# Patient Record
Sex: FEMALE | Race: WHITE | Employment: OTHER | URBAN - METROPOLITAN AREA
[De-identification: names, ages, dates, MRNs, and addresses within clinical notes are randomized per-mention and may not be internally consistent; named-entity substitution may affect disease eponyms.]

---

## 2017-06-09 ENCOUNTER — OFFICE VISIT (OUTPATIENT)
Dept: ORTHOPEDIC SURGERY | Age: 37
End: 2017-06-09

## 2017-06-09 VITALS
WEIGHT: 149.91 LBS | HEIGHT: 59 IN | SYSTOLIC BLOOD PRESSURE: 124 MMHG | RESPIRATION RATE: 17 BRPM | HEART RATE: 93 BPM | BODY MASS INDEX: 30.22 KG/M2 | DIASTOLIC BLOOD PRESSURE: 78 MMHG

## 2017-06-09 DIAGNOSIS — M72.2 PLANTAR FASCIITIS OF LEFT FOOT: Primary | ICD-10-CM

## 2017-06-09 DIAGNOSIS — M79.672 PAIN OF LEFT HEEL: ICD-10-CM

## 2017-06-09 PROCEDURE — 99213 OFFICE O/P EST LOW 20 MIN: CPT | Performed by: ORTHOPAEDIC SURGERY

## 2017-06-09 RX ORDER — NAPROXEN 500 MG/1
500 TABLET ORAL 2 TIMES DAILY WITH MEALS
Qty: 60 TABLET | Refills: 1 | Status: CANCELLED | OUTPATIENT
Start: 2017-06-09

## 2017-06-09 RX ORDER — METHYLPREDNISOLONE 4 MG/1
TABLET ORAL
Qty: 1 KIT | Refills: 0 | Status: SHIPPED | OUTPATIENT
Start: 2017-06-09 | End: 2017-06-15

## 2017-06-09 RX ORDER — NAPROXEN 375 MG/1
375 TABLET ORAL 2 TIMES DAILY WITH MEALS
COMMUNITY
End: 2017-08-13 | Stop reason: ALTCHOICE

## 2018-03-09 ENCOUNTER — TELEPHONE (OUTPATIENT)
Dept: GYNECOLOGY | Age: 38
End: 2018-03-09

## 2018-04-23 ENCOUNTER — OFFICE VISIT (OUTPATIENT)
Dept: FAMILY MEDICINE CLINIC | Age: 38
End: 2018-04-23

## 2018-04-23 VITALS
SYSTOLIC BLOOD PRESSURE: 116 MMHG | OXYGEN SATURATION: 96 % | BODY MASS INDEX: 33.87 KG/M2 | HEIGHT: 59 IN | HEART RATE: 90 BPM | DIASTOLIC BLOOD PRESSURE: 76 MMHG | WEIGHT: 168 LBS

## 2018-04-23 DIAGNOSIS — R51.9 CHRONIC NONINTRACTABLE HEADACHE, UNSPECIFIED HEADACHE TYPE: Primary | ICD-10-CM

## 2018-04-23 DIAGNOSIS — G89.29 CHRONIC NONINTRACTABLE HEADACHE, UNSPECIFIED HEADACHE TYPE: Primary | ICD-10-CM

## 2018-04-23 DIAGNOSIS — M79.89 LEG SWELLING: ICD-10-CM

## 2018-04-23 DIAGNOSIS — E66.09 CLASS 1 OBESITY DUE TO EXCESS CALORIES WITHOUT SERIOUS COMORBIDITY WITH BODY MASS INDEX (BMI) OF 33.0 TO 33.9 IN ADULT: ICD-10-CM

## 2018-04-23 DIAGNOSIS — B35.1 ONYCHOMYCOSIS: ICD-10-CM

## 2018-04-23 DIAGNOSIS — R06.09 DYSPNEA ON EXERTION: ICD-10-CM

## 2018-04-23 DIAGNOSIS — Z72.0 TOBACCO ABUSE: ICD-10-CM

## 2018-04-23 DIAGNOSIS — F41.9 ANXIETY: ICD-10-CM

## 2018-04-23 PROBLEM — M72.2 PLANTAR FASCIITIS OF LEFT FOOT: Status: RESOLVED | Noted: 2017-06-09 | Resolved: 2018-04-23

## 2018-04-23 LAB
CHOLESTEROL, TOTAL: 263 MG/DL (ref 0–199)
HDLC SERPL-MCNC: 31 MG/DL (ref 40–60)
LDL CHOLESTEROL CALCULATED: 178 MG/DL
TRIGL SERPL-MCNC: 271 MG/DL (ref 0–150)
TSH REFLEX: 1.22 UIU/ML (ref 0.27–4.2)
VLDLC SERPL CALC-MCNC: 54 MG/DL

## 2018-04-23 PROCEDURE — 99202 OFFICE O/P NEW SF 15 MIN: CPT | Performed by: NURSE PRACTITIONER

## 2018-04-23 PROCEDURE — 4004F PT TOBACCO SCREEN RCVD TLK: CPT | Performed by: NURSE PRACTITIONER

## 2018-04-23 PROCEDURE — G8417 CALC BMI ABV UP PARAM F/U: HCPCS | Performed by: NURSE PRACTITIONER

## 2018-04-23 PROCEDURE — G8427 DOCREV CUR MEDS BY ELIG CLIN: HCPCS | Performed by: NURSE PRACTITIONER

## 2018-04-23 RX ORDER — OXYCODONE HYDROCHLORIDE AND ACETAMINOPHEN 5; 325 MG/1; MG/1
1 TABLET ORAL EVERY 4 HOURS PRN
COMMUNITY
End: 2018-05-14

## 2018-04-23 RX ORDER — HYDROCHLOROTHIAZIDE 12.5 MG/1
12.5 TABLET ORAL DAILY
Qty: 30 TABLET | Refills: 0 | Status: SHIPPED | OUTPATIENT
Start: 2018-04-23 | End: 2018-05-17 | Stop reason: SDUPTHER

## 2018-04-23 RX ORDER — TERBINAFINE HYDROCHLORIDE 250 MG/1
250 TABLET ORAL DAILY
Qty: 90 TABLET | Refills: 0 | Status: SHIPPED | OUTPATIENT
Start: 2018-04-23 | End: 2019-03-21 | Stop reason: ALTCHOICE

## 2018-04-23 ASSESSMENT — ENCOUNTER SYMPTOMS
APNEA: 0
STRIDOR: 0
CHOKING: 0
CHEST TIGHTNESS: 0
WHEEZING: 0
COUGH: 0
SHORTNESS OF BREATH: 1

## 2018-04-23 ASSESSMENT — PATIENT HEALTH QUESTIONNAIRE - PHQ9
2. FEELING DOWN, DEPRESSED OR HOPELESS: 0
SUM OF ALL RESPONSES TO PHQ9 QUESTIONS 1 & 2: 0
SUM OF ALL RESPONSES TO PHQ QUESTIONS 1-9: 0

## 2018-04-24 LAB
ESTIMATED AVERAGE GLUCOSE: 131.2 MG/DL
HBA1C MFR BLD: 6.2 %

## 2018-04-30 ENCOUNTER — TELEPHONE (OUTPATIENT)
Dept: FAMILY MEDICINE CLINIC | Age: 38
End: 2018-04-30

## 2018-05-14 ENCOUNTER — TELEPHONE (OUTPATIENT)
Dept: FAMILY MEDICINE CLINIC | Age: 38
End: 2018-05-14

## 2018-05-17 DIAGNOSIS — M79.89 LEG SWELLING: ICD-10-CM

## 2018-05-17 RX ORDER — HYDROCHLOROTHIAZIDE 12.5 MG/1
12.5 TABLET ORAL DAILY
Qty: 30 TABLET | Refills: 0 | Status: SHIPPED | OUTPATIENT
Start: 2018-05-17 | End: 2018-06-12 | Stop reason: SDUPTHER

## 2018-05-21 ENCOUNTER — OFFICE VISIT (OUTPATIENT)
Dept: FAMILY MEDICINE CLINIC | Age: 38
End: 2018-05-21

## 2018-05-21 VITALS
WEIGHT: 169 LBS | SYSTOLIC BLOOD PRESSURE: 114 MMHG | HEIGHT: 59 IN | HEART RATE: 83 BPM | BODY MASS INDEX: 34.07 KG/M2 | OXYGEN SATURATION: 98 % | DIASTOLIC BLOOD PRESSURE: 86 MMHG

## 2018-05-21 DIAGNOSIS — R06.09 EXERTIONAL DYSPNEA: ICD-10-CM

## 2018-05-21 DIAGNOSIS — M72.2 PLANTAR FASCIITIS, BILATERAL: ICD-10-CM

## 2018-05-21 DIAGNOSIS — R51.9 CHRONIC HEAD PAIN: Primary | ICD-10-CM

## 2018-05-21 DIAGNOSIS — G89.29 CHRONIC HEAD PAIN: Primary | ICD-10-CM

## 2018-05-21 DIAGNOSIS — Z72.0 TOBACCO ABUSE: ICD-10-CM

## 2018-05-21 PROCEDURE — 4004F PT TOBACCO SCREEN RCVD TLK: CPT | Performed by: NURSE PRACTITIONER

## 2018-05-21 PROCEDURE — G8417 CALC BMI ABV UP PARAM F/U: HCPCS | Performed by: NURSE PRACTITIONER

## 2018-05-21 PROCEDURE — 99213 OFFICE O/P EST LOW 20 MIN: CPT | Performed by: NURSE PRACTITIONER

## 2018-05-21 PROCEDURE — G8427 DOCREV CUR MEDS BY ELIG CLIN: HCPCS | Performed by: NURSE PRACTITIONER

## 2018-05-21 ASSESSMENT — ENCOUNTER SYMPTOMS
BACK PAIN: 0
APNEA: 0
CHOKING: 0
COUGH: 0
SHORTNESS OF BREATH: 1
WHEEZING: 0
STRIDOR: 0
CHEST TIGHTNESS: 0

## 2018-05-22 ENCOUNTER — TELEPHONE (OUTPATIENT)
Dept: PAIN MANAGEMENT | Age: 38
End: 2018-05-22

## 2018-06-06 ENCOUNTER — OFFICE VISIT (OUTPATIENT)
Dept: FAMILY MEDICINE CLINIC | Age: 38
End: 2018-06-06

## 2018-06-06 VITALS
BODY MASS INDEX: 33.55 KG/M2 | HEIGHT: 59 IN | DIASTOLIC BLOOD PRESSURE: 84 MMHG | WEIGHT: 166.4 LBS | OXYGEN SATURATION: 98 % | SYSTOLIC BLOOD PRESSURE: 102 MMHG | HEART RATE: 78 BPM

## 2018-06-06 DIAGNOSIS — R19.7 DIARRHEA, UNSPECIFIED TYPE: ICD-10-CM

## 2018-06-06 DIAGNOSIS — R10.30 LOWER ABDOMINAL PAIN: ICD-10-CM

## 2018-06-06 DIAGNOSIS — K62.5 RECTAL BLEEDING: Primary | ICD-10-CM

## 2018-06-06 PROCEDURE — G8417 CALC BMI ABV UP PARAM F/U: HCPCS | Performed by: NURSE PRACTITIONER

## 2018-06-06 PROCEDURE — 4004F PT TOBACCO SCREEN RCVD TLK: CPT | Performed by: NURSE PRACTITIONER

## 2018-06-06 PROCEDURE — G8427 DOCREV CUR MEDS BY ELIG CLIN: HCPCS | Performed by: NURSE PRACTITIONER

## 2018-06-06 PROCEDURE — 99213 OFFICE O/P EST LOW 20 MIN: CPT | Performed by: NURSE PRACTITIONER

## 2018-06-06 ASSESSMENT — ENCOUNTER SYMPTOMS
CONSTIPATION: 0
BLOOD IN STOOL: 1
VOMITING: 0
SHORTNESS OF BREATH: 0
ABDOMINAL DISTENTION: 1
RECTAL PAIN: 0
NAUSEA: 1
ANAL BLEEDING: 1
ABDOMINAL PAIN: 1
DIARRHEA: 1

## 2018-06-06 ASSESSMENT — PATIENT HEALTH QUESTIONNAIRE - PHQ9
1. LITTLE INTEREST OR PLEASURE IN DOING THINGS: 0
SUM OF ALL RESPONSES TO PHQ9 QUESTIONS 1 & 2: 0
2. FEELING DOWN, DEPRESSED OR HOPELESS: 0
SUM OF ALL RESPONSES TO PHQ QUESTIONS 1-9: 0

## 2018-06-07 ENCOUNTER — PATIENT MESSAGE (OUTPATIENT)
Dept: FAMILY MEDICINE CLINIC | Age: 38
End: 2018-06-07

## 2018-06-12 DIAGNOSIS — M79.89 LEG SWELLING: ICD-10-CM

## 2018-06-12 RX ORDER — HYDROCHLOROTHIAZIDE 12.5 MG/1
12.5 TABLET ORAL DAILY
Qty: 30 TABLET | Refills: 5 | Status: SHIPPED | OUTPATIENT
Start: 2018-06-12 | End: 2019-03-21 | Stop reason: ALTCHOICE

## 2018-06-13 ENCOUNTER — INITIAL CONSULT (OUTPATIENT)
Dept: GASTROENTEROLOGY | Age: 38
End: 2018-06-13

## 2018-06-13 VITALS
SYSTOLIC BLOOD PRESSURE: 122 MMHG | DIASTOLIC BLOOD PRESSURE: 84 MMHG | WEIGHT: 165.6 LBS | HEIGHT: 59 IN | BODY MASS INDEX: 33.38 KG/M2

## 2018-06-13 DIAGNOSIS — R14.0 ABDOMINAL BLOATING: ICD-10-CM

## 2018-06-13 DIAGNOSIS — K62.5 RECTAL BLEEDING: ICD-10-CM

## 2018-06-13 DIAGNOSIS — R10.84 GENERALIZED ABDOMINAL PAIN: ICD-10-CM

## 2018-06-13 DIAGNOSIS — R19.7 DIARRHEA, UNSPECIFIED TYPE: Primary | ICD-10-CM

## 2018-06-13 PROCEDURE — 99204 OFFICE O/P NEW MOD 45 MIN: CPT | Performed by: INTERNAL MEDICINE

## 2018-06-13 RX ORDER — POLYETHYLENE GLYCOL 3350 17 G/17G
255 POWDER ORAL ONCE
Qty: 255 G | Refills: 0 | Status: SHIPPED | OUTPATIENT
Start: 2018-06-13 | End: 2018-06-13

## 2018-06-20 ENCOUNTER — OFFICE VISIT (OUTPATIENT)
Dept: FAMILY MEDICINE CLINIC | Age: 38
End: 2018-06-20

## 2018-06-20 VITALS
TEMPERATURE: 98.5 F | DIASTOLIC BLOOD PRESSURE: 82 MMHG | SYSTOLIC BLOOD PRESSURE: 122 MMHG | RESPIRATION RATE: 14 BRPM | HEIGHT: 59 IN | BODY MASS INDEX: 33.47 KG/M2 | WEIGHT: 166 LBS | HEART RATE: 75 BPM | OXYGEN SATURATION: 98 %

## 2018-06-20 DIAGNOSIS — J03.90 TONSILLITIS: Primary | ICD-10-CM

## 2018-06-20 PROCEDURE — 4004F PT TOBACCO SCREEN RCVD TLK: CPT | Performed by: NURSE PRACTITIONER

## 2018-06-20 PROCEDURE — 99213 OFFICE O/P EST LOW 20 MIN: CPT | Performed by: NURSE PRACTITIONER

## 2018-06-20 PROCEDURE — G8427 DOCREV CUR MEDS BY ELIG CLIN: HCPCS | Performed by: NURSE PRACTITIONER

## 2018-06-20 PROCEDURE — G8417 CALC BMI ABV UP PARAM F/U: HCPCS | Performed by: NURSE PRACTITIONER

## 2018-06-20 RX ORDER — AZITHROMYCIN 250 MG/1
250 TABLET, FILM COATED ORAL DAILY
Qty: 6 TABLET | Refills: 0 | Status: ON HOLD | OUTPATIENT
Start: 2018-06-20 | End: 2018-06-30 | Stop reason: ALTCHOICE

## 2018-06-20 RX ORDER — METHYLPREDNISOLONE 4 MG/1
TABLET ORAL
Qty: 1 KIT | Refills: 0 | Status: ON HOLD | OUTPATIENT
Start: 2018-06-20 | End: 2018-06-30 | Stop reason: ALTCHOICE

## 2018-06-20 RX ORDER — DEXTROMETHORPHAN HYDROBROMIDE AND PROMETHAZINE HYDROCHLORIDE 15; 6.25 MG/5ML; MG/5ML
5 SYRUP ORAL 4 TIMES DAILY PRN
Qty: 118 ML | Refills: 0 | Status: ON HOLD | OUTPATIENT
Start: 2018-06-20 | End: 2018-06-30

## 2018-06-20 ASSESSMENT — ENCOUNTER SYMPTOMS
SORE THROAT: 1
SHORTNESS OF BREATH: 0
SINUS PRESSURE: 0
SINUS PAIN: 0
RHINORRHEA: 0
COUGH: 1

## 2018-06-29 ENCOUNTER — TELEPHONE (OUTPATIENT)
Dept: GASTROENTEROLOGY | Age: 38
End: 2018-06-29

## 2018-06-30 PROBLEM — E87.6 HYPOKALEMIA: Status: ACTIVE | Noted: 2018-06-30

## 2018-06-30 PROBLEM — K62.5 RECTAL BLEEDING: Status: RESOLVED | Noted: 2018-06-13 | Resolved: 2018-06-30

## 2018-06-30 PROBLEM — R10.84 GENERALIZED ABDOMINAL PAIN: Status: RESOLVED | Noted: 2018-06-13 | Resolved: 2018-06-30

## 2018-06-30 PROBLEM — A41.9 SEPSIS (HCC): Status: ACTIVE | Noted: 2018-06-30

## 2018-06-30 PROBLEM — Z72.0 TOBACCO ABUSE: Chronic | Status: ACTIVE | Noted: 2018-04-23

## 2018-06-30 PROBLEM — R51.9 CHRONIC NONINTRACTABLE HEADACHE: Chronic | Status: ACTIVE | Noted: 2018-04-23

## 2018-06-30 PROBLEM — R19.7 DIARRHEA: Status: RESOLVED | Noted: 2018-06-13 | Resolved: 2018-06-30

## 2018-06-30 PROBLEM — A87.9 VIRAL MENINGITIS: Status: ACTIVE | Noted: 2018-06-30

## 2018-06-30 PROBLEM — R51.9 CHRONIC HEAD PAIN: Status: RESOLVED | Noted: 2018-05-21 | Resolved: 2018-06-30

## 2018-06-30 PROBLEM — R06.09 EXERTIONAL DYSPNEA: Status: RESOLVED | Noted: 2018-05-21 | Resolved: 2018-06-30

## 2018-06-30 PROBLEM — G89.29 CHRONIC NONINTRACTABLE HEADACHE: Chronic | Status: ACTIVE | Noted: 2018-04-23

## 2018-06-30 PROBLEM — M79.89 LEG SWELLING: Status: RESOLVED | Noted: 2018-04-23 | Resolved: 2018-06-30

## 2018-06-30 PROBLEM — R14.0 ABDOMINAL BLOATING: Status: RESOLVED | Noted: 2018-06-13 | Resolved: 2018-06-30

## 2018-06-30 PROBLEM — F41.9 ANXIETY: Status: RESOLVED | Noted: 2018-04-23 | Resolved: 2018-06-30

## 2018-06-30 PROBLEM — G89.29 CHRONIC HEAD PAIN: Status: RESOLVED | Noted: 2018-05-21 | Resolved: 2018-06-30

## 2018-06-30 PROBLEM — R82.81 PYURIA: Status: ACTIVE | Noted: 2018-06-30

## 2018-06-30 PROBLEM — B35.1 ONYCHOMYCOSIS: Status: RESOLVED | Noted: 2018-04-23 | Resolved: 2018-06-30

## 2018-07-02 ENCOUNTER — HOSPITAL ENCOUNTER (OUTPATIENT)
Dept: OTHER | Age: 38
Discharge: HOME | End: 2018-07-02

## 2018-07-02 ENCOUNTER — TELEPHONE (OUTPATIENT)
Dept: GASTROENTEROLOGY | Age: 38
End: 2018-07-02

## 2018-07-02 NOTE — TELEPHONE ENCOUNTER
Pt left VM on office phone, stating she was currently admitted with Menigitis, Colonoscopy cancelled

## 2018-07-19 ENCOUNTER — HOSPITAL ENCOUNTER (EMERGENCY)
Age: 38
Discharge: HOME | End: 2018-07-20
Attending: EMERGENCY MEDICINE
Payer: COMMERCIAL

## 2018-07-19 DIAGNOSIS — L25.9 CONTACT DERMATITIS, UNSPECIFIED CONTACT DERMATITIS TYPE, UNSPECIFIED TRIGGER: ICD-10-CM

## 2018-07-19 DIAGNOSIS — R51.9 NONINTRACTABLE HEADACHE, UNSPECIFIED CHRONICITY PATTERN, UNSPECIFIED HEADACHE TYPE: Primary | ICD-10-CM

## 2018-07-19 PROCEDURE — 99284 EMERGENCY DEPT VISIT MOD MDM: CPT

## 2018-07-19 ASSESSMENT — PAIN DESCRIPTION - PAIN TYPE: TYPE: ACUTE PAIN

## 2018-07-19 ASSESSMENT — PAIN SCALES - GENERAL: PAINLEVEL_OUTOF10: 9

## 2018-07-19 ASSESSMENT — PAIN DESCRIPTION - LOCATION: LOCATION: HEAD

## 2018-07-19 ASSESSMENT — PAIN DESCRIPTION - DESCRIPTORS: DESCRIPTORS: ACHING

## 2018-07-20 VITALS
HEART RATE: 81 BPM | RESPIRATION RATE: 16 BRPM | WEIGHT: 160 LBS | TEMPERATURE: 98.6 F | DIASTOLIC BLOOD PRESSURE: 80 MMHG | BODY MASS INDEX: 32.32 KG/M2 | SYSTOLIC BLOOD PRESSURE: 110 MMHG | OXYGEN SATURATION: 96 %

## 2018-07-20 LAB
A/G RATIO: 1.2 (ref 1.1–2.2)
ALBUMIN SERPL-MCNC: 3.6 G/DL (ref 3.4–5)
ALP BLD-CCNC: 102 U/L (ref 40–129)
ALT SERPL-CCNC: 19 U/L (ref 10–40)
ANION GAP SERPL CALCULATED.3IONS-SCNC: 14 MMOL/L (ref 3–16)
AST SERPL-CCNC: 17 U/L (ref 15–37)
BASOPHILS ABSOLUTE: 0.2 K/UL (ref 0–0.2)
BASOPHILS RELATIVE PERCENT: 1.4 %
BILIRUB SERPL-MCNC: <0.2 MG/DL (ref 0–1)
BUN BLDV-MCNC: 16 MG/DL (ref 7–20)
CALCIUM SERPL-MCNC: 9.1 MG/DL (ref 8.3–10.6)
CHLORIDE BLD-SCNC: 100 MMOL/L (ref 99–110)
CO2: 25 MMOL/L (ref 21–32)
CREAT SERPL-MCNC: 0.7 MG/DL (ref 0.6–1.1)
EKG ATRIAL RATE: 79 BPM
EKG DIAGNOSIS: NORMAL
EKG P AXIS: 46 DEGREES
EKG P-R INTERVAL: 144 MS
EKG Q-T INTERVAL: 418 MS
EKG QRS DURATION: 88 MS
EKG QTC CALCULATION (BAZETT): 479 MS
EKG R AXIS: 3 DEGREES
EKG T AXIS: 22 DEGREES
EKG VENTRICULAR RATE: 79 BPM
EOSINOPHILS ABSOLUTE: 0.3 K/UL (ref 0–0.6)
EOSINOPHILS RELATIVE PERCENT: 2.9 %
GFR AFRICAN AMERICAN: >60
GFR NON-AFRICAN AMERICAN: >60
GLOBULIN: 2.9 G/DL
GLUCOSE BLD-MCNC: 112 MG/DL (ref 70–99)
HCT VFR BLD CALC: 41.3 % (ref 36–48)
HEMOGLOBIN: 14.5 G/DL (ref 12–16)
LYMPHOCYTES ABSOLUTE: 3.8 K/UL (ref 1–5.1)
LYMPHOCYTES RELATIVE PERCENT: 35.5 %
MCH RBC QN AUTO: 29.3 PG (ref 26–34)
MCHC RBC AUTO-ENTMCNC: 35 G/DL (ref 31–36)
MCV RBC AUTO: 83.7 FL (ref 80–100)
MONOCYTES ABSOLUTE: 0.8 K/UL (ref 0–1.3)
MONOCYTES RELATIVE PERCENT: 7.2 %
NEUTROPHILS ABSOLUTE: 5.6 K/UL (ref 1.7–7.7)
NEUTROPHILS RELATIVE PERCENT: 53 %
PDW BLD-RTO: 14.3 % (ref 12.4–15.4)
PLATELET # BLD: 339 K/UL (ref 135–450)
PMV BLD AUTO: 8.8 FL (ref 5–10.5)
POTASSIUM SERPL-SCNC: 3.9 MMOL/L (ref 3.5–5.1)
RBC # BLD: 4.93 M/UL (ref 4–5.2)
SODIUM BLD-SCNC: 139 MMOL/L (ref 136–145)
TOTAL PROTEIN: 6.5 G/DL (ref 6.4–8.2)
TROPONIN: <0.01 NG/ML
WBC # BLD: 10.6 K/UL (ref 4–11)

## 2018-07-20 PROCEDURE — 2580000003 HC RX 258: Performed by: EMERGENCY MEDICINE

## 2018-07-20 PROCEDURE — 96374 THER/PROPH/DIAG INJ IV PUSH: CPT

## 2018-07-20 PROCEDURE — 85025 COMPLETE CBC W/AUTO DIFF WBC: CPT

## 2018-07-20 PROCEDURE — 6370000000 HC RX 637 (ALT 250 FOR IP): Performed by: EMERGENCY MEDICINE

## 2018-07-20 PROCEDURE — 93005 ELECTROCARDIOGRAM TRACING: CPT | Performed by: EMERGENCY MEDICINE

## 2018-07-20 PROCEDURE — 80053 COMPREHEN METABOLIC PANEL: CPT

## 2018-07-20 PROCEDURE — 96375 TX/PRO/DX INJ NEW DRUG ADDON: CPT

## 2018-07-20 PROCEDURE — 96361 HYDRATE IV INFUSION ADD-ON: CPT

## 2018-07-20 PROCEDURE — 93010 ELECTROCARDIOGRAM REPORT: CPT | Performed by: INTERNAL MEDICINE

## 2018-07-20 PROCEDURE — 84484 ASSAY OF TROPONIN QUANT: CPT

## 2018-07-20 PROCEDURE — 6360000002 HC RX W HCPCS: Performed by: EMERGENCY MEDICINE

## 2018-07-20 RX ORDER — 0.9 % SODIUM CHLORIDE 0.9 %
1000 INTRAVENOUS SOLUTION INTRAVENOUS ONCE
Status: COMPLETED | OUTPATIENT
Start: 2018-07-20 | End: 2018-07-20

## 2018-07-20 RX ORDER — KETOROLAC TROMETHAMINE 30 MG/ML
30 INJECTION, SOLUTION INTRAMUSCULAR; INTRAVENOUS ONCE
Status: DISCONTINUED | OUTPATIENT
Start: 2018-07-20 | End: 2018-07-20 | Stop reason: HOSPADM

## 2018-07-20 RX ORDER — LIDOCAINE 50 MG/G
1 PATCH TOPICAL ONCE
Status: DISCONTINUED | OUTPATIENT
Start: 2018-07-20 | End: 2018-07-20 | Stop reason: HOSPADM

## 2018-07-20 RX ORDER — TRIAMCINOLONE ACETONIDE 5 MG/G
CREAM TOPICAL
Qty: 15 G | Refills: 0 | Status: SHIPPED | OUTPATIENT
Start: 2018-07-20 | End: 2018-07-27

## 2018-07-20 RX ORDER — METOCLOPRAMIDE HYDROCHLORIDE 5 MG/ML
10 INJECTION INTRAMUSCULAR; INTRAVENOUS ONCE
Status: DISCONTINUED | OUTPATIENT
Start: 2018-07-20 | End: 2018-07-20 | Stop reason: HOSPADM

## 2018-07-20 RX ORDER — DIAZEPAM 5 MG/1
5 TABLET ORAL ONCE
Status: COMPLETED | OUTPATIENT
Start: 2018-07-20 | End: 2018-07-20

## 2018-07-20 RX ORDER — ONDANSETRON 4 MG/1
4 TABLET, ORALLY DISINTEGRATING ORAL ONCE
Status: COMPLETED | OUTPATIENT
Start: 2018-07-20 | End: 2018-07-20

## 2018-07-20 RX ORDER — DIPHENHYDRAMINE HYDROCHLORIDE 50 MG/ML
12.5 INJECTION INTRAMUSCULAR; INTRAVENOUS ONCE
Status: COMPLETED | OUTPATIENT
Start: 2018-07-20 | End: 2018-07-20

## 2018-07-20 RX ORDER — ACETAMINOPHEN 500 MG
1000 TABLET ORAL ONCE
Status: COMPLETED | OUTPATIENT
Start: 2018-07-20 | End: 2018-07-20

## 2018-07-20 RX ADMIN — Medication 1 MG: at 06:14

## 2018-07-20 RX ADMIN — ONDANSETRON 4 MG: 4 TABLET, ORALLY DISINTEGRATING ORAL at 01:54

## 2018-07-20 RX ADMIN — DIAZEPAM 5 MG: 5 TABLET ORAL at 01:54

## 2018-07-20 RX ADMIN — DIPHENHYDRAMINE HYDROCHLORIDE 12.5 MG: 50 INJECTION, SOLUTION INTRAMUSCULAR; INTRAVENOUS at 04:02

## 2018-07-20 RX ADMIN — ACETAMINOPHEN 1000 MG: 500 TABLET, FILM COATED ORAL at 01:53

## 2018-07-20 RX ADMIN — SODIUM CHLORIDE 1000 ML: 9 INJECTION, SOLUTION INTRAVENOUS at 04:03

## 2018-07-20 ASSESSMENT — PAIN SCALES - GENERAL
PAINLEVEL_OUTOF10: 9
PAINLEVEL_OUTOF10: 9
PAINLEVEL_OUTOF10: 5
PAINLEVEL_OUTOF10: 9

## 2018-07-20 ASSESSMENT — PAIN DESCRIPTION - LOCATION
LOCATION: HEAD
LOCATION: HEAD

## 2018-07-20 ASSESSMENT — PAIN DESCRIPTION - PAIN TYPE
TYPE: CHRONIC PAIN
TYPE: ACUTE PAIN;CHRONIC PAIN

## 2018-07-20 NOTE — ED PROVIDER NOTES
[Butalbital-Apap-Caff-Cod] Anxiety    Prochlorperazine Anxiety    Vicodin [Hydrocodone-Acetaminophen] Nausea And Vomiting       Past medical history:  has a past medical history of ADHD (attention deficit hyperactivity disorder) (); Anesthesia complication; Anxiety; Difficult intubation; Functional ovarian cysts (); Headache(784.0); Hiatal hernia; History of blood transfusion; History of kidney stones; History of PCOS; Hydrocephalus; Irritable bowel syndrome (); Meningitis; PONV (postoperative nausea and vomiting); Primary osteoarthritis of left knee (2016); S/P cone biopsy of cervix (); Scoliosis (.s); Tobacco abuse (2018);  (ventriculoperitoneal) shunt status (); and Wears glasses. Past surgical history:  has a past surgical history that includes Appendectomy (); hernia repair (); Colonoscopy (); Colposcopy ();  section (); csf shunt; Cystoscopy; Knee arthroscopy (Left, 2015); Cholecystectomy; other surgical history (10/19/2016); Ventriculoperitoneal shunt; and Hysterectomy, total abdominal (2016). Home medications:   Prior to Admission medications    Medication Sig Start Date End Date Taking? Authorizing Provider   ibuprofen (ADVIL;MOTRIN) 400 MG tablet Take 1 tablet by mouth every 6 hours as needed for Pain 18   FREDRICK Wang CNP   hydrochlorothiazide (HYDRODIURIL) 12.5 MG tablet TAKE 1 TABLET BY MOUTH DAILY  Patient taking differently: Take 12.5 mg by mouth daily as needed  18   FREDRICK Mancilla CNP   ondansetron [facility]) 4 MG tablet Take 1 tablet by mouth every 8 hours as needed for Nausea or Vomiting 18   Noble Cordero MD   terbinafine (LAMISIL) 250 MG tablet Take 1 tablet by mouth daily Daily for 12 weeks 18   FREDRICK Mancilla CNP       Social history:  reports that she has been smoking Cigarettes. She has a 9.00 pack-year smoking history.  She has never used smokeless tobacco. She reports that she does not drink alcohol or use drugs. Family history:    Family History   Problem Relation Age of Onset    High Blood Pressure Mother     Diabetes Mother     High Cholesterol Mother     Depression Mother     Diabetes Maternal Grandmother     High Blood Pressure Maternal Grandmother     High Cholesterol Maternal Grandmother     Heart Disease Maternal Grandfather     High Blood Pressure Maternal Grandfather     Heart Disease Paternal Grandmother     Stroke Paternal Grandfather     Depression Sister     Rheum Arthritis Neg Hx     Osteoarthritis Neg Hx     Asthma Neg Hx     Breast Cancer Neg Hx     Cancer Neg Hx     Heart Failure Neg Hx     Hypertension Neg Hx     Migraines Neg Hx     Ovarian Cancer Neg Hx     Rashes/Skin Problems Neg Hx     Seizures Neg Hx     Thyroid Disease Neg Hx        Exam  ED Triage Vitals [07/19/18 2207]   BP Temp Temp Source Pulse Resp SpO2 Height Weight   121/86 99 °F (37.2 °C) Oral 93 16 98 % -- 160 lb (72.6 kg)   Physical Exam   Constitutional: She is oriented to person, place, and time. She appears well-developed and well-nourished. No distress. HENT:   Head: Normocephalic and atraumatic. Shunt palpable on the left parietal scalp. No obvious signs of infection, swelling, erythema, etc. But very tender to touch locally. Eyes: Conjunctivae, EOM and lids are normal. Pupils are equal, round, and reactive to light. Right eye exhibits no discharge. Left eye exhibits no discharge. No scleral icterus. Neck: Normal range of motion. Neck supple. No tracheal deviation present. Cardiovascular: Normal rate, regular rhythm, normal heart sounds and intact distal pulses. No murmur heard. Pulmonary/Chest: Effort normal and breath sounds normal. No stridor. No respiratory distress. She has no wheezes. Abdominal: Soft. She exhibits no distension. There is no tenderness. There is no rebound and no guarding.    Musculoskeletal: She exhibits 35.0 31.0 - 36.0 g/dL    RDW 14.3 12.4 - 15.4 %    Platelets 667 454 - 992 K/uL    MPV 8.8 5.0 - 10.5 fL    Neutrophils % 53.0 %    Lymphocytes % 35.5 %    Monocytes % 7.2 %    Eosinophils % 2.9 %    Basophils % 1.4 %    Neutrophils # 5.6 1.7 - 7.7 K/uL    Lymphocytes # 3.8 1.0 - 5.1 K/uL    Monocytes # 0.8 0.0 - 1.3 K/uL    Eosinophils # 0.3 0.0 - 0.6 K/uL    Basophils # 0.2 0.0 - 0.2 K/uL   Comprehensive metabolic panel   Result Value Ref Range    Sodium 139 136 - 145 mmol/L    Potassium 3.9 3.5 - 5.1 mmol/L    Chloride 100 99 - 110 mmol/L    CO2 25 21 - 32 mmol/L    Anion Gap 14 3 - 16    Glucose 112 (H) 70 - 99 mg/dL    BUN 16 7 - 20 mg/dL    CREATININE 0.7 0.6 - 1.1 mg/dL    GFR Non-African American >60 >60    GFR African American >60 >60    Calcium 9.1 8.3 - 10.6 mg/dL    Total Protein 6.5 6.4 - 8.2 g/dL    Alb 3.6 3.4 - 5.0 g/dL    Albumin/Globulin Ratio 1.2 1.1 - 2.2    Total Bilirubin <0.2 0.0 - 1.0 mg/dL    Alkaline Phosphatase 102 40 - 129 U/L    ALT 19 10 - 40 U/L    AST 17 15 - 37 U/L    Globulin 2.9 g/dL   Troponin   Result Value Ref Range    Troponin <0.01 <0.01 ng/mL   EKG 12 Lead   Result Value Ref Range    Ventricular Rate 79 BPM    Atrial Rate 79 BPM    P-R Interval 144 ms    QRS Duration 88 ms    Q-T Interval 418 ms    QTc Calculation (Bazett) 479 ms    P Axis 46 degrees    R Axis 3 degrees    T Axis 22 degrees    Diagnosis       Normal sinus rhythmCannot rule out Anterior infarct , age undeterminedAbnormal ECGWhen compared with ECG of 11-SEP-2017 18:39,No significant change was foundConfirmed by Alex Meeks MD, [address] ([id number]) on 7/20/2018 7:28:50 AM       I estimate there is LOW risk for SUBARACHNOID HEMORRHAGE, MENINGITIS, INTRACRANIAL HEMORRHAGE, SUBDURAL HEMATOMA, OR STROKE, thus I consider the discharge disposition reasonable. Annel Villalobos and I have discussed the diagnosis and risks, and we agree with discharging home to follow-up with their primary doctor.  We also discussed returning to the

## 2018-08-13 ENCOUNTER — TELEPHONE (OUTPATIENT)
Dept: FAMILY MEDICINE CLINIC | Age: 38
End: 2018-08-13

## 2018-08-15 ENCOUNTER — APPOINTMENT (OUTPATIENT)
Dept: CT IMAGING | Age: 38
End: 2018-08-15
Payer: COMMERCIAL

## 2018-08-15 ENCOUNTER — APPOINTMENT (OUTPATIENT)
Dept: GENERAL RADIOLOGY | Age: 38
End: 2018-08-15
Payer: COMMERCIAL

## 2018-08-15 ENCOUNTER — HOSPITAL ENCOUNTER (EMERGENCY)
Age: 38
Discharge: LEFT BEFORE DISCHARGE | End: 2018-08-15
Attending: EMERGENCY MEDICINE
Payer: COMMERCIAL

## 2018-08-15 VITALS
OXYGEN SATURATION: 97 % | DIASTOLIC BLOOD PRESSURE: 87 MMHG | HEIGHT: 59 IN | TEMPERATURE: 98.4 F | SYSTOLIC BLOOD PRESSURE: 123 MMHG | WEIGHT: 150 LBS | HEART RATE: 84 BPM | BODY MASS INDEX: 30.24 KG/M2 | RESPIRATION RATE: 16 BRPM

## 2018-08-15 DIAGNOSIS — R51.9 LEFT-SIDED HEADACHE: Primary | ICD-10-CM

## 2018-08-15 LAB
A/G RATIO: 1.3 (ref 1.1–2.2)
ALBUMIN SERPL-MCNC: 4.1 G/DL (ref 3.4–5)
ALP BLD-CCNC: 84 U/L (ref 40–129)
ALT SERPL-CCNC: 11 U/L (ref 10–40)
ANION GAP SERPL CALCULATED.3IONS-SCNC: 12 MMOL/L (ref 3–16)
AST SERPL-CCNC: 10 U/L (ref 15–37)
BASOPHILS ABSOLUTE: 0.1 K/UL (ref 0–0.2)
BASOPHILS RELATIVE PERCENT: 0.8 %
BILIRUB SERPL-MCNC: 0.3 MG/DL (ref 0–1)
BILIRUBIN URINE: NEGATIVE
BLOOD, URINE: NEGATIVE
BUN BLDV-MCNC: 10 MG/DL (ref 7–20)
CALCIUM SERPL-MCNC: 9.5 MG/DL (ref 8.3–10.6)
CHLORIDE BLD-SCNC: 101 MMOL/L (ref 99–110)
CLARITY: CLEAR
CO2: 28 MMOL/L (ref 21–32)
COLOR: YELLOW
CREAT SERPL-MCNC: 0.7 MG/DL (ref 0.6–1.1)
EOSINOPHILS ABSOLUTE: 0.1 K/UL (ref 0–0.6)
EOSINOPHILS RELATIVE PERCENT: 1.2 %
GFR AFRICAN AMERICAN: >60
GFR NON-AFRICAN AMERICAN: >60
GLOBULIN: 3.1 G/DL
GLUCOSE BLD-MCNC: 97 MG/DL (ref 70–99)
GLUCOSE URINE: NEGATIVE MG/DL
HCT VFR BLD CALC: 43.1 % (ref 36–48)
HEMOGLOBIN: 14.8 G/DL (ref 12–16)
KETONES, URINE: NEGATIVE MG/DL
LACTIC ACID: 0.7 MMOL/L (ref 0.4–2)
LEUKOCYTE ESTERASE, URINE: NEGATIVE
LYMPHOCYTES ABSOLUTE: 3 K/UL (ref 1–5.1)
LYMPHOCYTES RELATIVE PERCENT: 27.4 %
MCH RBC QN AUTO: 29 PG (ref 26–34)
MCHC RBC AUTO-ENTMCNC: 34.3 G/DL (ref 31–36)
MCV RBC AUTO: 84.6 FL (ref 80–100)
MICROSCOPIC EXAMINATION: NORMAL
MONOCYTES ABSOLUTE: 0.5 K/UL (ref 0–1.3)
MONOCYTES RELATIVE PERCENT: 4.5 %
NEUTROPHILS ABSOLUTE: 7.2 K/UL (ref 1.7–7.7)
NEUTROPHILS RELATIVE PERCENT: 66.1 %
NITRITE, URINE: NEGATIVE
PDW BLD-RTO: 14.7 % (ref 12.4–15.4)
PH UA: 7
PLATELET # BLD: 301 K/UL (ref 135–450)
PMV BLD AUTO: 7.9 FL (ref 5–10.5)
POTASSIUM SERPL-SCNC: 3.7 MMOL/L (ref 3.5–5.1)
PROTEIN UA: NEGATIVE MG/DL
RBC # BLD: 5.09 M/UL (ref 4–5.2)
SODIUM BLD-SCNC: 141 MMOL/L (ref 136–145)
SPECIFIC GRAVITY UA: 1.01
TOTAL PROTEIN: 7.2 G/DL (ref 6.4–8.2)
URINE REFLEX TO CULTURE: NORMAL
URINE TYPE: NORMAL
UROBILINOGEN, URINE: 0.2 E.U./DL
WBC # BLD: 10.8 K/UL (ref 4–11)

## 2018-08-15 PROCEDURE — 72040 X-RAY EXAM NECK SPINE 2-3 VW: CPT

## 2018-08-15 PROCEDURE — 96360 HYDRATION IV INFUSION INIT: CPT

## 2018-08-15 PROCEDURE — 85025 COMPLETE CBC W/AUTO DIFF WBC: CPT

## 2018-08-15 PROCEDURE — 71045 X-RAY EXAM CHEST 1 VIEW: CPT

## 2018-08-15 PROCEDURE — 83605 ASSAY OF LACTIC ACID: CPT

## 2018-08-15 PROCEDURE — 99284 EMERGENCY DEPT VISIT MOD MDM: CPT

## 2018-08-15 PROCEDURE — 80053 COMPREHEN METABOLIC PANEL: CPT

## 2018-08-15 PROCEDURE — 74018 RADEX ABDOMEN 1 VIEW: CPT

## 2018-08-15 PROCEDURE — 70450 CT HEAD/BRAIN W/O DYE: CPT

## 2018-08-15 PROCEDURE — 70250 X-RAY EXAM OF SKULL: CPT

## 2018-08-15 PROCEDURE — 2580000003 HC RX 258: Performed by: EMERGENCY MEDICINE

## 2018-08-15 PROCEDURE — 81003 URINALYSIS AUTO W/O SCOPE: CPT

## 2018-08-15 PROCEDURE — 87040 BLOOD CULTURE FOR BACTERIA: CPT

## 2018-08-15 RX ORDER — 0.9 % SODIUM CHLORIDE 0.9 %
1000 INTRAVENOUS SOLUTION INTRAVENOUS ONCE
Status: COMPLETED | OUTPATIENT
Start: 2018-08-15 | End: 2018-08-15

## 2018-08-15 RX ORDER — PROMETHAZINE HYDROCHLORIDE 25 MG/ML
25 INJECTION, SOLUTION INTRAMUSCULAR; INTRAVENOUS ONCE
Status: DISCONTINUED | OUTPATIENT
Start: 2018-08-15 | End: 2018-08-15

## 2018-08-15 RX ADMIN — SODIUM CHLORIDE 1000 ML: 9 INJECTION, SOLUTION INTRAVENOUS at 18:08

## 2018-08-15 ASSESSMENT — ENCOUNTER SYMPTOMS
WHEEZING: 0
ABDOMINAL PAIN: 0
VOICE CHANGE: 0
TROUBLE SWALLOWING: 0
SHORTNESS OF BREATH: 0
VOMITING: 0
STRIDOR: 0
COLOR CHANGE: 0
FACIAL SWELLING: 1
NAUSEA: 0

## 2018-08-15 ASSESSMENT — PAIN SCALES - GENERAL: PAINLEVEL_OUTOF10: 8

## 2018-08-15 ASSESSMENT — PAIN DESCRIPTION - LOCATION: LOCATION: HEAD;FACE

## 2018-08-15 ASSESSMENT — PAIN DESCRIPTION - PAIN TYPE: TYPE: ACUTE PAIN

## 2018-08-15 NOTE — ED PROVIDER NOTES
Negative for color change and wound. Neurological: Positive for headaches. Negative for seizures and syncope. Psychiatric/Behavioral: Negative for self-injury and suicidal ideas. Except as noted above the remainder of the review of systems was reviewed and negative. PAST MEDICAL HISTORY     Past Medical History:   Diagnosis Date    ADHD (attention deficit hyperactivity disorder) 82    Anesthesia complication     pt states with general anesthesia, she gets very nauseated and wakes up with a migrain     Anxiety     Difficult intubation     Functional ovarian cysts 2008    rt ovary cyst x 2 yrs.     Headache(784.0)     migraines    Hiatal hernia     History of blood transfusion     History of kidney stones     History of PCOS     Hydrocephalus     Irritable bowel syndrome 2004    had colonoscopy about 6 yrs ago    Meningitis     PONV (postoperative nausea and vomiting)     only with general anesthesia    Primary osteoarthritis of left knee 2016    S/P cone biopsy of cervix 2004    Scoliosis .s    Tobacco abuse 2018     (ventriculoperitoneal) shunt status     Wears glasses     reading         SURGICAL HISTORY       Past Surgical History:   Procedure Laterality Date    APPENDECTOMY  2003    appendicitis     SECTION      placenta previa    CHOLECYSTECTOMY      COLONOSCOPY  2004    COLPOSCOPY      CSF SHUNT      replaced at age 17   [facility] CYSTOSCOPY      stone removal   [place]    inguinal    HYSTERECTOMY, TOTAL ABDOMINAL  2016    TAHBSO, adhesions/PCOS    KNEE ARTHROSCOPY Left 2015    medial meniscectomy, chondroplasty, plica resection    OTHER SURGICAL HISTORY  10/19/2016    op lap    VENTRICULOPERITONEAL SHUNT      multiple revisions         CURRENT MEDICATIONS       Discharge Medication List as of 8/15/2018  7:37 PM      CONTINUE these medications which have NOT CHANGED    Details   ibuprofen (ADVIL;MOTRIN) 400 MG tablet Take 1 tablet by mouth every 6 hours as needed for Pain, Disp-20 tablet, R-0Print      hydrochlorothiazide (HYDRODIURIL) 12.5 MG tablet TAKE 1 TABLET BY MOUTH DAILY, Disp-30 tablet, R-5Normal      terbinafine (LAMISIL) 250 MG tablet Take 1 tablet by mouth daily Daily for 12 weeks, Disp-90 tablet, R-0Normal             ALLERGIES     Bentyl [dicyclomine hcl]; Mushroom extract complex; Sulfa antibiotics; Bee venom; Morphine; Vancomycin; Ceftaroline; Daptomycin; Dicyclomine; Fioricet-codeine [butalbital-apap-caff-cod];  Prochlorperazine; and Vicodin [hydrocodone-acetaminophen]    FAMILY HISTORY       Family History   Problem Relation Age of Onset    High Blood Pressure Mother     Diabetes Mother     High Cholesterol Mother     Depression Mother     Diabetes Maternal Grandmother     High Blood Pressure Maternal Grandmother     High Cholesterol Maternal Grandmother     Heart Disease Maternal Grandfather     High Blood Pressure Maternal Grandfather     Heart Disease Paternal Grandmother     Stroke Paternal Grandfather     Depression Sister     Rheum Arthritis Neg Hx     Osteoarthritis Neg Hx     Asthma Neg Hx     Breast Cancer Neg Hx     Cancer Neg Hx     Heart Failure Neg Hx     Hypertension Neg Hx     Migraines Neg Hx     Ovarian Cancer Neg Hx     Rashes/Skin Problems Neg Hx     Seizures Neg Hx     Thyroid Disease Neg Hx           SOCIAL HISTORY       Social History     Social History    Marital status: Single     Spouse name: N/A    Number of children: N/A    Years of education: N/A     Social History Main Topics    Smoking status: Current Every Day Smoker     Packs/day: 0.50     Years: 18.00     Types: Cigarettes    Smokeless tobacco: Never Used    Alcohol use No    Drug use: No    Sexual activity: Yes     Partners: Male     Other Topics Concern    None     Social History Narrative    None         PHYSICAL EXAM    (up to 7 for level 4, 8 or more for level 5)     ED Triage Vitals diagnosed with aseptic meningitis. She does not report her symptoms today are similar to those as she does not have any fever and has full range of motion of her neck. Differential diagnosis considered includes shunt malfunction, shunt infection, meningitis, vertebral artery pathology, muscular strain, or context migraine. I have suggested plan to give the patient a migraine cocktail, contact her neurosurgeon, and then reevaluate her for improvement in symptoms after migraine cocktail. The patient declines this plan and states she wants to be immediately discharged in the emergency department. As assessing her response to treatment as well as talking to a specialist as part of my management plan I do not feel comfort with discharging the patient and have informed her that this would constitute leaving against medical advice as her evaluation has not been completed and with her and pathology has not been ruled out. She expresses understanding and agreement with this and is willing to sign against medical advice paperwork. I feel the patient's chronically sober and understands the risks, benefits, alternatives to further evaluation and therefore has capacity to make this decision. She is aware that she can return to the emergency department any time if she has worsening of symptoms or reconsidered. Close outpatient follow up is recommended. The patient as decided to leave against medical advice. The patient is awake and alert, non-intoxicated, non-suicidal, nonpsychotic. There is no medical condition that prevents or inhibits their understanding of the risks/benefits of their decision. The patient understands the risks and benefits of their decision and has been given the opportunity to ask questions about their medical condition. Procedures    FINAL IMPRESSION      1.  Left-sided headache          DISPOSITION/PLAN   DISPOSITION [place] 08/15/2018 07:37:36 PM      PATIENT REFERRED TO:  Frances Hopkins, Demetria Chucho, [address] [facility] [address]   [phone] (Fax)  In 1 day      [facility]  ED  [facility]  [address]  [phone]    If symptoms worsen        (Please note that portions of this note were completed with a voice recognition program.  Efforts were made to edit the dictations but occasionally words are mis-transcribed. )    Miguel Frank MD (electronically signed)  Attending Emergency Physician           Miguel Frank MD  08/15/18 7146

## 2018-08-15 NOTE — ED NOTES
Pt called out to nurse. States she wants her IV taken out and she has called for \"a ride to come and get her. I just want to go home and sleep. \"     Viola Salazar RN  08/15/18 1926

## 2018-08-15 NOTE — ED NOTES
Pt declines to have phenergan shot prior to leaving. States \"It's not going to make my head stop hurting. It will just make me tired. I can just take tylenol PM like I did last night. \"     Elma Lepe RN  08/15/18 1933

## 2018-08-15 NOTE — ED NOTES
This RN explained [place] paperwork to patient. Pt verb understanding and signed paper. Witnessed by this RN and charge nurse, Sara.      Gabe Maciel RN  08/15/18 4831

## 2018-08-19 ENCOUNTER — HOSPITAL ENCOUNTER (EMERGENCY)
Age: 38
Discharge: HOME | End: 2018-08-19
Attending: EMERGENCY MEDICINE
Payer: COMMERCIAL

## 2018-08-19 VITALS
HEIGHT: 59 IN | BODY MASS INDEX: 30.24 KG/M2 | OXYGEN SATURATION: 97 % | RESPIRATION RATE: 18 BRPM | TEMPERATURE: 98.6 F | HEART RATE: 100 BPM | DIASTOLIC BLOOD PRESSURE: 96 MMHG | WEIGHT: 150 LBS | SYSTOLIC BLOOD PRESSURE: 119 MMHG

## 2018-08-19 DIAGNOSIS — R51.9 NONINTRACTABLE HEADACHE, UNSPECIFIED CHRONICITY PATTERN, UNSPECIFIED HEADACHE TYPE: Primary | ICD-10-CM

## 2018-08-19 LAB
A/G RATIO: 1.2 (ref 1.1–2.2)
ALBUMIN SERPL-MCNC: 4.3 G/DL (ref 3.4–5)
ALP BLD-CCNC: 90 U/L (ref 40–129)
ALT SERPL-CCNC: 15 U/L (ref 10–40)
ANION GAP SERPL CALCULATED.3IONS-SCNC: 16 MMOL/L (ref 3–16)
AST SERPL-CCNC: 13 U/L (ref 15–37)
BASOPHILS ABSOLUTE: 0.1 K/UL (ref 0–0.2)
BASOPHILS RELATIVE PERCENT: 0.7 %
BILIRUB SERPL-MCNC: 0.4 MG/DL (ref 0–1)
BUN BLDV-MCNC: 12 MG/DL (ref 7–20)
CALCIUM SERPL-MCNC: 9.8 MG/DL (ref 8.3–10.6)
CHLORIDE BLD-SCNC: 99 MMOL/L (ref 99–110)
CO2: 23 MMOL/L (ref 21–32)
CREAT SERPL-MCNC: 0.7 MG/DL (ref 0.6–1.1)
EOSINOPHILS ABSOLUTE: 0.1 K/UL (ref 0–0.6)
EOSINOPHILS RELATIVE PERCENT: 0.7 %
GFR AFRICAN AMERICAN: >60
GFR NON-AFRICAN AMERICAN: >60
GLOBULIN: 3.6 G/DL
GLUCOSE BLD-MCNC: 132 MG/DL (ref 70–99)
HCG QUALITATIVE: NEGATIVE
HCT VFR BLD CALC: 44.2 % (ref 36–48)
HEMOGLOBIN: 15.6 G/DL (ref 12–16)
LYMPHOCYTES ABSOLUTE: 2.1 K/UL (ref 1–5.1)
LYMPHOCYTES RELATIVE PERCENT: 17.3 %
MCH RBC QN AUTO: 29.4 PG (ref 26–34)
MCHC RBC AUTO-ENTMCNC: 35.4 G/DL (ref 31–36)
MCV RBC AUTO: 83.2 FL (ref 80–100)
MONOCYTES ABSOLUTE: 0.5 K/UL (ref 0–1.3)
MONOCYTES RELATIVE PERCENT: 3.9 %
NEUTROPHILS ABSOLUTE: 9.6 K/UL (ref 1.7–7.7)
NEUTROPHILS RELATIVE PERCENT: 77.4 %
PDW BLD-RTO: 14.7 % (ref 12.4–15.4)
PLATELET # BLD: 312 K/UL (ref 135–450)
PMV BLD AUTO: 8.1 FL (ref 5–10.5)
POTASSIUM SERPL-SCNC: 3.6 MMOL/L (ref 3.5–5.1)
RBC # BLD: 5.31 M/UL (ref 4–5.2)
SODIUM BLD-SCNC: 138 MMOL/L (ref 136–145)
TOTAL PROTEIN: 7.9 G/DL (ref 6.4–8.2)
WBC # BLD: 12.4 K/UL (ref 4–11)

## 2018-08-19 PROCEDURE — 80053 COMPREHEN METABOLIC PANEL: CPT

## 2018-08-19 PROCEDURE — 6360000002 HC RX W HCPCS: Performed by: EMERGENCY MEDICINE

## 2018-08-19 PROCEDURE — 84703 CHORIONIC GONADOTROPIN ASSAY: CPT

## 2018-08-19 PROCEDURE — 85025 COMPLETE CBC W/AUTO DIFF WBC: CPT

## 2018-08-19 PROCEDURE — 99283 EMERGENCY DEPT VISIT LOW MDM: CPT

## 2018-08-19 PROCEDURE — 96375 TX/PRO/DX INJ NEW DRUG ADDON: CPT

## 2018-08-19 PROCEDURE — 96374 THER/PROPH/DIAG INJ IV PUSH: CPT

## 2018-08-19 RX ORDER — METOCLOPRAMIDE HYDROCHLORIDE 5 MG/ML
10 INJECTION INTRAMUSCULAR; INTRAVENOUS ONCE
Status: COMPLETED | OUTPATIENT
Start: 2018-08-19 | End: 2018-08-19

## 2018-08-19 RX ORDER — IBUPROFEN 600 MG/1
600 TABLET ORAL EVERY 6 HOURS PRN
Qty: 30 TABLET | Refills: 0 | Status: SHIPPED | OUTPATIENT
Start: 2018-08-19 | End: 2019-07-05

## 2018-08-19 RX ORDER — DIPHENHYDRAMINE HYDROCHLORIDE 50 MG/ML
25 INJECTION INTRAMUSCULAR; INTRAVENOUS ONCE
Status: DISCONTINUED | OUTPATIENT
Start: 2018-08-19 | End: 2018-08-19 | Stop reason: HOSPADM

## 2018-08-19 RX ORDER — KETOROLAC TROMETHAMINE 30 MG/ML
30 INJECTION, SOLUTION INTRAMUSCULAR; INTRAVENOUS ONCE
Status: COMPLETED | OUTPATIENT
Start: 2018-08-19 | End: 2018-08-19

## 2018-08-19 RX ADMIN — KETOROLAC TROMETHAMINE 30 MG: 30 INJECTION, SOLUTION INTRAMUSCULAR at 16:42

## 2018-08-19 RX ADMIN — METOCLOPRAMIDE 10 MG: 5 INJECTION, SOLUTION INTRAMUSCULAR; INTRAVENOUS at 15:55

## 2018-08-19 ASSESSMENT — ENCOUNTER SYMPTOMS
NAUSEA: 0
EYE REDNESS: 0
SHORTNESS OF BREATH: 0
BACK PAIN: 0
VOMITING: 0
COUGH: 0
EYE PAIN: 0
FACIAL SWELLING: 1
SORE THROAT: 0
ABDOMINAL PAIN: 0

## 2018-08-19 ASSESSMENT — PAIN SCALES - GENERAL
PAINLEVEL_OUTOF10: 7
PAINLEVEL_OUTOF10: 10

## 2018-08-19 NOTE — ED PROVIDER NOTES
[place] [facility]    Patient: Juliano Welsh  MRN: [id number]  : 1980  Date of Evaluation: 2018  ED Provider: Al Contreras DO    Chief Complaint       Chief Complaint   Patient presents with    Headache     has shunt, has new NSG at [facility] and has appt on tues; states was last here 2days ago, states that has L facial pain/swelling now     HPI     History provided by patient, spouse/SO and previous medical records  Mode of arrival: private car  History limited by no limitations    Juliano Welsh is a 30 y.o. female who presents to the emergency department With complaints of left facial swelling and headache. Patient has a shunt secondary to hydrocephalus. States that it is program of all and it has malfunctioned in the past.  States her facial swelling has been going on for 10 days. She has not seen her neurosurgeon for this problem. She was here on 8/15 for the same. Had negative workup and the patient signed out against medical advice. States that she is still having throbbing pain to the left side of her head. ROS:     Review of Systems   Constitutional: Negative for chills and fever. HENT: Positive for facial swelling. Negative for congestion and sore throat. Eyes: Negative for pain and redness. Respiratory: Negative for cough and shortness of breath. Cardiovascular: Negative for chest pain and palpitations. Gastrointestinal: Negative for abdominal pain, nausea and vomiting. Genitourinary: Negative for dysuria and frequency. Musculoskeletal: Negative for back pain and gait problem. Skin: Negative for pallor and rash. Neurological: Positive for headaches. Negative for dizziness. Psychiatric/Behavioral: Negative for agitation and confusion. All other systems reviewed and are negative.       Past History     Past Medical History:   Diagnosis Date    ADHD (attention deficit hyperactivity disorder) 1988    Anesthesia African American >60 >60    Calcium 9.8 8.3 - 10.6 mg/dL    Total Protein 7.9 6.4 - 8.2 g/dL    Alb 4.3 3.4 - 5.0 g/dL    Albumin/Globulin Ratio 1.2 1.1 - 2.2    Total Bilirubin 0.4 0.0 - 1.0 mg/dL    Alkaline Phosphatase 90 40 - 129 U/L    ALT 15 10 - 40 U/L    AST 13 (L) 15 - 37 U/L    Globulin 3.6 g/dL   HCG Qualitative, Serum   Result Value Ref Range    hCG Qual Negative Detects HCG level >10 MIU/mL     Radiographs:  No results found. Procedures/EKG:   Procedures      ED Course and MDM           MDM  Number of Diagnoses or Management Options  Nonintractable headache, unspecified chronicity pattern, unspecified headache type: established and worsening     Amount and/or Complexity of Data Reviewed  Clinical lab tests: ordered and reviewed  Review and summarize past medical records: yes    Risk of Complications, Morbidity, and/or Mortality  Presenting problems: moderate  Diagnostic procedures: moderate  Management options: low        In brief, Kofi Robles is a 30 y.o. female who presented to the emergency department With complaints of left sided facial pain and headache. On exam, there is no obvious swelling. She was here for the same 2 days ago. She had a normal CT scan at that time. She has not followed up with her neurosurgeon. Her symptoms going on for 10 days in total.  This is not the worst headache of her life. It is typical of other headaches she has had. It was not maximal in onset. 1730 Patient feeling much better. Discussed need to follow-up with her neurosurgeon. Discussed return precautions and need for f/u. Verbalized understanding of diagnosis and discharge plan.       ED Medication Orders     Start Ordered     Status Ordering Provider    08/19/18 1645 08/19/18 1636  ketorolac (TORADOL) injection 30 mg  ONCE      Last MAR action:  Given - by Pat Posey on 08/19/18 at [address] [place], [place] [address]DONALD    08/19/18 1545 08/19/18 1535  diphenhydrAMINE (BENADRYL) injection 25 mg  ONCE      Last MAR

## 2018-08-20 LAB
BLOOD CULTURE, ROUTINE: NORMAL
CULTURE, BLOOD 2: NORMAL

## 2018-08-28 ENCOUNTER — APPOINTMENT (OUTPATIENT)
Dept: GENERAL RADIOLOGY | Age: 38
End: 2018-08-28
Payer: COMMERCIAL

## 2018-08-28 ENCOUNTER — HOSPITAL ENCOUNTER (EMERGENCY)
Age: 38
Discharge: HOME | End: 2018-08-29
Payer: COMMERCIAL

## 2018-08-28 DIAGNOSIS — R10.31 ABDOMINAL PAIN, RIGHT LOWER QUADRANT: Primary | ICD-10-CM

## 2018-08-28 DIAGNOSIS — R11.0 NAUSEA WITHOUT VOMITING: ICD-10-CM

## 2018-08-28 LAB
A/G RATIO: 1 (ref 1.1–2.2)
ALBUMIN SERPL-MCNC: 4.1 G/DL (ref 3.4–5)
ALP BLD-CCNC: 81 U/L (ref 40–129)
ALT SERPL-CCNC: 13 U/L (ref 10–40)
ANION GAP SERPL CALCULATED.3IONS-SCNC: 15 MMOL/L (ref 3–16)
AST SERPL-CCNC: 34 U/L (ref 15–37)
BACTERIA: ABNORMAL /HPF
BASOPHILS ABSOLUTE: 0.1 K/UL (ref 0–0.2)
BASOPHILS RELATIVE PERCENT: 0.7 %
BILIRUB SERPL-MCNC: 0.3 MG/DL (ref 0–1)
BILIRUBIN URINE: NEGATIVE
BLOOD, URINE: ABNORMAL
BUN BLDV-MCNC: 10 MG/DL (ref 7–20)
CALCIUM SERPL-MCNC: 9.8 MG/DL (ref 8.3–10.6)
CHLORIDE BLD-SCNC: 96 MMOL/L (ref 99–110)
CLARITY: CLEAR
CO2: 24 MMOL/L (ref 21–32)
COLOR: YELLOW
CREAT SERPL-MCNC: 0.7 MG/DL (ref 0.6–1.1)
EOSINOPHILS ABSOLUTE: 0.1 K/UL (ref 0–0.6)
EOSINOPHILS RELATIVE PERCENT: 0.9 %
EPITHELIAL CELLS, UA: ABNORMAL /HPF
GFR AFRICAN AMERICAN: >60
GFR NON-AFRICAN AMERICAN: >60
GLOBULIN: 4.1 G/DL
GLUCOSE BLD-MCNC: 108 MG/DL (ref 70–99)
GLUCOSE URINE: NEGATIVE MG/DL
HCT VFR BLD CALC: 47.7 % (ref 36–48)
HEMOGLOBIN: 16.4 G/DL (ref 12–16)
KETONES, URINE: NEGATIVE MG/DL
LACTIC ACID: 1.3 MMOL/L (ref 0.4–2)
LEUKOCYTE ESTERASE, URINE: NEGATIVE
LYMPHOCYTES ABSOLUTE: 3.5 K/UL (ref 1–5.1)
LYMPHOCYTES RELATIVE PERCENT: 31.9 %
MCH RBC QN AUTO: 29.3 PG (ref 26–34)
MCHC RBC AUTO-ENTMCNC: 34.3 G/DL (ref 31–36)
MCV RBC AUTO: 85.4 FL (ref 80–100)
MICROSCOPIC EXAMINATION: YES
MONOCYTES ABSOLUTE: 0.6 K/UL (ref 0–1.3)
MONOCYTES RELATIVE PERCENT: 5.2 %
MUCUS: ABNORMAL /LPF
NEUTROPHILS ABSOLUTE: 6.7 K/UL (ref 1.7–7.7)
NEUTROPHILS RELATIVE PERCENT: 61.3 %
NITRITE, URINE: NEGATIVE
PDW BLD-RTO: 14.2 % (ref 12.4–15.4)
PH UA: 6
PLATELET # BLD: 346 K/UL (ref 135–450)
PMV BLD AUTO: 8 FL (ref 5–10.5)
POTASSIUM REFLEX MAGNESIUM: 5.3 MMOL/L (ref 3.5–5.1)
PROTEIN UA: NEGATIVE MG/DL
RBC # BLD: 5.59 M/UL (ref 4–5.2)
RBC UA: ABNORMAL /HPF (ref 0–2)
SODIUM BLD-SCNC: 135 MMOL/L (ref 136–145)
SPECIFIC GRAVITY UA: 1.01
TOTAL PROTEIN: 8.2 G/DL (ref 6.4–8.2)
URINE REFLEX TO CULTURE: ABNORMAL
URINE TYPE: ABNORMAL
UROBILINOGEN, URINE: 0.2 E.U./DL
WBC # BLD: 11 K/UL (ref 4–11)
WBC UA: ABNORMAL /HPF (ref 0–5)

## 2018-08-28 PROCEDURE — 81001 URINALYSIS AUTO W/SCOPE: CPT

## 2018-08-28 PROCEDURE — 96361 HYDRATE IV INFUSION ADD-ON: CPT

## 2018-08-28 PROCEDURE — 96375 TX/PRO/DX INJ NEW DRUG ADDON: CPT

## 2018-08-28 PROCEDURE — 83605 ASSAY OF LACTIC ACID: CPT

## 2018-08-28 PROCEDURE — 2580000003 HC RX 258: Performed by: NURSE PRACTITIONER

## 2018-08-28 PROCEDURE — 80053 COMPREHEN METABOLIC PANEL: CPT

## 2018-08-28 PROCEDURE — 6360000002 HC RX W HCPCS: Performed by: NURSE PRACTITIONER

## 2018-08-28 PROCEDURE — 96374 THER/PROPH/DIAG INJ IV PUSH: CPT

## 2018-08-28 PROCEDURE — 74018 RADEX ABDOMEN 1 VIEW: CPT

## 2018-08-28 PROCEDURE — 85025 COMPLETE CBC W/AUTO DIFF WBC: CPT

## 2018-08-28 PROCEDURE — 99284 EMERGENCY DEPT VISIT MOD MDM: CPT

## 2018-08-28 RX ORDER — ONDANSETRON 2 MG/ML
4 INJECTION INTRAMUSCULAR; INTRAVENOUS ONCE
Status: COMPLETED | OUTPATIENT
Start: 2018-08-28 | End: 2018-08-28

## 2018-08-28 RX ORDER — KETOROLAC TROMETHAMINE 30 MG/ML
30 INJECTION, SOLUTION INTRAMUSCULAR; INTRAVENOUS ONCE
Status: COMPLETED | OUTPATIENT
Start: 2018-08-28 | End: 2018-08-28

## 2018-08-28 RX ORDER — 0.9 % SODIUM CHLORIDE 0.9 %
1000 INTRAVENOUS SOLUTION INTRAVENOUS ONCE
Status: COMPLETED | OUTPATIENT
Start: 2018-08-28 | End: 2018-08-29

## 2018-08-28 RX ADMIN — KETOROLAC TROMETHAMINE 30 MG: 30 INJECTION, SOLUTION INTRAMUSCULAR at 22:25

## 2018-08-28 RX ADMIN — SODIUM CHLORIDE 1000 ML: 9 INJECTION, SOLUTION INTRAVENOUS at 22:25

## 2018-08-28 RX ADMIN — ONDANSETRON HYDROCHLORIDE 4 MG: 2 INJECTION, SOLUTION INTRAMUSCULAR; INTRAVENOUS at 22:25

## 2018-08-28 ASSESSMENT — ENCOUNTER SYMPTOMS
VOMITING: 0
SORE THROAT: 0
NAUSEA: 1
ABDOMINAL PAIN: 1
DIARRHEA: 0
BACK PAIN: 0
SHORTNESS OF BREATH: 0
COUGH: 0
WHEEZING: 0
COLOR CHANGE: 0

## 2018-08-28 ASSESSMENT — PAIN SCALES - GENERAL
PAINLEVEL_OUTOF10: 9
PAINLEVEL_OUTOF10: 9

## 2018-08-28 ASSESSMENT — PAIN DESCRIPTION - FREQUENCY: FREQUENCY: CONTINUOUS

## 2018-08-28 ASSESSMENT — PAIN DESCRIPTION - PROGRESSION: CLINICAL_PROGRESSION: GRADUALLY WORSENING

## 2018-08-28 ASSESSMENT — PAIN DESCRIPTION - PAIN TYPE: TYPE: ACUTE PAIN

## 2018-08-28 ASSESSMENT — PAIN DESCRIPTION - ONSET: ONSET: ON-GOING

## 2018-08-28 ASSESSMENT — PAIN DESCRIPTION - LOCATION: LOCATION: ABDOMEN

## 2018-08-28 ASSESSMENT — PAIN DESCRIPTION - DESCRIPTORS: DESCRIPTORS: SHARP;SHOOTING

## 2018-08-28 ASSESSMENT — PAIN DESCRIPTION - ORIENTATION: ORIENTATION: RIGHT;LOWER

## 2018-08-29 ENCOUNTER — APPOINTMENT (OUTPATIENT)
Dept: CT IMAGING | Age: 38
End: 2018-08-29
Payer: COMMERCIAL

## 2018-08-29 VITALS
BODY MASS INDEX: 30.24 KG/M2 | OXYGEN SATURATION: 99 % | HEART RATE: 83 BPM | WEIGHT: 150 LBS | DIASTOLIC BLOOD PRESSURE: 79 MMHG | RESPIRATION RATE: 17 BRPM | HEIGHT: 59 IN | TEMPERATURE: 98.9 F | SYSTOLIC BLOOD PRESSURE: 118 MMHG

## 2018-08-29 PROCEDURE — 96372 THER/PROPH/DIAG INJ SC/IM: CPT

## 2018-08-29 PROCEDURE — 96376 TX/PRO/DX INJ SAME DRUG ADON: CPT

## 2018-08-29 PROCEDURE — 74177 CT ABD & PELVIS W/CONTRAST: CPT

## 2018-08-29 PROCEDURE — 6360000004 HC RX CONTRAST MEDICATION: Performed by: NURSE PRACTITIONER

## 2018-08-29 PROCEDURE — 96375 TX/PRO/DX INJ NEW DRUG ADDON: CPT

## 2018-08-29 PROCEDURE — 6360000002 HC RX W HCPCS: Performed by: NURSE PRACTITIONER

## 2018-08-29 RX ORDER — PROMETHAZINE HYDROCHLORIDE 25 MG/ML
25 INJECTION, SOLUTION INTRAMUSCULAR; INTRAVENOUS ONCE
Status: COMPLETED | OUTPATIENT
Start: 2018-08-29 | End: 2018-08-29

## 2018-08-29 RX ORDER — ONDANSETRON 2 MG/ML
4 INJECTION INTRAMUSCULAR; INTRAVENOUS ONCE
Status: COMPLETED | OUTPATIENT
Start: 2018-08-29 | End: 2018-08-29

## 2018-08-29 RX ORDER — PROMETHAZINE HYDROCHLORIDE 25 MG/1
25 TABLET ORAL EVERY 6 HOURS PRN
Qty: 20 TABLET | Refills: 0 | Status: SHIPPED | OUTPATIENT
Start: 2018-08-29 | End: 2018-09-05

## 2018-08-29 RX ADMIN — PROMETHAZINE HYDROCHLORIDE 25 MG: 25 INJECTION INTRAMUSCULAR; INTRAVENOUS at 01:43

## 2018-08-29 RX ADMIN — ONDANSETRON 4 MG: 2 INJECTION INTRAMUSCULAR; INTRAVENOUS at 00:29

## 2018-08-29 RX ADMIN — HYDROMORPHONE HYDROCHLORIDE 1 MG: 1 INJECTION, SOLUTION INTRAMUSCULAR; INTRAVENOUS; SUBCUTANEOUS at 00:28

## 2018-08-29 RX ADMIN — IOPAMIDOL 75 ML: 755 INJECTION, SOLUTION INTRAVENOUS at 00:40

## 2018-08-29 ASSESSMENT — PAIN DESCRIPTION - PAIN TYPE: TYPE: ACUTE PAIN

## 2018-08-29 ASSESSMENT — PAIN SCALES - GENERAL
PAINLEVEL_OUTOF10: 8
PAINLEVEL_OUTOF10: 8
PAINLEVEL_OUTOF10: 7

## 2018-08-29 ASSESSMENT — PAIN DESCRIPTION - LOCATION: LOCATION: ABDOMEN

## 2018-08-29 NOTE — ED PROVIDER NOTES
color change. Neurological: Negative for weakness, numbness and headaches. Positives and Pertinent negatives as per HPI. Except as noted above in the ROS, problem specific ROS was completed and is negative. Physical Exam:  Physical Exam   Constitutional: She is oriented to person, place, and time. She appears well-developed and well-nourished. HENT:   Head: Normocephalic. Right Ear: External ear normal.   Left Ear: External ear normal.   Mouth/Throat: Oropharynx is clear and moist.   Eyes: Right eye exhibits no discharge. Left eye exhibits no discharge. Neck: Normal range of motion. Neck supple. Cardiovascular:   Patient has normal S1 and 2, initially tachycardic at 130 bpm.   Pulmonary/Chest: Effort normal and breath sounds normal. No respiratory distress. She has no wheezes. Abdominal: Soft. Bowel sounds are normal. There is tenderness. There is no guarding. Abdomen soft and protuberant. Bowel sounds positive. Reducible tenderness in the right CVA and right flank. No acute ascites or rigidity. Musculoskeletal: Normal range of motion. Neurological: She is alert and oriented to person, place, and time. GCS eye subscore is 4. GCS verbal subscore is 5. GCS motor subscore is 6. Skin: Skin is warm. She is not diaphoretic. No pallor. Psychiatric: She has a normal mood and affect. Her behavior is normal.   Nursing note and vitals reviewed.       MEDICAL DECISION MAKING    Vitals:    Vitals:    08/28/18 2004 08/28/18 2228 08/29/18 0029 08/29/18 0130   BP: 102/65 (!) 123/94 (!) 131/92 118/79   Pulse: 133 87 96 83   Resp: 15 16 16 17   Temp: 98.9 °F (37.2 °C)      TempSrc: Oral      SpO2: 98% 99% 100% 99%   Weight: 150 lb (68 kg)      Height: 4' 11\" (1.499 m)          LABS:   Labs Reviewed   CBC WITH AUTO DIFFERENTIAL - Abnormal; Notable for the following:        Result Value    RBC 5.59 (*)     Hemoglobin 16.4 (*)     All other components within normal limits    Narrative:     Performed Reason for Exam: left face and eye swelling Acuity: Acute Type of Exam: Initial Relevant Medical/Surgical History: hx of multiple shunts x's she was a baby FINDINGS: BRAIN/VENTRICLES: There is no acute intracranial hemorrhage, mass effect or midline shift. No abnormal extra-axial fluid collection. The gray-white differentiation is maintained without evidence of an acute infarct. There is no evidence of hydrocephalus. Ventricular shunt again visualized entering on the left side and crossing the midline through the left lateral ventricle terminating near the right frontal horn. The ventricles are unchanged in size. ORBITS: The visualized portion of the orbits demonstrate no acute abnormality. SINUSES: The visualized paranasal sinuses and mastoid air cells demonstrate no acute abnormality. SOFT TISSUES/SKULL:  No acute abnormality of the visualized skull or soft tissues. No acute intracranial abnormality. Ventricular shunt remains in place, not changed in position. The ventricles are unchanged in size. Xr Chest 1 Vw    Result Date: 8/15/2018  EXAMINATION: SINGLE XRAY VIEW OF THE CHEST 8/15/2018 5:36 pm COMPARISON: 06/30/2018 HISTORY: ORDERING SYSTEM PROVIDED HISTORY: evalk for shunt malfunction TECHNOLOGIST PROVIDED HISTORY: Reason for exam:->evalk for shunt malfunction Ordering Physician Provided Reason for Exam: Evaluate shunt FINDINGS: Shunt tubing overlies the left hemithorax. The lungs are without acute focal process. There is no effusion or pneumothorax. The cardiomediastinal silhouette is stable. The osseous structures are stable. No acute process.      Xr Skull (<4 Views)    Result Date: 8/15/2018  EXAMINATION: 3 XRAY VIEWS OF THE SKULL; XRAY VIEWS OF THE CERVICAL SPINE 8/15/2018 5:36 pm COMPARISON: 06/30/2018 HISTORY: ORDERING SYSTEM PROVIDED HISTORY: eval for shunt disruption TECHNOLOGIST PROVIDED HISTORY: Reason for exam:->eval for shunt disruption Ordering Physician Provided Reason for identified abnormalities the abdomen or pelvis. Upon reevaluation she is feeling nauseous, she was ordered Phenergan. However, at this time was for appendicitis, pancreas areas, diverticulitis, sinus, bowel infection or intussusception. Therefore, shared medical decision was made between the patient myself we agreed she could be discharged home with outpatient follow-up. Patient was discharged home with protrusion for Phenergan. Educated take her home medicines as prescribed. The patient tolerated their visit well. I evaluated the patient. The physician was available for consultation as needed. The patient and / or the family were informed of the results of any tests, a time was given to answer questions, a plan was proposed and they agreed with plan. Patient verbalized nursing of discharge instructions and was discharged from the department stable condition. CLINICAL IMPRESSION:  1. Abdominal pain, right lower quadrant    2. Nausea without vomiting        DISPOSITION        PATIENT REFERRED TO:  FREDRICK Hoffman CNP  [address]  [phone]    Schedule an appointment as soon as possible for a visit in 2 days  follow up with your family doctor the next 2 days reevaluation      DISCHARGE MEDICATIONS:  New Prescriptions    PROMETHAZINE (PHENERGAN) 25 MG TABLET    Take 1 tablet by mouth every 6 hours as needed for Nausea WARNING:  May cause drowsiness. May impair ability to operate vehicles or machinery. Do not use in combination with alcohol.        DISCONTINUED MEDICATIONS:  Discontinued Medications    No medications on file              (Please note the MDM and HPI sections of this note were completed with a voice recognition program.  Efforts were made to edit the dictations but occasionally words are mis-transcribed.)    Electronically signed, FREDRICK George CNP,         FREDRICK George CNP  08/29/18 6008

## 2019-03-21 ENCOUNTER — HOSPITAL ENCOUNTER (EMERGENCY)
Age: 39
Discharge: HOME | End: 2019-03-22
Attending: EMERGENCY MEDICINE
Payer: MEDICAID

## 2019-03-21 DIAGNOSIS — Z87.19 S/P HEMORRHOIDECTOMY: ICD-10-CM

## 2019-03-21 DIAGNOSIS — K59.00 CONSTIPATION, UNSPECIFIED CONSTIPATION TYPE: Primary | ICD-10-CM

## 2019-03-21 DIAGNOSIS — K62.89 ANAL PAIN: ICD-10-CM

## 2019-03-21 DIAGNOSIS — Z98.890 S/P HEMORRHOIDECTOMY: ICD-10-CM

## 2019-03-21 PROCEDURE — 85025 COMPLETE CBC W/AUTO DIFF WBC: CPT

## 2019-03-21 PROCEDURE — 99283 EMERGENCY DEPT VISIT LOW MDM: CPT

## 2019-03-21 PROCEDURE — 80053 COMPREHEN METABOLIC PANEL: CPT

## 2019-03-21 RX ORDER — POLYETHYLENE GLYCOL 3350 17 G/17G
17 POWDER, FOR SOLUTION ORAL DAILY PRN
COMMUNITY
End: 2019-05-31

## 2019-03-21 RX ORDER — DOCUSATE SODIUM 100 MG/1
100 CAPSULE, LIQUID FILLED ORAL 2 TIMES DAILY
COMMUNITY
End: 2019-05-31

## 2019-03-21 RX ORDER — 0.9 % SODIUM CHLORIDE 0.9 %
1000 INTRAVENOUS SOLUTION INTRAVENOUS ONCE
Status: COMPLETED | OUTPATIENT
Start: 2019-03-21 | End: 2019-03-22

## 2019-03-21 ASSESSMENT — PAIN SCALES - GENERAL: PAINLEVEL_OUTOF10: 10

## 2019-03-21 ASSESSMENT — PAIN DESCRIPTION - PAIN TYPE: TYPE: ACUTE PAIN

## 2019-03-21 ASSESSMENT — PAIN DESCRIPTION - LOCATION: LOCATION: ABDOMEN

## 2019-03-22 ENCOUNTER — APPOINTMENT (OUTPATIENT)
Dept: CT IMAGING | Age: 39
End: 2019-03-22
Payer: MEDICAID

## 2019-03-22 VITALS
RESPIRATION RATE: 16 BRPM | BODY MASS INDEX: 30.3 KG/M2 | SYSTOLIC BLOOD PRESSURE: 110 MMHG | DIASTOLIC BLOOD PRESSURE: 80 MMHG | TEMPERATURE: 98.8 F | WEIGHT: 150 LBS | OXYGEN SATURATION: 96 % | HEART RATE: 90 BPM

## 2019-03-22 LAB
A/G RATIO: 1.2 (ref 1.1–2.2)
ALBUMIN SERPL-MCNC: 4.1 G/DL (ref 3.4–5)
ALP BLD-CCNC: 177 U/L (ref 40–129)
ALT SERPL-CCNC: 167 U/L (ref 10–40)
ANION GAP SERPL CALCULATED.3IONS-SCNC: 13 MMOL/L (ref 3–16)
AST SERPL-CCNC: 60 U/L (ref 15–37)
BASOPHILS ABSOLUTE: 0.1 K/UL (ref 0–0.2)
BASOPHILS RELATIVE PERCENT: 1.1 %
BILIRUB SERPL-MCNC: 0.9 MG/DL (ref 0–1)
BILIRUBIN URINE: NEGATIVE
BLOOD, URINE: NEGATIVE
BUN BLDV-MCNC: 13 MG/DL (ref 7–20)
CALCIUM SERPL-MCNC: 9.4 MG/DL (ref 8.3–10.6)
CHLORIDE BLD-SCNC: 99 MMOL/L (ref 99–110)
CLARITY: CLEAR
CO2: 25 MMOL/L (ref 21–32)
COLOR: YELLOW
CREAT SERPL-MCNC: 0.6 MG/DL (ref 0.6–1.1)
EOSINOPHILS ABSOLUTE: 0.2 K/UL (ref 0–0.6)
EOSINOPHILS RELATIVE PERCENT: 2.2 %
GFR AFRICAN AMERICAN: >60
GFR NON-AFRICAN AMERICAN: >60
GLOBULIN: 3.3 G/DL
GLUCOSE BLD-MCNC: 146 MG/DL (ref 70–99)
GLUCOSE URINE: NEGATIVE MG/DL
HCT VFR BLD CALC: 40.7 % (ref 36–48)
HEMOGLOBIN: 14.1 G/DL (ref 12–16)
KETONES, URINE: NEGATIVE MG/DL
LEUKOCYTE ESTERASE, URINE: NEGATIVE
LYMPHOCYTES ABSOLUTE: 4.2 K/UL (ref 1–5.1)
LYMPHOCYTES RELATIVE PERCENT: 37.9 %
MCH RBC QN AUTO: 30.4 PG (ref 26–34)
MCHC RBC AUTO-ENTMCNC: 34.7 G/DL (ref 31–36)
MCV RBC AUTO: 87.6 FL (ref 80–100)
MICROSCOPIC EXAMINATION: NORMAL
MONOCYTES ABSOLUTE: 0.6 K/UL (ref 0–1.3)
MONOCYTES RELATIVE PERCENT: 5.5 %
NEUTROPHILS ABSOLUTE: 5.9 K/UL (ref 1.7–7.7)
NEUTROPHILS RELATIVE PERCENT: 53.3 %
NITRITE, URINE: NEGATIVE
PDW BLD-RTO: 14.8 % (ref 12.4–15.4)
PH UA: 6 (ref 5–8)
PLATELET # BLD: 252 K/UL (ref 135–450)
PMV BLD AUTO: 7.8 FL (ref 5–10.5)
POTASSIUM REFLEX MAGNESIUM: 3.8 MMOL/L (ref 3.5–5.1)
PROTEIN UA: NEGATIVE MG/DL
RBC # BLD: 4.64 M/UL (ref 4–5.2)
SODIUM BLD-SCNC: 137 MMOL/L (ref 136–145)
SPECIFIC GRAVITY UA: 1.01 (ref 1–1.03)
TOTAL PROTEIN: 7.4 G/DL (ref 6.4–8.2)
URINE REFLEX TO CULTURE: NORMAL
URINE TYPE: NORMAL
UROBILINOGEN, URINE: 0.2 E.U./DL
WBC # BLD: 11 K/UL (ref 4–11)

## 2019-03-22 PROCEDURE — 2580000003 HC RX 258: Performed by: PHYSICIAN ASSISTANT

## 2019-03-22 PROCEDURE — 74177 CT ABD & PELVIS W/CONTRAST: CPT

## 2019-03-22 PROCEDURE — 96360 HYDRATION IV INFUSION INIT: CPT

## 2019-03-22 PROCEDURE — 51798 US URINE CAPACITY MEASURE: CPT

## 2019-03-22 PROCEDURE — 6370000000 HC RX 637 (ALT 250 FOR IP): Performed by: EMERGENCY MEDICINE

## 2019-03-22 PROCEDURE — 81003 URINALYSIS AUTO W/O SCOPE: CPT

## 2019-03-22 PROCEDURE — 6360000004 HC RX CONTRAST MEDICATION: Performed by: EMERGENCY MEDICINE

## 2019-03-22 PROCEDURE — 96361 HYDRATE IV INFUSION ADD-ON: CPT

## 2019-03-22 RX ORDER — ONDANSETRON 4 MG/1
4 TABLET, ORALLY DISINTEGRATING ORAL ONCE
Status: COMPLETED | OUTPATIENT
Start: 2019-03-22 | End: 2019-03-22

## 2019-03-22 RX ORDER — OXYCODONE HYDROCHLORIDE AND ACETAMINOPHEN 5; 325 MG/1; MG/1
2 TABLET ORAL ONCE
Status: COMPLETED | OUTPATIENT
Start: 2019-03-22 | End: 2019-03-22

## 2019-03-22 RX ADMIN — SODIUM CHLORIDE 1000 ML: 9 INJECTION, SOLUTION INTRAVENOUS at 00:00

## 2019-03-22 RX ADMIN — OXYCODONE HYDROCHLORIDE AND ACETAMINOPHEN 2 TABLET: 5; 325 TABLET ORAL at 02:05

## 2019-03-22 RX ADMIN — ONDANSETRON 4 MG: 4 TABLET, ORALLY DISINTEGRATING ORAL at 02:04

## 2019-03-22 RX ADMIN — IOPAMIDOL 75 ML: 755 INJECTION, SOLUTION INTRAVENOUS at 00:41

## 2019-03-22 ASSESSMENT — ENCOUNTER SYMPTOMS
CONSTIPATION: 1
SHORTNESS OF BREATH: 0
DIARRHEA: 0
RECTAL PAIN: 1
EYES NEGATIVE: 1
ABDOMINAL PAIN: 0
COLOR CHANGE: 0
ANAL BLEEDING: 1
NAUSEA: 0
VOMITING: 0
BACK PAIN: 0

## 2019-03-22 ASSESSMENT — PAIN SCALES - GENERAL: PAINLEVEL_OUTOF10: 10

## 2019-05-31 ENCOUNTER — HOSPITAL ENCOUNTER (EMERGENCY)
Age: 39
Discharge: HOME | End: 2019-06-01
Attending: EMERGENCY MEDICINE
Payer: MEDICAID

## 2019-05-31 DIAGNOSIS — N20.0 KIDNEY STONE: Primary | ICD-10-CM

## 2019-05-31 DIAGNOSIS — R10.9 RIGHT FLANK PAIN: ICD-10-CM

## 2019-05-31 PROCEDURE — 80053 COMPREHEN METABOLIC PANEL: CPT

## 2019-05-31 PROCEDURE — 85025 COMPLETE CBC W/AUTO DIFF WBC: CPT

## 2019-05-31 PROCEDURE — 99284 EMERGENCY DEPT VISIT MOD MDM: CPT

## 2019-05-31 PROCEDURE — 84703 CHORIONIC GONADOTROPIN ASSAY: CPT

## 2019-05-31 RX ORDER — 0.9 % SODIUM CHLORIDE 0.9 %
1000 INTRAVENOUS SOLUTION INTRAVENOUS ONCE
Status: COMPLETED | OUTPATIENT
Start: 2019-06-01 | End: 2019-06-01

## 2019-05-31 ASSESSMENT — PAIN DESCRIPTION - ORIENTATION: ORIENTATION: RIGHT

## 2019-05-31 ASSESSMENT — PAIN DESCRIPTION - PAIN TYPE: TYPE: ACUTE PAIN

## 2019-05-31 ASSESSMENT — PAIN SCALES - GENERAL: PAINLEVEL_OUTOF10: 9

## 2019-05-31 ASSESSMENT — PAIN DESCRIPTION - LOCATION: LOCATION: FLANK

## 2019-06-01 ENCOUNTER — APPOINTMENT (OUTPATIENT)
Dept: CT IMAGING | Age: 39
End: 2019-06-01
Payer: MEDICAID

## 2019-06-01 VITALS
TEMPERATURE: 98.5 F | HEIGHT: 59 IN | BODY MASS INDEX: 31.25 KG/M2 | DIASTOLIC BLOOD PRESSURE: 70 MMHG | WEIGHT: 155 LBS | RESPIRATION RATE: 15 BRPM | OXYGEN SATURATION: 99 % | HEART RATE: 89 BPM | SYSTOLIC BLOOD PRESSURE: 129 MMHG

## 2019-06-01 LAB
A/G RATIO: 1.4 (ref 1.1–2.2)
ALBUMIN SERPL-MCNC: 4.3 G/DL (ref 3.4–5)
ALP BLD-CCNC: 79 U/L (ref 40–129)
ALT SERPL-CCNC: 21 U/L (ref 10–40)
ANION GAP SERPL CALCULATED.3IONS-SCNC: 11 MMOL/L (ref 3–16)
AST SERPL-CCNC: 18 U/L (ref 15–37)
BASOPHILS ABSOLUTE: 0.1 K/UL (ref 0–0.2)
BASOPHILS RELATIVE PERCENT: 1.1 %
BILIRUB SERPL-MCNC: 0.3 MG/DL (ref 0–1)
BILIRUBIN URINE: NEGATIVE
BLOOD, URINE: NEGATIVE
BUN BLDV-MCNC: 18 MG/DL (ref 7–20)
CALCIUM SERPL-MCNC: 9.3 MG/DL (ref 8.3–10.6)
CHLORIDE BLD-SCNC: 102 MMOL/L (ref 99–110)
CLARITY: ABNORMAL
CO2: 27 MMOL/L (ref 21–32)
COLOR: YELLOW
CREAT SERPL-MCNC: 0.7 MG/DL (ref 0.6–1.1)
EOSINOPHILS ABSOLUTE: 0.2 K/UL (ref 0–0.6)
EOSINOPHILS RELATIVE PERCENT: 1.8 %
GFR AFRICAN AMERICAN: >60
GFR NON-AFRICAN AMERICAN: >60
GLOBULIN: 3 G/DL
GLUCOSE BLD-MCNC: 157 MG/DL (ref 70–99)
GLUCOSE URINE: NEGATIVE MG/DL
HCG QUALITATIVE: NEGATIVE
HCT VFR BLD CALC: 42.9 % (ref 36–48)
HEMOGLOBIN: 15.1 G/DL (ref 12–16)
KETONES, URINE: NEGATIVE MG/DL
LEUKOCYTE ESTERASE, URINE: NEGATIVE
LYMPHOCYTES ABSOLUTE: 3.7 K/UL (ref 1–5.1)
LYMPHOCYTES RELATIVE PERCENT: 34.4 %
MCH RBC QN AUTO: 30.7 PG (ref 26–34)
MCHC RBC AUTO-ENTMCNC: 35.3 G/DL (ref 31–36)
MCV RBC AUTO: 87 FL (ref 80–100)
MICROSCOPIC EXAMINATION: ABNORMAL
MONOCYTES ABSOLUTE: 0.6 K/UL (ref 0–1.3)
MONOCYTES RELATIVE PERCENT: 5.7 %
NEUTROPHILS ABSOLUTE: 6.2 K/UL (ref 1.7–7.7)
NEUTROPHILS RELATIVE PERCENT: 57 %
NITRITE, URINE: NEGATIVE
PDW BLD-RTO: 13.2 % (ref 12.4–15.4)
PH UA: 6 (ref 5–8)
PLATELET # BLD: 266 K/UL (ref 135–450)
PMV BLD AUTO: 7.6 FL (ref 5–10.5)
POTASSIUM SERPL-SCNC: 3.5 MMOL/L (ref 3.5–5.1)
PROTEIN UA: NEGATIVE MG/DL
RBC # BLD: 4.93 M/UL (ref 4–5.2)
SODIUM BLD-SCNC: 140 MMOL/L (ref 136–145)
SPECIFIC GRAVITY UA: 1.02 (ref 1–1.03)
TOTAL PROTEIN: 7.3 G/DL (ref 6.4–8.2)
URINE TYPE: ABNORMAL
UROBILINOGEN, URINE: 0.2 E.U./DL
WBC # BLD: 10.9 K/UL (ref 4–11)

## 2019-06-01 PROCEDURE — 74176 CT ABD & PELVIS W/O CONTRAST: CPT

## 2019-06-01 PROCEDURE — 6370000000 HC RX 637 (ALT 250 FOR IP): Performed by: EMERGENCY MEDICINE

## 2019-06-01 PROCEDURE — 81003 URINALYSIS AUTO W/O SCOPE: CPT

## 2019-06-01 PROCEDURE — 96375 TX/PRO/DX INJ NEW DRUG ADDON: CPT

## 2019-06-01 PROCEDURE — 6360000002 HC RX W HCPCS: Performed by: EMERGENCY MEDICINE

## 2019-06-01 PROCEDURE — 2580000003 HC RX 258: Performed by: EMERGENCY MEDICINE

## 2019-06-01 PROCEDURE — 96374 THER/PROPH/DIAG INJ IV PUSH: CPT

## 2019-06-01 RX ORDER — ONDANSETRON 4 MG/1
4 TABLET, ORALLY DISINTEGRATING ORAL EVERY 8 HOURS PRN
Qty: 8 TABLET | Refills: 0 | Status: SHIPPED | OUTPATIENT
Start: 2019-06-01 | End: 2019-07-05 | Stop reason: ALTCHOICE

## 2019-06-01 RX ORDER — ONDANSETRON 2 MG/ML
4 INJECTION INTRAMUSCULAR; INTRAVENOUS ONCE
Status: COMPLETED | OUTPATIENT
Start: 2019-06-01 | End: 2019-06-01

## 2019-06-01 RX ORDER — ACETAMINOPHEN AND CODEINE PHOSPHATE 300; 30 MG/1; MG/1
1 TABLET ORAL ONCE
Status: COMPLETED | OUTPATIENT
Start: 2019-06-01 | End: 2019-06-01

## 2019-06-01 RX ORDER — ACETAMINOPHEN AND CODEINE PHOSPHATE 300; 30 MG/1; MG/1
1 TABLET ORAL EVERY 4 HOURS PRN
Qty: 12 TABLET | Refills: 0 | Status: SHIPPED | OUTPATIENT
Start: 2019-06-01 | End: 2019-06-06

## 2019-06-01 RX ORDER — MORPHINE SULFATE 4 MG/ML
4 INJECTION, SOLUTION INTRAMUSCULAR; INTRAVENOUS ONCE
Status: DISCONTINUED | OUTPATIENT
Start: 2019-06-01 | End: 2019-06-01

## 2019-06-01 RX ORDER — KETOROLAC TROMETHAMINE 30 MG/ML
30 INJECTION, SOLUTION INTRAMUSCULAR; INTRAVENOUS ONCE
Status: COMPLETED | OUTPATIENT
Start: 2019-06-01 | End: 2019-06-01

## 2019-06-01 RX ORDER — TAMSULOSIN HYDROCHLORIDE 0.4 MG/1
0.4 CAPSULE ORAL DAILY
Qty: 7 CAPSULE | Refills: 0 | Status: SHIPPED | OUTPATIENT
Start: 2019-06-01 | End: 2019-07-05

## 2019-06-01 RX ORDER — DIPHENHYDRAMINE HYDROCHLORIDE 50 MG/ML
12.5 INJECTION INTRAMUSCULAR; INTRAVENOUS ONCE
Status: COMPLETED | OUTPATIENT
Start: 2019-06-01 | End: 2019-06-01

## 2019-06-01 RX ADMIN — SODIUM CHLORIDE 1000 ML: 9 INJECTION, SOLUTION INTRAVENOUS at 00:03

## 2019-06-01 RX ADMIN — DIPHENHYDRAMINE HYDROCHLORIDE 12.5 MG: 50 INJECTION, SOLUTION INTRAMUSCULAR; INTRAVENOUS at 00:32

## 2019-06-01 RX ADMIN — KETOROLAC TROMETHAMINE 30 MG: 30 INJECTION, SOLUTION INTRAMUSCULAR at 00:32

## 2019-06-01 RX ADMIN — ONDANSETRON 4 MG: 2 INJECTION INTRAMUSCULAR; INTRAVENOUS at 00:32

## 2019-06-01 RX ADMIN — ACETAMINOPHEN AND CODEINE PHOSPHATE 1 TABLET: 300; 30 TABLET ORAL at 01:31

## 2019-06-01 ASSESSMENT — PAIN SCALES - GENERAL
PAINLEVEL_OUTOF10: 4
PAINLEVEL_OUTOF10: 9
PAINLEVEL_OUTOF10: 8

## 2019-06-06 NOTE — ED PROVIDER NOTES
CHIEF COMPLAINT  Flank Pain (right. states has been hurting for a week history of kidney stones )      HISTORY OF PRESENT ILLNESS  Rajesh Le is a 41 y.o. female who presents to the ED complaining of Right flank pain on and off for the past week. She does a history of kidney stones. Denies any nausea or vomiting. No urinary issues and no issues with bowel movements. No abdominal pain. No fevers chills no chest pain or shortness of breath. .   No other complaints, modifying factors or associated symptoms. I have reviewed the following from the nursing documentation. Past Medical History:   Diagnosis Date    ADHD (attention deficit hyperactivity disorder) 82    Anesthesia complication     pt states with general anesthesia, she gets very nauseated and wakes up with a migrain     Anxiety     Difficult intubation     Functional ovarian cysts     rt ovary cyst x 2 yrs.     Headache(784.0)     migraines    Hiatal hernia     History of blood transfusion     History of kidney stones     History of PCOS     Hydrocephalus     Irritable bowel syndrome     had colonoscopy about 6 yrs ago    Meningitis     PONV (postoperative nausea and vomiting)     only with general anesthesia    Primary osteoarthritis of left knee 2016    S/P cone biopsy of cervix     Scoliosis .s    Tobacco abuse 2018     (ventriculoperitoneal) shunt status     Wears glasses     reading     Past Surgical History:   Procedure Laterality Date    APPENDECTOMY  2003    appendicitis     SECTION      placenta previa    CHOLECYSTECTOMY      COLONOSCOPY  2004    COLPOSCOPY      CSF SHUNT      replaced at age 17    CYSTOSCOPY      stone removal   [address]    inguinal    HYSTERECTOMY, TOTAL ABDOMINAL  2016    TAHBSO, adhesions/PCOS    KNEE ARTHROSCOPY Left 2015    medial meniscectomy, chondroplasty, plica resection    OTHER SURGICAL HISTORY  10/19/2016    op lap    VENTRICULOPERITONEAL SHUNT      multiple revisions     Family History   Problem Relation Age of Onset    High Blood Pressure Mother     Diabetes Mother     High Cholesterol Mother     Depression Mother     Diabetes Maternal Grandmother     High Blood Pressure Maternal Grandmother     High Cholesterol Maternal Grandmother     Heart Disease Maternal Grandfather     High Blood Pressure Maternal Grandfather     Heart Disease Paternal Grandmother     Stroke Paternal Grandfather     Depression Sister     Rheum Arthritis Neg Hx     Osteoarthritis Neg Hx     Asthma Neg Hx     Breast Cancer Neg Hx     Cancer Neg Hx     Heart Failure Neg Hx     Hypertension Neg Hx     Migraines Neg Hx     Ovarian Cancer Neg Hx     Rashes/Skin Problems Neg Hx     Seizures Neg Hx     Thyroid Disease Neg Hx      Social History     Socioeconomic History    Marital status: Single     Spouse name: Not on file    Number of children: Not on file    Years of education: Not on file    Highest education level: Not on file   Occupational History    Not on file   Social Needs    Financial resource strain: Not on file    Food insecurity:     Worry: Not on file     Inability: Not on file    Transportation needs:     Medical: Not on file     Non-medical: Not on file   Tobacco Use    Smoking status: Current Every Day Smoker     Packs/day: 0.50     Years: 18.00     Pack years: 9.00     Types: Cigarettes    Smokeless tobacco: Never Used   Substance and Sexual Activity    Alcohol use: No     Alcohol/week: 0.0 oz    Drug use: No    Sexual activity: Yes     Partners: Male   Lifestyle    Physical activity:     Days per week: Not on file     Minutes per session: Not on file    Stress: Not on file   Relationships    Social connections:     Talks on phone: Not on file     Gets together: Not on file     Attends Protestant service: Not on file     Active member of club or organization: Not on file     Attends meetings of clubs or organizations: Not on file     Relationship status: Not on file    Intimate partner violence:     Fear of current or ex partner: Not on file     Emotionally abused: Not on file     Physically abused: Not on file     Forced sexual activity: Not on file   Other Topics Concern    Not on file   Social History Narrative    Not on file     No current facility-administered medications for this encounter. Current Outpatient Medications   Medication Sig Dispense Refill    tamsulosin (FLOMAX) 0.4 MG capsule Take 1 capsule by mouth daily for 7 days 7 capsule 0    acetaminophen-codeine (TYLENOL/CODEINE #3) 300-30 MG per tablet Take 1 tablet by mouth every 4 hours as needed for Pain for up to 5 days. Intended supply: 5 days. Take lowest dose possible to manage pain 12 tablet 0    ondansetron (ZOFRAN ODT) 4 MG disintegrating tablet Place 1 tablet under the tongue every 8 hours as needed for Nausea or Vomiting 8 tablet 0    ibuprofen (ADVIL;MOTRIN) 600 MG tablet Take 1 tablet by mouth every 6 hours as needed for Pain 30 tablet 0     Allergies   Allergen Reactions    Bentyl [Dicyclomine Hcl] Shortness Of Breath and Anxiety    Mushroom Extract Complex Anaphylaxis     Can't eat mushrooms.  Sulfa Antibiotics Shortness Of Breath    Bee Venom Swelling    Morphine Hives and Itching    Toradol [Ketorolac Tromethamine] Itching    Vancomycin Hives    Ceftaroline Rash    Daptomycin Swelling and Rash    Dicyclomine Anxiety    Fioricet-Codeine [Butalbital-Apap-Caff-Cod] Anxiety    Prochlorperazine Anxiety    Vicodin [Hydrocodone-Acetaminophen] Nausea And Vomiting       REVIEW OF SYSTEMS  10 systems reviewed, pertinent positives per HPI otherwise noted to be negative. PHYSICAL EXAM  /70   Pulse 89   Temp 98.5 °F (36.9 °C) (Oral)   Resp 15   Ht 4' 11\" (1.499 m)   Wt 155 lb (70.3 kg)   LMP 10/31/2016 (Exact Date)   SpO2 99%   BMI 31.31 kg/m²   GENERAL APPEARANCE: Awake and alert. Cooperative. No acute distress. HEAD: Normocephalic. Atraumatic. EYES: PERRL. EOM's grossly intact. ENT: Mucous membranes are moist.   NECK: Supple. HEART: RRR. LUNGS: Respirations unlabored. CTAB. Good air exchange. Speaking comfortably in full sentences. BACK: No midline spinal tenderness or step-off. ABDOMEN: Soft. Non-distended. Non-tender. No guarding or rebound. Normal bowel sounds. EXTREMITIES: No peripheral edema. Moves all extremities equally. All extremities neurovascularly intact. SKIN: Warm and dry. No acute rashes. NEUROLOGICAL: Alert and oriented. No gross facial drooping. Strength 5/5, sensation intact. Normal coordination. Gait normal.   PSYCHIATRIC: Normal mood and affect. LABS  I have reviewed all labs for this visit.    Results for orders placed or performed during the hospital encounter of 05/31/19   CBC auto differential   Result Value Ref Range    WBC 10.9 4.0 - 11.0 K/uL    RBC 4.93 4.00 - 5.20 M/uL    Hemoglobin 15.1 12.0 - 16.0 g/dL    Hematocrit 42.9 36.0 - 48.0 %    MCV 87.0 80.0 - 100.0 fL    MCH 30.7 26.0 - 34.0 pg    MCHC 35.3 31.0 - 36.0 g/dL    RDW 13.2 12.4 - 15.4 %    Platelets 658 597 - 286 K/uL    MPV 7.6 5.0 - 10.5 fL    Neutrophils % 57.0 %    Lymphocytes % 34.4 %    Monocytes % 5.7 %    Eosinophils % 1.8 %    Basophils % 1.1 %    Neutrophils # 6.2 1.7 - 7.7 K/uL    Lymphocytes # 3.7 1.0 - 5.1 K/uL    Monocytes # 0.6 0.0 - 1.3 K/uL    Eosinophils # 0.2 0.0 - 0.6 K/uL    Basophils # 0.1 0.0 - 0.2 K/uL   Comprehensive metabolic panel   Result Value Ref Range    Sodium 140 136 - 145 mmol/L    Potassium 3.5 3.5 - 5.1 mmol/L    Chloride 102 99 - 110 mmol/L    CO2 27 21 - 32 mmol/L    Anion Gap 11 3 - 16    Glucose 157 (H) 70 - 99 mg/dL    BUN 18 7 - 20 mg/dL    CREATININE 0.7 0.6 - 1.1 mg/dL    GFR Non-African American >60 >60    GFR African American >60 >60    Calcium 9.3 8.3 - 10.6 mg/dL    Total Protein 7.3 6.4 - 8.2 g/dL    Alb 4.3 3.4 - 5.0 g/dL    Albumin/Globulin Ratio pain, Disp-12 tablet, R-0Print      ondansetron (ZOFRAN ODT) 4 MG disintegrating tablet Place 1 tablet under the tongue every 8 hours as needed for Nausea or Vomiting, Disp-8 tablet, R-0Print             CLINICAL IMPRESSION  1. Kidney stone    2. Right flank pain        Blood pressure 129/70, pulse 89, temperature 98.5 °F (36.9 °C), temperature source Oral, resp. rate 15, height 4' 11\" (1.499 m), weight 155 lb (70.3 kg), last menstrual period 10/31/2016, SpO2 99 %, not currently breastfeeding. Tamy Perez was discharged to home in stable condition. This chart was generated in part by using Dragon Dictation system and may contain errors related to that system including errors in grammar, punctuation, and spelling, as well as words and phrases that may be inappropriate. When dictating, effort is made to correct spelling/grammar errors. If there are any questions or concerns please feel free to contact the dictating provider for clarification.      Kely Henriquez DO  EMERGENCY MEDICINE        Stacy Balderas DO  06/06/19 6270

## 2019-07-05 ENCOUNTER — APPOINTMENT (OUTPATIENT)
Dept: GENERAL RADIOLOGY | Age: 39
End: 2019-07-05
Payer: MEDICAID

## 2019-07-05 ENCOUNTER — HOSPITAL ENCOUNTER (EMERGENCY)
Age: 39
Discharge: HOME | End: 2019-07-05
Attending: EMERGENCY MEDICINE
Payer: MEDICAID

## 2019-07-05 VITALS
DIASTOLIC BLOOD PRESSURE: 78 MMHG | WEIGHT: 144 LBS | BODY MASS INDEX: 29.03 KG/M2 | SYSTOLIC BLOOD PRESSURE: 116 MMHG | OXYGEN SATURATION: 100 % | TEMPERATURE: 99.4 F | HEIGHT: 59 IN | RESPIRATION RATE: 16 BRPM | HEART RATE: 93 BPM

## 2019-07-05 DIAGNOSIS — F41.1 ANXIETY STATE: Primary | ICD-10-CM

## 2019-07-05 DIAGNOSIS — R07.9 CHEST PAIN, UNSPECIFIED TYPE: ICD-10-CM

## 2019-07-05 DIAGNOSIS — R11.2 NAUSEA AND VOMITING, INTRACTABILITY OF VOMITING NOT SPECIFIED, UNSPECIFIED VOMITING TYPE: ICD-10-CM

## 2019-07-05 DIAGNOSIS — R55 SYNCOPE AND COLLAPSE: ICD-10-CM

## 2019-07-05 LAB
A/G RATIO: 1.5 (ref 1.1–2.2)
ALBUMIN SERPL-MCNC: 4.5 G/DL (ref 3.4–5)
ALP BLD-CCNC: 98 U/L (ref 40–129)
ALT SERPL-CCNC: 19 U/L (ref 10–40)
ANION GAP SERPL CALCULATED.3IONS-SCNC: 16 MMOL/L (ref 3–16)
AST SERPL-CCNC: 15 U/L (ref 15–37)
BASOPHILS ABSOLUTE: 0.1 K/UL (ref 0–0.2)
BASOPHILS RELATIVE PERCENT: 1.1 %
BILIRUB SERPL-MCNC: 0.6 MG/DL (ref 0–1)
BILIRUBIN URINE: NEGATIVE
BLOOD, URINE: NEGATIVE
BUN BLDV-MCNC: 9 MG/DL (ref 7–20)
CALCIUM SERPL-MCNC: 9.7 MG/DL (ref 8.3–10.6)
CHLORIDE BLD-SCNC: 101 MMOL/L (ref 99–110)
CLARITY: CLEAR
CO2: 21 MMOL/L (ref 21–32)
COLOR: YELLOW
CREAT SERPL-MCNC: 0.7 MG/DL (ref 0.6–1.1)
EKG ATRIAL RATE: 98 BPM
EKG DIAGNOSIS: NORMAL
EKG P AXIS: 52 DEGREES
EKG P-R INTERVAL: 112 MS
EKG Q-T INTERVAL: 364 MS
EKG QRS DURATION: 74 MS
EKG QTC CALCULATION (BAZETT): 464 MS
EKG R AXIS: 19 DEGREES
EKG T AXIS: 37 DEGREES
EKG VENTRICULAR RATE: 98 BPM
EOSINOPHILS ABSOLUTE: 0 K/UL (ref 0–0.6)
EOSINOPHILS RELATIVE PERCENT: 0.4 %
GFR AFRICAN AMERICAN: >60
GFR NON-AFRICAN AMERICAN: >60
GLOBULIN: 3.1 G/DL
GLUCOSE BLD-MCNC: 133 MG/DL (ref 70–99)
GLUCOSE URINE: NEGATIVE MG/DL
HCG(URINE) PREGNANCY TEST: NEGATIVE
HCT VFR BLD CALC: 44.8 % (ref 36–48)
HEMOGLOBIN: 15.5 G/DL (ref 12–16)
KETONES, URINE: NEGATIVE MG/DL
LEUKOCYTE ESTERASE, URINE: NEGATIVE
LYMPHOCYTES ABSOLUTE: 2.8 K/UL (ref 1–5.1)
LYMPHOCYTES RELATIVE PERCENT: 25.5 %
MCH RBC QN AUTO: 30.1 PG (ref 26–34)
MCHC RBC AUTO-ENTMCNC: 34.7 G/DL (ref 31–36)
MCV RBC AUTO: 86.9 FL (ref 80–100)
MICROSCOPIC EXAMINATION: NORMAL
MONOCYTES ABSOLUTE: 0.5 K/UL (ref 0–1.3)
MONOCYTES RELATIVE PERCENT: 4.6 %
NEUTROPHILS ABSOLUTE: 7.5 K/UL (ref 1.7–7.7)
NEUTROPHILS RELATIVE PERCENT: 68.4 %
NITRITE, URINE: NEGATIVE
PDW BLD-RTO: 12.8 % (ref 12.4–15.4)
PH UA: 6.5 (ref 5–8)
PLATELET # BLD: 277 K/UL (ref 135–450)
PMV BLD AUTO: 7.9 FL (ref 5–10.5)
POTASSIUM REFLEX MAGNESIUM: 3.6 MMOL/L (ref 3.5–5.1)
PROTEIN UA: NEGATIVE MG/DL
RBC # BLD: 5.15 M/UL (ref 4–5.2)
SODIUM BLD-SCNC: 138 MMOL/L (ref 136–145)
SPECIFIC GRAVITY UA: 1.01 (ref 1–1.03)
TOTAL PROTEIN: 7.6 G/DL (ref 6.4–8.2)
TROPONIN: <0.01 NG/ML
URINE REFLEX TO CULTURE: NORMAL
URINE TYPE: NORMAL
UROBILINOGEN, URINE: 0.2 E.U./DL
WBC # BLD: 10.9 K/UL (ref 4–11)

## 2019-07-05 PROCEDURE — 6360000002 HC RX W HCPCS: Performed by: PHYSICIAN ASSISTANT

## 2019-07-05 PROCEDURE — 84484 ASSAY OF TROPONIN QUANT: CPT

## 2019-07-05 PROCEDURE — 71046 X-RAY EXAM CHEST 2 VIEWS: CPT

## 2019-07-05 PROCEDURE — 85025 COMPLETE CBC W/AUTO DIFF WBC: CPT

## 2019-07-05 PROCEDURE — 93005 ELECTROCARDIOGRAM TRACING: CPT | Performed by: PHYSICIAN ASSISTANT

## 2019-07-05 PROCEDURE — 93010 ELECTROCARDIOGRAM REPORT: CPT | Performed by: INTERNAL MEDICINE

## 2019-07-05 PROCEDURE — 96361 HYDRATE IV INFUSION ADD-ON: CPT

## 2019-07-05 PROCEDURE — 6370000000 HC RX 637 (ALT 250 FOR IP): Performed by: PHYSICIAN ASSISTANT

## 2019-07-05 PROCEDURE — 96360 HYDRATION IV INFUSION INIT: CPT

## 2019-07-05 PROCEDURE — 81003 URINALYSIS AUTO W/O SCOPE: CPT

## 2019-07-05 PROCEDURE — 80053 COMPREHEN METABOLIC PANEL: CPT

## 2019-07-05 PROCEDURE — 99284 EMERGENCY DEPT VISIT MOD MDM: CPT

## 2019-07-05 PROCEDURE — 84703 CHORIONIC GONADOTROPIN ASSAY: CPT

## 2019-07-05 PROCEDURE — 2580000003 HC RX 258: Performed by: PHYSICIAN ASSISTANT

## 2019-07-05 RX ORDER — IBUPROFEN 400 MG/1
800 TABLET ORAL ONCE
Status: COMPLETED | OUTPATIENT
Start: 2019-07-05 | End: 2019-07-05

## 2019-07-05 RX ORDER — IBUPROFEN 800 MG/1
800 TABLET ORAL EVERY 8 HOURS PRN
Qty: 30 TABLET | Refills: 0 | Status: SHIPPED | OUTPATIENT
Start: 2019-07-05 | End: 2019-08-09

## 2019-07-05 RX ORDER — ACETAMINOPHEN 325 MG/1
650 TABLET ORAL ONCE
Status: COMPLETED | OUTPATIENT
Start: 2019-07-05 | End: 2019-07-05

## 2019-07-05 RX ORDER — METOCLOPRAMIDE HYDROCHLORIDE 5 MG/ML
10 INJECTION INTRAMUSCULAR; INTRAVENOUS ONCE
Status: DISCONTINUED | OUTPATIENT
Start: 2019-07-05 | End: 2019-07-05 | Stop reason: HOSPADM

## 2019-07-05 RX ORDER — DIPHENHYDRAMINE HYDROCHLORIDE 50 MG/ML
50 INJECTION INTRAMUSCULAR; INTRAVENOUS ONCE
Status: DISCONTINUED | OUTPATIENT
Start: 2019-07-05 | End: 2019-07-05 | Stop reason: HOSPADM

## 2019-07-05 RX ORDER — 0.9 % SODIUM CHLORIDE 0.9 %
1000 INTRAVENOUS SOLUTION INTRAVENOUS ONCE
Status: COMPLETED | OUTPATIENT
Start: 2019-07-05 | End: 2019-07-05

## 2019-07-05 RX ORDER — LORAZEPAM 1 MG/1
1 TABLET ORAL ONCE
Status: COMPLETED | OUTPATIENT
Start: 2019-07-05 | End: 2019-07-05

## 2019-07-05 RX ORDER — HYDROXYZINE HYDROCHLORIDE 25 MG/1
25 TABLET, FILM COATED ORAL EVERY 6 HOURS PRN
Qty: 20 TABLET | Refills: 0 | Status: SHIPPED | OUTPATIENT
Start: 2019-07-05 | End: 2019-07-15

## 2019-07-05 RX ORDER — LORAZEPAM 1 MG/1
1 TABLET ORAL EVERY 8 HOURS PRN
Qty: 3 TABLET | Refills: 0 | Status: SHIPPED | OUTPATIENT
Start: 2019-07-05 | End: 2019-08-04

## 2019-07-05 RX ADMIN — IBUPROFEN 800 MG: 400 TABLET ORAL at 13:40

## 2019-07-05 RX ADMIN — ACETAMINOPHEN 650 MG: 325 TABLET ORAL at 13:40

## 2019-07-05 RX ADMIN — LORAZEPAM 1 MG: 1 TABLET ORAL at 11:29

## 2019-07-05 RX ADMIN — SODIUM CHLORIDE 1000 ML: 9 INJECTION, SOLUTION INTRAVENOUS at 11:41

## 2019-07-05 ASSESSMENT — PAIN SCALES - GENERAL
PAINLEVEL_OUTOF10: 6
PAINLEVEL_OUTOF10: 6
PAINLEVEL_OUTOF10: 7
PAINLEVEL_OUTOF10: 6
PAINLEVEL_OUTOF10: 6
PAINLEVEL_OUTOF10: 7
PAINLEVEL_OUTOF10: 7

## 2019-07-05 ASSESSMENT — PAIN DESCRIPTION - PROGRESSION
CLINICAL_PROGRESSION: GRADUALLY IMPROVING
CLINICAL_PROGRESSION: NOT CHANGED

## 2019-07-05 ASSESSMENT — PAIN DESCRIPTION - PAIN TYPE
TYPE: ACUTE PAIN
TYPE: ACUTE PAIN

## 2019-07-05 ASSESSMENT — ENCOUNTER SYMPTOMS
PHOTOPHOBIA: 0
DIARRHEA: 1
NAUSEA: 1
SHORTNESS OF BREATH: 1
BACK PAIN: 1
CHOKING: 0
ABDOMINAL PAIN: 0
CHEST TIGHTNESS: 1
VOMITING: 0
COLOR CHANGE: 0
TROUBLE SWALLOWING: 0
COUGH: 0

## 2019-07-05 ASSESSMENT — PAIN DESCRIPTION - LOCATION
LOCATION: CHEST
LOCATION: BACK;CHEST
LOCATION: BACK
LOCATION: ARM;CHEST

## 2019-07-05 ASSESSMENT — PAIN DESCRIPTION - ONSET: ONSET: ON-GOING

## 2019-07-05 ASSESSMENT — PAIN DESCRIPTION - FREQUENCY
FREQUENCY: INTERMITTENT
FREQUENCY: CONTINUOUS

## 2019-07-05 ASSESSMENT — PAIN DESCRIPTION - DESCRIPTORS
DESCRIPTORS: ACHING
DESCRIPTORS: SHARP

## 2019-07-05 ASSESSMENT — PAIN - FUNCTIONAL ASSESSMENT: PAIN_FUNCTIONAL_ASSESSMENT: ACTIVITIES ARE NOT PREVENTED

## 2019-07-05 ASSESSMENT — HEART SCORE: ECG: 0

## 2019-07-05 NOTE — ED NOTES
Refused Benadryl and Reglan.  States \"I don't have a headache and that medicine makes me more anxious\"     Storm File, RN  07/05/19 1200

## 2019-07-22 ENCOUNTER — APPOINTMENT (OUTPATIENT)
Dept: CT IMAGING | Age: 39
End: 2019-07-22
Payer: MEDICAID

## 2019-07-22 ENCOUNTER — APPOINTMENT (OUTPATIENT)
Dept: GENERAL RADIOLOGY | Age: 39
End: 2019-07-22
Payer: MEDICAID

## 2019-07-22 ENCOUNTER — HOSPITAL ENCOUNTER (EMERGENCY)
Age: 39
Discharge: HOME | End: 2019-07-22
Attending: EMERGENCY MEDICINE
Payer: MEDICAID

## 2019-07-22 VITALS
RESPIRATION RATE: 16 BRPM | BODY MASS INDEX: 29.78 KG/M2 | HEIGHT: 59 IN | TEMPERATURE: 99 F | OXYGEN SATURATION: 100 % | SYSTOLIC BLOOD PRESSURE: 132 MMHG | HEART RATE: 92 BPM | DIASTOLIC BLOOD PRESSURE: 94 MMHG | WEIGHT: 147.71 LBS

## 2019-07-22 DIAGNOSIS — R55 SYNCOPE AND COLLAPSE: Primary | ICD-10-CM

## 2019-07-22 LAB
ANION GAP SERPL CALCULATED.3IONS-SCNC: 15 MMOL/L (ref 3–16)
BASOPHILS ABSOLUTE: 0 K/UL (ref 0–0.2)
BASOPHILS RELATIVE PERCENT: 0.4 %
BILIRUBIN URINE: NEGATIVE
BLOOD, URINE: NEGATIVE
BUN BLDV-MCNC: 12 MG/DL (ref 7–20)
CALCIUM SERPL-MCNC: 10 MG/DL (ref 8.3–10.6)
CHLORIDE BLD-SCNC: 102 MMOL/L (ref 99–110)
CLARITY: CLEAR
CO2: 25 MMOL/L (ref 21–32)
COLOR: YELLOW
CREAT SERPL-MCNC: 0.6 MG/DL (ref 0.6–1.1)
EOSINOPHILS ABSOLUTE: 0.1 K/UL (ref 0–0.6)
EOSINOPHILS RELATIVE PERCENT: 1.3 %
GFR AFRICAN AMERICAN: >60
GFR NON-AFRICAN AMERICAN: >60
GLUCOSE BLD-MCNC: 111 MG/DL (ref 70–99)
GLUCOSE URINE: NEGATIVE MG/DL
HCG QUALITATIVE: NEGATIVE
HCG(URINE) PREGNANCY TEST: NEGATIVE
HCT VFR BLD CALC: 46.8 % (ref 36–48)
HEMOGLOBIN: 15.7 G/DL (ref 12–16)
KETONES, URINE: NEGATIVE MG/DL
LACTIC ACID: 1.4 MMOL/L (ref 0.4–2)
LEUKOCYTE ESTERASE, URINE: NEGATIVE
LYMPHOCYTES ABSOLUTE: 3.8 K/UL (ref 1–5.1)
LYMPHOCYTES RELATIVE PERCENT: 33.7 %
MCH RBC QN AUTO: 29.1 PG (ref 26–34)
MCHC RBC AUTO-ENTMCNC: 33.6 G/DL (ref 31–36)
MCV RBC AUTO: 86.6 FL (ref 80–100)
MICROSCOPIC EXAMINATION: NORMAL
MONOCYTES ABSOLUTE: 0.5 K/UL (ref 0–1.3)
MONOCYTES RELATIVE PERCENT: 4.4 %
NEUTROPHILS ABSOLUTE: 6.8 K/UL (ref 1.7–7.7)
NEUTROPHILS RELATIVE PERCENT: 60.2 %
NITRITE, URINE: NEGATIVE
PDW BLD-RTO: 13.5 % (ref 12.4–15.4)
PH UA: 8 (ref 5–8)
PLATELET # BLD: 279 K/UL (ref 135–450)
PMV BLD AUTO: 8.3 FL (ref 5–10.5)
POTASSIUM SERPL-SCNC: 4.1 MMOL/L (ref 3.5–5.1)
PRO-BNP: 167 PG/ML (ref 0–124)
PROTEIN UA: NEGATIVE MG/DL
RBC # BLD: 5.4 M/UL (ref 4–5.2)
SODIUM BLD-SCNC: 142 MMOL/L (ref 136–145)
SPECIFIC GRAVITY UA: 1.01 (ref 1–1.03)
TROPONIN: <0.01 NG/ML
URINE REFLEX TO CULTURE: NORMAL
URINE TYPE: NORMAL
UROBILINOGEN, URINE: 0.2 E.U./DL
WBC # BLD: 11.3 K/UL (ref 4–11)

## 2019-07-22 PROCEDURE — 2580000003 HC RX 258: Performed by: EMERGENCY MEDICINE

## 2019-07-22 PROCEDURE — 84703 CHORIONIC GONADOTROPIN ASSAY: CPT

## 2019-07-22 PROCEDURE — 96361 HYDRATE IV INFUSION ADD-ON: CPT

## 2019-07-22 PROCEDURE — 81003 URINALYSIS AUTO W/O SCOPE: CPT

## 2019-07-22 PROCEDURE — 93005 ELECTROCARDIOGRAM TRACING: CPT | Performed by: EMERGENCY MEDICINE

## 2019-07-22 PROCEDURE — 70450 CT HEAD/BRAIN W/O DYE: CPT

## 2019-07-22 PROCEDURE — 36415 COLL VENOUS BLD VENIPUNCTURE: CPT

## 2019-07-22 PROCEDURE — 84484 ASSAY OF TROPONIN QUANT: CPT

## 2019-07-22 PROCEDURE — 99285 EMERGENCY DEPT VISIT HI MDM: CPT

## 2019-07-22 PROCEDURE — 85025 COMPLETE CBC W/AUTO DIFF WBC: CPT

## 2019-07-22 PROCEDURE — 71045 X-RAY EXAM CHEST 1 VIEW: CPT

## 2019-07-22 PROCEDURE — 6370000000 HC RX 637 (ALT 250 FOR IP): Performed by: EMERGENCY MEDICINE

## 2019-07-22 PROCEDURE — 83605 ASSAY OF LACTIC ACID: CPT

## 2019-07-22 PROCEDURE — 72125 CT NECK SPINE W/O DYE: CPT

## 2019-07-22 PROCEDURE — 83880 ASSAY OF NATRIURETIC PEPTIDE: CPT

## 2019-07-22 PROCEDURE — 80048 BASIC METABOLIC PNL TOTAL CA: CPT

## 2019-07-22 PROCEDURE — 96374 THER/PROPH/DIAG INJ IV PUSH: CPT

## 2019-07-22 PROCEDURE — 6360000002 HC RX W HCPCS: Performed by: EMERGENCY MEDICINE

## 2019-07-22 RX ORDER — ACETAMINOPHEN 325 MG/1
650 TABLET ORAL ONCE
Status: COMPLETED | OUTPATIENT
Start: 2019-07-22 | End: 2019-07-22

## 2019-07-22 RX ORDER — OXYCODONE HYDROCHLORIDE AND ACETAMINOPHEN 5; 325 MG/1; MG/1
1 TABLET ORAL ONCE
Status: COMPLETED | OUTPATIENT
Start: 2019-07-22 | End: 2019-07-22

## 2019-07-22 RX ORDER — 0.9 % SODIUM CHLORIDE 0.9 %
1000 INTRAVENOUS SOLUTION INTRAVENOUS ONCE
Status: COMPLETED | OUTPATIENT
Start: 2019-07-22 | End: 2019-07-22

## 2019-07-22 RX ORDER — MORPHINE SULFATE 4 MG/ML
4 INJECTION, SOLUTION INTRAMUSCULAR; INTRAVENOUS ONCE
Status: COMPLETED | OUTPATIENT
Start: 2019-07-22 | End: 2019-07-22

## 2019-07-22 RX ADMIN — ACETAMINOPHEN 650 MG: 325 TABLET ORAL at 16:55

## 2019-07-22 RX ADMIN — OXYCODONE HYDROCHLORIDE AND ACETAMINOPHEN 1 TABLET: 5; 325 TABLET ORAL at 19:01

## 2019-07-22 RX ADMIN — MORPHINE SULFATE 4 MG: 4 INJECTION, SOLUTION INTRAMUSCULAR; INTRAVENOUS at 16:55

## 2019-07-22 RX ADMIN — SODIUM CHLORIDE 1000 ML: 9 INJECTION, SOLUTION INTRAVENOUS at 16:55

## 2019-07-22 ASSESSMENT — ENCOUNTER SYMPTOMS
ABDOMINAL DISTENTION: 0
CHEST TIGHTNESS: 0
COUGH: 0
APNEA: 0
EYE DISCHARGE: 0
SHORTNESS OF BREATH: 0
EYE ITCHING: 0
CHOKING: 0
PHOTOPHOBIA: 0
EYE REDNESS: 0
EYE PAIN: 0
WHEEZING: 0
STRIDOR: 0

## 2019-07-22 ASSESSMENT — PAIN DESCRIPTION - PAIN TYPE
TYPE: ACUTE PAIN

## 2019-07-22 ASSESSMENT — PAIN DESCRIPTION - LOCATION
LOCATION: NECK;HEAD
LOCATION: NECK
LOCATION: NECK

## 2019-07-22 ASSESSMENT — PAIN DESCRIPTION - ORIENTATION
ORIENTATION: LEFT
ORIENTATION: LEFT

## 2019-07-22 ASSESSMENT — PAIN DESCRIPTION - PROGRESSION
CLINICAL_PROGRESSION: GRADUALLY IMPROVING
CLINICAL_PROGRESSION: GRADUALLY WORSENING
CLINICAL_PROGRESSION: NOT CHANGED

## 2019-07-22 ASSESSMENT — PAIN DESCRIPTION - DESCRIPTORS
DESCRIPTORS: ACHING;THROBBING
DESCRIPTORS: TENDER
DESCRIPTORS: THROBBING

## 2019-07-22 ASSESSMENT — PAIN DESCRIPTION - FREQUENCY
FREQUENCY: CONTINUOUS

## 2019-07-22 ASSESSMENT — PAIN - FUNCTIONAL ASSESSMENT: PAIN_FUNCTIONAL_ASSESSMENT: 0-10

## 2019-07-22 ASSESSMENT — PAIN SCALES - GENERAL
PAINLEVEL_OUTOF10: 8
PAINLEVEL_OUTOF10: 7
PAINLEVEL_OUTOF10: 2
PAINLEVEL_OUTOF10: 7
PAINLEVEL_OUTOF10: 8

## 2019-07-22 ASSESSMENT — PAIN DESCRIPTION - ONSET
ONSET: GRADUAL

## 2019-07-22 NOTE — ED PROVIDER NOTES
and headaches. Negative for tremors, seizures, facial asymmetry, speech difficulty, weakness and numbness. Hematological: Negative for adenopathy. Does not bruise/bleed easily. Psychiatric/Behavioral: Negative for agitation, behavioral problems, confusion, decreased concentration, dysphoric mood, hallucinations, self-injury, sleep disturbance and suicidal ideas. The patient is not nervous/anxious and is not hyperactive. Except as noted above the remainder of the review of systems was reviewed and negative. PAST MEDICAL HISTORY     Past Medical History:   Diagnosis Date    ADHD (attention deficit hyperactivity disorder) 82    Anesthesia complication     pt states with general anesthesia, she gets very nauseated and wakes up with a migrain     Anxiety     Difficult intubation     Functional ovarian cysts 2008    rt ovary cyst x 2 yrs.     Headache(784.0)     migraines    Hiatal hernia     History of blood transfusion     History of kidney stones     History of PCOS     Hydrocephalus     Irritable bowel syndrome     had colonoscopy about 6 yrs ago    Meningitis     PONV (postoperative nausea and vomiting)     only with general anesthesia    Primary osteoarthritis of left knee 2016    S/P cone biopsy of cervix     Scoliosis .s    Tobacco abuse 2018     (ventriculoperitoneal) shunt status     Wears glasses     reading       SURGICAL HISTORY       Past Surgical History:   Procedure Laterality Date    APPENDECTOMY  2003    appendicitis     SECTION      placenta previa    CHOLECYSTECTOMY      COLONOSCOPY  2004    COLPOSCOPY      CSF SHUNT      replaced at age 17   [facility] CYSTOSCOPY      stone removal   [address] [facility]    inguinal    HYSTERECTOMY, TOTAL ABDOMINAL  2016    TAHBSO, adhesions/PCOS    KNEE ARTHROSCOPY Left 2015    medial meniscectomy, chondroplasty, plica resection    OTHER SURGICAL HISTORY  10/19/2016    op lap    atrophy. No cranial nerve deficit. She exhibits normal muscle tone. Coordination normal.   No signs of abrasions to head, Shunt palpated on left side of temporal region of head. No focal neuro deficit noted, normal gait, normal cerebellar function based off heel to shin and finger to nose. Skin: Skin is warm. No rash noted. She is not diaphoretic. No erythema. Psychiatric: She has a normal mood and affect.  Her behavior is normal. Thought content normal.     DIAGNOSTIC RESULTS     EKG: All EKG's are interpreted by the Emergency Department Physician who either signs or Co-signs this chart in the absence of acardiologist.    EKG shows sinus tachycardia NAD no ectopy no acute st changes     RADIOLOGY:   Non-plain film images such as CT, Ultrasoundand MRI are read by the radiologist. Plain radiographic images are visualized and preliminarily interpreted by the emergency physician with the below findings:    Shunt series and CT shows reassuring findings     ED BEDSIDE ULTRASOUND:   Performed by ED Physician - none    LABS:  Labs Reviewed   CBC WITH AUTO DIFFERENTIAL - Abnormal; Notable for the following components:       Result Value    WBC 11.3 (*)     RBC 5.40 (*)     All other components within normal limits    Narrative:     Performed at:  [facility]  [address] [place], [facility]   Phone [phone]   BASIC METABOLIC PANEL - Abnormal; Notable for the following components:    Glucose 111 (*)     All other components within normal limits    Narrative:     Performed at:  [facility]  [address] [place], [facility]   Phone [phone]   BRAIN NATRIURETIC PEPTIDE - Abnormal; Notable for the following components:    Pro- (*)     All other components within normal limits    Narrative:     Performed at:  [address] [facility]  [address], [facility] Phone [phone]   URINE RT REFLEX TO CULTURE    Narrative:     Performed at:  [facility]  [address] [place], [place]   Phone [phone]   TROPONIN    Narrative:     Performed at:  [address] [facility]  [address] [place], [place]   Phone [phone]   LACTIC ACID, PLASMA    Narrative:     Performed at:  [address] [facility]  [address], [place]   Phone [phone]   HCG, SERUM, QUALITATIVE    Narrative:     Performed at:  [address] [facility]  [address], [place]   Phone [phone]   PREGNANCY, URINE    Narrative:     Performed at:  [address] [facility]  [address], [place]   Phone [phone]     All other labs were withinnormal range or not returned as of this dictation. EMERGENCY DEPARTMENT COURSE and DIFFERENTIAL DIAGNOSIS/MDM:     Patient was in the heat all day syncope possible 2/2 vasovagal or dehydration or fatigue    Labs and imaging reassuring    Heart score 1    Shunt and CT reassuring, instructed to follow up with NSGY in 7-10 days    Patient asymptomatic upon DC, head pain resolved, no neuro deficits, no dizziness or light-headedness     I discussed with patient the results of evaluation in the ED, diagnosis, care, and prognosis. The plan is to discharge to home. Patient is in agreement with plan and questions have been answered.      I also discussed with patient the reasons which may require a return visit and the importance of follow-up care. The patient is well-appearing, nontoxic, and improved at the time of discharge. Patient agrees to call to arrange follow-up care as directed. Patient understands to return immediately for worsening/change in symptoms.       CRITICAL CARE TIME   Total Critical Caretime was 18 minutes, excluding separately reportable procedures. There was a high probability of clinically significant/life threatening deterioration in the patient's condition which required my urgent intervention. PROCEDURES:  Unlessotherwise noted below, none    FINAL IMPRESSION      1.  Syncope and collapse          DISPOSITION/PLAN   DISPOSITION      PATIENT REFERRED TO:  Rey Urbina  [address] [place] [address] Road  [phone]            (Please note that portions ofthis note were completed with a voice recognition program.  Efforts were made to edit the dictations but occasionally words are mis-transcribed.)    Aristeo Adams MD(electronically signed)  Attending Emergency Physician       Aristeo Adams MD  07/22/19 9117

## 2019-07-24 LAB
EKG ATRIAL RATE: 105 BPM
EKG DIAGNOSIS: NORMAL
EKG P AXIS: 42 DEGREES
EKG P-R INTERVAL: 116 MS
EKG Q-T INTERVAL: 356 MS
EKG QRS DURATION: 70 MS
EKG QTC CALCULATION (BAZETT): 470 MS
EKG R AXIS: 9 DEGREES
EKG T AXIS: 42 DEGREES
EKG VENTRICULAR RATE: 105 BPM

## 2019-07-24 PROCEDURE — 93010 ELECTROCARDIOGRAM REPORT: CPT | Performed by: INTERNAL MEDICINE

## 2019-08-09 ENCOUNTER — HOSPITAL ENCOUNTER (EMERGENCY)
Age: 39
Discharge: ANOTHER HOSPITAL | End: 2019-08-10
Attending: EMERGENCY MEDICINE
Payer: MEDICAID

## 2019-08-09 DIAGNOSIS — M54.2 NECK PAIN: Primary | ICD-10-CM

## 2019-08-09 DIAGNOSIS — H66.011 NON-RECURRENT ACUTE SUPPURATIVE OTITIS MEDIA OF RIGHT EAR WITH SPONTANEOUS RUPTURE OF TYMPANIC MEMBRANE: ICD-10-CM

## 2019-08-09 PROCEDURE — 99285 EMERGENCY DEPT VISIT HI MDM: CPT

## 2019-08-09 ASSESSMENT — PAIN DESCRIPTION - ORIENTATION: ORIENTATION: LEFT;RIGHT

## 2019-08-09 ASSESSMENT — PAIN DESCRIPTION - DESCRIPTORS: DESCRIPTORS: SHARP

## 2019-08-09 ASSESSMENT — PAIN DESCRIPTION - PAIN TYPE: TYPE: ACUTE PAIN

## 2019-08-09 ASSESSMENT — PAIN DESCRIPTION - LOCATION: LOCATION: EAR

## 2019-08-09 ASSESSMENT — PAIN SCALES - GENERAL: PAINLEVEL_OUTOF10: 8

## 2019-08-10 ENCOUNTER — APPOINTMENT (OUTPATIENT)
Dept: CT IMAGING | Age: 39
End: 2019-08-10
Payer: MEDICAID

## 2019-08-10 VITALS
DIASTOLIC BLOOD PRESSURE: 93 MMHG | RESPIRATION RATE: 18 BRPM | WEIGHT: 149.69 LBS | TEMPERATURE: 98.7 F | BODY MASS INDEX: 30.23 KG/M2 | HEART RATE: 100 BPM | OXYGEN SATURATION: 98 % | SYSTOLIC BLOOD PRESSURE: 128 MMHG

## 2019-08-10 LAB
ANION GAP SERPL CALCULATED.3IONS-SCNC: 15 MMOL/L (ref 3–16)
APTT: 35.7 SEC (ref 26–36)
BASOPHILS ABSOLUTE: 0.1 K/UL (ref 0–0.2)
BASOPHILS RELATIVE PERCENT: 0.5 %
BUN BLDV-MCNC: 18 MG/DL (ref 7–20)
CALCIUM SERPL-MCNC: 9.8 MG/DL (ref 8.3–10.6)
CHLORIDE BLD-SCNC: 103 MMOL/L (ref 99–110)
CO2: 22 MMOL/L (ref 21–32)
CREAT SERPL-MCNC: 0.7 MG/DL (ref 0.6–1.1)
EOSINOPHILS ABSOLUTE: 0.1 K/UL (ref 0–0.6)
EOSINOPHILS RELATIVE PERCENT: 0.7 %
GFR AFRICAN AMERICAN: >60
GFR NON-AFRICAN AMERICAN: >60
GLUCOSE BLD-MCNC: 129 MG/DL (ref 70–99)
HCT VFR BLD CALC: 45.9 % (ref 36–48)
HEMOGLOBIN: 15.7 G/DL (ref 12–16)
INR BLD: 1.03 (ref 0.86–1.14)
LACTIC ACID: 0.9 MMOL/L (ref 0.4–2)
LYMPHOCYTES ABSOLUTE: 2.8 K/UL (ref 1–5.1)
LYMPHOCYTES RELATIVE PERCENT: 19.4 %
MCH RBC QN AUTO: 29.6 PG (ref 26–34)
MCHC RBC AUTO-ENTMCNC: 34.1 G/DL (ref 31–36)
MCV RBC AUTO: 86.8 FL (ref 80–100)
MONOCYTES ABSOLUTE: 0.2 K/UL (ref 0–1.3)
MONOCYTES RELATIVE PERCENT: 1.7 %
NEUTROPHILS ABSOLUTE: 11.1 K/UL (ref 1.7–7.7)
NEUTROPHILS RELATIVE PERCENT: 77.7 %
PDW BLD-RTO: 13.5 % (ref 12.4–15.4)
PLATELET # BLD: 268 K/UL (ref 135–450)
PMV BLD AUTO: 7.8 FL (ref 5–10.5)
POTASSIUM REFLEX MAGNESIUM: 3.7 MMOL/L (ref 3.5–5.1)
PROTHROMBIN TIME: 11.7 SEC (ref 9.8–13)
RBC # BLD: 5.28 M/UL (ref 4–5.2)
SODIUM BLD-SCNC: 140 MMOL/L (ref 136–145)
WBC # BLD: 14.2 K/UL (ref 4–11)

## 2019-08-10 PROCEDURE — 85730 THROMBOPLASTIN TIME PARTIAL: CPT

## 2019-08-10 PROCEDURE — 85025 COMPLETE CBC W/AUTO DIFF WBC: CPT

## 2019-08-10 PROCEDURE — 6370000000 HC RX 637 (ALT 250 FOR IP): Performed by: EMERGENCY MEDICINE

## 2019-08-10 PROCEDURE — 36415 COLL VENOUS BLD VENIPUNCTURE: CPT

## 2019-08-10 PROCEDURE — 80048 BASIC METABOLIC PNL TOTAL CA: CPT

## 2019-08-10 PROCEDURE — 70450 CT HEAD/BRAIN W/O DYE: CPT

## 2019-08-10 PROCEDURE — 85610 PROTHROMBIN TIME: CPT

## 2019-08-10 PROCEDURE — 83605 ASSAY OF LACTIC ACID: CPT

## 2019-08-10 RX ORDER — DIPHENHYDRAMINE HYDROCHLORIDE 50 MG/ML
25 INJECTION INTRAMUSCULAR; INTRAVENOUS ONCE
Status: DISCONTINUED | OUTPATIENT
Start: 2019-08-10 | End: 2019-08-10 | Stop reason: HOSPADM

## 2019-08-10 RX ORDER — METOCLOPRAMIDE HYDROCHLORIDE 5 MG/ML
10 INJECTION INTRAMUSCULAR; INTRAVENOUS ONCE
Status: DISCONTINUED | OUTPATIENT
Start: 2019-08-10 | End: 2019-08-10 | Stop reason: HOSPADM

## 2019-08-10 RX ORDER — ONDANSETRON 4 MG/1
4 TABLET, ORALLY DISINTEGRATING ORAL ONCE
Status: COMPLETED | OUTPATIENT
Start: 2019-08-10 | End: 2019-08-10

## 2019-08-10 RX ADMIN — ONDANSETRON 4 MG: 4 TABLET, ORALLY DISINTEGRATING ORAL at 00:35

## 2019-08-10 NOTE — ED PROVIDER NOTES
MRI are read by the radiologist. Natasha Fredrick images are visualized and preliminarily interpreted by the  ED Provider with the belowfindings:        Interpretation per the Radiologist below, if available at the time of this note:    CT HEAD WO CONTRAST   Final Result   No acute intracranial abnormality. Stable left frontal approach ventriculostomy positioning. Stable size and   configuration of the ventricles. No middle ear or mastoid effusion. Patent external auditory canals. Unremarkable periauricular soft tissues. PROCEDURES   Unless otherwise noted below, none     Procedures    CRITICAL CARE TIME   N/A    CONSULTS:  None    EMERGENCY DEPARTMENT COURSE and DIFFERENTIAL DIAGNOSIS/MDM:   Vitals:    Vitals:    08/09/19 2329   BP: (!) 128/93   Pulse: 100   Resp: 18   Temp: 98.7 °F (37.1 °C)   Weight: 149 lb 11.1 oz (67.9 kg)       Patient was given the following medications:  Medications   metoclopramide (REGLAN) injection 10 mg (10 mg Intravenous Not Given 8/10/19 0125)   diphenhydrAMINE (BENADRYL) injection 25 mg (25 mg Intravenous Not Given 8/10/19 0125)   ondansetron (ZOFRAN-ODT) disintegrating tablet 4 mg (4 mg Oral Given 8/10/19 0035)       Patient presents to ED with ear pain with drainage earlier today patient also complaining of headache and neck pain patient does have a  shunt on that side. Will get CBC BMP check for leukocytosis elected of normality lactate for lactic acidosis CT scan will reassess  Laboratory work-up notable for slight leukocytosis, patient CT scan unremarkable, given patient's continued symptoms of fatigue neck pain shunt pain some concern for  shunt infection versus ear infection versus meningitis. Patient refused LP saying that she just wanted to go to [facility] where her neurosurgeon was. Discussed with neurosurgery who accepted patient patient will be seen ED UC will go for private transport. The patient tolerated their visit well.    Thepatient and / or the family were informed of the results of any tests, a time was given to answer questions. FINAL IMPRESSION      1. Neck pain    2.  Non-recurrent acute suppurative otitis media of right ear with spontaneous rupture of tympanic membrane        DISPOSITION/PLAN   DISPOSITION Decision To Transfer 08/10/2019 01:32:29 AM      PATIENT REFERRED TO:  Joel Ballard [facility] [address]  [phone]    Schedule an appointment as soon as possible for a visit         DISCHARGE MEDICATIONS:  New Prescriptions    No medications on file       DISCONTINUED MEDICATIONS:  Discontinued Medications    IBUPROFEN (ADVIL;MOTRIN) 800 MG TABLET    Take 1 tablet by mouth every 8 hours as needed for Pain              (Please note that portions of this note were completed with a voice recognition program.  Efforts were made to edit the dictations but occasionally words aremis-transcribed.)    Rodrigo Morgan DO (electronically signed)            Rodrigo Morgan DO  08/10/19 0239

## 2019-08-24 ENCOUNTER — APPOINTMENT (OUTPATIENT)
Dept: CT IMAGING | Age: 39
End: 2019-08-24
Payer: MEDICAID

## 2019-08-24 ENCOUNTER — HOSPITAL ENCOUNTER (EMERGENCY)
Age: 39
Discharge: HOME | End: 2019-08-24
Payer: MEDICAID

## 2019-08-24 VITALS
BODY MASS INDEX: 29.23 KG/M2 | SYSTOLIC BLOOD PRESSURE: 133 MMHG | WEIGHT: 145 LBS | DIASTOLIC BLOOD PRESSURE: 87 MMHG | HEIGHT: 59 IN | TEMPERATURE: 99.4 F | OXYGEN SATURATION: 99 % | HEART RATE: 80 BPM | RESPIRATION RATE: 16 BRPM

## 2019-08-24 DIAGNOSIS — R10.9 RIGHT SIDED ABDOMINAL PAIN: Primary | ICD-10-CM

## 2019-08-24 LAB
BACTERIA: ABNORMAL /HPF
BILIRUBIN URINE: NEGATIVE
BLOOD, URINE: ABNORMAL
CLARITY: ABNORMAL
COLOR: YELLOW
EPITHELIAL CELLS, UA: ABNORMAL /HPF
GLUCOSE URINE: NEGATIVE MG/DL
KETONES, URINE: NEGATIVE MG/DL
LEUKOCYTE ESTERASE, URINE: NEGATIVE
MICROSCOPIC EXAMINATION: YES
NITRITE, URINE: NEGATIVE
PH UA: 6 (ref 5–8)
PROTEIN UA: NEGATIVE MG/DL
RBC UA: ABNORMAL /HPF (ref 0–2)
SPECIFIC GRAVITY UA: 1.02 (ref 1–1.03)
URINE REFLEX TO CULTURE: ABNORMAL
URINE TYPE: ABNORMAL
UROBILINOGEN, URINE: 0.2 E.U./DL
WBC UA: ABNORMAL /HPF (ref 0–5)

## 2019-08-24 PROCEDURE — 6370000000 HC RX 637 (ALT 250 FOR IP): Performed by: PHYSICIAN ASSISTANT

## 2019-08-24 PROCEDURE — 81001 URINALYSIS AUTO W/SCOPE: CPT

## 2019-08-24 PROCEDURE — 74176 CT ABD & PELVIS W/O CONTRAST: CPT

## 2019-08-24 PROCEDURE — 99284 EMERGENCY DEPT VISIT MOD MDM: CPT

## 2019-08-24 RX ORDER — HYDROCODONE BITARTRATE AND ACETAMINOPHEN 5; 325 MG/1; MG/1
1 TABLET ORAL ONCE
Status: COMPLETED | OUTPATIENT
Start: 2019-08-24 | End: 2019-08-24

## 2019-08-24 RX ORDER — ONDANSETRON 4 MG/1
8 TABLET, ORALLY DISINTEGRATING ORAL ONCE
Status: COMPLETED | OUTPATIENT
Start: 2019-08-24 | End: 2019-08-24

## 2019-08-24 RX ADMIN — ONDANSETRON 8 MG: 4 TABLET, ORALLY DISINTEGRATING ORAL at 19:51

## 2019-08-24 RX ADMIN — HYDROCODONE BITARTRATE AND ACETAMINOPHEN 1 TABLET: 5; 325 TABLET ORAL at 19:52

## 2019-08-24 ASSESSMENT — PAIN SCALES - GENERAL
PAINLEVEL_OUTOF10: 8
PAINLEVEL_OUTOF10: 9
PAINLEVEL_OUTOF10: 8

## 2019-08-24 ASSESSMENT — ENCOUNTER SYMPTOMS
NAUSEA: 0
ABDOMINAL PAIN: 1
SORE THROAT: 0
SHORTNESS OF BREATH: 0
VOMITING: 0
FACIAL SWELLING: 0
BACK PAIN: 0
APNEA: 0
EYE REDNESS: 0
EYE DISCHARGE: 0
CHOKING: 0

## 2019-08-24 ASSESSMENT — PAIN DESCRIPTION - LOCATION: LOCATION: FLANK

## 2019-08-24 ASSESSMENT — PAIN DESCRIPTION - ORIENTATION: ORIENTATION: RIGHT

## 2019-08-24 ASSESSMENT — PAIN DESCRIPTION - DESCRIPTORS: DESCRIPTORS: SHARP

## 2019-08-24 ASSESSMENT — PAIN DESCRIPTION - FREQUENCY: FREQUENCY: CONTINUOUS

## 2019-08-24 NOTE — ED PROVIDER NOTES
Normal range of motion. Neck supple. Cardiovascular: Normal rate, regular rhythm and normal heart sounds. Exam reveals no gallop and no friction rub. No murmur heard. Pulmonary/Chest: Effort normal and breath sounds normal. No respiratory distress. She has no wheezes. She has no rales. She exhibits no tenderness. Abdominal: Soft. Bowel sounds are normal. She exhibits no distension and no mass. There is tenderness. There is no rebound and no guarding. Musculoskeletal: Normal range of motion. Neurological: She is alert and oriented to person, place, and time. Skin: Skin is warm and dry. She is not diaphoretic. Psychiatric: She has a normal mood and affect. Her behavior is normal.   Nursing note and vitals reviewed. MEDICAL DECISION MAKING    Vitals:    Vitals:    08/24/19 1758   BP: 133/87   Pulse: 80   Resp: 16   Temp: 99.4 °F (37.4 °C)   TempSrc: Oral   SpO2: 99%   Weight: 145 lb (65.8 kg)   Height: 4' 11\" (1.499 m)       LABS:  Labs Reviewed   URINE RT REFLEX TO CULTURE - Abnormal; Notable for the following components:       Result Value    Clarity, UA SL CLOUDY (*)     Blood, Urine TRACE-LYSED (*)     All other components within normal limits    Narrative:     Performed at:  [facility]  [address] [facility]   Phone [phone]   MICROSCOPIC URINALYSIS - Abnormal; Notable for the following components:    Bacteria, UA 2+ (*)     All other components within normal limits    Narrative:     Performed at:  [facility]  [address] [facility]   Phone [phone] of labs reviewed and werenegative at this time or not returned at the time of this note.     RADIOLOGY:   Non-plain film images such as CT, Ultrasound and MRI are read by the radiologist. Susie Ernst PA-C have directly visualized the radiologic plain film image(s) with the below findings:        Interpretation per the Radiologist below, if available at the time of thisnote:    CT ABDOMEN PELVIS WO CONTRAST   Final Result   1. No CT evidence of an acute intra-abdominal or intrapelvic process. 2. Nonobstructing punctate calculus inferior pole right kidney. No   hydronephrosis or ureter calculus demonstrated. 3.  Diverticulosis coli without CT evidence of acute diverticulitis. 4. Cholecystectomy, appendectomy and hysterectomy. 5. Stable appearing  shunt extending over the left side of the   abdomen/pelvis, tip in the mid pelvis. No results found. MEDICAL DECISION MAKING / ED COURSE:      PROCEDURES:   Procedures    None    Patient was given:  Medications   ondansetron (ZOFRAN-ODT) disintegrating tablet 8 mg (8 mg Oral Given 8/24/19 1951)   HYDROcodone-acetaminophen (NORCO) 5-325 MG per tablet 1 tablet (1 tablet Oral Given 8/24/19 1952)       Emergency room course: Patient on exam cardiovascular regular rhythm, lungs are clear no wheeze rales or rhonchi. No chest wall tenderness. Abdomen shows some mild right-sided tenderness with palpation. No rebound or guarding noted. No flank tenderness with percussion. No suprapubic tenderness. Full range of motion all extremity. Neurologically no motor or sensory deficit noted. Patient is alert and oriented x4. Does not appear to be in acute distress. Urinalysis showed negative leukocytes negative nitrite show trace of blood negative for ketones. Microscopically WBCs 0-2, RBCs 0-2, epithelial cells 5-10.  2+ bacteria noted. CT shows no acute intra-dominant or intrapelvic abnormalities. At this time discussed patient CT results with her. She could have already passed a stone. Follow-up with her urologist.  Take OTC Motrin Tylenol as needed for pain. Drink plenty of fluids. The patient tolerated their visit well. I evaluated the patient.   The physician was available for consultation as

## 2019-09-02 ENCOUNTER — APPOINTMENT (OUTPATIENT)
Dept: CT IMAGING | Age: 39
End: 2019-09-02
Payer: MEDICAID

## 2019-09-02 ENCOUNTER — HOSPITAL ENCOUNTER (EMERGENCY)
Age: 39
Discharge: HOME | End: 2019-09-02
Attending: EMERGENCY MEDICINE
Payer: MEDICAID

## 2019-09-02 VITALS
TEMPERATURE: 97.9 F | HEART RATE: 91 BPM | RESPIRATION RATE: 16 BRPM | BODY MASS INDEX: 30.36 KG/M2 | HEIGHT: 59 IN | WEIGHT: 150.57 LBS | OXYGEN SATURATION: 100 % | DIASTOLIC BLOOD PRESSURE: 82 MMHG | SYSTOLIC BLOOD PRESSURE: 122 MMHG

## 2019-09-02 DIAGNOSIS — R11.2 NON-INTRACTABLE VOMITING WITH NAUSEA, UNSPECIFIED VOMITING TYPE: ICD-10-CM

## 2019-09-02 DIAGNOSIS — S06.0X9A CONCUSSION WITH LOSS OF CONSCIOUSNESS, INITIAL ENCOUNTER: Primary | ICD-10-CM

## 2019-09-02 DIAGNOSIS — G44.319 ACUTE POST-TRAUMATIC HEADACHE, NOT INTRACTABLE: ICD-10-CM

## 2019-09-02 LAB
A/G RATIO: 1.3 (ref 1.1–2.2)
ALBUMIN SERPL-MCNC: 4.4 G/DL (ref 3.4–5)
ALP BLD-CCNC: 86 U/L (ref 40–129)
ALT SERPL-CCNC: 21 U/L (ref 10–40)
ANION GAP SERPL CALCULATED.3IONS-SCNC: 15 MMOL/L (ref 3–16)
APTT: 35.9 SEC (ref 26–36)
AST SERPL-CCNC: 15 U/L (ref 15–37)
BASOPHILS ABSOLUTE: 0.2 K/UL (ref 0–0.2)
BASOPHILS RELATIVE PERCENT: 1.8 %
BILIRUB SERPL-MCNC: 0.3 MG/DL (ref 0–1)
BUN BLDV-MCNC: 10 MG/DL (ref 7–20)
CALCIUM SERPL-MCNC: 9.3 MG/DL (ref 8.3–10.6)
CHLORIDE BLD-SCNC: 102 MMOL/L (ref 99–110)
CO2: 21 MMOL/L (ref 21–32)
CREAT SERPL-MCNC: 0.6 MG/DL (ref 0.6–1.1)
EOSINOPHILS ABSOLUTE: 0.1 K/UL (ref 0–0.6)
EOSINOPHILS RELATIVE PERCENT: 0.9 %
GFR AFRICAN AMERICAN: >60
GFR NON-AFRICAN AMERICAN: >60
GLOBULIN: 3.4 G/DL
GLUCOSE BLD-MCNC: 111 MG/DL (ref 70–99)
HCT VFR BLD CALC: 43 % (ref 36–48)
HEMOGLOBIN: 15.1 G/DL (ref 12–16)
INR BLD: 1.01 (ref 0.86–1.14)
LYMPHOCYTES ABSOLUTE: 3.4 K/UL (ref 1–5.1)
LYMPHOCYTES RELATIVE PERCENT: 37.3 %
MCH RBC QN AUTO: 29.8 PG (ref 26–34)
MCHC RBC AUTO-ENTMCNC: 35.2 G/DL (ref 31–36)
MCV RBC AUTO: 84.7 FL (ref 80–100)
MONOCYTES ABSOLUTE: 0.5 K/UL (ref 0–1.3)
MONOCYTES RELATIVE PERCENT: 5 %
NEUTROPHILS ABSOLUTE: 4.9 K/UL (ref 1.7–7.7)
NEUTROPHILS RELATIVE PERCENT: 55 %
PDW BLD-RTO: 13.8 % (ref 12.4–15.4)
PLATELET # BLD: 281 K/UL (ref 135–450)
PMV BLD AUTO: 7.3 FL (ref 5–10.5)
POTASSIUM SERPL-SCNC: 3.9 MMOL/L (ref 3.5–5.1)
PROTHROMBIN TIME: 11.5 SEC (ref 9.8–13)
RBC # BLD: 5.08 M/UL (ref 4–5.2)
SODIUM BLD-SCNC: 138 MMOL/L (ref 136–145)
TOTAL PROTEIN: 7.8 G/DL (ref 6.4–8.2)
WBC # BLD: 9 K/UL (ref 4–11)

## 2019-09-02 PROCEDURE — 85610 PROTHROMBIN TIME: CPT

## 2019-09-02 PROCEDURE — 80053 COMPREHEN METABOLIC PANEL: CPT

## 2019-09-02 PROCEDURE — 70486 CT MAXILLOFACIAL W/O DYE: CPT

## 2019-09-02 PROCEDURE — 96374 THER/PROPH/DIAG INJ IV PUSH: CPT

## 2019-09-02 PROCEDURE — 96375 TX/PRO/DX INJ NEW DRUG ADDON: CPT

## 2019-09-02 PROCEDURE — 85025 COMPLETE CBC W/AUTO DIFF WBC: CPT

## 2019-09-02 PROCEDURE — 72125 CT NECK SPINE W/O DYE: CPT

## 2019-09-02 PROCEDURE — 85730 THROMBOPLASTIN TIME PARTIAL: CPT

## 2019-09-02 PROCEDURE — 6360000002 HC RX W HCPCS: Performed by: EMERGENCY MEDICINE

## 2019-09-02 PROCEDURE — 99284 EMERGENCY DEPT VISIT MOD MDM: CPT

## 2019-09-02 PROCEDURE — 70450 CT HEAD/BRAIN W/O DYE: CPT

## 2019-09-02 RX ORDER — FENTANYL CITRATE 50 UG/ML
50 INJECTION, SOLUTION INTRAMUSCULAR; INTRAVENOUS
Status: DISCONTINUED | OUTPATIENT
Start: 2019-09-02 | End: 2019-09-02 | Stop reason: HOSPADM

## 2019-09-02 RX ORDER — ONDANSETRON 4 MG/1
4 TABLET, FILM COATED ORAL EVERY 8 HOURS PRN
Qty: 16 TABLET | Refills: 0 | Status: SHIPPED | OUTPATIENT
Start: 2019-09-02 | End: 2019-10-25

## 2019-09-02 RX ORDER — ONDANSETRON 2 MG/ML
4 INJECTION INTRAMUSCULAR; INTRAVENOUS ONCE
Status: COMPLETED | OUTPATIENT
Start: 2019-09-02 | End: 2019-09-02

## 2019-09-02 RX ORDER — LORAZEPAM 2 MG/ML
1 INJECTION INTRAMUSCULAR ONCE
Status: COMPLETED | OUTPATIENT
Start: 2019-09-02 | End: 2019-09-02

## 2019-09-02 RX ORDER — ACETAMINOPHEN AND CODEINE PHOSPHATE 300; 30 MG/1; MG/1
1 TABLET ORAL EVERY 12 HOURS PRN
Qty: 3 TABLET | Refills: 0 | Status: SHIPPED | OUTPATIENT
Start: 2019-09-02 | End: 2019-09-04

## 2019-09-02 RX ORDER — MECLIZINE HYDROCHLORIDE 25 MG/1
25 TABLET ORAL 3 TIMES DAILY PRN
Qty: 20 TABLET | Refills: 0 | Status: SHIPPED | OUTPATIENT
Start: 2019-09-02 | End: 2019-09-12

## 2019-09-02 RX ADMIN — ONDANSETRON 4 MG: 2 INJECTION INTRAMUSCULAR; INTRAVENOUS at 11:36

## 2019-09-02 RX ADMIN — LORAZEPAM 1 MG: 2 INJECTION INTRAMUSCULAR; INTRAVENOUS at 11:36

## 2019-09-02 RX ADMIN — FENTANYL CITRATE 50 MCG: 50 INJECTION INTRAMUSCULAR; INTRAVENOUS at 11:36

## 2019-09-02 ASSESSMENT — PAIN SCALES - GENERAL
PAINLEVEL_OUTOF10: 8
PAINLEVEL_OUTOF10: 8
PAINLEVEL_OUTOF10: 3
PAINLEVEL_OUTOF10: 2

## 2019-09-02 ASSESSMENT — PAIN DESCRIPTION - ONSET: ONSET: GRADUAL

## 2019-09-02 ASSESSMENT — PAIN DESCRIPTION - PAIN TYPE: TYPE: ACUTE PAIN

## 2019-09-02 ASSESSMENT — PAIN DESCRIPTION - FREQUENCY: FREQUENCY: CONTINUOUS

## 2019-09-02 ASSESSMENT — PAIN DESCRIPTION - LOCATION: LOCATION: HEAD;NECK

## 2019-09-02 ASSESSMENT — PAIN DESCRIPTION - DESCRIPTORS: DESCRIPTORS: THROBBING

## 2019-09-02 ASSESSMENT — PAIN DESCRIPTION - PROGRESSION: CLINICAL_PROGRESSION: GRADUALLY WORSENING

## 2019-09-11 ENCOUNTER — APPOINTMENT (OUTPATIENT)
Dept: CT IMAGING | Age: 39
End: 2019-09-11
Payer: MEDICAID

## 2019-09-11 ENCOUNTER — HOSPITAL ENCOUNTER (EMERGENCY)
Age: 39
Discharge: HOME | End: 2019-09-11
Payer: MEDICAID

## 2019-09-11 VITALS
HEIGHT: 59 IN | TEMPERATURE: 98.5 F | RESPIRATION RATE: 18 BRPM | WEIGHT: 157 LBS | OXYGEN SATURATION: 98 % | HEART RATE: 75 BPM | BODY MASS INDEX: 31.65 KG/M2 | SYSTOLIC BLOOD PRESSURE: 112 MMHG | DIASTOLIC BLOOD PRESSURE: 78 MMHG

## 2019-09-11 DIAGNOSIS — R10.9 ABDOMINAL PAIN, UNSPECIFIED ABDOMINAL LOCATION: Primary | ICD-10-CM

## 2019-09-11 DIAGNOSIS — R33.9 URINARY RETENTION: ICD-10-CM

## 2019-09-11 LAB
A/G RATIO: 1.3 (ref 1.1–2.2)
ALBUMIN SERPL-MCNC: 4.5 G/DL (ref 3.4–5)
ALP BLD-CCNC: 95 U/L (ref 40–129)
ALT SERPL-CCNC: 47 U/L (ref 10–40)
ANION GAP SERPL CALCULATED.3IONS-SCNC: 16 MMOL/L (ref 3–16)
AST SERPL-CCNC: 18 U/L (ref 15–37)
BASOPHILS ABSOLUTE: 0.1 K/UL (ref 0–0.2)
BASOPHILS RELATIVE PERCENT: 0.6 %
BILIRUB SERPL-MCNC: 0.4 MG/DL (ref 0–1)
BILIRUBIN URINE: NEGATIVE
BLOOD, URINE: NEGATIVE
BUN BLDV-MCNC: 10 MG/DL (ref 7–20)
CALCIUM SERPL-MCNC: 10 MG/DL (ref 8.3–10.6)
CHLORIDE BLD-SCNC: 103 MMOL/L (ref 99–110)
CLARITY: CLEAR
CO2: 24 MMOL/L (ref 21–32)
COLOR: YELLOW
CREAT SERPL-MCNC: 0.6 MG/DL (ref 0.6–1.1)
EOSINOPHILS ABSOLUTE: 0.2 K/UL (ref 0–0.6)
EOSINOPHILS RELATIVE PERCENT: 1.6 %
GFR AFRICAN AMERICAN: >60
GFR NON-AFRICAN AMERICAN: >60
GLOBULIN: 3.4 G/DL
GLUCOSE BLD-MCNC: 133 MG/DL (ref 70–99)
GLUCOSE URINE: NEGATIVE MG/DL
HCG(URINE) PREGNANCY TEST: NEGATIVE
HCT VFR BLD CALC: 46 % (ref 36–48)
HEMOGLOBIN: 15.5 G/DL (ref 12–16)
KETONES, URINE: NEGATIVE MG/DL
LEUKOCYTE ESTERASE, URINE: NEGATIVE
LYMPHOCYTES ABSOLUTE: 4.5 K/UL (ref 1–5.1)
LYMPHOCYTES RELATIVE PERCENT: 43.7 %
MCH RBC QN AUTO: 29.2 PG (ref 26–34)
MCHC RBC AUTO-ENTMCNC: 33.7 G/DL (ref 31–36)
MCV RBC AUTO: 86.6 FL (ref 80–100)
MICROSCOPIC EXAMINATION: NORMAL
MONOCYTES ABSOLUTE: 0.4 K/UL (ref 0–1.3)
MONOCYTES RELATIVE PERCENT: 4 %
NEUTROPHILS ABSOLUTE: 5.1 K/UL (ref 1.7–7.7)
NEUTROPHILS RELATIVE PERCENT: 50.1 %
NITRITE, URINE: NEGATIVE
PDW BLD-RTO: 13.9 % (ref 12.4–15.4)
PH UA: 7.5 (ref 5–8)
PLATELET # BLD: 262 K/UL (ref 135–450)
PMV BLD AUTO: 8 FL (ref 5–10.5)
POTASSIUM SERPL-SCNC: 3.7 MMOL/L (ref 3.5–5.1)
PROTEIN UA: NEGATIVE MG/DL
RBC # BLD: 5.31 M/UL (ref 4–5.2)
SODIUM BLD-SCNC: 143 MMOL/L (ref 136–145)
SPECIFIC GRAVITY UA: 1.01 (ref 1–1.03)
TOTAL PROTEIN: 7.9 G/DL (ref 6.4–8.2)
URINE REFLEX TO CULTURE: NORMAL
URINE TYPE: NORMAL
UROBILINOGEN, URINE: 0.2 E.U./DL
WBC # BLD: 10.2 K/UL (ref 4–11)

## 2019-09-11 PROCEDURE — 6360000002 HC RX W HCPCS: Performed by: PHYSICIAN ASSISTANT

## 2019-09-11 PROCEDURE — 2580000003 HC RX 258: Performed by: PHYSICIAN ASSISTANT

## 2019-09-11 PROCEDURE — 6370000000 HC RX 637 (ALT 250 FOR IP): Performed by: PHYSICIAN ASSISTANT

## 2019-09-11 PROCEDURE — 99284 EMERGENCY DEPT VISIT MOD MDM: CPT

## 2019-09-11 PROCEDURE — 96375 TX/PRO/DX INJ NEW DRUG ADDON: CPT

## 2019-09-11 PROCEDURE — 74176 CT ABD & PELVIS W/O CONTRAST: CPT

## 2019-09-11 PROCEDURE — 36415 COLL VENOUS BLD VENIPUNCTURE: CPT

## 2019-09-11 PROCEDURE — 96374 THER/PROPH/DIAG INJ IV PUSH: CPT

## 2019-09-11 PROCEDURE — 85025 COMPLETE CBC W/AUTO DIFF WBC: CPT

## 2019-09-11 PROCEDURE — 51798 US URINE CAPACITY MEASURE: CPT

## 2019-09-11 PROCEDURE — 80053 COMPREHEN METABOLIC PANEL: CPT

## 2019-09-11 PROCEDURE — 51702 INSERT TEMP BLADDER CATH: CPT

## 2019-09-11 PROCEDURE — 81003 URINALYSIS AUTO W/O SCOPE: CPT

## 2019-09-11 PROCEDURE — 84703 CHORIONIC GONADOTROPIN ASSAY: CPT

## 2019-09-11 PROCEDURE — 96361 HYDRATE IV INFUSION ADD-ON: CPT

## 2019-09-11 RX ORDER — ONDANSETRON 2 MG/ML
4 INJECTION INTRAMUSCULAR; INTRAVENOUS ONCE
Status: COMPLETED | OUTPATIENT
Start: 2019-09-11 | End: 2019-09-11

## 2019-09-11 RX ORDER — ONDANSETRON 4 MG/1
4-8 TABLET, ORALLY DISINTEGRATING ORAL EVERY 12 HOURS PRN
Qty: 12 TABLET | Refills: 0 | Status: SHIPPED | OUTPATIENT
Start: 2019-09-11 | End: 2019-10-25

## 2019-09-11 RX ORDER — ONDANSETRON 4 MG/1
4 TABLET, ORALLY DISINTEGRATING ORAL ONCE
Status: COMPLETED | OUTPATIENT
Start: 2019-09-11 | End: 2019-09-11

## 2019-09-11 RX ORDER — 0.9 % SODIUM CHLORIDE 0.9 %
1000 INTRAVENOUS SOLUTION INTRAVENOUS ONCE
Status: COMPLETED | OUTPATIENT
Start: 2019-09-11 | End: 2019-09-11

## 2019-09-11 RX ORDER — OXYCODONE HYDROCHLORIDE AND ACETAMINOPHEN 5; 325 MG/1; MG/1
1 TABLET ORAL ONCE
Status: COMPLETED | OUTPATIENT
Start: 2019-09-11 | End: 2019-09-11

## 2019-09-11 RX ORDER — MORPHINE SULFATE 4 MG/ML
4 INJECTION, SOLUTION INTRAMUSCULAR; INTRAVENOUS ONCE
Status: COMPLETED | OUTPATIENT
Start: 2019-09-11 | End: 2019-09-11

## 2019-09-11 RX ORDER — TRAMADOL HYDROCHLORIDE 50 MG/1
50 TABLET ORAL EVERY 6 HOURS PRN
Qty: 12 TABLET | Refills: 0 | Status: SHIPPED | OUTPATIENT
Start: 2019-09-11 | End: 2019-09-12 | Stop reason: ALTCHOICE

## 2019-09-11 RX ADMIN — ONDANSETRON 4 MG: 4 TABLET, ORALLY DISINTEGRATING ORAL at 17:41

## 2019-09-11 RX ADMIN — ONDANSETRON 4 MG: 2 INJECTION INTRAMUSCULAR; INTRAVENOUS at 14:50

## 2019-09-11 RX ADMIN — MORPHINE SULFATE 4 MG: 4 INJECTION, SOLUTION INTRAMUSCULAR; INTRAVENOUS at 16:33

## 2019-09-11 RX ADMIN — SODIUM CHLORIDE 1000 ML: 9 INJECTION, SOLUTION INTRAVENOUS at 14:50

## 2019-09-11 RX ADMIN — OXYCODONE HYDROCHLORIDE AND ACETAMINOPHEN 1 TABLET: 5; 325 TABLET ORAL at 17:41

## 2019-09-11 ASSESSMENT — ENCOUNTER SYMPTOMS
BACK PAIN: 0
VOMITING: 1
NAUSEA: 1
ABDOMINAL PAIN: 1
FACIAL SWELLING: 0
APNEA: 0
EYE DISCHARGE: 0
SORE THROAT: 0
SHORTNESS OF BREATH: 0
EYE REDNESS: 0
CHOKING: 0

## 2019-09-11 ASSESSMENT — PAIN - FUNCTIONAL ASSESSMENT: PAIN_FUNCTIONAL_ASSESSMENT: PREVENTS OR INTERFERES SOME ACTIVE ACTIVITIES AND ADLS

## 2019-09-11 ASSESSMENT — PAIN DESCRIPTION - FREQUENCY: FREQUENCY: INTERMITTENT

## 2019-09-11 ASSESSMENT — PAIN SCALES - GENERAL
PAINLEVEL_OUTOF10: 9
PAINLEVEL_OUTOF10: 7
PAINLEVEL_OUTOF10: 3
PAINLEVEL_OUTOF10: 3
PAINLEVEL_OUTOF10: 9

## 2019-09-11 ASSESSMENT — PAIN DESCRIPTION - ONSET: ONSET: ON-GOING

## 2019-09-11 ASSESSMENT — PAIN DESCRIPTION - ORIENTATION: ORIENTATION: RIGHT

## 2019-09-11 ASSESSMENT — PAIN DESCRIPTION - PROGRESSION: CLINICAL_PROGRESSION: GRADUALLY WORSENING

## 2019-09-11 ASSESSMENT — PAIN DESCRIPTION - DESCRIPTORS: DESCRIPTORS: ACHING

## 2019-09-11 ASSESSMENT — PAIN DESCRIPTION - PAIN TYPE: TYPE: ACUTE PAIN

## 2019-09-11 ASSESSMENT — PAIN DESCRIPTION - LOCATION: LOCATION: FLANK

## 2019-09-11 NOTE — ED NOTES
Pt states she is not able to urinate. States she feels the urge to urinate but can only get a couple drops of urine out. CRISPIN Soliz made aware.       Jacquelyn Cazares RN  09/11/19 8361

## 2019-09-11 NOTE — ED PROVIDER NOTES
 Tobacco abuse 2018     (ventriculoperitoneal) shunt status 1982    Wears glasses     reading         Procedure Laterality Date    APPENDECTOMY  2003    appendicitis     SECTION      placenta previa    CHOLECYSTECTOMY      COLONOSCOPY  2004    COLPOSCOPY      CSF SHUNT      replaced at age 17    CYSTOSCOPY      stone removal   [facility]    inguinal    HYSTERECTOMY, TOTAL ABDOMINAL  2016    TAHBSO, adhesions/PCOS    KNEE ARTHROSCOPY Left 2015    medial meniscectomy, chondroplasty, plica resection    OTHER SURGICAL HISTORY  10/19/2016    op lap    VENTRICULOPERITONEAL SHUNT      multiple revisions       Medications:  Previous Medications    MECLIZINE (ANTIVERT) 25 MG TABLET    Take 1 tablet by mouth 3 times daily as needed for Dizziness    ONDANSETRON (ZOFRAN) 4 MG TABLET    Take 1 tablet by mouth every 8 hours as needed for Nausea or Vomiting         Review of Systems:  Review of Systems   Constitutional: Negative for chills and fever. HENT: Negative for congestion, facial swelling and sore throat. Eyes: Negative for discharge and redness. Respiratory: Negative for apnea, choking and shortness of breath. Cardiovascular: Negative for chest pain. Gastrointestinal: Positive for abdominal pain, nausea and vomiting. Genitourinary: Positive for flank pain. Negative for dysuria. Musculoskeletal: Negative for back pain, neck pain and neck stiffness. Neurological: Negative for dizziness, tremors, seizures, weakness and headaches. All other systems reviewed and are negative. Positives and Pertinent negatives as per HPI. Except as noted above in the ROS, problem specific ROS was completed and is negative. Physical Exam:  Physical Exam   Constitutional: She is oriented to person, place, and time. She appears well-developed and well-nourished. HENT:   Head: Normocephalic and atraumatic.    Nose: Nose normal.   Eyes: Right eye exhibits no [id number]   Phone ([phone] of labs reviewed and werenegative at this time or not returned at the time of this note. RADIOLOGY:   Non-plain film images such as CT, Ultrasound and MRI are read by the radiologist. Joanie Mcpherson PA-C have directly visualized the radiologic plain film image(s) with the below findings:        Interpretation per the Radiologist below, if available at the time of thisnote:    [facility]   Final Result   No acute intra-abdominal abnormality or explanation for pain. Punctate nonobstructing nephrolithiasis bilaterally. No results found. MEDICAL DECISION MAKING / ED COURSE:      PROCEDURES:   Procedures    None    Patient was given:  Medications   ondansetron (ZOFRAN) injection 4 mg (4 mg Intravenous Given 9/11/19 1450)   0.9 % sodium chloride bolus (0 mLs Intravenous Stopped 9/11/19 1601)   morphine (PF) injection 4 mg (4 mg Intravenous Given 9/11/19 1633)   oxyCODONE-acetaminophen (PERCOCET) 5-325 MG per tablet 1 tablet (1 tablet Oral Given 9/11/19 1741)   ondansetron (ZOFRAN-ODT) disintegrating tablet 4 mg (4 mg Oral Given 9/11/19 1741)       Emergency room course: Patient on exam cardiovascular regular rhythm, lungs are clear no wheeze rales or rhonchi. No chest wall tenderness with palpation. Abdomen shows mild right-sided tenderness that radiates to the right flank. This is with question. And right suprapubic region with palpation. No guarding noted. Normal bowel sounds all 4 quadrant. No palpable mass. Full range of motion of extremity. Neurologically patient has no motor or sensory deficit noted. She is alert and oriented x4. Does not appear to be in acute distress. Lab results:    CBC with white count of 10.2, RBC 5.31, hemoglobin 15.5 and hematocrit 46.0. CMP shows sodium of 143, potassium 3.7, chloride 101 with a BUN of 10 and creatinine 0.6. ALT slightly elevated at 47.   Normal alkaline

## 2019-09-12 ENCOUNTER — HOSPITAL ENCOUNTER (EMERGENCY)
Age: 39
Discharge: HOME | End: 2019-09-12
Attending: EMERGENCY MEDICINE
Payer: MEDICAID

## 2019-09-12 VITALS
OXYGEN SATURATION: 99 % | DIASTOLIC BLOOD PRESSURE: 79 MMHG | BODY MASS INDEX: 32.24 KG/M2 | TEMPERATURE: 98.6 F | WEIGHT: 159.61 LBS | HEART RATE: 74 BPM | RESPIRATION RATE: 14 BRPM | SYSTOLIC BLOOD PRESSURE: 109 MMHG

## 2019-09-12 DIAGNOSIS — R10.9 RIGHT FLANK PAIN: Primary | ICD-10-CM

## 2019-09-12 LAB
A/G RATIO: 1.4 (ref 1.1–2.2)
ALBUMIN SERPL-MCNC: 4.1 G/DL (ref 3.4–5)
ALP BLD-CCNC: 85 U/L (ref 40–129)
ALT SERPL-CCNC: 37 U/L (ref 10–40)
ANION GAP SERPL CALCULATED.3IONS-SCNC: 15 MMOL/L (ref 3–16)
AST SERPL-CCNC: 14 U/L (ref 15–37)
BASOPHILS ABSOLUTE: 0 K/UL (ref 0–0.2)
BASOPHILS RELATIVE PERCENT: 0.5 %
BILIRUB SERPL-MCNC: 0.3 MG/DL (ref 0–1)
BUN BLDV-MCNC: 14 MG/DL (ref 7–20)
CALCIUM SERPL-MCNC: 9.2 MG/DL (ref 8.3–10.6)
CHLORIDE BLD-SCNC: 102 MMOL/L (ref 99–110)
CO2: 23 MMOL/L (ref 21–32)
CREAT SERPL-MCNC: 0.6 MG/DL (ref 0.6–1.1)
EOSINOPHILS ABSOLUTE: 0.1 K/UL (ref 0–0.6)
EOSINOPHILS RELATIVE PERCENT: 1.1 %
GFR AFRICAN AMERICAN: >60
GFR NON-AFRICAN AMERICAN: >60
GLOBULIN: 2.9 G/DL
GLUCOSE BLD-MCNC: 103 MG/DL (ref 70–99)
HCT VFR BLD CALC: 42.2 % (ref 36–48)
HEMOGLOBIN: 14.6 G/DL (ref 12–16)
LYMPHOCYTES ABSOLUTE: 3 K/UL (ref 1–5.1)
LYMPHOCYTES RELATIVE PERCENT: 34.6 %
MCH RBC QN AUTO: 29.9 PG (ref 26–34)
MCHC RBC AUTO-ENTMCNC: 34.6 G/DL (ref 31–36)
MCV RBC AUTO: 86.6 FL (ref 80–100)
MONOCYTES ABSOLUTE: 0.6 K/UL (ref 0–1.3)
MONOCYTES RELATIVE PERCENT: 6.9 %
NEUTROPHILS ABSOLUTE: 5 K/UL (ref 1.7–7.7)
NEUTROPHILS RELATIVE PERCENT: 56.9 %
PDW BLD-RTO: 13.6 % (ref 12.4–15.4)
PLATELET # BLD: 233 K/UL (ref 135–450)
PMV BLD AUTO: 7.8 FL (ref 5–10.5)
POTASSIUM REFLEX MAGNESIUM: 3.8 MMOL/L (ref 3.5–5.1)
RBC # BLD: 4.87 M/UL (ref 4–5.2)
SODIUM BLD-SCNC: 140 MMOL/L (ref 136–145)
TOTAL PROTEIN: 7 G/DL (ref 6.4–8.2)
WBC # BLD: 8.7 K/UL (ref 4–11)

## 2019-09-12 PROCEDURE — 85025 COMPLETE CBC W/AUTO DIFF WBC: CPT

## 2019-09-12 PROCEDURE — 99283 EMERGENCY DEPT VISIT LOW MDM: CPT

## 2019-09-12 PROCEDURE — 6370000000 HC RX 637 (ALT 250 FOR IP): Performed by: EMERGENCY MEDICINE

## 2019-09-12 PROCEDURE — 80053 COMPREHEN METABOLIC PANEL: CPT

## 2019-09-12 RX ORDER — OXYCODONE HYDROCHLORIDE AND ACETAMINOPHEN 5; 325 MG/1; MG/1
1 TABLET ORAL ONCE
Status: COMPLETED | OUTPATIENT
Start: 2019-09-12 | End: 2019-09-12

## 2019-09-12 RX ORDER — OXYCODONE HYDROCHLORIDE AND ACETAMINOPHEN 5; 325 MG/1; MG/1
1 TABLET ORAL EVERY 6 HOURS PRN
Qty: 12 TABLET | Refills: 0
Start: 2019-09-12 | End: 2019-09-12 | Stop reason: SDUPTHER

## 2019-09-12 RX ORDER — OXYCODONE HYDROCHLORIDE AND ACETAMINOPHEN 5; 325 MG/1; MG/1
1 TABLET ORAL EVERY 6 HOURS PRN
Qty: 12 TABLET | Refills: 0 | Status: SHIPPED | OUTPATIENT
Start: 2019-09-12 | End: 2019-09-15

## 2019-09-12 RX ORDER — ONDANSETRON 4 MG/1
4 TABLET, FILM COATED ORAL ONCE
Status: COMPLETED | OUTPATIENT
Start: 2019-09-12 | End: 2019-09-12

## 2019-09-12 RX ORDER — HYDROCODONE BITARTRATE AND ACETAMINOPHEN 5; 325 MG/1; MG/1
1 TABLET ORAL ONCE
Status: DISCONTINUED | OUTPATIENT
Start: 2019-09-12 | End: 2019-09-12

## 2019-09-12 RX ADMIN — ONDANSETRON HYDROCHLORIDE 4 MG: 4 TABLET, FILM COATED ORAL at 11:32

## 2019-09-12 RX ADMIN — OXYCODONE HYDROCHLORIDE AND ACETAMINOPHEN 1 TABLET: 5; 325 TABLET ORAL at 11:32

## 2019-09-12 ASSESSMENT — PAIN SCALES - GENERAL
PAINLEVEL_OUTOF10: 10
PAINLEVEL_OUTOF10: 8
PAINLEVEL_OUTOF10: 9
PAINLEVEL_OUTOF10: 5

## 2019-09-12 ASSESSMENT — PAIN DESCRIPTION - LOCATION: LOCATION: FLANK

## 2019-09-12 ASSESSMENT — PAIN DESCRIPTION - PAIN TYPE: TYPE: ACUTE PAIN

## 2019-09-12 ASSESSMENT — PAIN DESCRIPTION - ORIENTATION: ORIENTATION: RIGHT

## 2019-09-12 NOTE — ED PROVIDER NOTES
CHIEF COMPLAINT  Other (states cannot follow up with urologist due to outstanding bill)      Power Mcknight is a 42 y.o. female who presents to the ED complaining of right-sided flank pain that is continued after being seen here yesterday in the emergency room for the same complaint. Patient was diagnosed with urinary retention and a Guzman catheter was placed. Patient was supposed to follow-up with urology and states she did attempt to this morning. States she called the office of Dr Kerri Angeles today and try to schedule appointment. States she was told that she would not be scheduled for an appointment as she has an outstanding bill. Patient states she has been taking her tramadol but has been feeling nauseous with it. Denies any fevers or chills. States she still does have a Guzman catheter in place and is functioning normally. Patient states the person she spoke to in the urology office told her to come to the emergency room for evaluation and that if she was admitted they could see her in the hospital.  Denies any chest pain or shortness of breath. No change in bowel movements. No vomiting. No other complaints, modifying factors or associated symptoms. Nursing notes reviewed. Past Medical History:   Diagnosis Date    ADHD (attention deficit hyperactivity disorder) 82    Anesthesia complication     pt states with general anesthesia, she gets very nauseated and wakes up with a migrain     Anxiety     Difficult intubation     Functional ovarian cysts 2008    rt ovary cyst x 2 yrs.     Headache(784.0)     migraines    Hiatal hernia     History of blood transfusion     History of kidney stones     History of PCOS     Hydrocephalus     Irritable bowel syndrome 2004    had colonoscopy about 6 yrs ago    Meningitis     PONV (postoperative nausea and vomiting)     only with general anesthesia    Primary osteoarthritis of left knee 7/1/2016    S/P cone biopsy of insurance company as there is nothing we can do from the emergency room to change the coverage of her insurance. Attempted to have financial aid and  talk with patient. Provided patient resources. Patient's lab work unremarkable. Patient had pregnancy test and urinalysis performed yesterday which were both negative. Denies any urinary complaints. No vaginal complaints. Plan for discharge with outpatient follow-up. Patient agreeable care plan. I estimate there is LOW risk for ACUTE APPENDICITIS, BOWEL OBSTRUCTION, CHOLECYSTITIS, DIVERTICULITIS, INCARCERATED HERNIA, PANCREATITIS, PELVIC INFLAMMATORY DISEASE, PERFORATED BOWEL or ULCER, PREGNANCY, or TUBO-OVARIAN ABSCESS, thus I consider the discharge disposition reasonable. Also, there is no evidence or peritonitis, sepsis, or toxicity. Courtney Edmondson and I have discussed the diagnosis and risks, and we agree with discharging home to follow-up with their primary doctor. We also discussed returning to the Emergency Department immediately if new or worsening symptoms occur. We have discussed the symptoms which are most concerning (e.g., bloody stool, fever, changing or worsening pain, vomiting) that necessitate immediate return. Patient was given scripts for the following medications. I counseled patient how to take these medications. New Prescriptions    OXYCODONE-ACETAMINOPHEN (PERCOCET) 5-325 MG PER TABLET    Take 1 tablet by mouth every 6 hours as needed for Pain for up to 3 days. Intended supply: 3 days. Take lowest dose possible to manage pain           CLINICAL IMPRESSION  1. Right flank pain        Blood pressure 118/81, pulse 78, temperature 98.6 °F (37 °C), temperature source Oral, resp. rate 16, weight 159 lb 9.8 oz (72.4 kg), last menstrual period 10/31/2016, SpO2 98 %, not currently breastfeeding. DISPOSITION  Patient was discharged to home in good condition.     Ellie Iverson MD  [address] [place] [address]

## 2019-10-15 ENCOUNTER — APPOINTMENT (OUTPATIENT)
Dept: CT IMAGING | Age: 39
End: 2019-10-15
Payer: MEDICAID

## 2019-10-15 ENCOUNTER — HOSPITAL ENCOUNTER (EMERGENCY)
Age: 39
Discharge: HOME | End: 2019-10-15
Payer: MEDICAID

## 2019-10-15 VITALS
HEIGHT: 59 IN | RESPIRATION RATE: 18 BRPM | WEIGHT: 156.09 LBS | BODY MASS INDEX: 31.47 KG/M2 | OXYGEN SATURATION: 100 % | TEMPERATURE: 97.4 F | SYSTOLIC BLOOD PRESSURE: 123 MMHG | HEART RATE: 91 BPM | DIASTOLIC BLOOD PRESSURE: 71 MMHG

## 2019-10-15 DIAGNOSIS — R33.9 URINARY RETENTION: Primary | ICD-10-CM

## 2019-10-15 LAB
BILIRUBIN URINE: NEGATIVE
BLOOD, URINE: NEGATIVE
CLARITY: CLEAR
COLOR: YELLOW
GLUCOSE URINE: NEGATIVE MG/DL
KETONES, URINE: NEGATIVE MG/DL
LEUKOCYTE ESTERASE, URINE: NEGATIVE
MICROSCOPIC EXAMINATION: NORMAL
NITRITE, URINE: NEGATIVE
PH UA: 6.5 (ref 5–8)
PROTEIN UA: NEGATIVE MG/DL
SPECIFIC GRAVITY UA: <1.005 (ref 1–1.03)
URINE REFLEX TO CULTURE: NORMAL
URINE TYPE: NORMAL
UROBILINOGEN, URINE: 0.2 E.U./DL

## 2019-10-15 PROCEDURE — 51702 INSERT TEMP BLADDER CATH: CPT

## 2019-10-15 PROCEDURE — 81003 URINALYSIS AUTO W/O SCOPE: CPT

## 2019-10-15 PROCEDURE — 51798 US URINE CAPACITY MEASURE: CPT

## 2019-10-15 PROCEDURE — 99284 EMERGENCY DEPT VISIT MOD MDM: CPT

## 2019-10-15 PROCEDURE — 6370000000 HC RX 637 (ALT 250 FOR IP): Performed by: PHYSICIAN ASSISTANT

## 2019-10-15 PROCEDURE — 74176 CT ABD & PELVIS W/O CONTRAST: CPT

## 2019-10-15 RX ORDER — TRAMADOL HYDROCHLORIDE 50 MG/1
50 TABLET ORAL EVERY 6 HOURS PRN
Qty: 12 TABLET | Refills: 0 | Status: SHIPPED | OUTPATIENT
Start: 2019-10-15 | End: 2019-10-18

## 2019-10-15 RX ORDER — OXYCODONE HYDROCHLORIDE AND ACETAMINOPHEN 5; 325 MG/1; MG/1
1 TABLET ORAL ONCE
Status: COMPLETED | OUTPATIENT
Start: 2019-10-15 | End: 2019-10-15

## 2019-10-15 RX ORDER — ONDANSETRON 4 MG/1
4 TABLET, ORALLY DISINTEGRATING ORAL ONCE
Status: COMPLETED | OUTPATIENT
Start: 2019-10-15 | End: 2019-10-15

## 2019-10-15 RX ADMIN — OXYCODONE HYDROCHLORIDE AND ACETAMINOPHEN 1 TABLET: 5; 325 TABLET ORAL at 17:27

## 2019-10-15 RX ADMIN — ONDANSETRON 4 MG: 4 TABLET, ORALLY DISINTEGRATING ORAL at 17:25

## 2019-10-15 ASSESSMENT — ENCOUNTER SYMPTOMS
FACIAL SWELLING: 0
NAUSEA: 1
BACK PAIN: 0
EYE REDNESS: 0
VOMITING: 0
APNEA: 0
SHORTNESS OF BREATH: 0
SORE THROAT: 0
ABDOMINAL PAIN: 1
EYE DISCHARGE: 0
CHOKING: 0

## 2019-10-15 ASSESSMENT — PAIN DESCRIPTION - DESCRIPTORS
DESCRIPTORS: PRESSURE
DESCRIPTORS: PRESSURE

## 2019-10-15 ASSESSMENT — PAIN DESCRIPTION - ONSET
ONSET: PROGRESSIVE
ONSET: ON-GOING

## 2019-10-15 ASSESSMENT — PAIN DESCRIPTION - PAIN TYPE
TYPE: ACUTE PAIN
TYPE: ACUTE PAIN

## 2019-10-15 ASSESSMENT — PAIN - FUNCTIONAL ASSESSMENT
PAIN_FUNCTIONAL_ASSESSMENT: PREVENTS OR INTERFERES SOME ACTIVE ACTIVITIES AND ADLS
PAIN_FUNCTIONAL_ASSESSMENT: PREVENTS OR INTERFERES SOME ACTIVE ACTIVITIES AND ADLS

## 2019-10-15 ASSESSMENT — PAIN DESCRIPTION - PROGRESSION
CLINICAL_PROGRESSION: GRADUALLY WORSENING
CLINICAL_PROGRESSION: GRADUALLY IMPROVING

## 2019-10-15 ASSESSMENT — PAIN DESCRIPTION - LOCATION
LOCATION: ABDOMEN
LOCATION: PERINEUM;ABDOMEN

## 2019-10-15 ASSESSMENT — PAIN DESCRIPTION - FREQUENCY
FREQUENCY: CONTINUOUS
FREQUENCY: CONTINUOUS

## 2019-10-15 ASSESSMENT — PAIN SCALES - GENERAL
PAINLEVEL_OUTOF10: 9
PAINLEVEL_OUTOF10: 6
PAINLEVEL_OUTOF10: 8

## 2019-10-15 ASSESSMENT — PAIN DESCRIPTION - ORIENTATION: ORIENTATION: LOWER

## 2019-10-15 ASSESSMENT — PAIN SCALES - WONG BAKER: WONGBAKER_NUMERICALRESPONSE: 2

## 2019-10-20 ENCOUNTER — APPOINTMENT (OUTPATIENT)
Dept: ULTRASOUND IMAGING | Age: 39
End: 2019-10-20
Payer: COMMERCIAL

## 2019-10-20 ENCOUNTER — HOSPITAL ENCOUNTER (EMERGENCY)
Age: 39
Discharge: HOME | End: 2019-10-20
Attending: EMERGENCY MEDICINE
Payer: COMMERCIAL

## 2019-10-20 VITALS
HEART RATE: 74 BPM | OXYGEN SATURATION: 99 % | HEIGHT: 59 IN | TEMPERATURE: 99 F | WEIGHT: 164.24 LBS | BODY MASS INDEX: 33.11 KG/M2 | SYSTOLIC BLOOD PRESSURE: 110 MMHG | DIASTOLIC BLOOD PRESSURE: 72 MMHG | RESPIRATION RATE: 18 BRPM

## 2019-10-20 DIAGNOSIS — R10.9 FLANK PAIN: Primary | ICD-10-CM

## 2019-10-20 LAB
ANION GAP SERPL CALCULATED.3IONS-SCNC: 15 MMOL/L (ref 3–16)
BASOPHILS ABSOLUTE: 0 K/UL (ref 0–0.2)
BASOPHILS RELATIVE PERCENT: 0.4 %
BILIRUBIN URINE: NEGATIVE
BLOOD, URINE: NEGATIVE
BUN BLDV-MCNC: 11 MG/DL (ref 7–20)
CALCIUM SERPL-MCNC: 9.6 MG/DL (ref 8.3–10.6)
CHLORIDE BLD-SCNC: 102 MMOL/L (ref 99–110)
CLARITY: CLEAR
CO2: 24 MMOL/L (ref 21–32)
COLOR: YELLOW
CREAT SERPL-MCNC: 0.7 MG/DL (ref 0.6–1.1)
EOSINOPHILS ABSOLUTE: 0.1 K/UL (ref 0–0.6)
EOSINOPHILS RELATIVE PERCENT: 1.5 %
GFR AFRICAN AMERICAN: >60
GFR NON-AFRICAN AMERICAN: >60
GLUCOSE BLD-MCNC: 117 MG/DL (ref 70–99)
GLUCOSE URINE: NEGATIVE MG/DL
HCT VFR BLD CALC: 43.5 % (ref 36–48)
HEMOGLOBIN: 15 G/DL (ref 12–16)
KETONES, URINE: NEGATIVE MG/DL
LEUKOCYTE ESTERASE, URINE: NEGATIVE
LYMPHOCYTES ABSOLUTE: 2.8 K/UL (ref 1–5.1)
LYMPHOCYTES RELATIVE PERCENT: 30 %
MCH RBC QN AUTO: 30 PG (ref 26–34)
MCHC RBC AUTO-ENTMCNC: 34.6 G/DL (ref 31–36)
MCV RBC AUTO: 86.7 FL (ref 80–100)
MICROSCOPIC EXAMINATION: NORMAL
MONOCYTES ABSOLUTE: 0.3 K/UL (ref 0–1.3)
MONOCYTES RELATIVE PERCENT: 3.7 %
NEUTROPHILS ABSOLUTE: 6 K/UL (ref 1.7–7.7)
NEUTROPHILS RELATIVE PERCENT: 64.4 %
NITRITE, URINE: NEGATIVE
PDW BLD-RTO: 13.7 % (ref 12.4–15.4)
PH UA: 6.5 (ref 5–8)
PLATELET # BLD: 248 K/UL (ref 135–450)
PMV BLD AUTO: 7.8 FL (ref 5–10.5)
POTASSIUM REFLEX MAGNESIUM: 3.9 MMOL/L (ref 3.5–5.1)
PROTEIN UA: NEGATIVE MG/DL
RBC # BLD: 5.01 M/UL (ref 4–5.2)
SODIUM BLD-SCNC: 141 MMOL/L (ref 136–145)
SPECIFIC GRAVITY UA: 1.01 (ref 1–1.03)
URINE REFLEX TO CULTURE: NORMAL
URINE TYPE: NORMAL
UROBILINOGEN, URINE: 1 E.U./DL
WBC # BLD: 9.4 K/UL (ref 4–11)

## 2019-10-20 PROCEDURE — 96361 HYDRATE IV INFUSION ADD-ON: CPT

## 2019-10-20 PROCEDURE — 80048 BASIC METABOLIC PNL TOTAL CA: CPT

## 2019-10-20 PROCEDURE — 96375 TX/PRO/DX INJ NEW DRUG ADDON: CPT

## 2019-10-20 PROCEDURE — 76770 US EXAM ABDO BACK WALL COMP: CPT

## 2019-10-20 PROCEDURE — 81003 URINALYSIS AUTO W/O SCOPE: CPT

## 2019-10-20 PROCEDURE — 96374 THER/PROPH/DIAG INJ IV PUSH: CPT

## 2019-10-20 PROCEDURE — 99284 EMERGENCY DEPT VISIT MOD MDM: CPT

## 2019-10-20 PROCEDURE — 85025 COMPLETE CBC W/AUTO DIFF WBC: CPT

## 2019-10-20 PROCEDURE — 2580000003 HC RX 258: Performed by: EMERGENCY MEDICINE

## 2019-10-20 PROCEDURE — 6360000002 HC RX W HCPCS: Performed by: EMERGENCY MEDICINE

## 2019-10-20 RX ORDER — TRAMADOL HYDROCHLORIDE 50 MG/1
50 TABLET ORAL EVERY 8 HOURS PRN
Qty: 4 TABLET | Refills: 0 | Status: SHIPPED | OUTPATIENT
Start: 2019-10-20 | End: 2019-10-22

## 2019-10-20 RX ORDER — 0.9 % SODIUM CHLORIDE 0.9 %
500 INTRAVENOUS SOLUTION INTRAVENOUS ONCE
Status: COMPLETED | OUTPATIENT
Start: 2019-10-20 | End: 2019-10-20

## 2019-10-20 RX ORDER — ONDANSETRON 2 MG/ML
4 INJECTION INTRAMUSCULAR; INTRAVENOUS ONCE
Status: COMPLETED | OUTPATIENT
Start: 2019-10-20 | End: 2019-10-20

## 2019-10-20 RX ADMIN — LIDOCAINE HYDROCHLORIDE 70 MG: 20 INJECTION INTRAVENOUS at 13:47

## 2019-10-20 RX ADMIN — ONDANSETRON 4 MG: 2 INJECTION INTRAMUSCULAR; INTRAVENOUS at 13:13

## 2019-10-20 RX ADMIN — SODIUM CHLORIDE 500 ML: 9 INJECTION, SOLUTION INTRAVENOUS at 13:12

## 2019-10-20 ASSESSMENT — PAIN SCALES - GENERAL
PAINLEVEL_OUTOF10: 9
PAINLEVEL_OUTOF10: 8
PAINLEVEL_OUTOF10: 9

## 2019-10-20 ASSESSMENT — PAIN - FUNCTIONAL ASSESSMENT
PAIN_FUNCTIONAL_ASSESSMENT: PREVENTS OR INTERFERES SOME ACTIVE ACTIVITIES AND ADLS

## 2019-10-20 ASSESSMENT — PAIN DESCRIPTION - FREQUENCY
FREQUENCY: CONTINUOUS

## 2019-10-20 ASSESSMENT — ENCOUNTER SYMPTOMS
EYE REDNESS: 0
RHINORRHEA: 0
ABDOMINAL PAIN: 1
SORE THROAT: 0
SHORTNESS OF BREATH: 0

## 2019-10-20 ASSESSMENT — PAIN DESCRIPTION - PAIN TYPE
TYPE: CHRONIC PAIN

## 2019-10-20 ASSESSMENT — PAIN DESCRIPTION - ONSET
ONSET: ON-GOING

## 2019-10-20 ASSESSMENT — PAIN SCALES - WONG BAKER
WONGBAKER_NUMERICALRESPONSE: 0

## 2019-10-20 ASSESSMENT — PAIN DESCRIPTION - LOCATION
LOCATION: ABDOMEN

## 2019-10-20 ASSESSMENT — PAIN DESCRIPTION - ORIENTATION: ORIENTATION: LOWER

## 2019-10-20 ASSESSMENT — PAIN DESCRIPTION - DIRECTION: RADIATING_TOWARDS: FLANK

## 2019-10-22 ENCOUNTER — HOSPITAL ENCOUNTER (EMERGENCY)
Age: 39
Discharge: HOME | End: 2019-10-22
Attending: EMERGENCY MEDICINE
Payer: COMMERCIAL

## 2019-10-22 VITALS
HEART RATE: 83 BPM | RESPIRATION RATE: 18 BRPM | TEMPERATURE: 98.6 F | SYSTOLIC BLOOD PRESSURE: 138 MMHG | OXYGEN SATURATION: 100 % | DIASTOLIC BLOOD PRESSURE: 86 MMHG

## 2019-10-22 DIAGNOSIS — R10.9 RIGHT FLANK PAIN: Primary | ICD-10-CM

## 2019-10-22 LAB
A/G RATIO: 1.3 (ref 1.1–2.2)
ALBUMIN SERPL-MCNC: 4.6 G/DL (ref 3.4–5)
ALP BLD-CCNC: 83 U/L (ref 40–129)
ALT SERPL-CCNC: 19 U/L (ref 10–40)
ANION GAP SERPL CALCULATED.3IONS-SCNC: 12 MMOL/L (ref 3–16)
AST SERPL-CCNC: 12 U/L (ref 15–37)
BACTERIA: ABNORMAL /HPF
BASOPHILS ABSOLUTE: 0.1 K/UL (ref 0–0.2)
BASOPHILS RELATIVE PERCENT: 1.1 %
BILIRUB SERPL-MCNC: 0.3 MG/DL (ref 0–1)
BILIRUBIN URINE: NEGATIVE
BLOOD, URINE: ABNORMAL
BUN BLDV-MCNC: 12 MG/DL (ref 7–20)
CALCIUM SERPL-MCNC: 10.4 MG/DL (ref 8.3–10.6)
CHLORIDE BLD-SCNC: 105 MMOL/L (ref 99–110)
CLARITY: CLEAR
CO2: 25 MMOL/L (ref 21–32)
COLOR: ABNORMAL
CREAT SERPL-MCNC: 0.6 MG/DL (ref 0.6–1.1)
EOSINOPHILS ABSOLUTE: 0.2 K/UL (ref 0–0.6)
EOSINOPHILS RELATIVE PERCENT: 2.1 %
EPITHELIAL CELLS, UA: ABNORMAL /HPF
GFR AFRICAN AMERICAN: >60
GFR NON-AFRICAN AMERICAN: >60
GLOBULIN: 3.6 G/DL
GLUCOSE BLD-MCNC: 111 MG/DL (ref 70–99)
GLUCOSE URINE: NEGATIVE MG/DL
HCT VFR BLD CALC: 44.9 % (ref 36–48)
HEMOGLOBIN: 15.3 G/DL (ref 12–16)
KETONES, URINE: NEGATIVE MG/DL
LEUKOCYTE ESTERASE, URINE: NEGATIVE
LYMPHOCYTES ABSOLUTE: 3.6 K/UL (ref 1–5.1)
LYMPHOCYTES RELATIVE PERCENT: 37.7 %
MCH RBC QN AUTO: 30.1 PG (ref 26–34)
MCHC RBC AUTO-ENTMCNC: 34.1 G/DL (ref 31–36)
MCV RBC AUTO: 88.2 FL (ref 80–100)
MICROSCOPIC EXAMINATION: YES
MONOCYTES ABSOLUTE: 0.5 K/UL (ref 0–1.3)
MONOCYTES RELATIVE PERCENT: 5.3 %
NEUTROPHILS ABSOLUTE: 5.1 K/UL (ref 1.7–7.7)
NEUTROPHILS RELATIVE PERCENT: 53.8 %
NITRITE, URINE: NEGATIVE
PDW BLD-RTO: 13.8 % (ref 12.4–15.4)
PH UA: 7 (ref 5–8)
PLATELET # BLD: 259 K/UL (ref 135–450)
PMV BLD AUTO: 8 FL (ref 5–10.5)
POTASSIUM SERPL-SCNC: 4.1 MMOL/L (ref 3.5–5.1)
PROTEIN UA: NEGATIVE MG/DL
RBC # BLD: 5.1 M/UL (ref 4–5.2)
RBC UA: ABNORMAL /HPF (ref 0–2)
SODIUM BLD-SCNC: 142 MMOL/L (ref 136–145)
SPECIFIC GRAVITY UA: <=1.005 (ref 1–1.03)
TOTAL PROTEIN: 8.2 G/DL (ref 6.4–8.2)
URINE REFLEX TO CULTURE: ABNORMAL
URINE TYPE: ABNORMAL
UROBILINOGEN, URINE: 0.2 E.U./DL
WBC # BLD: 9.6 K/UL (ref 4–11)
WBC UA: ABNORMAL /HPF (ref 0–5)

## 2019-10-22 PROCEDURE — 85025 COMPLETE CBC W/AUTO DIFF WBC: CPT

## 2019-10-22 PROCEDURE — 80053 COMPREHEN METABOLIC PANEL: CPT

## 2019-10-22 PROCEDURE — 36415 COLL VENOUS BLD VENIPUNCTURE: CPT

## 2019-10-22 PROCEDURE — 81001 URINALYSIS AUTO W/SCOPE: CPT

## 2019-10-22 PROCEDURE — 6370000000 HC RX 637 (ALT 250 FOR IP): Performed by: EMERGENCY MEDICINE

## 2019-10-22 PROCEDURE — 99284 EMERGENCY DEPT VISIT MOD MDM: CPT

## 2019-10-22 RX ORDER — OXYCODONE HYDROCHLORIDE 5 MG/1
10 TABLET ORAL ONCE
Status: COMPLETED | OUTPATIENT
Start: 2019-10-22 | End: 2019-10-22

## 2019-10-22 RX ADMIN — OXYCODONE HYDROCHLORIDE 10 MG: 5 TABLET ORAL at 17:48

## 2019-10-22 ASSESSMENT — PAIN DESCRIPTION - FREQUENCY: FREQUENCY: CONTINUOUS

## 2019-10-22 ASSESSMENT — ENCOUNTER SYMPTOMS
TROUBLE SWALLOWING: 0
VOMITING: 0
WHEEZING: 0
STRIDOR: 0
SHORTNESS OF BREATH: 0
NAUSEA: 0
FACIAL SWELLING: 0
ABDOMINAL PAIN: 0
COLOR CHANGE: 0
VOICE CHANGE: 0

## 2019-10-22 ASSESSMENT — PAIN - FUNCTIONAL ASSESSMENT: PAIN_FUNCTIONAL_ASSESSMENT: 0-10

## 2019-10-22 ASSESSMENT — PAIN DESCRIPTION - DESCRIPTORS: DESCRIPTORS: SHARP

## 2019-10-22 ASSESSMENT — PAIN DESCRIPTION - PAIN TYPE: TYPE: ACUTE PAIN

## 2019-10-22 ASSESSMENT — PAIN DESCRIPTION - LOCATION: LOCATION: BACK

## 2019-10-22 ASSESSMENT — PAIN SCALES - GENERAL
PAINLEVEL_OUTOF10: 9

## 2019-10-22 ASSESSMENT — PAIN DESCRIPTION - ORIENTATION: ORIENTATION: LOWER

## 2019-10-25 ENCOUNTER — OFFICE VISIT (OUTPATIENT)
Dept: INTERNAL MEDICINE CLINIC | Age: 39
End: 2019-10-25
Payer: COMMERCIAL

## 2019-10-25 ENCOUNTER — HOSPITAL ENCOUNTER (EMERGENCY)
Age: 39
Discharge: HOME | DRG: 720 | End: 2019-10-25
Attending: EMERGENCY MEDICINE
Payer: COMMERCIAL

## 2019-10-25 VITALS
SYSTOLIC BLOOD PRESSURE: 142 MMHG | OXYGEN SATURATION: 99 % | HEIGHT: 59 IN | DIASTOLIC BLOOD PRESSURE: 128 MMHG | BODY MASS INDEX: 31.91 KG/M2 | HEART RATE: 85 BPM | WEIGHT: 158.29 LBS | RESPIRATION RATE: 19 BRPM | TEMPERATURE: 98.8 F

## 2019-10-25 VITALS
HEIGHT: 59 IN | WEIGHT: 158 LBS | DIASTOLIC BLOOD PRESSURE: 87 MMHG | SYSTOLIC BLOOD PRESSURE: 116 MMHG | OXYGEN SATURATION: 97 % | HEART RATE: 115 BPM | RESPIRATION RATE: 12 BRPM | BODY MASS INDEX: 31.85 KG/M2 | TEMPERATURE: 99 F

## 2019-10-25 DIAGNOSIS — G43.909 MIGRAINE WITHOUT STATUS MIGRAINOSUS, NOT INTRACTABLE, UNSPECIFIED MIGRAINE TYPE: ICD-10-CM

## 2019-10-25 DIAGNOSIS — F41.9 ANXIETY AND DEPRESSION: ICD-10-CM

## 2019-10-25 DIAGNOSIS — F32.A ANXIETY AND DEPRESSION: ICD-10-CM

## 2019-10-25 DIAGNOSIS — R33.9 URINARY RETENTION: Primary | ICD-10-CM

## 2019-10-25 DIAGNOSIS — N20.0 NEPHROLITHIASIS: ICD-10-CM

## 2019-10-25 DIAGNOSIS — Z00.00 PHYSICAL EXAM: Primary | ICD-10-CM

## 2019-10-25 DIAGNOSIS — F17.219 CIGARETTE NICOTINE DEPENDENCE WITH NICOTINE-INDUCED DISORDER: ICD-10-CM

## 2019-10-25 DIAGNOSIS — N30.01 ACUTE CYSTITIS WITH HEMATURIA: ICD-10-CM

## 2019-10-25 DIAGNOSIS — R00.0 TACHYCARDIA: ICD-10-CM

## 2019-10-25 LAB
A/G RATIO: 1.4 (ref 1.1–2.2)
ALBUMIN SERPL-MCNC: 4.5 G/DL (ref 3.4–5)
ALP BLD-CCNC: 83 U/L (ref 40–129)
ALT SERPL-CCNC: 20 U/L (ref 10–40)
ANION GAP SERPL CALCULATED.3IONS-SCNC: 13 MMOL/L (ref 3–16)
ANION GAP SERPL CALCULATED.3IONS-SCNC: 17 MMOL/L (ref 3–16)
AST SERPL-CCNC: 14 U/L (ref 15–37)
BACTERIA: ABNORMAL /HPF
BILIRUB SERPL-MCNC: 0.4 MG/DL (ref 0–1)
BILIRUBIN URINE: NEGATIVE
BILIRUBIN, POC: NORMAL
BLOOD URINE, POC: NORMAL
BLOOD, URINE: ABNORMAL
BUN BLDV-MCNC: 13 MG/DL (ref 7–20)
BUN BLDV-MCNC: 13 MG/DL (ref 7–20)
CALCIUM SERPL-MCNC: 10.3 MG/DL (ref 8.3–10.6)
CALCIUM SERPL-MCNC: 9.7 MG/DL (ref 8.3–10.6)
CHLORIDE BLD-SCNC: 103 MMOL/L (ref 99–110)
CHLORIDE BLD-SCNC: 99 MMOL/L (ref 99–110)
CHOLESTEROL, TOTAL: 241 MG/DL (ref 0–199)
CLARITY, POC: NORMAL
CLARITY: CLEAR
CO2: 23 MMOL/L (ref 21–32)
CO2: 25 MMOL/L (ref 21–32)
COLOR, POC: NORMAL
COLOR: YELLOW
CREAT SERPL-MCNC: 0.7 MG/DL (ref 0.6–1.1)
CREAT SERPL-MCNC: 0.7 MG/DL (ref 0.6–1.1)
EPITHELIAL CELLS, UA: ABNORMAL /HPF
GFR AFRICAN AMERICAN: >60
GFR AFRICAN AMERICAN: >60
GFR NON-AFRICAN AMERICAN: >60
GFR NON-AFRICAN AMERICAN: >60
GLOBULIN: 3.2 G/DL
GLUCOSE BLD-MCNC: 111 MG/DL (ref 70–99)
GLUCOSE BLD-MCNC: 98 MG/DL (ref 70–99)
GLUCOSE URINE, POC: NORMAL
GLUCOSE URINE: NEGATIVE MG/DL
HCG(URINE) PREGNANCY TEST: NEGATIVE
HCT VFR BLD CALC: 46 % (ref 36–48)
HDLC SERPL-MCNC: 35 MG/DL (ref 40–60)
HEMOGLOBIN: 16.1 G/DL (ref 12–16)
KETONES, POC: NORMAL
KETONES, URINE: NEGATIVE MG/DL
LDL CHOLESTEROL CALCULATED: ABNORMAL MG/DL
LDL CHOLESTEROL DIRECT: 162 MG/DL
LEUKOCYTE EST, POC: NORMAL
LEUKOCYTE ESTERASE, URINE: NEGATIVE
MCH RBC QN AUTO: 30.2 PG (ref 26–34)
MCHC RBC AUTO-ENTMCNC: 34.9 G/DL (ref 31–36)
MCV RBC AUTO: 86.4 FL (ref 80–100)
MICROSCOPIC EXAMINATION: YES
NITRITE, POC: NORMAL
NITRITE, URINE: NEGATIVE
PDW BLD-RTO: 13.9 % (ref 12.4–15.4)
PH UA: 7 (ref 5–8)
PH, POC: 7
PLATELET # BLD: 301 K/UL (ref 135–450)
PMV BLD AUTO: 8.5 FL (ref 5–10.5)
POTASSIUM SERPL-SCNC: 3.7 MMOL/L (ref 3.5–5.1)
POTASSIUM SERPL-SCNC: 4.4 MMOL/L (ref 3.5–5.1)
PROTEIN UA: NEGATIVE MG/DL
PROTEIN, POC: NORMAL
RBC # BLD: 5.33 M/UL (ref 4–5.2)
RBC UA: ABNORMAL /HPF (ref 0–2)
SODIUM BLD-SCNC: 139 MMOL/L (ref 136–145)
SODIUM BLD-SCNC: 141 MMOL/L (ref 136–145)
SPECIFIC GRAVITY UA: <=1.005 (ref 1–1.03)
SPECIFIC GRAVITY, POC: 1.01
TOTAL PROTEIN: 7.7 G/DL (ref 6.4–8.2)
TRICHOMONAS: ABNORMAL /HPF
TRIGL SERPL-MCNC: 355 MG/DL (ref 0–150)
TSH REFLEX FT4: 1.22 UIU/ML (ref 0.27–4.2)
URINE REFLEX TO CULTURE: ABNORMAL
URINE TYPE: ABNORMAL
UROBILINOGEN, POC: NORMAL
UROBILINOGEN, URINE: 0.2 E.U./DL
VLDLC SERPL CALC-MCNC: ABNORMAL MG/DL
WBC # BLD: 11.6 K/UL (ref 4–11)
WBC UA: ABNORMAL /HPF (ref 0–5)

## 2019-10-25 PROCEDURE — 99283 EMERGENCY DEPT VISIT LOW MDM: CPT

## 2019-10-25 PROCEDURE — 36415 COLL VENOUS BLD VENIPUNCTURE: CPT

## 2019-10-25 PROCEDURE — G8417 CALC BMI ABV UP PARAM F/U: HCPCS | Performed by: NURSE PRACTITIONER

## 2019-10-25 PROCEDURE — 81002 URINALYSIS NONAUTO W/O SCOPE: CPT | Performed by: NURSE PRACTITIONER

## 2019-10-25 PROCEDURE — 51702 INSERT TEMP BLADDER CATH: CPT

## 2019-10-25 PROCEDURE — 0T9B70Z DRAINAGE OF BLADDER WITH DRAINAGE DEVICE, VIA NATURAL OR ARTIFICIAL OPENING: ICD-10-PCS | Performed by: EMERGENCY MEDICINE

## 2019-10-25 PROCEDURE — 6370000000 HC RX 637 (ALT 250 FOR IP): Performed by: EMERGENCY MEDICINE

## 2019-10-25 PROCEDURE — 36415 COLL VENOUS BLD VENIPUNCTURE: CPT | Performed by: NURSE PRACTITIONER

## 2019-10-25 PROCEDURE — 4004F PT TOBACCO SCREEN RCVD TLK: CPT | Performed by: NURSE PRACTITIONER

## 2019-10-25 PROCEDURE — 81001 URINALYSIS AUTO W/SCOPE: CPT

## 2019-10-25 PROCEDURE — 80048 BASIC METABOLIC PNL TOTAL CA: CPT

## 2019-10-25 PROCEDURE — 51798 US URINE CAPACITY MEASURE: CPT

## 2019-10-25 PROCEDURE — 99406 BEHAV CHNG SMOKING 3-10 MIN: CPT | Performed by: NURSE PRACTITIONER

## 2019-10-25 PROCEDURE — G8427 DOCREV CUR MEDS BY ELIG CLIN: HCPCS | Performed by: NURSE PRACTITIONER

## 2019-10-25 PROCEDURE — 99205 OFFICE O/P NEW HI 60 MIN: CPT | Performed by: NURSE PRACTITIONER

## 2019-10-25 PROCEDURE — G8484 FLU IMMUNIZE NO ADMIN: HCPCS | Performed by: NURSE PRACTITIONER

## 2019-10-25 PROCEDURE — 84703 CHORIONIC GONADOTROPIN ASSAY: CPT

## 2019-10-25 RX ORDER — ONDANSETRON 4 MG/1
4 TABLET, FILM COATED ORAL ONCE
Status: COMPLETED | OUTPATIENT
Start: 2019-10-25 | End: 2019-10-25

## 2019-10-25 RX ORDER — NITROFURANTOIN 25; 75 MG/1; MG/1
100 CAPSULE ORAL 2 TIMES DAILY
Qty: 20 CAPSULE | Refills: 0 | Status: ON HOLD | OUTPATIENT
Start: 2019-10-25 | End: 2019-10-31 | Stop reason: HOSPADM

## 2019-10-25 RX ORDER — HYDROCODONE BITARTRATE AND ACETAMINOPHEN 5; 325 MG/1; MG/1
1 TABLET ORAL ONCE
Status: COMPLETED | OUTPATIENT
Start: 2019-10-25 | End: 2019-10-25

## 2019-10-25 RX ADMIN — HYDROCODONE BITARTRATE AND ACETAMINOPHEN 1 TABLET: 5; 325 TABLET ORAL at 19:58

## 2019-10-25 RX ADMIN — ONDANSETRON HYDROCHLORIDE 4 MG: 4 TABLET, FILM COATED ORAL at 20:44

## 2019-10-25 ASSESSMENT — PAIN SCALES - GENERAL
PAINLEVEL_OUTOF10: 8
PAINLEVEL_OUTOF10: 8
PAINLEVEL_OUTOF10: 5

## 2019-10-25 ASSESSMENT — ENCOUNTER SYMPTOMS
CONSTIPATION: 0
EYE PAIN: 0
PHOTOPHOBIA: 1
SCALP TENDERNESS: 1
RHINORRHEA: 0
ABDOMINAL PAIN: 0
DIARRHEA: 0
SINUS PAIN: 0
NAUSEA: 0
TROUBLE SWALLOWING: 0
CHEST TIGHTNESS: 0
SINUS PRESSURE: 0
SORE THROAT: 0
VOMITING: 0
BACK PAIN: 0
WHEEZING: 0
COLOR CHANGE: 0
COUGH: 0
SHORTNESS OF BREATH: 0

## 2019-10-25 ASSESSMENT — PATIENT HEALTH QUESTIONNAIRE - PHQ9
SUM OF ALL RESPONSES TO PHQ9 QUESTIONS 1 & 2: 0
SUM OF ALL RESPONSES TO PHQ QUESTIONS 1-9: 0
1. LITTLE INTEREST OR PLEASURE IN DOING THINGS: 0
2. FEELING DOWN, DEPRESSED OR HOPELESS: 0
SUM OF ALL RESPONSES TO PHQ QUESTIONS 1-9: 0

## 2019-10-25 ASSESSMENT — PAIN DESCRIPTION - FREQUENCY: FREQUENCY: CONTINUOUS

## 2019-10-25 ASSESSMENT — PAIN - FUNCTIONAL ASSESSMENT: PAIN_FUNCTIONAL_ASSESSMENT: 0-10

## 2019-10-25 ASSESSMENT — PAIN DESCRIPTION - LOCATION: LOCATION: FLANK;ABDOMEN

## 2019-10-25 ASSESSMENT — PAIN DESCRIPTION - DESCRIPTORS: DESCRIPTORS: PRESSURE

## 2019-10-25 ASSESSMENT — PAIN DESCRIPTION - ORIENTATION: ORIENTATION: RIGHT;LEFT;LOWER;MID

## 2019-10-26 LAB
ESTIMATED AVERAGE GLUCOSE: 119.8 MG/DL
HBA1C MFR BLD: 5.8 %

## 2019-10-27 ENCOUNTER — APPOINTMENT (OUTPATIENT)
Dept: CT IMAGING | Age: 39
DRG: 720 | End: 2019-10-27
Payer: COMMERCIAL

## 2019-10-27 ENCOUNTER — HOSPITAL ENCOUNTER (INPATIENT)
Age: 39
LOS: 4 days | Discharge: HOME | DRG: 720 | End: 2019-10-31
Attending: EMERGENCY MEDICINE | Admitting: INTERNAL MEDICINE
Payer: COMMERCIAL

## 2019-10-27 DIAGNOSIS — N28.89 URETERITIS: Primary | ICD-10-CM

## 2019-10-27 DIAGNOSIS — N20.0 KIDNEY STONE: ICD-10-CM

## 2019-10-27 DIAGNOSIS — R11.2 NAUSEA AND VOMITING, INTRACTABILITY OF VOMITING NOT SPECIFIED, UNSPECIFIED VOMITING TYPE: ICD-10-CM

## 2019-10-27 DIAGNOSIS — R50.9 FEVER, UNSPECIFIED FEVER CAUSE: ICD-10-CM

## 2019-10-27 DIAGNOSIS — R10.30 LOWER ABDOMINAL PAIN: ICD-10-CM

## 2019-10-27 LAB
A/G RATIO: 1 (ref 1.1–2.2)
ALBUMIN SERPL-MCNC: 3.8 G/DL (ref 3.4–5)
ALP BLD-CCNC: 86 U/L (ref 40–129)
ALT SERPL-CCNC: 19 U/L (ref 10–40)
ANION GAP SERPL CALCULATED.3IONS-SCNC: 17 MMOL/L (ref 3–16)
AST SERPL-CCNC: 16 U/L (ref 15–37)
BASOPHILS ABSOLUTE: 0.1 K/UL (ref 0–0.2)
BASOPHILS RELATIVE PERCENT: 0.4 %
BILIRUB SERPL-MCNC: 0.3 MG/DL (ref 0–1)
BILIRUBIN URINE: NEGATIVE
BLOOD, URINE: NEGATIVE
BUN BLDV-MCNC: 14 MG/DL (ref 7–20)
CALCIUM SERPL-MCNC: 9.3 MG/DL (ref 8.3–10.6)
CHLORIDE BLD-SCNC: 99 MMOL/L (ref 99–110)
CLARITY: CLEAR
CO2: 21 MMOL/L (ref 21–32)
COLOR: YELLOW
CREAT SERPL-MCNC: 0.7 MG/DL (ref 0.6–1.1)
EOSINOPHILS ABSOLUTE: 0.1 K/UL (ref 0–0.6)
EOSINOPHILS RELATIVE PERCENT: 0.5 %
GFR AFRICAN AMERICAN: >60
GFR NON-AFRICAN AMERICAN: >60
GLOBULIN: 4 G/DL
GLUCOSE BLD-MCNC: 139 MG/DL (ref 70–99)
GLUCOSE URINE: NEGATIVE MG/DL
HCG QUALITATIVE: NEGATIVE
HCT VFR BLD CALC: 43.9 % (ref 36–48)
HEMOGLOBIN: 15.3 G/DL (ref 12–16)
KETONES, URINE: NEGATIVE MG/DL
LACTIC ACID: 2.1 MMOL/L (ref 0.4–2)
LEUKOCYTE ESTERASE, URINE: NEGATIVE
LIPASE: 13 U/L (ref 13–60)
LYMPHOCYTES ABSOLUTE: 2.4 K/UL (ref 1–5.1)
LYMPHOCYTES RELATIVE PERCENT: 17.4 %
MCH RBC QN AUTO: 30.3 PG (ref 26–34)
MCHC RBC AUTO-ENTMCNC: 34.8 G/DL (ref 31–36)
MCV RBC AUTO: 87.1 FL (ref 80–100)
MICROSCOPIC EXAMINATION: NORMAL
MONOCYTES ABSOLUTE: 0.1 K/UL (ref 0–1.3)
MONOCYTES RELATIVE PERCENT: 0.7 %
NEUTROPHILS ABSOLUTE: 11.1 K/UL (ref 1.7–7.7)
NEUTROPHILS RELATIVE PERCENT: 81 %
NITRITE, URINE: NEGATIVE
PDW BLD-RTO: 13.7 % (ref 12.4–15.4)
PH UA: 6.5 (ref 5–8)
PLATELET # BLD: 271 K/UL (ref 135–450)
PMV BLD AUTO: 7.8 FL (ref 5–10.5)
POTASSIUM REFLEX MAGNESIUM: 4.3 MMOL/L (ref 3.5–5.1)
PROTEIN UA: NEGATIVE MG/DL
RBC # BLD: 5.04 M/UL (ref 4–5.2)
SODIUM BLD-SCNC: 137 MMOL/L (ref 136–145)
SPECIFIC GRAVITY UA: 1.01 (ref 1–1.03)
TOTAL PROTEIN: 7.8 G/DL (ref 6.4–8.2)
URINE CULTURE, ROUTINE: NORMAL
URINE REFLEX TO CULTURE: NORMAL
URINE TYPE: NORMAL
UROBILINOGEN, URINE: 0.2 E.U./DL
WBC # BLD: 13.6 K/UL (ref 4–11)

## 2019-10-27 PROCEDURE — 99285 EMERGENCY DEPT VISIT HI MDM: CPT

## 2019-10-27 PROCEDURE — 84703 CHORIONIC GONADOTROPIN ASSAY: CPT

## 2019-10-27 PROCEDURE — 6370000000 HC RX 637 (ALT 250 FOR IP): Performed by: INTERNAL MEDICINE

## 2019-10-27 PROCEDURE — 74176 CT ABD & PELVIS W/O CONTRAST: CPT

## 2019-10-27 PROCEDURE — 96374 THER/PROPH/DIAG INJ IV PUSH: CPT

## 2019-10-27 PROCEDURE — 83605 ASSAY OF LACTIC ACID: CPT

## 2019-10-27 PROCEDURE — 6360000002 HC RX W HCPCS: Performed by: NURSE PRACTITIONER

## 2019-10-27 PROCEDURE — 96375 TX/PRO/DX INJ NEW DRUG ADDON: CPT

## 2019-10-27 PROCEDURE — 96361 HYDRATE IV INFUSION ADD-ON: CPT

## 2019-10-27 PROCEDURE — 2580000003 HC RX 258: Performed by: INTERNAL MEDICINE

## 2019-10-27 PROCEDURE — 81003 URINALYSIS AUTO W/O SCOPE: CPT

## 2019-10-27 PROCEDURE — 85025 COMPLETE CBC W/AUTO DIFF WBC: CPT

## 2019-10-27 PROCEDURE — 87040 BLOOD CULTURE FOR BACTERIA: CPT

## 2019-10-27 PROCEDURE — 80053 COMPREHEN METABOLIC PANEL: CPT

## 2019-10-27 PROCEDURE — 36415 COLL VENOUS BLD VENIPUNCTURE: CPT

## 2019-10-27 PROCEDURE — 6370000000 HC RX 637 (ALT 250 FOR IP): Performed by: NURSE PRACTITIONER

## 2019-10-27 PROCEDURE — 6360000002 HC RX W HCPCS: Performed by: INTERNAL MEDICINE

## 2019-10-27 PROCEDURE — 1200000000 HC SEMI PRIVATE

## 2019-10-27 PROCEDURE — 2580000003 HC RX 258: Performed by: NURSE PRACTITIONER

## 2019-10-27 PROCEDURE — 83690 ASSAY OF LIPASE: CPT

## 2019-10-27 RX ORDER — ONDANSETRON 2 MG/ML
4 INJECTION INTRAMUSCULAR; INTRAVENOUS ONCE
Status: COMPLETED | OUTPATIENT
Start: 2019-10-27 | End: 2019-10-27

## 2019-10-27 RX ORDER — ONDANSETRON 2 MG/ML
4 INJECTION INTRAMUSCULAR; INTRAVENOUS EVERY 6 HOURS PRN
Status: DISCONTINUED | OUTPATIENT
Start: 2019-10-27 | End: 2019-10-31 | Stop reason: HOSPADM

## 2019-10-27 RX ORDER — HYDROCODONE BITARTRATE AND ACETAMINOPHEN 5; 325 MG/1; MG/1
1 TABLET ORAL EVERY 6 HOURS PRN
Status: DISCONTINUED | OUTPATIENT
Start: 2019-10-27 | End: 2019-10-27

## 2019-10-27 RX ORDER — SODIUM CHLORIDE 9 MG/ML
INJECTION, SOLUTION INTRAVENOUS CONTINUOUS
Status: DISCONTINUED | OUTPATIENT
Start: 2019-10-27 | End: 2019-10-31 | Stop reason: HOSPADM

## 2019-10-27 RX ORDER — KETOROLAC TROMETHAMINE 30 MG/ML
30 INJECTION, SOLUTION INTRAMUSCULAR; INTRAVENOUS EVERY 6 HOURS PRN
Status: DISCONTINUED | OUTPATIENT
Start: 2019-10-27 | End: 2019-10-27

## 2019-10-27 RX ORDER — SODIUM CHLORIDE 0.9 % (FLUSH) 0.9 %
10 SYRINGE (ML) INJECTION PRN
Status: DISCONTINUED | OUTPATIENT
Start: 2019-10-27 | End: 2019-10-31 | Stop reason: HOSPADM

## 2019-10-27 RX ORDER — ACETAMINOPHEN 325 MG/1
650 TABLET ORAL EVERY 4 HOURS PRN
Status: DISCONTINUED | OUTPATIENT
Start: 2019-10-27 | End: 2019-10-31 | Stop reason: HOSPADM

## 2019-10-27 RX ORDER — SODIUM CHLORIDE 0.9 % (FLUSH) 0.9 %
10 SYRINGE (ML) INJECTION EVERY 12 HOURS SCHEDULED
Status: DISCONTINUED | OUTPATIENT
Start: 2019-10-27 | End: 2019-10-31 | Stop reason: HOSPADM

## 2019-10-27 RX ORDER — HYDROCODONE BITARTRATE AND ACETAMINOPHEN 5; 325 MG/1; MG/1
2 TABLET ORAL EVERY 6 HOURS PRN
Status: DISCONTINUED | OUTPATIENT
Start: 2019-10-27 | End: 2019-10-31 | Stop reason: HOSPADM

## 2019-10-27 RX ORDER — 0.9 % SODIUM CHLORIDE 0.9 %
1000 INTRAVENOUS SOLUTION INTRAVENOUS ONCE
Status: COMPLETED | OUTPATIENT
Start: 2019-10-27 | End: 2019-10-27

## 2019-10-27 RX ORDER — CIPROFLOXACIN 2 MG/ML
400 INJECTION, SOLUTION INTRAVENOUS ONCE
Status: DISCONTINUED | OUTPATIENT
Start: 2019-10-27 | End: 2019-10-27

## 2019-10-27 RX ORDER — MORPHINE SULFATE 2 MG/ML
2 INJECTION, SOLUTION INTRAMUSCULAR; INTRAVENOUS ONCE
Status: COMPLETED | OUTPATIENT
Start: 2019-10-27 | End: 2019-10-27

## 2019-10-27 RX ORDER — DIPHENHYDRAMINE HCL 25 MG
25 TABLET ORAL EVERY 6 HOURS PRN
Status: DISCONTINUED | OUTPATIENT
Start: 2019-10-27 | End: 2019-10-31 | Stop reason: HOSPADM

## 2019-10-27 RX ADMIN — ONDANSETRON 4 MG: 2 INJECTION INTRAMUSCULAR; INTRAVENOUS at 16:25

## 2019-10-27 RX ADMIN — HYDROCODONE BITARTRATE AND ACETAMINOPHEN 1 TABLET: 5; 325 TABLET ORAL at 20:20

## 2019-10-27 RX ADMIN — ONDANSETRON 4 MG: 2 INJECTION INTRAMUSCULAR; INTRAVENOUS at 20:20

## 2019-10-27 RX ADMIN — PIPERACILLIN AND TAZOBACTAM 3.38 G: 3; .375 INJECTION, POWDER, LYOPHILIZED, FOR SOLUTION INTRAVENOUS at 20:21

## 2019-10-27 RX ADMIN — HYDROCODONE BITARTRATE AND ACETAMINOPHEN 1 TABLET: 5; 325 TABLET ORAL at 19:04

## 2019-10-27 RX ADMIN — SODIUM CHLORIDE: 9 INJECTION, SOLUTION INTRAVENOUS at 20:21

## 2019-10-27 RX ADMIN — SODIUM CHLORIDE, PRESERVATIVE FREE 10 ML: 5 INJECTION INTRAVENOUS at 22:00

## 2019-10-27 RX ADMIN — SODIUM CHLORIDE 1000 ML: 9 INJECTION, SOLUTION INTRAVENOUS at 16:25

## 2019-10-27 RX ADMIN — MORPHINE SULFATE 2 MG: 2 INJECTION, SOLUTION INTRAMUSCULAR; INTRAVENOUS at 16:25

## 2019-10-27 ASSESSMENT — PAIN DESCRIPTION - FREQUENCY
FREQUENCY: CONTINUOUS

## 2019-10-27 ASSESSMENT — PAIN SCALES - WONG BAKER: WONGBAKER_NUMERICALRESPONSE: 8

## 2019-10-27 ASSESSMENT — PAIN DESCRIPTION - LOCATION
LOCATION: ABDOMEN;BACK
LOCATION: ABDOMEN;BACK
LOCATION: ABDOMEN
LOCATION: ABDOMEN;BACK
LOCATION: BACK;ABDOMEN

## 2019-10-27 ASSESSMENT — PAIN DESCRIPTION - DESCRIPTORS
DESCRIPTORS: SPASM;SHARP
DESCRIPTORS: SHARP

## 2019-10-27 ASSESSMENT — PAIN SCALES - GENERAL
PAINLEVEL_OUTOF10: 4
PAINLEVEL_OUTOF10: 8
PAINLEVEL_OUTOF10: 8
PAINLEVEL_OUTOF10: 4
PAINLEVEL_OUTOF10: 9
PAINLEVEL_OUTOF10: 6
PAINLEVEL_OUTOF10: 5

## 2019-10-27 ASSESSMENT — PAIN DESCRIPTION - ONSET
ONSET: PROGRESSIVE
ONSET: ON-GOING

## 2019-10-27 ASSESSMENT — ENCOUNTER SYMPTOMS
ABDOMINAL PAIN: 1
BACK PAIN: 1
VOMITING: 1
NAUSEA: 1
COUGH: 0

## 2019-10-27 ASSESSMENT — PAIN DESCRIPTION - PROGRESSION
CLINICAL_PROGRESSION: GRADUALLY WORSENING
CLINICAL_PROGRESSION: NOT CHANGED

## 2019-10-27 ASSESSMENT — PAIN - FUNCTIONAL ASSESSMENT
PAIN_FUNCTIONAL_ASSESSMENT: PREVENTS OR INTERFERES SOME ACTIVE ACTIVITIES AND ADLS
PAIN_FUNCTIONAL_ASSESSMENT: ACTIVITIES ARE NOT PREVENTED

## 2019-10-27 ASSESSMENT — PAIN DESCRIPTION - PAIN TYPE
TYPE: ACUTE PAIN

## 2019-10-27 ASSESSMENT — PAIN DESCRIPTION - ORIENTATION
ORIENTATION: LOWER

## 2019-10-28 LAB — PROCALCITONIN: 0.07 NG/ML (ref 0–0.15)

## 2019-10-28 PROCEDURE — 6370000000 HC RX 637 (ALT 250 FOR IP): Performed by: NURSE PRACTITIONER

## 2019-10-28 PROCEDURE — 6360000002 HC RX W HCPCS: Performed by: HOSPITALIST

## 2019-10-28 PROCEDURE — 2580000003 HC RX 258: Performed by: INTERNAL MEDICINE

## 2019-10-28 PROCEDURE — 84145 PROCALCITONIN (PCT): CPT

## 2019-10-28 PROCEDURE — 1200000000 HC SEMI PRIVATE

## 2019-10-28 PROCEDURE — 6370000000 HC RX 637 (ALT 250 FOR IP): Performed by: HOSPITALIST

## 2019-10-28 PROCEDURE — 6360000002 HC RX W HCPCS: Performed by: NURSE PRACTITIONER

## 2019-10-28 PROCEDURE — 36415 COLL VENOUS BLD VENIPUNCTURE: CPT

## 2019-10-28 PROCEDURE — 6360000002 HC RX W HCPCS: Performed by: INTERNAL MEDICINE

## 2019-10-28 RX ORDER — DIAPER,BRIEF,INFANT-TODD,DISP
EACH MISCELLANEOUS 3 TIMES DAILY
Status: DISCONTINUED | OUTPATIENT
Start: 2019-10-28 | End: 2019-10-29

## 2019-10-28 RX ORDER — MORPHINE SULFATE 4 MG/ML
4 INJECTION, SOLUTION INTRAMUSCULAR; INTRAVENOUS
Status: DISCONTINUED | OUTPATIENT
Start: 2019-10-28 | End: 2019-10-28

## 2019-10-28 RX ORDER — MORPHINE SULFATE 2 MG/ML
2 INJECTION, SOLUTION INTRAMUSCULAR; INTRAVENOUS
Status: DISCONTINUED | OUTPATIENT
Start: 2019-10-28 | End: 2019-10-28

## 2019-10-28 RX ADMIN — HYDROMORPHONE HYDROCHLORIDE 1 MG: 1 INJECTION, SOLUTION INTRAMUSCULAR; INTRAVENOUS; SUBCUTANEOUS at 21:06

## 2019-10-28 RX ADMIN — MORPHINE SULFATE 4 MG: 4 INJECTION, SOLUTION INTRAMUSCULAR; INTRAVENOUS at 00:26

## 2019-10-28 RX ADMIN — PIPERACILLIN AND TAZOBACTAM 3.38 G: 3; .375 INJECTION, POWDER, LYOPHILIZED, FOR SOLUTION INTRAVENOUS at 03:32

## 2019-10-28 RX ADMIN — SODIUM CHLORIDE: 9 INJECTION, SOLUTION INTRAVENOUS at 11:39

## 2019-10-28 RX ADMIN — DIPHENHYDRAMINE HCL 25 MG: 25 TABLET ORAL at 17:48

## 2019-10-28 RX ADMIN — PIPERACILLIN AND TAZOBACTAM 3.38 G: 3; .375 INJECTION, POWDER, LYOPHILIZED, FOR SOLUTION INTRAVENOUS at 20:04

## 2019-10-28 RX ADMIN — HYDROMORPHONE HYDROCHLORIDE 1 MG: 1 INJECTION, SOLUTION INTRAMUSCULAR; INTRAVENOUS; SUBCUTANEOUS at 14:24

## 2019-10-28 RX ADMIN — PIPERACILLIN AND TAZOBACTAM 3.38 G: 3; .375 INJECTION, POWDER, LYOPHILIZED, FOR SOLUTION INTRAVENOUS at 11:40

## 2019-10-28 RX ADMIN — HYDROCORTISONE: 1 CREAM TOPICAL at 20:28

## 2019-10-28 RX ADMIN — HYDROCODONE BITARTRATE AND ACETAMINOPHEN 2 TABLET: 5; 325 TABLET ORAL at 20:04

## 2019-10-28 RX ADMIN — MORPHINE SULFATE 2 MG: 2 INJECTION, SOLUTION INTRAMUSCULAR; INTRAVENOUS at 09:38

## 2019-10-28 RX ADMIN — ENOXAPARIN SODIUM 40 MG: 40 INJECTION SUBCUTANEOUS at 09:27

## 2019-10-28 RX ADMIN — ONDANSETRON 4 MG: 2 INJECTION INTRAMUSCULAR; INTRAVENOUS at 03:32

## 2019-10-28 RX ADMIN — DIPHENHYDRAMINE HCL 25 MG: 25 TABLET ORAL at 09:38

## 2019-10-28 RX ADMIN — HYDROCODONE BITARTRATE AND ACETAMINOPHEN 2 TABLET: 5; 325 TABLET ORAL at 11:40

## 2019-10-28 RX ADMIN — HYDROMORPHONE HYDROCHLORIDE 1 MG: 1 INJECTION, SOLUTION INTRAMUSCULAR; INTRAVENOUS; SUBCUTANEOUS at 17:48

## 2019-10-28 RX ADMIN — MORPHINE SULFATE 2 MG: 2 INJECTION, SOLUTION INTRAMUSCULAR; INTRAVENOUS at 07:35

## 2019-10-28 RX ADMIN — ONDANSETRON 4 MG: 2 INJECTION INTRAMUSCULAR; INTRAVENOUS at 14:18

## 2019-10-28 RX ADMIN — MORPHINE SULFATE 4 MG: 4 INJECTION, SOLUTION INTRAMUSCULAR; INTRAVENOUS at 12:48

## 2019-10-28 RX ADMIN — MORPHINE SULFATE 2 MG: 2 INJECTION, SOLUTION INTRAMUSCULAR; INTRAVENOUS at 03:33

## 2019-10-28 ASSESSMENT — PAIN DESCRIPTION - LOCATION
LOCATION: ABDOMEN;BACK
LOCATION: ABDOMEN
LOCATION: ABDOMEN;BACK

## 2019-10-28 ASSESSMENT — PAIN SCALES - GENERAL
PAINLEVEL_OUTOF10: 0
PAINLEVEL_OUTOF10: 9
PAINLEVEL_OUTOF10: 8
PAINLEVEL_OUTOF10: 9
PAINLEVEL_OUTOF10: 5
PAINLEVEL_OUTOF10: 5
PAINLEVEL_OUTOF10: 9
PAINLEVEL_OUTOF10: 8
PAINLEVEL_OUTOF10: 5
PAINLEVEL_OUTOF10: 8
PAINLEVEL_OUTOF10: 7
PAINLEVEL_OUTOF10: 0
PAINLEVEL_OUTOF10: 9
PAINLEVEL_OUTOF10: 3
PAINLEVEL_OUTOF10: 9
PAINLEVEL_OUTOF10: 8
PAINLEVEL_OUTOF10: 6
PAINLEVEL_OUTOF10: 7
PAINLEVEL_OUTOF10: 6

## 2019-10-28 ASSESSMENT — PAIN DESCRIPTION - PROGRESSION
CLINICAL_PROGRESSION: NOT CHANGED

## 2019-10-28 ASSESSMENT — PAIN DESCRIPTION - PAIN TYPE
TYPE: ACUTE PAIN

## 2019-10-28 ASSESSMENT — PAIN DESCRIPTION - FREQUENCY
FREQUENCY: CONTINUOUS

## 2019-10-28 ASSESSMENT — PAIN DESCRIPTION - ONSET
ONSET: ON-GOING

## 2019-10-28 ASSESSMENT — PAIN DESCRIPTION - DESCRIPTORS
DESCRIPTORS: SHARP
DESCRIPTORS: CONSTANT
DESCRIPTORS: SHARP
DESCRIPTORS: CONSTANT
DESCRIPTORS: CONSTANT
DESCRIPTORS: SHARP

## 2019-10-28 ASSESSMENT — PAIN DESCRIPTION - ORIENTATION
ORIENTATION: LOWER
ORIENTATION: RIGHT;LOWER
ORIENTATION: RIGHT;LEFT
ORIENTATION: LOWER
ORIENTATION: RIGHT;LOWER

## 2019-10-28 ASSESSMENT — PAIN DESCRIPTION - DIRECTION
RADIATING_TOWARDS: RIGHT FLANK
RADIATING_TOWARDS: RIGHT FLANK
RADIATING_TOWARDS: FLANK
RADIATING_TOWARDS: FLANK

## 2019-10-28 ASSESSMENT — PAIN - FUNCTIONAL ASSESSMENT
PAIN_FUNCTIONAL_ASSESSMENT: ACTIVITIES ARE NOT PREVENTED
PAIN_FUNCTIONAL_ASSESSMENT: PREVENTS OR INTERFERES SOME ACTIVE ACTIVITIES AND ADLS
PAIN_FUNCTIONAL_ASSESSMENT: ACTIVITIES ARE NOT PREVENTED

## 2019-10-29 LAB
ANION GAP SERPL CALCULATED.3IONS-SCNC: 13 MMOL/L (ref 3–16)
BUN BLDV-MCNC: 17 MG/DL (ref 7–20)
CALCIUM SERPL-MCNC: 8.5 MG/DL (ref 8.3–10.6)
CHLORIDE BLD-SCNC: 104 MMOL/L (ref 99–110)
CO2: 23 MMOL/L (ref 21–32)
CREAT SERPL-MCNC: 0.8 MG/DL (ref 0.6–1.1)
GFR AFRICAN AMERICAN: >60
GFR NON-AFRICAN AMERICAN: >60
GLUCOSE BLD-MCNC: 106 MG/DL (ref 70–99)
HCT VFR BLD CALC: 36.6 % (ref 36–48)
HEMOGLOBIN: 12.4 G/DL (ref 12–16)
MCH RBC QN AUTO: 30 PG (ref 26–34)
MCHC RBC AUTO-ENTMCNC: 33.8 G/DL (ref 31–36)
MCV RBC AUTO: 88.7 FL (ref 80–100)
PDW BLD-RTO: 13.7 % (ref 12.4–15.4)
PLATELET # BLD: 212 K/UL (ref 135–450)
PMV BLD AUTO: 7.7 FL (ref 5–10.5)
POTASSIUM SERPL-SCNC: 3.9 MMOL/L (ref 3.5–5.1)
RBC # BLD: 4.13 M/UL (ref 4–5.2)
SODIUM BLD-SCNC: 140 MMOL/L (ref 136–145)
WBC # BLD: 7 K/UL (ref 4–11)

## 2019-10-29 PROCEDURE — 99223 1ST HOSP IP/OBS HIGH 75: CPT | Performed by: INTERNAL MEDICINE

## 2019-10-29 PROCEDURE — 85027 COMPLETE CBC AUTOMATED: CPT

## 2019-10-29 PROCEDURE — 36415 COLL VENOUS BLD VENIPUNCTURE: CPT

## 2019-10-29 PROCEDURE — 1200000000 HC SEMI PRIVATE

## 2019-10-29 PROCEDURE — 6360000002 HC RX W HCPCS: Performed by: HOSPITALIST

## 2019-10-29 PROCEDURE — 6370000000 HC RX 637 (ALT 250 FOR IP): Performed by: NURSE PRACTITIONER

## 2019-10-29 PROCEDURE — 6370000000 HC RX 637 (ALT 250 FOR IP): Performed by: HOSPITALIST

## 2019-10-29 PROCEDURE — 6360000002 HC RX W HCPCS: Performed by: INTERNAL MEDICINE

## 2019-10-29 PROCEDURE — 2580000003 HC RX 258: Performed by: INTERNAL MEDICINE

## 2019-10-29 PROCEDURE — 80048 BASIC METABOLIC PNL TOTAL CA: CPT

## 2019-10-29 RX ADMIN — HYDROCORTISONE: 1 CREAM TOPICAL at 08:28

## 2019-10-29 RX ADMIN — SODIUM CHLORIDE: 9 INJECTION, SOLUTION INTRAVENOUS at 00:13

## 2019-10-29 RX ADMIN — HYDROCODONE BITARTRATE AND ACETAMINOPHEN 2 TABLET: 5; 325 TABLET ORAL at 18:52

## 2019-10-29 RX ADMIN — HYDROMORPHONE HYDROCHLORIDE 1 MG: 1 INJECTION, SOLUTION INTRAMUSCULAR; INTRAVENOUS; SUBCUTANEOUS at 20:05

## 2019-10-29 RX ADMIN — HYDROMORPHONE HYDROCHLORIDE 1 MG: 1 INJECTION, SOLUTION INTRAMUSCULAR; INTRAVENOUS; SUBCUTANEOUS at 00:07

## 2019-10-29 RX ADMIN — HYDROMORPHONE HYDROCHLORIDE 1 MG: 1 INJECTION, SOLUTION INTRAMUSCULAR; INTRAVENOUS; SUBCUTANEOUS at 23:02

## 2019-10-29 RX ADMIN — HYDROMORPHONE HYDROCHLORIDE 1 MG: 1 INJECTION, SOLUTION INTRAMUSCULAR; INTRAVENOUS; SUBCUTANEOUS at 03:08

## 2019-10-29 RX ADMIN — ONDANSETRON 4 MG: 2 INJECTION INTRAMUSCULAR; INTRAVENOUS at 06:17

## 2019-10-29 RX ADMIN — DIPHENHYDRAMINE HCL 25 MG: 25 TABLET ORAL at 08:28

## 2019-10-29 RX ADMIN — PIPERACILLIN AND TAZOBACTAM 3.38 G: 3; .375 INJECTION, POWDER, LYOPHILIZED, FOR SOLUTION INTRAVENOUS at 04:31

## 2019-10-29 RX ADMIN — HYDROCORTISONE: 25 CREAM TOPICAL at 20:37

## 2019-10-29 RX ADMIN — ENOXAPARIN SODIUM 40 MG: 40 INJECTION SUBCUTANEOUS at 08:28

## 2019-10-29 RX ADMIN — HYDROCODONE BITARTRATE AND ACETAMINOPHEN 2 TABLET: 5; 325 TABLET ORAL at 02:10

## 2019-10-29 RX ADMIN — HYDROMORPHONE HYDROCHLORIDE 1 MG: 1 INJECTION, SOLUTION INTRAMUSCULAR; INTRAVENOUS; SUBCUTANEOUS at 16:40

## 2019-10-29 RX ADMIN — ONDANSETRON 4 MG: 2 INJECTION INTRAMUSCULAR; INTRAVENOUS at 00:07

## 2019-10-29 RX ADMIN — PIPERACILLIN AND TAZOBACTAM 3.38 G: 3; .375 INJECTION, POWDER, LYOPHILIZED, FOR SOLUTION INTRAVENOUS at 20:05

## 2019-10-29 RX ADMIN — HYDROMORPHONE HYDROCHLORIDE 1 MG: 1 INJECTION, SOLUTION INTRAMUSCULAR; INTRAVENOUS; SUBCUTANEOUS at 13:12

## 2019-10-29 RX ADMIN — HYDROMORPHONE HYDROCHLORIDE 1 MG: 1 INJECTION, SOLUTION INTRAMUSCULAR; INTRAVENOUS; SUBCUTANEOUS at 06:17

## 2019-10-29 RX ADMIN — ONDANSETRON 4 MG: 2 INJECTION INTRAMUSCULAR; INTRAVENOUS at 16:44

## 2019-10-29 RX ADMIN — PIPERACILLIN AND TAZOBACTAM 3.38 G: 3; .375 INJECTION, POWDER, LYOPHILIZED, FOR SOLUTION INTRAVENOUS at 11:50

## 2019-10-29 RX ADMIN — HYDROCODONE BITARTRATE AND ACETAMINOPHEN 2 TABLET: 5; 325 TABLET ORAL at 08:28

## 2019-10-29 RX ADMIN — HYDROCORTISONE: 25 CREAM TOPICAL at 13:29

## 2019-10-29 RX ADMIN — HYDROMORPHONE HYDROCHLORIDE 1 MG: 1 INJECTION, SOLUTION INTRAMUSCULAR; INTRAVENOUS; SUBCUTANEOUS at 10:06

## 2019-10-29 RX ADMIN — ONDANSETRON 4 MG: 2 INJECTION INTRAMUSCULAR; INTRAVENOUS at 23:02

## 2019-10-29 ASSESSMENT — PAIN DESCRIPTION - DESCRIPTORS
DESCRIPTORS: CONSTANT

## 2019-10-29 ASSESSMENT — PAIN DESCRIPTION - FREQUENCY
FREQUENCY: CONTINUOUS

## 2019-10-29 ASSESSMENT — PAIN - FUNCTIONAL ASSESSMENT
PAIN_FUNCTIONAL_ASSESSMENT: ACTIVITIES ARE NOT PREVENTED
PAIN_FUNCTIONAL_ASSESSMENT: ACTIVITIES ARE NOT PREVENTED

## 2019-10-29 ASSESSMENT — PAIN DESCRIPTION - PAIN TYPE
TYPE: ACUTE PAIN

## 2019-10-29 ASSESSMENT — PAIN DESCRIPTION - DIRECTION: RADIATING_TOWARDS: BACK

## 2019-10-29 ASSESSMENT — PAIN DESCRIPTION - ONSET
ONSET: ON-GOING

## 2019-10-29 ASSESSMENT — PAIN DESCRIPTION - LOCATION
LOCATION: ABDOMEN;BACK
LOCATION: ABDOMEN
LOCATION: ABDOMEN;BACK
LOCATION: BACK;ABDOMEN
LOCATION: ABDOMEN;BACK
LOCATION: ABDOMEN;BACK
LOCATION: ABDOMEN
LOCATION: ABDOMEN;BACK

## 2019-10-29 ASSESSMENT — PAIN DESCRIPTION - PROGRESSION

## 2019-10-29 ASSESSMENT — PAIN SCALES - GENERAL
PAINLEVEL_OUTOF10: 9
PAINLEVEL_OUTOF10: 7
PAINLEVEL_OUTOF10: 8
PAINLEVEL_OUTOF10: 5
PAINLEVEL_OUTOF10: 6
PAINLEVEL_OUTOF10: 7
PAINLEVEL_OUTOF10: 9
PAINLEVEL_OUTOF10: 7
PAINLEVEL_OUTOF10: 8
PAINLEVEL_OUTOF10: 9
PAINLEVEL_OUTOF10: 7
PAINLEVEL_OUTOF10: 8
PAINLEVEL_OUTOF10: 7
PAINLEVEL_OUTOF10: 8
PAINLEVEL_OUTOF10: 7

## 2019-10-29 ASSESSMENT — PAIN DESCRIPTION - ORIENTATION
ORIENTATION: RIGHT;LEFT
ORIENTATION: LEFT
ORIENTATION: RIGHT;LEFT
ORIENTATION: LEFT
ORIENTATION: RIGHT;LEFT

## 2019-10-30 PROCEDURE — 6360000002 HC RX W HCPCS: Performed by: HOSPITALIST

## 2019-10-30 PROCEDURE — 2580000003 HC RX 258: Performed by: INTERNAL MEDICINE

## 2019-10-30 PROCEDURE — 6360000002 HC RX W HCPCS: Performed by: INTERNAL MEDICINE

## 2019-10-30 PROCEDURE — 99232 SBSQ HOSP IP/OBS MODERATE 35: CPT | Performed by: INTERNAL MEDICINE

## 2019-10-30 PROCEDURE — 6370000000 HC RX 637 (ALT 250 FOR IP): Performed by: NURSE PRACTITIONER

## 2019-10-30 PROCEDURE — 94760 N-INVAS EAR/PLS OXIMETRY 1: CPT

## 2019-10-30 PROCEDURE — 1200000000 HC SEMI PRIVATE

## 2019-10-30 RX ORDER — PROMETHAZINE HYDROCHLORIDE 25 MG/ML
25 INJECTION, SOLUTION INTRAMUSCULAR; INTRAVENOUS EVERY 4 HOURS PRN
Status: DISCONTINUED | OUTPATIENT
Start: 2019-10-30 | End: 2019-10-30

## 2019-10-30 RX ADMIN — HYDROMORPHONE HYDROCHLORIDE 1 MG: 1 INJECTION, SOLUTION INTRAMUSCULAR; INTRAVENOUS; SUBCUTANEOUS at 02:05

## 2019-10-30 RX ADMIN — HYDROCODONE BITARTRATE AND ACETAMINOPHEN 2 TABLET: 5; 325 TABLET ORAL at 08:32

## 2019-10-30 RX ADMIN — PIPERACILLIN AND TAZOBACTAM 3.38 G: 3; .375 INJECTION, POWDER, LYOPHILIZED, FOR SOLUTION INTRAVENOUS at 05:08

## 2019-10-30 RX ADMIN — HYDROCODONE BITARTRATE AND ACETAMINOPHEN 2 TABLET: 5; 325 TABLET ORAL at 14:22

## 2019-10-30 RX ADMIN — HYDROCORTISONE: 25 CREAM TOPICAL at 14:23

## 2019-10-30 RX ADMIN — HYDROMORPHONE HYDROCHLORIDE 1 MG: 1 INJECTION, SOLUTION INTRAMUSCULAR; INTRAVENOUS; SUBCUTANEOUS at 05:08

## 2019-10-30 RX ADMIN — SODIUM CHLORIDE, PRESERVATIVE FREE 10 ML: 5 INJECTION INTRAVENOUS at 21:16

## 2019-10-30 RX ADMIN — ONDANSETRON 4 MG: 2 INJECTION INTRAMUSCULAR; INTRAVENOUS at 11:04

## 2019-10-30 RX ADMIN — ONDANSETRON 4 MG: 2 INJECTION INTRAMUSCULAR; INTRAVENOUS at 05:07

## 2019-10-30 RX ADMIN — HYDROCORTISONE: 25 CREAM TOPICAL at 08:32

## 2019-10-30 RX ADMIN — PIPERACILLIN AND TAZOBACTAM 3.38 G: 3; .375 INJECTION, POWDER, LYOPHILIZED, FOR SOLUTION INTRAVENOUS at 23:12

## 2019-10-30 RX ADMIN — Medication 25 MG: at 22:20

## 2019-10-30 RX ADMIN — Medication 25 MG: at 14:22

## 2019-10-30 RX ADMIN — SODIUM CHLORIDE: 9 INJECTION, SOLUTION INTRAVENOUS at 02:06

## 2019-10-30 RX ADMIN — HYDROCODONE BITARTRATE AND ACETAMINOPHEN 2 TABLET: 5; 325 TABLET ORAL at 19:36

## 2019-10-30 ASSESSMENT — PAIN DESCRIPTION - DESCRIPTORS
DESCRIPTORS: CONSTANT
DESCRIPTORS: CONSTANT
DESCRIPTORS: ACHING;DISCOMFORT
DESCRIPTORS: CONSTANT
DESCRIPTORS: ACHING;DISCOMFORT
DESCRIPTORS: CONSTANT
DESCRIPTORS: ACHING;DISCOMFORT

## 2019-10-30 ASSESSMENT — PAIN DESCRIPTION - PAIN TYPE
TYPE: ACUTE PAIN

## 2019-10-30 ASSESSMENT — PAIN DESCRIPTION - ORIENTATION
ORIENTATION: LEFT

## 2019-10-30 ASSESSMENT — PAIN SCALES - GENERAL
PAINLEVEL_OUTOF10: 7
PAINLEVEL_OUTOF10: 8
PAINLEVEL_OUTOF10: 7
PAINLEVEL_OUTOF10: 8
PAINLEVEL_OUTOF10: 8
PAINLEVEL_OUTOF10: 9
PAINLEVEL_OUTOF10: 8
PAINLEVEL_OUTOF10: 8
PAINLEVEL_OUTOF10: 7
PAINLEVEL_OUTOF10: 7

## 2019-10-30 ASSESSMENT — PAIN DESCRIPTION - LOCATION
LOCATION: ABDOMEN

## 2019-10-30 ASSESSMENT — PAIN DESCRIPTION - ONSET
ONSET: ON-GOING

## 2019-10-30 ASSESSMENT — PAIN DESCRIPTION - PROGRESSION

## 2019-10-30 ASSESSMENT — PAIN DESCRIPTION - FREQUENCY
FREQUENCY: CONTINUOUS

## 2019-10-30 ASSESSMENT — PAIN SCALES - WONG BAKER
WONGBAKER_NUMERICALRESPONSE: 6
WONGBAKER_NUMERICALRESPONSE: 6

## 2019-10-31 VITALS
SYSTOLIC BLOOD PRESSURE: 114 MMHG | WEIGHT: 159.61 LBS | HEART RATE: 68 BPM | HEIGHT: 59 IN | BODY MASS INDEX: 32.18 KG/M2 | RESPIRATION RATE: 18 BRPM | OXYGEN SATURATION: 94 % | TEMPERATURE: 98.7 F | DIASTOLIC BLOOD PRESSURE: 77 MMHG

## 2019-10-31 LAB
A/G RATIO: 1.2 (ref 1.1–2.2)
ALBUMIN SERPL-MCNC: 3.5 G/DL (ref 3.4–5)
ALP BLD-CCNC: 101 U/L (ref 40–129)
ALT SERPL-CCNC: 242 U/L (ref 10–40)
ANION GAP SERPL CALCULATED.3IONS-SCNC: 13 MMOL/L (ref 3–16)
AST SERPL-CCNC: 60 U/L (ref 15–37)
BILIRUB SERPL-MCNC: 0.8 MG/DL (ref 0–1)
BUN BLDV-MCNC: 8 MG/DL (ref 7–20)
CALCIUM SERPL-MCNC: 9.2 MG/DL (ref 8.3–10.6)
CHLORIDE BLD-SCNC: 102 MMOL/L (ref 99–110)
CO2: 24 MMOL/L (ref 21–32)
CREAT SERPL-MCNC: 0.6 MG/DL (ref 0.6–1.1)
EKG ATRIAL RATE: 64 BPM
EKG DIAGNOSIS: NORMAL
EKG P AXIS: 38 DEGREES
EKG P-R INTERVAL: 126 MS
EKG Q-T INTERVAL: 434 MS
EKG QRS DURATION: 90 MS
EKG QTC CALCULATION (BAZETT): 447 MS
EKG R AXIS: 5 DEGREES
EKG T AXIS: 22 DEGREES
EKG VENTRICULAR RATE: 64 BPM
GFR AFRICAN AMERICAN: >60
GFR NON-AFRICAN AMERICAN: >60
GLOBULIN: 2.9 G/DL
GLUCOSE BLD-MCNC: 128 MG/DL (ref 70–99)
HAV IGM SER IA-ACNC: NORMAL
HCT VFR BLD CALC: 41.5 % (ref 36–48)
HEMOGLOBIN: 14.2 G/DL (ref 12–16)
HEPATITIS B CORE IGM ANTIBODY: NORMAL
HEPATITIS B SURFACE ANTIGEN INTERPRETATION: NORMAL
HEPATITIS C ANTIBODY INTERPRETATION: NORMAL
MCH RBC QN AUTO: 30 PG (ref 26–34)
MCHC RBC AUTO-ENTMCNC: 34.2 G/DL (ref 31–36)
MCV RBC AUTO: 87.9 FL (ref 80–100)
PDW BLD-RTO: 13.2 % (ref 12.4–15.4)
PLATELET # BLD: 215 K/UL (ref 135–450)
PMV BLD AUTO: 7.5 FL (ref 5–10.5)
POTASSIUM REFLEX MAGNESIUM: 3.8 MMOL/L (ref 3.5–5.1)
RBC # BLD: 4.72 M/UL (ref 4–5.2)
SODIUM BLD-SCNC: 139 MMOL/L (ref 136–145)
TOTAL PROTEIN: 6.4 G/DL (ref 6.4–8.2)
WBC # BLD: 6 K/UL (ref 4–11)

## 2019-10-31 PROCEDURE — 6370000000 HC RX 637 (ALT 250 FOR IP): Performed by: NURSE PRACTITIONER

## 2019-10-31 PROCEDURE — 93010 ELECTROCARDIOGRAM REPORT: CPT | Performed by: INTERNAL MEDICINE

## 2019-10-31 PROCEDURE — 85027 COMPLETE CBC AUTOMATED: CPT

## 2019-10-31 PROCEDURE — 36415 COLL VENOUS BLD VENIPUNCTURE: CPT

## 2019-10-31 PROCEDURE — 2580000003 HC RX 258: Performed by: INTERNAL MEDICINE

## 2019-10-31 PROCEDURE — 80074 ACUTE HEPATITIS PANEL: CPT

## 2019-10-31 PROCEDURE — 80053 COMPREHEN METABOLIC PANEL: CPT

## 2019-10-31 PROCEDURE — 6360000002 HC RX W HCPCS: Performed by: INTERNAL MEDICINE

## 2019-10-31 PROCEDURE — 93005 ELECTROCARDIOGRAM TRACING: CPT | Performed by: HOSPITALIST

## 2019-10-31 PROCEDURE — 94760 N-INVAS EAR/PLS OXIMETRY 1: CPT

## 2019-10-31 RX ORDER — HYDROCODONE BITARTRATE AND ACETAMINOPHEN 5; 325 MG/1; MG/1
1 TABLET ORAL EVERY 6 HOURS PRN
Qty: 28 TABLET | Refills: 0 | Status: ON HOLD | OUTPATIENT
Start: 2019-10-31 | End: 2019-11-10 | Stop reason: HOSPADM

## 2019-10-31 RX ORDER — LEVOFLOXACIN 500 MG/1
500 TABLET, FILM COATED ORAL DAILY
Qty: 7 TABLET | Refills: 0 | Status: ON HOLD | OUTPATIENT
Start: 2019-10-31 | End: 2019-11-10 | Stop reason: HOSPADM

## 2019-10-31 RX ADMIN — HYDROCODONE BITARTRATE AND ACETAMINOPHEN 2 TABLET: 5; 325 TABLET ORAL at 12:41

## 2019-10-31 RX ADMIN — ENOXAPARIN SODIUM 40 MG: 40 INJECTION SUBCUTANEOUS at 09:30

## 2019-10-31 RX ADMIN — HYDROCODONE BITARTRATE AND ACETAMINOPHEN 2 TABLET: 5; 325 TABLET ORAL at 05:51

## 2019-10-31 RX ADMIN — DIPHENHYDRAMINE HCL 25 MG: 25 TABLET ORAL at 14:37

## 2019-10-31 RX ADMIN — HYDROCORTISONE: 25 CREAM TOPICAL at 10:53

## 2019-10-31 RX ADMIN — ONDANSETRON 4 MG: 2 INJECTION INTRAMUSCULAR; INTRAVENOUS at 13:41

## 2019-10-31 RX ADMIN — PIPERACILLIN AND TAZOBACTAM 3.38 G: 3; .375 INJECTION, POWDER, LYOPHILIZED, FOR SOLUTION INTRAVENOUS at 09:30

## 2019-10-31 ASSESSMENT — PAIN DESCRIPTION - FREQUENCY
FREQUENCY: CONTINUOUS
FREQUENCY: CONTINUOUS

## 2019-10-31 ASSESSMENT — PAIN DESCRIPTION - DESCRIPTORS
DESCRIPTORS: BURNING
DESCRIPTORS: ACHING

## 2019-10-31 ASSESSMENT — PAIN DESCRIPTION - LOCATION
LOCATION: ABDOMEN
LOCATION: ABDOMEN

## 2019-10-31 ASSESSMENT — PAIN SCALES - GENERAL
PAINLEVEL_OUTOF10: 0
PAINLEVEL_OUTOF10: 2
PAINLEVEL_OUTOF10: 8
PAINLEVEL_OUTOF10: 7

## 2019-10-31 ASSESSMENT — PAIN DESCRIPTION - ONSET
ONSET: ON-GOING
ONSET: ON-GOING

## 2019-10-31 ASSESSMENT — PAIN DESCRIPTION - PAIN TYPE
TYPE: ACUTE PAIN
TYPE: ACUTE PAIN

## 2019-10-31 ASSESSMENT — PAIN DESCRIPTION - PROGRESSION
CLINICAL_PROGRESSION: NOT CHANGED
CLINICAL_PROGRESSION: NOT CHANGED

## 2019-10-31 ASSESSMENT — PAIN DESCRIPTION - ORIENTATION
ORIENTATION: RIGHT
ORIENTATION: MID

## 2019-10-31 ASSESSMENT — PAIN DESCRIPTION - DIRECTION: RADIATING_TOWARDS: BA

## 2019-11-01 LAB
BLOOD CULTURE, ROUTINE: NORMAL
CULTURE, BLOOD 2: NORMAL

## 2019-11-04 ENCOUNTER — TELEPHONE (OUTPATIENT)
Dept: PRIMARY CARE CLINIC | Age: 39
End: 2019-11-04

## 2019-11-04 ENCOUNTER — APPOINTMENT (OUTPATIENT)
Dept: GENERAL RADIOLOGY | Age: 39
DRG: 463 | End: 2019-11-04
Payer: COMMERCIAL

## 2019-11-04 ENCOUNTER — HOSPITAL ENCOUNTER (EMERGENCY)
Age: 39
Discharge: HOME | DRG: 463 | End: 2019-11-04
Attending: EMERGENCY MEDICINE
Payer: COMMERCIAL

## 2019-11-04 ENCOUNTER — NURSE TRIAGE (OUTPATIENT)
Dept: OTHER | Facility: CLINIC | Age: 39
End: 2019-11-04

## 2019-11-04 VITALS
HEIGHT: 59 IN | SYSTOLIC BLOOD PRESSURE: 105 MMHG | BODY MASS INDEX: 31.69 KG/M2 | HEART RATE: 70 BPM | DIASTOLIC BLOOD PRESSURE: 69 MMHG | TEMPERATURE: 99 F | RESPIRATION RATE: 15 BRPM | OXYGEN SATURATION: 100 % | WEIGHT: 157.19 LBS

## 2019-11-04 DIAGNOSIS — R11.2 NON-INTRACTABLE VOMITING WITH NAUSEA, UNSPECIFIED VOMITING TYPE: Primary | ICD-10-CM

## 2019-11-04 DIAGNOSIS — R10.9 RIGHT FLANK PAIN: ICD-10-CM

## 2019-11-04 LAB
ALBUMIN SERPL-MCNC: 4.9 G/DL (ref 3.4–5)
ALP BLD-CCNC: 95 U/L (ref 40–129)
ALT SERPL-CCNC: 89 U/L (ref 10–40)
ANION GAP SERPL CALCULATED.3IONS-SCNC: 15 MMOL/L (ref 3–16)
AST SERPL-CCNC: 27 U/L (ref 15–37)
BACTERIA: ABNORMAL /HPF
BASOPHILS ABSOLUTE: 0.1 K/UL (ref 0–0.2)
BASOPHILS RELATIVE PERCENT: 1.1 %
BILIRUB SERPL-MCNC: 0.4 MG/DL (ref 0–1)
BILIRUBIN DIRECT: <0.2 MG/DL (ref 0–0.3)
BILIRUBIN URINE: NEGATIVE
BILIRUBIN, INDIRECT: ABNORMAL MG/DL (ref 0–1)
BLOOD, URINE: ABNORMAL
BUN BLDV-MCNC: 12 MG/DL (ref 7–20)
CALCIUM SERPL-MCNC: 10 MG/DL (ref 8.3–10.6)
CHLORIDE BLD-SCNC: 101 MMOL/L (ref 99–110)
CLARITY: CLEAR
CO2: 20 MMOL/L (ref 21–32)
COLOR: YELLOW
COMMENT UA: ABNORMAL
CREAT SERPL-MCNC: 0.6 MG/DL (ref 0.6–1.1)
EOSINOPHILS ABSOLUTE: 0.3 K/UL (ref 0–0.6)
EOSINOPHILS RELATIVE PERCENT: 2.9 %
EPITHELIAL CELLS, UA: ABNORMAL /HPF
GFR AFRICAN AMERICAN: >60
GFR NON-AFRICAN AMERICAN: >60
GLUCOSE BLD-MCNC: 152 MG/DL (ref 70–99)
GLUCOSE URINE: NEGATIVE MG/DL
HCT VFR BLD CALC: 48.5 % (ref 36–48)
HEMOGLOBIN: 16.3 G/DL (ref 12–16)
KETONES, URINE: NEGATIVE MG/DL
LACTIC ACID: 1.6 MMOL/L (ref 0.4–2)
LEUKOCYTE ESTERASE, URINE: ABNORMAL
LYMPHOCYTES ABSOLUTE: 4.8 K/UL (ref 1–5.1)
LYMPHOCYTES RELATIVE PERCENT: 40.6 %
MCH RBC QN AUTO: 29.9 PG (ref 26–34)
MCHC RBC AUTO-ENTMCNC: 33.5 G/DL (ref 31–36)
MCV RBC AUTO: 89.2 FL (ref 80–100)
MICROSCOPIC EXAMINATION: YES
MONOCYTES ABSOLUTE: 0.6 K/UL (ref 0–1.3)
MONOCYTES RELATIVE PERCENT: 5.4 %
NEUTROPHILS ABSOLUTE: 5.9 K/UL (ref 1.7–7.7)
NEUTROPHILS RELATIVE PERCENT: 50 %
NITRITE, URINE: NEGATIVE
PDW BLD-RTO: 13.9 % (ref 12.4–15.4)
PH UA: 5.5 (ref 5–8)
PLATELET # BLD: 284 K/UL (ref 135–450)
PMV BLD AUTO: 7.7 FL (ref 5–10.5)
POTASSIUM REFLEX MAGNESIUM: 3.8 MMOL/L (ref 3.5–5.1)
PROTEIN UA: ABNORMAL MG/DL
RBC # BLD: 5.44 M/UL (ref 4–5.2)
RBC UA: ABNORMAL /HPF (ref 0–2)
SODIUM BLD-SCNC: 136 MMOL/L (ref 136–145)
SPECIFIC GRAVITY UA: 1.02 (ref 1–1.03)
TOTAL PROTEIN: 7.7 G/DL (ref 6.4–8.2)
URINE REFLEX TO CULTURE: YES
URINE TYPE: ABNORMAL
UROBILINOGEN, URINE: 0.2 E.U./DL
WBC # BLD: 11.9 K/UL (ref 4–11)
WBC UA: ABNORMAL /HPF (ref 0–5)

## 2019-11-04 PROCEDURE — 6370000000 HC RX 637 (ALT 250 FOR IP): Performed by: PHYSICIAN ASSISTANT

## 2019-11-04 PROCEDURE — 87040 BLOOD CULTURE FOR BACTERIA: CPT

## 2019-11-04 PROCEDURE — 99284 EMERGENCY DEPT VISIT MOD MDM: CPT

## 2019-11-04 PROCEDURE — 51702 INSERT TEMP BLADDER CATH: CPT

## 2019-11-04 PROCEDURE — 87186 SC STD MICRODIL/AGAR DIL: CPT

## 2019-11-04 PROCEDURE — 6360000002 HC RX W HCPCS: Performed by: PHYSICIAN ASSISTANT

## 2019-11-04 PROCEDURE — 80048 BASIC METABOLIC PNL TOTAL CA: CPT

## 2019-11-04 PROCEDURE — 2580000003 HC RX 258: Performed by: PHYSICIAN ASSISTANT

## 2019-11-04 PROCEDURE — 85025 COMPLETE CBC W/AUTO DIFF WBC: CPT

## 2019-11-04 PROCEDURE — 71046 X-RAY EXAM CHEST 2 VIEWS: CPT

## 2019-11-04 PROCEDURE — 80076 HEPATIC FUNCTION PANEL: CPT

## 2019-11-04 PROCEDURE — 83605 ASSAY OF LACTIC ACID: CPT

## 2019-11-04 PROCEDURE — 96376 TX/PRO/DX INJ SAME DRUG ADON: CPT

## 2019-11-04 PROCEDURE — 96374 THER/PROPH/DIAG INJ IV PUSH: CPT

## 2019-11-04 PROCEDURE — 87086 URINE CULTURE/COLONY COUNT: CPT

## 2019-11-04 PROCEDURE — 81001 URINALYSIS AUTO W/SCOPE: CPT

## 2019-11-04 PROCEDURE — 96361 HYDRATE IV INFUSION ADD-ON: CPT

## 2019-11-04 PROCEDURE — 87077 CULTURE AEROBIC IDENTIFY: CPT

## 2019-11-04 PROCEDURE — 96375 TX/PRO/DX INJ NEW DRUG ADDON: CPT

## 2019-11-04 RX ORDER — LEVOFLOXACIN 500 MG/1
500 TABLET, FILM COATED ORAL ONCE
Status: COMPLETED | OUTPATIENT
Start: 2019-11-04 | End: 2019-11-04

## 2019-11-04 RX ORDER — 0.9 % SODIUM CHLORIDE 0.9 %
30 INTRAVENOUS SOLUTION INTRAVENOUS ONCE
Status: COMPLETED | OUTPATIENT
Start: 2019-11-04 | End: 2019-11-04

## 2019-11-04 RX ORDER — ONDANSETRON 2 MG/ML
4 INJECTION INTRAMUSCULAR; INTRAVENOUS ONCE
Status: COMPLETED | OUTPATIENT
Start: 2019-11-04 | End: 2019-11-04

## 2019-11-04 RX ORDER — ONDANSETRON 4 MG/1
4 TABLET, ORALLY DISINTEGRATING ORAL EVERY 8 HOURS PRN
Qty: 12 TABLET | Refills: 0 | Status: SHIPPED | OUTPATIENT
Start: 2019-11-04 | End: 2019-11-07

## 2019-11-04 RX ADMIN — LEVOFLOXACIN 500 MG: 500 TABLET, FILM COATED ORAL at 21:19

## 2019-11-04 RX ADMIN — SODIUM CHLORIDE 2139 ML: 9 INJECTION, SOLUTION INTRAVENOUS at 18:49

## 2019-11-04 RX ADMIN — HYDROMORPHONE HYDROCHLORIDE 0.5 MG: 1 INJECTION, SOLUTION INTRAMUSCULAR; INTRAVENOUS; SUBCUTANEOUS at 20:13

## 2019-11-04 RX ADMIN — ONDANSETRON 4 MG: 2 INJECTION INTRAMUSCULAR; INTRAVENOUS at 18:49

## 2019-11-04 RX ADMIN — HYDROMORPHONE HYDROCHLORIDE 0.5 MG: 1 INJECTION, SOLUTION INTRAMUSCULAR; INTRAVENOUS; SUBCUTANEOUS at 18:49

## 2019-11-04 ASSESSMENT — PAIN SCALES - GENERAL
PAINLEVEL_OUTOF10: 9
PAINLEVEL_OUTOF10: 10
PAINLEVEL_OUTOF10: 6
PAINLEVEL_OUTOF10: 10
PAINLEVEL_OUTOF10: 4
PAINLEVEL_OUTOF10: 5

## 2019-11-04 ASSESSMENT — PAIN DESCRIPTION - PROGRESSION: CLINICAL_PROGRESSION: GRADUALLY WORSENING

## 2019-11-04 ASSESSMENT — ENCOUNTER SYMPTOMS
NAUSEA: 1
ABDOMINAL PAIN: 1
SHORTNESS OF BREATH: 0
VOMITING: 1
BACK PAIN: 0
DIARRHEA: 0
COLOR CHANGE: 0

## 2019-11-04 ASSESSMENT — PAIN DESCRIPTION - PAIN TYPE: TYPE: CHRONIC PAIN

## 2019-11-04 ASSESSMENT — PAIN DESCRIPTION - LOCATION: LOCATION: ABDOMEN

## 2019-11-04 ASSESSMENT — PAIN DESCRIPTION - DESCRIPTORS: DESCRIPTORS: ACHING;CONSTANT

## 2019-11-04 ASSESSMENT — PAIN DESCRIPTION - ORIENTATION: ORIENTATION: MID;LOWER

## 2019-11-04 ASSESSMENT — PAIN DESCRIPTION - FREQUENCY: FREQUENCY: CONTINUOUS

## 2019-11-04 ASSESSMENT — PAIN SCALES - WONG BAKER: WONGBAKER_NUMERICALRESPONSE: 10

## 2019-11-04 ASSESSMENT — PAIN DESCRIPTION - ONSET: ONSET: PROGRESSIVE

## 2019-11-06 ENCOUNTER — APPOINTMENT (OUTPATIENT)
Dept: CT IMAGING | Age: 39
DRG: 463 | End: 2019-11-06
Payer: COMMERCIAL

## 2019-11-06 ENCOUNTER — HOSPITAL ENCOUNTER (EMERGENCY)
Age: 39
Discharge: HOME | DRG: 463 | End: 2019-11-06
Payer: COMMERCIAL

## 2019-11-06 VITALS
HEART RATE: 67 BPM | BODY MASS INDEX: 31.87 KG/M2 | DIASTOLIC BLOOD PRESSURE: 69 MMHG | TEMPERATURE: 99 F | OXYGEN SATURATION: 100 % | HEIGHT: 59 IN | WEIGHT: 158.07 LBS | RESPIRATION RATE: 18 BRPM | SYSTOLIC BLOOD PRESSURE: 115 MMHG

## 2019-11-06 DIAGNOSIS — R11.2 NON-INTRACTABLE VOMITING WITH NAUSEA, UNSPECIFIED VOMITING TYPE: Primary | ICD-10-CM

## 2019-11-06 DIAGNOSIS — R10.9 RIGHT SIDED ABDOMINAL PAIN: ICD-10-CM

## 2019-11-06 LAB
A/G RATIO: 1.3 (ref 1.1–2.2)
ALBUMIN SERPL-MCNC: 4.2 G/DL (ref 3.4–5)
ALP BLD-CCNC: 127 U/L (ref 40–129)
ALT SERPL-CCNC: 54 U/L (ref 10–40)
ANION GAP SERPL CALCULATED.3IONS-SCNC: 13 MMOL/L (ref 3–16)
ANISOCYTOSIS: ABNORMAL
AST SERPL-CCNC: 21 U/L (ref 15–37)
ATYPICAL LYMPHOCYTE RELATIVE PERCENT: 2 % (ref 0–6)
BACTERIA: ABNORMAL /HPF
BASOPHILS ABSOLUTE: 0 K/UL (ref 0–0.2)
BASOPHILS RELATIVE PERCENT: 0 %
BILIRUB SERPL-MCNC: 0.3 MG/DL (ref 0–1)
BILIRUBIN URINE: NEGATIVE
BLOOD, URINE: ABNORMAL
BUN BLDV-MCNC: 11 MG/DL (ref 7–20)
CALCIUM SERPL-MCNC: 9.1 MG/DL (ref 8.3–10.6)
CHLORIDE BLD-SCNC: 103 MMOL/L (ref 99–110)
CLARITY: CLEAR
CO2: 23 MMOL/L (ref 21–32)
COLOR: YELLOW
CREAT SERPL-MCNC: 0.7 MG/DL (ref 0.6–1.1)
EOSINOPHILS ABSOLUTE: 0.7 K/UL (ref 0–0.6)
EOSINOPHILS RELATIVE PERCENT: 6 %
EPITHELIAL CELLS, UA: 1 /HPF (ref 0–5)
GFR AFRICAN AMERICAN: >60
GFR NON-AFRICAN AMERICAN: >60
GLOBULIN: 3.3 G/DL
GLUCOSE BLD-MCNC: 92 MG/DL (ref 70–99)
GLUCOSE URINE: NEGATIVE MG/DL
HCG QUALITATIVE: NEGATIVE
HCT VFR BLD CALC: 42.7 % (ref 36–48)
HEMATOLOGY PATH CONSULT: YES
HEMOGLOBIN: 14.7 G/DL (ref 12–16)
HYALINE CASTS: 1 /LPF (ref 0–8)
KETONES, URINE: NEGATIVE MG/DL
LEUKOCYTE ESTERASE, URINE: ABNORMAL
LIPASE: 28 U/L (ref 13–60)
LYMPHOCYTES ABSOLUTE: 6.4 K/UL (ref 1–5.1)
LYMPHOCYTES RELATIVE PERCENT: 53 %
MACROCYTES: ABNORMAL
MCH RBC QN AUTO: 30.4 PG (ref 26–34)
MCHC RBC AUTO-ENTMCNC: 34.4 G/DL (ref 31–36)
MCV RBC AUTO: 88.5 FL (ref 80–100)
MICROCYTES: ABNORMAL
MICROSCOPIC EXAMINATION: YES
MONOCYTES ABSOLUTE: 0.2 K/UL (ref 0–1.3)
MONOCYTES RELATIVE PERCENT: 2 %
MYELOCYTE PERCENT: 1 %
NEUTROPHILS ABSOLUTE: 4.3 K/UL (ref 1.7–7.7)
NEUTROPHILS RELATIVE PERCENT: 36 %
NITRITE, URINE: POSITIVE
PDW BLD-RTO: 13.5 % (ref 12.4–15.4)
PH UA: 5.5 (ref 5–8)
PLATELET # BLD: 276 K/UL (ref 135–450)
PMV BLD AUTO: 7.3 FL (ref 5–10.5)
POTASSIUM REFLEX MAGNESIUM: 4 MMOL/L (ref 3.5–5.1)
PROTEIN UA: NEGATIVE MG/DL
RBC # BLD: 4.82 M/UL (ref 4–5.2)
RBC UA: 11 /HPF (ref 0–4)
SLIDE REVIEW: ABNORMAL
SMUDGE CELLS: PRESENT
SODIUM BLD-SCNC: 139 MMOL/L (ref 136–145)
SPECIFIC GRAVITY UA: 1.02 (ref 1–1.03)
TOTAL PROTEIN: 7.5 G/DL (ref 6.4–8.2)
URINE REFLEX TO CULTURE: YES
URINE TYPE: ABNORMAL
UROBILINOGEN, URINE: 0.2 E.U./DL
WBC # BLD: 11.6 K/UL (ref 4–11)
WBC UA: 11 /HPF (ref 0–5)

## 2019-11-06 PROCEDURE — 96361 HYDRATE IV INFUSION ADD-ON: CPT

## 2019-11-06 PROCEDURE — 96375 TX/PRO/DX INJ NEW DRUG ADDON: CPT

## 2019-11-06 PROCEDURE — 96374 THER/PROPH/DIAG INJ IV PUSH: CPT

## 2019-11-06 PROCEDURE — 84703 CHORIONIC GONADOTROPIN ASSAY: CPT

## 2019-11-06 PROCEDURE — 2580000003 HC RX 258: Performed by: PHYSICIAN ASSISTANT

## 2019-11-06 PROCEDURE — 6370000000 HC RX 637 (ALT 250 FOR IP): Performed by: PHYSICIAN ASSISTANT

## 2019-11-06 PROCEDURE — 80053 COMPREHEN METABOLIC PANEL: CPT

## 2019-11-06 PROCEDURE — 87086 URINE CULTURE/COLONY COUNT: CPT

## 2019-11-06 PROCEDURE — 6360000002 HC RX W HCPCS: Performed by: PHYSICIAN ASSISTANT

## 2019-11-06 PROCEDURE — 74177 CT ABD & PELVIS W/CONTRAST: CPT

## 2019-11-06 PROCEDURE — 87077 CULTURE AEROBIC IDENTIFY: CPT

## 2019-11-06 PROCEDURE — 99284 EMERGENCY DEPT VISIT MOD MDM: CPT

## 2019-11-06 PROCEDURE — 83690 ASSAY OF LIPASE: CPT

## 2019-11-06 PROCEDURE — 81001 URINALYSIS AUTO W/SCOPE: CPT

## 2019-11-06 PROCEDURE — 87186 SC STD MICRODIL/AGAR DIL: CPT

## 2019-11-06 PROCEDURE — 6360000004 HC RX CONTRAST MEDICATION: Performed by: PHYSICIAN ASSISTANT

## 2019-11-06 PROCEDURE — 85025 COMPLETE CBC W/AUTO DIFF WBC: CPT

## 2019-11-06 RX ORDER — OXYCODONE HYDROCHLORIDE AND ACETAMINOPHEN 5; 325 MG/1; MG/1
1 TABLET ORAL ONCE
Status: COMPLETED | OUTPATIENT
Start: 2019-11-06 | End: 2019-11-06

## 2019-11-06 RX ORDER — 0.9 % SODIUM CHLORIDE 0.9 %
1000 INTRAVENOUS SOLUTION INTRAVENOUS ONCE
Status: COMPLETED | OUTPATIENT
Start: 2019-11-06 | End: 2019-11-06

## 2019-11-06 RX ORDER — PROMETHAZINE HYDROCHLORIDE 25 MG/1
25 TABLET ORAL EVERY 6 HOURS PRN
Qty: 12 TABLET | Refills: 0 | Status: SHIPPED | OUTPATIENT
Start: 2019-11-06 | End: 2019-11-07

## 2019-11-06 RX ORDER — ONDANSETRON 2 MG/ML
4 INJECTION INTRAMUSCULAR; INTRAVENOUS ONCE
Status: COMPLETED | OUTPATIENT
Start: 2019-11-06 | End: 2019-11-06

## 2019-11-06 RX ADMIN — HYDROMORPHONE HYDROCHLORIDE 0.5 MG: 1 INJECTION, SOLUTION INTRAMUSCULAR; INTRAVENOUS; SUBCUTANEOUS at 18:45

## 2019-11-06 RX ADMIN — IOPAMIDOL 75 ML: 755 INJECTION, SOLUTION INTRAVENOUS at 20:15

## 2019-11-06 RX ADMIN — OXYCODONE HYDROCHLORIDE AND ACETAMINOPHEN 1 TABLET: 5; 325 TABLET ORAL at 22:14

## 2019-11-06 RX ADMIN — ONDANSETRON 4 MG: 2 INJECTION INTRAMUSCULAR; INTRAVENOUS at 18:44

## 2019-11-06 RX ADMIN — SODIUM CHLORIDE 1000 ML: 9 INJECTION, SOLUTION INTRAVENOUS at 18:45

## 2019-11-06 ASSESSMENT — PAIN DESCRIPTION - PAIN TYPE
TYPE: ACUTE PAIN
TYPE: ACUTE PAIN

## 2019-11-06 ASSESSMENT — PAIN DESCRIPTION - FREQUENCY: FREQUENCY: CONTINUOUS

## 2019-11-06 ASSESSMENT — PAIN DESCRIPTION - DESCRIPTORS
DESCRIPTORS: ACHING
DESCRIPTORS: ACHING

## 2019-11-06 ASSESSMENT — PAIN SCALES - GENERAL
PAINLEVEL_OUTOF10: 8
PAINLEVEL_OUTOF10: 7
PAINLEVEL_OUTOF10: 8

## 2019-11-06 ASSESSMENT — PAIN DESCRIPTION - LOCATION
LOCATION: ABDOMEN
LOCATION: ABDOMEN

## 2019-11-06 ASSESSMENT — PAIN - FUNCTIONAL ASSESSMENT
PAIN_FUNCTIONAL_ASSESSMENT: 0-10
PAIN_FUNCTIONAL_ASSESSMENT: PREVENTS OR INTERFERES SOME ACTIVE ACTIVITIES AND ADLS

## 2019-11-06 ASSESSMENT — PAIN DESCRIPTION - PROGRESSION: CLINICAL_PROGRESSION: NOT CHANGED

## 2019-11-07 ENCOUNTER — HOSPITAL ENCOUNTER (INPATIENT)
Age: 39
LOS: 3 days | Discharge: HOME HEALTH | DRG: 463 | End: 2019-11-10
Attending: INTERNAL MEDICINE | Admitting: INTERNAL MEDICINE
Payer: COMMERCIAL

## 2019-11-07 ENCOUNTER — TELEPHONE (OUTPATIENT)
Dept: INTERNAL MEDICINE CLINIC | Age: 39
End: 2019-11-07

## 2019-11-07 DIAGNOSIS — Z78.9 FAILURE OF OUTPATIENT TREATMENT: ICD-10-CM

## 2019-11-07 DIAGNOSIS — N23 RENAL COLIC: ICD-10-CM

## 2019-11-07 DIAGNOSIS — N39.0 COMPLICATED UTI (URINARY TRACT INFECTION): Primary | ICD-10-CM

## 2019-11-07 DIAGNOSIS — R11.2 NAUSEA AND VOMITING, INTRACTABILITY OF VOMITING NOT SPECIFIED, UNSPECIFIED VOMITING TYPE: ICD-10-CM

## 2019-11-07 LAB
A/G RATIO: 1.2 (ref 1.1–2.2)
ALBUMIN SERPL-MCNC: 4 G/DL (ref 3.4–5)
ALP BLD-CCNC: 113 U/L (ref 40–129)
ALT SERPL-CCNC: 50 U/L (ref 10–40)
ANION GAP SERPL CALCULATED.3IONS-SCNC: 12 MMOL/L (ref 3–16)
AST SERPL-CCNC: 32 U/L (ref 15–37)
BACTERIA: ABNORMAL /HPF
BASOPHILS ABSOLUTE: 0.1 K/UL (ref 0–0.2)
BASOPHILS RELATIVE PERCENT: 1 %
BILIRUB SERPL-MCNC: 0.4 MG/DL (ref 0–1)
BILIRUBIN URINE: NEGATIVE
BLOOD, URINE: ABNORMAL
BUN BLDV-MCNC: 12 MG/DL (ref 7–20)
CALCIUM SERPL-MCNC: 9.4 MG/DL (ref 8.3–10.6)
CHLORIDE BLD-SCNC: 102 MMOL/L (ref 99–110)
CLARITY: CLEAR
CO2: 22 MMOL/L (ref 21–32)
COLOR: YELLOW
CREAT SERPL-MCNC: 0.7 MG/DL (ref 0.6–1.1)
EOSINOPHILS ABSOLUTE: 0.4 K/UL (ref 0–0.6)
EOSINOPHILS RELATIVE PERCENT: 3.9 %
EPITHELIAL CELLS, UA: 0 /HPF (ref 0–5)
GFR AFRICAN AMERICAN: >60
GFR NON-AFRICAN AMERICAN: >60
GLOBULIN: 3.4 G/DL
GLUCOSE BLD-MCNC: 147 MG/DL (ref 70–99)
GLUCOSE URINE: NEGATIVE MG/DL
HCG QUALITATIVE: NEGATIVE
HCT VFR BLD CALC: 42.3 % (ref 36–48)
HEMATOLOGY PATH CONSULT: NORMAL
HEMOGLOBIN: 14.5 G/DL (ref 12–16)
HYALINE CASTS: 2 /LPF (ref 0–8)
KETONES, URINE: NEGATIVE MG/DL
LEUKOCYTE ESTERASE, URINE: ABNORMAL
LIPASE: 19 U/L (ref 13–60)
LYMPHOCYTES ABSOLUTE: 4.2 K/UL (ref 1–5.1)
LYMPHOCYTES RELATIVE PERCENT: 42.8 %
MCH RBC QN AUTO: 30.2 PG (ref 26–34)
MCHC RBC AUTO-ENTMCNC: 34.2 G/DL (ref 31–36)
MCV RBC AUTO: 88.4 FL (ref 80–100)
MICROSCOPIC EXAMINATION: YES
MONOCYTES ABSOLUTE: 0.5 K/UL (ref 0–1.3)
MONOCYTES RELATIVE PERCENT: 4.9 %
NEUTROPHILS ABSOLUTE: 4.6 K/UL (ref 1.7–7.7)
NEUTROPHILS RELATIVE PERCENT: 47.4 %
NITRITE, URINE: POSITIVE
ORGANISM: ABNORMAL
PDW BLD-RTO: 13.5 % (ref 12.4–15.4)
PH UA: 6 (ref 5–8)
PLATELET # BLD: 265 K/UL (ref 135–450)
PMV BLD AUTO: 7.5 FL (ref 5–10.5)
POTASSIUM REFLEX MAGNESIUM: 3.7 MMOL/L (ref 3.5–5.1)
PROTEIN UA: NEGATIVE MG/DL
RBC # BLD: 4.79 M/UL (ref 4–5.2)
RBC UA: 5 /HPF (ref 0–4)
SODIUM BLD-SCNC: 136 MMOL/L (ref 136–145)
SPECIFIC GRAVITY UA: 1.01 (ref 1–1.03)
TOTAL PROTEIN: 7.4 G/DL (ref 6.4–8.2)
URINE CULTURE, ROUTINE: ABNORMAL
URINE CULTURE, ROUTINE: ABNORMAL
URINE REFLEX TO CULTURE: YES
URINE TYPE: ABNORMAL
UROBILINOGEN, URINE: 1 E.U./DL
WBC # BLD: 9.8 K/UL (ref 4–11)
WBC UA: 35 /HPF (ref 0–5)

## 2019-11-07 PROCEDURE — 96365 THER/PROPH/DIAG IV INF INIT: CPT

## 2019-11-07 PROCEDURE — 87077 CULTURE AEROBIC IDENTIFY: CPT

## 2019-11-07 PROCEDURE — 2580000003 HC RX 258: Performed by: NURSE PRACTITIONER

## 2019-11-07 PROCEDURE — 87086 URINE CULTURE/COLONY COUNT: CPT

## 2019-11-07 PROCEDURE — 87186 SC STD MICRODIL/AGAR DIL: CPT

## 2019-11-07 PROCEDURE — 84703 CHORIONIC GONADOTROPIN ASSAY: CPT

## 2019-11-07 PROCEDURE — 6370000000 HC RX 637 (ALT 250 FOR IP): Performed by: PHYSICIAN ASSISTANT

## 2019-11-07 PROCEDURE — 80053 COMPREHEN METABOLIC PANEL: CPT

## 2019-11-07 PROCEDURE — 96375 TX/PRO/DX INJ NEW DRUG ADDON: CPT

## 2019-11-07 PROCEDURE — 81001 URINALYSIS AUTO W/SCOPE: CPT

## 2019-11-07 PROCEDURE — 83690 ASSAY OF LIPASE: CPT

## 2019-11-07 PROCEDURE — 85025 COMPLETE CBC W/AUTO DIFF WBC: CPT

## 2019-11-07 PROCEDURE — 1200000000 HC SEMI PRIVATE

## 2019-11-07 PROCEDURE — 6360000002 HC RX W HCPCS: Performed by: NURSE PRACTITIONER

## 2019-11-07 PROCEDURE — 6360000002 HC RX W HCPCS: Performed by: PHYSICIAN ASSISTANT

## 2019-11-07 PROCEDURE — 99284 EMERGENCY DEPT VISIT MOD MDM: CPT

## 2019-11-07 PROCEDURE — 96376 TX/PRO/DX INJ SAME DRUG ADON: CPT

## 2019-11-07 RX ORDER — NITROFURANTOIN 25; 75 MG/1; MG/1
100 CAPSULE ORAL 2 TIMES DAILY
Qty: 14 CAPSULE | Refills: 0 | Status: ON HOLD | OUTPATIENT
Start: 2019-11-07 | End: 2019-11-10 | Stop reason: HOSPADM

## 2019-11-07 RX ORDER — ACETAMINOPHEN 325 MG/1
650 TABLET ORAL EVERY 4 HOURS PRN
Status: DISCONTINUED | OUTPATIENT
Start: 2019-11-07 | End: 2019-11-10 | Stop reason: HOSPADM

## 2019-11-07 RX ORDER — OXYCODONE HYDROCHLORIDE AND ACETAMINOPHEN 5; 325 MG/1; MG/1
1 TABLET ORAL ONCE
Status: COMPLETED | OUTPATIENT
Start: 2019-11-07 | End: 2019-11-07

## 2019-11-07 RX ORDER — PHENAZOPYRIDINE HYDROCHLORIDE 200 MG/1
200 TABLET, FILM COATED ORAL
Status: COMPLETED | OUTPATIENT
Start: 2019-11-08 | End: 2019-11-08

## 2019-11-07 RX ORDER — SODIUM CHLORIDE, SODIUM LACTATE, POTASSIUM CHLORIDE, CALCIUM CHLORIDE 600; 310; 30; 20 MG/100ML; MG/100ML; MG/100ML; MG/100ML
INJECTION, SOLUTION INTRAVENOUS CONTINUOUS
Status: DISCONTINUED | OUTPATIENT
Start: 2019-11-07 | End: 2019-11-09

## 2019-11-07 RX ORDER — LEVOFLOXACIN 5 MG/ML
500 INJECTION, SOLUTION INTRAVENOUS EVERY 24 HOURS
Status: CANCELLED | OUTPATIENT
Start: 2019-11-08

## 2019-11-07 RX ORDER — ONDANSETRON 4 MG/1
4 TABLET, ORALLY DISINTEGRATING ORAL ONCE
Status: COMPLETED | OUTPATIENT
Start: 2019-11-07 | End: 2019-11-07

## 2019-11-07 RX ORDER — ONDANSETRON 2 MG/ML
4 INJECTION INTRAMUSCULAR; INTRAVENOUS EVERY 6 HOURS PRN
Status: DISCONTINUED | OUTPATIENT
Start: 2019-11-07 | End: 2019-11-10 | Stop reason: HOSPADM

## 2019-11-07 RX ORDER — ONDANSETRON 2 MG/ML
4 INJECTION INTRAMUSCULAR; INTRAVENOUS ONCE
Status: COMPLETED | OUTPATIENT
Start: 2019-11-07 | End: 2019-11-07

## 2019-11-07 RX ORDER — SODIUM CHLORIDE 0.9 % (FLUSH) 0.9 %
10 SYRINGE (ML) INJECTION EVERY 12 HOURS SCHEDULED
Status: DISCONTINUED | OUTPATIENT
Start: 2019-11-07 | End: 2019-11-10 | Stop reason: HOSPADM

## 2019-11-07 RX ORDER — SODIUM CHLORIDE 0.9 % (FLUSH) 0.9 %
10 SYRINGE (ML) INJECTION PRN
Status: DISCONTINUED | OUTPATIENT
Start: 2019-11-07 | End: 2019-11-10 | Stop reason: HOSPADM

## 2019-11-07 RX ORDER — DIPHENHYDRAMINE HYDROCHLORIDE 50 MG/ML
25 INJECTION INTRAMUSCULAR; INTRAVENOUS ONCE
Status: COMPLETED | OUTPATIENT
Start: 2019-11-07 | End: 2019-11-07

## 2019-11-07 RX ORDER — PHENAZOPYRIDINE HYDROCHLORIDE 100 MG/1
200 TABLET, FILM COATED ORAL ONCE
Status: COMPLETED | OUTPATIENT
Start: 2019-11-07 | End: 2019-11-07

## 2019-11-07 RX ORDER — DIPHENHYDRAMINE HYDROCHLORIDE 50 MG/ML
25 INJECTION INTRAMUSCULAR; INTRAVENOUS EVERY 6 HOURS PRN
Status: DISCONTINUED | OUTPATIENT
Start: 2019-11-07 | End: 2019-11-10 | Stop reason: HOSPADM

## 2019-11-07 RX ORDER — LEVOFLOXACIN 5 MG/ML
500 INJECTION, SOLUTION INTRAVENOUS ONCE
Status: COMPLETED | OUTPATIENT
Start: 2019-11-07 | End: 2019-11-07

## 2019-11-07 RX ORDER — OXYCODONE HYDROCHLORIDE AND ACETAMINOPHEN 5; 325 MG/1; MG/1
1 TABLET ORAL EVERY 4 HOURS PRN
Status: DISCONTINUED | OUTPATIENT
Start: 2019-11-07 | End: 2019-11-10 | Stop reason: HOSPADM

## 2019-11-07 RX ORDER — OXYCODONE HYDROCHLORIDE AND ACETAMINOPHEN 5; 325 MG/1; MG/1
2 TABLET ORAL EVERY 4 HOURS PRN
Status: DISCONTINUED | OUTPATIENT
Start: 2019-11-07 | End: 2019-11-10 | Stop reason: HOSPADM

## 2019-11-07 RX ADMIN — HYDROMORPHONE HYDROCHLORIDE 0.5 MG: 1 INJECTION, SOLUTION INTRAMUSCULAR; INTRAVENOUS; SUBCUTANEOUS at 21:15

## 2019-11-07 RX ADMIN — OXYCODONE HYDROCHLORIDE AND ACETAMINOPHEN 1 TABLET: 5; 325 TABLET ORAL at 19:37

## 2019-11-07 RX ADMIN — ONDANSETRON 4 MG: 2 INJECTION INTRAMUSCULAR; INTRAVENOUS at 21:12

## 2019-11-07 RX ADMIN — LEVOFLOXACIN 500 MG: 5 INJECTION, SOLUTION INTRAVENOUS at 19:02

## 2019-11-07 RX ADMIN — MEROPENEM 500 MG: 500 INJECTION, POWDER, FOR SOLUTION INTRAVENOUS at 23:06

## 2019-11-07 RX ADMIN — ONDANSETRON 4 MG: 2 INJECTION INTRAMUSCULAR; INTRAVENOUS at 18:41

## 2019-11-07 RX ADMIN — HYDROMORPHONE HYDROCHLORIDE 0.5 MG: 1 INJECTION, SOLUTION INTRAMUSCULAR; INTRAVENOUS; SUBCUTANEOUS at 23:06

## 2019-11-07 RX ADMIN — ONDANSETRON 4 MG: 4 TABLET, ORALLY DISINTEGRATING ORAL at 19:37

## 2019-11-07 RX ADMIN — SODIUM CHLORIDE, PRESERVATIVE FREE 10 ML: 5 INJECTION INTRAVENOUS at 23:06

## 2019-11-07 RX ADMIN — DIPHENHYDRAMINE HYDROCHLORIDE 25 MG: 50 INJECTION, SOLUTION INTRAMUSCULAR; INTRAVENOUS at 21:14

## 2019-11-07 RX ADMIN — SODIUM CHLORIDE, POTASSIUM CHLORIDE, SODIUM LACTATE AND CALCIUM CHLORIDE: 600; 310; 30; 20 INJECTION, SOLUTION INTRAVENOUS at 23:08

## 2019-11-07 RX ADMIN — HYDROMORPHONE HYDROCHLORIDE 0.5 MG: 1 INJECTION, SOLUTION INTRAMUSCULAR; INTRAVENOUS; SUBCUTANEOUS at 18:42

## 2019-11-07 RX ADMIN — PHENAZOPYRIDINE 200 MG: 100 TABLET ORAL at 18:40

## 2019-11-07 ASSESSMENT — ENCOUNTER SYMPTOMS
APNEA: 0
ABDOMINAL PAIN: 1
CHOKING: 0
EYE REDNESS: 0
FACIAL SWELLING: 0
SORE THROAT: 0
NAUSEA: 1
VOMITING: 1
BACK PAIN: 0
SHORTNESS OF BREATH: 0
EYE DISCHARGE: 0

## 2019-11-07 ASSESSMENT — PAIN SCALES - GENERAL
PAINLEVEL_OUTOF10: 2
PAINLEVEL_OUTOF10: 9
PAINLEVEL_OUTOF10: 8
PAINLEVEL_OUTOF10: 7
PAINLEVEL_OUTOF10: 7
PAINLEVEL_OUTOF10: 9
PAINLEVEL_OUTOF10: 9

## 2019-11-07 ASSESSMENT — PAIN DESCRIPTION - FREQUENCY
FREQUENCY: INTERMITTENT
FREQUENCY: CONTINUOUS

## 2019-11-07 ASSESSMENT — PAIN DESCRIPTION - PAIN TYPE
TYPE: ACUTE PAIN

## 2019-11-07 ASSESSMENT — PAIN DESCRIPTION - ORIENTATION
ORIENTATION: RIGHT;LOWER
ORIENTATION: RIGHT
ORIENTATION: RIGHT;LOWER
ORIENTATION: RIGHT;LOWER

## 2019-11-07 ASSESSMENT — PAIN DESCRIPTION - DESCRIPTORS
DESCRIPTORS: ACHING;SHARP
DESCRIPTORS: ACHING;SHARP
DESCRIPTORS: ACHING
DESCRIPTORS: SHARP

## 2019-11-07 ASSESSMENT — PAIN DESCRIPTION - LOCATION
LOCATION: BACK;FLANK
LOCATION: ABDOMEN

## 2019-11-07 ASSESSMENT — PAIN DESCRIPTION - ONSET
ONSET: SUDDEN
ONSET: ON-GOING

## 2019-11-07 ASSESSMENT — PAIN DESCRIPTION - PROGRESSION
CLINICAL_PROGRESSION: GRADUALLY IMPROVING
CLINICAL_PROGRESSION: GRADUALLY WORSENING
CLINICAL_PROGRESSION: GRADUALLY IMPROVING

## 2019-11-08 LAB
ALBUMIN SERPL-MCNC: 3.7 G/DL (ref 3.4–5)
ALP BLD-CCNC: 127 U/L (ref 40–129)
ALT SERPL-CCNC: 99 U/L (ref 10–40)
ANION GAP SERPL CALCULATED.3IONS-SCNC: 12 MMOL/L (ref 3–16)
AST SERPL-CCNC: 106 U/L (ref 15–37)
BASOPHILS ABSOLUTE: 0.1 K/UL (ref 0–0.2)
BASOPHILS RELATIVE PERCENT: 0.9 %
BILIRUB SERPL-MCNC: 0.5 MG/DL (ref 0–1)
BILIRUBIN DIRECT: 0.3 MG/DL (ref 0–0.3)
BILIRUBIN, INDIRECT: 0.2 MG/DL (ref 0–1)
BUN BLDV-MCNC: 15 MG/DL (ref 7–20)
CALCIUM SERPL-MCNC: 9.2 MG/DL (ref 8.3–10.6)
CHLORIDE BLD-SCNC: 102 MMOL/L (ref 99–110)
CO2: 24 MMOL/L (ref 21–32)
CREAT SERPL-MCNC: 0.7 MG/DL (ref 0.6–1.1)
EOSINOPHILS ABSOLUTE: 0.3 K/UL (ref 0–0.6)
EOSINOPHILS RELATIVE PERCENT: 4.4 %
GFR AFRICAN AMERICAN: >60
GFR NON-AFRICAN AMERICAN: >60
GLUCOSE BLD-MCNC: 107 MG/DL (ref 70–99)
HCT VFR BLD CALC: 39.1 % (ref 36–48)
HEMOGLOBIN: 13.5 G/DL (ref 12–16)
LACTIC ACID: 0.9 MMOL/L (ref 0.4–2)
LIPASE: 29 U/L (ref 13–60)
LYMPHOCYTES ABSOLUTE: 3.1 K/UL (ref 1–5.1)
LYMPHOCYTES RELATIVE PERCENT: 47.7 %
MCH RBC QN AUTO: 30.4 PG (ref 26–34)
MCHC RBC AUTO-ENTMCNC: 34.6 G/DL (ref 31–36)
MCV RBC AUTO: 87.9 FL (ref 80–100)
MONOCYTES ABSOLUTE: 0.5 K/UL (ref 0–1.3)
MONOCYTES RELATIVE PERCENT: 7.1 %
NEUTROPHILS ABSOLUTE: 2.6 K/UL (ref 1.7–7.7)
NEUTROPHILS RELATIVE PERCENT: 39.9 %
ORGANISM: ABNORMAL
PDW BLD-RTO: 13.7 % (ref 12.4–15.4)
PLATELET # BLD: 229 K/UL (ref 135–450)
PMV BLD AUTO: 7.4 FL (ref 5–10.5)
POTASSIUM REFLEX MAGNESIUM: 3.9 MMOL/L (ref 3.5–5.1)
PROCALCITONIN: 0.07 NG/ML (ref 0–0.15)
RBC # BLD: 4.44 M/UL (ref 4–5.2)
SODIUM BLD-SCNC: 138 MMOL/L (ref 136–145)
TOTAL PROTEIN: 6.5 G/DL (ref 6.4–8.2)
URINE CULTURE, ROUTINE: ABNORMAL
URINE CULTURE, ROUTINE: ABNORMAL
WBC # BLD: 6.6 K/UL (ref 4–11)

## 2019-11-08 PROCEDURE — 84145 PROCALCITONIN (PCT): CPT

## 2019-11-08 PROCEDURE — 85025 COMPLETE CBC W/AUTO DIFF WBC: CPT

## 2019-11-08 PROCEDURE — 94760 N-INVAS EAR/PLS OXIMETRY 1: CPT

## 2019-11-08 PROCEDURE — 83605 ASSAY OF LACTIC ACID: CPT

## 2019-11-08 PROCEDURE — 83690 ASSAY OF LIPASE: CPT

## 2019-11-08 PROCEDURE — 80048 BASIC METABOLIC PNL TOTAL CA: CPT

## 2019-11-08 PROCEDURE — 2580000003 HC RX 258: Performed by: NURSE PRACTITIONER

## 2019-11-08 PROCEDURE — 2580000003 HC RX 258: Performed by: INTERNAL MEDICINE

## 2019-11-08 PROCEDURE — 6360000002 HC RX W HCPCS: Performed by: NURSE PRACTITIONER

## 2019-11-08 PROCEDURE — 6360000002 HC RX W HCPCS: Performed by: INTERNAL MEDICINE

## 2019-11-08 PROCEDURE — 99253 IP/OBS CNSLTJ NEW/EST LOW 45: CPT | Performed by: INTERNAL MEDICINE

## 2019-11-08 PROCEDURE — 80076 HEPATIC FUNCTION PANEL: CPT

## 2019-11-08 PROCEDURE — 6370000000 HC RX 637 (ALT 250 FOR IP): Performed by: NURSE PRACTITIONER

## 2019-11-08 PROCEDURE — 6360000002 HC RX W HCPCS: Performed by: HOSPITALIST

## 2019-11-08 PROCEDURE — 1200000000 HC SEMI PRIVATE

## 2019-11-08 PROCEDURE — 36415 COLL VENOUS BLD VENIPUNCTURE: CPT

## 2019-11-08 RX ORDER — PROMETHAZINE HYDROCHLORIDE 25 MG/ML
6.25 INJECTION, SOLUTION INTRAMUSCULAR; INTRAVENOUS EVERY 6 HOURS PRN
Status: DISCONTINUED | OUTPATIENT
Start: 2019-11-08 | End: 2019-11-08

## 2019-11-08 RX ADMIN — HYDROMORPHONE HYDROCHLORIDE 0.5 MG: 1 INJECTION, SOLUTION INTRAMUSCULAR; INTRAVENOUS; SUBCUTANEOUS at 05:35

## 2019-11-08 RX ADMIN — HYDROMORPHONE HYDROCHLORIDE 0.5 MG: 1 INJECTION, SOLUTION INTRAMUSCULAR; INTRAVENOUS; SUBCUTANEOUS at 08:49

## 2019-11-08 RX ADMIN — HYDROMORPHONE HYDROCHLORIDE 0.5 MG: 1 INJECTION, SOLUTION INTRAMUSCULAR; INTRAVENOUS; SUBCUTANEOUS at 20:24

## 2019-11-08 RX ADMIN — HYDROMORPHONE HYDROCHLORIDE 0.5 MG: 1 INJECTION, SOLUTION INTRAMUSCULAR; INTRAVENOUS; SUBCUTANEOUS at 17:10

## 2019-11-08 RX ADMIN — OXYCODONE HYDROCHLORIDE AND ACETAMINOPHEN 2 TABLET: 5; 325 TABLET ORAL at 04:18

## 2019-11-08 RX ADMIN — ENOXAPARIN SODIUM 40 MG: 40 INJECTION SUBCUTANEOUS at 08:15

## 2019-11-08 RX ADMIN — Medication 6.25 MG: at 17:46

## 2019-11-08 RX ADMIN — SODIUM CHLORIDE, POTASSIUM CHLORIDE, SODIUM LACTATE AND CALCIUM CHLORIDE: 600; 310; 30; 20 INJECTION, SOLUTION INTRAVENOUS at 13:54

## 2019-11-08 RX ADMIN — MEROPENEM 500 MG: 500 INJECTION, POWDER, FOR SOLUTION INTRAVENOUS at 05:34

## 2019-11-08 RX ADMIN — ONDANSETRON 4 MG: 2 INJECTION INTRAMUSCULAR; INTRAVENOUS at 10:53

## 2019-11-08 RX ADMIN — HYDROMORPHONE HYDROCHLORIDE 0.5 MG: 1 INJECTION, SOLUTION INTRAMUSCULAR; INTRAVENOUS; SUBCUTANEOUS at 13:54

## 2019-11-08 RX ADMIN — ACETAMINOPHEN 650 MG: 325 TABLET ORAL at 17:10

## 2019-11-08 RX ADMIN — ONDANSETRON 4 MG: 2 INJECTION INTRAMUSCULAR; INTRAVENOUS at 04:20

## 2019-11-08 RX ADMIN — HYDROMORPHONE HYDROCHLORIDE 0.5 MG: 1 INJECTION, SOLUTION INTRAMUSCULAR; INTRAVENOUS; SUBCUTANEOUS at 23:19

## 2019-11-08 RX ADMIN — HYDROMORPHONE HYDROCHLORIDE 0.5 MG: 1 INJECTION, SOLUTION INTRAMUSCULAR; INTRAVENOUS; SUBCUTANEOUS at 11:24

## 2019-11-08 RX ADMIN — MEROPENEM 500 MG: 500 INJECTION, POWDER, FOR SOLUTION INTRAVENOUS at 17:10

## 2019-11-08 RX ADMIN — PHENAZOPYRIDINE 200 MG: 200 TABLET ORAL at 08:15

## 2019-11-08 RX ADMIN — MEROPENEM 500 MG: 500 INJECTION, POWDER, FOR SOLUTION INTRAVENOUS at 23:18

## 2019-11-08 RX ADMIN — PHENAZOPYRIDINE 200 MG: 200 TABLET ORAL at 11:24

## 2019-11-08 RX ADMIN — HYDROMORPHONE HYDROCHLORIDE 0.5 MG: 1 INJECTION, SOLUTION INTRAMUSCULAR; INTRAVENOUS; SUBCUTANEOUS at 02:02

## 2019-11-08 RX ADMIN — SODIUM CHLORIDE, PRESERVATIVE FREE 10 ML: 5 INJECTION INTRAVENOUS at 08:16

## 2019-11-08 RX ADMIN — MEROPENEM 500 MG: 500 INJECTION, POWDER, FOR SOLUTION INTRAVENOUS at 11:24

## 2019-11-08 ASSESSMENT — PAIN DESCRIPTION - PAIN TYPE
TYPE: ACUTE PAIN

## 2019-11-08 ASSESSMENT — PAIN - FUNCTIONAL ASSESSMENT
PAIN_FUNCTIONAL_ASSESSMENT: PREVENTS OR INTERFERES SOME ACTIVE ACTIVITIES AND ADLS

## 2019-11-08 ASSESSMENT — PAIN DESCRIPTION - DESCRIPTORS
DESCRIPTORS: SHARP;CRAMPING
DESCRIPTORS: SHARP
DESCRIPTORS: ACHING
DESCRIPTORS: SHARP
DESCRIPTORS: SHARP
DESCRIPTORS: SHARP;CRAMPING
DESCRIPTORS: SHARP

## 2019-11-08 ASSESSMENT — PAIN DESCRIPTION - LOCATION
LOCATION: BACK;FLANK
LOCATION: ABDOMEN;BACK;FLANK
LOCATION: BACK;FLANK
LOCATION: ABDOMEN;BACK;FLANK

## 2019-11-08 ASSESSMENT — PAIN DESCRIPTION - ORIENTATION
ORIENTATION: RIGHT

## 2019-11-08 ASSESSMENT — PAIN DESCRIPTION - FREQUENCY
FREQUENCY: INTERMITTENT

## 2019-11-08 ASSESSMENT — PAIN DESCRIPTION - PROGRESSION
CLINICAL_PROGRESSION: GRADUALLY IMPROVING

## 2019-11-08 ASSESSMENT — PAIN SCALES - GENERAL
PAINLEVEL_OUTOF10: 7
PAINLEVEL_OUTOF10: 8
PAINLEVEL_OUTOF10: 5
PAINLEVEL_OUTOF10: 9
PAINLEVEL_OUTOF10: 9
PAINLEVEL_OUTOF10: 5
PAINLEVEL_OUTOF10: 9
PAINLEVEL_OUTOF10: 10
PAINLEVEL_OUTOF10: 0
PAINLEVEL_OUTOF10: 9
PAINLEVEL_OUTOF10: 2
PAINLEVEL_OUTOF10: 5
PAINLEVEL_OUTOF10: 0
PAINLEVEL_OUTOF10: 0
PAINLEVEL_OUTOF10: 6
PAINLEVEL_OUTOF10: 9
PAINLEVEL_OUTOF10: 0
PAINLEVEL_OUTOF10: 0

## 2019-11-08 ASSESSMENT — PAIN DESCRIPTION - ONSET
ONSET: AWAKENED FROM SLEEP
ONSET: ON-GOING
ONSET: AWAKENED FROM SLEEP

## 2019-11-09 ENCOUNTER — APPOINTMENT (OUTPATIENT)
Dept: GENERAL RADIOLOGY | Age: 39
DRG: 463 | End: 2019-11-09
Payer: COMMERCIAL

## 2019-11-09 LAB
ALBUMIN SERPL-MCNC: 4 G/DL (ref 3.4–5)
ANION GAP SERPL CALCULATED.3IONS-SCNC: 15 MMOL/L (ref 3–16)
BASOPHILS ABSOLUTE: 0.1 K/UL (ref 0–0.2)
BASOPHILS RELATIVE PERCENT: 0.9 %
BLOOD CULTURE, ROUTINE: NORMAL
BUN BLDV-MCNC: 9 MG/DL (ref 7–20)
CALCIUM SERPL-MCNC: 8.9 MG/DL (ref 8.3–10.6)
CHLORIDE BLD-SCNC: 100 MMOL/L (ref 99–110)
CO2: 24 MMOL/L (ref 21–32)
CREAT SERPL-MCNC: 0.7 MG/DL (ref 0.6–1.1)
CULTURE, BLOOD 2: NORMAL
EOSINOPHILS ABSOLUTE: 0.4 K/UL (ref 0–0.6)
EOSINOPHILS RELATIVE PERCENT: 6.5 %
GFR AFRICAN AMERICAN: >60
GFR NON-AFRICAN AMERICAN: >60
GLUCOSE BLD-MCNC: 149 MG/DL (ref 70–99)
HCT VFR BLD CALC: 38.4 % (ref 36–48)
HEMOGLOBIN: 13.5 G/DL (ref 12–16)
LYMPHOCYTES ABSOLUTE: 3.8 K/UL (ref 1–5.1)
LYMPHOCYTES RELATIVE PERCENT: 57.7 %
MCH RBC QN AUTO: 31 PG (ref 26–34)
MCHC RBC AUTO-ENTMCNC: 35.1 G/DL (ref 31–36)
MCV RBC AUTO: 88.2 FL (ref 80–100)
MONOCYTES ABSOLUTE: 0.4 K/UL (ref 0–1.3)
MONOCYTES RELATIVE PERCENT: 6.5 %
NEUTROPHILS ABSOLUTE: 1.9 K/UL (ref 1.7–7.7)
NEUTROPHILS RELATIVE PERCENT: 28.4 %
ORGANISM: ABNORMAL
PDW BLD-RTO: 13.3 % (ref 12.4–15.4)
PHOSPHORUS: 4 MG/DL (ref 2.5–4.9)
PLATELET # BLD: 209 K/UL (ref 135–450)
PMV BLD AUTO: 7.8 FL (ref 5–10.5)
POTASSIUM SERPL-SCNC: 3.7 MMOL/L (ref 3.5–5.1)
RBC # BLD: 4.36 M/UL (ref 4–5.2)
SODIUM BLD-SCNC: 139 MMOL/L (ref 136–145)
URINE CULTURE, ROUTINE: ABNORMAL
WBC # BLD: 6.5 K/UL (ref 4–11)

## 2019-11-09 PROCEDURE — C1769 GUIDE WIRE: HCPCS

## 2019-11-09 PROCEDURE — 80069 RENAL FUNCTION PANEL: CPT

## 2019-11-09 PROCEDURE — 6370000000 HC RX 637 (ALT 250 FOR IP): Performed by: NURSE PRACTITIONER

## 2019-11-09 PROCEDURE — C1751 CATH, INF, PER/CENT/MIDLINE: HCPCS

## 2019-11-09 PROCEDURE — 1200000000 HC SEMI PRIVATE

## 2019-11-09 PROCEDURE — 2580000003 HC RX 258: Performed by: NURSE PRACTITIONER

## 2019-11-09 PROCEDURE — 6360000002 HC RX W HCPCS: Performed by: INTERNAL MEDICINE

## 2019-11-09 PROCEDURE — 36415 COLL VENOUS BLD VENIPUNCTURE: CPT

## 2019-11-09 PROCEDURE — 94760 N-INVAS EAR/PLS OXIMETRY 1: CPT

## 2019-11-09 PROCEDURE — 76937 US GUIDE VASCULAR ACCESS: CPT

## 2019-11-09 PROCEDURE — 36569 INSJ PICC 5 YR+ W/O IMAGING: CPT

## 2019-11-09 PROCEDURE — 6360000002 HC RX W HCPCS: Performed by: NURSE PRACTITIONER

## 2019-11-09 PROCEDURE — 6360000002 HC RX W HCPCS: Performed by: HOSPITALIST

## 2019-11-09 PROCEDURE — 85025 COMPLETE CBC W/AUTO DIFF WBC: CPT

## 2019-11-09 PROCEDURE — 71045 X-RAY EXAM CHEST 1 VIEW: CPT

## 2019-11-09 PROCEDURE — 2500000003 HC RX 250 WO HCPCS: Performed by: INTERNAL MEDICINE

## 2019-11-09 PROCEDURE — 99232 SBSQ HOSP IP/OBS MODERATE 35: CPT | Performed by: INTERNAL MEDICINE

## 2019-11-09 PROCEDURE — 02HV33Z INSERTION OF INFUSION DEVICE INTO SUPERIOR VENA CAVA, PERCUTANEOUS APPROACH: ICD-10-PCS | Performed by: INTERNAL MEDICINE

## 2019-11-09 RX ORDER — SODIUM CHLORIDE 0.9 % (FLUSH) 0.9 %
10 SYRINGE (ML) INJECTION EVERY 12 HOURS SCHEDULED
Status: DISCONTINUED | OUTPATIENT
Start: 2019-11-09 | End: 2019-11-09 | Stop reason: SDUPTHER

## 2019-11-09 RX ORDER — SODIUM CHLORIDE 0.9 % (FLUSH) 0.9 %
10 SYRINGE (ML) INJECTION PRN
Status: DISCONTINUED | OUTPATIENT
Start: 2019-11-09 | End: 2019-11-09 | Stop reason: SDUPTHER

## 2019-11-09 RX ORDER — LIDOCAINE HYDROCHLORIDE 10 MG/ML
5 INJECTION, SOLUTION EPIDURAL; INFILTRATION; INTRACAUDAL; PERINEURAL ONCE
Status: COMPLETED | OUTPATIENT
Start: 2019-11-09 | End: 2019-11-09

## 2019-11-09 RX ADMIN — SODIUM CHLORIDE, PRESERVATIVE FREE 10 ML: 5 INJECTION INTRAVENOUS at 20:36

## 2019-11-09 RX ADMIN — OXYCODONE HYDROCHLORIDE AND ACETAMINOPHEN 2 TABLET: 5; 325 TABLET ORAL at 10:04

## 2019-11-09 RX ADMIN — OXYCODONE HYDROCHLORIDE AND ACETAMINOPHEN 2 TABLET: 5; 325 TABLET ORAL at 20:34

## 2019-11-09 RX ADMIN — HYDROMORPHONE HYDROCHLORIDE 0.5 MG: 1 INJECTION, SOLUTION INTRAMUSCULAR; INTRAVENOUS; SUBCUTANEOUS at 03:49

## 2019-11-09 RX ADMIN — SODIUM CHLORIDE, POTASSIUM CHLORIDE, SODIUM LACTATE AND CALCIUM CHLORIDE: 600; 310; 30; 20 INJECTION, SOLUTION INTRAVENOUS at 02:45

## 2019-11-09 RX ADMIN — LIDOCAINE HYDROCHLORIDE 5 ML: 10 INJECTION, SOLUTION EPIDURAL; INFILTRATION; INTRACAUDAL; PERINEURAL at 14:17

## 2019-11-09 RX ADMIN — HYDROMORPHONE HYDROCHLORIDE 0.25 MG: 1 INJECTION, SOLUTION INTRAMUSCULAR; INTRAVENOUS; SUBCUTANEOUS at 21:37

## 2019-11-09 RX ADMIN — HYDROMORPHONE HYDROCHLORIDE 0.25 MG: 1 INJECTION, SOLUTION INTRAMUSCULAR; INTRAVENOUS; SUBCUTANEOUS at 13:35

## 2019-11-09 RX ADMIN — HYDROMORPHONE HYDROCHLORIDE 0.25 MG: 1 INJECTION, SOLUTION INTRAMUSCULAR; INTRAVENOUS; SUBCUTANEOUS at 17:33

## 2019-11-09 RX ADMIN — HYDROMORPHONE HYDROCHLORIDE 0.5 MG: 1 INJECTION, SOLUTION INTRAMUSCULAR; INTRAVENOUS; SUBCUTANEOUS at 01:45

## 2019-11-09 RX ADMIN — ONDANSETRON 4 MG: 2 INJECTION INTRAMUSCULAR; INTRAVENOUS at 06:16

## 2019-11-09 RX ADMIN — HYDROMORPHONE HYDROCHLORIDE 0.5 MG: 1 INJECTION, SOLUTION INTRAMUSCULAR; INTRAVENOUS; SUBCUTANEOUS at 08:38

## 2019-11-09 RX ADMIN — HYDROMORPHONE HYDROCHLORIDE 0.5 MG: 1 INJECTION, SOLUTION INTRAMUSCULAR; INTRAVENOUS; SUBCUTANEOUS at 06:15

## 2019-11-09 RX ADMIN — ENOXAPARIN SODIUM 40 MG: 40 INJECTION SUBCUTANEOUS at 08:38

## 2019-11-09 RX ADMIN — MEROPENEM 500 MG: 500 INJECTION, POWDER, FOR SOLUTION INTRAVENOUS at 06:16

## 2019-11-09 RX ADMIN — MEROPENEM 500 MG: 500 INJECTION, POWDER, FOR SOLUTION INTRAVENOUS at 21:37

## 2019-11-09 RX ADMIN — OXYCODONE HYDROCHLORIDE AND ACETAMINOPHEN 2 TABLET: 5; 325 TABLET ORAL at 15:36

## 2019-11-09 RX ADMIN — OXYCODONE HYDROCHLORIDE AND ACETAMINOPHEN 2 TABLET: 5; 325 TABLET ORAL at 02:46

## 2019-11-09 RX ADMIN — MEROPENEM 500 MG: 500 INJECTION, POWDER, FOR SOLUTION INTRAVENOUS at 17:32

## 2019-11-09 RX ADMIN — MEROPENEM 500 MG: 500 INJECTION, POWDER, FOR SOLUTION INTRAVENOUS at 13:20

## 2019-11-09 ASSESSMENT — PAIN DESCRIPTION - DESCRIPTORS
DESCRIPTORS: THROBBING
DESCRIPTORS: THROBBING
DESCRIPTORS: SHARP
DESCRIPTORS: THROBBING
DESCRIPTORS: SHARP;CRAMPING
DESCRIPTORS: CRAMPING;SHARP

## 2019-11-09 ASSESSMENT — PAIN DESCRIPTION - PROGRESSION
CLINICAL_PROGRESSION: NOT CHANGED
CLINICAL_PROGRESSION: NOT CHANGED
CLINICAL_PROGRESSION: GRADUALLY WORSENING

## 2019-11-09 ASSESSMENT — PAIN SCALES - GENERAL
PAINLEVEL_OUTOF10: 8
PAINLEVEL_OUTOF10: 6
PAINLEVEL_OUTOF10: 6
PAINLEVEL_OUTOF10: 9
PAINLEVEL_OUTOF10: 6
PAINLEVEL_OUTOF10: 5
PAINLEVEL_OUTOF10: 8
PAINLEVEL_OUTOF10: 7
PAINLEVEL_OUTOF10: 5
PAINLEVEL_OUTOF10: 8
PAINLEVEL_OUTOF10: 9
PAINLEVEL_OUTOF10: 10
PAINLEVEL_OUTOF10: 8
PAINLEVEL_OUTOF10: 10
PAINLEVEL_OUTOF10: 0
PAINLEVEL_OUTOF10: 9
PAINLEVEL_OUTOF10: 0
PAINLEVEL_OUTOF10: 7
PAINLEVEL_OUTOF10: 0
PAINLEVEL_OUTOF10: 7
PAINLEVEL_OUTOF10: 6

## 2019-11-09 ASSESSMENT — PAIN - FUNCTIONAL ASSESSMENT
PAIN_FUNCTIONAL_ASSESSMENT: PREVENTS OR INTERFERES SOME ACTIVE ACTIVITIES AND ADLS

## 2019-11-09 ASSESSMENT — PAIN DESCRIPTION - PAIN TYPE
TYPE: ACUTE PAIN

## 2019-11-09 ASSESSMENT — PAIN DESCRIPTION - FREQUENCY
FREQUENCY: CONTINUOUS
FREQUENCY: INTERMITTENT
FREQUENCY: INTERMITTENT
FREQUENCY: CONTINUOUS

## 2019-11-09 ASSESSMENT — PAIN DESCRIPTION - LOCATION
LOCATION: ABDOMEN;FLANK
LOCATION: ABDOMEN;FLANK
LOCATION: BACK;FLANK;ABDOMEN
LOCATION: ABDOMEN;FLANK
LOCATION: ABDOMEN;FLANK;BACK
LOCATION: ABDOMEN;BACK;FLANK
LOCATION: ABDOMEN;BACK;FLANK
LOCATION: ABDOMEN;FLANK;BACK

## 2019-11-09 ASSESSMENT — PAIN DESCRIPTION - ONSET
ONSET: ON-GOING

## 2019-11-09 ASSESSMENT — PAIN DESCRIPTION - ORIENTATION
ORIENTATION: RIGHT

## 2019-11-10 VITALS
OXYGEN SATURATION: 92 % | BODY MASS INDEX: 33.78 KG/M2 | HEIGHT: 59 IN | TEMPERATURE: 98.3 F | RESPIRATION RATE: 16 BRPM | WEIGHT: 167.55 LBS | HEART RATE: 78 BPM | SYSTOLIC BLOOD PRESSURE: 107 MMHG | DIASTOLIC BLOOD PRESSURE: 71 MMHG

## 2019-11-10 PROCEDURE — 6360000002 HC RX W HCPCS: Performed by: INTERNAL MEDICINE

## 2019-11-10 PROCEDURE — 6370000000 HC RX 637 (ALT 250 FOR IP): Performed by: NURSE PRACTITIONER

## 2019-11-10 PROCEDURE — 97116 GAIT TRAINING THERAPY: CPT

## 2019-11-10 PROCEDURE — 6360000002 HC RX W HCPCS: Performed by: NURSE PRACTITIONER

## 2019-11-10 PROCEDURE — 94760 N-INVAS EAR/PLS OXIMETRY 1: CPT

## 2019-11-10 PROCEDURE — 97161 PT EVAL LOW COMPLEX 20 MIN: CPT

## 2019-11-10 PROCEDURE — 2580000003 HC RX 258: Performed by: NURSE PRACTITIONER

## 2019-11-10 RX ORDER — OXYCODONE HYDROCHLORIDE AND ACETAMINOPHEN 5; 325 MG/1; MG/1
1 TABLET ORAL EVERY 8 HOURS PRN
Qty: 9 TABLET | Refills: 0 | Status: SHIPPED | OUTPATIENT
Start: 2019-11-10 | End: 2019-11-13

## 2019-11-10 RX ADMIN — OXYCODONE HYDROCHLORIDE AND ACETAMINOPHEN 2 TABLET: 5; 325 TABLET ORAL at 08:58

## 2019-11-10 RX ADMIN — HYDROMORPHONE HYDROCHLORIDE 0.25 MG: 1 INJECTION, SOLUTION INTRAMUSCULAR; INTRAVENOUS; SUBCUTANEOUS at 01:37

## 2019-11-10 RX ADMIN — MEROPENEM 500 MG: 500 INJECTION, POWDER, FOR SOLUTION INTRAVENOUS at 10:23

## 2019-11-10 RX ADMIN — OXYCODONE HYDROCHLORIDE AND ACETAMINOPHEN 2 TABLET: 5; 325 TABLET ORAL at 00:50

## 2019-11-10 RX ADMIN — Medication 6.25 MG: at 06:52

## 2019-11-10 RX ADMIN — HYDROMORPHONE HYDROCHLORIDE 0.25 MG: 1 INJECTION, SOLUTION INTRAMUSCULAR; INTRAVENOUS; SUBCUTANEOUS at 05:45

## 2019-11-10 RX ADMIN — SODIUM CHLORIDE, PRESERVATIVE FREE 10 ML: 5 INJECTION INTRAVENOUS at 08:59

## 2019-11-10 RX ADMIN — ONDANSETRON 4 MG: 2 INJECTION INTRAMUSCULAR; INTRAVENOUS at 00:52

## 2019-11-10 RX ADMIN — OXYCODONE HYDROCHLORIDE AND ACETAMINOPHEN 2 TABLET: 5; 325 TABLET ORAL at 04:38

## 2019-11-10 RX ADMIN — MEROPENEM 500 MG: 500 INJECTION, POWDER, FOR SOLUTION INTRAVENOUS at 04:37

## 2019-11-10 RX ADMIN — HYDROMORPHONE HYDROCHLORIDE 0.25 MG: 1 INJECTION, SOLUTION INTRAMUSCULAR; INTRAVENOUS; SUBCUTANEOUS at 10:22

## 2019-11-10 RX ADMIN — ENOXAPARIN SODIUM 40 MG: 40 INJECTION SUBCUTANEOUS at 08:58

## 2019-11-10 RX ADMIN — OXYCODONE HYDROCHLORIDE AND ACETAMINOPHEN 2 TABLET: 5; 325 TABLET ORAL at 13:15

## 2019-11-10 ASSESSMENT — PAIN SCALES - GENERAL
PAINLEVEL_OUTOF10: 8
PAINLEVEL_OUTOF10: 4
PAINLEVEL_OUTOF10: 8
PAINLEVEL_OUTOF10: 4
PAINLEVEL_OUTOF10: 8
PAINLEVEL_OUTOF10: 0
PAINLEVEL_OUTOF10: 5
PAINLEVEL_OUTOF10: 7

## 2019-11-12 ENCOUNTER — TELEPHONE (OUTPATIENT)
Dept: INFECTIOUS DISEASES | Age: 39
End: 2019-11-12

## 2019-11-12 ENCOUNTER — APPOINTMENT (OUTPATIENT)
Dept: CT IMAGING | Age: 39
End: 2019-11-12
Payer: COMMERCIAL

## 2019-11-12 ENCOUNTER — HOSPITAL ENCOUNTER (EMERGENCY)
Age: 39
Discharge: HOME | End: 2019-11-12
Attending: EMERGENCY MEDICINE
Payer: COMMERCIAL

## 2019-11-12 VITALS
WEIGHT: 169.09 LBS | SYSTOLIC BLOOD PRESSURE: 127 MMHG | HEART RATE: 95 BPM | HEIGHT: 59 IN | DIASTOLIC BLOOD PRESSURE: 92 MMHG | BODY MASS INDEX: 34.09 KG/M2 | OXYGEN SATURATION: 100 % | TEMPERATURE: 98.8 F | RESPIRATION RATE: 16 BRPM

## 2019-11-12 DIAGNOSIS — R10.31 RIGHT LOWER QUADRANT ABDOMINAL PAIN: Primary | ICD-10-CM

## 2019-11-12 LAB
A/G RATIO: 1.7 (ref 1.1–2.2)
ALBUMIN SERPL-MCNC: 4.5 G/DL (ref 3.4–5)
ALP BLD-CCNC: 129 U/L (ref 40–129)
ALT SERPL-CCNC: 77 U/L (ref 10–40)
ANION GAP SERPL CALCULATED.3IONS-SCNC: 16 MMOL/L (ref 3–16)
AST SERPL-CCNC: 28 U/L (ref 15–37)
BASOPHILS ABSOLUTE: 0.1 K/UL (ref 0–0.2)
BASOPHILS RELATIVE PERCENT: 1.1 %
BILIRUB SERPL-MCNC: <0.2 MG/DL (ref 0–1)
BILIRUBIN URINE: NEGATIVE
BLOOD, URINE: NEGATIVE
BUN BLDV-MCNC: 11 MG/DL (ref 7–20)
CALCIUM SERPL-MCNC: 9.3 MG/DL (ref 8.3–10.6)
CHLORIDE BLD-SCNC: 98 MMOL/L (ref 99–110)
CLARITY: CLEAR
CO2: 23 MMOL/L (ref 21–32)
COLOR: YELLOW
CREAT SERPL-MCNC: 0.6 MG/DL (ref 0.6–1.1)
EOSINOPHILS ABSOLUTE: 0.5 K/UL (ref 0–0.6)
EOSINOPHILS RELATIVE PERCENT: 5.8 %
EPITHELIAL CELLS, UA: ABNORMAL /HPF
GFR AFRICAN AMERICAN: >60
GFR NON-AFRICAN AMERICAN: >60
GLOBULIN: 2.7 G/DL
GLUCOSE BLD-MCNC: 165 MG/DL (ref 70–99)
GLUCOSE URINE: NEGATIVE MG/DL
HCG QUALITATIVE: NEGATIVE
HCT VFR BLD CALC: 43.9 % (ref 36–48)
HEMOGLOBIN: 14.8 G/DL (ref 12–16)
KETONES, URINE: NEGATIVE MG/DL
LACTIC ACID: 1.8 MMOL/L (ref 0.4–2)
LEUKOCYTE ESTERASE, URINE: ABNORMAL
LIPASE: 14 U/L (ref 13–60)
LYMPHOCYTES ABSOLUTE: 4.1 K/UL (ref 1–5.1)
LYMPHOCYTES RELATIVE PERCENT: 47.4 %
MAGNESIUM: 1.9 MG/DL (ref 1.8–2.4)
MCH RBC QN AUTO: 30 PG (ref 26–34)
MCHC RBC AUTO-ENTMCNC: 33.8 G/DL (ref 31–36)
MCV RBC AUTO: 88.6 FL (ref 80–100)
MICROSCOPIC EXAMINATION: YES
MONOCYTES ABSOLUTE: 0.5 K/UL (ref 0–1.3)
MONOCYTES RELATIVE PERCENT: 6 %
NEUTROPHILS ABSOLUTE: 3.4 K/UL (ref 1.7–7.7)
NEUTROPHILS RELATIVE PERCENT: 39.7 %
NITRITE, URINE: NEGATIVE
PDW BLD-RTO: 13.6 % (ref 12.4–15.4)
PH UA: 7 (ref 5–8)
PLATELET # BLD: 261 K/UL (ref 135–450)
PMV BLD AUTO: 7.7 FL (ref 5–10.5)
POTASSIUM REFLEX MAGNESIUM: 3.5 MMOL/L (ref 3.5–5.1)
PROTEIN UA: NEGATIVE MG/DL
RBC # BLD: 4.95 M/UL (ref 4–5.2)
RBC UA: ABNORMAL /HPF (ref 0–2)
SODIUM BLD-SCNC: 137 MMOL/L (ref 136–145)
SPECIFIC GRAVITY UA: <1.005 (ref 1–1.03)
TOTAL PROTEIN: 7.2 G/DL (ref 6.4–8.2)
URINE REFLEX TO CULTURE: YES
URINE TYPE: ABNORMAL
UROBILINOGEN, URINE: 0.2 E.U./DL
WBC # BLD: 8.6 K/UL (ref 4–11)
WBC UA: ABNORMAL /HPF (ref 0–5)
YEAST: PRESENT /HPF

## 2019-11-12 PROCEDURE — 96374 THER/PROPH/DIAG INJ IV PUSH: CPT

## 2019-11-12 PROCEDURE — 6360000004 HC RX CONTRAST MEDICATION: Performed by: EMERGENCY MEDICINE

## 2019-11-12 PROCEDURE — 83605 ASSAY OF LACTIC ACID: CPT

## 2019-11-12 PROCEDURE — 99284 EMERGENCY DEPT VISIT MOD MDM: CPT

## 2019-11-12 PROCEDURE — 81001 URINALYSIS AUTO W/SCOPE: CPT

## 2019-11-12 PROCEDURE — 83735 ASSAY OF MAGNESIUM: CPT

## 2019-11-12 PROCEDURE — 70450 CT HEAD/BRAIN W/O DYE: CPT

## 2019-11-12 PROCEDURE — 85025 COMPLETE CBC W/AUTO DIFF WBC: CPT

## 2019-11-12 PROCEDURE — 87086 URINE CULTURE/COLONY COUNT: CPT

## 2019-11-12 PROCEDURE — 74177 CT ABD & PELVIS W/CONTRAST: CPT

## 2019-11-12 PROCEDURE — 80053 COMPREHEN METABOLIC PANEL: CPT

## 2019-11-12 PROCEDURE — 83690 ASSAY OF LIPASE: CPT

## 2019-11-12 PROCEDURE — 84703 CHORIONIC GONADOTROPIN ASSAY: CPT

## 2019-11-12 PROCEDURE — 6370000000 HC RX 637 (ALT 250 FOR IP): Performed by: EMERGENCY MEDICINE

## 2019-11-12 PROCEDURE — 6360000002 HC RX W HCPCS: Performed by: EMERGENCY MEDICINE

## 2019-11-12 RX ORDER — ONDANSETRON HYDROCHLORIDE 8 MG/1
8 TABLET, FILM COATED ORAL EVERY 8 HOURS PRN
Qty: 20 TABLET | Refills: 0 | Status: SHIPPED | OUTPATIENT
Start: 2019-11-12 | End: 2019-12-05

## 2019-11-12 RX ORDER — OXYCODONE HYDROCHLORIDE AND ACETAMINOPHEN 5; 325 MG/1; MG/1
1 TABLET ORAL ONCE
Status: COMPLETED | OUTPATIENT
Start: 2019-11-12 | End: 2019-11-12

## 2019-11-12 RX ORDER — ONDANSETRON 2 MG/ML
4 INJECTION INTRAMUSCULAR; INTRAVENOUS ONCE
Status: COMPLETED | OUTPATIENT
Start: 2019-11-12 | End: 2019-11-12

## 2019-11-12 RX ADMIN — ONDANSETRON 4 MG: 2 INJECTION INTRAMUSCULAR; INTRAVENOUS at 03:32

## 2019-11-12 RX ADMIN — IOPAMIDOL 75 ML: 755 INJECTION, SOLUTION INTRAVENOUS at 04:09

## 2019-11-12 RX ADMIN — OXYCODONE HYDROCHLORIDE AND ACETAMINOPHEN 1 TABLET: 5; 325 TABLET ORAL at 04:31

## 2019-11-12 ASSESSMENT — PAIN DESCRIPTION - PROGRESSION: CLINICAL_PROGRESSION: NOT CHANGED

## 2019-11-12 ASSESSMENT — PAIN DESCRIPTION - DESCRIPTORS: DESCRIPTORS: SHARP

## 2019-11-12 ASSESSMENT — PAIN DESCRIPTION - PAIN TYPE: TYPE: ACUTE PAIN

## 2019-11-12 ASSESSMENT — PAIN DESCRIPTION - ORIENTATION: ORIENTATION: LOWER

## 2019-11-12 ASSESSMENT — PAIN DESCRIPTION - LOCATION: LOCATION: ABDOMEN

## 2019-11-12 ASSESSMENT — PAIN SCALES - GENERAL
PAINLEVEL_OUTOF10: 10
PAINLEVEL_OUTOF10: 10
PAINLEVEL_OUTOF10: 0

## 2019-11-12 ASSESSMENT — PAIN - FUNCTIONAL ASSESSMENT: PAIN_FUNCTIONAL_ASSESSMENT: PREVENTS OR INTERFERES SOME ACTIVE ACTIVITIES AND ADLS

## 2019-11-12 ASSESSMENT — PAIN DESCRIPTION - FREQUENCY: FREQUENCY: CONTINUOUS

## 2019-11-12 ASSESSMENT — PAIN DESCRIPTION - ONSET: ONSET: GRADUAL

## 2019-11-13 LAB — URINE CULTURE, ROUTINE: NORMAL

## 2019-11-18 LAB
ALBUMIN SERPL-MCNC: 4 G/DL (ref 3.5–5)
ALP BLD-CCNC: 86 IU/L (ref 35–135)
ALT SERPL-CCNC: 33 IU/L (ref 10–60)
ANION GAP SERPL CALCULATED.3IONS-SCNC: 12 MMOL/L (ref 6–18)
AST SERPL-CCNC: 25 IU/L (ref 10–40)
BASOPHILS ABSOLUTE: 0.2 THOU/MCL (ref 0–0.2)
BASOPHILS ABSOLUTE: 2 %
BILIRUB SERPL-MCNC: 0.3 MG/DL (ref 0–1.2)
BUN BLDV-MCNC: 14 MG/DL (ref 8–26)
CALCIUM SERPL-MCNC: 9.8 MG/DL (ref 8.5–10.5)
CHLORIDE BLD-SCNC: 102 MEQ/L (ref 101–111)
CO2: 25 MMOL/L (ref 24–36)
CREAT SERPL-MCNC: 0.64 MG/DL (ref 0.44–1.03)
EOSINOPHILS ABSOLUTE: 0.4 THOU/MCL (ref 0.03–0.45)
EOSINOPHILS RELATIVE PERCENT: 6 %
GFR AFRICAN AMERICAN: 130 ML/MIN/1.73 M2
GFR NON-AFRICAN AMERICAN: 113 ML/MIN/1.73 M2
GLUCOSE BLD-MCNC: 96 MG/DL (ref 70–99)
HCT VFR BLD CALC: 43.7 % (ref 36–46)
HEMOGLOBIN: 14.8 G/DL (ref 12–15.2)
LYMPHOCYTES ABSOLUTE: 3.7 THOU/MCL (ref 1–4)
LYMPHOCYTES RELATIVE PERCENT: 45 %
MCH RBC QN AUTO: 29.8 PG (ref 27–33)
MCHC RBC AUTO-ENTMCNC: 33.7 G/DL (ref 32–36)
MCV RBC AUTO: 88.3 FL (ref 82–97)
MONOCYTES # BLD: 6 %
MONOCYTES ABSOLUTE: 0.4 THOU/MCL (ref 0.2–0.9)
NEUTROPHILS ABSOLUTE: 3.2 THOU/MCL (ref 1.8–7.7)
PDW BLD-RTO: 12.4 %
PLATELET # BLD: 307 THOU/MCL (ref 140–375)
PMV BLD AUTO: 8.7 FL (ref 7.4–11.5)
POTASSIUM SERPL-SCNC: 4.2 MEQ/L (ref 3.6–5.1)
RBC # BLD: 4.95 MIL/MCL (ref 3.8–5.2)
SEG NEUTROPHILS: 41 %
SODIUM BLD-SCNC: 139 MEQ/L (ref 135–145)
TOTAL PROTEIN: 7.2 G/DL (ref 6–8)
WBC # BLD: 7.9 THOU/MCL (ref 3.6–10.5)

## 2019-11-19 ENCOUNTER — HOSPITAL ENCOUNTER (EMERGENCY)
Age: 39
Discharge: HOME | End: 2019-11-19
Attending: EMERGENCY MEDICINE
Payer: COMMERCIAL

## 2019-11-19 ENCOUNTER — TELEPHONE (OUTPATIENT)
Dept: INFECTIOUS DISEASES | Age: 39
End: 2019-11-19

## 2019-11-19 ENCOUNTER — APPOINTMENT (OUTPATIENT)
Dept: GENERAL RADIOLOGY | Age: 39
End: 2019-11-19
Payer: COMMERCIAL

## 2019-11-19 VITALS
HEART RATE: 74 BPM | HEIGHT: 59 IN | RESPIRATION RATE: 16 BRPM | OXYGEN SATURATION: 97 % | SYSTOLIC BLOOD PRESSURE: 110 MMHG | WEIGHT: 159.17 LBS | DIASTOLIC BLOOD PRESSURE: 82 MMHG | BODY MASS INDEX: 32.09 KG/M2 | TEMPERATURE: 98.5 F

## 2019-11-19 DIAGNOSIS — M79.621 PAIN IN RIGHT UPPER ARM: Primary | ICD-10-CM

## 2019-11-19 LAB
ANION GAP SERPL CALCULATED.3IONS-SCNC: 13 MMOL/L (ref 3–16)
BASOPHILS ABSOLUTE: 0.1 K/UL (ref 0–0.2)
BASOPHILS RELATIVE PERCENT: 1.2 %
BILIRUBIN URINE: NEGATIVE
BLOOD, URINE: NEGATIVE
BUN BLDV-MCNC: 11 MG/DL (ref 7–20)
CALCIUM SERPL-MCNC: 10 MG/DL (ref 8.3–10.6)
CHLORIDE BLD-SCNC: 99 MMOL/L (ref 99–110)
CLARITY: CLEAR
CO2: 25 MMOL/L (ref 21–32)
COLOR: YELLOW
CREAT SERPL-MCNC: <0.5 MG/DL (ref 0.6–1.1)
D DIMER: 203 NG/ML DDU (ref 0–229)
EOSINOPHILS ABSOLUTE: 0.4 K/UL (ref 0–0.6)
EOSINOPHILS RELATIVE PERCENT: 3.4 %
EPITHELIAL CELLS, UA: 3 /HPF (ref 0–5)
GFR AFRICAN AMERICAN: >60
GFR NON-AFRICAN AMERICAN: >60
GLUCOSE BLD-MCNC: 84 MG/DL (ref 70–99)
GLUCOSE URINE: NEGATIVE MG/DL
HCT VFR BLD CALC: 46.5 % (ref 36–48)
HEMATOLOGY PATH CONSULT: NO
HEMOGLOBIN: 16.2 G/DL (ref 12–16)
HYALINE CASTS: 4 /LPF (ref 0–8)
KETONES, URINE: NEGATIVE MG/DL
LACTIC ACID: 1.3 MMOL/L (ref 0.4–2)
LEUKOCYTE ESTERASE, URINE: ABNORMAL
LYMPHOCYTES ABSOLUTE: 5.7 K/UL (ref 1–5.1)
LYMPHOCYTES RELATIVE PERCENT: 49.5 %
MCH RBC QN AUTO: 30.3 PG (ref 26–34)
MCHC RBC AUTO-ENTMCNC: 34.9 G/DL (ref 31–36)
MCV RBC AUTO: 86.7 FL (ref 80–100)
MICROSCOPIC EXAMINATION: YES
MONOCYTES ABSOLUTE: 0.6 K/UL (ref 0–1.3)
MONOCYTES RELATIVE PERCENT: 5 %
NEUTROPHILS ABSOLUTE: 4.7 K/UL (ref 1.7–7.7)
NEUTROPHILS RELATIVE PERCENT: 40.9 %
NITRITE, URINE: NEGATIVE
PDW BLD-RTO: 13.4 % (ref 12.4–15.4)
PH UA: 6.5 (ref 5–8)
PLATELET # BLD: 311 K/UL (ref 135–450)
PMV BLD AUTO: 7.7 FL (ref 5–10.5)
POTASSIUM REFLEX MAGNESIUM: 3.9 MMOL/L (ref 3.5–5.1)
PROTEIN UA: NEGATIVE MG/DL
RBC # BLD: 5.36 M/UL (ref 4–5.2)
RBC UA: 4 /HPF (ref 0–4)
SODIUM BLD-SCNC: 137 MMOL/L (ref 136–145)
SPECIFIC GRAVITY UA: 1.01 (ref 1–1.03)
URINE REFLEX TO CULTURE: YES
URINE TYPE: ABNORMAL
UROBILINOGEN, URINE: 0.2 E.U./DL
WBC # BLD: 11.5 K/UL (ref 4–11)
WBC UA: 4 /HPF (ref 0–5)

## 2019-11-19 PROCEDURE — 87040 BLOOD CULTURE FOR BACTERIA: CPT

## 2019-11-19 PROCEDURE — 36415 COLL VENOUS BLD VENIPUNCTURE: CPT

## 2019-11-19 PROCEDURE — 85379 FIBRIN DEGRADATION QUANT: CPT

## 2019-11-19 PROCEDURE — 83605 ASSAY OF LACTIC ACID: CPT

## 2019-11-19 PROCEDURE — 51702 INSERT TEMP BLADDER CATH: CPT

## 2019-11-19 PROCEDURE — 71046 X-RAY EXAM CHEST 2 VIEWS: CPT

## 2019-11-19 PROCEDURE — 85025 COMPLETE CBC W/AUTO DIFF WBC: CPT

## 2019-11-19 PROCEDURE — 99283 EMERGENCY DEPT VISIT LOW MDM: CPT

## 2019-11-19 PROCEDURE — 6370000000 HC RX 637 (ALT 250 FOR IP): Performed by: EMERGENCY MEDICINE

## 2019-11-19 PROCEDURE — 87086 URINE CULTURE/COLONY COUNT: CPT

## 2019-11-19 PROCEDURE — 81001 URINALYSIS AUTO W/SCOPE: CPT

## 2019-11-19 PROCEDURE — 80048 BASIC METABOLIC PNL TOTAL CA: CPT

## 2019-11-19 RX ORDER — OXYCODONE HYDROCHLORIDE AND ACETAMINOPHEN 5; 325 MG/1; MG/1
2 TABLET ORAL ONCE
Status: COMPLETED | OUTPATIENT
Start: 2019-11-19 | End: 2019-11-19

## 2019-11-19 RX ADMIN — OXYCODONE HYDROCHLORIDE AND ACETAMINOPHEN 2 TABLET: 5; 325 TABLET ORAL at 18:26

## 2019-11-19 ASSESSMENT — PAIN DESCRIPTION - ORIENTATION: ORIENTATION: RIGHT

## 2019-11-19 ASSESSMENT — PAIN DESCRIPTION - FREQUENCY
FREQUENCY: CONTINUOUS
FREQUENCY: CONTINUOUS

## 2019-11-19 ASSESSMENT — PAIN SCALES - GENERAL
PAINLEVEL_OUTOF10: 7
PAINLEVEL_OUTOF10: 8
PAINLEVEL_OUTOF10: 7

## 2019-11-19 ASSESSMENT — PAIN DESCRIPTION - ONSET: ONSET: ON-GOING

## 2019-11-19 ASSESSMENT — PAIN DESCRIPTION - DESCRIPTORS
DESCRIPTORS: ACHING
DESCRIPTORS: ACHING

## 2019-11-19 ASSESSMENT — PAIN DESCRIPTION - PAIN TYPE
TYPE: ACUTE PAIN
TYPE: ACUTE PAIN

## 2019-11-19 ASSESSMENT — PAIN DESCRIPTION - LOCATION
LOCATION: HEAD
LOCATION: ARM

## 2019-11-19 ASSESSMENT — PAIN - FUNCTIONAL ASSESSMENT
PAIN_FUNCTIONAL_ASSESSMENT: ACTIVITIES ARE NOT PREVENTED
PAIN_FUNCTIONAL_ASSESSMENT: PREVENTS OR INTERFERES SOME ACTIVE ACTIVITIES AND ADLS

## 2019-11-19 ASSESSMENT — PAIN DESCRIPTION - PROGRESSION: CLINICAL_PROGRESSION: NOT CHANGED

## 2019-11-21 LAB — URINE CULTURE, ROUTINE: NORMAL

## 2019-11-24 LAB
BLOOD CULTURE, ROUTINE: NORMAL
CULTURE, BLOOD 2: NORMAL

## 2019-12-05 ENCOUNTER — PATIENT MESSAGE (OUTPATIENT)
Dept: INTERNAL MEDICINE CLINIC | Age: 39
End: 2019-12-05

## 2019-12-05 ENCOUNTER — OFFICE VISIT (OUTPATIENT)
Dept: INTERNAL MEDICINE CLINIC | Age: 39
End: 2019-12-05
Payer: COMMERCIAL

## 2019-12-05 VITALS
WEIGHT: 166 LBS | DIASTOLIC BLOOD PRESSURE: 77 MMHG | SYSTOLIC BLOOD PRESSURE: 117 MMHG | TEMPERATURE: 98.9 F | OXYGEN SATURATION: 97 % | HEIGHT: 59 IN | HEART RATE: 90 BPM | BODY MASS INDEX: 33.47 KG/M2 | RESPIRATION RATE: 16 BRPM

## 2019-12-05 DIAGNOSIS — Z01.818 PREOP EXAMINATION: Primary | ICD-10-CM

## 2019-12-05 DIAGNOSIS — K58.2 IRRITABLE BOWEL SYNDROME WITH BOTH CONSTIPATION AND DIARRHEA: ICD-10-CM

## 2019-12-05 DIAGNOSIS — F32.A ANXIETY AND DEPRESSION: ICD-10-CM

## 2019-12-05 DIAGNOSIS — N20.0 NEPHROLITHIASIS: ICD-10-CM

## 2019-12-05 DIAGNOSIS — G43.909 MIGRAINE WITHOUT STATUS MIGRAINOSUS, NOT INTRACTABLE, UNSPECIFIED MIGRAINE TYPE: ICD-10-CM

## 2019-12-05 DIAGNOSIS — F41.9 ANXIETY AND DEPRESSION: ICD-10-CM

## 2019-12-05 DIAGNOSIS — F17.219 CIGARETTE NICOTINE DEPENDENCE WITH NICOTINE-INDUCED DISORDER: ICD-10-CM

## 2019-12-05 PROCEDURE — 99244 OFF/OP CNSLTJ NEW/EST MOD 40: CPT | Performed by: NURSE PRACTITIONER

## 2019-12-05 PROCEDURE — 99406 BEHAV CHNG SMOKING 3-10 MIN: CPT | Performed by: NURSE PRACTITIONER

## 2019-12-05 PROCEDURE — G8427 DOCREV CUR MEDS BY ELIG CLIN: HCPCS | Performed by: NURSE PRACTITIONER

## 2019-12-05 PROCEDURE — G8417 CALC BMI ABV UP PARAM F/U: HCPCS | Performed by: NURSE PRACTITIONER

## 2019-12-05 PROCEDURE — G8484 FLU IMMUNIZE NO ADMIN: HCPCS | Performed by: NURSE PRACTITIONER

## 2019-12-05 RX ORDER — TAMSULOSIN HYDROCHLORIDE 0.4 MG/1
0.4 CAPSULE ORAL DAILY
COMMUNITY
End: 2019-12-13

## 2019-12-05 RX ORDER — PROMETHAZINE HYDROCHLORIDE 25 MG/1
25 TABLET ORAL
COMMUNITY
Start: 2019-11-23 | End: 2019-12-13

## 2019-12-05 RX ORDER — OXYCODONE HYDROCHLORIDE AND ACETAMINOPHEN 5; 325 MG/1; MG/1
TABLET ORAL
Refills: 0 | COMMUNITY
Start: 2019-11-23 | End: 2019-12-13

## 2019-12-05 ASSESSMENT — ENCOUNTER SYMPTOMS
VOMITING: 0
CONSTIPATION: 0
ABDOMINAL PAIN: 0
SINUS PRESSURE: 0
CHEST TIGHTNESS: 0
NAUSEA: 0
PHOTOPHOBIA: 0
EYE PAIN: 0
BLOOD IN STOOL: 0
ANAL BLEEDING: 0
BACK PAIN: 0
WHEEZING: 0
SORE THROAT: 0
ABDOMINAL DISTENTION: 1
SINUS PAIN: 0
RHINORRHEA: 0
COLOR CHANGE: 0
RECTAL PAIN: 0
SHORTNESS OF BREATH: 0
DIARRHEA: 0
COUGH: 0
TROUBLE SWALLOWING: 0

## 2019-12-07 PROBLEM — N39.0 UTI (URINARY TRACT INFECTION): Status: RESOLVED | Noted: 2019-11-07 | Resolved: 2019-12-07

## 2019-12-13 ENCOUNTER — TELEPHONE (OUTPATIENT)
Dept: INTERNAL MEDICINE CLINIC | Age: 39
End: 2019-12-13

## 2019-12-13 ENCOUNTER — HOSPITAL ENCOUNTER (EMERGENCY)
Age: 39
Discharge: HOME | End: 2019-12-13
Attending: EMERGENCY MEDICINE
Payer: COMMERCIAL

## 2019-12-13 VITALS
SYSTOLIC BLOOD PRESSURE: 104 MMHG | TEMPERATURE: 98.7 F | RESPIRATION RATE: 16 BRPM | DIASTOLIC BLOOD PRESSURE: 93 MMHG | HEART RATE: 89 BPM | WEIGHT: 162.7 LBS | OXYGEN SATURATION: 99 % | BODY MASS INDEX: 32.8 KG/M2 | HEIGHT: 59 IN

## 2019-12-13 DIAGNOSIS — R10.9 RIGHT FLANK PAIN: Primary | ICD-10-CM

## 2019-12-13 DIAGNOSIS — R11.0 NAUSEA: ICD-10-CM

## 2019-12-13 LAB
BILIRUBIN URINE: NEGATIVE
BLOOD, URINE: NEGATIVE
CLARITY: CLEAR
COLOR: YELLOW
GLUCOSE URINE: NEGATIVE MG/DL
KETONES, URINE: NEGATIVE MG/DL
LEUKOCYTE ESTERASE, URINE: NEGATIVE
MICROSCOPIC EXAMINATION: NORMAL
NITRITE, URINE: NEGATIVE
PH UA: 6 (ref 5–8)
PROTEIN UA: NEGATIVE MG/DL
SPECIFIC GRAVITY UA: <=1.005 (ref 1–1.03)
URINE REFLEX TO CULTURE: NORMAL
URINE TYPE: NORMAL
UROBILINOGEN, URINE: 0.2 E.U./DL

## 2019-12-13 PROCEDURE — 6370000000 HC RX 637 (ALT 250 FOR IP): Performed by: EMERGENCY MEDICINE

## 2019-12-13 PROCEDURE — 81003 URINALYSIS AUTO W/O SCOPE: CPT

## 2019-12-13 PROCEDURE — 99283 EMERGENCY DEPT VISIT LOW MDM: CPT

## 2019-12-13 PROCEDURE — 2580000003 HC RX 258: Performed by: EMERGENCY MEDICINE

## 2019-12-13 PROCEDURE — 6360000002 HC RX W HCPCS: Performed by: EMERGENCY MEDICINE

## 2019-12-13 RX ORDER — 0.9 % SODIUM CHLORIDE 0.9 %
1000 INTRAVENOUS SOLUTION INTRAVENOUS ONCE
Status: DISCONTINUED | OUTPATIENT
Start: 2019-12-13 | End: 2019-12-14 | Stop reason: HOSPADM

## 2019-12-13 RX ORDER — OXYCODONE HYDROCHLORIDE AND ACETAMINOPHEN 5; 325 MG/1; MG/1
1 TABLET ORAL ONCE
Status: COMPLETED | OUTPATIENT
Start: 2019-12-13 | End: 2019-12-13

## 2019-12-13 RX ORDER — OXYCODONE HYDROCHLORIDE AND ACETAMINOPHEN 5; 325 MG/1; MG/1
1-2 TABLET ORAL
COMMUNITY
End: 2020-01-13 | Stop reason: ALTCHOICE

## 2019-12-13 RX ORDER — ONDANSETRON 2 MG/ML
4 INJECTION INTRAMUSCULAR; INTRAVENOUS ONCE
Status: DISCONTINUED | OUTPATIENT
Start: 2019-12-13 | End: 2019-12-14 | Stop reason: HOSPADM

## 2019-12-13 RX ORDER — HYDROCODONE BITARTRATE AND ACETAMINOPHEN 5; 325 MG/1; MG/1
1 TABLET ORAL ONCE
Status: DISCONTINUED | OUTPATIENT
Start: 2019-12-13 | End: 2019-12-13

## 2019-12-13 RX ORDER — DOCUSATE SODIUM 100 MG/1
100 CAPSULE, LIQUID FILLED ORAL
COMMUNITY
Start: 2019-12-08 | End: 2020-01-13 | Stop reason: ALTCHOICE

## 2019-12-13 RX ORDER — ONDANSETRON 4 MG/1
4 TABLET, ORALLY DISINTEGRATING ORAL EVERY 8 HOURS PRN
Qty: 20 TABLET | Refills: 0 | Status: SHIPPED | OUTPATIENT
Start: 2019-12-13 | End: 2020-01-13 | Stop reason: ALTCHOICE

## 2019-12-13 RX ADMIN — OXYCODONE HYDROCHLORIDE AND ACETAMINOPHEN 1 TABLET: 5; 325 TABLET ORAL at 20:45

## 2019-12-13 ASSESSMENT — PAIN DESCRIPTION - ONSET: ONSET: GRADUAL

## 2019-12-13 ASSESSMENT — PAIN - FUNCTIONAL ASSESSMENT: PAIN_FUNCTIONAL_ASSESSMENT: ACTIVITIES ARE NOT PREVENTED

## 2019-12-13 ASSESSMENT — PAIN DESCRIPTION - DESCRIPTORS: DESCRIPTORS: THROBBING

## 2019-12-13 ASSESSMENT — PAIN DESCRIPTION - PAIN TYPE: TYPE: SURGICAL PAIN;VISCERAL PAIN

## 2019-12-13 ASSESSMENT — PAIN DESCRIPTION - PROGRESSION: CLINICAL_PROGRESSION: RESOLVED

## 2019-12-13 ASSESSMENT — PAIN SCALES - GENERAL
PAINLEVEL_OUTOF10: 8
PAINLEVEL_OUTOF10: 9

## 2019-12-13 ASSESSMENT — PAIN DESCRIPTION - LOCATION: LOCATION: ABDOMEN

## 2019-12-13 ASSESSMENT — PAIN DESCRIPTION - ORIENTATION: ORIENTATION: RIGHT

## 2019-12-13 NOTE — TELEPHONE ENCOUNTER
Patient says she had an Urine Culture at the [facility] ER last week and it came back that she had E Coli in her urine. Patient says she is allergic to a lot of antibiotics. Patient is currently in a lot of pain. Would like to know what she should do.  Please advise

## 2019-12-14 NOTE — TELEPHONE ENCOUNTER
Patient should go to the ER if she is having pain or a fever. This was discussed with patient during out last visit . Please call and advise.

## 2020-01-13 ENCOUNTER — HOSPITAL ENCOUNTER (EMERGENCY)
Age: 40
Discharge: HOME | End: 2020-01-13
Payer: COMMERCIAL

## 2020-01-13 ENCOUNTER — APPOINTMENT (OUTPATIENT)
Dept: CT IMAGING | Age: 40
End: 2020-01-13
Payer: COMMERCIAL

## 2020-01-13 LAB
A/G RATIO: 1.1 (ref 1.1–2.2)
ALBUMIN SERPL-MCNC: 4.1 G/DL (ref 3.4–5)
ALP BLD-CCNC: 92 U/L (ref 40–129)
ALT SERPL-CCNC: 17 U/L (ref 10–40)
ANION GAP SERPL CALCULATED.3IONS-SCNC: 12 MMOL/L (ref 3–16)
AST SERPL-CCNC: 34 U/L (ref 15–37)
BASOPHILS ABSOLUTE: 0.1 K/UL (ref 0–0.2)
BASOPHILS RELATIVE PERCENT: 0.8 %
BILIRUB SERPL-MCNC: 0.4 MG/DL (ref 0–1)
BILIRUBIN URINE: NEGATIVE
BLOOD, URINE: NEGATIVE
BUN BLDV-MCNC: 12 MG/DL (ref 7–20)
CALCIUM SERPL-MCNC: 10 MG/DL (ref 8.3–10.6)
CHLORIDE BLD-SCNC: 106 MMOL/L (ref 99–110)
CLARITY: CLEAR
CO2: 22 MMOL/L (ref 21–32)
COLOR: YELLOW
CREAT SERPL-MCNC: 0.7 MG/DL (ref 0.6–1.1)
EOSINOPHILS ABSOLUTE: 0.1 K/UL (ref 0–0.6)
EOSINOPHILS RELATIVE PERCENT: 1.1 %
GFR AFRICAN AMERICAN: >60
GFR NON-AFRICAN AMERICAN: >60
GLOBULIN: 3.9 G/DL
GLUCOSE BLD-MCNC: 120 MG/DL (ref 70–99)
GLUCOSE URINE: NEGATIVE MG/DL
HCG(URINE) PREGNANCY TEST: NEGATIVE
HCT VFR BLD CALC: 45 % (ref 36–48)
HEMOGLOBIN: 15.3 G/DL (ref 12–16)
KETONES, URINE: NEGATIVE MG/DL
LEUKOCYTE ESTERASE, URINE: NEGATIVE
LIPASE: 19 U/L (ref 13–60)
LYMPHOCYTES ABSOLUTE: 3.2 K/UL (ref 1–5.1)
LYMPHOCYTES RELATIVE PERCENT: 36.1 %
MCH RBC QN AUTO: 30.5 PG (ref 26–34)
MCHC RBC AUTO-ENTMCNC: 34 G/DL (ref 31–36)
MCV RBC AUTO: 89.7 FL (ref 80–100)
MICROSCOPIC EXAMINATION: NORMAL
MONOCYTES ABSOLUTE: 0.5 K/UL (ref 0–1.3)
MONOCYTES RELATIVE PERCENT: 5.5 %
NEUTROPHILS ABSOLUTE: 5 K/UL (ref 1.7–7.7)
NEUTROPHILS RELATIVE PERCENT: 56.5 %
NITRITE, URINE: NEGATIVE
PDW BLD-RTO: 13.4 % (ref 12.4–15.4)
PH UA: 6 (ref 5–8)
PLATELET # BLD: 290 K/UL (ref 135–450)
PMV BLD AUTO: 7.7 FL (ref 5–10.5)
POTASSIUM SERPL-SCNC: 4.9 MMOL/L (ref 3.5–5.1)
PROTEIN UA: NEGATIVE MG/DL
RBC # BLD: 5.02 M/UL (ref 4–5.2)
SODIUM BLD-SCNC: 140 MMOL/L (ref 136–145)
SPECIFIC GRAVITY UA: >=1.03 (ref 1–1.03)
TOTAL PROTEIN: 8 G/DL (ref 6.4–8.2)
URINE REFLEX TO CULTURE: NORMAL
URINE TYPE: NORMAL
UROBILINOGEN, URINE: 0.2 E.U./DL
WBC # BLD: 8.9 K/UL (ref 4–11)

## 2020-01-13 PROCEDURE — 96375 TX/PRO/DX INJ NEW DRUG ADDON: CPT

## 2020-01-13 PROCEDURE — 81003 URINALYSIS AUTO W/O SCOPE: CPT

## 2020-01-13 PROCEDURE — 99284 EMERGENCY DEPT VISIT MOD MDM: CPT

## 2020-01-13 PROCEDURE — 36415 COLL VENOUS BLD VENIPUNCTURE: CPT

## 2020-01-13 PROCEDURE — 83690 ASSAY OF LIPASE: CPT

## 2020-01-13 PROCEDURE — 85025 COMPLETE CBC W/AUTO DIFF WBC: CPT

## 2020-01-13 PROCEDURE — 96376 TX/PRO/DX INJ SAME DRUG ADON: CPT

## 2020-01-13 PROCEDURE — 80053 COMPREHEN METABOLIC PANEL: CPT

## 2020-01-13 PROCEDURE — 2580000003 HC RX 258: Performed by: NURSE PRACTITIONER

## 2020-01-13 PROCEDURE — 84703 CHORIONIC GONADOTROPIN ASSAY: CPT

## 2020-01-13 PROCEDURE — 6360000002 HC RX W HCPCS: Performed by: NURSE PRACTITIONER

## 2020-01-13 PROCEDURE — 6370000000 HC RX 637 (ALT 250 FOR IP): Performed by: NURSE PRACTITIONER

## 2020-01-13 PROCEDURE — 74176 CT ABD & PELVIS W/O CONTRAST: CPT

## 2020-01-13 PROCEDURE — 96374 THER/PROPH/DIAG INJ IV PUSH: CPT

## 2020-01-13 RX ORDER — 0.9 % SODIUM CHLORIDE 0.9 %
1000 INTRAVENOUS SOLUTION INTRAVENOUS ONCE
Status: DISCONTINUED | OUTPATIENT
Start: 2020-01-13 | End: 2020-01-13

## 2020-01-13 RX ORDER — OXYCODONE HYDROCHLORIDE 5 MG/1
5 TABLET ORAL EVERY 6 HOURS PRN
COMMUNITY
End: 2020-02-18

## 2020-01-13 RX ORDER — TAMSULOSIN HYDROCHLORIDE 0.4 MG/1
0.4 CAPSULE ORAL ONCE
Status: COMPLETED | OUTPATIENT
Start: 2020-01-13 | End: 2020-01-13

## 2020-01-13 RX ORDER — TAMSULOSIN HYDROCHLORIDE 0.4 MG/1
0.4 CAPSULE ORAL DAILY
Qty: 5 CAPSULE | Refills: 0 | Status: SHIPPED | OUTPATIENT
Start: 2020-01-13 | End: 2020-01-21

## 2020-01-13 RX ORDER — 0.9 % SODIUM CHLORIDE 0.9 %
1000 INTRAVENOUS SOLUTION INTRAVENOUS ONCE
Status: COMPLETED | OUTPATIENT
Start: 2020-01-13 | End: 2020-01-13

## 2020-01-13 RX ORDER — ONDANSETRON 2 MG/ML
4 INJECTION INTRAMUSCULAR; INTRAVENOUS ONCE
Status: COMPLETED | OUTPATIENT
Start: 2020-01-13 | End: 2020-01-13

## 2020-01-13 RX ADMIN — SODIUM CHLORIDE 1000 ML: 9 INJECTION, SOLUTION INTRAVENOUS at 19:40

## 2020-01-13 RX ADMIN — HYDROMORPHONE HYDROCHLORIDE 1 MG: 1 INJECTION, SOLUTION INTRAMUSCULAR; INTRAVENOUS; SUBCUTANEOUS at 20:49

## 2020-01-13 RX ADMIN — TAMSULOSIN HYDROCHLORIDE 0.4 MG: 0.4 CAPSULE ORAL at 21:38

## 2020-01-13 RX ADMIN — HYDROMORPHONE HYDROCHLORIDE 1 MG: 1 INJECTION, SOLUTION INTRAMUSCULAR; INTRAVENOUS; SUBCUTANEOUS at 19:48

## 2020-01-13 RX ADMIN — ONDANSETRON 4 MG: 2 INJECTION INTRAMUSCULAR; INTRAVENOUS at 19:43

## 2020-01-13 ASSESSMENT — PAIN DESCRIPTION - ORIENTATION: ORIENTATION: RIGHT

## 2020-01-13 ASSESSMENT — PAIN DESCRIPTION - PAIN TYPE: TYPE: ACUTE PAIN

## 2020-01-13 ASSESSMENT — PAIN SCALES - GENERAL
PAINLEVEL_OUTOF10: 6
PAINLEVEL_OUTOF10: 6
PAINLEVEL_OUTOF10: 10
PAINLEVEL_OUTOF10: 10
PAINLEVEL_OUTOF10: 6

## 2020-01-13 ASSESSMENT — PAIN DESCRIPTION - LOCATION: LOCATION: FLANK

## 2020-01-13 ASSESSMENT — PAIN DESCRIPTION - DESCRIPTORS: DESCRIPTORS: SHARP

## 2020-01-13 NOTE — ED NOTES
right flank pain became severe at 0300 today.   had some right flank achiness for few days. started passing kidney stones at 0300. took picture on her phone of approx 6-8 small stones that pt states she passed at home today. long term hx of kidney stones. can't keep down her oxycodone pills due to N/V.   vomited 7 times today. Encouraged to give urine specimen again. Pt states voided at home about 1500 and hasn't been able to drink anything.       Coleen Morse RN  01/13/20 2406

## 2020-01-13 NOTE — ED PROVIDER NOTES
general anesthesia, she gets very nauseated and wakes up with a migrain     Depression with anxiety     Difficult intubation     ESBL (extended spectrum beta-lactamase) producing bacteria infection 2019    urine    Functional ovarian cysts 2008    rt ovary cyst x 2 yrs.  Headache(784.0)     migraines    Hiatal hernia     History of blood transfusion     History of kidney stones     History of PCOS     Hydrocephalus (HCC)     Irritable bowel syndrome 2004    had colonoscopy about 6 yrs ago    Kidney stone     Meningitis     Neutrophilic leukocytosis     Nicotine dependence     Primary osteoarthritis of left knee 2016    S/P cone biopsy of cervix     Scoliosis .s     (ventriculoperitoneal) shunt status     hydrocephalus f/w Neurosurgeon at [place] [id number] Wears glasses     reading     Past Surgical History:   Procedure Laterality Date    APPENDECTOMY      appendicitis     SECTION      placenta previa    CHOLECYSTECTOMY      COLONOSCOPY  2004    COLPOSCOPY      CSF SHUNT      replaced at age 17   [facility] CYSTOSCOPY      stone removal   [facility]    inguinal    HYSTERECTOMY, TOTAL ABDOMINAL  2016    TAHBSO, adhesions/PCOS    KNEE ARTHROSCOPY Left 2015    medial meniscectomy, chondroplasty, plica resection    LITHOTRIPSY Bilateral 12/10/2019    OTHER SURGICAL HISTORY  10/19/2016    op lap    VENTRICULOPERITONEAL SHUNT      multiple revisions, most recent        CURRENT MEDICATIONS  (may include discharge medications prescribed in the ED)  Current Outpatient Rx   Medication Sig Dispense Refill    oxyCODONE (ROXICODONE) 5 MG immediate release tablet Take 5 mg by mouth every 6 hours as needed for Pain.       tamsulosin (FLOMAX) 0.4 MG capsule Take 1 capsule by mouth daily for 5 doses 5 capsule 0       ALLERGIES    Allergies   Allergen Reactions    Bentyl [Dicyclomine Hcl] Shortness Of Breath and Anxiety    Mushroom Extract Complex Anaphylaxis     Can't eat mushrooms.      Sulfa Antibiotics Shortness Of Breath    Vancomycin Anaphylaxis and Hives    Adhesive Tape     Bee Venom Swelling    Hydrocodone Hives    Levaquin [Levofloxacin]     Toradol [Ketorolac Tromethamine] Hives and Itching    Ceftaroline Rash    Daptomycin Swelling and Rash    Dicyclomine Anxiety    Fioricet-Codeine [Butalbital-Apap-Caff-Cod] Anxiety    Prochlorperazine Anxiety    Zosyn [Piperacillin Sod-Tazobactam So] Hives     Also causes nausea, SOB, dizziness       SOCIAL HISTORY    Social History     Socioeconomic History    Marital status: Single     Spouse name: None    Number of children: None    Years of education: None    Highest education level: None   Occupational History    Occupation: disability, SSI    Social Needs    Financial resource strain: None    Food insecurity:     Worry: None     Inability: None    Transportation needs:     Medical: None     Non-medical: None   Tobacco Use    Smoking status: Current Every Day Smoker     Packs/day: 0.50     Years: 18.00     Pack years: 9.00     Types: Cigarettes    Smokeless tobacco: Never Used   Substance and Sexual Activity    Alcohol use: No     Alcohol/week: 0.0 standard drinks    Drug use: No    Sexual activity: Not Currently     Partners: Male   Lifestyle    Physical activity:     Days per week: None     Minutes per session: None    Stress: None   Relationships    Social connections:     Talks on phone: None     Gets together: None     Attends Sikhism service: None     Active member of club or organization: None     Attends meetings of clubs or organizations: None     Relationship status: None    Intimate partner violence:     Fear of current or ex partner: None     Emotionally abused: None     Physically abused: None     Forced sexual activity: None   Other Topics Concern    None   Social History Narrative    None       PHYSICAL EXAM    VITAL SIGNS: BP (!) 147/85   Pulse 84 to drink plenty of fluids. Stated that she was feeling better. She does have a codon at home as she is in pain management. She did ask for a third dose of Dilaudid right before discharge which I told her we were unable to do at this time. The patient verbalized understanding of this. She was pleasant about it. I instructed her to return to the emergency department for worsening symptoms or for vomiting. She was given a p.o. challenge in the emergency department and passed it. I instructed her to follow-up with her [facility] neurologist as needed. Patient verbalized understanding of the discharge instructions. Controlled Substance Monitoring:    Acute and Chronic Pain Monitoring:   RX Monitoring 1/13/2020   Attestation -   Periodic Controlled Substance Monitoring Possible medication side effects, risk of tolerance/dependence & alternative treatments discussed. FINAL IMPRESSION    1.  Nephrolithiasis        PLAN  Discharge with outpatient followup, instructions and medication (see EMR)     (Please note that this note was completed with a voice recognition program.  Every attempt was made to edit the dictations, but inevitably there remain words that are mis-transcribed.)                 FREDRICK Alcaraz - CNP  01/13/20 0948

## 2020-01-14 VITALS
OXYGEN SATURATION: 99 % | RESPIRATION RATE: 20 BRPM | DIASTOLIC BLOOD PRESSURE: 78 MMHG | TEMPERATURE: 99.1 F | HEIGHT: 59 IN | SYSTOLIC BLOOD PRESSURE: 144 MMHG | BODY MASS INDEX: 31.73 KG/M2 | WEIGHT: 157.41 LBS | HEART RATE: 88 BPM

## 2020-01-14 NOTE — ED NOTES
[id number] trying to start IV.   3 attempts needed. Finally able to insert 22g angio to left hand. brigette well. Still reports pain right flank radiating to right side and RLQ at 10.    No active vomiting     Priya Deras RN  01/13/20 2008

## 2020-01-14 NOTE — ED NOTES
Asking for more pain medicine. NP updated. NP states will order more pain medicine. NP wants st cath specimen if pt unable to void--pt updated. Drinking ice water with NP's permission. NP states lactic acid not needed.      Myah Cedeno, SHANA  01/13/20 5721       Myah Cedeno, SHANA  01/13/20 2536

## 2020-01-17 ENCOUNTER — HOSPITAL ENCOUNTER (EMERGENCY)
Age: 40
Discharge: HOME | End: 2020-01-17
Attending: EMERGENCY MEDICINE
Payer: COMMERCIAL

## 2020-01-17 VITALS
HEART RATE: 90 BPM | BODY MASS INDEX: 32.18 KG/M2 | DIASTOLIC BLOOD PRESSURE: 64 MMHG | WEIGHT: 159.61 LBS | RESPIRATION RATE: 18 BRPM | SYSTOLIC BLOOD PRESSURE: 107 MMHG | TEMPERATURE: 98.8 F | OXYGEN SATURATION: 99 % | HEIGHT: 59 IN

## 2020-01-17 LAB
A/G RATIO: 1.4 (ref 1.1–2.2)
ALBUMIN SERPL-MCNC: 4.8 G/DL (ref 3.4–5)
ALP BLD-CCNC: 107 U/L (ref 40–129)
ALT SERPL-CCNC: 19 U/L (ref 10–40)
AMPHETAMINE SCREEN, URINE: NORMAL
ANION GAP SERPL CALCULATED.3IONS-SCNC: 16 MMOL/L (ref 3–16)
AST SERPL-CCNC: 12 U/L (ref 15–37)
BARBITURATE SCREEN URINE: NORMAL
BASOPHILS ABSOLUTE: 0 K/UL (ref 0–0.2)
BASOPHILS RELATIVE PERCENT: 0.5 %
BENZODIAZEPINE SCREEN, URINE: NORMAL
BILIRUB SERPL-MCNC: 0.3 MG/DL (ref 0–1)
BILIRUBIN URINE: NEGATIVE
BLOOD, URINE: NEGATIVE
BUN BLDV-MCNC: 12 MG/DL (ref 7–20)
CALCIUM SERPL-MCNC: 10.6 MG/DL (ref 8.3–10.6)
CANNABINOID SCREEN URINE: NORMAL
CHLORIDE BLD-SCNC: 103 MMOL/L (ref 99–110)
CLARITY: CLEAR
CO2: 19 MMOL/L (ref 21–32)
COCAINE METABOLITE SCREEN URINE: NORMAL
COLOR: YELLOW
CREAT SERPL-MCNC: 0.6 MG/DL (ref 0.6–1.1)
EOSINOPHILS ABSOLUTE: 0 K/UL (ref 0–0.6)
EOSINOPHILS RELATIVE PERCENT: 0 %
GFR AFRICAN AMERICAN: >60
GFR NON-AFRICAN AMERICAN: >60
GLOBULIN: 3.5 G/DL
GLUCOSE BLD-MCNC: 224 MG/DL (ref 70–99)
GLUCOSE URINE: 100 MG/DL
HCG(URINE) PREGNANCY TEST: NEGATIVE
HCT VFR BLD CALC: 46.4 % (ref 36–48)
HEMOGLOBIN: 16.3 G/DL (ref 12–16)
KETONES, URINE: NEGATIVE MG/DL
LEUKOCYTE ESTERASE, URINE: NEGATIVE
LYMPHOCYTES ABSOLUTE: 1.9 K/UL (ref 1–5.1)
LYMPHOCYTES RELATIVE PERCENT: 18.7 %
Lab: NORMAL
MCH RBC QN AUTO: 31 PG (ref 26–34)
MCHC RBC AUTO-ENTMCNC: 35.2 G/DL (ref 31–36)
MCV RBC AUTO: 88.3 FL (ref 80–100)
METHADONE SCREEN, URINE: NORMAL
MICROSCOPIC EXAMINATION: ABNORMAL
MONOCYTES ABSOLUTE: 0.1 K/UL (ref 0–1.3)
MONOCYTES RELATIVE PERCENT: 1.4 %
NEUTROPHILS ABSOLUTE: 8.1 K/UL (ref 1.7–7.7)
NEUTROPHILS RELATIVE PERCENT: 79.4 %
NITRITE, URINE: NEGATIVE
OPIATE SCREEN URINE: NORMAL
OXYCODONE URINE: NORMAL
PDW BLD-RTO: 13.5 % (ref 12.4–15.4)
PH UA: 7
PH UA: 7 (ref 5–8)
PHENCYCLIDINE SCREEN URINE: NORMAL
PLATELET # BLD: 292 K/UL (ref 135–450)
PMV BLD AUTO: 7.8 FL (ref 5–10.5)
POTASSIUM SERPL-SCNC: 3.8 MMOL/L (ref 3.5–5.1)
PROPOXYPHENE SCREEN: NORMAL
PROTEIN UA: NEGATIVE MG/DL
RBC # BLD: 5.25 M/UL (ref 4–5.2)
SODIUM BLD-SCNC: 138 MMOL/L (ref 136–145)
SPECIFIC GRAVITY UA: 1.01 (ref 1–1.03)
TOTAL PROTEIN: 8.3 G/DL (ref 6.4–8.2)
URINE REFLEX TO CULTURE: ABNORMAL
URINE TYPE: ABNORMAL
UROBILINOGEN, URINE: 0.2 E.U./DL
WBC # BLD: 10.3 K/UL (ref 4–11)

## 2020-01-17 PROCEDURE — 2500000003 HC RX 250 WO HCPCS: Performed by: EMERGENCY MEDICINE

## 2020-01-17 PROCEDURE — 96374 THER/PROPH/DIAG INJ IV PUSH: CPT

## 2020-01-17 PROCEDURE — 80053 COMPREHEN METABOLIC PANEL: CPT

## 2020-01-17 PROCEDURE — 96375 TX/PRO/DX INJ NEW DRUG ADDON: CPT

## 2020-01-17 PROCEDURE — 85025 COMPLETE CBC W/AUTO DIFF WBC: CPT

## 2020-01-17 PROCEDURE — 84703 CHORIONIC GONADOTROPIN ASSAY: CPT

## 2020-01-17 PROCEDURE — 2580000003 HC RX 258: Performed by: NURSE PRACTITIONER

## 2020-01-17 PROCEDURE — 99284 EMERGENCY DEPT VISIT MOD MDM: CPT

## 2020-01-17 PROCEDURE — 81003 URINALYSIS AUTO W/O SCOPE: CPT

## 2020-01-17 PROCEDURE — 80307 DRUG TEST PRSMV CHEM ANLYZR: CPT

## 2020-01-17 PROCEDURE — 96376 TX/PRO/DX INJ SAME DRUG ADON: CPT

## 2020-01-17 PROCEDURE — 6360000002 HC RX W HCPCS: Performed by: NURSE PRACTITIONER

## 2020-01-17 RX ORDER — 0.9 % SODIUM CHLORIDE 0.9 %
1000 INTRAVENOUS SOLUTION INTRAVENOUS ONCE
Status: COMPLETED | OUTPATIENT
Start: 2020-01-17 | End: 2020-01-17

## 2020-01-17 RX ORDER — ONDANSETRON 4 MG/1
4 TABLET, ORALLY DISINTEGRATING ORAL EVERY 8 HOURS PRN
Qty: 12 TABLET | Refills: 0 | Status: SHIPPED | OUTPATIENT
Start: 2020-01-17 | End: 2020-01-20

## 2020-01-17 RX ORDER — KETAMINE HCL IN NACL, ISO-OSM 100MG/10ML
20 SYRINGE (ML) INJECTION ONCE
Status: COMPLETED | OUTPATIENT
Start: 2020-01-17 | End: 2020-01-17

## 2020-01-17 RX ORDER — ONDANSETRON 2 MG/ML
4 INJECTION INTRAMUSCULAR; INTRAVENOUS ONCE
Status: COMPLETED | OUTPATIENT
Start: 2020-01-17 | End: 2020-01-17

## 2020-01-17 RX ADMIN — Medication 20 MG: at 07:41

## 2020-01-17 RX ADMIN — SODIUM CHLORIDE 1000 ML: 9 INJECTION, SOLUTION INTRAVENOUS at 06:55

## 2020-01-17 RX ADMIN — ONDANSETRON 4 MG: 2 INJECTION INTRAMUSCULAR; INTRAVENOUS at 06:55

## 2020-01-17 RX ADMIN — ONDANSETRON 4 MG: 2 INJECTION INTRAMUSCULAR; INTRAVENOUS at 08:04

## 2020-01-17 ASSESSMENT — PAIN DESCRIPTION - PROGRESSION
CLINICAL_PROGRESSION: RESOLVED
CLINICAL_PROGRESSION: NOT CHANGED

## 2020-01-17 ASSESSMENT — PAIN DESCRIPTION - FREQUENCY: FREQUENCY: CONTINUOUS

## 2020-01-17 ASSESSMENT — PAIN SCALES - GENERAL
PAINLEVEL_OUTOF10: 10
PAINLEVEL_OUTOF10: 10
PAINLEVEL_OUTOF10: 0

## 2020-01-17 ASSESSMENT — PAIN DESCRIPTION - ORIENTATION: ORIENTATION: RIGHT

## 2020-01-17 ASSESSMENT — PAIN DESCRIPTION - PAIN TYPE: TYPE: ACUTE PAIN

## 2020-01-17 ASSESSMENT — PAIN DESCRIPTION - LOCATION: LOCATION: FLANK;GROIN

## 2020-01-17 ASSESSMENT — PAIN DESCRIPTION - DESCRIPTORS: DESCRIPTORS: SHARP

## 2020-01-17 NOTE — ED NOTES
Pt given 10 MG of the 20 MG that were ordered of Ketamine slow and diluted in NS. Patient became very anxious and said \"my face is numb, I don't like this\". Vitals signs are stable. This nurse tried to calm patient down. Millie Weaver notified and assessed patient at bedside.       Ehsan Nichols RN  01/17/20 0800

## 2020-01-17 NOTE — ED PROVIDER NOTES
[address]  [address] [address]  Dept: [phone]  Loc: [address] [place] [address] COMPLAINT    Chief Complaint   Patient presents with    Flank Pain     hx of kidney stones, pain on right side    Emesis     for 3 days       HPI    Bharati Nicole is a 42 y.o. female who presents to the emergency department via EMS with complaints of right flank pain and vomiting. The patient was actually seen by myself 3 days ago at [address] [facility] for the same symptoms. She does have a history of kidney stones and she is in pain management for a  shunt. She has been taking oxycodone and she states it has not been helping and she has been throwing it up. She denies body aches, fevers, chills, abdominal pain, diarrhea. REVIEW OF SYSTEMS    : See HPI, no hematuria  GI: No vomiting or diarrhea  General: No measured fevers    PAST MEDICAL & SURGICAL HISTORY    Past Medical History:   Diagnosis Date    ADHD (attention deficit hyperactivity disorder) 82    Anesthesia complication     pt states with general anesthesia, she gets very nauseated and wakes up with a migrain     Depression with anxiety     Difficult intubation     ESBL (extended spectrum beta-lactamase) producing bacteria infection 11/06/2019    urine    Functional ovarian cysts 2008    rt ovary cyst x 2 yrs.     Headache(784.0)     migraines    Hiatal hernia     History of blood transfusion     History of kidney stones     History of PCOS     Hydrocephalus (HCC)     Irritable bowel syndrome 2004    had colonoscopy about 6 yrs ago    Kidney stone     Meningitis 38/7601    Neutrophilic leukocytosis     Nicotine dependence     Primary osteoarthritis of left knee 7/1/2016    S/P cone biopsy of cervix 2004    Scoliosis 1990.s     (ventriculoperitoneal) shunt status 1982    hydrocephalus f/w Neurosurgeon at [facility] Wears glasses     reading     Past Surgical History:   Procedure Laterality Date    APPENDECTOMY  2003    appendicitis     SECTION      placenta previa    CHOLECYSTECTOMY      COLONOSCOPY  2004    COLPOSCOPY      CSF SHUNT      replaced at age 17    CYSTOSCOPY      stone removal   [address]    inguinal    HYSTERECTOMY, TOTAL ABDOMINAL  2016    TAHBSO, adhesions/PCOS    KNEE ARTHROSCOPY Left 2015    medial meniscectomy, chondroplasty, plica resection    LITHOTRIPSY Bilateral 12/10/2019    OTHER SURGICAL HISTORY  10/19/2016    op lap    VENTRICULOPERITONEAL SHUNT      multiple revisions, most recent        CURRENT MEDICATIONS  (may include discharge medications prescribed in the ED)  Current Outpatient Rx   Medication Sig Dispense Refill    ondansetron (ZOFRAN ODT) 4 MG disintegrating tablet Take 1 tablet by mouth every 8 hours as needed for Nausea 12 tablet 0    oxyCODONE (ROXICODONE) 5 MG immediate release tablet Take 5 mg by mouth every 6 hours as needed for Pain.  tamsulosin (FLOMAX) 0.4 MG capsule Take 1 capsule by mouth daily for 5 doses 5 capsule 0       ALLERGIES    Allergies   Allergen Reactions    Bentyl [Dicyclomine Hcl] Shortness Of Breath and Anxiety    Mushroom Extract Complex Anaphylaxis     Can't eat mushrooms.      Sulfa Antibiotics Shortness Of Breath    Vancomycin Anaphylaxis and Hives    Adhesive Tape     Bee Venom Swelling    Hydrocodone Hives    Levaquin [Levofloxacin]     Toradol [Ketorolac Tromethamine] Hives and Itching    Ceftaroline Rash    Daptomycin Swelling and Rash    Dicyclomine Anxiety    Fioricet-Codeine [Butalbital-Apap-Caff-Cod] Anxiety    Prochlorperazine Anxiety    Zosyn [Piperacillin Sod-Tazobactam So] Hives     Also causes nausea, SOB, dizziness       SOCIAL HISTORY    Social History     Socioeconomic History    Marital status: Single     Spouse name: Not on file    Number of children: Not on file    Years of education: Not on file    Highest education level: Not on file   Occupational History    Occupation: disability, SSI    Social Needs    Financial resource strain: Not on file    Food insecurity:     Worry: Not on file     Inability: Not on file    Transportation needs:     Medical: Not on file     Non-medical: Not on file   Tobacco Use    Smoking status: Current Every Day Smoker     Packs/day: 0.50     Years: 18.00     Pack years: 9.00     Types: Cigarettes    Smokeless tobacco: Never Used   Substance and Sexual Activity    Alcohol use: No     Alcohol/week: 0.0 standard drinks    Drug use: No    Sexual activity: Not Currently     Partners: Male   Lifestyle    Physical activity:     Days per week: Not on file     Minutes per session: Not on file    Stress: Not on file   Relationships    Social connections:     Talks on phone: Not on file     Gets together: Not on file     Attends Sikhism service: Not on file     Active member of club or organization: Not on file     Attends meetings of clubs or organizations: Not on file     Relationship status: Not on file    Intimate partner violence:     Fear of current or ex partner: Not on file     Emotionally abused: Not on file     Physically abused: Not on file     Forced sexual activity: Not on file   Other Topics Concern    Not on file   Social History Narrative    Not on file       PHYSICAL EXAM    VITAL SIGNS: /64   Pulse 90   Temp 98.8 °F (37.1 °C) (Oral)   Resp 18   Ht 4' 11\" (1.499 m)   Wt 159 lb 9.8 oz (72.4 kg)   LMP 10/31/2016 (Exact Date)   SpO2 99%   BMI 32.24 kg/m²    Constitutional:  Well developed, well nourished  HENT:  Atraumatic, moist mucus membranes  EYES: Conjunctiva Moist, Nonicteric  NECK: No JVD, supple   Respiratory:  No respiratory distress. Sounds clear throughout auscultation. Respirations are even and unlabored. No cough noted. On room air. No distress. Cardiovascular: no JVD.   Regular rhythm with a RADIOLOGY/PROCEDURES    No orders to display       ED COURSE & MEDICAL DECISION MAKING    Pertinent Labs studies interpreted and reviewed. (See chart for details)  See chart for details of medications given during the ED stay. Medications   0.9 % sodium chloride bolus (0 mLs Intravenous Stopped 1/17/20 0848)   ondansetron (ZOFRAN) injection 4 mg (4 mg Intravenous Given 1/17/20 0655)   ketamine (KETALAR) injection 20 mg (20 mg Intravenous Given 1/17/20 0741)   ondansetron (ZOFRAN) injection 4 mg (4 mg Intravenous Given 1/17/20 0804)     This patient was seen evaluated by myself my attending physician Dr. Dakota Galarza. Differential diagnosis includes but is not limited to urinary retention, urinary tract infection, pyelonephritis, urosepsis, other GI emergency, neuro emergency, opioid withdrawal, anxiety other. She is nontoxic in appearance. He is tachycardic with a heart rate of 121 on arrival.  He is here with 3 days of vomiting and right flank pain. I saw this patient at [address] [place] [address] on January 13 with the same complaints. She had a CAT scan of the abdomen that showed a 2 mm nonobstructing stone in the right kidney. We placed a saline lock in her on arrival.  She is requesting Dilaudid on arrival.    CBC she has no leukocytosis. Hemoglobin is 16.3 with a hematocrit of 46.4. Electrolytes on CMP are unremarkable. CO2 is a little low at 19. Glucose is 224. BUN and creatinine and GFR are all normal.  LFTs are unremarkable. She is not pregnant. Analysis reveals no infection, negative for blood, negative for ketones and a urine specific gravity of 1.007 urinalysis is negative. Patient states that she is in pain management and takes scheduled oxycodone. She states that she has been taking it and she has not been out of it. I talked to her about getting a renal ultrasound rather than another CAT scan today.   I am concerned that the patient has had multiple ED visits with these same complaints and the multiple CT scans. I instructed her that at some point she is going to get so much radiation that it can cause her to have cancer. She states \"I do not give a fuck. \"    She was given fluids, Zofran and Dr. Nicolás Bell order ketamine. She requested the nurse to talk to me specifically. When I went to her room she stated \"that doctor always gives me ketamine and it is stupid bullshit medicine. \"  She continued to request IV Dilaudid which I instructed her she would not be receiving today. Patient's urine drug screen is negative. Unsure why this is is the patient is adamant she is taking her pain medications. I feel that this patient does not need another CT scan based on the fact that she continues to have negative blood in her urine and negative ketones with an unremarkable urine specific gravity. She has not vomited and she passed an oral challenge. The patient continued to ask for IV Dilaudid. I instructed her that we could not find a medical reason to give her a such strong opioid medication. The patient repeatedly stated \"I am not a drug seeker. \"  She then asked to at least get IV Phenergan. Again I had a another conversation regarding this with her. She has not vomited. And I informed her I was not going to give her Phenergan through her IV. She was not happy with the care that she received in the emergency department today. The patient is going to be discharged with instructions to follow-up with her PCP and pain management specialist and her urologist that she had been given a referral to. She was instructed to return to the emergency department for worsening symptoms. The patient verbalized understanding of the discharge instructions. FINAL IMPRESSION    1. Non-intractable vomiting with nausea, unspecified vomiting type    2. Right flank pain    3.  Drug-seeking behavior        PLAN  Discharge with outpatient followup, instructions and medication (see EMR)     (Please note that this note was completed with a voice recognition program.  Every attempt was made to edit the dictations, but inevitably there remain words that are mis-transcribed.)                 FREDRICK Uriostegui - CNP  01/20/20 0562

## 2020-01-21 ENCOUNTER — OFFICE VISIT (OUTPATIENT)
Dept: INTERNAL MEDICINE CLINIC | Age: 40
End: 2020-01-21
Payer: COMMERCIAL

## 2020-01-21 VITALS
RESPIRATION RATE: 16 BRPM | BODY MASS INDEX: 32.32 KG/M2 | HEART RATE: 87 BPM | SYSTOLIC BLOOD PRESSURE: 128 MMHG | WEIGHT: 160 LBS | DIASTOLIC BLOOD PRESSURE: 87 MMHG | OXYGEN SATURATION: 98 %

## 2020-01-21 PROCEDURE — 4004F PT TOBACCO SCREEN RCVD TLK: CPT | Performed by: NURSE PRACTITIONER

## 2020-01-21 PROCEDURE — 99406 BEHAV CHNG SMOKING 3-10 MIN: CPT | Performed by: NURSE PRACTITIONER

## 2020-01-21 PROCEDURE — G8427 DOCREV CUR MEDS BY ELIG CLIN: HCPCS | Performed by: NURSE PRACTITIONER

## 2020-01-21 PROCEDURE — G8417 CALC BMI ABV UP PARAM F/U: HCPCS | Performed by: NURSE PRACTITIONER

## 2020-01-21 PROCEDURE — 99213 OFFICE O/P EST LOW 20 MIN: CPT | Performed by: NURSE PRACTITIONER

## 2020-01-21 PROCEDURE — G8484 FLU IMMUNIZE NO ADMIN: HCPCS | Performed by: NURSE PRACTITIONER

## 2020-01-21 RX ORDER — TERBINAFINE HYDROCHLORIDE 250 MG/1
250 TABLET ORAL DAILY
Qty: 42 TABLET | Refills: 0 | Status: SHIPPED | OUTPATIENT
Start: 2020-01-21 | End: 2020-03-03

## 2020-01-21 NOTE — PROGRESS NOTES
Pt is here for: Toe nail fungus     Chief Complaint   Patient presents with    Nail Problem           HPI  Toenail fungus  This problem is chronic and recurring. Patient states that she has had similar issues in the past in which she took Lamisil and s/s \"completely improved\". Denies pain, discharge or loss of toenail. Reports intermittent pruritis. /87 (Site: Left Upper Arm, Position: Sitting, Cuff Size: Large Adult)   Pulse 87   Resp 16   Wt 160 lb (72.6 kg)   LMP 10/31/2016 (Exact Date)   SpO2 98%   Breastfeeding No   BMI 32.32 kg/m²     Review of Systems   Constitutional: Negative for activity change, appetite change, chills and unexpected weight change. Cardiovascular: Negative for leg swelling. Skin: Positive for rash (noted between toes and on top of toes, pruritic in nature ). Physical Exam  Constitutional:       Appearance: Normal appearance. She is normal weight. Musculoskeletal:      Right lower leg: No edema. Left lower leg: No edema. Skin:     Findings: Rash (to top of toes and between toes, no draiinage/bleeding noted. toe nails intact ) present. Neurological:      Mental Status: She is alert. Psychiatric:         Mood and Affect: Mood normal.        Assessment/Plan   Petrona was seen today for nail problem and anxiety. Diagnoses and all orders for this visit:    Toenail fungus  -     terbinafine (LAMISIL) 250 MG tablet; Take 1 tablet by mouth daily    Cigarette nicotine dependence with nicotine-induced disorder  -smoking cessation d/w patient, time spent >10 minutes    All questions addressed and answered, patient agrees with plan of care.

## 2020-02-18 ENCOUNTER — TELEPHONE (OUTPATIENT)
Dept: INTERNAL MEDICINE CLINIC | Age: 40
End: 2020-02-18

## 2020-02-18 ENCOUNTER — APPOINTMENT (OUTPATIENT)
Dept: GENERAL RADIOLOGY | Age: 40
DRG: 422 | End: 2020-02-18
Payer: COMMERCIAL

## 2020-02-18 ENCOUNTER — HOSPITAL ENCOUNTER (INPATIENT)
Age: 40
LOS: 2 days | Discharge: LEFT BEFORE DISCHARGE | DRG: 422 | End: 2020-02-20
Attending: EMERGENCY MEDICINE | Admitting: INTERNAL MEDICINE
Payer: COMMERCIAL

## 2020-02-18 LAB
A/G RATIO: 1.2 (ref 1.1–2.2)
ALBUMIN SERPL-MCNC: 4.4 G/DL (ref 3.4–5)
ALP BLD-CCNC: 130 U/L (ref 40–129)
ALT SERPL-CCNC: 34 U/L (ref 10–40)
ANION GAP SERPL CALCULATED.3IONS-SCNC: 17 MMOL/L (ref 3–16)
AST SERPL-CCNC: 13 U/L (ref 15–37)
BACTERIA: ABNORMAL /HPF
BASOPHILS ABSOLUTE: 0.1 K/UL (ref 0–0.2)
BASOPHILS RELATIVE PERCENT: 0.7 %
BILIRUB SERPL-MCNC: 0.4 MG/DL (ref 0–1)
BILIRUBIN URINE: NEGATIVE
BLOOD, URINE: ABNORMAL
BUN BLDV-MCNC: 8 MG/DL (ref 7–20)
CALCIUM SERPL-MCNC: 9.8 MG/DL (ref 8.3–10.6)
CHLORIDE BLD-SCNC: 100 MMOL/L (ref 99–110)
CLARITY: CLEAR
CO2: 23 MMOL/L (ref 21–32)
COLOR: YELLOW
CREAT SERPL-MCNC: 0.6 MG/DL (ref 0.6–1.1)
EOSINOPHILS ABSOLUTE: 0.1 K/UL (ref 0–0.6)
EOSINOPHILS RELATIVE PERCENT: 1 %
EPITHELIAL CELLS, UA: 1 /HPF (ref 0–5)
GFR AFRICAN AMERICAN: >60
GFR NON-AFRICAN AMERICAN: >60
GLOBULIN: 3.8 G/DL
GLUCOSE BLD-MCNC: 105 MG/DL (ref 70–99)
GLUCOSE URINE: NEGATIVE MG/DL
HCG QUALITATIVE: NEGATIVE
HCT VFR BLD CALC: 47.6 % (ref 36–48)
HEMOGLOBIN: 16.3 G/DL (ref 12–16)
HYALINE CASTS: 0 /LPF (ref 0–8)
KETONES, URINE: NEGATIVE MG/DL
LACTIC ACID, SEPSIS: 1.1 MMOL/L (ref 0.4–1.9)
LEUKOCYTE ESTERASE, URINE: ABNORMAL
LIPASE: 18 U/L (ref 13–60)
LYMPHOCYTES ABSOLUTE: 4.2 K/UL (ref 1–5.1)
LYMPHOCYTES RELATIVE PERCENT: 31.1 %
MCH RBC QN AUTO: 30.4 PG (ref 26–34)
MCHC RBC AUTO-ENTMCNC: 34.4 G/DL (ref 31–36)
MCV RBC AUTO: 88.6 FL (ref 80–100)
MICROSCOPIC EXAMINATION: YES
MONOCYTES ABSOLUTE: 0.5 K/UL (ref 0–1.3)
MONOCYTES RELATIVE PERCENT: 3.7 %
NEUTROPHILS ABSOLUTE: 8.6 K/UL (ref 1.7–7.7)
NEUTROPHILS RELATIVE PERCENT: 63.5 %
NITRITE, URINE: POSITIVE
PDW BLD-RTO: 13.4 % (ref 12.4–15.4)
PH UA: 7 (ref 5–8)
PLATELET # BLD: 267 K/UL (ref 135–450)
PMV BLD AUTO: 7.7 FL (ref 5–10.5)
POTASSIUM REFLEX MAGNESIUM: 3.7 MMOL/L (ref 3.5–5.1)
PROTEIN UA: NEGATIVE MG/DL
RBC # BLD: 5.37 M/UL (ref 4–5.2)
RBC UA: 3 /HPF (ref 0–4)
SODIUM BLD-SCNC: 140 MMOL/L (ref 136–145)
SPECIFIC GRAVITY UA: 1.01 (ref 1–1.03)
TOTAL PROTEIN: 8.2 G/DL (ref 6.4–8.2)
URINE REFLEX TO CULTURE: YES
URINE TYPE: ABNORMAL
UROBILINOGEN, URINE: 0.2 E.U./DL
WBC # BLD: 13.6 K/UL (ref 4–11)
WBC UA: 11 /HPF (ref 0–5)

## 2020-02-18 PROCEDURE — 96368 THER/DIAG CONCURRENT INF: CPT

## 2020-02-18 PROCEDURE — 85025 COMPLETE CBC W/AUTO DIFF WBC: CPT

## 2020-02-18 PROCEDURE — 99285 EMERGENCY DEPT VISIT HI MDM: CPT

## 2020-02-18 PROCEDURE — 83605 ASSAY OF LACTIC ACID: CPT

## 2020-02-18 PROCEDURE — 87040 BLOOD CULTURE FOR BACTERIA: CPT

## 2020-02-18 PROCEDURE — 2580000003 HC RX 258: Performed by: EMERGENCY MEDICINE

## 2020-02-18 PROCEDURE — 83690 ASSAY OF LIPASE: CPT

## 2020-02-18 PROCEDURE — 1200000000 HC SEMI PRIVATE

## 2020-02-18 PROCEDURE — 6360000002 HC RX W HCPCS: Performed by: EMERGENCY MEDICINE

## 2020-02-18 PROCEDURE — 87641 MR-STAPH DNA AMP PROBE: CPT

## 2020-02-18 PROCEDURE — 94760 N-INVAS EAR/PLS OXIMETRY 1: CPT

## 2020-02-18 PROCEDURE — 6370000000 HC RX 637 (ALT 250 FOR IP): Performed by: INTERNAL MEDICINE

## 2020-02-18 PROCEDURE — 96365 THER/PROPH/DIAG IV INF INIT: CPT

## 2020-02-18 PROCEDURE — 2580000003 HC RX 258: Performed by: INTERNAL MEDICINE

## 2020-02-18 PROCEDURE — 84703 CHORIONIC GONADOTROPIN ASSAY: CPT

## 2020-02-18 PROCEDURE — 80053 COMPREHEN METABOLIC PANEL: CPT

## 2020-02-18 PROCEDURE — 96375 TX/PRO/DX INJ NEW DRUG ADDON: CPT

## 2020-02-18 PROCEDURE — 87086 URINE CULTURE/COLONY COUNT: CPT

## 2020-02-18 PROCEDURE — 71046 X-RAY EXAM CHEST 2 VIEWS: CPT

## 2020-02-18 PROCEDURE — 81001 URINALYSIS AUTO W/SCOPE: CPT

## 2020-02-18 PROCEDURE — 6360000002 HC RX W HCPCS: Performed by: INTERNAL MEDICINE

## 2020-02-18 RX ORDER — OXYCODONE HYDROCHLORIDE AND ACETAMINOPHEN 5; 325 MG/1; MG/1
1 TABLET ORAL EVERY 4 HOURS PRN
Status: DISCONTINUED | OUTPATIENT
Start: 2020-02-18 | End: 2020-02-20 | Stop reason: HOSPADM

## 2020-02-18 RX ORDER — FAMOTIDINE 20 MG/1
20 TABLET, FILM COATED ORAL 2 TIMES DAILY
Status: DISCONTINUED | OUTPATIENT
Start: 2020-02-18 | End: 2020-02-20 | Stop reason: HOSPADM

## 2020-02-18 RX ORDER — PROMETHAZINE HYDROCHLORIDE 25 MG/ML
12.5 INJECTION, SOLUTION INTRAMUSCULAR; INTRAVENOUS EVERY 6 HOURS PRN
Status: DISCONTINUED | OUTPATIENT
Start: 2020-02-18 | End: 2020-02-19

## 2020-02-18 RX ORDER — SODIUM CHLORIDE 0.9 % (FLUSH) 0.9 %
10 SYRINGE (ML) INJECTION PRN
Status: DISCONTINUED | OUTPATIENT
Start: 2020-02-18 | End: 2020-02-20 | Stop reason: HOSPADM

## 2020-02-18 RX ORDER — 0.9 % SODIUM CHLORIDE 0.9 %
30 INTRAVENOUS SOLUTION INTRAVENOUS ONCE
Status: COMPLETED | OUTPATIENT
Start: 2020-02-18 | End: 2020-02-18

## 2020-02-18 RX ORDER — ONDANSETRON 2 MG/ML
4 INJECTION INTRAMUSCULAR; INTRAVENOUS EVERY 6 HOURS PRN
Status: DISCONTINUED | OUTPATIENT
Start: 2020-02-18 | End: 2020-02-20 | Stop reason: HOSPADM

## 2020-02-18 RX ORDER — OXYCODONE HYDROCHLORIDE AND ACETAMINOPHEN 5; 325 MG/1; MG/1
2 TABLET ORAL EVERY 4 HOURS PRN
Status: DISCONTINUED | OUTPATIENT
Start: 2020-02-18 | End: 2020-02-20 | Stop reason: HOSPADM

## 2020-02-18 RX ORDER — SODIUM CHLORIDE 0.9 % (FLUSH) 0.9 %
10 SYRINGE (ML) INJECTION EVERY 12 HOURS SCHEDULED
Status: DISCONTINUED | OUTPATIENT
Start: 2020-02-18 | End: 2020-02-20 | Stop reason: HOSPADM

## 2020-02-18 RX ORDER — TRAMADOL HYDROCHLORIDE 50 MG/1
50-100 TABLET ORAL EVERY 6 HOURS PRN
COMMUNITY
Start: 2020-02-15 | End: 2020-03-06

## 2020-02-18 RX ORDER — ACETAMINOPHEN 500 MG
1000 TABLET ORAL EVERY 6 HOURS PRN
Status: DISCONTINUED | OUTPATIENT
Start: 2020-02-18 | End: 2020-02-20 | Stop reason: HOSPADM

## 2020-02-18 RX ORDER — SODIUM CHLORIDE, SODIUM LACTATE, POTASSIUM CHLORIDE, CALCIUM CHLORIDE 600; 310; 30; 20 MG/100ML; MG/100ML; MG/100ML; MG/100ML
30 INJECTION, SOLUTION INTRAVENOUS ONCE
Status: COMPLETED | OUTPATIENT
Start: 2020-02-18 | End: 2020-02-18

## 2020-02-18 RX ADMIN — Medication 25 MG: at 18:22

## 2020-02-18 RX ADMIN — Medication 10 ML: at 20:28

## 2020-02-18 RX ADMIN — SODIUM CHLORIDE 2002 ML: 9 INJECTION, SOLUTION INTRAVENOUS at 21:42

## 2020-02-18 RX ADMIN — FAMOTIDINE 20 MG: 20 TABLET ORAL at 21:43

## 2020-02-18 RX ADMIN — HYDROMORPHONE HYDROCHLORIDE 0.5 MG: 1 INJECTION, SOLUTION INTRAMUSCULAR; INTRAVENOUS; SUBCUTANEOUS at 19:51

## 2020-02-18 RX ADMIN — MEROPENEM 1 G: 1 INJECTION, POWDER, FOR SOLUTION INTRAVENOUS at 19:21

## 2020-02-18 RX ADMIN — SODIUM CHLORIDE, POTASSIUM CHLORIDE, SODIUM LACTATE AND CALCIUM CHLORIDE 2202 ML: 600; 310; 30; 20 INJECTION, SOLUTION INTRAVENOUS at 18:22

## 2020-02-18 RX ADMIN — HYDROMORPHONE HYDROCHLORIDE 0.5 MG: 1 INJECTION, SOLUTION INTRAMUSCULAR; INTRAVENOUS; SUBCUTANEOUS at 18:22

## 2020-02-18 RX ADMIN — ENOXAPARIN SODIUM 40 MG: 40 INJECTION SUBCUTANEOUS at 21:43

## 2020-02-18 RX ADMIN — OXYCODONE HYDROCHLORIDE AND ACETAMINOPHEN 2 TABLET: 5; 325 TABLET ORAL at 22:02

## 2020-02-18 RX ADMIN — PROMETHAZINE HYDROCHLORIDE 12.5 MG: 25 INJECTION INTRAMUSCULAR; INTRAVENOUS at 22:02

## 2020-02-18 ASSESSMENT — PAIN DESCRIPTION - ORIENTATION
ORIENTATION: LEFT;RIGHT
ORIENTATION: RIGHT;LEFT

## 2020-02-18 ASSESSMENT — PAIN DESCRIPTION - ONSET
ONSET: ON-GOING

## 2020-02-18 ASSESSMENT — PAIN DESCRIPTION - FREQUENCY
FREQUENCY: CONTINUOUS

## 2020-02-18 ASSESSMENT — ENCOUNTER SYMPTOMS
COUGH: 0
SHORTNESS OF BREATH: 0
EYES NEGATIVE: 1
RESPIRATORY NEGATIVE: 1
BACK PAIN: 1
ABDOMINAL PAIN: 1
NAUSEA: 1

## 2020-02-18 ASSESSMENT — PAIN DESCRIPTION - LOCATION
LOCATION: ABDOMEN;FLANK
LOCATION: FLANK
LOCATION: ABDOMEN
LOCATION: ABDOMEN

## 2020-02-18 ASSESSMENT — PAIN DESCRIPTION - PAIN TYPE
TYPE: ACUTE PAIN

## 2020-02-18 ASSESSMENT — PAIN DESCRIPTION - DESCRIPTORS
DESCRIPTORS: ACHING
DESCRIPTORS: SHARP
DESCRIPTORS: SHARP

## 2020-02-18 ASSESSMENT — PAIN SCALES - GENERAL
PAINLEVEL_OUTOF10: 6
PAINLEVEL_OUTOF10: 9
PAINLEVEL_OUTOF10: 9
PAINLEVEL_OUTOF10: 8
PAINLEVEL_OUTOF10: 9
PAINLEVEL_OUTOF10: 8
PAINLEVEL_OUTOF10: 9

## 2020-02-18 ASSESSMENT — PAIN DESCRIPTION - PROGRESSION
CLINICAL_PROGRESSION: NOT CHANGED

## 2020-02-18 ASSESSMENT — PAIN DESCRIPTION - DIRECTION: RADIATING_TOWARDS: ABD/BACK

## 2020-02-18 ASSESSMENT — PAIN - FUNCTIONAL ASSESSMENT
PAIN_FUNCTIONAL_ASSESSMENT: PREVENTS OR INTERFERES SOME ACTIVE ACTIVITIES AND ADLS
PAIN_FUNCTIONAL_ASSESSMENT: ACTIVITIES ARE NOT PREVENTED
PAIN_FUNCTIONAL_ASSESSMENT: ACTIVITIES ARE NOT PREVENTED
PAIN_FUNCTIONAL_ASSESSMENT: PREVENTS OR INTERFERES SOME ACTIVE ACTIVITIES AND ADLS

## 2020-02-18 NOTE — PROGRESS NOTES
Pharmacy Medication Reconciliation Note     List of medications patient is currently taking is complete. Source of information:   1. Patient  2. EMR    Notes regarding home medications:   1. Patient received all of her morning home medication doses before presenting to the ER. Pt states she threw up after taking her meds though.       [address] [facility], Pharmacy Intern  2/18/2020 6:56 PM

## 2020-02-18 NOTE — ED NOTES
Acknowledged pt by pt's name. Verified pt by name and date of birth. Checked arm band, allergies, reviewed past medical history. Introduced myself to patient  Duration of ED plan of care explained to patient  Explained planned tests and procedures  Thanked patient for coming to [facility].    Asked if there was anything else I could do for the patient before exiting room. CB in reach.      Briana Spurling, RN  02/18/20 9837

## 2020-02-18 NOTE — TELEPHONE ENCOUNTER
Pt wants to speak with Andre Garcia. She has a catheter and can't see the urologist until March 9. She is weak and can't get up. Feels like her heart is going to pound out of her chest. Advised the ER. She is vomiting, back pain, temp 101. Please call.

## 2020-02-18 NOTE — ED PROVIDER NOTES
[facility]        Pt Name: Nicky Weber  MRN: [id number]  [place] 1980  Date of evaluation: 2/18/2020  Provider: Umm Dale MD  PCP: FREDRICK Schultz - CNP      CHIEF COMPLAINT       Chief Complaint   Patient presents with    Urinary Tract Infection     urethral catheter for urinary retention. PCP sentt to ED to rule out sepsis per pt report       HISTORY OFPRESENT ILLNESS   (Location/Symptom, Timing/Onset, Context/Setting, Quality, Duration, Modifying Factors,Severity)  Note limiting factors. Nicky Weber is a 42 y.o. female with history of ESBL UTIs, chronic urinary retention with indwelling Guzman catheter, presenting due to abdominal and flank pain as well as mid back pain, similar to prior episodes of UTI, with fevers at home, nausea, vomiting, loss of appetite. All of this feels similar to prior UTIs. She also reports history history of nephrolithiasis, however pain is bilateral so this is dissimilar. She spoke to her primary care provider's office who felt that she needed emergency department evaluation given her history of ESBL organisms requiring IV meropenem as an outpatient and history of sepsis secondary to UTI. 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 No neurologic symptoms. No ataxia. Nursing Notes were all reviewed and agreed with or any disagreements were addressed  in the HPI. REVIEW OF SYSTEMS    (2-9 systems for level 4, 10 or more for level 5)     Review of Systems   Constitutional: Positive for appetite change, chills and fever. HENT: Negative. Eyes: Negative. Respiratory: Negative. Negative for cough and shortness of breath. Cardiovascular: Negative. Negative for chest pain, palpitations and leg swelling. Gastrointestinal: Positive for abdominal pain and nausea. Genitourinary: Positive for flank pain. Negative for dysuria. Musculoskeletal: Positive for back pain. Skin: Negative. Neurological: Positive for light-headedness. Negative for weakness and headaches. Hematological: Negative. Psychiatric/Behavioral: Negative. PAST MEDICAL HISTORY     Past Medical History:   Diagnosis Date    ADHD (attention deficit hyperactivity disorder) 82    Anesthesia complication     pt states with general anesthesia, she gets very nauseated and wakes up with a migrain     Depression with anxiety     Difficult intubation     ESBL (extended spectrum beta-lactamase) producing bacteria infection 2019    urine    Functional ovarian cysts 2008    rt ovary cyst x 2 yrs.     Headache(784.0)     migraines    Hiatal hernia     History of blood transfusion     History of kidney stones     History of PCOS     Hydrocephalus (HCC)     Irritable bowel syndrome 2004    had colonoscopy about 6 yrs ago    Kidney stone     Meningitis     Neutrophilic leukocytosis     Nicotine dependence     Primary osteoarthritis of left knee 2016    S/P cone biopsy of cervix 2004    Scoliosis .s     (ventriculoperitoneal) shunt status     hydrocephalus f/w Neurosurgeon at [place] [id number] Wears glasses     reading         SURGICAL HISTORY     Past Surgical History:   Procedure Laterality Date    APPENDECTOMY      appendicitis     SECTION      placenta previa    CHOLECYSTECTOMY      COLONOSCOPY  2004    COLPOSCOPY      CSF SHUNT      replaced at age 17    CYSTOSCOPY      stone removal   [facility]    inguinal    HYSTERECTOMY, TOTAL ABDOMINAL  2016    TAHBSO, adhesions/PCOS    KNEE ARTHROSCOPY Left 2015    medial meniscectomy, chondroplasty, plica resection    LITHOTRIPSY Bilateral 12/10/2019    OTHER SURGICAL HISTORY  10/19/2016    op lap    VENTRICULOPERITONEAL SHUNT      multiple revisions, most recent          CURRENTMEDICATIONS       Previous Medications    TERBINAFINE (LAMISIL) 250 MG TABLET    Take 1 tablet by mouth daily    TRAMADOL (ULTRAM) 50 MG TABLET    Take  mg by mouth every 6 hours as needed. ALLERGIES     Bentyl [dicyclomine hcl]; Mushroom extract complex; Sulfa antibiotics; Vancomycin; Adhesive tape; Bee venom; Hydrocodone; Levaquin [levofloxacin]; Toradol [ketorolac tromethamine]; Ceftaroline; Daptomycin; Dicyclomine; Fioricet-codeine [butalbital-apap-caff-cod];  Prochlorperazine; and Zosyn [piperacillin sod-tazobactam so]    FAMILY HISTORY       Family History   Problem Relation Age of Onset    High Blood Pressure Mother     Diabetes Mother     High Cholesterol Mother     Depression Mother     Diabetes Maternal Grandmother     High Blood Pressure Maternal Grandmother     High Cholesterol Maternal Grandmother     Heart Disease Maternal Grandfather     High Blood Pressure Maternal Grandfather     Heart Disease Paternal Grandmother     Stroke Paternal Grandfather     Depression Sister     Cirrhosis Father     Rheum Arthritis Neg Hx     Osteoarthritis Neg Hx     Asthma Neg Hx     Breast Cancer Neg Hx     Cancer Neg Hx     Heart Failure Neg Hx     Hypertension Neg Hx     Migraines Neg Hx     Ovarian Cancer Neg Hx     Rashes/Skin Problems Neg Hx     Seizures Neg Hx     Thyroid Disease Neg Hx           SOCIAL HISTORY       Social History     Socioeconomic History    Marital status: Single     Spouse name: None    Number of children: None    Years of education: None    Highest education level: None   Occupational History    Occupation: disability, SSI    Social Needs    Financial resource strain: None    Food insecurity:     Worry: None     Inability: None    Transportation needs:     Medical: None     Non-medical: None   Tobacco Use    Smoking status: Current Every Day Smoker     Packs/day: 0.50     Years: 18.00     Pack years: 9.00     Types: Cigarettes    Smokeless tobacco: Never Used   Substance and Sexual Activity    performed during the hospital encounter of 02/18/20   HCG Qualitative, Serum   Result Value Ref Range    hCG Qual Negative Detects HCG level >10 MIU/mL   CBC Auto Differential   Result Value Ref Range    WBC 13.6 (H) 4.0 - 11.0 K/uL    RBC 5.37 (H) 4.00 - 5.20 M/uL    Hemoglobin 16.3 (H) 12.0 - 16.0 g/dL    Hematocrit 47.6 36.0 - 48.0 %    MCV 88.6 80.0 - 100.0 fL    MCH 30.4 26.0 - 34.0 pg    MCHC 34.4 31.0 - 36.0 g/dL    RDW 13.4 12.4 - 15.4 %    Platelets 684 020 - 831 K/uL    MPV 7.7 5.0 - 10.5 fL    Neutrophils % 63.5 %    Lymphocytes % 31.1 %    Monocytes % 3.7 %    Eosinophils % 1.0 %    Basophils % 0.7 %    Neutrophils Absolute 8.6 (H) 1.7 - 7.7 K/uL    Lymphocytes Absolute 4.2 1.0 - 5.1 K/uL    Monocytes Absolute 0.5 0.0 - 1.3 K/uL    Eosinophils Absolute 0.1 0.0 - 0.6 K/uL    Basophils Absolute 0.1 0.0 - 0.2 K/uL   Comprehensive Metabolic Panel w/ Reflex to MG   Result Value Ref Range    Sodium 140 136 - 145 mmol/L    Potassium reflex Magnesium 3.7 3.5 - 5.1 mmol/L    Chloride 100 99 - 110 mmol/L    CO2 23 21 - 32 mmol/L    Anion Gap 17 (H) 3 - 16    Glucose 105 (H) 70 - 99 mg/dL    BUN 8 7 - 20 mg/dL    CREATININE 0.6 0.6 - 1.1 mg/dL    GFR Non-African American >60 >60    GFR African American >60 >60    Calcium 9.8 8.3 - 10.6 mg/dL    Total Protein 8.2 6.4 - 8.2 g/dL    Alb 4.4 3.4 - 5.0 g/dL    Albumin/Globulin Ratio 1.2 1.1 - 2.2    Total Bilirubin 0.4 0.0 - 1.0 mg/dL    Alkaline Phosphatase 130 (H) 40 - 129 U/L    ALT 34 10 - 40 U/L    AST 13 (L) 15 - 37 U/L    Globulin 3.8 g/dL   Lipase   Result Value Ref Range    Lipase 18.0 13.0 - 60.0 U/L   Urinalysis Reflex to Culture   Result Value Ref Range    Color, UA YELLOW Straw/Yellow    Clarity, UA Clear Clear    Glucose, Ur Negative Negative mg/dL    Bilirubin Urine Negative Negative    Ketones, Urine Negative Negative mg/dL    Specific Gravity, UA 1.006 1.005 - 1.030    Blood, Urine MODERATE (A) Negative    pH, UA 7.0 5.0 - 8.0    Protein, UA Negative

## 2020-02-19 ENCOUNTER — APPOINTMENT (OUTPATIENT)
Dept: CT IMAGING | Age: 40
DRG: 422 | End: 2020-02-19
Payer: COMMERCIAL

## 2020-02-19 PROBLEM — E86.9 VOLUME DEPLETION: Status: ACTIVE | Noted: 2020-02-19

## 2020-02-19 LAB
BACTERIA: ABNORMAL /HPF
BILIRUBIN URINE: NEGATIVE
BLOOD, URINE: ABNORMAL
CLARITY: ABNORMAL
COLOR: YELLOW
COMMENT UA: ABNORMAL
EPITHELIAL CELLS, UA: 2 /HPF (ref 0–5)
GLUCOSE URINE: NEGATIVE MG/DL
KETONES, URINE: NEGATIVE MG/DL
LACTIC ACID, SEPSIS: 1.6 MMOL/L (ref 0.4–1.9)
LEUKOCYTE ESTERASE, URINE: ABNORMAL
LIPASE: 23 U/L (ref 13–60)
MICROSCOPIC EXAMINATION: YES
MRSA SCREEN RT-PCR: NORMAL
MUCUS: ABNORMAL /LPF
NITRITE, URINE: NEGATIVE
PH UA: 6.5 (ref 5–8)
PROTEIN UA: 100 MG/DL
RBC UA: 22 /HPF (ref 0–4)
SPECIFIC GRAVITY UA: 1.02 (ref 1–1.03)
URINE CULTURE, ROUTINE: NORMAL
URINE TYPE: ABNORMAL
UROBILINOGEN, URINE: 0.2 E.U./DL
WBC UA: 70 /HPF (ref 0–5)

## 2020-02-19 PROCEDURE — 87086 URINE CULTURE/COLONY COUNT: CPT

## 2020-02-19 PROCEDURE — 83690 ASSAY OF LIPASE: CPT

## 2020-02-19 PROCEDURE — 94760 N-INVAS EAR/PLS OXIMETRY 1: CPT

## 2020-02-19 PROCEDURE — 83605 ASSAY OF LACTIC ACID: CPT

## 2020-02-19 PROCEDURE — 74177 CT ABD & PELVIS W/CONTRAST: CPT

## 2020-02-19 PROCEDURE — 6360000002 HC RX W HCPCS: Performed by: NURSE PRACTITIONER

## 2020-02-19 PROCEDURE — 81001 URINALYSIS AUTO W/SCOPE: CPT

## 2020-02-19 PROCEDURE — 2580000003 HC RX 258: Performed by: INTERNAL MEDICINE

## 2020-02-19 PROCEDURE — 2580000003 HC RX 258: Performed by: NURSE PRACTITIONER

## 2020-02-19 PROCEDURE — 6360000002 HC RX W HCPCS: Performed by: INTERNAL MEDICINE

## 2020-02-19 PROCEDURE — 6370000000 HC RX 637 (ALT 250 FOR IP): Performed by: NURSE PRACTITIONER

## 2020-02-19 PROCEDURE — 1200000000 HC SEMI PRIVATE

## 2020-02-19 PROCEDURE — 6370000000 HC RX 637 (ALT 250 FOR IP): Performed by: INTERNAL MEDICINE

## 2020-02-19 PROCEDURE — 6360000004 HC RX CONTRAST MEDICATION: Performed by: NURSE PRACTITIONER

## 2020-02-19 RX ORDER — SENNA AND DOCUSATE SODIUM 50; 8.6 MG/1; MG/1
2 TABLET, FILM COATED ORAL 2 TIMES DAILY
Status: DISCONTINUED | OUTPATIENT
Start: 2020-02-19 | End: 2020-02-20 | Stop reason: HOSPADM

## 2020-02-19 RX ORDER — PROMETHAZINE HYDROCHLORIDE 25 MG/ML
12.5 INJECTION, SOLUTION INTRAMUSCULAR; INTRAVENOUS EVERY 6 HOURS PRN
Status: DISCONTINUED | OUTPATIENT
Start: 2020-02-19 | End: 2020-02-19

## 2020-02-19 RX ORDER — POLYETHYLENE GLYCOL 3350 17 G/17G
17 POWDER, FOR SOLUTION ORAL ONCE
Status: COMPLETED | OUTPATIENT
Start: 2020-02-19 | End: 2020-02-19

## 2020-02-19 RX ORDER — MAGNESIUM CARB/ALUMINUM HYDROX 105-160MG
30 TABLET,CHEWABLE ORAL DAILY PRN
Status: DISCONTINUED | OUTPATIENT
Start: 2020-02-19 | End: 2020-02-20 | Stop reason: HOSPADM

## 2020-02-19 RX ORDER — IBUPROFEN 400 MG/1
400 TABLET ORAL EVERY 6 HOURS PRN
Status: DISCONTINUED | OUTPATIENT
Start: 2020-02-19 | End: 2020-02-20 | Stop reason: HOSPADM

## 2020-02-19 RX ORDER — PHENAZOPYRIDINE HYDROCHLORIDE 200 MG/1
200 TABLET, FILM COATED ORAL 3 TIMES DAILY PRN
Status: DISCONTINUED | OUTPATIENT
Start: 2020-02-19 | End: 2020-02-20 | Stop reason: HOSPADM

## 2020-02-19 RX ORDER — LACTOBACILLUS RHAMNOSUS GG 10B CELL
1 CAPSULE ORAL 2 TIMES DAILY WITH MEALS
Status: DISCONTINUED | OUTPATIENT
Start: 2020-02-19 | End: 2020-02-20 | Stop reason: HOSPADM

## 2020-02-19 RX ADMIN — PROMETHAZINE HYDROCHLORIDE 12.5 MG: 25 INJECTION INTRAMUSCULAR; INTRAVENOUS at 14:17

## 2020-02-19 RX ADMIN — PHENAZOPYRIDINE 200 MG: 200 TABLET ORAL at 11:05

## 2020-02-19 RX ADMIN — Medication 10 ML: at 01:11

## 2020-02-19 RX ADMIN — PHENAZOPYRIDINE 200 MG: 200 TABLET ORAL at 01:12

## 2020-02-19 RX ADMIN — OXYCODONE HYDROCHLORIDE AND ACETAMINOPHEN 2 TABLET: 5; 325 TABLET ORAL at 16:22

## 2020-02-19 RX ADMIN — IOPAMIDOL 75 ML: 755 INJECTION, SOLUTION INTRAVENOUS at 15:44

## 2020-02-19 RX ADMIN — Medication 1 CAPSULE: at 16:22

## 2020-02-19 RX ADMIN — MEROPENEM 500 MG: 500 INJECTION, POWDER, FOR SOLUTION INTRAVENOUS at 01:11

## 2020-02-19 RX ADMIN — MEROPENEM 500 MG: 500 INJECTION, POWDER, FOR SOLUTION INTRAVENOUS at 12:49

## 2020-02-19 RX ADMIN — MEROPENEM 500 MG: 500 INJECTION, POWDER, FOR SOLUTION INTRAVENOUS at 08:54

## 2020-02-19 RX ADMIN — OXYCODONE HYDROCHLORIDE AND ACETAMINOPHEN 2 TABLET: 5; 325 TABLET ORAL at 20:35

## 2020-02-19 RX ADMIN — FAMOTIDINE 20 MG: 20 TABLET ORAL at 08:54

## 2020-02-19 RX ADMIN — OXYCODONE HYDROCHLORIDE AND ACETAMINOPHEN 2 TABLET: 5; 325 TABLET ORAL at 11:05

## 2020-02-19 RX ADMIN — MEROPENEM 500 MG: 500 INJECTION, POWDER, FOR SOLUTION INTRAVENOUS at 18:03

## 2020-02-19 RX ADMIN — OXYCODONE HYDROCHLORIDE AND ACETAMINOPHEN 2 TABLET: 5; 325 TABLET ORAL at 02:46

## 2020-02-19 RX ADMIN — IBUPROFEN 400 MG: 400 TABLET ORAL at 19:19

## 2020-02-19 RX ADMIN — OXYCODONE HYDROCHLORIDE AND ACETAMINOPHEN 2 TABLET: 5; 325 TABLET ORAL at 06:56

## 2020-02-19 RX ADMIN — Medication 10 ML: at 21:38

## 2020-02-19 RX ADMIN — Medication 10 ML: at 08:54

## 2020-02-19 RX ADMIN — FAMOTIDINE 20 MG: 20 TABLET ORAL at 21:38

## 2020-02-19 RX ADMIN — POLYETHYLENE GLYCOL 3350 17 G: 17 POWDER, FOR SOLUTION ORAL at 16:31

## 2020-02-19 RX ADMIN — ENOXAPARIN SODIUM 40 MG: 40 INJECTION SUBCUTANEOUS at 21:37

## 2020-02-19 ASSESSMENT — PAIN DESCRIPTION - ORIENTATION
ORIENTATION: RIGHT;LEFT

## 2020-02-19 ASSESSMENT — PAIN SCALES - GENERAL
PAINLEVEL_OUTOF10: 8
PAINLEVEL_OUTOF10: 9
PAINLEVEL_OUTOF10: 7
PAINLEVEL_OUTOF10: 0
PAINLEVEL_OUTOF10: 9
PAINLEVEL_OUTOF10: 6
PAINLEVEL_OUTOF10: 8
PAINLEVEL_OUTOF10: 7
PAINLEVEL_OUTOF10: 5
PAINLEVEL_OUTOF10: 4
PAINLEVEL_OUTOF10: 7

## 2020-02-19 ASSESSMENT — PAIN DESCRIPTION - FREQUENCY
FREQUENCY: CONTINUOUS
FREQUENCY: INTERMITTENT
FREQUENCY: CONTINUOUS

## 2020-02-19 ASSESSMENT — PAIN DESCRIPTION - PROGRESSION
CLINICAL_PROGRESSION: GRADUALLY WORSENING
CLINICAL_PROGRESSION: NOT CHANGED
CLINICAL_PROGRESSION: GRADUALLY IMPROVING
CLINICAL_PROGRESSION: GRADUALLY IMPROVING

## 2020-02-19 ASSESSMENT — PAIN DESCRIPTION - ONSET
ONSET: ON-GOING

## 2020-02-19 ASSESSMENT — PAIN - FUNCTIONAL ASSESSMENT
PAIN_FUNCTIONAL_ASSESSMENT: ACTIVITIES ARE NOT PREVENTED

## 2020-02-19 ASSESSMENT — PAIN DESCRIPTION - DESCRIPTORS
DESCRIPTORS: DISCOMFORT
DESCRIPTORS: SHARP
DESCRIPTORS: ACHING
DESCRIPTORS: SHARP
DESCRIPTORS: ACHING
DESCRIPTORS: SHARP
DESCRIPTORS: DISCOMFORT;SHARP;ACHING

## 2020-02-19 ASSESSMENT — PAIN DESCRIPTION - PAIN TYPE
TYPE: ACUTE PAIN

## 2020-02-19 ASSESSMENT — PAIN DESCRIPTION - LOCATION
LOCATION: FLANK
LOCATION: BACK;FLANK;HEAD
LOCATION: HEAD
LOCATION: FLANK
LOCATION: BACK;FLANK

## 2020-02-19 ASSESSMENT — PAIN DESCRIPTION - DIRECTION
RADIATING_TOWARDS: ABD/BACK

## 2020-02-19 NOTE — PROGRESS NOTES
Patient states she is experiencing some swelling in her hands. Instructed patient it may be related to the bolus of fluids she received earlier in shift. Will report off to day shift RN to follow up. Some slight swelling noted in bilateral hands and fingers. No needs at this time. Patient resting comfortably in bed. Call light in reach. Will continue to monitor.    Electronically signed by Mariya Mendieta RN on 2/19/2020 at 7:02 AM

## 2020-02-19 NOTE — PROGRESS NOTES
4 Eyes Skin Assessment     The patient is being assess for  Admission    I agree that 2 RN's have performed a thorough Head to Toe Skin Assessment on the patient. ALL assessment sites listed below have been assessed. Areas assessed by both nurses:   [x]   Head, Face, and Ears   [x]   Shoulders, Back, and Chest  [x]   Arms, Qh6ovum, and Hands   [x]   Coccyx, Sacrum, and IschIum  [x]   Legs, Feet, and Heels        Does the Patient have Skin Breakdown?   No         Noel Prevention initiated:  No   Wound Care Orders initiated:  No      [facility] nurse consulted for Pressure Injury (Stage 3,4, Unstageable, DTI, NWPT, and Complex wounds), New and Established Ostomies:  No      Nurse 1 eSignature: Electronically signed by Jaylen Carnes RN on 2/19/20 at 2:08 AM    Nurse 2 eSignature: Electronically signed by Glenn Escobar RN on 2/19/2020 at 6:11 AM

## 2020-02-19 NOTE — PROGRESS NOTES
Having great difficulty getting an order for pyridium. Response from MD states \"to what end\". The response from the provider was questioned as to what that statement is supposed to mean. Still attempting to get order for pyridium. No needs at this time. Patient resting comfortably in bed. Call light in reach. Will continue to monitor.    Electronically signed by Briana Smart RN on 2/19/2020 at 12:28 AM

## 2020-02-19 NOTE — PROGRESS NOTES
Patient in bed resting comfortably. Respirations steady and unlabored. No signs of respiratory or cardiac distress. No complaints of pain at this time. IVF infusing as ordered. No needs at this time. Call light in reach. Will continue to monitor.   Electronically signed by Sharif Scherer RN on 2/19/2020 at 2:05 AM

## 2020-02-19 NOTE — PLAN OF CARE
Problem: Pain:  Goal: Pain level will decrease  Description  Pain level will decrease  2/19/2020 0829 by Marylene Nations, RN  Outcome: Ongoing  2/19/2020 0206 by Lawyer Rajan RN  Outcome: Ongoing     Problem: Falls - Risk of:  Goal: Will remain free from falls  Description  Will remain free from falls  Outcome: Ongoing     Problem: Nausea/Vomiting:  Goal: Absence of nausea/vomiting  Description  Absence of nausea/vomiting  2/19/2020 0829 by Marylene Nations, RN  Outcome: Ongoing  2/19/2020 0206 by Lawyer Rajan RN  Outcome: Ongoing     Problem: Urinary Elimination:  Goal: Signs and symptoms of infection will decrease  Description  Signs and symptoms of infection will decrease  2/19/2020 0829 by Marylene Nations, RN  Outcome: Ongoing  2/19/2020 0206 by Lawyer Rajan RN  Outcome: Ongoing

## 2020-02-19 NOTE — PROGRESS NOTES
Called from pharmacist and instructed that Dr. Jacoby Taylor is not discontinuing and ordering a different antibiotic than the unasyn. Patient states she is allergic to this med and has difficulty breathing with it. MD states that he does not believe that she is allergic to the med and states that shortness of breath is not an allergy. Per pharmacist MD states to just put that patient is refusing antibiotic rather than him ordering another med. Patient is also c/o bladder spasms after changing of silva cath. Asked for pyridium order. Waiting for response on MD for this medication. No needs at this time. Patient resting comfortably in bed. Call light in reach. Will continue to monitor.    Electronically signed by Trever Vivar RN on 2/19/2020 at 12:24 AM

## 2020-02-19 NOTE — PROGRESS NOTES
Patient complaining of nausea, medicated with phenergan IM. Will continue to monitor.   Electronically signed by Jaylen Carnes RN on 2/19/2020 at 2:09 AM

## 2020-02-19 NOTE — PROGRESS NOTES
Hospital Medicine Progress Note      Admit Date: 2/18/2020         Overnight Events: none    CC: F/U for flank/lower abd pain    HPI:   This a 26-year-old female with a history of chronic hydrocephalus with  shunt, OA, IBS, nephrolithiasis, previous ESBL producing infection who presented to the ED with complaints of abdominal/flank pain and mid back pain. She is had subjective fevers at home with nausea vomiting and loss of appetite. Kept having pain in my back and it wraps to the front. Has been present for ~ 1 week. Went to PCP and was instructed to come in to the hospital.   C/o sob from laying in the bed. Having fevers at home 101. Using tylenol for fever with some help. Pain is not improved and states that the percocet helps but      Interval History/Subjective:   Patient has multiple complaints including fever, flank pain, thought that her urine was turning bloody, short of breath, palpitations  States that she had a fever at home however is not had any fevers here    Review of Systems:     Comprehensive ROS negative except as mentioned above. Past Medical History:        Diagnosis Date    ADHD (attention deficit hyperactivity disorder) 82    Anesthesia complication     pt states with general anesthesia, she gets very nauseated and wakes up with a migrain     Depression with anxiety     Difficult intubation     ESBL (extended spectrum beta-lactamase) producing bacteria infection 11/06/2019    urine    Functional ovarian cysts 2008    rt ovary cyst x 2 yrs.     Headache(784.0)     migraines    Hiatal hernia     History of blood transfusion     History of kidney stones     History of PCOS     Hydrocephalus (HCC)     Irritable bowel syndrome 2004    had colonoscopy about 6 yrs ago    Kidney stone     Meningitis 92/3037    Neutrophilic leukocytosis     Nicotine dependence     Primary osteoarthritis of left knee 7/1/2016    S/P cone biopsy of cervix 2004    Scoliosis 1990.s     (ventriculoperitoneal) shunt status     hydrocephalus f/w Neurosurgeon at [place] [address] Wears glasses     reading       Past Surgical History:        Procedure Laterality Date    APPENDECTOMY  2003    appendicitis     SECTION      placenta previa    CHOLECYSTECTOMY      COLONOSCOPY  2004    COLPOSCOPY      CSF SHUNT      replaced at age 17    CYSTOSCOPY      stone removal   [facility]    inguinal    HYSTERECTOMY, TOTAL ABDOMINAL  2016    TAHBSO, adhesions/PCOS    KNEE ARTHROSCOPY Left 2015    medial meniscectomy, chondroplasty, plica resection    LITHOTRIPSY Bilateral 12/10/2019    OTHER SURGICAL HISTORY  10/19/2016    op lap    VENTRICULOPERITONEAL SHUNT      multiple revisions, most recent        Allergies:  Bentyl [dicyclomine hcl]; Mushroom extract complex; Sulfa antibiotics; Vancomycin; Adhesive tape; Bee venom; Hydrocodone; Levaquin [levofloxacin]; Toradol [ketorolac tromethamine]; Ceftaroline; Daptomycin; Dicyclomine; Fioricet-codeine [butalbital-apap-caff-cod]; Prochlorperazine; and Zosyn [piperacillin sod-tazobactam so]    Past medical and surgical history reviewed. Any changes have been noted.      Scheduled and prn Medications:    Scheduled Meds:   polyethylene glycol  17 g Oral Once    sennosides-docusate sodium  2 tablet Oral BID    lactobacillus  1 capsule Oral BID WC    sodium chloride flush  10 mL Intravenous 2 times per day    enoxaparin  40 mg Subcutaneous Nightly    famotidine  20 mg Oral BID    meropenem  500 mg Intravenous Q6H     Continuous Infusions:  PRN Meds:.phenazopyridine, promethazine, ibuprofen, mineral oil, sodium chloride flush, acetaminophen, ondansetron, oxyCODONE-acetaminophen **OR** oxyCODONE-acetaminophen    PHYSICAL EXAM:  BP 97/63   Pulse 73   Temp 98.6 °F (37 °C)   Resp 16   Ht 4' 11\" (1.499 m)   Wt 159 lb 6.3 oz (72.3 kg)   LMP 10/31/2016 (Exact Date)   SpO2 96%   BMI 32.19 kg/m²       Intake/Output Summary above   -Follow urine culture closely    IBS- predominant constipation?  - large amount of stool noted on CT  - needs bowel regimen- started  - stable at this time    Chronic urinary retention? Vs intermittent  - [place] Urology at [address]  - intermittent episodes of urinary retention?  - unsure what this is related to   - consulted Urology for further evaluation    Chronic pain syndrome  -With narcotic dependence   -Patient states this is related to her chronic shunt  -Previously following with pain management however, patient tells me that her sister called her specialist office and told them that she was selling her pills and they stopped seeing her    Chronic hydrocephalus with  shunt  - stable    Depression/anxiety  - no meds at this time    Obese  Body mass index is 32.19 kg/m². The patient and / or the family were informed of the results of any tests, a time was given to answer questions, a plan was proposed and they agreed with plan.     DVT prophylaxis: [x] Lovenox  [] SQ Heparin  [] SCDs because of  [] warfarin/oral direct thrombin inhibitor [] Encourage ambulation    GI prophylaxis: [] PPI/[id number]  [x] not indicated    Probiotic if on abx: [x] Yes [] No [] Not Indicated    Diet: DIET GENERAL;    Consults:  None    Disposition:  [] Home  [] Home with home health [] Rehab [] Psych [] SNF  [] LTAC  [] Long term nursing home or group home [] Transfer to ICU  [] Transfer to PCU [] Other:    Code Status: Full Code    ELOS: tbd      FREDRICK Allan - AARON  02/19/20

## 2020-02-19 NOTE — PLAN OF CARE
Pain/discomfort being managed with PRN analgesics per MD orders. Pt able to express presence and absence of pain and rate pain appropriately using numerical scale. Pt free from falls this shift. Fall precautions in place at all times. Call light always within reach. Pt able and agreeable to contact for safety appropriately. Patient currently being treated for nausea at this time. Patient currently being treated for a urinary tract infection at this time.

## 2020-02-19 NOTE — H&P
 CHOLECYSTECTOMY      COLONOSCOPY  2004    COLPOSCOPY  2004    CSF SHUNT      replaced at age 17    CYSTOSCOPY      stone removal   [facility]    inguinal    HYSTERECTOMY, TOTAL ABDOMINAL  11/09/2016    TAHBSO, adhesions/PCOS    KNEE ARTHROSCOPY Left 1/7/2015    medial meniscectomy, chondroplasty, plica resection    LITHOTRIPSY Bilateral 12/10/2019    OTHER SURGICAL HISTORY  10/19/2016    op lap    VENTRICULOPERITONEAL SHUNT      multiple revisions, most recent 2015       Medications Prior to Admission:    No current facility-administered medications on file prior to encounter. Current Outpatient Medications on File Prior to Encounter   Medication Sig Dispense Refill    traMADol (ULTRAM) 50 MG tablet Take  mg by mouth every 6 hours as needed.  terbinafine (LAMISIL) 250 MG tablet Take 1 tablet by mouth daily 42 tablet 0       Allergies:  Bentyl [dicyclomine hcl]; Mushroom extract complex; Sulfa antibiotics; Vancomycin; Adhesive tape; Bee venom; Hydrocodone; Levaquin [levofloxacin]; Toradol [ketorolac tromethamine]; Ceftaroline; Daptomycin; Dicyclomine; Fioricet-codeine [butalbital-apap-caff-cod]; Prochlorperazine; and Zosyn [piperacillin sod-tazobactam so]    Social History:   TOBACCO:   reports that she has been smoking cigarettes. She has a 9.00 pack-year smoking history. She has never used smokeless tobacco.     ETOH:   reports no history of alcohol use.       Family History:       Problem Relation Age of Onset    High Blood Pressure Mother     Diabetes Mother     High Cholesterol Mother     Depression Mother     Diabetes Maternal Grandmother     High Blood Pressure Maternal Grandmother     High Cholesterol Maternal Grandmother     Heart Disease Maternal Grandfather     High Blood Pressure Maternal Grandfather     Heart Disease Paternal Grandmother     Stroke Paternal Grandfather     Depression Sister     Cirrhosis Father     Rheum Arthritis Neg Hx        CO2 23   BUN 8   CREATININE 0.6   GLUCOSE 105*     Recent Labs     02/18/20  1814   AST 13*   ALT 34   BILITOT 0.4   ALKPHOS 130*     No results for input(s): TROPONINI in the last 72 hours. No results for input(s): BNP in the last 72 hours. No results found for: PHART, GZB8OHB, PO2ART  No results for input(s): INR in the last 72 hours. Recent Labs     02/19/20  0042   COLORU Yellow   PHUR 6.5   WBCUA 70*   RBCUA 22*   MUCUS 1+*   BACTERIA RARE*   CLARITYU CLOUDY*   SPECGRAV 1.025   LEUKOCYTESUR SMALL*   UROBILINOGEN 0.2   BILIRUBINUR Negative   BLOODU MODERATE*   GLUCOSEU Negative        EKG Independently reviewed: sinus rhythm    PCXR Independently reviewed: non-acute chest        Assessment & Plan: Active Hospital Problems    Diagnosis Date Noted    Volume depletion [E86.9] 59/04/9480    Complicated UTI (urinary tract infection) [N39.0] 11/07/2019    Sepsis ([address] [A41.9] 06/30/2018           1. IV fluid resuscitation therapy, blood and urine culture, empiric broad-spectrum IV antibiotics, meropenem and Unasyn, PRN analgesics, PRN antiemetics,  2. The patient and / or the family were informed of the results of any tests, a time was given to answer questions, a plan was proposed and they agreed with plan. Thank you Dr. Princess Otoole, APRN - CNP for the opportunity to be involved in this patients care. If you have any questions or concerns please feel free to contact me at [phone].   Full Code    Jessica Lau MD  2/19/2020

## 2020-02-20 ENCOUNTER — TELEPHONE (OUTPATIENT)
Dept: INTERNAL MEDICINE CLINIC | Age: 40
End: 2020-02-20

## 2020-02-20 VITALS
WEIGHT: 166.01 LBS | HEART RATE: 72 BPM | RESPIRATION RATE: 18 BRPM | DIASTOLIC BLOOD PRESSURE: 73 MMHG | BODY MASS INDEX: 33.47 KG/M2 | TEMPERATURE: 98.3 F | HEIGHT: 59 IN | SYSTOLIC BLOOD PRESSURE: 109 MMHG | OXYGEN SATURATION: 96 %

## 2020-02-20 LAB
ALBUMIN SERPL-MCNC: 3.5 G/DL (ref 3.4–5)
ANION GAP SERPL CALCULATED.3IONS-SCNC: 13 MMOL/L (ref 3–16)
BUN BLDV-MCNC: 11 MG/DL (ref 7–20)
CALCIUM SERPL-MCNC: 8.7 MG/DL (ref 8.3–10.6)
CHLORIDE BLD-SCNC: 103 MMOL/L (ref 99–110)
CO2: 23 MMOL/L (ref 21–32)
CREAT SERPL-MCNC: 0.7 MG/DL (ref 0.6–1.1)
GFR AFRICAN AMERICAN: >60
GFR NON-AFRICAN AMERICAN: >60
GLUCOSE BLD-MCNC: 121 MG/DL (ref 70–99)
HCT VFR BLD CALC: 37.1 % (ref 36–48)
HEMOGLOBIN: 12.8 G/DL (ref 12–16)
MCH RBC QN AUTO: 30.9 PG (ref 26–34)
MCHC RBC AUTO-ENTMCNC: 34.6 G/DL (ref 31–36)
MCV RBC AUTO: 89.6 FL (ref 80–100)
PDW BLD-RTO: 13.1 % (ref 12.4–15.4)
PHOSPHORUS: 4 MG/DL (ref 2.5–4.9)
PLATELET # BLD: 201 K/UL (ref 135–450)
PMV BLD AUTO: 8.4 FL (ref 5–10.5)
POTASSIUM SERPL-SCNC: 3.5 MMOL/L (ref 3.5–5.1)
RBC # BLD: 4.14 M/UL (ref 4–5.2)
SODIUM BLD-SCNC: 139 MMOL/L (ref 136–145)
URINE CULTURE, ROUTINE: NORMAL
WBC # BLD: 7.5 K/UL (ref 4–11)

## 2020-02-20 PROCEDURE — 80069 RENAL FUNCTION PANEL: CPT

## 2020-02-20 PROCEDURE — 6360000002 HC RX W HCPCS: Performed by: NURSE PRACTITIONER

## 2020-02-20 PROCEDURE — 6370000000 HC RX 637 (ALT 250 FOR IP): Performed by: INTERNAL MEDICINE

## 2020-02-20 PROCEDURE — 6370000000 HC RX 637 (ALT 250 FOR IP): Performed by: NURSE PRACTITIONER

## 2020-02-20 PROCEDURE — 85027 COMPLETE CBC AUTOMATED: CPT

## 2020-02-20 PROCEDURE — 2580000003 HC RX 258: Performed by: INTERNAL MEDICINE

## 2020-02-20 PROCEDURE — 6360000002 HC RX W HCPCS: Performed by: INTERNAL MEDICINE

## 2020-02-20 PROCEDURE — 36415 COLL VENOUS BLD VENIPUNCTURE: CPT

## 2020-02-20 RX ORDER — DIPHENHYDRAMINE HYDROCHLORIDE, ZINC ACETATE 2; .1 G/100G; G/100G
CREAM TOPICAL 3 TIMES DAILY PRN
Status: DISCONTINUED | OUTPATIENT
Start: 2020-02-20 | End: 2020-02-20 | Stop reason: HOSPADM

## 2020-02-20 RX ADMIN — Medication 10 ML: at 05:49

## 2020-02-20 RX ADMIN — DIPHENHYDRAMINE HYDROCHLORIDE, ZINC ACETATE: 2; .1 CREAM TOPICAL at 03:56

## 2020-02-20 RX ADMIN — IBUPROFEN 400 MG: 400 TABLET ORAL at 06:49

## 2020-02-20 RX ADMIN — MEROPENEM 500 MG: 500 INJECTION, POWDER, FOR SOLUTION INTRAVENOUS at 05:49

## 2020-02-20 RX ADMIN — OXYCODONE HYDROCHLORIDE AND ACETAMINOPHEN 2 TABLET: 5; 325 TABLET ORAL at 05:48

## 2020-02-20 RX ADMIN — ONDANSETRON 4 MG: 2 INJECTION INTRAMUSCULAR; INTRAVENOUS at 07:48

## 2020-02-20 RX ADMIN — OXYCODONE HYDROCHLORIDE AND ACETAMINOPHEN 2 TABLET: 5; 325 TABLET ORAL at 00:27

## 2020-02-20 RX ADMIN — MEROPENEM 500 MG: 500 INJECTION, POWDER, FOR SOLUTION INTRAVENOUS at 00:27

## 2020-02-20 RX ADMIN — Medication 10 ML: at 06:49

## 2020-02-20 ASSESSMENT — PAIN DESCRIPTION - PAIN TYPE
TYPE: ACUTE PAIN

## 2020-02-20 ASSESSMENT — PAIN SCALES - GENERAL
PAINLEVEL_OUTOF10: 10
PAINLEVEL_OUTOF10: 8
PAINLEVEL_OUTOF10: 9
PAINLEVEL_OUTOF10: 4
PAINLEVEL_OUTOF10: 5

## 2020-02-20 ASSESSMENT — PAIN - FUNCTIONAL ASSESSMENT
PAIN_FUNCTIONAL_ASSESSMENT: ACTIVITIES ARE NOT PREVENTED

## 2020-02-20 ASSESSMENT — PAIN DESCRIPTION - ORIENTATION
ORIENTATION: MID
ORIENTATION: RIGHT;LEFT
ORIENTATION: RIGHT;LEFT

## 2020-02-20 ASSESSMENT — PAIN DESCRIPTION - LOCATION
LOCATION: FLANK
LOCATION: FLANK
LOCATION: HEAD

## 2020-02-20 ASSESSMENT — PAIN DESCRIPTION - ONSET
ONSET: ON-GOING

## 2020-02-20 ASSESSMENT — PAIN DESCRIPTION - PROGRESSION
CLINICAL_PROGRESSION: NOT CHANGED
CLINICAL_PROGRESSION: GRADUALLY IMPROVING
CLINICAL_PROGRESSION: NOT CHANGED

## 2020-02-20 ASSESSMENT — PAIN DESCRIPTION - DIRECTION: RADIATING_TOWARDS: ABD/BACK

## 2020-02-20 ASSESSMENT — PAIN DESCRIPTION - FREQUENCY
FREQUENCY: INTERMITTENT
FREQUENCY: CONTINUOUS
FREQUENCY: INTERMITTENT

## 2020-02-20 ASSESSMENT — PAIN DESCRIPTION - DESCRIPTORS
DESCRIPTORS: ACHING

## 2020-02-20 NOTE — CONSULTS
Consulting Physician: FREDRICK Ortiz    Reason for Consult: urinary retention    History of Present Illness: Bharati Nicole is a 42 y.o.  female with chronic hydrocephalus with  shunt, OA, IBS, nephrolithiasis, previous ESBL producing infection who has been hospitalized many times for flank/abdominal pain, urinary retention and UTI's. She was recently at [facility] ED with urinary retention on 2/12/20 where she had a bladder scan of 550cc and a urethral catheter placed for unknown amount and was to follow up with urology for removal. She then returned on 2/15/20 for abdominal and back pain, UA was negative for infection. She then came to [facility] ED a few days later - sent by PCP for the same complaints. She has drug seeking behavior. She tells me she passes 7 kidney stones a day and when this happens it causes her to go into retention. Asking her what this looks like she says \"sandlike pieces at the bottom of the toilet\" We reviewed her CT scan which shows she has a 3mm right lower pole non-obstructing calculi present, stable from her CT scan back in 10/2019. I have recommended intermittent self-catheterizations in the past as well as today and she refuses to do this, stating \"it's uncomfortable, weird and gross\". She is followed with [facility], Dr. Jayden Lux. She underwent urodynamic testing 12/10/19 which showed normal compliance, normal detrusor function, and complete bladder emptying. There was no incontinence. She did have significant after-contraction on the voiding study which may be suggestive of detrusor instability. She also underwent a cystoscopy the same day with bilateral retrograde pyelogram which showed trabeculation without diverticula, otherwise normal study. She got angry that we were consulted stating \"I have a urologist at [facility], I don't know why you are here\". She would not allow me to perform physical exam. Urethral catheter is intact with orange/clear output.  I did recommend on file    Highest education level: Not on file   Occupational History    Occupation: disability, SSI    Social Needs    Financial resource strain: Not on file    Food insecurity:     Worry: Not on file     Inability: Not on file    Transportation needs:     Medical: Not on file     Non-medical: Not on file   Tobacco Use    Smoking status: Current Every Day Smoker     Packs/day: 0.50     Years: 18.00     Pack years: 9.00     Types: Cigarettes    Smokeless tobacco: Never Used   Substance and Sexual Activity    Alcohol use: No     Alcohol/week: 0.0 standard drinks    Drug use: No    Sexual activity: Not Currently     Partners: Male   Lifestyle    Physical activity:     Days per week: Not on file     Minutes per session: Not on file    Stress: Not on file   Relationships    Social connections:     Talks on phone: Not on file     Gets together: Not on file     Attends Amish service: Not on file     Active member of club or organization: Not on file     Attends meetings of clubs or organizations: Not on file     Relationship status: Not on file    Intimate partner violence:     Fear of current or ex partner: Not on file     Emotionally abused: Not on file     Physically abused: Not on file     Forced sexual activity: Not on file   Other Topics Concern    Not on file   Social History Narrative    Not on file       Family History:  Family History   Problem Relation Age of Onset    High Blood Pressure Mother     Diabetes Mother     High Cholesterol Mother     Depression Mother     Diabetes Maternal Grandmother     High Blood Pressure Maternal Grandmother     High Cholesterol Maternal Grandmother     Heart Disease Maternal Grandfather     High Blood Pressure Maternal Grandfather     Heart Disease Paternal Grandmother     Stroke Paternal Grandfather     Depression Sister     Cirrhosis Father     Rheum Arthritis Neg Hx     Osteoarthritis Neg Hx     Asthma Neg Hx     Breast Cancer Neg Hx

## 2020-02-20 NOTE — PROGRESS NOTES
Patient c/o itching on chest where previous EKG stickers were at. Secure message sent via perfect serve for benadryl order. No needs at this time. Patient resting comfortably in bed. Call light in reach. Will continue to monitor.    Electronically signed by Sharif Scherer RN on 2/20/2020 at 2:34 AM

## 2020-02-20 NOTE — PROGRESS NOTES
Patient is A&O x3.  RA, sat 96%. No complaints of SOB. Medicated for c/o bilateral flank pain as needed. Respirations appear to easy and unlabored. Lungs clear. Respirations easy with no complaints of cough. No complaints of nausea/vomiting/diarrhea. Up ad martha to the bathroom/BSC as needed. Patient will leave floor at times to smoke outside. Tolerating regular diet. Guzman intact with orange sediments urine output. Left AC PIV intact and flushed. Plan of care and safety measures reviewed with the patient. Call light in reach. Will continue to monitor.   Electronically signed by Briana Smart RN on 2/19/2020 at 11:56 PM

## 2020-02-21 ENCOUNTER — TELEPHONE (OUTPATIENT)
Dept: INTERNAL MEDICINE CLINIC | Age: 40
End: 2020-02-21

## 2020-02-21 RX ORDER — ONDANSETRON 4 MG/1
4 TABLET, FILM COATED ORAL EVERY 8 HOURS PRN
Qty: 20 TABLET | Refills: 0 | Status: SHIPPED | OUTPATIENT
Start: 2020-02-21 | End: 2021-06-17

## 2020-02-22 LAB
BLOOD CULTURE, ROUTINE: NORMAL
CULTURE, BLOOD 2: NORMAL

## 2020-02-27 ENCOUNTER — TELEPHONE (OUTPATIENT)
Dept: INTERNAL MEDICINE CLINIC | Age: 40
End: 2020-02-27

## 2020-03-03 ENCOUNTER — TELEPHONE (OUTPATIENT)
Dept: INTERNAL MEDICINE CLINIC | Age: 40
End: 2020-03-03

## 2020-03-03 NOTE — TELEPHONE ENCOUNTER
Pt went to pain mgmt today and was told that they coouldn't do anything for her. She would like Brenna to suggest another pain mgmt doc. Please call patient. She has a catheter that was put in at [facility] ER. She is having some problems with it. She needs advice.

## 2020-03-04 ENCOUNTER — TELEPHONE (OUTPATIENT)
Dept: INTERNAL MEDICINE CLINIC | Age: 40
End: 2020-03-04

## 2020-03-04 ENCOUNTER — HOSPITAL ENCOUNTER (EMERGENCY)
Age: 40
Discharge: HOME | End: 2020-03-04
Attending: EMERGENCY MEDICINE
Payer: COMMERCIAL

## 2020-03-04 VITALS
OXYGEN SATURATION: 99 % | TEMPERATURE: 98.9 F | HEART RATE: 99 BPM | WEIGHT: 148.9 LBS | RESPIRATION RATE: 18 BRPM | BODY MASS INDEX: 30.02 KG/M2 | HEIGHT: 59 IN | DIASTOLIC BLOOD PRESSURE: 89 MMHG | SYSTOLIC BLOOD PRESSURE: 133 MMHG

## 2020-03-04 LAB
ANION GAP SERPL CALCULATED.3IONS-SCNC: 13 MMOL/L (ref 3–16)
BACTERIA: ABNORMAL /HPF
BASOPHILS ABSOLUTE: 0.1 K/UL (ref 0–0.2)
BASOPHILS RELATIVE PERCENT: 1.5 %
BILIRUBIN URINE: NEGATIVE
BLOOD, URINE: ABNORMAL
BUN BLDV-MCNC: 9 MG/DL (ref 7–20)
CALCIUM SERPL-MCNC: 9.1 MG/DL (ref 8.3–10.6)
CHLORIDE BLD-SCNC: 102 MMOL/L (ref 99–110)
CLARITY: ABNORMAL
CO2: 25 MMOL/L (ref 21–32)
COLOR: YELLOW
CREAT SERPL-MCNC: 0.7 MG/DL (ref 0.6–1.1)
EOSINOPHILS ABSOLUTE: 0.2 K/UL (ref 0–0.6)
EOSINOPHILS RELATIVE PERCENT: 1.7 %
EPITHELIAL CELLS, UA: 1 /HPF (ref 0–5)
GFR AFRICAN AMERICAN: >60
GFR NON-AFRICAN AMERICAN: >60
GLUCOSE BLD-MCNC: 126 MG/DL (ref 70–99)
GLUCOSE URINE: NEGATIVE MG/DL
HCT VFR BLD CALC: 41.7 % (ref 36–48)
HEMOGLOBIN: 14.5 G/DL (ref 12–16)
HYALINE CASTS: 6 /LPF (ref 0–8)
KETONES, URINE: NEGATIVE MG/DL
LACTIC ACID: 1.5 MMOL/L (ref 0.4–2)
LEUKOCYTE ESTERASE, URINE: ABNORMAL
LYMPHOCYTES ABSOLUTE: 3.2 K/UL (ref 1–5.1)
LYMPHOCYTES RELATIVE PERCENT: 32.8 %
MCH RBC QN AUTO: 31.2 PG (ref 26–34)
MCHC RBC AUTO-ENTMCNC: 34.9 G/DL (ref 31–36)
MCV RBC AUTO: 89.5 FL (ref 80–100)
MICROSCOPIC EXAMINATION: YES
MONOCYTES ABSOLUTE: 0.4 K/UL (ref 0–1.3)
MONOCYTES RELATIVE PERCENT: 4.5 %
NEUTROPHILS ABSOLUTE: 5.8 K/UL (ref 1.7–7.7)
NEUTROPHILS RELATIVE PERCENT: 59.5 %
NITRITE, URINE: POSITIVE
PDW BLD-RTO: 13.4 % (ref 12.4–15.4)
PH UA: 7.5 (ref 5–8)
PLATELET # BLD: 277 K/UL (ref 135–450)
PMV BLD AUTO: 7.8 FL (ref 5–10.5)
POTASSIUM REFLEX MAGNESIUM: 3.7 MMOL/L (ref 3.5–5.1)
PROTEIN UA: 30 MG/DL
RBC # BLD: 4.66 M/UL (ref 4–5.2)
RBC UA: 21 /HPF (ref 0–4)
SODIUM BLD-SCNC: 140 MMOL/L (ref 136–145)
SPECIFIC GRAVITY UA: 1.02 (ref 1–1.03)
URINE REFLEX TO CULTURE: YES
URINE TYPE: ABNORMAL
UROBILINOGEN, URINE: 1 E.U./DL
WBC # BLD: 9.8 K/UL (ref 4–11)
WBC UA: 51 /HPF (ref 0–5)

## 2020-03-04 PROCEDURE — 80048 BASIC METABOLIC PNL TOTAL CA: CPT

## 2020-03-04 PROCEDURE — 87086 URINE CULTURE/COLONY COUNT: CPT

## 2020-03-04 PROCEDURE — 83605 ASSAY OF LACTIC ACID: CPT

## 2020-03-04 PROCEDURE — 85025 COMPLETE CBC W/AUTO DIFF WBC: CPT

## 2020-03-04 PROCEDURE — 99284 EMERGENCY DEPT VISIT MOD MDM: CPT

## 2020-03-04 PROCEDURE — 6370000000 HC RX 637 (ALT 250 FOR IP): Performed by: EMERGENCY MEDICINE

## 2020-03-04 PROCEDURE — 81001 URINALYSIS AUTO W/SCOPE: CPT

## 2020-03-04 PROCEDURE — 87040 BLOOD CULTURE FOR BACTERIA: CPT

## 2020-03-04 RX ORDER — OXYCODONE HYDROCHLORIDE AND ACETAMINOPHEN 5; 325 MG/1; MG/1
2 TABLET ORAL ONCE
Status: COMPLETED | OUTPATIENT
Start: 2020-03-04 | End: 2020-03-04

## 2020-03-04 RX ORDER — PHENAZOPYRIDINE HYDROCHLORIDE 100 MG/1
100 TABLET, FILM COATED ORAL 3 TIMES DAILY PRN
Qty: 6 TABLET | Refills: 0 | Status: SHIPPED | OUTPATIENT
Start: 2020-03-04 | End: 2020-03-07

## 2020-03-04 RX ADMIN — OXYCODONE HYDROCHLORIDE AND ACETAMINOPHEN 2 TABLET: 5; 325 TABLET ORAL at 15:49

## 2020-03-04 ASSESSMENT — PAIN DESCRIPTION - ONSET: ONSET: ON-GOING

## 2020-03-04 ASSESSMENT — PAIN - FUNCTIONAL ASSESSMENT
PAIN_FUNCTIONAL_ASSESSMENT: PREVENTS OR INTERFERES SOME ACTIVE ACTIVITIES AND ADLS
PAIN_FUNCTIONAL_ASSESSMENT: 0-10

## 2020-03-04 ASSESSMENT — PAIN DESCRIPTION - PAIN TYPE: TYPE: ACUTE PAIN

## 2020-03-04 ASSESSMENT — PAIN SCALES - GENERAL
PAINLEVEL_OUTOF10: 7
PAINLEVEL_OUTOF10: 6
PAINLEVEL_OUTOF10: 8

## 2020-03-04 ASSESSMENT — PAIN DESCRIPTION - FREQUENCY: FREQUENCY: CONTINUOUS

## 2020-03-04 ASSESSMENT — PAIN DESCRIPTION - PROGRESSION: CLINICAL_PROGRESSION: NOT CHANGED

## 2020-03-04 ASSESSMENT — PAIN DESCRIPTION - DESCRIPTORS: DESCRIPTORS: ACHING

## 2020-03-04 ASSESSMENT — PAIN DESCRIPTION - ORIENTATION: ORIENTATION: LEFT;RIGHT

## 2020-03-04 ASSESSMENT — PAIN DESCRIPTION - LOCATION: LOCATION: BACK

## 2020-03-04 NOTE — ED PROVIDER NOTES
TABLET    Take 1 tablet by mouth every 8 hours as needed for Nausea    TRAMADOL (ULTRAM) 50 MG TABLET    Take  mg by mouth every 6 hours as needed. ALLERGIES     Bentyl [dicyclomine hcl]; Mushroom extract complex; Sulfa antibiotics; Vancomycin; Adhesive tape; Bee venom; Hydrocodone; Levaquin [levofloxacin]; Toradol [ketorolac tromethamine]; Ceftaroline; Daptomycin; Dicyclomine; Fioricet-codeine [butalbital-apap-caff-cod];  Prochlorperazine; and Zosyn [piperacillin sod-tazobactam so]    FAMILY HISTORY       Family History   Problem Relation Age of Onset    High Blood Pressure Mother     Diabetes Mother     High Cholesterol Mother     Depression Mother     Diabetes Maternal Grandmother     High Blood Pressure Maternal Grandmother     High Cholesterol Maternal Grandmother     Heart Disease Maternal Grandfather     High Blood Pressure Maternal Grandfather     Heart Disease Paternal Grandmother     Stroke Paternal Grandfather     Depression Sister     Cirrhosis Father     Rheum Arthritis Neg Hx     Osteoarthritis Neg Hx     Asthma Neg Hx     Breast Cancer Neg Hx     Cancer Neg Hx     Heart Failure Neg Hx     Hypertension Neg Hx     Migraines Neg Hx     Ovarian Cancer Neg Hx     Rashes/Skin Problems Neg Hx     Seizures Neg Hx     Thyroid Disease Neg Hx           SOCIAL HISTORY       Social History     Socioeconomic History    Marital status: Single     Spouse name: Not on file    Number of children: Not on file    Years of education: Not on file    Highest education level: Not on file   Occupational History    Occupation: disability, SSI    Social Needs    Financial resource strain: Not on file    Food insecurity:     Worry: Not on file     Inability: Not on file    Transportation needs:     Medical: Not on file     Non-medical: Not on file   Tobacco Use    Smoking status: Current Every Day Smoker     Packs/day: 0.50     Years: 18.00     Pack years: 9.00     Types: Cigarettes  Smokeless tobacco: Never Used   Substance and Sexual Activity    Alcohol use: No     Alcohol/week: 0.0 standard drinks    Drug use: No    Sexual activity: Not Currently     Partners: Male   Lifestyle    Physical activity:     Days per week: Not on file     Minutes per session: Not on file    Stress: Not on file   Relationships    Social connections:     Talks on phone: Not on file     Gets together: Not on file     Attends Scientology service: Not on file     Active member of club or organization: Not on file     Attends meetings of clubs or organizations: Not on file     Relationship status: Not on file    Intimate partner violence:     Fear of current or ex partner: Not on file     Emotionally abused: Not on file     Physically abused: Not on file     Forced sexual activity: Not on file   Other Topics Concern    Not on file   Social History Narrative    Not on file         PHYSICAL EXAM    (up to 7 for level 4, 8 or more for level 5)     ED Triage Vitals [03/04/20 1352]   BP Temp Temp Source Pulse Resp SpO2 Height Weight   133/89 98.9 °F (37.2 °C) Oral 99 18 99 % 4' 11\" (1.499 m) 148 lb 14.4 oz (67.5 kg)       General: Alert female no acute distress. Head: Atraumatic and normocephalic. Eyes: No conjunctival injection. Pupils equal round reactive. ENT: Ade Mohan is clear. Oropharynx is moist without erythema. Neck: Supple without adenopathy, nontender. Heart: Regular rate and rhythm. No murmurs or gallops noted. Lungs: Breath sounds equal bilaterally and clear. Abdomen: Soft, nondistended, minimal suprapubic pubic tenderness without guarding or rebound. No masses organomegaly. Mild bilateral flank tenderness. Musculoskeletal: No lower extremity edema. No calf tenderness. Intact symmetrical distal pulses. Skin: Warm and dry, good turgor. No pallor or cyanosis. No diaphoresis. Neuro: Awake, alert, oriented. No focal motor deficits.       DIFFERENTIAL DIAGNOSIS   Differential includes but is return if she has worsening of her symptoms or new symptoms develop. This results, diagnosis, and treatment plan were discussed with the patient. She understands the treatment plan and follow-up as discussed. CONSULTS:  None    PROCEDURES:  None    FINAL IMPRESSION      1. Acute bilateral low back pain without sciatica    2.  Guzman catheter in place          DISPOSITION/PLAN   DISPOSITION Decision To Discharge 03/04/2020 05:11:06 PM      PATIENT REFERRED TO:  Urology  As scheduled on 3/9/20  In 3 days  If symptoms worsen    Thiago Bach [facility]  [address]  [phone]            DISCHARGE MEDICATIONS:  New Prescriptions    PHENAZOPYRIDINE (PYRIDIUM) 100 MG TABLET    Take 1 tablet by mouth 3 times daily as needed for Pain       (Please note that portions of this note were completed with a voice recognition program.  Efforts were made to edit the dictations but occasionally words are mis-transcribed.)    Fernando Smith MD  Attending Emergency Physician        Alexia Lal MD  03/04/20 [id number]

## 2020-03-04 NOTE — ED NOTES
D/C: Order noted for d/c. Pt confirmed d/c paperwork and one RX have correct name. Discharge and education instructions reviewed with patient. Teach-back successful. Pt verbalized understanding and signed d/c papers. Pt denied questions at this time. No acute distress noted. Patient instructed to follow-up as noted - return to emergency department if symptoms worsen. Patient verbalized understanding. Discharged per EDMD with discharge instructions. Pt discharged to private vehicle with friend. Patient instructed not to drive for four hours post Percocet administration. Patient stable upon departure. Thanked patient for choosing [facility]) for care. Provider aware of patient pain at time of discharge.        Jean-Claude Nowak, RN  03/04/20 [address] [facility] Titi Levi, SHANA  03/04/20 3111

## 2020-03-04 NOTE — TELEPHONE ENCOUNTER
Pt thinks she has sepsic again. Temp 102. Can't even get up off the couch. Walked to bathroom and almost passed out. Wants to speak to Florence or MA.

## 2020-03-05 LAB — URINE CULTURE, ROUTINE: NORMAL

## 2020-03-06 ENCOUNTER — APPOINTMENT (OUTPATIENT)
Dept: CT IMAGING | Age: 40
End: 2020-03-06
Payer: COMMERCIAL

## 2020-03-06 ENCOUNTER — HOSPITAL ENCOUNTER (EMERGENCY)
Age: 40
Discharge: HOME | End: 2020-03-06
Attending: EMERGENCY MEDICINE
Payer: COMMERCIAL

## 2020-03-06 VITALS
TEMPERATURE: 98.8 F | DIASTOLIC BLOOD PRESSURE: 83 MMHG | WEIGHT: 150 LBS | HEART RATE: 88 BPM | BODY MASS INDEX: 30.24 KG/M2 | RESPIRATION RATE: 16 BRPM | HEIGHT: 59 IN | OXYGEN SATURATION: 97 % | SYSTOLIC BLOOD PRESSURE: 126 MMHG

## 2020-03-06 LAB
A/G RATIO: 1.1 (ref 1.1–2.2)
ALBUMIN SERPL-MCNC: 4.5 G/DL (ref 3.4–5)
ALP BLD-CCNC: 93 U/L (ref 40–129)
ALT SERPL-CCNC: 22 U/L (ref 10–40)
ANION GAP SERPL CALCULATED.3IONS-SCNC: 17 MMOL/L (ref 3–16)
AST SERPL-CCNC: 47 U/L (ref 15–37)
BACTERIA: ABNORMAL /HPF
BASOPHILS ABSOLUTE: 0.1 K/UL (ref 0–0.2)
BASOPHILS RELATIVE PERCENT: 0.5 %
BILIRUB SERPL-MCNC: 0.5 MG/DL (ref 0–1)
BILIRUBIN URINE: NEGATIVE
BLOOD, URINE: ABNORMAL
BUN BLDV-MCNC: 10 MG/DL (ref 7–20)
CALCIUM SERPL-MCNC: 10.1 MG/DL (ref 8.3–10.6)
CHLORIDE BLD-SCNC: 101 MMOL/L (ref 99–110)
CLARITY: CLEAR
CO2: 20 MMOL/L (ref 21–32)
COLOR: YELLOW
CREAT SERPL-MCNC: 0.6 MG/DL (ref 0.6–1.1)
EOSINOPHILS ABSOLUTE: 0.1 K/UL (ref 0–0.6)
EOSINOPHILS RELATIVE PERCENT: 0.8 %
EPITHELIAL CELLS, UA: ABNORMAL /HPF (ref 0–5)
GFR AFRICAN AMERICAN: >60
GFR NON-AFRICAN AMERICAN: >60
GLOBULIN: 4.2 G/DL
GLUCOSE BLD-MCNC: 117 MG/DL (ref 70–99)
GLUCOSE URINE: NEGATIVE MG/DL
HCT VFR BLD CALC: 46.8 % (ref 36–48)
HEMOGLOBIN: 16.1 G/DL (ref 12–16)
KETONES, URINE: NEGATIVE MG/DL
LACTIC ACID: 1.3 MMOL/L (ref 0.4–2)
LEUKOCYTE ESTERASE, URINE: ABNORMAL
LYMPHOCYTES ABSOLUTE: 3.1 K/UL (ref 1–5.1)
LYMPHOCYTES RELATIVE PERCENT: 20.5 %
MCH RBC QN AUTO: 31.1 PG (ref 26–34)
MCHC RBC AUTO-ENTMCNC: 34.3 G/DL (ref 31–36)
MCV RBC AUTO: 90.4 FL (ref 80–100)
MICROSCOPIC EXAMINATION: YES
MONOCYTES ABSOLUTE: 0.5 K/UL (ref 0–1.3)
MONOCYTES RELATIVE PERCENT: 3.1 %
NEUTROPHILS ABSOLUTE: 11.4 K/UL (ref 1.7–7.7)
NEUTROPHILS RELATIVE PERCENT: 75.1 %
NITRITE, URINE: POSITIVE
PDW BLD-RTO: 13.7 % (ref 12.4–15.4)
PH UA: 7 (ref 5–8)
PLATELET # BLD: 345 K/UL (ref 135–450)
PMV BLD AUTO: 8.7 FL (ref 5–10.5)
POTASSIUM SERPL-SCNC: 5.2 MMOL/L (ref 3.5–5.1)
PROTEIN UA: NEGATIVE MG/DL
RBC # BLD: 5.18 M/UL (ref 4–5.2)
RBC UA: ABNORMAL /HPF (ref 0–4)
SODIUM BLD-SCNC: 138 MMOL/L (ref 136–145)
SPECIFIC GRAVITY UA: 1.01 (ref 1–1.03)
TOTAL PROTEIN: 8.7 G/DL (ref 6.4–8.2)
URINE REFLEX TO CULTURE: YES
URINE TYPE: ABNORMAL
UROBILINOGEN, URINE: 0.2 E.U./DL
WBC # BLD: 15.1 K/UL (ref 4–11)
WBC UA: ABNORMAL /HPF (ref 0–5)

## 2020-03-06 PROCEDURE — 81001 URINALYSIS AUTO W/SCOPE: CPT

## 2020-03-06 PROCEDURE — 87086 URINE CULTURE/COLONY COUNT: CPT

## 2020-03-06 PROCEDURE — 6360000004 HC RX CONTRAST MEDICATION: Performed by: EMERGENCY MEDICINE

## 2020-03-06 PROCEDURE — 87040 BLOOD CULTURE FOR BACTERIA: CPT

## 2020-03-06 PROCEDURE — 96375 TX/PRO/DX INJ NEW DRUG ADDON: CPT

## 2020-03-06 PROCEDURE — 99285 EMERGENCY DEPT VISIT HI MDM: CPT

## 2020-03-06 PROCEDURE — 36415 COLL VENOUS BLD VENIPUNCTURE: CPT

## 2020-03-06 PROCEDURE — 80053 COMPREHEN METABOLIC PANEL: CPT

## 2020-03-06 PROCEDURE — 83605 ASSAY OF LACTIC ACID: CPT

## 2020-03-06 PROCEDURE — 85025 COMPLETE CBC W/AUTO DIFF WBC: CPT

## 2020-03-06 PROCEDURE — 6360000002 HC RX W HCPCS: Performed by: EMERGENCY MEDICINE

## 2020-03-06 PROCEDURE — 6370000000 HC RX 637 (ALT 250 FOR IP): Performed by: EMERGENCY MEDICINE

## 2020-03-06 PROCEDURE — 96374 THER/PROPH/DIAG INJ IV PUSH: CPT

## 2020-03-06 PROCEDURE — 96376 TX/PRO/DX INJ SAME DRUG ADON: CPT

## 2020-03-06 PROCEDURE — 74177 CT ABD & PELVIS W/CONTRAST: CPT

## 2020-03-06 RX ORDER — KETAMINE HCL IN NACL, ISO-OSM 100MG/10ML
0.2 SYRINGE (ML) INJECTION ONCE
Status: DISCONTINUED | OUTPATIENT
Start: 2020-03-06 | End: 2020-03-07 | Stop reason: HOSPADM

## 2020-03-06 RX ORDER — NITROFURANTOIN 25; 75 MG/1; MG/1
100 CAPSULE ORAL 2 TIMES DAILY
Qty: 20 CAPSULE | Refills: 0 | Status: SHIPPED | OUTPATIENT
Start: 2020-03-06 | End: 2020-03-16

## 2020-03-06 RX ORDER — ONDANSETRON 2 MG/ML
4 INJECTION INTRAMUSCULAR; INTRAVENOUS ONCE
Status: COMPLETED | OUTPATIENT
Start: 2020-03-06 | End: 2020-03-06

## 2020-03-06 RX ORDER — NITROFURANTOIN 25; 75 MG/1; MG/1
100 CAPSULE ORAL ONCE
Status: COMPLETED | OUTPATIENT
Start: 2020-03-06 | End: 2020-03-06

## 2020-03-06 RX ORDER — PROMETHAZINE HYDROCHLORIDE 25 MG/ML
25 INJECTION, SOLUTION INTRAMUSCULAR; INTRAVENOUS ONCE
Status: COMPLETED | OUTPATIENT
Start: 2020-03-06 | End: 2020-03-06

## 2020-03-06 RX ORDER — HYDROCODONE BITARTRATE AND ACETAMINOPHEN 5; 325 MG/1; MG/1
1 TABLET ORAL ONCE
Status: COMPLETED | OUTPATIENT
Start: 2020-03-06 | End: 2020-03-06

## 2020-03-06 RX ORDER — PROMETHAZINE HYDROCHLORIDE 25 MG/1
25 TABLET ORAL EVERY 6 HOURS PRN
Qty: 20 TABLET | Refills: 0 | Status: SHIPPED | OUTPATIENT
Start: 2020-03-06 | End: 2020-03-13

## 2020-03-06 RX ADMIN — IOVERSOL 100 ML: 678 INJECTION INTRA-ARTERIAL; INTRAVENOUS at 20:53

## 2020-03-06 RX ADMIN — HYDROCODONE BITARTRATE AND ACETAMINOPHEN 1 TABLET: 5; 325 TABLET ORAL at 22:29

## 2020-03-06 RX ADMIN — PROMETHAZINE HYDROCHLORIDE 25 MG: 25 INJECTION INTRAMUSCULAR; INTRAVENOUS at 22:29

## 2020-03-06 RX ADMIN — ONDANSETRON 4 MG: 2 INJECTION INTRAMUSCULAR; INTRAVENOUS at 20:02

## 2020-03-06 RX ADMIN — NITROFURANTOIN MONOHYDRATE/MACROCRYSTALLINE 100 MG: 25; 75 CAPSULE ORAL at 22:29

## 2020-03-06 RX ADMIN — ONDANSETRON 4 MG: 2 INJECTION INTRAMUSCULAR; INTRAVENOUS at 21:30

## 2020-03-06 ASSESSMENT — PAIN DESCRIPTION - LOCATION: LOCATION: ABDOMEN;FLANK

## 2020-03-06 ASSESSMENT — ENCOUNTER SYMPTOMS
EYE REDNESS: 0
SHORTNESS OF BREATH: 0
RHINORRHEA: 0
ABDOMINAL PAIN: 1
SORE THROAT: 0

## 2020-03-06 ASSESSMENT — PAIN - FUNCTIONAL ASSESSMENT: PAIN_FUNCTIONAL_ASSESSMENT: 0-10

## 2020-03-06 ASSESSMENT — PAIN SCALES - GENERAL
PAINLEVEL_OUTOF10: 8

## 2020-03-06 ASSESSMENT — PAIN DESCRIPTION - PAIN TYPE: TYPE: ACUTE PAIN

## 2020-03-06 NOTE — ED PROVIDER NOTES
Osteoarthritis Neg Hx     Asthma Neg Hx     Breast Cancer Neg Hx     Cancer Neg Hx     Heart Failure Neg Hx     Hypertension Neg Hx     Migraines Neg Hx     Ovarian Cancer Neg Hx     Rashes/Skin Problems Neg Hx     Seizures Neg Hx     Thyroid Disease Neg Hx           SOCIAL HISTORY       Social History     Socioeconomic History    Marital status: Single     Spouse name: None    Number of children: None    Years of education: None    Highest education level: None   Occupational History    Occupation: disability, SSI    Social Needs    Financial resource strain: None    Food insecurity     Worry: None     Inability: None    Transportation needs     Medical: None     Non-medical: None   Tobacco Use    Smoking status: Current Every Day Smoker     Packs/day: 0.50     Years: 18.00     Pack years: 9.00     Types: Cigarettes    Smokeless tobacco: Never Used   Substance and Sexual Activity    Alcohol use: No     Alcohol/week: 0.0 standard drinks    Drug use: No    Sexual activity: Not Currently     Partners: Male   Lifestyle    Physical activity     Days per week: None     Minutes per session: None    Stress: None   Relationships    Social connections     Talks on phone: None     Gets together: None     Attends [employer] service: None     Active member of club or organization: None     Attends meetings of clubs or organizations: None     Relationship status: None    Intimate partner violence     Fear of current or ex partner: None     Emotionally abused: None     Physically abused: None     Forced sexual activity: None   Other Topics Concern    None   Social History Narrative    None       SCREENINGS             PHYSICAL EXAM    (up to 7 for level 4, 8 or more for level 5)     ED Triage Vitals [03/06/20 1805]   BP Temp Temp Source Pulse Resp SpO2 Height Weight   (!) 130/94 98.8 °F (37.1 °C) Oral 118 19 97 % 4' 11\" (1.499 m) 150 lb (68 kg)       Physical Exam  Vitals signs and nursing note urinalysis is recommended. ED BEDSIDE ULTRASOUND:   Performed by ED Physician - none    LABS:  Labs Reviewed   CBC WITH AUTO DIFFERENTIAL - Abnormal; Notable for the following components:       Result Value    WBC 15.1 (*)     Hemoglobin 16.1 (*)     Neutrophils Absolute 11.4 (*)     All other components within normal limits    Narrative:     Performed at:  [facility]  [address] [place], [place] [facility]   Phone [phone]   COMPREHENSIVE METABOLIC PANEL - Abnormal; Notable for the following components:    Potassium 5.2 (*)     CO2 20 (*)     Anion Gap 17 (*)     Glucose 117 (*)     Total Protein 8.7 (*)     AST 47 (*)     All other components within normal limits    Narrative:     Performed at:  [facility]  [address] [place], [place] [facility]   Phone [phone]   URINE RT REFLEX TO CULTURE - Abnormal; Notable for the following components:    Blood, Urine MODERATE (*)     Nitrite, Urine POSITIVE (*)     Leukocyte Esterase, Urine SMALL (*)     All other components within normal limits    Narrative:     Performed at:  [facility]  [address] [place], [place] [facility]   Phone [phone]   MICROSCOPIC URINALYSIS - Abnormal; Notable for the following components:    RBC, UA 5-10 (*)     Bacteria, UA 3+ (*)     All other components within normal limits    Narrative:     Performed at:  [facility]  [address] [place], [place] [facility]   Phone [phone]   CULTURE, BLOOD 1    Narrative:     ORDER#: [id number]                          ORDERED BY: Shaw Jeffries  SOURCE: Blood                              COLLECTED:  03/06/20 19:25  ANTIBIOTICS AT ANTELMO.:                      RECEIVED :  03/07/20 02:53  If child <=2 yrs old please draw pediatric bottle. ~Blood Culture #1  Performed at:  [facility] -

## 2020-03-07 NOTE — ED PROVIDER NOTES
[place] of Care Note:    I assumed care of this patient at 200 from Dr. Brianna Bush, please see Dr Massey Height note for more detail. Briefly, this pt is a 51-year-old white female with a recent history of urinary retention and placement of urinary catheter and was admitted for urosepsis several weeks ago at [facility].  Patient had a urine culture performed 2 days ago that did not grow out any organisms but returns today complaining of nausea and vomiting as well as \"not feeling well\". Patient feels that she may have a recurrent urinary tract infection and her last catheter was replaced 2 weeks ago. Patient denies vaginal discharge. No fevers or chills. No diarrhea. No abdominal pain. No chest pain or shortness of breath. Laboratory studies were reviewed and the patient has a normal lactate. Patient does have a mildly elevated white blood cell count and a urinalysis that does have a small number of leukocytes. The rest of the patient's blood work and electrolytes are normal and patient's CAT scan of the abdomen and pelvis with IV contrast reveals no evidence of pyelonephritis or acute intra-abdominal process. ER course: Patient was offered IV fluids, IV antibiotics which she declined. Patient reported that she only wanted oral Phenergan and oral antibiotics and wanted to be discharged home. Shared decision-making was used for expectant management as an outpatient with close follow-up. Patient was advised to return to the emergency room for worsening symptoms. Patient does not have signs of sepsis and although she does have a mild sinus tachycardia I do believe this is likely secondary to mild dehydration as the patient's abdominal exam was benign. Patient has no chest pain or shortness of breath. Medical decision making: Macrobid and Phenergan x7 days for home. Push fluids. Return to the emergency room for worsening symptoms.   Patient symptomatology is likely secondary to catheter

## 2020-03-08 ENCOUNTER — HOSPITAL ENCOUNTER (EMERGENCY)
Age: 40
Discharge: ANOTHER HOSPITAL | End: 2020-03-09
Attending: EMERGENCY MEDICINE
Payer: COMMERCIAL

## 2020-03-08 LAB
ALBUMIN SERPL-MCNC: 4.1 G/DL (ref 3.4–5)
ALP BLD-CCNC: 86 U/L (ref 40–129)
ALT SERPL-CCNC: 15 U/L (ref 10–40)
ANION GAP SERPL CALCULATED.3IONS-SCNC: 18 MMOL/L (ref 3–16)
AST SERPL-CCNC: 13 U/L (ref 15–37)
BACTERIA: ABNORMAL /HPF
BASOPHILS ABSOLUTE: 0.1 K/UL (ref 0–0.2)
BASOPHILS RELATIVE PERCENT: 0.9 %
BILIRUB SERPL-MCNC: <0.2 MG/DL (ref 0–1)
BILIRUBIN DIRECT: <0.2 MG/DL (ref 0–0.3)
BILIRUBIN URINE: NEGATIVE
BILIRUBIN, INDIRECT: ABNORMAL MG/DL (ref 0–1)
BLOOD CULTURE, ROUTINE: NORMAL
BLOOD, URINE: NEGATIVE
BUN BLDV-MCNC: 10 MG/DL (ref 7–20)
CALCIUM SERPL-MCNC: 9.7 MG/DL (ref 8.3–10.6)
CHLORIDE BLD-SCNC: 102 MMOL/L (ref 99–110)
CLARITY: CLEAR
CO2: 21 MMOL/L (ref 21–32)
COLOR: YELLOW
CREAT SERPL-MCNC: 0.7 MG/DL (ref 0.6–1.1)
CULTURE, BLOOD 2: NORMAL
EOSINOPHILS ABSOLUTE: 0.2 K/UL (ref 0–0.6)
EOSINOPHILS RELATIVE PERCENT: 1.9 %
EPITHELIAL CELLS, UA: 1 /HPF (ref 0–5)
GFR AFRICAN AMERICAN: >60
GFR NON-AFRICAN AMERICAN: >60
GLUCOSE BLD-MCNC: 137 MG/DL (ref 70–99)
GLUCOSE URINE: NEGATIVE MG/DL
HCT VFR BLD CALC: 42.5 % (ref 36–48)
HEMOGLOBIN: 14.9 G/DL (ref 12–16)
HYALINE CASTS: 6 /LPF (ref 0–8)
KETONES, URINE: NEGATIVE MG/DL
LACTIC ACID: 1.5 MMOL/L (ref 0.4–2)
LACTIC ACID: 2.4 MMOL/L (ref 0.4–2)
LEUKOCYTE ESTERASE, URINE: ABNORMAL
LYMPHOCYTES ABSOLUTE: 3.7 K/UL (ref 1–5.1)
LYMPHOCYTES RELATIVE PERCENT: 39.9 %
MCH RBC QN AUTO: 31.2 PG (ref 26–34)
MCHC RBC AUTO-ENTMCNC: 35.1 G/DL (ref 31–36)
MCV RBC AUTO: 88.9 FL (ref 80–100)
MICROSCOPIC EXAMINATION: YES
MONOCYTES ABSOLUTE: 0.5 K/UL (ref 0–1.3)
MONOCYTES RELATIVE PERCENT: 5.1 %
NEUTROPHILS ABSOLUTE: 4.8 K/UL (ref 1.7–7.7)
NEUTROPHILS RELATIVE PERCENT: 52.2 %
NITRITE, URINE: NEGATIVE
PDW BLD-RTO: 13.4 % (ref 12.4–15.4)
PH UA: 6 (ref 5–8)
PLATELET # BLD: 277 K/UL (ref 135–450)
PMV BLD AUTO: 7.5 FL (ref 5–10.5)
POTASSIUM SERPL-SCNC: 3.6 MMOL/L (ref 3.5–5.1)
PROTEIN UA: NEGATIVE MG/DL
RAPID INFLUENZA  B AGN: NEGATIVE
RAPID INFLUENZA A AGN: NEGATIVE
RBC # BLD: 4.77 M/UL (ref 4–5.2)
RBC UA: 3 /HPF (ref 0–4)
SODIUM BLD-SCNC: 141 MMOL/L (ref 136–145)
SPECIFIC GRAVITY UA: 1.01 (ref 1–1.03)
TOTAL PROTEIN: 6.9 G/DL (ref 6.4–8.2)
URINE CULTURE, ROUTINE: NORMAL
URINE REFLEX TO CULTURE: YES
URINE TYPE: ABNORMAL
UROBILINOGEN, URINE: 0.2 E.U./DL
WBC # BLD: 9.3 K/UL (ref 4–11)
WBC UA: 50 /HPF (ref 0–5)

## 2020-03-08 PROCEDURE — 99285 EMERGENCY DEPT VISIT HI MDM: CPT

## 2020-03-08 PROCEDURE — 83605 ASSAY OF LACTIC ACID: CPT

## 2020-03-08 PROCEDURE — 80048 BASIC METABOLIC PNL TOTAL CA: CPT

## 2020-03-08 PROCEDURE — 6360000002 HC RX W HCPCS: Performed by: NURSE PRACTITIONER

## 2020-03-08 PROCEDURE — 36415 COLL VENOUS BLD VENIPUNCTURE: CPT

## 2020-03-08 PROCEDURE — 87040 BLOOD CULTURE FOR BACTERIA: CPT

## 2020-03-08 PROCEDURE — 87804 INFLUENZA ASSAY W/OPTIC: CPT

## 2020-03-08 PROCEDURE — 2580000003 HC RX 258: Performed by: NURSE PRACTITIONER

## 2020-03-08 PROCEDURE — 85025 COMPLETE CBC W/AUTO DIFF WBC: CPT

## 2020-03-08 PROCEDURE — 6370000000 HC RX 637 (ALT 250 FOR IP): Performed by: NURSE PRACTITIONER

## 2020-03-08 PROCEDURE — 81001 URINALYSIS AUTO W/SCOPE: CPT

## 2020-03-08 PROCEDURE — 87186 SC STD MICRODIL/AGAR DIL: CPT

## 2020-03-08 PROCEDURE — 80076 HEPATIC FUNCTION PANEL: CPT

## 2020-03-08 PROCEDURE — 87077 CULTURE AEROBIC IDENTIFY: CPT

## 2020-03-08 PROCEDURE — 87086 URINE CULTURE/COLONY COUNT: CPT

## 2020-03-08 RX ORDER — DIPHENHYDRAMINE HYDROCHLORIDE 50 MG/ML
25 INJECTION INTRAMUSCULAR; INTRAVENOUS ONCE
Status: COMPLETED | OUTPATIENT
Start: 2020-03-08 | End: 2020-03-08

## 2020-03-08 RX ORDER — 0.9 % SODIUM CHLORIDE 0.9 %
30 INTRAVENOUS SOLUTION INTRAVENOUS ONCE
Status: COMPLETED | OUTPATIENT
Start: 2020-03-08 | End: 2020-03-08

## 2020-03-08 RX ORDER — ONDANSETRON 2 MG/ML
4 INJECTION INTRAMUSCULAR; INTRAVENOUS ONCE
Status: COMPLETED | OUTPATIENT
Start: 2020-03-08 | End: 2020-03-08

## 2020-03-08 RX ORDER — MORPHINE SULFATE 4 MG/ML
4 INJECTION, SOLUTION INTRAMUSCULAR; INTRAVENOUS ONCE
Status: COMPLETED | OUTPATIENT
Start: 2020-03-08 | End: 2020-03-08

## 2020-03-08 RX ORDER — TAMSULOSIN HYDROCHLORIDE 0.4 MG/1
0.4 CAPSULE ORAL ONCE
Status: COMPLETED | OUTPATIENT
Start: 2020-03-08 | End: 2020-03-08

## 2020-03-08 RX ORDER — ACETAMINOPHEN 500 MG
1000 TABLET ORAL ONCE
Status: COMPLETED | OUTPATIENT
Start: 2020-03-08 | End: 2020-03-08

## 2020-03-08 RX ORDER — PHENAZOPYRIDINE HYDROCHLORIDE 100 MG/1
200 TABLET, FILM COATED ORAL ONCE
Status: COMPLETED | OUTPATIENT
Start: 2020-03-08 | End: 2020-03-08

## 2020-03-08 RX ORDER — OXYCODONE HYDROCHLORIDE AND ACETAMINOPHEN 5; 325 MG/1; MG/1
1 TABLET ORAL ONCE
Status: COMPLETED | OUTPATIENT
Start: 2020-03-08 | End: 2020-03-08

## 2020-03-08 RX ORDER — KETOROLAC TROMETHAMINE 30 MG/ML
30 INJECTION, SOLUTION INTRAMUSCULAR; INTRAVENOUS ONCE
Status: COMPLETED | OUTPATIENT
Start: 2020-03-08 | End: 2020-03-08

## 2020-03-08 RX ADMIN — SODIUM CHLORIDE 2250 ML: 9 INJECTION, SOLUTION INTRAVENOUS at 18:29

## 2020-03-08 RX ADMIN — OXYCODONE HYDROCHLORIDE AND ACETAMINOPHEN 1 TABLET: 5; 325 TABLET ORAL at 23:27

## 2020-03-08 RX ADMIN — ACETAMINOPHEN 1000 MG: 500 TABLET ORAL at 18:49

## 2020-03-08 RX ADMIN — MEROPENEM 1 G: 1 INJECTION, POWDER, FOR SOLUTION INTRAVENOUS at 23:28

## 2020-03-08 RX ADMIN — CEFTRIAXONE 1 G: 1 INJECTION, POWDER, FOR SOLUTION INTRAMUSCULAR; INTRAVENOUS at 21:41

## 2020-03-08 RX ADMIN — TAMSULOSIN HYDROCHLORIDE 0.4 MG: 0.4 CAPSULE ORAL at 18:52

## 2020-03-08 RX ADMIN — MORPHINE SULFATE 4 MG: 4 INJECTION, SOLUTION INTRAMUSCULAR; INTRAVENOUS at 18:28

## 2020-03-08 RX ADMIN — ONDANSETRON 4 MG: 2 INJECTION INTRAMUSCULAR; INTRAVENOUS at 18:27

## 2020-03-08 RX ADMIN — PHENAZOPYRIDINE HYDROCHLORIDE 200 MG: 100 TABLET ORAL at 21:40

## 2020-03-08 RX ADMIN — DIPHENHYDRAMINE HYDROCHLORIDE 25 MG: 50 INJECTION, SOLUTION INTRAMUSCULAR; INTRAVENOUS at 20:00

## 2020-03-08 RX ADMIN — KETOROLAC TROMETHAMINE 30 MG: 30 INJECTION, SOLUTION INTRAMUSCULAR at 19:56

## 2020-03-08 ASSESSMENT — PAIN SCALES - GENERAL
PAINLEVEL_OUTOF10: 7
PAINLEVEL_OUTOF10: 10
PAINLEVEL_OUTOF10: 8
PAINLEVEL_OUTOF10: 10
PAINLEVEL_OUTOF10: 7
PAINLEVEL_OUTOF10: 7

## 2020-03-08 ASSESSMENT — ENCOUNTER SYMPTOMS
BLOOD IN STOOL: 0
ABDOMINAL PAIN: 1
DIARRHEA: 1
SHORTNESS OF BREATH: 0
VOMITING: 1
NAUSEA: 1
ABDOMINAL DISTENTION: 0
COLOR CHANGE: 0

## 2020-03-08 ASSESSMENT — PAIN - FUNCTIONAL ASSESSMENT
PAIN_FUNCTIONAL_ASSESSMENT: PREVENTS OR INTERFERES WITH MANY ACTIVE NOT PASSIVE ACTIVITIES
PAIN_FUNCTIONAL_ASSESSMENT: ACTIVITIES ARE NOT PREVENTED

## 2020-03-08 ASSESSMENT — PAIN DESCRIPTION - FREQUENCY
FREQUENCY: CONTINUOUS
FREQUENCY: CONTINUOUS

## 2020-03-08 ASSESSMENT — PAIN DESCRIPTION - ORIENTATION
ORIENTATION: LOWER
ORIENTATION: LOWER

## 2020-03-08 ASSESSMENT — PAIN DESCRIPTION - PAIN TYPE
TYPE: ACUTE PAIN
TYPE: ACUTE PAIN

## 2020-03-08 ASSESSMENT — PAIN DESCRIPTION - DESCRIPTORS
DESCRIPTORS: SHARP;SHOOTING;STABBING
DESCRIPTORS: ACHING;SHARP

## 2020-03-08 ASSESSMENT — PAIN DESCRIPTION - PROGRESSION
CLINICAL_PROGRESSION: GRADUALLY WORSENING
CLINICAL_PROGRESSION: NOT CHANGED

## 2020-03-08 ASSESSMENT — PAIN DESCRIPTION - LOCATION
LOCATION: BACK
LOCATION: ABDOMEN

## 2020-03-08 ASSESSMENT — PAIN DESCRIPTION - DIRECTION: RADIATING_TOWARDS: BOTH SIDES

## 2020-03-08 NOTE — ED PROVIDER NOTES
[address] [facility]      Pt Name: Vinay Villareal  HUH:[id number]  [place] 1980  Date of evaluation: 3/8/2020  Provider: FREDRICK Desai - CNP   Attending provider:  Hayes Resendiz MD      Chief Complaint:    Chief Complaint   Patient presents with    Emesis     patient states seen at [facility] ED 2 days ago. dx with UTI. Rx provided for po antibiotics. now feeling worse.  + vomiting and low back pain. \"not keeping anything down\". hx of urosepsis. + indwelling cath for over one month    Back Pain       Nursing Notes, Past Medical Hx, Past Surgical Hx, Social Hx, Allergies, and Family Hx were all reviewed and agreed with or any disagreements were addressed in the HPI.    HPI:  (Location, Duration, Timing, Severity, Quality, Assoc Sx, Context, Modifying factors)  This is a  42 y.o. female who presents to the emergency department today complaining of bladder and kidney pain as well as fever. She advised that she had a Guzman catheter placed 4 weeks ago at [facility] due to urinary retention and that she has had pain and difficulty ever since. She has been seen in the emergency department multiple times over the last 4 weeks. She came in today because she developed a fever as well as nausea and vomiting. She had one episode of diarrhea. She has not taken anything for fever. She had a CT scan done 2 days ago which showed one 3 mm stone in the right kidney. Patient is requesting the Guzman catheter be removed.     PastMedical/Surgical History:      Diagnosis Date    ADHD (attention deficit hyperactivity disorder) 82    Anesthesia complication     pt states with general anesthesia, she gets very nauseated and wakes up with a migrain     Depression with anxiety     Difficult intubation     ESBL (extended spectrum beta-lactamase) producing bacteria infection 11/06/2019    urine    Functional ovarian cysts 2008    rt ovary cyst x 2 (bladder pain), diarrhea (x1), nausea and vomiting. Negative for abdominal distention and blood in stool. Genitourinary: Positive for flank pain (Bilateral kidney pain). Negative for hematuria, vaginal bleeding and vaginal discharge. Indwelling Guzman catheter   Musculoskeletal: Negative for neck pain and neck stiffness. Skin: Negative for color change and rash. Allergic/Immunologic: Negative for immunocompromised state. Neurological: Negative for dizziness, weakness, numbness and headaches. Hematological: Negative for adenopathy. Psychiatric/Behavioral: Negative for confusion. All other systems reviewed and are negative. Positives and Pertinent negatives as per HPI. Except as noted above in the ROS, problem specific ROS was completed and is negative. Physical Exam:  Physical Exam  Vitals signs and nursing note reviewed. Constitutional:       General: She is not in acute distress. Appearance: Normal appearance. She is well-developed. She is not toxic-appearing. HENT:      Head: Normocephalic and atraumatic. Eyes:      General: No scleral icterus. Conjunctiva/sclera: Conjunctivae normal.   Neck:      Musculoskeletal: Full passive range of motion without pain and neck supple. No neck rigidity. Vascular: No JVD. Cardiovascular:      Rate and Rhythm: Regular rhythm. Tachycardia present. Heart sounds: No murmur. No friction rub. No gallop. Pulmonary:      Effort: Pulmonary effort is normal. No respiratory distress. Breath sounds: Normal breath sounds. Abdominal:      General: Bowel sounds are normal. There is no distension. Palpations: Abdomen is soft. Abdomen is not rigid. Tenderness: There is abdominal tenderness. There is right CVA tenderness and left CVA tenderness. There is no guarding or rebound. Musculoskeletal: Normal range of motion. Skin:     General: Skin is warm and dry. Findings: No rash.    Neurological:      General: No [id number]   Phone [phone]   RAPID INFLUENZA A/B ANTIGENS    Narrative:     Performed at:  [facility]  [address] [place], [place] [address]   Phone [phone]   CULTURE, BLOOD 1   CULTURE, BLOOD 2   CULTURE, URINE   CBC WITH AUTO DIFFERENTIAL    Narrative:     Performed at:  [facility]  [address] [place], [place] [address]   Phone [phone]   LACTIC ACID, PLASMA    Narrative:     Performed at:  [facility]  [address] [place], [place] [address]   Phone [phone] of labs reviewed and werenegative at this time or not returned at the time of this note. RADIOLOGY:   Non-plain film images such as CT, Ultrasound and MRI are read by the radiologist. FREDRICK Roberson CNP have directly visualized the radiologic plain film image(s) with the below findings:        Interpretation per the Radiologist below, if available at the time of this note:    No orders to display        Xr Chest Standard (2 Vw)    Result Date: 2/18/2020  EXAMINATION: TWO XRAY VIEWS OF THE CHEST 2/18/2020 4:53 pm COMPARISON: 11/19/2019 HISTORY: ORDERING SYSTEM PROVIDED HISTORY: shortness of breath TECHNOLOGIST PROVIDED HISTORY: Reason for exam:->shortness of breath Reason for Exam: shortness of breath Acuity: Acute Type of Exam: Initial FINDINGS: The lungs are without acute focal process. There is no effusion or pneumothorax. The cardiomediastinal silhouette is without acute process. The osseous structures are without acute process. Redemonstration of a left ventricular peritoneal shunt catheter which appears in stable position. No acute process.  shunt catheter redemonstrated on the left.      Ct Abdomen Pelvis W Iv Contrast Additional Contrast? None    Result Date: 3/6/2020  EXAMINATION: CT OF THE ABDOMEN AND PELVIS WITH CONTRAST 3/6/2020 8:47 pm TECHNIQUE: CT of the abdomen and pelvis was performed with the administration of intravenous contrast. Multiplanar reformatted images are provided for review. Dose modulation, iterative reconstruction, and/or weight based adjustment of the mA/kV was utilized to reduce the radiation dose to as low as reasonably achievable. COMPARISON: CT dated 2020. HISTORY: ORDERING SYSTEM PROVIDED HISTORY: abd pain TECHNOLOGIST PROVIDED HISTORY: Reason for exam:->abd pain Additional Contrast?->None Reason for Exam: Urinary Tract Infection (has silva catheter in for ppast month, Was placed while in the ER at [facility]. Was sent home with the catheter. Went to [facility] 2 weeks ago for urosepsis, catheter was replaced while at [facility], signed out [place]. Went back to [facility] 2 days ago, was discharged. Today feeling light headed, bilateral flank pain, vomiting, fever (took Tylenol supp at 3PM today), and mid abd pain) Acuity: Acute Type of Exam: Initial Relevant Medical/Surgical History: shunt, cholecystectomy, hysterectomy, kidney stones,  FINDINGS: Lower Chest: The lung bases are clear. Organs: The patient is status post cholecystectomy with mild postoperative dilatation of the biliary ducts. The liver is otherwise normal.  The spleen and pancreas are normal.  The adrenal glands are normal bilaterally. There is a stable approximate 3 mm nonobstructing calculus within the right kidney lower pole. There is no evidence of hydronephrosis. There is no hydroureter. There is no abnormal ureteral and into suggest ascending infection. GI/Bowel: There is no bowel wall thickening. There is no bowel obstruction. The appendix is surgically absent. There is mild diverticulosis without evidence of diverticulitis. Pelvis: There is a Silva catheter within a partially collapsed urinary bladder. The patient is status post hysterectomy. There is stable chronic postoperative change and mild stranding within the hysterectomy bed. No pathologic pelvic lymphadenopathy is identified. diverticulosis, without evidence of diverticulitis. MEDICAL DECISION MAKING / ED COURSE:      CRITICAL CARE NOTE:  There was a high probability of clinically significant life-threatening deterioration of the patient's condition requiring my urgent intervention. Total critical care time was at least 32 minutes. This includes vital sign monitoring, pulse oximetry monitoring, telemetry monitoring, clinical response to the IV medications, reviewing the nursing notes, consultation time, dictation/documentation time, and interpretation of the labwork. This excludes any separately billable procedures performed. PROCEDURES:   Procedures    None    Patient was given:  Medications   meropenem (MERREM) 1 g in sodium chloride 0.9 % 100 mL IVPB (mini-bag) (0 g Intravenous Stopped 3/9/20 0140)   0.9 % sodium chloride bolus (0 mLs Intravenous Stopped 3/8/20 2142)   acetaminophen (TYLENOL) tablet 1,000 mg (1,000 mg Oral Given 3/8/20 1849)   ondansetron (ZOFRAN) injection 4 mg (4 mg Intravenous Given 3/8/20 1827)   morphine (PF) injection 4 mg (4 mg Intravenous Given 3/8/20 1828)   tamsulosin (FLOMAX) capsule 0.4 mg (0.4 mg Oral Given 3/8/20 1852)   ketorolac (TORADOL) injection 30 mg (30 mg Intravenous Given 3/8/20 1956)   diphenhydrAMINE (BENADRYL) injection 25 mg (25 mg Intravenous Given 3/8/20 2000)   cefTRIAXone (ROCEPHIN) 1 g IVPB in 50 mL D5W minibag (0 g Intravenous Stopped 3/8/20 2324)   phenazopyridine (PYRIDIUM) tablet 200 mg (200 mg Oral Given 3/8/20 2140)   oxyCODONE-acetaminophen (PERCOCET) 5-325 MG per tablet 1 tablet (1 tablet Oral Given 3/8/20 2327)       Differential Diagnosis: Urinary retention, pyelonephritis, bladder infection, urosepsis, GI emergency, surgical emergency, dehydration, musculoskeletal back pain, vaginal candidiasis, other    Patient presents with kidney and bladder pain associated with fever and tachycardia. See HPI for full presentation. Physical exam as above.   Bladder and

## 2020-03-09 VITALS
WEIGHT: 165.34 LBS | HEIGHT: 59 IN | SYSTOLIC BLOOD PRESSURE: 108 MMHG | OXYGEN SATURATION: 98 % | BODY MASS INDEX: 33.33 KG/M2 | TEMPERATURE: 98 F | HEART RATE: 76 BPM | DIASTOLIC BLOOD PRESSURE: 67 MMHG | RESPIRATION RATE: 19 BRPM

## 2020-03-09 ASSESSMENT — PAIN DESCRIPTION - PROGRESSION: CLINICAL_PROGRESSION: RESOLVED

## 2020-03-09 ASSESSMENT — PAIN SCALES - GENERAL
PAINLEVEL_OUTOF10: 0
PAINLEVEL_OUTOF10: 0

## 2020-03-09 NOTE — ED NOTES
RN at bedside introducing self to pt along with updating pt on plan of care. Pt was given juice and sandwich.        Sanam Petersen RN  03/09/20 4838

## 2020-03-09 NOTE — ED NOTES
Pt states that the stickers for the tele monitor make her break out. She refuses to wear monitor for this reason at this time.  Explained to pt need for heart rate monitoring  Sachin Hurtado NP notified      Harsha Rodriguez RN  03/08/20 2482

## 2020-03-11 LAB
BLOOD CULTURE, ROUTINE: NORMAL
CULTURE, BLOOD 2: NORMAL
ORGANISM: ABNORMAL
URINE CULTURE, ROUTINE: ABNORMAL

## 2020-03-11 NOTE — RESULT ENCOUNTER NOTE
Patient's positive result has been appropriately evaluated by the provider pool. Patient was transferred to [facility] was contacted.  Copy of results faxed to [phone]

## 2020-03-12 LAB — BLOOD CULTURE, ROUTINE: NORMAL

## 2020-03-13 LAB — CULTURE, BLOOD 2: NORMAL

## 2020-03-14 ENCOUNTER — HOSPITAL ENCOUNTER (EMERGENCY)
Age: 40
Discharge: HOME | End: 2020-03-14
Attending: EMERGENCY MEDICINE
Payer: COMMERCIAL

## 2020-03-14 VITALS
TEMPERATURE: 99.6 F | SYSTOLIC BLOOD PRESSURE: 137 MMHG | RESPIRATION RATE: 14 BRPM | HEIGHT: 59 IN | DIASTOLIC BLOOD PRESSURE: 90 MMHG | BODY MASS INDEX: 33.2 KG/M2 | HEART RATE: 101 BPM | WEIGHT: 164.68 LBS | OXYGEN SATURATION: 99 %

## 2020-03-14 PROCEDURE — 99283 EMERGENCY DEPT VISIT LOW MDM: CPT

## 2020-03-14 RX ORDER — HYDROCODONE BITARTRATE AND ACETAMINOPHEN 5; 325 MG/1; MG/1
1 TABLET ORAL EVERY 6 HOURS PRN
Qty: 10 TABLET | Refills: 0 | Status: SHIPPED | OUTPATIENT
Start: 2020-03-14 | End: 2020-03-17

## 2020-03-14 ASSESSMENT — PAIN DESCRIPTION - ORIENTATION: ORIENTATION: RIGHT

## 2020-03-14 ASSESSMENT — PAIN DESCRIPTION - PROGRESSION: CLINICAL_PROGRESSION: NOT CHANGED

## 2020-03-14 ASSESSMENT — ENCOUNTER SYMPTOMS
SORE THROAT: 0
ABDOMINAL PAIN: 0
EYE REDNESS: 0
NAUSEA: 0
APNEA: 0
VOMITING: 0
CHOKING: 0
SHORTNESS OF BREATH: 0
FACIAL SWELLING: 0
EYE DISCHARGE: 0
BACK PAIN: 0

## 2020-03-14 ASSESSMENT — PAIN SCALES - GENERAL
PAINLEVEL_OUTOF10: 8
PAINLEVEL_OUTOF10: 8

## 2020-03-14 ASSESSMENT — PAIN DESCRIPTION - FREQUENCY: FREQUENCY: CONTINUOUS

## 2020-03-14 ASSESSMENT — PAIN DESCRIPTION - DESCRIPTORS: DESCRIPTORS: ACHING

## 2020-03-14 ASSESSMENT — PAIN - FUNCTIONAL ASSESSMENT
PAIN_FUNCTIONAL_ASSESSMENT: ACTIVITIES ARE NOT PREVENTED
PAIN_FUNCTIONAL_ASSESSMENT: 0-10

## 2020-03-14 ASSESSMENT — PAIN DESCRIPTION - ONSET: ONSET: GRADUAL

## 2020-03-14 ASSESSMENT — PAIN DESCRIPTION - LOCATION: LOCATION: ARM

## 2020-03-14 ASSESSMENT — PAIN DESCRIPTION - PAIN TYPE: TYPE: ACUTE PAIN

## 2020-03-14 NOTE — ED PROVIDER NOTES
[facility]      Pt Name: Dilip Carrillo  ABN:[id number]  [place] 1980  Date of evaluation: 3/14/2020  Provider: Carmela Peña PA-C      Chief Complaint:    Chief Complaint   Patient presents with    Wound Check     pt states she had surgery 2 days ago on her bladder, had a PICC line for surgery now has right arm pain where PICC was. Pt also stated the doctor forgot to send her home with pain medication and told her to come here to get it. Nursing Notes, Past Medical Hx, Past Surgical Hx, Social Hx, Allergies, and Family Hx were all reviewed and agreed with or any disagreements were addressed in the HPI.    HPI:  (Location, Duration, Timing, Severity, Quality, Assoc Sx, Context, Modifying factors)  This is a  42 y.o. female complaint of right arm pain. Patient states since she had her PICC line removed she does have pain in the right arm she called her surgeon and there spoke to call her in something or she is supposed to pick it up and when she did not hear from them she called them back about 45 minutes in the office was closed. She called her primary doctor who told her to come to emergency room. She denies fever, says he might had a low-grade fever at home temperature 99. Says she has had pain when the PICC line was in place. She had surgery and had a balloon put in her bladder. That is to keep it dilated. She has had urosepsis several times in the past.  She had a PICC line removed 3 days ago. And still having some pain to her right upper arm.       PastMedical/Surgical History:      Diagnosis Date    ADHD (attention deficit hyperactivity disorder) 82    Anesthesia complication     pt states with general anesthesia, she gets very nauseated and wakes up with a migrain     Depression with anxiety     Difficult intubation     ESBL (extended spectrum beta-lactamase) producing bacteria infection 11/06/2019 urine    Functional ovarian cysts 2008    rt ovary cyst x 2 yrs.  Headache(784.0)     migraines    Hiatal hernia     History of blood transfusion     History of kidney stones     History of PCOS     Hydrocephalus (HCC)     Irritable bowel syndrome 2004    had colonoscopy about 6 yrs ago    Kidney stone     Meningitis     Neutrophilic leukocytosis     Nicotine dependence     Primary osteoarthritis of left knee 2016    S/P cone biopsy of cervix 2004    Scoliosis 1990.s     (ventriculoperitoneal) shunt status     hydrocephalus f/w Neurosurgeon at [facility] [id number]     reading         Procedure Laterality Date    APPENDECTOMY      appendicitis     SECTION  2005    placenta previa    CHOLECYSTECTOMY      COLONOSCOPY  2004    COLPOSCOPY      CSF SHUNT      replaced at age 17   [facility] CYSTOSCOPY      stone removal   [facility]    inguinal    HYSTERECTOMY, TOTAL ABDOMINAL  2016    TAHBSO, adhesions/PCOS    KNEE ARTHROSCOPY Left 2015    medial meniscectomy, chondroplasty, plica resection    LITHOTRIPSY Bilateral 12/10/2019    OTHER SURGICAL HISTORY  10/19/2016    op lap    VENTRICULOPERITONEAL SHUNT      multiple revisions, most recent        Medications:  Previous Medications    NITROFURANTOIN, MACROCRYSTAL-MONOHYDRATE, (MACROBID) 100 MG CAPSULE    Take 1 capsule by mouth 2 times daily for 10 days    ONDANSETRON (ZOFRAN) 4 MG TABLET    Take 1 tablet by mouth every 8 hours as needed for Nausea         Review of Systems:  Review of Systems   Constitutional: Negative for chills and fever. HENT: Negative for congestion, facial swelling and sore throat. Eyes: Negative for discharge and redness. Respiratory: Negative for apnea, choking and shortness of breath. Cardiovascular: Negative for chest pain. Gastrointestinal: Negative for abdominal pain, nausea and vomiting. Genitourinary: Negative for dysuria.    Musculoskeletal: Negative werenegative at this time or not returned at the time of this note. RADIOLOGY:   Non-plain film images such as CT, Ultrasound and MRI are read by the radiologist. Misha Banda PA-C have directly visualized the radiologic plain film image(s) with the below findings:        Interpretation per the Radiologist below, if available at the time of this note:    No orders to display        Xr Chest Standard (2 Vw)    Result Date: 2/18/2020  EXAMINATION: TWO XRAY VIEWS OF THE CHEST 2/18/2020 4:53 pm COMPARISON: 11/19/2019 HISTORY: ORDERING SYSTEM PROVIDED HISTORY: shortness of breath TECHNOLOGIST PROVIDED HISTORY: Reason for exam:->shortness of breath Reason for Exam: shortness of breath Acuity: Acute Type of Exam: Initial FINDINGS: The lungs are without acute focal process. There is no effusion or pneumothorax. The cardiomediastinal silhouette is without acute process. The osseous structures are without acute process. Redemonstration of a left ventricular peritoneal shunt catheter which appears in stable position. No acute process.  shunt catheter redemonstrated on the left. Ct Abdomen Pelvis W Iv Contrast Additional Contrast? None    Result Date: 3/6/2020  EXAMINATION: CT OF THE ABDOMEN AND PELVIS WITH CONTRAST 3/6/2020 8:47 pm TECHNIQUE: CT of the abdomen and pelvis was performed with the administration of intravenous contrast. Multiplanar reformatted images are provided for review. Dose modulation, iterative reconstruction, and/or weight based adjustment of the mA/kV was utilized to reduce the radiation dose to as low as reasonably achievable. COMPARISON: CT dated 02/19/2020. HISTORY: ORDERING SYSTEM PROVIDED HISTORY: abd pain TECHNOLOGIST PROVIDED HISTORY: Reason for exam:->abd pain Additional Contrast?->None Reason for Exam: Urinary Tract Infection (has silva catheter in for ppast month, Was placed while in the ER at [facility]. Was sent home with the catheter.  Went to [facility] 2 weeks ago for urosepsis, catheter was replaced while at [place], signed out [place]. Went back to [place] 2 days ago, was discharged. Today feeling light headed, bilateral flank pain, vomiting, fever (took Tylenol supp at 3PM today), and mid abd pain) Acuity: Acute Type of Exam: Initial Relevant Medical/Surgical History: shunt, cholecystectomy, hysterectomy, kidney stones,  FINDINGS: Lower Chest: The lung bases are clear. Organs: The patient is status post cholecystectomy with mild postoperative dilatation of the biliary ducts. The liver is otherwise normal.  The spleen and pancreas are normal.  The adrenal glands are normal bilaterally. There is a stable approximate 3 mm nonobstructing calculus within the right kidney lower pole. There is no evidence of hydronephrosis. There is no hydroureter. There is no abnormal ureteral and into suggest ascending infection. GI/Bowel: There is no bowel wall thickening. There is no bowel obstruction. The appendix is surgically absent. There is mild diverticulosis without evidence of diverticulitis. Pelvis: There is a Guzman catheter within a partially collapsed urinary bladder. The patient is status post hysterectomy. There is stable chronic postoperative change and mild stranding within the hysterectomy bed. No pathologic pelvic lymphadenopathy is identified. Peritoneum/Retroperitoneum: No intraperitoneal free air is identified. No walled-off fluid collection or abscess is seen. No pathologic lymphadenopathy is identified. There is mild atherosclerotic disease of the abdominal aorta, without aneurysm. There is a shunt catheter entering the peritoneal space within the left upper quadrant, with the catheter terminating within the left lower quadrant. Bones/Soft Tissues: There is degenerative change throughout the thoracolumbar spine with multilevel Schmorl's node deformities evident. No osteolytic or osteoblastic lesion is seen. No CT evidence of acute intra-abdominal pathology. No CT evidence of hydronephrosis, hydroureter or abnormal ureteral enhancement to suggest ascending infection. Clinical correlation and urinalysis is recommended. Ct Abdomen Pelvis W Iv Contrast Additional Contrast? None    Result Date: 2/22/2020  EXAMINATION: CT OF THE ABDOMEN AND PELVIS WITH CONTRAST 2/19/2020 3:38 pm TECHNIQUE: CT of the abdomen and pelvis was performed with the administration of intravenous contrast. Multiplanar reformatted images are provided for review. Dose modulation, iterative reconstruction, and/or weight based adjustment of the mA/kV was utilized to reduce the radiation dose to as low as reasonably achievable. COMPARISON: 01/13/2020, 11/12/2019, 05/11/2006 HISTORY: ORDERING SYSTEM PROVIDED HISTORY: flank pain and abdominal pain TECHNOLOGIST PROVIDED HISTORY: Reason for exam:->flank pain and abdominal pain Additional Contrast?->None Reason for Exam: uti, no hematuria,. septic, hx stones, Acuity: Acute Type of Exam: Initial Relevant Medical/Surgical History: hyster, cher, appy FINDINGS: Lower Chest: There are mild curvilinear and ground-glass opacities within the bilateral lower lobes, most consistent with atelectasis. The lung bases are otherwise clear. Organs: The patient is status post cholecystectomy with mild postoperative dilatation of the biliary ducts. The liver is otherwise normal.  The spleen and pancreas are normal.  The adrenal glands are normal bilaterally. There is a stable approximate 3 mm nonobstructing calculus within the right kidney lower pole. However, there is no evidence of a ureteral calculus or hydronephrosis. The bilateral kidneys are otherwise unremarkable. GI/Bowel: Evaluation of the hollow GI tract demonstrates no evidence of abnormal bowel wall thickening, dilatation, or obstruction. The appendix is surgically absent. There is mild colonic diverticulosis, though no evidence of diverticulitis. Pelvis:  There is a Guzman catheter within a partially axillary lymph node. Cardiovascular regular rhythm, lungs are clear no wheeze rales or rhonchi. No chest wall tenderness with palpation. At this time I did have attending physician Dr. Cassie Finley look at this patient arm and he was okay as well with this patient all does not look infected or no clot. I will have her discharge continue to apply ice and I will put her on a few days of Norco for pain. Follow-up with her urologist Dr. Tad Schlatter return for any worsening. She understood discharge plan she will be discharged stable condition. The patient tolerated their visit well. I evaluated the patient. The physician was available for consultation as needed. The patient and / or the family were informed of the results of any tests, a time was given to answer questions, a plan was proposed and they agreed with plan. CLINICAL IMPRESSION:  1. Right arm pain        DISPOSITION Decision To Discharge 03/14/2020 07:23:12 PM      PATIENT REFERRED TO:  FREDRICK Us CNP  [address] [facility]  [phone]    Call   As needed    Sara Solano MD  [address] [place] [address]  [phone]    Call in 2 days        DISCHARGE MEDICATIONS:  New Prescriptions    HYDROCODONE-ACETAMINOPHEN (NORCO) 5-325 MG PER TABLET    Take 1 tablet by mouth every 6 hours as needed for Pain for up to 3 days.        DISCONTINUED MEDICATIONS:  Discontinued Medications    No medications on file              (Please note the MDM and HPI sections of this note were completed with a voice recognition program.  Efforts were made to edit the dictations but occasionally words are mis-transcribed.)    Electronically signed, Melissa Marcano PA-C,          Melissa Marcano PA-C  03/14/20 [id number]

## 2020-03-14 NOTE — ED NOTES
Pt has small localized erythremia to the right mid-arm where pt states had a PICC line, pt c/o pain to the right upper arm. Right upper arm is absent of redness/warmth/edema. Pt states that she called her surgeon and they were told to come to the ED.         Jennifer Murrell RN  03/14/20 [id number]

## 2020-03-17 NOTE — ED PROVIDER NOTES
Attending Supervisory Note/Shared Visit   I have personally performed a face to face diagnostic evaluation on this patient. I have reviewed the mid-levels findings and agree. History and Exam by me shows a well-appearing female no acute distress. Patient is a complicated past medical history but has an indwelling Guzman catheter and has had repeated urinary tract infections. Reviewing old cultures the patient does have a history of ESBL for multiple years ago. Patient was recently seen at a freestanding emergency department was started on antibiotics. She continues to have urinary symptoms. Patient states she developed fevers with associated nausea vomiting and back pain today and presented to the ED for evaluation. States that her symptoms are similar to previous episodes of UTI. On presentation patient noted to be tachycardic and febrile. This improved with symptomatic treatment. At time of evaluation vital signs improved. Abdomen soft nontender nondistended. No CVA tenderness. Lungs clear to auscultation. Patient's abdomen work-up did not show any emergent normalities. Urinalysis concerning for UTI as she has pyuria. Renal function within normal limits. Initial lactate elevated 2.4 concerning for severe sepsis. given the patient's recurrent symptoms and history of ESBL I am concerned that the patient not being treated appropriately with outpatient antibiotics. Admission discussed with the patient is amenable to treatment plan. Patient requested transfer because her urologist is another facility. Patient transferred in stable condition. I agree with the [facility] plan of care. Please [facility] Note for full ED course and final disposition. Disposition:  1. Septicemia ([facility] [address]    2. Urinary tract infection without hematuria, site unspecified    3.  History of ESBL E. coli infection          Jennifer Medina MD  Attending Emergency Physician       Jennifer Medina MD  03/17/20 [id number]

## 2020-03-24 ENCOUNTER — TELEPHONE (OUTPATIENT)
Dept: INTERNAL MEDICINE CLINIC | Age: 40
End: 2020-03-24

## 2020-03-25 RX ORDER — TERBINAFINE HYDROCHLORIDE 250 MG/1
250 TABLET ORAL DAILY
Qty: 42 TABLET | Refills: 0 | Status: SHIPPED | OUTPATIENT
Start: 2020-03-25 | End: 2020-05-06

## 2020-03-25 RX ORDER — PROMETHAZINE HYDROCHLORIDE 12.5 MG/1
12.5 TABLET ORAL EVERY 12 HOURS PRN
Qty: 10 TABLET | Refills: 0 | Status: SHIPPED | OUTPATIENT
Start: 2020-03-25 | End: 2020-03-30

## 2020-03-25 NOTE — TELEPHONE ENCOUNTER
Called and spoke with Petrona notified script sent. Petrona asked if there is any way she can also get some phenergan the zofran is not helping currently. And she is extremely nauseated.

## 2020-04-14 ENCOUNTER — VIRTUAL VISIT (OUTPATIENT)
Dept: INTERNAL MEDICINE CLINIC | Age: 40
End: 2020-04-14
Payer: COMMERCIAL

## 2020-04-14 PROCEDURE — 99213 OFFICE O/P EST LOW 20 MIN: CPT | Performed by: NURSE PRACTITIONER

## 2020-04-14 PROCEDURE — 4004F PT TOBACCO SCREEN RCVD TLK: CPT | Performed by: NURSE PRACTITIONER

## 2020-04-14 PROCEDURE — G8417 CALC BMI ABV UP PARAM F/U: HCPCS | Performed by: NURSE PRACTITIONER

## 2020-04-14 PROCEDURE — 99406 BEHAV CHNG SMOKING 3-10 MIN: CPT | Performed by: NURSE PRACTITIONER

## 2020-04-14 PROCEDURE — G8427 DOCREV CUR MEDS BY ELIG CLIN: HCPCS | Performed by: NURSE PRACTITIONER

## 2020-04-14 NOTE — PROGRESS NOTES
2020    TELEHEALTH EVALUATION -- Audio/Visual (During [facility]- public health emergency)    HPI:    Cordelia To (:  1980) has requested an audio/video evaluation for the following concern(s)  Right hand numbness/tingling    Right Hand/wrist pain  This problem is new x1 month and fluctuating. Patient states that she is right hand dominant. States that since COVID, she has been doing hair in her home. She reports increased ROM of her right and and suspects that she has carpal tunnel. She reports concurrent numbness/tingling to right wrist/hand. States pain is relieved w/rest. Denies known trauma. Denies fevers/chills/rash/erythema. Reports trace edema after using hand all day with repetitive motions. Review of Systems  Negative aside from mentioned above in HPI   Prior to Visit Medications    Medication Sig Taking? Authorizing Provider   terbinafine (LAMISIL) 250 MG tablet Take 1 tablet by mouth daily  Sharlene Denver, APRN - CNP   ondansetron (ZOFRAN) 4 MG tablet Take 1 tablet by mouth every 8 hours as needed for Nausea  Cece Llanos, APRN - CNP       Social History     Tobacco Use    Smoking status: Current Every Day Smoker     Packs/day: 0.50     Years: 18.00     Pack years: 9.00     Types: Cigarettes    Smokeless tobacco: Never Used   Substance Use Topics    Alcohol use: No     Alcohol/week: 0.0 standard drinks    Drug use: No           PHYSICAL EXAMINATION:  [ INSTRUCTIONS:  \"[x]\" Indicates a positive item  \"[]\" Indicates a negative item  -- DELETE ALL ITEMS NOT EXAMINED]    Constitutional: [x] Appears well-developed and well-nourished [x] No apparent distress      [] Abnormal-   Mental status  [x] Alert and awake  [x] Oriented to person/place/time [x]Able to follow commands      HENT:   [x] Normocephalic, atraumatic.   [] Abnormal   [x] Mouth/Throat: Mucous membranes are moist.     External Ears [x] Normal  [] Abnormal-     Neck: [x] No visualized mass     Pulmonary/Chest:

## 2020-04-15 ENCOUNTER — HOSPITAL ENCOUNTER (EMERGENCY)
Age: 40
Discharge: HOME | End: 2020-04-15
Payer: COMMERCIAL

## 2020-04-15 ENCOUNTER — APPOINTMENT (OUTPATIENT)
Dept: CT IMAGING | Age: 40
End: 2020-04-15
Payer: COMMERCIAL

## 2020-04-15 VITALS
BODY MASS INDEX: 34.53 KG/M2 | DIASTOLIC BLOOD PRESSURE: 85 MMHG | HEIGHT: 59 IN | RESPIRATION RATE: 18 BRPM | WEIGHT: 171.3 LBS | OXYGEN SATURATION: 100 % | SYSTOLIC BLOOD PRESSURE: 106 MMHG | HEART RATE: 101 BPM | TEMPERATURE: 98.9 F

## 2020-04-15 LAB
A/G RATIO: 1.1 (ref 1.1–2.2)
ALBUMIN SERPL-MCNC: 4.3 G/DL (ref 3.4–5)
ALP BLD-CCNC: 97 U/L (ref 40–129)
ALT SERPL-CCNC: 21 U/L (ref 10–40)
ANION GAP SERPL CALCULATED.3IONS-SCNC: 17 MMOL/L (ref 3–16)
AST SERPL-CCNC: 14 U/L (ref 15–37)
BACTERIA: ABNORMAL /HPF
BASOPHILS ABSOLUTE: 0.1 K/UL (ref 0–0.2)
BASOPHILS RELATIVE PERCENT: 1 %
BILIRUB SERPL-MCNC: 0.3 MG/DL (ref 0–1)
BILIRUBIN URINE: NEGATIVE
BLOOD, URINE: NEGATIVE
BUN BLDV-MCNC: 12 MG/DL (ref 7–20)
CALCIUM SERPL-MCNC: 10 MG/DL (ref 8.3–10.6)
CHLORIDE BLD-SCNC: 100 MMOL/L (ref 99–110)
CLARITY: ABNORMAL
CO2: 24 MMOL/L (ref 21–32)
COLOR: YELLOW
COMMENT UA: ABNORMAL
CREAT SERPL-MCNC: 0.8 MG/DL (ref 0.6–1.1)
EOSINOPHILS ABSOLUTE: 0.2 K/UL (ref 0–0.6)
EOSINOPHILS RELATIVE PERCENT: 1.8 %
EPITHELIAL CELLS, UA: >36 /HPF (ref 0–5)
GFR AFRICAN AMERICAN: >60
GFR NON-AFRICAN AMERICAN: >60
GLOBULIN: 3.8 G/DL
GLUCOSE BLD-MCNC: 107 MG/DL (ref 70–99)
GLUCOSE URINE: NEGATIVE MG/DL
HCG(URINE) PREGNANCY TEST: NEGATIVE
HCT VFR BLD CALC: 49.6 % (ref 36–48)
HEMOGLOBIN: 16.5 G/DL (ref 12–16)
KETONES, URINE: NEGATIVE MG/DL
LEUKOCYTE ESTERASE, URINE: NEGATIVE
LIPASE: 16 U/L (ref 13–60)
LYMPHOCYTES ABSOLUTE: 4.8 K/UL (ref 1–5.1)
LYMPHOCYTES RELATIVE PERCENT: 43.6 %
MCH RBC QN AUTO: 29.5 PG (ref 26–34)
MCHC RBC AUTO-ENTMCNC: 33.3 G/DL (ref 31–36)
MCV RBC AUTO: 88.5 FL (ref 80–100)
MICROSCOPIC EXAMINATION: YES
MONOCYTES ABSOLUTE: 0.7 K/UL (ref 0–1.3)
MONOCYTES RELATIVE PERCENT: 6.1 %
NEUTROPHILS ABSOLUTE: 5.3 K/UL (ref 1.7–7.7)
NEUTROPHILS RELATIVE PERCENT: 47.5 %
NITRITE, URINE: NEGATIVE
PDW BLD-RTO: 12.7 % (ref 12.4–15.4)
PH UA: 7 (ref 5–8)
PLATELET # BLD: 313 K/UL (ref 135–450)
PMV BLD AUTO: 7.5 FL (ref 5–10.5)
POTASSIUM SERPL-SCNC: 4.1 MMOL/L (ref 3.5–5.1)
PROTEIN UA: NEGATIVE MG/DL
RBC # BLD: 5.61 M/UL (ref 4–5.2)
RBC UA: 1 /HPF (ref 0–4)
SODIUM BLD-SCNC: 141 MMOL/L (ref 136–145)
SPECIFIC GRAVITY UA: 1.01 (ref 1–1.03)
TOTAL PROTEIN: 8.1 G/DL (ref 6.4–8.2)
URINE REFLEX TO CULTURE: YES
URINE TYPE: ABNORMAL
UROBILINOGEN, URINE: 0.2 E.U./DL
WBC # BLD: 11.1 K/UL (ref 4–11)
WBC UA: 17 /HPF (ref 0–5)

## 2020-04-15 PROCEDURE — 96365 THER/PROPH/DIAG IV INF INIT: CPT

## 2020-04-15 PROCEDURE — 2580000003 HC RX 258: Performed by: NURSE PRACTITIONER

## 2020-04-15 PROCEDURE — 6360000002 HC RX W HCPCS: Performed by: NURSE PRACTITIONER

## 2020-04-15 PROCEDURE — 87086 URINE CULTURE/COLONY COUNT: CPT

## 2020-04-15 PROCEDURE — 80053 COMPREHEN METABOLIC PANEL: CPT

## 2020-04-15 PROCEDURE — 81001 URINALYSIS AUTO W/SCOPE: CPT

## 2020-04-15 PROCEDURE — 99284 EMERGENCY DEPT VISIT MOD MDM: CPT

## 2020-04-15 PROCEDURE — 96375 TX/PRO/DX INJ NEW DRUG ADDON: CPT

## 2020-04-15 PROCEDURE — 85025 COMPLETE CBC W/AUTO DIFF WBC: CPT

## 2020-04-15 PROCEDURE — 74176 CT ABD & PELVIS W/O CONTRAST: CPT

## 2020-04-15 PROCEDURE — 83690 ASSAY OF LIPASE: CPT

## 2020-04-15 PROCEDURE — 84703 CHORIONIC GONADOTROPIN ASSAY: CPT

## 2020-04-15 RX ORDER — FENTANYL CITRATE 50 UG/ML
25 INJECTION, SOLUTION INTRAMUSCULAR; INTRAVENOUS ONCE
Status: COMPLETED | OUTPATIENT
Start: 2020-04-15 | End: 2020-04-15

## 2020-04-15 RX ORDER — SODIUM CHLORIDE, SODIUM LACTATE, POTASSIUM CHLORIDE, AND CALCIUM CHLORIDE .6; .31; .03; .02 G/100ML; G/100ML; G/100ML; G/100ML
1000 INJECTION, SOLUTION INTRAVENOUS ONCE
Status: COMPLETED | OUTPATIENT
Start: 2020-04-15 | End: 2020-04-15

## 2020-04-15 RX ADMIN — SODIUM CHLORIDE, POTASSIUM CHLORIDE, SODIUM LACTATE AND CALCIUM CHLORIDE 1000 ML: 600; 310; 30; 20 INJECTION, SOLUTION INTRAVENOUS at 16:18

## 2020-04-15 RX ADMIN — FENTANYL CITRATE 25 MCG: 50 INJECTION INTRAMUSCULAR; INTRAVENOUS at 16:19

## 2020-04-15 RX ADMIN — Medication 12.5 MG: at 16:18

## 2020-04-15 ASSESSMENT — ENCOUNTER SYMPTOMS
NAUSEA: 1
VOMITING: 1
RESPIRATORY NEGATIVE: 1
ABDOMINAL PAIN: 1

## 2020-04-15 ASSESSMENT — PAIN SCALES - GENERAL
PAINLEVEL_OUTOF10: 0
PAINLEVEL_OUTOF10: 9
PAINLEVEL_OUTOF10: 9

## 2020-04-15 NOTE — ED PROVIDER NOTES
and Zosyn [piperacillin sod-tazobactam so]    FAMILY HISTORY           Problem Relation Age of Onset    High Blood Pressure Mother     Diabetes Mother     High Cholesterol Mother     Depression Mother     Diabetes Maternal Grandmother     High Blood Pressure Maternal Grandmother     High Cholesterol Maternal Grandmother     Heart Disease Maternal Grandfather     High Blood Pressure Maternal Grandfather     Heart Disease Paternal Grandmother     Stroke Paternal Grandfather     Depression Sister     Cirrhosis Father     Rheum Arthritis Neg Hx     Osteoarthritis Neg Hx     Asthma Neg Hx     Breast Cancer Neg Hx     Cancer Neg Hx     Heart Failure Neg Hx     Hypertension Neg Hx     Migraines Neg Hx     Ovarian Cancer Neg Hx     Rashes/Skin Problems Neg Hx     Seizures Neg Hx     Thyroid Disease Neg Hx      Family Status   Relation Name Status    Mother  Alive    MGM  Alive        copd, goiter    MGF      PGM  Alive        alcoholic    PGF  Alive   [place] Sister  Alive        scoliosis    Father   at age 50        cirhosis liver    Sister  Alive        scoliosis    Brother  Alive        adhd, mild autism    Neg Hx  (Not Specified)        SOCIAL HISTORY      reports that she has been smoking cigarettes. She has a 9.00 pack-year smoking history. She has never used smokeless tobacco. She reports that she does not drink alcohol or use drugs. PHYSICAL EXAM    (up to 7 for level 4, 8 or more for level 5)     ED Triage Vitals   Enc Vitals Group      BP 04/15/20 1540 128/82      Pulse 04/15/20 1540 101      Resp 04/15/20 1540 18      Temp 04/15/20 1530 98.9 °F (37.2 °C)      Temp Source 04/15/20 1530 Oral      SpO2 04/15/20 1540 100 %      Weight 04/15/20 1540 171 lb 4.8 oz (77.7 kg)      Height 04/15/20 1540 4' 11\" (1.499 m)      Head Circumference --       Peak Flow --       Pain Score --       Pain Loc --       Pain Edu? --       Excl.  in [address]? --         Physical Exam  Vitals signs and nursing note reviewed. Constitutional:       General: She is not in acute distress. Appearance: Normal appearance. She is not ill-appearing, toxic-appearing or diaphoretic. HENT:      Nose: Nose normal.      Mouth/Throat:      Mouth: Mucous membranes are moist.      Pharynx: Oropharynx is clear. Cardiovascular:      Rate and Rhythm: Normal rate and regular rhythm. Pulses: Normal pulses. Heart sounds: Normal heart sounds. Pulmonary:      Effort: Pulmonary effort is normal.      Breath sounds: Normal breath sounds. Abdominal:      General: Bowel sounds are normal.      Palpations: Abdomen is soft. Tenderness: There is no abdominal tenderness. There is no guarding or rebound. Hernia: No hernia is present. Comments: Surgical scars consistent with surgical history   Skin:     General: Skin is warm and dry. Capillary Refill: Capillary refill takes less than 2 seconds. Neurological:      General: No focal deficit present. Mental Status: She is alert and oriented to person, place, and time. DIAGNOSTIC RESULTS     EKG: All EKG's are interpreted by the Emergency Department Physician who either signs or Co-signs this chart in the absence of a cardiologist.    RADIOLOGY:   Non-plain film images such as CT, Ultrasound and MRI are read by the radiologist. Plain radiographic images are visualized and preliminarilyinterpreted by the emergency physician with the below findings:    Interpretation per the Radiologist below,if available at the time of this note:    CT ABDOMEN PELVIS WO CONTRAST   Final Result   1. No acute findings in the abdomen or pelvis. 2. Non-obstructive nephrolithiasis.                LABS:  Labs Reviewed   CBC WITH AUTO DIFFERENTIAL - Abnormal; Notable for the following components:       Result Value    WBC 11.1 (*)     RBC 5.61 (*)     Hemoglobin 16.5 (*)     Hematocrit 49.6 (*)     All other components within normal limits    Narrative:

## 2020-04-16 LAB — URINE CULTURE, ROUTINE: NORMAL

## 2020-05-05 ENCOUNTER — PATIENT MESSAGE (OUTPATIENT)
Dept: FAMILY MEDICINE CLINIC | Age: 40
End: 2020-05-05

## 2020-05-05 NOTE — TELEPHONE ENCOUNTER
From: Corine Avelar  To: FREDRICK Heck - CNP  Sent: 5/5/2020 11:37 AM EDT  Subject: Non-Urgent Medical Question    I keep having trouble breathing and I'm throwing up and having diarrhea. The trouble breathing could be anxiety but I have a fever of 102.1 I feel like crap. My neck and head are killing me. I don't think it's Covid I think it's something to do with my shunt because I cant move my neck.  Please call me if you can someone please

## 2020-05-08 ENCOUNTER — APPOINTMENT (OUTPATIENT)
Dept: GENERAL RADIOLOGY | Age: 40
End: 2020-05-08
Payer: COMMERCIAL

## 2020-05-08 ENCOUNTER — HOSPITAL ENCOUNTER (EMERGENCY)
Age: 40
Discharge: HOME | End: 2020-05-08
Attending: EMERGENCY MEDICINE
Payer: COMMERCIAL

## 2020-05-08 ENCOUNTER — APPOINTMENT (OUTPATIENT)
Dept: CT IMAGING | Age: 40
End: 2020-05-08
Payer: COMMERCIAL

## 2020-05-08 VITALS
DIASTOLIC BLOOD PRESSURE: 82 MMHG | OXYGEN SATURATION: 99 % | WEIGHT: 150 LBS | SYSTOLIC BLOOD PRESSURE: 120 MMHG | HEIGHT: 59 IN | RESPIRATION RATE: 24 BRPM | BODY MASS INDEX: 30.24 KG/M2 | HEART RATE: 82 BPM | TEMPERATURE: 98 F

## 2020-05-08 LAB
A/G RATIO: 1.4 (ref 1.1–2.2)
ALBUMIN SERPL-MCNC: 4.5 G/DL (ref 3.4–5)
ALP BLD-CCNC: 130 U/L (ref 40–129)
ALT SERPL-CCNC: 99 U/L (ref 10–40)
ANION GAP SERPL CALCULATED.3IONS-SCNC: 13 MMOL/L (ref 3–16)
AST SERPL-CCNC: 22 U/L (ref 15–37)
BASOPHILS ABSOLUTE: 0.1 K/UL (ref 0–0.2)
BASOPHILS RELATIVE PERCENT: 0.7 %
BILIRUB SERPL-MCNC: <0.2 MG/DL (ref 0–1)
BILIRUBIN URINE: NEGATIVE
BLOOD, URINE: NEGATIVE
BUN BLDV-MCNC: 12 MG/DL (ref 7–20)
CALCIUM SERPL-MCNC: 9.8 MG/DL (ref 8.3–10.6)
CHLORIDE BLD-SCNC: 97 MMOL/L (ref 99–110)
CLARITY: CLEAR
CO2: 26 MMOL/L (ref 21–32)
COLOR: YELLOW
CREAT SERPL-MCNC: 0.6 MG/DL (ref 0.6–1.1)
EOSINOPHILS ABSOLUTE: 0.2 K/UL (ref 0–0.6)
EOSINOPHILS RELATIVE PERCENT: 1.7 %
GFR AFRICAN AMERICAN: >60
GFR NON-AFRICAN AMERICAN: >60
GLOBULIN: 3.3 G/DL
GLUCOSE BLD-MCNC: 132 MG/DL (ref 70–99)
GLUCOSE URINE: NEGATIVE MG/DL
GONADOTROPIN, CHORIONIC (HCG) QUANT: <5 MIU/ML
HCT VFR BLD CALC: 45.1 % (ref 36–48)
HEMOGLOBIN: 15.6 G/DL (ref 12–16)
KETONES, URINE: NEGATIVE MG/DL
LEUKOCYTE ESTERASE, URINE: NEGATIVE
LYMPHOCYTES ABSOLUTE: 3.3 K/UL (ref 1–5.1)
LYMPHOCYTES RELATIVE PERCENT: 31.2 %
MCH RBC QN AUTO: 30.2 PG (ref 26–34)
MCHC RBC AUTO-ENTMCNC: 34.6 G/DL (ref 31–36)
MCV RBC AUTO: 87.3 FL (ref 80–100)
MICROSCOPIC EXAMINATION: NORMAL
MONOCYTES ABSOLUTE: 0.5 K/UL (ref 0–1.3)
MONOCYTES RELATIVE PERCENT: 4.6 %
NEUTROPHILS ABSOLUTE: 6.6 K/UL (ref 1.7–7.7)
NEUTROPHILS RELATIVE PERCENT: 61.8 %
NITRITE, URINE: NEGATIVE
PDW BLD-RTO: 13.1 % (ref 12.4–15.4)
PH UA: 7 (ref 5–8)
PLATELET # BLD: 326 K/UL (ref 135–450)
PMV BLD AUTO: 7.5 FL (ref 5–10.5)
POTASSIUM REFLEX MAGNESIUM: 3.8 MMOL/L (ref 3.5–5.1)
PROTEIN UA: NEGATIVE MG/DL
RBC # BLD: 5.17 M/UL (ref 4–5.2)
SODIUM BLD-SCNC: 136 MMOL/L (ref 136–145)
SPECIFIC GRAVITY UA: 1.01 (ref 1–1.03)
TOTAL PROTEIN: 7.8 G/DL (ref 6.4–8.2)
URINE REFLEX TO CULTURE: NORMAL
URINE TYPE: NORMAL
UROBILINOGEN, URINE: 0.2 E.U./DL
WBC # BLD: 10.7 K/UL (ref 4–11)

## 2020-05-08 PROCEDURE — 80053 COMPREHEN METABOLIC PANEL: CPT

## 2020-05-08 PROCEDURE — 85025 COMPLETE CBC W/AUTO DIFF WBC: CPT

## 2020-05-08 PROCEDURE — 96372 THER/PROPH/DIAG INJ SC/IM: CPT

## 2020-05-08 PROCEDURE — 2580000003 HC RX 258: Performed by: EMERGENCY MEDICINE

## 2020-05-08 PROCEDURE — 96374 THER/PROPH/DIAG INJ IV PUSH: CPT

## 2020-05-08 PROCEDURE — 6360000004 HC RX CONTRAST MEDICATION: Performed by: EMERGENCY MEDICINE

## 2020-05-08 PROCEDURE — 96375 TX/PRO/DX INJ NEW DRUG ADDON: CPT

## 2020-05-08 PROCEDURE — 6370000000 HC RX 637 (ALT 250 FOR IP): Performed by: EMERGENCY MEDICINE

## 2020-05-08 PROCEDURE — 6360000002 HC RX W HCPCS: Performed by: EMERGENCY MEDICINE

## 2020-05-08 PROCEDURE — 74177 CT ABD & PELVIS W/CONTRAST: CPT

## 2020-05-08 PROCEDURE — 70450 CT HEAD/BRAIN W/O DYE: CPT

## 2020-05-08 PROCEDURE — 99284 EMERGENCY DEPT VISIT MOD MDM: CPT

## 2020-05-08 PROCEDURE — 71045 X-RAY EXAM CHEST 1 VIEW: CPT

## 2020-05-08 PROCEDURE — 81003 URINALYSIS AUTO W/O SCOPE: CPT

## 2020-05-08 PROCEDURE — 84702 CHORIONIC GONADOTROPIN TEST: CPT

## 2020-05-08 RX ORDER — ONDANSETRON 2 MG/ML
4 INJECTION INTRAMUSCULAR; INTRAVENOUS ONCE
Status: COMPLETED | OUTPATIENT
Start: 2020-05-08 | End: 2020-05-08

## 2020-05-08 RX ORDER — AMOXICILLIN AND CLAVULANATE POTASSIUM 875; 125 MG/1; MG/1
1 TABLET, FILM COATED ORAL ONCE
Status: COMPLETED | OUTPATIENT
Start: 2020-05-08 | End: 2020-05-08

## 2020-05-08 RX ORDER — 0.9 % SODIUM CHLORIDE 0.9 %
1000 INTRAVENOUS SOLUTION INTRAVENOUS ONCE
Status: COMPLETED | OUTPATIENT
Start: 2020-05-08 | End: 2020-05-08

## 2020-05-08 RX ORDER — PROMETHAZINE HYDROCHLORIDE 25 MG/1
25 TABLET ORAL 3 TIMES DAILY PRN
Qty: 12 TABLET | Refills: 0 | Status: SHIPPED | OUTPATIENT
Start: 2020-05-08 | End: 2020-05-15

## 2020-05-08 RX ORDER — AMOXICILLIN AND CLAVULANATE POTASSIUM 875; 125 MG/1; MG/1
1 TABLET, FILM COATED ORAL 2 TIMES DAILY
Qty: 20 TABLET | Refills: 0 | Status: SHIPPED | OUTPATIENT
Start: 2020-05-08 | End: 2020-05-18

## 2020-05-08 RX ORDER — PROMETHAZINE HYDROCHLORIDE 25 MG/ML
25 INJECTION, SOLUTION INTRAMUSCULAR; INTRAVENOUS ONCE
Status: COMPLETED | OUTPATIENT
Start: 2020-05-08 | End: 2020-05-08

## 2020-05-08 RX ORDER — ACETAMINOPHEN 325 MG/1
650 TABLET ORAL ONCE
Status: DISCONTINUED | OUTPATIENT
Start: 2020-05-08 | End: 2020-05-09 | Stop reason: HOSPADM

## 2020-05-08 RX ORDER — OXYCODONE HYDROCHLORIDE AND ACETAMINOPHEN 5; 325 MG/1; MG/1
2 TABLET ORAL ONCE
Status: COMPLETED | OUTPATIENT
Start: 2020-05-08 | End: 2020-05-08

## 2020-05-08 RX ORDER — MORPHINE SULFATE 4 MG/ML
4 INJECTION, SOLUTION INTRAMUSCULAR; INTRAVENOUS ONCE
Status: COMPLETED | OUTPATIENT
Start: 2020-05-08 | End: 2020-05-08

## 2020-05-08 RX ADMIN — PROMETHAZINE HYDROCHLORIDE 25 MG: 25 INJECTION INTRAMUSCULAR; INTRAVENOUS at 21:36

## 2020-05-08 RX ADMIN — MORPHINE SULFATE 4 MG: 4 INJECTION, SOLUTION INTRAMUSCULAR; INTRAVENOUS at 21:16

## 2020-05-08 RX ADMIN — ONDANSETRON 4 MG: 2 INJECTION INTRAMUSCULAR; INTRAVENOUS at 20:31

## 2020-05-08 RX ADMIN — IOPAMIDOL 75 ML: 755 INJECTION, SOLUTION INTRAVENOUS at 21:28

## 2020-05-08 RX ADMIN — AMOXICILLIN AND CLAVULANATE POTASSIUM 1 TABLET: 875; 125 TABLET, FILM COATED ORAL at 23:28

## 2020-05-08 RX ADMIN — OXYCODONE HYDROCHLORIDE AND ACETAMINOPHEN 2 TABLET: 5; 325 TABLET ORAL at 23:28

## 2020-05-08 RX ADMIN — SODIUM CHLORIDE 1000 ML: 9 INJECTION, SOLUTION INTRAVENOUS at 21:36

## 2020-05-08 ASSESSMENT — ENCOUNTER SYMPTOMS
COUGH: 0
SORE THROAT: 0
SHORTNESS OF BREATH: 0
ABDOMINAL PAIN: 1
RHINORRHEA: 0
WHEEZING: 0
DIARRHEA: 0
TROUBLE SWALLOWING: 0
VOMITING: 1
STRIDOR: 0
CHEST TIGHTNESS: 0
NAUSEA: 1

## 2020-05-08 ASSESSMENT — PAIN SCALES - GENERAL
PAINLEVEL_OUTOF10: 3
PAINLEVEL_OUTOF10: 9
PAINLEVEL_OUTOF10: 9
PAINLEVEL_OUTOF10: 10

## 2020-05-08 ASSESSMENT — PAIN DESCRIPTION - ORIENTATION: ORIENTATION: LEFT

## 2020-05-08 ASSESSMENT — PAIN DESCRIPTION - DESCRIPTORS: DESCRIPTORS: SHARP

## 2020-05-08 ASSESSMENT — PAIN DESCRIPTION - FREQUENCY: FREQUENCY: CONTINUOUS

## 2020-05-08 ASSESSMENT — PAIN DESCRIPTION - PAIN TYPE: TYPE: ACUTE PAIN

## 2020-05-08 ASSESSMENT — PAIN DESCRIPTION - LOCATION: LOCATION: ABDOMEN;RIB CAGE

## 2020-05-09 NOTE — ED PROVIDER NOTES
[facility]  ED  EMERGENCYDEPARTMENT ENCOUNTER      Pt Name: Forrest Oneill  MRN: [id number]  [place] 1980  Date of evaluation: 5/8/2020  Abimael Valvedre MD    CHIEF COMPLAINT       Chief Complaint   Patient presents with    Abdominal Pain     Pt states she has a L sided  shunt. Pt states it is \"pushing on an organ and causing pain and vomiting. \" She has an appointment with gen sx on 5/20. Pt called neurosx today who stated to come to ER to get evaluated. Neurosx Dr. Alvah Hodgkin at [facility].  Emesis     Pt states she passed out from vomiting         HISTORY OF PRESENT ILLNESS   (Location/Symptom, Timing/Onset,Context/Setting, Quality, Duration, Modifying Factors, Severity)  Note limiting factors. Forrest Oneill is a 42 y.o. female with hx of hydrocephalus sp VPS who presents to the emergency department for headache, abdominal pain and neck pain. Patient reports emesis that started on Tuesday, and rest of symptoms started on Wednesday. Was seen at [facility] that day with scans done and discharged to follow up with general surgeon. States last time this happened they had had to adjust the shunt in her abdomen. Patient states she came into ED today due to worsening abdominal pain. neck pain has not changed, but abdominal pain has worsened, called surgeon and was told to go to ED. -Reports subjective fever of 100.1 earlier today. Denies taking antipyretics today. HPI    Nursing Notes were reviewed. REVIEW OF SYSTEMS    (2-9 systems for level 4, 10 or more for level 5)     Review of Systems   Constitutional: Negative for activity change, appetite change, diaphoresis and fever. HENT: Negative for congestion, rhinorrhea, sore throat and trouble swallowing. Respiratory: Negative for cough, chest tightness, shortness of breath, wheezing and stridor. Cardiovascular: Negative for chest pain and palpitations. Gastrointestinal: Positive for abdominal pain, nausea and vomiting.  Negative for diarrhea. Genitourinary: Negative for dysuria and flank pain. Musculoskeletal: Positive for neck pain. Skin: Negative for rash and wound. Neurological: Positive for headaches. Negative for dizziness, weakness, light-headedness and numbness. Except as noted above the remainder of the review of systems was reviewedand negative. PAST MEDICAL HISTORY     Past Medical History:   Diagnosis Date    ADHD (attention deficit hyperactivity disorder) 82    Anesthesia complication     pt states with general anesthesia, she gets very nauseated and wakes up with a migrain     Depression with anxiety     Difficult intubation     ESBL (extended spectrum beta-lactamase) producing bacteria infection 2019    urine    Functional ovarian cysts 2008    rt ovary cyst x 2 yrs.     Headache(784.0)     migraines    Hiatal hernia     History of blood transfusion     History of kidney stones     History of PCOS     Hydrocephalus (HCC)     Irritable bowel syndrome     had colonoscopy about 6 yrs ago    Kidney stone     Meningitis     Neutrophilic leukocytosis     Nicotine dependence     Primary osteoarthritis of left knee 2016    S/P cone biopsy of cervix     Scoliosis .s     (ventriculoperitoneal) shunt status     hydrocephalus f/w Neurosurgeon at [place] [id number] Wears glasses     reading         SURGICAL HISTORY       Past Surgical History:   Procedure Laterality Date    APPENDECTOMY  2003    appendicitis     SECTION      placenta previa    CHOLECYSTECTOMY      COLONOSCOPY  2004    COLPOSCOPY      CSF SHUNT      replaced at age 17    CYSTOSCOPY      stone removal   [facility]    inguinal    HYSTERECTOMY, TOTAL ABDOMINAL  2016    TAHBSO, adhesions/PCOS    KNEE ARTHROSCOPY Left 2015    medial meniscectomy, chondroplasty, plica resection    LITHOTRIPSY Bilateral 12/10/2019    OTHER SURGICAL HISTORY  10/19/2016    op lap    VENTRICULOPERITONEAL SHUNT      multiple revisions, most recent 2015         CURRENT MEDICATIONS       Previous Medications    ONDANSETRON (ZOFRAN) 4 MG TABLET    Take 1 tablet by mouth every 8 hours as needed for Nausea       ALLERGIES     Bentyl [dicyclomine hcl]; Mushroom extract complex; Sulfa antibiotics; Vancomycin; Adhesive tape; Bee venom; Hydrocodone; Levaquin [levofloxacin]; Toradol [ketorolac tromethamine]; Ceftaroline; Daptomycin; Dicyclomine; Fioricet-codeine [butalbital-apap-caff-cod];  Prochlorperazine; and Zosyn [piperacillin sod-tazobactam so]    FAMILY HISTORY       Family History   Problem Relation Age of Onset    High Blood Pressure Mother     Diabetes Mother     High Cholesterol Mother     Depression Mother     Diabetes Maternal Grandmother     High Blood Pressure Maternal Grandmother     High Cholesterol Maternal Grandmother     Heart Disease Maternal Grandfather     High Blood Pressure Maternal Grandfather     Heart Disease Paternal Grandmother     Stroke Paternal Grandfather     Depression Sister     Cirrhosis Father     Rheum Arthritis Neg Hx     Osteoarthritis Neg Hx     Asthma Neg Hx     Breast Cancer Neg Hx     Cancer Neg Hx     Heart Failure Neg Hx     Hypertension Neg Hx     Migraines Neg Hx     Ovarian Cancer Neg Hx     Rashes/Skin Problems Neg Hx     Seizures Neg Hx     Thyroid Disease Neg Hx           SOCIAL HISTORY       Social History     Socioeconomic History    Marital status: Single     Spouse name: None    Number of children: None    Years of education: None    Highest education level: None   Occupational History    Occupation: disability, SSI    Social Needs    Financial resource strain: None    Food insecurity     Worry: None     Inability: None    Transportation needs     Medical: None     Non-medical: None   Tobacco Use    Smoking status: Current Every Day Smoker     Packs/day: 0.50     Years: 18.00     Pack years: 9.00     Types: Cigarettes  Smokeless tobacco: Never Used   Substance and Sexual Activity    Alcohol use: No     Alcohol/week: 0.0 standard drinks    Drug use: No    Sexual activity: Not Currently     Partners: Male   Lifestyle    Physical activity     Days per week: None     Minutes per session: None    Stress: None   Relationships    Social connections     Talks on phone: None     Gets together: None     Attends Baptism service: None     Active member of club or organization: None     Attends meetings of clubs or organizations: None     Relationship status: None    Intimate partner violence     Fear of current or ex partner: None     Emotionally abused: None     Physically abused: None     Forced sexual activity: None   Other Topics Concern    None   Social History Narrative    None       SCREENINGS    [place] Coma Scale  Eye Opening: Spontaneous  Best Verbal Response: Oriented  Best Motor Response: Obeys commands  Aden Coma Scale Score: 15        PHYSICAL EXAM    (up to 7 for level 4, 8 ormore for level 5)     ED Triage Vitals [05/08/20 1940]   BP Temp Temp Source Pulse Resp SpO2 Height Weight   (!) 131/105 99.9 °F (37.7 °C) Oral 116 22 99 % 4' 11\" (1.499 m) 150 lb (68 kg)       Physical Exam  Constitutional:       General: She is not in acute distress. Appearance: Normal appearance. She is well-developed. She is not ill-appearing or toxic-appearing. Comments: Sitting in bed comfortably, speaking in full sentences, following verbal commands appropriately. Not in acute distress     HENT:      Head: Normocephalic and atraumatic. Eyes:      Conjunctiva/sclera: Conjunctivae normal.      Pupils: Pupils are equal, round, and reactive to light. Neck:      Musculoskeletal: Normal range of motion and neck supple. Pain with movement present. Meningeal: Brudzinski's sign and Kernig's sign absent. Cardiovascular:      Rate and Rhythm: Regular rhythm. Tachycardia present. Heart sounds: Normal heart sounds.  No Zeb Gandhi MD  05/08/20 1556

## 2020-05-10 ENCOUNTER — CARE COORDINATION (OUTPATIENT)
Dept: CARE COORDINATION | Age: 40
End: 2020-05-10

## 2020-05-10 NOTE — CARE COORDINATION
Attempted outreach call for ED follow-up and COVID-19 monitoring; left a VM with ACM call-back information. If no call back, will try again tomorrow. Future Appointments   Date Time Provider Claudia Mac   6/4/2020 10:30 AM SCHEDULE, [facility] EMG ROOM [facility]       Kofi Henley MSN, RN  Ambulatory Care Manager  [phone]  [email or web address]

## 2020-05-14 ENCOUNTER — CARE COORDINATION (OUTPATIENT)
Dept: CARE COORDINATION | Age: 40
End: 2020-05-14

## 2020-05-14 NOTE — CARE COORDINATION
Patient contacted regarding COVID-19 risk and screening. She states that she is doing \"about the same. \" She continues to have abdominal pain, N/V/D, and headaches. Her temperature was 100.1 yesterday evening. She states that the vomiting usually only happens at night. She states that she is drinking water to stay hydrated; ACM recommended also drinking Gatorade or something to replenish her electrolytes as well. She denies any new or worsening symptoms. She is looking forward to seeing the surgeon on . She states that if her symptoms worsen, she will go back to the ED. Denies any respiratory symptoms at this time. Care Transition Nurse/ Ambulatory Care Manager contacted the patient by telephone to perform follow-up assessment. Verified name and  with patient as identifiers. Patient has following risk factors of: no known risk factors. Symptoms reviewed with patient who verbalized the following symptoms: nausea, vomiting, diarrhea and no worsening symptoms. Due to no new or worsening symptoms encounter was not routed to provider for escalation. Education provided regarding infection prevention, and signs and symptoms of COVID-19 and when to seek medical attention with patient who verbalized understanding. Discussed exposure protocols and quarantine from [address] you at higher risk for severe illness  and given an opportunity for questions and concerns. The patient agrees to contact the COVID-19 hotline [phone] or PCP office for questions related to their healthcare. CTN/ACM provided contact information for future reference. From CDC: Are you at higher risk for severe illness?  Wash your hands often.  Avoid close contact (6 feet, which is about two arm lengths) with people who are sick.  Put distance between yourself and other people if COVID-19 is spreading in your community.  Clean and disinfect frequently touched surfaces.    Avoid all cruise travel and

## 2020-05-18 ENCOUNTER — CARE COORDINATION (OUTPATIENT)
Dept: CARE COORDINATION | Age: 40
End: 2020-05-18

## 2020-05-18 NOTE — CARE COORDINATION
Patient contacted regarding Roxana Meza. She states that she is doing about the same; no new or worsening symptoms. She is seeing the Surgeon in 2 days. Care Transition Nurse/ Ambulatory Care Manager contacted the patient by telephone to perform post discharge assessment. Verified name and  with patient as identifiers. Provided introduction to self, and explanation of the CTN/ACM role, and reason for call due to risk factors for infection and/or exposure to COVID-19. Symptoms reviewed with patient who verbalized the following symptoms: no new symptoms and no worsening symptoms. Due to no new or worsening symptoms encounter was not routed to provider for escalation. Patient has following risk factors of: no known risk factors. CTN/ACM reviewed discharge instructions, medical action plan and red flags such as increased shortness of breath, increasing fever and signs of decompensation with patient who verbalized understanding. Discussed exposure protocols and quarantine with CDC Guidelines What to do if you are sick with coronavirus disease .  Patient was given an opportunity for questions and concerns. The patient agrees to contact the [facility] exposure line [phone], [facility] department [place] Department of Health: ([phone]) and PCP office for questions related to their healthcare. CTN/ACM provided contact information for future needs. Reviewed and educated patient on any new and changed medications related to discharge diagnosis     Patient/family/caregiver given information for GetWell Loop and agrees to enroll no    Plan for follow-up call in 3-5 days based on severity of symptoms and risk factors. Future Appointments   Date Time Provider Claudia Mac   2020 10:30 AM SCHEDULE, [facility] EMG ROOM [facility]       Leann EDMONDSON, RN  Ambulatory Care Manager  [phone]  [email or web address]

## 2020-05-22 ENCOUNTER — CARE COORDINATION (OUTPATIENT)
Dept: CARE COORDINATION | Age: 40
End: 2020-05-22

## 2020-05-22 NOTE — CARE COORDINATION
You Patient resolved from the Care Transitions episode on 5/22/20  Patient/family has been provided the following resources and education related to COVID-19:                         Signs, symptoms and red flags related to COVID-19            CDC exposure and quarantine guidelines            [facility] exposure contact - [phone]            Contact for their local Department of Health                 Patient currently reports that the following symptoms have improved:  no new/worsening symptoms ; she saw the Surgeon on Wednesday and is scheduled for surgery on 6/1. She states that the surgeon felt that her symptoms were consistent with the shunt being out of place and/or a hernia. She has had no new or worsening symptoms since her ED visit. No further outreach scheduled with this CTN/ACM. Episode of Care resolved. Patient has this CTN/ACM contact information if future needs arise. Future Appointments   Date Time Provider Claudia Mac   6/4/2020 10:30 AM SCHEDULE, [facility] EMG ROOM [facility]       Cruz EDMONDSON, RN  Ambulatory Care Manager  [phone]  [email or web address]

## 2020-06-03 ENCOUNTER — HOSPITAL ENCOUNTER (EMERGENCY)
Age: 40
Discharge: HOME | End: 2020-06-03
Attending: EMERGENCY MEDICINE
Payer: COMMERCIAL

## 2020-06-03 VITALS
OXYGEN SATURATION: 99 % | DIASTOLIC BLOOD PRESSURE: 76 MMHG | BODY MASS INDEX: 34.44 KG/M2 | RESPIRATION RATE: 16 BRPM | SYSTOLIC BLOOD PRESSURE: 118 MMHG | HEART RATE: 74 BPM | TEMPERATURE: 98.1 F | WEIGHT: 170.86 LBS | HEIGHT: 59 IN

## 2020-06-03 PROCEDURE — 6370000000 HC RX 637 (ALT 250 FOR IP): Performed by: EMERGENCY MEDICINE

## 2020-06-03 PROCEDURE — 99282 EMERGENCY DEPT VISIT SF MDM: CPT

## 2020-06-03 RX ORDER — OXYCODONE HYDROCHLORIDE AND ACETAMINOPHEN 5; 325 MG/1; MG/1
1 TABLET ORAL EVERY 6 HOURS PRN
Qty: 12 TABLET | Refills: 0 | Status: SHIPPED | OUTPATIENT
Start: 2020-06-03 | End: 2020-06-06

## 2020-06-03 RX ORDER — OXYCODONE HYDROCHLORIDE AND ACETAMINOPHEN 5; 325 MG/1; MG/1
1 TABLET ORAL ONCE
Status: COMPLETED | OUTPATIENT
Start: 2020-06-03 | End: 2020-06-03

## 2020-06-03 RX ADMIN — OXYCODONE HYDROCHLORIDE AND ACETAMINOPHEN 1 TABLET: 5; 325 TABLET ORAL at 16:08

## 2020-06-03 ASSESSMENT — PAIN DESCRIPTION - ORIENTATION
ORIENTATION: LEFT
ORIENTATION: LEFT

## 2020-06-03 ASSESSMENT — PAIN SCALES - GENERAL
PAINLEVEL_OUTOF10: 10
PAINLEVEL_OUTOF10: 10
PAINLEVEL_OUTOF10: 2
PAINLEVEL_OUTOF10: 10

## 2020-06-03 ASSESSMENT — PAIN DESCRIPTION - LOCATION
LOCATION: ABDOMEN
LOCATION: ABDOMEN

## 2020-06-03 ASSESSMENT — PAIN DESCRIPTION - FREQUENCY: FREQUENCY: CONTINUOUS

## 2020-06-03 ASSESSMENT — PAIN DESCRIPTION - ONSET: ONSET: ON-GOING

## 2020-06-03 ASSESSMENT — PAIN DESCRIPTION - PROGRESSION: CLINICAL_PROGRESSION: NOT CHANGED

## 2020-06-03 ASSESSMENT — PAIN DESCRIPTION - DESCRIPTORS
DESCRIPTORS: PRESSURE
DESCRIPTORS: PRESSURE

## 2020-06-03 ASSESSMENT — PAIN - FUNCTIONAL ASSESSMENT: PAIN_FUNCTIONAL_ASSESSMENT: PREVENTS OR INTERFERES SOME ACTIVE ACTIVITIES AND ADLS

## 2020-06-03 ASSESSMENT — PAIN DESCRIPTION - PAIN TYPE
TYPE: ACUTE PAIN
TYPE: ACUTE PAIN

## 2020-06-03 NOTE — ED PROVIDER NOTES
eMERGENCY dEPARTMENT eNCOUnter      Pt Name: Linda Grimes  MRN: [id number]  [place] 1980  Date of evaluation: 6/3/2020  Provider: Jarod Alexander MD     [address] [place] [address]       Chief Complaint   Patient presents with    Post-op Problem     Pt states she had hernia fixed yesterday, states today the glue came off of site and is painful         HISTORY OF PRESENT ILLNESS   (Location/Symptom, Timing/Onset,Context/Setting, Quality, Duration, Modifying Factors, Severity) Note limiting factors. HPI    Linda Grimes is a 42 y.o. female who presents to the emergency department with abdominal pain related to abdominal wall hernia repair. Patient was discharged yesterday on tramadol. Patient states she coughs and movement makes the pain worse. Patient states that the [facility] pain on started opening up but there is no dehiscence. Patient has no fever. Patient is holding to the left side because it radiates to the left chest wall. Denies any fever. She has no cough. There is no shortness of breath. Nursing Notes were reviewed. REVIEW OFSYSTEMS    (2+ for level 4; 10+ for level 5)   Review of Systems    General: No fevers, chills or night sweats, No weight loss    Head:  No Sore throat,  No Ear Pain    Chest:  Nontender. No Cough, No SOB,  Chest Pain    GI: abdominal wall pain without vomiting    : No dysuria or hematuria    Musculoskeletal: No unrelenting pain or night pain    Neurologic: No bowel or bladder incontinence, No saddle anesthesia, No leg weakness    All other systems reviewed and are negative.         PAST MEDICAL HISTORY     Past Medical History:   Diagnosis Date    ADHD (attention deficit hyperactivity disorder) 82    Anesthesia complication     pt states with general anesthesia, she gets very nauseated and wakes up with a migrain     Depression with anxiety     Difficult intubation     ESBL (extended spectrum beta-lactamase) producing bacteria infection 11/06/2019    urine    Functional ovarian cysts 2008    rt ovary cyst x 2 yrs.  Headache(784.0)     migraines    Hiatal hernia     History of blood transfusion     History of kidney stones     History of PCOS     Hydrocephalus (HCC)     Irritable bowel syndrome     had colonoscopy about 6 yrs ago    Kidney stone     Meningitis     Neutrophilic leukocytosis     Nicotine dependence     Primary osteoarthritis of left knee 2016    S/P cone biopsy of cervix 2004    Scoliosis .s     (ventriculoperitoneal) shunt status     hydrocephalus f/w Neurosurgeon at [place] [address] Wears glasses     reading       SURGICAL HISTORY       Past Surgical History:   Procedure Laterality Date    APPENDECTOMY  2003    appendicitis     SECTION      placenta previa    CHOLECYSTECTOMY      COLONOSCOPY  2004    COLPOSCOPY      CSF SHUNT      replaced at age 17   [facility] CYSTOSCOPY      stone removal   [address] [facility]    inguinal    HYSTERECTOMY, TOTAL ABDOMINAL  2016    TAHBSO, adhesions/PCOS    KNEE ARTHROSCOPY Left 2015    medial meniscectomy, chondroplasty, plica resection    LITHOTRIPSY Bilateral 12/10/2019    OTHER SURGICAL HISTORY  10/19/2016    op lap    VENTRICULOPERITONEAL SHUNT      multiple revisions, most recent        CURRENT MEDICATIONS       Discharge Medication List as of 6/3/2020  5:03 PM      CONTINUE these medications which have NOT CHANGED    Details   ondansetron (ZOFRAN) 4 MG tablet Take 1 tablet by mouth every 8 hours as needed for Nausea, Disp-20 tablet, R-0Normal             ALLERGIES     Bentyl [dicyclomine hcl]; Mushroom extract complex; Sulfa antibiotics; Vancomycin; Adhesive tape; Bee venom; Hydrocodone; Levaquin [levofloxacin]; Toradol [ketorolac tromethamine]; Ceftaroline; Daptomycin; Dicyclomine; Fioricet-codeine [butalbital-apap-caff-cod];  Prochlorperazine; and Zosyn [piperacillin sod-tazobactam so]    FAMILY HISTORY       Family History   Problem Relation Age of Onset    High Blood Pressure Mother     Diabetes Mother     High Cholesterol Mother     Depression Mother     Diabetes Maternal Grandmother     High Blood Pressure Maternal Grandmother     High Cholesterol Maternal Grandmother     Heart Disease Maternal Grandfather     High Blood Pressure Maternal Grandfather     Heart Disease Paternal Grandmother     Stroke Paternal Grandfather     Depression Sister     Cirrhosis Father     Rheum Arthritis Neg Hx     Osteoarthritis Neg Hx     Asthma Neg Hx     Breast Cancer Neg Hx     Cancer Neg Hx     Heart Failure Neg Hx     Hypertension Neg Hx     Migraines Neg Hx     Ovarian Cancer Neg Hx     Rashes/Skin Problems Neg Hx     Seizures Neg Hx     Thyroid Disease Neg Hx         SOCIAL HISTORY       Social History     Socioeconomic History    Marital status: Single     Spouse name: None    Number of children: None    Years of education: None    Highest education level: None   Occupational History    Occupation: disability, SSI    Social Needs    Financial resource strain: None    Food insecurity     Worry: None     Inability: None    Transportation needs     Medical: None     Non-medical: None   Tobacco Use    Smoking status: Current Every Day Smoker     Packs/day: 0.50     Years: 18.00     Pack years: 9.00     Types: Cigarettes    Smokeless tobacco: Never Used   Substance and Sexual Activity    Alcohol use: No     Alcohol/week: 0.0 standard drinks    Drug use: No    Sexual activity: Not Currently     Partners: Male   Lifestyle    Physical activity     Days per week: None     Minutes per session: None    Stress: None   Relationships    Social connections     Talks on phone: None     Gets together: None     Attends Sabianism service: None     Active member of club or organization: None     Attends meetings of clubs or organizations: None     Relationship status: None    Intimate partner violence     Fear of current or ex partner: None 1704   BP: 114/80  122/79 118/76   Pulse: 75  78 74   Resp: 18  16 16   Temp: 98.1 °F (36.7 °C)  98.3 °F (36.8 °C) 98.1 °F (36.7 °C)   TempSrc: Temporal  Oral Oral   SpO2: 99%  98% 99%   Weight: 170 lb 13.7 oz (77.5 kg)      Height:  4' 11\" (1.499 m)         Medications   oxyCODONE-acetaminophen (PERCOCET) 5-325 MG per tablet 1 tablet (1 tablet Oral Given 6/3/20 1608)       MDM. Patient is a 42-year-old recent abdominal surgery for hernia repair discharged yesterday presents with abdominal pain. She was placed on Percocets. We will give her some Percocets at home in addition to the tramadol that she is on should help her sleep for the next couple days. She is okay with the plan. Abdomen exam is pretty benign. REVAL:         CRITICAL CARE TIME   Total CriticalCare time was 0 minutes, excluding separately reportable procedures. There was a high probability of clinically significant/life threatening deterioration in the patient's condition which required my urgent intervention. CONSULTS:  None    PROCEDURES:  Unless otherwise noted below, none     [unfilled]    FINAL IMPRESSION      1. Abdominal wall pain          DISPOSITION/PLAN   DISPOSITION        PATIENT REFERRED TO:  General surgery    Schedule an appointment as soon as possible for a visit in 1 week  If symptoms worsen      DISCHARGE MEDICATIONS:  Discharge Medication List as of 6/3/2020  5:03 PM      START taking these medications    Details   oxyCODONE-acetaminophen (PERCOCET) 5-325 MG per tablet Take 1 tablet by mouth every 6 hours as needed for Pain for up to 3 days. Intended supply: 3 days. Take lowest dose possible to manage pain, Disp-12 tablet, R-0Print                (Please note:  Portions of this note were completed with a voice recognition program.Efforts were made to edit the dictations but occasionally words and phrases are mis-transcribed.)  Form v2016. J.5-cn    EDMUNDO VELAZQUEZ MD (electronically signed)  Emergency Medicine

## 2020-06-03 NOTE — ED TRIAGE NOTES
Pt to ED with c/o pain to her surgical site. Pt states she had hernia fixed yesterday, states today the glue came off of site and is painful. Incision intact upon assessment, no signs of infection to incision or surrounding skin upon ED assessment.

## 2020-06-15 RX ORDER — CLINDAMYCIN HYDROCHLORIDE 300 MG/1
300 CAPSULE ORAL 2 TIMES DAILY
Qty: 10 CAPSULE | Refills: 0 | Status: SHIPPED | OUTPATIENT
Start: 2020-06-15 | End: 2020-06-20

## 2020-06-26 RX ORDER — PROMETHAZINE HYDROCHLORIDE 25 MG/1
25 TABLET ORAL EVERY 8 HOURS PRN
Qty: 9 TABLET | Refills: 0 | Status: SHIPPED | OUTPATIENT
Start: 2020-06-26 | End: 2020-06-29

## 2020-06-29 ENCOUNTER — APPOINTMENT (OUTPATIENT)
Dept: CT IMAGING | Age: 40
End: 2020-06-29
Payer: COMMERCIAL

## 2020-06-29 ENCOUNTER — TELEPHONE (OUTPATIENT)
Dept: INTERNAL MEDICINE CLINIC | Age: 40
End: 2020-06-29

## 2020-06-29 ENCOUNTER — HOSPITAL ENCOUNTER (EMERGENCY)
Age: 40
Discharge: HOME | End: 2020-06-29
Payer: COMMERCIAL

## 2020-06-29 VITALS
DIASTOLIC BLOOD PRESSURE: 85 MMHG | HEART RATE: 72 BPM | OXYGEN SATURATION: 100 % | SYSTOLIC BLOOD PRESSURE: 109 MMHG | RESPIRATION RATE: 16 BRPM | WEIGHT: 170.19 LBS | BODY MASS INDEX: 34.38 KG/M2 | TEMPERATURE: 99 F

## 2020-06-29 LAB
A/G RATIO: 1.2 (ref 1.1–2.2)
ALBUMIN SERPL-MCNC: 4.1 G/DL (ref 3.4–5)
ALP BLD-CCNC: 109 U/L (ref 40–129)
ALT SERPL-CCNC: 23 U/L (ref 10–40)
ANION GAP SERPL CALCULATED.3IONS-SCNC: 17 MMOL/L (ref 3–16)
AST SERPL-CCNC: 16 U/L (ref 15–37)
BASOPHILS ABSOLUTE: 0 K/UL (ref 0–0.2)
BASOPHILS RELATIVE PERCENT: 0.5 %
BILIRUB SERPL-MCNC: <0.2 MG/DL (ref 0–1)
BILIRUBIN URINE: NEGATIVE
BLOOD, URINE: NEGATIVE
BUN BLDV-MCNC: 11 MG/DL (ref 7–20)
CALCIUM SERPL-MCNC: 9.5 MG/DL (ref 8.3–10.6)
CHLORIDE BLD-SCNC: 103 MMOL/L (ref 99–110)
CLARITY: ABNORMAL
CO2: 18 MMOL/L (ref 21–32)
COLOR: YELLOW
CREAT SERPL-MCNC: 0.7 MG/DL (ref 0.6–1.1)
EOSINOPHILS ABSOLUTE: 0.2 K/UL (ref 0–0.6)
EOSINOPHILS RELATIVE PERCENT: 3 %
EPITHELIAL CELLS, UA: 9 /HPF (ref 0–5)
GFR AFRICAN AMERICAN: >60
GFR NON-AFRICAN AMERICAN: >60
GLOBULIN: 3.4 G/DL
GLUCOSE BLD-MCNC: 123 MG/DL (ref 70–99)
GLUCOSE URINE: NEGATIVE MG/DL
HCG QUALITATIVE: NEGATIVE
HCT VFR BLD CALC: 45.8 % (ref 36–48)
HEMOGLOBIN: 15.2 G/DL (ref 12–16)
HYALINE CASTS: 3 /LPF (ref 0–8)
KETONES, URINE: NEGATIVE MG/DL
LEUKOCYTE ESTERASE, URINE: NEGATIVE
LIPASE: 22 U/L (ref 13–60)
LYMPHOCYTES ABSOLUTE: 3.4 K/UL (ref 1–5.1)
LYMPHOCYTES RELATIVE PERCENT: 41.7 %
MCH RBC QN AUTO: 29.4 PG (ref 26–34)
MCHC RBC AUTO-ENTMCNC: 33.1 G/DL (ref 31–36)
MCV RBC AUTO: 88.8 FL (ref 80–100)
MICROSCOPIC EXAMINATION: YES
MONOCYTES ABSOLUTE: 0.4 K/UL (ref 0–1.3)
MONOCYTES RELATIVE PERCENT: 4.9 %
NEUTROPHILS ABSOLUTE: 4.1 K/UL (ref 1.7–7.7)
NEUTROPHILS RELATIVE PERCENT: 49.9 %
NITRITE, URINE: NEGATIVE
PDW BLD-RTO: 13.4 % (ref 12.4–15.4)
PH UA: 6 (ref 5–8)
PLATELET # BLD: 234 K/UL (ref 135–450)
PMV BLD AUTO: 7.3 FL (ref 5–10.5)
POTASSIUM REFLEX MAGNESIUM: 3.9 MMOL/L (ref 3.5–5.1)
PROTEIN UA: NEGATIVE MG/DL
RBC # BLD: 5.16 M/UL (ref 4–5.2)
RBC UA: 1 /HPF (ref 0–4)
SODIUM BLD-SCNC: 138 MMOL/L (ref 136–145)
SPECIFIC GRAVITY UA: 1.01 (ref 1–1.03)
TOTAL PROTEIN: 7.5 G/DL (ref 6.4–8.2)
URINE REFLEX TO CULTURE: ABNORMAL
URINE TYPE: ABNORMAL
UROBILINOGEN, URINE: 0.2 E.U./DL
WBC # BLD: 8.2 K/UL (ref 4–11)
WBC UA: 3 /HPF (ref 0–5)

## 2020-06-29 PROCEDURE — 96374 THER/PROPH/DIAG INJ IV PUSH: CPT

## 2020-06-29 PROCEDURE — 85025 COMPLETE CBC W/AUTO DIFF WBC: CPT

## 2020-06-29 PROCEDURE — 80053 COMPREHEN METABOLIC PANEL: CPT

## 2020-06-29 PROCEDURE — 83690 ASSAY OF LIPASE: CPT

## 2020-06-29 PROCEDURE — 74176 CT ABD & PELVIS W/O CONTRAST: CPT

## 2020-06-29 PROCEDURE — 96375 TX/PRO/DX INJ NEW DRUG ADDON: CPT

## 2020-06-29 PROCEDURE — 81001 URINALYSIS AUTO W/SCOPE: CPT

## 2020-06-29 PROCEDURE — 6360000002 HC RX W HCPCS: Performed by: PHYSICIAN ASSISTANT

## 2020-06-29 PROCEDURE — 84703 CHORIONIC GONADOTROPIN ASSAY: CPT

## 2020-06-29 PROCEDURE — 2500000003 HC RX 250 WO HCPCS: Performed by: PHYSICIAN ASSISTANT

## 2020-06-29 PROCEDURE — 99284 EMERGENCY DEPT VISIT MOD MDM: CPT

## 2020-06-29 PROCEDURE — 2580000003 HC RX 258: Performed by: PHYSICIAN ASSISTANT

## 2020-06-29 RX ORDER — FAMOTIDINE 10 MG
20 TABLET ORAL 2 TIMES DAILY
Qty: 120 TABLET | Refills: 0 | Status: SHIPPED | OUTPATIENT
Start: 2020-06-29 | End: 2021-06-17

## 2020-06-29 RX ORDER — MORPHINE SULFATE 2 MG/ML
2 INJECTION, SOLUTION INTRAMUSCULAR; INTRAVENOUS ONCE
Status: COMPLETED | OUTPATIENT
Start: 2020-06-29 | End: 2020-06-29

## 2020-06-29 RX ORDER — ONDANSETRON 4 MG/1
4 TABLET, ORALLY DISINTEGRATING ORAL EVERY 8 HOURS PRN
Qty: 20 TABLET | Refills: 0 | Status: SHIPPED | OUTPATIENT
Start: 2020-06-29 | End: 2020-07-06

## 2020-06-29 RX ORDER — ONDANSETRON 2 MG/ML
4 INJECTION INTRAMUSCULAR; INTRAVENOUS ONCE
Status: COMPLETED | OUTPATIENT
Start: 2020-06-29 | End: 2020-06-29

## 2020-06-29 RX ORDER — 0.9 % SODIUM CHLORIDE 0.9 %
1000 INTRAVENOUS SOLUTION INTRAVENOUS ONCE
Status: COMPLETED | OUTPATIENT
Start: 2020-06-29 | End: 2020-06-29

## 2020-06-29 RX ADMIN — ONDANSETRON 4 MG: 2 INJECTION INTRAMUSCULAR; INTRAVENOUS at 22:38

## 2020-06-29 RX ADMIN — FAMOTIDINE 20 MG: 10 INJECTION, SOLUTION INTRAVENOUS at 22:38

## 2020-06-29 RX ADMIN — MORPHINE SULFATE 2 MG: 2 INJECTION, SOLUTION INTRAMUSCULAR; INTRAVENOUS at 22:38

## 2020-06-29 RX ADMIN — SODIUM CHLORIDE 1000 ML: 9 INJECTION, SOLUTION INTRAVENOUS at 22:38

## 2020-06-29 ASSESSMENT — ENCOUNTER SYMPTOMS
CHEST TIGHTNESS: 0
CHOKING: 0
CONSTIPATION: 0
SHORTNESS OF BREATH: 0
DIARRHEA: 0
NAUSEA: 1
ABDOMINAL PAIN: 1
VOMITING: 1
BLOOD IN STOOL: 0

## 2020-06-29 ASSESSMENT — PAIN SCALES - GENERAL
PAINLEVEL_OUTOF10: 8
PAINLEVEL_OUTOF10: 5
PAINLEVEL_OUTOF10: 8
PAINLEVEL_OUTOF10: 4

## 2020-06-29 ASSESSMENT — PAIN DESCRIPTION - PAIN TYPE: TYPE: CHRONIC PAIN

## 2020-06-29 ASSESSMENT — PAIN DESCRIPTION - ORIENTATION: ORIENTATION: RIGHT

## 2020-06-29 ASSESSMENT — PAIN DESCRIPTION - DESCRIPTORS: DESCRIPTORS: ACHING;CONSTANT

## 2020-06-29 ASSESSMENT — PAIN DESCRIPTION - PROGRESSION: CLINICAL_PROGRESSION: GRADUALLY WORSENING

## 2020-06-29 ASSESSMENT — PAIN DESCRIPTION - FREQUENCY: FREQUENCY: CONTINUOUS

## 2020-06-29 ASSESSMENT — PAIN DESCRIPTION - LOCATION: LOCATION: FLANK

## 2020-06-29 ASSESSMENT — PAIN SCALES - WONG BAKER: WONGBAKER_NUMERICALRESPONSE: 8

## 2020-06-29 ASSESSMENT — PAIN DESCRIPTION - ONSET: ONSET: PROGRESSIVE

## 2020-06-30 ENCOUNTER — CARE COORDINATION (OUTPATIENT)
Dept: CARE COORDINATION | Age: 40
End: 2020-06-30

## 2020-06-30 NOTE — CARE COORDINATION
patient on any new and changed medications related to discharge diagnosis     Patient/family/caregiver given information for GetWell Loop and agrees to enroll yes  Patient's preferred e-mail: [email or web address]  Patient's preferred phone number: see chart  Based on Loop alert triggers, patient will be contacted by nurse care manager for worsening symptoms. Plan for follow-up call in 3-5 days based on severity of symptoms and risk factors. Emailed additional resources for follow up.  [employer], patient hotline, and [facility] ([facility]) information '

## 2020-06-30 NOTE — ED NOTES
Pt arrived to the ED via private car, pt states that she has had abdominal pain, pt states that her pain is a 8/10 on pain scale, pt is alert and orient, vss, jaki Cano RN  06/29/20 2055       Greer Cano RN  06/29/20 2055

## 2020-06-30 NOTE — ED PROVIDER NOTES
[address] [facility]        Pt Name: Tania Harada  MRN: [id number]  [place] 1980  Date of evaluation: 6/29/2020  Provider: CRISPIN Hernandez  PCP: FREDRICK Gurrola - CNP    Evaluation by ALDAIR. My supervising physician was available for consultation. CHIEF COMPLAINT       Chief Complaint   Patient presents with    Cough    Flank Pain    Abdominal Pain     right sided lower abdominal pain with radiation to right flank. Also left upper quadrant pain with movement/cough at surgical incision site. Patient recent hernia repair. Also complains of emesis       HISTORY OF PRESENT ILLNESS   (Location, Timing/Onset, Context/Setting, Quality, Duration, Modifying Factors, Severity, Associated Signs and Symptoms)  Note limiting factors. Tania Harada is a 42 y.o. female presents to the ER today with complaints of cough, left flank pain, and abdominal pain. Pt reports that the symptoms have been present for about a week. She notes she has also had nausea and vomiting. She was called in a prescription for zofran, but states it was not the dissolvable tablet so she was not able to take it. She had a hernia repaired to her left abdomen in early June, and states that recovery has gone reasonably well. She states this pain feels like a kidney stone, as she has had those in the past. She knows she has had a lot of CT scans in the past, but would feel better if she had one done to make sure everything was ok in her abdomen. She states her biggest concern is the abdominal pain, N/V, and less so the cough - she thinks it could be from the vomiting. She has no fever, chills, chest pain, diarrhea, constipation. She has no further complaints at this time. Nursing Notes were all reviewed and agreed with or any disagreements were addressed in the HPI.     REVIEW OF SYSTEMS    (2-9 systems for level 4, 10 or more for level 5)     Review of Systems   Constitutional: Negative for chills and fever. Respiratory: Negative for choking, chest tightness and shortness of breath. Cardiovascular: Negative for chest pain and palpitations. Gastrointestinal: Positive for abdominal pain, nausea and vomiting. Negative for blood in stool, constipation and diarrhea. Genitourinary: Positive for flank pain. Negative for dysuria, frequency and urgency. Musculoskeletal: Negative for arthralgias and myalgias. Neurological: Negative for dizziness, weakness, light-headedness and numbness. Positives and Pertinent negatives as per HPI. Except as noted above in the ROS, all other systems were reviewed and negative. PAST MEDICAL HISTORY     Past Medical History:   Diagnosis Date    ADHD (attention deficit hyperactivity disorder) 82    Anesthesia complication     pt states with general anesthesia, she gets very nauseated and wakes up with a migrain     Depression with anxiety     Difficult intubation     ESBL (extended spectrum beta-lactamase) producing bacteria infection 2019    urine    Functional ovarian cysts 2008    rt ovary cyst x 2 yrs.     Headache(784.0)     migraines    Hiatal hernia     History of blood transfusion     History of kidney stones     History of PCOS     Hydrocephalus (HCC)     Irritable bowel syndrome 2004    had colonoscopy about 6 yrs ago    Kidney stone     Meningitis     Neutrophilic leukocytosis     Nicotine dependence     Primary osteoarthritis of left knee 2016    S/P cone biopsy of cervix 2004    Scoliosis .s     (ventriculoperitoneal) shunt status     hydrocephalus f/w Neurosurgeon at [place] [phone] Wears glasses     reading         SURGICAL HISTORY     Past Surgical History:   Procedure Laterality Date    APPENDECTOMY  2003    appendicitis     SECTION      placenta previa    CHOLECYSTECTOMY      COLONOSCOPY  2004    COLPOSCOPY      CSF SHUNT      replaced at [facility]       Social History     Tobacco Use    Smoking status: Current Every Day Smoker     Packs/day: 0.50     Years: 18.00     Pack years: 9.00     Types: Cigarettes    Smokeless tobacco: Never Used   Substance Use Topics    Alcohol use: No     Alcohol/week: 0.0 standard drinks    Drug use: No       SCREENINGS    Aden Coma Scale  Eye Opening: Spontaneous  Best Verbal Response: Oriented  Best Motor Response: Obeys commands  Aden Coma Scale Score: 15        PHYSICAL EXAM    (up to 7 for level 4, 8 or more for level 5)     ED Triage Vitals [06/29/20 1859]   BP Temp Temp Source Pulse Resp SpO2 Height Weight   132/77 99 °F (37.2 °C) Oral 78 18 99 % -- 170 lb 3.1 oz (77.2 kg)       Physical Exam  Vitals signs and nursing note reviewed. Constitutional:       General: She is not in acute distress. Appearance: She is well-developed. She is not ill-appearing, toxic-appearing or diaphoretic. HENT:      Head: Normocephalic and atraumatic. Eyes:      Conjunctiva/sclera: Conjunctivae normal.      Pupils: Pupils are equal, round, and reactive to light. Neck:      Musculoskeletal: Normal range of motion and neck supple. Cardiovascular:      Rate and Rhythm: Normal rate and regular rhythm. Pulses: Normal pulses. Pulmonary:      Effort: Pulmonary effort is normal. No respiratory distress. Breath sounds: Normal breath sounds. Abdominal:      General: Bowel sounds are normal. There is no distension. Palpations: Abdomen is soft. There is no mass. Tenderness: There is abdominal tenderness (Left sided). There is no right CVA tenderness, left CVA tenderness or guarding. Hernia: No hernia is present. Comments: Surgical scar to left upper abdomen with no dehisence or discharge noted, well healing   Musculoskeletal:      Right lower leg: No edema. Left lower leg: No edema. Skin:     General: Skin is warm and dry. Neurological:      General: No focal deficit present. interpretation of EKG. RADIOLOGY:   Non-plain film images such as CT, Ultrasound and MRI are read by the radiologist. Plain radiographic images are visualized and preliminarily interpreted by the ED Provider with the below findings:    Interpretation per the Radiologist below, if available at the time of this note:    CT ABDOMEN PELVIS WO CONTRAST   Final Result   1. Nonobstructing 3 mm right intrarenal calculus, and punctate left   intrarenal calculus   2. Scattered colonic diverticula, without evidence of diverticulitis   3. No acute findings within the abdomen or pelvis           Ct Abdomen Pelvis Wo Contrast    Result Date: 6/29/2020  EXAMINATION: CT OF THE ABDOMEN AND PELVIS WITHOUT CONTRAST 6/29/2020 10:19 pm TECHNIQUE: CT of the abdomen and pelvis was performed without the administration of intravenous contrast. Multiplanar reformatted images are provided for review. Dose modulation, iterative reconstruction, and/or weight based adjustment of the mA/kV was utilized to reduce the radiation dose to as low as reasonably achievable. COMPARISON: April 15, 2020, 8 May 2020 HISTORY: ORDERING SYSTEM PROVIDED HISTORY: abdominal pain, flank pain, recent hernia repain 6/2 TECHNOLOGIST PROVIDED HISTORY: If patient is on cardiac monitor and/or pulse ox, they may be taken off cardiac monitor and pulse ox, left on O2 if currently on. All monitors reattached when patient returns to room. Reason for exam:->abdominal pain, flank pain, recent hernia repain 6/2 Is the patient pregnant?->No Reason for Exam: abdominal pain, flank pain, recent hernia repain 6/2 Acuity: Acute Type of Exam: Initial FINDINGS: Lower Chest: Dependent changes are seen within the left lung base. Organs: The liver, spleen, adrenal glands, and pancreas are normal.  The gallbladder is surgically absent. Nonobstructing 3 mm calculus is seen within the lower pole collecting system of the right kidney.   Punctate calculus is seen within the lower pole collecting system of the left kidney, best seen on the coronal images. No hydronephrosis is present. GI/Bowel: Scattered colonic diverticula are noted, without evidence of diverticulitis. Small bowel appears normal, without evidence of obstruction or inflammation. A small hiatal hernia is present. Pelvis: Urinary bladder is normal.  Uterus is surgically absent. No adnexal masses are present. Peritoneum/Retroperitoneum: A ventricular shunt catheter is again noted in place, tip position within the left upper abdomen. No aneurysm formation is noted. Scattered mesenteric lymph nodes are again noted, nonspecific. Bones/Soft Tissues: Postsurgical changes are seen involving the left anterior abdominal wall. No acute osseous abnormality is present. 1. Nonobstructing 3 mm right intrarenal calculus, and punctate left intrarenal calculus 2. Scattered colonic diverticula, without evidence of diverticulitis 3. No acute findings within the abdomen or pelvis           PROCEDURES   Unless otherwise noted below, none     Procedures    CRITICAL CARE TIME   N/A    CONSULTS:  None      EMERGENCY DEPARTMENT COURSE and DIFFERENTIAL DIAGNOSIS/MDM:   Vitals:    Vitals:    06/29/20 2200 06/29/20 2210 06/29/20 2220 06/29/20 2312   BP: 100/69  109/85    Pulse: 61 59 72    Resp:  12 19 16   Temp:       TempSrc:       SpO2: 98% 99% 100% 100%   Weight:           Patient was given the following medications:  Medications   0.9 % sodium chloride bolus (1,000 mLs Intravenous New Bag 6/29/20 2238)   ondansetron (ZOFRAN) injection 4 mg (4 mg Intravenous Given 6/29/20 2238)   famotidine (PEPCID) injection 20 mg (20 mg Intravenous Given 6/29/20 2238)   morphine (PF) injection 2 mg (2 mg Intravenous Given 6/29/20 2238)           ED COURSE & MEDICAL DECISION MAKING    Pertinent Labs & Imaging studies reviewed. (See chart for details)   -  Patient seen and evaluated in the emergency department.   -  Triage and nursing notes reviewed and

## 2020-07-06 ENCOUNTER — CARE COORDINATION (OUTPATIENT)
Dept: CARE COORDINATION | Age: 40
End: 2020-07-06

## 2020-07-06 ENCOUNTER — TELEPHONE (OUTPATIENT)
Dept: INTERNAL MEDICINE CLINIC | Age: 40
End: 2020-07-06

## 2020-07-06 NOTE — CARE COORDINATION
Additional ED follow up call. Patient outreach to [facility] via my chart and email to express her concerns that she is not feeling any better. She stated that she was seen over the weekend at [facility] on Friday and was again started on new meds. She was told she had a severe upper respiratory infection. She was started on doxycycline 100 mg bid, prednisone taper, albuterol inhaler every  6 hours prn, and guaifenesin with codeine cough medicine 10 ml every 4-6 hours. Stated she is having trouble keeping the antibiotics down and is vomiting with them. Cough medicine is effective with her cough but she does not have much left. She continues to complain on feeling short of breath more under her left breast rib area. She is productive of green phlegm and a low grade temp of 100. Patient stated her Covid was again negative at [facility].  She is worried the shortness of breath seems worse on the left side where she had a previous fall after her recent hernia repair. She stated that they did do a chest xray at [address] encouraged patient to use inhaler as ordered and to make sure she is taking in adequate fluids at home to prevent dehydration. I feel she would benefit from an additional follow up visit or Virtual visit with PCP to address her ongoing symptoms and her medication refills that she is concerned with. She stated she does not want to go back to the hospital.    Patient contacted regarding COVID-19 risk and screening. Discussed COVID-19 related testing which was available at this time. Patient stated that Covid was negative at her visit to [facility] over the weekend. Care Transition Nurse/ Ambulatory Care Manager contacted the patient by telephone to perform follow-up assessment. Verified name and  with patient as identifiers. Patient has following risk factors of: multiple medical issues.       Symptoms reviewed with patient who verbalized the following symptoms: fever, fatigue, cough, shortness of breath and vomiting. Due to no improvement in symptoms encounter was routed to provider for escalation. Education provided regarding infection prevention, and signs and symptoms of COVID-19 and when to seek medical attention with patient who verbalized understanding. Discussed exposure protocols and quarantine from [address] you at higher risk for severe illness 2019 and given an opportunity for questions and concerns. The patient agrees to contact the COVID-19 hotline [phone] or PCP office for questions related to their healthcare. CTN/ACM provided contact information for future reference. From CDC: Are you at higher risk for severe illness?  Wash your hands often.  Avoid close contact (6 feet, which is about two arm lengths) with people who are sick.  Put distance between yourself and other people if COVID-19 is spreading in your community.  Clean and disinfect frequently touched surfaces.  Avoid all cruise travel and non-essential air travel.  Call your healthcare professional if you have concerns about COVID-19 and your underlying condition or if you are sick. For more information on steps you can take to protect yourself, see CDC's How to Protect Yourself      active in covid loop and message forwarded for provider for additional assistance based on severity of symptoms and risk factors.

## 2020-07-06 NOTE — TELEPHONE ENCOUNTER
Pt went to [facility] on July 3rd. She was dx with severe respiratory infection. She was told to f/u with PCP/   She said she feels worse now than she did at the ER. She has had a headache for a week and still has a temp of 100.3 and feels very weak. She said her Covid test was negative. She said she would like to have a Doxy visit. Call patient.

## 2020-07-06 NOTE — TELEPHONE ENCOUNTER
Called Petrona and set up VV for tomorrow, was prescribed codeine cough syrup at ER. Said really helped but only received enough x 3 days.  Asked if she can get some more ordered for tonight or something that will help please advise

## 2020-07-06 NOTE — CARE COORDINATION
This RN responded to a red alert on the loop with multiple worsening symptoms. I attempted to call and left a voicemail as well as sent a message back over the loop. Conrad Jin RN, 10:16 AM   Thank you for checking in on the loop Petrona. I tried to call and left a voicemail. It looks like you may have reached out to your primary care provider as well today. You do need to get a follow up there since you are feeling no better. If you would like to speak to a nurse, I am available today until 4 pm. Conrad Jin -[phone].

## 2020-07-07 ENCOUNTER — CARE COORDINATION (OUTPATIENT)
Dept: CARE COORDINATION | Age: 40
End: 2020-07-07

## 2020-07-07 ENCOUNTER — VIRTUAL VISIT (OUTPATIENT)
Dept: INTERNAL MEDICINE CLINIC | Age: 40
End: 2020-07-07
Payer: COMMERCIAL

## 2020-07-07 PROCEDURE — 4004F PT TOBACCO SCREEN RCVD TLK: CPT | Performed by: INTERNAL MEDICINE

## 2020-07-07 PROCEDURE — G8417 CALC BMI ABV UP PARAM F/U: HCPCS | Performed by: INTERNAL MEDICINE

## 2020-07-07 PROCEDURE — G8428 CUR MEDS NOT DOCUMENT: HCPCS | Performed by: INTERNAL MEDICINE

## 2020-07-07 PROCEDURE — 99214 OFFICE O/P EST MOD 30 MIN: CPT | Performed by: INTERNAL MEDICINE

## 2020-07-07 RX ORDER — GUAIFENESIN AND CODEINE PHOSPHATE 100; 10 MG/5ML; MG/5ML
5 SOLUTION ORAL 3 TIMES DAILY PRN
Qty: 85 ML | Refills: 0 | Status: SHIPPED | OUTPATIENT
Start: 2020-07-07 | End: 2020-07-21

## 2020-07-07 RX ORDER — AZITHROMYCIN 250 MG/1
250 TABLET, FILM COATED ORAL SEE ADMIN INSTRUCTIONS
Qty: 6 TABLET | Refills: 0 | Status: SHIPPED | OUTPATIENT
Start: 2020-07-07 | End: 2020-07-12

## 2020-07-07 ASSESSMENT — ENCOUNTER SYMPTOMS
CONSTIPATION: 0
BLOOD IN STOOL: 0
ABDOMINAL DISTENTION: 0
RHINORRHEA: 0
COUGH: 1
EYE PAIN: 0
PHOTOPHOBIA: 0
CHEST TIGHTNESS: 0
DIARRHEA: 0
EYE DISCHARGE: 0
WHEEZING: 1
SINUS PAIN: 0
SHORTNESS OF BREATH: 0
NAUSEA: 0
BACK PAIN: 0
ABDOMINAL PAIN: 0
VOMITING: 0

## 2020-07-07 NOTE — PROGRESS NOTES
2020    TELEHEALTH EVALUATION -- Audio/Visual (During HLEZW-17 public health emergency)    HPI:    Tomy Tran (:  1980) has requested an audio/video evaluation for the following concern(s):    The pt is a 39YOF with PMH of obesity who is being seen today for cough and fever    The cough started about a week ago, her chest is hurting from coughing too much. Her ribs hurt. She is also having a fever of 101. bringing up green phlegm with some streaks of blood occasionally. Jonathan Kaur She went to [facility] on Friday and was given doxycycline with steroids as well as Tussinex which helped with the cough. She did have a CXR at that time which did not show any concern for pneumonia. Has had headache for the last week as well. She has not been able to sleep at night and was up with coughing. No hx of asthma and allergies. COVID test was negative  She is almost crying saying she wants to get better  Review of Systems   Constitutional: Positive for activity change, fatigue and fever. Negative for appetite change, chills and unexpected weight change. HENT: Positive for congestion. Negative for ear discharge, ear pain, mouth sores, nosebleeds, rhinorrhea, sinus pain and sneezing. Eyes: Negative for photophobia, pain, discharge and visual disturbance. Respiratory: Positive for cough and wheezing. Negative for chest tightness and shortness of breath. Cardiovascular: Negative for chest pain, palpitations and leg swelling. Gastrointestinal: Negative for abdominal distention, abdominal pain, blood in stool, constipation, diarrhea, nausea and vomiting. Endocrine: Negative for polydipsia, polyphagia and polyuria. Genitourinary: Negative for dysuria, flank pain and hematuria. Musculoskeletal: Negative for back pain and gait problem. Skin: Negative for pallor, rash and wound. Neurological: Negative for dizziness, tremors, facial asymmetry, speech difficulty, weakness, numbness and headaches. Psychiatric/Behavioral: Negative for agitation, confusion, dysphoric mood, self-injury and suicidal ideas. The patient is not nervous/anxious. All other systems reviewed and are negative. Prior to Visit Medications    Medication Sig Taking? Authorizing Provider   azithromycin (ZITHROMAX) 250 MG tablet Take 1 tablet by mouth See Admin Instructions for 5 days 500mg on day 1 followed by 250mg on days 2 - 5 Yes Robert Alves MD   guaiFENesin-codeine (TUSSI-ORGANIDIN NR) 100-10 MG/5ML syrup Take 5 mLs by mouth 3 times daily as needed for Cough or Congestion (every 8 hrs as needed) for up to 14 days. Yes Robert Alves MD   famotidine (PEPCID) 10 MG tablet Take 2 tablets by mouth 2 times daily  CRISPIN Hunter   ondansetron (ZOFRAN) 4 MG tablet Take 1 tablet by mouth every 8 hours as needed for Nausea  Cece Acosta, APRN - CNP       Social History     Tobacco Use    Smoking status: Current Every Day Smoker     Packs/day: 0.50     Years: 18.00     Pack years: 9.00     Types: Cigarettes    Smokeless tobacco: Never Used   Substance Use Topics    Alcohol use: No     Alcohol/week: 0.0 standard drinks    Drug use: No        Allergies   Allergen Reactions    Bentyl [Dicyclomine Hcl] Shortness Of Breath and Anxiety    Mushroom Extract Complex Anaphylaxis     Can't eat mushrooms.      Sulfa Antibiotics Shortness Of Breath    Vancomycin Anaphylaxis and Hives    Adhesive Tape     Bee Venom Swelling    Hydrocodone Hives    Levaquin [Levofloxacin]     Toradol [Ketorolac Tromethamine] Hives and Itching    Ceftaroline Rash    Daptomycin Swelling and Rash    Dicyclomine Anxiety    Fioricet-Codeine [Butalbital-Apap-Caff-Cod] Anxiety    Prochlorperazine Anxiety    Zosyn [Piperacillin Sod-Tazobactam So] Hives     Also causes nausea, SOB, dizziness   ,   Past Medical History:   Diagnosis Date    ADHD (attention deficit hyperactivity disorder) 82    Anesthesia complication     pt states with general anesthesia, she gets very nauseated and wakes up with a migrain     Depression with anxiety     Difficult intubation     ESBL (extended spectrum beta-lactamase) producing bacteria infection 2019    urine    Functional ovarian cysts 2008    rt ovary cyst x 2 yrs.     Headache(784.0)     migraines    Hiatal hernia     History of blood transfusion     History of kidney stones     History of PCOS     Hydrocephalus (HCC)     Irritable bowel syndrome 2004    had colonoscopy about 6 yrs ago    Kidney stone     Meningitis     Neutrophilic leukocytosis     Nicotine dependence     Primary osteoarthritis of left knee 2016    S/P cone biopsy of cervix     Scoliosis .s     (ventriculoperitoneal) shunt status     hydrocephalus f/w Neurosurgeon at [place] [phone] Wears glasses     reading   ,   Past Surgical History:   Procedure Laterality Date    APPENDECTOMY      appendicitis     SECTION      placenta previa    CHOLECYSTECTOMY      COLONOSCOPY  2004    COLPOSCOPY      CSF SHUNT      replaced at age 17   [facility] CYSTOSCOPY      stone removal   [facility]    inguinal    HYSTERECTOMY, TOTAL ABDOMINAL  2016    TAHBSO, adhesions/PCOS    KNEE ARTHROSCOPY Left 2015    medial meniscectomy, chondroplasty, plica resection    LITHOTRIPSY Bilateral 12/10/2019    OTHER SURGICAL HISTORY  10/19/2016    op lap    VENTRICULOPERITONEAL SHUNT      multiple revisions, most recent    ,   Social History     Tobacco Use    Smoking status: Current Every Day Smoker     Packs/day: 0.50     Years: 18.00     Pack years: 9.00     Types: Cigarettes    Smokeless tobacco: Never Used   Substance Use Topics    Alcohol use: No     Alcohol/week: 0.0 standard drinks    Drug use: No   ,   Family History   Problem Relation Age of Onset    High Blood Pressure Mother     Diabetes Mother     High Cholesterol Mother     Depression Mother     Diabetes Maternal declaration under the [address], [address] [place] [address] authority and the [employer] and [employer] Act, this Virtual Visit was conducted with patient's (and/or legal guardian's) consent, to reduce the patient's risk of exposure to COVID-19 and provide necessary medical care. The patient (and/or legal guardian) has also been advised to contact this office for worsening conditions or problems, and seek emergency medical treatment and/or call 911 if deemed necessary. Patient identification was verified at the start of the visit: Yes    Total time spent on this encounter: 22       Services were provided through a video synchronous discussion virtually to substitute for in-person clinic visit. Patient and provider were located at their individual homes. --Susan Dominguez MD on 7/7/2020 at 11:54 AM    An electronic signature was used to authenticate this note.

## 2020-07-07 NOTE — CARE COORDINATION
This RN responded to a yellow alert on the loop as well as a note. petrona, 11:04 AM   Ok thank you sorry I didnt Hear my phone I just Really feel awful   Emeka Rangel RN, 11:16 AM   Thank you for checking in Petrona. It looks like you have a virtual appointment with your provider today. I am so glad you are follow up with them. Hopefully they can provide you with some relief soon. Keep pushing fluids like water and Gatorade and Tylenol for body aches and fever. Emeka Rangel -[phone]     Emeka Rangel RN, 11:13 AM   No worries. I am sorry you feel so bad. It looks like you have a virtual visit with your doctor in a few minutes. Hopefully you will get some help and be feeling better soon.  Emeka Rangel -[phone]

## 2020-07-08 ENCOUNTER — TELEPHONE (OUTPATIENT)
Dept: INTERNAL MEDICINE CLINIC | Age: 40
End: 2020-07-08

## 2020-07-08 ENCOUNTER — CARE COORDINATION (OUTPATIENT)
Dept: CARE COORDINATION | Age: 40
End: 2020-07-08

## 2020-07-08 NOTE — TELEPHONE ENCOUNTER
Pt called complaining that the bottle of cough medicine she was given was very small and after one day it's almost gone. I was try to go over with her the directions and she got very upset and said no one told her to take it 3 times a day. She's been taking 5-10ml several times a day. Wants a bigger bottle. It is helping.      She thought I was being mean and hung up

## 2020-07-08 NOTE — TELEPHONE ENCOUNTER
Please inform patient that the directions should be on the bottle. Unfortunately no more medication can be given as this is only short term dosed. Encourage patient to take other medications as instructed.

## 2020-07-08 NOTE — CARE COORDINATION
Received call from the patient regarding no improvement in her symptoms. She told me that she was still having a productive green phlegm with her cough and that she had severe left lung pain with inspiration. The pain woke her up last night. She is anxious about the symptoms not improving and feels like something is wrong. Temp was 99.2 . She completed VV yesterday. She is still short of breath and has been using her inhaler ACM has advised her to return to the ER with the nature of her symptoms. She is agreeable. Patient contacted regarding COVID-19 risk and screening. Discussed COVID-19 related testing which was not done at this time. Test results were not done. Patient informed of results, if available? No.    Care Transition Nurse/ Ambulatory Care Manager contacted the patient by telephone to perform follow-up assessment. Verified name and  with patient as identifiers. Patient has following risk factors of: smoker and recent surgery for hernia repair       Symptoms reviewed with patient who verbalized the following symptoms: fever, fatigue, cough, shortness of breath, sweating, vomiting and chest pain. Due to worsening symptoms encounter was routed to provider for escalation. Education provided regarding infection prevention, and signs and symptoms of COVID-19 and when to seek medical attention with patient who verbalized understanding. Discussed exposure protocols and quarantine from [address] you at higher risk for severe illness  and given an opportunity for questions and concerns. The patient agrees to contact the COVID-19 hotline [phone] or PCP office for questions related to their healthcare. CTN/ACM provided contact information for future reference. From CDC: Are you at higher risk for severe illness?  Wash your hands often.  Avoid close contact (6 feet, which is about two arm lengths) with people who are sick.    Put distance between yourself and other people if COVID-19 is spreading in your community.  Clean and disinfect frequently touched surfaces.  Avoid all cruise travel and non-essential air travel.  Call your healthcare professional if you have concerns about COVID-19 and your underlying condition or if you are sick. For more information on steps you can take to protect yourself, see CDC's How to Protect Yourself      Pt will be further monitored by COVID Loop Team based on severity of symptoms and risk factors. Additional telephone support.    Directed to return to ER for additional assessment

## 2020-07-09 ENCOUNTER — APPOINTMENT (OUTPATIENT)
Dept: GENERAL RADIOLOGY | Age: 40
End: 2020-07-09
Payer: COMMERCIAL

## 2020-07-09 ENCOUNTER — CARE COORDINATION (OUTPATIENT)
Dept: CARE COORDINATION | Age: 40
End: 2020-07-09

## 2020-07-09 ENCOUNTER — TELEPHONE (OUTPATIENT)
Dept: INTERNAL MEDICINE CLINIC | Age: 40
End: 2020-07-09

## 2020-07-09 ENCOUNTER — APPOINTMENT (OUTPATIENT)
Dept: CT IMAGING | Age: 40
End: 2020-07-09
Payer: COMMERCIAL

## 2020-07-09 ENCOUNTER — HOSPITAL ENCOUNTER (EMERGENCY)
Age: 40
Discharge: HOME | End: 2020-07-09
Attending: EMERGENCY MEDICINE
Payer: COMMERCIAL

## 2020-07-09 VITALS
SYSTOLIC BLOOD PRESSURE: 109 MMHG | DIASTOLIC BLOOD PRESSURE: 70 MMHG | TEMPERATURE: 97.4 F | OXYGEN SATURATION: 97 % | HEART RATE: 83 BPM | RESPIRATION RATE: 16 BRPM

## 2020-07-09 LAB
A/G RATIO: 1.3 (ref 1.1–2.2)
ALBUMIN SERPL-MCNC: 4.4 G/DL (ref 3.4–5)
ALP BLD-CCNC: 150 U/L (ref 40–129)
ALT SERPL-CCNC: 179 U/L (ref 10–40)
ANION GAP SERPL CALCULATED.3IONS-SCNC: 20 MMOL/L (ref 3–16)
AST SERPL-CCNC: 111 U/L (ref 15–37)
BASOPHILS ABSOLUTE: 0.1 K/UL (ref 0–0.2)
BASOPHILS RELATIVE PERCENT: 0.7 %
BILIRUB SERPL-MCNC: 0.5 MG/DL (ref 0–1)
BILIRUBIN URINE: NEGATIVE
BLOOD, URINE: NEGATIVE
BUN BLDV-MCNC: 16 MG/DL (ref 7–20)
CALCIUM SERPL-MCNC: 9.4 MG/DL (ref 8.3–10.6)
CHLORIDE BLD-SCNC: 93 MMOL/L (ref 99–110)
CLARITY: CLEAR
CO2: 23 MMOL/L (ref 21–32)
COLOR: YELLOW
CREAT SERPL-MCNC: 0.8 MG/DL (ref 0.6–1.1)
EKG ATRIAL RATE: 84 BPM
EKG DIAGNOSIS: NORMAL
EKG P AXIS: 54 DEGREES
EKG P-R INTERVAL: 130 MS
EKG Q-T INTERVAL: 414 MS
EKG QRS DURATION: 78 MS
EKG QTC CALCULATION (BAZETT): 489 MS
EKG R AXIS: 8 DEGREES
EKG T AXIS: 20 DEGREES
EKG VENTRICULAR RATE: 84 BPM
EOSINOPHILS ABSOLUTE: 0.2 K/UL (ref 0–0.6)
EOSINOPHILS RELATIVE PERCENT: 1.6 %
GFR AFRICAN AMERICAN: >60
GFR NON-AFRICAN AMERICAN: >60
GLOBULIN: 3.3 G/DL
GLUCOSE BLD-MCNC: 127 MG/DL (ref 70–99)
GLUCOSE URINE: NEGATIVE MG/DL
HCT VFR BLD CALC: 47.3 % (ref 36–48)
HEMOGLOBIN: 15.9 G/DL (ref 12–16)
KETONES, URINE: NEGATIVE MG/DL
LEUKOCYTE ESTERASE, URINE: NEGATIVE
LIPASE: 13 U/L (ref 13–60)
LYMPHOCYTES ABSOLUTE: 4.6 K/UL (ref 1–5.1)
LYMPHOCYTES RELATIVE PERCENT: 33.3 %
MAGNESIUM: 2.3 MG/DL (ref 1.8–2.4)
MCH RBC QN AUTO: 29.2 PG (ref 26–34)
MCHC RBC AUTO-ENTMCNC: 33.6 G/DL (ref 31–36)
MCV RBC AUTO: 86.9 FL (ref 80–100)
MICROSCOPIC EXAMINATION: NORMAL
MONOCYTES ABSOLUTE: 0.9 K/UL (ref 0–1.3)
MONOCYTES RELATIVE PERCENT: 6.4 %
NEUTROPHILS ABSOLUTE: 8 K/UL (ref 1.7–7.7)
NEUTROPHILS RELATIVE PERCENT: 58 %
NITRITE, URINE: NEGATIVE
PDW BLD-RTO: 13.8 % (ref 12.4–15.4)
PH UA: 6.5 (ref 5–8)
PLATELET # BLD: 285 K/UL (ref 135–450)
PMV BLD AUTO: 7.6 FL (ref 5–10.5)
POTASSIUM REFLEX MAGNESIUM: 3.2 MMOL/L (ref 3.5–5.1)
PROTEIN UA: NEGATIVE MG/DL
RBC # BLD: 5.44 M/UL (ref 4–5.2)
SODIUM BLD-SCNC: 136 MMOL/L (ref 136–145)
SPECIFIC GRAVITY UA: >1.03 (ref 1–1.03)
TOTAL PROTEIN: 7.7 G/DL (ref 6.4–8.2)
TROPONIN: <0.01 NG/ML
URINE REFLEX TO CULTURE: NORMAL
URINE TYPE: NORMAL
UROBILINOGEN, URINE: 0.2 E.U./DL
WBC # BLD: 13.8 K/UL (ref 4–11)

## 2020-07-09 PROCEDURE — 71045 X-RAY EXAM CHEST 1 VIEW: CPT

## 2020-07-09 PROCEDURE — 80053 COMPREHEN METABOLIC PANEL: CPT

## 2020-07-09 PROCEDURE — 6360000004 HC RX CONTRAST MEDICATION: Performed by: NURSE PRACTITIONER

## 2020-07-09 PROCEDURE — 84484 ASSAY OF TROPONIN QUANT: CPT

## 2020-07-09 PROCEDURE — 6360000002 HC RX W HCPCS: Performed by: NURSE PRACTITIONER

## 2020-07-09 PROCEDURE — 96375 TX/PRO/DX INJ NEW DRUG ADDON: CPT

## 2020-07-09 PROCEDURE — 93005 ELECTROCARDIOGRAM TRACING: CPT | Performed by: NURSE PRACTITIONER

## 2020-07-09 PROCEDURE — 99285 EMERGENCY DEPT VISIT HI MDM: CPT

## 2020-07-09 PROCEDURE — 74177 CT ABD & PELVIS W/CONTRAST: CPT

## 2020-07-09 PROCEDURE — 85025 COMPLETE CBC W/AUTO DIFF WBC: CPT

## 2020-07-09 PROCEDURE — 96365 THER/PROPH/DIAG IV INF INIT: CPT

## 2020-07-09 PROCEDURE — 83690 ASSAY OF LIPASE: CPT

## 2020-07-09 PROCEDURE — 2580000003 HC RX 258: Performed by: NURSE PRACTITIONER

## 2020-07-09 PROCEDURE — 71260 CT THORAX DX C+: CPT

## 2020-07-09 PROCEDURE — 83735 ASSAY OF MAGNESIUM: CPT

## 2020-07-09 PROCEDURE — 81003 URINALYSIS AUTO W/O SCOPE: CPT

## 2020-07-09 PROCEDURE — 93010 ELECTROCARDIOGRAM REPORT: CPT | Performed by: INTERNAL MEDICINE

## 2020-07-09 RX ORDER — MORPHINE SULFATE 4 MG/ML
4 INJECTION, SOLUTION INTRAMUSCULAR; INTRAVENOUS ONCE
Status: COMPLETED | OUTPATIENT
Start: 2020-07-09 | End: 2020-07-09

## 2020-07-09 RX ORDER — 0.9 % SODIUM CHLORIDE 0.9 %
1000 INTRAVENOUS SOLUTION INTRAVENOUS ONCE
Status: COMPLETED | OUTPATIENT
Start: 2020-07-09 | End: 2020-07-09

## 2020-07-09 RX ORDER — POTASSIUM CHLORIDE 7.45 MG/ML
10 INJECTION INTRAVENOUS ONCE
Status: COMPLETED | OUTPATIENT
Start: 2020-07-09 | End: 2020-07-09

## 2020-07-09 RX ADMIN — IOPAMIDOL 75 ML: 755 INJECTION, SOLUTION INTRAVENOUS at 02:54

## 2020-07-09 RX ADMIN — SODIUM CHLORIDE 1000 ML: 9 INJECTION, SOLUTION INTRAVENOUS at 01:50

## 2020-07-09 RX ADMIN — MORPHINE SULFATE 4 MG: 4 INJECTION, SOLUTION INTRAMUSCULAR; INTRAVENOUS at 03:09

## 2020-07-09 RX ADMIN — Medication 25 MG: at 02:40

## 2020-07-09 RX ADMIN — SODIUM CHLORIDE 1000 ML: 9 INJECTION, SOLUTION INTRAVENOUS at 03:34

## 2020-07-09 RX ADMIN — POTASSIUM CHLORIDE 10 MEQ: 7.46 INJECTION, SOLUTION INTRAVENOUS at 03:09

## 2020-07-09 ASSESSMENT — ENCOUNTER SYMPTOMS
COUGH: 1
COLOR CHANGE: 0
VOMITING: 1
ABDOMINAL PAIN: 1
BLOOD IN STOOL: 0
BACK PAIN: 0
CONSTIPATION: 0
SINUS PRESSURE: 0
SINUS PAIN: 0
SHORTNESS OF BREATH: 1
ABDOMINAL DISTENTION: 0
DIARRHEA: 0
NAUSEA: 1
SORE THROAT: 0

## 2020-07-09 ASSESSMENT — PAIN SCALES - GENERAL: PAINLEVEL_OUTOF10: 8

## 2020-07-09 NOTE — ED PROVIDER NOTES
I independently examined and evaluated Ca Guzman. In brief their history revealed patient with multiple complaints. She complains of shortness of breath, cough, nausea, vomiting and low-grade fever. This is been going on for over a week. She has been seen in ER twice and by her primary care physician by a virtual visit. She is currently on an antibiotic as she states yesterday around 4 PM she was seen at [facility] and diagnosed with pneumonia. She comes in tonight because she is not feeling any better. She also has had a headache. She has a  shunt for history of congenital hydrocephalus. She states she feels short of breath. She wants to be admitted to the hospital.. Their exam revealed patient awake and alert and in no acute distress. Speaking in full sentences. Vitals stable. Abdomen benign. . All diagnostic, treatment, and disposition decisions were made by myself in conjunction with the mid-level provider. Before disposition, questions were sought and answered with the patient. They have voiced understanding and agree with the plan. 12 lead EKG:  Normal Sinus Rhythm 84  Axis is   Normal  QTc is  normal  There is no specific ST-T wave changes appreciated. There is no clear evidence of acute ischemia or infarction. It was compared against an EKG from 10/31/19. Patient's vital signs are stable. She appears in no acute distress on my evaluation. She had a CT of her head done yesterday at 4:30 PM that showed no acute abnormalities. Do not feel that this needs to be repeated. CTA of the chest shows no acute PE and specifically shows no signs of pneumonia, effusion or edema. She is 4 weeks status post hernia repair and is having some upper abdominal pain. CT of the abdomen and pelvis shows diverticulosis without signs of diverticulitis or infection. Her LFTs are slightly elevated. Patient and I discussed all of these findings. She does have a slight leukocytosis.   She had a COVID swab yesterday. Given the patient's overall benign appearance and her work-up which at this time is unremarkable I do believe patient is stable for discharge home. I do not believe that she needs to be admitted to the hospital.  Patient will follow-up with her primary care physician. For all further details of the patient's emergency department visit, please see the mid-level practitioner's documentation.         Javier Amezquita MD  07/09/20 4746

## 2020-07-09 NOTE — ED PROVIDER NOTES
[address] [facility]        Pt Name: Chichi Hebert  MRN: [id number]  [place] 1980  Date of evaluation: 7/8/2020  Provider: FREDRICK Zhao CNP  PCP: Christella Sicard, APRN - CNP     I have seen and evaluated this patient with my supervising physician Nga Wallace MD.    [address] [place] [address]       Chief Complaint   Patient presents with    Abdominal Pain     LUQ.  hernia surgery 4 wks ago.  + n/v.  normal bowels.  Shortness of Breath     x1 week. cough w/ green and blood. + chest tightness. low grade fever.  + h/a       HISTORY OF PRESENT ILLNESS   (Location, Timing/Onset, Context/Setting, Quality, Duration, Modifying Factors, Severity, Associated Signs and Symptoms)  Note limiting factors. Chichi Hebert is a 42 y.o. female who presents to the emergency department today complaining of shortness of breath, cough, n/v, and low-grade fever. She advised she is felt bad for a week. The cough is productive with green sputum and every once a while and has a small amount of blood-tinged in it. She has chest tightness when she gets into a coughing fit. She is also complaining of a headache but advised that she had a CT of her head done last night because she has a  shunt from congenital hydrocephalus, and it was normal.  It is not the worst headache of her life and was not a thunderclap onset. Patient also complaining of LUQ abdominal pain from her hernia surgery she had done 4 weeks ago. Normal bowel movements. Nursing Notes were all reviewed and agreed with or any disagreements were addressed in the HPI. REVIEW OF SYSTEMS    (2-9 systems for level 4, 10 or more for level 5)     Review of Systems   Constitutional: Positive for fever (low grade). Negative for diaphoresis. HENT: Negative. Negative for congestion, sinus pressure, sinus pain and sore throat.     Respiratory: Positive for cough and shortness of breath. Cardiovascular: Positive for chest pain. Gastrointestinal: Positive for abdominal pain (LUQ), nausea and vomiting. Negative for abdominal distention, blood in stool, constipation and diarrhea. Genitourinary: Negative for dysuria, flank pain, frequency and hematuria. Musculoskeletal: Negative for back pain, neck pain and neck stiffness. Skin: Negative for color change and rash. Allergic/Immunologic: Negative for immunocompromised state. Neurological: Positive for headaches. Negative for dizziness, syncope, weakness, light-headedness and numbness. Hematological: Negative for adenopathy. Psychiatric/Behavioral: Negative for confusion. All other systems reviewed and are negative. Positives and Pertinent negatives as per HPI. Except as noted above in the ROS, all other systems were reviewed and negative. PAST MEDICAL HISTORY     Past Medical History:   Diagnosis Date    ADHD (attention deficit hyperactivity disorder) 82    Anesthesia complication     pt states with general anesthesia, she gets very nauseated and wakes up with a migrain     Depression with anxiety     Difficult intubation     ESBL (extended spectrum beta-lactamase) producing bacteria infection 11/06/2019    urine    Functional ovarian cysts 2008    rt ovary cyst x 2 yrs.     Headache(784.0)     migraines    Hiatal hernia     History of blood transfusion     History of kidney stones     History of PCOS     Hydrocephalus (HCC)     Irritable bowel syndrome 2004    had colonoscopy about 6 yrs ago    Kidney stone     Meningitis 99/5943    Neutrophilic leukocytosis     Nicotine dependence     Primary osteoarthritis of left knee 7/1/2016    S/P cone biopsy of cervix 2004    Scoliosis 1990.s     (ventriculoperitoneal) shunt status 1982    hydrocephalus f/w Neurosurgeon at [facility] [address]     reading         SURGICAL HISTORY     Past Surgical History:   Procedure Laterality Date    APPENDECTOMY 2003    appendicitis     SECTION      placenta previa    CHOLECYSTECTOMY      COLONOSCOPY  2004    COLPOSCOPY  2004    CSF SHUNT      replaced at age 17    CYSTOSCOPY      stone removal   [facility]    inguinal    HYSTERECTOMY, TOTAL ABDOMINAL  2016    TAHBSO, adhesions/PCOS    KNEE ARTHROSCOPY Left 2015    medial meniscectomy, chondroplasty, plica resection    LITHOTRIPSY Bilateral 12/10/2019    OTHER SURGICAL HISTORY  10/19/2016    op lap    VENTRICULOPERITONEAL SHUNT      multiple revisions, most recent          CURRENTMEDICATIONS       Discharge Medication List as of 2020  4:59 AM      CONTINUE these medications which have NOT CHANGED    Details   guaiFENesin-codeine (TUSSI-ORGANIDIN NR) 100-10 MG/5ML syrup Take 5 mLs by mouth 3 times daily as needed for Cough or Congestion (every 8 hrs as needed) for up to 14 days. , Disp-85 mL, R-0Normal      famotidine (PEPCID) 10 MG tablet Take 2 tablets by mouth 2 times daily, Disp-120 tablet, R-0Print      azithromycin (ZITHROMAX) 250 MG tablet Take 1 tablet by mouth See Admin Instructions for 5 days 500mg on day 1 followed by 250mg on days 2 - 5, Disp-6 tablet, R-0Normal      ondansetron (ZOFRAN) 4 MG tablet Take 1 tablet by mouth every 8 hours as needed for Nausea, Disp-20 tablet, R-0Normal               ALLERGIES     Bentyl [dicyclomine hcl]; Mushroom extract complex; Sulfa antibiotics; Vancomycin; Adhesive tape; Bee venom; Hydrocodone; Levaquin [levofloxacin]; Toradol [ketorolac tromethamine]; Ceftaroline; Daptomycin; Dicyclomine;  Fioricet-codeine [butalbital-apap-caff-cod]; and Zosyn [piperacillin sod-tazobactam so]    FAMILYHISTORY       Family History   Problem Relation Age of Onset    High Blood Pressure Mother     Diabetes Mother     High Cholesterol Mother     Depression Mother     Diabetes Maternal Grandmother     High Blood Pressure Maternal Grandmother     High Cholesterol Maternal Grandmother     Heart Disease Maternal Grandfather     High Blood Pressure Maternal Grandfather     Heart Disease Paternal Grandmother     Stroke Paternal Grandfather     Depression Sister     Cirrhosis Father     Rheum Arthritis Neg Hx     Osteoarthritis Neg Hx     Asthma Neg Hx     Breast Cancer Neg Hx     Cancer Neg Hx     Heart Failure Neg Hx     Hypertension Neg Hx     Migraines Neg Hx     Ovarian Cancer Neg Hx     Rashes/Skin Problems Neg Hx     Seizures Neg Hx     Thyroid Disease Neg Hx           SOCIAL HISTORY       Social History     Tobacco Use    Smoking status: Current Some Day Smoker     Packs/day: 0.50     Years: 18.00     Pack years: 9.00     Types: Cigarettes    Smokeless tobacco: Never Used   Substance Use Topics    Alcohol use: No     Alcohol/week: 0.0 standard drinks    Drug use: No       SCREENINGS             PHYSICAL EXAM    (up to 7 for level 4, 8 or more for level 5)     ED Triage Vitals [07/08/20 2346]   BP Temp Temp src Pulse Resp SpO2 Height Weight   105/73 97.4 °F (36.3 °C) -- 114 16 95 % -- --       Physical Exam  Vitals signs and nursing note reviewed. Constitutional:       General: She is not in acute distress. Appearance: Normal appearance. She is well-developed. She is not toxic-appearing. HENT:      Head: Normocephalic and atraumatic. Mouth/Throat:      Lips: Pink. Mouth: Mucous membranes are moist.      Pharynx: Oropharynx is clear. Eyes:      General: No scleral icterus. Conjunctiva/sclera: Conjunctivae normal.   Neck:      Musculoskeletal: Full passive range of motion without pain and neck supple. No neck rigidity. Vascular: No JVD. Cardiovascular:      Rate and Rhythm: Regular rhythm. Tachycardia present. Heart sounds: No murmur. No friction rub. No gallop. Pulmonary:      Effort: Pulmonary effort is normal. No respiratory distress. Breath sounds: Normal breath sounds.    Abdominal:      General: Bowel sounds are normal. There is no distension. Palpations: Abdomen is soft. Abdomen is not rigid. Tenderness: There is abdominal tenderness. There is no guarding or rebound. Comments: Well-healed surgical scar upper abdomen   Musculoskeletal: Normal range of motion. Skin:     General: Skin is warm and dry. Capillary Refill: Capillary refill takes less than 2 seconds. Findings: No rash. Neurological:      General: No focal deficit present. Mental Status: She is alert and oriented to person, place, and time. Cranial Nerves: Cranial nerves are intact.    Psychiatric:         Mood and Affect: Mood normal.         DIAGNOSTIC RESULTS   LABS:    Labs Reviewed   CBC WITH AUTO DIFFERENTIAL - Abnormal; Notable for the following components:       Result Value    WBC 13.8 (*)     RBC 5.44 (*)     Neutrophils Absolute 8.0 (*)     All other components within normal limits    Narrative:     Performed at:  [address] [facility] [address]   Phone [phone]   COMPREHENSIVE METABOLIC PANEL W/ REFLEX TO MG FOR LOW K - Abnormal; Notable for the following components:    Potassium reflex Magnesium 3.2 (*)     Chloride 93 (*)     Anion Gap 20 (*)     Glucose 127 (*)     Alkaline Phosphatase 150 (*)      (*)      (*)     All other components within normal limits    Narrative:     Performed at:  [facility]  [address] [facility] [employer] [address]   Phone [phone]   LIPASE    Narrative:     Performed at:  [facility]  [address] [facility] [address]   Phone [phone]   URINE RT REFLEX TO CULTURE    Narrative:     Performed at:  [facility]  [address] [facility] [employer] [address]   Phone [phone]   MAGNESIUM    Narrative:     Performed at:  [facility]  [facility] [address] [place] [place] [facility] [employer] [address] Phone [phone]   TROPONIN    Narrative:     Performed at:  [facility]  [address] [facility] [address]   Phone [phone]       All other labs were within normal range or not returned as of this dictation. EKG: All EKG's are interpreted by the Emergency Department Physician in the absence of a cardiologist.  Please see their note for interpretation of EKG. RADIOLOGY:   Non-plain film images such as CT, Ultrasound and MRI are read by the radiologist. Plain radiographic images are visualized and preliminarily interpreted by the ED Provider with the below findings:        Interpretation per the Radiologist below, if available at the time of this note:    CT ABDOMEN PELVIS W IV CONTRAST Additional Contrast? None   Final Result   1. Diverticulosis without scan evidence for diverticulitis or other acute   process. 2. Nonobstructing right renal calculus. CT CHEST PULMONARY EMBOLISM W CONTRAST   Final Result   No evidence of pulmonary embolism or acute pulmonary abnormality. XR CHEST PORTABLE   Final Result   No acute cardiopulmonary pathology. Xr Chest Portable    Result Date: 7/9/2020  EXAMINATION: ONE XRAY VIEW OF THE CHEST 7/9/2020 1:21 am COMPARISON: 02/18/2020 HISTORY: ORDERING SYSTEM PROVIDED HISTORY: SOB TECHNOLOGIST PROVIDED HISTORY: Reason for exam:->SOB Reason for Exam: Emesis (patient seen at another hospital today, diagnosed with PNA, states she feels like she cannot breathe, emesis and HA) Acuity: Acute FINDINGS: Single portable frontal view of the chest is submitted for review. The cardiac silhouette is normal in size. Lung parenchyma is clear without focal airspace consolidation, sizeable pleural effusion, or pneumothorax.  shunt tubing noted overlying the left hemithorax. Trachea is midline. Visualized osseous structures and soft tissues are grossly intact.      No acute cardiopulmonary pathology. PROCEDURES   Unless otherwise noted below, none     Procedures    CRITICAL CARE TIME   N/A    CONSULTS:  None      EMERGENCY DEPARTMENT COURSE and DIFFERENTIAL DIAGNOSIS/MDM:   Vitals:    Vitals:    07/09/20 0338 07/09/20 0346 07/09/20 0402 07/09/20 0446   BP: 113/75 105/77 102/73 109/70   Pulse:    83   Resp:       Temp:       SpO2: 96% 96% 99% 97%       Patient was given the following medications:  Medications   0.9 % sodium chloride bolus (0 mLs Intravenous Stopped 7/9/20 0334)   0.9 % sodium chloride bolus (0 mLs Intravenous Stopped 7/9/20 0442)   potassium chloride 10 mEq/100 mL IVPB (Peripheral Line) (0 mEq Intravenous Stopped 7/9/20 0442)   promethazine (PHENERGAN) in sodium chloride 0.9% 50 mL IVPB 25 mg (0 mg Intravenous Stopped 7/9/20 0309)   morphine (PF) injection 4 mg (4 mg Intravenous Given 7/9/20 0309)   iopamidol (ISOVUE-370) 76 % injection 75 mL (75 mLs Intravenous Given 7/9/20 0254)           Differential Diagnosis: Acute Coronary Syndrome, Congestive Heart Failure, Myocardial Infarction, Pulmonary Embolus, Pneumonia, Pneumothorax, other    Patient presents with several complaints. See HPI for full presentation. Physical exam as above. Mild LUQ tenderness. She has a surgical scar that is well-healed with no signs of infection. Abdomen is soft without rigidity guarding or peritoneal signs. Lungs are CTA bilaterally. No respiratory distress. She is tachycardic otherwise cardiac exam normal.  Neck supple with full range of motion. No meningismus or adenopathy. Posterior oropharynx without redness or exudate. TMs benign. She has no neurovascular deficit. CBC shows mild leukocytosis without bandemia. No anemia. Chemistry shows elevated LFTs with a normal total bilirubin. Potassium 3.2 with normal magnesium. Renal function normal.  Lipase normal.      CTs not resulted at time of this note.   As this is the end of my shift the attending physician will

## 2020-07-09 NOTE — ED TRIAGE NOTES
Patient admitted to ED via private car with complaints of difficulty breathing and cough for about a week. Patient went [facility] and was diagnosed with pneumonia in her lower lobes. She was given antibiotics that she has been unable to keep down. She is now having left posterior lung pain and complaining of a banding pain across her chest and a headache. Patient has been tested for COVID-19 four times. Patient's VS are WNL. Patient is alert and oriented x4.

## 2020-07-09 NOTE — CARE COORDINATION
ED follow up call completed. ED visit yesterday to [facility] and [facility]. She is concerned that one facility told her she had pneumonia and the other disagreed. Noted on chart to have been diagnosed with diverticulitis as well. Covid pending. She is still short of breath and anxious today. She stated she was not able to get her cough medicine with codeine that was ordered from the hospital because it was cancelled because they thought she had already been prescribed some. She was previously prescribed a small amount and miss read the directions and used it up to quickly. She stated that she had spoke to her PCP and explained this . She is still in need of the medication. Covid testing is pending. She stated she still feels awful today and has also been vomiting and not keeping her antibiotic down     Patient contacted regarding COVID-19 risk and screening. Discussed COVID-19 related testing which was pending at this time. Test results were pending. Patient informed of results, if available? No.    Care Transition Nurse/ Ambulatory Care Manager contacted the patient by telephone to perform follow-up assessment. Verified name and  with patient as identifiers. Patient has following risk factors of: no known risk factors. Symptoms reviewed with patient who verbalized the following symptoms: fatigue, pain or aching joints, cough and shortness of breath. Due to no new or worsening symptoms encounter was routed to provider for escalation. Education provided regarding infection prevention, and signs and symptoms of COVID-19 and when to seek medical attention with patient who verbalized understanding. Discussed exposure protocols and quarantine from [address] you at higher risk for severe illness  and given an opportunity for questions and concerns. The patient agrees to contact the COVID-19 hotline [phone] or PCP office for questions related to their healthcare. CTN/ACM provided contact information for future reference. From CDC: Are you at higher risk for severe illness?  Wash your hands often.  Avoid close contact (6 feet, which is about two arm lengths) with people who are sick.  Put distance between yourself and other people if COVID-19 is spreading in your community.  Clean and disinfect frequently touched surfaces.  Avoid all cruise travel and non-essential air travel.  Call your healthcare professional if you have concerns about COVID-19 and your underlying condition or if you are sick. For more information on steps you can take to protect yourself, see CDC's How to Protect Yourself      Pt will be further monitored by COVID Loop Team based on severity of symptoms and risk factors.   Additional call from [employer] for monitoring

## 2020-07-09 NOTE — TELEPHONE ENCOUNTER
Pt states she has pneumonia- was seen at [facility] ER and [facility] ER. States she spoke to Kiana Andino last night who wanted her admitted, but they didn't admit her. She is coughing so bad she is vomiting.    Temp is 100.1  Please advise

## 2020-07-10 ENCOUNTER — TELEPHONE (OUTPATIENT)
Dept: INTERNAL MEDICINE CLINIC | Age: 40
End: 2020-07-10

## 2020-07-10 NOTE — TELEPHONE ENCOUNTER
I've tried to call the patient 2 times and the phone rang busy. I will recall her on Monday to get her in to see Dr. Ros Gracia for a same day visit.

## 2020-07-10 NOTE — TELEPHONE ENCOUNTER
Please see if patient is able to be seen by a provider today, even another practice if possible. I do not want her waiting til Tuesday to see me if she is feeling this bad. Please move up appt to today if possible.

## 2020-07-10 NOTE — TELEPHONE ENCOUNTER
Per [place] patient does not need to be called due to recent fever and no open appointments. Patient was advised to go to the ER this morning.

## 2020-07-10 NOTE — TELEPHONE ENCOUNTER
Pt called to say she has been having severe pain in her lungs. Smiley Wills had advised her to go to ER which she did on 7-8-220. She was advised to go to ER again if she continued to have these sx. Pt agreed to go to ER again.

## 2020-07-11 ENCOUNTER — APPOINTMENT (OUTPATIENT)
Dept: GENERAL RADIOLOGY | Age: 40
End: 2020-07-11
Payer: COMMERCIAL

## 2020-07-11 ENCOUNTER — APPOINTMENT (OUTPATIENT)
Dept: CT IMAGING | Age: 40
End: 2020-07-11
Payer: COMMERCIAL

## 2020-07-11 ENCOUNTER — CARE COORDINATION (OUTPATIENT)
Dept: CARE COORDINATION | Age: 40
End: 2020-07-11

## 2020-07-11 ENCOUNTER — HOSPITAL ENCOUNTER (EMERGENCY)
Age: 40
Discharge: HOME | End: 2020-07-11
Attending: EMERGENCY MEDICINE
Payer: COMMERCIAL

## 2020-07-11 VITALS
TEMPERATURE: 99.3 F | BODY MASS INDEX: 33.38 KG/M2 | HEIGHT: 59 IN | OXYGEN SATURATION: 96 % | HEART RATE: 113 BPM | RESPIRATION RATE: 24 BRPM | DIASTOLIC BLOOD PRESSURE: 76 MMHG | SYSTOLIC BLOOD PRESSURE: 120 MMHG | WEIGHT: 165.57 LBS

## 2020-07-11 PROCEDURE — 99284 EMERGENCY DEPT VISIT MOD MDM: CPT

## 2020-07-11 PROCEDURE — 70450 CT HEAD/BRAIN W/O DYE: CPT

## 2020-07-11 PROCEDURE — 71101 X-RAY EXAM UNILAT RIBS/CHEST: CPT

## 2020-07-11 PROCEDURE — 6370000000 HC RX 637 (ALT 250 FOR IP): Performed by: EMERGENCY MEDICINE

## 2020-07-11 RX ORDER — HYDROCODONE BITARTRATE AND ACETAMINOPHEN 5; 325 MG/1; MG/1
1 TABLET ORAL EVERY 4 HOURS PRN
Qty: 18 TABLET | Refills: 0 | Status: SHIPPED | OUTPATIENT
Start: 2020-07-11 | End: 2020-07-14

## 2020-07-11 RX ORDER — ONDANSETRON 4 MG/1
4 TABLET, ORALLY DISINTEGRATING ORAL ONCE
Status: COMPLETED | OUTPATIENT
Start: 2020-07-11 | End: 2020-07-11

## 2020-07-11 RX ORDER — BENZONATATE 100 MG/1
100 CAPSULE ORAL 3 TIMES DAILY PRN
COMMUNITY
End: 2021-06-17

## 2020-07-11 RX ORDER — HYDROCODONE BITARTRATE AND ACETAMINOPHEN 5; 325 MG/1; MG/1
1 TABLET ORAL ONCE
Status: COMPLETED | OUTPATIENT
Start: 2020-07-11 | End: 2020-07-11

## 2020-07-11 RX ORDER — ONDANSETRON 4 MG/1
8 TABLET, ORALLY DISINTEGRATING ORAL ONCE
Status: DISCONTINUED | OUTPATIENT
Start: 2020-07-11 | End: 2020-07-11

## 2020-07-11 RX ADMIN — HYDROCODONE BITARTRATE AND ACETAMINOPHEN 1 TABLET: 5; 325 TABLET ORAL at 21:56

## 2020-07-11 RX ADMIN — ONDANSETRON 4 MG: 4 TABLET, ORALLY DISINTEGRATING ORAL at 21:38

## 2020-07-11 ASSESSMENT — PAIN DESCRIPTION - ORIENTATION
ORIENTATION: LEFT
ORIENTATION: LEFT

## 2020-07-11 ASSESSMENT — PAIN - FUNCTIONAL ASSESSMENT: PAIN_FUNCTIONAL_ASSESSMENT: 0-10

## 2020-07-11 ASSESSMENT — PAIN DESCRIPTION - PAIN TYPE: TYPE: ACUTE PAIN

## 2020-07-11 ASSESSMENT — PAIN SCALES - GENERAL
PAINLEVEL_OUTOF10: 10
PAINLEVEL_OUTOF10: 10
PAINLEVEL_OUTOF10: 8

## 2020-07-11 ASSESSMENT — PAIN DESCRIPTION - LOCATION
LOCATION: RIB CAGE
LOCATION: BACK;RIB CAGE

## 2020-07-11 ASSESSMENT — PAIN DESCRIPTION - DESCRIPTORS: DESCRIPTORS: BURNING

## 2020-07-11 NOTE — CARE COORDINATION
This RN responded to a yellow alert on the loop for symptom monitoring. Conrad Jin RN, 11:42 AM   Good morning [place] and thank you for checking in with the loop today. How are you feeling? It looks like you were in contact with your primary care office yesterday.  If you would like to speak to a nurse, we are available today until 1 pm. Conrad Jin [phone]

## 2020-07-12 NOTE — ED PROVIDER NOTES
[address] [facility] ENCOUNTER      Pt Name: Cordell Darby  MRN: [id number]  [place] 1980  Date of evaluation: 7/11/2020  Provider: Anna K[id number]  Chief Complaint   Patient presents with    Head Injury     [place] and hit 7 wooden steps with head - ribs, abrased area posterior lat L ribs, \"hurts to breath\" slipped on deck \"started pouring and I was wearing my flip flops\" denies dizziness, s/p  shunt placement, also recently diag with \"nodule in my lung, \"       I wore personal protective equipment when I was in the room the entire time. This includes gloves, N95 mask, face shield, and a glove over my stethoscope for protection. HPI  Cordell Darby is a 42 y.o. female who presents with a fall at 7 PM (2 hours prior to arrival) when she slipped on a wet deck and went down 7 steps. She states she hit her head on every step. She also hit her left posterior ribs. She states it hurts to breathe. Movement makes it worse and breathing makes it worse and rest makes it better. She describes her pain as sharp and severe. She denies any bleeding. She denies any other pain. She denies any neck pain, back pain or abdominal pain. She does have some pain that is mild in the lateral left calf but states that it is very mild and she does not feel it is a significant. REVIEW OF SYSTEMS  All systems negative except as noted in the HPI. Reviewed Nurses' notes and concur. Patient's last menstrual period was 10/31/2016 (exact date).     PAST MEDICAL HISTORY  Past Medical History:   Diagnosis Date    ADHD (attention deficit hyperactivity disorder) 82    Anesthesia complication     pt states with general anesthesia, she gets very nauseated and wakes up with a migrain     Depression with anxiety     Difficult intubation     ESBL (extended spectrum beta-lactamase) producing bacteria infection 11/06/2019    urine    Functional ovarian cysts 2008    rt ovary cyst x 2 yrs.  Headache(784.0)     migraines    Hiatal hernia     History of blood transfusion     History of kidney stones     History of PCOS     Hydrocephalus ([address]     Irritable bowel syndrome 2004    had colonoscopy about 6 yrs ago    Kidney stone     Meningitis     Neutrophilic leukocytosis     Nicotine dependence     Primary osteoarthritis of left knee 2016    S/P cone biopsy of cervix 2004    Scoliosis .s     (ventriculoperitoneal) shunt status     hydrocephalus f/w Neurosurgeon [facility]    Wears glasses     reading       FAMILY HISTORY  Family History   Problem Relation Age of Onset    High Blood Pressure Mother     Diabetes Mother     High Cholesterol Mother     Depression Mother     Diabetes Maternal Grandmother     High Blood Pressure Maternal Grandmother     High Cholesterol Maternal Grandmother     Heart Disease Maternal Grandfather     High Blood Pressure Maternal Grandfather     Heart Disease Paternal Grandmother     Stroke Paternal Grandfather     Depression Sister     Cirrhosis Father     Rheum Arthritis Neg Hx     Osteoarthritis Neg Hx     Asthma Neg Hx     Breast Cancer Neg Hx     Cancer Neg Hx     Heart Failure Neg Hx     Hypertension Neg Hx     Migraines Neg Hx     Ovarian Cancer Neg Hx     Rashes/Skin Problems Neg Hx     Seizures Neg Hx     Thyroid Disease Neg Hx        SOCIAL HISTORY   reports that she has been smoking cigarettes. She has a 9.00 pack-year smoking history. She has never used smokeless tobacco. She reports that she does not drink alcohol or use drugs.     SURGICAL HISTORY  Past Surgical History:   Procedure Laterality Date    APPENDECTOMY  2003    appendicitis     SECTION  2005    placenta previa    CHOLECYSTECTOMY      COLONOSCOPY  2004    COLPOSCOPY      CSF SHUNT      replaced at age 17    CYSTOSCOPY      stone removal   [facility]    inguinal    HYSTERECTOMY, TOTAL ABDOMINAL  11/09/2016    TAHBSO, adhesions/PCOS    KNEE ARTHROSCOPY Left 1/7/2015    medial meniscectomy, chondroplasty, plica resection    LITHOTRIPSY Bilateral 12/10/2019    OTHER SURGICAL HISTORY  10/19/2016    op lap    VENTRICULOPERITONEAL SHUNT      multiple revisions, most recent 2015       CURRENT MEDICATIONS  Current Outpatient Rx   Medication Sig Dispense Refill    benzonatate (TESSALON) 100 MG capsule Take 100 mg by mouth 3 times daily as needed for Cough      HYDROcodone-acetaminophen (NORCO) 5-325 MG per tablet Take 1 tablet by mouth every 4 hours as needed for Pain for up to 3 days. Intended supply: 3 days. Take lowest dose possible to manage pain 18 tablet 0    azithromycin (ZITHROMAX) 250 MG tablet Take 1 tablet by mouth See Admin Instructions for 5 days 500mg on day 1 followed by 250mg on days 2 - 5 6 tablet 0    guaiFENesin-codeine (TUSSI-ORGANIDIN NR) 100-10 MG/5ML syrup Take 5 mLs by mouth 3 times daily as needed for Cough or Congestion (every 8 hrs as needed) for up to 14 days. 85 mL 0    famotidine (PEPCID) 10 MG tablet Take 2 tablets by mouth 2 times daily 120 tablet 0    ondansetron (ZOFRAN) 4 MG tablet Take 1 tablet by mouth every 8 hours as needed for Nausea 20 tablet 0       ALLERGIES  Allergies   Allergen Reactions    Bentyl [Dicyclomine Hcl] Shortness Of Breath and Anxiety    Mushroom Extract Complex Anaphylaxis     Can't eat mushrooms.  Sulfa Antibiotics Shortness Of Breath    Vancomycin Anaphylaxis and Hives    Adhesive Tape     Bee Venom Swelling    Hydrocodone Hives    Levaquin [Levofloxacin]     Toradol [Ketorolac Tromethamine] Hives and Itching    Ceftaroline Rash    Daptomycin Swelling and Rash    Dicyclomine Anxiety    Fioricet-Codeine [Butalbital-Apap-Caff-Cod] Anxiety    Zosyn [Piperacillin Sod-Tazobactam So] Hives     Also causes nausea, SOB, dizziness       Tetanus vaccination status reviewed: Td vaccination indicated and refused today.     PHYSICAL EXAM  VITAL SIGNS: /76   Pulse 113   Temp 99.3 °F (37.4 °C) (Oral)   Resp 24   Ht 4' 11\" (1.499 m)   Wt 165 lb 9.1 oz (75.1 kg)   LMP 10/31/2016 (Exact Date)   SpO2 96%   BMI 33.44 kg/m²   Constitutional: Well-developed, well-nourished, appears normal, nontoxic, activity: Sitting comfortably on the cart when I walk in the room, is using her cell phone, not immobilized  HENT: Normocephalic, trauma-mild tenderness of the occiput without swelling or deformity or depression, Bilateral external ears normal, TMs are normal bilaterally. There is no hemotympanum oropharynx moist, no oral injury, Nose normal.  Eyes: PERRLA, EOMI, Conjunctiva normal, atraumatic. Neck: Normal range of motion after cleared with exam, no midline or paraspinal cervical tenderness, Supple,  Lymphatic: No lymphadenopathy noted. Cardiovascular: Mildly tachycardic heart rate, Normal rhythm, no murmurs, no gallops, no rubs. Thorax & Lungs: normal breath sounds, no respiratory distress, no wheezing, no rales, no rhonchi, there is moderate tenderness noted over the posterior lateral lower right ribs. There is point tenderness here there is a 3 cm x 4 cm abrasion over ribs 8 through 10 posterior lateral  Abdomen: Soft, no tender with no guarding, no rebound, no rigidity; no distension, no masses, no pulsatile masses, no hepatosplenomegaly, normal bowel sounds  Skin: Warm, Dry, No erythema, No rash. Back: No midline spinal tenderness, normal range of motion, No scoliosis. Extremities:  neurovascular intact, no deformity, no swelling, no erythema, no lacerations noted, no amputations, no cyanosis, no mottling, no ecchymosis, no tenderness of any extremity of the lateral left calf, capillary refill less than 2 seconds. Musculoskeletal: Good range of motion in all major joints except as noted above, No tenderness to palpation or major deformities except as noted above.   Neurologic: Alert & oriented x 3, CN II through XII are intact, normal motor function, normal sensory function, no focal deficits noted, DTRs are 2+/4. Psychiatric: Affect normal, Judgment normal, Mood normal.    LABORATORY  Labs Reviewed - No data to display      RADIOLOGY/PROCEDURES  I personally reviewed the images for this case. CT Head WO Contrast   Final Result   No acute intracranial abnormality. XR RIBS LEFT INCLUDE CHEST (MIN 3 VIEWS)   Final Result   No acute cardiopulmonary findings. Questionable nondisplaced posterior left 6th rib fracture. Recommend   correlation focal area of pain. COURSE & MEDICAL DECISION MAKING  Pertinent Labs & Imaging studies reviewed. (See chart for details)    Vitals:    07/11/20 2053 07/11/20 2201   BP: (!) 124/96 120/76   Pulse: 113    Resp: 24    Temp: 99.3 °F (37.4 °C)    TempSrc: Oral    SpO2: 96%    Weight: 165 lb 9.1 oz (75.1 kg)    Height: 4' 11\" (1.499 m)        Medications   ondansetron (ZOFRAN-ODT) disintegrating tablet 4 mg (4 mg Oral Given 7/11/20 2138)   HYDROcodone-acetaminophen (NORCO) 5-325 MG per tablet 1 tablet (1 tablet Oral Given 7/11/20 2156)       Discharge Medication List as of 7/11/2020  9:57 PM      START taking these medications    Details   HYDROcodone-acetaminophen (NORCO) 5-325 MG per tablet Take 1 tablet by mouth every 4 hours as needed for Pain for up to 3 days. Intended supply: 3 days. Take lowest dose possible to manage pain, Disp-18 tablet,R-0Print             SEP-1 CORE MEASURE DATA  Exclusion criteria: the patient is NOT to be included for sepsis due to: Infection is not suspected    Patient remained stable in the ED. x-rays of her chest revealed fracture of rib on the left side specifically #6. Therefore, patient was treated with pain medications. She was instructed to follow with her doctor in 2 to 3 days and return to emerge department for any problems. She was given instructions for head injury.     The patient's blood pressure was found to be elevated according to CMS/Medicare and the Affordable Care Act/[facility] criteria. Elevated blood pressure could occur because of pain or anxiety or other reasons and does not mean that they need to have their blood pressure treated or medications otherwise adjusted. However, this could also be a sign that they will need to have their blood pressure treated or medications changed. The patient was instructed to follow up closely with their personal physician to have their blood pressure rechecked. The patient was instructed to take a list of recent blood pressure readings to their next visit with their personal physician. See discharge instructions for specific medications, discharge information, and treatments. They were verbally instructed to return to emergency if any problems. (This chart has been completed using [address]. Although attempts have been made to ensure accuracy, words and/or phrases may not be transcribed as intended.)    Patient requested pain medicines at the time of their exam.    IMPRESSION(S):  1. Closed fracture of one rib of left side, initial encounter    2. Fall down steps, initial encounter    3. Closed head injury, initial encounter        ? Recheck Times: 2145    Diagnostic considerations include but are not limited to: Arterial Injury/Ischemia, Fracture, Dislocation, Infection, Compartment Syndrome, Neurologic Deficit/Injury.        [facility], DO  07/12/20 1425

## 2020-07-12 NOTE — ED NOTES
During triage, when patient questioned re: DPT stated, \"I'm overdue but I don't want one, they hurt.  I'll get one in a few days\"     Jordan Berrios RN  07/11/20 2120

## 2020-07-13 ENCOUNTER — CARE COORDINATION (OUTPATIENT)
Dept: FAMILY MEDICINE CLINIC | Age: 40
End: 2020-07-13

## 2020-07-13 NOTE — CARE COORDINATION
Comment alert noted in Loop remote symptom monitoring program. Messaged patient to notify RN if she needs a call back and provided contact information after patient responded to loop question from 7/11/20. Patient response to message from 7/11/20 received today at 10:00am: \"I was Feeling better but I fell down my moms steps and broke my ribs so now it really hurts to breath but I have A appointment tomorrow with my doctor. They also found a nodule in my lung so Im wondering if thats what has caused all of my breathing problems. \"        RN reply:   Danny Ceron is not working today. I am glad you scheduled an appointment with you Physician. Sorry to hear about your fall. I am available 8-4 today if you need to talk with a nurse.   Wendi Shelton [phone]      Patient response at 10:41 am: \"Ok thank you so much\"    Nikolas Stinson RN, MSN  Ambulatory Care Manager  [phone]

## 2020-07-13 NOTE — TELEPHONE ENCOUNTER
Spoke with neelam asked if can get a CT scan or xray of shoulder was not bothering her when there. Notified can not give phenergan not a long term med. Noted recent script. Ensure to take pain med with food. Cancelled tomorrow appt due to acute visit due to illness ast week.

## 2020-07-13 NOTE — TELEPHONE ENCOUNTER
Called Petrona and she said minimal cough. Said fell down stairs at moms stairs in rain and went to ER fx left ribs. Fever sat was low 99 no fever since then. C/o left shoulder pain said that shell discuss with you tomorrow. Worried about nodule in lung see oncology on July 20 th. Petrona asked if she can have a script for phenergan that they gave her pain medication and it is making her nauseated. They gave her Zofran and its not helping with the nausea.

## 2020-08-03 ENCOUNTER — TELEPHONE (OUTPATIENT)
Dept: INTERNAL MEDICINE CLINIC | Age: 40
End: 2020-08-03

## 2020-08-03 NOTE — TELEPHONE ENCOUNTER
I agree, patient needs to go to the ER. Also, please inform patient that I would recommend her establish with Dr. Yanet Mercedes soon, as she has a complexity of medical conditions that will need closely monitored.

## 2020-08-03 NOTE — TELEPHONE ENCOUNTER
Pt fell at the end of last moth and broke a rib on the left back side. States she is still having rib pain. Pt states she is having headache where she fell. Pt has shunt. Had a ct done and was told it was normal. Blurred vision. States she is now having balance issues and has been falling. Informed that jody is out of the office currently.       Please advise

## 2020-08-10 ENCOUNTER — HOSPITAL ENCOUNTER (EMERGENCY)
Age: 40
Discharge: HOME | End: 2020-08-10
Payer: COMMERCIAL

## 2020-08-10 ENCOUNTER — TELEPHONE (OUTPATIENT)
Dept: INTERNAL MEDICINE CLINIC | Age: 40
End: 2020-08-10

## 2020-08-10 ENCOUNTER — APPOINTMENT (OUTPATIENT)
Dept: GENERAL RADIOLOGY | Age: 40
End: 2020-08-10
Payer: COMMERCIAL

## 2020-08-10 VITALS
TEMPERATURE: 98.5 F | HEART RATE: 79 BPM | SYSTOLIC BLOOD PRESSURE: 101 MMHG | OXYGEN SATURATION: 98 % | RESPIRATION RATE: 16 BRPM | DIASTOLIC BLOOD PRESSURE: 69 MMHG

## 2020-08-10 PROCEDURE — 99283 EMERGENCY DEPT VISIT LOW MDM: CPT

## 2020-08-10 PROCEDURE — 71100 X-RAY EXAM RIBS UNI 2 VIEWS: CPT

## 2020-08-10 PROCEDURE — 6370000000 HC RX 637 (ALT 250 FOR IP): Performed by: PHYSICIAN ASSISTANT

## 2020-08-10 RX ORDER — HYDROCODONE BITARTRATE AND ACETAMINOPHEN 5; 325 MG/1; MG/1
1 TABLET ORAL ONCE
Status: COMPLETED | OUTPATIENT
Start: 2020-08-10 | End: 2020-08-10

## 2020-08-10 RX ORDER — HYDROCODONE BITARTRATE AND ACETAMINOPHEN 5; 325 MG/1; MG/1
1 TABLET ORAL EVERY 6 HOURS PRN
Qty: 6 TABLET | Refills: 0 | Status: SHIPPED | OUTPATIENT
Start: 2020-08-10 | End: 2020-08-12

## 2020-08-10 RX ORDER — NAPROXEN 250 MG/1
500 TABLET ORAL ONCE
Status: COMPLETED | OUTPATIENT
Start: 2020-08-10 | End: 2020-08-10

## 2020-08-10 RX ADMIN — NAPROXEN 500 MG: 250 TABLET ORAL at 19:07

## 2020-08-10 RX ADMIN — HYDROCODONE BITARTRATE AND ACETAMINOPHEN 1 TABLET: 5; 325 TABLET ORAL at 19:07

## 2020-08-10 ASSESSMENT — ENCOUNTER SYMPTOMS
BACK PAIN: 0
ABDOMINAL PAIN: 0
SHORTNESS OF BREATH: 0
DIARRHEA: 0
VOMITING: 0
COUGH: 0
EYE PAIN: 0
NAUSEA: 0

## 2020-08-10 ASSESSMENT — PAIN DESCRIPTION - PROGRESSION: CLINICAL_PROGRESSION: GRADUALLY WORSENING

## 2020-08-10 ASSESSMENT — PAIN DESCRIPTION - DESCRIPTORS: DESCRIPTORS: ACHING;CONSTANT

## 2020-08-10 ASSESSMENT — PAIN SCALES - WONG BAKER: WONGBAKER_NUMERICALRESPONSE: 8

## 2020-08-10 ASSESSMENT — PAIN SCALES - GENERAL
PAINLEVEL_OUTOF10: 8
PAINLEVEL_OUTOF10: 8
PAINLEVEL_OUTOF10: 5

## 2020-08-10 ASSESSMENT — PAIN DESCRIPTION - PAIN TYPE: TYPE: ACUTE PAIN

## 2020-08-10 ASSESSMENT — PAIN DESCRIPTION - FREQUENCY: FREQUENCY: CONTINUOUS

## 2020-08-10 ASSESSMENT — PAIN DESCRIPTION - ONSET: ONSET: SUDDEN

## 2020-08-10 ASSESSMENT — PAIN DESCRIPTION - LOCATION: LOCATION: RIB CAGE

## 2020-08-10 NOTE — TELEPHONE ENCOUNTER
PT calling in regarding her broken ribs. PT says she fell out of bed last night and is wanting to know if she could have possibly broke the ribs again due to the fall or could it possibly just be pain due to the original injury.

## 2020-08-10 NOTE — ED PROVIDER NOTES
[address] [facility]        Pt Name: Sakina Weinstein  MRN: [id number]  [place] 1980  Date of evaluation: 8/10/2020  Provider: CRISPIN Pardo  PCP: FREDRICK Rosas CNP    Evaluation by ALDAIR. My supervising physician was available for consultation. CHIEF COMPLAINT       Chief Complaint   Patient presents with    Fall     rib pain and bleeding around silva       HISTORY OF PRESENT ILLNESS   (Location, Timing/Onset, Context/Setting, Quality, Duration, Modifying Factors, Severity, Associated Signs and Symptoms)  Note limiting factors. Sakina Weinstein is a 42 y.o. female who presents to the emergency department after mechanical fall in the shower. Patient states that she accidentally slipped on some conditioner on the floor, landing onto the left side of her ribs. She also has a Silva catheter, was placed on 8/3 at [facility] for stones. She is followed by urology. Noticed that she has a little bit of bleeding at the urethra after the fall. The Silva catheter is still draining urine. There is no hematuria. She denies urethral pain or vaginal or pelvic or abdominal pain. She reports severe 10 out of 10 left-sided rib pain that radiates into her back. She denies head injury or loss of consciousness. She reports a history of rib fractures in the past and is concerned that she fractured her ribs again. Worse with movement and touch, better with rest.    Nursing Notes were all reviewed and agreed with or any disagreements were addressed in the HPI. REVIEW OF SYSTEMS    (2-9 systems for level 4, 10 or more for level 5)     Review of Systems   Constitutional: Negative for chills, fatigue and fever. Eyes: Negative for pain. Respiratory: Negative for cough and shortness of breath. Cardiovascular: Negative for chest pain. Gastrointestinal: Negative for abdominal pain, diarrhea, nausea and vomiting.    Genitourinary: Negative for dysuria. Musculoskeletal: Negative for back pain, neck pain and neck stiffness. Skin: Negative for rash. Neurological: Negative for dizziness and headaches. Psychiatric/Behavioral: Negative for confusion. Positives and Pertinent negatives as per HPI. Except as noted above in the ROS, all other systems were reviewed and negative. PAST MEDICAL HISTORY     Past Medical History:   Diagnosis Date    ADHD (attention deficit hyperactivity disorder) 82    Anesthesia complication     pt states with general anesthesia, she gets very nauseated and wakes up with a migrain     Depression with anxiety     Difficult intubation     ESBL (extended spectrum beta-lactamase) producing bacteria infection 2019    urine    Functional ovarian cysts 2008    rt ovary cyst x 2 yrs.     Headache(784.0)     migraines    Hiatal hernia     History of blood transfusion     History of kidney stones     History of PCOS     Hydrocephalus (HCC)     Irritable bowel syndrome     had colonoscopy about 6 yrs ago    Kidney stone     Meningitis     Neutrophilic leukocytosis     Nicotine dependence     Primary osteoarthritis of left knee 2016    S/P cone biopsy of cervix 2004    Scoliosis 1990.s     (ventriculoperitoneal) shunt status     hydrocephalus f/w Neurosurgeon at [place] [address] Wears glasses     reading         SURGICAL HISTORY     Past Surgical History:   Procedure Laterality Date    APPENDECTOMY  2003    appendicitis     SECTION  2005    placenta previa    CHOLECYSTECTOMY      COLONOSCOPY  2004    COLPOSCOPY  2004    CSF SHUNT      replaced at age 17    CYSTOSCOPY      stone removal   [facility]    inguinal    HYSTERECTOMY, TOTAL ABDOMINAL  2016    TAHBSO, adhesions/PCOS    KNEE ARTHROSCOPY Left 2015    medial meniscectomy, chondroplasty, plica resection    LITHOTRIPSY Bilateral 12/10/2019    OTHER SURGICAL HISTORY  10/19/2016    op lap    VENTRICULOPERITONEAL SHUNT      multiple revisions, most recent 2015         CURRENTMEDICATIONS       Discharge Medication List as of 8/10/2020  7:49 PM      CONTINUE these medications which have NOT CHANGED    Details   benzonatate (TESSALON) 100 MG capsule Take 100 mg by mouth 3 times daily as needed for CoughHistorical Med      famotidine (PEPCID) 10 MG tablet Take 2 tablets by mouth 2 times daily, Disp-120 tablet, R-0Print      ondansetron (ZOFRAN) 4 MG tablet Take 1 tablet by mouth every 8 hours as needed for Nausea, Disp-20 tablet, R-0Normal               ALLERGIES     Bentyl [dicyclomine hcl]; Mushroom extract complex; Sulfa antibiotics; Vancomycin; Adhesive tape; Bee venom; Levaquin [levofloxacin]; Toradol [ketorolac tromethamine]; Ceftaroline; Daptomycin; Dicyclomine; Fioricet-codeine [butalbital-apap-caff-cod];  Oxycodone-acetaminophen; and Zosyn [piperacillin sod-tazobactam so]    FAMILYHISTORY       Family History   Problem Relation Age of Onset    High Blood Pressure Mother     Diabetes Mother     High Cholesterol Mother     Depression Mother     Diabetes Maternal Grandmother     High Blood Pressure Maternal Grandmother     High Cholesterol Maternal Grandmother     Heart Disease Maternal Grandfather     High Blood Pressure Maternal Grandfather     Heart Disease Paternal Grandmother     Stroke Paternal Grandfather     Depression Sister     Cirrhosis Father     Rheum Arthritis Neg Hx     Osteoarthritis Neg Hx     Asthma Neg Hx     Breast Cancer Neg Hx     Cancer Neg Hx     Heart Failure Neg Hx     Hypertension Neg Hx     Migraines Neg Hx     Ovarian Cancer Neg Hx     Rashes/Skin Problems Neg Hx     Seizures Neg Hx     Thyroid Disease Neg Hx           SOCIAL HISTORY       Social History     Tobacco Use    Smoking status: Current Some Day Smoker     Packs/day: 0.50     Years: 18.00     Pack years: 9.00     Types: Cigarettes    Smokeless tobacco: Never Used   Substance Use Topics    Alcohol use: No     Alcohol/week: 0.0 standard drinks    Drug use: No       SCREENINGS             PHYSICAL EXAM    (up to 7 for level 4, 8 or more for level 5)     ED Triage Vitals [08/10/20 1823]   BP Temp Temp Source Pulse Resp SpO2 Height Weight   117/81 98.5 °F (36.9 °C) Oral 110 18 100 % -- --       Physical Exam  Vitals signs and nursing note reviewed. Constitutional:       General: She is not in acute distress. Appearance: She is well-developed. She is not diaphoretic. HENT:      Head: Normocephalic and atraumatic. Eyes:      General:         Right eye: No discharge. Left eye: No discharge. Neck:      Musculoskeletal: Normal range of motion and neck supple. Pulmonary:      Effort: Pulmonary effort is normal. No respiratory distress. Breath sounds: No stridor. Comments: Wilton Divine with to palpation along the left lateral rib region, no focal bony abnormalities palpated, no ecchymosis or swelling  Musculoskeletal: Normal range of motion. Skin:     General: Skin is warm and dry. Coloration: Skin is not pale. Neurological:      Mental Status: She is alert and oriented to person, place, and time. Comments: No gross facial drooping. Moves all 4 extremities spontaneously. Psychiatric:         Behavior: Behavior normal.         DIAGNOSTIC RESULTS   LABS:    Labs Reviewed - No data to display    All other labs were within normal range or not returned as of this dictation. EKG: All EKG's are interpreted by the Emergency Department Physician in the absence of a cardiologist.  Please see their note for interpretation of EKG.       RADIOLOGY:   Non-plain film images such as CT, Ultrasound and MRI are read by the radiologist. Plain radiographic images are visualized and preliminarily interpreted by the ED Provider with the below findings:        Interpretation per the Radiologist below, if available at the time of this note:    XR RIBS LEFT (2 VIEWS)   Final Result Suboptimal expiratory phase respiration chest radiograph with no definite   radiographic evidence of acute cardiopulmonary disease. Acute, nondisplaced fractures lateral left ribs 6 and 7. Bone scan correlation may be considered if pain persists or worsens, or if   clinically there is concern for underlying radiographically occult process. No results found. PROCEDURES   Unless otherwise noted below, none     Procedures    CRITICAL CARE TIME   N/A    CONSULTS:  None      EMERGENCY DEPARTMENT COURSE and DIFFERENTIAL DIAGNOSIS/MDM:   Vitals:    Vitals:    08/10/20 1823 08/10/20 1930   BP: 117/81 101/69   Pulse: 110 79   Resp: 18 16   Temp: 98.5 °F (36.9 °C)    TempSrc: Oral    SpO2: 100% 98%       Patient was given the following medications:  Medications   naproxen (NAPROSYN) tablet 500 mg (500 mg Oral Given 8/10/20 1907)   HYDROcodone-acetaminophen (NORCO) 5-325 MG per tablet 1 tablet (1 tablet Oral Given 8/10/20 1907)           Fall Differential Diagnosis: Cardiac Arrhythmia, Stroke, Sepsis/Infection, Anemia, Fracture, Dislocation, Epidural Hematoma, Subdural Hematoma, Parenchymal Brain contusion or bleed, Subarachnoid hemorrhage, Skull Fracture, Neck Fracture or Dislocation, other. Patient seen and examined today for rib pain s/p mechanical fall. See HPI for patient presentation. Patient is in no acute distress, nontoxic, afebrile with unremarkable vital signs. There was no head injury. No neck or back pain. He has acute nondisplaced fractures of the lateral left ribs 6 and 7. Otherwise no acute cardiopulmonary process. Located as detailed above and at reevaluation she feels improved. She already has an incentive spirometer at home that she was instructed to use.   I estimate there is LOW risk for SKULL FRACTURE, SUBARACHNOID HEMORRHAGE, INTRACRANIAL HEMORRHAGE, CERVICAL SPINE INJURY, TRAUMATIC BRAIN INJURY, SUBDURAL OR EPIDURAL HEMATOMA,  thus I consider the discharge disposition reasonable. At this time I believe patient's presentation does not warrant further workup with labs or imaging in the emergency department and is stable for discharge home. I discussed with Shanthi Garcia and/or family the exam results, diagnosis, care, prognosis, reasons to return to the emergency department, and the importance of follow up with primary care provider in 24-48 hours. They verbalized understanding and were discharged in stable condition. Shanthi Garcia is well appearing, non-toxic, and afebrile at the time of discharge. FINAL IMPRESSION      1. Fall, initial encounter    2. Closed fracture of multiple ribs of left side, initial encounter          DISPOSITION/PLAN   DISPOSITION        PATIENT REFERREDTO:  Ritesh Paul, APRN - CNP  [address] [facility]  [phone]      ED follow up    [facility] Emergency Department  [address] [facility]  [phone]    If symptoms worsen      DISCHARGE MEDICATIONS:  Discharge Medication List as of 8/10/2020  7:49 PM      START taking these medications    Details   HYDROcodone-acetaminophen (NORCO) 5-325 MG per tablet Take 1 tablet by mouth every 6 hours as needed for Pain for up to 2 days. , Disp-6 tablet,R-0Print             DISCONTINUED MEDICATIONS:  Discharge Medication List as of 8/10/2020  7:49 PM                 (Please note that portions of this note were completed with a voice recognition program.  Efforts were made to edit the dictations but occasionally words are mis-transcribed.)    Margarette Schirmer, PA (electronically signed)            Margarette Schirmer, PA  08/10/20 7658

## 2020-08-11 ENCOUNTER — CARE COORDINATION (OUTPATIENT)
Dept: CARE COORDINATION | Age: 40
End: 2020-08-11

## 2020-08-11 NOTE — TELEPHONE ENCOUNTER
PT went to [facility] [place] [facility] [place] 08/10/20 they made PT aware that she did have multiple rib fractures from her fall.

## 2020-08-11 NOTE — CARE COORDINATION
Patient contacted regarding recent discharge and COVID-19 risk. Discussed COVID-19 related testing which was not done at this time. Test results were not done. Patient informed of results, if available? N/A     Care Transition Nurse/ Ambulatory Care Manager contacted the patient by telephone to perform post discharge assessment. Verified name and  with patient as identifiers. She is not breathless on the phone today. Denies fever, chills, or other symptoms concerning for COVID-19. She has been tested multiple times for COVID; all negative. She states that she called her Urologist at [facility] and is waiting on a call back. She states that she has an indwelling catheter and thinks that she has a UTI. Patient has following risk factors of: Obesity. CTN/ACM reviewed discharge instructions, medical action plan and red flags related to discharge diagnosis. Reviewed and educated them on any new and changed medications related to discharge diagnosis. Advised obtaining a 90-day supply of all daily and as-needed medications. Education provided regarding infection prevention, and signs and symptoms of COVID-19 and when to seek medical attention with patient who verbalized understanding. Discussed exposure protocols and quarantine from [address] you at higher risk for severe illness 2019 and given an opportunity for questions and concerns. The patient agrees to contact the COVID-19 hotline [phone] or PCP office for questions related to their healthcare. CTN/ACM provided contact information for future reference. From CDC: Are you at higher risk for severe illness?  Wash your hands often.  Avoid close contact (6 feet, which is about two arm lengths) with people who are sick.  Put distance between yourself and other people if COVID-19 is spreading in your community.  Clean and disinfect frequently touched surfaces.    Avoid all cruise travel and non-essential air travel.  Call your healthcare professional if you have concerns about COVID-19 and your underlying condition or if you are sick. For more information on steps you can take to protect yourself, see CDC's How to [address] [place] [address] for follow-up call in 7-14 days based on severity of symptoms and risk factors. No future appointments. Charles EDMONDSON, RN  Ambulatory Care Manager  [phone]  [email or web address]

## 2020-08-25 ENCOUNTER — CARE COORDINATION (OUTPATIENT)
Dept: CARE COORDINATION | Age: 40
End: 2020-08-25

## 2020-08-25 NOTE — CARE COORDINATION
You Patient resolved from the Care Transitions episode on 8/25/20  Discussed COVID-19 related testing which was not done at this time. Test results were not done. Patient informed of results, if available? N/A    Patient/family has been provided the following resources and education related to COVID-19:                         Signs, symptoms and red flags related to COVID-19            CDC exposure and quarantine guidelines            [facility] exposure contact - [phone]            Contact for their local Department of Health                 Patient currently reports that the following symptoms have improved:  14 day outreach; left a final VM with ACM call back information. Of note, she has had 2 recent ED visits at other hospitals since last outreach. No further outreach scheduled with this CTN/ACM. Episode of Care resolved. Patient has this CTN/ACM contact information if future needs arise. No future appointments. Narinder Wu MSN, RN  Ambulatory Care Manager  [phone]  [email or web address]

## 2021-05-30 ENCOUNTER — APPOINTMENT (OUTPATIENT)
Dept: CT IMAGING | Age: 41
End: 2021-05-30
Payer: COMMERCIAL

## 2021-05-30 ENCOUNTER — APPOINTMENT (OUTPATIENT)
Dept: GENERAL RADIOLOGY | Age: 41
End: 2021-05-30
Payer: COMMERCIAL

## 2021-05-30 ENCOUNTER — HOSPITAL ENCOUNTER (EMERGENCY)
Age: 41
Discharge: ANOTHER HOSPITAL | End: 2021-05-31
Attending: EMERGENCY MEDICINE
Payer: COMMERCIAL

## 2021-05-30 DIAGNOSIS — Z98.2 VP (VENTRICULOPERITONEAL) SHUNT STATUS: ICD-10-CM

## 2021-05-30 DIAGNOSIS — R51.9 ACUTE NONINTRACTABLE HEADACHE, UNSPECIFIED HEADACHE TYPE: Primary | ICD-10-CM

## 2021-05-30 PROCEDURE — 6370000000 HC RX 637 (ALT 250 FOR IP): Performed by: NURSE PRACTITIONER

## 2021-05-30 PROCEDURE — 84145 PROCALCITONIN (PCT): CPT

## 2021-05-30 PROCEDURE — 99284 EMERGENCY DEPT VISIT MOD MDM: CPT

## 2021-05-30 PROCEDURE — 70450 CT HEAD/BRAIN W/O DYE: CPT

## 2021-05-30 PROCEDURE — 74018 RADEX ABDOMEN 1 VIEW: CPT

## 2021-05-30 PROCEDURE — 80053 COMPREHEN METABOLIC PANEL: CPT

## 2021-05-30 PROCEDURE — 96376 TX/PRO/DX INJ SAME DRUG ADON: CPT

## 2021-05-30 PROCEDURE — 36415 COLL VENOUS BLD VENIPUNCTURE: CPT

## 2021-05-30 PROCEDURE — 83605 ASSAY OF LACTIC ACID: CPT

## 2021-05-30 PROCEDURE — 96375 TX/PRO/DX INJ NEW DRUG ADDON: CPT

## 2021-05-30 PROCEDURE — 96365 THER/PROPH/DIAG IV INF INIT: CPT

## 2021-05-30 RX ORDER — ONDANSETRON 2 MG/ML
4 INJECTION INTRAMUSCULAR; INTRAVENOUS ONCE
Status: DISCONTINUED | OUTPATIENT
Start: 2021-05-30 | End: 2021-05-30

## 2021-05-30 RX ORDER — MORPHINE SULFATE 4 MG/ML
4 INJECTION, SOLUTION INTRAMUSCULAR; INTRAVENOUS ONCE
Status: COMPLETED | OUTPATIENT
Start: 2021-05-30 | End: 2021-05-31

## 2021-05-30 RX ORDER — ONDANSETRON 4 MG/1
8 TABLET, ORALLY DISINTEGRATING ORAL ONCE
Status: COMPLETED | OUTPATIENT
Start: 2021-05-30 | End: 2021-05-30

## 2021-05-30 RX ADMIN — ONDANSETRON 8 MG: 4 TABLET, ORALLY DISINTEGRATING ORAL at 22:23

## 2021-05-30 ASSESSMENT — PAIN DESCRIPTION - LOCATION: LOCATION: HEAD

## 2021-05-30 ASSESSMENT — ENCOUNTER SYMPTOMS
VOMITING: 0
SHORTNESS OF BREATH: 0
NAUSEA: 1
BACK PAIN: 0
ABDOMINAL PAIN: 0
COLOR CHANGE: 1

## 2021-05-30 ASSESSMENT — PAIN SCALES - GENERAL: PAINLEVEL_OUTOF10: 8

## 2021-05-31 ENCOUNTER — HOSPITAL ENCOUNTER (INPATIENT)
Age: 41
LOS: 4 days | Discharge: HOME | DRG: 058 | End: 2021-06-04
Attending: INTERNAL MEDICINE | Admitting: INTERNAL MEDICINE
Payer: COMMERCIAL

## 2021-05-31 VITALS
WEIGHT: 157.63 LBS | TEMPERATURE: 98.1 F | BODY MASS INDEX: 31.84 KG/M2 | SYSTOLIC BLOOD PRESSURE: 120 MMHG | HEART RATE: 89 BPM | RESPIRATION RATE: 16 BRPM | DIASTOLIC BLOOD PRESSURE: 85 MMHG | OXYGEN SATURATION: 99 %

## 2021-05-31 DIAGNOSIS — G43.119 INTRACTABLE MIGRAINE WITH AURA WITHOUT STATUS MIGRAINOSUS: Primary | ICD-10-CM

## 2021-05-31 PROBLEM — R51.9 HEADACHE: Status: ACTIVE | Noted: 2021-05-31

## 2021-05-31 LAB
A/G RATIO: 1.1 (ref 1.1–2.2)
ALBUMIN SERPL-MCNC: 4.2 G/DL (ref 3.4–5)
ALP BLD-CCNC: 99 U/L (ref 40–129)
ALT SERPL-CCNC: 17 U/L (ref 10–40)
ANION GAP SERPL CALCULATED.3IONS-SCNC: 14 MMOL/L (ref 3–16)
AST SERPL-CCNC: 35 U/L (ref 15–37)
BASOPHILS ABSOLUTE: 0.2 K/UL (ref 0–0.2)
BASOPHILS RELATIVE PERCENT: 1.2 %
BILIRUB SERPL-MCNC: <0.2 MG/DL (ref 0–1)
BUN BLDV-MCNC: 11 MG/DL (ref 7–20)
CALCIUM SERPL-MCNC: 9.3 MG/DL (ref 8.3–10.6)
CHLORIDE BLD-SCNC: 102 MMOL/L (ref 99–110)
CO2: 21 MMOL/L (ref 21–32)
CREAT SERPL-MCNC: 0.7 MG/DL (ref 0.6–1.1)
EOSINOPHILS ABSOLUTE: 0.1 K/UL (ref 0–0.6)
EOSINOPHILS RELATIVE PERCENT: 1.1 %
GFR AFRICAN AMERICAN: >60
GFR NON-AFRICAN AMERICAN: >60
GLOBULIN: 3.7 G/DL
GLUCOSE BLD-MCNC: 123 MG/DL (ref 70–99)
HCT VFR BLD CALC: 42.7 % (ref 36–48)
HEMOGLOBIN: 14.9 G/DL (ref 12–16)
LACTIC ACID: 1.1 MMOL/L (ref 0.4–2)
LYMPHOCYTES ABSOLUTE: 3.1 K/UL (ref 1–5.1)
LYMPHOCYTES RELATIVE PERCENT: 22.4 %
MCH RBC QN AUTO: 29 PG (ref 26–34)
MCHC RBC AUTO-ENTMCNC: 35 G/DL (ref 31–36)
MCV RBC AUTO: 82.9 FL (ref 80–100)
MONOCYTES ABSOLUTE: 0.7 K/UL (ref 0–1.3)
MONOCYTES RELATIVE PERCENT: 5.2 %
NEUTROPHILS ABSOLUTE: 9.6 K/UL (ref 1.7–7.7)
NEUTROPHILS RELATIVE PERCENT: 70.1 %
PDW BLD-RTO: 14.2 % (ref 12.4–15.4)
PLATELET # BLD: 293 K/UL (ref 135–450)
PMV BLD AUTO: 7.3 FL (ref 5–10.5)
POTASSIUM REFLEX MAGNESIUM: 5.3 MMOL/L (ref 3.5–5.1)
PROCALCITONIN: 0.03 NG/ML (ref 0–0.15)
RBC # BLD: 5.15 M/UL (ref 4–5.2)
REASON FOR REJECTION: NORMAL
REJECTED TEST: NORMAL
SEDIMENTATION RATE, ERYTHROCYTE: 17 MM/HR (ref 0–20)
SODIUM BLD-SCNC: 137 MMOL/L (ref 136–145)
TOTAL PROTEIN: 7.9 G/DL (ref 6.4–8.2)
WBC # BLD: 13.7 K/UL (ref 4–11)

## 2021-05-31 PROCEDURE — 96365 THER/PROPH/DIAG IV INF INIT: CPT

## 2021-05-31 PROCEDURE — 6370000000 HC RX 637 (ALT 250 FOR IP): Performed by: NURSE PRACTITIONER

## 2021-05-31 PROCEDURE — 85025 COMPLETE CBC W/AUTO DIFF WBC: CPT

## 2021-05-31 PROCEDURE — 6360000002 HC RX W HCPCS: Performed by: INTERNAL MEDICINE

## 2021-05-31 PROCEDURE — 85652 RBC SED RATE AUTOMATED: CPT

## 2021-05-31 PROCEDURE — 6360000002 HC RX W HCPCS: Performed by: NURSE PRACTITIONER

## 2021-05-31 PROCEDURE — 2580000003 HC RX 258: Performed by: INTERNAL MEDICINE

## 2021-05-31 PROCEDURE — 96375 TX/PRO/DX INJ NEW DRUG ADDON: CPT

## 2021-05-31 PROCEDURE — 6370000000 HC RX 637 (ALT 250 FOR IP): Performed by: INTERNAL MEDICINE

## 2021-05-31 PROCEDURE — 96376 TX/PRO/DX INJ SAME DRUG ADON: CPT

## 2021-05-31 PROCEDURE — 1200000000 HC SEMI PRIVATE

## 2021-05-31 RX ORDER — ONDANSETRON 2 MG/ML
4 INJECTION INTRAMUSCULAR; INTRAVENOUS EVERY 6 HOURS PRN
Status: DISCONTINUED | OUTPATIENT
Start: 2021-05-31 | End: 2021-06-04 | Stop reason: HOSPADM

## 2021-05-31 RX ORDER — POLYETHYLENE GLYCOL 3350 17 G/17G
17 POWDER, FOR SOLUTION ORAL DAILY PRN
Status: DISCONTINUED | OUTPATIENT
Start: 2021-05-31 | End: 2021-06-04 | Stop reason: HOSPADM

## 2021-05-31 RX ORDER — LORAZEPAM 2 MG/ML
0.5 INJECTION INTRAMUSCULAR EVERY 4 HOURS PRN
Status: DISCONTINUED | OUTPATIENT
Start: 2021-05-31 | End: 2021-06-04 | Stop reason: HOSPADM

## 2021-05-31 RX ORDER — PROMETHAZINE HYDROCHLORIDE 25 MG/1
25 TABLET ORAL ONCE
Status: COMPLETED | OUTPATIENT
Start: 2021-05-31 | End: 2021-05-31

## 2021-05-31 RX ORDER — SODIUM CHLORIDE 9 MG/ML
INJECTION, SOLUTION INTRAVENOUS CONTINUOUS
Status: DISCONTINUED | OUTPATIENT
Start: 2021-05-31 | End: 2021-06-02

## 2021-05-31 RX ORDER — MORPHINE SULFATE 2 MG/ML
2 INJECTION, SOLUTION INTRAMUSCULAR; INTRAVENOUS
Status: DISCONTINUED | OUTPATIENT
Start: 2021-05-31 | End: 2021-06-01

## 2021-05-31 RX ORDER — SODIUM CHLORIDE 0.9 % (FLUSH) 0.9 %
5-40 SYRINGE (ML) INJECTION PRN
Status: DISCONTINUED | OUTPATIENT
Start: 2021-05-31 | End: 2021-06-04 | Stop reason: HOSPADM

## 2021-05-31 RX ORDER — SODIUM CHLORIDE 9 MG/ML
25 INJECTION, SOLUTION INTRAVENOUS PRN
Status: DISCONTINUED | OUTPATIENT
Start: 2021-05-31 | End: 2021-06-04 | Stop reason: HOSPADM

## 2021-05-31 RX ORDER — OXYCODONE HYDROCHLORIDE 5 MG/1
5 TABLET ORAL EVERY 4 HOURS PRN
Status: DISCONTINUED | OUTPATIENT
Start: 2021-05-31 | End: 2021-06-04 | Stop reason: HOSPADM

## 2021-05-31 RX ORDER — OXYCODONE HYDROCHLORIDE 10 MG/1
10 TABLET ORAL ONCE
Status: COMPLETED | OUTPATIENT
Start: 2021-05-31 | End: 2021-05-31

## 2021-05-31 RX ORDER — NICOTINE 21 MG/24HR
1 PATCH, TRANSDERMAL 24 HOURS TRANSDERMAL DAILY
Status: DISCONTINUED | OUTPATIENT
Start: 2021-05-31 | End: 2021-06-04 | Stop reason: HOSPADM

## 2021-05-31 RX ORDER — MORPHINE SULFATE 4 MG/ML
4 INJECTION, SOLUTION INTRAMUSCULAR; INTRAVENOUS ONCE
Status: COMPLETED | OUTPATIENT
Start: 2021-05-31 | End: 2021-05-31

## 2021-05-31 RX ORDER — SODIUM CHLORIDE 0.9 % (FLUSH) 0.9 %
5-40 SYRINGE (ML) INJECTION EVERY 12 HOURS SCHEDULED
Status: DISCONTINUED | OUTPATIENT
Start: 2021-05-31 | End: 2021-06-04 | Stop reason: HOSPADM

## 2021-05-31 RX ORDER — PROMETHAZINE HYDROCHLORIDE 25 MG/ML
6.25 INJECTION, SOLUTION INTRAMUSCULAR; INTRAVENOUS EVERY 4 HOURS PRN
Status: DISCONTINUED | OUTPATIENT
Start: 2021-05-31 | End: 2021-06-04 | Stop reason: HOSPADM

## 2021-05-31 RX ORDER — OXYCODONE HYDROCHLORIDE 5 MG/1
10 TABLET ORAL EVERY 4 HOURS PRN
Status: DISCONTINUED | OUTPATIENT
Start: 2021-05-31 | End: 2021-06-04 | Stop reason: HOSPADM

## 2021-05-31 RX ORDER — PROMETHAZINE HYDROCHLORIDE 12.5 MG/1
12.5 TABLET ORAL EVERY 6 HOURS PRN
Status: DISCONTINUED | OUTPATIENT
Start: 2021-05-31 | End: 2021-06-04 | Stop reason: HOSPADM

## 2021-05-31 RX ADMIN — OXYCODONE 10 MG: 5 TABLET ORAL at 19:28

## 2021-05-31 RX ADMIN — PROMETHAZINE HYDROCHLORIDE 12.5 MG: 12.5 TABLET ORAL at 23:53

## 2021-05-31 RX ADMIN — OXYCODONE 10 MG: 5 TABLET ORAL at 15:24

## 2021-05-31 RX ADMIN — MORPHINE SULFATE 2 MG: 2 INJECTION, SOLUTION INTRAMUSCULAR; INTRAVENOUS at 12:42

## 2021-05-31 RX ADMIN — Medication 10 ML: at 22:11

## 2021-05-31 RX ADMIN — MORPHINE SULFATE 2 MG: 2 INJECTION, SOLUTION INTRAMUSCULAR; INTRAVENOUS at 17:01

## 2021-05-31 RX ADMIN — OXYCODONE 10 MG: 5 TABLET ORAL at 23:53

## 2021-05-31 RX ADMIN — Medication 25 MG: at 00:00

## 2021-05-31 RX ADMIN — OXYCODONE HYDROCHLORIDE 10 MG: 10 TABLET ORAL at 03:07

## 2021-05-31 RX ADMIN — ONDANSETRON 4 MG: 2 INJECTION INTRAMUSCULAR; INTRAVENOUS at 17:01

## 2021-05-31 RX ADMIN — ENOXAPARIN SODIUM 40 MG: 40 INJECTION SUBCUTANEOUS at 09:52

## 2021-05-31 RX ADMIN — MORPHINE SULFATE 4 MG: 4 INJECTION INTRAVENOUS at 00:00

## 2021-05-31 RX ADMIN — PROMETHAZINE HYDROCHLORIDE 6.25 MG: 25 INJECTION INTRAMUSCULAR; INTRAVENOUS at 14:17

## 2021-05-31 RX ADMIN — PROMETHAZINE HYDROCHLORIDE 25 MG: 25 TABLET ORAL at 03:07

## 2021-05-31 RX ADMIN — SODIUM CHLORIDE: 9 INJECTION, SOLUTION INTRAVENOUS at 15:26

## 2021-05-31 RX ADMIN — MORPHINE SULFATE 4 MG: 4 INJECTION INTRAVENOUS at 02:08

## 2021-05-31 RX ADMIN — Medication 10 ML: at 09:44

## 2021-05-31 RX ADMIN — ONDANSETRON 4 MG: 2 INJECTION INTRAMUSCULAR; INTRAVENOUS at 09:44

## 2021-05-31 RX ADMIN — MORPHINE SULFATE 2 MG: 2 INJECTION, SOLUTION INTRAMUSCULAR; INTRAVENOUS at 20:51

## 2021-05-31 RX ADMIN — MORPHINE SULFATE 2 MG: 2 INJECTION, SOLUTION INTRAMUSCULAR; INTRAVENOUS at 09:44

## 2021-05-31 ASSESSMENT — PAIN DESCRIPTION - PAIN TYPE
TYPE: ACUTE PAIN

## 2021-05-31 ASSESSMENT — PAIN SCALES - GENERAL
PAINLEVEL_OUTOF10: 8
PAINLEVEL_OUTOF10: 9
PAINLEVEL_OUTOF10: 8
PAINLEVEL_OUTOF10: 6
PAINLEVEL_OUTOF10: 6
PAINLEVEL_OUTOF10: 8
PAINLEVEL_OUTOF10: 10
PAINLEVEL_OUTOF10: 5
PAINLEVEL_OUTOF10: 5
PAINLEVEL_OUTOF10: 7
PAINLEVEL_OUTOF10: 8
PAINLEVEL_OUTOF10: 5

## 2021-05-31 ASSESSMENT — PAIN DESCRIPTION - LOCATION
LOCATION: HEAD

## 2021-05-31 ASSESSMENT — PAIN DESCRIPTION - PROGRESSION
CLINICAL_PROGRESSION: GRADUALLY WORSENING
CLINICAL_PROGRESSION: NOT CHANGED
CLINICAL_PROGRESSION: GRADUALLY WORSENING
CLINICAL_PROGRESSION: GRADUALLY IMPROVING
CLINICAL_PROGRESSION: NOT CHANGED
CLINICAL_PROGRESSION: GRADUALLY IMPROVING
CLINICAL_PROGRESSION: NOT CHANGED

## 2021-05-31 ASSESSMENT — PAIN DESCRIPTION - DESCRIPTORS
DESCRIPTORS: SHARP;CONSTANT;PRESSURE
DESCRIPTORS: ACHING
DESCRIPTORS: SHARP;CONSTANT;PRESSURE
DESCRIPTORS: SHARP;CONSTANT;PRESSURE
DESCRIPTORS: ACHING
DESCRIPTORS: SHARP;CONSTANT;PRESSURE
DESCRIPTORS: SHARP;CONSTANT;PRESSURE
DESCRIPTORS: ACHING

## 2021-05-31 ASSESSMENT — PAIN DESCRIPTION - ORIENTATION
ORIENTATION: LEFT
ORIENTATION: MID
ORIENTATION: LEFT
ORIENTATION: MID
ORIENTATION: LEFT
ORIENTATION: LEFT
ORIENTATION: MID
ORIENTATION: LEFT
ORIENTATION: LEFT

## 2021-05-31 ASSESSMENT — PAIN DESCRIPTION - ONSET
ONSET: ON-GOING
ONSET: PROGRESSIVE
ONSET: ON-GOING
ONSET: PROGRESSIVE

## 2021-05-31 ASSESSMENT — PAIN DESCRIPTION - FREQUENCY
FREQUENCY: CONTINUOUS

## 2021-05-31 ASSESSMENT — PAIN - FUNCTIONAL ASSESSMENT
PAIN_FUNCTIONAL_ASSESSMENT: ACTIVITIES ARE NOT PREVENTED

## 2021-05-31 ASSESSMENT — PAIN DESCRIPTION - DIRECTION
RADIATING_TOWARDS: DOWN NECK

## 2021-05-31 NOTE — PROGRESS NOTES
4 Eyes Admission Assessment     I agree as the admission nurse that 2 RN's have performed a thorough Head to Toe Skin Assessment on the patient. ALL assessment sites listed below have been assessed on admission. Areas assessed by both nurses:   [x]   Head, Face, and Ears   [x]   Shoulders, Back, and Chest  [x]   Arms, Elbows, and Hands   [x]   Coccyx, Sacrum, and Ischium  [x]   Legs, Feet, and Heels        Does the Patient have Skin Breakdown? No       Patient has rash down Left side of neck, under Left arm, and on upper left abdomen.   Noel Prevention initiated:  No   Wound Care Orders initiated:  No      [facility] nurse consulted for Pressure Injury (Stage 3,4, Unstageable, DTI, NWPT, and Complex wounds) or Noel score 18 or lower:  No      Nurse 1 eSignature: Electronically signed by Genaro Obrien RN on 5/31/21 at 6:40 AM EDT    **SHARE this note so that the co-signing nurse is able to place an eSignature**    Nurse 2 eSignature: Electronically signed by Effie Rabago RN on 5/31/21 at 6:57 AM EDT

## 2021-05-31 NOTE — ED NOTES
[facility] arrived to take patient, and they noticed they had a flight tire. They called their dispatch and dispatch told them they can have someone here by [phone].       Arthur SAHU  05/31/21 [id number]

## 2021-05-31 NOTE — PROGRESS NOTES
Pt is A/O x4. VSS. Pt c/o a headache and pressure in the head, still struggling to find relief with pain meds. Pt denies numbness or tingling. Tolerating diet well. No nausea/vomiting this so far this shift. Fall precautions in place. Will continue to monitor.

## 2021-05-31 NOTE — ED NOTES
Access center called back stating that the earliest would be 0500, I informed them that we had someone coming around that time already so we wont be needing that transport and I appreciated all their help.  [facility] is still coming at [address] [facility]  05/31/21 9917

## 2021-05-31 NOTE — ED PROVIDER NOTES
[address] [facility]        Pt Name: Martin Mitchell  MRN: [id number]  [place] 1980  Date of evaluation: 5/30/2021  Provider: FREDRICK Corrigan - CNP  PCP: Andrew Gaspar MD  Note Started: 11:26 PM EDT        I have seen and evaluated this patient with my supervising physician Carl Alvarez MD.    [address] [place] [address]       Chief Complaint   Patient presents with    Headache     pt states that has a  shunt and had surgery in Oct d/t the shunt migrating and causing a brain bleed. pt. states that she has had \"some complications\" since then, but has been having \"shunt pain\" for a week and half ago since driving through [employer] in [place]. pt states \"Its not a headache but it is shunt pain\". pt. states that today she almost passed out. HISTORY OF PRESENT ILLNESS   (Location, Timing/Onset, Context/Setting, Quality, Duration, Modifying Factors, Severity, Associated Signs and Symptoms)  Note limiting factors. Martin Mitchell is a 47 y.o. female with history of hydrocephalus and  shunt who presents to the emergency department today complaining of headache with left neck swelling redness and warmth. Onset was today. Symptoms have been worsening throughout the day. She advised that in October of last year she had an infection of her  shunt in [place], and that this is exactly how it started with a red line going down her neck. No fevers. + nausea with vomiting. Pain rated 8/10. Nursing Notes were all reviewed and agreed with or any disagreements were addressed in the HPI. REVIEW OF SYSTEMS    (2-9 systems for level 4, 10 or more for level 5)     Review of Systems   Constitutional: Negative for chills, diaphoresis and fever. HENT: Negative. Eyes: Negative for visual disturbance. Respiratory: Negative for shortness of breath. Cardiovascular: Negative for chest pain. Gastrointestinal: Positive for nausea.  Negative for abdominal pain and vomiting. Musculoskeletal: Positive for neck pain. Negative for arthralgias, back pain and myalgias. Skin: Positive for color change. Negative for rash and wound. Allergic/Immunologic: Negative for immunocompromised state. Neurological: Positive for headaches. Negative for dizziness, syncope, weakness and numbness. Hematological: Negative for adenopathy. Psychiatric/Behavioral: Negative for confusion. All other systems reviewed and are negative. Positives and Pertinent negatives as per HPI. Except as noted above in the ROS, all other systems were reviewed and negative. PAST MEDICAL HISTORY     Past Medical History:   Diagnosis Date    ADHD (attention deficit hyperactivity disorder) 82    Anesthesia complication     pt states with general anesthesia, she gets very nauseated and wakes up with a migrain     Depression with anxiety     Difficult intubation     ESBL (extended spectrum beta-lactamase) producing bacteria infection 2019    urine    Functional ovarian cysts 2008    rt ovary cyst x 2 yrs.     Headache(784.0)     migraines    Hiatal hernia     History of blood transfusion     History of kidney stones     History of PCOS     Hydrocephalus (HCC)     Irritable bowel syndrome     had colonoscopy about 6 yrs ago    Kidney stone     Meningitis     Neutrophilic leukocytosis     Nicotine dependence     Primary osteoarthritis of left knee 2016    S/P cone biopsy of cervix 2004    Scoliosis .s     (ventriculoperitoneal) shunt status     hydrocephalus f/w Neurosurgeon at [facility] [address] Wears glasses     reading         SURGICAL HISTORY     Past Surgical History:   Procedure Laterality Date    APPENDECTOMY  2003    appendicitis     SECTION  2005    placenta previa    CHOLECYSTECTOMY      COLONOSCOPY  2004    COLPOSCOPY      CSF SHUNT      replaced at age 17    CYSTOSCOPY      stone removal    HERNIA REPAIR 1988    inguinal    HYSTERECTOMY, TOTAL ABDOMINAL  11/09/2016    TAHBSO, adhesions/PCOS    KNEE ARTHROSCOPY Left 1/7/2015    medial meniscectomy, chondroplasty, plica resection    LITHOTRIPSY Bilateral 12/10/2019    OTHER SURGICAL HISTORY  10/19/2016    op lap    VENTRICULOPERITONEAL SHUNT      multiple revisions, most recent 2015         CURRENTMEDICATIONS       Previous Medications    BENZONATATE (TESSALON) 100 MG CAPSULE    Take 100 mg by mouth 3 times daily as needed for Cough    FAMOTIDINE (PEPCID) 10 MG TABLET    Take 2 tablets by mouth 2 times daily    ONDANSETRON (ZOFRAN) 4 MG TABLET    Take 1 tablet by mouth every 8 hours as needed for Nausea         ALLERGIES     Bentyl [dicyclomine hcl], Mushroom extract complex, Sulfa antibiotics, Vancomycin, Acetaminophen, Adhesive tape, Bee venom, Levaquin [levofloxacin], Toradol [ketorolac tromethamine], Ceftaroline, Daptomycin, Dicyclomine, Fioricet-codeine [butalbital-apap-caff-cod], Oxycodone-acetaminophen, and Zosyn [piperacillin sod-tazobactam so]    FAMILYHISTORY       Family History   Problem Relation Age of Onset    High Blood Pressure Mother     Diabetes Mother     High Cholesterol Mother     Depression Mother     Diabetes Maternal Grandmother     High Blood Pressure Maternal Grandmother     High Cholesterol Maternal Grandmother     Heart Disease Maternal Grandfather     High Blood Pressure Maternal Grandfather     Heart Disease Paternal Grandmother     Stroke Paternal Grandfather     Depression Sister     Cirrhosis Father     Rheum Arthritis Neg Hx     Osteoarthritis Neg Hx     Asthma Neg Hx     Breast Cancer Neg Hx     Cancer Neg Hx     Heart Failure Neg Hx     Hypertension Neg Hx     Migraines Neg Hx     Ovarian Cancer Neg Hx     Rashes/Skin Problems Neg Hx     Seizures Neg Hx     Thyroid Disease Neg Hx           SOCIAL HISTORY       Social History     Tobacco Use    Smoking status: Current Some Day Smoker     Packs/day: 0.50 Years: 18.00     Pack years: 9.00     Types: Cigarettes    Smokeless tobacco: Never Used   Vaping Use    Vaping Use: Never used   Substance Use Topics    Alcohol use: No     Alcohol/week: 0.0 standard drinks    Drug use: No       SCREENINGS    [place] Coma Scale  Eye Opening: Spontaneous  Best Verbal Response: Oriented  Best Motor Response: Obeys commands  [place] Coma Scale Score: 15        PHYSICAL EXAM    (up to 7 for level 4, 8 or more for level 5)     ED Triage Vitals [05/30/21 2030]   BP Temp Temp Source Pulse Resp SpO2 Height Weight   (!) 152/84 98.1 °F (36.7 °C) Temporal 112 16 97 % -- 157 lb 10.1 oz (71.5 kg)       Physical Exam  Vitals and nursing note reviewed. Constitutional:       General: She is not in acute distress. Appearance: Normal appearance. She is well-developed. She is not toxic-appearing. HENT:      Head: Normocephalic and atraumatic. Right Ear: Tympanic membrane and ear canal normal.      Left Ear: Tympanic membrane and ear canal normal.   Eyes:      General: No scleral icterus. Extraocular Movements: Extraocular movements intact. Conjunctiva/sclera: Conjunctivae normal.      Pupils: Pupils are equal, round, and reactive to light. Neck:      Vascular: No JVD. Cardiovascular:      Rate and Rhythm: Normal rate and regular rhythm. Heart sounds: Normal heart sounds. Pulmonary:      Effort: Pulmonary effort is normal. No respiratory distress. Breath sounds: Normal breath sounds. Abdominal:      General: There is no distension. Palpations: Abdomen is soft. Abdomen is not rigid. Tenderness: There is no abdominal tenderness. Musculoskeletal:         General: Normal range of motion. Cervical back: Normal range of motion and neck supple. Edema present. No crepitus. Skin:     General: Skin is warm and dry. Capillary Refill: Capillary refill takes less than 2 seconds. Findings: Erythema (left neck) present. No rash. by the radiologist. Leigh Lopez radiographic images are visualized and preliminarily interpreted by the ED Provider with the below findings:        Interpretation per the Radiologist below, if available at the time of this note:    CT HEAD WO CONTRAST   Final Result   No acute intracranial abnormality. Ventricles appear somewhat slit-like, similar to prior. Low lying cerebellar tonsils. This is also unchanged         XR SHUNT SERIES PLACEMENT (<4 VIEWS)   Final Result   No evidence of shunt catheter discontinuity           CT HEAD WO CONTRAST    Result Date: 5/30/2021  EXAMINATION: CT OF THE HEAD WITHOUT CONTRAST  5/30/2021 9:21 pm TECHNIQUE: CT of the head was performed without the administration of intravenous contrast. Dose modulation, iterative reconstruction, and/or weight based adjustment of the mA/kV was utilized to reduce the radiation dose to as low as reasonably achievable. COMPARISON: July 2020 HISTORY: ORDERING SYSTEM PROVIDED HISTORY: headache TECHNOLOGIST PROVIDED HISTORY: Reason for exam:->headache Has a \"code stroke\" or \"stroke alert\" been called? ->No Decision Support Exception - unselect if not a suspected or confirmed emergency medical condition->Emergency Medical Condition (MA) Is the patient pregnant?->No Reason for Exam: Post-op Problem (pt states that has a  shunt and had surgery in Oct d/t the shunt migrating and causing a brain bleed. pt. states that she has had \"some complications\" since then, but has been having \"shunt pain\" for a week and half ago since driving through [employer] in [place]. pt states \"Its not a headache but it is shunt pain\". pt. states that today she almost passed out. ) Acuity: Acute Type of Exam: Initial FINDINGS: BRAIN/VENTRICLES: Ventricles are stable in size, shape, and position. Tip of  shunt catheter projects in region of 3rd ventricle. Ventricles are somewhat slit-like. No hydrocephalus noted.   Subtle hypodense focus marginates the frontal horn left lateral ventricle, similar to prior. No obvious hemorrhage Cerebellar tonsils appears low lying extending below the level of foramen magnum. , similar to prior ORBITS: The visualized portion of the orbits demonstrate no acute abnormality. SINUSES: No significant mastoid opacification noted. No air-fluid level seen in the sinuses. SOFT TISSUES/SKULL:  There is evidence prior aileen hole placement on the right     No acute intracranial abnormality. Ventricles appear somewhat slit-like, similar to prior. Low lying cerebellar tonsils. This is also unchanged     XR SHUNT SERIES PLACEMENT (<4 VIEWS)    Result Date: 5/30/2021  EXAMINATION: SHUNT SERIES 5/30/2021 9:11 pm COMPARISON: July 2019 HISTORY: ORDERING SYSTEM PROVIDED HISTORY: headache TECHNOLOGIST PROVIDED HISTORY: Reason for exam:->headache Reason for Exam: Post-op Problem (pt states that has a  shunt and had surgery in Oct d/t the shunt migrating and causing a brain bleed. pt. states that she has had \"some complications\" since then, but has been having \"shunt pain\" for a week and half ago since driving through [employer] in [place]. pt states \"Its not a headache but it is shunt pain\". pt. states that today she almost passed out. ) Acuity: Acute Type of Exam: Initial FINDINGS: Left-sided  shunt catheter is seen. No obvious shunt catheter discontinuity. .  Nasal piercing is incidentally noted Heart size is normal.  There is mild curvature of the spine. No focal consolidation seen in the lungs Tip of  shunt catheter projects in the left lower pelvis. Bowel gas pattern is nonobstructive. Moderate stool seen in the colon.      No evidence of shunt catheter discontinuity           PROCEDURES   Unless otherwise noted below, none     Procedures    CRITICAL CARE TIME   N/A    CONSULTS:  IP CONSULT TO NEUROSURGERY      EMERGENCY DEPARTMENT COURSE and DIFFERENTIAL DIAGNOSIS/MDM:   Vitals:    Vitals:    05/30/21 2030 05/30/21 2221 05/31/21 0152 05/31/21 0213   BP: (!) 152/84 (!) 141/99 120/85    Pulse: 112   89   Resp: 16      Temp: 98.1 °F (36.7 °C)      TempSrc: Temporal      SpO2: 97% 97% 99%    Weight: 157 lb 10.1 oz (71.5 kg)          Patient was given the following medications:  Medications   ondansetron (ZOFRAN-ODT) disintegrating tablet 8 mg (8 mg Oral Given 5/30/21 2223)   morphine (PF) injection 4 mg (4 mg Intravenous Given 5/31/21 0000)   promethazine (PHENERGAN) in sodium chloride 0.9% 50 mL IVPB 25 mg (0 mg Intravenous Stopped 5/31/21 0039)   morphine (PF) injection 4 mg (4 mg Intravenous Given 5/31/21 0208)           Differential Diagnosis:  shunt malfunction,  shunt infection, subarachnoid hemorrhage, Meningitis, Temporal arteritis, Pseudotumor Cerebri, Migraine, other    Patient presents with headache and swelling redness and warmth of the left neck. See HPI for full presentation. Physical exam as above. 47year-old lying in bed uncomfortable. Red streak going around the left ear down neck. She has a tender left neck. No neurovascular deficits. Shunt series shows good placement with no discontinuity. CT head shows no acute intracranial abnormality. CBC and chemistry grossly unremarkable. ESR and lactic acid normal.    Patient concerned about  shunt infection because this is how started in October. Consulted with neurosurgery, Dr. Siomara Cruz, who recommended transfer. Patient was agreeable to this. I spoke with Dr. Ritesh Hernandez at [facility]. who accepted patient for transfer. She advised to hold on antibiotics for the time being. Patient was transferred via private ambulance in stable condition. The patient tolerated their visit well. They were seen and evaluated by the attending physician, Zoe Loya MD who agreed with the assessment and plan. The patient and / or the family were informed of the results of any tests, a time was given to answer questions, a plan was proposed and they agreed with plan.       FINAL IMPRESSION      1. Acute nonintractable headache, unspecified headache type    2.  (ventriculoperitoneal) shunt status          DISPOSITION/PLAN   DISPOSITION Decision To Transfer 05/30/2021 11:17:53 PM      PATIENT REFERRED TO:  No follow-up provider specified.     DISCHARGE MEDICATIONS:  New Prescriptions    No medications on file       DISCONTINUED MEDICATIONS:  Discontinued Medications    No medications on file              (Please note that portions of this note were completed with a voice recognition program.  Efforts were made to edit the dictations but occasionally words are mis-transcribed.)    FREDRICK Valle CNP (electronically signed)           FREDRICK Valle CNP  05/31/21 5340

## 2021-05-31 NOTE — PROGRESS NOTES
Patient arrived to room 5507 from [facility] ANGELA JONES, and c/o pressure to her head. Patient oriented to room with all questions answered. Fall precautions are in place, will continue to monitor and assess patient.

## 2021-05-31 NOTE — H&P
Hospital Medicine History & Physical      PCP: Марина Hatch MD    Date of Admission: 2021    Date of Service: 2021    Pt seen/examined on 2021    Admitted to Inpatient with expected LOS greater than two midnights due to medical therapy. Chief Complaint:     No chief complaint on file. Headache    History Of Present Illness:      47 y.o. female who presented to [facility] with 1.5 weeks of increasing left sided head pressure radiating into the neck. She has h/o congenital hydrocephalus w/  shunt in place that has been complicated by malfunction multiple times. This feels similar to previous episodes. Past Medical History:          Diagnosis Date    ADHD (attention deficit hyperactivity disorder) 82    Anesthesia complication     pt states with general anesthesia, she gets very nauseated and wakes up with a migrain     Depression with anxiety     Difficult intubation     ESBL (extended spectrum beta-lactamase) producing bacteria infection 2019    urine    Functional ovarian cysts 2008    rt ovary cyst x 2 yrs.     Headache(784.0)     migraines    Hiatal hernia     History of blood transfusion     History of kidney stones     History of PCOS     Hydrocephalus (HCC)     Irritable bowel syndrome 2004    had colonoscopy about 6 yrs ago    Kidney stone     Meningitis     Neutrophilic leukocytosis     Nicotine dependence     Primary osteoarthritis of left knee 2016    S/P cone biopsy of cervix 2004    Scoliosis .s     (ventriculoperitoneal) shunt status     hydrocephalus f/w Neurosurgeon at [place] [address] Wears glasses     reading       Past Surgical History:          Procedure Laterality Date    APPENDECTOMY  2003    appendicitis     SECTION      placenta previa    CHOLECYSTECTOMY      COLONOSCOPY  2004    COLPOSCOPY      CSF SHUNT      replaced at age 17    CYSTOSCOPY      stone removal   [facility] inguinal    HYSTERECTOMY, TOTAL ABDOMINAL  11/09/2016    TAHBSO, adhesions/PCOS    KNEE ARTHROSCOPY Left 1/7/2015    medial meniscectomy, chondroplasty, plica resection    LITHOTRIPSY Bilateral 12/10/2019    OTHER SURGICAL HISTORY  10/19/2016    op lap    VENTRICULOPERITONEAL SHUNT      multiple revisions, most recent 2015       Medications Prior to Admission:      Prior to Admission medications    Medication Sig Start Date End Date Taking? Authorizing Provider   benzonatate (TESSALON) 100 MG capsule Take 100 mg by mouth 3 times daily as needed for Cough    Historical Provider, MD   famotidine (PEPCID) 10 MG tablet Take 2 tablets by mouth 2 times daily 6/29/20 7/29/20  CRISPIN Baum   ondansetron [facility]) 4 MG tablet Take 1 tablet by mouth every 8 hours as needed for Nausea 2/21/20   Curt Morrow [place], APRN - CNP       Allergies:  Bentyl [dicyclomine hcl], Mushroom extract complex, Sulfa antibiotics, Vancomycin, Acetaminophen, Adhesive tape, Bee venom, Levaquin [levofloxacin], Toradol [ketorolac tromethamine], Ceftaroline, Daptomycin, Dicyclomine, Fioricet-codeine [butalbital-apap-caff-cod], Oxycodone-acetaminophen, and Zosyn [piperacillin sod-tazobactam so]    Social History:      TOBACCO:   reports that she has been smoking cigarettes. She has a 9.00 pack-year smoking history. She has never used smokeless tobacco.  ETOH:   reports no history of alcohol use.     Family History:      Reviewed in detail and negative except as follows:        Problem Relation Age of Onset    High Blood Pressure Mother     Diabetes Mother     High Cholesterol Mother     Depression Mother     Diabetes Maternal Grandmother     High Blood Pressure Maternal Grandmother     High Cholesterol Maternal Grandmother     Heart Disease Maternal Grandfather     High Blood Pressure Maternal Grandfather     Heart Disease Paternal [place]     Stroke Paternal Grandfather     Depression Sister     Cirrhosis Father     Rheum Arthritis Neg Hx     Osteoarthritis Neg Hx     Asthma Neg Hx     Breast Cancer Neg Hx     Cancer Neg Hx     Heart Failure Neg Hx     Hypertension Neg Hx     Migraines Neg Hx     Ovarian Cancer Neg Hx     Rashes/Skin Problems Neg Hx     Seizures Neg Hx     Thyroid Disease Neg Hx        REVIEW OF SYSTEMS:   Pertinent positives and negatives as noted in the HPI. All other systems reviewed and negative. PHYSICAL EXAM PERFORMED:    /86   Pulse 88   Temp 98.3 °F (36.8 °C) (Oral)   Resp 16   LMP 10/31/2016 (Exact Date)   SpO2 96%     General appearance:  No acute distress, appears stated age  Eyes: Pupils equal, round, reactive to light, conjunctiva/corneas clear  Ears/Nose/Mouth/Throat: No external lesions or scars, hearing intact to voice  Neck: Trachea midline, no masses noted, no thyromegaly  Respiratory:  Non-labored breathing, clear to auscultation bilaterally  Cardiovascular: Regular rate and rhythm, no murmurs, gallops, or rubs  Abdomen: soft, non-tender, non-distended  Musculoskeletal: Warm, well perfused, no cyanosis or edema  Skin: rash over the left abdomen, left neck, behind left ear, (tracking along shunt location per patient)  Psychiatric: A&Ox4, good insight and judgment    Labs:     Recent Labs     05/31/21  0008   WBC 13.7*   HGB 14.9   HCT 42.7        Recent Labs     05/30/21  2348      K 5.3*      CO2 21   BUN 11   CREATININE 0.7   CALCIUM 9.3     Recent Labs     05/30/21  2348   AST 35   ALT 17   BILITOT <0.2   ALKPHOS 99     No results for input(s): INR in the last 72 hours. No results for input(s): Cherene Ahumada in the last 72 hours.     Urinalysis:      Lab Results   Component Value Date    NITRU Negative 07/09/2020    WBCUA 3 06/29/2020    BACTERIA 1+ 04/15/2020    RBCUA 1 06/29/2020    BLOODU Negative 07/09/2020    SPECGRAV >1.030 07/09/2020    GLUCOSEU Negative 07/09/2020    GLUCOSEU NEGATIVE 10/08/2011       Radiology:     No orders to display ASSESSMENT:    Active Hospital Problems    Diagnosis Date Noted    Headache [R51.9] 05/31/2021       PLAN:    # Congenital hydrocephalus  #  shunt malfunction, possible  -neurosurgery consulted  -will hold on antibiotics  -neuro checks  -analgesia/antiemetics as needed    # PCOS  # Depression/anxiety    # Nicotine dependence  -advised to abstain  -nicotine patch    DVT Prophylaxis: Lovenox  Diet: DIET GENERAL;  Code Status: Full Code    PT/OT Eval Status: n/a, baseline    Dispo: Inpt, dispo pending clinical improvement    Randolph Molina MD    Thank you Grace Gambino MD for the opportunity to be involved in this patient's care. If you have any questions or concerns please feel free to contact me at [phone].

## 2021-05-31 NOTE — PLAN OF CARE
Problem: Falls - Risk of:  Goal: Will remain free from falls  Description: Will remain free from falls  Outcome: Ongoing  Note: Pt is in bed with alarm on. Non-skid socks are on. Up as SBA, tolerates ambulation well. Fall precautions in place. Call light and bedside table in reach. Problem: Pain:  Goal: Control of acute pain  Description: Control of acute pain  Outcome: Ongoing  Note: Pt c/o 9/10 pain to head. PRN IV meds given. Non-pharmaceutical pain management offered. Will continue to monitor.

## 2021-06-01 ENCOUNTER — APPOINTMENT (OUTPATIENT)
Dept: MRI IMAGING | Age: 41
DRG: 058 | End: 2021-06-01
Attending: INTERNAL MEDICINE
Payer: COMMERCIAL

## 2021-06-01 LAB
ANION GAP SERPL CALCULATED.3IONS-SCNC: 9 MMOL/L (ref 3–16)
BASOPHILS ABSOLUTE: 0 K/UL (ref 0–0.2)
BASOPHILS RELATIVE PERCENT: 0 %
BUN BLDV-MCNC: 14 MG/DL (ref 7–20)
CALCIUM SERPL-MCNC: 8.6 MG/DL (ref 8.3–10.6)
CHLORIDE BLD-SCNC: 107 MMOL/L (ref 99–110)
CO2: 25 MMOL/L (ref 21–32)
CREAT SERPL-MCNC: 0.9 MG/DL (ref 0.6–1.1)
EOSINOPHILS ABSOLUTE: 0.2 K/UL (ref 0–0.6)
EOSINOPHILS RELATIVE PERCENT: 2 %
GFR AFRICAN AMERICAN: >60
GFR NON-AFRICAN AMERICAN: >60
GLUCOSE BLD-MCNC: 131 MG/DL (ref 70–99)
HCT VFR BLD CALC: 41.3 % (ref 36–48)
HEMOGLOBIN: 14.1 G/DL (ref 12–16)
LYMPHOCYTES ABSOLUTE: 4.3 K/UL (ref 1–5.1)
LYMPHOCYTES RELATIVE PERCENT: 44 %
MCH RBC QN AUTO: 29.1 PG (ref 26–34)
MCHC RBC AUTO-ENTMCNC: 34 G/DL (ref 31–36)
MCV RBC AUTO: 85.5 FL (ref 80–100)
MONOCYTES ABSOLUTE: 0.4 K/UL (ref 0–1.3)
MONOCYTES RELATIVE PERCENT: 4 %
NEUTROPHILS ABSOLUTE: 4.9 K/UL (ref 1.7–7.7)
NEUTROPHILS RELATIVE PERCENT: 50 %
PDW BLD-RTO: 14.8 % (ref 12.4–15.4)
PLATELET # BLD: 265 K/UL (ref 135–450)
PLATELET SLIDE REVIEW: ADEQUATE
PMV BLD AUTO: 7.8 FL (ref 5–10.5)
POTASSIUM REFLEX MAGNESIUM: 4.4 MMOL/L (ref 3.5–5.1)
RBC # BLD: 4.83 M/UL (ref 4–5.2)
RBC # BLD: NORMAL 10*6/UL
SLIDE REVIEW: NORMAL
SODIUM BLD-SCNC: 141 MMOL/L (ref 136–145)
WBC # BLD: 9.7 K/UL (ref 4–11)

## 2021-06-01 PROCEDURE — 36415 COLL VENOUS BLD VENIPUNCTURE: CPT

## 2021-06-01 PROCEDURE — 6360000002 HC RX W HCPCS: Performed by: INTERNAL MEDICINE

## 2021-06-01 PROCEDURE — 2580000003 HC RX 258: Performed by: INTERNAL MEDICINE

## 2021-06-01 PROCEDURE — 85025 COMPLETE CBC W/AUTO DIFF WBC: CPT

## 2021-06-01 PROCEDURE — 70553 MRI BRAIN STEM W/O & W/DYE: CPT

## 2021-06-01 PROCEDURE — 80048 BASIC METABOLIC PNL TOTAL CA: CPT

## 2021-06-01 PROCEDURE — 6370000000 HC RX 637 (ALT 250 FOR IP): Performed by: INTERNAL MEDICINE

## 2021-06-01 PROCEDURE — A9576 INJ PROHANCE MULTIPACK: HCPCS | Performed by: NEUROLOGICAL SURGERY

## 2021-06-01 PROCEDURE — 1200000000 HC SEMI PRIVATE

## 2021-06-01 PROCEDURE — 6360000004 HC RX CONTRAST MEDICATION: Performed by: NEUROLOGICAL SURGERY

## 2021-06-01 RX ORDER — DIPHENHYDRAMINE HYDROCHLORIDE 50 MG/ML
12.5 INJECTION INTRAMUSCULAR; INTRAVENOUS EVERY 6 HOURS PRN
Status: DISCONTINUED | OUTPATIENT
Start: 2021-06-01 | End: 2021-06-04

## 2021-06-01 RX ADMIN — OXYCODONE 10 MG: 5 TABLET ORAL at 16:05

## 2021-06-01 RX ADMIN — OXYCODONE 10 MG: 5 TABLET ORAL at 04:15

## 2021-06-01 RX ADMIN — SODIUM CHLORIDE: 9 INJECTION, SOLUTION INTRAVENOUS at 18:48

## 2021-06-01 RX ADMIN — PROMETHAZINE HYDROCHLORIDE 6.25 MG: 25 INJECTION INTRAMUSCULAR; INTRAVENOUS at 08:27

## 2021-06-01 RX ADMIN — LORAZEPAM 0.5 MG: 2 INJECTION INTRAMUSCULAR; INTRAVENOUS at 20:34

## 2021-06-01 RX ADMIN — HYDROMORPHONE HYDROCHLORIDE 0.5 MG: 1 INJECTION, SOLUTION INTRAMUSCULAR; INTRAVENOUS; SUBCUTANEOUS at 17:06

## 2021-06-01 RX ADMIN — DIPHENHYDRAMINE HYDROCHLORIDE 12.5 MG: 50 INJECTION, SOLUTION INTRAMUSCULAR; INTRAVENOUS at 13:43

## 2021-06-01 RX ADMIN — OXYCODONE 10 MG: 5 TABLET ORAL at 23:42

## 2021-06-01 RX ADMIN — GADOTERIDOL 16 ML: 279.3 INJECTION, SOLUTION INTRAVENOUS at 21:24

## 2021-06-01 RX ADMIN — ENOXAPARIN SODIUM 40 MG: 40 INJECTION SUBCUTANEOUS at 08:26

## 2021-06-01 RX ADMIN — MORPHINE SULFATE 2 MG: 2 INJECTION, SOLUTION INTRAMUSCULAR; INTRAVENOUS at 01:22

## 2021-06-01 RX ADMIN — Medication 10 ML: at 08:26

## 2021-06-01 RX ADMIN — HYDROMORPHONE HYDROCHLORIDE 0.5 MG: 1 INJECTION, SOLUTION INTRAMUSCULAR; INTRAVENOUS; SUBCUTANEOUS at 13:43

## 2021-06-01 RX ADMIN — OXYCODONE 10 MG: 5 TABLET ORAL at 19:53

## 2021-06-01 RX ADMIN — MORPHINE SULFATE 2 MG: 2 INJECTION, SOLUTION INTRAMUSCULAR; INTRAVENOUS at 07:15

## 2021-06-01 RX ADMIN — DIPHENHYDRAMINE HYDROCHLORIDE 12.5 MG: 50 INJECTION, SOLUTION INTRAMUSCULAR; INTRAVENOUS at 23:42

## 2021-06-01 RX ADMIN — HYDROMORPHONE HYDROCHLORIDE 0.5 MG: 1 INJECTION, SOLUTION INTRAMUSCULAR; INTRAVENOUS; SUBCUTANEOUS at 21:41

## 2021-06-01 RX ADMIN — MORPHINE SULFATE 2 MG: 2 INJECTION, SOLUTION INTRAMUSCULAR; INTRAVENOUS at 11:07

## 2021-06-01 RX ADMIN — OXYCODONE 10 MG: 5 TABLET ORAL at 09:31

## 2021-06-01 ASSESSMENT — PAIN DESCRIPTION - PROGRESSION
CLINICAL_PROGRESSION: NOT CHANGED

## 2021-06-01 ASSESSMENT — PAIN DESCRIPTION - LOCATION
LOCATION: HEAD

## 2021-06-01 ASSESSMENT — PAIN DESCRIPTION - ORIENTATION
ORIENTATION: MID

## 2021-06-01 ASSESSMENT — PAIN DESCRIPTION - DESCRIPTORS
DESCRIPTORS: ACHING

## 2021-06-01 ASSESSMENT — PAIN - FUNCTIONAL ASSESSMENT
PAIN_FUNCTIONAL_ASSESSMENT: ACTIVITIES ARE NOT PREVENTED

## 2021-06-01 ASSESSMENT — PAIN DESCRIPTION - DIRECTION
RADIATING_TOWARDS: DOWN NECK

## 2021-06-01 ASSESSMENT — PAIN DESCRIPTION - FREQUENCY
FREQUENCY: CONTINUOUS

## 2021-06-01 ASSESSMENT — PAIN DESCRIPTION - PAIN TYPE
TYPE: ACUTE PAIN

## 2021-06-01 ASSESSMENT — PAIN DESCRIPTION - ONSET
ONSET: ON-GOING

## 2021-06-01 ASSESSMENT — PAIN SCALES - GENERAL
PAINLEVEL_OUTOF10: 9
PAINLEVEL_OUTOF10: 7
PAINLEVEL_OUTOF10: 0
PAINLEVEL_OUTOF10: 8
PAINLEVEL_OUTOF10: 8
PAINLEVEL_OUTOF10: 7
PAINLEVEL_OUTOF10: 9
PAINLEVEL_OUTOF10: 5
PAINLEVEL_OUTOF10: 8
PAINLEVEL_OUTOF10: 7
PAINLEVEL_OUTOF10: 6
PAINLEVEL_OUTOF10: 8
PAINLEVEL_OUTOF10: 8
PAINLEVEL_OUTOF10: 7
PAINLEVEL_OUTOF10: 7
PAINLEVEL_OUTOF10: 8

## 2021-06-01 NOTE — CONSULTS
[facility] [address], [address]                                  CONSULTATION    PATIENT NAME: Arnulfo Mckeon                      :        1980  MED REC NO:   [id number]                          ROOM:       [id number]  ACCOUNT NO:   [de-identified]                           ADMIT DATE: 2021  PROVIDER:     Eva De La Cruz MD    CONSULT DATE:  2021    ATTENDING PROVIDER:  Raoul Correa MD    HISTORY OF PRESENT ILLNESS:  This patient is a 53-year-old woman who was  seen in the emergency room and admitted with a possible referral for  shunt malfunction. Speaking to the emergency room physician yesterday,  this patient was describing neck pain. She has a ventriculoperitoneal  shunt in place and stated back in 10/2020, she was admitted for a shunt  infection and a new shunt was placed. History that she provides directly to me is that in the last week or so,  she has increasing lightheadedness, vertigo, difficulty standing, and  discomfort, but not describing outright severe headaches and she feels  relieved as soon as she lies down. The patient was in [place] in  10/2020 and was seen at [facility] by Dr. Aries Antonio  who removed her shunt and replaced it. The patient relays that she had  a prior adjustable valve, shunt that was not working properly. He  switched it out and placed a fixed valve the type of which and the  setting, I do not know. I do not know if new catheters were placed or  whether this is just a changeout of the valve. The patient states that  these records have been referred to my chart. The patient states in the  past she was having lot of problems with her shunt. The shunt was  placed for congenital hydrocephalus. The patient is now in [address] [place] [address]. PHYSICAL EXAMINATION:  GENERAL:  She is in a room with the lights low and the shades closed on  a bright pietro day.   She is somewhat sleeping, but woke up right away  when I came in to see her. NEUROLOGIC:  Her cranial nerves are intact. Her shunt is palpated and  though she feels some tenderness along the left side of her neck where  the shunt catheter is, I do not feel induration or evidence of infection  that is definitive in my opinion. Remainder of her neurologic  examination is unremarkable. DIAGNOSTICS:  I reviewed the CT scan of the brain. The patient has a  shunt presumably in the left frontal horn and she has complete  ventricular collapse or slit-like ventricles and has some cerebellar  descent, i.e., a Chiari malformation. ASSESSMENT:  This is a complex history and more information is required  including operative notes, prior shunt valves, and current shunt valve  and possible other prior surgical history. Clinically, this patient is  not describing a shunt infection. 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 PLAN:  We may have to consider revising her shunt or temporarily  clamping her shunt and see how she responds to this maneuver. Cultures  meanwhile have been sent, are pending. Chandler Suarez MD    D: 05/31/2021 16:18:10       T: 05/31/2021 19:03:42     [id number]  Job#: [id number]     Doc#: [id number]    CC:   Gilda Fisher MD

## 2021-06-01 NOTE — CARE COORDINATION
Case Management Assessment           Initial Evaluation                Date / Time of Evaluation: 6/1/2021 4:16 PM                 Assessment Completed by: Lopez Montes De Oca RN    Patient Name: Deny Brandon     YOB: 1980  Diagnosis: Headache [R51.9]     Date / Time: 5/31/2021  6:13 AM    Patient Admission Status: Inpatient    If patient is discharged prior to next notation, then this note serves as note for discharge by case management. Current PCP: Oscar Murphy MD  Clinic Patient: No    Chart Reviewed: Yes  Patient/ Family Interviewed: Yes    Initial assessment completed at bedside with: patient    Hospitalization in the last 30 days: No    Emergency Contacts:  Extended Emergency Contact Information  Primary Emergency Contact: Satya Gomez [facility]  Home Phone: [phone]  Relation: Parent  Secondary Emergency Contact: Shanae Mckeon  Home Phone: [phone]  Relation: Other    Advance Directives:   Code Status: Full Code      Financial  Payor: Isela Jolly / Plan: Jeremias Nava / Product Type: *No Product type* /     Pre-cert required for SNF: Yes    Pharmacy    [facility]  [address] [address] [address]  Phone: [phone] Fax: [phone]    [facility] [address], [address] [facility] [address] [phone] - F [phone]  [address] [place] [address]  Phone: [phone] Fax: [phone]    [facility], [address] [facility] [phone] [facility] [phone]  [address] [place] [address]  Phone: [phone] Fax: [phone]      Potential assistance Purchasing Medications:    Does Patient want to participate in local refill/ meds to beds program?: Not Assessed    Meds To Beds General Rules:  1. Can ONLY be done Monday- Friday between 8:30am-5pm  2.  Prescription(s) must be in pharmacy by 3pm to be filled same day  3. Copy of patient's insurance/ prescription drug card and patient face sheet must be sent along with the prescription(s)  4. Cost of Rx cannot be added to hospital bill. If financial assistance is needed, please contact unit  or ;  or  CANNOT provide pharmacy voucher for patients co-pays  5. Patients can then  the prescription on their way out of the hospital at discharge, or pharmacy can deliver to the bedside if staff is available. (payment due at time of pick-up or delivery - cash, check, or card accepted)     Able to afford home medications/ co-pay costs: Yes    ADLS  Support Systems: Family Members, Children    PT AM-PAC:   /24  OT AM-PAC:   /24    [place]: lives alone  Plans to RETURN to current housing: No  Barriers to RETURNING to current housing: alone/medical instability      Home Care Information  Currently ACTIVE with [address]: No  Home Care Agency: Not Applicable    Currently ACTIVE with [place] on Aging: No      DISCHARGE PLAN:  Disposition: Home- No Services Needed -at [employer] PLAN for discharge: family     Factors facilitating achievement of predicted outcomes: Family support, Friend support, Motivated and Cooperative    Barriers to discharge: Medical complications    Additional Case Management Notes: Patient here with neuro followong. Problem with V shunt. Patient doing intake hx with Neuro NP right now and CM following. Patient will discharge to mother's house and CM will follow if patient needs any home care at discharge. Mother's address:    Lio Encinas: [place]    [phone]  [address] [place] [address]     The Plan for Transition of Care is related to the following treatment goals of Headache [R51.9]    The Patient and/or patient representative Petrona and her family were provided with a choice of provider and agrees with the discharge plan Yes    Freedom of choice list was provided with basic dialogue that supports the patient's individualized plan of care/goals and shares the quality data associated with the providers.  Yes    Care Transition patient: No    Sandoval Damian RN  [facility]  Case Management Department  Ph: [phone]

## 2021-06-01 NOTE — PROGRESS NOTES
Hospitalist Progress Note      PCP: Andreas Augustin MD    Date of Admission: 5/31/2021    Chief Complaint:     No chief complaint on file. headache    Subjective:  Patient seen and examined at the bedside.   No acute events overnight  Continued headache  Neurosurgery has seen, considering shunt clamp vs. Revision  No ABx, cultures sent, nothing yet    PFHS: reviewed as documented 5/31/2021, no changes    Medications:  Reviewed    Infusion Medications    sodium chloride      sodium chloride 75 mL/hr at 05/31/21 1526     Scheduled Medications    sodium chloride flush  5-40 mL Intravenous 2 times per day    enoxaparin  40 mg Subcutaneous Daily    nicotine  1 patch Transdermal Daily     PRN Meds: sodium chloride flush, sodium chloride, promethazine **OR** ondansetron, polyethylene glycol, morphine, promethazine, LORazepam, oxyCODONE **OR** oxyCODONE      Intake/Output Summary (Last 24 hours) at 6/1/2021 1159  Last data filed at 6/1/2021 0932  Gross per 24 hour   Intake 720 ml   Output --   Net 720 ml       Physical Exam    BP 98/61   Pulse 78   Temp 97.7 °F (36.5 °C) (Axillary)   Resp 16   LMP 10/31/2016 (Exact Date)   SpO2 97%     General appearance:  No acute distress, appears stated age  Eyes: Pupils equal, round, reactive to light, conjunctiva/corneas clear  Ears/Nose/Mouth/Throat: No external lesions or scars, hearing intact to voice  Neck: Trachea midline, no masses noted, no thyromegaly  Respiratory:  Non-labored breathing, clear to auscultation bilaterally  Cardiovascular: Regular rate and rhythm, no murmurs, gallops, or rubs  Abdomen: soft, non-tender, non-distended  Musculoskeletal: Warm, well perfused, no cyanosis or edema  Skin: normal color, no wounds noted  Psychiatric: A&Ox4, good insight and judgment    Labs:   Recent Labs     05/31/21  0008 06/01/21  0602   WBC 13.7* 9.7   HGB 14.9 14.1   HCT 42.7 41.3    265     Recent Labs     05/30/21  2348 06/01/21  0602    141   K 5.3*

## 2021-06-01 NOTE — PROGRESS NOTES
Patient alert and oriented x4, VSS. Describes head pain, tolerating PO and IV pain medicine per mar. Neuro checks WNL. Tolerating diet and ambulation. Fall precautions in place.

## 2021-06-01 NOTE — PROGRESS NOTES
Pt is A&O, VSS. Tolerating diet and IVF. Voiding adequately. Up x1 SBA. C/o head pain, medicated per MAR. Neuro checks remain unchanged. All fall precautions in place.

## 2021-06-01 NOTE — PROGRESS NOTES
developed. Alert and cooperative in no acute distress. HEENT: atraumatic, neck supple  Eyes: Optic discs: Not tested  Pulmonary: unlabored respiratory effort  Cardiovascular:  Warm well perfused. No peripheral edema  Gastrointestinal: abdomen soft, NT, ND  Integumentary: Redness noted behind left ear descending left side of neck. Red spots noted at nape of neck, just below hairline. Neurological:  Mental Status: Awake, alert, oriented x 4, speech clear and appropriate  Attention: Intact  Language: No aphasia or dysarthria noted  Sensation: Intact to all extremities to light touch  Coordination: Intact  DTRs:    Right  Left    Smith's - -   ankle clonus  - -   toes (babinski)  - -     Cranial Nerves:  II: Visual acuity not tested, denies new visual changes / diplopia  III, IV, VI: PERRL, 3 mm bilaterally, EOMI, no nystagmus noted  V: Facial sensation intact bilaterally to touch  VII: Face symmetric  VIII: Hearing intact bilaterally to spoken voice  IX: Palate movement equal bilaterally  XI: Shoulder shrug equal bilaterally  XII: Tongue midline    Musculoskeletal:   Gait: Not tested   Assist devices: None   Tone: intact  Motor strength:    Right  Left    Right  Left    Deltoid  5 5  Hip Flex  5 5   Biceps  5 5  Knee Extensors  5 5   Triceps  5 5  Knee Flexors  5 5   Wrist Ext  5 5  Ankle Dorsiflex. 5 5   Wrist Flex  5 5  Ankle Plantarflex. 5 5   Handgrip  5 5  Ext Nicola Longus  5 5   Thumb Ext  5 5         Radiological Findings:  CT HEAD WO CONTRAST  Result Date: 5/30/2021  FINDINGS:   BRAIN/VENTRICLES: Ventricles are stable in size, shape, and position. Tip of  shunt catheter projects in region of 3rd ventricle. Ventricles are somewhat slit-like. No hydrocephalus noted. Subtle hypodense focus marginates the frontal horn left lateral ventricle, similar to prior. No obvious hemorrhage Cerebellar tonsils appears low lying extending below the level of foramen magnum. , similar to prior   ORBITS: The

## 2021-06-02 PROBLEM — B02.9 HERPES ZOSTER: Status: ACTIVE | Noted: 2021-06-02

## 2021-06-02 PROBLEM — G43.119 INTRACTABLE MIGRAINE WITH AURA WITHOUT STATUS MIGRAINOSUS: Status: ACTIVE | Noted: 2021-06-02

## 2021-06-02 PROCEDURE — 6360000002 HC RX W HCPCS: Performed by: INTERNAL MEDICINE

## 2021-06-02 PROCEDURE — 99255 IP/OBS CONSLTJ NEW/EST HI 80: CPT | Performed by: PSYCHIATRY & NEUROLOGY

## 2021-06-02 PROCEDURE — 2580000003 HC RX 258: Performed by: INTERNAL MEDICINE

## 2021-06-02 PROCEDURE — APPNB180 APP NON BILLABLE TIME > 60 MINS: Performed by: NURSE PRACTITIONER

## 2021-06-02 PROCEDURE — 99255 IP/OBS CONSLTJ NEW/EST HI 80: CPT | Performed by: INTERNAL MEDICINE

## 2021-06-02 PROCEDURE — 6370000000 HC RX 637 (ALT 250 FOR IP): Performed by: INTERNAL MEDICINE

## 2021-06-02 PROCEDURE — 1200000000 HC SEMI PRIVATE

## 2021-06-02 RX ADMIN — HYDROMORPHONE HYDROCHLORIDE 0.5 MG: 1 INJECTION, SOLUTION INTRAMUSCULAR; INTRAVENOUS; SUBCUTANEOUS at 14:02

## 2021-06-02 RX ADMIN — HYDROMORPHONE HYDROCHLORIDE 0.5 MG: 1 INJECTION, SOLUTION INTRAMUSCULAR; INTRAVENOUS; SUBCUTANEOUS at 04:42

## 2021-06-02 RX ADMIN — OXYCODONE 10 MG: 5 TABLET ORAL at 18:42

## 2021-06-02 RX ADMIN — DIPHENHYDRAMINE HYDROCHLORIDE 12.5 MG: 50 INJECTION, SOLUTION INTRAMUSCULAR; INTRAVENOUS at 08:17

## 2021-06-02 RX ADMIN — OXYCODONE 10 MG: 5 TABLET ORAL at 12:45

## 2021-06-02 RX ADMIN — HYDROMORPHONE HYDROCHLORIDE 0.5 MG: 1 INJECTION, SOLUTION INTRAMUSCULAR; INTRAVENOUS; SUBCUTANEOUS at 01:38

## 2021-06-02 RX ADMIN — DIPHENHYDRAMINE HYDROCHLORIDE 12.5 MG: 50 INJECTION, SOLUTION INTRAMUSCULAR; INTRAVENOUS at 20:10

## 2021-06-02 RX ADMIN — HYDROMORPHONE HYDROCHLORIDE 0.5 MG: 1 INJECTION, SOLUTION INTRAMUSCULAR; INTRAVENOUS; SUBCUTANEOUS at 21:50

## 2021-06-02 RX ADMIN — OXYCODONE 10 MG: 5 TABLET ORAL at 08:16

## 2021-06-02 RX ADMIN — ONDANSETRON 4 MG: 2 INJECTION INTRAMUSCULAR; INTRAVENOUS at 18:51

## 2021-06-02 RX ADMIN — ONDANSETRON 4 MG: 2 INJECTION INTRAMUSCULAR; INTRAVENOUS at 12:46

## 2021-06-02 RX ADMIN — LORAZEPAM 0.5 MG: 2 INJECTION INTRAMUSCULAR; INTRAVENOUS at 21:50

## 2021-06-02 RX ADMIN — OXYCODONE 10 MG: 5 TABLET ORAL at 03:36

## 2021-06-02 RX ADMIN — Medication 10 ML: at 20:10

## 2021-06-02 RX ADMIN — ENOXAPARIN SODIUM 40 MG: 40 INJECTION SUBCUTANEOUS at 08:17

## 2021-06-02 RX ADMIN — HYDROMORPHONE HYDROCHLORIDE 0.5 MG: 1 INJECTION, SOLUTION INTRAMUSCULAR; INTRAVENOUS; SUBCUTANEOUS at 09:27

## 2021-06-02 RX ADMIN — Medication 10 ML: at 08:17

## 2021-06-02 ASSESSMENT — PAIN DESCRIPTION - DESCRIPTORS
DESCRIPTORS: ACHING

## 2021-06-02 ASSESSMENT — PAIN SCALES - GENERAL
PAINLEVEL_OUTOF10: 9
PAINLEVEL_OUTOF10: 8
PAINLEVEL_OUTOF10: 8
PAINLEVEL_OUTOF10: 7
PAINLEVEL_OUTOF10: 5
PAINLEVEL_OUTOF10: 8
PAINLEVEL_OUTOF10: 7
PAINLEVEL_OUTOF10: 8
PAINLEVEL_OUTOF10: 7
PAINLEVEL_OUTOF10: 8
PAINLEVEL_OUTOF10: 7

## 2021-06-02 ASSESSMENT — PAIN DESCRIPTION - PROGRESSION
CLINICAL_PROGRESSION: NOT CHANGED

## 2021-06-02 ASSESSMENT — PAIN DESCRIPTION - PAIN TYPE
TYPE: ACUTE PAIN

## 2021-06-02 ASSESSMENT — PAIN - FUNCTIONAL ASSESSMENT
PAIN_FUNCTIONAL_ASSESSMENT: ACTIVITIES ARE NOT PREVENTED

## 2021-06-02 ASSESSMENT — PAIN DESCRIPTION - DIRECTION
RADIATING_TOWARDS: DOWN NECK

## 2021-06-02 ASSESSMENT — PAIN DESCRIPTION - ORIENTATION
ORIENTATION: MID

## 2021-06-02 ASSESSMENT — PAIN DESCRIPTION - LOCATION
LOCATION: HEAD

## 2021-06-02 ASSESSMENT — PAIN DESCRIPTION - ONSET
ONSET: ON-GOING

## 2021-06-02 ASSESSMENT — PAIN DESCRIPTION - FREQUENCY
FREQUENCY: CONTINUOUS

## 2021-06-02 NOTE — PROGRESS NOTES
Hospitalist Progress Note      PCP: Clement Knott MD    Date of Admission: 5/31/2021    Chief Complaint:     No chief complaint on file. headache    Subjective:  Patient seen and examined at the bedside.   No acute events overnight  Head pain is improving  Neurosurgery has evaluated shunt, it is working, no evidence of infection  Neurology consulted for headache  Weaning analgesia    PFHS: reviewed as documented 5/31/2021, no changes    Medications:  Reviewed    Infusion Medications    sodium chloride       Scheduled Medications    sodium chloride flush  5-40 mL Intravenous 2 times per day    enoxaparin  40 mg Subcutaneous Daily    nicotine  1 patch Transdermal Daily     PRN Meds: HYDROmorphone, diphenhydrAMINE, sodium chloride flush, sodium chloride, promethazine **OR** ondansetron, polyethylene glycol, promethazine, LORazepam, oxyCODONE **OR** oxyCODONE      Intake/Output Summary (Last 24 hours) at 6/2/2021 1139  Last data filed at 6/2/2021 0818  Gross per 24 hour   Intake 600 ml   Output --   Net 600 ml       Physical Exam    /73   Pulse 79   Temp 97.7 °F (36.5 °C) (Oral)   Resp 16   LMP 10/31/2016 (Exact Date)   SpO2 96%     General appearance:  No acute distress, appears stated age  Eyes: Pupils equal, round, reactive to light, conjunctiva/corneas clear  Ears/Nose/Mouth/Throat: No external lesions or scars, hearing intact to voice  Neck: Trachea midline, no masses noted, no thyromegaly  Respiratory:  Non-labored breathing, clear to auscultation bilaterally  Cardiovascular: Regular rate and rhythm, no murmurs, gallops, or rubs  Abdomen: soft, non-tender, non-distended  Musculoskeletal: Warm, well perfused, no cyanosis or edema  Skin: normal color, no wounds noted  Psychiatric: A&Ox4, good insight and judgment    Labs:   Recent Labs     05/31/21  0008 06/01/21  0602   WBC 13.7* 9.7   HGB 14.9 14.1   HCT 42.7 41.3    265     Recent Labs     05/30/21  2348 06/01/21  0602    141

## 2021-06-02 NOTE — PROGRESS NOTES
Pt is A&O, VSS. Tolerating diet well. Voiding adequately per bathroom. C/o pain in head that is sharp and rating it an 8/10, medicated per MAR. Resting in bed at this time. C/o nausea, IV medication given. Neuro checks remain unchanged. All fall precautions in place.

## 2021-06-02 NOTE — PROGRESS NOTES
5/30/2021  No acute intracranial abnormality. Ventricles appear somewhat slit-like, similar to prior. Low lying cerebellar tonsils. This is also unchanged     XR SHUNT SERIES PLACEMENT (<4 VIEWS)  Result Date: 5/30/2021  No evidence of shunt catheter discontinuity    MRI BRAIN W WO CONTRAST  Result Date: 6/1/2021  1. Left ventricular shunt tube with no abnormal enhancement or surrounding fluid collections. 2. Slitlike ventricles which remain midline with no intracranial edema identified. 3. Microangiopathic nonspecific changes in the centrum semiovale    Labs:  Recent Labs     06/01/21  0602   WBC 9.7   HGB 14.1   HCT 41.3          Recent Labs     06/01/21  0602      K 4.4      CO2 25   BUN 14   CREATININE 0.9   GLUCOSE 131*   CALCIUM 8.6       No results for input(s): PROTIME, INR, APTT in the last 72 hours. Patient Active Problem List    Diagnosis Date Noted    Headache 05/31/2021    Volume depletion 20/04/4330    Complicated UTI (urinary tract infection) 11/07/2019    Nausea & vomiting 42/28/9149    Renal colic 54/59/3718    Failure of outpatient treatment 11/07/2019    Acute cystitis without hematuria     Urinary retention     Guzman catheter in place     Lactic acidosis     Neutrophilic leukocytosis     Ureteritis 10/27/2019    Viral meningitis 06/30/2018    Pyuria 06/30/2018    Hypokalemia 06/30/2018    Sepsis ([address] 06/30/2018    Meningitis     Allergy to multiple antibiotics     Tobacco smoker 04/23/2018    Chronic nonintractable headache 04/23/2018    Onychomycosis 04/23/2018    Prediabetes 06/30/2016    PCOS (polycystic ovarian syndrome) 06/30/2016    Congenital hydrocephalus ([address] 10/22/2015    Depression with anxiety 02/07/2013     (ventriculoperitoneal) shunt status     Scoliosis        Assessment:  Patient is a 47 y.o. female w/ congenital hydrocephalus and possible  shunt malfunction with associated headache pain, dizziness, and rash.   shunt is

## 2021-06-02 NOTE — CONSULTS
Neurology consult Note    Dr. Laruie Jang requesting this consult. CC:    HPI:     Ms. Bruce Hollins is a 47year old woman with a PMH congenital hydrocephalus s/p  shunt after birth (with multiple revisions, last 10/2020 in [place]), migraines, nephrolithiasis who presented to the ED with headache. She tells me that she is not having a typical headache but rather burning, shock-like pain along her shunt. 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 She states she had this last year and developed a vesicular rash that tracked her shunt and ultimately lead to revision. She started having this rash again one week ago. As Per Outpatient Neurology Note: Stacia Saravia is a 47 y.o. female with medical history significant for congenital hydrocephalus s/p  shunt placement after birth with multiple revisions (last 10/2020), nephrolithiasis, anxiety who presents for management of headaches. HEADACHE HISTORY  Age at onset: ~ 12 years   Location: around her shunt on the left side- calls \"shunt headache\" but sometimes in the forehead, calls \"migraine\" . Frequency :\"shunt pain\" continuous, other headache- 2/week  Duration: : \"migraine\" pain lasts for hours to days   Character::\" shunt pain\" is sharp pain , \"migraine pain\" is throbbing. Intensity: 10/10- 5/10     Associated symptoms:   Photophobia: +  Phonophobia: +  Nausea: +  Vomiting: +  Osmophobia: -  Kinesiophobia: +  Auras: No   Autonomic features: No     Alleviating factors: resting and medications. Exacerbating factors: Movement, bright light. Pain awaken at night: Yes   Triggers: none clear. Other symptoms:   Neck pain /tightness: no   Psychological symptoms: Anxiety. Was seeing Psychiatry 9/2020. Sleep: good. No snoring, feels refresh after a good night sleep.    Teeth grinding / clenching: no   Head / neck injury: no   Family history of headache: Mother     ER visits: 3/2, 2/17, 2/9, 1/29, 1/23, 1 , 1/3,     Workup to date:   ~MRI Brain 2018 in [place]- [facility]   Multiple CT head     CURRENT ACUTE MANAGEMENT:  Tramadol 50mg- every day   Phenergan- everyday   Excedrin Migraine- 2/week. CURRENT PREVENTION:  None     PREVIOUSLY FAILED ACUTE MANAGEMENT: tylenol, Naproxen, Ibuprofen, toradol inj, aspirin,Tylenol, Metoclopramide, Butabitol, combinations. PREVIOUSLY FAILED PREVENTION:  Topamax, Depakote, amitriptyline, Effexor,Flexeril\"      Past Medical History:   Diagnosis Date    ADHD (attention deficit hyperactivity disorder) 1988    Anesthesia complication     pt states with general anesthesia, she gets very nauseated and wakes up with a migrain     Depression with anxiety     Difficult intubation     Encounter for imaging to screen for metal prior to MRI 2021    MRI Conditional Medtronic Non-Programmable shunt model#[id number] implanted 10/30/2020 at [facility]. Normal Mode. 1.5T or 3.0T.    ESBL (extended spectrum beta-lactamase) producing bacteria infection 2019    urine    Functional ovarian cysts 2008    rt ovary cyst x 2 yrs.     Headache(784.0)     migraines    Hiatal hernia     History of blood transfusion     History of kidney stones     History of PCOS     Hydrocephalus (HCC)     Irritable bowel syndrome     had colonoscopy about 6 yrs ago    Kidney stone     Meningitis     Neutrophilic leukocytosis     Nicotine dependence     Primary osteoarthritis of left knee 2016    S/P cone biopsy of cervix 2004    Scoliosis .s     (ventriculoperitoneal) shunt status     hydrocephalus f/w Neurosurgeon at [place] [address] Wears glasses     reading     Past Surgical History:   Procedure Laterality Date    APPENDECTOMY  2003    appendicitis     SECTION  2005    placenta previa    CHOLECYSTECTOMY      COLONOSCOPY  2004    COLPOSCOPY  2004    CSF SHUNT      replaced at age 17    CYSTOSCOPY      stone removal   [address] inguinal    HYSTERECTOMY, TOTAL ABDOMINAL  11/09/2016    TAHBSO, adhesions/PCOS    KNEE ARTHROSCOPY Left 1/7/2015    medial meniscectomy, chondroplasty, plica resection    LITHOTRIPSY Bilateral 12/10/2019    OTHER SURGICAL HISTORY  10/19/2016    op lap    VENTRICULOPERITONEAL SHUNT      multiple revisions, most recent 2015       CURRENT MEDICATIONS:    Current Facility-Administered Medications:     HYDROmorphone (DILAUDID) injection 0.5 mg, 0.5 mg, Intravenous, Q3H PRN, Deangelo Lara MD, 0.5 mg at 06/02/21 0927    diphenhydrAMINE (BENADRYL) injection 12.5 mg, 12.5 mg, Intravenous, Q6H PRN, Deangelo Lara MD, 12.5 mg at 06/02/21 0817    sodium chloride flush 0.9 % injection 5-40 mL, 5-40 mL, Intravenous, 2 times per day, Elisa Johns MD, 10 mL at 06/02/21 0817    sodium chloride flush 0.9 % injection 5-40 mL, 5-40 mL, Intravenous, PRN, Elisa Johns MD    0.9 % sodium chloride infusion, 25 mL, Intravenous, PRN, Elisa Johns MD    enoxaparin (LOVENOX) injection 40 mg, 40 mg, Subcutaneous, Daily, Elisa Johns MD, 40 mg at 06/02/21 0817    promethazine (PHENERGAN) tablet 12.5 mg, 12.5 mg, Oral, Q6H PRN, 12.5 mg at 05/31/21 4593 **OR** ondansetron (ZOFRAN) injection 4 mg, 4 mg, Intravenous, Q6H PRN, Elisa Johns MD, 4 mg at 05/31/21 1701    polyethylene glycol (GLYCOLAX) packet 17 g, 17 g, Oral, Daily PRN, Elisa Johns MD    promethazine (PHENERGAN) injection 6.25 mg, 6.25 mg, Intramuscular, Q4H PRN, Deangelo Lara MD, 6.25 mg at 06/01/21 0827    LORazepam (ATIVAN) injection 0.5 mg, 0.5 mg, Intravenous, Q4H PRN, Deangelo Lara MD, 0.5 mg at 06/01/21 2034    nicotine (NICODERM CQ) 14 MG/24HR 1 patch, 1 patch, Transdermal, Daily, Deangelo Lara MD, 1 patch at 06/01/21 0804    oxyCODONE (ROXICODONE) immediate release tablet 5 mg, 5 mg, Oral, Q4H PRN **OR** oxyCODONE (ROXICODONE) immediate release tablet 10 mg, 10 mg, Oral, Q4H PRN, Deangelo Lara, contrast:   1. Left ventricular shunt tube with no abnormal enhancement or surrounding fluid collections. 2. Slitlike ventricles which remain midline with no intracranial edema identified. 3. Microangiopathic nonspecific changes in the centrum semiovale     Assessment: 47year old with recent shunt revision (10/2020) and history of migraine headaches reports burning, neuropathic pain behind left ear and on abdomen. Given description and appearance, suspect this is secondary to VZV infection. Plan:  -ID consult   -Can follow up with Headache specialist as an outpatient to establish care in [place].   Leonides Chacon (), Vikash Govea ([facility]), and Betsey VIGILMontour ([place] [address]) are all excellent options based on her insurance  -We will sign off    FREDRICK Briggs-CNP  Neurology  [phone]

## 2021-06-02 NOTE — PLAN OF CARE
Problem: Falls - Risk of:  Goal: Will remain free from falls  Description: Will remain free from falls  6/2/2021 0209 by Nia Champagne RN  Outcome: Ongoing   Patient has remained free of falls. 2/4 bed rails up, bed locked and in lowest position, call light within reach. Patient instructed on use of call light and uses appropriately. Bed alarm on. Non-skid footwear and fall band on. Problem: Pain:  Goal: Pain level will decrease  Description: Pain level will decrease  6/2/2021 0209 by Nia Champagne RN  Outcome: Ongoing   Numeric pain rating scale being used. Patient repositioned for comfort. Ice applied. Patient is tolerating PO and IV pain medicine.

## 2021-06-02 NOTE — CONSULTS
Infectious Diseases Inpatient Consult Note    Reason for Consult:   Question  shunt infection  Requesting Physician:   Dr Dena Munoz  Primary Care Physician:  Catherine Parker MD  History Obtained From:   Pt, EPIC    Admit Date: 5/31/2021  Hospital Day: 3    CHIEF COMPLAINT:     'Shunt pain'    HISTORY OF PRESENT ILLNESS:      82-year-old woman with PMH migraines, congenital hydrocephalus and  shunt (placed after birth with multiple revisions)    Pt has hx  shunt revision 10/2020 (non-programmable Medtronic, MRI compatible). [facility], Dr. Mortimer Sprinkle. At that time, she had vesicular rash at along shunt (abd / neck / scalp). CSF + VZV IgG    Pt now presents with 1-2 weeks \"shunt pain\" (pain / sensitivity along course of shunt on scalp / neck / abdomen). She report associated dizziness and lethargy   She notes a rash posterior to her left ear and on upper left abdomen. 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 Pt reports ongoing 'shunt pain' - sharp pain at shunt and along course on scalp, lateral head and neck, worse with palpation. Rash improved. No HA      Past Medical History:    Past Medical History:   Diagnosis Date    ADHD (attention deficit hyperactivity disorder) 82    Anesthesia complication     pt states with general anesthesia, she gets very nauseated and wakes up with a migrain     Depression with anxiety     Difficult intubation     Encounter for imaging to screen for metal prior to MRI 06/01/2021    MRI Conditional Medtronic Non-Programmable shunt model#[id number] implanted 10/30/2020 at [facility]. Normal Mode.  1.5T or 3.0T.    ESBL (extended spectrum beta-lactamase) producing bacteria infection 11/06/2019    urine    Functional ovarian cysts 2008    rt ovary cyst x 2 yrs.     Headache(784.0)     migraines    Hiatal hernia     History of blood transfusion     History of kidney stones     History of PCOS     Hydrocephalus (HCC)     Irritable bowel syndrome 2004    had colonoscopy about 6 yrs ago    Kidney stone     Meningitis     Neutrophilic leukocytosis     Nicotine dependence     Primary osteoarthritis of left knee 2016    S/P cone biopsy of cervix 2004    Scoliosis 1990.s     (ventriculoperitoneal) shunt status     hydrocephalus f/w Neurosurgeon at [place] [address] Wears glasses     reading       Past Surgical History:    Past Surgical History:   Procedure Laterality Date    APPENDECTOMY  2003    appendicitis     SECTION  2005    placenta previa    CHOLECYSTECTOMY      COLONOSCOPY  2004    COLPOSCOPY      CSF SHUNT      replaced at age 17   [facility] CYSTOSCOPY      stone removal   Selene 72    inguinal    HYSTERECTOMY, TOTAL ABDOMINAL  2016    TAHBSO, adhesions/PCOS    KNEE ARTHROSCOPY Left 2015    medial meniscectomy, chondroplasty, plica resection    LITHOTRIPSY Bilateral 12/10/2019    OTHER SURGICAL HISTORY  10/19/2016    op lap    VENTRICULOPERITONEAL SHUNT      multiple revisions, most recent        Current Medications:     sodium chloride flush  5-40 mL Intravenous 2 times per day    enoxaparin  40 mg Subcutaneous Daily    nicotine  1 patch Transdermal Daily       Allergies:  Bentyl [dicyclomine hcl], Mushroom extract complex, Sulfa antibiotics, Vancomycin, Acetaminophen, Adhesive tape, Bee venom, Levaquin [levofloxacin], Toradol [ketorolac tromethamine], Ceftaroline, Daptomycin, Dicyclomine, Fioricet-codeine [butalbital-apap-caff-cod], Oxycodone-acetaminophen, and Zosyn [piperacillin sod-tazobactam so]    Social History:    TOBACCO:    + cig 1/2 ppd  ETOH:    None   DRUGS:   None   MARITAL STATUS:   Single   OCCUPATION:   None     Family History:   No immunodeficiency    REVIEW OF SYSTEMS:    No fever / chills / sweats. No weight loss. No visual change, eye pain, eye discharge. No oral lesion, sore throat, dysphagia. Denies cough / sputum. Denies chest pain, palpitations. Denies n / v / abd pain. No diarrhea. Denies dysuria or change in urinary function. Denies joint swelling or pain. No myalgia, arthralgia. Denies skin changes, itching  Denies focal weakness, sensory change or other neurologic symptom    Denies new / worse depression, psychiatric symptoms    PHYSICAL EXAM:      Vitals:    BP (!) 130/92   Pulse 82   Temp 98.9 °F (37.2 °C) (Oral)   Resp 14   LMP 10/31/2016 (Exact Date)   SpO2 95%     GENERAL: No apparent distress. HEENT: Membranes moist, no oral lesion. Scalp tender at shunt exit site and along shunt over scalp and neck  NECK:  Supple.  + maculopapular erythematous area posterior to L ear, no vesicle / wound.     LUNGS: Clear b/l, no rales, no dullness  CARDIAC: RRR, no murmur appreciated  ABD:  + BS, soft - healed scar, NO rash seen, tender LUQ, no guarding / rebound   EXT:  No rash, no edema, no lesions  NEURO: No focal neurologic findings  PSYCH: Orientation, sensorium, mood normal  LINES:  Peripheral iv    DATA:    Lab Results   Component Value Date    WBC 9.7 2021    HGB 14.1 2021    HCT 41.3 2021    MCV 85.5 2021     2021     Lab Results   Component Value Date    CREATININE 0.9 2021    BUN 14 2021     2021    K 4.4 2021     2021    CO2 25 2021       Hepatic Function Panel:   Lab Results   Component Value Date    ALKPHOS 99 2021    ALT 17 2021    AST 35 2021    PROT 7.9 2021    PROT 7.3 2011    BILITOT <0.2 2021    BILIDIR <0.2 2020    IBILI see below 2020    LABALBU 4.2 2021 Procalcitonin 0.03    Micro:  None     Imagin/1 MRI Brain w wo contrast  Impression:        1. Left ventricular shunt tube with no abnormal enhancement or surrounding fluid collections. 2. Slitlike ventricles which remain midline with no intracranial edema identified. 3. Microangiopathic nonspecific changes in the centrum semiovale     5/30 CT Head wo contrast  Impression   No acute intracranial abnormality.       Ventricles appear somewhat slit-like, similar to prior.       Low lying cerebellar tonsils.  This is also unchanged       5/30 XR Shunt Series Placement (<4 views)  FINDINGS: Left-sided  shunt catheter is seen. No obvious shunt catheter discontinuity. .  Nasal piercing is incidentally noted Heart size is normal.  There is mild curvature of the spine. No focal consolidation seen in the lungs Tip of  shunt catheter projects in the left lower pelvis. Bowel gas pattern is nonobstructive. Moderate stool seen in the colon.      No evidence of shunt catheter discontinuity       IMPRESSION:      Patient Active Problem List   Diagnosis     (ventriculoperitoneal) shunt status    Scoliosis    Prediabetes    PCOS (polycystic ovarian syndrome)    Tobacco smoker    Chronic nonintractable headache    Onychomycosis    Congenital hydrocephalus ([address]    Depression with anxiety    Viral meningitis    Pyuria    Hypokalemia    Sepsis ([address]    Meningitis    Allergy to multiple antibiotics    Ureteritis    Acute cystitis without hematuria    Urinary retention    Guzman catheter in place    Lactic acidosis    Neutrophilic leukocytosis    Complicated UTI (urinary tract infection)    Nausea & vomiting    Renal colic    Failure of outpatient treatment    Volume depletion    Headache    Herpes zoster    Intractable migraine with aura without status migrainosus       Hx migraines, congenital hydrocephalus and  shunt (placed after birth with multiple revisions)    Hx  shunt revision 10/2020 (non-programmable Medtronic, MRI compatible).  [facility] Dr. So Gloria.  At that time, she had vesicular rash at along shunt (abd / neck / scalp). CSF + VZV IgG    HA, lethargy   shunt tenderness at exit and along course  Red patchy posterior R ear, no vesicular eruptions seen    Overall - I am not sure what is problem. She does not have typical sx seen with  shunt infection. She has some tenderness along shut yet no inflammatory changes (redness / swelling over shunt). RECOMMENDATIONS:    Cont off antibiotics  Will confer with Neurosurg    - may be appropriate to tap shunt to r/o meningitis   - how to address possible 'slit ventricular syndrome'     Medical Decision Making: The following items were considered in medical decision making:  Discussion of patient care with other providers  Reviewed clinical lab tests  Reviewed radiology test results   Reviewed other diagnostic tests/interventions  Microbiology cultures  reviewed      Risk of Complications/Morbidity: High  poss  shunt infection  Illness(es)/ Infection present that pose threat to bodily function. There is potential for severe exacerbation of infection/side effects of treatment.   Therapy requires intensive monitoring for antimicrobial agent toxicity    Discussed with pt  Lynn Heath MD

## 2021-06-02 NOTE — PROGRESS NOTES
Patient alert and oriented x4. C/o headache, will medicate per [facility] as able. Up SBA. Fall precautions in place, call light within reach, bed alarm on, bed in lowest position, and non skid socks on. VSS. Denies needs at this time.

## 2021-06-02 NOTE — PLAN OF CARE
Problem: Falls - Risk of:  Goal: Will remain free from falls  Description: Will remain free from falls  6/2/2021 0755 by Jenny Brownlee RN  Outcome: Ongoing  Fall precautions in place. Bed is in lowest position, wheels locked, bed alarm on, non skid socks on. Call light and bedside table within reach. Pt calls out appropriately. Pt is up x1 SBA. Will continue to assess and monitor. Problem: Pain:  Goal: Pain level will decrease  Description: Pain level will decrease  6/2/2021 0755 by Jenny Brownlee RN  Outcome: Ongoing  Pt c/o head pain that she is rating 8/10. Medicated per MAR with IV and PO medications. Ice in place and pt repositioned.

## 2021-06-03 PROCEDURE — 6360000002 HC RX W HCPCS: Performed by: INTERNAL MEDICINE

## 2021-06-03 PROCEDURE — 6370000000 HC RX 637 (ALT 250 FOR IP): Performed by: INTERNAL MEDICINE

## 2021-06-03 PROCEDURE — 2580000003 HC RX 258: Performed by: INTERNAL MEDICINE

## 2021-06-03 PROCEDURE — 1200000000 HC SEMI PRIVATE

## 2021-06-03 PROCEDURE — 99233 SBSQ HOSP IP/OBS HIGH 50: CPT | Performed by: INTERNAL MEDICINE

## 2021-06-03 RX ADMIN — HYDROMORPHONE HYDROCHLORIDE 0.5 MG: 1 INJECTION, SOLUTION INTRAMUSCULAR; INTRAVENOUS; SUBCUTANEOUS at 17:43

## 2021-06-03 RX ADMIN — HYDROMORPHONE HYDROCHLORIDE 0.5 MG: 1 INJECTION, SOLUTION INTRAMUSCULAR; INTRAVENOUS; SUBCUTANEOUS at 13:42

## 2021-06-03 RX ADMIN — LORAZEPAM 0.5 MG: 2 INJECTION INTRAMUSCULAR; INTRAVENOUS at 02:37

## 2021-06-03 RX ADMIN — HYDROMORPHONE HYDROCHLORIDE 0.5 MG: 1 INJECTION, SOLUTION INTRAMUSCULAR; INTRAVENOUS; SUBCUTANEOUS at 02:37

## 2021-06-03 RX ADMIN — OXYCODONE 10 MG: 5 TABLET ORAL at 06:37

## 2021-06-03 RX ADMIN — PROMETHAZINE HYDROCHLORIDE 6.25 MG: 25 INJECTION INTRAMUSCULAR; INTRAVENOUS at 13:41

## 2021-06-03 RX ADMIN — Medication 10 ML: at 09:30

## 2021-06-03 RX ADMIN — ONDANSETRON 4 MG: 2 INJECTION INTRAMUSCULAR; INTRAVENOUS at 17:43

## 2021-06-03 RX ADMIN — Medication 10 ML: at 23:00

## 2021-06-03 RX ADMIN — OXYCODONE 10 MG: 5 TABLET ORAL at 00:38

## 2021-06-03 RX ADMIN — LORAZEPAM 0.5 MG: 2 INJECTION INTRAMUSCULAR; INTRAVENOUS at 09:52

## 2021-06-03 RX ADMIN — HYDROMORPHONE HYDROCHLORIDE 0.5 MG: 1 INJECTION, SOLUTION INTRAMUSCULAR; INTRAVENOUS; SUBCUTANEOUS at 09:38

## 2021-06-03 RX ADMIN — OXYCODONE 10 MG: 5 TABLET ORAL at 19:53

## 2021-06-03 RX ADMIN — DIPHENHYDRAMINE HYDROCHLORIDE 12.5 MG: 50 INJECTION, SOLUTION INTRAMUSCULAR; INTRAVENOUS at 21:35

## 2021-06-03 RX ADMIN — ONDANSETRON 4 MG: 2 INJECTION INTRAMUSCULAR; INTRAVENOUS at 09:30

## 2021-06-03 RX ADMIN — Medication 10 ML: at 19:52

## 2021-06-03 RX ADMIN — HYDROMORPHONE HYDROCHLORIDE 0.5 MG: 1 INJECTION, SOLUTION INTRAMUSCULAR; INTRAVENOUS; SUBCUTANEOUS at 21:35

## 2021-06-03 RX ADMIN — ONDANSETRON 4 MG: 2 INJECTION INTRAMUSCULAR; INTRAVENOUS at 22:59

## 2021-06-03 RX ADMIN — LORAZEPAM 0.5 MG: 2 INJECTION INTRAMUSCULAR; INTRAVENOUS at 19:53

## 2021-06-03 RX ADMIN — Medication 10 ML: at 21:34

## 2021-06-03 RX ADMIN — ENOXAPARIN SODIUM 40 MG: 40 INJECTION SUBCUTANEOUS at 09:39

## 2021-06-03 ASSESSMENT — PAIN SCALES - GENERAL
PAINLEVEL_OUTOF10: 10
PAINLEVEL_OUTOF10: 8
PAINLEVEL_OUTOF10: 7
PAINLEVEL_OUTOF10: 10
PAINLEVEL_OUTOF10: 8
PAINLEVEL_OUTOF10: 4
PAINLEVEL_OUTOF10: 8
PAINLEVEL_OUTOF10: 10
PAINLEVEL_OUTOF10: 5
PAINLEVEL_OUTOF10: 6
PAINLEVEL_OUTOF10: 7

## 2021-06-03 ASSESSMENT — PAIN - FUNCTIONAL ASSESSMENT
PAIN_FUNCTIONAL_ASSESSMENT: ACTIVITIES ARE NOT PREVENTED

## 2021-06-03 ASSESSMENT — PAIN DESCRIPTION - ONSET
ONSET: ON-GOING

## 2021-06-03 ASSESSMENT — PAIN DESCRIPTION - ORIENTATION
ORIENTATION: MID
ORIENTATION: MID
ORIENTATION: LEFT
ORIENTATION: LEFT;MID
ORIENTATION: LEFT;MID
ORIENTATION: LEFT

## 2021-06-03 ASSESSMENT — PAIN DESCRIPTION - FREQUENCY
FREQUENCY: CONTINUOUS

## 2021-06-03 ASSESSMENT — PAIN DESCRIPTION - DIRECTION
RADIATING_TOWARDS: DOWN NECK

## 2021-06-03 ASSESSMENT — PAIN DESCRIPTION - DESCRIPTORS
DESCRIPTORS: ACHING
DESCRIPTORS: ACHING
DESCRIPTORS: PRESSURE
DESCRIPTORS: PRESSURE
DESCRIPTORS: PRESSURE;ACHING
DESCRIPTORS: PRESSURE;ACHING
DESCRIPTORS: PRESSURE

## 2021-06-03 ASSESSMENT — PAIN DESCRIPTION - PAIN TYPE
TYPE: ACUTE PAIN

## 2021-06-03 ASSESSMENT — PAIN DESCRIPTION - LOCATION
LOCATION: NECK
LOCATION: NECK
LOCATION: HEAD
LOCATION: NECK
LOCATION: HEAD
LOCATION: NECK;HEAD
LOCATION: NECK;HEAD
LOCATION: NECK

## 2021-06-03 ASSESSMENT — PAIN DESCRIPTION - PROGRESSION
CLINICAL_PROGRESSION: NOT CHANGED
CLINICAL_PROGRESSION: GRADUALLY WORSENING

## 2021-06-03 NOTE — PROGRESS NOTES
ID Follow-up NOTE    CC:   Question  shunt infection  Antibiotics: None     Admit Date: 2021  Hospital Day: 4    Subjective:     Patient c/o discomfort / sensitive at shunt exit site and along course - scalp / posterior to L ear / neck  No HA, neck stiffness      Objective:     Patient Vitals for the past 8 hrs:   BP Temp Temp src Pulse Resp SpO2 Height Weight   21 0945 -- -- -- -- -- -- 4' 11\" (1.499 m) 157 lb 10.1 oz (71.5 kg)   21 0930 126/80 98 °F (36.7 °C) Oral 83 16 95 % -- --     I/O last 3 completed shifts: In: 360 [P.O.:360]  Out: 0   No intake/output data recorded. EXAM:  GENERAL: No apparent distress. RA  HEENT: Membranes moist, no oral lesion  Tender over shunt exit, along scalp / post L auricle / neck  NECK:  Supple, no lymphadenopathy  LUNGS: Clear b/l, no rales, no dullness  CARDIAC: RRR, no murmur appreciated. Tender L medial chest over subcutaneous  shunt course  ABD:  + BS, soft / NT  EXT:  No rash, no edema, no lesions  NEURO: No focal neurologic findings  PSYCH: Orientation, sensorium, mood normal  LINES:  Peripheral iv       Data Review:  Lab Results   Component Value Date    WBC 9.7 2021    HGB 14.1 2021    HCT 41.3 2021    MCV 85.5 2021     2021     Lab Results   Component Value Date    CREATININE 0.9 2021    BUN 14 2021     2021    K 4.4 2021     2021    CO2 25 2021       Hepatic Function Panel:   Lab Results   Component Value Date    ALKPHOS 99 2021    ALT 17 2021    AST 35 2021    PROT 7.9 2021    PROT 7.3 2011    BILITOT <0.2 2021    BILIDIR <0.2 2020    IBILI see below 2020    LABALBU 4.2 2021 Procalcitonin 0.03     Micro:  None      Imagin/1 MRI Brain w wo contrast  Impression:         1. Left ventricular shunt tube with no abnormal enhancement or surrounding fluid collections.    2. Slitlike ventricles which decision making:  Discussion of patient care with other providers - Neurosurg, Neuro, Radiologist  Reviewed clinical lab tests  Reviewed radiology tests  Reviewed diagnostic tests/interventions  Independent review of radiologic images - reviewed CT, MRI, shunt series with Radiologist 6/3    Spent 35 minutes on visit   .   Discussed with pt, Radiologist, Neurosurg NP and Attending, Neuro NP  Raoul Lopes MD

## 2021-06-03 NOTE — PROGRESS NOTES
Neurosurgery Progress Note    Patient seen and examined on 06/02/21. No acute events overnight. Reports persistent head \"pressure\" that is longstanding and worsens with sitting up. Denies any distinct headache, malaise, or pain. Neurologically intact on exam. Imaging without evidence of fluid collection or change in ventricular size. A/P: 48 yo woman with shunted hydrocephalus from infancy who presented with head pressure and rash. -Neuro stable  -HOB elevated  -Frequent neuro checks  -Recommend neurology consultation for pressure sensation/headaches  -ID consulted for rash  -No role for neurosurgical intervention at this time. Please call for any changes in exam or new concerns.  -Will follow peripherally while in house. Please call with questions.      Sherwin Avery MD, PhD  [address], [address] [place], [place], [address] [phone] (c), [phone] (o)

## 2021-06-03 NOTE — PROGRESS NOTES
4 Eyes Admission Assessment     I agree as the admission nurse that 2 RN's have performed a thorough Head to Toe Skin Assessment on the patient. ALL assessment sites listed below have been assessed on admission. Areas assessed by both nurses: all  [x]   Head, Face, and Ears   [x]   Shoulders, Back, and Chest  [x]   Arms, Elbows, and Hands   [x]   Coccyx, Sacrum, and Ischium  [x]   Legs, Feet, and Heels        Does the Patient have Skin Breakdown?   redness / scab behind L ear         Noel Prevention initiated:  No   Wound Care Orders initiated:  No      C nurse consulted for Pressure Injury (Stage 3,4, Unstageable, DTI, NWPT, and Complex wounds) or Noel score 18 or lower:  No      Nurse 1 eSignature: Electronically signed by Abeba Medina RN on 6/3/21 at 6:20 PM EDT    **SHARE this note so that the co-signing nurse is able to place an eSignature**    Nurse 2 eSignature: Electronically signed by Nicholas Ware RN on 6/4/21 at 1:32 AM EDT

## 2021-06-03 NOTE — PROGRESS NOTES
Hospitalist Progress Note      PCP: Heena Ashraf MD    Date of Admission: 5/31/2021    Chief Complaint:     No chief complaint on file. headache    Subjective:  Patient seen and examined at the bedside.   No acute events overnight  Still has L sided head pressure along  shunt tract  She is concerned about a rash behind her ear, nothing significant noted  ID consulted, may need shunt tapped    PFHS: reviewed as documented 5/31/2021, no changes    Medications:  Reviewed    Infusion Medications    sodium chloride       Scheduled Medications    sodium chloride flush  5-40 mL Intravenous 2 times per day    enoxaparin  40 mg Subcutaneous Daily    nicotine  1 patch Transdermal Daily     PRN Meds: HYDROmorphone, diphenhydrAMINE, sodium chloride flush, sodium chloride, promethazine **OR** ondansetron, polyethylene glycol, promethazine, LORazepam, oxyCODONE **OR** oxyCODONE      Intake/Output Summary (Last 24 hours) at 6/3/2021 1107  Last data filed at 6/2/2021 1341  Gross per 24 hour   Intake 120 ml   Output --   Net 120 ml       Physical Exam    /80   Pulse 83   Temp 98 °F (36.7 °C)   Resp 18   Ht 4' 11\" (1.499 m)   Wt 157 lb 10.1 oz (71.5 kg)   LMP 10/31/2016 (Exact Date)   SpO2 95%   BMI 31.84 kg/m²     General appearance:  No acute distress, appears stated age  Eyes: Pupils equal, round, reactive to light, conjunctiva/corneas clear  Ears/Nose/Mouth/Throat: No external lesions or scars, hearing intact to voice  Neck: Trachea midline, no masses noted, no thyromegaly  Respiratory:  Non-labored breathing, clear to auscultation bilaterally  Cardiovascular: Regular rate and rhythm, no murmurs, gallops, or rubs  Abdomen: soft, non-tender, non-distended  Musculoskeletal: Warm, well perfused, no cyanosis or edema  Skin: normal color, no wounds noted  Psychiatric: A&Ox4, good insight and judgmen  Labs:   Recent Labs     06/01/21  0602   WBC 9.7   HGB 14.1   HCT 41.3        Recent Labs 06/01/21  0602      K 4.4      CO2 25   BUN 14   CREATININE 0.9   CALCIUM 8.6     No results for input(s): AST, ALT, BILIDIR, BILITOT, ALKPHOS in the last 72 hours. No results for input(s): INR in the last 72 hours. No results for input(s): Mauricio Sol in the last 72 hours. Urinalysis:      Lab Results   Component Value Date    NITRU Negative 07/09/2020    WBCUA 3 06/29/2020    BACTERIA 1+ 04/15/2020    RBCUA 1 06/29/2020    BLOODU Negative 07/09/2020    SPECGRAV >1.030 07/09/2020    GLUCOSEU Negative 07/09/2020    GLUCOSEU NEGATIVE 10/08/2011       Radiology:  MRI BRAIN W WO CONTRAST   Final Result   1. Left ventricular shunt tube with no abnormal enhancement or surrounding fluid collections. 2. Slitlike ventricles which remain midline with no intracranial edema identified.    3. Microangiopathic nonspecific changes in the centrum semiovale          Assessment/Plan:    Active Hospital Problems    Diagnosis Date Noted    Herpes zoster [B02.9] 06/02/2021    Intractable migraine with aura without status migrainosus [G43.119] 06/02/2021    Headache [R51.9] 05/31/2021    Tobacco smoker [Z72.0] 04/23/2018    Congenital hydrocephalus ([facility] [Q03.9] 10/22/2015     (ventriculoperitoneal) shunt status [Z98.2]        Plan:    # Congenital hydrocephalus  #  shunt malfunction, ruled out  -neurosurgery consulted, shunt functioning, no evidence of infection  -will hold on antibiotics, cultures nothing yet  -neuro checks  -analgesia/antiemetics as needed, weaning     # PCOS  # Depression/anxiety     # Nicotine dependence  -advised to abstain  -nicotine patch    DVT Prophylaxis: Lovenox  Diet: DIET GENERAL;  Code Status: Full Code    PT/OT Eval Status: n/a, baseline    Dispo: [facility] pending clinical improvement    Chun Tong MD

## 2021-06-04 VITALS
WEIGHT: 157.63 LBS | SYSTOLIC BLOOD PRESSURE: 103 MMHG | BODY MASS INDEX: 31.78 KG/M2 | RESPIRATION RATE: 18 BRPM | DIASTOLIC BLOOD PRESSURE: 63 MMHG | OXYGEN SATURATION: 94 % | HEIGHT: 59 IN | TEMPERATURE: 97.6 F | HEART RATE: 81 BPM

## 2021-06-04 PROCEDURE — 2580000003 HC RX 258: Performed by: INTERNAL MEDICINE

## 2021-06-04 PROCEDURE — 6360000002 HC RX W HCPCS: Performed by: INTERNAL MEDICINE

## 2021-06-04 PROCEDURE — 99232 SBSQ HOSP IP/OBS MODERATE 35: CPT | Performed by: INTERNAL MEDICINE

## 2021-06-04 PROCEDURE — 6370000000 HC RX 637 (ALT 250 FOR IP): Performed by: INTERNAL MEDICINE

## 2021-06-04 RX ORDER — PROMETHAZINE HYDROCHLORIDE 12.5 MG/1
12.5 TABLET ORAL EVERY 6 HOURS PRN
Qty: 30 TABLET | Refills: 0 | Status: SHIPPED | OUTPATIENT
Start: 2021-06-04 | End: 2021-06-11

## 2021-06-04 RX ORDER — OXYCODONE HYDROCHLORIDE 5 MG/1
5 TABLET ORAL EVERY 6 HOURS PRN
Qty: 20 TABLET | Refills: 0 | Status: SHIPPED | OUTPATIENT
Start: 2021-06-04 | End: 2021-06-09

## 2021-06-04 RX ADMIN — DIPHENHYDRAMINE HYDROCHLORIDE 12.5 MG: 50 INJECTION, SOLUTION INTRAMUSCULAR; INTRAVENOUS at 03:19

## 2021-06-04 RX ADMIN — ONDANSETRON 4 MG: 2 INJECTION INTRAMUSCULAR; INTRAVENOUS at 04:54

## 2021-06-04 RX ADMIN — Medication 10 ML: at 05:34

## 2021-06-04 RX ADMIN — HYDROMORPHONE HYDROCHLORIDE 0.5 MG: 1 INJECTION, SOLUTION INTRAMUSCULAR; INTRAVENOUS; SUBCUTANEOUS at 00:37

## 2021-06-04 RX ADMIN — PROMETHAZINE HYDROCHLORIDE 6.25 MG: 25 INJECTION INTRAMUSCULAR; INTRAVENOUS at 08:33

## 2021-06-04 RX ADMIN — Medication 10 ML: at 04:17

## 2021-06-04 RX ADMIN — HYDROMORPHONE HYDROCHLORIDE 0.5 MG: 1 INJECTION, SOLUTION INTRAMUSCULAR; INTRAVENOUS; SUBCUTANEOUS at 03:19

## 2021-06-04 RX ADMIN — ENOXAPARIN SODIUM 40 MG: 40 INJECTION SUBCUTANEOUS at 08:33

## 2021-06-04 RX ADMIN — Medication 10 ML: at 00:36

## 2021-06-04 RX ADMIN — LORAZEPAM 0.5 MG: 2 INJECTION INTRAMUSCULAR; INTRAVENOUS at 00:03

## 2021-06-04 RX ADMIN — Medication 10 ML: at 03:19

## 2021-06-04 RX ADMIN — LORAZEPAM 0.5 MG: 2 INJECTION INTRAMUSCULAR; INTRAVENOUS at 04:17

## 2021-06-04 RX ADMIN — Medication 10 ML: at 04:59

## 2021-06-04 RX ADMIN — OXYCODONE 10 MG: 5 TABLET ORAL at 10:37

## 2021-06-04 RX ADMIN — OXYCODONE 10 MG: 5 TABLET ORAL at 04:16

## 2021-06-04 RX ADMIN — Medication 10 ML: at 08:33

## 2021-06-04 RX ADMIN — OXYCODONE 10 MG: 5 TABLET ORAL at 00:03

## 2021-06-04 ASSESSMENT — PAIN DESCRIPTION - LOCATION
LOCATION: NECK;HEAD
LOCATION: HEAD;NECK
LOCATION: NECK;HEAD

## 2021-06-04 ASSESSMENT — PAIN DESCRIPTION - PROGRESSION
CLINICAL_PROGRESSION: NOT CHANGED
CLINICAL_PROGRESSION: OTHER (COMMENT)
CLINICAL_PROGRESSION: NOT CHANGED

## 2021-06-04 ASSESSMENT — PAIN DESCRIPTION - ONSET
ONSET: ON-GOING

## 2021-06-04 ASSESSMENT — PAIN DESCRIPTION - DESCRIPTORS
DESCRIPTORS: PRESSURE;ACHING
DESCRIPTORS: PRESSURE;ACHING
DESCRIPTORS: ACHING;PRESSURE
DESCRIPTORS: PRESSURE;ACHING
DESCRIPTORS: PRESSURE;ACHING

## 2021-06-04 ASSESSMENT — PAIN SCALES - GENERAL
PAINLEVEL_OUTOF10: 0
PAINLEVEL_OUTOF10: 7
PAINLEVEL_OUTOF10: 7
PAINLEVEL_OUTOF10: 4
PAINLEVEL_OUTOF10: 6
PAINLEVEL_OUTOF10: 7
PAINLEVEL_OUTOF10: 8
PAINLEVEL_OUTOF10: 7
PAINLEVEL_OUTOF10: 8

## 2021-06-04 ASSESSMENT — PAIN DESCRIPTION - PAIN TYPE
TYPE: ACUTE PAIN

## 2021-06-04 ASSESSMENT — PAIN DESCRIPTION - FREQUENCY
FREQUENCY: CONTINUOUS

## 2021-06-04 ASSESSMENT — PAIN DESCRIPTION - DIRECTION
RADIATING_TOWARDS: DOWN NECK

## 2021-06-04 ASSESSMENT — PAIN - FUNCTIONAL ASSESSMENT
PAIN_FUNCTIONAL_ASSESSMENT: ACTIVITIES ARE NOT PREVENTED

## 2021-06-04 ASSESSMENT — PAIN DESCRIPTION - ORIENTATION
ORIENTATION: LEFT;MID
ORIENTATION: MID
ORIENTATION: LEFT;MID

## 2021-06-04 NOTE — PROGRESS NOTES
Following secure message sent to house MD:    Patient admitted with HA and neck pain. Neuro following for possible overshunting of  shunt. Patient is concerned that the left side of her neck and head is more swollen. She states with the swelling that her pain is not controlled and was wondering if we could increase the dilaudid 0.5mg she is getting Q4hr to 1mg. Please advise.      Electronically signed by Nicholas Lamas RN on 6/3/2021 at 11:19 PM

## 2021-06-04 NOTE — PROGRESS NOTES
Patient is A&O x4.  RA, sat 95%. No complaints of SOB. Medicated for c/o neck and head pain as needed. C/o occasional dizzy when up to bathroom. Respirations appear to easy and unlabored. Lungs clear. Respirations easy with no complaints of cough. No complaints of nausea/vomiting/diarrhea at this time. Up with standby assist to the bathroom/BSC as needed. Right hand PIV intact and flushed. Tolerating regular diet. Plan of care and safety measures reviewed with the patient. Call light in reach and bed alarm in place. Will continue to monitor.   Electronically signed by Guillermina Westfall RN on 6/3/2021 at 10:04 PM

## 2021-06-04 NOTE — PROGRESS NOTES
Patient complaining of nausea, medicated with zofran IVP. Will continue to monitor.   Electronically signed by Taylor Quesada RN on 6/4/2021 at 6:08 AM

## 2021-06-04 NOTE — CARE COORDINATION
Case Management Assessment            Discharge Note                    Date / Time of Note: 6/4/2021 11:10 AM                  Discharge Note Completed by: Acosta Jerry RN    Patient Name: Ceci Capellan   YOB: 1980  Diagnosis: Headache [R51.9]   Date / Time: 5/31/2021  6:13 AM    Current PCP: Nelda Dubin, MD  Clinic patient: No    Hospitalization in the last 30 days: No    Advance Directives:  Code Status: Full Code  [place] DNR form completed and on chart: Not Indicated    Financial:  Payor: Nader Grace / Plan: Ede Infante / Product Type: *No Product type* /      Ph[facility]:    [facility] [address] [phone] - f [phone]  [address] [place] [address]  Phone: [phone] Fax: [phone]    [facility] [address] [facility] [address] [facility] [place] [phone] - F [phone]  [address] [place] [address]  Phone: [phone] Fax: [phone]    [facility] [address] [facility] [phone] [facility] [phone]  [address] [place] [address]  Phone: [phone] Fax: [phone]      Assistance purchasing medications?: Potential Assistance Purchasing Medications: No  Assistance provided by Case Management: None at this time    Does patient want to participate in local refill/ meds to beds program?: No    Meds To Beds General Rules:  1. Can ONLY be done Monday- Friday between 8:30am-5pm  2. Prescription(s) must be in pharmacy by 3pm to be filled same day  3. Copy of patient's insurance/ prescription drug card and patient face sheet must be sent along with the prescription(s)  4. Cost of Rx cannot be added to hospital bill. If financial assistance is needed, please contact unit  or ;  or  CANNOT provide pharmacy voucher for patients co-pays  5.  Patients can then  the prescription on their way out of the hospital at discharge, or pharmacy can deliver to the bedside if staff is available. (payment due at time of pick-up or delivery - cash, check, or card accepted)     Able to afford home medications/ co-pay costs: Yes    ADLS:  Current PT AM-PAC Score:   /24  Current OT AM-PAC Score:   /24      DISCHARGE Disposition: Home- No Services Needed    LOC at discharge: Not Applicable  LINA Completed: Not Indicated    Notification completed in HENS/PAS?:  Not Applicable    IMM Completed:   Not Indicated    Transportation:  Transportation PLAN for discharge: family   Mode of Transport: [address]:  [address] ordered at discharge: Not [address]: Not Applicable  Orders faxed: No    Durable Medical Equipment:  DME Provider: none  Equipment obtained during hospitalization:     Home Oxygen and Respiratory Equipment:  Oxygen needed at discharge?: Not [address]: Not Applicable  Portable tank available for discharge?: Not Indicated    Dialysis:  Dialysis patient: No    Dialysis Center:  Not Applicable      Additional CM Notes: Pt from home no needs at DC will Dc home with mothers support    The Plan for Transition of Care is related to the following treatment goals of Headache [R51.9]    The Patient and/or patient representative Petrona and her family were provided with a choice of provider and agrees with the discharge plan Yes    Freedom of choice list was provided with basic dialogue that supports the patient's individualized plan of care/goals and shares the quality data associated with the providers.  Not Indicated    Care Transitions patient: Yes    Pebbles Lawson RN  [facility]  Case Management Department  Ph: [phone]  Fax: [phone]

## 2021-06-04 NOTE — PROGRESS NOTES
Patient complaining of nausea, medicated with zofran IVP. Will continue to monitor.   Electronically signed by Grecia Hartman RN on 6/4/2021 at 6:08 AM

## 2021-06-04 NOTE — PROGRESS NOTES
ID Follow-up NOTE    CC:   Question  shunt infection  Antibiotics: None     Admit Date: 2021  Hospital Day: 5    Subjective:     Patient c/o discomfort / sensitive at shunt exit site and along course - scalp / posterior to L ear / neck  Associated with nausea, no emesis  No HA, neck stiffness      Objective:     Patient Vitals for the past 8 hrs:   BP Temp Temp src Pulse Resp SpO2   21 0830 103/63 97.6 °F (36.4 °C) Oral 81 18 94 %   21 0545 (!) 140/85 -- -- 93 20 --   21 0522 92/64 -- -- 95 18 93 %   21 0420 (!) 118/93 97.9 °F (36.6 °C) Oral 103 18 96 %     I/O last 3 completed shifts: In: 1480 [P.O.:1480]  Out: 500 [Urine:500]  No intake/output data recorded. EXAM:  GENERAL: No apparent distress. RA  HEENT: Membranes moist, no oral lesion  Tender over shunt exit, along scalp / post L auricle / neck  NECK:  Supple, no lymphadenopathy  LUNGS: Clear b/l, no rales, no dullness  CARDIAC: RRR, no murmur appreciated. Tender L medial chest over subcutaneous  shunt course  ABD:  + BS, soft / NT  EXT:  No rash, no edema, no lesions  NEURO: No focal neurologic findings  PSYCH: Orientation, sensorium, mood normal  LINES:  Peripheral iv       Data Review:  Lab Results   Component Value Date    WBC 9.7 2021    HGB 14.1 2021    HCT 41.3 2021    MCV 85.5 2021     2021     Lab Results   Component Value Date    CREATININE 0.9 2021    BUN 14 2021     2021    K 4.4 2021     2021    CO2 25 2021       Hepatic Function Panel:   Lab Results   Component Value Date    ALKPHOS 99 2021    ALT 17 2021    AST 35 2021    PROT 7.9 2021    PROT 7.3 2011    BILITOT <0.2 2021    BILIDIR <0.2 2020    IBILI see below 2020    LABALBU 4.2 2021 Procalcitonin 0.03     Micro:  None      Imagin/1 MRI Brain w wo contrast  Impression:         1.  Left ventricular shunt tube with no abnormal enhancement or surrounding fluid collections. 2. Slitlike ventricles which remain midline with no intracranial edema identified. 3. Microangiopathic nonspecific changes in the centrum semiovale      5/30 CT Head wo contrast  Impression   No acute intracranial abnormality.       Ventricles appear somewhat slit-like, similar to prior.       Low lying cerebellar tonsils.  This is also unchanged         5/30 XR Shunt Series Placement (<4 views)  FINDINGS: Left-sided  shunt catheter is seen.  No obvious shunt catheter discontinuity. Ken Guan piercing is incidentally noted Heart size is normal.  There is mild curvature of the spine.  No focal consolidation seen in the lungs Tip of  shunt catheter projects in the left lower pelvis.  Bowel gas pattern is nonobstructive.  Moderate stool seen in the colon.      No evidence of shunt catheter discontinuity       Scheduled Meds:   sodium chloride flush  5-40 mL Intravenous 2 times per day    enoxaparin  40 mg Subcutaneous Daily    nicotine  1 patch Transdermal Daily       Continuous Infusions:   sodium chloride         PRN Meds:  HYDROmorphone, diphenhydrAMINE, sodium chloride flush, sodium chloride, promethazine **OR** ondansetron, polyethylene glycol, promethazine, LORazepam, oxyCODONE **OR** oxyCODONE      Assessment:     Hx migraines, congenital hydrocephalus and  shunt (placed after birth with multiple revisions)     Hx  shunt revision 10/2020 (non-programmable Medtronic, MRI compatible). [facility], Dr. Kayce Ayers. At that time, she had vesicular rash at along shunt (abd / neck / scalp). CSF + VZV IgG     HA, lethargy   shunt tenderness at exit and along course  Red patchy posterior R ear, no vesicular eruptions seen     Doubt  shunt infection.     Possible reaction to shunt material     Plan:     Cont off antibiotics  Would not pursue shunt tap or LP at this time    Referral allergist - poss reaction to silicone tubing / material    Medical Decision Making: The following items were considered in medical decision making:  Discussion of patient care with other providers - Neurosurg, Neuro, Radiologist  Reviewed clinical lab tests  Reviewed radiology tests  Reviewed diagnostic tests/interventions  Independent review of radiologic images - reviewed CT, MRI, shunt series with Radiologist 6/3   .   Discussed with pt  Discussed with  Radiologist, Neurosurg NP and Attending, Neuro NP on 6/4  Will sign off, call with ID issues   Lorna Polanco MD

## 2021-06-04 NOTE — DISCHARGE SUMMARY
Hospital Medicine Discharge Summary    Patient ID: Yovany Adam      Patient's PCP: Jame Zuniga MD    Admit Date: 5/31/2021     Discharge Date: 6/4/2021    Admitting Physician: Briana Carranza MD     Discharge Physician: Arabella Rose MD     Discharge Diagnoses: Active Hospital Problems    Diagnosis Date Noted    Herpes zoster [B02.9] 06/02/2021    Intractable migraine with aura without status migrainosus [G43.119] 06/02/2021    Headache [R51.9] 05/31/2021    Tobacco smoker [Z72.0] 04/23/2018    Congenital hydrocephalus ([facility] [address] [Q03.9] 10/22/2015     (ventriculoperitoneal) shunt status [Z98.2]      See plan in progress note from this date for full hospital problem list.    The patient was seen and examined on day of discharge and this discharge summary is in conjunction with any daily progress note from day of discharge. Hospital Course:    sided head pressure radiating into the neck. She has h/o congenital hydrocephalus w/  shunt in place that has been complicated by malfunction multiple times. This feels similar to previous episodes. # Congenital hydrocephalus  #  shunt malfunction, ruled out  -neurosurgery consulted, shunt functioning, no evidence of infection  -will hold on antibiotics, cultures nothing yet  -neuro checks  -analgesia/antiemetics as needed, weaning     # PCOS  # Depression/anxiety     # Nicotine dependence  -advised to abstain  -nicotine patch    Physical Exam Performed:     /63   Pulse 81   Temp 97.6 °F (36.4 °C) (Oral)   Resp 18   Ht 4' 11\" (1.499 m)   Wt 157 lb 10.1 oz (71.5 kg)   LMP 10/31/2016 (Exact Date)   SpO2 94%   BMI 31.84 kg/m²     Please see progress note from this date    Labs:  For convenience and continuity at follow-up the following most recent labs are provided:      CBC:    Lab Results   Component Value Date    WBC 9.7 06/01/2021    HGB 14.1 06/01/2021    HCT 41.3 06/01/2021     06/01/2021       Renal:    Lab Results Component Value Date     06/01/2021    K 4.4 06/01/2021     06/01/2021    CO2 25 06/01/2021    BUN 14 06/01/2021    CREATININE 0.9 06/01/2021    CALCIUM 8.6 06/01/2021    PHOS 4.0 02/20/2020         Significant Diagnostic Studies    Radiology:   MRI BRAIN W WO CONTRAST   Final Result   1. Left ventricular shunt tube with no abnormal enhancement or surrounding fluid collections. 2. Slitlike ventricles which remain midline with no intracranial edema identified. 3. Microangiopathic nonspecific changes in the centrum semiovale             Consults:     IP CONSULT TO NEUROSURGERY  IP CONSULT TO NEUROLOGY  IP CONSULT TO INFECTIOUS DISEASES    Disposition:  Home    Condition at Discharge: Stable    Discharge Instructions/Follow-up:  Follow up with PCP in 1 week for hospital follow-up and to review any pending labs/tests. Please follow other discharge instructions as outlined in the discharge instructions    Code Status:  Full Code    Activity: activity as tolerated    Diet: DIET GENERAL;    Discharge Medications:     Current Discharge Medication List           Details   promethazine (PHENERGAN) 12.5 MG tablet Take 1 tablet by mouth every 6 hours as needed for Nausea  Qty: 30 tablet, Refills: 0      oxyCODONE (ROXICODONE) 5 MG immediate release tablet Take 1 tablet by mouth every 6 hours as needed for Pain for up to 5 days. Intended supply: 5 days.  Take lowest dose possible to manage pain  Qty: 20 tablet, Refills: 0    Comments: Reduce doses taken as pain becomes manageable  Associated Diagnoses: Intractable migraine with aura without status migrainosus              Details   benzonatate (TESSALON) 100 MG capsule Take 100 mg by mouth 3 times daily as needed for Cough      famotidine (PEPCID) 10 MG tablet Take 2 tablets by mouth 2 times daily  Qty: 120 tablet, Refills: 0      ondansetron (ZOFRAN) 4 MG tablet Take 1 tablet by mouth every 8 hours as needed for Nausea  Qty: 20 tablet, Refills: 0 Time Spent on discharge is 35 minutes in the examination, evaluation, counseling and review of medications and discharge plan. Signed:    Walter Velasco MD   6/4/2021      Thank you Xiomy Brandon MD for the opportunity to be involved in this patient's care. If you have any questions or concerns please feel free to contact me at [phone].

## 2021-06-04 NOTE — PROGRESS NOTES
Hospitalist Progress Note      PCP: Sade Anderson MD    Date of Admission: 5/31/2021    Chief Complaint:     No chief complaint on file. headache    Subjective:  Patient seen and examined at the bedside. No complaints at this time. No acute events overnight  HA is improved, she wants to go home today    PFHS: reviewed as documented 5/31/2021, no changes    Medications:  Reviewed    Infusion Medications    sodium chloride       Scheduled Medications    sodium chloride flush  5-40 mL Intravenous 2 times per day    enoxaparin  40 mg Subcutaneous Daily    nicotine  1 patch Transdermal Daily     PRN Meds: sodium chloride flush, sodium chloride, promethazine **OR** ondansetron, polyethylene glycol, promethazine, LORazepam, oxyCODONE **OR** oxyCODONE      Intake/Output Summary (Last 24 hours) at 6/4/2021 1224  Last data filed at 6/4/2021 0537  Gross per 24 hour   Intake 1240 ml   Output 500 ml   Net 740 ml       Physical Exam    /63   Pulse 81   Temp 97.6 °F (36.4 °C) (Oral)   Resp 18   Ht 4' 11\" (1.499 m)   Wt 157 lb 10.1 oz (71.5 kg)   LMP 10/31/2016 (Exact Date)   SpO2 94%   BMI 31.84 kg/m²     General appearance:  No acute distress, appears stated age  Eyes: Pupils equal, round, reactive to light, conjunctiva/corneas clear  Ears/Nose/Mouth/Throat: No external lesions or scars, hearing intact to voice  Neck: Trachea midline, no masses noted, no thyromegaly  Respiratory:  Non-labored breathing, clear to auscultation bilaterally  Cardiovascular: Regular rate and rhythm, no murmurs, gallops, or rubs  Abdomen: soft, non-tender, non-distended  Musculoskeletal: Warm, well perfused, no cyanosis or edema  Skin: normal color, no wounds noted  Psychiatric: A&Ox4, good insight and judgment    Labs:   No results for input(s): WBC, HGB, HCT, PLT in the last 72 hours. No results for input(s): NA, K, CL, CO2, BUN, CREATININE, CALCIUM, PHOS in the last 72 hours.     Invalid input(s): MAGNES  No results for input(s): AST, ALT, BILIDIR, BILITOT, ALKPHOS in the last 72 hours. No results for input(s): INR in the last 72 hours. No results for input(s): Khanh Journey in the last 72 hours. Urinalysis:      Lab Results   Component Value Date    NITRU Negative 07/09/2020    WBCUA 3 06/29/2020    BACTERIA 1+ 04/15/2020    RBCUA 1 06/29/2020    BLOODU Negative 07/09/2020    SPECGRAV >1.030 07/09/2020    GLUCOSEU Negative 07/09/2020    GLUCOSEU NEGATIVE 10/08/2011       Radiology:  MRI BRAIN W WO CONTRAST   Final Result   1. Left ventricular shunt tube with no abnormal enhancement or surrounding fluid collections. 2. Slitlike ventricles which remain midline with no intracranial edema identified.    3. Microangiopathic nonspecific changes in the centrum semiovale          Assessment/Plan:    Active Hospital Problems    Diagnosis Date Noted    Herpes zoster [B02.9] 06/02/2021    Intractable migraine with aura without status migrainosus [G43.119] 06/02/2021    Headache [R51.9] 05/31/2021    Tobacco smoker [Z72.0] 04/23/2018    Congenital hydrocephalus ([facility] [address] [Q03.9] 10/22/2015     (ventriculoperitoneal) shunt status [Z98.2]        Plan:    # Congenital hydrocephalus  #  shunt malfunction, ruled out  -neurosurgery consulted, shunt functioning, no evidence of infection  -will hold on antibiotics, cultures nothing yet  -neuro checks  -analgesia/antiemetics as needed, weaning     # PCOS  # Depression/anxiety     # Nicotine dependence  -advised to abstain  -nicotine patch    DVT Prophylaxis: Lovenox  Diet: DIET GENERAL;  Code Status: Full Code    PT/OT Eval Status: n/a, baseline    Dispo: likely dc today    Sonya Cheung MD

## 2021-06-04 NOTE — PROGRESS NOTES
Patient alert and oriented times 4 with stable VS.  Patient was given prescriptions and discharge instructions. Nurse educated patient on side effects and discharge instructions. Patient acknowledged an understanding of instructions and side effects. Patient IV removed catheter intact. Patient ambulated to private vehicle for discharge home.

## 2021-06-06 PROCEDURE — 96374 THER/PROPH/DIAG INJ IV PUSH: CPT

## 2021-06-06 PROCEDURE — 96375 TX/PRO/DX INJ NEW DRUG ADDON: CPT

## 2021-06-06 PROCEDURE — 99282 EMERGENCY DEPT VISIT SF MDM: CPT

## 2021-06-06 ASSESSMENT — PAIN SCALES - GENERAL: PAINLEVEL_OUTOF10: 8

## 2021-06-07 ENCOUNTER — APPOINTMENT (OUTPATIENT)
Dept: CT IMAGING | Age: 41
End: 2021-06-07
Payer: COMMERCIAL

## 2021-06-07 ENCOUNTER — APPOINTMENT (OUTPATIENT)
Dept: GENERAL RADIOLOGY | Age: 41
End: 2021-06-07
Payer: COMMERCIAL

## 2021-06-07 ENCOUNTER — HOSPITAL ENCOUNTER (EMERGENCY)
Age: 41
Discharge: HOME | End: 2021-06-07
Attending: EMERGENCY MEDICINE
Payer: COMMERCIAL

## 2021-06-07 VITALS
DIASTOLIC BLOOD PRESSURE: 84 MMHG | HEART RATE: 76 BPM | TEMPERATURE: 99 F | RESPIRATION RATE: 16 BRPM | OXYGEN SATURATION: 98 % | SYSTOLIC BLOOD PRESSURE: 123 MMHG | WEIGHT: 150 LBS | BODY MASS INDEX: 30.24 KG/M2 | HEIGHT: 59 IN

## 2021-06-07 VITALS
SYSTOLIC BLOOD PRESSURE: 158 MMHG | BODY MASS INDEX: 33.02 KG/M2 | HEIGHT: 59 IN | HEART RATE: 84 BPM | RESPIRATION RATE: 20 BRPM | DIASTOLIC BLOOD PRESSURE: 70 MMHG | OXYGEN SATURATION: 97 % | TEMPERATURE: 98.4 F | WEIGHT: 163.8 LBS

## 2021-06-07 DIAGNOSIS — H92.02 POSTERIOR AURICULAR PAIN OF LEFT EAR: Primary | ICD-10-CM

## 2021-06-07 DIAGNOSIS — Z98.2 S/P VP SHUNT: ICD-10-CM

## 2021-06-07 DIAGNOSIS — L03.221 CELLULITIS, NECK: Primary | ICD-10-CM

## 2021-06-07 DIAGNOSIS — Z98.2 VP (VENTRICULOPERITONEAL) SHUNT STATUS: ICD-10-CM

## 2021-06-07 LAB
ANION GAP SERPL CALCULATED.3IONS-SCNC: 13 MMOL/L (ref 3–16)
BASOPHILS ABSOLUTE: 0.1 K/UL (ref 0–0.2)
BASOPHILS ABSOLUTE: 0.1 K/UL (ref 0–0.2)
BASOPHILS RELATIVE PERCENT: 0.7 %
BASOPHILS RELATIVE PERCENT: 1.1 %
BILIRUBIN URINE: NEGATIVE
BLOOD, URINE: NEGATIVE
BUN BLDV-MCNC: 11 MG/DL (ref 7–20)
CALCIUM SERPL-MCNC: 9.7 MG/DL (ref 8.3–10.6)
CHLORIDE BLD-SCNC: 101 MMOL/L (ref 99–110)
CLARITY: CLEAR
CO2: 22 MMOL/L (ref 21–32)
COLOR: YELLOW
CREAT SERPL-MCNC: 0.7 MG/DL (ref 0.6–1.1)
EOSINOPHILS ABSOLUTE: 0.1 K/UL (ref 0–0.6)
EOSINOPHILS ABSOLUTE: 0.3 K/UL (ref 0–0.6)
EOSINOPHILS RELATIVE PERCENT: 0.6 %
EOSINOPHILS RELATIVE PERCENT: 2.2 %
GFR AFRICAN AMERICAN: >60
GFR NON-AFRICAN AMERICAN: >60
GLUCOSE BLD-MCNC: 141 MG/DL (ref 70–99)
GLUCOSE URINE: NEGATIVE MG/DL
HCT VFR BLD CALC: 41.3 % (ref 36–48)
HCT VFR BLD CALC: 42.4 % (ref 36–48)
HEMOGLOBIN: 14.3 G/DL (ref 12–16)
HEMOGLOBIN: 14.4 G/DL (ref 12–16)
KETONES, URINE: NEGATIVE MG/DL
LEUKOCYTE ESTERASE, URINE: NEGATIVE
LYMPHOCYTES ABSOLUTE: 2.3 K/UL (ref 1–5.1)
LYMPHOCYTES ABSOLUTE: 3.2 K/UL (ref 1–5.1)
LYMPHOCYTES RELATIVE PERCENT: 19.2 %
LYMPHOCYTES RELATIVE PERCENT: 25.3 %
MCH RBC QN AUTO: 28.6 PG (ref 26–34)
MCH RBC QN AUTO: 28.9 PG (ref 26–34)
MCHC RBC AUTO-ENTMCNC: 34 G/DL (ref 31–36)
MCHC RBC AUTO-ENTMCNC: 34.5 G/DL (ref 31–36)
MCV RBC AUTO: 83.6 FL (ref 80–100)
MCV RBC AUTO: 84.1 FL (ref 80–100)
MICROSCOPIC EXAMINATION: NORMAL
MONOCYTES ABSOLUTE: 0.5 K/UL (ref 0–1.3)
MONOCYTES ABSOLUTE: 0.7 K/UL (ref 0–1.3)
MONOCYTES RELATIVE PERCENT: 3.7 %
MONOCYTES RELATIVE PERCENT: 5.4 %
NEUTROPHILS ABSOLUTE: 8.3 K/UL (ref 1.7–7.7)
NEUTROPHILS ABSOLUTE: 9.2 K/UL (ref 1.7–7.7)
NEUTROPHILS RELATIVE PERCENT: 66 %
NEUTROPHILS RELATIVE PERCENT: 75.8 %
NITRITE, URINE: NEGATIVE
PDW BLD-RTO: 14.9 % (ref 12.4–15.4)
PDW BLD-RTO: 14.9 % (ref 12.4–15.4)
PH UA: 6 (ref 5–8)
PLATELET # BLD: 269 K/UL (ref 135–450)
PLATELET # BLD: 273 K/UL (ref 135–450)
PMV BLD AUTO: 7.8 FL (ref 5–10.5)
PMV BLD AUTO: 8.1 FL (ref 5–10.5)
POTASSIUM REFLEX MAGNESIUM: 4.6 MMOL/L (ref 3.5–5.1)
PRO-BNP: 9 PG/ML (ref 0–124)
PROTEIN UA: NEGATIVE MG/DL
RBC # BLD: 4.94 M/UL (ref 4–5.2)
RBC # BLD: 5.04 M/UL (ref 4–5.2)
REASON FOR REJECTION: NORMAL
REJECTED TEST: NORMAL
SODIUM BLD-SCNC: 136 MMOL/L (ref 136–145)
SPECIFIC GRAVITY UA: 1.01 (ref 1–1.03)
URINE TYPE: NORMAL
UROBILINOGEN, URINE: 0.2 E.U./DL
WBC # BLD: 12.1 K/UL (ref 4–11)
WBC # BLD: 12.6 K/UL (ref 4–11)

## 2021-06-07 PROCEDURE — 85025 COMPLETE CBC W/AUTO DIFF WBC: CPT

## 2021-06-07 PROCEDURE — 99284 EMERGENCY DEPT VISIT MOD MDM: CPT

## 2021-06-07 PROCEDURE — 80048 BASIC METABOLIC PNL TOTAL CA: CPT

## 2021-06-07 PROCEDURE — 96375 TX/PRO/DX INJ NEW DRUG ADDON: CPT

## 2021-06-07 PROCEDURE — 6360000002 HC RX W HCPCS: Performed by: PHYSICIAN ASSISTANT

## 2021-06-07 PROCEDURE — 96374 THER/PROPH/DIAG INJ IV PUSH: CPT

## 2021-06-07 PROCEDURE — 70450 CT HEAD/BRAIN W/O DYE: CPT

## 2021-06-07 PROCEDURE — 83880 ASSAY OF NATRIURETIC PEPTIDE: CPT

## 2021-06-07 PROCEDURE — 6370000000 HC RX 637 (ALT 250 FOR IP): Performed by: EMERGENCY MEDICINE

## 2021-06-07 PROCEDURE — 71045 X-RAY EXAM CHEST 1 VIEW: CPT

## 2021-06-07 PROCEDURE — 81003 URINALYSIS AUTO W/O SCOPE: CPT

## 2021-06-07 PROCEDURE — 6360000002 HC RX W HCPCS: Performed by: EMERGENCY MEDICINE

## 2021-06-07 PROCEDURE — 36415 COLL VENOUS BLD VENIPUNCTURE: CPT

## 2021-06-07 RX ORDER — CLINDAMYCIN HYDROCHLORIDE 300 MG/1
300 CAPSULE ORAL 3 TIMES DAILY
Qty: 30 CAPSULE | Refills: 0 | Status: SHIPPED | OUTPATIENT
Start: 2021-06-07 | End: 2021-06-17

## 2021-06-07 RX ORDER — ONDANSETRON 2 MG/ML
8 INJECTION INTRAMUSCULAR; INTRAVENOUS ONCE
Status: COMPLETED | OUTPATIENT
Start: 2021-06-07 | End: 2021-06-07

## 2021-06-07 RX ORDER — TRAMADOL HYDROCHLORIDE 50 MG/1
50 TABLET ORAL EVERY 6 HOURS PRN
Qty: 12 TABLET | Refills: 0 | Status: SHIPPED | OUTPATIENT
Start: 2021-06-07 | End: 2021-06-10

## 2021-06-07 RX ORDER — ONDANSETRON 2 MG/ML
4 INJECTION INTRAMUSCULAR; INTRAVENOUS ONCE
Status: COMPLETED | OUTPATIENT
Start: 2021-06-07 | End: 2021-06-07

## 2021-06-07 RX ORDER — CLINDAMYCIN HYDROCHLORIDE 150 MG/1
300 CAPSULE ORAL ONCE
Status: COMPLETED | OUTPATIENT
Start: 2021-06-07 | End: 2021-06-07

## 2021-06-07 RX ADMIN — ONDANSETRON 4 MG: 2 INJECTION INTRAMUSCULAR; INTRAVENOUS at 12:02

## 2021-06-07 RX ADMIN — HYDROMORPHONE HYDROCHLORIDE 1 MG: 1 INJECTION, SOLUTION INTRAMUSCULAR; INTRAVENOUS; SUBCUTANEOUS at 04:53

## 2021-06-07 RX ADMIN — HYDROMORPHONE HYDROCHLORIDE 0.5 MG: 1 INJECTION, SOLUTION INTRAMUSCULAR; INTRAVENOUS; SUBCUTANEOUS at 12:02

## 2021-06-07 RX ADMIN — CLINDAMYCIN HYDROCHLORIDE 300 MG: 150 CAPSULE ORAL at 06:32

## 2021-06-07 RX ADMIN — ONDANSETRON 8 MG: 2 INJECTION INTRAMUSCULAR; INTRAVENOUS at 04:53

## 2021-06-07 ASSESSMENT — PAIN SCALES - GENERAL
PAINLEVEL_OUTOF10: 8

## 2021-06-07 ASSESSMENT — PAIN DESCRIPTION - PAIN TYPE: TYPE: ACUTE PAIN

## 2021-06-07 ASSESSMENT — PAIN SCALES - WONG BAKER
WONGBAKER_NUMERICALRESPONSE: 8
WONGBAKER_NUMERICALRESPONSE: 8

## 2021-06-07 ASSESSMENT — PAIN DESCRIPTION - LOCATION: LOCATION: HEAD

## 2021-06-07 ASSESSMENT — PAIN DESCRIPTION - ORIENTATION: ORIENTATION: LEFT

## 2021-06-07 NOTE — PLAN OF CARE
Neurosurgery Plan of care     CAROLINE Keyes Counts  [id number]   1980 6/7/2021    Requesting physician: No admitting provider for patient encounter. Reason for consultation: Other (pain and pressure left side of head where shunt is) and Emesis    Subjective:  Ceci Capellan is a 47 y.o. female with past medical history significant for ADHD, congenital hydrocephalus with  shunt in place, nephrolithiasis, IBS, who presents with concern for shunt malfunction versus infection. Patient states that she had her shunt placed at [facility].  Patient states that she had an infection to her shunt in October 2020, was admitted inpatient at [facility] and received IV antibiotics at that time, specifically meropenem. States that she had multiple scans, but only way they were able to determine that she had an infection was to tap the shunt. States that her presentation at that time is similar to her presentation today. States that she had a cellulitis on the left side of her neck, which she has started developing over the past 1 to 2 days again. States that she was recently admitted for concern for shunt malfunction and had CT head and MRI at that time, most recently on 5/30 and 6/1, respectively, but feels her symptoms are significantly worsening. Also endorses upset stomach, nausea, vomiting, headache, blurred vision, and feeling generally unwell. Objective:  CT head wo contrast  5/30/2021  No acute intracranial abnormality. Ventricles appear somewhat slit-like, similar to prior. Low lying cerebellar tonsils. This is also unchanged       CT head wo contrast  6/7/2021  1. Stable left-sided ventriculostomy shunt catheter with no evidence of hardware failure. No hydrocephalus identified. Ventricular size is unchanged compared to the prior exam. 2. Stable low-lying cerebellar tonsils. 3. No acute intracranial abnormality identified.      Xray shunt series  5/30/2021  No evidence of shunt catheter discontinuity

## 2021-06-07 NOTE — ED PROVIDER NOTES
[address] [facility]      Pt Name: Bin Lester  MRN: [id number]  [place] 1980  Date of evaluation: 6/6/2021  Provider: Andi Sterling, [address] [place] [address]  Chief Complaint   Patient presents with    Allergic Reaction     every time the patient takes perocerts her eyes get itchy and the patient has redness and rash along her  shunt line       I wore personal protective equipment when I was in the room the entire time. This includes gloves, N95 mask, face shield, and a glove over my stethoscope for protection. HPI  Bin Lester is a 47 y.o. female who presents with pain on the left side of her neck from her  shunt. She states she was just discharged from the hospital for similar complaints. They thought it might be infected. However, they determined that it was not infected. However she presents again today with redness and irritation of the area where the  shunt is. She denies any fevers or chills. She denies any nausea vomiting. She denies any headaches. Touching the area makes the pain worse. She describes as moderate. .  She denies any radiation of her pain. ? REVIEW OF SYSTEMS  All systems negative except as noted in the HPI. Reviewed Nurses' notes and concur. Patient's last menstrual period was 10/31/2016 (exact date). PAST MEDICAL HISTORY  Past Medical History:   Diagnosis Date    ADHD (attention deficit hyperactivity disorder) 82    Anesthesia complication     pt states with general anesthesia, she gets very nauseated and wakes up with a migrain     Depression with anxiety     Difficult intubation     Encounter for imaging to screen for metal prior to MRI 06/01/2021    MRI Conditional Medtronic Non-Programmable shunt model#[id number] implanted 10/30/2020 at [facility]. Normal Mode.  1.5T or 3.0T.    ESBL (extended spectrum beta-lactamase) producing bacteria infection 11/06/2019    urine    Functional ovarian cysts 2008    rt ovary cyst x 2 yrs.  Headache(784.0)     migraines    Hiatal hernia     History of blood transfusion     History of kidney stones     History of PCOS     Hydrocephalus ([address]     Irritable bowel syndrome 2004    had colonoscopy about 6 yrs ago    Kidney stone     Meningitis     Neutrophilic leukocytosis     Nicotine dependence     Primary osteoarthritis of left knee 2016    S/P cone biopsy of cervix 2004    Scoliosis .s     (ventriculoperitoneal) shunt status     hydrocephalus f/w [facility]surgeon [facility]    Wears glasses     reading       FAMILY HISTORY  Family History   Problem Relation Age of Onset    High Blood Pressure Mother     Diabetes Mother     High Cholesterol Mother     Depression Mother     Diabetes Maternal Grandmother     High Blood Pressure Maternal Grandmother     High Cholesterol Maternal Grandmother     Heart Disease Maternal Grandfather     High Blood Pressure Maternal Grandfather     Heart Disease Paternal Grandmother     Stroke Paternal Grandfather     Depression Sister     Cirrhosis Father     Rheum Arthritis Neg Hx     Osteoarthritis Neg Hx     Asthma Neg Hx     Breast Cancer Neg Hx     Cancer Neg Hx     Heart Failure Neg Hx     Hypertension Neg Hx     Migraines Neg Hx     Ovarian Cancer Neg Hx     Rashes/Skin Problems Neg Hx     Seizures Neg Hx     Thyroid Disease Neg Hx        SOCIAL HISTORY   reports that she has been smoking cigarettes. She has a 9.00 pack-year smoking history. She has never used smokeless tobacco. She reports that she does not drink alcohol and does not use drugs.     SURGICAL HISTORY  Past Surgical History:   Procedure Laterality Date    APPENDECTOMY  2003    appendicitis     SECTION  2005    placenta previa    CHOLECYSTECTOMY      COLONOSCOPY  2004    COLPOSCOPY  2004    CSF SHUNT      replaced at age 17    CYSTOSCOPY      stone removal   [facility] inguinal    HYSTERECTOMY, TOTAL ABDOMINAL  11/09/2016    TAHBSO, adhesions/PCOS    KNEE ARTHROSCOPY Left 1/7/2015    medial meniscectomy, chondroplasty, plica resection    LITHOTRIPSY Bilateral 12/10/2019    OTHER SURGICAL HISTORY  10/19/2016    op lap    VENTRICULOPERITONEAL SHUNT      multiple revisions, most recent 2015       CURRENT MEDICATIONS  Current Outpatient Rx   Medication Sig Dispense Refill    clindamycin (CLEOCIN) 300 MG capsule Take 1 capsule by mouth 3 times daily for 10 days 30 capsule 0    promethazine (PHENERGAN) 12.5 MG tablet Take 1 tablet by mouth every 6 hours as needed for Nausea 30 tablet 0    oxyCODONE (ROXICODONE) 5 MG immediate release tablet Take 1 tablet by mouth every 6 hours as needed for Pain for up to 5 days. Intended supply: 5 days. Take lowest dose possible to manage pain 20 tablet 0    benzonatate (TESSALON) 100 MG capsule Take 100 mg by mouth 3 times daily as needed for Cough      famotidine (PEPCID) 10 MG tablet Take 2 tablets by mouth 2 times daily 120 tablet 0    ondansetron (ZOFRAN) 4 MG tablet Take 1 tablet by mouth every 8 hours as needed for Nausea 20 tablet 0       ALLERGIES  Allergies   Allergen Reactions    Bentyl [Dicyclomine Hcl] Shortness Of Breath and Anxiety    Mushroom Extract Complex Anaphylaxis     Can't eat mushrooms.      Sulfa Antibiotics Shortness Of Breath    Vancomycin Anaphylaxis and Hives    Acetaminophen     Adhesive Tape     Bee Venom Swelling    Levaquin [Levofloxacin]     Toradol [Ketorolac Tromethamine] Hives and Itching     Tolerates PO NSAIDs fine    Ceftaroline Rash    Daptomycin Swelling and Rash    Dicyclomine Anxiety    Fioricet-Codeine [Butalbital-Apap-Caff-Cod] Anxiety    Oxycodone-Acetaminophen Nausea And Vomiting     Tolerates Norco/Vicodin ok    Zosyn [Piperacillin Sod-Tazobactam So] Hives     Also causes nausea, SOB, dizziness       Tetanus vaccination status reviewed: tetanus re-vaccination not indicated. PHYSICAL EXAM  VITAL SIGNS: BP (!) 143/94   Pulse 97   Temp 98.6 °F (37 °C) (Oral)   Resp 18   Ht 4' 11\" (1.499 m)   Wt 163 lb 12.8 oz (74.3 kg)   LMP 10/31/2016 (Exact Date)   SpO2 98%   BMI 33.08 kg/m²   Constitutional: Well-developed, well-nourished, appears normal, nontoxic, activity: Resting comfortably on the cart, no obvious pain until her neck is palpated. HENT: Normocephalic, Atraumatic, Bilateral external ears normal, TM's were normal, Mucus membranes are moist and oropharynx is patent and clear, No oral exudates, Nose normal.  Eyes:  Conjunctiva normal, No discharge. No scleral icterus. Neck: Normal range of motion, No tenderness, Supple, there is mild erythema along the tracking of the  shunt. There is moderate tenderness noted to this area. There is no abscess or pustules or vesicles noted. .  Lymphatic: No lymphadenopathy noted. Cardiovascular: Normal heart rate, Normal rhythm, no murmurs, no gallops, no rubs. Thorax & Lungs: Normal breath sounds, no respiratory distress, no wheezing, no rales, no rhonchi  Abdomen: Soft, Nontender, No hepatosplenomegaly, No masses, No pulsatile masses, No distension, normal bowel sounds  Skin: Warm, Dry, No erythema, No rash. Extremities: No edema, No tenderness, No cyanosis, No clubbing. No amputations, capillary refill less than 2 seconds. Musculoskeletal:  No tenderness to palpation, no major deformities noted.   Neurologic: Alert & oriented x 3  Psychiatric: Affect normal, Mood normal.    ?  LABORATORY  Labs Reviewed   CBC WITH AUTO DIFFERENTIAL - Abnormal; Notable for the following components:       Result Value    WBC 12.6 (*)     Neutrophils Absolute 8.3 (*)     All other components within normal limits    Narrative:     Performed at:                  [address] [address]   Phone [phone]   BRAIN NATRIURETIC PEPTIDE    Narrative:     Performed adjusted. However, this could also be a sign that they will need to have their blood pressure treated or medications changed. The patient was instructed to follow up closely with their personal physician to have their blood pressure rechecked. The patient was instructed to take a list of recent blood pressure readings to their next visit with their personal physician. See discharge instructions for specific medications, discharge information, and treatments. They were verbally instructed to return to emergency if any problems. (This chart has been completed using [address]. Although attempts have been made to ensure accuracy, words and/or phrases may not be transcribed as intended.)    Patient requested pain medicines at the time of their exam.    IMPRESSION(S):  1. Cellulitis, neck        ?   Recheck Times: [address], [id number], [id number]    Diagnostic considerations include but are not limited to:  Necrotizing fasciitis, cellulitis, erysipelas, suppurative thrombophlebitis, aseptic superficial thrombophlebitis, DVT, gout, compartment syndrome, erythema migrans (lyme disease), contact dermatitis, lymphedema, other         Euel Breath, DO  06/07/21 [id number]

## 2021-06-07 NOTE — ED PROVIDER NOTES
[address] ENCOUNTER          PHYSICIAN ASSISTANT NOTE       Date of evaluation: 6/7/2021    Chief Complaint     Other (pain and pressure left side of head where shunt is) and Emesis      History of Present Illness     Jannette Day is a 47 y.o. female with past medical history significant for ADHD, congenital hydrocephalus with  shunt in place, nephrolithiasis, IBS, who presents with concern for shunt malfunction versus infection. Patient states that she had her shunt placed at [facility].  Patient states that she had an infection to her shunt in October 2020, was admitted inpatient at [facility] and received IV antibiotics at that time, specifically meropenem. States that she had multiple scans, but only way they were able to determine that she had an infection was to tap the shunt. States that her presentation at that time is similar to her presentation today. States that she had a cellulitis on the left side of her neck, which she has started developing over the past 1 to 2 days again. States that she was recently admitted for concern for shunt malfunction and had CT head and MRI at that time, most recently on 5/30 and 6/1, respectively, but feels her symptoms are significantly worsening. Also endorses upset stomach, nausea, vomiting, headache, blurred vision, and feeling generally unwell. Review of Systems     Review of Systems   See HPI, all others negative.     Past Medical, Surgical, Family, and Social History     She has a past medical history of ADHD (attention deficit hyperactivity disorder), Anesthesia complication, Depression with anxiety, Difficult intubation, Encounter for imaging to screen for metal prior to MRI, ESBL (extended spectrum beta-lactamase) producing bacteria infection, Functional ovarian cysts, Headache(784.0), Hiatal hernia, History of blood transfusion, History of kidney stones, History of PCOS, Hydrocephalus ([facility] [address] Irritable bowel syndrome, Kidney stone, Meningitis, Neutrophilic leukocytosis, Nicotine dependence, Primary osteoarthritis of left knee, S/P cone biopsy of cervix, Scoliosis,  (ventriculoperitoneal) shunt status, and Wears glasses. She has a past surgical history that includes Appendectomy (); hernia repair (); Colonoscopy (); Colposcopy ();  section (); csf shunt; Cystoscopy; Knee arthroscopy (Left, 2015); Cholecystectomy; other surgical history (10/19/2016); Ventriculoperitoneal shunt; Hysterectomy, total abdominal (2016); and Lithotripsy (Bilateral, 12/10/2019). Her family history includes Cirrhosis in her father; Depression in her mother and sister; Diabetes in her maternal grandmother and mother; Heart Disease in her maternal grandfather and paternal grandmother; High Blood Pressure in her maternal grandfather, maternal grandmother, and mother; High Cholesterol in her maternal grandmother and mother; Stroke in her paternal grandfather. She reports that she has been smoking cigarettes. She has a 9.00 pack-year smoking history. She has never used smokeless tobacco. She reports that she does not drink alcohol and does not use drugs. Medications     Discharge Medication List as of 2021  4:05 PM      CONTINUE these medications which have NOT CHANGED    Details   clindamycin (CLEOCIN) 300 MG capsule Take 1 capsule by mouth 3 times daily for 10 days, Disp-30 capsule, R-0Print      promethazine (PHENERGAN) 12.5 MG tablet Take 1 tablet by mouth every 6 hours as needed for Nausea, Disp-30 tablet, R-0Print      oxyCODONE (ROXICODONE) 5 MG immediate release tablet Take 1 tablet by mouth every 6 hours as needed for Pain for up to 5 days. Intended supply: 5 days.  Take lowest dose possible to manage pain, Disp-20 tablet, R-0Print      benzonatate (TESSALON) 100 MG capsule Take 100 mg by mouth 3 times daily as needed for CoughHistorical Med      famotidine (PEPCID) 10 MG tablet Take 2 tablets by mouth 2 times daily, Disp-120 tablet, R-0Print      ondansetron (ZOFRAN) 4 MG tablet Take 1 tablet by mouth every 8 hours as needed for Nausea, Disp-20 tablet, R-0Normal             Allergies     She is allergic to bentyl [dicyclomine hcl], mushroom extract complex, sulfa antibiotics, vancomycin, acetaminophen, adhesive tape, bee venom, levaquin [levofloxacin], toradol [ketorolac tromethamine], ceftaroline, daptomycin, dicyclomine, fioricet-codeine [butalbital-apap-caff-cod], oxycodone-acetaminophen, and zosyn [piperacillin sod-tazobactam so]. Physical Exam     INITIAL VITALS: BP: (!) 140/92, Temp: 99 °F (37.2 °C), Pulse: 97, Resp: 16, SpO2: 100 %  Physical Exam  Constitutional:       General: She is not in acute distress. Appearance: Normal appearance. She is not ill-appearing, toxic-appearing or diaphoretic. Comments: Appears uncomfortable. HENT:      Head: Normocephalic and atraumatic. Nose: Nose normal.      Mouth/Throat:      Mouth: Mucous membranes are moist.      Pharynx: Oropharynx is clear. Eyes:      Extraocular Movements: Extraocular movements intact. Conjunctiva/sclera: Conjunctivae normal.      Pupils: Pupils are equal, round, and reactive to light. Neck:      Comments: Erythema noted to L lateral aspect of neck/post-auricular region. Cardiovascular:      Rate and Rhythm: Normal rate. Pulmonary:      Effort: Pulmonary effort is normal.   Musculoskeletal:         General: No swelling or tenderness. Normal range of motion. Cervical back: Normal range of motion. Skin:     General: Skin is warm and dry. Neurological:      Mental Status: She is alert. Cranial Nerves: No cranial nerve deficit. Sensory: No sensory deficit. Motor: No weakness.       Coordination: Coordination normal.      Comments: Tearful throughout conversation          Diagnostic Results     RADIOLOGY:  XR SHUNT SERIES PLACEMENT (<4 VIEWS)   Final Result      6 views demonstrate no evidence of shunt fracture or discontinuity. There is some redundant looping of the shunt in the right midabdomen, with tip in the left pelvis. CT Head WO Contrast   Final Result      1. Stable left-sided ventriculostomy shunt catheter with no evidence of hardware failure. No hydrocephalus identified. Ventricular size is unchanged compared to the prior exam.   2. Stable low-lying cerebellar tonsils. 3. No acute intracranial abnormality identified.           LABS:   Results for orders placed or performed during the hospital encounter of 89/41/61   Basic Metabolic Panel w/ Reflex to MG   Result Value Ref Range    Sodium 136 136 - 145 mmol/L    Potassium reflex Magnesium 4.6 3.5 - 5.1 mmol/L    Chloride 101 99 - 110 mmol/L    CO2 22 21 - 32 mmol/L    Anion Gap 13 3 - 16    Glucose 141 (H) 70 - 99 mg/dL    BUN 11 7 - 20 mg/dL    CREATININE 0.7 0.6 - 1.1 mg/dL    GFR Non-African American >60 >60    GFR African American >60 >60    Calcium 9.7 8.3 - 10.6 mg/dL   Urinalysis, reflex to microscopic   Result Value Ref Range    Color, UA Yellow Straw/Yellow    Clarity, UA Clear Clear    Glucose, Ur Negative Negative mg/dL    Bilirubin Urine Negative Negative    Ketones, Urine Negative Negative mg/dL    Specific Gravity, UA 1.010 1.005 - 1.030    Blood, Urine Negative Negative    pH, UA 6.0 5.0 - 8.0    Protein, UA Negative Negative mg/dL    Urobilinogen, Urine 0.2 <2.0 E.U./dL    Nitrite, Urine Negative Negative    Leukocyte Esterase, Urine Negative Negative    Microscopic Examination Not Indicated     Urine Type Voided    SPECIMEN REJECTION   Result Value Ref Range    Rejected Test CBCWD     Reason for Rejection see below    CBC Auto Differential   Result Value Ref Range    WBC 12.1 (H) 4.0 - 11.0 K/uL    RBC 5.04 4.00 - 5.20 M/uL    Hemoglobin 14.4 12.0 - 16.0 g/dL    Hematocrit 42.4 36.0 - 48.0 %    MCV 84.1 80.0 - 100.0 fL    MCH 28.6 26.0 - 34.0 pg    MCHC 34.0 31.0 - 36.0 g/dL    RDW 14.9 12.4 - 15.4 %    Platelets 273 135 - 450 K/uL    MPV 8.1 5.0 - 10.5 fL    Neutrophils % 75.8 %    Lymphocytes % 19.2 %    Monocytes % 3.7 %    Eosinophils % 0.6 %    Basophils % 0.7 %    Neutrophils Absolute 9.2 (H) 1.7 - 7.7 K/uL    Lymphocytes Absolute 2.3 1.0 - 5.1 K/uL    Monocytes Absolute 0.5 0.0 - 1.3 K/uL    Eosinophils Absolute 0.1 0.0 - 0.6 K/uL    Basophils Absolute 0.1 0.0 - 0.2 K/uL       RECENT VITALS:  BP: 123/84, Temp: 99 °F (37.2 °C), Pulse: 76, Resp: 16, SpO2: 98 %     Procedures       ED Course     Nursing Notes, Past Medical Hx,Past Surgical Hx, Social Hx, Allergies, and Family Hx were reviewed. The patient was given the following medications:  Orders Placed This Encounter   Medications    HYDROmorphone (DILAUDID) injection 0.5 mg    ondansetron (ZOFRAN) injection 4 mg    traMADol (ULTRAM) 50 MG tablet     Sig: Take 1 tablet by mouth every 6 hours as needed for Pain for up to 3 days. Intended supply: 3 days. Take lowest dose possible to manage pain     Dispense:  12 tablet     Refill:  0       CONSULTS:  Pascale Moctezuma / VERONIKA / Loy Jad is a 47 y.o. female presenting with concern for shunt malfunction versus infection. Patient does appear slightly anxious, but has normal stable vital signs. Patient was given 1 dose of pain medication in the emergency department. Given patient's reported history, blood work was obtained including CBC and BMP. CBC shows improved leukocytosis from earlier today of 12.6 to 12.1 now. BMP resulted within normal limits. Given patient's complaints and concern for shunt malfunction, CT head and x-ray shunt series was ordered and showed:  XR SHUNT SERIES PLACEMENT (<4 VIEWS)   Final Result      6 views demonstrate no evidence of shunt fracture or discontinuity. There is some redundant looping of the shunt in the right midabdomen, with tip in the left pelvis.       CT Head WO Contrast   Final Result 1. Stable left-sided ventriculostomy shunt catheter with no evidence of hardware failure. No hydrocephalus identified. Ventricular size is unchanged compared to the prior exam.   2. Stable low-lying cerebellar tonsils. 3. No acute intracranial abnormality identified. Discussed all findings with patient. Spoke with neurosurgery team who stated that they did not feel any further work-up was warranted. States that patient was just admitted within the past couple of weeks and had an extensive work-up. States that at hospital patient was never found to have an infection, but was treated empirically prior to CSF cultures resulting. Neurosurgery team states that they feel symptoms are likely related to an allergic reaction to the silicone from the shunt. Neurosurgery team recommended patient follow-up with an allergist, but patient has not done so yet. Neurosurgery team stated there is nothing further for them to do at this time and that typically her leukocytosis and elevated temperature are secondary to an other cause including UTI. UA shows no signs of UTI here today. Neurosurgery team again recommends follow-up with allergist. Patient was given strict return precautions regarding any worsening symptoms. Patient requested narcotic pain medication script, but discussed that this will not be provided today upon discharge. Patient verbalized understanding and was given a referral to pain management. This patient was also evaluated by the attending physician. All care plans were discussed and agreed upon. Clinical Impression     1. Posterior auricular pain of left ear    2. S/P  shunt    3.   (ventriculoperitoneal) shunt status        Disposition     PATIENT REFERRED TO:  [facility] Emergency Department  [address] [place] [address]  [address]  [phone]    As needed, If symptoms worsen    [facility] Emergency Department  [address]  [address] [address]  [phone]          DISCHARGE MEDICATIONS:  Discharge Medication List as of 6/7/2021  4:05 PM      START taking these medications    Details   traMADol (ULTRAM) 50 MG tablet Take 1 tablet by mouth every 6 hours as needed for Pain for up to 3 days. Intended supply: 3 days.  Take lowest dose possible to manage pain, Disp-12 tablet, R-0Print             DISPOSITION         Melissa Carey PA-C  06/09/21 1429

## 2021-06-17 ENCOUNTER — OFFICE VISIT (OUTPATIENT)
Dept: PRIMARY CARE CLINIC | Age: 41
End: 2021-06-17
Payer: COMMERCIAL

## 2021-06-17 VITALS
WEIGHT: 163.2 LBS | BODY MASS INDEX: 32.96 KG/M2 | HEART RATE: 105 BPM | SYSTOLIC BLOOD PRESSURE: 118 MMHG | DIASTOLIC BLOOD PRESSURE: 70 MMHG | OXYGEN SATURATION: 98 %

## 2021-06-17 DIAGNOSIS — B35.1 NAIL FUNGAL INFECTION: ICD-10-CM

## 2021-06-17 DIAGNOSIS — Z11.4 SCREENING FOR HIV WITHOUT PRESENCE OF RISK FACTORS: ICD-10-CM

## 2021-06-17 DIAGNOSIS — Q03.9 CONGENITAL HYDROCEPHALUS (HCC): Chronic | ICD-10-CM

## 2021-06-17 DIAGNOSIS — R11.2 NAUSEA AND VOMITING, INTRACTABILITY OF VOMITING NOT SPECIFIED, UNSPECIFIED VOMITING TYPE: ICD-10-CM

## 2021-06-17 DIAGNOSIS — G89.29 CHRONIC NONINTRACTABLE HEADACHE, UNSPECIFIED HEADACHE TYPE: Chronic | ICD-10-CM

## 2021-06-17 DIAGNOSIS — R61 EXCESSIVE SWEATING: ICD-10-CM

## 2021-06-17 DIAGNOSIS — R10.30 LOWER ABDOMINAL PAIN: Primary | ICD-10-CM

## 2021-06-17 DIAGNOSIS — R19.09 ABDOMINAL MASS OF OTHER SITE: ICD-10-CM

## 2021-06-17 DIAGNOSIS — R51.9 CHRONIC NONINTRACTABLE HEADACHE, UNSPECIFIED HEADACHE TYPE: Chronic | ICD-10-CM

## 2021-06-17 DIAGNOSIS — Z13.1 SCREENING FOR DIABETES MELLITUS: ICD-10-CM

## 2021-06-17 DIAGNOSIS — Z98.2 VP (VENTRICULOPERITONEAL) SHUNT STATUS: Chronic | ICD-10-CM

## 2021-06-17 DIAGNOSIS — Z13.220 SCREENING FOR LIPID DISORDERS: ICD-10-CM

## 2021-06-17 PROBLEM — Z78.9 FAILURE OF OUTPATIENT TREATMENT: Status: RESOLVED | Noted: 2019-11-07 | Resolved: 2021-06-17

## 2021-06-17 PROBLEM — T85.618A SHUNT MALFUNCTION: Status: ACTIVE | Noted: 2020-10-21

## 2021-06-17 PROCEDURE — 99214 OFFICE O/P EST MOD 30 MIN: CPT | Performed by: NURSE PRACTITIONER

## 2021-06-17 PROCEDURE — 4004F PT TOBACCO SCREEN RCVD TLK: CPT | Performed by: NURSE PRACTITIONER

## 2021-06-17 PROCEDURE — G8427 DOCREV CUR MEDS BY ELIG CLIN: HCPCS | Performed by: NURSE PRACTITIONER

## 2021-06-17 PROCEDURE — G8417 CALC BMI ABV UP PARAM F/U: HCPCS | Performed by: NURSE PRACTITIONER

## 2021-06-17 PROCEDURE — 1111F DSCHRG MED/CURRENT MED MERGE: CPT | Performed by: NURSE PRACTITIONER

## 2021-06-17 RX ORDER — SUMATRIPTAN 100 MG/1
TABLET, FILM COATED ORAL
COMMUNITY
Start: 2021-06-09 | End: 2021-06-17 | Stop reason: SDUPTHER

## 2021-06-17 RX ORDER — SUMATRIPTAN 100 MG/1
100 TABLET, FILM COATED ORAL
Qty: 9 TABLET | Refills: 0 | Status: SHIPPED | OUTPATIENT
Start: 2021-06-17 | End: 2021-09-06

## 2021-06-17 RX ORDER — PROMETHAZINE HYDROCHLORIDE 25 MG/1
25 TABLET ORAL EVERY 8 HOURS PRN
Qty: 90 TABLET | Refills: 0 | Status: SHIPPED | OUTPATIENT
Start: 2021-06-17 | End: 2021-07-02 | Stop reason: ALTCHOICE

## 2021-06-17 RX ORDER — TRAMADOL HYDROCHLORIDE 50 MG/1
TABLET ORAL
COMMUNITY
Start: 2021-05-13 | End: 2021-07-13 | Stop reason: ALTCHOICE

## 2021-06-17 RX ORDER — PROMETHAZINE HYDROCHLORIDE 25 MG/1
25 TABLET ORAL EVERY 8 HOURS PRN
COMMUNITY
Start: 2021-06-09 | End: 2021-06-17 | Stop reason: SDUPTHER

## 2021-06-17 RX ORDER — TERBINAFINE HYDROCHLORIDE 250 MG/1
250 TABLET ORAL DAILY
Qty: 42 TABLET | Refills: 0 | Status: SHIPPED | OUTPATIENT
Start: 2021-06-17 | End: 2021-08-09 | Stop reason: SDUPTHER

## 2021-06-17 NOTE — PATIENT INSTRUCTIONS
Call Central Scheduling to schedule your ultrasound. [phone], press #2, then #1. [address] [facility]   [address]  Open 7 am-5 pm Mon.-Fri. [address]  [address]  [facility]  Open 7 am - 5 pm Mon-Fri, and Sat 8 am  [phone]

## 2021-06-17 NOTE — PROGRESS NOTES
Gay Alaniz (:  1980) is a 47 y.o. female,Established patient, here for evaluation of the following chief complaint(s):  Establish Care    Pt just moved back from [place], needing to re-establish care.  shunt: Pt has had  shunt since birth. Most recent surgery to fix it was back in [facility]. She reports she had the magnetized  shunt to check pressures but it was malfunctioning and she did not want that again, so in [place] they placed one that they can not check the pressures. She is having pain and swelling behind her left year where the shunt is. Also headaches and head pain. She states it is not her migraines, it is her shunt pain. She did a VV with her neurologist in [place] and they toldher to go to ER. She went to ER at [facility] and they stated there was nothing they could do. She was admitted at [facility] at end of May for her shunt, they wanted her to follow up with Neurosurgery at [facility] but she has not done that yet as they stated she needed a referral from me. Getting reestablished with her psychiatrist at [address] [facility] to get back on her Vyvanse and ativan. Nail infection: Reprots she had her nails done at a nail place recently and since they they have been cracking, bleeding and painful. ASSESSMENT/PLAN:  1. Lower abdominal pain  -     US ABDOMEN COMPLETE; Future  -     Comprehensive Metabolic Panel; Future  -     CBC Auto Differential; Future  2. Abdominal mass of other site  -     US ABDOMEN COMPLETE; Future  -     Comprehensive Metabolic Panel; Future  -     CBC Auto Differential; Future  3. Congenital hydrocephalus ([facility] [address]  -     Comprehensive Metabolic Panel; Future  -     CBC Auto Differential; Future  -     AFL - Kevin Lakhani MD, Neurosurgery, [facility]  4.  (ventriculoperitoneal) shunt status  -     JENNIFER Lakhani MD, Neurosurgery, [facility]  5.  Nausea and vomiting, intractability of vomiting not specified, unspecified vomiting type  - promethazine (PHENERGAN) 25 MG tablet; Take 1 tablet by mouth every 8 hours as needed for Nausea, Disp-90 tablet, R-0Normal  -     Comprehensive Metabolic Panel; Future  -     CBC Auto Differential; Future  6. Chronic nonintractable headache, unspecified headache type  -     SUMAtriptan (IMITREX) 100 MG tablet; Take 1 tablet by mouth once as needed for Migraine, Disp-9 tablet, R-0Normal  -     Vitamin D 25 Hydroxy; Future  7. Nail fungal infection  -     terbinafine (LAMISIL) 250 MG tablet; Take 1 tablet by mouth daily, Disp-42 tablet, R-0Normal  -     Comprehensive Metabolic Panel; Future  8. Screening for lipid disorders  -     Lipid Panel; Future  9. Screening for diabetes mellitus  -     Hemoglobin A1C; Future  10. Screening for HIV without presence of risk factors  -     HIV Screen; Future  11. Excessive sweating  -     TSH with Reflex; Future  -     Comprehensive Metabolic Panel; Future  -     CBC Auto Differential; Future      Return in about 3 months (around 9/17/2021) for Annual Physical.     -Pt to go to lab to have blood drawn, Venipuncture performed in office to obtain labs. -Rx and referrals as above    Subjective   SUBJECTIVE/OBJECTIVE:  HPI    Review of Systems   Constitutional: Positive for diaphoresis. Negative for activity change, appetite change, chills, fatigue and fever. Respiratory: Negative for cough, shortness of breath and wheezing. Cardiovascular: Negative for chest pain, palpitations and leg swelling. Gastrointestinal: Positive for abdominal pain. Negative for constipation, diarrhea, nausea and vomiting. Neurological: Positive for headaches. Negative for dizziness, weakness, light-headedness and numbness. Psychiatric/Behavioral: Negative for agitation, behavioral problems, confusion, decreased concentration, dysphoric mood, hallucinations, self-injury, sleep disturbance and suicidal ideas. The patient is nervous/anxious. The patient is not hyperactive.            Objective

## 2021-06-21 ASSESSMENT — ENCOUNTER SYMPTOMS
COUGH: 0
VOMITING: 0
SHORTNESS OF BREATH: 0
ABDOMINAL PAIN: 1
NAUSEA: 0
DIARRHEA: 0
WHEEZING: 0
CONSTIPATION: 0

## 2021-06-28 ENCOUNTER — HOSPITAL ENCOUNTER (OUTPATIENT)
Dept: ULTRASOUND IMAGING | Age: 41
Discharge: HOME | End: 2021-06-28
Payer: COMMERCIAL

## 2021-06-28 DIAGNOSIS — R10.30 LOWER ABDOMINAL PAIN: ICD-10-CM

## 2021-06-28 DIAGNOSIS — R19.09 ABDOMINAL MASS OF OTHER SITE: ICD-10-CM

## 2021-06-28 PROCEDURE — 76700 US EXAM ABDOM COMPLETE: CPT

## 2021-07-02 ENCOUNTER — HOSPITAL ENCOUNTER (EMERGENCY)
Age: 41
Discharge: HOME | End: 2021-07-02
Attending: EMERGENCY MEDICINE
Payer: COMMERCIAL

## 2021-07-02 ENCOUNTER — TELEPHONE (OUTPATIENT)
Dept: PRIMARY CARE CLINIC | Age: 41
End: 2021-07-02

## 2021-07-02 ENCOUNTER — APPOINTMENT (OUTPATIENT)
Dept: CT IMAGING | Age: 41
End: 2021-07-02
Payer: COMMERCIAL

## 2021-07-02 VITALS
OXYGEN SATURATION: 98 % | DIASTOLIC BLOOD PRESSURE: 72 MMHG | HEART RATE: 69 BPM | RESPIRATION RATE: 16 BRPM | TEMPERATURE: 98.7 F | SYSTOLIC BLOOD PRESSURE: 119 MMHG

## 2021-07-02 DIAGNOSIS — R11.2 NON-INTRACTABLE VOMITING WITH NAUSEA, UNSPECIFIED VOMITING TYPE: Primary | ICD-10-CM

## 2021-07-02 DIAGNOSIS — R10.33 PERIUMBILICAL ABDOMINAL PAIN: ICD-10-CM

## 2021-07-02 LAB
A/G RATIO: 1.5 (ref 1.1–2.2)
ALBUMIN SERPL-MCNC: 4.5 G/DL (ref 3.4–5)
ALP BLD-CCNC: 219 U/L (ref 40–129)
ALT SERPL-CCNC: 140 U/L (ref 10–40)
ANION GAP SERPL CALCULATED.3IONS-SCNC: 12 MMOL/L (ref 3–16)
AST SERPL-CCNC: 197 U/L (ref 15–37)
BACTERIA: ABNORMAL /HPF
BASOPHILS ABSOLUTE: 0 K/UL (ref 0–0.2)
BASOPHILS RELATIVE PERCENT: 0.2 %
BILIRUB SERPL-MCNC: 0.7 MG/DL (ref 0–1)
BILIRUBIN URINE: NEGATIVE
BLOOD, URINE: NEGATIVE
BUN BLDV-MCNC: 13 MG/DL (ref 7–20)
CALCIUM SERPL-MCNC: 9.5 MG/DL (ref 8.3–10.6)
CHLORIDE BLD-SCNC: 100 MMOL/L (ref 99–110)
CLARITY: ABNORMAL
CO2: 27 MMOL/L (ref 21–32)
COLOR: YELLOW
CREAT SERPL-MCNC: 0.7 MG/DL (ref 0.6–1.1)
EOSINOPHILS ABSOLUTE: 0.2 K/UL (ref 0–0.6)
EOSINOPHILS RELATIVE PERCENT: 1.8 %
EPITHELIAL CELLS, UA: 13 /HPF (ref 0–5)
GFR AFRICAN AMERICAN: >60
GFR NON-AFRICAN AMERICAN: >60
GLOBULIN: 3.1 G/DL
GLUCOSE BLD-MCNC: 100 MG/DL (ref 70–99)
GLUCOSE URINE: NEGATIVE MG/DL
HCG QUALITATIVE: NEGATIVE
HCT VFR BLD CALC: 40.8 % (ref 36–48)
HEMOGLOBIN: 14.3 G/DL (ref 12–16)
HYALINE CASTS: 0 /LPF (ref 0–8)
KETONES, URINE: NEGATIVE MG/DL
LACTIC ACID: 0.8 MMOL/L (ref 0.4–2)
LEUKOCYTE ESTERASE, URINE: NEGATIVE
LIPASE: 13 U/L (ref 13–60)
LYMPHOCYTES ABSOLUTE: 3 K/UL (ref 1–5.1)
LYMPHOCYTES RELATIVE PERCENT: 34.4 %
MCH RBC QN AUTO: 29.4 PG (ref 26–34)
MCHC RBC AUTO-ENTMCNC: 35.1 G/DL (ref 31–36)
MCV RBC AUTO: 83.7 FL (ref 80–100)
MICROSCOPIC EXAMINATION: YES
MONOCYTES ABSOLUTE: 0.6 K/UL (ref 0–1.3)
MONOCYTES RELATIVE PERCENT: 6.6 %
NEUTROPHILS ABSOLUTE: 4.9 K/UL (ref 1.7–7.7)
NEUTROPHILS RELATIVE PERCENT: 57 %
NITRITE, URINE: POSITIVE
PDW BLD-RTO: 15.5 % (ref 12.4–15.4)
PH UA: 6 (ref 5–8)
PLATELET # BLD: 291 K/UL (ref 135–450)
PMV BLD AUTO: 7.7 FL (ref 5–10.5)
POTASSIUM REFLEX MAGNESIUM: 3.7 MMOL/L (ref 3.5–5.1)
PROTEIN UA: NEGATIVE MG/DL
RBC # BLD: 4.88 M/UL (ref 4–5.2)
RBC UA: 2 /HPF (ref 0–4)
SODIUM BLD-SCNC: 139 MMOL/L (ref 136–145)
SPECIFIC GRAVITY UA: 1.01 (ref 1–1.03)
TOTAL PROTEIN: 7.6 G/DL (ref 6.4–8.2)
URINE REFLEX TO CULTURE: ABNORMAL
URINE TYPE: ABNORMAL
UROBILINOGEN, URINE: 0.2 E.U./DL
WBC # BLD: 8.6 K/UL (ref 4–11)
WBC UA: 7 /HPF (ref 0–5)

## 2021-07-02 PROCEDURE — 85025 COMPLETE CBC W/AUTO DIFF WBC: CPT

## 2021-07-02 PROCEDURE — 2580000003 HC RX 258: Performed by: PHYSICIAN ASSISTANT

## 2021-07-02 PROCEDURE — 83605 ASSAY OF LACTIC ACID: CPT

## 2021-07-02 PROCEDURE — 80053 COMPREHEN METABOLIC PANEL: CPT

## 2021-07-02 PROCEDURE — 84703 CHORIONIC GONADOTROPIN ASSAY: CPT

## 2021-07-02 PROCEDURE — 83690 ASSAY OF LIPASE: CPT

## 2021-07-02 PROCEDURE — 74176 CT ABD & PELVIS W/O CONTRAST: CPT

## 2021-07-02 PROCEDURE — 96365 THER/PROPH/DIAG IV INF INIT: CPT

## 2021-07-02 PROCEDURE — 81001 URINALYSIS AUTO W/SCOPE: CPT

## 2021-07-02 PROCEDURE — 99285 EMERGENCY DEPT VISIT HI MDM: CPT

## 2021-07-02 PROCEDURE — 96375 TX/PRO/DX INJ NEW DRUG ADDON: CPT

## 2021-07-02 PROCEDURE — 6360000002 HC RX W HCPCS: Performed by: PHYSICIAN ASSISTANT

## 2021-07-02 RX ORDER — PROMETHAZINE HYDROCHLORIDE 25 MG/1
25 SUPPOSITORY RECTAL EVERY 6 HOURS PRN
Qty: 10 SUPPOSITORY | Refills: 0 | Status: SHIPPED | OUTPATIENT
Start: 2021-07-02 | End: 2021-07-09

## 2021-07-02 RX ORDER — ONDANSETRON 2 MG/ML
4 INJECTION INTRAMUSCULAR; INTRAVENOUS ONCE
Status: DISCONTINUED | OUTPATIENT
Start: 2021-07-02 | End: 2021-07-02

## 2021-07-02 RX ORDER — HYDROCODONE BITARTRATE AND ACETAMINOPHEN 5; 325 MG/1; MG/1
1 TABLET ORAL ONCE
Status: DISCONTINUED | OUTPATIENT
Start: 2021-07-02 | End: 2021-07-02

## 2021-07-02 RX ORDER — HYDROCODONE BITARTRATE AND ACETAMINOPHEN 5; 325 MG/1; MG/1
1 TABLET ORAL
Qty: 15 TABLET | Refills: 0 | Status: SHIPPED | OUTPATIENT
Start: 2021-07-02 | End: 2021-07-05

## 2021-07-02 RX ORDER — ONDANSETRON 4 MG/1
4 TABLET, ORALLY DISINTEGRATING ORAL EVERY 8 HOURS PRN
Qty: 20 TABLET | Refills: 0 | Status: SHIPPED | OUTPATIENT
Start: 2021-07-02 | End: 2021-07-13 | Stop reason: ALTCHOICE

## 2021-07-02 RX ORDER — MORPHINE SULFATE 4 MG/ML
4 INJECTION, SOLUTION INTRAMUSCULAR; INTRAVENOUS ONCE
Status: COMPLETED | OUTPATIENT
Start: 2021-07-02 | End: 2021-07-02

## 2021-07-02 RX ORDER — 0.9 % SODIUM CHLORIDE 0.9 %
1000 INTRAVENOUS SOLUTION INTRAVENOUS ONCE
Status: COMPLETED | OUTPATIENT
Start: 2021-07-02 | End: 2021-07-02

## 2021-07-02 RX ORDER — PROMETHAZINE HYDROCHLORIDE 25 MG/1
25 TABLET ORAL EVERY 6 HOURS PRN
Status: DISCONTINUED | OUTPATIENT
Start: 2021-07-02 | End: 2021-07-02 | Stop reason: HOSPADM

## 2021-07-02 RX ORDER — ONDANSETRON 4 MG/1
4 TABLET, ORALLY DISINTEGRATING ORAL ONCE
Status: DISCONTINUED | OUTPATIENT
Start: 2021-07-02 | End: 2021-07-02

## 2021-07-02 RX ADMIN — MORPHINE SULFATE 4 MG: 4 INJECTION, SOLUTION INTRAMUSCULAR; INTRAVENOUS at 18:25

## 2021-07-02 RX ADMIN — SODIUM CHLORIDE 1000 ML: 9 INJECTION, SOLUTION INTRAVENOUS at 18:25

## 2021-07-02 RX ADMIN — Medication 12.5 MG: at 18:25

## 2021-07-02 ASSESSMENT — PAIN DESCRIPTION - LOCATION: LOCATION: ABDOMEN

## 2021-07-02 ASSESSMENT — PAIN SCALES - GENERAL
PAINLEVEL_OUTOF10: 8
PAINLEVEL_OUTOF10: 8
PAINLEVEL_OUTOF10: 0

## 2021-07-02 ASSESSMENT — PAIN DESCRIPTION - ORIENTATION: ORIENTATION: MID;UPPER

## 2021-07-02 ASSESSMENT — PAIN DESCRIPTION - PAIN TYPE: TYPE: ACUTE PAIN

## 2021-07-02 NOTE — ED PROVIDER NOTES
Per my interpretation:    Electrocardiogram (ECG) 7/2/2021 1710  RATE: normal  RHYTHM: normal sinus  AXIS: normal  INTERVALS: normal  ST-T WAVE CHANGES: No evidence of ST segment elevation or T-wave inversion  Prior for comparison 7/9/2020     Catrina Cronin MD  07/02/21 4613

## 2021-07-02 NOTE — ED NOTES
Water and crackers provided to pt per request. Pt is able to keep food and liquid down at this time. Pt reports that she does not feel nauseous at this time. Discharge instructions and prescriptions discussed/given to pt, all questions answered. Pt is alert and oriented x 4, skin warm and dry, respirations even and easy. No signs of acute distress noted upon discharge.  Pt left ambulatory, steady/      Tyrell Reyes RN  07/02/21 1935

## 2021-07-02 NOTE — ED PROVIDER NOTES
I independently evaluated and obtained a history and physical on Petrona Green. All diagnostic, treatment, and disposition assistants were made to myself in conjunction the advanced practice provider. For further details of this patient's emergency department encounter, please see the advanced practice provider's documentation. History: Presents emergency department complaining of lower abdominal pain. This has been going on for several days. She underwent an ultrasound of her lower abdomen on 2021. That ultrasound showed a palpable area that showed heterogeneous echogenic area measuring 0.9 x 1.2 x 4.4 cm that they felt might reflect an area of fat necrosis. Patient reports now she is having some nausea and vomiting comes in for further evaluation. Physician Exam: Non-ill-appearing female in no acute distress. Her heart rate is mildly elevated 101. She is afebrile. Respiratory rate is normal.  Patient has a BMI of 32.96. She is soft. Tender over her  scar. No redness or cellulitis. No rebound or guarding.        Yoshi Fernandez MD  [phone]

## 2021-07-02 NOTE — ED NOTES
Patient presents to ED for complaints of abdominal pain and emesis. Pt reports that she had an US down on Monday for the pain and called her MD about increase in pain and emesis today and she was told to come to ED due to possible infection shown on ultrasound. Pt reports that she does have a  shunt and the pain is around that area. Pt rates pain 8/10. Pt is alert and oriented x4, skin warm and dry, respirations even and easy. Call light within reach.      Shirley Tariq RN  07/02/21 8971

## 2021-07-02 NOTE — TELEPHONE ENCOUNTER
Pt called stating she often gets kidney infections and she feels she is starting to get one, she feels like she has gotten kicked in the back. She doesn't have transportation and was wondering if she could have medications called in. Explained she may need to be seen but would send a message to the provider.

## 2021-07-02 NOTE — ED NOTES
Bed: E-42  Expected date:   Expected time:   Means of arrival:   Comments:  Massiel Bowie RN  07/02/21 [id number]

## 2021-07-02 NOTE — ED NOTES
Unsuccessful attempts to obtain IV access. Symone ROA made aware.       Jiles Alpers, RN  07/02/21 7587

## 2021-07-04 ASSESSMENT — ENCOUNTER SYMPTOMS
NAUSEA: 1
SHORTNESS OF BREATH: 0
CHEST TIGHTNESS: 0
ABDOMINAL PAIN: 1
VOMITING: 1

## 2021-07-04 NOTE — ED PROVIDER NOTES
[address] [address] [facility] [address]  Dept: [phone]  Loc: [phone]  eMERGENCYdEPARTMENT eNCOUnter      Pt Name: Luna Laureano  MRN: [id number]  [place] 1980  Date of evaluation: 2021  Provider:Symone Chowdhury PA-C    CHIEF COMPLAINT       Chief Complaint   Patient presents with    Abdominal Pain     had an us on monday per PCP now started to have vomiting was told to come here per PCP       CRITICAL CARE TIME   Total Critical Care time was 0 minutes, excluding separately reportable procedures. There was a high probability of clinically significant/life threatening deterioration in the patient's condition which required my urgentintervention. HISTORY OF PRESENT ILLNESS  (Location/Symptom, Timing/Onset, Context/Setting, Quality, Duration,Modifying Factors, Severity.)   Luna Laureano is a 47 y.o. female who presents to the emergency department by private vehicle complaining of mid to lower abdominal pain. Patient states she has pain in her mid abdomen as well as her lower abdomen. Pain has progressed over the past couple days. She now has associated nausea and vomiting. She had outpatient ultrasound of her lower abdomen earlier this week that showed a heterogeneous echogenic area measuring 0.9 x 1.2 x 4.4 cm along  section scar favored to be area of fat necrosis. She has a  shunt in place. Patient concern for complications of  shunt given abdominal pain, nausea and vomiting. Given symptoms she was sent to the ED for further evaluation. Denies any chest pain, shortness breath, fevers, chills, urinary symptoms. Had pain rated 8/10 on arrival to abdomen. Pain along lower abdomen at incision worsened with direct pressure. Nursing Notes were reviewedand agreed with or any disagreements were addressed in the HPI.     REVIEW OF SYSTEMS    (2-9 systems for level 4, 10 or more for level 5) Review of Systems   Constitutional: Negative for chills and fever. HENT: Negative. Respiratory: Negative for chest tightness and shortness of breath. Cardiovascular: Negative. Gastrointestinal: Positive for abdominal pain, nausea and vomiting. Genitourinary: Negative. Musculoskeletal: Negative for arthralgias and myalgias. Skin: Negative. Neurological: Negative. Psychiatric/Behavioral: Negative. Except as noted above the remainder of the review of systems was reviewed and negative. PAST MEDICAL HISTORY         Diagnosis Date    ADHD (attention deficit hyperactivity disorder) 82    Anesthesia complication     pt states with general anesthesia, she gets very nauseated and wakes up with a migrain     Depression with anxiety     Difficult intubation     Encounter for imaging to screen for metal prior to MRI 2021    MRI Conditional Medtronic Non-Programmable shunt model#[id number] implanted 10/30/2020 at [facility]. Normal Mode. 1.5T or 3.0T.    ESBL (extended spectrum beta-lactamase) producing bacteria infection 2019    urine    Functional ovarian cysts 2008    rt ovary cyst x 2 yrs.     Headache(784.0)     migraines    Hiatal hernia     History of blood transfusion     History of kidney stones     History of PCOS     Hydrocephalus (HCC)     Irritable bowel syndrome 2004    had colonoscopy about 6 yrs ago    Kidney stone     Meningitis     Neutrophilic leukocytosis     Nicotine dependence     Primary osteoarthritis of left knee 2016    S/P cone biopsy of cervix 2004    Scoliosis .s     (ventriculoperitoneal) shunt status     hydrocephalus f/w Neurosurgeon at [place] [address] Wears glasses     reading       SURGICAL HISTORY           Procedure Laterality Date    APPENDECTOMY  2003    appendicitis     SECTION  2005    placenta previa    CHOLECYSTECTOMY      COLONOSCOPY  2004    COLPOSCOPY  2004    CSF SHUNT replaced at age 17    CYSTOSCOPY      stone removal   [address] [facility]    inguinal    HYSTERECTOMY, TOTAL ABDOMINAL  2016    TAHBSO, adhesions/PCOS    KNEE ARTHROSCOPY Left 2015    medial meniscectomy, chondroplasty, plica resection    LITHOTRIPSY Bilateral 12/10/2019    OTHER SURGICAL HISTORY  10/19/2016    op lap    VENTRICULOPERITONEAL SHUNT      multiple revisions, most recent        CURRENT MEDICATIONS     [unfilled]    ALLERGIES     Bentyl [dicyclomine hcl], Mushroom extract complex, Oxycodone-acetaminophen, Sulfa antibiotics, Vancomycin, Acetaminophen, Adhesive tape, Bee venom, Levaquin [levofloxacin], Tazobactam, Toradol [ketorolac tromethamine], Ceftaroline, Daptomycin, Dicyclomine, Fioricet-codeine [butalbital-apap-caff-cod], Methocarbamol, Prochlorperazine, and Zosyn [piperacillin sod-tazobactam so]    FAMILY HISTORY           Problem Relation Age of Onset    High Blood Pressure Mother     Diabetes Mother     High Cholesterol Mother     Depression Mother     Diabetes Maternal Grandmother     High Blood Pressure Maternal Grandmother     High Cholesterol Maternal Grandmother     Heart Disease Maternal Grandfather     High Blood Pressure Maternal Grandfather     Heart Disease Paternal Grandmother     Stroke Paternal Grandfather     Depression Sister     Cirrhosis Father     Rheum Arthritis Neg Hx     Osteoarthritis Neg Hx     Asthma Neg Hx     Breast Cancer Neg Hx     Cancer Neg Hx     Heart Failure Neg Hx     Hypertension Neg Hx     Migraines Neg Hx     Ovarian Cancer Neg Hx     Rashes/Skin Problems Neg Hx     Seizures Neg Hx     Thyroid Disease Neg Hx      Family Status   Relation Name Status    Mother  Alive    MGM  Alive        copd, goiter    MGF      PGM  Alive        alcoholic    PGF  Alive    Sister  Alive        scoliosis    Father   at age 50        cirhosis liver    Sister  Alive        scoliosis    Brother  Alive adhd, mild autism    Neg Hx  (Not Specified)        SOCIAL HISTORY      reports that she has been smoking cigarettes. She has a 9.00 pack-year smoking history. She has never used smokeless tobacco. She reports that she does not drink alcohol and does not use drugs. PHYSICAL EXAM    (up to 7 for level 4, 8 or more for level 5)     ED Triage Vitals [07/02/21 1526]   Enc Vitals Group      /81      Pulse 101      Resp 19      Temp 98.7 °F (37.1 °C)      Temp src       SpO2 97 %      Weight       Height       Head Circumference       Peak Flow       Pain Score       Pain Loc       Pain Edu? Excl. in [address]? Physical Exam  Vitals reviewed. Constitutional:       Appearance: Normal appearance. She is well-developed. HENT:      Head: Normocephalic and atraumatic. Pulmonary:      Effort: Pulmonary effort is normal. No respiratory distress. Abdominal:          Comments: Mild generalized periumbilical tenderness to palpation. Musculoskeletal:         General: Normal range of motion. Cervical back: Normal range of motion and neck supple. Skin:     General: Skin is warm. Neurological:      General: No focal deficit present. Mental Status: She is alert and oriented to person, place, and time.    Psychiatric:         Mood and Affect: Mood normal.         Behavior: Behavior normal.           DIAGNOSTIC RESULTS     EKG: All EKG's are interpreted by the Emergency Department Physician who either signs or Co-signs this chart in the absence of a cardiologist.    RADIOLOGY:   Non-plain film images such as CT, Ultrasound and MRI are read by the radiologist. Plain radiographic images are visualized and preliminarilyinterpreted by the emergency physician with the below findings:    Interpretation per the Radiologist below,if available at the time of this note:    CT ABDOMEN PELVIS WO CONTRAST Additional Contrast? None   Final Result   The ventricular peritoneal catheter has a normal course, terminating in the   right lower quadrant. No localized fluid collection is identified in the   peritoneal cavity. No acute inflammatory abnormality of the abdomen or pelvis. Nonobstructive right nephrolithiasis.                LABS:  Labs Reviewed   CBC WITH AUTO DIFFERENTIAL - Abnormal; Notable for the following components:       Result Value    RDW 15.5 (*)     All other components within normal limits    Narrative:     Performed at:  [facility]  [address], [place] [facility]   Phone [phone]   COMPREHENSIVE METABOLIC PANEL W/ REFLEX TO MG FOR LOW K - Abnormal; Notable for the following components:    Glucose 100 (*)     Alkaline Phosphatase 219 (*)      (*)      (*)     All other components within normal limits    Narrative:     Performed at:  [facility]  [address], [place] [facility]   Phone [phone]   URINE RT REFLEX TO CULTURE - Abnormal; Notable for the following components:    Clarity, UA CLOUDY (*)     Nitrite, Urine POSITIVE (*)     All other components within normal limits    Narrative:     Performed at:  [address] [place] [facility] [employer] [address]   Phone [phone]   MICROSCOPIC URINALYSIS - Abnormal; Notable for the following components:    Bacteria, UA 4+ (*)     WBC, UA 7 (*)     Epithelial Cells, UA 13 (*)     All other components within normal limits    Narrative:     Performed at:  [facility]  [address], [place] [facility] [employer] [address]   Phone [phone]   LIPASE    Narrative:     Performed at:  [facility]  [facility] [address] [place] [facility] [employer] [address]   Phone [phone]   HCG, SERUM, QUALITATIVE    Narrative:     Performed at:  [address] [place] [facility] [employer]   Phone [phone]   LACTIC ACID, PLASMA    Narrative:     Performed at:  [facility]  [address] [facility] [place]   Phone [phone]       All other labs were within normal range or not returned as of this dictation. EMERGENCY DEPARTMENT COURSE and DIFFERENTIAL DIAGNOSIS/MDM:   Vitals:    Vitals:    21 1526 21 1933   BP: 138/81 119/72   Pulse: 101 69   Resp: 19 16   Temp: 98.7 °F (37.1 °C)    SpO2: 97% 98%       MDM     Patient presents ED with HPI noted above. She is hemodynamically stable, afebrile and nontoxic-appearing. She is not hypoxic with oxygen saturation of 98% on room air. Physical exam as above. Tender nodule along  section scar as noted above. This is already been ultrasound which showed a region favoring necrotic fat. There is no evidence of infection cutaneously. She has no leukocytosis. Do not believe any further emergent evaluation indicated. Patient to follow-up with PCP regarding this finding. Regarding periumbilical abdominal pain, nausea and vomiting CT abdomen pelvis without contrast obtained. CT showed ventriculoperitoneal catheter in normal course terminating in right lower quadrant. No localized fluid collection identified in peritoneal cavity. No acute inflammatory abnormality of abdomen pelvis. Lactic acid normal.  CBC again no leukocytosis/leukopenia. H&H normal.  CMP showed no electrolyte abnormality, no renal impairment. Patient with elevation of liver enzymes. These are similar to previous. Patient informed of elevation need to follow-up with PCP for monitoring and reevaluation. Urine showed 4+ bacteria, 7 WBCs, 30 epithelial cells. Patient with no urinary symptoms. Fever continues specimen. Culture pending. Patient was given 1 the IV fluid, IV morphine and IV Phenergan in the ED for symptom control. Patient discharged home in stable condition after appropriate monitoring following IV pain medication.     She is discharged home with antiemetics and short course of medication for pain control. Do not drive, operate vehicle drink alcohol taking. Patient seen evaluated by attending physician, Dr. Yarelis Segovia. The patient tolerated their visit well. They were seen and evaluated by the attending physician, Dr. Yarelis Segovia who agreed with the assessment and plan. The patient and / or the family were informed of the results of any tests, a time was given to answer questions, a plan was proposed and they agreed with plan. CONSULTS:  None    PROCEDURES:  Procedures    FINAL IMPRESSION      1. Non-intractable vomiting with nausea, unspecified vomiting type    2. Periumbilical abdominal pain          DISPOSITION/PLAN   [unfilled]    PATIENT REFERRED TO:  [facility] Emergency Department  [address] [id number]  [phone]  Go to   If symptoms worsen    FREDRICK Harrell - CNP  [address] [place] [address]  [phone]    Call today  For follow up and reevaluation early next week. DISCHARGE MEDICATIONS:  Discharge Medication List as of 7/2/2021  7:24 PM      START taking these medications    Details   HYDROcodone-acetaminophen (NORCO) 5-325 MG per tablet Take 1 tablet by mouth every 6-8 hours as needed for Pain for up to 3 days. , Disp-15 tablet, R-0Print      ondansetron (ZOFRAN ODT) 4 MG disintegrating tablet Take 1 tablet by mouth every 8 hours as needed for Nausea, Disp-20 tablet, R-0Print      promethazine (PROMETHEGAN) 25 MG suppository Place 1 suppository rectally every 6 hours as needed for Nausea (or vomiting), Disp-10 suppository, R-0Print             (Please note that portions of this note were completed with a voice recognition program.  Efforts were made to edit the dictations but occasionally words are mis-transcribed.)    [address] [facility], PA-HIEN          [address], [place]  07/04/21 2071

## 2021-07-07 DIAGNOSIS — R11.2 NAUSEA AND VOMITING, INTRACTABILITY OF VOMITING NOT SPECIFIED, UNSPECIFIED VOMITING TYPE: ICD-10-CM

## 2021-07-07 DIAGNOSIS — Q03.9 CONGENITAL HYDROCEPHALUS (HCC): Chronic | ICD-10-CM

## 2021-07-07 DIAGNOSIS — R10.30 LOWER ABDOMINAL PAIN: ICD-10-CM

## 2021-07-07 DIAGNOSIS — R19.09 ABDOMINAL MASS OF OTHER SITE: Primary | ICD-10-CM

## 2021-07-07 DIAGNOSIS — R61 EXCESSIVE SWEATING: ICD-10-CM

## 2021-07-07 DIAGNOSIS — R51.9 CHRONIC NONINTRACTABLE HEADACHE, UNSPECIFIED HEADACHE TYPE: Chronic | ICD-10-CM

## 2021-07-07 DIAGNOSIS — Z13.1 SCREENING FOR DIABETES MELLITUS: ICD-10-CM

## 2021-07-07 DIAGNOSIS — B35.1 NAIL FUNGAL INFECTION: ICD-10-CM

## 2021-07-07 DIAGNOSIS — R19.09 ABDOMINAL MASS OF OTHER SITE: ICD-10-CM

## 2021-07-07 DIAGNOSIS — G89.29 CHRONIC NONINTRACTABLE HEADACHE, UNSPECIFIED HEADACHE TYPE: Chronic | ICD-10-CM

## 2021-07-07 DIAGNOSIS — Z13.220 SCREENING FOR LIPID DISORDERS: ICD-10-CM

## 2021-07-07 DIAGNOSIS — Z11.4 SCREENING FOR HIV WITHOUT PRESENCE OF RISK FACTORS: ICD-10-CM

## 2021-07-07 LAB
A/G RATIO: 1.6 (ref 1.1–2.2)
ALBUMIN SERPL-MCNC: 4.7 G/DL (ref 3.4–5)
ALP BLD-CCNC: 179 U/L (ref 40–129)
ALT SERPL-CCNC: 37 U/L (ref 10–40)
ANION GAP SERPL CALCULATED.3IONS-SCNC: 12 MMOL/L (ref 3–16)
AST SERPL-CCNC: 18 U/L (ref 15–37)
BASOPHILS ABSOLUTE: 0 K/UL (ref 0–0.2)
BASOPHILS RELATIVE PERCENT: 0.4 %
BILIRUB SERPL-MCNC: 0.3 MG/DL (ref 0–1)
BUN BLDV-MCNC: 14 MG/DL (ref 7–20)
CALCIUM SERPL-MCNC: 10.3 MG/DL (ref 8.3–10.6)
CHLORIDE BLD-SCNC: 96 MMOL/L (ref 99–110)
CHOLESTEROL, TOTAL: 272 MG/DL (ref 0–199)
CO2: 29 MMOL/L (ref 21–32)
CREAT SERPL-MCNC: 0.8 MG/DL (ref 0.6–1.1)
EOSINOPHILS ABSOLUTE: 0.2 K/UL (ref 0–0.6)
EOSINOPHILS RELATIVE PERCENT: 2.6 %
GFR AFRICAN AMERICAN: >60
GFR NON-AFRICAN AMERICAN: >60
GLOBULIN: 2.9 G/DL
GLUCOSE BLD-MCNC: 102 MG/DL (ref 70–99)
HCT VFR BLD CALC: 44.2 % (ref 36–48)
HDLC SERPL-MCNC: 30 MG/DL (ref 40–60)
HEMOGLOBIN: 14.7 G/DL (ref 12–16)
LDL CHOLESTEROL CALCULATED: ABNORMAL MG/DL
LDL CHOLESTEROL DIRECT: 167 MG/DL
LYMPHOCYTES ABSOLUTE: 3.2 K/UL (ref 1–5.1)
LYMPHOCYTES RELATIVE PERCENT: 33.4 %
MCH RBC QN AUTO: 28.9 PG (ref 26–34)
MCHC RBC AUTO-ENTMCNC: 33.3 G/DL (ref 31–36)
MCV RBC AUTO: 86.6 FL (ref 80–100)
MONOCYTES ABSOLUTE: 0.6 K/UL (ref 0–1.3)
MONOCYTES RELATIVE PERCENT: 6.2 %
NEUTROPHILS ABSOLUTE: 5.5 K/UL (ref 1.7–7.7)
NEUTROPHILS RELATIVE PERCENT: 57.4 %
PDW BLD-RTO: 16.1 % (ref 12.4–15.4)
PLATELET # BLD: 323 K/UL (ref 135–450)
PMV BLD AUTO: 8.7 FL (ref 5–10.5)
POTASSIUM SERPL-SCNC: 4.6 MMOL/L (ref 3.5–5.1)
RBC # BLD: 5.1 M/UL (ref 4–5.2)
SODIUM BLD-SCNC: 137 MMOL/L (ref 136–145)
TOTAL PROTEIN: 7.6 G/DL (ref 6.4–8.2)
TRIGL SERPL-MCNC: 423 MG/DL (ref 0–150)
TSH REFLEX: 1.43 UIU/ML (ref 0.27–4.2)
VITAMIN D 25-HYDROXY: 43 NG/ML
VLDLC SERPL CALC-MCNC: ABNORMAL MG/DL
WBC # BLD: 9.6 K/UL (ref 4–11)

## 2021-07-07 RX ORDER — PROMETHAZINE HYDROCHLORIDE 12.5 MG/1
12.5 TABLET ORAL 4 TIMES DAILY PRN
Qty: 20 TABLET | Refills: 0 | Status: SHIPPED | OUTPATIENT
Start: 2021-07-07 | End: 2021-07-14

## 2021-07-07 NOTE — TELEPHONE ENCOUNTER
Patient called to inform PCP that she had labs drawn today. She is also requesting a refill of phenergan to help with nausea and vomiting. She feels the same as previously, but she does not want to go to the ER. She also requested a name of a surgeon, necrosis from ultrasound report . The paperwork for the ER did not provide one. Will investigate her chart to determine if surgeon referral is needed. The CT did not reveal a similar finding. She also states she is not allergic to tylenol.

## 2021-07-08 DIAGNOSIS — E78.2 MIXED HYPERLIPIDEMIA: ICD-10-CM

## 2021-07-08 DIAGNOSIS — R73.03 PREDIABETES: Primary | ICD-10-CM

## 2021-07-08 LAB
ESTIMATED AVERAGE GLUCOSE: 131.2 MG/DL
HBA1C MFR BLD: 6.2 %
HIV AG/AB: NORMAL
HIV ANTIGEN: NORMAL
HIV-1 ANTIBODY: NORMAL
HIV-2 AB: NORMAL

## 2021-07-08 RX ORDER — SIMVASTATIN 10 MG
10 TABLET ORAL NIGHTLY
Qty: 90 TABLET | Refills: 1 | Status: SHIPPED | OUTPATIENT
Start: 2021-07-08 | End: 2022-08-23

## 2021-07-12 ENCOUNTER — TELEPHONE (OUTPATIENT)
Dept: PRIMARY CARE CLINIC | Age: 41
End: 2021-07-12

## 2021-07-12 ENCOUNTER — INITIAL CONSULT (OUTPATIENT)
Dept: SURGERY | Age: 41
End: 2021-07-12
Payer: COMMERCIAL

## 2021-07-12 VITALS
HEIGHT: 59 IN | BODY MASS INDEX: 32.86 KG/M2 | WEIGHT: 163 LBS | DIASTOLIC BLOOD PRESSURE: 80 MMHG | SYSTOLIC BLOOD PRESSURE: 110 MMHG

## 2021-07-12 DIAGNOSIS — R22.2 ABDOMINAL WALL MASS: Primary | ICD-10-CM

## 2021-07-12 PROCEDURE — 4004F PT TOBACCO SCREEN RCVD TLK: CPT | Performed by: SURGERY

## 2021-07-12 PROCEDURE — G8417 CALC BMI ABV UP PARAM F/U: HCPCS | Performed by: SURGERY

## 2021-07-12 PROCEDURE — G8428 CUR MEDS NOT DOCUMENT: HCPCS | Performed by: SURGERY

## 2021-07-12 PROCEDURE — 99203 OFFICE O/P NEW LOW 30 MIN: CPT | Performed by: SURGERY

## 2021-07-12 ASSESSMENT — ENCOUNTER SYMPTOMS: ABDOMINAL PAIN: 1

## 2021-07-12 NOTE — PROGRESS NOTES
Angelina Flanagan (:  1980) is a 47 y.o. female,New patient, here for evaluation of the following chief complaint(s):  New Patient (Pt is here today, referred by the ER, for abdominal pain.)         ASSESSMENT/PLAN:  1. Abdominal wall mass      Removal in OR    The risks, benefits and alternatives to the planned procedure were discussed. Patient expressed an understanding and is willing to proceed. Subjective   SUBJECTIVE/OBJECTIVE:  HPI  Chief Complaint: abdominal wall mass    Patient referred by ER for evaluation of a painful mass in the abdominal wall. Patient reports symptoms of pain with direct pressure in addition to increased size. Location of symptoms is lower midline. Symptoms were first noted months to years ago with worsening over past few weeks. Previous evaluation includes ultrasound showing a heterogenous mass in the area, possible fat necrosis. Patient has a history of c section and  shunt without issue. Will plan following treatment: removal of abdominal wall mass. Past Medical History:   Diagnosis Date    ADHD (attention deficit hyperactivity disorder) 82    Anesthesia complication     pt states with general anesthesia, she gets very nauseated and wakes up with a migrain     Depression with anxiety     Difficult intubation     Encounter for imaging to screen for metal prior to MRI 2021    MRI Conditional Medtronic Non-Programmable shunt model#[id number] implanted 10/30/2020 at [facility]. Normal Mode. 1.5T or 3.0T.    ESBL (extended spectrum beta-lactamase) producing bacteria infection 2019    urine    Functional ovarian cysts 2008    rt ovary cyst x 2 yrs.     Headache(784.0)     migraines    Hiatal hernia     History of blood transfusion     History of kidney stones     History of PCOS     Hydrocephalus (HCC)     Irritable bowel syndrome     had colonoscopy about 6 yrs ago    Kidney stone     Meningitis     Neutrophilic leukocytosis     Nicotine dependence     Primary osteoarthritis of left knee 2016    S/P cone biopsy of cervix 2004    Scoliosis .s     (ventriculoperitoneal) shunt status     hydrocephalus f/w Neurosurgeon at [place] [address] Wears glasses     reading       Past Surgical History:   Procedure Laterality Date    APPENDECTOMY  2003    appendicitis     SECTION  2005    placenta previa    CHOLECYSTECTOMY      COLONOSCOPY  2004    COLPOSCOPY      CSF SHUNT      replaced at age 17    CYSTOSCOPY      stone removal   [facility]    inguinal    HYSTERECTOMY, TOTAL ABDOMINAL  2016    TAHBSO, adhesions/PCOS    KNEE ARTHROSCOPY Left 2015    medial meniscectomy, chondroplasty, plica resection    LITHOTRIPSY Bilateral 12/10/2019    OTHER SURGICAL HISTORY  10/19/2016    op lap    VENTRICULOPERITONEAL SHUNT      multiple revisions, most recent        Current Outpatient Medications   Medication Sig Dispense Refill    metFORMIN (GLUCOPHAGE) 500 MG tablet Take 1 tablet by mouth daily (with breakfast) 90 tablet 1    simvastatin (ZOCOR) 10 MG tablet Take 1 tablet by mouth nightly 90 tablet 1    promethazine (PHENERGAN) 12.5 MG tablet Take 1 tablet by mouth 4 times daily as needed for Nausea 20 tablet 0    terbinafine (LAMISIL) 250 MG tablet Take 1 tablet by mouth daily 42 tablet 0    ondansetron (ZOFRAN ODT) 4 MG disintegrating tablet Take 1 tablet by mouth every 8 hours as needed for Nausea 20 tablet 0    traMADol (ULTRAM) 50 MG tablet tramadol 50 mg tablet      SUMAtriptan (IMITREX) 100 MG tablet Take 1 tablet by mouth once as needed for Migraine 9 tablet 0     No current facility-administered medications for this visit. Prior to Admission medications    Medication Sig Start Date End Date Taking?  Authorizing Provider   metFORMIN (GLUCOPHAGE) 500 MG tablet Take 1 tablet by mouth daily (with breakfast) 21  Yes Martha Gilliam, APRN - CNP   simvastatin (ZOCOR) 10 MG tablet Take 1 tablet by mouth nightly 7/8/21  Yes FREDRICK Garg CNP   promethazine (PHENERGAN) 12.5 MG tablet Take 1 tablet by mouth 4 times daily as needed for Nausea 7/7/21 7/14/21 Yes FREDRICK Norris CNP   terbinafine (LAMISIL) 250 MG tablet Take 1 tablet by mouth daily 6/17/21 7/29/21 Yes FREDRICK Young CNP   ondansetron (ZOFRAN ODT) 4 MG disintegrating tablet Take 1 tablet by mouth every 8 hours as needed for Nausea 7/2/21   Symone Chowdhury PA-C   traMADol (ULTRAM) 50 MG tablet tramadol 50 mg tablet 5/13/21   Historical Provider, MD   SUMAtriptan (IMITREX) 100 MG tablet Take 1 tablet by mouth once as needed for Migraine 6/17/21 6/17/21  FREDRICK Garg CNP         Allergies   Allergen Reactions    Bentyl [Dicyclomine Hcl] Shortness Of Breath and Anxiety    Mushroom Extract Complex Anaphylaxis     Can't eat mushrooms.  Oxycodone-Acetaminophen Nausea And Vomiting     Tolerates Norco/Vicodin ok  Patient can not tolerate Percocet well.  Extreme vomiting       Sulfa Antibiotics Shortness Of Breath    Vancomycin Anaphylaxis and Hives    Adhesive Tape     Bee Venom Swelling    Levaquin [Levofloxacin]     Tazobactam     Toradol [Ketorolac Tromethamine] Hives and Itching     Tolerates PO NSAIDs fine    Ceftaroline Rash    Daptomycin Swelling and Rash    Dicyclomine Anxiety    Fioricet-Codeine [Butalbital-Apap-Caff-Cod] Anxiety    Methocarbamol Rash    Prochlorperazine Anxiety    Zosyn [Piperacillin Sod-Tazobactam So] Hives     Also causes nausea, SOB, dizziness       Social History     Socioeconomic History    Marital status: Single     Spouse name: Not on file    Number of children: Not on file    Years of education: Not on file    Highest education level: Not on file   Occupational History    Occupation: disability, SSI    Tobacco Use    Smoking status: Current Some Day Smoker     Packs/day: 0.50     Years: 18.00     Pack years: 9.00     Types: Cigarettes    Smokeless tobacco: Never Used   Vaping Use    Vaping Use: Never used   Substance and Sexual Activity    Alcohol use: No     Alcohol/week: 0.0 standard drinks    Drug use: No    Sexual activity: Not Currently     Partners: Male   Other Topics Concern    Not on file   Social History Narrative    Not on file     Social Determinants of Health     Financial Resource Strain:     Difficulty of Paying Living Expenses:    Food Insecurity:     Worried About Running Out of Food in the Last Year:     Ran Out of Food in the Last Year:    Transportation Needs:     Lack of Transportation (Medical):      Lack of Transportation (Non-Medical):    Physical Activity:     Days of Exercise per Week:     Minutes of Exercise per Session:    Stress:     Feeling of Stress :    Social Connections:     Frequency of Communication with Friends and Family:     Frequency of Social Gatherings with Friends and Family:     Attends Mandaen Services:     Active Member of Clubs or Organizations:     Attends Club or Organization Meetings:     Marital Status:    Intimate Partner Violence:     Fear of Current or Ex-Partner:     Emotionally Abused:     Physically Abused:     Sexually Abused:        Family History   Problem Relation Age of Onset    High Blood Pressure Mother     Diabetes Mother     High Cholesterol Mother     Depression Mother     Diabetes Maternal Grandmother     High Blood Pressure Maternal Grandmother     High Cholesterol Maternal Grandmother     Heart Disease Maternal Grandfather     High Blood Pressure Maternal Grandfather     Heart Disease Paternal Grandmother     Stroke Paternal Grandfather     Depression Sister     Cirrhosis Father     Rheum Arthritis Neg Hx     Osteoarthritis Neg Hx     Asthma Neg Hx     Breast Cancer Neg Hx     Cancer Neg Hx     Heart Failure Neg Hx     Hypertension Neg Hx     Migraines Neg Hx     Ovarian Cancer Neg Hx     Rashes/Skin Problems Neg Hx     Seizures Neg

## 2021-07-12 NOTE — TELEPHONE ENCOUNTER
Patient stated she has \"shunt\" that has been placed in head on the left side. Stated she woke up this morning her left eye is swollen shut, she has blisters behind her left ear and is in pain. Would like a CB with guidance.      Please advise

## 2021-07-12 NOTE — TELEPHONE ENCOUNTER
Per her mychart messages with Dillon Berkowitz she was told to go to the ED. Patient wrote back understand and said \"ok\".

## 2021-07-12 NOTE — PATIENT INSTRUCTIONS
Removal of abdominal wall mass scheduled for 7/14/21 at 10 arrive at 8:30. Nothing to eat or drink after midnight. You will need someone to bring you home.

## 2021-07-12 NOTE — TELEPHONE ENCOUNTER
Advised patient per [place], patient requesting a CB from clinical staff regarding blurred vision questions she has    Please advise

## 2021-07-12 NOTE — LETTER
Surgery Scheduling Form:      DEMOGRAPHICS:                                                                                                         .    Patient Name:  Troy Peterson  Patient :  1980   Patient SS#:      Patient Phone:  [phone] (home)  Alt. Patient Phone:                     Patient Address:  [facility] [address] [address]    PCP:  FREDRICK Gonzalez CNP  Insurance:  Payor: [facility] Medal / Plan: Gina Balling / Product Type: *No Product type* / Insurance ID Number:    Payor/Plan Subscr  Sex Relation Sub. Ins. ID Effective Group Num   1. [employer] - * CAROLINE MEJÍA 1980 Female Self [id number] 1/1/15 [facility] [address]         DIAGNOSIS & PROCEDURE:                                                                                       .    Diagnosis:   R22.2 - Abdominal wall mass  Operation:  Removal of Abdominal Wall Mass  Location:  [facility]  Surgeon: Diallo Tapia MD    [facility] INFORMATION:                                                                                    .    Surgeon's Scheduling Instruction:  elective  Requested Date: 21   OR Time: 10:00          Patient Arrival Time: 8:30  OR Time Required:  45  Minutes  Anesthesia:  MAC/TIVA  Equipment:                                                           SA Required:  Yes  Status:  Outpatient          Standard C-Arm:  No  PAT Required:  covid test                             Best Time to Call:   Any   Patient Requested to see PCP for Pre-op H & P:  Dr Sade Smith will do H & P  Special Comments:                           Diallo Tapia MD     05:29  PRE-CERTIFICATION INFORMATION:                                                                           .    Procedure:       CPT Code Modifier          [id number]

## 2021-07-13 NOTE — PROGRESS NOTES
C-Difficile admission screening and protocol:     * Admitted with diarrhea? YES____    NO__X___     *Prior history of C-Diff. In last 3 months? YES____   NO__X___     *Antibiotic use in the past 6-8 weeks? NO__X____YES______                 If yes which  ANTIBIOTIC AND REASON______     *Prior hospitalization or nursing home in the last month?  YES____   NO___X_

## 2021-07-13 NOTE — PROGRESS NOTES
[address] [facility] time___0830_________        Surgery time_____0955_______    Take the following medications with a sip of water: Follow your MD/Surgeons pre-procedure instructions regarding your medications    Do not eat or drink anything after 12:00 midnight prior to your surgery. This includes water chewing gum, mints and ice chips. You may brush your teeth and gargle the morning of your surgery, but do not swallow the water     Please see your family doctor/pediatrician for a history and physical and/or concerning medications. Bring any test results/reports from your physicians office. If you are under the care of a heart doctor or specialist doctor, please be aware that you may be asked to them for clearance    You may be asked to stop blood thinners such as Coumadin, Plavix, Fragmin, Lovenox, etc., or any anti-inflammatories such as:  Aspirin, Ibuprofen, Advil, Naproxen prior to your surgery. We also ask that you stop any OTC medications such as fish oil, vitamin E, glucosamine, garlic, Multivitamins, COQ 10, etc.    We ask that you do not smoke 24 hours prior to surgery  We ask that you do not  drink any alcoholic beverages 24 hours prior to surgery     You must make arrangements for a responsible adult to take you home after your surgery. For your safety you will not be allowed to leave alone or drive yourself home. Your surgery will be cancelled if you do not have a ride home. Also for your safety, it is strongly suggested that someone stay with you the first 24 hours after your surgery. A parent or legal guardian must accompany a child scheduled for surgery and plan to stay at the hospital until the child is discharged. Please do not bring other children with you. For your comfort, please wear simple loose fitting clothing to the hospital.  Please do not bring valuables.     Do not wear any make-up or nail polish on your fingers or toes      For your safety, please do not wear any jewelry or body piercing's on the day of surgery. All jewelry must be removed. If you have dentures, they will be removed before going to operating room. For your convenience, we will provide you with a container. If you wear contact lenses or glasses, they will be removed, please bring a case for them. If you have a living will and a durable power of  for healthcare, please bring in a copy. As part of our patient safety program to minimize surgical site infections, we ask you to do the following:    · Please notify your surgeon if you develop any illness between         now and the  day of your surgery. · This includes a cough, cold, fever, sore throat, nausea,         or vomiting, and diarrhea, etc.  ·  Please notify your surgeon if you experience dizziness, shortness         of breath or blurred vision between now and the time of your surgery. Do not shave your operative site 96 hours prior to surgery. For face and neck surgery, men may use an electric razor 48 hours   prior to surgery. You may shower the night before surgery or the morning of   your surgery with an antibacterial soap. You will need to bring a photo ID and insurance card    [facility] has an onsite pharmacy, would you like to utilize our pharmacy     If you will be staying overnight and use a C-pap machine, please bring   your C-pap to hospital     Our goal is to provide you with excellent care, therefore, visitors will be limited to two(2) in the room at a time so that we may focus on providing this care for you. Please contact pre-admission testing if you have any further questions. [facility] phone number:  [phone] PAT fax number:  [phone]  Please note these are generalized instructions for all surgical cases, you may be provided with more specific instructions according to your surgery.

## 2021-07-13 NOTE — PROGRESS NOTES
Preoperative Screening for Elective Surgery/Invasive Procedures While COVID-19 present in the community     Have you tested positive or have been told to self-isolate for COVID-19 like symptoms within the past 28 days? covid testing to be done DOS   Do you currently have any of the following symptoms? NO  o Fever >100.0 F or 99.9 F in immunocompromised patients? o New onset cough, shortness of breath or difficulty breathing?  o New onset sore throat, myalgia (muscle aches and pains), headache, loss of taste/smell or diarrhea?  Have you had a potential exposure to COVID-19 within the past 14 days by: NO  o Close contact with a confirmed case? o Close contact with a healthcare worker,  or essential infrastructure worker (grocery store, [employer], gas station, public utilities or transportation)? o Do you reside in a congregate setting such as; skilled nursing facility, adult home, correctional facility, homeless shelter or other institutional setting?  o Have you had recent travel to a known COVID-19 hotspot? Indicate if the patient has a positive screen by answering yes to one or more of the above questions. Patients who test positive or screen positive prior to surgery or on the day of surgery should be evaluated in conjunction with the surgeon/proceduralist/anesthesiologist to determine the urgency of the procedure.

## 2021-07-14 ENCOUNTER — ANESTHESIA (OUTPATIENT)
Dept: OPERATING ROOM | Age: 41
End: 2021-07-14
Payer: COMMERCIAL

## 2021-07-14 ENCOUNTER — ANESTHESIA EVENT (OUTPATIENT)
Dept: OPERATING ROOM | Age: 41
End: 2021-07-14
Payer: COMMERCIAL

## 2021-07-14 ENCOUNTER — HOSPITAL ENCOUNTER (OUTPATIENT)
Age: 41
Setting detail: OUTPATIENT SURGERY
Discharge: HOME | End: 2021-07-14
Attending: SURGERY | Admitting: SURGERY
Payer: COMMERCIAL

## 2021-07-14 VITALS
SYSTOLIC BLOOD PRESSURE: 100 MMHG | TEMPERATURE: 96.6 F | BODY MASS INDEX: 33.6 KG/M2 | DIASTOLIC BLOOD PRESSURE: 64 MMHG | WEIGHT: 166.67 LBS | HEART RATE: 77 BPM | OXYGEN SATURATION: 97 % | RESPIRATION RATE: 18 BRPM | HEIGHT: 59 IN

## 2021-07-14 VITALS
RESPIRATION RATE: 14 BRPM | DIASTOLIC BLOOD PRESSURE: 51 MMHG | OXYGEN SATURATION: 91 % | SYSTOLIC BLOOD PRESSURE: 87 MMHG

## 2021-07-14 DIAGNOSIS — R22.2 ABDOMINAL WALL MASS: ICD-10-CM

## 2021-07-14 LAB
GLUCOSE BLD-MCNC: 106 MG/DL (ref 70–99)
GLUCOSE BLD-MCNC: 119 MG/DL (ref 70–99)
PERFORMED ON: ABNORMAL
PERFORMED ON: ABNORMAL
SARS-COV-2, NAAT: NOT DETECTED

## 2021-07-14 PROCEDURE — 2580000003 HC RX 258

## 2021-07-14 PROCEDURE — 22903 EXC ABD LES SC 3 CM/>: CPT | Performed by: SURGERY

## 2021-07-14 PROCEDURE — 87635 SARS-COV-2 COVID-19 AMP PRB: CPT

## 2021-07-14 PROCEDURE — 3600000012 HC SURGERY LEVEL 2 ADDTL 15MIN: Performed by: SURGERY

## 2021-07-14 PROCEDURE — 2500000003 HC RX 250 WO HCPCS: Performed by: SURGERY

## 2021-07-14 PROCEDURE — 3700000000 HC ANESTHESIA ATTENDED CARE: Performed by: SURGERY

## 2021-07-14 PROCEDURE — 7100000001 HC PACU RECOVERY - ADDTL 15 MIN: Performed by: SURGERY

## 2021-07-14 PROCEDURE — 3600000002 HC SURGERY LEVEL 2 BASE: Performed by: SURGERY

## 2021-07-14 PROCEDURE — 2500000003 HC RX 250 WO HCPCS

## 2021-07-14 PROCEDURE — 6360000002 HC RX W HCPCS

## 2021-07-14 PROCEDURE — 6360000002 HC RX W HCPCS: Performed by: ANESTHESIOLOGY

## 2021-07-14 PROCEDURE — 3700000001 HC ADD 15 MINUTES (ANESTHESIA): Performed by: SURGERY

## 2021-07-14 PROCEDURE — 7100000011 HC PHASE II RECOVERY - ADDTL 15 MIN: Performed by: SURGERY

## 2021-07-14 PROCEDURE — 7100000000 HC PACU RECOVERY - FIRST 15 MIN: Performed by: SURGERY

## 2021-07-14 PROCEDURE — 88307 TISSUE EXAM BY PATHOLOGIST: CPT

## 2021-07-14 PROCEDURE — 7100000010 HC PHASE II RECOVERY - FIRST 15 MIN: Performed by: SURGERY

## 2021-07-14 PROCEDURE — 2709999900 HC NON-CHARGEABLE SUPPLY: Performed by: SURGERY

## 2021-07-14 RX ORDER — MEPERIDINE HYDROCHLORIDE 25 MG/ML
12.5 INJECTION INTRAMUSCULAR; INTRAVENOUS; SUBCUTANEOUS
Status: DISCONTINUED | OUTPATIENT
Start: 2021-07-14 | End: 2021-07-14 | Stop reason: HOSPADM

## 2021-07-14 RX ORDER — PROPOFOL 10 MG/ML
INJECTION, EMULSION INTRAVENOUS CONTINUOUS PRN
Status: DISCONTINUED | OUTPATIENT
Start: 2021-07-14 | End: 2021-07-14 | Stop reason: SDUPTHER

## 2021-07-14 RX ORDER — FENTANYL CITRATE 50 UG/ML
25 INJECTION, SOLUTION INTRAMUSCULAR; INTRAVENOUS EVERY 5 MIN PRN
Status: DISCONTINUED | OUTPATIENT
Start: 2021-07-14 | End: 2021-07-14 | Stop reason: HOSPADM

## 2021-07-14 RX ORDER — SODIUM CHLORIDE 9 MG/ML
INJECTION, SOLUTION INTRAVENOUS CONTINUOUS PRN
Status: DISCONTINUED | OUTPATIENT
Start: 2021-07-14 | End: 2021-07-14 | Stop reason: SDUPTHER

## 2021-07-14 RX ORDER — PROMETHAZINE HYDROCHLORIDE 25 MG/ML
6.25 INJECTION, SOLUTION INTRAMUSCULAR; INTRAVENOUS ONCE
Status: COMPLETED | OUTPATIENT
Start: 2021-07-14 | End: 2021-07-14

## 2021-07-14 RX ORDER — CLINDAMYCIN PHOSPHATE 600 MG/50ML
600 INJECTION INTRAVENOUS ONCE
Status: DISCONTINUED | OUTPATIENT
Start: 2021-07-14 | End: 2021-07-14

## 2021-07-14 RX ORDER — FENTANYL CITRATE 50 UG/ML
50 INJECTION, SOLUTION INTRAMUSCULAR; INTRAVENOUS EVERY 5 MIN PRN
Status: DISCONTINUED | OUTPATIENT
Start: 2021-07-14 | End: 2021-07-14 | Stop reason: HOSPADM

## 2021-07-14 RX ORDER — CLINDAMYCIN PHOSPHATE 900 MG/50ML
900 INJECTION INTRAVENOUS ONCE
Status: COMPLETED | OUTPATIENT
Start: 2021-07-14 | End: 2021-07-14

## 2021-07-14 RX ORDER — ONDANSETRON 2 MG/ML
4 INJECTION INTRAMUSCULAR; INTRAVENOUS
Status: DISCONTINUED | OUTPATIENT
Start: 2021-07-14 | End: 2021-07-14 | Stop reason: HOSPADM

## 2021-07-14 RX ORDER — PROPOFOL 10 MG/ML
INJECTION, EMULSION INTRAVENOUS PRN
Status: DISCONTINUED | OUTPATIENT
Start: 2021-07-14 | End: 2021-07-14 | Stop reason: SDUPTHER

## 2021-07-14 RX ORDER — FENTANYL CITRATE 50 UG/ML
INJECTION, SOLUTION INTRAMUSCULAR; INTRAVENOUS PRN
Status: DISCONTINUED | OUTPATIENT
Start: 2021-07-14 | End: 2021-07-14 | Stop reason: SDUPTHER

## 2021-07-14 RX ORDER — LIDOCAINE HYDROCHLORIDE 10 MG/ML
INJECTION, SOLUTION EPIDURAL; INFILTRATION; INTRACAUDAL; PERINEURAL
Status: COMPLETED | OUTPATIENT
Start: 2021-07-14 | End: 2021-07-14

## 2021-07-14 RX ORDER — MIDAZOLAM HYDROCHLORIDE 1 MG/ML
2 INJECTION INTRAMUSCULAR; INTRAVENOUS ONCE
Status: COMPLETED | OUTPATIENT
Start: 2021-07-14 | End: 2021-07-14

## 2021-07-14 RX ORDER — HYDRALAZINE HYDROCHLORIDE 20 MG/ML
5 INJECTION INTRAMUSCULAR; INTRAVENOUS EVERY 10 MIN PRN
Status: DISCONTINUED | OUTPATIENT
Start: 2021-07-14 | End: 2021-07-14 | Stop reason: HOSPADM

## 2021-07-14 RX ORDER — HYDROCODONE BITARTRATE AND ACETAMINOPHEN 5; 325 MG/1; MG/1
1 TABLET ORAL EVERY 6 HOURS PRN
Qty: 20 TABLET | Refills: 0 | Status: SHIPPED | OUTPATIENT
Start: 2021-07-14 | End: 2021-07-19

## 2021-07-14 RX ORDER — LIDOCAINE HYDROCHLORIDE 20 MG/ML
INJECTION, SOLUTION EPIDURAL; INFILTRATION; INTRACAUDAL; PERINEURAL PRN
Status: DISCONTINUED | OUTPATIENT
Start: 2021-07-14 | End: 2021-07-14 | Stop reason: SDUPTHER

## 2021-07-14 RX ADMIN — PROPOFOL 120 MCG/KG/MIN: 10 INJECTION, EMULSION INTRAVENOUS at 10:21

## 2021-07-14 RX ADMIN — FENTANYL CITRATE 50 MCG: 50 INJECTION, SOLUTION INTRAMUSCULAR; INTRAVENOUS at 11:17

## 2021-07-14 RX ADMIN — PROPOFOL 80 MG: 10 INJECTION, EMULSION INTRAVENOUS at 10:21

## 2021-07-14 RX ADMIN — FENTANYL CITRATE 25 MCG: 50 INJECTION INTRAMUSCULAR; INTRAVENOUS at 10:27

## 2021-07-14 RX ADMIN — SODIUM CHLORIDE: 9 INJECTION, SOLUTION INTRAVENOUS at 10:19

## 2021-07-14 RX ADMIN — MIDAZOLAM 2 MG: 1 INJECTION INTRAMUSCULAR; INTRAVENOUS at 09:54

## 2021-07-14 RX ADMIN — FENTANYL CITRATE 50 MCG: 50 INJECTION INTRAMUSCULAR; INTRAVENOUS at 10:21

## 2021-07-14 RX ADMIN — PROMETHAZINE HYDROCHLORIDE 6.25 MG: 25 INJECTION INTRAMUSCULAR; INTRAVENOUS at 09:46

## 2021-07-14 RX ADMIN — FENTANYL CITRATE 25 MCG: 50 INJECTION INTRAMUSCULAR; INTRAVENOUS at 10:32

## 2021-07-14 RX ADMIN — CLINDAMYCIN PHOSPHATE 900 MG: 18 INJECTION, SOLUTION INTRAMUSCULAR; INTRAVENOUS at 10:24

## 2021-07-14 RX ADMIN — LIDOCAINE HYDROCHLORIDE 60 MG: 20 INJECTION, SOLUTION EPIDURAL; INFILTRATION; INTRACAUDAL; PERINEURAL at 10:21

## 2021-07-14 ASSESSMENT — PAIN DESCRIPTION - PROGRESSION
CLINICAL_PROGRESSION: GRADUALLY WORSENING
CLINICAL_PROGRESSION: NOT CHANGED
CLINICAL_PROGRESSION: NOT CHANGED
CLINICAL_PROGRESSION: GRADUALLY IMPROVING

## 2021-07-14 ASSESSMENT — PAIN DESCRIPTION - DESCRIPTORS
DESCRIPTORS: BURNING
DESCRIPTORS: BURNING;SHARP
DESCRIPTORS: ACHING;CRAMPING
DESCRIPTORS: SHARP;BURNING
DESCRIPTORS: BURNING;SHARP

## 2021-07-14 ASSESSMENT — PULMONARY FUNCTION TESTS
PIF_VALUE: 0

## 2021-07-14 ASSESSMENT — LIFESTYLE VARIABLES: SMOKING_STATUS: 1

## 2021-07-14 ASSESSMENT — PAIN - FUNCTIONAL ASSESSMENT
PAIN_FUNCTIONAL_ASSESSMENT: ACTIVITIES ARE NOT PREVENTED
PAIN_FUNCTIONAL_ASSESSMENT: 0-10
PAIN_FUNCTIONAL_ASSESSMENT: ACTIVITIES ARE NOT PREVENTED
PAIN_FUNCTIONAL_ASSESSMENT: ACTIVITIES ARE NOT PREVENTED

## 2021-07-14 ASSESSMENT — PAIN DESCRIPTION - FREQUENCY
FREQUENCY: INTERMITTENT
FREQUENCY: CONTINUOUS
FREQUENCY: CONTINUOUS
FREQUENCY: INTERMITTENT

## 2021-07-14 ASSESSMENT — PAIN DESCRIPTION - ONSET
ONSET: ON-GOING

## 2021-07-14 ASSESSMENT — PAIN DESCRIPTION - PAIN TYPE
TYPE: SURGICAL PAIN

## 2021-07-14 ASSESSMENT — PAIN SCALES - GENERAL
PAINLEVEL_OUTOF10: 5
PAINLEVEL_OUTOF10: 8
PAINLEVEL_OUTOF10: 5
PAINLEVEL_OUTOF10: 5

## 2021-07-14 ASSESSMENT — PAIN DESCRIPTION - LOCATION
LOCATION: ABDOMEN

## 2021-07-14 ASSESSMENT — PAIN DESCRIPTION - ORIENTATION
ORIENTATION: MID
ORIENTATION: MID

## 2021-07-14 NOTE — ANESTHESIA PRE PROCEDURE
[facility] Department of Anesthesiology  Pre-Anesthesia Evaluation/Consultation       Name:  Theola Babinski  : 1980  Age:  47 y.o. MRN:  [id number]  Date: 2021           Surgeon: Surgeon(s):  Marlys Prince MD    Procedure: Procedure(s):  REMOVAL OF ABDOMINAL WALL MASS     Allergies   Allergen Reactions    Bee Venom Shortness Of Breath and Swelling    Bentyl [Dicyclomine Hcl] Shortness Of Breath and Anxiety    Mushroom Extract Complex Anaphylaxis     Can't eat mushrooms.  Oxycodone-Acetaminophen Nausea And Vomiting     Tolerates Norco/Vicodin ok  Patient can not tolerate Percocet well.  Extreme vomiting       Sulfa Antibiotics Shortness Of Breath    Vancomycin Anaphylaxis and Hives    Adhesive Tape Dermatitis    Toradol [Ketorolac Tromethamine] Hives and Itching     Injectable only  Tolerates PO NSAIDs fine    Ceftaroline Rash    Daptomycin Swelling and Rash    Dicyclomine Anxiety    Fioricet-Codeine [Butalbital-Apap-Caff-Cod] Anxiety    Levaquin [Levofloxacin] Anxiety    Methocarbamol Rash    Prochlorperazine Anxiety    Tazobactam Nausea And Vomiting and Anxiety    Zosyn [Piperacillin Sod-Tazobactam So] Hives     Also causes nausea, SOB, dizziness     Patient Active Problem List   Diagnosis     (ventriculoperitoneal) shunt status    Scoliosis    Prediabetes    Tobacco smoker    Chronic nonintractable headache    Onychomycosis    Congenital hydrocephalus (HCC)    Viral meningitis    Pyuria    Hypokalemia    Sepsis (HCC)    Meningitis    Allergy to multiple antibiotics    Ureteritis    Acute cystitis without hematuria    Urinary retention    Guzman catheter in place    Lactic acidosis    Neutrophilic leukocytosis    Complicated UTI (urinary tract infection)    Nausea & vomiting    Renal colic    Volume depletion    Headache    Herpes zoster    Intractable migraine with aura without status migrainosus    History of brain shunt    Shunt malfunction     Past Medical History:   Diagnosis Date    ADHD (attention deficit hyperactivity disorder) 80    Depression with anxiety     Diabetes mellitus ([address]     pre-diabetes    Difficult intubation     Encounter for imaging to screen for metal prior to MRI 2021    MRI Conditional Medtronic Non-Programmable shunt model#[id number] implanted 10/30/2020 [facility]. Normal Mode. 1.5T or 3.0T.    ESBL (extended spectrum beta-lactamase) producing bacteria infection 2019    urine    Functional ovarian cysts 2008    rt ovary cyst x 2 yrs.     Headache(784.0)     migraines    History of blood transfusion     at birth   [employer] History of kidney stones     History of PCOS     Hydrocephalus ([facility] [address]     Hyperlipidemia     Irritable bowel syndrome     had colonoscopy about 6 yrs ago    Meningitis 2686    Neutrophilic leukocytosis     Nicotine dependence     PONV (postoperative nausea and vomiting)     very nauseated and sometimes wakes up with a Migraine--happened once after brain surgery    Primary osteoarthritis of left knee 2016    S/P cone biopsy of cervix     Scoliosis .s     (ventriculoperitoneal) shunt status     hydrocephalus f/w [facility]geon [facility]     (ventriculoperitoneal) shunt status     Wears glasses     reading     Past Surgical History:   Procedure Laterality Date    APPENDECTOMY  2003    appendicitis     SECTION      placenta previa    CHOLECYSTECTOMY      COLONOSCOPY  2004    COLPOSCOPY      CSF SHUNT      replaced at age 17   [employer] CYSTOSCOPY      stone removal    HEMORRHOID SURGERY      HERNIA REPAIR Bilateral     inguinal    HERNIA REPAIR      hiatal hernia    HYSTERECTOMY, TOTAL ABDOMINAL  2016    TAHBSO, adhesions/PCOS    KNEE ARTHROSCOPY Left 2015    medial meniscectomy, chondroplasty, plica resection    LITHOTRIPSY Bilateral 12/10/2019    OTHER SURGICAL HISTORY  10/19/2016    op lap    VENTRICULOPERITONEAL SHUNT      multiple revisions, most recent      Social History     Tobacco Use    Smoking status: Current Some Day Smoker     Packs/day: 0.50     Years: 18.00     Pack years: 9.00     Types: Cigarettes    Smokeless tobacco: Never Used   Vaping Use    Vaping Use: Never used   Substance Use Topics    Alcohol use: No     Alcohol/week: 0.0 standard drinks    Drug use: Never     Medications  No current facility-administered medications on file prior to encounter.      Current Outpatient Medications on File Prior to Encounter   Medication Sig Dispense Refill    metFORMIN (GLUCOPHAGE) 500 MG tablet Take 1 tablet by mouth daily (with breakfast) 90 tablet 1    simvastatin (ZOCOR) 10 MG tablet Take 1 tablet by mouth nightly 90 tablet 1    promethazine (PHENERGAN) 12.5 MG tablet Take 1 tablet by mouth 4 times daily as needed for Nausea 20 tablet 0    terbinafine (LAMISIL) 250 MG tablet Take 1 tablet by mouth daily 42 tablet 0    SUMAtriptan (IMITREX) 100 MG tablet Take 1 tablet by mouth once as needed for Migraine 9 tablet 0     Current Facility-Administered Medications   Medication Dose Route Frequency Provider Last Rate Last Admin    clindamycin (CLEOCIN) 900 mg in dextrose 5 % 50 mL IVPB  900 mg Intravenous Once Joel Abbott MD         Vital Signs (Current)   Vitals:    21 1031 21   BP:  122/75   Pulse:  89   Resp:  16   Temp:  97 °F (36.1 °C)   TempSrc:  Temporal   SpO2:  96%   Weight: 150 lb (68 kg) 166 lb 10.7 oz (75.6 kg)   Height: 4' 11\" (1.499 m) 4' 11\" (1.499 m)                                            Vital Signs Statistics (for past 48 hrs)     Temp  Av °F (36.1 °C)  Min: 97 °F (36.1 °C)   Min taken time: 21  Max: 97 °F (36.1 °C)   Max taken time: 21  Pulse  Av  Min: 89   Min taken time: 21  Max: 89   Max taken time: 21  Resp  Av  Min: 16   Min taken time: 21 09  Max: 12   Max taken time: 21  BP  Min: 122/75   Min taken time: 21  Max: 122/75   Max taken time: 21  SpO2  Av %  Min: 96 %   Min taken time: 21  Max: 96 %   Max taken time: 21  BP Readings from Last 3 Encounters:   21 122/75   21 110/80   21 119/72       BMI  Body mass index is 33.66 kg/m². Estimated body mass index is 33.66 kg/m² as calculated from the following:    Height as of this encounter: 4' 11\" (1.499 m). Weight as of this encounter: 166 lb 10.7 oz (75.6 kg). CBC   Lab Results   Component Value Date    WBC 9.6 2021    RBC 5.10 2021    HGB 14.7 2021    HCT 44.2 2021    MCV 86.6 2021    RDW 16.1 2021     2021     CMP    Lab Results   Component Value Date     2021    K 4.6 2021    K 3.7 2021    CL 96 2021    CO2 29 2021    BUN 14 2021    CREATININE 0.8 2021    GFRAA >60 2021    GFRAA >60 10/08/2011    AGRATIO 1.6 2021    LABGLOM >60 2021    GLUCOSE 102 2021    PROT 7.6 2021    PROT 7.3 2011    CALCIUM 10.3 2021    BILITOT 0.3 2021    ALKPHOS 179 2021    AST 18 2021    ALT 37 2021     BMP    Lab Results   Component Value Date     2021    K 4.6 2021    K 3.7 2021    CL 96 2021    CO2 29 2021    BUN 14 2021    CREATININE 0.8 2021    CALCIUM 10.3 2021    GFRAA >60 2021    GFRAA >60 10/08/2011    LABGLOM >60 2021    GLUCOSE 102 2021     POCGlucose  No results for input(s): GLUCOSE in the last 72 hours.    Coags    Lab Results   Component Value Date    PROTIME 11.5 2019    INR 1.01 2019    APTT 35.9      HCG (If Applicable)   Lab Results   Component Value Date    PREGTESTUR Negative 04/15/2020      ABGs No results found for: PHART, PO2ART, UPO0QRM, CWC3JNZ, BEART, T3KSKRFV   Type & Screen (If Applicable)  No results found for: LABABO, LABRH                         BMI: Wt Readings from Last 3 Encounters:       NPO Status:   Date of last liquid consumption: 07/13/21   Time of last liquid consumption: 2359   Date of last solid food consumption: 07/13/21      Time of last solid consumption: 2300       Anesthesia Evaluation  Patient summary reviewed   history of anesthetic complications: PONV. Airway: Mallampati: II  TM distance: >3 FB   Neck ROM: full  Mouth opening: > = 3 FB Dental: normal exam     Comment: No loose teeth    Pulmonary: breath sounds clear to auscultation  (+) current smoker    (-) COPD, asthma and sleep apnea                           Cardiovascular:  Exercise tolerance: good (>4 METS),       (-) hypertension, past MI, CABG/stent,  angina and no hyperlipidemia        Rate: normal                    Neuro/Psych:   (+) headaches: migraine headaches, psychiatric history:   (-) seizures, TIA and CVA            ROS comment: H/o  shunt GI/Hepatic/Renal:        (-) GERD, hepatitis, liver disease and no renal disease       Endo/Other:    (+) DiabetesType II DM, , .    (-) hypothyroidism               Abdominal:             Vascular:     - DVT and PE. Other Findings:           Anesthesia Plan      MAC     ASA 3       Induction: intravenous. Anesthetic plan and risks discussed with patient. Plan discussed with CRNA. This pre-anesthesia assessment may be used as a history and physical.    DOS STAFF ADDENDUM:    Pt seen and examined, chart reviewed (including anesthesia, drug and allergy history). No interval changes to history and physical examination. Anesthetic plan, risks, benefits, alternatives, and personnel involved discussed with patient. Patient verbalized an understanding and agrees to proceed.       Nichole Brooks MD  July 14, 2021  9:12 AM

## 2021-07-14 NOTE — PROGRESS NOTES
Patient's visitor Klaudia Rose verbalized understanding of discharge instructions. They have no questions at this time.

## 2021-07-14 NOTE — OP NOTE
[address] [place]                                 OPERATIVE REPORT    PATIENT NAME: Tamika Valentino                      :        1980  MED REC NO:   [id number]                          ROOM:  ACCOUNT NO:   [de-identified]                           ADMIT DATE: 2021  PROVIDER:     Dewey Matamoros MD    DATE OF PROCEDURE:  2021    PREOPERATIVE DIAGNOSIS:  Subcutaneous mass of the abdominal wall. POSTOPERATIVE DIAGNOSIS:  Subcutaneous mass of the abdominal wall. PROCEDURE PERFORMED:  Excision of subcutaneous mass from the abdominal  wall measuring 4 cm. SURGEON:  Dewey Matamoros MD    SPECIMEN:  Subcutaneous mass. ESTIMATED BLOOD LOSS:  Less than 50 mL. DISPOSITION:  To recovery in stable condition. INDICATION:  The patient is a 45-year-old female with multiple previous  surgeries in the lower abdomen from C-sections. She has developed a  painful area with nodularity in the lower midline near the   scar. An ultrasound was obtained demonstrating a heterogeneous mass in  the area, likely due to necrosis. Due to ongoing pain, removal was  requested and after discussing the risks, benefits and alternatives, she  has agreed to proceed. PROCEDURE:  The patient was brought to the operating room, placed  supine, anesthesia delivered, and the abdomen prepped and draped in a  sterile fashion. Local anesthetic was infused and an elliptical  incision made through prior Pfannenstiel incision and carried into the  subcutaneous tissue. As noted on the CT, we did encounter a necrotic  mass in the area which could possibly be fat necrosis or even retained  prior hematoma. Regardless, there was scar tissue and necrotic tissue  in the area. This was then excised including some healthy fat around  the area to ensure it was completely excised. In total, the specimen  measured 4 cm in diameter.

## 2021-07-14 NOTE — BRIEF OP NOTE
Brief Postoperative Note      Patient: Fly Fu  YOB: 1980  MRN: [id number]    Date of Procedure: 7/14/2021    Pre-Op Diagnosis: ABDOMINAL WALL MASS    Post-Op Diagnosis: Same       Procedure(s):  REMOVAL OF ABDOMINAL WALL MASS, 4cm, subcutaneous    Surgeon(s):  Jian Colon MD    Assistant:  Surgical Assistant: Jayesh Pappas    Anesthesia: Monitor Anesthesia Care    Estimated Blood Loss (mL): less than 50     Complications: None    Specimens:   ID Type Source Tests Collected by Time Destination   A : a) abdominal wall mass Specimen Abdomen SURGICAL PATHOLOGY Jian Colon MD 7/14/2021 1036        Implants:  * No implants in log *      Drains: * No LDAs found *    Findings: necrotic mass in subcutaneous layer of lower midline    Electronically signed by Shahbaz Yeager MD on 7/14/2021 at 10:49 AM

## 2021-07-14 NOTE — PROGRESS NOTES
CLINICAL PHARMACY NOTE: MEDS TO BEDS    Total # of Prescriptions Filled: 1   The following medications were delivered to the patient:  Current Discharge Medication List      START taking these medications    Details   HYDROcodone-acetaminophen (NORCO) 5-325 MG per tablet Take 1 tablet by mouth every 6 hours as needed for Pain for up to 5 days. Intended supply: 5 days.  Take lowest dose possible to manage pain  Qty: 20 tablet, Refills: 0    Comments: Reduce doses taken as pain becomes manageable  Associated Diagnoses: Abdominal wall mass         ·   ·     Additional Documentation:

## 2021-07-14 NOTE — PROGRESS NOTES
Patient tolerating food and drink well. Patient does not have any complaints of nausea. Patient up to chair with no complications. Patient has no complaints of dizziness at this time.

## 2021-07-19 ENCOUNTER — PATIENT MESSAGE (OUTPATIENT)
Dept: PRIMARY CARE CLINIC | Age: 41
End: 2021-07-19

## 2021-07-19 DIAGNOSIS — L30.9 ECZEMA, UNSPECIFIED TYPE: ICD-10-CM

## 2021-07-19 DIAGNOSIS — L03.019 INFECTION OF NAIL BED OF FINGER, UNSPECIFIED LATERALITY: Primary | ICD-10-CM

## 2021-07-19 RX ORDER — TRIAMCINOLONE ACETONIDE 0.25 MG/G
OINTMENT TOPICAL
Qty: 1 TUBE | Refills: 0 | Status: SHIPPED | OUTPATIENT
Start: 2021-07-19 | End: 2021-07-26

## 2021-07-19 NOTE — TELEPHONE ENCOUNTER
Spoke with the pt. Pt states that she has tried everything that she has recommended. She wants to make an appt.  Pt added to the schedule

## 2021-07-19 NOTE — TELEPHONE ENCOUNTER
Pt states that she is still taking Lamisil. She had some kind of IV ATB and it helped clear her nails up and now her sx are returning. Also her eye is swelling. States that she had gel put on her nails and she is allergic to the gel. Not sure if the eye is related. From: Diane De La Cruz  To: FREDRICK Novak - CNP  Sent: 7/19/2021 12:29 PM EDT  Subject: Visit Follow-Up Question    It's my left eye but in the pic it's my right eye. And fingers are bad but my thumb is peeing really bad.

## 2021-07-21 ENCOUNTER — TELEPHONE (OUTPATIENT)
Dept: SURGERY | Age: 41
End: 2021-07-21

## 2021-07-21 ENCOUNTER — APPOINTMENT (OUTPATIENT)
Dept: CT IMAGING | Age: 41
End: 2021-07-21
Payer: COMMERCIAL

## 2021-07-21 ENCOUNTER — HOSPITAL ENCOUNTER (EMERGENCY)
Age: 41
Discharge: HOME | End: 2021-07-21
Attending: EMERGENCY MEDICINE
Payer: COMMERCIAL

## 2021-07-21 ENCOUNTER — TELEPHONE (OUTPATIENT)
Dept: PRIMARY CARE CLINIC | Age: 41
End: 2021-07-21

## 2021-07-21 VITALS
HEART RATE: 97 BPM | SYSTOLIC BLOOD PRESSURE: 123 MMHG | BODY MASS INDEX: 32.42 KG/M2 | DIASTOLIC BLOOD PRESSURE: 93 MMHG | OXYGEN SATURATION: 97 % | TEMPERATURE: 99 F | RESPIRATION RATE: 14 BRPM | WEIGHT: 160.5 LBS

## 2021-07-21 DIAGNOSIS — R11.0 NAUSEA: ICD-10-CM

## 2021-07-21 DIAGNOSIS — L03.311 CELLULITIS OF ABDOMINAL WALL: Primary | ICD-10-CM

## 2021-07-21 LAB
A/G RATIO: 1.2 (ref 1.1–2.2)
ALBUMIN SERPL-MCNC: 4.5 G/DL (ref 3.4–5)
ALP BLD-CCNC: 127 U/L (ref 40–129)
ALT SERPL-CCNC: 7 U/L (ref 10–40)
ANION GAP SERPL CALCULATED.3IONS-SCNC: 17 MMOL/L (ref 3–16)
AST SERPL-CCNC: 13 U/L (ref 15–37)
BASOPHILS ABSOLUTE: 0.1 K/UL (ref 0–0.2)
BASOPHILS RELATIVE PERCENT: 0.4 %
BILIRUB SERPL-MCNC: 0.3 MG/DL (ref 0–1)
BUN BLDV-MCNC: 16 MG/DL (ref 7–20)
CALCIUM SERPL-MCNC: 9.5 MG/DL (ref 8.3–10.6)
CHLORIDE BLD-SCNC: 101 MMOL/L (ref 99–110)
CO2: 19 MMOL/L (ref 21–32)
CREAT SERPL-MCNC: 0.7 MG/DL (ref 0.6–1.1)
EOSINOPHILS ABSOLUTE: 0 K/UL (ref 0–0.6)
EOSINOPHILS RELATIVE PERCENT: 0.1 %
GFR AFRICAN AMERICAN: >60
GFR NON-AFRICAN AMERICAN: >60
GLOBULIN: 3.7 G/DL
GLUCOSE BLD-MCNC: 139 MG/DL (ref 70–99)
HCT VFR BLD CALC: 44 % (ref 36–48)
HEMOGLOBIN: 15.2 G/DL (ref 12–16)
LACTIC ACID, SEPSIS: 1.5 MMOL/L (ref 0.4–1.9)
LIPASE: 13 U/L (ref 13–60)
LYMPHOCYTES ABSOLUTE: 1.6 K/UL (ref 1–5.1)
LYMPHOCYTES RELATIVE PERCENT: 10.5 %
MCH RBC QN AUTO: 29.5 PG (ref 26–34)
MCHC RBC AUTO-ENTMCNC: 34.5 G/DL (ref 31–36)
MCV RBC AUTO: 85.4 FL (ref 80–100)
MONOCYTES ABSOLUTE: 0.2 K/UL (ref 0–1.3)
MONOCYTES RELATIVE PERCENT: 1.2 %
NEUTROPHILS ABSOLUTE: 13 K/UL (ref 1.7–7.7)
NEUTROPHILS RELATIVE PERCENT: 87.8 %
PDW BLD-RTO: 15.1 % (ref 12.4–15.4)
PLATELET # BLD: 335 K/UL (ref 135–450)
PMV BLD AUTO: 7.5 FL (ref 5–10.5)
POTASSIUM REFLEX MAGNESIUM: 4.5 MMOL/L (ref 3.5–5.1)
RBC # BLD: 5.16 M/UL (ref 4–5.2)
SODIUM BLD-SCNC: 137 MMOL/L (ref 136–145)
TOTAL PROTEIN: 8.2 G/DL (ref 6.4–8.2)
WBC # BLD: 14.8 K/UL (ref 4–11)

## 2021-07-21 PROCEDURE — 80053 COMPREHEN METABOLIC PANEL: CPT

## 2021-07-21 PROCEDURE — 83690 ASSAY OF LIPASE: CPT

## 2021-07-21 PROCEDURE — 6360000004 HC RX CONTRAST MEDICATION: Performed by: NURSE PRACTITIONER

## 2021-07-21 PROCEDURE — 99284 EMERGENCY DEPT VISIT MOD MDM: CPT

## 2021-07-21 PROCEDURE — 96375 TX/PRO/DX INJ NEW DRUG ADDON: CPT

## 2021-07-21 PROCEDURE — 36415 COLL VENOUS BLD VENIPUNCTURE: CPT

## 2021-07-21 PROCEDURE — 2580000003 HC RX 258: Performed by: NURSE PRACTITIONER

## 2021-07-21 PROCEDURE — 87040 BLOOD CULTURE FOR BACTERIA: CPT

## 2021-07-21 PROCEDURE — 83605 ASSAY OF LACTIC ACID: CPT

## 2021-07-21 PROCEDURE — 6360000002 HC RX W HCPCS: Performed by: NURSE PRACTITIONER

## 2021-07-21 PROCEDURE — 96374 THER/PROPH/DIAG INJ IV PUSH: CPT

## 2021-07-21 PROCEDURE — 74177 CT ABD & PELVIS W/CONTRAST: CPT

## 2021-07-21 PROCEDURE — 85025 COMPLETE CBC W/AUTO DIFF WBC: CPT

## 2021-07-21 PROCEDURE — 6370000000 HC RX 637 (ALT 250 FOR IP): Performed by: NURSE PRACTITIONER

## 2021-07-21 PROCEDURE — 96372 THER/PROPH/DIAG INJ SC/IM: CPT

## 2021-07-21 PROCEDURE — 96376 TX/PRO/DX INJ SAME DRUG ADON: CPT

## 2021-07-21 RX ORDER — ONDANSETRON 2 MG/ML
4 INJECTION INTRAMUSCULAR; INTRAVENOUS ONCE
Status: COMPLETED | OUTPATIENT
Start: 2021-07-21 | End: 2021-07-21

## 2021-07-21 RX ORDER — PROMETHAZINE HYDROCHLORIDE 25 MG/1
25 TABLET ORAL EVERY 6 HOURS PRN
Qty: 20 TABLET | Refills: 0 | Status: SHIPPED | OUTPATIENT
Start: 2021-07-21 | End: 2021-07-28

## 2021-07-21 RX ORDER — DOXYCYCLINE HYCLATE 100 MG
100 TABLET ORAL 2 TIMES DAILY
Qty: 14 TABLET | Refills: 0 | Status: ON HOLD | OUTPATIENT
Start: 2021-07-21 | End: 2021-07-24 | Stop reason: HOSPADM

## 2021-07-21 RX ORDER — ONDANSETRON 4 MG/1
4-8 TABLET, ORALLY DISINTEGRATING ORAL EVERY 12 HOURS PRN
Qty: 12 TABLET | Refills: 0 | Status: SHIPPED | OUTPATIENT
Start: 2021-07-21 | End: 2021-07-28 | Stop reason: ALTCHOICE

## 2021-07-21 RX ORDER — DOXYCYCLINE HYCLATE 100 MG
100 TABLET ORAL ONCE
Status: COMPLETED | OUTPATIENT
Start: 2021-07-21 | End: 2021-07-21

## 2021-07-21 RX ORDER — PROMETHAZINE HYDROCHLORIDE 25 MG/ML
12.5 INJECTION, SOLUTION INTRAMUSCULAR; INTRAVENOUS ONCE
Status: COMPLETED | OUTPATIENT
Start: 2021-07-21 | End: 2021-07-21

## 2021-07-21 RX ORDER — 0.9 % SODIUM CHLORIDE 0.9 %
30 INTRAVENOUS SOLUTION INTRAVENOUS ONCE
Status: DISCONTINUED | OUTPATIENT
Start: 2021-07-21 | End: 2021-07-21

## 2021-07-21 RX ORDER — IBUPROFEN 800 MG/1
800 TABLET ORAL ONCE
Status: COMPLETED | OUTPATIENT
Start: 2021-07-21 | End: 2021-07-21

## 2021-07-21 RX ORDER — MORPHINE SULFATE 4 MG/ML
4 INJECTION, SOLUTION INTRAMUSCULAR; INTRAVENOUS ONCE
Status: COMPLETED | OUTPATIENT
Start: 2021-07-21 | End: 2021-07-21

## 2021-07-21 RX ORDER — PROMETHAZINE HYDROCHLORIDE 25 MG/1
25 SUPPOSITORY RECTAL EVERY 6 HOURS PRN
Qty: 7 SUPPOSITORY | Refills: 0 | Status: SHIPPED | OUTPATIENT
Start: 2021-07-21 | End: 2021-07-28

## 2021-07-21 RX ORDER — 0.9 % SODIUM CHLORIDE 0.9 %
30 INTRAVENOUS SOLUTION INTRAVENOUS ONCE
Status: COMPLETED | OUTPATIENT
Start: 2021-07-21 | End: 2021-07-21

## 2021-07-21 RX ORDER — ACETAMINOPHEN 500 MG
1000 TABLET ORAL ONCE
Status: COMPLETED | OUTPATIENT
Start: 2021-07-21 | End: 2021-07-21

## 2021-07-21 RX ADMIN — IOPAMIDOL 100 ML: 755 INJECTION, SOLUTION INTRAVENOUS at 19:36

## 2021-07-21 RX ADMIN — MORPHINE SULFATE 4 MG: 4 INJECTION, SOLUTION INTRAMUSCULAR; INTRAVENOUS at 20:53

## 2021-07-21 RX ADMIN — ACETAMINOPHEN 1000 MG: 500 TABLET ORAL at 18:09

## 2021-07-21 RX ADMIN — PROMETHAZINE HYDROCHLORIDE 12.5 MG: 25 INJECTION INTRAMUSCULAR; INTRAVENOUS at 20:53

## 2021-07-21 RX ADMIN — SODIUM CHLORIDE 2184 ML: 9 INJECTION, SOLUTION INTRAVENOUS at 18:20

## 2021-07-21 RX ADMIN — MORPHINE SULFATE 4 MG: 4 INJECTION, SOLUTION INTRAMUSCULAR; INTRAVENOUS at 18:19

## 2021-07-21 RX ADMIN — IBUPROFEN 800 MG: 800 TABLET, FILM COATED ORAL at 20:12

## 2021-07-21 RX ADMIN — DOXYCYCLINE HYCLATE 100 MG: 100 TABLET, COATED ORAL at 20:52

## 2021-07-21 RX ADMIN — SODIUM CHLORIDE 1000 ML: 9 INJECTION, SOLUTION INTRAVENOUS at 18:08

## 2021-07-21 RX ADMIN — ONDANSETRON 4 MG: 2 INJECTION INTRAMUSCULAR; INTRAVENOUS at 20:12

## 2021-07-21 RX ADMIN — ONDANSETRON 4 MG: 2 INJECTION INTRAMUSCULAR; INTRAVENOUS at 18:09

## 2021-07-21 ASSESSMENT — PAIN SCALES - GENERAL
PAINLEVEL_OUTOF10: 0
PAINLEVEL_OUTOF10: 10
PAINLEVEL_OUTOF10: 10
PAINLEVEL_OUTOF10: 8
PAINLEVEL_OUTOF10: 10
PAINLEVEL_OUTOF10: 8

## 2021-07-21 ASSESSMENT — PAIN DESCRIPTION - PROGRESSION: CLINICAL_PROGRESSION: NOT CHANGED

## 2021-07-21 ASSESSMENT — PAIN DESCRIPTION - PAIN TYPE: TYPE: ACUTE PAIN

## 2021-07-21 ASSESSMENT — PAIN DESCRIPTION - DESCRIPTORS: DESCRIPTORS: SORE

## 2021-07-21 ASSESSMENT — PAIN DESCRIPTION - FREQUENCY: FREQUENCY: CONTINUOUS

## 2021-07-21 ASSESSMENT — PAIN DESCRIPTION - ONSET: ONSET: GRADUAL

## 2021-07-21 ASSESSMENT — PAIN DESCRIPTION - ORIENTATION: ORIENTATION: RIGHT

## 2021-07-21 ASSESSMENT — PAIN DESCRIPTION - LOCATION: LOCATION: ABDOMEN

## 2021-07-21 NOTE — TELEPHONE ENCOUNTER
Pt states she is having a hard time after sx 7/14, she has fever of 101, chills, painful vomiting, denies diarrhea.  Wishes for a call

## 2021-07-21 NOTE — TELEPHONE ENCOUNTER
Having pain, had surgery 5 days ago. Instructed her to call the surgeon that did the recent operation.

## 2021-07-21 NOTE — ED PROVIDER NOTES
[address] [address] [address] [facility] [address]  Dept: [phone]  Loc: [address] ENCOUNTER        This patient was seen and evaluated per myself in conjunction with ED attending Dr. Melinda Estrada. CHIEF COMPLAINT    Chief Complaint   Patient presents with    Post-op Problem     RLQ pain around surgical site       HPI    Kavita Hayes is a 47 y.o. female who presents with abdominal pain localized in the RLQ of the abdomen where her surgical incision site is located. Onset was 7 days ago since she had a lipoma removed in the RLQ of her abdomen. The duration has been intermittent since the onset, stating that she is having a hard time tolerating her p.o. narcotics. She takes a half a pill, has been vomiting. States that she has been running a temperature of 101. States that she called her primary care told her to call the general surgeon's office. Spoke to somebody from the [address] [place] [address] surgeon office who told her to come to the ED. The pain is associated with fevers, nausea and vomiting and surgical site pain. The pain is 6/10 in severity. The quality of the pain is sharp and cramping. The context is that the symptoms started spontaneously. There are no alleviating factors. Came to the ED for further evaluation and treatment. REVIEW OF SYSTEMS    GI: see HPI, + vomiting, no diarrhea, no hematochezia  Cardiac: No chest pain, shortness of breath, palpitations or syncope  Pulmonary: No difficulty breathing or new cough  General: +fevers  : No dysuria, No hematuria  See HPI for further details. All other systems reviewed and are negative.     PAST MEDICAL & SURGICAL HISTORY    Past Medical History:   Diagnosis Date    ADHD (attention deficit hyperactivity disorder) 82    Depression with anxiety     Diabetes mellitus ([facility] [address]     pre-diabetes    Difficult intubation     Encounter for imaging to screen for metal prior to MRI 2021    MRI Conditional Medtronic Non-Programmable shunt model#[id number] implanted 10/30/2020 at [facility]. Normal Mode. 1.5T or 3.0T.    ESBL (extended spectrum beta-lactamase) producing bacteria infection 2019    urine    Functional ovarian cysts 2008    rt ovary cyst x 2 yrs.     Headache(784.0)     migraines    History of blood transfusion     at birth   [facility] History of kidney stones     History of PCOS     Hydrocephalus ([address]     Hyperlipidemia     Irritable bowel syndrome 2004    had colonoscopy about 6 yrs ago    Meningitis     Neutrophilic leukocytosis     Nicotine dependence     PONV (postoperative nausea and vomiting)     very nauseated and sometimes wakes up with a Migraine--happened once after brain surgery    Primary osteoarthritis of left knee 2016    S/P cone biopsy of cervix     Scoliosis .s     (ventriculoperitoneal) shunt status     hydrocephalus f/w Neurosurgeon [facility]     (ventriculoperitoneal) shunt status     Wears glasses     reading     Past Surgical History:   Procedure Laterality Date    ABDOMEN SURGERY N/A 2021    REMOVAL OF ABDOMINAL WALL MASS performed by Rina Kaiser MD at [address] [place] [address]      appendicitis     SECTION  2005    placenta previa    CHOLECYSTECTOMY      COLONOSCOPY  2004    COLPOSCOPY      CSF SHUNT      replaced at age 17   [facility] CYSTOSCOPY      stone removal    HEMORRHOID SURGERY      HERNIA REPAIR Bilateral     inguinal    HERNIA REPAIR      hiatal hernia    HYSTERECTOMY, TOTAL ABDOMINAL  2016    TAHBSO, adhesions/PCOS    KNEE ARTHROSCOPY Left 2015    medial meniscectomy, chondroplasty, plica resection    LITHOTRIPSY Bilateral 12/10/2019    OTHER SURGICAL HISTORY  10/19/2016    op lap    VENTRICULOPERITONEAL SHUNT      multiple revisions, most recent        CURRENT MEDICATIONS  (may include discharge medications prescribed in the ED)  Current Outpatient Rx   Medication Sig Dispense Refill    doxycycline hyclate (VIBRA-TABS) 100 MG tablet Take 1 tablet by mouth 2 times daily for 7 days 14 tablet 0    ondansetron (ZOFRAN ODT) 4 MG disintegrating tablet Take 1-2 tablets by mouth every 12 hours as needed for Nausea May Sub regular tablet (non-ODT) if insurance does not cover ODT. 12 tablet 0    promethazine (PROMETHEGAN) 25 MG suppository Place 1 suppository rectally every 6 hours as needed for Nausea (or vomiting) 7 suppository 0    promethazine (PHENERGAN) 25 MG tablet Take 1 tablet by mouth every 6 hours as needed for Nausea WARNING:  May cause drowsiness. May impair ability to operate vehicles or machinery. Do not use in combination with alcohol. 20 tablet 0    mupirocin (BACTROBAN) 2 % ointment Apply 3 times daily. 1 Tube 1    triamcinolone (KENALOG) 0.025 % ointment Apply topically 2 times daily. 1 Tube 0    metFORMIN (GLUCOPHAGE) 500 MG tablet Take 1 tablet by mouth daily (with breakfast) 90 tablet 1    simvastatin (ZOCOR) 10 MG tablet Take 1 tablet by mouth nightly 90 tablet 1    terbinafine (LAMISIL) 250 MG tablet Take 1 tablet by mouth daily 42 tablet 0    SUMAtriptan (IMITREX) 100 MG tablet Take 1 tablet by mouth once as needed for Migraine 9 tablet 0       ALLERGIES    Allergies   Allergen Reactions    Bee Venom Shortness Of Breath and Swelling    Bentyl [Dicyclomine Hcl] Shortness Of Breath and Anxiety    Mushroom Extract Complex Anaphylaxis     Can't eat mushrooms.  Oxycodone-Acetaminophen Nausea And Vomiting     Tolerates Norco/Vicodin ok  Patient can not tolerate Percocet well.  Extreme vomiting       Sulfa Antibiotics Shortness Of Breath    Vancomycin Anaphylaxis and Hives    Adhesive Tape Dermatitis    Toradol [Ketorolac Tromethamine] Hives and Itching     Injectable only  Tolerates PO NSAIDs fine    Ceftaroline Rash    Daptomycin Swelling and Rash    Dicyclomine Anxiety    Fioricet-Codeine [Butalbital-Apap-Caff-Cod] Anxiety    Levaquin [Levofloxacin] Anxiety    Methocarbamol Rash    Prochlorperazine Anxiety    Tazobactam Nausea And Vomiting and Anxiety    Zosyn [Piperacillin Sod-Tazobactam So] Hives     Also causes nausea, SOB, dizziness       SOCIAL AND FAMILY HISTORY    Social History     Socioeconomic History    Marital status: Single     Spouse name: None    Number of children: None    Years of education: None    Highest education level: None   Occupational History    Occupation: disability, SSI    Tobacco Use    Smoking status: Current Some Day Smoker     Packs/day: 0.50     Years: 18.00     Pack years: 9.00     Types: Cigarettes    Smokeless tobacco: Never Used   Vaping Use    Vaping Use: Never used   Substance and Sexual Activity    Alcohol use: No     Alcohol/week: 0.0 standard drinks    Drug use: Never    Sexual activity: Not Currently     Partners: Male   Other Topics Concern    None   Social History Narrative    None     Social Determinants of Health     Financial Resource Strain:     Difficulty of Paying Living Expenses:    Food Insecurity:     Worried About Running Out of Food in the Last Year:     Ran Out of Food in the Last Year:    Transportation Needs:     Lack of Transportation (Medical):      Lack of Transportation (Non-Medical):    Physical Activity:     Days of Exercise per Week:     Minutes of Exercise per Session:    Stress:     Feeling of Stress :    Social Connections:     Frequency of Communication with Friends and Family:     Frequency of Social Gatherings with Friends and Family:     Attends [employer] Services:     Active Member of Clubs or Organizations:     Attends Club or Organization Meetings:     Marital Status:    Intimate Partner Violence:     Fear of Current or Ex-Partner:     Emotionally Abused:     Physically Abused:     Sexually Abused:      Family History   Problem Relation Age of Onset    High Blood Pressure Mother     Diabetes Mother     High Cholesterol Mother     Depression Mother     Diabetes Maternal Grandmother     High Blood Pressure Maternal Grandmother     High Cholesterol Maternal Grandmother     Heart Disease Maternal Grandfather     High Blood Pressure Maternal Grandfather     Heart Disease Paternal Grandmother     Stroke Paternal Grandfather     Depression Sister     Cirrhosis Father     Rheum Arthritis Neg Hx     Osteoarthritis Neg Hx     Asthma Neg Hx     Breast Cancer Neg Hx     Cancer Neg Hx     Heart Failure Neg Hx     Hypertension Neg Hx     Migraines Neg Hx     Ovarian Cancer Neg Hx     Rashes/Skin Problems Neg Hx     Seizures Neg Hx     Thyroid Disease Neg Hx        PHYSICAL EXAM    VITAL SIGNS: BP (!) 123/93   Pulse 104   Temp 99 °F (37.2 °C) (Oral)   Resp 14   Wt 160 lb 7.9 oz (72.8 kg)   LMP 10/31/2016 (Exact Date)   SpO2 97%   BMI 32.42 kg/m²   Constitutional:  Well developed, well nourished, no acute distress  Eyes:  Sclera nonicteric, conjunctiva moist  HENT:  Atraumatic, nose normal  Neck: no JVD  Respiratory:  No retractions, no accessory muscle use, normal breath sounds   Cardiovascular: regular rate, no murmurs  GI:  +RLQ abdominal tenderness to palpation around the surgical incision site. There is no purulent drainage, no surrounding erythema, surgical incision is well approximated with no clinical signs of surrounding infection or cellulitis. There is no drainage coming from the surgical incision site. Steri-Strips that were applied postoperatively are still intact.  Abdomen is soft, no guarding, bowel sounds present, no audible bruits or palpable pulsatile masses  Back: no CVA tenderness  Musculoskeletal:  No edema, no acute deformities  Vascular: DP pulses 2+ and equal bilaterally  Integument: No rashes, skin dry  Neurologic:  Alert & oriented, no slurred speech  Psychiatric: Cooperative, pleasant affect       LABS  Results for orders placed or performed during the hospital encounter of 07/21/21   CBC auto differential   Result Value Ref Range    WBC 14.8 (H) 4.0 - 11.0 K/uL    RBC 5.16 4.00 - 5.20 M/uL    Hemoglobin 15.2 12.0 - 16.0 g/dL    Hematocrit 44.0 36.0 - 48.0 %    MCV 85.4 80.0 - 100.0 fL    MCH 29.5 26.0 - 34.0 pg    MCHC 34.5 31.0 - 36.0 g/dL    RDW 15.1 12.4 - 15.4 %    Platelets 565 593 - 457 K/uL    MPV 7.5 5.0 - 10.5 fL    Neutrophils % 87.8 %    Lymphocytes % 10.5 %    Monocytes % 1.2 %    Eosinophils % 0.1 %    Basophils % 0.4 %    Neutrophils Absolute 13.0 (H) 1.7 - 7.7 K/uL    Lymphocytes Absolute 1.6 1.0 - 5.1 K/uL    Monocytes Absolute 0.2 0.0 - 1.3 K/uL    Eosinophils Absolute 0.0 0.0 - 0.6 K/uL    Basophils Absolute 0.1 0.0 - 0.2 K/uL   Comprehensive Metabolic Panel w/ Reflex to MG   Result Value Ref Range    Sodium 137 136 - 145 mmol/L    Potassium reflex Magnesium 4.5 3.5 - 5.1 mmol/L    Chloride 101 99 - 110 mmol/L    CO2 19 (L) 21 - 32 mmol/L    Anion Gap 17 (H) 3 - 16    Glucose 139 (H) 70 - 99 mg/dL    BUN 16 7 - 20 mg/dL    CREATININE 0.7 0.6 - 1.1 mg/dL    GFR Non-African American >60 >60    GFR African American >60 >60    Calcium 9.5 8.3 - 10.6 mg/dL    Total Protein 8.2 6.4 - 8.2 g/dL    Albumin 4.5 3.4 - 5.0 g/dL    Albumin/Globulin Ratio 1.2 1.1 - 2.2    Total Bilirubin 0.3 0.0 - 1.0 mg/dL    Alkaline Phosphatase 127 40 - 129 U/L    ALT 7 (L) 10 - 40 U/L    AST 13 (L) 15 - 37 U/L    Globulin 3.7 g/dL   Lactate, Sepsis   Result Value Ref Range    Lactic Acid, Sepsis 1.5 0.4 - 1.9 mmol/L   Lipase   Result Value Ref Range    Lipase 13.0 13.0 - 60.0 U/L          RADIOLOGY/PROCEDURES    CT ABDOMEN PELVIS W IV CONTRAST Additional Contrast? None   Final Result   1. Mild subcutaneous inflammatory stranding within the lower and anterior   abdominal wall likely due to cellulitis; correlate with direct inspection. ED COURSE & MEDICAL DECISION MAKING    Pertinent Labs & Imaging studies reviewed and interpreted.  (See chart for details) See chart for details of medications given during the ED stay. Vitals:    07/21/21 1820 07/21/21 1830 07/21/21 1845 07/21/21 1900   BP:  (!) 128/90 121/89 (!) 123/93   Pulse:    104   Resp:       Temp:       TempSrc:       SpO2:  94% 96% 97%   Weight: 160 lb 7.9 oz (72.8 kg)          Differential diagnosis: Inflammatory bowel disease, Ischemic Bowel, Bowel Obstruction, Appendicitis, Diverticulitis, Pyelonephritis, UTI, Ureterolithiasis, Colitis, Gonad Torsion, constipation, electrolyte derangement, other    CRITICAL CARE NOTE:  There was a high probability of clinically significant life-threatening deterioration of the patient's condition requiring my urgent intervention. Total critical care time was at least 10 minutes. This includes vital sign monitoring, pulse oximetry monitoring, telemetry monitoring, clinical response to the IV medications, reviewing the nursing notes, consultation time, dictation/documentation time, and interpretation of the labwork. This excludes any separately billable procedures performed. Patient is afebrile and nontoxic in appearance. She is originally slightly tachycardic. Resolved with IV fluids    Surgical incision site is well approximated, Steri-Strips are still intact that were applied postoperatively. There is no drainage, surrounding erythema or signs of infection externally around the incision site. There is no ecchymosis, abdomen is soft, especially with palpation around the incision site. Labs reveal a slight leukocytosis of 14.8. No anemia. No significant electrolyte derangements or ILEANA. No lactic acidosis. Lipase within normal limits. CT findings as above. Reevaluation: Patient is resting comfortably. Able to tolerate p.o. intake with Motrin. She will be given a course of doxycycline for the cellulitis and to follow-up with primary care or Dr. David Dominguez for wound check.  She remained afebrile and hemodynamically stable throughout her entire ED course and will be discharged home in stable condition. I estimate there is LOW risk for ACUTE APPENDICITIS, BOWEL OBSTRUCTION, CHOLECYSTITIS, DIVERTICULITIS, INCARCERATED HERNIA, PANCREATITIS, or PERFORATED BOWEL or ULCER, thus I consider the discharge disposition reasonable. Also, there is no evidence or peritonitis, sepsis, or toxicity. Angelina Flanagan and I have discussed the diagnosis and risks, and we agree with discharging home to follow-up with their primary doctor in 2-3 days. We also discussed returning to the Emergency Department immediately if new or worsening symptoms occur. We have discussed the symptoms which are most concerning (e.g., bloody stool, fever, changing or worsening pain, vomiting) that necessitate immediate return. Verbalized understanding, has no further questions or concerns and is in agreement with this plan as well as the plan of discharge. FINAL Impression    1. Cellulitis of abdominal wall    2. Nausea        Blood pressure (!) 123/93, pulse 104, temperature 99 °F (37.2 °C), temperature source Oral, resp. rate 14, weight 160 lb 7.9 oz (72.8 kg), last menstrual period 10/31/2016, SpO2 97 %, not currently breastfeeding.      PLAN  Discharge with outpatient follow-up    (Please note that this note was completed with a voice recognition program.  Every attempt was made to edit the dictations, but inevitably there remain words that are mis-transcribed.)         FREDRICK Euceda - AARON  07/21/21 2055

## 2021-07-22 ENCOUNTER — HOSPITAL ENCOUNTER (OUTPATIENT)
Age: 41
Setting detail: OBSERVATION
Discharge: HOME | End: 2021-07-24
Attending: SURGERY | Admitting: SURGERY
Payer: COMMERCIAL

## 2021-07-22 ENCOUNTER — APPOINTMENT (OUTPATIENT)
Dept: GENERAL RADIOLOGY | Age: 41
End: 2021-07-22
Payer: COMMERCIAL

## 2021-07-22 ENCOUNTER — OFFICE VISIT (OUTPATIENT)
Dept: PRIMARY CARE CLINIC | Age: 41
End: 2021-07-22
Payer: COMMERCIAL

## 2021-07-22 ENCOUNTER — TELEPHONE (OUTPATIENT)
Dept: SURGERY | Age: 41
End: 2021-07-22

## 2021-07-22 VITALS
SYSTOLIC BLOOD PRESSURE: 112 MMHG | BODY MASS INDEX: 32.25 KG/M2 | OXYGEN SATURATION: 98 % | WEIGHT: 160 LBS | RESPIRATION RATE: 16 BRPM | HEART RATE: 127 BPM | HEIGHT: 59 IN | TEMPERATURE: 98.9 F | DIASTOLIC BLOOD PRESSURE: 68 MMHG

## 2021-07-22 DIAGNOSIS — L03.818 CELLULITIS OF OTHER SPECIFIED SITE: Primary | ICD-10-CM

## 2021-07-22 DIAGNOSIS — R22.2 ABDOMINAL WALL MASS: ICD-10-CM

## 2021-07-22 DIAGNOSIS — R11.2 INTRACTABLE VOMITING WITH NAUSEA: ICD-10-CM

## 2021-07-22 DIAGNOSIS — L03.311 CELLULITIS OF ABDOMINAL WALL: Primary | ICD-10-CM

## 2021-07-22 LAB
A/G RATIO: 1.3 (ref 1.1–2.2)
ALBUMIN SERPL-MCNC: 4.6 G/DL (ref 3.4–5)
ALP BLD-CCNC: 161 U/L (ref 40–129)
ALT SERPL-CCNC: 68 U/L (ref 10–40)
ANION GAP SERPL CALCULATED.3IONS-SCNC: 15 MMOL/L (ref 3–16)
AST SERPL-CCNC: 39 U/L (ref 15–37)
BASOPHILS ABSOLUTE: 0.1 K/UL (ref 0–0.2)
BASOPHILS RELATIVE PERCENT: 0.3 %
BILIRUB SERPL-MCNC: 0.3 MG/DL (ref 0–1)
BILIRUBIN URINE: NEGATIVE
BLOOD, URINE: NEGATIVE
BUN BLDV-MCNC: 14 MG/DL (ref 7–20)
CALCIUM SERPL-MCNC: 9.6 MG/DL (ref 8.3–10.6)
CHLORIDE BLD-SCNC: 101 MMOL/L (ref 99–110)
CLARITY: CLEAR
CO2: 22 MMOL/L (ref 21–32)
COLOR: YELLOW
CREAT SERPL-MCNC: 0.7 MG/DL (ref 0.6–1.1)
EOSINOPHILS ABSOLUTE: 0 K/UL (ref 0–0.6)
EOSINOPHILS RELATIVE PERCENT: 0 %
GFR AFRICAN AMERICAN: >60
GFR NON-AFRICAN AMERICAN: >60
GLOBULIN: 3.6 G/DL
GLUCOSE BLD-MCNC: 155 MG/DL (ref 70–99)
GLUCOSE URINE: NEGATIVE MG/DL
HCT VFR BLD CALC: 41.8 % (ref 36–48)
HEMOGLOBIN: 14.5 G/DL (ref 12–16)
KETONES, URINE: NEGATIVE MG/DL
LACTIC ACID: 2.3 MMOL/L (ref 0.4–2)
LEUKOCYTE ESTERASE, URINE: NEGATIVE
LIPASE: 13 U/L (ref 13–60)
LYMPHOCYTES ABSOLUTE: 1.3 K/UL (ref 1–5.1)
LYMPHOCYTES RELATIVE PERCENT: 8.9 %
MCH RBC QN AUTO: 29.5 PG (ref 26–34)
MCHC RBC AUTO-ENTMCNC: 34.6 G/DL (ref 31–36)
MCV RBC AUTO: 85 FL (ref 80–100)
MICROSCOPIC EXAMINATION: NORMAL
MONOCYTES ABSOLUTE: 0.2 K/UL (ref 0–1.3)
MONOCYTES RELATIVE PERCENT: 1.5 %
NEUTROPHILS ABSOLUTE: 13.5 K/UL (ref 1.7–7.7)
NEUTROPHILS RELATIVE PERCENT: 89.3 %
NITRITE, URINE: NEGATIVE
PDW BLD-RTO: 15.4 % (ref 12.4–15.4)
PH UA: 6 (ref 5–8)
PLATELET # BLD: 310 K/UL (ref 135–450)
PMV BLD AUTO: 7.6 FL (ref 5–10.5)
POTASSIUM REFLEX MAGNESIUM: 3.8 MMOL/L (ref 3.5–5.1)
PROTEIN UA: NEGATIVE MG/DL
RBC # BLD: 4.92 M/UL (ref 4–5.2)
SODIUM BLD-SCNC: 138 MMOL/L (ref 136–145)
SPECIFIC GRAVITY UA: 1.01 (ref 1–1.03)
TOTAL PROTEIN: 8.2 G/DL (ref 6.4–8.2)
URINE REFLEX TO CULTURE: NORMAL
URINE TYPE: NORMAL
UROBILINOGEN, URINE: 0.2 E.U./DL
WBC # BLD: 15.1 K/UL (ref 4–11)

## 2021-07-22 PROCEDURE — 6360000002 HC RX W HCPCS: Performed by: NURSE PRACTITIONER

## 2021-07-22 PROCEDURE — G8417 CALC BMI ABV UP PARAM F/U: HCPCS | Performed by: NURSE PRACTITIONER

## 2021-07-22 PROCEDURE — 81003 URINALYSIS AUTO W/O SCOPE: CPT

## 2021-07-22 PROCEDURE — G0378 HOSPITAL OBSERVATION PER HR: HCPCS

## 2021-07-22 PROCEDURE — 2500000003 HC RX 250 WO HCPCS: Performed by: SURGERY

## 2021-07-22 PROCEDURE — 6370000000 HC RX 637 (ALT 250 FOR IP): Performed by: NURSE PRACTITIONER

## 2021-07-22 PROCEDURE — 71045 X-RAY EXAM CHEST 1 VIEW: CPT

## 2021-07-22 PROCEDURE — 2500000003 HC RX 250 WO HCPCS: Performed by: NURSE PRACTITIONER

## 2021-07-22 PROCEDURE — 99283 EMERGENCY DEPT VISIT LOW MDM: CPT

## 2021-07-22 PROCEDURE — 99214 OFFICE O/P EST MOD 30 MIN: CPT | Performed by: NURSE PRACTITIONER

## 2021-07-22 PROCEDURE — 2580000003 HC RX 258: Performed by: SURGERY

## 2021-07-22 PROCEDURE — 83690 ASSAY OF LIPASE: CPT

## 2021-07-22 PROCEDURE — 2580000003 HC RX 258: Performed by: NURSE PRACTITIONER

## 2021-07-22 PROCEDURE — 6360000002 HC RX W HCPCS: Performed by: SURGERY

## 2021-07-22 PROCEDURE — 4004F PT TOBACCO SCREEN RCVD TLK: CPT | Performed by: NURSE PRACTITIONER

## 2021-07-22 PROCEDURE — 83605 ASSAY OF LACTIC ACID: CPT

## 2021-07-22 PROCEDURE — 85025 COMPLETE CBC W/AUTO DIFF WBC: CPT

## 2021-07-22 PROCEDURE — G8427 DOCREV CUR MEDS BY ELIG CLIN: HCPCS | Performed by: NURSE PRACTITIONER

## 2021-07-22 PROCEDURE — 80053 COMPREHEN METABOLIC PANEL: CPT

## 2021-07-22 RX ORDER — HYDROCODONE BITARTRATE AND ACETAMINOPHEN 5; 325 MG/1; MG/1
2 TABLET ORAL EVERY 4 HOURS PRN
Status: DISCONTINUED | OUTPATIENT
Start: 2021-07-22 | End: 2021-07-24 | Stop reason: HOSPADM

## 2021-07-22 RX ORDER — POLYETHYLENE GLYCOL 3350 17 G/17G
17 POWDER, FOR SOLUTION ORAL DAILY PRN
Status: DISCONTINUED | OUTPATIENT
Start: 2021-07-22 | End: 2021-07-24 | Stop reason: HOSPADM

## 2021-07-22 RX ORDER — SODIUM CHLORIDE 9 MG/ML
INJECTION, SOLUTION INTRAVENOUS CONTINUOUS
Status: DISCONTINUED | OUTPATIENT
Start: 2021-07-22 | End: 2021-07-24 | Stop reason: HOSPADM

## 2021-07-22 RX ORDER — ONDANSETRON 4 MG/1
4 TABLET, ORALLY DISINTEGRATING ORAL EVERY 8 HOURS PRN
Status: DISCONTINUED | OUTPATIENT
Start: 2021-07-22 | End: 2021-07-24 | Stop reason: HOSPADM

## 2021-07-22 RX ORDER — SODIUM CHLORIDE 9 MG/ML
25 INJECTION, SOLUTION INTRAVENOUS PRN
Status: DISCONTINUED | OUTPATIENT
Start: 2021-07-22 | End: 2021-07-24 | Stop reason: HOSPADM

## 2021-07-22 RX ORDER — CLINDAMYCIN PHOSPHATE 600 MG/50ML
600 INJECTION INTRAVENOUS EVERY 8 HOURS
Status: DISCONTINUED | OUTPATIENT
Start: 2021-07-22 | End: 2021-07-24 | Stop reason: HOSPADM

## 2021-07-22 RX ORDER — ACETAMINOPHEN 650 MG/1
650 SUPPOSITORY RECTAL EVERY 6 HOURS PRN
Status: DISCONTINUED | OUTPATIENT
Start: 2021-07-22 | End: 2021-07-24 | Stop reason: HOSPADM

## 2021-07-22 RX ORDER — 0.9 % SODIUM CHLORIDE 0.9 %
30 INTRAVENOUS SOLUTION INTRAVENOUS ONCE
Status: COMPLETED | OUTPATIENT
Start: 2021-07-22 | End: 2021-07-22

## 2021-07-22 RX ORDER — HYDROCODONE BITARTRATE AND ACETAMINOPHEN 5; 325 MG/1; MG/1
1 TABLET ORAL EVERY 4 HOURS PRN
Status: DISCONTINUED | OUTPATIENT
Start: 2021-07-22 | End: 2021-07-24 | Stop reason: HOSPADM

## 2021-07-22 RX ORDER — SODIUM CHLORIDE 0.9 % (FLUSH) 0.9 %
5-40 SYRINGE (ML) INJECTION EVERY 12 HOURS SCHEDULED
Status: DISCONTINUED | OUTPATIENT
Start: 2021-07-22 | End: 2021-07-24 | Stop reason: HOSPADM

## 2021-07-22 RX ORDER — OXYCODONE HYDROCHLORIDE 10 MG/1
10 TABLET ORAL ONCE
Status: COMPLETED | OUTPATIENT
Start: 2021-07-22 | End: 2021-07-22

## 2021-07-22 RX ORDER — MORPHINE SULFATE 4 MG/ML
4 INJECTION, SOLUTION INTRAMUSCULAR; INTRAVENOUS
Status: DISCONTINUED | OUTPATIENT
Start: 2021-07-22 | End: 2021-07-24 | Stop reason: HOSPADM

## 2021-07-22 RX ORDER — ACETAMINOPHEN 325 MG/1
650 TABLET ORAL EVERY 6 HOURS PRN
Status: DISCONTINUED | OUTPATIENT
Start: 2021-07-22 | End: 2021-07-24 | Stop reason: HOSPADM

## 2021-07-22 RX ORDER — SODIUM CHLORIDE 0.9 % (FLUSH) 0.9 %
5-40 SYRINGE (ML) INJECTION PRN
Status: DISCONTINUED | OUTPATIENT
Start: 2021-07-22 | End: 2021-07-24 | Stop reason: HOSPADM

## 2021-07-22 RX ORDER — ONDANSETRON 2 MG/ML
4 INJECTION INTRAMUSCULAR; INTRAVENOUS EVERY 6 HOURS PRN
Status: DISCONTINUED | OUTPATIENT
Start: 2021-07-22 | End: 2021-07-24 | Stop reason: HOSPADM

## 2021-07-22 RX ORDER — TERBINAFINE HYDROCHLORIDE 250 MG/1
250 TABLET ORAL DAILY
Status: DISCONTINUED | OUTPATIENT
Start: 2021-07-23 | End: 2021-07-24 | Stop reason: HOSPADM

## 2021-07-22 RX ORDER — MORPHINE SULFATE 2 MG/ML
2 INJECTION, SOLUTION INTRAMUSCULAR; INTRAVENOUS
Status: DISCONTINUED | OUTPATIENT
Start: 2021-07-22 | End: 2021-07-24 | Stop reason: HOSPADM

## 2021-07-22 RX ADMIN — OXYCODONE HYDROCHLORIDE 10 MG: 10 TABLET ORAL at 18:10

## 2021-07-22 RX ADMIN — CLINDAMYCIN PHOSPHATE 600 MG: 600 INJECTION, SOLUTION INTRAVENOUS at 21:35

## 2021-07-22 RX ADMIN — SODIUM CHLORIDE: 9 INJECTION, SOLUTION INTRAVENOUS at 21:36

## 2021-07-22 RX ADMIN — MORPHINE SULFATE 4 MG: 4 INJECTION, SOLUTION INTRAMUSCULAR; INTRAVENOUS at 21:29

## 2021-07-22 RX ADMIN — Medication 25 MG: at 18:10

## 2021-07-22 RX ADMIN — METRONIDAZOLE 500 MG: 500 INJECTION, SOLUTION INTRAVENOUS at 19:32

## 2021-07-22 RX ADMIN — SODIUM CHLORIDE 2193 ML: 9 INJECTION, SOLUTION INTRAVENOUS at 18:10

## 2021-07-22 RX ADMIN — CEFEPIME HYDROCHLORIDE 2000 MG: 2 INJECTION, POWDER, FOR SOLUTION INTRAVENOUS at 18:10

## 2021-07-22 RX ADMIN — ONDANSETRON 4 MG: 2 INJECTION INTRAMUSCULAR; INTRAVENOUS at 21:31

## 2021-07-22 SDOH — ECONOMIC STABILITY: FOOD INSECURITY: WITHIN THE PAST 12 MONTHS, THE FOOD YOU BOUGHT JUST DIDN'T LAST AND YOU DIDN'T HAVE MONEY TO GET MORE.: NEVER TRUE

## 2021-07-22 SDOH — ECONOMIC STABILITY: FOOD INSECURITY: WITHIN THE PAST 12 MONTHS, YOU WORRIED THAT YOUR FOOD WOULD RUN OUT BEFORE YOU GOT MONEY TO BUY MORE.: NEVER TRUE

## 2021-07-22 ASSESSMENT — ENCOUNTER SYMPTOMS
CONSTIPATION: 0
COLOR CHANGE: 0
ABDOMINAL DISTENTION: 0
ABDOMINAL PAIN: 1
VOMITING: 1
BLOOD IN STOOL: 0
SHORTNESS OF BREATH: 0
COUGH: 0
DIARRHEA: 0
NAUSEA: 1

## 2021-07-22 ASSESSMENT — PAIN DESCRIPTION - PROGRESSION
CLINICAL_PROGRESSION: NOT CHANGED

## 2021-07-22 ASSESSMENT — PATIENT HEALTH QUESTIONNAIRE - PHQ9
2. FEELING DOWN, DEPRESSED OR HOPELESS: 0
SUM OF ALL RESPONSES TO PHQ9 QUESTIONS 1 & 2: 0
1. LITTLE INTEREST OR PLEASURE IN DOING THINGS: 0
SUM OF ALL RESPONSES TO PHQ QUESTIONS 1-9: 0

## 2021-07-22 ASSESSMENT — PAIN DESCRIPTION - FREQUENCY
FREQUENCY: CONTINUOUS

## 2021-07-22 ASSESSMENT — PAIN DESCRIPTION - PAIN TYPE
TYPE: ACUTE PAIN

## 2021-07-22 ASSESSMENT — PAIN DESCRIPTION - ONSET
ONSET: GRADUAL

## 2021-07-22 ASSESSMENT — PAIN DESCRIPTION - ORIENTATION
ORIENTATION: RIGHT

## 2021-07-22 ASSESSMENT — PAIN SCALES - GENERAL
PAINLEVEL_OUTOF10: 6
PAINLEVEL_OUTOF10: 8
PAINLEVEL_OUTOF10: 8
PAINLEVEL_OUTOF10: 5

## 2021-07-22 ASSESSMENT — PAIN DESCRIPTION - DESCRIPTORS
DESCRIPTORS: SORE

## 2021-07-22 ASSESSMENT — PAIN DESCRIPTION - LOCATION
LOCATION: ABDOMEN

## 2021-07-22 ASSESSMENT — SOCIAL DETERMINANTS OF HEALTH (SDOH): HOW HARD IS IT FOR YOU TO PAY FOR THE VERY BASICS LIKE FOOD, HOUSING, MEDICAL CARE, AND HEATING?: NOT HARD AT ALL

## 2021-07-22 NOTE — PROGRESS NOTES
Medication Reconciliation     List of medications patient is currently taking is complete. Source of information:   1. Conversation with patient at bedside  2. EPIC records        Notes regarding home medications:  1. Patient received all of her home medications yesterday, but reports not being able to keep anything down for a few days. 2. Patient is on Vibra-tabs 100mg BID x 7 days for cellulitis in her stomach. 3. Patient reports she has discussed with her MD to take Ativan 0.5mg PRN - but patient stated she does not have script. 4. Patient stated that whenever she takes Acetaminophen she gets really bad anxiety - added this to allergy list.   5. Patient stated she no longer takes metformin - reports she was taking for pre-diabetes however discussed with her MD to dc metformin and focus on diet + exercise. Denies any other OTC/herbal medications.       Merly Dialloer, Pharmacy Intern

## 2021-07-22 NOTE — TELEPHONE ENCOUNTER
Patient is S/P Abdominal Wall Mass Removal 7/14. Went to [facility] ED last night. Found to have Cellulitis on CT Scan. Sent home with Zofran and Phenergan. Vomiting continuously.   Has an appointment with PCP today 7/22/2021 @ 1:45 PM.

## 2021-07-22 NOTE — PATIENT INSTRUCTIONS
Mild Dehydration-Can use any kind of fluids   After each loose stool, children <3years old give  mL, 310 years old give 100-200 mL, >11 can have as much as they want    Oral Rehydration Solution (ORS) -Homemade  1/2 teaspoon of salt and 6 teaspoons of sugar in 1 liter (34 ounces) of water. Moderate Dehydration  Children younger than two years should be given 1 teaspoon every one to two minutes; older children should be encouraged to take frequent sips directly from the cup. If the child vomits, wait 5-10 minutes and then start offering the oral rehydration solution again, but more slowly.

## 2021-07-22 NOTE — ED NOTES
Bed: D-48  Expected date: 7/22/21  Expected time: 4:50 PM  Means of arrival:   Comments:  Andrea Marie RN  07/22/21 6087

## 2021-07-22 NOTE — PROGRESS NOTES
Kelley Patton (:  1980) is a 47 y.o. female,Established patient, here for evaluation of the following chief complaint(s):  Follow-Up from Hospital and Eye Problem    Pt stating that Got home from ER at 3 am and started vomiting. She took a phenergan suppository and tylenol suppository at that time for a 103 fever. She thinks her fever came down, but has not been able to keep any drink or food down. States she took one doxycycline and / norco and not sure if they stayed down or came back up. Has urinated x2 since 3 am. She states she just wants to go to the ER as she always gets sick with abx and she always has to be admitted for IV abx and fluids. ASSESSMENT/PLAN:  1. Cellulitis of other specified site  2. Intractable vomiting with nausea      Return if symptoms worsen or fail to improve.     -Long discussion with pt, discussed taking suppository phenergan and starting with ORS to maintain hydration and keeping antibiotics down and to monitor UOP. Discussed pedialyte and avoiding carbonation. Pt reports she doesn't want to wait and that she knows she will end up in the ER and she has ride issues, so she just wants to go now. Discussed that if not improving, would recommend ER for fluids and IV abx. Subjective    SUBJECTIVE/OBJECTIVE:    Review of Systems   Constitutional: Positive for activity change, appetite change, diaphoresis and fever. Negative for chills and fatigue. Respiratory: Positive for shortness of breath. Negative for apnea, cough, choking, chest tightness, wheezing and stridor. Cardiovascular: Negative for chest pain and leg swelling. Gastrointestinal: Positive for abdominal pain, nausea and vomiting. Negative for abdominal distention, anal bleeding, blood in stool, constipation, diarrhea and rectal pain. Neurological: Positive for headaches. Negative for dizziness and weakness. Psychiatric/Behavioral: The patient is nervous/anxious.            Objective   Physical Exam  Vitals and nursing note reviewed. Constitutional:       Appearance: She is well-developed and well-groomed. She is ill-appearing. Cardiovascular:      Rate and Rhythm: Regular rhythm. Tachycardia present. Pulses: Normal pulses. Heart sounds: Normal heart sounds, S1 normal and S2 normal.   Pulmonary:      Effort: Pulmonary effort is normal.      Breath sounds: Normal breath sounds and air entry. Abdominal:      General: Abdomen is flat. Bowel sounds are normal.      Palpations: Abdomen is soft. Tenderness: There is abdominal tenderness in the right upper quadrant, right lower quadrant, suprapubic area and left lower quadrant. There is no right CVA tenderness, left CVA tenderness, guarding or rebound. Negative signs include Blue's sign, Rovsing's sign, McBurney's sign, psoas sign and obturator sign. Neurological:      Mental Status: She is alert. Psychiatric:         Behavior: Behavior is cooperative. On this date 7/22/2021 I have spent 30 minutes reviewing previous notes, test results and face to face with the patient discussing the diagnosis and importance of compliance with the treatment plan as well as documenting on the day of the visit. An electronic signature was used to authenticate this note.     --Lisa Ku, APRN - CNP

## 2021-07-22 NOTE — ED NOTES
ED SBAR report provider to SHANA renner. Patient to be transported to Room 4132 via stretcher by transport tech. Patient transported with bedside cardiac monitor and with IV medications infusing. IV site clean, dry, and intact. MEWS score and pain assessed as 0 and documented. Updated patient on plan of care.        Humberto Lancaster RN  07/22/21 1950

## 2021-07-22 NOTE — ED PROVIDER NOTES
**ADVANCED PRACTICE PROVIDER, I HAVE EVALUATED THIS PATIENT**        [facility] ENCOUNTER      Pt Name: Shade ARCHER:[id number]  [place] 1980  Date of evaluation: 7/22/2021  Provider: FREDRICK Galvez CNP      Chief Complaint:    Chief Complaint   Patient presents with    Emesis     states emesis , states took her phenergan still vomiting, she is currently on abx for cellulitis was seen yesterday at [facility]          Nursing Notes, Past Medical Hx, Past Surgical Hx, Social Hx, Allergies, and Family Hx were all reviewed and agreed with or any disagreements were addressed in the HPI.    HPI: (Location, Duration, Timing, Severity, Quality, Assoc Sx, Context, Modifying factors)    Chief Complaint of n/v    This is a  47 y.o. female who presents to the emergency department today complaining of vomiting and fever. In brief history patient had a lipoma removed from her right lower abdominal wall 8 days ago by Dr. Shannon Martin. She advised that she was feeling good up until 2 days ago which time she started having increased pain in the area with fevers. She was seen in the ER yesterday and started on antibiotics, but she advised that she has had emesis all night and day today, so was told by her PCP to come back in for admission for IV antibiotics. Temperatures was 103 °F at home. She had a normal bowel movement yesterday. PastMedical/Surgical History:      Diagnosis Date    ADHD (attention deficit hyperactivity disorder) 82    Depression with anxiety     Diabetes mellitus ([facility] [address]     pre-diabetes    Difficult intubation     Encounter for imaging to screen for metal prior to MRI 06/01/2021    MRI Conditional Medtronic Non-Programmable shunt model#[id number] implanted 10/30/2020 at [facility]. Normal Mode.  1.5T or 3.0T.    ESBL (extended spectrum beta-lactamase) producing bacteria infection 11/06/2019    urine    Functional ovarian cysts 2008    rt ovary cyst x 2 yrs.  Headache(784.0)     migraines    History of blood transfusion     at birth   [facility] History of kidney stones     History of PCOS     Hydrocephalus ([address]     Hyperlipidemia     Irritable bowel syndrome 2004    had colonoscopy about 6 yrs ago    Meningitis     Neutrophilic leukocytosis     Nicotine dependence     PONV (postoperative nausea and vomiting)     very nauseated and sometimes wakes up with a Migraine--happened once after brain surgery    Primary osteoarthritis of left knee 2016    S/P cone biopsy of cervix     Scoliosis .s     (ventriculoperitoneal) shunt status     hydrocephalus f/w Neurosurgeon at [facility]     (ventriculoperitoneal) shunt status     Wears glasses     reading         Procedure Laterality Date    ABDOMEN SURGERY N/A 2021    REMOVAL OF ABDOMINAL WALL MASS performed by Irma Chen MD at [address] [place] [address]      appendicitis     SECTION      placenta previa    CHOLECYSTECTOMY      COLONOSCOPY  2004    COLPOSCOPY      CSF SHUNT      replaced at age 17   [facility] CYSTOSCOPY      stone removal    HEMORRHOID SURGERY      HERNIA REPAIR Bilateral     inguinal    HERNIA REPAIR      hiatal hernia    HYSTERECTOMY, TOTAL ABDOMINAL  2016    TAHBSO, adhesions/PCOS    KNEE ARTHROSCOPY Left 2015    medial meniscectomy, chondroplasty, plica resection    LITHOTRIPSY Bilateral 12/10/2019    OTHER SURGICAL HISTORY  10/19/2016    op lap    VENTRICULOPERITONEAL SHUNT      multiple revisions, most recent        Medications:  Previous Medications    DOXYCYCLINE HYCLATE (VIBRA-TABS) 100 MG TABLET    Take 1 tablet by mouth 2 times daily for 7 days    METFORMIN (GLUCOPHAGE) 500 MG TABLET    Take 1 tablet by mouth daily (with breakfast)    MUPIROCIN (BACTROBAN) 2 % OINTMENT    Apply 3 times daily.     ONDANSETRON (ZOFRAN ODT) 4 MG DISINTEGRATING TABLET    Take 1-2 tablets by mouth every 12 hours as needed for Nausea May Sub regular tablet (non-ODT) if insurance does not cover ODT. PROMETHAZINE (PHENERGAN) 25 MG TABLET    Take 1 tablet by mouth every 6 hours as needed for Nausea WARNING:  May cause drowsiness. May impair ability to operate vehicles or machinery. Do not use in combination with alcohol. PROMETHAZINE (PROMETHEGAN) 25 MG SUPPOSITORY    Place 1 suppository rectally every 6 hours as needed for Nausea (or vomiting)    SIMVASTATIN (ZOCOR) 10 MG TABLET    Take 1 tablet by mouth nightly    SUMATRIPTAN (IMITREX) 100 MG TABLET    Take 1 tablet by mouth once as needed for Migraine    TERBINAFINE (LAMISIL) 250 MG TABLET    Take 1 tablet by mouth daily    TRIAMCINOLONE (KENALOG) 0.025 % OINTMENT    Apply topically 2 times daily. Review of Systems:  (2-9 systems needed)  Review of Systems   Constitutional: Positive for fever. Negative for chills and diaphoresis. Respiratory: Negative for cough and shortness of breath. Cardiovascular: Negative for chest pain. Gastrointestinal: Positive for abdominal pain (post-op pain), nausea and vomiting. Negative for abdominal distention, blood in stool, constipation and diarrhea. Genitourinary: Negative for dysuria, flank pain, frequency and hematuria. Skin: Positive for wound (post-op wound). Negative for color change and rash. Allergic/Immunologic: Negative for immunocompromised state. Neurological: Negative for dizziness and headaches. Hematological: Negative for adenopathy. Psychiatric/Behavioral: Negative for confusion. All other systems reviewed and are negative. \"Positives and Pertinent negatives as per HPI\"    Physical Exam:  Physical Exam  Vitals and nursing note reviewed. Constitutional:       General: She is not in acute distress. Appearance: Normal appearance. She is well-developed. She is not toxic-appearing. HENT:      Head: Normocephalic and atraumatic.    Eyes:      General: No scleral icterus. Conjunctiva/sclera: Conjunctivae normal.   Neck:      Vascular: No JVD. Cardiovascular:      Rate and Rhythm: Regular rhythm. Tachycardia present. Heart sounds: Normal heart sounds. Pulmonary:      Effort: Pulmonary effort is normal. No respiratory distress. Breath sounds: Normal breath sounds. Abdominal:      General: There is no distension. Palpations: Abdomen is soft. Abdomen is not rigid. Tenderness: There is abdominal tenderness. There is no rebound. Comments: Well-appearing surgical incision to the right lower abdominal wall with no surrounding redness or warmth or other signs of infection. Musculoskeletal:         General: Normal range of motion. Cervical back: Normal range of motion and neck supple. Skin:     General: Skin is warm and dry. Capillary Refill: Capillary refill takes less than 2 seconds. Findings: No rash. Neurological:      General: No focal deficit present. Mental Status: She is alert and oriented to person, place, and time.    Psychiatric:         Mood and Affect: Mood normal.         MEDICAL DECISION MAKING    Vitals:    Vitals:    07/22/21 1602 07/22/21 1746   BP: (!) 139/94 132/78   Pulse: 118    Resp: 14    Temp: 99.1 °F (37.3 °C)    TempSrc: Oral    SpO2: 100% 100%   Weight: 161 lb 2.5 oz (73.1 kg)        LABS:  Labs Reviewed   CBC WITH AUTO DIFFERENTIAL - Abnormal; Notable for the following components:       Result Value    WBC 15.1 (*)     Neutrophils Absolute 13.5 (*)     All other components within normal limits    Narrative:     Performed at:  [address] [address]   Phone [phone]   COMPREHENSIVE METABOLIC PANEL W/ REFLEX TO MG FOR LOW K - Abnormal; Notable for the following components:    Glucose 155 (*)     Alkaline Phosphatase 161 (*)     ALT 68 (*)     AST 39 (*)     All other components within normal limits    Narrative:     Performed at:  [facility]  [address] [place] [facility] [address]   Phone [phone]   LACTIC ACID, PLASMA - Abnormal; Notable for the following components:    Lactic Acid 2.3 (*)     All other components within normal limits    Narrative:     Performed at:  [facility]  [address] [place] [address]   Phone [phone]   LIPASE    Narrative:     Performed at:  [facility]  [address]  [place] [facility] [address]   Phone [phone]   URINE RT REFLEX TO CULTURE        Remainder of labs reviewed and were negative at this time or not returned at the time of this note. RADIOLOGY:   Non-plain film images such as CT, Ultrasound and MRI are read by the radiologist. FREDRICK Huynh CNP have directly visualized the radiologic plain film image(s) with the below findings:      Interpretation per the Radiologist below, if available at the time of this note:    XR CHEST PORTABLE   Final Result   No acute abnormality              CT ABDOMEN PELVIS WO CONTRAST Additional Contrast? None    Result Date: 7/2/2021  EXAMINATION: CT OF THE ABDOMEN AND PELVIS WITHOUT CONTRAST 7/2/2021 4:21 pm TECHNIQUE: CT of the abdomen and pelvis was performed without the administration of intravenous contrast. Multiplanar reformatted images are provided for review. Dose modulation, iterative reconstruction, and/or weight based adjustment of the mA/kV was utilized to reduce the radiation dose to as low as reasonably achievable.  COMPARISON: 07/09/2020 HISTORY: ORDERING SYSTEM PROVIDED HISTORY: pain a  shunt side in abdomen per patient, tenderness periumbilically on exam TECHNOLOGIST PROVIDED HISTORY: Reason for exam:->pain a  shunt side in abdomen per patient, tenderness periumbilically on exam Additional Contrast?->None Decision Support Exception - unselect if not a suspected or confirmed emergency medical condition->Emergency Medical Condition (MA) Is the patient pregnant?->No Reason for Exam: pain a  shunt side in abdomen per patient, tenderness periumbilically on exam Acuity: Acute Type of Exam: Initial Relevant Medical/Surgical History: tameka kwon, FINDINGS: There is a right ventriculoperitoneal catheter, with entry into the peritoneal cavity in the right subcostal region. It has a tortuous course, terminating in the anterior aspect of the right lower quadrant. No breakage is identified. Lower Chest: The lung bases are clear. The visualized heart is unremarkable. Organs: The liver is homogeneous and normal in attenuation. There is post cholecystectomy prominence of the common duct. The pancreas, spleen and adrenal glands are unremarkable. The kidneys are symmetrical in size. There is a nonobstructing 2 mm calculus in the inferior pole of the right kidney. GI/Bowel: The stomach and small bowel are unremarkable. The appendix is normal.  There is no focal mural thickening of the colon appreciated. There is mild diverticulosis of the sigmoid colon. No pericolonic edema is visualized. Pelvis: Urinary bladder is unremarkable. Uterus is absent. Peritoneum/Retroperitoneum: There is mild aortic calcification, without aneurysm. No adenopathy is seen. Bones/Soft Tissues: No acute osseous abnormality is identified. Soft tissues of the abdominal wall are otherwise unremarkable. The ventricular peritoneal catheter has a normal course, terminating in the right lower quadrant. No localized fluid collection is identified in the peritoneal cavity. No acute inflammatory abnormality of the abdomen or pelvis. Nonobstructive right nephrolithiasis. US ABDOMEN COMPLETE    Result Date: 6/28/2021  EXAMINATION: COMPLETE ABDOMINAL ULTRASOUND 6/28/2021 3:12 pm COMPARISON: None.  HISTORY: ORDERING SYSTEM PROVIDED HISTORY: Lower abdominal pain TECHNOLOGIST PROVIDED HISTORY: Reason for exam:->4 cm tender mass palpated under umbilicus above suprapubic bone, generalized abdominal pain and swelling over left upper quadrant FINDINGS: LIVER: The liver demonstrates normal echogenicity without evidence of intrahepatic biliary ductal dilatation. BILIARY SYSTEM: The patient is status post cholecystectomy. Common bile duct is within normal limits measuring 0.96 cm. KIDNEYS: The kidneys are unremarkable in appearance without evidence of hydronephrosis. PANCREAS: Visualized portions of the pancreas are unremarkable. SPLEEN: The spleen is unremarkable in appearance. Spleen is within normal limits in size measuring up to 10.7 cm. IVC: The IVC is patent. AORTA: Aorta is patent without aneurysm measuring up to 2.2 cm. OTHER: No evidence of ascites. The palpable area of concern was located at the  scar and there was a heterogeneous echogenic area measuring 0.9 x 1.2 x 0.8 cm. This may reflect a lymph node versus is area of scar. Palpable area of concern was located at the  scar and there was a heterogeneous echogenic area measuring 0.9 x 1.2 x 4.4 cm. This may reflect an area of fat necrosis. CT ABDOMEN PELVIS W IV CONTRAST Additional Contrast? None    Result Date: 2021  EXAMINATION: CT OF THE ABDOMEN AND PELVIS WITH CONTRAST 2021 7:26 pm TECHNIQUE: CT of the abdomen and pelvis was performed with the administration of intravenous contrast. Multiplanar reformatted images are provided for review. Dose modulation, iterative reconstruction, and/or weight based adjustment of the mA/kV was utilized to reduce the radiation dose to as low as reasonably achievable.  COMPARISON: 2021 HISTORY: ORDERING SYSTEM PROVIDED HISTORY: pain around RLQ surgical incision site, fevers TECHNOLOGIST PROVIDED HISTORY: Reason for exam:->pain around RLQ surgical incision site, fevers Additional Contrast?->None Decision Support Exception - unselect if not a suspected or confirmed emergency medical condition->Emergency Medical Condition (MA) Reason for Exam: Pain around RLQ surgical incision site, fevers Acuity: Acute Type of Exam: Initial Additional signs and symptoms: Post-op Problem (RLQ pain around surgical site) Relevant Medical/Surgical History: Current Some Day Smoker, 0.5 ppd, 9 pack-years. Hx of scolosis, prediabetes, ureteritis, acute cystitis WO hematuria, urinary retention, UTI, renal colic, abdominal wall mass, abdominal surgery to remove abdominal wall mass (2021), lithotripsy (bilateral) (12/10/2019), hysterectomy (2016), hiatal hernia repair (),  section (), appendectomy (), inguinal hernia repair (), cholecystectomy, CSF shunt, venticuloperitoneal shunt (multiple revisions, last on is .)  No hx of any cancer. FINDINGS: Lower Chest: Hyper lucency within the right lower lobe is likely due to air trapping. Organs: The liver, spleen, pancreas, adrenal glands and kidneys are unremarkable. Status post cholecystectomy with mild to moderate intrahepatic ductal dilatation. GI/Bowel: There is no bowel obstruction. The appendix is within normal limits. Pelvis: Status post hysterectomy. Peritoneum/Retroperitoneum: There is no evidence of free fluid or adenopathy. A right ventriculoperitoneal shunt catheter terminates in the left lower quadrant. Bones/Soft Tissues: Degenerative changes involve the thoracolumbar spine and bilateral sacroiliac joints. Within the right paracentral lower anterior abdominal wall, there is mild subcutaneous inflammatory stranding with associated skin thickening. There is no drainable fluid collection. 1. Mild subcutaneous inflammatory stranding within the lower and anterior abdominal wall likely due to cellulitis; correlate with direct inspection.           MEDICAL DECISION MAKING / ED COURSE:      PROCEDURES:   Procedures    None    Patient was given:  Medications   promethazine (PHENERGAN) in sodium chloride 0.9% 50 mL IVPB 25 mg (25 mg Intravenous New Bag 21 1810)   0.9 % sodium chloride bolus (2,193 mLs Intravenous New Bag 7/22/21 1810)   cefepime (MAXIPIME) 2000 mg IVPB minibag (2,000 mg Intravenous New Bag 7/22/21 1810)   metronidazole (FLAGYL) 500 mg in NaCl 100 mL IVPB premix (has no administration in time range)   oxyCODONE HCl (OXY-IR) immediate release tablet 10 mg (10 mg Oral Given 7/22/21 1810)       Differential diagnosis: Necrotizing fasciitis, cellulitis, erysipelas, suppurative thrombophlebitis, aseptic superficial thrombophlebitis, DVT, gout, compartment syndrome, erythema migrans (lyme disease), contact dermatitis, lymphedema, other    Patient presents with postop fever and vomiting. See HPI for full presentation. Physical exam as above. 60-year-old lying in bed in no acute distress. Awake alert and oriented. Mild tenderness to the right lower abdominal wall. She has Steri-Strips covering a postop wound there has no surrounding redness or warmth. CBC shows leukocytosis with a white count of 15 and a left shift of 13 with no bandemia. Lactic acid 2.3. LFTs elevated. Patient given cefepime and Flagyl due to multiple allergies. She was given pain and nausea medicine as well as sepsis fluids. She will be admitted for further work-up and treatment. She was given all test results and given an opportunity to ask and have any questions answered. She was agreeable to admission. Dr. Sofi Melara was consulted and agreed to meet patient and write orders. The patient tolerated their visit well. I evaluated the patient. The physician was available for consultation as needed. The patient and / or the family were informed of the results of any tests, a time was given to answer questions, a plan was proposed and they agreed with plan. CLINICAL IMPRESSION:  1. Cellulitis of abdominal wall        DISPOSITION Admitted 07/22/2021 06:10:36 PM      PATIENT REFERRED TO:  No follow-up provider specified.     DISCHARGE MEDICATIONS:  New Prescriptions    No medications on file       DISCONTINUED MEDICATIONS:  Discontinued Medications    No medications on file              (Please note the MDM and HPI sections of this note were completed with a voice recognition program.  Efforts were made to edit the dictations but occasionally words are mis-transcribed.)    Electronically signed, FREDRICK Flaherty CNP,          FREDRICK Flaherty CNP  07/22/21 3138

## 2021-07-23 LAB
ANION GAP SERPL CALCULATED.3IONS-SCNC: 13 MMOL/L (ref 3–16)
BUN BLDV-MCNC: 15 MG/DL (ref 7–20)
CALCIUM SERPL-MCNC: 8.3 MG/DL (ref 8.3–10.6)
CHLORIDE BLD-SCNC: 105 MMOL/L (ref 99–110)
CO2: 23 MMOL/L (ref 21–32)
CREAT SERPL-MCNC: 0.7 MG/DL (ref 0.6–1.1)
GFR AFRICAN AMERICAN: >60
GFR NON-AFRICAN AMERICAN: >60
GLUCOSE BLD-MCNC: 120 MG/DL (ref 70–99)
HCT VFR BLD CALC: 35.7 % (ref 36–48)
HEMOGLOBIN: 12.3 G/DL (ref 12–16)
MCH RBC QN AUTO: 29.8 PG (ref 26–34)
MCHC RBC AUTO-ENTMCNC: 34.5 G/DL (ref 31–36)
MCV RBC AUTO: 86.2 FL (ref 80–100)
PDW BLD-RTO: 15.4 % (ref 12.4–15.4)
PLATELET # BLD: 238 K/UL (ref 135–450)
PMV BLD AUTO: 7.7 FL (ref 5–10.5)
POTASSIUM REFLEX MAGNESIUM: 3.8 MMOL/L (ref 3.5–5.1)
RBC # BLD: 4.14 M/UL (ref 4–5.2)
SODIUM BLD-SCNC: 141 MMOL/L (ref 136–145)
WBC # BLD: 9.4 K/UL (ref 4–11)

## 2021-07-23 PROCEDURE — 80048 BASIC METABOLIC PNL TOTAL CA: CPT

## 2021-07-23 PROCEDURE — 2580000003 HC RX 258: Performed by: NURSE PRACTITIONER

## 2021-07-23 PROCEDURE — 6370000000 HC RX 637 (ALT 250 FOR IP): Performed by: SURGERY

## 2021-07-23 PROCEDURE — 2580000003 HC RX 258: Performed by: SURGERY

## 2021-07-23 PROCEDURE — 6360000002 HC RX W HCPCS: Performed by: NURSE PRACTITIONER

## 2021-07-23 PROCEDURE — 85027 COMPLETE CBC AUTOMATED: CPT

## 2021-07-23 PROCEDURE — APPSS180 APP SPLIT SHARED TIME > 60 MINUTES: Performed by: NURSE PRACTITIONER

## 2021-07-23 PROCEDURE — 2500000003 HC RX 250 WO HCPCS: Performed by: SURGERY

## 2021-07-23 PROCEDURE — 6360000002 HC RX W HCPCS: Performed by: SURGERY

## 2021-07-23 PROCEDURE — 94761 N-INVAS EAR/PLS OXIMETRY MLT: CPT

## 2021-07-23 PROCEDURE — 36415 COLL VENOUS BLD VENIPUNCTURE: CPT

## 2021-07-23 PROCEDURE — G0378 HOSPITAL OBSERVATION PER HR: HCPCS

## 2021-07-23 RX ORDER — TRAMADOL HYDROCHLORIDE 50 MG/1
50 TABLET ORAL EVERY 6 HOURS PRN
Status: DISCONTINUED | OUTPATIENT
Start: 2021-07-23 | End: 2021-07-24 | Stop reason: HOSPADM

## 2021-07-23 RX ADMIN — ONDANSETRON 4 MG: 2 INJECTION INTRAMUSCULAR; INTRAVENOUS at 21:11

## 2021-07-23 RX ADMIN — CLINDAMYCIN PHOSPHATE 600 MG: 600 INJECTION, SOLUTION INTRAVENOUS at 13:27

## 2021-07-23 RX ADMIN — CLINDAMYCIN PHOSPHATE 600 MG: 600 INJECTION, SOLUTION INTRAVENOUS at 06:00

## 2021-07-23 RX ADMIN — ONDANSETRON 4 MG: 2 INJECTION INTRAMUSCULAR; INTRAVENOUS at 04:26

## 2021-07-23 RX ADMIN — SODIUM CHLORIDE: 9 INJECTION, SOLUTION INTRAVENOUS at 08:16

## 2021-07-23 RX ADMIN — MORPHINE SULFATE 4 MG: 4 INJECTION, SOLUTION INTRAMUSCULAR; INTRAVENOUS at 07:31

## 2021-07-23 RX ADMIN — PROMETHAZINE HYDROCHLORIDE 25 MG: 25 INJECTION INTRAMUSCULAR; INTRAVENOUS at 15:24

## 2021-07-23 RX ADMIN — MORPHINE SULFATE 4 MG: 4 INJECTION, SOLUTION INTRAMUSCULAR; INTRAVENOUS at 18:23

## 2021-07-23 RX ADMIN — ONDANSETRON 4 MG: 2 INJECTION INTRAMUSCULAR; INTRAVENOUS at 10:57

## 2021-07-23 RX ADMIN — ENOXAPARIN SODIUM 40 MG: 40 INJECTION SUBCUTANEOUS at 08:16

## 2021-07-23 RX ADMIN — MORPHINE SULFATE 4 MG: 4 INJECTION, SOLUTION INTRAMUSCULAR; INTRAVENOUS at 21:10

## 2021-07-23 RX ADMIN — HYDROCODONE BITARTRATE AND ACETAMINOPHEN 2 TABLET: 5; 325 TABLET ORAL at 10:57

## 2021-07-23 RX ADMIN — MORPHINE SULFATE 4 MG: 4 INJECTION, SOLUTION INTRAMUSCULAR; INTRAVENOUS at 12:03

## 2021-07-23 RX ADMIN — MORPHINE SULFATE 4 MG: 4 INJECTION, SOLUTION INTRAMUSCULAR; INTRAVENOUS at 23:21

## 2021-07-23 RX ADMIN — CLINDAMYCIN PHOSPHATE 600 MG: 600 INJECTION, SOLUTION INTRAVENOUS at 21:37

## 2021-07-23 RX ADMIN — TRAMADOL HYDROCHLORIDE 50 MG: 50 TABLET, FILM COATED ORAL at 22:36

## 2021-07-23 RX ADMIN — MORPHINE SULFATE 4 MG: 4 INJECTION, SOLUTION INTRAMUSCULAR; INTRAVENOUS at 02:11

## 2021-07-23 RX ADMIN — TERBINAFINE TABLETS 250 MG 250 MG: 250 TABLET ORAL at 08:16

## 2021-07-23 ASSESSMENT — PAIN DESCRIPTION - PROGRESSION
CLINICAL_PROGRESSION: NOT CHANGED
CLINICAL_PROGRESSION: GRADUALLY WORSENING
CLINICAL_PROGRESSION: NOT CHANGED

## 2021-07-23 ASSESSMENT — PAIN DESCRIPTION - PAIN TYPE
TYPE: ACUTE PAIN

## 2021-07-23 ASSESSMENT — PAIN SCALES - GENERAL
PAINLEVEL_OUTOF10: 0
PAINLEVEL_OUTOF10: 10
PAINLEVEL_OUTOF10: 0
PAINLEVEL_OUTOF10: 2
PAINLEVEL_OUTOF10: 8
PAINLEVEL_OUTOF10: 7
PAINLEVEL_OUTOF10: 8
PAINLEVEL_OUTOF10: 9
PAINLEVEL_OUTOF10: 9
PAINLEVEL_OUTOF10: 0
PAINLEVEL_OUTOF10: 8
PAINLEVEL_OUTOF10: 3
PAINLEVEL_OUTOF10: 8
PAINLEVEL_OUTOF10: 8
PAINLEVEL_OUTOF10: 5
PAINLEVEL_OUTOF10: 5
PAINLEVEL_OUTOF10: 8
PAINLEVEL_OUTOF10: 5
PAINLEVEL_OUTOF10: 7

## 2021-07-23 ASSESSMENT — ENCOUNTER SYMPTOMS
DIARRHEA: 0
APNEA: 0
WHEEZING: 0
NAUSEA: 1
CONSTIPATION: 0
CHOKING: 0
STRIDOR: 0
SHORTNESS OF BREATH: 1
RECTAL PAIN: 0
COUGH: 0
BLOOD IN STOOL: 0
CHEST TIGHTNESS: 0
ABDOMINAL DISTENTION: 0
ABDOMINAL PAIN: 1
VOMITING: 1
ANAL BLEEDING: 0

## 2021-07-23 ASSESSMENT — PAIN DESCRIPTION - ONSET
ONSET: ON-GOING
ONSET: GRADUAL
ONSET: GRADUAL
ONSET: ON-GOING
ONSET: GRADUAL
ONSET: ON-GOING
ONSET: GRADUAL
ONSET: ON-GOING

## 2021-07-23 ASSESSMENT — PAIN DESCRIPTION - ORIENTATION
ORIENTATION: RIGHT;LOWER
ORIENTATION: RIGHT
ORIENTATION: RIGHT;LOWER
ORIENTATION: RIGHT;LOWER
ORIENTATION: RIGHT
ORIENTATION: RIGHT

## 2021-07-23 ASSESSMENT — PAIN - FUNCTIONAL ASSESSMENT
PAIN_FUNCTIONAL_ASSESSMENT: PREVENTS OR INTERFERES SOME ACTIVE ACTIVITIES AND ADLS
PAIN_FUNCTIONAL_ASSESSMENT: ACTIVITIES ARE NOT PREVENTED
PAIN_FUNCTIONAL_ASSESSMENT: PREVENTS OR INTERFERES SOME ACTIVE ACTIVITIES AND ADLS
PAIN_FUNCTIONAL_ASSESSMENT: PREVENTS OR INTERFERES SOME ACTIVE ACTIVITIES AND ADLS
PAIN_FUNCTIONAL_ASSESSMENT: ACTIVITIES ARE NOT PREVENTED

## 2021-07-23 ASSESSMENT — PAIN DESCRIPTION - LOCATION
LOCATION: ABDOMEN

## 2021-07-23 ASSESSMENT — PAIN DESCRIPTION - DESCRIPTORS
DESCRIPTORS: DISCOMFORT
DESCRIPTORS: DISCOMFORT;SORE
DESCRIPTORS: DISCOMFORT
DESCRIPTORS: DISCOMFORT
DESCRIPTORS: SORE
DESCRIPTORS: DISCOMFORT;CONSTANT
DESCRIPTORS: DISCOMFORT
DESCRIPTORS: SORE

## 2021-07-23 ASSESSMENT — PAIN DESCRIPTION - FREQUENCY
FREQUENCY: CONTINUOUS

## 2021-07-23 NOTE — PROGRESS NOTES
PT arrived to room 4132. Alert and oriented. C/O pain and nausea. Oriented her to the room and call light in reach. Will continue to monitor.

## 2021-07-23 NOTE — PLAN OF CARE
Problem: Discharge Planning:  Goal: Discharged to appropriate level of care  Description: Discharged to appropriate level of care  7/23/2021 1118 by Carolina Jones RN  Outcome: Ongoing  7/23/2021 0332 by Elmo Escobar RN  Outcome: Ongoing     Problem:  Body Temperature - Imbalanced:  Goal: Ability to maintain a body temperature in the normal range will improve  Description: Ability to maintain a body temperature in the normal range will improve  7/23/2021 1118 by Carolina Jones RN  Outcome: Ongoing  7/23/2021 0332 by Elmo Escobar RN  Outcome: Ongoing     Problem: Mobility - Impaired:  Goal: Mobility will improve to maximum level  Description: Mobility will improve to maximum level  7/23/2021 1118 by Carolina Jones RN  Outcome: Ongoing  7/23/2021 0332 by Elmo Escobar RN  Outcome: Ongoing     Problem: Pain:  Goal: Pain level will decrease  Description: Pain level will decrease  7/23/2021 1118 by Carolina Jones RN  Outcome: Ongoing  7/23/2021 0332 by Elmo Escobar RN  Outcome: Ongoing  Goal: Control of acute pain  Description: Control of acute pain  7/23/2021 1118 by Carolina Jones RN  Outcome: Ongoing  7/23/2021 0332 by Elmo Escobar RN  Outcome: Ongoing  Goal: Control of chronic pain  Description: Control of chronic pain  7/23/2021 1118 by Carolina Jones RN  Outcome: Ongoing  7/23/2021 0332 by Elmo Escobar RN  Outcome: Ongoing     Problem: Skin Integrity - Impaired:  Goal: Will show no infection signs and symptoms  Description: Will show no infection signs and symptoms  7/23/2021 1118 by Carolina Jones RN  Outcome: Ongoing  7/23/2021 0332 by Elmo Escobar RN  Outcome: Ongoing  Goal: Absence of new skin breakdown  Description: Absence of new skin breakdown  7/23/2021 1118 by Carolina Jones RN  Outcome: Ongoing  7/23/2021 0332 by Elmo Escobar RN  Outcome: Ongoing     Problem: Pain:  Goal: Pain level will decrease  Description: Pain level will decrease  7/23/2021 1118 by Carolina Jones RN  Outcome: Ongoing  7/23/2021 0332 by Brandon Ace RN  Outcome: Ongoing  Goal: Control of acute pain  Description: Control of acute pain  Outcome: Ongoing  Goal: Control of chronic pain  Description: Control of chronic pain  Outcome: Ongoing

## 2021-07-23 NOTE — CARE COORDINATION
INITIAL CASE MANAGEMENT ASSESSMENT    Reviewed chart, met with patient to assess possible discharge needs. Explained Case Management role/services. Living Situation: Patient lives with her mother in a trailer with 5 steps to enter. ADLs: Independent     DME: None    PT/OT Recs: not ordered     Active Services: None     Transportation: Active /Mother will transport     Medications: [employer]/No barriers    PCP: FREDRICK Solomon CNP      HD/PD: N/A    PLAN/COMMENTS: Patient plans to return to home with her Mother. SW/CM provided contact information for patient or family to call with any questions. SW/CM will follow and assist as needed.   Electronically signed by Adelia Hensley RN on 7/23/2021 at 3:31 PM

## 2021-07-23 NOTE — H&P
General Surgery Consult History and Physical Note      Amanda Standard   : 1980 MRN: [id number]  Date of Admission: 2021  Admitting Deanne Jj MD  Primary Care Physician: FREDRICK Lerner - CNP    Chief complaint: Abdominal pain, nausea    Diagnosis Present on Admission:   Cellulitis of abdominal wall      History of Present Illness  Petrona Mathis is a 47 y.o. female admitted on 2021 for abdominal pain. Extensive past medical history as below, status post excision of a subcutaneous mass of the abdominal wall 2021. Pathology was benign skin and subcu tissue with focal degenerative change and evidence of previous hemorrhage, negative for malignancy. Called the office yesterday with complaints of fever to 101, chills, painful vomiting. Denies changes in bowel or bladder habits. She has been afebrile since admission    Past Medical History:   Diagnosis Date    ADHD (attention deficit hyperactivity disorder) 82    Depression with anxiety     Diabetes mellitus ([address]     pre-diabetes    Difficult intubation     Encounter for imaging to screen for metal prior to MRI 2021    MRI Conditional Medtronic Non-Programmable shunt model#[id number] implanted 10/30/2020 at [facility]. Normal Mode. 1.5T or 3.0T.    ESBL (extended spectrum beta-lactamase) producing bacteria infection 2019    urine    Functional ovarian cysts 2008    rt ovary cyst x 2 yrs.     Headache(784.0)     migraines    History of blood transfusion     at birth   Allen Darnell History of kidney stones     History of PCOS     Hydro[address]us [address] [address]     Hyperlipidemia     Irritable bowel syndrome     had colonoscopy about 6 yrs ago    Meningitis     Neutrophilic leukocytosis     Nicotine dependence     PONV (postoperative nausea and vomiting)     very nauseated and sometimes wakes up with a Migraine--happened once after brain surgery    Primary osteoarthritis of left knee 2016    S/P cone biopsy of cervix 2004    Scoliosis 1990.s     (ventriculoperitoneal) shunt status 1982    hydrocephalus f/w Neuro[facility]geon [facility]     (ventriculoperitoneal) shunt status     Wears glasses     reading     Past Surgical History:   Procedure Laterality Date    ABDOMEN SURGERY N/A 2021    REMOVAL OF ABDOMINAL WALL MASS performed by Giselle Dougherty MD at [address] [place] [address]      appendicitis     SECTION  2005    placenta previa    CHOLECYSTECTOMY      COLONOSCOPY  2004    COLPOSCOPY      CSF SHUNT      replaced at age 17   [facility] CYSTOSCOPY      stone removal    HEMORRHOID SURGERY      HERNIA REPAIR Bilateral     inguinal    HERNIA REPAIR      hiatal hernia    HYSTERECTOMY, TOTAL ABDOMINAL  2016    TAHBSO, adhesions/PCOS    KNEE ARTHROSCOPY Left 2015    medial meniscectomy, chondroplasty, plica resection    LITHOTRIPSY Bilateral 12/10/2019    OTHER SURGICAL HISTORY  10/19/2016    op lap    VENTRICULOPERITONEAL SHUNT      multiple revisions, most recent      Family History   Problem Relation Age of Onset    High Blood Pressure Mother     Diabetes Mother     High Cholesterol Mother     Depression Mother     Diabetes Maternal Grandmother     High Blood Pressure Maternal Grandmother     High Cholesterol Maternal Grandmother     Heart Disease Maternal Grandfather     High Blood Pressure Maternal Grandfather     Heart Disease Paternal Grandmother     Stroke Paternal Grandfather     Depression Sister     Cirrhosis Father     Rheum Arthritis Neg Hx     Osteoarthritis Neg Hx     Asthma Neg Hx     Breast Cancer Neg Hx     Cancer Neg Hx     Heart Failure Neg Hx     Hypertension Neg Hx     Migraines Neg Hx     Ovarian Cancer Neg Hx     Rashes/Skin Problems Neg Hx     Seizures Neg Hx     Thyroid Disease Neg Hx      Social History     Socioeconomic History    Marital status: Single     Spouse name: Not on file    Number of children: Not on file    Years of education: Not on file    Highest education level: Not on file   Occupational History    Occupation: disability, SSI    Tobacco Use    Smoking status: Current Some Day Smoker     Packs/day: 0.50     Years: 18.00     Pack years: 9.00     Types: Cigarettes    Smokeless tobacco: Never Used   Vaping Use    Vaping Use: Never used   Substance and Sexual Activity    Alcohol use: No     Alcohol/week: 0.0 standard drinks    Drug use: Never    Sexual activity: Not Currently     Partners: Male   Other Topics Concern    Not on file   Social History Narrative    Not on file     Social Determinants of Health     Financial Resource Strain: Low Risk     Difficulty of Paying Living Expenses: Not hard at all   Food Insecurity: No Food Insecurity    Worried About Running Out of Food in the Last Year: Never true    [place] of Food in the Last Year: Never true   Transportation Needs:     Lack of Transportation (Medical):  Lack of Transportation (Non-Medical):    Physical Activity:     Days of Exercise per Week:     Minutes of Exercise per Session:    Stress:     Feeling of Stress :    Social Connections:     Frequency of Communication with Friends and Family:     Frequency of Social Gatherings with Friends and Family:     Attends Shinto Services:     Active Member of Clubs or Organizations:     Attends Club or Organization Meetings:     Marital Status:    Intimate Partner Violence:     Fear of Current or Ex-Partner:     Emotionally Abused:     Physically Abused:     Sexually Abused: Allergies   Allergen Reactions    Bee Venom Shortness Of Breath and Swelling    Bentyl [Dicyclomine Hcl] Shortness Of Breath and Anxiety    Mushroom Extract Complex Anaphylaxis     Can't eat mushrooms.      Sulfa Antibiotics Shortness Of Breath    Vancomycin Anaphylaxis and Hives    Adhesive Tape Dermatitis    Toradol [Ketorolac Tromethamine] Hives and Itching Injectable only  Tolerates PO NSAIDs fine    Acetaminophen Anxiety     Patient reports when taking acetaminophen (including Norco/Percocet) she gets severe anxiety when taking this medication.  Ceftaroline Rash    Daptomycin Swelling and Rash    Dicyclomine Anxiety    Fioricet-Codeine [Butalbital-Apap-Caff-Cod] Anxiety    Levaquin [Levofloxacin] Anxiety    Methocarbamol Rash    Prochlorperazine Anxiety    Tazobactam Nausea And Vomiting and Anxiety    Zosyn [Piperacillin Sod-Tazobactam So] Hives     Also causes nausea, SOB, dizziness     Prior to Admission medications    Medication Sig Start Date End Date Taking? Authorizing Provider   doxycycline hyclate (VIBRA-TABS) 100 MG tablet Take 1 tablet by mouth 2 times daily for 7 days 7/21/21 7/28/21 Yes FREDRICK Mayorga CNP   ondansetron (ZOFRAN ODT) 4 MG disintegrating tablet Take 1-2 tablets by mouth every 12 hours as needed for Nausea May Sub regular tablet (non-ODT) if insurance does not cover ODT. 7/21/21  Yes FREDRICK Mayorga CNP   promethazine (PROMETHEGAN) 25 MG suppository Place 1 suppository rectally every 6 hours as needed for Nausea (or vomiting) 7/21/21 7/28/21 Yes FREDRICK Mayorga CNP   promethazine (PHENERGAN) 25 MG tablet Take 1 tablet by mouth every 6 hours as needed for Nausea WARNING:  May cause drowsiness. May impair ability to operate vehicles or machinery. Do not use in combination with alcohol. 7/21/21 7/28/21 Yes FREDRICK Mayorga CNP   mupirocin (BACTROBAN) 2 % ointment Apply 3 times daily. 7/19/21 7/26/21 Yes FREDRICK Mcneal CNP   triamcinolone (KENALOG) 0.025 % ointment Apply topically 2 times daily.  7/19/21 7/26/21 Yes Parag RunningFREDRICK CNP   simvastatin (ZOCOR) 10 MG tablet Take 1 tablet by mouth nightly 7/8/21  Yes Ressie RunningFREDRICK CNP   terbinafine (LAMISIL) 250 MG tablet Take 1 tablet by mouth daily 6/17/21 7/29/21 Yes Lupis Wilcox, APRN - CNP   SUMAtriptan (IMITREX) 100 MG tablet Take 1 tablet by mouth once as needed for Migraine 6/17/21 7/13/21  Soha Rincon, APRN - CNP       Review of Systems  12 point review of systems was reviewed and negative except for that listed in HPI    Physical Exam  Vitals:    07/23/21 0352 07/23/21 0420 07/23/21 0834 07/23/21 1505   BP:  111/73  98/62   Pulse:  57  82   Resp:  16  16   Temp:  97.9 °F (36.6 °C)  98 °F (36.7 °C)   TempSrc:    Oral   SpO2:  98% 99% 99%   Weight: 161 lb 2.5 oz (73.1 kg)      Height: 4' 11.02\" (1.499 m)          No intake or output data in the 24 hours ending 07/23/21 1624    Body mass index is 32.53 kg/m². Genera Appearance: alert, well appearing, and in no distress   HEENT: NCAT, PERRLA, Conjunctiva non injected, no scleral icterus. External ears and nares normal bilaterally. Mucous membranes pink and moist.   Neck: Neck is symmetrical. Trachea appears midline. Lung: Clear to auscultation bilaterally, normal rate and effort   Cardiac: Regular rate and rhythm, S1S2 present, without murmur   Abdomen: Soft, minimal periumbilical radiating to right upper quadrant tenderness without peritonitis. Scars consistent with surgical history. Well healing incision status post mass excision without surrounding cellulitis or induration. Neuro: No gross motor or sensory deficits. Skin: No open wounds or rashes.   MSK: normal ROM, no edema  Psych: normal insight, judgment    Labs  Recent Labs     07/21/21 1810 07/22/21  1611 07/23/21  0537   WBC 14.8* 15.1* 9.4   HGB 15.2 14.5 12.3   HCT 44.0 41.8 35.7*    310 238     Recent Labs     07/21/21 1810 07/22/21  1611 07/23/21  0537    138 141   K 4.5 3.8 3.8    101 105   CO2 19* 22 23   BUN 16 14 15   GFRAA >60 >60 >60     Recent Labs     07/21/21 1810 07/22/21  1611   GLOB 3.7 3.6   AST 13* 39*   ALT 7* 68*     Invalid input(s): UAPR, UCOL, UNAR, Tiffany, Ezequiel, Reji, Jennifer, Shahbaz, UOSM, EOS    Imaging  XR CHEST PORTABLE   Final Result   No acute abnormality Trae Downey is a 47 y.o. female admitted for nausea, abdominal pain    Plan    1. Nausea, abdominal pain  · Leukocytosis 15.1 on admission down to 9.4 today with dose of flagyl and cefepime yesterday and now cleocin Q8.   · CT with mild subcutaneous inflammatory stranding status post excision of subcutaneous abdominal wall mass 7/14/2021. Physical exam without cellulitis. Pain does not seem to correlate or be related to recent surgery. Was feeling better, but had nausea after attempting p.o. intake.        Electronically signed by FREDRICK Collado CNP on 7/23/21 at 4:24 PM EDT     As above    As per note above by Álvaro Yeager  Patient was personally seen and examined by me today  Chart, labs and imaging reviewed    A/P  Postop from removal of abdominal wall mass, no outward sign of infection   She does report pain and warmth in the area but no erythema noted today   CT from earlier this week reviewed, may just be postop change    Feeling better since admission   Given her sensitivity to antibiotics that may be the etiology of her ongoing nausea   Attempt symptom control for now    No intervention needed    Electronically signed by Lady Castelan MD on 7/23/2021 at 4:48 PM

## 2021-07-23 NOTE — PLAN OF CARE
Problem: Discharge Planning:  Goal: Discharged to appropriate level of care  Description: Discharged to appropriate level of care  7/23/2021 1118 by Saumya Vargas RN  Outcome: Ongoing  7/23/2021 1118 by Saumya Vargas RN  Outcome: Ongoing  7/23/2021 0332 by Madelyn Cooks, RN  Outcome: Ongoing     Problem:  Body Temperature - Imbalanced:  Goal: Ability to maintain a body temperature in the normal range will improve  Description: Ability to maintain a body temperature in the normal range will improve  7/23/2021 1118 by Saumya Vargas RN  Outcome: Ongoing  7/23/2021 1118 by Saumya Vargas RN  Outcome: Ongoing  7/23/2021 0332 by Madelyn Cooks, RN  Outcome: Ongoing     Problem: Mobility - Impaired:  Goal: Mobility will improve to maximum level  Description: Mobility will improve to maximum level  7/23/2021 1118 by Saumya Vargas RN  Outcome: Ongoing  7/23/2021 1118 by Saumya Vargas RN  Outcome: Ongoing  7/23/2021 0332 by Madelyn Cooks, RN  Outcome: Ongoing     Problem: Pain:  Goal: Pain level will decrease  Description: Pain level will decrease  7/23/2021 1118 by Saumya Vargas RN  Outcome: Ongoing  7/23/2021 1118 by Saumya Vargas RN  Outcome: Ongoing  7/23/2021 0332 by Madelyn Cooks, RN  Outcome: Ongoing  Goal: Control of acute pain  Description: Control of acute pain  7/23/2021 1118 by Saumya Vargas RN  Outcome: Ongoing  7/23/2021 1118 by Saumya Vargas RN  Outcome: Ongoing  7/23/2021 0332 by Madelyn Cooks, RN  Outcome: Ongoing  Goal: Control of chronic pain  Description: Control of chronic pain  7/23/2021 1118 by Saumya Vargas RN  Outcome: Ongoing  7/23/2021 1118 by Saumya Vargas RN  Outcome: Ongoing  7/23/2021 0332 by Madelyn Cooks, RN  Outcome: Ongoing     Problem: Skin Integrity - Impaired:  Goal: Will show no infection signs and symptoms  Description: Will show no infection signs and symptoms  7/23/2021 1118 by Saumya Vargas RN  Outcome: Ongoing  7/23/2021 1118 by Saumya Vargas

## 2021-07-23 NOTE — ED PROVIDER NOTES
Attending Supervisory Note/Shared Visit   I have personally performed a face to face diagnostic evaluation on this patient. I have reviewed the mid-levels findings and agree. History and Exam by me shows a well-appearing female no acute distress. Patient recently had a necrotic lipoma removed from her abdominal wall. She reports progressively worsening pain around her surgical site. She denies fevers nausea vomiting or changes in bowel function. She states that she took the call her surgeon presented to the ED secondary to pain. Patient does take prescribed narcotic medications which she states were not helping. She also reports associated nausea and vomiting. She denies drainage from the surgical site. Patient also reports some redness around her left eye. Denies eye pain with eye movements. She denies changes in vision. Denies headache or neck pain or stiffness. On exam vital signs within normal limits. Patient's lungs clear auscultation. She does have a well-appearing surgical site without drainage or surrounding erythema. There is tenderness at the surgical site. She does have erythema involving both the upper and lower eyelid. Patient's work-up remarkable for leukocytosis. Lactate within normal limits. No other significant electrolyte normalities. CT of the abdomen demonstrated some soft tissue stranding. Due to the patient's leukocytosis she will be started on oral antibiotics which should cover for preseptal cellulitis which appears she has on the left side of her face. Patient instructed to follow-up with her surgeon. Strict return precautions discussed with the patient. I agree with the [facility] plan of care. Please [facility] Note for full ED course and final disposition. Disposition:  1. Cellulitis of abdominal wall    2.  Nausea          Martínez Santana MD  Attending Emergency Physician        Martínez Santana MD  07/22/21 2018

## 2021-07-24 VITALS
DIASTOLIC BLOOD PRESSURE: 68 MMHG | HEART RATE: 57 BPM | WEIGHT: 159.39 LBS | RESPIRATION RATE: 16 BRPM | TEMPERATURE: 98.3 F | SYSTOLIC BLOOD PRESSURE: 102 MMHG | HEIGHT: 59 IN | OXYGEN SATURATION: 98 % | BODY MASS INDEX: 32.13 KG/M2

## 2021-07-24 PROCEDURE — 6360000002 HC RX W HCPCS: Performed by: SURGERY

## 2021-07-24 PROCEDURE — 99024 POSTOP FOLLOW-UP VISIT: CPT | Performed by: SURGERY

## 2021-07-24 PROCEDURE — 6370000000 HC RX 637 (ALT 250 FOR IP): Performed by: SURGERY

## 2021-07-24 PROCEDURE — 2500000003 HC RX 250 WO HCPCS: Performed by: SURGERY

## 2021-07-24 PROCEDURE — G0378 HOSPITAL OBSERVATION PER HR: HCPCS

## 2021-07-24 RX ORDER — TRAMADOL HYDROCHLORIDE 50 MG/1
50 TABLET ORAL EVERY 6 HOURS PRN
Qty: 20 TABLET | Refills: 0 | Status: SHIPPED | OUTPATIENT
Start: 2021-07-24 | End: 2021-07-29

## 2021-07-24 RX ADMIN — TRAMADOL HYDROCHLORIDE 50 MG: 50 TABLET, FILM COATED ORAL at 11:49

## 2021-07-24 RX ADMIN — ENOXAPARIN SODIUM 40 MG: 40 INJECTION SUBCUTANEOUS at 08:22

## 2021-07-24 RX ADMIN — MORPHINE SULFATE 4 MG: 4 INJECTION, SOLUTION INTRAMUSCULAR; INTRAVENOUS at 04:38

## 2021-07-24 RX ADMIN — ONDANSETRON 4 MG: 2 INJECTION INTRAMUSCULAR; INTRAVENOUS at 04:38

## 2021-07-24 RX ADMIN — MORPHINE SULFATE 4 MG: 4 INJECTION, SOLUTION INTRAMUSCULAR; INTRAVENOUS at 01:54

## 2021-07-24 RX ADMIN — MORPHINE SULFATE 4 MG: 4 INJECTION, SOLUTION INTRAMUSCULAR; INTRAVENOUS at 08:18

## 2021-07-24 RX ADMIN — CLINDAMYCIN PHOSPHATE 600 MG: 600 INJECTION, SOLUTION INTRAVENOUS at 05:48

## 2021-07-24 ASSESSMENT — PAIN DESCRIPTION - DESCRIPTORS
DESCRIPTORS: CONSTANT;DISCOMFORT
DESCRIPTORS: CONSTANT
DESCRIPTORS: DISCOMFORT
DESCRIPTORS: CONSTANT
DESCRIPTORS: DISCOMFORT

## 2021-07-24 ASSESSMENT — PAIN DESCRIPTION - ORIENTATION
ORIENTATION: RIGHT;LOWER

## 2021-07-24 ASSESSMENT — PAIN DESCRIPTION - LOCATION
LOCATION: ABDOMEN

## 2021-07-24 ASSESSMENT — PAIN DESCRIPTION - ONSET
ONSET: ON-GOING

## 2021-07-24 ASSESSMENT — PAIN DESCRIPTION - PAIN TYPE
TYPE: ACUTE PAIN

## 2021-07-24 ASSESSMENT — PAIN DESCRIPTION - FREQUENCY
FREQUENCY: CONTINUOUS

## 2021-07-24 ASSESSMENT — PAIN DESCRIPTION - PROGRESSION
CLINICAL_PROGRESSION: NOT CHANGED

## 2021-07-24 ASSESSMENT — PAIN SCALES - GENERAL
PAINLEVEL_OUTOF10: 6
PAINLEVEL_OUTOF10: 5
PAINLEVEL_OUTOF10: 8
PAINLEVEL_OUTOF10: 5
PAINLEVEL_OUTOF10: 8
PAINLEVEL_OUTOF10: 5
PAINLEVEL_OUTOF10: 6
PAINLEVEL_OUTOF10: 8

## 2021-07-24 NOTE — DISCHARGE SUMMARY
General Surgery Discharge Summary        Salvador Cornell   : 1980 MRN: [id number]  Date of Admission: 2021  Date of Discharge:   Admitting Physician:Bo Kapadia MD  Primary Care Physician: Anita Parry APRN - CNP  Summary by: Terra Matos MD    Diagnosis Present on Admission:   Cellulitis of abdominal wall      Secondary diagnosis:   Patient Active Problem List   Diagnosis     (ventriculoperitoneal) shunt status    Scoliosis    Prediabetes    Tobacco smoker    Chronic nonintractable headache    Onychomycosis    Congenital hydrocephalus ([address]    Viral meningitis    Pyuria    Hypokalemia    Sepsis ([address]    Meningitis    Allergy to multiple antibiotics    Ureteritis    Acute cystitis without hematuria    Urinary retention    Guzman catheter in place    Lactic acidosis    Neutrophilic leukocytosis    Complicated UTI (urinary tract infection)    Nausea & vomiting    Renal colic    Volume depletion    Headache    Herpes zoster    Intractable migraine with aura without status migrainosus    History of brain shunt    Shunt malfunction    Abdominal wall mass    Cellulitis of abdominal wall         Summary of hospital stay:   Salvador Cornell is a 47 y.o. female admitted for possible cellulitis, nausea/emesis after excision abdominal wall mass. Reports nausea improved. Still with some burning at incision site which is clean without signs of cellulitis, fluctuance. Wound healing well. D/C home in good condition with ultram at her request.  Will hold on abx for now given her significant allergies to nearly all antibiotics. Discharge Medications:  Current Discharge Medication List      START taking these medications    Details   traMADol (ULTRAM) 50 MG tablet Take 1 tablet by mouth every 6 hours as needed for Pain for up to 5 days. Intended supply: 5 days.  Take lowest dose possible to manage pain  Qty: 20 tablet, Refills: 0    Comments: Reduce doses taken as pain becomes manageable  Associated Diagnoses: Abdominal wall mass         CONTINUE these medications which have NOT CHANGED    Details   ondansetron (ZOFRAN ODT) 4 MG disintegrating tablet Take 1-2 tablets by mouth every 12 hours as needed for Nausea May Sub regular tablet (non-ODT) if insurance does not cover ODT. Qty: 12 tablet, Refills: 0      promethazine (PROMETHEGAN) 25 MG suppository Place 1 suppository rectally every 6 hours as needed for Nausea (or vomiting)  Qty: 7 suppository, Refills: 0      promethazine (PHENERGAN) 25 MG tablet Take 1 tablet by mouth every 6 hours as needed for Nausea WARNING:  May cause drowsiness. May impair ability to operate vehicles or machinery. Do not use in combination with alcohol. Qty: 20 tablet, Refills: 0      mupirocin (BACTROBAN) 2 % ointment Apply 3 times daily. Qty: 1 Tube, Refills: 1    Associated Diagnoses: Infection of nail bed of finger, unspecified laterality; Eczema, unspecified type      triamcinolone (KENALOG) 0.025 % ointment Apply topically 2 times daily.   Qty: 1 Tube, Refills: 0    Associated Diagnoses: Eczema, unspecified type      simvastatin (ZOCOR) 10 MG tablet Take 1 tablet by mouth nightly  Qty: 90 tablet, Refills: 1    Associated Diagnoses: Mixed hyperlipidemia      terbinafine (LAMISIL) 250 MG tablet Take 1 tablet by mouth daily  Qty: 42 tablet, Refills: 0    Associated Diagnoses: Nail fungal infection      SUMAtriptan (IMITREX) 100 MG tablet Take 1 tablet by mouth once as needed for Migraine  Qty: 9 tablet, Refills: 0    Associated Diagnoses: Chronic nonintractable headache, unspecified headache type         STOP taking these medications       doxycycline hyclate (VIBRA-TABS) 100 MG tablet Comments:   Reason for Stopping:         metFORMIN (GLUCOPHAGE) 500 MG tablet Comments:   Reason for Stopping:               Discharged to:  home    Instruction:  The patient is instructed to return to the hospital or call the office for fevers > 101.5F, inability to tolerate a diet, or unexpected pain. Surgery specific discharge instruction sheet was provided to the patient.   Diet: regular diet   Activity: activity as tolerated    Follow up:  Dr. Maggy Vázquez 2 weeks    Electronically signed by Natacha Conte MD on 7/24/2021 at 10:12 AM

## 2021-07-24 NOTE — CARE COORDINATION
Noted d/c order. No antibiotics ordered.    Electronically signed by Adan Mcgowan on 7/24/2021 at 11:15 AM

## 2021-07-25 LAB — BLOOD CULTURE, ROUTINE: NORMAL

## 2021-07-28 ENCOUNTER — APPOINTMENT (OUTPATIENT)
Dept: CT IMAGING | Age: 41
End: 2021-07-28
Payer: COMMERCIAL

## 2021-07-28 ENCOUNTER — HOSPITAL ENCOUNTER (EMERGENCY)
Age: 41
Discharge: HOME | End: 2021-07-28
Attending: EMERGENCY MEDICINE
Payer: COMMERCIAL

## 2021-07-28 VITALS
RESPIRATION RATE: 16 BRPM | SYSTOLIC BLOOD PRESSURE: 114 MMHG | HEIGHT: 59 IN | BODY MASS INDEX: 32.71 KG/M2 | WEIGHT: 162.26 LBS | DIASTOLIC BLOOD PRESSURE: 78 MMHG | TEMPERATURE: 98.4 F | HEART RATE: 80 BPM | OXYGEN SATURATION: 99 %

## 2021-07-28 DIAGNOSIS — N20.0 NEPHROLITHIASIS: ICD-10-CM

## 2021-07-28 DIAGNOSIS — R10.9 RIGHT FLANK PAIN: Primary | ICD-10-CM

## 2021-07-28 LAB
A/G RATIO: 1.3 (ref 1.1–2.2)
ALBUMIN SERPL-MCNC: 4.5 G/DL (ref 3.4–5)
ALP BLD-CCNC: 114 U/L (ref 40–129)
ALT SERPL-CCNC: 13 U/L (ref 10–40)
ANION GAP SERPL CALCULATED.3IONS-SCNC: 13 MMOL/L (ref 3–16)
AST SERPL-CCNC: 6 U/L (ref 15–37)
BACTERIA: ABNORMAL /HPF
BASOPHILS ABSOLUTE: 0.1 K/UL (ref 0–0.2)
BASOPHILS RELATIVE PERCENT: 0.7 %
BILIRUB SERPL-MCNC: <0.2 MG/DL (ref 0–1)
BILIRUBIN URINE: NEGATIVE
BLOOD, URINE: ABNORMAL
BUN BLDV-MCNC: 13 MG/DL (ref 7–20)
CALCIUM SERPL-MCNC: 10 MG/DL (ref 8.3–10.6)
CHLORIDE BLD-SCNC: 101 MMOL/L (ref 99–110)
CLARITY: CLEAR
CO2: 26 MMOL/L (ref 21–32)
COLOR: YELLOW
CREAT SERPL-MCNC: 0.7 MG/DL (ref 0.6–1.1)
EOSINOPHILS ABSOLUTE: 0.3 K/UL (ref 0–0.6)
EOSINOPHILS RELATIVE PERCENT: 1.8 %
EPITHELIAL CELLS, UA: ABNORMAL /HPF (ref 0–5)
GFR AFRICAN AMERICAN: >60
GFR NON-AFRICAN AMERICAN: >60
GLOBULIN: 3.6 G/DL
GLUCOSE BLD-MCNC: 139 MG/DL (ref 70–99)
GLUCOSE URINE: NEGATIVE MG/DL
HCG QUALITATIVE: NEGATIVE
HCT VFR BLD CALC: 44.5 % (ref 36–48)
HEMOGLOBIN: 15.1 G/DL (ref 12–16)
KETONES, URINE: NEGATIVE MG/DL
LEUKOCYTE ESTERASE, URINE: NEGATIVE
LYMPHOCYTES ABSOLUTE: 3.7 K/UL (ref 1–5.1)
LYMPHOCYTES RELATIVE PERCENT: 22.6 %
MCH RBC QN AUTO: 29.2 PG (ref 26–34)
MCHC RBC AUTO-ENTMCNC: 33.9 G/DL (ref 31–36)
MCV RBC AUTO: 86.1 FL (ref 80–100)
MICROSCOPIC EXAMINATION: YES
MONOCYTES ABSOLUTE: 1.1 K/UL (ref 0–1.3)
MONOCYTES RELATIVE PERCENT: 7 %
NEUTROPHILS ABSOLUTE: 11 K/UL (ref 1.7–7.7)
NEUTROPHILS RELATIVE PERCENT: 67.9 %
NITRITE, URINE: NEGATIVE
PDW BLD-RTO: 15.2 % (ref 12.4–15.4)
PH UA: 6 (ref 5–8)
PLATELET # BLD: 366 K/UL (ref 135–450)
PMV BLD AUTO: 7.4 FL (ref 5–10.5)
POTASSIUM REFLEX MAGNESIUM: 3.7 MMOL/L (ref 3.5–5.1)
PROTEIN UA: NEGATIVE MG/DL
RBC # BLD: 5.17 M/UL (ref 4–5.2)
RBC UA: ABNORMAL /HPF (ref 0–4)
SODIUM BLD-SCNC: 140 MMOL/L (ref 136–145)
SPECIFIC GRAVITY UA: <=1.005 (ref 1–1.03)
TOTAL PROTEIN: 8.1 G/DL (ref 6.4–8.2)
URINE TYPE: ABNORMAL
UROBILINOGEN, URINE: 0.2 E.U./DL
WBC # BLD: 16.2 K/UL (ref 4–11)
WBC UA: ABNORMAL /HPF (ref 0–5)

## 2021-07-28 PROCEDURE — 99284 EMERGENCY DEPT VISIT MOD MDM: CPT

## 2021-07-28 PROCEDURE — 36415 COLL VENOUS BLD VENIPUNCTURE: CPT

## 2021-07-28 PROCEDURE — 81001 URINALYSIS AUTO W/SCOPE: CPT

## 2021-07-28 PROCEDURE — 80053 COMPREHEN METABOLIC PANEL: CPT

## 2021-07-28 PROCEDURE — 2580000003 HC RX 258: Performed by: EMERGENCY MEDICINE

## 2021-07-28 PROCEDURE — 85025 COMPLETE CBC W/AUTO DIFF WBC: CPT

## 2021-07-28 PROCEDURE — 84703 CHORIONIC GONADOTROPIN ASSAY: CPT

## 2021-07-28 PROCEDURE — 6360000002 HC RX W HCPCS: Performed by: EMERGENCY MEDICINE

## 2021-07-28 PROCEDURE — 96375 TX/PRO/DX INJ NEW DRUG ADDON: CPT

## 2021-07-28 PROCEDURE — 74176 CT ABD & PELVIS W/O CONTRAST: CPT

## 2021-07-28 PROCEDURE — 96374 THER/PROPH/DIAG INJ IV PUSH: CPT

## 2021-07-28 RX ORDER — ONDANSETRON 4 MG/1
4 TABLET, ORALLY DISINTEGRATING ORAL EVERY 8 HOURS PRN
Qty: 20 TABLET | Refills: 0 | Status: SHIPPED | OUTPATIENT
Start: 2021-07-28 | End: 2021-08-04

## 2021-07-28 RX ORDER — 0.9 % SODIUM CHLORIDE 0.9 %
1000 INTRAVENOUS SOLUTION INTRAVENOUS ONCE
Status: COMPLETED | OUTPATIENT
Start: 2021-07-28 | End: 2021-07-28

## 2021-07-28 RX ORDER — ONDANSETRON 2 MG/ML
4 INJECTION INTRAMUSCULAR; INTRAVENOUS EVERY 30 MIN PRN
Status: DISCONTINUED | OUTPATIENT
Start: 2021-07-28 | End: 2021-07-28 | Stop reason: HOSPADM

## 2021-07-28 RX ORDER — ALPRAZOLAM 0.5 MG/1
0.5 TABLET ORAL DAILY PRN
COMMUNITY
Start: 2021-07-27 | End: 2021-10-26

## 2021-07-28 RX ORDER — OXYCODONE HYDROCHLORIDE AND ACETAMINOPHEN 5; 325 MG/1; MG/1
1 TABLET ORAL EVERY 6 HOURS PRN
Qty: 12 TABLET | Refills: 0 | Status: SHIPPED | OUTPATIENT
Start: 2021-07-28 | End: 2021-08-04

## 2021-07-28 RX ORDER — MORPHINE SULFATE 4 MG/ML
4 INJECTION, SOLUTION INTRAMUSCULAR; INTRAVENOUS EVERY 30 MIN PRN
Status: DISCONTINUED | OUTPATIENT
Start: 2021-07-28 | End: 2021-07-28 | Stop reason: HOSPADM

## 2021-07-28 RX ADMIN — SODIUM CHLORIDE 1000 ML: 9 INJECTION, SOLUTION INTRAVENOUS at 04:58

## 2021-07-28 RX ADMIN — ONDANSETRON 4 MG: 2 INJECTION INTRAMUSCULAR; INTRAVENOUS at 04:59

## 2021-07-28 RX ADMIN — MORPHINE SULFATE 4 MG: 4 INJECTION, SOLUTION INTRAMUSCULAR; INTRAVENOUS at 04:59

## 2021-07-28 ASSESSMENT — PAIN DESCRIPTION - DESCRIPTORS: DESCRIPTORS: DISCOMFORT

## 2021-07-28 ASSESSMENT — PAIN SCALES - GENERAL
PAINLEVEL_OUTOF10: 4
PAINLEVEL_OUTOF10: 8
PAINLEVEL_OUTOF10: 8

## 2021-07-28 ASSESSMENT — PAIN DESCRIPTION - ORIENTATION: ORIENTATION: RIGHT

## 2021-07-28 ASSESSMENT — PAIN DESCRIPTION - LOCATION: LOCATION: FLANK

## 2021-07-28 NOTE — ED PROVIDER NOTES
Emergency Physician Note  [address] [address] [address] [facility] [address]  Dept: [phone]  Loc: [phone]  Open Note Time:  4:42 AM EDT    Chief Complaint  Flank Pain (reports right flank pain x 6 days w some nausea/ )       History of Present Illness  Collette Smack is a 47 y.o. female  has a past medical history of ADHD (attention deficit hyperactivity disorder), Depression with anxiety, Diabetes mellitus ([address] Difficult intubation, Encounter for imaging to screen for metal prior to MRI, ESBL (extended spectrum beta-lactamase) producing bacteria infection, Functional ovarian cysts, Headache(784.0), History of blood transfusion, History of kidney stones, History of PCOS, Hydrocephalus ([address] Hyperlipidemia, Irritable bowel syndrome, Meningitis, Neutrophilic leukocytosis, Nicotine dependence, PONV (postoperative nausea and vomiting), Primary osteoarthritis of left knee, S/P cone biopsy of cervix, Scoliosis,  (ventriculoperitoneal) shunt status,  (ventriculoperitoneal) shunt status, and Wears glasses. who presents to the ED for right flank pain. Patient was recently treated for cellulitis of the abdominal wall however she states the symptoms associated with that have completely resolved. She said while she was admitted for the abdominal wall cellulitis she has developed pain on the right flank that is sharp and radiates to her right groin and back around her right flank. She states is similar to the kidney stones that she has had in the past.  The pain has been constant since discharge 6 days ago however the pain got significantly worse today. She states she had pinkish urine yesterday. She has had nausea but no vomiting. Denies fever,  chest pain, shortness of breath, cough,  vomiting, diarrhea, headache,  Dysuria,  rash. No palliative/provocative factors.        Unless otherwise stated in this report or unable to obtain because of the patient's clinical or mental status as evidenced by the medical record, this patient's positive and negative responses for review of systems, constitutional, psych, eyes, ENT, cardiovascular, respiratory, gastrointestinal, neurological, genitourinary, musculoskeletal, integument systems and systems related to the presenting problem are either stated in the preceding paragraph or were not pertinent or were negative for the symptoms and/or complaints related to the medical problem. I have reviewed the following from the nursing documentation:      Prior to Admission medications    Medication Sig Start Date End Date Taking? Authorizing Provider   ALPRAZolam Unice Nigh) 0.5 MG tablet  7/27/21   Historical Provider, MD   lisdexamfetamine (VYVANSE) 50 MG capsule Take 30 mg by mouth every morning. Historical Provider, MD   traMADol (ULTRAM) 50 MG tablet Take 1 tablet by mouth every 6 hours as needed for Pain for up to 5 days. Intended supply: 5 days. Take lowest dose possible to manage pain 7/24/21 7/29/21  Bina Butterfield MD   ondansetron (ZOFRAN ODT) 4 MG disintegrating tablet Take 1-2 tablets by mouth every 12 hours as needed for Nausea May Sub regular tablet (non-ODT) if insurance does not cover ODT. 7/21/21   FREDRICK Ashford CNP   promethazine (PROMETHEGAN) 25 MG suppository Place 1 suppository rectally every 6 hours as needed for Nausea (or vomiting) 7/21/21 7/28/21  FREDRICK Ashford CNP   promethazine (PHENERGAN) 25 MG tablet Take 1 tablet by mouth every 6 hours as needed for Nausea WARNING:  May cause drowsiness. May impair ability to operate vehicles or machinery.   Do not use in combination with alcohol. 7/21/21 7/28/21  FREDRICK Ashford CNP   simvastatin (ZOCOR) 10 MG tablet Take 1 tablet by mouth nightly 7/8/21   FREDRICK Downs CNP   terbinafine (LAMISIL) 250 MG tablet Take 1 tablet by mouth daily 6/17/21 7/29/21  FREDRICK Downs CNP   SUMAtriptan (IMITREX) 100 MG tablet Take 1 tablet by mouth once as needed for Migraine 6/17/21 7/13/21  FREDRICK Ricks - CNP       Allergies as of 07/28/2021 - Fully Reviewed 07/28/2021   Allergen Reaction Noted    Bee venom Shortness Of Breath and Swelling 10/17/2016    Bentyl [dicyclomine hcl] Shortness Of Breath and Anxiety 04/09/2014    Mushroom extract complex Anaphylaxis 10/17/2016    Sulfa antibiotics Shortness Of Breath 01/15/2014    Vancomycin Anaphylaxis and Hives 12/12/2015    Adhesive tape Dermatitis 11/07/2019    Toradol [ketorolac tromethamine] Hives and Itching 08/29/2018    Acetaminophen Anxiety 05/30/2021    Ceftaroline Rash 10/17/2016    Daptomycin Swelling and Rash 10/17/2016    Dicyclomine Anxiety 01/01/2014    Fioricet-codeine [butalbital-apap-caff-cod] Anxiety 07/11/2015    Levaquin [levofloxacin] Anxiety 11/07/2019    Methocarbamol Rash 12/11/2020    Prochlorperazine Anxiety 12/14/2016    Tazobactam Nausea And Vomiting and Anxiety 03/05/2021    Zosyn [piperacillin sod-tazobactam so] Hives 10/31/2019       Past Medical History:   Diagnosis Date    ADHD (attention deficit hyperactivity disorder) 82    Depression with anxiety     Diabetes mellitus ([address]     pre-diabetes    Difficult intubation     Encounter for imaging to screen for metal prior to MRI 06/01/2021    MRI Conditional Medtronic Non-Programmable shunt model#[id number] implanted 10/30/2020 [facility]. Normal Mode. 1.5T or 3.0T.    ESBL (extended spectrum beta-lactamase) producing bacteria infection 11/06/2019    urine    Functional ovarian cysts 2008    rt ovary cyst x 2 yrs.     Headache(784.0)     migraines    History of blood transfusion     at birth    History of kidney stones     History of PCOS     Hydrocephalus ([address]     Hyperlipidemia     Irritable bowel syndrome 2004    had colonoscopy about 6 yrs ago    Meningitis 54/0531    Neutrophilic leukocytosis     Nicotine dependence     PONV (postoperative nausea and vomiting)     very nauseated and sometimes wakes up with a Migraine--happened once after brain surgery    Primary osteoarthritis of left knee 2016    S/P cone biopsy of cervix 2004    Scoliosis .s     (ventriculoperitoneal) shunt status     hydrocephalus f[facility]w Neurosurgeon [facility]     (ventriculoperitoneal) shunt status     Wears glasses     reading        Surgical History:   Past Surgical History:   Procedure Laterality Date    ABDOMEN SURGERY N/A 2021    REMOVAL OF ABDOMINAL WALL MASS performed by Felice Hung MD at [address] [place] [address]      appendicitis     SECTION  2005    placenta previa    CHOLECYSTECTOMY      COLONOSCOPY  2004    COLPOSCOPY      CSF SHUNT      replaced at age 17   [facility] CYSTOSCOPY      stone removal    HEMORRHOID SURGERY      HERNIA REPAIR Bilateral     inguinal    HERNIA REPAIR      hiatal hernia    HYSTERECTOMY, TOTAL ABDOMINAL  2016    TAHBSO, adhesions/PCOS    KNEE ARTHROSCOPY Left 2015    medial meniscectomy, chondroplasty, plica resection    LITHOTRIPSY Bilateral 12/10/2019    OTHER SURGICAL HISTORY  10/19/2016    op lap    VENTRICULOPERITONEAL SHUNT      multiple revisions, most recent         Family History:    Family History   Problem Relation Age of Onset    High Blood Pressure Mother     Diabetes Mother     High Cholesterol Mother     Depression Mother     Diabetes Maternal Grandmother     High Blood Pressure Maternal Grandmother     High Cholesterol Maternal Grandmother     Heart Disease Maternal Grandfather     High Blood Pressure Maternal Grandfather     Heart Disease Paternal Grandmother     Stroke Paternal Grandfather     Depression Sister     Cirrhosis Father     Rheum Arthritis Neg Hx     Osteoarthritis Neg Hx     Asthma Neg Hx     Breast Cancer Neg Hx     Cancer Neg Hx     Heart Failure Neg Hx     Hypertension Neg Hx     Migraines Neg Hx     Ovarian Cancer Neg Hx  Rashes/Skin Problems Neg Hx     Seizures Neg Hx     Thyroid Disease Neg Hx        Social History     Socioeconomic History    Marital status: Single     Spouse name: Not on file    Number of children: Not on file    Years of education: Not on file    Highest education level: Not on file   Occupational History    Occupation: disability, SSI    Tobacco Use    Smoking status: Current Some Day Smoker     Packs/day: 0.50     Years: 18.00     Pack years: 9.00     Types: Cigarettes    Smokeless tobacco: Never Used   Vaping Use    Vaping Use: Never used   Substance and Sexual Activity    Alcohol use: No     Alcohol/week: 0.0 standard drinks    Drug use: Never    Sexual activity: Not Currently     Partners: Male   Other Topics Concern    Not on file   Social History Narrative    Not on file     Social Determinants of Health     Financial Resource Strain: Low Risk     Difficulty of Paying Living Expenses: Not hard at all   Food Insecurity: No Food Insecurity    Worried About Running Out of Food in the Last Year: Never true    [place] of Food in the Last Year: Never true   Transportation Needs:     Lack of Transportation (Medical):  Lack of Transportation (Non-Medical):    Physical Activity:     Days of Exercise per Week:     Minutes of Exercise per Session:    Stress:     Feeling of Stress :    Social Connections:     Frequency of Communication with Friends and Family:     Frequency of Social Gatherings with Friends and Family:     Attends Evangelical Services:     Active Member of Clubs or Organizations:     Attends Club or Organization Meetings:     Marital Status:    Intimate Partner Violence:     Fear of Current or Ex-Partner:     Emotionally Abused:     Physically Abused:     Sexually Abused:        Nursing notes reviewed.     ED Triage Vitals [07/28/21 0411]   Enc Vitals Group      BP (!) 145/100      Pulse 113      Resp 20      Temp 98.4 °F (36.9 °C)      Temp Source Oral      SpO2 100 %      Weight 162 lb 4.1 oz (73.6 kg)      Height 4' 11\" (1.499 m)      Head Circumference       Peak Flow       Pain Score       Pain Loc       Pain Edu? Excl. in [address]? GENERAL:   Body mass index is 32.77 kg/m². Awake, alert. Well developed, well nourished with no apparent distress. Nontoxic-appearing, non-ill-appearing. HENT:   Normocephalic, Atraumatic, no lacerations. No ENT exam due to PPE. EYES:   Conjunctiva normal,   Pupils equal round and reactive to light,   Extraocular movements normal.  NECK:  Trachea is midline. No stridor. CHEST:  Regular rate and regular rhythm, no murmurs/rubs/gallops,  normal S1/S2, chest wall non-tender. LUNGS:  No respiratory distress. No abdominal retractions, no sternal retractions  Clear to auscultation bilaterally, no wheezing, no rhochi, no rales  Speaking comfortably in full sentences  ABDOMEN:  Soft, non-tender, non-distended. Palpation over the right lower quadrant did not make the pain worse  No guarding. No rebound. No costovertebral angle tenderness to palpation. Normal BS, no organomegaly, no abdominal masses  On the very inferior portion of the right lower quadrant there is a surgical incision that is well-healed, clean, dry, intact without any surrounding erythema or fluctuance  EXTREMITIES:  Moves extremities x4 with purpose. Normal range of motion, no edema,  No tenderness, no deformity,  distal pulses present and equal bilaterally. BACK:  No midline tenderness in the cervical, thoracic, and lumbar spine. No deformities, no step-off. SKIN:  Warm, dry and intact. NEUROLOGIC:  Normal mental status. Moving all extremities to command. Alert and oriented x4  without focal motor deficit or gross sensory deficit. Normal speech.   PSYCHIATRIC:  Not anxious,  normal mood and affect,  Appropriate eye contact,  thoughts are linear and organized,  without delusions/hallucinations,  Not responding to internal stimuli,  responds appropriately to questions    LABS and DIAGNOSTIC RESULTS    RADIOLOGY  X-RAYS:  I have reviewed radiologic plain film image(s). ALL OTHER NON-PLAIN FILM IMAGES SUCH AS CT, ULTRASOUND AND MRI HAVE BEEN READ BY THE RADIOLOGIST. CT ABDOMEN PELVIS WO CONTRAST Additional Contrast? None   Preliminary Result   Mild infiltration of the subcutaneous fat of the lower abdominal/pelvic wall   anteriorly as seen on prior examination. This may represent postsurgical   changes although cellulitis is in the differential.  No fluid collection. 3 mm nonobstructing right renal stone. No hydronephrosis.               LABS  Results for orders placed or performed during the hospital encounter of 07/28/21   CBC Auto Differential   Result Value Ref Range    WBC 16.2 (H) 4.0 - 11.0 K/uL    RBC 5.17 4.00 - 5.20 M/uL    Hemoglobin 15.1 12.0 - 16.0 g/dL    Hematocrit 44.5 36.0 - 48.0 %    MCV 86.1 80.0 - 100.0 fL    MCH 29.2 26.0 - 34.0 pg    MCHC 33.9 31.0 - 36.0 g/dL    RDW 15.2 12.4 - 15.4 %    Platelets 174 656 - 208 K/uL    MPV 7.4 5.0 - 10.5 fL    Neutrophils % 67.9 %    Lymphocytes % 22.6 %    Monocytes % 7.0 %    Eosinophils % 1.8 %    Basophils % 0.7 %    Neutrophils Absolute 11.0 (H) 1.7 - 7.7 K/uL    Lymphocytes Absolute 3.7 1.0 - 5.1 K/uL    Monocytes Absolute 1.1 0.0 - 1.3 K/uL    Eosinophils Absolute 0.3 0.0 - 0.6 K/uL    Basophils Absolute 0.1 0.0 - 0.2 K/uL   Comprehensive Metabolic Panel w/ Reflex to MG   Result Value Ref Range    Sodium 140 136 - 145 mmol/L    Potassium reflex Magnesium 3.7 3.5 - 5.1 mmol/L    Chloride 101 99 - 110 mmol/L    CO2 26 21 - 32 mmol/L    Anion Gap 13 3 - 16    Glucose 139 (H) 70 - 99 mg/dL    BUN 13 7 - 20 mg/dL    CREATININE 0.7 0.6 - 1.1 mg/dL    GFR Non-African American >60 >60    GFR African American >60 >60    Calcium 10.0 8.3 - 10.6 mg/dL    Total Protein 8.1 6.4 - 8.2 g/dL    Albumin 4.5 3.4 - 5.0 g/dL    Albumin/Globulin Ratio 1.3 1.1 - 2.2    Total Bilirubin <0.2 0.0 - 1.0 mg/dL    Alkaline 14/14    Differential Diagnosis: Kidney Stone, Pyelonephritis, Renal Artery Aneurysm,  Abdominal Aortic Aneurysm, Metastases to back, Mechanical Back Pain, Cauda Equina Syndrome    Patient presents to ED for evaluation of acute flank pain. No history of direct trauma. Neurovascular exam in the ER is unremarkable for any deficits. Vitals signs have been reviewed and are stable. They are afebrile and nontoxic appearing, but in moderate distress due to pain. Appropriate labs and imaging studies were ordered. Pain was addressed with pain medication. On reevaluation patient is feeling improved. Based on the history and exam, there is no significant evidence of fracture, spinal cord compression, central disc herniation, cauda equina syndrome, osteomyelitis, epidural abscess, epidural hematoma, other focal infection, mass, tumor, unstable spinal column, AAA or dissection, UTI, pyelonephritis, appendicitis, biliary disease or other process requiring immediate medical or surgical intervention at this time. Based on the history, exam, and ED work-up, it appears the patients symptoms are due to renal colic. At this time I feel they are stable for d/c home with pain has been controlled, their v/s are stable, and there is no sign of a super-imposed infection or obstruction. The diagnosis, expected course, discharge medications, and follow-up plan (with their PMD and an urologist) were discussed. It is understood that if they are not improving as expected or if other new symptoms or signs of concern develop, other etiologies or diagnoses may need to be considered requiring other tests, treatments, consultations, and/or admission. Return precautions were discussed and all questions were answered. Final Impression    1. Right flank pain    2. Nephrolithiasis        Blood pressure 114/89, pulse 84, temperature 98.4 °F (36.9 °C), temperature source Oral, resp.  rate 16, height 4' 11\" (1.499 m), weight 162 lb 4.1 oz (73.6 kg), last menstrual period 10/31/2016, SpO2 100 %, not currently breastfeeding. Medication Summary  Patient was given scripts for the following medications. I counseled patient how to take these medications. New Prescriptions    ONDANSETRON (ZOFRAN-ODT) 4 MG DISINTEGRATING TABLET    Place 1 tablet under the tongue every 8 hours as needed for Nausea or Vomiting May Sub regular tablet (non-ODT) if insurance does not cover ODT. OXYCODONE-ACETAMINOPHEN (PERCOCET) 5-325 MG PER TABLET    Take 1 tablet by mouth every 6 hours as needed for Pain for up to 7 days. Patient had scripts modified or refilled for the following medications. I counseled patient how to take these medications. Modified Medications    No medications on file       Patient had scripts discontinues for the following medications. I counseled patient to stop taking these medications. Discontinued Medications    ONDANSETRON (ZOFRAN ODT) 4 MG DISINTEGRATING TABLET    Take 1-2 tablets by mouth every 12 hours as needed for Nausea May Sub regular tablet (non-ODT) if insurance does not cover ODT. Disposition  At this point I do not feel the patient requires further work up and it is reasonable to discharge the patient. I had a discussion with the patient and/or their surrogate regarding diagnosis, diagnostic testing results, treatment/ plan of care, and follow up. Patient and/or companions verbalized understanding of the ED workup, any relevant findings as well as any incidental findings, and the disposition and plan. There was shared decision-making between myself as well as the patient and/or their surrogate and we are all in agreement with discharge home. Palmer Landry was an opportunity for questions and all questions were answered to the best of my ability and to the satisfaction of the patient and/or patient's family. Patient agreed to follow up as recommend for further evaluation/treatment.  The patient was given strict return precautions as we discussed symptoms that would necessitate return to the ED. Patient will return to ED for new/worsening symptoms. The patient verbalized their understanding and agreement with the above plan. Please refer to AVS for further details regarding discharge instructions. Pt is in stable condition upon Discharge to home. The note was completed using Dragon voice recognition transcription. Every effort was made to ensure accuracy; however, inadvertent transcription errors may be present despite my best efforts to edit errors.     Wan Borja MD  [facility]        Wan Borja MD  07/28/21 [id number]

## 2021-08-04 ENCOUNTER — HOSPITAL ENCOUNTER (EMERGENCY)
Age: 41
Discharge: HOME | End: 2021-08-04
Attending: EMERGENCY MEDICINE
Payer: COMMERCIAL

## 2021-08-04 ENCOUNTER — APPOINTMENT (OUTPATIENT)
Dept: GENERAL RADIOLOGY | Age: 41
End: 2021-08-04
Payer: COMMERCIAL

## 2021-08-04 ENCOUNTER — APPOINTMENT (OUTPATIENT)
Dept: CT IMAGING | Age: 41
End: 2021-08-04
Payer: COMMERCIAL

## 2021-08-04 VITALS
OXYGEN SATURATION: 98 % | DIASTOLIC BLOOD PRESSURE: 81 MMHG | RESPIRATION RATE: 15 BRPM | SYSTOLIC BLOOD PRESSURE: 116 MMHG | HEART RATE: 88 BPM | TEMPERATURE: 98.9 F

## 2021-08-04 DIAGNOSIS — R51.9 PRESSURE IN HEAD: Primary | ICD-10-CM

## 2021-08-04 LAB
ANION GAP SERPL CALCULATED.3IONS-SCNC: 14 MMOL/L (ref 3–16)
BASOPHILS ABSOLUTE: 0 K/UL (ref 0–0.2)
BASOPHILS RELATIVE PERCENT: 0.5 %
BUN BLDV-MCNC: 9 MG/DL (ref 7–20)
CALCIUM SERPL-MCNC: 9.6 MG/DL (ref 8.3–10.6)
CHLORIDE BLD-SCNC: 101 MMOL/L (ref 99–110)
CO2: 24 MMOL/L (ref 21–32)
CREAT SERPL-MCNC: 0.7 MG/DL (ref 0.6–1.1)
EKG ATRIAL RATE: 108 BPM
EKG DIAGNOSIS: NORMAL
EKG P AXIS: 67 DEGREES
EKG P-R INTERVAL: 124 MS
EKG Q-T INTERVAL: 336 MS
EKG QRS DURATION: 76 MS
EKG QTC CALCULATION (BAZETT): 450 MS
EKG R AXIS: 49 DEGREES
EKG T AXIS: 54 DEGREES
EKG VENTRICULAR RATE: 108 BPM
EOSINOPHILS ABSOLUTE: 0.1 K/UL (ref 0–0.6)
EOSINOPHILS RELATIVE PERCENT: 1.4 %
GFR AFRICAN AMERICAN: >60
GFR NON-AFRICAN AMERICAN: >60
GLUCOSE BLD-MCNC: 154 MG/DL (ref 70–99)
HCG QUALITATIVE: NEGATIVE
HCT VFR BLD CALC: 42.7 % (ref 36–48)
HEMOGLOBIN: 14.8 G/DL (ref 12–16)
LACTIC ACID, SEPSIS: 1.5 MMOL/L (ref 0.4–1.9)
LYMPHOCYTES ABSOLUTE: 2.6 K/UL (ref 1–5.1)
LYMPHOCYTES RELATIVE PERCENT: 27.5 %
MCH RBC QN AUTO: 29.9 PG (ref 26–34)
MCHC RBC AUTO-ENTMCNC: 34.6 G/DL (ref 31–36)
MCV RBC AUTO: 86.5 FL (ref 80–100)
MONOCYTES ABSOLUTE: 0.3 K/UL (ref 0–1.3)
MONOCYTES RELATIVE PERCENT: 2.7 %
NEUTROPHILS ABSOLUTE: 6.4 K/UL (ref 1.7–7.7)
NEUTROPHILS RELATIVE PERCENT: 67.9 %
PDW BLD-RTO: 15.3 % (ref 12.4–15.4)
PLATELET # BLD: 309 K/UL (ref 135–450)
PMV BLD AUTO: 7.9 FL (ref 5–10.5)
POTASSIUM REFLEX MAGNESIUM: 3.6 MMOL/L (ref 3.5–5.1)
RBC # BLD: 4.94 M/UL (ref 4–5.2)
SODIUM BLD-SCNC: 139 MMOL/L (ref 136–145)
WBC # BLD: 9.4 K/UL (ref 4–11)

## 2021-08-04 PROCEDURE — 36415 COLL VENOUS BLD VENIPUNCTURE: CPT

## 2021-08-04 PROCEDURE — 83605 ASSAY OF LACTIC ACID: CPT

## 2021-08-04 PROCEDURE — 70360 X-RAY EXAM OF NECK: CPT

## 2021-08-04 PROCEDURE — 96376 TX/PRO/DX INJ SAME DRUG ADON: CPT

## 2021-08-04 PROCEDURE — 96375 TX/PRO/DX INJ NEW DRUG ADDON: CPT

## 2021-08-04 PROCEDURE — 93005 ELECTROCARDIOGRAM TRACING: CPT | Performed by: NURSE PRACTITIONER

## 2021-08-04 PROCEDURE — 96374 THER/PROPH/DIAG INJ IV PUSH: CPT

## 2021-08-04 PROCEDURE — 80048 BASIC METABOLIC PNL TOTAL CA: CPT

## 2021-08-04 PROCEDURE — 6360000002 HC RX W HCPCS: Performed by: NURSE PRACTITIONER

## 2021-08-04 PROCEDURE — 70450 CT HEAD/BRAIN W/O DYE: CPT

## 2021-08-04 PROCEDURE — 84703 CHORIONIC GONADOTROPIN ASSAY: CPT

## 2021-08-04 PROCEDURE — 99284 EMERGENCY DEPT VISIT MOD MDM: CPT

## 2021-08-04 PROCEDURE — 85025 COMPLETE CBC W/AUTO DIFF WBC: CPT

## 2021-08-04 RX ORDER — ONDANSETRON 2 MG/ML
4 INJECTION INTRAMUSCULAR; INTRAVENOUS ONCE
Status: COMPLETED | OUTPATIENT
Start: 2021-08-04 | End: 2021-08-04

## 2021-08-04 RX ORDER — PROMETHAZINE HYDROCHLORIDE 25 MG/ML
12.5 INJECTION, SOLUTION INTRAMUSCULAR; INTRAVENOUS ONCE
Status: COMPLETED | OUTPATIENT
Start: 2021-08-04 | End: 2021-08-04

## 2021-08-04 RX ORDER — MORPHINE SULFATE 4 MG/ML
4 INJECTION, SOLUTION INTRAMUSCULAR; INTRAVENOUS ONCE
Status: COMPLETED | OUTPATIENT
Start: 2021-08-04 | End: 2021-08-04

## 2021-08-04 RX ADMIN — ONDANSETRON 4 MG: 2 INJECTION INTRAMUSCULAR; INTRAVENOUS at 13:42

## 2021-08-04 RX ADMIN — PROMETHAZINE HYDROCHLORIDE 12.5 MG: 25 INJECTION INTRAMUSCULAR; INTRAVENOUS at 15:45

## 2021-08-04 RX ADMIN — MORPHINE SULFATE 4 MG: 4 INJECTION INTRAVENOUS at 13:41

## 2021-08-04 RX ADMIN — MORPHINE SULFATE 4 MG: 4 INJECTION INTRAVENOUS at 14:58

## 2021-08-04 ASSESSMENT — PAIN SCALES - WONG BAKER
WONGBAKER_NUMERICALRESPONSE: 6
WONGBAKER_NUMERICALRESPONSE: 8

## 2021-08-04 ASSESSMENT — PAIN SCALES - GENERAL
PAINLEVEL_OUTOF10: 10
PAINLEVEL_OUTOF10: 6
PAINLEVEL_OUTOF10: 8

## 2021-08-04 NOTE — ED PROVIDER NOTES
ED Attending Attestation Note     Date of evaluation: 8/4/2021    This patient was seen by the advance practice provider. I have seen and examined the patient, agree with the workup, evaluation, management and diagnosis. The care plan has been discussed. My assessment reveals A&O, mild neck stiffness, erythema to right eyelid with no proptosis and intact EOM, streak of trace erythema left neck without overlying induration or crepitance, neuro intact.       Ayde Bermudez MD  08/04/21 [address] [facility] MD Siva  08/04/21 [id number]

## 2021-08-04 NOTE — ED PROVIDER NOTES
[facility]          NURSE PRACTITIONER NOTE     Date of evaluation: 8/4/2021    Chief Complaint     Headache (states she has a  shunt and has had a lot of \"pressure\" on the left side of her head, states not pain but pressure. states emesis started last night. states left eye swelling started 2 days ago and it happened the last time she had to have a shunt revision.)      History of Present Illness     Collette Smack is a 47 y.o. female with a PMH of hydrocephalus,  shunt, prediabetes, meningitis, who has had infected shunt before who presents to the emergency department with 3 to 4 days of worsening left-sided head pressure, neck stiffness, dizziness, vomiting, and a red streak that appeared behind her left ear (the site her shunt is on) which was similar to the last time she had a shunt infection. She complains of severe head pressure that is unrelieved by Tylenol at home. Denies fevers or chills. Does have a lot of pain with flexion extension of the neck and head turning. Also notes some left-sided eye swelling that started 2 days ago without vision changes. Says is a little painful, not itchy. No drainage. With the exception of the above, there are no aggravating or alleviating factors. Review of Systems     Pertinent positive and negative findings as documented above in HPI, otherwise all other systems were reviewed and negative     Past Medical, Surgical, Family, and Social History     Medical History:   Past Medical History:   Diagnosis Date    ADHD (attention deficit hyperactivity disorder) 82    Depression with anxiety     Diabetes mellitus ([facility] [address]     pre-diabetes    Difficult intubation     Encounter for imaging to screen for metal prior to MRI 06/01/2021    MRI Conditional Medtronic Non-Programmable shunt model#85021 implanted 10/30/2020 at [facility]. Normal Mode.  1.5T or 3.0T.    ESBL (extended spectrum beta-lactamase) producing bacteria infection 2019    urine    Functional ovarian cysts 2008    rt ovary cyst x 2 yrs.  Headache(784.0)     migraines    History of blood transfusion     at birth   Rodriguez History of kidney stones     History of PCOS     Hydrocephalus ([address]     Hyperlipidemia     Irritable bowel syndrome 2004    had colonoscopy about 6 yrs ago    Meningitis     Neutrophilic leukocytosis     Nicotine dependence     PONV (postoperative nausea and vomiting)     very nauseated and sometimes wakes up with a Migraine--happened once after brain surgery    Primary osteoarthritis of left knee 2016    S/P cone biopsy of cervix 2004    Scoliosis .s     (ventriculoperitoneal) shunt status     hydrocephalus f/w Neurosurgeon at [facility]     (ventriculoperitoneal) shunt status     Wears glasses     reading       Surgical History:   Past Surgical History:   Procedure Laterality Date    ABDOMEN SURGERY N/A 2021    REMOVAL OF ABDOMINAL WALL MASS performed by Mathew Young MD at [address] [place] [address]      appendicitis     SECTION      placenta previa    CHOLECYSTECTOMY      COLONOSCOPY      COLPOSCOPY      CSF SHUNT      replaced at age 17   Rodriguez CYSTOSCOPY      stone removal    HEMORRHOID SURGERY      HERNIA REPAIR Bilateral     inguinal    HERNIA REPAIR  2015    hiatal hernia    HYSTERECTOMY, TOTAL ABDOMINAL  2016    TAHBSO, adhesions/PCOS    KNEE ARTHROSCOPY Left 2015    medial meniscectomy, chondroplasty, plica resection    LITHOTRIPSY Bilateral 12/10/2019    OTHER SURGICAL HISTORY  10/19/2016    op lap    VENTRICULOPERITONEAL SHUNT      multiple revisions, most recent        Social History: She reports that she has been smoking cigarettes. She has a 9.00 pack-year smoking history. She has never used smokeless tobacco. She reports that she does not drink alcohol and does not use drugs.     Family History: Her family history includes Cirrhosis in her father; Depression in her mother and sister; Diabetes in her maternal grandmother and mother; Heart Disease in her maternal grandfather and paternal grandmother; High Blood Pressure in her maternal grandfather, maternal grandmother, and mother; High Cholesterol in her maternal grandmother and mother; Stroke in her paternal grandfather. Medications     Previous Medications    ALPRAZOLAM (XANAX) 0.5 MG TABLET        LISDEXAMFETAMINE (VYVANSE) 50 MG CAPSULE    Take 30 mg by mouth every morning. ONDANSETRON (ZOFRAN-ODT) 4 MG DISINTEGRATING TABLET    Place 1 tablet under the tongue every 8 hours as needed for Nausea or Vomiting May Sub regular tablet (non-ODT) if insurance does not cover ODT. OXYCODONE-ACETAMINOPHEN (PERCOCET) 5-325 MG PER TABLET    Take 1 tablet by mouth every 6 hours as needed for Pain for up to 7 days. SIMVASTATIN (ZOCOR) 10 MG TABLET    Take 1 tablet by mouth nightly    SUMATRIPTAN (IMITREX) 100 MG TABLET    Take 1 tablet by mouth once as needed for Migraine       Allergies     Allergies:    Allergies as of 08/04/2021 - Fully Reviewed 08/04/2021   Allergen Reaction Noted    Bee venom Shortness Of Breath and Swelling 10/17/2016    Bentyl [dicyclomine hcl] Shortness Of Breath and Anxiety 04/09/2014    Mushroom extract complex Anaphylaxis 10/17/2016    Sulfa antibiotics Shortness Of Breath 01/15/2014    Vancomycin Anaphylaxis and Hives 12/12/2015    Adhesive tape Dermatitis 11/07/2019    Toradol [ketorolac tromethamine] Hives and Itching 08/29/2018    Acetaminophen Anxiety 05/30/2021    Ceftaroline Rash 10/17/2016    Daptomycin Swelling and Rash 10/17/2016    Dicyclomine Anxiety 01/01/2014    Fioricet-codeine [butalbital-apap-caff-cod] Anxiety 07/11/2015    Levaquin [levofloxacin] Anxiety 11/07/2019    Methocarbamol Rash 12/11/2020    Prochlorperazine Anxiety 12/14/2016    Tazobactam Nausea And Vomiting and Anxiety 03/05/2021    Zosyn [piperacillin sod-tazobactam so] Hives 10/31/2019        Physical Exam     INITIAL VITALS: BP: (!) 142/95, Temp: 98.9 °F (37.2 °C), Pulse: 115, Resp: 20, SpO2: 100 %     General: 47 y.o. female in no apparent distress, well developed, well nourished, non-toxic appearance. HEENT: Atraumatic, normocephalic. EOMs intact. Neck:  Full range of motion. Chest/pulm: Respiratory rate normal. Speaks in complete sentences, no respiratory distress, lungs CTA bilaterally, no wheezes, rales, rhonchi. Cardiovascular: Heart rate normal. RRR, no murmurs, rubs, gallops     Abdomen: No gross distension. Soft, non-tender, non-peritoneal.     : Deferred. Musculoskeletal: No obvious joint deformity. Ambulates without difficulty. Neuro: A&O x4. GCS 15. CN II-XII grossly intact. No focal neuro deficits. Speech clear and appropriate. Gait normal. DTRs brisk and equal bilaterally. Moves all extremities spontaneously. Normal muscle strength and tone. Normal finger-to-nose coordination. Negative Romberg. No pronator drift. Does not want to flex and extend her neck, can flex approximately 15 degrees in either way. Does not want to turn her head side     Skin: Warm, dry. No obvious rashes, petechiae, or purpura. Psych: Appropriate mood and affect, normal interaction. Diagnostic Results     RADIOLOGY:  CT HEAD WO CONTRAST   Final Result      1. No acute intracranial process. 2.  Unchanged appearance of left frontal approach  shunt catheter and nondilated ventricles. XR SHUNT SERIES PLACEMENT (<4 VIEWS)   Final Result      6 views demonstrate left-sided ventriculoperitoneal shunt tubing terminating in the left lower abdomen. There is no visualized kink or break of the catheter. Moderate thoracic dextroscoliosis. Mild lumbar levoscoliosis.           LABS:   Results for orders placed or performed during the hospital encounter of 08/04/21   CBC Auto Differential   Result Value Ref Range    WBC 9.4 4.0 - 11.0 K/uL    RBC 4.94 4.00 - 5.20 M/uL    Hemoglobin 14.8 12.0 - 16.0 g/dL    Hematocrit 42.7 36.0 - 48.0 %    MCV 86.5 80.0 - 100.0 fL    MCH 29.9 26.0 - 34.0 pg    MCHC 34.6 31.0 - 36.0 g/dL    RDW 15.3 12.4 - 15.4 %    Platelets 055 695 - 085 K/uL    MPV 7.9 5.0 - 10.5 fL    Neutrophils % 67.9 %    Lymphocytes % 27.5 %    Monocytes % 2.7 %    Eosinophils % 1.4 %    Basophils % 0.5 %    Neutrophils Absolute 6.4 1.7 - 7.7 K/uL    Lymphocytes Absolute 2.6 1.0 - 5.1 K/uL    Monocytes Absolute 0.3 0.0 - 1.3 K/uL    Eosinophils Absolute 0.1 0.0 - 0.6 K/uL    Basophils Absolute 0.0 0.0 - 0.2 K/uL   Basic Metabolic Panel w/ Reflex to MG   Result Value Ref Range    Sodium 139 136 - 145 mmol/L    Potassium reflex Magnesium 3.6 3.5 - 5.1 mmol/L    Chloride 101 99 - 110 mmol/L    CO2 24 21 - 32 mmol/L    Anion Gap 14 3 - 16    Glucose 154 (H) 70 - 99 mg/dL    BUN 9 7 - 20 mg/dL    CREATININE 0.7 0.6 - 1.1 mg/dL    GFR Non-African American >60 >60    GFR African American >60 >60    Calcium 9.6 8.3 - 10.6 mg/dL   HCG Qualitative, Serum   Result Value Ref Range    hCG Qual Negative Detects HCG level >10 MIU/mL   Lactate, Sepsis   Result Value Ref Range    Lactic Acid, Sepsis 1.5 0.4 - 1.9 mmol/L   EKG 12 Lead   Result Value Ref Range    Ventricular Rate 108 BPM    Atrial Rate 108 BPM    P-R Interval 124 ms    QRS Duration 76 ms    Q-T Interval 336 ms    QTc Calculation (Bazett) 450 ms    P Axis 67 degrees    R Axis 49 degrees    T Axis 54 degrees    Diagnosis       EKG performed in ER and to be interpreted by ER physician. Confirmed by MD, ER ([id number]),  Prachi Hutchinson ([id number]) on 8/4/2021 1:34:01 PM       RECENT VITALS:  BP: 116/81, Temp: 98.9 °F (37.2 °C), Pulse: 88, Resp: 15, SpO2: 98 %     Procedures     None     ED Course     Nursing Notes, Past Medical Hx, Past Surgical Hx, Social Hx, Allergies, and Family Hx were reviewed.     The patient was given the following medications:  Orders Placed This Encounter   Medications  morphine injection 4 mg    ondansetron (ZOFRAN) injection 4 mg    morphine injection 4 mg    promethazine (PHENERGAN) injection 12.5 mg            CONSULTS:  Isha Butler  IP CONSULT TO Vu Riddle / ASSESSMENT / PLAN     Briefly, Maranda Marroquin is a 47 y.o. female who presented to the emergency department with head pressure and concern for shunt problem. On presentation, she is slightly uncomfortable appearing, no acute distress. She is afebrile, tachycardia to 115, with otherwise stable vital signs. Her neuro exam was nonfocal.  She does have some mild injection behind her left ear without evidence of mastoid tenderness, otitis externa, otitis media. She also has some erythema around her left eye that looks irritated, not frankly infected, with normal extraocular movements, no proptosis, no ocular drainage. May be irritant/allergy. With her mild neck stiffness, some consideration was given acute intracranial pathology including shunt infection, meningitis, etc.  Laboratory studies showed no leukocytosis, stable renal function, negative hCG, and negative lactate. CT of the head was obtained which showed nondilated ventricles. Shunt series showed no evidence of shunt kink or dysfunction. The patient was discussed with neurosurgery who knows the patient well. Based on her previous presentations and reassuring laboratory studies today, they do not believe that acute intervention is required including shunt tap, biotics, or operative management. They suggest that she follow-up as an outpatient. I relayed this to the patient. The patient had concerns about doing this due to her physician being out of town. I touch base with neurosurgery who came down to see the patient, explained that they did not believe that this was a shunt problem potentially an allergic reaction to silicone, and again recommended that she follow-up for outpatient allergy testing.   Her symptoms improved with morphine, Zofran, Phenergan here in the emergency department. Her neuro exam remained stable. She is systemically well. Her tachycardia resolved. At this point in time, believe the patient is stable for outpatient follow-up. At this point in time, patient is stable for discharge. Patient was given strict return precautions as outlined in the AVS. Patient was agreeable and understanding to this plan of care. Prior to discharge, patient was ambulatory and PO tolerant. The patient was evaluated by myself and the ED Attending Physician, Dr. Trey Elmore All management and disposition plans were discussed and agreed upon. Clinical Impression     1.  Pressure in head        Disposition     DC    DISCHARGE MEDICATIONS:  New Prescriptions    No medications on file       PATIENT REFERRED TO:  [facility] Emergency Department  [address]  [address]  [phone]    As needed, If symptoms worsen    Franky Florida, APRN - CNP  [address], APRN - CNP, CNP  Department of Emergency Medicine     [address] [place] [address]  08/04/21 223

## 2021-08-08 ENCOUNTER — PATIENT MESSAGE (OUTPATIENT)
Dept: PRIMARY CARE CLINIC | Age: 41
End: 2021-08-08

## 2021-08-08 DIAGNOSIS — B35.1 NAIL FUNGAL INFECTION: ICD-10-CM

## 2021-08-09 RX ORDER — TERBINAFINE HYDROCHLORIDE 250 MG/1
250 TABLET ORAL DAILY
Qty: 42 TABLET | Refills: 0 | Status: SHIPPED | OUTPATIENT
Start: 2021-08-09 | End: 2021-09-12 | Stop reason: ALTCHOICE

## 2021-08-09 NOTE — TELEPHONE ENCOUNTER
From: Luna Laureano  To: FREDRICK Vega - CNP  Sent: 8/8/2021 7:59 PM EDT  Subject: Prescription Question    That medicine you gave me for my foot. The Lamasil I need a refill of that. I was going to call mine in and can't find the bottle to call it in. Are you able too?

## 2021-08-11 ENCOUNTER — OFFICE VISIT (OUTPATIENT)
Dept: GYNECOLOGY | Age: 41
End: 2021-08-11
Payer: COMMERCIAL

## 2021-08-11 VITALS
HEIGHT: 59 IN | SYSTOLIC BLOOD PRESSURE: 128 MMHG | WEIGHT: 164.4 LBS | TEMPERATURE: 97.4 F | HEART RATE: 105 BPM | DIASTOLIC BLOOD PRESSURE: 90 MMHG | BODY MASS INDEX: 33.14 KG/M2

## 2021-08-11 DIAGNOSIS — Z01.419 WELL WOMAN EXAM WITH ROUTINE GYNECOLOGICAL EXAM: Primary | ICD-10-CM

## 2021-08-11 PROCEDURE — 99385 PREV VISIT NEW AGE 18-39: CPT | Performed by: OBSTETRICS & GYNECOLOGY

## 2021-08-11 NOTE — PROGRESS NOTES
Subjective:      Patient ID: Luna Laureano is a 47 y.o. female. HPI  pts here for annual gyn exam.  She denies gyn complaints. Recently moved back from [place]. Review of Systems  Pertinent review of systems items discussed above. All others systems items not discussed above were negative. Objective:   Physical Exam  Constitutional:       Appearance: She is well-developed. HENT:      Head: Normocephalic and atraumatic. Neck:      Thyroid: No thyromegaly. Trachea: No tracheal deviation. Cardiovascular:      Rate and Rhythm: Normal rate and regular rhythm. Heart sounds: Normal heart sounds. No murmur heard. Pulmonary:      Effort: Pulmonary effort is normal. No respiratory distress. Breath sounds: Normal breath sounds. No wheezing or rales. Chest:      Breasts:         Right: No mass, nipple discharge or skin change. Left: No mass, nipple discharge or skin change. Abdominal:      General: There is no distension. Palpations: Abdomen is soft. There is no mass. Tenderness: There is no abdominal tenderness. There is no rebound. Genitourinary:     Labia:         Right: No lesion. Left: No lesion. Vagina: Normal. No foreign body. No vaginal discharge. Adnexa:         Right: No mass or tenderness. Left: No mass or tenderness. Rectum: Normal. No external hemorrhoid. Comments: Pap performed. Musculoskeletal:         General: Normal range of motion. Lymphadenopathy:      Cervical: No cervical adenopathy. Neurological:      Mental Status: She is alert and oriented to person, place, and time. Assessment:   Normal gyn exam     Plan:   F/u annual gyn exam.  Call with results.        Letty Wang MD

## 2021-08-17 ENCOUNTER — APPOINTMENT (OUTPATIENT)
Dept: CT IMAGING | Age: 41
End: 2021-08-17
Payer: COMMERCIAL

## 2021-08-17 ENCOUNTER — HOSPITAL ENCOUNTER (EMERGENCY)
Age: 41
Discharge: HOME | End: 2021-08-17
Attending: EMERGENCY MEDICINE
Payer: COMMERCIAL

## 2021-08-17 VITALS
DIASTOLIC BLOOD PRESSURE: 96 MMHG | WEIGHT: 162.92 LBS | OXYGEN SATURATION: 99 % | TEMPERATURE: 99 F | SYSTOLIC BLOOD PRESSURE: 138 MMHG | HEIGHT: 59 IN | HEART RATE: 87 BPM | BODY MASS INDEX: 32.84 KG/M2 | RESPIRATION RATE: 14 BRPM

## 2021-08-17 DIAGNOSIS — Z98.2 VP (VENTRICULOPERITONEAL) SHUNT STATUS: ICD-10-CM

## 2021-08-17 DIAGNOSIS — R10.12 LEFT UPPER QUADRANT ABDOMINAL PAIN: Primary | ICD-10-CM

## 2021-08-17 LAB
A/G RATIO: 1.2 (ref 1.1–2.2)
ALBUMIN SERPL-MCNC: 4.3 G/DL (ref 3.4–5)
ALP BLD-CCNC: 146 U/L (ref 40–129)
ALT SERPL-CCNC: 40 U/L (ref 10–40)
ANION GAP SERPL CALCULATED.3IONS-SCNC: 11 MMOL/L (ref 3–16)
AST SERPL-CCNC: 25 U/L (ref 15–37)
BASOPHILS ABSOLUTE: 0.1 K/UL (ref 0–0.2)
BASOPHILS RELATIVE PERCENT: 0.7 %
BILIRUB SERPL-MCNC: 0.3 MG/DL (ref 0–1)
BILIRUBIN URINE: NEGATIVE
BLOOD, URINE: NEGATIVE
BUN BLDV-MCNC: 9 MG/DL (ref 7–20)
CALCIUM SERPL-MCNC: 9.9 MG/DL (ref 8.3–10.6)
CHLORIDE BLD-SCNC: 102 MMOL/L (ref 99–110)
CLARITY: CLEAR
CO2: 26 MMOL/L (ref 21–32)
COLOR: YELLOW
CREAT SERPL-MCNC: 0.7 MG/DL (ref 0.6–1.1)
EOSINOPHILS ABSOLUTE: 0 K/UL (ref 0–0.6)
EOSINOPHILS RELATIVE PERCENT: 0.5 %
GFR AFRICAN AMERICAN: >60
GFR NON-AFRICAN AMERICAN: >60
GLOBULIN: 3.5 G/DL
GLUCOSE BLD-MCNC: 142 MG/DL (ref 70–99)
GLUCOSE URINE: NEGATIVE MG/DL
HCT VFR BLD CALC: 43.4 % (ref 36–48)
HEMOGLOBIN: 14.9 G/DL (ref 12–16)
HPV COMMENT: ABNORMAL
HPV TYPE 16: NOT DETECTED
HPV TYPE 18: NOT DETECTED
HPVOH (OTHER TYPES): DETECTED
KETONES, URINE: NEGATIVE MG/DL
LACTIC ACID: 1.7 MMOL/L (ref 0.4–2)
LEUKOCYTE ESTERASE, URINE: NEGATIVE
LIPASE: 11 U/L (ref 13–60)
LYMPHOCYTES ABSOLUTE: 1.5 K/UL (ref 1–5.1)
LYMPHOCYTES RELATIVE PERCENT: 15.5 %
MCH RBC QN AUTO: 29.7 PG (ref 26–34)
MCHC RBC AUTO-ENTMCNC: 34.5 G/DL (ref 31–36)
MCV RBC AUTO: 86.1 FL (ref 80–100)
MICROSCOPIC EXAMINATION: NORMAL
MONOCYTES ABSOLUTE: 0.4 K/UL (ref 0–1.3)
MONOCYTES RELATIVE PERCENT: 3.9 %
NEUTROPHILS ABSOLUTE: 7.8 K/UL (ref 1.7–7.7)
NEUTROPHILS RELATIVE PERCENT: 79.4 %
NITRITE, URINE: NEGATIVE
PDW BLD-RTO: 14 % (ref 12.4–15.4)
PH UA: 7 (ref 5–8)
PLATELET # BLD: 285 K/UL (ref 135–450)
PMV BLD AUTO: 7.8 FL (ref 5–10.5)
POTASSIUM REFLEX MAGNESIUM: 4.1 MMOL/L (ref 3.5–5.1)
PROTEIN UA: NEGATIVE MG/DL
RBC # BLD: 5.03 M/UL (ref 4–5.2)
SODIUM BLD-SCNC: 139 MMOL/L (ref 136–145)
SPECIFIC GRAVITY UA: <=1.005 (ref 1–1.03)
TOTAL PROTEIN: 7.8 G/DL (ref 6.4–8.2)
URINE REFLEX TO CULTURE: NORMAL
URINE TYPE: NORMAL
UROBILINOGEN, URINE: 0.2 E.U./DL
WBC # BLD: 9.8 K/UL (ref 4–11)

## 2021-08-17 PROCEDURE — 80053 COMPREHEN METABOLIC PANEL: CPT

## 2021-08-17 PROCEDURE — 96374 THER/PROPH/DIAG INJ IV PUSH: CPT

## 2021-08-17 PROCEDURE — 85025 COMPLETE CBC W/AUTO DIFF WBC: CPT

## 2021-08-17 PROCEDURE — 2580000003 HC RX 258: Performed by: NURSE PRACTITIONER

## 2021-08-17 PROCEDURE — 87040 BLOOD CULTURE FOR BACTERIA: CPT

## 2021-08-17 PROCEDURE — 81003 URINALYSIS AUTO W/O SCOPE: CPT

## 2021-08-17 PROCEDURE — 96375 TX/PRO/DX INJ NEW DRUG ADDON: CPT

## 2021-08-17 PROCEDURE — 36415 COLL VENOUS BLD VENIPUNCTURE: CPT

## 2021-08-17 PROCEDURE — 83605 ASSAY OF LACTIC ACID: CPT

## 2021-08-17 PROCEDURE — 70450 CT HEAD/BRAIN W/O DYE: CPT

## 2021-08-17 PROCEDURE — 6360000002 HC RX W HCPCS: Performed by: NURSE PRACTITIONER

## 2021-08-17 PROCEDURE — 96376 TX/PRO/DX INJ SAME DRUG ADON: CPT

## 2021-08-17 PROCEDURE — 83690 ASSAY OF LIPASE: CPT

## 2021-08-17 PROCEDURE — 99283 EMERGENCY DEPT VISIT LOW MDM: CPT

## 2021-08-17 PROCEDURE — 6360000004 HC RX CONTRAST MEDICATION: Performed by: NURSE PRACTITIONER

## 2021-08-17 PROCEDURE — 74177 CT ABD & PELVIS W/CONTRAST: CPT

## 2021-08-17 RX ORDER — ONDANSETRON 2 MG/ML
4 INJECTION INTRAMUSCULAR; INTRAVENOUS ONCE
Status: COMPLETED | OUTPATIENT
Start: 2021-08-17 | End: 2021-08-17

## 2021-08-17 RX ORDER — FENTANYL CITRATE 50 UG/ML
50 INJECTION, SOLUTION INTRAMUSCULAR; INTRAVENOUS ONCE
Status: COMPLETED | OUTPATIENT
Start: 2021-08-17 | End: 2021-08-17

## 2021-08-17 RX ORDER — TRAMADOL HYDROCHLORIDE 50 MG/1
50 TABLET ORAL EVERY 6 HOURS PRN
Qty: 10 TABLET | Refills: 0 | Status: SHIPPED | OUTPATIENT
Start: 2021-08-17 | End: 2021-08-20

## 2021-08-17 RX ORDER — 0.9 % SODIUM CHLORIDE 0.9 %
1000 INTRAVENOUS SOLUTION INTRAVENOUS ONCE
Status: COMPLETED | OUTPATIENT
Start: 2021-08-17 | End: 2021-08-17

## 2021-08-17 RX ADMIN — FENTANYL CITRATE 50 MCG: 50 INJECTION, SOLUTION INTRAMUSCULAR; INTRAVENOUS at 15:44

## 2021-08-17 RX ADMIN — ONDANSETRON 4 MG: 2 INJECTION INTRAMUSCULAR; INTRAVENOUS at 15:44

## 2021-08-17 RX ADMIN — ONDANSETRON 4 MG: 2 INJECTION INTRAMUSCULAR; INTRAVENOUS at 17:53

## 2021-08-17 RX ADMIN — SODIUM CHLORIDE 1000 ML: 9 INJECTION, SOLUTION INTRAVENOUS at 15:44

## 2021-08-17 RX ADMIN — FENTANYL CITRATE 50 MCG: 50 INJECTION, SOLUTION INTRAMUSCULAR; INTRAVENOUS at 17:53

## 2021-08-17 RX ADMIN — IOPAMIDOL 100 ML: 755 INJECTION, SOLUTION INTRAVENOUS at 16:47

## 2021-08-17 ASSESSMENT — PAIN SCALES - GENERAL
PAINLEVEL_OUTOF10: 7
PAINLEVEL_OUTOF10: 7
PAINLEVEL_OUTOF10: 3

## 2021-08-17 ASSESSMENT — PAIN - FUNCTIONAL ASSESSMENT: PAIN_FUNCTIONAL_ASSESSMENT: 0-10

## 2021-08-17 NOTE — ED NOTES
Sitting on edge of bed. IV fluids infusing.    Encouraged to give urine specimen     Eliseo Ramirez RN  08/17/21 [id number]

## 2021-08-17 NOTE — ED PROVIDER NOTES
[facility] Emergency Department      CHIEF COMPLAINT  No chief complaint on file. SHARED SERVICE VISIT  I have seen and evaluated this patient with my supervising physician, Dr. Jesus Cliffordcrystal is a 47 y.o. nontoxic, well-appearing, female in mild distress with a medical history including, but not limited to,  shunt placement, scoliosis, diabetes mellitus, depression with anxiety, ESBL, ovarian cyst, headaches, migraines, kidney stones, PCOS, hydrocephalus, hyperlipidemia, meningitis, and nicotine dependence who presents to the ED complaining of a 1 month history of \"sharp\" 8/10 LUQ abdominal discomfort that is worse over the past few days. She called her PCP approximately 1.5 hours PTA and complained of increased pain and redness on her LUQ abdomen. She was told at that time to present to the emergency department for new or worsening symptoms and she therefore presents. She complains of \"sharp\" 8/10 LUQ abdominal pain accompanied by erythema of the left upper abdominal wall, fever-100.?, chills, sweats, nausea, vomiting x5 today, and presyncope. Denies chest pain, headache, dizziness, shortness of breath, cough, hemoptysis, change in smell or taste, leg/calf pain or swelling, diarrhea, or urinary symptoms. She is status post hysterectomy. No other complaints, modifying factors or associated symptoms. Nursing notes reviewed. Past Medical History:   Diagnosis Date    ADHD (attention deficit hyperactivity disorder) 82    Depression with anxiety     Diabetes mellitus ([facility] [address]     pre-diabetes    Difficult intubation     Encounter for imaging to screen for metal prior to MRI 06/01/2021    MRI Conditional Medtronic Non-Programmable shunt model#[id number] implanted 10/30/2020 at [facility]. Normal Mode.  1.5T or 3.0T.    ESBL (extended spectrum beta-lactamase) producing bacteria infection 11/06/2019    urine    Functional ovarian cysts 2008    rt ovary cyst x 2 yrs.     Headache(784.0)     migraines    History of blood transfusion     at birth   Ardyth Moritz History of kidney stones     History of PCOS     Hydrocephalus ([address]     Hyperlipidemia     Irritable bowel syndrome 2004    had colonoscopy about 6 yrs ago    Meningitis     Neutrophilic leukocytosis     Nicotine dependence     PONV (postoperative nausea and vomiting)     very nauseated and sometimes wakes up with a Migraine--happened once after brain surgery    Primary osteoarthritis of left knee 2016    S/P cone biopsy of cervix     Scoliosis .s     (ventriculoperitoneal) shunt status     hydrocephalus f[facility]w [facility]geon [facility]     (ventriculoperitoneal) shunt status     Wears glasses     reading     Past Surgical History:   Procedure Laterality Date    ABDOMEN SURGERY N/A 2021    REMOVAL OF ABDOMINAL WALL MASS performed by Joel Abbott MD at [address] [place] [address]      appendicitis     SECTION      placenta previa    CHOLECYSTECTOMY      COLONOSCOPY      COLPOSCOPY      CSF SHUNT      replaced at age 17   Ardyth Moritz CYSTOSCOPY      stone removal    HEMORRHOID SURGERY      HERNIA REPAIR Bilateral     inguinal    HERNIA REPAIR      hiatal hernia    HYSTERECTOMY, TOTAL ABDOMINAL  2016    TAHBSO, adhesions/PCOS    KNEE ARTHROSCOPY Left 2015    medial meniscectomy, chondroplasty, plica resection    LITHOTRIPSY Bilateral 12/10/2019    OTHER SURGICAL HISTORY  10/19/2016    op lap    VENTRICULOPERITONEAL SHUNT      multiple revisions, most recent      Family History   Problem Relation Age of Onset    High Blood Pressure Mother     Diabetes Mother     High Cholesterol Mother     Depression Mother     Diabetes Maternal Grandmother     High Blood Pressure Maternal Grandmother     High Cholesterol Maternal Grandmother     Heart Disease Maternal Grandfather     High Blood Pressure Maternal Grandfather     Heart Disease Paternal Grandmother     Stroke Paternal Grandfather     Depression Sister     Cirrhosis Father     Rheum Arthritis Neg Hx     Osteoarthritis Neg Hx     Asthma Neg Hx     Breast Cancer Neg Hx     Cancer Neg Hx     Heart Failure Neg Hx     Hypertension Neg Hx     Migraines Neg Hx     Ovarian Cancer Neg Hx     Rashes/Skin Problems Neg Hx     Seizures Neg Hx     Thyroid Disease Neg Hx      Social History     Socioeconomic History    Marital status: Single     Spouse name: Not on file    Number of children: Not on file    Years of education: Not on file    Highest education level: Not on file   Occupational History    Occupation: disability, SSI    Tobacco Use    Smoking status: Current Some Day Smoker     Packs/day: 0.50     Years: 18.00     Pack years: 9.00     Types: Cigarettes    Smokeless tobacco: Never Used   Vaping Use    Vaping Use: Never used   Substance and Sexual Activity    Alcohol use: No     Alcohol/week: 0.0 standard drinks    Drug use: Never    Sexual activity: Not Currently     Partners: Male   Other Topics Concern    Not on file   Social History Narrative    Not on file     Social Determinants of Health     Financial Resource Strain: Low Risk     Difficulty of Paying Living Expenses: Not hard at all   Food Insecurity: No Food Insecurity    Worried About Running Out of Food in the Last Year: Never true    [place] of Food in the Last Year: Never true   Transportation Needs:     Lack of Transportation (Medical):      Lack of Transportation (Non-Medical):    Physical Activity:     Days of Exercise per Week:     Minutes of Exercise per Session:    Stress:     Feeling of Stress :    Social Connections:     Frequency of Communication with Friends and Family:     Frequency of Social Gatherings with Friends and Family:     Attends Taoist Services:     Active Member of Clubs or Organizations:     Attends Club or Organization Meetings:     Marital Status:    Intimate Partner Violence:     Fear of Current or Ex-Partner:     Emotionally Abused:     Physically Abused:     Sexually Abused:      No current facility-administered medications for this encounter. Current Outpatient Medications   Medication Sig Dispense Refill    terbinafine (LAMISIL) 250 MG tablet Take 1 tablet by mouth daily 42 tablet 0    ALPRAZolam (XANAX) 0.5 MG tablet       lisdexamfetamine (VYVANSE) 50 MG capsule Take 30 mg by mouth every morning.  simvastatin (ZOCOR) 10 MG tablet Take 1 tablet by mouth nightly 90 tablet 1    SUMAtriptan (IMITREX) 100 MG tablet Take 1 tablet by mouth once as needed for Migraine 9 tablet 0     Allergies   Allergen Reactions    Bee Venom Shortness Of Breath and Swelling    Bentyl [Dicyclomine Hcl] Shortness Of Breath and Anxiety    Mushroom Extract Complex Anaphylaxis     Can't eat mushrooms.  Sulfa Antibiotics Shortness Of Breath    Vancomycin Anaphylaxis and Hives    Adhesive Tape Dermatitis    Toradol [Ketorolac Tromethamine] Hives and Itching     Injectable only  Tolerates PO NSAIDs fine    Acetaminophen Anxiety     Patient reports when taking acetaminophen (including Norco/Percocet) she gets severe anxiety when taking this medication.  Ceftaroline Rash    Daptomycin Swelling and Rash    Dicyclomine Anxiety    Fioricet-Codeine [Butalbital-Apap-Caff-Cod] Anxiety    Levaquin [Levofloxacin] Anxiety    Methocarbamol Rash    Prochlorperazine Anxiety    Tazobactam Nausea And Vomiting and Anxiety    Zosyn [Piperacillin Sod-Tazobactam So] Hives     Also causes nausea, SOB, dizziness       REVIEW OF SYSTEMS  10 systems reviewed, pertinent positives per HPI otherwise noted to be negative    PHYSICAL EXAM  LMP 10/31/2016 (Exact Date)   GENERAL APPEARANCE: Awake and alert. Cooperative.  + Mild distress. Non-- toxic in appearance. HEAD: Normocephalic. Atraumatic. EYES: PERRL. EOM's grossly intact.    ENT: Mucous membranes are moist.   NECK: Supple. Negative Kernig's and Brudzinski's signs. HEART:  RRR. No murmurs, rubs, or gallops.  + S1-S2.  LUNGS: Respirations unlabored. CTAB. Good air exchange. Speaking comfortably in full sentences. ABDOMEN: Soft. Non-distended.  + Diffuse tenderness.  + Guarding. No rebound.  + Bowel sounds x 4 quadrants. Negative Blue's, Rovsing, psoas, obturators, Remy's signs. No McBurney's point tenderness. No masses. No organomegaly. There is mild erythema and warmth noted to the LUQ abdomen. There is a vertical linear mercedes appearing to be a scratch on in the LUQ abdomen. EXTREMITIES: No peripheral edema. Moves all extremities equally. All extremities neurovascularly intact. SKIN: Warm and dry. No acute rashes. NEUROLOGICAL: Alert and oriented. CN's 2-12 intact. No gross facial drooping. Strength 5/5, sensation intact. PSYCHIATRIC: Normal mood and affect. GCS: 15    RADIOLOGY  CT ABDOMEN PELVIS WO CONTRAST Additional Contrast? None    Result Date: 7/29/2021  EXAMINATION: CT OF THE ABDOMEN AND PELVIS WITHOUT CONTRAST 7/28/2021 4:49 am TECHNIQUE: CT of the abdomen and pelvis was performed without the administration of intravenous contrast. Multiplanar reformatted images are provided for review. Dose modulation, iterative reconstruction, and/or weight based adjustment of the mA/kV was utilized to reduce the radiation dose to as low as reasonably achievable.  COMPARISON: 07/21/2021 HISTORY: ORDERING SYSTEM PROVIDED HISTORY: Right flank pain, suspect kidney stone TECHNOLOGIST PROVIDED HISTORY: Additional Contrast?->None Reason for exam:->Right flank pain, suspect kidney stone Decision Support Exception - unselect if not a suspected or confirmed emergency medical condition->Emergency Medical Condition (MA) Is the patient pregnant?->No Reason for Exam: RLQ pain x 6 days Acuity: Acute Type of Exam: Initial Additional signs and symptoms: Pt had sx 7/14/21 at [address] [facility] for removal of mass in lower midline of abdominal wall. 7/14/21 pt seen in ER for abd wall cellulitis. Relevant Medical/Surgical History: Hx kidney stones,  shunt. Sx litho, hernia rep, appy, cher, hysterectomy. FINDINGS: Lower Chest:  Visualized portion of the lower chest demonstrates no acute abnormality. Organs:  Lack of contrast limits evaluation of the solid organs and bowel. The visualized liver, spleen, pancreas and adrenal glands demonstrate no acute abnormality. Status post cholecystectomy. Kidney/urinary tract: There is a 3 mm nonobstructing stone in the lower pole of the right kidney. No evidence of stones in the left kidney, ureters or bladder. No hydronephrosis. No evidence of acute obstructive uropathy. GI/Bowel: Stomach and duodenal sweep demonstrate no acute abnormality. There is no evidence of bowel obstruction. No evidence of abnormal bowel wall thickening or distension. The appendix is visualized and is unremarkable. No evidence of acute appendicitis. Pelvis:  Bladder is unremarkable in appearance. Status post hysterectomy. Peritoneum/Retroperitoneum: No evidence of ascites or free air. No evidence of lymphadenopathy. Aorta is normal in caliber. There is a catheter terminating in the anterior peritoneum. Bones/Soft Tissues:  No acute abnormality of the visualized osseous structures. There is infiltration of the subcutaneous fat of the lower pelvic wall unchanged since prior examination that may represent postsurgical changes. No focal fluid collection. Mild infiltration of the subcutaneous fat of the lower abdominal/pelvic wall anteriorly as seen on prior examination. This may represent postsurgical changes although cellulitis is in the differential.  No fluid collection. 3 mm nonobstructing right renal stone. No hydronephrosis.      CT HEAD WO CONTRAST    Result Date: 8/4/2021  PROCEDURE: CT head without contrast INDICATION: headache, pressure,  shunt; COMPARISON: 6/7/2021 TECHNIQUE: CT head was performed without contrast according to standard protocol. Axial images and multiplanar reformatted images reviewed. Up-to-date CT equipment and radiation dose reduction techniques were employed. FINDINGS: No acute intra- or extra-axial fluid collections are identified. A left frontal approach  shunt catheter terminates in the region of the right foramen of Monro. There is a right frontal aileen hole. The ventricles are nondilated and slitlike. The basilar  cisterns are patent. No mass effect or midline shift is seen. There is a chronic right caudate head lacunar infarct. The gray-white matter differentiation is normal. No cerebral density abnormality is seen. The cerebellar tonsils are borderline low-lying at the level of the foramen magnum, unchanged. Visualized portions of the orbits, paranasal sinuses, and mastoids appear normal. No acute fracture is identified. 1.  No acute intracranial process. 2.  Unchanged appearance of left frontal approach  shunt catheter and nondilated ventricles. CT ABDOMEN PELVIS W IV CONTRAST Additional Contrast? None    Result Date: 7/21/2021  EXAMINATION: CT OF THE ABDOMEN AND PELVIS WITH CONTRAST 7/21/2021 7:26 pm TECHNIQUE: CT of the abdomen and pelvis was performed with the administration of intravenous contrast. Multiplanar reformatted images are provided for review. Dose modulation, iterative reconstruction, and/or weight based adjustment of the mA/kV was utilized to reduce the radiation dose to as low as reasonably achievable.  COMPARISON: 07/02/2021 HISTORY: ORDERING SYSTEM PROVIDED HISTORY: pain around RLQ surgical incision site, fevers TECHNOLOGIST PROVIDED HISTORY: Reason for exam:->pain around RLQ surgical incision site, fevers Additional Contrast?->None Decision Support Exception - unselect if not a suspected or confirmed emergency medical condition->Emergency Medical Condition (MA) Reason for Exam: Pain around RLQ surgical incision pulmonary vascularity are within normal limits. There are no focal areas of consolidation or pleural effusion. The osseous structures are intact. A dextroscoliosis of the thoracic spine is unchanged. Shunt tubing overlying the left neck and chest appears stable. No acute abnormality     XR SHUNT SERIES PLACEMENT (<4 VIEWS)    Result Date: 8/4/2021  Shunt series HISTORY: Shunt dysfunction     6 views demonstrate left-sided ventriculoperitoneal shunt tubing terminating in the left lower abdomen. There is no visualized kink or break of the catheter. Moderate thoracic dextroscoliosis. Mild lumbar levoscoliosis. ED COURSE  Patient received fentanyl for pain, with good relief. Triage vitals stable but hypertensive at 126/91 mmHg and with a tachycardic rate at 118 bpm.  An abdominal, septic, shunt workup was initiated including blood culture #1, urinalysis reflex to culture, CMP, CBC, lipase, lactic acid, CT A/P, and CT head. Labs Ordered:  I have reviewed and interpreted all of the currently available lab results from this visit:  Results for orders placed or performed during the hospital encounter of 08/17/21   Culture, Blood 1    Specimen: Blood   Result Value Ref Range    Blood Culture, Routine       No Growth to date. Any change in status will be called.    CBC Auto Differential   Result Value Ref Range    WBC 9.8 4.0 - 11.0 K/uL    RBC 5.03 4.00 - 5.20 M/uL    Hemoglobin 14.9 12.0 - 16.0 g/dL    Hematocrit 43.4 36.0 - 48.0 %    MCV 86.1 80.0 - 100.0 fL    MCH 29.7 26.0 - 34.0 pg    MCHC 34.5 31.0 - 36.0 g/dL    RDW 14.0 12.4 - 15.4 %    Platelets 498 058 - 955 K/uL    MPV 7.8 5.0 - 10.5 fL    Neutrophils % 79.4 %    Lymphocytes % 15.5 %    Monocytes % 3.9 %    Eosinophils % 0.5 %    Basophils % 0.7 %    Neutrophils Absolute 7.8 (H) 1.7 - 7.7 K/uL    Lymphocytes Absolute 1.5 1.0 - 5.1 K/uL    Monocytes Absolute 0.4 0.0 - 1.3 K/uL    Eosinophils Absolute 0.0 0.0 - 0.6 K/uL    Basophils Absolute 0.1 0.0 - 0.2 K/uL   Comprehensive Metabolic Panel w/ Reflex to MG   Result Value Ref Range    Sodium 139 136 - 145 mmol/L    Potassium reflex Magnesium 4.1 3.5 - 5.1 mmol/L    Chloride 102 99 - 110 mmol/L    CO2 26 21 - 32 mmol/L    Anion Gap 11 3 - 16    Glucose 142 (H) 70 - 99 mg/dL    BUN 9 7 - 20 mg/dL    CREATININE 0.7 0.6 - 1.1 mg/dL    GFR Non-African American >60 >60    GFR African American >60 >60    Calcium 9.9 8.3 - 10.6 mg/dL    Total Protein 7.8 6.4 - 8.2 g/dL    Albumin 4.3 3.4 - 5.0 g/dL    Albumin/Globulin Ratio 1.2 1.1 - 2.2    Total Bilirubin 0.3 0.0 - 1.0 mg/dL    Alkaline Phosphatase 146 (H) 40 - 129 U/L    ALT 40 10 - 40 U/L    AST 25 15 - 37 U/L    Globulin 3.5 g/dL   Lipase   Result Value Ref Range    Lipase 11.0 (L) 13.0 - 60.0 U/L   Lactic Acid, Plasma   Result Value Ref Range    Lactic Acid 1.7 0.4 - 2.0 mmol/L   Urinalysis Reflex to Culture    Specimen: Urine, clean catch   Result Value Ref Range    Color, UA Yellow Straw/Yellow    Clarity, UA Clear Clear    Glucose, Ur Negative Negative mg/dL    Bilirubin Urine Negative Negative    Ketones, Urine Negative Negative mg/dL    Specific Gravity, UA <=1.005 1.005 - 1.030    Blood, Urine Negative Negative    pH, UA 7.0 5.0 - 8.0    Protein, UA Negative Negative mg/dL    Urobilinogen, Urine 0.2 <2.0 E.U./dL    Nitrite, Urine Negative Negative    Leukocyte Esterase, Urine Negative Negative    Microscopic Examination Not Indicated     Urine Type Cleancatch     Urine Reflex to Culture Not Indicated            Imaging ordered:  CT HEAD WO CONTRAST    Result Date: 8/17/2021  Stable exam.   shunt catheter appears unchanged. No hydrocephalus noted. CT ABDOMEN PELVIS W IV CONTRAST Additional Contrast? None    Result Date: 8/17/2021  1. No diverticulitis or small bowel obstruction. 2. No fluid or fluid collection adjacent to the ventriculoperitoneal shunt catheter tip. 3. Other findings as described.            Reevaluation:  Upon reentry into patient's room upon reevaluation I find a 82-year-old female in no apparent distress resting company on stretcher with eyes open with stable vital signs. Patient endorses feeling better. A discussion was had with Mrs. Keanu Sena regarding left upper quadrant abdominal pain,  shunt status, and intention to discharge. Risk management discussed and shared decision making had with patient and/or surrogate. All questions were answered. Patient will follow up with her PCP for further evaluation/treatment. All questions answered. Patient will return to ED for new/worsening symptoms. Patient is agreeable with this plan. Patient was sent home with a prescription for tramadol. CRITICAL CARE TIME  0 Minutes of critical care time spent not including separately billable procedures. MDM  Patient presents to the emergency department with LUQ abdominal pain. Alternate diagnoses are less likely based on history and physical. Considered kidney stone, pyelonephritis, UTI, appendicitis, bowel obstruction, diverticulitis, hernia, gastritis/gastroenteritis, pancreatitis, cholecystitis, hepatitis, constipation, IBS, IBD, sepsis, cellulitis, peritonitis, hydrocephalus, malfunctioning  shunt, among others less likely based on history and physical.          Work-up reveals:  (Abnormal labs and imaging noted otherwise  normal)      CT head: Stable exam.   shunt catheter appears unchanged. No hydrocephalus noted. CT A/P: No diverticulitis or small bowel obstruction. No fluid or fluid collection adjacent to the ventriculoperitoneal shunt catheter tip. Other findings as described.     CMP: Hyperglycemic at 142 mg/dL, elevated alkaline phosphatase at 146, otherwise unremarkable    Lipase: Decreased at 11.0, otherwise unremarkable    CBC: Negative for leukocytosis or anemia with neutrophils [email or web address], otherwise unremarkable              Interventions: Patient was initially tachycardic at 116 bpm and was therefore given a liter bolus normal saline, Zofran for nausea, and fentanyl for pain. After fluids her heart rate did normalize into the 80s. Work-up reveals no failure of  shunt malfunction, peritonitis, hydrocephalus, sepsis, or acute intra-abdominal findings. Therefore:    My attending physician and I feel that the patient is safe and appropriate for discharge to home with close outpatient follow-up with her PCP in the next 1-2 days. Patient will be treated with tramadol and is instructed to return to the emergency department immediately for new or worsening symptoms including, but not limited to, worsening pain, inability to tolerate food or drink, developing fever, chills, sweats, seeing blood in your vomit or stool, headache, passing out, feeling as if you get a pass out, other concerns. Patient verbalizes understanding and is agreeable with plan for discharge and follow-up. Clinical Impression:  Left upper quadrant abdominal pain   shunt status      DISPOSITION  Discharged        Karmen Moreno CNP   Acute Care [facility]    This chart was created using Dragon dictation. Every effort was made by myself to ensure accuracy, however due to limitations of this technology errors may be present.       FREDRICK Salamanca - CNP  08/18/21 [id number]

## 2021-08-17 NOTE — ED NOTES
abd pain at 7-8. Some nausea. Asking for pain and nausea medicine. NP updated.      Jose Bates, RN  08/17/21 15 hospitals, RN  08/17/21 [id number]

## 2021-08-17 NOTE — ED PROVIDER NOTES
I independently evaluated and obtained a history and physical on Petrona Green. All diagnostic, treatment, and disposition assistants were made to myself in conjunction the advanced practice provider. For further details of this patient's emergency department encounter, please see the advanced practice provider's documentation. History: Patient is a 82-year-old with  shunt ever since she was young has a revision recently by [place]. Patient was having mild headache but also is concerned she is having some low abdominal pain. It is red patient may have scratched it. Patient was sent by her doctor for further evaluation. There has been some nausea but no active vomiting. There has been no fever. Patient had some chills. Patient status post hysterectomy. Has been no evidence of sepsis. Patient has no chills at this time. Patient seen in conjunction with ALDAIR. Physician Exam: His exam is unremarkable she does have some tachycardia blood pressure is normal.  Afebrile. Patient is alert awake in no acute distress. Lungs were clear to auscultation and no tenderness to palpation of the head. Lungs were clear to auscultation heart regular rate and rhythm abdomen is soft mild tenderness to the mid epigastric area. A small scratch scar noted. Patient may have scratched in her sleep. No rebound tenderness no mass.     Labs were as follows    Labs Reviewed   CBC WITH AUTO DIFFERENTIAL - Abnormal; Notable for the following components:       Result Value    Neutrophils Absolute 7.8 (*)     All other components within normal limits    Narrative:     Performed at:  [facility]) [facility]  [address] [facility], [facility]   Phone [phone]   COMPREHENSIVE METABOLIC PANEL W/ REFLEX TO MG FOR LOW K - Abnormal; Notable for the following components:    Glucose 142 (*)     Alkaline Phosphatase 146 (*)     All other components within normal limits    Narrative: Performed at:  [facility]  [address] [place], [place]   Phone [phone]   LIPASE - Abnormal; Notable for the following components:    Lipase 11.0 (*)     All other components within normal limits    Narrative:     Performed at:  [facility]  [address] [place], [place]   Phone [phone]   CULTURE, BLOOD 1   CULTURE, BLOOD 2   LACTIC ACID, PLASMA    Narrative:     Performed at:  [facility]) [facility]  [address] [place], [place]   Phone [phone]   URINE RT REFLEX TO CULTURE    Narrative:     Performed at:  [facility]  [address] [place], [place]   Phone [phone]     CT HEAD WO CONTRAST   Final Result   Stable exam.   shunt catheter appears unchanged. No hydrocephalus noted. CT ABDOMEN PELVIS W IV CONTRAST Additional Contrast? None   Final Result   1. No diverticulitis or small bowel obstruction. 2. No fluid or fluid collection adjacent to the ventriculoperitoneal shunt   catheter tip. 3. Other findings as described. Patient is a 42-year-old has a  shunt since birth has a revision now had some lower abdominal pain. Was sent in for evaluation CAT scan reveals nothing acute with for  shunt looks like it still functional.  Patient appointment for neurosurgeon later on this week. Patient was given some Ultram for pain no evidence of infection. Patient has a little scratch on her abdomen. No antibiotics indicated at this time. Patient will follow up.   Patient discharged in improved condition         Lizeth Lam MD  08/17/21 1420

## 2021-08-17 NOTE — ED TRIAGE NOTES
C/o abrasion on abd above shunt. Concerned it may be infected. + nausea and decreased appetite.  Also states she has a chronic HA but is \"used to it, its always there\"

## 2021-08-19 ENCOUNTER — TELEPHONE (OUTPATIENT)
Dept: FAMILY MEDICINE CLINIC | Age: 41
End: 2021-08-19

## 2021-08-19 DIAGNOSIS — F41.9 ANXIETY: Primary | ICD-10-CM

## 2021-08-19 NOTE — TELEPHONE ENCOUNTER
Pt called to ask Dr Lowell Diaz if there is something he can give her to help with her anxiety the day of her biopsy on Wednesday.  Please give pt a call [phone]

## 2021-08-19 NOTE — ED PROVIDER NOTES
I independently evaluated and obtained a history and physical on Petrona Green. All diagnostic, treatment, and disposition assistants were made to myself in conjunction the advanced practice provider. For further details of this patient's emergency department encounter, please see the advanced practice provider's documentation. History: Patient is a 68-year-old female with history of  shunt presents to the ED with left abdominal pain. Patient was seen with ALDAIR. There is no nausea no vomiting. No fever. And appears to be in no distress. Patient has a small scratch on her abdomen. Slightly tender on palpation. Physician Exam: Patient is a 68-year-old with normal vital signs afebrile. Pulse is 84 no evidence of sepsis. Abdominal exam was benign except for mild tenderness and a mild red spot from her scratching. The head shows a  shunt noted that follows to the neck and to the peritoneum. Moving all extremities. Labs are as follows        Labs Reviewed   CBC WITH AUTO DIFFERENTIAL - Abnormal; Notable for the following components:       Result Value    WBC 12.6 (*)     Neutrophils Absolute 8.3 (*)     All other components within normal limits    Narrative:     Performed at:  [facility]  [address] [place], [place] [address]   Phone [phone]   BRAIN NATRIURETIC PEPTIDE    Narrative:     Performed at:  [facility]  [address] [place], [place] [address]   Phone [phone]     No orders to display       No orders to display       Disposition discussed with ALDAIR. No evidence of any obstruction on CAT scan. Suspect may be just abdominal pain patient will follow up with her surgeon on Friday.      Mi Contreras MD  08/19/21 0030

## 2021-08-20 RX ORDER — ALPRAZOLAM 0.5 MG/1
0.5 TABLET ORAL
Qty: 1 TABLET | Refills: 0 | Status: SHIPPED | OUTPATIENT
Start: 2021-08-20 | End: 2021-08-20

## 2021-08-20 NOTE — TELEPHONE ENCOUNTER
Rx for xanax x 1 provided at [facility] office. Pt may  rx.

## 2021-08-21 LAB — BLOOD CULTURE, ROUTINE: NORMAL

## 2021-08-28 ENCOUNTER — HOSPITAL ENCOUNTER (EMERGENCY)
Age: 41
Discharge: HOME | End: 2021-08-28
Attending: EMERGENCY MEDICINE
Payer: COMMERCIAL

## 2021-08-28 VITALS
BODY MASS INDEX: 31.96 KG/M2 | WEIGHT: 158.51 LBS | HEIGHT: 59 IN | HEART RATE: 88 BPM | OXYGEN SATURATION: 98 % | TEMPERATURE: 98.2 F | DIASTOLIC BLOOD PRESSURE: 93 MMHG | SYSTOLIC BLOOD PRESSURE: 133 MMHG | RESPIRATION RATE: 16 BRPM

## 2021-08-28 DIAGNOSIS — R10.9 RIGHT FLANK PAIN: Primary | ICD-10-CM

## 2021-08-28 LAB
A/G RATIO: 1.3 (ref 1.1–2.2)
ALBUMIN SERPL-MCNC: 4.4 G/DL (ref 3.4–5)
ALP BLD-CCNC: 105 U/L (ref 40–129)
ALT SERPL-CCNC: 12 U/L (ref 10–40)
ANION GAP SERPL CALCULATED.3IONS-SCNC: 13 MMOL/L (ref 3–16)
AST SERPL-CCNC: 12 U/L (ref 15–37)
BASOPHILS ABSOLUTE: 0.1 K/UL (ref 0–0.2)
BASOPHILS RELATIVE PERCENT: 0.8 %
BILIRUB SERPL-MCNC: 0.3 MG/DL (ref 0–1)
BILIRUBIN URINE: NEGATIVE
BLOOD, URINE: NEGATIVE
BUN BLDV-MCNC: 12 MG/DL (ref 7–20)
CALCIUM SERPL-MCNC: 9.9 MG/DL (ref 8.3–10.6)
CHLORIDE BLD-SCNC: 105 MMOL/L (ref 99–110)
CLARITY: ABNORMAL
CO2: 23 MMOL/L (ref 21–32)
COLOR: YELLOW
CREAT SERPL-MCNC: 0.7 MG/DL (ref 0.6–1.1)
EOSINOPHILS ABSOLUTE: 0.1 K/UL (ref 0–0.6)
EOSINOPHILS RELATIVE PERCENT: 1 %
GFR AFRICAN AMERICAN: >60
GFR NON-AFRICAN AMERICAN: >60
GLOBULIN: 3.3 G/DL
GLUCOSE BLD-MCNC: 133 MG/DL (ref 70–99)
GLUCOSE URINE: NEGATIVE MG/DL
HCT VFR BLD CALC: 45.2 % (ref 36–48)
HEMOGLOBIN: 15.5 G/DL (ref 12–16)
KETONES, URINE: NEGATIVE MG/DL
LEUKOCYTE ESTERASE, URINE: NEGATIVE
LIPASE: 20 U/L (ref 13–60)
LYMPHOCYTES ABSOLUTE: 2.2 K/UL (ref 1–5.1)
LYMPHOCYTES RELATIVE PERCENT: 22.9 %
MCH RBC QN AUTO: 29.7 PG (ref 26–34)
MCHC RBC AUTO-ENTMCNC: 34.3 G/DL (ref 31–36)
MCV RBC AUTO: 86.7 FL (ref 80–100)
MICROSCOPIC EXAMINATION: ABNORMAL
MONOCYTES ABSOLUTE: 0.4 K/UL (ref 0–1.3)
MONOCYTES RELATIVE PERCENT: 4.2 %
NEUTROPHILS ABSOLUTE: 6.9 K/UL (ref 1.7–7.7)
NEUTROPHILS RELATIVE PERCENT: 71.1 %
NITRITE, URINE: NEGATIVE
PDW BLD-RTO: 13.8 % (ref 12.4–15.4)
PH UA: 6.5 (ref 5–8)
PLATELET # BLD: 308 K/UL (ref 135–450)
PMV BLD AUTO: 7.8 FL (ref 5–10.5)
POTASSIUM REFLEX MAGNESIUM: 3.6 MMOL/L (ref 3.5–5.1)
PROTEIN UA: NEGATIVE MG/DL
RBC # BLD: 5.21 M/UL (ref 4–5.2)
SODIUM BLD-SCNC: 141 MMOL/L (ref 136–145)
SPECIFIC GRAVITY UA: <=1.005 (ref 1–1.03)
TOTAL PROTEIN: 7.7 G/DL (ref 6.4–8.2)
URINE REFLEX TO CULTURE: ABNORMAL
URINE TYPE: ABNORMAL
UROBILINOGEN, URINE: 0.2 E.U./DL
WBC # BLD: 9.8 K/UL (ref 4–11)

## 2021-08-28 PROCEDURE — 99284 EMERGENCY DEPT VISIT MOD MDM: CPT

## 2021-08-28 PROCEDURE — 85025 COMPLETE CBC W/AUTO DIFF WBC: CPT

## 2021-08-28 PROCEDURE — 80053 COMPREHEN METABOLIC PANEL: CPT

## 2021-08-28 PROCEDURE — 6370000000 HC RX 637 (ALT 250 FOR IP): Performed by: EMERGENCY MEDICINE

## 2021-08-28 PROCEDURE — 81003 URINALYSIS AUTO W/O SCOPE: CPT

## 2021-08-28 PROCEDURE — 83690 ASSAY OF LIPASE: CPT

## 2021-08-28 PROCEDURE — 36415 COLL VENOUS BLD VENIPUNCTURE: CPT

## 2021-08-28 RX ORDER — PROMETHAZINE HYDROCHLORIDE 25 MG/1
25 TABLET ORAL 4 TIMES DAILY PRN
Qty: 15 TABLET | Refills: 0 | Status: SHIPPED | OUTPATIENT
Start: 2021-08-28 | End: 2021-09-01

## 2021-08-28 RX ORDER — HYDROCODONE BITARTRATE AND ACETAMINOPHEN 5; 325 MG/1; MG/1
1 TABLET ORAL EVERY 4 HOURS PRN
Qty: 10 TABLET | Refills: 0 | Status: SHIPPED | OUTPATIENT
Start: 2021-08-28 | End: 2021-08-31

## 2021-08-28 RX ORDER — HYDROCODONE BITARTRATE AND ACETAMINOPHEN 5; 325 MG/1; MG/1
1 TABLET ORAL ONCE
Status: COMPLETED | OUTPATIENT
Start: 2021-08-28 | End: 2021-08-28

## 2021-08-28 RX ADMIN — HYDROCODONE BITARTRATE AND ACETAMINOPHEN 1 TABLET: 5; 325 TABLET ORAL at 14:41

## 2021-08-28 ASSESSMENT — PAIN DESCRIPTION - PAIN TYPE: TYPE: ACUTE PAIN

## 2021-08-28 ASSESSMENT — PAIN SCALES - GENERAL
PAINLEVEL_OUTOF10: 9
PAINLEVEL_OUTOF10: 9
PAINLEVEL_OUTOF10: 7

## 2021-08-28 ASSESSMENT — PAIN DESCRIPTION - DESCRIPTORS: DESCRIPTORS: SHARP

## 2021-08-28 ASSESSMENT — PAIN DESCRIPTION - ORIENTATION: ORIENTATION: RIGHT

## 2021-08-28 ASSESSMENT — PAIN DESCRIPTION - LOCATION: LOCATION: FLANK

## 2021-08-28 NOTE — ED TRIAGE NOTES
Patient presents to ED complaining of right flank pain gradually worsening x2 weeks. Reports hx of kidney stones but is usually able to pass them without complication. Reports nausea and vomitting, reports some chills but did not measure temperature. Patient also reports patient radiating to right abdomen and some left abdomen tenderness after emesis. Patient resting on bed, respirations even and easy at this time. Patient rocking on bed and frequently wincing. Appears uncomfortable.

## 2021-08-28 NOTE — ED NOTES
Patient ambulatory from ED. AVS provided and discussed with patient. All questions answered. Patient verbalizes understanding of discharge instructions. Respirations even and easy. No obvious distress at this time. Patient notified that they should not drive after receiving or while taking norco due to potential for drowsiness and its status as a controlled substance. Patient verbalizes understanding.        Felix Walker RN  08/28/21 1615

## 2021-08-28 NOTE — ED PROVIDER NOTES
CHIEF COMPLAINT  Flank Pain (right flank pain, gradually worsening x2 weeks. Reports hx of kidney stones but is usually able to pass them without complication. Reports nausea and vomitting, reports some chills but did not measure temperature. Patient also reports patient radiating to right abdomen and some left abdomen tenderness. after emesis.)      HISTORY OF PRESENT ILLNESS  Tuyet Toussaint is a 47 y.o. female presents to the ED with right flank pain. The patient has had right flank pain intermittently for last couple weeks worse last 3 days. She had a kidney stone a month ago that was not seen again on a kidney CT 11 days ago. She has had darker urine, does have urology, and has passed multiple kidney stones in the past.  She denies having fevers, cough, chest discomfort. She comes asking for something for pain and nausea. No other complaints, modifying factors or associated symptoms. I have reviewed the following from the nursing documentation. Past Medical History:   Diagnosis Date    ADHD (attention deficit hyperactivity disorder) 82    Depression with anxiety     Diabetes mellitus ([facility] [address]     pre-diabetes    Difficult intubation     Encounter for imaging to screen for metal prior to MRI 06/01/2021    MRI Conditional Medtronic Non-Programmable shunt model#[id number] implanted 10/30/2020 at [facility]. Normal Mode. 1.5T or 3.0T.    ESBL (extended spectrum beta-lactamase) producing bacteria infection 11/06/2019    urine    Functional ovarian cysts 2008    rt ovary cyst x 2 yrs.     Headache(784.0)     migraines    History of blood transfusion     at birth   Juan Daniel Moses History of kidney stones     History of PCOS     Hydrocephalus ([facility] [address]     Hyperlipidemia     Irritable bowel syndrome 2004    had colonoscopy about 6 yrs ago    Meningitis 84/1291    Neutrophilic leukocytosis     Nicotine dependence     PONV (postoperative nausea and vomiting)     very nauseated and sometimes wakes up with a Migraine--happened once after brain surgery    Primary osteoarthritis of left knee 2016    S/P cone biopsy of cervix 2004    Scoliosis .s     (ventriculoperitoneal) shunt status     hydrocephalus [facility]     (ventriculoperitoneal) shunt status     Wears glasses     reading     Past Surgical History:   Procedure Laterality Date    ABDOMEN SURGERY N/A 2021    REMOVAL OF ABDOMINAL WALL MASS performed by Cash Melton MD at [facility]      appendicitis     SECTION  2005    placenta previa    CHOLECYSTECTOMY      COLONOSCOPY  2004    COLPOSCOPY      CSF SHUNT      replaced at age 17   Yvrose Stabs CYSTOSCOPY      stone removal    HEMORRHOID SURGERY      HERNIA REPAIR Bilateral     inguinal    HERNIA REPAIR      hiatal hernia    HYSTERECTOMY, TOTAL ABDOMINAL  2016    TAHBSO, adhesions/PCOS    KNEE ARTHROSCOPY Left 2015    medial meniscectomy, chondroplasty, plica resection    LITHOTRIPSY Bilateral 12/10/2019    OTHER SURGICAL HISTORY  10/19/2016    op lap    VENTRICULOPERITONEAL SHUNT      multiple revisions, most recent      Family History   Problem Relation Age of Onset    High Blood Pressure Mother     Diabetes Mother     High Cholesterol Mother     Depression Mother     Diabetes Maternal Grandmother     High Blood Pressure Maternal Grandmother     High Cholesterol Maternal Grandmother     Heart Disease Maternal Grandfather     High Blood Pressure Maternal Grandfather     Heart Disease Paternal Grandmother     Stroke Paternal Grandfather     Depression Sister     Cirrhosis Father     Rheum Arthritis Neg Hx     Osteoarthritis Neg Hx     Asthma Neg Hx     Breast Cancer Neg Hx     Cancer Neg Hx     Heart Failure Neg Hx     Hypertension Neg Hx     Migraines Neg Hx     Ovarian Cancer Neg Hx     Rashes/Skin Problems Neg Hx     Seizures Neg Hx     Thyroid Disease Neg Hx      Social History Socioeconomic History    Marital status: Single     Spouse name: Not on file    Number of children: Not on file    Years of education: Not on file    Highest education level: Not on file   Occupational History    Occupation: disability, SSI    Tobacco Use    Smoking status: Current Some Day Smoker     Packs/day: 0.50     Years: 18.00     Pack years: 9.00     Types: Cigarettes    Smokeless tobacco: Never Used   Vaping Use    Vaping Use: Never used   Substance and Sexual Activity    Alcohol use: No     Alcohol/week: 0.0 standard drinks    Drug use: Never    Sexual activity: Not Currently     Partners: Male   Other Topics Concern    Not on file   Social History Narrative    Not on file     Social Determinants of Health     Financial Resource Strain: Low Risk     Difficulty of Paying Living Expenses: Not hard at all   Food Insecurity: No Food Insecurity    Worried About Running Out of Food in the Last Year: Never true    [place] of Food in the Last Year: Never true   Transportation Needs:     Lack of Transportation (Medical):      Lack of Transportation (Non-Medical):    Physical Activity:     Days of Exercise per Week:     Minutes of Exercise per Session:    Stress:     Feeling of Stress :    Social Connections:     Frequency of Communication with Friends and Family:     Frequency of Social Gatherings with Friends and Family:     Attends Congregational Services:     Active Member of Clubs or Organizations:     Attends Club or Organization Meetings:     Marital Status:    Intimate Partner Violence:     Fear of Current or Ex-Partner:     Emotionally Abused:     Physically Abused:     Sexually Abused:      Current Facility-Administered Medications   Medication Dose Route Frequency Provider Last Rate Last Admin    HYDROcodone-acetaminophen (NORCO) 5-325 MG per tablet 1 tablet  1 tablet Oral Once Wan Crockett MD         Current Outpatient Medications   Medication Sig Dispense Refill  HYDROcodone-acetaminophen (NORCO) 5-325 MG per tablet Take 1 tablet by mouth every 4 hours as needed for Pain for up to 3 days. Intended supply: 3 days. Take lowest dose possible to manage pain 10 tablet 0    promethazine (PHENERGAN) 25 MG tablet Take 1 tablet by mouth 4 times daily as needed for Nausea 15 tablet 0    terbinafine (LAMISIL) 250 MG tablet Take 1 tablet by mouth daily 42 tablet 0    ALPRAZolam (XANAX) 0.5 MG tablet       lisdexamfetamine (VYVANSE) 50 MG capsule Take 30 mg by mouth every morning.  simvastatin (ZOCOR) 10 MG tablet Take 1 tablet by mouth nightly 90 tablet 1    SUMAtriptan (IMITREX) 100 MG tablet Take 1 tablet by mouth once as needed for Migraine 9 tablet 0     Allergies   Allergen Reactions    Bee Venom Shortness Of Breath and Swelling    Bentyl [Dicyclomine Hcl] Shortness Of Breath and Anxiety    Mushroom Extract Complex Anaphylaxis     Can't eat mushrooms.  Sulfa Antibiotics Shortness Of Breath    Vancomycin Anaphylaxis and Hives    Adhesive Tape Dermatitis    Toradol [Ketorolac Tromethamine] Hives and Itching     Injectable only  Tolerates PO NSAIDs fine    Acetaminophen Anxiety     Patient reports when taking acetaminophen (including Norco/Percocet) she gets severe anxiety when taking this medication.  Ceftaroline Rash    Daptomycin Swelling and Rash    Dicyclomine Anxiety    Fioricet-Codeine [Butalbital-Apap-Caff-Cod] Anxiety    Levaquin [Levofloxacin] Anxiety    Methocarbamol Rash    Prochlorperazine Anxiety    Tazobactam Nausea And Vomiting and Anxiety    Zosyn [Piperacillin Sod-Tazobactam So] Hives     Also causes nausea, SOB, dizziness       REVIEW OF SYSTEMS  10 systems reviewed, pertinent positives per HPI otherwise noted to be negative.     PHYSICAL EXAM  /74   Pulse 106   Temp 98.2 °F (36.8 °C) (Temporal)   Resp 14   Ht 4' 11\" (1.499 m)   Wt 158 lb 8.2 oz (71.9 kg)   LMP 10/31/2016 (Exact Date)   SpO2 97%   BMI 32.02 kg/m² GENERAL APPEARANCE: Awake and alert. Cooperative. Minimal distress  HEAD: Normocephalic. Atraumatic. EYES: PERRL. EOM's grossly intact. ENT: Mucous membranes are moist.   NECK: Supple. HEART: RRR. No murmurs. Heart rate of 90 on my exam.  LUNGS: Respirations unlabored. Lungs are clear bilaterally. ABDOMEN: Soft. Non-distended. Tender in the right costovertebral angle, nontender abdomen, healed scars on the abdominal wall, no signs of infection. No guarding or rebound. Normal bowel sounds. EXTREMITIES: No peripheral edema. Moves all extremities equally. All extremities neurovascularly intact. SKIN: Warm and dry. No acute rashes. NEUROLOGICAL: Alert and oriented. PSYCHIATRIC: Normal mood and affect. LABS  I have reviewed all labs for this visit.    Results for orders placed or performed during the hospital encounter of 08/28/21   CBC Auto Differential   Result Value Ref Range    WBC 9.8 4.0 - 11.0 K/uL    RBC 5.21 (H) 4.00 - 5.20 M/uL    Hemoglobin 15.5 12.0 - 16.0 g/dL    Hematocrit 45.2 36.0 - 48.0 %    MCV 86.7 80.0 - 100.0 fL    MCH 29.7 26.0 - 34.0 pg    MCHC 34.3 31.0 - 36.0 g/dL    RDW 13.8 12.4 - 15.4 %    Platelets 774 057 - 929 K/uL    MPV 7.8 5.0 - 10.5 fL    Neutrophils % 71.1 %    Lymphocytes % 22.9 %    Monocytes % 4.2 %    Eosinophils % 1.0 %    Basophils % 0.8 %    Neutrophils Absolute 6.9 1.7 - 7.7 K/uL    Lymphocytes Absolute 2.2 1.0 - 5.1 K/uL    Monocytes Absolute 0.4 0.0 - 1.3 K/uL    Eosinophils Absolute 0.1 0.0 - 0.6 K/uL    Basophils Absolute 0.1 0.0 - 0.2 K/uL   Comprehensive Metabolic Panel w/ Reflex to MG   Result Value Ref Range    Sodium 141 136 - 145 mmol/L    Potassium reflex Magnesium 3.6 3.5 - 5.1 mmol/L    Chloride 105 99 - 110 mmol/L    CO2 23 21 - 32 mmol/L    Anion Gap 13 3 - 16    Glucose 133 (H) 70 - 99 mg/dL    BUN 12 7 - 20 mg/dL    CREATININE 0.7 0.6 - 1.1 mg/dL    GFR Non-African American >60 >60    GFR African American >60 >60    Calcium 9.9 8.3 - 10.6 mg/dL    Total Protein 7.7 6.4 - 8.2 g/dL    Albumin 4.4 3.4 - 5.0 g/dL    Albumin/Globulin Ratio 1.3 1.1 - 2.2    Total Bilirubin 0.3 0.0 - 1.0 mg/dL    Alkaline Phosphatase 105 40 - 129 U/L    ALT 12 10 - 40 U/L    AST 12 (L) 15 - 37 U/L    Globulin 3.3 g/dL   Lipase   Result Value Ref Range    Lipase 20.0 13.0 - 60.0 U/L   Urinalysis Reflex to Culture    Specimen: Urine, clean catch   Result Value Ref Range    Color, UA Yellow Straw/Yellow    Clarity, UA SL CLOUDY (A) Clear    Glucose, Ur Negative Negative mg/dL    Bilirubin Urine Negative Negative    Ketones, Urine Negative Negative mg/dL    Specific Gravity, UA <=1.005 1.005 - 1.030    Blood, Urine Negative Negative    pH, UA 6.5 5.0 - 8.0    Protein, UA Negative Negative mg/dL    Urobilinogen, Urine 0.2 <2.0 E.U./dL    Nitrite, Urine Negative Negative    Leukocyte Esterase, Urine Negative Negative    Microscopic Examination Not Indicated     Urine Type Voided     Urine Reflex to Culture Not Indicated            RADIOLOGY  CT HEAD WO CONTRAST    Result Date: 8/17/2021  EXAMINATION: CT OF THE HEAD WITHOUT CONTRAST  8/17/2021 5:01 pm TECHNIQUE: CT of the head was performed without the administration of intravenous contrast. Dose modulation, iterative reconstruction, and/or weight based adjustment of the mA/kV was utilized to reduce the radiation dose to as low as reasonably achievable. COMPARISON: 08/04/2021 HISTORY: ORDERING SYSTEM PROVIDED HISTORY: r/o hydrocephalus TECHNOLOGIST PROVIDED HISTORY: If patient is on cardiac monitor and/or pulse ox, they may be taken off cardiac monitor and pulse ox, left on O2 if currently on. All monitors reattached when patient returns to room. Has a \"code stroke\" or \"stroke alert\" been called? ->No Reason for exam:->r/o hydrocephalus Decision Support Exception - unselect if not a suspected or confirmed emergency medical condition->Emergency Medical Condition (MA) Is the patient pregnant?->No Reason for Exam: PT. C/O RADIATING HA normal. No acute fracture is identified. 1.  No acute intracranial process. 2.  Unchanged appearance of left frontal approach  shunt catheter and nondilated ventricles. CT ABDOMEN PELVIS W IV CONTRAST Additional Contrast? None    Result Date: 2021  EXAMINATION: CT OF THE ABDOMEN AND PELVIS WITH CONTRAST 2021 4:46 pm TECHNIQUE: CT of the abdomen and pelvis was performed with the administration of intravenous contrast. Multiplanar reformatted images are provided for review. Dose modulation, iterative reconstruction, and/or weight based adjustment of the mA/kV was utilized to reduce the radiation dose to as low as reasonably achievable. COMPARISON: CT abdomen pelvis 2021. HISTORY: ORDERING SYSTEM PROVIDED HISTORY: abd pain/possible  shunt issue TECHNOLOGIST PROVIDED HISTORY: Reason for exam:->abd pain/possible  shunt issue Additional Contrast?->None Decision Support Exception - unselect if not a suspected or confirmed emergency medical condition->Emergency Medical Condition (MA) Reason for Exam: PT. C/O RADIATNG PAIN FROM LT. SIDE HEAD WHERE SHUNT IS DOWN TO  MID LT. ABD WHICH HAS REDNESS STATES FOR A LONG TIME WITH N/V, DENIES DIARRHEA Acuity: Acute Type of Exam: Initial Relevant Medical/Surgical History: HX FUNCTIONAL OVARIAN CYSTS, HX S/P CONE BIOPSY OF CERVIX, HX IBS, HX KIDNEY STONE, HX APPENDECTOMY, HX COLONOSCOPY, HX COLPOSCOPY, HX , HX CYSTOSCOPY, HX HYSTERECTOMY, HX LITHOTRIPSY, HX HERNIA REPAIR, HX HEMORRHOIDS, HX ABD SURGERY- REMOVAL ABD WALL MASS FINDINGS: Lower Chest: Lung bases demonstrate no focal consolidation or pleural effusion. Organs: Liver: Unremarkable on this phase of enhancement. Spleen: Unremarkable on this phase of enhancement. Pancreas: Unremarkable on this phase of enhancement. Adrenal glands: Unremarkable on this phase of enhancement. Kidneys: Unremarkable on this phase of enhancement. Nonobstructive right renal calculus. No left renal calculus.   No ureteral or bladder calculus. No hydronephrosis. Gallbladder: Surgically absent. Prominent extrahepatic biliary system appears similar to prior exam and likely represents chronic changes. GI/Bowel: Evaluation of the bowel and mesentery is limited due to lack of enteric contrast. Visualized esophagus/stomach: Unremarkable given lack of enteric contrast and degree of distension. Small bowel: No dilated small bowel. Large bowel: Scattered colonic diverticula without adjacent stranding. Appendix: Appendix or appendiceal stump appears unremarkable. Pelvis: Bladder is incompletely distended. Uterus is not visualized. Ovaries are not visualized. Distal end of ventriculoperitoneal shunt catheter noted. Tip terminates in the mid-pelvis. Peritoneum/Retroperitoneum: Adenopathy: Suboptimal evaluation for adenopathy due to lack of enteric contrast. Abdominal aorta: No abdominal aortic aneurysm. Free fluid/air: No free fluid. No free air. Bones/Soft Tissues: Body wall appears unremarkable. 1. No diverticulitis or small bowel obstruction. 2. No fluid or fluid collection adjacent to the ventriculoperitoneal shunt catheter tip. 3. Other findings as described. XR SHUNT SERIES PLACEMENT (<4 VIEWS)    Result Date: 8/4/2021  Shunt series HISTORY: Shunt dysfunction     6 views demonstrate left-sided ventriculoperitoneal shunt tubing terminating in the left lower abdomen. There is no visualized kink or break of the catheter. Moderate thoracic dextroscoliosis. Mild lumbar levoscoliosis. PROCEDURES    ED COURSE/MDM  Patient seen and evaluated. Old records reviewed.   Patient has had multiple CTs of her abdomen pelvis for the last several months she has had kidney stones before does not have blood in her urine, and no signs of infection in her urine, she has right flank pain and will have follow with her urologist, administered pain medication here, felt imaging at this time could be more harmful for patient without change in management. Reviewed her prescription history most are short-term prescriptions she has been referred to pain management. She states she only episodically has to take pain medication. CLINICAL IMPRESSION  1. Right flank pain        Blood pressure 104/74, pulse 106, temperature 98.2 °F (36.8 °C), temperature source Temporal, resp. rate 14, height 4' 11\" (1.499 m), weight 158 lb 8.2 oz (71.9 kg), last menstrual period 10/31/2016, SpO2 97 %, not currently breastfeeding. Author  was discharged to home in stable condition.                 Rome Cooks, MD  08/28/21 9499

## 2021-08-30 ENCOUNTER — PROCEDURE VISIT (OUTPATIENT)
Dept: GYNECOLOGY | Age: 41
End: 2021-08-30
Payer: COMMERCIAL

## 2021-08-30 VITALS
HEART RATE: 96 BPM | BODY MASS INDEX: 31.91 KG/M2 | SYSTOLIC BLOOD PRESSURE: 131 MMHG | TEMPERATURE: 97.3 F | WEIGHT: 158 LBS | DIASTOLIC BLOOD PRESSURE: 89 MMHG

## 2021-08-30 DIAGNOSIS — R87.610 ASCUS WITH POSITIVE HIGH RISK HPV CERVICAL: Primary | ICD-10-CM

## 2021-08-30 DIAGNOSIS — R87.810 ASCUS WITH POSITIVE HIGH RISK HPV CERVICAL: Primary | ICD-10-CM

## 2021-08-30 PROCEDURE — 57421 EXAM/BIOPSY OF VAG W/SCOPE: CPT | Performed by: OBSTETRICS & GYNECOLOGY

## 2021-08-30 NOTE — PROGRESS NOTES
Colposcopy Procedure Note    Indications: Pap smear 1 months ago showed: ASCUS with POSITIVE high risk HPV. The prior pap showed no abnormalities. Prior cervical/vaginal disease: normal exam without visible pathology. Prior cervical treatment: hysterectomy. Procedure Details   The risks and benefits of the procedure and Written informed consent obtained. Speculum placed in vagina and excellent visualization of cervix achieved, cervix swabbed x 3 with acetic acid solution. Findings:  Cervix:  n/a  Vaginal inspection: awe midcuff, bx taken, no bleeding  Vulvar colposcopy: normal mucosa without lesions. Specimens: mid cuff    Complications: none. Plan:  Specimens labelled and sent to Pathology. Will base further treatment on Pathology findings. Post biopsy instructions given to patient.

## 2021-08-31 ENCOUNTER — HOSPITAL ENCOUNTER (EMERGENCY)
Age: 41
Discharge: HOME | End: 2021-08-31
Payer: COMMERCIAL

## 2021-08-31 ENCOUNTER — APPOINTMENT (OUTPATIENT)
Dept: CT IMAGING | Age: 41
End: 2021-08-31
Payer: COMMERCIAL

## 2021-08-31 VITALS
TEMPERATURE: 98.5 F | WEIGHT: 158.8 LBS | OXYGEN SATURATION: 99 % | DIASTOLIC BLOOD PRESSURE: 80 MMHG | HEIGHT: 59 IN | RESPIRATION RATE: 17 BRPM | BODY MASS INDEX: 32.01 KG/M2 | HEART RATE: 89 BPM | SYSTOLIC BLOOD PRESSURE: 125 MMHG

## 2021-08-31 DIAGNOSIS — R10.30 LOWER ABDOMINAL PAIN: Primary | ICD-10-CM

## 2021-08-31 LAB
A/G RATIO: 1.4 (ref 1.1–2.2)
ALBUMIN SERPL-MCNC: 4.4 G/DL (ref 3.4–5)
ALP BLD-CCNC: 106 U/L (ref 40–129)
ALT SERPL-CCNC: 14 U/L (ref 10–40)
ANION GAP SERPL CALCULATED.3IONS-SCNC: 13 MMOL/L (ref 3–16)
AST SERPL-CCNC: 14 U/L (ref 15–37)
BACTERIA: ABNORMAL /HPF
BASOPHILS ABSOLUTE: 0.1 K/UL (ref 0–0.2)
BASOPHILS RELATIVE PERCENT: 1 %
BILIRUB SERPL-MCNC: 0.3 MG/DL (ref 0–1)
BILIRUBIN URINE: NEGATIVE
BLOOD, URINE: NEGATIVE
BUN BLDV-MCNC: 10 MG/DL (ref 7–20)
CALCIUM SERPL-MCNC: 9.8 MG/DL (ref 8.3–10.6)
CHLORIDE BLD-SCNC: 104 MMOL/L (ref 99–110)
CLARITY: CLEAR
CO2: 25 MMOL/L (ref 21–32)
COLOR: YELLOW
CREAT SERPL-MCNC: 0.7 MG/DL (ref 0.6–1.1)
EOSINOPHILS ABSOLUTE: 0.1 K/UL (ref 0–0.6)
EOSINOPHILS RELATIVE PERCENT: 1 %
EPITHELIAL CELLS, UA: 5 /HPF (ref 0–5)
GFR AFRICAN AMERICAN: >60
GFR NON-AFRICAN AMERICAN: >60
GLOBULIN: 3.2 G/DL
GLUCOSE BLD-MCNC: 118 MG/DL (ref 70–99)
GLUCOSE URINE: NEGATIVE MG/DL
HCT VFR BLD CALC: 43.6 % (ref 36–48)
HEMOGLOBIN: 15.3 G/DL (ref 12–16)
HYALINE CASTS: 4 /LPF (ref 0–8)
KETONES, URINE: NEGATIVE MG/DL
LEUKOCYTE ESTERASE, URINE: ABNORMAL
LIPASE: 14 U/L (ref 13–60)
LYMPHOCYTES ABSOLUTE: 2.1 K/UL (ref 1–5.1)
LYMPHOCYTES RELATIVE PERCENT: 28.1 %
MCH RBC QN AUTO: 29.9 PG (ref 26–34)
MCHC RBC AUTO-ENTMCNC: 35.2 G/DL (ref 31–36)
MCV RBC AUTO: 85.1 FL (ref 80–100)
MICROSCOPIC EXAMINATION: YES
MONOCYTES ABSOLUTE: 0.3 K/UL (ref 0–1.3)
MONOCYTES RELATIVE PERCENT: 4.2 %
NEUTROPHILS ABSOLUTE: 4.8 K/UL (ref 1.7–7.7)
NEUTROPHILS RELATIVE PERCENT: 65.7 %
NITRITE, URINE: NEGATIVE
PDW BLD-RTO: 13.6 % (ref 12.4–15.4)
PH UA: 6.5 (ref 5–8)
PLATELET # BLD: 241 K/UL (ref 135–450)
PMV BLD AUTO: 7.9 FL (ref 5–10.5)
POTASSIUM SERPL-SCNC: 3.9 MMOL/L (ref 3.5–5.1)
PROTEIN UA: NEGATIVE MG/DL
RBC # BLD: 5.13 M/UL (ref 4–5.2)
RBC UA: 2 /HPF (ref 0–4)
SODIUM BLD-SCNC: 142 MMOL/L (ref 136–145)
SPECIFIC GRAVITY UA: <1.005 (ref 1–1.03)
TOTAL PROTEIN: 7.6 G/DL (ref 6.4–8.2)
URINE REFLEX TO CULTURE: ABNORMAL
URINE TYPE: ABNORMAL
UROBILINOGEN, URINE: 0.2 E.U./DL
WBC # BLD: 7.3 K/UL (ref 4–11)
WBC UA: 7 /HPF (ref 0–5)

## 2021-08-31 PROCEDURE — 6360000004 HC RX CONTRAST MEDICATION: Performed by: PHYSICIAN ASSISTANT

## 2021-08-31 PROCEDURE — 2580000003 HC RX 258: Performed by: PHYSICIAN ASSISTANT

## 2021-08-31 PROCEDURE — 81001 URINALYSIS AUTO W/SCOPE: CPT

## 2021-08-31 PROCEDURE — 36415 COLL VENOUS BLD VENIPUNCTURE: CPT

## 2021-08-31 PROCEDURE — 74177 CT ABD & PELVIS W/CONTRAST: CPT

## 2021-08-31 PROCEDURE — 83690 ASSAY OF LIPASE: CPT

## 2021-08-31 PROCEDURE — 6360000002 HC RX W HCPCS: Performed by: PHYSICIAN ASSISTANT

## 2021-08-31 PROCEDURE — 80053 COMPREHEN METABOLIC PANEL: CPT

## 2021-08-31 PROCEDURE — 99284 EMERGENCY DEPT VISIT MOD MDM: CPT

## 2021-08-31 PROCEDURE — 96375 TX/PRO/DX INJ NEW DRUG ADDON: CPT

## 2021-08-31 PROCEDURE — 96376 TX/PRO/DX INJ SAME DRUG ADON: CPT

## 2021-08-31 PROCEDURE — 85025 COMPLETE CBC W/AUTO DIFF WBC: CPT

## 2021-08-31 PROCEDURE — 96374 THER/PROPH/DIAG INJ IV PUSH: CPT

## 2021-08-31 RX ORDER — ONDANSETRON 4 MG/1
4 TABLET, ORALLY DISINTEGRATING ORAL EVERY 12 HOURS PRN
Qty: 12 TABLET | Refills: 0 | Status: SHIPPED | OUTPATIENT
Start: 2021-08-31 | End: 2021-09-06

## 2021-08-31 RX ORDER — 0.9 % SODIUM CHLORIDE 0.9 %
1000 INTRAVENOUS SOLUTION INTRAVENOUS ONCE
Status: COMPLETED | OUTPATIENT
Start: 2021-08-31 | End: 2021-08-31

## 2021-08-31 RX ORDER — ONDANSETRON 2 MG/ML
4 INJECTION INTRAMUSCULAR; INTRAVENOUS ONCE
Status: COMPLETED | OUTPATIENT
Start: 2021-08-31 | End: 2021-08-31

## 2021-08-31 RX ORDER — MORPHINE SULFATE 4 MG/ML
4 INJECTION, SOLUTION INTRAMUSCULAR; INTRAVENOUS ONCE
Status: COMPLETED | OUTPATIENT
Start: 2021-08-31 | End: 2021-08-31

## 2021-08-31 RX ORDER — MORPHINE SULFATE 2 MG/ML
2 INJECTION, SOLUTION INTRAMUSCULAR; INTRAVENOUS ONCE
Status: COMPLETED | OUTPATIENT
Start: 2021-08-31 | End: 2021-08-31

## 2021-08-31 RX ORDER — TRAMADOL HYDROCHLORIDE 50 MG/1
50 TABLET ORAL EVERY 6 HOURS PRN
Qty: 12 TABLET | Refills: 0 | Status: SHIPPED | OUTPATIENT
Start: 2021-08-31 | End: 2021-09-03

## 2021-08-31 RX ADMIN — MORPHINE SULFATE 2 MG: 2 INJECTION, SOLUTION INTRAMUSCULAR; INTRAVENOUS at 18:42

## 2021-08-31 RX ADMIN — MORPHINE SULFATE 4 MG: 4 INJECTION INTRAVENOUS at 17:18

## 2021-08-31 RX ADMIN — IOPAMIDOL 75 ML: 755 INJECTION, SOLUTION INTRAVENOUS at 17:54

## 2021-08-31 RX ADMIN — SODIUM CHLORIDE 1000 ML: 9 INJECTION, SOLUTION INTRAVENOUS at 17:19

## 2021-08-31 RX ADMIN — ONDANSETRON 4 MG: 2 INJECTION INTRAMUSCULAR; INTRAVENOUS at 17:19

## 2021-08-31 ASSESSMENT — ENCOUNTER SYMPTOMS
ABDOMINAL PAIN: 1
VOMITING: 0
EYE REDNESS: 0
SORE THROAT: 0
BACK PAIN: 0
NAUSEA: 1
CHOKING: 0
EYE DISCHARGE: 0
SHORTNESS OF BREATH: 0
FACIAL SWELLING: 0
APNEA: 0

## 2021-08-31 ASSESSMENT — PAIN DESCRIPTION - PAIN TYPE: TYPE: ACUTE PAIN

## 2021-08-31 ASSESSMENT — PAIN DESCRIPTION - DESCRIPTORS: DESCRIPTORS: SHARP;SHOOTING

## 2021-08-31 ASSESSMENT — PAIN DESCRIPTION - ORIENTATION: ORIENTATION: RIGHT;LOWER

## 2021-08-31 ASSESSMENT — PAIN DESCRIPTION - LOCATION: LOCATION: ABDOMEN;PELVIS

## 2021-08-31 ASSESSMENT — PAIN SCALES - GENERAL
PAINLEVEL_OUTOF10: 10
PAINLEVEL_OUTOF10: 10
PAINLEVEL_OUTOF10: 8

## 2021-08-31 ASSESSMENT — PAIN DESCRIPTION - FREQUENCY: FREQUENCY: CONTINUOUS

## 2021-08-31 ASSESSMENT — PAIN DESCRIPTION - ONSET: ONSET: SUDDEN

## 2021-08-31 ASSESSMENT — PAIN DESCRIPTION - PROGRESSION: CLINICAL_PROGRESSION: GRADUALLY WORSENING

## 2021-08-31 ASSESSMENT — PAIN - FUNCTIONAL ASSESSMENT: PAIN_FUNCTIONAL_ASSESSMENT: PREVENTS OR INTERFERES WITH MANY ACTIVE NOT PASSIVE ACTIVITIES

## 2021-08-31 NOTE — ED PROVIDER NOTES
**ADVANCED PRACTICE PROVIDER, I HAVE EVALUATED THIS PATIENT**        [address]      Pt Name: Collette Smack  YPX:[id number]  [place] 1980  Date of evaluation: 8/31/2021  Provider: Coralee Gaucher, PA-C      Chief Complaint:  No chief complaint on file. Nursing Notes, Past Medical Hx, Past Surgical Hx, Social Hx, Allergies, and Family Hx were all reviewed and agreed with or any disagreements were addressed in the HPI.    HPI: (Location, Duration, Timing, Severity, Quality, Assoc Sx, Context, Modifying factors)  This is a  47 y.o. female who presents to the emergency room with chief complaint of lower abdominal pain. Patient states she had a biopsy done by Dr. Audra Bridges on yesterday. She went home and she took a nap woke up and said severe pain to the lower abdomen and off to the right side. Denies any vaginal bleeding but she does notice some slight blood when she wipes. Denies back pain. No chest pain. No weakness. Says she was told to take Tylenol Motrin for pain. Rated pain 8 out of 10. No other complaints. Pain is mainly suprapubically. PastMedical/Surgical History:      Diagnosis Date    ADHD (attention deficit hyperactivity disorder) 82    Depression with anxiety     Diabetes mellitus ([address]     pre-diabetes    Difficult intubation     Encounter for imaging to screen for metal prior to MRI 06/01/2021    MRI Conditional Medtronic Non-Programmable shunt model#[id number] implanted 10/30/2020 at [facility]. Normal Mode. 1.5T or 3.0T.    ESBL (extended spectrum beta-lactamase) producing bacteria infection 11/06/2019    urine    Functional ovarian cysts 2008    rt ovary cyst x 2 yrs.     Headache(784.0)     migraines    History of blood transfusion     at birth    History of kidney stones     History of PCOS     Hydrocephalus ([address]     Hyperlipidemia     Irritable bowel syndrome 2004    had colonoscopy about 6 yrs ago    Meningitis 212    Neutrophilic leukocytosis     Nicotine dependence     PONV (postoperative nausea and vomiting)     very nauseated and sometimes wakes up with a Migraine--happened once after brain surgery    Primary osteoarthritis of left knee 2016    S/P cone biopsy of cervix 2004    Scoliosis .s     (ventriculoperitoneal) shunt status     hydrocephalus f/w Neuro[facility]geon [facility]     (ventriculoperitoneal) shunt status     Wears glasses     reading         Procedure Laterality Date    ABDOMEN SURGERY N/A 2021    REMOVAL OF ABDOMINAL WALL MASS performed by Peggy Cardenas MD at [address] [place] [address]      appendicitis     SECTION  2005    placenta previa    CHOLECYSTECTOMY      COLONOSCOPY  2004    COLPOSCOPY      CSF SHUNT      replaced at age 17   [facility]      stone removal    HEMORRHOID SURGERY      HERNIA REPAIR Bilateral     inguinal    HERNIA REPAIR      hiatal hernia    HYSTERECTOMY, TOTAL ABDOMINAL  2016    TAHBSO, adhesions/PCOS    KNEE ARTHROSCOPY Left 2015    medial meniscectomy, chondroplasty, plica resection    LITHOTRIPSY Bilateral 12/10/2019    OTHER SURGICAL HISTORY  10/19/2016    op lap    VENTRICULOPERITONEAL SHUNT      multiple revisions, most recent        Medications:  Previous Medications    ALPRAZOLAM (XANAX) 0.5 MG TABLET        HYDROCODONE-ACETAMINOPHEN (NORCO) 5-325 MG PER TABLET    Take 1 tablet by mouth every 4 hours as needed for Pain for up to 3 days. Intended supply: 3 days. Take lowest dose possible to manage pain    LISDEXAMFETAMINE (VYVANSE) 50 MG CAPSULE    Take 30 mg by mouth every morning.     PROMETHAZINE (PHENERGAN) 25 MG TABLET    Take 1 tablet by mouth 4 times daily as needed for Nausea    SIMVASTATIN (ZOCOR) 10 MG TABLET    Take 1 tablet by mouth nightly    SUMATRIPTAN (IMITREX) 100 MG TABLET    Take 1 tablet by mouth once as needed for Migraine TERBINAFINE (LAMISIL) 250 MG TABLET    Take 1 tablet by mouth daily         Review of Systems:  (2-9 systems needed)  Review of Systems   Constitutional: Negative for chills and fever. HENT: Negative for congestion, facial swelling and sore throat. Eyes: Negative for discharge and redness. Respiratory: Negative for apnea, choking and shortness of breath. Cardiovascular: Negative for chest pain. Gastrointestinal: Positive for abdominal pain and nausea. Negative for vomiting. Genitourinary: Negative for dysuria. Musculoskeletal: Negative for back pain, neck pain and neck stiffness. Neurological: Negative for dizziness, tremors, seizures, weakness and headaches. All other systems reviewed and are negative. \"Positives and Pertinent negatives as per HPI\"    Physical Exam:  Physical Exam  Vitals and nursing note reviewed. Constitutional:       Appearance: She is well-developed. She is not diaphoretic. HENT:      Head: Normocephalic and atraumatic. Nose: Nose normal.      Mouth/Throat:      Mouth: Mucous membranes are moist.   Eyes:      General:         Right eye: No discharge. Left eye: No discharge. Cardiovascular:      Rate and Rhythm: Normal rate and regular rhythm. Heart sounds: Normal heart sounds. No murmur heard. No friction rub. No gallop. Pulmonary:      Effort: Pulmonary effort is normal. No respiratory distress. Breath sounds: Normal breath sounds. No wheezing or rales. Chest:      Chest wall: No tenderness. Abdominal:      General: Abdomen is flat. Bowel sounds are normal. There is no distension. Palpations: Abdomen is soft. There is no mass. Tenderness: There is abdominal tenderness in the suprapubic area. There is no guarding or rebound. Musculoskeletal:         General: Normal range of motion. Cervical back: Normal range of motion and neck supple. Skin:     General: Skin is warm and dry.    Neurological:      General: No focal deficit present. Mental Status: She is alert and oriented to person, place, and time. Psychiatric:         Behavior: Behavior normal.         MEDICAL DECISION MAKING    Vitals:    Vitals:    08/31/21 1622   BP: (!) 133/99   Pulse: 107   Resp: 18   Temp: 98.8 °F (37.1 °C)   TempSrc: Oral   SpO2: 99%   Weight: 158 lb 12.8 oz (72 kg)   Height: 4' 11\" (1.499 m)       LABS:  Labs Reviewed   COMPREHENSIVE METABOLIC PANEL - Abnormal; Notable for the following components:       Result Value    Glucose 118 (*)     AST 14 (*)     All other components within normal limits    Narrative:     Performed at:  [address] [facility] [address]   Phone [phone]   URINE RT REFLEX TO CULTURE - Abnormal; Notable for the following components:    Leukocyte Esterase, Urine SMALL (*)     All other components within normal limits    Narrative:     Performed at:  [address] [facility] [address]   Phone [phone]   MICROSCOPIC URINALYSIS - Abnormal; Notable for the following components:    Bacteria, UA 1+ (*)     WBC, UA 7 (*)     All other components within normal limits    Narrative:     Performed at:  [address] [facility] [address]   Phone [phone]   CBC WITH AUTO DIFFERENTIAL    Narrative:     Performed at:  [address] [facility] [address]   Phone [phone]   LIPASE    Narrative:     Performed at:  [address] [place] [address]   Phone [phone] of labs reviewed and were negative at this time or not returned at the time of this note.     RADIOLOGY:   Non-plain film images such as CT, Ultrasound and MRI are read by the radiologist. Janis Cardenas PA-C have directly visualized the radiologic plain film PROVIDED HISTORY: abd pain/possible  shunt issue TECHNOLOGIST PROVIDED HISTORY: Reason for exam:->abd pain/possible  shunt issue Additional Contrast?->None Decision Support Exception - unselect if not a suspected or confirmed emergency medical condition->Emergency Medical Condition (MA) Reason for Exam: PT. C/O RADIATNG PAIN FROM LT. SIDE HEAD WHERE SHUNT IS DOWN TO  MID LT. ABD WHICH HAS REDNESS STATES FOR A LONG TIME WITH N/V, DENIES DIARRHEA Acuity: Acute Type of Exam: Initial Relevant Medical/Surgical History: HX FUNCTIONAL OVARIAN CYSTS, HX S/P CONE BIOPSY OF CERVIX, HX IBS, HX KIDNEY STONE, HX APPENDECTOMY, HX COLONOSCOPY, HX COLPOSCOPY, HX , HX CYSTOSCOPY, HX HYSTERECTOMY, HX LITHOTRIPSY, HX HERNIA REPAIR, HX HEMORRHOIDS, HX ABD SURGERY- REMOVAL ABD WALL MASS FINDINGS: Lower Chest: Lung bases demonstrate no focal consolidation or pleural effusion. Organs: Liver: Unremarkable on this phase of enhancement. Spleen: Unremarkable on this phase of enhancement. Pancreas: Unremarkable on this phase of enhancement. Adrenal glands: Unremarkable on this phase of enhancement. Kidneys: Unremarkable on this phase of enhancement. Nonobstructive right renal calculus. No left renal calculus. No ureteral or bladder calculus. No hydronephrosis. Gallbladder: Surgically absent. Prominent extrahepatic biliary system appears similar to prior exam and likely represents chronic changes. GI/Bowel: Evaluation of the bowel and mesentery is limited due to lack of enteric contrast. Visualized esophagus/stomach: Unremarkable given lack of enteric contrast and degree of distension. Small bowel: No dilated small bowel. Large bowel: Scattered colonic diverticula without adjacent stranding. Appendix: Appendix or appendiceal stump appears unremarkable. Pelvis: Bladder is incompletely distended. Uterus is not visualized. Ovaries are not visualized. Distal end of ventriculoperitoneal shunt catheter noted.  Tip terminates in the mid-pelvis. Peritoneum/Retroperitoneum: Adenopathy: Suboptimal evaluation for adenopathy due to lack of enteric contrast. Abdominal aorta: No abdominal aortic aneurysm. Free fluid/air: No free fluid. No free air. Bones/Soft Tissues: Body wall appears unremarkable. 1. No diverticulitis or small bowel obstruction. 2. No fluid or fluid collection adjacent to the ventriculoperitoneal shunt catheter tip. 3. Other findings as described. XR SHUNT SERIES PLACEMENT (<4 VIEWS)    Result Date: 8/4/2021  Shunt series HISTORY: Shunt dysfunction     6 views demonstrate left-sided ventriculoperitoneal shunt tubing terminating in the left lower abdomen. There is no visualized kink or break of the catheter. Moderate thoracic dextroscoliosis. Mild lumbar levoscoliosis. MEDICAL DECISION MAKING / ED COURSE:      PROCEDURES:   Procedures    None    Patient was given:  Medications   0.9 % sodium chloride bolus (0 mLs IntraVENous Stopped 8/31/21 1842)   ondansetron (ZOFRAN) injection 4 mg (4 mg IntraVENous Given 8/31/21 1719)   morphine (PF) injection 4 mg (4 mg IntraVENous Given 8/31/21 1718)   iopamidol (ISOVUE-370) 76 % injection 75 mL (75 mLs IntraVENous Given 8/31/21 1754)   morphine (PF) injection 2 mg (2 mg IntraVENous Given 8/31/21 1842)       Emergency room course: Patient on exam throat is clear nonerythematous no exudate. Cardiovascular regular rhythm, lungs are clear. No wheeze rales or rhonchi noted. Abdomen is soft with mild suprapubic tenderness and slightly right sided tenderness with palpation. No palpable mass. Nondistended. No flank tenderness bilaterally. Full range of motion all extremity patient is alert oriented x4. Does not appear to be in acute distress. Lab result from today shows:  CBC within normal limits with a white count of 7.3. CMP unremarkable. Lipase 14.0. Urinalysis shows small leukocytes, negative nitrates, negative blood, negative for ketones.   Microscopically bacteria is 1+, hyaline casts 4, WBC 7, RBC 2 epithelial cells 5. CT scan abdomen and pelvis with IV contrast shows no acute CT abnormality within the abdomen or pelvis. Discussed patient CT and lab result from today. Put on tramadol for pain follow-up with Dr. Yun Zamarripa. She will be discharged stable condition. The patient tolerated their visit well. I evaluated the patient. The physician was available for consultation as needed. The patient and / or the family were informed of the results of any tests, a time was given to answer questions, a plan was proposed and they agreed with plan. CLINICAL IMPRESSION:  1. Lower abdominal pain        DISPOSITION  DISPOSITION Decision To Discharge 08/31/2021 08:33:07 PM          PATIENT REFERRED TO:  Hillary Reyes, [address] [place] [address]  [address] [place] [address]  [phone]    Call in 1 day      Margie Espino MD  [address]  [phone]    Call in 1 day        DISCHARGE MEDICATIONS:  New Prescriptions    ONDANSETRON (ZOFRAN ODT) 4 MG DISINTEGRATING TABLET    Take 1 tablet by mouth every 12 hours as needed for Nausea    TRAMADOL (ULTRAM) 50 MG TABLET    Take 1 tablet by mouth every 6 hours as needed for Pain (MAY TAKE 2 TABS EVERY 6 HOURS FOR SEVERE PAIN) for up to 3 days.        DISCONTINUED MEDICATIONS:  Discontinued Medications    No medications on file              (Please note the MDM and HPI sections of this note were completed with a voice recognition program.  Efforts were made to edit the dictations but occasionally words are mis-transcribed.)    Electronically signed, Aidan Powell PA-C,          Aidan Powell PA-C  08/31/21 2037

## 2021-09-06 ENCOUNTER — HOSPITAL ENCOUNTER (EMERGENCY)
Age: 41
Discharge: HOME | End: 2021-09-06
Payer: COMMERCIAL

## 2021-09-06 VITALS
HEIGHT: 59 IN | BODY MASS INDEX: 31.87 KG/M2 | HEART RATE: 80 BPM | RESPIRATION RATE: 18 BRPM | OXYGEN SATURATION: 97 % | DIASTOLIC BLOOD PRESSURE: 86 MMHG | SYSTOLIC BLOOD PRESSURE: 105 MMHG | WEIGHT: 158.07 LBS | TEMPERATURE: 98.6 F

## 2021-09-06 DIAGNOSIS — H00.022 HORDEOLUM INTERNUM OF RIGHT LOWER EYELID: Primary | ICD-10-CM

## 2021-09-06 DIAGNOSIS — R11.2 NON-INTRACTABLE VOMITING WITH NAUSEA, UNSPECIFIED VOMITING TYPE: ICD-10-CM

## 2021-09-06 PROCEDURE — 99283 EMERGENCY DEPT VISIT LOW MDM: CPT

## 2021-09-06 RX ORDER — PROMETHAZINE HYDROCHLORIDE 25 MG/1
25 TABLET ORAL EVERY 6 HOURS PRN
Qty: 10 TABLET | Refills: 0 | Status: SHIPPED | OUTPATIENT
Start: 2021-09-06 | End: 2021-09-10 | Stop reason: SDUPTHER

## 2021-09-06 RX ORDER — ERYTHROMYCIN 5 MG/G
OINTMENT OPHTHALMIC
Qty: 3.5 G | Refills: 0 | Status: SHIPPED | OUTPATIENT
Start: 2021-09-06 | End: 2021-09-16

## 2021-09-06 ASSESSMENT — PAIN DESCRIPTION - ORIENTATION: ORIENTATION: RIGHT

## 2021-09-06 ASSESSMENT — PAIN DESCRIPTION - LOCATION: LOCATION: EYE

## 2021-09-06 ASSESSMENT — VISUAL ACUITY
OD: 20/30
OS: 20/30
OU: 20/20

## 2021-09-06 ASSESSMENT — PAIN SCALES - GENERAL: PAINLEVEL_OUTOF10: 7

## 2021-09-06 ASSESSMENT — PAIN DESCRIPTION - DESCRIPTORS: DESCRIPTORS: BURNING

## 2021-09-06 ASSESSMENT — PAIN DESCRIPTION - PAIN TYPE: TYPE: ACUTE PAIN

## 2021-09-07 NOTE — ED PROVIDER NOTES
[facility]  [address] [facility] [address]  Dept: [phone]  Loc: [address] ENCOUNTER        This patient was not seen or evaluated by the attending physician. I evaluated this patient, the attending physician was available for consultation. CHIEF COMPLAINT    Chief Complaint   Patient presents with    Eye Problem     swelling to R lower eyelid x2days    Emesis     x2 today       HPI    Gabe Aguilar is a 47 y.o. female who presents with right lower eyelid pain, swelling. Onset was 2 days ago. The duration has been constant since the onset. The quality is that it is an aching pain. The patient has associated discomfort under her eye. There are no aggravating or alleviating factors. The context was that it started spontaneously. She recently threw away old makeup, but really doesn't wear it much anyway. She also denies use of corrective lenses. She also reports she has had two episodes of vomiting today, and feels nauseated. She has no associated abdominal pain, fevers, or chills. Denies any other symptoms related to the vomiting. She has no further concerns at this time. REVIEW OF SYSTEMS    Eyes: see HPI  General: No fevers or chills  GI: With nausea, vomiting; denies abdominal pain  Skin: No new rash    PAST MEDICAL and SURGICAL HISTORY    Past Medical History:   Diagnosis Date    ADHD (attention deficit hyperactivity disorder) 82    Depression with anxiety     Diabetes mellitus ([facility]     pre-diabetes    Difficult intubation     Encounter for imaging to screen for metal prior to MRI 06/01/2021    MRI Conditional Medtronic Non-Programmable shunt model#[id number] implanted 10/30/2020 at [facility]. Normal Mode. 1.5T or 3.0T.    ESBL (extended spectrum beta-lactamase) producing bacteria infection 11/06/2019    urine    Functional ovarian cysts 2008    rt ovary cyst x 2 yrs.     Headache(784.0)     migraines    History of blood transfusion     at birth   Girma Jeffrey History of kidney stones     History of PCOS     Hydrocephalus ([facility]     Hyperlipidemia     Irritable bowel syndrome 2004    had colonoscopy about 6 yrs ago    Meningitis     Neutrophilic leukocytosis     Nicotine dependence     PONV (postoperative nausea and vomiting)     very nauseated and sometimes wakes up with a Migraine--happened once after brain surgery    Primary osteoarthritis of left knee 2016    S/P cone biopsy of cervix 2004    Scoliosis .s     (ventriculoperitoneal) shunt status     hydrocephalus f/w [facility]surgeon at [facility]     (ventriculoperitoneal) shunt status     Wears glasses     reading     Past Surgical History:   Procedure Laterality Date    ABDOMEN SURGERY N/A 2021    REMOVAL OF ABDOMINAL WALL MASS performed by Shirley Perez MD at [address] [place] [address]      appendicitis     SECTION      placenta previa    CHOLECYSTECTOMY      COLONOSCOPY  2004    COLPOSCOPY      CSF SHUNT      replaced at age 17   [facility] CYSTOSCOPY      stone removal    HEMORRHOID SURGERY      HERNIA REPAIR Bilateral     inguinal    HERNIA REPAIR      hiatal hernia    HYSTERECTOMY, TOTAL ABDOMINAL  2016    TAHBSO, adhesions/PCOS    KNEE ARTHROSCOPY Left 2015    medial meniscectomy, chondroplasty, plica resection    LITHOTRIPSY Bilateral 12/10/2019    OTHER SURGICAL HISTORY  10/19/2016    op lap    VENTRICULOPERITONEAL SHUNT      multiple revisions, most recent        CURRENT MEDICATIONS  (may include discharge medications prescribed in the ED)  Current Outpatient Rx   Medication Sig Dispense Refill    promethazine (PHENERGAN) 25 MG tablet Take 1 tablet by mouth every 6 hours as needed for Nausea WARNING:  May cause drowsiness. May impair ability to operate vehicles or machinery. Do not use in combination with alcohol.  10 tablet 0    erythromycin [facility]) 5 MG/GM ophthalmic ointment Apply 1cm ribbon to right lower lid every 4 hours for 7 days. 3.5 g 0    terbinafine (LAMISIL) 250 MG tablet Take 1 tablet by mouth daily 42 tablet 0    ALPRAZolam (XANAX) 0.5 MG tablet       lisdexamfetamine (VYVANSE) 50 MG capsule Take 30 mg by mouth every morning.  simvastatin (ZOCOR) 10 MG tablet Take 1 tablet by mouth nightly 90 tablet 1       ALLERGIES    Allergies   Allergen Reactions    Bee Venom Shortness Of Breath and Swelling    Bentyl [Dicyclomine Hcl] Shortness Of Breath and Anxiety    Mushroom Extract Complex Anaphylaxis     Can't eat mushrooms.  Sulfa Antibiotics Shortness Of Breath    Vancomycin Anaphylaxis and Hives    Adhesive Tape Dermatitis    Toradol [Ketorolac Tromethamine] Hives and Itching     Injectable only  Tolerates PO NSAIDs fine    Acetaminophen Anxiety     Patient reports when taking acetaminophen (including Norco/Percocet) she gets severe anxiety when taking this medication.      Ceftaroline Rash    Daptomycin Swelling and Rash    Dicyclomine Anxiety    Fioricet-Codeine [Butalbital-Apap-Caff-Cod] Anxiety    Levaquin [Levofloxacin] Anxiety    Methocarbamol Rash    Prochlorperazine Anxiety    Tazobactam Nausea And Vomiting and Anxiety    Zosyn [Piperacillin Sod-Tazobactam So] Hives     Also causes nausea, SOB, dizziness       SOCIAL and FAMILY HISTORY    Social History     Socioeconomic History    Marital status: Single     Spouse name: None    Number of children: None    Years of education: None    Highest education level: None   Occupational History    Occupation: disability, SSI    Tobacco Use    Smoking status: Current Some Day Smoker     Packs/day: 0.50     Years: 18.00     Pack years: 9.00     Types: Cigarettes    Smokeless tobacco: Never Used   Vaping Use    Vaping Use: Never used   Substance and Sexual Activity    Alcohol use: No     Alcohol/week: 0.0 standard drinks    Drug use: Never    Sexual 4' 11\" (1.499 m)   Wt 158 lb 1.1 oz (71.7 kg)   LMP 10/31/2016 (Exact Date)   SpO2 99%   BMI 31.93 kg/m²   Constitutional:  Well developed, well nourished, no acute distress  Eyes:  Pupils equally round and reactive to light, extraocular movements intact, conjunctiva of the affected eye is not injected, no chemosis, no discharge, no hyphema, no hypopyon, no pain when contralateral eye is exposed to light, no foreign body visualized, globe intact without deformity, no pain with movement of eye. Noted to have an internal stye to lower right lid at approximately 7:00, over which pt is more tender. Visual acuity, not corrected:   20/30 on Right  20/30 on Left  HENT:  Atraumatic, external ears normal, nose normal, with mild right sided periorbital edema without erythema  Neck: no neck swelling   Respiratory:  No retractions  Cardiovascular:  No JVD, heart with regular rate/rhythm  Abdomen: soft, non tender, BS noted in all 4 quadrants  Integument:  Warm dry skin, no obvious rash  Neurologic: Awake, alert and oriented, no slurred speech    RADIOLOGY  No orders to display       ED [facility]    See electronic medical records for medications prescribed    Differential diagnosis: Iritis, Uveitis, Conjunctivitis, Herpes Keratitis, Corneal Ulcer, Corneal Abrasion, Acute Angle Glaucoma, Orbital Cellulitis/Abscess, Periorbital/Preseptal Cellulitis, other. Patient is afebrile and nontoxic in appearance. On physical exam, the patient does have evidence of a stye to her right lower inner lid. This is likely what is contributing to the periorbital edema and tenderness. She has no visual acuity changes, she has no decreased extraocular movement. She will be discharged home with topical antibiotics.   We did discuss the pros and cons of prescribing oral antibiotics related to the eye, the patient states that she is extremely sensitive to oral antibiotics and would prefer to try topical medications at this time, and will return if any of her symptoms get worse. I did also offer to perform blood work and provide IV fluids, however patient declined. She did request that she be discharged home with prescription for Phenergan, as Zofran does not typically help her symptoms. I will prescribe this for her. She also requested a 3-day course of tramadol, which I will not prescribe for her, she will need to follow-up with her primary care provider for her pain medication. The patient was instructed to follow up as an outpatient in 1 day. The patient was instructed to return to the ED immediately for any new or worsening symptoms. The patient verbalized understanding. FINAL IMPRESSION    1. Hordeolum internum of right lower eyelid    2.  Non-intractable vomiting with nausea, unspecified vomiting type        PLAN  Discharge with medication and followup with PCP (see EMR)      (Please note that this note was completed with a voice recognition program.  Every attempt was made to edit the dictations, but inevitably there remain words that are mis-transcribed.)          Radha Anand  09/06/21 2022

## 2021-09-09 NOTE — ED NOTES
Pt resting in stretcher at this time. Provided with blanket. Pt denies any SOB or throat tightness. Denies any further needs, call light within reach, will continue to monitor.         Amadeo Castellanos RN  08/15/18 7120 Resolved.

## 2021-09-10 ENCOUNTER — TELEPHONE (OUTPATIENT)
Dept: PRIMARY CARE CLINIC | Age: 41
End: 2021-09-10

## 2021-09-10 DIAGNOSIS — N20.0 KIDNEY STONE: Primary | ICD-10-CM

## 2021-09-10 RX ORDER — PROMETHAZINE HYDROCHLORIDE 25 MG/1
25 TABLET ORAL EVERY 6 HOURS PRN
Qty: 10 TABLET | Refills: 0 | Status: SHIPPED | OUTPATIENT
Start: 2021-09-10 | End: 2021-09-17

## 2021-09-10 RX ORDER — TRAMADOL HYDROCHLORIDE 50 MG/1
50 TABLET ORAL DAILY PRN
Qty: 4 TABLET | Refills: 0 | Status: SHIPPED | OUTPATIENT
Start: 2021-09-10 | End: 2021-09-14

## 2021-09-10 RX ORDER — TAMSULOSIN HYDROCHLORIDE 0.4 MG/1
0.4 CAPSULE ORAL DAILY
Qty: 30 CAPSULE | Refills: 0 | Status: SHIPPED | OUTPATIENT
Start: 2021-09-10 | End: 2021-10-12

## 2021-09-10 NOTE — TELEPHONE ENCOUNTER
Pt called on call provider to discuss symptoms of abdominal pain and bloating. States that her abdomen had been improving, but since early this week has started bloating again and then having severe pain. States that when she lived in [place] she had cysts from her  shunt and a plastic surgeon stated he would remove them. Also she is concerned as her CT scan last week showed \"free fluid\" in her pelvis. She states she is vomiting due to the pain and it is hard to keep things down. Explained to her that the small amount of free fluid could be a lot of things: inflammation, scar tissue, residual from recent cellulitis and lipoma removal.  Instructed pt that if she can tolerate pain with tylenol, ibuprofen and keep fluids down, then she can call in the moring and we can order a CT scan or an ultrasound to evaluate her stomach. If not, she needs to go to the ER. Pt verbalizes understanding.

## 2021-09-10 NOTE — TELEPHONE ENCOUNTER
Rx for more phenergan sent. Kidney stone is non obstructing per CT scan, so should pass soon, especially with being on flomax. A couple tramadol sent to the pharmacy. No more able to be given.

## 2021-09-12 ENCOUNTER — HOSPITAL ENCOUNTER (EMERGENCY)
Age: 41
Discharge: HOME | End: 2021-09-12
Payer: COMMERCIAL

## 2021-09-12 ENCOUNTER — APPOINTMENT (OUTPATIENT)
Dept: CT IMAGING | Age: 41
End: 2021-09-12
Payer: COMMERCIAL

## 2021-09-12 VITALS
HEART RATE: 82 BPM | WEIGHT: 160.5 LBS | TEMPERATURE: 98.8 F | DIASTOLIC BLOOD PRESSURE: 93 MMHG | RESPIRATION RATE: 20 BRPM | SYSTOLIC BLOOD PRESSURE: 135 MMHG | BODY MASS INDEX: 32.36 KG/M2 | HEIGHT: 59 IN | OXYGEN SATURATION: 98 %

## 2021-09-12 DIAGNOSIS — R10.9 FLANK PAIN: Primary | ICD-10-CM

## 2021-09-12 LAB
BILIRUBIN URINE: NEGATIVE
BLOOD, URINE: NEGATIVE
CLARITY: CLEAR
COLOR: YELLOW
GLUCOSE URINE: NEGATIVE MG/DL
KETONES, URINE: NEGATIVE MG/DL
LEUKOCYTE ESTERASE, URINE: NEGATIVE
MICROSCOPIC EXAMINATION: NORMAL
NITRITE, URINE: NEGATIVE
PH UA: 6.5 (ref 5–8)
PROTEIN UA: NEGATIVE MG/DL
SPECIFIC GRAVITY UA: 1.02 (ref 1–1.03)
URINE REFLEX TO CULTURE: NORMAL
URINE TYPE: NORMAL
UROBILINOGEN, URINE: 0.2 E.U./DL

## 2021-09-12 PROCEDURE — 6370000000 HC RX 637 (ALT 250 FOR IP): Performed by: PHYSICIAN ASSISTANT

## 2021-09-12 PROCEDURE — 99284 EMERGENCY DEPT VISIT MOD MDM: CPT

## 2021-09-12 PROCEDURE — 74176 CT ABD & PELVIS W/O CONTRAST: CPT

## 2021-09-12 PROCEDURE — 81003 URINALYSIS AUTO W/O SCOPE: CPT

## 2021-09-12 RX ORDER — OXYCODONE HYDROCHLORIDE 5 MG/1
5 TABLET ORAL ONCE
Status: COMPLETED | OUTPATIENT
Start: 2021-09-12 | End: 2021-09-12

## 2021-09-12 RX ORDER — TRAMADOL HYDROCHLORIDE 50 MG/1
50 TABLET ORAL EVERY 6 HOURS PRN
Qty: 4 TABLET | Refills: 0 | Status: SHIPPED | OUTPATIENT
Start: 2021-09-12 | End: 2021-09-15

## 2021-09-12 RX ORDER — ONDANSETRON 4 MG/1
4 TABLET, ORALLY DISINTEGRATING ORAL ONCE
Status: COMPLETED | OUTPATIENT
Start: 2021-09-12 | End: 2021-09-12

## 2021-09-12 RX ADMIN — ONDANSETRON 4 MG: 4 TABLET, ORALLY DISINTEGRATING ORAL at 20:19

## 2021-09-12 RX ADMIN — OXYCODONE 5 MG: 5 TABLET ORAL at 20:19

## 2021-09-12 ASSESSMENT — PAIN DESCRIPTION - LOCATION: LOCATION: ABDOMEN;FLANK

## 2021-09-12 ASSESSMENT — PAIN DESCRIPTION - DESCRIPTORS: DESCRIPTORS: SORE;SHARP;PRESSURE

## 2021-09-12 ASSESSMENT — ENCOUNTER SYMPTOMS
EYE DISCHARGE: 0
APNEA: 0
FACIAL SWELLING: 0
VOMITING: 0
CHOKING: 0
BACK PAIN: 0
EYE REDNESS: 0
ABDOMINAL PAIN: 0
NAUSEA: 0
SORE THROAT: 0
SHORTNESS OF BREATH: 0

## 2021-09-12 ASSESSMENT — PAIN DESCRIPTION - PAIN TYPE: TYPE: ACUTE PAIN

## 2021-09-12 ASSESSMENT — PAIN SCALES - GENERAL
PAINLEVEL_OUTOF10: 8
PAINLEVEL_OUTOF10: 8

## 2021-09-13 ENCOUNTER — TELEPHONE (OUTPATIENT)
Dept: FAMILY MEDICINE CLINIC | Age: 41
End: 2021-09-13

## 2021-09-13 NOTE — ED NOTES
Back from CT. Blankets given. Remains NPO while waiting for CT results. Still needs urine specimen. Pt has tried to give urine specimen-was unable to void. Lower pelvic pain at 4. Right flank pain at 4.      Silvia Vu RN  09/12/21 5137

## 2021-09-13 NOTE — ED PROVIDER NOTES
**ADVANCED PRACTICE PROVIDER, I HAVE EVALUATED THIS PATIENT**        [facility] ENCOUNTER      Pt Name: Danny TA:[id number]  [place] 1980  Date of evaluation: 9/12/2021  Provider: Angel Peraza PA-C      Chief Complaint:    Chief Complaint   Patient presents with    Flank Pain     bilat x1wk, with n/v, dysuria         Nursing Notes, Past Medical Hx, Past Surgical Hx, Social Hx, Allergies, and Family Hx were all reviewed and agreed with or any disagreements were addressed in the HPI.    HPI: (Location, Duration, Timing, Severity, Quality, Assoc Sx, Context, Modifying factors)  This is a  46 y.o. female who presents to the emergency room with complaint of right flank pain. Patient has history of kidney stone. She was recently seen at [facility].  She did call her urologist who told her to come back to emergency room. States he had a fever 102 at home. She has a history of hysterectomy. Denies gross hematuria. She takes tramadol for pain she was given tramadol 4 tablets by her primary care physician and Flomax. Initially the pain is in the upper right flank and now moved down towards the abdomen. She is ambulatory. Does not appear to be in acute distress. Does complain of some nausea. PastMedical/Surgical History:      Diagnosis Date    ADHD (attention deficit hyperactivity disorder) 82    Depression with anxiety     Diabetes mellitus ([facility] [address]     pre-diabetes    Difficult intubation     Encounter for imaging to screen for metal prior to MRI 06/01/2021    MRI Conditional Medtronic Non-Programmable shunt model#[id number] implanted 10/30/2020 at [facility]. Normal Mode. 1.5T or 3.0T.    ESBL (extended spectrum beta-lactamase) producing bacteria infection 11/06/2019    urine    Functional ovarian cysts 2008    rt ovary cyst x 2 yrs.     Headache(784.0)     migraines    History of blood transfusion     at birth   Adonis Loser History of kidney stones     History of PCOS     Hydrocephalus ([facility] [address]     Hyperlipidemia     Irritable bowel syndrome 2004    had colonoscopy about 6 yrs ago    Meningitis     Neutrophilic leukocytosis     Nicotine dependence     PONV (postoperative nausea and vomiting)     very nauseated and sometimes wakes up with a Migraine--happened once after brain surgery    Primary osteoarthritis of left knee 2016    S/P cone biopsy of cervix 2004    Scoliosis 1990.s     (ventriculoperitoneal) shunt status     hydrocephalus f/w Neurosurgeon at [facility]     (ventriculoperitoneal) shunt status     Wears glasses     reading         Procedure Laterality Date    ABDOMEN SURGERY N/A 2021    REMOVAL OF ABDOMINAL WALL MASS performed by Juan José Carrillo MD at [facility]      appendicitis     SECTION      placenta previa    CHOLECYSTECTOMY      COLONOSCOPY  2004    COLPOSCOPY      CSF SHUNT      replaced at age 17   Riana Solum CYSTOSCOPY      stone removal    HEMORRHOID SURGERY      HERNIA REPAIR Bilateral     inguinal    HERNIA REPAIR      hiatal hernia    HYSTERECTOMY, TOTAL ABDOMINAL  2016    TAHBSO, adhesions/PCOS    KNEE ARTHROSCOPY Left 2015    medial meniscectomy, chondroplasty, plica resection    LITHOTRIPSY Bilateral 12/10/2019    OTHER SURGICAL HISTORY  10/19/2016    op lap    VENTRICULOPERITONEAL SHUNT      multiple revisions, most recent        Medications:  Previous Medications    ALPRAZOLAM (XANAX) 0.5 MG TABLET    Take 0.5 mg by mouth daily as needed. ERYTHROMYCIN (ROMYCIN) 5 MG/GM OPHTHALMIC OINTMENT    Apply 1cm ribbon to right lower lid every 4 hours for 7 days. LISDEXAMFETAMINE (VYVANSE) 30 MG CAPSULE    Take 30 mg by mouth every morning. PROMETHAZINE (PHENERGAN) 25 MG TABLET    Take 1 tablet by mouth every 6 hours as needed for Nausea WARNING:  May cause drowsiness.   May impair ability to operate vehicles or machinery. Do not use in combination with alcohol. SIMVASTATIN (ZOCOR) 10 MG TABLET    Take 1 tablet by mouth nightly    TAMSULOSIN (FLOMAX) 0.4 MG CAPSULE    Take 1 capsule by mouth daily    TRAMADOL (ULTRAM) 50 MG TABLET    Take 1 tablet by mouth daily as needed for Pain for up to 4 days. Intended supply: 3 days. Take lowest dose possible to manage pain         Review of Systems:  (2-9 systems needed)  Review of Systems   Constitutional: Negative for chills and fever. HENT: Negative for congestion, facial swelling and sore throat. Eyes: Negative for discharge and redness. Respiratory: Negative for apnea, choking and shortness of breath. Cardiovascular: Negative for chest pain. Gastrointestinal: Negative for abdominal pain, nausea and vomiting. Genitourinary: Positive for flank pain. Negative for dysuria. Musculoskeletal: Negative for back pain, neck pain and neck stiffness. Neurological: Negative for dizziness, tremors, seizures, weakness and headaches. All other systems reviewed and are negative. \"Positives and Pertinent negatives as per HPI\"    Physical Exam:  Physical Exam  Vitals and nursing note reviewed. Cardiovascular:      Rate and Rhythm: Normal rate and regular rhythm. Heart sounds: Normal heart sounds. No murmur heard. No friction rub. No gallop. Pulmonary:      Effort: Pulmonary effort is normal. No respiratory distress. Breath sounds: Normal breath sounds. No wheezing or rales. Chest:      Chest wall: No tenderness. Abdominal:      General: Abdomen is flat. Bowel sounds are normal. There is no distension. Palpations: There is no mass. Tenderness: There is abdominal tenderness. There is no guarding or rebound.        Musculoskeletal:        Back:          MEDICAL DECISION MAKING    Vitals:    Vitals:    09/12/21 1932 09/12/21 2125   BP: (!) 155/81 (!) 135/93   Pulse: 111 82   Resp: 20 20   Temp: 98.8 °F (37.1 °C)    TempSrc: Oral    SpO2: 96% 98%   Weight: 160 lb 7.9 oz (72.8 kg)    Height: 4' 11\" (1.499 m)        LABS:  Labs Reviewed   URINE RT REFLEX TO CULTURE    Narrative:     Performed at:  [facility]  [address] [place], [facility]   Phone [phone] of labs reviewed and were negative at this time or not returned at the time of this note. RADIOLOGY:   Non-plain film images such as CT, Ultrasound and MRI are read by the radiologist. Cheng Brown PA-C have directly visualized the radiologic plain film image(s) with the below findings:      Interpretation per the Radiologist below, if available at the time of this note:    CT ABDOMEN PELVIS WO CONTRAST Additional Contrast? None   Final Result   No acute intra-abdominal or pelvic abnormality. 3 mm nonobstructing right renal calculus              CT HEAD WO CONTRAST    Result Date: 8/17/2021  EXAMINATION: CT OF THE HEAD WITHOUT CONTRAST  8/17/2021 5:01 pm TECHNIQUE: CT of the head was performed without the administration of intravenous contrast. Dose modulation, iterative reconstruction, and/or weight based adjustment of the mA/kV was utilized to reduce the radiation dose to as low as reasonably achievable. COMPARISON: 08/04/2021 HISTORY: ORDERING SYSTEM PROVIDED HISTORY: r/o hydrocephalus TECHNOLOGIST PROVIDED HISTORY: If patient is on cardiac monitor and/or pulse ox, they may be taken off cardiac monitor and pulse ox, left on O2 if currently on. All monitors reattached when patient returns to room. Has a \"code stroke\" or \"stroke alert\" been called? ->No Reason for exam:->r/o hydrocephalus Decision Support Exception - unselect if not a suspected or confirmed emergency medical condition->Emergency Medical Condition (MA) Is the patient pregnant?->No Reason for Exam: PT. C/O RADIATING HA LT.  SIDE HEAD WHERE THE SHUNT IS THAT COMES DOWN TO ABD WITH ABD PAIN MID LT. ABD WITH N/V STATES FOR A WHILE Acuity: Acute Type of Exam: Initial Relevant Medical/Surgical History: HX HA, HX CSF SHUNT, HX  SHUNT STATUS, HX HYDROCEPHALUS FINDINGS: BRAIN/VENTRICLES: Ventricles remain midline in position and unchanged in appearance. . Tip of  shunt catheter projects in the region of the 3rd ventricle, similar to prior. No hydrocephalus noted. No hemorrhage or mass seen. Cerebellar tonsils are low lying. ORBITS: The visualized portion of the orbits demonstrate no acute abnormality. SINUSES: No significant mastoid opacification noted. There is bowing and spurring of the nasal septum. No air-fluid level seen in the sinuses. SOFT TISSUES/SKULL:  There is evidence of prior aileen hole placement on the right.  shunt catheter seen on the left. Stable exam.   shunt catheter appears unchanged. No hydrocephalus noted. CT ABDOMEN PELVIS W IV CONTRAST Additional Contrast? None    Result Date: 8/31/2021  EXAMINATION: CT OF THE ABDOMEN AND PELVIS WITH CONTRAST 8/31/2021 5:44 pm TECHNIQUE: CT of the abdomen and pelvis was performed with the administration of intravenous contrast. Multiplanar reformatted images are provided for review. Dose modulation, iterative reconstruction, and/or weight based adjustment of the mA/kV was utilized to reduce the radiation dose to as low as reasonably achievable. COMPARISON: CT of the abdomen and pelvis of 8/17/2021 HISTORY: ORDERING SYSTEM PROVIDED HISTORY: Lower abdominal pain after biopsy TECHNOLOGIST PROVIDED HISTORY: Additional Contrast?->None Reason for exam:->Lower abdominal pain after biopsy Decision Support Exception - unselect if not a suspected or confirmed emergency medical condition->Emergency Medical Condition (MA) Reason for Exam: Lower abdominal pain after biopsy Acuity: Acute Type of Exam: Initial FINDINGS: Lower Chest:  Visualized portion of the lower chest demonstrates no acute abnormality. Organs: Liver is normal in size and CT attenuation.  Gallbladder is surgically absent Spleen, adrenals, and pancreas are unremarkable. Expected postsurgical biliary ductal dilation is unchanged. Kidneys enhance symmetrically. No hydroureteronephrosis. GI/Bowel: No bowel obstruction. Appendix is surgically absent. Pelvis: Bladder is unremarkable. Status post hysterectomy. No adnexal mass. Peritoneum/Retroperitoneum: Abdominal aorta is normal in course and caliber. Portal vein is patent. No lymphadenopathy. No ascites or free intraperitoneal air. Ventriculoperitoneal shunt catheter remains in place. There is a trace volume of fluid near the catheter tip, but no loculated fluid collections identified. Bones/Soft Tissues: Metallic density along the left rectus abdominus muscle in the upper abdomen is unchanged. Postsurgical changes in the ventral abdominal wall. Similar appearing area of mild soft tissue stranding and skin thickening along the lower ventral abdominal wall. No acute osseous abnormality. No suspicious osseous lesion. No acute CT abnormality within the abdomen or pelvis. CT ABDOMEN PELVIS W IV CONTRAST Additional Contrast? None    Result Date: 8/17/2021  EXAMINATION: CT OF THE ABDOMEN AND PELVIS WITH CONTRAST 8/17/2021 4:46 pm TECHNIQUE: CT of the abdomen and pelvis was performed with the administration of intravenous contrast. Multiplanar reformatted images are provided for review. Dose modulation, iterative reconstruction, and/or weight based adjustment of the mA/kV was utilized to reduce the radiation dose to as low as reasonably achievable. COMPARISON: CT abdomen pelvis 07/28/2021. HISTORY: ORDERING SYSTEM PROVIDED HISTORY: abd pain/possible  shunt issue TECHNOLOGIST PROVIDED HISTORY: Reason for exam:->abd pain/possible  shunt issue Additional Contrast?->None Decision Support Exception - unselect if not a suspected or confirmed emergency medical condition->Emergency Medical Condition (MA) Reason for Exam: PT. C/O RADIATNG PAIN FROM LT. SIDE HEAD WHERE SHUNT IS DOWN TO  MID LT.  ABD WHICH HAS REDNESS STATES FOR A LONG TIME WITH N/V, DENIES DIARRHEA Acuity: Acute Type of Exam: Initial Relevant Medical/Surgical History: HX FUNCTIONAL OVARIAN CYSTS, HX S/P CONE BIOPSY OF CERVIX, HX IBS, HX KIDNEY STONE, HX APPENDECTOMY, HX COLONOSCOPY, HX COLPOSCOPY, HX , HX CYSTOSCOPY, HX HYSTERECTOMY, HX LITHOTRIPSY, HX HERNIA REPAIR, HX HEMORRHOIDS, HX ABD SURGERY- REMOVAL ABD WALL MASS FINDINGS: Lower Chest: Lung bases demonstrate no focal consolidation or pleural effusion. Organs: Liver: Unremarkable on this phase of enhancement. Spleen: Unremarkable on this phase of enhancement. Pancreas: Unremarkable on this phase of enhancement. Adrenal glands: Unremarkable on this phase of enhancement. Kidneys: Unremarkable on this phase of enhancement. Nonobstructive right renal calculus. No left renal calculus. No ureteral or bladder calculus. No hydronephrosis. Gallbladder: Surgically absent. Prominent extrahepatic biliary system appears similar to prior exam and likely represents chronic changes. GI/Bowel: Evaluation of the bowel and mesentery is limited due to lack of enteric contrast. Visualized esophagus/stomach: Unremarkable given lack of enteric contrast and degree of distension. Small bowel: No dilated small bowel. Large bowel: Scattered colonic diverticula without adjacent stranding. Appendix: Appendix or appendiceal stump appears unremarkable. Pelvis: Bladder is incompletely distended. Uterus is not visualized. Ovaries are not visualized. Distal end of ventriculoperitoneal shunt catheter noted. Tip terminates in the mid-pelvis. Peritoneum/Retroperitoneum: Adenopathy: Suboptimal evaluation for adenopathy due to lack of enteric contrast. Abdominal aorta: No abdominal aortic aneurysm. Free fluid/air: No free fluid. No free air. Bones/Soft Tissues: Body wall appears unremarkable. 1. No diverticulitis or small bowel obstruction.  2. No fluid or fluid collection adjacent to the ventriculoperitoneal LISDEXAMFETAMINE (VYVANSE) 50 MG CAPSULE    Take 30 mg by mouth every morning.     TERBINAFINE (LAMISIL) 250 MG TABLET    Take 1 tablet by mouth daily              (Please note the MDM and HPI sections of this note were completed with a voice recognition program.  Efforts were made to edit the dictations but occasionally words are mis-transcribed.)    Electronically signed, Myriam Lama PA-C,          Myriam Lama PA-C  09/12/21 [id number]

## 2021-09-13 NOTE — ED NOTES
MICHELLE to bedside discussing test results. Pt calling her family to pick her up. Report to Giovany Pretty RN who will be discharging pt home. Appreciative of care.      Jose Bates RN  09/12/21 1946

## 2021-09-13 NOTE — ED NOTES
Hx of kidney stones,.lower pelvic pain now at 8. Right flank pain at 4. Took tramadol and aleve at 1630. Reports fever 99.8, 100.5, 101.7 today. Called her [facility] urologist and was told to go to the ED if she needed to. Seen at [id number] [facility] ED on 9/9/21 and diagnosed with kidney stones-prescribed tramadol and flomax. Denies dysuria today. Vomited 3 times today.      Jacquelyn Capone RN  09/12/21 May Hurst, RN  09/12/21 2003       Jacquelyn Capone RN  09/12/21 2111

## 2021-09-13 NOTE — ED NOTES
Drinking lots of water/1 cup of fang mist in order to give urine spec (ok per PA-C). Tolerating well.  No N/V     Luzmaria Maradiaga RN  09/12/21 7226

## 2021-09-14 NOTE — TELEPHONE ENCOUNTER
PT CALLED AND STATED THAT SHE IS RUNNING A 102 FEVER AND WAS EXPOSED TO COVID. WHEN I CALLED DPT BACK AND SPOKE WITH HER SHE WAS EXPOSED TO SOMEONE WHO WAS IN HER HOME AND THEY HAS TAKEN AN AT HOME TEST AND THAT IT WAS POSITIVE. PT IS ONLY HAVING THE FEVER AND THE SOME CONGESTION. TOLD PT TO WAIT 3-5 DAYS BEFORE GETTING TESTING. RECOMMEND TO TAKE AN ALLERGY MEDICATION, DECONGESTION, ALTERNATE BETWEEN IBU AND TYOLONE AND GARGLE WITH SALT WATER.  HS

## 2021-09-17 ENCOUNTER — HOSPITAL ENCOUNTER (EMERGENCY)
Age: 41
Discharge: HOME | End: 2021-09-17
Payer: COMMERCIAL

## 2021-09-17 VITALS
DIASTOLIC BLOOD PRESSURE: 87 MMHG | BODY MASS INDEX: 32.49 KG/M2 | SYSTOLIC BLOOD PRESSURE: 120 MMHG | TEMPERATURE: 99 F | HEIGHT: 59 IN | HEART RATE: 87 BPM | OXYGEN SATURATION: 100 % | RESPIRATION RATE: 24 BRPM | WEIGHT: 161.16 LBS

## 2021-09-17 DIAGNOSIS — R10.9 RIGHT SIDED ABDOMINAL PAIN: Primary | ICD-10-CM

## 2021-09-17 LAB
A/G RATIO: 1.3 (ref 1.1–2.2)
ALBUMIN SERPL-MCNC: 4.3 G/DL (ref 3.4–5)
ALP BLD-CCNC: 108 U/L (ref 40–129)
ALT SERPL-CCNC: 15 U/L (ref 10–40)
ANION GAP SERPL CALCULATED.3IONS-SCNC: 14 MMOL/L (ref 3–16)
AST SERPL-CCNC: 13 U/L (ref 15–37)
BASOPHILS ABSOLUTE: 0.1 K/UL (ref 0–0.2)
BASOPHILS RELATIVE PERCENT: 0.7 %
BILIRUB SERPL-MCNC: <0.2 MG/DL (ref 0–1)
BILIRUBIN URINE: NEGATIVE
BLOOD, URINE: NEGATIVE
BUN BLDV-MCNC: 9 MG/DL (ref 7–20)
CALCIUM SERPL-MCNC: 9.3 MG/DL (ref 8.3–10.6)
CHLORIDE BLD-SCNC: 104 MMOL/L (ref 99–110)
CLARITY: CLEAR
CO2: 23 MMOL/L (ref 21–32)
COLOR: YELLOW
CREAT SERPL-MCNC: 0.7 MG/DL (ref 0.6–1.1)
EOSINOPHILS ABSOLUTE: 0.2 K/UL (ref 0–0.6)
EOSINOPHILS RELATIVE PERCENT: 2 %
GFR AFRICAN AMERICAN: >60
GFR NON-AFRICAN AMERICAN: >60
GLOBULIN: 3.3 G/DL
GLUCOSE BLD-MCNC: 116 MG/DL (ref 70–99)
GLUCOSE URINE: NEGATIVE MG/DL
HCT VFR BLD CALC: 45 % (ref 36–48)
HEMOGLOBIN: 15.2 G/DL (ref 12–16)
KETONES, URINE: NEGATIVE MG/DL
LACTIC ACID: 1.1 MMOL/L (ref 0.4–2)
LEUKOCYTE ESTERASE, URINE: NEGATIVE
LIPASE: 19 U/L (ref 13–60)
LYMPHOCYTES ABSOLUTE: 2.7 K/UL (ref 1–5.1)
LYMPHOCYTES RELATIVE PERCENT: 34.9 %
MAGNESIUM: 2.1 MG/DL (ref 1.8–2.4)
MCH RBC QN AUTO: 29.2 PG (ref 26–34)
MCHC RBC AUTO-ENTMCNC: 33.7 G/DL (ref 31–36)
MCV RBC AUTO: 86.5 FL (ref 80–100)
MICROSCOPIC EXAMINATION: NORMAL
MONOCYTES ABSOLUTE: 0.5 K/UL (ref 0–1.3)
MONOCYTES RELATIVE PERCENT: 6.8 %
NEUTROPHILS ABSOLUTE: 4.3 K/UL (ref 1.7–7.7)
NEUTROPHILS RELATIVE PERCENT: 55.6 %
NITRITE, URINE: NEGATIVE
PDW BLD-RTO: 13.4 % (ref 12.4–15.4)
PH UA: 6.5 (ref 5–8)
PLATELET # BLD: 269 K/UL (ref 135–450)
PMV BLD AUTO: 7.8 FL (ref 5–10.5)
POTASSIUM REFLEX MAGNESIUM: 3.5 MMOL/L (ref 3.5–5.1)
PROCALCITONIN: 0.04 NG/ML (ref 0–0.15)
PROTEIN UA: NEGATIVE MG/DL
RBC # BLD: 5.2 M/UL (ref 4–5.2)
SODIUM BLD-SCNC: 141 MMOL/L (ref 136–145)
SPECIFIC GRAVITY UA: 1.01 (ref 1–1.03)
TOTAL PROTEIN: 7.6 G/DL (ref 6.4–8.2)
TROPONIN: <0.01 NG/ML
URINE REFLEX TO CULTURE: NORMAL
URINE TYPE: NORMAL
UROBILINOGEN, URINE: 0.2 E.U./DL
WBC # BLD: 7.7 K/UL (ref 4–11)

## 2021-09-17 PROCEDURE — 6370000000 HC RX 637 (ALT 250 FOR IP): Performed by: NURSE PRACTITIONER

## 2021-09-17 PROCEDURE — 81003 URINALYSIS AUTO W/O SCOPE: CPT

## 2021-09-17 PROCEDURE — 84484 ASSAY OF TROPONIN QUANT: CPT

## 2021-09-17 PROCEDURE — 80053 COMPREHEN METABOLIC PANEL: CPT

## 2021-09-17 PROCEDURE — 6360000002 HC RX W HCPCS: Performed by: NURSE PRACTITIONER

## 2021-09-17 PROCEDURE — 83605 ASSAY OF LACTIC ACID: CPT

## 2021-09-17 PROCEDURE — 84145 PROCALCITONIN (PCT): CPT

## 2021-09-17 PROCEDURE — 83735 ASSAY OF MAGNESIUM: CPT

## 2021-09-17 PROCEDURE — 85025 COMPLETE CBC W/AUTO DIFF WBC: CPT

## 2021-09-17 PROCEDURE — 99283 EMERGENCY DEPT VISIT LOW MDM: CPT

## 2021-09-17 PROCEDURE — 83690 ASSAY OF LIPASE: CPT

## 2021-09-17 PROCEDURE — 36415 COLL VENOUS BLD VENIPUNCTURE: CPT

## 2021-09-17 PROCEDURE — 96374 THER/PROPH/DIAG INJ IV PUSH: CPT

## 2021-09-17 RX ORDER — ONDANSETRON 4 MG/1
4-8 TABLET, ORALLY DISINTEGRATING ORAL EVERY 12 HOURS PRN
Qty: 20 TABLET | Refills: 0 | Status: ON HOLD
Start: 2021-09-17 | End: 2021-10-13 | Stop reason: HOSPADM

## 2021-09-17 RX ORDER — ONDANSETRON 2 MG/ML
4 INJECTION INTRAMUSCULAR; INTRAVENOUS ONCE
Status: COMPLETED | OUTPATIENT
Start: 2021-09-17 | End: 2021-09-17

## 2021-09-17 RX ORDER — OXYCODONE HYDROCHLORIDE AND ACETAMINOPHEN 5; 325 MG/1; MG/1
1 TABLET ORAL ONCE
Status: COMPLETED | OUTPATIENT
Start: 2021-09-17 | End: 2021-09-17

## 2021-09-17 RX ORDER — HYOSCYAMINE SULFATE 0.12 MG/1
0.12 TABLET SUBLINGUAL EVERY 4 HOURS PRN
Qty: 30 EACH | Refills: 1 | Status: SHIPPED | OUTPATIENT
Start: 2021-09-17 | End: 2021-10-26

## 2021-09-17 RX ADMIN — ONDANSETRON 4 MG: 2 INJECTION INTRAMUSCULAR; INTRAVENOUS at 22:10

## 2021-09-17 RX ADMIN — OXYCODONE HYDROCHLORIDE AND ACETAMINOPHEN 1 TABLET: 5; 325 TABLET ORAL at 22:50

## 2021-09-17 RX ADMIN — HYOSCYAMINE SULFATE 125 MCG: 0.12 TABLET ORAL; SUBLINGUAL at 22:34

## 2021-09-17 ASSESSMENT — ENCOUNTER SYMPTOMS
BLOOD IN STOOL: 0
ABDOMINAL PAIN: 1
VOMITING: 1
SHORTNESS OF BREATH: 0
BACK PAIN: 0
CONSTIPATION: 0
NAUSEA: 1
DIARRHEA: 0
ABDOMINAL DISTENTION: 1
COUGH: 0
COLOR CHANGE: 0

## 2021-09-17 ASSESSMENT — PAIN SCALES - GENERAL
PAINLEVEL_OUTOF10: 8
PAINLEVEL_OUTOF10: 8

## 2021-09-18 ENCOUNTER — TELEPHONE (OUTPATIENT)
Dept: PRIMARY CARE CLINIC | Age: 41
End: 2021-09-18

## 2021-09-18 ENCOUNTER — APPOINTMENT (OUTPATIENT)
Dept: CT IMAGING | Age: 41
End: 2021-09-18
Payer: COMMERCIAL

## 2021-09-18 ENCOUNTER — HOSPITAL ENCOUNTER (EMERGENCY)
Age: 41
Discharge: HOME | End: 2021-09-18
Attending: EMERGENCY MEDICINE
Payer: COMMERCIAL

## 2021-09-18 VITALS
HEIGHT: 59 IN | RESPIRATION RATE: 16 BRPM | SYSTOLIC BLOOD PRESSURE: 120 MMHG | WEIGHT: 150 LBS | BODY MASS INDEX: 30.24 KG/M2 | DIASTOLIC BLOOD PRESSURE: 95 MMHG | HEART RATE: 78 BPM | TEMPERATURE: 99.2 F | OXYGEN SATURATION: 100 %

## 2021-09-18 DIAGNOSIS — R10.9 RIGHT SIDED ABDOMINAL PAIN: Primary | ICD-10-CM

## 2021-09-18 LAB
ALBUMIN SERPL-MCNC: 4.5 G/DL (ref 3.4–5)
ALP BLD-CCNC: 108 U/L (ref 40–129)
ALT SERPL-CCNC: 19 U/L (ref 10–40)
ANION GAP SERPL CALCULATED.3IONS-SCNC: 16 MMOL/L (ref 3–16)
AST SERPL-CCNC: 21 U/L (ref 15–37)
BASE EXCESS VENOUS: 2.2 MMOL/L (ref -2–3)
BASOPHILS ABSOLUTE: 0.1 K/UL (ref 0–0.2)
BASOPHILS RELATIVE PERCENT: 0.9 %
BILIRUB SERPL-MCNC: 0.4 MG/DL (ref 0–1)
BILIRUBIN DIRECT: <0.2 MG/DL (ref 0–0.3)
BILIRUBIN URINE: NEGATIVE
BILIRUBIN, INDIRECT: NORMAL MG/DL (ref 0–1)
BLOOD, URINE: NEGATIVE
BUN BLDV-MCNC: 9 MG/DL (ref 7–20)
CALCIUM SERPL-MCNC: 9.7 MG/DL (ref 8.3–10.6)
CARBOXYHEMOGLOBIN: 5.1 % (ref 0–1.5)
CHLORIDE BLD-SCNC: 102 MMOL/L (ref 99–110)
CLARITY: CLEAR
CO2: 22 MMOL/L (ref 21–32)
COLOR: YELLOW
CREAT SERPL-MCNC: 0.7 MG/DL (ref 0.6–1.1)
EOSINOPHILS ABSOLUTE: 0.1 K/UL (ref 0–0.6)
EOSINOPHILS RELATIVE PERCENT: 1.3 %
GFR AFRICAN AMERICAN: >60
GFR NON-AFRICAN AMERICAN: >60
GLUCOSE BLD-MCNC: 102 MG/DL (ref 70–99)
GLUCOSE URINE: NEGATIVE MG/DL
HCO3 VENOUS: 27.3 MMOL/L (ref 24–28)
HCT VFR BLD CALC: 44.6 % (ref 36–48)
HEMOGLOBIN, VEN, REDUCED: 2.7 %
HEMOGLOBIN: 15.2 G/DL (ref 12–16)
KETONES, URINE: NEGATIVE MG/DL
LACTIC ACID: 1.3 MMOL/L (ref 0.4–2)
LEUKOCYTE ESTERASE, URINE: NEGATIVE
LIPASE: 15 U/L (ref 13–60)
LYMPHOCYTES ABSOLUTE: 2.7 K/UL (ref 1–5.1)
LYMPHOCYTES RELATIVE PERCENT: 27.5 %
MCH RBC QN AUTO: 29.4 PG (ref 26–34)
MCHC RBC AUTO-ENTMCNC: 34.1 G/DL (ref 31–36)
MCV RBC AUTO: 86.1 FL (ref 80–100)
METHEMOGLOBIN VENOUS: 0.2 % (ref 0–1.5)
MICROSCOPIC EXAMINATION: NORMAL
MONOCYTES ABSOLUTE: 0.5 K/UL (ref 0–1.3)
MONOCYTES RELATIVE PERCENT: 4.6 %
NEUTROPHILS ABSOLUTE: 6.4 K/UL (ref 1.7–7.7)
NEUTROPHILS RELATIVE PERCENT: 65.7 %
NITRITE, URINE: NEGATIVE
O2 SAT, VEN: 97 %
PCO2, VEN: 42.8 MMHG (ref 41–51)
PDW BLD-RTO: 13.3 % (ref 12.4–15.4)
PH UA: 6.5 (ref 5–8)
PH VENOUS: 7.41 (ref 7.35–7.45)
PLATELET # BLD: 311 K/UL (ref 135–450)
PMV BLD AUTO: 7.9 FL (ref 5–10.5)
PO2, VEN: 80.9 MMHG (ref 25–40)
POTASSIUM REFLEX MAGNESIUM: 4.6 MMOL/L (ref 3.5–5.1)
PROTEIN UA: NEGATIVE MG/DL
RBC # BLD: 5.18 M/UL (ref 4–5.2)
SODIUM BLD-SCNC: 140 MMOL/L (ref 136–145)
SPECIFIC GRAVITY UA: <=1.005 (ref 1–1.03)
TCO2 CALC VENOUS: 29 MMOL/L
TOTAL PROTEIN: 8.1 G/DL (ref 6.4–8.2)
URINE REFLEX TO CULTURE: NORMAL
URINE TYPE: NORMAL
UROBILINOGEN, URINE: 0.2 E.U./DL
WBC # BLD: 9.8 K/UL (ref 4–11)

## 2021-09-18 PROCEDURE — 96376 TX/PRO/DX INJ SAME DRUG ADON: CPT

## 2021-09-18 PROCEDURE — 2580000003 HC RX 258: Performed by: EMERGENCY MEDICINE

## 2021-09-18 PROCEDURE — 82803 BLOOD GASES ANY COMBINATION: CPT

## 2021-09-18 PROCEDURE — 81003 URINALYSIS AUTO W/O SCOPE: CPT

## 2021-09-18 PROCEDURE — 99283 EMERGENCY DEPT VISIT LOW MDM: CPT

## 2021-09-18 PROCEDURE — 96375 TX/PRO/DX INJ NEW DRUG ADDON: CPT

## 2021-09-18 PROCEDURE — 85025 COMPLETE CBC W/AUTO DIFF WBC: CPT

## 2021-09-18 PROCEDURE — 70450 CT HEAD/BRAIN W/O DYE: CPT

## 2021-09-18 PROCEDURE — 80076 HEPATIC FUNCTION PANEL: CPT

## 2021-09-18 PROCEDURE — 96374 THER/PROPH/DIAG INJ IV PUSH: CPT

## 2021-09-18 PROCEDURE — 83690 ASSAY OF LIPASE: CPT

## 2021-09-18 PROCEDURE — 83605 ASSAY OF LACTIC ACID: CPT

## 2021-09-18 PROCEDURE — 6360000002 HC RX W HCPCS: Performed by: EMERGENCY MEDICINE

## 2021-09-18 PROCEDURE — 80048 BASIC METABOLIC PNL TOTAL CA: CPT

## 2021-09-18 RX ORDER — PROMETHAZINE HYDROCHLORIDE 25 MG/1
25 TABLET ORAL 3 TIMES DAILY PRN
Qty: 12 TABLET | Refills: 0 | Status: SHIPPED | OUTPATIENT
Start: 2021-09-18 | End: 2021-09-25

## 2021-09-18 RX ORDER — MORPHINE SULFATE 4 MG/ML
4 INJECTION, SOLUTION INTRAMUSCULAR; INTRAVENOUS ONCE
Status: COMPLETED | OUTPATIENT
Start: 2021-09-18 | End: 2021-09-18

## 2021-09-18 RX ORDER — OXYCODONE HYDROCHLORIDE AND ACETAMINOPHEN 5; 325 MG/1; MG/1
1 TABLET ORAL EVERY 6 HOURS PRN
Qty: 12 TABLET | Refills: 0 | Status: SHIPPED | OUTPATIENT
Start: 2021-09-18 | End: 2021-09-21

## 2021-09-18 RX ORDER — SODIUM CHLORIDE, SODIUM LACTATE, POTASSIUM CHLORIDE, AND CALCIUM CHLORIDE .6; .31; .03; .02 G/100ML; G/100ML; G/100ML; G/100ML
1000 INJECTION, SOLUTION INTRAVENOUS ONCE
Status: COMPLETED | OUTPATIENT
Start: 2021-09-18 | End: 2021-09-18

## 2021-09-18 RX ORDER — PROMETHAZINE HYDROCHLORIDE 25 MG/ML
12.5 INJECTION, SOLUTION INTRAMUSCULAR; INTRAVENOUS ONCE
Status: COMPLETED | OUTPATIENT
Start: 2021-09-18 | End: 2021-09-18

## 2021-09-18 RX ADMIN — PROMETHAZINE HYDROCHLORIDE 12.5 MG: 25 INJECTION INTRAMUSCULAR; INTRAVENOUS at 19:55

## 2021-09-18 RX ADMIN — SODIUM CHLORIDE, POTASSIUM CHLORIDE, SODIUM LACTATE AND CALCIUM CHLORIDE 1000 ML: 600; 310; 30; 20 INJECTION, SOLUTION INTRAVENOUS at 16:52

## 2021-09-18 RX ADMIN — PROMETHAZINE HYDROCHLORIDE 12.5 MG: 25 INJECTION INTRAMUSCULAR; INTRAVENOUS at 16:43

## 2021-09-18 RX ADMIN — MORPHINE SULFATE 4 MG: 4 INJECTION INTRAVENOUS at 19:55

## 2021-09-18 RX ADMIN — MORPHINE SULFATE 4 MG: 4 INJECTION INTRAVENOUS at 16:43

## 2021-09-18 ASSESSMENT — ENCOUNTER SYMPTOMS
COUGH: 0
EYES NEGATIVE: 1
SHORTNESS OF BREATH: 0
VOMITING: 1
SORE THROAT: 0
RESPIRATORY NEGATIVE: 1
ABDOMINAL PAIN: 1
NAUSEA: 1

## 2021-09-18 ASSESSMENT — PAIN DESCRIPTION - PAIN TYPE: TYPE: ACUTE PAIN

## 2021-09-18 ASSESSMENT — PAIN SCALES - GENERAL
PAINLEVEL_OUTOF10: 8
PAINLEVEL_OUTOF10: 10
PAINLEVEL_OUTOF10: 10

## 2021-09-18 ASSESSMENT — PAIN DESCRIPTION - LOCATION: LOCATION: ABDOMEN

## 2021-09-18 ASSESSMENT — PAIN DESCRIPTION - ORIENTATION: ORIENTATION: RIGHT

## 2021-09-18 NOTE — ED NOTES
Patient requesting pain medication. She was given zofran, and patient states \"It's not going to help, I am nauseous because of the pain. \" Spoke with Gilford Rivet NP, orders placed for hycosymine.       Jia Horne RN  09/17/21 6479

## 2021-09-18 NOTE — TELEPHONE ENCOUNTER
shunt since birth, several revisions, and infections. - 10/2020 h/o abdominal malfunction/infection   last CT 9/12/21  No acute intra-abdominal or pelvic abnormality.       3 mm nonobstructing right renal calculus       Continues to C/o Right flank pain, abdominal swelling, headache, fever, not sleeping, eating well. Was sent to ED yesterday by [facility] neuro surgeon, Daniela Bhakta  she has also left [facility] [place] him post ED visit but reports has not received return call. Per pt ED provider stated its not her shut after reviewing blood work and sent her home. Pt in tears, feels everyone is not taking her serious and or thinking she is wanting pain medication, asking what to do, Advised to return to ED. Pt plans to have her sister drive her to [address] spoke with Greg Guevara at [facility], report given.      Will forward note to PCP

## 2021-09-18 NOTE — ED PROVIDER NOTES
[address] [facility]          ATTENDING PHYSICIAN NOTE       Date of evaluation: 9/18/2021    Chief Complaint     Abdominal Pain (hx of  shunt. Pt c/o abdominal pain to here right side where the  drains into states her head is hurting where the  shunt is on left left side. states she has had fevers hs of shunt malfuntions. Pt was at [facility] last night. ) and Headache      History of Present Illness     Troy Peterson is a 46 y.o. female who presents with complaints of persistent/recurrent abdominal pain, as well as headache. The patient has a complex past medical history that includes  shunt present since childhood, now followed by Dr. Shonna Man of [facility], has numerous other medical and surgical comorbidities, including a prior history of appendectomy, cholecystectomy, and prior hernia repairs. Most recently, in July 2021, she had surgical resection of a painful abdominal wall mass, which was felt at the time to potentially represent fat necrosis. This resection was thereafter complicated with local cellulitis, but overall the patient has had fairly persistent abdominal pain since that time. She has unfortunately required multiple ED visits in various hospital systems for continued abdominal pain, and has had 5 CT scans beginning in July, most recently about 1 week ago, on September 12. No etiology for her abdominal pain has clearly been discovered. She was most recently seen at [facility] emergency department last evening with primarily right-sided abdominal pain. Her work-up at that time nonrevealing. She was discharged with a prescription for Levsin, which she does not feel has been significantly helping.   Presents tonight with persistence of right-sided abdominal pain, which she rates 10 out of 10 in intensity, and describes as persistent, sharp, aching pain, with associated nausea and occasional vomiting, but without significant exacerbating or alleviating factors that she can discover. She strongly feels a sense that the right side of her abdomen is more swollen or protuberant than the left side. The patient has felt some subjective fevers and chills, and states that she had a low-grade fever using her temporal thermometer at home today. She also has newly developed a primarily left-sided headache, which she also states is 10 out of 10 in intensity, and which is not present previously. She has significant concerns that her symptoms today represent a complication of her longstanding  shunt, as the terminal end of the shunt is primarily in the right side of the abdomen, and she has had difficulties with infection and displacement of the shunt leading to abdominal pain in the past.    Review of Systems     Review of Systems   Constitutional: Positive for activity change, appetite change, chills, fatigue and fever. HENT: Negative. Negative for congestion, postnasal drip and sore throat. Eyes: Negative. Negative for visual disturbance. Respiratory: Negative. Negative for cough and shortness of breath. Cardiovascular: Negative. Negative for chest pain. Gastrointestinal: Positive for abdominal pain, nausea and vomiting. Genitourinary: Negative. Negative for difficulty urinating and dysuria. Musculoskeletal: Negative for neck pain and neck stiffness. Skin: Negative. Neurological: Positive for headaches. Negative for syncope. Psychiatric/Behavioral: Negative.         Past Medical, Surgical, Family, and Social History     She has a past medical history of ADHD (attention deficit hyperactivity disorder), Depression with anxiety, Diabetes mellitus ([address] Difficult intubation, Encounter for imaging to screen for metal prior to MRI, ESBL (extended spectrum beta-lactamase) producing bacteria infection, Functional ovarian cysts, Headache(784.0), History of blood transfusion, History of kidney stones, History of PCOS, Hydrocephalus ([address] morning. Historical Med      tamsulosin (FLOMAX) 0.4 MG capsule Take 1 capsule by mouth daily, Disp-30 capsule, R-0Normal      ALPRAZolam (XANAX) 0.5 MG tablet Take 0.5 mg by mouth daily as needed. Historical Med      simvastatin (ZOCOR) 10 MG tablet Take 1 tablet by mouth nightly, Disp-90 tablet, R-1Normal             Allergies     She is allergic to bee venom, bentyl [dicyclomine hcl], mushroom extract complex, sulfa antibiotics, vancomycin, adhesive tape, toradol [ketorolac tromethamine], acetaminophen, ceftaroline, daptomycin, dicyclomine, fioricet-codeine [butalbital-apap-caff-cod], levaquin [levofloxacin], methocarbamol, prochlorperazine, tazobactam, and zosyn [piperacillin sod-tazobactam so]. Physical Exam     INITIAL VITALS: BP: (!) 146/102, Temp: 99.2 °F (37.3 °C), Pulse: 105, Resp: 18, SpO2: 100 %     General: Well appearing. Very uncomfortable appearing, but pleasantly conversational, and in no acute respiratory distress. HEENT: Pupils are equal, round, and reactive to light. Extraocular muscles are intact. Conjunctivae are clear and moist. No redness or drainage from the eyes. No drainage from the nose. The oropharynx appeared to be normal.  On examination of the left side of the head, the  shunt site is noted, with the device palpable below the scalp. No overlying warmth or erythema, or edema. The tract of the shunt behind the left ear is palpable, without overlying warmth, erythema, or tenderness. Neck: Supple, with full range of motion. Cardiovascular: Normal S1-S2 without murmur rub or gallop. 2+ radial pulses bilaterally. Respiratory: Unlabored breathing with equal chest rise and fall. Lungs are clear to auscultation bilaterally. No adventitious lung sounds heard. Abdomen: Protuberant, but soft and nondistended. Tenderness to palpation that is primarily present in the right lateral aspect of the abdomen, without focal upper or lower quadrant tenderness to palpation.   No asymmetric edema or masses are noted. Skin: Warm and dry, without rashes or ecchymoses, lacerations or abrasions. Neuro: Alert and oriented x3. No focal neurologic deficits are noted. Extremities: Warm and well-perfused, without clubbing, cyanosis, or edema. The patient moves all extremities equally. Psych: The patient's mood and affect are generally within normal limits for their presentation. Diagnostic Results     RADIOLOGY:  CT HEAD WO CONTRAST   Final Result      No acute intracranial abnormality. Left frontal approach ventriculostomy shunt catheter in place with slitlike ventricles, unchanged.           LABS:   Results for orders placed or performed during the hospital encounter of 09/18/21   Urinalysis Reflex to Culture    Specimen: Urine, clean catch   Result Value Ref Range    Color, UA Yellow Straw/Yellow    Clarity, UA Clear Clear    Glucose, Ur Negative Negative mg/dL    Bilirubin Urine Negative Negative    Ketones, Urine Negative Negative mg/dL    Specific Gravity, UA <=1.005 1.005 - 1.030    Blood, Urine Negative Negative    pH, UA 6.5 5.0 - 8.0    Protein, UA Negative Negative mg/dL    Urobilinogen, Urine 0.2 <2.0 E.U./dL    Nitrite, Urine Negative Negative    Leukocyte Esterase, Urine Negative Negative    Microscopic Examination Not Indicated     Urine Type Voided     Urine Reflex to Culture Not Indicated    CBC auto differential   Result Value Ref Range    WBC 9.8 4.0 - 11.0 K/uL    RBC 5.18 4.00 - 5.20 M/uL    Hemoglobin 15.2 12.0 - 16.0 g/dL    Hematocrit 44.6 36.0 - 48.0 %    MCV 86.1 80.0 - 100.0 fL    MCH 29.4 26.0 - 34.0 pg    MCHC 34.1 31.0 - 36.0 g/dL    RDW 13.3 12.4 - 15.4 %    Platelets 805 873 - 203 K/uL    MPV 7.9 5.0 - 10.5 fL    Neutrophils % 65.7 %    Lymphocytes % 27.5 %    Monocytes % 4.6 %    Eosinophils % 1.3 %    Basophils % 0.9 %    Neutrophils Absolute 6.4 1.7 - 7.7 K/uL    Lymphocytes Absolute 2.7 1.0 - 5.1 K/uL    Monocytes Absolute 0.5 0.0 - 1.3 K/uL Eosinophils Absolute 0.1 0.0 - 0.6 K/uL    Basophils Absolute 0.1 0.0 - 0.2 K/uL   Blood gas, venous (Lab)   Result Value Ref Range    pH, [place] 7.413 7.350 - 7.450    pCO2, [place] 42.8 41.0 - 51.0 mmHg    pO2, [place] 80.9 (H) 25 - 40 mmHg    HCO3, Venous 27.3 24.0 - 28.0 mmol/L    Base Excess, [place] 2.2 -2.0 - 3.0 mmol/L    O2 Sat, [place] 97 Not established %    Carboxyhemoglobin 5.1 (H) 0.0 - 1.5 %    MetHgb, [place] 0.2 0.0 - 1.5 %    TC02 (Calc), [place] 29 mmol/L    Hemoglobin, [place], Reduced 2.70 %   Lactate, plasma   Result Value Ref Range    Lactic Acid 1.3 0.4 - 2.0 mmol/L   Basic Metabolic Panel w/ Reflex to MG   Result Value Ref Range    Sodium 140 136 - 145 mmol/L    Potassium reflex Magnesium 4.6 3.5 - 5.1 mmol/L    Chloride 102 99 - 110 mmol/L    CO2 22 21 - 32 mmol/L    Anion Gap 16 3 - 16    Glucose 102 (H) 70 - 99 mg/dL    BUN 9 7 - 20 mg/dL    CREATININE 0.7 0.6 - 1.1 mg/dL    GFR Non-African American >60 >60    GFR African American >60 >60    Calcium 9.7 8.3 - 10.6 mg/dL   Lipase   Result Value Ref Range    Lipase 15.0 13.0 - 60.0 U/L   Hepatic function panel (LFTs)   Result Value Ref Range    Total Protein 8.1 6.4 - 8.2 g/dL    Albumin 4.5 3.4 - 5.0 g/dL    Alkaline Phosphatase 108 40 - 129 U/L    ALT 19 10 - 40 U/L    AST 21 15 - 37 U/L    Total Bilirubin 0.4 0.0 - 1.0 mg/dL    Bilirubin, Direct <0.2 0.0 - 0.3 mg/dL    Bilirubin, Indirect see below 0.0 - 1.0 mg/dL         RECENT VITALS:  BP: (!) 120/95, Temp: 99.2 °F (37.3 °C), Pulse: 78, Resp: 16, SpO2: 100 %     Procedures       ED Course     Nursing Notes, Past Medical Hx, Past Surgical Hx, Social Hx, Allergies, and Family Hx were reviewed.     The patient was given the following medications:  Orders Placed This Encounter   Medications    lactated ringers bolus    morphine injection 4 mg    promethazine (PHENERGAN) injection 12.5 mg    morphine injection 4 mg    promethazine (PHENERGAN) injection 12.5 mg    oxyCODONE-acetaminophen (PERCOCET) 5-325 MG per tablet     Sig: Take 1 tablet by mouth every 6 hours as needed for Pain for up to 3 days. Intended supply: 3 days. Take lowest dose possible to manage pain     Dispense:  12 tablet     Refill:  0    promethazine (PHENERGAN) 25 MG tablet     Sig: Take 1 tablet by mouth 3 times daily as needed for Nausea     Dispense:  12 tablet     Refill:  0       CONSULTS:  Keisha Berrios / Bryan Quesada / Nicholas Dallin is a 46 y.o. female with a complicated past medical and surgical history as described above, including  shunt since childhood, as well as relatively longstanding waxing and waning abdominal pain, who presents to the emergency department today with worsening abdominal pain, with an unrevealing work-up at an outside emergency department yesterday, now also with worsening left-sided headache. Her primary concern is for complication relating to the  shunt. Her head CT shows slitlike ventricles, unchanged from multiple similar priors. Her laboratory evaluation is unremarkable, without evidence of an infectious or acute intra-abdominal inflammatory process. The patient's case was discussed with Dr. Maida Lombardo, who is well familiar with the patient's case. He indicates that she has been struggling with worsening abdominal pain for at least a year, with various etiologies proposed, but with no successful long-term management having been discovered. He notes that the patient's description of a shunt infection, for which she was hospitalized in [place], upon follow-up ultimately showed uniformly negative cultures and was ultimately likely not actually representative of an infectious process. He feels that it is very unlikely that her persistent and worsening abdominal pain is related to a complication of her  shunt. He indicates that the patient has been referred to gastroenterology for follow-up but has been resistant to that notion.   He also notes that it has been proposed that her symptoms could be related to an allergy to her shunt material, although he feels that this is unlikely given how long her  shunt has been in place. Overall, it is felt by the patient's neurosurgeon that the  shunt is not likely to be contributing to her symptoms of abdominal pain. She has unfortunately had persistent/recurrent symptoms of abdominal pain for quite some time, with no etiology having been discovered on multiple ED visits and with multiple CT scans and laboratory work-ups in the last several months. There was a point at which a cystic mass in the abdominal wall was felt to be potentially the cause of her abdominal pain, but it has not improved with excision of this mass. At this point, there is not an indication for repeat cross-sectional imaging. The patient's symptoms were controlled in the emergency department. She was encouraged to continue to pursue follow-up with gastroenterology, and with her primary care provider as an outpatient. Clinical Impression     1. Right sided abdominal pain        Disposition     PATIENT REFERRED TO:  Loida Shepherd, [address] [place] [address]  [address] [place] [address]  [phone]    Call on 9/20/2021  To discuss your ER visit, and arrange a follow-up appointment      DISCHARGE MEDICATIONS:  Discharge Medication List as of 9/18/2021  7:57 PM      START taking these medications    Details   oxyCODONE-acetaminophen (PERCOCET) 5-325 MG per tablet Take 1 tablet by mouth every 6 hours as needed for Pain for up to 3 days. Intended supply: 3 days. Take lowest dose possible to manage pain, Disp-12 tablet, R-0Print      promethazine (PHENERGAN) 25 MG tablet Take 1 tablet by mouth 3 times daily as needed for Nausea, Disp-12 tablet, R-0Print             DISPOSITION decision to discharge    (Please note that portions of this note were completed with voice recognition software.   Efforts were made to edit the dictations but occasionally words are mis-transcribed.)     Bijan Lubin MD  09/18/21 [id number]

## 2021-09-18 NOTE — ED NOTES
Bed: B15-15  Expected date:   Expected time:   Means of arrival:   Comments:  [address], RN  09/18/21 [id number]

## 2021-09-18 NOTE — ED NOTES
Patient DCed from ED. Discussed medications, AVS, and follow up. She verbalizes understanding of all information. IV DCed. Patient states that she does have a ride, and her sister is coming to pick her up. Patient ambulated out of ED with a steady gait.       Teresa Snyder RN  09/17/21 [address], RN  09/17/21 [id number]

## 2021-09-18 NOTE — ED PROVIDER NOTES
**THIS IS AN INDEPENDENT ALDAIR ENCOUNTER**          [address]        Pt Name: Juana Martinez  MRN: [id number]  [place] 1980  Date of evaluation: 9/17/2021  Provider: FREDRICK Mayfield CNP  PCP: FREDRICK Victor CNP  Note Started: 9:35 PM EDT       CHIEF COMPLAINT       Chief Complaint   Patient presents with    Abdominal Pain     abd pain and swelling, off and on for a month, Pts neuro to had  shunt checked, 8/10       HISTORY OF PRESENT ILLNESS   (Location, Timing/Onset, Context/Setting, Quality, Duration, Modifying Factors, Severity, Associated Signs and Symptoms)  Note limiting factors. Chief Complaint: Right-sided abdominal pain    Juana Martinez is a 46 y.o. female with PMH significant for IBS, DM, kidney stones, hydrocephalus with  shunt, HLD, and cigarette smoking who presents to the emergency department today complaining of right-sided abdominal pain. Onset was 4 weeks ago. Symptom started spontaneously and have been intermittent. She has nausea and vomiting that are also intermittent. Normal bowel movements. No fevers. No headache. Surgical history includes cholecystectomy and appendectomy. Nursing Notes were all reviewed and agreed with or any disagreements were addressed in the HPI. REVIEW OF SYSTEMS    (2-9 systems for level 4, 10 or more for level 5)     Review of Systems   Constitutional: Negative for chills, diaphoresis and fever. Respiratory: Negative for cough and shortness of breath. Cardiovascular: Negative for chest pain. Gastrointestinal: Positive for abdominal distention, abdominal pain (right sided), nausea and vomiting. Negative for blood in stool, constipation and diarrhea. Genitourinary: Negative for dysuria, frequency, hematuria, pelvic pain, vaginal bleeding and vaginal discharge. Musculoskeletal: Negative for back pain. Skin: Negative for color change and rash. Allergic/Immunologic: Negative for immunocompromised state. Neurological: Negative for dizziness, weakness, numbness and headaches. Hematological: Negative for adenopathy. Psychiatric/Behavioral: Negative for confusion. All other systems reviewed and are negative. Positives and Pertinent negatives as per HPI. Except as noted above in the ROS, all other systems were reviewed and negative. PAST MEDICAL HISTORY     Past Medical History:   Diagnosis Date    ADHD (attention deficit hyperactivity disorder) 82    Depression with anxiety     Diabetes mellitus ([facility] [address]     pre-diabetes    Difficult intubation     Encounter for imaging to screen for metal prior to MRI 06/01/2021    MRI Conditional Medtronic Non-Programmable shunt model#[id number] implanted 10/30/2020 at [facility]. Normal Mode. 1.5T or 3.0T.    ESBL (extended spectrum beta-lactamase) producing bacteria infection 11/06/2019    urine    Functional ovarian cysts 2008    rt ovary cyst x 2 yrs.     Headache(784.0)     migraines    History of blood transfusion     at birth   Juan Daniel Salm History of kidney stones     History of PCOS     Hydrocephalus ([facility] [address]     Hyperlipidemia     Irritable bowel syndrome 2004    had colonoscopy about 6 yrs ago    Meningitis 02/1023    Neutrophilic leukocytosis     Nicotine dependence     PONV (postoperative nausea and vomiting)     very nauseated and sometimes wakes up with a Migraine--happened once after brain surgery    Primary osteoarthritis of left knee 07/01/2016    S/P cone biopsy of cervix 2004    Scoliosis 1990.s     (ventriculoperitoneal) shunt status 1982    hydrocephalus f/w Neuro[facility]geon [facility]     (ventriculoperitoneal) shunt status     Wears glasses     reading         SURGICAL HISTORY     Past Surgical History:   Procedure Laterality Date    ABDOMEN SURGERY N/A 7/14/2021    REMOVAL OF ABDOMINAL WALL MASS performed by Arnoldo Carvajal MD at [address] [place] [address]  2003 appendicitis     SECTION      placenta previa    CHOLECYSTECTOMY      COLONOSCOPY  2004    COLPOSCOPY      CSF SHUNT      replaced at age 17    CYSTOSCOPY      stone removal    HEMORRHOID SURGERY      HERNIA REPAIR Bilateral     inguinal    HERNIA REPAIR      hiatal hernia    HYSTERECTOMY, TOTAL ABDOMINAL  2016    TAHBSO, adhesions/PCOS    KNEE ARTHROSCOPY Left 2015    medial meniscectomy, chondroplasty, plica resection    LITHOTRIPSY Bilateral 12/10/2019    OTHER SURGICAL HISTORY  10/19/2016    op lap    VENTRICULOPERITONEAL SHUNT      multiple revisions, most recent          CURRENTMEDICATIONS       Previous Medications    ALPRAZOLAM (XANAX) 0.5 MG TABLET    Take 0.5 mg by mouth daily as needed. LISDEXAMFETAMINE (VYVANSE) 30 MG CAPSULE    Take 30 mg by mouth every morning. PROMETHAZINE (PHENERGAN) 25 MG TABLET    Take 1 tablet by mouth every 6 hours as needed for Nausea WARNING:  May cause drowsiness. May impair ability to operate vehicles or machinery. Do not use in combination with alcohol.     SIMVASTATIN (ZOCOR) 10 MG TABLET    Take 1 tablet by mouth nightly    TAMSULOSIN (FLOMAX) 0.4 MG CAPSULE    Take 1 capsule by mouth daily         ALLERGIES     Bee venom, Bentyl [dicyclomine hcl], Mushroom extract complex, Sulfa antibiotics, Vancomycin, Adhesive tape, Toradol [ketorolac tromethamine], Acetaminophen, Ceftaroline, Daptomycin, Dicyclomine, Fioricet-codeine [butalbital-apap-caff-cod], Levaquin [levofloxacin], Methocarbamol, Prochlorperazine, Tazobactam, and Zosyn [piperacillin sod-tazobactam so]    FAMILYHISTORY       Family History   Problem Relation Age of Onset    High Blood Pressure Mother     Diabetes Mother     High Cholesterol Mother     Depression Mother     Diabetes Maternal Grandmother     High Blood Pressure Maternal Grandmother     High Cholesterol Maternal Grandmother     Heart Disease Maternal Grandfather     High Blood Pressure Maternal Grandfather     Heart Disease Paternal Grandmother     Stroke Paternal Grandfather     Depression Sister     Cirrhosis Father     Rheum Arthritis Neg Hx     Osteoarthritis Neg Hx     Asthma Neg Hx     Breast Cancer Neg Hx     Cancer Neg Hx     Heart Failure Neg Hx     Hypertension Neg Hx     Migraines Neg Hx     Ovarian Cancer Neg Hx     Rashes/Skin Problems Neg Hx     Seizures Neg Hx     Thyroid Disease Neg Hx           SOCIAL HISTORY       Social History     Tobacco Use    Smoking status: Current Some Day Smoker     Packs/day: 0.50     Years: 18.00     Pack years: 9.00     Types: Cigarettes    Smokeless tobacco: Never Used   Vaping Use    Vaping Use: Never used   Substance Use Topics    Alcohol use: No     Alcohol/week: 0.0 standard drinks    Drug use: Never       SCREENINGS             PHYSICAL EXAM    (up to 7 for level 4, 8 or more for level 5)     ED Triage Vitals [09/17/21 2040]   BP Temp Temp Source Pulse Resp SpO2 Height Weight   (!) 152/94 99 °F (37.2 °C) Oral 110 18 98 % 4' 11\" (1.499 m) 161 lb 2.5 oz (73.1 kg)       Physical Exam  Vitals and nursing note reviewed. Constitutional:       General: She is not in acute distress. Appearance: Normal appearance. She is well-developed. She is not toxic-appearing. HENT:      Head: Normocephalic and atraumatic. Eyes:      General: No scleral icterus. Conjunctiva/sclera: Conjunctivae normal.   Neck:      Vascular: No JVD. Cardiovascular:      Rate and Rhythm: Normal rate and regular rhythm. Heart sounds: Normal heart sounds. Pulmonary:      Effort: Pulmonary effort is normal. No respiratory distress. Breath sounds: Normal breath sounds. Abdominal:      General: Bowel sounds are normal. There is no distension. Palpations: Abdomen is soft. Abdomen is not rigid. Tenderness: There is abdominal tenderness. There is no right CVA tenderness, left CVA tenderness, guarding or rebound. Musculoskeletal:         General: Normal range of motion. Cervical back: Full passive range of motion without pain and neck supple. No rigidity. Skin:     General: Skin is warm and dry. Findings: No rash. Neurological:      General: No focal deficit present. Mental Status: She is alert and oriented to person, place, and time.    Psychiatric:         Mood and Affect: Mood normal.         DIAGNOSTIC RESULTS   LABS:    Labs Reviewed   COMPREHENSIVE METABOLIC PANEL W/ REFLEX TO MG FOR LOW K - Abnormal; Notable for the following components:       Result Value    Glucose 116 (*)     AST 13 (*)     All other components within normal limits    Narrative:     Performed at:  [address] [place] [address]   Phone [phone]   CBC WITH AUTO DIFFERENTIAL    Narrative:     Performed at:  [address] [place] [address]   Phone [phone]   LIPASE    Narrative:     Performed at:  [facility]  [address] [place] [address] [place] [address]   Phone [phone]   TROPONIN    Narrative:     Performed at:  [facility]  [address] [place] [address] [facility] [place] [address]   Phone [phone]   URINE RT REFLEX TO CULTURE    Narrative:     Performed at:  [address] [place] [address]   Phone [phone]   PROCALCITONIN    Narrative:     Performed at:  [facility]  [address] [place] [address] [facility] [place] [address]   Phone [phone]   LACTIC ACID, PLASMA    Narrative:     Performed at:  [address] [place] [address]   Phone [phone]   MAGNESIUM    Narrative:     Performed at:  [facility]  [address] [place] [address] [facility] [place] [address]   Phone [phone] When ordered only abnormal lab results are displayed. All other labs were within normal range or not returned as of this dictation. EKG: When ordered, EKG's are interpreted by the Emergency Department Physician in the absence of a cardiologist.  Please see their note for interpretation of EKG. RADIOLOGY:   Non-plain film images such as CT, Ultrasound and MRI are read by the radiologist. Plain radiographic images are visualized and preliminarily interpreted by the ED Provider with the below findings:        Interpretation per the Radiologist below, if available at the time of this note:    No orders to display     [facility] Additional Contrast? None    Result Date: 9/12/2021  EXAMINATION: CT OF THE ABDOMEN AND PELVIS WITHOUT CONTRAST 9/12/2021 8:24 pm TECHNIQUE: CT of the abdomen and pelvis was performed without the administration of intravenous contrast. Multiplanar reformatted images are provided for review. Dose modulation, iterative reconstruction, and/or weight based adjustment of the mA/kV was utilized to reduce the radiation dose to as low as reasonably achievable. COMPARISON: 08/31/2021 HISTORY: ORDERING SYSTEM PROVIDED HISTORY: Right flank pain. History of kidney stones TECHNOLOGIST PROVIDED HISTORY: Additional Contrast?->None Reason for exam:->Right flank pain. History of kidney stones Decision Support Exception - unselect if not a suspected or confirmed emergency medical condition->Emergency Medical Condition (MA) Is the patient pregnant?->No Reason for Exam: Right flank pain. History of kidney stones Acuity: Acute Type of Exam: Initial Additional signs and symptoms: Flank Pain (bilat x1wk, with n/v, dysuria) Relevant Medical/Surgical History: Current Some Day Smoker, 0.5 ppd, 9 pack-years.   Hx of scolosis, prediabetes, hydrocephalus, onchomycosis, ureteritis, acute cystitis, urinary retention, neutrophilic leukocytosis, UTI, renal colic, abdominal wall mas, cellulitis of abdominal wall, abdominal surgery (07/14/2021), bilateral lithrotripsy (12/10/2019), hysterectomy (11/09/2016), hernia repair (1988 adn 2015), appendectomy (2003), cholecystectomy, cystoscopy, and hemorrhoid surgery. No hx of any cancer. Incidental note is made of the fact that this is the patient's 6th abdomen and pelvis CT in the last 3 months FINDINGS: Lower Chest: The lung bases are clear Organs: There is a 3 mm nonobstructing stone in the inferior pole the right kidney. Otherwise, the solid organs of the abdomen are unremarkable for a noncontrast exam.  The gallbladder has been removed. GI/Bowel: There is no evidence of bowel wall thickening, inflammation or free fluid. There is no bowel obstruction. Pelvis: There is no free fluid Peritoneum/Retroperitoneum: The abdominal aorta and iliac arteries are normal in caliber. There is no pathologic adenopathy. Bones/Soft Tissues: Shunt tubing is identified entering the right aspect of the abdomen. The tip of the catheter is located in the mid anterior lower abdomen. No acute intra-abdominal or pelvic abnormality.  3 mm nonobstructing right renal calculus           PROCEDURES   Unless otherwise noted below, none     Procedures    CRITICAL CARE TIME   N/A    CONSULTS:  None      EMERGENCY DEPARTMENT COURSE and DIFFERENTIAL DIAGNOSIS/MDM:   Vitals:    Vitals:    09/17/21 2040 09/17/21 2147 09/17/21 2201   BP: (!) 152/94 (!) 149/79 120/87   Pulse: 110 88 87   Resp: 18 18 24   Temp: 99 °F (37.2 °C)     TempSrc: Oral     SpO2: 98%  100%   Weight: 161 lb 2.5 oz (73.1 kg)     Height: 4' 11\" (1.499 m)         Patient was given the following medications:  Medications   oxyCODONE-acetaminophen (PERCOCET) 5-325 MG per tablet 1 tablet (has no administration in time range)   ondansetron (ZOFRAN) injection 4 mg (4 mg IntraVENous Given 9/17/21 2210)   hyoscyamine (LEVSIN/SL) sublingual tablet 125 mcg (125 mcg SubLINGual Given 9/17/21 2234)           Differential diagnosis: Abdominal Aortic Aneurysm, Acute Coronary Syndrome, Ischemic Bowel, Bowel Obstruction (including Gastric Outlet Obstruction), PUD, GERD, Acute Cholecystitis, Pancreatitis, Hepatitis, Colitis, SMA Syndrome, Mesenteric Steal Syndrome, Splanchnic Vein Thrombosis, Appendicitis, Diverticulitis, Pyelonephritis, UTI, STD, Gonad Torsion, other     Patient seen and examined today for 4-week history of right-sided abdominal pain. See HPI for patient presentation. Patient is hemodynamically stable, nontoxic, afebrile, and without tachycardia, tachypnea, and hypoxia. Physical exam as above. 43-year-old lying in bed in no acute distress. Awake alert and oriented. No neurovascular deficits. Right-sided abdominal TTP. Abdomen soft without rigidity guarding or peritoneal signs. CBC chemistry lipase normal.  Urine shows no infection blood or ketones. Lactic acid and procalcitonin normal.      Patient given pain medicine. She just had a CT done 5 days ago which was normal and I do not feel this needs to be repeated. She is also had CT head done to evaluate her shunt status. She has good outpatient resources. Discharged home in stable condition. At this time, the evidence for any other entities in the differential is insufficient to justify any further testing. This was explained to the patient. The patient was advised that persistent or worsening symptoms will require further evaluation. I discussed with Shade Goldberg and/or family the exam results, diagnosis, care, prognosis, reasons to return and the importance of follow up. Patient and/or family is in full agreement with plan and all questions have been answered. Specific discharge instructions explained, including reasons to return to the emergency department. Shade Goldberg is well appearing, non-toxic, and afebrile at the time of discharge.  Patient was instructed to follow up with primary care provider in 24-48 hours, and to instructed to return to ED immediately for any new or worsening concerns. Giuliano Posey verbalized understanding and discharged home. The patient tolerated their visit well. I saw the patient independently with physician available for consultation as needed. The patient and / or the family were informed of the results of any tests, a time was given to answer questions, a plan was proposed and they agreed with plan. FINAL IMPRESSION      1. Right sided abdominal pain          DISPOSITION/PLAN   DISPOSITION Decision To Discharge 09/17/2021 10:39:11 PM      PATIENT REFERRED TO:  Dorita Dutta, [address] [place] [address]  [address] [facility]  [phone]    Schedule an appointment as soon as possible for a visit       Marla Hall MD  [address]    Schedule an appointment as soon as possible for a visit       [facility] Emergency Department  [address] [address]  [phone]  Go to   As needed      DISCHARGE MEDICATIONS:  New Prescriptions    HYOSCYAMINE SULFATE SL (LEVSIN/SL) 0.125 MG SUBL    Place 0.125 mg under the tongue every 4 hours as needed (abdominal pain)    ONDANSETRON (ZOFRAN ODT) 4 MG DISINTEGRATING TABLET    Take 1-2 tablets by mouth every 12 hours as needed for Nausea May Sub regular tablet (non-ODT) if insurance does not cover ODT.        DISCONTINUED MEDICATIONS:  Discontinued Medications    No medications on file              (Please note that portions of this note were completed with a voice recognition program.  Efforts were made to edit the dictations but occasionally words are mis-transcribed.)    Muriel Lesches, APRN - CNP (electronically signed)            Muriel Lesches, APRN - CNP  09/17/21 [id number]

## 2021-09-18 NOTE — ED NOTES
Patient is a difficult stick. Attempted to place IV and this RN was unsuccessful. Charge RN to use ultrasound to place line.       Cristal Chowdhury RN  09/17/21 9599

## 2021-09-19 NOTE — ED NOTES
.Patient prepared for and ready to be discharged. Patient discharged at this time in no acute distress after verbalizing understanding of discharge instructions. Patient left after receiving After Visit Summary instructions.       Payal Burk RN  09/18/21 2007

## 2021-09-26 ENCOUNTER — APPOINTMENT (OUTPATIENT)
Dept: CT IMAGING | Age: 41
End: 2021-09-26
Payer: COMMERCIAL

## 2021-09-26 ENCOUNTER — HOSPITAL ENCOUNTER (EMERGENCY)
Age: 41
Discharge: HOME | End: 2021-09-27
Attending: EMERGENCY MEDICINE
Payer: COMMERCIAL

## 2021-09-26 ENCOUNTER — APPOINTMENT (OUTPATIENT)
Dept: GENERAL RADIOLOGY | Age: 41
End: 2021-09-26
Payer: COMMERCIAL

## 2021-09-26 DIAGNOSIS — L03.213 PRESEPTAL CELLULITIS: Primary | ICD-10-CM

## 2021-09-26 PROCEDURE — 96374 THER/PROPH/DIAG INJ IV PUSH: CPT

## 2021-09-26 PROCEDURE — 85025 COMPLETE CBC W/AUTO DIFF WBC: CPT

## 2021-09-26 PROCEDURE — 96375 TX/PRO/DX INJ NEW DRUG ADDON: CPT

## 2021-09-26 PROCEDURE — 83605 ASSAY OF LACTIC ACID: CPT

## 2021-09-26 PROCEDURE — 36415 COLL VENOUS BLD VENIPUNCTURE: CPT

## 2021-09-26 PROCEDURE — 6360000002 HC RX W HCPCS: Performed by: EMERGENCY MEDICINE

## 2021-09-26 PROCEDURE — 83735 ASSAY OF MAGNESIUM: CPT

## 2021-09-26 PROCEDURE — 96361 HYDRATE IV INFUSION ADD-ON: CPT

## 2021-09-26 PROCEDURE — 99284 EMERGENCY DEPT VISIT MOD MDM: CPT

## 2021-09-26 PROCEDURE — 2580000003 HC RX 258: Performed by: EMERGENCY MEDICINE

## 2021-09-26 PROCEDURE — 71045 X-RAY EXAM CHEST 1 VIEW: CPT

## 2021-09-26 PROCEDURE — 70450 CT HEAD/BRAIN W/O DYE: CPT

## 2021-09-26 PROCEDURE — 80048 BASIC METABOLIC PNL TOTAL CA: CPT

## 2021-09-26 RX ORDER — 0.9 % SODIUM CHLORIDE 0.9 %
500 INTRAVENOUS SOLUTION INTRAVENOUS ONCE
Status: COMPLETED | OUTPATIENT
Start: 2021-09-27 | End: 2021-09-27

## 2021-09-26 RX ORDER — ONDANSETRON 2 MG/ML
4 INJECTION INTRAMUSCULAR; INTRAVENOUS ONCE
Status: COMPLETED | OUTPATIENT
Start: 2021-09-26 | End: 2021-09-26

## 2021-09-26 RX ORDER — DIPHENHYDRAMINE HYDROCHLORIDE 50 MG/ML
12.5 INJECTION INTRAMUSCULAR; INTRAVENOUS ONCE
Status: COMPLETED | OUTPATIENT
Start: 2021-09-26 | End: 2021-09-26

## 2021-09-26 RX ADMIN — SODIUM CHLORIDE 500 ML: 9 INJECTION, SOLUTION INTRAVENOUS at 23:53

## 2021-09-26 RX ADMIN — DIPHENHYDRAMINE HYDROCHLORIDE 12.5 MG: 50 INJECTION INTRAMUSCULAR; INTRAVENOUS at 23:51

## 2021-09-26 RX ADMIN — ONDANSETRON 4 MG: 2 INJECTION INTRAMUSCULAR; INTRAVENOUS at 23:51

## 2021-09-26 ASSESSMENT — PAIN SCALES - GENERAL: PAINLEVEL_OUTOF10: 8

## 2021-09-26 ASSESSMENT — PAIN DESCRIPTION - LOCATION: LOCATION: HEAD

## 2021-09-26 ASSESSMENT — PAIN DESCRIPTION - PAIN TYPE: TYPE: ACUTE PAIN

## 2021-09-27 ENCOUNTER — TELEPHONE (OUTPATIENT)
Dept: PRIMARY CARE CLINIC | Age: 41
End: 2021-09-27

## 2021-09-27 ENCOUNTER — PATIENT MESSAGE (OUTPATIENT)
Dept: PRIMARY CARE CLINIC | Age: 41
End: 2021-09-27

## 2021-09-27 VITALS
TEMPERATURE: 98.9 F | RESPIRATION RATE: 18 BRPM | BODY MASS INDEX: 30.24 KG/M2 | HEIGHT: 59 IN | SYSTOLIC BLOOD PRESSURE: 117 MMHG | OXYGEN SATURATION: 100 % | WEIGHT: 150 LBS | DIASTOLIC BLOOD PRESSURE: 85 MMHG | HEART RATE: 98 BPM

## 2021-09-27 VITALS
WEIGHT: 159.17 LBS | HEIGHT: 59 IN | RESPIRATION RATE: 16 BRPM | BODY MASS INDEX: 32.09 KG/M2 | HEART RATE: 112 BPM | SYSTOLIC BLOOD PRESSURE: 118 MMHG | TEMPERATURE: 98.5 F | OXYGEN SATURATION: 100 % | DIASTOLIC BLOOD PRESSURE: 68 MMHG

## 2021-09-27 DIAGNOSIS — L03.213 PRESEPTAL CELLULITIS OF LEFT EYE: Primary | ICD-10-CM

## 2021-09-27 LAB
A/G RATIO: 1.2 (ref 1.1–2.2)
ALBUMIN SERPL-MCNC: 4 G/DL (ref 3.4–5)
ALP BLD-CCNC: 124 U/L (ref 40–129)
ALT SERPL-CCNC: 65 U/L (ref 10–40)
ANION GAP SERPL CALCULATED.3IONS-SCNC: 12 MMOL/L (ref 3–16)
ANION GAP SERPL CALCULATED.3IONS-SCNC: 13 MMOL/L (ref 3–16)
AST SERPL-CCNC: 62 U/L (ref 15–37)
BASOPHILS ABSOLUTE: 0 K/UL (ref 0–0.2)
BASOPHILS ABSOLUTE: 0.1 K/UL (ref 0–0.2)
BASOPHILS RELATIVE PERCENT: 0.7 %
BASOPHILS RELATIVE PERCENT: 0.7 %
BILIRUB SERPL-MCNC: 0.5 MG/DL (ref 0–1)
BUN BLDV-MCNC: 12 MG/DL (ref 7–20)
BUN BLDV-MCNC: 14 MG/DL (ref 7–20)
CALCIUM SERPL-MCNC: 9.2 MG/DL (ref 8.3–10.6)
CALCIUM SERPL-MCNC: 9.4 MG/DL (ref 8.3–10.6)
CHLORIDE BLD-SCNC: 102 MMOL/L (ref 99–110)
CHLORIDE BLD-SCNC: 102 MMOL/L (ref 99–110)
CO2: 21 MMOL/L (ref 21–32)
CO2: 24 MMOL/L (ref 21–32)
CREAT SERPL-MCNC: 0.6 MG/DL (ref 0.6–1.1)
CREAT SERPL-MCNC: 0.6 MG/DL (ref 0.6–1.1)
EOSINOPHILS ABSOLUTE: 0.1 K/UL (ref 0–0.6)
EOSINOPHILS ABSOLUTE: 0.2 K/UL (ref 0–0.6)
EOSINOPHILS RELATIVE PERCENT: 2 %
EOSINOPHILS RELATIVE PERCENT: 2.5 %
GFR AFRICAN AMERICAN: >60
GFR AFRICAN AMERICAN: >60
GFR NON-AFRICAN AMERICAN: >60
GFR NON-AFRICAN AMERICAN: >60
GLOBULIN: 3.3 G/DL
GLUCOSE BLD-MCNC: 124 MG/DL (ref 70–99)
GLUCOSE BLD-MCNC: 95 MG/DL (ref 70–99)
HCT VFR BLD CALC: 40.3 % (ref 36–48)
HCT VFR BLD CALC: 41.4 % (ref 36–48)
HEMOGLOBIN: 13.6 G/DL (ref 12–16)
HEMOGLOBIN: 14.1 G/DL (ref 12–16)
LACTIC ACID, SEPSIS: 0.7 MMOL/L (ref 0.4–1.9)
LYMPHOCYTES ABSOLUTE: 2.1 K/UL (ref 1–5.1)
LYMPHOCYTES ABSOLUTE: 2.3 K/UL (ref 1–5.1)
LYMPHOCYTES RELATIVE PERCENT: 32 %
LYMPHOCYTES RELATIVE PERCENT: 33.1 %
MAGNESIUM: 1.7 MG/DL (ref 1.8–2.4)
MCH RBC QN AUTO: 29.4 PG (ref 26–34)
MCH RBC QN AUTO: 29.7 PG (ref 26–34)
MCHC RBC AUTO-ENTMCNC: 33.8 G/DL (ref 31–36)
MCHC RBC AUTO-ENTMCNC: 34.1 G/DL (ref 31–36)
MCV RBC AUTO: 86 FL (ref 80–100)
MCV RBC AUTO: 88 FL (ref 80–100)
MONOCYTES ABSOLUTE: 0.3 K/UL (ref 0–1.3)
MONOCYTES ABSOLUTE: 0.4 K/UL (ref 0–1.3)
MONOCYTES RELATIVE PERCENT: 4.9 %
MONOCYTES RELATIVE PERCENT: 5 %
NEUTROPHILS ABSOLUTE: 3.7 K/UL (ref 1.7–7.7)
NEUTROPHILS ABSOLUTE: 4.4 K/UL (ref 1.7–7.7)
NEUTROPHILS RELATIVE PERCENT: 59.2 %
NEUTROPHILS RELATIVE PERCENT: 59.9 %
PDW BLD-RTO: 13.2 % (ref 12.4–15.4)
PDW BLD-RTO: 13.6 % (ref 12.4–15.4)
PLATELET # BLD: 239 K/UL (ref 135–450)
PLATELET # BLD: 250 K/UL (ref 135–450)
PMV BLD AUTO: 7.6 FL (ref 5–10.5)
PMV BLD AUTO: 7.8 FL (ref 5–10.5)
POTASSIUM REFLEX MAGNESIUM: 3.2 MMOL/L (ref 3.5–5.1)
POTASSIUM SERPL-SCNC: 4.5 MMOL/L (ref 3.5–5.1)
RBC # BLD: 4.58 M/UL (ref 4–5.2)
RBC # BLD: 4.81 M/UL (ref 4–5.2)
REASON FOR REJECTION: NORMAL
REJECTED TEST: NORMAL
SODIUM BLD-SCNC: 136 MMOL/L (ref 136–145)
SODIUM BLD-SCNC: 138 MMOL/L (ref 136–145)
TOTAL PROTEIN: 7.3 G/DL (ref 6.4–8.2)
WBC # BLD: 6.3 K/UL (ref 4–11)
WBC # BLD: 7.3 K/UL (ref 4–11)

## 2021-09-27 PROCEDURE — 85025 COMPLETE CBC W/AUTO DIFF WBC: CPT

## 2021-09-27 PROCEDURE — 6370000000 HC RX 637 (ALT 250 FOR IP): Performed by: PHYSICIAN ASSISTANT

## 2021-09-27 PROCEDURE — 96374 THER/PROPH/DIAG INJ IV PUSH: CPT

## 2021-09-27 PROCEDURE — 96375 TX/PRO/DX INJ NEW DRUG ADDON: CPT

## 2021-09-27 PROCEDURE — 6360000002 HC RX W HCPCS: Performed by: EMERGENCY MEDICINE

## 2021-09-27 PROCEDURE — 99284 EMERGENCY DEPT VISIT MOD MDM: CPT

## 2021-09-27 PROCEDURE — 36415 COLL VENOUS BLD VENIPUNCTURE: CPT

## 2021-09-27 PROCEDURE — 80053 COMPREHEN METABOLIC PANEL: CPT

## 2021-09-27 PROCEDURE — 6360000002 HC RX W HCPCS: Performed by: PHYSICIAN ASSISTANT

## 2021-09-27 PROCEDURE — 6370000000 HC RX 637 (ALT 250 FOR IP): Performed by: EMERGENCY MEDICINE

## 2021-09-27 RX ORDER — OXYCODONE HYDROCHLORIDE AND ACETAMINOPHEN 5; 325 MG/1; MG/1
1 TABLET ORAL EVERY 4 HOURS PRN
COMMUNITY
End: 2021-09-27

## 2021-09-27 RX ORDER — METOCLOPRAMIDE HYDROCHLORIDE 5 MG/ML
10 INJECTION INTRAMUSCULAR; INTRAVENOUS ONCE
Status: COMPLETED | OUTPATIENT
Start: 2021-09-27 | End: 2021-09-27

## 2021-09-27 RX ORDER — PROMETHAZINE HYDROCHLORIDE 25 MG/1
25 TABLET ORAL EVERY 6 HOURS PRN
Qty: 12 TABLET | Refills: 0 | Status: SHIPPED | OUTPATIENT
Start: 2021-09-27 | End: 2021-09-28 | Stop reason: ALTCHOICE

## 2021-09-27 RX ORDER — CLINDAMYCIN HYDROCHLORIDE 300 MG/1
300 CAPSULE ORAL 4 TIMES DAILY
Qty: 28 CAPSULE | Refills: 0 | Status: SHIPPED | OUTPATIENT
Start: 2021-09-27 | End: 2021-10-04

## 2021-09-27 RX ORDER — TRAMADOL HYDROCHLORIDE 50 MG/1
50 TABLET ORAL EVERY 6 HOURS PRN
Qty: 12 TABLET | Refills: 0 | Status: SHIPPED | OUTPATIENT
Start: 2021-09-27 | End: 2021-09-30

## 2021-09-27 RX ORDER — CLINDAMYCIN HYDROCHLORIDE 150 MG/1
300 CAPSULE ORAL ONCE
Status: COMPLETED | OUTPATIENT
Start: 2021-09-27 | End: 2021-09-27

## 2021-09-27 RX ORDER — CETIRIZINE HYDROCHLORIDE 10 MG/1
10 TABLET ORAL DAILY
Qty: 30 TABLET | Refills: 0 | Status: SHIPPED | OUTPATIENT
Start: 2021-09-27 | End: 2021-10-26

## 2021-09-27 RX ORDER — CLINDAMYCIN PHOSPHATE 300 MG/50ML
300 INJECTION INTRAVENOUS ONCE
Status: DISCONTINUED | OUTPATIENT
Start: 2021-09-27 | End: 2021-09-27

## 2021-09-27 RX ORDER — CLINDAMYCIN HYDROCHLORIDE 300 MG/1
300 CAPSULE ORAL ONCE
Status: COMPLETED | OUTPATIENT
Start: 2021-09-27 | End: 2021-09-27

## 2021-09-27 RX ORDER — PROMETHAZINE HYDROCHLORIDE 25 MG/ML
12.5 INJECTION, SOLUTION INTRAMUSCULAR; INTRAVENOUS ONCE
Status: COMPLETED | OUTPATIENT
Start: 2021-09-27 | End: 2021-09-27

## 2021-09-27 RX ORDER — OXYCODONE HYDROCHLORIDE AND ACETAMINOPHEN 5; 325 MG/1; MG/1
1 TABLET ORAL ONCE
Status: COMPLETED | OUTPATIENT
Start: 2021-09-27 | End: 2021-09-27

## 2021-09-27 RX ADMIN — METOCLOPRAMIDE HYDROCHLORIDE 10 MG: 5 INJECTION INTRAMUSCULAR; INTRAVENOUS at 01:20

## 2021-09-27 RX ADMIN — CLINDAMYCIN HYDROCHLORIDE 300 MG: 300 CAPSULE ORAL at 22:07

## 2021-09-27 RX ADMIN — HYDROMORPHONE HYDROCHLORIDE 1 MG: 1 INJECTION, SOLUTION INTRAMUSCULAR; INTRAVENOUS; SUBCUTANEOUS at 22:08

## 2021-09-27 RX ADMIN — PROMETHAZINE HYDROCHLORIDE 12.5 MG: 25 INJECTION INTRAMUSCULAR; INTRAVENOUS at 21:18

## 2021-09-27 RX ADMIN — OXYCODONE HYDROCHLORIDE AND ACETAMINOPHEN 1 TABLET: 5; 325 TABLET ORAL at 01:21

## 2021-09-27 RX ADMIN — CLINDAMYCIN HYDROCHLORIDE 300 MG: 150 CAPSULE ORAL at 01:21

## 2021-09-27 ASSESSMENT — PAIN DESCRIPTION - LOCATION: LOCATION: ABDOMEN

## 2021-09-27 ASSESSMENT — PAIN SCALES - GENERAL: PAINLEVEL_OUTOF10: 9

## 2021-09-27 ASSESSMENT — ENCOUNTER SYMPTOMS
NAUSEA: 1
EYE REDNESS: 0
VOMITING: 1
NAUSEA: 1
SORE THROAT: 0
COUGH: 0
SHORTNESS OF BREATH: 0
EYE PAIN: 1
VOMITING: 0
COLOR CHANGE: 1
PHOTOPHOBIA: 0
FACIAL SWELLING: 1
COLOR CHANGE: 0
BACK PAIN: 0
TROUBLE SWALLOWING: 0
DIARRHEA: 0
PHOTOPHOBIA: 0
ABDOMINAL PAIN: 0

## 2021-09-27 ASSESSMENT — PAIN DESCRIPTION - FREQUENCY: FREQUENCY: CONTINUOUS

## 2021-09-27 ASSESSMENT — PAIN DESCRIPTION - DESCRIPTORS: DESCRIPTORS: ACHING

## 2021-09-27 ASSESSMENT — PAIN DESCRIPTION - PAIN TYPE: TYPE: ACUTE PAIN

## 2021-09-27 NOTE — ED PROVIDER NOTES
developing erythema and swelling around the left eye. Denies head injury. She is not taking medications for her symptoms. Denies pain with eye movements or changes in vision. Denies numbness or weakness or neck stiffness or pain. Reviewing the patient medical record she was seen the previous month with a similar presentation. At the time it was thought that this may be a reaction, that appears neurosurgeries is concerned about infection. Patient has not been in touch with her neurosurgeons which he states are through the [address] [place] [address]. HPI    NursingNotes were reviewed. REVIEW OF SYSTEMS    (2-9 systems for level 4, 10 or more for level 5)     Review of Systems   Constitutional: Positive for fever. Negative for activity change and fatigue. HENT: Positive for facial swelling. Negative for congestion, mouth sores and trouble swallowing. Eyes: Negative for photophobia and visual disturbance. Respiratory: Negative for cough and shortness of breath. Cardiovascular: Negative for chest pain and palpitations. Gastrointestinal: Positive for nausea. Negative for abdominal pain and vomiting. Genitourinary: Negative for difficulty urinating and frequency. Musculoskeletal: Negative for gait problem and neck pain. Skin: Positive for rash. Negative for color change. Neurological: Positive for headaches. Negative for dizziness, seizures, facial asymmetry, weakness, light-headedness and numbness. Psychiatric/Behavioral: Negative for confusion. The patient is not nervous/anxious. All other systems reviewed and are negative. Except as noted above the remainder of the review of systems was reviewed and negative.        PAST MEDICAL HISTORY     Past Medical History:   Diagnosis Date    ADHD (attention deficit hyperactivity disorder) 82    Depression with anxiety     Diabetes mellitus ([facility] [address]     pre-diabetes    Difficult intubation     Encounter for imaging to screen for metal prior to MRI 2021    MRI Conditional Medtronic Non-Programmable shunt model#39296 implanted 10/30/2020 at [facility]. Normal Mode. 1.5T or 3.0T.    ESBL (extended spectrum beta-lactamase) producing bacteria infection 2019    urine    Functional ovarian cysts 2008    rt ovary cyst x 2 yrs.     Headache(784.0)     migraines    History of blood transfusion     at birth   Reena See History of kidney stones     History of PCOS     Hydrocephalus ([address]     Hyperlipidemia     Irritable bowel syndrome 2004    had colonoscopy about 6 yrs ago    Meningitis     Neutrophilic leukocytosis     Nicotine dependence     PONV (postoperative nausea and vomiting)     very nauseated and sometimes wakes up with a Migraine--happened once after brain surgery    Primary osteoarthritis of left knee 2016    S/P cone biopsy of cervix     Scoliosis .s     (ventriculoperitoneal) shunt status     hydrocephalus f[facility]w [facility]     (ventriculoperitoneal) shunt status     Wears glasses     reading         SURGICALHISTORY       Past Surgical History:   Procedure Laterality Date    ABDOMEN SURGERY N/A 2021    REMOVAL OF ABDOMINAL WALL MASS performed by Madelyn Terry MD at [address] [place] [address]      appendicitis     SECTION  2005    placenta previa    CHOLECYSTECTOMY      COLONOSCOPY  2004    COLPOSCOPY      CSF SHUNT      replaced at age 17   Reena See CYSTOSCOPY      stone removal    HEMORRHOID SURGERY      HERNIA REPAIR Bilateral     inguinal    HERNIA REPAIR      hiatal hernia    HYSTERECTOMY, TOTAL ABDOMINAL  2016    TAHBSO, adhesions/PCOS    KNEE ARTHROSCOPY Left 2015    medial meniscectomy, chondroplasty, plica resection    LITHOTRIPSY Bilateral 12/10/2019    OTHER SURGICAL HISTORY  10/19/2016    op lap    VENTRICULOPERITONEAL SHUNT      multiple revisions, most recent          CURRENT MEDICATIONS       Previous Medications education: None    Highest education level: None   Occupational History    Occupation: disability, SSI    Tobacco Use    Smoking status: Current Some Day Smoker     Packs/day: 0.50     Years: 18.00     Pack years: 9.00     Types: Cigarettes    Smokeless tobacco: Never Used   Vaping Use    Vaping Use: Never used   Substance and Sexual Activity    Alcohol use: No     Alcohol/week: 0.0 standard drinks    Drug use: Never    Sexual activity: Not Currently     Partners: Male   Other Topics Concern    None   Social History Narrative    None     Social Determinants of Health     Financial Resource Strain: Low Risk     Difficulty of Paying Living Expenses: Not hard at all   Food Insecurity: No Food Insecurity    Worried About Running Out of Food in the Last Year: Never true    [place] of Food in the Last Year: Never true   Transportation Needs:     Lack of Transportation (Medical):  Lack of Transportation (Non-Medical):    Physical Activity:     Days of Exercise per Week:     Minutes of Exercise per Session:    Stress:     Feeling of Stress :    Social Connections:     Frequency of Communication with Friends and Family:     Frequency of Social Gatherings with Friends and Family:     Attends Orthodox Services:     Active Member of Clubs or Organizations:     Attends Club or Organization Meetings:     Marital Status:    Intimate Partner Violence:     Fear of Current or Ex-Partner:     Emotionally Abused:     Physically Abused:     Sexually Abused:        SCREENINGS             PHYSICAL EXAM    (up to 7 for level 4, 8 or more for level 5)     ED Triage Vitals [09/26/21 2304]   BP Temp Temp Source Pulse Resp SpO2 Height Weight   129/81 98.5 °F (36.9 °C) Oral 112 16 100 % 4' 11\" (1.499 m) 159 lb 2.8 oz (72.2 kg)       Physical Exam  Vitals and nursing note reviewed. Constitutional:       Appearance: She is well-developed. HENT:      Head: Normocephalic and atraumatic.    Eyes:      General: Right eye: No discharge. Left eye: No discharge. Extraocular Movements: Extraocular movements intact. Conjunctiva/sclera: Conjunctivae normal.      Pupils: Pupils are equal, round, and reactive to light. Comments: Erythema and soft tissue swelling to the superior and inferior eyelids of the left eye. Neck:      Trachea: No tracheal deviation. Cardiovascular:      Rate and Rhythm: Normal rate and regular rhythm. Heart sounds: Normal heart sounds. Pulmonary:      Effort: Pulmonary effort is normal.      Breath sounds: Normal breath sounds. Abdominal:      General: There is no distension. Palpations: Abdomen is soft. Tenderness: There is no abdominal tenderness. Musculoskeletal:         General: Normal range of motion. Cervical back: Normal range of motion. Skin:     General: Skin is warm and dry. Capillary Refill: Capillary refill takes less than 2 seconds. Neurological:      General: No focal deficit present. Mental Status: She is alert and oriented to person, place, and time. Mental status is at baseline. Cranial Nerves: No cranial nerve deficit. Sensory: No sensory deficit. Motor: No weakness.       Gait: Gait normal.         RESULTS     EKG: All EKG's are interpreted by the Emergency Department Physician who either signs or Co-signsthis chart in the absence of a cardiologist.    RADIOLOGY:   Non-plain filmimages such as CT, Ultrasound and MRI are read by the radiologist. Plain radiographic images are visualized and preliminarily interpreted by the emergency physician with the below findings:      Interpretation per the Radiologist below, if available at the time ofthis note:    [address]    (Results Pending)    [address] (<4 VIEWS)    (Results Pending)         ED BEDSIDE ULTRASOUND:   Performed by ED Physician - none    LABS:  Labs Reviewed   CBC WITH AUTO DIFFERENTIAL   BASIC METABOLIC PANEL W/ REFLEX TO MG FOR LOW K   LACTATE, SEPSIS   LACTATE, SEPSIS   URINE RT REFLEX TO CULTURE       All other labs were within normal range or not returned as of this dictation. EMERGENCY DEPARTMENT COURSE and DIFFERENTIAL DIAGNOSIS/MDM:   Vitals:    Vitals:    09/26/21 2304   BP: 129/81   Pulse: 112   Resp: 16   Temp: 98.5 °F (36.9 °C)   TempSrc: Oral   SpO2: 100%   Weight: 159 lb 2.8 oz (72.2 kg)   Height: 4' 11\" (1.499 m)       Patient was given thefollowing medications:  Medications   ondansetron (ZOFRAN) injection 4 mg (has no administration in time range)   diphenhydrAMINE (BENADRYL) injection 12.5 mg (has no administration in time range)       ED COURSE & MEDICAL DECISION MAKING    Pertinent Labs & Imaging studies reviewed. (See chart for details)   -  Patient seen and evaluated in the emergency department. -  Triage and nursing notes reviewed and incorporated. -  Old chart records reviewed and incorporated. -  Differential diagnosis includes: Differential Diagnosis: Subarachnoid hemorrhage, Meningitis, Temporal arteritis, Pseudotumor Cerebri, Migraine, other    -  Work-up included:  See above  -  ED treatment included: See above  -  Results discussed with patient. Patient presents ED for evaluation of periorbital swelling erythema to the left eye. She is concerned it may be related to her  shunt. Had a similar presentation at outside hospital and was seen by neurology and neurosurgery and was thought to be potentially allergic. Denies focal logical deficits and physical exam is benign. Labs show no emergent laboratory maladies. No leukocytosis. Patient afebrile without headache changes in vision or neurological deficits. imaging studies show no indication for shunt dysfunction or intracranial abnormalities and CT of the head. Results discussed with the patient when she will be treated for preseptal cellulitis. She is encouraged to follow-up with her neurosurgeon. Patient feels improved on reevaluation. Symptomatic treatment with expectant management discussed with the patient and they and/or family members present are amenable to treatment plan and outpatient follow-up. Strict return precautions were discussed with the patient and those present. They demonstrated understanding of when to return to the emergency department for new or worsening symptoms. .  The patient is agreeable with plan of care and disposition. REASSESSMENT          CRITICAL CARE TIME   Total Critical Care time was 10 minutes, excluding separately reportable procedures. There was a high probability of clinically significant/life threatening deterioration in the patient's condition which required my urgent intervention. CONSULTS:  None    PROCEDURES:  Unless otherwise noted below, none     Procedures    FINAL IMPRESSION      1. Preseptal cellulitis          DISPOSITION/PLAN   DISPOSITION        PATIENT REFERREDTO:  No follow-up provider specified.     DISCHARGEMEDICATIONS:  New Prescriptions    No medications on file          (Please note that portions of this note were completed with a voice recognition program.  Efforts were made to edit the dictations but occasionally words are mis-transcribed.)    Jacobo Garza MD (electronically signed)  Attending Emergency Physician          Jacobo Garza MD  09/28/21 7623

## 2021-09-27 NOTE — ED NOTES
D/C: Order noted for d/c. Pt confirmed d/c paperwork and RX have correct name. Discharge and education instructions reviewed with patient. Teach-back successful. Pt verbalized understanding and signed d/c papers. Pt denied questions at this time. No acute distress noted. Patient instructed to follow-up as noted - return to emergency department if symptoms worsen. Patient verbalized understanding. Discharged per EDMD with discharge instructions. Pt discharged to private vehicle. Patient stable upon departure. Thanked patient for choosing [facility] for care. Provider aware of patient pain at time of discharge.      Dominik Barbour RN  09/27/21 0130

## 2021-09-27 NOTE — TELEPHONE ENCOUNTER
Pt called and stated that she went to the er yesterday for cellulitis of the face. Stating that she can not keep down the antibiotics that she was given and that it is spreading to the other eye. Pt swears it has something to do with her shunt. Pt is not sure what to do    Per ama since pt is unable to keep oral antibiotics down the best thing to do would be to go back to the hospital to get iv antibiotics. Other option would be a suppository antibiotic and nausea medication     Called and spoke with pt and she stated that she does not want the suppository and will go to [facility] and would like for me to call and let them know that she is coming    [address] and informed them that pt was coming and explained the situation.  Hs

## 2021-09-27 NOTE — ED NOTES
Bed: B-07  Expected date: 9/26/21  Expected time: 10:58 PM  Means of arrival: Walk In  Comments:  Eye swelling     Liberty Valadez RN  09/26/21 5113

## 2021-09-27 NOTE — DISCHARGE INSTR - COC
Continuity of Care Form    Patient Name: Terry Escobar   :  1980  MRN:  [id number]    Admit date:  2021  Discharge date:  ***    Code Status Order: Prior   Advance Directives:     Admitting Physician:  No admitting provider for patient encounter.   PCP: FREDRICK Sevilla - CNP    Discharging Nurse: [facility] Unit/Room#: 007/B-07  Discharging Unit Phone Number: ***    Emergency Contact:   Extended Emergency Contact Information  Primary Emergency Contact: Satya Gomez [facility])  Home Phone: [phone]  Relation: Parent  Secondary Emergency Contact: violeta mendez  Home Phone: [phone]  Relation: Brother/Sister    Past Surgical History:  Past Surgical History:   Procedure Laterality Date    ABDOMEN SURGERY N/A 2021    REMOVAL OF ABDOMINAL WALL MASS performed by Jorge Berrios MD at [address]      appendicitis     SECTION  2005    placenta previa    CHOLECYSTECTOMY      COLONOSCOPY  2004    COLPOSCOPY      CSF SHUNT      replaced at age 17   [email or web address] CYSTOSCOPY      stone removal   [address] [place] Bilateral 1988    inguinal    HERNIA REPAIR  2015    hiatal hernia    HYSTERECTOMY, TOTAL ABDOMINAL  2016    TAHBSO, adhesions/PCOS    KNEE ARTHROSCOPY Left 2015    medial meniscectomy, chondroplasty, plica resection    LITHOTRIPSY Bilateral 12/10/2019    OTHER SURGICAL HISTORY  10/19/2016    op lap    VENTRICULOPERITONEAL SHUNT      multiple revisions, most recent        Immunization History:   Immunization History   Administered Date(s) Administered    Influenza Vaccine, unspecified formulation 10/19/2013    Influenza Virus Vaccine 2010, 10/06/2018, 10/30/2020       Active Problems:  Patient Active Problem List   Diagnosis Code     (ventriculoperitoneal) shunt status Z98.2    Scoliosis M41.9    Prediabetes R73.03    Tobacco smoker Z72.0    Chronic nonintractable headache R51.9, G89.29    Onychomycosis B35.1    Congenital hydrocephalus (HCC) Q03.9    Viral meningitis A87.9    Pyuria R82.81    Hypokalemia E87.6    Sepsis (HCC) A41.9    Meningitis G03.9    Allergy to multiple antibiotics Z88.1    Ureteritis N28.89    Acute cystitis without hematuria N30.00    Urinary retention R33.9    Guzman catheter in place Z97.8    Lactic acidosis U83.0    Neutrophilic leukocytosis F68.9    Complicated UTI (urinary tract infection) N39.0    Nausea & vomiting R91.0    Renal colic T37    Volume depletion E86.9    Headache R51.9    Herpes zoster B02.9    Intractable migraine with aura without status migrainosus G43.119    History of brain shunt Z98.2    Shunt malfunction T85.618A    Abdominal wall mass R22.2    Cellulitis of abdominal wall L03.311       Isolation/Infection:   Isolation          No Isolation        Patient Infection Status     Infection Onset Added Last Indicated Last Indicated By Review Planned Expiration Resolved Resolved By    None active    Resolved    COVID-19 Rule Out 20 COVID-19 (Ordered)   20     ESBL (Extended Spectrum Beta Lactamase) 19 Urine Culture   21 Joshua Ford RN    2019 urine  19 urine    Urine culture since has been negative (most recent 2020) with no ESBL in it          Nurse Assessment:  Last Vital Signs: /81   Pulse 112   Temp 98.5 °F (36.9 °C) (Oral)   Resp 16   Ht 4' 11\" (1.499 m)   Wt 159 lb 2.8 oz (72.2 kg)   LMP 10/31/2016 (Exact Date)   SpO2 100%   BMI 32.15 kg/m²     Last documented pain score (0-10 scale): Pain Level: 8  Last Weight:   Wt Readings from Last 1 Encounters:   21 159 lb 2.8 oz (72.2 kg)     Mental Status:  {IP PT MENTAL STATUS:}    IV Access:  {[facility] IV ACCESS:815475000}    Nursing Mobility/ADLs:  Walking   {[facility] OKYJ:187598124}  Transfer  {[facility] RWKK:182293679}  Bathing  {[facility] LVKH:198680192}  Dressing  {CHP DME WEYZ:864867129}  Toileting  {[facility] DME ZBYU:563248848}  Feeding  {[facility] DME ZTTA:804186977}  Med Admin  {P DME MLEE:338785808}  Med Delivery   { LINA MED Delivery:504718298}    Wound Care Documentation and Therapy:        Elimination:  Continence:   · Bowel: {YES / MZ:95381}  · Bladder: {YES / QR:45684}  Urinary Catheter: {Urinary Catheter:914905960}   Colostomy/Ileostomy/Ileal Conduit: {YES / LW:25154}       Date of Last BM: ***  No intake or output data in the 24 hours ending 21 2324  No intake/output data recorded.     Safety Concerns:     [address] Safety Concerns:535970639}    Impairments/Disabilities:      [address] Impairments/Disabilities:788875191}    Nutrition Therapy:  Current Nutrition Therapy:   [address] Diet List:557837662}    Routes of Feeding: {[facility] DME Other Feedings:713279665}  Liquids: {Slp liquid thickness:44773}  Daily Fluid Restriction: {[facility] DME Yes amt example:427000745}  Last Modified Barium Swallow with Video (Video Swallowing Test): {Done Not Done XGCE:664694514}    Treatments at the Time of Hospital Discharge:   Respiratory Treatments: ***  Oxygen Therapy:  {Therapy; copd oxygen:82790}  Ventilator:    {[facility] Vent ACLT:266210963}    Rehab Therapies: {THERAPEUTIC INTERVENTION:5255488326}  Weight Bearing Status/Restrictions: [address] [employer]  Weight Bearin}  Other Medical Equipment (for information only, NOT a DME order):  {EQUIPMENT:774985445}  Other Treatments: ***    Patient's personal belongings (please select all that are sent with patient):  {[facility] DME Belongings:996095085}    RN SIGNATURE:  {Esignature:133055411}    CASE MANAGEMENT/SOCIAL WORK SECTION    Inpatient Status Date: ***    Readmission Risk Assessment Score:  Readmission Risk              Risk of Unplanned Readmission:  0           Discharging to Facility/ Agency   · Name:   · Address:  · Phone:  · Fax:    Dialysis Facility (if applicable)   · Name:  · Address:  · Dialysis Schedule:  · Phone:  · Fax:    / signature: {Esignature:303239102}    PHYSICIAN SECTION    Prognosis: {Prognosis:8669122351}    Condition at Discharge: [address] Patient Condition:951005635}    Rehab Potential (if transferring to Rehab): {Prognosis:8820871893}    Recommended Labs or Other Treatments After Discharge: ***    Physician Certification: I certify the above information and transfer of Luna Laureano  is necessary for the continuing treatment of the diagnosis listed and that she requires {Admit to Appropriate Level of Care:97299} for {GREATER/LESS:166133109} 30 days.      Update Admission H&P: {[facility] DME Changes in ITIWU:386611447}    PHYSICIAN SIGNATURE:  {Esignature:235711011}

## 2021-09-27 NOTE — TELEPHONE ENCOUNTER
From: Salvador Cornell  To: FREDRICK Reyes CNP  Sent: 9/27/2021 2:07 PM EDT  Subject: Non-Urgent Medical Question    I'm throwing up from those antibiotics and it's hurts bad

## 2021-09-28 ENCOUNTER — TELEPHONE (OUTPATIENT)
Dept: PRIMARY CARE CLINIC | Age: 41
End: 2021-09-28

## 2021-09-28 DIAGNOSIS — R11.2 INTRACTABLE VOMITING WITH NAUSEA: Primary | ICD-10-CM

## 2021-09-28 RX ORDER — METOCLOPRAMIDE 10 MG/1
10 TABLET ORAL
Qty: 120 TABLET | Refills: 0 | Status: SHIPPED
Start: 2021-09-28 | End: 2021-09-28 | Stop reason: SINTOL

## 2021-09-28 NOTE — TELEPHONE ENCOUNTER
Unfortunately the antibiotic that would be most appropriate for her cellulitis is not available in suppository. We would need to do the nausea medicine in suppository form so she could tolerate it. I have sent another medication to help with nausea to the pharmacy, Reglan, take this instead of the phenergan to see if it helps more. Can send phenergan suppositories if needed.

## 2021-09-28 NOTE — TELEPHONE ENCOUNTER
Pt called and stated that she went to the hospital and they did not have a bed for her to stay to give her the iv antibiotics. They suggested that she go to another hospital or call ama to see if she would order a pic line for her to do the iv antibiotics at home    I told pt that I do not believe that this is something that ama can order and that her suggestion is going to be the suppository antibiotics.      Told her I would send message to ama

## 2021-09-28 NOTE — ED PROVIDER NOTES
[address] [facility] [address] ENCOUNTER          PHYSICIAN ASSISTANT NOTE       Date of evaluation: 9/27/2021    Chief Complaint     Other (\"its not a headache, my shunt hurts and it hurts into my stomach\" shunt pain started 3-4 days ago. Was seen at [address] [place] [address],[address] for CT and shunt series and was d/c. ), Facial Swelling (swelling to bilat eyes- started yesterday. ), and Abdominal Pain (states abdominal pain is worse today. )      History of Present Illness     Tuyet Toussaint is a 46 y.o. female who presents with complaint of facial swelling and abdominal pain. Patient states that she has been having abdominal pain in the right upper quadrant for the last month. She states that she has a  shunt that is in the right abdomen that she has been experiencing pain. She has been evaluated for this and tried to speak with her neurosurgeon but they do not feel it is related to the shunt. She went to an outside hospital last night and had imaging done that was normal.  She states that yesterday she also developed swelling, erythema and pain around the left eye. She states is gotten progressively worse and while she was at the hospital yesterday she was diagnosed with a preseptal cellulitis. She has been having nausea and vomiting as well as a left-sided pressure type headache and pain in the left mastoid. She was prescribed antibiotics last night, however, she states that she has vomited both times she has tried to take it and states that she was advised to come to the emergency department for reevaluation. She admits to fever of 100.9. Continues to feel nauseous although she has not vomited since this morning. Rates her pain in her head as a 9 out of 10. Review of Systems     Review of Systems   Constitutional: Negative for chills and fever. HENT: Negative for sore throat. Eyes: Positive for pain. Negative for photophobia, redness and visual disturbance.         Positive for eyelid swelling Gastrointestinal: Positive for nausea and vomiting. Negative for diarrhea. Musculoskeletal: Positive for neck pain. Negative for back pain, myalgias and neck stiffness. Skin: Positive for color change. Negative for wound. Neurological: Positive for headaches. Negative for speech difficulty, weakness, light-headedness and numbness. Psychiatric/Behavioral: Negative for confusion. All other systems reviewed and are negative. Past Medical, Surgical, Family, and Social History     She has a past medical history of ADHD (attention deficit hyperactivity disorder), Depression with anxiety, Diabetes mellitus ([facility] [address] Difficult intubation, Encounter for imaging to screen for metal prior to MRI, ESBL (extended spectrum beta-lactamase) producing bacteria infection, Functional ovarian cysts, Headache(784.0), History of blood transfusion, History of kidney stones, History of PCOS, Hydrocephalus ([facility] [address] Hyperlipidemia, Irritable bowel syndrome, Meningitis, Neutrophilic leukocytosis, Nicotine dependence, PONV (postoperative nausea and vomiting), Primary osteoarthritis of left knee, S/P cone biopsy of cervix, Scoliosis,  (ventriculoperitoneal) shunt status,  (ventriculoperitoneal) shunt status, and Wears glasses. She has a past surgical history that includes Appendectomy (); Colonoscopy (); Colposcopy ();  section (); csf shunt; Cystoscopy; Knee arthroscopy (Left, 2015); Cholecystectomy; other surgical history (10/19/2016); Ventriculoperitoneal shunt; Hysterectomy, total abdominal (2016); Lithotripsy (Bilateral, 12/10/2019); hernia repair (Bilateral, ); hernia repair (); Hemorrhoid surgery; and Abdomen surgery (N/A, 2021).   Her family history includes Cirrhosis in her father; Depression in her mother and sister; Diabetes in her maternal grandmother and mother; Heart Disease in her maternal grandfather and paternal grandmother; High Blood Pressure in her maternal grandfather, maternal grandmother, and mother; High Cholesterol in her maternal grandmother and mother; Stroke in her paternal grandfather. She reports that she has been smoking cigarettes. She has a 9.00 pack-year smoking history. She has never used smokeless tobacco. She reports that she does not drink alcohol and does not use drugs. Medications     Discharge Medication List as of 9/27/2021 11:01 PM      CONTINUE these medications which have NOT CHANGED    Details   lisdexamfetamine (VYVANSE) 30 MG capsule Take 30 mg by mouth every morning. Historical Med      ALPRAZolam (XANAX) 0.5 MG tablet Take 0.5 mg by mouth daily as needed. Historical Med      simvastatin (ZOCOR) 10 MG tablet Take 1 tablet by mouth nightly, Disp-90 tablet, R-1Normal      cetirizine (ZYRTEC) 10 MG tablet Take 1 tablet by mouth daily, Disp-30 tablet, R-0Normal      clindamycin (CLEOCIN) 300 MG capsule Take 1 capsule by mouth 4 times daily for 7 days, Disp-28 capsule, R-0Normal      Hyoscyamine Sulfate SL (LEVSIN/SL) 0.125 MG SUBL Place 0.125 mg under the tongue every 4 hours as needed (abdominal pain), Disp-30 each, R-1Normal      ondansetron (ZOFRAN ODT) 4 MG disintegrating tablet Take 1-2 tablets by mouth every 12 hours as needed for Nausea May Sub regular tablet (non-ODT) if insurance does not cover ODT., Disp-20 tablet, R-0Normal      tamsulosin (FLOMAX) 0.4 MG capsule Take 1 capsule by mouth daily, Disp-30 capsule, R-0Normal             Allergies     She is allergic to bee venom, bentyl [dicyclomine hcl], mushroom extract complex, sulfa antibiotics, vancomycin, adhesive tape, toradol [ketorolac tromethamine], acetaminophen, ceftaroline, daptomycin, dicyclomine, fioricet-codeine [butalbital-apap-caff-cod], levaquin [levofloxacin], methocarbamol, prochlorperazine, tazobactam, and zosyn [piperacillin sod-tazobactam so].     Physical Exam     INITIAL VITALS: BP: (!) 152/90, Temp: 97.7 °F (36.5 °C), Pulse: 110, Resp: 20, SpO2: 97 %  Physical Exam  Vitals and nursing note reviewed. Constitutional:       General: She is not in acute distress. Appearance: She is well-developed. She is not diaphoretic. HENT:      Head: Normocephalic and atraumatic. Left Ear: There is mastoid tenderness (swelling without erythema or bogginess  ). Eyes:      Extraocular Movements: Extraocular movements intact. Conjunctiva/sclera: Conjunctivae normal.      Right eye: Right conjunctiva is not injected. Left eye: Left conjunctiva is not injected. Pupils: Pupils are equal, round, and reactive to light. Comments: Swelling of the upper and lower left eyelids with erythema and tenderness. No pain with EOM     Cardiovascular:      Rate and Rhythm: Normal rate and regular rhythm. Heart sounds: Normal heart sounds. Pulmonary:      Effort: Pulmonary effort is normal. No respiratory distress. Breath sounds: Normal breath sounds. Abdominal:      Palpations: Abdomen is soft. Tenderness: There is abdominal tenderness in the right upper quadrant. There is no guarding or rebound. Musculoskeletal:         General: No tenderness. Normal range of motion. Cervical back: Normal range of motion. Skin:     General: Skin is warm and dry. Neurological:      General: No focal deficit present. Mental Status: She is alert and oriented to person, place, and time. Psychiatric:         Mood and Affect: Mood normal.         Behavior: Behavior normal.         Thought Content:  Thought content normal.         Judgment: Judgment normal.         Diagnostic Results         RADIOLOGY:  No orders to display       LABS:   Results for orders placed or performed during the hospital encounter of 09/27/21   Comprehensive Metabolic Panel   Result Value Ref Range    Sodium 136 136 - 145 mmol/L    Potassium 4.5 3.5 - 5.1 mmol/L    Chloride 102 99 - 110 mmol/L    CO2 21 21 - 32 mmol/L    Anion Gap 13 3 - 16    Glucose 95 70 - 99 mg/dL    BUN 12 7 - 20 Sig: Take 1 tablet by mouth every 6 hours as needed for Nausea WARNING:  May cause drowsiness. May impair ability to operate vehicles or machinery. Do not use in combination with alcohol. Dispense:  12 tablet     Refill:  0    traMADol (ULTRAM) 50 MG tablet     Sig: Take 1 tablet by mouth every 6 hours as needed for Pain for up to 3 days. Dispense:  12 tablet     Refill:  0       CONSULTS:  None    MEDICAL DECISION MAKING / ASSESSMENT / Danelle Sulema is a 46 y.o. female who presented to the emergency department for reevaluation of headache, eye swelling and abdominal pain. She has a  shunt and was evaluated yesterday and had imaging. Impression   1.  Ventriculoperitoneal shunt catheter is intact with no kinking or   fragmentation.  The position of the tip is unchanged from the prior exam.       2.  No acute process seen in the chest and abdomen. Impression   1.  Stable unenhanced CT of the brain.  No hemorrhage, mass effect, midline   shift, or hydrocephalus.       2.  Left frontal ventricular shunt is stable with slit-like ventricles.  The   configuration is unchanged from the old exams. She states she was put on antibiotics for a preseptal cellulitis, however, has been vomiting today and was advised to come into the emergency department for evaluation. She continues to have a preseptal cellulitis with no sign of orbital cellulitis. Labs were obtained that showed no leukocytosis. CMP obtained in triage and mild elevation of ALT 65 and AST 62, otherwise normal.    Patient was concerned that she may need admission for IV antibiotics due to having difficulty taking antibiotics previously. She was able to tolerate a dose of antibiotics in the emergency department required 1 dose of IV pain medication. She was given a very short course of Ultram for the acute phase of her infection and should take her previously prescribed antibiotic. She also requested additional antiemetic. If she continues to have any vomiting, fevers, increased swelling or any other concerns she should return the emergency room for reevaluation. She should otherwise follow-up with her neurosurgeon for reevaluation of her  shunt and her pain. I do not feel additional intervention of her  shunt is needed at this time. She is in agreement the plan and stable for discharge. This patient was also evaluated by the attending physician. All care plans were discussed and agreed upon. Clinical Impression     1. Preseptal cellulitis of left eye        Disposition     PATIENT REFERRED TO:  [address]  [address] [facility] [address] [address]          DISCHARGE MEDICATIONS:  Discharge Medication List as of 9/27/2021 11:01 PM      START taking these medications    Details   promethazine (PHENERGAN) 25 MG tablet Take 1 tablet by mouth every 6 hours as needed for Nausea WARNING:  May cause drowsiness. May impair ability to operate vehicles or machinery. Do not use in combination with alcohol., Disp-12 tablet, R-0Print      traMADol (ULTRAM) 50 MG tablet Take 1 tablet by mouth every 6 hours as needed for Pain for up to 3 days. , Disp-12 tablet, R-0Print             DISPOSITION Decision To Discharge 09/27/2021 10:42:48 PM        [facility]  09/27/21 [id number]

## 2021-09-28 NOTE — ED PROVIDER NOTES
ED Attending Attestation Note     Date of evaluation: 9/27/2021    This patient was seen by the resident. I have seen and examined the patient, agree with the workup, evaluation, management and diagnosis. The care plan has been discussed. My assessment reveals a 62-year-old female who presents with chief complaint of facial swelling, abdominal pain. States she has been having abdominal pain for a month, thinks it is from her shunt that she has from a history of hydrocephalus as a kid. Also is complaining of swelling around her eyes, told she was diagnosed with periorbital cellulitis yesterday, and has been unable to keep down the antibiotics and is throwing up. Patient on exam with swelling around both eyes, more pronounced on the left, with associated erythema, consistent with periorbital cellulitis. Patient with a relatively benign abdomen.   Nonperitoneal..       Evelin Jordan MD  09/27/21 [id number]

## 2021-09-28 NOTE — TELEPHONE ENCOUNTER
Called and spoke with pt. She stated that she is allergic to reglan and can not take this. She will stick with the phenergan. I told her she needs to try to keep the oral antibiotics down and to make sure she is taking them with food and that she takes a probiotic when she is on the antibiotic. Told her the only other option would be to go back to the er to try to get iv antibiotics and there is only oral antibiotics that [facility] can send. Pt understood and will call back if she has any issues.  Hs

## 2021-09-29 ENCOUNTER — HOSPITAL ENCOUNTER (EMERGENCY)
Age: 41
Discharge: HOME | End: 2021-09-29
Attending: EMERGENCY MEDICINE
Payer: COMMERCIAL

## 2021-09-29 VITALS
DIASTOLIC BLOOD PRESSURE: 98 MMHG | BODY MASS INDEX: 31.96 KG/M2 | HEART RATE: 94 BPM | TEMPERATURE: 97.9 F | RESPIRATION RATE: 16 BRPM | WEIGHT: 158.51 LBS | SYSTOLIC BLOOD PRESSURE: 154 MMHG | OXYGEN SATURATION: 100 % | HEIGHT: 59 IN

## 2021-09-29 DIAGNOSIS — R22.0 FACIAL SWELLING: Primary | ICD-10-CM

## 2021-09-29 LAB
A/G RATIO: 1.3 (ref 1.1–2.2)
ALBUMIN SERPL-MCNC: 4.5 G/DL (ref 3.4–5)
ALP BLD-CCNC: 148 U/L (ref 40–129)
ALT SERPL-CCNC: 76 U/L (ref 10–40)
ANION GAP SERPL CALCULATED.3IONS-SCNC: 18 MMOL/L (ref 3–16)
AST SERPL-CCNC: 33 U/L (ref 15–37)
BASOPHILS ABSOLUTE: 0.1 K/UL (ref 0–0.2)
BASOPHILS RELATIVE PERCENT: 1.3 %
BILIRUB SERPL-MCNC: 0.3 MG/DL (ref 0–1)
BUN BLDV-MCNC: 8 MG/DL (ref 7–20)
CALCIUM SERPL-MCNC: 9.8 MG/DL (ref 8.3–10.6)
CHLORIDE BLD-SCNC: 102 MMOL/L (ref 99–110)
CO2: 19 MMOL/L (ref 21–32)
CREAT SERPL-MCNC: 0.6 MG/DL (ref 0.6–1.1)
EOSINOPHILS ABSOLUTE: 0.2 K/UL (ref 0–0.6)
EOSINOPHILS RELATIVE PERCENT: 2.7 %
GFR AFRICAN AMERICAN: >60
GFR NON-AFRICAN AMERICAN: >60
GLOBULIN: 3.6 G/DL
GLUCOSE BLD-MCNC: 108 MG/DL (ref 70–99)
HCT VFR BLD CALC: 44.5 % (ref 36–48)
HEMOGLOBIN: 15.2 G/DL (ref 12–16)
LYMPHOCYTES ABSOLUTE: 2.6 K/UL (ref 1–5.1)
LYMPHOCYTES RELATIVE PERCENT: 28.9 %
MCH RBC QN AUTO: 29.2 PG (ref 26–34)
MCHC RBC AUTO-ENTMCNC: 34.1 G/DL (ref 31–36)
MCV RBC AUTO: 85.9 FL (ref 80–100)
MONOCYTES ABSOLUTE: 0.4 K/UL (ref 0–1.3)
MONOCYTES RELATIVE PERCENT: 4.9 %
NEUTROPHILS ABSOLUTE: 5.7 K/UL (ref 1.7–7.7)
NEUTROPHILS RELATIVE PERCENT: 62.2 %
PDW BLD-RTO: 13.6 % (ref 12.4–15.4)
PLATELET # BLD: 255 K/UL (ref 135–450)
PMV BLD AUTO: 8.1 FL (ref 5–10.5)
POTASSIUM SERPL-SCNC: 4.1 MMOL/L (ref 3.5–5.1)
RBC # BLD: 5.18 M/UL (ref 4–5.2)
SODIUM BLD-SCNC: 139 MMOL/L (ref 136–145)
TOTAL PROTEIN: 8.1 G/DL (ref 6.4–8.2)
WBC # BLD: 9.2 K/UL (ref 4–11)

## 2021-09-29 PROCEDURE — 85025 COMPLETE CBC W/AUTO DIFF WBC: CPT

## 2021-09-29 PROCEDURE — 99283 EMERGENCY DEPT VISIT LOW MDM: CPT

## 2021-09-29 PROCEDURE — 80053 COMPREHEN METABOLIC PANEL: CPT

## 2021-09-29 RX ORDER — PREDNISONE 50 MG/1
50 TABLET ORAL DAILY
Qty: 5 TABLET | Refills: 0 | Status: SHIPPED | OUTPATIENT
Start: 2021-09-29 | End: 2021-10-04

## 2021-09-29 ASSESSMENT — ENCOUNTER SYMPTOMS
COUGH: 0
VOMITING: 0
ABDOMINAL PAIN: 0
COLOR CHANGE: 0
EYE ITCHING: 0
CONSTIPATION: 0
SHORTNESS OF BREATH: 0
EYE DISCHARGE: 0

## 2021-09-29 ASSESSMENT — PAIN DESCRIPTION - DESCRIPTORS
DESCRIPTORS: DISCOMFORT
DESCRIPTORS: BURNING;SHARP

## 2021-09-29 ASSESSMENT — PAIN DESCRIPTION - PAIN TYPE
TYPE: ACUTE PAIN
TYPE: ACUTE PAIN

## 2021-09-29 ASSESSMENT — PAIN DESCRIPTION - ONSET: ONSET: ON-GOING

## 2021-09-29 ASSESSMENT — PAIN SCALES - GENERAL
PAINLEVEL_OUTOF10: 9
PAINLEVEL_OUTOF10: 9

## 2021-09-29 ASSESSMENT — PAIN DESCRIPTION - LOCATION
LOCATION: FACE
LOCATION: FACE

## 2021-09-29 ASSESSMENT — PAIN DESCRIPTION - FREQUENCY
FREQUENCY: CONTINUOUS
FREQUENCY: CONTINUOUS

## 2021-09-29 ASSESSMENT — PAIN - FUNCTIONAL ASSESSMENT
PAIN_FUNCTIONAL_ASSESSMENT: 0-10
PAIN_FUNCTIONAL_ASSESSMENT: ACTIVITIES ARE NOT PREVENTED

## 2021-09-29 ASSESSMENT — PAIN DESCRIPTION - PROGRESSION
CLINICAL_PROGRESSION: NOT CHANGED
CLINICAL_PROGRESSION: GRADUALLY WORSENING

## 2021-09-29 NOTE — ED PROVIDER NOTES
[address]      Pt Name: Rory Ruano  MRN: [id number]  [place] 1980  Date of evaluation: 9/29/2021  Provider: William Perdomo MD    [address] [place] [address]       Chief Complaint   Patient presents with    Facial Swelling     pt dx with cellulitis and told to return if worsened pt has swelling and redness around both eyes       HISTORY OF PRESENT ILLNESS    Rory Ruano is a 46 y.o. female who presents to the emergency department with facial swelling. Patient has been diagnosed with bilateral preseptal cellulitis of the eyes. Has been put on clindamycin. States she has not been taking it because she does not take pills. States every time she swallows pills it makes her nauseous and she has to throw up. Has been seen in the emergency room multiple times for this bilateral redness to her eyes. States she has a history of  shunt and thinks she has an allergy or has an infected shunt. Has also had this worked up by Alma Dorsey and neurosurgery multiple times per patient. States they never find anything wrong. States she is concerned that something is wrong and she wants to be admitted into the hospital.  Denies pain at this time. Endorses itchiness to both eyes. Denies visual disturbances. Denies watery eyes or discharge. Denies new detergents or exposures. States she has not seen her primary care doctor or ophthalmologist yet. States she called her neurosurgeon tonight and was directed to the emergency room. States she spoke with Dr. Negro Lee. No other associated symptoms. Nursing Notes were reviewed. Including nursing noted for FM, Surgical History, Past Medical History, Social History, vitals, and allergies; agree with all. REVIEW OF SYSTEMS       Review of Systems   Constitutional: Negative for diaphoresis and unexpected weight change. HENT: Negative for congestion and dental problem. Eyes: Negative for discharge and itching. Respiratory: Negative for cough and shortness of breath. Cardiovascular: Negative for chest pain and leg swelling. Gastrointestinal: Negative for abdominal pain, constipation and vomiting. Endocrine: Negative for cold intolerance and heat intolerance. Genitourinary: Negative for vaginal bleeding, vaginal discharge and vaginal pain. Musculoskeletal: Negative for neck pain and neck stiffness. Skin: Positive for rash. Negative for color change and pallor. Neurological: Negative for tremors and weakness. Psychiatric/Behavioral: Negative for agitation and behavioral problems. Except as noted above the remainder of the review of systems was reviewed and negative. PAST MEDICAL HISTORY     Past Medical History:   Diagnosis Date    ADHD (attention deficit hyperactivity disorder) 82    Depression with anxiety     Diabetes mellitus ([address]     pre-diabetes    Difficult intubation     Encounter for imaging to screen for metal prior to MRI 06/01/2021    MRI Conditional Medtronic Non-Programmable shunt model#39609 implanted 10/30/2020 at [facility]. Normal Mode. 1.5T or 3.0T.    ESBL (extended spectrum beta-lactamase) producing bacteria infection 11/06/2019    urine    Functional ovarian cysts 2008    rt ovary cyst x 2 yrs.     Headache(784.0)     migraines    History of blood transfusion     at birth   Corrina Gisel History of kidney stones     History of PCOS     Hydro[address]us [address] [address]     Hyperlipidemia     Irritable bowel syndrome 2004    had colonoscopy about 6 yrs ago    Meningitis 18/9534    Neutrophilic leukocytosis     Nicotine dependence     PONV (postoperative nausea and vomiting)     very nauseated and sometimes wakes up with a Migraine--happened once after brain surgery    Primary osteoarthritis of left knee 07/01/2016    S/P cone biopsy of cervix 2004    Scoliosis 1990.s     (ventriculoperitoneal) shunt status 1982    hydrocephalus f[facility]w [facility]geon [facility]     (ventriculoperitoneal) shunt status     Wears glasses     reading       SURGICAL HISTORY       Past Surgical History:   Procedure Laterality Date    ABDOMEN SURGERY N/A 2021    REMOVAL OF ABDOMINAL WALL MASS performed by Kylee Limon MD at [facility]      appendicitis     SECTION  2005    placenta previa    CHOLECYSTECTOMY      COLONOSCOPY  2004    COLPOSCOPY      CSF SHUNT      replaced at age 17    CYSTOSCOPY      stone removal    HEMORRHOID SURGERY      HERNIA REPAIR Bilateral     inguinal    HERNIA REPAIR      hiatal hernia    HYSTERECTOMY, TOTAL ABDOMINAL  2016    TAHBSO, adhesions/PCOS    KNEE ARTHROSCOPY Left 2015    medial meniscectomy, chondroplasty, plica resection    LITHOTRIPSY Bilateral 12/10/2019    OTHER SURGICAL HISTORY  10/19/2016    op lap    VENTRICULOPERITONEAL SHUNT      multiple revisions, most recent        CURRENT MEDICATIONS       Discharge Medication List as of 2021  4:35 AM      CONTINUE these medications which have NOT CHANGED    Details   cetirizine (ZYRTEC) 10 MG tablet Take 1 tablet by mouth daily, Disp-30 tablet, R-0Normal      clindamycin (CLEOCIN) 300 MG capsule Take 1 capsule by mouth 4 times daily for 7 days, Disp-28 capsule, R-0Normal      traMADol (ULTRAM) 50 MG tablet Take 1 tablet by mouth every 6 hours as needed for Pain for up to 3 days. , Disp-12 tablet, R-0Print      Hyoscyamine Sulfate SL (LEVSIN/SL) 0.125 MG SUBL Place 0.125 mg under the tongue every 4 hours as needed (abdominal pain), Disp-30 each, R-1Normal      ondansetron (ZOFRAN ODT) 4 MG disintegrating tablet Take 1-2 tablets by mouth every 12 hours as needed for Nausea May Sub regular tablet (non-ODT) if insurance does not cover ODT., Disp-20 tablet, R-0Normal      lisdexamfetamine (VYVANSE) 30 MG capsule Take 30 mg by mouth every morning. Historical Med      tamsulosin (FLOMAX) 0.4 MG capsule Take 1 capsule by mouth daily, Disp-30 capsule, R-0Normal      ALPRAZolam (XANAX) 0.5 MG tablet Take 0.5 mg by mouth daily as needed.  Historical Med      simvastatin (ZOCOR) 10 MG tablet Take 1 tablet by mouth nightly, Disp-90 tablet, R-1Normal             ALLERGIES     Bee venom, Bentyl [dicyclomine hcl], Mushroom extract complex, Sulfa antibiotics, Vancomycin, Adhesive tape, Toradol [ketorolac tromethamine], Acetaminophen, Ceftaroline, Daptomycin, Dicyclomine, Fioricet-codeine [butalbital-apap-caff-cod], Levaquin [levofloxacin], Methocarbamol, Prochlorperazine, Tazobactam, and Zosyn [piperacillin sod-tazobactam so]    FAMILY HISTORY        Family History   Problem Relation Age of Onset    High Blood Pressure Mother     Diabetes Mother     High Cholesterol Mother     Depression Mother     Diabetes Maternal Grandmother     High Blood Pressure Maternal Grandmother     High Cholesterol Maternal Grandmother     Heart Disease Maternal Grandfather     High Blood Pressure Maternal Grandfather     Heart Disease Paternal Grandmother     Stroke Paternal Grandfather     Depression Sister     Cirrhosis Father     Rheum Arthritis Neg Hx     Osteoarthritis Neg Hx     Asthma Neg Hx     Breast Cancer Neg Hx     Cancer Neg Hx     Heart Failure Neg Hx     Hypertension Neg Hx     Migraines Neg Hx     Ovarian Cancer Neg Hx     Rashes/Skin Problems Neg Hx     Seizures Neg Hx     Thyroid Disease Neg Hx        SOCIAL HISTORY       Social History     Socioeconomic History    Marital status: Single     Spouse name: Not on file    Number of children: Not on file    Years of education: Not on file    Highest education level: Not on file   Occupational History    Occupation: disability, SSI    Tobacco Use    Smoking status: Current Some Day Smoker     Packs/day: 0.50     Years: 18.00     Pack years: 9.00     Types: Cigarettes    Smokeless tobacco: Never Used   Vaping Use    Vaping Use: Never used   Substance and Sexual Activity    Alcohol use: No     Alcohol/week: 0.0 standard drinks    Drug use: Never    Sexual activity: Not Currently     Partners: Male   Other Topics Concern    Not on file   Social History Narrative    Not on file     Social Determinants of Health     Financial Resource Strain: Low Risk     Difficulty of Paying Living Expenses: Not hard at all   Food Insecurity: No Food Insecurity    Worried About [address] in the Last Year: Never true    [address] in the Last Year: Never true   Transportation Needs:     Lack of Transportation (Medical):  Lack of Transportation (Non-Medical):    Physical Activity:     Days of Exercise per Week:     Minutes of Exercise per Session:    Stress:     Feeling of Stress :    Social Connections:     Frequency of Communication with Friends and Family:     Frequency of Social Gatherings with Friends and Family:     Attends Mandaen Services:     Active Member of Clubs or Organizations:     Attends Club or Organization Meetings:     Marital Status:    Intimate Partner Violence:     Fear of Current or Ex-Partner:     Emotionally Abused:     Physically Abused:     Sexually Abused:        PHYSICAL EXAM       ED Triage Vitals [09/29/21 0306]   BP Temp Temp Source Pulse Resp SpO2 Height Weight   (!) 155/104 97.9 °F (36.6 °C) Oral 96 16 99 % 4' 11\" (1.499 m) 158 lb 8.2 oz (71.9 kg)       Physical Exam  Vitals and nursing note reviewed. Constitutional:       General: She is not in acute distress. Appearance: She is well-developed. She is not ill-appearing, toxic-appearing or diaphoretic. HENT:      Head: Normocephalic and atraumatic. Right Ear: External ear normal.      Left Ear: External ear normal.   Eyes:      General:         Right eye: No discharge. Left eye: No discharge. Conjunctiva/sclera: Conjunctivae normal.      Pupils: Pupils are equal, round, and reactive to light. Comments: Mild periorbital swelling to both eyes.   Left greater than right. Pupils equal and reactive. No scleral injection. No watery discharge. No pain with eye movement. Visual acuity intact. Swelling localized to the periorbital region. No pain or tenderness to palpation. No neck pain. Airway patent. No other rashes or insults. Cardiovascular:      Rate and Rhythm: Normal rate and regular rhythm. Heart sounds: No murmur heard. Pulmonary:      Effort: Pulmonary effort is normal. No respiratory distress. Breath sounds: Normal breath sounds. No wheezing or rales. Abdominal:      General: Bowel sounds are normal. There is no distension. Palpations: Abdomen is soft. There is no mass. Tenderness: There is no abdominal tenderness. There is no guarding or rebound. Genitourinary:     Comments: Deferred  Musculoskeletal:         General: No deformity. Normal range of motion. Cervical back: Normal range of motion and neck supple. Skin:     General: Skin is warm. Findings: No erythema or rash. Neurological:      Mental Status: She is alert and oriented to person, place, and time. She is not disoriented. Cranial Nerves: No cranial nerve deficit. Motor: No atrophy or abnormal muscle tone. Coordination: Coordination normal.   Psychiatric:         Behavior: Behavior normal.         Thought Content:  Thought content normal.         DIAGNOSTIC RESULTS     EKG: All EKG's are interpreted by the Emergency Department Physician who either signs or Co-signs this chart in the absence of acardiologist.    None    RADIOLOGY:   Non-plain film images such as CT, Ultrasoundand MRI are read by the radiologist. Plain radiographic images are visualized and preliminarily interpreted by the emergency physician with the below findings:    None    ED BEDSIDE ULTRASOUND:   Performed by ED Physician - none    LABS:  Labs Reviewed   COMPREHENSIVE METABOLIC PANEL - Abnormal; Notable for the following components:       Result Value    CO2 19 (*) Anion Gap 18 (*)     Glucose 108 (*)     Alkaline Phosphatase 148 (*)     ALT 76 (*)     All other components within normal limits    Narrative:     Performed at:  [facility]  [address]   Phone [phone]   CBC WITH AUTO DIFFERENTIAL    Narrative:     Performed at:  [facility]  [address]   Phone [phone]       All other labs were withinnormal range or not returned as of this dictation. EMERGENCY DEPARTMENT COURSE and DIFFERENTIAL DIAGNOSIS/MDM:     PMH, Surgical Hx, FH, Social Hx reviewed by myself (ETOH usage, Tobacco usage, Drug usage reviewed by myself, no pertinent Hx)- No Pertinent Hx     Old records were reviewed by me     70-year-old with periorbital swelling. Unclear etiology. Possible preseptal cellulitis. She has not followed up with ophthalmology like she was referred to. Told her to keep her referral and follow-up with Ortho. I called patient's spine doctor Dr. Jose Warren and he had no recollection of getting a call tonight. States he would not of sent her to the emergency room. Told her to follow-up with  in their office in the next 1 to 2 days for further evaluation. Does not think it is related to her  shunt but states they would be more than happy to see her outpatient. Do not think she needs to be inpatient as she is afebrile and has a normal white count. She has reassuring vitals as well as lab work. She has been seen multiple times for this in the emergency room and told to follow-up outpatient. She states she will follow up with neurosurgery in the next couple days. She states she will also follow-up with ophthalmology. I will put her on a short course of steroids just in case this is allergic in nature which is a possibility. I told her she should be taking her antibiotic she says she will try to do better about taking them.   I offered to give her a liquid and she said that is even worse. Told her to continue her pills and follow-up as directed. She verbalized understanding. I estimate there is LOW risk for Sepsis, MI, Stroke, Tamponade, PTX, Toxicity or other life threatening etiology thus I consider the discharge disposition reasonable. The patient is at low risk for mortality based on demographic, history and clinical factors. Given the best available information and clinical assessment, I estimate the risk of hospitalization to be greater than risk of treatment at home. I have explained to the patient that the risk could rapidly change, given precautions for return and instructions. Explained to patient that the risk for mortality is low based on demographic, history and clinical factors. I discussed with patient the results of evaluation in the ED, diagnosis, care, and prognosis. The plan is to discharge to home. Patient is in agreement with plan and questions have been answered. I also discussed with patient the reasons which may require a return visit and the importance of follow-up care. The patient is well-appearing, nontoxic, and improved at the time of discharge. Patient agrees to call to arrange follow-up care as directed. Patient understands to return immediately for worsening/change in symptoms. CRITICAL CARE TIME   Total Critical Caretime was 0 minutes, excluding separately reportable procedures. There was a high probability of clinically significant/life threatening deterioration in the patient's condition which required my urgent intervention. PROCEDURES:  Unlessotherwise noted below, none    FINAL IMPRESSION      1.  Facial swelling          DISPOSITION/PLAN   DISPOSITION Decision To Discharge 09/29/2021 04:23:18 AM    PATIENT REFERRED TO:  Fede Sauceda MD  [facility] [address]  [address]  [phone]    Call today  CALL FIRST THING IN AM      DISCHARGE MEDICATIONS:  Discharge Medication List as of 9/29/2021  4:35 AM      START taking these medications    Details   predniSONE (DELTASONE) 50 MG tablet Take 1 tablet by mouth daily for 5 days, Disp-5 tablet, R-0Print                (Please note that portions ofthis note were completed with a voice recognition program.  Efforts were made to edit the dictations but occasionally words are mis-transcribed.)    William Perdomo MD(electronically signed)  Attending Emergency Physician        William Perdomo MD  09/29/21 2045

## 2021-10-01 ENCOUNTER — TELEPHONE (OUTPATIENT)
Dept: PRIMARY CARE CLINIC | Age: 41
End: 2021-10-01

## 2021-10-01 NOTE — TELEPHONE ENCOUNTER
Patient called on-call provider. Patient advised she has been to the ER numerous times over the past several days and they will not admit her to the hospital. Patient advised that she knows there is something wrong with her self and that she is going to die because no one will listen to her. Patient advised she was prescribed antibiotics for facial cellulitis and she is unable to tolerate, advised she has developed fevers (Tmax 99.8) and is vomiting. Patient also advised that she is allergic to her silicone  shunt. Advised patient if she is unable to tolerate PO medications she should return to the ER, she should follow up with her neurosurgeon if she has concerns about  shunt. Total time on the phone with the patient was 18 minutes.

## 2021-10-04 ENCOUNTER — TELEPHONE (OUTPATIENT)
Dept: PRIMARY CARE CLINIC | Age: 41
End: 2021-10-04

## 2021-10-04 NOTE — TELEPHONE ENCOUNTER
10/02/21 at 9:03 pm, Pt called on-call provider. Advised she spoke with her neurologist, however he could not see her for several weeks. Pt advised she is having unbearable RUQ pain, I advised patient, she should go to ER if in unbearable pain and tramadol she was prescribed at past ER visit is not helping.

## 2021-10-06 ENCOUNTER — APPOINTMENT (OUTPATIENT)
Dept: CT IMAGING | Age: 41
End: 2021-10-06
Payer: COMMERCIAL

## 2021-10-06 ENCOUNTER — HOSPITAL ENCOUNTER (EMERGENCY)
Age: 41
Discharge: HOME | End: 2021-10-06
Attending: EMERGENCY MEDICINE
Payer: COMMERCIAL

## 2021-10-06 ENCOUNTER — TELEPHONE (OUTPATIENT)
Dept: PRIMARY CARE CLINIC | Age: 41
End: 2021-10-06

## 2021-10-06 ENCOUNTER — HOSPITAL ENCOUNTER (EMERGENCY)
Age: 41
Discharge: LEFT BEFORE DISCHARGE | End: 2021-10-06
Attending: EMERGENCY MEDICINE
Payer: COMMERCIAL

## 2021-10-06 ENCOUNTER — APPOINTMENT (OUTPATIENT)
Dept: GENERAL RADIOLOGY | Age: 41
End: 2021-10-06
Payer: COMMERCIAL

## 2021-10-06 VITALS
HEART RATE: 91 BPM | WEIGHT: 159.17 LBS | HEIGHT: 59 IN | OXYGEN SATURATION: 99 % | DIASTOLIC BLOOD PRESSURE: 81 MMHG | SYSTOLIC BLOOD PRESSURE: 145 MMHG | BODY MASS INDEX: 32.09 KG/M2 | RESPIRATION RATE: 16 BRPM | TEMPERATURE: 98.5 F

## 2021-10-06 VITALS
BODY MASS INDEX: 32.89 KG/M2 | HEART RATE: 117 BPM | DIASTOLIC BLOOD PRESSURE: 80 MMHG | RESPIRATION RATE: 16 BRPM | HEIGHT: 59 IN | TEMPERATURE: 98.1 F | OXYGEN SATURATION: 99 % | SYSTOLIC BLOOD PRESSURE: 128 MMHG | WEIGHT: 163.14 LBS

## 2021-10-06 DIAGNOSIS — R10.84 GENERALIZED ABDOMINAL PAIN: Primary | ICD-10-CM

## 2021-10-06 DIAGNOSIS — G89.29 OTHER CHRONIC PAIN: ICD-10-CM

## 2021-10-06 DIAGNOSIS — M79.10 MYALGIA: Primary | ICD-10-CM

## 2021-10-06 DIAGNOSIS — D72.829 LEUKOCYTOSIS, UNSPECIFIED TYPE: ICD-10-CM

## 2021-10-06 DIAGNOSIS — G44.85 PRIMARY STABBING HEADACHE: Primary | ICD-10-CM

## 2021-10-06 LAB
A/G RATIO: 1.3 (ref 1.1–2.2)
ALBUMIN SERPL-MCNC: 4.4 G/DL (ref 3.4–5)
ALP BLD-CCNC: 153 U/L (ref 40–129)
ALT SERPL-CCNC: 83 U/L (ref 10–40)
ANION GAP SERPL CALCULATED.3IONS-SCNC: 16 MMOL/L (ref 3–16)
AST SERPL-CCNC: 12 U/L (ref 15–37)
BASOPHILS ABSOLUTE: 0.1 K/UL (ref 0–0.2)
BASOPHILS RELATIVE PERCENT: 0.7 %
BILIRUB SERPL-MCNC: 0.3 MG/DL (ref 0–1)
BILIRUBIN URINE: NEGATIVE
BLOOD, URINE: NEGATIVE
BUN BLDV-MCNC: 10 MG/DL (ref 7–20)
CALCIUM SERPL-MCNC: 10.2 MG/DL (ref 8.3–10.6)
CHLORIDE BLD-SCNC: 102 MMOL/L (ref 99–110)
CLARITY: CLEAR
CO2: 24 MMOL/L (ref 21–32)
COLOR: YELLOW
CREAT SERPL-MCNC: 0.7 MG/DL (ref 0.6–1.1)
EOSINOPHILS ABSOLUTE: 0 K/UL (ref 0–0.6)
EOSINOPHILS RELATIVE PERCENT: 0 %
GFR AFRICAN AMERICAN: >60
GFR NON-AFRICAN AMERICAN: >60
GLOBULIN: 3.4 G/DL
GLUCOSE BLD-MCNC: 221 MG/DL (ref 70–99)
GLUCOSE URINE: NEGATIVE MG/DL
HCT VFR BLD CALC: 44.7 % (ref 36–48)
HEMOGLOBIN: 15.1 G/DL (ref 12–16)
KETONES, URINE: NEGATIVE MG/DL
LEUKOCYTE ESTERASE, URINE: NEGATIVE
LYMPHOCYTES ABSOLUTE: 1.3 K/UL (ref 1–5.1)
LYMPHOCYTES RELATIVE PERCENT: 10 %
MCH RBC QN AUTO: 29.3 PG (ref 26–34)
MCHC RBC AUTO-ENTMCNC: 33.7 G/DL (ref 31–36)
MCV RBC AUTO: 87 FL (ref 80–100)
MICROSCOPIC EXAMINATION: NORMAL
MONOCYTES ABSOLUTE: 0.2 K/UL (ref 0–1.3)
MONOCYTES RELATIVE PERCENT: 1.6 %
NEUTROPHILS ABSOLUTE: 11.6 K/UL (ref 1.7–7.7)
NEUTROPHILS RELATIVE PERCENT: 87.7 %
NITRITE, URINE: NEGATIVE
PDW BLD-RTO: 13.5 % (ref 12.4–15.4)
PH UA: 6.5 (ref 5–8)
PLATELET # BLD: 305 K/UL (ref 135–450)
PMV BLD AUTO: 8.5 FL (ref 5–10.5)
POTASSIUM REFLEX MAGNESIUM: 3.8 MMOL/L (ref 3.5–5.1)
PROTEIN UA: NEGATIVE MG/DL
RBC # BLD: 5.14 M/UL (ref 4–5.2)
SODIUM BLD-SCNC: 142 MMOL/L (ref 136–145)
SPECIFIC GRAVITY UA: <=1.005 (ref 1–1.03)
TOTAL PROTEIN: 7.8 G/DL (ref 6.4–8.2)
URINE REFLEX TO CULTURE: NORMAL
URINE TYPE: NORMAL
UROBILINOGEN, URINE: 0.2 E.U./DL
WBC # BLD: 13.2 K/UL (ref 4–11)

## 2021-10-06 PROCEDURE — 96376 TX/PRO/DX INJ SAME DRUG ADON: CPT

## 2021-10-06 PROCEDURE — 85025 COMPLETE CBC W/AUTO DIFF WBC: CPT

## 2021-10-06 PROCEDURE — 96375 TX/PRO/DX INJ NEW DRUG ADDON: CPT

## 2021-10-06 PROCEDURE — 81003 URINALYSIS AUTO W/O SCOPE: CPT

## 2021-10-06 PROCEDURE — 6360000004 HC RX CONTRAST MEDICATION: Performed by: EMERGENCY MEDICINE

## 2021-10-06 PROCEDURE — 2580000003 HC RX 258: Performed by: EMERGENCY MEDICINE

## 2021-10-06 PROCEDURE — 99284 EMERGENCY DEPT VISIT MOD MDM: CPT

## 2021-10-06 PROCEDURE — 74018 RADEX ABDOMEN 1 VIEW: CPT

## 2021-10-06 PROCEDURE — 80053 COMPREHEN METABOLIC PANEL: CPT

## 2021-10-06 PROCEDURE — 99282 EMERGENCY DEPT VISIT SF MDM: CPT

## 2021-10-06 PROCEDURE — 74177 CT ABD & PELVIS W/CONTRAST: CPT

## 2021-10-06 PROCEDURE — 36415 COLL VENOUS BLD VENIPUNCTURE: CPT

## 2021-10-06 PROCEDURE — 99283 EMERGENCY DEPT VISIT LOW MDM: CPT

## 2021-10-06 PROCEDURE — 96374 THER/PROPH/DIAG INJ IV PUSH: CPT

## 2021-10-06 PROCEDURE — 96361 HYDRATE IV INFUSION ADD-ON: CPT

## 2021-10-06 PROCEDURE — 6360000002 HC RX W HCPCS: Performed by: EMERGENCY MEDICINE

## 2021-10-06 RX ORDER — ONDANSETRON 2 MG/ML
4 INJECTION INTRAMUSCULAR; INTRAVENOUS ONCE
Status: COMPLETED | OUTPATIENT
Start: 2021-10-06 | End: 2021-10-06

## 2021-10-06 RX ORDER — 0.9 % SODIUM CHLORIDE 0.9 %
1000 INTRAVENOUS SOLUTION INTRAVENOUS ONCE
Status: COMPLETED | OUTPATIENT
Start: 2021-10-06 | End: 2021-10-06

## 2021-10-06 RX ADMIN — HYDROMORPHONE HYDROCHLORIDE 1 MG: 1 INJECTION, SOLUTION INTRAMUSCULAR; INTRAVENOUS; SUBCUTANEOUS at 15:57

## 2021-10-06 RX ADMIN — SODIUM CHLORIDE 1000 ML: 9 INJECTION, SOLUTION INTRAVENOUS at 13:05

## 2021-10-06 RX ADMIN — ONDANSETRON 4 MG: 2 INJECTION INTRAMUSCULAR; INTRAVENOUS at 15:58

## 2021-10-06 RX ADMIN — SODIUM CHLORIDE 1000 ML: 9 INJECTION, SOLUTION INTRAVENOUS at 15:57

## 2021-10-06 RX ADMIN — HYDROMORPHONE HYDROCHLORIDE 1 MG: 1 INJECTION, SOLUTION INTRAMUSCULAR; INTRAVENOUS; SUBCUTANEOUS at 13:05

## 2021-10-06 RX ADMIN — IOPAMIDOL 100 ML: 755 INJECTION, SOLUTION INTRAVENOUS at 12:52

## 2021-10-06 RX ADMIN — ONDANSETRON 4 MG: 2 INJECTION INTRAMUSCULAR; INTRAVENOUS at 13:06

## 2021-10-06 ASSESSMENT — PAIN - FUNCTIONAL ASSESSMENT
PAIN_FUNCTIONAL_ASSESSMENT: ACTIVITIES ARE NOT PREVENTED
PAIN_FUNCTIONAL_ASSESSMENT: 0-10
PAIN_FUNCTIONAL_ASSESSMENT: ACTIVITIES ARE NOT PREVENTED

## 2021-10-06 ASSESSMENT — PAIN SCALES - GENERAL
PAINLEVEL_OUTOF10: 8
PAINLEVEL_OUTOF10: 6
PAINLEVEL_OUTOF10: 6
PAINLEVEL_OUTOF10: 8
PAINLEVEL_OUTOF10: 7
PAINLEVEL_OUTOF10: 8

## 2021-10-06 ASSESSMENT — ENCOUNTER SYMPTOMS
TROUBLE SWALLOWING: 0
VOMITING: 0
STRIDOR: 0
VOICE CHANGE: 0
SHORTNESS OF BREATH: 0
ABDOMINAL PAIN: 1
COLOR CHANGE: 0
FACIAL SWELLING: 0
WHEEZING: 0
NAUSEA: 1

## 2021-10-06 ASSESSMENT — PAIN DESCRIPTION - DESCRIPTORS
DESCRIPTORS: SHARP;SHOOTING
DESCRIPTORS: SHARP
DESCRIPTORS: SHARP;SHOOTING;SORE

## 2021-10-06 ASSESSMENT — PAIN DESCRIPTION - FREQUENCY
FREQUENCY: CONTINUOUS

## 2021-10-06 ASSESSMENT — PAIN DESCRIPTION - ONSET
ONSET: ON-GOING
ONSET: ON-GOING

## 2021-10-06 ASSESSMENT — PAIN DESCRIPTION - ORIENTATION
ORIENTATION: MID;RIGHT;LOWER
ORIENTATION: MID;RIGHT;LOWER

## 2021-10-06 ASSESSMENT — PAIN DESCRIPTION - PAIN TYPE
TYPE: ACUTE PAIN

## 2021-10-06 ASSESSMENT — PAIN DESCRIPTION - LOCATION
LOCATION: ABDOMEN
LOCATION: HEAD;ABDOMEN;NECK
LOCATION: ABDOMEN

## 2021-10-06 ASSESSMENT — PAIN DESCRIPTION - PROGRESSION
CLINICAL_PROGRESSION: NOT CHANGED
CLINICAL_PROGRESSION: NOT CHANGED

## 2021-10-06 NOTE — ED TRIAGE NOTES
Patient with prolonged hx of  shunt issues, abdominal pain, headache related concerns. Patient has been experiencing recurrent abdominal pain for over a month and a half. Patient does have a neurosurgeon at [address] [place] [address] who she has had discussions with recently. Patient and NS MD have discussed the possibility of migration of  shunt tip. Patient reports home fevers intermittently for the last week. Increased WBC on CBC, in comparison to previous blood draws. Patient with increased pain and nausea. Patient tearful, but cooperative. Patient hopeful of a plan, to get her to her Neurosurgeon appointment on 10/11 comfortably.

## 2021-10-06 NOTE — TELEPHONE ENCOUNTER
Pt called stating that her Neuro is wanting  pt to be seen by a rheumatologist.  Pt is requesting a referral.  I told her that I would send a message back and somebody would be in contact with her.

## 2021-10-06 NOTE — ED PROVIDER NOTES
vaginal bleeding. Musculoskeletal: Negative for neck pain and neck stiffness. Skin: Negative for color change and wound. Neurological: Positive for syncope, light-headedness and headaches. Negative for seizures. Psychiatric/Behavioral: Negative for self-injury and suicidal ideas. Except as noted above the remainder of the review of systems was reviewed and negative. PAST MEDICAL HISTORY     Past Medical History:   Diagnosis Date    ADHD (attention deficit hyperactivity disorder) 82    Depression with anxiety     Diabetes mellitus ([facility] [address]     pre-diabetes    Difficult intubation     Encounter for imaging to screen for metal prior to MRI 06/01/2021    MRI Conditional Medtronic Non-Programmable shunt model#[id number] implanted 10/30/2020 at [facility]. Normal Mode. 1.5T or 3.0T.    ESBL (extended spectrum beta-lactamase) producing bacteria infection 11/06/2019    urine    Functional ovarian cysts 2008    rt ovary cyst x 2 yrs.     Headache(784.0)     migraines    History of blood transfusion     at birth   Pancho Stager History of kidney stones     History of PCOS     Hydrocephalus ([facility] [address]     Hyperlipidemia     Irritable bowel syndrome 2004    had colonoscopy about 6 yrs ago    Meningitis 45/3678    Neutrophilic leukocytosis     Nicotine dependence     PONV (postoperative nausea and vomiting)     very nauseated and sometimes wakes up with a Migraine--happened once after brain surgery    Primary osteoarthritis of left knee 07/01/2016    S/P cone biopsy of cervix 2004    Scoliosis 1990.s     (ventriculoperitoneal) shunt status 1982    hydrocephalus f/w Neurosurgeon at [facility]     (ventriculoperitoneal) shunt status     Wears glasses     reading         SURGICAL HISTORY       Past Surgical History:   Procedure Laterality Date    ABDOMEN SURGERY N/A 7/14/2021    REMOVAL OF ABDOMINAL WALL MASS performed by Jak Cheema MD at [address] [place] [address]  2003    appendicitis     SECTION      placenta previa    CHOLECYSTECTOMY      COLONOSCOPY  2004    COLPOSCOPY      CSF SHUNT      replaced at age 17    CYSTOSCOPY      stone removal    HEMORRHOID SURGERY      HERNIA REPAIR Bilateral     inguinal    HERNIA REPAIR      hiatal hernia    HYSTERECTOMY, TOTAL ABDOMINAL  2016    TAHBSO, adhesions/PCOS    KNEE ARTHROSCOPY Left 2015    medial meniscectomy, chondroplasty, plica resection    LITHOTRIPSY Bilateral 12/10/2019    OTHER SURGICAL HISTORY  10/19/2016    op lap    VENTRICULOPERITONEAL SHUNT      multiple revisions, most recent          CURRENT MEDICATIONS       Previous Medications    ALPRAZOLAM (XANAX) 0.5 MG TABLET    Take 0.5 mg by mouth daily as needed. CETIRIZINE (ZYRTEC) 10 MG TABLET    Take 1 tablet by mouth daily    HYOSCYAMINE SULFATE SL (LEVSIN/SL) 0.125 MG SUBL    Place 0.125 mg under the tongue every 4 hours as needed (abdominal pain)    LISDEXAMFETAMINE (VYVANSE) 30 MG CAPSULE    Take 30 mg by mouth every morning. ONDANSETRON (ZOFRAN ODT) 4 MG DISINTEGRATING TABLET    Take 1-2 tablets by mouth every 12 hours as needed for Nausea May Sub regular tablet (non-ODT) if insurance does not cover ODT.     SIMVASTATIN (ZOCOR) 10 MG TABLET    Take 1 tablet by mouth nightly    TAMSULOSIN (FLOMAX) 0.4 MG CAPSULE    Take 1 capsule by mouth daily       ALLERGIES     Bee venom, Bentyl [dicyclomine hcl], Mushroom extract complex, Sulfa antibiotics, Vancomycin, Adhesive tape, Toradol [ketorolac tromethamine], Acetaminophen, Ceftaroline, Daptomycin, Dicyclomine, Fioricet-codeine [butalbital-apap-caff-cod], Levaquin [levofloxacin], Methocarbamol, Prochlorperazine, Tazobactam, and Zosyn [piperacillin sod-tazobactam so]    FAMILY HISTORY       Family History   Problem Relation Age of Onset    High Blood Pressure Mother     Diabetes Mother     High Cholesterol Mother     Depression Mother     Diabetes Maternal Grandmother     High Blood Pressure Maternal Grandmother     High Cholesterol Maternal Grandmother     Heart Disease Maternal Grandfather     High Blood Pressure Maternal Grandfather     Heart Disease Paternal Grandmother     Stroke Paternal Grandfather     Depression Sister     Cirrhosis Father     Rheum Arthritis Neg Hx     Osteoarthritis Neg Hx     Asthma Neg Hx     Breast Cancer Neg Hx     Cancer Neg Hx     Heart Failure Neg Hx     Hypertension Neg Hx     Migraines Neg Hx     Ovarian Cancer Neg Hx     Rashes/Skin Problems Neg Hx     Seizures Neg Hx     Thyroid Disease Neg Hx           SOCIAL HISTORY       Social History     Socioeconomic History    Marital status: Single     Spouse name: Not on file    Number of children: Not on file    Years of education: Not on file    Highest education level: Not on file   Occupational History    Occupation: disability, SSI    Tobacco Use    Smoking status: Current Some Day Smoker     Packs/day: 0.50     Years: 18.00     Pack years: 9.00     Types: Cigarettes    Smokeless tobacco: Never Used   Vaping Use    Vaping Use: Never used   Substance and Sexual Activity    Alcohol use: No     Alcohol/week: 0.0 standard drinks    Drug use: Never    Sexual activity: Not Currently     Partners: Male   Other Topics Concern    Not on file   Social History Narrative    Not on file     Social Determinants of Health     Financial Resource Strain: Low Risk     Difficulty of Paying Living Expenses: Not hard at all   Food Insecurity: No Food Insecurity    Worried About Running Out of Food in the Last Year: Never true    [place] of Food in the Last Year: Never true   Transportation Needs:     Lack of Transportation (Medical):      Lack of Transportation (Non-Medical):    Physical Activity:     Days of Exercise per Week:     Minutes of Exercise per Session:    Stress:     Feeling of Stress :    Social Connections:     Frequency of Communication with Friends and Family:     Frequency of Social Gatherings with Friends and Family:     Attends Druze Services:     Active Member of Clubs or Organizations:     Attends Club or Organization Meetings:     Marital Status:    Intimate Partner Violence:     Fear of Current or Ex-Partner:     Emotionally Abused:     Physically Abused:     Sexually Abused:          PHYSICAL EXAM    (up to 7 for level 4, 8 or more for level 5)     ED Triage Vitals   BP Temp Temp src Pulse Resp SpO2 Height Weight   10/06/21 1105 10/06/21 1306 -- 10/06/21 1105 10/06/21 1105 10/06/21 1105 10/06/21 1105 10/06/21 1105   (!) 149/90 98.9 °F (37.2 °C)  111 18 99 % 4' 11\" (1.499 m) 159 lb 2.8 oz (72.2 kg)       Physical Exam  Vitals and nursing note reviewed. Constitutional:       Appearance: She is well-developed. She is not diaphoretic. HENT:      Head: Normocephalic and atraumatic. Right Ear: External ear normal.      Left Ear: External ear normal.   Eyes:      Conjunctiva/sclera: Conjunctivae normal.   Neck:      Vascular: No JVD. Trachea: No tracheal deviation. Cardiovascular:      Rate and Rhythm: Regular rhythm. Tachycardia present. Pulmonary:      Effort: Pulmonary effort is normal. No respiratory distress. Breath sounds: Normal breath sounds. No wheezing. Abdominal:      General: There is no distension. Palpations: Abdomen is soft. Tenderness: There is abdominal tenderness. There is no guarding or rebound. Comments: Mild tenderness to right upper quadrant. No rebound, guarding, peritoneal signs. Musculoskeletal:         General: No tenderness or deformity. Normal range of motion. Cervical back: Normal range of motion and neck supple. No rigidity or tenderness. Comments: No lower extremity swelling, tenderness, or palpable cord. Negative Valeria test bilaterally. Skin:     General: Skin is warm and dry. Neurological:      General: No focal deficit present.       Mental Status: She is alert and oriented to person, place, and time. Cranial Nerves: No cranial nerve deficit. Comments: Normal speech. Normal status. No focal neurologic deficits. Sensation intact in all 4 extremities. Patient ambulatory on own power. DIAGNOSTIC RESULTS         RADIOLOGY:     Interpretation per the Radiologist below, if available at the time of this note:     [address] (<4 VIEWS)   Final Result   Intact  shunt catheter extending into the low pelvis, similar to the   previous study. No evidence of catheter kinking or fracture. CT ABDOMEN PELVIS W IV CONTRAST Additional Contrast? None   Final Result   No CT evidence of acute intra-abdominal pathology. Unchanged 3 mm nonobstructing right renal calculus. ED BEDSIDE ULTRASOUND:   Performed by ED Physician - none    LABS:  Labs Reviewed   CBC WITH AUTO DIFFERENTIAL - Abnormal; Notable for the following components:       Result Value    WBC 13.2 (*)     Neutrophils Absolute 11.6 (*)     All other components within normal limits    Narrative:     Performed at:  [facility]  [address] [place], [place]   Phone [phone]   COMPREHENSIVE METABOLIC PANEL W/ REFLEX TO MG FOR LOW K - Abnormal; Notable for the following components:    Glucose 221 (*)     Alkaline Phosphatase 153 (*)     ALT 83 (*)     AST 12 (*)     All other components within normal limits    Narrative:     Performed at:  [facility]  [address] [place], [place]   Phone [phone]   URINE RT REFLEX TO CULTURE    Narrative:     Performed at:  [facility]  [address] [place], [place]   Phone [phone]       All otherlabs were within normal range or not returned as of this dictation.     EMERGENCY DEPARTMENT COURSE and DIFFERENTIAL DIAGNOSIS/MDM:   Vitals:    Vitals:    10/06/21 1105 10/06/21 1306 10/06/21 1600   BP: (!) 149/90 (!) 148/85 (!) 145/81   Pulse: 111 106 91   Resp: 18 17 16   Temp:  98.9 °F (37.2 °C) 98.5 °F (36.9 °C)   SpO2: 99%     Weight: 159 lb 2.8 oz (72.2 kg)     Height: 4' 11\" (1.499 m)           MDM  Patient tachycardic on arrival however heart rate normalized to 91 after IV fluids and pain medication. Her white blood cell count is mildly elevated at 13.2 but laboratory valuation otherwise unremarkable except for mild hyperglycemia. Shunt series x-rays do not show any signs of malposition of the shunt and CT abdomen pelvis is unremarkable. I have called [facility] and spoken to the neurosurgery NP on call who reports she is very familiar with the patient. We have discussed the patient's labs including elevated white blood cell count. She reports the patient is appropriate for outpatient follow-up. Strict ER return precautions are given to the patient. Patient expresses understanding and agreement with this plan and is discharged home. CONSULTS:  IP CONSULT TO NEUROSURGERY      Procedures    FINAL IMPRESSION      1. Generalized abdominal pain    2. Leukocytosis, unspecified type          DISPOSITION/PLAN   DISPOSITION Decision To Discharge 10/06/2021 04:21:06 PM      PATIENT REFERRED TO:  Maliha Taylor MD  [address]     In 2 days      [place] [address]  [phone]    If symptoms worsen      (Please note that portions of this note were completed with a voice recognition program.  Efforts were made to edit the dictations but occasionally words aremis-transcribed. )    Henrique Kwok MD (electronically signed)  Attending Emergency Physician           Henrique Kwok MD  10/06/21 7237

## 2021-10-07 ENCOUNTER — TELEPHONE (OUTPATIENT)
Dept: PRIMARY CARE CLINIC | Age: 41
End: 2021-10-07

## 2021-10-07 ENCOUNTER — APPOINTMENT (OUTPATIENT)
Dept: CT IMAGING | Age: 41
End: 2021-10-07
Payer: COMMERCIAL

## 2021-10-07 ENCOUNTER — HOSPITAL ENCOUNTER (EMERGENCY)
Age: 41
Discharge: HOME | End: 2021-10-07
Payer: COMMERCIAL

## 2021-10-07 ENCOUNTER — HOSPITAL ENCOUNTER (EMERGENCY)
Age: 41
Discharge: LEFT BEFORE DISCHARGE | End: 2021-10-07
Attending: EMERGENCY MEDICINE
Payer: COMMERCIAL

## 2021-10-07 VITALS
SYSTOLIC BLOOD PRESSURE: 164 MMHG | RESPIRATION RATE: 19 BRPM | OXYGEN SATURATION: 100 % | HEART RATE: 97 BPM | DIASTOLIC BLOOD PRESSURE: 74 MMHG | TEMPERATURE: 98.5 F

## 2021-10-07 VITALS
TEMPERATURE: 98.4 F | OXYGEN SATURATION: 99 % | RESPIRATION RATE: 16 BRPM | DIASTOLIC BLOOD PRESSURE: 79 MMHG | SYSTOLIC BLOOD PRESSURE: 119 MMHG | HEART RATE: 89 BPM

## 2021-10-07 DIAGNOSIS — R55 SYNCOPE, UNSPECIFIED SYNCOPE TYPE: ICD-10-CM

## 2021-10-07 DIAGNOSIS — G89.29 OTHER CHRONIC PAIN: Primary | ICD-10-CM

## 2021-10-07 DIAGNOSIS — R51.9 NONINTRACTABLE HEADACHE, UNSPECIFIED CHRONICITY PATTERN, UNSPECIFIED HEADACHE TYPE: Primary | ICD-10-CM

## 2021-10-07 DIAGNOSIS — R10.9 ABDOMINAL PAIN, UNSPECIFIED ABDOMINAL LOCATION: ICD-10-CM

## 2021-10-07 LAB
EKG ATRIAL RATE: 97 BPM
EKG DIAGNOSIS: NORMAL
EKG P AXIS: 41 DEGREES
EKG P-R INTERVAL: 124 MS
EKG Q-T INTERVAL: 388 MS
EKG QRS DURATION: 86 MS
EKG QTC CALCULATION (BAZETT): 492 MS
EKG R AXIS: -3 DEGREES
EKG T AXIS: 22 DEGREES
EKG VENTRICULAR RATE: 97 BPM

## 2021-10-07 PROCEDURE — 99283 EMERGENCY DEPT VISIT LOW MDM: CPT

## 2021-10-07 PROCEDURE — 70450 CT HEAD/BRAIN W/O DYE: CPT

## 2021-10-07 PROCEDURE — 72125 CT NECK SPINE W/O DYE: CPT

## 2021-10-07 PROCEDURE — 6370000000 HC RX 637 (ALT 250 FOR IP): Performed by: PHYSICIAN ASSISTANT

## 2021-10-07 PROCEDURE — 6370000000 HC RX 637 (ALT 250 FOR IP): Performed by: EMERGENCY MEDICINE

## 2021-10-07 PROCEDURE — 93005 ELECTROCARDIOGRAM TRACING: CPT | Performed by: PHYSICIAN ASSISTANT

## 2021-10-07 PROCEDURE — 93010 ELECTROCARDIOGRAM REPORT: CPT | Performed by: INTERNAL MEDICINE

## 2021-10-07 PROCEDURE — 99284 EMERGENCY DEPT VISIT MOD MDM: CPT

## 2021-10-07 RX ORDER — TRAMADOL HYDROCHLORIDE 50 MG/1
50 TABLET ORAL ONCE
Status: COMPLETED | OUTPATIENT
Start: 2021-10-07 | End: 2021-10-07

## 2021-10-07 RX ORDER — TRAMADOL HYDROCHLORIDE 50 MG/1
50 TABLET ORAL EVERY 6 HOURS PRN
Qty: 12 TABLET | Refills: 0 | Status: SHIPPED | OUTPATIENT
Start: 2021-10-07 | End: 2021-10-10

## 2021-10-07 RX ORDER — ONDANSETRON 4 MG/1
4 TABLET, ORALLY DISINTEGRATING ORAL ONCE
Status: COMPLETED | OUTPATIENT
Start: 2021-10-07 | End: 2021-10-07

## 2021-10-07 RX ADMIN — TRAMADOL HYDROCHLORIDE 50 MG: 50 TABLET, FILM COATED ORAL at 17:53

## 2021-10-07 RX ADMIN — ONDANSETRON 4 MG: 4 TABLET, ORALLY DISINTEGRATING ORAL at 16:12

## 2021-10-07 ASSESSMENT — ENCOUNTER SYMPTOMS
COUGH: 0
EYES NEGATIVE: 1
CONSTIPATION: 0
SHORTNESS OF BREATH: 0
CHEST TIGHTNESS: 0
EYE ITCHING: 0
ABDOMINAL PAIN: 1
COLOR CHANGE: 0
NAUSEA: 1
ABDOMINAL PAIN: 1
SHORTNESS OF BREATH: 0
VOMITING: 0
GASTROINTESTINAL NEGATIVE: 1
EYE DISCHARGE: 0
EYES NEGATIVE: 1
VOMITING: 1
RESPIRATORY NEGATIVE: 1

## 2021-10-07 ASSESSMENT — PAIN SCALES - GENERAL: PAINLEVEL_OUTOF10: 0

## 2021-10-07 NOTE — ED NOTES
Sendy Meléndez is a 46 y.o. female presents in the ED today via EMS. Pt was seen at [facility] ER earlier today and then called her neurologist help line as soon as she got home and the nurse on the help line instructed her to go to the ED. Pt stated that she was instructed to go [facility] and that her neurologist called and told them she was coming. Pt then called 911 and when they arrived here pt was angry and upset because [facility] would not take her to [facility].          Taniya De La Cruz RN  10/07/21 6034

## 2021-10-07 NOTE — ED NOTES
Patient presents to the ED, [facility] morning of [facility]6. She states they did a work up on her, and stated that she had a slight elevated white blood cell count but otherwise was unremarkable and was able to be discharged. She states they discharged her home with tramadol, which she was unhappy with because \"they had given me two rounds of dilaudid already\". Patient states her pain did not cease, and she passed out multiple times at home and had fevers. She states when she passed out her family called [address] arrived. She stated that she wanted to come to [facility], as she sees neurology here. She states that they refused to take her to [facility], and only agreed to take her to [facility]. Patient states that they were rude to her at [facility], and patient subsequently signed out estefany GUTIÉRREZ 4 hours ago. Patient states she went home, and asked her sister to bring her here, where she \"passed out\" again in the car. Patient denies no other \"passing out\" since her arrival. Patient was brought back to room, VSS at this time. No fever noted at this time.       Eloy Paredes RN  10/07/21 8863

## 2021-10-07 NOTE — ED PROVIDER NOTES
[address] [place]          ATTENDING PHYSICIAN NOTE       Date of evaluation: 10/7/2021    Chief Complaint     Headache      History of Present Illness     Vincent Drummond is a 46 y.o. female who presents complaining of headache. Patient has a history of  shunt and states she is having headaches and abdominal pain. She was seen in an outside emergency department yesterday and had unremarkable work-up. She states she was discharged home and when she arrived home she started having nausea and vomiting and worsening pain. She called 911 and was taken to a another emergency department but was unhappy with this so left [address] and came to this facility. Patient states that she had 2 episodes where she passed out associated with this. She denies any fevers or chills. She denies any cough or shortness of breath. Review of Systems     Review of Systems   Constitutional: Negative. HENT: Negative. Eyes: Negative. Respiratory: Negative. Cardiovascular: Negative. Gastrointestinal: Negative. Genitourinary: Negative. Musculoskeletal: Negative. Neurological: Positive for syncope and headaches. All other systems reviewed and are negative.       Past Medical, Surgical, Family, and Social History     She has a past medical history of ADHD (attention deficit hyperactivity disorder), Depression with anxiety, Diabetes mellitus ([address] Difficult intubation, Encounter for imaging to screen for metal prior to MRI, ESBL (extended spectrum beta-lactamase) producing bacteria infection, Functional ovarian cysts, Headache(784.0), History of blood transfusion, History of kidney stones, History of PCOS, Hydrocephalus ([address] Hyperlipidemia, Irritable bowel syndrome, Meningitis, Neutrophilic leukocytosis, Nicotine dependence, PONV (postoperative nausea and vomiting), Primary osteoarthritis of left knee, S/P cone biopsy of cervix, Scoliosis,  (ventriculoperitoneal) shunt status,  (ventriculoperitoneal) shunt status, and Wears glasses. She has a past surgical history that includes Appendectomy (); Colonoscopy (); Colposcopy ();  section (); csf shunt; Cystoscopy; Knee arthroscopy (Left, 2015); Cholecystectomy; other surgical history (10/19/2016); Ventriculoperitoneal shunt; Hysterectomy, total abdominal (2016); Lithotripsy (Bilateral, 12/10/2019); hernia repair (Bilateral, ); hernia repair (); Hemorrhoid surgery; and Abdomen surgery (N/A, 2021). Her family history includes Cirrhosis in her father; Depression in her mother and sister; Diabetes in her maternal grandmother and mother; Heart Disease in her maternal grandfather and paternal grandmother; High Blood Pressure in her maternal grandfather, maternal grandmother, and mother; High Cholesterol in her maternal grandmother and mother; Stroke in her paternal grandfather. She reports that she has been smoking cigarettes. She has a 9.00 pack-year smoking history. She has never used smokeless tobacco. She reports that she does not drink alcohol and does not use drugs. Medications     Discharge Medication List as of 10/7/2021  4:37 AM      CONTINUE these medications which have NOT CHANGED    Details   cetirizine (ZYRTEC) 10 MG tablet Take 1 tablet by mouth daily, Disp-30 tablet, R-0Normal      Hyoscyamine Sulfate SL (LEVSIN/SL) 0.125 MG SUBL Place 0.125 mg under the tongue every 4 hours as needed (abdominal pain), Disp-30 each, R-1Normal      ondansetron (ZOFRAN ODT) 4 MG disintegrating tablet Take 1-2 tablets by mouth every 12 hours as needed for Nausea May Sub regular tablet (non-ODT) if insurance does not cover ODT., Disp-20 tablet, R-0Normal      lisdexamfetamine (VYVANSE) 30 MG capsule Take 30 mg by mouth every morning. Historical Med      tamsulosin (FLOMAX) 0.4 MG capsule Take 1 capsule by mouth daily, Disp-30 capsule, R-0Normal      ALPRAZolam Daryel Cui) 0.5 MG tablet Take 0.5 mg by mouth daily as needed. Historical Med      simvastatin (ZOCOR) 10 MG tablet Take 1 tablet by mouth nightly, Disp-90 tablet, R-1Normal             Allergies     She is allergic to bee venom, bentyl [dicyclomine hcl], mushroom extract complex, sulfa antibiotics, vancomycin, adhesive tape, toradol [ketorolac tromethamine], acetaminophen, ceftaroline, daptomycin, dicyclomine, fioricet-codeine [butalbital-apap-caff-cod], levaquin [levofloxacin], methocarbamol, prochlorperazine, tazobactam, and zosyn [piperacillin sod-tazobactam so]. Physical Exam     INITIAL VITALS: BP: (!) 164/74, Temp: 98.5 °F (36.9 °C), Pulse: 97, Resp: 19, SpO2: 100 %   Physical Exam  Vitals and nursing note reviewed. Constitutional:       General: She is not in acute distress. Neurological:      Mental Status: She is alert. Patient refused physical exam when she learned she would be only be having an EKG performed and nausea medicine given. Diagnostic Results     RECENT VITALS:  BP: (!) 164/74,Temp: 98.5 °F (36.9 °C), Pulse: 97, Resp: 19, SpO2: 100 %     Procedures     N/A    ED Course     Nursing Notes, Past Medical Hx, Past Surgical Hx, Social Hx,Allergies, and Family Hx were reviewed. patient was given the following medications:  No orders of the defined types were placed in this encounter. CONSULTS:  None    MEDICAL DECISIONMAKING / ASSESSMENT / Emmolly Galicia is a 46 y.o. female who presents complaining of headache, abdominal pain, and syncopal episodes. In review of records, patient's been seen in multiple emergency departments multiple times for complaints of abdominal pain and headache. She is known to have stable CT scans, most recently 1 week ago, and had abdominal CT performed yesterday that was unremarkable.   Patient did complain of having 2 syncopal episodes so I did state I would obtain an EKG to look for any evidence of arrhythmia but otherwise she would not need any additional testing since she is had such significant testing within the last few days. I also stated the patient that I would prescribe her nausea medicine. Patient became upset with this, stating \"I did not flecking wait 6 hours to only get an EKG. \"  Patient stated that she did not want to stay in the emergency department. Patient had no condition to preclude her from leaving on her own volition and the patient eloped from the emergency department prior to testing being obtained. Clinical Impression     1. Nonintractable headache, unspecified chronicity pattern, unspecified headache type    2. Abdominal pain, unspecified abdominal location    3. Syncope, unspecified syncope type        Disposition     PATIENT REFERRED TO:  No follow-up provider specified.     DISCHARGE MEDICATIONS:  Discharge Medication List as of 10/7/2021  4:37 AM          DISPOSITION Eloped - Left Before Treatment Complete 10/07/2021 04:26:52 AM        Fina Reyes MD  10/07/21 7937

## 2021-10-07 NOTE — ED PROVIDER NOTES
[address] [facility] [address]  Dept: [phone]  Loc: [phone]  eMERGENCYdEPARTMENT eNCOUnter      Pt Name: Nanci Chavez  MRN: [id number]  [place] 1980  Date of evaluation: 10/7/2021  Provider:Symone Chowdhury PA-C    CHIEF COMPLAINT       Chief Complaint   Patient presents with    Loss of Consciousness     Pt states she is still passing out, she has not completed recommended follow ups       CRITICAL CARE TIME   Total Critical Care time was 0 minutes, excluding separately reportable procedures. There was a high probability of clinically significant/life threatening deterioration in the patient's condition which required my urgentintervention. HISTORY OF PRESENT ILLNESS  (Location/Symptom, Timing/Onset, Context/Setting, Quality, Duration,Modifying Factors, Severity.)   Nanci Chavez is a 46 y.o. female who presents to the emergency department by private vehicle complaining of head injury. Patient has a history of  shunt states she has had head pain, nausea, vomiting over the past couple days. She was seen evaluated in the ED yesterday and had a  shunt series and CT abdomen pelvis which showed no complications. Last night after discharge she had a syncopal episode. She went to [facility] ED and left AMA. After that ED evaluation she went to [facility], [facility]. where they planned on obtaining an EKG given syncope. Patient states she was unhappy with care and left prior to completion of evaluation. With syncopal episode she did hit her head on concrete. She has pain to the left side of her head and back of her neck. States given pain, nausea and vomiting she overall does not feel well. Patient here for reevaluation. She denies any vision changes, extremity numbness/tingling/weakness. No new symptoms at this time. Nursing Notes were reviewedand agreed with or any disagreements were addressed in the HPI.     REVIEW OF SYSTEMS    (2-9 systems for level 4, 10 or more for level 5)     Review of Systems   Constitutional: Negative for chills and fever. HENT: Negative. Eyes: Negative. Respiratory: Negative for chest tightness and shortness of breath. Cardiovascular: Negative. Gastrointestinal: Positive for abdominal pain, nausea and vomiting. Genitourinary: Negative. Musculoskeletal: Negative for arthralgias and myalgias. Skin: Negative. Neurological: Positive for light-headedness and headaches. Psychiatric/Behavioral: Negative for behavioral problems and confusion. Except as noted above the remainder of the review of systems was reviewed and negative. PAST MEDICAL HISTORY         Diagnosis Date    ADHD (attention deficit hyperactivity disorder) 82    Depression with anxiety     Diabetes mellitus ([facility] [address]     pre-diabetes    Difficult intubation     Encounter for imaging to screen for metal prior to MRI 06/01/2021    MRI Conditional Medtronic Non-Programmable shunt model#11017 implanted 10/30/2020 at [facility]. Normal Mode. 1.5T or 3.0T.    ESBL (extended spectrum beta-lactamase) producing bacteria infection 11/06/2019    urine    Functional ovarian cysts 2008    rt ovary cyst x 2 yrs.     Headache(784.0)     migraines    History of blood transfusion     at birth   Dilcia Coral History of kidney stones     History of PCOS     Hydrocephalus ([facility] [address]     Hyperlipidemia     Irritable bowel syndrome 2004    had colonoscopy about 6 yrs ago    Meningitis 51/8620    Neutrophilic leukocytosis     Nicotine dependence     PONV (postoperative nausea and vomiting)     very nauseated and sometimes wakes up with a Migraine--happened once after brain surgery    Primary osteoarthritis of left knee 07/01/2016    S/P cone biopsy of cervix 2004    Scoliosis 1990.s     (ventriculoperitoneal) shunt status 1982    hydrocephalus f/w Neurosurgeon at [facility]     (ventriculoperitoneal) shunt status     Neg Hx     Ovarian Cancer Neg Hx     Rashes/Skin Problems Neg Hx     Seizures Neg Hx     Thyroid Disease Neg Hx      Family Status   Relation Name Status    Mother  Alive    MGM  Alive        copd, goiter    MGF      PGM  Alive        alcoholic    PGF  Alive   Lavona Euler Sister  Alive        scoliosis    Father   at age 50        cirhosis liver    Sister  Alive        scoliosis    Brother  Alive        adhd, mild autism    Neg Hx  (Not Specified)        SOCIAL HISTORY      reports that she has been smoking cigarettes. She has a 9.00 pack-year smoking history. She has never used smokeless tobacco. She reports that she does not drink alcohol and does not use drugs. PHYSICAL EXAM    (up to 7 for level 4, 8 or more for level 5)     ED Triage Vitals [10/07/21 1544]   Enc Vitals Group      /79      Pulse 89      Resp 16      Temp 98.4 °F (36.9 °C)      Temp Source Oral      SpO2 99 %      Weight       Height       Head Circumference       Peak Flow       Pain Score       Pain Loc       Pain Edu? Excl. in [address]? Physical Exam  Vitals reviewed. Constitutional:       Appearance: Normal appearance. HENT:      Head: Normocephalic and atraumatic. Neck:      Comments: Midline spinous process tenderness at C3, and occipital base  Cardiovascular:      Rate and Rhythm: Normal rate and regular rhythm. Pulmonary:      Effort: Pulmonary effort is normal. No respiratory distress. Abdominal:      Palpations: Abdomen is soft. Tenderness: There is no guarding or rebound. Musculoskeletal:         General: Normal range of motion. Cervical back: Normal range of motion and neck supple. Skin:     General: Skin is warm. Neurological:      General: No focal deficit present. Mental Status: She is alert and oriented to person, place, and time.    Psychiatric:         Mood and Affect: Mood normal.      Comments: Tearful           DIAGNOSTIC RESULTS     EKG: All EKG's are interpreted by the Emergency Department Physician who either signs or Co-signs this chart in the absence of a cardiologist.    RADIOLOGY:   Non-plain film images such as CT, Ultrasound and MRI are read by the radiologist. Plain radiographic images are visualized and preliminarilyinterpreted by the emergency physician with the below findings:    Interpretation per the Radiologist below,if available at the time of this note:    CT HEAD WO CONTRAST   Final Result   No acute intracranial abnormality. CT CERVICAL SPINE WO CONTRAST   Final Result   1. No acute fracture. No listhesis. 2. Other findings as described. LABS:  Labs Reviewed - No data to display    All other labs were within normal range or not returned as of this dictation. EMERGENCY DEPARTMENT COURSE and DIFFERENTIAL DIAGNOSIS/MDM:   Vitals:    Vitals:    10/07/21 1544   BP: 119/79   Pulse: 89   Resp: 16   Temp: 98.4 °F (36.9 °C)   TempSrc: Oral   SpO2: 99%       MDM    Patient presents ED with HPI noted above. Vital signs reviewed all within normal limits. EKG obtained showed normal sinus rhythm, no significant ST elevation or depression, no STEMI. Given head injury last night CTA without contrast obtained. CT showed no acute intercranial abnormality. She did have mild midline tenderness on exam.  CT cervical spine without contrast obtained showed no acute fracture. Patient freely moving neck without difficulty. Patient given Zofran ODT in the ED. No active emesis throughout duration of stay in the ED. Patient has been seen evaluated 3 times in the past 24 hours. X-ray shunt and CT abdomen pelvis yesterday both unremarkable. No further emergent imaging or work-up indicated in the ED at this time. Patient seen and evaluated with my attending physician, Dr. Mary Connolly. Patient to follow-up with neurosurgeon as outpatient as scheduled. She was discharged home in stable condition.     The patient tolerated their visit well. They were seen and evaluated by the attending physician, Dr. Monet Dodson who agreed with the assessment and plan. The patient and / or the family were informed of the results of any tests, a time was given to answer questions, a plan was proposed and they agreed with plan. CONSULTS:  None    PROCEDURES:  Procedures    FINAL IMPRESSION      1. Other chronic pain          DISPOSITION/PLAN   [unfilled]    PATIENT REFERRED TO:  Yasmany Tian MD  [address] [facility]  [phone]    On 10/11/2021        DISCHARGE MEDICATIONS:  Discharge Medication List as of 10/7/2021  5:46 PM      START taking these medications    Details   traMADol (ULTRAM) 50 MG tablet Take 1 tablet by mouth every 6 hours as needed for Pain for up to 3 days. Intended supply: 3 days.  Take lowest dose possible to manage pain, Disp-12 tablet, R-0Print             (Please note that portions of this note were completed with a voice recognition program.  Efforts were made to edit the dictations but occasionally words are mis-transcribed.)    [address] [facility]          [address] [place], [place]  10/07/21 4306

## 2021-10-07 NOTE — TELEPHONE ENCOUNTER
Pt was seen in the ED today see note below, pt has past medical history that includes  shunt since childhood, now followed by Dr. Harman Mcqueen of [address] [place] [address] neurosurgery. she is c/o uncontrolled Vomiting, unable to keep anything down unable to take tylenol/ibuprofen   Fever  101.7, Severe headache, neck pain, uncontrollable crying. Advised pt these are considered emergent/red flags ok to return to ED for further evaluation and treatment. Pt does not have transportation may have to call a squad. Pt plans to go to [facility]. Note Dr. Jenn Sandra 10/06/21  \"MDM  Patient tachycardic on arrival however heart rate normalized to 91 after IV fluids and pain medication. Her white blood cell count is mildly elevated at 13.2 but laboratory valuation otherwise unremarkable except for mild hyperglycemia. Shunt series x-rays do not show any signs of malposition of the shunt and CT abdomen pelvis is unremarkable. I have called [facility] neurosurgery and spoken to the neurosurgery NP on call who reports she is very familiar with the patient. We have discussed the patient's labs including elevated white blood cell count. She reports the patient is appropriate for outpatient follow-up. Strict ER return precautions are given to the patient. Patient expresses understanding and agreement with this plan and is discharged home. \"

## 2021-10-07 NOTE — ED NOTES
After MD Brook Dow left the room, patient left AMA right behind him.       Alexia Romano RN  10/07/21 9573

## 2021-10-07 NOTE — TELEPHONE ENCOUNTER
Pt called and is still in pain, and vomiting. She wanted me to let you know that she is going to go to [facility] to be evaluated.

## 2021-10-07 NOTE — ED TRIAGE NOTES
Patient presents to ED, recently discharged from [facility], states she thinks that her \"shunt is causing an allergic reaction\".

## 2021-10-07 NOTE — ED NOTES
Pt then decided to leave [facility] and just walked out after speaking to Dr. Vickie Clifton, RN  10/07/21 8616

## 2021-10-07 NOTE — ED PROVIDER NOTES
[facility]      Pt Name: Rory Ruano  MRN: [id number]  [place] 1980  Date of evaluation: 10/6/2021  Provider: William Perdomo MD    CHIEF COMPLAINT       Chief Complaint   Patient presents with    Headache     on going headache and abd pain has been seen multiple times recently pt's neurologist told her to go to [facility] EMS refused to take her to [facility] is a 46 y.o. female who presents to the emergency department with abdominal pain and headache. Patient was just seen at [facility] emergency room. Endorses abdominal pain and headache that has been going on for years. Thinks it is associated with her  shunt. Has had multiple CTs and shunt series. Was seen at [facility] emergency room and neurosurgery was consulted and they recommended patient follow-up outpatient. Patient endorses increasing pain 9 out of 10 achy nature. Worse with movement. Better with rest.  No other associated symptoms. During ER stay patient became argumentative and frustrated and decided to leave [address] Nursing Notes were reviewed. Including nursing noted for FM, Surgical History, Past Medical History, Social History, vitals, and allergies; agree with all. REVIEW OF SYSTEMS       Review of Systems   Constitutional: Negative for diaphoresis and unexpected weight change. HENT: Negative for congestion and dental problem. Eyes: Negative for discharge and itching. Respiratory: Negative for cough and shortness of breath. Cardiovascular: Negative for chest pain and leg swelling. Gastrointestinal: Positive for abdominal pain. Negative for constipation and vomiting. Endocrine: Negative for cold intolerance and heat intolerance. Genitourinary: Negative for vaginal bleeding, vaginal discharge and vaginal pain. Musculoskeletal: Negative for neck pain and neck stiffness.    Skin: Negative for color change and pallor. Neurological: Positive for headaches. Negative for tremors and weakness. Psychiatric/Behavioral: Negative for agitation and behavioral problems. Except as noted above the remainder of the review of systems was reviewed and negative. PAST MEDICAL HISTORY     Past Medical History:   Diagnosis Date    ADHD (attention deficit hyperactivity disorder) 82    Depression with anxiety     Diabetes mellitus ([facility] [address]     pre-diabetes    Difficult intubation     Encounter for imaging to screen for metal prior to MRI 2021    MRI Conditional Medtronic Non-Programmable shunt model#[id number] implanted 10/30/2020 at [facility]. Normal Mode. 1.5T or 3.0T.    ESBL (extended spectrum beta-lactamase) producing bacteria infection 2019    urine    Functional ovarian cysts 2008    rt ovary cyst x 2 yrs.     Headache(784.0)     migraines    History of blood transfusion     at birth   [employer] History of kidney stones     History of PCOS     Hydrocephalus ([facility] [address]     Hyperlipidemia     Irritable bowel syndrome 2004    had colonoscopy about 6 yrs ago    Meningitis     Neutrophilic leukocytosis     Nicotine dependence     PONV (postoperative nausea and vomiting)     very nauseated and sometimes wakes up with a Migraine--happened once after brain surgery    Primary osteoarthritis of left knee 2016    S/P cone biopsy of cervix 2004    Scoliosis 1990.s     (ventriculoperitoneal) shunt status     hydrocephalus f/w Neurosurgeon at [facility]     (ventriculoperitoneal) shunt status     Wears glasses     reading       SURGICAL HISTORY       Past Surgical History:   Procedure Laterality Date    ABDOMEN SURGERY N/A 2021    REMOVAL OF ABDOMINAL WALL MASS performed by Jaret Jones MD at [facility]  2003    appendicitis     SECTION  2005    placenta previa    CHOLECYSTECTOMY      COLONOSCOPY  2004    COLPOSCOPY  2004  CSF SHUNT      replaced at age 17    CYSTOSCOPY      stone removal    HEMORRHOID SURGERY      HERNIA REPAIR Bilateral 1988    inguinal    HERNIA REPAIR  2015    hiatal hernia    HYSTERECTOMY, TOTAL ABDOMINAL  11/09/2016    TAHBSO, adhesions/PCOS    KNEE ARTHROSCOPY Left 1/7/2015    medial meniscectomy, chondroplasty, plica resection    LITHOTRIPSY Bilateral 12/10/2019    OTHER SURGICAL HISTORY  10/19/2016    op lap    VENTRICULOPERITONEAL SHUNT      multiple revisions, most recent 2015       CURRENT MEDICATIONS       Discharge Medication List as of 10/6/2021 11:37 PM      CONTINUE these medications which have NOT CHANGED    Details   cetirizine (ZYRTEC) 10 MG tablet Take 1 tablet by mouth daily, Disp-30 tablet, R-0Normal      Hyoscyamine Sulfate SL (LEVSIN/SL) 0.125 MG SUBL Place 0.125 mg under the tongue every 4 hours as needed (abdominal pain), Disp-30 each, R-1Normal      ondansetron (ZOFRAN ODT) 4 MG disintegrating tablet Take 1-2 tablets by mouth every 12 hours as needed for Nausea May Sub regular tablet (non-ODT) if insurance does not cover ODT., Disp-20 tablet, R-0Normal      lisdexamfetamine (VYVANSE) 30 MG capsule Take 30 mg by mouth every morning. Historical Med      tamsulosin (FLOMAX) 0.4 MG capsule Take 1 capsule by mouth daily, Disp-30 capsule, R-0Normal      ALPRAZolam (XANAX) 0.5 MG tablet Take 0.5 mg by mouth daily as needed.  Historical Med      simvastatin (ZOCOR) 10 MG tablet Take 1 tablet by mouth nightly, Disp-90 tablet, R-1Normal             ALLERGIES     Bee venom, Bentyl [dicyclomine hcl], Mushroom extract complex, Sulfa antibiotics, Vancomycin, Adhesive tape, Toradol [ketorolac tromethamine], Acetaminophen, Ceftaroline, Daptomycin, Dicyclomine, Fioricet-codeine [butalbital-apap-caff-cod], Levaquin [levofloxacin], Methocarbamol, Prochlorperazine, Tazobactam, and Zosyn [piperacillin sod-tazobactam so]    FAMILY HISTORY        Family History   Problem Relation Age of Onset    High Blood Pressure Mother     Diabetes Mother     High Cholesterol Mother     Depression Mother     Diabetes Maternal Grandmother     High Blood Pressure Maternal Grandmother     High Cholesterol Maternal Grandmother     Heart Disease Maternal Grandfather     High Blood Pressure Maternal Grandfather     Heart Disease Paternal Grandmother     Stroke Paternal Grandfather     Depression Sister     Cirrhosis Father     Rheum Arthritis Neg Hx     Osteoarthritis Neg Hx     Asthma Neg Hx     Breast Cancer Neg Hx     Cancer Neg Hx     Heart Failure Neg Hx     Hypertension Neg Hx     Migraines Neg Hx     Ovarian Cancer Neg Hx     Rashes/Skin Problems Neg Hx     Seizures Neg Hx     Thyroid Disease Neg Hx        SOCIAL HISTORY       Social History     Socioeconomic History    Marital status: Single     Spouse name: None    Number of children: None    Years of education: None    Highest education level: None   Occupational History    Occupation: disability, SSI    Tobacco Use    Smoking status: Current Some Day Smoker     Packs/day: 0.50     Years: 18.00     Pack years: 9.00     Types: Cigarettes    Smokeless tobacco: Never Used   Vaping Use    Vaping Use: Never used   Substance and Sexual Activity    Alcohol use: No     Alcohol/week: 0.0 standard drinks    Drug use: Never    Sexual activity: Not Currently     Partners: Male   Other Topics Concern    None   Social History Narrative    None     Social Determinants of Health     Financial Resource Strain: Low Risk     Difficulty of Paying Living Expenses: Not hard at all   Food Insecurity: No Food Insecurity    Worried About Running Out of Food in the Last Year: Never true    [place] of Food in the Last Year: Never true   Transportation Needs:     Lack of Transportation (Medical):      Lack of Transportation (Non-Medical):    Physical Activity:     Days of Exercise per Week:     Minutes of Exercise per Session:    Stress:     Feeling of Stress :    Social Connections:     Frequency of Communication with Friends and Family:     Frequency of Social Gatherings with Friends and Family:     Attends Jain Services:     Active Member of Clubs or Organizations:     Attends Club or Organization Meetings:     Marital Status:    Intimate Partner Violence:     Fear of Current or Ex-Partner:     Emotionally Abused:     Physically Abused:     Sexually Abused:        PHYSICAL EXAM       ED Triage Vitals [10/06/21 2221]   BP Temp Temp Source Pulse Resp SpO2 Height Weight   128/80 98.1 °F (36.7 °C) Oral 117 16 99 % 4' 11\" (1.499 m) 163 lb 2.3 oz (74 kg)       Physical Exam  Vitals and nursing note reviewed. Constitutional:       General: She is not in acute distress. Appearance: She is well-developed. She is not ill-appearing, toxic-appearing or diaphoretic. HENT:      Head: Normocephalic and atraumatic. Right Ear: External ear normal.      Left Ear: External ear normal.   Eyes:      General:         Right eye: No discharge. Left eye: No discharge. Conjunctiva/sclera: Conjunctivae normal.      Pupils: Pupils are equal, round, and reactive to light. Cardiovascular:      Rate and Rhythm: Regular rhythm. Tachycardia present. Heart sounds: No murmur heard. Pulmonary:      Effort: Pulmonary effort is normal. No respiratory distress. Breath sounds: Normal breath sounds. No wheezing or rales. Abdominal:      General: Bowel sounds are normal. There is no distension. Palpations: Abdomen is soft. There is no mass. Tenderness: There is no abdominal tenderness. There is no guarding or rebound. Genitourinary:     Comments: Deferred  Musculoskeletal:         General: No deformity. Normal range of motion. Cervical back: Normal range of motion and neck supple. Skin:     General: Skin is warm. Findings: No erythema or rash.    Neurological:      Mental Status: She is alert and oriented to person, place, and time. She is not disoriented. Cranial Nerves: No cranial nerve deficit. Motor: No atrophy or abnormal muscle tone. Coordination: Coordination normal.   Psychiatric:         Behavior: Behavior normal.         Thought Content: Thought content normal.         DIAGNOSTIC RESULTS     EKG: All EKG's are interpreted by the Emergency Department Physician who either signs or Co-signs this chart in the absence of acardiologist.    None    RADIOLOGY:   Non-plain film images such as CT, Ultrasoundand MRI are read by the radiologist. Plain radiographic images are visualized and preliminarily interpreted by the emergency physician with the below findings:    None    ED BEDSIDE ULTRASOUND:   Performed by ED Physician - none    LABS:  Labs Reviewed - No data to display    All other labs were withinnormal range or not returned as of this dictation. EMERGENCY DEPARTMENT COURSE and DIFFERENTIAL DIAGNOSIS/MDM:     PMH, Surgical Hx, FH, Social Hx reviewed by myself (ETOH usage, Tobacco usage, Drug usage reviewed by myself, no pertinent Hx)- No Pertinent Hx     Old records were reviewed by me     29-year-old female with abdominal pain and headache. Discussed getting further imaging and laboratory evaluation. Discussed calling her neurosurgery team as well as possibly admitting her to the hospital.  Patient became frustrated and decided she needed to leave. She did not give a answer on why she needed to leave. I told her she be leaving [address] She verbalized understanding. I discussed the importance of complying and adhering to medical advice. Nevertheless, pt continues to refuse the recommendation for admission and adamantly expresses a desire to leave at this time. Pt verbalized a clear understanding that this decision could possibly lead to serious consequences.   Pt accepts responsibility for any harm or any deterioration in their condition that could result from leaving against medical advice at this time. As a responsible adult, pt expresses a clear understanding and willingness to accept responsibility for this AMA decision. More importantly, pt understands that while this decision is final in the moment, they may return to the ED at anytime for any reason. The patient as decided to leave against medical advice. The patient is awake and alert, non-intoxicated, non-suicidal, nonpsychotic. There is no medical condition that prevents or inhibits their understanding of the risks/benefits of their decision. The patient understands the risks and benefits of their decision and has been given the opportunity to ask questions about their medical condition. CRITICAL CARE TIME   Total Critical Caretime was 39 minutes, excluding separately reportable procedures. There was a high probability of clinically significant/life threatening deterioration in the patient's condition which required my urgent intervention. PROCEDURES:  Unlessotherwise noted below, none    FINAL IMPRESSION      1.  Primary stabbing headache          DISPOSITION/PLAN   DISPOSITION [place] 10/06/2021 11:27:25 PM    PATIENT REFERRED TO:  FREDRICK Alcala - CNP  [address] [place] [address]  [phone]          [place] [phone]            DISCHARGE MEDICATIONS:  Discharge Medication List as of 10/6/2021 11:37 PM             (Please note that portions ofthis note were completed with a voice recognition program.  Efforts were made to edit the dictations but occasionally words are mis-transcribed.)    Ana Velarde MD(electronically signed)  Attending Emergency Physician          Ana Velarde MD  10/07/21 [id number]

## 2021-10-07 NOTE — TELEPHONE ENCOUNTER
To place referral to rheum, I am going to add some blood work as rheum likes to have that before the appointment. Pt can go to any [facility] to have completed. Once completed, can place referral to rheum. Also, please schedule pt an appt with me in office so we can discuss symptoms, medications and possibly other referrals.

## 2021-10-07 NOTE — TELEPHONE ENCOUNTER
Petrona called and stated she was taken by squad to [facility], please see previous note  - she was very upset because she wanted to go to [facility], and was not told until they had her in the parking lot  - pt questioned if she should take an uber to [facility] or just go home. Advised pt if she is still having red flags, severe headache, neck pain, fever uncontrolled vomiting she should go ahead and be reevaluated d/t pt history, if however she feels she can wait to see the Neuro surgeon and or PCP the decision is hers.     - Pt was unsure what she will do, and will reach out to PCP in the morning with an update

## 2021-10-07 NOTE — ED PROVIDER NOTES
[facility]  eMERGENCY dEPARTMENT eNCOUnter   Physician Attestation    Pt Name: Prashant Lane  MRN: [id number]  [place] 1980  Date of evaluation: 10/7/21        Physician Note:    I havepersonally performed and/or participated in the history, exam and medical decision making and agree with all pertinent clinical information. I have also reviewed and agree with the past medical, family and social historyunless otherwise noted. I have personally performed a face to face diagnostic evaluation onthis patient. I have reviewed the mid-levels findings and agree. History: This is a 65-year-old female who comes in for is a fourth visit in 2 days to emergency department. She has been to [facility] she has [facility] after leaving here. She now comes in for her fourth visit. The short version of her history is that she has chronic pain. She has chronic headaches. She alternates between that and episodes of abdominal pain. She had a CT scan of her abdomen pelvis within the last few days. She did not have a head CT so we did that today. She argues that there are not headaches but there is some pain feeling that that makes it not a headache. She used to live in [place] and was followed by a neurosurgeon there. She has a history of of congenital hydrocephalus. At one point she states she had a intracranial hemorrhage. Regardless she will has an appointment she states with Dr. Katie Caruso on Monday. States that yesterday she passed out and hit her head. In the end I agreed to give her 12 tablets of tramadol just to get her by until Monday. I strongly recommend she either completely detoxify herself from all these medications or go through a pain management program.    Physical Exam  Vitals and nursing note reviewed. Constitutional:       Appearance: She is well-developed. She is not diaphoretic. HENT:      Head: Normocephalic and atraumatic. Comments: No visible signs of trauma but she does have tenderness over her shunt port. Right Ear: External ear normal.      Left Ear: External ear normal.   Eyes:      General: No scleral icterus. Right eye: No discharge. Left eye: No discharge. Conjunctiva/sclera: Conjunctivae normal.   Neck:      Trachea: No tracheal deviation. Pulmonary:      Effort: Pulmonary effort is normal. No respiratory distress. Breath sounds: No stridor. Musculoskeletal:         General: Normal range of motion. Cervical back: Normal range of motion. Skin:     General: Skin is warm and dry. Neurological:      General: No focal deficit present. Mental Status: She is alert and oriented to person, place, and time. Coordination: Coordination normal.   Psychiatric:         Mood and Affect: Mood is anxious. Affect is tearful. Speech: Speech normal.         Behavior: Behavior normal. Behavior is cooperative. Cognition and Memory: Cognition normal.       CT HEAD WO CONTRAST    Result Date: 10/7/2021  EXAMINATION: CT OF THE HEAD WITHOUT CONTRAST  10/7/2021 4:22 pm TECHNIQUE: CT of the head was performed without the administration of intravenous contrast. Dose modulation, iterative reconstruction, and/or weight based adjustment of the mA/kV was utilized to reduce the radiation dose to as low as reasonably achievable. COMPARISON: 09/26/2021 HISTORY: ORDERING SYSTEM PROVIDED HISTORY: left sided head injury TECHNOLOGIST PROVIDED HISTORY: Reason for exam:->left sided head injury Reason for exam:->has a shunt, shunt series yesterday normal Has a \"code stroke\" or \"stroke alert\" been called? ->No Decision Support Exception - unselect if not a suspected or confirmed emergency medical condition->Emergency Medical Condition (MA) Is the patient pregnant?->No Reason for Exam: PT. STATES YESTERDAY SHE PASSED OUT LOC AND HIT HEAD C/O RADIATING PAIN AT AREA WHERE SHUNT IS AND RADIATING PAIN DOWN BACK NECK Acuity: Acute Type of Exam: Initial Mechanism of Injury: PASSED OUT LOC Relevant Medical/Surgical History: HX  SHUNT, HX CSF SHUNT FINDINGS: BRAIN/VENTRICLES: There is no acute intracranial hemorrhage, mass effect or midline shift. No abnormal extra-axial fluid collection. The gray-white differentiation is maintained without evidence of an acute infarct. There is no evidence of hydrocephalus. Left-sided ventriculostomy catheter with tip in the region of the 3rd ventricle is stable in position. ORBITS: The visualized portion of the orbits demonstrate no acute abnormality. SINUSES: The visualized paranasal sinuses and mastoid air cells demonstrate no acute abnormality. SOFT TISSUES/SKULL:  No acute abnormality of the visualized skull or soft tissues. There is redemonstration of a aileen hole in the right frontal region. No acute intracranial abnormality. CT CERVICAL SPINE WO CONTRAST    Result Date: 10/7/2021  EXAMINATION: CT OF THE CERVICAL SPINE WITHOUT CONTRAST 10/7/2021 4:22 pm TECHNIQUE: CT of the cervical spine was performed without the administration of intravenous contrast. Multiplanar reformatted images are provided for review. Dose modulation, iterative reconstruction, and/or weight based adjustment of the mA/kV was utilized to reduce the radiation dose to as low as reasonably achievable. COMPARISON: None.  HISTORY: ORDERING SYSTEM PROVIDED HISTORY: fall head injury TECHNOLOGIST PROVIDED HISTORY: Reason for exam:->fall head injury Decision Support Exception - unselect if not a suspected or confirmed emergency medical condition->Emergency Medical Condition (MA) Is the patient pregnant?->No Reason for Exam: PT. STATES SHE PASSED  OUT YESTERDAY LOC AND HIT HEAD  C/O RADIATING  PAIN  [address] IS  AND RADIATING PAIN DOWN BACK NECK Acuity: Acute Type of Exam: Initial Mechanism of Injury: PASSED OUT LOC Relevant Medical/Surgical History: HX SCOLIOSIS, PT. NOT ABLE TO REMOVE PIERCING FROM NOSE FINDINGS: BONES/ALIGNMENT: Trace reversal of the cervical lordosis. Alignment is within normal limits. Vertebral body heights appear maintained. Intervertebral disc heights appear generally maintained. Posterior elements appear intact. DEGENERATIVE CHANGES: No significant degenerative changes. SOFT TISSUES: There is no prevertebral soft tissue swelling. Prominent adenoids and palatine tonsils. Partially visualized ventriculoperitoneal shunt catheter. 1. No acute fracture. No listhesis. 2. Other findings as described. CT ABDOMEN PELVIS W IV CONTRAST Additional Contrast? None    Result Date: 10/6/2021  EXAMINATION: CT OF THE ABDOMEN AND PELVIS WITH CONTRAST 10/6/2021 12:49 pm TECHNIQUE: CT of the abdomen and pelvis was performed with the administration of intravenous contrast. Multiplanar reformatted images are provided for review. Dose modulation, iterative reconstruction, and/or weight based adjustment of the mA/kV was utilized to reduce the radiation dose to as low as reasonably achievable. COMPARISON: CT abdomen and pelvis dated 09/12/2021. HISTORY: ORDERING SYSTEM PROVIDED HISTORY: abdominal pain TECHNOLOGIST PROVIDED HISTORY: Reason for exam:->abdominal pain Additional Contrast?->None Decision Support Exception - unselect if not a suspected or confirmed emergency medical condition->Emergency Medical Condition (MA) Reason for Exam: right side abdominal pain and swelling for over a moth Acuity: Acute Type of Exam: Initial Relevant Medical/Surgical History: abdominal wall mass removal 7.14.21 appy cholecystectomy hysterectomy hernia repair  shunt FINDINGS: CARDIOVASCULAR: The heart is normal in size. There is no pericardial effusion. The aorta and branch vessels are patent and normal in caliber. LUNG BASES: There are no focal consolidations or pleural effusions. 4 mm pulmonary nodule noted in the left lower lobe for which no routine follow-up is recommended.  HEPATOBILIARY: The liver is abdomen on the right. There is a loop of catheter in the right anterior abdomen with the tip extending into the pelvis. The course is unchanged. The catheter is intact with no evidence of kinking or fracture bowel gas pattern is nonspecific with mild retained colonic stool. Contrast in the renal collecting systems from earlier CT     Intact  shunt catheter extending into the low pelvis, similar to the previous study. No evidence of catheter kinking or fracture. EKG visualized preliminarily interpreted by myself. The rhythm is sinus at a rate of 97. Axis is -3. Some nonspecific T wave flattening. Borderline left ventricular atrophy seen in lead aVL. Poor R wave progression from V1 to V4 which is likely due to body habitus and/or position. 1. Other chronic pain          DISPOSITION/PLAN  PATIENT REFERRED TO:  Trang Gurrola MD  [address]  [phone]    On 10/11/2021      DISCHARGE MEDICATIONS:  New Prescriptions    TRAMADOL (ULTRAM) 50 MG TABLET    Take 1 tablet by mouth every 6 hours as needed for Pain for up to 3 days. Intended supply: 3 days.  Take lowest dose possible to manage pain         MD Bianca Renee MD  10/07/21 Gabino Katz MD  10/07/21 Gabino Katz MD  10/07/21 [id number]

## 2021-10-07 NOTE — TELEPHONE ENCOUNTER
Pt is scheduled for a follow up appointment next Friday. She is requesting Tramadol for her head pain. I also let her know about the blood work.

## 2021-10-11 DIAGNOSIS — G89.29 OTHER CHRONIC PAIN: ICD-10-CM

## 2021-10-11 RX ORDER — PROMETHAZINE HYDROCHLORIDE 25 MG/1
25 TABLET ORAL EVERY 6 HOURS PRN
Qty: 12 TABLET | Refills: 1 | Status: ON HOLD | OUTPATIENT
Start: 2021-10-11 | End: 2021-10-13 | Stop reason: SDUPTHER

## 2021-10-12 ENCOUNTER — HOSPITAL ENCOUNTER (EMERGENCY)
Age: 41
Discharge: ANOTHER HOSPITAL | End: 2021-10-13
Attending: EMERGENCY MEDICINE
Payer: COMMERCIAL

## 2021-10-12 DIAGNOSIS — R10.9 INTRACTABLE ABDOMINAL PAIN: Primary | ICD-10-CM

## 2021-10-12 DIAGNOSIS — R11.2 NON-INTRACTABLE VOMITING WITH NAUSEA, UNSPECIFIED VOMITING TYPE: ICD-10-CM

## 2021-10-12 DIAGNOSIS — Z98.2 VP (VENTRICULOPERITONEAL) SHUNT STATUS: ICD-10-CM

## 2021-10-12 DIAGNOSIS — R10.11 INTRACTABLE RIGHT UPPER QUADRANT ABDOMINAL PAIN: ICD-10-CM

## 2021-10-12 DIAGNOSIS — R51.9 INTERMITTENT HEADACHE: ICD-10-CM

## 2021-10-12 LAB
A/G RATIO: 1.3 (ref 1.1–2.2)
ALBUMIN SERPL-MCNC: 4.7 G/DL (ref 3.4–5)
ALP BLD-CCNC: 113 U/L (ref 40–129)
ALT SERPL-CCNC: 14 U/L (ref 10–40)
AMPHETAMINE SCREEN, URINE: ABNORMAL
ANION GAP SERPL CALCULATED.3IONS-SCNC: 11 MMOL/L (ref 3–16)
AST SERPL-CCNC: 8 U/L (ref 15–37)
BARBITURATE SCREEN URINE: ABNORMAL
BASOPHILS ABSOLUTE: 0.1 K/UL (ref 0–0.2)
BASOPHILS RELATIVE PERCENT: 0.8 %
BENZODIAZEPINE SCREEN, URINE: ABNORMAL
BILIRUB SERPL-MCNC: 0.3 MG/DL (ref 0–1)
BILIRUBIN URINE: NEGATIVE
BLOOD, URINE: NEGATIVE
BUN BLDV-MCNC: 12 MG/DL (ref 7–20)
CALCIUM SERPL-MCNC: 10 MG/DL (ref 8.3–10.6)
CANNABINOID SCREEN URINE: ABNORMAL
CHLORIDE BLD-SCNC: 101 MMOL/L (ref 99–110)
CLARITY: CLEAR
CO2: 27 MMOL/L (ref 21–32)
COCAINE METABOLITE SCREEN URINE: ABNORMAL
COLOR: YELLOW
CREAT SERPL-MCNC: 0.6 MG/DL (ref 0.6–1.1)
EOSINOPHILS ABSOLUTE: 0.1 K/UL (ref 0–0.6)
EOSINOPHILS RELATIVE PERCENT: 0.7 %
GFR AFRICAN AMERICAN: >60
GFR NON-AFRICAN AMERICAN: >60
GLOBULIN: 3.5 G/DL
GLUCOSE BLD-MCNC: 108 MG/DL (ref 70–99)
GLUCOSE URINE: NEGATIVE MG/DL
HCT VFR BLD CALC: 46.4 % (ref 36–48)
HEMOGLOBIN: 15.8 G/DL (ref 12–16)
KETONES, URINE: NEGATIVE MG/DL
LACTIC ACID: 1 MMOL/L (ref 0.4–2)
LEUKOCYTE ESTERASE, URINE: NEGATIVE
LIPASE: 18 U/L (ref 13–60)
LYMPHOCYTES ABSOLUTE: 3.6 K/UL (ref 1–5.1)
LYMPHOCYTES RELATIVE PERCENT: 23.6 %
Lab: ABNORMAL
MCH RBC QN AUTO: 29.3 PG (ref 26–34)
MCHC RBC AUTO-ENTMCNC: 34.1 G/DL (ref 31–36)
MCV RBC AUTO: 85.9 FL (ref 80–100)
METHADONE SCREEN, URINE: ABNORMAL
MICROSCOPIC EXAMINATION: NORMAL
MONOCYTES ABSOLUTE: 0.7 K/UL (ref 0–1.3)
MONOCYTES RELATIVE PERCENT: 4.8 %
NEUTROPHILS ABSOLUTE: 10.7 K/UL (ref 1.7–7.7)
NEUTROPHILS RELATIVE PERCENT: 70.1 %
NITRITE, URINE: NEGATIVE
OPIATE SCREEN URINE: POSITIVE
OXYCODONE URINE: ABNORMAL
PDW BLD-RTO: 13.4 % (ref 12.4–15.4)
PH UA: 6
PH UA: 6 (ref 5–8)
PHENCYCLIDINE SCREEN URINE: ABNORMAL
PLATELET # BLD: 347 K/UL (ref 135–450)
PMV BLD AUTO: 8 FL (ref 5–10.5)
POTASSIUM REFLEX MAGNESIUM: 3.7 MMOL/L (ref 3.5–5.1)
PROPOXYPHENE SCREEN: ABNORMAL
PROTEIN UA: NEGATIVE MG/DL
RBC # BLD: 5.41 M/UL (ref 4–5.2)
SODIUM BLD-SCNC: 139 MMOL/L (ref 136–145)
SPECIFIC GRAVITY UA: 1.01 (ref 1–1.03)
TOTAL PROTEIN: 8.2 G/DL (ref 6.4–8.2)
URINE REFLEX TO CULTURE: NORMAL
URINE TYPE: NORMAL
UROBILINOGEN, URINE: 0.2 E.U./DL
WBC # BLD: 15.3 K/UL (ref 4–11)

## 2021-10-12 PROCEDURE — 6360000002 HC RX W HCPCS: Performed by: EMERGENCY MEDICINE

## 2021-10-12 PROCEDURE — 87205 SMEAR GRAM STAIN: CPT

## 2021-10-12 PROCEDURE — 87070 CULTURE OTHR SPECIMN AEROBIC: CPT

## 2021-10-12 PROCEDURE — 36415 COLL VENOUS BLD VENIPUNCTURE: CPT

## 2021-10-12 PROCEDURE — 96365 THER/PROPH/DIAG IV INF INIT: CPT

## 2021-10-12 PROCEDURE — 81003 URINALYSIS AUTO W/O SCOPE: CPT

## 2021-10-12 PROCEDURE — 83605 ASSAY OF LACTIC ACID: CPT

## 2021-10-12 PROCEDURE — 84157 ASSAY OF PROTEIN OTHER: CPT

## 2021-10-12 PROCEDURE — 85025 COMPLETE CBC W/AUTO DIFF WBC: CPT

## 2021-10-12 PROCEDURE — 2580000003 HC RX 258: Performed by: EMERGENCY MEDICINE

## 2021-10-12 PROCEDURE — 89050 BODY FLUID CELL COUNT: CPT

## 2021-10-12 PROCEDURE — 99285 EMERGENCY DEPT VISIT HI MDM: CPT

## 2021-10-12 PROCEDURE — 80307 DRUG TEST PRSMV CHEM ANLYZR: CPT

## 2021-10-12 PROCEDURE — 82945 GLUCOSE OTHER FLUID: CPT

## 2021-10-12 PROCEDURE — 62270 DX LMBR SPI PNXR: CPT

## 2021-10-12 PROCEDURE — 80053 COMPREHEN METABOLIC PANEL: CPT

## 2021-10-12 PROCEDURE — 83690 ASSAY OF LIPASE: CPT

## 2021-10-12 PROCEDURE — 96376 TX/PRO/DX INJ SAME DRUG ADON: CPT

## 2021-10-12 PROCEDURE — 96375 TX/PRO/DX INJ NEW DRUG ADDON: CPT

## 2021-10-12 RX ORDER — ONDANSETRON 2 MG/ML
4 INJECTION INTRAMUSCULAR; INTRAVENOUS ONCE
Status: COMPLETED | OUTPATIENT
Start: 2021-10-12 | End: 2021-10-12

## 2021-10-12 RX ORDER — PROMETHAZINE HYDROCHLORIDE 12.5 MG/1
TABLET ORAL
COMMUNITY
Start: 2021-08-08 | End: 2021-10-12

## 2021-10-12 RX ORDER — SODIUM CHLORIDE, SODIUM LACTATE, POTASSIUM CHLORIDE, AND CALCIUM CHLORIDE .6; .31; .03; .02 G/100ML; G/100ML; G/100ML; G/100ML
1000 INJECTION, SOLUTION INTRAVENOUS ONCE
Status: COMPLETED | OUTPATIENT
Start: 2021-10-12 | End: 2021-10-12

## 2021-10-12 RX ORDER — NEOMYCIN SULFATE, POLYMYXIN B SULFATE, AND DEXAMETHASONE 3.5; 10000; 1 MG/G; [USP'U]/G; MG/G
OINTMENT OPHTHALMIC
COMMUNITY
Start: 2021-09-13 | End: 2021-10-27 | Stop reason: ALTCHOICE

## 2021-10-12 RX ORDER — PREDNISONE 50 MG/1
TABLET ORAL
Status: ON HOLD | COMMUNITY
Start: 2021-10-05 | End: 2021-10-13 | Stop reason: HOSPADM

## 2021-10-12 RX ORDER — TRAMADOL HYDROCHLORIDE 50 MG/1
50 TABLET ORAL EVERY 6 HOURS PRN
COMMUNITY
End: 2021-10-12

## 2021-10-12 RX ORDER — ALPRAZOLAM 1 MG/1
1 TABLET ORAL PRN
COMMUNITY
Start: 2021-09-21 | End: 2021-11-09 | Stop reason: ALTCHOICE

## 2021-10-12 RX ORDER — LISDEXAMFETAMINE DIMESYLATE 50 MG
50 CAPSULE ORAL EVERY MORNING
COMMUNITY
Start: 2021-09-21 | End: 2021-11-30 | Stop reason: SDUPTHER

## 2021-10-12 RX ORDER — MORPHINE SULFATE 2 MG/ML
2 INJECTION, SOLUTION INTRAMUSCULAR; INTRAVENOUS ONCE
Status: COMPLETED | OUTPATIENT
Start: 2021-10-12 | End: 2021-10-12

## 2021-10-12 RX ORDER — GABAPENTIN 100 MG/1
CAPSULE ORAL
COMMUNITY
Start: 2021-10-02 | End: 2021-10-28 | Stop reason: ALTCHOICE

## 2021-10-12 RX ORDER — OXYCODONE HYDROCHLORIDE 5 MG/1
TABLET ORAL
COMMUNITY
Start: 2021-08-08 | End: 2021-10-26

## 2021-10-12 RX ORDER — METOCLOPRAMIDE 10 MG/1
TABLET ORAL
COMMUNITY
Start: 2021-09-28 | End: 2021-10-26

## 2021-10-12 RX ADMIN — MORPHINE SULFATE 2 MG: 2 INJECTION, SOLUTION INTRAMUSCULAR; INTRAVENOUS at 22:28

## 2021-10-12 RX ADMIN — SODIUM CHLORIDE, POTASSIUM CHLORIDE, SODIUM LACTATE AND CALCIUM CHLORIDE 1000 ML: 600; 310; 30; 20 INJECTION, SOLUTION INTRAVENOUS at 22:25

## 2021-10-12 RX ADMIN — ONDANSETRON 4 MG: 2 INJECTION INTRAMUSCULAR; INTRAVENOUS at 22:27

## 2021-10-12 ASSESSMENT — PAIN DESCRIPTION - LOCATION: LOCATION: ABDOMEN;STERNUM

## 2021-10-12 ASSESSMENT — PAIN SCALES - GENERAL
PAINLEVEL_OUTOF10: 9
PAINLEVEL_OUTOF10: 10

## 2021-10-12 ASSESSMENT — PAIN DESCRIPTION - PAIN TYPE: TYPE: ACUTE PAIN;CHRONIC PAIN

## 2021-10-12 ASSESSMENT — PAIN - FUNCTIONAL ASSESSMENT: PAIN_FUNCTIONAL_ASSESSMENT: ACTIVITIES ARE NOT PREVENTED

## 2021-10-12 ASSESSMENT — PAIN DESCRIPTION - DESCRIPTORS: DESCRIPTORS: DISCOMFORT

## 2021-10-12 ASSESSMENT — PAIN DESCRIPTION - ONSET: ONSET: ON-GOING

## 2021-10-12 ASSESSMENT — PAIN DESCRIPTION - ORIENTATION: ORIENTATION: RIGHT;LOWER;ANTERIOR

## 2021-10-13 ENCOUNTER — APPOINTMENT (OUTPATIENT)
Dept: GENERAL RADIOLOGY | Age: 41
End: 2021-10-13
Payer: COMMERCIAL

## 2021-10-13 ENCOUNTER — HOSPITAL ENCOUNTER (OUTPATIENT)
Age: 41
Setting detail: OBSERVATION
Discharge: HOME | End: 2021-10-13
Attending: INTERNAL MEDICINE | Admitting: INTERNAL MEDICINE
Payer: COMMERCIAL

## 2021-10-13 ENCOUNTER — TELEPHONE (OUTPATIENT)
Dept: PRIMARY CARE CLINIC | Age: 41
End: 2021-10-13

## 2021-10-13 ENCOUNTER — APPOINTMENT (OUTPATIENT)
Dept: CT IMAGING | Age: 41
End: 2021-10-13
Payer: COMMERCIAL

## 2021-10-13 VITALS
BODY MASS INDEX: 31.56 KG/M2 | DIASTOLIC BLOOD PRESSURE: 67 MMHG | OXYGEN SATURATION: 95 % | HEART RATE: 79 BPM | SYSTOLIC BLOOD PRESSURE: 117 MMHG | WEIGHT: 156.53 LBS | TEMPERATURE: 98.5 F | RESPIRATION RATE: 14 BRPM | HEIGHT: 59 IN

## 2021-10-13 DIAGNOSIS — R10.9 INTRACTABLE ABDOMINAL PAIN: Primary | ICD-10-CM

## 2021-10-13 LAB
APPEARANCE CSF: CLEAR
APPEARANCE CSF: CLEAR
CLOT EVALUATION CSF: ABNORMAL
CLOT EVALUATION CSF: NORMAL
COLOR CSF: COLORLESS
COLOR CSF: COLORLESS
GLUCOSE, CSF: 62 MG/DL (ref 40–80)
NO DIFFERENTIAL CSF: ABNORMAL
NO DIFFERENTIAL CSF: NORMAL
PROTEIN CSF: 103 MG/DL (ref 15–45)
RBC CSF: 0 /CUMM
RBC CSF: 3 /CUMM
SARS-COV-2, NAAT: NOT DETECTED
TUBE NUMBER CSF: ABNORMAL
TUBE NUMBER CSF: NORMAL
VOLUME CSF: 5 ML
WBC CSF: 1 /CUMM (ref 0–5)
WBC CSF: 2 /CUMM (ref 0–5)

## 2021-10-13 PROCEDURE — G0379 DIRECT REFER HOSPITAL OBSERV: HCPCS

## 2021-10-13 PROCEDURE — 6360000004 HC RX CONTRAST MEDICATION: Performed by: EMERGENCY MEDICINE

## 2021-10-13 PROCEDURE — 74177 CT ABD & PELVIS W/CONTRAST: CPT

## 2021-10-13 PROCEDURE — 71045 X-RAY EXAM CHEST 1 VIEW: CPT

## 2021-10-13 PROCEDURE — G0378 HOSPITAL OBSERVATION PER HR: HCPCS

## 2021-10-13 PROCEDURE — 2580000003 HC RX 258: Performed by: EMERGENCY MEDICINE

## 2021-10-13 PROCEDURE — 87635 SARS-COV-2 COVID-19 AMP PRB: CPT

## 2021-10-13 PROCEDURE — 6360000002 HC RX W HCPCS: Performed by: EMERGENCY MEDICINE

## 2021-10-13 RX ORDER — PROMETHAZINE HYDROCHLORIDE 25 MG/1
25 TABLET ORAL EVERY 6 HOURS PRN
Qty: 8 TABLET | Refills: 1 | Status: SHIPPED | OUTPATIENT
Start: 2021-10-13 | End: 2021-10-16

## 2021-10-13 RX ORDER — HYDROCODONE BITARTRATE AND ACETAMINOPHEN 5; 325 MG/1; MG/1
1 TABLET ORAL EVERY 4 HOURS PRN
Qty: 6 TABLET | Refills: 0 | Status: SHIPPED | OUTPATIENT
Start: 2021-10-13 | End: 2021-10-16

## 2021-10-13 RX ORDER — DIPHENHYDRAMINE HYDROCHLORIDE 50 MG/ML
25 INJECTION INTRAMUSCULAR; INTRAVENOUS ONCE
Status: DISCONTINUED | OUTPATIENT
Start: 2021-10-13 | End: 2021-10-13

## 2021-10-13 RX ORDER — PROMETHAZINE HYDROCHLORIDE 25 MG/ML
12.5 INJECTION, SOLUTION INTRAMUSCULAR; INTRAVENOUS ONCE
Status: DISCONTINUED | OUTPATIENT
Start: 2021-10-13 | End: 2021-10-13 | Stop reason: HOSPADM

## 2021-10-13 RX ORDER — ONDANSETRON 2 MG/ML
4 INJECTION INTRAMUSCULAR; INTRAVENOUS ONCE
Status: COMPLETED | OUTPATIENT
Start: 2021-10-13 | End: 2021-10-13

## 2021-10-13 RX ORDER — HALOPERIDOL 5 MG/ML
5 INJECTION INTRAMUSCULAR ONCE
Status: DISCONTINUED | OUTPATIENT
Start: 2021-10-13 | End: 2021-10-13

## 2021-10-13 RX ADMIN — IOPAMIDOL 100 ML: 755 INJECTION, SOLUTION INTRAVENOUS at 04:04

## 2021-10-13 RX ADMIN — HYDROMORPHONE HYDROCHLORIDE 0.5 MG: 1 INJECTION, SOLUTION INTRAMUSCULAR; INTRAVENOUS; SUBCUTANEOUS at 00:31

## 2021-10-13 RX ADMIN — HYDROMORPHONE HYDROCHLORIDE 1 MG: 1 INJECTION, SOLUTION INTRAMUSCULAR; INTRAVENOUS; SUBCUTANEOUS at 06:27

## 2021-10-13 RX ADMIN — ONDANSETRON 4 MG: 2 INJECTION INTRAMUSCULAR; INTRAVENOUS at 03:32

## 2021-10-13 RX ADMIN — HYDROMORPHONE HYDROCHLORIDE 0.5 MG: 1 INJECTION, SOLUTION INTRAMUSCULAR; INTRAVENOUS; SUBCUTANEOUS at 03:33

## 2021-10-13 RX ADMIN — PROMETHAZINE HYDROCHLORIDE: 25 INJECTION INTRAMUSCULAR; INTRAVENOUS at 00:35

## 2021-10-13 ASSESSMENT — PAIN - FUNCTIONAL ASSESSMENT: PAIN_FUNCTIONAL_ASSESSMENT: ACTIVITIES ARE NOT PREVENTED

## 2021-10-13 ASSESSMENT — PAIN DESCRIPTION - ONSET: ONSET: ON-GOING

## 2021-10-13 ASSESSMENT — PAIN SCALES - GENERAL
PAINLEVEL_OUTOF10: 4
PAINLEVEL_OUTOF10: 8
PAINLEVEL_OUTOF10: 9
PAINLEVEL_OUTOF10: 6
PAINLEVEL_OUTOF10: 9

## 2021-10-13 ASSESSMENT — PAIN DESCRIPTION - PAIN TYPE: TYPE: ACUTE PAIN;CHRONIC PAIN

## 2021-10-13 ASSESSMENT — PAIN DESCRIPTION - DESCRIPTORS: DESCRIPTORS: DISCOMFORT

## 2021-10-13 ASSESSMENT — PAIN DESCRIPTION - ORIENTATION: ORIENTATION: MID;RIGHT;UPPER

## 2021-10-13 ASSESSMENT — PAIN DESCRIPTION - LOCATION: LOCATION: ABDOMEN

## 2021-10-13 NOTE — ED NOTES
Pt approaches nurse's station and asks if [facility] could be a consideration for admit. Pt then states \"it's OK anywhere will be OK. \"     Ta Davey RN  10/13/21 [id number]

## 2021-10-13 NOTE — ED NOTES
Pt requesting pain and nausea medicine.  No vomiting since admit to [facility], RN  10/12/21 9073

## 2021-10-13 NOTE — ED NOTES
Continuing to attempt to draw bllod and or place IV.  EMD aware     Светлана Lowery, RN  10/12/21 1102

## 2021-10-13 NOTE — ED NOTES
Pt transported to [address] [place] [address]  By strategic, belongings sent, reports to RN at [facility]   Pain [address] [place], RN  10/13/21 2946

## 2021-10-13 NOTE — ED PROVIDER NOTES
LITHOTRIPSY Bilateral 12/10/2019    OTHER SURGICAL HISTORY  10/19/2016    op lap    VENTRICULOPERITONEAL SHUNT      multiple revisions, most recent 2015     Family History   Problem Relation Age of Onset    High Blood Pressure Mother     Diabetes Mother     High Cholesterol Mother     Depression Mother     Diabetes Maternal Grandmother     High Blood Pressure Maternal Grandmother     High Cholesterol Maternal Grandmother     Heart Disease Maternal Grandfather     High Blood Pressure Maternal Grandfather     Heart Disease Paternal Grandmother     Stroke Paternal Grandfather     Depression Sister     Cirrhosis Father     Rheum Arthritis Neg Hx     Osteoarthritis Neg Hx     Asthma Neg Hx     Breast Cancer Neg Hx     Cancer Neg Hx     Heart Failure Neg Hx     Hypertension Neg Hx     Migraines Neg Hx     Ovarian Cancer Neg Hx     Rashes/Skin Problems Neg Hx     Seizures Neg Hx     Thyroid Disease Neg Hx      Social History     Socioeconomic History    Marital status: Single     Spouse name: Not on file    Number of children: Not on file    Years of education: Not on file    Highest education level: Not on file   Occupational History    Occupation: disability, SSI    Tobacco Use    Smoking status: Current Some Day Smoker     Packs/day: 0.50     Years: 18.00     Pack years: 9.00     Types: Cigarettes    Smokeless tobacco: Never Used   Vaping Use    Vaping Use: Never used   Substance and Sexual Activity    Alcohol use: No     Alcohol/week: 0.0 standard drinks    Drug use: Never    Sexual activity: Not Currently     Partners: Male   Other Topics Concern    Not on file   Social History Narrative    Not on file     Social Determinants of Health     Financial Resource Strain: Low Risk     Difficulty of Paying Living Expenses: Not hard at all   Food Insecurity: No Food Insecurity    Worried About Running Out of Food in the Last Year: Never true    [place] of Food in the Last Year: Never true   Transportation Needs:     Lack of Transportation (Medical):  Lack of Transportation (Non-Medical):    Physical Activity:     Days of Exercise per Week:     Minutes of Exercise per Session:    Stress:     Feeling of Stress :    Social Connections:     Frequency of Communication with Friends and Family:     Frequency of Social Gatherings with Friends and Family:     Attends Bahai Services:     Active Member of Clubs or Organizations:     Attends Club or Organization Meetings:     Marital Status:    Intimate Partner Violence:     Fear of Current or Ex-Partner:     Emotionally Abused:     Physically Abused:     Sexually Abused:      Current Facility-Administered Medications   Medication Dose Route Frequency Provider Last Rate Last Admin    HYDROmorphone (DILAUDID) injection 0.5 mg  0.5 mg IntraVENous Once PRN Leeta Sins, DO        promethazine (PHENERGAN) 12.5 mg in sodium chloride 0.9 % 50 mL IVPB   IntraVENous Once PRN Leeta Sins, DO         Current Outpatient Medications   Medication Sig Dispense Refill    gabapentin (NEURONTIN) 100 MG capsule       ALPRAZolam (XANAX) 1 MG tablet       metoclopramide (REGLAN) 10 MG tablet       neomycin-polymyxin-dexameth 3.5-51892-4.1 OINT       oxyCODONE (ROXICODONE) 5 MG immediate release tablet       predniSONE (DELTASONE) 50 MG tablet       VYVANSE 50 MG capsule       promethazine (PHENERGAN) 25 MG tablet Take 1 tablet by mouth every 6 hours as needed for Nausea WARNING:  May cause drowsiness. May impair ability to operate vehicles or machinery. Do not use in combination with alcohol.  12 tablet 1    cetirizine (ZYRTEC) 10 MG tablet Take 1 tablet by mouth daily 30 tablet 0    Hyoscyamine Sulfate SL (LEVSIN/SL) 0.125 MG SUBL Place 0.125 mg under the tongue every 4 hours as needed (abdominal pain) 30 each 1    ondansetron (ZOFRAN ODT) 4 MG disintegrating tablet Take 1-2 tablets by mouth every 12 hours as needed for Nausea May Sub regular tablet (non-ODT) if insurance does not cover ODT. 20 tablet 0    ALPRAZolam (XANAX) 0.5 MG tablet Take 0.5 mg by mouth daily as needed.  simvastatin (ZOCOR) 10 MG tablet Take 1 tablet by mouth nightly 90 tablet 1     Allergies   Allergen Reactions    Bee Venom Shortness Of Breath and Swelling    Bentyl [Dicyclomine Hcl] Shortness Of Breath and Anxiety    Dicyclomine Anxiety and Shortness Of Breath    Ketorolac Tromethamine Hives and Itching     Injectable only  Tolerates PO NSAIDs fine    [place] Anaphylaxis    Mushroom Extract Complex Anaphylaxis     Can't eat mushrooms.  Oxycodone-Acetaminophen Nausea And Vomiting     Tolerates Norco/Vicodin ok  Patient can not tolerate Percocet well. Extreme vomiting      Sulfa Antibiotics Shortness Of Breath    Vancomycin Anaphylaxis and Hives    Adhesive Tape Dermatitis     Other reaction(s): Dermatitis    Dicyclomine Hcl     Haldol [Haloperidol]      \"Freaks Out\"    Hydrocodone Hives     Tolerates morphine       Silicone Hives    Sulfacetamide Hives    Acetaminophen Anxiety     Patient reports when taking acetaminophen (including Norco/Percocet) she gets severe anxiety when taking this medication.  Butalbital-Apap-Caff-Cod Anxiety    Ceftaroline Rash    Daptomycin Swelling and Rash    Ketamine Anxiety and Nausea And Vomiting    Levaquin [Levofloxacin] Anxiety    Methocarbamol Rash    Prochlorperazine Anxiety    Tazobactam Nausea And Vomiting and Anxiety    Zosyn [Piperacillin Sod-Tazobactam So] Hives     Also causes nausea, SOB, dizziness       REVIEW OF SYSTEMS  10 systems reviewed, pertinent positives per HPI otherwise noted to be negative. PHYSICAL EXAM  /77   Pulse 72   Temp 98.7 °F (37.1 °C) (Oral)   Resp 16   Ht 4' 11\" (1.499 m)   Wt 156 lb 8.4 oz (71 kg)   LMP 10/31/2016 (Exact Date)   SpO2 98%   BMI 31.61 kg/m²   GENERAL APPEARANCE: Awake and alert. Cooperative.   No acute distress  HEAD: Normocephalic. Atraumatic. Well-healed surgical scars of scalp, no erythema/rash around shunt area on left side of head  EYES: PERRL. EOM's grossly intact. No scleral icterus  ENT: Mucous membranes are tacky, airway patent, no stridor  NECK: Supple. No rigidity  HEART: RRR. No murmurs  LUNGS: Respirations nonlabored. Lungs are clear to auscultation bilaterally. ABDOMEN: Soft. Non-distended. Diffuse moderate upper abdominal tenderness, right greater than left, no CVA tenderness. Slight fullness on the right upper abdomen, voluntary guarding, no rebound. No rigidity, normal bowel sounds. Multiple well-healed surgical abdominal scars  EXTREMITIES: No peripheral edema. Moves all extremities equally. SKIN: Warm and dry. No acute rashes. No jaundice  NEUROLOGICAL: Alert and oriented. No gross facial drooping. Strength 5/5, sensation intact. No truncal ataxia. Normal speech, steady gait  PSYCHIATRIC: Normal mood and affect. Appears anxious    LABS  I have reviewed all labs for this visit.    Results for orders placed or performed during the hospital encounter of 10/12/21   Culture, CSF    Specimen: CSF   Result Value Ref Range    Gram Stain Result       No organisms seen  WBC's (Mononuclear) present  WBC's (Polymorphonuclear) present     COVID-19, Rapid    Specimen: Nasopharyngeal Swab   Result Value Ref Range    SARS-CoV-2, NAAT Not Detected Not Detected   Lactic Acid, Plasma   Result Value Ref Range    Lactic Acid 1.0 0.4 - 2.0 mmol/L   CBC Auto Differential   Result Value Ref Range    WBC 15.3 (H) 4.0 - 11.0 K/uL    RBC 5.41 (H) 4.00 - 5.20 M/uL    Hemoglobin 15.8 12.0 - 16.0 g/dL    Hematocrit 46.4 36.0 - 48.0 %    MCV 85.9 80.0 - 100.0 fL    MCH 29.3 26.0 - 34.0 pg    MCHC 34.1 31.0 - 36.0 g/dL    RDW 13.4 12.4 - 15.4 %    Platelets 083 755 - 124 K/uL    MPV 8.0 5.0 - 10.5 fL    Neutrophils % 70.1 %    Lymphocytes % 23.6 %    Monocytes % 4.8 %    Eosinophils % 0.7 %    Basophils % 0.8 %    Neutrophils Absolute 10.7 (H) 1.7 - 7.7 K/uL    Lymphocytes Absolute 3.6 1.0 - 5.1 K/uL    Monocytes Absolute 0.7 0.0 - 1.3 K/uL    Eosinophils Absolute 0.1 0.0 - 0.6 K/uL    Basophils Absolute 0.1 0.0 - 0.2 K/uL   Comprehensive Metabolic Panel w/ Reflex to MG   Result Value Ref Range    Sodium 139 136 - 145 mmol/L    Potassium reflex Magnesium 3.7 3.5 - 5.1 mmol/L    Chloride 101 99 - 110 mmol/L    CO2 27 21 - 32 mmol/L    Anion Gap 11 3 - 16    Glucose 108 (H) 70 - 99 mg/dL    BUN 12 7 - 20 mg/dL    CREATININE 0.6 0.6 - 1.1 mg/dL    GFR Non-African American >60 >60    GFR African American >60 >60    Calcium 10.0 8.3 - 10.6 mg/dL    Total Protein 8.2 6.4 - 8.2 g/dL    Albumin 4.7 3.4 - 5.0 g/dL    Albumin/Globulin Ratio 1.3 1.1 - 2.2    Total Bilirubin 0.3 0.0 - 1.0 mg/dL    Alkaline Phosphatase 113 40 - 129 U/L    ALT 14 10 - 40 U/L    AST 8 (L) 15 - 37 U/L    Globulin 3.5 Not Established g/dL   Lipase   Result Value Ref Range    Lipase 18.0 13.0 - 60.0 U/L   Urinalysis Reflex to Culture    Specimen: Urine, clean catch   Result Value Ref Range    Color, UA Yellow Straw/Yellow    Clarity, UA Clear Clear    Glucose, Ur Negative Negative mg/dL    Bilirubin Urine Negative Negative    Ketones, Urine Negative Negative mg/dL    Specific Gravity, UA 1.015 1.005 - 1.030    Blood, Urine Negative Negative    pH, UA 6.0 5.0 - 8.0    Protein, UA Negative Negative mg/dL    Urobilinogen, Urine 0.2 <2.0 E.U./dL    Nitrite, Urine Negative Negative    Leukocyte Esterase, Urine Negative Negative    Microscopic Examination Not Indicated     Urine Type Cleancatch     Urine Reflex to Culture Not Indicated    Urine Drug Screen   Result Value Ref Range    Amphetamine Screen, Urine Neg Negative <1000ng/mL    Barbiturate Screen, Ur Neg Negative <200 ng/mL    Benzodiazepine Screen, Urine Neg Negative <200 ng/mL    Cannabinoid Scrn, Ur Neg Negative <50 ng/mL    Cocaine Metabolite Screen, Urine Neg Negative <300 ng/mL    Opiate Scrn, Ur POSITIVE (A) Negative <300 ng/mL    PCP Screen, Urine Neg Negative <25 ng/mL    Methadone Screen, Urine Neg Negative <300 ng/mL    Propoxyphene Scrn, Ur Neg Negative <300 ng/mL    Oxycodone Urine Neg Negative <100 ng/ml    pH, UA 6.0     Drug Screen Comment: see below    CSF Cell Count with Differential   Result Value Ref Range    Color, CSF Colorless Colorless    Appearance, CSF Clear Clear    Tube Number + CELL CT + DIFF-CSF Tube 4     Clot Evaluation CSF see below     WBC, CSF 2 0 - 5 /cumm    RBC, CSF 0 0 /cumm    Volume Csf 5.0 mL    No differential CSF see below    CSF Cell Count with Differential   Result Value Ref Range    Color, CSF Colorless Colorless    Appearance, CSF Clear Clear    Tube Number + CELL CT + DIFF-CSF Tube 1     Clot Evaluation CSF see below     WBC, CSF 1 0 - 5 /cumm    RBC, CSF 3 (A) 0 /cumm    No differential CSF see below    Glucose, CSF   Result Value Ref Range    Glucose, CSF 62 40 - 80 mg/dL   Protein, CSF   Result Value Ref Range    Protein,  (H) 15 - 45 mg/dL           RADIOLOGY  CT HEAD WO CONTRAST    Result Date: 10/7/2021  EXAMINATION: CT OF THE HEAD WITHOUT CONTRAST  10/7/2021 4:22 pm TECHNIQUE: CT of the head was performed without the administration of intravenous contrast. Dose modulation, iterative reconstruction, and/or weight based adjustment of the mA/kV was utilized to reduce the radiation dose to as low as reasonably achievable. COMPARISON: 09/26/2021 HISTORY: ORDERING SYSTEM PROVIDED HISTORY: left sided head injury TECHNOLOGIST PROVIDED HISTORY: Reason for exam:->left sided head injury Reason for exam:->has a shunt, shunt series yesterday normal Has a \"code stroke\" or \"stroke alert\" been called? ->No Decision Support Exception - unselect if not a suspected or confirmed emergency medical condition->Emergency Medical Condition ([place]) Is the patient pregnant?->No Reason for Exam: PT. STATES YESTERDAY SHE PASSED OUT LOC AND HIT HEAD C/O RADIATING PAIN AT [address] RADIATING PAIN DOWN BACK NECK Acuity: Acute Type of Exam: Initial Mechanism of Injury: PASSED OUT LOC Relevant Medical/Surgical History: HX  SHUNT, HX CSF SHUNT FINDINGS: BRAIN/VENTRICLES: There is no acute intracranial hemorrhage, mass effect or midline shift. No abnormal extra-axial fluid collection. The gray-white differentiation is maintained without evidence of an acute infarct. There is no evidence of hydrocephalus. Left-sided ventriculostomy catheter with tip in the region of the 3rd ventricle is stable in position. ORBITS: The visualized portion of the orbits demonstrate no acute abnormality. SINUSES: The visualized paranasal sinuses and mastoid air cells demonstrate no acute abnormality. SOFT TISSUES/SKULL:  No acute abnormality of the visualized skull or soft tissues. There is redemonstration of a aileen hole in the right frontal region. No acute intracranial abnormality. CT HEAD WO CONTRAST    Result Date: 9/27/2021  EXAMINATION: CT OF THE HEAD WITHOUT CONTRAST  9/26/2021 11:20 pm TECHNIQUE: CT of the head was performed without the administration of intravenous contrast. Dose modulation, iterative reconstruction, and/or weight based adjustment of the mA/kV was utilized to reduce the radiation dose to as low as reasonably achievable. COMPARISON: A 09/18/2021 and 08/17/2021 HISTORY: ORDERING SYSTEM PROVIDED HISTORY: shunt eval TECHNOLOGIST PROVIDED HISTORY: Reason for exam:->shunt eval Has a \"code stroke\" or \"stroke alert\" been called? ->No Decision Support Exception - unselect if not a suspected or confirmed emergency medical condition->Emergency Medical Condition (MA) Is the patient pregnant?->No Reason for Exam: shunt eval; pt presents with facial swelling FINDINGS: BRAIN/VENTRICLES: There is no acute intracranial hemorrhage, mass effect or midline shift. No abnormal extra-axial fluid collection. The gray-white differentiation is maintained without evidence of an acute infarct.   Full brain volume is stable and appears symmetric. There is a left frontal approach ventricular shunt coursing through the left lateral ventricle with the tip at the midline, near the expected 3rd ventricle. Slit-like ventricles are unchanged from the previous exams. Suprasellar cistern and ambient cistern are still visualized. ORBITS: The visualized portion of the orbits demonstrate no acute abnormality. SINUSES: The visualized paranasal sinuses and mastoid air cells demonstrate no acute abnormality. SOFT TISSUES/SKULL:  There is no acute skull fracture or depression. Right and left frontal aileen holes are present. The left frontal shunt has an external radiolucent connector in the catheter running down the left parietal scalp into the neck. The radiodense portion of the catheter is intact. 1.  Stable unenhanced CT of the brain. No hemorrhage, mass effect, midline shift, or hydrocephalus. 2.  Left frontal ventricular shunt is stable with slit-like ventricles. The configuration is unchanged from the old exams. CT HEAD WO CONTRAST    Result Date: 9/18/2021  EXAM: CT HEAD WO CONTRAST INDICATION: HEAD TRAUMA, CLOSED, MILD, ABN NEURO EXAM AND/OR RISK FACTORS,  shunt, headache; COMPARISON: 8/17/2021. TECHNICAL: Axial CT imaging obtained from vertex to skull base. Axial images and multiplanar reformatted images reviewed. Up-to-date CT equipment and radiation dose reduction techniques were employed. IV Contrast: None. FINDINGS: Left frontal approach ventriculostomy shunt catheter in place with tip near the foramen of Monro, unchanged. Slitlike lateral ventricles, unchanged. No acute intracranial hemorrhage, large territorial infarction or mass effect. Visualized paranasal sinuses and mastoid air cells are clear. No acute intracranial abnormality. Left frontal approach ventriculostomy shunt catheter in place with slitlike ventricles, unchanged.     CT CERVICAL SPINE WO CONTRAST    Result Date: 10/7/2021  EXAMINATION: CT OF THE CERVICAL SPINE WITHOUT CONTRAST 10/7/2021 4:22 pm TECHNIQUE: CT of the cervical spine was performed without the administration of intravenous contrast. Multiplanar reformatted images are provided for review. Dose modulation, iterative reconstruction, and/or weight based adjustment of the mA/kV was utilized to reduce the radiation dose to as low as reasonably achievable. COMPARISON: None. HISTORY: ORDERING SYSTEM PROVIDED HISTORY: fall head injury TECHNOLOGIST PROVIDED HISTORY: Reason for exam:->fall head injury Decision Support Exception - unselect if not a suspected or confirmed emergency medical condition->Emergency Medical Condition ([place]) Is the patient pregnant?->No Reason for Exam: PT. STATES SHE PASSED  OUT YESTERDAY LOC AND HIT HEAD  C/O RADIATING  PAIN  [address] IS  AND RADIATING PAIN DOWN BACK NECK Acuity: Acute Type of Exam: Initial Mechanism of Injury: PASSED OUT LOC Relevant Medical/Surgical History: HX SCOLIOSIS, PT. NOT ABLE TO REMOVE PIERCING FROM NOSE FINDINGS: BONES/ALIGNMENT: Trace reversal of the cervical lordosis. Alignment is within normal limits. Vertebral body heights appear maintained. Intervertebral disc heights appear generally maintained. Posterior elements appear intact. DEGENERATIVE CHANGES: No significant degenerative changes. SOFT TISSUES: There is no prevertebral soft tissue swelling. Prominent adenoids and palatine tonsils. Partially visualized ventriculoperitoneal shunt catheter. 1. No acute fracture. No listhesis. 2. Other findings as described. CT ABDOMEN PELVIS W IV CONTRAST Additional Contrast? None    Result Date: 10/6/2021  EXAMINATION: CT OF THE ABDOMEN AND PELVIS WITH CONTRAST 10/6/2021 12:49 pm TECHNIQUE: CT of the abdomen and pelvis was performed with the administration of intravenous contrast. Multiplanar reformatted images are provided for review.  Dose modulation, iterative reconstruction, and/or weight based adjustment of the mA/kV was utilized to reduce the radiation dose to as low as reasonably achievable. COMPARISON: CT abdomen and pelvis dated 09/12/2021. HISTORY: ORDERING SYSTEM PROVIDED HISTORY: abdominal pain TECHNOLOGIST PROVIDED HISTORY: Reason for exam:->abdominal pain Additional Contrast?->None Decision Support Exception - unselect if not a suspected or confirmed emergency medical condition->Emergency Medical Condition (MA) Reason for Exam: right side abdominal pain and swelling for over a moth Acuity: Acute Type of Exam: Initial Relevant Medical/Surgical History: abdominal wall mass removal 7.14.21 appy cholecystectomy hysterectomy hernia repair  shunt FINDINGS: CARDIOVASCULAR: The heart is normal in size. There is no pericardial effusion. The aorta and branch vessels are patent and normal in caliber. LUNG BASES: There are no focal consolidations or pleural effusions. 4 mm pulmonary nodule noted in the left lower lobe for which no routine follow-up is recommended. HEPATOBILIARY: The liver is normal in size. There are no focal hepatic lesions. There is no abnormal biliary ductal dilatation. The gallbladder is surgically absent. SPLEEN: Unremarkable. PANCREAS: Unremarkable ADRENAL GLANDS: Unremarkable. KIDNEYS: Kidneys are normal in size and contour and demonstrate symmetric enhancement. There is no hydronephrosis. There is redemonstration of a 3 mm nonobstructing calculus in the inferior pole of the right kidney. ABDOMINAL NODES: No adenopathy is appreciated. PELVIC ORGANS: The urinary bladder is unremarkable. The uterus is surgically absent. PERITONEUM/MESENTERY/BOWEL: The stomach is unremarkable. There is no bowel obstruction. There is no bowel wall thickening. The appendix is surgically absent. There is diverticulosis without evidence of diverticulitis. Ventriculoperitoneal shunt is noted terminating in the lower mid abdomen.  BONES/SOFT TISSUES: There is no acute osseous or soft tissue abnormality. No CT evidence of acute intra-abdominal pathology. Unchanged 3 mm nonobstructing right renal calculus. XR SHUNT SERIES PLACEMENT (<4 VIEWS)    Result Date: 10/6/2021  EXAMINATION: SHUNT SERIES 10/6/2021 1:18 pm COMPARISON: 09/26/2021. AP and lateral skull demonstrate a left-sided ventriculostomy catheter. Catheter descends on the left and is intact. HISTORY: ORDERING SYSTEM PROVIDED HISTORY: shunt in place, light headedness TECHNOLOGIST PROVIDED HISTORY: Reason for exam:->shunt in place, light headedness Reason for Exam: Patient with a  shunt with reported \"positional issues\", states she has been having emesis, abdomen pain Acuity: Acute Type of Exam: Initial FINDINGS: AP and lateral skull demonstrate a left-sided ventriculostomy catheter. Catheter descends on the left and is intact. Chest x-ray demonstrates the catheter to the left of midline. The catheter is intact throughout the chest.  No focal abnormality within the lungs. Abdominal images demonstrate the catheter extending into the abdomen on the right. There is a loop of catheter in the right anterior abdomen with the tip extending into the pelvis. The course is unchanged. The catheter is intact with no evidence of kinking or fracture bowel gas pattern is nonspecific with mild retained colonic stool. Contrast in the renal collecting systems from earlier CT     Intact  shunt catheter extending into the low pelvis, similar to the previous study. No evidence of catheter kinking or fracture. XR SHUNT SERIES PLACEMENT (<4 VIEWS)    Result Date: 9/27/2021  EXAMINATION: SHUNT SERIES 9/26/2021 11:49 pm COMPARISON: 05/30/2020 HISTORY: ORDERING SYSTEM PROVIDED HISTORY: shunt eval TECHNOLOGIST PROVIDED HISTORY: Reason for exam:->shunt eval Reason for Exam: shunt evaluation FINDINGS: Left ventricular shunt with radiolucent connector in the left scalp. Catheter running down the left side of the head and neck.   Visualized portion of the catheter is intact along the skull and at the neck. Catheter continues down over the left chest, across the midline, and into the right upper abdomen. Catheter loops over the right upper abdomen and has the tip over the sacrum. No evidence of kinking or fragmentation. The position of the tip is not changed from the 05/30/2021 study. No obvious deformities are seen within the included bones other than the aileen holes at the skull. No definite consolidation in the lungs. No pleural effusion or pneumothorax. The bowel gas pattern is nonobstructive. Cholecystectomy clips are present in the right upper quadrant. 1.  Ventriculoperitoneal shunt catheter is intact with no kinking or fragmentation. The position of the tip is unchanged from the prior exam. 2.  No acute process seen in the chest and abdomen. PROCEDURE NOTE  Procedure: ultrasound guided peripheral IV placement  Indication: unable to obtain adequate peripheral IV access  The left antecubital fossa and forearm were prepped with chloraprep and tourniquet applied. Using a 22g angiocath, under direct ultrasound visualization and guidance, IV access was established and secured on first attempt. EBL 1 cc plus blood draw of 10 cc, the PIV withdrew blood easily and flushed easily. Tourniquet removed. Neurovascularly intact pre and post procedure. Patient Name: Teri Salazar   Medical Record Number: [id number]  Date: 10/13/2021   Time: 7:26 AM   Room/Bed: QC 04/04  Lumbar Puncture Procedure Note  Indication: to obtain spinal fluid for diagnostic testing, to rule out subarachnoid hemorrhage and possible meningitis    Consent: The patient was counseled regarding the procedure, it's indications, risks, potential complications and alternatives and any questions were answered. Consent was obtained. Procedure: The patient was placed in the sitting, supported by bed side stand, position and the appropriate landmarks were identified.  The area was prepped and draped in the usual sterile fashion. Anesthesia was obtained using 2 cc of 1% Lidocaine without epinephrine. A spinal needle was inserted at the L3- L4 level with the stylet in place until spinal fluid was returned. Opening pressure was not measured. At this point 5.0 cc of clear cerebral spinal fluid was obtained and sent for appropriate testing. The stylet was then replaced and the needle was withdrawn. A sterile dressing was placed over the site and the patient was placed in the supine position. The patient tolerated the procedure well. EBL less than 1 cc    Complications: None      ED COURSE/MDM  Patient seen and evaluated. Old records reviewed. Labs and imaging reviewed and results discussed with patient.    65-year-old female with abdominal pain, vomiting, initially did not plan to scan her given numerous CTs in the past and we discussed concerns for radiation exposure, but she is still having significant pain after multiple doses of Dilaudid, though she did not appear to be in any acute distress, I could not get her pain under control, so this is the primary reason for admission though she is concerned her symptoms are related to shunt malfunction/clog, shunt series x-rays okay, CT abdomen pelvis without acute process, she just had a head CT about a week ago so did not feel it necessary to repeat this since she was not having a significant headache today though she does have frequent headaches, no neuro deficits or current concern for intracranial hemorrhage or meningitis/encephalitis at this time, LP performed since she had concerns for these issues, reported brain bleed in the past and shunt infection in the past, low suspicion for this, though she did have elevated protein which could be related to shunt issues, explained she would need to speak to neurosurgery about this and they would determine course of action/intervention if necessary, vitals stable, low suspicion for sepsis or peritonitis at this time, though her white count had elevated compared to recent, normal lactate, I spoke to neurosurgery and hospitalist for [facility] and she was accepted for admission/observation stay and further evaluation, I did write another prn pain and nausea medication in case her transportation is delayed, patient verbalized understanding of plan and will be transported via EMS to [facility]. Orders Placed This Encounter   Procedures    Culture, CSF    COVID-19, Rapid    XR SHUNT SERIES PLACEMENT (<4 VIEWS)    CT ABDOMEN PELVIS W IV CONTRAST Additional Contrast? None    Lactic Acid, Plasma    CBC Auto Differential    Comprehensive Metabolic Panel w/ Reflex to MG    Lipase    Urinalysis Reflex to Culture    Urine Drug Screen    CSF Cell Count with Differential    CSF Cell Count with Differential    Glucose, CSF    Protein, CSF    Inpatient consult to Hospitalist     Orders Placed This Encounter   Medications    morphine (PF) injection 2 mg    ondansetron (ZOFRAN) injection 4 mg    lactated ringers bolus    HYDROmorphone (DILAUDID) injection 0.5 mg    DISCONTD: promethazine (PHENERGAN) 12.5mg in sodium chloride 0.9% 50 mL IVPB 12.5 mg    promethazine (PHENERGAN) 12.5 mg in sodium chloride 0.9 % 50 mL IVPB    HYDROmorphone (DILAUDID) injection 0.5 mg    ondansetron (ZOFRAN) injection 4 mg    iopamidol (ISOVUE-370) 76 % injection 100 mL    DISCONTD: haloperidol lactate (HALDOL) injection 5 mg    DISCONTD: diphenhydrAMINE (BENADRYL) injection 25 mg    HYDROmorphone (DILAUDID) injection 1 mg    HYDROmorphone (DILAUDID) injection 0.5 mg    promethazine (PHENERGAN) 12.5 mg in sodium chloride 0.9 % 50 mL IVPB     ED Course as of Oct 13 0726   Tue Oct 12, 2021   2114 Difficulty obtaining IV access, second nurse trying at this time.    [SY]   2214 Second nurse unable to get an IV but I was able to obtain IV using ultrasound.     [SY]   Wed Oct 13, 2021   0016 She reports she was aware that her white blood cell count was high before, not currently on any steroids. WBC(!): 15.3 [SY]   0016 Patient had received morphine prior to urine drug screen. Opiate Scrn, Ur(!): POSITIVE [SY]   0108 LP performed. Fluid sent. [SY]   9672 Patient is feeling a little better at this time.    [SY]   0425 Patient     [SY]   [phone] Patient agreeable to admission, she requests [facility], states Dr. Otf Andres goes there, her neurosurg. She declined [facility], reports she had a bad experience there.    [SY]   9725 I was planning on giving Haldol Benadryl to help with her abdominal pain since the Dilaudid is not lasting very long, but patient stated she had a severe allergy to this/anxiety \"freaks out\", but she was requesting more pain medicine, explained she would need to wait a little longer. I had originally ordered EKG to check QTC but since we will not be giving Haldol, canceled this as well.    [SY]   5260 [facility] no beds, she was agreeable to [facility] now. [SY]   [id number] I spoke to Dr. Haylie Loco neurosurgeon for [facility] after hospitalist requested I speak to them before admission, he did not have any requests for her at this time but was agreeable to consultation and let Dr. Otf Andres see her there.    [SY]   6351 Requesting pain meds again    [SY]      ED Course User Index  [SY] Ezra Buckner DO       CLINICAL IMPRESSION  1. Intractable abdominal pain    2. Intractable right upper quadrant abdominal pain    3. Non-intractable vomiting with nausea, unspecified vomiting type    4. Intermittent headache    5.  (ventriculoperitoneal) shunt status        Blood pressure 119/77, pulse 72, temperature 98.7 °F (37.1 °C), temperature source Oral, resp. rate 16, height 4' 11\" (1.499 m), weight 156 lb 8.4 oz (71 kg), last menstrual period 10/31/2016, SpO2 98 %, not currently breastfeeding. Mary Pastor was transferred to [facility] in stable condition.   Patient awaiting transport at shift change, discussed case with Dr. Nilton Moura since she would be awaiting transportation here.                   Simran Patel, DO  10/13/21 3431

## 2021-10-13 NOTE — TELEPHONE ENCOUNTER
Pt called stated that she went to the hospital last night and went to [facility] and stated that they did a lumbar puncture and that her white blood cell count is through the roof and that she was going to be transferred to another hospital but the only one that had any beds available was [facility] and when she got to [facility] and in her room that the doctor came in and told her that she was discharged and that nothing was wrong with her. She stated that she called [facility] and told them this and told her that she needs to go back to the hospital and stated that they were\"pissed\" that she got discharged. I told her that we have no pool on hospitals. She stated that she knew this and did not know what other hospital to go to. I told pt that it was up to her as to what hospital she goes too and that she should listen to the doctors at [address]  and if they told her that she needed to go back to the hospital that she should.

## 2021-10-13 NOTE — ED NOTES
Consent obtained for Lumbar puncture procedure. Pt sits on side of bed brigette procedure well specimens obtained and taken to lab. Pt HOB flat with instruction to remain flat x 30 mins. Pt verbalized understanding.      Ann Marie Dominguez RN  10/13/21 4545

## 2021-10-13 NOTE — ED NOTES
RN went into do a repeat set of vitals, pt asking for medication, RN explained since transport is here she cannot have IV narcotics , RN explained she will tell The nurse at [facility] of her pain medication request      Daly Green RN  10/13/21 1665

## 2021-10-13 NOTE — ED NOTES
Pt called out asking for more medication, per Dr. Abraham Louder she cannot have anything until 930, pt updated      Cordell Bell RN  10/13/21 7655

## 2021-10-14 NOTE — H&P
Hospital Medicine History & Physical      PCP: FREDRICK Goss - CNP    Date of Admission: 10/13/2021    Date of Service: Pt seen/examined on 10/13/2021    Chief Complaint:    Abdominal pain    History Of Present Illness:     57-year-old female with past medical history of congenital hydrocephalus status post  shunt placement was transferred to [facility] from [address] [place] [address] ER due to concern about shunt malfunction, abdominal pain. Patient has been complaining of wake symptoms which have been going on for months and have not been improving. She reinitiate presented with abdominal pain, right upper quadrant pain. She was also complaining of headaches. Patient has CSF analysis performed which was elevated proteins and due to concern about shunt malfunction she was transferred here. Next          Past Medical History:        Diagnosis Date    ADHD (attention deficit hyperactivity disorder) 82    Depression with anxiety     Diabetes mellitus ([address]     pre-diabetes    Difficult intubation     Encounter for imaging to screen for metal prior to MRI 06/01/2021    MRI Conditional [employer]onic Non-Programmable shunt model#[id number] implanted 10/30/2020 at [facility]. Normal Mode. 1.5T or 3.0T.    ESBL (extended spectrum beta-lactamase) producing bacteria infection 11/06/2019    urine    Functional ovarian cysts 2008    rt ovary cyst x 2 yrs.     Headache(784.0)     migraines    History of blood transfusion     at birth   Santosh Claudio History of kidney stones     History of PCOS     Hydrocephalus ([address]     Hyperlipidemia     Irritable bowel syndrome 2004    had colonoscopy about 6 yrs ago    Meningitis 21/9666    Neutrophilic leukocytosis     Nicotine dependence     PONV (postoperative nausea and vomiting)     very nauseated and sometimes wakes up with a Migraine--happened once after brain surgery    Primary osteoarthritis of left knee 07/01/2016    S/P cone biopsy of cervix 2004    Scoliosis .s     (ventriculoperitoneal) shunt status     hydrocephalus f/w Neurosurgeon at [place] [address]  (ventriculoperitoneal) shunt status     Wears glasses     reading       Past Surgical History:        Procedure Laterality Date    ABDOMEN SURGERY N/A 2021    REMOVAL OF ABDOMINAL WALL MASS performed by Taj Montiel MD at [address] [place]      appendicitis     SECTION  2005    placenta previa    CHOLECYSTECTOMY      COLONOSCOPY  2004    COLPOSCOPY      CSF SHUNT      replaced at age 17    CYSTOSCOPY      stone removal    HEMORRHOID SURGERY      HERNIA REPAIR Bilateral     inguinal    HERNIA REPAIR      hiatal hernia    HYSTERECTOMY, TOTAL ABDOMINAL  2016    TAHBSO, adhesions/PCOS    KNEE ARTHROSCOPY Left 2015    medial meniscectomy, chondroplasty, plica resection    LITHOTRIPSY Bilateral 12/10/2019    OTHER SURGICAL HISTORY  10/19/2016    op lap    VENTRICULOPERITONEAL SHUNT      multiple revisions, most recent        Medications Prior to Admission:    Prior to Admission medications    Medication Sig Start Date End Date Taking? Authorizing Provider   promethazine (PHENERGAN) 25 MG tablet Take 1 tablet by mouth every 6 hours as needed for Nausea WARNING:  May cause drowsiness. May impair ability to operate vehicles or machinery. Do not use in combination with alcohol. 10/13/21 10/16/21 Yes Matti Carrillo MD   HYDROcodone-acetaminophen (NORCO) 5-325 MG per tablet Take 1 tablet by mouth every 4 hours as needed for Pain for up to 3 days. Intended supply: 3 days.  Take lowest dose possible to manage pain 10/13/21 10/16/21 Yes Matti Carrillo MD   ALPRAZolam Jackqueline Js) 1 MG tablet  21   Historical Provider, MD   gabapentin (NEURONTIN) 100 MG capsule  10/2/21   Historical Provider, MD   metoclopramide (REGLAN) 10 MG tablet  21   Historical Provider, MD   neomycin-polymyxin-dexameth 3.5-24386-3.1 OINT  21   Historical Provider, MD oxyCODONE (ROXICODONE) 5 MG immediate release tablet  8/8/21   Historical Provider, MD   VYVANSE 50 MG capsule  9/21/21   Historical Provider, MD   cetirizine (ZYRTEC) 10 MG tablet Take 1 tablet by mouth daily 9/27/21 10/27/21  Edu Miller MD   Hyoscyamine Sulfate SL (LEVSIN/SL) 0.125 MG SUBL Place 0.125 mg under the tongue every 4 hours as needed (abdominal pain) 9/17/21   Pearlean Dakin, APRN - CNP   ALPRAZolam Estela Close) 0.5 MG tablet Take 0.5 mg by mouth daily as needed. 7/27/21   Historical Provider, MD   simvastatin (ZOCOR) 10 MG tablet Take 1 tablet by mouth nightly 7/8/21   FREDRICK Ureña CNP       Allergies:  Bee venom, Bentyl [dicyclomine hcl], Dicyclomine, Ketorolac tromethamine, [place], Mushroom extract complex, Oxycodone-acetaminophen, Sulfa antibiotics, Vancomycin, Adhesive tape, Dicyclomine hcl, Haldol [haloperidol], Hydrocodone, Silicone, Sulfacetamide, Acetaminophen, Butalbital-apap-caff-cod, Ceftaroline, Daptomycin, Ketamine, Levaquin [levofloxacin], Methocarbamol, Prochlorperazine, Tazobactam, and Zosyn [piperacillin sod-tazobactam so]    Social History:       reports that she has been smoking cigarettes. She has a 9.00 pack-year smoking history. She has never used smokeless tobacco. She reports that she does not drink alcohol and does not use drugs.     Family History:  Reviewed in detail and Positive as follows:        Problem Relation Age of Onset    High Blood Pressure Mother     Diabetes Mother     High Cholesterol Mother     Depression Mother     Diabetes Maternal Grandmother     High Blood Pressure Maternal Grandmother     High Cholesterol Maternal Grandmother     Heart Disease Maternal Grandfather     High Blood Pressure Maternal Grandfather     Heart Disease Paternal Grandmother     Stroke Paternal Grandfather     Depression Sister     Cirrhosis Father     Rheum Arthritis Neg Hx     Osteoarthritis Neg Hx     Asthma Neg Hx     Breast Cancer Neg Hx     Cancer Neg Hx     Heart Failure Neg Hx     Hypertension Neg Hx     Migraines Neg Hx     Ovarian Cancer Neg Hx     Rashes/Skin Problems Neg Hx     Seizures Neg Hx     Thyroid Disease Neg Hx        REVIEW OF SYSTEMS:   Positive review  noted in the HPI. All other systems reviewed and negative. PHYSICAL EXAM:    [facility] 10/31/2016 (Exact Date)   General Appearance: alert and oriented to person, place and time, well developed and well- nourished, in no acute distress  Skin: warm and dry, no rash or erythema  Head: normocephalic and atraumatic  Eyes: pupils equal, round, and reactive to light, extraocular eye movements intact, conjunctivae normal  ENT: tympanic membrane, external ear and ear canal normal bilaterally, nose without deformity, nasal mucosa and turbinates normal without polyps  Neck: supple and non-tender without mass, no thyromegaly or thyroid nodules, no cervical lymphadenopathy  Pulmonary/Chest: clear to auscultation bilaterally- no wheezes, rales or rhonchi, normal air movement, no respiratory distress  Cardiovascular: normal rate, regular rhythm, normal S1 and S2, no murmurs, rubs, clicks, or gallops, Peripheral pulses good, Cap refill <3 sec, no carotid bruits  Abdomen: soft, non-tender, non-distended, normal bowel sounds, no masses or organomegaly  Extremities: no cyanosis, clubbing or edema  Musculoskeletal: normal range of motion, no joint swelling, deformity or tenderness  Neurologic: reflexes normal and symmetric, no cranial nerve deficit, gait, coordination and speech normal      LABS:    CBC   Recent Labs     10/12/21  2228   WBC 15.3*   HGB 15.8   HCT 46.4         RENAL  Recent Labs     10/12/21  2228      K 3.7      CO2 27   BUN 12   CREATININE 0.6     LFT'S  Recent Labs     10/12/21  2228   AST 8*   ALT 14   BILITOT 0.3   ALKPHOS 113     COAG  No results for input(s): INR in the last 72 hours.   CARDIAC ENZYMES  No results for input(s): CKTOTAL, CKMB, CKMBINDEX, TROPONINI in the last 72 hours. U/A:    Lab Results   Component Value Date    NITRITE neg 10/25/2019    COLORU Yellow 10/12/2021    WBCUA 7 08/31/2021    RBCUA 2 08/31/2021    MUCUS 1+ 02/19/2020    BACTERIA 1+ 08/31/2021    CLARITYU Clear 10/12/2021    SPECGRAV 1.015 10/12/2021    LEUKOCYTESUR Negative 10/12/2021    BLOODU Negative 10/12/2021    GLUCOSEU Negative 10/12/2021    GLUCOSEU NEGATIVE 10/08/2011    AMORPHOUS Rare 03/21/2017       ABG  No results found for: NSV9VTT, BEART, D3JUYIVN, PHART, THGBART, CSI3BCE, PO2ART, QSP7QHD    UA:  Recent Labs     10/12/21  2320   COLORU Yellow   PHUR 6.0  6.0   CLARITYU Clear   SPECGRAV 1.015   LEUKOCYTESUR Negative   UROBILINOGEN 0.2   BILIRUBINUR Negative   BLOODU Negative   GLUCOSEU Negative   KETUA Negative       Microbiology:  Recent Labs     10/12/21  0100   LABGRAM No organisms seen  WBC's (Mononuclear) present  WBC's (Polymorphonuclear) present       Nasal Culture: No results for input(s): ORG, MRSAPCR in the last 72 hours. Blood Culture: No results for input(s): BC, BLOODCULT2, ORG in the last 72 hours. Fungal Culture:   No results for input(s): FUNGSM in the last 72 hours. No results for input(s): FUNCXBLD in the last 72 hours. CSF Culture:  Recent Labs     10/12/21  0100   COLORCSF Colorless  Colorless   APPEARCSF Clear  Clear   CFTUBE Tube 1  Tube 4   CLOTCSF see below  see below   WBCCSF 1  2   RBCCSF 3*  0   GLUCCSF 62   VOLCSF 5.0     Respiratory Culture:  Recent Labs     10/12/21  0100   LABGRAM No organisms seen  WBC's (Mononuclear) present  WBC's (Polymorphonuclear) present       AFB:No results for input(s): AFBSMEAR in the last 72 hours. Urine Culture  No results for input(s): LABURIN in the last 72 hours.     RADIOLOGY:    No orders to display       Previous medical records personally reviewed and analyzed         PHYSICIAN CERTIFICATION    I certify that Hunter Almeida is expected to be hospitalized for <2 midnights based on the following assessment and plan:    ASSESSMENT/PLAN:  Active Hospital Problems    Diagnosis Date Noted    Intractable abdominal pain [R10.9] 10/13/2021       Abdominal pain-functional  Congenital hydrocephalus status post  shunt    Plan:  -Discussed with neurosurgery, nothing needs to be done about the  shunt as it is functioning okay  -Continue symptomatic management for pain. Outpatient follow-up with GI for further work-up    Diet: regular  Code Status: full    Dispo -discharge today       Sam Valverde MD  The note was completed using EMR. Every effort was made to ensure accuracy; however, inadvertent computerized transcription errors may be present. Thank you FREDRICK Ureña CNP for the opportunity to be involved in this patient's care. If you have any questions or concerns please feel free to contact me at [phone].

## 2021-10-15 NOTE — DISCHARGE SUMMARY
Hospitalist Discharge Summary    Patient ID:  Fahad Serrano  [id number]  46 y.o.  1980    Admit date: 10/13/2021    Discharge date: 10/13/2021    Disposition: home    Admission Diagnoses:   Patient Active Problem List   Diagnosis     (ventriculoperitoneal) shunt status    Scoliosis    Prediabetes    Tobacco smoker    Chronic nonintractable headache    Onychomycosis    Congenital hydrocephalus (HCC)    Viral meningitis    Pyuria    Hypokalemia    Sepsis (HCC)    Meningitis    Allergy to multiple antibiotics    Ureteritis    Acute cystitis without hematuria    Urinary retention    Guzman catheter in place    Lactic acidosis    Neutrophilic leukocytosis    Complicated UTI (urinary tract infection)    Nausea & vomiting    Renal colic    Volume depletion    Headache    Herpes zoster    Intractable migraine with aura without status migrainosus    History of brain shunt    Shunt malfunction    Abdominal wall mass    Cellulitis of abdominal wall    Intractable abdominal pain       Discharge Diagnoses: Active Problems:    Intractable abdominal pain  Resolved Problems:    * No resolved hospital problems. *      Code Status:  Prior    Condition:  Stable    Discharge Diet: Diet:  No diet orders on file    PCP to do list: Follow-up for improvement of symptoms    Hospital Course: 72-year-old female with past medical history of congenital hydrocephalus status post  shunt placement was transferred to [facility] from [address] [place] [address] due to concern about shunt malfunction, abdominal pain. Patient has been complaining of wake symptoms which have been going on for months and have not been improving. She reinitiate presented with abdominal pain, right upper quadrant pain. She was also complaining of headaches.   Patient has CSF analysis performed which was elevated proteins and due to concern about shunt malfunction she was transferred here.     Abdominal nightly, Disp-90 tablet, R-1Normal       !! - Potential duplicate medications found. Please discuss with provider. Discharge Medication List as of 10/13/2021 11:31 AM      STOP taking these medications       predniSONE (DELTASONE) 50 MG tablet Comments:   Reason for Stopping:         ondansetron (ZOFRAN ODT) 4 MG disintegrating tablet Comments:   Reason for Stopping:                   Procedures: none    Assessment on Discharge: Stable, improved     Discharge ROS:  A complete review of systems was asked and negative except for none    Discharge Exam:  LMP 10/31/2016 (Exact Date)     Gen: NAD  HEENT: NC/AT, moist mucous membranes, no oropharyngeal erythema or exudate  Neck: supple, trachea midline, no anterior cervical or SC LAD  Heart:  Normal s1/s2, RRR, no murmurs, gallops, or rubs. no leg edema  Lungs:  CTA bilaterally, no wheeze,no rales or rhonchi, no use of accessory muscles  Abd: bowel sounds present, soft, nontender, nondistended, no masses  Extrem:  No clubbing, cyanosis,  no edema  Skin: no lesion or masses  Psych:  A & O x3  Neuro: grossly intact, moves all four extremities    Pertinent Studies During Hospital Stay:  Radiology:  CT HEAD WO CONTRAST    Result Date: 10/7/2021  EXAMINATION: CT OF THE HEAD WITHOUT CONTRAST  10/7/2021 4:22 pm TECHNIQUE: CT of the head was performed without the administration of intravenous contrast. Dose modulation, iterative reconstruction, and/or weight based adjustment of the mA/kV was utilized to reduce the radiation dose to as low as reasonably achievable. COMPARISON: 09/26/2021 HISTORY: ORDERING SYSTEM PROVIDED HISTORY: left sided head injury TECHNOLOGIST PROVIDED HISTORY: Reason for exam:->left sided head injury Reason for exam:->has a shunt, shunt series yesterday normal Has a \"code stroke\" or \"stroke alert\" been called? ->No Decision Support Exception - unselect if not a suspected or confirmed emergency medical condition->Emergency Medical Condition (MA) Is the patient pregnant?->No Reason for Exam: PT. STATES YESTERDAY SHE PASSED OUT LOC AND HIT HEAD C/O RADIATING PAIN AT AREA WHERE SHUNT IS AND RADIATING PAIN DOWN BACK NECK Acuity: Acute Type of Exam: Initial Mechanism of Injury: PASSED OUT LOC Relevant Medical/Surgical History: HX  SHUNT, HX CSF SHUNT FINDINGS: BRAIN/VENTRICLES: There is no acute intracranial hemorrhage, mass effect or midline shift. No abnormal extra-axial fluid collection. The gray-white differentiation is maintained without evidence of an acute infarct. There is no evidence of hydrocephalus. Left-sided ventriculostomy catheter with tip in the region of the 3rd ventricle is stable in position. ORBITS: The visualized portion of the orbits demonstrate no acute abnormality. SINUSES: The visualized paranasal sinuses and mastoid air cells demonstrate no acute abnormality. SOFT TISSUES/SKULL:  No acute abnormality of the visualized skull or soft tissues. There is redemonstration of a aileen hole in the right frontal region. No acute intracranial abnormality. CT HEAD WO CONTRAST    Result Date: 9/27/2021  EXAMINATION: CT OF THE HEAD WITHOUT CONTRAST  9/26/2021 11:20 pm TECHNIQUE: CT of the head was performed without the administration of intravenous contrast. Dose modulation, iterative reconstruction, and/or weight based adjustment of the mA/kV was utilized to reduce the radiation dose to as low as reasonably achievable. COMPARISON: A 09/18/2021 and 08/17/2021 HISTORY: ORDERING SYSTEM PROVIDED HISTORY: shunt eval TECHNOLOGIST PROVIDED HISTORY: Reason for exam:->shunt eval Has a \"code stroke\" or \"stroke alert\" been called? ->No Decision Support Exception - unselect if not a suspected or confirmed emergency medical condition->Emergency Medical Condition (MA) Is the patient pregnant?->No Reason for Exam: shunt eval; pt presents with facial swelling FINDINGS: BRAIN/VENTRICLES: There is no acute intracranial hemorrhage, mass effect or midline shift. ventriculostomy shunt catheter in place with slitlike ventricles, unchanged. CT CERVICAL SPINE WO CONTRAST    Result Date: 10/7/2021  EXAMINATION: CT OF THE CERVICAL SPINE WITHOUT CONTRAST 10/7/2021 4:22 pm TECHNIQUE: CT of the cervical spine was performed without the administration of intravenous contrast. Multiplanar reformatted images are provided for review. Dose modulation, iterative reconstruction, and/or weight based adjustment of the mA/kV was utilized to reduce the radiation dose to as low as reasonably achievable. COMPARISON: None. HISTORY: ORDERING SYSTEM PROVIDED HISTORY: fall head injury TECHNOLOGIST PROVIDED HISTORY: Reason for exam:->fall head injury Decision Support Exception - unselect if not a suspected or confirmed emergency medical condition->Emergency Medical Condition (MA) Is the patient pregnant?->No Reason for Exam: PT. STATES SHE PASSED  OUT YESTERDAY LOC AND HIT HEAD  C/O RADIATING  PAIN  [address] IS  AND RADIATING PAIN DOWN BACK NECK Acuity: Acute Type of Exam: Initial Mechanism of Injury: PASSED OUT LOC Relevant Medical/Surgical History: HX SCOLIOSIS, PT. NOT ABLE TO REMOVE PIERCING FROM NOSE FINDINGS: BONES/ALIGNMENT: Trace reversal of the cervical lordosis. Alignment is within normal limits. Vertebral body heights appear maintained. Intervertebral disc heights appear generally maintained. Posterior elements appear intact. DEGENERATIVE CHANGES: No significant degenerative changes. SOFT TISSUES: There is no prevertebral soft tissue swelling. Prominent adenoids and palatine tonsils. Partially visualized ventriculoperitoneal shunt catheter. 1. No acute fracture. No listhesis. 2. Other findings as described.      CT ABDOMEN PELVIS W IV CONTRAST Additional Contrast? None    Result Date: 10/13/2021  EXAMINATION: CT OF THE ABDOMEN AND PELVIS WITH CONTRAST 10/13/2021 3:31 am TECHNIQUE: CT of the abdomen and pelvis was performed with the administration of intravenous contrast. bile duct, most likely related to post cholecystectomy state. 3 mm calculus in the right renal pelvis without hydronephrosis or hydroureter. CT ABDOMEN PELVIS W IV CONTRAST Additional Contrast? None    Result Date: 10/6/2021  EXAMINATION: CT OF THE ABDOMEN AND PELVIS WITH CONTRAST 10/6/2021 12:49 pm TECHNIQUE: CT of the abdomen and pelvis was performed with the administration of intravenous contrast. Multiplanar reformatted images are provided for review. Dose modulation, iterative reconstruction, and/or weight based adjustment of the mA/kV was utilized to reduce the radiation dose to as low as reasonably achievable. COMPARISON: CT abdomen and pelvis dated 09/12/2021. HISTORY: ORDERING SYSTEM PROVIDED HISTORY: abdominal pain TECHNOLOGIST PROVIDED HISTORY: Reason for exam:->abdominal pain Additional Contrast?->None Decision Support Exception - unselect if not a suspected or confirmed emergency medical condition->Emergency Medical Condition (MA) Reason for Exam: right side abdominal pain and swelling for over a moth Acuity: Acute Type of Exam: Initial Relevant Medical/Surgical History: abdominal wall mass removal 7.14.21 appy cholecystectomy hysterectomy hernia repair  shunt FINDINGS: CARDIOVASCULAR: The heart is normal in size. There is no pericardial effusion. The aorta and branch vessels are patent and normal in caliber. LUNG BASES: There are no focal consolidations or pleural effusions. 4 mm pulmonary nodule noted in the left lower lobe for which no routine follow-up is recommended. HEPATOBILIARY: The liver is normal in size. There are no focal hepatic lesions. There is no abnormal biliary ductal dilatation. The gallbladder is surgically absent. SPLEEN: Unremarkable. PANCREAS: Unremarkable ADRENAL GLANDS: Unremarkable. KIDNEYS: Kidneys are normal in size and contour and demonstrate symmetric enhancement. There is no hydronephrosis.   There is redemonstration of a 3 mm nonobstructing calculus in the inferior pole of the right kidney. ABDOMINAL NODES: No adenopathy is appreciated. PELVIC ORGANS: The urinary bladder is unremarkable. The uterus is surgically absent. PERITONEUM/MESENTERY/BOWEL: The stomach is unremarkable. There is no bowel obstruction. There is no bowel wall thickening. The appendix is surgically absent. There is diverticulosis without evidence of diverticulitis. Ventriculoperitoneal shunt is noted terminating in the lower mid abdomen. BONES/SOFT TISSUES: There is no acute osseous or soft tissue abnormality. No CT evidence of acute intra-abdominal pathology. Unchanged 3 mm nonobstructing right renal calculus. XR SHUNT SERIES PLACEMENT (<4 VIEWS)    Result Date: 10/13/2021  EXAMINATION: SHUNT SERIES 10/13/2021 3:33 am COMPARISON: 10/06/2021 HISTORY: ORDERING SYSTEM PROVIDED HISTORY:  shunt, vomiting, abd pain TECHNOLOGIST PROVIDED HISTORY: Reason for exam:-> shunt, vomiting, abd pain Reason for Exam: Abdominal Pain x1 month RUQ pain, intermittant n/v Pt states she has been vomiting x 1 month Acuity: Acute Type of Exam: Initial FINDINGS: A left-sided shunt catheter is again seen. The catheter again courses through the anterior left chest into the right upper quadrant where the catheter is again seen to loop before coursing into the left side of the pelvis. No definite kink or discontinuity is identified. The lungs are clear. The cardiomediastinal silhouette is normal.  There is no pleural effusion or pneumothorax. The bowel gas pattern is nonobstructive. No abnormality in the shunt catheter. XR SHUNT SERIES PLACEMENT (<4 VIEWS)    Result Date: 10/6/2021  EXAMINATION: SHUNT SERIES 10/6/2021 1:18 pm COMPARISON: 09/26/2021. AP and lateral skull demonstrate a left-sided ventriculostomy catheter. Catheter descends on the left and is intact.  HISTORY: ORDERING SYSTEM PROVIDED HISTORY: shunt in place, light headedness TECHNOLOGIST PROVIDED HISTORY: Reason for exam:->shunt in pneumothorax. The bowel gas pattern is nonobstructive. Cholecystectomy clips are present in the right upper quadrant. 1.  Ventriculoperitoneal shunt catheter is intact with no kinking or fragmentation. The position of the tip is unchanged from the prior exam. 2.  No acute process seen in the chest and abdomen. Last Labs on Discharge:     No results found for this or any previous visit (from the past 24 hour(s)). Follow up: with FREDRICK Blackman CNP    Note that over 30 minutes was spent in preparing discharge papers, discussing discharge with patient, medication review, etc.    Thank you FREDRICK Blackman CNP for the opportunity to be involved in this patient's care. If you have any questions or concerns please feel free to contact me at [phone].     Electronically signed by Emely Gore MD on 10/15/2021 at 1:10 PM

## 2021-10-20 LAB
CSF CULTURE: NORMAL
GRAM STAIN RESULT: NORMAL

## 2021-10-26 ENCOUNTER — HOSPITAL ENCOUNTER (EMERGENCY)
Age: 41
Discharge: HOME | End: 2021-10-26
Attending: EMERGENCY MEDICINE
Payer: COMMERCIAL

## 2021-10-26 VITALS
RESPIRATION RATE: 17 BRPM | HEART RATE: 80 BPM | WEIGHT: 155.42 LBS | TEMPERATURE: 97.9 F | BODY MASS INDEX: 31.39 KG/M2 | DIASTOLIC BLOOD PRESSURE: 80 MMHG | OXYGEN SATURATION: 96 % | SYSTOLIC BLOOD PRESSURE: 124 MMHG

## 2021-10-26 DIAGNOSIS — R10.10 CHRONIC UPPER ABDOMINAL PAIN: Primary | ICD-10-CM

## 2021-10-26 DIAGNOSIS — G89.29 CHRONIC UPPER ABDOMINAL PAIN: Primary | ICD-10-CM

## 2021-10-26 DIAGNOSIS — H57.89 EYE SWELLING, BILATERAL: ICD-10-CM

## 2021-10-26 LAB
A/G RATIO: 1.5 (ref 1.1–2.2)
ALBUMIN SERPL-MCNC: 4.3 G/DL (ref 3.4–5)
ALP BLD-CCNC: 152 U/L (ref 40–129)
ALT SERPL-CCNC: 102 U/L (ref 10–40)
ANION GAP SERPL CALCULATED.3IONS-SCNC: 9 MMOL/L (ref 3–16)
AST SERPL-CCNC: 39 U/L (ref 15–37)
BASOPHILS ABSOLUTE: 0.1 K/UL (ref 0–0.2)
BASOPHILS RELATIVE PERCENT: 1 %
BILIRUB SERPL-MCNC: 0.5 MG/DL (ref 0–1)
BILIRUBIN URINE: NEGATIVE
BLOOD, URINE: NEGATIVE
BUN BLDV-MCNC: 12 MG/DL (ref 7–20)
CALCIUM SERPL-MCNC: 9.8 MG/DL (ref 8.3–10.6)
CHLORIDE BLD-SCNC: 104 MMOL/L (ref 99–110)
CLARITY: CLEAR
CO2: 28 MMOL/L (ref 21–32)
COLOR: NORMAL
CREAT SERPL-MCNC: <0.5 MG/DL (ref 0.6–1.1)
EKG ATRIAL RATE: 92 BPM
EKG DIAGNOSIS: NORMAL
EKG P AXIS: 41 DEGREES
EKG P-R INTERVAL: 132 MS
EKG Q-T INTERVAL: 380 MS
EKG QRS DURATION: 82 MS
EKG QTC CALCULATION (BAZETT): 471 MS
EKG R AXIS: -6 DEGREES
EKG T AXIS: 19 DEGREES
EKG VENTRICULAR RATE: 92 BPM
EOSINOPHILS ABSOLUTE: 0.2 K/UL (ref 0–0.6)
EOSINOPHILS RELATIVE PERCENT: 2.7 %
GFR AFRICAN AMERICAN: >60
GFR NON-AFRICAN AMERICAN: >60
GLOBULIN: 2.9 G/DL
GLUCOSE BLD-MCNC: 133 MG/DL (ref 70–99)
GLUCOSE URINE: NEGATIVE MG/DL
GONADOTROPIN, CHORIONIC (HCG) QUANT: <5 MIU/ML
HCT VFR BLD CALC: 40.2 % (ref 36–48)
HEMOGLOBIN: 14 G/DL (ref 12–16)
KETONES, URINE: NEGATIVE MG/DL
LEUKOCYTE ESTERASE, URINE: NEGATIVE
LIPASE: 15 U/L (ref 13–60)
LYMPHOCYTES ABSOLUTE: 2.6 K/UL (ref 1–5.1)
LYMPHOCYTES RELATIVE PERCENT: 31.8 %
MAGNESIUM: 2.1 MG/DL (ref 1.8–2.4)
MCH RBC QN AUTO: 30 PG (ref 26–34)
MCHC RBC AUTO-ENTMCNC: 34.9 G/DL (ref 31–36)
MCV RBC AUTO: 85.8 FL (ref 80–100)
MICROSCOPIC EXAMINATION: NORMAL
MONOCYTES ABSOLUTE: 0.4 K/UL (ref 0–1.3)
MONOCYTES RELATIVE PERCENT: 5.1 %
NEUTROPHILS ABSOLUTE: 4.8 K/UL (ref 1.7–7.7)
NEUTROPHILS RELATIVE PERCENT: 59.4 %
NITRITE, URINE: NEGATIVE
PDW BLD-RTO: 13.9 % (ref 12.4–15.4)
PH UA: 6.5 (ref 5–8)
PLATELET # BLD: 275 K/UL (ref 135–450)
PMV BLD AUTO: 7.8 FL (ref 5–10.5)
POTASSIUM REFLEX MAGNESIUM: 3.2 MMOL/L (ref 3.5–5.1)
PROTEIN UA: NEGATIVE MG/DL
RBC # BLD: 4.68 M/UL (ref 4–5.2)
SODIUM BLD-SCNC: 141 MMOL/L (ref 136–145)
SPECIFIC GRAVITY UA: <=1.005 (ref 1–1.03)
TOTAL PROTEIN: 7.2 G/DL (ref 6.4–8.2)
URINE REFLEX TO CULTURE: NORMAL
URINE TYPE: NORMAL
UROBILINOGEN, URINE: 0.2 E.U./DL
WBC # BLD: 8 K/UL (ref 4–11)

## 2021-10-26 PROCEDURE — 84702 CHORIONIC GONADOTROPIN TEST: CPT

## 2021-10-26 PROCEDURE — 85025 COMPLETE CBC W/AUTO DIFF WBC: CPT

## 2021-10-26 PROCEDURE — 99284 EMERGENCY DEPT VISIT MOD MDM: CPT

## 2021-10-26 PROCEDURE — 81003 URINALYSIS AUTO W/O SCOPE: CPT

## 2021-10-26 PROCEDURE — 6370000000 HC RX 637 (ALT 250 FOR IP): Performed by: EMERGENCY MEDICINE

## 2021-10-26 PROCEDURE — 96375 TX/PRO/DX INJ NEW DRUG ADDON: CPT

## 2021-10-26 PROCEDURE — 83690 ASSAY OF LIPASE: CPT

## 2021-10-26 PROCEDURE — 2580000003 HC RX 258: Performed by: EMERGENCY MEDICINE

## 2021-10-26 PROCEDURE — 6360000002 HC RX W HCPCS: Performed by: EMERGENCY MEDICINE

## 2021-10-26 PROCEDURE — 96374 THER/PROPH/DIAG INJ IV PUSH: CPT

## 2021-10-26 PROCEDURE — 36415 COLL VENOUS BLD VENIPUNCTURE: CPT

## 2021-10-26 PROCEDURE — 80053 COMPREHEN METABOLIC PANEL: CPT

## 2021-10-26 PROCEDURE — 93005 ELECTROCARDIOGRAM TRACING: CPT | Performed by: EMERGENCY MEDICINE

## 2021-10-26 PROCEDURE — 83735 ASSAY OF MAGNESIUM: CPT

## 2021-10-26 PROCEDURE — 93010 ELECTROCARDIOGRAM REPORT: CPT | Performed by: INTERNAL MEDICINE

## 2021-10-26 RX ORDER — ONDANSETRON 4 MG/1
4 TABLET, ORALLY DISINTEGRATING ORAL 3 TIMES DAILY PRN
Qty: 12 TABLET | Refills: 0 | Status: SHIPPED | OUTPATIENT
Start: 2021-10-26 | End: 2021-10-27 | Stop reason: ALTCHOICE

## 2021-10-26 RX ORDER — SODIUM CHLORIDE, SODIUM LACTATE, POTASSIUM CHLORIDE, AND CALCIUM CHLORIDE .6; .31; .03; .02 G/100ML; G/100ML; G/100ML; G/100ML
1000 INJECTION, SOLUTION INTRAVENOUS ONCE
Status: COMPLETED | OUTPATIENT
Start: 2021-10-26 | End: 2021-10-26

## 2021-10-26 RX ORDER — MORPHINE SULFATE 2 MG/ML
2 INJECTION, SOLUTION INTRAMUSCULAR; INTRAVENOUS ONCE
Status: COMPLETED | OUTPATIENT
Start: 2021-10-26 | End: 2021-10-26

## 2021-10-26 RX ORDER — PROMETHAZINE HYDROCHLORIDE 25 MG/ML
12.5 INJECTION, SOLUTION INTRAMUSCULAR; INTRAVENOUS ONCE
Status: COMPLETED | OUTPATIENT
Start: 2021-10-26 | End: 2021-10-26

## 2021-10-26 RX ORDER — HYDROCODONE BITARTRATE AND ACETAMINOPHEN 5; 325 MG/1; MG/1
1 TABLET ORAL ONCE
Status: COMPLETED | OUTPATIENT
Start: 2021-10-26 | End: 2021-10-26

## 2021-10-26 RX ADMIN — SODIUM CHLORIDE, POTASSIUM CHLORIDE, SODIUM LACTATE AND CALCIUM CHLORIDE 1000 ML: 600; 310; 30; 20 INJECTION, SOLUTION INTRAVENOUS at 08:02

## 2021-10-26 RX ADMIN — PROMETHAZINE HYDROCHLORIDE 12.5 MG: 25 INJECTION INTRAMUSCULAR; INTRAVENOUS at 08:03

## 2021-10-26 RX ADMIN — MORPHINE SULFATE 2 MG: 2 INJECTION, SOLUTION INTRAMUSCULAR; INTRAVENOUS at 08:07

## 2021-10-26 RX ADMIN — HYDROCODONE BITARTRATE AND ACETAMINOPHEN 1 TABLET: 5; 325 TABLET ORAL at 10:12

## 2021-10-26 ASSESSMENT — PAIN DESCRIPTION - FREQUENCY: FREQUENCY: CONTINUOUS

## 2021-10-26 ASSESSMENT — ENCOUNTER SYMPTOMS
ABDOMINAL PAIN: 1
NAUSEA: 1
RESPIRATORY NEGATIVE: 1
EYE DISCHARGE: 0
EYE REDNESS: 0
SORE THROAT: 0
VOMITING: 1
DIARRHEA: 1
PHOTOPHOBIA: 0
ABDOMINAL DISTENTION: 0
EYE PAIN: 0
SHORTNESS OF BREATH: 0

## 2021-10-26 ASSESSMENT — PAIN - FUNCTIONAL ASSESSMENT: PAIN_FUNCTIONAL_ASSESSMENT: 0-10

## 2021-10-26 ASSESSMENT — PAIN DESCRIPTION - LOCATION: LOCATION: ABDOMEN

## 2021-10-26 ASSESSMENT — PAIN SCALES - GENERAL
PAINLEVEL_OUTOF10: 7
PAINLEVEL_OUTOF10: 9
PAINLEVEL_OUTOF10: 8
PAINLEVEL_OUTOF10: 7
PAINLEVEL_OUTOF10: 5

## 2021-10-26 ASSESSMENT — PAIN DESCRIPTION - DESCRIPTORS: DESCRIPTORS: SHARP

## 2021-10-26 ASSESSMENT — PAIN DESCRIPTION - PAIN TYPE: TYPE: ACUTE PAIN

## 2021-10-26 NOTE — ED NOTES
Dc'd to home  Aware to follow up with pmd as scheduled  To return for any worsening or changes  Gait steady  Sister coming to pick her up     Derian Saavedra RN  10/26/21 [id number]

## 2021-10-26 NOTE — ED PROVIDER NOTES
Physician-In-Triage Note     As physician-in-triage, I performed a medical screening examination and evaluated this patient briefly with the purpose of initiating their ED workup in an expeditious manner. Please see notes from other ED providers regarding comprehensive evaluation including full history, physical exam, interpretation of results, and medical decision making/disposition. HISTORY OF PRESENT Andrew Colvin is a 46 y.o. female who presents to the ED for evaluation of nausea vomiting and abdominal pain. Patient states that she been having persistent supraumbilical and infraumbilical abdominal pain. Patient is continued concerned that this may be related to her shunt. Patient has a  shunt and does follow with neurosurgery at the [facility]..  Recently had an LP which showed elevated CSF protein and she was transferred to [facility] where neurosurgery was consulted and the patient was discharged. She reports that her nausea and vomiting is typically improved with Phenergan which she is currently out of. She reports low-grade fevers which have been ongoing for the past 3 months. She states she has intermittent headaches but currently denies headache. Denies changes in vision weakness or numbness. Patient reports today that she is here because of persistent nausea vomiting and worsening abdominal pain. Of note the patient has had approximately 13 ED visits at multiple facilities over the last month including admission to the hospital.   over the past 6 months she has had 17 providers prescribe her controlled substances. She has had multiple CTs and imaging recently.          PHYSICAL EXAM  ED Triage Vitals [10/26/21 0625]   BP Temp Temp Source Pulse Resp SpO2 Height Weight   (!) 138/93 97.9 °F (36.6 °C) Oral 99 17 97 % -- 155 lb 6.8 oz (70.5 kg)       Focused Physical exam:   Normocephalic atraumatic  Cranial nerves II through XII are grossly intact   Neck supple and nontender  Soft tissue swelling and erythema around the eyes bilaterally  Moist mucous membranes  Regular rate and rhythm, no murmurs rubs or gallops  Lungs clear to ausculatation bilaterally  Abdomen soft nontender nondistended, no rebound or guarding  +5 strength in all extremities  Sensation intact in all extremities  Skin warm and dry       Plan:EKG, CMP, Lipase, CBC, UA,  antiemetics, analgesics    Triage orders placed.     I did not personally review any of the results of these tests, which will be reviewed and interpreted later by ED providers at the time of the patient's more comprehensive evaluation.           Félix Kim MD  10/26/21 [id number]

## 2021-10-26 NOTE — ED NOTES
Pt instructed to go to the bathroom, urine specimen needed.  PT upset, states \"I don't have to go, I went before coming in\"     Jose Solis RN  10/26/21 [id number]

## 2021-10-26 NOTE — ED PROVIDER NOTES
[address] [facility]  EMERGENCY DEPARTMENTENCOUNTER      Pt Name: Miguel Kruger  MRN: [id number]  [place] 1980  Date ofevaluation: 10/26/2021  Provider: Zurdo Gonzalez MD    CHIEF COMPLAINT       Chief Complaint   Patient presents with    Abdominal Pain     Right side abdominal pain. Chronic. States \"I've been feeling worse since the last time\". Describes pain as sharp and continuous. c/o fevers at home but not today here. emesis x 5 days.  Facial Swelling     swelling around bilateral eyes x 3 months. Zyrtec on medication list but pt says she's not taking it for a long time. HISTORY OF PRESENT ILLNESS   (Location/Symptom, Timing/Onset,Context/Setting, Quality, Duration, Modifying Factors, Severity)  Note limiting factors. Miguel Kruger is a 46 y.o. female  who  has a past medical history of ADHD (attention deficit hyperactivity disorder), Depression with anxiety, Diabetes mellitus ([address] Difficult intubation, Encounter for imaging to screen for metal prior to MRI, ESBL (extended spectrum beta-lactamase) producing bacteria infection, Functional ovarian cysts, Headache(784.0), History of blood transfusion, History of kidney stones, History of PCOS, Hydrocephalus ([address] Hyperlipidemia, Irritable bowel syndrome, Meningitis, Neutrophilic leukocytosis, Nicotine dependence, PONV (postoperative nausea and vomiting), Primary osteoarthritis of left knee, S/P cone biopsy of cervix, Scoliosis,  (ventriculoperitoneal) shunt status,  (ventriculoperitoneal) shunt status, and Wears glasses. 51-year-old female with history as above who presents for chronic abdominal pain and bilateral eye swelling. Patient has been seen in the ER multiple times for similar complaints. I had extended discussion with patient about her chronic complaints.   Patient was recently seen in [address] [place] [address] emergency room had a spinal tap and was transferred to [facility]..  At that time she had elevated protein in her CSF and she was concerned that she might have an infection. She states that as soon as she got to [facility] the hospitalist and the neurosurgeon discharged her and stated that there was nothing wrong. Patient is very anxious and concerned that her symptoms have been going on now for 3 4 5 months and not getting better. Also associated with 3 months of bilateral eye puffiness without vision changes or eye pain. Patient denies headache numbness weakness or other neurologic symptom at this time her primary concern is that she is having intermittent abdominal pain and vomiting which is been going on for as stated at least for 5 months. She has seen multiple doctors and nothing seems to be working. Patient states she has follow-up scheduled with an allergist, rheumatologist, a gastroenterologist but none of them are till next month. She has been given nausea medicine, Xanax, Vicodin at home but states that none of this seems to be helping. Her symptoms are constant and they are worsening. She is very honest and admits that she has a problem with anxiety and as soon as she gets symptoms she tends to get worked up and her symptoms then get worse. Nothing seems to make her symptoms better. She states, \"I just do not want to feel like this anymore\". She is primarily here today stating just seeking some relief. Denies any fever, chest pain, shortness of breath, dysuria, hematuria, back pain. The history is provided by the patient. No  was used. NursingNotes were reviewed. REVIEW OF SYSTEMS    (2-9 systems for level 4, 10 or more for level 5)     Review of Systems   Constitutional: Negative. Negative for fever. HENT: Negative. Negative for sore throat. Eyes: Negative for photophobia, pain, discharge, redness and visual disturbance. Eye swelling   Respiratory: Negative. Negative for shortness of breath. Cardiovascular: Negative.   Negative for chest pain and palpitations. Gastrointestinal: Positive for abdominal pain, diarrhea, nausea and vomiting. Negative for abdominal distention. Genitourinary: Negative. Negative for dysuria and hematuria. Musculoskeletal: Negative. Skin: Negative. Neurological: Negative. Negative for dizziness, seizures, syncope, facial asymmetry, speech difficulty, weakness, light-headedness, numbness and headaches. Hematological: Negative. Does not bruise/bleed easily. Except as noted above the remainder of the review of systems was reviewed and negative. PAST MEDICAL HISTORY     Past Medical History:   Diagnosis Date    ADHD (attention deficit hyperactivity disorder) 82    Depression with anxiety     Diabetes mellitus ([facility]     pre-diabetes    Difficult intubation     Encounter for imaging to screen for metal prior to MRI 06/01/2021    MRI Conditional Medtronic Non-Programmable shunt model#[id number] implanted 10/30/2020 at [facility]. Normal Mode. 1.5T or 3.0T.    ESBL (extended spectrum beta-lactamase) producing bacteria infection 11/06/2019    urine    Functional ovarian cysts 2008    rt ovary cyst x 2 yrs.     Headache(784.0)     migraines    History of blood transfusion     at birth   [facility] History of kidney stones     History of PCOS     Hydrocephalus ([facility]     Hyperlipidemia     Irritable bowel syndrome 2004    had colonoscopy about 6 yrs ago    Meningitis 92/4057    Neutrophilic leukocytosis     Nicotine dependence     PONV (postoperative nausea and vomiting)     very nauseated and sometimes wakes up with a Migraine--happened once after brain surgery    Primary osteoarthritis of left knee 07/01/2016    S/P cone biopsy of cervix 2004    Scoliosis 1990.s     (ventriculoperitoneal) shunt status 1982    hydrocephalus f/w Neurosurgeon at [facility]     (ventriculoperitoneal) shunt status     Wears glasses     reading         SURGICALHISTORY       Past Surgical History: Procedure Laterality Date    ABDOMEN SURGERY N/A 2021    REMOVAL OF ABDOMINAL WALL MASS performed by Bhavna Bernal MD at [facility]      appendicitis     SECTION      placenta previa    CHOLECYSTECTOMY      COLONOSCOPY  2004    COLPOSCOPY      CSF SHUNT      replaced at age 17    CYSTOSCOPY      stone removal    HEMORRHOID SURGERY      HERNIA REPAIR Bilateral     inguinal    HERNIA REPAIR      hiatal hernia    HYSTERECTOMY, TOTAL ABDOMINAL  2016    TAHBSO, adhesions/PCOS    KNEE ARTHROSCOPY Left 2015    medial meniscectomy, chondroplasty, plica resection    LITHOTRIPSY Bilateral 12/10/2019    OTHER SURGICAL HISTORY  10/19/2016    op lap    VENTRICULOPERITONEAL SHUNT      multiple revisions, most recent          CURRENT MEDICATIONS       Previous Medications    ALPRAZOLAM (XANAX) 1 MG TABLET        GABAPENTIN (NEURONTIN) 100 MG CAPSULE        NEOMYCIN-POLYMYXIN-DEXAMETH 3.5-62670-3.1 OINT        SIMVASTATIN (ZOCOR) 10 MG TABLET    Take 1 tablet by mouth nightly    VYVANSE 50 MG CAPSULE                Bee venom, Bentyl [dicyclomine hcl], Dicyclomine, Ketorolac tromethamine, [place], Mushroom extract complex, Oxycodone-acetaminophen, Sulfa antibiotics, Vancomycin, Adhesive tape, Dicyclomine hcl, Haldol [haloperidol], Hydrocodone, Silicone, Sulfacetamide, Acetaminophen, Butalbital-apap-caff-cod, Ceftaroline, Daptomycin, Ketamine, Levaquin [levofloxacin], Methocarbamol, Prochlorperazine, Tazobactam, and Zosyn [piperacillin sod-tazobactam so]    FAMILY HISTORY       Family History   Problem Relation Age of Onset    High Blood Pressure Mother     Diabetes Mother     High Cholesterol Mother     Depression Mother     Diabetes Maternal Grandmother     High Blood Pressure Maternal Grandmother     High Cholesterol Maternal Grandmother     Heart Disease Maternal Grandfather     High Blood Pressure Maternal Grandfather     Heart Disease Paternal Grandmother     Stroke Paternal Grandfather     Depression Sister     Cirrhosis Father     Rheum Arthritis Neg Hx     Osteoarthritis Neg Hx     Asthma Neg Hx     Breast Cancer Neg Hx     Cancer Neg Hx     Heart Failure Neg Hx     Hypertension Neg Hx     Migraines Neg Hx     Ovarian Cancer Neg Hx     Rashes/Skin Problems Neg Hx     Seizures Neg Hx     Thyroid Disease Neg Hx           SOCIAL HISTORY       Social History     Socioeconomic History    Marital status: Single     Spouse name: None    Number of children: None    Years of education: None    Highest education level: None   Occupational History    Occupation: disability, SSI    Tobacco Use    Smoking status: Current Some Day Smoker     Packs/day: 0.50     Years: 18.00     Pack years: 9.00     Types: Cigarettes    Smokeless tobacco: Never Used   Vaping Use    Vaping Use: Never used   Substance and Sexual Activity    Alcohol use: No     Alcohol/week: 0.0 standard drinks    Drug use: Never    Sexual activity: Not Currently     Partners: Male   Other Topics Concern    None   Social History Narrative    None     Social Determinants of Health     Financial Resource Strain: Low Risk     Difficulty of Paying Living Expenses: Not hard at all   Food Insecurity: No Food Insecurity    Worried About Running Out of Food in the Last Year: Never true    [place] of Food in the Last Year: Never true   Transportation Needs:     Lack of Transportation (Medical):      Lack of Transportation (Non-Medical):    Physical Activity:     Days of Exercise per Week:     Minutes of Exercise per Session:    Stress:     Feeling of Stress :    Social Connections:     Frequency of Communication with Friends and Family:     Frequency of Social Gatherings with Friends and Family:     Attends Restorationism Services:     Active Member of Clubs or Organizations:     Attends Club or Organization Meetings:     Marital Status:    Intimate Partner Violence:     Fear of Current or Ex-Partner:     Emotionally Abused:     Physically Abused:     Sexually Abused:        SCREENINGS             PHYSICAL EXAM    (up to 7 for level 4, 8 or more for level 5)     ED Triage Vitals [10/26/21 0625]   BP Temp Temp Source Pulse Resp SpO2 Height Weight   (!) 138/93 97.9 °F (36.6 °C) Oral 99 17 97 % -- 155 lb 6.8 oz (70.5 kg)       Physical Exam  Vitals and nursing note reviewed. Constitutional:       General: She is not in acute distress. Appearance: Normal appearance. She is well-developed and normal weight. She is not ill-appearing, toxic-appearing or diaphoretic. HENT:      Head: Normocephalic and atraumatic. Right Ear: Tympanic membrane and external ear normal.      Left Ear: Tympanic membrane and external ear normal.      Nose: Nose normal.      Mouth/Throat:      Mouth: Mucous membranes are moist.      Pharynx: No pharyngeal swelling. Eyes:      General: No visual field deficit or scleral icterus. Extraocular Movements: Extraocular movements intact. Conjunctiva/sclera: Conjunctivae normal.      Pupils: Pupils are equal, round, and reactive to light. Comments: Mild bilateral upper and lower eyelid swelling   Cardiovascular:      Rate and Rhythm: Normal rate and regular rhythm. Heart sounds: Normal heart sounds. No murmur heard. No friction rub. No gallop. Pulmonary:      Effort: Pulmonary effort is normal. No respiratory distress. Breath sounds: Normal breath sounds. No stridor. No wheezing, rhonchi or rales. Chest:      Chest wall: No tenderness. Abdominal:      General: Abdomen is flat. Bowel sounds are normal. There is no distension. Palpations: Abdomen is soft. There is no shifting dullness, fluid wave, hepatomegaly, splenomegaly, mass or pulsatile mass. Tenderness: There is no abdominal tenderness. There is no guarding or rebound. Negative signs include Blue's sign, Rovsing's sign and McBurney's sign. Skin:     General: Skin is warm and dry. Capillary Refill: Capillary refill takes less than 2 seconds. Neurological:      General: No focal deficit present. Mental Status: She is alert and oriented to person, place, and time. GCS: GCS eye subscore is 4. GCS verbal subscore is 5. GCS motor subscore is 6. Cranial Nerves: Cranial nerves are intact. No cranial nerve deficit, dysarthria or facial asymmetry. Sensory: Sensation is intact. No sensory deficit. Motor: Motor function is intact. No weakness, tremor, atrophy, abnormal muscle tone or pronator drift. Coordination: Coordination is intact.  Coordination normal. Finger-Nose-Finger Test and Heel to Shin Test normal.         RESULTS     EKG: All EKG's are interpreted by the Emergency Department Physician who either signs or Co-signsthis chart in the absence of a cardiologist.    EKG Interpretation    Interpreted by emergency department physician    Rhythm: normal sinus   Rate: normal  Axis: left  Ectopy: none  Conduction: normal  ST Segments: normal  T Waves: normal  Q Waves: none    Clinical Impression: no acute changes    Jennifer Bryant MD      RADIOLOGY:   Non-plain filmimages such as CT, Ultrasound and MRI are read by the radiologist.   Interpretation per the Radiologist below, if available at the time ofthis note:    No orders to display         ED BEDSIDE ULTRASOUND:   Performed by ED Physician - none    LABS:  Labs Reviewed   COMPREHENSIVE METABOLIC PANEL W/ REFLEX TO MG FOR LOW K - Abnormal; Notable for the following components:       Result Value    Potassium reflex Magnesium 3.2 (*)     Glucose 133 (*)     CREATININE <0.5 (*)     Alkaline Phosphatase 152 (*)      (*)     AST 39 (*)     All other components within normal limits    Narrative:     Performed at:  [facility]) [facility]  [address] [facility]   Phone [phone]   CBC WITH AUTO DIFFERENTIAL Narrative:     Performed at:  [facility]  [address] [facility]   Phone [phone]   LIPASE    Narrative:     Performed at:  [address] [facility]  [address] [facility] [facility] [place]   Phone [phone]   URINE RT REFLEX TO CULTURE    Narrative:     Performed at:  [address] [facility]  [address] [facility]   Phone [phone]   MAGNESIUM    Narrative:     Performed at:  [address] [facility]  [address] [facility]   Phone [phone]   HCG, QUANTITATIVE, PREGNANCY    Narrative:     Performed at:  [address] [facility]  [address] [facility]   Phone [phone]       All other labs were within normal range or not returned as of this dictation. EMERGENCY DEPARTMENT COURSE and DIFFERENTIAL DIAGNOSIS/MDM:   Vitals:    Vitals:    10/26/21 0625   BP: (!) 138/93   Pulse: 99   Resp: 17   Temp: 97.9 °F (36.6 °C)   TempSrc: Oral   SpO2: 97%   Weight: 155 lb 6.8 oz (70.5 kg)       Patient was given thefollowing medications:  Medications   HYDROcodone-acetaminophen (NORCO) 5-325 MG per tablet 1 tablet (has no administration in time range)   promethazine (PHENERGAN) injection 12.5 mg (12.5 mg IntraVENous Given 10/26/21 0803)   lactated ringers bolus (1,000 mLs IntraVENous New Bag 10/26/21 0802)   morphine (PF) injection 2 mg (2 mg IntraVENous Given 10/26/21 2213)       ED COURSE & MEDICAL DECISION MAKING    Pertinent Labs & Imaging studies reviewed. (See chart for details)   -  Patient seen and evaluated in the emergency department. -  Triage and nursing notes reviewed and incorporated. -  Old chart records reviewed and incorporated.   -  Differential diagnosis includes: Differential diagnosis: Abdominal Aortic Aneurysm, Acute Coronary Syndrome, Ischemic Bowel, Bowel Obstruction (including Gastric Outlet Obstruction), PUD, GERD, Acute Cholecystitis, Pancreatitis, Hepatitis, Colitis, SMA Syndrome, Mesenteric Steal Syndrome, Splanchnic Vein Thrombosis, other    42-year-old female who is afebrile not tachycardic saturating well on room air, normotensive with normal neurologic and physical exam.  Patient has normal vision. Patient has no numbness or weakness or focal neurologic findings. No meningeal signs or indication for repeat lumbar puncture at this time. No headache no vision changes. Patient's abdomen is soft nontender not peritonitic. Patient has normal heart and lung sounds. Normal bowel sounds. No musculoskeletal findings. Patient does have mild \"puffiness\" around her eyes without specific signs of urticaria, erythema, cellulitis, erysipelas. I had extended conversation with the patient about her symptoms their length and her general course. Patient has significant amount of anxiety and I think is very upset at the way that she feels she has been blown off by some of the subspecialist that she is seen. She understands that her symptoms are chronic and at this time she admits \"I know my exam and vitals are normal you will probably send me home\". Patient has had multiple imaging studies including shunt series and abdominal CT scans without any specific findings and has no obvious changes in which repeat imaging would be necessary at this time. I did offer patient repeat imaging and she states \"I do want to have any scans anymore\". Patient is primarily here seeking symptom relief and I agree that she likely needs to follow-up with GI, rheumatology, neurosurgery, allergy for her multiple chronic complaints. I will give her symptom relief with her requested medicine of Phenergan as well as some morphine and IV fluids. Patient's EKG relatively unremarkable without prolonged QT.   I will check basic labs including CBC, CMP, lipase, UA to ensure no occult infection or urinary tract infection. If all this is unremarkable I will have further discussion with patient about her chronic symptoms and the importance of follow-up with her subspecialists as well as strict return precautions for any new or worsening symptoms.    -  Work-up included:  See above  -  ED treatment included: See above  -  Results discussed with patient. REASSESSMENT     ED Course as of Oct 26 0930   Tue Oct 26, 2021   3915 Urine does not appear infected. White blood cell count unremarkable. Symptoms slightly improved. Patient requesting further prescription for opioids of which I declined and told her that I think that she has some prescriptions at home when she should continue and complete those until she gets a new prescription for opioids. I did agree to give patient 1 dose of Norco prior to her leaving. Patient's exam is otherwise unchanged. Vital signs remained stable. I encourage patient to follow-up with her previously scheduled referrals with rheumatology, allergy, GI, neurosurgery as well as following up with her primary care provider. I will also give her strict return precautions for any new or worsening symptoms. [SC]      ED Course User Index  [SC] Keshav Terry MD     I completed a structured, evidence-based clinical evaluation and testing for abdominal pain. The patient declined the CT. The evidence indicates that the  patient is very low risk for any acute abdominal emergency, and this is consistent with my clinical intuition. The risk of further  imaging or hospitalization is likely higher than the risk of the patient having an acute emergent condition related to todays  symptoms. It is, therefore, in the patients best interest not to do additional emergent testing or be hospitalized.     I estimate there is LOW risk for SUBARACHNOID HEMORRHAGE, MENINGITIS, INTRACRANIAL HEMORRHAGE, ENCEPHALITIS, TEMPORAL ARTERITIS, PSEUDOTUMOR CEREBIR, ACUTE GLAUCOMA, SUBDURAL OR EPIDURAL HEMATOMA, OR STROKE, thus I consider the discharge disposition reasonable. The patient is at low risk for mortality based on demographic, history and clinical factors. Given the best available information and clinical assessment, I estimate the risk of hospitalization to be greater than risk of treatment at home. I have explained to the patient that the risk could rapidly change, given precautions for return and instructions. Explained to patient that the risk for mortality is low based on demographic, history and clinical factors. I discussed with patient the results of evaluation in the ED, diagnosis, care, and prognosis. The plan is to discharge to home. Patient is in agreement with plan and questions have been answered. I also discussed with patient the reasons which may require a return visit and the importance of follow-up care. The patient is well-appearing, nontoxic, and improved at the time of discharge. Patient agrees to call to arrange follow-up care as directed. Patient understands to return immediately for worsening/change in symptoms. CRITICAL CARE TIME   Total Critical Care time was 15 minutes, excluding separately reportable procedures. There was a high probability of clinically significant/life threatening deterioration in the patient's condition which required my urgent intervention. This includes multiple reevaluations, vital sign monitoring, pulse oximetry monitoring, telemetry monitoring, clinical response to the IV medications, reviewing the nursing notes, consultation time, dictation/documentation time, and interpretation of the labwork. (This time excludes time spent performing procedures). CONSULTS:  None    PROCEDURES:  Unless otherwise noted below, none     Procedures    FINAL IMPRESSION      1. Chronic upper abdominal pain    2.  Eye swelling, bilateral          DISPOSITION/PLAN   DISPOSITION        PATIENT REFERREDTO:  Lupis CRAWFORD Gladis [facility] [address]  [address] [address] [facility]    Schedule an appointment as soon as possible for a visit         DISCHARGEMEDICATIONS:  New Prescriptions    No medications on file          (Please note that portions of this note were completed with a voice recognition program.  Efforts were made to edit the dictations but occasionally words are mis-transcribed.)    Lukasz Ortiz MD (electronically signed)  Attending Emergency Physician          Lukasz Ortiz MD  10/26/21 8197

## 2021-10-27 ENCOUNTER — HOSPITAL ENCOUNTER (EMERGENCY)
Age: 41
Discharge: HOME | End: 2021-10-27
Attending: EMERGENCY MEDICINE
Payer: COMMERCIAL

## 2021-10-27 ENCOUNTER — HOSPITAL ENCOUNTER (EMERGENCY)
Age: 41
Discharge: LEFT BEFORE DISCHARGE | End: 2021-10-27
Attending: EMERGENCY MEDICINE
Payer: COMMERCIAL

## 2021-10-27 VITALS
WEIGHT: 156.97 LBS | OXYGEN SATURATION: 100 % | BODY MASS INDEX: 31.7 KG/M2 | TEMPERATURE: 98.7 F | DIASTOLIC BLOOD PRESSURE: 82 MMHG | HEART RATE: 100 BPM | RESPIRATION RATE: 24 BRPM | SYSTOLIC BLOOD PRESSURE: 134 MMHG

## 2021-10-27 VITALS
WEIGHT: 154.1 LBS | HEART RATE: 97 BPM | TEMPERATURE: 98.8 F | SYSTOLIC BLOOD PRESSURE: 102 MMHG | HEIGHT: 59 IN | DIASTOLIC BLOOD PRESSURE: 52 MMHG | BODY MASS INDEX: 31.07 KG/M2 | OXYGEN SATURATION: 97 % | RESPIRATION RATE: 18 BRPM

## 2021-10-27 DIAGNOSIS — Z98.2 VP (VENTRICULOPERITONEAL) SHUNT STATUS: ICD-10-CM

## 2021-10-27 DIAGNOSIS — Z53.29 LEFT AGAINST MEDICAL ADVICE: Primary | ICD-10-CM

## 2021-10-27 DIAGNOSIS — R11.2 NON-INTRACTABLE VOMITING WITH NAUSEA, UNSPECIFIED VOMITING TYPE: Primary | ICD-10-CM

## 2021-10-27 LAB
ALBUMIN SERPL-MCNC: 4.6 G/DL (ref 3.4–5)
ALP BLD-CCNC: 146 U/L (ref 40–129)
ALT SERPL-CCNC: 61 U/L (ref 10–40)
ANION GAP SERPL CALCULATED.3IONS-SCNC: 14 MMOL/L (ref 3–16)
AST SERPL-CCNC: 12 U/L (ref 15–37)
BILIRUB SERPL-MCNC: 0.4 MG/DL (ref 0–1)
BILIRUBIN DIRECT: <0.2 MG/DL (ref 0–0.3)
BILIRUBIN, INDIRECT: ABNORMAL MG/DL (ref 0–1)
BUN BLDV-MCNC: 8 MG/DL (ref 7–20)
CALCIUM SERPL-MCNC: 9.9 MG/DL (ref 8.3–10.6)
CHLORIDE BLD-SCNC: 102 MMOL/L (ref 99–110)
CO2: 22 MMOL/L (ref 21–32)
CREAT SERPL-MCNC: 0.5 MG/DL (ref 0.6–1.1)
GFR AFRICAN AMERICAN: >60
GFR NON-AFRICAN AMERICAN: >60
GLUCOSE BLD-MCNC: 141 MG/DL (ref 70–99)
HCT VFR BLD CALC: 43.1 % (ref 36–48)
HEMOGLOBIN: 14.8 G/DL (ref 12–16)
LIPASE: 11 U/L (ref 13–60)
MAGNESIUM: 2.2 MG/DL (ref 1.8–2.4)
MCH RBC QN AUTO: 29.6 PG (ref 26–34)
MCHC RBC AUTO-ENTMCNC: 34.4 G/DL (ref 31–36)
MCV RBC AUTO: 86.2 FL (ref 80–100)
PDW BLD-RTO: 14.4 % (ref 12.4–15.4)
PLATELET # BLD: 306 K/UL (ref 135–450)
PMV BLD AUTO: 7.8 FL (ref 5–10.5)
POTASSIUM REFLEX MAGNESIUM: 3.5 MMOL/L (ref 3.5–5.1)
RBC # BLD: 5 M/UL (ref 4–5.2)
SODIUM BLD-SCNC: 138 MMOL/L (ref 136–145)
TOTAL PROTEIN: 7.9 G/DL (ref 6.4–8.2)
WBC # BLD: 17.7 K/UL (ref 4–11)

## 2021-10-27 PROCEDURE — 80048 BASIC METABOLIC PNL TOTAL CA: CPT

## 2021-10-27 PROCEDURE — 36415 COLL VENOUS BLD VENIPUNCTURE: CPT

## 2021-10-27 PROCEDURE — 81003 URINALYSIS AUTO W/O SCOPE: CPT

## 2021-10-27 PROCEDURE — 6370000000 HC RX 637 (ALT 250 FOR IP): Performed by: EMERGENCY MEDICINE

## 2021-10-27 PROCEDURE — 80076 HEPATIC FUNCTION PANEL: CPT

## 2021-10-27 PROCEDURE — 99283 EMERGENCY DEPT VISIT LOW MDM: CPT

## 2021-10-27 PROCEDURE — 83735 ASSAY OF MAGNESIUM: CPT

## 2021-10-27 PROCEDURE — 83690 ASSAY OF LIPASE: CPT

## 2021-10-27 PROCEDURE — 99284 EMERGENCY DEPT VISIT MOD MDM: CPT

## 2021-10-27 PROCEDURE — 85027 COMPLETE CBC AUTOMATED: CPT

## 2021-10-27 RX ORDER — MELOXICAM 7.5 MG/1
7.5 TABLET ORAL ONCE
Status: DISCONTINUED | OUTPATIENT
Start: 2021-10-27 | End: 2021-10-27 | Stop reason: HOSPADM

## 2021-10-27 RX ORDER — CLINDAMYCIN HYDROCHLORIDE 150 MG/1
300 CAPSULE ORAL ONCE
Status: COMPLETED | OUTPATIENT
Start: 2021-10-27 | End: 2021-10-27

## 2021-10-27 RX ORDER — METOCLOPRAMIDE HYDROCHLORIDE 5 MG/ML
10 INJECTION INTRAMUSCULAR; INTRAVENOUS ONCE
Status: DISCONTINUED | OUTPATIENT
Start: 2021-10-27 | End: 2021-10-27

## 2021-10-27 RX ORDER — SUCRALFATE 1 G/1
1 TABLET ORAL 4 TIMES DAILY
Qty: 120 TABLET | Refills: 3 | Status: SHIPPED | OUTPATIENT
Start: 2021-10-27 | End: 2021-10-28 | Stop reason: ALTCHOICE

## 2021-10-27 RX ORDER — PROMETHAZINE HYDROCHLORIDE 25 MG/1
25 SUPPOSITORY RECTAL EVERY 6 HOURS PRN
Qty: 20 SUPPOSITORY | Refills: 0 | Status: SHIPPED | OUTPATIENT
Start: 2021-10-27 | End: 2021-10-28 | Stop reason: ALTCHOICE

## 2021-10-27 RX ORDER — CLINDAMYCIN HYDROCHLORIDE 300 MG/1
300 CAPSULE ORAL 4 TIMES DAILY
Qty: 28 CAPSULE | Refills: 0 | Status: SHIPPED | OUTPATIENT
Start: 2021-10-27 | End: 2021-11-03

## 2021-10-27 RX ORDER — KETOROLAC TROMETHAMINE 10 MG/1
10 TABLET, FILM COATED ORAL ONCE
Status: DISCONTINUED | OUTPATIENT
Start: 2021-10-27 | End: 2021-10-27

## 2021-10-27 RX ORDER — PROMETHAZINE HYDROCHLORIDE 6.25 MG/5ML
12.5 SYRUP ORAL ONCE
Status: DISCONTINUED | OUTPATIENT
Start: 2021-10-27 | End: 2021-10-27 | Stop reason: HOSPADM

## 2021-10-27 RX ORDER — PROMETHAZINE HYDROCHLORIDE 25 MG/1
25 TABLET ORAL EVERY 6 HOURS PRN
COMMUNITY
End: 2021-10-28 | Stop reason: SDUPTHER

## 2021-10-27 RX ORDER — TRAMADOL HYDROCHLORIDE 50 MG/1
50 TABLET ORAL ONCE
Status: COMPLETED | OUTPATIENT
Start: 2021-10-27 | End: 2021-10-27

## 2021-10-27 RX ORDER — DIPHENHYDRAMINE HCL 25 MG
25 TABLET ORAL ONCE
Status: COMPLETED | OUTPATIENT
Start: 2021-10-27 | End: 2021-10-27

## 2021-10-27 RX ADMIN — DIPHENHYDRAMINE HCL 25 MG: 25 TABLET ORAL at 21:58

## 2021-10-27 RX ADMIN — CLINDAMYCIN HYDROCHLORIDE 300 MG: 150 CAPSULE ORAL at 21:58

## 2021-10-27 RX ADMIN — TRAMADOL HYDROCHLORIDE 50 MG: 50 TABLET ORAL at 21:58

## 2021-10-27 ASSESSMENT — PAIN DESCRIPTION - DESCRIPTORS: DESCRIPTORS: SHARP

## 2021-10-27 ASSESSMENT — PAIN DESCRIPTION - PAIN TYPE
TYPE: CHRONIC PAIN
TYPE: ACUTE PAIN

## 2021-10-27 ASSESSMENT — ENCOUNTER SYMPTOMS
VOMITING: 1
PHOTOPHOBIA: 0
COLOR CHANGE: 0
TROUBLE SWALLOWING: 0
NAUSEA: 1
ABDOMINAL PAIN: 1
SHORTNESS OF BREATH: 0
COUGH: 0

## 2021-10-27 ASSESSMENT — PAIN SCALES - GENERAL
PAINLEVEL_OUTOF10: 8

## 2021-10-27 ASSESSMENT — PAIN DESCRIPTION - LOCATION
LOCATION: ABDOMEN;NECK
LOCATION: HEAD;NECK;ABDOMEN

## 2021-10-27 NOTE — ED TRIAGE NOTES
Pt arrives via ems for eval of chronic abdominal pain but sts now she has a fever and emesis as well as red line down neck from shunt. Pt is a/ox4, resp nonlabored and wpd.

## 2021-10-27 NOTE — ED NOTES
Pt is not in room and called ED to say she left her  in room after eloping.       Quiana Perez RN  10/27/21 3401

## 2021-10-27 NOTE — ED PROVIDER NOTES
[address]        Pt Name: Yosef Saucedo  MRN: [id number]  [place] 1980  Date of evaluation: 10/27/2021  Provider: Azul Galindo PA-C  PCP: FREDRICK Holly CNP  Note Started: 7:00 PM EDT       I have seen and evaluated this patient with my supervising physician Dr. Chito Abad   Patient presents with    Neck Pain     pt has been seen multiple times, states that she \" even try calling my neurologist in [place] and no one will answer\"          HISTORY OF PRESENT ILLNESS   (Location/Symptom, Timing/Onset, Context/Setting, Quality, Duration, Modifying Factors, Severity)  Note limiting factors. Chief Complaint: pain an dnausea    Yosef Saucedo is a 46 y.o. female who presents indicating that since being evaluated yesterday at [address] [place] [address] she has had another episode of vomiting. That was in spite of using her Phenergan at home once yesterday. She states that she really did not try any medication today and has not been eating or drinking. She states that she is tired of always feeling sick. She reportedly tried to get a hold of her neurosurgeon by calling them today. Reportedly she did that several times but was unsuccessful. That is why she decided to come to the ER. She states that since yesterday she has felt feverish and took pictures of the thermometer that she used at home showing fever over over 100.4. She also has noted redness around the right neck to upper chest area that seems different than before to the patient. She has a shunt in the left head down the left neck and to the right upper abdomen area and states all these areas are hurting her though her head does not feel like a headache but more like some fullness there. The localizing neck tenderness worse with turning her chin to the right.   Patient states that she has had complications from the shunt before and is concerned about that again. So she came to the ER. Patient states that she has taken nothing so far today to try to help pain or the nausea but would like something here. She acknowledges that she has been seen many times over the course of the last month at various emergency departments for similar issues and she indicates that she never seems to get the care that she would like. Nursing Notes were all reviewed and agreed with or any disagreements were addressed in the HPI. REVIEW OF SYSTEMS    (2-9 systems for level 4, 10 or more for level 5)     Review of Systems  Positive history as above with no known infectious disease exposure. Patient denying any generalized headache but positive to the symptoms as above. No vision change or neck pain or stiffness posteriorly. No lymph node swelling or difficulty swallowing reported to patient. No chest pain or shortness of breath or heart palpitations. No abdominal distention at this time per patient or any acute urine or stool changes or extremity changes. PAST MEDICAL HISTORY     Past Medical History:   Diagnosis Date    ADHD (attention deficit hyperactivity disorder) 82    Depression with anxiety     Diabetes mellitus ([address]     pre-diabetes    Difficult intubation     Encounter for imaging to screen for metal prior to MRI 06/01/2021    MRI Conditional Medtronic Non-Programmable shunt model#[id number] implanted 10/30/2020 at [facility]. Normal Mode. 1.5T or 3.0T.    ESBL (extended spectrum beta-lactamase) producing bacteria infection 11/06/2019    urine    Functional ovarian cysts 2008    rt ovary cyst x 2 yrs.     Headache(784.0)     migraines    History of blood transfusion     at birth    History of kidney stones     History of PCOS     Hydrocephalus ([address]     Hyperlipidemia     Irritable bowel syndrome 2004    had colonoscopy about 6 yrs ago    Meningitis 05/7857    Neutrophilic leukocytosis     Nicotine dependence     PONV (postoperative nausea and vomiting)     very nauseated and sometimes wakes up with a Migraine--happened once after brain surgery    Primary osteoarthritis of left knee 2016    S/P cone biopsy of cervix     Scoliosis .s     (ventriculoperitoneal) shunt status     hydrocephalus f[facility]w [facility]geon [facility]     (ventriculoperitoneal) shunt status     Wears glasses     reading       SURGICAL HISTORY     Past Surgical History:   Procedure Laterality Date    ABDOMEN SURGERY N/A 2021    REMOVAL OF ABDOMINAL WALL MASS performed by Sabrina Juarez MD at [address] [place] [address]      appendicitis     SECTION  2005    placenta previa    CHOLECYSTECTOMY      COLONOSCOPY  2004    COLPOSCOPY      CSF SHUNT      replaced at age 17   Dilcia Coral CYSTOSCOPY      stone removal    HEMORRHOID SURGERY      HERNIA REPAIR Bilateral     inguinal    HERNIA REPAIR      hiatal hernia    HYSTERECTOMY, TOTAL ABDOMINAL  2016    TAHBSO, adhesions/PCOS    KNEE ARTHROSCOPY Left 2015    medial meniscectomy, chondroplasty, plica resection    LITHOTRIPSY Bilateral 12/10/2019    OTHER SURGICAL HISTORY  10/19/2016    op lap    VENTRICULOPERITONEAL SHUNT      multiple revisions, most recent        CURRENTMEDICATIONS       Discharge Medication List as of 10/27/2021  6:52 PM      CONTINUE these medications which have NOT CHANGED    Details   ondansetron (ZOFRAN-ODT) 4 MG disintegrating tablet Take 1 tablet by mouth 3 times daily as needed for Nausea or Vomiting, Disp-12 tablet, R-0Print      ALPRAZolam (XANAX) 1 MG tablet Historical Med      gabapentin (NEURONTIN) 100 MG capsule Historical Med      neomycin-polymyxin-dexameth 3.5-03828-4.1 OINT Starting Mon 2021, Historical Med      VYVANSE 50 MG capsule DAWHistorical Med      simvastatin (ZOCOR) 10 MG tablet Take 1 tablet by mouth nightly, Disp-90 tablet, R-1Normal             ALLERGIES     Bee venom, Bentyl [dicyclomine hcl], Dicyclomine, Ketorolac tromethamine, [place], Mushroom extract complex, Oxycodone-acetaminophen, Sulfa antibiotics, Vancomycin, Adhesive tape, Dicyclomine hcl, Haldol [haloperidol], Hydrocodone, Silicone, Sulfacetamide, Acetaminophen, Butalbital-apap-caff-cod, Ceftaroline, Daptomycin, Ketamine, Levaquin [levofloxacin], Methocarbamol, Prochlorperazine, Tazobactam, and Zosyn [piperacillin sod-tazobactam so]    FAMILYHISTORY       Family History   Problem Relation Age of Onset    High Blood Pressure Mother     Diabetes Mother     High Cholesterol Mother     Depression Mother     Diabetes Maternal Grandmother     High Blood Pressure Maternal Grandmother     High Cholesterol Maternal Grandmother     Heart Disease Maternal Grandfather     High Blood Pressure Maternal Grandfather     Heart Disease Paternal Grandmother     Stroke Paternal Grandfather     Depression Sister     Cirrhosis Father     Rheum Arthritis Neg Hx     Osteoarthritis Neg Hx     Asthma Neg Hx     Breast Cancer Neg Hx     Cancer Neg Hx     Heart Failure Neg Hx     Hypertension Neg Hx     Migraines Neg Hx     Ovarian Cancer Neg Hx     Rashes/Skin Problems Neg Hx     Seizures Neg Hx     Thyroid Disease Neg Hx         SOCIAL HISTORY       Social History     Socioeconomic History    Marital status: Single     Spouse name: None    Number of children: None    Years of education: None    Highest education level: None   Occupational History    Occupation: disability, SSI    Tobacco Use    Smoking status: Current Every Day Smoker     Packs/day: 0.50     Years: 18.00     Pack years: 9.00     Types: Cigarettes    Smokeless tobacco: Never Used   Vaping Use    Vaping Use: Never used   Substance and Sexual Activity    Alcohol use: No     Alcohol/week: 0.0 standard drinks    Drug use: Never    Sexual activity: Not Currently     Partners: Male   Other Topics Concern    None   Social History Narrative    None Social Determinants of Health     Financial Resource Strain: Low Risk     Difficulty of Paying Living Expenses: Not hard at all   Food Insecurity: No Food Insecurity    Worried About Running Out of Food in the Last Year: Never true    [place] of Food in the Last Year: Never true   Transportation Needs:     Lack of Transportation (Medical):  Lack of Transportation (Non-Medical):    Physical Activity:     Days of Exercise per Week:     Minutes of Exercise per Session:    Stress:     Feeling of Stress :    Social Connections:     Frequency of Communication with Friends and Family:     Frequency of Social Gatherings with Friends and Family:     Attends Confucianist Services:     Active Member of Clubs or Organizations:     Attends Club or Organization Meetings:     Marital Status:    Intimate Partner Violence:     Fear of Current or Ex-Partner:     Emotionally Abused:     Physically Abused:     Sexually Abused:        SCREENINGS             PHYSICAL EXAM    (up to 7 for level 4, 8 or more for level 5)     ED Triage Vitals [10/27/21 1707]   BP Temp Temp Source Pulse Resp SpO2 Height Weight   134/82 98.7 °F (37.1 °C) Oral 100 24 100 % -- 156 lb 15.5 oz (71.2 kg)       Physical Exam  Vitals and nursing note reviewed. Constitutional:       Appearance: Normal appearance. She is not ill-appearing or diaphoretic. HENT:      Head: Normocephalic and atraumatic. Right Ear: External ear normal.      Left Ear: External ear normal.      Nose: Nose normal.   Eyes:      General:         Right eye: No discharge. Left eye: No discharge. Conjunctiva/sclera: Conjunctivae normal.   Neck:        Comments: Erythematous areas on the skin as depicted above measuring around 35 cm in length but less densely distributed more in a macular fashion across the upper chest or clavicle areas. Cardiovascular:      Rate and Rhythm: Normal rate and regular rhythm. Pulses: Normal pulses.       Heart sounds: Normal heart sounds. No murmur heard. No gallop. Pulmonary:      Effort: Pulmonary effort is normal. No respiratory distress. Breath sounds: Normal breath sounds. No wheezing, rhonchi or rales. Abdominal:      General: Bowel sounds are normal. There is no distension. Palpations: Abdomen is soft. Tenderness: There is no guarding or rebound. Comments: Mild right upper abdominal discomfort on direct palpation without any rebound or guarding or peritoneal signs. Patient tolerates exam well even talking through palpation showing no significant discomfort. Bowel sounds present appropriate throughout, no masses or pulsatile masses organomegaly based on exam.  No McBurney's point tenderness or Blue sign. Musculoskeletal:         General: No swelling, tenderness, deformity or signs of injury. Normal range of motion. Cervical back: Normal range of motion and neck supple. No spinous process tenderness or muscular tenderness. Right lower leg: No edema. Skin:     General: Skin is warm and dry. Capillary Refill: Capillary refill takes less than 2 seconds. Neurological:      Mental Status: She is alert and oriented to person, place, and time. Mental status is at baseline. Psychiatric:         Mood and Affect: Mood normal.         Behavior: Behavior normal.         DIAGNOSTIC RESULTS   LABS:    Labs Reviewed   URINE RT REFLEX TO CULTURE    Narrative:     Performed at:  [address] [address]   Phone [phone]   CBC   BASIC METABOLIC PANEL W/ REFLEX TO MG FOR LOW K   LIPASE   HEPATIC FUNCTION PANEL       When ordered, only abnormal lab results are displayed. All other labs were within normal range or not returned as of this dictation. EKG:  When ordered, EKG's are interpreted by the Emergency Department Physician in the absence of a cardiologist.  Please see their note for interpretation of EKG.      RADIOLOGY:   Non-plain film images such as CT, Ultrasound and MRI are read by the radiologist. Plain radiographic images are visualized andpreliminarily interpreted by the  ED Provider with the below findings:        Interpretation [facility] Radiologist below, if available at the time of this note:    No orders to display     No results found. PROCEDURES   Unless otherwise noted below, none     Procedures    CRITICAL CARE TIME   N/A    CONSULTS:  None      EMERGENCY DEPARTMENT COURSE and DIFFERENTIAL DIAGNOSIS/MDM:   Vitals:    Vitals:    10/27/21 1707   BP: 134/82   Pulse: 100   Resp: 24   Temp: 98.7 °F (37.1 °C)   TempSrc: Oral   SpO2: 100%   Weight: 156 lb 15.5 oz (71.2 kg)       Patient was given thefollowing medications:  Medications   promethazine (PHENERGAN) 6.25 MG/5ML syrup 12.5 mg (has no administration in time range)   meloxicam (MOBIC) tablet 7.5 mg (has no administration in time range)   This patient presents as above and evaluation and treatment is begun here. Patient is given reassurance right away that we will listen and take her issues very seriously and try to evaluate and see what we may be able to do to help. The area of redness on the left neck to upper chest area is outlined and measured. It is unclear the source of the skin redness initially though it does appear that the upper chest area around the collarbones may have a rash such as is seen with SLE. It is discussed with patient that for her nausea we may start with oral medication to help that out as Phenergan works well for her and she has not tried any Phenergan so far today. Also it is discussed that she needs blood work to help with evaluation and to help determine what needs she may have and she agrees to that plan. Also some p.o. Mobic is added for comfort.   Reportedly when nursing went into the room to give medications among other care patient stated that she was concerned about only getting medication by mouth and indicated that she was considering leaving [address] At that time Dr. Juventino Blake was able to get into the room and discuss potential for care versus her leaving [address] with her. Please see his note for details. Eventually patient did decide to leave [address] without receiving further care or treatment. She was discharged in order to follow-up outpatient and encouraged to return for any emergent worsening or concern. FINAL IMPRESSION      1. Left against medical advice    2.  (ventriculoperitoneal) shunt status          DISPOSITION/PLAN   DISPOSITION [place] - Left Before Treatment Complete 10/27/2021 06:50:30 PM      PATIENT REFERREDTO:  No follow-up provider specified.     DISCHARGE MEDICATIONS:  Discharge Medication List as of 10/27/2021  6:52 PM          DISCONTINUED MEDICATIONS:  Discharge Medication List as of 10/27/2021  6:52 PM                 (Please note that portions ofthis note were completed with a voice recognition program.  Efforts were made to edit the dictations but occasionally words are mis-transcribed.)    Alexia Boateng PA-C (electronically signed)             Alexia Boateng PA-C  10/27/21 1915

## 2021-10-27 NOTE — ED PROVIDER NOTES
and sometimes wakes up with a Migraine--happened once after brain surgery    Primary osteoarthritis of left knee 07/01/2016    S/P cone biopsy of cervix 2004    Scoliosis 1990.s     (ventriculoperitoneal) shunt status 1982    hydrocephalus f/w Neurosurgeon [facility]     (ventriculoperitoneal) shunt status     Wears glasses     reading       MDM:  Randolph Barroso is a 46 y.o. female who presents with  I went to evaluate the patient and she was unhappy with her care. She wanted to be taken to [facility] where they have neurosurgery. However, the squad would not take her to [facility]. They will take her to [facility].  She showed us thermometers that had temperatures of 101.5 and 100.5. However, her temperature was normal here. She has redness over her neck. She has been evaluated for this several times over the past 3 months. She has not felt well. Her exam was normal.  She was standing up and walking around without difficulty. She had no photophobia. Her shunt was tender over the left parietal scalp. I looked through her labs and her shunt series on 10/15/2021 was normal.  She states the hospitalist came in for a few minutes (5 minutes) and told her that she is can be discharged. And not placed on antibiotics have not done else for her at this point in time. She specifically asked for pain medicine when she was evaluated by the physician assistant. Patient was able to ambulate [place] part without difficulty. She did not want to wait to sign AMA form. I instructed her that we did not have time to evaluate her and that she could go home and die and she understands this. She was discharged to follow with her doctor and return if any problems.     Vitals:    10/27/21 1707   BP: 134/82   Pulse: 100   Resp: 24   Temp: 98.7 °F (37.1 °C)   SpO2: 100%       Lab results  Labs Reviewed   URINE RT REFLEX TO CULTURE    Narrative:     Performed at:  [facility]  [address] [address]   Phone [phone]   CBC   BASIC METABOLIC PANEL W/ REFLEX TO MG FOR LOW K   LIPASE   HEPATIC FUNCTION PANEL     Patient would not wait for discharge instructions. Medications   promethazine (PHENERGAN) 6.25 MG/5ML syrup 12.5 mg (has no administration in time range)   meloxicam (MOBIC) tablet 7.5 mg (has no administration in time range)       Discharge Medication List as of 10/27/2021  6:52 PM          The patient's blood pressure was found to be elevated according to CMS/Medicare and the Affordable Care Act/ObamaCare criteria. Elevated blood pressure could occur because of pain or anxiety or other reasons and does not mean that they need to have their blood pressure treated or medications otherwise adjusted. However, this could also be a sign that they will need to have their blood pressure treated or medications changed. The patient was instructed to follow up closely with their personal physician to have their blood pressure rechecked. The patient was instructed to take a list of recent blood pressure readings to their next visit with their personal physician. IMPRESSIONS:  1. Left against medical advice    2.   (ventriculoperitoneal) shunt status               Rita Albarran DO  10/27/21 5862

## 2021-10-27 NOTE — ED NOTES
Pt refuses oral medication at this time. Pt sts she will leave AMA if we are not going to do any additional testing. Kim ROA notified.       Saira Angel RN  10/27/21 5786

## 2021-10-28 ENCOUNTER — TELEPHONE (OUTPATIENT)
Dept: PRIMARY CARE CLINIC | Age: 41
End: 2021-10-28

## 2021-10-28 ENCOUNTER — OFFICE VISIT (OUTPATIENT)
Dept: PRIMARY CARE CLINIC | Age: 41
End: 2021-10-28
Payer: COMMERCIAL

## 2021-10-28 VITALS — BODY MASS INDEX: 31.04 KG/M2 | WEIGHT: 154 LBS | HEIGHT: 59 IN

## 2021-10-28 DIAGNOSIS — G89.29 OTHER CHRONIC PAIN: ICD-10-CM

## 2021-10-28 DIAGNOSIS — G89.29 CHRONIC NONINTRACTABLE HEADACHE, UNSPECIFIED HEADACHE TYPE: ICD-10-CM

## 2021-10-28 DIAGNOSIS — L03.90 CELLULITIS, UNSPECIFIED CELLULITIS SITE: ICD-10-CM

## 2021-10-28 DIAGNOSIS — Z98.2 VP (VENTRICULOPERITONEAL) SHUNT STATUS: ICD-10-CM

## 2021-10-28 DIAGNOSIS — R51.9 CHRONIC NONINTRACTABLE HEADACHE, UNSPECIFIED HEADACHE TYPE: ICD-10-CM

## 2021-10-28 DIAGNOSIS — R11.2 INTRACTABLE VOMITING WITH NAUSEA: Primary | ICD-10-CM

## 2021-10-28 LAB
BILIRUBIN URINE: NEGATIVE
BLOOD, URINE: NEGATIVE
CLARITY: CLEAR
COLOR: YELLOW
GLUCOSE URINE: NEGATIVE MG/DL
KETONES, URINE: NEGATIVE MG/DL
LEUKOCYTE ESTERASE, URINE: NEGATIVE
MICROSCOPIC EXAMINATION: NORMAL
NITRITE, URINE: NEGATIVE
PH UA: 6.5 (ref 5–8)
PROTEIN UA: NEGATIVE MG/DL
SPECIFIC GRAVITY UA: 1.01 (ref 1–1.03)
URINE REFLEX TO CULTURE: NORMAL
URINE TYPE: NORMAL
UROBILINOGEN, URINE: 0.2 E.U./DL

## 2021-10-28 PROCEDURE — G8417 CALC BMI ABV UP PARAM F/U: HCPCS | Performed by: NURSE PRACTITIONER

## 2021-10-28 PROCEDURE — G8484 FLU IMMUNIZE NO ADMIN: HCPCS | Performed by: NURSE PRACTITIONER

## 2021-10-28 PROCEDURE — 99215 OFFICE O/P EST HI 40 MIN: CPT | Performed by: NURSE PRACTITIONER

## 2021-10-28 PROCEDURE — G8427 DOCREV CUR MEDS BY ELIG CLIN: HCPCS | Performed by: NURSE PRACTITIONER

## 2021-10-28 PROCEDURE — 4004F PT TOBACCO SCREEN RCVD TLK: CPT | Performed by: NURSE PRACTITIONER

## 2021-10-28 RX ORDER — TRAMADOL HYDROCHLORIDE 50 MG/1
50 TABLET ORAL EVERY 4 HOURS PRN
Qty: 4 TABLET | Refills: 0 | Status: SHIPPED | OUTPATIENT
Start: 2021-10-28 | End: 2021-10-31

## 2021-10-28 RX ORDER — SCOLOPAMINE TRANSDERMAL SYSTEM 1 MG/1
1 PATCH, EXTENDED RELEASE TRANSDERMAL
Qty: 30 PATCH | Refills: 0 | Status: SHIPPED
Start: 2021-10-28 | End: 2022-02-28 | Stop reason: SINTOL

## 2021-10-28 RX ORDER — NORTRIPTYLINE HYDROCHLORIDE 10 MG/5ML
10 SOLUTION ORAL NIGHTLY
Qty: 473 ML | Refills: 0 | Status: SHIPPED
Start: 2021-10-28 | End: 2021-11-09 | Stop reason: SINTOL

## 2021-10-28 RX ORDER — PROMETHAZINE HYDROCHLORIDE 25 MG/1
25 TABLET ORAL EVERY 6 HOURS PRN
Qty: 90 TABLET | Refills: 0 | Status: SHIPPED | OUTPATIENT
Start: 2021-10-28 | End: 2021-11-09 | Stop reason: ALTCHOICE

## 2021-10-28 RX ORDER — METHYLPREDNISOLONE 4 MG/1
TABLET ORAL
COMMUNITY
Start: 2021-10-23 | End: 2021-10-28 | Stop reason: ALTCHOICE

## 2021-10-28 RX ORDER — TRAMADOL HYDROCHLORIDE 50 MG/1
TABLET ORAL
COMMUNITY
End: 2021-10-28 | Stop reason: ALTCHOICE

## 2021-10-28 ASSESSMENT — ENCOUNTER SYMPTOMS
SHORTNESS OF BREATH: 0
CONSTIPATION: 0
ABDOMINAL PAIN: 1
DIARRHEA: 0
COUGH: 0
COLOR CHANGE: 0
VOMITING: 1
NAUSEA: 1

## 2021-10-28 NOTE — TELEPHONE ENCOUNTER
Pharmacy called making sure that we wanted to fill the promethazine as pt has been getting this filled quite often.  Per ama yes fill 60 tablets 1 every 6 hours

## 2021-10-28 NOTE — ED NOTES
[id number] pt anxious and upset saying she just left [facility] ED. Pt upset with Dr Cecile Leija at [facility]-states \"he was mean\". Pt states today developed redness to left neck/left upper chest-marked with skin marker by [address] ED. Vomited 6 times today. \"pain in my shunt\" and points to left side of neck and left side of head at 8. Post neck pain at 4-5. Right sided abd pain at 6 (\"I'm used to that\"). Pt states she called her [place] neurosurgeon many times today ( Dr. Augusta Fulton), but didn't get any response. All symptoms started about midnight. Pt reports fever 101.6 at 1300 today--took tylenol and temp down to 100.6. Chronic redness and puffiness to bilat eyelids for 3 months. While talking, sometimes says the pain has been going on for awhile and was worse today. Then sometimes says that everything started at midnight today.          Ethelle Kocher, RN  10/27/21 6792

## 2021-10-28 NOTE — ED NOTES
Explained new orders. Pain left neck/head \"from the shunt\" is a 9. Pain post neck at 4-5. Right sided abd pain at 2. Anxious to leave. Concerned that she is going to throw up-offered to ask EMD for nausea med. Pt declines nausea med here . \"I have it at home, I will take it there. \"       Cade Self, RN  10/27/21 4189

## 2021-10-28 NOTE — ED NOTES
EMD updated about pt's chief complaint. Pt states she had blood work and urine testing done at [facility] ED but Dr Socorro Noonan \"wasn't going to do anything, so I left and came here\".         Siobhan Vaughn RN  10/27/21 2045

## 2021-10-28 NOTE — TELEPHONE ENCOUNTER
Pt called 10/27/2021 and spoke with me. Pt was telling me all about her experiences at all the hospitals that she has been too and how she feels she is not being treated properly. Pt started crying on the phone. Stating that she is dizzy, having nausea, a fever and not able to keep anything down. Has a red line down her neck. Stated that she is going to end up dead because of the way hospitals treat people. Asked what ama could do to get her to be kept at a hospital. I expressed to her that we are sorry for how she is being treated and that unfortunately, ama does not have any pull at the hospitals to get anyone admitted. Stated that all we can do is call them and let them know what is going on and that you are coming to the hospital. She asked if I would call [facility] and let them know that she is coming.     I called [facility] and let them know what was going on and that she is coming by squad

## 2021-10-28 NOTE — ED PROVIDER NOTES
[address] [facility]  [facility]      Pt Name: Robin Lebron  MRN: [id number]  [place] 1980  Date ofevaluation: 10/27/2021  Provider: Tru Briscoe MD    CHIEF COMPLAINT     No chief complaint on file. HISTORY OF PRESENT ILLNESS   (Location/Symptom, Timing/Onset,Context/Setting, Quality, Duration, Modifying Factors, Severity)  Note limiting factors. Robin Lebron is a 46 y.o. female  who  has a past medical history of ADHD (attention deficit hyperactivity disorder), Depression with anxiety, Diabetes mellitus ([facility] [address] Difficult intubation, Encounter for imaging to screen for metal prior to MRI, ESBL (extended spectrum beta-lactamase) producing bacteria infection, Functional ovarian cysts, Headache(784.0), History of blood transfusion, History of kidney stones, History of PCOS, Hydrocephalus ([facility] [address] Hyperlipidemia, Irritable bowel syndrome, Meningitis, Neutrophilic leukocytosis, Nicotine dependence, PONV (postoperative nausea and vomiting), Primary osteoarthritis of left knee, S/P cone biopsy of cervix, Scoliosis,  (ventriculoperitoneal) shunt status,  (ventriculoperitoneal) shunt status, and Wears glasses. who presents to the emergency department for evaluation of multiple complaints. Patient was at another hospital shortly prior to arrival to this freestanding emergency department. She had been complaining of redness of the chest and neck area and abdominal pain which is chronic for the patient. Blood work has been obtained and per documentation of the emergency department the patient eloped from the emergency department when she was given oral medications. It is documented that the patient requested to be transferred to [facility]. She presented to this emergency department. Patient is very well-known to this facility has had approximately 16 emergency department visits in the last month. She has a history of  shunt.      Patient informed that she presents today complaining of the redness of her skin for which she is already been evaluated as well as chronic nausea and vomiting. Patient was observed tolerating p.o. while waiting in the waiting room. Patient states that she was treated rudely which prompted her to elope from the emergency department at the other facility. Patient reports that she feels like she is being undertreated. She also reports that she feels she is being labeled a drug seeker. She states that she is tired of feeling ill. She denies headache changes in vision neck pain neck stiffness paresthesias weakness or gait abnormalities. She is complaining about redness around her eyes and across her chest.  Also complaining of persistent nausea and vomiting. She reports she took Phenergan the previous evening but not take any medications for nausea today. States that there are no changes from the nausea or abdominal pain compared to her known chronic history. She reports that she has outpatient appointments with rheumatology gastroenterology and neurosurgery. Patient reports that she has called her neurosurgeon multiple times. Reviewing the patient's medical record there is no indication of a [facility]  Patient has been seen in the inpatient setting by neurosurgery recently. Neurosurgery is also been consulted from the ED multiple times this month. Patient is also had an outpatient visit per the record. Patient reports that she been having fevers up to 101. Previously reports that she been having fevers for the past 3 months. She was noted to be afebrile in this facility and at the previous facility. HPI    NursingNotes were reviewed. REVIEW OF SYSTEMS    (2-9 systems for level 4, 10 or more for level 5)     Review of Systems   Constitutional: Positive for fever. Negative for activity change and fatigue.    HENT: Negative for congestion, mouth sores and trouble swallowing. Eyes: Negative for photophobia and visual disturbance. Respiratory: Negative for cough and shortness of breath. Cardiovascular: Negative for chest pain and palpitations. Gastrointestinal: Positive for abdominal pain, nausea and vomiting. Genitourinary: Negative for difficulty urinating and frequency. Musculoskeletal: Negative for gait problem and neck pain. Skin: Positive for rash. Negative for color change and wound. Neurological: Negative for dizziness, syncope, facial asymmetry, speech difficulty, weakness, light-headedness, numbness and headaches. Psychiatric/Behavioral: Negative for confusion. The patient is not nervous/anxious. All other systems reviewed and are negative. Except as noted above the remainder of the review of systems was reviewed and negative. PAST MEDICAL HISTORY     Past Medical History:   Diagnosis Date    ADHD (attention deficit hyperactivity disorder) 82    Depression with anxiety     Diabetes mellitus ([address]     pre-diabetes    Difficult intubation     Encounter for imaging to screen for metal prior to MRI 06/01/2021    MRI Conditional Medtronic Non-Programmable shunt model#[id number] implanted 10/30/2020 at [facility]. Normal Mode. 1.5T or 3.0T.    ESBL (extended spectrum beta-lactamase) producing bacteria infection 11/06/2019    urine    Functional ovarian cysts 2008    rt ovary cyst x 2 yrs.     Headache(784.0)     migraines    History of blood transfusion     at birth   [employer] History of kidney stones     History of PCOS     Hydrocephalus ([facility] [address]     Hyperlipidemia     Irritable bowel syndrome 2004    had colonoscopy about 6 yrs ago    Meningitis 07/9652    Neutrophilic leukocytosis     Nicotine dependence     PONV (postoperative nausea and vomiting)     very nauseated and sometimes wakes up with a Migraine--happened once after brain surgery    Primary osteoarthritis of left knee 07/01/2016    S/P cone biopsy of cervix 2004    Scoliosis 1990.s     (ventriculoperitoneal) shunt status     hydrocephalus f/w Neurosurgeon at [place] [address]  (ventriculoperitoneal) shunt status     Wears glasses     reading         SURGICALHISTORY       Past Surgical History:   Procedure Laterality Date    ABDOMEN SURGERY N/A 2021    REMOVAL OF ABDOMINAL WALL MASS performed by Florencio Sims MD at [facility]      appendicitis     SECTION  2005    placenta previa    CHOLECYSTECTOMY      COLONOSCOPY  2004    COLPOSCOPY      CSF SHUNT      replaced at age 17   Rodriguez CYSTOSCOPY      stone removal    HEMORRHOID SURGERY      HERNIA REPAIR Bilateral     inguinal    HERNIA REPAIR      hiatal hernia    HYSTERECTOMY, TOTAL ABDOMINAL  2016    TAHBSO, adhesions/PCOS    KNEE ARTHROSCOPY Left 2015    medial meniscectomy, chondroplasty, plica resection    LITHOTRIPSY Bilateral 12/10/2019    OTHER SURGICAL HISTORY  10/19/2016    op lap    VENTRICULOPERITONEAL SHUNT      multiple revisions, most recent          CURRENT MEDICATIONS       Previous Medications    ALPRAZOLAM (XANAX) 1 MG TABLET    Take 1 mg by mouth as needed. Thinks it is every 8 hrs PRN    GABAPENTIN (NEURONTIN) 100 MG CAPSULE        PROMETHAZINE (PHENERGAN) 25 MG TABLET    Take 25 mg by mouth every 6 hours as needed for Nausea    SIMVASTATIN (ZOCOR) 10 MG TABLET    Take 1 tablet by mouth nightly    VYVANSE 50 MG CAPSULE    Take 50 mg by mouth every morning.              Bee venom, Bentyl [dicyclomine hcl], Dicyclomine, Ketorolac tromethamine, [place], Mushroom extract complex, Oxycodone-acetaminophen, Sulfa antibiotics, Vancomycin, Adhesive tape, Dicyclomine hcl, Haldol [haloperidol], Hydrocodone, Silicone, Sulfacetamide, Acetaminophen, Butalbital-apap-caff-cod, Ceftaroline, Daptomycin, Ketamine, Levaquin [levofloxacin], Methocarbamol, Prochlorperazine, Tazobactam, and Zosyn [piperacillin sod-tazobactam so]    FAMILY HISTORY Family History   Problem Relation Age of Onset    High Blood Pressure Mother     Diabetes Mother     High Cholesterol Mother     Depression Mother     Diabetes Maternal Grandmother     High Blood Pressure Maternal Grandmother     High Cholesterol Maternal Grandmother     Heart Disease Maternal Grandfather     High Blood Pressure Maternal Grandfather     Heart Disease Paternal Grandmother     Stroke Paternal Grandfather     Depression Sister     Cirrhosis Father     Rheum Arthritis Neg Hx     Osteoarthritis Neg Hx     Asthma Neg Hx     Breast Cancer Neg Hx     Cancer Neg Hx     Heart Failure Neg Hx     Hypertension Neg Hx     Migraines Neg Hx     Ovarian Cancer Neg Hx     Rashes/Skin Problems Neg Hx     Seizures Neg Hx     Thyroid Disease Neg Hx           SOCIAL HISTORY       Social History     Socioeconomic History    Marital status: Single     Spouse name: None    Number of children: None    Years of education: None    Highest education level: None   Occupational History    Occupation: disability, SSI    Tobacco Use    Smoking status: Current Every Day Smoker     Packs/day: 0.50     Years: 18.00     Pack years: 9.00     Types: Cigarettes    Smokeless tobacco: Never Used   Vaping Use    Vaping Use: Never used   Substance and Sexual Activity    Alcohol use: No     Alcohol/week: 0.0 standard drinks    Drug use: Never    Sexual activity: Not Currently     Partners: Male   Other Topics Concern    None   Social History Narrative    None     Social Determinants of Health     Financial Resource Strain: Low Risk     Difficulty of Paying Living Expenses: Not hard at all   Food Insecurity: No Food Insecurity    Worried About Running Out of Food in the Last Year: Never true    [place] of Food in the Last Year: Never true   Transportation Needs:     Lack of Transportation (Medical):      Lack of Transportation (Non-Medical):    Physical Activity:     Days of Exercise per Week:     Minutes of Exercise per Session:    Stress:     Feeling of Stress :    Social Connections:     Frequency of Communication with Friends and Family:     Frequency of Social Gatherings with Friends and Family:     Attends Protestant Services:     Active Member of Clubs or Organizations:     Attends Club or Organization Meetings:     Marital Status:    Intimate Partner Violence:     Fear of Current or Ex-Partner:     Emotionally Abused:     Physically Abused:     Sexually Abused:        SCREENINGS             PHYSICAL EXAM    (up to 7 for level 4, 8 or more for level 5)     ED Triage Vitals [10/27/21 1948]   BP Temp Temp Source Pulse Resp SpO2 Height Weight   129/89 99.5 °F (37.5 °C) Temporal 130 14 100 % 4' 11\" (1.499 m) 154 lb 1.6 oz (69.9 kg)       Physical Exam  Vitals and nursing note reviewed. Constitutional:       Appearance: She is well-developed. HENT:      Head: Normocephalic and atraumatic. Mouth/Throat:      Mouth: Mucous membranes are moist.      Pharynx: Oropharynx is clear. Eyes:      Extraocular Movements: Extraocular movements intact. Conjunctiva/sclera: Conjunctivae normal.      Pupils: Pupils are equal, round, and reactive to light. Neck:      Trachea: No tracheal deviation. Cardiovascular:      Rate and Rhythm: Regular rhythm. Tachycardia present. Heart sounds: Normal heart sounds. Pulmonary:      Effort: Pulmonary effort is normal.      Breath sounds: Normal breath sounds. Abdominal:      General: There is no distension. Palpations: Abdomen is soft. Tenderness: There is no abdominal tenderness. Musculoskeletal:         General: No swelling or tenderness. Normal range of motion. Cervical back: Normal range of motion. Skin:     General: Skin is warm and dry. Capillary Refill: Capillary refill takes less than 2 seconds. Findings: Erythema present. Neurological:      General: No focal deficit present.       Mental Status: She is alert and oriented to person, place, and time. Mental status is at baseline. Cranial Nerves: No cranial nerve deficit. Sensory: No sensory deficit. Motor: No weakness. Gait: Gait normal.         RESULTS     EKG: All EKG's are interpreted by the Emergency Department Physician who either signs or Co-signsthis chart in the absence of a cardiologist.      RADIOLOGY:   Non-plain filmimages such as CT, Ultrasound and MRI are read by the radiologist. Plain radiographic images are visualized and preliminarily interpreted by the emergency physician with the below findings:      Interpretation per the Radiologist below, if available at the time ofthis note:    No orders to display         ED BEDSIDE ULTRASOUND:   Performed by ED Physician - none    LABS:  Labs Reviewed - No data to display    All other labs were within normal range or not returned as of this dictation. EMERGENCY DEPARTMENT COURSE and DIFFERENTIAL DIAGNOSIS/MDM:   Vitals:    Vitals:    10/27/21 1948 10/27/21 2040   BP: 129/89 125/75   Pulse: 130 116   Resp: 14 22   Temp: 99.5 °F (37.5 °C) 98.6 °F (37 °C)   TempSrc: Temporal Oral   SpO2: 100% 98%   Weight: 154 lb 1.6 oz (69.9 kg)    Height: 4' 11\" (1.499 m)        Patient was given thefollowing medications:  Medications   traMADol (ULTRAM) tablet 50 mg (50 mg Oral Given 10/27/21 2158)   diphenhydrAMINE (BENADRYL) tablet 25 mg (25 mg Oral Given 10/27/21 2158)   clindamycin (CLEOCIN) capsule 300 mg (300 mg Oral Given 10/27/21 2158)       ED COURSE & MEDICAL DECISION MAKING    Pertinent Labs & Imaging studies reviewed. (See chart for details)   -  Patient seen and evaluated in the emergency department. -  Triage and nursing notes reviewed and incorporated. -  Old chart records reviewed and incorporated.   -  Differential diagnosis includes: Differential diagnosis: Necrotizing fasciitis, cellulitis, erysipelas, suppurative thrombophlebitis, aseptic superficial thrombophlebitis, DVT, gout, compartment syndrome, erythema migrans (lyme disease), contact dermatitis, lymphedema, other    -  Work-up included:  See above  -  ED treatment included: See above  -  Patient presents the ED with a chief complaint of chronic nausea and vomiting as well as redness over the chest and neck. She was seen and evaluated at a additional facility and based on the documentation eloped when she was not given IV medications for her pain. Patient reports that she is unable to tolerate p.o. but is observed tolerating p.o. while waiting to be roomed. She has had multiple ED visits with similar complaints and goes to multiple facilities frequently. It is documented that patient requested to be transferred to [place] [id number] from the previous facility but then presented here. I had a long discussion with the patient that if she think she is having shunt issues she needs to go to a facility where either her neurosurgeon goes or if that she wants to have a 2nd opinion to a facility where they have neurosurgery on-call. Discussed the patient that she left from a facility with limited resources to a facility with even more limited resources. After a long discussion with the patient she ultimately states that her goals are to have her symptoms better controlled. Throughout her discussion the patient expressed frustration that she may be labeled a drug seeker. Discussed with the patient that sh has been going to multiple different facilities, and is currently refusing to try in medications that are narcotics. Patient reports that she is tried these medications previously and knows that they will not work. After this long discussion the patient requested tramadol. Patient's complaints today are similar to too many previous complaints she has had. She is complaining of fevers but states that these have been present for multiple months.   I had seen the patient a few days previously and she had the same complaints. She denies any focal neurological deficits. Denies headaches. There are no signs of meningismus. Her neuro exam is benign. She recently had a lumbar puncture and was evaluated by neurosurgery per medical record when she presented with similar complaints a few weeks previously. She had multiple imaging studies including a shunt series and CT of the abdomen which have not shown any emergent abnormalities. I have very low suspicion for shunt malfunction or intracranial infection based on patient's presentation and history. I asked the patient why after full work-up she eloped from another emergency department after being presented with oral medications, per ED documentation. States that she knew the medications that she was going to be given would not work. She also states that \"because she gets treated better here\". Patient acknowledges that this is a low resource setting, and acknowledges all the hospitals where her neurosurgeon has privileges. This is concerning the patient is avoiding places where she can get definitive care or appropriate consultation and is only taking symptomatic treatment. Socially after the patient was able to present to this facility via private means after discharge from [facility].     Per patient her goals today are to have her symptoms better controlled. I did acquiesce and give the patient 1 oral tramadol in the emergency department which she tolerated well, patient was noted to be tachycardic but was obviously emotionally distraught on arrival to the ED. able to tolerate p.o. medications without the assistance of antiemetics. heart rate did improve after medication. Patient did request to be transferred to [facility]. Patient was informed that she has no neurological deficits or concerns for  shunt malfunction. I do not think that this would be an appropriate consultation. Patient remained hemodynamically stable.   She was provided with additional analgesics and medications for chronic symptoms. I did write for clindamycin in case she was developing cellulitis otherwise suspect this is likely possibly inflammatory or allergic reaction. Patient does have chronic periorbital erythema and swelling but refuses to take steroids or antihistamines. Patient discharged in stable condition tolerating p.o. and ambulating without difficulties. Vital signs improved and she is neurovascular intact. I have low suspicion for life-threatening etiology as the patient complaints today. Be chronic in nature and she has had multiple similar presentations at multiple other facilities over the past few months. REASSESSMENT          CRITICAL CARE TIME   Total Critical Care time was 0 minutes, excluding separately reportable procedures. There was a high probability of clinically significant/life threatening deterioration in the patient's condition which required my urgent intervention. CONSULTS:  None    PROCEDURES:  Unless otherwise noted below, none     Procedures    FINAL IMPRESSION      1. Non-intractable vomiting with nausea, unspecified vomiting type          DISPOSITION/PLAN   DISPOSITION Discharge - Pending Orders Complete 10/27/2021 10:03:31 PM      PATIENT REFERREDTO:  Renetta Saleem, [address]  [address] [facility] [address] [facility]    Schedule an appointment as soon as possible for a visit   As needed      DISCHARGEMEDICATIONS:  New Prescriptions    CLINDAMYCIN (CLEOCIN) 300 MG CAPSULE    Take 1 capsule by mouth 4 times daily for 7 days    PROMETHAZINE (PHENERGAN) 25 MG SUPPOSITORY    Place 1 suppository rectally every 6 hours as needed for Nausea WARNING:  May cause drowsiness. May impair ability to operate vehicles or machinery. Do not use in combination with alcohol.     SUCRALFATE (CARAFATE) 1 GM TABLET    Take 1 tablet by mouth 4 times daily          (Please note that portions of this note were completed with a voice recognition program.  Efforts were made to edit the dictations but occasionally words are mis-transcribed.)    Rajesh Velasquez MD (electronically signed)  Attending Emergency Physician          Rajesh Velasquez MD  10/27/21 3390

## 2021-10-28 NOTE — PROGRESS NOTES
Pavel Colon (:  1980) is a 46 y.o. female,Established patient, here for evaluation of the following chief complaint(s): Abdominal Pain (PT HAS BEEN HAVING CHRONIC ABDOMINAL PAIN FOR OVER A MONTH, HAS BEEN TO THE ER MULTIPLE TIMES AND NO ONE WILL HELP HER SHE STATED, BEEN HAVING HA, FEVER, NEASUEA AND VOMITING )    Redness on left side of neck since yesterday. They marked it in ER. She states it is causing lots of pain up her neck and into her head as well. Still having fevers, as high as 102. Usually a temporal scanner, but does use oral and gets the same results. Vomiting since last night from being ER after getting two doses of oral antibiotics. States she has not been able to keep anything down. LOV with neurosurgeon- a couple of weeks ago. She has called a couple of times yesterday with no luck of getting a hold of someone. She feels like something is wrong with her shunt. Pt is very upset with how she has been treated in the ERs and that no one is listening to her. Discussed possibility of cyclic vomiting and treatment options. States she has tried cymbalta and topamax and neither helped. Also was on fiorect for headaches/migraines. States she does not have migraines its just headaches from her shunt pain. Has been to pain management but did not like the one she went to because all they did was give her percocet and she does not want that. GI, rheum and allergist appointments in November. That was the earliest she could get in. ASSESSMENT/PLAN:  1. Intractable vomiting with nausea  -     nortriptyline (PAMELOR) 10 MG/5ML solution; Take 5 mLs by mouth nightly, Disp-473 mL, R-0Normal  -     scopolamine (TRANSDERM-SCOP) transdermal patch; Place 1 patch onto the skin every 72 hours, Disp-30 patch, R-0Normal  -     promethazine (PHENERGAN) 25 MG tablet; Take 1 tablet by mouth every 6 hours as needed for Nausea, Disp-90 tablet, R-0Normal  2.  Other chronic pain  -     nortriptyline (PAMELOR) 10 MG/5ML solution; Take 5 mLs by mouth nightly, Disp-473 mL, R-0Normal  -     traMADol (ULTRAM) 50 MG tablet; Take 1 tablet by mouth every 4 hours as needed for Pain for up to 3 days. Intended supply: 3 days. Take lowest dose possible to manage pain, Disp-4 tablet, R-0Normal  -     Dina Doherty MD, Pain Management, [facility]  3.  (ventriculoperitoneal) shunt status  -     traMADol (ULTRAM) 50 MG tablet; Take 1 tablet by mouth every 4 hours as needed for Pain for up to 3 days. Intended supply: 3 days. Take lowest dose possible to manage pain, Disp-4 tablet, SOCORRO-0Amelia Doherty MD, Pain Management, [facility]  4. Cellulitis, unspecified cellulitis site  5. Chronic nonintractable headache, unspecified headache type  -     Alyson Devi MD, Pain Management, [facility]      Return in about 2 months (around 12/28/2021) for Med Check.       -Discussed at length with pt regarding treatment options  -Will trial scopolamine patch and phenergan to help with N/V and be able to keep oral abx down  -Start nortriptyline at low dose to help with all over her chronic pain, anxiety, and vomiting/migraines  -Discussed importance of getting in with neurosurgery and GI. SUBJECTIVE/OBJECTIVE:    Review of Systems   Constitutional: Positive for fever. Negative for chills, diaphoresis and fatigue. Respiratory: Negative for cough and shortness of breath. Cardiovascular: Negative for chest pain. Gastrointestinal: Positive for abdominal pain, nausea and vomiting. Negative for constipation and diarrhea. Skin: Positive for rash. Negative for color change, pallor and wound. Neurological: Positive for headaches. Negative for dizziness, weakness and numbness.        Patient-Reported Vitals 7/6/2020   Patient-Reported Weight 150 pouds   Patient-Reported Height 411   Patient-Reported Temperature 100.1        Physical Exam  Neck:        Comments: Marked areas, redness mostly within the lines         [INSTRUCTIONS:  \"[x]\" Indicates a positive item  \"[]\" Indicates a negative item  -- DELETE ALL ITEMS NOT EXAMINED]    Constitutional: [x] Appears well-developed and well-nourished [x] No apparent distress      [] Abnormal -     Mental status: [x] Alert and awake  [x] Oriented to person/place/time [x] Able to follow commands    [] Abnormal -     Eyes:   EOM    [x]  Normal    [] Abnormal -   Sclera  [x]  Normal    [] Abnormal -          Discharge [x]  None visible   [] Abnormal -     HENT: [x] Normocephalic, atraumatic  [] Abnormal -   [x] Mouth/Throat: Mucous membranes are moist    External Ears [x] Normal  [] Abnormal -    Neck: [x] No visualized mass [] Abnormal -     Pulmonary/Chest: [x] Respiratory effort normal   [x] No visualized signs of difficulty breathing or respiratory distress        [] Abnormal -      Musculoskeletal:   [x] Normal gait with no signs of ataxia         [x] Normal range of motion of neck        [] Abnormal -     Neurological:        [x] No Facial Asymmetry (Cranial nerve 7 motor function) (limited exam due to video visit)          [x] No gaze palsy        [] Abnormal -          Skin:        [x] No significant exanthematous lesions or discoloration noted on facial skin         [] Abnormal -            Psychiatric:       [x] Normal Affect [] Abnormal -        [x] No Hallucinations    Other pertinent observable physical exam findings:-          On this date 10/28/2021 I have spent 45 minutes reviewing previous notes, test results and face to face (virtual) with the patient discussing the diagnosis and importance of compliance with the treatment plan as well as documenting on the day of the visitRommel Ruano was evaluated through a synchronous (real-time) audio-video encounter. The patient (or guardian if applicable) is aware that this is a billable service. Verbal consent to proceed has been obtained within the past 12 months.  The visit was conducted pursuant to the emergency declaration under the [facility], [address] [place] [address] [facility] authority and the [employer] and [employer] General Act. Patient identification was verified, and a caregiver was present when appropriate. The patient was located in a state where the provider was credentialed to provide care. An electronic signature was used to authenticate this note.     --Nicolette Billing, APRN - CNP

## 2021-10-28 NOTE — ED NOTES
Pt wants us to call [facility] to have a new neurologist consulted there because she doesn't like the one she has and she doesn't like [facility].   Explained to pt that because she doesn't have a neurologist/neurosurgeon there, we can't transfer her there without an accepting physician. Explained that she has a [facility] neurosurgeon already to follow up with. Questions answered.      Ly Gilbert RN  10/27/21 6011

## 2021-10-29 NOTE — ED NOTES
Discharge instructions with pt. Explained rx's. Encouraged follow up with her PCP and with her neurosurgeon   (and to return for any further concerns). Gait steady. Anxious to leave saying her ride is here. Pain to left neck/head at 7 and right abd at 2 and post neck at 3.      Para SHANA Harry  10/28/21 5610

## 2021-11-02 ENCOUNTER — TELEPHONE (OUTPATIENT)
Dept: PRIMARY CARE CLINIC | Age: 41
End: 2021-11-02

## 2021-11-02 NOTE — TELEPHONE ENCOUNTER
GENE A  FROM [facility] CALLED. WANTING TO LET FERNANDO KNOW THAT PT WAS ADMITTED TO [facility] ON 10/28/2021 FOR A RASH AND OTHER NON SPECIFIC SKIN RELATED ISSUES. STATED THAT SHE IS ALLERGIC TO HER  SHUNT.      [address] [facility]

## 2021-11-08 ENCOUNTER — TELEPHONE (OUTPATIENT)
Dept: PRIMARY CARE CLINIC | Age: 41
End: 2021-11-08

## 2021-11-08 NOTE — TELEPHONE ENCOUNTER
Pt called on call provider stating she is unable to get a hold of her neurosurgeon that she was told while she was admitted at [facility] that she could be possibly allergic to her shunt, and has a allergy appointment on Monday, has developed a rash on her neck and was unsure of what to do. Patient denied fever, swelling, difficulty breathing, she was unsure if she should try to go to the hospital or keep trying to reach out to her surgeon. Discussed red flags need for emergent/ED care.   Patient will monitor s/s and consider going to the emergency room and/or follow-up with allergist on Monday and will continue to reach out to neurologist.

## 2021-11-09 ENCOUNTER — OFFICE VISIT (OUTPATIENT)
Dept: PRIMARY CARE CLINIC | Age: 41
End: 2021-11-09
Payer: COMMERCIAL

## 2021-11-09 VITALS
HEART RATE: 66 BPM | BODY MASS INDEX: 31.65 KG/M2 | WEIGHT: 157 LBS | OXYGEN SATURATION: 97 % | SYSTOLIC BLOOD PRESSURE: 108 MMHG | DIASTOLIC BLOOD PRESSURE: 64 MMHG | HEIGHT: 59 IN

## 2021-11-09 DIAGNOSIS — R11.2 INTRACTABLE VOMITING WITH NAUSEA: ICD-10-CM

## 2021-11-09 DIAGNOSIS — F90.9 ATTENTION DEFICIT HYPERACTIVITY DISORDER (ADHD), UNSPECIFIED ADHD TYPE: Primary | ICD-10-CM

## 2021-11-09 PROBLEM — E86.9 VOLUME DEPLETION: Status: RESOLVED | Noted: 2020-02-19 | Resolved: 2021-11-09

## 2021-11-09 PROBLEM — R51.9 CHRONIC NONINTRACTABLE HEADACHE: Chronic | Status: RESOLVED | Noted: 2018-04-23 | Resolved: 2021-11-09

## 2021-11-09 PROBLEM — G89.29 CHRONIC NONINTRACTABLE HEADACHE: Chronic | Status: RESOLVED | Noted: 2018-04-23 | Resolved: 2021-11-09

## 2021-11-09 PROBLEM — B02.9 HERPES ZOSTER: Status: RESOLVED | Noted: 2021-06-02 | Resolved: 2021-11-09

## 2021-11-09 PROBLEM — F39 MOOD DISORDER (HCC): Status: ACTIVE | Noted: 2021-10-29

## 2021-11-09 PROBLEM — R51.9 HEADACHE: Status: RESOLVED | Noted: 2021-05-31 | Resolved: 2021-11-09

## 2021-11-09 PROBLEM — G43.119 INTRACTABLE MIGRAINE WITH AURA WITHOUT STATUS MIGRAINOSUS: Status: RESOLVED | Noted: 2021-06-02 | Resolved: 2021-11-09

## 2021-11-09 PROCEDURE — 4004F PT TOBACCO SCREEN RCVD TLK: CPT | Performed by: NURSE PRACTITIONER

## 2021-11-09 PROCEDURE — G8417 CALC BMI ABV UP PARAM F/U: HCPCS | Performed by: NURSE PRACTITIONER

## 2021-11-09 PROCEDURE — G8484 FLU IMMUNIZE NO ADMIN: HCPCS | Performed by: NURSE PRACTITIONER

## 2021-11-09 PROCEDURE — 99214 OFFICE O/P EST MOD 30 MIN: CPT | Performed by: NURSE PRACTITIONER

## 2021-11-09 PROCEDURE — G8427 DOCREV CUR MEDS BY ELIG CLIN: HCPCS | Performed by: NURSE PRACTITIONER

## 2021-11-09 RX ORDER — TRAMADOL HYDROCHLORIDE 50 MG/1
50 TABLET ORAL EVERY 6 HOURS PRN
COMMUNITY
End: 2022-02-28

## 2021-11-09 ASSESSMENT — ENCOUNTER SYMPTOMS
ABDOMINAL PAIN: 0
COUGH: 0
NAUSEA: 0
SHORTNESS OF BREATH: 0
VOMITING: 0

## 2021-11-09 NOTE — PATIENT INSTRUCTIONS
[facility]  [address]  [facility]  Open 7 am - 5 pm Mon-Fri, and Sat 8 am - 12  [address] [facility]   [address]  Open 7 am-5 pm Mon.-Fri. Patient Education        COVID-19 (coronavirus 2019) vaccine, Moderna  Pronunciation:  YANA clements (yana fierro vye katia) VAX een  Brand: Moderna COVID-19 Vaccine PF  What is the most important information I should know about this vaccine? The FDA has authorized emergency use of this vaccine as it may help prevent infection with COVID-19. This vaccine has not been approved to prevent or treat coronavirus or COVID-19. Becoming infected with COVID-19 is much more dangerous to your health than receiving this vaccine. If you have a certain type of reaction after the first dose of this vaccine, your healthcare provider will determine if you can safely receive the second dose. What is the COVID-19 vaccine? COVID-19 is a serious disease caused by a coronavirus called SARS-CoV-2 (Serious Acute Respiratory Syndrome Coronavirus 2). COVID-19 is spread from person to person through the air. COVID-19 can affect your lungs or other organs. Symptoms may be mild or serious and include fever, chills, cough, sore throat, shortness of breath, tiredness, body aches, headache, loss of taste or smell, runny or stuffy nose, nausea, vomiting, or diarrhea. The [employer] and Drug Administration (FDA) has authorized emergency use of COVID-19 vaccine to help prevent infection with SARS-CoV-2. The Moderna vaccine is for use only in adults. This vaccine may help your body develop immunity to SARS-CoV-2. However, this vaccine has not been approved to prevent or treat coronavirus or COVID-19. COVID-19 vaccine is experimental and all of its risks are not yet known. The COVID-19 vaccine does not contain coronavirus and cannot give you COVID-19.   Like any vaccine, COVID-19 vaccine may not provide protection in every person. What should I discuss with my healthcare provider before receiving this vaccine? You should not receive this vaccine if you've ever had an allergic reaction to a COVID-19 vaccine. Your healthcare provider will determine whether any reaction you have would prevent you from safely getting the second dose. If you are infected with COVID-19, are waiting for testing results, or are exposed to someone infected with COVID-19: You may not be able to receive this vaccine until you have no symptoms and/or your required quarantine period has ended. Receiving this vaccine will not make you less contagious to other people if you are infected with COVID-19 but you have no symptoms. If you had COVID-19 and were treated with monoclonal antibodies or convalescent plasma: You should wait 90 days before getting a COVID-19 vaccine. Ask your doctor if you are unsure about any COVID-19 treatments you received. Tell the person giving you this vaccine if you have a fever, or if:  · you've received any treatment or medication for COVID-19 infection in the past 90 days;  · you've received or will receive any other vaccine within 14 days before or after your COVID-19 vaccine;  · you have a weak immune system caused by disease or by using certain medicine (this vaccine may not be as effective if you are immunosuppressed);  · you have bleeding problems;  · you use a blood thinner (such as warfarin, Coumadin, or Deshawn Manuel); or  · you are pregnant or breastfeeding. COVID-19 is more likely to cause serious illness or death in a pregnant woman. Not all risks are known yet, but this vaccine is likely to be less harmful than becoming infected with COVID-19 during pregnancy. How is this vaccine given? Read all vaccine information sheets provided to you. COVID-19 vaccine is given as an injection (shot) into a muscle. The Moderna vaccine is given in a series of 2 shots given 1 month apart.  Your first and second shot should both be the Moderna type of COVID-19 vaccine. The Moderna COVID-19 vaccine is transported and stored at very cold temperatures. However, this vaccine will be at room temperature when it is injected into your arm. You will receive a reminder card showing the date and type of your first injection. Take this card with you when you get your second shot. You will be \"fully vaccinated\" if it has been at least 2 weeks since you received your second dose of this vaccine. You may become infected with COVID-19 if the vaccine has not had enough time to provide protection. Even after you are fully vaccinated, keep using infection control methods when you are in public or around others who may not have been vaccinated. This includes social distancing, hand-washing, using protective face covering, disinfecting surfaces you touch a lot, and not sharing personal items with others. Receiving a COVID-19 vaccine will not cause you to test positive on a coronavirus test. However, once your body develops immunity to COVID-19, you could test positive on an antibody test (a test to detect immunity in your body from previous exposure to coronavirus). It is not known how long this vaccine will protect you from infection with COVID-19. It also is not known how long immunity will last in a person who's been infected with and recovered from COVID-19. COVID-19 vaccine is still being studied and all of its risks are not yet known. Updated federal public health recommendations may be found at Gradalisory.DGSE.br  What happens if I miss a dose? Be sure to receive all recommended doses of COVID-19 vaccine or you may not be fully protected. Contact your vaccination provider or health department if you miss your second dose. What happens if I overdose? An overdose of this vaccine is unlikely to occur. What should I avoid after receiving this vaccine?   Avoid receiving other vaccines without first seeking medical advice. What are the possible side effects of this vaccine? Get emergency medical help if you have signs of an allergic reaction: hives, itching; confusion, dizziness, fainting; vomiting, diarrhea; fast heartbeats, wheezing, difficult breathing; swelling of your face, lips, tongue, or throat. An allergic reaction is more likely to occur within 30 minutes after you receive the vaccine. You will be treated quickly if you have a reaction. You should not receive the second vaccine if the first shot caused an allergic reaction. Your healthcare provider will determine if you can safely receive the second dose. Becoming infected with COVID-19 is much more dangerous to your health than receiving this vaccine. Serious side effects other than an allergic reaction may include:  · pale or clammy skin, sweating, feeling warm or cold;  · feeling anxious, nauseated, weak, or light-headed;  · slow heartbeats, rapid breathing; or  · changes in vision or hearing. Fever may be a normal symptom as your body begins to develop immunity to COVID-19. However, you should call your doctor right away if you have any side effects that concern you. Common side effects may include:  · fever, chills, swollen glands;  · swelling or tenderness under your arm;  · pain, redness, or swelling where the shot was given;  · nausea, vomiting;  · feeling tired; or  · headache, muscle pain, joint pain. You may be able to treat these effects with an over-the-counter pain reliever such as acetaminophen (Tylenol) or ibuprofen (Motrin, Advil, and others). Follow the label directions or your vaccination provider's instructions. Other side effects, mild or serious, may occur with more widespread use of COVID-19 vaccine. This is not a complete list of side effects and others may occur. Call your doctor for medical advice about side effects.  You may report vaccine side effects to the [address] [address] and Human Services at [phone]. You may also use a smartphone-based program called V-safe to communicate with the Centers for Disease Control and Prevention (CDC) about any health problems you have after receiving a COVID-19 vaccine: www.cdc.gov/vsafe. What other drugs will affect this vaccine? Before receiving this vaccine, tell your vaccination provider about all other vaccines you have received in the past 14 days. Also tell the provider about all your current medicines. This includes prescription and over-the-counter medicines, vitamins, and herbal products. Not all possible interactions are listed here. Where can I get more information? Your vaccination provider, pharmacist, or doctor can provide more information about this vaccine. Additional information is available from your local health department or the Centers for Disease Control and Prevention. Remember, keep this and all other medicines out of the reach of children, never share your medicines with others, and use this medication only for the indication prescribed. Every effort has been made to ensure that the information provided by [address] is accurate, up-to-date, and complete, but no guarantee is made to that effect. Drug information contained herein may be time sensitive. [facility] information has been compiled for use by healthcare practitioners and consumers in the [place] and therefore [employer] does not warrant that uses outside of the [place] are appropriate, unless specifically indicated otherwise. [facility] drug information does not endorse drugs, diagnose patients or recommend therapy. [facility] drug information is an informational resource designed to assist licensed healthcare practitioners in caring for their patients and/or to serve consumers viewing this service as a supplement to, and not a substitute for, the expertise, skill, knowledge and judgment of healthcare practitioners.  The absence of a warning for a given drug or drug combination in no way should be construed to indicate that the drug or drug combination is safe, effective or appropriate for any given patient. [facility] does not assume any responsibility for any aspect of healthcare administered with the aid of information [facility] provides. The information contained herein is not intended to cover all possible uses, directions, precautions, warnings, drug interactions, allergic reactions, or adverse effects. If you have questions about the drugs you are taking, check with your doctor, nurse or pharmacist.  Copyright 8680-5392 [address]: 2.01. Revision date: 3/8/2021. Care instructions adapted under license by [facility] ([facility]). If you have questions about a medical condition or this instruction, always ask your healthcare professional. [place] [address] any warranty or liability for your use of this information.

## 2021-11-09 NOTE — PROGRESS NOTES
Rory Ruano (:  1980) is a 46 y.o. female,Established patient, here for evaluation of the following chief complaint(s):  ADHD (pt would like ama to take over the adhd meds )    Pt goes to [address] [place], [place] [address] for psych for years. On Vyvanse and doing well. States that they were not taking her insurance anymore so she needed to find another provider to prescribe. She states she doesn't need/use the xanax anymore. Still has a couple doses left from last script. Pt states she stopped nortriptyline because she had bad anxiety with it. States scopolamine patch is working great, has not needed phenergan. Still would like the referral to GI as she was told due to her intractable nausea and vomiting, should be seen. Lost the number she was originally given from the ER. Pt states that she needs us to speak to the Rheumatologist in regards to her referral so she can schedules. She has fatigue, joint swelling (hands and bilateral knees mostly), butterfly rash. Neuro made the original referral.        ASSESSMENT/PLAN:  1. Attention deficit hyperactivity disorder (ADHD), unspecified ADHD type  2. Intractable vomiting with nausea  -     AFL - Pina Horvath DO, Gastroenterology, [facility]      Return in about 3 months (around 2022) for Med Check, ADHD Follow-up. Controlled Substance Monitoring:    Acute and Chronic Pain Monitoring:   RX Monitoring 2021   Attestation -   Periodic Controlled Substance Monitoring Possible medication side effects, risk of tolerance/dependence & alternative treatments discussed. ;No signs of potential drug abuse or diversion identified. Subjective   SUBJECTIVE/OBJECTIVE:    Review of Systems   Constitutional: Negative for chills, diaphoresis, fatigue and fever. Respiratory: Negative for cough and shortness of breath. Cardiovascular: Negative for chest pain. Gastrointestinal: Negative for abdominal pain, nausea and vomiting.    Musculoskeletal:

## 2021-11-10 ENCOUNTER — PATIENT MESSAGE (OUTPATIENT)
Dept: PRIMARY CARE CLINIC | Age: 41
End: 2021-11-10

## 2021-11-17 ENCOUNTER — TELEPHONE (OUTPATIENT)
Dept: PRIMARY CARE CLINIC | Age: 41
End: 2021-11-17

## 2021-11-17 NOTE — TELEPHONE ENCOUNTER
Pt called to let us know that [facility] and called to [facility] about the results and is freaking out about the results. I told her that she needs to do what she thinks is best and take care of herself. Told her that ama is not here.  She stated that she is not going to hospital and just wanted to call and let us know

## 2021-11-18 NOTE — TELEPHONE ENCOUNTER
Reviewed images. The \"discontinuity\" that she is concerned about is expected and normal per the reports.  If she has additional concerns, should follow up with neuro

## 2021-11-27 ENCOUNTER — HOSPITAL ENCOUNTER (EMERGENCY)
Age: 41
Discharge: HOME | End: 2021-11-27
Attending: EMERGENCY MEDICINE
Payer: COMMERCIAL

## 2021-11-27 ENCOUNTER — APPOINTMENT (OUTPATIENT)
Dept: GENERAL RADIOLOGY | Age: 41
End: 2021-11-27
Payer: COMMERCIAL

## 2021-11-27 VITALS
DIASTOLIC BLOOD PRESSURE: 95 MMHG | BODY MASS INDEX: 32.18 KG/M2 | RESPIRATION RATE: 16 BRPM | HEIGHT: 59 IN | OXYGEN SATURATION: 99 % | SYSTOLIC BLOOD PRESSURE: 136 MMHG | TEMPERATURE: 98.2 F | WEIGHT: 159.61 LBS | HEART RATE: 84 BPM

## 2021-11-27 DIAGNOSIS — R10.9 RIGHT FLANK PAIN: Primary | ICD-10-CM

## 2021-11-27 DIAGNOSIS — R11.2 NON-INTRACTABLE VOMITING WITH NAUSEA, UNSPECIFIED VOMITING TYPE: ICD-10-CM

## 2021-11-27 DIAGNOSIS — E87.6 HYPOKALEMIA: ICD-10-CM

## 2021-11-27 DIAGNOSIS — Z98.2 S/P VP SHUNT: ICD-10-CM

## 2021-11-27 LAB
A/G RATIO: 1.6 (ref 1.1–2.2)
ALBUMIN SERPL-MCNC: 4.3 G/DL (ref 3.4–5)
ALP BLD-CCNC: 106 U/L (ref 40–129)
ALT SERPL-CCNC: 42 U/L (ref 10–40)
ANION GAP SERPL CALCULATED.3IONS-SCNC: 11 MMOL/L (ref 3–16)
AST SERPL-CCNC: 20 U/L (ref 15–37)
BASOPHILS ABSOLUTE: 0.1 K/UL (ref 0–0.2)
BASOPHILS RELATIVE PERCENT: 0.6 %
BILIRUB SERPL-MCNC: 0.4 MG/DL (ref 0–1)
BILIRUBIN URINE: NEGATIVE
BLOOD, URINE: NEGATIVE
BUN BLDV-MCNC: 20 MG/DL (ref 7–20)
CALCIUM SERPL-MCNC: 9.5 MG/DL (ref 8.3–10.6)
CHLORIDE BLD-SCNC: 104 MMOL/L (ref 99–110)
CLARITY: CLEAR
CO2: 28 MMOL/L (ref 21–32)
COLOR: YELLOW
CREAT SERPL-MCNC: 0.6 MG/DL (ref 0.6–1.1)
EOSINOPHILS ABSOLUTE: 0.1 K/UL (ref 0–0.6)
EOSINOPHILS RELATIVE PERCENT: 0.4 %
GFR AFRICAN AMERICAN: >60
GFR NON-AFRICAN AMERICAN: >60
GLUCOSE BLD-MCNC: 121 MG/DL (ref 70–99)
GLUCOSE URINE: NEGATIVE MG/DL
HCT VFR BLD CALC: 43.1 % (ref 36–48)
HEMOGLOBIN: 14.5 G/DL (ref 12–16)
KETONES, URINE: NEGATIVE MG/DL
LEUKOCYTE ESTERASE, URINE: NEGATIVE
LIPASE: 15 U/L (ref 13–60)
LYMPHOCYTES ABSOLUTE: 1.8 K/UL (ref 1–5.1)
LYMPHOCYTES RELATIVE PERCENT: 10.6 %
MAGNESIUM: 2.1 MG/DL (ref 1.8–2.4)
MCH RBC QN AUTO: 28.9 PG (ref 26–34)
MCHC RBC AUTO-ENTMCNC: 33.6 G/DL (ref 31–36)
MCV RBC AUTO: 86.1 FL (ref 80–100)
MICROSCOPIC EXAMINATION: NORMAL
MONOCYTES ABSOLUTE: 0.3 K/UL (ref 0–1.3)
MONOCYTES RELATIVE PERCENT: 1.6 %
NEUTROPHILS ABSOLUTE: 14.4 K/UL (ref 1.7–7.7)
NEUTROPHILS RELATIVE PERCENT: 86.8 %
NITRITE, URINE: NEGATIVE
PDW BLD-RTO: 15.1 % (ref 12.4–15.4)
PH UA: 6 (ref 5–8)
PLATELET # BLD: 291 K/UL (ref 135–450)
PMV BLD AUTO: 7.9 FL (ref 5–10.5)
POTASSIUM REFLEX MAGNESIUM: 3.4 MMOL/L (ref 3.5–5.1)
PROTEIN UA: NEGATIVE MG/DL
RBC # BLD: 5.01 M/UL (ref 4–5.2)
SODIUM BLD-SCNC: 143 MMOL/L (ref 136–145)
SPECIFIC GRAVITY UA: 1.02 (ref 1–1.03)
TOTAL PROTEIN: 7 G/DL (ref 6.4–8.2)
URINE REFLEX TO CULTURE: NORMAL
URINE TYPE: NORMAL
UROBILINOGEN, URINE: 0.2 E.U./DL
WBC # BLD: 16.5 K/UL (ref 4–11)

## 2021-11-27 PROCEDURE — 83735 ASSAY OF MAGNESIUM: CPT

## 2021-11-27 PROCEDURE — 74018 RADEX ABDOMEN 1 VIEW: CPT

## 2021-11-27 PROCEDURE — 96375 TX/PRO/DX INJ NEW DRUG ADDON: CPT

## 2021-11-27 PROCEDURE — 36415 COLL VENOUS BLD VENIPUNCTURE: CPT

## 2021-11-27 PROCEDURE — 96374 THER/PROPH/DIAG INJ IV PUSH: CPT

## 2021-11-27 PROCEDURE — 6360000002 HC RX W HCPCS: Performed by: EMERGENCY MEDICINE

## 2021-11-27 PROCEDURE — 99284 EMERGENCY DEPT VISIT MOD MDM: CPT

## 2021-11-27 PROCEDURE — 2580000003 HC RX 258: Performed by: EMERGENCY MEDICINE

## 2021-11-27 PROCEDURE — 80053 COMPREHEN METABOLIC PANEL: CPT

## 2021-11-27 PROCEDURE — 81003 URINALYSIS AUTO W/O SCOPE: CPT

## 2021-11-27 PROCEDURE — 83690 ASSAY OF LIPASE: CPT

## 2021-11-27 PROCEDURE — 85025 COMPLETE CBC W/AUTO DIFF WBC: CPT

## 2021-11-27 RX ORDER — OXYCODONE HYDROCHLORIDE AND ACETAMINOPHEN 5; 325 MG/1; MG/1
TABLET ORAL
COMMUNITY
Start: 2021-11-24 | End: 2022-02-28

## 2021-11-27 RX ORDER — PREDNISONE 10 MG/1
10 TABLET ORAL SEE ADMIN INSTRUCTIONS
COMMUNITY
Start: 2021-11-23 | End: 2022-06-10

## 2021-11-27 RX ORDER — PROMETHAZINE HYDROCHLORIDE 25 MG/ML
25 INJECTION, SOLUTION INTRAMUSCULAR; INTRAVENOUS ONCE
Status: COMPLETED | OUTPATIENT
Start: 2021-11-27 | End: 2021-11-27

## 2021-11-27 RX ORDER — 0.9 % SODIUM CHLORIDE 0.9 %
500 INTRAVENOUS SOLUTION INTRAVENOUS ONCE
Status: COMPLETED | OUTPATIENT
Start: 2021-11-27 | End: 2021-11-27

## 2021-11-27 RX ORDER — PROMETHAZINE HYDROCHLORIDE 6.25 MG/5ML
25 SYRUP ORAL 4 TIMES DAILY PRN
Qty: 240 ML | Refills: 0 | Status: SHIPPED | OUTPATIENT
Start: 2021-11-27 | End: 2021-12-04

## 2021-11-27 RX ORDER — PROMETHAZINE HYDROCHLORIDE 12.5 MG/1
TABLET ORAL
COMMUNITY
Start: 2021-11-24 | End: 2021-11-27

## 2021-11-27 RX ORDER — FENTANYL CITRATE 50 UG/ML
25 INJECTION, SOLUTION INTRAMUSCULAR; INTRAVENOUS ONCE
Status: COMPLETED | OUTPATIENT
Start: 2021-11-27 | End: 2021-11-27

## 2021-11-27 RX ADMIN — SODIUM CHLORIDE 500 ML: 9 INJECTION, SOLUTION INTRAVENOUS at 20:47

## 2021-11-27 RX ADMIN — FENTANYL CITRATE 25 MCG: 50 INJECTION INTRAMUSCULAR; INTRAVENOUS at 20:48

## 2021-11-27 RX ADMIN — PROMETHAZINE HYDROCHLORIDE 25 MG: 25 INJECTION INTRAMUSCULAR; INTRAVENOUS at 20:48

## 2021-11-27 ASSESSMENT — PAIN DESCRIPTION - LOCATION: LOCATION: FLANK

## 2021-11-27 ASSESSMENT — PAIN DESCRIPTION - PAIN TYPE: TYPE: CHRONIC PAIN

## 2021-11-27 ASSESSMENT — PAIN SCALES - GENERAL
PAINLEVEL_OUTOF10: 9
PAINLEVEL_OUTOF10: 6
PAINLEVEL_OUTOF10: 9

## 2021-11-27 ASSESSMENT — PAIN DESCRIPTION - DESCRIPTORS: DESCRIPTORS: DISCOMFORT

## 2021-11-27 ASSESSMENT — PAIN DESCRIPTION - ORIENTATION: ORIENTATION: RIGHT

## 2021-11-28 NOTE — ED PROVIDER NOTES
CHIEF COMPLAINT  Flank Pain (c/o right flank pain w n/v x 1 month/ taking percocet already)      HISTORY OF PRESENT ILLNESS  Rory Ruano is a 46 y.o. female presents to the ED with right flank/abdominal pain, reports she has had nausea/vomiting, has not really eaten much in the past couple days because of this, has had this pain before, she has a  shunt, has had this evaluated multiple times, states she is not having issues with that today, no dysuria/hematuria/urgency/frequency, she has an appointment coming up with OB/GYN in a couple days, no vaginal bleeding or discharge, no known fevers, no Covid concerns, has been vaccinated, no chest pain or shortness of breath, no diarrhea, no melena/hematochezia, she does take Percocet at home for pain, just had not been able to keep them down today, reports she has an appointment with urology for procedure on 12/10/2021. No other complaints, modifying factors or associated symptoms. I have reviewed the following from the nursing documentation. Past Medical History:   Diagnosis Date    ADHD (attention deficit hyperactivity disorder) 82    Depression with anxiety     Diabetes mellitus ([address]     pre-diabetes    Difficult intubation     Encounter for imaging to screen for metal prior to MRI 06/01/2021    MRI Conditional Medtronic Non-Programmable shunt model#[id number] implanted 10/30/2020 at [facility]. Normal Mode. 1.5T or 3.0T.    ESBL (extended spectrum beta-lactamase) producing bacteria infection 11/06/2019    urine    Functional ovarian cysts 2008    rt ovary cyst x 2 yrs.     Headache(784.0)     migraines    History of blood transfusion     at birth    History of kidney stones     History of PCOS     Hydrocephalus ([address]     Hyperlipidemia     Irritable bowel syndrome 2004    had colonoscopy about 6 yrs ago    Meningitis 92/3746    Neutrophilic leukocytosis     Nicotine dependence     PONV (postoperative nausea and vomiting) Thyroid Disease Neg Hx      Social History     Socioeconomic History    Marital status: Single     Spouse name: Not on file    Number of children: Not on file    Years of education: Not on file    Highest education level: Not on file   Occupational History    Occupation: disability, SSI    Tobacco Use    Smoking status: Current Every Day Smoker     Packs/day: 0.50     Years: 18.00     Pack years: 9.00     Types: Cigarettes    Smokeless tobacco: Never Used   Vaping Use    Vaping Use: Never used   Substance and Sexual Activity    Alcohol use: No     Alcohol/week: 0.0 standard drinks    Drug use: Never    Sexual activity: Not Currently     Partners: Male   Other Topics Concern    Not on file   Social History Narrative    Not on file     Social Determinants of Health     Financial Resource Strain: Low Risk     Difficulty of Paying Living Expenses: Not hard at all   Food Insecurity: No Food Insecurity    Worried About Running Out of Food in the Last Year: Never true    [place] of Food in the Last Year: Never true   Transportation Needs:     Lack of Transportation (Medical): Not on file    Lack of Transportation (Non-Medical):  Not on file   Physical Activity:     Days of Exercise per Week: Not on file    Minutes of Exercise per Session: Not on file   Stress:     Feeling of Stress : Not on file   Social Connections:     Frequency of Communication with Friends and Family: Not on file    Frequency of Social Gatherings with Friends and Family: Not on file    Attends Latter-day Services: Not on file    Active Member of Clubs or Organizations: Not on file    Attends Club or Organization Meetings: Not on file    Marital Status: Not on file   Intimate Partner Violence:     Fear of Current or Ex-Partner: Not on file    Emotionally Abused: Not on file    Physically Abused: Not on file    Sexually Abused: Not on file   Housing Stability:     Unable to Pay for Housing in the Last Year: Not on file    Number of Places Lived in the Last Year: Not on file    Unstable Housing in the Last Year: Not on file     Current Facility-Administered Medications   Medication Dose Route Frequency Provider Last Rate Last Admin    potassium bicarb-citric acid (EFFER-K) effervescent tablet 20 mEq  20 mEq Oral Once Sheilda Host, DO         Current Outpatient Medications   Medication Sig Dispense Refill    oxyCODONE-acetaminophen (PERCOCET) 5-325 MG per tablet       promethazine (PHENERGAN) 6.25 MG/5ML syrup Take 20 mLs by mouth 4 times daily as needed for Nausea 240 mL 0    predniSONE (DELTASONE) 10 MG tablet Take 10 mg by mouth See Admin Instructions      triamcinolone (KENALOG) 0.1 % ointment       traMADol (ULTRAM) 50 MG tablet Take 50 mg by mouth every 6 hours as needed for Pain.  scopolamine (TRANSDERM-SCOP) transdermal patch Place 1 patch onto the skin every 72 hours 30 patch 0    VYVANSE 50 MG capsule Take 50 mg by mouth every morning.  simvastatin (ZOCOR) 10 MG tablet Take 1 tablet by mouth nightly 90 tablet 1     Allergies   Allergen Reactions    Bee Venom Shortness Of Breath and Swelling    Bentyl [Dicyclomine Hcl] Shortness Of Breath and Anxiety    Dicyclomine Anxiety and Shortness Of Breath    Ketorolac Tromethamine Hives and Itching     Injectable only  Tolerates PO NSAIDs fine    Maitake Anaphylaxis    Mushroom Extract Complex Anaphylaxis     Can't eat mushrooms.  Oxycodone-Acetaminophen Nausea And Vomiting     Tolerates Norco/Vicodin ok  Patient can not tolerate Percocet well.  Extreme vomiting      Sulfa Antibiotics Shortness Of Breath    Vancomycin Anaphylaxis and Hives    Adhesive Tape Dermatitis     Other reaction(s): Dermatitis    Dicyclomine Hcl     Haldol [Haloperidol]      \"Freaks Out\"    Hydrocodone Hives     Tolerates morphine       Silicone Hives    Sulfacetamide Hives    Acetaminophen Anxiety     Patient reports when taking acetaminophen (including Norco/Percocet) she gets severe anxiety when taking this medication.  Butalbital-Apap-Caff-Cod Anxiety    Ceftaroline Rash    Daptomycin Swelling and Rash    Ketamine Anxiety and Nausea And Vomiting    Levaquin [Levofloxacin] Anxiety    Methocarbamol Rash    Prochlorperazine Anxiety    Reglan [Metoclopramide] Anxiety    Tazobactam Nausea And Vomiting and Anxiety    Zosyn [Piperacillin Sod-Tazobactam So] Hives     Also causes nausea, SOB, dizziness       REVIEW OF SYSTEMS  10 systems reviewed, pertinent positives per HPI otherwise noted to be negative. PHYSICAL EXAM  /86   Pulse 93   Temp 98.2 °F (36.8 °C) (Oral)   Resp 16   Ht 4' 11\" (1.499 m)   Wt 159 lb 9.8 oz (72.4 kg)   LMP 10/31/2016 (Exact Date)   SpO2 98%   BMI 32.24 kg/m²   GENERAL APPEARANCE: Awake and alert. Cooperative. No acute distress, but pacing and holding her abdomen on the right  HEAD: Normocephalic. Atraumatic. EYES: PERRL. EOM's grossly intact. ENT: Mucous membranes are moist.  Airway patent, no stridor  NECK: Supple. No rigidity  HEART: RRR. No murmurs  LUNGS: Respirations unlabored. Lungs are clear to auscultation bilaterally. ABDOMEN: Soft. Non-distended. Tenderness over right upper quadrant/right flank, no CVA tenderness, negative Blue sign, negative Rovsing sign, negative McBurney's point tenderness, she does have a slight fullness on the right side of abdomen, no erythema or skin changes, rotund abdomen. No guarding or rebound. Normal bowel sounds. EXTREMITIES: No peripheral edema. Moves all extremities equally. All extremities neurovascularly intact. SKIN: Warm and dry. No acute rashes. NEUROLOGICAL: Alert and oriented. No gross facial drooping. Strength 5/5, sensation intact. No truncal ataxia. Normal speech, steady gait  PSYCHIATRIC: Normal mood and affect. LABS  I have reviewed all labs for this visit.    Results for orders placed or performed during the hospital encounter of 11/27/21   CBC Auto Differential   Result Value Ref Range    WBC 16.5 (H) 4.0 - 11.0 K/uL    RBC 5.01 4.00 - 5.20 M/uL    Hemoglobin 14.5 12.0 - 16.0 g/dL    Hematocrit 43.1 36.0 - 48.0 %    MCV 86.1 80.0 - 100.0 fL    MCH 28.9 26.0 - 34.0 pg    MCHC 33.6 31.0 - 36.0 g/dL    RDW 15.1 12.4 - 15.4 %    Platelets 395 077 - 040 K/uL    MPV 7.9 5.0 - 10.5 fL    Neutrophils % 86.8 %    Lymphocytes % 10.6 %    Monocytes % 1.6 %    Eosinophils % 0.4 %    Basophils % 0.6 %    Neutrophils Absolute 14.4 (H) 1.7 - 7.7 K/uL    Lymphocytes Absolute 1.8 1.0 - 5.1 K/uL    Monocytes Absolute 0.3 0.0 - 1.3 K/uL    Eosinophils Absolute 0.1 0.0 - 0.6 K/uL    Basophils Absolute 0.1 0.0 - 0.2 K/uL   Comprehensive Metabolic Panel w/ Reflex to MG   Result Value Ref Range    Sodium 143 136 - 145 mmol/L    Potassium reflex Magnesium 3.4 (L) 3.5 - 5.1 mmol/L    Chloride 104 99 - 110 mmol/L    CO2 28 21 - 32 mmol/L    Anion Gap 11 3 - 16    Glucose 121 (H) 70 - 99 mg/dL    BUN 20 7 - 20 mg/dL    CREATININE 0.6 0.6 - 1.1 mg/dL    GFR Non-African American >60 >60    GFR African American >60 >60    Calcium 9.5 8.3 - 10.6 mg/dL    Total Protein 7.0 6.4 - 8.2 g/dL    Albumin 4.3 3.4 - 5.0 g/dL    Albumin/Globulin Ratio 1.6 1.1 - 2.2    Total Bilirubin 0.4 0.0 - 1.0 mg/dL    Alkaline Phosphatase 106 40 - 129 U/L    ALT 42 (H) 10 - 40 U/L    AST 20 15 - 37 U/L   Lipase   Result Value Ref Range    Lipase 15.0 13.0 - 60.0 U/L   Urinalysis Reflex to Culture    Specimen: Urine, clean catch   Result Value Ref Range    Color, UA Yellow Straw/Yellow    Clarity, UA Clear Clear    Glucose, Ur Negative Negative mg/dL    Bilirubin Urine Negative Negative    Ketones, Urine Negative Negative mg/dL    Specific Gravity, UA 1.020 1.005 - 1.030    Blood, Urine Negative Negative    pH, UA 6.0 5.0 - 8.0    Protein, UA Negative Negative mg/dL    Urobilinogen, Urine 0.2 <2.0 E.U./dL    Nitrite, Urine Negative Negative    Leukocyte Esterase, Urine Negative Negative    Microscopic Examination Not Indicated     Urine Type NotGiven     Urine Reflex to Culture Not Indicated    Magnesium   Result Value Ref Range    Magnesium 2.10 1.80 - 2.40 mg/dL       RADIOLOGY  XR ABDOMEN (KUB) (SINGLE AP VIEW)    Result Date: 11/27/2021  EXAMINATION: ONE SUPINE XRAY VIEW(S) OF THE ABDOMEN 11/27/2021 7:58 pm COMPARISON: None. HISTORY: ORDERING SYSTEM PROVIDED HISTORY: hx of R renal calculus, R abd pain TECHNOLOGIST PROVIDED HISTORY: Reason for exam:->hx of R renal calculus, R abd pain Reason for Exam: right flank pain w n/v x 1 month Acuity: Acute Type of Exam: Initial FINDINGS: The gas pattern is unremarkable. No renal stones are seen. There is no free air. There Is catheter tubing projecting along the right upper abdomen extending along the lower left pelvis. There is no free air. There are surgical clips along the right upper quadrant. Nonspecific gas pattern Shunt catheter tubing projecting along the abdomen and pelvis. Recommend clinical follow-up. Status post cholecystectomy. ED COURSE/MDM  Patient seen and evaluated. Old records reviewed. Labs and imaging reviewed and results discussed with patient. 73-year-old female who is a frequent ED visitor, reports right upper quadrant/flank pain, she has had numerous CT scans, and after risk-benefit analysis she declined need for CT again today, discussed risks of radiation exposure with repeated CT scans, opted for KUB, no significant change with her shunt, nonspecific bowel gas pattern, no visible evidence of ureteral stone, I spoke with Dr. Swapna Dexter with urology, we discussed her labs and imaging findings, he reported patient could follow-up as an outpatient and go to her planned procedure on December 10.   She was given pain and nausea meds here, was feeling better prior to discharge, blood pressure a little elevated but otherwise normal vitals, heart rate normalized after fluids, she is able to tolerate p.o. after meds, afebrile nontoxic-appearing, no current suspicion for acute surgical abdominal process, nonperitoneal abdominal exam, ordered potassium replacement, she did request Phenergan syrup for home, prescription provided, she again had leukocytosis, but no current suspicion for acute infectious process, encouraged blood work recheck, strict return precautions given, all questions answered, will return if any worsening symptoms or new concerns, see AVS for further discharge information, patient verbalized understanding of plan, felt comfortable going home. Orders Placed This Encounter   Procedures    XR ABDOMEN (KUB) (SINGLE AP VIEW)    CBC Auto Differential    Comprehensive Metabolic Panel w/ Reflex to MG    Lipase    Urinalysis Reflex to Culture    Magnesium    Inpatient consult to Urology     Orders Placed This Encounter   Medications    promethazine (PHENERGAN) injection 25 mg    fentaNYL (SUBLIMAZE) injection 25 mcg    0.9 % sodium chloride bolus    DISCONTD: potassium bicarb-citric acid (EFFER-K) effervescent tablet 20 mEq    promethazine (PHENERGAN) 6.25 MG/5ML syrup     Sig: Take 20 mLs by mouth 4 times daily as needed for Nausea     Dispense:  240 mL     Refill:  0          CLINICAL IMPRESSION  1. Right flank pain    2. Non-intractable vomiting with nausea, unspecified vomiting type    3. S/P  shunt    4. Hypokalemia        Blood pressure 127/86, pulse 93, temperature 98.2 °F (36.8 °C), temperature source Oral, resp. rate 16, height 4' 11\" (1.499 m), weight 159 lb 9.8 oz (72.4 kg), last menstrual period 10/31/2016, SpO2 98 %, unknown if currently breastfeeding. Isabel Sanford was discharged to home in stable condition.                   Gabe Gloria,   12/06/21 5008

## 2021-11-28 NOTE — ED NOTES
Pt up walking around without any difficulties/ No vomiting noted during this ER visit     Jennifer Patton RN  11/27/21 [id number]

## 2021-11-28 NOTE — ED NOTES
Call out to urology Dr. Daina Zacarias @ [facility] [phone].  Spoke to on call service/ on call [phone] Flori Ocampo RN  11/27/21 [id number]

## 2021-11-29 ENCOUNTER — TELEPHONE (OUTPATIENT)
Dept: PRIMARY CARE CLINIC | Age: 41
End: 2021-11-29

## 2021-11-29 ENCOUNTER — OFFICE VISIT (OUTPATIENT)
Dept: GYNECOLOGY | Age: 41
End: 2021-11-29
Payer: COMMERCIAL

## 2021-11-29 VITALS
SYSTOLIC BLOOD PRESSURE: 148 MMHG | DIASTOLIC BLOOD PRESSURE: 102 MMHG | BODY MASS INDEX: 32.11 KG/M2 | TEMPERATURE: 102 F | WEIGHT: 159 LBS

## 2021-11-29 DIAGNOSIS — R87.810 ASCUS WITH POSITIVE HIGH RISK HPV CERVICAL: Primary | ICD-10-CM

## 2021-11-29 DIAGNOSIS — R87.610 ASCUS WITH POSITIVE HIGH RISK HPV CERVICAL: Primary | ICD-10-CM

## 2021-11-29 PROCEDURE — 4004F PT TOBACCO SCREEN RCVD TLK: CPT | Performed by: OBSTETRICS & GYNECOLOGY

## 2021-11-29 PROCEDURE — G8417 CALC BMI ABV UP PARAM F/U: HCPCS | Performed by: OBSTETRICS & GYNECOLOGY

## 2021-11-29 PROCEDURE — G8427 DOCREV CUR MEDS BY ELIG CLIN: HCPCS | Performed by: OBSTETRICS & GYNECOLOGY

## 2021-11-29 PROCEDURE — 99213 OFFICE O/P EST LOW 20 MIN: CPT | Performed by: OBSTETRICS & GYNECOLOGY

## 2021-11-29 PROCEDURE — G8484 FLU IMMUNIZE NO ADMIN: HCPCS | Performed by: OBSTETRICS & GYNECOLOGY

## 2021-11-29 NOTE — TELEPHONE ENCOUNTER
Call from patient on Saturday 11/27/21 at 1627. She spoke of many issues concerning her, but most of all abdominal pain and swelling. She was recently seen the ER for other concerns.

## 2021-11-29 NOTE — PROGRESS NOTES
Subjective:      Patient ID: Johnny Street is a 46 y.o. female. HPI  pts here for f/u pap from [facility]us. Denies complaints. Previously treated for an infected shunt. Review of Systems Pertinent review of systems items discussed above. All others systems items not discussed above were negative. Objective:   Physical Exam    Af, vss  Ext- no lesions  Meatus- no lesions  Bladder- good support  Vag- no d/c, cuff without lesions  cx- absent  pap  Assessment:   H/o ascus     Plan:   Call with results. F/u pap in four months.        Richy Reese MD

## 2021-11-30 DIAGNOSIS — F90.9 ATTENTION DEFICIT HYPERACTIVITY DISORDER (ADHD), UNSPECIFIED ADHD TYPE: Primary | ICD-10-CM

## 2021-11-30 RX ORDER — LISDEXAMFETAMINE DIMESYLATE 50 MG
50 CAPSULE ORAL EVERY MORNING
Qty: 30 CAPSULE | Refills: 0 | Status: SHIPPED | OUTPATIENT
Start: 2021-11-30 | End: 2022-01-03 | Stop reason: SDUPTHER

## 2021-11-30 NOTE — TELEPHONE ENCOUNTER
Pt is calling for a refill on her adhd medication. This will be the first time we refill this. Have received the records from [facility].  Hs

## 2021-12-01 LAB
HPV COMMENT: ABNORMAL
HPV TYPE 16: NOT DETECTED
HPV TYPE 18: NOT DETECTED
HPVOH (OTHER TYPES): DETECTED

## 2021-12-08 ENCOUNTER — TELEPHONE (OUTPATIENT)
Dept: PRIMARY CARE CLINIC | Age: 41
End: 2021-12-08

## 2021-12-08 NOTE — TELEPHONE ENCOUNTER
Pt called and stated that her neurologist is telling her that there is nothing else that he can do until she sees another allergies. And told that this allergies is not in network and does not take her issuance .  She is going to want an new neurologist and I told her that she needs to contact her insurance to make sure that they are in next work with her

## 2021-12-12 ENCOUNTER — HOSPITAL ENCOUNTER (EMERGENCY)
Age: 41
Discharge: HOME | End: 2021-12-12
Payer: COMMERCIAL

## 2021-12-12 ENCOUNTER — APPOINTMENT (OUTPATIENT)
Dept: CT IMAGING | Age: 41
End: 2021-12-12
Payer: COMMERCIAL

## 2021-12-12 VITALS
WEIGHT: 155.2 LBS | HEART RATE: 86 BPM | OXYGEN SATURATION: 98 % | RESPIRATION RATE: 18 BRPM | TEMPERATURE: 97.9 F | DIASTOLIC BLOOD PRESSURE: 87 MMHG | BODY MASS INDEX: 31.35 KG/M2 | SYSTOLIC BLOOD PRESSURE: 118 MMHG

## 2021-12-12 DIAGNOSIS — N39.0 ACUTE URINARY TRACT INFECTION: ICD-10-CM

## 2021-12-12 DIAGNOSIS — R10.2 SUPRAPUBIC PAIN: Primary | ICD-10-CM

## 2021-12-12 DIAGNOSIS — Z98.890 STATUS POST CYSTOSCOPY: ICD-10-CM

## 2021-12-12 LAB
A/G RATIO: 1.3 (ref 1.1–2.2)
ALBUMIN SERPL-MCNC: 3.8 G/DL (ref 3.4–5)
ALP BLD-CCNC: 98 U/L (ref 40–129)
ALT SERPL-CCNC: 18 U/L (ref 10–40)
ANION GAP SERPL CALCULATED.3IONS-SCNC: 14 MMOL/L (ref 3–16)
AST SERPL-CCNC: 28 U/L (ref 15–37)
BACTERIA: ABNORMAL /HPF
BASOPHILS ABSOLUTE: 0.1 K/UL (ref 0–0.2)
BASOPHILS RELATIVE PERCENT: 0.6 %
BILIRUB SERPL-MCNC: 0.4 MG/DL (ref 0–1)
BILIRUBIN URINE: NEGATIVE
BLOOD, URINE: ABNORMAL
BUN BLDV-MCNC: 8 MG/DL (ref 7–20)
CALCIUM SERPL-MCNC: 9.5 MG/DL (ref 8.3–10.6)
CHLORIDE BLD-SCNC: 102 MMOL/L (ref 99–110)
CLARITY: ABNORMAL
CO2: 24 MMOL/L (ref 21–32)
COLOR: YELLOW
CREAT SERPL-MCNC: 0.5 MG/DL (ref 0.6–1.1)
EOSINOPHILS ABSOLUTE: 0.1 K/UL (ref 0–0.6)
EOSINOPHILS RELATIVE PERCENT: 1.1 %
EPITHELIAL CELLS, UA: 6 /HPF (ref 0–5)
GFR AFRICAN AMERICAN: >60
GFR NON-AFRICAN AMERICAN: >60
GLUCOSE BLD-MCNC: 100 MG/DL (ref 70–99)
GLUCOSE URINE: NEGATIVE MG/DL
HCG QUALITATIVE: NEGATIVE
HCT VFR BLD CALC: 42.5 % (ref 36–48)
HEMOGLOBIN: 14.1 G/DL (ref 12–16)
HYALINE CASTS: 8 /LPF (ref 0–8)
KETONES, URINE: NEGATIVE MG/DL
LEUKOCYTE ESTERASE, URINE: ABNORMAL
LIPASE: 11 U/L (ref 13–60)
LYMPHOCYTES ABSOLUTE: 2.3 K/UL (ref 1–5.1)
LYMPHOCYTES RELATIVE PERCENT: 21.8 %
MAGNESIUM: 2.3 MG/DL (ref 1.8–2.4)
MCH RBC QN AUTO: 28.7 PG (ref 26–34)
MCHC RBC AUTO-ENTMCNC: 33.2 G/DL (ref 31–36)
MCV RBC AUTO: 86.4 FL (ref 80–100)
MICROSCOPIC EXAMINATION: YES
MONOCYTES ABSOLUTE: 0.6 K/UL (ref 0–1.3)
MONOCYTES RELATIVE PERCENT: 5.3 %
NEUTROPHILS ABSOLUTE: 7.4 K/UL (ref 1.7–7.7)
NEUTROPHILS RELATIVE PERCENT: 71.2 %
NITRITE, URINE: POSITIVE
PDW BLD-RTO: 14.8 % (ref 12.4–15.4)
PH UA: 6 (ref 5–8)
PLATELET # BLD: 252 K/UL (ref 135–450)
PMV BLD AUTO: 7.8 FL (ref 5–10.5)
POTASSIUM REFLEX MAGNESIUM: 3.4 MMOL/L (ref 3.5–5.1)
PROTEIN UA: NEGATIVE MG/DL
RBC # BLD: 4.92 M/UL (ref 4–5.2)
RBC UA: 6 /HPF (ref 0–4)
SODIUM BLD-SCNC: 140 MMOL/L (ref 136–145)
SPECIFIC GRAVITY UA: 1.01 (ref 1–1.03)
TOTAL PROTEIN: 6.8 G/DL (ref 6.4–8.2)
URINE REFLEX TO CULTURE: YES
URINE TYPE: ABNORMAL
UROBILINOGEN, URINE: 1 E.U./DL
WBC # BLD: 10.4 K/UL (ref 4–11)
WBC UA: 40 /HPF (ref 0–5)

## 2021-12-12 PROCEDURE — 83735 ASSAY OF MAGNESIUM: CPT

## 2021-12-12 PROCEDURE — 87086 URINE CULTURE/COLONY COUNT: CPT

## 2021-12-12 PROCEDURE — 74176 CT ABD & PELVIS W/O CONTRAST: CPT

## 2021-12-12 PROCEDURE — 84703 CHORIONIC GONADOTROPIN ASSAY: CPT

## 2021-12-12 PROCEDURE — 87186 SC STD MICRODIL/AGAR DIL: CPT

## 2021-12-12 PROCEDURE — 6370000000 HC RX 637 (ALT 250 FOR IP): Performed by: PHYSICIAN ASSISTANT

## 2021-12-12 PROCEDURE — 36415 COLL VENOUS BLD VENIPUNCTURE: CPT

## 2021-12-12 PROCEDURE — 81001 URINALYSIS AUTO W/SCOPE: CPT

## 2021-12-12 PROCEDURE — 80053 COMPREHEN METABOLIC PANEL: CPT

## 2021-12-12 PROCEDURE — 83690 ASSAY OF LIPASE: CPT

## 2021-12-12 PROCEDURE — 99284 EMERGENCY DEPT VISIT MOD MDM: CPT

## 2021-12-12 PROCEDURE — 87077 CULTURE AEROBIC IDENTIFY: CPT

## 2021-12-12 PROCEDURE — 85025 COMPLETE CBC W/AUTO DIFF WBC: CPT

## 2021-12-12 RX ORDER — PROMETHAZINE HYDROCHLORIDE 12.5 MG/1
12.5 TABLET ORAL EVERY 8 HOURS PRN
Qty: 10 TABLET | Refills: 0 | Status: SHIPPED | OUTPATIENT
Start: 2021-12-12 | End: 2021-12-19

## 2021-12-12 RX ORDER — SODIUM CHLORIDE, SODIUM LACTATE, POTASSIUM CHLORIDE, AND CALCIUM CHLORIDE .6; .31; .03; .02 G/100ML; G/100ML; G/100ML; G/100ML
1000 INJECTION, SOLUTION INTRAVENOUS ONCE
Status: DISCONTINUED | OUTPATIENT
Start: 2021-12-12 | End: 2021-12-12

## 2021-12-12 RX ORDER — NITROFURANTOIN 25; 75 MG/1; MG/1
100 CAPSULE ORAL 2 TIMES DAILY
Qty: 14 CAPSULE | Refills: 0 | Status: SHIPPED | OUTPATIENT
Start: 2021-12-12 | End: 2021-12-19

## 2021-12-12 RX ORDER — ONDANSETRON 2 MG/ML
4 INJECTION INTRAMUSCULAR; INTRAVENOUS EVERY 30 MIN PRN
Status: DISCONTINUED | OUTPATIENT
Start: 2021-12-12 | End: 2021-12-12

## 2021-12-12 RX ORDER — ONDANSETRON 4 MG/1
8 TABLET, ORALLY DISINTEGRATING ORAL ONCE
Status: COMPLETED | OUTPATIENT
Start: 2021-12-12 | End: 2021-12-12

## 2021-12-12 RX ORDER — OXYCODONE HYDROCHLORIDE AND ACETAMINOPHEN 5; 325 MG/1; MG/1
2 TABLET ORAL ONCE
Status: COMPLETED | OUTPATIENT
Start: 2021-12-12 | End: 2021-12-12

## 2021-12-12 RX ADMIN — ONDANSETRON 8 MG: 4 TABLET, ORALLY DISINTEGRATING ORAL at 17:04

## 2021-12-12 RX ADMIN — OXYCODONE AND ACETAMINOPHEN 2 TABLET: 5; 325 TABLET ORAL at 17:04

## 2021-12-12 ASSESSMENT — PAIN DESCRIPTION - ONSET: ONSET: ON-GOING

## 2021-12-12 ASSESSMENT — PAIN SCALES - GENERAL
PAINLEVEL_OUTOF10: 8
PAINLEVEL_OUTOF10: 8

## 2021-12-12 ASSESSMENT — PAIN DESCRIPTION - ORIENTATION: ORIENTATION: LOWER

## 2021-12-12 ASSESSMENT — PAIN - FUNCTIONAL ASSESSMENT: PAIN_FUNCTIONAL_ASSESSMENT: PREVENTS OR INTERFERES SOME ACTIVE ACTIVITIES AND ADLS

## 2021-12-12 ASSESSMENT — PAIN DESCRIPTION - PROGRESSION: CLINICAL_PROGRESSION: NOT CHANGED

## 2021-12-12 ASSESSMENT — PAIN DESCRIPTION - DESCRIPTORS: DESCRIPTORS: ACHING

## 2021-12-12 ASSESSMENT — PAIN DESCRIPTION - FREQUENCY: FREQUENCY: CONTINUOUS

## 2021-12-12 ASSESSMENT — PAIN DESCRIPTION - LOCATION: LOCATION: ABDOMEN

## 2021-12-12 ASSESSMENT — PAIN DESCRIPTION - PAIN TYPE: TYPE: ACUTE PAIN

## 2021-12-12 NOTE — ED NOTES
D/C: Order noted for d/c. Pt confirmed d/c paperwork and RX have correct name. Discharge and education instructions reviewed with patient. Teach-back successful. Pt verbalized understanding and signed d/c papers. Pt denied questions at this time. No acute distress noted. Patient instructed to follow-up as noted - return to emergency department if symptoms worsen. Patient verbalized understanding. Discharged per EDMD with discharge instructions. Pt discharged to private vehicle. Patient stable upon departure. Thanked patient for choosing [facility] for care. Provider aware of patient pain at time of discharge.      Liberty Church RN  12/12/21 9537

## 2021-12-12 NOTE — ED PROVIDER NOTES
[address] [address] [place] [facility] [address]  Dept: [phone]  Loc: [address] ENCOUNTER        This patient was not seen or evaluated by the attending physician. I evaluated this patient, the attending physician was available for consultation. CHIEF COMPLAINT    Chief Complaint   Patient presents with    Abdominal Pain     pt with recent cystoscopy. pt wtih pain from biopsy( (procedure 2 days ago.)  dysuria       HPI    Aashish Schwartz is a 46 y.o. female who presents with pain located in the suprapubic region as well as the right flank. Onset was yesterday. The duration has been waxing and waning since the onset. The pain is associated with dark urine. The pain is 8/10 in severity. The quality of the pain is sharp and cramping. There are no alleviating factors. The context is that the patient had cystoscopy with biopsy 2 days ago. Performed by Dr. Herber Wallace. REVIEW OF SYSTEMS    GI: see HPI, + vomiting, no diarrhea, no hematochezia  Cardiac: No chest pain, shortness of breath, palpitations or syncope  Pulmonary: No difficulty breathing or new cough  General: fever 101 this AM per patient  : see HPI  See HPI for further details. All other systems reviewed and are negative. PAST MEDICAL & SURGICAL HISTORY    Past Medical History:   Diagnosis Date    ADHD (attention deficit hyperactivity disorder) 82    Depression with anxiety     Diabetes mellitus ([facility] [address]     pre-diabetes    Difficult intubation     Encounter for imaging to screen for metal prior to MRI 06/01/2021    MRI Conditional Medtronic Non-Programmable shunt model#[id number] implanted 10/30/2020 at [facility]. Normal Mode. 1.5T or 3.0T.    ESBL (extended spectrum beta-lactamase) producing bacteria infection 11/06/2019    urine    Functional ovarian cysts 2008    rt ovary cyst x 2 yrs.     Headache(784.0) migraines    History of blood transfusion     at birth    History of kidney stones     History of PCOS     Hydrocephalus ([address]     Hyperlipidemia     Irritable bowel syndrome 2004    had colonoscopy about 6 yrs ago    Meningitis     Neutrophilic leukocytosis     Nicotine dependence     PONV (postoperative nausea and vomiting)     very nauseated and sometimes wakes up with a Migraine--happened once after brain surgery    Primary osteoarthritis of left knee 2016    S/P cone biopsy of cervix     Scoliosis .s     (ventriculoperitoneal) shunt status     hydrocephalus f[facility]w [facility]geon [facility]     (ventriculoperitoneal) shunt status     Wears glasses     reading     Past Surgical History:   Procedure Laterality Date    ABDOMEN SURGERY N/A 2021    REMOVAL OF ABDOMINAL WALL MASS performed by Kylee Limon MD at [address] [place] [address]      appendicitis     SECTION  2005    placenta previa    CHOLECYSTECTOMY      COLONOSCOPY  2004    COLPOSCOPY      CSF SHUNT      replaced at age 17   [facility] CYSTOSCOPY      stone removal    HEMORRHOID SURGERY      HERNIA REPAIR Bilateral     inguinal    HERNIA REPAIR  2015    hiatal hernia    HYSTERECTOMY, TOTAL ABDOMINAL  2016    TAHBSO, adhesions/PCOS    KNEE ARTHROSCOPY Left 2015    medial meniscectomy, chondroplasty, plica resection    LITHOTRIPSY Bilateral 12/10/2019    OTHER SURGICAL HISTORY  10/19/2016    op lap    VENTRICULOPERITONEAL SHUNT      multiple revisions, most recent        CURRENT MEDICATIONS  (may include discharge medications prescribed in the ED)  Current Outpatient Rx   Medication Sig Dispense Refill    VYVANSE 50 MG capsule Take 1 capsule by mouth every morning for 30 days. Take 50 mg by mouth every morning.  30 capsule 0    oxyCODONE-acetaminophen (PERCOCET) 5-325 MG per tablet       predniSONE (DELTASONE) 10 MG tablet Take 10 mg by mouth See Admin Instructions      triamcinolone (KENALOG) 0.1 % ointment       traMADol (ULTRAM) 50 MG tablet Take 50 mg by mouth every 6 hours as needed for Pain.  scopolamine (TRANSDERM-SCOP) transdermal patch Place 1 patch onto the skin every 72 hours 30 patch 0    simvastatin (ZOCOR) 10 MG tablet Take 1 tablet by mouth nightly 90 tablet 1       ALLERGIES    Allergies   Allergen Reactions    Bee Venom Shortness Of Breath and Swelling    Bentyl [Dicyclomine Hcl] Shortness Of Breath and Anxiety    Dicyclomine Anxiety and Shortness Of Breath    Ketorolac Tromethamine Hives and Itching     Injectable only  Tolerates PO NSAIDs fine    Maitake Anaphylaxis    Mushroom Extract Complex Anaphylaxis     Can't eat mushrooms.  Oxycodone-Acetaminophen Nausea And Vomiting     Tolerates Norco/Vicodin ok  Patient can not tolerate Percocet well. Extreme vomiting      Sulfa Antibiotics Shortness Of Breath    Vancomycin Anaphylaxis and Hives    Adhesive Tape Dermatitis     Other reaction(s): Dermatitis    Dicyclomine Hcl     Haldol [Haloperidol]      \"Freaks Out\"    Hydrocodone Hives     Tolerates morphine       Silicone Hives    Sulfacetamide Hives    Acetaminophen Anxiety     Patient reports when taking acetaminophen (including Norco/Percocet) she gets severe anxiety when taking this medication.      Butalbital-Apap-Caff-Cod Anxiety    Ceftaroline Rash    Daptomycin Swelling and Rash    Ketamine Anxiety and Nausea And Vomiting    Levaquin [Levofloxacin] Anxiety    Methocarbamol Rash    Prochlorperazine Anxiety    Reglan [Metoclopramide] Anxiety    Tazobactam Nausea And Vomiting and Anxiety    Zosyn [Piperacillin Sod-Tazobactam So] Hives     Also causes nausea, SOB, dizziness       SOCIAL AND FAMILY HISTORY    Social History     Socioeconomic History    Marital status: Single     Spouse name: None    Number of children: None    Years of education: None    Highest education level: None   Occupational History    Occupation: Grandmother     High Blood Pressure Maternal Grandmother     High Cholesterol Maternal Grandmother     Heart Disease Maternal Grandfather     High Blood Pressure Maternal Grandfather     Heart Disease Paternal Grandmother     Stroke Paternal Grandfather     Depression Sister     Cirrhosis Father     Rheum Arthritis Neg Hx     Osteoarthritis Neg Hx     Asthma Neg Hx     Breast Cancer Neg Hx     Cancer Neg Hx     Heart Failure Neg Hx     Hypertension Neg Hx     Migraines Neg Hx     Ovarian Cancer Neg Hx     Rashes/Skin Problems Neg Hx     Seizures Neg Hx     Thyroid Disease Neg Hx        PHYSICAL EXAM    VITAL SIGNS: /87   Pulse 86   Temp 97.9 °F (36.6 °C) (Tympanic)   Resp 18   Wt 155 lb 3.3 oz (70.4 kg)   LMP 10/31/2016 (Exact Date)   SpO2 98%   BMI 31.35 kg/m²   Constitutional:  Well developed, well nourished, appears uncomfortable  Eyes:  Sclera nonicteric, conjunctiva moist  HENT:  Atraumatic, nose normal  Neck: no JVD  Respiratory:  No retractions, no accessory muscle use, normal breath sounds   Cardiovascular: regular rate, no murmurs  GI:  +mild suprapubic tenderness to palpation, soft, no guarding, bowel sounds present, no audible bruits or palpable pulsatile masses  Back: +mild right CVA tenderness  Musculoskeletal:  No edema, no acute deformities  Vascular: DP pulses 2+ and equal bilaterally  Integument: No rashes, skin dry  Neurologic:  Alert & oriented, no slurred speech  Psychiatric: Cooperative, pleasant affect     RADIOLOGY/PROCEDURES    CT ABDOMEN PELVIS WO CONTRAST Additional Contrast? None   Final Result   No acute abnormality. Stable 3 mm right renal stone. Sigmoid diverticulosis. RECOMMENDATIONS:   Unavailable           ED COURSE & MEDICAL DECISION MAKING    Pertinent Labs & Imaging studies reviewed and interpreted. (See chart for details)     See chart for details of medications given during the ED stay.     Vitals:    12/12/21 1230 12/12/21 1241 12/12/21 1825   BP:  (!) 121/90 118/87   Pulse:  114 86   Resp:  18 18   Temp:  97.9 °F (36.6 °C)    TempSrc:  Tympanic    SpO2:  98%    Weight: 155 lb 3.3 oz (70.4 kg) 155 lb 3.3 oz (70.4 kg)        Differential diagnosis: Abdominal Aortic Aneurysm, Ischemic Bowel, Bowel Obstruction, Appendicitis, Diverticulitis, Pyelonephritis, UTI, STD, Ureterolithiasis, Colitis, Gonad Torsion, other    Patient is afebrile and nontoxic in appearance. Labs reveal no leukocytosis or anemia. Metabolic panel unremarkable. Lipase nonelevated. Urinalysis reveals small leukocyte esterase and positive nitrites. Microscopic reveals 40 white cells with 4+ bacteria. Patient states she already has a prescription for Macrobid called into the pharmacy. CT findings as above, no acute abonormalities. Reevaluation at 6:30 PM: Patient is resting comfortably. I believe she is safe for discharge at this time. The patient was instructed to follow up as an outpatient in 1 day with urology. The patient was instructed to return to the ED immediately for any new or worsening symptoms. The patient verbalized understanding. FINAL IMPRESSION    1. Suprapubic pain    2. Status post cystoscopy    3.  Acute urinary tract infection        PLAN  Discharge with outpatient follow-up    (Please note that this note was completed with a voice recognition program.  Every attempt was made to edit the dictations, but inevitably there remain words that are mis-transcribed.)       Radha Blue  12/12/21 8168

## 2021-12-14 LAB
A/G RATIO: 1.2 (ref 1.1–2.2)
ALBUMIN SERPL-MCNC: 4 G/DL (ref 3.4–5)
ALP BLD-CCNC: 220 U/L (ref 40–129)
ALT SERPL-CCNC: 102 U/L (ref 10–40)
ANION GAP SERPL CALCULATED.3IONS-SCNC: 15 MMOL/L (ref 3–16)
AST SERPL-CCNC: 40 U/L (ref 15–37)
BASOPHILS ABSOLUTE: 0.1 K/UL (ref 0–0.2)
BASOPHILS RELATIVE PERCENT: 0.9 %
BILIRUB SERPL-MCNC: 0.3 MG/DL (ref 0–1)
BUN BLDV-MCNC: 13 MG/DL (ref 7–20)
CALCIUM SERPL-MCNC: 10 MG/DL (ref 8.3–10.6)
CHLORIDE BLD-SCNC: 100 MMOL/L (ref 99–110)
CO2: 23 MMOL/L (ref 21–32)
CREAT SERPL-MCNC: 0.5 MG/DL (ref 0.6–1.1)
EOSINOPHILS ABSOLUTE: 0.3 K/UL (ref 0–0.6)
EOSINOPHILS RELATIVE PERCENT: 3.7 %
GFR AFRICAN AMERICAN: >60
GFR NON-AFRICAN AMERICAN: >60
GLUCOSE BLD-MCNC: 113 MG/DL (ref 70–99)
HCT VFR BLD CALC: 44.3 % (ref 36–48)
HEMOGLOBIN: 15.3 G/DL (ref 12–16)
LACTIC ACID: 1.2 MMOL/L (ref 0.4–2)
LYMPHOCYTES ABSOLUTE: 3.1 K/UL (ref 1–5.1)
LYMPHOCYTES RELATIVE PERCENT: 36.8 %
MAGNESIUM: 2.5 MG/DL (ref 1.8–2.4)
MCH RBC QN AUTO: 29.8 PG (ref 26–34)
MCHC RBC AUTO-ENTMCNC: 34.6 G/DL (ref 31–36)
MCV RBC AUTO: 86.1 FL (ref 80–100)
MONOCYTES ABSOLUTE: 0.4 K/UL (ref 0–1.3)
MONOCYTES RELATIVE PERCENT: 4.6 %
NEUTROPHILS ABSOLUTE: 4.6 K/UL (ref 1.7–7.7)
NEUTROPHILS RELATIVE PERCENT: 54 %
ORGANISM: ABNORMAL
PDW BLD-RTO: 14.7 % (ref 12.4–15.4)
PLATELET # BLD: 291 K/UL (ref 135–450)
PMV BLD AUTO: 7.9 FL (ref 5–10.5)
POTASSIUM REFLEX MAGNESIUM: 3.4 MMOL/L (ref 3.5–5.1)
RBC # BLD: 5.15 M/UL (ref 4–5.2)
SODIUM BLD-SCNC: 138 MMOL/L (ref 136–145)
TOTAL PROTEIN: 7.3 G/DL (ref 6.4–8.2)
URINE CULTURE, ROUTINE: ABNORMAL
WBC # BLD: 8.4 K/UL (ref 4–11)

## 2021-12-14 PROCEDURE — 80053 COMPREHEN METABOLIC PANEL: CPT

## 2021-12-14 PROCEDURE — 83735 ASSAY OF MAGNESIUM: CPT

## 2021-12-14 PROCEDURE — 99283 EMERGENCY DEPT VISIT LOW MDM: CPT

## 2021-12-14 PROCEDURE — 87186 SC STD MICRODIL/AGAR DIL: CPT

## 2021-12-14 PROCEDURE — 93005 ELECTROCARDIOGRAM TRACING: CPT | Performed by: EMERGENCY MEDICINE

## 2021-12-14 PROCEDURE — 87086 URINE CULTURE/COLONY COUNT: CPT

## 2021-12-14 PROCEDURE — 85025 COMPLETE CBC W/AUTO DIFF WBC: CPT

## 2021-12-14 PROCEDURE — 83605 ASSAY OF LACTIC ACID: CPT

## 2021-12-14 PROCEDURE — 87088 URINE BACTERIA CULTURE: CPT

## 2021-12-14 ASSESSMENT — PAIN - FUNCTIONAL ASSESSMENT: PAIN_FUNCTIONAL_ASSESSMENT: ACTIVITIES ARE NOT PREVENTED

## 2021-12-14 ASSESSMENT — PAIN SCALES - GENERAL: PAINLEVEL_OUTOF10: 9

## 2021-12-14 ASSESSMENT — PAIN DESCRIPTION - PAIN TYPE: TYPE: ACUTE PAIN

## 2021-12-14 ASSESSMENT — PAIN DESCRIPTION - ONSET: ONSET: ON-GOING

## 2021-12-14 ASSESSMENT — PAIN DESCRIPTION - FREQUENCY: FREQUENCY: CONTINUOUS

## 2021-12-14 ASSESSMENT — PAIN DESCRIPTION - LOCATION: LOCATION: BACK

## 2021-12-14 ASSESSMENT — PAIN DESCRIPTION - PROGRESSION: CLINICAL_PROGRESSION: NOT CHANGED

## 2021-12-14 ASSESSMENT — PAIN DESCRIPTION - DESCRIPTORS: DESCRIPTORS: DISCOMFORT

## 2021-12-15 ENCOUNTER — HOSPITAL ENCOUNTER (EMERGENCY)
Age: 41
Discharge: HOME | End: 2021-12-15
Attending: EMERGENCY MEDICINE
Payer: COMMERCIAL

## 2021-12-15 VITALS
WEIGHT: 155.42 LBS | RESPIRATION RATE: 18 BRPM | SYSTOLIC BLOOD PRESSURE: 128 MMHG | BODY MASS INDEX: 31.39 KG/M2 | OXYGEN SATURATION: 99 % | TEMPERATURE: 98.6 F | HEART RATE: 102 BPM | DIASTOLIC BLOOD PRESSURE: 88 MMHG

## 2021-12-15 DIAGNOSIS — N30.01 ACUTE CYSTITIS WITH HEMATURIA: Primary | ICD-10-CM

## 2021-12-15 LAB
BILIRUBIN URINE: NEGATIVE
BLOOD, URINE: ABNORMAL
CLARITY: ABNORMAL
COLOR: YELLOW
CRYSTALS, UA: ABNORMAL /HPF
CRYSTALS, UA: ABNORMAL /HPF
EKG ATRIAL RATE: 103 BPM
EKG DIAGNOSIS: NORMAL
EKG P AXIS: 61 DEGREES
EKG P-R INTERVAL: 126 MS
EKG Q-T INTERVAL: 344 MS
EKG QRS DURATION: 76 MS
EKG QTC CALCULATION (BAZETT): 450 MS
EKG R AXIS: 11 DEGREES
EKG T AXIS: 52 DEGREES
EKG VENTRICULAR RATE: 103 BPM
EPITHELIAL CELLS, UA: 6 /HPF (ref 0–5)
GLUCOSE URINE: NEGATIVE MG/DL
HYALINE CASTS: 12 /LPF (ref 0–8)
KETONES, URINE: NEGATIVE MG/DL
LEUKOCYTE ESTERASE, URINE: ABNORMAL
MICROSCOPIC EXAMINATION: YES
NITRITE, URINE: NEGATIVE
PH UA: 6 (ref 5–8)
PROTEIN UA: 30 MG/DL
RBC UA: 52 /HPF (ref 0–4)
SPECIFIC GRAVITY UA: 1.02 (ref 1–1.03)
URINE REFLEX TO CULTURE: YES
URINE TYPE: ABNORMAL
UROBILINOGEN, URINE: 1 E.U./DL
WBC UA: 48 /HPF (ref 0–5)

## 2021-12-15 PROCEDURE — 81001 URINALYSIS AUTO W/SCOPE: CPT

## 2021-12-15 PROCEDURE — 93010 ELECTROCARDIOGRAM REPORT: CPT | Performed by: INTERNAL MEDICINE

## 2021-12-15 PROCEDURE — 6370000000 HC RX 637 (ALT 250 FOR IP): Performed by: EMERGENCY MEDICINE

## 2021-12-15 RX ORDER — CIPROFLOXACIN 500 MG/1
500 TABLET, FILM COATED ORAL 2 TIMES DAILY
Qty: 14 TABLET | Refills: 0 | Status: SHIPPED | OUTPATIENT
Start: 2021-12-15 | End: 2021-12-22

## 2021-12-15 RX ORDER — 0.9 % SODIUM CHLORIDE 0.9 %
1000 INTRAVENOUS SOLUTION INTRAVENOUS ONCE
Status: DISCONTINUED | OUTPATIENT
Start: 2021-12-15 | End: 2021-12-15 | Stop reason: HOSPADM

## 2021-12-15 RX ORDER — ONDANSETRON 4 MG/1
4 TABLET, ORALLY DISINTEGRATING ORAL ONCE
Status: COMPLETED | OUTPATIENT
Start: 2021-12-15 | End: 2021-12-15

## 2021-12-15 RX ADMIN — ONDANSETRON 4 MG: 4 TABLET, ORALLY DISINTEGRATING ORAL at 03:19

## 2021-12-15 ASSESSMENT — ENCOUNTER SYMPTOMS
ABDOMINAL PAIN: 0
ABDOMINAL PAIN: 1
CONSTIPATION: 0
VOMITING: 0
COLOR CHANGE: 0
SHORTNESS OF BREATH: 0
COUGH: 0
EYE ITCHING: 0
EYE DISCHARGE: 0

## 2021-12-15 ASSESSMENT — PAIN SCALES - GENERAL: PAINLEVEL_OUTOF10: 2

## 2021-12-15 ASSESSMENT — PAIN - FUNCTIONAL ASSESSMENT: PAIN_FUNCTIONAL_ASSESSMENT: 0-10

## 2021-12-15 NOTE — ED PROVIDER NOTES
[address] [facility]      Pt Name: Crystal Hensley  MRN: [id number]  [place] 1980  Date of evaluation: 12/14/2021  Provider: Eulalio Duckworth MD    CHIEF COMPLAINT       Chief Complaint   Patient presents with    Emesis     unable to keep abx down/ MD concerned for sepsis / had recent curgery for kidney stones, urinary catheter in place    Fever       Noelle Leach is a 46 y.o. female who presents to the emergency department with UTI. Positive for dysuria, frequency, urgency. Lower abdominal pain. 3-10 achy nature. Multiple CTs in the past.  No other associated symptoms. Nursing Notes were reviewed. Including nursing noted for FM, Surgical History, Past Medical History, Social History, vitals, and allergies; agree with all. REVIEW OF SYSTEMS       Review of Systems   Constitutional: Negative for diaphoresis and unexpected weight change. HENT: Negative for congestion and dental problem. Eyes: Negative for discharge and itching. Respiratory: Negative for cough and shortness of breath. Cardiovascular: Negative for chest pain and leg swelling. Gastrointestinal: Positive for abdominal pain. Negative for constipation and vomiting. Endocrine: Negative for cold intolerance and heat intolerance. Genitourinary: Positive for dysuria and frequency. Negative for vaginal bleeding, vaginal discharge and vaginal pain. Musculoskeletal: Negative for neck pain and neck stiffness. Skin: Negative for color change and pallor. Neurological: Negative for tremors and weakness. Psychiatric/Behavioral: Negative for agitation and behavioral problems. Except as noted above the remainder of the review of systems was reviewed and negative.      PAST MEDICAL HISTORY     Past Medical History:   Diagnosis Date    ADHD (attention deficit hyperactivity disorder) 82    Depression with anxiety     Diabetes mellitus [facility])     pre-diabetes    Difficult intubation     Encounter for imaging to screen for metal prior to MRI 2021    MRI Conditional Medtronic Non-Programmable shunt model#76142 implanted 10/30/2020 at [facility]. Normal Mode. 1.5T or 3.0T.    ESBL (extended spectrum beta-lactamase) producing bacteria infection 2019    urine    Functional ovarian cysts 2008    rt ovary cyst x 2 yrs.     Headache(784.0)     migraines    [facility] of [facility]     [facility]   [facility]     History of PCOS     Hydrocephalus ([address]     Hyperlipidemia     Irritable bowel syndrome 2004    had colonoscopy about 6 yrs ago    Meningitis     Neutrophilic leukocytosis     Nicotine dependence     PONV (postoperative nausea and vomiting)     very nauseated and sometimes wakes up with a Migraine--happened once after brain surgery    Primary osteoarthritis of left knee 2016    S/P cone biopsy of cervix 2004    Scoliosis .s     (ventriculoperitoneal) shunt status     hydrocephalus f/[facility] Neurosurgeon [facility]     (ventriculoperitoneal) shunt status     Wears glasses     reading       SURGICAL HISTORY       Past Surgical History:   Procedure Laterality Date    ABDOMEN SURGERY N/A 2021    REMOVAL OF ABDOMINAL WALL MASS performed by Kylee Limon MD at [address]      appendicitis     SECTION      placenta previa    CHOLECYSTECTOMY      COLONOSCOPY  2004    COLPOSCOPY      CSF SHUNT      replaced at age 17   [facility]      stone removal    HEMORRHOID SURGERY      HERNIA REPAIR Bilateral     inguinal    HERNIA REPAIR  2015    hiatal hernia    HYSTERECTOMY, TOTAL ABDOMINAL  2016    TAHBSO, adhesions/PCOS    KNEE ARTHROSCOPY Left 2015    medial meniscectomy, chondroplasty, plica resection    LITHOTRIPSY Bilateral 12/10/2019    OTHER SURGICAL HISTORY  10/19/2016    op lap    VENTRICULOPERITONEAL SHUNT      multiple revisions, most recent 2015       CURRENT MEDICATIONS       Discharge Medication List as of 12/15/2021  3:41 AM      CONTINUE these medications which have NOT CHANGED    Details   nitrofurantoin, macrocrystal-monohydrate, (MACROBID) 100 MG capsule Take 1 capsule by mouth 2 times daily for 7 days, Disp-14 capsule, R-0Print      promethazine (PHENERGAN) 12.5 MG tablet Take 1 tablet by mouth every 8 hours as needed for Nausea WARNING:  May cause drowsiness. May impair ability to operate vehicles or machinery. Do not use in combination with alcohol., Disp-10 tablet, R-0Print      VYVANSE 50 MG capsule Take 1 capsule by mouth every morning for 30 days. Take 50 mg by mouth every morning., Disp-30 capsule, R-0, DAWNormal      oxyCODONE-acetaminophen (PERCOCET) 5-325 MG per tablet Historical Med      predniSONE (DELTASONE) 10 MG tablet Take 10 mg by mouth See Admin InstructionsHistorical Med      triamcinolone (KENALOG) 0.1 % ointment Historical Med      traMADol (ULTRAM) 50 MG tablet Take 50 mg by mouth every 6 hours as needed for Pain. Historical Med      scopolamine (TRANSDERM-SCOP) transdermal patch Place 1 patch onto the skin every 72 hours, Disp-30 patch, R-0Normal      simvastatin (ZOCOR) 10 MG tablet Take 1 tablet by mouth nightly, Disp-90 tablet, R-1Normal             ALLERGIES     Bee venom, Bentyl [dicyclomine hcl], Dicyclomine, Ketorolac tromethamine, [place]aitake, Mushroom extract complex, Oxycodone-acetaminophen, Sulfa antibiotics, Vancomycin, Adhesive tape, Dicyclomine hcl, Haldol [haloperidol], Hydrocodone, Silicone, Sulfacetamide, Acetaminophen, Butalbital-apap-caff-cod, Ceftaroline, Daptomycin, Ketamine, Levaquin [levofloxacin], Methocarbamol, Prochlorperazine, Reglan [metoclopramide], Tazobactam, and Zosyn [piperacillin sod-tazobactam so]    FAMILY HISTORY        Family History   Problem Relation Age of Onset    High Blood Pressure Mother     Diabetes Mother     High Cholesterol Mother  Depression Mother     Diabetes Maternal Grandmother     High Blood Pressure Maternal Grandmother     High Cholesterol Maternal Grandmother     Heart Disease Maternal Grandfather     High Blood Pressure Maternal Grandfather     Heart Disease Paternal Grandmother     Stroke Paternal Grandfather     Depression Sister     Cirrhosis Father     Rheum Arthritis Neg Hx     Osteoarthritis Neg Hx     Asthma Neg Hx     Breast Cancer Neg Hx     Cancer Neg Hx     Heart Failure Neg Hx     Hypertension Neg Hx     Migraines Neg Hx     Ovarian Cancer Neg Hx     Rashes/Skin Problems Neg Hx     Seizures Neg Hx     Thyroid Disease Neg Hx        SOCIAL HISTORY       Social History     Socioeconomic History    Marital status: Single     Spouse name: Not on file    Number of children: Not on file    Years of education: Not on file    Highest education level: Not on file   Occupational History    Occupation: disability, SSI    Tobacco Use    Smoking status: Current Every Day Smoker     Packs/day: 0.50     Years: 18.00     Pack years: 9.00     Types: Cigarettes    Smokeless tobacco: Never Used   Vaping Use    Vaping Use: Never used   Substance and Sexual Activity    Alcohol use: No     Alcohol/week: 0.0 standard drinks    Drug use: Never    Sexual activity: Not Currently     Partners: Male   Other Topics Concern    Not on file   Social History Narrative    Not on file     Social Determinants of Health     Financial Resource Strain: Low Risk     Difficulty of Paying Living Expenses: Not hard at all   Food Insecurity: No Food Insecurity    Worried About Running Out of Food in the Last Year: Never true    [place] of Food in the Last Year: Never true   Transportation Needs:     Lack of Transportation (Medical): Not on file    Lack of Transportation (Non-Medical):  Not on file   Physical Activity:     Days of Exercise per Week: Not on file    Minutes of Exercise per Session: Not on file   Stress:     Feeling of Stress : Not on file   Social Connections:     Frequency of Communication with Friends and Family: Not on file    Frequency of Social Gatherings with Friends and Family: Not on file    Attends Cheondoism Services: Not on file    Active Member of Clubs or Organizations: Not on file    Attends Club or Organization Meetings: Not on file    Marital Status: Not on file   Intimate Partner Violence:     Fear of Current or Ex-Partner: Not on file    Emotionally Abused: Not on file    Physically Abused: Not on file    Sexually Abused: Not on file   Housing Stability:     Unable to Pay for Housing in the Last Year: Not on file    Number of [place] in the Last Year: Not on file    Unstable Housing in the Last Year: Not on file       PHYSICAL EXAM       ED Triage Vitals [12/14/21 2031]   BP Temp Temp Source Pulse Resp SpO2 Height Weight   (!) 140/96 98.1 °F (36.7 °C) Temporal 117 16 99 % -- 155 lb 6.8 oz (70.5 kg)       Physical Exam  Vitals and nursing note reviewed. Constitutional:       General: She is not in acute distress. Appearance: She is well-developed. She is not ill-appearing, toxic-appearing or diaphoretic. HENT:      Head: Normocephalic and atraumatic. Right Ear: External ear normal.      Left Ear: External ear normal.   Eyes:      General:         Right eye: No discharge. Left eye: No discharge. Conjunctiva/sclera: Conjunctivae normal.      Pupils: Pupils are equal, round, and reactive to light. Cardiovascular:      Rate and Rhythm: Regular rhythm. Tachycardia present. Heart sounds: No murmur heard. Pulmonary:      Effort: Pulmonary effort is normal. No respiratory distress. Breath sounds: Normal breath sounds. No wheezing or rales. Abdominal:      General: Bowel sounds are normal. There is no distension. Palpations: Abdomen is soft. There is no mass. Tenderness: There is no abdominal tenderness. There is no guarding or rebound. Genitourinary:     Comments: Deferred  Musculoskeletal:         General: No deformity. Normal range of motion. Cervical back: Normal range of motion and neck supple. Skin:     General: Skin is warm. Findings: No erythema or rash. Neurological:      Mental Status: She is alert and oriented to person, place, and time. She is not disoriented. Cranial Nerves: No cranial nerve deficit. Motor: No atrophy or abnormal muscle tone. Coordination: Coordination normal.   Psychiatric:         Behavior: Behavior normal.         Thought Content:  Thought content normal.         DIAGNOSTIC RESULTS     EKG: All EKG's are interpreted by the Emergency Department Physician who either signs or Co-signs this chart in the absence of acardiologist.    None    RADIOLOGY:   Non-plain film images such as CT, Ultrasoundand MRI are read by the radiologist. Plain radiographic images are visualized and preliminarily interpreted by the emergency physician with the below findings:    REFUSED    ED BEDSIDE ULTRASOUND:   Performed by ED Physician - none    LABS:  Labs Reviewed   COMPREHENSIVE METABOLIC PANEL W/ REFLEX TO MG FOR LOW K - Abnormal; Notable for the following components:       Result Value    Potassium reflex Magnesium 3.4 (*)     Glucose 113 (*)     CREATININE 0.5 (*)     Alkaline Phosphatase 220 (*)      (*)     AST 40 (*)     All other components within normal limits    Narrative:     Performed at:  [address] [address]   Phone [phone]   URINE RT REFLEX TO CULTURE - Abnormal; Notable for the following components:    Clarity, UA CLOUDY (*)     Blood, Urine MODERATE (*)     Protein, UA 30 (*)     Leukocyte Esterase, Urine SMALL (*)     All other components within normal limits    Narrative:     Performed at:  [facility]  [address] [facility] [address]   Phone [phone] there is LOW risk for Sepsis, MI, Stroke, Tamponade, PTX, Toxicity or other life threatening etiology thus I consider the discharge disposition reasonable. The patient is at low risk for mortality based on demographic, history and clinical factors. Given the best available information and clinical assessment, I estimate the risk of hospitalization to be greater than risk of treatment at home. I have explained to the patient that the risk could rapidly change, given precautions for return and instructions. Explained to patient that the risk for mortality is low based on demographic, history and clinical factors. I discussed with patient the results of evaluation in the ED, diagnosis, care, and prognosis. The plan is to discharge to home. Patient is in agreement with plan and questions have been answered. I also discussed with patient the reasons which may require a return visit and the importance of follow-up care. The patient is well-appearing, nontoxic, and improved at the time of discharge. Patient agrees to call to arrange follow-up care as directed. Patient understands to return immediately for worsening/change in symptoms. CRITICAL CARE TIME   Total Critical Caretime was 21 minutes, excluding separately reportable procedures. There was a high probability of clinically significant/life threatening deterioration in the patient's condition which required my urgent intervention. PROCEDURES:  Unlessotherwise noted below, none    FINAL IMPRESSION      1.  Acute cystitis with hematuria          DISPOSITION/PLAN   DISPOSITION Decision To Discharge 12/15/2021 03:35:37 AM    PATIENT REFERRED TO:  [address]  [address] [facility]  [phone]            DISCHARGE MEDICATIONS:  Discharge Medication List as of 12/15/2021  3:41 AM      START taking these medications    Details   ciprofloxacin (CIPRO) 500 MG tablet Take 1 tablet by mouth 2 times daily for 7 days, Disp-14 tablet, R-0Print                (Please note that portions ofthis note were completed with a voice recognition program.  Efforts were made to edit the dictations but occasionally words are mis-transcribed.)    Max Do MD(electronically signed)  Attending Emergency Physician            Max Do MD  12/15/21 7338

## 2021-12-16 NOTE — ED NOTES
Outreach call was made per referral list sent by [facility] 6pm 12/14. No reason for referral was provided. First personal encounter w pt was on 12/12. Gave pt information on opioid and narcotics. Pt presented as low risk. F/U call on 12/14 pt complained on sepsis. pcp visit earlier 12/14 per pt pcp requested pt visit ER ASAP. Advised pt per RN in pod that sepsis is serious condition and should come in immediately w/o hesitation. She is a frequent flyer to all area ERs and knows providers by name. Asked pt how I could provide resources to her: Pt complains of allergy to shunt in brain. Rash on neck. Would like new neuro and allergist consult not w Heber SALGUERO, or Zane. Will research and F/U w pt.

## 2021-12-17 LAB
ORGANISM: ABNORMAL
URINE CULTURE, ROUTINE: ABNORMAL

## 2021-12-22 ENCOUNTER — TELEPHONE (OUTPATIENT)
Dept: PRIMARY CARE CLINIC | Age: 41
End: 2021-12-22

## 2021-12-23 NOTE — TELEPHONE ENCOUNTER
Pt called on call and stated that she removed her catheter today and this evening she was having a bowel movement this evening and felt a gush and then wiped and it was very heavy with blood. She looked in the toilet and it was filled with blood and a large blood clot. Some normal, soft stools mixed in. She does have some abdominal pain, but was just in ER yesterday for kidney infection so unsure if from that or this. Denies fever, chills, sweats, vomiting. Reviewed CT scan from ER visit yesterday and showed mild diverticulosis. Asked pt if she urinated at the same time when blood in toliet, and she states she was thinking it was from her rectum, but maybe she urinated as well. Has not notified her Urologist as of yet. Discussed calling and notiying since just took out catheter. Also stated that with diverticulosis noted on CT scan would recommend going to ER to be evlauated for diverticulitis. She states that she is fed up with being sick and going to the ERs. She is going to wait how she feels and go tomorrow but she will call her urologist now to see what they say. If it happens again (blood in toilet) she states she will go to ER sooner. Discussed symptoms to monitor for and seek care if experiencing. Discussed importance of hydration.

## 2021-12-31 ENCOUNTER — PATIENT MESSAGE (OUTPATIENT)
Dept: PRIMARY CARE CLINIC | Age: 41
End: 2021-12-31

## 2021-12-31 DIAGNOSIS — F90.9 ATTENTION DEFICIT HYPERACTIVITY DISORDER (ADHD), UNSPECIFIED ADHD TYPE: ICD-10-CM

## 2022-01-03 RX ORDER — LISDEXAMFETAMINE DIMESYLATE 50 MG
50 CAPSULE ORAL EVERY MORNING
Qty: 30 CAPSULE | Refills: 0 | Status: SHIPPED | OUTPATIENT
Start: 2022-01-03 | End: 2022-01-31 | Stop reason: SDUPTHER

## 2022-01-03 NOTE — TELEPHONE ENCOUNTER
From: Amalia Licona  To: Vira Christensen  Sent: 12/31/2021 3:15 PM EST  Subject: Refill     I called And left a message for you. I would Of called sooner I forgot. I need A refill on my vyvanse. Im not sure if [employer] [place] [facility] [place] [facility] is open tomorrow and I took My last vyvanse today. I just Wanted to let you know.  Thank you in advance

## 2022-01-07 ENCOUNTER — HOSPITAL ENCOUNTER (OUTPATIENT)
Dept: INTERVENTIONAL RADIOLOGY/VASCULAR | Age: 42
Discharge: HOME | End: 2022-01-07
Payer: COMMERCIAL

## 2022-01-07 ENCOUNTER — HOSPITAL ENCOUNTER (OUTPATIENT)
Age: 42
Discharge: HOME | End: 2022-01-07
Payer: COMMERCIAL

## 2022-01-07 PROCEDURE — 36569 INSJ PICC 5 YR+ W/O IMAGING: CPT

## 2022-01-07 PROCEDURE — C1751 CATH, INF, PER/CENT/MIDLINE: HCPCS

## 2022-01-07 PROCEDURE — 76937 US GUIDE VASCULAR ACCESS: CPT

## 2022-01-07 NOTE — PROCEDURES
OUTPT PICC PLACEMENT:    Preprocedure & timeout done w primary RN Alyssa De La Torre; +PICC Order verified; Informed Consent obtained by me at bedside prior to procedure; questions and concerns re procedure & home care discussed; no difficulty accessing R BASILIC vein; picc tip is verified using HipChat 3cg Tip Confirmation System with positive pwave and no negative deflection visualized at 0cm out, confirming optimal tip positioning; waveform attached below; Order fo OK to Use is placed in EMR based on the 3cg tip confirmation standard of care; pt tolerated procedure fairly well, despite her level of anxiety before, during and after procedure; pt is escorted to the  to go home w PICC in place for home therapy as ordered; she is ambulatory with steady gait.     JERRI Venegas RN, BSN, [facility].

## 2022-01-25 ENCOUNTER — OFFICE VISIT (OUTPATIENT)
Dept: ORTHOPEDIC SURGERY | Age: 42
End: 2022-01-25
Payer: COMMERCIAL

## 2022-01-25 VITALS — WEIGHT: 158.2 LBS | BODY MASS INDEX: 31.89 KG/M2 | HEIGHT: 59 IN

## 2022-01-25 DIAGNOSIS — S43.402A SPRAIN OF LEFT SHOULDER, UNSPECIFIED SHOULDER SPRAIN TYPE, INITIAL ENCOUNTER: Primary | ICD-10-CM

## 2022-01-25 DIAGNOSIS — M25.512 ACUTE PAIN OF LEFT SHOULDER: ICD-10-CM

## 2022-01-25 PROCEDURE — G8484 FLU IMMUNIZE NO ADMIN: HCPCS | Performed by: ORTHOPAEDIC SURGERY

## 2022-01-25 PROCEDURE — 99243 OFF/OP CNSLTJ NEW/EST LOW 30: CPT | Performed by: ORTHOPAEDIC SURGERY

## 2022-01-25 PROCEDURE — G8417 CALC BMI ABV UP PARAM F/U: HCPCS | Performed by: ORTHOPAEDIC SURGERY

## 2022-01-25 PROCEDURE — G8427 DOCREV CUR MEDS BY ELIG CLIN: HCPCS | Performed by: ORTHOPAEDIC SURGERY

## 2022-01-25 NOTE — PROGRESS NOTES
Petrona Pemberton  <>  January 25, 2022    Chief Complaint   Patient presents with    Shoulder Pain     left       History: The patient is a 21-year-old female who is here for evaluation of her left shoulder. The patient reportedly fell down some stairs 3 months ago and she injured her left shoulder. She is right-hand dominant. The patient did go to [facility] and she was evaluated. She is currently in pain management at [facility].  She has difficulty raising her arm. She has no prior history of left shoulder issues. I did perform a left knee arthroscopy in the remote past and she healed uneventfully. The patient does have an extensive medical history including history of congenital hydrocephalus. The patient does have a  shunt. She did have to have a recent surgery down in [place] for the shunt. She temporarily moved to [place]. She denies any numbness or tingling. This is a consult from FREDRICK Uribe CNP for left shoulder pain. The patient's  past medical history, medications, allergies,  family history, social history, and have been reviewed, and dated and are recorded in the chart. Pertinent items are noted in HPI. Review of systems reviewed from Pertinent History Form dated on 1/25/22 and available in the patient's chart under the Media tab. Vitals:  Ht 4' 11\" (1.499 m)   Wt 158 lb 3.2 oz (71.8 kg)   LMP 10/31/2016 (Exact Date)   BMI 31.95 kg/m²     Physical: Physical: The patient presents today in no acute distress. She is alert and oriented to person, place and time. She displays appropriate mood and affect. She is well dressed, nourished and  groomed. She has a normal affect. Examination of the neck reveals no evidence of tenderness. Spurling's sign is negative. Active range of motion of the left shoulder is: 135 degrees abduction, 135 degrees forward flexion, 35 degrees of external rotation and internal rotation to L4.  Passive range of motion of the left shoulder is: 150 degrees abduction, 150 degrees forward flexion, 50 degrees of external rotation and internal rotation to L3. Active and passive range of motion of the opposite shoulder is full. Examination of the left shoulder reveals positive Neer and Green' impingement signs. There is mild subacromial crepitus with range of motion. Drop arm test is positive on the left. Speed's test is negative. There is no evidence of tenderness over the Bicipital groove. She  does have tenderness over the [facility] joint. Cross body adduction test is positive. She has moderate tenderness over the subacromial space. There is no evidence of scapular winging. Lift off sign is negative. There is no evidence of atrophy. External rotation strength is 3+/5 on the left. There are no skin lesions, cellulitis, or extreme edema in the upper extremities. Sensation is intact to axillary, median, radial, and ulnar nerves bilaterally. The patient has warm and well-perfused bilateral upper extremities with brisk capillary refill. Radial and ulnar pulses are palpable and 2+ bilaterally. X-rays: 4 views of the left shoulder obtained in the office today were extensively reviewed. There is mild to moderate AC joint arthritis. There is a subtle hooking to the acromion. There are no lytic or blastic lesions within the bone. Impression: Left shoulder rotator cuff tear    Plan: At this time, we will obtain an MRI scan of the left shoulder considering her weakness and pain. She is now 3 months post injury and continues to have severe weakness. She will follow-up with me after the MRI scan is completed. She was encouraged to modify her activities.

## 2022-01-26 ENCOUNTER — TELEPHONE (OUTPATIENT)
Dept: ORTHOPEDIC SURGERY | Age: 42
End: 2022-01-26

## 2022-01-26 NOTE — TELEPHONE ENCOUNTER
I spoke with patient and told her the MRI Left shoulder was approved to be done at [facility]

## 2022-01-31 DIAGNOSIS — F90.9 ATTENTION DEFICIT HYPERACTIVITY DISORDER (ADHD), UNSPECIFIED ADHD TYPE: ICD-10-CM

## 2022-01-31 RX ORDER — LISDEXAMFETAMINE DIMESYLATE 50 MG
50 CAPSULE ORAL EVERY MORNING
Qty: 30 CAPSULE | Refills: 0 | Status: SHIPPED | OUTPATIENT
Start: 2022-02-01 | End: 2022-03-03 | Stop reason: SDUPTHER

## 2022-02-01 ENCOUNTER — TELEPHONE (OUTPATIENT)
Dept: ORTHOPEDIC SURGERY | Age: 42
End: 2022-02-01

## 2022-02-01 DIAGNOSIS — F40.240 CLAUSTROPHOBIA: Primary | ICD-10-CM

## 2022-02-01 NOTE — TELEPHONE ENCOUNTER
The patient states Rock Stephenson has the information needed for her MRI. She is scheduled for 2/7/22. She is requesting something to take prior to the MRI for claustrophobia. She is aware this will be addressed tomorrow.

## 2022-02-01 NOTE — TELEPHONE ENCOUNTER
General Question     Subject: PATIENT STATES SHE IS HAVING A DIFFICULT TIME GETTING THE MRI DONE AS SHE HAS A SHUNT IN HER HEAD. [facility] IS REACHING OUT TO THE [place] DOCTOR FOR THE SHUNT INFORMATION, AND PATIENT IS WANTING TO KNOW IF SHE SHOULD GO SOMEWHERE ELSE OR WHETHER SHE SHOULD JUST WAIT. PLEASE ADVISE.     Patient:  Shell Norris  Contact Number: [phone]

## 2022-02-02 RX ORDER — DIAZEPAM 10 MG/1
10 TABLET ORAL SEE ADMIN INSTRUCTIONS
Qty: 1 TABLET | Refills: 0 | OUTPATIENT
Start: 2022-02-02 | End: 2022-02-03

## 2022-02-03 NOTE — TELEPHONE ENCOUNTER
I advised the patient to go to the ER due to the pain and her balance. This is getting worse from before. No antidepressants or therapy currently and she if feeling well.   Using coping skills.

## 2022-02-04 ENCOUNTER — TELEPHONE (OUTPATIENT)
Dept: PRIMARY CARE CLINIC | Age: 42
End: 2022-02-04

## 2022-02-04 ENCOUNTER — TELEPHONE (OUTPATIENT)
Dept: ORTHOPEDIC SURGERY | Age: 42
End: 2022-02-04

## 2022-02-09 ENCOUNTER — OFFICE VISIT (OUTPATIENT)
Dept: ORTHOPEDIC SURGERY | Age: 42
End: 2022-02-09
Payer: COMMERCIAL

## 2022-02-09 VITALS — WEIGHT: 158 LBS | BODY MASS INDEX: 31.85 KG/M2 | HEIGHT: 59 IN

## 2022-02-09 DIAGNOSIS — M75.82 ROTATOR CUFF TENDONITIS, LEFT: Primary | ICD-10-CM

## 2022-02-09 PROCEDURE — G8484 FLU IMMUNIZE NO ADMIN: HCPCS | Performed by: ORTHOPAEDIC SURGERY

## 2022-02-09 PROCEDURE — G8417 CALC BMI ABV UP PARAM F/U: HCPCS | Performed by: ORTHOPAEDIC SURGERY

## 2022-02-09 PROCEDURE — G8428 CUR MEDS NOT DOCUMENT: HCPCS | Performed by: ORTHOPAEDIC SURGERY

## 2022-02-09 PROCEDURE — 4004F PT TOBACCO SCREEN RCVD TLK: CPT | Performed by: ORTHOPAEDIC SURGERY

## 2022-02-09 PROCEDURE — 99214 OFFICE O/P EST MOD 30 MIN: CPT | Performed by: ORTHOPAEDIC SURGERY

## 2022-02-09 RX ORDER — METHYLPREDNISOLONE 4 MG/1
TABLET ORAL
Qty: 1 KIT | Refills: 0 | Status: SHIPPED | OUTPATIENT
Start: 2022-02-09 | End: 2022-02-15

## 2022-02-14 ENCOUNTER — TELEPHONE (OUTPATIENT)
Dept: ORTHOPEDIC SURGERY | Age: 42
End: 2022-02-14

## 2022-02-14 NOTE — TELEPHONE ENCOUNTER
I spoke to pt. Her entire Left arm is numb and tingling, since Friday, also affecting her thumb and index finger. She dropped a dish taking it out of the microwave yesterday, because her arm was numb. I let pt know we will ask Dr Gorge Camarillo to advise when he is back in office tomorrow.

## 2022-02-14 NOTE — TELEPHONE ENCOUNTER
General Question     Subject: Patient stated she is having some tingling in her shoulder and she need to have someone call her back. Please Advise.   Patient: Sherri Lopez  Contact Number: [phone]

## 2022-02-15 NOTE — PLAN OF CARE
[facility]. [address]  Phone: [phone]   Fax:     [phone]                                                       Physical Therapy Certification    Dear Referring Practitioner: Inez Brooks MD,    We had the pleasure of evaluating the following patient for physical therapy services at [facility]. A summary of our findings can be found in the initial assessment below. This includes our plan of care. If you have any questions or concerns regarding these findings, please do not hesitate to contact me at the office phone number checked above. Thank you for the referral.       Physician Signature:_______________________________Date:__________________  By signing above (or electronic signature), therapists plan is approved by physician          Patient: Sammy Carnes   : 1980   MRN: [id number]  Referring Physician: Referring Practitioner: Inez Brooks MD      Evaluation Date: 2/15/2022      Medical Diagnosis Information:  Diagnosis: M75.82 (ICD-10-CM) - Rotator cuff tendonitis, left   Treatment Diagnosis: Decreased strength and mobility. Insurance information: PT Insurance Information: [employer]    Precautions/ Contra-indications: Pt has a  shunt on L side. Latex Allergy:   [x]  NO      []YES  Preferred Language for Healthcare:   [x]English       []other:    C-SSRS Triggered by Intake questionnaire (Past 2 wk assessment ):   [x] No, Questionnaire did not trigger screening.   [] Yes, Patient intake triggered C-SSRS Screening      [] C-SSRS Screening completed  [] PCP notified via Epic     SUBJECTIVE: Patient reports L shoulder pain that began 4 months ago secondary to a fall down the stairs. Xray results were negative for a fracture.  MRI results indicated tendinopathy of RTC musculature. She also reports L hand pain like her hand is falling asleep. She reports NT from her elbow down to her hand. Most shoulder pain is located in deltoid insertion and along joint line. Pt reports immediate bruising after the injury. Pt denies neck pain. She reports pain on posterior-superior shoulder blade. Pt reports some popping/clicking that feels deep. She states she is at increased risk of infections secondary to the  shunt. She has not had an injection to the L shoulder secondary to increased risk of infections. Pt was given sling, however it does not fit her properly so she has not been wearing it. She also states she is nervous and nauseous as she took her pain medication on an empty stomach. Relevant Medical History:Additional Pertinent Hx: HPL, DM, OA of L knee  Functional Outcomes Measure: Q-DASH =  54, disability/symptom score = 98%    Pain Scale:  9/10  Easing factors: ice, medication, guarded position of L UE. Provocative factors: Lifting OH, reaching, pressure (sleeping on stomach and back)    Type: [x]Constant   []Intermittent  []Radiating []Localized []other:     Numbness/Tingling: Pt reports NT in L UE from elbow to hand. Occupation/School: Disabled - she is a , however she is not currently working. Living Status/Prior Level of Function: Independent with ADLs and IADLs    OBJECTIVE:   Posture: guarded L UE, rounded shoulders    Functional Mobility/Transfers: Sit <> stand independent. Supine > sit requires UE assist and CGA.     Palpation: Gr III/IV TTP to R shoulder complex - UT, LS, RTC musculature, deltoid, triceps    Bandages/Dressings/Incisions: NA    Gait: WNL     CERV ROM     Cervical Flexion 50%    Cervical Extension 50%    Cervical SB 50% 50%   Cervical rotation 50% 50%        ROM Left Right   Shoulder Flex 62 deg, pain WNL   Shoulder Abd 55 deg, pain WNL   Shoulder ER 25 deg, pain WNL   Shoulder IR To stomach WNL             Strength  Left Right Shoulder Flex Unable to assess today secondary to high pain levels 4/5   Shoulder Scap - 4/5   Shoulder ER - 4/5   Shoulder IR - 4/5           Reflexes Normal Abnormal Comments   [x]ALL NORMAL            C5-6 Biceps [] []    C6 Brachioradialis [] []    C7-8 Triceps [] []      Reflexes/Sensation:    [x]Dermatomes/Myotomes intact    [x]Reflexes equal and normal bilaterally   []Other:    Joint mobility: L glenohumeral joint (unable to obtain true assessment due to high pain levels and guarding). []Normal    []Hypo   []Hyper    Orthopedic Special Tests:  unable to obtain true assessment due to high pain levels and guarding                       [x] Patient history, allergies, meds reviewed. Medical chart reviewed. See intake form. Review Of Systems (ROS):  [x]Performed Review of systems (Integumentary, CardioPulmonary, Neurological) by intake and observation. Intake form has been scanned into medical record. Patient has been instructed to contact their primary care physician regarding ROS issues if not already being addressed at this time.       Co-morbidities/Complexities (which will affect course of rehabilitation):   []None           Arthritic conditions   []Rheumatoid arthritis (M05.9)  [x]Osteoarthritis (M19.91)   Cardiovascular conditions   []Hypertension (I10)  [x]Hyperlipidemia (E78.5)  []Angina pectoris (I20)  []Atherosclerosis (I70)  []CVA Musculoskeletal conditions   []Disc pathology   []Congenital spine pathologies   []Prior surgical intervention  []Osteoporosis (M81.8)  []Osteopenia (M85.8)   Endocrine conditions   []Hypothyroid (E03.9)  []Hyperthyroid Gastrointestinal conditions   []Constipation (Y25.14)   Metabolic conditions   []Morbid obesity (E66.01)  [x]Diabetes type 1(E10.65) or 2 (E11.65)   []Neuropathy (G60.9)     Pulmonary conditions   []Asthma (J45)  []Coughing   []COPD (J44.9)   Psychological Disorders  [x]Anxiety (F41.9)  []Depression (F32.9)   []Other:   []Other:          Barriers to/and or personal factors that will affect rehab potential:              []Age  []Sex   []Smoker              []Motivation/Lack of Motivation                        [x]Co-Morbidities              []Cognitive Function, education/learning barriers              []Environmental, home barriers              []profession/work barriers  []past PT/medical experience  []other:  Justification:     Falls Risk Assessment (30 days):   [x] Falls Risk assessed and no intervention required. [] Falls Risk assessed and Patient requires intervention due to being higher risk   TUG score (>12s at risk):     [] Falls education provided, including         ASSESSMENT: Pt is a 47 yo female who presents to PT with L shoulder/UE pain. Pt displays poor tolerance to PT initial evaluation leading to limitations in the ability to obtain true assessment of L shoulder/UE pain. Upon IE, pt demosntrates guarded posture, high pain levels, decreased ROM, decreased functional mobility. Pt would benefit from PT 1-2x/week for 4-6 weeks in order to address the impairments listed.       Functional Impairments:     []Noted spinal or UE joint hypomobility   []Noted spinal or UE joint hypermobility   [x]Decreased spinal/UE functional ROM   []Abnormal reflexes/sensation/myotomal/dermatomal deficits   [x]Decreased RC/scapular/core strength and neuromuscular control    [x]Decreased UE functional strength   []other:      Functional Activity Limitations (from functional questionnaire and intake)   [x]Reduced ability to tolerate prolonged functional positions   [x]Reduced ability or difficulty with changes of positions or transfers between positions   [x]Reduced ability to maintain good posture and demonstrate good body mechanics with sitting, bending, and lifting   [x] Reduced ability or tolerance with driving and/or computer work   [x]Reduced ability to perform lifting, reaching, carrying tasks   [x]Reduced ability to reach behind back   [x]Reduced ability to sleep    [x]Reduced ability to tolerate any impact through UE or spine   [x]Reduced ability to  or hold objects   [x]Reduced ability to throw or toss an object   []other:    Participation Restrictions   [x]Reduced participation in self care activities   [x]Reduced participation in home management activities   [x]Reduced participation in work activities   [x]Reduced participation in social activities. [x]Reduced participation in sport/recreational activities. Classification/Subgrouping:   []signs/symptoms consistent with post-surgical status including decreased ROM, strength and function.     [x]signs/symptoms consistent with joint sprain/strain    []signs/symptoms consistent with shoulder impingement (internal, external, primary or secondary)   [x]signs/symptoms consistent with shoulder/elbow/wrist tendinopathy   []Signs/symptoms consistent with Rotator cuff tear   []sign/symptoms consistent with labral tear   []signs/symptoms consistent with rib dysfunction   []signs/symptoms consistent with postural dysfunction   []signs/symptoms consistent with Glenohumeral IR Deficit - <45 degrees   []signs/symptoms consistent with facet dysfunction of cervical/thoracic spine   []signs/symptoms consistent with pathology which may benefit from Dry Needling   []signs/symptoms which may limit the use of advanced manual therapy techniques: (Elevated CV risk profile, recent trauma, intolerance to end range positions, prior TIA, visual issues, UE neurological compromise )     Prognosis/Rehab Potential:      []Excellent   [x]Good    []Fair   []Poor    Tolerance of evaluation/treatment:    []Excellent   []Good    []Fair   [x]Poor    Physical Therapy Evaluation Complexity Justification  [x] A history of present problem with:  [] no personal factors and/or comorbidities that impact the plan of care;  []1-2 personal factors and/or comorbidities that impact the plan of care  [x]3 personal factors and/or comorbidities that impact the plan of care  [x] An examination of body systems using standardized tests and measures addressing any of the following: body structures and functions (impairments), activity limitations, and/or participation restrictions;:  [x] a total of 1-2 or more elements   [] a total of 3 or more elements   [] a total of 4 or more elements   [x] A clinical presentation with:  [x] stable and/or uncomplicated characteristics   [] evolving clinical presentation with changing characteristics  [] unstable and unpredictable characteristics;   [x] Clinical decision making of [x] low, [] moderate, [] high complexity using standardized patient assessment instrument and/or measurable assessment of functional outcome. [x] EVAL (LOW) 83862 (typically 20 minutes face-to-face)  [] EVAL (MOD) 43025 (typically 30 minutes face-to-face)  [] EVAL (HIGH) 18563 (typically 45 minutes face-to-face)  [] RE-EVAL     PLAN:   Frequency/Duration:  1-2 days per week for 4-6 Weeks:  Interventions:  [x]  Therapeutic exercise including: strength training, ROM, for scapula, core and Upper extremity, including postural re-education. [x]  NMR activation and proprioception for UE, periscapular and RC muscles and Core, including postural re-education. [x]  Manual therapy as indicated for shoulder, scapula, spine and associated soft tissue including: Dry Needling/IASTM, STM, PROM, Gr I-IV mobilizations, manipulation. [x] Modalities as needed that may include: thermal agents, E-stim, Biofeedback, US, iontophoresis as indicated  [x] Patient education on joint protection, postural re-education, activity modification, progression of HEP. 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 \"   [] Progressing: [] Met: [] Not Met: [] Adjusted    Therapist goals for Patient:   Short Term Goals: To be achieved in: 3 weeks  1.  Independent in HEP and progression per patient tolerance, in order to prevent re-injury. [] Progressing: [] Met: [] Not Met: [] Adjusted  2. Patient will have a decrease in pain to facilitate improvement in movement, function, and ADLs as indicated by Functional Deficits. [] Progressing: [] Met: [] Not Met: [] Adjusted    Long Term Goals: To be achieved in: 6 weeks  1. Disability index score of 75% or less for the Quick DASH to assist with reaching prior level of function. [] Progressing: [] Met: [] Not Met: [] Adjusted  2. Patient will demonstrate increased L shoulder flex/abd AROM to 120 deg to allow for proper joint functioning as indicated by Functional Deficits. [] Progressing: [] Met: [] Not Met: [] Adjusted  3. Patient will demonstrate an increase in NM recruitment/activation and overall GH and scapular strength to within n5lbs HHD or WNL for proper functional mobility as indicated by patients Functional Deficits. [] Progressing: [] Met: [] Not Met: [] Adjusted  4. Patient will return to doing heavy household chores without increased symptoms or restriction. [] Progressing: [] Met: [] Not Met: [] Adjusted      Electronically signed by:  Michael Kitchen PT      Note: If patient does not return for scheduled/recommended follow up visits, this note will serve as a discharge from care along with the most recent update on progress.

## 2022-02-15 NOTE — TELEPHONE ENCOUNTER
General Question     Subject: PATIENT IS REQUESTING A CALL BACK IN REGARDS TO SHOULDER NUMBNESS. ADVISED BY NEURO NURSE THE PROBLEM IS NOT THE CERVICAL SPINE AND PATIENT STATES SHE IS NOT HAVING ANY PAIN IN HER NECK. SHE BELIEVES THE PROBLEM IS HER SHOULDER DUE TO A FALL. PLEASE ADVISE.    [facility]  Contact Number: [phone]

## 2022-02-15 NOTE — TELEPHONE ENCOUNTER
Per  Dr. Chantal Galarza, she needs to speak to her Neurologist regarding these symptoms to see if they need to see her. If they do no, she needs to make an apt to see Dr. Chantal Galarza regarding her cervical spine. If the patient calls back asking for an apt, please schedule her for evaluation of her cervical spine.

## 2022-02-15 NOTE — TELEPHONE ENCOUNTER
I called the patient. She states she has brachial pain in the left arm, tingling down the arm, no neck pain. She completed the Medrol Dosepak. These symptoms started 4 days ago, after her last visit, no injury. The patient was scheduled to see Dr. Daquan Everett on Thursday for further evaluation.

## 2022-02-17 ENCOUNTER — HOSPITAL ENCOUNTER (OUTPATIENT)
Dept: PHYSICAL THERAPY | Age: 42
Setting detail: THERAPIES SERIES
Discharge: HOME | End: 2022-02-17
Payer: COMMERCIAL

## 2022-02-17 ENCOUNTER — OFFICE VISIT (OUTPATIENT)
Dept: ORTHOPEDIC SURGERY | Age: 42
End: 2022-02-17
Payer: COMMERCIAL

## 2022-02-17 VITALS — WEIGHT: 158 LBS | BODY MASS INDEX: 31.85 KG/M2 | HEIGHT: 59 IN

## 2022-02-17 DIAGNOSIS — M75.82 ROTATOR CUFF TENDONITIS, LEFT: ICD-10-CM

## 2022-02-17 DIAGNOSIS — R20.2 NUMBNESS AND TINGLING OF LEFT UPPER EXTREMITY: Primary | ICD-10-CM

## 2022-02-17 DIAGNOSIS — R20.0 NUMBNESS AND TINGLING OF LEFT UPPER EXTREMITY: Primary | ICD-10-CM

## 2022-02-17 PROCEDURE — 97110 THERAPEUTIC EXERCISES: CPT

## 2022-02-17 PROCEDURE — 99213 OFFICE O/P EST LOW 20 MIN: CPT | Performed by: ORTHOPAEDIC SURGERY

## 2022-02-17 PROCEDURE — G8417 CALC BMI ABV UP PARAM F/U: HCPCS | Performed by: ORTHOPAEDIC SURGERY

## 2022-02-17 PROCEDURE — 4004F PT TOBACCO SCREEN RCVD TLK: CPT | Performed by: ORTHOPAEDIC SURGERY

## 2022-02-17 PROCEDURE — G8427 DOCREV CUR MEDS BY ELIG CLIN: HCPCS | Performed by: ORTHOPAEDIC SURGERY

## 2022-02-17 PROCEDURE — 97530 THERAPEUTIC ACTIVITIES: CPT

## 2022-02-17 PROCEDURE — G8484 FLU IMMUNIZE NO ADMIN: HCPCS | Performed by: ORTHOPAEDIC SURGERY

## 2022-02-17 PROCEDURE — 97161 PT EVAL LOW COMPLEX 20 MIN: CPT

## 2022-02-17 NOTE — PROGRESS NOTES
Petrona Vargas  <>  February 17, 2022    Chief Complaint   Patient presents with    Follow-up     Left shoulder       History: The patient is a 57-year-old female who is here for evaluation of her left shoulder. The patient states that the Medrol Dosepak did not help her symptoms. The patient reportedly was watching the Super Bowl on Sunday when she went to grab some dip. She dropped the dip on the floor due to severe numbness in her left hand and arm. The numbness has remained. She did call the neurosurgery department at [facility] and they recommended follow-up with me. We did discuss the issue over the phone with the patient and we were concerned about a issue with her shunt. She rates the pain as 9/10. She is taking Norco 7.5 mg twice a day. She is right-hand dominant. She is currently in pain management at [facility].  She has difficulty raising her arm. She has no prior history of left shoulder issues. I did perform a left knee arthroscopy in the remote past and she healed uneventfully. The patient does have an extensive medical history including history of congenital hydrocephalus. The patient does have a  shunt. She did have to have a recent surgery down in [place] for the shunt. She temporarily moved to [place]. The patient's  past medical history, medications, allergies,  family history, social history, and have been reviewed, and dated and are recorded in the chart. Pertinent items are noted in HPI. Review of systems reviewed from Pertinent History Form dated on 1/25/22 and available in the patient's chart under the Media tab. Vitals:  Ht 4' 11\" (1.499 m)   Wt 158 lb (71.7 kg)   LMP 10/31/2016 (Exact Date)   BMI 31.91 kg/m²     Physical: Physical: The patient presents today in no acute distress. She is alert and oriented to person, place and time. She displays appropriate mood and affect. She is well dressed, nourished and  groomed.  She has a normal affect. Examination of the neck reveals no evidence of tenderness. Spurling's sign is negative. Active range of motion of the left shoulder is: 140 degrees abduction, 145 degrees forward flexion, 40 degrees of external rotation and internal rotation to L4. Passive range of motion of the left shoulder is: 150 degrees abduction, 150 degrees forward flexion, 50 degrees of external rotation and internal rotation to L3. Active and passive range of motion of the opposite shoulder is full. Examination of the left shoulder reveals positive Neer and Green' impingement signs. There is mild subacromial crepitus with range of motion. Drop arm test is negative on the left. Speed's test is negative. There is no evidence of tenderness over the Bicipital groove. She  does have mild tenderness [facility] the [facility] [place] joint. Cross body adduction test is positive. She has moderate tenderness over the subacromial space. There is no evidence of scapular winging. Lift off sign is negative. There is no evidence of atrophy. External rotation strength is 4+/5 on the left. There are no skin lesions, cellulitis, or extreme edema in the upper extremities. Sensation is subjectively decreased in the median nerve distribution. The patient has warm and well-perfused bilateral upper extremities with brisk capillary refill. Radial and ulnar pulses are palpable and 2+ bilaterally. Carpal compression test on the left was slightly positive      X-rays: MRI of the left shoulder was again extensively reviewed. There is diffuse rotator cuff tendinitis. The rotator cuff is intact. There is no evidence of tear. The labrum is intact. There are no lytic or blastic lesions within the bone. There is mild AC joint arthritis. Impression: Left shoulder rotator cuff tendonitis #2 left shoulder AC joint arthritis #3 left arm numbness    Plan: At this time, we will continue to treat the patient conservatively.   We will go ahead and send the patient

## 2022-02-17 NOTE — FLOWSHEET NOTE
[address] [facility] [address]  Phone: [phone]   Fax:     [phone]        Physical Therapy Treatment Note/ Progress Report:       Date:  2022    Patient Name:  Jocelyn Garcia    :  1980  MRN: [id number]    Pertinent Medical History:Additional Pertinent Hx: HPL, DM, OA of L knee    Medical/Treatment Diagnosis Information:  · Diagnosis: M75.82 (ICD-10-CM) - Rotator cuff tendonitis, left  · Treatment Diagnosis: Decreased strength and mobility. Insurance/Certification information:  PT [facility] Information[facility]  Physician Information:  Referring Practitioner: Ripley Rinne, MD  Plan of care signed (Y/N): sent on 22    Date of Patient follow up with Physician:      Progress Report: []  Yes  [x]  No     Date Range for reporting period:  Beginnin2022  Ending:      Progress report due (10 Rx/or 30 days whichever is less): 3/34/83     Recertification due (POC duration/ or 90 days whichever is less):      Visit # POC/Insurance Allowable Auth Needed   1 [employer] - 30 visits pcy [x]Yes    []No     Functional Outcomes Measure:   Date Assessed: at eval (22)  Test: QDASH   Score:  98%    Pain level:  9/10     History of Injury: Patient reports L shoulder pain that began 4 months ago secondary to a fall down the stairs. Xray results were negative for a fracture. MRI results indicated tendinopathy of RTC musculature. She also reports L hand pain like her hand is falling asleep. She reports NT from her elbow down to her hand. Most shoulder pain is located in deltoid insertion and along joint line. Pt reports immediate bruising after the injury. Pt denies neck pain. She reports pain on posterior-superior shoulder blade. Pt reports some popping/clicking that feels deep. She states she is at increased risk of infections secondary to the  shunt.  She has not had an injection to the L shoulder secondary to increased risk of infections. Pt was given sling, however it does not fit her properly so she has not been wearing it. She also states she is nervous and nauseous as she took her pain medication on an empty stomach. SUBJECTIVE:  See eval    OBJECTIVE:    Observation:    Test measurements:      RESTRICTIONS/PRECAUTIONS: Pt has a  shunt on L side. Exercises/Interventions:   Therapeutic Ex (97362)  Min: Resistance/Reps Cues/Notes   Scapular Retraction 2x5x3 second Poor brigette. Manual Intervention  (55091)  Min:               NMR re-education (61308)  Min:               Therapeutic Activity (62312)  Min:     Pt education on PT POC, functional anatomy, PT dx, etc.           Modalities:  Min                 Other Therapeutic Activities:  Pt was educated on PT POC, Diagnosis, Prognosis, pathomechanics as well as frequency and duration of scheduling future physical therapy appointments. Time was also taken on this day to answer all patient questions and participation in PT. Reviewed appointment policy in detail with patient and patient verbalized understanding. Home Exercise Program:   Scapular retraction    Therapeutic Exercise and NMR EXR  [x] (59715) Provided verbal/tactile cueing for activities related to strengthening, flexibility, endurance, ROM  for improvements in scapular, scapulothoracic and UE control with self care, reaching, carrying, lifting, house/yardwork, driving/computer work.    [] (82024) Provided verbal/tactile cueing for activities related to improving balance, coordination, kinesthetic sense, posture, motor skill, proprioception  to assist with  scapular, scapulothoracic and UE control with self care, reaching, carrying, lifting, house/yardwork, driving/computer work.     Therapeutic Activities:    [x] (54612 or 63687) Provided verbal/tactile cueing for activities related to improving balance, coordination, kinesthetic sense, posture, motor skill, proprioception and motor activation to allow for proper function of scapular, scapulothoracic and UE control with self care, carrying, lifting, driving/computer work.      Home Exercise Program:    [x] (20270) Reviewed/Progressed HEP activities related to strengthening, flexibility, endurance, ROM of scapular, scapulothoracic and UE control with self care, reaching, carrying, lifting, house/yardwork, driving/computer work  [] (45040) Reviewed/Progressed HEP activities related to improving balance, coordination, kinesthetic sense, posture, motor skill, proprioception of scapular, scapulothoracic and UE control with self care, reaching, carrying, lifting, house/yardwork, driving/computer work      Manual Treatments:  PROM / STM / Oscillations-Mobs:  G-I, II, III, IV (PA's, Inf., Post.)  [x] (48135) Provided manual therapy to mobilize soft tissue/joints of cervical/CT, scapular GHJ and UE for the purpose of modulating pain, promoting relaxation,  increasing ROM, reducing/eliminating soft tissue swelling/inflammation/restriction, improving soft tissue extensibility and allowing for proper ROM for normal function with self care, reaching, carrying, lifting, house/yardwork, driving/computer work    Charges:  Timed Code Treatment Minutes: 30   Total Treatment Minutes: 45       [x] EVAL (LOW) 75480 (typically 20 minutes face-to-face)  [] EVAL (MOD) 40292 (typically 30 minutes face-to-face)  [] EVAL (HIGH) 79461 (typically 45 minutes face-to-face)  [] RE-EVAL     [x] JA(52178) x     [] Dry needle 1 or 2 Muscles (65417)  [] NMR (94126) x     [] Dry needle 3+ Muscles (87520)  [x] Manual (93392) x     [] Ultrasound (81675) x  [] TA (58754) x     [] Mech Traction (14410)  [] ES(attended) (92658)     [] ES (un) (18794):   [] Vasopump (35892) [] Ionto (48456)   [] Other:    If [facility] Please Indicate Time In/Out  CPT Code Time in Time out                                   Approval Dates:  CPT Code Units Approved Units Used  Date Updated:                     GOALS:  Patient stated goal: \"To not be in pain and return back to work/do hair again. \"   []? Progressing: []? Met: []? Not Met: []? Adjusted     Therapist goals for Patient:   Short Term Goals: To be achieved in: 3 weeks  1. Independent in HEP and progression per patient tolerance, in order to prevent re-injury. []? Progressing: []? Met: []? Not Met: []? Adjusted  2. Patient will have a decrease in pain to facilitate improvement in movement, function, and ADLs as indicated by Functional Deficits. []? Progressing: []? Met: []? Not Met: []? Adjusted     Long Term Goals: To be achieved in: 6 weeks  1. Disability index score of 75% or less for the Quick DASH to assist with reaching prior level of function. []? Progressing: []? Met: []? Not Met: []? Adjusted  2. Patient will demonstrate increased L shoulder flex/abd AROM to 120 deg to allow for proper joint functioning as indicated by Functional Deficits. []? Progressing: []? Met: []? Not Met: []? Adjusted  3. Patient will demonstrate an increase in NM recruitment/activation and overall GH and scapular strength to within n5lbs HHD or WNL for proper functional mobility as indicated by patients Functional Deficits. []? Progressing: []? Met: []? Not Met: []? Adjusted  4. Patient will return to doing heavy household chores without increased symptoms or restriction. []? Progressing: []? Met: []? Not Met: []? Adjusted       ASSESSMENT:  See eval    Treatment/Activity Tolerance:  [x] Patient tolerated treatment well [] Patient limited by fatique  [] Patient limited by pain  [] Patient limited by other medical complications  [] Other:     Overall Progression Towards Functional goals/ Treatment Progress Update:  [] Patient is progressing as expected towards functional goals listed. [] Progression is slowed due to complexities/Impairments listed. [] Progression has been slowed due to co-morbidities.   [x] Plan just implemented, too soon to assess goals progression <30days   [] Goals require adjustment due to lack of progress  [] Patient is not progressing as expected and requires additional follow up with physician  [] Other    Prognosis for POC: [x] Good [] Fair  [] Poor    Patient requires continued skilled intervention: [x] Yes  [] No      PLAN: See eval  [] Continue per plan of care [] Alter current plan (see comments)  [x] Plan of care initiated [] Hold pending MD visit [] Discharge    Electronically signed by: Orlan Closs, PT     Note: If patient does not return for scheduled/recommended follow up visits, this note will serve as a discharge from care along with the most recent update on progress.

## 2022-02-23 ENCOUNTER — TELEPHONE (OUTPATIENT)
Dept: PRIMARY CARE CLINIC | Age: 42
End: 2022-02-23

## 2022-02-23 NOTE — TELEPHONE ENCOUNTER
Pt called on call stating that she had a heavy pressure in her head and was having blurred vision. She states this is not like her migraines where she gets blurred vision. She states \"everything looks like it's under a veil\". She states she went to the ER last night for the recurrence of her rash and swelling on her face. They did an XRAY and the ER MD said it was normal but she reports that on her mychart results it states it is coiled and she is concerned about this. She reports she has tried to call her neurologist on call with no response. Instructed pt she needs to go to ER for her blurred vision as there could be many causes besides the  shunt. Pt also mentions how frustrated she is with all of her recent referrals and seeing different providers and she just wants answers. Discussed with pt that she needs to call our office to schedule an in person visit so we can discuss all of these concerns. home

## 2022-02-24 ENCOUNTER — HOSPITAL ENCOUNTER (OUTPATIENT)
Dept: PHYSICAL THERAPY | Age: 42
Setting detail: THERAPIES SERIES
Discharge: HOME | End: 2022-02-24
Payer: COMMERCIAL

## 2022-02-24 PROCEDURE — 97110 THERAPEUTIC EXERCISES: CPT

## 2022-02-24 NOTE — FLOWSHEET NOTE
[address] [facility] [place]. [address]  Phone: [phone]   Fax:     [phone]        Physical Therapy Treatment Note/ Progress Report:       Date:  2022    Patient Name:  Dbeorah Nick    :  1980  MRN: [id number]    Pertinent Medical History:Additional Pertinent Hx: HPL, DM, OA of L knee    Medical/Treatment Diagnosis Information:  · Diagnosis: M75.82 (ICD-10-CM) - Rotator cuff tendonitis, left  · Treatment Diagnosis: Decreased strength and mobility. Insurance/Certification information:  PT Insurance Information: [employer]  Physician Information:  Referring Practitioner: Monica Luciano MD  Plan of care signed (Y/N): sent on 22    Date of Patient follow up with Physician:      Progress Report: []  Yes  [x]  No     Date Range for reporting period:  Beginnin2022  Ending:      Progress report due (10 Rx/or 30 days whichever is less):      Recertification due (POC duration/ or 90 days whichever is less):      Visit # POC/Insurance Allowable Auth Needed   2 [employer] - 30 visits pcy [x]Yes    []No     Functional Outcomes Measure:   Date Assessed: at eval (22)  Test: QDASH   Score:  98%    Pain level:  8/10     History of Injury: Patient reports L shoulder pain that began 4 months ago secondary to a fall down the stairs. Xray results were negative for a fracture. MRI results indicated tendinopathy of RTC musculature. She also reports L hand pain like her hand is falling asleep. She reports NT from her elbow down to her hand. Most shoulder pain is located in deltoid insertion and along joint line. Pt reports immediate bruising after the injury. Pt denies neck pain. She reports pain on posterior-superior shoulder blade. Pt reports some popping/clicking that feels deep. She states she is at increased risk of infections secondary to the  shunt.  She has not had an injection to the L shoulder secondary to increased risk of infections. Pt was given sling, however it does not fit her properly so she has not been wearing it. She also states she is nervous and nauseous as she took her pain medication on an empty stomach. SUBJECTIVE:   2/24/22: Pt reports, \"My arm is really hurting. \" She also states her hand is bothering her, which she contributes to carpal tunnel syndrome. Pt presents with L UE in a guarded position. OBJECTIVE:    Observation:    Test measurements:      RESTRICTIONS/PRECAUTIONS: Pt has a  shunt on L side. Exercises/Interventions:   Therapeutic Ex (38620)  Min: 30' Resistance/Reps Cues/Notes   Scapular Retraction 2x5x3 second Poor brigette. Pendulum hang 5x10 sec Leaning forward only, no movement   Table Slides - Flex, Scaption 2x5 each  Poor brigette. Elbow Flex AROM Poor Brigette. Unable to perform secondary to shoulder pain. Supination/Pronation AROM x10    Gripping Towel Roll x10          Manual Intervention  (69150)  Min:               NMR re-education (87714)  Min:               Therapeutic Activity (09609)  Min:     Pt education on PT POC, functional anatomy, PT dx, etc.           Modalities:  10'     Cold Pack 10' Seated in Chair          Other Therapeutic Activities:  Pt was educated on PT POC, Diagnosis, Prognosis, pathomechanics as well as frequency and duration of scheduling future physical therapy appointments. Time was also taken on this day to answer all patient questions and participation in PT. Reviewed appointment policy in detail with patient and patient verbalized understanding.      Home Exercise Program:   Scapular retraction    Therapeutic Exercise and NMR EXR  [x] (86917) Provided verbal/tactile cueing for activities related to strengthening, flexibility, endurance, ROM  for improvements in scapular, scapulothoracic and UE control with self care, reaching, carrying, lifting, house/yardwork, driving/computer work.    [] (13166) Provided verbal/tactile cueing for activities related to improving balance, coordination, kinesthetic sense, posture, motor skill, proprioception  to assist with  scapular, scapulothoracic and UE control with self care, reaching, carrying, lifting, house/yardwork, driving/computer work. Therapeutic Activities:    [x] (84369 or 91911) Provided verbal/tactile cueing for activities related to improving balance, coordination, kinesthetic sense, posture, motor skill, proprioception and motor activation to allow for proper function of scapular, scapulothoracic and UE control with self care, carrying, lifting, driving/computer work.      Home Exercise Program:    [x] (42261) Reviewed/Progressed HEP activities related to strengthening, flexibility, endurance, ROM of scapular, scapulothoracic and UE control with self care, reaching, carrying, lifting, house/yardwork, driving/computer work  [] (12161) Reviewed/Progressed HEP activities related to improving balance, coordination, kinesthetic sense, posture, motor skill, proprioception of scapular, scapulothoracic and UE control with self care, reaching, carrying, lifting, house/yardwork, driving/computer work      Manual Treatments:  PROM / STM / Oscillations-Mobs:  G-I, II, III, IV (PA's, Inf., Post.)  [x] (97396) Provided manual therapy to mobilize soft tissue/joints of cervical/CT, scapular GHJ and UE for the purpose of modulating pain, promoting relaxation,  increasing ROM, reducing/eliminating soft tissue swelling/inflammation/restriction, improving soft tissue extensibility and allowing for proper ROM for normal function with self care, reaching, carrying, lifting, house/yardwork, driving/computer work    Charges:  Timed Code Treatment Minutes: 30   Total Treatment Minutes: 40       [] EVAL (LOW) 04695 (typically 20 minutes face-to-face)  [] EVAL (MOD) 71935 (typically 30 minutes face-to-face)  [] EVAL (HIGH) 42320 (typically 45 minutes face-to-face)  [] RE-EVAL [x] JS(40301) x 2     [] Dry needle 1 or 2 Muscles (47412)  [] NMR (22906) x     [] Dry needle 3+ Muscles (49889)  [] Manual (81789) x     [] Ultrasound (38586) x  [] TA (68476) x     [] Mech Traction (52979)  [] ES(attended) (27213)     [] ES (un) (46951):   [] Vasopump (56231) [] Ionto (53900)   [] Other:    If [facility] Please Indicate Time In/Out  CPT Code Time in Time out                                   Approval Dates:  CPT Code Units Approved Units Used  Date Updated:                     GOALS:  Patient stated goal: \"To not be in pain and return back to work/do hair again. \"   []? Progressing: []? Met: []? Not Met: []? Adjusted     Therapist goals for Patient:   Short Term Goals: To be achieved in: 3 weeks  1. Independent in HEP and progression per patient tolerance, in order to prevent re-injury. []? Progressing: []? Met: []? Not Met: []? Adjusted  2. Patient will have a decrease in pain to facilitate improvement in movement, function, and ADLs as indicated by Functional Deficits. []? Progressing: []? Met: []? Not Met: []? Adjusted     Long Term Goals: To be achieved in: 6 weeks  1. Disability index score of 75% or less for the Quick DASH to assist with reaching prior level of function. []? Progressing: []? Met: []? Not Met: []? Adjusted  2. Patient will demonstrate increased L shoulder flex/abd AROM to 120 deg to allow for proper joint functioning as indicated by Functional Deficits. []? Progressing: []? Met: []? Not Met: []? Adjusted  3. Patient will demonstrate an increase in NM recruitment/activation and overall GH and scapular strength to within n5lbs HHD or WNL for proper functional mobility as indicated by patients Functional Deficits. []? Progressing: []? Met: []? Not Met: []? Adjusted  4. Patient will return to doing heavy household chores without increased symptoms or restriction. []? Progressing: []? Met: []? Not Met: []?  Adjusted       ASSESSMENT:  Pt demonstrates poor tolerance to PT follow up appt. Pt is demonstrating signs of complex regional pain syndrome leading to limitations in progressing PT treatment. No adverse reaction to today's treatment. Continue to progress patient as tolerated. Treatment/Activity Tolerance:  [x] Patient tolerated treatment well [] Patient limited by fatique  [] Patient limited by pain  [] Patient limited by other medical complications  [] Other:     Overall Progression Towards Functional goals/ Treatment Progress Update:  [] Patient is progressing as expected towards functional goals listed. [] Progression is slowed due to complexities/Impairments listed. [] Progression has been slowed due to co-morbidities. [x] Plan just implemented, too soon to assess goals progression <30days   [] Goals require adjustment due to lack of progress  [] Patient is not progressing as expected and requires additional follow up with physician  [] Other    Prognosis for POC: [x] Good [] Fair  [] Poor    Patient requires continued skilled intervention: [x] Yes  [] No      PLAN: See eval  [] Continue per plan of care [] Alter current plan (see comments)  [x] Plan of care initiated [] Hold pending MD visit [] Discharge    Electronically signed by: Rody Brown, PT     Note: If patient does not return for scheduled/recommended follow up visits, this note will serve as a discharge from care along with the most recent update on progress.

## 2022-02-28 ENCOUNTER — OFFICE VISIT (OUTPATIENT)
Dept: PRIMARY CARE CLINIC | Age: 42
End: 2022-02-28
Payer: COMMERCIAL

## 2022-02-28 VITALS
WEIGHT: 158 LBS | DIASTOLIC BLOOD PRESSURE: 78 MMHG | HEART RATE: 96 BPM | HEIGHT: 59 IN | OXYGEN SATURATION: 99 % | SYSTOLIC BLOOD PRESSURE: 120 MMHG | BODY MASS INDEX: 31.85 KG/M2

## 2022-02-28 DIAGNOSIS — R20.0 NUMBNESS AND TINGLING IN LEFT HAND: Primary | ICD-10-CM

## 2022-02-28 DIAGNOSIS — R20.2 NUMBNESS AND TINGLING IN LEFT HAND: Primary | ICD-10-CM

## 2022-02-28 DIAGNOSIS — Z98.2 VP (VENTRICULOPERITONEAL) SHUNT STATUS: ICD-10-CM

## 2022-02-28 DIAGNOSIS — H61.23 IMPACTED CERUMEN, BILATERAL: ICD-10-CM

## 2022-02-28 DIAGNOSIS — F90.9 ATTENTION DEFICIT HYPERACTIVITY DISORDER (ADHD), UNSPECIFIED ADHD TYPE: ICD-10-CM

## 2022-02-28 DIAGNOSIS — H53.9 VISION CHANGES: ICD-10-CM

## 2022-02-28 DIAGNOSIS — R11.2 INTRACTABLE VOMITING WITH NAUSEA: ICD-10-CM

## 2022-02-28 DIAGNOSIS — R42 DIZZINESS: ICD-10-CM

## 2022-02-28 DIAGNOSIS — Q03.9 CONGENITAL HYDROCEPHALUS (HCC): ICD-10-CM

## 2022-02-28 PROCEDURE — 99214 OFFICE O/P EST MOD 30 MIN: CPT | Performed by: NURSE PRACTITIONER

## 2022-02-28 PROCEDURE — G8427 DOCREV CUR MEDS BY ELIG CLIN: HCPCS | Performed by: NURSE PRACTITIONER

## 2022-02-28 PROCEDURE — 4004F PT TOBACCO SCREEN RCVD TLK: CPT | Performed by: NURSE PRACTITIONER

## 2022-02-28 PROCEDURE — G8484 FLU IMMUNIZE NO ADMIN: HCPCS | Performed by: NURSE PRACTITIONER

## 2022-02-28 PROCEDURE — G8417 CALC BMI ABV UP PARAM F/U: HCPCS | Performed by: NURSE PRACTITIONER

## 2022-02-28 RX ORDER — HYDROCODONE BITARTRATE AND ACETAMINOPHEN 7.5; 325 MG/1; MG/1
TABLET ORAL
COMMUNITY
Start: 2022-02-01 | End: 2022-06-10

## 2022-02-28 RX ORDER — METHYLPREDNISOLONE 4 MG/1
TABLET ORAL
Qty: 1 KIT | Refills: 0 | Status: SHIPPED | OUTPATIENT
Start: 2022-02-28 | End: 2022-03-06

## 2022-02-28 RX ORDER — GABAPENTIN 100 MG/1
100 CAPSULE ORAL 3 TIMES DAILY
COMMUNITY
Start: 2022-02-01 | End: 2022-02-28

## 2022-02-28 RX ORDER — PROMETHAZINE HYDROCHLORIDE 25 MG/1
25 TABLET ORAL 4 TIMES DAILY PRN
Qty: 20 TABLET | Refills: 0 | Status: SHIPPED | OUTPATIENT
Start: 2022-02-28 | End: 2022-03-07

## 2022-02-28 RX ORDER — MECLIZINE HCL 12.5 MG/1
12.5 TABLET ORAL 3 TIMES DAILY PRN
Qty: 30 TABLET | Refills: 0 | Status: SHIPPED | OUTPATIENT
Start: 2022-02-28 | End: 2022-03-10

## 2022-02-28 NOTE — PATIENT INSTRUCTIONS
Patient Education        Dizziness: Care Instructions  Your Care Instructions  Dizziness is the feeling of unsteadiness or fuzziness in your head. It is different than having vertigo, which is a feeling that the room is spinning or that you are moving or falling. It is also different from lightheadedness, which is the feeling that you are about to faint. It can be hard to know what causes dizziness. Some people feel dizzy when they have migraine headaches. Sometimes bouts of flu can make you feel dizzy. Some medical conditions, such as heart problems or high blood pressure, can make you feel dizzy. Many medicines can cause dizziness, including medicines for high blood pressure, pain, or anxiety. If a medicine causes your symptoms, your doctor may recommend that you stop or change the medicine. If it is a problem with your heart, you may need medicine to help your heart work better. If there is no clear reason for your symptoms, your doctor may suggest watching and waiting for a while to see if the dizziness goes away on its own. Follow-up care is a key part of your treatment and safety. Be sure to make and go to all appointments, and call your doctor if you are having problems. It's also a good idea to know your test results and keep a list of the medicines you take. How can you care for yourself at home? · If your doctor recommends or prescribes medicine, take it exactly as directed. Call your doctor if you think you are having a problem with your medicine. · Do not drive while you feel dizzy. · Try to prevent falls. Steps you can take include:  ? Using nonskid mats, adding grab bars near the tub, and using night-lights. ? Clearing your home so that walkways are free of anything you might trip on.  ? Letting family and friends know that you have been feeling dizzy. This will help them know how to help you. When should you call for help? Call 911 anytime you think you may need emergency care.  For example, call if:    · You passed out (lost consciousness).     · You have dizziness along with symptoms of a heart attack. These may include:  ? Chest pain or pressure, or a strange feeling in the chest.  ? Sweating. ? Shortness of breath. ? Nausea or vomiting. ? Pain, pressure, or a strange feeling in the back, neck, jaw, or upper belly or in one or both shoulders or arms. ? Lightheadedness or sudden weakness. ? A fast or irregular heartbeat.     · You have symptoms of a stroke. These may include:  ? Sudden numbness, tingling, weakness, or loss of movement in your face, arm, or leg, especially on only one side of your body. ? Sudden vision changes. ? Sudden trouble speaking. ? Sudden confusion or trouble understanding simple statements. ? Sudden problems with walking or balance. ? A sudden, severe headache that is different from past headaches. Call your doctor now or seek immediate medical care if:    · You feel dizzy and have a fever, headache, or ringing in your ears.     · You have new or increased nausea and vomiting.     · Your dizziness does not go away or comes back. Watch closely for changes in your health, and be sure to contact your doctor if:    · You do not get better as expected. Where can you learn more? Go to https://[email or web address].[email or web address] org and sign in to your [email or web address].it account. Enter V483 in the [facility] box to learn more about \"Dizziness: Care Instructions. \"     If you do not have an account, please click on the \"Sign Up Now\" link. Current as of: July 1, 2021               Content Version: 13.1  © 0124-9798 Healthwise, Incorporated. Care instructions adapted under license by [facility] ([facility]). If you have questions about a medical condition or this instruction, always ask your healthcare professional. [place] [address] any warranty or liability for your use of this information.          Patient Education        Numbness and Tingling: Care Instructions  Your Care Instructions     Many things can cause numbness or tingling. Swelling may put pressure on a nerve. This could cause you to lose feeling or have a pins-and-needles sensation on part of your body. Nerves may be damaged from trauma, toxins, or diseases, such as diabetes or multiple sclerosis (MS). Sometimes, though, the cause is not clear. If there is no clear reason for your symptoms, and you are not having any other symptoms, your doctor may suggest watching and waiting for a while to see if the numbness or tingling goes away on its own. Your doctor may want you to have blood or nerve tests to find the cause of your symptoms. Follow-up care is a key part of your treatment and safety. Be sure to make and go to all appointments, and call your doctor if you are having problems. It's also a good idea to know your test results and keep a list of the medicines you take. How can you care for yourself at home? · If your doctor prescribes medicine, take it exactly as directed. Call your doctor if you think you are having a problem with your medicine. · If you have any swelling, put ice or a cold pack on the area for 10 to 20 minutes at a time. Put a thin cloth between the ice and your skin. When should you call for help? Call 911 anytime you think you may need emergency care. For example, call if:    · You have weakness, numbness, or tingling in both legs.     · You lose bowel or bladder control.     · You have symptoms of a stroke. These may include:  ? Sudden numbness, tingling, weakness, or loss of movement in your face, arm, or leg, especially on only one side of your body. ? Sudden vision changes. ? Sudden trouble speaking. ? Sudden confusion or trouble understanding simple statements. ? Sudden problems with walking or balance. ? A sudden, severe headache that is different from past headaches.    Watch closely for changes in your health, and be sure to contact your doctor if you have any problems, or if:    · You do not get better as expected. Where can you learn more? Go to https://chpepiceweb.[email or web address] org and sign in to your Airwoot account. Enter M038 in the [facility] box to learn more about \"Numbness and Tingling: Care Instructions. \"     If you do not have an account, please click on the \"Sign Up Now\" link. Current as of: April 8, 2021               Content Version: 13.1  © 2006-2021 [employer]. Care instructions adapted under license by [facility] ([facility]). If you have questions about a medical condition or this instruction, always ask your healthcare professional. [place] [address] any warranty or liability for your use of this information. Patient Education        Cawthorne Exercises for Vertigo: Care Instructions  Your Care Instructions  Simple exercises can help you regain your balance when you have vertigo. If you have Ménière's disease, benign paroxysmal positional vertigo (BPPV), or another inner ear problem, you may have vertigo off and on. Do these exercises first thing in the morning and before you go to bed. You might get dizzy when you first start them. If this happens, try to do them for at least 5 minutes. Do a group of exercises at a time, starting at the top of the list. It may take several weeks before you can do all the exercises without feeling dizzy. Follow-up care is a key part of your treatment and safety. Be sure to make and go to all appointments, and call your doctor if you are having problems. It's also a good idea to know your test results and keep a list of the medicines you take. How can you care for yourself at home? Exercise 1  While sitting on the side of the bed and holding your head still:  · Look up as far as you can. · Look down as far as you can. · Look from side to side as far as you can. · Stretch your arm straight out in front of you. Focus on your index finger.  Continue to focus on your finger while you bring it to your nose. Exercise 2  While sitting on the side of the bed:  · Bring your head as far back as you can. · Bring your head forward to touch your chin to your chest.  · Turn your head from side to side. · Do these exercises first with your eyes open. Then try with your eyes closed. Exercise 3  While sitting on the side of the bed:  · Shrug your shoulders straight upward, then relax them. · Bend over and try to touch the ground with your fingers. Then go back to a sitting position. · Toss a small ball from one hand to the other. Throw the ball higher than your eyes so you have to look up. Exercise 4  While standing (with someone close by if you feel uncomfortable):  · Repeat Exercise 1.  · Repeat Exercise 2.  · Pass a ball between your legs and above your head. · Sit down and then stand up. Repeat. Turn around in a [place] a different way each time you stand. · With someone close by to help you, try the above exercises with your eyes closed. Exercise 5  In a room that is cleared of obstacles:  · Walk to a corner of the room, turn to your right, and walk back to the starting point. Now, repeat and turn left. · Walk up and down a slope. Now try stairs. · While holding on to someone's arm, try these exercises with your eyes closed. When should you call for help? Watch closely for changes in your health, and be sure to contact your doctor if:    · You do not get better as expected. Where can you learn more? Go to https://mary jo.P[employer]to [email or web address] org and sign in to your [employer] account. Enter B812 in the [employer] box to learn more about \"Cawthorne Exercises for Vertigo: Care Instructions. \"     If you do not have an account, please click on the \"Sign Up Now\" link. Current as of: September 8, 2021               Content Version: 13.1  © 7115-9122 Healthwise, Incorporated. Care instructions adapted under license by [facility] ([facility]).  If you have questions about a medical condition or this instruction, always ask your healthcare professional. [place] [address] any warranty or liability for your use of this information.

## 2022-02-28 NOTE — PROGRESS NOTES
Dimitri Page (:  1980) is a 46 y.o. female,Established patient, here for evaluation of the following chief complaint(s): Other (pt is here today to talk about her shunt, is stating that she is having a head time seeing, has followed up with eye doctor)    Still having the vision issues. Work up in ER was negative. States she went to her eye doctor and they said everything was fine. Still seeing a \"veil\" over everything. Having dizziness as well which makes her nausea worse. Not really having headaches currently. Did have a bad migraine last week. Pt has not followed up with neuro. She states that they do not listen and she does not want to go back to them. Never took steroids that were given from ER due to insurance/pharmacy issues. Requesting phenergan again as she states the scopolamine patch worked but caused her to have a bad reaction to the adhesive. She has seen a derm and allergist who have stated it is an allergy to the shunt causing these issues. Pt states that neurosurgeon at [facility] wants her to see another allergist and dermatologist. She does not know why. She states her neurosurgeon at [address] [place] [address] states that she can't have surgery due to risk of stroke. She states she is frustrated and she is over seeing all these specialists and no one helping her. Has been seeing [facility] for left shoulder issues, and now has numbness and tingling in her left hand. Dr. Elinor Sterling ordered an EMG. Pt states she is now dropping things due to the numbness, tingling and weakness of her left hand. Pt states the only thing that seems to be working well for her is her vyvanse. She hasn't been having any focus issues. ASSESSMENT/PLAN:  1. Numbness and tingling in left hand  -     methylPREDNISolone (MEDROL DOSEPACK) 4 MG tablet; Take by mouth., Disp-1 kit, R-0Normal  2. Congenital hydrocephalus (HCC)  -     meclizine (ANTIVERT) 12.5 MG tablet;  Take 1 tablet by mouth 3 times daily as needed for Dizziness or Nausea, Disp-30 tablet, R-0Normal  3. Vision changes  -     methylPREDNISolone (MEDROL DOSEPACK) 4 MG tablet; Take by mouth., Disp-1 kit, R-0Normal  4.  (ventriculoperitoneal) shunt status  5. Intractable vomiting with nausea  -     meclizine (ANTIVERT) 12.5 MG tablet; Take 1 tablet by mouth 3 times daily as needed for Dizziness or Nausea, Disp-30 tablet, R-0Normal  -     promethazine (PHENERGAN) 25 MG tablet; Take 1 tablet by mouth 4 times daily as needed for Nausea, Disp-20 tablet, R-0Normal  6. Dizziness  -     meclizine (ANTIVERT) 12.5 MG tablet; Take 1 tablet by mouth 3 times daily as needed for Dizziness or Nausea, Disp-30 tablet, R-0Normal  7. Impacted cerumen, bilateral  -     carbamide peroxide (DEBROX) 6.5 % otic solution; Place 5 drops into both ears 2 times daily, Disp-1 each, R-1Normal  8. Attention deficit hyperactivity disorder (ADHD), unspecified ADHD type      Return in about 3 months (around 5/28/2022) for Med Check, ADHD Follow-up. -Most of visit was spent discussing pt situation. Discussed with pt that she needs to have appt with neurosurgery and allergy to discuss what options are available. If surgery is not an option by any means, then what could be done in regards to allergy treatments or immunosuppression. Also pt states that she doesn't care at this point, she would rather have a stroke then feel like this. Discussed that she should have this discussion with her neurosurgeon. Discussed that it is fair for her to ask why seeing another allergist or dermatologist will be beneficial or helpful to her current diagnoses and treatment plan. If it isn't going to add anything, then do not go. She states she doesn't even want to go back to neurosurgery. Discussed that this is important in any treatment that is decided for her. Subjective   SUBJECTIVE/OBJECTIVE:    Review of Systems   Constitutional: Negative for activity change, appetite change, chills, diaphoresis, fatigue and fever. Eyes: Positive for visual disturbance. Negative for photophobia, pain, discharge, redness and itching. Respiratory: Negative for cough and shortness of breath. Cardiovascular: Negative for chest pain. Gastrointestinal: Positive for abdominal pain, nausea and vomiting. Negative for anal bleeding, blood in stool, constipation, diarrhea and rectal pain. Musculoskeletal: Positive for back pain. Neurological: Positive for dizziness, numbness and headaches. Negative for seizures, syncope and weakness. Psychiatric/Behavioral: Positive for sleep disturbance. Negative for self-injury and suicidal ideas. The patient is nervous/anxious. Objective   Physical Exam  Vitals and nursing note reviewed. Constitutional:       Appearance: Normal appearance. She is well-developed and well-groomed. HENT:      Right Ear: There is impacted cerumen. Left Ear: There is impacted cerumen. Eyes:      General: Lids are normal.      Extraocular Movements: Extraocular movements intact. Conjunctiva/sclera: Conjunctivae normal.      Pupils: Pupils are equal, round, and reactive to light. Funduscopic exam:     Right eye: Red reflex present. Left eye: Red reflex present. Cardiovascular:      Rate and Rhythm: Normal rate and regular rhythm. Heart sounds: Normal heart sounds, S1 normal and S2 normal.   Pulmonary:      Effort: Pulmonary effort is normal.      Breath sounds: Normal breath sounds and air entry. Neurological:      General: No focal deficit present. Mental Status: She is alert. Psychiatric:         Attention and Perception: Attention normal.         Mood and Affect: Mood is anxious. Affect is tearful. Behavior: Behavior is cooperative.             On this date 2/28/2022 I have spent 35 minutes reviewing previous notes, test results and face to face with the patient discussing the diagnosis and importance of compliance with the treatment plan as well as documenting on the day of the visit. An electronic signature was used to authenticate this note.     --FREDRICK Soto - CNP

## 2022-03-01 ENCOUNTER — HOSPITAL ENCOUNTER (OUTPATIENT)
Dept: PHYSICAL THERAPY | Age: 42
Setting detail: THERAPIES SERIES
Discharge: HOME | End: 2022-03-01
Payer: COMMERCIAL

## 2022-03-01 PROBLEM — R20.2 NUMBNESS AND TINGLING IN LEFT HAND: Status: ACTIVE | Noted: 2022-03-01

## 2022-03-01 PROBLEM — R20.0 NUMBNESS AND TINGLING IN LEFT HAND: Status: ACTIVE | Noted: 2022-03-01

## 2022-03-01 ASSESSMENT — ENCOUNTER SYMPTOMS
EYE PAIN: 0
NAUSEA: 1
DIARRHEA: 0
COUGH: 0
BLOOD IN STOOL: 0
EYE DISCHARGE: 0
PHOTOPHOBIA: 0
RECTAL PAIN: 0
EYE REDNESS: 0
EYE ITCHING: 0
VOMITING: 1
ANAL BLEEDING: 0
CONSTIPATION: 0
SHORTNESS OF BREATH: 0
BACK PAIN: 1
ABDOMINAL PAIN: 1

## 2022-03-01 NOTE — FLOWSHEET NOTE
[address] [facility]  Phone: [phone]   Fax:     [phone]    Physical Therapy  Cancellation/No-show Note  Patient Name:  Jael Jones  :  1980   Date:  3/1/2022  Cancelled visits to date: 0  No-shows to date: 1    Patient status for today's appointment patient:  []  Cancelled  []  Rescheduled appointment  [x]  No-show     Reason given by patient:  [x]  Patient ill  []  Conflicting appointment  []  No transportation    []  Conflict with work  []  No reason given  [x]  Other:     Comments: Pt stated, \"I completely forgot about my appointment and I have a stomach bug that kept me up all night last night. \"     Phone call information:   [x]  Phone call made today to patient at 1:35 pm time at number provided:      [x]  Patient answered, conversation as follows:  Pt states she completely forgot about her appointment and that she has a stomach bug. She states she was up all night last night and hasn't been feeling well today.   Pt confirmed her appointment on 3/3/22 at 1:15pm.    []  Patient did not answer, message left as follows:  []  Phone call not made today    Electronically signed by:  Fareed Gracia PT

## 2022-03-03 ENCOUNTER — HOSPITAL ENCOUNTER (OUTPATIENT)
Dept: PHYSICAL THERAPY | Age: 42
Setting detail: THERAPIES SERIES
Discharge: HOME | End: 2022-03-03
Payer: COMMERCIAL

## 2022-03-03 DIAGNOSIS — F90.9 ATTENTION DEFICIT HYPERACTIVITY DISORDER (ADHD), UNSPECIFIED ADHD TYPE: ICD-10-CM

## 2022-03-03 RX ORDER — LISDEXAMFETAMINE DIMESYLATE 50 MG
50 CAPSULE ORAL EVERY MORNING
Qty: 30 CAPSULE | Refills: 0 | Status: SHIPPED | OUTPATIENT
Start: 2022-03-03 | End: 2022-04-04 | Stop reason: SDUPTHER

## 2022-03-03 NOTE — FLOWSHEET NOTE
[address] [facility] [place] [address] [address]  Phone: [phone]   Fax:     [phone]    Physical Therapy  Cancellation/No-show Note  Patient Name:  Erskine Schaumann  :  1980   Date:  3/3/2022  Cancelled visits to date: 1  No-shows to date: 1    Patient status for today's appointment patient:  [x]  Cancelled  []  Rescheduled appointment  []  No-show     Reason given by patient:  [x]  Patient ill - Pt has a migraine   []  Conflicting appointment  []  No transportation    []  Conflict with work  []  No reason given  []  Other:     Comments:     Phone call information:   []  Phone call made today to patient at  time at number provided:      []  Patient answered, conversation as follows:     []  Patient did not answer, message left as follows:  [x]  Phone call not made today. - Pt called the clinic to cancel her appt.      Electronically signed by:  Javier Garza PT

## 2022-03-08 ENCOUNTER — HOSPITAL ENCOUNTER (OUTPATIENT)
Dept: PHYSICAL THERAPY | Age: 42
Setting detail: THERAPIES SERIES
Discharge: HOME | End: 2022-03-08
Payer: COMMERCIAL

## 2022-03-08 PROCEDURE — 97110 THERAPEUTIC EXERCISES: CPT

## 2022-03-08 PROCEDURE — 97530 THERAPEUTIC ACTIVITIES: CPT

## 2022-03-08 NOTE — FLOWSHEET NOTE
[address] [facility] [place] [place] [address]  Phone: [phone]   Fax:     [phone]        Physical Therapy Treatment Note/ Progress Report:       Date:  3/8/2022    Patient Name:  Fareed Pelaez    :  1980  MRN: [id number]    Pertinent Medical History:Additional Pertinent Hx: HPL, DM, OA of L knee    Medical/Treatment Diagnosis Information:  · Diagnosis: M75.82 (ICD-10-CM) - Rotator cuff tendonitis, left  · Treatment Diagnosis: Decreased strength and mobility. Insurance/Certification information:  PT Insurance Information: [facility]  Physician Information:  Referring Practitioner: Kamini Hernández MD  Plan of care signed (Y/N): sent on 22    Date of Patient follow up with Physician:      Progress Report: []  Yes  [x]  No     Date Range for reporting period:  Beginnin2022  Ending:      Progress report due (10 Rx/or 30 days whichever is less): 68     Recertification due (POC duration/ or 90 days whichever is less):      Visit # POC/Insurance Allowable Auth Needed   3 [employer] - 30 visits pcy [x]Yes    []No     Functional Outcomes Measure:   Date Assessed: at eval (22)  Test: QDASH   Score:  98%    Pain level:  10/10     History of Injury: Patient reports L shoulder pain that began 4 months ago secondary to a fall down the stairs. Xray results were negative for a fracture. MRI results indicated tendinopathy of RTC musculature. She also reports L hand pain like her hand is falling asleep. She reports NT from her elbow down to her hand. Most shoulder pain is located in deltoid insertion and along joint line. Pt reports immediate bruising after the injury. Pt denies neck pain. She reports pain on posterior-superior shoulder blade. Pt reports some popping/clicking that feels deep. She states she is at increased risk of infections secondary to the  shunt.  She has not had an injection to the L shoulder secondary to increased risk of infections. Pt was given sling, however it does not fit her properly so she has not been wearing it. She also states she is nervous and nauseous as she took her pain medication on an empty stomach. SUBJECTIVE:   2/24/22: Pt reports, \"My arm is really hurting. \" She also states her hand is bothering her, which she contributes to carpal tunnel syndrome. Pt presents with L UE in a guarded position. 3/8/22: Pt reports, \"I think something is seriously wrong with me; my brain. I am forgetting things and feelings disoriented. \" \"My arm pain is feeling worse. \" She states the numbness/tingling has gotten worse and starts above the elbow and travels down into the hand. She states she is unable to  anything with her L hand and her hand is completely numb. She states the arm pain feels like lightening pain and the arm feels heavy. She states the pain at night is a 10/10 and the pain is feeling deep in the arm/shoulder. She states she has a nerve test/study done tomorrow. She states she is exhausted and is becoming tearful. OBJECTIVE:    Observation:    Test measurements:      RESTRICTIONS/PRECAUTIONS: Pt has a  shunt on L side. Exercises/Interventions:   Therapeutic Ex (85352)  Min: 30' Resistance/Reps Cues/Notes   Scapular Retraction 2x5x3 second Poor brigette. Pendulum hang 5x10 sec Poor Brigette. - Leaning forward only, no movement   Table Slides - Flex, Scaption 2x5 each  Poor brigette. Elbow Flex AROM Poor Brigette. Unable to perform secondary to shoulder pain. Supination/Pronation AROM x10    Gripping Towel Roll x10     UE slide on railing  Poor Brigette. Pulley's - Flex  Poor Brigette.     Manual Intervention  (14416)  Min:               NMR re-education (48551)  Min:               Therapeutic Activity (66164)  Min: 10'     Pt education on PT POC, functional anatomy, PT dx, etc.           Modalities:       Cold Pack            Other Therapeutic Activities:  Pt was educated on PT POC, Diagnosis, Prognosis, pathomechanics as well as frequency and duration of scheduling future physical therapy appointments. Time was also taken on this day to answer all patient questions and participation in PT. Reviewed appointment policy in detail with patient and patient verbalized understanding. Home Exercise Program:   Scapular retraction    Therapeutic Exercise and NMR EXR  [x] (01152) Provided verbal/tactile cueing for activities related to strengthening, flexibility, endurance, ROM  for improvements in scapular, scapulothoracic and UE control with self care, reaching, carrying, lifting, house/yardwork, driving/computer work.    [] (81690) Provided verbal/tactile cueing for activities related to improving balance, coordination, kinesthetic sense, posture, motor skill, proprioception  to assist with  scapular, scapulothoracic and UE control with self care, reaching, carrying, lifting, house/yardwork, driving/computer work. Therapeutic Activities:    [x] (48429 or 18313) Provided verbal/tactile cueing for activities related to improving balance, coordination, kinesthetic sense, posture, motor skill, proprioception and motor activation to allow for proper function of scapular, scapulothoracic and UE control with self care, carrying, lifting, driving/computer work.      Home Exercise Program:    [x] (06858) Reviewed/Progressed HEP activities related to strengthening, flexibility, endurance, ROM of scapular, scapulothoracic and UE control with self care, reaching, carrying, lifting, house/yardwork, driving/computer work  [] (46023) Reviewed/Progressed HEP activities related to improving balance, coordination, kinesthetic sense, posture, motor skill, proprioception of scapular, scapulothoracic and UE control with self care, reaching, carrying, lifting, house/yardwork, driving/computer work      Manual Treatments:  PROM / STM / Oscillations-Mobs:  G-I, II, III, IV (PA's, Inf., Post.)  [x] (09034) Provided manual therapy to mobilize soft tissue/joints of cervical/CT, scapular GHJ and UE for the purpose of modulating pain, promoting relaxation,  increasing ROM, reducing/eliminating soft tissue swelling/inflammation/restriction, improving soft tissue extensibility and allowing for proper ROM for normal function with self care, reaching, carrying, lifting, house/yardwork, driving/computer work    Charges:  Timed Code Treatment Minutes: 40   Total Treatment Minutes: 45       [] EVAL (LOW) 75472 (typically 20 minutes face-to-face)  [] EVAL (MOD) 74171 (typically 30 minutes face-to-face)  [] EVAL (HIGH) 48251 (typically 45 minutes face-to-face)  [] RE-EVAL     [x] QR(02468) x 2     [] Dry needle 1 or 2 Muscles (86918)  [] NMR (09578) x     [] Dry needle 3+ Muscles (45011)  [] Manual (21728) x     [] Ultrasound (71016) x  [x] TA (73908) x  1   [] Mech Traction (15574)  [] ES(attended) (80904)     [] ES (un) (90472):   [] Vasopump (74128) [] Ionto (65111)   [] Other:    If [facility] Please Indicate Time In/Out  CPT Code Time in Time out                                   Approval Dates:  CPT Code Units Approved Units Used  Date Updated:                     GOALS:  Patient stated goal: \"To not be in pain and return back to work/do hair again. \"   []? Progressing: []? Met: []? Not Met: []? Adjusted     Therapist goals for Patient:   Short Term Goals: To be achieved in: 3 weeks  1. Independent in HEP and progression per patient tolerance, in order to prevent re-injury. []? Progressing: []? Met: []? Not Met: []? Adjusted  2. Patient will have a decrease in pain to facilitate improvement in movement, function, and ADLs as indicated by Functional Deficits. []? Progressing: []? Met: []? Not Met: []? Adjusted     Long Term Goals: To be achieved in: 6 weeks  1. Disability index score of 75% or less for the Quick DASH to assist with reaching prior level of function. []? Progressing: []? Met: []?  Not Met: []? Adjusted  2. Patient will demonstrate increased L shoulder flex/abd AROM to 120 deg to allow for proper joint functioning as indicated by Functional Deficits. []? Progressing: []? Met: []? Not Met: []? Adjusted  3. Patient will demonstrate an increase in NM recruitment/activation and overall GH and scapular strength to within n5lbs HHD or WNL for proper functional mobility as indicated by patients Functional Deficits. []? Progressing: []? Met: []? Not Met: []? Adjusted  4. Patient will return to doing heavy household chores without increased symptoms or restriction. []? Progressing: []? Met: []? Not Met: []? Adjusted       ASSESSMENT:  Pt demonstrates poor tolerance to PT follow up appt. Pt is demonstrating signs of complex regional pain syndrome leading to limitations in progressing PT treatment. PT will be on hold at this time as pt is complaining of a significant increase in pain and pt demonstrates the inability to tolerate/perform PT exercises. Treatment/Activity Tolerance:  [] Patient tolerated treatment well [] Patient limited by fatique  [x] Patient limited by pain  [] Patient limited by other medical complications  [] Other:     Overall Progression Towards Functional goals/ Treatment Progress Update:  [] Patient is progressing as expected towards functional goals listed. [] Progression is slowed due to complexities/Impairments listed. [] Progression has been slowed due to co-morbidities.   [x] Plan just implemented, too soon to assess goals progression <30days   [] Goals require adjustment due to lack of progress  [] Patient is not progressing as expected and requires additional follow up with physician  [] Other    Prognosis for POC: [x] Good [] Fair  [] Poor    Patient requires continued skilled intervention: [x] Yes  [] No       PLAN: See eval  [] Continue per plan of care [] Alter current plan (see comments)  [] Plan of care initiated [x] Hold pending MD visit [] Discharge    Electronically signed by: Evelyn Vargas PT     Note: If patient does not return for scheduled/recommended follow up visits, this note will serve as a discharge from care along with the most recent update on progress.

## 2022-03-10 ENCOUNTER — TELEPHONE (OUTPATIENT)
Dept: PRIMARY CARE CLINIC | Age: 42
End: 2022-03-10

## 2022-03-10 ENCOUNTER — APPOINTMENT (OUTPATIENT)
Dept: PHYSICAL THERAPY | Age: 42
End: 2022-03-10
Payer: COMMERCIAL

## 2022-03-10 NOTE — TELEPHONE ENCOUNTER
PT CALLED STATING THAT STRTING YESTERDAY THAT SHE WENT TO THE BATHROOM FELT LIKE SHE WAS HAVING A BM BUT WHEN  SHE LOOKED IT WAS ALL BLOOD AND BLOOD CLOTS AND WHEN SHE WIPPED IT WAS ALL BLOOD. THIS HAPPENED YESTERDAY AND TODAY. ADVISED PT THAT SHE NEEDS TO GO TO THE ER    MADE THE COVERING PROVIDER DIRK Flores.  HS

## 2022-03-16 ENCOUNTER — TELEPHONE (OUTPATIENT)
Dept: PRIMARY CARE CLINIC | Age: 42
End: 2022-03-16

## 2022-03-16 NOTE — TELEPHONE ENCOUNTER
[place]/pt called on-call provider earlier this morning/12 AM, she reports she had a colonoscopy/EGD yesterday and has been dealing with increased nausea, epigastric pain \"it feels like her belly is bloated\", having BM's, drinking coke, crackers and tried rammon noodles. She denies v/d/, bloody, black tarry stools, she is unsure of fever. Pt reports she called the GI/MD around 3 PM yesterday with the same complaints, she was instructed to take a warm bath, try to eat and stay hydrated. Pt is concerned as the s/s remain. Discussed red flags, and pt was advised to reach out the her GI provider for guidance, however also advised if s/s become emergent she is to seek emergent care. Pt has appointment with PCP/covering provider FREDRICK Li CNP, later today.

## 2022-03-16 NOTE — TELEPHONE ENCOUNTER
PT CALLED AND CANCELLED APPT.  SHE STATED THAT SHE WENT TO THE ER AND JUST GOT HOME AND WOULD LIKE TO COME IN NEXT WEEK FOR FOLLOW UP

## 2022-03-17 ENCOUNTER — TELEPHONE (OUTPATIENT)
Dept: PRIMARY CARE CLINIC | Age: 42
End: 2022-03-17

## 2022-03-17 PROBLEM — K63.89 PNEUMATOSIS OF INTESTINES: Status: ACTIVE | Noted: 2022-03-11

## 2022-03-17 PROBLEM — K52.9 COLITIS, ACUTE: Status: ACTIVE | Noted: 2022-03-11

## 2022-03-17 NOTE — TELEPHONE ENCOUNTER
Dr. Fernando Wise called to speak with me regarding pt, recent admission and recent complaints of abdominal pain, fever. He states he had some concerns regarding  shunt since she keeps having these fevers, and since colonoscopy and being treated her most recent scan in ER was normal and no abdominal cause for fevers. Discussed HPI and recent testing done with pt. Will attempt to get records from neurosurgery to determine possible next steps and workup from GI. Appreciate call and conversation with Dr. Fernando Wise and told him to reach out again if he has any questions or concerns. Please call [place] to obtain records from recent visits. Also ask for if there is a way to set up a time or correspondence with neurosurgeon to discuss plan of care.

## 2022-03-17 NOTE — TELEPHONE ENCOUNTER
Patient called on-call provider, patient recent colonoscopy on Monday and is complaining of continued abdominal pain, nausea, vomiting she reports she was evaluated in the emergency room yesterday CT was obtained and she was told that everything is fine. However patient reports she has not really been able to eat since Monday when she had some soda chicken Ramen noodles. She is reporting now a temperature of 102, she states she has tried everything the surgeons told her to do Gas-X, warm bath, activity/walking with no avail. Advised patient to call surgeon's office and/or seek emergent care if symptoms have not resolved and are worsening.

## 2022-03-18 NOTE — TELEPHONE ENCOUNTER
CALLED AND SPOKE WITH DR. TAVAREZ OFFICE AT [place] AND THEY WILL FAX OVER RECORDS AS WE ARE ALL TRYING TO GET ON THE SAME PAGE TO MAKE SURE WE ARE HELPING/TREATING THE PATIENT PROBABLY AND PROVIDING THE BEST CARE THAT WE CAN. Christine Host DR. TAVAREZ AND SPEAK WITH HIM TO DISCUSS PT AND THE ISSUES THAT SHE IS HAVING. WE ARE HOPING THAT THIS WILL CLEAR THINGS UP AND ALL OF HER CARE TEAM BE ON THE SAME PAGE IN HER TREATMENT.  HS

## 2022-03-21 ENCOUNTER — HOSPITAL ENCOUNTER (EMERGENCY)
Age: 42
Discharge: HOME | End: 2022-03-21
Attending: EMERGENCY MEDICINE
Payer: COMMERCIAL

## 2022-03-21 ENCOUNTER — TELEPHONE (OUTPATIENT)
Dept: PRIMARY CARE CLINIC | Age: 42
End: 2022-03-21

## 2022-03-21 ENCOUNTER — APPOINTMENT (OUTPATIENT)
Dept: CT IMAGING | Age: 42
End: 2022-03-21
Payer: COMMERCIAL

## 2022-03-21 VITALS
RESPIRATION RATE: 18 BRPM | OXYGEN SATURATION: 97 % | DIASTOLIC BLOOD PRESSURE: 90 MMHG | TEMPERATURE: 99 F | WEIGHT: 150 LBS | BODY MASS INDEX: 30.3 KG/M2 | SYSTOLIC BLOOD PRESSURE: 137 MMHG | HEART RATE: 97 BPM

## 2022-03-21 DIAGNOSIS — K92.2 LOWER GI BLEED: Primary | ICD-10-CM

## 2022-03-21 DIAGNOSIS — R10.13 EPIGASTRIC PAIN: ICD-10-CM

## 2022-03-21 LAB
ABO/RH: NORMAL
ALBUMIN SERPL-MCNC: 4.3 G/DL (ref 3.4–5)
ALP BLD-CCNC: 145 U/L (ref 40–129)
ALT SERPL-CCNC: 16 U/L (ref 10–40)
ANION GAP SERPL CALCULATED.3IONS-SCNC: 15 MMOL/L (ref 3–16)
ANTIBODY SCREEN: NORMAL
AST SERPL-CCNC: 9 U/L (ref 15–37)
BASOPHILS ABSOLUTE: 0.1 K/UL (ref 0–0.2)
BASOPHILS RELATIVE PERCENT: 0.5 %
BILIRUB SERPL-MCNC: 0.6 MG/DL (ref 0–1)
BILIRUBIN DIRECT: <0.2 MG/DL (ref 0–0.3)
BILIRUBIN, INDIRECT: ABNORMAL MG/DL (ref 0–1)
BUN BLDV-MCNC: 5 MG/DL (ref 7–20)
CALCIUM SERPL-MCNC: 9.5 MG/DL (ref 8.3–10.6)
CHLORIDE BLD-SCNC: 100 MMOL/L (ref 99–110)
CO2: 24 MMOL/L (ref 21–32)
CREAT SERPL-MCNC: 0.6 MG/DL (ref 0.6–1.1)
EOSINOPHILS ABSOLUTE: 0 K/UL (ref 0–0.6)
EOSINOPHILS RELATIVE PERCENT: 0 %
GFR AFRICAN AMERICAN: >60
GFR NON-AFRICAN AMERICAN: >60
GLUCOSE BLD-MCNC: 157 MG/DL (ref 70–99)
HCT VFR BLD CALC: 45.6 % (ref 36–48)
HEMOGLOBIN: 15.2 G/DL (ref 12–16)
INR BLD: 1.03 (ref 0.88–1.12)
LYMPHOCYTES ABSOLUTE: 1.3 K/UL (ref 1–5.1)
LYMPHOCYTES RELATIVE PERCENT: 10.8 %
MCH RBC QN AUTO: 28.9 PG (ref 26–34)
MCHC RBC AUTO-ENTMCNC: 33.4 G/DL (ref 31–36)
MCV RBC AUTO: 86.5 FL (ref 80–100)
MONOCYTES ABSOLUTE: 0.1 K/UL (ref 0–1.3)
MONOCYTES RELATIVE PERCENT: 1.1 %
NEUTROPHILS ABSOLUTE: 10.6 K/UL (ref 1.7–7.7)
NEUTROPHILS RELATIVE PERCENT: 87.6 %
PDW BLD-RTO: 14 % (ref 12.4–15.4)
PLATELET # BLD: 387 K/UL (ref 135–450)
PMV BLD AUTO: 7.7 FL (ref 5–10.5)
POTASSIUM REFLEX MAGNESIUM: 3.9 MMOL/L (ref 3.5–5.1)
PROTHROMBIN TIME: 11.6 SEC (ref 9.9–12.7)
RBC # BLD: 5.27 M/UL (ref 4–5.2)
SODIUM BLD-SCNC: 139 MMOL/L (ref 136–145)
TOTAL PROTEIN: 7.5 G/DL (ref 6.4–8.2)
WBC # BLD: 12.1 K/UL (ref 4–11)

## 2022-03-21 PROCEDURE — 86850 RBC ANTIBODY SCREEN: CPT

## 2022-03-21 PROCEDURE — 87040 BLOOD CULTURE FOR BACTERIA: CPT

## 2022-03-21 PROCEDURE — 74177 CT ABD & PELVIS W/CONTRAST: CPT

## 2022-03-21 PROCEDURE — 96374 THER/PROPH/DIAG INJ IV PUSH: CPT

## 2022-03-21 PROCEDURE — 36415 COLL VENOUS BLD VENIPUNCTURE: CPT

## 2022-03-21 PROCEDURE — 85025 COMPLETE CBC W/AUTO DIFF WBC: CPT

## 2022-03-21 PROCEDURE — 86900 BLOOD TYPING SEROLOGIC ABO: CPT

## 2022-03-21 PROCEDURE — 85610 PROTHROMBIN TIME: CPT

## 2022-03-21 PROCEDURE — 99281 EMR DPT VST MAYX REQ PHY/QHP: CPT

## 2022-03-21 PROCEDURE — 2580000003 HC RX 258: Performed by: EMERGENCY MEDICINE

## 2022-03-21 PROCEDURE — 86901 BLOOD TYPING SEROLOGIC RH(D): CPT

## 2022-03-21 PROCEDURE — 6360000004 HC RX CONTRAST MEDICATION: Performed by: EMERGENCY MEDICINE

## 2022-03-21 PROCEDURE — 6360000002 HC RX W HCPCS: Performed by: EMERGENCY MEDICINE

## 2022-03-21 PROCEDURE — 6370000000 HC RX 637 (ALT 250 FOR IP): Performed by: EMERGENCY MEDICINE

## 2022-03-21 PROCEDURE — 80048 BASIC METABOLIC PNL TOTAL CA: CPT

## 2022-03-21 PROCEDURE — 96375 TX/PRO/DX INJ NEW DRUG ADDON: CPT

## 2022-03-21 PROCEDURE — 80076 HEPATIC FUNCTION PANEL: CPT

## 2022-03-21 RX ORDER — PROMETHAZINE HYDROCHLORIDE 25 MG/1
25 TABLET ORAL ONCE
Status: COMPLETED | OUTPATIENT
Start: 2022-03-21 | End: 2022-03-21

## 2022-03-21 RX ORDER — DROPERIDOL 2.5 MG/ML
0.62 INJECTION, SOLUTION INTRAMUSCULAR; INTRAVENOUS EVERY 6 HOURS PRN
Status: DISCONTINUED | OUTPATIENT
Start: 2022-03-21 | End: 2022-03-21 | Stop reason: HOSPADM

## 2022-03-21 RX ORDER — 0.9 % SODIUM CHLORIDE 0.9 %
1000 INTRAVENOUS SOLUTION INTRAVENOUS ONCE
Status: COMPLETED | OUTPATIENT
Start: 2022-03-21 | End: 2022-03-21

## 2022-03-21 RX ORDER — ONDANSETRON 2 MG/ML
4 INJECTION INTRAMUSCULAR; INTRAVENOUS ONCE
Status: COMPLETED | OUTPATIENT
Start: 2022-03-21 | End: 2022-03-21

## 2022-03-21 RX ORDER — PROMETHAZINE HYDROCHLORIDE 25 MG/1
25 TABLET ORAL EVERY 6 HOURS PRN
Qty: 20 TABLET | Refills: 0 | Status: SHIPPED | OUTPATIENT
Start: 2022-03-21 | End: 2022-03-28

## 2022-03-21 RX ADMIN — SODIUM CHLORIDE 1000 ML: 9 INJECTION, SOLUTION INTRAVENOUS at 12:11

## 2022-03-21 RX ADMIN — HYDROMORPHONE HYDROCHLORIDE 1 MG: 1 INJECTION, SOLUTION INTRAMUSCULAR; INTRAVENOUS; SUBCUTANEOUS at 12:34

## 2022-03-21 RX ADMIN — IOPAMIDOL 75 ML: 755 INJECTION, SOLUTION INTRAVENOUS at 13:02

## 2022-03-21 RX ADMIN — PROMETHAZINE HYDROCHLORIDE 25 MG: 25 TABLET ORAL at 14:29

## 2022-03-21 RX ADMIN — ONDANSETRON 4 MG: 2 INJECTION INTRAMUSCULAR; INTRAVENOUS at 12:11

## 2022-03-21 ASSESSMENT — PAIN SCALES - GENERAL: PAINLEVEL_OUTOF10: 6

## 2022-03-21 NOTE — ED TRIAGE NOTES
To ED per EMS for abd pain and GI Bleed. Admitted [address] last week for this. Had a colonoscopy showed diverticulosis with polyps. Sat night abd pain started around 0100 in the lower abd. Yesterday passing dark red blood in stool with clots. Reported fever. Has been dry heaving. Denies anticoagulants. Sts abd is distended.  Pt does have a  shunt

## 2022-03-21 NOTE — ED NOTES
IV DC'd. DC inst given with 1 rx. Verbs understanding. DC'd amb in stable condition.     Pt had refused Droperidol and was given Phenergan 25 mg PO     Margaux Manuel RN  03/21/22 5039

## 2022-03-21 NOTE — TELEPHONE ENCOUNTER
Pt called on call provider, she stated she was back in the hospital a few days ago at [address] [facility] for 4 days, thoughts were she had something wrong with her bowels, post colonoscopy days before. Pt stated she had just left the ED earlier today as she was having the same s/s of abdominal pain, bloating, dark red blood, fever and elevated BP. She says she left the ED because the doctor said she had a \"jose Hemorrhoid\" She does not want to go back to the ED, as she does not want them to think she is a drug addict, so she called as she was wanting advise. I advised pt that she will need to go to the ED for evaluation, and or call the surgeon that preformed the colonoscopy. Pt stated once again that she was not going back unless it get's real bad. She would also not want to return to [facility], I once again told pt she should go for evaluation. I advised her that I would put in note and send to the covering NP, as her PCP is not in the office this week, she advised me that she does have an upcoming appointment with the covering provider on 3/24. But will call for a sooner appointment if needed, and will go back to \"a\" ED if things get worse, possibly [facility].      - I will send msg to PCP, for covering provider to review.

## 2022-03-21 NOTE — TELEPHONE ENCOUNTER
PT CALLED AND STATED THAT SHE CALLED THE ON CALL PROVIDER AND STATING THAT SHE IS SO WEEK THAT SHE CAN NOT EVEN STAND UP. THAT SHE IS STILL PASSING BLOOD CLOTS TOO BUT THAT THIS IS NOT FROM HEMRROIDS AND THAT SHE HAS HAD THEM BEFORE AND THAT THIS IS NOT WHAT HAPPENED. STATED THAT [facility] DID NOT DO MUCH FOR HER AS SHE WAS A HARD STICK AND THAT THERE LAB WAS DOWN SO NO TESTS COULD BE RAN ON HER. I ADVISED PT THAT IF SHE IS SO WEAK THAT SHE CAN NOT STAND THAT SHE NEEDS TO GO TO THE ER. PT IN AGREEMENT AND STATED THAT SHE DOES NOT KNOW WHAT ER SHE WILL GO TO. STATED POSSIBLY [facility].     MADE PROVIDER FERNANDO BURCH AWARE AND IN AGREEMENT WITH ER

## 2022-03-21 NOTE — ED PROVIDER NOTES
eMERGENCY dEPARTMENT eNCOUnter      Pt Name: Gabe Mcdowell  MRN: [id number]  [place] 1980  Date of evaluation: 3/21/2022  Provider: MD Nidia Frances       Chief Complaint   Patient presents with    Rectal Bleeding     Abd pain and rectal bleeding         HISTORY OF PRESENT ILLNESS   (Location/Symptom, Timing/Onset,Context/Setting, Quality, Duration, Modifying Factors, Severity) Note limiting factors. HPI    Gabe Mcdowell is a 46 y.o. female who presents to the emergency department with severe abdominal pain. Patient has past history of  shunt and had multiple visits to the ER for this rectal bleeding and pain. Her last visit was initially started on March 10 and was admitted to the [facility] facility with colitis and CAT scan showed some nonspecific pneumonitis. Patient had a consult with surgery on think that was anything. Patient had a follow-up colonoscopy 3 4 days later and it was essentially negative. Patient subsequently had 2-3 more ED visits. On [phone] patient complained of rectal bleeding bright red blood. All her labs were unremarkable. She also complained of some vomiting    Nursing Notes were reviewed. REVIEW OFSYSTEMS    (2+ for level 4; 10+ for level 5)   Review of Systems    General: No fevers, chills or night sweats, No weight loss    Head:  No Sore throat,  No Ear Pain    Chest:  Nontender. No Cough, No SOB,  Chest Pain    GI: Positive abdominal pain with nausea vomiting    : No dysuria or hematuria    Musculoskeletal: No unrelenting pain or night pain    Neurologic: No bowel or bladder incontinence, No saddle anesthesia, No leg weakness    All other systems reviewed and are negative.         PAST MEDICAL HISTORY     Past Medical History:   Diagnosis Date    ADHD (attention deficit hyperactivity disorder) 82    Depression with anxiety     Diabetes mellitus ([address]     pre-diabetes    Difficult intubation     Encounter for imaging to screen for metal prior to MRI 2021    MRI Conditional Medtronic Non-Programmable shunt model#73250 implanted 10/30/2020 at [facility]. Normal Mode. 1.5T or 3.0T.    ESBL (extended spectrum beta-lactamase) producing bacteria infection 2019    urine    Functional ovarian cysts 2008    rt ovary cyst x 2 yrs.     Headache(784.0)     migraines    History of blood transfusion     at birth   Rodriguez History of kidney stones     History of PCOS     Hydrocephalus ([address]     Hyperlipidemia     Irritable bowel syndrome 2004    had colonoscopy about 6 yrs ago    Meningitis     Neutrophilic leukocytosis     Nicotine dependence     PONV (postoperative nausea and vomiting)     very nauseated and sometimes wakes up with a Migraine--happened once after brain surgery    Primary osteoarthritis of left knee 2016    S/P cone biopsy of cervix     Scoliosis .s     (ventriculoperitoneal) shunt status     hydrocephalus f/w Neurosurgeon at [facility]     (ventriculoperitoneal) shunt status     Wears glasses     reading       SURGICAL HISTORY       Past Surgical History:   Procedure Laterality Date    ABDOMEN SURGERY N/A 2021    REMOVAL OF ABDOMINAL WALL MASS performed by Yue León MD at [address] [place] [address]      appendicitis     SECTION  2005    placenta previa    CHOLECYSTECTOMY      COLONOSCOPY  2004    COLPOSCOPY      CSF SHUNT      replaced at age 17   Rodriguez CYSTOSCOPY      stone removal    HEMORRHOID SURGERY      HERNIA REPAIR Bilateral     inguinal    HERNIA REPAIR  2015    hiatal hernia    HYSTERECTOMY, TOTAL ABDOMINAL  2016    TAHBSO, adhesions/PCOS    KNEE ARTHROSCOPY Left 2015    medial meniscectomy, chondroplasty, plica resection    LITHOTRIPSY Bilateral 12/10/2019    OTHER SURGICAL HISTORY  10/19/2016    op lap    VENTRICULOPERITONEAL SHUNT      multiple revisions, most recent        CURRENT MEDICATIONS       Previous Medications    CARBAMIDE PEROXIDE (DEBROX) 6.5 % OTIC SOLUTION    Place 5 drops into both ears 2 times daily    HYDROCODONE-ACETAMINOPHEN (NORCO) 7.5-325 MG PER TABLET    TAKE 1 TABLET BY MOUTH 2 TIMES A DAY AS NEEDED FOR PAIN FOR UP TO 30 DAYS. INDICATIONS: PAIN    PREDNISONE (DELTASONE) 10 MG TABLET    Take 10 mg by mouth See Admin Instructions    SIMVASTATIN (ZOCOR) 10 MG TABLET    Take 1 tablet by mouth nightly    VYVANSE 50 MG CAPSULE    Take 1 capsule by mouth every morning for 30 days. Take 50 mg by mouth every morning.        ALLERGIES     Bee venom, Bentyl [dicyclomine hcl], Dicyclomine, Ketorolac tromethamine, Levofloxacin, [place], Mushroom extract complex, Oxycodone-acetaminophen, Sulfa antibiotics, Vancomycin, Adhesive tape, Dicyclomine hcl, Haldol [haloperidol], Sulfacetamide, Acetaminophen, Butalbital-apap-caff-cod, Ceftaroline, Daptomycin, Fosfomycin tromethamine, Ketamine, Methocarbamol, Prochlorperazine, Reglan [metoclopramide], Silicone, Tazobactam, and Zosyn [piperacillin sod-tazobactam so]    FAMILY HISTORY       Family History   Problem Relation Age of Onset    High Blood Pressure Mother     Diabetes Mother     High Cholesterol Mother     Depression Mother     Diabetes Maternal Grandmother     High Blood Pressure Maternal Grandmother     High Cholesterol Maternal Grandmother     Heart Disease Maternal Grandfather     High Blood Pressure Maternal Grandfather     Heart Disease Paternal Grandmother     Stroke Paternal Grandfather     Depression Sister     Cirrhosis Father     Rheum Arthritis Neg Hx     Osteoarthritis Neg Hx     Asthma Neg Hx     Breast Cancer Neg Hx     Cancer Neg Hx     Heart Failure Neg Hx     Hypertension Neg Hx     Migraines Neg Hx     Ovarian Cancer Neg Hx     Rashes/Skin Problems Neg Hx     Seizures Neg Hx     Thyroid Disease Neg Hx         SOCIAL HISTORY       Social History     Socioeconomic History    Marital status: Single     Spouse name: Not on file    Number of children: Not on file    Years of education: Not on file    Highest education level: Not on file   Occupational History    Occupation: disability, SSI    Tobacco Use    Smoking status: Current Every Day Smoker     Packs/day: 0.50     Years: 18.00     Pack years: 9.00     Types: Cigarettes    Smokeless tobacco: Never Used   Vaping Use    Vaping Use: Never used   Substance and Sexual Activity    Alcohol use: No     Alcohol/week: 0.0 standard drinks    Drug use: Never    Sexual activity: Not Currently     Partners: Male   Other Topics Concern    Not on file   Social History Narrative    Not on file     Social Determinants of Health     Financial Resource Strain: Low Risk     Difficulty of Paying Living Expenses: Not hard at all   Food Insecurity: No Food Insecurity    Worried About Running Out of Food in the Last Year: Never true    [place] of Food in the Last Year: Never true   Transportation Needs:     Lack of Transportation (Medical): Not on file    Lack of Transportation (Non-Medical):  Not on file   Physical Activity:     Days of Exercise per Week: Not on file    Minutes of Exercise per Session: Not on file   Stress:     Feeling of Stress : Not on file   Social Connections:     Frequency of Communication with Friends and Family: Not on file    Frequency of Social Gatherings with Friends and Family: Not on file    Attends Gnosticism Services: Not on file    Active Member of [address] [place] [address] or Organizations: Not on file    Attends Club or Organization Meetings: Not on file    Marital Status: Not on file   Intimate Partner Violence:     Fear of Current or Ex-Partner: Not on file    Emotionally Abused: Not on file    Physically Abused: Not on file    Sexually Abused: Not on file   Housing Stability:     Unable to Pay for Housing in the Last Year: Not on file    Number of [place] in the Last Year: Not on file    Unstable Housing in the Last Year: Not on file       SCREENINGS PHYSICAL EXAM    (up to 7 for level 4, 8 or more for level 5)     ED Triage Vitals [03/21/22 1110]   BP Temp Temp Source Pulse Resp SpO2 Height Weight   (!) 162/100 99 °F (37.2 °C) Oral 133 14 98 % -- 150 lb (68 kg)       Physical Exam    General: Alert and awake ×3. Nontoxic appearance. Well-developed well-nourished 72-year-old in moderate distress  HEENT: Normocephalic atraumatic. Neck is supple. Airway intact. No adenopathy. There is no head pain. No swelling I can tell. No cellulitis. Cardiac: Regular rate and rhythm with no murmurs rubs or gallops  Pulmonary: Lungs are clear in all lung fields. No wheezing. No Rales. Abdomen: Soft and with diffuse tenderness over her body. Negative hepatosplenomegaly. Bowel sounds are active  Extremities: Moving all extremities. No calf tenderness. Peripheral pulses all intact  Skin: No skin lesions. No rashes  Neurologic: Cranial nerves II through XII was grossly intact. Nonfocal neurological exam  Psychiatric: Patient is pleasant. Mood is appropriate. DIAGNOSTIC RESULTS     EKG (Per Emergency Physician):       RADIOLOGY (Per Emergency Physician): Interpretation per the Radiologist below, if available at the time of this note:  CT ABDOMEN PELVIS W IV CONTRAST Additional Contrast? None    Result Date: 3/21/2022  EXAMINATION: CT OF THE ABDOMEN AND PELVIS WITH CONTRAST 3/21/2022 1:02 pm TECHNIQUE: CT of the abdomen and pelvis was performed with the administration of intravenous contrast. Multiplanar reformatted images are provided for review. Dose modulation, iterative reconstruction, and/or weight based adjustment of the mA/kV was utilized to reduce the radiation dose to as low as reasonably achievable.  COMPARISON: 12/12/2021 HISTORY: ORDERING SYSTEM PROVIDED HISTORY: abd pain TECHNOLOGIST PROVIDED HISTORY: Additional Contrast?->None Reason for exam:->abd pain Decision Support Exception - unselect if not a suspected or confirmed TempSrc: Oral    SpO2: 98% 98%   Weight: 150 lb (68 kg)        Medications   droperidol (INAPSINE) injection 0.625 mg (has no administration in time range)   promethazine (PHENERGAN) tablet 25 mg (has no administration in time range)   0.9 % sodium chloride bolus (1,000 mLs IntraVENous New Bag 3/21/22 1211)   ondansetron (ZOFRAN) injection 4 mg (4 mg IntraVENous Given 3/21/22 1211)   HYDROmorphone (DILAUDID) injection 1 mg (1 mg IntraVENous Given 3/21/22 1234)   iopamidol (ISOVUE-370) 76 % injection 75 mL (75 mLs IntraVENous Given 3/21/22 1302)       [facility]. Patient is a 73-year-old with abdominal pain with multiple ED visits and hospitalization. Patient is already in pain management. Patient does have a  shunt patient is on narcotics at home already. Patient was requesting pain meds. Did give her some Dilaudid pending her results. I have surprised that all her last test came back negative the only thing abnormal was the white count of 12. It could be demargination. See any signs of inflammation her vital signs were normal.  Initially her pulse was 130 now down to the low 100s. A lot of times I talked her rate will go up. She was given Zofran as well wanted something else I gave her and Inapsine but patient refused for that Phenergan I will give her Phenergan p.o. and prescription for that patient will need to follow-up with her [facility] [place] physicians    REVAL:         CRITICAL CARE TIME   Total CriticalCare time was 0 minutes, excluding separately reportable procedures. There was a high probability of clinically significant/life threatening deterioration in the patient's condition which required my urgent intervention. CONSULTS:  None    PROCEDURES:  Unless otherwise noted below, none     [unfilled]    FINAL IMPRESSION      1. Lower GI bleed    2.  Epigastric pain          DISPOSITION/PLAN   DISPOSITION Decision To Discharge 03/21/2022 02:06:16 PM      PATIENT REFERRED TO:  Gastroenterologist    Schedule an appointment as soon as possible for a visit in 3 days  If symptoms worsen      DISCHARGE MEDICATIONS:  New Prescriptions    PROMETHAZINE (PHENERGAN) 25 MG TABLET    Take 1 tablet by mouth every 6 hours as needed for Nausea WARNING:  May cause drowsiness. May impair ability to operate vehicles or machinery. Do not use in combination with alcohol. (Please note:  Portions of this note were completed with a voice recognition program.Efforts were made to edit the dictations but occasionally words and phrases are mis-transcribed.)  Form v2016. J.5-cn    EDMUNDO VELAZQUEZ MD (electronically signed)  Emergency Medicine Provider        Brendalyn Brunner, MD  03/21/22 6960

## 2022-03-24 ENCOUNTER — TELEPHONE (OUTPATIENT)
Dept: ORTHOPEDIC SURGERY | Age: 42
End: 2022-03-24

## 2022-03-25 LAB
BLOOD CULTURE, ROUTINE: NORMAL
CULTURE, BLOOD 2: NORMAL

## 2022-03-29 ENCOUNTER — OFFICE VISIT (OUTPATIENT)
Dept: ORTHOPEDIC SURGERY | Age: 42
End: 2022-03-29
Payer: COMMERCIAL

## 2022-03-29 ENCOUNTER — OFFICE VISIT (OUTPATIENT)
Dept: GYNECOLOGY | Age: 42
End: 2022-03-29
Payer: COMMERCIAL

## 2022-03-29 VITALS — HEIGHT: 59 IN | WEIGHT: 158 LBS | BODY MASS INDEX: 31.85 KG/M2

## 2022-03-29 VITALS
BODY MASS INDEX: 30.3 KG/M2 | WEIGHT: 150 LBS | DIASTOLIC BLOOD PRESSURE: 90 MMHG | SYSTOLIC BLOOD PRESSURE: 127 MMHG | HEART RATE: 99 BPM | OXYGEN SATURATION: 100 %

## 2022-03-29 DIAGNOSIS — R20.0 NUMBNESS AND TINGLING OF LEFT UPPER EXTREMITY: ICD-10-CM

## 2022-03-29 DIAGNOSIS — R87.810 ASCUS WITH POSITIVE HIGH RISK HPV CERVICAL: Primary | ICD-10-CM

## 2022-03-29 DIAGNOSIS — R20.2 NUMBNESS AND TINGLING OF LEFT UPPER EXTREMITY: ICD-10-CM

## 2022-03-29 DIAGNOSIS — M19.012 ARTHRITIS OF LEFT ACROMIOCLAVICULAR JOINT: ICD-10-CM

## 2022-03-29 DIAGNOSIS — M75.82 ROTATOR CUFF TENDONITIS, LEFT: Primary | ICD-10-CM

## 2022-03-29 DIAGNOSIS — R87.610 ASCUS WITH POSITIVE HIGH RISK HPV CERVICAL: Primary | ICD-10-CM

## 2022-03-29 PROCEDURE — G8417 CALC BMI ABV UP PARAM F/U: HCPCS | Performed by: OBSTETRICS & GYNECOLOGY

## 2022-03-29 PROCEDURE — G8427 DOCREV CUR MEDS BY ELIG CLIN: HCPCS | Performed by: ORTHOPAEDIC SURGERY

## 2022-03-29 PROCEDURE — 99214 OFFICE O/P EST MOD 30 MIN: CPT | Performed by: ORTHOPAEDIC SURGERY

## 2022-03-29 PROCEDURE — G8484 FLU IMMUNIZE NO ADMIN: HCPCS | Performed by: ORTHOPAEDIC SURGERY

## 2022-03-29 PROCEDURE — G8417 CALC BMI ABV UP PARAM F/U: HCPCS | Performed by: ORTHOPAEDIC SURGERY

## 2022-03-29 PROCEDURE — 99213 OFFICE O/P EST LOW 20 MIN: CPT | Performed by: OBSTETRICS & GYNECOLOGY

## 2022-03-29 PROCEDURE — G8427 DOCREV CUR MEDS BY ELIG CLIN: HCPCS | Performed by: OBSTETRICS & GYNECOLOGY

## 2022-03-29 PROCEDURE — G8484 FLU IMMUNIZE NO ADMIN: HCPCS | Performed by: OBSTETRICS & GYNECOLOGY

## 2022-03-29 PROCEDURE — 4004F PT TOBACCO SCREEN RCVD TLK: CPT | Performed by: ORTHOPAEDIC SURGERY

## 2022-03-29 PROCEDURE — 4004F PT TOBACCO SCREEN RCVD TLK: CPT | Performed by: OBSTETRICS & GYNECOLOGY

## 2022-03-29 RX ORDER — METHYLPREDNISOLONE 4 MG/1
TABLET ORAL
Qty: 1 KIT | Refills: 0 | Status: SHIPPED | OUTPATIENT
Start: 2022-03-29 | End: 2022-04-04

## 2022-03-29 NOTE — PROGRESS NOTES
Gabe Mcdowell  [id number]  March 29, 2022    Chief Complaint   Patient presents with    Follow-up     Left shoulder        History: The patient is a 16-year-old female who is here for evaluation of her left shoulder. The patient states that the Medrol Dosepak did not help her symptoms. The patient reportedly was watching the Super Bowl on Sunday when she went to grab some dip. She dropped the dip on the floor due to severe numbness in her left hand and arm. The numbness has remained. The patient now states that she has been released from the neurosurgical team at [facility]. She rates the pain as 9/10. She is no longer taking Norco.  She is now on oxycodone. She is right-hand dominant. She is currently in pain management at [facility].  She has difficulty raising her arm. She has no prior history of left shoulder issues. I did perform a left knee arthroscopy in the remote past and she healed uneventfully. The patient does have an extensive medical history including history of congenital hydrocephalus. The patient does have a  shunt. She did have to have a recent surgery down in [place] for the shunt. She temporarily moved to [place]. The patient does take gabapentin 100 mg 3 times a day. This is prescribed by her pain management physician. The patient's  past medical history, medications, allergies,  family history, social history, and have been reviewed, and dated and are recorded in the chart. Pertinent items are noted in HPI. Review of systems reviewed from Pertinent History Form dated on 1/25/22 and available in the patient's chart under the Media tab. Vitals:  Ht 4' 11\" (1.499 m)   Wt 158 lb (71.7 kg)   LMP 10/31/2016 (Exact Date)   BMI 31.91 kg/m²     Physical: Physical: The patient presents today in no acute distress. She is alert and oriented to person, place and time. She displays appropriate mood and affect. She is well dressed, nourished and  groomed. She has a normal affect. Examination of the neck reveals no evidence of tenderness. Spurling's sign is negative. Active range of motion of the left shoulder is: 160 degrees abduction, 165 degrees forward flexion, 40 degrees of external rotation and internal rotation to L4. Passive range of motion of the left shoulder is: 165 degrees abduction, 165 degrees forward flexion, 50 degrees of external rotation and internal rotation to L2. Active and passive range of motion of the opposite shoulder is full. Examination of the left shoulder reveals positive Neer and Green' impingement signs. There is mild subacromial crepitus with range of motion. Drop arm test is negative on the left. Speed's test is negative. There is no evidence of tenderness over the Bicipital groove. She  does have mild tenderness over the [facility] joint. Cross body adduction test is positive. She has moderate tenderness over the subacromial space. There is no evidence of scapular winging. Lift off sign is negative. There is no evidence of atrophy. External rotation strength is 5/5 on the left. There are no skin lesions, cellulitis, or extreme edema in the upper extremities. Sensation is subjectively decreased in the median nerve distribution. The patient has warm and well-perfused bilateral upper extremities with brisk capillary refill. Radial and ulnar pulses are palpable and 2+ bilaterally. Carpal compression test on the left was slightly positive    EMG/nerve conduction studies of the left upper extremity were extensively reviewed. The EMG/nerve conduction study was normal.  There was no evidence of radiculopathy, peripheral neuropathy, carpal tunnel syndrome or ulnar nerve compression. Impression: Left shoulder rotator cuff tendonitis #2 left shoulder AC joint arthritis #3 left arm numbness    Plan: At this time, I am not entirely certain of the exact cause of her numbness.   The EMG/nerve conduction studies were normal.  The patient may continue taking her gabapentin per her pain management specialist.  We did encourage the patient to follow-up with a neurosurgeon regarding her shunt. The patient was given a Medrol Dosepak. She was encouraged to modify her activities. She will follow-up with me in 4 weeks and we will reassess her then.

## 2022-03-29 NOTE — PROGRESS NOTES
Subjective:      Patient ID: Jael Jones is a 46 y.o. female. HPI  pts here for f/u pap from lgsil. Denies complaints. Dealing with reaction to latex in her shunt. Review of Systems Pertinent review of systems items discussed above. All others systems items not discussed above were negative. Objective:   Physical Exam    Af, vss  Ext- no lesions  Meatus- no lesions  Bladder- good support  Vag- no d/c, cuff without lesions  cx- absent  Pap    Assessment:   H/o lgsil     Plan:   Call with results , f/u pap in 4 months.        Emeli Cotter MD

## 2022-04-04 DIAGNOSIS — F90.9 ATTENTION DEFICIT HYPERACTIVITY DISORDER (ADHD), UNSPECIFIED ADHD TYPE: ICD-10-CM

## 2022-04-04 RX ORDER — LISDEXAMFETAMINE DIMESYLATE 50 MG
50 CAPSULE ORAL EVERY MORNING
Qty: 30 CAPSULE | Refills: 0 | Status: SHIPPED | OUTPATIENT
Start: 2022-04-04 | End: 2022-05-04 | Stop reason: SDUPTHER

## 2022-04-06 ENCOUNTER — TELEPHONE (OUTPATIENT)
Dept: PRIMARY CARE CLINIC | Age: 42
End: 2022-04-06

## 2022-04-06 NOTE — PROGRESS NOTES
Patient called on-call provider at 0300. This provider is familiar with her history. Patient reports having increased pressure in her head and neck, not her typical pain. Her medications are not relieving her pain. She is unable to take other meds at this time due to recent bowel issues. She reported having a fever from 100.9-102+ this evening/early morning. She has also had bowel issues, blood clots from her rectum again this week. She stated that she passed out tonight as well. This provider encouraged her to seek care at this time due to history.

## 2022-04-07 ENCOUNTER — HOSPITAL ENCOUNTER (EMERGENCY)
Age: 42
Discharge: HOME | End: 2022-04-07
Attending: EMERGENCY MEDICINE
Payer: COMMERCIAL

## 2022-04-07 ENCOUNTER — APPOINTMENT (OUTPATIENT)
Dept: CT IMAGING | Age: 42
End: 2022-04-07
Payer: COMMERCIAL

## 2022-04-07 ENCOUNTER — APPOINTMENT (OUTPATIENT)
Dept: GENERAL RADIOLOGY | Age: 42
End: 2022-04-07
Payer: COMMERCIAL

## 2022-04-07 VITALS
SYSTOLIC BLOOD PRESSURE: 134 MMHG | TEMPERATURE: 98.6 F | RESPIRATION RATE: 16 BRPM | BODY MASS INDEX: 31.11 KG/M2 | HEIGHT: 59 IN | DIASTOLIC BLOOD PRESSURE: 66 MMHG | OXYGEN SATURATION: 100 % | HEART RATE: 93 BPM | WEIGHT: 154.32 LBS

## 2022-04-07 DIAGNOSIS — N30.00 ACUTE CYSTITIS WITHOUT HEMATURIA: ICD-10-CM

## 2022-04-07 DIAGNOSIS — R11.0 NAUSEA: Primary | ICD-10-CM

## 2022-04-07 LAB
A/G RATIO: 1.8 (ref 1.1–2.2)
ALBUMIN SERPL-MCNC: 4.7 G/DL (ref 3.4–5)
ALP BLD-CCNC: 124 U/L (ref 40–129)
ALT SERPL-CCNC: 14 U/L (ref 10–40)
ANION GAP SERPL CALCULATED.3IONS-SCNC: 18 MMOL/L (ref 3–16)
AST SERPL-CCNC: 9 U/L (ref 15–37)
BACTERIA: ABNORMAL /HPF
BASOPHILS ABSOLUTE: 0 K/UL (ref 0–0.2)
BASOPHILS RELATIVE PERCENT: 0.2 %
BILIRUB SERPL-MCNC: 0.4 MG/DL (ref 0–1)
BILIRUBIN URINE: NEGATIVE
BLOOD, URINE: NEGATIVE
BUN BLDV-MCNC: 15 MG/DL (ref 7–20)
CALCIUM SERPL-MCNC: 9.7 MG/DL (ref 8.3–10.6)
CHLORIDE BLD-SCNC: 104 MMOL/L (ref 99–110)
CLARITY: CLEAR
CO2: 20 MMOL/L (ref 21–32)
COLOR: ABNORMAL
CREAT SERPL-MCNC: 0.6 MG/DL (ref 0.6–1.1)
EOSINOPHILS ABSOLUTE: 0 K/UL (ref 0–0.6)
EOSINOPHILS RELATIVE PERCENT: 0 %
EPITHELIAL CELLS, UA: 14 /HPF (ref 0–5)
GFR AFRICAN AMERICAN: >60
GFR NON-AFRICAN AMERICAN: >60
GLUCOSE BLD-MCNC: 131 MG/DL (ref 70–99)
GLUCOSE URINE: NEGATIVE MG/DL
HCT VFR BLD CALC: 44.5 % (ref 36–48)
HEMOGLOBIN: 15 G/DL (ref 12–16)
HYALINE CASTS: 4 /LPF (ref 0–8)
KETONES, URINE: NEGATIVE MG/DL
LACTIC ACID: 1 MMOL/L (ref 0.4–2)
LEUKOCYTE ESTERASE, URINE: ABNORMAL
LIPASE: 20 U/L (ref 13–60)
LYMPHOCYTES ABSOLUTE: 1.5 K/UL (ref 1–5.1)
LYMPHOCYTES RELATIVE PERCENT: 11.4 %
MCH RBC QN AUTO: 29.7 PG (ref 26–34)
MCHC RBC AUTO-ENTMCNC: 33.7 G/DL (ref 31–36)
MCV RBC AUTO: 88.2 FL (ref 80–100)
MICROSCOPIC EXAMINATION: YES
MONOCYTES ABSOLUTE: 0.3 K/UL (ref 0–1.3)
MONOCYTES RELATIVE PERCENT: 2.1 %
NEUTROPHILS ABSOLUTE: 11.3 K/UL (ref 1.7–7.7)
NEUTROPHILS RELATIVE PERCENT: 86.3 %
NITRITE, URINE: NEGATIVE
PDW BLD-RTO: 13.7 % (ref 12.4–15.4)
PH UA: 6 (ref 5–8)
PLATELET # BLD: 349 K/UL (ref 135–450)
PMV BLD AUTO: 7.5 FL (ref 5–10.5)
POTASSIUM SERPL-SCNC: 4.2 MMOL/L (ref 3.5–5.1)
PROTEIN UA: NEGATIVE MG/DL
RBC # BLD: 5.04 M/UL (ref 4–5.2)
RBC UA: 1 /HPF (ref 0–4)
SODIUM BLD-SCNC: 142 MMOL/L (ref 136–145)
SPECIFIC GRAVITY UA: 1.02 (ref 1–1.03)
TOTAL PROTEIN: 7.3 G/DL (ref 6.4–8.2)
TROPONIN: <0.01 NG/ML
URINE REFLEX TO CULTURE: YES
URINE TYPE: ABNORMAL
UROBILINOGEN, URINE: 0.2 E.U./DL
WBC # BLD: 13.1 K/UL (ref 4–11)
WBC UA: 11 /HPF (ref 0–5)

## 2022-04-07 PROCEDURE — 85025 COMPLETE CBC W/AUTO DIFF WBC: CPT

## 2022-04-07 PROCEDURE — 87086 URINE CULTURE/COLONY COUNT: CPT

## 2022-04-07 PROCEDURE — 81001 URINALYSIS AUTO W/SCOPE: CPT

## 2022-04-07 PROCEDURE — 83690 ASSAY OF LIPASE: CPT

## 2022-04-07 PROCEDURE — 80053 COMPREHEN METABOLIC PANEL: CPT

## 2022-04-07 PROCEDURE — 6360000002 HC RX W HCPCS: Performed by: EMERGENCY MEDICINE

## 2022-04-07 PROCEDURE — 6360000004 HC RX CONTRAST MEDICATION: Performed by: EMERGENCY MEDICINE

## 2022-04-07 PROCEDURE — 93005 ELECTROCARDIOGRAM TRACING: CPT | Performed by: EMERGENCY MEDICINE

## 2022-04-07 PROCEDURE — 71045 X-RAY EXAM CHEST 1 VIEW: CPT

## 2022-04-07 PROCEDURE — 84484 ASSAY OF TROPONIN QUANT: CPT

## 2022-04-07 PROCEDURE — 83605 ASSAY OF LACTIC ACID: CPT

## 2022-04-07 PROCEDURE — 99285 EMERGENCY DEPT VISIT HI MDM: CPT

## 2022-04-07 PROCEDURE — 96374 THER/PROPH/DIAG INJ IV PUSH: CPT

## 2022-04-07 PROCEDURE — 74176 CT ABD & PELVIS W/O CONTRAST: CPT

## 2022-04-07 PROCEDURE — 36415 COLL VENOUS BLD VENIPUNCTURE: CPT

## 2022-04-07 PROCEDURE — 6370000000 HC RX 637 (ALT 250 FOR IP): Performed by: EMERGENCY MEDICINE

## 2022-04-07 RX ORDER — CEFDINIR 300 MG/1
300 CAPSULE ORAL 2 TIMES DAILY
Qty: 10 CAPSULE | Refills: 0 | Status: SHIPPED | OUTPATIENT
Start: 2022-04-07 | End: 2022-04-12

## 2022-04-07 RX ORDER — NITROFURANTOIN 25; 75 MG/1; MG/1
100 CAPSULE ORAL ONCE
Status: DISCONTINUED | OUTPATIENT
Start: 2022-04-07 | End: 2022-04-07

## 2022-04-07 RX ORDER — PROMETHAZINE HYDROCHLORIDE 12.5 MG/1
12.5 TABLET ORAL 3 TIMES DAILY PRN
Qty: 8 TABLET | Refills: 0 | Status: SHIPPED | OUTPATIENT
Start: 2022-04-07 | End: 2022-07-27 | Stop reason: SDUPTHER

## 2022-04-07 RX ORDER — CEFDINIR 300 MG/1
300 CAPSULE ORAL EVERY 12 HOURS SCHEDULED
Status: DISCONTINUED | OUTPATIENT
Start: 2022-04-07 | End: 2022-04-07 | Stop reason: HOSPADM

## 2022-04-07 RX ORDER — PROMETHAZINE HYDROCHLORIDE 25 MG/1
25 TABLET ORAL EVERY 6 HOURS PRN
Status: DISCONTINUED | OUTPATIENT
Start: 2022-04-07 | End: 2022-04-07 | Stop reason: HOSPADM

## 2022-04-07 RX ORDER — OXYCODONE HYDROCHLORIDE 5 MG/1
5 TABLET ORAL ONCE
Status: COMPLETED | OUTPATIENT
Start: 2022-04-07 | End: 2022-04-07

## 2022-04-07 RX ORDER — MORPHINE SULFATE 4 MG/ML
4 INJECTION, SOLUTION INTRAMUSCULAR; INTRAVENOUS ONCE
Status: COMPLETED | OUTPATIENT
Start: 2022-04-07 | End: 2022-04-07

## 2022-04-07 RX ORDER — OXYCODONE HYDROCHLORIDE AND ACETAMINOPHEN 5; 325 MG/1; MG/1
1 TABLET ORAL ONCE
Status: DISCONTINUED | OUTPATIENT
Start: 2022-04-07 | End: 2022-04-07

## 2022-04-07 RX ORDER — HYDROCODONE BITARTRATE AND ACETAMINOPHEN 5; 325 MG/1; MG/1
1 TABLET ORAL ONCE
Status: DISCONTINUED | OUTPATIENT
Start: 2022-04-07 | End: 2022-04-07

## 2022-04-07 RX ADMIN — OXYCODONE 5 MG: 5 TABLET ORAL at 15:50

## 2022-04-07 RX ADMIN — IOPAMIDOL 75 ML: 755 INJECTION, SOLUTION INTRAVENOUS at 14:47

## 2022-04-07 RX ADMIN — PROMETHAZINE HYDROCHLORIDE 25 MG: 25 TABLET ORAL at 16:06

## 2022-04-07 RX ADMIN — MORPHINE SULFATE 4 MG: 4 INJECTION, SOLUTION INTRAMUSCULAR; INTRAVENOUS at 13:57

## 2022-04-07 ASSESSMENT — PAIN DESCRIPTION - LOCATION
LOCATION: ABDOMEN;FLANK
LOCATION: FLANK;ABDOMEN

## 2022-04-07 ASSESSMENT — PAIN SCALES - GENERAL
PAINLEVEL_OUTOF10: 10

## 2022-04-07 ASSESSMENT — PAIN DESCRIPTION - PAIN TYPE
TYPE: ACUTE PAIN

## 2022-04-07 ASSESSMENT — PAIN DESCRIPTION - ORIENTATION
ORIENTATION: RIGHT;LEFT;MID

## 2022-04-07 ASSESSMENT — PAIN DESCRIPTION - DESCRIPTORS: DESCRIPTORS: SHARP;SHOOTING

## 2022-04-07 ASSESSMENT — PAIN DESCRIPTION - PROGRESSION: CLINICAL_PROGRESSION: NOT CHANGED

## 2022-04-07 ASSESSMENT — PAIN DESCRIPTION - FREQUENCY: FREQUENCY: CONTINUOUS

## 2022-04-07 ASSESSMENT — PAIN - FUNCTIONAL ASSESSMENT: PAIN_FUNCTIONAL_ASSESSMENT: 0-10

## 2022-04-07 NOTE — Clinical Note
Masood Driscoll was seen and treated in our emergency department on 4/7/2022. She may return to work on 04/09/2022. If you have any questions or concerns, please don't hesitate to call.       Sky Gilliland MD

## 2022-04-07 NOTE — ED TRIAGE NOTES
Pt arrives ambulatory for eval of known bilat kidney stones. Pt sts she has passes some stones recently. Pt sts she is unable to keep oral medication down. Pt sts was at [facility] prior to coming here but left ama. Pt sts unable to urinate since yesterday. Pt is a/ox4, resp nonlabored and pwd.

## 2022-04-07 NOTE — ED PROVIDER NOTES
[facility]      CHIEF COMPLAINT  Nephrolithiasis (Pt arrives ambulatory for eval of known bilat kidney stones. )       HISTORY OF PRESENT ILLNESS  Amanda Cabrallo is a 46 y.o. female with history of diabetes, PCOS, hyperlipidemia, who presents to the emergency department for evaluation of bilateral flank pain, abdominal pain, and feeling bloated. Patient reports having pain for the last few weeks. Says she has been passing what feels like sand. She is having 10 out of 10 pain. Says she feels bloated. Having suprapubic pain . No dysuria, urinary urgency or frequency. No hematuria. + nausea. No vomiting. No fevers. No other complaints, modifying factors or associated symptoms. I have reviewed the following from the nursing documentation. Past Medical History:   Diagnosis Date    ADHD (attention deficit hyperactivity disorder) 82    Depression with anxiety     Diabetes mellitus ([address]     pre-diabetes    Difficult intubation     Encounter for imaging to screen for metal prior to MRI 06/01/2021    MRI Conditional Medtronic Non-Programmable shunt model#69768 implanted 10/30/2020 at [facility]. Normal Mode. 1.5T or 3.0T.    ESBL (extended spectrum beta-lactamase) producing bacteria infection 11/06/2019    urine    Functional ovarian cysts 2008    rt ovary cyst x 2 yrs.     Headache(784.0)     migraines    History of blood transfusion     at birth   Rodriguez History of kidney stones     History of PCOS     Hydrocephalus ([address]     Hyperlipidemia     Irritable bowel syndrome 2004    had colonoscopy about 6 yrs ago    Meningitis 94/2225    Neutrophilic leukocytosis     Nicotine dependence     PONV (postoperative nausea and vomiting)     very nauseated and sometimes wakes up with a Migraine--happened once after brain surgery    Primary osteoarthritis of left knee 07/01/2016    S/P cone biopsy of cervix 2004    Scoliosis 1990.s     (ventriculoperitoneal) shunt status 1982    hydrocephalus f/w Neurosurgeon at [place] [address]  (ventriculoperitoneal) shunt status     Wears glasses     reading     Past Surgical History:   Procedure Laterality Date    ABDOMEN SURGERY N/A 2021    REMOVAL OF ABDOMINAL WALL MASS performed [facility]      appendicitis     SECTION  2005    placenta previa    CHOLECYSTECTOMY      COLONOSCOPY      COLPOSCOPY      CSF SHUNT      replaced at age 17    CYSTOSCOPY      stone removal    HEMORRHOID SURGERY      HERNIA REPAIR Bilateral     inguinal    HERNIA REPAIR      hiatal hernia    HYSTERECTOMY, TOTAL ABDOMINAL  2016    TAHBSO, adhesions/PCOS    KNEE ARTHROSCOPY Left 2015    medial meniscectomy, chondroplasty, plica resection    LITHOTRIPSY Bilateral 12/10/2019    OTHER SURGICAL HISTORY  10/19/2016    op lap    VENTRICULOPERITONEAL SHUNT      multiple revisions, most recent      Family History   Problem Relation Age of Onset    High Blood Pressure Mother     Diabetes Mother     High Cholesterol Mother     Depression Mother     Diabetes Maternal Grandmother     High Blood Pressure Maternal Grandmother     High Cholesterol Maternal Grandmother     Heart Disease Maternal Grandfather     High Blood Pressure Maternal Grandfather     Heart Disease Paternal Grandmother     Stroke Paternal Grandfather     Depression Sister     Cirrhosis Father     Rheum Arthritis Neg Hx     Osteoarthritis Neg Hx     Asthma Neg Hx     Breast Cancer Neg Hx     Cancer Neg Hx     Heart Failure Neg Hx     Hypertension Neg Hx     Migraines Neg Hx     Ovarian Cancer Neg Hx     Rashes/Skin Problems Neg Hx     Seizures Neg Hx     Thyroid Disease Neg Hx      Social History     Socioeconomic History    Marital status: Single     Spouse name: Not on file    Number of children: Not on file    Years of education: Not on file    Highest education level: Not on file   Occupational History    Occupation: disability, SSI    Tobacco Use    Smoking status: Current Every Day Smoker     Packs/day: 0.50     Years: 18.00     Pack years: 9.00     Types: Cigarettes    Smokeless tobacco: Never Used   Vaping Use    Vaping Use: Never used   Substance and Sexual Activity    Alcohol use: No     Alcohol/week: 0.0 standard drinks    Drug use: Never    Sexual activity: Not Currently     Partners: Male   Other Topics Concern    Not on file   Social History Narrative    Not on file     Social Determinants of Health     Financial Resource Strain: Low Risk     Difficulty of Paying Living Expenses: Not hard at all   Food Insecurity: No Food Insecurity    Worried About Running Out of Food in the Last Year: Never true    [place] of Food in the Last Year: Never true   Transportation Needs:     Lack of Transportation (Medical): Not on file    Lack of Transportation (Non-Medical):  Not on file   Physical Activity:     Days of Exercise per Week: Not on file    Minutes of Exercise per Session: Not on file   Stress:     Feeling of Stress : Not on file   Social Connections:     Frequency of Communication with Friends and Family: Not on file    Frequency of Social Gatherings with Friends and Family: Not on file    Attends Yazidi Services: Not on file    Active Member of [address] [place] [address] [employer] or Organizations: Not on file    Attends Club or Organization Meetings: Not on file    Marital Status: Not on file   Intimate Partner Violence:     Fear of Current or Ex-Partner: Not on file    Emotionally Abused: Not on file    Physically Abused: Not on file    Sexually Abused: Not on file   Housing Stability:     Unable to Pay for Housing in the Last Year: Not on file    Number of [place] in the Last Year: Not on file    Unstable Housing in the Last Year: Not on file     Current Facility-Administered Medications   Medication Dose Route Frequency Provider Last Rate Last Admin    promethazine (PHENERGAN) tablet 25 mg  25 mg Oral Q6H PRN Niurka Quick MD   25 mg at 04/07/22 1606    cefdinir (OMNICEF) capsule 300 mg  300 mg Oral 2 times per day Niurka Quick MD         Current Outpatient Medications   Medication Sig Dispense Refill    promethazine (PHENERGAN) 12.5 MG tablet Take 1 tablet by mouth 3 times daily as needed for Nausea 8 tablet 0    cefdinir (OMNICEF) 300 MG capsule Take 1 capsule by mouth 2 times daily for 5 days 10 capsule 0    VYVANSE 50 MG capsule Take 1 capsule by mouth every morning for 30 days. Take 50 mg by mouth every morning. 30 capsule 0    HYDROcodone-acetaminophen (NORCO) 7.5-325 MG per tablet TAKE 1 TABLET BY MOUTH 2 TIMES A DAY AS NEEDED FOR PAIN FOR UP TO 30 DAYS. INDICATIONS: PAIN      predniSONE (DELTASONE) 10 MG tablet Take 10 mg by mouth See Admin Instructions       simvastatin (ZOCOR) 10 MG tablet Take 1 tablet by mouth nightly 90 tablet 1     Allergies   Allergen Reactions    Bee Venom Shortness Of Breath and Swelling    Bentyl [Dicyclomine Hcl] Shortness Of Breath and Anxiety    Dicyclomine Anxiety and Shortness Of Breath    Ketorolac Tromethamine Hives and Itching     Injectable only  Tolerates PO NSAIDs fine    Levofloxacin Anxiety and Hives    Maitake Anaphylaxis    Mushroom Extract Complex Anaphylaxis     Can't eat mushrooms.  Oxycodone-Acetaminophen Nausea And Vomiting     Tolerates Norco/Vicodin ok  Patient can not tolerate Percocet well. Extreme vomiting      Sulfa Antibiotics Shortness Of Breath    Vancomycin Anaphylaxis and Hives    Adhesive Tape Dermatitis     Other reaction(s): Dermatitis    Dicyclomine Hcl     Haldol [Haloperidol]      \"Freaks Out\"    Sulfacetamide Hives    Acetaminophen Anxiety     Patient reports when taking acetaminophen (including Norco/Percocet) she gets severe anxiety when taking this medication.      Butalbital-Apap-Caff-Cod Anxiety    Ceftaroline Rash    Daptomycin Swelling and Rash    Fosfomycin Tromethamine Nausea And Vomiting    Ketamine Anxiety and Nausea And Vomiting    Methocarbamol Rash    Nitrofurantoin Nausea And Vomiting     \"projectile vomiting\"    Prochlorperazine Anxiety    Reglan [Metoclopramide] Anxiety    Silicone Hives, Rash and Swelling    Tazobactam Nausea And Vomiting and Anxiety    Zosyn [Piperacillin Sod-Tazobactam So] Hives     Also causes nausea, SOB, dizziness       REVIEW OF SYSTEMS  10 systems reviewed, pertinent positives per HPI otherwise noted to be negative. PHYSICAL EXAM  /66   Pulse 93   Temp 98.6 °F (37 °C) (Oral)   Resp 16   Ht 4' 11\" (1.499 m)   Wt 154 lb 5.2 oz (70 kg)   LMP 10/31/2016 (Exact Date)   SpO2 100%   BMI 31.17 kg/m²    GENERAL APPEARANCE: Awake and alert. No acute distress. HENT: Normocephalic. Atraumatic. PERRL. EOMI. No facial droop. HEART/CHEST: RRR. LUNGS: Respirations unlabored. Speaking comfortably in full sentences. ABDOMEN: Soft, non-distended abdomen. Suprapubic tenderness to palpation. No guarding. No rebound. EXTREMITIES: no gross deformities. Moving all extremities. SKIN: Warm and dry. No acute rashes. NEUROLOGICAL: Alert and oriented. No gross facial drooping. Answering questions appropriately. Moving all extremities. PSYCHIATRIC: Pleasant. Normal mood and affect.     LABS  Results for orders placed or performed during the hospital encounter of 04/07/22   Urinalysis with Reflex to Culture    Specimen: Urine, clean catch   Result Value Ref Range    Color, UA Straw Straw/Yellow    Clarity, UA Clear Clear    Glucose, Ur Negative Negative mg/dL    Bilirubin Urine Negative Negative    Ketones, Urine Negative Negative mg/dL    Specific Gravity, UA 1.020 1.005 - 1.030    Blood, Urine Negative Negative    pH, UA 6.0 5.0 - 8.0    Protein, UA Negative Negative mg/dL    Urobilinogen, Urine 0.2 <2.0 E.U./dL    Nitrite, Urine Negative Negative    Leukocyte Esterase, Urine TRACE (A) Negative Microscopic Examination YES     Urine Type NotGiven     Urine Reflex to Culture Yes    Comprehensive Metabolic Panel   Result Value Ref Range    Sodium 142 136 - 145 mmol/L    Potassium 4.2 3.5 - 5.1 mmol/L    Chloride 104 99 - 110 mmol/L    CO2 20 (L) 21 - 32 mmol/L    Anion Gap 18 (H) 3 - 16    Glucose 131 (H) 70 - 99 mg/dL    BUN 15 7 - 20 mg/dL    CREATININE 0.6 0.6 - 1.1 mg/dL    GFR Non-African American >60 >60    GFR African American >60 >60    Calcium 9.7 8.3 - 10.6 mg/dL    Total Protein 7.3 6.4 - 8.2 g/dL    Albumin 4.7 3.4 - 5.0 g/dL    Albumin/Globulin Ratio 1.8 1.1 - 2.2    Total Bilirubin 0.4 0.0 - 1.0 mg/dL    Alkaline Phosphatase 124 40 - 129 U/L    ALT 14 10 - 40 U/L    AST 9 (L) 15 - 37 U/L   CBC with Auto Differential   Result Value Ref Range    WBC 13.1 (H) 4.0 - 11.0 K/uL    RBC 5.04 4.00 - 5.20 M/uL    Hemoglobin 15.0 12.0 - 16.0 g/dL    Hematocrit 44.5 36.0 - 48.0 %    MCV 88.2 80.0 - 100.0 fL    MCH 29.7 26.0 - 34.0 pg    MCHC 33.7 31.0 - 36.0 g/dL    RDW 13.7 12.4 - 15.4 %    Platelets 444 997 - 327 K/uL    MPV 7.5 5.0 - 10.5 fL    Neutrophils % 86.3 %    Lymphocytes % 11.4 %    Monocytes % 2.1 %    Eosinophils % 0.0 %    Basophils % 0.2 %    Neutrophils Absolute 11.3 (H) 1.7 - 7.7 K/uL    Lymphocytes Absolute 1.5 1.0 - 5.1 K/uL    Monocytes Absolute 0.3 0.0 - 1.3 K/uL    Eosinophils Absolute 0.0 0.0 - 0.6 K/uL    Basophils Absolute 0.0 0.0 - 0.2 K/uL   Lipase   Result Value Ref Range    Lipase 20.0 13.0 - 60.0 U/L   Lactic Acid   Result Value Ref Range    Lactic Acid 1.0 0.4 - 2.0 mmol/L   Troponin   Result Value Ref Range    Troponin <0.01 <0.01 ng/mL   Microscopic Urinalysis   Result Value Ref Range    Bacteria, UA 2+ (A) None Seen /HPF    Hyaline Casts, UA 4 0 - 8 /LPF    WBC, UA 11 (H) 0 - 5 /HPF    RBC, UA 1 0 - 4 /HPF    Epithelial Cells, UA 14 (H) 0 - 5 /HPF   EKG 12 Lead   Result Value Ref Range    Ventricular Rate 83 BPM    Atrial Rate 83 BPM    P-R Interval 126 ms    QRS Duration 80 ms    Q-T Interval 392 ms    QTc Calculation (Bazett) 460 ms    P Axis 47 degrees    R Axis -6 degrees    T Axis 25 degrees    Diagnosis       Normal sinus rhythm with sinus arrhythmiaMinimal voltage criteria for LVH, may be normal variantCannot rule out Inferior infarct , age undeterminedCannot rule out Anterior infarct , age undeterminedAbnormal ECGWhen compared with ECG of 14-DEC-2021 20:59,No significant change was found       I have reviewed all labs for this visit. ECG  The Ekg interpreted by me shows  Normal sinus rhythm with sinus arrhythmia with a rate of 83  Minimal voltage criteria for LVH, may be normal variant  QTc is acceptable at 460  Intervals and Durations are unremarkable. ST Segments: Nonspecific changes    RADIOLOGY  CT ABDOMEN PELVIS WO CONTRAST    Result Date: 4/7/2022  Site: [employer] [facility] #: [id number] #: [id number]: GSWREDAccount #: [de-identified] #: [id number] #: [id number]: CT ABDOMEN PELVIS WO CONTRASTExam Date/Time: 04/07/2022 08:30 AMAdmitting Diagnosis: Flank pain, kidney stone  suspectedReason for Exam: Flank pain, kidney stone suspected Dictated by: Moy Marquez CANDE: 04/07/2022 08:48 AMT: This document is confidential medical information. Unauthorized disclosure or use of this information is prohibited by law. If you are not the intended recipient of this document, please advise us by calling immediately [phone]. Impression/Conclusion below HISTORY:   Flank pain, kidney stone suspected right flank pain COMPARISON:  CT abdomen and pelvis 3/16/2022 TECHNIQUE: Noncontrast multiplanar CT images of the abdomen and pelvis NOTE:  If there are questions about the content of this report, please contact [address] [place] [address] radiology by calling [phone] FINDINGS: LOWER CHEST:  Unremarkable LIVER:  Unremarkable GALLBLADDER/BILE DUCTS:  Cholecystectomy. Mild biliary ductal dilation is improved from prior. No calcified stones along the bile ducts. PANCREAS:  Unremarkable. No mass or duct dilation SPLEEN:  Normal size. Small left upper quadrant splenule. ADRENALS:  Unremarkable  KIDNEYS/URETERS:  3 mm lower pole right renal calculus is similar. Punctate left midpole renal calculus also present. No hydronephrosis or ureteral calculi. GI TRACT:  The stomach is within normal limits. No small bowel dilation or evidence of bowel obstruction. Prior appendectomy. A few colonic diverticula are present without evidence of acute diverticulitis. No free air or significant free fluid. VESSELS:  Limited evaluation without IV contrast. Mild atherosclerotic vascular calcification. LYMPH NODES: Unremarkable. No enlarged lymph nodes ABD WALL:  Shunt catheter noted in the anterior abdominal wall subcutaneous tissues with tip in  the left central pelvis. No focal fluid collection along the tip of the catheter. PELVIS:  The bladder is within normal limits. No bladder stones. Prior hysterectomy. Ovaries are not well seen. BONES:  Mild scoliotic curvature of the spine. OTHER:  None IMPRESSION: Bilateral nephrolithiasis without hydronephrosis. SIGNED BY: Sandra Peterson MD on 4/7/2022  8:44 AM   [facility] Imaging Report - [address] [facility] [phone] -  [facility] Call Center: [phone]       CT HEAD WO CONTRAST    Result Date: 3/17/2022  Site: [facility] #: [id number] #: [id number]: Austin Bell #: [de-identified] #: [id number] #: [id number]: CT HEAD WO CONTRASTExam Date/Time: 03/17/2022 12:15 PMAdmitting Diagnosis: Intracranial shunt placement, follow  upReason for Exam: Intracranial shunt placement, follow up Dictated by: Lloyd Landon CANDE: 03/17/2022 01:09 PMT: This document is confidential medical information. Unauthorized disclosure or use of this information is prohibited by law. If you are not the intended recipient of this document, please advise us by calling immediately [phone].  Impression/Conclusion below HISTORY: Intracranial shunt placement, follow up headaches pain pressure,  shunt COMPARISON:  2/21/2022 TECHNIQUE:  Noncontrast multiplanar CT images of the head NOTE:  If there are questions about the content of this report, please contact [facility] radiology by calling [phone] FINDINGS: BRAIN PARENCHYMA: No hemorrhage or evidence of mass. Stable position of the left frontal approach intraventricular catheter. VENTRICLES/SULCI: Unchanged slitlike ventricles. No midline shift. EXTRA-AXIAL SPACES:  Unremarkable PARANASAL SINUSES/MASTOIDS: Unremarkable BONES:  Unremarkable OTHER: None IMPRESSION: Stable head CT without evidence of acute intracranial abnormality. Slitlike ventricles with ventriculostomy catheter in place, similar appearance to multiple comparisons. SIGNED BY: Chanda Hensley MD on 3/17/2022  1:06 PM   [address] [place] [address] [phone] -  [facility] Call Center: [phone]       CT ABDOMEN PELVIS W IV CONTRAST Additional Contrast? None    Result Date: 3/21/2022  EXAMINATION: CT OF THE ABDOMEN AND PELVIS WITH CONTRAST 3/21/2022 1:02 pm TECHNIQUE: CT of the abdomen and pelvis was performed with the administration of intravenous contrast. Multiplanar reformatted images are provided for review. Dose modulation, iterative reconstruction, and/or weight based adjustment of the mA/kV was utilized to reduce the radiation dose to as low as reasonably achievable. COMPARISON: 12/12/2021 HISTORY: ORDERING SYSTEM PROVIDED HISTORY: abd pain TECHNOLOGIST PROVIDED HISTORY: Additional Contrast?->None Reason for exam:->abd pain Decision Support Exception - unselect if not a suspected or confirmed emergency medical condition->Emergency Medical Condition (MA) FINDINGS: Lower Chest: There is bibasilar atelectasis. Organs: The liver, spleen, pancreas, adrenal glands and kidneys are unremarkable. Status post cholecystectomy with mild intrahepatic ductal dilatation.   A punctate nonobstructing nephrolithiasis present in the lower pole of the right kidney. GI/Bowel: There is no bowel obstruction. Status post appendectomy. Scattered diverticula involve the sigmoid colon without adjacent inflammation. Pelvis: Status post hysterectomy. Peritoneum/Retroperitoneum: There is no evidence of free fluid or adenopathy. Mild atherosclerosis involves the abdominal aorta. A right ventriculo peritoneal shunt catheter terminates in the extraperitoneal space of the anterior pelvis. Bones/Soft Tissues: Degenerative changes involve the lumbar spine and bilateral sacroiliac joints. 1. No acute abnormality. CT ABDOMEN PELVIS W IV CONTRAST    Result Date: 3/16/2022  Site: [facility] #: [id number] #: [id number]: GSWREDAccount #: [de-identified] #: [id number] #: [id number]: CT ABDOMEN PELVIS W CONTRASTExam Date/Time: 03/16/2022 06:00 AMAdmitting Diagnosis: Peritonitis or perforation suspectedReason for Exam: Peritonitis or perforation suspected Dictated by: Beauty Aschoff CANDE: 03/16/2022 06:40 AMT: This document is confidential medical information. Unauthorized disclosure or use of this information is prohibited by law. If you are not the intended recipient of this document, please advise us by calling immediately [phone]. Impression/Conclusion below 75 HISTORY:   Peritonitis or perforation suspected colonscopy 3/24/22,abd pain,n/v today COMPARISON:  3/10/2022. This is the 54th CT scan on record for this 81-year-old patient. TECHNIQUE: Post IV contrast multiplanar CT images of the abdomen and pelvis NOTE:  If there are questions about the content of this report, please contact [facility] radiology by calling [phone] FINDINGS: LOWER CHEST:  Mild scattered atelectasis. The lung bases are otherwise clear. LIVER:  Unremarkable GALLBLADDER/BILE DUCTS:  Stable prominence of the central intrahepatic and extrahepatic biliary  tree in this patient status post cholecystectomy. PANCREAS:  Unremarkable. No mass or duct dilation SPLEEN:  Normal spleen and small accessory spleen ADRENALS:  Unremarkable  KIDNEYS/URETERS:  Small punctate nonobstructive intrarenal calculus on the right is unchanged. GI TRACT:  Portions of a ventriculoperitoneal shunt catheter are observed entering anteriorly in the right upper quadrant and extending inferiorly to the midline of the anterior pelvis. Some mild scattered uncomplicated colonic diverticulosis is present  mostly in the sigmoid region. No bowel obstruction, pneumatosis, or free air detected. VESSELS:  Normal caliber aorta with mild aorto iliac atherosclerotic change. Normal opacification of the visualized visceral branches. LYMPH NODES: No pathologic lymphadenopathy ABD WALL:  Unremarkable PELVIS:  The nonopacified urinary bladder is poorly evaluated. The uterus is surgically absent. No free pelvic fluid. BONES:  No acute or concerning osseous abnormality. OTHER:  None IMPRESSION: No free air, active inflammatory change, or other acute intra-abdominal pathology detected. Stable presumed postsurgical biliary ectasia and punctate nonobstructive calculus in lower pole  the right kidney. SIGNED BY: Itz Rubin. Santos Perez MD on 3/16/2022  6:37 AM   [facility] [phone] -  2011 [facility]: [phone]       CT ABDOMEN PELVIS W IV CONTRAST    Result Date: 3/10/2022  Site: [facility] #: [id number] #: [id number]: GSWREDAccount #: [de-identified] #: [id number] #: [id number]: CT ABDOMEN PELVIS W CONTRASTExam Date/Time: 03/10/2022 05:30 PMAdmitting Diagnosis: Diverticulitis suspectedReason for Exam: Diverticulitis suspected Dictated by: Nery Rubin CANDE: 03/10/2022 06:11 PMT: This document is confidential medical information. Unauthorized disclosure or use of this information is prohibited by law. If you are not the intended recipient of this document, please advise us by calling immediately [phone]. Impression/Conclusion below 75 HISTORY:   Diverticulitis suspected COMPARISON:  CT abdomen and pelvis 2/22/2022 TECHNIQUE: Post IV contrast multiplanar CT images of the abdomen and pelvis NOTE:  If there are questions about the content of this report, please contact [facility] radiology by calling [phone] FINDINGS: LOWER CHEST:  Unremarkable LIVER:  Unremarkable GALLBLADDER/BILE DUCTS:  Cholecystectomy with mild biliary ductal dilation, unchanged. PANCREAS:  Unremarkable. No mass or duct dilation SPLEEN:  Normal size. Small left upper quadrant splenule. ADRENALS:  Unremarkable  KIDNEYS/URETERS:  Small lower pole right renal calculus is similar. No hydronephrosis or ureteral calculi. GI TRACT:  The stomach is collapsed, limiting evaluation. No bowel dilation or evidence of obstruction. There appears to be a small amount of pneumatosis within the distal small bowel, best seen on image 66 series 901. This segment demonstrates normal  wall enhancement. Prior appendectomy. Scattered colonic diverticula without evidence of acute diverticulitis. There is diffuse sigmoid colonic wall thickening. No free air or significant free fluid. VESSELS:  Mild atherosclerotic vascular calcification. LYMPH NODES: Unremarkable. No enlarged lymph nodes ABD WALL:  Portion of the shunt catheter again noted with tip terminating adjacent to the bladder dome. No focal fluid collection along the tip. PELVIS:  The bladder is within normal limits. Prior hysterectomy. Ovaries are not well seen and may have been removed. BONES:  Degenerative changes in the spine with scoliotic curvature. OTHER:  None IMPRESSION: 1. Sigmoid colonic wall thickening is suspicious for colitis. There is a small amount of nonspecific pneumatosis in the distal small bowel of the right lower quadrant, though the wall appears to enhance normally which makes ischemia less likely. Clinical correlation recommended.   No evidence of bowel obstruction. Colonic diverticulosis without evidence of acute diverticulitis. 2.  Right nephrolithiasis without hydronephrosis. NOTE:  Report marked for documented transmission to the Emergency Department per [facility] Radiology department protocol SIGNED BY: Jayson Butler MD on 3/10/2022  6:08 PM   [facility] [phone] -  [address] [facility] Street: [phone]       XR CHEST PORTABLE    Result Date: 4/7/2022  EXAMINATION: ONE XRAY VIEW OF THE CHEST 4/7/2022 12:23 pm COMPARISON: 07/22/21 HISTORY: ORDERING SYSTEM PROVIDED HISTORY: other TECHNOLOGIST PROVIDED HISTORY: Reason for exam:->other Reason for Exam: kidney stones, abdominal distention, no cough, some sob FINDINGS: There is a mild dextroscoliosis of the thoracic spine similar to the prior exam.  The heart and pulmonary vascularity are within normal limits. There are no focal areas of consolidation or pleural effusion. The osseous structures are intact. Shunt tubing overlies the left neck and chest     No acute abnormality     ED COURSE/MDM  Patient seen and evaluated. At presentation, patient was in moderate distress due to pain. She was afebrile, tachycardic to 111, and satting 100% on room air. Differential diagnosis includes nephrolithiasis, UTI, pyelonephritis, or others. Creatinine is normal at 0.6. She has leukocytosis with WBC of 13.1 with left shift. Lipase normal at 20. Lactates normal at 1. Troponin negative. Given the duration of symptoms, serial troponins not indicated at this time. Urine was nonsterile, had trace leuk esterase, 11 WBC, and 2+ bacteria. As patient reports suprapubic pain, will send urine for culture and will start treating as a UTI. Pharmacy consulted due to the multiple antibiotic allergies. After speaking with patient, will place patient on omnicef. She did not want to take the first dose in the ED and says she will take it as outpatient.  She was given prescription of phenergan to take as needed for nausea and vomiting. Patient discharged home with strict return precautions. Pt was seen during the [place] 19 pandemic. Appropriate PPE worn by ME during patient encounters. Patient was cared for during a time with constrained hospital bed capacity with nationwide stress on resources and staffing. During the patient's ED course, the patient was given:  Medications   promethazine (PHENERGAN) tablet 25 mg (25 mg Oral Given 4/7/22 1606)   cefdinir (OMNICEF) capsule 300 mg (300 mg Oral Not Given 4/7/22 1616)   morphine (PF) injection 4 mg (4 mg IntraVENous Given 4/7/22 1357)   iopamidol (ISOVUE-370) 76 % injection 75 mL (75 mLs IntraVENous Given 4/7/22 1447)   oxyCODONE (ROXICODONE) immediate release tablet 5 mg (5 mg Oral Given 4/7/22 1550)        CLINICAL IMPRESSION  1. Nausea    2. Acute cystitis without hematuria        Blood pressure 134/66, pulse 93, temperature 98.6 °F (37 °C), temperature source Oral, resp. rate 16, height 4' 11\" (1.499 m), weight 154 lb 5.2 oz (70 kg), last menstrual period 10/31/2016, SpO2 100 %, unknown if currently breastfeeding. Justin Joshua was discharged home in stable condition. Patient was given scripts for the following medications. I counseled patient how to take these medications. Discharge Medication List as of 4/7/2022  4:19 PM      START taking these medications    Details   promethazine (PHENERGAN) 12.5 MG tablet Take 1 tablet by mouth 3 times daily as needed for Nausea, Disp-8 tablet, R-0Print      cefdinir (OMNICEF) 300 MG capsule Take 1 capsule by mouth 2 times daily for 5 days, Disp-10 capsule, R-0Print             Follow-up with:  Almaz Shipman [id number] [facility]    In 2 days        DISCLAIMER: This chart was created using Dragon dictation software.   Efforts were made by me to ensure accuracy, however some errors may be present due to limitations of this technology and occasionally words are not transcribed correctly.        Génesis Tejada MD  04/07/22 Sharyle Porter

## 2022-04-08 LAB
EKG ATRIAL RATE: 83 BPM
EKG DIAGNOSIS: NORMAL
EKG P AXIS: 47 DEGREES
EKG P-R INTERVAL: 126 MS
EKG Q-T INTERVAL: 392 MS
EKG QRS DURATION: 80 MS
EKG QTC CALCULATION (BAZETT): 460 MS
EKG R AXIS: -6 DEGREES
EKG T AXIS: 25 DEGREES
EKG VENTRICULAR RATE: 83 BPM
URINE CULTURE, ROUTINE: NORMAL

## 2022-04-08 PROCEDURE — 93010 ELECTROCARDIOGRAM REPORT: CPT | Performed by: INTERNAL MEDICINE

## 2022-04-09 PROCEDURE — 99283 EMERGENCY DEPT VISIT LOW MDM: CPT

## 2022-04-10 ENCOUNTER — HOSPITAL ENCOUNTER (EMERGENCY)
Age: 42
Discharge: LEFT BEFORE DISCHARGE | End: 2022-04-10
Attending: EMERGENCY MEDICINE
Payer: COMMERCIAL

## 2022-04-10 VITALS
SYSTOLIC BLOOD PRESSURE: 128 MMHG | HEART RATE: 112 BPM | BODY MASS INDEX: 30.13 KG/M2 | OXYGEN SATURATION: 100 % | HEIGHT: 59 IN | RESPIRATION RATE: 16 BRPM | DIASTOLIC BLOOD PRESSURE: 99 MMHG | WEIGHT: 149.47 LBS | TEMPERATURE: 98.4 F

## 2022-04-10 DIAGNOSIS — G89.29 ABDOMINAL PAIN, CHRONIC, GENERALIZED: Primary | ICD-10-CM

## 2022-04-10 DIAGNOSIS — R10.84 ABDOMINAL PAIN, CHRONIC, GENERALIZED: Primary | ICD-10-CM

## 2022-04-10 RX ORDER — PROMETHAZINE HYDROCHLORIDE 25 MG/1
SUPPOSITORY RECTAL
COMMUNITY
Start: 2022-03-17 | End: 2022-06-10

## 2022-04-10 RX ORDER — OXYCODONE HYDROCHLORIDE 5 MG/1
TABLET ORAL
COMMUNITY
Start: 2022-03-22 | End: 2022-06-10

## 2022-04-10 RX ORDER — GABAPENTIN 100 MG/1
CAPSULE ORAL
COMMUNITY
Start: 2022-04-05 | End: 2022-08-23

## 2022-04-10 ASSESSMENT — PAIN DESCRIPTION - DESCRIPTORS: DESCRIPTORS: DISCOMFORT

## 2022-04-10 ASSESSMENT — PAIN - FUNCTIONAL ASSESSMENT
PAIN_FUNCTIONAL_ASSESSMENT: ACTIVITIES ARE NOT PREVENTED
PAIN_FUNCTIONAL_ASSESSMENT: 0-10

## 2022-04-10 ASSESSMENT — PAIN SCALES - GENERAL: PAINLEVEL_OUTOF10: 9

## 2022-04-10 ASSESSMENT — PAIN DESCRIPTION - ONSET: ONSET: ON-GOING

## 2022-04-10 ASSESSMENT — PAIN DESCRIPTION - PAIN TYPE: TYPE: ACUTE PAIN;CHRONIC PAIN

## 2022-04-10 ASSESSMENT — PAIN DESCRIPTION - LOCATION: LOCATION: ABDOMEN

## 2022-04-10 NOTE — ED PROVIDER NOTES
Emergency Department Encounter    Patient: Kavita Hayes  MRN: [id number]  : 1980  Date of Evaluation: 4/10/2022  ED Provider:  Jennifer Medrano MD    Triage Chief Complaint:  Aminal pain, nausea vomiting fever  [place]:  Kavita Hayes is a 46 y.o. female that presents ER for evaluation of decrease in urine output abdominal cramping, she had 2 CAT scans 3 days prior, from 2 separate ERs. She has had 21 separate CAT scans in the last 1 year, alone. She has a history of known intrarenal stones, resistant gram-negative infections, multiple ED visits, and multiple hospitalizations. On arrival the patient is afebrile, states that she was febrile at home complaining of nausea and vomiting and decreasing urine output. Cultures from ,  were negative, blood cultures from the 3, 25: Negative    ROS - see HPI, below listed is current ROS at time of my eval:  General:  No fevers, no chills, no weakness  Eyes:  no discharge  ENT:  No sore throat, no nasal congestion  Cardiovascular:  No chest pain, no palpitations  Respiratory:  No shortness of breath, no cough, no wheezing  Gastrointestinal:  + pain, + nausea, no vomiting, no diarrhea  Musculoskeletal:  No muscle pain, no joint pain  Skin:  No rash, no pruritis  Neurologic:  no headache  Genitourinary:  No dysuria, no hematuria  Endocrine:  No unexpected weight gain, no unexpected weight loss  Extremities:  no edema, no pain    Past Medical History:   Diagnosis Date    ADHD (attention deficit hyperactivity disorder) 82    Depression with anxiety     Diabetes mellitus ([facility] [address]     pre-diabetes    Difficult intubation     Encounter for imaging to screen for metal prior to MRI 2021    MRI Conditional Medtronic Non-Programmable shunt model#[id number] implanted 10/30/2020 at [facility]. Normal Mode.  1.5T or 3.0T.    ESBL (extended spectrum beta-lactamase) producing bacteria infection 2019    urine    Functional ovarian cysts 2008    rt ovary cyst x 2 yrs.     Headache(784.0)     migraines    History of blood transfusion     at birth   Angela Ahn History of kidney stones     History of PCOS     Hydrocephalus ([address]     Hyperlipidemia     Irritable bowel syndrome 2004    had colonoscopy about 6 yrs ago    Meningitis 2210    Neutrophilic leukocytosis     Nicotine dependence     PONV (postoperative nausea and vomiting)     very nauseated and sometimes wakes up with a Migraine--happened once after brain surgery    Primary osteoarthritis of left knee 2016    S/P cone biopsy of cervix     Scoliosis .s     (ventriculoperitoneal) shunt status     hydrocephalus f/w Neuro[facility]geon at [facility]     (ventriculoperitoneal) shunt status     Wears glasses     reading     Past Surgical History:   Procedure Laterality Date    ABDOMEN SURGERY N/A 2021    REMOVAL OF ABDOMINAL WALL MASS performed by Killian Duenas MD at [address] [place] [address]  2003    appendicitis     SECTION      placenta previa    CHOLECYSTECTOMY      COLONOSCOPY  2004    COLPOSCOPY      CSF SHUNT      replaced at age 17   Angela Ahn CYSTOSCOPY      stone removal    HEMORRHOID SURGERY      HERNIA REPAIR Bilateral     inguinal    HERNIA REPAIR      hiatal hernia    HYSTERECTOMY, TOTAL ABDOMINAL  2016    TAHBSO, adhesions/PCOS    KNEE ARTHROSCOPY Left 2015    medial meniscectomy, chondroplasty, plica resection    LITHOTRIPSY Bilateral 12/10/2019    OTHER SURGICAL HISTORY  10/19/2016    op lap    VENTRICULOPERITONEAL SHUNT      multiple revisions, most recent      Family History   Problem Relation Age of Onset    High Blood Pressure Mother     Diabetes Mother     High Cholesterol Mother     Depression Mother     Diabetes Maternal Grandmother     High Blood Pressure Maternal Grandmother     High Cholesterol Maternal Grandmother     Heart Disease Maternal Grandfather     High Blood Pressure Maternal Grandfather     Heart Disease Paternal Grandmother     Stroke Paternal Grandfather     Depression Sister     Cirrhosis Father     Rheum Arthritis Neg Hx     Osteoarthritis Neg Hx     Asthma Neg Hx     Breast Cancer Neg Hx     Cancer Neg Hx     Heart Failure Neg Hx     Hypertension Neg Hx     Migraines Neg Hx     Ovarian Cancer Neg Hx     Rashes/Skin Problems Neg Hx     Seizures Neg Hx     Thyroid Disease Neg Hx      Social History     Socioeconomic History    Marital status: Single     Spouse name: Not on file    Number of children: Not on file    Years of education: Not on file    Highest education level: Not on file   Occupational History    Occupation: disability, SSI    Tobacco Use    Smoking status: Current Every Day Smoker     Packs/day: 0.50     Years: 18.00     Pack years: 9.00     Types: Cigarettes    Smokeless tobacco: Never Used   Vaping Use    Vaping Use: Never used   Substance and Sexual Activity    Alcohol use: No     Alcohol/week: 0.0 standard drinks    Drug use: Never    Sexual activity: Not Currently     Partners: Male   Other Topics Concern    Not on file   Social History Narrative    Not on file     Social Determinants of Health     Financial Resource Strain: Low Risk     Difficulty of Paying Living Expenses: Not hard at all   Food Insecurity: No Food Insecurity    Worried About Running Out of Food in the Last Year: Never true    [place] of Food in the Last Year: Never true   Transportation Needs:     Lack of Transportation (Medical): Not on file    Lack of Transportation (Non-Medical):  Not on file   Physical Activity:     Days of Exercise per Week: Not on file    Minutes of Exercise per Session: Not on file   Stress:     Feeling of Stress : Not on file   Social Connections:     Frequency of Communication with Friends and Family: Not on file    Frequency of Social Gatherings with Friends and Family: Not on file    Attends Buddhism Services: Not on file   [employer] of Clubs or Organizations: Not on file    Attends Club or Organization Meetings: Not on file    Marital Status: Not on file   Intimate Partner Violence:     Fear of Current or Ex-Partner: Not on file    Emotionally Abused: Not on file    Physically Abused: Not on file    Sexually Abused: Not on file   Housing Stability:     Unable to Pay for Housing in the Last Year: Not on file    Number of Jabari in the Last Year: Not on file    Unstable Housing in the Last Year: Not on file     No current facility-administered medications for this encounter. Current Outpatient Medications   Medication Sig Dispense Refill    promethazine (PHENERGAN) 12.5 MG tablet Take 1 tablet by mouth 3 times daily as needed for Nausea 8 tablet 0    cefdinir (OMNICEF) 300 MG capsule Take 1 capsule by mouth 2 times daily for 5 days 10 capsule 0    VYVANSE 50 MG capsule Take 1 capsule by mouth every morning for 30 days. Take 50 mg by mouth every morning. 30 capsule 0    HYDROcodone-acetaminophen (NORCO) 7.5-325 MG per tablet TAKE 1 TABLET BY MOUTH 2 TIMES A DAY AS NEEDED FOR PAIN FOR UP TO 30 DAYS. INDICATIONS: PAIN      predniSONE (DELTASONE) 10 MG tablet Take 10 mg by mouth See Admin Instructions       simvastatin (ZOCOR) 10 MG tablet Take 1 tablet by mouth nightly 90 tablet 1     Allergies   Allergen Reactions    Bee Venom Shortness Of Breath and Swelling    Bentyl [Dicyclomine Hcl] Shortness Of Breath and Anxiety    Dicyclomine Anxiety and Shortness Of Breath    Ketorolac Tromethamine Hives and Itching     Injectable only  Tolerates PO NSAIDs fine    Levofloxacin Anxiety and Hives    Maitake Anaphylaxis    Mushroom Extract Complex Anaphylaxis     Can't eat mushrooms.  Oxycodone-Acetaminophen Nausea And Vomiting     Tolerates Norco/Vicodin ok  Patient can not tolerate Percocet well.  Extreme vomiting      Sulfa Antibiotics Shortness Of Breath    Vancomycin Anaphylaxis and Hives    Adhesive Tape Dermatitis     Other reaction(s): Dermatitis    Dicyclomine Hcl     Haldol [Haloperidol]      \"Freaks Out\"    Sulfacetamide Hives    Acetaminophen Anxiety     Patient reports when taking acetaminophen (including Norco/Percocet) she gets severe anxiety when taking this medication.  Butalbital-Apap-Caff-Cod Anxiety    Ceftaroline Rash    Daptomycin Swelling and Rash    Fosfomycin Tromethamine Nausea And Vomiting    Ketamine Anxiety and Nausea And Vomiting    Methocarbamol Rash    Nitrofurantoin Nausea And Vomiting     \"projectile vomiting\"    Prochlorperazine Anxiety    Reglan [Metoclopramide] Anxiety    Silicone Hives, Rash and Swelling    Tazobactam Nausea And Vomiting and Anxiety    Zosyn [Piperacillin Sod-Tazobactam So] Hives     Also causes nausea, SOB, dizziness       Nursing Notes Reviewed    Physical Exam:  Triage VS:    ED Triage Vitals   Enc Vitals Group      BP       Pulse       Resp       Temp       Temp src       SpO2       Weight       Height       Head Circumference       Peak Flow       Pain Score       Pain Loc       Pain Edu? Excl. in [address]? My pulse ox interpretation is - normal    General appearance:  No acute distress. Skin:  Warm. Dry. No rash. Neck:  Trachea midline. Heart:  Regular rate and rhythm, normal S1 & S2.    Perfusion:  intact  Respiratory:  Lungs clear to auscultation bilaterally. Respirations nonlabored. Abdominal:  Mild  tenderness to palpation, no rebound guarding or rigidity, neg Blue's sign, neg McBurney's point tenderness to palpation, normal bowel sounds, no masses, no organomegaly, no pulsatile masses  Back:  No CVA TTP  Extremity:    normal ROM   Neurological:  Alert and oriented times 3. I have reviewed and interpreted all of the currently available lab results from this visit (if applicable):  No results found for this visit on 04/10/22. Radiographs (if obtained):  Radiologist's Report Reviewed:  No results found.     EKG (if obtained): (All EKG's are interpreted by myself in the absence of a cardiologist)      MDM:  Patient eloped prior to diagnostic evaluation she refused straight cath, bladder scan showed only 75 cc of retained urine, I reviewed her urine culture from 4 7 which was negative for infection, she had multiple epithelial cells, her blood cultures from 325 are negative she has had 2 CAT scans from 2 separate facilities on 4/7/22, all which showed no evidence of bowel obstruction, no ureteral obstruction, and intrarenal stone unchanged. And she has had over 21 CAT scans in the last year alone, she was offered antiemetic therapy and refused this. She was in no significant distress, medical screening exam was performed and she eloped prior to further diagnostic evaluation  Clinical Impression:  1. Abdominal pain, chronic, generalized      Disposition referral (if applicable):  No follow-up provider specified. Disposition medications (if applicable):  New Prescriptions    No medications on file       Comment: Please note this report has been produced using speech recognition software and may contain errors related to that system including errors in grammar, punctuation, and spelling, as well as words and phrases that may be inappropriate. Efforts were made to edit the dictations.       Mahnaz Richardson MD  86/99/17 [id number]       Mahnaz Richardson MD  28/06/66 [id number]

## 2022-04-10 NOTE — ED NOTES
Pt called RN to room and stated she would like to go to [facility] because her urologist is there. EDMD and primary nurse made aware.       [facility]  04/10/22 0623

## 2022-04-20 ENCOUNTER — TELEPHONE (OUTPATIENT)
Dept: PRIMARY CARE CLINIC | Age: 42
End: 2022-04-20

## 2022-04-20 DIAGNOSIS — B37.0 THRUSH: Primary | ICD-10-CM

## 2022-04-20 RX ORDER — CLOTRIMAZOLE 10 MG/1
10 LOZENGE ORAL; TOPICAL
Qty: 50 TABLET | Refills: 0 | Status: SHIPPED | OUTPATIENT
Start: 2022-04-20 | End: 2022-04-30

## 2022-04-20 NOTE — TELEPHONE ENCOUNTER
Clotrimazole troches sent to the pharmacy. These are tablets that you let dissolve slowly in your mouth - Do not chew.

## 2022-04-20 NOTE — TELEPHONE ENCOUNTER
Pt called stated that she was just discharged from the hospital told that she had thrush and that they were going to call something in but they never did. Asking for us to call in something.  hugo enriquez

## 2022-05-04 ENCOUNTER — PATIENT MESSAGE (OUTPATIENT)
Dept: PRIMARY CARE CLINIC | Age: 42
End: 2022-05-04

## 2022-05-04 DIAGNOSIS — F90.9 ATTENTION DEFICIT HYPERACTIVITY DISORDER (ADHD), UNSPECIFIED ADHD TYPE: ICD-10-CM

## 2022-05-04 RX ORDER — LISDEXAMFETAMINE DIMESYLATE 50 MG
50 CAPSULE ORAL EVERY MORNING
Qty: 30 CAPSULE | Refills: 0 | Status: SHIPPED | OUTPATIENT
Start: 2022-05-04 | End: 2022-06-06 | Stop reason: SDUPTHER

## 2022-05-04 NOTE — TELEPHONE ENCOUNTER
From: Justina March  To: Lisa Ku  Sent: 5/4/2022 12:46 PM EDT  Subject: My vyvanse is due     I was Sure If [facility] [address] in [place] thanks

## 2022-06-06 DIAGNOSIS — F90.9 ATTENTION DEFICIT HYPERACTIVITY DISORDER (ADHD), UNSPECIFIED ADHD TYPE: ICD-10-CM

## 2022-06-06 RX ORDER — LISDEXAMFETAMINE DIMESYLATE 50 MG
50 CAPSULE ORAL EVERY MORNING
Qty: 30 CAPSULE | Refills: 0 | Status: SHIPPED | OUTPATIENT
Start: 2022-06-06 | End: 2022-07-08 | Stop reason: SDUPTHER

## 2022-06-06 NOTE — TELEPHONE ENCOUNTER
Appointment given   Call for refill. Advised that she is over due for an appt.  Scheduled appt 06/10/2022

## 2022-06-09 ENCOUNTER — TELEPHONE (OUTPATIENT)
Dept: PRIMARY CARE CLINIC | Age: 42
End: 2022-06-09

## 2022-06-09 NOTE — TELEPHONE ENCOUNTER
----- Message from Marti Malagon sent at 6/8/2022  6:10 PM EDT -----  Regarding: [place] pt care committee meeting  Ms. Cevallosya Srini Kulkarni. I hope that you had a good day today. My name is Tyree Pringle, I am a community health worker (CHW) at [facility] [place].     I wanted to notify you that two patients who see you for primary care including Sarah Perez [phone] have been added to our hospital committee meeting for discussion based on increased ED visits. I would love to collaborate with you on long-term goals with these patients to assist in decreasing ER visits. Respectfully,  [email or web address]

## 2022-06-10 ENCOUNTER — OFFICE VISIT (OUTPATIENT)
Dept: PRIMARY CARE CLINIC | Age: 42
End: 2022-06-10
Payer: COMMERCIAL

## 2022-06-10 VITALS
BODY MASS INDEX: 30.64 KG/M2 | OXYGEN SATURATION: 98 % | SYSTOLIC BLOOD PRESSURE: 120 MMHG | DIASTOLIC BLOOD PRESSURE: 84 MMHG | WEIGHT: 152 LBS | HEIGHT: 59 IN | HEART RATE: 114 BPM

## 2022-06-10 DIAGNOSIS — K92.1 BLOODY STOOLS: ICD-10-CM

## 2022-06-10 DIAGNOSIS — K52.9 COLITIS: Primary | ICD-10-CM

## 2022-06-10 DIAGNOSIS — Z98.2 VP (VENTRICULOPERITONEAL) SHUNT STATUS: ICD-10-CM

## 2022-06-10 DIAGNOSIS — Q03.9 CONGENITAL HYDROCEPHALUS (HCC): ICD-10-CM

## 2022-06-10 DIAGNOSIS — F90.2 ATTENTION DEFICIT HYPERACTIVITY DISORDER (ADHD), COMBINED TYPE: ICD-10-CM

## 2022-06-10 DIAGNOSIS — K57.90 DIVERTICULOSIS: ICD-10-CM

## 2022-06-10 PROCEDURE — 99214 OFFICE O/P EST MOD 30 MIN: CPT | Performed by: NURSE PRACTITIONER

## 2022-06-10 PROCEDURE — G8417 CALC BMI ABV UP PARAM F/U: HCPCS | Performed by: NURSE PRACTITIONER

## 2022-06-10 PROCEDURE — 4004F PT TOBACCO SCREEN RCVD TLK: CPT | Performed by: NURSE PRACTITIONER

## 2022-06-10 PROCEDURE — G8427 DOCREV CUR MEDS BY ELIG CLIN: HCPCS | Performed by: NURSE PRACTITIONER

## 2022-06-10 ASSESSMENT — PATIENT HEALTH QUESTIONNAIRE - PHQ9
SUM OF ALL RESPONSES TO PHQ9 QUESTIONS 1 & 2: 0
SUM OF ALL RESPONSES TO PHQ QUESTIONS 1-9: 0
1. LITTLE INTEREST OR PLEASURE IN DOING THINGS: 0
2. FEELING DOWN, DEPRESSED OR HOPELESS: 0

## 2022-06-10 NOTE — PROGRESS NOTES
Maranda Marroquin (:  1980) is a 46 y.o. female,Established patient, here for evaluation of the following chief complaint(s):  ADHD (adhd follow up ), Referral - General (pt needs a referral to gi for passing blood in her stool), and Other (pt is having issues with her shunt- pasing out states that no one will help her , having issues with vison again too )    Bloody stools: Still having couple times a month, has normal poops in between. Had one large bloody BM this morning. Has cramps and abdominal pain as well. No fever or emesis. States that she has not followed up with GI outpatient. Last CT scan and blood work were all normal. No signs of diverticulitis.  shunt: Shunt issues again. Refuses to go back to [place] after seeing 3 docs. Now passing out 3x a week because of it. Vision blurry again. State she has pressure on the left side of her head. She states she has been calling neurosurgeons in [facility] and just needs referrals sent so she can be seen. She didn't specific if [facility] or [facility]. ADHD: Doing well on medications. Denies any side effects. ASSESSMENT/PLAN:  1. Colitis  -     AFL - Nicolas Delgado DO, Gastroenterology, [facility]  2. Bloody stools  -     AFL - Nicolas Delgado DO, Gastroenterology, [facility]  3. Congenital hydrocephalus ([facility] [address]  -     External Referral To Neurosurgery  4.  (ventriculoperitoneal) shunt status  -     External Referral To Neurosurgery  5. Diverticulosis  6. Attention deficit hyperactivity disorder (ADHD), combined type      Return in about 3 months (around 9/10/2022) for Med Check.     -When discussing treatment for possible colitis, pt denies wanting any oral medications. States she can not tolerate them. Discussed with pt that if bloody stools worsen, would need to go to ER for IV abx. She verbalizes understanding.   -Discussed just need fax number for neurosurgeon and will fax referral to discuss options. Subjective   SUBJECTIVE/OBJECTIVE:    Review of Systems   Constitutional: Negative for chills, diaphoresis, fatigue and fever. Respiratory: Negative for cough and shortness of breath. Cardiovascular: Negative for chest pain, palpitations and leg swelling. Gastrointestinal: Positive for abdominal pain and blood in stool. Negative for abdominal distention, anal bleeding, constipation, diarrhea, nausea, rectal pain and vomiting. Neurological: Positive for headaches. Negative for dizziness, weakness and numbness. Psychiatric/Behavioral: Negative for decreased concentration, dysphoric mood, hallucinations and sleep disturbance. The patient is nervous/anxious. Objective   Physical Exam  Vitals and nursing note reviewed. Constitutional:       Appearance: Normal appearance. She is well-developed. Cardiovascular:      Rate and Rhythm: Normal rate and regular rhythm. Heart sounds: Normal heart sounds, S1 normal and S2 normal.   Pulmonary:      Effort: Pulmonary effort is normal.      Breath sounds: Normal breath sounds and air entry. Abdominal:      General: Abdomen is flat. Bowel sounds are normal.      Palpations: Abdomen is soft. Tenderness: There is generalized abdominal tenderness. There is no right CVA tenderness, left CVA tenderness, guarding or rebound. Negative signs include Blue's sign, Rovsing's sign, McBurney's sign, psoas sign and obturator sign. Neurological:      Mental Status: She is alert. Psychiatric:         Attention and Perception: Attention and perception normal.         Mood and Affect: Mood is anxious. Affect is tearful. Speech: Speech normal.         Behavior: Behavior normal. Behavior is cooperative. Thought Content: Thought content normal.         Cognition and Memory: Cognition normal.         Judgment: Judgment normal.            An electronic signature was used to authenticate this note.     --Luis Buenrostro, FREDRICK - CNP

## 2022-06-10 NOTE — PATIENT INSTRUCTIONS
Patient Education        Learning About Colitis  What is colitis? Colitis is swelling (inflammation) of the colon. The colon makes up most of thelarge intestine. Many conditions can cause colitis. What are some types of colitis?  Infectious colitis is a type that appears suddenly. It's caused by a bacteria or virus, such as salmonella, shigella, or campylobacter.  Ischemic colitis is caused by problems with blood flow to the colon. This can happen after surgery. It can also be caused by other health problems.  Microscopic colitis doesn't always have a clear cause. It can only be found with special tests.  Crohn's disease and ulcerative colitis are lifelong diseases. They can cause swelling, inflammation, and deep sores in the lining of the digestive tract. What are the symptoms? Symptoms may include fever, diarrhea that may be bloody, or belly pain. You may also have an urgent need to move your bowels or pain when you move yourbowels. Or you may have bleeding from the rectum or weight loss. Your symptoms may depend on the type of colitis you have. For example,microscopic colitis may cause watery diarrhea. Sometimes symptoms go away on their own. If they don't go away, or if you havebleeding or severe pain, call your doctor right away. How is it diagnosed? You may need blood tests or a stool test. You also may need imaging tests like a CT scan. You may have a colonoscopy so that a doctor can look inside your colon. In some cases, the doctor may want to test a sample of tissue from theintestine. This test is called a biopsy. How is it treated? Treatment for colitis depends on the condition that is causing it.  Antibiotics may be used to treat an infection.  Diet changes may help with symptoms.  Medicines can also help to relieve inflammation and control symptoms.  In some cases, surgery to remove parts of the intestine may be needed.   Follow-up care is a key part of your treatment and safety. Be sure to make and go to all appointments, and call your doctor if you are having problems. It's also a good idea to know your test results and keep alist of the medicines you take. Where can you learn more? Go to https://chdavideb.gopogo. org and sign in to your [employer] account. Enter C130 in the [employer] box to learn more about \"Learning About Colitis. \"     If you do not have an account, please click on the \"Sign Up Now\" link. Current as of: September 8, 2021               Content Version: 13.2  © 4493-5842 Healthwise, Incorporated. Care instructions adapted under license by [facility] If you have questions about a medical condition or this instruction, always ask your healthcare professional. [address] any warranty or liability for your use of this information.

## 2022-06-13 ENCOUNTER — TELEPHONE (OUTPATIENT)
Dept: PRIMARY CARE CLINIC | Age: 42
End: 2022-06-13

## 2022-06-13 PROBLEM — K57.90 DIVERTICULOSIS: Status: ACTIVE | Noted: 2022-03-11

## 2022-06-13 PROBLEM — T85.618A SHUNT MALFUNCTION: Status: RESOLVED | Noted: 2020-10-21 | Resolved: 2022-06-13

## 2022-06-13 PROBLEM — N23 RENAL COLIC: Status: RESOLVED | Noted: 2019-11-07 | Resolved: 2022-06-13

## 2022-06-13 PROBLEM — A87.9 VIRAL MENINGITIS: Status: RESOLVED | Noted: 2018-06-30 | Resolved: 2022-06-13

## 2022-06-13 PROBLEM — L03.311 CELLULITIS OF ABDOMINAL WALL: Status: RESOLVED | Noted: 2021-07-22 | Resolved: 2022-06-13

## 2022-06-13 PROBLEM — N39.0 COMPLICATED UTI (URINARY TRACT INFECTION): Status: RESOLVED | Noted: 2019-11-07 | Resolved: 2022-06-13

## 2022-06-13 PROBLEM — G89.29 OTHER CHRONIC PAIN: Status: RESOLVED | Noted: 2018-05-21 | Resolved: 2022-06-13

## 2022-06-13 PROBLEM — A41.9 SEPSIS (HCC): Status: RESOLVED | Noted: 2018-06-30 | Resolved: 2022-06-13

## 2022-06-13 PROBLEM — K63.89 PNEUMATOSIS OF INTESTINES: Status: RESOLVED | Noted: 2022-03-11 | Resolved: 2022-06-13

## 2022-06-13 PROBLEM — R11.2 INTRACTABLE VOMITING WITH NAUSEA: Status: RESOLVED | Noted: 2019-11-07 | Resolved: 2022-06-13

## 2022-06-13 PROBLEM — R10.9 INTRACTABLE ABDOMINAL PAIN: Status: RESOLVED | Noted: 2021-10-13 | Resolved: 2022-06-13

## 2022-06-13 PROBLEM — E87.6 HYPOKALEMIA: Status: RESOLVED | Noted: 2018-06-30 | Resolved: 2022-06-13

## 2022-06-13 PROBLEM — R82.81 PYURIA: Status: RESOLVED | Noted: 2018-06-30 | Resolved: 2022-06-13

## 2022-06-13 ASSESSMENT — ENCOUNTER SYMPTOMS
DIARRHEA: 0
SHORTNESS OF BREATH: 0
RECTAL PAIN: 0
BLOOD IN STOOL: 1
ABDOMINAL PAIN: 1
VOMITING: 0
NAUSEA: 0
ABDOMINAL DISTENTION: 0
ANAL BLEEDING: 0
COUGH: 0
CONSTIPATION: 0

## 2022-06-13 NOTE — TELEPHONE ENCOUNTER
Pt called on call stating that she has been having bad pressure on the left side of her head, and her pain medicine which usually helps is not helping. Also she is experiencing severe dizziness and near syncope. States her aunt is having to help her go to the bathroom because otherwise she will fall over and pass out. Does not want to go to ER. Discussed with pt if she is unable to walk herself, and can not stay hydrated, she needs to go to ER. She states she will go if she does not improve.

## 2022-06-14 DIAGNOSIS — R42 DIZZINESS: Primary | ICD-10-CM

## 2022-06-14 RX ORDER — MECLIZINE HCL 12.5 MG/1
12.5 TABLET ORAL 3 TIMES DAILY PRN
Qty: 30 TABLET | Refills: 0 | Status: SHIPPED | OUTPATIENT
Start: 2022-06-14 | End: 2022-06-24

## 2022-06-15 ENCOUNTER — APPOINTMENT (OUTPATIENT)
Dept: CT IMAGING | Age: 42
End: 2022-06-15
Payer: COMMERCIAL

## 2022-06-15 ENCOUNTER — HOSPITAL ENCOUNTER (EMERGENCY)
Age: 42
Discharge: HOME | End: 2022-06-15
Attending: EMERGENCY MEDICINE
Payer: COMMERCIAL

## 2022-06-15 VITALS
TEMPERATURE: 98.9 F | HEART RATE: 92 BPM | RESPIRATION RATE: 18 BRPM | SYSTOLIC BLOOD PRESSURE: 119 MMHG | OXYGEN SATURATION: 98 % | WEIGHT: 140.87 LBS | DIASTOLIC BLOOD PRESSURE: 89 MMHG | BODY MASS INDEX: 28.45 KG/M2

## 2022-06-15 DIAGNOSIS — R51.9 CHRONIC NONINTRACTABLE HEADACHE, UNSPECIFIED HEADACHE TYPE: Primary | ICD-10-CM

## 2022-06-15 DIAGNOSIS — G89.29 CHRONIC NONINTRACTABLE HEADACHE, UNSPECIFIED HEADACHE TYPE: Primary | ICD-10-CM

## 2022-06-15 LAB
ANION GAP SERPL CALCULATED.3IONS-SCNC: 14 MMOL/L (ref 3–16)
BASOPHILS ABSOLUTE: 0.1 K/UL (ref 0–0.2)
BASOPHILS RELATIVE PERCENT: 0.7 %
BUN BLDV-MCNC: 15 MG/DL (ref 7–20)
CALCIUM SERPL-MCNC: 9.8 MG/DL (ref 8.3–10.6)
CHLORIDE BLD-SCNC: 100 MMOL/L (ref 99–110)
CO2: 23 MMOL/L (ref 21–32)
CREAT SERPL-MCNC: 0.8 MG/DL (ref 0.6–1.1)
EOSINOPHILS ABSOLUTE: 0.1 K/UL (ref 0–0.6)
EOSINOPHILS RELATIVE PERCENT: 1 %
GFR AFRICAN AMERICAN: >60
GFR NON-AFRICAN AMERICAN: >60
GLUCOSE BLD-MCNC: 115 MG/DL (ref 70–99)
HCG QUALITATIVE: NEGATIVE
HCT VFR BLD CALC: 46.1 % (ref 36–48)
HEMOGLOBIN: 15.7 G/DL (ref 12–16)
LYMPHOCYTES ABSOLUTE: 3.2 K/UL (ref 1–5.1)
LYMPHOCYTES RELATIVE PERCENT: 24 %
MCH RBC QN AUTO: 28.9 PG (ref 26–34)
MCHC RBC AUTO-ENTMCNC: 34 G/DL (ref 31–36)
MCV RBC AUTO: 84.9 FL (ref 80–100)
MONOCYTES ABSOLUTE: 0.6 K/UL (ref 0–1.3)
MONOCYTES RELATIVE PERCENT: 4.4 %
NEUTROPHILS ABSOLUTE: 9.3 K/UL (ref 1.7–7.7)
NEUTROPHILS RELATIVE PERCENT: 69.9 %
PDW BLD-RTO: 13.6 % (ref 12.4–15.4)
PLATELET # BLD: 347 K/UL (ref 135–450)
PMV BLD AUTO: 7.4 FL (ref 5–10.5)
POTASSIUM REFLEX MAGNESIUM: 3.7 MMOL/L (ref 3.5–5.1)
RBC # BLD: 5.43 M/UL (ref 4–5.2)
SODIUM BLD-SCNC: 137 MMOL/L (ref 136–145)
WBC # BLD: 13.2 K/UL (ref 4–11)

## 2022-06-15 PROCEDURE — 70450 CT HEAD/BRAIN W/O DYE: CPT

## 2022-06-15 PROCEDURE — 6360000002 HC RX W HCPCS: Performed by: EMERGENCY MEDICINE

## 2022-06-15 PROCEDURE — 6360000002 HC RX W HCPCS: Performed by: PHYSICIAN ASSISTANT

## 2022-06-15 PROCEDURE — 85025 COMPLETE CBC W/AUTO DIFF WBC: CPT

## 2022-06-15 PROCEDURE — 84703 CHORIONIC GONADOTROPIN ASSAY: CPT

## 2022-06-15 PROCEDURE — 80048 BASIC METABOLIC PNL TOTAL CA: CPT

## 2022-06-15 PROCEDURE — 36415 COLL VENOUS BLD VENIPUNCTURE: CPT

## 2022-06-15 PROCEDURE — 99284 EMERGENCY DEPT VISIT MOD MDM: CPT

## 2022-06-15 PROCEDURE — 6370000000 HC RX 637 (ALT 250 FOR IP): Performed by: PHYSICIAN ASSISTANT

## 2022-06-15 PROCEDURE — 96365 THER/PROPH/DIAG IV INF INIT: CPT

## 2022-06-15 PROCEDURE — 96372 THER/PROPH/DIAG INJ SC/IM: CPT

## 2022-06-15 PROCEDURE — 2580000003 HC RX 258: Performed by: PHYSICIAN ASSISTANT

## 2022-06-15 RX ORDER — OXYCODONE HYDROCHLORIDE 5 MG/1
5 TABLET ORAL ONCE
Status: COMPLETED | OUTPATIENT
Start: 2022-06-15 | End: 2022-06-15

## 2022-06-15 RX ORDER — PROMETHAZINE HYDROCHLORIDE 25 MG/ML
25 INJECTION, SOLUTION INTRAMUSCULAR; INTRAVENOUS ONCE
Status: COMPLETED | OUTPATIENT
Start: 2022-06-15 | End: 2022-06-15

## 2022-06-15 RX ORDER — 0.9 % SODIUM CHLORIDE 0.9 %
1000 INTRAVENOUS SOLUTION INTRAVENOUS ONCE
Status: COMPLETED | OUTPATIENT
Start: 2022-06-15 | End: 2022-06-15

## 2022-06-15 RX ADMIN — HYDROMORPHONE HYDROCHLORIDE 1 MG: 1 INJECTION, SOLUTION INTRAMUSCULAR; INTRAVENOUS; SUBCUTANEOUS at 20:26

## 2022-06-15 RX ADMIN — SODIUM CHLORIDE 1000 ML: 9 INJECTION, SOLUTION INTRAVENOUS at 19:50

## 2022-06-15 RX ADMIN — PROMETHAZINE HYDROCHLORIDE 25 MG: 25 INJECTION INTRAMUSCULAR; INTRAVENOUS at 20:27

## 2022-06-15 RX ADMIN — Medication 12.5 MG: at 19:49

## 2022-06-15 RX ADMIN — OXYCODONE 5 MG: 5 TABLET ORAL at 21:44

## 2022-06-15 ASSESSMENT — PAIN DESCRIPTION - DESCRIPTORS: DESCRIPTORS: ACHING

## 2022-06-15 ASSESSMENT — ENCOUNTER SYMPTOMS
SHORTNESS OF BREATH: 0
NAUSEA: 1
VOMITING: 1
ABDOMINAL PAIN: 0
BACK PAIN: 0
SORE THROAT: 0

## 2022-06-15 ASSESSMENT — PAIN SCALES - GENERAL
PAINLEVEL_OUTOF10: 9
PAINLEVEL_OUTOF10: 9
PAINLEVEL_OUTOF10: 6
PAINLEVEL_OUTOF10: 6
PAINLEVEL_OUTOF10: 10

## 2022-06-15 ASSESSMENT — PAIN DESCRIPTION - LOCATION: LOCATION: HEAD

## 2022-06-15 ASSESSMENT — PAIN - FUNCTIONAL ASSESSMENT
PAIN_FUNCTIONAL_ASSESSMENT: 0-10

## 2022-06-16 NOTE — ED PROVIDER NOTES
[facility]  eMERGENCY dEPARTMENT eNCOUnter   Physician Attestation    Pt Name: Flaquito Schroeder  MRN: [id number]  [place] 1980  Date of evaluation: 6/15/22        Physician Note:    This patient was seen by the advanced practice provider. I have personally performed a face to face diagnostic evaluation on this patient and performed a substantive portion of the visit including all aspects of the medical decision making. History: Briefly, 46 y.o. female presents with presents with headache. Patient has a long history of headaches. Many years ago she had a  shunt placed in [place] [address]. She describes at one point requiring her son to be altered due to problems with a draining too much fluid. She moved up here couple years ago and has been having chronic headaches. She states she has been to the [facility] as well as [facility] and they did not want to \"touch her\". She states they told her she needed to go back to the neurosurgeon who put her shunt in but that is in [place]. Basically she is here complaining of persistent pain. She describes episodes where she states she has a seizure but it really does not sound like one as she does remember them. She states she gets numbness all over her face and feels like she is going to pass out but does not. Physical Exam  Vitals and nursing note reviewed. Constitutional:       Appearance: She is well-developed. She is not diaphoretic. HENT:      Head: Normocephalic and atraumatic. Right Ear: External ear normal.      Left Ear: External ear normal.   Eyes:      General: No scleral icterus. Right eye: No discharge. Left eye: No discharge. Conjunctiva/sclera: Conjunctivae normal.   Neck:      Trachea: No tracheal deviation. Pulmonary:      Effort: Pulmonary effort is normal. No respiratory distress. Breath sounds: No stridor.    Musculoskeletal: General: Normal range of motion. Cervical back: Normal range of motion. Skin:     General: Skin is warm and dry. Neurological:      Mental Status: She is alert and oriented to person, place, and time. Coordination: Coordination normal.   Psychiatric:         Behavior: Behavior normal.         MDM:     XR CHEST (2 VW)    Result Date: 6/14/2022  Site: [place] #: [id number] #: [id number]: Thor Bautista #: [de-identified] #: [id number] #: [id number]: XR CHEST PA AND LATERALExam Date/Time: 06/14/2022 07:15 PMAdmitting Diagnosis: syncopeReason for Exam: syncope Dictated by: Pal Michele CANDE: 06/14/2022 07:51 PMT: This document is confidential medical information. Unauthorized disclosure or use of this information is prohibited by law. If you are not the intended recipient of this document, please advise  us by calling immediately [phone]. Impression/Conclusion below HISTORY:   syncope syncope COMPARISON: 1/10/2022 NOTE:  If there are questions about the content of this report, please contact [address] [place] [address] radiology by calling [phone] FINDINGS: LINES/DEVICES: Ventriculoperitoneal shunt tubing with distal end curled in the right upper abdomen LUNGS/PLEURA:  Unremarkable HEART:  Unremarkable MEDIASTINUM/SARAH:  Unremarkable BONES:  Thoracic dextroscoliosis OTHER:  None  IMPRESSION: No significant acute abnormality SIGNED BY: Bob Shelby MD on 6/14/2022  7:47 PM   [facility] [phone] -  2011 [facility]: [phone]      CT Head WO Contrast    Result Date: 6/15/2022  EXAMINATION: CT OF THE HEAD WITHOUT CONTRAST  6/15/2022 9:09 pm TECHNIQUE: CT of the head was performed without the administration of intravenous contrast. Automated exposure control, iterative reconstruction, and/or weight based adjustment of the mA/kV was utilized to reduce the radiation dose to as low as reasonably achievable.  COMPARISON: September 26, 2020 HISTORY: ORDERING SYSTEM PROVIDED HISTORY: Persistent, chronic headache with history of  shunt, please compare for any changes compared to CT scans from June 13 and June 11. TECHNOLOGIST PROVIDED HISTORY: Reason for exam:->Persistent, chronic headache with history of  shunt, please compare for any changes compared to CT scans from June 13 and June 11. Has a \"code stroke\" or \"stroke alert\" been called? ->No Decision Support Exception - unselect if not a suspected or confirmed emergency medical condition->Emergency Medical Condition (MA) Is the patient pregnant?->No Reason for Exam: Persistent, chronic headache with history of  shunt, please compare for any changes compared to CT scans from June 13 and June 11. FINDINGS: BRAIN/VENTRICLES: There is no acute intracranial hemorrhage, mass effect or midline shift. No abnormal extra-axial fluid collection. The gray-white differentiation is maintained without evidence of an acute infarct. There is no evidence of hydrocephalus. Stable ventricular shunt inserted via the left frontal approach. ORBITS: The visualized portion of the orbits demonstrate no acute abnormality. SINUSES: The visualized paranasal sinuses and mastoid air cells demonstrate no acute abnormality. SOFT TISSUES/SKULL:  No acute abnormality of the visualized skull or soft tissues. No acute intracranial abnormality. Stable left frontal ventricular shunt.        Results for orders placed or performed during the hospital encounter of 06/15/22   CBC with Auto Differential   Result Value Ref Range    WBC 13.2 (H) 4.0 - 11.0 K/uL    RBC 5.43 (H) 4.00 - 5.20 M/uL    Hemoglobin 15.7 12.0 - 16.0 g/dL    Hematocrit 46.1 36.0 - 48.0 %    MCV 84.9 80.0 - 100.0 fL    MCH 28.9 26.0 - 34.0 pg    MCHC 34.0 31.0 - 36.0 g/dL    RDW 13.6 12.4 - 15.4 %    Platelets 490 667 - 500 K/uL    MPV 7.4 5.0 - 10.5 fL    Neutrophils % 69.9 %    Lymphocytes % 24.0 %    Monocytes % 4.4 %    Eosinophils % 1.0 % Basophils % 0.7 %    Neutrophils Absolute 9.3 (H) 1.7 - 7.7 K/uL    Lymphocytes Absolute 3.2 1.0 - 5.1 K/uL    Monocytes Absolute 0.6 0.0 - 1.3 K/uL    Eosinophils Absolute 0.1 0.0 - 0.6 K/uL    Basophils Absolute 0.1 0.0 - 0.2 K/uL   Basic Metabolic Panel w/ Reflex to MG   Result Value Ref Range    Sodium 137 136 - 145 mmol/L    Potassium reflex Magnesium 3.7 3.5 - 5.1 mmol/L    Chloride 100 99 - 110 mmol/L    CO2 23 21 - 32 mmol/L    Anion Gap 14 3 - 16    Glucose 115 (H) 70 - 99 mg/dL    BUN 15 7 - 20 mg/dL    CREATININE 0.8 0.6 - 1.1 mg/dL    GFR Non-African American >60 >60    GFR African American >60 >60    Calcium 9.8 8.3 - 10.6 mg/dL   HCG Qualitative, Serum   Result Value Ref Range    hCG Qual Negative Detects HCG level >10 MIU/mL       CRITICAL CARE  I personally saw the patient and independently provided 0 minutes of non-concurrent critical care out of the total shared critical care time provided. This excludes seperately billable procedures. Critical care time was provided for 0 that required close evaluation and/or intervention with concern for potential patient decompensation. Patient requested that I admit her however we do not have neurosurgery at this facility. She asked that I see if I could arrange her to see a specialist at [facility] or be admitted there. We does not have a reason for acute admission. I spoke to neurosurgery on-call at [facility] Dr. Daily Wright who did agree to see her in the office. 1. Chronic nonintractable headache, unspecified headache type          DISPOSITION/PLAN  PATIENT REFERRED TO:  [address], [facility]  [phone]    Call in 1 day  I spoke to this physician tonight and she agreed to see you in the office.     DISCHARGE MEDICATIONS:  Discharge Medication List as of 6/15/2022 10:02 PM            Marlene Arellano MD        This report has been produced using speech recognition software and may contain errors related to that system including errors in grammar, punctuation, and spelling, as well as words and phrases that may be inappropriate. If there are any questions or concerns please feel free to contact the dictating provider for clarification.        Tyler Cole MD  06/15/22 8602

## 2022-06-16 NOTE — ED PROVIDER NOTES
[address] [facility]      Pt Name: Amanda Carballo  MRN: [id number]  [place] 1980  Date of evaluation: 6/15/2022  Provider: Temo Stone PA-C    This patient was seen and evaluated by the attending physician Po Owen MD.    CHIEF COMPLAINT      Chief Complaint: headache      HISTORYOF PRESENT ILLNESS  (Location/Symptom, Timing/Onset, Context/Setting, Quality, Duration, Modifying Factors, Severity.)   Amanda Carballo is a 46 y.o. female who presents to the emergency department complaining of persistent headaches. Patient has a longstanding history of headaches and has a prior history of  shunt placement in [place]. This was revised a year ago. She says she is having the symptoms that she was having previously before the revision again. She says no one is taking her seriously. She has been evaluated at [facility] and with [place] and says that \"no one will touch me\" and that she was told to go back to [place] to see her neurosurgeon there. She does keep in contact with this neurosurgeon but she cannot afford to fly to [place] to see him. She says she called him today and told him her symptoms and he told her to go back to the emergency room. She describes a pressure in her head. Nothing makes it better or worse. She is in pain management and tried a Percocet but then vomited it. Nursing Notes were reviewed and I agree. REVIEW OF SYSTEMS    (2-9 systems for level 4, 10 or more forlevel 5)     Review of Systems   Constitutional: Negative for chills and fever. HENT: Negative for sore throat. Respiratory: Negative for shortness of breath. Cardiovascular: Negative for chest pain. Gastrointestinal: Positive for nausea and vomiting. Negative for abdominal pain. Genitourinary: Negative for difficulty urinating and dysuria. Musculoskeletal: Negative for back pain, neck pain and neck stiffness. Skin: Negative for rash. Neurological: Positive for headaches. Negative for light-headedness. Psychiatric/Behavioral: The patient is not nervous/anxious. All other systems reviewed and are negative. Positives and Pertinent negatives as per HPI. Except as noted above the remainder of the review of systems was reviewed and negative. PAST MEDICALHISTORY         Diagnosis Date    ADHD (attention deficit hyperactivity disorder) 82    Depression with anxiety     Diabetes mellitus ([facility] [address]     pre-diabetes    Difficult intubation     Encounter for imaging to screen for metal prior to MRI 2021    MRI Conditional Medtronic Non-Programmable shunt model#44476 implanted 10/30/2020 at [facility]. Normal Mode. 1.5T or 3.0T.    ESBL (extended spectrum beta-lactamase) producing bacteria infection 2019    urine    Functional ovarian cysts     rt ovary cyst x 2 yrs.     Headache(784.0)     migraines    History of blood transfusion     at birth   [facility] History of kidney stones     History of PCOS     Hydrocephalus ([facility] [address]     Hyperlipidemia     Irritable bowel syndrome 2004    had colonoscopy about 6 yrs ago    Meningitis     Neutrophilic leukocytosis     Nicotine dependence     PONV (postoperative nausea and vomiting)     very nauseated and sometimes wakes up with a Migraine--happened once after brain surgery    Primary osteoarthritis of left knee 2016    S/P cone biopsy of cervix     Scoliosis .s     (ventriculoperitoneal) shunt status     hydrocephalus f/w Neurosurgeon [facility]     (ventriculoperitoneal) shunt status     Wears glasses     reading       SURGICAL HISTORY           Procedure Laterality Date    ABDOMEN SURGERY N/A 2021    REMOVAL OF ABDOMINAL WALL MASS performed by Mathew Young MD at [address] [place] [address]      appendicitis     SECTION  2005    placenta previa    CHOLECYSTECTOMY      COLONOSCOPY  2004    COLPOSCOPY  2004  CSF SHUNT      replaced at age 17    CYSTOSCOPY      stone removal    HEMORRHOID SURGERY      HERNIA REPAIR Bilateral 1988    inguinal    HERNIA REPAIR  2015    hiatal hernia    HYSTERECTOMY, TOTAL ABDOMINAL (CERVIX REMOVED)  11/09/2016    TAHBSO, adhesions/PCOS    KNEE ARTHROSCOPY Left 1/7/2015    medial meniscectomy, chondroplasty, plica resection    LITHOTRIPSY Bilateral 12/10/2019    OTHER SURGICAL HISTORY  10/19/2016    op lap    VENTRICULOPERITONEAL SHUNT      multiple revisions, most recent 2015       CURRENT MEDICATIONS       Previous Medications    GABAPENTIN (NEURONTIN) 100 MG CAPSULE    PLEASE SEE ATTACHED FOR DETAILED DIRECTIONS    MECLIZINE (ANTIVERT) 12.5 MG TABLET    Take 1 tablet by mouth 3 times daily as needed for Dizziness or Nausea    OXYCODONE ER PO        SIMVASTATIN (ZOCOR) 10 MG TABLET    Take 1 tablet by mouth nightly    VYVANSE 50 MG CAPSULE    Take 1 capsule by mouth every morning for 30 days. Take 50 mg by mouth every morning.        ALLERGIES     Bee venom, Bentyl [dicyclomine hcl], Dicyclomine, Ketorolac tromethamine, Levofloxacin, [place], Mushroom extract complex, Oxycodone-acetaminophen, Sulfa antibiotics, Vancomycin, Adhesive tape, Dicyclomine hcl, Haldol [haloperidol], Shiitake mushroom, Sulfacetamide, Acetaminophen, Butalbital-apap-caff-cod, Ceftaroline, Daptomycin, Fosfomycin tromethamine, Ketamine, Methocarbamol, Nitrofurantoin, Prochlorperazine, Reglan [metoclopramide], Silicone, Tazobactam, and Zosyn [piperacillin sod-tazobactam so]    FAMILY HISTORY           Problem Relation Age of Onset    High Blood Pressure Mother     Diabetes Mother     High Cholesterol Mother     Depression Mother     Diabetes Maternal Grandmother     High Blood Pressure Maternal Grandmother     High Cholesterol Maternal Grandmother     Heart Disease Maternal Grandfather     High Blood Pressure Maternal Grandfather     Heart Disease Paternal Damari Iha Stroke Paternal Department Physician in the absence of a cardiologist. Please see their note for interpretation of EKG    RADIOLOGY:         Interpretation per the Radiologist below, if available at the time of this note:    CT Head WO Contrast   Final Result   No acute intracranial abnormality. Stable left frontal ventricular shunt. LABS:  Labs Reviewed   CBC WITH AUTO DIFFERENTIAL - Abnormal; Notable for the following components:       Result Value    WBC 13.2 (*)     RBC 5.43 (*)     Neutrophils Absolute 9.3 (*)     All other components within normal limits   BASIC METABOLIC PANEL W/ REFLEX TO MG FOR LOW K - Abnormal; Notable for the following components:    Glucose 115 (*)     All other components within normal limits   HCG, SERUM, QUALITATIVE       When ordered, only abnormal lab results are displayed. All other labs are within normal range or not returned as of the dictation. EMERGENCY DEPARTMENT COURSE and DIFFERENTIAL DIAGNOSIS/MDM:   Vitals:    Vitals:    06/15/22 1839 06/15/22 1951 06/15/22 2146 06/15/22 2202   BP: 133/75 (!) 124/95 119/89    Pulse: (!) 116 92 92    Resp: 16 19 18    Temp: 98.9 °F (37.2 °C)      TempSrc: Oral      SpO2: 97% 100% 97% 98%   Weight: 140 lb 14 oz (63.9 kg)          I saw this patient with Dr. Shawna Carlin who spent face-to-face time with the patient and agreed with my assessment and plan. The patient was stable and nontoxic appearing. Patient has no acute neurologic symptoms. No neck stiffness or fever. Her white blood cell count is normal.  H&H stable. Electrolytes normal.  Renal function normal.  She is not pregnant. This is chronic and unchanged from previous headaches. She was given IV fluids and Phenergan but then her IV infiltrated. She was then given IM Phenergan and IM Dilaudid. She is feeling a lot better. She requested a pain pill prior to discharge.   Dr. Shawna Carlin discussed the case with the neurosurgeon at [facility] Dr. Alexia Kinney who did asked that the patient get a head CT to rule out hemorrhage prior to discharge and that she will be happy to see the patient in the office. This was discussed with the patient. She expressed understanding. She was discharged in stable condition. Currently awaiting a ride. Is this patient to be included in the SEP-1 Core Measure due to severe sepsis or septic shock? No   Exclusion criteria - the patient is NOT to be included for SEP-1 Core Measure due to:  2+ SIRS criteria are not met      Discussed results, diagnosis and plan with patient and/or family. Questions addressed. Dispositionand follow-up agreed upon. Specific discharge instructions explained. The patient and/or family and I have discussed the diagnosis and risks, and we agree with discharging home to follow-up with their primary care,specialist or referral doctor. We also discussed returning to the Emergency Department immediately if new or worsening symptoms occur. We have discussed the symptoms which are most concerning that necessitate immediatereturn. PROCEDURES:  None    FINAL IMPRESSION      1. Chronic nonintractable headache, unspecified headache type          DISPOSITION/PLAN   DISPOSITION Discharge - Pending Orders Complete 06/15/2022 09:36:04 PM      PATIENT REFERRED TO:  Sky Obando  [address] [facility],[address] Floor, [address]  [phone]    Call in 1 day  I spoke to this physician tonight and she agreed to see you in the office.       MEDICATIONS:  New Prescriptions    No medications on file       (Please note that portions of this note were completed with a voice recognition program.  Efforts were made toedit the dictations but occasionally words are mis-transcribed.)    MICHELLE Flores PA-C  06/15/22 1316

## 2022-06-22 ENCOUNTER — HOSPITAL ENCOUNTER (EMERGENCY)
Age: 42
Discharge: HOME | End: 2022-06-23
Payer: COMMERCIAL

## 2022-06-22 ENCOUNTER — APPOINTMENT (OUTPATIENT)
Dept: CT IMAGING | Age: 42
End: 2022-06-22
Payer: COMMERCIAL

## 2022-06-22 DIAGNOSIS — N20.0 NEPHROLITHIASIS: Primary | ICD-10-CM

## 2022-06-22 DIAGNOSIS — N23 RENAL COLIC: ICD-10-CM

## 2022-06-22 LAB
ANION GAP SERPL CALCULATED.3IONS-SCNC: 14 MMOL/L (ref 3–16)
BASOPHILS ABSOLUTE: 0.1 K/UL (ref 0–0.2)
BASOPHILS RELATIVE PERCENT: 1.1 %
BILIRUBIN URINE: NEGATIVE
BLOOD, URINE: NEGATIVE
BUN BLDV-MCNC: 11 MG/DL (ref 7–20)
CALCIUM SERPL-MCNC: 9.8 MG/DL (ref 8.3–10.6)
CHLORIDE BLD-SCNC: 104 MMOL/L (ref 99–110)
CLARITY: CLEAR
CO2: 23 MMOL/L (ref 21–32)
COLOR: YELLOW
CREAT SERPL-MCNC: 0.7 MG/DL (ref 0.6–1.1)
EOSINOPHILS ABSOLUTE: 0.2 K/UL (ref 0–0.6)
EOSINOPHILS RELATIVE PERCENT: 1.6 %
GFR AFRICAN AMERICAN: >60
GFR NON-AFRICAN AMERICAN: >60
GLUCOSE BLD-MCNC: 120 MG/DL (ref 70–99)
GLUCOSE URINE: NEGATIVE MG/DL
HCT VFR BLD CALC: 42.8 % (ref 36–48)
HEMOGLOBIN: 14.7 G/DL (ref 12–16)
KETONES, URINE: NEGATIVE MG/DL
LACTIC ACID: 1.6 MMOL/L (ref 0.4–2)
LEUKOCYTE ESTERASE, URINE: NEGATIVE
LYMPHOCYTES ABSOLUTE: 2.9 K/UL (ref 1–5.1)
LYMPHOCYTES RELATIVE PERCENT: 26.6 %
MCH RBC QN AUTO: 28.4 PG (ref 26–34)
MCHC RBC AUTO-ENTMCNC: 34.3 G/DL (ref 31–36)
MCV RBC AUTO: 82.7 FL (ref 80–100)
MICROSCOPIC EXAMINATION: NORMAL
MONOCYTES ABSOLUTE: 0.5 K/UL (ref 0–1.3)
MONOCYTES RELATIVE PERCENT: 4.7 %
NEUTROPHILS ABSOLUTE: 7.1 K/UL (ref 1.7–7.7)
NEUTROPHILS RELATIVE PERCENT: 66 %
NITRITE, URINE: NEGATIVE
PDW BLD-RTO: 13.6 % (ref 12.4–15.4)
PH UA: 6.5 (ref 5–8)
PLATELET # BLD: 299 K/UL (ref 135–450)
PMV BLD AUTO: 7.9 FL (ref 5–10.5)
POTASSIUM REFLEX MAGNESIUM: 3.9 MMOL/L (ref 3.5–5.1)
PROTEIN UA: NEGATIVE MG/DL
RBC # BLD: 5.17 M/UL (ref 4–5.2)
SODIUM BLD-SCNC: 141 MMOL/L (ref 136–145)
SPECIFIC GRAVITY UA: 1.01 (ref 1–1.03)
URINE REFLEX TO CULTURE: NORMAL
URINE TYPE: NORMAL
UROBILINOGEN, URINE: 0.2 E.U./DL
WBC # BLD: 10.8 K/UL (ref 4–11)

## 2022-06-22 PROCEDURE — 83605 ASSAY OF LACTIC ACID: CPT

## 2022-06-22 PROCEDURE — 6370000000 HC RX 637 (ALT 250 FOR IP): Performed by: PHYSICIAN ASSISTANT

## 2022-06-22 PROCEDURE — 74176 CT ABD & PELVIS W/O CONTRAST: CPT

## 2022-06-22 PROCEDURE — 80048 BASIC METABOLIC PNL TOTAL CA: CPT

## 2022-06-22 PROCEDURE — 2580000003 HC RX 258: Performed by: PHYSICIAN ASSISTANT

## 2022-06-22 PROCEDURE — 96376 TX/PRO/DX INJ SAME DRUG ADON: CPT

## 2022-06-22 PROCEDURE — 6360000002 HC RX W HCPCS: Performed by: PHYSICIAN ASSISTANT

## 2022-06-22 PROCEDURE — 96375 TX/PRO/DX INJ NEW DRUG ADDON: CPT

## 2022-06-22 PROCEDURE — 36415 COLL VENOUS BLD VENIPUNCTURE: CPT

## 2022-06-22 PROCEDURE — 85025 COMPLETE CBC W/AUTO DIFF WBC: CPT

## 2022-06-22 PROCEDURE — 99284 EMERGENCY DEPT VISIT MOD MDM: CPT

## 2022-06-22 PROCEDURE — 81003 URINALYSIS AUTO W/O SCOPE: CPT

## 2022-06-22 PROCEDURE — 96374 THER/PROPH/DIAG INJ IV PUSH: CPT

## 2022-06-22 RX ORDER — PROMETHAZINE HYDROCHLORIDE 25 MG/1
25 TABLET ORAL ONCE
Status: COMPLETED | OUTPATIENT
Start: 2022-06-22 | End: 2022-06-22

## 2022-06-22 RX ORDER — 0.9 % SODIUM CHLORIDE 0.9 %
1000 INTRAVENOUS SOLUTION INTRAVENOUS ONCE
Status: COMPLETED | OUTPATIENT
Start: 2022-06-22 | End: 2022-06-22

## 2022-06-22 RX ORDER — DIPHENHYDRAMINE HYDROCHLORIDE 50 MG/ML
25 INJECTION INTRAMUSCULAR; INTRAVENOUS ONCE
Status: COMPLETED | OUTPATIENT
Start: 2022-06-22 | End: 2022-06-22

## 2022-06-22 RX ORDER — IBUPROFEN 400 MG/1
800 TABLET ORAL ONCE
Status: DISCONTINUED | OUTPATIENT
Start: 2022-06-22 | End: 2022-06-23 | Stop reason: HOSPADM

## 2022-06-22 RX ORDER — MORPHINE SULFATE 4 MG/ML
4 INJECTION, SOLUTION INTRAMUSCULAR; INTRAVENOUS ONCE
Status: COMPLETED | OUTPATIENT
Start: 2022-06-22 | End: 2022-06-22

## 2022-06-22 RX ORDER — ONDANSETRON 2 MG/ML
4 INJECTION INTRAMUSCULAR; INTRAVENOUS ONCE
Status: COMPLETED | OUTPATIENT
Start: 2022-06-22 | End: 2022-06-22

## 2022-06-22 RX ADMIN — ONDANSETRON 4 MG: 2 INJECTION INTRAMUSCULAR; INTRAVENOUS at 23:05

## 2022-06-22 RX ADMIN — MORPHINE SULFATE 4 MG: 4 INJECTION, SOLUTION INTRAMUSCULAR; INTRAVENOUS at 17:24

## 2022-06-22 RX ADMIN — HYDROMORPHONE HYDROCHLORIDE 0.5 MG: 1 INJECTION, SOLUTION INTRAMUSCULAR; INTRAVENOUS; SUBCUTANEOUS at 20:51

## 2022-06-22 RX ADMIN — DIPHENHYDRAMINE HYDROCHLORIDE 25 MG: 50 INJECTION, SOLUTION INTRAMUSCULAR; INTRAVENOUS at 17:26

## 2022-06-22 RX ADMIN — PROMETHAZINE HYDROCHLORIDE 25 MG: 25 TABLET ORAL at 18:38

## 2022-06-22 RX ADMIN — SODIUM CHLORIDE 1000 ML: 9 INJECTION, SOLUTION INTRAVENOUS at 17:26

## 2022-06-22 RX ADMIN — HYDROMORPHONE HYDROCHLORIDE 0.5 MG: 1 INJECTION, SOLUTION INTRAMUSCULAR; INTRAVENOUS; SUBCUTANEOUS at 23:05

## 2022-06-22 ASSESSMENT — PAIN DESCRIPTION - LOCATION
LOCATION: FLANK
LOCATION: FLANK
LOCATION: ABDOMEN

## 2022-06-22 ASSESSMENT — PAIN DESCRIPTION - PAIN TYPE
TYPE: ACUTE PAIN
TYPE: ACUTE PAIN

## 2022-06-22 ASSESSMENT — PAIN DESCRIPTION - ORIENTATION
ORIENTATION: RIGHT
ORIENTATION: RIGHT

## 2022-06-22 ASSESSMENT — PAIN - FUNCTIONAL ASSESSMENT: PAIN_FUNCTIONAL_ASSESSMENT: 0-10

## 2022-06-22 ASSESSMENT — PAIN DESCRIPTION - DESCRIPTORS
DESCRIPTORS: ACHING
DESCRIPTORS: ACHING
DESCRIPTORS: PRESSURE;SHARP

## 2022-06-22 ASSESSMENT — PAIN SCALES - GENERAL
PAINLEVEL_OUTOF10: 9
PAINLEVEL_OUTOF10: 8
PAINLEVEL_OUTOF10: 5
PAINLEVEL_OUTOF10: 9
PAINLEVEL_OUTOF10: 3

## 2022-06-22 ASSESSMENT — PAIN DESCRIPTION - FREQUENCY
FREQUENCY: CONTINUOUS
FREQUENCY: CONTINUOUS

## 2022-06-22 NOTE — ED NOTES
Resumed care from out going RN, introduce self to pt and updated on POC. Pt still complaining of flank pain rates it at 8/10, but reports her nausea is under control . Pt updated on POC, VS updated, please see flow sheets for full set.              Anna Blood RN  06/22/22 1927

## 2022-06-22 NOTE — ED PROVIDER NOTES
[address] [facility]        Pt Name: Vero Mitchell  MRN: [id number]  [place] 1980  Date of evaluation: 6/22/2022  Provider: CRISPIN Cortez  PCP: FREDRICK Araujo CNP  Note Started: 5:27 PM EDT     The ED Attending Physician was available for consultation but did not see or evaluate this patient. CHIEF COMPLAINT       Chief Complaint   Patient presents with    Abdominal Pain     reports hx of kidney stones. reports pain started last night. HISTORY OF PRESENT ILLNESS   (Location, Timing/Onset, Context/Setting, Quality, Duration, Modifying Factors, Severity, Associated Signs and Symptoms)  Note limiting factors. Chief Complaint: Right-sided abdominal pain, suspected kidney stone    Vero Mitchell is a 46 y.o. female who presents with complaints of right-sided flank pain. Says she has a history of kidney stones, is been several months since her last 1. Says symptoms began yesterday but got a lot worse overnight. She is nauseous and has vomited today, but she is also eaten a little bit. Takes Percocet at home, but this has not seemed to help. She says normally her kidney stones give her pain down in the groin area as well as the flank, but this time is just in the right flank. No left-sided symptoms. Says she is having a difficult time urinating but has not seen any blood and does not think she has a UTI. Denies fever. Nursing Notes were all reviewed and agreed with or any disagreements were addressed in the HPI. REVIEW OF SYSTEMS    (2-9 systems for level 4, 10 or more for level 5)     Review of Systems    Positives and pertinent negatives as per HPI.      PAST MEDICAL HISTORY     Past Medical History:   Diagnosis Date    ADHD (attention deficit hyperactivity disorder) 82    Depression with anxiety     Diabetes mellitus ([facility] [address]     pre-diabetes    Difficult intubation     Encounter for imaging to screen for metal prior to MRI 2021    MRI Conditional Medtronic Non-Programmable shunt model#12087 implanted 10/30/2020 at [facility]. Normal Mode. 1.5T or 3.0T.    ESBL (extended spectrum beta-lactamase) producing bacteria infection 2019    urine    Functional ovarian cysts 2008    rt ovary cyst x 2 yrs.     Headache(784.0)     migraines    History of blood transfusion     at birth   Rodriguez History of kidney stones     History of PCOS     Hydrocephalus ([address]     Hyperlipidemia     Irritable bowel syndrome 2004    had colonoscopy about 6 yrs ago    Meningitis     Neutrophilic leukocytosis     Nicotine dependence     PONV (postoperative nausea and vomiting)     very nauseated and sometimes wakes up with a Migraine--happened once after brain surgery    Primary osteoarthritis of left knee 2016    S/P cone biopsy of cervix     Scoliosis .s    Seizures ([address]      (ventriculoperitoneal) shunt status     hydrocephalus f/w Neuro[facility]geon [facility]     (ventriculoperitoneal) shunt status     Wears glasses     reading       SURGICAL HISTORY     Past Surgical History:   Procedure Laterality Date    ABDOMEN SURGERY N/A 2021    REMOVAL OF ABDOMINAL WALL MASS performed by Irma Chen MD at [address] [place] [address]      appendicitis     SECTION      placenta previa    CHOLECYSTECTOMY      COLONOSCOPY  2004    COLPOSCOPY      CSF SHUNT      replaced at age 17   Rodriguez CYSTOSCOPY      stone removal    HEMORRHOID SURGERY      HERNIA REPAIR Bilateral     inguinal    HERNIA REPAIR      hiatal hernia    HYSTERECTOMY, TOTAL ABDOMINAL (CERVIX REMOVED)  2016    TAHBSO, adhesions/PCOS    KNEE ARTHROSCOPY Left 2015    medial meniscectomy, chondroplasty, plica resection    LITHOTRIPSY Bilateral 12/10/2019    OTHER SURGICAL HISTORY  10/19/2016    op lap    VENTRICULOPERITONEAL SHUNT      multiple revisions, most recent  CURRENTMEDICATIONS       Previous Medications    GABAPENTIN (NEURONTIN) 100 MG CAPSULE    PLEASE SEE ATTACHED FOR DETAILED DIRECTIONS    MECLIZINE (ANTIVERT) 12.5 MG TABLET    Take 1 tablet by mouth 3 times daily as needed for Dizziness or Nausea    OXYCODONE ER PO        SIMVASTATIN (ZOCOR) 10 MG TABLET    Take 1 tablet by mouth nightly    VYVANSE 50 MG CAPSULE    Take 1 capsule by mouth every morning for 30 days. Take 50 mg by mouth every morning.        ALLERGIES     Bee venom, Bentyl [dicyclomine hcl], Dicyclomine, Ketorolac tromethamine, Levofloxacin, [place], Mushroom extract complex, Oxycodone-acetaminophen, Sulfa antibiotics, Vancomycin, Adhesive tape, Dicyclomine hcl, Haldol [haloperidol], Shiitake mushroom, Sulfacetamide, Acetaminophen, Butalbital-apap-caff-cod, Ceftaroline, Daptomycin, Fosfomycin tromethamine, Ketamine, Methocarbamol, Morphine, Nitrofurantoin, Prochlorperazine, Reglan [metoclopramide], Silicone, Tazobactam, and Zosyn [piperacillin sod-tazobactam so]    FAMILYHISTORY       Family History   Problem Relation Age of Onset    High Blood Pressure Mother     Diabetes Mother     High Cholesterol Mother     Depression Mother     Diabetes Maternal Grandmother     High Blood Pressure Maternal Grandmother     High Cholesterol Maternal Grandmother     Heart Disease Maternal Grandfather     High Blood Pressure Maternal Grandfather     Heart Disease Paternal Grandmother     Stroke Paternal Grandfather     Depression Sister     Cirrhosis Father     Rheum Arthritis Neg Hx     Osteoarthritis Neg Hx     Asthma Neg Hx     Breast Cancer Neg Hx     Cancer Neg Hx     Heart Failure Neg Hx     Hypertension Neg Hx     Migraines Neg Hx     Ovarian Cancer Neg Hx     Rashes/Skin Problems Neg Hx     Seizures Neg Hx     Thyroid Disease Neg Hx         SOCIAL HISTORY       Social History     Tobacco Use    Smoking status: Current Every Day Smoker     Packs/day: 0.50     Years: 18.00 Pack years: 9.00     Types: Cigarettes    Smokeless tobacco: Never Used   Vaping Use    Vaping Use: Never used   Substance Use Topics    Alcohol use: No     Alcohol/week: 0.0 standard drinks    Drug use: Never       SCREENINGS    [place] Coma Scale  Eye Opening: Spontaneous  Best Verbal Response: Oriented  Best Motor Response: Obeys commands  [place] Coma Scale Score: 15      PHYSICAL EXAM    (up to 7 for level 4, 8 or more for level 5)     ED Triage Vitals [06/22/22 1600]   BP Temp Temp Source Heart Rate Resp SpO2 Height Weight   133/87 98.2 °F (36.8 °C) Oral (!) 117 18 98 % 4' 11\" (1.499 m) 155 lb 10.3 oz (70.6 kg)       Physical Exam  Vitals and nursing note reviewed. Constitutional:       General: She is not in acute distress. Appearance: Normal appearance. She is not ill-appearing. HENT:      Head: Normocephalic and atraumatic. Nose: Nose normal.   Eyes:      General:         Right eye: No discharge. Left eye: No discharge. Cardiovascular:      Rate and Rhythm: Normal rate and regular rhythm. Heart sounds: Normal heart sounds. No murmur heard. No gallop. Pulmonary:      Effort: Pulmonary effort is normal. No respiratory distress. Breath sounds: Normal breath sounds. No stridor. No wheezing, rhonchi or rales. Abdominal:      General: Bowel sounds are normal. There is no distension. Palpations: Abdomen is soft. There is no mass. Tenderness: There is no abdominal tenderness. There is right CVA tenderness. There is no guarding or rebound. Musculoskeletal:         General: Normal range of motion. Cervical back: Normal range of motion. Skin:     General: Skin is warm and dry. Neurological:      General: No focal deficit present. Mental Status: She is alert and oriented to person, place, and time.    Psychiatric:         Mood and Affect: Mood normal.         Behavior: Behavior normal.         DIAGNOSTIC RESULTS   LABS:    Labs Reviewed   BASIC METABOLIC PANEL W/ REFLEX TO MG FOR LOW K - Abnormal; Notable for the following components:       Result Value    Glucose 120 (*)     All other components within normal limits   CBC WITH AUTO DIFFERENTIAL   URINALYSIS WITH REFLEX TO CULTURE   LACTIC ACID       When ordered only abnormal lab results are displayed. All other labs were within normal range or not returned as of this dictation. EKG: When ordered, EKG's are interpreted by the Emergency Department Physician in the absence of a cardiologist.  Please see their note for interpretation of EKG. RADIOLOGY:   Non-plain film images such as CT, Ultrasound and MRI are read by the radiologist. Plain radiographic images are visualized and preliminarily interpreted by the ED Provider with the below findings:    Interpretation per the Radiologist below, if available at the time of this note:    CT ABDOMEN PELVIS WO CONTRAST Additional Contrast? None   Final Result   1. Punctate nonobstructing right renal stone. 2.  No acute inflammatory process identified, within the limits of a   noncontrast exam.             CONSULTS:  None    PROCEDURES   Unless otherwise noted below, none.      Procedures    EMERGENCY DEPARTMENT COURSE and DIFFERENTIAL DIAGNOSIS/MDM:   Vitals:    Vitals:    06/22/22 2051 06/22/22 2253 06/22/22 2301 06/22/22 2305   BP:  119/87 126/86    Pulse:  68 68    Resp: 19 18 16 18   Temp:  98.3 °F (36.8 °C)     TempSrc:  Oral     SpO2:  98% 97%    Weight:       Height:           Patient was given the following medications:  Medications   ibuprofen (ADVIL;MOTRIN) tablet 800 mg (800 mg Oral Not Given 6/22/22 1726)   oxyCODONE-acetaminophen (PERCOCET) 5-325 MG per tablet 1 tablet (has no administration in time range)   0.9 % sodium chloride bolus (0 mLs IntraVENous Stopped 6/22/22 1848)   morphine (PF) injection 4 mg (4 mg IntraVENous Given 6/22/22 1724)   diphenhydrAMINE (BENADRYL) injection 25 mg (25 mg IntraVENous Given 6/22/22 1726)   promethazine (PHENERGAN) tablet 25 mg (25 mg Oral Given 6/22/22 1838)   HYDROmorphone (DILAUDID) injection 0.5 mg (0.5 mg IntraVENous Given 6/22/22 2051)   [facility]ron [facility]) injection 4 mg (4 mg IntraVENous Given 6/22/22 2305)   HYDROmorphone (DILAUDID) injection 0.5 mg (0.5 mg IntraVENous Given 6/22/22 2305)           Is this patient to be included in the SEP-1 Core Measure due to severe sepsis or septic shock? No   Exclusion criteria - the patient is NOT to be included for SEP-1 Core Measure due to: Infection is not suspected    Patient was tachycardic upon presentation but vital signs otherwise normal.  Her heart rate improved while in the ED. Laboratory work-up was reassuring, with normal urinalysis, no infection or hematuria, and normal kidney function, normal serum white blood cell count. CT scan the abdomen pelvis without contrast showed only nonobstructing nephrolithiasis. There is no indication for hospitalization or further work-up. Patient was given medications in the ED that she said improved her symptoms. She has pain medication at home. She will be discharged with instructions to follow-up with her urologist.  The patient verbalized understanding and agreement with this plan of care. The patient was advised to return to the emergency department if symptoms should significantly worsen or if new and concerning symptoms should appear. I estimate there is LOW risk for ACUTE APPENDICITIS, BOWEL OBSTRUCTION, CHOLECYSTITIS, DIVERTICULITIS, INCARCERATED HERNIA, PANCREATITIS, PERITONITIS, PELVIC INFLAMMATORY DISEASE, OVARIAN TORSION, PERFORATED BOWEL, BOWEL ISCHEMIA, CARDIAC ISCHEMIA, ECTOPIC PREGNANCY, TUBO-OVARIAN ABSCESS, or SEPSIS, thus I consider the discharge disposition reasonable. CRITICAL CARE TIME   None    FINAL IMPRESSION      1. Nephrolithiasis    2.  Renal colic          DISPOSITION/PLAN   DISPOSITION Decision To Discharge 06/23/2022 01:26:01 AM      PATIENT REFERRED TO:  your urologist    Schedule an appointment as soon as possible for a visit   For follow-up care      DISCHARGE MEDICATIONS:  New Prescriptions    No medications on file       DISCONTINUED MEDICATIONS:  Discontinued Medications    No medications on file            (Please note that portions of this note were completed with a voice recognition program.  Efforts were made to edit the dictations but occasionally words are mis-transcribed.)    CRISPIN Cortez (electronically signed)       Jacky Tuttle, [id number]  06/23/22 0128

## 2022-06-22 NOTE — CARE COORDINATION
Please be advised that this pt has had numerous scans and imaging completed in the past months. This writer is scheduled to meet with pt provider tomorrow to develop a care plan.

## 2022-06-22 NOTE — ED NOTES
Pt request pain and nausea medication, request promethazine for nausea , Abhishek ROA notified.       Tyrell Coppola RN  06/22/22 4165

## 2022-06-23 VITALS
OXYGEN SATURATION: 97 % | HEART RATE: 70 BPM | DIASTOLIC BLOOD PRESSURE: 72 MMHG | TEMPERATURE: 98.3 F | RESPIRATION RATE: 18 BRPM | WEIGHT: 155.65 LBS | HEIGHT: 59 IN | SYSTOLIC BLOOD PRESSURE: 130 MMHG | BODY MASS INDEX: 31.38 KG/M2

## 2022-06-23 RX ORDER — OXYCODONE HYDROCHLORIDE AND ACETAMINOPHEN 5; 325 MG/1; MG/1
1 TABLET ORAL ONCE
Status: DISCONTINUED | OUTPATIENT
Start: 2022-06-23 | End: 2022-06-23 | Stop reason: HOSPADM

## 2022-06-23 ASSESSMENT — PAIN SCALES - GENERAL
PAINLEVEL_OUTOF10: 3
PAINLEVEL_OUTOF10: 8

## 2022-06-23 NOTE — ED NOTES
Pt aware that she is getting discharged with follow up with her PCP. Pt agreeable to plan requesting  help getting home. Pt discharged back home, reviewed discharged instructions with pt follow up care , pain control and return precautions discussed , pt verbalized understanding. Arranged Lyft ride for pt, she ambulated out of the department with steady gait.           Hector Thacker RN  06/23/22 0129

## 2022-06-23 NOTE — ED NOTES
Pt updated on POC and delays. Pt understanding and agreeable at this time     [facility], RN  06/23/22 0148

## 2022-06-24 ENCOUNTER — APPOINTMENT (OUTPATIENT)
Dept: CT IMAGING | Age: 42
End: 2022-06-24
Payer: COMMERCIAL

## 2022-06-24 ENCOUNTER — HOSPITAL ENCOUNTER (EMERGENCY)
Age: 42
Discharge: HOME | End: 2022-06-24
Attending: EMERGENCY MEDICINE
Payer: COMMERCIAL

## 2022-06-24 VITALS
OXYGEN SATURATION: 99 % | DIASTOLIC BLOOD PRESSURE: 94 MMHG | TEMPERATURE: 98.9 F | HEART RATE: 90 BPM | RESPIRATION RATE: 18 BRPM | SYSTOLIC BLOOD PRESSURE: 134 MMHG

## 2022-06-24 DIAGNOSIS — R10.9 FLANK PAIN: Primary | ICD-10-CM

## 2022-06-24 LAB
ALBUMIN SERPL-MCNC: 4.4 G/DL (ref 3.4–5)
ALP BLD-CCNC: 108 U/L (ref 40–129)
ALT SERPL-CCNC: 9 U/L (ref 10–40)
ANION GAP SERPL CALCULATED.3IONS-SCNC: 12 MMOL/L (ref 3–16)
AST SERPL-CCNC: 11 U/L (ref 15–37)
BASOPHILS ABSOLUTE: 0.1 K/UL (ref 0–0.2)
BASOPHILS RELATIVE PERCENT: 0.4 %
BILIRUB SERPL-MCNC: 0.3 MG/DL (ref 0–1)
BILIRUBIN DIRECT: <0.2 MG/DL (ref 0–0.3)
BILIRUBIN URINE: NEGATIVE
BILIRUBIN, INDIRECT: ABNORMAL MG/DL (ref 0–1)
BLOOD, URINE: NEGATIVE
BUN BLDV-MCNC: 8 MG/DL (ref 7–20)
CALCIUM SERPL-MCNC: 9.6 MG/DL (ref 8.3–10.6)
CHLORIDE BLD-SCNC: 103 MMOL/L (ref 99–110)
CLARITY: CLEAR
CO2: 25 MMOL/L (ref 21–32)
COLOR: YELLOW
CREAT SERPL-MCNC: 0.6 MG/DL (ref 0.6–1.1)
EOSINOPHILS ABSOLUTE: 0.2 K/UL (ref 0–0.6)
EOSINOPHILS RELATIVE PERCENT: 1.2 %
GFR AFRICAN AMERICAN: >60
GFR NON-AFRICAN AMERICAN: >60
GLUCOSE BLD-MCNC: 117 MG/DL (ref 70–99)
GLUCOSE URINE: NEGATIVE MG/DL
HCT VFR BLD CALC: 41.8 % (ref 36–48)
HEMOGLOBIN: 14.3 G/DL (ref 12–16)
KETONES, URINE: NEGATIVE MG/DL
LEUKOCYTE ESTERASE, URINE: NEGATIVE
LIPASE: 29 U/L (ref 13–60)
LYMPHOCYTES ABSOLUTE: 2.6 K/UL (ref 1–5.1)
LYMPHOCYTES RELATIVE PERCENT: 20.6 %
MAGNESIUM: 2.3 MG/DL (ref 1.8–2.4)
MCH RBC QN AUTO: 28.7 PG (ref 26–34)
MCHC RBC AUTO-ENTMCNC: 34.3 G/DL (ref 31–36)
MCV RBC AUTO: 83.5 FL (ref 80–100)
MICROSCOPIC EXAMINATION: NORMAL
MONOCYTES ABSOLUTE: 0.7 K/UL (ref 0–1.3)
MONOCYTES RELATIVE PERCENT: 5.3 %
NEUTROPHILS ABSOLUTE: 9.1 K/UL (ref 1.7–7.7)
NEUTROPHILS RELATIVE PERCENT: 72.5 %
NITRITE, URINE: NEGATIVE
PDW BLD-RTO: 13.7 % (ref 12.4–15.4)
PH UA: 7.5 (ref 5–8)
PLATELET # BLD: 276 K/UL (ref 135–450)
PMV BLD AUTO: 8 FL (ref 5–10.5)
POTASSIUM REFLEX MAGNESIUM: 3.1 MMOL/L (ref 3.5–5.1)
PROTEIN UA: NEGATIVE MG/DL
RBC # BLD: 5.01 M/UL (ref 4–5.2)
SODIUM BLD-SCNC: 140 MMOL/L (ref 136–145)
SPECIFIC GRAVITY UA: 1.01 (ref 1–1.03)
TOTAL PROTEIN: 7.2 G/DL (ref 6.4–8.2)
URINE REFLEX TO CULTURE: NORMAL
URINE TYPE: NORMAL
UROBILINOGEN, URINE: 0.2 E.U./DL
WBC # BLD: 12.6 K/UL (ref 4–11)

## 2022-06-24 PROCEDURE — 80076 HEPATIC FUNCTION PANEL: CPT

## 2022-06-24 PROCEDURE — 6370000000 HC RX 637 (ALT 250 FOR IP): Performed by: EMERGENCY MEDICINE

## 2022-06-24 PROCEDURE — 99284 EMERGENCY DEPT VISIT MOD MDM: CPT

## 2022-06-24 PROCEDURE — 74176 CT ABD & PELVIS W/O CONTRAST: CPT

## 2022-06-24 PROCEDURE — 81003 URINALYSIS AUTO W/O SCOPE: CPT

## 2022-06-24 PROCEDURE — 85025 COMPLETE CBC W/AUTO DIFF WBC: CPT

## 2022-06-24 PROCEDURE — 2580000003 HC RX 258: Performed by: EMERGENCY MEDICINE

## 2022-06-24 PROCEDURE — 36415 COLL VENOUS BLD VENIPUNCTURE: CPT

## 2022-06-24 PROCEDURE — 96374 THER/PROPH/DIAG INJ IV PUSH: CPT

## 2022-06-24 PROCEDURE — 80048 BASIC METABOLIC PNL TOTAL CA: CPT

## 2022-06-24 PROCEDURE — 83735 ASSAY OF MAGNESIUM: CPT

## 2022-06-24 PROCEDURE — 83690 ASSAY OF LIPASE: CPT

## 2022-06-24 PROCEDURE — 6360000002 HC RX W HCPCS: Performed by: EMERGENCY MEDICINE

## 2022-06-24 RX ORDER — POTASSIUM CHLORIDE 20 MEQ/1
40 TABLET, EXTENDED RELEASE ORAL ONCE
Status: DISCONTINUED | OUTPATIENT
Start: 2022-06-24 | End: 2022-06-24 | Stop reason: HOSPADM

## 2022-06-24 RX ORDER — ONDANSETRON 2 MG/ML
4 INJECTION INTRAMUSCULAR; INTRAVENOUS ONCE
Status: COMPLETED | OUTPATIENT
Start: 2022-06-24 | End: 2022-06-24

## 2022-06-24 RX ORDER — 0.9 % SODIUM CHLORIDE 0.9 %
1000 INTRAVENOUS SOLUTION INTRAVENOUS ONCE
Status: COMPLETED | OUTPATIENT
Start: 2022-06-24 | End: 2022-06-24

## 2022-06-24 RX ORDER — POTASSIUM CHLORIDE 750 MG/1
40 TABLET, FILM COATED, EXTENDED RELEASE ORAL ONCE
Status: COMPLETED | OUTPATIENT
Start: 2022-06-24 | End: 2022-06-24

## 2022-06-24 RX ORDER — OXYCODONE HYDROCHLORIDE AND ACETAMINOPHEN 5; 325 MG/1; MG/1
1 TABLET ORAL ONCE
Status: COMPLETED | OUTPATIENT
Start: 2022-06-24 | End: 2022-06-24

## 2022-06-24 RX ADMIN — OXYCODONE HYDROCHLORIDE AND ACETAMINOPHEN 1 TABLET: 5; 325 TABLET ORAL at 04:38

## 2022-06-24 RX ADMIN — ONDANSETRON 4 MG: 2 INJECTION INTRAMUSCULAR; INTRAVENOUS at 04:37

## 2022-06-24 RX ADMIN — SODIUM CHLORIDE 1000 ML: 9 INJECTION, SOLUTION INTRAVENOUS at 04:42

## 2022-06-24 RX ADMIN — POTASSIUM CHLORIDE 40 MEQ: 750 TABLET, FILM COATED, EXTENDED RELEASE ORAL at 06:34

## 2022-06-24 ASSESSMENT — PAIN DESCRIPTION - LOCATION: LOCATION: FLANK

## 2022-06-24 ASSESSMENT — PAIN - FUNCTIONAL ASSESSMENT
PAIN_FUNCTIONAL_ASSESSMENT: 0-10

## 2022-06-24 ASSESSMENT — ENCOUNTER SYMPTOMS
EYE DISCHARGE: 0
VOMITING: 0
COLOR CHANGE: 0
ABDOMINAL PAIN: 0
SHORTNESS OF BREATH: 0
CONSTIPATION: 0
COUGH: 0
EYE ITCHING: 0

## 2022-06-24 ASSESSMENT — LIFESTYLE VARIABLES: HOW OFTEN DO YOU HAVE A DRINK CONTAINING ALCOHOL: NEVER

## 2022-06-24 ASSESSMENT — PAIN DESCRIPTION - ORIENTATION: ORIENTATION: RIGHT

## 2022-06-24 ASSESSMENT — PAIN SCALES - GENERAL
PAINLEVEL_OUTOF10: 8
PAINLEVEL_OUTOF10: 10
PAINLEVEL_OUTOF10: 9
PAINLEVEL_OUTOF10: 10

## 2022-06-24 NOTE — ED TRIAGE NOTES
Patient roomed in ED and states that she was seen yesterday for kidney stones. She stated that the prescribed Percocet is not enough for her pain, and she still has not passed any stones. Patient rates right side flank pain as 10/10. Patient resting in bed with unlabored breathing on continuous cardiac and SpO2 monitor.

## 2022-06-24 NOTE — ED NOTES
Patient educated on medication, follow up and what would alert her to return to ED. Educated not to drive/operate heavy machinery since she took the narcotic pain medication, verbalizes understanding.      Merari Herzog RN  06/24/22 6315

## 2022-06-24 NOTE — ED PROVIDER NOTES
[address] [facility]      Pt Name: Kavita Hayes  MRN: [id number]  [place] 1980  Date of evaluation: 6/24/2022  Provider: Adonis Mitchell MD    [address] [place] [address]       Chief Complaint   Patient presents with    Flank Pain     \"kidney stones since 2-3 days ago\"       Chandler Garcia is a 46 y.o. female who presents to the emergency department with flank pain. Right-sided flank pain radiating to the groin. History of kidney stones. 7 out of 10 sharp in nature. Worse with movement. Better with rest.  Was here 2 days ago and diagnosed with a nonobstructive stone. Positive for flank pain. Positive for vomiting. No constipation or diarrhea. No other associated symptoms. Nursing Notes were reviewed. Including nursing noted for FM, Surgical History, Past Medical History, Social History, vitals, and allergies; agree with all. REVIEW OF SYSTEMS       Review of Systems   Constitutional: Negative for diaphoresis and unexpected weight change. HENT: Negative for congestion and dental problem. Eyes: Negative for discharge and itching. Respiratory: Negative for cough and shortness of breath. Cardiovascular: Negative for chest pain and leg swelling. Gastrointestinal: Negative for abdominal pain, constipation and vomiting. Endocrine: Negative for cold intolerance and heat intolerance. Genitourinary: Positive for flank pain. Negative for vaginal bleeding, vaginal discharge and vaginal pain. Musculoskeletal: Negative for neck pain and neck stiffness. Skin: Negative for color change and pallor. Neurological: Negative for tremors and weakness. Psychiatric/Behavioral: Negative for agitation and behavioral problems. Except as noted above the remainder of the review of systems was reviewed and negative.      PAST MEDICAL HISTORY     Past Medical History:   Diagnosis Date    ADHD (attention deficit hyperactivity disorder) 82    Depression with anxiety     Diabetes mellitus ([address]     pre-diabetes    Difficult intubation     Encounter for imaging to screen for metal prior to MRI 2021    MRI Conditional Medtronic Non-Programmable shunt model#60942 implanted 10/30/2020 at [facility]. Normal Mode. 1.5T or 3.0T.    ESBL (extended spectrum beta-lactamase) producing bacteria infection 2019    urine    Functional ovarian cysts 2008    rt ovary cyst x 2 yrs.     Headache(784.0)     migraines    History of blood transfusion     at birth   [facility] History of kidney stones     History of PCOS     Hydrocephalus ([facility]     Hyperlipidemia     Irritable bowel syndrome 2004    had colonoscopy about 6 yrs ago    Meningitis     Neutrophilic leukocytosis     Nicotine dependence     PONV (postoperative nausea and vomiting)     very nauseated and sometimes wakes up with a Migraine--happened once after brain surgery    Primary osteoarthritis of left knee 2016    S/P cone biopsy of cervix 2004    Scoliosis .s    Seizures ([facility]      (ventriculoperitoneal) shunt status     hydrocephalus f/w Neurosurgeon at [facility]     (ventriculoperitoneal) shunt status     Wears glasses     reading       SURGICAL HISTORY       Past Surgical History:   Procedure Laterality Date    ABDOMEN SURGERY N/A 2021    REMOVAL OF ABDOMINAL WALL MASS performed by Joel Abbott MD at [place] [address]      appendicitis     SECTION  2005    placenta previa    CHOLECYSTECTOMY      COLONOSCOPY  2004    COLPOSCOPY      CSF SHUNT      replaced at age 17   [facility] CYSTOSCOPY      stone removal    HEMORRHOID SURGERY      HERNIA REPAIR Bilateral     inguinal    HERNIA REPAIR      hiatal hernia    HYSTERECTOMY, TOTAL ABDOMINAL (CERVIX REMOVED)  2016    TAHBSO, adhesions/PCOS    KNEE ARTHROSCOPY Left 2015    medial meniscectomy, chondroplasty, plica resection    LITHOTRIPSY Bilateral 12/10/2019    OTHER SURGICAL HISTORY  10/19/2016    op lap    VENTRICULOPERITONEAL SHUNT      multiple revisions, most recent 2015       CURRENT MEDICATIONS       Previous Medications    GABAPENTIN (NEURONTIN) 100 MG CAPSULE    PLEASE SEE ATTACHED FOR DETAILED DIRECTIONS    MECLIZINE (ANTIVERT) 12.5 MG TABLET    Take 1 tablet by mouth 3 times daily as needed for Dizziness or Nausea    OXYCODONE ER PO        SIMVASTATIN (ZOCOR) 10 MG TABLET    Take 1 tablet by mouth nightly    VYVANSE 50 MG CAPSULE    Take 1 capsule by mouth every morning for 30 days. Take 50 mg by mouth every morning.        ALLERGIES     Bee venom, Bentyl [dicyclomine hcl], Dicyclomine, Ketorolac tromethamine, Levofloxacin, [place], Mushroom extract complex, Oxycodone-acetaminophen, Sulfa antibiotics, Vancomycin, Adhesive tape, Dicyclomine hcl, Haldol [haloperidol], Shiitake mushroom, Sulfacetamide, Acetaminophen, Butalbital-apap-caff-cod, Ceftaroline, Daptomycin, Fosfomycin tromethamine, Ketamine, Methocarbamol, Morphine, Nitrofurantoin, Prochlorperazine, Reglan [metoclopramide], Silicone, Tazobactam, and Zosyn [piperacillin sod-tazobactam so]    FAMILY HISTORY        Family History   Problem Relation Age of Onset    High Blood Pressure Mother     Diabetes Mother     High Cholesterol Mother     Depression Mother     Diabetes Maternal Grandmother     High Blood Pressure Maternal Grandmother     High Cholesterol Maternal Grandmother     Heart Disease Maternal Grandfather     High Blood Pressure Maternal Grandfather     Heart Disease Paternal Grandmother     Stroke Paternal Grandfather     Depression Sister     Cirrhosis Father     Rheum Arthritis Neg Hx     Osteoarthritis Neg Hx     Asthma Neg Hx     Breast Cancer Neg Hx     Cancer Neg Hx     Heart Failure Neg Hx     Hypertension Neg Hx     Migraines Neg Hx     Ovarian Cancer Neg Hx     Rashes/Skin Problems Neg Hx     Seizures Neg Hx     Thyroid Disease Neg Hx        SOCIAL HISTORY       Social History     Socioeconomic History    Marital status: Single     Spouse name: None    Number of children: None    Years of education: None    Highest education level: None   Occupational History    Occupation: disability, SSI    Tobacco Use    Smoking status: Current Every Day Smoker     Packs/day: 0.50     Years: 18.00     Pack years: 9.00     Types: Cigarettes    Smokeless tobacco: Never Used   Vaping Use    Vaping Use: Never used   Substance and Sexual Activity    Alcohol use: No     Alcohol/week: 0.0 standard drinks    Drug use: Never    Sexual activity: Not Currently     Partners: Male   Other Topics Concern    None   Social History Narrative    None     Social Determinants of Health     Financial Resource Strain: Low Risk     Difficulty of Paying Living Expenses: Not hard at all   Food Insecurity: No Food Insecurity    Worried About Running Out of Food in the Last Year: Never true    [place] of Food in the Last Year: Never true   Transportation Needs:     Lack of Transportation (Medical): Not on file    Lack of Transportation (Non-Medical):  Not on file   Physical Activity:     Days of Exercise per Week: Not on file    Minutes of Exercise per Session: Not on file   Stress:     Feeling of Stress : Not on file   Social Connections:     Frequency of Communication with Friends and Family: Not on file    Frequency of Social Gatherings with Friends and Family: Not on file    Attends Jew Services: Not on file    Active Member of Clubs or Organizations: Not on file    Attends Club or Organization Meetings: Not on file    Marital Status: Not on file   Intimate Partner Violence:     Fear of Current or Ex-Partner: Not on file    Emotionally Abused: Not on file    Physically Abused: Not on file    Sexually Abused: Not on file   Housing Stability:     Unable to Pay for Housing in the Last Year: Not on file    Number of Places Lived in the Last Year: Not on file    Unstable Housing in the Last Year: Not on file       PHYSICAL EXAM       ED Triage Vitals [06/24/22 0409]   BP Temp Temp Source Heart Rate Resp SpO2 Height Weight   (!) 146/101 99 °F (37.2 °C) Oral 98 13 98 % -- --       Physical Exam  Vitals and nursing note reviewed. Constitutional:       General: She is not in acute distress. Appearance: She is well-developed. She is not ill-appearing, toxic-appearing or diaphoretic. HENT:      Head: Normocephalic and atraumatic. Right Ear: External ear normal.      Left Ear: External ear normal.   Eyes:      General:         Right eye: No discharge. Left eye: No discharge. Conjunctiva/sclera: Conjunctivae normal.      Pupils: Pupils are equal, round, and reactive to light. Cardiovascular:      Rate and Rhythm: Normal rate and regular rhythm. Heart sounds: No murmur heard. Pulmonary:      Effort: Pulmonary effort is normal. No respiratory distress. Breath sounds: Normal breath sounds. No wheezing or rales. Abdominal:      General: Bowel sounds are normal. There is no distension. Palpations: Abdomen is soft. There is no mass. Tenderness: There is no abdominal tenderness. There is no guarding or rebound. Genitourinary:     Comments: Deferred  Musculoskeletal:         General: No deformity. Normal range of motion. Cervical back: Normal range of motion and neck supple. Skin:     General: Skin is warm. Findings: No erythema or rash. Neurological:      Mental Status: She is alert and oriented to person, place, and time. She is not disoriented. Cranial Nerves: No cranial nerve deficit. Motor: No atrophy or abnormal muscle tone. Coordination: Coordination normal.   Psychiatric:         Behavior: Behavior normal.         Thought Content:  Thought content normal.         DIAGNOSTIC RESULTS     RADIOLOGY:   Non-plain film images such as CT, Ultrasoundand MRI are read by the radiologist. Plain radiographic images are visualized and preliminarily interpreted by the emergency physician with the below findings:    CT shows no change    ED BEDSIDE ULTRASOUND:   Performed by ED Physician - none    LABS:  Labs Reviewed   URINALYSIS WITH REFLEX TO CULTURE   CBC WITH AUTO DIFFERENTIAL   BASIC METABOLIC PANEL W/ REFLEX TO MG FOR LOW K   HEPATIC FUNCTION PANEL   LIPASE       All other labs were withinnormal range or not returned as of this dictation. EMERGENCY DEPARTMENT COURSE and DIFFERENTIAL DIAGNOSIS/MDM:     PMH, Surgical Hx, FH, Social Hx reviewed by myself (ETOH usage, Tobacco usage, Drug usage reviewed by myself, no pertinent Hx)- No Pertinent Hx     Old records were reviewed by me    42-year-old flank pain. Unclear etiology. Tolerating p.o. Pain well controlled. Well-appearing nontoxic. We will follow-up with urology and GI outpatient. I estimate there is LOW risk for Sepsis, MI, Stroke, Tamponade, PTX, Toxicity or other life threatening etiology thus I consider the discharge disposition reasonable. The patient is at low risk for mortality based on demographic, history and clinical factors. Given the best available information and clinical assessment, I estimate the risk of hospitalization to be greater than risk of treatment at home. I have explained to the patient that the risk could rapidly change, given precautions for return and instructions. Explained to patient that the risk for mortality is low based on demographic, history and clinical factors. I discussed with patient the results of evaluation in the ED, diagnosis, care, and prognosis. The plan is to discharge to home. Patient is in agreement with plan and questions have been answered. I also discussed with patient the reasons which may require a return visit and the importance of follow-up care. The patient is well-appearing, nontoxic, and improved at the time of discharge.   Patient agrees to call to arrange follow-up care as directed. Patient understands to return immediately for worsening/change in symptoms. CRITICAL CARE TIME   Total Critical Caretime was 0 minutes, excluding separately reportable procedures. There was a high probability of clinically significant/life threatening deterioration in the patient's condition which required my urgent intervention. PROCEDURES:  Unlessotherwise noted below, none    FINAL IMPRESSION      1.  Flank pain          DISPOSITION/PLAN   DISPOSITION      PATIENT REFERRED TO:  Daniel Lomax, [address]  [address] [place] [address]  [phone]    Call today        DISCHARGE MEDICATIONS:  New Prescriptions    No medications on file          (Please note that portions ofthis note were completed with a voice recognition program.  Efforts were made to edit the dictations but occasionally words are mis-transcribed.)    King Gibbs MD(electronically signed)  Attending Emergency Physician         iKng Gibbs MD  06/24/22 0871

## 2022-06-28 ENCOUNTER — TELEPHONE (OUTPATIENT)
Dept: EMERGENCY DEPT | Age: 42
End: 2022-06-28

## 2022-06-28 NOTE — TELEPHONE ENCOUNTER
leave shunt out of brain for some time to allow for the ventricles to expand. This writer did ask at one point during our conversation: how many \"No's\" should pt receive before exploring an alternative option  Pt stated that she would agree to alternative treatment by an allergist ie allergy shots or immune suppressants if she was given a \"No\" for surgery by both [facility] and . A \"No\" to operate has been given by [facility]. Pt states that she would not feel the need to explore [facility], [facility], and [place] neurosurgeon if both prior mentioned agencies stated that pt was a poor candidate for surgery. Pt did state that if she went back to [facility] to see past surgeon, she would not be billed for the corrective surgery. However, the distance of the travel would be too great of a barrier.  Neurosurgeon Kamran Sow MD  Pt is awaiting to hear from nurse outreach to schedule an appointment. Pt respects Dr. Yang Smith with high regards and would agree to and accept whatever outcome is decided upon. Pt states she has an upcoming appointment scheduled for the end of August with neurology at [facility]. Pt states she does not drive and mother does not currently have a vehicle. We can discuss medical transport through [address] [place] [address] (given 48 hours of notice through member services) during our next appointment. This may prove especially useful for possible upcoming appointments at [facility]. Pt was concerned regarding a call she received from [facility] this week stating she owes $20,000 for hysterectomy performed at [address] did send this writer several photos of facial rash and swelling by text message. A follow-up call will be given by this writer to pt on Tuesday July 5th afternoon.      [facility] [address]  ED   [phone]

## 2022-06-28 NOTE — TELEPHONE ENCOUNTER
Called pt, LM requesting call back. Worked with provider Madhu Morris to discuss possible solutions to increased pt ER visits and have information to share w pt.      Red Amaya [facility]   [phone]

## 2022-06-30 ENCOUNTER — TELEPHONE (OUTPATIENT)
Dept: EMERGENCY DEPT | Age: 42
End: 2022-06-30

## 2022-06-30 ENCOUNTER — CLINICAL DOCUMENTATION (OUTPATIENT)
Dept: EMERGENCY DEPT | Age: 42
End: 2022-06-30

## 2022-06-30 NOTE — PROGRESS NOTES
Placed a call to   [phone]    Spoke to respondent, Sudeep Justin. Gave information that patient is awaiting an appointment with Dr. Giovanni Rangel. Respondent stated that RN, also named Sudeep Justin would return my call. Respondent stated that pt is already established with Dr. Lai Pryor and permission is needed to visit with Dr. Giovanni Rangel. Prior note from :    Martha Pulliam - 06/16/2022 9:21 AM EDT  Formatting of this note might be different from the original.  Patient was in [facility] ER last night due to passing out a seizure activity from her shunt and they referred her to Giovanni Rangel and want her seen asap but I dont have any availability for her until 7/19. Caller requests return call at [phone].

## 2022-06-30 NOTE — TELEPHONE ENCOUNTER
This writer received text message from pt stating that she heard back from RN at [facility] stating that she was noncompliant with treatment and would not be able to make an appointment. This writer called  requesting Dr. Alva Mckeon office. Spoke to respondent, Matt Rodriguez. Matt Rodriguez stated that patient must first have the results of a silicone allergy test faxed to their office before she is seen by any provider. This writer called Dr. Nancy Coe office and spoke to respondent Emiliano Urbina. Emiliano Urbina stated that [facility] is unable to test for silicone allergy and that Dr. Merlin Everett referred Ms. Claudene Providence to [facility]. Ms. Claudene Providence stated that the $25 visit would have created a hardship. She is on a fixed income and receives benefits from [employer]. We discussed shanti application. [place] respondent, Kim Piedra at telephone number [phone] states that a referral must be received for services at [facility]. She states that she could not find a referral for pt in the system. This writer will call Dr. Nancy Coe office on Tuesday and request the referral from November 2021 be faxed to [facility] at [phone]. Once this testing is completed, pt should be able to schedule an appointment with  Neuroscience. Pt is aware that this writer will work on this on Tuesday. We have an appointment to talk by phone on Tuesday and will also complete a 3 way call to [address] [place] [address]. We will also further discuss a shanti application to cover allergy visits at [facility]. We will also discuss transportation to medical appointments through [address] [place] [address]. I will provide a teach-back on how to schedule through member services and the need to call at least 48 hours in advance of an appointment.

## 2022-07-05 ENCOUNTER — TELEPHONE (OUTPATIENT)
Dept: EMERGENCY DEPT | Age: 42
End: 2022-07-05

## 2022-07-07 ENCOUNTER — FOLLOWUP TELEPHONE ENCOUNTER (OUTPATIENT)
Dept: EMERGENCY DEPT | Age: 42
End: 2022-07-07

## 2022-07-07 NOTE — TELEPHONE ENCOUNTER
This writer called Ms. Green at [facility]. No answer LM. Our goal for today was to call Paco on a 3-way call to re-establish services. This writer stated he will be available until 5PM today.  If no return call, he will call back again on Thursday 7/14 to retry the 3-way call with pt and Luz Robins and also to verify that allergist office has sent referral to make appointment at [address] [place] [address] testing for silicone allergy test.

## 2022-07-08 DIAGNOSIS — F90.9 ATTENTION DEFICIT HYPERACTIVITY DISORDER (ADHD), UNSPECIFIED ADHD TYPE: ICD-10-CM

## 2022-07-08 RX ORDER — LISDEXAMFETAMINE DIMESYLATE 50 MG
50 CAPSULE ORAL EVERY MORNING
Qty: 30 CAPSULE | Refills: 0 | Status: SHIPPED | OUTPATIENT
Start: 2022-07-08 | End: 2022-08-08 | Stop reason: SDUPTHER

## 2022-07-10 ENCOUNTER — APPOINTMENT (OUTPATIENT)
Dept: GENERAL RADIOLOGY | Age: 42
End: 2022-07-10
Payer: COMMERCIAL

## 2022-07-10 ENCOUNTER — APPOINTMENT (OUTPATIENT)
Dept: CT IMAGING | Age: 42
End: 2022-07-10
Payer: COMMERCIAL

## 2022-07-10 ENCOUNTER — HOSPITAL ENCOUNTER (EMERGENCY)
Age: 42
Discharge: HOME | End: 2022-07-11
Attending: EMERGENCY MEDICINE
Payer: COMMERCIAL

## 2022-07-10 DIAGNOSIS — R10.9 RIGHT FLANK PAIN: Primary | ICD-10-CM

## 2022-07-10 DIAGNOSIS — R74.8 ELEVATED ALKALINE PHOSPHATASE LEVEL: ICD-10-CM

## 2022-07-10 DIAGNOSIS — H61.22 LEFT EAR IMPACTED CERUMEN: ICD-10-CM

## 2022-07-10 LAB
BASOPHILS ABSOLUTE: 0.1 K/UL (ref 0–0.2)
BASOPHILS RELATIVE PERCENT: 0.8 %
EOSINOPHILS ABSOLUTE: 0.1 K/UL (ref 0–0.6)
EOSINOPHILS RELATIVE PERCENT: 0.7 %
HCT VFR BLD CALC: 44 % (ref 36–48)
HEMOGLOBIN: 15.1 G/DL (ref 12–16)
LYMPHOCYTES ABSOLUTE: 2.2 K/UL (ref 1–5.1)
LYMPHOCYTES RELATIVE PERCENT: 18.8 %
MCH RBC QN AUTO: 28.6 PG (ref 26–34)
MCHC RBC AUTO-ENTMCNC: 34.2 G/DL (ref 31–36)
MCV RBC AUTO: 83.7 FL (ref 80–100)
MONOCYTES ABSOLUTE: 0.5 K/UL (ref 0–1.3)
MONOCYTES RELATIVE PERCENT: 4.3 %
NEUTROPHILS ABSOLUTE: 8.7 K/UL (ref 1.7–7.7)
NEUTROPHILS RELATIVE PERCENT: 75.4 %
PDW BLD-RTO: 14.5 % (ref 12.4–15.4)
PLATELET # BLD: 366 K/UL (ref 135–450)
PMV BLD AUTO: 7.1 FL (ref 5–10.5)
RBC # BLD: 5.26 M/UL (ref 4–5.2)
WBC # BLD: 11.6 K/UL (ref 4–11)

## 2022-07-10 PROCEDURE — 93005 ELECTROCARDIOGRAM TRACING: CPT | Performed by: EMERGENCY MEDICINE

## 2022-07-10 PROCEDURE — 96374 THER/PROPH/DIAG INJ IV PUSH: CPT

## 2022-07-10 PROCEDURE — 6360000002 HC RX W HCPCS: Performed by: NURSE PRACTITIONER

## 2022-07-10 PROCEDURE — 96375 TX/PRO/DX INJ NEW DRUG ADDON: CPT

## 2022-07-10 PROCEDURE — 99285 EMERGENCY DEPT VISIT HI MDM: CPT

## 2022-07-10 PROCEDURE — 80076 HEPATIC FUNCTION PANEL: CPT

## 2022-07-10 PROCEDURE — 36415 COLL VENOUS BLD VENIPUNCTURE: CPT

## 2022-07-10 PROCEDURE — 81003 URINALYSIS AUTO W/O SCOPE: CPT

## 2022-07-10 PROCEDURE — 83690 ASSAY OF LIPASE: CPT

## 2022-07-10 PROCEDURE — 80048 BASIC METABOLIC PNL TOTAL CA: CPT

## 2022-07-10 PROCEDURE — 85025 COMPLETE CBC W/AUTO DIFF WBC: CPT

## 2022-07-10 PROCEDURE — 96376 TX/PRO/DX INJ SAME DRUG ADON: CPT

## 2022-07-10 PROCEDURE — 84702 CHORIONIC GONADOTROPIN TEST: CPT

## 2022-07-10 PROCEDURE — 70250 X-RAY EXAM OF SKULL: CPT

## 2022-07-10 RX ORDER — ONDANSETRON 2 MG/ML
4 INJECTION INTRAMUSCULAR; INTRAVENOUS ONCE
Status: COMPLETED | OUTPATIENT
Start: 2022-07-10 | End: 2022-07-11

## 2022-07-10 RX ORDER — FENTANYL CITRATE 50 UG/ML
50 INJECTION, SOLUTION INTRAMUSCULAR; INTRAVENOUS ONCE
Status: COMPLETED | OUTPATIENT
Start: 2022-07-10 | End: 2022-07-10

## 2022-07-10 RX ADMIN — FENTANYL CITRATE 50 MCG: 50 INJECTION, SOLUTION INTRAMUSCULAR; INTRAVENOUS at 23:59

## 2022-07-10 ASSESSMENT — PAIN SCALES - GENERAL
PAINLEVEL_OUTOF10: 9
PAINLEVEL_OUTOF10: 10

## 2022-07-10 ASSESSMENT — PAIN - FUNCTIONAL ASSESSMENT: PAIN_FUNCTIONAL_ASSESSMENT: 0-10

## 2022-07-10 ASSESSMENT — PAIN DESCRIPTION - LOCATION
LOCATION: ABDOMEN;HEAD
LOCATION: FLANK

## 2022-07-10 ASSESSMENT — PAIN DESCRIPTION - DESCRIPTORS: DESCRIPTORS: ACHING

## 2022-07-11 ENCOUNTER — APPOINTMENT (OUTPATIENT)
Dept: CT IMAGING | Age: 42
End: 2022-07-11
Payer: COMMERCIAL

## 2022-07-11 VITALS
SYSTOLIC BLOOD PRESSURE: 139 MMHG | HEART RATE: 126 BPM | RESPIRATION RATE: 18 BRPM | TEMPERATURE: 98.6 F | DIASTOLIC BLOOD PRESSURE: 94 MMHG | OXYGEN SATURATION: 99 % | BODY MASS INDEX: 31.35 KG/M2 | WEIGHT: 155.2 LBS

## 2022-07-11 LAB
ALBUMIN SERPL-MCNC: 4.5 G/DL (ref 3.4–5)
ALP BLD-CCNC: 130 U/L (ref 40–129)
ALT SERPL-CCNC: 21 U/L (ref 10–40)
ANION GAP SERPL CALCULATED.3IONS-SCNC: 14 MMOL/L (ref 3–16)
AST SERPL-CCNC: 14 U/L (ref 15–37)
BILIRUB SERPL-MCNC: 0.4 MG/DL (ref 0–1)
BILIRUBIN DIRECT: <0.2 MG/DL (ref 0–0.3)
BILIRUBIN URINE: NEGATIVE
BILIRUBIN, INDIRECT: ABNORMAL MG/DL (ref 0–1)
BLOOD, URINE: NEGATIVE
BUN BLDV-MCNC: 8 MG/DL (ref 7–20)
CALCIUM SERPL-MCNC: 9.9 MG/DL (ref 8.3–10.6)
CHLORIDE BLD-SCNC: 99 MMOL/L (ref 99–110)
CLARITY: CLEAR
CO2: 23 MMOL/L (ref 21–32)
COLOR: YELLOW
CREAT SERPL-MCNC: 0.5 MG/DL (ref 0.6–1.1)
EKG ATRIAL RATE: 115 BPM
EKG DIAGNOSIS: NORMAL
EKG P AXIS: 52 DEGREES
EKG P-R INTERVAL: 132 MS
EKG Q-T INTERVAL: 348 MS
EKG QRS DURATION: 68 MS
EKG QTC CALCULATION (BAZETT): 481 MS
EKG R AXIS: 10 DEGREES
EKG T AXIS: 47 DEGREES
EKG VENTRICULAR RATE: 115 BPM
GFR AFRICAN AMERICAN: >60
GFR NON-AFRICAN AMERICAN: >60
GLUCOSE BLD-MCNC: 120 MG/DL (ref 70–99)
GLUCOSE URINE: NEGATIVE MG/DL
GONADOTROPIN, CHORIONIC (HCG) QUANT: <5 MIU/ML
KETONES, URINE: NEGATIVE MG/DL
LEUKOCYTE ESTERASE, URINE: NEGATIVE
LIPASE: 14 U/L (ref 13–60)
MICROSCOPIC EXAMINATION: NORMAL
NITRITE, URINE: NEGATIVE
PH UA: 7 (ref 5–8)
POTASSIUM SERPL-SCNC: 3.3 MMOL/L (ref 3.5–5.1)
PROTEIN UA: NEGATIVE MG/DL
SODIUM BLD-SCNC: 136 MMOL/L (ref 136–145)
SPECIFIC GRAVITY UA: <=1.005 (ref 1–1.03)
TOTAL PROTEIN: 7.9 G/DL (ref 6.4–8.2)
URINE REFLEX TO CULTURE: NORMAL
URINE TYPE: NORMAL
UROBILINOGEN, URINE: 0.2 E.U./DL

## 2022-07-11 PROCEDURE — 93010 ELECTROCARDIOGRAM REPORT: CPT | Performed by: INTERNAL MEDICINE

## 2022-07-11 PROCEDURE — 74177 CT ABD & PELVIS W/CONTRAST: CPT

## 2022-07-11 PROCEDURE — 6360000002 HC RX W HCPCS: Performed by: NURSE PRACTITIONER

## 2022-07-11 PROCEDURE — 6360000004 HC RX CONTRAST MEDICATION: Performed by: EMERGENCY MEDICINE

## 2022-07-11 RX ORDER — FENTANYL CITRATE 50 UG/ML
50 INJECTION, SOLUTION INTRAMUSCULAR; INTRAVENOUS ONCE
Status: COMPLETED | OUTPATIENT
Start: 2022-07-11 | End: 2022-07-11

## 2022-07-11 RX ORDER — ONDANSETRON 2 MG/ML
4 INJECTION INTRAMUSCULAR; INTRAVENOUS ONCE
Status: COMPLETED | OUTPATIENT
Start: 2022-07-11 | End: 2022-07-11

## 2022-07-11 RX ADMIN — ONDANSETRON 4 MG: 2 INJECTION INTRAMUSCULAR; INTRAVENOUS at 01:51

## 2022-07-11 RX ADMIN — FENTANYL CITRATE 50 MCG: 50 INJECTION, SOLUTION INTRAMUSCULAR; INTRAVENOUS at 01:06

## 2022-07-11 RX ADMIN — IOPAMIDOL 75 ML: 755 INJECTION, SOLUTION INTRAVENOUS at 00:17

## 2022-07-11 RX ADMIN — ONDANSETRON 4 MG: 2 INJECTION INTRAMUSCULAR; INTRAVENOUS at 00:01

## 2022-07-11 ASSESSMENT — PAIN - FUNCTIONAL ASSESSMENT: PAIN_FUNCTIONAL_ASSESSMENT: NONE - DENIES PAIN

## 2022-07-11 NOTE — ED NOTES
Pt discharged A&0 x 4, walking independently and breathing equal and easy on room air. Pt states her friend who was at bedside is her ride home and she's meeting her in the parking lot.       Jazmine Sandoval RN  07/11/22 9228

## 2022-07-11 NOTE — ED NOTES
Pt called out and stated that she was ready to leave, and no longer wanted her Left ear disimpacted. She stated her ride was in the parking lot.       Yvrose Silver RN  07/11/22 7296

## 2022-07-11 NOTE — ED PROVIDER NOTES
I PERSONALLY SAW THE PATIENT AND PERFORMED A SUBSTANTIVE PORTION OF THE VISIT INCLUDING ALL ASPECTS OF THE MEDICAL DECISION MAKING PROCESS. [address] [facility]      Pt Name: Hunter Almeida  MRN: [id number]  [place] 1980  Date of evaluation: 7/10/2022  Provider: Jayme Silva MD    [address] [place] [address]       Chief Complaint   Patient presents with    Flank Pain     hx of kidney stones. just had one a few weeks ago when she was here. doesnt think she passed it. worsening pain. no known fevers. pt added that she has a  shunt and reports new complications; can't hear out of left ear, seizures    Emesis       HISTORY OF PRESENT ILLNESS    Hunter Almeida is a 46 y.o. female who presents to the emergency department with multiple complaints. Patient presents with questionable kidney stone pain. Has history of kidney stones. Think she is passing a kidney stone. Also wants her shunt checked out. No constipation or diarrhea. No other associated symptoms    Nursing Notes were reviewed. Including nursing noted for FM, Surgical History, Past Medical History, Social History, vitals, and allergies; agree with all. REVIEW OF SYSTEMS       Review of Systems    Except as noted above the remainder of the review of systems was reviewed and negative. PAST MEDICAL HISTORY     Past Medical History:   Diagnosis Date    ADHD (attention deficit hyperactivity disorder) 82    Depression with anxiety     Diabetes mellitus ([facility]     pre-diabetes    Difficult intubation     Encounter for imaging to screen for metal prior to MRI 06/01/2021    MRI Conditional Medtronic Non-Programmable shunt model#[id number] implanted 10/30/2020 at [facility]. Normal Mode. 1.5T or 3.0T.    ESBL (extended spectrum beta-lactamase) producing bacteria infection 11/06/2019    urine    Functional ovarian cysts 2008    rt ovary cyst x 2 yrs.     Headache(784.0)     migraines    History of blood transfusion     at birth    History of kidney stones     History of PCOS     Hydrocephalus ([address]     Hyperlipidemia     Irritable bowel syndrome 2004    had colonoscopy about 6 yrs ago    Meningitis     Neutrophilic leukocytosis     Nicotine dependence     PONV (postoperative nausea and vomiting)     very nauseated and sometimes wakes up with a Migraine--happened once after brain surgery    Primary osteoarthritis of left knee 2016    S/P cone biopsy of cervix 2004    Scoliosis 1990.s    Seizures ([address]      (ventriculoperitoneal) shunt status     hydrocephalus f[facility]w [facility]geon [facility]     (ventriculoperitoneal) shunt status     Wears glasses     reading       SURGICAL HISTORY       Past Surgical History:   Procedure Laterality Date    ABDOMEN SURGERY N/A 2021    REMOVAL OF ABDOMINAL WALL MASS performed by Tommy Ware MD at [address] [place] [address]      appendicitis     SECTION      placenta previa    CHOLECYSTECTOMY      COLONOSCOPY      COLPOSCOPY      CSF SHUNT      replaced at age 17   [facility]  CYSTOSCOPY      stone removal    HEMORRHOID SURGERY      HERNIA REPAIR Bilateral     inguinal    HERNIA REPAIR      hiatal hernia    HYSTERECTOMY, TOTAL ABDOMINAL (CERVIX REMOVED)  2016    TAHBSO, adhesions/PCOS    KNEE ARTHROSCOPY Left 2015    medial meniscectomy, chondroplasty, plica resection    LITHOTRIPSY Bilateral 12/10/2019    OTHER SURGICAL HISTORY  10/19/2016    op lap    VENTRICULOPERITONEAL SHUNT      multiple revisions, most recent        CURRENT MEDICATIONS       Previous Medications    GABAPENTIN (NEURONTIN) 100 MG CAPSULE    PLEASE SEE ATTACHED FOR DETAILED DIRECTIONS    OXYCODONE ER PO        SIMVASTATIN (ZOCOR) 10 MG TABLET    Take 1 tablet by mouth nightly    VYVANSE 50 MG CAPSULE    Take 1 capsule by mouth every morning for 30 days. Take 50 mg by mouth every morning.        ALLERGIES Bee venom, Bentyl [dicyclomine hcl], Dicyclomine, Ketorolac tromethamine, Levofloxacin, [place], Mushroom extract complex, Oxycodone-acetaminophen, Sulfa antibiotics, Vancomycin, Adhesive tape, Dicyclomine hcl, Haldol [haloperidol], Shiitake mushroom, Sulfacetamide, Acetaminophen, Butalbital-apap-caff-cod, Ceftaroline, Daptomycin, Fosfomycin tromethamine, Ketamine, Methocarbamol, Morphine, Nitrofurantoin, Prochlorperazine, Reglan [metoclopramide], Silicone, Tazobactam, and Zosyn [piperacillin sod-tazobactam so]    FAMILY HISTORY        Family History   Problem Relation Age of Onset    High Blood Pressure Mother     Diabetes Mother     High Cholesterol Mother     Depression Mother     Diabetes Maternal Grandmother     High Blood Pressure Maternal Grandmother     High Cholesterol Maternal Grandmother     Heart Disease Maternal Grandfather     High Blood Pressure Maternal Grandfather     Heart Disease Paternal Grandmother     Stroke Paternal Grandfather     Depression Sister     Cirrhosis Father     Rheum Arthritis Neg Hx     Osteoarthritis Neg Hx     Asthma Neg Hx     Breast Cancer Neg Hx     Cancer Neg Hx     Heart Failure Neg Hx     Hypertension Neg Hx     Migraines Neg Hx     Ovarian Cancer Neg Hx     Rashes/Skin Problems Neg Hx     Seizures Neg Hx     Thyroid Disease Neg Hx        SOCIAL HISTORY       Social History     Socioeconomic History    Marital status: Single     Spouse name: Not on file    Number of children: Not on file    Years of education: Not on file    Highest education level: Not on file   Occupational History    Occupation: disability, SSI    Tobacco Use    Smoking status: Current Every Day Smoker     Packs/day: 0.50     Years: 18.00     Pack years: 9.00     Types: Cigarettes    Smokeless tobacco: Never Used   Vaping Use    Vaping Use: Never used   Substance and Sexual Activity    Alcohol use: No     Alcohol/week: 0.0 standard drinks    Drug use: Never    Sexual activity: Not Currently     Partners: Male   Other Topics Concern    Not on file   Social History Narrative    Not on file     Social Determinants of Health     Financial Resource Strain: Low Risk     Difficulty of Paying Living Expenses: Not hard at all   Food Insecurity: No Food Insecurity    Worried About Running Out of Food in the Last Year: Never true    [address] in the Last Year: Never true   Transportation Needs:     Lack of Transportation (Medical): Not on file    Lack of Transportation (Non-Medical): Not on file   Physical Activity:     Days of Exercise per Week: Not on file    Minutes of Exercise per Session: Not on file   Stress:     Feeling of Stress : Not on file   Social Connections:     Frequency of Communication with Friends and Family: Not on file    Frequency of Social Gatherings with Friends and Family: Not on file    Attends Latter-day Services: Not on file    Active Member of [employer] or Organizations: Not on file    Attends Club or Organization Meetings: Not on file    Marital Status: Not on file   Intimate Partner Violence:     Fear of Current or Ex-Partner: Not on file    Emotionally Abused: Not on file    Physically Abused: Not on file    Sexually Abused: Not on file   Housing Stability:     Unable to Pay for Housing in the Last Year: Not on file    Number of [place] in the Last Year: Not on file    Unstable Housing in the Last Year: Not on file       PHYSICAL EXAM       ED Triage Vitals [07/10/22 2208]   BP Temp Temp Source Heart Rate Resp SpO2 Height Weight   (!) 139/94 98.6 °F (37 °C) Oral (!) 126 20 99 % -- 155 lb 3.3 oz (70.4 kg)       Physical Exam  Vitals and nursing note reviewed. Constitutional:       General: She is not in acute distress. Appearance: She is well-developed. She is not ill-appearing, toxic-appearing or diaphoretic. HENT:      Head: Normocephalic and atraumatic.       Right Ear: External ear normal.      Left Ear: External ear normal.   Eyes:      General:         Right eye: No discharge. Left eye: No discharge. Conjunctiva/sclera: Conjunctivae normal.      Pupils: Pupils are equal, round, and reactive to light. Cardiovascular:      Rate and Rhythm: Regular rhythm. Tachycardia present. Heart sounds: No murmur heard. Pulmonary:      Effort: Pulmonary effort is normal. No respiratory distress. Breath sounds: Normal breath sounds. No wheezing or rales. Abdominal:      General: Bowel sounds are normal. There is no distension. Palpations: Abdomen is soft. There is no mass. Tenderness: There is no abdominal tenderness. There is no guarding or rebound. Genitourinary:     Comments: Deferred  Musculoskeletal:         General: No deformity. Normal range of motion. Cervical back: Normal range of motion and neck supple. Skin:     General: Skin is warm. Findings: No erythema or rash. Neurological:      Mental Status: She is alert and oriented to person, place, and time. She is not disoriented. Cranial Nerves: No cranial nerve deficit. Motor: No atrophy or abnormal muscle tone. Coordination: Coordination normal.   Psychiatric:         Behavior: Behavior normal.         Thought Content:  Thought content normal.         DIAGNOSTIC RESULTS     RADIOLOGY:   Non-plain film images such as CT, Ultrasoundand MRI are read by the radiologist. Plain radiographic images are visualized and preliminarily interpreted by the emergency physician with the below findings:    Imaging shows no acute process    ED BEDSIDE ULTRASOUND:   Performed by ED Physician - none    LABS:  Labs Reviewed   CBC WITH AUTO DIFFERENTIAL - Abnormal; Notable for the following components:       Result Value    WBC 11.6 (*)     RBC 5.26 (*)     Neutrophils Absolute 8.7 (*)     All other components within normal limits   BASIC METABOLIC PANEL - Abnormal; Notable for the following components:    Potassium 3.3 (*) Glucose 120 (*)     CREATININE 0.5 (*)     All other components within normal limits   HEPATIC FUNCTION PANEL - Abnormal; Notable for the following components:    Alkaline Phosphatase 130 (*)     AST 14 (*)     All other components within normal limits   URINALYSIS WITH REFLEX TO CULTURE   LIPASE   HCG, QUANTITATIVE, PREGNANCY       All other labs were withinnormal range or not returned as of this dictation. EMERGENCY DEPARTMENT COURSE and DIFFERENTIAL DIAGNOSIS/MDM:     PMH, Surgical Hx, FH, Social Hx reviewed by myself (ETOH usage, Tobacco usage, Drug usage reviewed by myself, no pertinent Hx)- No Pertinent Hx     Old records were reviewed by me     MDM 19-year-old with multiple complaints. Shot in correct place. CT chronic. Tolerating p.o. Pain well controlled. Outpatient follow-up. Labs-   Diagnostic findings  Medications  Consults  Disposition    I estimate there is LOW risk for Sepsis, MI, Stroke, Tamponade, PTX, Toxicity or other life threatening etiology thus I consider the discharge disposition reasonable. The patient is at low risk for mortality based on demographic, history and clinical factors. Given the best available information and clinical assessment, I estimate the risk of hospitalization to be greater than risk of treatment at home. I have explained to the patient that the risk could rapidly change, given precautions for return and instructions. Explained to patient that the risk for mortality is low based on demographic, history and clinical factors. I discussed with patient the results of evaluation in the ED, diagnosis, care, and prognosis. The plan is to discharge to home. Patient is in agreement with plan and questions have been answered. I also discussed with patient the reasons which may require a return visit and the importance of follow-up care. The patient is well-appearing, nontoxic, and improved at the time of discharge.   Patient agrees to call to arrange follow-up care as directed. Patient understands to return immediately for worsening/change in symptoms. I PERSONALLY SAW THE PATIENT AND PERFORMED A SUBSTANTIVE PORTION OF THE VISIT INCLUDING ALL ASPECTS OF THE MEDICAL DECISION MAKING PROCESS. The primary clinician of record [facility]   Total Critical Caretime was 21 minutes, excluding separately reportable procedures. There was a high probability of clinically significant/life threatening deterioration in the patient's condition which required my urgent intervention. CRITICAL CARE  I personally saw the patient and independently provided 21 minutes of non-concurrent critical care out of the total shared critical care time provided. This excludes seperately billable procedures. Critical care time was provided for patient as above that required close evaluation and/or intervention with concern for potential patient decompensation. PROCEDURES:  Unlessotherwise noted below, none    FINAL IMPRESSION      1. Right flank pain    2. Left ear impacted cerumen    3.  Elevated alkaline phosphatase level          DISPOSITION/PLAN   DISPOSITION Discharge - Pending Orders Complete 07/11/2022 01:25:52 AM    PATIENT REFERRED TO:  [address]  [address]            DISCHARGE MEDICATIONS:  New Prescriptions    No medications on file          (Please note that portions ofthis note were completed with a voice recognition program.  Efforts were made to edit the dictations but occasionally words are mis-transcribed.)    Hoa Hernandez MD(electronically signed)  Attending Emergency Physician            Hoa Hernandez MD  07/11/22 6799

## 2022-07-13 ENCOUNTER — CLINICAL DOCUMENTATION (OUTPATIENT)
Dept: EMERGENCY DEPT | Age: 42
End: 2022-07-13

## 2022-07-13 ASSESSMENT — ENCOUNTER SYMPTOMS
ABDOMINAL PAIN: 0
ANAL BLEEDING: 0
BACK PAIN: 0
EYE PAIN: 0
VOMITING: 1
NAUSEA: 1
COUGH: 0
SHORTNESS OF BREATH: 0
SORE THROAT: 0

## 2022-07-13 NOTE — PROGRESS NOTES
Called Dr. Cande Perez office. An allergy referral was faxed by staff to [address] [place] [address] on 7/10/2022. This writer will advise pt on our next scheduled call on Thursday.

## 2022-07-14 ENCOUNTER — TELEPHONE (OUTPATIENT)
Dept: EMERGENCY DEPT | Age: 42
End: 2022-07-14

## 2022-07-14 NOTE — TELEPHONE ENCOUNTER
This writer called [facility] a few minutes after 3PM for our scheduled weekly call to discuss case progress. Duration of call: 40 minutes    Goals for call: schedule appointments for both [facility] and [facility]. [facility] has stated that they do not have openings at the All[facility] Clinic this year. Registrar transferred call to [address] [place] [address]. Left message on the 3-way call with patient, requesting a call back from RN. Concepcion 5  [phone]  Option 1    Nothing was mentioned about fully booked this year when this writer called 2 weeks ago and spoke with staff, Mary Macias. Mary Macias stated she would accept the referral from Dr. Jesus Flynn and schedule once received. Desiree Montero RN is available at [phone]  8AM-5PM  Tues/Thrs    3-way call to [address] [place] [address]. Pt was able to re-establish with provider for talk therapy. Thursday August 18  1:20PM with Karyle Oms    Pt states that if she cannot get into the allergy clinic for testing at [facility], she would like to explore [facility]. Pt states [facility] uses services through [facility]. Her old provider was Dr. Js Finley (unknown spelling at this time) before the physicians [facility]. Pt states she discontinued with [facility] due to caresource not covered. Pt states she is feeling better today compared to her last hospital visit. She could not hear out of her left ear. She states this aligns to the area where her shunt is placed. Pt believes that her shunt located in abdomen is tangled, causing her stomach to puff out in a bloated manner. She experiences pain in the abdomen in the upper right quadrant. Pt states she was able to remove the left side cerumen impaction on her own with a hot bath and a bulb syringe. Pt states that staff at [facility] stated apprx one year ago that her shunt is coiled in the abdomen.      We agreed to a follow up call next Thursday, 7/21 at [address] [place] [address].    By the time of the

## 2022-07-14 NOTE — ED PROVIDER NOTES
[address]  [address] [address]  Dept: [phone]  Loc: [facility]        I have seen and evaluated this patient with my supervising physician, Dr. Traci Avendano. CHIEF COMPLAINT    Chief Complaint   Patient presents with    Flank Pain     hx of kidney stones. just had one a few weeks ago when she was here. doesnt think she passed it. worsening pain. no known fevers. pt added that she has a  shunt and reports new complications; can't hear out of left ear, seizures    Emesis       CLINT Zhao Boy is a 46 y.o. female who presents with right flank pain. Onset was 2 weeks ago. The duration has been constant since the onset. The patient has had associated nausea, vomiting x4, diaphoresis, \"pressure\" 8/10 headache, and complains of decreased hearing acuity on left. .  The flank pain severity is 9/10. The quality is sharp. There are no aggravating or alleviating factors. Denies chest pain, dizziness, presyncope, shortness of breath, fever, chills, cough, change in ability to smell/taste, leg/calf pain or swelling, body aches, diarrhea, or urinary symptoms/retention. No other complaints of. She does have a history of kidney stones. She has an appointment with  neurology on 8/30/2022 as her headaches are chronic. She is in pain management and states that her home Percocet is not helping with her discomfort. She is concerned about her  shunt and feels that her decreased hearing acuity is related to a complication at this time. Review of Systems   Constitutional: Positive for diaphoresis. Negative for chills, fatigue and fever. HENT: Negative for congestion, hearing loss (+ Decreased hearing acuity on left.) and sore throat. Eyes: Negative for pain and visual disturbance. Respiratory: Negative for cough and shortness of breath.     Cardiovascular: Negative for chest pain and leg swelling. Gastrointestinal: Positive for nausea and vomiting. Negative for abdominal pain and anal bleeding. Genitourinary: Positive for flank pain. Negative for difficulty urinating, dysuria, frequency and urgency. Musculoskeletal: Negative for back pain and neck pain. Skin: Negative for rash and wound. Neurological: Positive for headaches. Negative for dizziness and light-headedness. PAST MEDICAL & SURGICAL HISTORY    Past Medical History:   Diagnosis Date    ADHD (attention deficit hyperactivity disorder) 82    Depression with anxiety     Diabetes mellitus ([address]     pre-diabetes    Difficult intubation     Encounter for imaging to screen for metal prior to MRI 06/01/2021    MRI Conditional Medtronic Non-Programmable shunt model#64542 implanted 10/30/2020 at [facility]. Normal Mode. 1.5T or 3.0T.    ESBL (extended spectrum beta-lactamase) producing bacteria infection 11/06/2019    urine    Functional ovarian cysts 2008    rt ovary cyst x 2 yrs.     Headache(784.0)     migraines    History of blood transfusion     at birth   Augustus Hwang History of kidney stones     History of PCOS     Hydrocephalus ([address]     Hyperlipidemia     Irritable bowel syndrome 2004    had colonoscopy about 6 yrs ago    Meningitis 09/9230    Neutrophilic leukocytosis     Nicotine dependence     PONV (postoperative nausea and vomiting)     very nauseated and sometimes wakes up with a Migraine--happened once after brain surgery    Primary osteoarthritis of left knee 07/01/2016    S/P cone biopsy of cervix 2004    Scoliosis 1990.s    Seizures ([address]      (ventriculoperitoneal) shunt status 1982    hydrocephalus f/w [facility]geon [facility]     (ventriculoperitoneal) shunt status     Wears glasses     reading     Past Surgical History:   Procedure Laterality Date    ABDOMEN SURGERY N/A 7/14/2021    REMOVAL OF ABDOMINAL WALL MASS performed by Jakub Cueva MD at [address] [place] [address] APPENDECTOMY  2003    appendicitis     SECTION  2005    placenta previa    CHOLECYSTECTOMY      COLONOSCOPY  2004    COLPOSCOPY      CSF SHUNT      replaced at age 17    CYSTOSCOPY      stone removal    HEMORRHOID SURGERY      HERNIA REPAIR Bilateral     inguinal    HERNIA REPAIR      hiatal hernia    HYSTERECTOMY, TOTAL ABDOMINAL (CERVIX REMOVED)  2016    TAHBSO, adhesions/PCOS    KNEE ARTHROSCOPY Left 2015    medial meniscectomy, chondroplasty, plica resection    LITHOTRIPSY Bilateral 12/10/2019    OTHER SURGICAL HISTORY  10/19/2016    op lap    VENTRICULOPERITONEAL SHUNT      multiple revisions, most recent        CURRENT MEDICATIONS  (may include discharge medications prescribed in the ED)  Current Outpatient Rx   Medication Sig Dispense Refill    VYVANSE 50 MG capsule Take 1 capsule by mouth every morning for 30 days. Take 50 mg by mouth every morning. 30 capsule 0    OXYCODONE ER PO       gabapentin (NEURONTIN) 100 MG capsule PLEASE SEE ATTACHED FOR DETAILED DIRECTIONS      simvastatin (ZOCOR) 10 MG tablet Take 1 tablet by mouth nightly 90 tablet 1       ALLERGIES    Allergies   Allergen Reactions    Bee Venom Shortness Of Breath and Swelling    Bentyl [Dicyclomine Hcl] Shortness Of Breath and Anxiety    Dicyclomine Anxiety and Shortness Of Breath    Ketorolac Tromethamine Hives and Itching     Injectable only  Tolerates PO NSAIDs fine    Levofloxacin Anxiety and Hives    Maitake Anaphylaxis    Mushroom Extract Complex Anaphylaxis     Can't eat mushrooms.  Oxycodone-Acetaminophen Nausea And Vomiting     Tolerates Norco/Vicodin ok  Patient can not tolerate Percocet well.  Extreme vomiting      Sulfa Antibiotics Shortness Of Breath    Vancomycin Anaphylaxis and Hives    Adhesive Tape Dermatitis     Other reaction(s): Dermatitis    Dicyclomine Hcl     Haldol [Haloperidol]      \"Freaks Out\"    Shiitake Mushroom      Other reaction(s): Unknown    Sulfacetamide Hives    Acetaminophen Anxiety     Patient reports when taking acetaminophen (including Norco/Percocet) she gets severe anxiety when taking this medication.  Butalbital-Apap-Caff-Cod Anxiety    Ceftaroline Rash    Daptomycin Swelling and Rash    Fosfomycin Tromethamine Nausea And Vomiting    Ketamine Anxiety and Nausea And Vomiting    Methocarbamol Rash    Morphine Hives    Nitrofurantoin Nausea And Vomiting     \"projectile vomiting\"    Prochlorperazine Anxiety    Reglan [Metoclopramide] Anxiety    Silicone Hives, Rash and Swelling    Tazobactam Nausea And Vomiting and Anxiety    Zosyn [Piperacillin Sod-Tazobactam So] Hives     Also causes nausea, SOB, dizziness       SOCIAL HISTORY    Social History     Socioeconomic History    Marital status: Single     Spouse name: Not on file    Number of children: Not on file    Years of education: Not on file    Highest education level: Not on file   Occupational History    Occupation: disability, SSI    Tobacco Use    Smoking status: Current Every Day Smoker     Packs/day: 0.50     Years: 18.00     Pack years: 9.00     Types: Cigarettes    Smokeless tobacco: Never Used   Vaping Use    Vaping Use: Never used   Substance and Sexual Activity    Alcohol use: No     Alcohol/week: 0.0 standard drinks    Drug use: Never    Sexual activity: Not Currently     Partners: Male   Other Topics Concern    Not on file   Social History Narrative    Not on file     Social Determinants of Health     Financial Resource Strain: Low Risk     Difficulty of Paying Living Expenses: Not hard at all   Food Insecurity: No Food Insecurity    Worried About Running Out of Food in the Last Year: Never true    [place] of Food in the Last Year: Never true   Transportation Needs:     Lack of Transportation (Medical): Not on file    Lack of Transportation (Non-Medical):  Not on file   Physical Activity:     Days of Exercise per Week: Not on file    Minutes of Exercise per Session: Not on file   Stress:     Feeling of Stress : Not on file   Social Connections:     Frequency of Communication with Friends and Family: Not on file    Frequency of Social Gatherings with Friends and Family: Not on file    Attends Sikhism Services: Not on file    Active Member of [address] [place] [address] or Organizations: Not on file    Attends Club or Organization Meetings: Not on file    Marital Status: Not on file   Intimate Partner Violence:     Fear of Current or Ex-Partner: Not on file    Emotionally Abused: Not on file    Physically Abused: Not on file    Sexually Abused: Not on file   Housing Stability:     Unable to Pay for Housing in the Last Year: Not on file    Number of Jiavivamopatricia in the Last Year: Not on file    Unstable Housing in the Last Year: Not on file       PHYSICAL EXAM    VITAL SIGNS: BP (!) 139/94   Pulse (!) 126   Temp 98.6 °F (37 °C) (Oral)   Resp 18   Wt 155 lb 3.3 oz (70.4 kg)   LMP 10/31/2016 (Exact Date)   SpO2 99%   BMI 31.35 kg/m²    Constitutional:  Well developed, well nourished, patient appears uncomfortable  HENT:  Atraumatic, moist mucus membranes. + Complete cerumen impaction on the left.   Neck: No JVD, supple   Respiratory:  Lungs clear to auscultation bilaterally, no respiratory distress  Cardiovascular:  regular rhythm with [email or web address] [email or web address] bpm,  no murmurs, rubs, or gallops.  + S1-S2  GI:  Soft, no abdominal tenderness, no pulsatile masses  Musculoskeletal:  No edema, no acute deformities  Back: no soft tissue swelling, + right CVA tenderness   Vascular: DP pulses 2+ and equal bilaterally  Integument:  Skin is warm and dry   Neurologic: Awake, alert and oriented, motor 5/5 bilaterally, patellar reflexes are 2+ and equal bilaterally, sensation to light touch is intact in the groin and lower extremities bilaterally    RADIOLOGY/PROCEDURES    CT ABDOMEN PELVIS W IV CONTRAST Additional Contrast? None   Final Result   Previous cholecystectomy with mild prominence of the central biliary ducts   and dilatation of the common bile duct which is more apparent. There is no   filling defects seen in the common duct and this is probably just   postsurgical in etiology but is more prominent. Recommend correlating with   lab values and follow-up if indicated. Small renal stone right kidney which is unchanged with no hydronephrosis   urinary obstruction. Scattered diverticula along the left colon with no pericolonic inflammation      Status post hysterectomy with no pelvic mass. Shunt catheter along the right abdomen which is unchanged. Previous cholecystectomy         XR SHUNT SERIES PLACEMENT (<4 VIEWS)   Final Result   Shunt catheter tubing as above which appears intact and unchanged in position. ED COURSE & MEDICAL DECISION MAKING    Pertinent Labs & Imaging studies interpreted and reviewed. (See chart for details)  See chart for details of medications given during the ED stay. Vitals:    07/10/22 2208 07/11/22 0214   BP: (!) 139/94    Pulse: (!) 126    Resp: 20 18   Temp: 98.6 °F (37 °C)    TempSrc: Oral    SpO2: 99%    Weight: 155 lb 3.3 oz (70.4 kg)        Differential Diagnosis: Kidney Stone, Pyelonephritis, Renal Artery Aneurysm, Abdominal Aortic Aneurysm, Metastases to back, Mechanical Back Pain, Cauda Equina Syndrome    Patient who is well-known by nursing staff as well as my attending physician but not by myself presents emerged department with her reported typical complaints of flank pain and concern for  shunt malfunction with a headache, nausea, vomiting, and sweats. She is tachycardic at 126 bpm  and hypertensive. I will obtain urine reflex to culture, basic and abdominal labs, urine pregnancy, EKG, CT A/P to rule out stone, and XR shunt series to evaluate shunt. Work-up pending: Patient was medicated with Zofran, and fentanyl. For pain and nausea. Patient's left ear canal has a complete cerumen impaction.   This African American >60 >60    Calcium 9.9 8.3 - 10.6 mg/dL   Hepatic Function Panel   Result Value Ref Range    Total Protein 7.9 6.4 - 8.2 g/dL    Albumin 4.5 3.4 - 5.0 g/dL    Alkaline Phosphatase 130 (H) 40 - 129 U/L    ALT 21 10 - 40 U/L    AST 14 (L) 15 - 37 U/L    Total Bilirubin 0.4 0.0 - 1.0 mg/dL    Bilirubin, Direct <0.2 0.0 - 0.3 mg/dL    Bilirubin, Indirect see below 0.0 - 1.0 mg/dL   HCG, Quantitative, Pregnancy   Result Value Ref Range    hCG Quant <5.0 <5.0 mIU/mL   EKG 12 Lead   Result Value Ref Range    Ventricular Rate 115 BPM    Atrial Rate 115 BPM    P-R Interval 132 ms    QRS Duration 68 ms    Q-T Interval 348 ms    QTc Calculation (Bazett) 481 ms    P Axis 52 degrees    R Axis 10 degrees    T Axis 47 degrees    Diagnosis       Sinus tachycardiaabnormal R wave progressionProlonged QTAbnormal ECGWhen compared with ECG of 07-APR-2022 12:45,QT has lengthenedConfirmed by [address] ([id number]) on 7/11/2022 4:41:36 PM     Imaging reviewed:  CT ABDOMEN PELVIS W IV CONTRAST Additional Contrast? None    Result Date: 7/11/2022  Previous cholecystectomy with mild prominence of the central biliary ducts and dilatation of the common bile duct which is more apparent. There is no filling defects seen in the common duct and this is probably just postsurgical in etiology but is more prominent. Recommend correlating with lab values and follow-up if indicated. Small renal stone right kidney which is unchanged with no hydronephrosis urinary obstruction. Scattered diverticula along the left colon with no pericolonic inflammation Status post hysterectomy with no pelvic mass. Shunt catheter along the right abdomen which is unchanged. Previous cholecystectomy     XR SHUNT SERIES PLACEMENT (<4 VIEWS)    Result Date: 7/10/2022  Shunt catheter tubing as above which appears intact and unchanged in position.      Work-up reveals:  Urinalysis/culture negative for infection or other normality  BMP: Mild hypokalemia at 3.3, hyperglycemic at 1 120 mg/dL but otherwise unremarkable  LFT: Identifies elevated alkaline phosphatase at 138 and AST reduced at 14 but otherwise unremarkable  Lipase: [email or web address]  hCG qualitative: <5/negative pregnancy  CBC: Identifies mild leukocytosis with a WBC of 11.6 and elevated RBC at 5.26 and elevated neutrophils absolute 8.7 but otherwise unremarkable  EKG: Was obtained due to the patient's tachycardic [email or web address] bpm.  As interpreted by EMD, no ischemic changes with a tachycardic rate of 115 bpm  CT A/P: As noted above identifies no acute abnormality in the A/P. \"Previous cholecystectomy with mild prominence of the central biliary ducts and dilatation of the common bile duct which is more apparent. There is no filling defects seen in the common duct and this is probably just postsurgical in etiology but is more prominent. Recommend correlating with lab values and follow-up if indicated. Small renal stone right kidney which is unchanged with no hydronephrosis urinary obstruction. Scattered diverticula along the left colon with no pericolonic inflammation Status post hysterectomy with no pelvic mass. Shunt catheter along the right abdomen which is unchanged. Previous cholecystectomy\"    XR shunt series: As noted above: Shunt catheter tubing as above which appears intact and unchanged in position. Therefore, have low suspicion for the presence of emergent medical condition at this time and feel that the risk of additional laboratory testing, imaging, and/for admission outweighs any potential benefit at this time. However, the patient was given strict return precautions. Patient verbalized understanding scribbled plan for discharge and follow-up. Patient is instructed to continue her previously prescribed home medications. Strict return precautions. Patient verbalized understands agreeable to plan for discharge and follow-up with her PCP in the next 1-2 days.   Patient declines having her left cerumen impaction removed as she states her \"ride is here already\" and requested to be discharged immediately. I am informed of this shortly after patient's departure from the emergency department. FINAL IMPRESSION    1. Right flank pain    2. Left ear impacted cerumen    3.  Elevated alkaline phosphatase level        PLAN  Discharge with close outpatient follow-up (see EMR)       (Please note that this note was completed with a voice recognition program.  Every attempt was made to edit the dictations, but inevitably there remain words that are mis-transcribed.)       Imelda Palm, FREDRICK - AARON  07/13/22 5350

## 2022-07-19 ENCOUNTER — CLINICAL DOCUMENTATION (OUTPATIENT)
Dept: EMERGENCY DEPT | Age: 42
End: 2022-07-19

## 2022-07-19 NOTE — PROGRESS NOTES
This writer completed approximately 2 hour of phone calls on behalf of pt case. Then approximately 2 hours of field work. Called [facility] allergy and immunology clinic, respondent Minnie Thomas transferred this writer to [facility].     This writer then called another allergy department within . Respondent stated they do environmental allergy testing only at that location. This writer called UC office at [facility] Dr. Ivy Martin) inquired if there was an allergy department inside of this location. Respondent was unsure. Drove to location. No allergy office. This writer drove to [facility] allergy and spoke to medical assistant. MA stated that RN had been out of office for some time. Requested that a paper message be left for RN, requested callback. Call placed to Dr. Garcia Comes office. Requested a second referral due to unable to schedule with [facility]. Respondent stated she would speak to provider and return call to this writer with suggestion of alternate agency to silicone test.     This writer attempted to pull information for [facility] allergy testing but only information regarding Julia Reynolds MD was found. Call from [facility] later this afternoon. Contrary to prior information from registrar there are appointments available this year. Appointment scheduled for:   September 15th at 10:30AM  This was the next and soonest available. RN stated that this will be a new patient appointment and allergy testing will not necessarily be completed on the first visit. RN stated that physicians from the [facility] group will likely consult if a silicone test is possible. RN stated that pt cannot go directly to the [facility] group because [facility] but they do consult through . RN is to call pt to inform of new appointment. Stated that I am scheduled to call pt on 7/21 and will also share information with Meeta Kim [address] [place] [address] [facility]  [phone]

## 2022-07-21 ENCOUNTER — TELEPHONE (OUTPATIENT)
Dept: EMERGENCY DEPT | Age: 42
End: 2022-07-21

## 2022-07-21 NOTE — TELEPHONE ENCOUNTER
Weekly scheduled call to patient completed at 4pm.   Duration of conversation: approximately 7 minutes. Petrona states she is feeling okay today. She had just gotten back from taking her daughter to the dentist. She has a dental appointment herself tomorrow. Pt states she missed a GI appointment at [facility]19 with Dr. Damion Hines. She was concerned about exposure to covid and was still in quarantine. Pt states she cx appointment and will need to reschedule. This writer asked Petrona to call to reschedule but offered to provide assistance if there is any difficulty. Pt states she continues to feel stomach pain from the upper right abdomen. We discussed the appointment created for allergy at [facility]. Reminded pt to schedule with [facility] transport. Informed pt this writer will be leaving this position in August. Offered to hand off case to new . Pt agreed. [facility] has recommended that I hand off my cases to [id number] [facility] [facility]) to assume care. I will make the referral and inform pt of new  when the information is available. Once Petrona completes the new patient allergy appointment at [facility] in September, she should be allowed to schedule with  Neurosurgery once silicone test is completed and faxed to neurosurgery.     Our next scheduled call is Thursday, July 28th at [address] [place] [address]   [phone]

## 2022-07-27 DIAGNOSIS — R42 DIZZINESS: ICD-10-CM

## 2022-07-27 RX ORDER — PROMETHAZINE HYDROCHLORIDE 25 MG/1
25 TABLET ORAL 3 TIMES DAILY PRN
Qty: 30 TABLET | Refills: 0 | Status: SHIPPED | OUTPATIENT
Start: 2022-07-27 | End: 2022-08-06

## 2022-07-28 ENCOUNTER — HOSPITAL ENCOUNTER (EMERGENCY)
Age: 42
Discharge: HOME | End: 2022-07-28
Payer: COMMERCIAL

## 2022-07-28 ENCOUNTER — TELEPHONE (OUTPATIENT)
Dept: PRIMARY CARE CLINIC | Age: 42
End: 2022-07-28

## 2022-07-28 VITALS
HEART RATE: 130 BPM | WEIGHT: 149.47 LBS | TEMPERATURE: 99.5 F | SYSTOLIC BLOOD PRESSURE: 145 MMHG | OXYGEN SATURATION: 99 % | DIASTOLIC BLOOD PRESSURE: 95 MMHG | BODY MASS INDEX: 30.19 KG/M2 | RESPIRATION RATE: 16 BRPM

## 2022-07-28 DIAGNOSIS — R10.13 ABDOMINAL PAIN, EPIGASTRIC: Primary | ICD-10-CM

## 2022-07-28 DIAGNOSIS — Z53.21 ELOPED FROM EMERGENCY DEPARTMENT: ICD-10-CM

## 2022-07-28 PROCEDURE — 99281 EMR DPT VST MAYX REQ PHY/QHP: CPT

## 2022-07-28 RX ORDER — PROMETHAZINE HYDROCHLORIDE 25 MG/ML
25 INJECTION, SOLUTION INTRAMUSCULAR; INTRAVENOUS ONCE
Status: DISCONTINUED | OUTPATIENT
Start: 2022-07-28 | End: 2022-07-28 | Stop reason: HOSPADM

## 2022-07-28 RX ORDER — 0.9 % SODIUM CHLORIDE 0.9 %
1000 INTRAVENOUS SOLUTION INTRAVENOUS ONCE
Status: DISCONTINUED | OUTPATIENT
Start: 2022-07-28 | End: 2022-07-28

## 2022-07-28 NOTE — TELEPHONE ENCOUNTER
Pt called on call provider stating she was seen in the ED 7/22 for Abdominal pain RUQ per pt CT was good, pt has concerns as she is having continued nausea and vomiting even with the phenergan that was refilled by PCP yesterday, fever 102 off/on since ED visit, intermittent pale diarrhea and dark urine. She states had to cancel her GI appointment as other family members have [place]. Discussed - Push clear fluids without caffeine. Advance diet as tolerated, avoid greasy or spicy foods. May try giving OTC probiotic such as Culturelle or Florastor or yogurts with \"live and active cultures. \"  Discussed red flags and need for emergent care- Seek emergent care if symptoms of blood or mucous in stools or vomit, unable to keep down fluids, or otherwise worsens. Pt was scheduled with her PCP for a VV tomorrow as she is unable to get a ride to the office, pt was advised office may need to r/s as this was an add on appointment. They will notify her in the morning.      Note sent to PCP for review

## 2022-07-29 NOTE — ED PROVIDER NOTES
[address] [facility]        Pt Name: Shaina Donohue  MRN: [id number]  [place] 1980  Date of evaluation: 7/28/2022  Provider: Matthew Dyer PA-C  PCP: FREDRICK Driver CNP  Note Started: 12:49 AM EDT      ALDAIR. I have evaluated this patient. My supervising physician was available for consultation. Triage CHIEF COMPLAINT       Chief Complaint   Patient presents with    Abdominal Pain     On and off for a few weeks     Emesis         HISTORY OF PRESENT ILLNESS   (Location/Symptom, Timing/Onset, Context/Setting, Quality, Duration, Modifying Factors, Severity)  Note limiting factors. Chief Complaint: Nausea vomiting epigastric pain fever and not able to tolerate food or tablets    Shaina Donohue is a 46 y.o. female who presents stating that she has been sick for the last few days or so. She was seen a couple days ago at another emergency department and work-up did not reveal what was going on with her. But then yesterday today she has had more fever. She thinks she has potentially been exposed to COVID-19. She states that she was unable to take her pain medication today and is having a lot of pain in the upper epigastric region and vomits anytime that she tries to eat or drink. She states that she has vomited at least 5 times so far today. No generalized backache or extremity changes. She states that she does have a previous history of cholecystectomy as well as a shunt that drains into the abdomen. She has had various issues with the shunt in the past.  No difficulty urinating. No difficulty passing stool. No extremity acute change. Pain according to patient is relatively severe and constant and does not only radiate or change positions. It is worse with trying to eat and better at rest.  Nursing Notes were all reviewed and agreed with or any disagreements were addressed in the HPI.     REVIEW OF SYSTEMS    (2-9 systems for level 4, 10 or more for level 5)     Review of Systems  Positive history as above feeling feverish at home, no headache vision change neck pain or stiffness. No runny or stuffy nose or sore throat. No cough or congestion shortness of breath or chest pain. Positive for the abdominal symptoms as above with nausea and vomiting, no blood in emesis. No abdominal distention or difficulty passing stool or urine. No extremity acute weakness or loss of range of motion or strength. PAST MEDICAL HISTORY     Past Medical History:   Diagnosis Date    ADHD (attention deficit hyperactivity disorder) 1988    Depression with anxiety     Diabetes mellitus ([address]     pre-diabetes    Difficult intubation     Encounter for imaging to screen for metal prior to MRI 06/01/2021    MRI Conditional Medtronic Non-Programmable shunt model#60991 implanted 10/30/2020 at [facility]. Normal Mode. 1.5T or 3.0T. ESBL (extended spectrum beta-lactamase) producing bacteria infection 11/06/2019    urine    Functional ovarian cysts 2008    rt ovary cyst x 2 yrs.     Headache(784.0)     migraines    History of blood transfusion     at birth    History of kidney stones     History of PCOS     Hydrocephalus ([address]     Hyperlipidemia     Irritable bowel syndrome 2004    had colonoscopy about 6 yrs ago    Meningitis 21/1761    Neutrophilic leukocytosis     Nicotine dependence     PONV (postoperative nausea and vomiting)     very nauseated and sometimes wakes up with a Migraine--happened once after brain surgery    Primary osteoarthritis of left knee 07/01/2016    S/P cone biopsy of cervix 2004    Scoliosis 1990.s    Seizures ([address]      (ventriculoperitoneal) shunt status 1982    hydrocephalus f/w Neurosurgeon at [facility]     (ventriculoperitoneal) shunt status     Wears glasses     reading       SURGICAL HISTORY     Past Surgical History:   Procedure Laterality Date    ABDOMEN SURGERY N/A 7/14/2021    REMOVAL OF ABDOMINAL WALL MASS performed by Heidi Quintana MD at [facility]      appendicitis     SECTION  2005    placenta previa    CHOLECYSTECTOMY      COLONOSCOPY  2004    COLPOSCOPY      CSF SHUNT      replaced at age 17    CYSTOSCOPY      stone removal    HEMORRHOID SURGERY      HERNIA REPAIR Bilateral     inguinal    HERNIA REPAIR      hiatal hernia    HYSTERECTOMY, TOTAL ABDOMINAL (CERVIX REMOVED)  2016    TAHBSO, adhesions/PCOS    KNEE ARTHROSCOPY Left 2015    medial meniscectomy, chondroplasty, plica resection    LITHOTRIPSY Bilateral 12/10/2019    OTHER SURGICAL HISTORY  10/19/2016    op lap    VENTRICULOPERITONEAL SHUNT      multiple revisions, most recent        CURRENTMEDICATIONS       Discharge Medication List as of 2022  9:51 PM        CONTINUE these medications which have NOT CHANGED    Details   promethazine (PHENERGAN) 25 MG tablet Take 1 tablet by mouth 3 times daily as needed for Nausea, Disp-30 tablet, R-0Normal      VYVANSE 50 MG capsule Take 1 capsule by mouth every morning for 30 days.  Take 50 mg by mouth every morning., Disp-30 capsule, R-0, DAWNormal      OXYCODONE ER PO Historical Med      gabapentin (NEURONTIN) 100 MG capsule PLEASE SEE ATTACHED FOR DETAILED DIRECTIONSHistorical Med      simvastatin (ZOCOR) 10 MG tablet Take 1 tablet by mouth nightly, Disp-90 tablet, R-1Normal             ALLERGIES     Bee venom, Bentyl [dicyclomine hcl], Dicyclomine, Ketorolac tromethamine, Levofloxacin, [place], Mushroom extract complex, Oxycodone-acetaminophen, Sulfa antibiotics, Vancomycin, Adhesive tape, Dicyclomine hcl, Haldol [haloperidol], Shiitake mushroom, Sulfacetamide, Acetaminophen, Butalbital-apap-caff-cod, Ceftaroline, Daptomycin, Fosfomycin tromethamine, Ketamine, Methocarbamol, Morphine, Nitrofurantoin, Prochlorperazine, Reglan [metoclopramide], Silicone, Tazobactam, and Zosyn [piperacillin sod-tazobactam so]    FAMILYHISTORY       Family History diaphoretic. Comments: Conversational, smiling, giving her extensive medical history easily and readily, no dry heaving or emesis. HENT:      Head: Normocephalic and atraumatic. Right Ear: External ear normal.      Left Ear: External ear normal.      Nose: Nose normal.      Mouth/Throat:      Mouth: Mucous membranes are moist.      Pharynx: No posterior oropharyngeal erythema. Eyes:      General:         Right eye: No discharge. Left eye: No discharge. Conjunctiva/sclera: Conjunctivae normal.   Cardiovascular:      Rate and Rhythm: Regular rhythm. Tachycardia present. Pulses: Normal pulses. Heart sounds: Normal heart sounds. No murmur heard. No gallop. Pulmonary:      Effort: Pulmonary effort is normal. No respiratory distress. Breath sounds: Normal breath sounds. No wheezing, rhonchi or rales. Abdominal:      General: Bowel sounds are normal.      Palpations: Abdomen is soft. Tenderness: There is abdominal tenderness in the epigastric area. There is no guarding or rebound. Negative signs include Blue's sign and McBurney's sign. Comments: No increased tympany or distention   Musculoskeletal:         General: No swelling, tenderness, deformity or signs of injury. Normal range of motion. Cervical back: Normal range of motion and neck supple. No rigidity or tenderness. Lymphadenopathy:      Cervical: No cervical adenopathy. Skin:     General: Skin is warm and dry. Capillary Refill: Capillary refill takes less than 2 seconds. Findings: No rash. Neurological:      Mental Status: She is alert and oriented to person, place, and time. Mental status is at baseline. Sensory: No sensory deficit. Motor: No weakness.       Coordination: Coordination normal.      Gait: Gait normal.   Psychiatric:         Mood and Affect: Mood normal.         Behavior: Behavior normal.       DIAGNOSTIC RESULTS   LABS:    Labs Reviewed   COVID-19, RAPID COMPREHENSIVE METABOLIC PANEL   LIPASE   CBC WITH AUTO DIFFERENTIAL   URINALYSIS WITH REFLEX TO CULTURE       When ordered, only abnormal lab results are displayed. All other labs were within normal range or not returned as of this dictation. EKG: When ordered, EKG's are interpreted by the Emergency Department Physician in the absence of a cardiologist.  Please see their note for interpretation of EKG. RADIOLOGY:   Non-plain film images such as CT, Ultrasound and MRI are read by the radiologist. Plain radiographic images are visualized andpreliminarily interpreted by the  ED Provider with the below findings:        Interpretation perthe Radiologist below, if available at the time of this note:    No orders to display     No results found. PROCEDURES   Unless otherwise noted below, none     Procedures    CRITICAL CARE TIME   N/A    CONSULTS:  None      EMERGENCY DEPARTMENT COURSE and DIFFERENTIAL DIAGNOSIS/MDM:   Vitals:    Vitals:    07/28/22 1918   BP: (!) 145/95   Pulse: (!) 130   Resp: 16   Temp: 99.5 °F (37.5 °C)   TempSrc: Oral   SpO2: 99%   Weight: 149 lb 7.6 oz (67.8 kg)       Patient was given thefollowing medications:  Medications - No data to display      Is this patient to be included in the SEP-1 Core Measure due to severe sepsis or septic shock? No   Exclusion criteria - the patient is NOT to be included for SEP-1 Core Measure due to:  2+ SIRS criteria are not met  This patient presents as above and evaluation and treatment is begun here including medications ordered as well as labs. Electronic medical record reviewed and is quite helpful in the patient's care showing recent evaluation and treatment at another emergency department about 2 days ago. However today she has fever. COVID-19 test also ordered.   Sometime after this I was approached by nursing and asked to change orders to IM and followed by PO if tolerated as there is a lack of ER rooms I am told to place this patient in as she is not actively vomiting. Shortly after this I am contacted again by nursing asked me to come evaluate the patient as she does not want to be treated and placed in the [place] [address] waiting area but would rather just leave. I did come to evaluate the patient but unfortunately she had already eloped from the emergency department without further evaluation or plan formation. FINAL IMPRESSION      1. Abdominal pain, epigastric    2. Eloped from emergency department          DISPOSITION/PLAN   DISPOSITION Eloped - Left Before Treatment Complete 07/29/2022 12:48:43 AM      PATIENT REFERREDTO:  No follow-up provider specified.     DISCHARGE MEDICATIONS:  Discharge Medication List as of 7/28/2022  9:51 PM          DISCONTINUED MEDICATIONS:  Discharge Medication List as of 7/28/2022  9:51 PM                 (Please note that portions ofthis note were completed with a voice recognition program.  Efforts were made to edit the dictations but occasionally words are mis-transcribed.)    Tresa Baer PA-C (electronically signed)             Tresa Baer PA-C  07/29/22 9113

## 2022-08-02 ENCOUNTER — TELEPHONE (OUTPATIENT)
Dept: EMERGENCY DEPT | Age: 42
End: 2022-08-02

## 2022-08-02 NOTE — TELEPHONE ENCOUNTER
Called pt to let her know referral has been made for new CHW at [facility]. Pt states her last recent ER visit was due to right side stomach pain. When asked to give blood cultures, the ER technician stated \"Oops, wrong person\" after giving attempts to place needle in vein. Pt decided not to wait and had friend drive her to [facility]. They were on their way to [place] so friend could pack bag first. Mynor Chris had a syncopal episode in car so [facility] Sharri Bleacher. Petrona was pleased by services of [id number]  27 ER. ER  had stated to pt that there is a cyst located in abdomen caused by the shunt in her stomach which is coiled. The cyst is caused by the shunt rubbing against the pancreas. Pt states that the last CT scan stated the coiled shunt in stomach had caused inflammation of her liver. Pt states she is now hearing a \"crunch noise\" in her head where the shunt is placed. Pts neurosurgeon from [facility] called her this week. Asked pt to return to [facility] for a consult. Pt states she is unable to find a friend to take her to [place] and is unable to fly in an airplane due to increased cranial pressure. This writer did ask the patient if she has considered traveling by bus. Pt states she would consider this. Pt thanked this writer for his services as CHW. Pt became emotional when explaining this medical journey to find solutions regarding her brain shunt. Stated that she hates to feel judged by the multiple health systems and hospitals that she visits and is often labeled drug seeking which is very upsetting to the patient, understandably. Petrona is to see GI at [facility] this week. Due to the visit at [place] [address], it is now her belief that the shunt is malfunctioning in the stomach and cause these consequent adverse conditions. Pt states she may attempt to seek further health advice through [place] [address] which uses [facility] for consult services.  She would ideally like to go back to [facility] to have the neurosurgeon repair the shunt. At this time her confidence is lowered in UC, understandably due to the multiple specialists she must see before they allow her to make an appointment with neuroscience.      Tiny DEVINE  ED   [phone]

## 2022-08-03 ENCOUNTER — OFFICE VISIT (OUTPATIENT)
Dept: GYNECOLOGY | Age: 42
End: 2022-08-03
Payer: COMMERCIAL

## 2022-08-03 VITALS
DIASTOLIC BLOOD PRESSURE: 87 MMHG | WEIGHT: 148 LBS | SYSTOLIC BLOOD PRESSURE: 139 MMHG | HEART RATE: 90 BPM | BODY MASS INDEX: 29.89 KG/M2

## 2022-08-03 DIAGNOSIS — L90.5 SCAR TISSUE: ICD-10-CM

## 2022-08-03 DIAGNOSIS — R87.610 ASCUS WITH POSITIVE HIGH RISK HPV CERVICAL: Primary | ICD-10-CM

## 2022-08-03 DIAGNOSIS — R87.810 ASCUS WITH POSITIVE HIGH RISK HPV CERVICAL: Primary | ICD-10-CM

## 2022-08-03 PROCEDURE — 99213 OFFICE O/P EST LOW 20 MIN: CPT | Performed by: OBSTETRICS & GYNECOLOGY

## 2022-08-03 PROCEDURE — G8417 CALC BMI ABV UP PARAM F/U: HCPCS | Performed by: OBSTETRICS & GYNECOLOGY

## 2022-08-03 PROCEDURE — 4004F PT TOBACCO SCREEN RCVD TLK: CPT | Performed by: OBSTETRICS & GYNECOLOGY

## 2022-08-03 PROCEDURE — G8428 CUR MEDS NOT DOCUMENT: HCPCS | Performed by: OBSTETRICS & GYNECOLOGY

## 2022-08-03 NOTE — PROGRESS NOTES
Subjective:      Patient ID: Hunter Almeida is a 46 y.o. female. HPI  pts here for repeat pap. Having increased pain from previous hysterectomy. No bleeding or d/c. Review of Systems Pertinent review of systems items discussed above. All others systems items not discussed above were negative. Objective:   Physical Exam    Af, vss  Ext- no lesions  Meatus- no lesions  Bladder- good support  Vag- no d/c, good support  Cx- n/a    Assessment:   H/o LGSIL     Plan:   Call with results. F/u pap in 4 months. Refer to Jocelyn/Misha for possible pelvic adhesions.        Cari Baumann MD

## 2022-08-08 ENCOUNTER — TELEPHONE (OUTPATIENT)
Dept: FAMILY MEDICINE CLINIC | Age: 42
End: 2022-08-08

## 2022-08-08 DIAGNOSIS — F90.9 ATTENTION DEFICIT HYPERACTIVITY DISORDER (ADHD), UNSPECIFIED ADHD TYPE: ICD-10-CM

## 2022-08-08 RX ORDER — LISDEXAMFETAMINE DIMESYLATE 50 MG
50 CAPSULE ORAL EVERY MORNING
Qty: 30 CAPSULE | Refills: 0 | Status: SHIPPED | OUTPATIENT
Start: 2022-08-08 | End: 2022-09-08 | Stop reason: SDUPTHER

## 2022-08-08 NOTE — TELEPHONE ENCOUNTER
Results are not back yet as it takes up to a week, she saw the results of her HPV and I went over that with her.

## 2022-08-15 ENCOUNTER — INITIAL CONSULT (OUTPATIENT)
Dept: SURGERY | Age: 42
End: 2022-08-15
Payer: COMMERCIAL

## 2022-08-15 VITALS
WEIGHT: 165 LBS | BODY MASS INDEX: 33.33 KG/M2 | DIASTOLIC BLOOD PRESSURE: 87 MMHG | HEART RATE: 88 BPM | SYSTOLIC BLOOD PRESSURE: 125 MMHG

## 2022-08-15 DIAGNOSIS — R10.30 LOWER ABDOMINAL PAIN: Primary | ICD-10-CM

## 2022-08-15 PROCEDURE — 99212 OFFICE O/P EST SF 10 MIN: CPT | Performed by: SURGERY

## 2022-08-19 ENCOUNTER — HOSPITAL ENCOUNTER (EMERGENCY)
Age: 42
Discharge: HOME | End: 2022-08-19
Attending: EMERGENCY MEDICINE
Payer: COMMERCIAL

## 2022-08-19 ENCOUNTER — APPOINTMENT (OUTPATIENT)
Dept: CT IMAGING | Age: 42
End: 2022-08-19
Payer: COMMERCIAL

## 2022-08-19 VITALS
HEIGHT: 59 IN | WEIGHT: 158.95 LBS | HEART RATE: 94 BPM | TEMPERATURE: 98.4 F | SYSTOLIC BLOOD PRESSURE: 112 MMHG | OXYGEN SATURATION: 95 % | DIASTOLIC BLOOD PRESSURE: 79 MMHG | BODY MASS INDEX: 32.04 KG/M2 | RESPIRATION RATE: 16 BRPM

## 2022-08-19 DIAGNOSIS — R10.9 FLANK PAIN: ICD-10-CM

## 2022-08-19 DIAGNOSIS — R33.9 URINARY RETENTION: Primary | ICD-10-CM

## 2022-08-19 LAB
BILIRUBIN URINE: NEGATIVE
BLOOD, URINE: NEGATIVE
CLARITY: CLEAR
COLOR: YELLOW
GLUCOSE URINE: NEGATIVE MG/DL
HCG(URINE) PREGNANCY TEST: NEGATIVE
KETONES, URINE: NEGATIVE MG/DL
LEUKOCYTE ESTERASE, URINE: NEGATIVE
MICROSCOPIC EXAMINATION: NORMAL
NITRITE, URINE: NEGATIVE
PH UA: 6 (ref 5–8)
PROTEIN UA: NEGATIVE MG/DL
SPECIFIC GRAVITY UA: 1.01 (ref 1–1.03)
URINE TYPE: NORMAL
UROBILINOGEN, URINE: 0.2 E.U./DL

## 2022-08-19 PROCEDURE — 6370000000 HC RX 637 (ALT 250 FOR IP): Performed by: PHYSICIAN ASSISTANT

## 2022-08-19 PROCEDURE — 99284 EMERGENCY DEPT VISIT MOD MDM: CPT

## 2022-08-19 PROCEDURE — 81003 URINALYSIS AUTO W/O SCOPE: CPT

## 2022-08-19 PROCEDURE — 74176 CT ABD & PELVIS W/O CONTRAST: CPT

## 2022-08-19 PROCEDURE — 84703 CHORIONIC GONADOTROPIN ASSAY: CPT

## 2022-08-19 PROCEDURE — 51798 US URINE CAPACITY MEASURE: CPT

## 2022-08-19 PROCEDURE — 51702 INSERT TEMP BLADDER CATH: CPT

## 2022-08-19 RX ORDER — ONDANSETRON 4 MG/1
4 TABLET, ORALLY DISINTEGRATING ORAL ONCE
Status: COMPLETED | OUTPATIENT
Start: 2022-08-19 | End: 2022-08-19

## 2022-08-19 RX ORDER — OXYCODONE HYDROCHLORIDE AND ACETAMINOPHEN 5; 325 MG/1; MG/1
1 TABLET ORAL ONCE
Status: COMPLETED | OUTPATIENT
Start: 2022-08-19 | End: 2022-08-19

## 2022-08-19 RX ORDER — PROMETHAZINE HYDROCHLORIDE 25 MG/1
25 TABLET ORAL EVERY 6 HOURS PRN
Qty: 12 TABLET | Refills: 0 | Status: SHIPPED | OUTPATIENT
Start: 2022-08-19 | End: 2022-08-26

## 2022-08-19 RX ADMIN — ONDANSETRON 4 MG: 4 TABLET, ORALLY DISINTEGRATING ORAL at 12:31

## 2022-08-19 RX ADMIN — OXYCODONE HYDROCHLORIDE AND ACETAMINOPHEN 1 TABLET: 5; 325 TABLET ORAL at 12:31

## 2022-08-19 ASSESSMENT — ENCOUNTER SYMPTOMS
BACK PAIN: 0
SORE THROAT: 0
SHORTNESS OF BREATH: 0
ABDOMINAL PAIN: 1
NAUSEA: 0
EYE PAIN: 0
VOMITING: 0
COUGH: 0

## 2022-08-19 ASSESSMENT — LIFESTYLE VARIABLES
HOW OFTEN DO YOU HAVE A DRINK CONTAINING ALCOHOL: NEVER
HOW MANY STANDARD DRINKS CONTAINING ALCOHOL DO YOU HAVE ON A TYPICAL DAY: PATIENT DOES NOT DRINK

## 2022-08-19 ASSESSMENT — PAIN DESCRIPTION - FREQUENCY: FREQUENCY: CONTINUOUS

## 2022-08-19 ASSESSMENT — PAIN DESCRIPTION - PAIN TYPE
TYPE: ACUTE PAIN
TYPE: ACUTE PAIN

## 2022-08-19 ASSESSMENT — PAIN - FUNCTIONAL ASSESSMENT: PAIN_FUNCTIONAL_ASSESSMENT: 0-10

## 2022-08-19 ASSESSMENT — PAIN SCALES - GENERAL
PAINLEVEL_OUTOF10: 8
PAINLEVEL_OUTOF10: 10
PAINLEVEL_OUTOF10: 8

## 2022-08-19 ASSESSMENT — PAIN DESCRIPTION - LOCATION
LOCATION: FLANK
LOCATION: ABDOMEN
LOCATION: ABDOMEN

## 2022-08-19 ASSESSMENT — PAIN DESCRIPTION - ORIENTATION
ORIENTATION: RIGHT
ORIENTATION: RIGHT

## 2022-08-19 ASSESSMENT — PAIN DESCRIPTION - DESCRIPTORS
DESCRIPTORS: ACHING
DESCRIPTORS: ACHING

## 2022-08-19 NOTE — ED NOTES
Discharge education complete. Patient educated on new medications, follow up care with urology, silva care and reasons to seek medical attention. Patient denies questions or concerns, no sxs of distress noted , patient declined wheel chair at time of discharge. Lift arranged.         Genaro Garcia RN  08/19/22 [id number]

## 2022-08-19 NOTE — ED PROVIDER NOTES
I have personally performed a face to face diagnostic evaluation on this patient. I have fully participated in the care of this patient I personally saw the patient and performed a substantive portion of the visit including all aspects of the medical decision making. I have reviewed and agree with all pertinent clinical information including history, physical exam, diagnostic tests, and the plan. HPI: Elise Salas presented with right-sided flank pain that radiates to her right lower abdomen. Started 4 to 5 days ago. Similar to previous episodes that she has had. Patient has a history of multiple renal stones last one has been seen on the right side. Patient also has intermittent chronic urinary tract infections. Patient also has chronic abdominal pain. Reports some difficulty urinating over the last few days. Only peeing 1-2 times a day. No other modifying factors. See ALDAIR note for further details. Chief Complaint   Patient presents with    Flank Pain     Right sided radiates to abdomen, started 4-5 days ago, with decreased urine output         Review of Systems: See ALDAIR note  Vital Signs: BP (!) 149/87   Pulse (!) 126   Temp 98.4 °F (36.9 °C) (Oral)   Resp 18   Wt 158 lb 15.2 oz (72.1 kg)   LMP 10/31/2016 (Exact Date)   SpO2 99%   BMI 32.10 kg/m²     Alert 46 y.o. female who does not appear toxic or acutely ill  HENT: Atraumatic, oral mucosa moist  Neck: Grossly normal ROM  Chest/Lungs: respiratory effort normal   Abdomen: Soft nontender no reproducible lower quadrant tenderness  Extremities: 2+ radial bilaterally  Musculoskeletal: Grossly normal ROM  Skin: No palor or diaphoresis    Medical Decision Making and Plan:  Pertinent Labs & Imaging studies reviewed. (See ALDAIR chart for details)  I agree with ALDAIR assessment and plan. Patient with acute on chronic flank and abdominal pain. Patient has multiple CT abdomens over the last 2 months.   Patient has no peritoneal findings on exam.  Low concern for intra-abdominal infection at this time. Afebrile. Is slightly tachycardic upon arrival likely due to pain. Patient is normotensive. Will obtain CT abdomen pelvis without contrast as well as urinalysis. Will reeval closely and disposition accordingly. Update  Urine and CT scan unremarkable however patient had 400 cc in her bladder status post urination. Guzman catheter was placed with over 400 cc of urine output. Will give patient follow-up with urology for urinary retention of unclear etiology at this time. Patient remains with normal vital signs no further tachycardia afebrile saturating well on room air normotensive. The patient is at low risk for mortality based on demographic, history and clinical factors. Given the best available information and clinical assessment, I estimate the risk of hospitalization to be greater than risk of treatment at home. I have explained to the patient that the risk could rapidly change, given precautions for return and instructions. Explained to patient that the risk for mortality is low based on demographic, history and clinical factors. I discussed with patient the results of evaluation in the ED, diagnosis, care, and prognosis. The plan is to discharge to home. Patient is in agreement with plan and questions have been answered. I also discussed with patient the reasons which may require a return visit and the importance of follow-up care. The patient is well-appearing, nontoxic, and improved at the time of discharge. Patient agrees to call to arrange follow-up care as directed. Patient understands to return immediately for worsening/change in symptoms.         Tonya Gongora MD  08/19/22 3778

## 2022-08-19 NOTE — PROGRESS NOTES
Daphney Stern (:  1980) is a 46 y.o. female,Established patient, here for evaluation of the following chief complaint(s):  Abdominal Pain ( New patient. Ref by Dr. Saw Moreno for lysis of adhesions. )         ASSESSMENT/PLAN:  1. Lower abdominal pain    Await results of MRI from GI and their assessment    Follow up after testing         Subjective   SUBJECTIVE/OBJECTIVE:  HPI  Patient known from removal of abdominal wall mass presents with chronic lower abdominal pain. CT from last month reviewed and she has separation of posterior rectus muscles from umbilicus to pubis. May have a hernia in that location. Currently undergoing workup with GI so await results. Follow up after GI workup and we will discuss possible surgical intervention      Review of Systems       Objective   Physical Exam       Electronically signed by Flakita Aguilar MD on 2022 at 10:45 AM        An electronic signature was used to authenticate this note.     --Flakita Aguilar MD

## 2022-08-23 ENCOUNTER — HOSPITAL ENCOUNTER (INPATIENT)
Age: 42
LOS: 1 days | Discharge: LEFT BEFORE DISCHARGE | DRG: 463 | End: 2022-08-24
Attending: EMERGENCY MEDICINE | Admitting: INTERNAL MEDICINE
Payer: COMMERCIAL

## 2022-08-23 DIAGNOSIS — N39.0 URINARY TRACT INFECTION ASSOCIATED WITH INDWELLING URETHRAL CATHETER, INITIAL ENCOUNTER (HCC): Primary | ICD-10-CM

## 2022-08-23 DIAGNOSIS — T83.511A URINARY TRACT INFECTION ASSOCIATED WITH INDWELLING URETHRAL CATHETER, INITIAL ENCOUNTER (HCC): Primary | ICD-10-CM

## 2022-08-23 LAB
A/G RATIO: 1.3 (ref 1.1–2.2)
ALBUMIN SERPL-MCNC: 4.2 G/DL (ref 3.4–5)
ALP BLD-CCNC: 134 U/L (ref 40–129)
ALT SERPL-CCNC: 12 U/L (ref 10–40)
ANION GAP SERPL CALCULATED.3IONS-SCNC: 16 MMOL/L (ref 3–16)
AST SERPL-CCNC: 9 U/L (ref 15–37)
BACTERIA: ABNORMAL /HPF
BASOPHILS ABSOLUTE: 0.1 K/UL (ref 0–0.2)
BASOPHILS RELATIVE PERCENT: 0.7 %
BILIRUB SERPL-MCNC: 0.3 MG/DL (ref 0–1)
BILIRUBIN URINE: NEGATIVE
BLOOD, URINE: ABNORMAL
BUN BLDV-MCNC: 8 MG/DL (ref 7–20)
CALCIUM SERPL-MCNC: 9.7 MG/DL (ref 8.3–10.6)
CHLORIDE BLD-SCNC: 104 MMOL/L (ref 99–110)
CLARITY: ABNORMAL
CO2: 21 MMOL/L (ref 21–32)
COLOR: YELLOW
CREAT SERPL-MCNC: 0.7 MG/DL (ref 0.6–1.1)
EOSINOPHILS ABSOLUTE: 0.2 K/UL (ref 0–0.6)
EOSINOPHILS RELATIVE PERCENT: 1.2 %
EPITHELIAL CELLS, UA: 1 /HPF (ref 0–5)
GFR AFRICAN AMERICAN: >60
GFR NON-AFRICAN AMERICAN: >60
GLUCOSE BLD-MCNC: 114 MG/DL (ref 70–99)
GLUCOSE URINE: NEGATIVE MG/DL
HCT VFR BLD CALC: 42.7 % (ref 36–48)
HEMOGLOBIN: 14.3 G/DL (ref 12–16)
HYALINE CASTS: 1 /LPF (ref 0–8)
KETONES, URINE: NEGATIVE MG/DL
LEUKOCYTE ESTERASE, URINE: ABNORMAL
LYMPHOCYTES ABSOLUTE: 2.6 K/UL (ref 1–5.1)
LYMPHOCYTES RELATIVE PERCENT: 17.7 %
MCH RBC QN AUTO: 28 PG (ref 26–34)
MCHC RBC AUTO-ENTMCNC: 33.5 G/DL (ref 31–36)
MCV RBC AUTO: 83.5 FL (ref 80–100)
MICROSCOPIC EXAMINATION: YES
MONOCYTES ABSOLUTE: 0.7 K/UL (ref 0–1.3)
MONOCYTES RELATIVE PERCENT: 5 %
NEUTROPHILS ABSOLUTE: 11 K/UL (ref 1.7–7.7)
NEUTROPHILS RELATIVE PERCENT: 75.4 %
NITRITE, URINE: POSITIVE
PDW BLD-RTO: 15.1 % (ref 12.4–15.4)
PH UA: 5.5 (ref 5–8)
PLATELET # BLD: 304 K/UL (ref 135–450)
PMV BLD AUTO: 7.3 FL (ref 5–10.5)
POTASSIUM SERPL-SCNC: 3.9 MMOL/L (ref 3.5–5.1)
PROTEIN UA: 100 MG/DL
RBC # BLD: 5.11 M/UL (ref 4–5.2)
RBC UA: 354 /HPF (ref 0–4)
SARS-COV-2, NAAT: NOT DETECTED
SODIUM BLD-SCNC: 141 MMOL/L (ref 136–145)
SPECIFIC GRAVITY UA: 1.01 (ref 1–1.03)
TOTAL PROTEIN: 7.4 G/DL (ref 6.4–8.2)
URINE TYPE: ABNORMAL
UROBILINOGEN, URINE: 0.2 E.U./DL
WBC # BLD: 14.6 K/UL (ref 4–11)
WBC UA: 226 /HPF (ref 0–5)

## 2022-08-23 PROCEDURE — 96365 THER/PROPH/DIAG IV INF INIT: CPT

## 2022-08-23 PROCEDURE — 6360000002 HC RX W HCPCS: Performed by: NURSE PRACTITIONER

## 2022-08-23 PROCEDURE — 2580000003 HC RX 258: Performed by: INTERNAL MEDICINE

## 2022-08-23 PROCEDURE — 87635 SARS-COV-2 COVID-19 AMP PRB: CPT

## 2022-08-23 PROCEDURE — 99285 EMERGENCY DEPT VISIT HI MDM: CPT

## 2022-08-23 PROCEDURE — 80053 COMPREHEN METABOLIC PANEL: CPT

## 2022-08-23 PROCEDURE — 85025 COMPLETE CBC W/AUTO DIFF WBC: CPT

## 2022-08-23 PROCEDURE — 81001 URINALYSIS AUTO W/SCOPE: CPT

## 2022-08-23 PROCEDURE — 6360000002 HC RX W HCPCS: Performed by: EMERGENCY MEDICINE

## 2022-08-23 PROCEDURE — 2580000003 HC RX 258: Performed by: EMERGENCY MEDICINE

## 2022-08-23 PROCEDURE — 6360000002 HC RX W HCPCS: Performed by: INTERNAL MEDICINE

## 2022-08-23 PROCEDURE — 87186 SC STD MICRODIL/AGAR DIL: CPT

## 2022-08-23 PROCEDURE — 1200000000 HC SEMI PRIVATE

## 2022-08-23 PROCEDURE — 96375 TX/PRO/DX INJ NEW DRUG ADDON: CPT

## 2022-08-23 PROCEDURE — 87086 URINE CULTURE/COLONY COUNT: CPT

## 2022-08-23 PROCEDURE — 36415 COLL VENOUS BLD VENIPUNCTURE: CPT

## 2022-08-23 PROCEDURE — 87088 URINE BACTERIA CULTURE: CPT

## 2022-08-23 PROCEDURE — 6370000000 HC RX 637 (ALT 250 FOR IP): Performed by: EMERGENCY MEDICINE

## 2022-08-23 RX ORDER — SODIUM CHLORIDE 9 MG/ML
INJECTION, SOLUTION INTRAVENOUS PRN
Status: DISCONTINUED | OUTPATIENT
Start: 2022-08-23 | End: 2022-08-24 | Stop reason: HOSPADM

## 2022-08-23 RX ORDER — GABAPENTIN 400 MG/1
400 CAPSULE ORAL 3 TIMES DAILY
Status: DISCONTINUED | OUTPATIENT
Start: 2022-08-23 | End: 2022-08-24 | Stop reason: HOSPADM

## 2022-08-23 RX ORDER — ENOXAPARIN SODIUM 100 MG/ML
40 INJECTION SUBCUTANEOUS NIGHTLY
Status: DISCONTINUED | OUTPATIENT
Start: 2022-08-23 | End: 2022-08-24 | Stop reason: HOSPADM

## 2022-08-23 RX ORDER — POTASSIUM CHLORIDE 20 MEQ/1
40 TABLET, EXTENDED RELEASE ORAL PRN
Status: DISCONTINUED | OUTPATIENT
Start: 2022-08-23 | End: 2022-08-24 | Stop reason: HOSPADM

## 2022-08-23 RX ORDER — MAGNESIUM SULFATE IN WATER 40 MG/ML
2000 INJECTION, SOLUTION INTRAVENOUS PRN
Status: DISCONTINUED | OUTPATIENT
Start: 2022-08-23 | End: 2022-08-24 | Stop reason: HOSPADM

## 2022-08-23 RX ORDER — ONDANSETRON 2 MG/ML
2 INJECTION INTRAMUSCULAR; INTRAVENOUS EVERY 6 HOURS PRN
Status: DISCONTINUED | OUTPATIENT
Start: 2022-08-23 | End: 2022-08-24 | Stop reason: HOSPADM

## 2022-08-23 RX ORDER — ONDANSETRON 2 MG/ML
4 INJECTION INTRAMUSCULAR; INTRAVENOUS EVERY 6 HOURS PRN
Status: DISCONTINUED | OUTPATIENT
Start: 2022-08-23 | End: 2022-08-23 | Stop reason: ALTCHOICE

## 2022-08-23 RX ORDER — INSULIN LISPRO 100 [IU]/ML
0-8 INJECTION, SOLUTION INTRAVENOUS; SUBCUTANEOUS
Status: DISCONTINUED | OUTPATIENT
Start: 2022-08-24 | End: 2022-08-24 | Stop reason: HOSPADM

## 2022-08-23 RX ORDER — POTASSIUM CHLORIDE 7.45 MG/ML
10 INJECTION INTRAVENOUS PRN
Status: DISCONTINUED | OUTPATIENT
Start: 2022-08-23 | End: 2022-08-24 | Stop reason: HOSPADM

## 2022-08-23 RX ORDER — SODIUM CHLORIDE 0.9 % (FLUSH) 0.9 %
10 SYRINGE (ML) INJECTION PRN
Status: DISCONTINUED | OUTPATIENT
Start: 2022-08-23 | End: 2022-08-24 | Stop reason: HOSPADM

## 2022-08-23 RX ORDER — DIPHENHYDRAMINE HYDROCHLORIDE 50 MG/ML
25 INJECTION INTRAMUSCULAR; INTRAVENOUS ONCE
Status: COMPLETED | OUTPATIENT
Start: 2022-08-23 | End: 2022-08-23

## 2022-08-23 RX ORDER — OXYCODONE HYDROCHLORIDE 5 MG/1
5 TABLET ORAL EVERY 6 HOURS PRN
Status: DISCONTINUED | OUTPATIENT
Start: 2022-08-23 | End: 2022-08-24 | Stop reason: HOSPADM

## 2022-08-23 RX ORDER — 0.9 % SODIUM CHLORIDE 0.9 %
1000 INTRAVENOUS SOLUTION INTRAVENOUS ONCE
Status: COMPLETED | OUTPATIENT
Start: 2022-08-23 | End: 2022-08-23

## 2022-08-23 RX ORDER — HYDROCODONE BITARTRATE AND ACETAMINOPHEN 5; 325 MG/1; MG/1
1 TABLET ORAL EVERY 6 HOURS PRN
Status: DISCONTINUED | OUTPATIENT
Start: 2022-08-23 | End: 2022-08-24 | Stop reason: HOSPADM

## 2022-08-23 RX ORDER — PROMETHAZINE HYDROCHLORIDE 25 MG/1
25 TABLET ORAL ONCE
Status: COMPLETED | OUTPATIENT
Start: 2022-08-23 | End: 2022-08-23

## 2022-08-23 RX ORDER — ONDANSETRON 4 MG/1
4 TABLET, ORALLY DISINTEGRATING ORAL EVERY 8 HOURS PRN
Status: DISCONTINUED | OUTPATIENT
Start: 2022-08-23 | End: 2022-08-23 | Stop reason: ALTCHOICE

## 2022-08-23 RX ORDER — SODIUM CHLORIDE 0.9 % (FLUSH) 0.9 %
10 SYRINGE (ML) INJECTION EVERY 12 HOURS SCHEDULED
Status: DISCONTINUED | OUTPATIENT
Start: 2022-08-23 | End: 2022-08-24 | Stop reason: HOSPADM

## 2022-08-23 RX ORDER — INSULIN LISPRO 100 [IU]/ML
0-4 INJECTION, SOLUTION INTRAVENOUS; SUBCUTANEOUS NIGHTLY
Status: DISCONTINUED | OUTPATIENT
Start: 2022-08-23 | End: 2022-08-24 | Stop reason: HOSPADM

## 2022-08-23 RX ORDER — ONDANSETRON 2 MG/ML
4 INJECTION INTRAMUSCULAR; INTRAVENOUS ONCE
Status: COMPLETED | OUTPATIENT
Start: 2022-08-23 | End: 2022-08-23

## 2022-08-23 RX ORDER — DEXTROSE MONOHYDRATE 100 MG/ML
INJECTION, SOLUTION INTRAVENOUS CONTINUOUS PRN
Status: DISCONTINUED | OUTPATIENT
Start: 2022-08-23 | End: 2022-08-24 | Stop reason: HOSPADM

## 2022-08-23 RX ORDER — GABAPENTIN 400 MG/1
400 CAPSULE ORAL 3 TIMES DAILY
COMMUNITY

## 2022-08-23 RX ORDER — OXYCODONE HYDROCHLORIDE 5 MG/1
5 TABLET ORAL EVERY 6 HOURS PRN
Status: ON HOLD | COMMUNITY
End: 2022-09-30 | Stop reason: SDUPTHER

## 2022-08-23 RX ADMIN — HYDROMORPHONE HYDROCHLORIDE 1 MG: 1 INJECTION, SOLUTION INTRAMUSCULAR; INTRAVENOUS; SUBCUTANEOUS at 20:34

## 2022-08-23 RX ADMIN — ONDANSETRON 4 MG: 2 INJECTION INTRAMUSCULAR; INTRAVENOUS at 18:52

## 2022-08-23 RX ADMIN — Medication 10 ML: at 20:35

## 2022-08-23 RX ADMIN — DIPHENHYDRAMINE HYDROCHLORIDE 25 MG: 50 INJECTION, SOLUTION INTRAMUSCULAR; INTRAVENOUS at 16:54

## 2022-08-23 RX ADMIN — SODIUM CHLORIDE 1000 ML: 9 INJECTION, SOLUTION INTRAVENOUS at 15:56

## 2022-08-23 RX ADMIN — MEROPENEM 1000 MG: 1 INJECTION, POWDER, FOR SOLUTION INTRAVENOUS at 23:53

## 2022-08-23 RX ADMIN — HYDROMORPHONE HYDROCHLORIDE 1 MG: 1 INJECTION, SOLUTION INTRAMUSCULAR; INTRAVENOUS; SUBCUTANEOUS at 15:54

## 2022-08-23 RX ADMIN — ENOXAPARIN SODIUM 40 MG: 100 INJECTION SUBCUTANEOUS at 20:35

## 2022-08-23 RX ADMIN — PROMETHAZINE HYDROCHLORIDE 25 MG: 25 TABLET ORAL at 16:55

## 2022-08-23 RX ADMIN — CEFTRIAXONE 1000 MG: 1 INJECTION, POWDER, FOR SOLUTION INTRAMUSCULAR; INTRAVENOUS at 16:55

## 2022-08-23 RX ADMIN — ONDANSETRON 4 MG: 2 INJECTION INTRAMUSCULAR; INTRAVENOUS at 15:51

## 2022-08-23 RX ADMIN — ONDANSETRON 2 MG: 2 INJECTION INTRAMUSCULAR; INTRAVENOUS at 23:40

## 2022-08-23 ASSESSMENT — PAIN DESCRIPTION - FREQUENCY
FREQUENCY: CONTINUOUS
FREQUENCY: CONTINUOUS

## 2022-08-23 ASSESSMENT — PAIN DESCRIPTION - ORIENTATION
ORIENTATION: RIGHT;LEFT
ORIENTATION: RIGHT;LEFT

## 2022-08-23 ASSESSMENT — PAIN DESCRIPTION - PAIN TYPE
TYPE: ACUTE PAIN
TYPE: ACUTE PAIN

## 2022-08-23 ASSESSMENT — PAIN SCALES - GENERAL
PAINLEVEL_OUTOF10: 10
PAINLEVEL_OUTOF10: 9

## 2022-08-23 ASSESSMENT — PAIN DESCRIPTION - DESCRIPTORS
DESCRIPTORS: SHARP
DESCRIPTORS: BURNING;CRAMPING;SHARP
DESCRIPTORS: ACHING

## 2022-08-23 ASSESSMENT — PAIN DESCRIPTION - LOCATION
LOCATION: BACK;FLANK;PELVIS
LOCATION: FLANK;PELVIS
LOCATION: ABDOMEN;FLANK

## 2022-08-23 ASSESSMENT — PAIN DESCRIPTION - ONSET: ONSET: ON-GOING

## 2022-08-23 ASSESSMENT — ENCOUNTER SYMPTOMS
NAUSEA: 1
VOMITING: 1
ABDOMINAL PAIN: 1

## 2022-08-23 ASSESSMENT — LIFESTYLE VARIABLES: HOW OFTEN DO YOU HAVE A DRINK CONTAINING ALCOHOL: NEVER

## 2022-08-23 NOTE — PROGRESS NOTES
Medication Reconciliation    List of medications patient is currently taking is complete. Source of information: 1. Conversation with patient at bedside                                      2. EPIC records                                       3. OARRS     Allergies  Bee venom, Bentyl [dicyclomine hcl], Dicyclomine, Ketorolac tromethamine, Levofloxacin, [place], Mushroom extract complex, Oxycodone-acetaminophen, Sulfa antibiotics, Vancomycin, Adhesive tape, Dicyclomine hcl, Haldol [haloperidol], Shiitake mushroom, Sulfacetamide, Acetaminophen, Butalbital-apap-caff-cod, Ceftaroline, Daptomycin, Fosfomycin tromethamine, Ketamine, Methocarbamol, Morphine, Nitrofurantoin, Prochlorperazine, Reglan [metoclopramide], Silicone, Tazobactam, and Zosyn [piperacillin sod-tazobactam so]     Notes regarding home medications:   1. Patient did not take any of her home medications prior to arrival to the ER today.   2. Patient indicates that she will occasionally take Alprazolam for \"anxiety\" or \"when she is in the hospital\" - Per OARRS, patient last received Alprazolam 1 mg tablets, #15 for 15 day supply in 12/2021 - this was not added to her current home medication list.    Jenean Opitz, [facility], PharmD, BCPS  8/23/2022 5:11 PM

## 2022-08-23 NOTE — ED NOTES
Report called to nurse for [phone] and patient taken to room 4260 at this time     Luzmaria Owen RN  08/23/22 8418

## 2022-08-23 NOTE — ED PROVIDER NOTES
[facility]  eMERGENCY dEPARTMENT eNCOUnter        Pt Name: Robin Lebron  MRN: [id number]  [place] 1980  Date of evaluation: 8/23/2022  Provider: Renata Raymond MD  PCP: FREDRICK Ferrari - CNP      CHIEF COMPLAINT       Chief Complaint   Patient presents with    Urinary Retention     Having trouble with kidney stones had silva placed last week then urine slowed down last night and having pain in bilateral flank areas into pelvis       HISTORY OFPRESENT ILLNESS   (Location/Symptom, Timing/Onset, Context/Setting, Quality, Duration, Modifying Factors,Severity)  Note limiting factors. Robin Lebron is a 46 y.o. female   with a history of    has a past medical history of ADHD (attention deficit hyperactivity disorder), Depression with anxiety, Diabetes mellitus ([address] Difficult intubation, Encounter for imaging to screen for metal prior to MRI, ESBL (extended spectrum beta-lactamase) producing bacteria infection, Functional ovarian cysts, Headache(784.0), History of blood transfusion, History of kidney stones, History of PCOS, Hydrocephalus ([address] Hyperlipidemia, Irritable bowel syndrome, Meningitis, Neutrophilic leukocytosis, Nicotine dependence, PONV (postoperative nausea and vomiting), Primary osteoarthritis of left knee, S/P cone biopsy of cervix, Scoliosis, Seizures ([address]  (ventriculoperitoneal) shunt status,  (ventriculoperitoneal) shunt status, and Wears glasses. Who presents with lower abdominal pain nausea and vomiting and concerns about decreased urine output. This is the third visit for this patient in as many days. She was here at [facility] on the 28th and she was [facility] the next day. She returns today complaining of persistent lower abdominal pain associated with nausea and vomiting. She had a CT scan done here 3 days ago that was negative for significant acute pathology.   She is complaining that she is seeing blood clots in her urine in the Guzman catheter. She is chronically on oxycodone and states she was unable to take her pain medication and she has been throwing up. Pain is on both sides of her lower abdomen and radiates to both sides of her back. She was found to be in some urinary retention and subsequently has a Guzman catheter until she sees urology. Nursing Noteswere all reviewed and agreed with or any disagreements were addressed  in the HPI. REVIEW OF SYSTEMS    (2-9 systems for level 4, 10 or more for level 5)     Review of Systems   Constitutional:  Positive for fever (States she had a fever of 100.1 at home. ). Gastrointestinal:  Positive for abdominal pain, nausea and vomiting. Genitourinary:  Positive for difficulty urinating, hematuria and pelvic pain. PAST MEDICAL HISTORY     Past Medical History:   Diagnosis Date    ADHD (attention deficit hyperactivity disorder) 1988    Depression with anxiety     Diabetes mellitus ([address]     pre-diabetes    Difficult intubation     Encounter for imaging to screen for metal prior to MRI 06/01/2021    MRI Conditional Medtronic Non-Programmable shunt model#[id number] implanted 10/30/2020 at [facility]. Normal Mode. 1.5T or 3.0T. ESBL (extended spectrum beta-lactamase) producing bacteria infection 11/06/2019    urine    Functional ovarian cysts 2008    rt ovary cyst x 2 yrs.     Headache(784.0)     migraines    History of blood transfusion     at birth    History of kidney stones     History of PCOS     Hydrocephalus ([address]     Hyperlipidemia     Irritable bowel syndrome 2004    had colonoscopy about 6 yrs ago    Meningitis 46/7515    Neutrophilic leukocytosis     Nicotine dependence     PONV (postoperative nausea and vomiting)     very nauseated and sometimes wakes up with a Migraine--happened once after brain surgery    Primary osteoarthritis of left knee 07/01/2016    S/P cone biopsy of cervix 2004    Scoliosis 1990.s    Seizures ([address]      (ventriculoperitoneal) shunt status     hydrocephalus f/w Neurosurgeon at [facility]     (ventriculoperitoneal) shunt status     Wears glasses     reading         SURGICAL HISTORY     Past Surgical History:   Procedure Laterality Date    ABDOMEN SURGERY N/A 2021    REMOVAL OF ABDOMINAL WALL MASS performed by Chris Ambriz MD at [facility]      appendicitis     SECTION  2005    placenta previa    CHOLECYSTECTOMY      COLONOSCOPY  2004    COLPOSCOPY      CSF SHUNT      replaced at age 17    CYSTOSCOPY      stone removal    HEMORRHOID SURGERY      HERNIA REPAIR Bilateral     inguinal    HERNIA REPAIR      hiatal hernia    HYSTERECTOMY, TOTAL ABDOMINAL (CERVIX REMOVED)  2016    TAHBSO, adhesions/PCOS    KNEE ARTHROSCOPY Left 2015    medial meniscectomy, chondroplasty, plica resection    LITHOTRIPSY Bilateral 12/10/2019    OTHER SURGICAL HISTORY  10/19/2016    op lap    VENTRICULOPERITONEAL SHUNT      multiple revisions, most recent          CURRENTMEDICATIONS       Previous Medications    GABAPENTIN (NEURONTIN) 100 MG CAPSULE    PLEASE SEE ATTACHED FOR DETAILED DIRECTIONS    OXYCODONE ER PO        PROMETHAZINE (PHENERGAN) 25 MG TABLET    Take 1 tablet by mouth every 6 hours as needed for Nausea    SIMVASTATIN (ZOCOR) 10 MG TABLET    Take 1 tablet by mouth nightly    VYVANSE 50 MG CAPSULE    Take 1 capsule by mouth every morning for 30 days. Take 50 mg by mouth every morning.        ALLERGIES     Bee venom, Bentyl [dicyclomine hcl], Dicyclomine, Ketorolac tromethamine, Levofloxacin, [place], Mushroom extract complex, Oxycodone-acetaminophen, Sulfa antibiotics, Vancomycin, Adhesive tape, Dicyclomine hcl, Haldol [haloperidol], Shiitake mushroom, Sulfacetamide, Acetaminophen, Butalbital-apap-caff-cod, Ceftaroline, Daptomycin, Fosfomycin tromethamine, Ketamine, Methocarbamol, Morphine, Nitrofurantoin, Prochlorperazine, Reglan [metoclopramide], Silicone, Tazobactam, and Zosyn [piperacillin sod-tazobactam so]    FAMILY HISTORY       Family History   Problem Relation Age of Onset    High Blood Pressure Mother     Diabetes Mother     High Cholesterol Mother     Depression Mother     Diabetes Maternal Grandmother     High Blood Pressure Maternal Grandmother     High Cholesterol Maternal Grandmother     Heart Disease Maternal Grandfather     High Blood Pressure Maternal Grandfather     Heart Disease Paternal Grandmother     Stroke Paternal Grandfather     Depression Sister     Cirrhosis Father     Rheum Arthritis Neg Hx     Osteoarthritis Neg Hx     Asthma Neg Hx     Breast Cancer Neg Hx     Cancer Neg Hx     Heart Failure Neg Hx     Hypertension Neg Hx     Migraines Neg Hx     Ovarian Cancer Neg Hx     Rashes/Skin Problems Neg Hx     Seizures Neg Hx     Thyroid Disease Neg Hx           SOCIAL HISTORY       Social History     Socioeconomic History    Marital status: Single     Spouse name: None    Number of children: None    Years of education: None    Highest education level: None   Occupational History    Occupation: disability, SSI    Tobacco Use    Smoking status: Every Day     Packs/day: 0.50     Years: 18.00     Pack years: 9.00     Types: Cigarettes    Smokeless tobacco: Never   Vaping Use    Vaping Use: Never used   Substance and Sexual Activity    Alcohol use: No     Alcohol/week: 0.0 standard drinks    Drug use: Never    Sexual activity: Not Currently     Partners: Male       SCREENINGS    [place] Coma Scale  Eye Opening: Spontaneous  Best Motor Response: Obeys commands        PHYSICAL EXAM    (up to 7 for level 4, 8 or more for level 5)     ED Triage Vitals [08/23/22 1501]   BP Temp Temp Source Heart Rate Resp SpO2 Height Weight   (!) 127/92 99.2 °F (37.3 °C) Oral (!) 108 20 100 % 4' 11\" (1.499 m) 158 lb 15.2 oz (72.1 kg)      height is 4' 11\" (1.499 m) and weight is 158 lb 15.2 oz (72.1 kg). Her oral temperature is 99.2 °F (37.3 °C).  Her blood pressure is 127/92 (abnormal) and her pulse is 108 (abnormal). Her respiration is 20 and oxygen saturation is 100%. Physical Exam  Constitutional:       Appearance: She is well-developed. She is not diaphoretic. HENT:      Head: Normocephalic and atraumatic. Right Ear: External ear normal.      Left Ear: External ear normal.   Eyes:      General: No scleral icterus. Right eye: No discharge. Left eye: No discharge. Neck:      Thyroid: No thyromegaly. Vascular: No JVD. Trachea: No tracheal deviation. Cardiovascular:      Rate and Rhythm: Normal rate and regular rhythm. Heart sounds: No murmur heard. No friction rub. No gallop. Pulmonary:      Effort: Pulmonary effort is normal. No respiratory distress. Breath sounds: Normal breath sounds. No stridor. No wheezing or rales. Abdominal:      General: There is no distension. Palpations: Abdomen is soft. Tenderness: There is abdominal tenderness in the right lower quadrant, suprapubic area and left lower quadrant. There is right CVA tenderness. There is no guarding or rebound. Musculoskeletal:         General: No tenderness. Cervical back: Normal range of motion. Skin:     General: Skin is warm and dry. Findings: No rash (On exposed body surfaces). Neurological:      Mental Status: She is alert and oriented to person, place, and time. Coordination: Coordination normal.   Psychiatric:         Behavior: Behavior normal.         Thought Content:  Thought content normal.       DIAGNOSTIC RESULTS   LABS:    Results for orders placed or performed during the hospital encounter of 08/23/22   CBC with Auto Differential   Result Value Ref Range    WBC 14.6 (H) 4.0 - 11.0 K/uL    RBC 5.11 4.00 - 5.20 M/uL    Hemoglobin 14.3 12.0 - 16.0 g/dL    Hematocrit 42.7 36.0 - 48.0 %    MCV 83.5 80.0 - 100.0 fL    MCH 28.0 26.0 - 34.0 pg    MCHC 33.5 31.0 - 36.0 g/dL    RDW 15.1 12.4 - 15.4 %    Platelets 681 465 - 076 K/uL    MPV 7.3 5.0 - 10.5 fL    Neutrophils % 75.4 %    Lymphocytes % 17.7 %    Monocytes % 5.0 %    Eosinophils % 1.2 %    Basophils % 0.7 %    Neutrophils Absolute 11.0 (H) 1.7 - 7.7 K/uL    Lymphocytes Absolute 2.6 1.0 - 5.1 K/uL    Monocytes Absolute 0.7 0.0 - 1.3 K/uL    Eosinophils Absolute 0.2 0.0 - 0.6 K/uL    Basophils Absolute 0.1 0.0 - 0.2 K/uL   CMP   Result Value Ref Range    Sodium 141 136 - 145 mmol/L    Potassium 3.9 3.5 - 5.1 mmol/L    Chloride 104 99 - 110 mmol/L    CO2 21 21 - 32 mmol/L    Anion Gap 16 3 - 16    Glucose 114 (H) 70 - 99 mg/dL    BUN 8 7 - 20 mg/dL    Creatinine 0.7 0.6 - 1.1 mg/dL    GFR Non-African American >60 >60    GFR African American >60 >60    Calcium 9.7 8.3 - 10.6 mg/dL    Total Protein 7.4 6.4 - 8.2 g/dL    Albumin 4.2 3.4 - 5.0 g/dL    Albumin/Globulin Ratio 1.3 1.1 - 2.2    Total Bilirubin 0.3 0.0 - 1.0 mg/dL    Alkaline Phosphatase 134 (H) 40 - 129 U/L    ALT 12 10 - 40 U/L    AST 9 (L) 15 - 37 U/L   Urinalysis with Microscopic   Result Value Ref Range    Color, UA Yellow Straw/Yellow    Clarity, UA CLOUDY (A) Clear    Glucose, Ur Negative Negative mg/dL    Bilirubin Urine Negative Negative    Ketones, Urine Negative Negative mg/dL    Specific Gravity, UA 1.013 1.005 - 1.030    Blood, Urine LARGE (A) Negative    pH, UA 5.5 5.0 - 8.0    Protein,  (A) Negative mg/dL    Urobilinogen, Urine 0.2 <2.0 E.U./dL    Nitrite, Urine POSITIVE (A) Negative    Leukocyte Esterase, Urine LARGE (A) Negative    Microscopic Examination YES     Urine Type NotGiven     Bacteria, UA 2+ (A) None Seen /HPF    Hyaline Casts, UA 1 0 - 8 /LPF    WBC,  (H) 0 - 5 /HPF    RBC,  (H) 0 - 4 /HPF    Epithelial Cells, UA 1 0 - 5 /HPF       All other labs were within normal range or not returned as of this dictation. EKG:  All EKG's are interpreted by the Emergency Department Physician who either signs orCo-signs this chart in the absence of a cardiologist.    None    RADIOLOGY: plain film images such as CT, Ultrasound and MRI are read by the radiologist. Plain radiographic images are visualized and preliminarily interpreted by the  EDProvider with the below findings:    None        PROCEDURES   Unless otherwise noted below, none     Procedures    CRITICAL CARE TIME   N/A    CONSULTS:  None    EMERGENCY DEPARTMENT COURSE and DIFFERENTIAL DIAGNOSIS/MDM:   Vitals:    Vitals:    08/23/22 1501   BP: (!) 127/92   Pulse: (!) 108   Resp: 20   Temp: 99.2 °F (37.3 °C)   TempSrc: Oral   SpO2: 100%   Weight: 158 lb 15.2 oz (72.1 kg)   Height: 4' 11\" (1.499 m)       Patient was given the following medications:  Medications   0.9 % sodium chloride bolus (1,000 mLs IntraVENous New Bag 8/23/22 1556)   promethazine (PHENERGAN) tablet 25 mg (has no administration in time range)   cefTRIAXone (ROCEPHIN) 1,000 mg in dextrose 5 % 50 mL IVPB mini-bag (has no administration in time range)   diphenhydrAMINE (BENADRYL) injection 25 mg (has no administration in time range)   ondansetron (ZOFRAN) injection 4 mg (4 mg IntraVENous Given 8/23/22 1551)   HYDROmorphone (DILAUDID) injection 1 mg (1 mg IntraVENous Given 8/23/22 1554)       Urine today is definitely positive so due to the nature of the patient's other medical issues and with chronic pain and multiple visits over the past 3 days I requested inpatient care    Is this patient to be included in the SEP-1 Core Measure due to severe sepsis or septic shock? No   Exclusion criteria - the patient is NOT to be included for SEP-1 Core Measure due to:  May have criteria for sepsis, but does not meet criteria for severe sepsis or septic shock    FINAL IMPRESSION      1. Urinary tract infection associated with indwelling urethral catheter, initial encounter [facility])          DISPOSITION/PLAN   DISPOSITION Decision To Admit 08/23/2022 04:25:13 PM      PATIENT REFERRED TO:  No follow-up provider specified.     DISCHARGE MEDICATIONS:  New Prescriptions    No medications on file       DISCONTINUED MEDICATIONS:  Discontinued Medications    No medications on file              (Please note that portions of this note were completed with a voice recognition program.  Efforts were made to editthe dictations but occasionally words are mis-transcribed.)    Kojo Bourgeois MD (electronically signed)           Kojo Bourgeois MD  08/23/22 0258

## 2022-08-24 ENCOUNTER — TELEPHONE (OUTPATIENT)
Dept: PRIMARY CARE CLINIC | Age: 42
End: 2022-08-24

## 2022-08-24 VITALS
BODY MASS INDEX: 32.04 KG/M2 | OXYGEN SATURATION: 96 % | RESPIRATION RATE: 16 BRPM | HEART RATE: 68 BPM | WEIGHT: 158.95 LBS | SYSTOLIC BLOOD PRESSURE: 107 MMHG | TEMPERATURE: 97.9 F | HEIGHT: 59 IN | DIASTOLIC BLOOD PRESSURE: 71 MMHG

## 2022-08-24 LAB
ANION GAP SERPL CALCULATED.3IONS-SCNC: 12 MMOL/L (ref 3–16)
BASOPHILS ABSOLUTE: 0.1 K/UL (ref 0–0.2)
BASOPHILS RELATIVE PERCENT: 0.8 %
BUN BLDV-MCNC: 11 MG/DL (ref 7–20)
CALCIUM SERPL-MCNC: 8.6 MG/DL (ref 8.3–10.6)
CHLORIDE BLD-SCNC: 105 MMOL/L (ref 99–110)
CO2: 24 MMOL/L (ref 21–32)
CREAT SERPL-MCNC: 0.6 MG/DL (ref 0.6–1.1)
EOSINOPHILS ABSOLUTE: 0.3 K/UL (ref 0–0.6)
EOSINOPHILS RELATIVE PERCENT: 4 %
ESTIMATED AVERAGE GLUCOSE: 134.1 MG/DL
GFR AFRICAN AMERICAN: >60
GFR NON-AFRICAN AMERICAN: >60
GLUCOSE BLD-MCNC: 93 MG/DL (ref 70–99)
GLUCOSE BLD-MCNC: 98 MG/DL (ref 70–99)
HBA1C MFR BLD: 6.3 %
HCT VFR BLD CALC: 38.2 % (ref 36–48)
HEMOGLOBIN: 13.1 G/DL (ref 12–16)
LYMPHOCYTES ABSOLUTE: 3.1 K/UL (ref 1–5.1)
LYMPHOCYTES RELATIVE PERCENT: 35.9 %
MCH RBC QN AUTO: 28.6 PG (ref 26–34)
MCHC RBC AUTO-ENTMCNC: 34.4 G/DL (ref 31–36)
MCV RBC AUTO: 83.4 FL (ref 80–100)
MONOCYTES ABSOLUTE: 0.5 K/UL (ref 0–1.3)
MONOCYTES RELATIVE PERCENT: 5.6 %
NEUTROPHILS ABSOLUTE: 4.6 K/UL (ref 1.7–7.7)
NEUTROPHILS RELATIVE PERCENT: 53.7 %
PDW BLD-RTO: 15.5 % (ref 12.4–15.4)
PERFORMED ON: NORMAL
PLATELET # BLD: 261 K/UL (ref 135–450)
PMV BLD AUTO: 7.7 FL (ref 5–10.5)
POTASSIUM REFLEX MAGNESIUM: 3.8 MMOL/L (ref 3.5–5.1)
RBC # BLD: 4.59 M/UL (ref 4–5.2)
SODIUM BLD-SCNC: 141 MMOL/L (ref 136–145)
WBC # BLD: 8.5 K/UL (ref 4–11)

## 2022-08-24 PROCEDURE — 6360000002 HC RX W HCPCS: Performed by: INTERNAL MEDICINE

## 2022-08-24 PROCEDURE — 9990000010 HC NO CHARGE VISIT

## 2022-08-24 PROCEDURE — 2580000003 HC RX 258: Performed by: INTERNAL MEDICINE

## 2022-08-24 PROCEDURE — 6360000002 HC RX W HCPCS: Performed by: STUDENT IN AN ORGANIZED HEALTH CARE EDUCATION/TRAINING PROGRAM

## 2022-08-24 PROCEDURE — 83036 HEMOGLOBIN GLYCOSYLATED A1C: CPT

## 2022-08-24 PROCEDURE — 36415 COLL VENOUS BLD VENIPUNCTURE: CPT

## 2022-08-24 PROCEDURE — 6360000002 HC RX W HCPCS: Performed by: NURSE PRACTITIONER

## 2022-08-24 PROCEDURE — 80048 BASIC METABOLIC PNL TOTAL CA: CPT

## 2022-08-24 PROCEDURE — 85025 COMPLETE CBC W/AUTO DIFF WBC: CPT

## 2022-08-24 RX ORDER — NALOXONE HYDROCHLORIDE 0.4 MG/ML
0.4 INJECTION, SOLUTION INTRAMUSCULAR; INTRAVENOUS; SUBCUTANEOUS PRN
Status: DISCONTINUED | OUTPATIENT
Start: 2022-08-24 | End: 2022-08-24 | Stop reason: HOSPADM

## 2022-08-24 RX ADMIN — Medication 6.25 MG: at 04:17

## 2022-08-24 RX ADMIN — MEROPENEM 1000 MG: 1 INJECTION, POWDER, FOR SOLUTION INTRAVENOUS at 06:21

## 2022-08-24 RX ADMIN — HYDROMORPHONE HYDROCHLORIDE 1 MG: 1 INJECTION, SOLUTION INTRAMUSCULAR; INTRAVENOUS; SUBCUTANEOUS at 00:22

## 2022-08-24 RX ADMIN — HYDROMORPHONE HYDROCHLORIDE 1 MG: 1 INJECTION, SOLUTION INTRAMUSCULAR; INTRAVENOUS; SUBCUTANEOUS at 04:20

## 2022-08-24 ASSESSMENT — PAIN DESCRIPTION - LOCATION
LOCATION: ABDOMEN;FLANK

## 2022-08-24 ASSESSMENT — PAIN DESCRIPTION - ORIENTATION
ORIENTATION: RIGHT;LEFT

## 2022-08-24 ASSESSMENT — PAIN SCALES - GENERAL
PAINLEVEL_OUTOF10: 8
PAINLEVEL_OUTOF10: 8
PAINLEVEL_OUTOF10: 9

## 2022-08-24 ASSESSMENT — PAIN DESCRIPTION - DESCRIPTORS
DESCRIPTORS: ACHING

## 2022-08-24 ASSESSMENT — PAIN DESCRIPTION - PAIN TYPE: TYPE: CHRONIC PAIN

## 2022-08-24 ASSESSMENT — PAIN - FUNCTIONAL ASSESSMENT: PAIN_FUNCTIONAL_ASSESSMENT: ACTIVITIES ARE NOT PREVENTED

## 2022-08-24 NOTE — TELEPHONE ENCOUNTER
Pt called and wanted to explain her hospital stay and why she left ama. She stated that she was admitted yesterday for kidney infection and uti. Stated that she was started on medication and then the lady from the urology group came in and told her nothing was wrong with her and that she did not need these medications and dx all of her medication and then she decided to leave AMA. Advised pt that if she is still having issues and in pain to [place]rologist [place] [id number] [facility] to discuss this issue with them of to go back to a hospital. Pt gave verbal understanding.  Hs

## 2022-08-24 NOTE — CONSULTS
Consulting Physician: Ariane Medina RN    Reason for Consult: decreased uop, hx kidney stones with silva placement    History of Present Illness: Rey Up is a 46 y.o. female with chronic hydrocephalus with  shunt, OA, IBS, nephrolithiasis who is hospitalized for urinary retention and flank pain. She says last week she didn't void for over 24hrs and went to [facility], THE ED where it appears the silva catheter was placed for 400cc. The next day she went to [facility] ED for pain. She says yesterday she developed a fever and vomiting, worsening pain bilaterally going into her sides and abdomen. CT scan with nothing acute. Stable right non-obstructing stone. Creatinine normal at 0.6. Her urine culture was negative from 8/20/22- now nitrate positive, culture pending. I started asking her some questions about fluid intake, etc. To see if dehydration may have contributed to lack of output and she said \"I don't know how much I drink, I don't know why you are here. I have a urologist. Get out of my room. \" I do see she has a voiding trial already scheduled as outpatient this Friday at [facility] on [facility]. Past Medical History:   Past Medical History:   Diagnosis Date    ADHD (attention deficit hyperactivity disorder) 1988    Depression with anxiety     Diabetes mellitus ([address]     pre-diabetes    Difficult intubation     Encounter for imaging to screen for metal prior to MRI 06/01/2021    MRI Conditional Medtronic Non-Programmable shunt model#[id number] implanted 10/30/2020 at [facility]. Normal Mode. 1.5T or 3.0T. ESBL (extended spectrum beta-lactamase) producing bacteria infection 11/06/2019    urine    Functional ovarian cysts 2008    rt ovary cyst x 2 yrs.     Headache(784.0)     migraines    History of blood transfusion     at birth    History of kidney stones     History of PCOS     Hydrocephalus ([address]     Hyperlipidemia     Irritable bowel syndrome 2004    had colonoscopy about 6 yrs ago Meningitis 3107    Neutrophilic leukocytosis     Nicotine dependence     PONV (postoperative nausea and vomiting)     very nauseated and sometimes wakes up with a Migraine--happened once after brain surgery    Primary osteoarthritis of left knee 2016    S/P cone biopsy of cervix 2004    Scoliosis .s    Seizures ([address]      (ventriculoperitoneal) shunt status     hydrocephalus f/w Neurosurgeon at [facility]     (ventriculoperitoneal) shunt status     Wears glasses     reading       Past Surgical History:  Past Surgical History:   Procedure Laterality Date    ABDOMEN SURGERY N/A 2021    REMOVAL OF ABDOMINAL WALL MASS performed by Kylee Limon MD at [facility]      appendicitis     SECTION      placenta previa    CHOLECYSTECTOMY      COLONOSCOPY  2004    COLPOSCOPY      CSF SHUNT      replaced at age 17    CYSTOSCOPY      stone removal    [address] Bilateral     inguinal    HERNIA REPAIR      hiatal hernia    HYSTERECTOMY, TOTAL ABDOMINAL (CERVIX REMOVED)  2016    TAHBSO, adhesions/PCOS    KNEE ARTHROSCOPY Left 2015    medial meniscectomy, chondroplasty, plica resection    LITHOTRIPSY Bilateral 12/10/2019    OTHER SURGICAL HISTORY  10/19/2016    op lap    VENTRICULOPERITONEAL SHUNT      multiple revisions, most recent        Social History:  Social History     Socioeconomic History    Marital status: Single     Spouse name: Not on file    Number of children: Not on file    Years of education: Not on file    Highest education level: Not on file   Occupational History    Occupation: disability, SSI    Tobacco Use    Smoking status: Every Day     Packs/day: 0.50     Years: 18.00     Pack years: 9.00     Types: Cigarettes    Smokeless tobacco: Never   Vaping Use    Vaping Use: Never used   Substance and Sexual Activity    Alcohol use: No     Alcohol/week: 0.0 standard drinks    Drug use: Never    Sexual activity: Not Currently     Partners: Male   Other Topics Concern    Not on file   Social History Narrative    Not on file     Social Determinants of Health     Financial Resource Strain: Not on file   Food Insecurity: Not on file   Transportation Needs: Not on file   Physical Activity: Not on file   Stress: Not on file   Social Connections: Not on file   Intimate Partner Violence: Not on file   Housing Stability: Not on file       Family History:  Family History   Problem Relation Age of Onset    High Blood Pressure Mother     Diabetes Mother     High Cholesterol Mother     Depression Mother     Diabetes Maternal Grandmother     High Blood Pressure Maternal Grandmother     High Cholesterol Maternal Grandmother     Heart Disease Maternal Grandfather     High Blood Pressure Maternal Grandfather     Heart Disease Paternal Grandmother     Stroke Paternal Grandfather     Depression Sister     Cirrhosis Father     Rheum Arthritis Neg Hx     Osteoarthritis Neg Hx     Asthma Neg Hx     Breast Cancer Neg Hx     Cancer Neg Hx     Heart Failure Neg Hx     Hypertension Neg Hx     Migraines Neg Hx     Ovarian Cancer Neg Hx     Rashes/Skin Problems Neg Hx     Seizures Neg Hx     Thyroid Disease Neg Hx        Meds:   Current Facility-Administered Medications: HYDROmorphone (DILAUDID) injection 1 mg, 1 mg, IntraVENous, Q4H PRN  naloxone (NARCAN) injection 0.4 mg, 0.4 mg, IntraVENous, PRN  sodium chloride flush 0.9 % injection 10 mL, 10 mL, IntraVENous, 2 times per day  sodium chloride flush 0.9 % injection 10 mL, 10 mL, IntraVENous, PRN  0.9 % sodium chloride infusion, , IntraVENous, PRN  potassium chloride (KLOR-CON M) extended release tablet 40 mEq, 40 mEq, Oral, PRN **OR** potassium bicarb-citric acid (EFFER-K) effervescent tablet 40 mEq, 40 mEq, Oral, PRN **OR** potassium chloride 10 mEq/100 mL IVPB (Peripheral Line), 10 mEq, IntraVENous, PRN  potassium chloride 10 mEq/100 mL IVPB (Peripheral Line), 10 mEq, IntraVENous, PRN  magnesium sulfate 2000 mg in 50 mL IVPB premix, 2,000 mg, IntraVENous, PRN  enoxaparin (LOVENOX) injection 40 mg, 40 mg, SubCUTAneous, Nightly  magnesium hydroxide (MILK OF MAGNESIA) 400 MG/5ML suspension 30 mL, 30 mL, Oral, Daily PRN  ondansetron (ZOFRAN) injection 2 mg, 2 mg, IntraVENous, Q6H PRN  HYDROcodone-acetaminophen (NORCO) 5-325 MG per tablet 1 tablet, 1 tablet, Oral, Q6H PRN  meropenem (MERREM) 1,000 mg in sodium chloride 0.9 % 100 mL IVPB (mini-bag), 1,000 mg, IntraVENous, Q8H  glucose chewable tablet 16 g, 4 tablet, Oral, PRN  dextrose bolus 10% 125 mL, 125 mL, IntraVENous, PRN **OR** dextrose bolus 10% 250 mL, 250 mL, IntraVENous, PRN  glucagon (rDNA) injection 1 mg, 1 mg, SubCUTAneous, PRN  dextrose 10 % infusion, , IntraVENous, Continuous PRN  insulin lispro (HUMALOG) injection vial 0-8 Units, 0-8 Units, SubCUTAneous, TID WC  insulin lispro (HUMALOG) injection vial 0-4 Units, 0-4 Units, SubCUTAneous, Nightly  gabapentin (NEURONTIN) capsule 400 mg, 400 mg, Oral, TID  oxyCODONE (ROXICODONE) immediate release tablet 5 mg, 5 mg, Oral, Q6H PRN    Vitals:  /71   Pulse 68   Temp 97.9 °F (36.6 °C) (Oral)   Resp 16   Ht 4' 11\" (1.499 m)   Wt 158 lb 15.2 oz (72.1 kg)   LMP 10/31/2016 (Exact Date)   SpO2 96%   BMI 32.10 kg/m²     Intake/Output Summary (Last 24 hours) at 8/24/2022 0936  Last data filed at 8/24/2022 0617  Gross per 24 hour   Intake 240 ml   Output 250 ml   Net -10 ml       Review of Systems:  10 Systems were reviewed and negative except as in HPI      Physical Exam: Patient would not allow me to assess.      Labs:  CBC   Lab Results   Component Value Date/Time    WBC 8.5 08/24/2022 06:22 AM    RBC 4.59 08/24/2022 06:22 AM    HGB 13.1 08/24/2022 06:22 AM    HCT 38.2 08/24/2022 06:22 AM    MCV 83.4 08/24/2022 06:22 AM    MCH 28.6 08/24/2022 06:22 AM    MCHC 34.4 08/24/2022 06:22 AM    RDW 15.5 08/24/2022 06:22 AM     08/24/2022 06:22 AM    MPV 7.7 08/24/2022 06:22 AM     BMP   Lab Results Component Value Date/Time     08/24/2022 06:20 AM    K 3.8 08/24/2022 06:20 AM     08/24/2022 06:20 AM    CO2 24 08/24/2022 06:20 AM    BUN 11 08/24/2022 06:20 AM    CREATININE 0.6 08/24/2022 06:20 AM    GLUCOSE 93 08/24/2022 06:20 AM    CALCIUM 8.6 08/24/2022 06:20 AM       Urinalysis:   Lab Results   Component Value Date/Time    COLORU Yellow 08/23/2022 03:09 PM    GLUCOSEU Negative 08/23/2022 03:09 PM    GLUCOSEU NEGATIVE 10/08/2011 10:10 PM    BLOODU LARGE 08/23/2022 03:09 PM    NITRU POSITIVE 08/23/2022 03:09 PM    LEUKOCYTESUR LARGE 08/23/2022 03:09 PM       Imaging: Pertinent images and radiologist's report were reviewed independently  CT abdomen/pelvis 8/19/22  Impression   1. No evidence of obstructive uropathy. 2. Single punctate nonobstructing calculus in the right kidney noted   incidentally. 3. No acute finding otherwise noted in the abdomen or pelvis. Impression/Plan:   - 38y.o. female with urinary retention, possible UTI.  - Follow up outpatient with [facility] at already scheduled appointment this Friday.      FREDRICK العلي - CNP 6/95/75022:82 AM

## 2022-08-24 NOTE — H&P
Hospital Medicine History & Physical      PCP: FREDRICK Ring - CNP    Date of Admission: 8/23/2022    Chief Complaint:  N/V    History Of Present Illness:      Patient is a 68-year-old female with past medical history of ADHD diabetes mellitus, ESBL who presents to the hospital for nausea vomiting. According to patient she has been having generalized abdominal pain which moved towards her lower abdomen, 7/10 intensity, not radiating improved with pain medication. Patient mentions she also has noticed decreased urine output in her urine bags, she is concerned she may have a obstruction, she has Guzman catheter at home due to history of passing kidney stones. Past Medical History:          Diagnosis Date    ADHD (attention deficit hyperactivity disorder) 1988    Depression with anxiety     Diabetes mellitus ([address]     pre-diabetes    Difficult intubation     Encounter for imaging to screen for metal prior to MRI 06/01/2021    MRI Conditional Medtronic Non-Programmable shunt model#[id number] implanted 10/30/2020 at [facility]. Normal Mode. 1.5T or 3.0T. ESBL (extended spectrum beta-lactamase) producing bacteria infection 11/06/2019    urine    Functional ovarian cysts 2008    rt ovary cyst x 2 yrs.     Headache(784.0)     migraines    History of blood transfusion     at birth    History of kidney stones     History of PCOS     Hydrocephalus ([address]     Hyperlipidemia     Irritable bowel syndrome 2004    had colonoscopy about 6 yrs ago    Meningitis 66/6384    Neutrophilic leukocytosis     Nicotine dependence     PONV (postoperative nausea and vomiting)     very nauseated and sometimes wakes up with a Migraine--happened once after brain surgery    Primary osteoarthritis of left knee 07/01/2016    S/P cone biopsy of cervix 2004    Scoliosis 1990.s    Seizures ([address]      (ventriculoperitoneal) shunt status 1982    hydrocephalus f/w Neurosurgeon at [facility]     (ventriculoperitoneal) shunt status Nitrofurantoin, Prochlorperazine, Reglan [metoclopramide], Silicone, Tazobactam, and Zosyn [piperacillin sod-tazobactam so]    Social History:      TOBACCO:   reports that she has been smoking cigarettes. She has a 9.00 pack-year smoking history. She has never used smokeless tobacco.  ETOH:   reports no history of alcohol use. Family History:       Reviewed in detail and non contributory          Problem Relation Age of Onset    High Blood Pressure Mother     Diabetes Mother     High Cholesterol Mother     Depression Mother     Diabetes Maternal Grandmother     High Blood Pressure Maternal Grandmother     High Cholesterol Maternal Grandmother     Heart Disease Maternal Grandfather     High Blood Pressure Maternal Grandfather     Heart Disease Paternal Grandmother     Stroke Paternal Grandfather     Depression Sister     Cirrhosis Father     Rheum Arthritis Neg Hx     Osteoarthritis Neg Hx     Asthma Neg Hx     Breast Cancer Neg Hx     Cancer Neg Hx     Heart Failure Neg Hx     Hypertension Neg Hx     Migraines Neg Hx     Ovarian Cancer Neg Hx     Rashes/Skin Problems Neg Hx     Seizures Neg Hx     Thyroid Disease Neg Hx        REVIEW OF SYSTEMS:   Pertinent positives as noted in the HPI. All other systems reviewed and negative. PHYSICAL EXAM PERFORMED:    /80   Pulse (!) 110   Temp 98.9 °F (37.2 °C) (Oral)   Resp 18   Ht 4' 11\" (1.499 m)   Wt 158 lb 15.2 oz (72.1 kg)   LMP 10/31/2016 (Exact Date)   SpO2 100%   BMI 32.10 kg/m²     General appearance:  No apparent distress, cooperative. HEENT:  Normal cephalic, atraumatic without obvious deformity. Conjunctivae/corneas clear. Neck: Supple, with full range of motion. No cervical lymphadenopathy  Respiratory:  Normal respiratory effort. Clear to auscultation, bilaterally without Rales/Wheezes/Rhonchi. Cardiovascular:  Regular rate and rhythm with normal S1/S2 without murmurs, rubs or gallops.   Abdomen: Soft, +ve tender, non-distended, normal bowel sounds. Musculoskeletal:  No edema noted bilaterally. No tenderness on palpation   Skin: no rash visible  Neurologic:  Neurologically intact without any focal sensory/motor deficits. grossly non-focal.  Psychiatric:  Alert and oriented, normal mood  Peripheral Pulses: +2 palpable, equal bilaterally     Labs:     Recent Labs     08/23/22  1529   WBC 14.6*   HGB 14.3   HCT 42.7        Recent Labs     08/23/22  1529      K 3.9      CO2 21   BUN 8   CREATININE 0.7   CALCIUM 9.7     Recent Labs     08/23/22  1529   AST 9*   ALT 12   BILITOT 0.3   ALKPHOS 134*     No results for input(s): INR in the last 72 hours. No results for input(s): Curlie Lat in the last 72 hours. Urinalysis:      Lab Results   Component Value Date/Time    NITRU POSITIVE 08/23/2022 03:09 PM    WBCUA 226 08/23/2022 03:09 PM    BACTERIA 2+ 08/23/2022 03:09 PM    RBCUA 354 08/23/2022 03:09 PM    BLOODU LARGE 08/23/2022 03:09 PM    SPECGRAV 1.013 08/23/2022 03:09 PM    GLUCOSEU Negative 08/23/2022 03:09 PM    GLUCOSEU NEGATIVE 10/08/2011 10:10 PM       Radiology:       No orders to display           Active Hospital Problems    Diagnosis Date Noted    UTI (urinary tract infection) [N39.0] 08/23/2022     Priority: Medium         Patient is a 42-year-old female with past medical history of ADHD diabetes mellitus, ESBL who presents to the hospital for nausea vomiting. According to patient she has been having generalized abdominal pain which moved towards her lower abdomen, 7/10 intensity, not radiating improved with pain medication. Patient mentions she also has noticed decreased urine output in her urine bags, she is concerned she may have a obstruction, she has Guzman catheter at home due to history of passing kidney stones.     Assessment  Urinary tract infection  History of ESBL bacteremia  Diabetes mellitus  ADHD      Plan  Start IV meropenem  IV fluids  Consult urology  Follow-up on blood culture, urine culture  Insulin sliding scale  Resume home medications  DVT prophylaxis-Lovenox  Diet: ADULT DIET; Regular  Code Status: Full Code    PT/OT Eval Status: ordered    Dispo - pending clinical improvement       Tanya Chavira MD    The note was completed using EMR and Dragon dictation system. Every effort was made to ensure accuracy; however, inadvertent computerized transcription errors may be present. Thank you FREDRICK Holly CNP for the opportunity to be involved in this patient's care. If you have any questions or concerns please feel free to contact me at [phone].     Tanya Chavira MD

## 2022-08-24 NOTE — PROGRESS NOTES
Occupational Therapy  Petrona Hidalgo     OT orders noted. Per RN, pt is functioning independently in the room and has no therapy needs. Will sign off.     Electronically signed by Dillon Farias OT on 8/24/22 at 8:57 AM EDT

## 2022-08-24 NOTE — PLAN OF CARE
Problem: Discharge Planning  Goal: Discharge to home or other facility with appropriate resources  Outcome: Progressing  Flowsheets (Taken 8/23/2022 1828 by Gila Syed, RN)  Discharge to home or other facility with appropriate resources: Identify barriers to discharge with patient and caregiver     Problem: Pain  Goal: Verbalizes/displays adequate comfort level or baseline comfort level  Outcome: Progressing     Problem: Safety - Adult  Goal: Free from fall injury  Outcome: Progressing     Problem: ABCDS Injury Assessment  Goal: Absence of physical injury  Outcome: Progressing     Problem: Genitourinary - Adult  Goal: Absence of urinary retention  Outcome: Progressing  Goal: Urinary catheter remains patent  Outcome: Progressing

## 2022-08-24 NOTE — PROGRESS NOTES
Pt requesting pain medications. Offered the two ordered oral narcotics. Pt refused those two medications stating she does not want anything oral. She states she is throwing up but this nurse has not seen any emesis. Pt offered Zofran prn that is ordered. Pt again refused. States Zofran doesn't work and requesting phenergan. Medication is not ordered. Offered to message physician and was told to not do that. Pt now requesting xanax but medication is not ordered. Offered again to message physician but once again pt refused that option too. Pt wanting to sign out AMA but then states she won't because she doesn't have ride. Requesting that case management set up transportation. Educated pt that once signed out AMA case management can not set up ride. Told pt she could order an uber herself, pt screamed at nurse that she has no money. Pt becoming very agitated. Nurse left room. Waiting for pt to settle down before listing pt options again. Will talk with case management about potential bus ticket.

## 2022-08-24 NOTE — PLAN OF CARE
Problem: Discharge Planning  Goal: Discharge to home or other facility with appropriate resources  8/24/2022 0245 by Marge Anderson RN  Outcome: Progressing  Flowsheets (Taken 8/23/2022 1828 by Eric Gonzalez RN)  Discharge to home or other facility with appropriate resources: Identify barriers to discharge with patient and caregiver     Problem: Pain  Goal: Verbalizes/displays adequate comfort level or baseline comfort level  8/24/2022 1020 by Guido Parson RN  Outcome: Progressing  8/24/2022 0245 by Marge Anderson RN  Outcome: Progressing     Problem: Safety - Adult  Goal: Free from fall injury  8/24/2022 1020 by Guido Parson RN  Outcome: Progressing  8/24/2022 0245 by Marge Anderson RN  Outcome: Progressing     Problem: ABCDS Injury Assessment  Goal: Absence of physical injury  8/24/2022 1020 by Guido Parson RN  Outcome: Progressing  8/24/2022 0245 by Marge Anderson RN  Outcome: Progressing     Problem: Genitourinary - Adult  Goal: Absence of urinary retention  8/24/2022 0245 by Marge Anderson RN  Outcome: Progressing  Goal: Urinary catheter remains patent  8/24/2022 0245 by Marge Anderson RN  Outcome: Progressing

## 2022-08-24 NOTE — PROGRESS NOTES
Physical Therapy  Petrona Padilla     PT orders noted. Per RN, pt is functioning independently in the room and has no therapy needs. Will sign off.     Electronically signed by Pascual Fragoso PT on 8/24/2022 at 9:00 AM

## 2022-08-24 NOTE — CARE COORDINATION
Verified address, lives with mom in a trailer with 5 EVETTE    [facility]ses [facility] in [place], [facility]       08/24/22 1043   Service Assessment   Patient Orientation Alert and Oriented;Person;Place;Situation;Self   History Provided By Patient   Primary [facility] [facility] [address] Parent   PCP Verified by CM Yes   Prior Functional Level Independent in ADLs/IADLs   Current Functional Level Independent in ADLs/IADLs   Can patient return to prior living arrangement Yes   Ability to make needs known: Good   Family able to assist with home care needs: Yes   Financial Resources Medicaid   Social/Functional History   Lives With Parent   Type of Home Trailer   Home Access Stairs to enter with rails   Entrance Stairs - Number of Steps 5 EVETTE   Receives Help From Family   ADL Assistance Independent   Homemaking Assistance Independent   Ambulation Assistance Independent   Transfer Assistance Independent   Active  No   Patient's  Info mom   Discharge Planning   Type of Residence Trailer/Mobile [address] Prior To Admission None   Potential Assistance Needed N/A   DME Ordered?  No   Potential Assistance Purchasing Medications No   Type of Home Care Services None   Patient expects to be discharged to: Trailer/mobile home   [address] Discharge   Services [facility] Discharge None     Jim Darnell RN, BSN,   [phone]  Electronically signed by Jim Darnell RN on 8/24/2022 at 10:47 AM

## 2022-08-25 ENCOUNTER — CARE COORDINATION (OUTPATIENT)
Dept: CASE MANAGEMENT | Age: 42
End: 2022-08-25

## 2022-08-25 LAB
ORGANISM: ABNORMAL
URINE CULTURE, ROUTINE: ABNORMAL

## 2022-08-25 NOTE — CARE COORDINATION
[facility] Transitions Initial Follow Up Call    Call within 2 business days of discharge: Yes    Patient: Yosef Saucedo Patient : 1980   MRN: [id number]  Reason for Admission: UTI with indwelling cath  Discharge Date: 22 RARS: Readmission Risk Score: 9.5      Last Discharge [facility]       Date Complaint Diagnosis Description Type Department Provider    22 Urinary Retention Urinary tract infection associated with indwelling urethral catheter, initial encounter [facility]) ED to Hosp-Admission (Discharged) (ADMITTED) Brenita Burkitt, MD; Birdie Vale. .. Transitions of Care Initial Call    Was this an external facility discharge? No Discharge Facility: [facility] - patient left AMA    Challenges to be reviewed by the provider   Additional needs identified to be addressed with provider: No               Method of communication with provider : none    Care Transition Nurse contacted the patient by telephone to perform post hospital discharge assessment. Verified name and  with patient as identifiers. Provided introduction to self, and explanation of the CTN role. CTN provided contact information. Plan for next call:  silva cath - antibiotics - fever       Care Transitions 24 Hour Call    Care Transitions Interventions       Initial attempt at CT discharge phone call. Petrona states she is doing \"a little bit better\". She states she just arrived home @ 10 mins ago from the [facility] ED. She states she contacted EMS d/t pain and fever. Petrona states the ED physician at [facility] informed her that she needs antibiotics and has a hernia as well. Petrona was scheduled with [facility] urology for removal of her cath and a voiding trial tomorrow. She is going to contact them to see if that should be cancelled. Petrona states her PCP is currently Manolo Clifford but she feels she will be moving to a [facility] PCP. Petrona states that at this time she has no pain - just a \"dull ache\".  She states the silva remains in place. Petrona states she is nauseated and had 1 emesis at the [place] ED waiting room. She states she has chronic pain from multiple brain surgeries. Petrona states her temp is currently 100.3. She denies any needs at this time.  She just wants to rest.    Follow Up  Future Appointments   Date Time Provider Claudia Mac   8/29/2022  2:30 PM Chryl Habermann, MD [id number] TAN Xiong RN BSN  Care Transition Nurse  [phone]

## 2022-08-29 ENCOUNTER — HOSPITAL ENCOUNTER (INPATIENT)
Age: 42
LOS: 3 days | Discharge: HOME | DRG: 463 | End: 2022-09-02
Attending: HOSPITALIST | Admitting: HOSPITALIST
Payer: COMMERCIAL

## 2022-08-29 ENCOUNTER — OFFICE VISIT (OUTPATIENT)
Dept: SURGERY | Age: 42
End: 2022-08-29
Payer: COMMERCIAL

## 2022-08-29 DIAGNOSIS — N12 PYELONEPHRITIS: Primary | ICD-10-CM

## 2022-08-29 DIAGNOSIS — R10.30 LOWER ABDOMINAL PAIN: Primary | ICD-10-CM

## 2022-08-29 DIAGNOSIS — R11.2 INTRACTABLE VOMITING WITH NAUSEA, UNSPECIFIED VOMITING TYPE: ICD-10-CM

## 2022-08-29 LAB
A/G RATIO: 1.6 (ref 1.1–2.2)
ALBUMIN SERPL-MCNC: 4.4 G/DL (ref 3.4–5)
ALP BLD-CCNC: 111 U/L (ref 40–129)
ALT SERPL-CCNC: 12 U/L (ref 10–40)
ANION GAP SERPL CALCULATED.3IONS-SCNC: 15 MMOL/L (ref 3–16)
AST SERPL-CCNC: 11 U/L (ref 15–37)
BACTERIA: ABNORMAL /HPF
BASOPHILS ABSOLUTE: 0.1 K/UL (ref 0–0.2)
BASOPHILS RELATIVE PERCENT: 1.1 %
BILIRUB SERPL-MCNC: 0.3 MG/DL (ref 0–1)
BILIRUBIN URINE: NEGATIVE
BLOOD, URINE: NEGATIVE
BUN BLDV-MCNC: 15 MG/DL (ref 7–20)
CALCIUM SERPL-MCNC: 9.8 MG/DL (ref 8.3–10.6)
CHLORIDE BLD-SCNC: 103 MMOL/L (ref 99–110)
CLARITY: ABNORMAL
CO2: 22 MMOL/L (ref 21–32)
COLOR: YELLOW
CREAT SERPL-MCNC: 0.6 MG/DL (ref 0.6–1.1)
EOSINOPHILS ABSOLUTE: 0.2 K/UL (ref 0–0.6)
EOSINOPHILS RELATIVE PERCENT: 1.7 %
EPITHELIAL CELLS, UA: 15 /HPF (ref 0–5)
GFR AFRICAN AMERICAN: >60
GFR NON-AFRICAN AMERICAN: >60
GLUCOSE BLD-MCNC: 103 MG/DL (ref 70–99)
GLUCOSE URINE: NEGATIVE MG/DL
HCT VFR BLD CALC: 42 % (ref 36–48)
HEMOGLOBIN: 14.3 G/DL (ref 12–16)
HYALINE CASTS: 0 /LPF (ref 0–8)
KETONES, URINE: NEGATIVE MG/DL
LEUKOCYTE ESTERASE, URINE: ABNORMAL
LYMPHOCYTES ABSOLUTE: 3.4 K/UL (ref 1–5.1)
LYMPHOCYTES RELATIVE PERCENT: 30.6 %
MAGNESIUM: 2.2 MG/DL (ref 1.8–2.4)
MCH RBC QN AUTO: 27.7 PG (ref 26–34)
MCHC RBC AUTO-ENTMCNC: 33.9 G/DL (ref 31–36)
MCV RBC AUTO: 81.7 FL (ref 80–100)
MICROSCOPIC EXAMINATION: YES
MONOCYTES ABSOLUTE: 0.6 K/UL (ref 0–1.3)
MONOCYTES RELATIVE PERCENT: 5.1 %
NEUTROPHILS ABSOLUTE: 6.8 K/UL (ref 1.7–7.7)
NEUTROPHILS RELATIVE PERCENT: 61.5 %
NITRITE, URINE: POSITIVE
PDW BLD-RTO: 15.3 % (ref 12.4–15.4)
PH UA: 6 (ref 5–8)
PLATELET # BLD: 334 K/UL (ref 135–450)
PMV BLD AUTO: 7.4 FL (ref 5–10.5)
POTASSIUM REFLEX MAGNESIUM: 3.5 MMOL/L (ref 3.5–5.1)
PROTEIN UA: NEGATIVE MG/DL
RBC # BLD: 5.14 M/UL (ref 4–5.2)
RBC UA: 2 /HPF (ref 0–4)
SODIUM BLD-SCNC: 140 MMOL/L (ref 136–145)
SPECIFIC GRAVITY UA: 1.02 (ref 1–1.03)
TOTAL PROTEIN: 7.1 G/DL (ref 6.4–8.2)
URINE REFLEX TO CULTURE: YES
URINE TYPE: ABNORMAL
UROBILINOGEN, URINE: 1 E.U./DL
WBC # BLD: 11.1 K/UL (ref 4–11)
WBC UA: 14 /HPF (ref 0–5)

## 2022-08-29 PROCEDURE — 96375 TX/PRO/DX INJ NEW DRUG ADDON: CPT

## 2022-08-29 PROCEDURE — 36415 COLL VENOUS BLD VENIPUNCTURE: CPT

## 2022-08-29 PROCEDURE — 80053 COMPREHEN METABOLIC PANEL: CPT

## 2022-08-29 PROCEDURE — 99212 OFFICE O/P EST SF 10 MIN: CPT | Performed by: SURGERY

## 2022-08-29 PROCEDURE — 83735 ASSAY OF MAGNESIUM: CPT

## 2022-08-29 PROCEDURE — 81001 URINALYSIS AUTO W/SCOPE: CPT

## 2022-08-29 PROCEDURE — 85025 COMPLETE CBC W/AUTO DIFF WBC: CPT

## 2022-08-29 PROCEDURE — 87088 URINE BACTERIA CULTURE: CPT

## 2022-08-29 PROCEDURE — 96365 THER/PROPH/DIAG IV INF INIT: CPT

## 2022-08-29 PROCEDURE — 87186 SC STD MICRODIL/AGAR DIL: CPT

## 2022-08-29 PROCEDURE — 2580000003 HC RX 258: Performed by: PHYSICIAN ASSISTANT

## 2022-08-29 PROCEDURE — 96367 TX/PROPH/DG ADDL SEQ IV INF: CPT

## 2022-08-29 PROCEDURE — 6360000002 HC RX W HCPCS: Performed by: PHYSICIAN ASSISTANT

## 2022-08-29 PROCEDURE — 99285 EMERGENCY DEPT VISIT HI MDM: CPT

## 2022-08-29 PROCEDURE — 87086 URINE CULTURE/COLONY COUNT: CPT

## 2022-08-29 RX ORDER — BUTALBITAL, ACETAMINOPHEN AND CAFFEINE 50; 325; 40 MG/1; MG/1; MG/1
1 TABLET ORAL EVERY 6 HOURS PRN
COMMUNITY
End: 2022-09-12 | Stop reason: ALTCHOICE

## 2022-08-29 RX ORDER — 0.9 % SODIUM CHLORIDE 0.9 %
1000 INTRAVENOUS SOLUTION INTRAVENOUS ONCE
Status: COMPLETED | OUTPATIENT
Start: 2022-08-29 | End: 2022-08-30

## 2022-08-29 RX ORDER — ONDANSETRON 2 MG/ML
4 INJECTION INTRAMUSCULAR; INTRAVENOUS ONCE
Status: COMPLETED | OUTPATIENT
Start: 2022-08-29 | End: 2022-08-29

## 2022-08-29 RX ORDER — SIMVASTATIN 10 MG
10 TABLET ORAL DAILY
COMMUNITY
End: 2022-10-09

## 2022-08-29 RX ADMIN — ONDANSETRON 4 MG: 2 INJECTION INTRAMUSCULAR; INTRAVENOUS at 21:52

## 2022-08-29 RX ADMIN — Medication 12.5 MG: at 23:19

## 2022-08-29 RX ADMIN — SODIUM CHLORIDE 1000 ML: 9 INJECTION, SOLUTION INTRAVENOUS at 22:34

## 2022-08-29 RX ADMIN — CEFTRIAXONE 1000 MG: 1 INJECTION, POWDER, FOR SOLUTION INTRAMUSCULAR; INTRAVENOUS at 22:33

## 2022-08-29 RX ADMIN — HYDROMORPHONE HYDROCHLORIDE 0.5 MG: 1 INJECTION, SOLUTION INTRAMUSCULAR; INTRAVENOUS; SUBCUTANEOUS at 22:29

## 2022-08-29 ASSESSMENT — ENCOUNTER SYMPTOMS
NAUSEA: 1
SORE THROAT: 0
ABDOMINAL PAIN: 1
VOMITING: 1
SHORTNESS OF BREATH: 0
BACK PAIN: 0

## 2022-08-29 ASSESSMENT — PAIN DESCRIPTION - ORIENTATION: ORIENTATION: RIGHT;LEFT

## 2022-08-29 ASSESSMENT — PAIN DESCRIPTION - PAIN TYPE: TYPE: CHRONIC PAIN

## 2022-08-29 ASSESSMENT — PAIN DESCRIPTION - LOCATION: LOCATION: FLANK

## 2022-08-29 ASSESSMENT — PAIN SCALES - GENERAL: PAINLEVEL_OUTOF10: 8

## 2022-08-29 NOTE — PROGRESS NOTES
Sarah Marina (:  1980) is a 46 y.o. female,Established patient, here for evaluation of the following chief complaint(s):  Follow-up (Pt is here today to discuss possible hernia. )         ASSESSMENT/PLAN:   Diagnosis Orders   1. Lower abdominal pain          Continue treatment for UTI, continue GI workup    Follow up with me as needed           Subjective   SUBJECTIVE/OBJECTIVE:  HPI  Still with lower abdominal pain. Recent ER visit and admission for UTI. On antibiotics but not improving. Continue treatment for UTI then GI workup as planned. Consider hernia repair once all other issues are resolved. Review of Systems       Objective   Physical Exam       Electronically signed by Joyce Wilder MD on 2022 at 2:58 PM        An electronic signature was used to authenticate this note.     --Joyce Wilder MD

## 2022-08-30 PROBLEM — R11.2 INTRACTABLE NAUSEA AND VOMITING: Status: ACTIVE | Noted: 2022-08-30

## 2022-08-30 PROCEDURE — 6360000002 HC RX W HCPCS: Performed by: NURSE PRACTITIONER

## 2022-08-30 PROCEDURE — 2580000003 HC RX 258: Performed by: INTERNAL MEDICINE

## 2022-08-30 PROCEDURE — 6360000002 HC RX W HCPCS: Performed by: HOSPITALIST

## 2022-08-30 PROCEDURE — 1200000000 HC SEMI PRIVATE

## 2022-08-30 PROCEDURE — 6370000000 HC RX 637 (ALT 250 FOR IP): Performed by: INTERNAL MEDICINE

## 2022-08-30 PROCEDURE — 2580000003 HC RX 258: Performed by: HOSPITALIST

## 2022-08-30 PROCEDURE — 6360000002 HC RX W HCPCS: Performed by: INTERNAL MEDICINE

## 2022-08-30 RX ORDER — POLYETHYLENE GLYCOL 3350 17 G/17G
17 POWDER, FOR SOLUTION ORAL DAILY PRN
Status: DISCONTINUED | OUTPATIENT
Start: 2022-08-30 | End: 2022-08-30 | Stop reason: SDUPTHER

## 2022-08-30 RX ORDER — SODIUM CHLORIDE 0.9 % (FLUSH) 0.9 %
5-40 SYRINGE (ML) INJECTION PRN
Status: DISCONTINUED | OUTPATIENT
Start: 2022-08-30 | End: 2022-08-30 | Stop reason: SDUPTHER

## 2022-08-30 RX ORDER — SODIUM CHLORIDE 9 MG/ML
INJECTION, SOLUTION INTRAVENOUS PRN
Status: DISCONTINUED | OUTPATIENT
Start: 2022-08-30 | End: 2022-08-30 | Stop reason: SDUPTHER

## 2022-08-30 RX ORDER — SODIUM CHLORIDE 0.9 % (FLUSH) 0.9 %
5-40 SYRINGE (ML) INJECTION PRN
Status: DISCONTINUED | OUTPATIENT
Start: 2022-08-30 | End: 2022-09-02 | Stop reason: HOSPADM

## 2022-08-30 RX ORDER — LINEZOLID 2 MG/ML
600 INJECTION, SOLUTION INTRAVENOUS EVERY 12 HOURS
Status: DISCONTINUED | OUTPATIENT
Start: 2022-08-30 | End: 2022-08-31

## 2022-08-30 RX ORDER — SODIUM CHLORIDE 0.9 % (FLUSH) 0.9 %
5-40 SYRINGE (ML) INJECTION EVERY 12 HOURS SCHEDULED
Status: DISCONTINUED | OUTPATIENT
Start: 2022-08-30 | End: 2022-08-30 | Stop reason: SDUPTHER

## 2022-08-30 RX ORDER — SODIUM CHLORIDE 0.9 % (FLUSH) 0.9 %
5-40 SYRINGE (ML) INJECTION EVERY 12 HOURS SCHEDULED
Status: DISCONTINUED | OUTPATIENT
Start: 2022-08-30 | End: 2022-09-02 | Stop reason: HOSPADM

## 2022-08-30 RX ORDER — ACETAMINOPHEN 325 MG/1
650 TABLET ORAL EVERY 6 HOURS PRN
Status: DISCONTINUED | OUTPATIENT
Start: 2022-08-30 | End: 2022-09-02 | Stop reason: HOSPADM

## 2022-08-30 RX ORDER — ONDANSETRON 4 MG/1
4 TABLET, ORALLY DISINTEGRATING ORAL EVERY 8 HOURS PRN
Status: DISCONTINUED | OUTPATIENT
Start: 2022-08-30 | End: 2022-09-02 | Stop reason: HOSPADM

## 2022-08-30 RX ORDER — ACETAMINOPHEN 650 MG/1
650 SUPPOSITORY RECTAL EVERY 6 HOURS PRN
Status: DISCONTINUED | OUTPATIENT
Start: 2022-08-30 | End: 2022-09-02 | Stop reason: HOSPADM

## 2022-08-30 RX ORDER — DIPHENHYDRAMINE HCL 25 MG
25 TABLET ORAL EVERY 6 HOURS PRN
Status: DISCONTINUED | OUTPATIENT
Start: 2022-08-30 | End: 2022-09-02 | Stop reason: HOSPADM

## 2022-08-30 RX ORDER — SODIUM CHLORIDE 9 MG/ML
INJECTION, SOLUTION INTRAVENOUS PRN
Status: DISCONTINUED | OUTPATIENT
Start: 2022-08-30 | End: 2022-09-02 | Stop reason: HOSPADM

## 2022-08-30 RX ORDER — OXYCODONE HYDROCHLORIDE 5 MG/1
5 TABLET ORAL EVERY 6 HOURS PRN
Status: DISCONTINUED | OUTPATIENT
Start: 2022-08-30 | End: 2022-08-30

## 2022-08-30 RX ORDER — 0.9 % SODIUM CHLORIDE 0.9 %
2000 INTRAVENOUS SOLUTION INTRAVENOUS ONCE
Status: COMPLETED | OUTPATIENT
Start: 2022-08-30 | End: 2022-08-30

## 2022-08-30 RX ORDER — ATORVASTATIN CALCIUM 10 MG/1
10 TABLET, FILM COATED ORAL DAILY
Status: DISCONTINUED | OUTPATIENT
Start: 2022-08-30 | End: 2022-09-02 | Stop reason: HOSPADM

## 2022-08-30 RX ORDER — OXYCODONE HYDROCHLORIDE 5 MG/1
5 TABLET ORAL EVERY 4 HOURS PRN
Status: DISCONTINUED | OUTPATIENT
Start: 2022-08-30 | End: 2022-09-02 | Stop reason: HOSPADM

## 2022-08-30 RX ORDER — POLYETHYLENE GLYCOL 3350 17 G/17G
17 POWDER, FOR SOLUTION ORAL DAILY PRN
Status: DISCONTINUED | OUTPATIENT
Start: 2022-08-30 | End: 2022-09-02 | Stop reason: HOSPADM

## 2022-08-30 RX ORDER — ACETAMINOPHEN 325 MG/1
650 TABLET ORAL EVERY 6 HOURS PRN
Status: DISCONTINUED | OUTPATIENT
Start: 2022-08-30 | End: 2022-08-30 | Stop reason: SDUPTHER

## 2022-08-30 RX ORDER — ONDANSETRON 2 MG/ML
4 INJECTION INTRAMUSCULAR; INTRAVENOUS EVERY 6 HOURS PRN
Status: DISCONTINUED | OUTPATIENT
Start: 2022-08-30 | End: 2022-09-02 | Stop reason: HOSPADM

## 2022-08-30 RX ORDER — SODIUM CHLORIDE, SODIUM LACTATE, POTASSIUM CHLORIDE, CALCIUM CHLORIDE 600; 310; 30; 20 MG/100ML; MG/100ML; MG/100ML; MG/100ML
INJECTION, SOLUTION INTRAVENOUS CONTINUOUS
Status: DISCONTINUED | OUTPATIENT
Start: 2022-08-30 | End: 2022-09-02 | Stop reason: HOSPADM

## 2022-08-30 RX ORDER — ENOXAPARIN SODIUM 100 MG/ML
40 INJECTION SUBCUTANEOUS DAILY
Status: DISCONTINUED | OUTPATIENT
Start: 2022-08-30 | End: 2022-09-02 | Stop reason: HOSPADM

## 2022-08-30 RX ORDER — ACETAMINOPHEN 650 MG/1
650 SUPPOSITORY RECTAL EVERY 6 HOURS PRN
Status: DISCONTINUED | OUTPATIENT
Start: 2022-08-30 | End: 2022-08-30 | Stop reason: SDUPTHER

## 2022-08-30 RX ORDER — POTASSIUM CHLORIDE 20 MEQ/1
40 TABLET, EXTENDED RELEASE ORAL ONCE
Status: DISCONTINUED | OUTPATIENT
Start: 2022-08-30 | End: 2022-08-31

## 2022-08-30 RX ORDER — GABAPENTIN 400 MG/1
400 CAPSULE ORAL 3 TIMES DAILY
Status: DISCONTINUED | OUTPATIENT
Start: 2022-08-30 | End: 2022-09-02 | Stop reason: HOSPADM

## 2022-08-30 RX ADMIN — LINEZOLID 600 MG: 600 INJECTION, SOLUTION INTRAVENOUS at 04:23

## 2022-08-30 RX ADMIN — HYDROMORPHONE HYDROCHLORIDE 0.5 MG: 1 INJECTION, SOLUTION INTRAMUSCULAR; INTRAVENOUS; SUBCUTANEOUS at 03:17

## 2022-08-30 RX ADMIN — SODIUM CHLORIDE 1000 ML: 9 INJECTION, SOLUTION INTRAVENOUS at 13:20

## 2022-08-30 RX ADMIN — ENOXAPARIN SODIUM 40 MG: 100 INJECTION SUBCUTANEOUS at 09:44

## 2022-08-30 RX ADMIN — Medication 12.5 MG: at 12:23

## 2022-08-30 RX ADMIN — HYDROMORPHONE HYDROCHLORIDE 0.5 MG: 1 INJECTION, SOLUTION INTRAMUSCULAR; INTRAVENOUS; SUBCUTANEOUS at 21:40

## 2022-08-30 RX ADMIN — Medication 12.5 MG: at 05:53

## 2022-08-30 RX ADMIN — LINEZOLID 600 MG: 600 INJECTION, SOLUTION INTRAVENOUS at 17:10

## 2022-08-30 RX ADMIN — SODIUM CHLORIDE: 9 INJECTION, SOLUTION INTRAVENOUS at 16:46

## 2022-08-30 RX ADMIN — Medication 12.5 MG: at 21:52

## 2022-08-30 RX ADMIN — HYDROMORPHONE HYDROCHLORIDE 0.5 MG: 1 INJECTION, SOLUTION INTRAMUSCULAR; INTRAVENOUS; SUBCUTANEOUS at 07:29

## 2022-08-30 RX ADMIN — SODIUM CHLORIDE, PRESERVATIVE FREE 10 ML: 5 INJECTION INTRAVENOUS at 20:35

## 2022-08-30 RX ADMIN — HYDROMORPHONE HYDROCHLORIDE 0.5 MG: 1 INJECTION, SOLUTION INTRAMUSCULAR; INTRAVENOUS; SUBCUTANEOUS at 14:55

## 2022-08-30 RX ADMIN — HYDROMORPHONE HYDROCHLORIDE 0.5 MG: 1 INJECTION, SOLUTION INTRAMUSCULAR; INTRAVENOUS; SUBCUTANEOUS at 11:27

## 2022-08-30 RX ADMIN — DIPHENHYDRAMINE HCL 25 MG: 25 TABLET ORAL at 14:05

## 2022-08-30 RX ADMIN — SODIUM CHLORIDE, POTASSIUM CHLORIDE, SODIUM LACTATE AND CALCIUM CHLORIDE: 600; 310; 30; 20 INJECTION, SOLUTION INTRAVENOUS at 16:06

## 2022-08-30 RX ADMIN — ONDANSETRON 4 MG: 2 INJECTION INTRAMUSCULAR; INTRAVENOUS at 03:16

## 2022-08-30 RX ADMIN — SODIUM CHLORIDE, PRESERVATIVE FREE 10 ML: 5 INJECTION INTRAVENOUS at 07:28

## 2022-08-30 RX ADMIN — HYDROMORPHONE HYDROCHLORIDE 0.5 MG: 1 INJECTION, SOLUTION INTRAMUSCULAR; INTRAVENOUS; SUBCUTANEOUS at 18:21

## 2022-08-30 RX ADMIN — SODIUM CHLORIDE: 9 INJECTION, SOLUTION INTRAVENOUS at 21:51

## 2022-08-30 RX ADMIN — CEFTRIAXONE 1000 MG: 1 INJECTION, POWDER, FOR SOLUTION INTRAMUSCULAR; INTRAVENOUS at 09:31

## 2022-08-30 ASSESSMENT — PAIN DESCRIPTION - DESCRIPTORS
DESCRIPTORS: ACHING
DESCRIPTORS: ACHING
DESCRIPTORS: SHARP
DESCRIPTORS: SHARP
DESCRIPTORS: DULL
DESCRIPTORS: SHARP
DESCRIPTORS: SHARP
DESCRIPTORS: ACHING
DESCRIPTORS: SHARP

## 2022-08-30 ASSESSMENT — PAIN SCALES - GENERAL
PAINLEVEL_OUTOF10: 6
PAINLEVEL_OUTOF10: 10
PAINLEVEL_OUTOF10: 8
PAINLEVEL_OUTOF10: 5
PAINLEVEL_OUTOF10: 0
PAINLEVEL_OUTOF10: 9
PAINLEVEL_OUTOF10: 10
PAINLEVEL_OUTOF10: 9
PAINLEVEL_OUTOF10: 4
PAINLEVEL_OUTOF10: 9

## 2022-08-30 ASSESSMENT — PAIN DESCRIPTION - LOCATION
LOCATION: PELVIS;FLANK
LOCATION: BACK
LOCATION: ABDOMEN
LOCATION: ABDOMEN
LOCATION: FLANK;ABDOMEN
LOCATION: FLANK
LOCATION: FLANK;ABDOMEN
LOCATION: ABDOMEN
LOCATION: BACK

## 2022-08-30 ASSESSMENT — PAIN DESCRIPTION - ORIENTATION
ORIENTATION: RIGHT;LEFT
ORIENTATION: RIGHT;LEFT;ANTERIOR
ORIENTATION: LOWER
ORIENTATION: RIGHT;LEFT
ORIENTATION: RIGHT;LEFT

## 2022-08-30 ASSESSMENT — LIFESTYLE VARIABLES
HOW MANY STANDARD DRINKS CONTAINING ALCOHOL DO YOU HAVE ON A TYPICAL DAY: PATIENT DOES NOT DRINK
HOW OFTEN DO YOU HAVE A DRINK CONTAINING ALCOHOL: NEVER

## 2022-08-30 ASSESSMENT — PAIN DESCRIPTION - ONSET: ONSET: ON-GOING

## 2022-08-30 ASSESSMENT — PAIN DESCRIPTION - FREQUENCY: FREQUENCY: CONTINUOUS

## 2022-08-30 ASSESSMENT — PAIN DESCRIPTION - PAIN TYPE: TYPE: ACUTE PAIN

## 2022-08-30 ASSESSMENT — PAIN - FUNCTIONAL ASSESSMENT: PAIN_FUNCTIONAL_ASSESSMENT: PREVENTS OR INTERFERES SOME ACTIVE ACTIVITIES AND ADLS

## 2022-08-30 NOTE — ED PROVIDER NOTES
[address] [facility]      Pt Name: Alvaro Garcia  MRN: [id number]  [place] 1980  Date of evaluation: 8/29/2022  Provider: Girish Oquendo PA-C    This patient was not seen and evaluated by the attending physician No att. providers found. CHIEF COMPLAINT      Chief Complaint: flank pain, vomiting      HISTORYOF PRESENT ILLNESS  (Location/Symptom, Timing/Onset, Context/Setting, Quality, Duration, Modifying Factors, Severity.)   Alvaro Garcia is a 46 y.o. female who presents to the emergency department with bilateral flank pain and vomiting. She says she has been ill for several days. She has chronic recurrent UTIs associated with indwelling catheter. She follows with urology at [facility]. She was recently admitted here for UTI and vomiting and ended up signing out AMA. She says she really wishes she hadn't because she hasn't gotten any better. She has been on Macrobid and phenergan but is telling me she is unable to hold anything down including her chronic pain meds. Nursing Notes were reviewed and I agree. REVIEW OF SYSTEMS    (2-9 systems for level 4, 10 or more forlevel 5)     Review of Systems   Constitutional:  Negative for chills and fever. HENT:  Negative for sore throat. Respiratory:  Negative for shortness of breath. Cardiovascular:  Negative for chest pain. Gastrointestinal:  Positive for abdominal pain, nausea and vomiting. Genitourinary:  Positive for difficulty urinating, dysuria and flank pain. Musculoskeletal:  Negative for back pain. Skin:  Negative for rash. Neurological:  Negative for light-headedness and headaches. Psychiatric/Behavioral:  The patient is not nervous/anxious. All other systems reviewed and are negative. Positives and Pertinent negatives as per HPI. Except as noted above the remainder of the review of systems was reviewed and negative.        PAST MEDICALHISTORY Diagnosis Date    ADHD (attention deficit hyperactivity disorder) 1988    Depression with anxiety     Diabetes mellitus ([address]     pre-diabetes    Difficult intubation     Encounter for imaging to screen for metal prior to MRI 2021    MRI Conditional Medtronic Non-Programmable shunt model#50130 implanted 10/30/2020 at [facility]. Normal Mode. 1.5T or 3.0T. ESBL (extended spectrum beta-lactamase) producing bacteria infection 2019    urine    Functional ovarian cysts 2008    rt ovary cyst x 2 yrs.     Headache(784.0)     migraines    History of blood transfusion     at birth    History of kidney stones     History of PCOS     Hydrocephalus (HCC)     Hyperlipidemia     Irritable bowel syndrome 2004    had colonoscopy about 6 yrs ago    Meningitis     Neutrophilic leukocytosis     Nicotine dependence     PONV (postoperative nausea and vomiting)     very nauseated and sometimes wakes up with a Migraine--happened once after brain surgery    Primary osteoarthritis of left knee 2016    S/P cone biopsy of cervix 2004    Scoliosis 1990.s    Seizures ([address]      (ventriculoperitoneal) shunt status     hydrocephalus f/w Neurosurgeon at [facility]     (ventriculoperitoneal) shunt status     Wears glasses     reading       SURGICAL HISTORY           Procedure Laterality Date    ABDOMEN SURGERY N/A 2021    REMOVAL OF ABDOMINAL WALL MASS performed by Harshal Amaya MD at [facility]      appendicitis     SECTION      placenta previa    CHOLECYSTECTOMY      COLONOSCOPY  2004    COLPOSCOPY      CSF SHUNT      replaced at age 17    CYSTOSCOPY      stone removal    HEMORRHOID SURGERY      HERNIA REPAIR Bilateral     inguinal    HERNIA REPAIR      hiatal hernia    HYSTERECTOMY, TOTAL ABDOMINAL (CERVIX REMOVED)  2016    TAHBSO, adhesions/PCOS    KNEE ARTHROSCOPY Left 2015    medial meniscectomy, chondroplasty, plica resection LITHOTRIPSY Bilateral 12/10/2019    OTHER SURGICAL HISTORY  10/19/2016    op lap    VENTRICULOPERITONEAL SHUNT      multiple revisions, most recent 2015       CURRENT MEDICATIONS       Previous Medications    BUTALBITAL-ACETAMINOPHEN-CAFFEINE (FIORICET, ESGIC) -40 MG PER TABLET    Take 1 tablet by mouth every 6 hours as needed for Headaches    GABAPENTIN (NEURONTIN) 400 MG CAPSULE    Take 400 mg by mouth 3 times daily. OXYCODONE (ROXICODONE) 5 MG IMMEDIATE RELEASE TABLET    Take 5 mg by mouth every 6 hours as needed for Pain. SIMVASTATIN (ZOCOR) 10 MG TABLET    Take 10 mg by mouth daily    VYVANSE 50 MG CAPSULE    Take 1 capsule by mouth every morning for 30 days. Take 50 mg by mouth every morning.        ALLERGIES     Bee venom, Bentyl [dicyclomine hcl], Dicyclomine, Ketorolac tromethamine, Levofloxacin, [place], Mushroom extract complex, Oxycodone-acetaminophen, Sulfa antibiotics, Vancomycin, Adhesive tape, Dicyclomine hcl, Haldol [haloperidol], Shiitake mushroom, Sulfacetamide, Acetaminophen, Butalbital-apap-caff-cod, Ceftaroline, Daptomycin, Fosfomycin tromethamine, Ketamine, Methocarbamol, Morphine, Nitrofurantoin, Prochlorperazine, Reglan [metoclopramide], Silicone, Tazobactam, and Zosyn [piperacillin sod-tazobactam so]    FAMILY HISTORY           Problem Relation Age of Onset    High Blood Pressure Mother     Diabetes Mother     High Cholesterol Mother     Depression Mother     Diabetes Maternal Grandmother     High Blood Pressure Maternal Grandmother     High Cholesterol Maternal Grandmother     Heart Disease Maternal Grandfather     High Blood Pressure Maternal Grandfather     Heart Disease Paternal Grandmother     Stroke Paternal Grandfather     Depression Sister     Cirrhosis Father     Rheum Arthritis Neg Hx     Osteoarthritis Neg Hx     Asthma Neg Hx     Breast Cancer Neg Hx     Cancer Neg Hx     Heart Failure Neg Hx     Hypertension Neg Hx     Migraines Neg Hx     Ovarian Cancer Neg Hx Rashes/Skin Problems Neg Hx     Seizures Neg Hx     Thyroid Disease Neg Hx      Family Status   Relation Name Status    Mother  Alive    MGM  Alive        copd, goiter    MGF      PGM  Alive        alcoholic    PGF  Alive    Sister  Alive        scoliosis    Father   at age 50        cirhosis liver    Sister  Alive        scoliosis    Brother  Alive        adhd, mild autism    Neg Hx  (Not Specified)        SOCIAL HISTORY    reports that she has been smoking cigarettes. She has a 9.00 pack-year smoking history. She has never used smokeless tobacco. She reports that she does not drink alcohol and does not use drugs. PHYSICAL EXAM    (up to 7 for level 4, 8 or more for level 5)     ED Triage Vitals   BP Temp Temp Source Heart Rate Resp SpO2 Height Weight   22 2100 22 2100 22 2100 22 2100 22 2100 22 2104 22 210   (!) 151/86 99.1 °F (37.3 °C) Oral (!) 102 18 98 % 4' 11\" (1.499 m) 155 lb 6.8 oz (70.5 kg)       Physical Exam  Vitals and nursing note reviewed. Constitutional:       General: She is not in acute distress. Appearance: She is well-developed. HENT:      Head: Normocephalic and atraumatic. Cardiovascular:      Rate and Rhythm: Normal rate and regular rhythm. Heart sounds: Normal heart sounds. Pulmonary:      Effort: Pulmonary effort is normal. No respiratory distress. Breath sounds: Normal breath sounds. Abdominal:      Palpations: Abdomen is soft. Tenderness: There is abdominal tenderness (RUQ and suprapubic). There is right CVA tenderness and left CVA tenderness. Musculoskeletal:         General: Normal range of motion. Cervical back: Neck supple. Skin:     General: Skin is warm and dry. Neurological:      Mental Status: She is alert and oriented to person, place, and time.    Psychiatric:         Behavior: Behavior normal.          DIAGNOSTIC RESULTS           LABS:  Labs Reviewed   CBC WITH AUTO DIFFERENTIAL - Abnormal; Notable for the following components:       Result Value    WBC 11.1 (*)     All other components within normal limits   COMPREHENSIVE METABOLIC PANEL W/ REFLEX TO MG FOR LOW K - Abnormal; Notable for the following components:    Glucose 103 (*)     AST 11 (*)     All other components within normal limits   URINALYSIS WITH REFLEX TO CULTURE - Abnormal; Notable for the following components:    Clarity, UA CLOUDY (*)     Nitrite, Urine POSITIVE (*)     Leukocyte Esterase, Urine TRACE (*)     All other components within normal limits   MICROSCOPIC URINALYSIS - Abnormal; Notable for the following components:    Bacteria, UA 4+ (*)     WBC, UA 14 (*)     Epithelial Cells, UA 15 (*)     All other components within normal limits   CULTURE, URINE   MAGNESIUM       When ordered, only abnormal lab results are displayed. All other labs are within normal range or not returned as of the dictation. EMERGENCY DEPARTMENT COURSE and DIFFERENTIAL DIAGNOSIS/MDM:   Vitals:    Vitals:    08/29/22 2100 08/29/22 2104 08/29/22 2245 08/29/22 2300   BP: (!) 151/86  117/80 102/82   Pulse: (!) 102  80 67   Resp: 18  13 11   Temp: 99.1 °F (37.3 °C)      TempSrc: Oral      SpO2: 98%      Weight:  155 lb 6.8 oz (70.5 kg)     Height:  4' 11\" (1.499 m)          I have evaluated this patient. My supervising physician was available for consultation. She is nontoxic and afebrile at this time. The patient still has a UTI unfortunately that is nitrite positive. She has had multiple ER visits for this. She is telling me she cannot hold down any oral antibiotic ever even with the use of Phenergan suppositories. She is requesting Phenergan IV. This patient may need to be set up at the infusion center for outpatient administration of antibiotics if this is a recurrent issue. I suggested that. She does not feel like she can go home due to emesis.  I have consulted the hospitalist for admission to see if we can admit for inpatient management. Is this patient to be included in the SEP-1 Core Measure due to severe sepsis or septic shock? No   Exclusion criteria - the patient is NOT to be included for SEP-1 Core Measure due to:  2+ SIRS criteria are not met        PROCEDURES:  None    FINAL IMPRESSION      1. Pyelonephritis    2. Intractable vomiting with nausea, unspecified vomiting type          DISPOSITION/PLAN   DISPOSITION Decision To Admit 08/29/2022 10:59:04 PM      PATIENT REFERRED TO:  No follow-up provider specified.     MEDICATIONS:  New Prescriptions    No medications on file       (Please note that portions of this note were completed with a voice recognition program.  Efforts were made toedit the dictations but occasionally words are mis-transcribed.)    MICHELLE Bailey PA-C  08/30/22 0013

## 2022-08-30 NOTE — PROGRESS NOTES
Hospitalist update note. Patient admitted by my colleague, Dr. Jayy Blanton, earlier this morning. Reviewed note and ordered placed. Agree with current plan. Follow pending urine culture result.       SIGNED: Augustin Orozco MD, MPH. 8/30/2022

## 2022-08-30 NOTE — ED TRIAGE NOTES
Patient to the ER with complaints of bilateral flank pain and lower back pain. She was admitted to this hospital earlier this week for kidney infection/UTI. She was released 6 days ago and reports fevers and she had a syncopal episode today. She also reports emesis d/t the pain and has not been keeping anything down x1 week.

## 2022-08-30 NOTE — H&P
Hospital Medicine History & Physical      PCP: FREDRICK Maddox - CNP    Date of Admission: 2022    Date of Service: Pt seen/examined on 22 and Admitted to Inpatient with expected LOS greater than two midnights     Chief Complaint: Intractable nausea with vomiting      History Of Present Illness:    46 y.o. female PMH ADHD, depression with anxiety, DM ESBL bacterial infections frequent kidney stones PCOS hydrocephalus with  shunt HLD IBS seizures  Was just seen in the office of general surgeon Dr. Munoz Mins 2022 to discuss possible hernia due to complaints of lower abdominal pain. Patient states she has a small hernia at her previous  scar site but the surgeon indicated she cannot be a candidate for repair until the infections are fully treated. Last seen in the ER 2022. Kidney stones had Guzman placed the week before. She continues to complain of lower abdominal pain nausea with vomiting seen in the ER 2020 to 2022 and reportedly at [facility] ER somewhere within that timeframe. 2022 right flank pain with urine retention. CT 2022, 2022 nonobstructing 4 mm right renal stone no hydronephrosis right upper abdomen  shunt. X-ray shunt series 7/10/2022 negative  Urine culture 2022 greater than 100,000 E. coli pansensitive. Urine culture 2022 [facility]> 100,000 E. coli and [id number] Enterococcus faecalis      Past Medical History:          Diagnosis Date    ADHD (attention deficit hyperactivity disorder) 1988    Depression with anxiety     Diabetes mellitus ([facility] [address]     pre-diabetes    Difficult intubation     Encounter for imaging to screen for metal prior to MRI 2021    MRI Conditional Medtronic Non-Programmable shunt model#48807 implanted 10/30/2020 at [facility]. Normal Mode. 1.5T or 3.0T.     ESBL (extended spectrum beta-lactamase) producing bacteria infection 2019    urine    Functional ovarian cysts 2008    rt ovary cyst x 2 yrs. Headache(784.0)     migraines    History of blood transfusion     at birth    History of kidney stones     History of PCOS     Hydrocephalus (HCC)     Hyperlipidemia     Irritable bowel syndrome 2004    had colonoscopy about 6 yrs ago    Meningitis     Neutrophilic leukocytosis     Nicotine dependence     PONV (postoperative nausea and vomiting)     very nauseated and sometimes wakes up with a Migraine--happened once after brain surgery    Primary osteoarthritis of left knee 2016    S/P cone biopsy of cervix 2004    Scoliosis 1990.s    Seizures ([address]      (ventriculoperitoneal) shunt status     hydrocephalus f/w Neurosurgeon at [facility]     (ventriculoperitoneal) shunt status     Wears glasses     reading       Past Surgical History:          Procedure Laterality Date    ABDOMEN SURGERY N/A 2021    REMOVAL OF ABDOMINAL WALL MASS performed by Hamlet Esteban MD at [facility]      appendicitis     SECTION  2005    placenta previa    CHOLECYSTECTOMY      COLONOSCOPY      COLPOSCOPY      CSF SHUNT      replaced at age 17    CYSTOSCOPY      stone removal    HEMORRHOID SURGERY      HERNIA REPAIR Bilateral     inguinal    HERNIA REPAIR      hiatal hernia    HYSTERECTOMY, TOTAL ABDOMINAL (CERVIX REMOVED)  2016    TAHBSO, adhesions/PCOS    KNEE ARTHROSCOPY Left 2015    medial meniscectomy, chondroplasty, plica resection    LITHOTRIPSY Bilateral 12/10/2019    OTHER SURGICAL HISTORY  10/19/2016    op lap    VENTRICULOPERITONEAL SHUNT      multiple revisions, most recent        Medications Prior to Admission:      Prior to Admission medications    Medication Sig Start Date End Date Taking?  Authorizing Provider   simvastatin (ZOCOR) 10 MG tablet Take 10 mg by mouth daily   Yes Historical Provider, MD   butalbital-acetaminophen-caffeine (FIORICET, ESGIC) -40 MG per tablet Take 1 tablet by mouth every 6 hours as needed for Headaches   Yes Historical Provider, MD   gabapentin (NEURONTIN) 400 MG capsule Take 400 mg by mouth 3 times daily. Historical Provider, MD   oxyCODONE (ROXICODONE) 5 MG immediate release tablet Take 5 mg by mouth every 6 hours as needed for Pain. Historical Provider, MD   VYVANSE 50 MG capsule Take 1 capsule by mouth every morning for 30 days. Take 50 mg by mouth every morning. 8/8/22 9/7/22  Dilia Duran, APRN - CNP       Allergies:  Bee venom, Bentyl [dicyclomine hcl], Dicyclomine, Ketorolac tromethamine, Levofloxacin, [place], Mushroom extract complex, Oxycodone-acetaminophen, Sulfa antibiotics, Vancomycin, Adhesive tape, Dicyclomine hcl, Haldol [haloperidol], Shiitake mushroom, Sulfacetamide, Acetaminophen, Butalbital-apap-caff-cod, Ceftaroline, Daptomycin, Fosfomycin tromethamine, Ketamine, Methocarbamol, Morphine, Nitrofurantoin, Prochlorperazine, Reglan [metoclopramide], Silicone, Tazobactam, and Zosyn [piperacillin sod-tazobactam so]    Social History:        TOBACCO:   reports that she has been smoking cigarettes. She has a 9.00 pack-year smoking history. She has never used smokeless tobacco.  ETOH:   reports no history of alcohol use.   E-cigarette/Vaping       Questions Responses    E-cigarette/Vaping Use Never User    Start Date     Passive Exposure     Quit Date     Counseling Given     Comments               Family History:            Problem Relation Age of Onset    High Blood Pressure Mother     Diabetes Mother     High Cholesterol Mother     Depression Mother     Diabetes Maternal Grandmother     High Blood Pressure Maternal Grandmother     High Cholesterol Maternal Grandmother     Heart Disease Maternal Grandfather     High Blood Pressure Maternal Grandfather     Heart Disease Paternal Grandmother     Stroke Paternal Grandfather     Depression Sister     Cirrhosis Father     Rheum Arthritis Neg Hx     Osteoarthritis Neg Hx     Asthma Neg Hx     Breast Cancer Neg Hx     Cancer Neg Hx     Heart Failure Neg Hx     Hypertension Neg Hx     Migraines Neg Hx     Ovarian Cancer Neg Hx     Rashes/Skin Problems Neg Hx     Seizures Neg Hx     Thyroid Disease Neg Hx        REVIEW OF SYSTEMS COMPLETED:   Pertinent positives as noted in the HPI. All other systems reviewed and negative. PHYSICAL EXAM PERFORMED:    /68   Pulse 74   Temp 99.1 °F (37.3 °C) (Oral)   Resp 24   Ht 4' 11\" (1.499 m)   Wt 155 lb 6.8 oz (70.5 kg)   LMP 10/31/2016 (Exact Date)   SpO2 98%   BMI 31.39 kg/m²     General appearance: Awake alert oriented able to move self about in the bed  HEENT:  Normal cephalic, atraumatic without obvious deformity. Pupils equal, round, and reactive to light. Extra ocular muscles intact. Conjunctivae/corneas clear. Anicteric oromucosa moist good dentition  Neck: Supple,  full range of motion. No JVD Trachea midline moves neck freely sits self up in bed   Respiratory:  Normal respiratory effort. Clear to auscultation  Cardiovascular:  RRR S1/S2 without murmurs, rubs or gallops. Abdomen: Soft, non-tender, non-distended with normal bowel sounds. no HSM no tenderness over where the  shunt should be  Musculoskeletal:  No clubbing, cyanosis or edema bilaterally. Skin: Skin color tan   Neurologic:  Neurovascularly intact without any focal sensory/motor deficits. Cranial nerves: II-XII intact, grossly non-focal.  No tremors strength 5 out of 5  Psychiatric:  Alert and oriented, thought content appropriate, normal insight  Capillary Refill: Brisk,3 seconds, normal  Peripheral Pulses: +2 palpable, equal bilaterally       Labs:     Recent Labs     08/29/22 2119   WBC 11.1*   HGB 14.3   HCT 42.0        Recent Labs     08/29/22 2119      K 3.5      CO2 22   BUN 15   CREATININE 0.6   CALCIUM 9.8     Recent Labs     08/29/22 2119   AST 11*   ALT 12   BILITOT 0.3   ALKPHOS 111     No results for input(s): INR in the last 72 hours.   No results for input(s): CKTOTAL, TROPONINI in the last 72 hours. Urinalysis:      Lab Results   Component Value Date/Time    NITRU POSITIVE 08/29/2022 09:49 PM    WBCUA 14 08/29/2022 09:49 PM    BACTERIA 4+ 08/29/2022 09:49 PM    RBCUA 2 08/29/2022 09:49 PM    BLOODU Negative 08/29/2022 09:49 PM    SPECGRAV 1.021 08/29/2022 09:49 PM    GLUCOSEU Negative 08/29/2022 09:49 PM    GLUCOSEU NEGATIVE 10/08/2011 10:10 PM       Radiology:         No orders to display       Consults:    None    ASSESSMENT:    Intractable nausea with vomiting  --Doubt  shunt related she is awake nontender at that site  --Asking for Phenergan as needed stating Zofran does not work is allergic to Reglan  Recurrent urinary tract infections   shunt  Probable pyelonephritis  Chronic right nephrolithiasis  Bilateral lower quadrant abdominal pain  --She does follow with an outpatient urologist  --Urine culture 8/25 intermediate Enterococcus faecalis, E. Coli. If we want a make sure we cover the Enterococcus could consider vancomycin, tolerated the Rocephin in the ER we will continue E. coli pansensitive. --Etiology unclear, CT only shows a nonobstructing kidney stone on the right  --Consider involving ID if she truly has pyelonephritis may warrant a more prolonged IV antibiotic course as our choice for orals may be limited due to multiple allergies complained of a bilateral flank pain    Diarrhea  --Monitor output during hospital stay she said she has had some diarrhea but not today  Presentation does not sound consistent with C. difficile. CT of the abdomen and pelvis is been negative for intestinal problems          DVT Prophylaxis: Lovenox 40 mg subcu daily  Diet: No diet orders on file clear liquid advance as tolerated  Code Status: Prior    PT/OT Eval Status: Independent    Dispo -return to home setting upon discharge       Berkley Schroeder MD    Thank you FREDRICK Mosher - AARON for the opportunity to be involved in this patient's care.  If you have any questions or concerns please feel free to contact me at [phone].

## 2022-08-30 NOTE — PROGRESS NOTES
Pharmacy Medication Reconciliation Note     List of medications Fahad Serrano is currently taking is complete. Source of information:   1. Conversation with patient at bedside  2. EMR    Notes regarding home medications:   1. Patient reports not having taken any home medications for several days due to emesis. 2. Patient prescribed Macrobid on 8/25/2022 and fosfomycin on 8/27/2022 but states she was unable to keep anything down due to emesis while taking. Patient denies taking any OTC or herbal medications other than those listed.      Luis Alba, Pharmacy Intern  8/29/2022  10:49 PM

## 2022-08-30 NOTE — PROGRESS NOTES
Pt c/o pain and nausea. Pt also states she feels dizzy. Pt hypotensive 90/50. Not taking PO do to nausea. No fluids ordered. MD peterson served and notified.  New orders placed

## 2022-08-30 NOTE — ED NOTES
Offered patient her morning oral medications; patient is still refusing PO meds due to feeling like she will throw them up.       Alexus Connolly RN  08/30/22 [id number]

## 2022-08-30 NOTE — PROGRESS NOTES
Physician Progress Note      Patsy Valente  CSN #:                  [id number]  :                       1980  ADMIT DATE:       2022 2:46 PM  Elio Mariscal [place] DATE:        2022 1:44 PM  RESPONDING  PROVIDER #:        Iqra Patten MD          QUERY TEXT:    Pt admitted with UTI. Pt noted to have chronic indwelling urinary catheter. If possible, please document in the progress notes and discharge summary if   you are evaluating and/or treating any of the following: The medical record reflects the following:  Risk Factors: chronic silva  Clinical Indicators: per H&P HPI \"she has Silva catheter at home due to   history of passing kidney stones. \"  Treatment: IV antibiotics and Urology consult  Options provided:  -- UTI due to chronic indwelling urinary catheter  -- Other - I will add my own diagnosis  -- Disagree - Not applicable / Not valid  -- Disagree - Clinically unable to determine / Unknown  -- Refer to Clinical Documentation Reviewer    PROVIDER RESPONSE TEXT:    UTI is due to the chronic indwelling urinary catheter.     Query created by: Lamin Yao on 2022 6:10 AM      Electronically signed by:  Iqra Patten MD 2022 10:55 AM

## 2022-08-31 LAB
ANION GAP SERPL CALCULATED.3IONS-SCNC: 8 MMOL/L (ref 3–16)
BASOPHILS ABSOLUTE: 0.1 K/UL (ref 0–0.2)
BASOPHILS RELATIVE PERCENT: 0.8 %
BUN BLDV-MCNC: 9 MG/DL (ref 7–20)
CALCIUM SERPL-MCNC: 8.4 MG/DL (ref 8.3–10.6)
CHLORIDE BLD-SCNC: 106 MMOL/L (ref 99–110)
CO2: 25 MMOL/L (ref 21–32)
CREAT SERPL-MCNC: 0.7 MG/DL (ref 0.6–1.1)
EOSINOPHILS ABSOLUTE: 0.2 K/UL (ref 0–0.6)
EOSINOPHILS RELATIVE PERCENT: 2.7 %
GFR AFRICAN AMERICAN: >60
GFR NON-AFRICAN AMERICAN: >60
GLUCOSE BLD-MCNC: 121 MG/DL (ref 70–99)
HCT VFR BLD CALC: 34 % (ref 36–48)
HEMOGLOBIN: 11.7 G/DL (ref 12–16)
LYMPHOCYTES ABSOLUTE: 2.8 K/UL (ref 1–5.1)
LYMPHOCYTES RELATIVE PERCENT: 40.5 %
MAGNESIUM: 2 MG/DL (ref 1.8–2.4)
MCH RBC QN AUTO: 28.1 PG (ref 26–34)
MCHC RBC AUTO-ENTMCNC: 34.3 G/DL (ref 31–36)
MCV RBC AUTO: 81.8 FL (ref 80–100)
MONOCYTES ABSOLUTE: 0.5 K/UL (ref 0–1.3)
MONOCYTES RELATIVE PERCENT: 7.1 %
NEUTROPHILS ABSOLUTE: 3.4 K/UL (ref 1.7–7.7)
NEUTROPHILS RELATIVE PERCENT: 48.9 %
ORGANISM: ABNORMAL
PDW BLD-RTO: 14.8 % (ref 12.4–15.4)
PLATELET # BLD: 237 K/UL (ref 135–450)
PMV BLD AUTO: 7.7 FL (ref 5–10.5)
POTASSIUM REFLEX MAGNESIUM: 3.5 MMOL/L (ref 3.5–5.1)
PROCALCITONIN: 0.03 NG/ML (ref 0–0.15)
RBC # BLD: 4.16 M/UL (ref 4–5.2)
SODIUM BLD-SCNC: 139 MMOL/L (ref 136–145)
URINE CULTURE, ROUTINE: ABNORMAL
WBC # BLD: 7 K/UL (ref 4–11)

## 2022-08-31 PROCEDURE — 83735 ASSAY OF MAGNESIUM: CPT

## 2022-08-31 PROCEDURE — 6360000002 HC RX W HCPCS: Performed by: HOSPITALIST

## 2022-08-31 PROCEDURE — 6360000002 HC RX W HCPCS: Performed by: NURSE PRACTITIONER

## 2022-08-31 PROCEDURE — 84145 PROCALCITONIN (PCT): CPT

## 2022-08-31 PROCEDURE — 36415 COLL VENOUS BLD VENIPUNCTURE: CPT

## 2022-08-31 PROCEDURE — 99223 1ST HOSP IP/OBS HIGH 75: CPT | Performed by: INTERNAL MEDICINE

## 2022-08-31 PROCEDURE — 6360000002 HC RX W HCPCS: Performed by: INTERNAL MEDICINE

## 2022-08-31 PROCEDURE — 2580000003 HC RX 258: Performed by: HOSPITALIST

## 2022-08-31 PROCEDURE — 80048 BASIC METABOLIC PNL TOTAL CA: CPT

## 2022-08-31 PROCEDURE — 1200000000 HC SEMI PRIVATE

## 2022-08-31 PROCEDURE — 6370000000 HC RX 637 (ALT 250 FOR IP): Performed by: HOSPITALIST

## 2022-08-31 PROCEDURE — 85025 COMPLETE CBC W/AUTO DIFF WBC: CPT

## 2022-08-31 PROCEDURE — 2580000003 HC RX 258: Performed by: INTERNAL MEDICINE

## 2022-08-31 PROCEDURE — 6370000000 HC RX 637 (ALT 250 FOR IP): Performed by: INTERNAL MEDICINE

## 2022-08-31 RX ORDER — POTASSIUM CHLORIDE 20 MEQ/1
40 TABLET, EXTENDED RELEASE ORAL PRN
Status: DISCONTINUED | OUTPATIENT
Start: 2022-08-31 | End: 2022-09-02 | Stop reason: HOSPADM

## 2022-08-31 RX ORDER — HYDROXYZINE HYDROCHLORIDE 25 MG/1
25 TABLET, FILM COATED ORAL EVERY 6 HOURS PRN
Status: DISCONTINUED | OUTPATIENT
Start: 2022-08-31 | End: 2022-09-02 | Stop reason: HOSPADM

## 2022-08-31 RX ORDER — POTASSIUM CHLORIDE 7.45 MG/ML
10 INJECTION INTRAVENOUS PRN
Status: DISCONTINUED | OUTPATIENT
Start: 2022-08-31 | End: 2022-09-02 | Stop reason: HOSPADM

## 2022-08-31 RX ORDER — PHENAZOPYRIDINE HYDROCHLORIDE 200 MG/1
200 TABLET, FILM COATED ORAL
Status: DISPENSED | OUTPATIENT
Start: 2022-08-31 | End: 2022-09-02

## 2022-08-31 RX ADMIN — SODIUM CHLORIDE, POTASSIUM CHLORIDE, SODIUM LACTATE AND CALCIUM CHLORIDE: 600; 310; 30; 20 INJECTION, SOLUTION INTRAVENOUS at 06:28

## 2022-08-31 RX ADMIN — MEROPENEM 1000 MG: 1 INJECTION, POWDER, FOR SOLUTION INTRAVENOUS at 23:34

## 2022-08-31 RX ADMIN — HYDROMORPHONE HYDROCHLORIDE 0.25 MG: 1 INJECTION, SOLUTION INTRAMUSCULAR; INTRAVENOUS; SUBCUTANEOUS at 18:17

## 2022-08-31 RX ADMIN — SODIUM CHLORIDE, PRESERVATIVE FREE 10 ML: 5 INJECTION INTRAVENOUS at 20:19

## 2022-08-31 RX ADMIN — SODIUM CHLORIDE: 9 INJECTION, SOLUTION INTRAVENOUS at 03:23

## 2022-08-31 RX ADMIN — HYDROMORPHONE HYDROCHLORIDE 0.25 MG: 1 INJECTION, SOLUTION INTRAMUSCULAR; INTRAVENOUS; SUBCUTANEOUS at 14:10

## 2022-08-31 RX ADMIN — SODIUM CHLORIDE: 9 INJECTION, SOLUTION INTRAVENOUS at 23:33

## 2022-08-31 RX ADMIN — Medication 12.5 MG: at 14:12

## 2022-08-31 RX ADMIN — HYDROMORPHONE HYDROCHLORIDE 0.25 MG: 1 INJECTION, SOLUTION INTRAMUSCULAR; INTRAVENOUS; SUBCUTANEOUS at 22:27

## 2022-08-31 RX ADMIN — MEROPENEM 1000 MG: 1 INJECTION, POWDER, FOR SOLUTION INTRAVENOUS at 16:27

## 2022-08-31 RX ADMIN — HYDROMORPHONE HYDROCHLORIDE 0.5 MG: 1 INJECTION, SOLUTION INTRAMUSCULAR; INTRAVENOUS; SUBCUTANEOUS at 03:49

## 2022-08-31 RX ADMIN — SODIUM CHLORIDE, PRESERVATIVE FREE 10 ML: 5 INJECTION INTRAVENOUS at 09:53

## 2022-08-31 RX ADMIN — ENOXAPARIN SODIUM 40 MG: 100 INJECTION SUBCUTANEOUS at 09:53

## 2022-08-31 RX ADMIN — ONDANSETRON 4 MG: 4 TABLET, ORALLY DISINTEGRATING ORAL at 06:47

## 2022-08-31 RX ADMIN — DIPHENHYDRAMINE HCL 25 MG: 25 TABLET ORAL at 02:06

## 2022-08-31 RX ADMIN — HYDROMORPHONE HYDROCHLORIDE 0.5 MG: 1 INJECTION, SOLUTION INTRAMUSCULAR; INTRAVENOUS; SUBCUTANEOUS at 09:53

## 2022-08-31 RX ADMIN — SODIUM CHLORIDE, POTASSIUM CHLORIDE, SODIUM LACTATE AND CALCIUM CHLORIDE: 600; 310; 30; 20 INJECTION, SOLUTION INTRAVENOUS at 16:24

## 2022-08-31 RX ADMIN — HYDROMORPHONE HYDROCHLORIDE 0.5 MG: 1 INJECTION, SOLUTION INTRAMUSCULAR; INTRAVENOUS; SUBCUTANEOUS at 06:47

## 2022-08-31 RX ADMIN — LINEZOLID 600 MG: 600 INJECTION, SOLUTION INTRAVENOUS at 03:24

## 2022-08-31 RX ADMIN — PHENAZOPYRIDINE HYDROCHLORIDE 200 MG: 200 TABLET ORAL at 12:48

## 2022-08-31 RX ADMIN — OXYCODONE 5 MG: 5 TABLET ORAL at 20:19

## 2022-08-31 RX ADMIN — ONDANSETRON 4 MG: 4 TABLET, ORALLY DISINTEGRATING ORAL at 22:28

## 2022-08-31 RX ADMIN — CEFTRIAXONE 1000 MG: 1 INJECTION, POWDER, FOR SOLUTION INTRAMUSCULAR; INTRAVENOUS at 09:58

## 2022-08-31 RX ADMIN — HYDROMORPHONE HYDROCHLORIDE 0.5 MG: 1 INJECTION, SOLUTION INTRAMUSCULAR; INTRAVENOUS; SUBCUTANEOUS at 00:40

## 2022-08-31 ASSESSMENT — PAIN DESCRIPTION - ORIENTATION
ORIENTATION: RIGHT;LOWER
ORIENTATION: RIGHT
ORIENTATION: RIGHT;LEFT
ORIENTATION: RIGHT;LOWER
ORIENTATION: RIGHT;LOWER
ORIENTATION: RIGHT

## 2022-08-31 ASSESSMENT — PAIN SCALES - GENERAL
PAINLEVEL_OUTOF10: 6
PAINLEVEL_OUTOF10: 9
PAINLEVEL_OUTOF10: 6
PAINLEVEL_OUTOF10: 8
PAINLEVEL_OUTOF10: 9
PAINLEVEL_OUTOF10: 9

## 2022-08-31 ASSESSMENT — PAIN DESCRIPTION - LOCATION
LOCATION: BACK
LOCATION: VAGINA;OTHER (COMMENT)
LOCATION: BACK

## 2022-08-31 ASSESSMENT — PAIN DESCRIPTION - PAIN TYPE
TYPE: ACUTE PAIN
TYPE: ACUTE PAIN

## 2022-08-31 ASSESSMENT — PAIN DESCRIPTION - DESCRIPTORS
DESCRIPTORS: SHARP
DESCRIPTORS: ACHING

## 2022-08-31 NOTE — PLAN OF CARE
Problem: Discharge Planning  Goal: Discharge to home or other facility with appropriate resources  Outcome: Progressing  Flowsheets (Taken 8/30/2022 1626 by Danisha Street)  Discharge to home or other facility with appropriate resources: Identify barriers to discharge with patient and caregiver     Problem: Pain  Goal: Verbalizes/displays adequate comfort level or baseline comfort level  Outcome: Progressing     Problem: Safety - Adult  Goal: Free from fall injury  Outcome: Progressing     Problem: ABCDS Injury Assessment  Goal: Absence of physical injury  Outcome: Progressing  Flowsheets (Taken 8/30/2022 1622 by Danisha Street)  Absence of Physical Injury: Implement safety measures based on patient assessment

## 2022-08-31 NOTE — CONSULTS
Infectious Diseases Inpatient Consult Note      Reason for Consult:  UTI ESBL E coli,  shunt in place     Requesting Physician:   Dr. Elise Berg    Primary Care Physician:  FREDRICK Galdamez CNP    History Obtained From:  [facility] and patient      CHIEF COMPLAINT:     Chief Complaint   Patient presents with    Flank Pain    Emesis         HISTORY OF PRESENT ILLNESS:  46 y.o. woman with a significant history for ADHD, depression, prediabetes, history of ESBL infection in the past, history of kidney stones history of hydrocephalus requiring  shunt placement with admission multiple times, smoking, history of seizures, admitted to the hospital secondary to abdominal pain fevers and some flank pain. She has a history of urinary tract infections in the past.  Patient indicates she had a fever at home up to 102 associated with some flank pain and concern for urinary tract infection. Admission labs indicate WBC 9.1 hemoglobin stable creatinine 0.6. Urinalysis is abnormal, but indicates 15 epithelial cells urine culture positive for ESBL E. Coli. CT abdomen pelvis nonobstructing right renal stone otherwise no hydronephrosis. Given the need for IV antibiotic and consult for recommendations      Past Medical History:    Past Medical History:   Diagnosis Date    ADHD (attention deficit hyperactivity disorder) 1988    Depression with anxiety     Diabetes mellitus ([address]     pre-diabetes    Difficult intubation     Encounter for imaging to screen for metal prior to MRI 06/01/2021    MRI Conditional Med[employer]onic Non-Programmable shunt model#03943 implanted 10/30/2020 at [facility]. Normal Mode. 1.5T or 3.0T. ESBL (extended spectrum beta-lactamase) producing bacteria infection 11/06/2019    urine    Functional ovarian cysts 2008    rt ovary cyst x 2 yrs.     Headache(784.0)     migraines    History of blood transfusion     at birth    History of kidney stones     History of PCOS     Hydrocephalus ([address] Hyperlipidemia     Irritable bowel syndrome 2004    had colonoscopy about 6 yrs ago    Meningitis     Neutrophilic leukocytosis     Nicotine dependence     PONV (postoperative nausea and vomiting)     very nauseated and sometimes wakes up with a Migraine--happened once after brain surgery    Primary osteoarthritis of left knee 2016    S/P cone biopsy of cervix 2004    Scoliosis 1990.s    Seizures ([address]      (ventriculoperitoneal) shunt status     hydrocephalus f/w Neurosurgeon at [facility]     (ventriculoperitoneal) shunt status     Wears glasses     reading       Past Surgical History:    Past Surgical History:   Procedure Laterality Date    ABDOMEN SURGERY N/A 2021    REMOVAL OF ABDOMINAL WALL MASS performed by Lata Jones MD at [facility]      appendicitis     SECTION  2005    placenta previa    CHOLECYSTECTOMY      COLONOSCOPY  2004    COLPOSCOPY      CSF SHUNT      replaced at age 89 [facility]    CYSTOSCOPY      stone removal    HEMORRHOID SURGERY      HERNIA REPAIR Bilateral     inguinal    HERNIA REPAIR      hiatal hernia    HYSTERECTOMY, TOTAL ABDOMINAL (CERVIX REMOVED)  2016    TAHBSO, adhesions/PCOS    KNEE ARTHROSCOPY Left 2015    medial meniscectomy, chondroplasty, plica resection    LITHOTRIPSY Bilateral 12/10/2019    OTHER SURGICAL HISTORY  10/19/2016    op lap    VENTRICULOPERITONEAL SHUNT      multiple revisions, most recent        Current Medications:    Outpatient Medications Marked as Taking for the 22 encounter [facility] HOSPITAL Encounter)   Medication Sig Dispense Refill    simvastatin (ZOCOR) 10 MG tablet Take 10 mg by mouth daily      butalbital-acetaminophen-caffeine (FIORICET, ESGIC) -40 MG per tablet Take 1 tablet by mouth every 6 hours as needed for Headaches         Allergies:  Bee venom, Bentyl [dicyclomine hcl], Dicyclomine, Ketorolac tromethamine, Levofloxacin, [place], Mushroom extract complex, Oxycodone-acetaminophen, Sulfa antibiotics, Vancomycin, Adhesive tape, Dicyclomine hcl, Haldol [haloperidol], Shiitake mushroom, Sulfacetamide, Acetaminophen, Butalbital-apap-caff-cod, Ceftaroline, Daptomycin, Fosfomycin tromethamine, Ketamine, Methocarbamol, Morphine, Nitrofurantoin, Prochlorperazine, Reglan [metoclopramide], Silicone, Tazobactam, and Zosyn [piperacillin sod-tazobactam so]    Immunizations :   Immunization History   Administered Date(s) Administered    COVID-19, PFIZER PURPLE top, DILUTE for use, (age 15 y+), 30mcg/0.3mL 11/10/2021    Influenza Vaccine, unspecified formulation 10/19/2013    Influenza Virus Vaccine 11/04/2010, 10/06/2018, 10/30/2020, 11/02/2021         Social History:    Social History     Tobacco Use    Smoking status: Every Day     Packs/day: 0.50     Years: 18.00     Pack years: 9.00     Types: Cigarettes    Smokeless tobacco: Never   Vaping Use    Vaping Use: Never used   Substance Use Topics    Alcohol use: No     Alcohol/week: 0.0 standard drinks    Drug use: Never     Social History     Tobacco Use   Smoking Status Every Day    Packs/day: 0.50    Years: 18.00    Pack years: 9.00    Types: Cigarettes   Smokeless Tobacco Never      Family History   Problem Relation Age of Onset    High Blood Pressure Mother     Diabetes Mother     High Cholesterol Mother     Depression Mother     Diabetes Maternal Grandmother     High Blood Pressure Maternal Grandmother     High Cholesterol Maternal Grandmother     Heart Disease Maternal Grandfather     High Blood Pressure Maternal Grandfather     Heart Disease Paternal Grandmother     Stroke Paternal Grandfather     Depression Sister     Cirrhosis Father     Rheum Arthritis Neg Hx     Osteoarthritis Neg Hx     Asthma Neg Hx     Breast Cancer Neg Hx     Cancer Neg Hx     Heart Failure Neg Hx     Hypertension Neg Hx     Migraines Neg Hx     Ovarian Cancer Neg Hx     Rashes/Skin Problems Neg Hx     Seizures Neg Hx     Thyroid Disease Neg Hx REVIEW OF SYSTEMS:      Constitutional:  negative for fevers, chills, night sweats  Eyes:  negative for blurred vision, eye discharge, visual disturbance   HEENT:  negative for hearing loss, ear drainage,nasal congestion  Respiratory:  negative for cough, shortness of breath or hemoptysis   Cardiovascular:  negative for chest pain, palpitations, syncope  Gastrointestinal:  negative for nausea, vomiting, diarrhea, constipation, abdominal pain  Genitourinary:   frequency+  dysuria,+  urinary incontinence, hematuria  Hematologic/Lymphatic:  negative for easy bruising, bleeding and lymphadenopathy  Allergic/Immunologic:  negative for recurrent infections, angioedema, anaphylaxis   Endocrine:  negative for weight changes, polyuria, polydipsia and polyphagia  Musculoskeletal:  negative for joint  pain, swelling, decreased range of motion  Integumentary: No rashes, skin lesions  Neurological:  negative for headaches, slurred speech, unilateral weakness  Psychiatric: negative for hallucinations,confusion,agitation.      PHYSICAL EXAM:      Vitals:    /73   Pulse 64   Temp 98.5 °F (36.9 °C) (Oral)   Resp 18   Ht 4' 11\" (1.499 m)   Wt 160 lb 7.9 oz (72.8 kg)   LMP 10/31/2016 (Exact Date)   SpO2 96%   BMI 32.42 kg/m²     General Appearance: alert,in no acute distress, + pallor, no icterus   Skin: warm and dry, no rash or erythema  Head: normocephalic and atraumatic  Eyes: pupils equal, round, and reactive to light, conjunctivae normal  ENT: tympanic membrane, external ear and ear canal normal bilaterally, nose without deformity, nasal mucosa and turbinates normal without polyps  Neck: supple and non-tender without mass, no thyromegaly  no cervical lymphadenopathy  Pulmonary/Chest: clear to auscultation bilaterally- no wheezes, rales or rhonchi, normal air movement, no respiratory distress  Cardiovascular: normal rate, regular rhythm, normal S1 and S2, no murmurs, rubs, clicks, or gallops, no carotid bruits  Abdomen: soft, non-tender, non-distended, normal bowel sounds, no masses or organomegaly  Extremities: no cyanosis, clubbing or edema  Musculoskeletal: normal range of motion, no joint swelling, deformity or tenderness  Integumentary: No rashes, no abnormal skin lesions, no petechiae  Neurologic: reflexes normal and symmetric, no cranial nerve deficit  Psych:  Orientation, sensorium, mood normal   Lines: IV   shunt in place -     DATA:    CBC:   Lab Results   Component Value Date    WBC 7.0 08/31/2022    HGB 11.7 (L) 08/31/2022    HCT 34.0 (L) 08/31/2022    MCV 81.8 08/31/2022     08/31/2022     RENAL:   Lab Results   Component Value Date    CREATININE 0.7 08/31/2022    BUN 9 08/31/2022     08/31/2022    K 3.5 08/31/2022     08/31/2022    CO2 25 08/31/2022     SED RATE:   Lab Results   Component Value Date/Time    SEDRATE 17 05/31/2021 12:08 AM     CK: No results found for: CKTOTAL  CRP: No results found for: CRP  Hepatic Function Panel:   Lab Results   Component Value Date/Time    ALKPHOS 111 08/29/2022 09:19 PM    ALT 12 08/29/2022 09:19 PM    AST 11 08/29/2022 09:19 PM    PROT 7.1 08/29/2022 09:19 PM    PROT 7.3 08/14/2011 07:30 PM    BILITOT 0.3 08/29/2022 09:19 PM    BILIDIR <0.2 07/10/2022 11:24 PM    IBILI see below 07/10/2022 11:24 PM    LABALBU 4.4 08/29/2022 09:19 PM     UA:  Lab Results   Component Value Date/Time    COLORU Yellow 08/29/2022 09:49 PM    CLARITYU CLOUDY 08/29/2022 09:49 PM    GLUCOSEU Negative 08/29/2022 09:49 PM    GLUCOSEU NEGATIVE 10/08/2011 10:10 PM    BILIRUBINUR Negative 08/29/2022 09:49 PM    BILIRUBINUR neg 10/25/2019 12:54 PM    BILIRUBINUR NEGATIVE 10/08/2011 10:10 PM    KETUA Negative 08/29/2022 09:49 PM    SPECGRAV 1.021 08/29/2022 09:49 PM    BLOODU Negative 08/29/2022 09:49 PM    PHUR 6.0 08/29/2022 09:49 PM    PROTEINU Negative 08/29/2022 09:49 PM    UROBILINOGEN 1.0 08/29/2022 09:49 PM    NITRU POSITIVE 08/29/2022 09:49 PM    LEUKOCYTESUR TRACE 08/29/2022 09:49 PM    LABMICR YES 08/29/2022 09:49 PM    URINETYPE NotGiven 08/29/2022 09:49 PM      Urine Microscopic:   Lab Results   Component Value Date/Time    LABCAST 1-3 Hyaline 10/19/2014 10:57 AM    BACTERIA 4+ 08/29/2022 09:49 PM    COMU see below 04/15/2020 03:55 PM    HYALCAST 0 08/29/2022 09:49 PM    WBCUA 14 08/29/2022 09:49 PM    RBCUA 2 08/29/2022 09:49 PM    EPIU 15 08/29/2022 09:49 PM     Urine Reflex to Culture:   Lab Results   Component Value Date/Time    URRFLXCULT Yes 08/29/2022 09:49 PM     WBC  11.1       MICRO: cultures reviewed and updated by me   Procedure Component Value Units Date/Time   Culture, Urine [[id number]] (Abnormal)  Collected: 08/29/22 2149   Order Status: Completed Specimen: Urine, clean catch Updated: 08/31/22 1352    Organism Escherichia coli ESBL Abnormal     Urine Culture, Routine -- Abnormal     >100,000 CFU/ml   CONTACT PRECAUTIONS INDICATED   Carbapenem antibiotics are the best option for infections caused   by ESBL producing organisms. Other antibiotic classes are   likely to result in treatment failure, even for organisms   demonstrating in vitro susceptibility.     Abnormal    Narrative:     ORDER#: [id number]                          ORDERED BY: AMBER CONKLIN   SOURCE: Urine Clean Catch                  COLLECTED:  08/29/22 21:49   ANTIBIOTICS AT ANTELMO.:                      RECEIVED :  08/29/22 22:22   CALL  [address] tel. [phone], [phone]   Microbiology results called to and read back by SHANA Diaz, 08/31/2022   13:52, by Danitza Ozuna     Susceptibility    Escherichia coli (esbl) (1)    Antibiotic Interpretation Microscan  Method Status    ampicillin Resistant >=32 mcg/mL BACTERIAL SUSCEPTIBILITY PANEL BY LUCY     ampicillin-sulbactam Sensitive 4 mcg/mL BACTERIAL SUSCEPTIBILITY PANEL BY LUCY     ceFAZolin Resistant >=64 mcg/mL BACTERIAL SUSCEPTIBILITY PANEL BY LUCY     cefepime Resistant 16 mcg/mL BACTERIAL SUSCEPTIBILITY PANEL BY LUCY     cefTRIAXone Resistant >=64 mcg/mL BACTERIAL SUSCEPTIBILITY PANEL BY LUCY     ciprofloxacin Resistant >=4 mcg/mL BACTERIAL SUSCEPTIBILITY PANEL BY LUCY     ertapenem Sensitive <=0.12 mcg/mL BACTERIAL SUSCEPTIBILITY PANEL BY LUCY     gentamicin Sensitive <=1 mcg/mL BACTERIAL SUSCEPTIBILITY PANEL BY LUCY     levofloxacin Resistant >=8 mcg/mL BACTERIAL SUSCEPTIBILITY PANEL BY LUCY     nitrofurantoin Sensitive <=16 mcg/mL BACTERIAL SUSCEPTIBILITY PANEL BY LUCY     trimethoprim-sulfamethoxazole Sensitive <=20 mcg/mL BACTERIAL SUSCEPTIBILITY PANEL BY LUCY       Blood Culture:   Lab Results   Component Value Date/Time    BC No Growth after 4 days of incubation. 03/21/2022 11:25 AM    BLOODCULT2 No Growth after 4 days of incubation. 03/21/2022 11:25 AM       Viral Culture:    Lab Results   Component Value Date/Time    COVID19 Not Detected 08/23/2022 04:03 PM     Urine Culture:   Recent Labs     08/29/22 2149   LABURIN >100,000 CFU/ml  CONTACT PRECAUTIONS INDICATED  Carbapenem antibiotics are the best option for infections caused  by ESBL producing organisms. Other antibiotic classes are  likely to result in treatment failure, even for organisms  demonstrating in vitro susceptibility.   *       Scheduled Meds:   phenazopyridine  200 mg Oral TID WC    potassium bicarb-citric acid  40 mEq Oral Once    meropenem  1,000 mg IntraVENous Once    Followed by    meropenem  1,000 mg IntraVENous Q8H    gabapentin  400 mg Oral TID    atorvastatin  10 mg Oral Daily    lisdexamfetamine  50 mg Oral QAM    sodium chloride flush  5-40 mL IntraVENous 2 times per day    enoxaparin  40 mg SubCUTAneous Daily       Continuous Infusions:   sodium chloride 5 mL/hr at 08/31/22 1503    lactated ringers 75 mL/hr at 08/31/22 1503       PRN Meds:  HYDROmorphone, potassium chloride **OR** potassium alternative oral replacement **OR** potassium chloride, hydrOXYzine HCl, promethazine, sodium chloride flush, sodium chloride, ondansetron **OR** ondansetron, polyethylene glycol, acetaminophen **OR** acetaminophen, diphenhydrAMINE, oxyCODONE    Imaging:   No orders to display       All pertinent images and reports for the current Hospitalization were reviewed by me. IMPRESSION:    Patient Active Problem List   Diagnosis    Attention deficit hyperactivity disorder (ADHD)     (ventriculoperitoneal) shunt status    Scoliosis    Prediabetes    Tobacco smoker    Onychomycosis    Congenital hydrocephalus (HCC)    Ureteritis    History of brain shunt    Mood disorder (HCC)    Numbness and tingling in left hand    Diverticulosis    Colitis    UTI (urinary tract infection)    Intractable nausea and vomiting     UTI  Esbl E coli  Recurrent UTI  Kidney stones   shunt  Hydrocephalus  Scoliosis  ADHD  CT abd/pelvis  from  8/28 no obstruction      UA is abnormal but has more epithelial cells will check repeat UA and urine cx        Labs, Microbiology, Radiology and pertinent results from current hospitalization and care every where were reviewed by me as a part of the consultation. PLAN :  Cont IV Meropenem x 1 gm q 8 HRS  Check UA and urine cx  Contact isolation   OPAT d/w pt  in case she needs IV abx  CT abd/pelvis no obstruction     Discussed with patient/Family and Nursing   Risk of Complications/Morbidity: High      Illness(es)/ Infection present that pose threat to bodily function. There is potential for severe exacerbation of infection/side effects of treatment. Therapy requires intensive monitoring for antimicrobial agent toxicity. Thanks for allowing me to participate in your patient's care please call me with any questions or concerns.     Dr. Eric Winter MD  [address] [place] [address] Physician  Phone: [phone]   Fax : [phone]

## 2022-08-31 NOTE — PROGRESS NOTES
Patient removed tele monitor. She has redness to L lower quadrant but no new red areas. Will continue to monitor.

## 2022-08-31 NOTE — CARE COORDINATION
INITIAL CASE MANAGEMENT ASSESSMENT    Met with patient to assess possible discharge needs. Explained Case Management role/services. 08/31/22 1139   Service Assessment   Patient Orientation Alert and Oriented;Person;Place;Situation;Self   Cognition Alert   History Provided By Patient   Primary [address] Family Members   Patient's Healthcare Decision Maker is: Patient Declined (Legal Next of Kin Remains as Decision Maker)   PCP Verified by CM Yes   Last Visit to PCP Within last 3 months   Prior Functional Level Independent in ADLs/IADLs   Current Functional Level Independent in ADLs/IADLs   Can patient return to prior living arrangement Yes   Ability to make needs known: Good   Family able to assist with home care needs: Yes   Would you like for me to discuss the discharge plan with any other family members/significant others, and if so, who? No   Financial Resources Medicaid   Social/Functional History   Lives With Family   Type of [address] [facility] One level   [facility] [address] to enter with rails   Entrance Stairs - Number of Steps 8   Entrance Stairs - Rails Right   Bathroom Shower/Tub Tub/Shower unit   Bathroom Toilet Standard   Receives Help From Family   ADL Assistance Independent   Homemaking Assistance Independent   Ambulation Assistance Independent   Transfer Assistance Independent   Active  No   Occupation On disability   Discharge Planning   Type of [facility];Family Members   Current Services Prior To Admission None   Potential Assistance Needed N/A   DME Ordered? No   Potential Assistance Purchasing Medications No   Type of Home Care Services None   Patient expects to be discharged to: House   One/Two Story Residence One story   History of falls?  0   Services At/After Discharge   Services At/After Discharge None     MEDICATIONS:[facility] in [facility], patient would like to use [employer] if there are any new medication. PT/OT RECS:No orders    PLAN/COMMENTS: Patient to return home with family, family can transport at discharge. Denied any needs at discharge. Provided contact information for patient or family to call with any questions. Will follow and assist as needed.     #[phone]  Electronically signed by Cynthia Babb RN on 8/31/2022 at 11:42 AM

## 2022-08-31 NOTE — PROGRESS NOTES
Patient complained of itching at lead sites. Patient removed telemonitor. Offered prn Benadryl early in the shift  and patient states that she does not wish to take anything by mouth at this time and requests IV benadryl. Message sent to MD. Made aware of patient request and that patient is not on tele monitor. Prn phenergan administered. Tolerated well. Able to tolerate small sips of clear liquids. Up to BR with SBA. Voiding sufficient amounts. No diarrhea noted at this time. Medicated for complaints of back pain. Patient agreeable to take p.o benadryl at 0206. So far, no reports of n/v. Portable telemonitor in place. Will monitor.

## 2022-08-31 NOTE — PROGRESS NOTES
No acute changes this shift. Pt continues to complain of back/kidney pain and dysuria. MD aware and ordered pyridium. Patient took one dose and stated, \"It doesn't help,\" and refused her other dose. Dr. Desi Cintron is aware. Pt also requested anxiety medications as she states she, \"gets bad anxiety in the hospital.\" Dr. Desi Cintron notified and atarax was ordered. Pt states she has taken this before and it doesn't help so she refused that Dr. Desi Cintron is aware. Pt isn't showing any symptoms of anxiety. She also refused oral and IV potassium replacement as the oral potassium would make her more nauseous and the IV potassium would burn. She did eat and keep down a grilled cheese sandwich and crackers but states it made her nausea worse. IV phenergan given per order. MD decreased her dose of dilaudid. Pt able to engage in all ADLs independently.  Abdomen WNL

## 2022-08-31 NOTE — CARE COORDINATION
08/31/22 1137   Readmission Assessment   Number of Days since last admission? 1-7 days   Previous Disposition Home with Family   Who is being Interviewed Patient   What was the patient's/caregiver's perception as to why they think they needed to return back to the hospital? Other (Comment)  (continued nausea and vomiting)   Did you visit your Primary Care Physician after you left the hospital, before you returned this time? No   Why weren't you able to visit your PCP? Did not have an appointment   Did you see a specialist, such as Cardiac, Pulmonary, Orthopedic Physician, etc. after you left the hospital? Yes  (Dr Pressley [facility])   Who advised the patient to return to the hospital? Self-referral   Does the patient report anything that got in the way of taking their medications?  No     #[phone]  Electronically signed by Edison Plaza RN on 8/31/2022 at 11:38 AM

## 2022-08-31 NOTE — PROGRESS NOTES
Hospital Medicine Progress Note     Date:  8/31/2022    PCP: Dilia Duran, APRN - CNP (Tel: [phone])    Date of Admission: 8/29/2022    Chief complaint:   Chief Complaint   Patient presents with    Flank Pain    Emesis       Brief admission history: 42-year-old female with history of ADHD, anxiety and depression, diabetes, ESBL, history of kidney stones, hyperlipidemia, IBS, seizure disorders, obesity with BMI of 32 kg/m², extensive medication allergies, recurrent UTIs, who presents to the emergency room with complaints of nausea, vomiting, dysuria. CT-abdomen/pelvis was unremarkable for acute pathology; demonstrates nonobstructing 4 mm right renal stone. She was diagnosed with acute cystitis. Blood pressure was borderline low at the time of presentation, likely secondary to hypovolemia from GI loss. Assessment/plan:  Acute cystitis without hematuria (without pyelonephritis). Patient was started on Rocephin and Zyvox on admission; continue Rocephin. Blood cultures no growth to date. Urine culture growing E. coli, sensitivity pending. Added Pyridium for dysuria. Patient was initially ordered IV Dilaudid for dysuria. I have decreased the dose from 0.5 mg to 0.25 mg; I do not see indication for IV Dilaudid for management of dysuria. Of note, patient had urine culture on 8/25/2022 that grew Enterococcus faecalis. So far, urine culture is now growing Enterococcus. Nausea and vomiting. Likely secondary to #1. Continue antiemetics. Treat underlying acute cystitis. Increasing oral intake. Decrease intravenous fluid administration. Other comorbidities: history of ADHD, anxiety and depression, diabetes, ESBL, history of kidney stones, hyperlipidemia, IBS, seizure disorders, obesity with BMI of 32 kg/m², extensive medication allergies, recurrent UTIs. Disposition. Okay to discontinue telemetry, transfer to [place] [id number] without telemetry. Possible discharge home tomorrow.     Diet: ADULT DIET; polyethylene glycol, acetaminophen **OR** acetaminophen, diphenhydrAMINE, oxyCODONE    Labs/imaging:  CBC:   Recent Labs     08/29/22 2119 08/31/22  0501   WBC 11.1* 7.0   HGB 14.3 11.7*    237     BMP:    Recent Labs     08/29/22 2119 08/31/22  0501    139   K 3.5 3.5    106   CO2 22 25   BUN 15 9   CREATININE 0.6 0.7   GLUCOSE 103* 121*     Hepatic:   Recent Labs     08/29/22 2119   AST 11*   ALT 12   BILITOT 0.3   ALKPHOS 111       Cynthia Bosworth, MD  -------------------------------  [facility]

## 2022-09-01 ENCOUNTER — APPOINTMENT (OUTPATIENT)
Dept: CT IMAGING | Age: 42
DRG: 463 | End: 2022-09-01
Payer: COMMERCIAL

## 2022-09-01 PROBLEM — Z16.12 INFECTION DUE TO EXTENDED-SPECTRUM BETA-LACTAMASE-PRODUCING ESCHERICHIA COLI: Status: ACTIVE | Noted: 2022-09-01

## 2022-09-01 PROBLEM — N20.0 KIDNEY STONE: Status: ACTIVE | Noted: 2022-09-01

## 2022-09-01 PROBLEM — A49.8 INFECTION DUE TO EXTENDED-SPECTRUM BETA-LACTAMASE-PRODUCING ESCHERICHIA COLI: Status: ACTIVE | Noted: 2022-09-01

## 2022-09-01 PROBLEM — N30.01 ACUTE CYSTITIS WITH HEMATURIA: Status: ACTIVE | Noted: 2022-08-30

## 2022-09-01 LAB
BACTERIA: NORMAL /HPF
BILIRUBIN URINE: NEGATIVE
BLOOD, URINE: NEGATIVE
CLARITY: CLEAR
COLOR: ABNORMAL
EPITHELIAL CELLS, UA: 1 /HPF (ref 0–5)
GLUCOSE URINE: NEGATIVE MG/DL
HYALINE CASTS: 0 /LPF (ref 0–8)
KETONES, URINE: ABNORMAL MG/DL
LEUKOCYTE ESTERASE, URINE: NEGATIVE
LIPASE: 12 U/L (ref 13–60)
MICROSCOPIC EXAMINATION: YES
NITRITE, URINE: POSITIVE
PH UA: 7 (ref 5–8)
PROTEIN UA: NEGATIVE MG/DL
RBC UA: 1 /HPF (ref 0–4)
SPECIFIC GRAVITY UA: 1.02 (ref 1–1.03)
URINE TYPE: ABNORMAL
UROBILINOGEN, URINE: 1 E.U./DL
WBC UA: 1 /HPF (ref 0–5)

## 2022-09-01 PROCEDURE — 2580000003 HC RX 258: Performed by: HOSPITALIST

## 2022-09-01 PROCEDURE — 6360000002 HC RX W HCPCS: Performed by: INTERNAL MEDICINE

## 2022-09-01 PROCEDURE — 6370000000 HC RX 637 (ALT 250 FOR IP): Performed by: HOSPITALIST

## 2022-09-01 PROCEDURE — 87086 URINE CULTURE/COLONY COUNT: CPT

## 2022-09-01 PROCEDURE — 83690 ASSAY OF LIPASE: CPT

## 2022-09-01 PROCEDURE — 74176 CT ABD & PELVIS W/O CONTRAST: CPT

## 2022-09-01 PROCEDURE — 36415 COLL VENOUS BLD VENIPUNCTURE: CPT

## 2022-09-01 PROCEDURE — 2580000003 HC RX 258: Performed by: INTERNAL MEDICINE

## 2022-09-01 PROCEDURE — 6360000002 HC RX W HCPCS: Performed by: HOSPITALIST

## 2022-09-01 PROCEDURE — 1200000000 HC SEMI PRIVATE

## 2022-09-01 PROCEDURE — 81001 URINALYSIS AUTO W/SCOPE: CPT

## 2022-09-01 PROCEDURE — 6370000000 HC RX 637 (ALT 250 FOR IP): Performed by: INTERNAL MEDICINE

## 2022-09-01 RX ORDER — FAMOTIDINE 20 MG/1
20 TABLET, FILM COATED ORAL 2 TIMES DAILY
Status: DISCONTINUED | OUTPATIENT
Start: 2022-09-01 | End: 2022-09-02 | Stop reason: HOSPADM

## 2022-09-01 RX ADMIN — Medication 12.5 MG: at 11:01

## 2022-09-01 RX ADMIN — SODIUM CHLORIDE, PRESERVATIVE FREE 10 ML: 5 INJECTION INTRAVENOUS at 09:39

## 2022-09-01 RX ADMIN — HYDROMORPHONE HYDROCHLORIDE 0.25 MG: 1 INJECTION, SOLUTION INTRAMUSCULAR; INTRAVENOUS; SUBCUTANEOUS at 07:06

## 2022-09-01 RX ADMIN — HYDROMORPHONE HYDROCHLORIDE 0.25 MG: 1 INJECTION, SOLUTION INTRAMUSCULAR; INTRAVENOUS; SUBCUTANEOUS at 20:54

## 2022-09-01 RX ADMIN — ONDANSETRON 4 MG: 4 TABLET, ORALLY DISINTEGRATING ORAL at 06:12

## 2022-09-01 RX ADMIN — HYDROMORPHONE HYDROCHLORIDE 0.25 MG: 1 INJECTION, SOLUTION INTRAMUSCULAR; INTRAVENOUS; SUBCUTANEOUS at 02:34

## 2022-09-01 RX ADMIN — ENOXAPARIN SODIUM 40 MG: 100 INJECTION SUBCUTANEOUS at 09:27

## 2022-09-01 RX ADMIN — MEROPENEM 1000 MG: 1 INJECTION, POWDER, FOR SOLUTION INTRAVENOUS at 06:15

## 2022-09-01 RX ADMIN — SODIUM CHLORIDE, POTASSIUM CHLORIDE, SODIUM LACTATE AND CALCIUM CHLORIDE: 600; 310; 30; 20 INJECTION, SOLUTION INTRAVENOUS at 06:27

## 2022-09-01 RX ADMIN — OXYCODONE 5 MG: 5 TABLET ORAL at 09:59

## 2022-09-01 RX ADMIN — SODIUM CHLORIDE: 9 INJECTION, SOLUTION INTRAVENOUS at 06:14

## 2022-09-01 RX ADMIN — HYDROMORPHONE HYDROCHLORIDE 0.25 MG: 1 INJECTION, SOLUTION INTRAMUSCULAR; INTRAVENOUS; SUBCUTANEOUS at 16:11

## 2022-09-01 RX ADMIN — GABAPENTIN 400 MG: 400 CAPSULE ORAL at 13:28

## 2022-09-01 RX ADMIN — DIPHENHYDRAMINE HCL 25 MG: 25 TABLET ORAL at 16:18

## 2022-09-01 RX ADMIN — MEROPENEM 1000 MG: 1 INJECTION, POWDER, FOR SOLUTION INTRAVENOUS at 16:14

## 2022-09-01 RX ADMIN — GABAPENTIN 400 MG: 400 CAPSULE ORAL at 20:53

## 2022-09-01 RX ADMIN — OXYCODONE 5 MG: 5 TABLET ORAL at 18:15

## 2022-09-01 RX ADMIN — HYDROMORPHONE HYDROCHLORIDE 0.25 MG: 1 INJECTION, SOLUTION INTRAMUSCULAR; INTRAVENOUS; SUBCUTANEOUS at 10:56

## 2022-09-01 ASSESSMENT — PAIN DESCRIPTION - LOCATION
LOCATION: BACK
LOCATION: ABDOMEN
LOCATION: ABDOMEN
LOCATION: BACK
LOCATION: ABDOMEN
LOCATION: ABDOMEN

## 2022-09-01 ASSESSMENT — PAIN DESCRIPTION - DESCRIPTORS
DESCRIPTORS: SHARP
DESCRIPTORS: SHARP
DESCRIPTORS: ACHING

## 2022-09-01 ASSESSMENT — PAIN - FUNCTIONAL ASSESSMENT: PAIN_FUNCTIONAL_ASSESSMENT: ACTIVITIES ARE NOT PREVENTED

## 2022-09-01 ASSESSMENT — PAIN SCALES - GENERAL
PAINLEVEL_OUTOF10: 9
PAINLEVEL_OUTOF10: 0
PAINLEVEL_OUTOF10: 8
PAINLEVEL_OUTOF10: 10
PAINLEVEL_OUTOF10: 8
PAINLEVEL_OUTOF10: 2
PAINLEVEL_OUTOF10: 9

## 2022-09-01 ASSESSMENT — PAIN DESCRIPTION - ORIENTATION
ORIENTATION: RIGHT;UPPER
ORIENTATION: RIGHT;LEFT
ORIENTATION: RIGHT

## 2022-09-01 NOTE — PROGRESS NOTES
Patient expressed concerns regarding decrease in pain medication. Patient became anxious and threatened to leave at one point. Reviewed prn pain medication with patient and also discussed importance of antibiotics for treatment. He became apologetic and stated she wanted to continue with MD plan. Requested a turkey sandwich and consumed 100%. Later complained of nausea. No emesis noted. Prn antiemetic given along with prn pain medication. She is currently resting in bed. Call light in reach.

## 2022-09-01 NOTE — PROGRESS NOTES
Hospital Medicine Progress Note     Date:  9/1/2022    PCP: FREDRICK Harp CNP (Tel: [phone])    Date of Admission: 8/29/2022    Chief complaint:   Chief Complaint   Patient presents with    Flank Pain    Emesis       Brief admission history: 79-year-old female with history of ADHD, anxiety and depression, diabetes, ESBL, history of kidney stones, hyperlipidemia, IBS, seizure disorders, obesity with BMI of 32 kg/m², extensive medication allergies, recurrent UTIs, who presents to the emergency room with complaints of nausea, vomiting, dysuria. CT-abdomen/pelvis was unremarkable for acute pathology; demonstrates nonobstructing 4 mm right renal stone. She was diagnosed with acute cystitis. Blood pressure was borderline low at the time of presentation, likely secondary to hypovolemia from GI loss. Assessment/plan:  Acute cystitis without hematuria (without pyelonephritis). Patient was initially on Rocephin; however, urine culture grew ESBL E. coli and she was changed to meropenem on 8/31/2022. Infectious disease on board, assisting with management. Nausea and vomiting. Likely secondary to #1. Continue antiemetics. Treat underlying acute cystitis. Increasing oral intake. History of anxiety disorder. Continue as needed Atarax as ordered. I have advised patient that I will not be prescribing Valium, despite attempt to get Valium (by claiming that she was on Valium prior to coming to the hospital); later verified is to be false. Diffuse abdominal pain, mostly in upper quadrant. This is not related to underlying acute cystitis. Continue Pepcid. Will obtain CT-abdomen/pelvis, check lipase. Continue current dose of IV Dilaudid, unless there is significant finding noted on CT.   Other comorbidities: history of ADHD, anxiety and depression, diabetes, ESBL, history of kidney stones, hyperlipidemia, IBS, seizure disorders, obesity with BMI of 32 kg/m², extensive medication allergies, recurrent UTIs. Diet: ADULT DIET; Regular    Code status: Full Code   ----------  Subjective  Patient reports diffuse abdominal pain, more in upper quadrants. She is also requested for Valium for anxiety; I have ordered Atarax on 8/31/2022. She further informs me that she was taking Valium prior to admission. She reports that she was prescribed Valium in August 2022. Could not verify on PDMP, at which point patient informed me that she dropped off prescription at pharmacy and has not picked it up. I f called pharmacy, informed that patient does not have prescription for Valium. At that point, patient states that she probably forgot, that her nurse practitioner was going to write her prescription, but probably did not write it because she failed to provide urine sample. Objective  Physical exam:  Vitals: /75   Pulse 66   Temp 98.5 °F (36.9 °C) (Oral)   Resp 16   Ht 4' 11\" (1.499 m)   Wt 159 lb 0.6 oz (72.1 kg)   LMP 10/31/2016 (Exact Date)   SpO2 98%   BMI 32.12 kg/m²   Gen/overall appearance: Not in acute distress. Alert. Oriented x3. Head: Normocephalic, atraumatic  Eyes: EOMI, good acuity  ENT: Oral mucosa moist  Neck: No JVD, thyromegaly  CVS: Nml S1S2, no MRG, RRR  Pulm: Clear bilaterally. No crackles/wheezes  Gastrointestinal: Mild diffuse tenderness to palpation. Soft, ND, +BS  Musculoskeletal: No edema. Warm  Neuro: No focal deficit. Moves extremity spontaneously. Psychiatry: Appropriate affect. Not agitated. Skin: Warm, dry with normal turgor. No rash  Capillary refill: Brisk,< 3 seconds   Peripheral Pulses: +2 palpable, equal bilaterally      24HR INTAKE/OUTPUT:    Intake/Output Summary (Last 24 hours) at 9/1/2022 1158  Last data filed at 8/31/2022 2300  Gross per 24 hour   Intake 2833.7 ml   Output 1600 ml   Net 1233.7 ml       I/O last 3 completed shifts: In: 2863.7 [P.O.:30; I.V.:1975.4; IV Piggyback:858.3]  Out: 0761 [FLNZF:1655]  No intake/output data recorded.   Meds: famotidine  20 mg Oral BID    GI cocktail   Oral Once    phenazopyridine  200 mg Oral TID WC    potassium bicarb-citric acid  40 mEq Oral Once    meropenem  1,000 mg IntraVENous Q8H    gabapentin  400 mg Oral TID    atorvastatin  10 mg Oral Daily    lisdexamfetamine  50 mg Oral QAM    sodium chloride flush  5-40 mL IntraVENous 2 times per day    enoxaparin  40 mg SubCUTAneous Daily     Infusions:    sodium chloride 100 mL/hr at 09/01/22 9365    lactated ringers 75 mL/hr at 09/01/22 0627     PRN Meds: HYDROmorphone, potassium chloride **OR** potassium alternative oral replacement **OR** potassium chloride, hydrOXYzine HCl, promethazine, sodium chloride flush, sodium chloride, ondansetron **OR** ondansetron, polyethylene glycol, acetaminophen **OR** acetaminophen, diphenhydrAMINE, oxyCODONE    Labs/imaging:  CBC:   Recent Labs     08/29/22 2119 08/31/22  0501   WBC 11.1* 7.0   HGB 14.3 11.7*    237       BMP:    Recent Labs     08/29/22 2119 08/31/22  0501    139   K 3.5 3.5    106   CO2 22 25   BUN 15 9   CREATININE 0.6 0.7   GLUCOSE 103* 121*       Hepatic:   Recent Labs     08/29/22 2119   AST 11*   ALT 12   BILITOT 0.3   ALKPHOS 111         Lilli Valentin MD  -------------------------------  Elana hospitalist

## 2022-09-02 VITALS
RESPIRATION RATE: 18 BRPM | OXYGEN SATURATION: 97 % | HEART RATE: 59 BPM | WEIGHT: 158.73 LBS | BODY MASS INDEX: 32 KG/M2 | SYSTOLIC BLOOD PRESSURE: 111 MMHG | TEMPERATURE: 98.3 F | HEIGHT: 59 IN | DIASTOLIC BLOOD PRESSURE: 74 MMHG

## 2022-09-02 LAB
A/G RATIO: 2.2 (ref 1.1–2.2)
ALBUMIN SERPL-MCNC: 3.5 G/DL (ref 3.4–5)
ALP BLD-CCNC: 85 U/L (ref 40–129)
ALT SERPL-CCNC: 9 U/L (ref 10–40)
ANION GAP SERPL CALCULATED.3IONS-SCNC: 10 MMOL/L (ref 3–16)
AST SERPL-CCNC: 8 U/L (ref 15–37)
BASOPHILS ABSOLUTE: 0.1 K/UL (ref 0–0.2)
BASOPHILS RELATIVE PERCENT: 0.7 %
BILIRUB SERPL-MCNC: <0.2 MG/DL (ref 0–1)
BUN BLDV-MCNC: 8 MG/DL (ref 7–20)
CALCIUM SERPL-MCNC: 8.6 MG/DL (ref 8.3–10.6)
CHLORIDE BLD-SCNC: 105 MMOL/L (ref 99–110)
CO2: 25 MMOL/L (ref 21–32)
CREAT SERPL-MCNC: 0.6 MG/DL (ref 0.6–1.1)
EOSINOPHILS ABSOLUTE: 0.2 K/UL (ref 0–0.6)
EOSINOPHILS RELATIVE PERCENT: 2.7 %
GFR AFRICAN AMERICAN: >60
GFR NON-AFRICAN AMERICAN: >60
GLUCOSE BLD-MCNC: 142 MG/DL (ref 70–99)
HCT VFR BLD CALC: 36.4 % (ref 36–48)
HEMOGLOBIN: 12.1 G/DL (ref 12–16)
LYMPHOCYTES ABSOLUTE: 2.7 K/UL (ref 1–5.1)
LYMPHOCYTES RELATIVE PERCENT: 38.9 %
MAGNESIUM: 2.1 MG/DL (ref 1.8–2.4)
MCH RBC QN AUTO: 27.8 PG (ref 26–34)
MCHC RBC AUTO-ENTMCNC: 33.2 G/DL (ref 31–36)
MCV RBC AUTO: 83.8 FL (ref 80–100)
MONOCYTES ABSOLUTE: 0.4 K/UL (ref 0–1.3)
MONOCYTES RELATIVE PERCENT: 6.4 %
NEUTROPHILS ABSOLUTE: 3.6 K/UL (ref 1.7–7.7)
NEUTROPHILS RELATIVE PERCENT: 51.3 %
PDW BLD-RTO: 15.1 % (ref 12.4–15.4)
PHOSPHORUS: 3.8 MG/DL (ref 2.5–4.9)
PLATELET # BLD: 230 K/UL (ref 135–450)
PMV BLD AUTO: 7.7 FL (ref 5–10.5)
POTASSIUM SERPL-SCNC: 3.5 MMOL/L (ref 3.5–5.1)
RBC # BLD: 4.34 M/UL (ref 4–5.2)
SODIUM BLD-SCNC: 140 MMOL/L (ref 136–145)
TOTAL PROTEIN: 5.1 G/DL (ref 6.4–8.2)
URINE CULTURE, ROUTINE: NORMAL
WBC # BLD: 7 K/UL (ref 4–11)

## 2022-09-02 PROCEDURE — 6370000000 HC RX 637 (ALT 250 FOR IP): Performed by: INTERNAL MEDICINE

## 2022-09-02 PROCEDURE — 83735 ASSAY OF MAGNESIUM: CPT

## 2022-09-02 PROCEDURE — 2580000003 HC RX 258: Performed by: INTERNAL MEDICINE

## 2022-09-02 PROCEDURE — 84100 ASSAY OF PHOSPHORUS: CPT

## 2022-09-02 PROCEDURE — 85025 COMPLETE CBC W/AUTO DIFF WBC: CPT

## 2022-09-02 PROCEDURE — 6360000002 HC RX W HCPCS: Performed by: HOSPITALIST

## 2022-09-02 PROCEDURE — 6360000002 HC RX W HCPCS: Performed by: INTERNAL MEDICINE

## 2022-09-02 PROCEDURE — 36415 COLL VENOUS BLD VENIPUNCTURE: CPT

## 2022-09-02 PROCEDURE — 80053 COMPREHEN METABOLIC PANEL: CPT

## 2022-09-02 PROCEDURE — 6370000000 HC RX 637 (ALT 250 FOR IP): Performed by: HOSPITALIST

## 2022-09-02 RX ORDER — POTASSIUM CHLORIDE 20 MEQ/1
40 TABLET, EXTENDED RELEASE ORAL ONCE
Status: COMPLETED | OUTPATIENT
Start: 2022-09-02 | End: 2022-09-02

## 2022-09-02 RX ADMIN — OXYCODONE 5 MG: 5 TABLET ORAL at 02:10

## 2022-09-02 RX ADMIN — ONDANSETRON 4 MG: 2 INJECTION INTRAMUSCULAR; INTRAVENOUS at 09:31

## 2022-09-02 RX ADMIN — MEROPENEM 1000 MG: 1 INJECTION, POWDER, FOR SOLUTION INTRAVENOUS at 06:52

## 2022-09-02 RX ADMIN — HYDROXYZINE HYDROCHLORIDE 25 MG: 25 TABLET, FILM COATED ORAL at 12:18

## 2022-09-02 RX ADMIN — GABAPENTIN 400 MG: 400 CAPSULE ORAL at 12:18

## 2022-09-02 RX ADMIN — HYDROMORPHONE HYDROCHLORIDE 0.25 MG: 1 INJECTION, SOLUTION INTRAMUSCULAR; INTRAVENOUS; SUBCUTANEOUS at 03:25

## 2022-09-02 RX ADMIN — ATORVASTATIN CALCIUM 10 MG: 10 TABLET, FILM COATED ORAL at 08:52

## 2022-09-02 RX ADMIN — OXYCODONE 5 MG: 5 TABLET ORAL at 12:17

## 2022-09-02 RX ADMIN — OXYCODONE 5 MG: 5 TABLET ORAL at 06:53

## 2022-09-02 RX ADMIN — SODIUM CHLORIDE, POTASSIUM CHLORIDE, SODIUM LACTATE AND CALCIUM CHLORIDE: 600; 310; 30; 20 INJECTION, SOLUTION INTRAVENOUS at 03:36

## 2022-09-02 RX ADMIN — GABAPENTIN 400 MG: 400 CAPSULE ORAL at 08:53

## 2022-09-02 RX ADMIN — MEROPENEM 1000 MG: 1 INJECTION, POWDER, FOR SOLUTION INTRAVENOUS at 02:19

## 2022-09-02 RX ADMIN — Medication 12.5 MG: at 02:10

## 2022-09-02 RX ADMIN — POTASSIUM CHLORIDE 40 MEQ: 1500 TABLET, EXTENDED RELEASE ORAL at 08:52

## 2022-09-02 RX ADMIN — ENOXAPARIN SODIUM 40 MG: 100 INJECTION SUBCUTANEOUS at 08:53

## 2022-09-02 ASSESSMENT — PAIN DESCRIPTION - LOCATION
LOCATION: ABDOMEN

## 2022-09-02 ASSESSMENT — PAIN SCALES - GENERAL
PAINLEVEL_OUTOF10: 1
PAINLEVEL_OUTOF10: 0
PAINLEVEL_OUTOF10: 8
PAINLEVEL_OUTOF10: 0
PAINLEVEL_OUTOF10: 7
PAINLEVEL_OUTOF10: 9

## 2022-09-02 ASSESSMENT — PAIN DESCRIPTION - ORIENTATION
ORIENTATION: RIGHT;UPPER

## 2022-09-02 ASSESSMENT — PAIN DESCRIPTION - DESCRIPTORS
DESCRIPTORS: SHARP

## 2022-09-02 ASSESSMENT — PAIN - FUNCTIONAL ASSESSMENT
PAIN_FUNCTIONAL_ASSESSMENT: ACTIVITIES ARE NOT PREVENTED

## 2022-09-02 NOTE — DISCHARGE SUMMARY
intraabdominal pathology. Lipase normal.   Discontinued IV dilaudid on 9/2/2022 - no indication for IV narcotics for treatment of cystitis. She is tolerating oral percocet - ok to continue that for now, in anticipation of going home likely without prescription for narcotics. Drug-seeking behavior. Event from 9/1/2022: Patient informed myself and nursing staff that she was taking valium prior to this admission. I asked again and she maintains that she was taking valium, prescribed by her physician. I told her I could not verify on PDMP, at which point patient informed me that she dropped off prescription at pharmacy and has not picked it up. I called pharmacy to confirm, spoke with pharmacist and I was informed that patient does not have prescription for Valium. When I advised patient that she does not have prescription for valium, she then said \"oh I probably forgot, my nurse practitioner was going to write for the prescription but she didn't because I didn't give her urine sample. \"  Other comorbidities: history of ADHD, anxiety and depression, diabetes, ESBL, history of kidney stones, hyperlipidemia, IBS, seizure disorders, obesity with BMI of 32 kg/m², extensive medication allergies, recurrent UTIs. Invasive procedures:  None. Discharge Diagnoses:   See above. Physical Exam: /74   Pulse 59   Temp 98.3 °F (36.8 °C) (Oral)   Resp 18   Ht 4' 11\" (1.499 m)   Wt 158 lb 11.7 oz (72 kg)   LMP 10/31/2016 (Exact Date)   SpO2 97%   BMI 32.06 kg/m²   Gen/overall appearance: Not in acute distress. Alert. Oriented X3  Head: Normocephalic, atraumatic  Eyes: EOMI, good acuity  ENT: Oral mucosa moist  Neck: No JVD, thyromegaly  CVS: Nml S1S2, no MRG, RRR  Pulm: Clear bilaterally. No crackles/wheezes  Gastrointestinal: Mild diffuse tenderness to palpation. No rebound or guarding. Soft, ND, +BS  Musculoskeletal: No edema. Warm  Neuro: No focal deficit. Moves extremity spontaneously.   Psychiatry: Appropriate affect. Not agitated. Skin: Warm, dry with normal turgor. No rash  Capillary refill: Brisk,< 3 seconds   Peripheral Pulses: +2 palpable, equal bilaterally     Significant diagnostic studies that may require follow up:  CT ABDOMEN PELVIS WO CONTRAST Additional Contrast? None    Result Date: 9/1/2022  EXAMINATION: CT OF THE ABDOMEN AND PELVIS WITHOUT CONTRAST 9/1/2022 2:08 pm TECHNIQUE: CT of the abdomen and pelvis was performed without the administration of intravenous contrast. Multiplanar reformatted images are provided for review. Automated exposure control, iterative reconstruction, and/or weight based adjustment of the mA/kV was utilized to reduce the radiation dose to as low as reasonably achievable. COMPARISON: None. HISTORY: ORDERING SYSTEM PROVIDED HISTORY: diffuse abdominal pain TECHNOLOGIST PROVIDED HISTORY: Reason for exam:->diffuse abdominal pain Additional Contrast?->None Is the patient pregnant?->No Reason for Exam: rt flank pain, Additional signs and symptoms: appy, cher ,hyster, FINDINGS: Lower Chest: Mild bibasilar atelectasis. Organs: Liver: Normal appearance of the liver. Gallbladder: The gallbladder is surgically absent. Spleen: Unremarkable spleen. Pancreas: No peripancreatic inflammatory changes. Adrenal Glands: No focal adrenal abnormalities identified. Kidneys: No hydronephrosis. Small 2-3 mm stone in the right kidney and additional punctate stone in the left kidney. GI/Bowel: Stomach: The stomach is nondistended. Small bowel: No evidence of small bowel obstruction. Colon: Colonic diverticulosis without acute diverticulitis. Appendix: Appendix not visualized, but there are no secondary findings of acute appendicitis. Pelvis: No free fluid in the pelvis. Unremarkable pelvic organs. Urinary bladder is distended. Peritoneum/Retroperitoneum: Normal caliber abdominal aorta. No retroperitoneal lymphadenopathy by CT criteria. Catheter projects over the ventral abdominal peritoneum. It terminates in the right mid abdomen. Bones/Soft Tissues: No acute findings within the soft tissues or osseous structures. Gas within the ventral soft tissues, likely from recent injection. No acute intra-abdominal or pelvic abnormality Catheter projects over the right mid abdomen. Correlate clinically for  shunt catheter versus hemodialysis catheter. No significant fluid in the abdomen or pelvis. Colonic diverticulosis without acute diverticulitis. Nonobstructing nephrolithiasis. CT ABDOMEN PELVIS WO CONTRAST    Result Date: 8/27/2022  Site: [facility] #: [id number] #: [id number] #: [de-identified] #: [id number] #: [id number]: CT ABDOMEN PELVIS WO CONTRASTExam Date/Time: 08/27/2022 04:15 PMAdmitting Diagnosis: Flank pain, kidney stone  suspectedReason for Exam: Flank pain, kidney stone suspected Dictated by: Bárbara Obrien CANDE: 08/27/2022 05:37 PMT: This document is confidential medical information. Unauthorized disclosure or use of this information is prohibited by law. If you are not the intended recipient of this document, please advise us  by calling immediately [phone]. Impression/Conclusion below HISTORY:   Flank pain, kidney stone suspected Flank pain, kidney stone suspected COMPARISON:  7/22/2022 TECHNIQUE: Noncontrast multiplanar CT images of the abdomen and pelvis NOTE:  If there are questions about the content of this report, please contact [address] [place] [address] radiology by calling [phone] FINDINGS: LOWER CHEST:  Unremarkable LIVER:  Unremarkable GALLBLADDER/BILE DUCTS:  Unremarkable. No opaque gallstones PANCREAS:  Unremarkable. No mass or duct dilation SPLEEN:  Unremarkable ADRENALS:  Unremarkable  KIDNEYS/URETERS:  4 mm nonobstructing right renal stone. No obstructing calculi or hydronephrosis identified. GI TRACT:  Bowel normal in caliber and wall thickness. Diverticulosis left side of colon VESSELS:  Unremarkable.   No aneurysm LYMPH NODES: Unremarkable. No enlarged lymph nodes ABD WALL:  Small fluid gas collection in the subcutaneous Tissues below the umbilicus. Could be related to previous injection. Catheter seen in the right  upper abdomen probably ventricular drainage catheter. No associated fluid collection. PELVIS:  Guzman catheter in the bladder. BONES:  Unremarkable OTHER:  None IMPRESSION: No acute CT abnormality. Nonobstructing 4 mm right renal stone. SIGNED BY: Evangelina Holliday. MD Andrzej on 8/27/2022  5:33 PM    [facility] [phone] -  [facility]: [phone]        CT ABDOMEN PELVIS WO CONTRAST Additional Contrast? None    Result Date: 8/19/2022  EXAMINATION: CT OF THE ABDOMEN AND PELVIS WITHOUT CONTRAST 8/19/2022 12:15 pm TECHNIQUE: CT of the abdomen and pelvis was performed without the administration of intravenous contrast. Multiplanar reformatted images are provided for review. Automated exposure control, iterative reconstruction, and/or weight based adjustment of the mA/kV was utilized to reduce the radiation dose to as low as reasonably achievable. COMPARISON: 07/11/2022 CT HISTORY: ORDERING SYSTEM PROVIDED HISTORY: Right flank pain TECHNOLOGIST PROVIDED HISTORY: Additional Contrast?->None Reason for exam:->Right flank pain Decision Support Exception - unselect if not a suspected or confirmed emergency medical condition->Emergency Medical Condition (MA) Is the patient pregnant?->No Reason for Exam: Right flank pain, dysuria Additional signs and symptoms: hyst FINDINGS:  System: There is a single 2 mm nonobstructing calculus in the mid right kidney. No hydronephrosis or hydroureter. No stones in the ureter or bladder. The left kidney and ureter are normal. Lower Chest:  The lung bases are clear. The base of the heart is normal. Organs: The gallbladder is absent. The liver, biliary ducts, pancreas and spleen are normal.  Normal adrenal glands. GI/Bowel:  The stomach, duodenum and small bowel are normal.  Minimal diverticular change in the sigmoid colon. No acute inflammation. Pelvis: The bladder is moderately distended. No stones in the lumen. No mucosal abnormality. The uterus is absent. Peritoneum/Retroperitoneum: The aorta tapers normally. No lymph node enlargement. Bones/Soft Tissues: No significant skeletal abnormalities. 1. No evidence of obstructive uropathy. 2. Single punctate nonobstructing calculus in the right kidney noted incidentally. 3. No acute finding otherwise noted in the abdomen or pelvis. Treatments: As above. Discharge Medications:     Medication List        CONTINUE taking these medications      butalbital-acetaminophen-caffeine -40 MG per tablet  Commonly known as: FIORICET, ESGIC     gabapentin 400 MG capsule  Commonly known as: NEURONTIN     oxyCODONE 5 MG immediate release tablet  Commonly known as: ROXICODONE     simvastatin 10 MG tablet  Commonly known as: ZOCOR     Vyvanse 50 MG capsule  Generic drug: lisdexamfetamine  Take 1 capsule by mouth every morning for 30 days. Take 50 mg by mouth every morning. Activity: activity as tolerated  Diet: ADULT DIET; Regular      Disposition: home  Discharged Condition: Stable  Follow Up:   [address] [place] [address]  [address] [place] [id number] [facility]    Schedule an appointment as soon as possible for a visit in 1 week(s)      Code status:  Full Code     Total time spent on discharge, finalizing medications, referrals and arranging outpatient follow up was more than 30 minutes      Thank you Dr. Bin Fragoso, APRN - [facility] for the opportunity to be involved in this patients care.

## 2022-09-02 NOTE — CARE COORDINATION
DISCHARGE SUMMARY     DATE OF DISCHARGE: 9/2/22    DISCHARGE DESTINATION: Home    HOME CARE: No    HEMODIALYSIS: No    TRANSPORTATION: Private Car    NEW DME ORDERED: no    COMMENTS: Discharge to home with cousin. Denies needs. Electronically signed by Lori Chandler RN on 9/2/2022 at 12:03 PM

## 2022-09-02 NOTE — PLAN OF CARE
Problem: Discharge Planning  Goal: Discharge to home or other facility with appropriate resources  9/2/2022 1300 by Carlin Plunkett RN  Outcome: Completed  9/2/2022 0955 by Carlin Plunkett RN  Outcome: Progressing     Problem: Pain  Goal: Verbalizes/displays adequate comfort level or baseline comfort level  9/2/2022 1300 by Carlin Plunkett RN  Outcome: Completed  9/2/2022 0955 by Carlin Plunkett RN  Outcome: Progressing     Problem: Safety - Adult  Goal: Free from fall injury  9/2/2022 1300 by Carlin Plunkett RN  Outcome: Completed  9/2/2022 0955 by Carlin Plunkett RN  Outcome: Progressing  [address] (Taken 9/2/2022 [address] by Aden Yu, RN)  Free From Fall Injury: Instruct family/caregiver on patient safety     Problem: ABCDS Injury Assessment  Goal: Absence of physical injury  9/2/2022 1300 by Carlin Plunkett RN  Outcome: Completed  9/2/2022 0955 by Carlin Plunkett RN  Outcome: Progressing  Flowsheets (Taken 9/2/2022 0334 by Aden Yu, RN)  Absence of Physical Injury: Implement safety measures based on patient assessment

## 2022-09-02 NOTE — PLAN OF CARE
Problem: Discharge Planning  Goal: Discharge to home or other facility with appropriate resources  Outcome: Progressing     Problem: Pain  Goal: Verbalizes/displays adequate comfort level or baseline comfort level  Outcome: Progressing     Problem: Safety - Adult  Goal: Free from fall injury  Outcome: Progressing  Flowsheets (Taken 9/2/2022 0334 by Tavo Rider, RN)  Free From Fall Injury: Instruct family/caregiver on patient safety     Problem: ABCDS Injury Assessment  Goal: Absence of physical injury  Outcome: Progressing  Flowsheets (Taken 9/2/2022 0334 by Tavo Rider, RN)  Absence of Physical Injury: Implement safety measures based on patient assessment

## 2022-09-02 NOTE — PROGRESS NOTES
Hospital Medicine Progress Note     Date:  9/2/2022    PCP: Dilia Duran, FREDRICK Loza CNP (Tel: [phone])    Date of Admission: 8/29/2022    Chief complaint:   Chief Complaint   Patient presents with    Flank Pain    Emesis       Brief admission history: 82-year-old female with history of ADHD, anxiety and depression, diabetes, ESBL, history of kidney stones, hyperlipidemia, IBS, seizure disorders, obesity with BMI of 32 kg/m², extensive medication allergies, recurrent UTIs, who presents to the emergency room with complaints of nausea, vomiting, dysuria. CT-abdomen/pelvis was unremarkable for acute pathology; demonstrates nonobstructing 4 mm right renal stone. She was diagnosed with acute cystitis. Blood pressure was borderline low at the time of presentation, likely secondary to hypovolemia from GI loss. Assessment/plan:  Acute cystitis without hematuria (without pyelonephritis), ruled out. Patient was initially on Rocephin; however, urine culture grew ESBL E. coli and she was changed to meropenem on 8/31/2022. Infectious disease consulted. Initial urinalysis was a poor sample collection. Urinalysis was repeated and UA contained no WBCs, culture did not grow any organism in over 18 hours. Per discussion with ID, patient likely does not have acute cystitis as repeat UA was unremarkable and culture NGTD. It was decided that it is okay to discharge without antibiotics. Nausea and vomiting, much improved. Patient has maintained increased oral intake and tolerating diet during hospital stay. History of anxiety disorder. Treated with atarax during hospital stay. Patient not satisfied because she could not get valium. Follow up with PCP or psychiatrist to get benzo, if deemed indicated. Diffuse abdominal pain. This is not related to underlying acute cystitis. Multiple CT-abd/pelvis this admission with no acute intraabdominal pathology.  Lipase normal.   Discontinued IV dilaudid on 9/2/2022 - no indication for IV narcotics for treatment of cystitis. She is tolerating oral percocet - ok to continue that for now, in anticipation of going home likely without prescription for narcotics. Drug-seeking behavior. Event from 9/1/2022: Patient informed myself and nursing staff that she was taking valium prior to this admission. I asked again and she maintains that she was taking valium, prescribed by her physician. I told her I could not verify on PDMP, at which point patient informed me that she dropped off prescription at pharmacy and has not picked it up. I called pharmacy to confirm, spoke with pharmacist and I was informed that patient does not have prescription for Valium. When I advised patient that she does not have prescription for valium, she then said \"oh I probably forgot, my nurse practitioner was going to write for the prescription but she didn't because I didn't give her urine sample. \"  Other comorbidities: history of ADHD, anxiety and depression, diabetes, ESBL, history of kidney stones, hyperlipidemia, IBS, seizure disorders, obesity with BMI of 32 kg/m², extensive medication allergies, recurrent UTIs. Diet: ADULT DIET; Regular    Code status: Full Code   ----------  Subjective  Patient maintains she would like to be discharged if she does not get valium for anxiety. Objective  Physical exam:  Vitals: /74   Pulse 59   Temp 98.3 °F (36.8 °C) (Oral)   Resp 18   Ht 4' 11\" (1.499 m)   Wt 158 lb 11.7 oz (72 kg)   LMP 10/31/2016 (Exact Date)   SpO2 97%   BMI 32.06 kg/m²   Gen/overall appearance: Not in acute distress. Alert. Oriented x3. Head: Normocephalic, atraumatic  Eyes: EOMI, good acuity  ENT: Oral mucosa moist  Neck: No JVD, thyromegaly  CVS: Nml S1S2, no MRG, RRR  Pulm: Clear bilaterally. No crackles/wheezes  Gastrointestinal: Mild diffuse tenderness to palpation. Soft, ND, +BS  Musculoskeletal: No edema. Warm  Neuro: No focal deficit.  Moves extremity spontaneously. Psychiatry: Appropriate affect. Not agitated. Skin: Warm, dry with normal turgor. No rash  Capillary refill: Brisk,< 3 seconds   Peripheral Pulses: +2 palpable, equal bilaterally      24HR INTAKE/OUTPUT:    Intake/Output Summary (Last 24 hours) at 9/2/2022 0839  Last data filed at 9/1/2022 1826  Gross per 24 hour   Intake 480 ml   Output --   Net 480 ml       I/O last 3 completed shifts: In: 480 [P.O.:480]  Out: 500 [Urine:500]  No intake/output data recorded.   Meds:    potassium chloride  40 mEq Oral Once    famotidine  20 mg Oral BID    GI cocktail   Oral Once    phenazopyridine  200 mg Oral TID WC    potassium bicarb-citric acid  40 mEq Oral Once    meropenem  1,000 mg IntraVENous Q8H    gabapentin  400 mg Oral TID    atorvastatin  10 mg Oral Daily    lisdexamfetamine  50 mg Oral QAM    sodium chloride flush  5-40 mL IntraVENous 2 times per day    enoxaparin  40 mg SubCUTAneous Daily     Infusions:    sodium chloride 100 mL/hr at 09/01/22 7581    lactated ringers 75 mL/hr at 09/02/22 0336     PRN Meds: potassium chloride **OR** potassium alternative oral replacement **OR** potassium chloride, hydrOXYzine HCl, promethazine, sodium chloride flush, sodium chloride, ondansetron **OR** ondansetron, polyethylene glycol, acetaminophen **OR** acetaminophen, diphenhydrAMINE, oxyCODONE    Labs/imaging:  CBC:   Recent Labs     08/31/22  0501 09/02/22  0719   WBC 7.0 7.0   HGB 11.7* 12.1    230       BMP:    Recent Labs     08/31/22  0501 09/02/22  0719    140   K 3.5 3.5    105   CO2 25 25   BUN 9 8   CREATININE 0.7 0.6   GLUCOSE 121* 142*       Hepatic:   Recent Labs     09/02/22  0719   AST 8*   ALT 9*   BILITOT <0.2   ALKPHOS 85         Geovany Plunkett MD  -------------------------------  Rounding hospitalist

## 2022-09-06 ENCOUNTER — TELEPHONE (OUTPATIENT)
Dept: SURGERY | Age: 42
End: 2022-09-06

## 2022-09-08 DIAGNOSIS — F90.9 ATTENTION DEFICIT HYPERACTIVITY DISORDER (ADHD), UNSPECIFIED ADHD TYPE: ICD-10-CM

## 2022-09-08 RX ORDER — LISDEXAMFETAMINE DIMESYLATE 50 MG
50 CAPSULE ORAL EVERY MORNING
Qty: 30 CAPSULE | Refills: 0 | Status: SHIPPED | OUTPATIENT
Start: 2022-09-08 | End: 2022-10-18 | Stop reason: SDUPTHER

## 2022-09-12 NOTE — PROGRESS NOTES
[address] [facility] time ____1215________        Surgery time _____1345_______    Take the following medications with a sip of water: Follow your MD/Surgeons pre-procedure instructions regarding your medications     Do not eat or drink anything after 12:00 midnight prior to your surgery. This includes water chewing gum, mints and ice chips. You may brush your teeth and gargle the morning of your surgery, but do not swallow the water     Please see your family doctor/pediatrician for a history and physical and/or concerning medications. Bring any test results/reports from your physicians office. If you are under the care of a heart doctor or specialist doctor, please be aware that you may be asked to them for clearance    You may be asked to stop blood thinners such as Coumadin, Plavix, Fragmin, Lovenox, etc., or any anti-inflammatories such as:  Aspirin, Ibuprofen, Advil, Naproxen prior to your surgery. We also ask that you stop any OTC medications such as fish oil, vitamin E, glucosamine, garlic, Multivitamins, COQ 10, etc.    We ask that you do not smoke 24 hours prior to surgery  We ask that you do not  drink any alcoholic beverages 24 hours prior to surgery     You must make arrangements for a responsible adult to take you home after your surgery. For your safety you will not be allowed to leave alone or drive yourself home. Your surgery will be cancelled if you do not have a ride home. Also for your safety, it is strongly suggested that someone stay with you the first 24 hours after your surgery. A parent or legal guardian must accompany a child scheduled for surgery and plan to stay at the hospital until the child is discharged. Please do not bring other children with you. For your comfort, please wear simple loose fitting clothing to the hospital.  Please do not bring valuables.     Do not wear any make-up or nail polish on your fingers or toes      For your safety, please do not wear any jewelry or body piercing's on the day of surgery. All jewelry must be removed. If you have dentures, they will be removed before going to operating room. For your convenience, we will provide you with a container. If you wear contact lenses or glasses, they will be removed, please bring a case for them. If you have a living will and a durable power of  for healthcare, please bring in a copy. As part of our patient safety program to minimize surgical site infections, we ask you to do the following:    Please notify your surgeon if you develop any illness between         now and the  day of your surgery. This includes a cough, cold, fever, sore throat, nausea,         or vomiting, and diarrhea, etc.   Please notify your surgeon if you experience dizziness, shortness         of breath or blurred vision between now and the time of your surgery. Do not shave your operative site 96 hours prior to surgery. For face and neck surgery, men may use an electric razor 48 hours   prior to surgery. You may shower the night before surgery or the morning of   your surgery with an antibacterial soap. You will need to bring a photo ID and insurance card    [facility] has an onsite pharmacy, would you like to utilize our pharmacy     If you will be staying overnight and use a C-pap machine, please bring   your C-pap to hospital     Our goal is to provide you with excellent care, therefore, visitors will be limited to two(2) in the room at a time so that we may focus on providing this care for you. Please contact pre-admission testing if you have any further questions. [facility] phone number:  [phone] PAT fax number:  [phone]  Please note these are generalized instructions for all surgical cases, you may be provided with more specific instructions according to your surgery.     C-Difficile admission screening and protocol:       * Admitted with diarrhea? [] YES    [x]  NO     *Prior history of C-Diff. In last 3 months? [] YES    [x]  NO     *Antibiotic use in the past 6-8 weeks? []  NO    [x]  YES                 If yes, which ANTIBIOTIC AND REASON___kidney infection___     *Prior hospitalization or nursing home in the last month? [x]  YES    []  NO        SAFETY FIRST. .call before you fall

## 2022-09-13 ENCOUNTER — ANESTHESIA EVENT (OUTPATIENT)
Dept: OPERATING ROOM | Age: 42
End: 2022-09-13
Payer: COMMERCIAL

## 2022-09-14 ENCOUNTER — HOSPITAL ENCOUNTER (OUTPATIENT)
Age: 42
Setting detail: OUTPATIENT SURGERY
Discharge: HOME | End: 2022-09-14
Attending: SURGERY | Admitting: SURGERY
Payer: COMMERCIAL

## 2022-09-14 ENCOUNTER — ANESTHESIA (OUTPATIENT)
Dept: OPERATING ROOM | Age: 42
End: 2022-09-14
Payer: COMMERCIAL

## 2022-09-14 VITALS
HEART RATE: 80 BPM | RESPIRATION RATE: 12 BRPM | HEIGHT: 59 IN | SYSTOLIC BLOOD PRESSURE: 112 MMHG | TEMPERATURE: 98 F | OXYGEN SATURATION: 96 % | BODY MASS INDEX: 30.24 KG/M2 | WEIGHT: 150 LBS | DIASTOLIC BLOOD PRESSURE: 70 MMHG

## 2022-09-14 DIAGNOSIS — K56.50 INTERMITTENT SMALL BOWEL OBSTRUCTION DUE TO ADHESIONS (HCC): Primary | ICD-10-CM

## 2022-09-14 DIAGNOSIS — K43.6 VENTRAL HERNIA WITH OBSTRUCTION: ICD-10-CM

## 2022-09-14 PROBLEM — R22.2 ABDOMINAL WALL MASS: Status: ACTIVE | Noted: 2022-09-14

## 2022-09-14 LAB
GLUCOSE BLD-MCNC: 139 MG/DL (ref 70–99)
GLUCOSE BLD-MCNC: 154 MG/DL (ref 70–99)
PERFORMED ON: ABNORMAL
PERFORMED ON: ABNORMAL

## 2022-09-14 PROCEDURE — 2709999900 HC NON-CHARGEABLE SUPPLY: Performed by: SURGERY

## 2022-09-14 PROCEDURE — 3700000000 HC ANESTHESIA ATTENDED CARE: Performed by: SURGERY

## 2022-09-14 PROCEDURE — 2500000003 HC RX 250 WO HCPCS: Performed by: SURGERY

## 2022-09-14 PROCEDURE — 88307 TISSUE EXAM BY PATHOLOGIST: CPT

## 2022-09-14 PROCEDURE — 58660 LAPAROSCOPY LYSIS: CPT | Performed by: SURGERY

## 2022-09-14 PROCEDURE — 3700000001 HC ADD 15 MINUTES (ANESTHESIA): Performed by: SURGERY

## 2022-09-14 PROCEDURE — 2500000003 HC RX 250 WO HCPCS: Performed by: NURSE ANESTHETIST, CERTIFIED REGISTERED

## 2022-09-14 PROCEDURE — 2580000003 HC RX 258: Performed by: ANESTHESIOLOGY

## 2022-09-14 PROCEDURE — 7100000010 HC PHASE II RECOVERY - FIRST 15 MIN: Performed by: SURGERY

## 2022-09-14 PROCEDURE — 3600000004 HC SURGERY LEVEL 4 BASE: Performed by: SURGERY

## 2022-09-14 PROCEDURE — 3600000014 HC SURGERY LEVEL 4 ADDTL 15MIN: Performed by: SURGERY

## 2022-09-14 PROCEDURE — 6360000002 HC RX W HCPCS: Performed by: NURSE ANESTHETIST, CERTIFIED REGISTERED

## 2022-09-14 PROCEDURE — 22902 EXC ABD LES SC < 3 CM: CPT | Performed by: SURGERY

## 2022-09-14 PROCEDURE — 7100000000 HC PACU RECOVERY - FIRST 15 MIN: Performed by: SURGERY

## 2022-09-14 PROCEDURE — A4217 STERILE WATER/SALINE, 500 ML: HCPCS | Performed by: SURGERY

## 2022-09-14 PROCEDURE — 2580000003 HC RX 258: Performed by: SURGERY

## 2022-09-14 PROCEDURE — 7100000011 HC PHASE II RECOVERY - ADDTL 15 MIN: Performed by: SURGERY

## 2022-09-14 PROCEDURE — 6360000002 HC RX W HCPCS: Performed by: ANESTHESIOLOGY

## 2022-09-14 PROCEDURE — 7100000001 HC PACU RECOVERY - ADDTL 15 MIN: Performed by: SURGERY

## 2022-09-14 RX ORDER — HYDROCODONE BITARTRATE AND ACETAMINOPHEN 5; 325 MG/1; MG/1
1 TABLET ORAL
Status: DISCONTINUED | OUTPATIENT
Start: 2022-09-14 | End: 2022-09-14 | Stop reason: HOSPADM

## 2022-09-14 RX ORDER — BUPIVACAINE HYDROCHLORIDE 5 MG/ML
INJECTION, SOLUTION EPIDURAL; INTRACAUDAL
Status: COMPLETED | OUTPATIENT
Start: 2022-09-14 | End: 2022-09-14

## 2022-09-14 RX ORDER — DIPHENHYDRAMINE HYDROCHLORIDE 50 MG/ML
12.5 INJECTION INTRAMUSCULAR; INTRAVENOUS
Status: DISCONTINUED | OUTPATIENT
Start: 2022-09-14 | End: 2022-09-14 | Stop reason: HOSPADM

## 2022-09-14 RX ORDER — SODIUM CHLORIDE 0.9 % (FLUSH) 0.9 %
5-40 SYRINGE (ML) INJECTION EVERY 12 HOURS SCHEDULED
Status: DISCONTINUED | OUTPATIENT
Start: 2022-09-14 | End: 2022-09-14 | Stop reason: HOSPADM

## 2022-09-14 RX ORDER — SODIUM CHLORIDE 0.9 % (FLUSH) 0.9 %
5-40 SYRINGE (ML) INJECTION PRN
Status: DISCONTINUED | OUTPATIENT
Start: 2022-09-14 | End: 2022-09-14 | Stop reason: HOSPADM

## 2022-09-14 RX ORDER — ONDANSETRON 2 MG/ML
INJECTION INTRAMUSCULAR; INTRAVENOUS PRN
Status: DISCONTINUED | OUTPATIENT
Start: 2022-09-14 | End: 2022-09-14 | Stop reason: SDUPTHER

## 2022-09-14 RX ORDER — GLYCOPYRROLATE 0.2 MG/ML
INJECTION INTRAMUSCULAR; INTRAVENOUS PRN
Status: DISCONTINUED | OUTPATIENT
Start: 2022-09-14 | End: 2022-09-14 | Stop reason: SDUPTHER

## 2022-09-14 RX ORDER — MEPERIDINE HYDROCHLORIDE 25 MG/ML
12.5 INJECTION INTRAMUSCULAR; INTRAVENOUS; SUBCUTANEOUS
Status: DISCONTINUED | OUTPATIENT
Start: 2022-09-14 | End: 2022-09-14 | Stop reason: HOSPADM

## 2022-09-14 RX ORDER — FENTANYL CITRATE 50 UG/ML
INJECTION, SOLUTION INTRAMUSCULAR; INTRAVENOUS PRN
Status: DISCONTINUED | OUTPATIENT
Start: 2022-09-14 | End: 2022-09-14 | Stop reason: SDUPTHER

## 2022-09-14 RX ORDER — OXYCODONE HYDROCHLORIDE 5 MG/1
5 TABLET ORAL
Status: DISCONTINUED | OUTPATIENT
Start: 2022-09-14 | End: 2022-09-14 | Stop reason: ALTCHOICE

## 2022-09-14 RX ORDER — ROCURONIUM BROMIDE 10 MG/ML
INJECTION, SOLUTION INTRAVENOUS PRN
Status: DISCONTINUED | OUTPATIENT
Start: 2022-09-14 | End: 2022-09-14 | Stop reason: SDUPTHER

## 2022-09-14 RX ORDER — SODIUM CHLORIDE 9 MG/ML
INJECTION, SOLUTION INTRAVENOUS PRN
Status: DISCONTINUED | OUTPATIENT
Start: 2022-09-14 | End: 2022-09-14 | Stop reason: HOSPADM

## 2022-09-14 RX ORDER — ONDANSETRON 2 MG/ML
4 INJECTION INTRAMUSCULAR; INTRAVENOUS
Status: DISCONTINUED | OUTPATIENT
Start: 2022-09-14 | End: 2022-09-14 | Stop reason: HOSPADM

## 2022-09-14 RX ORDER — MAGNESIUM HYDROXIDE 1200 MG/15ML
LIQUID ORAL CONTINUOUS PRN
Status: COMPLETED | OUTPATIENT
Start: 2022-09-14 | End: 2022-09-14

## 2022-09-14 RX ORDER — LIDOCAINE HYDROCHLORIDE 20 MG/ML
INJECTION, SOLUTION EPIDURAL; INFILTRATION; INTRACAUDAL; PERINEURAL PRN
Status: DISCONTINUED | OUTPATIENT
Start: 2022-09-14 | End: 2022-09-14 | Stop reason: SDUPTHER

## 2022-09-14 RX ORDER — FENTANYL CITRATE 50 UG/ML
50 INJECTION, SOLUTION INTRAMUSCULAR; INTRAVENOUS EVERY 5 MIN PRN
Status: DISCONTINUED | OUTPATIENT
Start: 2022-09-14 | End: 2022-09-14 | Stop reason: HOSPADM

## 2022-09-14 RX ORDER — LORAZEPAM 2 MG/ML
0.5 INJECTION INTRAMUSCULAR
Status: DISCONTINUED | OUTPATIENT
Start: 2022-09-14 | End: 2022-09-14 | Stop reason: HOSPADM

## 2022-09-14 RX ORDER — HYDROCODONE BITARTRATE AND ACETAMINOPHEN 5; 325 MG/1; MG/1
1 TABLET ORAL EVERY 6 HOURS PRN
Qty: 28 TABLET | Refills: 0 | Status: SHIPPED | OUTPATIENT
Start: 2022-09-14 | End: 2022-09-21

## 2022-09-14 RX ORDER — DEXAMETHASONE SODIUM PHOSPHATE 4 MG/ML
INJECTION, SOLUTION INTRA-ARTICULAR; INTRALESIONAL; INTRAMUSCULAR; INTRAVENOUS; SOFT TISSUE PRN
Status: DISCONTINUED | OUTPATIENT
Start: 2022-09-14 | End: 2022-09-14 | Stop reason: SDUPTHER

## 2022-09-14 RX ORDER — PROPOFOL 10 MG/ML
INJECTION, EMULSION INTRAVENOUS PRN
Status: DISCONTINUED | OUTPATIENT
Start: 2022-09-14 | End: 2022-09-14 | Stop reason: SDUPTHER

## 2022-09-14 RX ORDER — MIDAZOLAM HYDROCHLORIDE 1 MG/ML
INJECTION INTRAMUSCULAR; INTRAVENOUS PRN
Status: DISCONTINUED | OUTPATIENT
Start: 2022-09-14 | End: 2022-09-14 | Stop reason: SDUPTHER

## 2022-09-14 RX ADMIN — HYDROMORPHONE HYDROCHLORIDE 0.25 MG: 1 INJECTION, SOLUTION INTRAMUSCULAR; INTRAVENOUS; SUBCUTANEOUS at 14:31

## 2022-09-14 RX ADMIN — HYDROMORPHONE HYDROCHLORIDE 0.5 MG: 1 INJECTION, SOLUTION INTRAMUSCULAR; INTRAVENOUS; SUBCUTANEOUS at 14:20

## 2022-09-14 RX ADMIN — FENTANYL CITRATE 50 MCG: 50 INJECTION INTRAMUSCULAR; INTRAVENOUS at 13:54

## 2022-09-14 RX ADMIN — MIDAZOLAM 2 MG: 1 INJECTION INTRAMUSCULAR; INTRAVENOUS at 13:54

## 2022-09-14 RX ADMIN — HYDROMORPHONE HYDROCHLORIDE 0.25 MG: 1 INJECTION, SOLUTION INTRAMUSCULAR; INTRAVENOUS; SUBCUTANEOUS at 14:53

## 2022-09-14 RX ADMIN — FENTANYL CITRATE 50 MCG: 50 INJECTION, SOLUTION INTRAMUSCULAR; INTRAVENOUS at 15:55

## 2022-09-14 RX ADMIN — FENTANYL CITRATE 50 MCG: 50 INJECTION, SOLUTION INTRAMUSCULAR; INTRAVENOUS at 15:47

## 2022-09-14 RX ADMIN — DEXAMETHASONE SODIUM PHOSPHATE 8 MG: 4 INJECTION, SOLUTION INTRAMUSCULAR; INTRAVENOUS at 14:14

## 2022-09-14 RX ADMIN — ROCURONIUM BROMIDE 40 MG: 10 INJECTION, SOLUTION INTRAVENOUS at 14:00

## 2022-09-14 RX ADMIN — HYDROMORPHONE HYDROCHLORIDE 0.25 MG: 1 INJECTION, SOLUTION INTRAMUSCULAR; INTRAVENOUS; SUBCUTANEOUS at 16:38

## 2022-09-14 RX ADMIN — FENTANYL CITRATE 50 MCG: 50 INJECTION INTRAMUSCULAR; INTRAVENOUS at 14:05

## 2022-09-14 RX ADMIN — SUGAMMADEX 200 MG: 100 INJECTION, SOLUTION INTRAVENOUS at 14:53

## 2022-09-14 RX ADMIN — LIDOCAINE HYDROCHLORIDE 100 MG: 20 INJECTION, SOLUTION EPIDURAL; INFILTRATION; INTRACAUDAL; PERINEURAL at 14:00

## 2022-09-14 RX ADMIN — SODIUM CHLORIDE: 9 INJECTION, SOLUTION INTRAVENOUS at 13:54

## 2022-09-14 RX ADMIN — GLYCOPYRROLATE 0.2 MG: 0.2 INJECTION, SOLUTION INTRAMUSCULAR; INTRAVENOUS at 13:54

## 2022-09-14 RX ADMIN — ONDANSETRON 4 MG: 2 INJECTION INTRAMUSCULAR; INTRAVENOUS at 14:14

## 2022-09-14 RX ADMIN — PROPOFOL 150 MG: 10 INJECTION, EMULSION INTRAVENOUS at 14:00

## 2022-09-14 ASSESSMENT — PAIN SCALES - GENERAL
PAINLEVEL_OUTOF10: 0
PAINLEVEL_OUTOF10: 3
PAINLEVEL_OUTOF10: 10
PAINLEVEL_OUTOF10: 10
PAINLEVEL_OUTOF10: 8
PAINLEVEL_OUTOF10: 0
PAINLEVEL_OUTOF10: 6

## 2022-09-14 ASSESSMENT — PAIN DESCRIPTION - PAIN TYPE
TYPE: SURGICAL PAIN

## 2022-09-14 ASSESSMENT — PAIN - FUNCTIONAL ASSESSMENT
PAIN_FUNCTIONAL_ASSESSMENT: PREVENTS OR INTERFERES SOME ACTIVE ACTIVITIES AND ADLS
PAIN_FUNCTIONAL_ASSESSMENT: 0-10
PAIN_FUNCTIONAL_ASSESSMENT: PREVENTS OR INTERFERES SOME ACTIVE ACTIVITIES AND ADLS

## 2022-09-14 ASSESSMENT — PAIN DESCRIPTION - DESCRIPTORS
DESCRIPTORS: ACHING

## 2022-09-14 ASSESSMENT — PAIN DESCRIPTION - LOCATION
LOCATION: ABDOMEN

## 2022-09-14 ASSESSMENT — PAIN DESCRIPTION - ORIENTATION
ORIENTATION: ANTERIOR
ORIENTATION: ANTERIOR
ORIENTATION: RIGHT;LEFT

## 2022-09-14 ASSESSMENT — PAIN DESCRIPTION - ONSET
ONSET: ON-GOING

## 2022-09-14 ASSESSMENT — PAIN DESCRIPTION - FREQUENCY
FREQUENCY: CONTINUOUS

## 2022-09-14 ASSESSMENT — LIFESTYLE VARIABLES: SMOKING_STATUS: 1

## 2022-09-14 NOTE — PROGRESS NOTES
Patient given prn pain medication see eMAR. Pt reports her pain is a 10/10 but constantly falls asleep after talking to her. Will monitor.

## 2022-09-14 NOTE — ANESTHESIA PRE PROCEDURE
Department of Anesthesiology  Preprocedure Note       Name:  Kendra German   Age:  38 y.o.  :  1980                                          MRN:  [id number]         Date:  2022      Surgeon: Rakesh Smith):  Chryl Habermann, MD    Procedure: Procedure(s):  LAPAROSCOPIC VENTRAL HERNIA REPAIR    Medications prior to admission:   Prior to Admission medications    Medication Sig Start Date End Date Taking? Authorizing Provider   VYVANSE 50 MG capsule Take 1 capsule by mouth every morning for 30 days. Take 50 mg by mouth every morning. Patient taking differently: Take 50 mg by mouth every morning. 9/8/22 10/8/22  FREDRICK Moran - CNP   simvastatin (ZOCOR) 10 MG tablet Take 10 mg by mouth daily    Historical Provider, MD   gabapentin (NEURONTIN) 400 MG capsule Take 400 mg by mouth 3 times daily. Historical Provider, MD   oxyCODONE (ROXICODONE) 5 MG immediate release tablet Take 5 mg by mouth every 6 hours as needed for Pain. Historical Provider, MD       Current medications:    Current Facility-Administered Medications   Medication Dose Route Frequency Provider Last Rate Last Admin    sodium chloride flush 0.9 % injection 5-40 mL  5-40 mL IntraVENous 2 times per day Sorin Wong MD        sodium chloride flush 0.9 % injection 5-40 mL  5-40 mL IntraVENous PRN Sorin Wong MD        0.9 % sodium chloride infusion   IntraVENous PRN Sorin Wong MD           Allergies:     Allergies   Allergen Reactions    Bee Venom Shortness Of Breath and Swelling    Bentyl [Dicyclomine Hcl] Shortness Of Breath and Anxiety    Dicyclomine Anxiety and Shortness Of Breath    Ketorolac Tromethamine Hives and Itching     Injectable only  Tolerates PO NSAIDs fine    Levofloxacin Anxiety and Hives    Maitake Anaphylaxis    Shiitake Mushroom Anaphylaxis     Can not eat mushrooms    Sulfa Antibiotics Shortness Of Breath    Vancomycin Anaphylaxis and Hives    Zosyn [Piperacillin Sod-Tazobactam So] Hives, Shortness Of Breath, Nausea Only and Dizziness or Vertigo    Adhesive Tape Dermatitis     Other reaction(s): Dermatitis    Oxycodone-Acetaminophen Nausea And Vomiting     Tolerates Norco/Vicodin ok  Patient can not tolerate Percocet well. Extreme vomiting      Sulfacetamide Hives    Acetaminophen Anxiety     Patient reports when taking acetaminophen (including Norco/Percocet) she gets severe anxiety when taking this medication.      Butalbital-Apap-Caff-Cod Anxiety    Ceftaroline Rash    Daptomycin Swelling and Rash    Fosfomycin Tromethamine Nausea And Vomiting    Haldol [Haloperidol] Anxiety    Ketamine Nausea And Vomiting and Anxiety    Methocarbamol Rash    Morphine Hives    Nitrofurantoin Nausea And Vomiting     \"projectile vomiting\"    Prochlorperazine Anxiety    Reglan [Metoclopramide] Anxiety    Silicone Hives, Swelling and Rash    Tazobactam Nausea And Vomiting and Anxiety       Problem List:    Patient Active Problem List   Diagnosis Code    Attention deficit hyperactivity disorder (ADHD) F90.9     (ventriculoperitoneal) shunt status Z98.2    Scoliosis M41.9    Prediabetes R73.03    Tobacco smoker Z72.0    Onychomycosis B35.1    Congenital hydrocephalus (HCC) Q03.9    Ureteritis N28.89    Acute cystitis without hematuria N30.00    History of brain shunt Z98.2    Mood disorder (HCC) F39    Numbness and tingling in left hand R20.0, R20.2    Diverticulosis K57.90    Colitis K52.9    UTI (urinary tract infection) N39.0    Infection due to extended-spectrum beta-lactamase-producing Escherichia coli A49.8, Z16.12    Kidney stone N20.0       Past Medical History:        Diagnosis Date    ADHD (attention deficit hyperactivity disorder) 82    Depression with anxiety     Diabetes mellitus [facility] [address]     pre-diabetes    Difficult intubation     airway swelled up    Encounter for imaging to screen for metal prior to MRI 06/01/2021    MRI Conditional Medtronic Non-Programmable shunt model#[id number] implanted 10/30/2020 at [facility]. Normal Mode. 1.5T or 3.0T.    ESBL (extended spectrum beta-lactamase) producing bacteria infection 2019    urine    Functional ovarian cysts 2008    rt ovary cyst x 2 yrs.     Headache(784.0)     migraines    History of blood transfusion     at birth   Rodriguez History of kidney stones     History of PCOS     had hysterectomy in 2016    Hydrocephalus ([facility] [address]     Hyperlipidemia     Irritable bowel syndrome 2004    had colonoscopy about 6 yrs ago    Meningitis     Neutrophilic leukocytosis     Nicotine dependence     PONV (postoperative nausea and vomiting)     very nauseated and sometimes wakes up with a Migraine--happened once after brain surgery    Primary osteoarthritis of left knee 2016    S/P cone biopsy of cervix 2004    Scoliosis .s    Seizures ([address]     twice--last one in 2022     (ventriculoperitoneal) shunt status     hydrocephalus f/w Neurosurgeon at [place] [address] Wears glasses     reading       Past Surgical History:        Procedure Laterality Date    ABDOMEN SURGERY N/A 2021    REMOVAL OF ABDOMINAL WALL MASS performed by Leanna Oliveira MD at [address] [place] [address]      appendicitis     SECTION  2005    placenta previa    CHOLECYSTECTOMY      COLONOSCOPY  2004    COLPOSCOPY  2004    CSF SHUNT      replaced at age 17   Rodriguez CYSTOSCOPY      stone removal    HEMORRHOID SURGERY      HERNIA REPAIR Bilateral     inguinal    HERNIA REPAIR  2015    hiatal hernia    HYSTERECTOMY, TOTAL ABDOMINAL (CERVIX REMOVED)  2016    TAHBSO, adhesions/PCOS    KNEE ARTHROSCOPY Left 2015    medial meniscectomy, chondroplasty, plica resection    LITHOTRIPSY Bilateral 12/10/2019    OTHER SURGICAL HISTORY  10/19/2016    op lap    VENTRICULOPERITONEAL SHUNT      multiple revisions, most recent        Social History:    Social History     Tobacco Use    Smoking status: Every Day POCCL, POCBUN, POCHEMO, POCHCT in the last 72 hours. Coags:   Lab Results   Component Value Date/Time    PROTIME 11.6 03/21/2022 11:25 AM    INR 1.03 03/21/2022 11:25 AM    APTT 35.9 09/02/2019 11:20 AM       HCG (If Applicable):   Lab Results   Component Value Date    PREGTESTUR Negative 08/19/2022        ABGs: No results found for: PHART, PO2ART, YMI8IIC, CXK8LUX, BEART, N5UKWQMP     Type & Screen (If Applicable):  No results found for: LABABO, LABRH    Drug/Infectious Status (If Applicable):  No results found for: HIV, HEPCAB    COVID-19 Screening (If Applicable):   Lab Results   Component Value Date/Time    COVID19 Not Detected 08/23/2022 04:03 PM           Anesthesia Evaluation  Patient summary reviewed   history of anesthetic complications (As child): PONV and history of difficult intubation. Airway: Mallampati: III  TM distance: >3 FB   Neck ROM: full  Mouth opening: > = 3 FB   Dental: normal exam         Pulmonary:   (+) current smoker (1/2 PPD)                           Cardiovascular:    (+) hyperlipidemia    (-) past MI, CABG/stent and  angina                Neuro/Psych:      (-) seizures and CVA            ROS comment: Pt has  shunt GI/Hepatic/Renal:        (-) liver disease and no renal disease       Endo/Other:                     Abdominal:             Vascular: negative vascular ROS. Other Findings:           Anesthesia Plan      general     ASA 3       Induction: intravenous. MIPS: Postoperative opioids intended and Prophylactic antiemetics administered. Anesthetic plan and risks discussed with patient. Plan discussed with CRNA. This pre-anesthesia assessment may be used as a history and physical.    DOS STAFF ADDENDUM:    Pt seen and examined, chart reviewed (including anesthesia, drug and allergy history). No interval changes to history and physical examination. Anesthetic plan, risks, benefits, alternatives, and personnel involved discussed with patient.

## 2022-09-14 NOTE — ANESTHESIA POSTPROCEDURE EVALUATION
Department of Anesthesiology  Postprocedure Note    Patient: Sarah Marina  MRN: [id number]  YOB: 1980  Date of evaluation: 9/14/2022      Procedure Summary     Date: 09/14/22 Room / Location: [address] [place] [address]    Anesthesia Start: 3668 Anesthesia Stop: 5614    Procedure: LAPAROSCOPIC LYSIS OF ADHESIONS AND ABDOMINAL WALL MASS REMOVAL (Abdomen) Diagnosis:       Ventral hernia with obstruction      (VENTRAL HERNIA)    Surgeons: Sabrina Juarez MD Responsible Provider: Kiet Guerrero MD    Anesthesia Type: General ASA Status: 3          Anesthesia Type: General    Harlan Phase I: Harlan Score: 6    Harlan Phase II:        Anesthesia Post Evaluation    Patient location during evaluation: PACU  Patient participation: complete - patient participated  Level of consciousness: awake and alert  Pain score: 3  Airway patency: patent  Nausea & Vomiting: no nausea and no vomiting  Complications: no  Cardiovascular status: blood pressure returned to baseline  Respiratory status: acceptable  Hydration status: euvolemic

## 2022-09-14 NOTE — PROGRESS NOTES
Patient admitted to PACU # 4 from OR at [address] per Dr Sapna Peñaloza  . Attached to PACU monitoring system and report received from anesthesia provider. Patient was reported to be hemodynamically stable during procedure. Patient drowsy on admission and denied pain.

## 2022-09-14 NOTE — PROGRESS NOTES
Assisted up to BR to void. Remains up in wheelchair. Discharge instructions given to patient, and cousin Lexie Vo per phone. Verbalize understanding. Script given.

## 2022-09-14 NOTE — BRIEF OP NOTE
Brief Postoperative Note      Patient: Rey Up  YOB: 1980  MRN: [id number]    Date of Procedure: 9/14/2022    Pre-Op Diagnosis: chronic abdominal pain, possible hernia    Post-Op Diagnosis: Same       Procedure(s):  LAPAROSCOPIC LYSIS OF ADHESIONS AND ABDOMINAL WALL MASS REMOVAL 2 cm    Surgeon(s):  Jak Cheema MD    Assistant:  Surgical Assistant: Parveen Manzano    Anesthesia: General    Estimated Blood Loss (mL): less than 50     Complications: None    Specimens:   ID Type Source Tests Collected by Time Destination   A : A) ABDOMINAL WALL MASS Specimen Abdomen SURGICAL PATHOLOGY Jak Cheema MD 9/14/2022 1456        Implants:  * No implants in log *      Drains:   [REMOVED] Urinary Catheter 08/19/22 Guzman (Removed)   $ Urethral catheter insertion $ Not inserted for procedure 08/19/22 1307   Catheter Indications Urinary retention (acute or chronic), continuous bladder irrigation or bladder outlet obstruction 08/19/22 1307   Site Assessment No urethral drainage 08/19/22 1307   Urine Color Yellow 08/19/22 1307   Urine Appearance Clear 08/19/22 1307   Collection Container Standard 08/19/22 1307   Securement Method Leg strap 08/19/22 1307   Catheter Care Completed Yes 08/19/22 1307   Catheter Best Practices  Drainage tube clipped to bed;Catheter secured to thigh; Tamper seal intact; Bag below bladder;Bag not on floor; Lack of dependent loop in tubing;Drainage bag less than half full 08/19/22 1307   Status Draining 08/19/22 1307   Output (mL) 500 mL 08/19/22 1307       Findings: dense adhesions of small bowel to pelvic wall, 2 cm subcutaneous mass abdominal wall    Electronically signed by Adalberto Birmingham MD on 9/14/2022 at 3:09 PM

## 2022-09-14 NOTE — H&P
PATIENT NAME: Dino Piedra OF BIRTH: 1980    ADMISSION DATE: 9/14/2022 12:12 PM      TODAY'S DATE: 9/14/2022    CHIEF COMPLAINT:  abdominal pain      HISTORY OF PRESENT ILLNESS:  The patient is a 38 y.o. female  who presents with ongoing intermittent lower abdominal pain. No obvious etiology on extensive workup. Possible lower midline hernia vs adhesions have been proposed at possible sources. For laparoscopy today. Lysis of adhesions, possible hernia repair. Past Medical History:        Diagnosis Date    ADHD (attention deficit hyperactivity disorder) 1988    Depression with anxiety     Diabetes mellitus ([address]     pre-diabetes    Difficult intubation     airway swelled up    Encounter for imaging to screen for metal prior to MRI 06/01/2021    MRI Conditional Medtronic Non-Programmable shunt model#[id number] implanted 10/30/2020 at [facility]. Normal Mode. 1.5T or 3.0T. ESBL (extended spectrum beta-lactamase) producing bacteria infection 11/06/2019    urine    Functional ovarian cysts 2008    rt ovary cyst x 2 yrs.     Headache(784.0)     migraines    History of blood transfusion     at birth    History of kidney stones     History of PCOS     had hysterectomy in 2016    Hydrocephalus [facility])     Hyperlipidemia     Irritable bowel syndrome 2004    had colonoscopy about 6 yrs ago    Meningitis 32/8592    Neutrophilic leukocytosis     Nicotine dependence     PONV (postoperative nausea and vomiting)     very nauseated and sometimes wakes up with a Migraine--happened once after brain surgery    Primary osteoarthritis of left knee 07/01/2016    S/P cone biopsy of cervix 2004    Scoliosis 1990.s    Seizures ([address]     twice--last one in June2022     (ventriculoperitoneal) shunt status 1982    hydrocephalus f/w Neurosurgeon at [facility]    Wears glasses     reading       Past Surgical History:        Procedure Laterality Date    ABDOMEN SURGERY N/A 7/14/2021    REMOVAL OF ABDOMINAL WALL MASS performed by [facility]al [facility]  2003    appendicitis     SECTION  2005    placenta previa    CHOLECYSTECTOMY      COLONOSCOPY  2004    COLPOSCOPY      CSF SHUNT      replaced at age 17    CYSTOSCOPY      stone removal    HEMORRHOID SURGERY      HERNIA REPAIR Bilateral     inguinal    HERNIA REPAIR      hiatal hernia    HYSTERECTOMY, TOTAL ABDOMINAL (CERVIX REMOVED)  2016    TAHBSO, adhesions/PCOS    KNEE ARTHROSCOPY Left 2015    medial meniscectomy, chondroplasty, plica resection    LITHOTRIPSY Bilateral 12/10/2019    OTHER SURGICAL HISTORY  10/19/2016    op lap    VENTRICULOPERITONEAL SHUNT      multiple revisions, most recent        Medications Prior to Admission:   Medications Prior to Admission: VYVANSE 50 MG capsule, Take 1 capsule by mouth every morning for 30 days. Take 50 mg by mouth every morning. (Patient taking differently: Take 50 mg by mouth every morning.)  simvastatin (ZOCOR) 10 MG tablet, Take 10 mg by mouth daily  gabapentin (NEURONTIN) 400 MG capsule, Take 400 mg by mouth 3 times daily. oxyCODONE (ROXICODONE) 5 MG immediate release tablet, Take 5 mg by mouth every 6 hours as needed for Pain. Allergies:  Bee venom, Bentyl [dicyclomine hcl], Dicyclomine, Ketorolac tromethamine, Levofloxacin, Maitake, Shiitake mushroom, Sulfa antibiotics, Vancomycin, Zosyn [piperacillin sod-tazobactam so], Adhesive tape, Oxycodone-acetaminophen, Sulfacetamide, Acetaminophen, Butalbital-apap-caff-cod, Ceftaroline, Daptomycin, Fosfomycin tromethamine, Haldol [haloperidol], Ketamine, Methocarbamol, Morphine, Nitrofurantoin, Prochlorperazine, Reglan [metoclopramide], Silicone, and Tazobactam    Social History:   TOBACCO:   reports that she has been smoking cigarettes. She has a 9.00 pack-year smoking history. She has never used smokeless tobacco.  ETOH:   reports no history of alcohol use. DRUGS:   reports no history of drug use.     Family History:       Problem Relation Age of Onset    High Blood Pressure Mother     Diabetes Mother     High Cholesterol Mother     Depression Mother     Diabetes Maternal Grandmother     High Blood Pressure Maternal Grandmother     High Cholesterol Maternal Grandmother     Heart Disease Maternal Grandfather     High Blood Pressure Maternal Grandfather     Heart Disease Paternal Grandmother     Stroke Paternal Grandfather     Depression Sister     Cirrhosis Father     Rheum Arthritis Neg Hx     Osteoarthritis Neg Hx     Asthma Neg Hx     Breast Cancer Neg Hx     Cancer Neg Hx     Heart Failure Neg Hx     Hypertension Neg Hx     Migraines Neg Hx     Ovarian Cancer Neg Hx     Rashes/Skin Problems Neg Hx     Seizures Neg Hx     Thyroid Disease Neg Hx        REVIEW OF SYSTEMS:    CONSTITUTIONAL:  negative  HEENT:  negative  CARDIOVASCULAR:  negative  GASTROINTESTINAL:  positive for abdominal pain  GENITOURINARY:  negative  HEMATOLOGIC/LYMPHATIC:  negative  ENDOCRINE:  negative  All other systems negative    PHYSICAL EXAM:    VITALS:  /77   Pulse (!) 113   Temp 97.2 °F (36.2 °C) (Temporal)   Resp 20   Ht 4' 11\" (1.499 m)   Wt 150 lb (68 kg)   LMP 10/31/2016 (Exact Date)   SpO2 97%   BMI 30.30 kg/m²   INTAKE/OUTPUT:   No intake/output data recorded. No intake/output data recorded. CONSTITUTIONAL:  awake, alert, no apparent distress and mildly obese  ENT:  normocepalic, without obvious abnormality, no thyromegaly or masses  NECK:  supple, symmetrical, trachea midline   LUNGS:  clear to auscultation, no crackles or wheezing  CARDIOVASCULAR:  regular rate and rhythm and no murmur noted  ABDOMEN:  soft, non tender, no masses  LYMPHATIC: no cervical or inguinal adenopathy  MUSCULOSKELETAL:  0+ pitting edema lower extremities, no deformities, no tenderness  NEUROLOGIC:  Mental Status Exam:  Level of Alertness:   awake.     Cranial nerves 2-12 intact  Orientation:   person, place, time      ASSESSMENT AND PLAN:    Chronic abdominal pain  For laparoscopy today, possible lysis of adhesions, possible hernia repair    The risks, benefits and alternatives to the planned procedure were discussed. Patient expressed an understanding and is willing to proceed.     Electronically signed by Adalberto Birmingham MD on 9/14/2022 at 1:05 PM    Adalberto Birmingham MD

## 2022-09-14 NOTE — PROGRESS NOTES
Sleeping at intervals. Awakens easily. VSS. C/brenda 9 of 10 post op abd. [facility]

## 2022-09-15 NOTE — OP NOTE
[facility]           [address]                                OPERATIVE REPORT    PATIENT NAME: Vashti Santamaria                      :        1980  MED REC NO:   [id number]                          ROOM:  ACCOUNT NO:   [de-identified]                           ADMIT DATE: 2022  PROVIDER:     Jose Santana MD    DATE OF PROCEDURE:  2022    PREOPERATIVE DIAGNOSES:  Chronic abdominal pain with possible small  bowel obstruction and possible ventral hernia. POSTOPERATIVE DIAGNOSES: Partial intermittent small bowel obstruction  due to adhesions and abdominal wall mass. PROCEDURES:  1. Laparoscopic lysis of adhesions. 2.  Removal of subcutaneous abdominal wall mass measuring 2 cm. SURGEON:  Jose Santana MD    SPECIMEN:  Abdominal wall mass. ESTIMATED BLOOD LOSS:  Less than 50 mL. COMPLICATIONS:  None. DISPOSITION:  To recovery in stable condition. INDICATION:  The patient is a 30-year-old female with an extensive  surgical history who presents with chronic intermittent abdominal pain. She has undergone an exhaustive workup without an obvious etiology. It  was suggested to her that possibly she had adhesions from prior surgery;  although, there was no evidence of bowel obstruction on any of her  imaging. I was concerned that it could possibly be a lower midline  ventral hernia and given the lack of other etiology and persistent  symptoms, we elected to proceed with a laparoscopy today. It was  explained we would attempt lysis of adhesions and perform hernia repair  if necessary, although, there was no guarantee this would improve her  pain. She understood and is willing to proceed. PROCEDURE:  The patient was brought to the operating room and placed  supine. General anesthesia, intubation were performed and the abdomen  prepped and draped in a sterile fashion.   An infraumbilical incision was  made and a trocar placed with the Patti technique. Insufflation was  established and the camera inserted. There were no obvious abdominal  wall adhesions; however, there did appear to be small bowel tethered to  the pelvic sidewall. Two additional trocars were placed; one in the  right upper quadrant, one in the left lower quadrant. Using a small  bowel grasper and a laparoscopic scissor, adhesiolysis was then  performed freeing the small bowel adhesions away from the pelvic  peritoneum. Also, there were adhesions noted between small bowel loops  and these were also sharply incised until the entire length of the small  bowel had been freed. This process took approximately 45 minutes. We  checked the dissection planes for bleeding and hemostasis was achieved  with cautery on the abdominal wall. There was no need for cautery near  the bowel. The remaining abdomen was inspected and no other  intra-abdominal findings were detected. The patient did have a firm  mass in the subcutaneous tissue of the lower abdomen and I was concerned  this could represent a possible endometrioma versus scar tissue. There  was no extension into the abdomen, so at this point, we completed the  laparoscopic portion of the case to remove the camera and trocars. The  umbilical site was closed with an 0 Vicryl, and the fascia and the skin  incisions were all closed with 4-0 Vicryl. Dressings were applied and  then we made a transverse incision across the palpable mass in the lower  midline. Dissection was carried into the subcutaneous tissue and a  fibrous encapsulated mass was excised. It appeared to contain old clot  and could represent an old hematoma or possible endometrioma. It  measured 2 cm on initial dissection. Hemostasis was achieved and that  wound also closed with 4-0 Vicryl. Dressings were applied and the  patient transferred to recovery in good condition.         Agnes Clifton MD    D: 09/14/2022 16:38:51 T: 09/14/2022 16:42:28     [id number]  Job#: [id number]     Doc#: [id number]    CC:

## 2022-09-19 ENCOUNTER — TELEPHONE (OUTPATIENT)
Dept: SURGERY | Age: 42
End: 2022-09-19

## 2022-09-19 NOTE — TELEPHONE ENCOUNTER
PT had surgery on 9/16 and is having some sharp stomach pains and pain medication is not working. Also be running a fever for the last 3 days. Has gotten up to 103 degrees. It is 102 degrees.   Call and advise what PT should do

## 2022-09-19 NOTE — TELEPHONE ENCOUNTER
Pre MRJ take tylenol. She cannot take tylenol. She took motrin. Pt called physician on call over the weekend and was told sometime your body will develop a fever following surgery.

## 2022-09-21 ENCOUNTER — HOSPITAL ENCOUNTER (EMERGENCY)
Age: 42
Discharge: HOME | End: 2022-09-21
Attending: EMERGENCY MEDICINE
Payer: COMMERCIAL

## 2022-09-21 ENCOUNTER — APPOINTMENT (OUTPATIENT)
Dept: CT IMAGING | Age: 42
End: 2022-09-21
Payer: COMMERCIAL

## 2022-09-21 VITALS
TEMPERATURE: 98.3 F | RESPIRATION RATE: 14 BRPM | BODY MASS INDEX: 30.24 KG/M2 | WEIGHT: 150 LBS | SYSTOLIC BLOOD PRESSURE: 105 MMHG | HEART RATE: 70 BPM | HEIGHT: 59 IN | OXYGEN SATURATION: 98 % | DIASTOLIC BLOOD PRESSURE: 91 MMHG

## 2022-09-21 DIAGNOSIS — G89.18 ACUTE POST-OPERATIVE PAIN: ICD-10-CM

## 2022-09-21 DIAGNOSIS — U07.1 COVID-19: Primary | ICD-10-CM

## 2022-09-21 LAB
A/G RATIO: 1.1 (ref 1.1–2.2)
ALBUMIN SERPL-MCNC: 3.8 G/DL (ref 3.4–5)
ALP BLD-CCNC: 227 U/L (ref 40–129)
ALT SERPL-CCNC: 84 U/L (ref 10–40)
ANION GAP SERPL CALCULATED.3IONS-SCNC: 13 MMOL/L (ref 3–16)
AST SERPL-CCNC: 24 U/L (ref 15–37)
BACTERIA: ABNORMAL /HPF
BASOPHILS ABSOLUTE: 0 K/UL (ref 0–0.2)
BASOPHILS RELATIVE PERCENT: 0.3 %
BILIRUB SERPL-MCNC: 0.5 MG/DL (ref 0–1)
BILIRUBIN URINE: NEGATIVE
BLOOD, URINE: NEGATIVE
BUN BLDV-MCNC: 11 MG/DL (ref 7–20)
CALCIUM SERPL-MCNC: 9.5 MG/DL (ref 8.3–10.6)
CHLORIDE BLD-SCNC: 105 MMOL/L (ref 99–110)
CLARITY: ABNORMAL
CO2: 23 MMOL/L (ref 21–32)
COLOR: YELLOW
COMMENT UA: ABNORMAL
CREAT SERPL-MCNC: 0.6 MG/DL (ref 0.6–1.1)
EOSINOPHILS ABSOLUTE: 0.1 K/UL (ref 0–0.6)
EOSINOPHILS RELATIVE PERCENT: 1.1 %
EPITHELIAL CELLS, UA: 19 /HPF (ref 0–5)
GFR AFRICAN AMERICAN: >60
GFR NON-AFRICAN AMERICAN: >60
GLUCOSE BLD-MCNC: 149 MG/DL (ref 70–99)
GLUCOSE URINE: NEGATIVE MG/DL
HCT VFR BLD CALC: 43.3 % (ref 36–48)
HEMOGLOBIN: 14.7 G/DL (ref 12–16)
HYALINE CASTS: 2 /LPF (ref 0–8)
KETONES, URINE: NEGATIVE MG/DL
LACTIC ACID: 1.3 MMOL/L (ref 0.4–2)
LEUKOCYTE ESTERASE, URINE: NEGATIVE
LIPASE: 11 U/L (ref 13–60)
LYMPHOCYTES ABSOLUTE: 1.4 K/UL (ref 1–5.1)
LYMPHOCYTES RELATIVE PERCENT: 22.5 %
MCH RBC QN AUTO: 28.2 PG (ref 26–34)
MCHC RBC AUTO-ENTMCNC: 34 G/DL (ref 31–36)
MCV RBC AUTO: 83 FL (ref 80–100)
MICROSCOPIC EXAMINATION: YES
MONOCYTES ABSOLUTE: 0.4 K/UL (ref 0–1.3)
MONOCYTES RELATIVE PERCENT: 5.8 %
NEUTROPHILS ABSOLUTE: 4.4 K/UL (ref 1.7–7.7)
NEUTROPHILS RELATIVE PERCENT: 70.3 %
NITRITE, URINE: NEGATIVE
PDW BLD-RTO: 16.1 % (ref 12.4–15.4)
PH UA: 6.5 (ref 5–8)
PLATELET # BLD: 199 K/UL (ref 135–450)
PMV BLD AUTO: 7.8 FL (ref 5–10.5)
POTASSIUM REFLEX MAGNESIUM: 3.9 MMOL/L (ref 3.5–5.1)
PROTEIN UA: NEGATIVE MG/DL
RAPID INFLUENZA  B AGN: NEGATIVE
RAPID INFLUENZA A AGN: NEGATIVE
RBC # BLD: 5.21 M/UL (ref 4–5.2)
RBC UA: ABNORMAL /HPF (ref 0–4)
SARS-COV-2, NAAT: DETECTED
SODIUM BLD-SCNC: 141 MMOL/L (ref 136–145)
SPECIFIC GRAVITY UA: 1.01 (ref 1–1.03)
TOTAL PROTEIN: 7.3 G/DL (ref 6.4–8.2)
URINE REFLEX TO CULTURE: ABNORMAL
URINE TYPE: ABNORMAL
UROBILINOGEN, URINE: 0.2 E.U./DL
WBC # BLD: 6.3 K/UL (ref 4–11)
WBC UA: 8 /HPF (ref 0–5)

## 2022-09-21 PROCEDURE — 6360000002 HC RX W HCPCS: Performed by: EMERGENCY MEDICINE

## 2022-09-21 PROCEDURE — 6370000000 HC RX 637 (ALT 250 FOR IP): Performed by: EMERGENCY MEDICINE

## 2022-09-21 PROCEDURE — 96376 TX/PRO/DX INJ SAME DRUG ADON: CPT

## 2022-09-21 PROCEDURE — 83605 ASSAY OF LACTIC ACID: CPT

## 2022-09-21 PROCEDURE — 2580000003 HC RX 258: Performed by: EMERGENCY MEDICINE

## 2022-09-21 PROCEDURE — 85025 COMPLETE CBC W/AUTO DIFF WBC: CPT

## 2022-09-21 PROCEDURE — 80053 COMPREHEN METABOLIC PANEL: CPT

## 2022-09-21 PROCEDURE — 96361 HYDRATE IV INFUSION ADD-ON: CPT

## 2022-09-21 PROCEDURE — 99285 EMERGENCY DEPT VISIT HI MDM: CPT

## 2022-09-21 PROCEDURE — 87635 SARS-COV-2 COVID-19 AMP PRB: CPT

## 2022-09-21 PROCEDURE — 96374 THER/PROPH/DIAG INJ IV PUSH: CPT

## 2022-09-21 PROCEDURE — 83690 ASSAY OF LIPASE: CPT

## 2022-09-21 PROCEDURE — 36415 COLL VENOUS BLD VENIPUNCTURE: CPT

## 2022-09-21 PROCEDURE — 96372 THER/PROPH/DIAG INJ SC/IM: CPT

## 2022-09-21 PROCEDURE — 81001 URINALYSIS AUTO W/SCOPE: CPT

## 2022-09-21 PROCEDURE — 6360000004 HC RX CONTRAST MEDICATION: Performed by: EMERGENCY MEDICINE

## 2022-09-21 PROCEDURE — 96375 TX/PRO/DX INJ NEW DRUG ADDON: CPT

## 2022-09-21 PROCEDURE — 87804 INFLUENZA ASSAY W/OPTIC: CPT

## 2022-09-21 PROCEDURE — 74177 CT ABD & PELVIS W/CONTRAST: CPT

## 2022-09-21 RX ORDER — SODIUM CHLORIDE, SODIUM LACTATE, POTASSIUM CHLORIDE, AND CALCIUM CHLORIDE .6; .31; .03; .02 G/100ML; G/100ML; G/100ML; G/100ML
1000 INJECTION, SOLUTION INTRAVENOUS ONCE
Status: COMPLETED | OUTPATIENT
Start: 2022-09-21 | End: 2022-09-21

## 2022-09-21 RX ORDER — FENTANYL CITRATE 50 UG/ML
50 INJECTION, SOLUTION INTRAMUSCULAR; INTRAVENOUS
Status: COMPLETED | OUTPATIENT
Start: 2022-09-21 | End: 2022-09-21

## 2022-09-21 RX ORDER — BENZONATATE 100 MG/1
100 CAPSULE ORAL 3 TIMES DAILY PRN
Qty: 21 CAPSULE | Refills: 0 | Status: ON HOLD | OUTPATIENT
Start: 2022-09-21 | End: 2022-09-30 | Stop reason: HOSPADM

## 2022-09-21 RX ORDER — PROMETHAZINE HYDROCHLORIDE 25 MG/ML
25 INJECTION, SOLUTION INTRAMUSCULAR; INTRAVENOUS ONCE
Status: COMPLETED | OUTPATIENT
Start: 2022-09-21 | End: 2022-09-21

## 2022-09-21 RX ORDER — ONDANSETRON 4 MG/1
4 TABLET, ORALLY DISINTEGRATING ORAL 4 TIMES DAILY PRN
Qty: 15 TABLET | Refills: 0 | Status: ON HOLD | OUTPATIENT
Start: 2022-09-21 | End: 2022-09-30 | Stop reason: HOSPADM

## 2022-09-21 RX ORDER — ONDANSETRON 2 MG/ML
4 INJECTION INTRAMUSCULAR; INTRAVENOUS
Status: DISCONTINUED | OUTPATIENT
Start: 2022-09-21 | End: 2022-09-21 | Stop reason: HOSPADM

## 2022-09-21 RX ORDER — OXYCODONE HYDROCHLORIDE 10 MG/1
10 TABLET ORAL ONCE
Status: COMPLETED | OUTPATIENT
Start: 2022-09-21 | End: 2022-09-21

## 2022-09-21 RX ORDER — PROMETHAZINE HYDROCHLORIDE 25 MG/1
25 SUPPOSITORY RECTAL EVERY 6 HOURS PRN
Qty: 20 SUPPOSITORY | Refills: 0 | Status: ON HOLD | OUTPATIENT
Start: 2022-09-21 | End: 2022-09-30

## 2022-09-21 RX ADMIN — ONDANSETRON 4 MG: 2 INJECTION INTRAMUSCULAR; INTRAVENOUS at 10:44

## 2022-09-21 RX ADMIN — FENTANYL CITRATE 50 MCG: 50 INJECTION, SOLUTION INTRAMUSCULAR; INTRAVENOUS at 10:44

## 2022-09-21 RX ADMIN — PROMETHAZINE HYDROCHLORIDE 25 MG: 25 INJECTION INTRAMUSCULAR; INTRAVENOUS at 13:48

## 2022-09-21 RX ADMIN — ONDANSETRON 4 MG: 2 INJECTION INTRAMUSCULAR; INTRAVENOUS at 11:48

## 2022-09-21 RX ADMIN — OXYCODONE HYDROCHLORIDE 10 MG: 10 TABLET ORAL at 14:41

## 2022-09-21 RX ADMIN — IOPAMIDOL 75 ML: 755 INJECTION, SOLUTION INTRAVENOUS at 13:08

## 2022-09-21 RX ADMIN — FENTANYL CITRATE 50 MCG: 50 INJECTION, SOLUTION INTRAMUSCULAR; INTRAVENOUS at 11:48

## 2022-09-21 RX ADMIN — SODIUM CHLORIDE, POTASSIUM CHLORIDE, SODIUM LACTATE AND CALCIUM CHLORIDE 1000 ML: 600; 310; 30; 20 INJECTION, SOLUTION INTRAVENOUS at 10:44

## 2022-09-21 RX ADMIN — FENTANYL CITRATE 50 MCG: 50 INJECTION, SOLUTION INTRAMUSCULAR; INTRAVENOUS at 12:58

## 2022-09-21 ASSESSMENT — PAIN SCALES - GENERAL
PAINLEVEL_OUTOF10: 8
PAINLEVEL_OUTOF10: 7
PAINLEVEL_OUTOF10: 8
PAINLEVEL_OUTOF10: 9

## 2022-09-21 ASSESSMENT — PAIN - FUNCTIONAL ASSESSMENT
PAIN_FUNCTIONAL_ASSESSMENT: 0-10

## 2022-09-21 ASSESSMENT — PAIN DESCRIPTION - ORIENTATION: ORIENTATION: LOWER;MID

## 2022-09-21 ASSESSMENT — PAIN DESCRIPTION - LOCATION: LOCATION: ABDOMEN

## 2022-09-21 NOTE — ED NOTES
Discharge and education instructions reviewed. Patient verbalized understanding, teach-back successful. Patient denied questions at this time. No acute distress noted. Patient instructed to follow-up as noted - return to emergency department if symptoms worsen. Patient verbalized understanding. Discharged per EDMD with discharged instructions.        Judith Jhaveri RN  09/21/22 1500

## 2022-09-21 NOTE — ED NOTES
Introduce myself to the patient, identification band inplace, stretcher in the lowest position for safety, and the call light is in reach. Updated patient on Emergency Department plan of care, no additional needs at this time, will continue to monitor patient.         Marc Grajeda RN  09/21/22 [id number]

## 2022-09-21 NOTE — ED PROVIDER NOTES
Emergency Physician Note        Note Open Time: 2:05 PM EDT    Chief Complaint  Abdominal Pain (Hernia repair a week ago, started last night w worsening pain. Trouble keeping food/meds down. Fever yesterday, called office and was told to come in last night but wanted to hold off.)       History of Present Illness  Sarai Jones is a 38 y.o. female who presents to the ED for fever. Patient reports that she has a fever and body aches and has been vomiting out her pain medication. She is about a week out from a hernia repair with mesh with Dr. Sapna Peñaloza performed at this hospital.  She states because she has not been able to keep down the pain medicines her abdominal pain seems to have worsened. She denies any diarrhea or any bleeding of any kind. She also denies chest pain or shortness of breath. She has multiple allergies with the pain medicines and to antibiotics. Pain is moderate and constant. 10 systems reviewed, pertinent positives per HPI otherwise noted to be negative    I have reviewed the following from the nursing documentation:      Prior to Admission medications    Medication Sig Start Date End Date Taking? Authorizing Provider   HYDROcodone-acetaminophen (NORCO) 5-325 MG per tablet Take 1 tablet by mouth every 6 hours as needed for Pain for up to 7 days. Intended supply: 5 days. Take lowest dose possible to manage pain 9/14/22 9/21/22  Astrid Marx MD   VYVANSE 50 MG capsule Take 1 capsule by mouth every morning for 30 days. Take 50 mg by mouth every morning. Patient taking differently: Take 50 mg by mouth every morning. 9/8/22 10/8/22  FREDRICK Alcala - CNP   simvastatin (ZOCOR) 10 MG tablet Take 10 mg by mouth daily    Historical Provider, MD   gabapentin (NEURONTIN) 400 MG capsule Take 400 mg by mouth 3 times daily. Historical Provider, MD   oxyCODONE (ROXICODONE) 5 MG immediate release tablet Take 5 mg by mouth every 6 hours as needed for Pain.     Historical Provider, MD Allergies as of 09/21/2022 - Fully Reviewed 09/21/2022   Allergen Reaction Noted    Bee venom Shortness Of Breath and Swelling 10/17/2016    Bentyl [dicyclomine hcl] Shortness Of Breath and Anxiety 04/09/2014    Dicyclomine Anxiety and Shortness Of Breath 01/01/2014    Ketorolac tromethamine Hives and Itching 08/29/2018    Levofloxacin Anxiety and Hives 11/07/2019    Maitake Anaphylaxis 08/05/2021    Shiitake mushroom Anaphylaxis 03/11/2022    Sulfa antibiotics Shortness Of Breath 01/15/2014    Vancomycin Anaphylaxis and Hives 12/12/2015    Zosyn [piperacillin sod-tazobactam so] Hives, Shortness Of Breath, Nausea Only, and Dizziness or Vertigo 10/31/2019    Adhesive tape Dermatitis 11/07/2019    Oxycodone-acetaminophen Nausea And Vomiting 02/17/2016    Sulfacetamide Hives 08/05/2021    Acetaminophen Anxiety 05/30/2021    Butalbital-apap-caff-cod Anxiety 07/11/2015    Ceftaroline Rash 10/17/2016    Daptomycin Swelling and Rash 10/17/2016    Fosfomycin tromethamine Nausea And Vomiting 01/03/2022    Haldol [haloperidol] Anxiety 10/13/2021    Ketamine Nausea And Vomiting and Anxiety 08/05/2021    Methocarbamol Rash 12/11/2020    Morphine Hives 04/01/2014    Nitrofurantoin Nausea And Vomiting 04/07/2022    Prochlorperazine Anxiety 12/14/2016    Reglan [metoclopramide] Anxiety 55/43/9445    Silicone Hives, Swelling, and Rash 08/05/2021    Tazobactam Nausea And Vomiting and Anxiety 03/05/2021       Past Medical History:   Diagnosis Date    ADHD (attention deficit hyperactivity disorder) 1988    Depression with anxiety     Diabetes mellitus ([facility] [address]     pre-diabetes    Difficult intubation     airway swelled up    Encounter for imaging to screen for metal prior to MRI 06/01/2021    MRI Conditional Medtronic Non-Programmable shunt model#05769 implanted 10/30/2020 at [facility]. Normal Mode. 1.5T or 3.0T.     ESBL (extended spectrum beta-lactamase) producing bacteria infection 11/06/2019    urine    Functional ovarian cysts 2008    rt ovary cyst x 2 yrs.     Headache(784.0)     migraines    History of blood transfusion     at birth    History of kidney stones     History of PCOS     had hysterectomy in 2016    Hydrocephalus ([address]     Hyperlipidemia     Irritable bowel syndrome 2004    had colonoscopy about 6 yrs ago    Meningitis     Neutrophilic leukocytosis     Nicotine dependence     PONV (postoperative nausea and vomiting)     very nauseated and sometimes wakes up with a Migraine--happened once after brain surgery    Primary osteoarthritis of left knee 2016    S/P cone biopsy of cervix     Scoliosis .s    Seizures ([address]     twice--last one in 2022     (ventriculoperitoneal) shunt status     hydrocephalus f/w Neurosurgeon at [facility]    Wears glasses     reading        Surgical History:   Past Surgical History:   Procedure Laterality Date    ABDOMEN SURGERY N/A 2021    REMOVAL OF ABDOMINAL WALL MASS performed by Leanna Oliveira MD at [facility]      appendicitis     SECTION      placenta previa    CHOLECYSTECTOMY      COLONOSCOPY      COLPOSCOPY      CSF SHUNT      replaced at age 17    CYSTOSCOPY      stone removal    HEMORRHOID SURGERY      HERNIA REPAIR Bilateral     inguinal    HERNIA REPAIR      hiatal hernia    HYSTERECTOMY, TOTAL ABDOMINAL (CERVIX REMOVED)  2016    TAHBSO, adhesions/PCOS    KNEE ARTHROSCOPY Left 2015    medial meniscectomy, chondroplasty, plica resection    LITHOTRIPSY Bilateral 12/10/2019    OTHER SURGICAL HISTORY  10/19/2016    op lap    VENTRAL HERNIA REPAIR N/A 2022    LAPAROSCOPIC LYSIS OF ADHESIONS AND ABDOMINAL WALL MASS REMOVAL performed by Leanna Oliveira MD at [place] [address]      multiple revisions, most recent         Family History:    Family History   Problem Relation Age of Onset    High Blood Pressure Mother     Diabetes Mother     High Cholesterol Mother     Depression Mother     Diabetes Maternal Grandmother     High Blood Pressure Maternal Grandmother     High Cholesterol Maternal Grandmother     Heart Disease Maternal Grandfather     High Blood Pressure Maternal Grandfather     Heart Disease Paternal Grandmother     Stroke Paternal Grandfather     Depression Sister     Cirrhosis Father     Rheum Arthritis Neg Hx     Osteoarthritis Neg Hx     Asthma Neg Hx     Breast Cancer Neg Hx     Cancer Neg Hx     Heart Failure Neg Hx     Hypertension Neg Hx     Migraines Neg Hx     Ovarian Cancer Neg Hx     Rashes/Skin Problems Neg Hx     Seizures Neg Hx     Thyroid Disease Neg Hx        Social History     Socioeconomic History    Marital status: Single     Spouse name: Not on file    Number of children: Not on file    Years of education: Not on file    Highest education level: Not on file   Occupational History    Occupation: disability, SSI    Tobacco Use    Smoking status: Every Day     Packs/day: 0.50     Years: 18.00     Pack years: 9.00     Types: Cigarettes    Smokeless tobacco: Never   Vaping Use    Vaping Use: Never used   Substance and Sexual Activity    Alcohol use: No     Alcohol/week: 0.0 standard drinks    Drug use: Never    Sexual activity: Not Currently     Partners: Male   Other Topics Concern    Not on file   Social History Narrative    Not on file     Social Determinants of Health     Financial Resource Strain: Not on file   Food Insecurity: Not on file   Transportation Needs: Not on file   Physical Activity: Not on file   Stress: Not on file   Social Connections: Not on file   Intimate Partner Violence: Not on file   Housing Stability: Not on file       Nursing notes reviewed.     ED Triage Vitals [09/21/22 1009]   Enc Vitals Group      /78      Heart Rate 93      Resp 18      Temp 98.6 °F (37 °C)      Temp Source Oral      SpO2 99 %      Weight 150 lb (68 kg)      Height 4' 11\" (1.499 m)      Head Circumference       Peak Flow       Pain Score       Pain Loc       Pain Edu? Excl. in [address]? GENERAL:  Awake, alert. Well developed, well nourished with no apparent distress. HENT:  Normocephalic, Atraumatic, moist mucous membranes. EYES:  Pupils equal round and reactive to light, Conjunctiva normal, extraocular movements normal.  NECK:  No meningeal signs, Supple. CHEST:  Regular rate and rhythm, chest wall non-tender. LUNGS:  Clear to auscultation bilaterally. ABDOMEN:  Soft, mild diffuse tenderness, no rebound, rigidity or guarding, non-distended, normal bowel sounds. No costovertebral angle tenderness to palpation. BACK:  No tenderness. EXTREMITIES:  Normal range of motion, no edema, no bony tenderness, no deformity, distal pulses present. SKIN: Warm, dry and intact. Abdominal surgical wounds are intact with Steri-Strips and showed no signs of infection, no erythema, warmth or induration. NEUROLOGIC: Normal mental status. Moving all extremities to command. RADIOLOGY  X-RAYS:  I have reviewed radiologic plain film image(s). ALL OTHER NON-PLAIN FILM IMAGES SUCH AS CT, ULTRASOUND AND MRI HAVE BEEN READ BY THE RADIOLOGIST. CT ABDOMEN PELVIS W IV CONTRAST Additional Contrast? None   Final Result   New mild fluid along the right bladder dome could be physiologic or reactive,   less likely infectious. Otherwise no acute abnormality with stable findings   as above.               LABS  Labs Reviewed   COVID-19, RAPID - Abnormal; Notable for the following components:       Result Value    SARS-CoV-2, NAAT DETECTED (*)     All other components within normal limits   CBC WITH AUTO DIFFERENTIAL - Abnormal; Notable for the following components:    RBC 5.21 (*)     RDW 16.1 (*)     All other components within normal limits   COMPREHENSIVE METABOLIC PANEL W/ REFLEX TO MG FOR LOW K - Abnormal; Notable for the following components:    Glucose 149 (*)     Alkaline Phosphatase 227 (*)     ALT 84 (*)     All other components within normal limits   LIPASE - Abnormal; Notable for the following components:    Lipase 11.0 (*)     All other components within normal limits   URINALYSIS WITH REFLEX TO CULTURE - Abnormal; Notable for the following components:    Clarity, UA CLOUDY (*)     All other components within normal limits   MICROSCOPIC URINALYSIS - Abnormal; Notable for the following components:    Bacteria, UA 4+ (*)     WBC, UA 8 (*)     Epithelial Cells, UA 19 (*)     All other components within normal limits   RAPID INFLUENZA A/B ANTIGENS   LACTIC ACID   HCG, SERUM, QUALITATIVE       MEDICAL DECISION MAKING        After receiving treatment in the ER the patient's pain is improved. I discussed with her her COVID diagnosis and that I believe this is the cause of her fever. Her urinalysis does have some white cells and bacteria but also has an even greater number of epithelial cells and given her multiple antibiotic allergies I feel it is most prudent to wait for culture results before prescribing any antibiotic and the patient agrees. I am providing her with antiemetics for home as well as cough medicine. She has chronic pain medication at home already  The total Critical Care time is 35 minutes which excludes separately billable procedures. The critical care was concerning IV fluid bolus for dehydration. This time is exclusive of any time documented by any other providers. I advised the patient to return to the emergency department immediately for any new or worsening symptoms, such as chest pain or shortness of breath. The patient voiced agreement and understanding of the treatment plan. I estimate there is LOW risk for EPIGLOTTITIS, PNEUMONIA, MENINGITIS, OR URINARY TRACT INFECTION, ACUTE APPENDICITIS, BOWEL OBSTRUCTION, CHOLECYSTITIS, DIVERTICULITIS, INCARCERATED HERNIA, PANCREATITIS, or PERFORATED BOWEL or ULCER, thus I consider the discharge disposition reasonable.  Also, there is no evidence or peritonitis, sepsis, or toxicity. Jose Hernandes and I have discussed the diagnosis and risks, and we agree with discharging home to follow-up with their primary doctor. We also discussed returning to the Emergency Department immediately if new or worsening symptoms occur. We have discussed the symptoms which are most concerning (e.g., bloody stool, fever, changing or worsening pain, vomiting) that necessitate immediate return. FINAL Impression    1. COVID-19    2. Acute post-operative pain        Blood pressure (!) 105/91, pulse 70, temperature 98.3 °F (36.8 °C), resp. rate 14, height 4' 11\" (1.499 m), weight 150 lb (68 kg), last menstrual period 10/31/2016, SpO2 98 %, unknown if currently breastfeeding. Patient was given scripts for the following medications. I counseled patient how to take these medications. Discharge Medication List as of 9/21/2022  2:53 PM        START taking these medications    Details   benzonatate (TESSALON) 100 MG capsule Take 1 capsule by mouth 3 times daily as needed for Cough, Disp-21 capsule, R-0Normal      ondansetron (ZOFRAN ODT) 4 MG disintegrating tablet Take 1 tablet by mouth 4 times daily as needed for Nausea or Vomiting, Disp-15 tablet, R-0Normal      promethazine (PROMETHEGAN) 25 MG suppository Place 1 suppository rectally every 6 hours as needed for Nausea, Disp-20 suppository, R-0Normal             Disposition  Pt is in good condition upon Discharge to home. This chart was generated using the [address] [place] [address] [address] dictation system. I created this record but it may contain dictation errors.          Al Dewitt MD  09/21/22 2657

## 2022-09-22 ENCOUNTER — HOSPITAL ENCOUNTER (EMERGENCY)
Age: 42
Discharge: LEFT BEFORE DISCHARGE | DRG: 720 | End: 2022-09-22
Payer: COMMERCIAL

## 2022-09-22 VITALS
TEMPERATURE: 98.6 F | BODY MASS INDEX: 30.93 KG/M2 | SYSTOLIC BLOOD PRESSURE: 132 MMHG | HEART RATE: 87 BPM | HEIGHT: 59 IN | DIASTOLIC BLOOD PRESSURE: 87 MMHG | WEIGHT: 153.44 LBS | RESPIRATION RATE: 16 BRPM | OXYGEN SATURATION: 100 %

## 2022-09-22 DIAGNOSIS — Z53.20 LEFT BEFORE TREATMENT COMPLETED: Primary | ICD-10-CM

## 2022-09-22 PROBLEM — N39.0 UTI (URINARY TRACT INFECTION): Status: RESOLVED | Noted: 2022-08-23 | Resolved: 2022-09-22

## 2022-09-22 LAB
A/G RATIO: 1.3 (ref 1.1–2.2)
ALBUMIN SERPL-MCNC: 4.1 G/DL (ref 3.4–5)
ALP BLD-CCNC: 202 U/L (ref 40–129)
ALT SERPL-CCNC: 59 U/L (ref 10–40)
ANION GAP SERPL CALCULATED.3IONS-SCNC: 15 MMOL/L (ref 3–16)
AST SERPL-CCNC: 15 U/L (ref 15–37)
BASOPHILS ABSOLUTE: 0 K/UL (ref 0–0.2)
BASOPHILS RELATIVE PERCENT: 0.2 %
BILIRUB SERPL-MCNC: 0.5 MG/DL (ref 0–1)
BUN BLDV-MCNC: 6 MG/DL (ref 7–20)
CALCIUM SERPL-MCNC: 9.6 MG/DL (ref 8.3–10.6)
CHLORIDE BLD-SCNC: 101 MMOL/L (ref 99–110)
CO2: 22 MMOL/L (ref 21–32)
CREAT SERPL-MCNC: 0.6 MG/DL (ref 0.6–1.1)
EOSINOPHILS ABSOLUTE: 0.1 K/UL (ref 0–0.6)
EOSINOPHILS RELATIVE PERCENT: 1.1 %
GFR AFRICAN AMERICAN: >60
GFR NON-AFRICAN AMERICAN: >60
GLUCOSE BLD-MCNC: 140 MG/DL (ref 70–99)
HCT VFR BLD CALC: 41.7 % (ref 36–48)
HEMOGLOBIN: 14.2 G/DL (ref 12–16)
LIPASE: 11 U/L (ref 13–60)
LYMPHOCYTES ABSOLUTE: 1.5 K/UL (ref 1–5.1)
LYMPHOCYTES RELATIVE PERCENT: 22.9 %
MAGNESIUM: 2.2 MG/DL (ref 1.8–2.4)
MCH RBC QN AUTO: 28.3 PG (ref 26–34)
MCHC RBC AUTO-ENTMCNC: 34.2 G/DL (ref 31–36)
MCV RBC AUTO: 82.9 FL (ref 80–100)
MONOCYTES ABSOLUTE: 0.4 K/UL (ref 0–1.3)
MONOCYTES RELATIVE PERCENT: 5.6 %
NEUTROPHILS ABSOLUTE: 4.7 K/UL (ref 1.7–7.7)
NEUTROPHILS RELATIVE PERCENT: 70.2 %
OCCULT BLOOD DIAGNOSTIC: NORMAL
PDW BLD-RTO: 16.2 % (ref 12.4–15.4)
PLATELET # BLD: 219 K/UL (ref 135–450)
PMV BLD AUTO: 7.8 FL (ref 5–10.5)
POTASSIUM REFLEX MAGNESIUM: 3.5 MMOL/L (ref 3.5–5.1)
RBC # BLD: 5.03 M/UL (ref 4–5.2)
SODIUM BLD-SCNC: 138 MMOL/L (ref 136–145)
TOTAL PROTEIN: 7.2 G/DL (ref 6.4–8.2)
WBC # BLD: 6.7 K/UL (ref 4–11)

## 2022-09-22 PROCEDURE — 96372 THER/PROPH/DIAG INJ SC/IM: CPT

## 2022-09-22 PROCEDURE — 6370000000 HC RX 637 (ALT 250 FOR IP): Performed by: PHYSICIAN ASSISTANT

## 2022-09-22 PROCEDURE — 83690 ASSAY OF LIPASE: CPT

## 2022-09-22 PROCEDURE — 83735 ASSAY OF MAGNESIUM: CPT

## 2022-09-22 PROCEDURE — 6360000002 HC RX W HCPCS: Performed by: PHYSICIAN ASSISTANT

## 2022-09-22 PROCEDURE — 85025 COMPLETE CBC W/AUTO DIFF WBC: CPT

## 2022-09-22 PROCEDURE — 82270 OCCULT BLOOD FECES: CPT

## 2022-09-22 PROCEDURE — 36415 COLL VENOUS BLD VENIPUNCTURE: CPT

## 2022-09-22 PROCEDURE — 99284 EMERGENCY DEPT VISIT MOD MDM: CPT

## 2022-09-22 PROCEDURE — 80053 COMPREHEN METABOLIC PANEL: CPT

## 2022-09-22 RX ORDER — PROMETHAZINE HYDROCHLORIDE 25 MG/ML
25 INJECTION, SOLUTION INTRAMUSCULAR; INTRAVENOUS ONCE
Status: COMPLETED | OUTPATIENT
Start: 2022-09-22 | End: 2022-09-22

## 2022-09-22 RX ORDER — OXYCODONE HYDROCHLORIDE 5 MG/1
5 TABLET ORAL ONCE
Status: COMPLETED | OUTPATIENT
Start: 2022-09-22 | End: 2022-09-22

## 2022-09-22 RX ADMIN — PROMETHAZINE HYDROCHLORIDE 25 MG: 25 INJECTION INTRAMUSCULAR; INTRAVENOUS at 10:14

## 2022-09-22 RX ADMIN — OXYCODONE 5 MG: 5 TABLET ORAL at 11:41

## 2022-09-22 ASSESSMENT — PAIN SCALES - GENERAL: PAINLEVEL_OUTOF10: 9

## 2022-09-22 ASSESSMENT — ENCOUNTER SYMPTOMS
SORE THROAT: 0
ABDOMINAL PAIN: 1
SHORTNESS OF BREATH: 0
VOMITING: 1
BACK PAIN: 0
BLOOD IN STOOL: 1
DIARRHEA: 0
CONSTIPATION: 0
NAUSEA: 1

## 2022-09-22 ASSESSMENT — PAIN DESCRIPTION - LOCATION: LOCATION: ABDOMEN

## 2022-09-22 NOTE — ED PROVIDER NOTES
[address] [facility]      Pt Name: Luisa Jefferson  MRN: [id number]  [place] 1980  Date of evaluation: 9/22/2022  Provider: Celestina Garcia PA-C    This patient was not seen and evaluated by the attending physician No att. providers found. CHIEF COMPLAINT      Chief Complaint: abdominal pain, nausea, vomiting, blood in stool      HISTORYOF PRESENT ILLNESS  (Location/Symptom, Timing/Onset, Context/Setting, Quality, Duration, Modifying Factors, Severity.)   Luias Jefferson is a 38 y.o. female who presents to the emergency department with complaints of abdominal pain, nausea, vomiting and bloody stool. Her symptoms started a few days ago. She is 1 week status post surgery with Dr. Mary Gurrola. Postoperative diagnosis was partial intermittent small bowel obstruction due to adhesions and abdominal wall mass. She had laparoscopic lysis of adhesions and removal of subcutaneous abdominal wall mass measuring 2 cm. Nursing Notes were reviewed and I agree. REVIEW OF SYSTEMS    (2-9 systems for level 4, 10 or more forlevel 5)     Review of Systems   Constitutional:  Negative for chills and fever. HENT:  Negative for sore throat. Respiratory:  Negative for shortness of breath. Cardiovascular:  Negative for chest pain. Gastrointestinal:  Positive for abdominal pain, blood in stool, nausea and vomiting. Negative for constipation and diarrhea. Genitourinary:  Negative for difficulty urinating and dysuria. Musculoskeletal:  Negative for back pain. Skin:  Negative for rash. Neurological:  Negative for light-headedness and headaches. Psychiatric/Behavioral:  The patient is not nervous/anxious. All other systems reviewed and are negative. Positives and Pertinent negatives as per HPI. Except as noted above the remainder of the review of systems was reviewed and negative.        PAST MEDICALHISTORY         Diagnosis Date    ADHD (attention deficit hyperactivity disorder) 1988    Depression with anxiety     Diabetes mellitus ([address]     pre-diabetes    Difficult intubation     airway swelled up    Encounter for imaging to screen for metal prior to MRI 2021    MRI Conditional Med[employer]onic Non-Programmable shunt model#12061 implanted 10/30/2020 at [facility]. Normal Mode. 1.5T or 3.0T. ESBL (extended spectrum beta-lactamase) producing bacteria infection 2019    urine    Functional ovarian cysts 2008    rt ovary cyst x 2 yrs.     Headache(784.0)     migraines    History of blood transfusion     at birth    History of kidney stones     History of PCOS     had hysterectomy in 2016    Hydrocephalus [facility])     Hyperlipidemia     Irritable bowel syndrome 2004    had colonoscopy about 6 yrs ago    Meningitis     Neutrophilic leukocytosis     Nicotine dependence     PONV (postoperative nausea and vomiting)     very nauseated and sometimes wakes up with a Migraine--happened once after brain surgery    Primary osteoarthritis of left knee 2016    S/P cone biopsy of cervix     Scoliosis .s    Seizures ([address]     twice--last one in 2022     (ventriculoperitoneal) shunt status     hydrocephalus f/w Neurosurgeon at [facility]    Wears glasses     reading       SURGICAL HISTORY           Procedure Laterality Date    ABDOMEN SURGERY N/A 2021    REMOVAL OF ABDOMINAL WALL MASS performed by Ananya Peraza MD at [facility]      appendicitis     SECTION  2005    placenta previa    CHOLECYSTECTOMY      COLONOSCOPY  2004    COLPOSCOPY  2004    CSF SHUNT      replaced at age 17    CYSTOSCOPY      stone removal    HEMORRHOID SURGERY      HERNIA REPAIR Bilateral     inguinal    HERNIA REPAIR      hiatal hernia    HYSTERECTOMY, TOTAL ABDOMINAL (CERVIX REMOVED)  2016    TAHBSO, adhesions/PCOS    KNEE ARTHROSCOPY Left 2015    medial meniscectomy, chondroplasty, plica resection LITHOTRIPSY Bilateral 12/10/2019    OTHER SURGICAL HISTORY  10/19/2016    op lap    VENTRAL HERNIA REPAIR N/A 9/14/2022    LAPAROSCOPIC LYSIS OF ADHESIONS AND ABDOMINAL WALL MASS REMOVAL performed by Janee Siegel MD at [place] [address]      multiple revisions, most recent 2015       CURRENT MEDICATIONS       Discharge Medication List as of 9/22/2022  1:52 PM        CONTINUE these medications which have NOT CHANGED    Details   benzonatate (TESSALON) 100 MG capsule Take 1 capsule by mouth 3 times daily as needed for Cough, Disp-21 capsule, R-0Normal      ondansetron (ZOFRAN ODT) 4 MG disintegrating tablet Take 1 tablet by mouth 4 times daily as needed for Nausea or Vomiting, Disp-15 tablet, R-0Normal      promethazine (PROMETHEGAN) 25 MG suppository Place 1 suppository rectally every 6 hours as needed for Nausea, Disp-20 suppository, R-0Normal      VYVANSE 50 MG capsule Take 1 capsule by mouth every morning for 30 days. Take 50 mg by mouth every morning., Disp-30 capsule, R-0, DAWNormal      simvastatin (ZOCOR) 10 MG tablet Take 10 mg by mouth dailyHistorical Med      gabapentin (NEURONTIN) 400 MG capsule Take 400 mg by mouth 3 times daily. Historical Med      oxyCODONE (ROXICODONE) 5 MG immediate release tablet Take 5 mg by mouth every 6 hours as needed for Pain. Historical Med             ALLERGIES     Bee venom, Bentyl [dicyclomine hcl], Dicyclomine, Ketorolac tromethamine, Levofloxacin, Maitake, Shiitake mushroom, Sulfa antibiotics, Vancomycin, Zosyn [piperacillin sod-tazobactam so], Adhesive tape, Oxycodone-acetaminophen, Sulfacetamide, Acetaminophen, Butalbital-apap-caff-cod, Ceftaroline, Daptomycin, Fosfomycin tromethamine, Haldol [haloperidol], Ketamine, Methocarbamol, Morphine, Nitrofurantoin, Prochlorperazine, Reglan [metoclopramide], Silicone, and Tazobactam    FAMILY HISTORY           Problem Relation Age of Onset    High Blood Pressure Mother     Diabetes Mother     High Cholesterol Mother     Depression Mother     Diabetes Maternal Grandmother     High Blood Pressure Maternal Grandmother     High Cholesterol Maternal Grandmother     Heart Disease Maternal Grandfather     High Blood Pressure Maternal Grandfather     Heart Disease Paternal Grandmother     Stroke Paternal Grandfather     Depression Sister     Cirrhosis Father     Rheum Arthritis Neg Hx     Osteoarthritis Neg Hx     Asthma Neg Hx     Breast Cancer Neg Hx     Cancer Neg Hx     Heart Failure Neg Hx     Hypertension Neg Hx     Migraines Neg Hx     Ovarian Cancer Neg Hx     Rashes/Skin Problems Neg Hx     Seizures Neg Hx     Thyroid Disease Neg Hx      Family Status   Relation Name Status    Mother  Alive    MGM  Alive        copd, goiter    MGF      PGM  Alive        alcoholic    PGF  Alive    Sister  Alive        scoliosis    Father   at age 50        cirhosis liver    Sister  Alive        scoliosis    Brother  Alive        adhd, mild autism    Neg Hx  (Not Specified)        SOCIAL HISTORY    reports that she has been smoking cigarettes. She has a 9.00 pack-year smoking history. She has never used smokeless tobacco. She reports that she does not drink alcohol and does not use drugs. PHYSICAL EXAM    (up to 7 for level 4, 8 or more for level 5)     ED Triage Vitals [22 0927]   BP Temp Temp Source Heart Rate Resp SpO2 Height Weight   132/87 98.6 °F (37 °C) Oral 87 16 100 % 4' 11\" (1.499 m) 153 lb 7 oz (69.6 kg)       Physical Exam  Vitals and nursing note reviewed. Constitutional:       General: She is not in acute distress. Appearance: She is well-developed. HENT:      Head: Normocephalic and atraumatic. Cardiovascular:      Rate and Rhythm: Normal rate and regular rhythm. Heart sounds: Normal heart sounds. Pulmonary:      Effort: Pulmonary effort is normal. No respiratory distress. Breath sounds: Normal breath sounds. Abdominal:      Palpations: Abdomen is soft. Tenderness: abdominal tenderness (mid suprapubic)      Comments: Healing laparoscopic surgical scars without erythema or induration   Genitourinary:     Comments: Rectal exam performed with chaperone SHANA Curry. There are old hemorrhoids noted. No active bleeding. Brown stool in the vault that is Hemoccult negative. Musculoskeletal:         General: Normal range of motion. Cervical back: Neck supple. Skin:     General: Skin is warm and dry. Neurological:      Mental Status: She is alert and oriented to person, place, and time. Psychiatric:         Behavior: Behavior normal.          DIAGNOSTIC RESULTS         LABS:  Labs Reviewed   CBC WITH AUTO DIFFERENTIAL - Abnormal; Notable for the following components:       Result Value    RDW 16.2 (*)     All other components within normal limits   COMPREHENSIVE METABOLIC PANEL W/ REFLEX TO MG FOR LOW K - Abnormal; Notable for the following components:    Glucose 140 (*)     BUN 6 (*)     Alkaline Phosphatase 202 (*)     ALT 59 (*)     All other components within normal limits   LIPASE - Abnormal; Notable for the following components:    Lipase 11.0 (*)     All other components within normal limits   URINALYSIS WITH REFLEX TO CULTURE   BLOOD OCCULT STOOL DIAGNOSTIC    Narrative:     ORDER#: [id number]                          ORDERED BY: DUKE JUAREZ  SOURCE: Stool                              COLLECTED:  09/22/22 10:05  ANTIBIOTICS AT ANTELMO.:                      RECEIVED :  09/22/22 11:56   MAGNESIUM   PREGNANCY, URINE       When ordered, only abnormal lab results are displayed. All other labs are within normal range or not returned as of the dictation. EMERGENCY DEPARTMENT COURSE and DIFFERENTIAL DIAGNOSIS/MDM:   Vitals:    Vitals:    09/22/22 0927   BP: 132/87   Pulse: 87   Resp: 16   Temp: 98.6 °F (37 °C)   TempSrc: Oral   SpO2: 100%   Weight: 153 lb 7 oz (69.6 kg)   Height: 4' 11\" (1.499 m)        I have evaluated this patient.   My supervising physician was available for consultation. The patient is hemodynamically stable. Her rectal exam revealed brown stool, no active hemorrhage and was Hemoccult negative. She did have old hemorrhoidal tags. H&H stable. Electrolytes normal.  White blood cell count normal.  Renal function normal.  Patient just submitted a urine sample yesterday. She was unable to provide a sample today. She felt better after IM Phenergan. She was also given her home medication. She had no vomiting while here. The patient was called back to her room to see if she could provide a urine sample and to go over her results, she was nowhere to be found and it was determined the patient had eloped from the department    PROCEDURES:  None    FINAL IMPRESSION      1. Left before treatment completed          DISPOSITION/PLAN   DISPOSITION Eloped - Left Before Treatment Complete 09/22/2022 02:36:37 PM      PATIENT REFERRED TO:  No follow-up provider specified.     MEDICATIONS:  Discharge Medication List as of 9/22/2022  1:52 PM          (Please note that portions of this note were completed with a voice recognition program.  Efforts were made toedit the dictations but occasionally words are mis-transcribed.)    MICHELLE De Luna PA-C  09/22/22 9150

## 2022-09-22 NOTE — ED NOTES
This RN wasted 25 mg Promethazine IM, as it was a dual pull by another RN who then administered the medication to the patient.       Camilo Simpson RN  09/22/22 5744

## 2022-09-23 ENCOUNTER — TELEPHONE (OUTPATIENT)
Dept: PRIMARY CARE CLINIC | Age: 42
End: 2022-09-23

## 2022-09-23 ENCOUNTER — E-VISIT (OUTPATIENT)
Dept: PRIMARY CARE CLINIC | Age: 42
End: 2022-09-23
Payer: COMMERCIAL

## 2022-09-23 DIAGNOSIS — F41.9 ANXIETY: Primary | ICD-10-CM

## 2022-09-23 DIAGNOSIS — U07.1 COVID: Primary | ICD-10-CM

## 2022-09-23 DIAGNOSIS — R05.9 COUGH: ICD-10-CM

## 2022-09-23 PROBLEM — K86.2 CYST AND PSEUDOCYST OF PANCREAS: Status: ACTIVE | Noted: 2020-10-13

## 2022-09-23 PROBLEM — K86.3 CYST AND PSEUDOCYST OF PANCREAS: Status: ACTIVE | Noted: 2020-10-13

## 2022-09-23 PROCEDURE — 99421 OL DIG E/M SVC 5-10 MIN: CPT | Performed by: NURSE PRACTITIONER

## 2022-09-23 RX ORDER — HYDROXYZINE HYDROCHLORIDE 25 MG/1
25 TABLET, FILM COATED ORAL EVERY 8 HOURS PRN
Qty: 30 TABLET | Refills: 0 | Status: SHIPPED | OUTPATIENT
Start: 2022-09-23 | End: 2022-10-03

## 2022-09-23 RX ORDER — BROMPHENIRAMINE MALEATE, PSEUDOEPHEDRINE HYDROCHLORIDE, AND DEXTROMETHORPHAN HYDROBROMIDE 2; 30; 10 MG/5ML; MG/5ML; MG/5ML
5 SYRUP ORAL 4 TIMES DAILY PRN
Qty: 118 ML | Refills: 0 | Status: SHIPPED | OUTPATIENT
Start: 2022-09-23 | End: 2022-10-09

## 2022-09-23 RX ORDER — METHYLPREDNISOLONE 4 MG/1
TABLET ORAL
Qty: 1 KIT | Refills: 0 | Status: ON HOLD | OUTPATIENT
Start: 2022-09-23 | End: 2022-09-30 | Stop reason: HOSPADM

## 2022-09-23 RX ORDER — ALBUTEROL SULFATE 90 UG/1
2 AEROSOL, METERED RESPIRATORY (INHALATION) 4 TIMES DAILY PRN
Qty: 54 G | Refills: 1 | Status: SHIPPED | OUTPATIENT
Start: 2022-09-23

## 2022-09-23 ASSESSMENT — LIFESTYLE VARIABLES
SMOKING_YEARS: 20
SMOKING_STATUS: YES

## 2022-09-23 NOTE — PROGRESS NOTES
COVID positive. Went to ER on 9/21 and 9/22. Given phenergan and tessalon perles. Sent Bromfed DM, albuterol inhaler and steroids for cough. Discussed conservative and symptomatic treatment of symptoms. 5-10 minutes were spent on the digital evaluation and management of this patient.

## 2022-09-25 ENCOUNTER — APPOINTMENT (OUTPATIENT)
Dept: CT IMAGING | Age: 42
DRG: 720 | End: 2022-09-25
Payer: COMMERCIAL

## 2022-09-25 ENCOUNTER — HOSPITAL ENCOUNTER (INPATIENT)
Age: 42
LOS: 5 days | Discharge: HOME | DRG: 720 | End: 2022-09-30
Attending: EMERGENCY MEDICINE | Admitting: STUDENT IN AN ORGANIZED HEALTH CARE EDUCATION/TRAINING PROGRAM
Payer: COMMERCIAL

## 2022-09-25 ENCOUNTER — APPOINTMENT (OUTPATIENT)
Dept: GENERAL RADIOLOGY | Age: 42
DRG: 720 | End: 2022-09-25
Payer: COMMERCIAL

## 2022-09-25 DIAGNOSIS — A41.9 SEPSIS WITHOUT ACUTE ORGAN DYSFUNCTION, DUE TO UNSPECIFIED ORGANISM (HCC): Primary | ICD-10-CM

## 2022-09-25 DIAGNOSIS — R10.84 GENERALIZED ABDOMINAL PAIN: ICD-10-CM

## 2022-09-25 PROBLEM — R11.2 NAUSEA AND VOMITING: Status: ACTIVE | Noted: 2022-09-25

## 2022-09-25 PROBLEM — R10.9 ABDOMINAL PAIN: Status: ACTIVE | Noted: 2022-09-25

## 2022-09-25 PROBLEM — U07.1 COVID: Status: ACTIVE | Noted: 2022-09-25

## 2022-09-25 LAB
A/G RATIO: 1.8 (ref 1.1–2.2)
ALBUMIN SERPL-MCNC: 5.1 G/DL (ref 3.4–5)
ALP BLD-CCNC: 171 U/L (ref 40–129)
ALT SERPL-CCNC: 34 U/L (ref 10–40)
ANION GAP SERPL CALCULATED.3IONS-SCNC: 16 MMOL/L (ref 3–16)
AST SERPL-CCNC: 17 U/L (ref 15–37)
BASOPHILS ABSOLUTE: 0.1 K/UL (ref 0–0.2)
BASOPHILS RELATIVE PERCENT: 0.5 %
BILIRUB SERPL-MCNC: 0.4 MG/DL (ref 0–1)
BILIRUBIN URINE: NEGATIVE
BLOOD, URINE: NEGATIVE
BUN BLDV-MCNC: 12 MG/DL (ref 7–20)
CALCIUM SERPL-MCNC: 9.8 MG/DL (ref 8.3–10.6)
CHLORIDE BLD-SCNC: 102 MMOL/L (ref 99–110)
CLARITY: CLEAR
CO2: 21 MMOL/L (ref 21–32)
COLOR: YELLOW
CREAT SERPL-MCNC: 0.8 MG/DL (ref 0.6–1.1)
EOSINOPHILS ABSOLUTE: 0 K/UL (ref 0–0.6)
EOSINOPHILS RELATIVE PERCENT: 0.3 %
GFR AFRICAN AMERICAN: >60
GFR NON-AFRICAN AMERICAN: >60
GLUCOSE BLD-MCNC: 122 MG/DL (ref 70–99)
GLUCOSE BLD-MCNC: 129 MG/DL (ref 70–99)
GLUCOSE URINE: NEGATIVE MG/DL
HCT VFR BLD CALC: 45.3 % (ref 36–48)
HEMOGLOBIN: 15.3 G/DL (ref 12–16)
KETONES, URINE: NEGATIVE MG/DL
LACTIC ACID, SEPSIS: 1.7 MMOL/L (ref 0.4–1.9)
LACTIC ACID: 2.2 MMOL/L (ref 0.4–2)
LEUKOCYTE ESTERASE, URINE: NEGATIVE
LIPASE: 17 U/L (ref 13–60)
LYMPHOCYTES ABSOLUTE: 1.8 K/UL (ref 1–5.1)
LYMPHOCYTES RELATIVE PERCENT: 12.4 %
MCH RBC QN AUTO: 28 PG (ref 26–34)
MCHC RBC AUTO-ENTMCNC: 33.8 G/DL (ref 31–36)
MCV RBC AUTO: 82.7 FL (ref 80–100)
MICROSCOPIC EXAMINATION: NORMAL
MONOCYTES ABSOLUTE: 0.6 K/UL (ref 0–1.3)
MONOCYTES RELATIVE PERCENT: 3.8 %
NEUTROPHILS ABSOLUTE: 12.3 K/UL (ref 1.7–7.7)
NEUTROPHILS RELATIVE PERCENT: 83 %
NITRITE, URINE: NEGATIVE
PDW BLD-RTO: 15.7 % (ref 12.4–15.4)
PERFORMED ON: ABNORMAL
PH UA: 6.5 (ref 5–8)
PLATELET # BLD: 395 K/UL (ref 135–450)
PMV BLD AUTO: 7.4 FL (ref 5–10.5)
POTASSIUM REFLEX MAGNESIUM: 3.8 MMOL/L (ref 3.5–5.1)
PROTEIN UA: NEGATIVE MG/DL
RBC # BLD: 5.48 M/UL (ref 4–5.2)
SODIUM BLD-SCNC: 139 MMOL/L (ref 136–145)
SPECIFIC GRAVITY UA: 1.01 (ref 1–1.03)
TOTAL PROTEIN: 7.9 G/DL (ref 6.4–8.2)
URINE REFLEX TO CULTURE: NORMAL
URINE TYPE: NORMAL
UROBILINOGEN, URINE: 0.2 E.U./DL
WBC # BLD: 14.8 K/UL (ref 4–11)

## 2022-09-25 PROCEDURE — 2580000003 HC RX 258: Performed by: EMERGENCY MEDICINE

## 2022-09-25 PROCEDURE — 74176 CT ABD & PELVIS W/O CONTRAST: CPT

## 2022-09-25 PROCEDURE — 74177 CT ABD & PELVIS W/CONTRAST: CPT

## 2022-09-25 PROCEDURE — 83605 ASSAY OF LACTIC ACID: CPT

## 2022-09-25 PROCEDURE — 6370000000 HC RX 637 (ALT 250 FOR IP): Performed by: NURSE PRACTITIONER

## 2022-09-25 PROCEDURE — 83690 ASSAY OF LIPASE: CPT

## 2022-09-25 PROCEDURE — 80053 COMPREHEN METABOLIC PANEL: CPT

## 2022-09-25 PROCEDURE — 2580000003 HC RX 258: Performed by: NURSE PRACTITIONER

## 2022-09-25 PROCEDURE — 6360000002 HC RX W HCPCS: Performed by: NURSE PRACTITIONER

## 2022-09-25 PROCEDURE — 96375 TX/PRO/DX INJ NEW DRUG ADDON: CPT

## 2022-09-25 PROCEDURE — 81003 URINALYSIS AUTO W/O SCOPE: CPT

## 2022-09-25 PROCEDURE — 6370000000 HC RX 637 (ALT 250 FOR IP): Performed by: EMERGENCY MEDICINE

## 2022-09-25 PROCEDURE — 71046 X-RAY EXAM CHEST 2 VIEWS: CPT

## 2022-09-25 PROCEDURE — 99285 EMERGENCY DEPT VISIT HI MDM: CPT

## 2022-09-25 PROCEDURE — 85025 COMPLETE CBC W/AUTO DIFF WBC: CPT

## 2022-09-25 PROCEDURE — 6360000002 HC RX W HCPCS: Performed by: EMERGENCY MEDICINE

## 2022-09-25 PROCEDURE — 6360000004 HC RX CONTRAST MEDICATION: Performed by: EMERGENCY MEDICINE

## 2022-09-25 PROCEDURE — 96360 HYDRATION IV INFUSION INIT: CPT

## 2022-09-25 PROCEDURE — 87040 BLOOD CULTURE FOR BACTERIA: CPT

## 2022-09-25 PROCEDURE — 36415 COLL VENOUS BLD VENIPUNCTURE: CPT

## 2022-09-25 PROCEDURE — 96361 HYDRATE IV INFUSION ADD-ON: CPT

## 2022-09-25 PROCEDURE — 1200000000 HC SEMI PRIVATE

## 2022-09-25 PROCEDURE — 96374 THER/PROPH/DIAG INJ IV PUSH: CPT

## 2022-09-25 RX ORDER — HYDROXYZINE HYDROCHLORIDE 25 MG/1
25 TABLET, FILM COATED ORAL EVERY 8 HOURS PRN
Status: DISCONTINUED | OUTPATIENT
Start: 2022-09-25 | End: 2022-09-30 | Stop reason: HOSPADM

## 2022-09-25 RX ORDER — ONDANSETRON 2 MG/ML
4 INJECTION INTRAMUSCULAR; INTRAVENOUS ONCE
Status: COMPLETED | OUTPATIENT
Start: 2022-09-25 | End: 2022-09-25

## 2022-09-25 RX ORDER — SODIUM CHLORIDE 0.9 % (FLUSH) 0.9 %
5-40 SYRINGE (ML) INJECTION PRN
Status: DISCONTINUED | OUTPATIENT
Start: 2022-09-25 | End: 2022-09-30 | Stop reason: HOSPADM

## 2022-09-25 RX ORDER — SODIUM CHLORIDE 9 MG/ML
25 INJECTION, SOLUTION INTRAVENOUS PRN
Status: DISCONTINUED | OUTPATIENT
Start: 2022-09-25 | End: 2022-09-25 | Stop reason: SDUPTHER

## 2022-09-25 RX ORDER — ENOXAPARIN SODIUM 100 MG/ML
40 INJECTION SUBCUTANEOUS DAILY
Status: DISCONTINUED | OUTPATIENT
Start: 2022-09-26 | End: 2022-09-30 | Stop reason: HOSPADM

## 2022-09-25 RX ORDER — SODIUM CHLORIDE 0.9 % (FLUSH) 0.9 %
5-40 SYRINGE (ML) INJECTION EVERY 12 HOURS SCHEDULED
Status: DISCONTINUED | OUTPATIENT
Start: 2022-09-25 | End: 2022-09-25 | Stop reason: SDUPTHER

## 2022-09-25 RX ORDER — ATORVASTATIN CALCIUM 10 MG/1
10 TABLET, FILM COATED ORAL DAILY
Status: DISCONTINUED | OUTPATIENT
Start: 2022-09-26 | End: 2022-09-30 | Stop reason: HOSPADM

## 2022-09-25 RX ORDER — ONDANSETRON 4 MG/1
8 TABLET, ORALLY DISINTEGRATING ORAL ONCE
Status: COMPLETED | OUTPATIENT
Start: 2022-09-25 | End: 2022-09-25

## 2022-09-25 RX ORDER — ONDANSETRON 2 MG/ML
4 INJECTION INTRAMUSCULAR; INTRAVENOUS EVERY 6 HOURS PRN
Status: DISCONTINUED | OUTPATIENT
Start: 2022-09-25 | End: 2022-09-30 | Stop reason: HOSPADM

## 2022-09-25 RX ORDER — ACETAMINOPHEN 650 MG/1
650 SUPPOSITORY RECTAL EVERY 6 HOURS PRN
Status: DISCONTINUED | OUTPATIENT
Start: 2022-09-25 | End: 2022-09-30 | Stop reason: HOSPADM

## 2022-09-25 RX ORDER — ACETAMINOPHEN 325 MG/1
650 TABLET ORAL EVERY 6 HOURS PRN
Status: DISCONTINUED | OUTPATIENT
Start: 2022-09-25 | End: 2022-09-30 | Stop reason: HOSPADM

## 2022-09-25 RX ORDER — SODIUM CHLORIDE 0.9 % (FLUSH) 0.9 %
5-40 SYRINGE (ML) INJECTION PRN
Status: DISCONTINUED | OUTPATIENT
Start: 2022-09-25 | End: 2022-09-25 | Stop reason: SDUPTHER

## 2022-09-25 RX ORDER — LIDOCAINE HYDROCHLORIDE 10 MG/ML
5 INJECTION, SOLUTION EPIDURAL; INFILTRATION; INTRACAUDAL; PERINEURAL ONCE
Status: DISCONTINUED | OUTPATIENT
Start: 2022-09-25 | End: 2022-09-30 | Stop reason: HOSPADM

## 2022-09-25 RX ORDER — SODIUM CHLORIDE 9 MG/ML
INJECTION, SOLUTION INTRAVENOUS PRN
Status: DISCONTINUED | OUTPATIENT
Start: 2022-09-25 | End: 2022-09-30 | Stop reason: HOSPADM

## 2022-09-25 RX ORDER — SODIUM CHLORIDE 9 MG/ML
INJECTION, SOLUTION INTRAVENOUS PRN
Status: DISCONTINUED | OUTPATIENT
Start: 2022-09-25 | End: 2022-09-25 | Stop reason: SDUPTHER

## 2022-09-25 RX ORDER — SODIUM CHLORIDE 9 MG/ML
INJECTION, SOLUTION INTRAVENOUS CONTINUOUS
Status: DISCONTINUED | OUTPATIENT
Start: 2022-09-25 | End: 2022-09-30 | Stop reason: HOSPADM

## 2022-09-25 RX ORDER — 0.9 % SODIUM CHLORIDE 0.9 %
1000 INTRAVENOUS SOLUTION INTRAVENOUS ONCE
Status: COMPLETED | OUTPATIENT
Start: 2022-09-25 | End: 2022-09-25

## 2022-09-25 RX ORDER — OXYCODONE HYDROCHLORIDE 5 MG/1
5 TABLET ORAL EVERY 6 HOURS PRN
Status: DISCONTINUED | OUTPATIENT
Start: 2022-09-25 | End: 2022-09-26

## 2022-09-25 RX ORDER — DIPHENHYDRAMINE HYDROCHLORIDE 50 MG/ML
25 INJECTION INTRAMUSCULAR; INTRAVENOUS EVERY 6 HOURS PRN
Status: DISCONTINUED | OUTPATIENT
Start: 2022-09-25 | End: 2022-09-30 | Stop reason: HOSPADM

## 2022-09-25 RX ORDER — ONDANSETRON 4 MG/1
4 TABLET, ORALLY DISINTEGRATING ORAL EVERY 8 HOURS PRN
Status: DISCONTINUED | OUTPATIENT
Start: 2022-09-25 | End: 2022-09-30 | Stop reason: HOSPADM

## 2022-09-25 RX ORDER — POLYETHYLENE GLYCOL 3350 17 G/17G
17 POWDER, FOR SOLUTION ORAL DAILY PRN
Status: DISCONTINUED | OUTPATIENT
Start: 2022-09-25 | End: 2022-09-30 | Stop reason: HOSPADM

## 2022-09-25 RX ORDER — GABAPENTIN 400 MG/1
400 CAPSULE ORAL 3 TIMES DAILY
Status: DISCONTINUED | OUTPATIENT
Start: 2022-09-25 | End: 2022-09-30 | Stop reason: HOSPADM

## 2022-09-25 RX ORDER — 0.9 % SODIUM CHLORIDE 0.9 %
30 INTRAVENOUS SOLUTION INTRAVENOUS ONCE
Status: DISCONTINUED | OUTPATIENT
Start: 2022-09-25 | End: 2022-09-30 | Stop reason: HOSPADM

## 2022-09-25 RX ORDER — SODIUM CHLORIDE 0.9 % (FLUSH) 0.9 %
5-40 SYRINGE (ML) INJECTION EVERY 12 HOURS SCHEDULED
Status: DISCONTINUED | OUTPATIENT
Start: 2022-09-26 | End: 2022-09-30 | Stop reason: HOSPADM

## 2022-09-25 RX ADMIN — GABAPENTIN 400 MG: 400 CAPSULE ORAL at 23:28

## 2022-09-25 RX ADMIN — HYDROMORPHONE HYDROCHLORIDE 1 MG: 1 INJECTION, SOLUTION INTRAMUSCULAR; INTRAVENOUS; SUBCUTANEOUS at 18:27

## 2022-09-25 RX ADMIN — ONDANSETRON 8 MG: 4 TABLET, ORALLY DISINTEGRATING ORAL at 14:52

## 2022-09-25 RX ADMIN — IOPAMIDOL 75 ML: 755 INJECTION, SOLUTION INTRAVENOUS at 18:22

## 2022-09-25 RX ADMIN — SODIUM CHLORIDE: 9 INJECTION, SOLUTION INTRAVENOUS at 23:14

## 2022-09-25 RX ADMIN — HYDROXYZINE HYDROCHLORIDE 25 MG: 25 TABLET, FILM COATED ORAL at 23:28

## 2022-09-25 RX ADMIN — Medication 25 MG: at 23:27

## 2022-09-25 RX ADMIN — ONDANSETRON 4 MG: 2 INJECTION INTRAMUSCULAR; INTRAVENOUS at 18:27

## 2022-09-25 RX ADMIN — OXYCODONE 5 MG: 5 TABLET ORAL at 23:28

## 2022-09-25 RX ADMIN — SODIUM CHLORIDE 1000 ML: 9 INJECTION, SOLUTION INTRAVENOUS at 18:00

## 2022-09-25 RX ADMIN — DIPHENHYDRAMINE HYDROCHLORIDE 25 MG: 50 INJECTION, SOLUTION INTRAMUSCULAR; INTRAVENOUS at 20:59

## 2022-09-25 ASSESSMENT — PAIN SCALES - GENERAL
PAINLEVEL_OUTOF10: 10
PAINLEVEL_OUTOF10: 9

## 2022-09-25 ASSESSMENT — PAIN DESCRIPTION - ONSET: ONSET: ON-GOING

## 2022-09-25 ASSESSMENT — PAIN DESCRIPTION - LOCATION
LOCATION: ABDOMEN
LOCATION: ABDOMEN

## 2022-09-25 ASSESSMENT — PAIN DESCRIPTION - PAIN TYPE
TYPE: ACUTE PAIN
TYPE: ACUTE PAIN

## 2022-09-25 ASSESSMENT — PAIN DESCRIPTION - FREQUENCY: FREQUENCY: CONTINUOUS

## 2022-09-25 ASSESSMENT — PAIN DESCRIPTION - ORIENTATION
ORIENTATION: RIGHT
ORIENTATION: LOWER;RIGHT

## 2022-09-25 ASSESSMENT — PAIN DESCRIPTION - DESCRIPTORS
DESCRIPTORS: PATIENT UNABLE TO DESCRIBE
DESCRIPTORS: SHARP

## 2022-09-25 ASSESSMENT — ENCOUNTER SYMPTOMS
COUGH: 0
VOMITING: 1
ABDOMINAL PAIN: 1
DIARRHEA: 0
EYE REDNESS: 0
SHORTNESS OF BREATH: 0
BACK PAIN: 0
SORE THROAT: 0
EYE PAIN: 0
COLOR CHANGE: 0
NAUSEA: 1

## 2022-09-26 ENCOUNTER — TELEPHONE (OUTPATIENT)
Dept: PRIMARY CARE CLINIC | Age: 42
End: 2022-09-26

## 2022-09-26 LAB
ANION GAP SERPL CALCULATED.3IONS-SCNC: 10 MMOL/L (ref 3–16)
BASOPHILS ABSOLUTE: 0.1 K/UL (ref 0–0.2)
BASOPHILS RELATIVE PERCENT: 0.8 %
BUN BLDV-MCNC: 15 MG/DL (ref 7–20)
CALCIUM SERPL-MCNC: 8.5 MG/DL (ref 8.3–10.6)
CHLORIDE BLD-SCNC: 108 MMOL/L (ref 99–110)
CO2: 24 MMOL/L (ref 21–32)
CREAT SERPL-MCNC: 0.6 MG/DL (ref 0.6–1.1)
EOSINOPHILS ABSOLUTE: 0.1 K/UL (ref 0–0.6)
EOSINOPHILS RELATIVE PERCENT: 0.5 %
GFR AFRICAN AMERICAN: >60
GFR NON-AFRICAN AMERICAN: >60
GLUCOSE BLD-MCNC: 122 MG/DL (ref 70–99)
GLUCOSE BLD-MCNC: 149 MG/DL (ref 70–99)
GLUCOSE BLD-MCNC: 81 MG/DL (ref 70–99)
GLUCOSE BLD-MCNC: 87 MG/DL (ref 70–99)
GLUCOSE BLD-MCNC: 88 MG/DL (ref 70–99)
HCT VFR BLD CALC: 36.6 % (ref 36–48)
HEMOGLOBIN: 12.3 G/DL (ref 12–16)
LACTIC ACID: 0.9 MMOL/L (ref 0.4–2)
LACTIC ACID: 1 MMOL/L (ref 0.4–2)
LACTIC ACID: 1 MMOL/L (ref 0.4–2)
LYMPHOCYTES ABSOLUTE: 3.6 K/UL (ref 1–5.1)
LYMPHOCYTES RELATIVE PERCENT: 33 %
MCH RBC QN AUTO: 27.9 PG (ref 26–34)
MCHC RBC AUTO-ENTMCNC: 33.7 G/DL (ref 31–36)
MCV RBC AUTO: 82.8 FL (ref 80–100)
MONOCYTES ABSOLUTE: 0.7 K/UL (ref 0–1.3)
MONOCYTES RELATIVE PERCENT: 6.1 %
NEUTROPHILS ABSOLUTE: 6.6 K/UL (ref 1.7–7.7)
NEUTROPHILS RELATIVE PERCENT: 59.6 %
PDW BLD-RTO: 15.6 % (ref 12.4–15.4)
PERFORMED ON: ABNORMAL
PERFORMED ON: ABNORMAL
PERFORMED ON: NORMAL
PERFORMED ON: NORMAL
PLATELET # BLD: 340 K/UL (ref 135–450)
PMV BLD AUTO: 7.4 FL (ref 5–10.5)
POTASSIUM REFLEX MAGNESIUM: 4 MMOL/L (ref 3.5–5.1)
RBC # BLD: 4.42 M/UL (ref 4–5.2)
SODIUM BLD-SCNC: 142 MMOL/L (ref 136–145)
WBC # BLD: 11 K/UL (ref 4–11)

## 2022-09-26 PROCEDURE — 94760 N-INVAS EAR/PLS OXIMETRY 1: CPT

## 2022-09-26 PROCEDURE — C9113 INJ PANTOPRAZOLE SODIUM, VIA: HCPCS | Performed by: NURSE PRACTITIONER

## 2022-09-26 PROCEDURE — 6360000002 HC RX W HCPCS: Performed by: NURSE PRACTITIONER

## 2022-09-26 PROCEDURE — 36415 COLL VENOUS BLD VENIPUNCTURE: CPT

## 2022-09-26 PROCEDURE — APPSS15 APP SPLIT SHARED TIME 0-15 MINUTES: Performed by: PHYSICIAN ASSISTANT

## 2022-09-26 PROCEDURE — 83605 ASSAY OF LACTIC ACID: CPT

## 2022-09-26 PROCEDURE — 6370000000 HC RX 637 (ALT 250 FOR IP): Performed by: NURSE PRACTITIONER

## 2022-09-26 PROCEDURE — 1200000000 HC SEMI PRIVATE

## 2022-09-26 PROCEDURE — 99024 POSTOP FOLLOW-UP VISIT: CPT | Performed by: PHYSICIAN ASSISTANT

## 2022-09-26 PROCEDURE — 85025 COMPLETE CBC W/AUTO DIFF WBC: CPT

## 2022-09-26 PROCEDURE — 6370000000 HC RX 637 (ALT 250 FOR IP): Performed by: HOSPITALIST

## 2022-09-26 PROCEDURE — APPNB15 APP NON BILLABLE TIME 0-15 MINS: Performed by: PHYSICIAN ASSISTANT

## 2022-09-26 PROCEDURE — 80048 BASIC METABOLIC PNL TOTAL CA: CPT

## 2022-09-26 PROCEDURE — 2580000003 HC RX 258: Performed by: NURSE PRACTITIONER

## 2022-09-26 RX ORDER — OXYCODONE HYDROCHLORIDE AND ACETAMINOPHEN 5; 325 MG/1; MG/1
1 TABLET ORAL EVERY 4 HOURS PRN
Status: DISCONTINUED | OUTPATIENT
Start: 2022-09-26 | End: 2022-09-26

## 2022-09-26 RX ORDER — OXYCODONE HCL 10 MG/1
20 TABLET, FILM COATED, EXTENDED RELEASE ORAL EVERY 12 HOURS SCHEDULED
Status: DISCONTINUED | OUTPATIENT
Start: 2022-09-26 | End: 2022-09-29

## 2022-09-26 RX ORDER — OXYCODONE HYDROCHLORIDE AND ACETAMINOPHEN 5; 325 MG/1; MG/1
2 TABLET ORAL EVERY 4 HOURS PRN
Status: DISCONTINUED | OUTPATIENT
Start: 2022-09-26 | End: 2022-09-26

## 2022-09-26 RX ORDER — LORAZEPAM 0.5 MG/1
0.5 TABLET ORAL
Status: DISCONTINUED | OUTPATIENT
Start: 2022-09-26 | End: 2022-09-27

## 2022-09-26 RX ORDER — OXYCODONE HYDROCHLORIDE 5 MG/1
5 TABLET ORAL EVERY 4 HOURS PRN
Status: DISCONTINUED | OUTPATIENT
Start: 2022-09-26 | End: 2022-09-30 | Stop reason: HOSPADM

## 2022-09-26 RX ADMIN — GABAPENTIN 400 MG: 400 CAPSULE ORAL at 08:53

## 2022-09-26 RX ADMIN — DIPHENHYDRAMINE HYDROCHLORIDE 25 MG: 50 INJECTION, SOLUTION INTRAMUSCULAR; INTRAVENOUS at 12:58

## 2022-09-26 RX ADMIN — Medication 25 MG: at 18:25

## 2022-09-26 RX ADMIN — DIPHENHYDRAMINE HYDROCHLORIDE 25 MG: 50 INJECTION, SOLUTION INTRAMUSCULAR; INTRAVENOUS at 20:15

## 2022-09-26 RX ADMIN — Medication 40 MG: at 08:53

## 2022-09-26 RX ADMIN — HYDROXYZINE HYDROCHLORIDE 25 MG: 25 TABLET, FILM COATED ORAL at 20:02

## 2022-09-26 RX ADMIN — CEFEPIME 2000 MG: 2 INJECTION, POWDER, FOR SOLUTION INTRAVENOUS at 00:14

## 2022-09-26 RX ADMIN — OXYCODONE 5 MG: 5 TABLET ORAL at 11:05

## 2022-09-26 RX ADMIN — SODIUM CHLORIDE: 9 INJECTION, SOLUTION INTRAVENOUS at 07:04

## 2022-09-26 RX ADMIN — SODIUM CHLORIDE: 9 INJECTION, SOLUTION INTRAVENOUS at 18:28

## 2022-09-26 RX ADMIN — SODIUM CHLORIDE, PRESERVATIVE FREE 10 ML: 5 INJECTION INTRAVENOUS at 20:08

## 2022-09-26 RX ADMIN — OXYCODONE HYDROCHLORIDE 20 MG: 10 TABLET, FILM COATED, EXTENDED RELEASE ORAL at 20:01

## 2022-09-26 RX ADMIN — CEFEPIME 2000 MG: 2 INJECTION, POWDER, FOR SOLUTION INTRAVENOUS at 22:49

## 2022-09-26 RX ADMIN — Medication 25 MG: at 07:48

## 2022-09-26 RX ADMIN — GABAPENTIN 400 MG: 400 CAPSULE ORAL at 14:20

## 2022-09-26 RX ADMIN — ONDANSETRON 4 MG: 2 INJECTION INTRAMUSCULAR; INTRAVENOUS at 04:05

## 2022-09-26 RX ADMIN — CEFEPIME 2000 MG: 2 INJECTION, POWDER, FOR SOLUTION INTRAVENOUS at 11:07

## 2022-09-26 RX ADMIN — ATORVASTATIN CALCIUM 10 MG: 10 TABLET, FILM COATED ORAL at 08:53

## 2022-09-26 RX ADMIN — OXYCODONE 5 MG: 5 TABLET ORAL at 06:55

## 2022-09-26 RX ADMIN — ENOXAPARIN SODIUM 40 MG: 100 INJECTION SUBCUTANEOUS at 08:53

## 2022-09-26 RX ADMIN — GABAPENTIN 400 MG: 400 CAPSULE ORAL at 20:01

## 2022-09-26 ASSESSMENT — PAIN DESCRIPTION - ORIENTATION
ORIENTATION: LOWER;RIGHT
ORIENTATION: RIGHT;LOWER
ORIENTATION: RIGHT;LOWER

## 2022-09-26 ASSESSMENT — PAIN DESCRIPTION - LOCATION
LOCATION: ABDOMEN

## 2022-09-26 ASSESSMENT — PAIN DESCRIPTION - DESCRIPTORS
DESCRIPTORS: SHARP
DESCRIPTORS: ACHING;SHARP
DESCRIPTORS: ACHING

## 2022-09-26 ASSESSMENT — PAIN SCALES - GENERAL
PAINLEVEL_OUTOF10: 0
PAINLEVEL_OUTOF10: 8
PAINLEVEL_OUTOF10: 9
PAINLEVEL_OUTOF10: 0
PAINLEVEL_OUTOF10: 8

## 2022-09-26 ASSESSMENT — PAIN DESCRIPTION - ONSET: ONSET: ON-GOING

## 2022-09-26 ASSESSMENT — PAIN DESCRIPTION - PAIN TYPE: TYPE: ACUTE PAIN

## 2022-09-26 ASSESSMENT — PAIN - FUNCTIONAL ASSESSMENT: PAIN_FUNCTIONAL_ASSESSMENT: PREVENTS OR INTERFERES SOME ACTIVE ACTIVITIES AND ADLS

## 2022-09-26 ASSESSMENT — PAIN DESCRIPTION - FREQUENCY: FREQUENCY: CONTINUOUS

## 2022-09-26 NOTE — ED NOTES
Report called to 4N RN to assume care. All questions and concerns answered.      Jameson Garcia RN  09/25/22 2117

## 2022-09-26 NOTE — PLAN OF CARE
Problem: Discharge Planning  Goal: Discharge to home or other facility with appropriate resources  9/26/2022 1024 by Ghada Summers RN  Outcome: Progressing  Flowsheets (Taken 9/26/2022 2381)  Discharge to home or other facility with appropriate resources: Identify barriers to discharge with patient and caregiver     Problem: Pain  Goal: Verbalizes/displays adequate comfort level or baseline comfort level  9/26/2022 1024 by Ghada Summers RN  Outcome: Progressing   Pain /discomfort being managed with PRN analgesics per MD orders. Patient able to express presence and absence of pain and rate pain appropriately using numerical scale.      Problem: Safety - Adult  Goal: Free from fall injury  9/26/2022 1024 by Ghada Summers RN  Outcome: Progressing  Flowsheets (Taken 9/26/2022 0853)  Free From Fall Injury: Kashmir Hanson family/caregiver on patient safety     Problem: Chronic Conditions and Co-morbidities  Goal: Patient's chronic conditions and co-morbidity symptoms are monitored and maintained or improved  Outcome: Progressing  Flowsheets (Taken 9/26/2022 0853)  Care Plan - Patient's Chronic Conditions and Co-Morbidity Symptoms are Monitored and Maintained or Improved: Monitor and assess patient's chronic conditions and comorbid symptoms for stability, deterioration, or improvement

## 2022-09-26 NOTE — PROGRESS NOTES
Pt admitted to room 4257 from ER. Pt alert and oriented x 4, VSS. Orientation to room performed and instructions were provided for call light system. Call light and bedside table within pt reach. Will continue to monitor.

## 2022-09-26 NOTE — ED NOTES
Unable to obtain blood cultures prior to antibiotic administration. Lab notified for need of blood cultures drawn. States they will be up when they can.       Deepti Carrillo RN  09/25/22 3837

## 2022-09-26 NOTE — PROGRESS NOTES
Hospitalist Progress Note  9/26/2022 10:34 AM    PCP: Jose Loza CNP    [id number]     Date of Admission: 9/25/2022                                                                                                                     HOSPITAL COURSE    Patient demographics:  The patient  Krys Abrams is a 38 y.o. female     Significant past medical history:   Patient Active Problem List   Diagnosis    Attention deficit hyperactivity disorder (ADHD)     (ventriculoperitoneal) shunt status    Scoliosis    Prediabetes    Tobacco smoker    Onychomycosis    Congenital hydrocephalus (HCC)    Ureteritis    Acute cystitis without hematuria    History of brain shunt    Mood disorder ([facility])    Numbness and tingling in left hand    Diverticulosis    Colitis    Infection due to extended-spectrum beta-lactamase-producing Escherichia coli    Kidney stone    Intermittent small bowel obstruction due to adhesions ([facility])    Abdominal wall mass    Cyst and pseudocyst of pancreas    Abdominal pain    Nausea and vomiting    COVID         Presenting symptoms:  abdominal pain w/ fevers    Diagnostic workup:      CONSULTS DURING ADMISSION :   IP CONSULT TO GENERAL SURGERY  IP CONSULT TO HOSPITALIST  IP CONSULT TO GENERAL SURGERY      Patient was diagnosed with:  Abdominal pain  Nausea vomiting  COVID: positive        Treatment while inpatient:  Patient presented to the emergency department complaining of abdominal pain and fevers for 9 days.                                                                                        ----------------------------------------------------------      SUBJECTIVE COMPLAINTS- abdominal pain w/ fevers    Diet: Diet NPO Exceptions are: Sips of Clear Liquids      OBJECTIVE:   Patient Active Problem List   Diagnosis    Attention deficit hyperactivity disorder (ADHD)     (ventriculoperitoneal) shunt status    Scoliosis    Prediabetes    Tobacco smoker    Onychomycosis    Congenital hydrocephalus (HCC)    Ureteritis    Acute cystitis without hematuria    History of brain shunt    Mood disorder (HCC)    Numbness and tingling in left hand    Diverticulosis    Colitis    Infection due to extended-spectrum beta-lactamase-producing Escherichia coli    Kidney stone    Intermittent small bowel obstruction due to adhesions (HCC)    Abdominal wall mass    Cyst and pseudocyst of pancreas    Abdominal pain    Nausea and vomiting    COVID       Allergies  Bee venom, Bentyl [dicyclomine hcl], Dicyclomine, Ketorolac tromethamine, Levofloxacin, Maitake, Shiitake mushroom, Sulfa antibiotics, Vancomycin, Zosyn [piperacillin sod-tazobactam so], Adhesive tape, Oxycodone-acetaminophen, Sulfacetamide, Acetaminophen, Butalbital-apap-caff-cod, Ceftaroline, Daptomycin, Fosfomycin tromethamine, Haldol [haloperidol], Ketamine, Methocarbamol, Morphine, Nitrofurantoin, Prochlorperazine, Reglan [metoclopramide], Silicone, and Tazobactam    Medications    Scheduled Meds:   0.9 % sodium chloride  30 mL/kg IntraVENous Once    lidocaine 1 % injection  5 mL IntraDERmal Once    gabapentin  400 mg Oral TID    atorvastatin  10 mg Oral Daily    sodium chloride flush  5-40 mL IntraVENous 2 times per day    enoxaparin  40 mg SubCUTAneous Daily    pantoprazole (PROTONIX) 40 mg injection  40 mg IntraVENous Daily    cefepime  2,000 mg IntraVENous Q12H     Continuous Infusions:   sodium chloride      sodium chloride 125 mL/hr at 09/26/22 0705     PRN Meds:  oxyCODONE, hydrOXYzine HCl, sodium chloride flush, sodium chloride, ondansetron **OR** ondansetron, polyethylene glycol, acetaminophen **OR** acetaminophen, promethazine, diphenhydrAMINE    Vitals   Vitals /wt Patient Vitals for the past 8 hrs:   BP Temp Temp src Pulse Resp SpO2   09/26/22 0749 98/61 98.1 °F (36.7 °C) Oral 51 18 96 %   09/26/22 0655 -- -- -- -- 20 --   09/26/22 0504 112/72 -- -- 53 -- 97 %   09/26/22 0437 95/61 -- -- (!) 43 -- 95 %   09/26/22 0405 (!) 90/52 98.5 °F (36.9 °C) Oral (!) 46 16 96 %        72HR INTAKE/OUTPUT:    Intake/Output Summary (Last 24 hours) at 9/26/2022 1034  Last data filed at 9/26/2022 0705  Gross per 24 hour   Intake 913.43 ml   Output --   Net 913.43 ml       Exam:    Gen:   Alert and oriented ×3    Eyes: PERRL. No sclera icterus. No conjunctival injection. ENT: No discharge. Pharynx clear. External appearance of ears and nose normal.  Neck: Trachea midline. No obvious mass. Resp: No accessory muscle use. No crackles. No wheezes. No rhonchi. CV: Regular rate. Regular rhythm. No murmur or rub. No edema. GI: Non-tender. Non-distended. No hernia. Skin: Warm, dry, normal texture and turgor. Lymph: No cervical LAD. No supraclavicular LAD. M/S: / Ext. No cyanosis. No clubbing. No joint deformity. Neuro: CN 2-12 are intact,  no neurologic deficits noted. PT/INR: No results for input(s): PROTIME, INR in the last 72 hours. APTT: No results for input(s): APTT in the last 72 hours. CBC:   Recent Labs     09/25/22  1513 09/26/22  0700   WBC 14.8* 11.0   HGB 15.3 12.3   HCT 45.3 36.6   MCV 82.7 82.8    340       BMP:   Recent Labs     09/25/22  1513 09/26/22  0700    142   K 3.8 4.0    108   CO2 21 24   BUN 12 15   CREATININE 0.8 0.6       LIVER PROFILE:   Recent Labs     09/25/22  1513   ALKPHOS 171*   AST 17   ALT 34   BILITOT 0.4     No results for input(s): AMYLASE in the last 72 hours. Recent Labs     09/25/22  1513   LIPASE 17.0       UA:No results for input(s): NITRITE, LABCAST, WBCUA, RBCUA, MUCUS in the last 72 hours. TROPONIN: No results for input(s): Jacquelyn Nasuti in the last 72 hours. Lab Results   Component Value Date/Time    URRFLXCULT Not Indicated 09/25/2022 03:16 PM       No results for input(s): TSHREFLEX in the last 72 hours.     No components found for: [id number]  POC GLUCOSE:    Recent Labs     09/25/22  2211 09/26/22  0746   POCGLU 129* 81     No results for input(s): LABA1C in the last 72 hours. Lab Results   Component Value Date/Time    LABA1C 6.3 08/24/2022 06:22 AM         ASSESSMENT AND PLAN    Abdominal pain:   Status post lysis of adhesions and subcutaneous mass removal September 14, 2022,   known history of chronic abdominal pain. CT abdomen and pelvis with and without contrast unremarkable,  Consult to GI    Patient is afebrile. No hypotension. We will continue cefepime-> de-escalate if WBC and LA trend down, again presentation may simply be covid related       Nausea vomiting:   Has a history of chronic issues regarding nausea vomiting but also may be triggered by Covid  Phenergan and Benadryl IV  Pepcid IV and Protonix IV  IVF:  ml/hr  Clear liquids  Consult to GI     Chronic pain:   Continue Oxy IR add oxycontin  Despite patient stating that she has not tolerated her pain medication for the last 9 days there is no evidence or clinical indicators of withdrawal  no IV narcotics for this patient until there is a medical indication for such     COVID:   positive on 9/21: currently no respiratory symptoms. Code Status: Full Code        Dispo - cc        The patient and / or the family were informed of the results of any tests, a time was given to answer questions, a plan was proposed and they agreed with plan. Massimo Ace MD    This note was transcribed using [id number] [employer]. Please disregard any translational errors.

## 2022-09-26 NOTE — CONSULTS
GASTROENTEROLOGY INPATIENT CONSULTATION        IDENTIFYING DATA/REASON FOR CONSULTATION   PATIENT:  Ailyn Sandoval  MRN:  [id number]  ADMIT DATE: 9/25/2022  TIME OF EVALUATION: 9/26/2022 4:27 PM  HOSPITAL STAY:   LOS: 1 day     REASON FOR CONSULTATION:  abdominal pain    HISTORY OF PRESENT ILLNESS   Ailyn Sandoval is a 38 y.o. female with a PMH of ADHD, DM, PCOS,  shunt, chronic abdominal pain, cholecystectomy, hysterectomy, appendectomy who presented on 9/25/2022 with abdominal pain and fever. She recently underwent laparoscopic lysis of adhesions and removal of subcutaneous abdominal wall mass on 9/14/22. She tested positive for Covid on 9/21/22. She reports of lower abdominal pain as well as upper abdominal pain. She had nausea and vomiting prior to admission unable to keep pain meds down. She's had no vomiting today. She had diarrhea about a week ago. Stool was yellow with mucous. Per chart notes she's had multiple ED visits over past couple of months with similar complaint  (7/22 at [facility], 7/28 at [facility], 8/20 at [facility], 8/25 at [facility], 9/21 [facility], 9/22 at [facility]). She reports prior EGD and colonoscopy a couple of years ago. She is scheduled for EGD and colonoscopy with Dr. Dickson Otero with IV contrast shows no acute abnormalities. Alk phos 171 o/w LFTs normal        PAST MEDICAL, SURGICAL, FAMILY, and SOCIAL HISTORY     Past Medical History:   Diagnosis Date    ADHD (attention deficit hyperactivity disorder) 1988    Depression with anxiety     Diabetes mellitus ([facility] [address]     pre-diabetes    Difficult intubation     airway swelled up    Encounter for imaging to screen for metal prior to MRI 06/01/2021    MRI Conditional [employer] Non-Programmable shunt model#[id number] implanted 10/30/2020 at [facility]. Normal Mode. 1.5T or 3.0T. ESBL (extended spectrum beta-lactamase) producing bacteria infection 11/06/2019    urine    Functional ovarian cysts 2008    rt ovary cyst x 2 yrs. Headache(784.0)     migraines    History of blood transfusion     at birth    History of kidney stones     History of PCOS     had hysterectomy in 2016    Hydrocephalus ([address]     Hyperlipidemia     Irritable bowel syndrome 2004    had colonoscopy about 6 yrs ago    Meningitis     Neutrophilic leukocytosis     Nicotine dependence     PONV (postoperative nausea and vomiting)     very nauseated and sometimes wakes up with a Migraine--happened once after brain surgery    Primary osteoarthritis of left knee 2016    S/P cone biopsy of cervix 2004    Scoliosis 1990.s    Seizures ([address]     twice--last one in 2022     (ventriculoperitoneal) shunt status     hydrocephalus f/w Neurosurgeon at [facility]    Wears glasses     reading     Past Surgical History:   Procedure Laterality Date    ABDOMEN SURGERY N/A 2021    REMOVAL OF ABDOMINAL WALL MASS performed by Ananya Peraza MD at [facility]      appendicitis     SECTION      placenta previa    CHOLECYSTECTOMY      COLONOSCOPY      COLPOSCOPY      CSF SHUNT      replaced at age 17    CYSTOSCOPY      stone removal    HEMORRHOID SURGERY      HERNIA REPAIR Bilateral     inguinal    HERNIA REPAIR      hiatal hernia    HYSTERECTOMY, TOTAL ABDOMINAL (CERVIX REMOVED)  2016    TAHBSO, adhesions/PCOS    KNEE ARTHROSCOPY Left 2015    medial meniscectomy, chondroplasty, plica resection    LITHOTRIPSY Bilateral 12/10/2019    OTHER SURGICAL HISTORY  10/19/2016    op lap    VENTRAL HERNIA REPAIR N/A 2022    LAPAROSCOPIC LYSIS OF ADHESIONS AND ABDOMINAL WALL MASS REMOVAL performed by Ananya Peraza MD at [facility]      multiple revisions, most recent      Family History   Problem Relation Age of Onset    High Blood Pressure Mother     Diabetes Mother     High Cholesterol Mother     Depression Mother     Diabetes Maternal Grandmother     High Blood Pressure Maternal Grandmother     High Cholesterol Maternal Grandmother     Heart Disease Maternal Grandfather     High Blood Pressure Maternal Grandfather     Heart Disease Paternal Grandmother     Stroke Paternal Grandfather     Depression Sister     Cirrhosis Father     Rheum Arthritis Neg Hx     Osteoarthritis Neg Hx     Asthma Neg Hx     Breast Cancer Neg Hx     Cancer Neg Hx     Heart Failure Neg Hx     Hypertension Neg Hx     Migraines Neg Hx     Ovarian Cancer Neg Hx     Rashes/Skin Problems Neg Hx     Seizures Neg Hx     Thyroid Disease Neg Hx      Social History     Socioeconomic History    Marital status: Single     Spouse name: None    Number of children: None    Years of education: None    Highest education level: None   Occupational History    Occupation: disability, SSI    Tobacco Use    Smoking status: Every Day     Packs/day: 0.50     Years: 18.00     Pack years: 9.00     Types: Cigarettes    Smokeless tobacco: Never   Vaping Use    Vaping Use: Never used   Substance and Sexual Activity    Alcohol use: No     Alcohol/week: 0.0 standard drinks    Drug use: Never    Sexual activity: Not Currently     Partners: Male       MEDICATIONS   SCHEDULED:  oxyCODONE, 20 mg, 2 times per day  0.9 % sodium chloride, 30 mL/kg, Once  lidocaine 1 % injection, 5 mL, Once  gabapentin, 400 mg, TID  atorvastatin, 10 mg, Daily  sodium chloride flush, 5-40 mL, 2 times per day  enoxaparin, 40 mg, Daily  pantoprazole (PROTONIX) 40 mg injection, 40 mg, Daily  cefepime, 2,000 mg, Q12H      FLUIDS/DRIPS:     sodium chloride      sodium chloride 125 mL/hr at 09/26/22 0705     PRNs: oxyCODONE, 5 mg, Q4H PRN  hydrOXYzine HCl, 25 mg, Q8H PRN  sodium chloride flush, 5-40 mL, PRN  sodium chloride, , PRN  ondansetron, 4 mg, Q8H PRN   Or  ondansetron, 4 mg, Q6H PRN  polyethylene glycol, 17 g, Daily PRN  acetaminophen, 650 mg, Q6H PRN   Or  acetaminophen, 650 mg, Q6H PRN  promethazine, 25 mg, Q6H PRN  diphenhydrAMINE, 25 mg, Q6H PRN      ALLERGIES:  She Allergies   Allergen Reactions    Bee Venom Shortness Of Breath and Swelling    Bentyl [Dicyclomine Hcl] Shortness Of Breath and Anxiety    Dicyclomine Anxiety and Shortness Of Breath    Ketorolac Tromethamine Hives and Itching     Injectable only  Tolerates PO NSAIDs fine    Levofloxacin Anxiety and Hives    Maitake Anaphylaxis    Shiitake Mushroom Anaphylaxis     Can not eat mushrooms    Sulfa Antibiotics Shortness Of Breath    Vancomycin Anaphylaxis and Hives    Zosyn [Piperacillin Sod-Tazobactam So] Hives, Shortness Of Breath, Nausea Only and Dizziness or Vertigo    Adhesive Tape Dermatitis     Other reaction(s): Dermatitis    Oxycodone-Acetaminophen Nausea And Vomiting     Tolerates Norco/Vicodin ok  Patient can not tolerate Percocet well. Extreme vomiting      Sulfacetamide Hives    Acetaminophen Anxiety     Patient reports when taking acetaminophen (including Norco/Percocet) she gets severe anxiety when taking this medication. Butalbital-Apap-Caff-Cod Anxiety    Ceftaroline Rash    Daptomycin Swelling and Rash    Fosfomycin Tromethamine Nausea And Vomiting    Haldol [Haloperidol] Anxiety    Ketamine Nausea And Vomiting and Anxiety    Methocarbamol Rash    Morphine Hives    Nitrofurantoin Nausea And Vomiting     \"projectile vomiting\"    Prochlorperazine Anxiety    Reglan [Metoclopramide] Anxiety    Silicone Hives, Swelling and Rash    Tazobactam Nausea And Vomiting and Anxiety       REVIEW OF SYSTEMS   Pertinent ROS noted in HPI    PHYSICAL EXAM     Vitals:    09/26/22 0655 09/26/22 0749 09/26/22 1151 09/26/22 1529   BP:  98/61 (!) 92/51 (!) 85/50   Pulse:  51 50 66   Resp: 20 18 18 18   Temp:  98.1 °F (36.7 °C) 97.8 °F (36.6 °C) 99.3 °F (37.4 °C)   TempSrc:  Oral Oral Oral   SpO2:  96% 96% 96%   Weight:           No intake/output data recorded.       Physical Exam:  General appearance: alert, cooperative, no distress, appears stated age  Eyes: Anicteric  Head: Normocephalic, without obvious abnormality  Lungs: clear to auscultation bilaterally, Normal Effort  Heart: regular rate and rhythm, normal S1 and S2, no murmurs or rubs  Abdomen: soft, non-distended, non-tender. Bowel sounds normal. No masses,  no organomegaly. Extremities: atraumatic, no cyanosis or edema  Skin: warm and dry, no jaundice  Neuro: Grossly intact, A&OX3      LABS AND IMAGING   Laboratory   Recent Labs     09/25/22  1513 09/26/22  0700   WBC 14.8* 11.0   HGB 15.3 12.3   HCT 45.3 36.6   MCV 82.7 82.8    340     Recent Labs     09/25/22  1513 09/26/22  0700    142   K 3.8 4.0    108   CO2 21 24   BUN 12 15   CREATININE 0.8 0.6     Recent Labs     09/25/22  1513   AST 17   ALT 34   BILITOT 0.4   ALKPHOS 171*     Recent Labs     09/25/22  1513   LIPASE 17.0     No results for input(s): PROTIME, INR in the last 72 hours. Imaging  CT ABDOMEN PELVIS W IV CONTRAST Additional Contrast? None   Final Result   No acute abnormality. Stable tiny right renal stone. CT ABDOMEN PELVIS WO CONTRAST Additional Contrast? None   Final Result   No acute abnormality. Stable 2 mm right renal stone. XR CHEST (2 VW)   Final Result   No acute cardiopulmonary abnormality. ASSESSMENT AND RECOMMENDATIONS   38 y.o. female with a PMH of ADHD, DM, PCOS,  shunt, chronic abdominal pain, cholecystectomy, hysterectomy, appendectomy who presented on 9/25/2022 with abdominal pain and fever. She recently underwent laparoscopic lysis of adhesions and removal of subcutaneous abdominal wall mass on 9/14/22. She tested positive for Covid on 9/21/22. CT with no acute findings    IMPRESSION:  Abdominal pain, nausea, vomiting. Hx of chronic abd pain with multiple ED visit. CT A/P unremarkable. She is POD #12 of JOYCELYN and removal of subcutaneous abdominal wall mass.   Pt states she has an upcoming EGD and colonoscopy with Dr. Natha Shone infection       RECOMMENDATIONS:    Recommend supportive care  Could consider EGD/colonoscopy once recovered from Covid infection. If you have any questions or need any further information, please feel free to contact our consult team.  Thank you for allowing us to participate in the care of [address] [place] [address]. The note was completed using Dragon voice recognition transcription. Every effort was made to ensure accuracy; however, inadvertent transcription errors may be present despite my best efforts to edit errors.       Teresa Anna PA-C

## 2022-09-26 NOTE — CONSULTS
Surgery Consult Note     Boogie Sandoval PA-C  Pt Name: Ailyn Sandoval  MRN: [id number]  YOB: 1980  Date of evaluation: 9/26/2022  Primary Care Physician: FREDRICK Graves - CNP  Referring Physician Assistant: Temo Dozier PA-C  Reason for Consultation: abdominal pain, recent surgery   Chief Complaint:Abdominal pain  IMPRESSIONS:   COVID+  Abdominal pain. Secondary to nausea/emesis and being unable to keep anything down, including pain medication  S/P 9/14/22 laparoscopic lysis of adhesions, and removal of subcutaneous abdominal wall mass  Leukocytosis improved: 14.8-->11.0  PLANS:   Monitor and control pain  IVF  OK to ADAT from a general surgery standpoint  No general surgery needs at this time  Once able to tolerate a diet and pain controlled, ok to D/C back home from a general surgery standpoint. SUBJECTIVE:   History of Chief Complaint:    Ailyn Sandoval is a 38 y.o. female who presents with abdominal pain. She recently, 9/14/22, underwent a laparoscopic lysis of adhesions, and removal of subcutaneous abdominal wall mass and was discharged home after recovering. After being at home for 2 days she was found to have COVID and started having nausea and emesis. She had been unable to keep anything down, including her pain medication so she came back to the hospital. She had a CT scan performed yesterday and that showed no acute abnormalities. She denies having any other pain at this time.   Past Medical History  Reviewed  has a past medical history of ADHD (attention deficit hyperactivity disorder), Depression with anxiety, Diabetes mellitus ([address] Difficult intubation, Encounter for imaging to screen for metal prior to MRI, ESBL (extended spectrum beta-lactamase) producing bacteria infection, Functional ovarian cysts, Headache(784.0), History of blood transfusion, History of kidney stones, History of PCOS, Hydrocephalus ([address] Hyperlipidemia, Irritable bowel syndrome, Meningitis, Neutrophilic leukocytosis, Nicotine dependence, PONV (postoperative nausea and vomiting), Primary osteoarthritis of left knee, S/P cone biopsy of cervix, Scoliosis, Seizures ([facility] [address]  (ventriculoperitoneal) shunt status, and Wears glasses. Past Surgical History  Reviewed has a past surgical history that includes Appendectomy (); Colonoscopy (); Colposcopy ();  section (); csf shunt; Cystoscopy; Knee arthroscopy (Left, 2015); Cholecystectomy; other surgical history (10/19/2016); Ventriculoperitoneal shunt; Hysterectomy, total abdominal (2016); Lithotripsy (Bilateral, 12/10/2019); hernia repair (Bilateral, ); hernia repair (); Hemorrhoid surgery; Abdomen surgery (N/A, 2021); and ventral hernia repair (N/A, 2022). Medications  Prior to Admission medications    Medication Sig Start Date End Date Taking?  Authorizing Provider   brompheniramine-pseudoephedrine-DM 2-30-10 MG/5ML syrup Take 5 mLs by mouth 4 times daily as needed for Cough or Congestion 22   FREDRICK Cobos CNP   albuterol sulfate HFA (VENTOLIN HFA) 108 (90 Base) MCG/ACT inhaler Inhale 2 puffs into the lungs 4 times daily as needed for Wheezing 22   FREDRICK Cobos CNP   methylPREDNISolone (MEDROL DOSEPACK) 4 MG tablet Take by mouth. 22  FREDRICK Cobos CNP   hydrOXYzine HCl (ATARAX) 25 MG tablet Take 1 tablet by mouth every 8 hours as needed for Anxiety 9/23/22 10/3/22  FREDRICK Cobos CNP   benzonatate (TESSALON) 100 MG capsule Take 1 capsule by mouth 3 times daily as needed for Cough 22  Regine Paulino MD   ondansetron (ZOFRAN ODT) 4 MG disintegrating tablet Take 1 tablet by mouth 4 times daily as needed for Nausea or Vomiting 22  Regine Paulino MD   promethazine (PROMETHEGAN) 25 MG suppository Place 1 suppository rectally every 6 hours as needed for Nausea 22  Regine Paulino MD   VYVANSE 50 MG capsule Take 1 capsule by mouth every morning for 30 days. Take 50 mg by mouth every morning. Patient taking differently: Take 50 mg by mouth every morning. 9/8/22 10/8/22  FREDRICK Winchester - CNP   simvastatin (ZOCOR) 10 MG tablet Take 10 mg by mouth daily    Historical Provider, MD   gabapentin (NEURONTIN) 400 MG capsule Take 400 mg by mouth 3 times daily. Historical Provider, MD   oxyCODONE (ROXICODONE) 5 MG immediate release tablet Take 5 mg by mouth every 6 hours as needed for Pain.     Historical Provider, MD    Scheduled Meds:   0.9 % sodium chloride  30 mL/kg IntraVENous Once    lidocaine 1 % injection  5 mL IntraDERmal Once    gabapentin  400 mg Oral TID    atorvastatin  10 mg Oral Daily    sodium chloride flush  5-40 mL IntraVENous 2 times per day    enoxaparin  40 mg SubCUTAneous Daily    pantoprazole (PROTONIX) 40 mg injection  40 mg IntraVENous Daily    cefepime  2,000 mg IntraVENous Q12H     Continuous Infusions:   sodium chloride      sodium chloride 125 mL/hr at 09/26/22 0705     PRN Meds:.oxyCODONE, hydrOXYzine HCl, sodium chloride flush, sodium chloride, ondansetron **OR** ondansetron, polyethylene glycol, acetaminophen **OR** acetaminophen, promethazine, diphenhydrAMINE  Allergies  is allergic to bee venom, bentyl [dicyclomine hcl], dicyclomine, ketorolac tromethamine, levofloxacin, maitake, shiitake mushroom, sulfa antibiotics, vancomycin, zosyn [piperacillin sod-tazobactam so], adhesive tape, oxycodone-acetaminophen, sulfacetamide, acetaminophen, butalbital-apap-caff-cod, ceftaroline, daptomycin, fosfomycin tromethamine, haldol [haloperidol], ketamine, methocarbamol, morphine, nitrofurantoin, prochlorperazine, reglan [metoclopramide], silicone, and tazobactam.  Family History  Reviewedfamily history includes Cirrhosis in her father; Depression in her mother and sister; Diabetes in her maternal grandmother and mother; Heart Disease in her maternal grandfather and paternal grandmother; High Blood Pressure in her maternal grandfather, maternal grandmother, and mother; High Cholesterol in her maternal grandmother and mother; Stroke in her paternal grandfather. Social History   reports that she has been smoking cigarettes. She has a 9.00 pack-year smoking history. She has never used smokeless tobacco. She reports that she does not drink alcohol and does not use drugs. EDUCATION  Patient educated about their illness/diagnosis, stated above, and all questions answered. We discussed the importance of nutrition, medications they are taking, and healthy lifestyle. Review of Systems:  General Denies any fever or chills  HEENT Denies any diplopia, tinnitus or vertigo  Resp Denies any shortness of breath, cough or wheezing  Cardiac Denies any chest pain, palpitations, claudication or edema  GI Denies any melena, hematochezia, hematemesis or pyrosis   Denies any frequency, urgency, hesitancy or incontinence  Heme Denies bruising or bleeding easily  Neuro Denies any focal motor or sensory deficits  OBJECTIVE:   VITALS:  weight is 151 lb 10.8 oz (68.8 kg). Her oral temperature is 98.1 °F (36.7 °C). Her blood pressure is 98/61 and her pulse is 51. Her respiration is 18 and oxygen saturation is 96%. CONSTITUTIONAL: Alert and oriented times 3, no acute distress and cooperative to examination with proper mood and affect. SKIN: Skin color, texture, turgor normal. No rashes or lesions. LYMPH: no cervical nodes, no inguinal nodes  HEENT: Head is normocephalic, atraumatic. EOMI, PERRLA. NECK: Supple, symmetrical, trachea midline, no adenopathy, thyroid symmetric, not enlarged and no tenderness, skin normal.  CHEST/LUNGS: chest symmetric with normal A/P diameter, normal respiratory rate and rhythm, lungs clear to auscultation without wheezes, rales or rhonchi. No accessory muscle use. Scars None   CARDIOVASCULAR: Heart sounds are normal.  Regular rate and rhythm without murmur, gallop or rub. Normal S1 and S2. . Carotid and femoral pulses 2+/4 and equal bilaterally. ABDOMEN: Normal shape. No and Laparoscopic scar(s) present. Normal bowel sounds. No bruits. . soft, nontender, nondistended, no masses or organomegaly. no evidence of hernia. Percussion: Normal without hepatosplenomegally. Tenderness: absent. RECTAL: deferred, not clinically indicated  NEUROLOGIC: There are no focalizing motor or sensory deficits. CN II-XII are grossly intact. Valri Dykes EXTREMITIES: no cyanosis, no clubbing, and no edema.   LABS:     Recent Labs     09/25/22  1513 09/25/22  1516 09/26/22  0700   WBC 14.8*  --  11.0   HGB 15.3  --  12.3   HCT 45.3  --  36.6     --  340     --  142   K 3.8  --  4.0     --  108   CO2 21  --  24   BUN 12  --  15   CREATININE 0.8  --  0.6   CALCIUM 9.8  --  8.5   AST 17  --   --    ALT 34  --   --    BILITOT 0.4  --   --    NITRU  --  Negative  --    COLORU  --  Yellow  --      Recent Labs     09/25/22  1513   ALKPHOS 171*   ALT 34   AST 17   BILITOT 0.4   LABALBU 5.1*   LIPASE 17.0     CBC:   Lab Results   Component Value Date/Time    WBC 11.0 09/26/2022 07:00 AM    RBC 4.42 09/26/2022 07:00 AM    HGB 12.3 09/26/2022 07:00 AM    HCT 36.6 09/26/2022 07:00 AM    MCV 82.8 09/26/2022 07:00 AM    MCH 27.9 09/26/2022 07:00 AM    MCHC 33.7 09/26/2022 07:00 AM    RDW 15.6 09/26/2022 07:00 AM     09/26/2022 07:00 AM    MPV 7.4 09/26/2022 07:00 AM     CMP:    Lab Results   Component Value Date/Time     09/26/2022 07:00 AM    K 4.0 09/26/2022 07:00 AM     09/26/2022 07:00 AM    CO2 24 09/26/2022 07:00 AM    BUN 15 09/26/2022 07:00 AM    CREATININE 0.6 09/26/2022 07:00 AM    GFRAA >60 09/26/2022 07:00 AM    GFRAA >60 10/08/2011 10:35 PM    AGRATIO 1.8 09/25/2022 03:13 PM    LABGLOM >60 09/26/2022 07:00 AM    GLUCOSE 87 09/26/2022 07:00 AM    PROT 7.9 09/25/2022 03:13 PM    PROT 7.3 08/14/2011 07:30 PM    LABALBU 5.1 09/25/2022 03:13 PM    CALCIUM 8.5 09/26/2022 07:00 AM    BILITOT 0.4 09/25/2022 03:13 PM    ALKPHOS 171 09/25/2022 03:13 PM    AST 17 09/25/2022 03:13 PM    ALT 34 09/25/2022 03:13 PM     Urine Culture:  No components found for: CURINE  Blood Culture:  No components found for: CBLOOD, CFUNGUSBL  RADIOLOGY:     CT scan abd/pelvis (9/25/22): No acute abnormality. Stable tiny right renal stone. Thank you for the interesting evaluation. Further recommendations to follow. [facility]  General and Vascular Surgery [phone]  Electronically signed by Amadeo Rich PA-C on 9/26/2022 at 10:28 AM      As above  CT noted, no dilated bowel or postop issues  Start PO  No surgical plans at this time    Electronically signed by Darrell Claude, MD on 9/26/2022 at 2:40 PM

## 2022-09-26 NOTE — TELEPHONE ENCOUNTER
Patient called on-call provider on 09/25/2022 with multiple complaints. She reported recent surgery, right sided abdominal swelling, fever, covid +, kidney pain, nausea. She stated the medications were not helping. This provider instructed her to go to the ER for further evaluation. 29-Oct-2018 16:35

## 2022-09-26 NOTE — PROGRESS NOTES
Arrived to place PICC for pt with poor access options. Pt arms are bruised and tender to the touch from prior PIV attempts. Bedside RN Agnieszka Peraza completed timeout in the room verifying pt, procedure and consent. Double Lumen PICC was placed in the Left Cephalic Vein with minimal resistance. 3CG verified tip location. Brisk blood return achieved and flushed without resistance. Pt tolerated the procedure well. Handoff completed with SHANA Peraza. Thank you for allowing our care and services. Line is good to use at this time.

## 2022-09-26 NOTE — H&P
Hospital Medicine History & Physical      PCP: Ginny Dillard, APRN - CNP    Date of Admission: 9/25/2022    Date of Service: Pt seen/examined on 9/25/2022 and Admitted to Inpatient        Chief Complaint:  abdominal pain w/ fevers      History Of Present Illness: The patient is a 38 y.o. female who presents to [facility] with PMHx reviewed and listed below    No anticoagulation therapy  Lives at home  CODE STATUS full  Pain management: Prescription Vyvanse, gabapentin and Oxy 5 mg    Patient presented to the emergency department complaining of abdominal pain and fevers for 9 days. Abdominal laparoscopic surgery September 14 for lysis of adhesions and a subcutaneous abdominal mass removed per Dr. Gail Mcdonald. Was actually dx with COVID 4 days ago in the ED. Patient reports a T-max of 103 at 7:00 this morning. She also reports that she has not been able to tolerate any of her medications. She reports abdominal pain mid suprapubic radiating to the right lower quadrant and right upper quadrant. Negative for hematic emesis or bilious vomiting. Negative for diarrhea. She would not give me a date of when her last bowel movement was. COVID +: ED visit: 9/21 and 9/22, Then on 9/23 pcp called in symptomatic tx meds: Bromfed-DM, albuterol inhaler, steroids. She had already received prescriptions for Phenergan and Tessalon Perles. I think this is interesting because the patient told me that she did not feel badly and did not even know that she had COVID illness but she has 2 ED visits and then had called her PCP for supportive medications. And when I reviewed her medications with her she asked me not to renew those medications for hospital course because she did not even take them.       Hospital admission 8/29 through 9/2: Admitted for acute cystitis without pyelonephritis. Urine grew ESBL E. coli and her antibiotic therapy was changed to meropenem. ID was consulted. ID did not feel like the patient had acute cystitis. The patient was discharged home without antibiotics. Also treated for nausea vomiting. Complained of diffuse abdominal pain and the CT work-up during that visit was negative for pathology. There was concern that there was drug-seeking behavior. Patient had communicated to the attending provider that she had a prescription for Valium. However did not show up on the OARRS report and when the pharmacy was called there was no prescription for the patient. During my conversation with the patient she said to me that she had a prescription for Ativan waiting at the pharmacy. Supposedly it was prescribed to her over a month ago by her psychiatrist.  However she states that she had not picked it up. OARRS report does not indicate that there had been an Ativan prescription pickup. I think it is interesting that she describes this timeframe for the Ativan the same time that she was reporting that she also had prescription for Valium. ED work-up: Leukocytosis 14.8 with a left shift of 12.3. Initial heart rate 102 and subsequent heart rates were within normal range. No hypotension. Afebrile with a temperature 98.2. Lactic acid 2.2. Lipase 17. Metabolic panel completely unremarkable. CT abdomen and pelvis with and without contrast she has a stable right renal cyst that has not changed, outside of that unremarkable. General surgery was consulted: Dr. Itz Paredes spoken with he advised admission and they will follow. Patient was started on cefepime. On my exam patient does not appear to be distressed. She was informed that there was no medical indication for IV narcotics such as Dilaudid. And that we would be addressing her nausea and vomiting with the Phenergan and Benadryl, will also add IV Pepcid and Protonix.   I have informed her that general surgery will reevaluate her, and we will monitor her labs and continue the antibiotics cefepime. Chest is clear abdomen is soft. The laparoscopic sites are unremarkable. She has Steri-Strips at the suprapubic region. Abdomen is soft, bowel sounds throughout. No rebound or guarding. No diaphoresis. Central peripheral pulses are strong and regular. Past Medical History:        Diagnosis Date    ADHD (attention deficit hyperactivity disorder)     Depression with anxiety     Diabetes mellitus ([address]     pre-diabetes    Difficult intubation     airway swelled up    Encounter for imaging to screen for metal prior to MRI 2021    MRI Conditional Medtronic Non-Programmable shunt model#[id number] implanted 10/30/2020 at [facility]. Normal Mode. 1.5T or 3.0T. ESBL (extended spectrum beta-lactamase) producing bacteria infection 2019    urine    Functional ovarian cysts 2008    rt ovary cyst x 2 yrs.     Headache(784.0)     migraines    History of blood transfusion     at birth    History of kidney stones     History of PCOS     had hysterectomy in 2016    Hydrocephalus [facility])     Hyperlipidemia     Irritable bowel syndrome 2004    had colonoscopy about 6 yrs ago    Meningitis     Neutrophilic leukocytosis     Nicotine dependence     PONV (postoperative nausea and vomiting)     very nauseated and sometimes wakes up with a Migraine--happened once after brain surgery    Primary osteoarthritis of left knee 2016    S/P cone biopsy of cervix 2004    Scoliosis .s    Seizures ([address]     twice--last one in 2022     (ventriculoperitoneal) shunt status     hydrocephalus f/w Neurosurgeon at [facility]    Wears glasses     reading       Past Surgical History:        Procedure Laterality Date    ABDOMEN SURGERY N/A 2021    REMOVAL OF ABDOMINAL WALL MASS performed by Anatoliy Holland MD at [facility]  2003    appendicitis     SECTION  2005    placenta previa    CHOLECYSTECTOMY      COLONOSCOPY  2004    COLPOSCOPY  2004    CSF SHUNT      replaced at age 17    CYSTOSCOPY      stone removal    HEMORRHOID SURGERY      HERNIA REPAIR Bilateral 1988    inguinal    HERNIA REPAIR  2015    hiatal hernia    HYSTERECTOMY, TOTAL ABDOMINAL (CERVIX REMOVED)  11/09/2016    TAHBSO, adhesions/PCOS    KNEE ARTHROSCOPY Left 1/7/2015    medial meniscectomy, chondroplasty, plica resection    LITHOTRIPSY Bilateral 12/10/2019    OTHER SURGICAL HISTORY  10/19/2016    op lap    VENTRAL HERNIA REPAIR N/A 9/14/2022    LAPAROSCOPIC LYSIS OF ADHESIONS AND ABDOMINAL WALL MASS REMOVAL performed by Hubert Khanna MD at [address] [place] [address]      multiple revisions, most recent 2015       Medications Prior to Admission:    Prior to Admission medications    Medication Sig Start Date End Date Taking?  Authorizing Provider   brompheniramine-pseudoephedrine-DM 2-30-10 MG/5ML syrup Take 5 mLs by mouth 4 times daily as needed for Cough or Congestion 9/23/22   Luigirol Alvine, APRN - CNP   albuterol sulfate HFA (VENTOLIN HFA) 108 (90 Base) MCG/ACT inhaler Inhale 2 puffs into the lungs 4 times daily as needed for Wheezing 9/23/22   Luigirol Alvine, APRN - CNP   methylPREDNISolone (MEDROL DOSEPACK) 4 MG tablet Take by mouth. 9/23/22 9/29/22  Kay Lewis, APRN - CNP   hydrOXYzine HCl (ATARAX) 25 MG tablet Take 1 tablet by mouth every 8 hours as needed for Anxiety 9/23/22 10/3/22  Kay Lewis, APRN - CNP   benzonatate (TESSALON) 100 MG capsule Take 1 capsule by mouth 3 times daily as needed for Cough 9/21/22 9/28/22  Geeta Hayden MD   ondansetron (ZOFRAN ODT) 4 MG disintegrating tablet Take 1 tablet by mouth 4 times daily as needed for Nausea or Vomiting 9/21/22 9/26/22  Geeta Hayden MD   promethazine (PROMETHEGAN) 25 MG suppository Place 1 suppository rectally every 6 hours as needed for Nausea 9/21/22 9/28/22  Geeta Hayden MD   VYVANSE 50 MG capsule Take 1 capsule by mouth every morning for 30 days. Take 50 mg by mouth every morning. Patient taking differently: Take 50 mg by mouth every morning. 9/8/22 10/8/22  FREDRICK Graves CNP   simvastatin (ZOCOR) 10 MG tablet Take 10 mg by mouth daily    Historical Provider, MD   gabapentin (NEURONTIN) 400 MG capsule Take 400 mg by mouth 3 times daily. Historical Provider, MD   oxyCODONE (ROXICODONE) 5 MG immediate release tablet Take 5 mg by mouth every 6 hours as needed for Pain. Historical Provider, MD       Allergies:  Bee venom, Bentyl [dicyclomine hcl], Dicyclomine, Ketorolac tromethamine, Levofloxacin, Maitake, Shiitake mushroom, Sulfa antibiotics, Vancomycin, Zosyn [piperacillin sod-tazobactam so], Adhesive tape, Oxycodone-acetaminophen, Sulfacetamide, Acetaminophen, Butalbital-apap-caff-cod, Ceftaroline, Daptomycin, Fosfomycin tromethamine, Haldol [haloperidol], Ketamine, Methocarbamol, Morphine, Nitrofurantoin, Prochlorperazine, Reglan [metoclopramide], Silicone, and Tazobactam    Social History:  The patient currently lives at home. TOBACCO:   reports that she has been smoking cigarettes. She has a 9.00 pack-year smoking history. She has never used smokeless tobacco.  ETOH:   reports no history of alcohol use. Family History:  Reviewed in detail and negative for DM, Early CAD, Cancer, CVA.  Positive as follows:        Problem Relation Age of Onset    High Blood Pressure Mother     Diabetes Mother     High Cholesterol Mother     Depression Mother     Diabetes Maternal Grandmother     High Blood Pressure Maternal Grandmother     High Cholesterol Maternal Grandmother     Heart Disease Maternal Grandfather     High Blood Pressure Maternal Grandfather     Heart Disease Paternal Grandmother     Stroke Paternal Grandfather     Depression Sister     Cirrhosis Father     Rheum Arthritis Neg Hx     Osteoarthritis Neg Hx     Asthma Neg Hx     Breast Cancer Neg Hx     Cancer Neg Hx     Heart Failure Neg Hx Hypertension Neg Hx     Migraines Neg Hx     Ovarian Cancer Neg Hx     Rashes/Skin Problems Neg Hx     Seizures Neg Hx     Thyroid Disease Neg Hx        REVIEW OF SYSTEMS:  and as noted in the HPI. All other systems reviewed and negative. PHYSICAL EXAM:    BP 95/61   Pulse 65   Temp 99.1 °F (37.3 °C) (Oral)   Resp 16   Wt 151 lb 10.8 oz (68.8 kg)   LMP 10/31/2016 (Exact Date)   SpO2 95%   BMI 30.63 kg/m²     General appearance: No apparent distress appears stated age and cooperative. HEENT Normal cephalic, atraumatic without obvious deformity. Pupils equal, round, and reactive to light. Extra ocular muscles intact. Conjunctivae/corneas clear. Neck: Supple, No jugular venous distention/bruits. Trachea midline without thyromegaly or adenopathy with full range of motion. Lungs: Clear to auscultation, bilaterally without Rales/Wheezes/Rhonchi with good respiratory effort. Heart: Regular rate and rhythm with Normal S1/S2 without murmurs, rubs or gallops, point of maximum impulse non-displaced  Abdomen: Soft, non-tender or non-distended without rigidity or guarding and positive bowel sounds all four quadrants. Laparoscopic surgical sites unremarkable. Steri-Strips remain in place mid lower abdomen. Extremities: No clubbing, cyanosis, or edema bilaterally. Full range of motion without deformity and normal gait intact. Skin: Skin color, texture, turgor normal.  No rashes or lesions. Neurologic: Alert and oriented X 3, neurovascularly intact with sensory/motor intact upper extremities/lower extremities, bilaterally. Cranial nerves: II-XII intact, grossly non-focal.  Mental status: Alert, oriented, thought content appropriate.   Capillary Refill: Acceptable  < 3 seconds  Peripheral Pulses: +3 Easily felt, not easily obliterated with pressure      CXR:  I have reviewed the CXR with the following interpretation: Chest x-ray negative  EKG:  I have reviewed the EKG with the following interpretation: N/A    CBC   Recent Labs     09/25/22  1513   WBC 14.8*   HGB 15.3   HCT 45.3         RENAL  Recent Labs     09/25/22  1513      K 3.8      CO2 21   BUN 12   CREATININE 0.8     LFT'S  Recent Labs     09/25/22  1513   AST 17   ALT 34   BILITOT 0.4   ALKPHOS 171*     COAG  No results for input(s): INR in the last 72 hours. CARDIAC ENZYMES  No results for input(s): CKTOTAL, CKMB, CKMBINDEX, TROPONINI in the last 72 hours. U/A:    Lab Results   Component Value Date/Time    NITRITE neg 10/25/2019 12:54 PM    COLORU Yellow 09/25/2022 03:16 PM    WBCUA 8 09/21/2022 10:38 AM    RBCUA 0-2 09/21/2022 10:38 AM    MUCUS 1+ 02/19/2020 12:42 AM    BACTERIA 4+ 09/21/2022 10:38 AM    CLARITYU Clear 09/25/2022 03:16 PM    SPECGRAV 1.007 09/25/2022 03:16 PM    LEUKOCYTESUR Negative 09/25/2022 03:16 PM    BLOODU Negative 09/25/2022 03:16 PM    GLUCOSEU Negative 09/25/2022 03:16 PM    GLUCOSEU NEGATIVE 10/08/2011 10:10 PM    AMORPHOUS Rare 03/21/2017 06:53 PM       ABG  No results found for: PEB0JOL, BEART, E0MFJPGW, PHART, THGBART, QMV4GJK, PO2ART, FCJ1LNH        Active Hospital Problems    Diagnosis Date Noted    Abdominal pain [R10.9] 09/25/2022     Priority: Medium    Nausea and vomiting [R11.2] 09/25/2022     Priority: Medium    COVID [U07.1] 09/25/2022     Priority: Medium           PHYSICIANS CERTIFICATION:    I certify that Alberto Montes is expected to be hospitalized for more than 2 midnights based on the following assessment and plan:      ASSESSMENT/PLAN:  Abdominal pain: Status post lysis of adhesions and subcutaneous mass removal September 14, 2022, also known history of chronic abdominal pain. CT abdomen and pelvis with and without contrast unremarkable, no acute findings  Leukocytosis 14.8, and lactic acid 2.2-> this could be influenced by nausea vomiting and COVID-> will trend  Patient is afebrile. No hypotension.   We will continue cefepime-> de-escalate if WBC and LA trend down, again presentation may simply be covid related  GEN surgery consulted in the ED-> will follow on admission  ED visits:   7/22/2022: [facility] ED: abdominal pain  7/28/2022: [facility] ED: abdominal pain  8/20/2022: [facility] ED: abdominal pain  8/25/2022: [facility] ED: cystitis  [facility], N[facility]V  [facility]: COVID +  [facility]22/20[facility]: vomiting, diarrhea    Nausea vomiting: Has a history of chronic issues regarding nausea vomiting but also may be triggered by Covid  Phenergan and Benadryl IV  Pepcid IV and Protonix IV  IVF:  ml/hr  Clear liquidsMay need to consider GI consult, not sure if the patient has been tested for H. pylori    Chronic pain: Continue Oxy   Despite patient stating that she has not tolerated her pain medication for the last 9 days there is no evidence or clinical indicators of withdrawal  no IV narcotics for this patient until there is a medical indication for such    COVID: positive on 9/21: currently no respiratory symptoms. GI distress is likely being influenced by this and also influencing the leukocytosis and LA  Sx GI management    DVT Prophylaxis: Lovenox  Diet: Diet NPO Exceptions are: Sips of Clear Liquids  Code Status: Full Code  PT/OT Eval Status: Independent    Dispo -admit, inpatient       Uma FREDRICK Fuentes CNP    Thank you FREDRICK Yost CNP for the opportunity to be involved in this patient's care. If you have any questions or concerns please feel free to contact me at [phone]. This dictation was performed with a verbal recognition program (DRAGON) and it was checked for errors. It is possible that there are still dictated errors within this office note. If so, please bring any errors to my attention for an addendum. All efforts were made to ensure that this office note is accurate.

## 2022-09-27 ENCOUNTER — APPOINTMENT (OUTPATIENT)
Dept: MRI IMAGING | Age: 42
DRG: 720 | End: 2022-09-27
Payer: COMMERCIAL

## 2022-09-27 LAB
ANION GAP SERPL CALCULATED.3IONS-SCNC: 10 MMOL/L (ref 3–16)
BUN BLDV-MCNC: 20 MG/DL (ref 7–20)
CALCIUM SERPL-MCNC: 8.5 MG/DL (ref 8.3–10.6)
CHLORIDE BLD-SCNC: 106 MMOL/L (ref 99–110)
CO2: 24 MMOL/L (ref 21–32)
CREAT SERPL-MCNC: 0.8 MG/DL (ref 0.6–1.1)
GFR AFRICAN AMERICAN: >60
GFR NON-AFRICAN AMERICAN: >60
GLUCOSE BLD-MCNC: 100 MG/DL (ref 70–99)
GLUCOSE BLD-MCNC: 107 MG/DL (ref 70–99)
GLUCOSE BLD-MCNC: 110 MG/DL (ref 70–99)
GLUCOSE BLD-MCNC: 122 MG/DL (ref 70–99)
GLUCOSE BLD-MCNC: 95 MG/DL (ref 70–99)
HCT VFR BLD CALC: 35.3 % (ref 36–48)
HEMOGLOBIN: 11.8 G/DL (ref 12–16)
MCH RBC QN AUTO: 28.1 PG (ref 26–34)
MCHC RBC AUTO-ENTMCNC: 33.5 G/DL (ref 31–36)
MCV RBC AUTO: 84 FL (ref 80–100)
PDW BLD-RTO: 16 % (ref 12.4–15.4)
PERFORMED ON: ABNORMAL
PLATELET # BLD: 278 K/UL (ref 135–450)
PMV BLD AUTO: 7.5 FL (ref 5–10.5)
POTASSIUM SERPL-SCNC: 3.8 MMOL/L (ref 3.5–5.1)
RBC # BLD: 4.2 M/UL (ref 4–5.2)
SODIUM BLD-SCNC: 140 MMOL/L (ref 136–145)
WBC # BLD: 9.7 K/UL (ref 4–11)

## 2022-09-27 PROCEDURE — 99024 POSTOP FOLLOW-UP VISIT: CPT | Performed by: PHYSICIAN ASSISTANT

## 2022-09-27 PROCEDURE — 6360000002 HC RX W HCPCS: Performed by: NURSE PRACTITIONER

## 2022-09-27 PROCEDURE — 6370000000 HC RX 637 (ALT 250 FOR IP): Performed by: NURSE PRACTITIONER

## 2022-09-27 PROCEDURE — 74181 MRI ABDOMEN W/O CONTRAST: CPT

## 2022-09-27 PROCEDURE — APPNB15 APP NON BILLABLE TIME 0-15 MINS: Performed by: PHYSICIAN ASSISTANT

## 2022-09-27 PROCEDURE — 85027 COMPLETE CBC AUTOMATED: CPT

## 2022-09-27 PROCEDURE — 6360000002 HC RX W HCPCS: Performed by: HOSPITALIST

## 2022-09-27 PROCEDURE — APPSS15 APP SPLIT SHARED TIME 0-15 MINUTES: Performed by: PHYSICIAN ASSISTANT

## 2022-09-27 PROCEDURE — 1200000000 HC SEMI PRIVATE

## 2022-09-27 PROCEDURE — 6370000000 HC RX 637 (ALT 250 FOR IP): Performed by: HOSPITALIST

## 2022-09-27 PROCEDURE — C9113 INJ PANTOPRAZOLE SODIUM, VIA: HCPCS | Performed by: NURSE PRACTITIONER

## 2022-09-27 PROCEDURE — 80048 BASIC METABOLIC PNL TOTAL CA: CPT

## 2022-09-27 PROCEDURE — 2580000003 HC RX 258: Performed by: NURSE PRACTITIONER

## 2022-09-27 RX ORDER — SIMETHICONE 80 MG
80 TABLET,CHEWABLE ORAL EVERY 6 HOURS PRN
Status: DISCONTINUED | OUTPATIENT
Start: 2022-09-27 | End: 2022-09-30 | Stop reason: HOSPADM

## 2022-09-27 RX ORDER — LIDOCAINE 4 G/G
1 PATCH TOPICAL DAILY
Status: DISCONTINUED | OUTPATIENT
Start: 2022-09-27 | End: 2022-09-30 | Stop reason: HOSPADM

## 2022-09-27 RX ORDER — LORAZEPAM 2 MG/ML
1 INJECTION INTRAMUSCULAR ONCE
Status: COMPLETED | OUTPATIENT
Start: 2022-09-27 | End: 2022-09-27

## 2022-09-27 RX ADMIN — GABAPENTIN 400 MG: 400 CAPSULE ORAL at 08:32

## 2022-09-27 RX ADMIN — SODIUM CHLORIDE: 9 INJECTION, SOLUTION INTRAVENOUS at 22:27

## 2022-09-27 RX ADMIN — Medication 40 MG: at 08:33

## 2022-09-27 RX ADMIN — Medication 25 MG: at 08:32

## 2022-09-27 RX ADMIN — DIPHENHYDRAMINE HYDROCHLORIDE 25 MG: 50 INJECTION, SOLUTION INTRAMUSCULAR; INTRAVENOUS at 03:03

## 2022-09-27 RX ADMIN — LORAZEPAM 1 MG: 2 INJECTION INTRAMUSCULAR; INTRAVENOUS at 10:50

## 2022-09-27 RX ADMIN — ATORVASTATIN CALCIUM 10 MG: 10 TABLET, FILM COATED ORAL at 08:33

## 2022-09-27 RX ADMIN — ENOXAPARIN SODIUM 40 MG: 100 INJECTION SUBCUTANEOUS at 08:33

## 2022-09-27 RX ADMIN — ONDANSETRON 4 MG: 2 INJECTION INTRAMUSCULAR; INTRAVENOUS at 11:50

## 2022-09-27 RX ADMIN — OXYCODONE HYDROCHLORIDE 20 MG: 10 TABLET, FILM COATED, EXTENDED RELEASE ORAL at 08:33

## 2022-09-27 RX ADMIN — OXYCODONE HYDROCHLORIDE 20 MG: 10 TABLET, FILM COATED, EXTENDED RELEASE ORAL at 20:47

## 2022-09-27 RX ADMIN — Medication 25 MG: at 15:15

## 2022-09-27 RX ADMIN — OXYCODONE 5 MG: 5 TABLET ORAL at 17:17

## 2022-09-27 RX ADMIN — SIMETHICONE 80 MG: 80 TABLET, CHEWABLE ORAL at 02:25

## 2022-09-27 RX ADMIN — DIPHENHYDRAMINE HYDROCHLORIDE 25 MG: 50 INJECTION, SOLUTION INTRAMUSCULAR; INTRAVENOUS at 22:28

## 2022-09-27 RX ADMIN — GABAPENTIN 400 MG: 400 CAPSULE ORAL at 20:47

## 2022-09-27 RX ADMIN — DIPHENHYDRAMINE HYDROCHLORIDE 25 MG: 50 INJECTION, SOLUTION INTRAMUSCULAR; INTRAVENOUS at 11:51

## 2022-09-27 RX ADMIN — Medication 25 MG: at 22:28

## 2022-09-27 RX ADMIN — OXYCODONE 5 MG: 5 TABLET ORAL at 03:15

## 2022-09-27 RX ADMIN — SODIUM CHLORIDE, PRESERVATIVE FREE 10 ML: 5 INJECTION INTRAVENOUS at 08:34

## 2022-09-27 ASSESSMENT — PAIN DESCRIPTION - LOCATION
LOCATION: ABDOMEN

## 2022-09-27 ASSESSMENT — PAIN DESCRIPTION - DESCRIPTORS
DESCRIPTORS: SHARP

## 2022-09-27 ASSESSMENT — PAIN DESCRIPTION - ORIENTATION
ORIENTATION: RIGHT
ORIENTATION: RIGHT;LOWER;MID

## 2022-09-27 ASSESSMENT — PAIN SCALES - GENERAL
PAINLEVEL_OUTOF10: 0
PAINLEVEL_OUTOF10: 10
PAINLEVEL_OUTOF10: 10
PAINLEVEL_OUTOF10: 0
PAINLEVEL_OUTOF10: 8
PAINLEVEL_OUTOF10: 8

## 2022-09-27 ASSESSMENT — PAIN DESCRIPTION - PAIN TYPE
TYPE: ACUTE PAIN
TYPE: ACUTE PAIN

## 2022-09-27 ASSESSMENT — PAIN DESCRIPTION - ONSET
ONSET: ON-GOING
ONSET: ON-GOING

## 2022-09-27 ASSESSMENT — PAIN DESCRIPTION - FREQUENCY
FREQUENCY: CONTINUOUS
FREQUENCY: CONTINUOUS

## 2022-09-27 NOTE — PROGRESS NOTES
Patient is alert and oriented x4, up with stand by assist, call light within reach, bed/chair alarm on. AM meds complete, patient tolerated well. VSS and WDL. No s/s of distress, no further needs noted at this time.    Electronically signed by Esther Rodriguez RN on 9/27/2022 at 10:39 AM

## 2022-09-27 NOTE — PLAN OF CARE
ALYCE GÓMEZ  MRN-[id number]    Follow Up:  Klebsiella urosepsis     Interval History: The pt was seen and examined earlier, no distress, no new complaints. The pt is afebrile, on RA, WBC increased 12.09.     PAST MEDICAL & SURGICAL HISTORY:  BPH (benign prostatic hypertrophy)      Sickle cell trait      Glaucoma      AF (atrial fibrillation)  No A/C secondary to history of GI bleed  S/P PPM, S/P Watchman      Chronic venous insufficiency      Thalassemia      S/P cardiac pacemaker procedure  Biotronik      S/P hip replacement, left          ROS:    [ ] Unobtainable because:  [ x] All other systems negative    Constitutional: no fever, no chills  Head: no trauma  Eyes: no vision changes, no eye pain  ENT:  no sore throat, no rhinorrhea  Cardiovascular:  no chest pain, no palpitation  Respiratory:  no SOB, no cough  GI:  no abd pain, no vomiting, no diarrhea  urinary: no dysuria, no hematuria, no flank pain  musculoskeletal:  no joint pain, no joint swelling  skin:  no rash  neurology:  no headache, no seizure, no change in mental status  psych: no anxiety, no depression         Allergies  No Known Allergies        ANTIMICROBIALS:  cefpodoxime 200 every 12 hours      OTHER MEDS:  aMIOdarone    Tablet   Oral   aMIOdarone    Tablet 400 milliGRAM(s) Oral every 8 hours  bicalutamide 50 milliGRAM(s) Oral daily  chlorhexidine 2% Cloths 1 Application(s) Topical daily  enoxaparin Injectable 40 milliGRAM(s) SubCutaneous every 24 hours  fentaNYL   Patch  12 MICROgram(s)/Hr 1 Patch Transdermal every 72 hours  metoprolol tartrate 25 milliGRAM(s) Oral two times a day  midodrine 10 milliGRAM(s) Oral every 8 hours PRN  nystatin Powder 1 Application(s) Topical two times a day  pantoprazole    Tablet 40 milliGRAM(s) Oral before breakfast  polyethylene glycol 3350 17 Gram(s) Oral daily  senna 2 Tablet(s) Oral at bedtime  tamsulosin 0.4 milliGRAM(s) Oral at bedtime      Vital Signs Last 24 Hrs  T(C): 36.6 (12 May 2022 15:14), Max: 36.9 (12 May 2022 10:48)  T(F): 97.9 (12 May 2022 15:14), Max: 98.4 (12 May 2022 10:48)  HR: 70 (12 May 2022 15:14) (70 - 80)  BP: 123/69 (12 May 2022 15:14) (94/58 - 127/76)  BP(mean): --  RR: 17 (12 May 2022 15:14) (16 - 22)  SpO2: 94% (12 May 2022 15:14) (94% - 98%)    Physical Exam:  General: Nontoxic-appearing Male in no acute distress.   HEENT: AT/NC. Anicteric. Conjunctiva pink and moist. Oropharynx clear.  Neck: Not rigid. No sense of mass.  Nodes: None palpable.  Lungs: Diminished breath sounds bilaterally, no rales, no wheezes, no rhonchi  Heart: RRR  Abdomen: Bowel sounds present and normoactive. Soft. Nondistended. Nontender. No sense of mass. No organomegaly.  Extremities: No cyanosis or clubbing. 1+ edema. Heels offloaded. B/l LE venous stasis changes   Skin: Warm. Dry. Good turgor. No rash. No vasculitic stigmata. Venous stasis changes  Psychiatric: Grossly appropriate affect and mood for situation.     WBC Count: 12.09 K/uL (05-12 @ 08:01)  WBC Count: 10.05 K/uL (05-11 @ 07:27)  WBC Count: 10.10 K/uL (05-10 @ 11:45)  WBC Count: 9.64 K/uL (05-10 @ 02:52)  WBC Count: 10.97 K/uL (05-09 @ 03:34)  WBC Count: 10.04 K/uL (05-08 @ 02:25)  WBC Count: 11.68 K/uL (05-07 @ 03:19)                            7.1    12.09 )-----------( 221      ( 12 May 2022 08:01 )             22.1       05-12    142  |  108  |  38<H>  ----------------------------<  109<H>  5.3   |  29  |  1.27    Ca    8.7      12 May 2022 08:01  Phos  4.2     05-11  Mg     2.2     05-11    TPro  7.0  /  Alb  2.1<L>  /  TBili  0.6  /  DBili  x   /  AST  50<H>  /  ALT  92<H>  /  AlkPhos  475<H>  05-11          Creatinine Trend: 1.27<--, 0.98<--, 1.03<--, 1.11<--, 1.22<--, 1.39<--      MICROBIOLOGY:  v  .Blood Blood  05-07-22   No Growth Final  --  --      .Blood Blood-Peripheral  05-05-22   Growth in aerobic and anaerobic bottles: Klebsiella pneumoniae  ***Blood Panel PCR results on this specimen are available  approximately 3 hours after the Gram stain result.***  Gram stain, PCR, and/or culture results may not always  correspond dueto difference in methodologies.  ************************************************************  This PCR assay was performed by multiplex PCR. This  Assay tests for 66 bacterial and resistance gene targets.  Please refer to the [facility] Labs test directory  at [email or web address] for details.  --  Blood Culture PCR  Klebsiella pneumoniae      Clean Catch Clean Catch (Midstream)  05-05-22   >100,000 CFU/ml Klebsiella pneumoniae  --  Klebsiella pneumoniae    SARS-CoV-2 Result: NotDetec (05-11-22 @ 08:30)    COVID-19 PCR: NotDetec (14 Apr 2022 22:30)  COVID-19 PCR: NotDetec (11 Apr 2022 08:25)  COVID-19 PCR: NotDetec (03 Apr 2022 07:55)  COVID-19 PCR: NotDetec (23 Mar 2022 17:55)  COVID-19 PCR: NotDetec (11 Jan 2022 19:56)  COVID-19 PCR: NotDetec (28 Nov 2021 12:47)    RADIOLOGY:     Problem: Discharge Planning  Goal: Discharge to home or other facility with appropriate resources  9/27/2022 1037 by Talya Engle RN  Outcome: Progressing  Flowsheets (Taken 9/27/2022 8142)  Discharge to home or other facility with appropriate resources: Identify barriers to discharge with patient and caregiver     Problem: Pain  Goal: Verbalizes/displays adequate comfort level or baseline comfort level  9/27/2022 1037 by Talya Engle RN  Outcome: Progressing   Pain /discomfort being managed with PRN analgesics per MD orders. Patient able to express presence and absence of pain and rate pain appropriately using numerical scale.      Problem: Safety - Adult  Goal: Free from fall injury  9/27/2022 1037 by Talya Engle RN  Outcome: Progressing  Flowsheets (Taken 9/27/2022 3328)  Free From Fall Injury: Wan Desir family/caregiver on patient safety     Problem: Chronic Conditions and Co-morbidities  Goal: Patient's chronic conditions and co-morbidity symptoms are monitored and maintained or improved  9/27/2022 1037 by Talya Engle RN  Outcome: Progressing  Flowsheets (Taken 9/27/2022 8386)  Care Plan - Patient's Chronic Conditions and Co-Morbidity Symptoms are Monitored and Maintained or Improved: Monitor and assess patient's chronic conditions and comorbid symptoms for stability, deterioration, or improvement     Problem: Respiratory - Adult  Goal: Achieves optimal ventilation and oxygenation  9/27/2022 1037 by Talya Engle RN  Outcome: Progressing     Problem: Skin/Tissue Integrity - Adult  Goal: Skin integrity remains intact  9/27/2022 1037 by Talya Engle RN  Outcome: Progressing  Flowsheets (Taken 9/27/2022 9891)  Skin Integrity Remains Intact: Monitor for areas of redness and/or skin breakdown     Problem: Gastrointestinal - Adult  Goal: Minimal or absence of nausea and vomiting  9/27/2022 1037 by Talya Engle RN  Outcome: Progressing  Flowsheets (Taken 9/27/2022 0833)  Minimal or absence of nausea and vomiting: Administer IV fluids as ordered to ensure adequate hydration     Problem: Metabolic/Fluid and Electrolytes - Adult  Goal: Electrolytes maintained within normal limits  9/27/2022 1037 by Bernardo Sagastume RN  Outcome: Progressing  Flowsheets (Taken 9/27/2022 2816)  Electrolytes maintained within normal limits: Monitor labs and assess patient for signs and symptoms of electrolyte imbalances     Problem: ABCDS Injury Assessment  Goal: Absence of physical injury  Outcome: Progressing  Flowsheets (Taken 9/27/2022 7633)  Absence of Physical Injury: Implement safety measures based on patient assessment

## 2022-09-27 NOTE — CARE COORDINATION
09/27/22 0817   Readmission Assessment   Number of Days since last admission? 8-30 days   Previous Disposition Home with Family   Who is being Interviewed Patient   What was the patient's/caregiver's perception as to why they think they needed to return back to the hospital? Other (Comment)  (can't keep anything down)   Did you visit your Primary Care Physician after you left the hospital, before you returned this time? No   Why weren't you able to visit your PCP? Did not have an appointment   Did you see a specialist, such as Cardiac, Pulmonary, Orthopedic Physician, etc. after you left the hospital? No   Who advised the patient to return to the hospital? Self-referral   Does the patient report anything that got in the way of taking their medications?  No     Ceci Infante RN, BSN,   [phone]  Electronically signed by Ceci Infante RN on 9/27/2022 at 8:17 AM

## 2022-09-27 NOTE — PROGRESS NOTES
General and Vascular Surgery                                                           Daily Progress Note                                                             Samia Lamb PA-C     Pt Name: Harish Media Record Number: [id number]  Date of Birth 1980   Today's Date: 9/27/2022    ASSESSMENT/PLAN     COVID+  Abdominal pain. Secondary to nausea/emesis and being unable to keep anything down, including pain medication  No significant change from yesterday. +abdominal pain and nausea after PO intake  S/P 9/14/22 laparoscopic lysis of adhesions, and removal of subcutaneous abdominal wall mass  Leukocytosis improved: 14.8-->11.0-->9.7  Dilated CBD on CT scan LFT normal. GI ordered an MRCP. Monitor and control pain  IVF  OK to ADAT from a general surgery standpoint  No general surgery needs at this time  Once able to tolerate a diet and pain controlled, ok to D/C back home from a general surgery standpoint. EDUCATION    Patient educated about their illness/diagnosis, stated above, and all questions answered. We discussed the importance of nutrition, medications they are taking, and healthy lifestyle. SUBJECTIVE  Petrona is unchanged from yesterday. Pain is well controlled. She has nausea and no vomiting. Current activity is ad martha    OBJECTIVE  VITALS:  weight is 154 lb 5.2 oz (70 kg). Her oral temperature is 98.6 °F (37 °C). Her blood pressure is 95/57 (abnormal) and her pulse is 55. Her respiration is 18 and oxygen saturation is 97%. VITALS:  BP (!) 95/57   Pulse 55   Temp 98.6 °F (37 °C) (Oral)   Resp 18   Wt 154 lb 5.2 oz (70 kg)   LMP 10/31/2016 (Exact Date)   SpO2 97%   BMI 31.17 kg/m²   GENERAL: alert, no distress  ABDOMEN: tenderness present- diffuse,  without rebound and guarding and distention present-mild  I/O last 3 completed shifts:   In: 1393.4 [P.O.:480; I.V.:813.4; IV Piggyback:100]  Out: -   No intake/output data

## 2022-09-27 NOTE — PROGRESS NOTES
Hospitalist Progress Note  9/27/2022 10:18 AM    PCP: Carmen Baum - [facility]    [id number]     Date of Admission: 9/25/2022                                                                                                                     HOSPITAL COURSE    Patient demographics:  The patient  Madi Lilly is a 38 y.o. female     Significant past medical history:   Patient Active Problem List   Diagnosis    Attention deficit hyperactivity disorder (ADHD)     (ventriculoperitoneal) shunt status    Scoliosis    Prediabetes    Tobacco smoker    Onychomycosis    Congenital hydrocephalus ([address]    Sepsis without acute organ dysfunction (HCC)    Ureteritis    Acute cystitis without hematuria    History of brain shunt    Mood disorder (HCC)    Numbness and tingling in left hand    Diverticulosis    Colitis    Infection due to extended-spectrum beta-lactamase-producing Escherichia coli    Kidney stone    Intermittent small bowel obstruction due to adhesions ([facility])    Abdominal wall mass    Cyst and pseudocyst of pancreas    Abdominal pain    Nausea and vomiting    COVID         Presenting symptoms:  abdominal pain w/ fevers    Diagnostic workup:      CONSULTS DURING ADMISSION :   IP CONSULT TO GENERAL SURGERY  IP CONSULT TO HOSPITALIST  IP CONSULT TO GENERAL SURGERY  IP CONSULT TO GI      Patient was diagnosed with:  Abdominal pain  Nausea vomiting  COVID: positive        Treatment while inpatient:  Patient presented to the emergency department complaining of abdominal pain and fevers for 9 days. ----------------------------------------------------------      SUBJECTIVE COMPLAINTS- abdominal pain w/ fevers    Diet: ADULT DIET;  Regular      OBJECTIVE:   Patient Active Problem List   Diagnosis    Attention deficit hyperactivity disorder (ADHD)     (ventriculoperitoneal) shunt status    Scoliosis    Prediabetes    Tobacco smoker Onychomycosis    Congenital hydrocephalus (HCC)    Sepsis without acute organ dysfunction (HCC)    Ureteritis    Acute cystitis without hematuria    History of brain shunt    Mood disorder (HCC)    Numbness and tingling in left hand    Diverticulosis    Colitis    Infection due to extended-spectrum beta-lactamase-producing Escherichia coli    Kidney stone    Intermittent small bowel obstruction due to adhesions (HCC)    Abdominal wall mass    Cyst and pseudocyst of pancreas    Abdominal pain    Nausea and vomiting    COVID       Allergies  Bee venom, Bentyl [dicyclomine hcl], Dicyclomine, Ketorolac tromethamine, Levofloxacin, Maitake, Shiitake mushroom, Sulfa antibiotics, Vancomycin, Zosyn [piperacillin sod-tazobactam so], Adhesive tape, Oxycodone-acetaminophen, Sulfacetamide, Acetaminophen, Butalbital-apap-caff-cod, Ceftaroline, Daptomycin, Fosfomycin tromethamine, Haldol [haloperidol], Ketamine, Methocarbamol, Morphine, Nitrofurantoin, Prochlorperazine, Reglan [metoclopramide], Silicone, and Tazobactam    Medications    Scheduled Meds:   LORazepam  1 mg IntraVENous Once    lidocaine  1 patch TransDERmal Daily    oxyCODONE  20 mg Oral 2 times per day    0.9 % sodium chloride  30 mL/kg IntraVENous Once    lidocaine 1 % injection  5 mL IntraDERmal Once    gabapentin  400 mg Oral TID    atorvastatin  10 mg Oral Daily    sodium chloride flush  5-40 mL IntraVENous 2 times per day    enoxaparin  40 mg SubCUTAneous Daily    pantoprazole (PROTONIX) 40 mg injection  40 mg IntraVENous Daily     Continuous Infusions:   sodium chloride      sodium chloride 125 mL/hr at 09/26/22 1828     PRN Meds:  simethicone, oxyCODONE, LORazepam, hydrOXYzine HCl, sodium chloride flush, sodium chloride, ondansetron **OR** ondansetron, polyethylene glycol, acetaminophen **OR** acetaminophen, promethazine, diphenhydrAMINE    Vitals   Vitals /wt Patient Vitals for the past 8 hrs:   BP Temp Temp src Pulse Resp SpO2 Weight   09/27/22 0749 (!) 95/57 98.6 °F (37 °C) Oral 55 18 97 % --   09/27/22 0315 110/67 98.4 °F (36.9 °C) Oral 60 20 96 % 154 lb 5.2 oz (70 kg)        72HR INTAKE/OUTPUT:    Intake/Output Summary (Last 24 hours) at 9/27/2022 1018  Last data filed at 9/27/2022 3910  Gross per 24 hour   Intake 480 ml   Output --   Net 480 ml       Exam:    Gen:   Alert and oriented ×3    Eyes: PERRL. No sclera icterus. No conjunctival injection. ENT: No discharge. Pharynx clear. External appearance of ears and nose normal.  Neck: Trachea midline. No obvious mass. Resp: No accessory muscle use. No crackles. No wheezes. No rhonchi. CV: Regular rate. Regular rhythm. No murmur or rub. No edema. GI: Non-tender. Non-distended. No hernia. Skin: Warm, dry, normal texture and turgor. Lymph: No cervical LAD. No supraclavicular LAD. M/S: / Ext. No cyanosis. No clubbing. No joint deformity. Neuro: CN 2-12 are intact,  no neurologic deficits noted. PT/INR: No results for input(s): PROTIME, INR in the last 72 hours. APTT: No results for input(s): APTT in the last 72 hours. CBC:   Recent Labs     09/25/22  1513 09/26/22  0700 09/27/22  0534   WBC 14.8* 11.0 9.7   HGB 15.3 12.3 11.8*   HCT 45.3 36.6 35.3*   MCV 82.7 82.8 84.0    340 278       BMP:   Recent Labs     09/25/22  1513 09/26/22  0700 09/27/22  0534    142 140   K 3.8 4.0 3.8    108 106   CO2 21 24 24   BUN 12 15 20   CREATININE 0.8 0.6 0.8       LIVER PROFILE:   Recent Labs     09/25/22  1513   ALKPHOS 171*   AST 17   ALT 34   BILITOT 0.4     No results for input(s): AMYLASE in the last 72 hours. Recent Labs     09/25/22  1513   LIPASE 17.0       UA:No results for input(s): NITRITE, LABCAST, WBCUA, RBCUA, MUCUS in the last 72 hours. TROPONIN: No results for input(s): Ghada Masterson in the last 72 hours. Lab Results   Component Value Date/Time    URRFLXCULT Not Indicated 09/25/2022 03:16 PM       No results for input(s): TSHREFLEX in the last 72 hours.     No components found for: BCA1717  POC GLUCOSE:    Recent Labs     09/26/22  0746 09/26/22  1149 09/26/22  1524 09/26/22  1942 09/27/22  0754   POCGLU 81 88 149* 122* 107*     No results for input(s): LABA1C in the last 72 hours. Lab Results   Component Value Date/Time    LABA1C 6.3 08/24/2022 06:22 AM         ASSESSMENT AND PLAN    Abdominal pain:   Status post lysis of adhesions and subcutaneous mass removal   September 14, 2022,   known history of chronic abdominal pain. CT abdomen and pelvis with and without contrast unremarkable,  General surgery is following    Patient is afebrile. No hypotension. We will continue cefepime-> de-escalate if WBC and LA trend down, again presentation may simply be covid related       Nausea vomiting:   Has a history of chronic issues regarding nausea vomiting but also may be triggered by Covid  Phenergan and Benadryl IV  Pepcid IV and Protonix IV  IVF:  ml/hr  Clear liquids  GI consult is appreciated  Follow-up: MRCP  Continue with proton pump inhibitors     Chronic pain:   Continue Oxy IR add oxycontin  Despite patient stating that she has not tolerated her pain medication for the last 9 days there is no evidence or clinical indicators of withdrawal  no IV narcotics for this patient until there is a medical indication for such     COVID:   positive on 9/21: currently no respiratory symptoms. Code Status: Full Code        Dispo - cc        The patient and / or the family were informed of the results of any tests, a time was given to answer questions, a plan was proposed and they agreed with plan. Jose Angel Vick MD    This note was transcribed using [employer]. Please disregard any translational errors.

## 2022-09-27 NOTE — PROGRESS NOTES
Physician Progress Note      Saad Wilkins  CSN #:                  [id number]  :                       1980  ADMIT DATE:       2022 2:02 PM  [address] [place] DATE:  RESPONDING  PROVIDER #:        Yady Johnston MD          QUERY TEXT:    Patient admitted with Covid . Documentation reflects Sepsis  in ED note . If   possible, please document in the progress notes and discharge summary if   Sepsis  was: The medical record reflects the following:  Risk Factors: + covid , s/p-laparoscopic lysis of adhesions and removal of   subcutaneous abdominal wall mass  Clinical Indicators: Documentation per ED of Sepsis. On admission wbc 14.8,   lactic acid-2.2, hr-112,  b/p 95/61  90/52 .  + covid  Per Surgery consult- no   dilated bowel or postop issues. Treatment: ns bolus, ivf, iv maxipime, monitor labs    Thank you, Juanita Tate RN CDS CRCR [email or web address]  Options provided:  -- Sepsis  confirmed after study  -- Sepsis  treated and resolved  -- Sepsis ruled out after study  -- Other - I will add my own diagnosis  -- Disagree - Not applicable / Not valid  -- Disagree - Clinically unable to determine / Unknown  -- Refer to Clinical Documentation Reviewer    PROVIDER RESPONSE TEXT:    Sepsis confirmed after study. Query created by:  Aaron Tate on 2022 9:28 AM      Electronically signed by:  Yady Johnston MD 2022 9:36 AM

## 2022-09-27 NOTE — PLAN OF CARE
Problem: Discharge Planning  Goal: Discharge to home or other facility with appropriate resources  Outcome: Progressing     Problem: Pain  Goal: Verbalizes/displays adequate comfort level or baseline comfort level  Outcome: Progressing     Problem: Safety - Adult  Goal: Free from fall injury  Outcome: Progressing     Problem: Respiratory - Adult  Goal: Achieves optimal ventilation and oxygenation  Outcome: Progressing     Problem: Skin/Tissue Integrity - Adult  Goal: Skin integrity remains intact  Outcome: Progressing  Goal: Incisions, wounds, or drain sites healing without S/S of infection  Outcome: Progressing     Problem: Gastrointestinal - Adult  Goal: Minimal or absence of nausea and vomiting  Outcome: Progressing  Goal: Maintains or returns to baseline bowel function  Outcome: Progressing  Goal: Maintains adequate nutritional intake  Outcome: Progressing     Problem: Metabolic/Fluid and Electrolytes - Adult  Goal: Electrolytes maintained within normal limits  Outcome: Progressing  Goal: Hemodynamic stability and optimal renal function maintained  Outcome: Progressing     Problem: Chronic Conditions and Co-morbidities  Goal: Patient's chronic conditions and co-morbidity symptoms are monitored and maintained or improved  Outcome: Progressing

## 2022-09-28 LAB
ANION GAP SERPL CALCULATED.3IONS-SCNC: 11 MMOL/L (ref 3–16)
BUN BLDV-MCNC: 13 MG/DL (ref 7–20)
CALCIUM SERPL-MCNC: 8.6 MG/DL (ref 8.3–10.6)
CHLORIDE BLD-SCNC: 105 MMOL/L (ref 99–110)
CO2: 25 MMOL/L (ref 21–32)
CREAT SERPL-MCNC: 0.6 MG/DL (ref 0.6–1.1)
GFR AFRICAN AMERICAN: >60
GFR NON-AFRICAN AMERICAN: >60
GLUCOSE BLD-MCNC: 117 MG/DL (ref 70–99)
GLUCOSE BLD-MCNC: 124 MG/DL (ref 70–99)
GLUCOSE BLD-MCNC: 145 MG/DL (ref 70–99)
GLUCOSE BLD-MCNC: 82 MG/DL (ref 70–99)
HCT VFR BLD CALC: 34.7 % (ref 36–48)
HEMOGLOBIN: 11.7 G/DL (ref 12–16)
MCH RBC QN AUTO: 28.2 PG (ref 26–34)
MCHC RBC AUTO-ENTMCNC: 33.7 G/DL (ref 31–36)
MCV RBC AUTO: 83.5 FL (ref 80–100)
PDW BLD-RTO: 15.2 % (ref 12.4–15.4)
PERFORMED ON: ABNORMAL
PERFORMED ON: ABNORMAL
PERFORMED ON: NORMAL
PLATELET # BLD: 275 K/UL (ref 135–450)
PMV BLD AUTO: 7.7 FL (ref 5–10.5)
POTASSIUM SERPL-SCNC: 3.7 MMOL/L (ref 3.5–5.1)
PROCALCITONIN: 0.03 NG/ML (ref 0–0.15)
RBC # BLD: 4.16 M/UL (ref 4–5.2)
SODIUM BLD-SCNC: 141 MMOL/L (ref 136–145)
WBC # BLD: 7.4 K/UL (ref 4–11)

## 2022-09-28 PROCEDURE — 6370000000 HC RX 637 (ALT 250 FOR IP): Performed by: HOSPITALIST

## 2022-09-28 PROCEDURE — 84145 PROCALCITONIN (PCT): CPT

## 2022-09-28 PROCEDURE — 85027 COMPLETE CBC AUTOMATED: CPT

## 2022-09-28 PROCEDURE — 6360000002 HC RX W HCPCS: Performed by: NURSE PRACTITIONER

## 2022-09-28 PROCEDURE — 2580000003 HC RX 258: Performed by: NURSE PRACTITIONER

## 2022-09-28 PROCEDURE — 1200000000 HC SEMI PRIVATE

## 2022-09-28 PROCEDURE — 80048 BASIC METABOLIC PNL TOTAL CA: CPT

## 2022-09-28 PROCEDURE — C9113 INJ PANTOPRAZOLE SODIUM, VIA: HCPCS | Performed by: NURSE PRACTITIONER

## 2022-09-28 PROCEDURE — 6370000000 HC RX 637 (ALT 250 FOR IP): Performed by: NURSE PRACTITIONER

## 2022-09-28 RX ADMIN — GABAPENTIN 400 MG: 400 CAPSULE ORAL at 08:32

## 2022-09-28 RX ADMIN — OXYCODONE 5 MG: 5 TABLET ORAL at 03:20

## 2022-09-28 RX ADMIN — Medication 25 MG: at 07:55

## 2022-09-28 RX ADMIN — GABAPENTIN 400 MG: 400 CAPSULE ORAL at 20:31

## 2022-09-28 RX ADMIN — DIPHENHYDRAMINE HYDROCHLORIDE 25 MG: 50 INJECTION, SOLUTION INTRAMUSCULAR; INTRAVENOUS at 07:54

## 2022-09-28 RX ADMIN — Medication 25 MG: at 20:33

## 2022-09-28 RX ADMIN — Medication 40 MG: at 08:33

## 2022-09-28 RX ADMIN — SODIUM CHLORIDE, PRESERVATIVE FREE 10 ML: 5 INJECTION INTRAVENOUS at 08:41

## 2022-09-28 RX ADMIN — OXYCODONE HYDROCHLORIDE 20 MG: 10 TABLET, FILM COATED, EXTENDED RELEASE ORAL at 08:32

## 2022-09-28 RX ADMIN — SODIUM CHLORIDE: 9 INJECTION, SOLUTION INTRAVENOUS at 08:42

## 2022-09-28 RX ADMIN — ONDANSETRON 4 MG: 2 INJECTION INTRAMUSCULAR; INTRAVENOUS at 17:32

## 2022-09-28 RX ADMIN — DIPHENHYDRAMINE HYDROCHLORIDE 25 MG: 50 INJECTION, SOLUTION INTRAMUSCULAR; INTRAVENOUS at 20:31

## 2022-09-28 RX ADMIN — ATORVASTATIN CALCIUM 10 MG: 10 TABLET, FILM COATED ORAL at 08:32

## 2022-09-28 RX ADMIN — SODIUM CHLORIDE: 9 INJECTION, SOLUTION INTRAVENOUS at 17:36

## 2022-09-28 RX ADMIN — OXYCODONE 5 MG: 5 TABLET ORAL at 12:31

## 2022-09-28 RX ADMIN — OXYCODONE HYDROCHLORIDE 20 MG: 10 TABLET, FILM COATED, EXTENDED RELEASE ORAL at 20:31

## 2022-09-28 RX ADMIN — ENOXAPARIN SODIUM 40 MG: 100 INJECTION SUBCUTANEOUS at 08:36

## 2022-09-28 RX ADMIN — GABAPENTIN 400 MG: 400 CAPSULE ORAL at 14:59

## 2022-09-28 RX ADMIN — DIPHENHYDRAMINE HYDROCHLORIDE 25 MG: 50 INJECTION, SOLUTION INTRAMUSCULAR; INTRAVENOUS at 15:00

## 2022-09-28 RX ADMIN — OXYCODONE 5 MG: 5 TABLET ORAL at 17:31

## 2022-09-28 ASSESSMENT — PAIN DESCRIPTION - LOCATION
LOCATION: ABDOMEN
LOCATION: ABDOMEN

## 2022-09-28 ASSESSMENT — PAIN SCALES - GENERAL
PAINLEVEL_OUTOF10: 9
PAINLEVEL_OUTOF10: 9
PAINLEVEL_OUTOF10: 8

## 2022-09-28 ASSESSMENT — PAIN DESCRIPTION - DESCRIPTORS: DESCRIPTORS: SHARP

## 2022-09-28 ASSESSMENT — PAIN DESCRIPTION - ORIENTATION: ORIENTATION: RIGHT

## 2022-09-28 NOTE — PLAN OF CARE
Problem: Discharge Planning  Goal: Discharge to home or other facility with appropriate resources  9/28/2022 1119 by Rommel Phoenix RN  Outcome: Progressing  Flowsheets (Taken 9/28/2022 1116)  Discharge to home or other facility with appropriate resources:   Identify barriers to discharge with patient and caregiver   Arrange for needed discharge resources and transportation as appropriate   Identify discharge learning needs (meds, wound care, etc)   Arrange for interpreters to assist at discharge as needed   Refer to discharge planning if patient needs post-hospital services based on physician order or complex needs related to functional status, cognitive ability or social support system  9/28/2022 0513 by Ole Ceron RN  Outcome: Progressing     Problem: Pain  Goal: Verbalizes/displays adequate comfort level or baseline comfort level  9/28/2022 1119 by Rommel Phoenix RN  Outcome: Progressing  9/28/2022 0513 by Ole Ceron RN  Outcome: Progressing     Problem: Safety - Adult  Goal: Free from fall injury  9/28/2022 1119 by Rommel Phoenix RN  Outcome: Progressing  9/28/2022 0513 by Ole Ceron RN  Outcome: Progressing     Problem: Chronic Conditions and Co-morbidities  Goal: Patient's chronic conditions and co-morbidity symptoms are monitored and maintained or improved  9/28/2022 1119 by Rommel Phoenix RN  Outcome: Progressing  Flowsheets (Taken 9/28/2022 1116)  Care Plan - Patient's Chronic Conditions and Co-Morbidity Symptoms are Monitored and Maintained or Improved:   Monitor and assess patient's chronic conditions and comorbid symptoms for stability, deterioration, or improvement   Collaborate with multidisciplinary team to address chronic and comorbid conditions and prevent exacerbation or deterioration   Update acute care plan with appropriate goals if chronic or comorbid symptoms are exacerbated and prevent overall improvement and discharge  9/28/2022 0513 by Ole Ceron RN  Outcome: Progressing     Problem: Respiratory - Adult  Goal: Achieves optimal ventilation and oxygenation  9/28/2022 1119 by Malena Wisnlow RN  Outcome: Progressing  Flowsheets (Taken 9/28/2022 1116)  Achieves optimal ventilation and oxygenation:   Assess for changes in respiratory status   Position to facilitate oxygenation and minimize respiratory effort   Assess for changes in mentation and behavior  9/28/2022 0513 by Leroy Parker RN  Outcome: Progressing     Problem: Skin/Tissue Integrity - Adult  Goal: Skin integrity remains intact  9/28/2022 1119 by Malena Winslow RN  Outcome: Progressing  Flowsheets (Taken 9/28/2022 1116)  Skin Integrity Remains Intact:   Monitor for areas of redness and/or skin breakdown   Assess vascular access sites hourly  9/28/2022 0513 by Leroy Parker RN  Outcome: Progressing  Goal: Incisions, wounds, or drain sites healing without S/S of infection  9/28/2022 1119 by Malena Winslow RN  Outcome: Progressing  Flowsheets (Taken 9/28/2022 1116)  Incisions, Wounds, or Drain Sites Healing Without Sign and Symptoms of Infection: Implement wound care per orders  9/28/2022 0513 by Leroy Parker RN  Outcome: Progressing     Problem: Gastrointestinal - Adult  Goal: Minimal or absence of nausea and vomiting  9/28/2022 1119 by Malena Winslow RN  Outcome: Progressing  Flowsheets (Taken 9/28/2022 1116)  Minimal or absence of nausea and vomiting: Administer IV fluids as ordered to ensure adequate hydration  9/28/2022 0513 by Leroy Parker RN  Outcome: Progressing  Goal: Maintains or returns to baseline bowel function  9/28/2022 1119 by Malena Winslow RN  Outcome: Progressing  Flowsheets (Taken 9/28/2022 1116)  Maintains or returns to baseline bowel function:   Assess bowel function   Encourage oral fluids to ensure adequate hydration  9/28/2022 0513 by Leroy Parker RN  Outcome: Progressing  Goal: Maintains adequate nutritional intake  9/28/2022 1119 by Malena Winslow RN  Outcome: Progressing  Flowsheets (Taken 9/28/2022 1116)  Maintains adequate nutritional intake:   Monitor percentage of each meal consumed   Assist with meals as needed   Identify factors contributing to decreased intake, treat as appropriate  9/28/2022 0513 by Andrea Valdes RN  Outcome: Progressing     Problem: Metabolic/Fluid and Electrolytes - Adult  Goal: Electrolytes maintained within normal limits  9/28/2022 1119 by Erica Luevano RN  Outcome: Progressing  Flowsheets (Taken 9/28/2022 1116)  Electrolytes maintained within normal limits:   Monitor labs and assess patient for signs and symptoms of electrolyte imbalances   Administer electrolyte replacement as ordered   Monitor response to electrolyte replacements, including repeat lab results as appropriate  9/28/2022 0513 by Andrea Valdes RN  Outcome: Progressing  Goal: Hemodynamic stability and optimal renal function maintained  9/28/2022 1119 by Erica Luevano RN  Outcome: Progressing  Flowsheets (Taken 9/28/2022 1116)  Hemodynamic stability and optimal renal function maintained:   Monitor labs and assess for signs and symptoms of volume excess or deficit   Monitor urine specific gravity, serum osmolarity and serum sodium as indicated or ordered   Monitor intake, output and patient weight  9/28/2022 0513 by Andrea Valdes RN  Outcome: Progressing     Problem: ABCDS Injury Assessment  Goal: Absence of physical injury  9/28/2022 1119 by Erica Luevano RN  Outcome: Progressing  9/28/2022 0513 by Andrea Valdes RN  Outcome: Progressing

## 2022-09-28 NOTE — PROGRESS NOTES
INPATIENT PROGRESS NOTE        IDENTIFYING DATA/REASON FOR CONSULTATION   PATIENT:  Aminata Obrien  MRN:  [id number]  ADMIT DATE: 9/25/2022  TIME OF EVALUATION: 9/28/2022 9:14 AM  HOSPITAL STAY:   LOS: 3 days   CONSULTING PHYSICIAN: Adriana Sagastume MD   REASON FOR CONSULTATION: abdominal pain    Subjective:    Patient seen in follow up  Still with abdominal pain. She did not try the lidocaine patch yesterday. Pain worse at surgical incision site. Had nausea with eating yesterday.   Tolerating popsicles      MEDICATIONS   SCHEDULED:  lidocaine, 1 patch, Daily  oxyCODONE, 20 mg, 2 times per day  0.9 % sodium chloride, 30 mL/kg, Once  lidocaine 1 % injection, 5 mL, Once  gabapentin, 400 mg, TID  atorvastatin, 10 mg, Daily  sodium chloride flush, 5-40 mL, 2 times per day  enoxaparin, 40 mg, Daily  pantoprazole (PROTONIX) 40 mg injection, 40 mg, Daily    FLUIDS/DRIPS:     sodium chloride      sodium chloride 125 mL/hr at 09/28/22 6273     PRNs: simethicone, 80 mg, Q6H PRN  oxyCODONE, 5 mg, Q4H PRN  hydrOXYzine HCl, 25 mg, Q8H PRN  sodium chloride flush, 5-40 mL, PRN  sodium chloride, , PRN  ondansetron, 4 mg, Q8H PRN   Or  ondansetron, 4 mg, Q6H PRN  polyethylene glycol, 17 g, Daily PRN  acetaminophen, 650 mg, Q6H PRN   Or  acetaminophen, 650 mg, Q6H PRN  promethazine, 25 mg, Q6H PRN  diphenhydrAMINE, 25 mg, Q6H PRN    ALLERGIES:    Allergies   Allergen Reactions    Bee Venom Shortness Of Breath and Swelling    Bentyl [Dicyclomine Hcl] Shortness Of Breath and Anxiety    Dicyclomine Anxiety and Shortness Of Breath    Ketorolac Tromethamine Hives and Itching     Injectable only  Tolerates PO NSAIDs fine    Levofloxacin Anxiety and Hives    Maitake Anaphylaxis    Shiitake Mushroom Anaphylaxis     Can not eat mushrooms    Sulfa Antibiotics Shortness Of Breath    Vancomycin Anaphylaxis and Hives    Zosyn [Piperacillin Sod-Tazobactam So] Hives, Shortness Of Breath, Nausea Only and Dizziness or Vertigo    Adhesive Tape Dermatitis     Other reaction(s): Dermatitis    Oxycodone-Acetaminophen Nausea And Vomiting     Tolerates Norco/Vicodin ok  Patient can not tolerate Percocet well. Extreme vomiting      Sulfacetamide Hives    Acetaminophen Anxiety     Patient reports when taking acetaminophen (including Norco/Percocet) she gets severe anxiety when taking this medication. Butalbital-Apap-Caff-Cod Anxiety    Ceftaroline Rash    Daptomycin Swelling and Rash    Fosfomycin Tromethamine Nausea And Vomiting    Haldol [Haloperidol] Anxiety    Ketamine Nausea And Vomiting and Anxiety    Methocarbamol Rash    Morphine Hives    Nitrofurantoin Nausea And Vomiting     \"projectile vomiting\"    Prochlorperazine Anxiety    Reglan [Metoclopramide] Anxiety    Silicone Hives, Swelling and Rash    Tazobactam Nausea And Vomiting and Anxiety         PHYSICAL EXAM   VITALS:  BP (!) 106/49   Pulse 61   Temp (!) 96.7 °F (35.9 °C) (Axillary)   Resp 18   Wt 156 lb 8.4 oz (71 kg)   LMP 10/31/2016 (Exact Date)   SpO2 99%   BMI 31.61 kg/m²   TEMPERATURE:  Current - Temp: (!) 96.7 °F (35.9 °C);  Max - Temp  Av.2 °F (36.8 °C)  Min: 96.7 °F (35.9 °C)  Max: 98.9 °F (37.2 °C)    Physical Exam:  General appearance: alert, cooperative, no distress, appears stated age  Eyes: Anicteric  Head: Normocephalic, without obvious abnormality  Lungs: clear to auscultation bilaterally, Normal Effort  Heart: regular rate and rhythm, normal S1 and S2, no murmurs or rubs  Abdomen: soft, non-distended, ttp without r/g  Extremities: atraumatic, no cyanosis or edema  Skin: warm and dry, no jaundice  Neuro: Grossly intact, A&OX3    LABS AND IMAGING   Laboratory   Recent Labs     22  0700 22  0534 22  0530   WBC 11.0 9.7 7.4   HGB 12.3 11.8* 11.7*   HCT 36.6 35.3* 34.7*   MCV 82.8 84.0 83.5    278 275     Recent Labs     22  0700 22  0534 22  0530    140 141   K 4.0 3.8 3.7    106 105   CO2 24 24 25   BUN 15 20 13 however, inadvertent transcription errors may be present despite my best efforts to edit errors.     Clovis ROA

## 2022-09-28 NOTE — PROGRESS NOTES
Hospitalist Progress Note  9/28/2022 9:16 AM    PCP: Kailash Del Castillo - AARON    [phone]     Date of Admission: 9/25/2022                                                                                                                     HOSPITAL COURSE    Patient demographics:  The patient  Kathi Ny is a 38 y.o. female     Significant past medical history:   Patient Active Problem List   Diagnosis    Attention deficit hyperactivity disorder (ADHD)     (ventriculoperitoneal) shunt status    Scoliosis    Prediabetes    Tobacco smoker    Onychomycosis    Congenital hydrocephalus ([address]    Sepsis without acute organ dysfunction (HCC)    Ureteritis    Acute cystitis without hematuria    History of brain shunt    Mood disorder (HCC)    Numbness and tingling in left hand    Diverticulosis    Colitis    Infection due to extended-spectrum beta-lactamase-producing Escherichia coli    Kidney stone    Intermittent small bowel obstruction due to adhesions ([facility])    Abdominal wall mass    Cyst and pseudocyst of pancreas    Abdominal pain    Nausea and vomiting    COVID         Presenting symptoms:  abdominal pain w/ fevers    Diagnostic workup:      CONSULTS DURING ADMISSION :   IP CONSULT TO GENERAL SURGERY  IP CONSULT TO HOSPITALIST  IP CONSULT TO GENERAL SURGERY  IP CONSULT TO GI      Patient was diagnosed with:  Abdominal pain  Nausea vomiting  COVID: positive        Treatment while inpatient:  Patient presented to the emergency department complaining of abdominal pain and fevers for 9 days. ----------------------------------------------------------      SUBJECTIVE COMPLAINTS- abdominal pain w/ fevers    Diet: ADULT DIET;  Regular      OBJECTIVE:   Patient Active Problem List   Diagnosis    Attention deficit hyperactivity disorder (ADHD)     (ventriculoperitoneal) shunt status    Scoliosis    Prediabetes    Tobacco smoker Onychomycosis    Congenital hydrocephalus (HCC)    Sepsis without acute organ dysfunction (HCC)    Ureteritis    Acute cystitis without hematuria    History of brain shunt    Mood disorder (HCC)    Numbness and tingling in left hand    Diverticulosis    Colitis    Infection due to extended-spectrum beta-lactamase-producing Escherichia coli    Kidney stone    Intermittent small bowel obstruction due to adhesions (HCC)    Abdominal wall mass    Cyst and pseudocyst of pancreas    Abdominal pain    Nausea and vomiting    COVID       Allergies  Bee venom, Bentyl [dicyclomine hcl], Dicyclomine, Ketorolac tromethamine, Levofloxacin, Maitake, Shiitake mushroom, Sulfa antibiotics, Vancomycin, Zosyn [piperacillin sod-tazobactam so], Adhesive tape, Oxycodone-acetaminophen, Sulfacetamide, Acetaminophen, Butalbital-apap-caff-cod, Ceftaroline, Daptomycin, Fosfomycin tromethamine, Haldol [haloperidol], Ketamine, Methocarbamol, Morphine, Nitrofurantoin, Prochlorperazine, Reglan [metoclopramide], Silicone, and Tazobactam    Medications    Scheduled Meds:   lidocaine  1 patch TransDERmal Daily    oxyCODONE  20 mg Oral 2 times per day    0.9 % sodium chloride  30 mL/kg IntraVENous Once    lidocaine 1 % injection  5 mL IntraDERmal Once    gabapentin  400 mg Oral TID    atorvastatin  10 mg Oral Daily    sodium chloride flush  5-40 mL IntraVENous 2 times per day    enoxaparin  40 mg SubCUTAneous Daily    pantoprazole (PROTONIX) 40 mg injection  40 mg IntraVENous Daily     Continuous Infusions:   sodium chloride      sodium chloride 125 mL/hr at 09/28/22 0842     PRN Meds:  simethicone, oxyCODONE, hydrOXYzine HCl, sodium chloride flush, sodium chloride, ondansetron **OR** ondansetron, polyethylene glycol, acetaminophen **OR** acetaminophen, promethazine, diphenhydrAMINE    Vitals   Vitals /wt Patient Vitals for the past 8 hrs:   BP Temp Temp src Pulse Resp SpO2 Weight   09/28/22 0811 (!) 106/49 (!) 96.7 °F (35.9 °C) Axillary 61 18 99 % -- 09/28/22 0513 -- -- -- -- -- -- 156 lb 8.4 oz (71 kg)   09/28/22 0352 125/79 97.9 °F (36.6 °C) Oral 57 16 98 % --   09/28/22 0320 -- -- -- -- 16 -- --        72HR INTAKE/OUTPUT:  No intake or output data in the 24 hours ending 09/28/22 0916      Exam:    Gen:   Alert and oriented ×3    Eyes: PERRL. No sclera icterus. No conjunctival injection. ENT: No discharge. Pharynx clear. External appearance of ears and nose normal.  Neck: Trachea midline. No obvious mass. Resp: No accessory muscle use. No crackles. No wheezes. No rhonchi. CV: Regular rate. Regular rhythm. No murmur or rub. No edema. GI: Non-tender. Non-distended. No hernia. Skin: Warm, dry, normal texture and turgor. Lymph: No cervical LAD. No supraclavicular LAD. M/S: / Ext. No cyanosis. No clubbing. No joint deformity. Neuro: CN 2-12 are intact,  no neurologic deficits noted. PT/INR: No results for input(s): PROTIME, INR in the last 72 hours. APTT: No results for input(s): APTT in the last 72 hours. CBC:   Recent Labs     09/25/22  1513 09/26/22  0700 09/27/22  0534 09/28/22  0530   WBC 14.8* 11.0 9.7 7.4   HGB 15.3 12.3 11.8* 11.7*   HCT 45.3 36.6 35.3* 34.7*   MCV 82.7 82.8 84.0 83.5    340 278 275       BMP:   Recent Labs     09/25/22  1513 09/26/22  0700 09/27/22  0534 09/28/22  0530    142 140 141   K 3.8 4.0 3.8 3.7    108 106 105   CO2 21 24 24 25   BUN 12 15 20 13   CREATININE 0.8 0.6 0.8 0.6       LIVER PROFILE:   Recent Labs     09/25/22  1513   ALKPHOS 171*   AST 17   ALT 34   BILITOT 0.4     No results for input(s): AMYLASE in the last 72 hours. Recent Labs     09/25/22  1513   LIPASE 17.0       UA:No results for input(s): NITRITE, LABCAST, WBCUA, RBCUA, MUCUS in the last 72 hours. TROPONIN: No results for input(s): [place] in the last 72 hours.     Lab Results   Component Value Date/Time    URRFLXCULT Not Indicated 09/25/2022 03:16 PM       No results for input(s): TSHREFLEX in the last 72 hours. No components found for: [id number]  POC GLUCOSE:    Recent Labs     09/27/22  0754 09/27/22  1200 09/27/22  1635 09/27/22 2001 09/28/22  0814   POCGLU 107* 100* 110* 122* 82     No results for input(s): LABA1C in the last 72 hours. Lab Results   Component Value Date/Time    LABA1C 6.3 08/24/2022 06:22 AM         ASSESSMENT AND PLAN    Abdominal pain:   Status post lysis of adhesions and subcutaneous mass removal   September 14, 2022, pod 14  known history of chronic abdominal pain. CT abdomen and pelvis with and without contrast unremarkable,  General surgery is following    Patient is afebrile. No hypotension. We will continue cefepime-> de-escalate if WBC and LA trend down, again presentation may simply be covid related       Nausea vomiting:   Has a history of chronic issues regarding nausea vomiting but also may be triggered by Covid  Phenergan and Benadryl IV  Pepcid IV and Protonix IV  IVF:  ml/hr  Clear liquids  GI consult is appreciated  Follow-up: MRCP  Continue with proton pump inhibitors     Chronic pain:   Continue Oxy IR add oxycontin  Despite patient stating that she has not tolerated her pain medication for the last 9 days there is no evidence or clinical indicators of withdrawal  no IV narcotics for this patient until there is a medical indication for such     COVID:   positive on 9/21:   currently no respiratory symptoms. Code Status: Full Code        Dispo - cc        The patient and / or the family were informed of the results of any tests, a time was given to answer questions, a plan was proposed and they agreed with plan. Evelin Landry MD    This note was transcribed using [id number] [address]. Please disregard any translational errors.

## 2022-09-28 NOTE — PLAN OF CARE
Problem: Discharge Planning  Goal: Discharge to home or other facility with appropriate resources  Outcome: Progressing     Problem: Pain  Goal: Verbalizes/displays adequate comfort level or baseline comfort level  Outcome: Progressing     Problem: Safety - Adult  Goal: Free from fall injury  Outcome: Progressing     Problem: Respiratory - Adult  Goal: Achieves optimal ventilation and oxygenation  Outcome: Progressing     Problem: Skin/Tissue Integrity - Adult  Goal: Skin integrity remains intact  Outcome: Progressing  Goal: Incisions, wounds, or drain sites healing without S/S of infection  Outcome: Progressing     Problem: Gastrointestinal - Adult  Goal: Minimal or absence of nausea and vomiting  Outcome: Progressing  Goal: Maintains or returns to baseline bowel function  Outcome: Progressing  Goal: Maintains adequate nutritional intake  Outcome: Progressing     Problem: Metabolic/Fluid and Electrolytes - Adult  Goal: Electrolytes maintained within normal limits  Outcome: Progressing  Goal: Hemodynamic stability and optimal renal function maintained  Outcome: Progressing     Problem: Chronic Conditions and Co-morbidities  Goal: Patient's chronic conditions and co-morbidity symptoms are monitored and maintained or improved  Outcome: Progressing     Problem: ABCDS Injury Assessment  Goal: Absence of physical injury  Outcome: Progressing

## 2022-09-28 NOTE — PROGRESS NOTES
Throughout the evening pt was able to keep at least 6 ice creams and several popsicles down with no complaints of nausea. She is reporting some itching.

## 2022-09-29 LAB
ANION GAP SERPL CALCULATED.3IONS-SCNC: 9 MMOL/L (ref 3–16)
BUN BLDV-MCNC: 12 MG/DL (ref 7–20)
CALCIUM SERPL-MCNC: 8.4 MG/DL (ref 8.3–10.6)
CHLORIDE BLD-SCNC: 102 MMOL/L (ref 99–110)
CO2: 29 MMOL/L (ref 21–32)
CREAT SERPL-MCNC: 0.7 MG/DL (ref 0.6–1.1)
GFR AFRICAN AMERICAN: >60
GFR NON-AFRICAN AMERICAN: >60
GLUCOSE BLD-MCNC: 108 MG/DL (ref 70–99)
GLUCOSE BLD-MCNC: 121 MG/DL (ref 70–99)
GLUCOSE BLD-MCNC: 126 MG/DL (ref 70–99)
GLUCOSE BLD-MCNC: 87 MG/DL (ref 70–99)
GLUCOSE BLD-MCNC: 97 MG/DL (ref 70–99)
HCT VFR BLD CALC: 33.7 % (ref 36–48)
HEMOGLOBIN: 11.5 G/DL (ref 12–16)
MCH RBC QN AUTO: 28.4 PG (ref 26–34)
MCHC RBC AUTO-ENTMCNC: 34.2 G/DL (ref 31–36)
MCV RBC AUTO: 83 FL (ref 80–100)
PDW BLD-RTO: 15 % (ref 12.4–15.4)
PERFORMED ON: ABNORMAL
PERFORMED ON: ABNORMAL
PERFORMED ON: NORMAL
PERFORMED ON: NORMAL
PLATELET # BLD: 256 K/UL (ref 135–450)
PMV BLD AUTO: 7.3 FL (ref 5–10.5)
POTASSIUM SERPL-SCNC: 3.6 MMOL/L (ref 3.5–5.1)
RBC # BLD: 4.06 M/UL (ref 4–5.2)
SODIUM BLD-SCNC: 140 MMOL/L (ref 136–145)
WBC # BLD: 7.6 K/UL (ref 4–11)

## 2022-09-29 PROCEDURE — 6360000002 HC RX W HCPCS: Performed by: NURSE PRACTITIONER

## 2022-09-29 PROCEDURE — 6370000000 HC RX 637 (ALT 250 FOR IP): Performed by: HOSPITALIST

## 2022-09-29 PROCEDURE — 6370000000 HC RX 637 (ALT 250 FOR IP): Performed by: NURSE PRACTITIONER

## 2022-09-29 PROCEDURE — 80048 BASIC METABOLIC PNL TOTAL CA: CPT

## 2022-09-29 PROCEDURE — C9113 INJ PANTOPRAZOLE SODIUM, VIA: HCPCS | Performed by: NURSE PRACTITIONER

## 2022-09-29 PROCEDURE — 85027 COMPLETE CBC AUTOMATED: CPT

## 2022-09-29 PROCEDURE — 2580000003 HC RX 258: Performed by: NURSE PRACTITIONER

## 2022-09-29 PROCEDURE — 1200000000 HC SEMI PRIVATE

## 2022-09-29 PROCEDURE — 94760 N-INVAS EAR/PLS OXIMETRY 1: CPT

## 2022-09-29 RX ORDER — OXYCODONE HYDROCHLORIDE 10 MG/1
10 TABLET ORAL EVERY 4 HOURS PRN
Status: DISCONTINUED | OUTPATIENT
Start: 2022-09-29 | End: 2022-09-30 | Stop reason: HOSPADM

## 2022-09-29 RX ADMIN — Medication 25 MG: at 22:31

## 2022-09-29 RX ADMIN — OXYCODONE 5 MG: 5 TABLET ORAL at 05:17

## 2022-09-29 RX ADMIN — ONDANSETRON 4 MG: 2 INJECTION INTRAMUSCULAR; INTRAVENOUS at 17:41

## 2022-09-29 RX ADMIN — OXYCODONE HYDROCHLORIDE 10 MG: 10 TABLET ORAL at 21:12

## 2022-09-29 RX ADMIN — DIPHENHYDRAMINE HYDROCHLORIDE 25 MG: 50 INJECTION, SOLUTION INTRAMUSCULAR; INTRAVENOUS at 21:14

## 2022-09-29 RX ADMIN — GABAPENTIN 400 MG: 400 CAPSULE ORAL at 21:12

## 2022-09-29 RX ADMIN — OXYCODONE 5 MG: 5 TABLET ORAL at 01:04

## 2022-09-29 RX ADMIN — SODIUM CHLORIDE: 9 INJECTION, SOLUTION INTRAVENOUS at 20:34

## 2022-09-29 RX ADMIN — HYDROXYZINE HYDROCHLORIDE 25 MG: 25 TABLET, FILM COATED ORAL at 17:42

## 2022-09-29 RX ADMIN — ENOXAPARIN SODIUM 40 MG: 100 INJECTION SUBCUTANEOUS at 09:13

## 2022-09-29 RX ADMIN — OXYCODONE 5 MG: 5 TABLET ORAL at 11:37

## 2022-09-29 RX ADMIN — GABAPENTIN 400 MG: 400 CAPSULE ORAL at 09:13

## 2022-09-29 RX ADMIN — OXYCODONE HYDROCHLORIDE 10 MG: 10 TABLET ORAL at 17:42

## 2022-09-29 RX ADMIN — ONDANSETRON 4 MG: 2 INJECTION INTRAMUSCULAR; INTRAVENOUS at 09:13

## 2022-09-29 RX ADMIN — GABAPENTIN 400 MG: 400 CAPSULE ORAL at 13:09

## 2022-09-29 RX ADMIN — OXYCODONE 5 MG: 5 TABLET ORAL at 15:50

## 2022-09-29 RX ADMIN — Medication 40 MG: at 09:13

## 2022-09-29 RX ADMIN — ATORVASTATIN CALCIUM 10 MG: 10 TABLET, FILM COATED ORAL at 09:14

## 2022-09-29 RX ADMIN — SODIUM CHLORIDE, PRESERVATIVE FREE 10 ML: 5 INJECTION INTRAVENOUS at 09:14

## 2022-09-29 RX ADMIN — OXYCODONE HYDROCHLORIDE 20 MG: 10 TABLET, FILM COATED, EXTENDED RELEASE ORAL at 09:13

## 2022-09-29 RX ADMIN — SODIUM CHLORIDE: 9 INJECTION, SOLUTION INTRAVENOUS at 04:20

## 2022-09-29 RX ADMIN — Medication 25 MG: at 11:28

## 2022-09-29 RX ADMIN — DIPHENHYDRAMINE HYDROCHLORIDE 25 MG: 50 INJECTION, SOLUTION INTRAMUSCULAR; INTRAVENOUS at 11:28

## 2022-09-29 ASSESSMENT — PAIN DESCRIPTION - LOCATION
LOCATION: ABDOMEN

## 2022-09-29 ASSESSMENT — PAIN SCALES - GENERAL
PAINLEVEL_OUTOF10: 9
PAINLEVEL_OUTOF10: 9
PAINLEVEL_OUTOF10: 8

## 2022-09-29 ASSESSMENT — PAIN - FUNCTIONAL ASSESSMENT: PAIN_FUNCTIONAL_ASSESSMENT: ACTIVITIES ARE NOT PREVENTED

## 2022-09-29 ASSESSMENT — PAIN DESCRIPTION - DESCRIPTORS: DESCRIPTORS: ACHING

## 2022-09-29 ASSESSMENT — PAIN DESCRIPTION - ORIENTATION: ORIENTATION: RIGHT;LOWER

## 2022-09-29 NOTE — PROGRESS NOTES
Patient c/o of pain and burning in lower mid abdominal lap site. Dressing removed, lap site is open with serosanguinous and purulent drainage. Clean dressing applied. Pt advised of importance of leaving dressing intact and not touching the incision.

## 2022-09-29 NOTE — PROGRESS NOTES
Hospitalist Progress Note  9/29/2022 11:34 AM    PCP: Anastacia Loza CNP    [phone]     Date of Admission: 9/25/2022                                                                                                                     HOSPITAL COURSE    Patient demographics:  The patient  Amalia German is a 38 y.o. female     Significant past medical history:   Patient Active Problem List   Diagnosis    Attention deficit hyperactivity disorder (ADHD)     (ventriculoperitoneal) shunt status    Scoliosis    Prediabetes    Tobacco smoker    Onychomycosis    Congenital hydrocephalus ([facility] [address]    Sepsis without acute organ dysfunction (HCC)    Ureteritis    Acute cystitis without hematuria    History of brain shunt    Mood disorder (HCC)    Numbness and tingling in left hand    Diverticulosis    Colitis    Infection due to extended-spectrum beta-lactamase-producing Escherichia coli    Kidney stone    Intermittent small bowel obstruction due to adhesions (HCC)    Abdominal wall mass    Cyst and pseudocyst of pancreas    Abdominal pain    Nausea and vomiting    COVID         Presenting symptoms:  abdominal pain w/ fevers    Diagnostic workup:      CONSULTS DURING ADMISSION :   IP CONSULT TO GENERAL SURGERY  IP CONSULT TO HOSPITALIST  IP CONSULT TO GENERAL SURGERY  IP CONSULT TO GI      Patient was diagnosed with:  Abdominal pain  Nausea vomiting  COVID: positive        Treatment while inpatient:  38years old female, with medical history significant for lysis of adhesions and subcutaneous mass removal on September 14, 2022. Patient also has a known history of for all chronic abdominal pain. CT scan of the abdomen and pelvis was done without contrast that was unremarkable. Both GI and general surgery followed the patient and no further intervention is recommended. GI recommended to use lidocaine abdominal wall patch and proton pump inhibitors .   Patient's a lower abdominal laparoscopic wound was examined by surgery it was covered with Tegaderm. Patient was advised not to pull of the dressing.                                                                                  ----------------------------------------------------------      SUBJECTIVE COMPLAINTS- abdominal pain w/ fevers    Diet: ADULT DIET;  Regular      OBJECTIVE:   Patient Active Problem List   Diagnosis    Attention deficit hyperactivity disorder (ADHD)     (ventriculoperitoneal) shunt status    Scoliosis    Prediabetes    Tobacco smoker    Onychomycosis    Congenital hydrocephalus (HCC)    Sepsis without acute organ dysfunction (HCC)    Ureteritis    Acute cystitis without hematuria    History of brain shunt    Mood disorder (HCC)    Numbness and tingling in left hand    Diverticulosis    Colitis    Infection due to extended-spectrum beta-lactamase-producing Escherichia coli    Kidney stone    Intermittent small bowel obstruction due to adhesions (HCC)    Abdominal wall mass    Cyst and pseudocyst of pancreas    Abdominal pain    Nausea and vomiting    COVID       Allergies  Bee venom, Bentyl [dicyclomine hcl], Dicyclomine, Ketorolac tromethamine, Levofloxacin, Maitake, Shiitake mushroom, Sulfa antibiotics, Vancomycin, Zosyn [piperacillin sod-tazobactam so], Adhesive tape, Oxycodone-acetaminophen, Sulfacetamide, Acetaminophen, Butalbital-apap-caff-cod, Ceftaroline, Daptomycin, Fosfomycin tromethamine, Haldol [haloperidol], Ketamine, Methocarbamol, Morphine, Nitrofurantoin, Prochlorperazine, Reglan [metoclopramide], Silicone, and Tazobactam    Medications    Scheduled Meds:   lidocaine  1 patch TransDERmal Daily    oxyCODONE  20 mg Oral 2 times per day    0.9 % sodium chloride  30 mL/kg IntraVENous Once    lidocaine 1 % injection  5 mL IntraDERmal Once    gabapentin  400 mg Oral TID    atorvastatin  10 mg Oral Daily    sodium chloride flush  5-40 mL IntraVENous 2 times per day    enoxaparin  40 mg SubCUTAneous Daily    pantoprazole (PROTONIX) 40 mg injection  40 mg IntraVENous Daily     Continuous Infusions:   sodium chloride      sodium chloride 125 mL/hr at 09/29/22 0420     PRN Meds:  simethicone, oxyCODONE, hydrOXYzine HCl, sodium chloride flush, sodium chloride, ondansetron **OR** ondansetron, polyethylene glycol, acetaminophen **OR** acetaminophen, promethazine, diphenhydrAMINE    Vitals   Vitals /wt Patient Vitals for the past 8 hrs:   BP Temp Temp src Pulse Resp SpO2 Weight   09/29/22 1038 -- -- -- -- -- 98 % --   09/29/22 0900 118/75 97.1 °F (36.2 °C) Oral 70 16 -- --   09/29/22 0710 -- -- -- -- -- -- 153 lb (69.4 kg)   09/29/22 0517 -- -- -- -- 18 -- --        72HR INTAKE/OUTPUT:  No intake or output data in the 24 hours ending 09/29/22 1134      Exam:    Gen:   Alert and oriented ×3    Eyes: PERRL. No sclera icterus. No conjunctival injection. ENT: No discharge. Pharynx clear. External appearance of ears and nose normal.  Neck: Trachea midline. No obvious mass. Resp: No accessory muscle use. No crackles. No wheezes. No rhonchi. CV: Regular rate. Regular rhythm. No murmur or rub. No edema. GI: Non-tender. Non-distended. No hernia. Skin: Warm, dry, normal texture and turgor. Lymph: No cervical LAD. No supraclavicular LAD. M/S: / Ext. No cyanosis. No clubbing. No joint deformity. Neuro: CN 2-12 are intact,  no neurologic deficits noted. PT/INR: No results for input(s): PROTIME, INR in the last 72 hours. APTT: No results for input(s): APTT in the last 72 hours. CBC:   Recent Labs     09/27/22  0534 09/28/22  0530 09/29/22  0518   WBC 9.7 7.4 7.6   HGB 11.8* 11.7* 11.5*   HCT 35.3* 34.7* 33.7*   MCV 84.0 83.5 83.0    275 256       BMP:   Recent Labs     09/27/22  0534 09/28/22  0530 09/29/22  0518    141 140   K 3.8 3.7 3.6    105 102   CO2 24 25 29   BUN 20 13 12   CREATININE 0.8 0.6 0.7       LIVER PROFILE:   No results for input(s): ALKPHOS, AST, ALT, ALB, BILIDIR, BILITOT, ALKPHOS in the last 72 hours.     No results for input(s): AMYLASE in the last 72 hours. No results for input(s): LIPASE in the last 72 hours. UA:No results for input(s): NITRITE, LABCAST, WBCUA, RBCUA, MUCUS in the last 72 hours. TROPONIN: No results for input(s): [place] in the last 72 hours. Lab Results   Component Value Date/Time    URRFLXCULT Not Indicated 09/25/2022 03:16 PM       No results for input(s): TSHREFLEX in the last 72 hours. No components found for: [id number]  POC GLUCOSE:    Recent Labs     09/28/22  0814 09/28/22  1128 09/28/22 2006 09/29/22  0749 09/29/22  1059   POCGLU 82 145* 117* 97 87     No results for input(s): LABA1C in the last 72 hours. Lab Results   Component Value Date/Time    LABA1C 6.3 08/24/2022 06:22 AM         ASSESSMENT AND PLAN    Abdominal pain:   Status post lysis of adhesions and subcutaneous mass removal   September 14, 2022, pod 14  known history of chronic abdominal pain. CT abdomen and pelvis with and without contrast unremarkable,  Patient continues to complain of severe abdominal pain  Patient is not spiking temperature and WBC count is not elevated  Check procalcitonin       Nausea vomiting:   Nausea vomiting is better     Chronic pain:   Continue Oxy IR add oxycontin  Despite patient stating that she has not tolerated her pain medication for the last 9 days there is no evidence or clinical indicators of withdrawal  no IV narcotics for this patient until there is a medical indication for such     COVID:   positive on 9/21:   currently no respiratory symptoms. Code Status: Full Code        Dispo - cc        The patient and / or the family were informed of the results of any tests, a time was given to answer questions, a plan was proposed and they agreed with plan. Juliet Vines MD    This note was transcribed using [id number] [place]. Please disregard any translational errors.

## 2022-09-29 NOTE — PROGRESS NOTES
At the beginning of the shift a sterile guaze was placed on the lap site to the mid abdomen of this pt. It was covered in tegaderm. Pt continues to pick off the dressings that are placed on this site and is constantly pulling her belly up to look at the site and touch and push on her stomach. This RN advised the pt that she should not be doing such things and she responded \"ya I know, its just so tender, and im so worried about getting an infection\". Again the site was redressed with sterile non adherent guaze and paper tape as the pt is allergic to adhesives. She was again advised to leave it alone and to not pick off the dressing. She voiced agreement.

## 2022-09-30 VITALS
SYSTOLIC BLOOD PRESSURE: 131 MMHG | RESPIRATION RATE: 18 BRPM | OXYGEN SATURATION: 96 % | DIASTOLIC BLOOD PRESSURE: 85 MMHG | BODY MASS INDEX: 32.06 KG/M2 | TEMPERATURE: 98.3 F | HEART RATE: 76 BPM | WEIGHT: 158.73 LBS

## 2022-09-30 LAB
BLOOD CULTURE, ROUTINE: NORMAL
CULTURE, BLOOD 2: NORMAL
GLUCOSE BLD-MCNC: 138 MG/DL (ref 70–99)
PERFORMED ON: ABNORMAL

## 2022-09-30 PROCEDURE — 6370000000 HC RX 637 (ALT 250 FOR IP): Performed by: NURSE PRACTITIONER

## 2022-09-30 PROCEDURE — C9113 INJ PANTOPRAZOLE SODIUM, VIA: HCPCS | Performed by: NURSE PRACTITIONER

## 2022-09-30 PROCEDURE — 6370000000 HC RX 637 (ALT 250 FOR IP): Performed by: HOSPITALIST

## 2022-09-30 PROCEDURE — 2580000003 HC RX 258: Performed by: NURSE PRACTITIONER

## 2022-09-30 PROCEDURE — 6360000002 HC RX W HCPCS: Performed by: NURSE PRACTITIONER

## 2022-09-30 RX ORDER — OXYCODONE HYDROCHLORIDE 5 MG/1
5 TABLET ORAL EVERY 6 HOURS PRN
Qty: 20 TABLET | Refills: 0 | Status: SHIPPED | OUTPATIENT
Start: 2022-09-30 | End: 2022-10-05

## 2022-09-30 RX ORDER — LIDOCAINE 4 G/G
1 PATCH TOPICAL DAILY
Qty: 1 EACH | Refills: 0 | Status: SHIPPED | OUTPATIENT
Start: 2022-10-01 | End: 2022-10-09

## 2022-09-30 RX ORDER — PROMETHAZINE HYDROCHLORIDE 25 MG/1
25 SUPPOSITORY RECTAL EVERY 6 HOURS PRN
Qty: 20 SUPPOSITORY | Refills: 0 | Status: SHIPPED | OUTPATIENT
Start: 2022-09-30 | End: 2022-10-07

## 2022-09-30 RX ADMIN — OXYCODONE HYDROCHLORIDE 10 MG: 10 TABLET ORAL at 05:44

## 2022-09-30 RX ADMIN — SODIUM CHLORIDE, PRESERVATIVE FREE 10 ML: 5 INJECTION INTRAVENOUS at 08:30

## 2022-09-30 RX ADMIN — Medication 40 MG: at 08:28

## 2022-09-30 RX ADMIN — ONDANSETRON 4 MG: 2 INJECTION INTRAMUSCULAR; INTRAVENOUS at 03:36

## 2022-09-30 RX ADMIN — Medication 25 MG: at 06:16

## 2022-09-30 RX ADMIN — OXYCODONE HYDROCHLORIDE 10 MG: 10 TABLET ORAL at 00:14

## 2022-09-30 RX ADMIN — GABAPENTIN 400 MG: 400 CAPSULE ORAL at 08:28

## 2022-09-30 RX ADMIN — SODIUM CHLORIDE: 9 INJECTION, SOLUTION INTRAVENOUS at 05:47

## 2022-09-30 RX ADMIN — ENOXAPARIN SODIUM 40 MG: 100 INJECTION SUBCUTANEOUS at 08:29

## 2022-09-30 RX ADMIN — ATORVASTATIN CALCIUM 10 MG: 10 TABLET, FILM COATED ORAL at 08:28

## 2022-09-30 RX ADMIN — ONDANSETRON 4 MG: 4 TABLET, ORALLY DISINTEGRATING ORAL at 10:03

## 2022-09-30 RX ADMIN — OXYCODONE HYDROCHLORIDE 10 MG: 10 TABLET ORAL at 10:03

## 2022-09-30 ASSESSMENT — PAIN DESCRIPTION - DESCRIPTORS
DESCRIPTORS: ACHING
DESCRIPTORS: ACHING

## 2022-09-30 ASSESSMENT — PAIN SCALES - GENERAL
PAINLEVEL_OUTOF10: 9
PAINLEVEL_OUTOF10: 9
PAINLEVEL_OUTOF10: 10

## 2022-09-30 ASSESSMENT — PAIN DESCRIPTION - LOCATION
LOCATION: ABDOMEN
LOCATION: ABDOMEN

## 2022-09-30 ASSESSMENT — PAIN DESCRIPTION - ORIENTATION
ORIENTATION: RIGHT;LOWER
ORIENTATION: RIGHT;LOWER

## 2022-09-30 NOTE — CARE COORDINATION
CASE MANAGEMENT DISCHARGE SUMMARY:    DISCHARGE DATE: 09/30/22    DISCHARGED TO HOME     TRANSPORTATION: will have a ride      Denies any needs.           Electronically signed by Saint Reading, RN on 9/30/2022 at 10:44 AM

## 2022-09-30 NOTE — PLAN OF CARE
Pt A/O, denies SOB or dizziness, and VSS. Meds given per eMAR. Pt complained of pain in abdomen and was given PRN oxycodone-IR. Pt was also given PRN phenergan or zofran for nausea. NS infusing @ 125 ml/hr. Pt is resting with no other complaints at this time. Call light within reach.       Problem: Discharge Planning  Goal: Discharge to home or other facility with appropriate resources  Outcome: Progressing  Flowsheets (Taken 9/29/2022 2045)  Discharge to home or other facility with appropriate resources: Identify barriers to discharge with patient and caregiver     Problem: Pain  Goal: Verbalizes/displays adequate comfort level or baseline comfort level  Outcome: Progressing     Problem: Safety - Adult  Goal: Free from fall injury  Outcome: Progressing  Flowsheets (Taken 9/29/2022 2045)  Free From Fall Injury: Instruct family/caregiver on patient safety     Problem: Chronic Conditions and Co-morbidities  Goal: Patient's chronic conditions and co-morbidity symptoms are monitored and maintained or improved  Outcome: Progressing  Flowsheets (Taken 9/29/2022 2045)  Care Plan - Patient's Chronic Conditions and Co-Morbidity Symptoms are Monitored and Maintained or Improved: Monitor and assess patient's chronic conditions and comorbid symptoms for stability, deterioration, or improvement     Problem: Respiratory - Adult  Goal: Achieves optimal ventilation and oxygenation  Outcome: Progressing  Flowsheets (Taken 9/29/2022 2045)  Achieves optimal ventilation and oxygenation:   Assess for changes in respiratory status   Assess for changes in mentation and behavior     Problem: Skin/Tissue Integrity - Adult  Goal: Skin integrity remains intact  Outcome: Progressing  Flowsheets (Taken 9/29/2022 2045)  Skin Integrity Remains Intact: Monitor for areas of redness and/or skin breakdown  Goal: Incisions, wounds, or drain sites healing without S/S of infection  Outcome: Progressing  Flowsheets (Taken 9/29/2022 2045)  Incisions, Wounds, or Drain Sites Healing Without Sign and Symptoms of Infection: Implement wound care per orders     Problem: Gastrointestinal - Adult  Goal: Minimal or absence of nausea and vomiting  Outcome: Progressing  Flowsheets (Taken 9/29/2022 2045)  Minimal or absence of nausea and vomiting:   Administer IV fluids as ordered to ensure adequate hydration   Administer ordered antiemetic medications as needed   Provide nonpharmacologic comfort measures as appropriate  Goal: Maintains or returns to baseline bowel function  Outcome: Progressing  Flowsheets (Taken 9/29/2022 2045)  Maintains or returns to baseline bowel function: Encourage oral fluids to ensure adequate hydration  Goal: Maintains adequate nutritional intake  Outcome: Progressing     Problem: Metabolic/Fluid and Electrolytes - Adult  Goal: Electrolytes maintained within normal limits  Outcome: Progressing  Flowsheets (Taken 9/29/2022 2045)  Electrolytes maintained within normal limits: Monitor labs and assess patient for signs and symptoms of electrolyte imbalances  Goal: Hemodynamic stability and optimal renal function maintained  Outcome: Progressing  Flowsheets (Taken 9/29/2022 2045)  Hemodynamic stability and optimal renal function maintained: Monitor labs and assess for signs and symptoms of volume excess or deficit     Problem: ABCDS Injury Assessment  Goal: Absence of physical injury  Outcome: Progressing  Flowsheets (Taken 9/29/2022 2045)  Absence of Physical Injury: Implement safety measures based on patient assessment

## 2022-09-30 NOTE — CARE COORDINATION
[facility] nurse [facility] - ph:  [phone]  Administrative Assist, Case Management  [phone]  Electronically signed by Mary Piedra on 9/30/2022 at 10:06 AM

## 2022-09-30 NOTE — DISCHARGE INSTR - COC
Continuity of Care Form    Patient Name: Ailyn Sandoval   :  1980  MRN:  [id number]    6 [facility] date:  2022  Discharge date:  2022    Code Status Order: Full Code   Advance Directives:     Admitting Physician:  Mone Dyer DO  PCP: FREDRICK Graves - CNP    Discharging Nurse: [facility] Unit/Room#: B2V-7122/8300-06  Discharging Unit Phone Number: [phone]    Emergency Contact:   Extended Emergency Contact Information  Primary Emergency Contact: Satya Gomez [facility])  Home Phone: [phone]  Relation: Parent  Secondary Emergency Contact: Sukhi Baker [address] [place] [address] Phone: [phone]  Relation: Brother/Sister    Past Surgical History:  Past Surgical History:   Procedure Laterality Date    ABDOMEN SURGERY N/A 2021    REMOVAL OF ABDOMINAL WALL MASS performed by Celine Goetz MD at [facility]      appendicitis     SECTION      placenta previa    CHOLECYSTECTOMY      COLONOSCOPY  2004    COLPOSCOPY      CSF SHUNT      replaced at age 17    CYSTOSCOPY      stone removal    HEMORRHOID SURGERY      HERNIA REPAIR Bilateral     inguinal    HERNIA REPAIR  2015    hiatal hernia    HYSTERECTOMY, TOTAL ABDOMINAL (CERVIX REMOVED)  2016    TAHBSO, adhesions/PCOS    KNEE ARTHROSCOPY Left 2015    medial meniscectomy, chondroplasty, plica resection    LITHOTRIPSY Bilateral 12/10/2019    OTHER SURGICAL HISTORY  10/19/2016    op lap    VENTRAL HERNIA REPAIR N/A 2022    LAPAROSCOPIC LYSIS OF ADHESIONS AND ABDOMINAL WALL MASS REMOVAL performed by Celine Goetz MD at [place] [address]      multiple revisions, most recent        Immunization History:   Immunization History   Administered Date(s) Administered    COVID-19, PFIZER PURPLE top, DILUTE for use, (age 15 y+), 30mcg/0.3mL 11/10/2021    Influenza Vaccine, unspecified formulation 10/19/2013    Influenza Virus Vaccine 11/04/2010, 10/06/2018, 10/30/2020, 11/02/2021       Active Problems:  Patient Active Problem List   Diagnosis Code    Attention deficit hyperactivity disorder (ADHD) F90.9     (ventriculoperitoneal) shunt status Z98.2    Scoliosis M41.9    Prediabetes R73.03    Tobacco smoker Z72.0    Onychomycosis B35.1    Congenital hydrocephalus (HCC) Q03.9    Sepsis without acute organ dysfunction (HCC) A41.9    Ureteritis N28.89    Acute cystitis without hematuria N30.00    History of brain shunt Z98.2    Mood disorder (HCC) F39    Numbness and tingling in left hand R20.0, R20.2    Diverticulosis K57.90    Colitis K52.9    Infection due to extended-spectrum beta-lactamase-producing Escherichia coli A49.8, Z16.12    Kidney stone N20.0    Intermittent small bowel obstruction due to adhesions [facility]) K56.50    Abdominal wall mass R22.2    Cyst and pseudocyst of pancreas K86.2, K86.3    Abdominal pain R10.9    Nausea and vomiting R11.2    COVID U07.1       Isolation/Infection:   Isolation            Droplet Plus  Contact           Unreconciled Outside Infections       External data in this report might not trigger clinical decision support. .      Infection Onset Last Indicated Last Received Source    Infections with existing documentation    ESBL (Extended Spectrum Beta Lactamase) 07/29/22 07/29/22 07/29/22 [employer]      .     Unmapped Infections        Resistant Gram Negative Organisms 01/03/22 12/24/21 12/24/21 [facility]                  Patient Infection Status       Infection Onset Added Last Indicated Last Indicated By Review Planned Expiration Resolved Resolved By    COVID-19 09/21/22 09/21/22 09/21/22 COVID-19, Rapid 10/01/22 10/05/22      ESBL (Extended Spectrum Beta Lactamase) 08/29/22 08/31/22 08/29/22 Culture, Urine        Resolved    COVID-19 (Rule Out) 09/21/22 09/21/22 09/21/22 COVID-19, Rapid (Ordered)   09/21/22 Rule-Out Test Resulted    COVID-19 (Rule Out) 09/21/22 22 COVID-19 & Influenza Combo (Ordered)   22 Rule-Out Test Canceled    COVID-19 (Rule Out) 22 COVID-19, Rapid (Ordered)   22 Rule-Out Test Resulted    COVID-19 (Rule Out) 22 COVID-19, Rapid (Ordered)   22 Rule-Out Test Canceled    COVID-19 (Rule Out) 10/13/21 10/13/21 10/13/21 COVID-19, Rapid (Ordered)   10/13/21 Rule-Out Test Resulted    COVID-19 (Rule Out) 20 COVID-19 (Ordered)   20     ESBL (Extended Spectrum Beta Lactamase) 19 Urine Culture   21 Severo Heller Post, RN    2019 urine  19 urine    Urine culture since has been negative (most recent 2020) with no ESBL in it            Nurse Assessment:  Last Vital Signs: /85   Pulse 76   Temp 98.3 °F (36.8 °C) (Oral)   Resp 18   Wt 158 lb 11.7 oz (72 kg)   LMP 10/31/2016 (Exact Date)   SpO2 96%   BMI 32.06 kg/m²     Last documented pain score (0-10 scale): Pain Level: 9  Last Weight:   Wt Readings from Last 1 Encounters:   22 158 lb 11.7 oz (72 kg)     Mental Status:  oriented    IV Access:  - None    Nursing Mobility/ADLs:  Walking   Independent  Transfer  Independent  Bathing  Independent  Dressing  Independent  Toileting  Independent  Feeding  Independent  Med Admin  Independent  Med Delivery   none    Wound Care Documentation and Therapy:  Incision 22 Abdomen (Active)   Dressing Status Clean;Dry; Intact 22   Incision Cleansed Not Cleansed 22 08   Dressing/Treatment Open to air 22   Closure Open to air 22   Drainage Amount None 22 08   Odor None 22 08   Number of days: 15       Incision 22 Abdomen Lower;Medial (Active)   Dressing Status Clean;Dry; Intact; New dressing applied 22   Incision Cleansed Cleansed with saline 22   Dressing/Treatment Gauze dressing/dressing sponge 22   Closure Other (Comment) 09/30/22 0826   Margins Approximated 09/30/22 0826   Incision Assessment Erythema 09/30/22 0826   Drainage Amount Small 09/30/22 0826   Drainage Description Serosanguinous 09/30/22 0826   Odor None 09/30/22 0826   Mary-incision Assessment Intact 09/30/22 0826   Number of days: 15        Elimination:  Continence: Bowel: Yes  Bladder: Yes  Urinary Catheter: None   Colostomy/Ileostomy/Ileal Conduit: No       Date of Last BM: 9/29/2022    Intake/Output Summary (Last 24 hours) at 9/30/2022 1026  Last data filed at 9/29/2022 2205  Gross per 24 hour   Intake 1555 ml   Output 0 ml   Net 1555 ml     I/O last 3 completed shifts: In: 1162 [P.O.:240; I.V.:1265; IV Piggyback:50]  Out: 0     Safety Concerns:     None    Impairments/Disabilities:      None    Nutrition Therapy:  Current Nutrition Therapy:   - Oral Diet:  General    Routes of Feeding: Oral  Liquids: Thin Liquids  Daily Fluid Restriction: no  Last Modified Barium Swallow with Video (Video Swallowing Test): not done    Treatments at the Time of Hospital Discharge:   Respiratory Treatments: n/a  Oxygen Therapy:  is not on home oxygen therapy.   Ventilator:    - No ventilator support    Rehab Therapies: n/a  Weight Bearing Status/Restrictions: No weight bearing restrictions  Other Medical Equipment (for information only, NOT a DME order):  n/a  Other Treatments: n/a    Patient's personal belongings (please select all that are sent with patient):  None    RN SIGNATURE:  Electronically signed by Bradford San RN on 9/30/22 at 10:27 AM EDT    CASE MANAGEMENT/SOCIAL WORK SECTION    Inpatient Status Date: ***    Readmission Risk Assessment Score:  Readmission Risk              Risk of Unplanned Readmission:  33           Discharging to Facility/ Agency   Name:   Address:  Phone:  Fax:    Dialysis Facility (if applicable)   Name:  Address:  Dialysis Schedule:  Phone:  Fax:    / signature: {Esignature:[id number]}    PHYSICIAN SECTION    Prognosis: {Prognosis:2119982291}    Condition at Discharge: Faisal Cedeno Patient Condition:987814262}    Rehab Potential (if transferring to Rehab): {Prognosis:7637547390}    Recommended Labs or Other Treatments After Discharge: ***    Physician Certification: I certify the above information and transfer of Félix Marino  is necessary for the continuing treatment of the diagnosis listed and that she requires {Admit to Appropriate Level of Care:87070} for {GREATER/LESS:718428507} 30 days.      Update Admission H&P: {CHP DME Changes in [facility]:318760106}    PHYSICIAN SIGNATURE:  {Esignature:[id number]}

## 2022-10-02 NOTE — DISCHARGE SUMMARY
Hospital Medicine Discharge Summary    Patient ID: Pao Reagan      Patient's PCP: Holley Pallas, APRN - CNP    Admit Date: 8/23/2022     Discharge Date: 8/24/2022    Admitting Physician: Kishan Sepulveda MD     Discharge Physician: Kishan Sepulveda MD     Discharge Diagnoses: There are no active hospital problems to display for this patient. The patient was seen and examined on day of discharge and this discharge summary is in conjunction with any daily progress note from day of discharge. Condition at discharge - stable    Hospital Course: patient seen and evaluated on the day of discharge. Patient informed about following up with appointments. Patient verbalized understanding for follow-up appointments. The patient and / or the family were informed of the results of tests, a time was given to answer questions, a plan was proposed and they agreed with plan. Medical reconciliation performed. Patient discharged stable condition. On the date of discharge, the patient reported feeling stable. The patient was found to not be in any acute distress, with vital signs within normal limits, and no new abnormalities on physical examination. Further, the patient expressed appropriate understanding of, and agreement with, the discharge recommendations, medications, and plan. Patient is a 81-year-old female with past medical history of ADHD diabetes mellitus, ESBL who presents to the hospital for nausea vomiting. According to patient she has been having generalized abdominal pain which moved towards her lower abdomen, 7/10 intensity, not radiating improved with pain medication. Patient mentions she also has noticed decreased urine output in her urine bags, she is concerned she may have a obstruction, she has Guzman catheter at home due to history of passing kidney stones.      Assessment  Urinary tract infection  History of ESBL bacteremia  Diabetes mellitus  ADHD    Stable for discharge  All acute issues resolved at time of discharge, patient wishes to be discharge    Exam:     /71   Pulse 68   Temp 97.9 °F (36.6 °C) (Oral)   Resp 16   Ht 4' 11\" (1.499 m)   Wt 158 lb 15.2 oz (72.1 kg)   LMP 10/31/2016 (Exact Date)   SpO2 96%   BMI 32.10 kg/m²     General appearance: No apparent distress  HEENT:  Conjunctivae/corneas clear. Neck: Supple, No jugular venous distention. Respiratory:  Normal respiratory effort. Clear to auscultation, bilaterally without Rales/Wheezes/Rhonchi. Cardiovascular: Regular rate and rhythm with normal S1/S2 without murmurs, rubs or gallops. Abdomen: Soft, non-tender, non-distended, normal bowel sounds. Musculoskelatal: No clubbing, cyanosis or edema bilaterally. Skin: Skin color, texture, turgor normal.   Neurologic: no focal neurologic deficits. grossly non-focal.  Psychiatric: Alert and oriented, normal mood    Consults:     IP CONSULT TO UROLOGY      Code Status:  Prior    Activity: activity as tolerated    Labs: For convenience and continuity at follow-up the following most recent labs are provided:      CBC:    Lab Results   Component Value Date/Time    WBC 7.6 09/29/2022 05:18 AM    HGB 11.5 09/29/2022 05:18 AM    HCT 33.7 09/29/2022 05:18 AM     09/29/2022 05:18 AM       Renal:    Lab Results   Component Value Date/Time     09/29/2022 05:18 AM    K 3.6 09/29/2022 05:18 AM    K 4.0 09/26/2022 07:00 AM     09/29/2022 05:18 AM    CO2 29 09/29/2022 05:18 AM    BUN 12 09/29/2022 05:18 AM    CREATININE 0.7 09/29/2022 05:18 AM    CALCIUM 8.4 09/29/2022 05:18 AM    PHOS 3.8 09/02/2022 07:19 AM       Discharge Medications:     Discharge Medication List as of 8/24/2022  1:44 PM             Details   gabapentin (NEURONTIN) 400 MG capsule Take 400 mg by mouth 3 times daily. Historical Med      oxyCODONE (ROXICODONE) 5 MG immediate release tablet Take 5 mg by mouth every 6 hours as needed for Pain. Historical Med      promethazine (PHENERGAN) 25 MG tablet Take 1 tablet by mouth every 6 hours as needed for Nausea, Disp-12 tablet, R-0Print      VYVANSE 50 MG capsule Take 1 capsule by mouth every morning for 30 days. Take 50 mg by mouth every morning., Disp-30 capsule, R-0, DAWNormal             Time Spent on discharge is more than 30 mints in the examination, evaluation, counseling and review of medications and discharge plan. Signed:    Erin Halsted, MD   10/2/2022      Thank you FREDRICK Ferrari CNP for the opportunity to be involved in this patient's care. If you have any questions or concerns please feel free to contact me at [phone].

## 2022-10-02 NOTE — DISCHARGE SUMMARY
Hospital Medicine Discharge Summary      Patient ID: Andra Henriquez [id number]     Patient's PCP: FREDRICK Cobos CNP    Admit Date: 9/25/2022     Discharge Date: 9/30/2022      Admitting Physician: Maxi Chandler DO    Discharge Physician: Pato Swartz MD     Discharge Diagnoses: Active Hospital Problems    Diagnosis Date Noted    Abdominal pain [R10.9] 09/25/2022     Priority: Medium    Nausea and vomiting [R11.2] 09/25/2022     Priority: Medium    COVID [U07.1] 09/25/2022     Priority: Medium    Sepsis without acute organ dysfunction ([facility] [A41.9] 06/30/2018         The patient was seen and examined on the day of discharge and this discharge summary is in conjunction with any daily progress note from day of discharge.     HOSPITAL COURSE    Patient demographics:  The patient  Shubham Padron is a 38 y.o. female      Significant past medical history:       Patient Active Problem List   Diagnosis    Attention deficit hyperactivity disorder (ADHD)     (ventriculoperitoneal) shunt status    Scoliosis    Prediabetes    Tobacco smoker    Onychomycosis    Congenital hydrocephalus ([facility]    Sepsis without acute organ dysfunction (HCC)    Ureteritis    Acute cystitis without hematuria    History of brain shunt    Mood disorder (HCC)    Numbness and tingling in left hand    Diverticulosis    Colitis    Infection due to extended-spectrum beta-lactamase-producing Escherichia coli    Kidney stone    Intermittent small bowel obstruction due to adhesions (HCC)    Abdominal wall mass    Cyst and pseudocyst of pancreas    Abdominal pain    Nausea and vomiting    COVID            Presenting symptoms:  abdominal pain w/ fevers     Diagnostic workup:        CONSULTS DURING ADMISSION :   IP CONSULT TO GENERAL SURGERY  IP CONSULT TO HOSPITALIST  IP CONSULT TO GENERAL SURGERY  IP CONSULT TO GI        Patient was diagnosed with:  Abdominal pain  Nausea vomiting  COVID: positive           Treatment while inpatient:  38years old female, with medical history significant for lysis of adhesions and subcutaneous mass removal on September 14, 2022. Patient also has a known history of for all chronic abdominal pain. CT scan of the abdomen and pelvis was done without contrast that was unremarkable. Both GI and general surgery followed the patient and no further intervention is recommended. GI recommended to use lidocaine abdominal wall patch and proton pump inhibitors . Patient's a lower abdominal laparoscopic wound was examined by surgery it was covered with Tegaderm. Patient was advised not to pull of the dressing. Labs: For convenience and continuity at follow-up the following most recent labs are provided:      CBC:   Lab Results   Component Value Date/Time    WBC 7.6 09/29/2022 05:18 AM    HGB 11.5 09/29/2022 05:18 AM    HCT 33.7 09/29/2022 05:18 AM     09/29/2022 05:18 AM       RENAL:   Lab Results   Component Value Date/Time     09/29/2022 05:18 AM    K 3.6 09/29/2022 05:18 AM    K 4.0 09/26/2022 07:00 AM     09/29/2022 05:18 AM    CO2 29 09/29/2022 05:18 AM    BUN 12 09/29/2022 05:18 AM    CREATININE 0.7 09/29/2022 05:18 AM           Discharge Medications:      Medication List        START taking these medications      lidocaine 4 % external patch  Place 1 patch onto the skin daily            CHANGE how you take these medications      Vyvanse 50 MG capsule  Generic drug: lisdexamfetamine  Take 1 capsule by mouth every morning for 30 days. Take 50 mg by mouth every morning.   What changed: additional instructions            CONTINUE taking these medications      albuterol sulfate  (90 Base) MCG/ACT inhaler  Commonly known as: Ventolin HFA  Inhale 2 puffs into the lungs 4 times daily as needed for Wheezing     brompheniramine-pseudoephedrine-DM 2-30-10 MG/5ML syrup  Take 5 mLs by mouth 4 times daily as needed for Cough or Congestion     gabapentin 400 MG capsule  Commonly known as: NEURONTIN     hydrOXYzine HCl 25 MG tablet  Commonly known as: ATARAX  Take 1 tablet by mouth every 8 hours as needed for Anxiety     oxyCODONE 5 MG immediate release tablet  Commonly known as: ROXICODONE  Take 1 tablet by mouth every 6 hours as needed for Pain for up to 5 days. promethazine 25 MG suppository  Commonly known as: Promethegan  Place 1 suppository rectally every 6 hours as needed for Nausea     simvastatin 10 MG tablet  Commonly known as: ZOCOR            STOP taking these medications      benzonatate 100 MG capsule  Commonly known as: TESSALON     methylPREDNISolone 4 MG tablet  Commonly known as: MEDROL DOSEPACK     ondansetron 4 MG disintegrating tablet  Commonly known as: Zofran ODT               Where to Get Your Medications        You can get these medications from any pharmacy    Bring a paper prescription for each of these medications  lidocaine 4 % external patch  oxyCODONE 5 MG immediate release tablet  promethazine 25 MG suppository            Time Spent on discharge is more than 30 min in the examination, evaluation, counseling and review of medications and discharge plan. Signed:  Amee Temple MD   10/2/2022      Thank you FREDRICK Graves CNP for the opportunity to be involved in this patient's care. If you have any questions or concerns please feel free to contact me at [phone]. This note was transcribed using [id number] [employer]. Please disregard any translational errors.

## 2022-10-03 ENCOUNTER — HOSPITAL ENCOUNTER (EMERGENCY)
Age: 42
Discharge: HOME | End: 2022-10-03
Attending: EMERGENCY MEDICINE
Payer: COMMERCIAL

## 2022-10-03 ENCOUNTER — CARE COORDINATION (OUTPATIENT)
Dept: CASE MANAGEMENT | Age: 42
End: 2022-10-03

## 2022-10-03 ENCOUNTER — APPOINTMENT (OUTPATIENT)
Dept: CT IMAGING | Age: 42
End: 2022-10-03
Payer: COMMERCIAL

## 2022-10-03 VITALS
TEMPERATURE: 97.9 F | HEIGHT: 59 IN | HEART RATE: 88 BPM | SYSTOLIC BLOOD PRESSURE: 122 MMHG | RESPIRATION RATE: 16 BRPM | DIASTOLIC BLOOD PRESSURE: 75 MMHG | BODY MASS INDEX: 32 KG/M2 | OXYGEN SATURATION: 95 % | WEIGHT: 158.73 LBS

## 2022-10-03 DIAGNOSIS — R79.89 ELEVATED LIVER FUNCTION TESTS: ICD-10-CM

## 2022-10-03 DIAGNOSIS — R10.2 SUPRAPUBIC ABDOMINAL PAIN: ICD-10-CM

## 2022-10-03 DIAGNOSIS — T14.8XXA SKIN WOUND FROM SURGICAL INCISION: Primary | ICD-10-CM

## 2022-10-03 LAB
A/G RATIO: 1.2 (ref 1.1–2.2)
ALBUMIN SERPL-MCNC: 3.7 G/DL (ref 3.4–5)
ALP BLD-CCNC: 160 U/L (ref 40–129)
ALT SERPL-CCNC: 114 U/L (ref 10–40)
ANION GAP SERPL CALCULATED.3IONS-SCNC: 12 MMOL/L (ref 3–16)
AST SERPL-CCNC: 98 U/L (ref 15–37)
BASOPHILS ABSOLUTE: 0.1 K/UL (ref 0–0.2)
BASOPHILS RELATIVE PERCENT: 1 %
BILIRUB SERPL-MCNC: 0.3 MG/DL (ref 0–1)
BUN BLDV-MCNC: 16 MG/DL (ref 7–20)
CALCIUM SERPL-MCNC: 9.2 MG/DL (ref 8.3–10.6)
CHLORIDE BLD-SCNC: 102 MMOL/L (ref 99–110)
CO2: 26 MMOL/L (ref 21–32)
CREAT SERPL-MCNC: 0.8 MG/DL (ref 0.6–1.1)
EOSINOPHILS ABSOLUTE: 0.2 K/UL (ref 0–0.6)
EOSINOPHILS RELATIVE PERCENT: 1.7 %
GFR AFRICAN AMERICAN: >60
GFR NON-AFRICAN AMERICAN: >60
GLUCOSE BLD-MCNC: 104 MG/DL (ref 70–99)
HCT VFR BLD CALC: 41.1 % (ref 36–48)
HEMOGLOBIN: 13.7 G/DL (ref 12–16)
LYMPHOCYTES ABSOLUTE: 3.2 K/UL (ref 1–5.1)
LYMPHOCYTES RELATIVE PERCENT: 27.7 %
MCH RBC QN AUTO: 28.1 PG (ref 26–34)
MCHC RBC AUTO-ENTMCNC: 33.2 G/DL (ref 31–36)
MCV RBC AUTO: 84.6 FL (ref 80–100)
MONOCYTES ABSOLUTE: 0.9 K/UL (ref 0–1.3)
MONOCYTES RELATIVE PERCENT: 7.4 %
NEUTROPHILS ABSOLUTE: 7.2 K/UL (ref 1.7–7.7)
NEUTROPHILS RELATIVE PERCENT: 62.2 %
PDW BLD-RTO: 15.8 % (ref 12.4–15.4)
PLATELET # BLD: 285 K/UL (ref 135–450)
PMV BLD AUTO: 7.8 FL (ref 5–10.5)
POTASSIUM REFLEX MAGNESIUM: 3.9 MMOL/L (ref 3.5–5.1)
RBC # BLD: 4.86 M/UL (ref 4–5.2)
SODIUM BLD-SCNC: 140 MMOL/L (ref 136–145)
TOTAL PROTEIN: 6.8 G/DL (ref 6.4–8.2)
WBC # BLD: 11.6 K/UL (ref 4–11)

## 2022-10-03 PROCEDURE — 6360000002 HC RX W HCPCS: Performed by: EMERGENCY MEDICINE

## 2022-10-03 PROCEDURE — 96365 THER/PROPH/DIAG IV INF INIT: CPT

## 2022-10-03 PROCEDURE — 96361 HYDRATE IV INFUSION ADD-ON: CPT

## 2022-10-03 PROCEDURE — 74177 CT ABD & PELVIS W/CONTRAST: CPT

## 2022-10-03 PROCEDURE — 6360000004 HC RX CONTRAST MEDICATION: Performed by: EMERGENCY MEDICINE

## 2022-10-03 PROCEDURE — 96375 TX/PRO/DX INJ NEW DRUG ADDON: CPT

## 2022-10-03 PROCEDURE — 36415 COLL VENOUS BLD VENIPUNCTURE: CPT

## 2022-10-03 PROCEDURE — 85025 COMPLETE CBC W/AUTO DIFF WBC: CPT

## 2022-10-03 PROCEDURE — 96366 THER/PROPH/DIAG IV INF ADDON: CPT

## 2022-10-03 PROCEDURE — 2580000003 HC RX 258: Performed by: EMERGENCY MEDICINE

## 2022-10-03 PROCEDURE — 99285 EMERGENCY DEPT VISIT HI MDM: CPT

## 2022-10-03 PROCEDURE — 80053 COMPREHEN METABOLIC PANEL: CPT

## 2022-10-03 RX ORDER — 0.9 % SODIUM CHLORIDE 0.9 %
1000 INTRAVENOUS SOLUTION INTRAVENOUS ONCE
Status: COMPLETED | OUTPATIENT
Start: 2022-10-03 | End: 2022-10-03

## 2022-10-03 RX ADMIN — SODIUM CHLORIDE 1000 ML: 9 INJECTION, SOLUTION INTRAVENOUS at 17:13

## 2022-10-03 RX ADMIN — HYDROMORPHONE HYDROCHLORIDE 1 MG: 1 INJECTION, SOLUTION INTRAMUSCULAR; INTRAVENOUS; SUBCUTANEOUS at 18:12

## 2022-10-03 RX ADMIN — IOPAMIDOL 75 ML: 755 INJECTION, SOLUTION INTRAVENOUS at 16:42

## 2022-10-03 RX ADMIN — Medication 25 MG: at 18:08

## 2022-10-03 ASSESSMENT — PAIN DESCRIPTION - ORIENTATION
ORIENTATION: LEFT;RIGHT;MID;LOWER
ORIENTATION: RIGHT;LEFT;MID
ORIENTATION: RIGHT;LEFT;MID

## 2022-10-03 ASSESSMENT — PAIN - FUNCTIONAL ASSESSMENT
PAIN_FUNCTIONAL_ASSESSMENT: 0-10
PAIN_FUNCTIONAL_ASSESSMENT: PREVENTS OR INTERFERES SOME ACTIVE ACTIVITIES AND ADLS
PAIN_FUNCTIONAL_ASSESSMENT: ACTIVITIES ARE NOT PREVENTED
PAIN_FUNCTIONAL_ASSESSMENT: 0-10

## 2022-10-03 ASSESSMENT — PAIN DESCRIPTION - PAIN TYPE
TYPE: ACUTE PAIN

## 2022-10-03 ASSESSMENT — PAIN DESCRIPTION - DESCRIPTORS
DESCRIPTORS: SHARP;STABBING
DESCRIPTORS: DISCOMFORT
DESCRIPTORS: SHARP;DULL
DESCRIPTORS: SHARP;DULL

## 2022-10-03 ASSESSMENT — PAIN SCALES - GENERAL
PAINLEVEL_OUTOF10: 5
PAINLEVEL_OUTOF10: 8
PAINLEVEL_OUTOF10: 5
PAINLEVEL_OUTOF10: 8

## 2022-10-03 ASSESSMENT — PAIN DESCRIPTION - LOCATION
LOCATION: ABDOMEN

## 2022-10-03 ASSESSMENT — PAIN DESCRIPTION - FREQUENCY
FREQUENCY: CONTINUOUS
FREQUENCY: CONTINUOUS

## 2022-10-03 ASSESSMENT — PAIN DESCRIPTION - ONSET
ONSET: ON-GOING
ONSET: ON-GOING

## 2022-10-03 NOTE — Clinical Note
Lynn Leone was seen and treated in our emergency department on 10/3/2022. She may return to work on 10/10/2022. Restrictions as directed by the general surgeon. If you have any questions or concerns, please don't hesitate to call.       Estefany aMrch, DO

## 2022-10-03 NOTE — DISCHARGE INSTRUCTIONS
Continue your Phenergan and Zofran at home as previously prescribed. Continue pain medicines as previously prescribed. Please continue your present treatments and medications until you receive further instructions from your healthcare professional. Take all your medications as prescribed unless a healthcare professional instructs you to do otherwise. Follow-up with your surgeon for further instructions on your dressing changes.

## 2022-10-03 NOTE — CARE COORDINATION
Date/Time:  10/3/2022 10:33 AM  Attempted to reach patient by telephone. Call within 2 business days of discharge: Yes Left HIPPA compliant message requesting a return call. Will attempt to reach patient again.      Tarun Lei RN BSN  Care Transition Nurse  [phone]

## 2022-10-03 NOTE — ED PROVIDER NOTES
[address] [facility]      Pt Name: Ashlee Trinidad  MRN: [id number]  [place] 1980  Date of evaluation: 10/3/2022  Provider: Pleasant Bumpers, DO    CHIEF COMPLAINT  Chief Complaint   Patient presents with    Wound Check     Patient reports hernia repair last week. Reports wound opened. Patient reports fever at home. I wore personal protective equipment when I was in the room the entire time. This includes gloves, N95 mask, face shield, and a glove over my stethoscope for protection. HPI  Ashlee Trinidad is a 38 y.o. female who presents with her suprapubic incision opening up. She had surgery on 9/14/2022 Dr. Vivek Reed for incisional hernia. It became infected and was readmitted to the hospital on 9/25/2022. She was placed on IV antibiotics that time. However, she cannot take oral antibiotics because she vomits them up. She was not placed on any further antibiotics. She noticed the past 24 hours that the wound was starting to open up. Also the infraumbilical incision is starting to open up. She had surgery by Dr. Vivek Reed on 9/14/2022. She had fever of 103 today. She denies any dysuria nocturia hematuria. She denies any back pain. She denies any other complaints. REVIEW OF SYSTEMS  All systems negative except as noted in the HPI. Reviewed Nurses' notes and concur. Patient's last menstrual period was 10/31/2016 (exact date). PAST MEDICAL HISTORY  Past Medical History:   Diagnosis Date    ADHD (attention deficit hyperactivity disorder) 1988    Depression with anxiety     Diabetes mellitus ([facility] [address]     pre-diabetes    Difficult intubation     airway swelled up    Encounter for imaging to screen for metal prior to MRI 06/01/2021    MRI Conditional Medtronic Non-Programmable shunt model#[id number] implanted 10/30/2020 at [facility]. Normal Mode. 1.5T or 3.0T.     ESBL (extended spectrum beta-lactamase) producing bacteria infection 11/06/2019    urine    Functional ovarian cysts 2008    rt ovary cyst x 2 yrs. Headache(784.0)     migraines    History of blood transfusion     at birth    History of kidney stones     History of PCOS     had hysterectomy in 2016    Hydrocephalus ([address]     Hyperlipidemia     Irritable bowel syndrome 2004    had colonoscopy about 6 yrs ago    Meningitis 30/6535    Neutrophilic leukocytosis     Nicotine dependence     PONV (postoperative nausea and vomiting)     very nauseated and sometimes wakes up with a Migraine--happened once after brain surgery    Primary osteoarthritis of left knee 07/01/2016    S/P cone biopsy of cervix 2004    Scoliosis 1990.s    Seizures ([address]     twice--last one in June2022     (ventriculoperitoneal) shunt status 1982    hydrocephalus f/w Neurosurgeon at [facility]    Wears glasses     reading       FAMILY HISTORY  Family History   Problem Relation Age of Onset    High Blood Pressure Mother     Diabetes Mother     High Cholesterol Mother     Depression Mother     Diabetes Maternal Grandmother     High Blood Pressure Maternal Grandmother     High Cholesterol Maternal Grandmother     Heart Disease Maternal Grandfather     High Blood Pressure Maternal Grandfather     Heart Disease Paternal Grandmother     Stroke Paternal Grandfather     Depression Sister     Cirrhosis Father     Rheum Arthritis Neg Hx     Osteoarthritis Neg Hx     Asthma Neg Hx     Breast Cancer Neg Hx     Cancer Neg Hx     Heart Failure Neg Hx     Hypertension Neg Hx     Migraines Neg Hx     Ovarian Cancer Neg Hx     Rashes/Skin Problems Neg Hx     Seizures Neg Hx     Thyroid Disease Neg Hx        SOCIAL HISTORY   reports that she has been smoking cigarettes. She has a 9.00 pack-year smoking history. She has never used smokeless tobacco. She reports that she does not drink alcohol and does not use drugs.     SURGICAL HISTORY  Past Surgical History:   Procedure Laterality Date    ABDOMEN SURGERY N/A 7/14/2021    REMOVAL OF ABDOMINAL WALL MASS performed by Jaime Marino MD at [facility]      appendicitis     SECTION      placenta previa    CHOLECYSTECTOMY      COLONOSCOPY  2004    COLPOSCOPY      CSF SHUNT      replaced at age 17    CYSTOSCOPY      stone removal    HEMORRHOID SURGERY      HERNIA REPAIR Bilateral     inguinal    HERNIA REPAIR      hiatal hernia    HYSTERECTOMY, TOTAL ABDOMINAL (CERVIX REMOVED)  2016    TAHBSO, adhesions/PCOS    KNEE ARTHROSCOPY Left 2015    medial meniscectomy, chondroplasty, plica resection    LITHOTRIPSY Bilateral 12/10/2019    OTHER SURGICAL HISTORY  10/19/2016    op lap    VENTRAL HERNIA REPAIR N/A 2022    LAPAROSCOPIC LYSIS OF ADHESIONS AND ABDOMINAL WALL MASS REMOVAL performed by Jaime Marino MD at [place] [address]      multiple revisions, most recent        CURRENT MEDICATIONS  Current Outpatient Rx   Medication Sig Dispense Refill    oxyCODONE (ROXICODONE) 5 MG immediate release tablet Take 1 tablet by mouth every 6 hours as needed for Pain for up to 5 days. 20 tablet 0    promethazine (PROMETHEGAN) 25 MG suppository Place 1 suppository rectally every 6 hours as needed for Nausea 20 suppository 0    lidocaine 4 % external patch Place 1 patch onto the skin daily 1 each 0    brompheniramine-pseudoephedrine-DM 2-30-10 MG/5ML syrup Take 5 mLs by mouth 4 times daily as needed for Cough or Congestion 118 mL 0    albuterol sulfate HFA (VENTOLIN HFA) 108 (90 Base) MCG/ACT inhaler Inhale 2 puffs into the lungs 4 times daily as needed for Wheezing 54 g 1    hydrOXYzine HCl (ATARAX) 25 MG tablet Take 1 tablet by mouth every 8 hours as needed for Anxiety 30 tablet 0    VYVANSE 50 MG capsule Take 1 capsule by mouth every morning for 30 days. Take 50 mg by mouth every morning.  30 capsule 0    simvastatin (ZOCOR) 10 MG tablet Take 10 mg by mouth daily      gabapentin (NEURONTIN) 400 MG capsule Take 400 mg by mouth 3 times daily. ALLERGIES  Allergies   Allergen Reactions    Bee Venom Shortness Of Breath and Swelling    Bentyl [Dicyclomine Hcl] Shortness Of Breath and Anxiety    Dicyclomine Anxiety and Shortness Of Breath    Ketorolac Tromethamine Hives and Itching     Injectable only  Tolerates PO NSAIDs fine    Levofloxacin Anxiety and Hives    Maitake Anaphylaxis    Shiitake Mushroom Anaphylaxis     Can not eat mushrooms    Sulfa Antibiotics Shortness Of Breath    Vancomycin Anaphylaxis and Hives    Zosyn [Piperacillin Sod-Tazobactam So] Hives, Shortness Of Breath, Nausea Only and Dizziness or Vertigo    Adhesive Tape Dermatitis     Other reaction(s): Dermatitis    Oxycodone-Acetaminophen Nausea And Vomiting     Tolerates Norco/Vicodin ok  Patient can not tolerate Percocet well. Extreme vomiting      Sulfacetamide Hives    Acetaminophen Anxiety     Patient reports when taking acetaminophen (including Norco/Percocet) she gets severe anxiety when taking this medication. Butalbital-Apap-Caff-Cod Anxiety    Ceftaroline Rash    Daptomycin Swelling and Rash    Fosfomycin Tromethamine Nausea And Vomiting    Haldol [Haloperidol] Anxiety    Ketamine Nausea And Vomiting and Anxiety    Methocarbamol Rash    Morphine Hives    Nitrofurantoin Nausea And Vomiting     \"projectile vomiting\"    Prochlorperazine Anxiety    Reglan [Metoclopramide] Anxiety    Silicone Hives, Swelling and Rash    Tazobactam Nausea And Vomiting and Anxiety       Tetanus vaccination status reviewed: tetanus re-vaccination not indicated. PHYSICAL EXAM  VITAL SIGNS: BP (!) 156/101   Pulse 92   Temp 97.9 °F (36.6 °C) (Temporal)   Resp 16   LMP 10/31/2016 (Exact Date)   SpO2 100%   Constitutional: Well-developed, well-nourished, appears normal, nontoxic, activity: Resting comfortably on the cart, no obvious pain, speaking full sentences, her temperature is normal at 97.9 today.   Abdomen: Soft, moderate suprapubic tender, there is a healed incision noted in the suprapubic area. There is opening of 3 cm in the right central incision, no discharge or erythema is noted, subcutaneous stitches are observed, no hepatosplenomegaly, No masses, No pulsatile masses, No distension, normal bowel sounds  Skin: Warm, Dry, No erythema, No rash. Extremities: no major deformities noted. Neurologic: Alert & oriented x 3  Psychiatric: Affect normal, Mood normal.    ?  LABORATORY  Labs Reviewed   CBC WITH AUTO DIFFERENTIAL - Abnormal; Notable for the following components:       Result Value    WBC 11.6 (*)     RDW 15.8 (*)     All other components within normal limits   COMPREHENSIVE METABOLIC PANEL W/ REFLEX TO MG FOR LOW K - Abnormal; Notable for the following components:    Glucose 104 (*)     Alkaline Phosphatase 160 (*)      (*)     AST 98 (*)     All other components within normal limits   URINALYSIS WITH REFLEX TO CULTURE       RADIOLOGY/PROCEDURES  I personally reviewed the images for this case. CT ABDOMEN PELVIS W IV CONTRAST Additional Contrast? None   Final Result   1. No evidence of postoperative complications or anterior abdominal wall   abscess. 2. Stable shunt catheter with the tip in the anterior pelvis. 3. Mild diverticulosis but no acute diverticulitis. COURSE & MEDICAL DECISION MAKING  Pertinent Labs & Imaging studies reviewed.  (See chart for details)    Vitals:    10/03/22 1449   BP: (!) 156/101   Pulse: 92   Resp: 16   Temp: 97.9 °F (36.6 °C)   TempSrc: Temporal   SpO2: 100%       Medications   0.9 % sodium chloride bolus (1,000 mLs IntraVENous New Bag 10/3/22 1713)   HYDROmorphone (DILAUDID) injection 1 mg (has no administration in time range)   promethazine (PHENERGAN) in sodium chloride 0.9% 50 mL IVPB 25 mg (has no administration in time range)   iopamidol (ISOVUE-370) 76 % injection 75 mL (75 mLs IntraVENous Given 10/3/22 1642)       New Prescriptions    No medications on file       SEP-1 CORE MEASURE DATA  Exclusion criteria: the patient is NOT to be included for sepsis due to:  SIRS criteria are not met    Patient remained stable in the ED. patient's laboratory work was returned with a mildly elevated white count at 11.6. Her liver functions were mildly elevated. Her abdominal exam did not reveal any tenderness in the bilateral upper quadrants. It was noted that there was a wound separation in the suprapubic area measuring 3 cm in length and 1 cm deep with subcutaneous stitches visible. CT scan did not show any abscess or infection. Her infraumbilical incision appears to be irritated but not infected. Therefore, I spoke to Dr. Candido Jimenez the general surgeon covering for Dr. Yamel Mora, and he stated the patient could be sent home with a wet-to-dry dressing placed over the wound and follow-up with Dr. Yamel Mora tomorrow. Patient was given pain medicine and Phenergan in the emergency department. She was instructed to continue her medications at home. She was instructed return if any problems. The patient's blood pressure was found to be elevated according to CMS/Medicare and the Affordable Care Act/ObamaCare criteria. Elevated blood pressure could occur because of pain or anxiety or other reasons and does not mean that they need to have their blood pressure treated or medications otherwise adjusted. However, this could also be a sign that they will need to have their blood pressure treated or medications changed. The patient was instructed to follow up closely with their personal physician to have their blood pressure rechecked. The patient was instructed to take a list of recent blood pressure readings to their next visit with their personal physician. See discharge instructions for specific medications, discharge information, and treatments. They were verbally instructed to return to emergency if any problems. (This chart has been completed using [address].  Although attempts have been made to ensure accuracy, words and/or phrases may not be transcribed as intended.)    Patient refused pain medicines at the time of their exam.    IMPRESSION(S):  1. Skin wound from surgical incision        ? Recheck Times: 1800    Diagnostic considerations include but are not limited to:   Abdominal Aortic Aneurysm, Acute Coronary Syndrome, Ischemic Bowel, Bowel Obstruction (including Gastric Outlet Obstruction), cyclical vomiting syndrome, PUD, GERD, Acute Cholecystitis, Pancreatitis, Hepatitis, Colitis, SMA Syndrome, Mesenteric Steal Syndrome, Splanchnic Vein Thrombosis, cyclical vomiting syndrome, other         Louis Paige DO  10/03/22 4487

## 2022-10-03 NOTE — Clinical Note
Merlene Pablo was seen and treated in our emergency department on 10/3/2022. She may return to work on 10/10/2022. Restrictions as directed by the general surgeon. If you have any questions or concerns, please don't hesitate to call.       Xochilt Felder, DO

## 2022-10-04 ENCOUNTER — CARE COORDINATION (OUTPATIENT)
Dept: CASE MANAGEMENT | Age: 42
End: 2022-10-04

## 2022-10-04 ENCOUNTER — HOSPITAL ENCOUNTER (EMERGENCY)
Age: 42
Discharge: HOME | End: 2022-10-04

## 2022-10-04 ENCOUNTER — TELEPHONE (OUTPATIENT)
Dept: SURGERY | Age: 42
End: 2022-10-04

## 2022-10-04 NOTE — CARE COORDINATION
Upon chart review it is noted that Petrona is at the ED. Call placed to [address] [facility] office to speak with Shandra Born. Staff states she is not there. Staff state there is no number to reach her at. Message left. [employer] teams message sent.     Lo Bolanos RN BSN  Care Transition Nurse  [phone]

## 2022-10-04 NOTE — TELEPHONE ENCOUNTER
Message was sent to Nuno Day, covering manager for [place]. Rafia Salgado from [facility] would like a call regarding the patient and her care. Dr. Karla Urbina was also informed if we need to send patient back to ED or not.

## 2022-10-04 NOTE — CARE COORDINATION
[facility] Care Transitions Initial Follow Up Call    Call within 2 business days of discharge: Yes    Care Transition Nurse contacted the patient by telephone to perform post hospital discharge assessment. Verified name and  with patient as identifiers. Provided introduction to self, and explanation of the Care Transition Nurse role. Patient: Ash Giang Patient : 1980   MRN: [id number]  Reason for Admission: sepsis  Discharge Date: 10/3/22 RARS: Readmission Risk Score: 17.7      Last Discharge  [address]       Date Complaint Diagnosis Description Type Department Provider    10/3/22 Wound Check Skin wound from surgical incision . .. ED (DISCHARGE) [facility] ED Hever Thao, DO            Was this an external facility discharge? No Discharge Facility: [facility] to be reviewed by the provider   Additional needs identified to be addressed with provider: Yes                 Method of communication with provider: phone. 2nd attempt at CT discharge phone call and recent ED visit follow up. Petrona states she is \"not good\". She states her temp = 102.9. She states she is unable to keep food - fluids or pain medication down. Petrona states she is vomiting and her anti - emetics are not working. She states she is having difficulty keeping the wet to dry dressing in place. She states she is waiting to hear back from the office of . She states she does not drive so will need to get a ride. She states she was informed that  is no longer in the office - that he is in surgery. Writer placed call to the office. Informed staff of Petrona's condition. Informed them of the instructions from the ED staff which came from [address] [place] [address] regarding Petrona needing to be seen today. The office staff states  was in the office in [place] this morning but is now in surgery at [facility]. The will pass on the information to see if Petrona can be seen by another provider in the office.  Writer provided my contact info to the staff. Writer placed 2nd call to Petrona. Informed her of the situation. She states if she begins to feel worse she will call EMS and return to the hospital. CT Team will follow. Follow Up  No future appointments.        Jeovany Rdz RN BSN  Care Transition Nurse  [phone]

## 2022-10-04 NOTE — TELEPHONE ENCOUNTER
Per [facility] call in zofran if she isn't allergic. Anai Sims Pt states she already has zofran. Appointment made for Thursday.

## 2022-10-04 NOTE — TELEPHONE ENCOUNTER
Jp Mittal, covering  for Magda Fountain. Reached out to Yris Schofield. The message said to contact Tariq Wren. She called Tariq Wren and then it connected her to the care coordinator. Jp Mittal left a message for a return call to discus patient.

## 2022-10-05 ENCOUNTER — CARE COORDINATION (OUTPATIENT)
Dept: CASE MANAGEMENT | Age: 42
End: 2022-10-05

## 2022-10-05 NOTE — CARE COORDINATION
[facility] Care Transitions Initial Follow Up Call    Call within 2 business days of discharge: Yes    Care Transition Nurse contacted the patient by telephone to perform post hospital discharge assessment. Verified name and  with patient as identifiers. Provided introduction to self, and explanation of the Care Transition Nurse role. Patient: Luisa Jefferson Patient : 1980   MRN: [id number]  Reason for Admission: sepsis  Discharge Date: 10/4/22 RARS: Readmission Risk Score: 17.7      Last Discharge  [facility]       Date Complaint Diagnosis Description Type Department Provider    10/4/22 Nausea vomiting and fever  ED (LWBS BEFORE) [facility] ED                 Spoke with Petrona. She states she did return to the ED at [facility]. She states due to many issues she had to leave. She was taken to [address] [place] [address] where she was given fluids & IV antiemetics. Her wound was packed. She was discharged to home. Petrona states her temp this morning = 102.2. She has an appointment tomorrow with . CT team will follow.        Care Transitions 24 Hour Call    Care Transitions Interventions         Follow Up  Future Appointments   Date Time Provider Claudia Mac   10/6/2022 11:30 AM Irena Steiner MD [id number] TAN Christensen RN BSN  Care Transition Nurse  [phone]

## 2022-10-06 ENCOUNTER — HOSPITAL ENCOUNTER (EMERGENCY)
Age: 42
Discharge: HOME | DRG: 721 | End: 2022-10-06
Attending: EMERGENCY MEDICINE
Payer: COMMERCIAL

## 2022-10-06 ENCOUNTER — APPOINTMENT (OUTPATIENT)
Dept: GENERAL RADIOLOGY | Age: 42
DRG: 721 | End: 2022-10-06
Payer: COMMERCIAL

## 2022-10-06 VITALS
WEIGHT: 167.55 LBS | HEART RATE: 100 BPM | TEMPERATURE: 98.8 F | OXYGEN SATURATION: 95 % | SYSTOLIC BLOOD PRESSURE: 133 MMHG | DIASTOLIC BLOOD PRESSURE: 91 MMHG | BODY MASS INDEX: 33.78 KG/M2 | HEIGHT: 59 IN | RESPIRATION RATE: 16 BRPM

## 2022-10-06 DIAGNOSIS — Z51.89 ENCOUNTER FOR WOUND CARE: Primary | ICD-10-CM

## 2022-10-06 LAB
ANION GAP SERPL CALCULATED.3IONS-SCNC: 17 MMOL/L (ref 3–16)
BASOPHILS ABSOLUTE: 0.1 K/UL (ref 0–0.2)
BASOPHILS RELATIVE PERCENT: 0.8 %
BUN BLDV-MCNC: 8 MG/DL (ref 7–20)
CALCIUM SERPL-MCNC: 9.7 MG/DL (ref 8.3–10.6)
CHLORIDE BLD-SCNC: 102 MMOL/L (ref 99–110)
CO2: 22 MMOL/L (ref 21–32)
CREAT SERPL-MCNC: 0.7 MG/DL (ref 0.6–1.1)
EOSINOPHILS ABSOLUTE: 0.3 K/UL (ref 0–0.6)
EOSINOPHILS RELATIVE PERCENT: 2.4 %
GFR AFRICAN AMERICAN: >60
GFR NON-AFRICAN AMERICAN: >60
GLUCOSE BLD-MCNC: 197 MG/DL (ref 70–99)
HCG QUALITATIVE: NEGATIVE
HCT VFR BLD CALC: 41.2 % (ref 36–48)
HEMOGLOBIN: 13.8 G/DL (ref 12–16)
LYMPHOCYTES ABSOLUTE: 3.7 K/UL (ref 1–5.1)
LYMPHOCYTES RELATIVE PERCENT: 28.1 %
MAGNESIUM: 2 MG/DL (ref 1.8–2.4)
MCH RBC QN AUTO: 28.5 PG (ref 26–34)
MCHC RBC AUTO-ENTMCNC: 33.5 G/DL (ref 31–36)
MCV RBC AUTO: 85 FL (ref 80–100)
MONOCYTES ABSOLUTE: 0.8 K/UL (ref 0–1.3)
MONOCYTES RELATIVE PERCENT: 6 %
NEUTROPHILS ABSOLUTE: 8.2 K/UL (ref 1.7–7.7)
NEUTROPHILS RELATIVE PERCENT: 62.7 %
PDW BLD-RTO: 16.3 % (ref 12.4–15.4)
PLATELET # BLD: 279 K/UL (ref 135–450)
PMV BLD AUTO: 7.2 FL (ref 5–10.5)
POTASSIUM REFLEX MAGNESIUM: 3.1 MMOL/L (ref 3.5–5.1)
RBC # BLD: 4.84 M/UL (ref 4–5.2)
SODIUM BLD-SCNC: 141 MMOL/L (ref 136–145)
WBC # BLD: 13.1 K/UL (ref 4–11)

## 2022-10-06 PROCEDURE — 6360000002 HC RX W HCPCS: Performed by: EMERGENCY MEDICINE

## 2022-10-06 PROCEDURE — 83735 ASSAY OF MAGNESIUM: CPT

## 2022-10-06 PROCEDURE — 99284 EMERGENCY DEPT VISIT MOD MDM: CPT

## 2022-10-06 PROCEDURE — 96361 HYDRATE IV INFUSION ADD-ON: CPT

## 2022-10-06 PROCEDURE — 96375 TX/PRO/DX INJ NEW DRUG ADDON: CPT

## 2022-10-06 PROCEDURE — 96376 TX/PRO/DX INJ SAME DRUG ADON: CPT

## 2022-10-06 PROCEDURE — 96374 THER/PROPH/DIAG INJ IV PUSH: CPT

## 2022-10-06 PROCEDURE — 71045 X-RAY EXAM CHEST 1 VIEW: CPT

## 2022-10-06 PROCEDURE — 80048 BASIC METABOLIC PNL TOTAL CA: CPT

## 2022-10-06 PROCEDURE — 6370000000 HC RX 637 (ALT 250 FOR IP): Performed by: EMERGENCY MEDICINE

## 2022-10-06 PROCEDURE — 84703 CHORIONIC GONADOTROPIN ASSAY: CPT

## 2022-10-06 PROCEDURE — 2580000003 HC RX 258: Performed by: EMERGENCY MEDICINE

## 2022-10-06 PROCEDURE — 85025 COMPLETE CBC W/AUTO DIFF WBC: CPT

## 2022-10-06 RX ORDER — ONDANSETRON 2 MG/ML
4 INJECTION INTRAMUSCULAR; INTRAVENOUS ONCE
Status: COMPLETED | OUTPATIENT
Start: 2022-10-06 | End: 2022-10-06

## 2022-10-06 RX ORDER — POTASSIUM CHLORIDE 750 MG/1
40 TABLET, FILM COATED, EXTENDED RELEASE ORAL ONCE
Status: COMPLETED | OUTPATIENT
Start: 2022-10-06 | End: 2022-10-06

## 2022-10-06 RX ORDER — MORPHINE SULFATE 4 MG/ML
4 INJECTION, SOLUTION INTRAMUSCULAR; INTRAVENOUS ONCE
Status: COMPLETED | OUTPATIENT
Start: 2022-10-06 | End: 2022-10-06

## 2022-10-06 RX ORDER — 0.9 % SODIUM CHLORIDE 0.9 %
1000 INTRAVENOUS SOLUTION INTRAVENOUS ONCE
Status: COMPLETED | OUTPATIENT
Start: 2022-10-06 | End: 2022-10-06

## 2022-10-06 RX ADMIN — POTASSIUM CHLORIDE 40 MEQ: 750 TABLET, FILM COATED, EXTENDED RELEASE ORAL at 05:29

## 2022-10-06 RX ADMIN — MORPHINE SULFATE 4 MG: 4 INJECTION, SOLUTION INTRAMUSCULAR; INTRAVENOUS at 04:54

## 2022-10-06 RX ADMIN — SODIUM CHLORIDE 1000 ML: 9 INJECTION, SOLUTION INTRAVENOUS at 04:55

## 2022-10-06 RX ADMIN — ONDANSETRON 4 MG: 2 INJECTION INTRAMUSCULAR; INTRAVENOUS at 05:30

## 2022-10-06 RX ADMIN — ONDANSETRON 4 MG: 2 INJECTION INTRAMUSCULAR; INTRAVENOUS at 04:54

## 2022-10-06 ASSESSMENT — PAIN DESCRIPTION - PAIN TYPE: TYPE: ACUTE PAIN

## 2022-10-06 ASSESSMENT — PAIN DESCRIPTION - DESCRIPTORS
DESCRIPTORS: SHARP
DESCRIPTORS: SHARP

## 2022-10-06 ASSESSMENT — ENCOUNTER SYMPTOMS
CONSTIPATION: 0
ABDOMINAL PAIN: 0
SHORTNESS OF BREATH: 0
EYE DISCHARGE: 0
EYE ITCHING: 0
COLOR CHANGE: 0
VOMITING: 0
COUGH: 0

## 2022-10-06 ASSESSMENT — PAIN - FUNCTIONAL ASSESSMENT: PAIN_FUNCTIONAL_ASSESSMENT: 0-10

## 2022-10-06 ASSESSMENT — PAIN DESCRIPTION - ORIENTATION
ORIENTATION: LOWER
ORIENTATION: LOWER;MID

## 2022-10-06 ASSESSMENT — LIFESTYLE VARIABLES: HOW OFTEN DO YOU HAVE A DRINK CONTAINING ALCOHOL: NEVER

## 2022-10-06 ASSESSMENT — PAIN DESCRIPTION - LOCATION
LOCATION: ABDOMEN
LOCATION: ABDOMEN

## 2022-10-06 ASSESSMENT — PAIN DESCRIPTION - FREQUENCY: FREQUENCY: CONTINUOUS

## 2022-10-06 ASSESSMENT — PAIN SCALES - GENERAL: PAINLEVEL_OUTOF10: 8

## 2022-10-06 NOTE — ED PROVIDER NOTES
I PERSONALLY SAW THE PATIENT AND PERFORMED A SUBSTANTIVE PORTION OF THE VISIT INCLUDING ALL ASPECTS OF THE MEDICAL DECISION MAKING PROCESS. [address] [facility] [place]      Pt Name: Madi Lilly  MRN: [id number]  [place] 1980  Date of evaluation: 10/6/2022  Provider: Saleem Cortez MD    CHIEF COMPLAINT       Chief Complaint   Patient presents with    Abdominal Pain          HISTORY OF PRESENT ILLNESS    Madi Lilly is a 38 y.o. female who presents to the emergency department with wound evaluation. Patient presents with wound evaluation needed. Had recent surgery on her abdomen. Had wound dehiscence recently. Originally told me it was today where it dehisced and then per chart review she has been evaluated in the emergency room a couple times the last 2 days for this wound dehiscence. She was not forthcoming about this originally and then finally was. She gives multiple different reports and stories. Requesting pain medicine. Denies any new symptoms since her recent ER visit at [facility]. Endorses 10 out of 10 pain at the wound dehiscence site. Described as sharp. Worse with movement. Better with rest.  No fevers or chills. No other associated symptoms. Nursing Notes were reviewed. Including nursing noted for FM, Surgical History, Past Medical History, Social History, vitals, and allergies; agree with all. REVIEW OF SYSTEMS       Review of Systems   Constitutional:  Negative for diaphoresis and unexpected weight change. HENT:  Negative for congestion and dental problem. Eyes:  Negative for discharge and itching. Respiratory:  Negative for cough and shortness of breath. Cardiovascular:  Negative for chest pain and leg swelling. Gastrointestinal:  Negative for abdominal pain, constipation and vomiting. Endocrine: Negative for cold intolerance and heat intolerance.    Genitourinary:  Negative for vaginal bleeding, vaginal discharge and vaginal pain. Musculoskeletal:  Negative for neck pain and neck stiffness. Skin:  Positive for wound. Negative for color change and pallor. Neurological:  Negative for tremors and weakness. Psychiatric/Behavioral:  Negative for agitation and behavioral problems. Except as noted above the remainder of the review of systems was reviewed and negative. PAST MEDICAL HISTORY     Past Medical History:   Diagnosis Date    ADHD (attention deficit hyperactivity disorder) 1988    Depression with anxiety     Diabetes mellitus ([address]     pre-diabetes    Difficult intubation     airway swelled up    Encounter for imaging to screen for metal prior to MRI 06/01/2021    MRI Conditional [employer] Non-Programmable shunt model#[id number] implanted 10/30/2020 at [facility]. Normal Mode. 1.5T or 3.0T. ESBL (extended spectrum beta-lactamase) producing bacteria infection 11/06/2019    urine    Functional ovarian cysts 2008    rt ovary cyst x 2 yrs.     Headache(784.0)     migraines    History of blood transfusion     at birth    History of kidney stones     History of PCOS     had hysterectomy in 2016    Hydrocephalus [facility])     Hyperlipidemia     Irritable bowel syndrome 2004    had colonoscopy about 6 yrs ago    Meningitis 46/8069    Neutrophilic leukocytosis     Nicotine dependence     PONV (postoperative nausea and vomiting)     very nauseated and sometimes wakes up with a Migraine--happened once after brain surgery    Primary osteoarthritis of left knee 07/01/2016    S/P cone biopsy of cervix 2004    Scoliosis 1990.s    Seizures ([address]     twice--last one in June2022     (ventriculoperitoneal) shunt status 1982    hydrocephalus f/w Neurosurgeon at [facility]    Wears glasses     reading       SURGICAL HISTORY       Past Surgical History:   Procedure Laterality Date    ABDOMEN SURGERY N/A 7/14/2021    REMOVAL OF ABDOMINAL WALL MASS performed by Nafisa Duarte MD at [facility]  2003 appendicitis     SECTION      placenta previa    CHOLECYSTECTOMY      COLONOSCOPY  2004    COLPOSCOPY      CSF SHUNT      replaced at age 17    CYSTOSCOPY      stone removal    HEMORRHOID SURGERY      HERNIA REPAIR Bilateral     inguinal    HERNIA REPAIR      hiatal hernia    HYSTERECTOMY, TOTAL ABDOMINAL (CERVIX REMOVED)  2016    TAHBSO, adhesions/PCOS    KNEE ARTHROSCOPY Left 2015    medial meniscectomy, chondroplasty, plica resection    LITHOTRIPSY Bilateral 12/10/2019    OTHER SURGICAL HISTORY  10/19/2016    op lap    VENTRAL HERNIA REPAIR N/A 2022    LAPAROSCOPIC LYSIS OF ADHESIONS AND ABDOMINAL WALL MASS REMOVAL performed by Nafisa Duarte MD at [place] [address]      multiple revisions, most recent        CURRENT MEDICATIONS       Previous Medications    ALBUTEROL SULFATE HFA (VENTOLIN HFA) 108 (90 BASE) MCG/ACT INHALER    Inhale 2 puffs into the lungs 4 times daily as needed for Wheezing    BROMPHENIRAMINE-PSEUDOEPHEDRINE-DM 2-30-10 MG/5ML SYRUP    Take 5 mLs by mouth 4 times daily as needed for Cough or Congestion    GABAPENTIN (NEURONTIN) 400 MG CAPSULE    Take 400 mg by mouth 3 times daily. LIDOCAINE 4 % EXTERNAL PATCH    Place 1 patch onto the skin daily    PROMETHAZINE (PROMETHEGAN) 25 MG SUPPOSITORY    Place 1 suppository rectally every 6 hours as needed for Nausea    SIMVASTATIN (ZOCOR) 10 MG TABLET    Take 10 mg by mouth daily    VYVANSE 50 MG CAPSULE    Take 1 capsule by mouth every morning for 30 days. Take 50 mg by mouth every morning.        ALLERGIES     Bee venom, Bentyl [dicyclomine hcl], Dicyclomine, Ketorolac tromethamine, Levofloxacin, Maitake, Shiitake mushroom, Sulfa antibiotics, Vancomycin, Zosyn [piperacillin sod-tazobactam so], Adhesive tape, Oxycodone-acetaminophen, Sulfacetamide, Acetaminophen, Butalbital-apap-caff-cod, Ceftaroline, Daptomycin, Fosfomycin tromethamine, Haldol [haloperidol], Ketamine, Methocarbamol, Morphine, Nitrofurantoin, Prochlorperazine, Reglan [metoclopramide], Silicone, and Tazobactam    FAMILY HISTORY        Family History   Problem Relation Age of Onset    High Blood Pressure Mother     Diabetes Mother     High Cholesterol Mother     Depression Mother     Diabetes Maternal Grandmother     High Blood Pressure Maternal Grandmother     High Cholesterol Maternal Grandmother     Heart Disease Maternal Grandfather     High Blood Pressure Maternal Grandfather     Heart Disease Paternal Grandmother     Stroke Paternal Grandfather     Depression Sister     Cirrhosis Father     Rheum Arthritis Neg Hx     Osteoarthritis Neg Hx     Asthma Neg Hx     Breast Cancer Neg Hx     Cancer Neg Hx     Heart Failure Neg Hx     Hypertension Neg Hx     Migraines Neg Hx     Ovarian Cancer Neg Hx     Rashes/Skin Problems Neg Hx     Seizures Neg Hx     Thyroid Disease Neg Hx        SOCIAL HISTORY       Social History     Socioeconomic History    Marital status: Single     Spouse name: None    Number of children: None    Years of education: None    Highest education level: None   Occupational History    Occupation: disability, SSI    Tobacco Use    Smoking status: Every Day     Packs/day: 0.50     Years: 18.00     Pack years: 9.00     Types: Cigarettes    Smokeless tobacco: Never   Vaping Use    Vaping Use: Never used   Substance and Sexual Activity    Alcohol use: No     Alcohol/week: 0.0 standard drinks    Drug use: Never    Sexual activity: Not Currently     Partners: Male       PHYSICAL EXAM       ED Triage Vitals [10/06/22 0443]   BP Temp Temp Source Heart Rate Resp SpO2 Height Weight   (!) 153/101 98.8 °F (37.1 °C) Oral (!) 124 16 99 % 4' 11\" (1.499 m) 167 lb 8.8 oz (76 kg)       Physical Exam  Vitals and nursing note reviewed. Constitutional:       General: She is not in acute distress. Appearance: She is well-developed. She is not ill-appearing, toxic-appearing or diaphoretic.    HENT:      Head: Normocephalic and atraumatic. Right Ear: External ear normal.      Left Ear: External ear normal.   Eyes:      General:         Right eye: No discharge. Left eye: No discharge. Conjunctiva/sclera: Conjunctivae normal.      Pupils: Pupils are equal, round, and reactive to light. Cardiovascular:      Rate and Rhythm: Regular rhythm. Tachycardia present. Heart sounds: No murmur heard. Pulmonary:      Effort: Pulmonary effort is normal. No respiratory distress. Breath sounds: Normal breath sounds. No wheezing or rales. Abdominal:      General: Bowel sounds are normal. There is no distension. Palpations: Abdomen is soft. There is no mass. Tenderness: There is no abdominal tenderness. There is no guarding or rebound. Comments: Soft, nontender abdomen, there is a healed incision noted in the suprapubic area. There is opening/dehiscence 3 cm in the lower abdominal incision, no discharge or erythema is noted   Genitourinary:     Comments: Deferred  Musculoskeletal:         General: No deformity. Normal range of motion. Cervical back: Normal range of motion and neck supple. Skin:     General: Skin is warm. Findings: No erythema or rash. Neurological:      Mental Status: She is alert and oriented to person, place, and time. She is not disoriented. Cranial Nerves: No cranial nerve deficit. Motor: No atrophy or abnormal muscle tone. Coordination: Coordination normal.   Psychiatric:         Behavior: Behavior normal.         Thought Content:  Thought content normal.       DIAGNOSTIC RESULTS     EKG: All EKG's are interpreted by the Emergency Department Physician who either signs or Co-signs this chart in the absence of acardiologist.    None    RADIOLOGY:   Non-plain film images such as CT, Ultrasoundand MRI are read by the radiologist. Plain radiographic images are visualized and preliminarily interpreted by the emergency physician with the below findings:    None    ED BEDSIDE ULTRASOUND:   Performed by ED Physician - none    LABS:  Labs Reviewed   CBC WITH AUTO DIFFERENTIAL - Abnormal; Notable for the following components:       Result Value    WBC 13.1 (*)     RDW 16.3 (*)     Neutrophils Absolute 8.2 (*)     All other components within normal limits   BASIC METABOLIC PANEL W/ REFLEX TO MG FOR LOW K   HCG, SERUM, QUALITATIVE       All other labs were withinnormal range or not returned as of this dictation. EMERGENCY DEPARTMENT COURSE and DIFFERENTIAL DIAGNOSIS/MDM:     PMH, Surgical Hx, FH, Social Hx reviewed by myself (ETOH usage, Tobacco usage, Drug usage reviewed by myself, no pertinent Hx)- No Pertinent Hx     Old records were reviewed by me     MDM 82-year-old female with wound dehiscence. Patient has been evaluated at multiple hospitals for this wound by multiple physicians (10/4 [facility] and 10/3 [facility]). Has had labs and CTs done. Was told to follow-up with general surgery but has not followed up. States she has appointment today with Dr. Gely Cummings. The wound itself appears clean, dry, with no pus or drainage. There is no bleeding. Surgery Dr. Tacos Helm has already been consulted and they recommend her following up outpatient. No erythema noted at this time. Patient is afebrile here. Her heart rate initially was elevated in the emergency room secondary to her being anxious and tearful. It came down to around 100. She does have a mild leukocytosis that is probably reactive. She has follow-up with Dr. Gely Cummings today. She had a CT scan 3 days ago that showed no abscess or acute abdominal process. She has had over 25 scans that I can see this year per review. Did not feel scanning her again would be in her best interest as that is a significant amount of radiation. She was given pain medicine in the emergency room. She continued to ask for stronger doses of narcotic pain medicine.   Did not feel that was necessary as she had a soft nontender abdomen. She tolerated p.o. in the emergency room. She will follow-up outpatient with Dr. Ralph De La Cruz today. Told her the importance of following up with Dr. Ralph De La Cruz. She verbalized understanding. Admission offered but patient declined and would like to go home. She has close follow-up with Dr. Ralph De La Cruz therefore feel this is reasonable. Disposition  I estimate there is LOW risk for Sepsis, MI, Stroke, Tamponade, PTX, Toxicity or other life threatening etiology thus I consider the discharge disposition reasonable. The patient is at low risk for mortality based on demographic, history and clinical factors. Given the best available information and clinical assessment, I estimate the risk of hospitalization to be greater than risk of treatment at home. I have explained to the patient that the risk could rapidly change, given precautions for return and instructions. Explained to patient that the risk for mortality is low based on demographic, history and clinical factors. I discussed with patient the results of evaluation in the ED, diagnosis, care, and prognosis. The plan is to discharge to home. Patient is in agreement with plan and questions have been answered. I also discussed with patient the reasons which may require a return visit and the importance of follow-up care. The patient is well-appearing, nontoxic, and improved at the time of discharge. Patient agrees to call to arrange follow-up care as directed. Patient understands to return immediately for worsening/change in symptoms. I PERSONALLY SAW THE PATIENT AND PERFORMED A SUBSTANTIVE PORTION OF THE VISIT INCLUDING ALL ASPECTS OF THE MEDICAL DECISION MAKING PROCESS. The primary clinician of record [employer]   Total Critical Caretime was 35 minutes, excluding separately reportable procedures.   There was a high probability of clinically significant/life threatening deterioration in the patient's condition which required my urgent intervention. CRITICAL CARE  I personally saw the patient and independently provided 35 minutes of non-concurrent critical care out of the total shared critical care time provided. This excludes seperately billable procedures. Critical care time was provided for patient as above that required close evaluation and/or intervention with concern for potential patient decompensation. PROCEDURES:  Unlessotherwise noted below, none    FINAL IMPRESSION      1.  Encounter for wound care          DISPOSITION/PLAN   DISPOSITION      PATIENT REFERRED TO:  Please see Dr Anjana Blankenship today at your appoinment          DISCHARGE MEDICATIONS:  New Prescriptions    No medications on file          (Please note that portions ofthis note were completed with a voice recognition program.  Efforts were made to edit the dictations but occasionally words are mis-transcribed.)    Olaf Williamson MD(electronically signed)  Attending Emergency Physician          Olaf Williamson MD  10/07/22 2157

## 2022-10-07 ENCOUNTER — CARE COORDINATION (OUTPATIENT)
Dept: CASE MANAGEMENT | Age: 42
End: 2022-10-07

## 2022-10-07 ASSESSMENT — ENCOUNTER SYMPTOMS
VOMITING: 0
COUGH: 0
ABDOMINAL PAIN: 0
CONSTIPATION: 0
EYE ITCHING: 0
COLOR CHANGE: 0
SHORTNESS OF BREATH: 0
EYE DISCHARGE: 0

## 2022-10-07 NOTE — CARE COORDINATION
Spoke with Petrona. She states she could not make it to her appointment with  because her cousin ( who provides her transportation) works till noon. She states she was on her way with her cousin yesterday to an OB appointment and states she almost passed out so her cousin dropped her off at the ED at [facility]. She was discharged. Petrona states her abdominal incision remains open with \"green\" drainage. She states the wound smells \"like a dead body\". Petrona states she has an appointment with  on Monday 10/10/2022. She states it is at 2:30pm - her cousin will provide her transportation. CT team will follow.     Grecia Cote RN BSN  Care Transition Nurse  [phone]

## 2022-10-09 ENCOUNTER — HOSPITAL ENCOUNTER (INPATIENT)
Age: 42
LOS: 2 days | Discharge: HOME | DRG: 721 | End: 2022-10-11
Attending: EMERGENCY MEDICINE | Admitting: STUDENT IN AN ORGANIZED HEALTH CARE EDUCATION/TRAINING PROGRAM
Payer: COMMERCIAL

## 2022-10-09 ENCOUNTER — APPOINTMENT (OUTPATIENT)
Dept: CT IMAGING | Age: 42
DRG: 721 | End: 2022-10-09
Payer: COMMERCIAL

## 2022-10-09 ENCOUNTER — APPOINTMENT (OUTPATIENT)
Dept: GENERAL RADIOLOGY | Age: 42
DRG: 721 | End: 2022-10-09
Payer: COMMERCIAL

## 2022-10-09 ENCOUNTER — TELEPHONE (OUTPATIENT)
Dept: PRIMARY CARE CLINIC | Age: 42
End: 2022-10-09

## 2022-10-09 DIAGNOSIS — L03.311 ABDOMINAL WALL CELLULITIS: Primary | ICD-10-CM

## 2022-10-09 PROBLEM — E11.9 DM2 (DIABETES MELLITUS, TYPE 2) (HCC): Status: ACTIVE | Noted: 2022-10-09

## 2022-10-09 PROBLEM — I10 HTN (HYPERTENSION): Status: ACTIVE | Noted: 2022-10-09

## 2022-10-09 PROBLEM — L03.90 CELLULITIS: Status: ACTIVE | Noted: 2022-10-09

## 2022-10-09 LAB
A/G RATIO: 1.4 (ref 1.1–2.2)
ALBUMIN SERPL-MCNC: 4.5 G/DL (ref 3.4–5)
ALP BLD-CCNC: 142 U/L (ref 40–129)
ALT SERPL-CCNC: 23 U/L (ref 10–40)
ANION GAP SERPL CALCULATED.3IONS-SCNC: 15 MMOL/L (ref 3–16)
AST SERPL-CCNC: 8 U/L (ref 15–37)
BASE EXCESS VENOUS: -0.5 MMOL/L
BASOPHILS ABSOLUTE: 0.1 K/UL (ref 0–0.2)
BASOPHILS RELATIVE PERCENT: 0.8 %
BILIRUB SERPL-MCNC: 0.3 MG/DL (ref 0–1)
BUN BLDV-MCNC: 14 MG/DL (ref 7–20)
CALCIUM SERPL-MCNC: 9.6 MG/DL (ref 8.3–10.6)
CARBOXYHEMOGLOBIN: 2.8 %
CHLORIDE BLD-SCNC: 102 MMOL/L (ref 99–110)
CO2: 24 MMOL/L (ref 21–32)
CREAT SERPL-MCNC: 0.8 MG/DL (ref 0.6–1.1)
EOSINOPHILS ABSOLUTE: 0.3 K/UL (ref 0–0.6)
EOSINOPHILS RELATIVE PERCENT: 2.5 %
GFR AFRICAN AMERICAN: >60
GFR NON-AFRICAN AMERICAN: >60
GLUCOSE BLD-MCNC: 130 MG/DL (ref 70–99)
HCO3 VENOUS: 25 MMOL/L (ref 23–29)
HCT VFR BLD CALC: 44.9 % (ref 36–48)
HEMOGLOBIN: 14.8 G/DL (ref 12–16)
LACTIC ACID, SEPSIS: 1.8 MMOL/L (ref 0.4–1.9)
LYMPHOCYTES ABSOLUTE: 2.6 K/UL (ref 1–5.1)
LYMPHOCYTES RELATIVE PERCENT: 22.9 %
MCH RBC QN AUTO: 28.3 PG (ref 26–34)
MCHC RBC AUTO-ENTMCNC: 33.1 G/DL (ref 31–36)
MCV RBC AUTO: 85.6 FL (ref 80–100)
METHEMOGLOBIN VENOUS: 0.4 %
MONOCYTES ABSOLUTE: 0.9 K/UL (ref 0–1.3)
MONOCYTES RELATIVE PERCENT: 8.1 %
NEUTROPHILS ABSOLUTE: 7.5 K/UL (ref 1.7–7.7)
NEUTROPHILS RELATIVE PERCENT: 65.7 %
O2 SAT, VEN: 92 %
O2 THERAPY: NORMAL
PCO2, VEN: 42 MMHG (ref 40–50)
PDW BLD-RTO: 16.3 % (ref 12.4–15.4)
PH VENOUS: 7.38 (ref 7.35–7.45)
PLATELET # BLD: 308 K/UL (ref 135–450)
PMV BLD AUTO: 7.3 FL (ref 5–10.5)
PO2, VEN: 64 MMHG
POTASSIUM SERPL-SCNC: 3.9 MMOL/L (ref 3.5–5.1)
RBC # BLD: 5.24 M/UL (ref 4–5.2)
SODIUM BLD-SCNC: 141 MMOL/L (ref 136–145)
TCO2 CALC VENOUS: 26 MMOL/L
TOTAL PROTEIN: 7.7 G/DL (ref 6.4–8.2)
WBC # BLD: 11.4 K/UL (ref 4–11)

## 2022-10-09 PROCEDURE — 85025 COMPLETE CBC W/AUTO DIFF WBC: CPT

## 2022-10-09 PROCEDURE — 71045 X-RAY EXAM CHEST 1 VIEW: CPT

## 2022-10-09 PROCEDURE — 96375 TX/PRO/DX INJ NEW DRUG ADDON: CPT

## 2022-10-09 PROCEDURE — 6360000002 HC RX W HCPCS: Performed by: STUDENT IN AN ORGANIZED HEALTH CARE EDUCATION/TRAINING PROGRAM

## 2022-10-09 PROCEDURE — 2580000003 HC RX 258: Performed by: STUDENT IN AN ORGANIZED HEALTH CARE EDUCATION/TRAINING PROGRAM

## 2022-10-09 PROCEDURE — 6360000004 HC RX CONTRAST MEDICATION: Performed by: EMERGENCY MEDICINE

## 2022-10-09 PROCEDURE — 96361 HYDRATE IV INFUSION ADD-ON: CPT

## 2022-10-09 PROCEDURE — 74177 CT ABD & PELVIS W/CONTRAST: CPT

## 2022-10-09 PROCEDURE — 2580000003 HC RX 258: Performed by: EMERGENCY MEDICINE

## 2022-10-09 PROCEDURE — 1200000000 HC SEMI PRIVATE

## 2022-10-09 PROCEDURE — 96365 THER/PROPH/DIAG IV INF INIT: CPT

## 2022-10-09 PROCEDURE — 6360000002 HC RX W HCPCS: Performed by: EMERGENCY MEDICINE

## 2022-10-09 PROCEDURE — 80053 COMPREHEN METABOLIC PANEL: CPT

## 2022-10-09 PROCEDURE — 99285 EMERGENCY DEPT VISIT HI MDM: CPT

## 2022-10-09 PROCEDURE — 36415 COLL VENOUS BLD VENIPUNCTURE: CPT

## 2022-10-09 PROCEDURE — 2500000003 HC RX 250 WO HCPCS: Performed by: EMERGENCY MEDICINE

## 2022-10-09 PROCEDURE — 82803 BLOOD GASES ANY COMBINATION: CPT

## 2022-10-09 PROCEDURE — 87040 BLOOD CULTURE FOR BACTERIA: CPT

## 2022-10-09 PROCEDURE — 83605 ASSAY OF LACTIC ACID: CPT

## 2022-10-09 RX ORDER — OXYCODONE HYDROCHLORIDE 5 MG/1
5 TABLET ORAL EVERY 6 HOURS PRN
COMMUNITY

## 2022-10-09 RX ORDER — ONDANSETRON 2 MG/ML
4 INJECTION INTRAMUSCULAR; INTRAVENOUS ONCE
Status: COMPLETED | OUTPATIENT
Start: 2022-10-09 | End: 2022-10-09

## 2022-10-09 RX ORDER — 0.9 % SODIUM CHLORIDE 0.9 %
30 INTRAVENOUS SOLUTION INTRAVENOUS ONCE
Status: COMPLETED | OUTPATIENT
Start: 2022-10-09 | End: 2022-10-09

## 2022-10-09 RX ORDER — DIPHENHYDRAMINE HYDROCHLORIDE 50 MG/ML
25 INJECTION INTRAMUSCULAR; INTRAVENOUS ONCE
Status: COMPLETED | OUTPATIENT
Start: 2022-10-09 | End: 2022-10-09

## 2022-10-09 RX ORDER — INSULIN LISPRO 100 [IU]/ML
0-4 INJECTION, SOLUTION INTRAVENOUS; SUBCUTANEOUS NIGHTLY
Status: DISCONTINUED | OUTPATIENT
Start: 2022-10-09 | End: 2022-10-10

## 2022-10-09 RX ORDER — LINEZOLID 2 MG/ML
600 INJECTION, SOLUTION INTRAVENOUS EVERY 12 HOURS
Status: DISCONTINUED | OUTPATIENT
Start: 2022-10-09 | End: 2022-10-10

## 2022-10-09 RX ORDER — CLINDAMYCIN PHOSPHATE 900 MG/50ML
900 INJECTION INTRAVENOUS ONCE
Status: COMPLETED | OUTPATIENT
Start: 2022-10-09 | End: 2022-10-09

## 2022-10-09 RX ORDER — SODIUM CHLORIDE 0.9 % (FLUSH) 0.9 %
5-40 SYRINGE (ML) INJECTION EVERY 12 HOURS SCHEDULED
Status: DISCONTINUED | OUTPATIENT
Start: 2022-10-09 | End: 2022-10-11 | Stop reason: HOSPADM

## 2022-10-09 RX ORDER — GABAPENTIN 400 MG/1
400 CAPSULE ORAL 3 TIMES DAILY
Status: DISCONTINUED | OUTPATIENT
Start: 2022-10-09 | End: 2022-10-11 | Stop reason: HOSPADM

## 2022-10-09 RX ORDER — ATORVASTATIN CALCIUM 20 MG/1
20 TABLET, FILM COATED ORAL DAILY
Status: DISCONTINUED | OUTPATIENT
Start: 2022-10-10 | End: 2022-10-11 | Stop reason: HOSPADM

## 2022-10-09 RX ORDER — SODIUM CHLORIDE 9 MG/ML
INJECTION, SOLUTION INTRAVENOUS PRN
Status: DISCONTINUED | OUTPATIENT
Start: 2022-10-09 | End: 2022-10-09

## 2022-10-09 RX ORDER — HYDROCHLOROTHIAZIDE 25 MG/1
25 TABLET ORAL EVERY EVENING
COMMUNITY
End: 2022-10-09

## 2022-10-09 RX ORDER — SODIUM CHLORIDE 9 MG/ML
INJECTION, SOLUTION INTRAVENOUS PRN
Status: DISCONTINUED | OUTPATIENT
Start: 2022-10-09 | End: 2022-10-10

## 2022-10-09 RX ORDER — ENOXAPARIN SODIUM 100 MG/ML
40 INJECTION SUBCUTANEOUS DAILY
Status: DISCONTINUED | OUTPATIENT
Start: 2022-10-10 | End: 2022-10-11 | Stop reason: HOSPADM

## 2022-10-09 RX ORDER — SODIUM CHLORIDE 9 MG/ML
INJECTION, SOLUTION INTRAVENOUS CONTINUOUS
Status: ACTIVE | OUTPATIENT
Start: 2022-10-09 | End: 2022-10-10

## 2022-10-09 RX ORDER — POLYETHYLENE GLYCOL 3350 17 G/17G
17 POWDER, FOR SOLUTION ORAL DAILY PRN
Status: DISCONTINUED | OUTPATIENT
Start: 2022-10-09 | End: 2022-10-11 | Stop reason: HOSPADM

## 2022-10-09 RX ORDER — HYDROXYZINE HYDROCHLORIDE 25 MG/1
25 TABLET, FILM COATED ORAL 3 TIMES DAILY PRN
COMMUNITY

## 2022-10-09 RX ORDER — SODIUM CHLORIDE 0.9 % (FLUSH) 0.9 %
5-40 SYRINGE (ML) INJECTION PRN
Status: DISCONTINUED | OUTPATIENT
Start: 2022-10-09 | End: 2022-10-11 | Stop reason: HOSPADM

## 2022-10-09 RX ORDER — MORPHINE SULFATE 4 MG/ML
4 INJECTION, SOLUTION INTRAMUSCULAR; INTRAVENOUS ONCE
Status: COMPLETED | OUTPATIENT
Start: 2022-10-09 | End: 2022-10-09

## 2022-10-09 RX ORDER — INSULIN LISPRO 100 [IU]/ML
0-4 INJECTION, SOLUTION INTRAVENOUS; SUBCUTANEOUS
Status: DISCONTINUED | OUTPATIENT
Start: 2022-10-10 | End: 2022-10-10

## 2022-10-09 RX ORDER — DEXTROSE MONOHYDRATE 100 MG/ML
INJECTION, SOLUTION INTRAVENOUS CONTINUOUS PRN
Status: DISCONTINUED | OUTPATIENT
Start: 2022-10-09 | End: 2022-10-10

## 2022-10-09 RX ORDER — SODIUM CHLORIDE 0.9 % (FLUSH) 0.9 %
5-40 SYRINGE (ML) INJECTION PRN
Status: DISCONTINUED | OUTPATIENT
Start: 2022-10-09 | End: 2022-10-09

## 2022-10-09 RX ORDER — GUAIFENESIN/DEXTROMETHORPHAN 100-10MG/5
5 SYRUP ORAL EVERY 4 HOURS PRN
Status: CANCELLED | OUTPATIENT
Start: 2022-10-09

## 2022-10-09 RX ORDER — ONDANSETRON 2 MG/ML
4 INJECTION INTRAMUSCULAR; INTRAVENOUS EVERY 6 HOURS PRN
Status: DISCONTINUED | OUTPATIENT
Start: 2022-10-09 | End: 2022-10-11 | Stop reason: HOSPADM

## 2022-10-09 RX ORDER — SODIUM CHLORIDE 0.9 % (FLUSH) 0.9 %
5-40 SYRINGE (ML) INJECTION EVERY 12 HOURS SCHEDULED
Status: DISCONTINUED | OUTPATIENT
Start: 2022-10-09 | End: 2022-10-09

## 2022-10-09 RX ADMIN — SODIUM CHLORIDE: 9 INJECTION, SOLUTION INTRAVENOUS at 22:16

## 2022-10-09 RX ADMIN — LINEZOLID 600 MG: 600 INJECTION, SOLUTION INTRAVENOUS at 21:03

## 2022-10-09 RX ADMIN — ONDANSETRON 4 MG: 2 INJECTION INTRAMUSCULAR; INTRAVENOUS at 22:16

## 2022-10-09 RX ADMIN — HYDROMORPHONE HYDROCHLORIDE 0.5 MG: 1 INJECTION, SOLUTION INTRAMUSCULAR; INTRAVENOUS; SUBCUTANEOUS at 22:15

## 2022-10-09 RX ADMIN — IOPAMIDOL 75 ML: 755 INJECTION, SOLUTION INTRAVENOUS at 18:54

## 2022-10-09 RX ADMIN — ONDANSETRON 4 MG: 2 INJECTION INTRAMUSCULAR; INTRAVENOUS at 18:33

## 2022-10-09 RX ADMIN — CEFEPIME 2000 MG: 2 INJECTION, POWDER, FOR SOLUTION INTRAVENOUS at 22:24

## 2022-10-09 RX ADMIN — CLINDAMYCIN PHOSPHATE 900 MG: 900 INJECTION, SOLUTION INTRAVENOUS at 17:49

## 2022-10-09 RX ADMIN — SODIUM CHLORIDE 1000 ML: 9 INJECTION, SOLUTION INTRAVENOUS at 17:45

## 2022-10-09 RX ADMIN — Medication 6.25 MG: at 23:21

## 2022-10-09 RX ADMIN — MORPHINE SULFATE 4 MG: 4 INJECTION, SOLUTION INTRAMUSCULAR; INTRAVENOUS at 18:32

## 2022-10-09 RX ADMIN — DIPHENHYDRAMINE HYDROCHLORIDE 25 MG: 50 INJECTION, SOLUTION INTRAMUSCULAR; INTRAVENOUS at 18:32

## 2022-10-09 ASSESSMENT — PAIN DESCRIPTION - LOCATION
LOCATION: ABDOMEN

## 2022-10-09 ASSESSMENT — PAIN - FUNCTIONAL ASSESSMENT: PAIN_FUNCTIONAL_ASSESSMENT: 0-10

## 2022-10-09 ASSESSMENT — PAIN DESCRIPTION - ORIENTATION: ORIENTATION: LOWER

## 2022-10-09 ASSESSMENT — PAIN SCALES - GENERAL
PAINLEVEL_OUTOF10: 5
PAINLEVEL_OUTOF10: 9

## 2022-10-09 NOTE — ED TRIAGE NOTES
Pt in from home for abd pain x2 days. States is getting worse. Had hernia surgery end of Sept, pt states site is inflamed and swelled. States fever this morning of 103, took tylenol.

## 2022-10-09 NOTE — ED NOTES
Patient requesting medication for nausea, states to this RN that even though she has an allergy to compazine she can tolerate phenergan. Order obtained from Dr. Jeremias Crouch.       Fernando Gamez RN  10/09/22 7001

## 2022-10-09 NOTE — PROGRESS NOTES
Patient called on-call provider, stated recent surgery, abdominal incision appears warm to touch noting foul smell, green bloody drainage fever 103 relieved by Tylenol, reporting nausea and vomiting. Patient feels wound is infected she states she did speak with the surgeon and they said that they would follow-up with her at her appointment tomorrow at 2 PM, however patient is extremely anxious and concerned for infection. Patient is u nable to take oral antibiotics. Patient has concerns for needing IV antibiotics patient was advised to seek emergent care.

## 2022-10-09 NOTE — ED PROVIDER NOTES
[facility]  eMERGENCY dEPARTMENT eNCOUnter      Pt Name: Beau Pabon  MRN: [id number]  [place] 1980  Date of evaluation: 10/9/2022  Provider: Aracely Caballero MD    CHIEF COMPLAINT       Chief Complaint   Patient presents with    Abdominal Pain     Pt in from home for abd pain x2 days. States is getting worse. Had hernia surgery end of Sept, pt states site is inflamed and swelled. States fever this morning of 103, took tylenol. CRITICAL CARE TIME   Total Critical Care time was 0 minutes, excluding separately reportable procedures. There was a high probability of clinically significant/life threatening deterioration in the patient's condition which required my urgent intervention. HISTORY OF PRESENT ILLNESS  (Location/Symptom, Timing/Onset, Context/Setting, Quality, Duration, Modifying Factors, Severity.)   Beau Pabon is a 38 y.o. female who presents to the emergency department of fever, abdominal pain, abdominal wall swelling, and drainage from her surgical incision. Patient is status post laparoscopic lysis of adhesions and removal of subcutaneous abdominal wall mass on 9/14/2022 by Dr. Dav Zayas. She states in the last 2 days she has had increased swelling in her lower abdomen. She has had redness and increased warmth across her lower abdomen. The lower abdominal wall incision has opened up and has had purulent drainage. She states her temperature last night was up to 103. Had chills and body aches. She feels like her heart is racing. Nursing Notes were reviewed and I agree. REVIEW OF SYSTEMS    (2-9 systems for level 4, 10 or more for level 5)     General: Fever chills and body aches. HEENT: Negative. Cardiovascular: No chest pain. Heart is racing. Pulmonary: No shortness of breath. No Cough. Abdomen: Lower abdominal pains. Swelling of her lower abdomen. She had some nausea and vomiting last night.   Skin: Redness of her lower abdomen. Drainage from her lower abdominal wound. Neuro: No headache or dizziness. Except as noted above the remainder of the review of systems was reviewed and negative. PAST MEDICAL HISTORY     Past Medical History:   Diagnosis Date    ADHD (attention deficit hyperactivity disorder)     Depression with anxiety     Diabetes mellitus ([address]     pre-diabetes    Difficult intubation     airway swelled up    Encounter for imaging to screen for metal prior to MRI 2021    MRI Conditional [employer] Non-Programmable shunt model#[id number] implanted 10/30/2020 at [facility]. Normal Mode. 1.5T or 3.0T. ESBL (extended spectrum beta-lactamase) producing bacteria infection 2019    urine    Functional ovarian cysts 2008    rt ovary cyst x 2 yrs.     Headache(784.0)     migraines    History of blood transfusion     at birth    History of kidney stones     History of PCOS     had hysterectomy in 2016    Hydrocephalus [facility])     Hyperlipidemia     Irritable bowel syndrome 2004    had colonoscopy about 6 yrs ago    Meningitis     Neutrophilic leukocytosis     Nicotine dependence     PONV (postoperative nausea and vomiting)     very nauseated and sometimes wakes up with a Migraine--happened once after brain surgery    Primary osteoarthritis of left knee 2016    S/P cone biopsy of cervix 2004    Scoliosis 1990.s    Seizures ([address]     twice--last one in 2022     (ventriculoperitoneal) shunt status     hydrocephalus f/w Neurosurgeon at [facility]    Wears glasses     reading         SURGICAL HISTORY       Past Surgical History:   Procedure Laterality Date    ABDOMEN SURGERY N/A 2021    REMOVAL OF ABDOMINAL WALL MASS performed by Amie Holland MD at [facility]      appendicitis     SECTION  2005    placenta previa    CHOLECYSTECTOMY      COLONOSCOPY  2004    COLPOSCOPY  2004    CSF SHUNT      replaced at age 17    CYSTOSCOPY      stone removal High Blood Pressure Maternal Grandmother     High Cholesterol Maternal Grandmother     Heart Disease Maternal Grandfather     High Blood Pressure Maternal Grandfather     Heart Disease Paternal Grandmother     Stroke Paternal Grandfather     Depression Sister     Cirrhosis Father     Rheum Arthritis Neg Hx     Osteoarthritis Neg Hx     Asthma Neg Hx     Breast Cancer Neg Hx     Cancer Neg Hx     Heart Failure Neg Hx     Hypertension Neg Hx     Migraines Neg Hx     Ovarian Cancer Neg Hx     Rashes/Skin Problems Neg Hx     Seizures Neg Hx     Thyroid Disease Neg Hx           SOCIAL HISTORY       Social History     Socioeconomic History    Marital status: Single     Spouse name: None    Number of children: None    Years of education: None    Highest education level: None   Occupational History    Occupation: disability, SSI    Tobacco Use    Smoking status: Every Day     Packs/day: 0.50     Years: 18.00     Pack years: 9.00     Types: Cigarettes    Smokeless tobacco: Never   Vaping Use    Vaping Use: Never used   Substance and Sexual Activity    Alcohol use: No     Alcohol/week: 0.0 standard drinks    Drug use: Never    Sexual activity: Not Currently     Partners: Male         PHYSICAL EXAM    (up to 7 for level 4, 8 or more for level 5)     ED Triage Vitals [10/09/22 1612]   BP Temp Temp Source Heart Rate Resp SpO2 Height Weight   (!) 153/101 99 °F (37.2 °C) Oral (!) 122 16 99 % -- 167 lb 8.8 oz (76 kg)       General: Alert moderately obese white female no acute distress. Head: Atraumatic and normocephalic. Eyes: General: Alert moderately obese white female no acute distress. Head: Atraumatic and normocephalic. Eyes: No conjunctival injection. No pallor. Pupils equal round reactive. ENT: Chucky Husain is clear. Oropharynx moist without erytheNeck: Supple, nontender, no adenopathy. Heart: Tachycardic, regular, no murmurs or gallops noted. Lungs: Breath sounds equal bilaterally and clear.   Abdomen: Obese, erythema and induration across the lower abdomen bilaterally. She has a 2 cm wound in her right lower abdomen that is dehisced. There is a small amount of purulent drainage. There is no fluctuance. No flank tenderness. Skin: Erythema of the lower abdominal wall bilaterally. Increased warmth. Musculoskeletal: No extremity edema. Intact distal pulses. Neuro: Awake, alert, oriented. No focal motor deficits. DIFFERENTIAL DIAGNOSIS   Differential includes cellulitis, abscess, sepsis, other. DIAGNOSTIC RESULTS     EKG: All EKG's are interpreted by Andrew Xiao MD in the absence of a cardiologist.        RADIOLOGY:   Non-plain film images such as CT, Ultrasound and MRI are read by the radiologist. Plain radiographic images are visualized and preliminarily interpreted [place] MD Dameon with the below findings:        Interpretation per the Radiologist below, if available at the time of this note:    CT ABDOMEN PELVIS W IV CONTRAST Additional Contrast? None   Preliminary Result   1. In the anterior pelvic wall an area of skin thickening with ulceration as   well as subcutaneous inflammatory changes. No evidence of abscess. Severe   cellulitis is likely. 2. No evidence of significant hernia. 3. Right nephrolithiasis but no acute obstructive uropathy or pyelonephritis. XR CHEST PORTABLE   Final Result   No radiographic evidence of an acute cardiopulmonary process.                ED BEDSIDE ULTRASOUND:   Performed by ED Physician - none    LABS:  Labs Reviewed   CBC WITH AUTO DIFFERENTIAL - Abnormal; Notable for the following components:       Result Value    WBC 11.4 (*)     RBC 5.24 (*)     RDW 16.3 (*)     All other components within normal limits   COMPREHENSIVE METABOLIC PANEL - Abnormal; Notable for the following components:    Glucose 130 (*)     Alkaline Phosphatase 142 (*)     AST 8 (*)     All other components within normal limits   CULTURE, BLOOD 1   CULTURE, BLOOD 2   LACTATE, SEPSIS   LACTATE, SEPSIS   BLOOD GAS, VENOUS   URINALYSIS WITH REFLEX TO CULTURE       All other labs were within normal range or not returned as of this dictation. EMERGENCY DEPARTMENT COURSE and DIFFERENTIAL DIAGNOSIS/MDM:   Vitals:    Vitals:    10/09/22 1612 10/09/22 1800 10/09/22 1832 10/09/22 1845   BP: (!) 153/101 (!) 88/14  117/78   Pulse: (!) 122 (!) 101  97   Resp: 16 12 24 17   Temp: 99 °F (37.2 °C)      TempSrc: Oral      SpO2: 99% 100%  97%   Weight: 167 lb 8.8 oz (76 kg)          As documented above the patient presents with a history of fever, lower abdominal redness, swelling, wound dehiscence, drainage. She is status post surgery as above. She has significant erythema and induration her lower abdomen. She has a wound it dehisced. She has some purulent drainage. The drainage was cultured. She was afebrile here. She was tachycardic on arrival.  Her white blood cell count is elevated 11,000. Her lactic acid is normal.  She has findings on CT consistent with a lower abdominal cellulitis. It does not look like a necrotizing fasciitis. No abscess. She was medicated for pain. She was given IV fluids. Blood cultures were obtained. She was given IV clindamycin. Patient will require admission for further treatment. She does not meet sepsis criteria. The hospitalist was consulted for admission. I discussed the case with Dr. Martinez Alatorre. Test results, diagnosis, and treatment plan were discussed with the patient. She understands her treatment plan and need for admission is agreeable    Is this patient to be included in the SEP-1 Core Measure due to severe sepsis or septic shock? No  SEP-1 CORE MEASURE DATA      Sepsis Criteria   Severe Sepsis Criteria   Septic Shock Criteria     Must be confirmed or suspected to move forward with diagnosis of sepsis.     Must meet 2:    [] Temperature > 100.9 F (38.3 C)        or < 96.8 F (36 C)  [x] HR > 90  [] RR > 20  [] WBC > 12 or < 4 or 10% bands      AND:      [x] Infection Confirmed or        Suspected. Must meet 1:    [] Lactate > 2       or   [] Signs of Organ Dysfunction:    - SBP < 90 or MAP < 65  - Altered mental status  - Creatinine > 2 or increased from      baseline  - Urine Output < 0.5 ml/kg/hr  - Bilirubin > 2  - INR > 1.5 (not anticoagulated)  - Platelets < 142,514  - Acute Respiratory Failure as     evidenced by new need for NIPPV     or mechanical ventilation      [x] No criteria met for Severe Sepsis. Must meet 1:    [] Lactate > 4        or   [] SBP < 90 or MAP < 65 for at        least two readings in the first        hour after fluid bolus        administration      [] Vasopressors initiated (if hypotension persists after fluid resuscitation)        [x] No criteria met for Septic Shock. Patient Vitals for the past 6 hrs:   BP Temp Pulse Resp SpO2 Weight Weight Method Percent Weight Change   10/09/22 1612 (!) 153/101 99 °F (37.2 °C) (!) 122 16 99 % 167 lb 8.8 oz (76 kg) Actual;Bed scale 0   10/09/22 1800 (!) 88/14 -- (!) 101 12 100 % -- -- --   10/09/22 1832 -- -- -- 24 -- -- -- --   10/09/22 1845 117/78 -- 97 17 97 % -- -- --      Recent Labs     10/09/22  1738   WBC 11.4*   CREATININE 0.8   BILITOT 0.3               CONSULTS:  None    PROCEDURES:  None    FINAL IMPRESSION      1. Abdominal wall cellulitis          DISPOSITION/PLAN   DISPOSITION Decision To Admit 10/09/2022 07:59:32 PM      PATIENT REFERRED TO:  No follow-up provider specified.     DISCHARGE MEDICATIONS:  New Prescriptions    No medications on file       (Please note that portions of this note were completed with a voice recognition program.  Efforts were made to edit the dictations but occasionally words are mis-transcribed.)    Alma Rosa Obando MD  Attending Emergency Physician        Elizabeth Castillo MD  10/09/22 [id number]

## 2022-10-09 NOTE — ED NOTES
Pt states been vomiting the last few days and unable to keep anything down.       Matthew Hall RN  10/09/22 [id number]

## 2022-10-10 PROBLEM — Z88.1 ALLERGY TO MULTIPLE ANTIBIOTICS: Status: ACTIVE | Noted: 2022-10-10

## 2022-10-10 PROBLEM — Z98.890 H/O LAPAROSCOPY: Status: ACTIVE | Noted: 2022-10-10

## 2022-10-10 PROBLEM — Z86.19 HISTORY OF EXTENDED-SPECTRUM BETA-LACTAMASE PRODUCING ESCHERICHIA COLI INFECTION: Status: ACTIVE | Noted: 2022-10-10

## 2022-10-10 LAB
ALBUMIN SERPL-MCNC: 3.4 G/DL (ref 3.4–5)
ANION GAP SERPL CALCULATED.3IONS-SCNC: 13 MMOL/L (ref 3–16)
BASOPHILS ABSOLUTE: 0.1 K/UL (ref 0–0.2)
BASOPHILS RELATIVE PERCENT: 0.9 %
BILIRUBIN URINE: NEGATIVE
BLOOD, URINE: NEGATIVE
BUN BLDV-MCNC: 16 MG/DL (ref 7–20)
CALCIUM SERPL-MCNC: 8.6 MG/DL (ref 8.3–10.6)
CHLORIDE BLD-SCNC: 103 MMOL/L (ref 99–110)
CLARITY: CLEAR
CO2: 21 MMOL/L (ref 21–32)
COLOR: YELLOW
CREAT SERPL-MCNC: 0.8 MG/DL (ref 0.6–1.1)
EOSINOPHILS ABSOLUTE: 0.3 K/UL (ref 0–0.6)
EOSINOPHILS RELATIVE PERCENT: 4.5 %
GFR AFRICAN AMERICAN: >60
GFR NON-AFRICAN AMERICAN: >60
GLUCOSE BLD-MCNC: 115 MG/DL (ref 70–99)
GLUCOSE BLD-MCNC: 143 MG/DL (ref 70–99)
GLUCOSE URINE: NEGATIVE MG/DL
HCT VFR BLD CALC: 36.9 % (ref 36–48)
HEMOGLOBIN: 12.4 G/DL (ref 12–16)
KETONES, URINE: NEGATIVE MG/DL
LEUKOCYTE ESTERASE, URINE: NEGATIVE
LYMPHOCYTES ABSOLUTE: 2.2 K/UL (ref 1–5.1)
LYMPHOCYTES RELATIVE PERCENT: 31 %
MAGNESIUM: 2 MG/DL (ref 1.8–2.4)
MCH RBC QN AUTO: 28.5 PG (ref 26–34)
MCHC RBC AUTO-ENTMCNC: 33.7 G/DL (ref 31–36)
MCV RBC AUTO: 84.4 FL (ref 80–100)
MICROSCOPIC EXAMINATION: NORMAL
MONOCYTES ABSOLUTE: 0.6 K/UL (ref 0–1.3)
MONOCYTES RELATIVE PERCENT: 8.1 %
NEUTROPHILS ABSOLUTE: 4 K/UL (ref 1.7–7.7)
NEUTROPHILS RELATIVE PERCENT: 55.5 %
NITRITE, URINE: NEGATIVE
PDW BLD-RTO: 16.3 % (ref 12.4–15.4)
PERFORMED ON: ABNORMAL
PH UA: 5.5 (ref 5–8)
PHOSPHORUS: 4 MG/DL (ref 2.5–4.9)
PLATELET # BLD: 248 K/UL (ref 135–450)
PMV BLD AUTO: 7.5 FL (ref 5–10.5)
POTASSIUM SERPL-SCNC: 3.6 MMOL/L (ref 3.5–5.1)
PROTEIN UA: NEGATIVE MG/DL
RBC # BLD: 4.37 M/UL (ref 4–5.2)
REASON FOR REJECTION: NORMAL
REJECTED TEST: NORMAL
SODIUM BLD-SCNC: 137 MMOL/L (ref 136–145)
SPECIFIC GRAVITY UA: 1.05 (ref 1–1.03)
URINE REFLEX TO CULTURE: NORMAL
URINE TYPE: NORMAL
UROBILINOGEN, URINE: 0.2 E.U./DL
WBC # BLD: 7.1 K/UL (ref 4–11)

## 2022-10-10 PROCEDURE — 80069 RENAL FUNCTION PANEL: CPT

## 2022-10-10 PROCEDURE — 94760 N-INVAS EAR/PLS OXIMETRY 1: CPT

## 2022-10-10 PROCEDURE — 2580000003 HC RX 258: Performed by: STUDENT IN AN ORGANIZED HEALTH CARE EDUCATION/TRAINING PROGRAM

## 2022-10-10 PROCEDURE — 99253 IP/OBS CNSLTJ NEW/EST LOW 45: CPT | Performed by: INTERNAL MEDICINE

## 2022-10-10 PROCEDURE — 1200000000 HC SEMI PRIVATE

## 2022-10-10 PROCEDURE — 83735 ASSAY OF MAGNESIUM: CPT

## 2022-10-10 PROCEDURE — 85025 COMPLETE CBC W/AUTO DIFF WBC: CPT

## 2022-10-10 PROCEDURE — APPNB15 APP NON BILLABLE TIME 0-15 MINS: Performed by: PHYSICIAN ASSISTANT

## 2022-10-10 PROCEDURE — 99024 POSTOP FOLLOW-UP VISIT: CPT | Performed by: PHYSICIAN ASSISTANT

## 2022-10-10 PROCEDURE — 6360000002 HC RX W HCPCS: Performed by: STUDENT IN AN ORGANIZED HEALTH CARE EDUCATION/TRAINING PROGRAM

## 2022-10-10 PROCEDURE — 6360000002 HC RX W HCPCS: Performed by: HOSPITALIST

## 2022-10-10 PROCEDURE — 36415 COLL VENOUS BLD VENIPUNCTURE: CPT

## 2022-10-10 PROCEDURE — 81003 URINALYSIS AUTO W/O SCOPE: CPT

## 2022-10-10 RX ORDER — DOXYCYCLINE HYCLATE 100 MG
100 TABLET ORAL EVERY 12 HOURS SCHEDULED
Status: DISCONTINUED | OUTPATIENT
Start: 2022-10-11 | End: 2022-10-11 | Stop reason: HOSPADM

## 2022-10-10 RX ORDER — IBUPROFEN 400 MG/1
400 TABLET ORAL ONCE
Status: DISCONTINUED | OUTPATIENT
Start: 2022-10-10 | End: 2022-10-11 | Stop reason: HOSPADM

## 2022-10-10 RX ORDER — DIPHENHYDRAMINE HYDROCHLORIDE 50 MG/ML
25 INJECTION INTRAMUSCULAR; INTRAVENOUS ONCE
Status: COMPLETED | OUTPATIENT
Start: 2022-10-10 | End: 2022-10-10

## 2022-10-10 RX ADMIN — HYDROMORPHONE HYDROCHLORIDE 0.5 MG: 1 INJECTION, SOLUTION INTRAMUSCULAR; INTRAVENOUS; SUBCUTANEOUS at 02:00

## 2022-10-10 RX ADMIN — ONDANSETRON 4 MG: 2 INJECTION INTRAMUSCULAR; INTRAVENOUS at 15:34

## 2022-10-10 RX ADMIN — ENOXAPARIN SODIUM 40 MG: 100 INJECTION SUBCUTANEOUS at 08:45

## 2022-10-10 RX ADMIN — HYDROMORPHONE HYDROCHLORIDE 0.5 MG: 1 INJECTION, SOLUTION INTRAMUSCULAR; INTRAVENOUS; SUBCUTANEOUS at 14:12

## 2022-10-10 RX ADMIN — HYDROMORPHONE HYDROCHLORIDE 0.5 MG: 1 INJECTION, SOLUTION INTRAMUSCULAR; INTRAVENOUS; SUBCUTANEOUS at 06:29

## 2022-10-10 RX ADMIN — CEFEPIME 2000 MG: 2 INJECTION, POWDER, FOR SOLUTION INTRAVENOUS at 21:25

## 2022-10-10 RX ADMIN — LINEZOLID 600 MG: 600 INJECTION, SOLUTION INTRAVENOUS at 08:50

## 2022-10-10 RX ADMIN — Medication 6.25 MG: at 17:15

## 2022-10-10 RX ADMIN — DIPHENHYDRAMINE HYDROCHLORIDE 25 MG: 50 INJECTION, SOLUTION INTRAMUSCULAR; INTRAVENOUS at 11:50

## 2022-10-10 RX ADMIN — CEFEPIME 2000 MG: 2 INJECTION, POWDER, FOR SOLUTION INTRAVENOUS at 09:55

## 2022-10-10 RX ADMIN — HYDROMORPHONE HYDROCHLORIDE 0.5 MG: 1 INJECTION, SOLUTION INTRAMUSCULAR; INTRAVENOUS; SUBCUTANEOUS at 18:27

## 2022-10-10 RX ADMIN — ONDANSETRON 4 MG: 2 INJECTION INTRAMUSCULAR; INTRAVENOUS at 08:45

## 2022-10-10 RX ADMIN — HYDROMORPHONE HYDROCHLORIDE 0.5 MG: 1 INJECTION, SOLUTION INTRAMUSCULAR; INTRAVENOUS; SUBCUTANEOUS at 10:19

## 2022-10-10 ASSESSMENT — PAIN DESCRIPTION - LOCATION
LOCATION: ABDOMEN

## 2022-10-10 ASSESSMENT — PAIN DESCRIPTION - DESCRIPTORS: DESCRIPTORS: SHARP

## 2022-10-10 ASSESSMENT — PAIN DESCRIPTION - FREQUENCY
FREQUENCY: CONTINUOUS
FREQUENCY: CONTINUOUS

## 2022-10-10 ASSESSMENT — PAIN DESCRIPTION - PAIN TYPE
TYPE: ACUTE PAIN
TYPE: ACUTE PAIN

## 2022-10-10 ASSESSMENT — PAIN SCALES - GENERAL
PAINLEVEL_OUTOF10: 9
PAINLEVEL_OUTOF10: 8
PAINLEVEL_OUTOF10: 9
PAINLEVEL_OUTOF10: 8

## 2022-10-10 ASSESSMENT — PAIN DESCRIPTION - ONSET
ONSET: ON-GOING
ONSET: ON-GOING

## 2022-10-10 ASSESSMENT — PAIN - FUNCTIONAL ASSESSMENT
PAIN_FUNCTIONAL_ASSESSMENT: ACTIVITIES ARE NOT PREVENTED

## 2022-10-10 ASSESSMENT — PAIN DESCRIPTION - ORIENTATION
ORIENTATION: MID;RIGHT
ORIENTATION: ANTERIOR;MID

## 2022-10-10 NOTE — CONSULTS
Infectious Diseases Inpatient Consult Note      Reason for Consult:   Abdominal wall cellulitis s/p recent Hernia surgery  on  9/14/22     Requesting Physician:   Keyla Soto     Primary Care Physician:  FREDRICK Graves CNP    History Obtained From:  [facility] and Patient     CHIEF COMPLAINT:     Chief Complaint   Patient presents with    Abdominal Pain     Pt in from home for abd pain x2 days. States is getting worse. Had hernia surgery end of Sept, pt states site is inflamed and swelled. States fever this morning of 103, took tylenol. HISTORY OF PRESENT ILLNESS:  38 y.o. woman with a past medical history of depression, diabetes, ADHD, hydrocephalus with  shunt in place, history of ESBL infection in the past, scoliosis, seizures, polycystic ovarian syndrome, history of kidney stones, admitted to hospital secondary to abdominal wall cellulitis and fevers \". She recently underwent abdominal surgery with lysis of adhesions and 2 cm mass was removed on 9/14/22. She is now admitted to the hospital secondary to concern for cellulitis of the abdominal incision. She also was complaining of subjective fevers. Labs indicate creatinine 0.8 , blood culture in process lactic acid 1.8, WBC mild elevation 11.1 hemoglobin 14.8. CT abdomen pelvis indicated skin thickening possible inflammatory change but no abscess no evidence of hernia. There is a right nephrolithiasis. We are consulted for antibiotic recommendations            Past Medical History:    Past Medical History:   Diagnosis Date    ADHD (attention deficit hyperactivity disorder) 1988    Depression with anxiety     Diabetes mellitus ([facility] [address]     pre-diabetes    Difficult intubation     airway swelled up    Encounter for imaging to screen for metal prior to MRI 06/01/2021    MRI Conditional Medtronic Non-Programmable shunt model#[id number] implanted 10/30/2020 at [facility]. Normal Mode. 1.5T or 3.0T.     ESBL (extended spectrum beta-lactamase) producing bacteria infection 2019    urine    Functional ovarian cysts 2008    rt ovary cyst x 2 yrs.     Headache(784.0)     migraines    History of blood transfusion     at birth    History of kidney stones     History of PCOS     had hysterectomy in 2016    Hydrocephalus ([address]     Hyperlipidemia     Irritable bowel syndrome 2004    had colonoscopy about 6 yrs ago    Meningitis     Neutrophilic leukocytosis     Nicotine dependence     PONV (postoperative nausea and vomiting)     very nauseated and sometimes wakes up with a Migraine--happened once after brain surgery    Primary osteoarthritis of left knee 2016    S/P cone biopsy of cervix 2004    Scoliosis 1990.s    Seizures ([address]     twice--last one in 2022     (ventriculoperitoneal) shunt status     hydrocephalus f/w Neurosurgeon at [facility]    Wears glasses     reading       Past Surgical History:    Past Surgical History:   Procedure Laterality Date    ABDOMEN SURGERY N/A 2021    REMOVAL OF ABDOMINAL WALL MASS performed by Drew Hutchison MD at [facility]      appendicitis     SECTION      placenta previa    CHOLECYSTECTOMY      COLONOSCOPY  2004    COLPOSCOPY      CSF SHUNT      replaced at age 17    CYSTOSCOPY      stone removal    HEMORRHOID SURGERY      HERNIA REPAIR Bilateral     inguinal    HERNIA REPAIR      hiatal hernia    HYSTERECTOMY, TOTAL ABDOMINAL (CERVIX REMOVED)  2016    TAHBSO, adhesions/PCOS    KNEE ARTHROSCOPY Left 2015    medial meniscectomy, chondroplasty, plica resection    LITHOTRIPSY Bilateral 12/10/2019    OTHER SURGICAL HISTORY  10/19/2016    op lap    VENTRAL HERNIA REPAIR N/A 2022    LAPAROSCOPIC LYSIS OF ADHESIONS AND ABDOMINAL WALL MASS REMOVAL performed by Drew Hutchison MD at [address]      multiple revisions, most recent        Current Medications:    Outpatient Medications Marked as Taking for the 10/9/22 encounter [facility] Encounter)   Medication Sig Dispense Refill    lisdexamfetamine (VYVANSE) 50 MG capsule Take 50 mg by mouth every morning.       oxyCODONE (ROXICODONE) 5 MG immediate release tablet Take 5 mg by mouth every 6 hours as needed for Pain.      hydrOXYzine HCl (ATARAX) 25 MG tablet Take 25 mg by mouth 3 times daily as needed for Itching         Allergies:  Bee venom, Bentyl [dicyclomine hcl], Dicyclomine, Ketorolac tromethamine, Levofloxacin, Maitake, Shiitake mushroom, Sulfa antibiotics, Vancomycin, Zosyn [piperacillin sod-tazobactam so], Adhesive tape, Oxycodone-acetaminophen, Sulfacetamide, Acetaminophen, Butalbital-apap-caff-cod, Ceftaroline, Daptomycin, Fosfomycin tromethamine, Haldol [haloperidol], Ketamine, Methocarbamol, Morphine, Nitrofurantoin, Prochlorperazine, Reglan [metoclopramide], Silicone, and Tazobactam    Immunizations :   Immunization History   Administered Date(s) Administered    COVID-19, PFIZER PURPLE top, DILUTE for use, (age 15 y+), 30mcg/0.3mL 11/10/2021    Influenza Vaccine, unspecified formulation 10/19/2013    Influenza Virus Vaccine 11/04/2010, 10/06/2018, 10/30/2020, 11/02/2021         Social History:     Social History     Tobacco Use    Smoking status: Every Day     Packs/day: 0.50     Years: 18.00     Pack years: 9.00     Types: Cigarettes    Smokeless tobacco: Never   Vaping Use    Vaping Use: Never used   Substance Use Topics    Alcohol use: No     Alcohol/week: 0.0 standard drinks    Drug use: Never     Social History     Tobacco Use   Smoking Status Every Day    Packs/day: 0.50    Years: 18.00    Pack years: 9.00    Types: Cigarettes   Smokeless Tobacco Never      Family History   Problem Relation Age of Onset    High Blood Pressure Mother     Diabetes Mother     High Cholesterol Mother     Depression Mother     Diabetes Maternal Grandmother     High Blood Pressure Maternal Grandmother     High Cholesterol Maternal Grandmother     Heart Disease Maternal Grandfather     High Blood Pressure Maternal Grandfather     Heart Disease Paternal Grandmother     Stroke Paternal Grandfather     Depression Sister     Cirrhosis Father     Rheum Arthritis Neg Hx     Osteoarthritis Neg Hx     Asthma Neg Hx     Breast Cancer Neg Hx     Cancer Neg Hx     Heart Failure Neg Hx     Hypertension Neg Hx     Migraines Neg Hx     Ovarian Cancer Neg Hx     Rashes/Skin Problems Neg Hx     Seizures Neg Hx     Thyroid Disease Neg Hx           REVIEW OF SYSTEMS:      Constitutional:  negative for fevers, chills, night sweats  Eyes:  negative for blurred vision, eye discharge, visual disturbance   HEENT:  negative for hearing loss, ear drainage,nasal congestion  Respiratory:  negative for cough, shortness of breath or hemoptysis   Cardiovascular:  negative for chest pain, palpitations, syncope  Gastrointestinal:  negative for nausea, vomiting, diarrhea, constipation, abdominal pain++   Genitourinary:  negative for frequency, dysuria, urinary incontinence, hematuria  Hematologic/Lymphatic:  negative for easy bruising, bleeding and lymphadenopathy  Allergic/Immunologic:  negative for recurrent infections, angioedema, anaphylaxis   Endocrine:  negative for weight changes, polyuria, polydipsia and polyphagia  Musculoskeletal:  negative for joint  pain, swelling, decreased range of motion  Integumentary: No rashes, skin lesions  Neurological:  negative for headaches, slurred speech, unilateral weakness  Psychiatric: negative for hallucinations,confusion,agitation.      PHYSICAL EXAM:      Vitals:    BP 97/66   Pulse 66   Temp 97.5 °F (36.4 °C) (Oral)   Resp 16   Ht 4' 11\" (1.499 m)   Wt 166 lb 14.2 oz (75.7 kg)   LMP 10/31/2016 (Exact Date)   SpO2 96%   BMI 33.71 kg/m²     General Appearance: alert,in no acute distress, no pallor, no icterus   Skin: warm and dry, no rash or erythema  Head: normocephalic and atraumatic  Eyes: pupils equal, round, and reactive to light, conjunctivae normal  ENT: tympanic membrane, external ear and ear canal normal bilaterally, nose without deformity, nasal mucosa and turbinates normal without polyps  Neck: supple and non-tender without mass, no thyromegaly  no cervical lymphadenopathy  Pulmonary/Chest: clear to auscultation bilaterally- no wheezes, rales or rhonchi, normal air movement, no respiratory distress  Cardiovascular: normal rate, regular rhythm, normal S1 and S2, no murmurs, rubs, clicks, or gallops, no carotid bruits  Abdomen: soft, non-tender, non-distended, normal bowel sounds, no masses or organomegaly  Extremities: no cyanosis, clubbing or edema  Musculoskeletal: normal range of motion, no joint swelling, deformity or tenderness  Integumentary: No rashes, no abnormal skin lesions, no petechiae  Neurologic: reflexes normal and symmetric, no cranial nerve deficit  Psych:  Orientation, sensorium, mood normal   Lines: IV   shunt in place    Laparoscopic incision lower small opening not much drainage locally tender     DATA:    CBC:   Lab Results   Component Value Date    WBC 7.1 10/10/2022    HGB 12.4 10/10/2022    HCT 36.9 10/10/2022    MCV 84.4 10/10/2022     10/10/2022     RENAL:   Lab Results   Component Value Date    CREATININE 0.8 10/10/2022    BUN 16 10/10/2022     10/10/2022    K 3.6 10/10/2022     10/10/2022    CO2 21 10/10/2022     SED RATE:   Lab Results   Component Value Date/Time    SEDRATE 17 05/31/2021 12:08 AM     CK: No results found for: CKTOTAL  CRP: No results found for: CRP  Hepatic Function Panel:   Lab Results   Component Value Date/Time    ALKPHOS 142 10/09/2022 05:38 PM    ALT 23 10/09/2022 05:38 PM    AST 8 10/09/2022 05:38 PM    PROT 7.7 10/09/2022 05:38 PM    PROT 7.3 08/14/2011 07:30 PM    BILITOT 0.3 10/09/2022 05:38 PM    BILIDIR <0.2 07/10/2022 11:24 PM    IBILI see below 07/10/2022 11:24 PM    LABALBU 3.4 10/10/2022 06:26 AM     UA:  Lab Results   Component Value Date/Time    COLORU Yellow 10/10/2022 09:00 AM    CLARITYU Clear 10/10/2022 09:00 AM    GLUCOSEU Negative 10/10/2022 09:00 AM    GLUCOSEU NEGATIVE 10/08/2011 10:10 PM    BILIRUBINUR Negative 10/10/2022 09:00 AM    BILIRUBINUR neg 10/25/2019 12:54 PM    BILIRUBINUR NEGATIVE 10/08/2011 10:10 PM    KETUA Negative 10/10/2022 09:00 AM    SPECGRAV 1.050 10/10/2022 09:00 AM    BLOODU Negative 10/10/2022 09:00 AM    PHUR 5.5 10/10/2022 09:00 AM    PROTEINU Negative 10/10/2022 09:00 AM    UROBILINOGEN 0.2 10/10/2022 09:00 AM    NITRU Negative 10/10/2022 09:00 AM    LEUKOCYTESUR Negative 10/10/2022 09:00 AM    LABMICR Not Indicated 10/10/2022 09:00 AM    URINETYPE NotGiven 10/10/2022 09:00 AM      Urine Microscopic:   Lab Results   Component Value Date/Time    LABCAST 1-3 Hyaline 10/19/2014 10:57 AM    BACTERIA 4+ 09/21/2022 10:38 AM    COMU see below 09/21/2022 10:38 AM    HYALCAST 2 09/21/2022 10:38 AM    WBCUA 8 09/21/2022 10:38 AM    RBCUA 0-2 09/21/2022 10:38 AM    EPIU 19 09/21/2022 10:38 AM     Urine Reflex to Culture:   Lab Results   Component Value Date/Time    URRFLXCULT Not Indicated 10/10/2022 09:00 AM         MICRO: cultures reviewed and updated by me       Blood Culture:   Lab Results   Component Value Date/Time    BC No Growth after 4 days of incubation. 09/25/2022 10:16 PM    BLOODCULT2 No Growth after 4 days of incubation. 09/25/2022 10:16 PM       Viral Culture:    Lab Results   Component Value Date/Time    COVID19 DETECTED 09/21/2022 12:41 PM     Urine Culture: No results for input(s): Carla Solis in the last 72 hours.     Scheduled Meds:   linezolid  600 mg IntraVENous Q12H    cefepime  2,000 mg IntraVENous Q12H    atorvastatin  20 mg Oral Daily    gabapentin  400 mg Oral TID    sodium chloride flush  5-40 mL IntraVENous 2 times per day    enoxaparin  40 mg SubCUTAneous Daily       Continuous Infusions:      PRN Meds:  sodium chloride flush, polyethylene glycol, ondansetron, promethazine, HYDROmorphone **OR** HYDROmorphone    Imaging:   CT ABDOMEN PELVIS W IV CONTRAST Additional Contrast? None   Final Result   1. In the anterior pelvic wall an area of skin thickening with ulceration as   well as subcutaneous inflammatory changes. No evidence of abscess. Severe   cellulitis is likely. 2. No evidence of significant hernia. 3. Right nephrolithiasis but no acute obstructive uropathy or pyelonephritis. XR CHEST PORTABLE   Final Result   No radiographic evidence of an acute cardiopulmonary process. All pertinent images and reports for the current Hospitalization were reviewed by me.     IMPRESSION:    Patient Active Problem List   Diagnosis    Attention deficit hyperactivity disorder (ADHD)     (ventriculoperitoneal) shunt status    Scoliosis    Prediabetes    Tobacco smoker    Onychomycosis    Congenital hydrocephalus (HCC)    Sepsis without acute organ dysfunction (HCC)    Ureteritis    Acute cystitis without hematuria    History of brain shunt    Mood disorder (HCC)    Numbness and tingling in left hand    Diverticulosis    Colitis    Infection due to extended-spectrum beta-lactamase-producing Escherichia coli    Kidney stone    Intermittent small bowel obstruction due to adhesions (HCC)    Abdominal wall mass    Cyst and pseudocyst of pancreas    Abdominal pain    Nausea and vomiting    COVID    Abdominal wall cellulitis    DM2 (diabetes mellitus, type 2) (HCC)    HTN (hypertension)    Cellulitis     Abd wall cellulitis+  Incision scant drainage  Not much cellulitis on exam   Pain very disproportionate to exam findings   Procedure(s):  LAPAROSCOPIC LYSIS OF ADHESIONS AND ABDOMINAL WALL MASS REMOVAL 2 cm  on  9/14/22   H/p  shunt in place  CT abd/pelvis -ve  Blood cx -ve  Fevers at home and record of low grade temp 99F hERE  H/o ESBL on urine cx in the past    Multiple abx allergies     We can choose a short course of oral abx for d/c planning cont local care        Labs, Microbiology, Radiology and pertinent results from current hospitalization and care every where were reviewed by me as a part of the consultation. PLAN :  Cont IV Cefepime   Change to oral Linezolid   Due to allergies choices are limited  If wound looks good we can d/c all abx  Options would be Oral Doxycycline x 100 mg twice a day for x  3  days if necessary     Discussed with patient/Family and Nursing   Risk of Complications/Morbidity: High      Illness(es)/ Infection present that pose threat to bodily function. There is potential for severe exacerbation of infection/side effects of treatment. Therapy requires intensive monitoring for antimicrobial agent toxicity. Thanks for allowing me to participate in your patient's care please call me with any questions or concerns.     Dr. Edson Almendarez MD  [address] [place] [address] Physician  Phone: [phone]   Fax : [phone]

## 2022-10-10 NOTE — PROGRESS NOTES
Pharmacy Medication Reconciliation Note     List of medications Pramod Davidson is currently taking is complete. Source of information:   1. Conversation with patient at bedside  2. EMR    Notes regarding home medications:   1.  Patient reports not being able to take any oral medications in past week due to emesis     Patient denies taking any OTC or herbal medications    Chen Hutchinson, Pharmacy Intern  10/9/2022  8:33 PM

## 2022-10-10 NOTE — CONSULTS
Surgery Consult Note     Zuleika Solitario PA-C  Pt Name: Aminata Obrien  MRN: [id number]  YOB: 1980  Date of evaluation: 10/10/2022  Primary Care Physician: FREDRICK Lai - CNP  Referring Physician. Dr. Sukhi Tarango  Reason for Consultation: Abdominal wall cellulitis  Chief Complaint: Abdominal pain  IMPRESSIONS:   Abdominal pain. Low mid abdomen at surgical incision site. No erythema, drainage, fluctuance, and a small area of induration that would be expected postoperatively was noted  S/P 9/14/22 laparoscopic lysis of adhesions, and removal of subcutaneous abdominal wall mass  WBC count: 11.4 --> 7.1  PLANS:   Monitor and control pain  ID was consulted by Hospitalist   No general surgery needs at this time. SUBJECTIVE:   History of Chief Complaint:    Aminata Obrien is a 38 y.o. female who presents with abdominal pain. This patient underwent a laparoscopic lysis of adhesions, and removal of subcutaneous abdominal wall mass on 9/14/22. After being D/[facility] home, for 2 days, she started having nausea and emesis and came back to the hospital. She was found to be COVID +. She was admitted back to the hospital at that time and treated. Once her pain was controlled and she was able to tolerate a diet she was discharged back home, on 9/30/22. One and a half weeks ago she started having abdominal pain at her lower midline incision. She has been seen in the ED and was to follow up in our office and didn't make that appointment. She came back to the ER yesterday to be further evaluated. She stated she had a temperature at home yesterday of 103. In the ER her temp on arrival was 99.0 and her HR was 122. She was admitted to the hospital last night and we were consulted to further evaluate. She did have a CT scan done yesterday and that showed in the anterior pelvic wall an area of skin thickening with ulceration as well as subcutaneous inflammatory changes, no evidence of an abscess.  Upon evaluation the lower mid abdominal incision site from her surgery, where her pain is there, there was no erythema, drainage, fluctuance, and a small area of induration that would be expected postoperatively was noted. Past Medical History  Reviewed  has a past medical history of ADHD (attention deficit hyperactivity disorder), Depression with anxiety, Diabetes mellitus ([address] Difficult intubation, Encounter for imaging to screen for metal prior to MRI, ESBL (extended spectrum beta-lactamase) producing bacteria infection, Functional ovarian cysts, Headache(784.0), History of blood transfusion, History of kidney stones, History of PCOS, Hydrocephalus ([address] Hyperlipidemia, Irritable bowel syndrome, Meningitis, Neutrophilic leukocytosis, Nicotine dependence, PONV (postoperative nausea and vomiting), Primary osteoarthritis of left knee, S/P cone biopsy of cervix, Scoliosis, Seizures ([address]  (ventriculoperitoneal) shunt status, and Wears glasses. Past Surgical History  Reviewed has a past surgical history that includes Appendectomy (); Colonoscopy (); Colposcopy ();  section (); csf shunt; Cystoscopy; Knee arthroscopy (Left, 2015); Cholecystectomy; other surgical history (10/19/2016); Ventriculoperitoneal shunt; Hysterectomy, total abdominal (2016); Lithotripsy (Bilateral, 12/10/2019); hernia repair (Bilateral, ); hernia repair (); Hemorrhoid surgery; Abdomen surgery (N/A, 2021); and ventral hernia repair (N/A, 2022). Medications  Prior to Admission medications    Medication Sig Start Date End Date Taking? Authorizing Provider   lisdexamfetamine (VYVANSE) 50 MG capsule Take 50 mg by mouth every morning. Yes Historical Provider, MD   oxyCODONE (ROXICODONE) 5 MG immediate release tablet Take 5 mg by mouth every 6 hours as needed for Pain.    Yes Historical Provider, MD   hydrOXYzine HCl (ATARAX) 25 MG tablet Take 25 mg by mouth 3 times daily as needed for Itching   Yes Historical reports that she does not drink alcohol and does not use drugs. EDUCATION  Patient educated about their illness/diagnosis, stated above, and all questions answered. We discussed the importance of nutrition, medications they are taking, and healthy lifestyle. Review of Systems:  General Admits to having fevers at home  HEENT Denies any diplopia, tinnitus or vertigo  Resp Denies any shortness of breath, cough or wheezing  Cardiac Denies any chest pain, palpitations, claudication or edema  GI Denies any melena, hematochezia, hematemesis or pyrosis   Denies any frequency, urgency, hesitancy or incontinence  Heme Denies bruising or bleeding easily  Neuro Denies any focal motor or sensory deficits  OBJECTIVE:   VITALS:  height is 4' 11\" (1.499 m) and weight is 166 lb 14.2 oz (75.7 kg). Her oral temperature is 97.5 °F (36.4 °C). Her blood pressure is 97/66 and her pulse is 66. Her respiration is 16 and oxygen saturation is 96%. CONSTITUTIONAL: Alert and oriented times 3, no acute distress and cooperative to examination with proper mood and affect. SKIN: Skin color, texture, turgor ok. Incision sites ok. Healing well. No signs of infection noted. No rashes or lesions. LYMPH: no cervical nodes, no inguinal nodes  HEENT: Head is normocephalic, atraumatic. EOMI, PERRLA. NECK: Supple, symmetrical, trachea midline, no adenopathy, thyroid symmetric, not enlarged and no tenderness, skin normal.  CHEST/LUNGS: chest symmetric with normal A/P diameter, normal respiratory rate and rhythm, lungs clear to auscultation without wheezes, rales or rhonchi. No accessory muscle use. Scars None   CARDIOVASCULAR: Heart sounds are normal.  Regular rate and rhythm without murmur, gallop or rub. Normal S1 and S2. . Carotid and femoral pulses 2+/4 and equal bilaterally. ABDOMEN: Normal shape. Tenderness: Lower mid abdomen at surgical incision site.  No erythema, drainage, fluctuance, and a small area of induration that would be expected postoperatively was noted  RECTAL: deferred, not clinically indicated  NEUROLOGIC: There are no focalizing motor or sensory deficits. CN II-XII are grossly intact. [place] EXTREMITIES: no cyanosis, no clubbing, and no edema.   LABS:     Recent Labs     10/09/22  1738 10/10/22  0626 10/10/22  0817 10/10/22  0900   WBC 11.4*  --  7.1  --    HGB 14.8  --  12.4  --    HCT 44.9  --  36.9  --      --  248  --     137  --   --    K 3.9 3.6  --   --     103  --   --    CO2 24 21  --   --    BUN 14 16  --   --    CREATININE 0.8 0.8  --   --    MG  --  2.00  --   --    PHOS  --  4.0  --   --    CALCIUM 9.6 8.6  --   --    AST 8*  --   --   --    ALT 23  --   --   --    BILITOT 0.3  --   --   --    NITRU  --   --   --  Negative   COLORU  --   --   --  Yellow     Recent Labs     10/09/22  1738 10/10/22  0626   ALKPHOS 142*  --    ALT 23  --    AST 8*  --    BILITOT 0.3  --    LABALBU 4.5 3.4     CBC:   Lab Results   Component Value Date/Time    WBC 7.1 10/10/2022 08:17 AM    RBC 4.37 10/10/2022 08:17 AM    HGB 12.4 10/10/2022 08:17 AM    HCT 36.9 10/10/2022 08:17 AM    MCV 84.4 10/10/2022 08:17 AM    MCH 28.5 10/10/2022 08:17 AM    MCHC 33.7 10/10/2022 08:17 AM    RDW 16.3 10/10/2022 08:17 AM     10/10/2022 08:17 AM    MPV 7.5 10/10/2022 08:17 AM     CMP:    Lab Results   Component Value Date/Time     10/10/2022 06:26 AM    K 3.6 10/10/2022 06:26 AM    K 3.1 10/06/2022 04:50 AM     10/10/2022 06:26 AM    CO2 21 10/10/2022 06:26 AM    BUN 16 10/10/2022 06:26 AM    CREATININE 0.8 10/10/2022 06:26 AM    GFRAA >60 10/10/2022 06:26 AM    GFRAA >60 10/08/2011 10:35 PM    AGRATIO 1.4 10/09/2022 05:38 PM    LABGLOM >60 10/10/2022 06:26 AM    GLUCOSE 143 10/10/2022 06:26 AM    PROT 7.7 10/09/2022 05:38 PM    PROT 7.3 08/14/2011 07:30 PM    LABALBU 3.4 10/10/2022 06:26 AM    CALCIUM 8.6 10/10/2022 06:26 AM    BILITOT 0.3 10/09/2022 05:38 PM    ALKPHOS 142 10/09/2022 05:38 PM    AST 8 10/09/2022 05:38 PM ALT 23 10/09/2022 05:38 PM     Urine Culture:  No components found for: CURINE  Blood Culture:  No components found for: CBLOOD, CFUNGUSBL  RADIOLOGY:     CT scan abd/pelvis (10/9/22):   1. In the anterior pelvic wall an area of skin thickening with ulceration as   well as subcutaneous inflammatory changes. No evidence of abscess. Severe   cellulitis is likely. 2. No evidence of significant hernia. 3. Right nephrolithiasis but no acute obstructive uropathy or pyelonephritis. Thank you for the interesting evaluation. Further recommendations to follow. Nanette Velasco Man[facility]  General and Vascular Surgery [phone]  Electronically signed by Pat Montejo PA-C on 10/10/2022 at 12:36 PM    As above  Admitted for abdominal wall cellulitis on CT   Recent surgery to remove a subcutaneous mass   No erythema on exam   CT findings may just represent postop  inflammation   Defer to medicine and ID regarding ongoing antibiotics   No surgical intervention    Electronically signed by Radha Blevins MD on 10/10/2022 at 2:13 PM

## 2022-10-10 NOTE — PROGRESS NOTES
Hospitalist Progress Note  10/10/2022 10:48 AM    PCP: Silvana Loza CNP    [id number]     Date of Admission: 10/9/2022                                                                                                                     HOSPITAL COURSE    Patient demographics:  The patient  Debbie Moeller is a 38 y.o. female     Significant past medical history:   Patient Active Problem List   Diagnosis    Attention deficit hyperactivity disorder (ADHD)     (ventriculoperitoneal) shunt status    Scoliosis    Prediabetes    Tobacco smoker    Onychomycosis    Congenital hydrocephalus ([place])    Sepsis without acute organ dysfunction (HCC)    Ureteritis    Acute cystitis without hematuria    History of brain shunt    Mood disorder (HCC)    Numbness and tingling in left hand    Diverticulosis    Colitis    Infection due to extended-spectrum beta-lactamase-producing Escherichia coli    Kidney stone    Intermittent small bowel obstruction due to adhesions (HCC)    Abdominal wall mass    Cyst and pseudocyst of pancreas    Abdominal pain    Nausea and vomiting    COVID    Abdominal wall cellulitis    DM2 (diabetes mellitus, type 2) (HCC)    HTN (hypertension)    Cellulitis         Presenting symptoms:  Abdominal pain, fever, chills, fatigue    Diagnostic workup:      CONSULTS DURING ADMISSION :   IP CONSULT TO GENERAL SURGERY  IP CONSULT TO INFECTIOUS DISEASES      Patient was diagnosed with:  Abdominal wall cellulitis  DM2  HTN      Treatment while inpatient:  38years old female with medical history significant for laparoscopic lysis of adhesions and removal of subcutaneous abdominal wall mass on 9/14/2022  Patient presented to the emergency room with lower abdominal pain. Patient reported 3 to 4 days of worsening pain in the surgical incision site. No significant erythema no drainage only small area of induration that is expected.   CT scan of the abdomen and pelvis did not show any evidence of abscess. General surgery was consulted and they do not recommend any further intervention. Infectious disease was consulted and they recommended to continue with cefepime and patient will be discharged on oral medication.                                                             ----------------------------------------------------------      SUBJECTIVE COMPLAINTS- Abdominal pain,    Diet: ADULT DIET;  Regular      OBJECTIVE:   Patient Active Problem List   Diagnosis    Attention deficit hyperactivity disorder (ADHD)     (ventriculoperitoneal) shunt status    Scoliosis    Prediabetes    Tobacco smoker    Onychomycosis    Congenital hydrocephalus (HCC)    Sepsis without acute organ dysfunction ([facility])    Ureteritis    Acute cystitis without hematuria    History of brain shunt    Mood disorder ([facility])    Numbness and tingling in left hand    Diverticulosis    Colitis    Infection due to extended-spectrum beta-lactamase-producing Escherichia coli    Kidney stone    Intermittent small bowel obstruction due to adhesions ([facility])    Abdominal wall mass    Cyst and pseudocyst of pancreas    Abdominal pain    Nausea and vomiting    COVID    Abdominal wall cellulitis    DM2 (diabetes mellitus, type 2) ([facility])    HTN (hypertension)    Cellulitis       Allergies  Bee venom, Bentyl [dicyclomine hcl], Dicyclomine, Ketorolac tromethamine, Levofloxacin, Maitake, Shiitake mushroom, Sulfa antibiotics, Vancomycin, Zosyn [piperacillin sod-tazobactam so], Adhesive tape, Oxycodone-acetaminophen, Sulfacetamide, Acetaminophen, Butalbital-apap-caff-cod, Ceftaroline, Daptomycin, Fosfomycin tromethamine, Haldol [haloperidol], Ketamine, Methocarbamol, Morphine, Nitrofurantoin, Prochlorperazine, Reglan [metoclopramide], Silicone, and Tazobactam    Medications    Scheduled Meds:   linezolid  600 mg IntraVENous Q12H    cefepime  2,000 mg IntraVENous Q12H    atorvastatin  20 mg Oral Daily    gabapentin  400 mg Oral TID    sodium chloride flush  5-40 mL IntraVENous 2 times per day    enoxaparin  40 mg SubCUTAneous Daily     Continuous Infusions:  PRN Meds:  sodium chloride flush, polyethylene glycol, ondansetron, promethazine, HYDROmorphone **OR** HYDROmorphone    Vitals   Vitals /wt Patient Vitals for the past 8 hrs:   BP Temp Temp src Pulse Resp SpO2 Weight   10/10/22 0835 -- -- -- -- -- 96 % --   10/10/22 0819 97/66 97.5 °F (36.4 °C) Oral 66 16 96 % --   10/10/22 0645 -- -- -- -- -- -- 166 lb 14.2 oz (75.7 kg)        72HR INTAKE/OUTPUT:    Intake/Output Summary (Last 24 hours) at 10/10/2022 1048  Last data filed at 10/10/2022 0610  Gross per 24 hour   Intake 339.58 ml   Output --   Net 339.58 ml       Exam:    Gen:   Alert and oriented ×3   Eyes: PERRL. No sclera icterus. No conjunctival injection. ENT: No discharge. Pharynx clear. External appearance of ears and nose normal.  Neck: Trachea midline. No obvious mass. Resp: No accessory muscle use. No crackles. No wheezes. No rhonchi. CV: Regular rate. Regular rhythm. No murmur or rub. No edema. GI: Abdomen is soft nontender. Small laparoscopic incision is noted that is healing well  Skin: Warm, dry, normal texture and turgor. Lymph: No cervical LAD. No supraclavicular LAD. M/S: / Ext. No cyanosis. No clubbing. No joint deformity. Neuro: CN 2-12 are intact,  no neurologic deficits noted. PT/INR: No results for input(s): PROTIME, INR in the last 72 hours. APTT: No results for input(s): APTT in the last 72 hours. CBC:   Recent Labs     10/09/22  1738 10/10/22  0817   WBC 11.4* 7.1   HGB 14.8 12.4   HCT 44.9 36.9   MCV 85.6 84.4    248       BMP:   Recent Labs     10/09/22  1738 10/10/22  0626    137   K 3.9 3.6    103   CO2 24 21   PHOS  --  4.0   BUN 14 16   CREATININE 0.8 0.8       LIVER PROFILE:   Recent Labs     10/09/22  1738   ALKPHOS 142*   AST 8*   ALT 23   BILITOT 0.3     No results for input(s): AMYLASE in the last 72 hours.   No results for input(s): LIPASE in the last 72 hours. UA:No results for input(s): NITRITE, LABCAST, WBCUA, RBCUA, MUCUS in the last 72 hours. TROPONIN: No results for input(s): Nanette Bigness in the last 72 hours. Lab Results   Component Value Date/Time    URRFLXCULT Not Indicated 10/10/2022 09:00 AM       No results for input(s): TSHREFLEX in the last 72 hours. No components found for: [id number]  POC GLUCOSE:    Recent Labs     10/10/22  0800   POCGLU 115*     No results for input(s): LABA1C in the last 72 hours. Lab Results   Component Value Date/Time    LABA1C 6.3 08/24/2022 06:22 AM         ASSESSMENT AND PLAN  Abdominal wall cellulitis  CT scan of the abdomen and pelvis does not show any evidence of abscess  Continue IV antibiotics  Patient can be discharged on few days of oral antibiotics    DM2  Sliding scale insulin    HTN  Continue on home medication         Code Status: Full Code        Dispo - cc        The patient and / or the family were informed of the results of any tests, a time was given to answer questions, a plan was proposed and they agreed with plan. Lauro Connors MD    This note was transcribed using [id number]. Please disregard any translational errors.

## 2022-10-10 NOTE — CARE COORDINATION
Denies any needs, plans to dc home with family, will have a ride. Case Management Assessment  Initial Evaluation    Date/Time of Evaluation: 10/10/2022 11:31 AM  Assessment Completed by: Luly Loo RN    If patient is discharged prior to next notation, then this note serves as note for discharge by case management. Patient Name: Shawn Khan                   YOB: 1980  Diagnosis: Cellulitis [L03.90]  Abdominal wall cellulitis [L60.073]                   Date / Time: 10/9/2022  4:06 PM    Patient Admission Status: Inpatient   Readmission Risk (Low < 19, Mod (19-27), High > 27): Readmission Risk Score: 22.5    Current PCP: FREDRICK Montgomery CNP  PCP verified by CM? (P) Yes    Chart Reviewed: Yes      History Provided by: (P) Patient  Patient Orientation: (P) Alert and Oriented, Person, Place, Situation, Self    Patient Cognition: (P) Alert    Hospitalization in the last 30 days (Readmission):  Yes    If yes, Readmission Assessment in  Navigator will be completed. Advance Directives:      Code Status: Full Code   Patient's Primary Decision Maker is: Patient Declined (Legal Next of Kin Remains as Decision Maker)      Discharge Planning:    Patient lives with: (P) Family Members Type of Home: (P) House  Primary Care Giver: (P) Self  Patient Support Systems include: (P) Family Members   Current Financial resources: (P) Medicaid  Current community resources:    Current services prior to admission: (P) None            Current DME:              Type of Home Care services:  (P) None    ADLS  Prior functional level: (P) Independent in ADLs/IADLs  Current functional level: (P) Independent in ADLs/IADLs    PT AM-PAC:   /24  OT AM-PAC:   /24    Family can provide assistance at DC: (P) Yes  Would you like Case Management to discuss the discharge plan with any other family members/significant others, and if so, who?     Plans to Return to Present Housing: (P) Yes  Other Identified Issues/Barriers to RETURNING to current housing: NA  Potential Assistance needed at discharge: (P) N/A            Potential DME:    Patient expects to discharge to: (P) [facility] for transportation at discharge:      Financial    Payor: Margaret Bedoya / Plan: Ronak Cnuha / Product Type: *No Product type* /     Does insurance require precert for SNF: NA    Potential assistance Purchasing Medications: (P) No  Meds-to-Beds request:        [id number]. Carla Baugh [phone] - F [phone]  [address] [place] [address]  Phone: [phone] Fax: [phone]      Notes:    Factors facilitating achievement of predicted outcomes: Family support    Barriers to discharge: na    Additional Case Management Notes: denies any needs, plans to dc home, will have a ride    The Plan for Transition of Care is related to the following treatment goals of Cellulitis [L03.90]  Abdominal wall cellulitis [S04.161]    IF APPLICABLE: The Patient and/or patient representative Petrona and her family were provided with a choice of provider and agrees with the discharge plan. Freedom of choice list with basic dialogue that supports the patient's individualized plan of care/goals and shares the quality data associated with the providers was provided to:     Patient Representative Name:       The Patient and/or Patient Representative Agree with the Discharge Plan?       Beny Coon RN, BSN,   [phone]  Electronically signed by Beny Coon RN on 10/10/2022 at 11:32 AM

## 2022-10-10 NOTE — CARE COORDINATION
10/10/22 1127   Readmission Assessment   Number of Days since last admission? 1-7 days   Previous Disposition Home with Family   Who is being Interviewed Patient   What was the patient's/caregiver's perception as to why they think they needed to return back to the hospital?   (my stomach wound is now infected)   Did you visit your Primary Care Physician after you left the hospital, before you returned this time? No   Why weren't you able to visit your PCP? Did not have an appointment   Did you see a specialist, such as Cardiac, Pulmonary, Orthopedic Physician, etc. after you left the hospital? No   Who advised the patient to return to the hospital? Self-referral   Does the patient report anything that got in the way of taking their medications?  No     Gm Velez RN, BSN,   [phone]  Electronically signed by Gm Velez RN on 10/10/2022 at 11:28 AM

## 2022-10-11 VITALS
OXYGEN SATURATION: 96 % | HEART RATE: 70 BPM | DIASTOLIC BLOOD PRESSURE: 59 MMHG | SYSTOLIC BLOOD PRESSURE: 99 MMHG | WEIGHT: 158.29 LBS | HEIGHT: 59 IN | RESPIRATION RATE: 18 BRPM | TEMPERATURE: 97.3 F | BODY MASS INDEX: 31.91 KG/M2

## 2022-10-11 LAB
ALBUMIN SERPL-MCNC: 3.9 G/DL (ref 3.4–5)
ANION GAP SERPL CALCULATED.3IONS-SCNC: 9 MMOL/L (ref 3–16)
BASOPHILS ABSOLUTE: 0 K/UL (ref 0–0.2)
BASOPHILS RELATIVE PERCENT: 0.8 %
BUN BLDV-MCNC: 12 MG/DL (ref 7–20)
CALCIUM SERPL-MCNC: 8.7 MG/DL (ref 8.3–10.6)
CHLORIDE BLD-SCNC: 104 MMOL/L (ref 99–110)
CO2: 25 MMOL/L (ref 21–32)
CREAT SERPL-MCNC: 0.5 MG/DL (ref 0.6–1.1)
EOSINOPHILS ABSOLUTE: 0.4 K/UL (ref 0–0.6)
EOSINOPHILS RELATIVE PERCENT: 6.1 %
GFR AFRICAN AMERICAN: >60
GFR NON-AFRICAN AMERICAN: >60
GLUCOSE BLD-MCNC: 195 MG/DL (ref 70–99)
HCT VFR BLD CALC: 36 % (ref 36–48)
HEMOGLOBIN: 12.2 G/DL (ref 12–16)
LYMPHOCYTES ABSOLUTE: 1.7 K/UL (ref 1–5.1)
LYMPHOCYTES RELATIVE PERCENT: 27.9 %
MAGNESIUM: 2.1 MG/DL (ref 1.8–2.4)
MCH RBC QN AUTO: 28.4 PG (ref 26–34)
MCHC RBC AUTO-ENTMCNC: 33.7 G/DL (ref 31–36)
MCV RBC AUTO: 84.2 FL (ref 80–100)
MONOCYTES ABSOLUTE: 0.4 K/UL (ref 0–1.3)
MONOCYTES RELATIVE PERCENT: 7 %
NEUTROPHILS ABSOLUTE: 3.5 K/UL (ref 1.7–7.7)
NEUTROPHILS RELATIVE PERCENT: 58.2 %
PDW BLD-RTO: 16 % (ref 12.4–15.4)
PHOSPHORUS: 2.8 MG/DL (ref 2.5–4.9)
PLATELET # BLD: 237 K/UL (ref 135–450)
PMV BLD AUTO: 7.2 FL (ref 5–10.5)
POTASSIUM SERPL-SCNC: 3.5 MMOL/L (ref 3.5–5.1)
RBC # BLD: 4.28 M/UL (ref 4–5.2)
SODIUM BLD-SCNC: 138 MMOL/L (ref 136–145)
WBC # BLD: 6 K/UL (ref 4–11)

## 2022-10-11 PROCEDURE — 6370000000 HC RX 637 (ALT 250 FOR IP): Performed by: NURSE PRACTITIONER

## 2022-10-11 PROCEDURE — 6370000000 HC RX 637 (ALT 250 FOR IP): Performed by: INTERNAL MEDICINE

## 2022-10-11 PROCEDURE — 80069 RENAL FUNCTION PANEL: CPT

## 2022-10-11 PROCEDURE — 85025 COMPLETE CBC W/AUTO DIFF WBC: CPT

## 2022-10-11 PROCEDURE — 83735 ASSAY OF MAGNESIUM: CPT

## 2022-10-11 PROCEDURE — 36415 COLL VENOUS BLD VENIPUNCTURE: CPT

## 2022-10-11 PROCEDURE — 94760 N-INVAS EAR/PLS OXIMETRY 1: CPT

## 2022-10-11 PROCEDURE — 6370000000 HC RX 637 (ALT 250 FOR IP): Performed by: HOSPITALIST

## 2022-10-11 PROCEDURE — 6360000002 HC RX W HCPCS: Performed by: STUDENT IN AN ORGANIZED HEALTH CARE EDUCATION/TRAINING PROGRAM

## 2022-10-11 RX ORDER — DOXYCYCLINE HYCLATE 100 MG
100 TABLET ORAL EVERY 12 HOURS SCHEDULED
Qty: 6 TABLET | Refills: 0 | Status: SHIPPED | OUTPATIENT
Start: 2022-10-11 | End: 2022-10-14

## 2022-10-11 RX ORDER — OXYCODONE HYDROCHLORIDE 5 MG/1
5 TABLET ORAL EVERY 6 HOURS PRN
Status: COMPLETED | OUTPATIENT
Start: 2022-10-11 | End: 2022-10-11

## 2022-10-11 RX ORDER — OXYCODONE HYDROCHLORIDE 5 MG/1
5 TABLET ORAL EVERY 4 HOURS PRN
Status: DISCONTINUED | OUTPATIENT
Start: 2022-10-11 | End: 2022-10-11 | Stop reason: HOSPADM

## 2022-10-11 RX ADMIN — OXYCODONE 5 MG: 5 TABLET ORAL at 09:44

## 2022-10-11 RX ADMIN — DOXYCYCLINE HYCLATE 100 MG: 100 TABLET, COATED ORAL at 09:44

## 2022-10-11 RX ADMIN — OXYCODONE 5 MG: 5 TABLET ORAL at 00:11

## 2022-10-11 RX ADMIN — Medication 6.25 MG: at 02:01

## 2022-10-11 RX ADMIN — Medication 6.25 MG: at 09:43

## 2022-10-11 ASSESSMENT — PAIN DESCRIPTION - DESCRIPTORS
DESCRIPTORS: SHARP
DESCRIPTORS: ACHING

## 2022-10-11 ASSESSMENT — PAIN SCALES - GENERAL
PAINLEVEL_OUTOF10: 5
PAINLEVEL_OUTOF10: 9
PAINLEVEL_OUTOF10: 5
PAINLEVEL_OUTOF10: 5

## 2022-10-11 ASSESSMENT — PAIN DESCRIPTION - LOCATION
LOCATION: ABDOMEN

## 2022-10-11 ASSESSMENT — PAIN DESCRIPTION - PAIN TYPE
TYPE: ACUTE PAIN

## 2022-10-11 ASSESSMENT — PAIN DESCRIPTION - FREQUENCY
FREQUENCY: CONTINUOUS

## 2022-10-11 ASSESSMENT — PAIN DESCRIPTION - ONSET
ONSET: ON-GOING

## 2022-10-11 ASSESSMENT — PAIN DESCRIPTION - ORIENTATION
ORIENTATION: MID;LOWER
ORIENTATION: ANTERIOR;MID
ORIENTATION: ANTERIOR;MID

## 2022-10-11 NOTE — PLAN OF CARE
Problem: Discharge Planning  Goal: Discharge to home or other facility with appropriate resources  Outcome: Progressing  Flowsheets (Taken 10/10/2022 2010)  Discharge to home or other facility with appropriate resources:   Identify barriers to discharge with patient and caregiver   Arrange for needed discharge resources and transportation as appropriate   Identify discharge learning needs (meds, wound care, etc)     Problem: Pain  Goal: Verbalizes/displays adequate comfort level or baseline comfort level  Outcome: Progressing     Problem: Safety - Adult  Goal: Free from fall injury  Outcome: Progressing     Problem: Chronic Conditions and Co-morbidities  Goal: Patient's chronic conditions and co-morbidity symptoms are monitored and maintained or improved  Outcome: Progressing  Flowsheets (Taken 10/10/2022 2010)  Care Plan - Patient's Chronic Conditions and Co-Morbidity Symptoms are Monitored and Maintained or Improved:   Monitor and assess patient's chronic conditions and comorbid symptoms for stability, deterioration, or improvement   Collaborate with multidisciplinary team to address chronic and comorbid conditions and prevent exacerbation or deterioration   Update acute care plan with appropriate goals if chronic or comorbid symptoms are exacerbated and prevent overall improvement and discharge

## 2022-10-11 NOTE — CARE COORDINATION
CASE MANAGEMENT DISCHARGE SUMMARY:    DISCHARGE DATE: 10/11/22    DISCHARGED TO HOME     TRANSPORTATION: cousin    Denies needs.     Electronically signed by Slade Reagan RN on 10/11/2022 at 11:34 AM

## 2022-10-11 NOTE — PROGRESS NOTES
A secure message was sent to the Hospitalist regarding pain meds for this patient . The Dilaudid was discontinued without another medication to give for pain. Amaya Donahue Awaiting response.

## 2022-10-11 NOTE — PROGRESS NOTES
Pain medication clarified after patient showed this RN her My Chart. Takes Oxycodone 5 mg . NP notified. Medication confirmed . Order given for Oxycodone 5 mg every 6 hrs PRN . Order valid for 24 hrs . A dose was given to patient.

## 2022-10-11 NOTE — PROGRESS NOTES
A new order was given for a one time dose of Advil 400 mg P.O. Informed the patient of this. Is refusing to take this . States she takes Percocet from her pain doctor at home. Message sent to NP regarding this information . Awaiting response.

## 2022-10-13 LAB
BLOOD CULTURE, ROUTINE: NORMAL
CULTURE, BLOOD 2: NORMAL

## 2022-10-17 NOTE — DISCHARGE SUMMARY
Hospital Medicine Discharge Summary      Patient ID: Kathy Taylor [id number]     Patient's PCP: FREDRICK Rodriguez - CNP    Admit Date: 10/9/2022     Discharge Date: 10/11/2022      Admitting Physician: Angel Molina DO    Discharge Physician: Mela Mobley MD     Discharge Diagnoses: Active Hospital Problems    Diagnosis Date Noted    History of extended-spectrum beta-lactamase producing Escherichia coli infection [Z86.19] 10/10/2022     Priority: Medium    H/O laparoscopy [Z98.890] 10/10/2022     Priority: Medium    Allergy to multiple antibiotics [Z88.1] 10/10/2022     Priority: Medium    Abdominal wall cellulitis [L66.629] 10/09/2022     Priority: Medium    DM2 (diabetes mellitus, type 2) ([facility] [address] [E11.9] 10/09/2022     Priority: Medium    HTN (hypertension) [I10] 10/09/2022     Priority: Medium    Cellulitis [L03.90] 10/09/2022     Priority: Medium    Abdominal pain in female [R10.9] 09/25/2022     Priority: Medium         The patient was seen and examined on the day of discharge and this discharge summary is in conjunction with any daily progress note from day of discharge.     HOSPITAL COURSE    Patient demographics:  The patient  Dimitri Vines is a 38 y.o. female      Significant past medical history:       Patient Active Problem List   Diagnosis    Attention deficit hyperactivity disorder (ADHD)     (ventriculoperitoneal) shunt status    Scoliosis    Prediabetes    Tobacco smoker    Onychomycosis    Congenital hydrocephalus ([facility] [address]    Sepsis without acute organ dysfunction (HCC)    Ureteritis    Acute cystitis without hematuria    History of brain shunt    Mood disorder (HCC)    Numbness and tingling in left hand    Diverticulosis    Colitis    Infection due to extended-spectrum beta-lactamase-producing Escherichia coli    Kidney stone    Intermittent small bowel obstruction due to adhesions (HCC)    Abdominal wall mass    Cyst and pseudocyst of pancreas    Abdominal pain in female Nausea and vomiting    COVID    Abdominal wall cellulitis    DM2 (diabetes mellitus, type 2) (HCC)    HTN (hypertension)    Cellulitis    History of extended-spectrum beta-lactamase producing Escherichia coli infection    H/O laparoscopy    Allergy to multiple antibiotics            Presenting symptoms:  Abdominal pain, fever, chills, fatigue     Diagnostic workup:   CT ABDOMEN PELVIS W IV CONTRAST       CONSULTS DURING ADMISSION :   IP CONSULT TO GENERAL SURGERY  IP CONSULT TO INFECTIOUS DISEASES        Patient was diagnosed with:  Abdominal wall cellulitis  DM2  HTN        Treatment while inpatient:  38years old female with medical history significant for laparoscopic lysis of adhesions and removal of subcutaneous abdominal wall mass on 9/14/2022  Patient presented to the emergency room with lower abdominal pain. Patient reported 3 to 4 days of worsening pain in the surgical incision site. No significant erythema no drainage only small area of induration that is expected. CT scan of the abdomen and pelvis did not show any evidence of abscess. General surgery was consulted and they do not recommend any further intervention. Infectious disease was consulted and they recommended to continue with cefepime while inpatient and patient can be discharged on oral doxycycline. Discharge Condition:  stable     Discharged to:  Home     Activity:   as tolerated: Follow Up: Follow-up with PCP in 1-2 weeks      Labs:  For convenience and continuity at follow-up the following most recent labs are provided:      CBC:   Lab Results   Component Value Date/Time    WBC 6.0 10/11/2022 06:01 AM    HGB 12.2 10/11/2022 06:01 AM    HCT 36.0 10/11/2022 06:01 AM     10/11/2022 06:01 AM       RENAL:   Lab Results   Component Value Date/Time     10/11/2022 06:01 AM    K 3.5 10/11/2022 06:01 AM    K 3.1 10/06/2022 04:50 AM     10/11/2022 06:01 AM    CO2 25 10/11/2022 06:01 AM    BUN 12 10/11/2022 06:01 AM    CREATININE 0.5 10/11/2022 06:01 AM           Discharge Medications:      Medication List        START taking these medications      albuterol sulfate  (90 Base) MCG/ACT inhaler  Commonly known as: Ventolin HFA  Inhale 2 puffs into the lungs 4 times daily as needed for Wheezing            CONTINUE taking these medications      gabapentin 400 MG capsule  Commonly known as: NEURONTIN     hydrOXYzine HCl 25 MG tablet  Commonly known as: ATARAX     oxyCODONE 5 MG immediate release tablet  Commonly known as: ROXICODONE     * Vyvanse 50 MG capsule  Generic drug: lisdexamfetamine     * Vyvanse 50 MG capsule  Generic drug: lisdexamfetamine  Take 1 capsule by mouth every morning for 30 days. Take 50 mg by mouth every morning. * This list has 2 medication(s) that are the same as other medications prescribed for you. Read the directions carefully, and ask your doctor or other care provider to review them with you. ASK your doctor about these medications      doxycycline hyclate 100 MG tablet  Commonly known as: VIBRA-TABS  Take 1 tablet by mouth every 12 hours for 3 days  Ask about: Should I take this medication? Where to Get Your Medications        These medications were sent to [address] [place] [address]. [place] [address]      Phone: [phone]   doxycycline hyclate 100 MG tablet            Time Spent on discharge is more than 30 min in the examination, evaluation, counseling and review of medications and discharge plan. Signed:  Diane Washington MD   10/17/2022      Thank you FREDRICK Callahan CNP for the opportunity to be involved in this patient's care. If you have any questions or concerns please feel free to contact me at [phone]. This note was transcribed using [id number]. Please disregard any translational errors.

## 2022-10-18 ENCOUNTER — TELEPHONE (OUTPATIENT)
Dept: PRIMARY CARE CLINIC | Age: 42
End: 2022-10-18

## 2022-10-18 DIAGNOSIS — F90.9 ATTENTION DEFICIT HYPERACTIVITY DISORDER (ADHD), UNSPECIFIED ADHD TYPE: Primary | ICD-10-CM

## 2022-10-22 ENCOUNTER — APPOINTMENT (OUTPATIENT)
Dept: CT IMAGING | Age: 42
End: 2022-10-22
Payer: COMMERCIAL

## 2022-10-22 ENCOUNTER — HOSPITAL ENCOUNTER (EMERGENCY)
Age: 42
Discharge: HOME | End: 2022-10-22
Payer: COMMERCIAL

## 2022-10-22 ENCOUNTER — APPOINTMENT (OUTPATIENT)
Dept: GENERAL RADIOLOGY | Age: 42
End: 2022-10-22
Payer: COMMERCIAL

## 2022-10-22 VITALS
HEIGHT: 59 IN | WEIGHT: 147.27 LBS | HEART RATE: 87 BPM | TEMPERATURE: 98.6 F | RESPIRATION RATE: 14 BRPM | DIASTOLIC BLOOD PRESSURE: 74 MMHG | BODY MASS INDEX: 29.69 KG/M2 | OXYGEN SATURATION: 100 % | SYSTOLIC BLOOD PRESSURE: 101 MMHG

## 2022-10-22 DIAGNOSIS — R07.9 CHEST PAIN, UNSPECIFIED TYPE: Primary | ICD-10-CM

## 2022-10-22 DIAGNOSIS — R11.2 NAUSEA AND VOMITING, UNSPECIFIED VOMITING TYPE: ICD-10-CM

## 2022-10-22 DIAGNOSIS — R10.9 ABDOMINAL PAIN, UNSPECIFIED ABDOMINAL LOCATION: ICD-10-CM

## 2022-10-22 LAB
A/G RATIO: 1.5 (ref 1.1–2.2)
ALBUMIN SERPL-MCNC: 4.7 G/DL (ref 3.4–5)
ALP BLD-CCNC: 144 U/L (ref 40–129)
ALT SERPL-CCNC: 22 U/L (ref 10–40)
ANION GAP SERPL CALCULATED.3IONS-SCNC: 15 MMOL/L (ref 3–16)
AST SERPL-CCNC: 16 U/L (ref 15–37)
BASOPHILS ABSOLUTE: 0.1 K/UL (ref 0–0.2)
BASOPHILS RELATIVE PERCENT: 0.9 %
BILIRUB SERPL-MCNC: 0.4 MG/DL (ref 0–1)
BUN BLDV-MCNC: 8 MG/DL (ref 7–20)
CALCIUM SERPL-MCNC: 9.8 MG/DL (ref 8.3–10.6)
CHLORIDE BLD-SCNC: 102 MMOL/L (ref 99–110)
CO2: 21 MMOL/L (ref 21–32)
CREAT SERPL-MCNC: 0.7 MG/DL (ref 0.6–1.1)
EOSINOPHILS ABSOLUTE: 0.2 K/UL (ref 0–0.6)
EOSINOPHILS RELATIVE PERCENT: 1.5 %
GFR SERPL CREATININE-BSD FRML MDRD: >60 ML/MIN/{1.73_M2}
GLUCOSE BLD-MCNC: 119 MG/DL (ref 70–99)
HCG QUALITATIVE: NEGATIVE
HCT VFR BLD CALC: 43.3 % (ref 36–48)
HEMOGLOBIN: 14.8 G/DL (ref 12–16)
LIPASE: 23 U/L (ref 13–60)
LYMPHOCYTES ABSOLUTE: 2 K/UL (ref 1–5.1)
LYMPHOCYTES RELATIVE PERCENT: 19.6 %
MCH RBC QN AUTO: 28.6 PG (ref 26–34)
MCHC RBC AUTO-ENTMCNC: 34.2 G/DL (ref 31–36)
MCV RBC AUTO: 83.5 FL (ref 80–100)
MONOCYTES ABSOLUTE: 0.5 K/UL (ref 0–1.3)
MONOCYTES RELATIVE PERCENT: 4.5 %
NEUTROPHILS ABSOLUTE: 7.6 K/UL (ref 1.7–7.7)
NEUTROPHILS RELATIVE PERCENT: 73.5 %
PDW BLD-RTO: 15.9 % (ref 12.4–15.4)
PLATELET # BLD: 306 K/UL (ref 135–450)
PMV BLD AUTO: 7.3 FL (ref 5–10.5)
POTASSIUM SERPL-SCNC: 3.6 MMOL/L (ref 3.5–5.1)
RBC # BLD: 5.18 M/UL (ref 4–5.2)
SODIUM BLD-SCNC: 138 MMOL/L (ref 136–145)
TOTAL PROTEIN: 7.8 G/DL (ref 6.4–8.2)
TROPONIN: <0.01 NG/ML
WBC # BLD: 10.3 K/UL (ref 4–11)

## 2022-10-22 PROCEDURE — 71260 CT THORAX DX C+: CPT | Performed by: PHYSICIAN ASSISTANT

## 2022-10-22 PROCEDURE — 71046 X-RAY EXAM CHEST 2 VIEWS: CPT

## 2022-10-22 PROCEDURE — 96374 THER/PROPH/DIAG INJ IV PUSH: CPT

## 2022-10-22 PROCEDURE — 96375 TX/PRO/DX INJ NEW DRUG ADDON: CPT

## 2022-10-22 PROCEDURE — 80053 COMPREHEN METABOLIC PANEL: CPT

## 2022-10-22 PROCEDURE — 74177 CT ABD & PELVIS W/CONTRAST: CPT

## 2022-10-22 PROCEDURE — 6360000004 HC RX CONTRAST MEDICATION: Performed by: PHYSICIAN ASSISTANT

## 2022-10-22 PROCEDURE — 83690 ASSAY OF LIPASE: CPT

## 2022-10-22 PROCEDURE — 85025 COMPLETE CBC W/AUTO DIFF WBC: CPT

## 2022-10-22 PROCEDURE — 6370000000 HC RX 637 (ALT 250 FOR IP): Performed by: PHYSICIAN ASSISTANT

## 2022-10-22 PROCEDURE — 84703 CHORIONIC GONADOTROPIN ASSAY: CPT

## 2022-10-22 PROCEDURE — 6360000002 HC RX W HCPCS: Performed by: PHYSICIAN ASSISTANT

## 2022-10-22 PROCEDURE — 93005 ELECTROCARDIOGRAM TRACING: CPT | Performed by: EMERGENCY MEDICINE

## 2022-10-22 PROCEDURE — 84484 ASSAY OF TROPONIN QUANT: CPT

## 2022-10-22 PROCEDURE — 99285 EMERGENCY DEPT VISIT HI MDM: CPT

## 2022-10-22 RX ORDER — ONDANSETRON 2 MG/ML
4 INJECTION INTRAMUSCULAR; INTRAVENOUS ONCE
Status: COMPLETED | OUTPATIENT
Start: 2022-10-22 | End: 2022-10-22

## 2022-10-22 RX ORDER — OXYCODONE HYDROCHLORIDE AND ACETAMINOPHEN 5; 325 MG/1; MG/1
1 TABLET ORAL ONCE
Status: COMPLETED | OUTPATIENT
Start: 2022-10-22 | End: 2022-10-22

## 2022-10-22 RX ORDER — ONDANSETRON 4 MG/1
4 TABLET, ORALLY DISINTEGRATING ORAL EVERY 12 HOURS PRN
Qty: 12 TABLET | Refills: 1 | Status: SHIPPED | OUTPATIENT
Start: 2022-10-22 | End: 2022-11-07

## 2022-10-22 RX ADMIN — IOPAMIDOL 75 ML: 755 INJECTION, SOLUTION INTRAVENOUS at 11:59

## 2022-10-22 RX ADMIN — OXYCODONE AND ACETAMINOPHEN 1 TABLET: 5; 325 TABLET ORAL at 13:57

## 2022-10-22 RX ADMIN — HYDROMORPHONE HYDROCHLORIDE 0.5 MG: 1 INJECTION, SOLUTION INTRAMUSCULAR; INTRAVENOUS; SUBCUTANEOUS at 11:24

## 2022-10-22 RX ADMIN — ONDANSETRON 4 MG: 2 INJECTION INTRAMUSCULAR; INTRAVENOUS at 11:24

## 2022-10-22 ASSESSMENT — PAIN SCALES - GENERAL
PAINLEVEL_OUTOF10: 9
PAINLEVEL_OUTOF10: 10
PAINLEVEL_OUTOF10: 9
PAINLEVEL_OUTOF10: 4
PAINLEVEL_OUTOF10: 8

## 2022-10-22 ASSESSMENT — ENCOUNTER SYMPTOMS
SHORTNESS OF BREATH: 1
COUGH: 0
BACK PAIN: 0
SORE THROAT: 0
ABDOMINAL PAIN: 1
VOMITING: 1
NAUSEA: 1
EYE PAIN: 0

## 2022-10-22 ASSESSMENT — PAIN DESCRIPTION - LOCATION
LOCATION: CHEST

## 2022-10-22 ASSESSMENT — PAIN DESCRIPTION - DESCRIPTORS: DESCRIPTORS: SHARP

## 2022-10-22 ASSESSMENT — PAIN - FUNCTIONAL ASSESSMENT
PAIN_FUNCTIONAL_ASSESSMENT: 0-10
PAIN_FUNCTIONAL_ASSESSMENT: 0-10

## 2022-10-22 NOTE — ED PROVIDER NOTES
**ADVANCED PRACTICE PROVIDER, I HAVE EVALUATED THIS PATIENT**        [address]      Pt Name: Ashlee MIGUEL:[id number]  Sadie 1980  Date of evaluation: 10/22/2022  Provider: Vilma Nick PA-C      Chief Complaint:    Chief Complaint   Patient presents with    Chest Pain     Upper chest discomfort since yesterday but has been awake since 0400 this morning, did have an episode of emesis this morning, pcp encouraged her to come here for PE R/O. No clotting HX. Nursing Notes, Past Medical Hx, Past Surgical Hx, Social Hx, Allergies, and Family Hx were all reviewed and agreed with or any disagreements were addressed in the HPI.    HPI: (Location, Duration, Timing, Severity, Quality, Assoc Sx, Context, Modifying factors)    Chief Complaint of chest pain mid upper chest since yesterday. Says she just had recent surgery for hernia and thinks she might have a PE. Nausea and vomiting. Cannot keep anything down. States she is having some lower abdominal pain as well had a recent hernia repair and had some complication at the suture site. Cc Dr. Noelle Rodriguez who was her surgeon. Denies fever. No extremity pain or weakness. No other complaints. This is a  38 y.o. female who presents to the emergency room with the above complaint. PastMedical/Surgical History:      Diagnosis Date    ADHD (attention deficit hyperactivity disorder) 1988    Depression with anxiety     Diabetes mellitus ([facility] [address]     pre-diabetes    Difficult intubation     airway swelled up    Encounter for imaging to screen for metal prior to MRI 06/01/2021    MRI Conditional Medtronic Non-Programmable shunt model#96705 implanted 10/30/2020 at [facility]. Normal Mode. 1.5T or 3.0T. ESBL (extended spectrum beta-lactamase) producing bacteria infection 11/06/2019    urine    Functional ovarian cysts 2008    rt ovary cyst x 2 yrs.     Headache(784.0) migraines    History of blood transfusion     at birth    History of kidney stones     History of PCOS     had hysterectomy in 2016    Hydrocephalus [facility])     Hyperlipidemia     Irritable bowel syndrome 2004    had colonoscopy about 6 yrs ago    Meningitis     Neutrophilic leukocytosis     Nicotine dependence     PONV (postoperative nausea and vomiting)     very nauseated and sometimes wakes up with a Migraine--happened once after brain surgery    Primary osteoarthritis of left knee 2016    S/P cone biopsy of cervix 2004    Scoliosis 1990.s    Seizures ([address]     twice--last one in 2022     (ventriculoperitoneal) shunt status     hydrocephalus f/w Neurosurgeon at [facility]    Wears glasses     reading         Procedure Laterality Date    ABDOMEN SURGERY N/A 2021    REMOVAL OF ABDOMINAL WALL MASS performed by Renée Toth MD at [facility]      appendicitis     SECTION  2005    placenta previa    CHOLECYSTECTOMY      COLONOSCOPY  2004    COLPOSCOPY      CSF SHUNT      replaced at age 17    CYSTOSCOPY      stone removal    HEMORRHOID SURGERY      HERNIA REPAIR Bilateral     inguinal    HERNIA REPAIR      hiatal hernia    HYSTERECTOMY, TOTAL ABDOMINAL (CERVIX REMOVED)  2016    TAHBSO, adhesions/PCOS    KNEE ARTHROSCOPY Left 2015    medial meniscectomy, chondroplasty, plica resection    LITHOTRIPSY Bilateral 12/10/2019    OTHER SURGICAL HISTORY  10/19/2016    op lap    VENTRAL HERNIA REPAIR N/A 2022    LAPAROSCOPIC LYSIS OF ADHESIONS AND ABDOMINAL WALL MASS REMOVAL performed by Renée Toth MD at [place] [address]      multiple revisions, most recent        Medications:  Previous Medications    ALBUTEROL SULFATE HFA (VENTOLIN HFA) 108 (90 BASE) MCG/ACT INHALER    Inhale 2 puffs into the lungs 4 times daily as needed for Wheezing    GABAPENTIN (NEURONTIN) 400 MG CAPSULE    Take 400 mg by mouth 3 times daily.    HYDROXYZINE HCL (ATARAX) 25 MG TABLET    Take 25 mg by mouth 3 times daily as needed for Itching    LISDEXAMFETAMINE (VYVANSE) 50 MG CAPSULE    Take 1 capsule by mouth every morning for 7 days. OXYCODONE (ROXICODONE) 5 MG IMMEDIATE RELEASE TABLET    Take 5 mg by mouth every 6 hours as needed for Pain. Review of Systems:  (2-9 systems needed)  Review of Systems   Constitutional:  Negative for chills and fever. HENT:  Negative for congestion and sore throat. Eyes:  Negative for pain and visual disturbance. Respiratory:  Positive for shortness of breath. Negative for cough. Cardiovascular:  Positive for chest pain. Negative for leg swelling. Gastrointestinal:  Positive for abdominal pain, nausea and vomiting. Genitourinary:  Negative for dysuria and frequency. Musculoskeletal:  Negative for back pain and neck pain. Skin:  Negative for rash and wound. Neurological:  Negative for dizziness and light-headedness. \"Positives and Pertinent negatives as per HPI\"    Physical Exam:  Physical Exam  Vitals and nursing note reviewed. Constitutional:       Appearance: She is well-developed. She is not diaphoretic. HENT:      Head: Normocephalic and atraumatic. Nose: Nose normal.      Mouth/Throat:      Mouth: Mucous membranes are moist.      Pharynx: Oropharynx is clear. No oropharyngeal exudate or posterior oropharyngeal erythema. Eyes:      General:         Right eye: No discharge. Left eye: No discharge. Cardiovascular:      Rate and Rhythm: Normal rate and regular rhythm. Heart sounds: Normal heart sounds. No murmur heard. No friction rub. No gallop. Pulmonary:      Effort: Pulmonary effort is normal. No respiratory distress. Breath sounds: Normal breath sounds. No wheezing or rales. Chest:      Chest wall: No tenderness. Abdominal:      General: Bowel sounds are normal. There is no distension. Palpations: Abdomen is soft.  There is no mass.      Tenderness: There is no abdominal tenderness. There is no guarding or rebound. Musculoskeletal:         General: Normal range of motion. Cervical back: Normal range of motion and neck supple. Skin:     General: Skin is warm and dry. Neurological:      General: No focal deficit present. Mental Status: She is alert and oriented to person, place, and time. Psychiatric:         Behavior: Behavior normal.       MEDICAL DECISION MAKING    Vitals:    Vitals:    10/22/22 1050 10/22/22 1100 10/22/22 1145 10/22/22 1215   BP: 125/84 107/79 105/76 118/77   Pulse: (!) 117 (!) 103 96 88   Resp: 17 18 16 14   SpO2: 98% 100% 95% 100%   Weight:       Height:           LABS:  Labs Reviewed   CBC WITH AUTO DIFFERENTIAL - Abnormal; Notable for the following components:       Result Value    RDW 15.9 (*)     All other components within normal limits   COMPREHENSIVE METABOLIC PANEL - Abnormal; Notable for the following components:    Glucose 119 (*)     Alkaline Phosphatase 144 (*)     All other components within normal limits   HCG, SERUM, QUALITATIVE   TROPONIN   LIPASE        Remainder of labs reviewed and were negative at this time or not returned at the time of this note. RADIOLOGY:   Non-plain film images such as CT, Ultrasound and MRI are read by the radiologist. Semaj Weinberg PA-C have directly visualized the radiologic plain film image(s) with the below findings:      Interpretation per the Radiologist below, if available at the time of this note:    CT ABDOMEN PELVIS W IV CONTRAST Additional Contrast? None   Final Result   CT PA:      No evidence of pulmonary embolism or dissection. Overall, no acute   abnormality identified. Abdomen and pelvis CT:      Redemonstration of a ventral pelvic wall ulceration, with subjacent   inflammatory/infectious tract, with a small amount of residual gas.   This   appears decreased in conspicuity when compared to the previous exam.      Punctate nonobstructing right nephrolithiasis. CT CHEST PULMONARY EMBOLISM W CONTRAST   Final Result   CT PA:      No evidence of pulmonary embolism or dissection. Overall, no acute   abnormality identified. Abdomen and pelvis CT:      Redemonstration of a ventral pelvic wall ulceration, with subjacent   inflammatory/infectious tract, with a small amount of residual gas. This   appears decreased in conspicuity when compared to the previous exam.      Punctate nonobstructing right nephrolithiasis. XR CHEST (2 VW)   Final Result   No acute cardiopulmonary disease. CT ABDOMEN PELVIS WO CONTRAST Additional Contrast? None    Result Date: 9/25/2022  EXAMINATION: CT OF THE ABDOMEN AND PELVIS WITHOUT CONTRAST 9/25/2022 5:02 pm TECHNIQUE: CT of the abdomen and pelvis was performed without the administration of intravenous contrast. Multiplanar reformatted images are provided for review. Automated exposure control, iterative reconstruction, and/or weight based adjustment of the mA/kV was utilized to reduce the radiation dose to as low as reasonably achievable. COMPARISON: 09/21/2022 HISTORY: Acute abdominal pain. FINDINGS: Lower Chest: No acute abnormality. Organs: Cholecystectomy. Stable 2 mm right renal stone. No hydronephrosis. Remaining solid organs are unremarkable. GI/Bowel: No acute abnormality. The appendix has been removed. Minimal sigmoid diverticulosis. Pelvis: Bladder is unremarkable. Hysterectomy. No lymphadenopathy or free fluid. Peritoneum/Retroperitoneum: No ascites or significant lymphadenopathy. Mild atherosclerotic disease. Bones/Soft Tissues: Unremarkable. No acute abnormality. Stable 2 mm right renal stone.      XR CHEST (2 VW)    Result Date: 10/22/2022  EXAMINATION: TWO XRAY VIEWS OF THE CHEST 10/22/2022 10:40 am COMPARISON: 10/09/2022 HISTORY: ORDERING SYSTEM PROVIDED HISTORY: Chest pain and tachycardia TECHNOLOGIST PROVIDED HISTORY: Reason for exam:->Chest pain and tachycardia Reason for Exam: cp FINDINGS: Visualized left ventriculoperitoneal shunt is contiguous, coiled distally in the right upper quadrant. Cardiac silhouette, mediastinal hilar contours are normal.  There is no consolidation, pleural effusion or pneumothorax. Stable scoliosis of the thoracic spine, dextroconvexity. No acute cardiopulmonary disease. XR CHEST (2 VW)    Result Date: 9/25/2022  EXAMINATION: TWO XRAY VIEWS OF THE CHEST 9/25/2022 1:31 pm COMPARISON: April 7, 2022 HISTORY: ORDERING SYSTEM PROVIDED HISTORY: Abdominal pain TECHNOLOGIST PROVIDED HISTORY: Reason for exam:->Abdominal pain Reason for Exam: Right side mid to lower Abdominal Pain; Fever FINDINGS: Upright frontal and lateral view chest radiographs were obtained. The heart size, mediastinal contour and pleural spaces are within normal limits. Mild diffuse chronic-appearing interstitial markings, unchanged. There is no focal consolidation or pneumothorax. The pulmonary vascular pattern is within normal limits. Thoracolumbar scoliotic deformity again noted along with a ventriculoperitoneal shunt catheter traversing the left chest.     No acute cardiopulmonary abnormality. CT ABDOMEN PELVIS W IV CONTRAST Additional Contrast? None    Result Date: 10/22/2022  EXAMINATION: CTA OF THE CHEST; CT OF THE ABDOMEN AND PELVIS WITH CONTRAST 10/22/2022 8:58 am TECHNIQUE: CTA of the chest was performed after the administration of intravenous contrast.  Multiplanar reformatted images are provided for review. MIP images are provided for review. Automated exposure control, iterative reconstruction, and/or weight based adjustment of the mA/kV was utilized to reduce the radiation dose to as low as reasonably achievable.; CT of the abdomen and pelvis was performed with the administration of intravenous contrast. Multiplanar reformatted images are provided for review.  Automated exposure control, iterative reconstruction, and/or weight based adjustment of the mA/kV was utilized to reduce the radiation dose to as low as reasonably achievable. COMPARISON: None. HISTORY: ORDERING SYSTEM PROVIDED HISTORY: Chest pain and tachycardia. TECHNOLOGIST PROVIDED HISTORY: Reason for exam:->Chest pain and tachycardia. Decision Support Exception - unselect if not a suspected or confirmed emergency medical condition->Emergency Medical Condition (MA) Reason for Exam: recent abd surgery, chest pain; ORDERING SYSTEM PROVIDED HISTORY: Abdominal pain. Recent hernia repair TECHNOLOGIST PROVIDED HISTORY: Additional Contrast?->None Reason for exam:->Abdominal pain. Recent hernia repair Decision Support Exception - unselect if not a suspected or confirmed emergency medical condition->Emergency Medical Condition (MA) Reason for Exam: recent abd surgery, chest pain FINDINGS: CT PA: Pulmonary Arteries: Pulmonary arteries are adequately opacified for evaluation. No evidence of intraluminal filling defect to suggest pulmonary embolism. Main pulmonary artery is normal in caliber. Mediastinum: Visualized thyroid unremarkable. No mediastinal or hilar lymphadenopathy. Esophagus is unremarkable. Cardiac chambers unremarkable. No pericardial effusion. Thoracic aorta is normal in caliber without evidence of dissection. Lungs/pleura: Mild dependent atelectasis. Lungs otherwise clear. Soft Tissues/Bones: Visualized extra thoracic soft tissues unremarkable. No acute bony abnormality identified. Mild dextrocurvature of the thoracic spine is seen. Abdomen/Pelvis: Organs: No acute or suspicious hepatic abnormality identified. Gallbladder is surgically absent. Spleen enhances normally. Adrenals unremarkable. Pancreas unremarkable. No acute or suspicious renal abnormality identified. Punctate nonobstructing nephrolithiasis noted in the lower pole of the right kidney. No ureteral calculi are found. GI/Bowel: No large bowel abnormalities are identified.   The appendix is not well visualized. No asymmetric pericecal inflammation is seen to suggest acute appendicitis. Distal esophagus and stomach unremarkable. Small bowel unremarkable. Pelvis: Uterus is surgically absent. Urinary bladder unremarkable. No free pelvic fluid. Peritoneum/Retroperitoneum: Abdominal aorta normal in caliber. No retroperitoneal lymphadenopathy. Superior mesenteric artery is enhancing. Ventriculoperitoneal shunt is seen with its tip terminating anteriorly within the mid abdomen, just to the right of midline. Bones/Soft Tissues: No acute or suspicious bony abnormality. Scarring within the ventral abdominal wall seen from hernia repair. No recurrent herniation is identified. Skin ulcerations seen previously involving the anterior pelvic wall is decreased in conspicuity. A small inflammatory tract extends into the subcutaneous fat in that region, with a small bubble of gas. That appears similar to decreased. CT PA: No evidence of pulmonary embolism or dissection. Overall, no acute abnormality identified. Abdomen and pelvis CT: Redemonstration of a ventral pelvic wall ulceration, with subjacent inflammatory/infectious tract, with a small amount of residual gas. This appears decreased in conspicuity when compared to the previous exam. Punctate nonobstructing right nephrolithiasis. CT ABDOMEN PELVIS W IV CONTRAST Additional Contrast? None    Result Date: 10/9/2022  EXAMINATION: CT OF THE ABDOMEN AND PELVIS WITH CONTRAST 10/9/2022 6:47 pm TECHNIQUE: CT of the abdomen and pelvis was performed with the administration of intravenous contrast. Multiplanar reformatted images are provided for review. Automated exposure control, iterative reconstruction, and/or weight based adjustment of the mA/kV was utilized to reduce the radiation dose to as low as reasonably achievable.  COMPARISON: None HISTORY: ORDERING SYSTEM PROVIDED HISTORY: abd wall swelling / redness / drainage / fever / recent hernia repair TECHNOLOGIST PROVIDED HISTORY: Reason for exam:->abd wall swelling / redness / drainage / fever / recent hernia repair Additional Contrast?->None Decision Support Exception - unselect if not a suspected or confirmed emergency medical condition->Emergency Medical Condition (MA) Reason for Exam: abd wall swelling / redness / drainage / fever / recent hernia repair FINDINGS: Lower Chest: No active disease. Organs: The liver appears unremarkable. Status post cholecystectomy. Pancreas and spleen appear unremarkable. Adrenals aorta and IVC appear normal.  Left kidney is normal.  The right kidney demonstrates nephrolithiasis with a 2 mm stone in the lower pole of the right kidney. No obstructive uropathy. No evidence of pyelonephritis. GI/Bowel: No evidence of bowel obstruction perforation or thickening. Minimal diverticulosis but no acute diverticulitis. Small bowel appears unremarkable. Pelvis: Urinary bladder appears unremarkable. Status post hysterectomy. No evidence of inguinal hernia. Peritoneum/Retroperitoneum: No evidence of retroperitoneal lymphadenopathy. Bones/Soft Tissues: In the anterior pelvic wall an area of skin ulceration surrounded by skin thickening as well as subcutaneous inflammation. No evidence of abscess. This likely represents severe cellulitis with ulceration. 1. In the anterior pelvic wall an area of skin thickening with ulceration as well as subcutaneous inflammatory changes. No evidence of abscess. Severe cellulitis is likely. 2. No evidence of significant hernia. 3. Right nephrolithiasis but no acute obstructive uropathy or pyelonephritis. CT ABDOMEN PELVIS W IV CONTRAST Additional Contrast? None    Result Date: 10/3/2022  EXAMINATION: CT OF THE ABDOMEN AND PELVIS WITH CONTRAST 10/3/2022 4:41 pm TECHNIQUE: CT of the abdomen and pelvis was performed with the administration of intravenous contrast. Multiplanar reformatted images are provided for review.  Automated exposure control, iterative reconstruction, and/or weight based adjustment of the mA/kV was utilized to reduce the radiation dose to as low as reasonably achievable. COMPARISON: September 25, 2022 HISTORY: ORDERING SYSTEM PROVIDED HISTORY: Wound separation in the incision of the lower abdomen. Rule out abscess TECHNOLOGIST PROVIDED HISTORY: Additional Contrast?->None Reason for exam:->Wound separation in the incision of the lower abdomen. Rule out abscess Decision Support Exception - unselect if not a suspected or confirmed emergency medical condition->Emergency Medical Condition (MA) Reason for Exam: Wound separation in the incision of the lower abdomen. Rule out abscess FINDINGS: Lower Chest: No active disease. Organs: The liver appears unremarkable. Status post cholecystectomy. Pancreas and spleen, adrenals, kidneys, aorta and IVC appear stable. GI/Bowel: No evidence of acute bowel obstruction, perforation or thickening. Mild diverticulosis. No acute diverticulitis. Small bowel appears unremarkable. Pelvis: Status post hysterectomy. Urinary bladder appears unremarkable. No adnexal abnormality. Peritoneum/Retroperitoneum: No evidence of retroperitoneal lymphadenopathy or acute peritoneal findings. Bones/Soft Tissues: A shunt catheter is noted with the tip in the anterior pelvis. No evidence of anterior abdominal wall abscess or dehiscence. No acute lumbar spine abnormality. 1. No evidence of postoperative complications or anterior abdominal wall abscess. 2. Stable shunt catheter with the tip in the anterior pelvis. 3. Mild diverticulosis but no acute diverticulitis. CT ABDOMEN PELVIS W IV CONTRAST Additional Contrast? None    Result Date: 9/25/2022  EXAMINATION: CT OF THE ABDOMEN AND PELVIS WITH CONTRAST 9/25/2022 6:20 pm TECHNIQUE: CT of the abdomen and pelvis was performed with the administration of 75 mL Isovue 370 intravenous contrast. Multiplanar reformatted images are provided for review.  Automated exposure control, iterative reconstruction, and/or weight based adjustment of the mA/kV was utilized to reduce the radiation dose to as low as reasonably achievable. COMPARISON: Noncontrast CT earlier today HISTORY: Acute abdominal pain. FINDINGS: Lower Chest: Unremarkable. Organs: Cholecystectomy. Stable tiny right renal stone. Remaining solid abdominal organs are unremarkable. GI/Bowel: No acute abnormality. The appendix has been removed. Minimal sigmoid diverticulosis. Pelvis: Bladder is unremarkable. Hysterectomy. No lymphadenopathy or free fluid. Peritoneum/Retroperitoneum: No ascites or significant lymphadenopathy.  shunt catheter terminates near the umbilicus. Bones/Soft Tissues: Unremarkable. No acute abnormality. Stable tiny right renal stone. XR CHEST PORTABLE    Result Date: 10/9/2022  EXAMINATION: ONE XRAY VIEW OF THE CHEST 10/9/2022 4:54 pm COMPARISON: 10/06/2022 HISTORY: ORDERING SYSTEM PROVIDED HISTORY: fever TECHNOLOGIST PROVIDED HISTORY: Reason for exam:->fever Reason for Exam: fever FINDINGS: Partially visualized left-sided ventriculoperitoneal shunt. Cardiomediastinal silhouette appears within normal limits. No focal consolidation or overt pulmonary edema. Costophrenic angles are sharp. No evidence of pneumothorax. No acute osseous abnormalities. No radiographic evidence of an acute cardiopulmonary process. XR CHEST PORTABLE    Result Date: 10/6/2022  EXAMINATION: ONE XRAY VIEW OF THE CHEST 10/6/2022 5:09 am COMPARISON: 09/25/2022 HISTORY: ORDERING SYSTEM PROVIDED HISTORY: SOB TECHNOLOGIST PROVIDED HISTORY: Reason for exam:->SOB Reason for Exam: sob FINDINGS: Cardiac mediastinal silhouettes appear within normal limits for size. Pulmonary vascularity is normal.  No focal consolidation or pleural effusion. No pneumothorax. No acute osseous abnormality is identified. Dextroscoliotic deformity of the thoracic spine. Clear chest without acute cardiopulmonary process. XR CHEST PORTABLE    Result Date: 9/22/2022  Site: [place] #: [id number] #: [id number] #: [de-identified] #: [id number] #: [id number]: XR CHEST AP PORTABLEExam Date/Time: 09/22/2022 03:00 PMAdmitting Diagnosis: Chest painReason for Exam: Chest  pain Dictated by: Loretta Louise CANDE: 09/22/2022 03:14 PMT: This document is confidential medical information. Unauthorized disclosure or use of this information is prohibited by law. If you are not the intended recipient of this document, please advise us by calling immediately [phone]. Impression/Conclusion below HISTORY:   Chest pain Vomiting; Tested positive yesterday at [facility]; COMPARISON: 6/14/2022 NOTE:  If there are questions about the content of this report, please contact [address] [place] [address] radiology by calling [phone] FINDINGS: LINES/DEVICES: None LUNGS/PLEURA:  Unremarkable HEART:  Unremarkable MEDIASTINUM/SARAH:  Unremarkable BONES:  Dextroscoliosis. OTHER:  None  IMPRESSION: No acute abnormality SIGNED BY: Mo Bills MD on 9/22/2022  3:11 PM   [facility] Imaging Report - [address] [phone] -  [facility] Call Center: [phone]     CT CHEST PULMONARY EMBOLISM W CONTRAST    Result Date: 10/22/2022  EXAMINATION: CTA OF THE CHEST; CT OF THE ABDOMEN AND PELVIS WITH CONTRAST 10/22/2022 8:58 am TECHNIQUE: CTA of the chest was performed after the administration of intravenous contrast.  Multiplanar reformatted images are provided for review. MIP images are provided for review. Automated exposure control, iterative reconstruction, and/or weight based adjustment of the mA/kV was utilized to reduce the radiation dose to as low as reasonably achievable.; CT of the abdomen and pelvis was performed with the administration of intravenous contrast. Multiplanar reformatted images are provided for review.  Automated exposure control, iterative reconstruction, and/or weight based adjustment of the mA/kV was utilized to reduce the radiation dose to as low as reasonably achievable. COMPARISON: None. HISTORY: ORDERING SYSTEM PROVIDED HISTORY: Chest pain and tachycardia. TECHNOLOGIST PROVIDED HISTORY: Reason for exam:->Chest pain and tachycardia. Decision Support Exception - unselect if not a suspected or confirmed emergency medical condition->Emergency Medical Condition (MA) Reason for Exam: recent abd surgery, chest pain; ORDERING SYSTEM PROVIDED HISTORY: Abdominal pain. Recent hernia repair TECHNOLOGIST PROVIDED HISTORY: Additional Contrast?->None Reason for exam:->Abdominal pain. Recent hernia repair Decision Support Exception - unselect if not a suspected or confirmed emergency medical condition->Emergency Medical Condition (MA) Reason for Exam: recent abd surgery, chest pain FINDINGS: CT PA: Pulmonary Arteries: Pulmonary arteries are adequately opacified for evaluation. No evidence of intraluminal filling defect to suggest pulmonary embolism. Main pulmonary artery is normal in caliber. Mediastinum: Visualized thyroid unremarkable. No mediastinal or hilar lymphadenopathy. Esophagus is unremarkable. Cardiac chambers unremarkable. No pericardial effusion. Thoracic aorta is normal in caliber without evidence of dissection. Lungs/pleura: Mild dependent atelectasis. Lungs otherwise clear. Soft Tissues/Bones: Visualized extra thoracic soft tissues unremarkable. No acute bony abnormality identified. Mild dextrocurvature of the thoracic spine is seen. Abdomen/Pelvis: Organs: No acute or suspicious hepatic abnormality identified. Gallbladder is surgically absent. Spleen enhances normally. Adrenals unremarkable. Pancreas unremarkable. No acute or suspicious renal abnormality identified. Punctate nonobstructing nephrolithiasis noted in the lower pole of the right kidney. No ureteral calculi are found. GI/Bowel: No large bowel abnormalities are identified. The appendix is not well visualized.   No asymmetric pericecal inflammation is seen to suggest acute appendicitis. Distal esophagus and stomach unremarkable. Small bowel unremarkable. Pelvis: Uterus is surgically absent. Urinary bladder unremarkable. No free pelvic fluid. Peritoneum/Retroperitoneum: Abdominal aorta normal in caliber. No retroperitoneal lymphadenopathy. Superior mesenteric artery is enhancing. Ventriculoperitoneal shunt is seen with its tip terminating anteriorly within the mid abdomen, just to the right of midline. Bones/Soft Tissues: No acute or suspicious bony abnormality. Scarring within the ventral abdominal wall seen from hernia repair. No recurrent herniation is identified. Skin ulcerations seen previously involving the anterior pelvic wall is decreased in conspicuity. A small inflammatory tract extends into the subcutaneous fat in that region, with a small bubble of gas. That appears similar to decreased. CT PA: No evidence of pulmonary embolism or dissection. Overall, no acute abnormality identified. Abdomen and pelvis CT: Redemonstration of a ventral pelvic wall ulceration, with subjacent inflammatory/infectious tract, with a small amount of residual gas. This appears decreased in conspicuity when compared to the previous exam. Punctate nonobstructing right nephrolithiasis. MRI ABDOMEN WO CONTRAST MRCP    Result Date: 9/27/2022  EXAMINATION: MRI OF THE ABDOMEN WITHOUT CONTRAST AND MRCP 9/27/2022 12:30 pm TECHNIQUE: Multiplanar multisequence MRI of the abdomen was performed without the administration of intravenous contrast.  After initial T2 axial and coronal images, thick slab, thin slab and 3D coronal MRCP sequences were obtained without the administration of intravenous contrast.  MIP images are provided for review.  COMPARISON: Recent CT scan 09/25/2022 HISTORY: ORDERING SYSTEM PROVIDED HISTORY: evaluate biliary ductal dilation TECHNOLOGIST PROVIDED HISTORY: Reason for exam:->evaluate biliary ductal dilation Reason for Exam: evaluate biliary ductal dilation FINDINGS: Motion artifact degrades the study. This decreases sensitivity Mild signal loss seen in liver on out of phase images, suggesting fatty infiltration. Gallbladder: Surgically absent Bile Ducts: There is mild intra and extrahepatic biliary ductal dilatation seen. Extrahepatic common duct measures 8 mm. .  No retained common duct stone noted Pancreatic Duct: No pancreatic ductal dilatation. No peripancreatic fluid Adrenal glands unremarkable No hydronephrosis on the right. No hydronephrosis on the left. No significant small bowel distention noted No retroperitoneal adenopathy Catheter is seen in anterior abdominal wall. No significant small or large bowel distention noted A few colonic diverticula are seen. Mild spurring is seen in the spine     Surgically absent gallbladder. No retained common duct stone noted          MEDICAL DECISION MAKING / ED COURSE:      PROCEDURES:   Procedures    None    Patient was given:  Medications   oxyCODONE-acetaminophen (PERCOCET) 5-325 MG per tablet 1 tablet (has no administration in time range)   ondansetron (ZOFRAN) injection 4 mg (4 mg IntraVENous Given 10/22/22 1124)   HYDROmorphone (DILAUDID) injection 0.5 mg (0.5 mg IntraVENous Given 10/22/22 1124)   iopamidol (ISOVUE-370) 76 % injection 75 mL (75 mLs IntraVENous Given 10/22/22 1159)     Emergency room course: Patient on exam cardiovascular regular rhythm, lungs are clear. No wheeze rales or rhonchi noted. Unable to reproduce chest wall tenderness where she complained of midsternal upper. She does states she gets pain when she takes a deep breath. Abdomen is soft. She has no surrounding erythema around the scar on the right lower abdomen. Nodes no palpable mass. No warmth around the area. There is no drainage. Incision looks good. No CVA or flank tenderness. Full range of motion all extremity alert oriented x4.   Does not appear to be in acute distress. CBC within normal limits with a white count of 10.3. CMP is unremarkable. Troponin less than 0.01. Qualitative hCG is negative. Lipase 23.0. CT chest rule out PE is negative for PE.  CT abdomen and pelvis with IV contrast shows no acute abnormality. Ulcerated lesion at the suture site is decreasing. Chest x-ray shows no acute cardiopulmonary process. At this point I did discuss with patient her test results lab results. Inform her that everything is looking okay. I will have her follow back up with Dr. Carlos Alegria. And she was okay with this plan. She will be given Zofran for home continue her home medication she does tells me that she has Percocet at home for pain. Return for any worsening. The patient tolerated their visit well. I evaluated the patient. The physician was available for consultation as needed. The patient and / or the family were informed of the results of any tests, a time was given to answer questions, a plan was proposed and they agreed with plan. I am the Primary Clinician of Record. CLINICAL IMPRESSION:  1. Chest pain, unspecified type    2. Abdominal pain, unspecified abdominal location    3. Nausea and vomiting, unspecified vomiting type        DISPOSITION Decision To Discharge 10/22/2022 01:44:41 PM      PATIENT REFERRED TO:  [address] [place] [address], APRN - CNP  [address] [place] [id number]  [phone]    Call   As needed    Bridget Harrington MD  [facility].  [address] [facility] [address] [phone]    Call in 2 days      DISCHARGE MEDICATIONS:  New Prescriptions    ONDANSETRON (ZOFRAN ODT) 4 MG DISINTEGRATING TABLET    Take 1 tablet by mouth every 12 hours as needed for Nausea       DISCONTINUED MEDICATIONS:  Discontinued Medications    No medications on file              (Please note the MDM and HPI sections of this note were completed with a voice recognition program.  Efforts were made to edit the dictations but occasionally words are mis-transcribed.)    Electronically signed, Zabrina Laboy PA-C,          Zabrina Laboy PA-C  10/22/22 0896

## 2022-10-22 NOTE — ED NOTES
Discharge and education instructions reviewed. Patient verbalized understanding, teach-back successful. Patient denied questions at this time. No acute distress noted. Patient instructed to follow-up as noted - return to emergency department if symptoms worsen. Patient verbalized understanding. Discharged per EDMD with discharge instructions.         Sarah Price RN  10/22/22 5772

## 2022-10-22 NOTE — DISCHARGE INSTRUCTIONS
Follow-up with Dr. Rut Conti your general surgeon  Follow-up your primary care physician as needed  Continue home medication as prescribed only  Take prescribed medication as prescribed only

## 2022-10-23 LAB
EKG ATRIAL RATE: 125 BPM
EKG DIAGNOSIS: NORMAL
EKG P AXIS: 47 DEGREES
EKG P-R INTERVAL: 124 MS
EKG Q-T INTERVAL: 338 MS
EKG QRS DURATION: 74 MS
EKG QTC CALCULATION (BAZETT): 487 MS
EKG R AXIS: 2 DEGREES
EKG T AXIS: 54 DEGREES
EKG VENTRICULAR RATE: 125 BPM

## 2022-10-23 PROCEDURE — 93010 ELECTROCARDIOGRAM REPORT: CPT | Performed by: INTERNAL MEDICINE

## 2022-10-25 ENCOUNTER — APPOINTMENT (OUTPATIENT)
Dept: CT IMAGING | Age: 42
End: 2022-10-25
Payer: COMMERCIAL

## 2022-10-25 ENCOUNTER — APPOINTMENT (OUTPATIENT)
Dept: GENERAL RADIOLOGY | Age: 42
End: 2022-10-25
Payer: COMMERCIAL

## 2022-10-25 ENCOUNTER — HOSPITAL ENCOUNTER (EMERGENCY)
Age: 42
Discharge: HOME | End: 2022-10-25
Attending: EMERGENCY MEDICINE
Payer: COMMERCIAL

## 2022-10-25 VITALS
OXYGEN SATURATION: 99 % | DIASTOLIC BLOOD PRESSURE: 79 MMHG | RESPIRATION RATE: 14 BRPM | HEART RATE: 106 BPM | BODY MASS INDEX: 32.32 KG/M2 | TEMPERATURE: 98.7 F | SYSTOLIC BLOOD PRESSURE: 115 MMHG | WEIGHT: 160 LBS

## 2022-10-25 DIAGNOSIS — R55 SYNCOPE AND COLLAPSE: Primary | ICD-10-CM

## 2022-10-25 LAB
ALBUMIN SERPL-MCNC: 4.7 G/DL (ref 3.4–5)
ALP BLD-CCNC: 124 U/L (ref 40–129)
ALT SERPL-CCNC: 15 U/L (ref 10–40)
ANION GAP SERPL CALCULATED.3IONS-SCNC: 16 MMOL/L (ref 3–16)
AST SERPL-CCNC: 12 U/L (ref 15–37)
BASOPHILS ABSOLUTE: 0.1 K/UL (ref 0–0.2)
BASOPHILS RELATIVE PERCENT: 0.8 %
BILIRUB SERPL-MCNC: 0.4 MG/DL (ref 0–1)
BILIRUBIN DIRECT: <0.2 MG/DL (ref 0–0.3)
BILIRUBIN, INDIRECT: ABNORMAL MG/DL (ref 0–1)
BUN BLDV-MCNC: 17 MG/DL (ref 7–20)
CALCIUM SERPL-MCNC: 10.2 MG/DL (ref 8.3–10.6)
CHLORIDE BLD-SCNC: 99 MMOL/L (ref 99–110)
CO2: 21 MMOL/L (ref 21–32)
CREAT SERPL-MCNC: 0.7 MG/DL (ref 0.6–1.1)
EKG ATRIAL RATE: 95 BPM
EKG DIAGNOSIS: NORMAL
EKG P AXIS: 44 DEGREES
EKG P-R INTERVAL: 132 MS
EKG Q-T INTERVAL: 366 MS
EKG QRS DURATION: 78 MS
EKG QTC CALCULATION (BAZETT): 459 MS
EKG R AXIS: 5 DEGREES
EKG T AXIS: 32 DEGREES
EKG VENTRICULAR RATE: 95 BPM
EOSINOPHILS ABSOLUTE: 0.2 K/UL (ref 0–0.6)
EOSINOPHILS RELATIVE PERCENT: 1.7 %
GFR SERPL CREATININE-BSD FRML MDRD: >60 ML/MIN/{1.73_M2}
GLUCOSE BLD-MCNC: 129 MG/DL (ref 70–99)
GLUCOSE BLD-MCNC: 137 MG/DL (ref 70–99)
HCG QUALITATIVE: NEGATIVE
HCT VFR BLD CALC: 41.9 % (ref 36–48)
HEMOGLOBIN: 14.4 G/DL (ref 12–16)
LACTIC ACID: 1.6 MMOL/L (ref 0.4–2)
LIPASE: 33 U/L (ref 13–60)
LYMPHOCYTES ABSOLUTE: 2.7 K/UL (ref 1–5.1)
LYMPHOCYTES RELATIVE PERCENT: 24.3 %
MCH RBC QN AUTO: 28.2 PG (ref 26–34)
MCHC RBC AUTO-ENTMCNC: 34.4 G/DL (ref 31–36)
MCV RBC AUTO: 81.8 FL (ref 80–100)
MONOCYTES ABSOLUTE: 0.7 K/UL (ref 0–1.3)
MONOCYTES RELATIVE PERCENT: 6.2 %
NEUTROPHILS ABSOLUTE: 7.5 K/UL (ref 1.7–7.7)
NEUTROPHILS RELATIVE PERCENT: 67 %
PDW BLD-RTO: 15.1 % (ref 12.4–15.4)
PERFORMED ON: ABNORMAL
PLATELET # BLD: 306 K/UL (ref 135–450)
PMV BLD AUTO: 7.6 FL (ref 5–10.5)
POTASSIUM REFLEX MAGNESIUM: 3.6 MMOL/L (ref 3.5–5.1)
PRO-BNP: 35 PG/ML (ref 0–124)
RBC # BLD: 5.13 M/UL (ref 4–5.2)
SODIUM BLD-SCNC: 136 MMOL/L (ref 136–145)
TOTAL CK: 61 U/L (ref 26–192)
TOTAL PROTEIN: 7.6 G/DL (ref 6.4–8.2)
TROPONIN: <0.01 NG/ML
WBC # BLD: 11.2 K/UL (ref 4–11)

## 2022-10-25 PROCEDURE — 70450 CT HEAD/BRAIN W/O DYE: CPT

## 2022-10-25 PROCEDURE — 96374 THER/PROPH/DIAG INJ IV PUSH: CPT

## 2022-10-25 PROCEDURE — 93010 ELECTROCARDIOGRAM REPORT: CPT | Performed by: INTERNAL MEDICINE

## 2022-10-25 PROCEDURE — 84484 ASSAY OF TROPONIN QUANT: CPT

## 2022-10-25 PROCEDURE — 85025 COMPLETE CBC W/AUTO DIFF WBC: CPT

## 2022-10-25 PROCEDURE — 71045 X-RAY EXAM CHEST 1 VIEW: CPT

## 2022-10-25 PROCEDURE — 93005 ELECTROCARDIOGRAM TRACING: CPT | Performed by: EMERGENCY MEDICINE

## 2022-10-25 PROCEDURE — 6360000002 HC RX W HCPCS: Performed by: EMERGENCY MEDICINE

## 2022-10-25 PROCEDURE — 83605 ASSAY OF LACTIC ACID: CPT

## 2022-10-25 PROCEDURE — 71250 CT THORAX DX C-: CPT

## 2022-10-25 PROCEDURE — 82550 ASSAY OF CK (CPK): CPT

## 2022-10-25 PROCEDURE — 83880 ASSAY OF NATRIURETIC PEPTIDE: CPT

## 2022-10-25 PROCEDURE — 84703 CHORIONIC GONADOTROPIN ASSAY: CPT

## 2022-10-25 PROCEDURE — 83690 ASSAY OF LIPASE: CPT

## 2022-10-25 PROCEDURE — 80048 BASIC METABOLIC PNL TOTAL CA: CPT

## 2022-10-25 PROCEDURE — 80076 HEPATIC FUNCTION PANEL: CPT

## 2022-10-25 PROCEDURE — 99285 EMERGENCY DEPT VISIT HI MDM: CPT

## 2022-10-25 PROCEDURE — 6370000000 HC RX 637 (ALT 250 FOR IP): Performed by: EMERGENCY MEDICINE

## 2022-10-25 RX ORDER — ONDANSETRON 2 MG/ML
4 INJECTION INTRAMUSCULAR; INTRAVENOUS ONCE
Status: COMPLETED | OUTPATIENT
Start: 2022-10-25 | End: 2022-10-25

## 2022-10-25 RX ORDER — OXYCODONE HYDROCHLORIDE AND ACETAMINOPHEN 5; 325 MG/1; MG/1
1 TABLET ORAL ONCE
Status: COMPLETED | OUTPATIENT
Start: 2022-10-25 | End: 2022-10-25

## 2022-10-25 RX ADMIN — OXYCODONE AND ACETAMINOPHEN 1 TABLET: 5; 325 TABLET ORAL at 04:39

## 2022-10-25 RX ADMIN — ONDANSETRON 4 MG: 2 INJECTION INTRAMUSCULAR; INTRAVENOUS at 04:15

## 2022-10-25 ASSESSMENT — ENCOUNTER SYMPTOMS
SHORTNESS OF BREATH: 0
EYE ITCHING: 0
VOMITING: 0
EYE DISCHARGE: 0
ABDOMINAL PAIN: 0
COLOR CHANGE: 0
CONSTIPATION: 0
COUGH: 0

## 2022-10-25 ASSESSMENT — PAIN DESCRIPTION - LOCATION: LOCATION: ABDOMEN

## 2022-10-25 ASSESSMENT — PAIN SCALES - GENERAL: PAINLEVEL_OUTOF10: 8

## 2022-10-25 NOTE — ED TRIAGE NOTES
Patient presents to ED via [facility] for c/o seizure. Per EMS, patient was found on her bathroom floor by daughter seizing. Upon arrival of EMS, patient came out of seizure and no medications were given. Upon arrival to ED, patient lethargic but A&O x4. Patient voicing c/o pain stomach where her shunt is. Patient also c/o nausea.

## 2022-10-25 NOTE — ED PROVIDER NOTES
I PERSONALLY SAW THE PATIENT AND PERFORMED A SUBSTANTIVE PORTION OF THE VISIT INCLUDING ALL ASPECTS OF THE MEDICAL DECISION MAKING PROCESS. [address]      Pt Name: Dimitri Vines  MRN: [id number]  Sadie 1980  Date of evaluation: 10/25/2022  Provider: Dayami Tolliver MD    CHIEF COMPLAINT       Chief Complaint   Patient presents with    Seizures       HISTORY OF PRESENT Rickey Hinojosa is a 38 y.o. female who presents to the emergency department with seizure for syncope. Tonight patient had a syncopal episode. Supposedly was walking and fell to the ground and was seen shaking for a few seconds. No postictal phase. No history of seizures. Does have a shunt. Denies chest pain or shortness of breath. States this happened in June and was told she had a syncopal episode. Was supposed to follow-up with cardiology and neurology but did not. Currently without a complaints with the exception of some mild crampy abdominal pain. No chest pain or shortness of breath. No back pain. No urinary complaints. No other associated symptoms. Nursing Notes were reviewed. Including nursing noted for FM, Surgical History, Past Medical History, Social History, vitals, and allergies; agree with all. REVIEW OF SYSTEMS       Review of Systems   Constitutional:  Negative for diaphoresis and unexpected weight change. HENT:  Negative for congestion and dental problem. Eyes:  Negative for discharge and itching. Respiratory:  Negative for cough and shortness of breath. Cardiovascular:  Negative for chest pain and leg swelling. Gastrointestinal:  Negative for abdominal pain, constipation and vomiting. Endocrine: Negative for cold intolerance and heat intolerance. Genitourinary:  Negative for vaginal bleeding, vaginal discharge and vaginal pain. Musculoskeletal:  Negative for neck pain and neck stiffness.    Skin:  Negative for color change and pallor. Neurological:  Positive for syncope. Negative for tremors and weakness. Psychiatric/Behavioral:  Negative for agitation and behavioral problems. Except as noted above the remainder of the review of systems was reviewed and negative. PAST MEDICAL HISTORY     Past Medical History:   Diagnosis Date    ADHD (attention deficit hyperactivity disorder) 1988    Depression with anxiety     Diabetes mellitus ([address]     pre-diabetes    Difficult intubation     airway swelled up    Encounter for imaging to screen for metal prior to MRI 2021    MRI Conditional Medtronic Non-Programmable shunt model#[id number] implanted 10/30/2020 at [facility]. Normal Mode. 1.5T or 3.0T. ESBL (extended spectrum beta-lactamase) producing bacteria infection 2019    urine    Functional ovarian cysts 2008    rt ovary cyst x 2 yrs.     Headache(784.0)     migraines    History of blood transfusion     at birth    History of kidney stones     History of PCOS     had hysterectomy in 2016    Hydrocephalus [facility])     Hyperlipidemia     Irritable bowel syndrome 2004    had colonoscopy about 6 yrs ago    Meningitis     Neutrophilic leukocytosis     Nicotine dependence     PONV (postoperative nausea and vomiting)     very nauseated and sometimes wakes up with a Migraine--happened once after brain surgery    Primary osteoarthritis of left knee 2016    S/P cone biopsy of cervix 2004    Scoliosis .s    Seizures ([address]     twice--last one in 2022     (ventriculoperitoneal) shunt status     hydrocephalus f/w Neurosurgeon at [facility]    Wears glasses     reading       SURGICAL HISTORY       Past Surgical History:   Procedure Laterality Date    ABDOMEN SURGERY N/A 2021    REMOVAL OF ABDOMINAL WALL MASS performed by Aracely Okeefe MD at [facility]      appendicitis     SECTION  2005    placenta previa    CHOLECYSTECTOMY      COLONOSCOPY  2004 COLPOSCOPY  2004    CSF SHUNT      replaced at age 17    CYSTOSCOPY      stone removal    HEMORRHOID SURGERY      HERNIA REPAIR Bilateral 1988    inguinal    HERNIA REPAIR  2015    hiatal hernia    HYSTERECTOMY, TOTAL ABDOMINAL (CERVIX REMOVED)  11/09/2016    TAHBSO, adhesions/PCOS    KNEE ARTHROSCOPY Left 1/7/2015    medial meniscectomy, chondroplasty, plica resection    LITHOTRIPSY Bilateral 12/10/2019    OTHER SURGICAL HISTORY  10/19/2016    op lap    VENTRAL HERNIA REPAIR N/A 9/14/2022    LAPAROSCOPIC LYSIS OF ADHESIONS AND ABDOMINAL WALL MASS REMOVAL performed by Renée Toth MD at [place] [address]      multiple revisions, most recent 2015       CURRENT MEDICATIONS       Discharge Medication List as of 10/25/2022  4:48 AM        CONTINUE these medications which have NOT CHANGED    Details   ondansetron (ZOFRAN ODT) 4 MG disintegrating tablet Take 1 tablet by mouth every 12 hours as needed for Nausea, Disp-12 tablet, R-1Print      lisdexamfetamine (VYVANSE) 50 MG capsule Take 1 capsule by mouth every morning for 7 days. , Disp-7 capsule, R-0Normal      oxyCODONE (ROXICODONE) 5 MG immediate release tablet Take 5 mg by mouth every 6 hours as needed for Pain. Historical Med      hydrOXYzine HCl (ATARAX) 25 MG tablet Take 25 mg by mouth 3 times daily as needed for ItchingHistorical Med      albuterol sulfate HFA (VENTOLIN HFA) 108 (90 Base) MCG/ACT inhaler Inhale 2 puffs into the lungs 4 times daily as needed for Wheezing, Disp-54 g, R-1Normal      gabapentin (NEURONTIN) 400 MG capsule Take 400 mg by mouth 3 times daily. Historical Med             ALLERGIES     Bee venom, Bentyl [dicyclomine hcl], Dicyclomine, Ketorolac tromethamine, Levofloxacin, Maitake, Shiitake mushroom, Sulfa antibiotics, Vancomycin, Zosyn [piperacillin sod-tazobactam so], Adhesive tape, Oxycodone-acetaminophen, Sulfacetamide, Acetaminophen, Butalbital-apap-caff-cod, Ceftaroline, Daptomycin, Fosfomycin tromethamine, Normocephalic and atraumatic. Right Ear: External ear normal.      Left Ear: External ear normal.   Eyes:      General:         Right eye: No discharge. Left eye: No discharge. Conjunctiva/sclera: Conjunctivae normal.      Pupils: Pupils are equal, round, and reactive to light. Cardiovascular:      Rate and Rhythm: Normal rate and regular rhythm. Heart sounds: No murmur heard. Pulmonary:      Effort: Pulmonary effort is normal. No respiratory distress. Breath sounds: Normal breath sounds. No wheezing or rales. Abdominal:      General: Bowel sounds are normal. There is no distension. Palpations: Abdomen is soft. There is no mass. Tenderness: There is no abdominal tenderness. There is no guarding or rebound. Genitourinary:     Comments: Deferred  Musculoskeletal:         General: No deformity. Normal range of motion. Cervical back: Normal range of motion and neck supple. Skin:     General: Skin is warm. Findings: No erythema or rash. Neurological:      Mental Status: She is alert and oriented to person, place, and time. She is not disoriented. Cranial Nerves: No cranial nerve deficit. Motor: No atrophy or abnormal muscle tone. Coordination: Coordination normal.   Psychiatric:         Behavior: Behavior normal.         Thought Content: Thought content normal.       DIAGNOSTIC RESULTS     RADIOLOGY:   Non-plain film images such as CT, Ultrasoundand MRI are read by the radiologist. Plain radiographic images are visualized and preliminarily interpreted by the emergency physician with the below findings:    Impression   1. Lungs are clear with no acute pulmonary process. 2.  No acute process is seen within the abdomen. There is a nonobstructing   right renal calculus. 3.  Ventriculoperitoneal shunt is acceptable without fragmentation. There is   no abnormal collection along the course.        4.  Some scarring/tract in the anterior pelvic wall fat is less prominent. No focal collection at the site. ED BEDSIDE ULTRASOUND:   Performed by ED Physician - none    LABS:  Labs Reviewed   CBC WITH AUTO DIFFERENTIAL - Abnormal; Notable for the following components:       Result Value    WBC 11.2 (*)     All other components within normal limits   BASIC METABOLIC PANEL W/ REFLEX TO MG FOR LOW K - Abnormal; Notable for the following components:    Glucose 137 (*)     All other components within normal limits   HEPATIC FUNCTION PANEL - Abnormal; Notable for the following components:    AST 12 (*)     All other components within normal limits   POCT GLUCOSE - Abnormal; Notable for the following components:    POC Glucose 129 (*)     All other components within normal limits   LACTIC ACID   CK   HCG, SERUM, QUALITATIVE   TROPONIN   BRAIN NATRIURETIC PEPTIDE   LIPASE       All other labs were withinnormal range or not returned as of this dictation. EMERGENCY DEPARTMENT COURSE and DIFFERENTIAL DIAGNOSIS/MDM:     PMH, Surgical Hx, FH, Social Hx reviewed by myself (ETOH usage, Tobacco usage, Drug usage reviewed by myself, no pertinent Hx)- No Pertinent Hx     Old records were reviewed by me     MDM 51-year-old with syncope versus seizure. Well-appearing nontoxic. Concern for syncope versus seizure. Labs imaging reassuring. Well-appearing nontoxic. Outpatient follow-up. Cannot drive until cleared by PCP and or cardiology and neurology. Labs-   Diagnostic findings  Medications  Consults  Disposition  I estimate there is LOW risk for Sepsis, MI, Stroke, Tamponade, PTX, Toxicity or other life threatening etiology thus I consider the discharge disposition reasonable. The patient is at low risk for mortality based on demographic, history and clinical factors. Given the best available information and clinical assessment, I estimate the risk of hospitalization to be greater than risk of treatment at home.  I have explained to the patient that the risk could rapidly change, given precautions for return and instructions. Explained to patient that the risk for mortality is low based on demographic, history and clinical factors. I discussed with patient the results of evaluation in the ED, diagnosis, care, and prognosis. The plan is to discharge to home. Patient is in agreement with plan and questions have been answered. I also discussed with patient the reasons which may require a return visit and the importance of follow-up care. The patient is well-appearing, nontoxic, and improved at the time of discharge. Patient agrees to call to arrange follow-up care as directed. Patient understands to return immediately for worsening/change in symptoms. I PERSONALLY SAW THE PATIENT AND PERFORMED A SUBSTANTIVE PORTION OF THE VISIT INCLUDING ALL ASPECTS OF THE MEDICAL DECISION MAKING PROCESS. The primary clinician of record [employer]   Total Critical Caretime was 21 minutes, excluding separately reportable procedures. There was a high probability of clinically significant/life threatening deterioration in the patient's condition which required my urgent intervention. CRITICAL CARE  I personally saw the patient and independently provided 21 minutes of non-concurrent critical care out of the total shared critical care time provided. This excludes seperately billable procedures. Critical care time was provided for patient as above that required close evaluation and/or intervention with concern for potential patient decompensation. PROCEDURES:  Unlessotherwise noted below, none    FINAL IMPRESSION      1.  Syncope and collapse          DISPOSITION/PLAN   DISPOSITION Decision To Discharge 10/25/2022 04:35:39 AM    PATIENT REFERRED TO:  Joesph Hargrove, FREDRICK - CNP  [address]            DISCHARGE MEDICATIONS:  Discharge Medication List as of 10/25/2022  4:48 AM (Please note that portions ofthis note were completed with a voice recognition program.  Efforts were made to edit the dictations but occasionally words are mis-transcribed.)    Dottie Aguilar MD(electronically signed)  Attending Emergency Physician        Dottie Aguilar MD  10/25/22 9365

## 2022-10-25 NOTE — ED NOTES
Provider order placed for patient's discharge. Provider reviewed decision to discharge with the patient. Discharge paperwork and any prescriptions were reviewed with the patient. Patient verbalized understanding of discharge education and any prescriptions and has no further questions or further needs at this time. Patient left with all personal belongings and was stable upon departure. Patient thanked for choosing [facility] ([facility]) and informed to return should any need arise.        El Peterson RN  10/25/22 8653

## 2022-10-27 ENCOUNTER — HOSPITAL ENCOUNTER (INPATIENT)
Age: 42
LOS: 2 days | Discharge: HOME | DRG: 053 | End: 2022-10-29
Attending: EMERGENCY MEDICINE | Admitting: INTERNAL MEDICINE
Payer: COMMERCIAL

## 2022-10-27 ENCOUNTER — HOSPITAL ENCOUNTER (EMERGENCY)
Age: 42
Discharge: HOME | End: 2022-10-27
Payer: COMMERCIAL

## 2022-10-27 VITALS
DIASTOLIC BLOOD PRESSURE: 79 MMHG | WEIGHT: 160.5 LBS | HEIGHT: 59 IN | TEMPERATURE: 98 F | HEART RATE: 90 BPM | OXYGEN SATURATION: 96 % | SYSTOLIC BLOOD PRESSURE: 112 MMHG | RESPIRATION RATE: 15 BRPM | BODY MASS INDEX: 32.36 KG/M2

## 2022-10-27 DIAGNOSIS — Z98.2 S/P VP SHUNT: ICD-10-CM

## 2022-10-27 DIAGNOSIS — R56.9 SEIZURE-LIKE ACTIVITY (HCC): Primary | ICD-10-CM

## 2022-10-27 DIAGNOSIS — Q03.9 CONGENITAL HYDROCEPHALUS (HCC): Chronic | ICD-10-CM

## 2022-10-27 DIAGNOSIS — R56.9 CONVULSIONS, UNSPECIFIED CONVULSION TYPE (HCC): ICD-10-CM

## 2022-10-27 LAB
A/G RATIO: 1.5 (ref 1.1–2.2)
ALBUMIN SERPL-MCNC: 4.4 G/DL (ref 3.4–5)
ALP BLD-CCNC: 113 U/L (ref 40–129)
ALT SERPL-CCNC: 19 U/L (ref 10–40)
ANION GAP SERPL CALCULATED.3IONS-SCNC: 11 MMOL/L (ref 3–16)
ANION GAP SERPL CALCULATED.3IONS-SCNC: 14 MMOL/L (ref 3–16)
AST SERPL-CCNC: 17 U/L (ref 15–37)
BASOPHILS ABSOLUTE: 0.1 K/UL (ref 0–0.2)
BASOPHILS ABSOLUTE: 0.1 K/UL (ref 0–0.2)
BASOPHILS RELATIVE PERCENT: 1 %
BASOPHILS RELATIVE PERCENT: 1 %
BILIRUB SERPL-MCNC: 0.5 MG/DL (ref 0–1)
BILIRUBIN URINE: NEGATIVE
BLOOD, URINE: NEGATIVE
BUN BLDV-MCNC: 10 MG/DL (ref 7–20)
BUN BLDV-MCNC: 9 MG/DL (ref 7–20)
CALCIUM SERPL-MCNC: 9.3 MG/DL (ref 8.3–10.6)
CALCIUM SERPL-MCNC: 9.4 MG/DL (ref 8.3–10.6)
CHLORIDE BLD-SCNC: 105 MMOL/L (ref 99–110)
CHLORIDE BLD-SCNC: 106 MMOL/L (ref 99–110)
CLARITY: CLEAR
CO2: 21 MMOL/L (ref 21–32)
CO2: 23 MMOL/L (ref 21–32)
COLOR: YELLOW
CREAT SERPL-MCNC: 0.6 MG/DL (ref 0.6–1.1)
CREAT SERPL-MCNC: 0.7 MG/DL (ref 0.6–1.1)
EKG ATRIAL RATE: 81 BPM
EKG DIAGNOSIS: NORMAL
EKG P AXIS: 41 DEGREES
EKG P-R INTERVAL: 134 MS
EKG Q-T INTERVAL: 406 MS
EKG QRS DURATION: 82 MS
EKG QTC CALCULATION (BAZETT): 471 MS
EKG R AXIS: 24 DEGREES
EKG T AXIS: 17 DEGREES
EKG VENTRICULAR RATE: 81 BPM
EOSINOPHILS ABSOLUTE: 0.2 K/UL (ref 0–0.6)
EOSINOPHILS ABSOLUTE: 0.2 K/UL (ref 0–0.6)
EOSINOPHILS RELATIVE PERCENT: 1.6 %
EOSINOPHILS RELATIVE PERCENT: 1.8 %
GFR SERPL CREATININE-BSD FRML MDRD: >60 ML/MIN/{1.73_M2}
GFR SERPL CREATININE-BSD FRML MDRD: >60 ML/MIN/{1.73_M2}
GLUCOSE BLD-MCNC: 107 MG/DL (ref 70–99)
GLUCOSE BLD-MCNC: 108 MG/DL (ref 70–99)
GLUCOSE URINE: NEGATIVE MG/DL
GONADOTROPIN, CHORIONIC (HCG) QUANT: <5 MIU/ML
HCT VFR BLD CALC: 42.9 % (ref 36–48)
HCT VFR BLD CALC: 43 % (ref 36–48)
HEMOGLOBIN: 14.1 G/DL (ref 12–16)
HEMOGLOBIN: 14.6 G/DL (ref 12–16)
KETONES, URINE: NEGATIVE MG/DL
LACTIC ACID: 0.9 MMOL/L (ref 0.4–2)
LACTIC ACID: 1.2 MMOL/L (ref 0.4–2)
LEUKOCYTE ESTERASE, URINE: NEGATIVE
LYMPHOCYTES ABSOLUTE: 2.4 K/UL (ref 1–5.1)
LYMPHOCYTES ABSOLUTE: 2.5 K/UL (ref 1–5.1)
LYMPHOCYTES RELATIVE PERCENT: 22.1 %
LYMPHOCYTES RELATIVE PERCENT: 28.6 %
MAGNESIUM: 2.4 MG/DL (ref 1.8–2.4)
MCH RBC QN AUTO: 28.1 PG (ref 26–34)
MCH RBC QN AUTO: 28.3 PG (ref 26–34)
MCHC RBC AUTO-ENTMCNC: 32.8 G/DL (ref 31–36)
MCHC RBC AUTO-ENTMCNC: 34.1 G/DL (ref 31–36)
MCV RBC AUTO: 83 FL (ref 80–100)
MCV RBC AUTO: 85.6 FL (ref 80–100)
MICROSCOPIC EXAMINATION: NORMAL
MONOCYTES ABSOLUTE: 0.4 K/UL (ref 0–1.3)
MONOCYTES ABSOLUTE: 0.8 K/UL (ref 0–1.3)
MONOCYTES RELATIVE PERCENT: 4.8 %
MONOCYTES RELATIVE PERCENT: 6.8 %
NEUTROPHILS ABSOLUTE: 5.3 K/UL (ref 1.7–7.7)
NEUTROPHILS ABSOLUTE: 7.8 K/UL (ref 1.7–7.7)
NEUTROPHILS RELATIVE PERCENT: 63.8 %
NEUTROPHILS RELATIVE PERCENT: 68.5 %
NITRITE, URINE: NEGATIVE
PDW BLD-RTO: 15.1 % (ref 12.4–15.4)
PDW BLD-RTO: 15.4 % (ref 12.4–15.4)
PH UA: 6 (ref 5–8)
PLATELET # BLD: 289 K/UL (ref 135–450)
PLATELET # BLD: 304 K/UL (ref 135–450)
PMV BLD AUTO: 7.5 FL (ref 5–10.5)
PMV BLD AUTO: 7.9 FL (ref 5–10.5)
POTASSIUM REFLEX MAGNESIUM: 3.4 MMOL/L (ref 3.5–5.1)
POTASSIUM SERPL-SCNC: 3.6 MMOL/L (ref 3.5–5.1)
PROTEIN UA: NEGATIVE MG/DL
RBC # BLD: 5.03 M/UL (ref 4–5.2)
RBC # BLD: 5.17 M/UL (ref 4–5.2)
SODIUM BLD-SCNC: 140 MMOL/L (ref 136–145)
SODIUM BLD-SCNC: 140 MMOL/L (ref 136–145)
SPECIFIC GRAVITY UA: 1.02 (ref 1–1.03)
TOTAL CK: 106 U/L (ref 26–192)
TOTAL PROTEIN: 7.4 G/DL (ref 6.4–8.2)
URINE REFLEX TO CULTURE: NORMAL
URINE TYPE: NORMAL
UROBILINOGEN, URINE: 0.2 E.U./DL
WBC # BLD: 11.4 K/UL (ref 4–11)
WBC # BLD: 8.3 K/UL (ref 4–11)

## 2022-10-27 PROCEDURE — 6370000000 HC RX 637 (ALT 250 FOR IP): Performed by: EMERGENCY MEDICINE

## 2022-10-27 PROCEDURE — 81003 URINALYSIS AUTO W/O SCOPE: CPT

## 2022-10-27 PROCEDURE — 82550 ASSAY OF CK (CPK): CPT

## 2022-10-27 PROCEDURE — 85025 COMPLETE CBC W/AUTO DIFF WBC: CPT

## 2022-10-27 PROCEDURE — 84702 CHORIONIC GONADOTROPIN TEST: CPT

## 2022-10-27 PROCEDURE — 6370000000 HC RX 637 (ALT 250 FOR IP): Performed by: NURSE PRACTITIONER

## 2022-10-27 PROCEDURE — 6360000002 HC RX W HCPCS: Performed by: NURSE PRACTITIONER

## 2022-10-27 PROCEDURE — 83605 ASSAY OF LACTIC ACID: CPT

## 2022-10-27 PROCEDURE — 6360000002 HC RX W HCPCS: Performed by: INTERNAL MEDICINE

## 2022-10-27 PROCEDURE — 36415 COLL VENOUS BLD VENIPUNCTURE: CPT

## 2022-10-27 PROCEDURE — 80048 BASIC METABOLIC PNL TOTAL CA: CPT

## 2022-10-27 PROCEDURE — 6370000000 HC RX 637 (ALT 250 FOR IP): Performed by: INTERNAL MEDICINE

## 2022-10-27 PROCEDURE — APPNB60 APP NON BILLABLE TIME 46-60 MINS

## 2022-10-27 PROCEDURE — 99284 EMERGENCY DEPT VISIT MOD MDM: CPT

## 2022-10-27 PROCEDURE — 99285 EMERGENCY DEPT VISIT HI MDM: CPT

## 2022-10-27 PROCEDURE — 83735 ASSAY OF MAGNESIUM: CPT

## 2022-10-27 PROCEDURE — 80053 COMPREHEN METABOLIC PANEL: CPT

## 2022-10-27 PROCEDURE — 93005 ELECTROCARDIOGRAM TRACING: CPT | Performed by: EMERGENCY MEDICINE

## 2022-10-27 PROCEDURE — 1200000000 HC SEMI PRIVATE

## 2022-10-27 PROCEDURE — 96374 THER/PROPH/DIAG INJ IV PUSH: CPT

## 2022-10-27 PROCEDURE — 2580000003 HC RX 258: Performed by: INTERNAL MEDICINE

## 2022-10-27 RX ORDER — OXYCODONE HYDROCHLORIDE 5 MG/1
5 TABLET ORAL EVERY 6 HOURS PRN
Status: DISCONTINUED | OUTPATIENT
Start: 2022-10-27 | End: 2022-10-29 | Stop reason: HOSPADM

## 2022-10-27 RX ORDER — DIAZEPAM 5 MG/ML
5 INJECTION, SOLUTION INTRAMUSCULAR; INTRAVENOUS
Status: COMPLETED | OUTPATIENT
Start: 2022-10-27 | End: 2022-10-28

## 2022-10-27 RX ORDER — PROMETHAZINE HYDROCHLORIDE 12.5 MG/1
12.5 TABLET ORAL EVERY 6 HOURS PRN
Status: DISCONTINUED | OUTPATIENT
Start: 2022-10-27 | End: 2022-10-29 | Stop reason: HOSPADM

## 2022-10-27 RX ORDER — SODIUM CHLORIDE 0.9 % (FLUSH) 0.9 %
5-40 SYRINGE (ML) INJECTION PRN
Status: DISCONTINUED | OUTPATIENT
Start: 2022-10-27 | End: 2022-10-29 | Stop reason: HOSPADM

## 2022-10-27 RX ORDER — ONDANSETRON 4 MG/1
4 TABLET, ORALLY DISINTEGRATING ORAL ONCE
Status: COMPLETED | OUTPATIENT
Start: 2022-10-27 | End: 2022-10-27

## 2022-10-27 RX ORDER — ONDANSETRON 2 MG/ML
4 INJECTION INTRAMUSCULAR; INTRAVENOUS EVERY 6 HOURS PRN
Status: DISCONTINUED | OUTPATIENT
Start: 2022-10-27 | End: 2022-10-29 | Stop reason: HOSPADM

## 2022-10-27 RX ORDER — POLYETHYLENE GLYCOL 3350 17 G/17G
17 POWDER, FOR SOLUTION ORAL DAILY PRN
Status: DISCONTINUED | OUTPATIENT
Start: 2022-10-27 | End: 2022-10-29 | Stop reason: HOSPADM

## 2022-10-27 RX ORDER — GABAPENTIN 400 MG/1
400 CAPSULE ORAL 3 TIMES DAILY
Status: DISCONTINUED | OUTPATIENT
Start: 2022-10-27 | End: 2022-10-29 | Stop reason: HOSPADM

## 2022-10-27 RX ORDER — DIPHENHYDRAMINE HCL 25 MG
25 TABLET ORAL EVERY 6 HOURS PRN
Status: COMPLETED | OUTPATIENT
Start: 2022-10-27 | End: 2022-10-28

## 2022-10-27 RX ORDER — SODIUM CHLORIDE 9 MG/ML
INJECTION, SOLUTION INTRAVENOUS PRN
Status: DISCONTINUED | OUTPATIENT
Start: 2022-10-27 | End: 2022-10-29 | Stop reason: HOSPADM

## 2022-10-27 RX ORDER — SODIUM CHLORIDE 0.9 % (FLUSH) 0.9 %
5-40 SYRINGE (ML) INJECTION EVERY 12 HOURS SCHEDULED
Status: DISCONTINUED | OUTPATIENT
Start: 2022-10-27 | End: 2022-10-29 | Stop reason: HOSPADM

## 2022-10-27 RX ORDER — ENOXAPARIN SODIUM 100 MG/ML
40 INJECTION SUBCUTANEOUS DAILY
Status: DISCONTINUED | OUTPATIENT
Start: 2022-10-27 | End: 2022-10-29 | Stop reason: HOSPADM

## 2022-10-27 RX ORDER — OXYCODONE HYDROCHLORIDE 5 MG/1
5 TABLET ORAL ONCE
Status: COMPLETED | OUTPATIENT
Start: 2022-10-27 | End: 2022-10-27

## 2022-10-27 RX ORDER — LORAZEPAM 2 MG/ML
1 INJECTION INTRAMUSCULAR ONCE
Status: COMPLETED | OUTPATIENT
Start: 2022-10-27 | End: 2022-10-27

## 2022-10-27 RX ADMIN — ONDANSETRON 4 MG: 4 TABLET, ORALLY DISINTEGRATING ORAL at 17:04

## 2022-10-27 RX ADMIN — DIPHENHYDRAMINE HYDROCHLORIDE 25 MG: 25 TABLET ORAL at 22:12

## 2022-10-27 RX ADMIN — ENOXAPARIN SODIUM 40 MG: 100 INJECTION SUBCUTANEOUS at 20:47

## 2022-10-27 RX ADMIN — OXYCODONE 5 MG: 5 TABLET ORAL at 20:49

## 2022-10-27 RX ADMIN — OXYCODONE 5 MG: 5 TABLET ORAL at 17:03

## 2022-10-27 RX ADMIN — SODIUM CHLORIDE, PRESERVATIVE FREE 10 ML: 5 INJECTION INTRAVENOUS at 20:56

## 2022-10-27 RX ADMIN — ONDANSETRON 4 MG: 4 TABLET, ORALLY DISINTEGRATING ORAL at 01:55

## 2022-10-27 RX ADMIN — OXYCODONE 5 MG: 5 TABLET ORAL at 02:09

## 2022-10-27 RX ADMIN — LORAZEPAM 1 MG: 2 INJECTION INTRAMUSCULAR; INTRAVENOUS at 02:56

## 2022-10-27 RX ADMIN — GABAPENTIN 400 MG: 400 CAPSULE ORAL at 20:47

## 2022-10-27 ASSESSMENT — PAIN DESCRIPTION - ONSET
ONSET: ON-GOING
ONSET: ON-GOING

## 2022-10-27 ASSESSMENT — LIFESTYLE VARIABLES: HOW OFTEN DO YOU HAVE A DRINK CONTAINING ALCOHOL: NEVER

## 2022-10-27 ASSESSMENT — PAIN DESCRIPTION - PAIN TYPE: TYPE: ACUTE PAIN;CHRONIC PAIN

## 2022-10-27 ASSESSMENT — PAIN SCALES - GENERAL
PAINLEVEL_OUTOF10: 7
PAINLEVEL_OUTOF10: 0
PAINLEVEL_OUTOF10: 8
PAINLEVEL_OUTOF10: 8
PAINLEVEL_OUTOF10: 9

## 2022-10-27 ASSESSMENT — PAIN DESCRIPTION - ORIENTATION
ORIENTATION: LEFT
ORIENTATION: LEFT
ORIENTATION: MID

## 2022-10-27 ASSESSMENT — PAIN DESCRIPTION - LOCATION
LOCATION: NECK;HEAD
LOCATION: HEAD
LOCATION: HEAD;NECK

## 2022-10-27 ASSESSMENT — ENCOUNTER SYMPTOMS: SHORTNESS OF BREATH: 0

## 2022-10-27 ASSESSMENT — PAIN - FUNCTIONAL ASSESSMENT
PAIN_FUNCTIONAL_ASSESSMENT: NONE - DENIES PAIN
PAIN_FUNCTIONAL_ASSESSMENT: PREVENTS OR INTERFERES SOME ACTIVE ACTIVITIES AND ADLS

## 2022-10-27 ASSESSMENT — PAIN DESCRIPTION - DESCRIPTORS
DESCRIPTORS: PRESSURE
DESCRIPTORS: PRESSURE
DESCRIPTORS: ACHING

## 2022-10-27 ASSESSMENT — PAIN DESCRIPTION - FREQUENCY: FREQUENCY: INTERMITTENT

## 2022-10-27 NOTE — DISCHARGE INSTRUCTIONS
Return to the emergency department for new or worsening symptoms including, not limited to, developing severe headache, fever, chills, sweats, inability tolerate food or drink, other symptoms/concerns. Follow-up with their PCP for reevaluation next 1-2 days.

## 2022-10-27 NOTE — ED PROVIDER NOTES
[address] [facility] [address] ENCOUNTER          ATTENDING PHYSICIAN NOTE       Date of evaluation: 10/27/2022    ADDENDUM:      Care of this patient was assumed from Dr. Jazmine Valdovinos. The patient was seen for Seizures  . The patient's initial evaluation and plan have been discussed with the prior provider who initially evaluated the patient. Nursing Notes, Past Medical Hx, Past Surgical Hx, Social Hx, Allergies, and Family Hx were all reviewed.     Diagnostic Results         RADIOLOGY:  MRI BRAIN W WO CONTRAST    (Results Pending)       LABS:   Results for orders placed or performed during the hospital encounter of 10/27/22   CBC with Auto Differential   Result Value Ref Range    WBC 8.3 4.0 - 11.0 K/uL    RBC 5.03 4.00 - 5.20 M/uL    Hemoglobin 14.1 12.0 - 16.0 g/dL    Hematocrit 43.0 36.0 - 48.0 %    MCV 85.6 80.0 - 100.0 fL    MCH 28.1 26.0 - 34.0 pg    MCHC 32.8 31.0 - 36.0 g/dL    RDW 15.4 12.4 - 15.4 %    Platelets 783 520 - 184 K/uL    MPV 7.9 5.0 - 10.5 fL    Neutrophils % 63.8 %    Lymphocytes % 28.6 %    Monocytes % 4.8 %    Eosinophils % 1.8 %    Basophils % 1.0 %    Neutrophils Absolute 5.3 1.7 - 7.7 K/uL    Lymphocytes Absolute 2.4 1.0 - 5.1 K/uL    Monocytes Absolute 0.4 0.0 - 1.3 K/uL    Eosinophils Absolute 0.2 0.0 - 0.6 K/uL    Basophils Absolute 0.1 0.0 - 0.2 K/uL   Basic Metabolic Panel   Result Value Ref Range    Sodium 140 136 - 145 mmol/L    Potassium 3.6 3.5 - 5.1 mmol/L    Chloride 106 99 - 110 mmol/L    CO2 23 21 - 32 mmol/L    Anion Gap 11 3 - 16    Glucose 107 (H) 70 - 99 mg/dL    BUN 10 7 - 20 mg/dL    Creatinine 0.7 0.6 - 1.1 mg/dL    Est, Glom Filt Rate >60 >60    Calcium 9.3 8.3 - 10.6 mg/dL   HCG, Quantitative, Pregnancy   Result Value Ref Range    hCG Quant <5.0 <5.0 mIU/mL   Lactic Acid   Result Value Ref Range    Lactic Acid 1.2 0.4 - 2.0 mmol/L   Urinalysis with Reflex to Culture    Specimen: Urine   Result Value Ref Range    Color, UA Yellow Straw/Yellow    Clarity, UA Clear Clear    Glucose, Ur Negative Negative mg/dL    Bilirubin Urine Negative Negative    Ketones, Urine Negative Negative mg/dL    Specific Gravity, UA 1.020 1.005 - 1.030    Blood, Urine Negative Negative    pH, UA 6.0 5.0 - 8.0    Protein, UA Negative Negative mg/dL    Urobilinogen, Urine 0.2 <2.0 E.U./dL    Nitrite, Urine Negative Negative    Leukocyte Esterase, Urine Negative Negative    Microscopic Examination Not Indicated     Urine Type NotGiven     Urine Reflex to Culture Not Indicated    EKG 12 Lead   Result Value Ref Range    Ventricular Rate 81 BPM    Atrial Rate 81 BPM    P-R Interval 134 ms    QRS Duration 82 ms    Q-T Interval 406 ms    QTc Calculation (Bazett) 471 ms    P Axis 41 degrees    R Axis 24 degrees    T Axis 17 degrees    Diagnosis       EKG performed in ER and to be interpreted by ER physician. Confirmed by MD, ER ([id number]),  Leida Hernández ([id number]) on 10/27/2022 2:31:58 PM       RECENT VITALS:  BP: 119/87, Temp: 99 °F (37.2 °C), Heart Rate: 88, Resp: 22, SpO2: 100 %     Procedures         ED Course          The patient was given the following medications:  Orders Placed This Encounter   Medications    oxyCODONE (ROXICODONE) immediate release tablet 5 mg    ondansetron (ZOFRAN-ODT) disintegrating tablet 4 mg    diazePAM (VALIUM) injection 5 mg       CONSULTS:  IP CONSULT TO NEUROLOGY  IP CONSULT TO NEUROLOGY  IP CONSULT TO HOSPITALIST    MEDICAL DECISION Sonja Bains / Nicolette Gallardo / Nia Fulton is a 38 y.o. female presenting with a complaint of recurrent seizure-like activity, and left-sided headache in the setting of congenital hydrocephalus. Reportedly had shunt series yesterday that was unremarkable. She was signed out to me pending neurology consult recommendations.   They have recommended a routine EEG, and MRI with and without contrast.    The studies not be resulted until the morning and so I discussed with a neurologist in the hospital so the patient be placed under observation and admitted to the hospitalist service. All parties agreed this was most reasonable plan for her patient was informed of this, and had a normal mental status throughout her ED stay with no visualized seizure-like spells. She received home doses of her oxycodone and Zofran for pain due to multiple medication allergies. .      Clinical Impression     1. Convulsions, unspecified convulsion type ([facility] [address]        Disposition     PATIENT REFERRED TO:  No follow-up provider specified.     DISCHARGE MEDICATIONS:  New Prescriptions    No medications on file       DISPOSITION Admitted 10/27/2022 06:51:30 PM         Jovana Chaudhary MD  10/27/22 [id number]

## 2022-10-27 NOTE — ED PROVIDER NOTES
[address]  [address] [address]  Dept: [phone]  Loc: [address] ENCOUNTER        This patient was not seen or evaluated by the attending physician. I evaluated this patient, the attending physician was available for consultation. CHIEF COMPLAINT    Chief Complaint   Patient presents with    Seizures     Pt came in with a possible seizure. Pt states she doesn't remember what happened tonight she \"woke up in an ambulance\"       CLINT Whitaker is a 38 y.o. nontoxic, well-appearing, mildly distressed female who presents with a seizure that occurred shortly prior to arrival.  The seizure was witnessed by the patient's 59-year-old daughter the quality of the seizure was reported clonic tonic. The duration of the seizure was approximately 30 seconds. The postictal period was brief. The patient has no recollection of what happened but states she \"woke up in an ambulance\". Since the seizure, the patient has had associated nausea. The patient endorses \"pressure\" 8/10 headache has been present since yesterday. Patient was seen twice on yesterday for seizure-like activity. She had a negative CT head and a negative XR shunt series placement study as she is status post  shunt placement. Denies that the headache is worst of her life, visual changes, neck pain/stiffness, chest pain, shortness of breath, presyncope, or other symptoms/concerns. Patient is not on seizure medications. Review of Systems   Constitutional:  Negative for chills, diaphoresis, fatigue and fever. HENT:  Negative for congestion and sore throat. Eyes:  Negative for pain and visual disturbance. Respiratory:  Negative for cough and shortness of breath. Cardiovascular:  Negative for chest pain and leg swelling. Gastrointestinal:  Positive for nausea.  Negative for abdominal pain, anal bleeding and vomiting. Genitourinary:  Negative for difficulty urinating, dysuria, frequency and urgency. Musculoskeletal:  Negative for back pain, neck pain and neck stiffness. Skin:  Negative for rash and wound. Neurological:  Positive for seizures (+ Seizure-like activity witnessed by daughter). Negative for dizziness and light-headedness. Hematological: Negative. Psychiatric/Behavioral: Negative. PAST MEDICAL OR SURGICAL HISTORY    Past Medical History:   Diagnosis Date    ADHD (attention deficit hyperactivity disorder) 1988    Depression with anxiety     Diabetes mellitus ([address]     pre-diabetes    Difficult intubation     airway swelled up    Encounter for imaging to screen for metal prior to MRI 06/01/2021    MRI Conditional Medtronic Non-Programmable shunt model#00638 implanted 10/30/2020 at [facility]. Normal Mode. 1.5T or 3.0T. ESBL (extended spectrum beta-lactamase) producing bacteria infection 11/06/2019    urine    Functional ovarian cysts 2008    rt ovary cyst x 2 yrs.     Headache(784.0)     migraines    History of blood transfusion     at birth    History of kidney stones     History of PCOS     had hysterectomy in 2016    Hydrocephalus [facility])     Hyperlipidemia     Irritable bowel syndrome 2004    had colonoscopy about 6 yrs ago    Meningitis 58/8769    Neutrophilic leukocytosis     Nicotine dependence     PONV (postoperative nausea and vomiting)     very nauseated and sometimes wakes up with a Migraine--happened once after brain surgery    Primary osteoarthritis of left knee 07/01/2016    S/P cone biopsy of cervix 2004    Scoliosis 1990.s    Seizures ([address]     twice--last one in June2022     (ventriculoperitoneal) shunt status 1982    hydrocephalus f/w Neurosurgeon at [facility]    Wears glasses     reading     Past Surgical History:   Procedure Laterality Date    ABDOMEN SURGERY N/A 7/14/2021    REMOVAL OF ABDOMINAL WALL MASS performed by Celine Goetz MD at [place] [address] APPENDECTOMY  2003    appendicitis     SECTION      placenta previa    CHOLECYSTECTOMY      COLONOSCOPY  2004    COLPOSCOPY  2004    CSF SHUNT      replaced at age 17    CYSTOSCOPY      stone removal    HEMORRHOID SURGERY      HERNIA REPAIR Bilateral     inguinal    HERNIA REPAIR      hiatal hernia    HYSTERECTOMY, TOTAL ABDOMINAL (CERVIX REMOVED)  2016    TAHBSO, adhesions/PCOS    KNEE ARTHROSCOPY Left 2015    medial meniscectomy, chondroplasty, plica resection    LITHOTRIPSY Bilateral 12/10/2019    OTHER SURGICAL HISTORY  10/19/2016    op lap    VENTRAL HERNIA REPAIR N/A 2022    LAPAROSCOPIC LYSIS OF ADHESIONS AND ABDOMINAL WALL MASS REMOVAL performed by Merline Sachs, MD at [address]      multiple revisions, most recent        CURRENT MEDICATIONS  (may include discharge medications prescribed in the ED)  Current Outpatient Rx   Medication Sig Dispense Refill    ondansetron (ZOFRAN ODT) 4 MG disintegrating tablet Take 1 tablet by mouth every 12 hours as needed for Nausea 12 tablet 1    lisdexamfetamine (VYVANSE) 50 MG capsule Take 1 capsule by mouth every morning for 7 days. 7 capsule 0    oxyCODONE (ROXICODONE) 5 MG immediate release tablet Take 5 mg by mouth every 6 hours as needed for Pain.      hydrOXYzine HCl (ATARAX) 25 MG tablet Take 25 mg by mouth 3 times daily as needed for Itching      albuterol sulfate HFA (VENTOLIN HFA) 108 (90 Base) MCG/ACT inhaler Inhale 2 puffs into the lungs 4 times daily as needed for Wheezing 54 g 1    gabapentin (NEURONTIN) 400 MG capsule Take 400 mg by mouth 3 times daily.          ALLERGIES    Allergies   Allergen Reactions    Bee Venom Shortness Of Breath and Swelling    Bentyl [Dicyclomine Hcl] Shortness Of Breath and Anxiety    Dicyclomine Anxiety and Shortness Of Breath    Ketorolac Tromethamine Hives and Itching     Injectable only  Tolerates PO NSAIDs fine    Levofloxacin Anxiety and Hives Maitake Anaphylaxis    Shiitake Mushroom Anaphylaxis     Can not eat mushrooms    Sulfa Antibiotics Shortness Of Breath    Vancomycin Anaphylaxis and Hives    Zosyn [Piperacillin Sod-Tazobactam So] Hives, Shortness Of Breath, Nausea Only and Dizziness or Vertigo    Adhesive Tape Dermatitis     Other reaction(s): Dermatitis    Oxycodone-Acetaminophen Nausea And Vomiting     Tolerates Norco/Vicodin ok  Patient can not tolerate Percocet well. Extreme vomiting      Sulfacetamide Hives    Acetaminophen Anxiety     Patient reports when taking acetaminophen (including Norco/Percocet) she gets severe anxiety when taking this medication.      Butalbital-Apap-Caff-Cod Anxiety    Ceftaroline Rash     Does tolerate cefepime and has had doses before, confirmed 10/9/2022 with pharmacy    Daptomycin Swelling and Rash    Fosfomycin Tromethamine Nausea And Vomiting    Haldol [Haloperidol] Anxiety    Ketamine Nausea And Vomiting and Anxiety    Methocarbamol Rash    Morphine Hives    Nitrofurantoin Nausea And Vomiting     \"projectile vomiting\"    Prochlorperazine Anxiety    Reglan [Metoclopramide] Anxiety    Silicone Hives, Swelling and Rash    Tazobactam Nausea And Vomiting and Anxiety       FAMILY OR SOCIAL HISTORY    Family History   Problem Relation Age of Onset    High Blood Pressure Mother     Diabetes Mother     High Cholesterol Mother     Depression Mother     Diabetes Maternal Grandmother     High Blood Pressure Maternal Grandmother     High Cholesterol Maternal Grandmother     Heart Disease Maternal Grandfather     High Blood Pressure Maternal Grandfather     Heart Disease Paternal Grandmother     Stroke Paternal Grandfather     Depression Sister     Cirrhosis Father     Rheum Arthritis Neg Hx     Osteoarthritis Neg Hx     Asthma Neg Hx     Breast Cancer Neg Hx     Cancer Neg Hx     Heart Failure Neg Hx     Hypertension Neg Hx     Migraines Neg Hx     Ovarian Cancer Neg Hx     Rashes/Skin Problems Neg Hx     Seizures Neg Hx Thyroid Disease Neg Hx      Social History     Socioeconomic History    Marital status: Single   Occupational History    Occupation: disability, SSI    Tobacco Use    Smoking status: Every Day     Packs/day: 0.50     Years: 18.00     Pack years: 9.00     Types: Cigarettes    Smokeless tobacco: Never   Vaping Use    Vaping Use: Never used   Substance and Sexual Activity    Alcohol use: No     Alcohol/week: 0.0 standard drinks    Drug use: Never    Sexual activity: Not Currently     Partners: Male       PHYSICAL EXAM    VITAL SIGNS: /79   Pulse 90   Temp 98 °F (36.7 °C) (Oral)   Resp 15   Ht 4' 11\" (1.499 m)   Wt 160 lb 7.9 oz (72.8 kg)   LMP 10/31/2016 (Exact Date)   SpO2 96%   BMI 32.42 kg/m²   Constitutional:  Well developed, well nourished, no acute distress  Eyes:  Pupils equally round and reactive to light, sclera nonicteric  HENT:  External ears normal, nose normal, oropharynx moist, tongue normal  NECK: Normal range of motion, no tenderness  Respiratory: Lungs clear to auscultation bilaterally, no respiratory distress, no wheezing   Cardiovascular:  regular rate, regular rhythm, no murmurs   GI:  Soft, nondistended, normal bowel sounds, nontender  Musculoskeletal:  no edema, no acute deformities   Integument:  Skin is warm and dry, no obvious rash   Neurologic: She is not lethargic, oriented to person, place, time, situation, no aphasia, no slurred speech, CN II-XII intact, normal finger to nose test bilaterally, 5/5 strength in all 4 extremities, sensation to light touch intact bilaterally, patellar reflexes 2+ and equal bilaterally  Vascular: Radial and DP pulses 2+ and equal bilaterally  Psych: Pleasant affect, no hallucinations      RADIOLOGY  No orders to display           ED COURSE & MEDICAL DECISION MAKING    Pertinent Labs & Imaging studies reviewed and interpreted. (See chart for details)  See chart for details of medications given during the ED stay.     Differential diagnosis: status epilepticus, breakthrough seizure, intracranial bleed, brain tumor, hypoglycemia, neck fracture or other orthopedic fractures, posterior shoulder dislocation, tongue laceration, other    Patient presents to the emergency department status post she reportedly had a witnessed seizure/seizure-like activity. The activity was witnessed by her daughter. Patient denies any injury. She denies neck or back pain and no tongue laceration or other injury/concerns. She is neurovascularly intact. I will obtain lactic acid, CBC, and CMP. Work-up reveals:  Normal lactic [email or web address]  Magnesium ZKF@5.9  Metabolic panel: Mild hyponatremia with 2.4, hyperglycemic at 108, no renal dysfunction, or elevation in LFTs  CBC: RBC slightly elevated at 11.4 and neutrophils absolute elevated 7.8 otherwise unremarkable  Patient's lactic acid is inconsistent with seizure/chronic tongue muscle contractions. Given that the patient had negative work-up and imaging yesterday did not feel that reimaging is warranted today. Given the work-up and history do not feel that additional testing or admission is warranted as the risk outweighs any potential benefit. However, patient was given strict return cautions. Patient is referred to the [facility] neurology for evaluation. Strict return precautions. Patient verbalized understanding scribbled plan for discharge and follow-up. The patient was instructed to follow up as an outpatient in 1-2 days. The patient was instructed to return to the ED immediately for any new or worsening symptoms. The patient verbalized understanding. FINAL IMPRESSION      ICD-10-CM    1. Seizure-like activity ([facility] [address]  R56.9     Witnessed by teenage daughter      2.  S/P  shunt  Z98.2     Imaging  negative 2 days ago            PLAN    Discharge with outpatient follow-up (see EMR)    (Please note that this note was completed with a voice recognition program.  Every attempt was made to edit the dictations, but inevitably there remain words that are mis-transcribed.)         Uriel Macdonald, APRN - CNP  10/29/22 0000

## 2022-10-27 NOTE — H&P
Hospital Medicine History & Physical      PCP: Juan Bella APRN - CNP    Date of Admission: 10/27/2022    Date of Service: Pt seen/examined on 10/27/22 and Admitted to Inpatient with expected LOS greater than two midnights due to medical therapy. Chief Complaint:  Seizure like activity    History Of Present Illness:      Ankur Hunter is a 38 y.o. female with past medical history as below, including DMII, hydrocephalus with  shunt, PCOS, who presents with seizure like activity that started Monday. Patient was recently seen at [facility] for similar complaint. She says these episodes started with no provoking factor, no recent fever or infection. She describes shaking episodes. No tongue biting. She thinks she did have an episode of urinary incontinence. She reports feeling sore all over. Per report family witnessed the event, reported that patient had eyes closed, no post-ictal confusion. Past Medical History:          Diagnosis Date    ADHD (attention deficit hyperactivity disorder) 1988    Depression with anxiety     Diabetes mellitus ([facility]     pre-diabetes    Difficult intubation     airway swelled up    Encounter for imaging to screen for metal prior to MRI 06/01/2021    MRI Conditional Medtronic Non-Programmable shunt model#[id number] implanted 10/30/2020 at [facility]. Normal Mode. 1.5T or 3.0T. ESBL (extended spectrum beta-lactamase) producing bacteria infection 11/06/2019    urine    Functional ovarian cysts 2008    rt ovary cyst x 2 yrs.     Headache(784.0)     migraines    History of blood transfusion     at birth    History of kidney stones     History of PCOS     had hysterectomy in 2016    Hydrocephalus [facility])     Hyperlipidemia     Irritable bowel syndrome 2004    had colonoscopy about 6 yrs ago    Meningitis 07/2475    Neutrophilic leukocytosis     Nicotine dependence     PONV (postoperative nausea and vomiting)     very nauseated and sometimes wakes up with a Migraine--happened once after brain surgery    Primary osteoarthritis of left knee 2016    S/P cone biopsy of cervix 2004    Scoliosis 1990.s    Seizures ([address]     twice--last one in 2022     (ventriculoperitoneal) shunt status     hydrocephalus f/w Neurosurgeon at [facility]    Wears glasses     reading       Past Surgical History:          Procedure Laterality Date    ABDOMEN SURGERY N/A 2021    REMOVAL OF ABDOMINAL WALL MASS performed by Derian Saldaña MD at [facility]      appendicitis     SECTION  2005    placenta previa    CHOLECYSTECTOMY      COLONOSCOPY  2004    COLPOSCOPY      CSF SHUNT      replaced at age 17    CYSTOSCOPY      stone removal    HEMORRHOID SURGERY      HERNIA REPAIR Bilateral     inguinal    HERNIA REPAIR      hiatal hernia    HYSTERECTOMY, TOTAL ABDOMINAL (CERVIX REMOVED)  2016    TAHBSO, adhesions/PCOS    KNEE ARTHROSCOPY Left 2015    medial meniscectomy, chondroplasty, plica resection    LITHOTRIPSY Bilateral 12/10/2019    OTHER SURGICAL HISTORY  10/19/2016    op lap    VENTRAL HERNIA REPAIR N/A 2022    LAPAROSCOPIC LYSIS OF ADHESIONS AND ABDOMINAL WALL MASS REMOVAL performed by Derian Saldaña MD at [place] [address]      multiple revisions, most recent        Medications Prior to Admission:      Prior to Admission medications    Medication Sig Start Date End Date Taking? Authorizing Provider   ondansetron (ZOFRAN ODT) 4 MG disintegrating tablet Take 1 tablet by mouth every 12 hours as needed for Nausea 10/22/22   Letty Mancilla PA-C   lisdexamfetamine (VYVANSE) 50 MG capsule Take 1 capsule by mouth every morning for 7 days. 10/18/22 10/25/22  FREDRICK Coronado - CNP   oxyCODONE (ROXICODONE) 5 MG immediate release tablet Take 5 mg by mouth every 6 hours as needed for Pain.     Historical Provider, MD   hydrOXYzine HCl (ATARAX) 25 MG tablet Take 25 mg by mouth 3 times daily as needed for Itching    Historical Provider, MD   albuterol sulfate HFA (VENTOLIN HFA) 108 (90 Base) MCG/ACT inhaler Inhale 2 puffs into the lungs 4 times daily as needed for Wheezing  Patient not taking: Reported on 10/27/2022 9/23/22   Ba Rausch, APRN - CNP   gabapentin (NEURONTIN) 400 MG capsule Take 400 mg by mouth 3 times daily. Historical Provider, MD       Allergies:  Bee venom, Bentyl [dicyclomine hcl], Dicyclomine, Ketorolac tromethamine, Levofloxacin, Maitake, Shiitake mushroom, Sulfa antibiotics, Vancomycin, Zosyn [piperacillin sod-tazobactam so], Adhesive tape, Oxycodone-acetaminophen, Sulfacetamide, Acetaminophen, Butalbital-apap-caff-cod, Ceftaroline, Daptomycin, Fosfomycin tromethamine, Haldol [haloperidol], Ketamine, Methocarbamol, Morphine, Nitrofurantoin, Prochlorperazine, Reglan [metoclopramide], Silicone, and Tazobactam    Social History:      The patient currently lives in private residence     TOBACCO:   reports that she has been smoking cigarettes. She has a 9.00 pack-year smoking history. She has never used smokeless tobacco.  ETOH:   reports no history of alcohol use.       Family History:      Reviewed in detail and positive as follows:        Problem Relation Age of Onset    High Blood Pressure Mother     Diabetes Mother     High Cholesterol Mother     Depression Mother     Diabetes Maternal Grandmother     High Blood Pressure Maternal Grandmother     High Cholesterol Maternal Grandmother     Heart Disease Maternal Grandfather     High Blood Pressure Maternal Grandfather     Heart Disease Paternal Grandmother     Stroke Paternal Grandfather     Depression Sister     Cirrhosis Father     Rheum Arthritis Neg Hx     Osteoarthritis Neg Hx     Asthma Neg Hx     Breast Cancer Neg Hx     Cancer Neg Hx     Heart Failure Neg Hx     Hypertension Neg Hx     Migraines Neg Hx     Ovarian Cancer Neg Hx     Rashes/Skin Problems Neg Hx     Seizures Neg Hx     Thyroid Disease Neg Hx        REVIEW OF SYSTEMS:   Pertinent positives as noted in the HPI. All other systems reviewed and negative. PHYSICAL EXAM:    /83   Pulse 88   Temp 99 °F (37.2 °C) (Oral)   Resp 26   Ht 4' 11\" (1.499 m)   Wt 153 lb (69.4 kg)   LMP 10/31/2016 (Exact Date)   SpO2 100%   BMI 30.90 kg/m²     General appearance: No apparent distress, appears stated age and cooperative. HEENT: Normal cephalic, atraumatic without obvious deformity. Pupils equal, round, and reactive to light. Extra ocular muscles intact. Conjunctivae/corneas clear. Neck: Supple, with full range of motion. No jugular venous distention. Trachea midline. Respiratory:  Normal respiratory effort. Clear to auscultation, bilaterally without Rales/Wheezes/Rhonchi. Cardiovascular: Regular rate and rhythm with normal S1/S2 without murmurs, rubs or gallops. Abdomen: Soft, non-tender, non-distended with normal bowel sounds. Musculoskelatal: No clubbing, cyanosis or edema bilaterally. Full range of motion without deformity. Skin: Skin color, texture, turgor normal.  No rashes or lesions. Neurologic:  Neurovascularly intact without any focal sensory/motor deficits. Cranial nerves: II-XII intact, grossly non-focal.  Psychiatric: Alert and oriented, thought content appropriate, normal insight    Labs:     Recent Labs     10/25/22  0224 10/27/22  0228 10/27/22  1557   WBC 11.2* 11.4* 8.3   HGB 14.4 14.6 14.1   HCT 41.9 42.9 43.0    289 304     Recent Labs     10/25/22  0224 10/27/22  0228 10/27/22  1557    140 140   K 3.6 3.4* 3.6   CL 99 105 106   CO2 21 21 23   BUN 17 9 10   CREATININE 0.7 0.6 0.7   CALCIUM 10.2 9.4 9.3     Recent Labs     10/25/22  0224 10/27/22  0228   AST 12* 17   ALT 15 19   BILIDIR <0.2  --    BILITOT 0.4 0.5   ALKPHOS 124 113     No results for input(s): INR in the last 72 hours.   Recent Labs     10/25/22  0224   CKTOTAL 64   TROPONINI <0.01       Urinalysis:      Lab Results   Component Value Date/Time    NITRU Negative 10/27/2022 03:57 PM    WBCUA 8 09/21/2022 10:38 AM    BACTERIA 4+ 09/21/2022 10:38 AM    RBCUA 0-2 09/21/2022 10:38 AM    BLOODU Negative 10/27/2022 03:57 PM    SPECGRAV 1.020 10/27/2022 03:57 PM    GLUCOSEU Negative 10/27/2022 03:57 PM    GLUCOSEU NEGATIVE 10/08/2011 10:10 PM       Studies:  MRI BRAIN W WO CONTRAST    (Results Pending)       ASSESSMENT:    Active Hospital Problems    Diagnosis Date Noted    Seizure-like activity ([facility] [address] [R56.9] 10/27/2022     Priority: Medium       PLAN:    Seizure like activity  Neurology consulted  Seizure precautions  CK  Monitor electrolytes  UA negative  MRI brain w wo contrast  EEG    Anxiety  Patient reports her regular physician planning to start xanax  Will give diazepam for MRI    Chronic pain  Continue home neurontin and oxycodone    DVT Prophylaxis: Lovenox  Diet: No diet orders on file  Code Status: Prior    PT/OT Eval Status: pending    Dispo - Inpatient      Carla Dyer DO     Thank you FREDRICK Duran CNP for the opportunity to be involved in this patient's care. If you have any questions or concerns please feel free to contact me at [facility].

## 2022-10-27 NOTE — ED NOTES
Unable to draw labs after multiple attempts from 2 Rns and ED tech. IV access established but unable to get blood return, lab notified about coming to draw labs. DUANE Almeida notified.       Dionisio Lizama, SHANA  10/27/22 5112

## 2022-10-27 NOTE — ED NOTES
Pt ambulates to restroom with steady gait, updated on plan of care     Arie Stovall RN  10/27/22 3927

## 2022-10-27 NOTE — ED TRIAGE NOTES
Patient reports multiple seizures over that last 3 days. Patient states that she had 3 yesterday that lasted 10 minutes each.   +nausea

## 2022-10-27 NOTE — ED NOTES
D/C: Order noted for d/c. Pt confirmed d/c paperwork have correct name. Discharge and education instructions reviewed with patient. Teach-back successful. Pt verbalized understanding and signed d/c papers. Pt denied questions at this time. No acute distress noted. Patient instructed to follow-up as noted - return to emergency department if symptoms worsen. Patient verbalized understanding. Discharged per EDMD with discharge instructions. Pt discharged to [address] [place] [address]. Patient stable upon departure. Thanked patient for choosing [address] [place] [address] for care.           John Fulton RN  10/27/22 2849

## 2022-10-27 NOTE — CONSULTS
Neurology / Beauty Justin Note    Ike Chen MD is requesting this consult. Reason for Consult: seizures  Admission Chief Complaint: seizures    History of Present Illness     Ankur Hunter is a 38 y.o. y/o female with PMH significant for DM, ESBL, migraines, PCOS, hydrocephalus w/  shunt (placed in 1982), HLD, and meningitis. Per my interview with the patient she started having episodes where her \"head would feel fuzzy and her vision would blur before she would lose consiousness. \"  Patient does remember waking up. Patient states these episodes started happening on Monday. Patient currently lives with her cousin and cousin's . Per her cousin's , he witnessed 1 episode lasting ~3min during which she would hyperventilate with her eyes closed and not respond to him. 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 Per chart review patient seen at [facility] ED on 10/25 for a similar complaint. Head CT at [facility] showed L  shunt coursing into the body of L later ventricle with the tip at the midline near the 3rd ventricle and slit-like ventricles unchanged from prior exams. Shunt series XR shows high L  shunt coursing to the midline brain, a radiolucent connector in the scalp, and the catheter running down her scalp, neck, chest, and into the abdomen with no evidence of fragmentation or kinking. Patient has undergone multiple revisions for this shunt. A routine EEG was done at [facility] in 2016 when the patient was admitted for RUE weakness. EEG appeared to be normal in the awake state. REVIEW OF SYSTEMS:   Constitutional- No weight loss. C/o fevers   Eyes- No diplopia. No photophobia. Ears/nose/throat- No dysphagia.  No Dysarthria   Cardiovascular- No palpitations. No chest pain   Respiratory- No dyspnea. No Cough   Gastrointestinal- No Abdominal pain. No Vomiting. Genitourinary- No incontinence. No urinary retention   Musculoskeletal- No myalgia. No arthralgia   Skin- No rash. No easy bruising. Psychiatric- No depression. No anxiety   Endocrine- No diabetes. No thyroid issues. Hematologic- No bleeding difficulty. No fatigue   Neurologic- No numbness/tingling. No focal deficit. C/o headache. Past Medical, Surgical, Family, and Social History   PAST MEDICAL HISTORY:  Past Medical History:   Diagnosis Date    ADHD (attention deficit hyperactivity disorder) 1988    Depression with anxiety     Diabetes mellitus ([address]     pre-diabetes    Difficult intubation     airway swelled up    Encounter for imaging to screen for metal prior to MRI 06/01/2021    MRI Conditional Medtronic Non-Programmable shunt model#[id number] implanted 10/30/2020 at [facility]. Normal Mode. 1.5T or 3.0T. ESBL (extended spectrum beta-lactamase) producing bacteria infection 11/06/2019    urine    Functional ovarian cysts 2008    rt ovary cyst x 2 yrs.     Headache(784.0)     migraines    History of blood transfusion     at birth    History of kidney stones     History of PCOS     had hysterectomy in 2016    Hydrocephalus [facility])     Hyperlipidemia     Irritable bowel syndrome 2004    had colonoscopy about 6 yrs ago    Meningitis 29/1778    Neutrophilic leukocytosis     Nicotine dependence     PONV (postoperative nausea and vomiting)     very nauseated and sometimes wakes up with a Migraine--happened once after brain surgery    Primary osteoarthritis of left knee 07/01/2016    S/P cone biopsy of cervix 2004    Scoliosis 1990.s    Seizures ([facility] [address]     twice--last one in June2022     (ventriculoperitoneal) shunt status 1982    hydrocephalus f/w Neurosurgeon at [facility]    Wears glasses     reading     SURGICAL HISTORY:  Past Surgical History:   Procedure Laterality Date    ABDOMEN SURGERY N/A 2021    REMOVAL OF ABDOMINAL WALL MASS performed by Bri Singleton MD at [facility]  2003    appendicitis     SECTION  2005    placenta previa    CHOLECYSTECTOMY      COLONOSCOPY  2004    COLPOSCOPY      CSF SHUNT      replaced at age 17    CYSTOSCOPY      stone removal    HEMORRHOID SURGERY      HERNIA REPAIR Bilateral     inguinal    HERNIA REPAIR      hiatal hernia    HYSTERECTOMY, TOTAL ABDOMINAL (CERVIX REMOVED)  2016    TAHBSO, adhesions/PCOS    KNEE ARTHROSCOPY Left 2015    medial meniscectomy, chondroplasty, plica resection    LITHOTRIPSY Bilateral 12/10/2019    OTHER SURGICAL HISTORY  10/19/2016    op lap    VENTRAL HERNIA REPAIR N/A 2022    LAPAROSCOPIC LYSIS OF ADHESIONS AND ABDOMINAL WALL MASS REMOVAL performed by Bri Singleton MD at [place] [address]      multiple revisions, most recent      FAMILY HISTORY & SOCIAL HISTORY:  Family history non-contributory  Family History   Problem Relation Age of Onset    High Blood Pressure Mother     Diabetes Mother     High Cholesterol Mother     Depression Mother     Diabetes Maternal Grandmother     High Blood Pressure Maternal Grandmother     High Cholesterol Maternal Grandmother     Heart Disease Maternal Grandfather     High Blood Pressure Maternal Grandfather     Heart Disease Paternal Grandmother     Stroke Paternal Grandfather     Depression Sister     Cirrhosis Father     Rheum Arthritis Neg Hx     Osteoarthritis Neg Hx     Asthma Neg Hx     Breast Cancer Neg Hx     Cancer Neg Hx     Heart Failure Neg Hx     Hypertension Neg Hx     Migraines Neg Hx     Ovarian Cancer Neg Hx     Rashes/Skin Problems Neg Hx     Seizures Neg Hx     Thyroid Disease Neg Hx      Social History     Tobacco Use    Smoking status: Every Day     Packs/day: 0.50     Years: 18.00     Pack years: 9.00     Types: Cigarettes    Smokeless tobacco: Never   Vaping Use    Vaping Use: Never used Substance Use Topics    Alcohol use: No     Alcohol/week: 0.0 standard drinks    Drug use: Never         Allergies & Outpatient Medications   ALLERGIES:  Allergies   Allergen Reactions    Bee Venom Shortness Of Breath and Swelling    Bentyl [Dicyclomine Hcl] Shortness Of Breath and Anxiety    Dicyclomine Anxiety and Shortness Of Breath    Ketorolac Tromethamine Hives and Itching     Injectable only  Tolerates PO NSAIDs fine    Levofloxacin Anxiety and Hives    Maitake Anaphylaxis    Shiitake Mushroom Anaphylaxis     Can not eat mushrooms    Sulfa Antibiotics Shortness Of Breath    Vancomycin Anaphylaxis and Hives    Zosyn [Piperacillin Sod-Tazobactam So] Hives, Shortness Of Breath, Nausea Only and Dizziness or Vertigo    Adhesive Tape Dermatitis     Other reaction(s): Dermatitis    Oxycodone-Acetaminophen Nausea And Vomiting     Tolerates Norco/Vicodin ok  Patient can not tolerate Percocet well. Extreme vomiting      Sulfacetamide Hives    Acetaminophen Anxiety     Patient reports when taking acetaminophen (including Norco/Percocet) she gets severe anxiety when taking this medication.      Butalbital-Apap-Caff-Cod Anxiety    Ceftaroline Rash     Does tolerate cefepime and has had doses before, confirmed 10/9/2022 with pharmacy    Daptomycin Swelling and Rash    Fosfomycin Tromethamine Nausea And Vomiting    Haldol [Haloperidol] Anxiety    Ketamine Nausea And Vomiting and Anxiety    Methocarbamol Rash    Morphine Hives    Nitrofurantoin Nausea And Vomiting     \"projectile vomiting\"    Prochlorperazine Anxiety    Reglan [Metoclopramide] Anxiety    Silicone Hives, Swelling and Rash    Tazobactam Nausea And Vomiting and Anxiety     HOME MEDICATIONS:  Patient's Medications   New Prescriptions    No medications on file   Previous Medications    ALBUTEROL SULFATE HFA (VENTOLIN HFA) 108 (90 BASE) MCG/ACT INHALER    Inhale 2 puffs into the lungs 4 times daily as needed for Wheezing    GABAPENTIN (NEURONTIN) 400 MG CAPSULE Take 400 mg by mouth 3 times daily. HYDROXYZINE HCL (ATARAX) 25 MG TABLET    Take 25 mg by mouth 3 times daily as needed for Itching    LISDEXAMFETAMINE (VYVANSE) 50 MG CAPSULE    Take 1 capsule by mouth every morning for 7 days. ONDANSETRON (ZOFRAN ODT) 4 MG DISINTEGRATING TABLET    Take 1 tablet by mouth every 12 hours as needed for Nausea    OXYCODONE (ROXICODONE) 5 MG IMMEDIATE RELEASE TABLET    Take 5 mg by mouth every 6 hours as needed for Pain. Modified Medications    No medications on file   Discontinued Medications    No medications on file         Physical Exam   PHYSICAL EXAM:  Vitals:    10/27/22 1242   BP: 123/89   Pulse: (!) 102   Resp: 17   Temp: 99 °F (37.2 °C)   TempSrc: Oral   SpO2: 99%   Weight: 153 lb (69.4 kg)   Height: 4' 11\" (1.499 m)         General: Alert, no distress, well-nourished  Neurologic  Mental status:   orientation to person, place, time, situation   Attention intact as able to attend well to the exam     Language fluent in conversation   Comprehension intact; follows simple commands    Cranial nerves:   CN2: Visual fields full w/o extinction on confrontational testing   CN 3,4,6: Pupils equal and reactive to light, extraocular muscles intact  CN5: Facial sensation symmetric   CN7: Face symmetric  CN8: Hearing symmetric to spoken voice  CN9: Palate elevated symmetrically  CN11: Traps full strength on shoulder shrug  CN12: Tongue midline with protrusion    Motor Exam:   R  L    Deltoid 5  5   Biceps 5 5   Triceps 5 5   Wrist extension  5 5   Interossei 5 5      R  L    Hip flexion  5  5   Hip extension  5 5   Knee flexion  5 5   Knee extension  5 5   Ankle dorsiflexion  5 5   Ankle plantar flexion  5 5     Intermittent tremors to BUE, patient had no LOC, or changes in exam when these tremors occurred. Deep tendon reflexes:    R  L    Biceps  2  2   Brachioradialis  2 2   Patellar  2 2     Sensory: light touch intact and symmetric in all 4 extremities.   No sensory extinction on bilateral simultaneous stimulation  Cerebellar/coordination: finger nose finger normal without ataxia  Tone: normal in all 4 extremities  Gait: held 2/2 patient safety      OTHER SYSTEMS:  Cardiovascular: Warm, appears well perfused   Respiratory: Easy, non-labored respiratory pattern   Abdominal: Abdomen is without distention   Extremities: Upper and lower extremities are atraumatic in appearance without deformity. No swelling or erythema. Diagnostic Testing Results   IMAGES:  Images personally reviewed and agree w/ radiology interpretation. Previous Imaging:   Head CT w/o Contrast: 10/25/22  Impression   1. Left ventriculoperitoneal shunt coursing into the body left lateral   ventricle with the tip at the midline near the 3rd ventricle. Slit-like   ventricles but are unchanged from the prior exam.  No fragmentation of the   catheter or hematoma within the scalp. 2.  Well maintained brain volume and the overall appearance is stable. XR Shunt Series: 10/25/22  Impression   High left ventriculoperitoneal shunt coursing to the midline brain. A   radiolucent connector in the scalp. Catheter down the scalp, neck, chest,   and into the abdomen without evidence of fragmentation or kinking. Hazy density in the infrahilar lungs could be infectious or inflammatory. LABS:  All results below personally reviewed. Pertinent positives & negatives are addressed in Impression & Recommendations below.      LABS   Metabolic Panel Recent Labs     10/25/22  0224 10/27/22  0228    140   K 3.6 3.4*   CL 99 105   CO2 21 21   BUN 17 9   CREATININE 0.7 0.6   GLUCOSE 137* 108*   CALCIUM 10.2 9.4   LABALBU 4.7 4.4   MG  --  2.40   ALKPHOS 124 113   ALT 15 19   AST 12* 17      CBC / Coags Recent Labs     10/25/22  0224 10/27/22  0228   WBC 11.2* 11.4*   RBC 5.13 5.17   HGB 14.4 14.6   HCT 41.9 42.9    289      Other No results for input(s): LABA1C, LDLCALC, TRIG, TSH, [id number], FOLATE, LABSALI, COVID19 in the last 72 hours. Recent Labs     10/27/22  0241   LACTA 0.9          CURRENT SCHEDULED MEDICATIONS   Inpatient Medications      Infusions      Antibiotics   Recent Abx Admin        No antibiotic orders with administrations found. IMPRESSION & RECOMMENDATIONS     IMPRESSION:  Ana Barnes is a 37 yo female who presents for multiple episodes of \"feeling fuzzy, having blurred vision, and sometimes losing consciousness\". Patient states she can sometimes remain consciousness when she has this sensation by lying down. Patient had 1 witnessed episode by family. Family member states the patient began hyperventilating with her eyes closed, and the episode lasted ~3min. RECOMMENDATIONS:  - routine EEG  - MRI brain w and wo      FREDRICK Bowman - CNP   Neurology & Neurocritical Care   10/27/2022 2:09 PM      ICU Patients:   Neurocritical Care Line: [phone]  [facility]: [facility] Neurocritical Care    Floor / PCU Patients:  Neurology Line: [phone]  [facility]: [facility] Neurology    I spent 60 minutes in the care of this patient. Over 50% of that time was in face-to-face counseling regarding disease process, diagnostic testing, preventative measures, and answering patient and family questions.

## 2022-10-27 NOTE — ED PROVIDER NOTES
Date of evaluation: 10/27/2022    Chief Complaint   Seizures      Nursing Notes, Past Medical Hx, Past Surgical Hx, Social Hx, Allergies, and Family Hx were reviewed. History of Present Illness     Farrukh Whitaker is a 38 y.o. female who presents seizure and headache. Apparently she says she has had 5 seizures in the last day. She was recently at [facility] approximately 12 hours ago had work-up which was negative. She had a shunt series done a day prior to that which did not show any acute abnormality. She states she was told they cannot do anything for her at [facility] they did not have neurology or EEG. She states she had another 1 today. She describes these as flushing and then passes out. She denies any chest pain or shortness of breath. She still has abdominal pain which she has had for some time. Denies any cough congestion. The shunt was performed in [place] due to congenital hydrocephalus    Review of Systems     Review of Systems   Constitutional:  Positive for appetite change and fatigue. Respiratory:  Negative for shortness of breath. Endocrine: Negative. Genitourinary: Negative. Neurological:  Positive for seizures. All other systems reviewed and are negative.     Past Medical, Surgical, Family, and Social History     She has a past medical history of ADHD (attention deficit hyperactivity disorder), Depression with anxiety, Diabetes mellitus ([address] Difficult intubation, Encounter for imaging to screen for metal prior to MRI, ESBL (extended spectrum beta-lactamase) producing bacteria infection, Functional ovarian cysts, Headache(784.0), History of blood transfusion, History of kidney stones, History of PCOS, Hydrocephalus ([address] Hyperlipidemia, Irritable bowel syndrome, Meningitis, Neutrophilic leukocytosis, Nicotine dependence, PONV (postoperative nausea and vomiting), Primary osteoarthritis of left knee, S/P cone biopsy of cervix, Scoliosis, Seizures ([address]  (ventriculoperitoneal) shunt status, and Wears glasses. She has a past surgical history that includes Appendectomy (); Colonoscopy (); Colposcopy ();  section (); csf shunt; Cystoscopy; Knee arthroscopy (Left, 2015); Cholecystectomy; other surgical history (10/19/2016); Ventriculoperitoneal shunt; Hysterectomy, total abdominal (2016); Lithotripsy (Bilateral, 12/10/2019); hernia repair (Bilateral, ); hernia repair (); Hemorrhoid surgery; Abdomen surgery (N/A, 2021); and ventral hernia repair (N/A, 2022). Her family history includes Cirrhosis in her father; Depression in her mother and sister; Diabetes in her maternal grandmother and mother; Heart Disease in her maternal grandfather and paternal grandmother; High Blood Pressure in her maternal grandfather, maternal grandmother, and mother; High Cholesterol in her maternal grandmother and mother; Stroke in her paternal grandfather. She reports that she has been smoking cigarettes. She has a 9.00 pack-year smoking history. She has never used smokeless tobacco. She reports that she does not drink alcohol and does not use drugs. Medications     Previous Medications    ALBUTEROL SULFATE HFA (VENTOLIN HFA) 108 (90 BASE) MCG/ACT INHALER    Inhale 2 puffs into the lungs 4 times daily as needed for Wheezing    GABAPENTIN (NEURONTIN) 400 MG CAPSULE    Take 400 mg by mouth 3 times daily. HYDROXYZINE HCL (ATARAX) 25 MG TABLET    Take 25 mg by mouth 3 times daily as needed for Itching    LISDEXAMFETAMINE (VYVANSE) 50 MG CAPSULE    Take 1 capsule by mouth every morning for 7 days. ONDANSETRON (ZOFRAN ODT) 4 MG DISINTEGRATING TABLET    Take 1 tablet by mouth every 12 hours as needed for Nausea    OXYCODONE (ROXICODONE) 5 MG IMMEDIATE RELEASE TABLET    Take 5 mg by mouth every 6 hours as needed for Pain.        Allergies     She is allergic to bee venom, bentyl [dicyclomine hcl], dicyclomine, ketorolac tromethamine, levofloxacin, maitake, shiitake mushroom, sulfa antibiotics, vancomycin, zosyn [piperacillin sod-tazobactam so], adhesive tape, oxycodone-acetaminophen, sulfacetamide, acetaminophen, butalbital-apap-caff-cod, ceftaroline, daptomycin, fosfomycin tromethamine, haldol [haloperidol], ketamine, methocarbamol, morphine, nitrofurantoin, prochlorperazine, reglan [metoclopramide], silicone, and tazobactam.    Physical Exam     INITIAL VITALS: /89   Pulse (!) 102   Temp 99 °F (37.2 °C) (Oral)   Resp 17   Ht 4' 11\" (1.499 m)   Wt 153 lb (69.4 kg)   LMP 10/31/2016 (Exact Date)   SpO2 99%   BMI 30.90 kg/m²    Physical Exam  Constitutional:       Appearance: Normal appearance. She is normal weight. HENT:      Head: Normocephalic. Right Ear: Ear canal normal.      Left Ear: Ear canal normal.      Mouth/Throat:      Mouth: Mucous membranes are moist.   Cardiovascular:      Rate and Rhythm: Normal rate and regular rhythm. Heart sounds: No murmur heard. No friction rub. No gallop. Pulmonary:      Effort: Pulmonary effort is normal.   Abdominal:      General: Abdomen is flat. Comments: Left upper quadrant tenderness which she states her  shunt is which is always been this tender. No overlying erythema or skin changes   Musculoskeletal:         General: Normal range of motion. Cervical back: Normal range of motion. No rigidity. Skin:     General: Skin is warm. Capillary Refill: Capillary refill takes less than 2 seconds. Neurological:      Mental Status: She is alert.       Comments: Is awake alert actually ambulated back to the bed she at times has shaking in her left hand which is volitional she has no pronator drift strength is intact in upper and lower extremities no nystagmus   Psychiatric:         Mood and Affect: Mood normal.       Diagnostic Results     EKG   Normal sinus rhythm QTc of 471 nonspecific ST-T wave abnormalities no acute ischemia    RADIOLOGY:  No orders to display         LABS:   Labs Reviewed   CBC WITH AUTO DIFFERENTIAL   BASIC METABOLIC PANEL   HCG, QUANTITATIVE, PREGNANCY   LACTIC ACID   URINALYSIS WITH REFLEX TO CULTURE       BEDSIDE ULTRASOUND:      VITALS:  BP: 123/89, Temp: 99 °F (37.2 °C), Heart Rate: (!) 102, Resp: 17     Procedures         ED Course     The patient was given the followingmedications:  No orders of the defined types were placed in this encounter. CONSULTS:  Farhat Malik is a 38 y.o. female presents with reported seizures over the last couple days. She does have a  shunt. This was just imaged 2 days ago with no significant pathology. She has no fevers no meningismus. I did not feel the need to reimage since she had that this done 48 hours ago. She states she is here since they told her that we have neurology out here to evaluate her for seizures. She is awake alert baseline neurologic state at this point. I did consult neurology and will here to see the patient. They will staffed the patient with the attending neurology to decide if they want to do a spot EEG. .        Clinical Impression     No diagnosis found.    Kike Bellamy     PATIENT REFERRED TO:  No follow-up provider specified.     DISCHARGE MEDICATIONS:  New Prescriptions    No medications on file       DISPOSITION         Cj Self MD  10/27/22 5027

## 2022-10-27 NOTE — ED NOTES
Pt up in room, gait steady, no vomiting or seizure activity noted     Renay Peterson RN  10/27/22 1276

## 2022-10-28 ENCOUNTER — APPOINTMENT (OUTPATIENT)
Dept: MRI IMAGING | Age: 42
DRG: 053 | End: 2022-10-28
Payer: COMMERCIAL

## 2022-10-28 LAB
ANION GAP SERPL CALCULATED.3IONS-SCNC: 11 MMOL/L (ref 3–16)
BASOPHILS ABSOLUTE: 0.1 K/UL (ref 0–0.2)
BASOPHILS RELATIVE PERCENT: 0.8 %
BUN BLDV-MCNC: 13 MG/DL (ref 7–20)
CALCIUM SERPL-MCNC: 9 MG/DL (ref 8.3–10.6)
CHLORIDE BLD-SCNC: 105 MMOL/L (ref 99–110)
CO2: 25 MMOL/L (ref 21–32)
CREAT SERPL-MCNC: 0.8 MG/DL (ref 0.6–1.1)
EOSINOPHILS ABSOLUTE: 0.5 K/UL (ref 0–0.6)
EOSINOPHILS RELATIVE PERCENT: 6.3 %
GFR SERPL CREATININE-BSD FRML MDRD: >60 ML/MIN/{1.73_M2}
GLUCOSE BLD-MCNC: 105 MG/DL (ref 70–99)
GLUCOSE BLD-MCNC: 110 MG/DL (ref 70–99)
GLUCOSE BLD-MCNC: 127 MG/DL (ref 70–99)
HCT VFR BLD CALC: 38.9 % (ref 36–48)
HEMOGLOBIN: 13.1 G/DL (ref 12–16)
LYMPHOCYTES ABSOLUTE: 3.3 K/UL (ref 1–5.1)
LYMPHOCYTES RELATIVE PERCENT: 46 %
MAGNESIUM: 2.2 MG/DL (ref 1.8–2.4)
MCH RBC QN AUTO: 28.5 PG (ref 26–34)
MCHC RBC AUTO-ENTMCNC: 33.6 G/DL (ref 31–36)
MCV RBC AUTO: 84.9 FL (ref 80–100)
MONOCYTES ABSOLUTE: 0.4 K/UL (ref 0–1.3)
MONOCYTES RELATIVE PERCENT: 5.8 %
NEUTROPHILS ABSOLUTE: 2.9 K/UL (ref 1.7–7.7)
NEUTROPHILS RELATIVE PERCENT: 41.1 %
PDW BLD-RTO: 15.1 % (ref 12.4–15.4)
PERFORMED ON: ABNORMAL
PERFORMED ON: ABNORMAL
PLATELET # BLD: 267 K/UL (ref 135–450)
PMV BLD AUTO: 7.8 FL (ref 5–10.5)
POTASSIUM REFLEX MAGNESIUM: 3 MMOL/L (ref 3.5–5.1)
RBC # BLD: 4.58 M/UL (ref 4–5.2)
SODIUM BLD-SCNC: 141 MMOL/L (ref 136–145)
WBC # BLD: 7.1 K/UL (ref 4–11)

## 2022-10-28 PROCEDURE — 6360000002 HC RX W HCPCS: Performed by: INTERNAL MEDICINE

## 2022-10-28 PROCEDURE — 2060000000 HC ICU INTERMEDIATE R&B

## 2022-10-28 PROCEDURE — 6370000000 HC RX 637 (ALT 250 FOR IP): Performed by: INTERNAL MEDICINE

## 2022-10-28 PROCEDURE — 85025 COMPLETE CBC W/AUTO DIFF WBC: CPT

## 2022-10-28 PROCEDURE — 70553 MRI BRAIN STEM W/O & W/DYE: CPT

## 2022-10-28 PROCEDURE — 99232 SBSQ HOSP IP/OBS MODERATE 35: CPT

## 2022-10-28 PROCEDURE — A9576 INJ PROHANCE MULTIPACK: HCPCS

## 2022-10-28 PROCEDURE — 80048 BASIC METABOLIC PNL TOTAL CA: CPT

## 2022-10-28 PROCEDURE — 6360000004 HC RX CONTRAST MEDICATION

## 2022-10-28 PROCEDURE — 36415 COLL VENOUS BLD VENIPUNCTURE: CPT

## 2022-10-28 PROCEDURE — 2580000003 HC RX 258: Performed by: INTERNAL MEDICINE

## 2022-10-28 PROCEDURE — 83735 ASSAY OF MAGNESIUM: CPT

## 2022-10-28 PROCEDURE — 95819 EEG AWAKE AND ASLEEP: CPT

## 2022-10-28 RX ORDER — INSULIN LISPRO 100 [IU]/ML
0-8 INJECTION, SOLUTION INTRAVENOUS; SUBCUTANEOUS
Status: DISCONTINUED | OUTPATIENT
Start: 2022-10-28 | End: 2022-10-29 | Stop reason: HOSPADM

## 2022-10-28 RX ORDER — INSULIN LISPRO 100 [IU]/ML
0-4 INJECTION, SOLUTION INTRAVENOUS; SUBCUTANEOUS NIGHTLY
Status: DISCONTINUED | OUTPATIENT
Start: 2022-10-28 | End: 2022-10-29 | Stop reason: HOSPADM

## 2022-10-28 RX ORDER — DEXTROSE MONOHYDRATE 100 MG/ML
INJECTION, SOLUTION INTRAVENOUS CONTINUOUS PRN
Status: DISCONTINUED | OUTPATIENT
Start: 2022-10-28 | End: 2022-10-29 | Stop reason: HOSPADM

## 2022-10-28 RX ORDER — DIAZEPAM 5 MG/1
5 TABLET ORAL
Status: COMPLETED | OUTPATIENT
Start: 2022-10-28 | End: 2022-10-28

## 2022-10-28 RX ORDER — DIAZEPAM 5 MG/ML
5 INJECTION, SOLUTION INTRAMUSCULAR; INTRAVENOUS
Status: COMPLETED | OUTPATIENT
Start: 2022-10-28 | End: 2022-10-28

## 2022-10-28 RX ORDER — OXYCODONE HYDROCHLORIDE 5 MG/1
5 TABLET ORAL ONCE
Status: COMPLETED | OUTPATIENT
Start: 2022-10-28 | End: 2022-10-28

## 2022-10-28 RX ADMIN — DIAZEPAM 5 MG: 5 INJECTION, SOLUTION INTRAMUSCULAR; INTRAVENOUS at 02:29

## 2022-10-28 RX ADMIN — GADOTERIDOL 14 ML: 279.3 INJECTION, SOLUTION INTRAVENOUS at 08:49

## 2022-10-28 RX ADMIN — OXYCODONE 5 MG: 5 TABLET ORAL at 11:54

## 2022-10-28 RX ADMIN — SODIUM CHLORIDE, PRESERVATIVE FREE 10 ML: 5 INJECTION INTRAVENOUS at 09:57

## 2022-10-28 RX ADMIN — DIAZEPAM 5 MG: 5 INJECTION, SOLUTION INTRAMUSCULAR; INTRAVENOUS at 08:10

## 2022-10-28 RX ADMIN — GABAPENTIN 400 MG: 400 CAPSULE ORAL at 20:51

## 2022-10-28 RX ADMIN — ENOXAPARIN SODIUM 40 MG: 100 INJECTION SUBCUTANEOUS at 17:55

## 2022-10-28 RX ADMIN — GABAPENTIN 400 MG: 400 CAPSULE ORAL at 14:59

## 2022-10-28 RX ADMIN — GABAPENTIN 400 MG: 400 CAPSULE ORAL at 09:56

## 2022-10-28 RX ADMIN — PROMETHAZINE HYDROCHLORIDE 12.5 MG: 12.5 TABLET ORAL at 09:47

## 2022-10-28 RX ADMIN — ONDANSETRON 4 MG: 2 INJECTION INTRAMUSCULAR; INTRAVENOUS at 00:02

## 2022-10-28 RX ADMIN — PROMETHAZINE HYDROCHLORIDE 12.5 MG: 12.5 TABLET ORAL at 22:26

## 2022-10-28 RX ADMIN — DIAZEPAM 5 MG: 5 TABLET ORAL at 20:51

## 2022-10-28 RX ADMIN — OXYCODONE 5 MG: 5 TABLET ORAL at 19:40

## 2022-10-28 RX ADMIN — DIPHENHYDRAMINE HYDROCHLORIDE 25 MG: 25 TABLET ORAL at 19:40

## 2022-10-28 RX ADMIN — OXYCODONE HYDROCHLORIDE 5 MG: 5 TABLET ORAL at 15:00

## 2022-10-28 ASSESSMENT — PAIN DESCRIPTION - ORIENTATION
ORIENTATION: RIGHT;LEFT
ORIENTATION: RIGHT;LEFT
ORIENTATION: ANTERIOR
ORIENTATION: MID

## 2022-10-28 ASSESSMENT — PAIN - FUNCTIONAL ASSESSMENT: PAIN_FUNCTIONAL_ASSESSMENT: PREVENTS OR INTERFERES SOME ACTIVE ACTIVITIES AND ADLS

## 2022-10-28 ASSESSMENT — PAIN DESCRIPTION - PAIN TYPE: TYPE: ACUTE PAIN;CHRONIC PAIN

## 2022-10-28 ASSESSMENT — PAIN DESCRIPTION - LOCATION
LOCATION: HEAD

## 2022-10-28 ASSESSMENT — PAIN SCALES - GENERAL
PAINLEVEL_OUTOF10: 8
PAINLEVEL_OUTOF10: 4

## 2022-10-28 ASSESSMENT — PAIN DESCRIPTION - DESCRIPTORS
DESCRIPTORS: ACHING
DESCRIPTORS: ACHING;DISCOMFORT
DESCRIPTORS: ACHING
DESCRIPTORS: ACHING

## 2022-10-28 ASSESSMENT — PAIN DESCRIPTION - FREQUENCY: FREQUENCY: INTERMITTENT

## 2022-10-28 ASSESSMENT — PAIN DESCRIPTION - ONSET: ONSET: ON-GOING

## 2022-10-28 NOTE — PLAN OF CARE
Problem: Discharge Planning  Goal: Discharge to home or other facility with appropriate resources  Outcome: Progressing     Problem: Pain  Goal: Verbalizes/displays adequate comfort level or baseline comfort level  Outcome: Progressing     Problem: Chronic Conditions and Co-morbidities  Goal: Patient's chronic conditions and co-morbidity symptoms are monitored and maintained or improved  Outcome: Progressing  Flowsheets (Taken 10/28/2022 1630)  Care Plan - Patient's Chronic Conditions and Co-Morbidity Symptoms are Monitored and Maintained or Improved:   Monitor and assess patient's chronic conditions and comorbid symptoms for stability, deterioration, or improvement   Collaborate with multidisciplinary team to address chronic and comorbid conditions and prevent exacerbation or deterioration   Update acute care plan with appropriate goals if chronic or comorbid symptoms are exacerbated and prevent overall improvement and discharge

## 2022-10-28 NOTE — PROGRESS NOTES
Pt is A/O x4. VSS. Neuro checks remain unchanged, no seizure activity my shift. MRI and EEG completed. Pt c/o generalized pain. Managed with PRN PO meds per MAR. Up as SBA, tolerating well. Fall precautions in place. Will continue to monitor.

## 2022-10-28 NOTE — PLAN OF CARE
4 Eyes Skin Assessment     NAME:  Ash Giang  YOB: 1980  MEDICAL RECORD NUMBER:  [id number]    The patient is being assess for  Admission    I agree that 2 RN's have performed a thorough Head to Toe Skin Assessment on the patient. ALL assessment sites listed below have been assessed. Areas assessed by both nurses:    Head, Face, Ears, Shoulders, Back, Chest, Arms, Elbows, Hands, Sacrum. Buttock, Coccyx, Ischium, Legs. Feet and Heels, and Other ***        Does the Patient have a Wound?  No noted wound(s)       Noel Prevention initiated:  {YES/NO:19732}   Wound Care Orders initiated:  {YES/NO:19732}    Pressure Injury (Stage 3,4, Unstageable, DTI, NWPT, and Complex wounds) if present place referral/consult order under [de-identified] No    New and Established Ostomies if present place consult order under : No      Nurse 1 eSignature: Electronically signed by Tito Soliman RN on 10/27/22 at 10:22 PM EDT    **SHARE this note so that the co-signing nurse is able to place an eSignature**    Nurse 2 eSignature: {Esignature:[id number]}

## 2022-10-28 NOTE — PROGRESS NOTES
NEUROLOGY / NEUROCRITICAL CARE PROGRESS NOTE       Patient Name: Jayson Greco YOB: 1980   Sex: Female Age: 38 yrs     CC / Reason for Consult: seizure like activity    Interval Hx / Changes over last 24 hours:   No changes to exam overnight. No seizure like activity overnight. MRI brain w/ and w/o contrast shows no acute intracranial abnormality. ROS: Patient denies any complaints at this time. HISTORY   Admission HPI:   Jayson Greco is a 38 y.o. y/o female with PMH significant for DM, ESBL, migraines, PCOS, hydrocephalus w/  shunt (placed in 1982), HLD, and meningitis. Per my interview with the patient she started having episodes where her \"head would feel fuzzy and her vision would blur before she would lose consiousness. \"  Patient does remember waking up. Patient states these episodes started happening on Monday. Patient currently lives with her cousin and cousin's . Per her cousin's , he witnessed 1 episode lasting ~3min during which she would hyperventilate with her eyes closed and not respond to him. 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 Per chart review patient seen at [facility] ED on 10/25 for a similar complaint. Head CT at [facility] showed L  shunt coursing into the body of L later ventricle with the tip at the midline near the 3rd ventricle and slit-like ventricles unchanged from prior exams. Shunt series XR shows high L  shunt coursing to the midline brain, a radiolucent connector in the scalp, and the catheter running down her scalp, neck, chest, and into the abdomen with no evidence of fragmentation or kinking. Patient has undergone multiple revisions for this shunt.        A routine EEG was done at [facility] in 2016 when the patient was admitted for RUE weakness. EEG appeared to be normal in the awake state. PMH Past Medical History:   Diagnosis Date    ADHD (attention deficit hyperactivity disorder) 1988    Depression with anxiety     Diabetes mellitus ([address]     pre-diabetes    Difficult intubation     airway swelled up    Encounter for imaging to screen for metal prior to MRI 06/01/2021    MRI Conditional [employer]onic Non-Programmable shunt model#60031 implanted 10/30/2020 at [facility]. Normal Mode. 1.5T or 3.0T. ESBL (extended spectrum beta-lactamase) producing bacteria infection 11/06/2019    urine    Functional ovarian cysts 2008    rt ovary cyst x 2 yrs.     Headache(784.0)     migraines    History of blood transfusion     at birth    History of kidney stones     History of PCOS     had hysterectomy in 2016    Hydrocephalus [facility])     Hyperlipidemia     Irritable bowel syndrome 2004    had colonoscopy about 6 yrs ago    Meningitis 67/0878    Neutrophilic leukocytosis     Nicotine dependence     PONV (postoperative nausea and vomiting)     very nauseated and sometimes wakes up with a Migraine--happened once after brain surgery    Primary osteoarthritis of left knee 07/01/2016    S/P cone biopsy of cervix 2004    Scoliosis 1990.s    Seizures ([address]     twice--last one in June2022     (ventriculoperitoneal) shunt status 1982    hydrocephalus f/w Neurosurgeon at [facility]    Wears glasses     reading      Allergies Allergies   Allergen Reactions    Bee Venom Shortness Of Breath and Swelling    Bentyl [Dicyclomine Hcl] Shortness Of Breath and Anxiety    Dicyclomine Anxiety and Shortness Of Breath    Ketorolac Tromethamine Hives and Itching     Injectable only  Tolerates PO NSAIDs fine    Levofloxacin Anxiety and Hives    Maitake Anaphylaxis    Shiitake Mushroom Anaphylaxis     Can not eat mushrooms    Sulfa Antibiotics Shortness Of Breath    Vancomycin Anaphylaxis and Hives    Zosyn [Piperacillin Sod-Tazobactam So] Hives, Shortness Of Breath, Nausea Only and Dizziness or Vertigo    Adhesive Tape Dermatitis     Other reaction(s): Dermatitis    Oxycodone-Acetaminophen Nausea And Vomiting     Tolerates Norco/Vicodin ok  Patient can not tolerate Percocet well. Extreme vomiting      Sulfacetamide Hives    Acetaminophen Anxiety     Patient reports when taking acetaminophen (including Norco/Percocet) she gets severe anxiety when taking this medication. Butalbital-Apap-Caff-Cod Anxiety    Ceftaroline Rash     Does tolerate cefepime and has had doses before, confirmed 10/9/2022 with pharmacy    Daptomycin Swelling and Rash    Fosfomycin Tromethamine Nausea And Vomiting    Haldol [Haloperidol] Anxiety    Ketamine Nausea And Vomiting and Anxiety    Methocarbamol Rash    Morphine Hives    Nitrofurantoin Nausea And Vomiting     \"projectile vomiting\"    Prochlorperazine Anxiety    Reglan [Metoclopramide] Anxiety    Silicone Hives, Swelling and Rash    Tazobactam Nausea And Vomiting and Anxiety      Diet ADULT DIET; Regular   Isolation Contact     CURRENT SCHEDULED MEDICATIONS   Inpatient Medications     gabapentin, 400 mg, Oral, TID    sodium chloride flush, 5-40 mL, IntraVENous, 2 times per day    enoxaparin, 40 mg, SubCUTAneous, Daily   Infusions    sodium chloride        Antibiotics   Recent Abx Admin        No antibiotic orders with administrations found.                       LABS   Metabolic Panel Recent Labs     10/27/22  0228 10/27/22  1557 10/28/22  0535    140 141   K 3.4* 3.6 3.0*    106 105   CO2 21 23 25   BUN 9 10 13   CREATININE 0.6 0.7 0.8   GLUCOSE 108* 107* 127*   CALCIUM 9.4 9.3 9.0   LABALBU 4.4  --   --    MG 2.40  --  2.20   ALKPHOS 113  --   --    ALT 19  --   --    AST 17  --   --       CBC / Coags Recent Labs     10/27/22  0228 10/27/22  1557 10/28/22  0535   WBC 11.4* 8.3 7.1   RBC 5.17 5.03 4.58   HGB 14.6 14.1 13.1   HCT 42.9 43.0 38.9    304 267      Other No results for input(s): LABA1C, LDLCALC, TRIG, TSH, [id number], FOLATE, LABSALI, COVID19 in the last 72 hours. Recent Labs     10/27/22  1552   LACTA 1.2        DIAGNOSTICS   IMAGES:  Images personally reviewed and agree w/ radiology interpretation. MRI Brain w & w/o Contrast:  Impression   1. No acute intracranial abnormality or enhancing mass. 2. Stable left frontal approach ventriculostomy shunt catheter with slitlike lateral ventricles. 3. 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 4. Additional small focus of susceptibility in the posterior medial aspect of right thalamus, likely related to small cavernoma. 5. Mild burden patchy T2 hyperintense white matter disease, nonspecific, may relate to demyelinating disease like multiple sclerosis or chronic microvascular ischemia. No significant change since 6/1/2021. Routine EEG:  pending    Previous imaging:  Head CT w/o Contrast: 10/25/22  Impression   1. Left ventriculoperitoneal shunt coursing into the body left lateral   ventricle with the tip at the midline near the 3rd ventricle. Slit-like   ventricles but are unchanged from the prior exam.  No fragmentation of the   catheter or hematoma within the scalp. 2.  Well maintained brain volume and the overall appearance is stable. XR Shunt Series: 10/25/22  Impression   High left ventriculoperitoneal shunt coursing to the midline brain. A   radiolucent connector in the scalp. Catheter down the scalp, neck, chest,   and into the abdomen without evidence of fragmentation or kinking. Hazy density in the infrahilar lungs could be infectious or inflammatory.          PHYSICAL EXAMINATION     PHYSICAL EXAM:  Vitals:    10/27/22 2119 10/27/22 2215 10/28/22 0230 10/28/22 0630   BP:  129/89 121/76 105/62   Pulse:  85 81 87   Resp: 18 16 16 16   Temp:  98 °F (36.7 °C) 98 °F (36.7 °C) 98 °F (36.7 °C)   TempSrc:  Oral Oral Oral   SpO2:  98% 98% 98%   Weight: Height:             General: Alert, no distress, well-nourished  Neurologic  Mental status:   orientation to person, place, time, situation              Attention intact as able to attend well to the exam                Language fluent in conversation              Comprehension intact; follows simple commands     Cranial nerves:   CN2: Visual fields full w/o extinction on confrontational testing   CN 3,4,6: Pupils equal and reactive to light, extraocular muscles intact  CN5: Facial sensation symmetric   CN7: Face symmetric  CN8: Hearing symmetric to spoken voice  CN9: Palate elevated symmetrically  CN11: Traps full strength on shoulder shrug  CN12: Tongue midline with protrusion     Motor Exam:    R  L    Deltoid 5  5   Biceps 5 5   Triceps 5 5   Wrist extension  5 5   Interossei 5 5        R  L    Hip flexion  5  5   Hip extension  5 5   Knee flexion  5 5   Knee extension  5 5   Ankle dorsiflexion  5 5   Ankle plantar flexion  5 5       Sensory: light touch intact and symmetric in all 4 extremities. No sensory extinction on bilateral simultaneous stimulation  Cerebellar/coordination: finger nose finger normal without ataxia  Tone: normal in all 4 extremities  Gait: held 2/2 patient safety        OTHER SYSTEMS:  Cardiovascular: Warm, appears well perfused   Respiratory: Easy, non-labored respiratory pattern   Abdominal: Abdomen is without distention   Extremities: Upper and lower extremities are atraumatic in appearance without deformity. No swelling or erythema. ASSESSMENT & RECOMMENDATIONS   Assessment:  Michael Shearer is a 39 yo female who presents for multiple episodes of \"feeling fuzzy, having blurred vision, and sometimes losing consciousness\". Patient states she can sometimes remain consciousness when she has this sensation by lying down. Patient had 1 witnessed episode by family. Family member states the patient began hyperventilating with her eyes closed, and the episode lasted ~3min.    MRI brain w and wo contrast shows no acute intracranial abnormality. Plan:  - routine EEG pending  - neuro check per unit routine      FREDRICK Simental - CNP   Neurology & Neurocritical Care   10/28/2022 10:08 AM      ICU Patients:   Neurocritical Care Line: [phone]  [facility]: [facility] Neurocritical Care    Floor / PCU Patients:  Neurology Line: [phone]  [facility]: [facility] Neurology    I spent 25 minutes in the care of this patient. Over 50% of that time was in face-to-face counseling regarding disease process, diagnostic testing, preventative measures, and answering patient and family questions.

## 2022-10-28 NOTE — CARE COORDINATION
Upon review of pts chart, pt is from home, IPTA, no current home services. Pt denies a need for any. Pt has transport at time of dc. CM will sign off at this time. Please consult CM/SW if any dcp needs arise.     Electronically signed by Tommy Leavitt RN on 10/28/2022 at 1:24 PM

## 2022-10-28 NOTE — PLAN OF CARE
Admitted from ED with complaints of headache and seizure like activity. AOX4,RA,VSS, closely monitored for any seizure like activity, monitor at bedside. SBA, call light within reach,feeding adequately,still waiting for the plan to do EEG and MRI. PRN meds for anxiety and itching given. No other complaints at this moment.

## 2022-10-28 NOTE — DISCHARGE SUMMARY
Hospital Medicine Discharge Summary    Patient ID: Manuel Del Rio      Patient's PCP: FREDRICK Duarte - AARON    Admit Date: 10/27/2022     Discharge Date:   10/28/22    Admitting Provider: Parrish Russell DO     Discharge Provider: Beatriz Junior DO     Discharge Diagnoses: Active Hospital Problems    Diagnosis     Seizure-like activity [facility]) [R56.9]      Priority: Medium       The patient was seen and examined on day of discharge and this discharge summary is in conjunction with any daily progress note from day of discharge. Hospital Course: 38 y.o. female with past medical history as below, including DMII, hydrocephalus with  shunt, PCOS, who presents with seizure like activity that started Monday. Patient was recently seen at [facility] for similar complaint. She says these episodes started with no provoking factor, no recent fever or infection. She describes shaking episodes. No tongue biting. She thinks she did have an episode of urinary incontinence. She reports feeling sore all over.  Per report family witnessed the event, reported that patient had eyes closed, no post-ictal confusion       Seizure-like activity ([facility] [address] [R56.9] 10/27/2022       Priority: Medium         PLAN:     Seizure like activity  Neurology consulted  Seizure precautions  CK  Monitor electrolytes  UA negative  MRI brain w wo contrast pretty much neg  EEG   Discussed with neurology, they have low suspicion for true seizure due to lack of post ictal confusion  They think patient can be discharged without any seizure medication for now     Anxiety  Patient reports her regular physician planning to start xanax  Will give diazepam for MRI     Chronic pain  Continue home neurontin and oxycodone     DVT Prophylaxis: Lovenox  Diet: No diet orders on file  Code Status: Prior     PT/OT Eval Status: pending     Dispo - Inpatient           Physical Exam Performed:     /61   Pulse 88   Temp 98.8 °F (37.1 °C) (Oral)   Resp 16   Ht 4' 11\" (1.499 m)   Wt 153 lb (69.4 kg)   LMP 10/31/2016 (Exact Date)   SpO2 96%   BMI 30.90 kg/m²       General appearance:  No apparent distress, appears stated age and cooperative. HEENT:  Normal cephalic, atraumatic without obvious deformity. Pupils equal, round, and reactive to light. Extra ocular muscles intact. Conjunctivae/corneas clear. Neck: Supple, with full range of motion. No jugular venous distention. Trachea midline. Respiratory:  Normal respiratory effort. Clear to auscultation, bilaterally without Rales/Wheezes/Rhonchi. Cardiovascular:  Regular rate and rhythm with normal S1/S2 without murmurs, rubs or gallops. Abdomen: Soft, non-tender, non-distended with normal bowel sounds. Musculoskeletal:  No clubbing, cyanosis or edema bilaterally. Full range of motion without deformity. Skin: Skin color, texture, turgor normal.  No rashes or lesions. Neurologic:  Neurovascularly intact without any focal sensory/motor deficits. Cranial nerves: II-XII intact, grossly non-focal.  Psychiatric:  Alert and oriented, thought content appropriate, normal insight  Capillary Refill: Brisk,< 3 seconds   Peripheral Pulses: +2 palpable, equal bilaterally       Labs: For convenience and continuity at follow-up the following most recent labs are provided:      CBC:    Lab Results   Component Value Date/Time    WBC 7.1 10/28/2022 05:35 AM    HGB 13.1 10/28/2022 05:35 AM    HCT 38.9 10/28/2022 05:35 AM     10/28/2022 05:35 AM       Renal:    Lab Results   Component Value Date/Time     10/28/2022 05:35 AM    K 3.0 10/28/2022 05:35 AM     10/28/2022 05:35 AM    CO2 25 10/28/2022 05:35 AM    BUN 13 10/28/2022 05:35 AM    CREATININE 0.8 10/28/2022 05:35 AM    CALCIUM 9.0 10/28/2022 05:35 AM    PHOS 2.8 10/11/2022 06:01 AM         Significant Diagnostic Studies    Radiology:   MRI BRAIN W WO CONTRAST   Final Result      1. No acute intracranial abnormality or enhancing mass.    2. Stable left frontal approach ventriculostomy shunt catheter with slitlike lateral ventricles. 3. 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 4. Additional small focus of susceptibility in the posterior medial aspect of right thalamus, likely related to small cavernoma. 5. Mild burden patchy T2 hyperintense white matter disease, nonspecific, may relate to demyelinating disease like multiple sclerosis or chronic microvascular ischemia. No significant change since 6/1/2021. Consults:     IP CONSULT TO NEUROLOGY  IP CONSULT TO NEUROLOGY  IP CONSULT TO HOSPITALIST    Disposition:  home     Condition at Discharge: Stable    Discharge Instructions/Follow-up:  neurology    Code Status:  Full Code     Activity: activity as tolerated    Diet: regular diet      Discharge Medications:     Current Discharge Medication List             Details   ondansetron (ZOFRAN ODT) 4 MG disintegrating tablet Take 1 tablet by mouth every 12 hours as needed for Nausea  Qty: 12 tablet, Refills: 1      lisdexamfetamine (VYVANSE) 50 MG capsule Take 1 capsule by mouth every morning for 7 days. Qty: 7 capsule, Refills: 0    Associated Diagnoses: Attention deficit hyperactivity disorder (ADHD), unspecified ADHD type      oxyCODONE (ROXICODONE) 5 MG immediate release tablet Take 5 mg by mouth every 6 hours as needed for Pain.      hydrOXYzine HCl (ATARAX) 25 MG tablet Take 25 mg by mouth 3 times daily as needed for Itching      albuterol sulfate HFA (VENTOLIN HFA) 108 (90 Base) MCG/ACT inhaler Inhale 2 puffs into the lungs 4 times daily as needed for Wheezing  Qty: 54 g, Refills: 1    Associated Diagnoses: COVID; Cough      gabapentin (NEURONTIN) 400 MG capsule Take 400 mg by mouth 3 times daily. Time Spent on discharge is more than 45 minutes in the examination, evaluation, counseling and review of medications and discharge plan. Signed:     Priscilla Nj DO   10/28/2022      Thank you Nathan6 FREDRICK Valente CNP for the opportunity to be involved in this patient's care. If you have any questions or concerns, please feel free to contact me at [phone].

## 2022-10-28 NOTE — PROGRESS NOTES
Hospitalist Progress Note      PCP: FREDRICK Duran CNP    Date of Admission: 10/27/2022    Chief Complaint: seizure like activity    Hospital Course: Amalia German is a 38 y.o. female with past medical history as below, including DMII, hydrocephalus with  shunt, PCOS, who presents with seizure like activity that started Monday. Patient was recently seen at [facility] for similar complaint. She says these episodes started with no provoking factor, no recent fever or infection. She describes shaking episodes. No tongue biting. She thinks she did have an episode of urinary incontinence. She reports feeling sore all over. Per report family witnessed the event, reported that patient had eyes closed, no post-ictal confusion. Subjective: sleepy due to sedating meds for MRI but otherwise doing well       Medications:  Reviewed    Infusion Medications    sodium chloride       Scheduled Medications    gabapentin  400 mg Oral TID    sodium chloride flush  5-40 mL IntraVENous 2 times per day    enoxaparin  40 mg SubCUTAneous Daily     PRN Meds: oxyCODONE, sodium chloride flush, sodium chloride, polyethylene glycol, promethazine **OR** ondansetron, diphenhydrAMINE      Intake/Output Summary (Last 24 hours) at 10/28/2022 1536  Last data filed at 10/28/2022 1500  Gross per 24 hour   Intake 135 ml   Output 1 ml   Net 134 ml       Physical Exam Performed:    BP (!) 90/57   Pulse 82   Temp 98.5 °F (36.9 °C) (Oral)   Resp 16   Ht 4' 11\" (1.499 m)   Wt 153 lb (69.4 kg)   LMP 10/31/2016 (Exact Date)   SpO2 96%   BMI 30.90 kg/m²     General appearance: No apparent distress, appears stated age and cooperative. HEENT: Pupils equal, round, and reactive to light. Conjunctivae/corneas clear. Neck: Supple, with full range of motion. No jugular venous distention. Trachea midline. Respiratory:  Normal respiratory effort. Clear to auscultation, bilaterally without Rales/Wheezes/Rhonchi.   Cardiovascular: Regular rate and rhythm with normal S1/S2 without murmurs, rubs or gallops. Abdomen: Soft, non-tender, non-distended with normal bowel sounds. Musculoskeletal: No clubbing, cyanosis or edema bilaterally. Full range of motion without deformity. Skin: Skin color, texture, turgor normal.  No rashes or lesions. Neurologic:  Neurovascularly intact without any focal sensory/motor deficits. Cranial nerves: II-XII intact, grossly non-focal.  Psychiatric: Alert and oriented, thought content appropriate, normal insight  Capillary Refill: Brisk, 3 seconds, normal   Peripheral Pulses: +2 palpable, equal bilaterally       Labs:   Recent Labs     10/27/22  0228 10/27/22  1557 10/28/22  0535   WBC 11.4* 8.3 7.1   HGB 14.6 14.1 13.1   HCT 42.9 43.0 38.9    304 267     Recent Labs     10/27/22  0228 10/27/22  1557 10/28/22  0535    140 141   K 3.4* 3.6 3.0*    106 105   CO2 21 23 25   BUN 9 10 13   CREATININE 0.6 0.7 0.8   CALCIUM 9.4 9.3 9.0     Recent Labs     10/27/22  0228   AST 17   ALT 19   BILITOT 0.5   ALKPHOS 113     No results for input(s): INR in the last 72 hours. Recent Labs     10/27/22  2143   CKTOTAL 106       Urinalysis:      Lab Results   Component Value Date/Time    NITRU Negative 10/27/2022 03:57 PM    [address] 09/21/2022 10:38 AM    BACTERIA 4+ 09/21/2022 10:38 AM    RBCUA 0-2 09/21/2022 10:38 AM    BLOODU Negative 10/27/2022 03:57 PM    SPECGRAV 1.020 10/27/2022 03:57 PM    GLUCOSEU Negative 10/27/2022 03:57 PM    GLUCOSEU NEGATIVE 10/08/2011 10:10 PM       Radiology:  MRI BRAIN W WO CONTRAST   Final Result      1. No acute intracranial abnormality or enhancing mass. 2. Stable left frontal approach ventriculostomy shunt catheter with slitlike lateral ventricles. 3. 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    4. Additional small focus of susceptibility in the posterior medial aspect of right thalamus, likely related to small cavernoma. 5. Mild burden patchy T2 hyperintense white matter disease, nonspecific, may relate to demyelinating disease like multiple sclerosis or chronic microvascular ischemia. No significant change since 6/1/2021. Assessment/Plan:    Active Hospital Problems    Diagnosis     Seizure-like activity ([facility] [address] [R56.9]      Priority: Medium      Seizure-like activity ([address] [R56.9] 10/27/2022       Priority: Medium    hydrocephalus s/p  shunt  diabetes, migraines, PCOS     PLAN:     Seizure like activity  Neurology consulted  Seizure precautions  CK  Monitor electrolytes  UA negative  MRI brain w wo contrast neg  EEG pending    DMII  - sliding scale     Anxiety  Patient reports her regular physician planning to start xanax  Will give diazepam for MRI     Chronic pain  Continue home neurontin and oxycodone     DVT Prophylaxis: Lovenox  Diet: No diet orders on file  Code Status: Prior     PT/OT Eval Status: pending     Dispo - Inpatient       DVT Prophylaxis: lovenox  Diet: ADULT DIET;  Regular  Code Status: Full Code  PT/OT Eval Status: Pending    Dispo - wards        Demetri Angel DO

## 2022-10-29 VITALS
OXYGEN SATURATION: 98 % | RESPIRATION RATE: 16 BRPM | DIASTOLIC BLOOD PRESSURE: 74 MMHG | HEIGHT: 59 IN | TEMPERATURE: 98 F | HEART RATE: 65 BPM | WEIGHT: 153 LBS | SYSTOLIC BLOOD PRESSURE: 119 MMHG | BODY MASS INDEX: 30.84 KG/M2

## 2022-10-29 LAB
GLUCOSE BLD-MCNC: 114 MG/DL (ref 70–99)
PERFORMED ON: ABNORMAL

## 2022-10-29 PROCEDURE — 6360000002 HC RX W HCPCS: Performed by: INTERNAL MEDICINE

## 2022-10-29 PROCEDURE — 2580000003 HC RX 258: Performed by: INTERNAL MEDICINE

## 2022-10-29 PROCEDURE — 6370000000 HC RX 637 (ALT 250 FOR IP): Performed by: INTERNAL MEDICINE

## 2022-10-29 RX ORDER — DIPHENHYDRAMINE HYDROCHLORIDE 50 MG/ML
25 INJECTION INTRAMUSCULAR; INTRAVENOUS ONCE
Status: COMPLETED | OUTPATIENT
Start: 2022-10-29 | End: 2022-10-29

## 2022-10-29 RX ORDER — DIPHENHYDRAMINE HCL 25 MG
25 TABLET ORAL ONCE
Status: COMPLETED | OUTPATIENT
Start: 2022-10-29 | End: 2022-10-29

## 2022-10-29 RX ADMIN — DIPHENHYDRAMINE HYDROCHLORIDE 25 MG: 50 INJECTION, SOLUTION INTRAMUSCULAR; INTRAVENOUS at 10:40

## 2022-10-29 RX ADMIN — GABAPENTIN 400 MG: 400 CAPSULE ORAL at 10:40

## 2022-10-29 RX ADMIN — OXYCODONE 5 MG: 5 TABLET ORAL at 10:40

## 2022-10-29 RX ADMIN — DIPHENHYDRAMINE HYDROCHLORIDE 25 MG: 25 TABLET ORAL at 03:21

## 2022-10-29 RX ADMIN — SODIUM CHLORIDE, PRESERVATIVE FREE 10 ML: 5 INJECTION INTRAVENOUS at 10:41

## 2022-10-29 ASSESSMENT — ENCOUNTER SYMPTOMS
ABDOMINAL PAIN: 0
VOMITING: 0
SHORTNESS OF BREATH: 0
ANAL BLEEDING: 0
SORE THROAT: 0
BACK PAIN: 0
EYE PAIN: 0
COUGH: 0
NAUSEA: 1

## 2022-10-29 NOTE — PROGRESS NOTES
Physician Progress Note      Marta Leon  CSN #:                  [id number]  :                       1980  ADMIT DATE:       10/27/2022 12:49 PM  [address] [place] DATE:  Seemaarl Barthel  PROVIDER #:        She Noe DO          QUERY TEXT:    Pt admitted with \"Seizure like activity\". EEG negative, & noted \"low suspicion   for true seizure\". If possible, please document in progress notes and   discharge summary after study the etiology of the seizure: The medical record reflects the following:  Risk Factors: Anxiety  Clinical Indicators: Per d/c sum \"family witnessed the event, reported that   patient had eyes closed, no post-ictal confusion. Discussed with neurology,   they have low suspicion for true seizure due to lack of post ictal confusion. They think patient can be discharged without any seizure medication. Anxiety-   regular physician planning to start xanax\". EEG negative, MRI: non acute   findings. Per family \"concerned for the patient due to her frequent visits to   multiple hospitals for multiple medical issues, and believe she may be   displaying symptoms of Munchausen syndrome\"  Treatment: MRI, Neuro c/s  Options provided:  -- Psychogenic nonepileptic attacks  -- Non-epileptic pseudoseizure with conversion disorder  -- Other - I will add my own diagnosis  -- Disagree - Not applicable / Not valid  -- Disagree - Clinically unable to determine / Unknown  -- Refer to Clinical Documentation Reviewer    PROVIDER RESPONSE TEXT:    Provider is clinically unable to determine a response to this query. We think this is psychogenic nonepileptic attacks but can not prove it since   we did not do cvEEG. A spot EEG can not completely rule out seizure    Query created by: Arlen Hogan on 10/29/2022 8:39 AM      Electronically signed by:   She Noe DO 10/29/2022 9:14 AM

## 2022-10-29 NOTE — PROGRESS NOTES
Patient AX04. Anxious throughout the night. VSS on room air. Soft B/P, SBP   No seizures like activity noted overnight. Patient complaining of persistent itching. PRN medications givens, see MAR.

## 2022-10-29 NOTE — PROCEDURES
Alberto Montes is a 38 y.o. female patient. 1. Convulsions, unspecified convulsion type [facility])      Past Medical History:   Diagnosis Date    ADHD (attention deficit hyperactivity disorder) 1988    Depression with anxiety     Diabetes mellitus ([facility] [address]     pre-diabetes    Difficult intubation     airway swelled up    Encounter for imaging to screen for metal prior to MRI 06/01/2021    MRI Conditional [employer] Non-Programmable shunt model#[id number] implanted 10/30/2020 at [facility]. Normal Mode. 1.5T or 3.0T. ESBL (extended spectrum beta-lactamase) producing bacteria infection 11/06/2019    urine    Functional ovarian cysts 2008    rt ovary cyst x 2 yrs. Headache(784.0)     migraines    History of blood transfusion     at birth    History of kidney stones     History of PCOS     had hysterectomy in 2016    Hydrocephalus [facility])     Hyperlipidemia     Irritable bowel syndrome 2004    had colonoscopy about 6 yrs ago    Meningitis 56/6590    Neutrophilic leukocytosis     Nicotine dependence     PONV (postoperative nausea and vomiting)     very nauseated and sometimes wakes up with a Migraine--happened once after brain surgery    Primary osteoarthritis of left knee 07/01/2016    S/P cone biopsy of cervix 2004    Scoliosis 1990.s    Seizures ([facility] [address]     twice--last one in June2022     (ventriculoperitoneal) shunt status 1982    hydrocephalus f/w Neurosurgeon at [facility]    Wears glasses     reading     Blood pressure 101/65, pulse 60, temperature 98.5 °F (36.9 °C), temperature source Oral, resp. rate 17, height 4' 11\" (1.499 m), weight 153 lb (69.4 kg), last menstrual period 10/31/2016, SpO2 97 %, unknown if currently breastfeeding.     EEG awake and asleep    Date/Time: 10/29/2022 6:06 AM  Performed by: Edna Celaya MD  Authorized by: Peggy Alberts DO     CLINICAL HISTORY:  The patient is a 38 y.o. female with significant past medical history of diabetes, migraines, PCOS, hydrocephalus with  shunt placed in 1982, hyperlipidemia. Patient presents with episodes of her head feeling fuzzy and blurred vision then losing consciousness. Patient states these episodes just started this past Monday. Patient lives with her cousin and her cousin's . They did witness 1 episode when she seemed to be hyperventilating with her eyes closed and not responding. Clinical concern is for complex partial or simple partial seizures. FINDINGS: This is a routine 16 channel referential and bipolar video EEG. There is a background rhythm in the alpha range, with a posterior dominant rhythm that attenuates appropriately with eye opening. Photic stimulation is performed without driving or abnormality. Hyperventilation is not performed. No epileptiform abnormalities are noted. No electrographic seizures are recorded. There is no asymmetry. IMPRESSION:  This is a normal EEG in the awake, and drowsy, states. No focal or epileptiform abnormalities.         Reba Lovett MD  10/29/2022

## 2022-10-29 NOTE — DISCHARGE SUMMARY
Hospital Medicine Discharge Summary    Patient ID: Alberto Montes      Patient's PCP: FREDRICK Coronado - AARON    Admit Date: 10/27/2022     Discharge Date:   10/29/22      Admitting Provider: Peggy Alberts DO     Discharge Provider: Binh Hunter DO     Discharge Diagnoses: Active Hospital Problems    Diagnosis     Seizure-like activity [facility]) [R56.9]      Priority: Medium       The patient was seen and examined on day of discharge and this discharge summary is in conjunction with any daily progress note from day of discharge. Hospital Course: Alberto Montes is a 38 y.o. female with past medical history as below, including DMII, hydrocephalus with  shunt, PCOS, who presents with seizure like activity that started Monday. Patient was recently seen at [facility] for similar complaint. She says these episodes started with no provoking factor, no recent fever or infection. She describes shaking episodes. No tongue biting. She thinks she did have an episode of urinary incontinence. She reports feeling sore all over. Per report family witnessed the event, reported that patient had eyes closed, no post-ictal confusion.       Seizure-like activity ([address] [R56.9]         Priority: Medium             Seizure-like activity ([address] [R56.9] 10/27/2022       Priority: Medium    hydrocephalus s/p  shunt  diabetes, migraines, PCOS     PLAN:     Seizure like activity  Neurology consulted  Seizure precautions  CK  Monitor electrolytes  UA negative  MRI brain w wo contrast neg  EEG neg  - discussed with neurology ok to discharge home  -      DMII  - sliding scale     Anxiety  Patient reports her regular physician planning to start xanax  Will give diazepam for MRI     Chronic pain  Continue home neurontin and oxycodone       DVT Prophylaxis: Lovenox  Diet: No diet orders on file  Code Status: Prior     PT/OT Eval Status: pending     Dispo - Inpatient               Physical Exam Performed:     BP (!) 98/58   Pulse 60   Temp 98 °F (36.7 °C) (Oral)   Resp 16   Ht 4' 11\" (1.499 m)   Wt 153 lb (69.4 kg)   LMP 10/31/2016 (Exact Date)   SpO2 97%   BMI 30.90 kg/m²       General appearance:  No apparent distress, appears stated age and cooperative. HEENT:  Normal cephalic, atraumatic without obvious deformity. Pupils equal, round, and reactive to light. Extra ocular muscles intact. Conjunctivae/corneas clear. Neck: Supple, with full range of motion. No jugular venous distention. Trachea midline. Respiratory:  Normal respiratory effort. Clear to auscultation, bilaterally without Rales/Wheezes/Rhonchi. Cardiovascular:  Regular rate and rhythm with normal S1/S2 without murmurs, rubs or gallops. Abdomen: Soft, non-tender, non-distended with normal bowel sounds. Musculoskeletal:  No clubbing, cyanosis or edema bilaterally. Full range of motion without deformity. Skin: Skin color, texture, turgor normal.  No rashes or lesions. Neurologic:  Neurovascularly intact without any focal sensory/motor deficits. Cranial nerves: II-XII intact, grossly non-focal.  Psychiatric:  Alert and oriented, thought content appropriate, normal insight  Capillary Refill: Brisk,< 3 seconds   Peripheral Pulses: +2 palpable, equal bilaterally       Labs: For convenience and continuity at follow-up the following most recent labs are provided:      CBC:    Lab Results   Component Value Date/Time    WBC 7.1 10/28/2022 05:35 AM    HGB 13.1 10/28/2022 05:35 AM    HCT 38.9 10/28/2022 05:35 AM     10/28/2022 05:35 AM       Renal:    Lab Results   Component Value Date/Time     10/28/2022 05:35 AM    K 3.0 10/28/2022 05:35 AM     10/28/2022 05:35 AM    CO2 25 10/28/2022 05:35 AM    BUN 13 10/28/2022 05:35 AM    CREATININE 0.8 10/28/2022 05:35 AM    CALCIUM 9.0 10/28/2022 05:35 AM    PHOS 2.8 10/11/2022 06:01 AM         Significant Diagnostic Studies    Radiology:   MRI BRAIN W WO CONTRAST   Final Result      1.  No acute intracranial abnormality or enhancing mass. 2. Stable left frontal approach ventriculostomy shunt catheter with slitlike lateral ventricles. 3. 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 4. Additional small focus of susceptibility in the posterior medial aspect of right thalamus, likely related to small cavernoma. 5. Mild burden patchy T2 hyperintense white matter disease, nonspecific, may relate to demyelinating disease like multiple sclerosis or chronic microvascular ischemia. No significant change since 6/1/2021. Consults:     IP CONSULT TO NEUROLOGY  IP CONSULT TO NEUROLOGY  IP CONSULT TO HOSPITALIST    Disposition:  home     Condition at Discharge: Stable    Discharge Instructions/Follow-up:  PCP    Code Status:  Full Code     Activity: activity as tolerated    Diet: regular diet      Discharge Medications:     Current Discharge Medication List             Details   ondansetron (ZOFRAN ODT) 4 MG disintegrating tablet Take 1 tablet by mouth every 12 hours as needed for Nausea  Qty: 12 tablet, Refills: 1      lisdexamfetamine (VYVANSE) 50 MG capsule Take 1 capsule by mouth every morning for 7 days. Qty: 7 capsule, Refills: 0    Associated Diagnoses: Attention deficit hyperactivity disorder (ADHD), unspecified ADHD type      oxyCODONE (ROXICODONE) 5 MG immediate release tablet Take 5 mg by mouth every 6 hours as needed for Pain.      hydrOXYzine HCl (ATARAX) 25 MG tablet Take 25 mg by mouth 3 times daily as needed for Itching      albuterol sulfate HFA (VENTOLIN HFA) 108 (90 Base) MCG/ACT inhaler Inhale 2 puffs into the lungs 4 times daily as needed for Wheezing  Qty: 54 g, Refills: 1    Associated Diagnoses: COVID; Cough      gabapentin (NEURONTIN) 400 MG capsule Take 400 mg by mouth 3 times daily.              Time Spent on discharge is more than 45 minutes in the examination, evaluation, counseling and review of medications and discharge plan.      Signed: Sagar Nava DO   10/29/2022      Thank you FREDRICK Meeks CNP for the opportunity to be involved in this patient's care. If you have any questions or concerns, please feel free to contact me at [phone].

## 2022-11-07 ENCOUNTER — APPOINTMENT (OUTPATIENT)
Dept: CT IMAGING | Age: 42
End: 2022-11-07
Payer: COMMERCIAL

## 2022-11-07 ENCOUNTER — APPOINTMENT (OUTPATIENT)
Dept: GENERAL RADIOLOGY | Age: 42
End: 2022-11-07
Payer: COMMERCIAL

## 2022-11-07 ENCOUNTER — HOSPITAL ENCOUNTER (EMERGENCY)
Age: 42
Discharge: HOME | End: 2022-11-07
Attending: EMERGENCY MEDICINE
Payer: COMMERCIAL

## 2022-11-07 VITALS
RESPIRATION RATE: 17 BRPM | BODY MASS INDEX: 31.33 KG/M2 | HEART RATE: 109 BPM | HEIGHT: 59 IN | OXYGEN SATURATION: 95 % | DIASTOLIC BLOOD PRESSURE: 80 MMHG | WEIGHT: 155.42 LBS | TEMPERATURE: 99.3 F | SYSTOLIC BLOOD PRESSURE: 136 MMHG

## 2022-11-07 DIAGNOSIS — J10.1 INFLUENZA A: Primary | ICD-10-CM

## 2022-11-07 DIAGNOSIS — R56.9 SEIZURE (HCC): ICD-10-CM

## 2022-11-07 LAB
A/G RATIO: 1.2 (ref 1.1–2.2)
ALBUMIN SERPL-MCNC: 4.2 G/DL (ref 3.4–5)
ALP BLD-CCNC: 115 U/L (ref 40–129)
ALT SERPL-CCNC: 29 U/L (ref 10–40)
ANION GAP SERPL CALCULATED.3IONS-SCNC: 15 MMOL/L (ref 3–16)
AST SERPL-CCNC: 15 U/L (ref 15–37)
BASOPHILS ABSOLUTE: 0 K/UL (ref 0–0.2)
BASOPHILS RELATIVE PERCENT: 0.5 %
BILIRUB SERPL-MCNC: <0.2 MG/DL (ref 0–1)
BILIRUBIN URINE: NEGATIVE
BLOOD, URINE: NEGATIVE
BUN BLDV-MCNC: 16 MG/DL (ref 7–20)
CALCIUM SERPL-MCNC: 9.6 MG/DL (ref 8.3–10.6)
CHLORIDE BLD-SCNC: 102 MMOL/L (ref 99–110)
CLARITY: CLEAR
CO2: 21 MMOL/L (ref 21–32)
COLOR: YELLOW
CREAT SERPL-MCNC: 0.6 MG/DL (ref 0.6–1.1)
D DIMER: 0.3 UG/ML FEU (ref 0–0.6)
EKG ATRIAL RATE: 91 BPM
EKG DIAGNOSIS: NORMAL
EKG P AXIS: 41 DEGREES
EKG P-R INTERVAL: 128 MS
EKG Q-T INTERVAL: 368 MS
EKG QRS DURATION: 80 MS
EKG QTC CALCULATION (BAZETT): 452 MS
EKG R AXIS: -1 DEGREES
EKG T AXIS: 32 DEGREES
EKG VENTRICULAR RATE: 91 BPM
EOSINOPHILS ABSOLUTE: 0.1 K/UL (ref 0–0.6)
EOSINOPHILS RELATIVE PERCENT: 1.1 %
GFR SERPL CREATININE-BSD FRML MDRD: >60 ML/MIN/{1.73_M2}
GLUCOSE BLD-MCNC: 110 MG/DL (ref 70–99)
GLUCOSE URINE: NEGATIVE MG/DL
HCG QUALITATIVE: NEGATIVE
HCT VFR BLD CALC: 45.4 % (ref 36–48)
HEMOGLOBIN: 15.3 G/DL (ref 12–16)
KETONES, URINE: NEGATIVE MG/DL
LACTIC ACID, SEPSIS: 1 MMOL/L (ref 0.4–1.9)
LEUKOCYTE ESTERASE, URINE: NEGATIVE
LYMPHOCYTES ABSOLUTE: 1.9 K/UL (ref 1–5.1)
LYMPHOCYTES RELATIVE PERCENT: 30.6 %
MCH RBC QN AUTO: 28.7 PG (ref 26–34)
MCHC RBC AUTO-ENTMCNC: 33.8 G/DL (ref 31–36)
MCV RBC AUTO: 84.9 FL (ref 80–100)
MICROSCOPIC EXAMINATION: NORMAL
MONOCYTES ABSOLUTE: 0.4 K/UL (ref 0–1.3)
MONOCYTES RELATIVE PERCENT: 6.8 %
NEUTROPHILS ABSOLUTE: 3.8 K/UL (ref 1.7–7.7)
NEUTROPHILS RELATIVE PERCENT: 61 %
NITRITE, URINE: NEGATIVE
PDW BLD-RTO: 15.8 % (ref 12.4–15.4)
PH UA: 6 (ref 5–8)
PLATELET # BLD: 218 K/UL (ref 135–450)
PMV BLD AUTO: 7.7 FL (ref 5–10.5)
POTASSIUM REFLEX MAGNESIUM: 4.1 MMOL/L (ref 3.5–5.1)
PROTEIN UA: NEGATIVE MG/DL
RBC # BLD: 5.35 M/UL (ref 4–5.2)
SODIUM BLD-SCNC: 138 MMOL/L (ref 136–145)
SPECIFIC GRAVITY UA: 1.01 (ref 1–1.03)
TOTAL PROTEIN: 7.6 G/DL (ref 6.4–8.2)
TROPONIN: <0.01 NG/ML
URINE REFLEX TO CULTURE: NORMAL
URINE TYPE: NORMAL
UROBILINOGEN, URINE: 0.2 E.U./DL
WBC # BLD: 6.3 K/UL (ref 4–11)

## 2022-11-07 PROCEDURE — 96375 TX/PRO/DX INJ NEW DRUG ADDON: CPT

## 2022-11-07 PROCEDURE — 93010 ELECTROCARDIOGRAM REPORT: CPT | Performed by: INTERNAL MEDICINE

## 2022-11-07 PROCEDURE — 2580000003 HC RX 258: Performed by: EMERGENCY MEDICINE

## 2022-11-07 PROCEDURE — 84703 CHORIONIC GONADOTROPIN ASSAY: CPT

## 2022-11-07 PROCEDURE — 85025 COMPLETE CBC W/AUTO DIFF WBC: CPT

## 2022-11-07 PROCEDURE — 83605 ASSAY OF LACTIC ACID: CPT

## 2022-11-07 PROCEDURE — 96365 THER/PROPH/DIAG IV INF INIT: CPT

## 2022-11-07 PROCEDURE — 85379 FIBRIN DEGRADATION QUANT: CPT

## 2022-11-07 PROCEDURE — 71045 X-RAY EXAM CHEST 1 VIEW: CPT

## 2022-11-07 PROCEDURE — 81003 URINALYSIS AUTO W/O SCOPE: CPT

## 2022-11-07 PROCEDURE — 99285 EMERGENCY DEPT VISIT HI MDM: CPT

## 2022-11-07 PROCEDURE — 6360000002 HC RX W HCPCS: Performed by: EMERGENCY MEDICINE

## 2022-11-07 PROCEDURE — 80053 COMPREHEN METABOLIC PANEL: CPT

## 2022-11-07 PROCEDURE — 70450 CT HEAD/BRAIN W/O DYE: CPT

## 2022-11-07 PROCEDURE — 84484 ASSAY OF TROPONIN QUANT: CPT

## 2022-11-07 PROCEDURE — 93005 ELECTROCARDIOGRAM TRACING: CPT | Performed by: EMERGENCY MEDICINE

## 2022-11-07 PROCEDURE — 87040 BLOOD CULTURE FOR BACTERIA: CPT

## 2022-11-07 RX ORDER — BENZONATATE 100 MG/1
100 CAPSULE ORAL 3 TIMES DAILY PRN
Qty: 21 CAPSULE | Refills: 0 | Status: SHIPPED | OUTPATIENT
Start: 2022-11-07 | End: 2022-11-14

## 2022-11-07 RX ORDER — ONDANSETRON 2 MG/ML
4 INJECTION INTRAMUSCULAR; INTRAVENOUS ONCE
Status: COMPLETED | OUTPATIENT
Start: 2022-11-07 | End: 2022-11-07

## 2022-11-07 RX ORDER — OSELTAMIVIR PHOSPHATE 75 MG/1
75 CAPSULE ORAL 2 TIMES DAILY
Qty: 10 CAPSULE | Refills: 0 | Status: SHIPPED | OUTPATIENT
Start: 2022-11-07 | End: 2022-11-07

## 2022-11-07 RX ORDER — ONDANSETRON 4 MG/1
4 TABLET, ORALLY DISINTEGRATING ORAL EVERY 8 HOURS PRN
Qty: 20 TABLET | Refills: 0 | Status: SHIPPED | OUTPATIENT
Start: 2022-11-07

## 2022-11-07 RX ORDER — LEVETIRACETAM 500 MG/1
1000 TABLET ORAL 2 TIMES DAILY
Qty: 60 TABLET | Refills: 3 | Status: SHIPPED | OUTPATIENT
Start: 2022-11-07 | End: 2022-11-27 | Stop reason: SDUPTHER

## 2022-11-07 RX ORDER — LEVETIRACETAM 10 MG/ML
1000 INJECTION INTRAVASCULAR ONCE
Status: COMPLETED | OUTPATIENT
Start: 2022-11-07 | End: 2022-11-07

## 2022-11-07 RX ORDER — HYDROCODONE BITARTRATE AND ACETAMINOPHEN 5; 325 MG/1; MG/1
1 TABLET ORAL ONCE
Status: DISCONTINUED | OUTPATIENT
Start: 2022-11-07 | End: 2022-11-07 | Stop reason: HOSPADM

## 2022-11-07 RX ORDER — 0.9 % SODIUM CHLORIDE 0.9 %
1000 INTRAVENOUS SOLUTION INTRAVENOUS ONCE
Status: COMPLETED | OUTPATIENT
Start: 2022-11-07 | End: 2022-11-07

## 2022-11-07 RX ADMIN — ONDANSETRON 4 MG: 2 INJECTION INTRAMUSCULAR; INTRAVENOUS at 17:41

## 2022-11-07 RX ADMIN — LEVETIRACETAM 1000 MG: 10 INJECTION INTRAVENOUS at 16:22

## 2022-11-07 RX ADMIN — SODIUM CHLORIDE 1000 ML: 9 INJECTION, SOLUTION INTRAVENOUS at 16:19

## 2022-11-07 ASSESSMENT — PAIN - FUNCTIONAL ASSESSMENT
PAIN_FUNCTIONAL_ASSESSMENT: NONE - DENIES PAIN
PAIN_FUNCTIONAL_ASSESSMENT: 0-10

## 2022-11-07 ASSESSMENT — PAIN DESCRIPTION - LOCATION: LOCATION: HEAD

## 2022-11-07 ASSESSMENT — PAIN SCALES - GENERAL: PAINLEVEL_OUTOF10: 3

## 2022-11-07 NOTE — ED PROVIDER NOTES
urine    Functional ovarian cysts 2008    rt ovary cyst x 2 yrs.     Headache(784.0)     migraines    History of blood transfusion     at birth    History of kidney stones     History of PCOS     had hysterectomy in 2016    Hydrocephalus ([address]     Hyperlipidemia     Irritable bowel syndrome 2004    had colonoscopy about 6 yrs ago    Meningitis     Neutrophilic leukocytosis     Nicotine dependence     PONV (postoperative nausea and vomiting)     very nauseated and sometimes wakes up with a Migraine--happened once after brain surgery    Primary osteoarthritis of left knee 2016    S/P cone biopsy of cervix     Scoliosis .s    Seizures ([address]     twice--last one in 2022     (ventriculoperitoneal) shunt status     hydrocephalus f/w Neurosurgeon at [facility]    Wears glasses     reading         SURGICAL HISTORY       Past Surgical History:   Procedure Laterality Date    ABDOMEN SURGERY N/A 2021    REMOVAL OF ABDOMINAL WALL MASS performed by Cary Ordonez MD at [facility]      appendicitis     SECTION      placenta previa    CHOLECYSTECTOMY      COLONOSCOPY      COLPOSCOPY      CSF SHUNT      replaced at age 17    CYSTOSCOPY      stone removal    HEMORRHOID SURGERY      HERNIA REPAIR Bilateral     inguinal    HERNIA REPAIR  2015    hiatal hernia    HYSTERECTOMY, TOTAL ABDOMINAL (CERVIX REMOVED)  2016    TAHBSO, adhesions/PCOS    KNEE ARTHROSCOPY Left 2015    medial meniscectomy, chondroplasty, plica resection    LITHOTRIPSY Bilateral 12/10/2019    OTHER SURGICAL HISTORY  10/19/2016    op lap    VENTRAL HERNIA REPAIR N/A 2022    LAPAROSCOPIC LYSIS OF ADHESIONS AND ABDOMINAL WALL MASS REMOVAL performed by Cary Ordonez MD at [place] [id number]      multiple revisions, most recent          CURRENT MEDICATIONS       Previous Medications    ALBUTEROL SULFATE HFA (VENTOLIN HFA) 108 (90 BASE) MCG/ACT INHALER    Inhale 2 puffs into the lungs 4 times daily as needed for Wheezing    GABAPENTIN (NEURONTIN) 400 MG CAPSULE    Take 400 mg by mouth 3 times daily. HYDROXYZINE HCL (ATARAX) 25 MG TABLET    Take 25 mg by mouth 3 times daily as needed for Itching    LISDEXAMFETAMINE (VYVANSE) 50 MG CAPSULE    Take 1 capsule by mouth every morning for 7 days. OXYCODONE (ROXICODONE) 5 MG IMMEDIATE RELEASE TABLET    Take 5 mg by mouth every 6 hours as needed for Pain.        ALLERGIES     Bee venom, Bentyl [dicyclomine hcl], Dicyclomine, Ketorolac tromethamine, Levofloxacin, Maitake, Shiitake mushroom, Sulfa antibiotics, Vancomycin, Zosyn [piperacillin sod-tazobactam so], Adhesive tape, Oxycodone-acetaminophen, Sulfacetamide, Acetaminophen, Butalbital-apap-caff-cod, Ceftaroline, Daptomycin, Fosfomycin tromethamine, Haldol [haloperidol], Ketamine, Methocarbamol, Morphine, Nitrofurantoin, Prochlorperazine, Reglan [metoclopramide], Silicone, and Tazobactam    FAMILY HISTORY       Family History   Problem Relation Age of Onset    High Blood Pressure Mother     Diabetes Mother     High Cholesterol Mother     Depression Mother     Diabetes Maternal Grandmother     High Blood Pressure Maternal Grandmother     High Cholesterol Maternal Grandmother     Heart Disease Maternal Grandfather     High Blood Pressure Maternal Grandfather     Heart Disease Paternal Grandmother     Stroke Paternal Grandfather     Depression Sister     Cirrhosis Father     Rheum Arthritis Neg Hx     Osteoarthritis Neg Hx     Asthma Neg Hx     Breast Cancer Neg Hx     Cancer Neg Hx     Heart Failure Neg Hx     Hypertension Neg Hx     Migraines Neg Hx     Ovarian Cancer Neg Hx     Rashes/Skin Problems Neg Hx     Seizures Neg Hx     Thyroid Disease Neg Hx           SOCIAL HISTORY       Social History     Socioeconomic History    Marital status: Single   Occupational History    Occupation: disability, SSI    Tobacco Use    Smoking status: Every Day Packs/day: 0.50     Years: 18.00     Pack years: 9.00     Types: Cigarettes    Smokeless tobacco: Never   Vaping Use    Vaping Use: Never used   Substance and Sexual Activity    Alcohol use: No     Alcohol/week: 0.0 standard drinks    Drug use: Never    Sexual activity: Not Currently     Partners: Male       SCREENINGS    Aden Coma Scale  Eye Opening: Spontaneous  Best Verbal Response: Oriented  Best Motor Response: Obeys commands  Aden Coma Scale Score: 15        PHYSICAL EXAM    (up to 7 for level 4, 8 or more for level 5)     ED Triage Vitals [11/07/22 1355]   BP Temp Temp src Heart Rate Resp SpO2 Height Weight   136/80 -- -- (!) 109 17 95 % 4' 11\" (1.499 m) 155 lb 6.8 oz (70.5 kg)         Physical Exam   Constitutional: Awake and alert. Mild discomfort. Awake and alert at the time of my exam.  Answers all questions appropriately. Follows commands appropriately. Does not appear postictal.  Head: No visible evidence of trauma. Left parietal scalp nontender. No soft tissue swelling or erythema. Normocephalic. Eyes: Pupils equal and reactive. No photophobia. Conjunctiva normal.    HENT: Oral mucosa moist.  Airway patent. Pharynx without erythema. Nares were clear. Neck:  Soft and supple. Nontender. No meningeal signs. Heart:  Regular rate and rhythm. No murmur. Lungs:  Clear to auscultation. No wheezes, rales, or ronchi. No conversational dyspnea or accessory muscle use. Chest: Chest wall non-tender. No evidence of trauma. Abdomen:  Soft, nondistended, bowel sounds present. Nontender. No guarding rigidity or rebound. No masses. Musculoskeletal: Extremities non-tender with full range of motion. Radial and dorsalis pedis pulses were intact. No calf tenderness erythema or edema. Neurological: Alert and oriented x 3. Speech clear. No dysarthria. No aphasia. No pronator drift. Cranial nerves II-XII intact. No facial droop. No acute focal motor or sensory deficits.   No visible seizure activity. Skin: Skin is warm and dry. No rash. Lymphatic:  No lympadenopathy. Psychiatric: Normal mood and affect. Behavior is normal.         DIAGNOSTIC RESULTS     EKG: All EKG's are interpreted by the Emergency Department Physician who either signs or Co-signs this chart in the absence of a cardiologist.    Normal sinus rhythm. Rate 91. AL interval 128 ms. QRS duration 80 ms. QTc 452 ms. R axis -1 degrees. Normal EKG. EKG is unchanged from October 27, 2022. RADIOLOGY:   Non-plain film images such as CT, Ultrasound and MRI are read by the radiologist. Plain radiographic images are visualized and preliminarily interpreted by the emergency physician with the below findings:        Interpretation per the Radiologist below, if available at the time of this note:    XR CHEST PORTABLE   Final Result   No acute cardiopulmonary process. CT HEAD WO CONTRAST   Final Result   No acute intracranial abnormality. ED BEDSIDE ULTRASOUND:   Performed by ED Physician - none    LABS:  Labs Reviewed   CBC WITH AUTO DIFFERENTIAL - Abnormal; Notable for the following components:       Result Value    RBC 5.35 (*)     RDW 15.8 (*)     All other components within normal limits   COMPREHENSIVE METABOLIC PANEL W/ REFLEX TO MG FOR LOW K - Abnormal; Notable for the following components:    Glucose 110 (*)     All other components within normal limits   CULTURE, BLOOD 1   CULTURE, BLOOD 2   D-DIMER, QUANTITATIVE   HCG, SERUM, QUALITATIVE   LACTATE, SEPSIS   TROPONIN   URINALYSIS WITH REFLEX TO CULTURE   LACTATE, SEPSIS       All other labs were within normal range or not returned as of this dictation.     EMERGENCY DEPARTMENT COURSE and DIFFERENTIAL DIAGNOSIS/MDM:   Vitals:    Vitals:    11/07/22 1355 11/07/22 1401   BP: 136/80    Pulse: (!) 109    Resp: 17    Temp:  99.3 °F (37.4 °C)   TempSrc:  Oral   SpO2: 95%    Weight: 155 lb 6.8 oz (70.5 kg)    Height: 4' 11\" (1.499 m) MDM        Patient presents with a seizure that occurred prior to arrival.  She did have a aura prior to the seizure which she described as feeling tingly all over which she typically gets with each event. She also reports that she was confused after the event. She does appear to be back to baseline. She answers all questions appropriately and is neurologically intact. She is afebrile. She is mildly tachycardic with a heart rate of 109. She does acknowledge that she has not been sleeping well over the last couple days and in addition, has had decreased appetite and decreased oral intake. She was given normal saline 1 L bolus. Based on the patient's report of new onset seizures over the last couple of months with multiple events, she was loaded on Keppra 1000 mg IV. CT head without contrast was obtained. Prior records were reviewed from [facility] dated November 6, 2022. Influenza A was positive. COVID was negative. RSV was negative. Chest x-ray revealed no acute abnormality. Prior records were reviewed from October 27, 2022, MRI brain with and without contrast.  The radiologist reported no significant change since June 1, 2021. Impression       1. No acute intracranial abnormality or enhancing mass. 2. Stable left frontal approach ventriculostomy shunt catheter with slitlike lateral ventricles. 3. 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 4. Additional small focus of susceptibility in the posterior medial aspect of right thalamus, likely related to small cavernoma. 5. Mild burden patchy T2 hyperintense white matter disease, nonspecific, may relate to demyelinating disease like multiple sclerosis or chronic microvascular ischemia. No significant change since 6/1/2021. The patient was admitted to [facility] on October 27, 2022 and discharged on October 29, 2022.   She presented with seizure activity. MRI brain was reviewed. EEG was reportedly negative. She was sent home on seizure precautions however, seizure medications were not prescribed. Review of prior notes indicate that it was unclear if this was actual seizure activity given the presence of normal EEG. Is this patient to be included in the SEP-1 Core Measure due to severe sepsis or septic shock? No   Exclusion criteria - the patient is NOT to be included for SEP-1 Core Measure due to: Infection is not suspected      REASSESSMENT          5 PM: Patient was reexamined. She is feeling improved. Urinalysis is negative. White blood cell count is normal at 6.3. No suspicion for meningitis. No photophobia. No neck pain or stiffness. Electrolytes are normal.  Glucose is 110. D-dimer is normal.  No suspicion for pulmonary embolus. Lactate is normal.  No evidence of sepsis. Troponin is normal.  Chest x-ray is unremarkable. CT head is negative. I suspect that her headache and shortness of breath are both secondary to influenza. She is well-appearing. She is hemodynamically stable. She is stable for discharge and outpatient management. It is unclear if the patient has had true seizure activity. These events have been recurrent and she has had extensive work-up after review of her recent medical records. She has had recent hospital admission and evaluation by neurology. However, she has not yet followed up with neurology. I will go ahead and prescribe Keppra until she is seen by neurology. She will be placed on 1000 mg twice daily. I advised her to not drive or operate machinery until cleared by physician. She will be given seizure precautions. I advised her to drink plenty of fluids. I recommended Tylenol or ibuprofen as directed for headache or body aches. She has tolerated ibuprofen in the past and no prior history of allergic symptoms to Tylenol.   However, she states that it has caused her to feel anxious in the past.    I advised her to follow-up with her primary care physician in 1 to 2 days for reexamination. If her condition worsens or new symptoms develop, she was advised to return immediately to the emergency department. She will be given a prescription for Zofran, Tessalon. She did receive a Tamiflu prescription from [email or web address] but did not take it because she recalled that she has had previous adverse reaction to Tamiflu. Therefore Tamiflu will not be prescribed. CRITICAL CARE TIME   Total Critical Care time was 0 minutes, excluding separately reportable procedures. There was a high probability of clinically significant/life threatening deterioration in the patient's condition which required my urgent intervention. CONSULTS:  None    PROCEDURES:  Unless otherwise noted below, none     Procedures      FINAL IMPRESSION      1. Influenza A    2. Seizure [facility])          DISPOSITION/PLAN   DISPOSITION Decision To Discharge 11/07/2022 06:14:19 PM      PATIENT REFERRED TO:  Bianka Alamo, [address] [place] [address]  [address] [place] [address]  [phone]    Call today      [address] [place] [address] [phone]  Call today      DISCHARGE MEDICATIONS:  New Prescriptions    BENZONATATE (TESSALON PERLES) 100 MG CAPSULE    Take 1 capsule by mouth 3 times daily as needed for Cough    LEVETIRACETAM (KEPPRA) 500 MG TABLET    Take 2 tablets by mouth 2 times daily    ONDANSETRON (ZOFRAN ODT) 4 MG DISINTEGRATING TABLET    Take 1 tablet by mouth every 8 hours as needed for Nausea     Controlled Substances Monitoring:     RX Monitoring 11/9/2021   Attestation -   Periodic Controlled Substance Monitoring Possible medication side effects, risk of tolerance/dependence & alternative treatments discussed. ;No signs of potential drug abuse or diversion identified.        (Please note that portions of this note were completed with a voice recognition program.  Efforts were made to edit the dictations but occasionally words are mis-transcribed. )    Vanessa Herman DO (electronically signed)  Attending Emergency Physician           Kojo Becker,   11/07/22 DO Simone  11/07/22 8988

## 2022-11-07 NOTE — DISCHARGE INSTRUCTIONS
Do not drive or operate machinery until cleared by a physician. Follow-up with a primary care physician in 1 to 2 days for reexamination. Follow-up with the neurologist as soon as possible. Drink plenty of fluids. Get plenty of rest.    If condition worsens or new symptoms develop, return immediately to the emergency department.

## 2022-11-07 NOTE — Clinical Note
Kelsey Fields was seen and treated in our emergency department on 11/7/2022. She may return to work on 11/14/2022. If you have any questions or concerns, please don't hesitate to call.       Katie Moeller, DO

## 2022-11-08 ENCOUNTER — HOSPITAL ENCOUNTER (EMERGENCY)
Age: 42
Discharge: HOME | End: 2022-11-08
Attending: EMERGENCY MEDICINE
Payer: COMMERCIAL

## 2022-11-08 VITALS
DIASTOLIC BLOOD PRESSURE: 81 MMHG | RESPIRATION RATE: 15 BRPM | OXYGEN SATURATION: 100 % | WEIGHT: 155 LBS | BODY MASS INDEX: 31.25 KG/M2 | HEIGHT: 59 IN | TEMPERATURE: 98.3 F | HEART RATE: 93 BPM | SYSTOLIC BLOOD PRESSURE: 143 MMHG

## 2022-11-08 DIAGNOSIS — R56.9 SEIZURE-LIKE ACTIVITY (HCC): Primary | ICD-10-CM

## 2022-11-08 PROCEDURE — 99283 EMERGENCY DEPT VISIT LOW MDM: CPT

## 2022-11-08 ASSESSMENT — ENCOUNTER SYMPTOMS
SHORTNESS OF BREATH: 0
PHOTOPHOBIA: 0
RESPIRATORY NEGATIVE: 1
GASTROINTESTINAL NEGATIVE: 1
ABDOMINAL PAIN: 0
EYES NEGATIVE: 1
SORE THROAT: 0

## 2022-11-08 ASSESSMENT — PAIN - FUNCTIONAL ASSESSMENT
PAIN_FUNCTIONAL_ASSESSMENT: NONE - DENIES PAIN
PAIN_FUNCTIONAL_ASSESSMENT: NONE - DENIES PAIN

## 2022-11-08 ASSESSMENT — PAIN SCALES - GENERAL: PAINLEVEL_OUTOF10: 0

## 2022-11-08 NOTE — ED NOTES
Pt to ED from home via [facility] after unwitnessed seizure. Per EMS pt's daughter called after finding her post seizure on the floor. Pt is A&Ox4 at this time. Pt states she was getting a headache and laid down to take a nap and woke up here. Pt states she was seen in this ED yesterday for seizures and was unable to make It to the pharmacy to  her medication. Alejo Johnston MD at the bedside.       Pankaj Ring RN  11/08/22 4023

## 2022-11-09 ENCOUNTER — HOSPITAL ENCOUNTER (EMERGENCY)
Age: 42
Discharge: HOME | End: 2022-11-09
Attending: EMERGENCY MEDICINE
Payer: COMMERCIAL

## 2022-11-09 VITALS
RESPIRATION RATE: 15 BRPM | HEART RATE: 87 BPM | HEIGHT: 59 IN | OXYGEN SATURATION: 97 % | BODY MASS INDEX: 34.27 KG/M2 | DIASTOLIC BLOOD PRESSURE: 78 MMHG | WEIGHT: 169.97 LBS | TEMPERATURE: 98.8 F | SYSTOLIC BLOOD PRESSURE: 114 MMHG

## 2022-11-09 DIAGNOSIS — Z00.8 ENCOUNTER FOR MEDICAL ASSESSMENT: Primary | ICD-10-CM

## 2022-11-09 PROCEDURE — 99283 EMERGENCY DEPT VISIT LOW MDM: CPT

## 2022-11-09 PROCEDURE — 6370000000 HC RX 637 (ALT 250 FOR IP): Performed by: PHYSICIAN ASSISTANT

## 2022-11-09 RX ORDER — LEVETIRACETAM 500 MG/1
500 TABLET ORAL ONCE
Status: COMPLETED | OUTPATIENT
Start: 2022-11-09 | End: 2022-11-09

## 2022-11-09 RX ORDER — BUTALBITAL, ACETAMINOPHEN AND CAFFEINE 50; 325; 40 MG/1; MG/1; MG/1
1 TABLET ORAL EVERY 4 HOURS PRN
Status: DISCONTINUED | OUTPATIENT
Start: 2022-11-09 | End: 2022-11-09 | Stop reason: HOSPADM

## 2022-11-09 RX ADMIN — LEVETIRACETAM 500 MG: 500 TABLET ORAL at 01:41

## 2022-11-09 RX ADMIN — BUTALBITAL, ACETAMINOPHEN, AND CAFFEINE 1 TABLET: 50; 325; 40 TABLET ORAL at 01:41

## 2022-11-09 ASSESSMENT — ENCOUNTER SYMPTOMS
COUGH: 0
NAUSEA: 0
EYE DISCHARGE: 0
SHORTNESS OF BREATH: 0
COLOR CHANGE: 0
VOMITING: 0
CONSTIPATION: 0
ABDOMINAL PAIN: 0
EYE ITCHING: 0
CHEST TIGHTNESS: 0

## 2022-11-09 NOTE — ED TRIAGE NOTES
Patient with seizure activity for a total of 15 minutes, 10 minutes as reported by daughter prior to squad arrival and 5 minutes in the presence of squad, patient left this ER several hours ago

## 2022-11-09 NOTE — ED PROVIDER NOTES
[address] [facility]      Pt Name: Alberto Montes  MRN: [id number]  [place] 1980  Date ofevaluation: 11/8/2022  Provider: Beatriz Curry MD    CHIEF COMPLAINT       Chief Complaint   Patient presents with    Seizures         HISTORY OF PRESENT ILLNESS   (Location/Symptom, Timing/Onset,Context/Setting, Quality, Duration, Modifying Factors, Severity)  Note limiting factors. Alberto Montes is a 38 y.o. female  who  has a past medical history of ADHD (attention deficit hyperactivity disorder), Depression with anxiety, Diabetes mellitus ([address] Difficult intubation, Encounter for imaging to screen for metal prior to MRI, ESBL (extended spectrum beta-lactamase) producing bacteria infection, Functional ovarian cysts, Headache(784.0), History of blood transfusion, History of kidney stones, History of PCOS, Hydrocephalus ([address] Hyperlipidemia, Irritable bowel syndrome, Meningitis, Neutrophilic leukocytosis, Nicotine dependence, PONV (postoperative nausea and vomiting), Primary osteoarthritis of left knee, S/P cone biopsy of cervix, Scoliosis, Seizures ([address]  (ventriculoperitoneal) shunt status, and Wears glasses. who presents to the emergency department    82-year-old female with history of seizure-like activity,  shunt, prediabetes, ADHD, depression with anxiety, chronic headaches, migraines, who presents for possible seizure-like activity. Patient states she was at home she was not feeling well she is recently diagnosed with influenza and next thing she knew she was at the hospital she believes one of her family members must of called EMS. Patient states she frequently gets these seizures. Denies postictal behavior. No tongue biting no urinary incontinence. Patient recently admitted to the hospital for this. Patient has a history of  shunt with multiple recent revisions.   Patient states she was seen in the ER yesterday for the same complaint and was discharged. No other new medications at this time Next follow-up with neurology is in December. No other modifying factors. No other associated symptoms. Symptoms are completely resolved currently. The history is provided by the patient. No  was used. NursingNotes were reviewed. REVIEW OF SYSTEMS    (2-9 systems for level 4, 10 or more for level 5)     Review of Systems   Constitutional: Negative. Negative for chills and fever. HENT:  Negative for congestion and sore throat. Eyes: Negative. Negative for photophobia and visual disturbance. Respiratory: Negative. Negative for shortness of breath. Cardiovascular: Negative. Negative for chest pain. Gastrointestinal: Negative. Negative for abdominal pain. Genitourinary: Negative. Musculoskeletal: Negative. Skin: Negative. Neurological:  Positive for seizures. Negative for weakness and headaches. Except as noted above the remainder of the review of systems was reviewed and negative. PAST MEDICAL HISTORY     Past Medical History:   Diagnosis Date    ADHD (attention deficit hyperactivity disorder) 1988    Depression with anxiety     Diabetes mellitus ([facility] [facility]     pre-diabetes    Difficult intubation     airway swelled up    Encounter for imaging to screen for metal prior to MRI 06/01/2021    MRI Conditional [employer] Non-Programmable shunt model#[id number] implanted 10/30/2020 at [facility]. Normal Mode. 1.5T or 3.0T. ESBL (extended spectrum beta-lactamase) producing bacteria infection 11/06/2019    urine    Functional ovarian cysts 2008    rt ovary cyst x 2 yrs.     Headache(784.0)     migraines    History of blood transfusion     at birth    History of kidney stones     History of PCOS     had hysterectomy in 2016    Hydrocephalus [facility])     Hyperlipidemia     Irritable bowel syndrome 2004    had colonoscopy about 6 yrs ago    Meningitis 16/9841    Neutrophilic leukocytosis     Nicotine dependence     PONV (postoperative nausea and vomiting)     very nauseated and sometimes wakes up with a Migraine--happened once after brain surgery    Primary osteoarthritis of left knee 2016    S/P cone biopsy of cervix     Scoliosis .s    Seizures ([address]     twice--last one in 2022     (ventriculoperitoneal) shunt status     hydrocephalus f/w Neurosurgeon at [facility]    Wears glasses     reading         SURGICALHISTORY       Past Surgical History:   Procedure Laterality Date    ABDOMEN SURGERY N/A 2021    REMOVAL OF ABDOMINAL WALL MASS performed by Batool Thornton MD at [facility]      appendicitis     SECTION  2005    placenta previa    CHOLECYSTECTOMY      COLONOSCOPY  2004    COLPOSCOPY      CSF SHUNT      replaced at age 17    CYSTOSCOPY      stone removal    HEMORRHOID SURGERY      HERNIA REPAIR Bilateral     inguinal    HERNIA REPAIR      hiatal hernia    HYSTERECTOMY, TOTAL ABDOMINAL (CERVIX REMOVED)  2016    TAHBSO, adhesions/PCOS    KNEE ARTHROSCOPY Left 2015    medial meniscectomy, chondroplasty, plica resection    LITHOTRIPSY Bilateral 12/10/2019    OTHER SURGICAL HISTORY  10/19/2016    op lap    VENTRAL HERNIA REPAIR N/A 2022    LAPAROSCOPIC LYSIS OF ADHESIONS AND ABDOMINAL WALL MASS REMOVAL performed by Batool Thornton MD at [address]      multiple revisions, most recent          CURRENT MEDICATIONS       Discharge Medication List as of 2022  7:18 PM        CONTINUE these medications which have NOT CHANGED    Details   ondansetron (ZOFRAN ODT) 4 MG disintegrating tablet Take 1 tablet by mouth every 8 hours as needed for Nausea, Disp-20 tablet, R-0Normal      levETIRAcetam (KEPPRA) 500 MG tablet Take 2 tablets by mouth 2 times daily, Disp-60 tablet, R-3Normal      benzonatate (TESSALON PERLES) 100 MG capsule Take 1 capsule by mouth 3 times daily as needed for Cough, Disp-21 capsule, R-0Normal      lisdexamfetamine (VYVANSE) 50 MG capsule Take 1 capsule by mouth every morning for 7 days. , Disp-7 capsule, R-0Normal      oxyCODONE (ROXICODONE) 5 MG immediate release tablet Take 5 mg by mouth every 6 hours as needed for Pain. Historical Med      hydrOXYzine HCl (ATARAX) 25 MG tablet Take 25 mg by mouth 3 times daily as needed for ItchingHistorical Med      albuterol sulfate HFA (VENTOLIN HFA) 108 (90 Base) MCG/ACT inhaler Inhale 2 puffs into the lungs 4 times daily as needed for Wheezing, Disp-54 g, R-1Normal      gabapentin (NEURONTIN) 400 MG capsule Take 400 mg by mouth 3 times daily. Historical Med                  Bee venom, Bentyl [dicyclomine hcl], Dicyclomine, Ketorolac tromethamine, Levofloxacin, Maitake, Shiitake mushroom, Sulfa antibiotics, Vancomycin, Zosyn [piperacillin sod-tazobactam so], Adhesive tape, Oxycodone-acetaminophen, Sulfacetamide, Acetaminophen, Butalbital-apap-caff-cod, Ceftaroline, Daptomycin, Fosfomycin tromethamine, Haldol [haloperidol], Ketamine, Methocarbamol, Morphine, Nitrofurantoin, Prochlorperazine, Reglan [metoclopramide], Silicone, and Tazobactam    FAMILY HISTORY       Family History   Problem Relation Age of Onset    High Blood Pressure Mother     Diabetes Mother     High Cholesterol Mother     Depression Mother     Diabetes Maternal Grandmother     High Blood Pressure Maternal Grandmother     High Cholesterol Maternal Grandmother     Heart Disease Maternal Grandfather     High Blood Pressure Maternal Grandfather     Heart Disease Paternal Grandmother     Stroke Paternal Grandfather     Depression Sister     Cirrhosis Father     Rheum Arthritis Neg Hx     Osteoarthritis Neg Hx     Asthma Neg Hx     Breast Cancer Neg Hx     Cancer Neg Hx     Heart Failure Neg Hx     Hypertension Neg Hx     Migraines Neg Hx     Ovarian Cancer Neg Hx     Rashes/Skin Problems Neg Hx     Seizures Neg Hx     Thyroid Disease Neg Hx           SOCIAL HISTORY       Social History Socioeconomic History    Marital status: Single   Occupational History    Occupation: disability, SSI    Tobacco Use    Smoking status: Every Day     Packs/day: 0.50     Years: 18.00     Pack years: 9.00     Types: Cigarettes    Smokeless tobacco: Never   Vaping Use    Vaping Use: Never used   Substance and Sexual Activity    Alcohol use: No     Alcohol/week: 0.0 standard drinks    Drug use: Never    Sexual activity: Not Currently     Partners: Male       SCREENINGS    [place] Coma Scale  Eye Opening: Spontaneous  Best Verbal Response: Oriented  Best Motor Response: Obeys commands  [place] Coma Scale Score: 15        PHYSICAL EXAM    (up to 7 for level 4, 8 or more for level 5)     ED Triage Vitals [11/08/22 1807]   BP Temp Temp Source Heart Rate Resp SpO2 Height Weight   (!) 143/81 98.3 °F (36.8 °C) Oral 93 15 100 % 4' 11\" (1.499 m) 155 lb (70.3 kg)       Physical Exam  Vitals and nursing note reviewed. Constitutional:       General: She is not in acute distress. Appearance: Normal appearance. She is not ill-appearing, toxic-appearing or diaphoretic. HENT:      Head: Normocephalic and atraumatic. Right Ear: Tympanic membrane and external ear normal.      Left Ear: Tympanic membrane and external ear normal.      Nose: Nose normal.      Mouth/Throat:      Mouth: Mucous membranes are moist.   Eyes:      General: No visual field deficit. Extraocular Movements: Extraocular movements intact. Conjunctiva/sclera: Conjunctivae normal.      Pupils: Pupils are equal, round, and reactive to light. Cardiovascular:      Rate and Rhythm: Normal rate and regular rhythm. Pulses: Normal pulses. Heart sounds: Normal heart sounds. Pulmonary:      Effort: Pulmonary effort is normal.      Breath sounds: Normal breath sounds. Abdominal:      General: Abdomen is flat. Bowel sounds are normal.      Palpations: Abdomen is soft. Tenderness: There is no abdominal tenderness.    Musculoskeletal: General: Normal range of motion. Cervical back: Neck supple. Skin:     General: Skin is warm and dry. Capillary Refill: Capillary refill takes less than 2 seconds. Neurological:      General: No focal deficit present. Mental Status: She is alert and oriented to person, place, and time. GCS: GCS eye subscore is 4. GCS verbal subscore is 5. GCS motor subscore is 6. Cranial Nerves: No cranial nerve deficit, dysarthria or facial asymmetry. Sensory: Sensation is intact. No sensory deficit. Motor: Motor function is intact. No weakness, tremor, atrophy, abnormal muscle tone or pronator drift. Coordination: Coordination is intact. Coordination normal. Finger-Nose-Finger Test and Heel to [facility] [[email or web address]] Test normal.   Psychiatric:         Mood and Affect: Mood normal.         Behavior: Behavior normal.       RESULTS     EKG: All EKG's are interpreted by the Emergency Department Physician who either signs or Co-signs this chart in the absence of a cardiologist.    RADIOLOGY:   Non-plain filmimages such as CT, Ultrasound and MRI are read by the radiologist.  All images reviewed by the emergency department physician who either signs or cosigns this chart. Interpretation per the Radiologist below, if available at the time ofthis note:    No orders to display         ED BEDSIDE ULTRASOUND:   Performed by ED Physician - none    LABS:  Labs Reviewed - No data to display    All other labs were within normal range or not returned as of this dictation. EMERGENCY DEPARTMENT COURSE and DIFFERENTIAL DIAGNOSIS/MDM:   Vitals:    Vitals:    11/08/22 1807   BP: (!) 143/81   Pulse: 93   Resp: 15   Temp: 98.3 °F (36.8 °C)   TempSrc: Oral   SpO2: 100%   Weight: 155 lb (70.3 kg)   Height: 4' 11\" (1.499 m)       Patient was given thefollowing medications:  Medications - No data to display    ED COURSE & MEDICAL DECISION MAKING    Pertinent Labs & Imaging studies reviewed.  (See chart for details)   - Patient seen and evaluated in the emergency department. -  Triage and nursing notes reviewed and incorporated. -  Old chart records reviewed and incorporated. -  Differential diagnosis includes: Differential diagnosis: pseudoseizure, status epilepticus, breakthrough seizure, intracranial bleed, brain tumor, hypoglycemia, neck fracture or other orthopedic fractures, posterior shoulder dislocation, tongue laceration, other. 45-year-old female with history of possible psychosomatic or unclear seizure-like activity who presents for acute on chronic symptoms. Exam is unremarkable she is afebrile not tachycardic saturating well on room air normotensive. She does have a history of  shunt she had multiple recent evaluations including a CT scan yesterday. She was recently admitted to [facility] for evaluation of her seizures where she had a normal EEG. She was not discharged on any seizure medication. She does have a follow-up with neurology scheduled. Neurology's most recent notes were that they were not concerned that these were true seizures. I agree with this at this time patient had no postictal.  No tongue biting no urinary incontinence. Patient is otherwise fully alert and oriented appears back to her baseline. Will discharge with strict turn precautions for any new or worsening symptoms. No signs of trauma. -  Work-up included:  See above  -  ED treatment included: See above  -  Results discussed with patient. The patient is agreeable with plan of care and disposition. Is this patient to be included in the SEP-1 Core Measure due to severe sepsis or septic shock? No   Exclusion criteria - the patient is NOT to be included for SEP-1 Core Measure due to: Infection is not suspected     REASSESSMENT      The patient is at low risk for mortality based on demographic, history and clinical factors.  Given the best available information and clinical assessment, I estimate the risk of hospitalization to be greater than risk of treatment at home. I have explained to the patient that the risk could rapidly change, given precautions for return and instructions. Explained to patient that the risk for mortality is low based on demographic, history and clinical factors. I discussed with patient the results of evaluation in the ED, diagnosis, care, and prognosis. The plan is to discharge to home. Patient is in agreement with plan and questions have been answered. I also discussed with patient the reasons which may require a return visit and the importance of follow-up care. The patient is well-appearing, nontoxic, and improved at the time of discharge. Patient agrees to call to arrange follow-up care as directed. Patient understands to return immediately for worsening/change in symptoms. CRITICAL CARE TIME   Total Critical Care time was 0 minutes, excluding separately reportable procedures. There was a high probability of clinically significant/life threatening deterioration in the patient's condition which required my urgent intervention. This includes multiple reevaluations, vital sign monitoring, pulse oximetry monitoring, telemetry monitoring, clinical response to the IV medications, reviewing the nursing notes, consultation time, dictation/documentation time, and interpretation of the labwork. (This time excludes time spent performing procedures). CONSULTS:  None    PROCEDURES:  Unless otherwise noted below, none     Procedures    FINAL IMPRESSION      1.  Seizure-like activity [facility])          DISPOSITION/PLAN   DISPOSITION Decision To Discharge 11/08/2022 07:17:38 PM      PATIENT REFERREDTO:  FREDRICK Bynum - CNP  [address] [place] [address]  [phone]    Schedule an appointment as soon as possible for a visit       DISCHARGEMEDICATIONS:  Discharge Medication List as of 11/8/2022  7:18 PM             (Please note that portions of this note were completed with a voice recognition program.  Efforts were made to edit the dictations but occasionally words are mis-transcribed.)    Koko Peterson MD (electronically signed)  Attending Emergency Physician         Koko Peterson MD  11/08/22 8829

## 2022-11-09 NOTE — ED PROVIDER NOTES
I PERSONALLY SAW THE PATIENT AND PERFORMED A SUBSTANTIVE PORTION OF THE VISIT INCLUDING ALL ASPECTS OF THE MEDICAL DECISION MAKING PROCESS. [address]      Pt Name: Cindy Arevalo  MRN: [id number]  Sadie 1980  Date of evaluation: 11/9/2022  Provider: Dottie Aguilar MD    CHIEF COMPLAINT       Chief Complaint   Patient presents with    Seizures       HISTORY OF PRESENT Ree Hand is a 38 y.o. female who presents to the emergency department with seizure. Patient presents with second seizure. Was evaluated in the emergency room earlier today. She has a history of these episodes. Unclear etiology as she has had reassuring EEGs and been cleared by neurology of seizures. She was prescribed Keppra by ER physician recently. Never got the prescription filled. Is not currently on Keppra. Denies any complaints at this time. No chest pain or shortness of breath. No abdominal pain. Has had multiple work-ups for this in the past.  No other associated symptoms. Nursing Notes were reviewed. Including nursing noted for FM, Surgical History, Past Medical History, Social History, vitals, and allergies; agree with all. REVIEW OF SYSTEMS       Review of Systems   Constitutional:  Negative for diaphoresis and unexpected weight change. HENT:  Negative for congestion and dental problem. Eyes:  Negative for discharge and itching. Respiratory:  Negative for cough and shortness of breath. Cardiovascular:  Negative for chest pain and leg swelling. Gastrointestinal:  Negative for abdominal pain, constipation and vomiting. Endocrine: Negative for cold intolerance and heat intolerance. Genitourinary:  Negative for vaginal bleeding, vaginal discharge and vaginal pain. Musculoskeletal:  Negative for neck pain and neck stiffness. Skin:  Negative for color change and pallor. Neurological:  Positive for seizures.  Negative for tremors and weakness. Psychiatric/Behavioral:  Negative for agitation and behavioral problems. Except as noted above the remainder of the review of systems was reviewed and negative. PAST MEDICAL HISTORY     Past Medical History:   Diagnosis Date    ADHD (attention deficit hyperactivity disorder)     Depression with anxiety     Diabetes mellitus ([address]     pre-diabetes    Difficult intubation     airway swelled up    Encounter for imaging to screen for metal prior to MRI 2021    MRI Conditional Medtronic Non-Programmable shunt model#[id number] implanted 10/30/2020 at [facility]. Normal Mode. 1.5T or 3.0T. ESBL (extended spectrum beta-lactamase) producing bacteria infection 2019    urine    Functional ovarian cysts 2008    rt ovary cyst x 2 yrs.     Headache(784.0)     migraines    History of blood transfusion     at birth    History of kidney stones     History of PCOS     had hysterectomy in 2016    Hydrocephalus [facility])     Hyperlipidemia     Irritable bowel syndrome 2004    had colonoscopy about 6 yrs ago    Meningitis     Neutrophilic leukocytosis     Nicotine dependence     PONV (postoperative nausea and vomiting)     very nauseated and sometimes wakes up with a Migraine--happened once after brain surgery    Primary osteoarthritis of left knee 2016    S/P cone biopsy of cervix 2004    Scoliosis 1990.s    Seizures ([address]     twice--last one in 2022     (ventriculoperitoneal) shunt status     hydrocephalus f/w Neurosurgeon at [facility]    Wears glasses     reading       SURGICAL HISTORY       Past Surgical History:   Procedure Laterality Date    ABDOMEN SURGERY N/A 2021    REMOVAL OF ABDOMINAL WALL MASS performed by Celine Goetz MD at [facility]      appendicitis     SECTION  2005    placenta previa    CHOLECYSTECTOMY      COLONOSCOPY  2004    COLPOSCOPY      CSF SHUNT      replaced at age 17    CYSTOSCOPY      stone removal    HEMORRHOID SURGERY      HERNIA REPAIR Bilateral 1988    inguinal    HERNIA REPAIR  2015    hiatal hernia    HYSTERECTOMY, TOTAL ABDOMINAL (CERVIX REMOVED)  11/09/2016    TAHBSO, adhesions/PCOS    KNEE ARTHROSCOPY Left 1/7/2015    medial meniscectomy, chondroplasty, plica resection    LITHOTRIPSY Bilateral 12/10/2019    OTHER SURGICAL HISTORY  10/19/2016    op lap    VENTRAL HERNIA REPAIR N/A 9/14/2022    LAPAROSCOPIC LYSIS OF ADHESIONS AND ABDOMINAL WALL MASS REMOVAL performed by Hubert Khanna MD at [place] [address]      multiple revisions, most recent 2015       CURRENT MEDICATIONS       Previous Medications    ALBUTEROL SULFATE HFA (VENTOLIN HFA) 108 (90 BASE) MCG/ACT INHALER    Inhale 2 puffs into the lungs 4 times daily as needed for Wheezing    BENZONATATE (TESSALON PERLES) 100 MG CAPSULE    Take 1 capsule by mouth 3 times daily as needed for Cough    GABAPENTIN (NEURONTIN) 400 MG CAPSULE    Take 400 mg by mouth 3 times daily. HYDROXYZINE HCL (ATARAX) 25 MG TABLET    Take 25 mg by mouth 3 times daily as needed for Itching    LEVETIRACETAM (KEPPRA) 500 MG TABLET    Take 2 tablets by mouth 2 times daily    LISDEXAMFETAMINE (VYVANSE) 50 MG CAPSULE    Take 1 capsule by mouth every morning for 7 days. ONDANSETRON (ZOFRAN ODT) 4 MG DISINTEGRATING TABLET    Take 1 tablet by mouth every 8 hours as needed for Nausea    OXYCODONE (ROXICODONE) 5 MG IMMEDIATE RELEASE TABLET    Take 5 mg by mouth every 6 hours as needed for Pain.        ALLERGIES     Bee venom, Bentyl [dicyclomine hcl], Dicyclomine, Ketorolac tromethamine, Levofloxacin, Maitake, Shiitake mushroom, Sulfa antibiotics, Vancomycin, Zosyn [piperacillin sod-tazobactam so], Adhesive tape, Oxycodone-acetaminophen, Sulfacetamide, Acetaminophen, Butalbital-apap-caff-cod, Ceftaroline, Daptomycin, Fosfomycin tromethamine, Haldol [haloperidol], Ketamine, Methocarbamol, Morphine, Nitrofurantoin, Prochlorperazine, Reglan [metoclopramide], Silicone, and Tazobactam    FAMILY HISTORY        Family History   Problem Relation Age of Onset    High Blood Pressure Mother     Diabetes Mother     High Cholesterol Mother     Depression Mother     Diabetes Maternal Grandmother     High Blood Pressure Maternal Grandmother     High Cholesterol Maternal Grandmother     Heart Disease Maternal Grandfather     High Blood Pressure Maternal Grandfather     Heart Disease Paternal Grandmother     Stroke Paternal Grandfather     Depression Sister     Cirrhosis Father     Rheum Arthritis Neg Hx     Osteoarthritis Neg Hx     Asthma Neg Hx     Breast Cancer Neg Hx     Cancer Neg Hx     Heart Failure Neg Hx     Hypertension Neg Hx     Migraines Neg Hx     Ovarian Cancer Neg Hx     Rashes/Skin Problems Neg Hx     Seizures Neg Hx     Thyroid Disease Neg Hx        SOCIAL HISTORY       Social History     Socioeconomic History    Marital status: Single   Occupational History    Occupation: disability, SSI    Tobacco Use    Smoking status: Every Day     Packs/day: 0.50     Years: 18.00     Pack years: 9.00     Types: Cigarettes    Smokeless tobacco: Never   Vaping Use    Vaping Use: Never used   Substance and Sexual Activity    Alcohol use: No     Alcohol/week: 0.0 standard drinks    Drug use: Never    Sexual activity: Not Currently     Partners: Male       PHYSICAL EXAM       ED Triage Vitals   BP Temp Temp src Heart Rate Resp SpO2 Height Weight   11/09/22 0038 11/09/22 0041 -- 11/09/22 0041 11/09/22 0041 11/09/22 0038 11/09/22 0041 11/09/22 0041   (!) 146/91 98.8 °F (37.1 °C)  88 11 100 % 4' 11\" (1.499 m) 169 lb 15.6 oz (77.1 kg)       Physical Exam  Vitals and nursing note reviewed. Constitutional:       General: She is not in acute distress. Appearance: She is well-developed. She is not ill-appearing, toxic-appearing or diaphoretic. HENT:      Head: Normocephalic and atraumatic.       Right Ear: External ear normal.      Left Ear: External ear normal. Eyes:      General:         Right eye: No discharge. Left eye: No discharge. Conjunctiva/sclera: Conjunctivae normal.      Pupils: Pupils are equal, round, and reactive to light. Cardiovascular:      Rate and Rhythm: Normal rate and regular rhythm. Heart sounds: No murmur heard. Pulmonary:      Effort: Pulmonary effort is normal. No respiratory distress. Breath sounds: Normal breath sounds. No wheezing or rales. Abdominal:      General: Bowel sounds are normal. There is no distension. Palpations: Abdomen is soft. There is no mass. Tenderness: There is no abdominal tenderness. There is no guarding or rebound. Genitourinary:     Comments: Deferred  Musculoskeletal:         General: No deformity. Normal range of motion. Cervical back: Normal range of motion and neck supple. Skin:     General: Skin is warm. Findings: No erythema or rash. Neurological:      Mental Status: She is alert and oriented to person, place, and time. She is not disoriented. Cranial Nerves: No cranial nerve deficit. Motor: No atrophy or abnormal muscle tone. Coordination: Coordination normal.   Psychiatric:         Behavior: Behavior normal.         Thought Content: Thought content normal.       DIAGNOSTIC RESULTS     ED BEDSIDE ULTRASOUND:   Performed by ED Physician - none    LABS:  Labs Reviewed - No data to display    All other labs were withinnormal range or not returned as of this dictation. EMERGENCY DEPARTMENT COURSE and DIFFERENTIAL DIAGNOSIS/MDM:     PMH, Surgical Hx, FH, Social Hx reviewed by myself (ETOH usage, Tobacco usage, Drug usage reviewed by myself, no pertinent Hx)- No Pertinent Hx     Old records were reviewed by me     MDM 61-year-old presented with chief complaint of seizure-like activity. Has been to the emergency room multiple times been evaluated by ER physicians as well as neurologist and have had reassuring work-ups multiple times.   No evidence of postictal phase today. She is alert oriented answers all questions appropriately. Requesting pain medicine on arrival.  She was prescribed Keppra by a prior ER physician. She never got it picked up. We will give her a dose today. Low suspicion of seizure-like activity. EEG reassuring. Has had MRIs in the past.  She feels safe going home. Cannot drive until cleared by neurology. Outpatient follow-up. Disposition  I estimate there is LOW risk for Sepsis, MI, Stroke, Tamponade, PTX, Toxicity or other life threatening etiology thus I consider the discharge disposition reasonable. The patient is at low risk for mortality based on demographic, history and clinical factors. Given the best available information and clinical assessment, I estimate the risk of hospitalization to be greater than risk of treatment at home. I have explained to the patient that the risk could rapidly change, given precautions for return and instructions. Explained to patient that the risk for mortality is low based on demographic, history and clinical factors. I discussed with patient the results of evaluation in the ED, diagnosis, care, and prognosis. The plan is to discharge to home. Patient is in agreement with plan and questions have been answered. I also discussed with patient the reasons which may require a return visit and the importance of follow-up care. The patient is well-appearing, nontoxic, and improved at the time of discharge. Patient agrees to call to arrange follow-up care as directed. Patient understands to return immediately for worsening/change in symptoms. I PERSONALLY SAW THE PATIENT AND PERFORMED A SUBSTANTIVE PORTION OF THE VISIT INCLUDING ALL ASPECTS OF THE MEDICAL DECISION MAKING PROCESS. The primary clinician of record [employer]   Total Critical Caretime was 21 minutes, excluding separately reportable procedures.   There was a high probability of clinically significant/life threatening deterioration in the patient's condition which required my urgent intervention. CRITICAL CARE  I personally saw the patient and independently provided 21 minutes of non-concurrent critical care out of the total shared critical care time provided. This excludes seperately billable procedures. Critical care time was provided for patient as above that required close evaluation and/or intervention with concern for potential patient decompensation. PROCEDURES:  Unlessotherwise noted below, none    FINAL IMPRESSION      1.  Encounter for medical assessment          DISPOSITION/PLAN   DISPOSITION      PATIENT REFERRED TO:  *[facility]  [facility] [phone]  [phone]    Call today      DISCHARGE MEDICATIONS:  New Prescriptions    No medications on file          (Please note that portions ofthis note were completed with a voice recognition program.  Efforts were made to edit the dictations but occasionally words are mis-transcribed.)    Kofi Christian MD(electronically signed)  Attending Emergency Physician            Kofi Christian MD  11/09/22 0322

## 2022-11-09 NOTE — ED PROVIDER NOTES
[address]  [address] [address]  Dept: [phone]  Loc: [phone]  eMERGENCYdEPARTMENT eNCOUnter      Pt Name: Codi Shankar  MRN: [id number]  [place] 1980  Date of evaluation: 11/9/2022  Provider:Symone Chowdhury PA-C    CHIEF COMPLAINT       Chief Complaint   Patient presents with    Seizures       CRITICAL CARE TIME   Total Critical Care time was 0 minutes, excluding separately reportable procedures. There was a high probability of clinically significant/life threatening deterioration in the patient's condition which required my urgentintervention. HISTORY OF PRESENT ILLNESS  (Location/Symptom, Timing/Onset, Context/Setting, Quality, Duration,Modifying Factors, Severity.)   Codi Shankar is a 38 y.o. female who presents to the emergency department by EMS for seizure-like activity. Patient had witnessed seizure-like activity by her daughter. EMS was called. She is brought to ED for further evaluation. This is her second seizure today. She had a seizure yesterday and was placed on Keppra. She has not filled this medication. She has had inpatient work-up evaluation regarding seizures/convulsions. There was no clear etiology and a reassuring EEG. Patient has been recently diagnosed with influenza B. States those symptoms are minimal at this time. Denies any fevers. Nursing Notes were reviewedand agreed with or any disagreements were addressed in the HPI. REVIEW OF SYSTEMS    (2-9 systems for level 4, 10 or more for level 5)     Review of Systems   Constitutional:  Negative for chills and fever. HENT: Negative. Respiratory:  Negative for chest tightness and shortness of breath. Cardiovascular: Negative. Gastrointestinal:  Negative for abdominal pain and nausea. Genitourinary: Negative. Musculoskeletal:  Negative for arthralgias and myalgias. Skin: Negative.     Neurological: Positive for seizures and headaches. Psychiatric/Behavioral:  Negative for behavioral problems and confusion. Except as noted above the remainder of the review of systems was reviewed and negative. PAST MEDICAL HISTORY         Diagnosis Date    ADHD (attention deficit hyperactivity disorder) 1988    Depression with anxiety     Diabetes mellitus ([address]     pre-diabetes    Difficult intubation     airway swelled up    Encounter for imaging to screen for metal prior to MRI 2021    MRI Conditional Medtronic Non-Programmable shunt model#61860 implanted 10/30/2020 at [facility]. Normal Mode. 1.5T or 3.0T. ESBL (extended spectrum beta-lactamase) producing bacteria infection 2019    urine    Functional ovarian cysts 2008    rt ovary cyst x 2 yrs.     Headache(784.0)     migraines    History of blood transfusion     at birth    History of kidney stones     History of PCOS     had hysterectomy in 2016    Hydrocephalus [facility])     Hyperlipidemia     Irritable bowel syndrome 2004    had colonoscopy about 6 yrs ago    Meningitis     Neutrophilic leukocytosis     Nicotine dependence     PONV (postoperative nausea and vomiting)     very nauseated and sometimes wakes up with a Migraine--happened once after brain surgery    Primary osteoarthritis of left knee 2016    S/P cone biopsy of cervix 2004    Scoliosis .s    Seizures ([address]     twice--last one in 2022     (ventriculoperitoneal) shunt status     hydrocephalus f/w Neurosurgeon at [facility]    Wears glasses     reading       SURGICAL HISTORY           Procedure Laterality Date    ABDOMEN SURGERY N/A 2021    REMOVAL OF ABDOMINAL WALL MASS performed by Hubert Khanna MD at [facility]  2003    appendicitis     SECTION  2005    placenta previa    CHOLECYSTECTOMY      COLONOSCOPY  2004    COLPOSCOPY  2004    CSF SHUNT      replaced at age 17    CYSTOSCOPY      stone removal    HEMORRHOID SURGERY HERNIA REPAIR Bilateral 1988    inguinal    HERNIA REPAIR  2015    hiatal hernia    HYSTERECTOMY, TOTAL ABDOMINAL (CERVIX REMOVED)  11/09/2016    TAHBSO, adhesions/PCOS    KNEE ARTHROSCOPY Left 1/7/2015    medial meniscectomy, chondroplasty, plica resection    LITHOTRIPSY Bilateral 12/10/2019    OTHER SURGICAL HISTORY  10/19/2016    op lap    VENTRAL HERNIA REPAIR N/A 9/14/2022    LAPAROSCOPIC LYSIS OF ADHESIONS AND ABDOMINAL WALL MASS REMOVAL performed by Amie Holland MD at [place] [address]      multiple revisions, most recent 2015       CURRENT MEDICATIONS     [unfilled]    ALLERGIES     Bee venom, Bentyl [dicyclomine hcl], Dicyclomine, Ketorolac tromethamine, Levofloxacin, Maitake, Shiitake mushroom, Sulfa antibiotics, Vancomycin, Zosyn [piperacillin sod-tazobactam so], Adhesive tape, Oxycodone-acetaminophen, Sulfacetamide, Acetaminophen, Butalbital-apap-caff-cod, Ceftaroline, Daptomycin, Fosfomycin tromethamine, Haldol [haloperidol], Ketamine, Methocarbamol, Morphine, Nitrofurantoin, Prochlorperazine, Reglan [metoclopramide], Silicone, and Tazobactam    FAMILY HISTORY           Problem Relation Age of Onset    High Blood Pressure Mother     Diabetes Mother     High Cholesterol Mother     Depression Mother     Diabetes Maternal Grandmother     High Blood Pressure Maternal Grandmother     High Cholesterol Maternal Grandmother     Heart Disease Maternal Grandfather     High Blood Pressure Maternal Grandfather     Heart Disease Paternal Grandmother     Stroke Paternal Grandfather     Depression Sister     Cirrhosis Father     Rheum Arthritis Neg Hx     Osteoarthritis Neg Hx     Asthma Neg Hx     Breast Cancer Neg Hx     Cancer Neg Hx     Heart Failure Neg Hx     Hypertension Neg Hx     Migraines Neg Hx     Ovarian Cancer Neg Hx     Rashes/Skin Problems Neg Hx     Seizures Neg Hx     Thyroid Disease Neg Hx      Family Status   Relation Name Status    Mother  Alive    MGM  Alive copd, goiter    MGF      PGM  Alive        alcoholic    PGF  Alive    Sister  Alive        scoliosis    Father   at age 50        cirhosis liver    Sister  Alive        scoliosis    Brother  Alive        adhd, mild autism    Neg Hx  (Not Specified)        SOCIAL HISTORY      reports that she has been smoking cigarettes. She has a 9.00 pack-year smoking history. She has never used smokeless tobacco. She reports that she does not drink alcohol and does not use drugs. PHYSICAL EXAM    (up to 7 for level 4, 8 or more for level 5)     ED Triage Vitals   Enc Vitals Group       (!) 146/91      Heart Rate 22 88      Resp 22 11      Temp 22 98.8 °F (37.1 °C)      Temp src --       SpO2 22 100 %      Weight 22 169 lb 15.6 oz (77.1 kg)      Height 22 4' 11\" (1.499 m)      Head Circumference --       Peak Flow --       Pain Score --       Pain Loc --       Pain Edu? --       Excl. in [address]? --         Physical Exam  Vitals reviewed. Constitutional:       Appearance: Normal appearance. HENT:      Head: Normocephalic and atraumatic. Mouth/Throat:      Comments: No tongue injury identified  Pulmonary:      Effort: Pulmonary effort is normal. No respiratory distress. Abdominal:      Palpations: Abdomen is soft. Tenderness: There is no abdominal tenderness. Musculoskeletal:         General: Normal range of motion. Cervical back: Normal range of motion and neck supple. Skin:     General: Skin is warm. Neurological:      General: No focal deficit present. Mental Status: She is alert and oriented to person, place, and time.    Psychiatric:         Mood and Affect: Mood normal.         Behavior: Behavior normal.         DIAGNOSTIC RESULTS     EKG: All EKG's are interpreted by the Emergency Department Physician who either signs or Co-signs this chart in the absence of a cardiologist.    RADIOLOGY: Non-plain film images such as CT, Ultrasound and MRI are read by the radiologist. Plain radiographic images are visualized and preliminarilyinterpreted by the emergency physician with the below findings:    Interpretation per the Radiologist below,if available at the time of this note:    No orders to display         LABS:  Labs Reviewed - No data to display    All other labs were within normal range or not returned as of this dictation. EMERGENCY DEPARTMENT COURSE and DIFFERENTIAL DIAGNOSIS/MDM:   Vitals:    Vitals:    11/09/22 0108 11/09/22 0118 11/09/22 0128 11/09/22 0138   BP:  106/74 114/78    Pulse: 76 76 81 87   Resp: 17 20 18 15   Temp:       SpO2: 100% 100% 96% 97%   Weight:       Height:           MDM    Patient presents ED with HPI noted above. Patient has been seen and evaluated by neurology recently for the seizure/convulsions. No seizure-like activity in the ED. She does not appear postictal at this time. Given recent inpatient work-up including unremarkable MRI and a normal EEG. He was seen yesterday and placed on Keppra. She those medication. She is given dose of Keppra in the ED as well as Fioricet for headache. Patient feels safe going home. Patient currently followed closely with neurology as an outpatient. Seizure precautions discussed pending clearance by neurology. She was discharged home in stable condition. The patient tolerated their visit well. They were seen and evaluated by the attending physician, Dr. Vero Powell who agreed with the assessment and plan. The patient and / or the family were informed of the results of any tests, a time was given to answer questions, a plan was proposed and they agreed with plan. CONSULTS:  None    PROCEDURES:  Procedures    FINAL IMPRESSION      1.  Encounter for medical assessment          DISPOSITION/PLAN   [unfilled]    PATIENT REFERRED TO:  *[facility]  [address]  [phone]    Call today        DISCHARGE MEDICATIONS:  Discharge Medication List as of 11/9/2022  1:42 AM          (Please note that portions of this note were completed with a voice recognition program.  Efforts were made to edit the dictations but occasionally words are mis-transcribed.)    [address] [place], [place]  11/09/22 1201

## 2022-11-11 LAB — BLOOD CULTURE, ROUTINE: NORMAL

## 2022-11-26 ENCOUNTER — APPOINTMENT (OUTPATIENT)
Dept: CT IMAGING | Age: 42
End: 2022-11-26
Payer: COMMERCIAL

## 2022-11-26 ENCOUNTER — HOSPITAL ENCOUNTER (EMERGENCY)
Age: 42
Discharge: HOME | End: 2022-11-27
Attending: EMERGENCY MEDICINE
Payer: COMMERCIAL

## 2022-11-26 DIAGNOSIS — Z53.29 LEFT AGAINST MEDICAL ADVICE: ICD-10-CM

## 2022-11-26 DIAGNOSIS — R56.9 SEIZURE-LIKE ACTIVITY (HCC): Primary | ICD-10-CM

## 2022-11-26 LAB
ANION GAP SERPL CALCULATED.3IONS-SCNC: 15 MMOL/L (ref 3–16)
BASOPHILS ABSOLUTE: 0.1 K/UL (ref 0–0.2)
BASOPHILS RELATIVE PERCENT: 0.9 %
BUN BLDV-MCNC: 11 MG/DL (ref 7–20)
CALCIUM SERPL-MCNC: 9.9 MG/DL (ref 8.3–10.6)
CHLORIDE BLD-SCNC: 101 MMOL/L (ref 99–110)
CO2: 24 MMOL/L (ref 21–32)
CREAT SERPL-MCNC: 0.6 MG/DL (ref 0.6–1.1)
EOSINOPHILS ABSOLUTE: 0.2 K/UL (ref 0–0.6)
EOSINOPHILS RELATIVE PERCENT: 1.5 %
GFR SERPL CREATININE-BSD FRML MDRD: >60 ML/MIN/{1.73_M2}
GLUCOSE BLD-MCNC: 122 MG/DL (ref 70–99)
HCG QUALITATIVE: NEGATIVE
HCT VFR BLD CALC: 44.8 % (ref 36–48)
HEMOGLOBIN: 14.8 G/DL (ref 12–16)
LYMPHOCYTES ABSOLUTE: 3.2 K/UL (ref 1–5.1)
LYMPHOCYTES RELATIVE PERCENT: 26.6 %
MAGNESIUM: 2.2 MG/DL (ref 1.8–2.4)
MCH RBC QN AUTO: 27.9 PG (ref 26–34)
MCHC RBC AUTO-ENTMCNC: 33 G/DL (ref 31–36)
MCV RBC AUTO: 84.5 FL (ref 80–100)
MONOCYTES ABSOLUTE: 0.7 K/UL (ref 0–1.3)
MONOCYTES RELATIVE PERCENT: 5.7 %
NEUTROPHILS ABSOLUTE: 7.8 K/UL (ref 1.7–7.7)
NEUTROPHILS RELATIVE PERCENT: 65.3 %
PDW BLD-RTO: 15.3 % (ref 12.4–15.4)
PLATELET # BLD: 292 K/UL (ref 135–450)
PMV BLD AUTO: 8.2 FL (ref 5–10.5)
POTASSIUM REFLEX MAGNESIUM: 3.2 MMOL/L (ref 3.5–5.1)
RBC # BLD: 5.3 M/UL (ref 4–5.2)
SODIUM BLD-SCNC: 140 MMOL/L (ref 136–145)
WBC # BLD: 11.9 K/UL (ref 4–11)

## 2022-11-26 PROCEDURE — 84703 CHORIONIC GONADOTROPIN ASSAY: CPT

## 2022-11-26 PROCEDURE — 6370000000 HC RX 637 (ALT 250 FOR IP): Performed by: EMERGENCY MEDICINE

## 2022-11-26 PROCEDURE — 80048 BASIC METABOLIC PNL TOTAL CA: CPT

## 2022-11-26 PROCEDURE — 99284 EMERGENCY DEPT VISIT MOD MDM: CPT

## 2022-11-26 PROCEDURE — 70450 CT HEAD/BRAIN W/O DYE: CPT

## 2022-11-26 PROCEDURE — 85025 COMPLETE CBC W/AUTO DIFF WBC: CPT

## 2022-11-26 PROCEDURE — 83735 ASSAY OF MAGNESIUM: CPT

## 2022-11-26 RX ORDER — LEVETIRACETAM 500 MG/1
1000 TABLET ORAL ONCE
Status: DISCONTINUED | OUTPATIENT
Start: 2022-11-26 | End: 2022-11-27 | Stop reason: HOSPADM

## 2022-11-26 RX ORDER — ONDANSETRON 4 MG/1
4 TABLET, ORALLY DISINTEGRATING ORAL ONCE
Status: COMPLETED | OUTPATIENT
Start: 2022-11-26 | End: 2022-11-26

## 2022-11-26 RX ORDER — LEVETIRACETAM 10 MG/ML
1000 INJECTION INTRAVASCULAR ONCE
Status: DISCONTINUED | OUTPATIENT
Start: 2022-11-26 | End: 2022-11-26

## 2022-11-26 RX ORDER — POTASSIUM CHLORIDE 750 MG/1
40 TABLET, FILM COATED, EXTENDED RELEASE ORAL ONCE
Status: DISCONTINUED | OUTPATIENT
Start: 2022-11-26 | End: 2022-11-27 | Stop reason: HOSPADM

## 2022-11-26 RX ORDER — DIAZEPAM 2 MG/1
2 TABLET ORAL
COMMUNITY
Start: 2022-11-17

## 2022-11-26 RX ADMIN — ONDANSETRON 4 MG: 4 TABLET, ORALLY DISINTEGRATING ORAL at 22:50

## 2022-11-26 ASSESSMENT — PAIN DESCRIPTION - DESCRIPTORS: DESCRIPTORS: ACHING

## 2022-11-26 ASSESSMENT — ENCOUNTER SYMPTOMS
EYE ITCHING: 0
ABDOMINAL PAIN: 0
EYE DISCHARGE: 0
COLOR CHANGE: 0
CONSTIPATION: 0
COUGH: 0
VOMITING: 0
SHORTNESS OF BREATH: 0

## 2022-11-26 ASSESSMENT — PAIN - FUNCTIONAL ASSESSMENT: PAIN_FUNCTIONAL_ASSESSMENT: 0-10

## 2022-11-26 ASSESSMENT — PAIN DESCRIPTION - LOCATION: LOCATION: HEAD

## 2022-11-26 ASSESSMENT — PAIN SCALES - GENERAL: PAINLEVEL_OUTOF10: 9

## 2022-11-26 ASSESSMENT — PAIN DESCRIPTION - PAIN TYPE: TYPE: ACUTE PAIN

## 2022-11-26 NOTE — LETTER
[facility] Emergency Department  [address] [address]  Phone: [phone]    Patient: Debbie Moeller  YOB: 1980  Date: 11/27/2022 Time: 1:42 AM    Leaving the [address] [place] [address] Advice    Chart #:[id number]    This will certify that I, the undersigned,    ______________________________________________________________________    A patient in the above named medical center, having requested discharge and removal from the medical center against the advice of my attending physician(s), hereby release the Emergency Department, its physicians, officers and employees, severally and individually, from any and all liability of any nature whatsoever for any injury or harm or complication of any kind that may result directly or indirectly, by reason of my terminating my stay as a patient from [facility], and hereby waive any and all rights of action I may now have or later acquire as a result of my voluntary departure from [facility] and the termination of my stay as a patient therein. This release is made with the full knowledge of the danger that may result from the action which I am taking.       Date:_______________________                         ___________________________                                                                                    Patient/Legal Representative    Witness:        ____________________________                          ___________________________  Nurse                                                                        Physician

## 2022-11-27 ENCOUNTER — APPOINTMENT (OUTPATIENT)
Dept: GENERAL RADIOLOGY | Age: 42
End: 2022-11-27
Payer: COMMERCIAL

## 2022-11-27 VITALS
DIASTOLIC BLOOD PRESSURE: 76 MMHG | OXYGEN SATURATION: 98 % | TEMPERATURE: 98.3 F | HEART RATE: 81 BPM | RESPIRATION RATE: 14 BRPM | SYSTOLIC BLOOD PRESSURE: 138 MMHG

## 2022-11-27 VITALS
TEMPERATURE: 98.5 F | SYSTOLIC BLOOD PRESSURE: 108 MMHG | HEART RATE: 80 BPM | BODY MASS INDEX: 33.87 KG/M2 | WEIGHT: 168 LBS | DIASTOLIC BLOOD PRESSURE: 78 MMHG | OXYGEN SATURATION: 97 % | HEIGHT: 59 IN | RESPIRATION RATE: 20 BRPM

## 2022-11-27 DIAGNOSIS — Z87.898 HISTORY OF PSEUDOSEIZURE: ICD-10-CM

## 2022-11-27 DIAGNOSIS — G40.909 SEIZURE DISORDER (HCC): Primary | ICD-10-CM

## 2022-11-27 PROCEDURE — 73660 X-RAY EXAM OF TOE(S): CPT

## 2022-11-27 PROCEDURE — 6370000000 HC RX 637 (ALT 250 FOR IP): Performed by: EMERGENCY MEDICINE

## 2022-11-27 PROCEDURE — 99283 EMERGENCY DEPT VISIT LOW MDM: CPT

## 2022-11-27 RX ORDER — ONDANSETRON 4 MG/1
4 TABLET, ORALLY DISINTEGRATING ORAL ONCE
Status: CANCELLED | OUTPATIENT
Start: 2022-11-27 | End: 2022-11-27

## 2022-11-27 RX ORDER — GABAPENTIN 300 MG/1
300 CAPSULE ORAL ONCE
Status: COMPLETED | OUTPATIENT
Start: 2022-11-27 | End: 2022-11-27

## 2022-11-27 RX ORDER — POTASSIUM CHLORIDE 20 MEQ/1
40 TABLET, EXTENDED RELEASE ORAL ONCE
Status: COMPLETED | OUTPATIENT
Start: 2022-11-27 | End: 2022-11-27

## 2022-11-27 RX ORDER — LEVETIRACETAM 500 MG/1
1000 TABLET ORAL 2 TIMES DAILY
Qty: 60 TABLET | Refills: 3 | Status: SHIPPED
Start: 2022-11-27

## 2022-11-27 RX ORDER — DIAZEPAM 2 MG/1
2 TABLET ORAL ONCE
Status: COMPLETED | OUTPATIENT
Start: 2022-11-27 | End: 2022-11-27

## 2022-11-27 RX ORDER — PROMETHAZINE HYDROCHLORIDE 25 MG/1
25 TABLET ORAL ONCE
Status: COMPLETED | OUTPATIENT
Start: 2022-11-27 | End: 2022-11-27

## 2022-11-27 RX ADMIN — GABAPENTIN 300 MG: 300 CAPSULE ORAL at 10:02

## 2022-11-27 RX ADMIN — DIAZEPAM 2 MG: 2 TABLET ORAL at 00:38

## 2022-11-27 RX ADMIN — POTASSIUM CHLORIDE 40 MEQ: 1500 TABLET, EXTENDED RELEASE ORAL at 09:18

## 2022-11-27 RX ADMIN — PROMETHAZINE HYDROCHLORIDE 25 MG: 25 TABLET ORAL at 08:38

## 2022-11-27 ASSESSMENT — ENCOUNTER SYMPTOMS
ABDOMINAL PAIN: 0
VOMITING: 0
COUGH: 0
SHORTNESS OF BREATH: 0
NAUSEA: 0
DIARRHEA: 0

## 2022-11-27 NOTE — ED NOTES
Educated pt on discharge paperwork as well as follow-up appointment. Pt verbalizes understanding of all instructions and denies questions. Pt left in wheelchair to wait for lyft by self with all personal belongings, and discharge paperwork to private residence. Pt in no distress at this time.        Onelia Rooney RN  11/27/22 1053

## 2022-11-27 NOTE — DISCHARGE INSTRUCTIONS
Set up an appointment with Dr. Kelly Dowd with neurology to review recent MRI and further discuss any need for seizure medications. Recommend not taking Keppra since it causes an allergic reaction and since the neurologist here recommended that you not be on seizure medications at this time because of a normal EEG.

## 2022-11-27 NOTE — ED NOTES
Patient stated she does not feel her legs and can't walk. Patient moving legs bilaterally and responding to pain. MD [place] came to re evaluate patient . Patient refused to have MD further evaluate her. MD assessed response to pain. Patient screamed she wants to leave. Patient refused AVS, patient refused to sign AMA, and patient refused further vitals. Patient is alert and orientated x4. Patient verbalized understanding of the risk of leaving without further evaluation including death. Patient stood to get in a wheelchair and patient was escorted by ED Tech to lobby. Patient walked to her Lyft.       Remy Montero RN  11/27/22 6183

## 2022-11-27 NOTE — ED NOTES
This nurse attempted PIV x1 unsuccessful. Gala Grow in room for PIV attempt x2 unsuccessful. Cody Salas RN in room for PIV attempt unsuccessful. Patient tolerating well.       Karyle Flora, RN  11/26/22 5787

## 2022-11-27 NOTE — ED PROVIDER NOTES
I PERSONALLY SAW THE PATIENT AND PERFORMED A SUBSTANTIVE PORTION OF THE VISIT INCLUDING ALL ASPECTS OF THE MEDICAL DECISION MAKING PROCESS. [facility]      Pt Name: Amalia German  MRN: [id number]  [place] 1980  Date of evaluation: 11/26/2022  Provider: Pauline Dawson MD    CHIEF COMPLAINT       Chief Complaint   Patient presents with    Seizures     Per EMS patient is here for \"seizure like activity\" patient is here often. Per EMS \"patient is allergic to current seizure medication. \":        HISTORY OF PRESENT ILLNESS    Amalia German is a 38 y.o. female who presents to the emergency department with seizure-like activity. Patient states she had a seizure today. States she does not take her seizure medicine because she does not like it. History of seizure versus pseudoseizure. Has had normal EEGs in the past.  Frequent flyer to the emergency room. Endorses chronic pain currently. Also endorses chronic nausea. No chest pain or shortness of breath. No abdominal pain. No fevers or chills. No other associated symptoms. Nursing Notes were reviewed. Including nursing noted for FM, Surgical History, Past Medical History, Social History, vitals, and allergies; agree with all. REVIEW OF SYSTEMS       Review of Systems   Constitutional:  Negative for diaphoresis and unexpected weight change. HENT:  Negative for congestion and dental problem. Eyes:  Negative for discharge and itching. Respiratory:  Negative for cough and shortness of breath. Cardiovascular:  Negative for chest pain and leg swelling. Gastrointestinal:  Negative for abdominal pain, constipation and vomiting. Endocrine: Negative for cold intolerance and heat intolerance. Genitourinary:  Negative for vaginal bleeding, vaginal discharge and vaginal pain. Musculoskeletal:  Negative for neck pain and neck stiffness.    Skin:  Negative for color change and pallor. Neurological:  Positive for seizures. Negative for tremors and weakness. Psychiatric/Behavioral:  Negative for agitation and behavioral problems. Except as noted above the remainder of the review of systems was reviewed and negative. PAST MEDICAL HISTORY     Past Medical History:   Diagnosis Date    ADHD (attention deficit hyperactivity disorder)     Depression with anxiety     Diabetes mellitus ([address]     pre-diabetes    Difficult intubation     airway swelled up    Encounter for imaging to screen for metal prior to MRI 2021    MRI Conditional [employer] Non-Programmable shunt model#01921 implanted 10/30/2020 at [facility]. Normal Mode. 1.5T or 3.0T. ESBL (extended spectrum beta-lactamase) producing bacteria infection 2019    urine    Functional ovarian cysts 2008    rt ovary cyst x 2 yrs.     Headache(784.0)     migraines    History of blood transfusion     at birth    History of kidney stones     History of PCOS     had hysterectomy in 2016    Hydrocephalus [facility])     Hyperlipidemia     Irritable bowel syndrome 2004    had colonoscopy about 6 yrs ago    Meningitis     Neutrophilic leukocytosis     Nicotine dependence     PONV (postoperative nausea and vomiting)     very nauseated and sometimes wakes up with a Migraine--happened once after brain surgery    Primary osteoarthritis of left knee 2016    S/P cone biopsy of cervix 2004    Scoliosis .s    Seizures ([address]     twice--last one in 2022     (ventriculoperitoneal) shunt status     hydrocephalus f/w Neurosurgeon at [facility]    Wears glasses     reading       SURGICAL HISTORY       Past Surgical History:   Procedure Laterality Date    ABDOMEN SURGERY N/A 2021    REMOVAL OF ABDOMINAL WALL MASS performed by Codi Taylor MD at [facility]  2003    appendicitis     SECTION  2005    placenta previa    CHOLECYSTECTOMY      COLONOSCOPY  2004    COLPOSCOPY  2004    CSF SHUNT      replaced at age 17    CYSTOSCOPY      stone removal    HEMORRHOID SURGERY      HERNIA REPAIR Bilateral 1988    inguinal    HERNIA REPAIR  2015    hiatal hernia    HYSTERECTOMY, TOTAL ABDOMINAL (CERVIX REMOVED)  11/09/2016    TAHBSO, adhesions/PCOS    KNEE ARTHROSCOPY Left 1/7/2015    medial meniscectomy, chondroplasty, plica resection    LITHOTRIPSY Bilateral 12/10/2019    OTHER SURGICAL HISTORY  10/19/2016    op lap    VENTRAL HERNIA REPAIR N/A 9/14/2022    LAPAROSCOPIC LYSIS OF ADHESIONS AND ABDOMINAL WALL MASS REMOVAL performed by Jaime Marino MD at [address]      multiple revisions, most recent 2015       CURRENT MEDICATIONS       Previous Medications    ALBUTEROL SULFATE HFA (VENTOLIN HFA) 108 (90 BASE) MCG/ACT INHALER    Inhale 2 puffs into the lungs 4 times daily as needed for Wheezing    GABAPENTIN (NEURONTIN) 400 MG CAPSULE    Take 400 mg by mouth 3 times daily. HYDROXYZINE HCL (ATARAX) 25 MG TABLET    Take 25 mg by mouth 3 times daily as needed for Itching    LEVETIRACETAM (KEPPRA) 500 MG TABLET    Take 2 tablets by mouth 2 times daily    LISDEXAMFETAMINE (VYVANSE) 50 MG CAPSULE    Take 1 capsule by mouth every morning for 7 days. ONDANSETRON (ZOFRAN ODT) 4 MG DISINTEGRATING TABLET    Take 1 tablet by mouth every 8 hours as needed for Nausea    OXYCODONE (ROXICODONE) 5 MG IMMEDIATE RELEASE TABLET    Take 5 mg by mouth every 6 hours as needed for Pain.        ALLERGIES     Bee venom, Bentyl [dicyclomine hcl], Dicyclomine, Ketorolac tromethamine, Levofloxacin, Maitake, Shiitake mushroom, Sulfa antibiotics, Vancomycin, Zosyn [piperacillin sod-tazobactam so], Adhesive tape, Oxycodone-acetaminophen, Sulfacetamide, Acetaminophen, Butalbital-apap-caff-cod, Ceftaroline, Daptomycin, Fosfomycin tromethamine, Haldol [haloperidol], Keppra [levetiracetam], Ketamine, Methocarbamol, Morphine, Nitrofurantoin, Prochlorperazine, Reglan [metoclopramide], Silicone, and Tazobactam    FAMILY HISTORY        Family History   Problem Relation Age of Onset    High Blood Pressure Mother     Diabetes Mother     High Cholesterol Mother     Depression Mother     Diabetes Maternal Grandmother     High Blood Pressure Maternal Grandmother     High Cholesterol Maternal Grandmother     Heart Disease Maternal Grandfather     High Blood Pressure Maternal Grandfather     Heart Disease Paternal Grandmother     Stroke Paternal Grandfather     Depression Sister     Cirrhosis Father     Rheum Arthritis Neg Hx     Osteoarthritis Neg Hx     Asthma Neg Hx     Breast Cancer Neg Hx     Cancer Neg Hx     Heart Failure Neg Hx     Hypertension Neg Hx     Migraines Neg Hx     Ovarian Cancer Neg Hx     Rashes/Skin Problems Neg Hx     Seizures Neg Hx     Thyroid Disease Neg Hx        SOCIAL HISTORY       Social History     Socioeconomic History    Marital status: Single     Spouse name: None    Number of children: None    Years of education: None    Highest education level: None   Occupational History    Occupation: disability, SSI    Tobacco Use    Smoking status: Every Day     Packs/day: 0.50     Years: 18.00     Pack years: 9.00     Types: Cigarettes    Smokeless tobacco: Never   Vaping Use    Vaping Use: Never used   Substance and Sexual Activity    Alcohol use: No     Alcohol/week: 0.0 standard drinks    Drug use: Never    Sexual activity: Not Currently     Partners: Male       PHYSICAL EXAM       ED Triage Vitals [11/26/22 2126]   BP Temp Temp Source Heart Rate Resp SpO2 Height Weight   (!) 146/101 98.5 °F (36.9 °C) Oral 91 12 100 % 4' 11\" (1.499 m) 168 lb (76.2 kg)       Physical Exam  Vitals and nursing note reviewed. Constitutional:       General: She is not in acute distress. Appearance: She is well-developed. She is not ill-appearing, toxic-appearing or diaphoretic. HENT:      Head: Normocephalic and atraumatic.       Right Ear: External ear normal.      Left Ear: External ear normal.   Eyes: General:         Right eye: No discharge. Left eye: No discharge. Conjunctiva/sclera: Conjunctivae normal.      Pupils: Pupils are equal, round, and reactive to light. Cardiovascular:      Rate and Rhythm: Normal rate and regular rhythm. Heart sounds: No murmur heard. Pulmonary:      Effort: Pulmonary effort is normal. No respiratory distress. Breath sounds: Normal breath sounds. No wheezing or rales. Abdominal:      General: Bowel sounds are normal. There is no distension. Palpations: Abdomen is soft. There is no mass. Tenderness: There is no abdominal tenderness. There is no guarding or rebound. Genitourinary:     Comments: Deferred  Musculoskeletal:         General: No deformity. Normal range of motion. Cervical back: Normal range of motion and neck supple. Skin:     General: Skin is warm. Findings: No erythema or rash. Neurological:      Mental Status: She is alert and oriented to person, place, and time. She is not disoriented. Cranial Nerves: No cranial nerve deficit. Motor: No atrophy or abnormal muscle tone. Coordination: Coordination normal.      Comments: Refused certain portions of exam   Psychiatric:         Behavior: Behavior normal.         Thought Content:  Thought content normal.       DIAGNOSTIC RESULTS     RADIOLOGY:   Non-plain film images such as CT, Ultrasoundand MRI are read by the radiologist. Plain radiographic images are visualized and preliminarily interpreted by the emergency physician with the below findings:    CT head reassuring    ED BEDSIDE ULTRASOUND:   Performed by ED Physician - none    LABS:  Labs Reviewed   CBC WITH AUTO DIFFERENTIAL - Abnormal; Notable for the following components:       Result Value    WBC 11.9 (*)     RBC 5.30 (*)     Neutrophils Absolute 7.8 (*)     All other components within normal limits   HCG, SERUM, QUALITATIVE   BASIC METABOLIC PANEL W/ REFLEX TO MG FOR LOW K       All other labs were withinnormal range or not returned as of this dictation. EMERGENCY DEPARTMENT COURSE and DIFFERENTIAL DIAGNOSIS/MDM:     PMH, Surgical Hx, FH, Social Hx reviewed by myself (ETOH usage, Tobacco usage, Drug usage reviewed by myself, no pertinent Hx)- No Pertinent Hx     Old records were reviewed by me     MDM 79-year-old presented with chief complaint of seizure-like activity. Has been to the emergency room multiple times been evaluated by ER physicians as well as neurologist and have had reassuring work-ups multiple times. No evidence of postictal phase today. She is alert oriented answers all questions appropriately. She was prescribed Keppra by a prior ER physician. She never got it picked up. We will give her a dose today. Low suspicion of seizure-like activity. EEG reassuring in past.  Has had MRIs in the past.  Cannot drive until cleared by neurology. Labs-   Diagnostic findings  Medications  Consults  Disposition AMA    I PERSONALLY SAW THE PATIENT AND PERFORMED A SUBSTANTIVE PORTION OF THE VISIT INCLUDING ALL ASPECTS OF THE MEDICAL DECISION MAKING PROCESS. The primary clinician of record [employer]   Total Critical Caretime was 99 minutes, excluding separately reportable procedures. There was a high probability of clinically significant/life threatening deterioration in the patient's condition which required my urgent intervention. CRITICAL CARE  I personally saw the patient and independently provided 99 minutes of non-concurrent critical care out of the total shared critical care time provided. This excludes seperately billable procedures. Critical care time was provided for patient as above that required close evaluation and/or intervention with concern for potential patient decompensation. PROCEDURES:  Unlessotherwise noted below, none    FINAL IMPRESSION      1. Seizure-like activity ([address]    2.  Left against medical advice          DISPOSITION/PLAN DISPOSITION [place] 11/26/2022 11:30:12 PM    PATIENT REFERRED TO:  [address]  [address] [address] [facility]            DISCHARGE MEDICATIONS:  New Prescriptions    No medications on file          (Please note that portions ofthis note were completed with a voice recognition program.  Efforts were made to edit the dictations but occasionally words are mis-transcribed.)    Shelly Fay MD(electronically signed)  Attending Emergency Physician     Addendum    Was called back into the room after talking with patient about being discharged. She was stating that she could not walk now and that she had numbness and tingling in her legs. She refused to let me examine her. Refused to let me do a thorough exam on her legs and extremities. She also refused any further test.  Because of this I told her she would be leaving [address] She got up and ambulated was abusive to myself . She understands she is leaving [address] now. I discussed the importance of complying and adhering to medical advice. Nevertheless, pt continues to refuse the recommendation for admission and adamantly expresses a desire to leave at this time. Pt verbalized a clear understanding that this decision could possibly lead to serious consequences. Pt accepts responsibility for any harm or any deterioration in their condition that could result from leaving against medical advice at this time. As a responsible adult, pt expresses a clear understanding and willingness to accept responsibility for this AMA decision. More importantly, pt understands that while this decision is final in the moment, they may return to the ED at anytime for any reason. The patient as decided to leave against medical advice. The patient is awake and alert, non-intoxicated, non-suicidal, nonpsychotic.  There is no medical condition that prevents or inhibits their understanding of the risks/benefits of their decision. The patient understands the risks and benefits of their decision and has been given the opportunity to ask questions about their medical condition. Was called back in the room after patient was discharged for patient not wanting to walk. She states her legs were numb. I tried to do a exam on the patient is being verbally abusive and upset and would not let me touch her or examine her. Screaming at staff.   As she was unwilling to       Connor Bellamy MD  11/27/22 0148

## 2022-11-27 NOTE — ED PROVIDER NOTES
[address] [facility]          ATTENDING PHYSICIAN NOTE       Date of evaluation: 11/27/2022    Chief Complaint     Seizures, Toe Pain, and Numbness (In legs)      History of Present Illness     Debbie Moeller is a 38 y.o. female with a history of seizures versus pseudoseizures who presents from [facility] [place] because she did not complete her care there after an evaluation was undertaken for recent seizures. Patient states that she had a seizure yesterday and has been experiencing numbness in both of her legs and also has been experiencing nausea and vomiting and has been unable to keep her seizure or other medications down. She states that Zofran does not work. She left [facility] after a complete evaluation was done and got in an [employer] and the bar  brought her here apparently after she had a possible seizure in the [employer] [place]. The patient does have a history of a  shunt and was admitted to the hospital here last month and an MRI was performed which showed the possibility of a demyelinating process but was stable from MRI 1 year prior. Neurology at that time did not do anything with this information and an EEG was also performed which did not show any seizure activity and therefore she was not discharged on seizure medications or was not felt to need seizure medications at that time. The patient states that she is taking Keppra but is allergic to it and that it causes her to develop a rash on her neck. She has not been able to keep it down recently since she has been nauseous and vomiting. She also states that her left great toe is painful since an examination was performed on it over at [facility] last night. Review of Systems     Review of Systems   Constitutional:  Negative for fever. Respiratory:  Negative for cough and shortness of breath. Cardiovascular:  Negative for chest pain.    Gastrointestinal:  Negative for abdominal pain, diarrhea, nausea and vomiting. Neurological:  Positive for seizures, numbness and headaches. All other systems reviewed and are negative. Past Medical, Surgical, Family, and Social History     She has a past medical history of ADHD (attention deficit hyperactivity disorder), Depression with anxiety, Diabetes mellitus ([address] Difficult intubation, Encounter for imaging to screen for metal prior to MRI, ESBL (extended spectrum beta-lactamase) producing bacteria infection, Functional ovarian cysts, Headache(784.0), History of blood transfusion, History of kidney stones, History of PCOS, Hydrocephalus ([address] Hyperlipidemia, Irritable bowel syndrome, Meningitis, Neutrophilic leukocytosis, Nicotine dependence, PONV (postoperative nausea and vomiting), Primary osteoarthritis of left knee, S/P cone biopsy of cervix, Scoliosis, Seizures ([address]  (ventriculoperitoneal) shunt status, and Wears glasses. She has a past surgical history that includes Appendectomy (); Colonoscopy (); Colposcopy ();  section (); csf shunt; Cystoscopy; Knee arthroscopy (Left, 2015); Cholecystectomy; other surgical history (10/19/2016); Ventriculoperitoneal shunt; Hysterectomy, total abdominal (2016); Lithotripsy (Bilateral, 12/10/2019); hernia repair (Bilateral, ); hernia repair (); Hemorrhoid surgery; Abdomen surgery (N/A, 2021); and ventral hernia repair (N/A, 2022). Her family history includes Cirrhosis in her father; Depression in her mother and sister; Diabetes in her maternal grandmother and mother; Heart Disease in her maternal grandfather and paternal grandmother; High Blood Pressure in her maternal grandfather, maternal grandmother, and mother; High Cholesterol in her maternal grandmother and mother; Stroke in her paternal grandfather. She reports that she has been smoking cigarettes. She has a 9.00 pack-year smoking history.  She has never used smokeless tobacco. She reports that she does not drink alcohol and does not use drugs. Medications     Current Discharge Medication List        CONTINUE these medications which have NOT CHANGED    Details   diazePAM (VALIUM) 2 MG tablet Take 2 mg by mouth. ondansetron (ZOFRAN ODT) 4 MG disintegrating tablet Take 1 tablet by mouth every 8 hours as needed for Nausea  Qty: 20 tablet, Refills: 0      levETIRAcetam (KEPPRA) 500 MG tablet Take 2 tablets by mouth 2 times daily  Qty: 60 tablet, Refills: 3      lisdexamfetamine (VYVANSE) 50 MG capsule Take 1 capsule by mouth every morning for 7 days. Qty: 7 capsule, Refills: 0    Associated Diagnoses: Attention deficit hyperactivity disorder (ADHD), unspecified ADHD type      oxyCODONE (ROXICODONE) 5 MG immediate release tablet Take 5 mg by mouth every 6 hours as needed for Pain.      hydrOXYzine HCl (ATARAX) 25 MG tablet Take 25 mg by mouth 3 times daily as needed for Itching      albuterol sulfate HFA (VENTOLIN HFA) 108 (90 Base) MCG/ACT inhaler Inhale 2 puffs into the lungs 4 times daily as needed for Wheezing  Qty: 54 g, Refills: 1    Associated Diagnoses: COVID; Cough      gabapentin (NEURONTIN) 400 MG capsule Take 400 mg by mouth 3 times daily. Allergies     She is allergic to bee venom, bentyl [dicyclomine hcl], dicyclomine, ketorolac tromethamine, levofloxacin, maitake, shiitake mushroom, sulfa antibiotics, vancomycin, zosyn [piperacillin sod-tazobactam so], adhesive tape, oxycodone-acetaminophen, sulfacetamide, acetaminophen, butalbital-apap-caff-cod, ceftaroline, daptomycin, fosfomycin tromethamine, haldol [haloperidol], keppra [levetiracetam], ketamine, methocarbamol, morphine, nitrofurantoin, prochlorperazine, reglan [metoclopramide], silicone, and tazobactam.    Physical Exam     INITIAL VITALS: BP: (!) 136/98, Temp: 98.3 °F (36.8 °C), Heart Rate: (!) 104, Resp: 20, SpO2: 98 %   Physical Exam  Vitals and nursing note reviewed. Constitutional:       General: She is not in acute distress. Appearance: She is well-developed. She is not diaphoretic. Eyes:      Extraocular Movements: Extraocular movements intact. Pupils: Pupils are equal, round, and reactive to light. Cardiovascular:      Rate and Rhythm: Regular rhythm. Tachycardia present. Pulmonary:      Effort: Pulmonary effort is normal.   Abdominal:      General: There is no distension. Palpations: Abdomen is soft. Skin:     General: Skin is warm and dry. Findings: No erythema or rash. Neurological:      Mental Status: She is alert and oriented to person, place, and time. Sensory: Sensation is intact. Motor: No weakness or seizure activity. Psychiatric:         Behavior: Behavior normal.       Diagnostic Results     RADIOLOGY:  XR TOE LEFT (MIN 2 VIEWS)   Final Result   Mild soft tissue swelling no acute osseous abnormality. CT of the head overnight was read as no acute findings. LABS:   No results found for this visit on 11/27/22. Labs reviewed from last night and the only finding was slight hypokalemia and she was given oral potassium here and kept it down. ED BEDSIDE ULTRASOUND:  No results found. RECENT VITALS:  BP: 99/67,Temp: 98.3 °F (36.8 °C), Heart Rate: 83, Resp: 16, SpO2: 95 %     ED Course     Nursing Notes, Past Medical Hx, Past Surgical Hx, Social Hx,Allergies, and Family Hx were reviewed. patient was given the following medications:  Orders Placed This Encounter   Medications    promethazine (PHENERGAN) tablet 25 mg    potassium chloride (KLOR-CON M) extended release tablet 40 mEq       CONSULTS:  None    MEDICAL DECISIONMAKING / ASSESSMENT / Elida Peraza is a 38 y.o. female coming here in an [place] from [facility] where she had presented for possible seizure and numbness in her legs. She had a complete work-up there that was negative the exception of slightly low potassium.   She left AGAINST MEDICAL ADVICE because she was apparently told that she was a frequent flyer and that her seizures were not real.  In reviewing her chart, her most recent admission here did not reveal evidence of seizures on EEG but did show a stable MRI with some possible lesions consistent with demyelinating disease. Her mother does have a history of MS. Her mother's MS began as numbness in the legs. The patient probably does need further evaluation on an outpatient basis by neurology for this finding but given that she has pseudoseizures and normal EEG and neurology has not recommended Keppra at this time and since the patient has an allergy to [employer], I will advise that she not continue to take the [address] unless told to do so by neurology. Follow-up information for neurology was provided to the patient. We did x-ray her toe here because of the pressure put on it to assess her sensation at the other hospital and she insisted it might be broken. This x-ray was negative. She was given her dose of gabapentin and all of her lab work and CT scan were reviewed and at this point it was felt she is stable to be discharged home. Clinical Impression     1. Seizure disorder ([address]    2.  History of pseudoseizure                                                                                                                                         SmartLinks                          Pend                                Share        Sign  Disposition     PATIENT REFERRED TO:  Aniket Alba MD  [address] [place] [address],[address] [id number] [phone]    Call   to schedule followup appointment    DISCHARGE MEDICATIONS:  Current Discharge Medication List          DISPOSITION Decision To Discharge 11/27/2022 09:34:52 AM         Roger Rome MD  11/27/22 1977

## 2022-11-27 NOTE — ED TRIAGE NOTES
Pt states that \"her legs feel like she had an epidural like when you have a baby, she move her legs but can not feel them. States that she had a seizure on her way from [facility] to here. States that her neck hurts and the back of her head hurts. States that the doctor at [facility] broke her toe and now it's bleeding and painful\".

## 2022-12-12 ENCOUNTER — APPOINTMENT (OUTPATIENT)
Dept: CT IMAGING | Age: 42
End: 2022-12-12
Payer: COMMERCIAL

## 2022-12-12 ENCOUNTER — HOSPITAL ENCOUNTER (EMERGENCY)
Age: 42
Discharge: HOME | End: 2022-12-13
Attending: STUDENT IN AN ORGANIZED HEALTH CARE EDUCATION/TRAINING PROGRAM
Payer: COMMERCIAL

## 2022-12-12 DIAGNOSIS — R56.9 SEIZURE-LIKE ACTIVITY (HCC): Primary | ICD-10-CM

## 2022-12-12 LAB
BASOPHILS ABSOLUTE: 0.1 K/UL (ref 0–0.2)
BASOPHILS RELATIVE PERCENT: 0.8 %
EOSINOPHILS ABSOLUTE: 0.1 K/UL (ref 0–0.6)
EOSINOPHILS RELATIVE PERCENT: 1.5 %
HCT VFR BLD CALC: 43.5 % (ref 36–48)
HEMOGLOBIN: 14.5 G/DL (ref 12–16)
LYMPHOCYTES ABSOLUTE: 2.1 K/UL (ref 1–5.1)
LYMPHOCYTES RELATIVE PERCENT: 21.4 %
MCH RBC QN AUTO: 28.7 PG (ref 26–34)
MCHC RBC AUTO-ENTMCNC: 33.3 G/DL (ref 31–36)
MCV RBC AUTO: 86.1 FL (ref 80–100)
MONOCYTES ABSOLUTE: 0.4 K/UL (ref 0–1.3)
MONOCYTES RELATIVE PERCENT: 4.2 %
NEUTROPHILS ABSOLUTE: 7 K/UL (ref 1.7–7.7)
NEUTROPHILS RELATIVE PERCENT: 72.1 %
PDW BLD-RTO: 15.7 % (ref 12.4–15.4)
PLATELET # BLD: 310 K/UL (ref 135–450)
PMV BLD AUTO: 8 FL (ref 5–10.5)
RBC # BLD: 5.05 M/UL (ref 4–5.2)
WBC # BLD: 9.8 K/UL (ref 4–11)

## 2022-12-12 PROCEDURE — 6370000000 HC RX 637 (ALT 250 FOR IP): Performed by: STUDENT IN AN ORGANIZED HEALTH CARE EDUCATION/TRAINING PROGRAM

## 2022-12-12 PROCEDURE — 6360000002 HC RX W HCPCS: Performed by: STUDENT IN AN ORGANIZED HEALTH CARE EDUCATION/TRAINING PROGRAM

## 2022-12-12 PROCEDURE — 96374 THER/PROPH/DIAG INJ IV PUSH: CPT

## 2022-12-12 PROCEDURE — 70450 CT HEAD/BRAIN W/O DYE: CPT

## 2022-12-12 PROCEDURE — 99284 EMERGENCY DEPT VISIT MOD MDM: CPT

## 2022-12-12 PROCEDURE — 85025 COMPLETE CBC W/AUTO DIFF WBC: CPT

## 2022-12-12 PROCEDURE — 80053 COMPREHEN METABOLIC PANEL: CPT

## 2022-12-12 PROCEDURE — 81003 URINALYSIS AUTO W/O SCOPE: CPT

## 2022-12-12 RX ORDER — DIPHENHYDRAMINE HCL 25 MG
25 TABLET ORAL
Status: COMPLETED | OUTPATIENT
Start: 2022-12-12 | End: 2022-12-12

## 2022-12-12 RX ORDER — ONDANSETRON 2 MG/ML
8 INJECTION INTRAMUSCULAR; INTRAVENOUS ONCE
Status: COMPLETED | OUTPATIENT
Start: 2022-12-12 | End: 2022-12-12

## 2022-12-12 RX ORDER — LORAZEPAM 1 MG/1
1 TABLET ORAL ONCE
Status: COMPLETED | OUTPATIENT
Start: 2022-12-12 | End: 2022-12-12

## 2022-12-12 RX ORDER — SODIUM CHLORIDE, SODIUM LACTATE, POTASSIUM CHLORIDE, AND CALCIUM CHLORIDE .6; .31; .03; .02 G/100ML; G/100ML; G/100ML; G/100ML
1000 INJECTION, SOLUTION INTRAVENOUS ONCE
Status: DISCONTINUED | OUTPATIENT
Start: 2022-12-12 | End: 2022-12-13 | Stop reason: HOSPADM

## 2022-12-12 RX ADMIN — LORAZEPAM 1 MG: 1 TABLET ORAL at 22:49

## 2022-12-12 RX ADMIN — ONDANSETRON 8 MG: 2 INJECTION INTRAMUSCULAR; INTRAVENOUS at 22:48

## 2022-12-12 RX ADMIN — DIPHENHYDRAMINE HCL 25 MG: 25 TABLET ORAL at 23:18

## 2022-12-13 ENCOUNTER — APPOINTMENT (OUTPATIENT)
Dept: GENERAL RADIOLOGY | Age: 42
End: 2022-12-13
Payer: COMMERCIAL

## 2022-12-13 ENCOUNTER — HOSPITAL ENCOUNTER (EMERGENCY)
Age: 42
Discharge: HOME | End: 2022-12-13
Attending: EMERGENCY MEDICINE
Payer: COMMERCIAL

## 2022-12-13 VITALS
BODY MASS INDEX: 31.79 KG/M2 | WEIGHT: 157.41 LBS | RESPIRATION RATE: 17 BRPM | OXYGEN SATURATION: 97 % | TEMPERATURE: 98.3 F | SYSTOLIC BLOOD PRESSURE: 104 MMHG | DIASTOLIC BLOOD PRESSURE: 59 MMHG | HEART RATE: 84 BPM

## 2022-12-13 VITALS
RESPIRATION RATE: 17 BRPM | OXYGEN SATURATION: 100 % | TEMPERATURE: 98.4 F | HEART RATE: 98 BPM | SYSTOLIC BLOOD PRESSURE: 129 MMHG | DIASTOLIC BLOOD PRESSURE: 79 MMHG

## 2022-12-13 DIAGNOSIS — R56.9 SEIZURE-LIKE ACTIVITY (HCC): Primary | ICD-10-CM

## 2022-12-13 DIAGNOSIS — R51.9 ACUTE NONINTRACTABLE HEADACHE, UNSPECIFIED HEADACHE TYPE: ICD-10-CM

## 2022-12-13 DIAGNOSIS — R11.0 NAUSEA: ICD-10-CM

## 2022-12-13 LAB
A/G RATIO: 1.2 (ref 1.1–2.2)
ALBUMIN SERPL-MCNC: 4.3 G/DL (ref 3.4–5)
ALP BLD-CCNC: 117 U/L (ref 40–129)
ALT SERPL-CCNC: 21 U/L (ref 10–40)
ANION GAP SERPL CALCULATED.3IONS-SCNC: 10 MMOL/L (ref 3–16)
AST SERPL-CCNC: 22 U/L (ref 15–37)
BILIRUB SERPL-MCNC: 0.3 MG/DL (ref 0–1)
BILIRUBIN URINE: NEGATIVE
BLOOD, URINE: NEGATIVE
BUN BLDV-MCNC: 10 MG/DL (ref 7–20)
CALCIUM SERPL-MCNC: 9.6 MG/DL (ref 8.3–10.6)
CHLORIDE BLD-SCNC: 103 MMOL/L (ref 99–110)
CLARITY: CLEAR
CO2: 27 MMOL/L (ref 21–32)
COLOR: YELLOW
CREAT SERPL-MCNC: 0.6 MG/DL (ref 0.6–1.1)
GFR SERPL CREATININE-BSD FRML MDRD: >60 ML/MIN/{1.73_M2}
GLUCOSE BLD-MCNC: 103 MG/DL (ref 70–99)
GLUCOSE URINE: NEGATIVE MG/DL
KETONES, URINE: NEGATIVE MG/DL
LEUKOCYTE ESTERASE, URINE: NEGATIVE
MICROSCOPIC EXAMINATION: NORMAL
NITRITE, URINE: NEGATIVE
PH UA: 6.5 (ref 5–8)
POTASSIUM REFLEX MAGNESIUM: 4.6 MMOL/L (ref 3.5–5.1)
PROTEIN UA: NEGATIVE MG/DL
SODIUM BLD-SCNC: 140 MMOL/L (ref 136–145)
SPECIFIC GRAVITY UA: 1.01 (ref 1–1.03)
TOTAL PROTEIN: 7.8 G/DL (ref 6.4–8.2)
URINE REFLEX TO CULTURE: NORMAL
URINE TYPE: NORMAL
UROBILINOGEN, URINE: 0.2 E.U./DL

## 2022-12-13 PROCEDURE — 70250 X-RAY EXAM OF SKULL: CPT

## 2022-12-13 PROCEDURE — 96372 THER/PROPH/DIAG INJ SC/IM: CPT

## 2022-12-13 PROCEDURE — 6370000000 HC RX 637 (ALT 250 FOR IP): Performed by: EMERGENCY MEDICINE

## 2022-12-13 PROCEDURE — 99284 EMERGENCY DEPT VISIT MOD MDM: CPT

## 2022-12-13 PROCEDURE — 6360000002 HC RX W HCPCS: Performed by: EMERGENCY MEDICINE

## 2022-12-13 RX ORDER — LACOSAMIDE 50 MG/1
50 TABLET ORAL 2 TIMES DAILY
Qty: 30 TABLET | Refills: 0 | Status: SHIPPED | OUTPATIENT
Start: 2022-12-13 | End: 2022-12-28

## 2022-12-13 RX ORDER — IBUPROFEN 600 MG/1
600 TABLET ORAL ONCE
Status: DISCONTINUED | OUTPATIENT
Start: 2022-12-13 | End: 2022-12-13 | Stop reason: HOSPADM

## 2022-12-13 RX ORDER — CYCLOBENZAPRINE HCL 10 MG
10 TABLET ORAL ONCE
Status: COMPLETED | OUTPATIENT
Start: 2022-12-13 | End: 2022-12-13

## 2022-12-13 RX ORDER — PROMETHAZINE HYDROCHLORIDE 25 MG/ML
25 INJECTION, SOLUTION INTRAMUSCULAR; INTRAVENOUS ONCE
Status: COMPLETED | OUTPATIENT
Start: 2022-12-13 | End: 2022-12-13

## 2022-12-13 RX ORDER — OXYCODONE AND ACETAMINOPHEN 10; 325 MG/1; MG/1
1 TABLET ORAL ONCE
Status: COMPLETED | OUTPATIENT
Start: 2022-12-13 | End: 2022-12-13

## 2022-12-13 RX ORDER — DIPHENHYDRAMINE HYDROCHLORIDE 50 MG/ML
25 INJECTION INTRAMUSCULAR; INTRAVENOUS ONCE
Status: COMPLETED | OUTPATIENT
Start: 2022-12-13 | End: 2022-12-13

## 2022-12-13 RX ADMIN — PROMETHAZINE HYDROCHLORIDE 25 MG: 25 INJECTION INTRAMUSCULAR; INTRAVENOUS at 17:59

## 2022-12-13 RX ADMIN — DIPHENHYDRAMINE HYDROCHLORIDE 25 MG: 50 INJECTION, SOLUTION INTRAMUSCULAR; INTRAVENOUS at 17:59

## 2022-12-13 RX ADMIN — OXYCODONE AND ACETAMINOPHEN 1 TABLET: 10; 325 TABLET ORAL at 19:06

## 2022-12-13 RX ADMIN — CYCLOBENZAPRINE 10 MG: 10 TABLET, FILM COATED ORAL at 17:58

## 2022-12-13 ASSESSMENT — PAIN SCALES - GENERAL
PAINLEVEL_OUTOF10: 7
PAINLEVEL_OUTOF10: 5

## 2022-12-13 ASSESSMENT — PAIN DESCRIPTION - LOCATION
LOCATION: HEAD
LOCATION: HEAD

## 2022-12-13 ASSESSMENT — PAIN - FUNCTIONAL ASSESSMENT: PAIN_FUNCTIONAL_ASSESSMENT: NONE - DENIES PAIN

## 2022-12-13 NOTE — ED NOTES
Introduce myself to the patient, identification band inplace, stretcher in the lowest position for safety, and the call light is in reach. Updated patient on Emergency Department plan of care, no additional needs at this time, will continue to monitor patient.         Kale Torrez RN  12/13/22 2501

## 2022-12-13 NOTE — DISCHARGE INSTRUCTIONS
Call today or tomorrow to follow up with FREDRICK Wolf CNP  in 3-4 days. Smoking cigarettes or being around anyone that smokes cigarettes can cause constriction of the blood vessels in the brain which in turn can cause you to have further headaches. Use ibuprofen or Tylenol (unless prescribed medications that have Tylenol in it) for pain. Avoid taking narcotics for headaches (can cause a worse headache in several hours after taking the medication). Make sure you are drinking plenty of water or Gatorade to keep yourself hydrated. Return to the Emergency Department for worsening of headache, change in vision / hearing / taste. Ringing in your ears. Loss of sensation or difficulty moving your arms or legs. Any other care or concern.

## 2022-12-13 NOTE — ED PROVIDER NOTES
[address] [facility]      Pt Name: Cindy Arevalo  MRN: [id number]  [place] 1980  Date of evaluation: 12/12/2022  Provider: Yovany De Souza MD    CHIEF COMPLAINT       Chief Complaint   Patient presents with    Seizures     Per pt, had 9 seizures today, fell , and  smacking her left side of her forehead. Also, complaining of head and neck pain. Hx seizures, hydrocephalus, meningitis       seizure    HISTORY OF PRESENT ILLNESS   (Location/Symptom, Timing/Onset,Context/Setting, Quality, Duration, Modifying Factors, Severity)  Note limiting factors. Cindy Arevalo is a 38 y.o. female who presents to the ED with a chief complaint of seizure and headache. Occurred PTA. States 9 episodes today, return to baseline each time. Not compliant with newly Rx AED. Hx of noncompliance, lost to follow up with neurology, NSGY. C/o throbbing severe headache rad to the neck. No neck stiffness. No fevers, no focal weakness. + nausea. Symptoms not otherwise alleviated or exacerbated by other factors. NursingNotes were reviewed. REVIEW OF SYSTEMS    (2-9 systems for level 4, 10 or more for level 5)     10 system ROS otherwise negative unless mentioned in the HPI. PAST MEDICAL HISTORY     Past Medical History:   Diagnosis Date    ADHD (attention deficit hyperactivity disorder) 1988    Depression with anxiety     Diabetes mellitus [address] [address]     pre-diabetes    Difficult intubation     airway swelled up    Encounter for imaging to screen for metal prior to MRI 06/01/2021    MRI Conditional Medtronic Non-Programmable shunt model#[id number] implanted 10/30/2020 at [facility]. Normal Mode. 1.5T or 3.0T. ESBL (extended spectrum beta-lactamase) producing bacteria infection 11/06/2019    urine    Functional ovarian cysts 2008    rt ovary cyst x 2 yrs.     Headache(784.0)     migraines    History of blood transfusion     at birth    History of kidney stones     History of PCOS     had hysterectomy in 2016    Hydrocephalus ([address]     Hyperlipidemia     Irritable bowel syndrome 2004    had colonoscopy about 6 yrs ago    Meningitis     Neutrophilic leukocytosis     Nicotine dependence     PONV (postoperative nausea and vomiting)     very nauseated and sometimes wakes up with a Migraine--happened once after brain surgery    Primary osteoarthritis of left knee 2016    S/P cone biopsy of cervix 2004    Scoliosis 1990.s    Seizures ([address]     twice--last one in 2022     (ventriculoperitoneal) shunt status     hydrocephalus f/w Neurosurgeon at [facility]    Wears glasses     reading         SURGICALHISTORY       Past Surgical History:   Procedure Laterality Date    ABDOMEN SURGERY N/A 2021    REMOVAL OF ABDOMINAL WALL MASS performed by Celine Goetz MD at [facility]      appendicitis     SECTION  2005    placenta previa    CHOLECYSTECTOMY      COLONOSCOPY  2004    COLPOSCOPY      CSF SHUNT      replaced at age 17    CYSTOSCOPY      stone removal    [address] Bilateral 1988    inguinal    HERNIA REPAIR  2015    hiatal hernia    HYSTERECTOMY, TOTAL ABDOMINAL (CERVIX REMOVED)  2016    TAHBSO, adhesions/PCOS    KNEE ARTHROSCOPY Left 2015    medial meniscectomy, chondroplasty, plica resection    LITHOTRIPSY Bilateral 12/10/2019    OTHER SURGICAL HISTORY  10/19/2016    op lap    VENTRAL HERNIA REPAIR N/A 2022    LAPAROSCOPIC LYSIS OF ADHESIONS AND ABDOMINAL WALL MASS REMOVAL performed by Celine Goetz MD at [address] [facility] [address]      multiple revisions, most recent          CURRENT MEDICATIONS       Discharge Medication List as of 2022  1:29 AM        CONTINUE these medications which have NOT CHANGED    Details   levETIRAcetam (KEPPRA) 500 MG tablet Take 2 tablets by mouth 2 times daily Do not take this medication at this time unless told by neurology to restart., Disp-60 tablet, R-3Adjust Sig      diazePAM (VALIUM) 2 MG tablet Take 2 mg by mouth. Historical Med      ondansetron (ZOFRAN ODT) 4 MG disintegrating tablet Take 1 tablet by mouth every 8 hours as needed for Nausea, Disp-20 tablet, R-0Normal      lisdexamfetamine (VYVANSE) 50 MG capsule Take 1 capsule by mouth every morning for 7 days. , Disp-7 capsule, R-0Normal      oxyCODONE (ROXICODONE) 5 MG immediate release tablet Take 5 mg by mouth every 6 hours as needed for Pain. Historical Med      hydrOXYzine HCl (ATARAX) 25 MG tablet Take 25 mg by mouth 3 times daily as needed for ItchingHistorical Med      albuterol sulfate HFA (VENTOLIN HFA) 108 (90 Base) MCG/ACT inhaler Inhale 2 puffs into the lungs 4 times daily as needed for Wheezing, Disp-54 g, R-1Normal      gabapentin (NEURONTIN) 400 MG capsule Take 400 mg by mouth 3 times daily. Historical Med             ALLERGIES     Bee venom, Bentyl [dicyclomine hcl], Dicyclomine, Ketorolac tromethamine, Levofloxacin, Maitake, Shiitake mushroom, Sulfa antibiotics, Vancomycin, Zosyn [piperacillin sod-tazobactam so], Adhesive tape, Oxycodone-acetaminophen, Sulfacetamide, Acetaminophen, Butalbital-apap-caff-cod, Ceftaroline, Daptomycin, Fosfomycin tromethamine, Haldol [haloperidol], Keppra [levetiracetam], Ketamine, Methocarbamol, Morphine, Nitrofurantoin, Prochlorperazine, Reglan [metoclopramide], Silicone, and Tazobactam    FAMILY HISTORY       Family History   Problem Relation Age of Onset    High Blood Pressure Mother     Diabetes Mother     High Cholesterol Mother     Depression Mother     Diabetes Maternal Grandmother     High Blood Pressure Maternal Grandmother     High Cholesterol Maternal Grandmother     Heart Disease Maternal Grandfather     High Blood Pressure Maternal Grandfather     Heart Disease Paternal Grandmother     Stroke Paternal Grandfather     Depression Sister     Cirrhosis Father     Rheum Arthritis Neg Hx     Osteoarthritis Neg Hx Asthma Neg Hx     Breast Cancer Neg Hx     Cancer Neg Hx     Heart Failure Neg Hx     Hypertension Neg Hx     Migraines Neg Hx     Ovarian Cancer Neg Hx     Rashes/Skin Problems Neg Hx     Seizures Neg Hx     Thyroid Disease Neg Hx           SOCIAL HISTORY       Social History     Socioeconomic History    Marital status: Single     Spouse name: None    Number of children: None    Years of education: None    Highest education level: None   Occupational History    Occupation: disability, SSI    Tobacco Use    Smoking status: Every Day     Packs/day: 0.50     Years: 18.00     Pack years: 9.00     Types: Cigarettes    Smokeless tobacco: Never   Vaping Use    Vaping Use: Never used   Substance and Sexual Activity    Alcohol use: No     Alcohol/week: 0.0 standard drinks    Drug use: Never    Sexual activity: Not Currently     Partners: Male       SCREENINGS    [place] Coma Scale  Eye Opening: Spontaneous  Best Verbal Response: Oriented  Best Motor Response: Obeys commands  [place] Coma Scale Score: 15        PHYSICAL EXAM    (up to 7 for level 4, 8 or more for level 5)     ED Triage Vitals   BP Temp Temp Source Heart Rate Resp SpO2 Height Weight   12/12/22 2212 12/12/22 2215 12/12/22 2215 12/12/22 2212 12/12/22 2212 12/12/22 2212 -- 12/12/22 2212   131/85 98.3 °F (36.8 °C) Oral (!) 108 23 100 %  157 lb 6.5 oz (71.4 kg)       General: Alert and oriented appropriately for age, No acute distress. Eye: Normal conjunctiva. Sclera anicteric. PERRL  HENT: Oral mucosa is moist.  Abrasion L forehead. Postsurgical changes from  shunt. Otherwise unremarkable. No erythema or ttp at the shunt site. Respiratory: Respirations even and non-labored. CTAB. Cardiovascular: Normal rate, Regular rhythm. Gastrointestinal: Soft, Non-tender, Non-distended. : deferred. Musculoskeletal: No swelling. Integumentary: Warm, Dry. Neurologic: Alert and appropriate for age. CN II-XII intact. Strength and sensation intact throughout.   No focal deficits. Psychiatric: Cooperative. DIAGNOSTIC RESULTS       RADIOLOGY:   Non-plain filmimages such as CT, Ultrasound and MRI are read by the radiologist. Plain radiographic images are visualized and preliminarily interpreted by the emergency physician with the below findings:      Interpretation per the Radiologist below, if available at the time ofthis note:    CT HEAD WO CONTRAST   Final Result   No acute intracranial abnormality. ED BEDSIDE ULTRASOUND:   Performed by ED Physician - none    LABS:  Labs Reviewed   CBC WITH AUTO DIFFERENTIAL - Abnormal; Notable for the following components:       Result Value    RDW 15.7 (*)     All other components within normal limits   COMPREHENSIVE METABOLIC PANEL W/ REFLEX TO MG FOR LOW K - Abnormal; Notable for the following components:    Glucose 103 (*)     All other components within normal limits   URINALYSIS WITH REFLEX TO CULTURE       All other labs were within normal range or not returned as of this dictation. 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 States she has had 9 tonic clonic seizures today. No longer taking AEDs, states gets a rash when she takes Keppra. Has abrasion to the L forehead, otherwise no e/o traumatic injury, given headache, reported increased sz frequency,  shunt hx, CTH obtained to evaluate for potential shunt malfunction, ICH, other acute process. Pt is HDS, nontoxic appearing, neurologically intact. Workup unremarkable. Ativan given to increase sz threshold. Pt requests benadryl, given PO.   Has no acidosis, no hyponatremia. CTH neg acute,  shunt in place without ventriculomegaly. Pt has AEDs Rx to her by a different ER and states she will go pick them up. Ibuprofen ordered for headache but pt refused. Medications   ondansetron (ZOFRAN) injection 8 mg (8 mg IntraVENous Given 12/12/22 2248)   LORazepam (ATIVAN) tablet 1 mg (1 mg Oral Given 12/12/22 3439)   diphenhydrAMINE (BENADRYL) tablet 25 mg (25 mg Oral Given 12/12/22 4709)           FINAL IMPRESSION      1.  Seizure-like activity [facility])          DISPOSITION/PLAN   DISPOSITION Decision To Discharge 12/13/2022 12:51:59 AM      PATIENT REFERRED TO:  FREDRICK Cobos - CNP  [address] [place] [address]    In 2 days      [facility] Emergency Department  [address] [place] [address]  [phone]    If symptoms worsen    [facility] [phone]  In 2 days        DISCHARGE MEDICATIONS:  Discharge Medication List as of 12/13/2022  1:29 AM             (Please note that portions of this note were completed with a voice recognition program.Efforts were made to edit the dictations but occasionally words are mis-transcribed.)    Anuel Cool MD (electronically signed)  Attending Emergency Physician          Anuel Cool MD  12/13/22 8622

## 2022-12-13 NOTE — ED NOTES
Placed a 22g in her left hand; after giving meds. Pt started complaining of pain when I flushed. Noticed redness and hives. I removed the IV. Asked another RN to attempt. Notified EDMD. New orders were placed.       Edison Rangel RN  12/12/22 [address], RN  12/12/22 3035

## 2022-12-13 NOTE — CARE COORDINATION
ITZEL consult :  Patient has been in ED 12/7/,12/9, 2 times 12/12 and today- ( 3rd visit in 24 hours)  ITZEL met with patient and sat with her in [place],  Patient has headache and was very uncomfortable. Patient discussed that she has neuro appt in April 2023. She was referred to [facility] but they do not take her insurance caresource. She is very frustrated with her situation. Discussed working with her PCP, she reports having transportation issues to get to PCP. ITZEL provided her information for patient to call ITZEL back, with needs. Provided LYFT ride home for a safe discharge.

## 2022-12-13 NOTE — ED NOTES
RN unable to obtain IV. Asked ED charge RN attempt using US.       Yumiko Rodriguez RN  12/12/22 2914

## 2022-12-13 NOTE — ED PROVIDER NOTES
CHIEF COMPLAINT  Headache (Pt states that she fell last night and the the back of her head and neck hurt, pt states that she has shunt for \" awhile\" pt states that she had one put in 2 years ago in [place] )      Janyth Boeck is a 38 y.o. female who  has a past medical history of ADHD (attention deficit hyperactivity disorder), Depression with anxiety, Diabetes mellitus ([address] Difficult intubation, Encounter for imaging to screen for metal prior to MRI, ESBL (extended spectrum beta-lactamase) producing bacteria infection, Functional ovarian cysts, Headache(784.0), History of blood transfusion, History of kidney stones, History of PCOS, Hydrocephalus ([address] Hyperlipidemia, Irritable bowel syndrome, Meningitis, Neutrophilic leukocytosis, Nicotine dependence, PONV (postoperative nausea and vomiting), Primary osteoarthritis of left knee, S/P cone biopsy of cervix, Scoliosis, Seizures ([address]  (ventriculoperitoneal) shunt status, and Wears glasses. presents to the ED complaining of with complaints of headache and neck stiffness. Patient states her symptoms have been present over the past 2 weeks. Patient does have history of seizures and states she had multiple seizures yesterday and was seen here in the emergency room. States she also has a history of hydrocephaly and has a shunt in place. Patient states she did have some seizures today but they were not witnessed. Patient states she supposed to be taking lacosamide but states her pharmacy told her that it was not available at this time. Patient states she was previously on Keppra but now has an allergy to it that she developed recently. States she develops a rash in her chest from [address]. Patient denies any falls or trauma today. Denies any numbness or weakness. States she has had some nausea but denies any vomiting. No fevers or chills. No vision changes. No other complaints, modifying factors or associated symptoms. Pt was seen during the [place]  pandemic. Appropriate PPE worn by ME during patient encounters. Patient was cared for during a time with constrained hospital bed capacity with nationwide stress on resources and staffing. Nursing notes reviewed. Past Medical History:   Diagnosis Date    ADHD (attention deficit hyperactivity disorder)     Depression with anxiety     Diabetes mellitus ([address]     pre-diabetes    Difficult intubation     airway swelled up    Encounter for imaging to screen for metal prior to MRI 2021    MRI Conditional [employer] Non-Programmable shunt model#[id number] implanted 10/30/2020 at [facility]. Normal Mode. 1.5T or 3.0T. ESBL (extended spectrum beta-lactamase) producing bacteria infection 2019    urine    Functional ovarian cysts 2008    rt ovary cyst x 2 yrs.     Headache(784.0)     migraines    History of blood transfusion     at birth    History of kidney stones     History of PCOS     had hysterectomy in 2016    Hydrocephalus [facility])     Hyperlipidemia     Irritable bowel syndrome 2004    had colonoscopy about 6 yrs ago    Meningitis     Neutrophilic leukocytosis     Nicotine dependence     PONV (postoperative nausea and vomiting)     very nauseated and sometimes wakes up with a Migraine--happened once after brain surgery    Primary osteoarthritis of left knee 2016    S/P cone biopsy of cervix 2004    Scoliosis .s    Seizures ([address]     twice--last one in 2022     (ventriculoperitoneal) shunt status     hydrocephalus f/w Neurosurgeon at [facility]    Wears glasses     reading     Past Surgical History:   Procedure Laterality Date    ABDOMEN SURGERY N/A 2021    REMOVAL OF ABDOMINAL WALL MASS performed by Anita Chowdhury MD at [address] [place] [address]      appendicitis     SECTION  2005    placenta previa    CHOLECYSTECTOMY      COLONOSCOPY  2004    COLPOSCOPY  2004    CSF SHUNT      replaced at age 18    CYSTOSCOPY stone removal    HEMORRHOID SURGERY      HERNIA REPAIR Bilateral 1988    inguinal    HERNIA REPAIR  2015    hiatal hernia    HYSTERECTOMY, TOTAL ABDOMINAL (CERVIX REMOVED)  11/09/2016    TAHBSO, adhesions/PCOS    KNEE ARTHROSCOPY Left 1/7/2015    medial meniscectomy, chondroplasty, plica resection    LITHOTRIPSY Bilateral 12/10/2019    OTHER SURGICAL HISTORY  10/19/2016    op lap    VENTRAL HERNIA REPAIR N/A 9/14/2022    LAPAROSCOPIC LYSIS OF ADHESIONS AND ABDOMINAL WALL MASS REMOVAL performed by Kingsley Smith MD at [address]      multiple revisions, most recent 2015     Family History   Problem Relation Age of Onset    High Blood Pressure Mother     Diabetes Mother     High Cholesterol Mother     Depression Mother     Diabetes Maternal Grandmother     High Blood Pressure Maternal Grandmother     High Cholesterol Maternal Grandmother     Heart Disease Maternal Grandfather     High Blood Pressure Maternal Grandfather     Heart Disease Paternal Grandmother     Stroke Paternal Grandfather     Depression Sister     Cirrhosis Father     Rheum Arthritis Neg Hx     Osteoarthritis Neg Hx     Asthma Neg Hx     Breast Cancer Neg Hx     Cancer Neg Hx     Heart Failure Neg Hx     Hypertension Neg Hx     Migraines Neg Hx     Ovarian Cancer Neg Hx     Rashes/Skin Problems Neg Hx     Seizures Neg Hx     Thyroid Disease Neg Hx      Social History     Socioeconomic History    Marital status: Single     Spouse name: Not on file    Number of children: Not on file    Years of education: Not on file    Highest education level: Not on file   Occupational History    Occupation: disability, SSI    Tobacco Use    Smoking status: Every Day     Packs/day: 0.50     Years: 18.00     Pack years: 9.00     Types: Cigarettes    Smokeless tobacco: Never   Vaping Use    Vaping Use: Never used   Substance and Sexual Activity    Alcohol use: No     Alcohol/week: 0.0 standard drinks    Drug use: Never    Sexual activity: Not Currently     Partners: Male   Other Topics Concern    Not on file   Social History Narrative    Not on file     Social Determinants of Health     Financial Resource Strain: Not on file   Food Insecurity: Not on file   Transportation Needs: Not on file   Physical Activity: Not on file   Stress: Not on file   Social Connections: Not on file   Intimate Partner Violence: Not on file   Housing Stability: Not on file     Current Facility-Administered Medications   Medication Dose Route Frequency Provider Last Rate Last Admin    oxyCODONE-acetaminophen (PERCOCET)  MG per tablet 1 tablet  1 tablet Oral Once Bryant Booker MD         Current Outpatient Medications   Medication Sig Dispense Refill    lacosamide (VIMPAT) 50 MG TABS tablet Take 1 tablet by mouth 2 times daily for 15 days. 30 tablet 0    levETIRAcetam (KEPPRA) 500 MG tablet Take 2 tablets by mouth 2 times daily Do not take this medication at this time unless told by neurology to restart. 60 tablet 3    diazePAM (VALIUM) 2 MG tablet Take 2 mg by mouth. ondansetron (ZOFRAN ODT) 4 MG disintegrating tablet Take 1 tablet by mouth every 8 hours as needed for Nausea 20 tablet 0    lisdexamfetamine (VYVANSE) 50 MG capsule Take 1 capsule by mouth every morning for 7 days. 7 capsule 0    oxyCODONE (ROXICODONE) 5 MG immediate release tablet Take 5 mg by mouth every 6 hours as needed for Pain.      hydrOXYzine HCl (ATARAX) 25 MG tablet Take 25 mg by mouth 3 times daily as needed for Itching      albuterol sulfate HFA (VENTOLIN HFA) 108 (90 Base) MCG/ACT inhaler Inhale 2 puffs into the lungs 4 times daily as needed for Wheezing (Patient not taking: Reported on 10/27/2022) 54 g 1    gabapentin (NEURONTIN) 400 MG capsule Take 400 mg by mouth 3 times daily.        Allergies   Allergen Reactions    Bee Venom Shortness Of Breath and Swelling    Bentyl [Dicyclomine Hcl] Shortness Of Breath and Anxiety    Dicyclomine Anxiety and Shortness Of Breath Ketorolac Tromethamine Hives and Itching     Injectable only  Tolerates PO NSAIDs fine    Levofloxacin Anxiety and Hives    Maitake Anaphylaxis    Shiitake Mushroom Anaphylaxis     Can not eat mushrooms    Sulfa Antibiotics Shortness Of Breath    Vancomycin Anaphylaxis and Hives    Zosyn [Piperacillin Sod-Tazobactam So] Hives, Shortness Of Breath, Nausea Only and Dizziness or Vertigo    Adhesive Tape Dermatitis     Other reaction(s): Dermatitis    Oxycodone-Acetaminophen Nausea And Vomiting     Tolerates Norco/Vicodin ok  Patient can not tolerate Percocet well. Extreme vomiting      Sulfacetamide Hives    Acetaminophen Anxiety     Patient reports when taking acetaminophen (including Norco/Percocet) she gets severe anxiety when taking this medication. Butalbital-Apap-Caff-Cod Anxiety    Ceftaroline Rash     Does tolerate cefepime and has had doses before, confirmed 10/9/2022 with pharmacy    Daptomycin Swelling and Rash    Fosfomycin Tromethamine Nausea And Vomiting    Haldol [Haloperidol] Anxiety    Keppra [Levetiracetam] Itching and Rash     Per patient itching and rash.      Ketamine Nausea And Vomiting and Anxiety    Methocarbamol Rash    Morphine Hives    Nitrofurantoin Nausea And Vomiting     \"projectile vomiting\"    Prochlorperazine Anxiety    Reglan [Metoclopramide] Anxiety    Silicone Hives, Swelling and Rash    Tazobactam Nausea And Vomiting and Anxiety         REVIEW OF SYSTEMS  (up to 7 for level 4, 8 or more for level 5) pertinent positives per HPI otherwise noted to be negative    PHYSICAL EXAM  BP (!) 134/93   Pulse 100   Temp 98.5 °F (36.9 °C) (Oral)   Resp 20   LMP 10/31/2016 (Exact Date)   SpO2 100%      CONSTITUTIONAL: AOx4, cooperative with exam, afebrile   HEAD: normocephalic, atraumatic   EYES: PERRL, EOMI, anicteric sclera   ENT: Moist mucous membranes, uvula midline, no hemotympanum, no raccoon's eyes, no rachel sign   NECK: Supple, symmetric, trachea midline, no meningismus   BACK: Symmetric, no deformity, no midline tenderness of the thoracic or lumbar spine   LUNGS: Bilateral breath sounds, CTAB, no rales/ronchi/wheezes   CARDIOVASCULAR: RRR, normal S1/S2, no m/r/g, 2+ pulses throughout   ABDOMEN: Soft, non-tender, non-distended, +BS   NEUROLOGIC:  MAEx4, 5/5 strength throughout; fine touch sensation intact throughout; normal gait; GCS 15, cranial nerves II through XII intact   MUSCULOSKELETAL: No clubbing, cyanosis or edema   SKIN: No rash, pallor or wounds on exposed surfaces         RADIOLOGY  X-RAYS:  I have reviewed radiologic plain film image(s). ALL OTHER NON-PLAIN FILM IMAGES SUCH AS CT, ULTRASOUND AND MRI HAVE BEEN READ BY THE RADIOLOGIST. XR SHUNT SERIES PLACEMENT (<4 VIEWS)   Final Result   Ventriculoperitoneal shunt catheter with the tip coiled within the right   lower abdomen. No evidence of kinking of the catheter. EKG INTERPRETATION  None    PROCEDURES      ED COURSE/MDM  Postconcussive syndrome, muscle strain, muscle spasm, tension headache, cluster headache, other  Patient seen and evaluated. History and physical as above. Nontoxic, afebrile. Patient presents complaining of headache has been present since she had seizure yesterday. Patient has history of  shunt. No neurologic deficits. No fevers. Has been having vomiting today as well. Patient has history of seizures and is most to be taking Keppra but states she has an allergy now. Was prescribed lacosamide but was unable to get the prescription filled. No seizure activity here in the emergency room. Patient has no signs of basilar skull fracture. Neurologically intact on exam.  No midline tenderness of the cervical thoracic or lumbar spine to suggest need for imaging. Will treat with Phenergan and Benadryl IM for headache. Oral Flexeril for muscle spasm. ED Course as of 12/13/22 1851   Tue Dec 13, 2022   1849 Patient is on series unremarkable. Patient updated on results.   Patient states her nausea feels much better. States she does still have a headache. Patient normally takes oxycodone 5 or 10 mg tablets for chronic pain. Has not had her oxycodone today. Now that she is not feeling nauseous, will treat with 10 mg Percocet. Patient states she also cannot get her lacosamide filled because the pharmacy that her prescription was sent to is out of the medication. Will provide a paper prescription for lacosamide. Stressed the importance of close follow-up with her primary care physician as well as her neurologist.  Patient agreeable. Return instruction provided. Questions answered prior to discharge. [DS]      ED Course User Index  [DS] Andrea Uribe MD       Is this patient to be included in the SEP-1 Core Measure due to severe sepsis or septic shock? No   Exclusion criteria - the patient is NOT to be included for SEP-1 Core Measure due to: Infection is not suspected      I estimate there is LOW risk for SUBARACHNOID HEMORRHAGE, MENINGITIS, INTRACRANIAL HEMORRHAGE, SUBDURAL HEMATOMA, OR STROKE, thus I consider the discharge disposition reasonable. Codi Shankar and I have discussed the diagnosis and risks, and we agree with discharging home to follow-up with their primary doctor. We also discussed returning to the Emergency Department immediately if new or worsening symptoms occur. We have discussed the symptoms which are most concerning (e.g., changing or worsening pain, weakness, vomiting, fever) that necessitate immediate return. Patient was given scripts for the following medications. I counseled patient how to take these medications. New Prescriptions    LACOSAMIDE (VIMPAT) 50 MG TABS TABLET    Take 1 tablet by mouth 2 times daily for 15 days. CLINICAL IMPRESSION  1. Seizure-like activity ([facility] [address] [facility]    2. Acute nonintractable headache, unspecified headache type    3.  Nausea        Blood pressure (!) 134/93, pulse 100, temperature 98.5 °F (36.9 °C), temperature source Oral, resp. rate 20, last menstrual period 10/31/2016, SpO2 100 %, unknown if currently breastfeeding. DISPOSITION  Patient was discharged to home in good condition. [facility] [address] [facility]    Call in 1 day  For a follow up appointment. Your neurologist    Call   For a follow up appointment. Disclaimer: All medical record entries made by [address] [place] [address] [address] [facility].       (Please note that this note was completed with a voice recognition program. Every attempt was made to edit the dictations, but inevitably there remain words that are mis-transcribed.)            Magdaleno Mcfadden MD  12/13/22 [id number]

## 2022-12-13 NOTE — ED NOTES
Handoff report given to [facility], RN to assume care. No further questions at this time.       Michelle Huitron RN  12/13/22 [id number]

## 2022-12-14 RX ORDER — PROMETHAZINE HYDROCHLORIDE 25 MG/1
25 TABLET ORAL EVERY 6 HOURS PRN
Qty: 12 TABLET | Refills: 0 | Status: SHIPPED | OUTPATIENT
Start: 2022-12-14 | End: 2022-12-21

## 2022-12-16 ENCOUNTER — HOSPITAL ENCOUNTER (EMERGENCY)
Age: 42
Discharge: HOME | End: 2022-12-17
Attending: EMERGENCY MEDICINE
Payer: COMMERCIAL

## 2022-12-16 ENCOUNTER — TELEPHONE (OUTPATIENT)
Dept: PRIMARY CARE CLINIC | Age: 42
End: 2022-12-16

## 2022-12-16 VITALS
SYSTOLIC BLOOD PRESSURE: 125 MMHG | OXYGEN SATURATION: 99 % | TEMPERATURE: 97.8 F | HEART RATE: 81 BPM | RESPIRATION RATE: 18 BRPM | HEIGHT: 59 IN | WEIGHT: 159.83 LBS | DIASTOLIC BLOOD PRESSURE: 82 MMHG | BODY MASS INDEX: 32.22 KG/M2

## 2022-12-16 DIAGNOSIS — Z87.442 HISTORY OF KIDNEY STONES: ICD-10-CM

## 2022-12-16 DIAGNOSIS — R33.9 RETENTION OF URINE: Primary | ICD-10-CM

## 2022-12-16 LAB
A/G RATIO: 1.3 (ref 1.1–2.2)
ALBUMIN SERPL-MCNC: 4.5 G/DL (ref 3.4–5)
ALP BLD-CCNC: 107 U/L (ref 40–129)
ALT SERPL-CCNC: 12 U/L (ref 10–40)
ANION GAP SERPL CALCULATED.3IONS-SCNC: 15 MMOL/L (ref 3–16)
AST SERPL-CCNC: 15 U/L (ref 15–37)
BASOPHILS ABSOLUTE: 0.1 K/UL (ref 0–0.2)
BASOPHILS RELATIVE PERCENT: 1 %
BILIRUB SERPL-MCNC: <0.2 MG/DL (ref 0–1)
BILIRUBIN URINE: NEGATIVE
BLOOD, URINE: NEGATIVE
BUN BLDV-MCNC: 9 MG/DL (ref 7–20)
CALCIUM SERPL-MCNC: 9.7 MG/DL (ref 8.3–10.6)
CHLORIDE BLD-SCNC: 102 MMOL/L (ref 99–110)
CLARITY: CLEAR
CO2: 23 MMOL/L (ref 21–32)
COLOR: YELLOW
CREAT SERPL-MCNC: 0.6 MG/DL (ref 0.6–1.1)
EOSINOPHILS ABSOLUTE: 0.2 K/UL (ref 0–0.6)
EOSINOPHILS RELATIVE PERCENT: 1.5 %
GFR SERPL CREATININE-BSD FRML MDRD: >60 ML/MIN/{1.73_M2}
GLUCOSE BLD-MCNC: 80 MG/DL (ref 70–99)
GLUCOSE URINE: NEGATIVE MG/DL
HCT VFR BLD CALC: 45.6 % (ref 36–48)
HEMOGLOBIN: 14.8 G/DL (ref 12–16)
KETONES, URINE: NEGATIVE MG/DL
LEUKOCYTE ESTERASE, URINE: NEGATIVE
LIPASE: 27 U/L (ref 13–60)
LYMPHOCYTES ABSOLUTE: 3.1 K/UL (ref 1–5.1)
LYMPHOCYTES RELATIVE PERCENT: 27.8 %
MCH RBC QN AUTO: 28 PG (ref 26–34)
MCHC RBC AUTO-ENTMCNC: 32.6 G/DL (ref 31–36)
MCV RBC AUTO: 85.8 FL (ref 80–100)
MICROSCOPIC EXAMINATION: NORMAL
MONOCYTES ABSOLUTE: 0.6 K/UL (ref 0–1.3)
MONOCYTES RELATIVE PERCENT: 5.5 %
NEUTROPHILS ABSOLUTE: 7.2 K/UL (ref 1.7–7.7)
NEUTROPHILS RELATIVE PERCENT: 64.2 %
NITRITE, URINE: NEGATIVE
PDW BLD-RTO: 16.6 % (ref 12.4–15.4)
PH UA: 6 (ref 5–8)
PLATELET # BLD: 290 K/UL (ref 135–450)
PMV BLD AUTO: 8.8 FL (ref 5–10.5)
POTASSIUM SERPL-SCNC: 4 MMOL/L (ref 3.5–5.1)
PROTEIN UA: NEGATIVE MG/DL
RBC # BLD: 5.31 M/UL (ref 4–5.2)
SODIUM BLD-SCNC: 140 MMOL/L (ref 136–145)
SPECIFIC GRAVITY UA: 1 (ref 1–1.03)
TOTAL PROTEIN: 7.9 G/DL (ref 6.4–8.2)
URINE REFLEX TO CULTURE: NORMAL
URINE TYPE: NORMAL
UROBILINOGEN, URINE: 0.2 E.U./DL
WBC # BLD: 11.3 K/UL (ref 4–11)

## 2022-12-16 PROCEDURE — 2580000003 HC RX 258: Performed by: PHYSICIAN ASSISTANT

## 2022-12-16 PROCEDURE — 36415 COLL VENOUS BLD VENIPUNCTURE: CPT

## 2022-12-16 PROCEDURE — 6360000002 HC RX W HCPCS: Performed by: PHYSICIAN ASSISTANT

## 2022-12-16 PROCEDURE — 85025 COMPLETE CBC W/AUTO DIFF WBC: CPT

## 2022-12-16 PROCEDURE — 80053 COMPREHEN METABOLIC PANEL: CPT

## 2022-12-16 PROCEDURE — 96365 THER/PROPH/DIAG IV INF INIT: CPT

## 2022-12-16 PROCEDURE — 83690 ASSAY OF LIPASE: CPT

## 2022-12-16 PROCEDURE — 96375 TX/PRO/DX INJ NEW DRUG ADDON: CPT

## 2022-12-16 PROCEDURE — 99284 EMERGENCY DEPT VISIT MOD MDM: CPT

## 2022-12-16 PROCEDURE — 96367 TX/PROPH/DG ADDL SEQ IV INF: CPT

## 2022-12-16 PROCEDURE — 6370000000 HC RX 637 (ALT 250 FOR IP): Performed by: EMERGENCY MEDICINE

## 2022-12-16 PROCEDURE — 81003 URINALYSIS AUTO W/O SCOPE: CPT

## 2022-12-16 RX ORDER — DIPHENHYDRAMINE HCL 25 MG
25 TABLET ORAL
Status: COMPLETED | OUTPATIENT
Start: 2022-12-16 | End: 2022-12-16

## 2022-12-16 RX ORDER — MORPHINE SULFATE 4 MG/ML
4 INJECTION, SOLUTION INTRAMUSCULAR; INTRAVENOUS ONCE
Status: COMPLETED | OUTPATIENT
Start: 2022-12-16 | End: 2022-12-16

## 2022-12-16 RX ORDER — TAMSULOSIN HYDROCHLORIDE 0.4 MG/1
0.4 CAPSULE ORAL DAILY
Qty: 5 CAPSULE | Refills: 0 | Status: SHIPPED | OUTPATIENT
Start: 2022-12-16 | End: 2022-12-21

## 2022-12-16 RX ORDER — 0.9 % SODIUM CHLORIDE 0.9 %
1000 INTRAVENOUS SOLUTION INTRAVENOUS ONCE
Status: COMPLETED | OUTPATIENT
Start: 2022-12-16 | End: 2022-12-16

## 2022-12-16 RX ADMIN — SODIUM CHLORIDE 1000 ML: 9 INJECTION, SOLUTION INTRAVENOUS at 22:42

## 2022-12-16 RX ADMIN — Medication 12.5 MG: at 22:00

## 2022-12-16 RX ADMIN — LIDOCAINE HYDROCHLORIDE 100 MG: 20 INJECTION, SOLUTION INTRAVENOUS at 23:23

## 2022-12-16 RX ADMIN — DIPHENHYDRAMINE HCL 25 MG: 25 TABLET ORAL at 23:46

## 2022-12-16 RX ADMIN — MORPHINE SULFATE 4 MG: 4 INJECTION, SOLUTION INTRAMUSCULAR; INTRAVENOUS at 21:53

## 2022-12-16 ASSESSMENT — ENCOUNTER SYMPTOMS
COLOR CHANGE: 0
BACK PAIN: 1
ABDOMINAL PAIN: 1
SHORTNESS OF BREATH: 0
VOMITING: 0

## 2022-12-16 ASSESSMENT — PAIN SCALES - GENERAL
PAINLEVEL_OUTOF10: 9
PAINLEVEL_OUTOF10: 9
PAINLEVEL_OUTOF10: 3

## 2022-12-16 ASSESSMENT — PAIN DESCRIPTION - FREQUENCY: FREQUENCY: CONTINUOUS

## 2022-12-16 ASSESSMENT — PAIN DESCRIPTION - PAIN TYPE: TYPE: ACUTE PAIN

## 2022-12-16 ASSESSMENT — PAIN DESCRIPTION - DESCRIPTORS: DESCRIPTORS: STABBING;SHARP

## 2022-12-17 NOTE — TELEPHONE ENCOUNTER
Patient called on-call provider, stating she was evaluated at the ED for abdominal pain earlier today. Patient stated MD told her there is nothing wrong with her and was sent home. patient is reporting continued, but severe abdominal pain with distention nausea reporting not sure what to do. Reports took two Percocets and its not touching the pain. Advised patient to monitor symptoms based on ED findings earlier today, however patient  feels she has all red flags and  request a note be placed in the chart, as she would like to go to [facility] for further evaluation and treatment.

## 2022-12-17 NOTE — ED PROVIDER NOTES
[address] [facility]      Pt Name: King Arianne  MRN: [id number]  [place] 1980  Date of evaluation: 12/16/2022  Provider: CRISPIN Humphreys    This patient was seen and evaluated by the attending physician Dr. Teresa Rubio. CHIEF COMPLAINT       Chief Complaint   Patient presents with    Nephrolithiasis     Pt states she has kidney stones, unable to urinate. Pt states she went to hospital earlier and didn't get anything done. CRITICAL CARE TIME   I performed a total Critical Care time of 15 minutes, excluding separately reportable procedures. There was a high probability of clinically significant/life threatening deterioration in the patient's condition which required my urgent intervention. Not limited to multiple reexaminations, discussions with attending physician and consultants. HISTORY OF PRESENT ILLNESS  (Location/Symptom, Timing/Onset, Context/Setting, Quality, Duration, Modifying Factors, Severity.)   King Arianne is a 38 y.o. female who presents to the emergency department right-sided flank pain. Is been going on since earlier today she was actually seen at [facility] had a CT scan urinalysis and lab test.  She tells me that since noon she does not feel like she is urinated at all and that she thinks she is not passing the kidney stones. She does have a history of kidney stones requiring stenting in the past.  History of  shunt tells me she has not had a seizure in a couple of days because she is on a new medication which seems to be working. She has history of PCOS but had hysterectomy in 2016. Other past surgical history of appendectomy, hernia repair she reports cholecystectomy. She denies alcohol use. Rates her pain at 9 out of 10. No vomiting no fevers. Denies vaginal discharge or concern for STDs and states she is not sexually active. Nursing Notes were reviewed and I agree.     REVIEW OF SYSTEMS (2-9 systems for level 4, 10 or more for level 5)     Review of Systems   Constitutional:  Negative for fever. Respiratory:  Negative for shortness of breath. Cardiovascular:  Negative for chest pain. Gastrointestinal:  Positive for abdominal pain. Negative for vomiting. Genitourinary:  Positive for decreased urine volume and flank pain. Musculoskeletal:  Positive for back pain. Skin:  Negative for color change, rash and wound. Psychiatric/Behavioral:  Negative for agitation, behavioral problems and confusion. Except as noted above the remainder of the review of systems was reviewed and negative. PAST MEDICAL HISTORY         Diagnosis Date    ADHD (attention deficit hyperactivity disorder) 1988    Depression with anxiety     Diabetes mellitus ([address]     pre-diabetes    Difficult intubation     airway swelled up    Encounter for imaging to screen for metal prior to MRI 06/01/2021    MRI Conditional Med[employer]onic Non-Programmable shunt model#[id number] implanted 10/30/2020 at [facility]. Normal Mode. 1.5T or 3.0T. ESBL (extended spectrum beta-lactamase) producing bacteria infection 11/06/2019    urine    Functional ovarian cysts 2008    rt ovary cyst x 2 yrs.     Headache(784.0)     migraines    History of blood transfusion     at birth    History of kidney stones     History of PCOS     had hysterectomy in 2016    Hydrocephalus [facility])     Hyperlipidemia     Irritable bowel syndrome 2004    had colonoscopy about 6 yrs ago    Meningitis 64/0428    Neutrophilic leukocytosis     Nicotine dependence     PONV (postoperative nausea and vomiting)     very nauseated and sometimes wakes up with a Migraine--happened once after brain surgery    Primary osteoarthritis of left knee 07/01/2016    S/P cone biopsy of cervix 2004    Scoliosis 1990.s    Seizures ([facility] [address]     twice--last one in June2022     (ventriculoperitoneal) shunt status 1982    hydrocephalus f/w Neurosurgeon at [facility]    Wears glasses reading       SURGICAL HISTORY           Procedure Laterality Date    ABDOMEN SURGERY N/A 2021    REMOVAL OF ABDOMINAL WALL MASS performed by Neeraj Ramos MD at [facility]      appendicitis     SECTION  2005    placenta previa    CHOLECYSTECTOMY      COLONOSCOPY      COLPOSCOPY      CSF SHUNT      replaced at age 17    CYSTOSCOPY      stone removal    HEMORRHOID SURGERY      HERNIA REPAIR Bilateral     inguinal    HERNIA REPAIR      hiatal hernia    HYSTERECTOMY, TOTAL ABDOMINAL (CERVIX REMOVED)  2016    TAHBSO, adhesions/PCOS    KNEE ARTHROSCOPY Left 2015    medial meniscectomy, chondroplasty, plica resection    LITHOTRIPSY Bilateral 12/10/2019    OTHER SURGICAL HISTORY  10/19/2016    op lap    VENTRAL HERNIA REPAIR N/A 2022    LAPAROSCOPIC LYSIS OF ADHESIONS AND ABDOMINAL WALL MASS REMOVAL performed by Neeraj Ramos MD at [place] [address]      multiple revisions, most recent        CURRENT MEDICATIONS       Discharge Medication List as of 2022 11:49 PM        CONTINUE these medications which have NOT CHANGED    Details   promethazine (PHENERGAN) 25 MG tablet Take 1 tablet by mouth every 6 hours as needed for Nausea, Disp-12 tablet, R-0Normal      lacosamide (VIMPAT) 50 MG TABS tablet Take 1 tablet by mouth 2 times daily for 15 days. , Disp-30 tablet, R-0Print      levETIRAcetam (KEPPRA) 500 MG tablet Take 2 tablets by mouth 2 times daily Do not take this medication at this time unless told by neurology to restart., Disp-60 tablet, R-3Adjust Sig      diazePAM (VALIUM) 2 MG tablet Take 2 mg by mouth. Historical Med      ondansetron (ZOFRAN ODT) 4 MG disintegrating tablet Take 1 tablet by mouth every 8 hours as needed for Nausea, Disp-20 tablet, R-0Normal      lisdexamfetamine (VYVANSE) 50 MG capsule Take 1 capsule by mouth every morning for 7 days. , Disp-7 capsule, R-0Normal      oxyCODONE (ROXICODONE) 5 MG immediate release tablet Take 5 mg by mouth every 6 hours as needed for Pain. Historical Med      hydrOXYzine HCl (ATARAX) 25 MG tablet Take 25 mg by mouth 3 times daily as needed for ItchingHistorical Med      albuterol sulfate HFA (VENTOLIN HFA) 108 (90 Base) MCG/ACT inhaler Inhale 2 puffs into the lungs 4 times daily as needed for Wheezing, Disp-54 g, R-1Normal      gabapentin (NEURONTIN) 400 MG capsule Take 400 mg by mouth 3 times daily. Historical Med             ALLERGIES     Bee venom, Bentyl [dicyclomine hcl], Dicyclomine, Ketorolac tromethamine, Levofloxacin, Maitake, Shiitake mushroom, Sulfa antibiotics, Vancomycin, Zosyn [piperacillin sod-tazobactam so], Adhesive tape, Oxycodone-acetaminophen, Sulfacetamide, Zofran [ondansetron], Acetaminophen, Butalbital-apap-caff-cod, Ceftaroline, Daptomycin, Fosfomycin tromethamine, Haldol [haloperidol], Keppra [levetiracetam], Ketamine, Methocarbamol, Morphine, Nitrofurantoin, Prochlorperazine, Reglan [metoclopramide], Silicone, and Tazobactam    FAMILY HISTORY           Problem Relation Age of Onset    High Blood Pressure Mother     Diabetes Mother     High Cholesterol Mother     Depression Mother     Diabetes Maternal Grandmother     High Blood Pressure Maternal Grandmother     High Cholesterol Maternal Grandmother     Heart Disease Maternal Grandfather     High Blood Pressure Maternal Grandfather     Heart Disease Paternal Grandmother     Stroke Paternal Grandfather     Depression Sister     Cirrhosis Father     Rheum Arthritis Neg Hx     Osteoarthritis Neg Hx     Asthma Neg Hx     Breast Cancer Neg Hx     Cancer Neg Hx     Heart Failure Neg Hx     Hypertension Neg Hx     Migraines Neg Hx     Ovarian Cancer Neg Hx     Rashes/Skin Problems Neg Hx     Seizures Neg Hx     Thyroid Disease Neg Hx      Family Status   Relation Name Status    Mother  Alive    MGM  Alive        copd, goiter    MGF      PGM  Alive        alcoholic    PGF  Alive    Sister  Alive scoliosis    Father   at age 50        cirhosis liver    Sister  Alive        scoliosis    Brother  Alive        adhd, mild autism    Neg Hx  (Not Specified)        SOCIAL HISTORY      reports that she has been smoking cigarettes. She has a 9.00 pack-year smoking history. She has never used smokeless tobacco. She reports that she does not drink alcohol and does not use drugs. PHYSICAL EXAM    (up to 7 for level 4, 8 or more for level 5)     ED Triage Vitals [22 2105]   BP Temp Temp Source Heart Rate Resp SpO2 Height Weight   (!) 139/107 97.9 °F (36.6 °C) Oral 87 23 100 % 4' 11\" (1.499 m) 159 lb 13.3 oz (72.5 kg)       Physical Exam  Vitals and nursing note reviewed. Constitutional:       Appearance: Normal appearance. HENT:      Head: Normocephalic and atraumatic. Mouth/Throat:      Mouth: Mucous membranes are moist.   Eyes:      Pupils: Pupils are equal, round, and reactive to light. Cardiovascular:      Rate and Rhythm: Normal rate. Pulmonary:      Effort: Pulmonary effort is normal. No respiratory distress. Abdominal:      Tenderness: There is abdominal tenderness. There is right CVA tenderness. There is no guarding or rebound. Musculoskeletal:         General: No swelling. Normal range of motion. Cervical back: Normal range of motion. Skin:     General: Skin is warm. Neurological:      General: No focal deficit present. Mental Status: She is alert and oriented to person, place, and time.    Psychiatric:         Mood and Affect: Mood normal.         Behavior: Behavior normal.       DIAGNOSTIC RESULTS     NONE    LABS:  Labs Reviewed   CBC WITH AUTO DIFFERENTIAL - Abnormal; Notable for the following components:       Result Value    WBC 11.3 (*)     RBC 5.31 (*)     RDW 16.6 (*)     All other components within normal limits   COMPREHENSIVE METABOLIC PANEL   LIPASE   URINALYSIS WITH REFLEX TO CULTURE       All other labs were within normal range or not returned as of this dictation. EMERGENCY DEPARTMENT COURSE and DIFFERENTIAL DIAGNOSIS/MDM:   Vitals:    Vitals:    12/16/22 2105 12/16/22 2230 12/16/22 2315 12/16/22 2347   BP: (!) 139/107 115/82  125/82   Pulse: 87 87 78 81   Resp: 23 19 18 18   Temp: 97.9 °F (36.6 °C)   97.8 °F (36.6 °C)   TempSrc: Oral   Oral   SpO2: 100% 97%  99%   Weight: 159 lb 13.3 oz (72.5 kg)      Height: 4' 11\" (1.499 m)        I discussed with Petrona Warnertara Campos and/or family the exam results, diagnosis, care, prognosis, reasons to return and the importance of follow up. Patient and/or family is in full agreement with plan and all questions have been answered. Specific discharge instructions explained, including reasons to return to the emergency department. Dimitri Isaacs is well appearing, non-toxic, and afebrile at the time of discharge. Afebrile not tachycardic not hypoxic. Having right flank pain feels like prior kidney stones. She is in urinary retention she tells me that this is not abnormal when she gets a kidney stone and that she has had Guzman catheters in the past.  She is discharged with a Guzman catheter after bladder scan revealed 800 cc of urine. She was given a dose of pain medication here in the ER and tells me that she has Percocet at home. Her urine does not appear infected and her lab work is also reassuring. Discharged with instructions to follow-up with urology and primary care and return for new, worsening or other concerns. I estimate there is LOW risk for ACUTE APPENDICITIS, BOWEL OBSTRUCTION, CHOLECYSTITIS, DIVERTICULITIS, INCARCERATED HERNIA, PANCREATITIS, PELVIC INFLAMMATORY DISEASE, OVARIAN TORSION, PERFORATED BOWEL,  BOWEL ISCHEMIA, CARDIAC ISCHEMIA, ECTOPIC PREGNANCY, or TUBO-OVARIAN ABSCESS, thus I consider the discharge disposition reasonable. Also, there is no evidence or peritonitis, sepsis, or toxicity. CONSULTS:  None    PROCEDURES:  Procedures      FINAL IMPRESSION      1. Retention of urine    2. History of kidney stones          DISPOSITION/PLAN   DISPOSITION Decision To Discharge 12/16/2022 11:42:09 PM      PATIENT REFERRED TO:  Gennyalyssa Tesfaye, [address]  [address] [facility]  [phone]    Call   For follow up appointment, As needed    Angeles Vang MD  [address] [facility]. [address]  [phone]    Call   For follow up appointment with urology    DISCHARGE MEDICATIONS:  Discharge Medication List as of 12/16/2022 11:49 PM        START taking these medications    Details   tamsulosin (FLOMAX) 0.4 MG capsule Take 1 capsule by mouth daily for 5 days, Disp-5 capsule, R-0Normal             (Please note that portions of this note were completed with a voice recognition program.  Efforts were made to edit the dictations but occasionally words are mis-transcribed.)    Mel Roper, [address]  12/17/22 0140

## 2022-12-17 NOTE — ED PROVIDER NOTES
Date of evaluation: 12/16/2022    ED Attending Attestation Note     CHIEF COMPLAINT     Kidney stones  HISTORY OF PRESENT ILLNESS  (Location/Symptom, Timing/Onset,Context/Setting, Quality, Duration, Modifying Factors, Severity). Note limiting factors. This patient was seen by the advance practice provider. I have seen and examined the patient, agree with the workup, evaluation, management and diagnosis. The care plan has been discussed. Chief Complaint   Patient presents with    Nephrolithiasis     Pt states she has kidney stones, unable to urinate. Pt states she went to hospital earlier and didn't get anything done. Madi Lilly is a 38 y.o. female who presents to the emergency department secondary to concern for continued pain from kidney stones. She was seen this morning at outside hospital where she had a workup including CT of her abdomen WO contrast which showed intrarenal stone, no acute passage of stone. Urine without signs of overt infection there. She states she is in pain and needs morphine. Past medical history noted below:    has a past medical history of ADHD (attention deficit hyperactivity disorder), Depression with anxiety, Diabetes mellitus ([address] Difficult intubation, Encounter for imaging to screen for metal prior to MRI, ESBL (extended spectrum beta-lactamase) producing bacteria infection, Functional ovarian cysts, Headache(784.0), History of blood transfusion, History of kidney stones, History of PCOS, Hydrocephalus ([address] Hyperlipidemia, Irritable bowel syndrome, Meningitis, Neutrophilic leukocytosis, Nicotine dependence, PONV (postoperative nausea and vomiting), Primary osteoarthritis of left knee, S/P cone biopsy of cervix, Scoliosis, Seizures ([address]  (ventriculoperitoneal) shunt status, and Wears glasses.    Social History     Socioeconomic History    Marital status: Single     Spouse name: None    Number of children: None    Years of education: None    Highest education level: None   Occupational History    Occupation: disability, SSI    Tobacco Use    Smoking status: Every Day     Packs/day: 0.50     Years: 18.00     Pack years: 9.00     Types: Cigarettes    Smokeless tobacco: Never   Vaping Use    Vaping Use: Never used   Substance and Sexual Activity    Alcohol use: No     Alcohol/week: 0.0 standard drinks    Drug use: Never    Sexual activity: Not Currently     Partners: Male     Aside from what is stated above denies any other symptoms or modifying factors. Nursing Notes reviewed.     Past Surgical History:   Procedure Laterality Date    ABDOMEN SURGERY N/A 2021    REMOVAL OF ABDOMINAL WALL MASS performed by Anita Chowdhury MD at [facility]      appendicitis     SECTION  2005    placenta previa    CHOLECYSTECTOMY      COLONOSCOPY  2004    COLPOSCOPY      CSF SHUNT      replaced at age 17    CYSTOSCOPY      stone removal    HEMORRHOID SURGERY      HERNIA REPAIR Bilateral     inguinal    HERNIA REPAIR      hiatal hernia    HYSTERECTOMY, TOTAL ABDOMINAL (CERVIX REMOVED)  2016    TAHBSO, adhesions/PCOS    KNEE ARTHROSCOPY Left 2015    medial meniscectomy, chondroplasty, plica resection    LITHOTRIPSY Bilateral 12/10/2019    OTHER SURGICAL HISTORY  10/19/2016    op lap    VENTRAL HERNIA REPAIR N/A 2022    LAPAROSCOPIC LYSIS OF ADHESIONS AND ABDOMINAL WALL MASS REMOVAL performed by An[facility] MD at [facility]      multiple revisions, most recent      Family History   Problem Relation Age of Onset    High Blood Pressure Mother     Diabetes Mother     High Cholesterol Mother     Depression Mother     Diabetes Maternal Grandmother     High Blood Pressure Maternal Grandmother     High Cholesterol Maternal Grandmother     Heart Disease Maternal Grandfather     High Blood Pressure Maternal Grandfather     Heart Disease Paternal Grandmother     Stroke Paternal Grandfather Depression Sister     Cirrhosis Father     Rheum Arthritis Neg Hx     Osteoarthritis Neg Hx     Asthma Neg Hx     Breast Cancer Neg Hx     Cancer Neg Hx     Heart Failure Neg Hx     Hypertension Neg Hx     Migraines Neg Hx     Ovarian Cancer Neg Hx     Rashes/Skin Problems Neg Hx     Seizures Neg Hx     Thyroid Disease Neg Hx        CURRENT MEDICATIONS       Previous Medications    ALBUTEROL SULFATE HFA (VENTOLIN HFA) 108 (90 BASE) MCG/ACT INHALER    Inhale 2 puffs into the lungs 4 times daily as needed for Wheezing    DIAZEPAM (VALIUM) 2 MG TABLET    Take 2 mg by mouth. GABAPENTIN (NEURONTIN) 400 MG CAPSULE    Take 400 mg by mouth 3 times daily. HYDROXYZINE HCL (ATARAX) 25 MG TABLET    Take 25 mg by mouth 3 times daily as needed for Itching    LACOSAMIDE (VIMPAT) 50 MG TABS TABLET    Take 1 tablet by mouth 2 times daily for 15 days. LEVETIRACETAM (KEPPRA) 500 MG TABLET    Take 2 tablets by mouth 2 times daily Do not take this medication at this time unless told by neurology to restart. LISDEXAMFETAMINE (VYVANSE) 50 MG CAPSULE    Take 1 capsule by mouth every morning for 7 days. ONDANSETRON (ZOFRAN ODT) 4 MG DISINTEGRATING TABLET    Take 1 tablet by mouth every 8 hours as needed for Nausea    OXYCODONE (ROXICODONE) 5 MG IMMEDIATE RELEASE TABLET    Take 5 mg by mouth every 6 hours as needed for Pain.     PROMETHAZINE (PHENERGAN) 25 MG TABLET    Take 1 tablet by mouth every 6 hours as needed for Nausea      DIAGNOSTIC RESULTS   RADIOLOGY:   Interpretation per Radiologist below, if available at the time of this note:  No orders to display     Patient was given the following medications:  Orders Placed This Encounter   Medications    0.9 % sodium chloride bolus    morphine (PF) injection 4 mg    promethazine (PHENERGAN) 12.5mg in sodium chloride 0.9% 50 mL IVPB 12.5 mg    lidocaine (cardiac) (XYLOCAINE) 100 mg in sodium chloride 0.9 % 100 mL IVPB    diphenhydrAMINE (BENADRYL) tablet 25 mg    tamsulosin (FLOMAX) 0.4 MG capsule     Sig: Take 1 capsule by mouth daily for 5 days     Dispense:  5 capsule     Refill:  0       INITIAL VITALS: BP: (!) 139/107, Temp: 97.9 °F (36.6 °C), Heart Rate: 87, Resp: 23, SpO2: 100 %   RECENT VITALS:  BP: 115/82,Temp: 97.9 °F (36.6 °C), Heart Rate: 78, Resp: 18, SpO2: 97 %     I personally saw the patient and performed a substantive portion of the visit including all aspects of the medical decision making. Is this patient to be included in the SEP-1 Core Measure due to severe sepsis or septic shock? No   Exclusion criteria - the patient is NOT to be included for SEP-1 Core Measure due to:  2+ SIRS criteria are not met      My assessment reveals a well-appearing female who is sitting up in bed. She reports her symptoms have improved since arrival.  She states that she still has some burning with urination. She reports that normally this improves with Percocet but today it did not. She states also she feels like she cannot urinate and here she did have urinary retention noted. She states this has happened to her before and she has had a Guzman in the past and she follows with urology at [facility].  Her physical exam overall is reassuring, no peripheral edema, no flank tenderness palpation, she has a question appropriately and clean sentences, no increased work of breathing. Review of medical record shows that in this year alone she has had 19 CT scans of her abdomen and pelvis. This does not include a CT scan of her chest abdomen pelvis ordered to rule out PE or the CT scans of her head and cervical spine. I did discuss with her the increased risk this poses for cancer and recommendations for not doing any further CT scan today which she was in agreement with. On reassessment repeat vitals remained hemodynamically stable, had improved from arrival.  Discussed results of labs and urine.   She did ask if she could have another dose of pain medicine prior to discharge and we discussed her narcotic pain medications were a poor option given this increases risk for urinary retention, she was amenable to getting lidocaine for pain. She also requested some Benadryl as she stated the morphine she had earlier made her itchy, she received 25 mg of p.o. Benadryl. We discussed care of Guzman for urinary retention, she states she has had them in the past.  She did asked a prescription for Flomax which she has also had in the past, this was prescribed. Discussed follow-up and return cautions. She expressed understanding of all instructions, was in agreement with plan, and was discharged home in stable condition. FINAL IMPRESSION      1. Retention of urine    2. History of kidney stones        DISPOSITION/PLAN   DISPOSITION Decision To Discharge 12/16/2022 11:42:09 PM      PATIENT REFERRED TO:  Allie Krishna, [address] [place] [address]  [address] [place] [address]  [phone]    Call   For follow up appointment, As needed    Guillermo Santana MD  [address] [facility]. [address]  [phone]    Call   For follow up appointment with urology    DISCHARGE MEDICATIONS:  New Prescriptions    TAMSULOSIN (FLOMAX) 0.4 MG CAPSULE    Take 1 capsule by mouth daily for 5 days            (Please note that portions of this note were completed with a voice recognition program. Efforts were made to edit the dictations but occasionally words are mis-transcribed.)    Kim Valdovinos MD (electronically signed)  Attending Emergency Physician       Kim Valdovinos MD  12/16/22 0070

## 2022-12-17 NOTE — PROGRESS NOTES
Patient called on-call provider, stating she was evaluated at the ED for abdominal pain earlier today. Patient stated MD told her there is nothing wrong with her and was sent home. patient is reporting continued, but severe abdominal pain with distention nausea reporting not sure what to do. Reports took two Percocets and its not touching the pain. Advised patient to monitor symptoms based on ED findings earlier today, however patient  feels she has all red flags and  request a note be placed in the chart, as she would like to go to [facility] for further evaluation and treatment.

## 2022-12-17 NOTE — ED NOTES
Pt tried to urinate and was unable to void. Pt was bladder scanned and it showed 814ml. Pt has agreed to silva catheter to help void.      Hayden Man RN  12/16/22 1462

## 2022-12-18 ENCOUNTER — HOSPITAL ENCOUNTER (EMERGENCY)
Age: 42
Discharge: HOME | End: 2022-12-18
Attending: EMERGENCY MEDICINE
Payer: COMMERCIAL

## 2022-12-18 VITALS
DIASTOLIC BLOOD PRESSURE: 90 MMHG | BODY MASS INDEX: 31.64 KG/M2 | WEIGHT: 156.97 LBS | HEART RATE: 92 BPM | OXYGEN SATURATION: 99 % | SYSTOLIC BLOOD PRESSURE: 142 MMHG | TEMPERATURE: 98.4 F | HEIGHT: 59 IN | RESPIRATION RATE: 17 BRPM

## 2022-12-18 DIAGNOSIS — Z87.442 HISTORY OF KIDNEY STONES: ICD-10-CM

## 2022-12-18 DIAGNOSIS — Z87.898 HISTORY OF SEIZURES: ICD-10-CM

## 2022-12-18 DIAGNOSIS — T83.9XXA PROBLEM WITH FOLEY CATHETER, INITIAL ENCOUNTER (HCC): Primary | ICD-10-CM

## 2022-12-18 PROCEDURE — 6370000000 HC RX 637 (ALT 250 FOR IP): Performed by: EMERGENCY MEDICINE

## 2022-12-18 PROCEDURE — 99283 EMERGENCY DEPT VISIT LOW MDM: CPT

## 2022-12-18 RX ORDER — PROMETHAZINE HYDROCHLORIDE 25 MG/1
25 TABLET ORAL ONCE
Status: COMPLETED | OUTPATIENT
Start: 2022-12-18 | End: 2022-12-18

## 2022-12-18 RX ADMIN — PROMETHAZINE HYDROCHLORIDE 25 MG: 25 TABLET ORAL at 23:39

## 2022-12-18 ASSESSMENT — PAIN SCALES - GENERAL: PAINLEVEL_OUTOF10: 9

## 2022-12-18 ASSESSMENT — PAIN - FUNCTIONAL ASSESSMENT: PAIN_FUNCTIONAL_ASSESSMENT: 0-10

## 2022-12-19 NOTE — DISCHARGE INSTRUCTIONS
We offered to exchange your Guzman catheter today which you did not want to do. We also offered to a shot for nausea medication which you did not want to do. You did receive a pill for nausea medication and hopefully this will help so when you get home you can take your pain medication. The most important next step will be calling urology for follow-up and keeping your appointment with neurology this week. Return to emergency department for any new or worsening symptoms or concerns.

## 2022-12-19 NOTE — ED NOTES
D/C: Order noted for d/c. Pt confirmed d/c paperwork  have correct name. Discharge and education instructions reviewed with patient. Teach-back successful. Pt verbalized understanding and signed d/c papers. Pt denied questions at this time. No acute distress noted. Patient instructed to follow-up as noted - return to emergency department if symptoms worsen. Patient verbalized understanding. Discharged per EDMD with discharge instructions. Pt discharged to private vehicle. Patient stable upon departure. Thanked patient for choosing [facility] for care.           Adis Meeks RN  12/18/22 5541

## 2022-12-19 NOTE — ED PROVIDER NOTES
[address]      Pt Name: Amalia German  MRN: [id number]  [place] 1980  Date of evaluation: 12/18/2022  Provider: Lynsey Borges MD    CHIEF COMPLAINT     I am not peeing  HISTORY OF PRESENT ILLNESS  (Location/Symptom, Timing/Onset,Context/Setting, Quality, Duration, Modifying Factors, Severity). Note limiting factors. Chief Complaint   Patient presents with    Other     Pt has urinary catheter in place due to kidney stones. Pt states that she drained the catheter this morning but has not had any output since       Amalia German is a 38 y.o. female who presents to the emergency department secondary to concern for not urinating. She states that it has been going on since this morning. She states however she is passing kidney stones. When asked to clarify how she is passing stones but not being she then discusses having had a seizure this morning and having a headache on the top of her head. She also endorses having nausea and vomiting and not being able to keep down her pain medicine at home. She was seen by me for similar symptoms recently, she states she has not yet called her urologist because she first needs to see her neurologist this week. Past medical history noted below. Currently smoking. Aside from what is stated above denies any other symptoms or modifying factors. Nursing Notes reviewed. REVIEW OF SYSTEMS  (2-9 systems for level 4, 10 or more for level 5)   Review of Systems Pertinent positive and negative findings as documented in the HPI; otherwise all other systems were reviewed and were negative.    PAST MEDICAL HISTORY     Past Medical History:   Diagnosis Date    ADHD (attention deficit hyperactivity disorder) 1988    Depression with anxiety     Diabetes mellitus ([facility] [address]     pre-diabetes    Difficult intubation     airway swelled up    Encounter for imaging to screen for metal prior to MRI 06/01/2021    MRI Conditional Medtronic Non-Programmable shunt model#13071 implanted 10/30/2020 at [facility]. Normal Mode. 1.5T or 3.0T. ESBL (extended spectrum beta-lactamase) producing bacteria infection 2019    urine    Functional ovarian cysts 2008    rt ovary cyst x 2 yrs.     Headache(784.0)     migraines    History of blood transfusion     at birth    History of kidney stones     History of PCOS     had hysterectomy in 2016    Hydrocephalus ([address]     Hyperlipidemia     Irritable bowel syndrome 2004    had colonoscopy about 6 yrs ago    Meningitis     Neutrophilic leukocytosis     Nicotine dependence     PONV (postoperative nausea and vomiting)     very nauseated and sometimes wakes up with a Migraine--happened once after brain surgery    Primary osteoarthritis of left knee 2016    S/P cone biopsy of cervix     Scoliosis .s    Seizures ([address]     twice--last one in 2022     (ventriculoperitoneal) shunt status     hydrocephalus f/w Neurosurgeon at [facility]    Wears glasses     reading       SURGICALHISTORY       Past Surgical History:   Procedure Laterality Date    ABDOMEN SURGERY N/A 2021    REMOVAL OF ABDOMINAL WALL MASS performed by Renée Toth MD at [facility]      appendicitis     SECTION  2005    placenta previa    CHOLECYSTECTOMY      COLONOSCOPY  2004    COLPOSCOPY      CSF SHUNT      replaced at age 17    CYSTOSCOPY      stone removal    HEMORRHOID SURGERY      HERNIA REPAIR Bilateral     inguinal    HERNIA REPAIR      hiatal hernia    HYSTERECTOMY, TOTAL ABDOMINAL (CERVIX REMOVED)  2016    TAHBSO, adhesions/PCOS    KNEE ARTHROSCOPY Left 2015    medial meniscectomy, chondroplasty, plica resection    LITHOTRIPSY Bilateral 12/10/2019    OTHER SURGICAL HISTORY  10/19/2016    op lap    VENTRAL HERNIA REPAIR N/A 2022    LAPAROSCOPIC LYSIS OF ADHESIONS AND ABDOMINAL WALL MASS REMOVAL performed by Stacey Michele Nicolás Sandoval MD at [place] [address]      multiple revisions, most recent 2015     CURRENT MEDICATIONS       Previous Medications    ALBUTEROL SULFATE HFA (VENTOLIN HFA) 108 (90 BASE) MCG/ACT INHALER    Inhale 2 puffs into the lungs 4 times daily as needed for Wheezing    DIAZEPAM (VALIUM) 2 MG TABLET    Take 2 mg by mouth. GABAPENTIN (NEURONTIN) 400 MG CAPSULE    Take 400 mg by mouth 3 times daily. HYDROXYZINE HCL (ATARAX) 25 MG TABLET    Take 25 mg by mouth 3 times daily as needed for Itching    LACOSAMIDE (VIMPAT) 50 MG TABS TABLET    Take 1 tablet by mouth 2 times daily for 15 days. LEVETIRACETAM (KEPPRA) 500 MG TABLET    Take 2 tablets by mouth 2 times daily Do not take this medication at this time unless told by neurology to restart. LISDEXAMFETAMINE (VYVANSE) 50 MG CAPSULE    Take 1 capsule by mouth every morning for 7 days. ONDANSETRON (ZOFRAN ODT) 4 MG DISINTEGRATING TABLET    Take 1 tablet by mouth every 8 hours as needed for Nausea    OXYCODONE (ROXICODONE) 5 MG IMMEDIATE RELEASE TABLET    Take 5 mg by mouth every 6 hours as needed for Pain.     PROMETHAZINE (PHENERGAN) 25 MG TABLET    Take 1 tablet by mouth every 6 hours as needed for Nausea    TAMSULOSIN (FLOMAX) 0.4 MG CAPSULE    Take 1 capsule by mouth daily for 5 days      ALLERGIES     Bee venom, Bentyl [dicyclomine hcl], Dicyclomine, Ketorolac tromethamine, Levofloxacin, Maitake, Shiitake mushroom, Sulfa antibiotics, Vancomycin, Zosyn [piperacillin sod-tazobactam so], Adhesive tape, Oxycodone-acetaminophen, Sulfacetamide, Zofran [ondansetron], Acetaminophen, Butalbital-apap-caff-cod, Ceftaroline, Daptomycin, Fosfomycin tromethamine, Haldol [haloperidol], Keppra [levetiracetam], Ketamine, Methocarbamol, Morphine, Nitrofurantoin, Prochlorperazine, Reglan [metoclopramide], Silicone, and Tazobactam  FAMILY HISTORY       Family History   Problem Relation Age of Onset    High Blood Pressure Mother     Diabetes Mother High Cholesterol Mother     Depression Mother     Diabetes Maternal Grandmother     High Blood Pressure Maternal Grandmother     High Cholesterol Maternal Grandmother     Heart Disease Maternal Grandfather     High Blood Pressure Maternal Grandfather     Heart Disease Paternal Grandmother     Stroke Paternal Grandfather     Depression Sister     Cirrhosis Father     Rheum Arthritis Neg Hx     Osteoarthritis Neg Hx     Asthma Neg Hx     Breast Cancer Neg Hx     Cancer Neg Hx     Heart Failure Neg Hx     Hypertension Neg Hx     Migraines Neg Hx     Ovarian Cancer Neg Hx     Rashes/Skin Problems Neg Hx     Seizures Neg Hx     Thyroid Disease Neg Hx      SOCIAL HISTORY       Social History     Socioeconomic History    Marital status: Single     Spouse name: None    Number of children: None    Years of education: None    Highest education level: None   Occupational History    Occupation: disability, SSI    Tobacco Use    Smoking status: Every Day     Packs/day: 0.50     Years: 18.00     Pack years: 9.00     Types: Cigarettes    Smokeless tobacco: Never   Vaping Use    Vaping Use: Never used   Substance and Sexual Activity    Alcohol use: No     Alcohol/week: 0.0 standard drinks    Drug use: Never    Sexual activity: Not Currently     Partners: Male     SCREENINGS    Adne Coma Scale  Eye Opening: Spontaneous  Best Verbal Response: Oriented  Best Motor Response: Obeys commands  [place] Coma Scale Score: 15    PHYSICAL EXAM  (up to 7 for level 4, 8 or more for level 5)   INITIAL VITALS: BP: (!) 142/90, Temp: 98.4 °F (36.9 °C), Heart Rate: (!) 108, Resp: 17, SpO2: 99 %   Physical Exam  Vitals and nursing note reviewed. Constitutional:       Appearance: She is not toxic-appearing or diaphoretic. HENT:      Head: Normocephalic and atraumatic. Right Ear: External ear normal.      Left Ear: External ear normal.   Eyes:      General: No scleral icterus. Right eye: No discharge. Left eye: No discharge. Conjunctiva/sclera: Conjunctivae normal.   Neck:      Trachea: No tracheal deviation. Cardiovascular:      Rate and Rhythm: Normal rate. Comments: Rate 90s in the room awake, alert, oriented. Vitals in triage awake, alert, oriented. Vitals in triage are notable for elevated heart rate which is common for her I discussed with her I discussed with her  Pulmonary:      Effort: Pulmonary effort is normal. No respiratory distress. Musculoskeletal:      Cervical back: No rigidity. Skin:     General: Skin is dry. Neurological:      General: No focal deficit present. Mental Status: She is alert and oriented to person, place, and time. Gait: Gait normal.     DIAGNOSTIC RESULTS   RADIOLOGY:   Interpretation per Radiologist below, if available at the time of this note:  No orders to display     LABS:  Labs Reviewed - No data to display    [address] [place] [address] and DIFFERENTIAL DIAGNOSIS/MDM:   Patient was given the following medications:  Orders Placed This Encounter   Medications    promethazine (PHENERGAN) tablet 25 mg     CONSULTS:  None    INITIAL VITALS: BP: (!) 142/90, Temp: 98.4 °F (36.9 °C), Heart Rate: (!) 108, Resp: 17, SpO2: 99 %   RECENT VITALS:  BP: (!) 142/90,Temp: 98.4 °F (36.9 °C), Heart Rate: (!) 108, Resp: 17, SpO2: 99 %     Is this patient to be included in the SEP-1 Core Measure due to severe sepsis or septic shock? No   Exclusion criteria - the patient is NOT to be included for SEP-1 Core Measure due to:  2+ SIRS criteria are not met    Alberto Montes is a 38 y.o. female who presents to the emergency department secondary to concern for symptoms as noted in HPI. On arrival she is awake, alert, oriented. Vitals in triage notable for mildly elevated heart rate though in the room her heart rate is in the 90s. Physical exam reassuring as noted above. Discussed she has pain medication at home so nausea medication here would be given to aid her in taking medicine at home. Discussed she would not be receiving any narcotics here. I discussed with her changing out the silva and nausea medication and she did not want the silva exchanged and did not want an IM shot for nausea. Discussed how with her vomiting it would behoove us to do IM or AR but she preferred a pill. After discussion she stated she was ready to go home. Vitals remained HDS. Discussed keeping her appointment with neurology this week and calling to schedule with urology. She was discharged home in stable condition. FINAL IMPRESSION      1. Problem with Silva catheter, [facility] ([facility]    2. History of kidney stones    3. History of seizures        DISPOSITION/PLAN   DISPOSITION Decision To Discharge 12/18/2022 11:35:11 PM      PATIENT REFERRED TO:  Selma Ruby, [facility]  [address] [place] [address]  [phone]    Call   For follow up appointment    Chandan Paz MD  [address] [facility]. [address]  [phone]    Call   For follow up appointment with urology    DISCHARGE MEDICATIONS:  New Prescriptions    No medications on file            (Please note that portions of this note were completed with a voice recognition program. Efforts were made to edit the dictations but occasionally words are mis-transcribed.)    Saba Salinas MD (electronically signed)  Attending Emergency Physician     Saba Salinas MD  12/18/22 [id number]

## 2022-12-22 ENCOUNTER — TELEPHONE (OUTPATIENT)
Dept: PRIMARY CARE CLINIC | Age: 42
End: 2022-12-22

## 2022-12-22 NOTE — TELEPHONE ENCOUNTER
Pt called and left vm starting that she pulled her catheder out last night and there was clogg in it from a blood clot. Stated that she is still not urinating. Stated she called her urologist and they cant get her in till January 3rd. Called pt and advised that there is nothing that we can do that she need to go to the er if she is not urinating. Made provider Lupis milligan aware.  Hs

## 2022-12-24 ENCOUNTER — HOSPITAL ENCOUNTER (EMERGENCY)
Age: 42
Discharge: HOME | End: 2022-12-25
Attending: EMERGENCY MEDICINE
Payer: COMMERCIAL

## 2022-12-24 DIAGNOSIS — R33.9 URINARY RETENTION: Primary | ICD-10-CM

## 2022-12-24 DIAGNOSIS — R10.9 BILATERAL FLANK PAIN: ICD-10-CM

## 2022-12-24 LAB
A/G RATIO: 1.4 (ref 1.1–2.2)
ALBUMIN SERPL-MCNC: 4.7 G/DL (ref 3.4–5)
ALP BLD-CCNC: 143 U/L (ref 40–129)
ALT SERPL-CCNC: 20 U/L (ref 10–40)
ANION GAP SERPL CALCULATED.3IONS-SCNC: 17 MMOL/L (ref 3–16)
AST SERPL-CCNC: 12 U/L (ref 15–37)
BASOPHILS ABSOLUTE: 0.1 K/UL (ref 0–0.2)
BASOPHILS RELATIVE PERCENT: 0.7 %
BILIRUB SERPL-MCNC: <0.2 MG/DL (ref 0–1)
BILIRUBIN URINE: NEGATIVE
BLOOD, URINE: NEGATIVE
BUN BLDV-MCNC: 7 MG/DL (ref 7–20)
CALCIUM SERPL-MCNC: 10.2 MG/DL (ref 8.3–10.6)
CHLORIDE BLD-SCNC: 99 MMOL/L (ref 99–110)
CLARITY: CLEAR
CO2: 24 MMOL/L (ref 21–32)
COLOR: YELLOW
CREAT SERPL-MCNC: 0.6 MG/DL (ref 0.6–1.1)
EOSINOPHILS ABSOLUTE: 0.1 K/UL (ref 0–0.6)
EOSINOPHILS RELATIVE PERCENT: 0.9 %
GFR SERPL CREATININE-BSD FRML MDRD: >60 ML/MIN/{1.73_M2}
GLUCOSE BLD-MCNC: 117 MG/DL (ref 70–99)
GLUCOSE URINE: NEGATIVE MG/DL
HCT VFR BLD CALC: 46.2 % (ref 36–48)
HEMOGLOBIN: 15.3 G/DL (ref 12–16)
KETONES, URINE: NEGATIVE MG/DL
LACTIC ACID: 1.9 MMOL/L (ref 0.4–2)
LEUKOCYTE ESTERASE, URINE: NEGATIVE
LIPASE: 12 U/L (ref 13–60)
LYMPHOCYTES ABSOLUTE: 2.1 K/UL (ref 1–5.1)
LYMPHOCYTES RELATIVE PERCENT: 16.2 %
MAGNESIUM: 2.2 MG/DL (ref 1.8–2.4)
MCH RBC QN AUTO: 28.4 PG (ref 26–34)
MCHC RBC AUTO-ENTMCNC: 33.2 G/DL (ref 31–36)
MCV RBC AUTO: 85.7 FL (ref 80–100)
MICROSCOPIC EXAMINATION: NORMAL
MONOCYTES ABSOLUTE: 0.4 K/UL (ref 0–1.3)
MONOCYTES RELATIVE PERCENT: 3.3 %
NEUTROPHILS ABSOLUTE: 10.4 K/UL (ref 1.7–7.7)
NEUTROPHILS RELATIVE PERCENT: 78.9 %
NITRITE, URINE: NEGATIVE
PDW BLD-RTO: 15.7 % (ref 12.4–15.4)
PH UA: 7 (ref 5–8)
PLATELET # BLD: 311 K/UL (ref 135–450)
PMV BLD AUTO: 8.2 FL (ref 5–10.5)
POTASSIUM REFLEX MAGNESIUM: 3.5 MMOL/L (ref 3.5–5.1)
PROTEIN UA: NEGATIVE MG/DL
RBC # BLD: 5.4 M/UL (ref 4–5.2)
SODIUM BLD-SCNC: 140 MMOL/L (ref 136–145)
SPECIFIC GRAVITY UA: 1 (ref 1–1.03)
TOTAL PROTEIN: 8 G/DL (ref 6.4–8.2)
URINE REFLEX TO CULTURE: NORMAL
URINE TYPE: NORMAL
UROBILINOGEN, URINE: 0.2 E.U./DL
WBC # BLD: 13.2 K/UL (ref 4–11)

## 2022-12-24 PROCEDURE — 83605 ASSAY OF LACTIC ACID: CPT

## 2022-12-24 PROCEDURE — 81003 URINALYSIS AUTO W/O SCOPE: CPT

## 2022-12-24 PROCEDURE — 99284 EMERGENCY DEPT VISIT MOD MDM: CPT

## 2022-12-24 PROCEDURE — 80053 COMPREHEN METABOLIC PANEL: CPT

## 2022-12-24 PROCEDURE — 85025 COMPLETE CBC W/AUTO DIFF WBC: CPT

## 2022-12-24 PROCEDURE — 51798 US URINE CAPACITY MEASURE: CPT

## 2022-12-24 PROCEDURE — 36415 COLL VENOUS BLD VENIPUNCTURE: CPT

## 2022-12-24 PROCEDURE — 83735 ASSAY OF MAGNESIUM: CPT

## 2022-12-24 PROCEDURE — 83690 ASSAY OF LIPASE: CPT

## 2022-12-24 ASSESSMENT — ENCOUNTER SYMPTOMS
DIARRHEA: 0
SHORTNESS OF BREATH: 0
NAUSEA: 1
ABDOMINAL PAIN: 1
SORE THROAT: 0
VOMITING: 0
WHEEZING: 0
CONSTIPATION: 0
CHEST TIGHTNESS: 0

## 2022-12-24 ASSESSMENT — PAIN DESCRIPTION - PAIN TYPE: TYPE: ACUTE PAIN

## 2022-12-24 ASSESSMENT — PAIN DESCRIPTION - FREQUENCY: FREQUENCY: CONTINUOUS

## 2022-12-24 ASSESSMENT — PAIN DESCRIPTION - DESCRIPTORS: DESCRIPTORS: ACHING

## 2022-12-24 ASSESSMENT — PAIN DESCRIPTION - ONSET: ONSET: ON-GOING

## 2022-12-24 ASSESSMENT — PAIN DESCRIPTION - LOCATION: LOCATION: FLANK

## 2022-12-24 ASSESSMENT — PAIN DESCRIPTION - ORIENTATION: ORIENTATION: RIGHT;LOWER

## 2022-12-24 ASSESSMENT — PAIN SCALES - GENERAL: PAINLEVEL_OUTOF10: 10

## 2022-12-25 VITALS
DIASTOLIC BLOOD PRESSURE: 75 MMHG | HEART RATE: 85 BPM | OXYGEN SATURATION: 98 % | RESPIRATION RATE: 23 BRPM | SYSTOLIC BLOOD PRESSURE: 136 MMHG | TEMPERATURE: 98.8 F

## 2022-12-25 PROCEDURE — 6360000002 HC RX W HCPCS: Performed by: EMERGENCY MEDICINE

## 2022-12-25 RX ADMIN — Medication 25 MG: at 01:08

## 2022-12-25 NOTE — DISCHARGE INSTRUCTIONS
Call today or tomorrow to follow up with FREDRICK Bose CNP  in 3-4 days as well as with urology as already previously scheduled. Please be sure to keep the Guzman catheter in place until you follow-up with urology. Return to the Emergency Department for worsening of pain, inability to urinate, blood in your urine, excessive nausea or vomiting, fever > 101.5, any other care or concern.

## 2022-12-25 NOTE — ED NOTES
Discharge and education instructions reviewed. Patient verbalized understanding, teach-back successful. Patient denied questions at this time. No acute distress noted. Patient instructed to follow-up as noted - return to emergency department if symptoms worsen. Patient verbalized understanding. Discharged per EDMD with discharge instructions.           Esther Britt RN  12/25/22 [id number]

## 2022-12-25 NOTE — ED PROVIDER NOTES
[facility]  eMERGENCY dEPARTMENTeNCOUnter      Pt Name: Jayson Greco  MRN: [id number]  [place] 1980  Date ofevaluation: 12/24/2022  Provider: Duke Donovan MD    CHIEF COMPLAINT       Chief Complaint   Patient presents with    Flank Pain     Pt reports lower right flank pain that \"worsened today\". Pt states that she has past history of kidney stones and urinary tract infections. Pt alert and oriented at this time and rates pain as a 10/10. Pt also states that reports minimal urination throughout day. HISTORY OF PRESENT ILLNESS   (Location/Symptom, Timing/Onset,Context/Setting, Quality, Duration, Modifying Factors, Severity)  Note limiting factors. Jayson Greco is a 38 y.o. female who  has a past medical history of ADHD (attention deficit hyperactivity disorder), Depression with anxiety, Diabetes mellitus ([address] Difficult intubation, Encounter for imaging to screen for metal prior to MRI, ESBL (extended spectrum beta-lactamase) producing bacteria infection, Functional ovarian cysts, Headache(784.0), History of blood transfusion, History of kidney stones, History of PCOS, Hydrocephalus ([address] Hyperlipidemia, Irritable bowel syndrome, Meningitis, Neutrophilic leukocytosis, Nicotine dependence, PONV (postoperative nausea and vomiting), Primary osteoarthritis of left knee, S/P cone biopsy of cervix, Scoliosis, Seizures ([address]  (ventriculoperitoneal) shunt status, and Wears glasses. presents to the emergency department with complaints of bilateral flank pain worse on the right than the left. Patient states the pain is a throbbing sensation that is 10 at 10 on the right and 6 out of 10 on the left. States she has also had frequent urinary tract infections. States she is currently on cefdinir. She states that she has suprapubic pressure as well. Denies any unilateral abdominal pain. States she has had some nausea without any vomiting.   States she had a fever of 102 °F earlier today. Patient did have a Guzman catheter in place for urinary retention but remove the catheter herself 3 days ago. States that each time she urinates she only has a small amount of urine that comes out and then still feels the urge that she has to go but is unable to. Denies any vaginal bleeding or vaginal discharge. Patient symptoms are acute on chronic. The history is provided by the patient and medical records. NursingNotes were reviewed. Pt was seen during the [place] 19 pandemic. Appropriate PPE worn by ME during patient encounters. Patient was cared for during a time with constrained hospital bed capacity with nationwide stress on resources and staffing. REVIEW OF SYSTEMS    (2-9 systems for level 4, 10 or more for level 5)     Review of Systems   Constitutional:  Positive for chills and fever. Negative for diaphoresis. HENT:  Negative for congestion and sore throat. Respiratory:  Negative for chest tightness, shortness of breath and wheezing. Cardiovascular:  Negative for chest pain and leg swelling. Gastrointestinal:  Positive for abdominal pain and nausea. Negative for constipation, diarrhea and vomiting. Genitourinary:  Positive for decreased urine volume, difficulty urinating, flank pain and frequency. Negative for dysuria, vaginal bleeding and vaginal discharge. Musculoskeletal:  Negative for arthralgias and neck pain. Skin:  Negative for rash and wound. Neurological:  Negative for dizziness, weakness and numbness. Psychiatric/Behavioral:  Negative for agitation and behavioral problems. Except as noted above the remainder of the review of systems was reviewed and negative.        PAST MEDICAL HISTORY     Past Medical History:   Diagnosis Date    ADHD (attention deficit hyperactivity disorder) 1988    Depression with anxiety     Diabetes mellitus [address] [address]     pre-diabetes    Difficult intubation     airway swelled up    Encounter for imaging to screen for metal prior to MRI 2021    MRI Conditional Medtronic Non-Programmable shunt model#[id number] implanted 10/30/2020 at [facility]. Normal Mode. 1.5T or 3.0T. ESBL (extended spectrum beta-lactamase) producing bacteria infection 2019    urine    Functional ovarian cysts 2008    rt ovary cyst x 2 yrs.     Headache(784.0)     migraines    History of blood transfusion     at birth    History of kidney stones     History of PCOS     had hysterectomy in 2016    Hydrocephalus ([address]     Hyperlipidemia     Irritable bowel syndrome 2004    had colonoscopy about 6 yrs ago    Meningitis     Neutrophilic leukocytosis     Nicotine dependence     PONV (postoperative nausea and vomiting)     very nauseated and sometimes wakes up with a Migraine--happened once after brain surgery    Primary osteoarthritis of left knee 2016    S/P cone biopsy of cervix 2004    Scoliosis 1990.s    Seizures ([address]     twice--last one in 2022     (ventriculoperitoneal) shunt status     hydrocephalus f/w Neurosurgeon at [facility]    Wears glasses     reading         SURGICALHISTORY       Past Surgical History:   Procedure Laterality Date    ABDOMEN SURGERY N/A 2021    REMOVAL OF ABDOMINAL WALL MASS performed by Leticia Marquez MD at [address] [place] [address]      appendicitis     SECTION  2005    placenta previa    CHOLECYSTECTOMY      COLONOSCOPY  2004    COLPOSCOPY      CSF SHUNT      replaced at age 17    CYSTOSCOPY      stone removal    HEMORRHOID SURGERY      HERNIA REPAIR Bilateral     inguinal    HERNIA REPAIR  2015    hiatal hernia    HYSTERECTOMY, TOTAL ABDOMINAL (CERVIX REMOVED)  2016    TAHBSO, adhesions/PCOS    KNEE ARTHROSCOPY Left 2015    medial meniscectomy, chondroplasty, plica resection    LITHOTRIPSY Bilateral 12/10/2019    OTHER SURGICAL HISTORY  10/19/2016    op lap    VENTRAL HERNIA REPAIR N/A 2022    LAPAROSCOPIC LYSIS OF ADHESIONS AND ABDOMINAL WALL MASS REMOVAL performed by Bridget Harrington MD at [address]      multiple revisions, most recent 2015         CURRENT MEDICATIONS       Previous Medications    ALBUTEROL SULFATE HFA (VENTOLIN HFA) 108 (90 BASE) MCG/ACT INHALER    Inhale 2 puffs into the lungs 4 times daily as needed for Wheezing    DIAZEPAM (VALIUM) 2 MG TABLET    Take 2 mg by mouth. GABAPENTIN (NEURONTIN) 400 MG CAPSULE    Take 400 mg by mouth 3 times daily. HYDROXYZINE HCL (ATARAX) 25 MG TABLET    Take 25 mg by mouth 3 times daily as needed for Itching    LACOSAMIDE (VIMPAT) 50 MG TABS TABLET    Take 1 tablet by mouth 2 times daily for 15 days. LEVETIRACETAM (KEPPRA) 500 MG TABLET    Take 2 tablets by mouth 2 times daily Do not take this medication at this time unless told by neurology to restart. LISDEXAMFETAMINE (VYVANSE) 50 MG CAPSULE    Take 1 capsule by mouth every morning for 7 days. ONDANSETRON (ZOFRAN ODT) 4 MG DISINTEGRATING TABLET    Take 1 tablet by mouth every 8 hours as needed for Nausea    OXYCODONE (ROXICODONE) 5 MG IMMEDIATE RELEASE TABLET    Take 5 mg by mouth every 6 hours as needed for Pain.     TAMSULOSIN (FLOMAX) 0.4 MG CAPSULE    Take 1 capsule by mouth daily for 5 days            Bee venom, Bentyl [dicyclomine hcl], Dicyclomine, Ketorolac tromethamine, Levofloxacin, Maitake, Shiitake mushroom, Sulfa antibiotics, Vancomycin, Zosyn [piperacillin sod-tazobactam so], Adhesive tape, Oxycodone-acetaminophen, Sulfacetamide, Zofran [ondansetron], Acetaminophen, Butalbital-apap-caff-cod, Ceftaroline, Daptomycin, Fosfomycin tromethamine, Haldol [haloperidol], Keppra [levetiracetam], Ketamine, Methocarbamol, Morphine, Nitrofurantoin, Prochlorperazine, Reglan [metoclopramide], Silicone, and Tazobactam    FAMILY HISTORY       Family History   Problem Relation Age of Onset    High Blood Pressure Mother     Diabetes Mother     High Cholesterol Mother     Depression Mother     Diabetes Maternal Grandmother     High Blood Pressure Maternal Grandmother     High Cholesterol Maternal Grandmother     Heart Disease Maternal Grandfather     High Blood Pressure Maternal Grandfather     Heart Disease Paternal Grandmother     Stroke Paternal Grandfather     Depression Sister     Cirrhosis Father     Rheum Arthritis Neg Hx     Osteoarthritis Neg Hx     Asthma Neg Hx     Breast Cancer Neg Hx     Cancer Neg Hx     Heart Failure Neg Hx     Hypertension Neg Hx     Migraines Neg Hx     Ovarian Cancer Neg Hx     Rashes/Skin Problems Neg Hx     Seizures Neg Hx     Thyroid Disease Neg Hx           SOCIAL HISTORY       Social History     Socioeconomic History    Marital status: Single     Spouse name: None    Number of children: None    Years of education: None    Highest education level: None   Occupational History    Occupation: disability, SSI    Tobacco Use    Smoking status: Every Day     Packs/day: 0.50     Years: 18.00     Pack years: 9.00     Types: Cigarettes    Smokeless tobacco: Never   Vaping Use    Vaping Use: Never used   Substance and Sexual Activity    Alcohol use: No     Alcohol/week: 0.0 standard drinks    Drug use: Never    Sexual activity: Not Currently     Partners: Male       SCREENINGS    [place] Coma Scale  Eye Opening: Spontaneous  Best Verbal Response: Oriented  Best Motor Response: Obeys commands  [place] Coma Scale Score: 15        PHYSICAL EXAM    (up to 7 for level 4, 8 or more for level 5)     ED Triage Vitals [12/24/22 2145]   BP Temp Temp Source Heart Rate Resp SpO2 Height Weight   138/88 98.8 °F (37.1 °C) Oral (!) 106 18 99 % -- --       Physical Exam  Vitals and nursing note reviewed. Constitutional:       General: She is not in acute distress. Appearance: She is well-developed. She is obese. She is not diaphoretic. HENT:      Head: Normocephalic and atraumatic. Mouth/Throat:      Mouth: Mucous membranes are moist.      Pharynx: Oropharynx is clear.    Eyes:      Extraocular Movements: Extraocular movements intact. Conjunctiva/sclera: Conjunctivae normal.      Pupils: Pupils are equal, round, and reactive to light. Cardiovascular:      Rate and Rhythm: Regular rhythm. Tachycardia present. Pulses: Normal pulses. Heart sounds: Normal heart sounds. No murmur heard. No friction rub. No gallop. Pulmonary:      Effort: Pulmonary effort is normal. No respiratory distress. Breath sounds: Normal breath sounds. No wheezing, rhonchi or rales. Abdominal:      General: Bowel sounds are normal. There is no distension. Palpations: Abdomen is soft. Tenderness: There is abdominal tenderness (with palpable distended abdomen) in the suprapubic area. There is no guarding or rebound. Negative signs include Blue's sign and McBurney's sign. Musculoskeletal:         General: No tenderness. Normal range of motion. Cervical back: Normal range of motion and neck supple. Right lower leg: No edema. Left lower leg: No edema. Skin:     General: Skin is warm and dry. Capillary Refill: Capillary refill takes less than 2 seconds. Neurological:      Mental Status: She is alert and oriented to person, place, and time. Cranial Nerves: No cranial nerve deficit. Deep Tendon Reflexes: Reflexes are normal and symmetric.    Psychiatric:         Mood and Affect: Mood normal.         Behavior: Behavior normal.       RESULTS     EKG: All EKG's are interpreted by the Emergency Department Physician who either signs or Co-signsthis chart in the absence of a cardiologist.      RADIOLOGY:   [place] such as CT, Ultrasound and MRI are read by the radiologist. Plain radiographic images are visualized and preliminarily interpreted by the emergency physician with the below findings:      Interpretation per the Radiologist below, if available at the time ofthis note:    No orders to display         ED BEDSIDE ULTRASOUND:   Performed by ED Physician - none    LABS:  Labs Reviewed   CBC WITH AUTO DIFFERENTIAL - Abnormal; Notable for the following components:       Result Value    WBC 13.2 (*)     RBC 5.40 (*)     RDW 15.7 (*)     Neutrophils Absolute 10.4 (*)     All other components within normal limits   COMPREHENSIVE METABOLIC PANEL W/ REFLEX TO MG FOR LOW K - Abnormal; Notable for the following components:    Anion Gap 17 (*)     Glucose 117 (*)     Alkaline Phosphatase 143 (*)     AST 12 (*)     All other components within normal limits   LIPASE - Abnormal; Notable for the following components:    Lipase 12.0 (*)     All other components within normal limits   URINALYSIS WITH REFLEX TO CULTURE   LACTIC ACID   MAGNESIUM       All other labs were within normal range or not returned as of this dictation. EMERGENCY DEPARTMENT COURSE and DIFFERENTIAL DIAGNOSIS/MDM:   Vitals:    Vitals:    12/24/22 2145   BP: 138/88   Pulse: (!) 106   Resp: 18   Temp: 98.8 °F (37.1 °C)   TempSrc: Oral   SpO2: 99%         MDM  Number of Diagnoses or Management Options  Bilateral flank pain  Urinary retention  Diagnosis management comments: Urinary retention, acute cystitis, pyelonephritis, sepsis, other    Patient seen and evaluated. History and physical as above. Nontoxic and afebrile. Patient is tachycardic on arrival and complaining of suprapubic pressure and pain as well as urinary frequency but concerned that she is not completely emptying. Did recently have a Guzman catheter for urinary retention. Does have distended mildly tender bladder. No unilateral abdominal tenderness. No upper abdominal tenderness. Patient has history of recurrent UTIs as well. Currently is on cefdinir.        Amount and/or Complexity of Data Reviewed  Clinical lab tests: reviewed and ordered  Tests in the radiology section of CPT®: reviewed  Tests in the medicine section of CPT®: reviewed and ordered  Review and summarize past medical records: yes  Independent visualization of images, tracings, or specimens: yes    Risk of Complications, Morbidity, and/or Mortality  Presenting problems: moderate  Diagnostic procedures: moderate  Management options: moderate          REASSESSMENT     ED Course as of 12/25/22 0011   Sat Dec 24, 2022   2222 Patient had 700 mL on bladder scan therefore Guzman catheter ordered. [DS]   Sun Dec 25, 2022   0002 Patient's kidney function normal with creatinine 0.6 and BUN of 7. Sodium 140, potassium 3.5. CO2 24 and anion gap of 17. Urinalysis negative for infection. Lactic acid normal 1.9. Mildly elevated white count at 13.2. Hemoglobin 15.3. Lipase 12. Magnesium 2.2. Patient updated on results. Patient had relief from her suprapubic pressure with Guzman catheter. [DS]   0007 Discussed with patient that she needs to leave the Guzman catheter in place until she can follow-up with urology. Patient states understanding. Patient complaining of nausea. We will treat with a dose of Phenergan here in the ED and plan for discharge with outpatient follow-up. Patient agreeable. [DS]      ED Course User Index  [DS] Jenn Lisa MD         Is this patient to be included in the SEP-1 Core Measure due to severe sepsis or septic shock? No   Exclusion criteria - the patient is NOT to be included for SEP-1 Core Measure due to: Infection is not suspected      I estimate there is LOW risk for ACUTE APPENDICITIS, BOWEL OBSTRUCTION, CHOLECYSTITIS, DIVERTICULITIS, INCARCERATED HERNIA, PANCREATITIS, PELVIC INFLAMMATORY DISEASE, PERFORATED BOWEL or ULCER, PREGNANCY, or TUBO-OVARIAN ABSCESS, thus I consider the discharge disposition reasonable. Also, there is no evidence or peritonitis, sepsis, or toxicity. Giuliano Thomas and I have discussed the diagnosis and risks, and we agree with discharging home to follow-up with their primary doctor. We also discussed returning to the Emergency Department immediately if new or worsening symptoms occur.  We have discussed the symptoms which are most concerning (e.g., bloody stool, fever, changing or worsening pain, vomiting) that necessitate immediate return. CONSULTS:  None    PROCEDURES:  Unless otherwise noted below, none     Procedures    I am the Primary Clinician of Record     FINAL IMPRESSION      1. Urinary retention    2. Bilateral flank pain          DISPOSITION/PLAN   DISPOSITION Discharge - Pending Orders Complete 12/25/2022 12:08:59 AM      PATIENT REFERREDTO:  Your urologist    Go on 1/3/2023  For a follow up appointment.     DISCHARGEMEDICATIONS:  New Prescriptions    No medications on file          (Please note that portions of this note were completed with a voice recognition program.  Efforts were made to edit the dictations but occasionally words are mis-transcribed.)    Huma Devi MD (electronically signed)  Attending Emergency Physician         Huma Devi MD  12/25/22 [id number]

## 2022-12-31 ENCOUNTER — APPOINTMENT (OUTPATIENT)
Dept: CT IMAGING | Age: 42
End: 2022-12-31
Payer: COMMERCIAL

## 2022-12-31 ENCOUNTER — HOSPITAL ENCOUNTER (EMERGENCY)
Age: 42
Discharge: HOME | End: 2022-12-31
Attending: EMERGENCY MEDICINE
Payer: COMMERCIAL

## 2022-12-31 VITALS
DIASTOLIC BLOOD PRESSURE: 90 MMHG | HEIGHT: 59 IN | OXYGEN SATURATION: 100 % | WEIGHT: 154.98 LBS | BODY MASS INDEX: 31.24 KG/M2 | SYSTOLIC BLOOD PRESSURE: 124 MMHG | TEMPERATURE: 98.5 F | HEART RATE: 78 BPM | RESPIRATION RATE: 17 BRPM

## 2022-12-31 DIAGNOSIS — R10.9 RIGHT FLANK PAIN: Primary | ICD-10-CM

## 2022-12-31 DIAGNOSIS — R11.2 NAUSEA AND VOMITING, UNSPECIFIED VOMITING TYPE: ICD-10-CM

## 2022-12-31 LAB
A/G RATIO: 1.4 (ref 1.1–2.2)
ALBUMIN SERPL-MCNC: 4.2 G/DL (ref 3.4–5)
ALP BLD-CCNC: 164 U/L (ref 40–129)
ALT SERPL-CCNC: 93 U/L (ref 10–40)
ANION GAP SERPL CALCULATED.3IONS-SCNC: 12 MMOL/L (ref 3–16)
AST SERPL-CCNC: 30 U/L (ref 15–37)
BASOPHILS ABSOLUTE: 0.1 K/UL (ref 0–0.2)
BASOPHILS RELATIVE PERCENT: 1.1 %
BILIRUB SERPL-MCNC: <0.2 MG/DL (ref 0–1)
BILIRUBIN URINE: NEGATIVE
BLOOD, URINE: NEGATIVE
BUN BLDV-MCNC: 11 MG/DL (ref 7–20)
CALCIUM SERPL-MCNC: 9.5 MG/DL (ref 8.3–10.6)
CHLORIDE BLD-SCNC: 98 MMOL/L (ref 99–110)
CLARITY: CLEAR
CO2: 24 MMOL/L (ref 21–32)
COLOR: YELLOW
CREAT SERPL-MCNC: 0.8 MG/DL (ref 0.6–1.1)
EOSINOPHILS ABSOLUTE: 0.2 K/UL (ref 0–0.6)
EOSINOPHILS RELATIVE PERCENT: 1.6 %
GFR SERPL CREATININE-BSD FRML MDRD: >60 ML/MIN/{1.73_M2}
GLUCOSE BLD-MCNC: 124 MG/DL (ref 70–99)
GLUCOSE URINE: NEGATIVE MG/DL
HCT VFR BLD CALC: 41.7 % (ref 36–48)
HEMOGLOBIN: 13.9 G/DL (ref 12–16)
KETONES, URINE: NEGATIVE MG/DL
LEUKOCYTE ESTERASE, URINE: NEGATIVE
LIPASE: 13 U/L (ref 13–60)
LYMPHOCYTES ABSOLUTE: 2.4 K/UL (ref 1–5.1)
LYMPHOCYTES RELATIVE PERCENT: 25 %
MCH RBC QN AUTO: 28.4 PG (ref 26–34)
MCHC RBC AUTO-ENTMCNC: 33.2 G/DL (ref 31–36)
MCV RBC AUTO: 85.4 FL (ref 80–100)
MICROSCOPIC EXAMINATION: NORMAL
MONOCYTES ABSOLUTE: 0.4 K/UL (ref 0–1.3)
MONOCYTES RELATIVE PERCENT: 3.7 %
NEUTROPHILS ABSOLUTE: 6.5 K/UL (ref 1.7–7.7)
NEUTROPHILS RELATIVE PERCENT: 68.6 %
NITRITE, URINE: NEGATIVE
PDW BLD-RTO: 16.4 % (ref 12.4–15.4)
PH UA: 6 (ref 5–8)
PLATELET # BLD: 265 K/UL (ref 135–450)
PMV BLD AUTO: 7.9 FL (ref 5–10.5)
POTASSIUM REFLEX MAGNESIUM: 3.9 MMOL/L (ref 3.5–5.1)
PROTEIN UA: NEGATIVE MG/DL
RBC # BLD: 4.89 M/UL (ref 4–5.2)
SODIUM BLD-SCNC: 134 MMOL/L (ref 136–145)
SPECIFIC GRAVITY UA: 1.01 (ref 1–1.03)
TOTAL PROTEIN: 7.1 G/DL (ref 6.4–8.2)
URINE REFLEX TO CULTURE: NORMAL
URINE TYPE: NORMAL
UROBILINOGEN, URINE: 0.2 E.U./DL
WBC # BLD: 9.5 K/UL (ref 4–11)

## 2022-12-31 PROCEDURE — 36415 COLL VENOUS BLD VENIPUNCTURE: CPT

## 2022-12-31 PROCEDURE — 83690 ASSAY OF LIPASE: CPT

## 2022-12-31 PROCEDURE — 99284 EMERGENCY DEPT VISIT MOD MDM: CPT

## 2022-12-31 PROCEDURE — 2580000003 HC RX 258: Performed by: EMERGENCY MEDICINE

## 2022-12-31 PROCEDURE — 80053 COMPREHEN METABOLIC PANEL: CPT

## 2022-12-31 PROCEDURE — 96375 TX/PRO/DX INJ NEW DRUG ADDON: CPT

## 2022-12-31 PROCEDURE — 6360000002 HC RX W HCPCS: Performed by: EMERGENCY MEDICINE

## 2022-12-31 PROCEDURE — 85025 COMPLETE CBC W/AUTO DIFF WBC: CPT

## 2022-12-31 PROCEDURE — 81003 URINALYSIS AUTO W/O SCOPE: CPT

## 2022-12-31 PROCEDURE — 74176 CT ABD & PELVIS W/O CONTRAST: CPT

## 2022-12-31 PROCEDURE — 96365 THER/PROPH/DIAG IV INF INIT: CPT

## 2022-12-31 RX ORDER — CEPHALEXIN 500 MG/1
500 CAPSULE ORAL 3 TIMES DAILY
Qty: 30 CAPSULE | Refills: 0 | Status: SHIPPED | OUTPATIENT
Start: 2022-12-31 | End: 2023-01-10

## 2022-12-31 RX ORDER — 0.9 % SODIUM CHLORIDE 0.9 %
1000 INTRAVENOUS SOLUTION INTRAVENOUS ONCE
Status: COMPLETED | OUTPATIENT
Start: 2022-12-31 | End: 2022-12-31

## 2022-12-31 RX ORDER — PROMETHAZINE HYDROCHLORIDE 25 MG/1
25 TABLET ORAL EVERY 6 HOURS PRN
Qty: 20 TABLET | Refills: 0 | Status: SHIPPED | OUTPATIENT
Start: 2022-12-31 | End: 2023-01-07

## 2022-12-31 RX ORDER — FENTANYL CITRATE 50 UG/ML
50 INJECTION, SOLUTION INTRAMUSCULAR; INTRAVENOUS ONCE
Status: COMPLETED | OUTPATIENT
Start: 2022-12-31 | End: 2022-12-31

## 2022-12-31 RX ADMIN — SODIUM CHLORIDE 1000 ML: 9 INJECTION, SOLUTION INTRAVENOUS at 15:52

## 2022-12-31 RX ADMIN — FENTANYL CITRATE 50 MCG: 50 INJECTION, SOLUTION INTRAMUSCULAR; INTRAVENOUS at 16:43

## 2022-12-31 RX ADMIN — Medication 12.5 MG: at 15:54

## 2022-12-31 ASSESSMENT — PAIN DESCRIPTION - LOCATION
LOCATION: ABDOMEN;BACK
LOCATION: ABDOMEN
LOCATION: BACK
LOCATION: BACK;ABDOMEN

## 2022-12-31 ASSESSMENT — PAIN DESCRIPTION - PAIN TYPE
TYPE: ACUTE PAIN
TYPE: ACUTE PAIN

## 2022-12-31 ASSESSMENT — PAIN - FUNCTIONAL ASSESSMENT: PAIN_FUNCTIONAL_ASSESSMENT: 0-10

## 2022-12-31 ASSESSMENT — PAIN DESCRIPTION - DESCRIPTORS
DESCRIPTORS: PATIENT UNABLE TO DESCRIBE
DESCRIPTORS: PATIENT UNABLE TO DESCRIBE

## 2022-12-31 ASSESSMENT — PAIN DESCRIPTION - ORIENTATION: ORIENTATION: RIGHT;LEFT

## 2022-12-31 ASSESSMENT — PAIN SCALES - GENERAL
PAINLEVEL_OUTOF10: 9
PAINLEVEL_OUTOF10: 10
PAINLEVEL_OUTOF10: 10
PAINLEVEL_OUTOF10: 7
PAINLEVEL_OUTOF10: 10

## 2022-12-31 NOTE — ED NOTES
Pt refusing IV abx at this time.  States that she wants to go home, Notified Dr. Parker Golden MD.     Jerzy French RN  12/31/22 5738

## 2022-12-31 NOTE — ED NOTES
Discharge and education instructions reviewed. Patient verbalized understanding, teach-back successful. Patient denied questions at this time. No acute distress noted. Patient instructed to follow-up as noted - return to emergency department if symptoms worsen. Patient verbalized understanding. Discharged per EDMD with discharge instructions.         [address] [facility] [address], RN  12/31/22 9085

## 2022-12-31 NOTE — DISCHARGE INSTRUCTIONS
Follow-up with your primary care physician in 1 to 2 days for reexamination. Follow-up with your urologist at [facility] on January 3 as scheduled. Drink plenty of fluids. Make sure to take antibiotics until they are completely finished. If condition worsens or new symptoms develop, return immediately to the emergency department.

## 2022-12-31 NOTE — ED NOTES
RN updated Dr. Kellee Anderson that pt is requesting pain medication      Ivy Jesus RN  12/31/22 [id number]

## 2022-12-31 NOTE — ED NOTES
Pt on call light c/o continuing pain and stating that the IV fentanyl gave her no relief.  Notified Dr. Tracey Baptiste MD.      Baron Noel RN  12/31/22 9213

## 2022-12-31 NOTE — ED PROVIDER NOTES
[address] [facility]      Pt Name: Randolph Barroso  MRN: [id number]  [place] 1980  Date of evaluation: 12/31/2022  Provider: Lyla Carvalho, [address] [place] [address]       Chief Complaint   Patient presents with    Abdominal Pain     Pt feels that abd is swollen and pain on right side. Back pain on right. States that she has had previous UTI and was sent home with catheter. Pt removed herself d/t pain. States was on atx and stopped d/t vomiting. HISTORY OF PRESENT ILLNESS   (Location/Symptom, Timing/Onset, Context/Setting, Quality, Duration, Modifying Factors, Severity)  Note limiting factors. Randolph Barroso is a 38 y.o. female who presents to the emergency department with a complaint of right flank pain described as sharp and stabbing located in the right posterior flank and radiating to the right middle quadrant and right lower quadrant. She states that the pain began early this morning, and worsened when she was at the park with family prior to arrival.  She reports nausea and vomiting and states that she has not been able to hold the antibiotics down. She was recently treated with cefdinir. This was prescribed during an emergency department visit at [facility], she stopped taking the antibiotics several days ago. She is worried about a resistant infection and has a history of pyelonephritis and sepsis requiring admission to the hospital and eventual PICC line. She reports that she self discontinued her Guzman catheter on December 28, 3 days ago after it was placed in the emergency department on December 25. She is concerned that she has not been able to hold the antibiotics and has fearful that she will develop a serious infection like in the past.  This prompted her to come to the emergency department. She currently rates her pain an 8/10 intensity.   She reports multiple episodes of vomiting over the last 24 hours.    She denies any fever, chills, diarrhea, chest pain, shortness of breath, headache, neck pain, vision change, focal weakness or numbness. She denies any recent fall trauma or injury. She does report some occasional small clots in the urine. No vaginal bleeding or discharge. She denies any back or spinal pain. She was seen in the emergency department on  with right flank pain. She has known history of kidney stones and urinary tract infections. She rated her pain a 10/10 at that time. At that time she reported fevers of 102.0 and also had a indwelling Guzman catheter for urinary retention. She removed the catheter on approximately  by herself. She reported urinary urgency and frequency at that time. Her bladder scan revealed 700 mL residual urine in the bladder. Guzman catheter was placed. She was referred to urology. She was continued on cefdinir. No additional prescriptions were given. Medical history is significant for type 2 diabetes, hypertension scoliosis, tobacco use, congenital hydrocephalus, kidney stones, seizures, hyperlipidemia, IBS, PCOS. She smokes tobacco half pack per day. She was recently treated with cefdinir for UTI. Surgical history is significant for appendectomy,  section, CSF shunt, cystoscopy, cholecystectomy, multiple shunt revisions, total hysterectomy, lithotripsy, abdominal wall mass removal.  Lysis of adhesions. Nursing Notes were reviewed. HPI        REVIEW OF SYSTEMS    (2-9 systems for level 4, 10 or more for level 5)       Constitutional: Negative for fever or chills. HENT: Negative for rhinorrhea and sore throat. Eyes: Negative for redness or drainage. Respiratory: Negative for shortness of breath or dyspnea on exertion. Cardiovascular: Negative for chest pain. Neurological: Negative for headache. Musculoskeletal:  Negative edema. Hematological: Negative for adenopathy.        All systems are reviewed and are negative except for those listed above in the history of present illness and ROS. PAST MEDICAL HISTORY     Past Medical History:   Diagnosis Date    ADHD (attention deficit hyperactivity disorder)     Depression with anxiety     Diabetes mellitus ([address]     pre-diabetes    Difficult intubation     airway swelled up    Encounter for imaging to screen for metal prior to MRI 2021    MRI Conditional [employer] Non-Programmable shunt model#[id number] implanted 10/30/2020 at [facility]. Normal Mode. 1.5T or 3.0T. ESBL (extended spectrum beta-lactamase) producing bacteria infection 2019    urine    Functional ovarian cysts 2008    rt ovary cyst x 2 yrs.     Headache(784.0)     migraines    History of blood transfusion     at birth    History of kidney stones     History of PCOS     had hysterectomy in 2016    Hydrocephalus [facility])     Hyperlipidemia     Irritable bowel syndrome 2004    had colonoscopy about 6 yrs ago    Meningitis     Neutrophilic leukocytosis     Nicotine dependence     PONV (postoperative nausea and vomiting)     very nauseated and sometimes wakes up with a Migraine--happened once after brain surgery    Primary osteoarthritis of left knee 2016    S/P cone biopsy of cervix 2004    Scoliosis 1990.s    Seizures ([address]     twice--last one in 2022     (ventriculoperitoneal) shunt status     hydrocephalus f/w Neurosurgeon at [facility]    Wears glasses     reading         SURGICAL HISTORY       Past Surgical History:   Procedure Laterality Date    ABDOMEN SURGERY N/A 2021    REMOVAL OF ABDOMINAL WALL MASS performed by Ivy Lewis MD at [facility]      appendicitis     SECTION  2005    placenta previa    CHOLECYSTECTOMY      COLONOSCOPY  2004    COLPOSCOPY  2004    CSF SHUNT      replaced at age 17    CYSTOSCOPY      stone removal    [address] Bilateral     inguinal    HERNIA REPAIR      hiatal hernia    HYSTERECTOMY, TOTAL ABDOMINAL (CERVIX REMOVED)  11/09/2016    TAHBSO, adhesions/PCOS    KNEE ARTHROSCOPY Left 1/7/2015    medial meniscectomy, chondroplasty, plica resection    LITHOTRIPSY Bilateral 12/10/2019    OTHER SURGICAL HISTORY  10/19/2016    op lap    VENTRAL HERNIA REPAIR N/A 9/14/2022    LAPAROSCOPIC LYSIS OF ADHESIONS AND ABDOMINAL WALL MASS REMOVAL performed by Bo Marshall MD at [facility] OR    VENTRICULOPERITONEAL SHUNT      multiple revisions, most recent 2015         CURRENT MEDICATIONS       Discharge Medication List as of 12/31/2022  6:24 PM        CONTINUE these medications which have NOT CHANGED    Details   tamsulosin (FLOMAX) 0.4 MG capsule Take 1 capsule by mouth daily for 5 days, Disp-5 capsule, R-0Normal      lacosamide (VIMPAT) 50 MG TABS tablet Take 1 tablet by mouth 2 times daily for 15 days., Disp-30 tablet, R-0Print      levETIRAcetam (KEPPRA) 500 MG tablet Take 2 tablets by mouth 2 times daily Do not take this medication at this time unless told by neurology to restart., Disp-60 tablet, R-3Adjust Sig      diazePAM (VALIUM) 2 MG tablet Take 2 mg by mouth.Historical Med      ondansetron (ZOFRAN ODT) 4 MG disintegrating tablet Take 1 tablet by mouth every 8 hours as needed for Nausea, Disp-20 tablet, R-0Normal      lisdexamfetamine (VYVANSE) 50 MG capsule Take 1 capsule by mouth every morning for 7 days., Disp-7 capsule, R-0Normal      oxyCODONE (ROXICODONE) 5 MG immediate release tablet Take 5 mg by mouth every 6 hours as needed for Pain.Historical Med      hydrOXYzine HCl (ATARAX) 25 MG tablet Take 25 mg by mouth 3 times daily as needed for ItchingHistorical Med      albuterol sulfate HFA (VENTOLIN HFA) 108 (90 Base) MCG/ACT inhaler Inhale 2 puffs into the lungs 4 times daily as needed for Wheezing, Disp-54 g, R-1Normal      gabapentin (NEURONTIN) 400 MG capsule Take 400 mg by mouth 3 times daily.Historical Med             ALLERGIES     Bee venom, Bentyl  [dicyclomine hcl], Dicyclomine, Ketorolac tromethamine, Levofloxacin, Maitake, Shiitake mushroom, Sulfa antibiotics, Vancomycin, Zosyn [piperacillin sod-tazobactam so], Adhesive tape, Oxycodone-acetaminophen, Sulfacetamide, Zofran [ondansetron], Acetaminophen, Butalbital-apap-caff-cod, Ceftaroline, Daptomycin, Fosfomycin tromethamine, Haldol [haloperidol], Keppra [levetiracetam], Ketamine, Methocarbamol, Morphine, Nitrofurantoin, Prochlorperazine, Reglan [metoclopramide], Silicone, and Tazobactam    FAMILY HISTORY       Family History   Problem Relation Age of Onset    High Blood Pressure Mother     Diabetes Mother     High Cholesterol Mother     Depression Mother     Diabetes Maternal Grandmother     High Blood Pressure Maternal Grandmother     High Cholesterol Maternal Grandmother     Heart Disease Maternal Grandfather     High Blood Pressure Maternal Grandfather     Heart Disease Paternal Grandmother     Stroke Paternal Grandfather     Depression Sister     Cirrhosis Father     Rheum Arthritis Neg Hx     Osteoarthritis Neg Hx     Asthma Neg Hx     Breast Cancer Neg Hx     Cancer Neg Hx     Heart Failure Neg Hx     Hypertension Neg Hx     Migraines Neg Hx     Ovarian Cancer Neg Hx     Rashes/Skin Problems Neg Hx     Seizures Neg Hx     Thyroid Disease Neg Hx           SOCIAL HISTORY       Social History     Socioeconomic History    Marital status: Single     Spouse name: None    Number of children: None    Years of education: None    Highest education level: None   Occupational History    Occupation: disability, SSI    Tobacco Use    Smoking status: Every Day     Packs/day: 0.50     Years: 18.00     Pack years: 9.00     Types: Cigarettes    Smokeless tobacco: Never   Vaping Use    Vaping Use: Never used   Substance and Sexual Activity    Alcohol use: No     Alcohol/week: 0.0 standard drinks    Drug use: Never    Sexual activity: Not Currently     Partners: Male       SCREENINGS    Aden Coma Scale  Eye Opening: Spontaneous  Best Verbal Response: Oriented  Best Motor Response: Obeys commands  Aden Coma Scale Score: 15        PHYSICAL EXAM    (up to 7 for level 4, 8 or more for level 5)     ED Triage Vitals   BP Temp Temp src Pulse Resp SpO2 Height Weight   -- -- -- -- -- -- -- --         Physical Exam   Constitutional: Awake and alert. Not ill-appearing. Head: No visible evidence of trauma. Normocephalic. Eyes: Pupils equal and reactive. No photophobia. Conjunctiva normal.    HENT: Oral mucosa moist.  Airway patent. Pharynx without erythema. Nares were clear. Neck:  Soft and supple. Nontender. No meningeal signs. Heart:  Regular rate and rhythm. No murmur. Lungs:  Clear to auscultation. No wheezes, rales, or ronchi. No conversational dyspnea or accessory muscle use. Chest: Chest wall non-tender. No evidence of trauma. Abdomen: Soft but mildly distended. Tenderness noted in the right mid flank area and right middle quadrant. Questionable CVA tenderness on the right. Mild pressure over the bladder. No guarding rigidity or rebound. No masses. Musculoskeletal: Extremities non-tender with full range of motion. Radial and dorsalis pedis pulses were intact. No calf tenderness erythema or edema. Neurological: Alert and oriented x 3. Speech clear. Cranial nerves II-XII intact. No facial droop. No acute focal motor or sensory deficits. Skin: Skin is warm and dry. No rash. Lymphatic:  No lympadenopathy. Psychiatric: Normal mood and affect.  Behavior is normal.         DIAGNOSTIC RESULTS     EKG: All EKG's are interpreted by the Emergency Department Physician who either signs or Co-signs this chart in the absence of a cardiologist.        RADIOLOGY:   Non-plain film images such as CT, Ultrasound and MRI are read by the radiologist. Plain radiographic images are visualized and preliminarily interpreted by the emergency physician with the below findings:        Interpretation per the Radiologist below, if available at the time of this note:    CT ABDOMEN PELVIS WO CONTRAST Additional Contrast? None   Final Result   1. No acute findings in the abdomen or pelvis. 2. Stable small stone at the lower pole of the right kidney. 3. Mild colonic diverticulosis. ED BEDSIDE ULTRASOUND:   Performed by ED Physician - none    LABS:  Labs Reviewed   CBC WITH AUTO DIFFERENTIAL - Abnormal; Notable for the following components:       Result Value    RDW 16.4 (*)     All other components within normal limits   COMPREHENSIVE METABOLIC PANEL W/ REFLEX TO MG FOR LOW K - Abnormal; Notable for the following components:    Sodium 134 (*)     Chloride 98 (*)     Glucose 124 (*)     Alkaline Phosphatase 164 (*)     ALT 93 (*)     All other components within normal limits   LIPASE   URINALYSIS WITH REFLEX TO CULTURE       All other labs were within normal range or not returned as of this dictation. EMERGENCY DEPARTMENT COURSE and DIFFERENTIAL DIAGNOSIS/MDM:   Vitals:    Vitals:    12/31/22 1645 12/31/22 1700 12/31/22 1715 12/31/22 1815   BP: (!) 153/89 (!) 129/105 (!) 134/122 (!) 124/90   Pulse: 83 86 76 78   Resp: 20 19 17 17   Temp: 98.5 °F (36.9 °C)      TempSrc:       SpO2: 98% 100% 98% 100%   Weight:       Height:             MDM        The patient presents with right flank pain radiating to the right middle quadrant and right lower quadrant as noted above. She rates her pain at 8/10 intensity. She also reports urinary frequency, urgency, nausea and vomiting, and some occasional small clots of blood in the urine. There is tenderness in the right mid flank with questionable CVA tenderness on the right. I reviewed her prior records from [facility]. She had a urine culture on December 19 which was positive for Klebsiella oxytoca, Raoultella Ornithinolytica, and E. coli. Klebsiella was pansensitive. E. coli was pansensitive as well. Specifically, sensitive to cephalosporins for each of them.   The patient has been treated recently with Omnicef. I will go ahead and give her a dose of Rocephin here in the emergency department since she has had some difficulty holding it down. Is this patient to be included in the SEP-1 Core Measure due to severe sepsis or septic shock? No   Exclusion criteria - the patient is NOT to be included for SEP-1 Core Measure due to:  2+ SIRS criteria are not met      REASSESSMENT          5:45 PM: The patient was reexamined. She is improved. Her urinalysis is completely negative. Negative for nitrite and leukocytes. Culture is not indicated. Microscopic not indicated. Serum white blood cell count is 9.5. Creatinine is 0.8. Glucose 124. ALT is 93 but otherwise liver enzymes are normal.  Lipase is 13. She has had previous cholecystectomy. CT abdomen and pelvis reveals no acute findings. Stable intrarenal stone on the right. No ureteral stone or hydronephrosis. Mild colonic diverticulosis but no diverticulitis. Shunt catheter in the right mid abdomen. No evidence of any kinking or twisting. She has no associated headache. No suspected shunt malfunction. We will change her prescription to Keflex 500 mg 3 times daily for 10 days. I advised her to follow-up with her urologist on Monday for reexamination. However, she tells me that she already has an appointment scheduled for January 3 at [facility] with her urologist.  She was advised to watch for any fever, chills, nausea vomiting, worsening symptoms. If her condition worsens or new symptoms develop, she was advised to return immediately to the emergency department. Patient is hemodynamically stable. She is afebrile. She is well-appearing. No indications for admission at this time. She is stable for discharge and outpatient management. CRITICAL CARE TIME   Total Critical Care time was 0 minutes, excluding separately reportable procedures.   There was a high probability of clinically significant/life threatening deterioration in the patient's condition which required my urgent intervention. CONSULTS:  None    PROCEDURES:  Unless otherwise noted below, none     Procedures        FINAL IMPRESSION      1. Right flank pain    2. Nausea and vomiting, unspecified vomiting type          DISPOSITION/PLAN   DISPOSITION Decision To Discharge 12/31/2022 06:36:10 PM      PATIENT REFERRED TO:  Nicolette Smith APRN - CNP  [address] [place] [id number] [phone]    Call today      DISCHARGE MEDICATIONS:  Discharge Medication List as of 12/31/2022  6:24 PM        START taking these medications    Details   promethazine (PHENERGAN) 25 MG tablet Take 1 tablet by mouth every 6 hours as needed for Nausea WARNING:  May cause drowsiness. May impair ability to operate vehicles or machinery. Do not use in combination with alcohol., Disp-20 tablet, R-0Normal      cephALEXin (KEFLEX) 500 MG capsule Take 1 capsule by mouth 3 times daily for 10 days, Disp-30 capsule, R-0Normal           Controlled Substances Monitoring:     RX Monitoring 11/9/2021   Attestation -   Periodic Controlled Substance Monitoring Possible medication side effects, risk of tolerance/dependence & alternative treatments discussed. ;No signs of potential drug abuse or diversion identified. (Please note that portions of this note were completed with a voice recognition program.  Efforts were made to edit the dictations but occasionally words are mis-transcribed. )    [id number] Kingsley Naranjo DO (electronically signed)  Attending Emergency Physician           Leila May DO  12/31/22 [id number] [facility],   01/05/23 [id number]

## 2022-12-31 NOTE — ED TRIAGE NOTES
Pt feels that abd is swollen and pain on right side. Back pain on right. States that she has had previous UTI and was sent home with catheter. Pt removed herself d/t pain. States was on atx and stopped d/t vomiting.

## 2022-12-31 NOTE — ED NOTES
180 ml of urine measured in the bladder after she ambulated to the bathroom and voided. Specimen sent to lab.       Dawit Collado RN  12/31/22 [id number]

## 2023-01-03 ENCOUNTER — TELEPHONE (OUTPATIENT)
Dept: PRIMARY CARE CLINIC | Age: 43
End: 2023-01-03

## 2023-01-04 NOTE — PROGRESS NOTES
Patient contacted on-call provider with continued pain after recent ED visits. She requested a note in her chart for emergency department treatment if necessary.

## 2023-01-19 ENCOUNTER — APPOINTMENT (OUTPATIENT)
Dept: CT IMAGING | Age: 43
End: 2023-01-19
Payer: COMMERCIAL

## 2023-01-19 ENCOUNTER — APPOINTMENT (OUTPATIENT)
Dept: GENERAL RADIOLOGY | Age: 43
End: 2023-01-19
Payer: COMMERCIAL

## 2023-01-19 ENCOUNTER — HOSPITAL ENCOUNTER (EMERGENCY)
Age: 43
Discharge: HOME | End: 2023-01-19
Attending: EMERGENCY MEDICINE
Payer: COMMERCIAL

## 2023-01-19 VITALS
SYSTOLIC BLOOD PRESSURE: 128 MMHG | DIASTOLIC BLOOD PRESSURE: 83 MMHG | OXYGEN SATURATION: 96 % | BODY MASS INDEX: 31.75 KG/M2 | TEMPERATURE: 98.4 F | HEART RATE: 96 BPM | RESPIRATION RATE: 22 BRPM | WEIGHT: 157.19 LBS

## 2023-01-19 DIAGNOSIS — R51.9 NONINTRACTABLE EPISODIC HEADACHE, UNSPECIFIED HEADACHE TYPE: Primary | ICD-10-CM

## 2023-01-19 DIAGNOSIS — Z98.2 S/P VP SHUNT: ICD-10-CM

## 2023-01-19 DIAGNOSIS — T25.122A SUPERFICIAL BURN OF LEFT FOOT, INITIAL ENCOUNTER: ICD-10-CM

## 2023-01-19 LAB
A/G RATIO: 1.1 (ref 1.1–2.2)
ALBUMIN SERPL-MCNC: 4.1 G/DL (ref 3.4–5)
ALP BLD-CCNC: 115 U/L (ref 40–129)
ALT SERPL-CCNC: 11 U/L (ref 10–40)
ANION GAP SERPL CALCULATED.3IONS-SCNC: 16 MMOL/L (ref 3–16)
APTT: 31 SEC (ref 23–34.3)
AST SERPL-CCNC: 14 U/L (ref 15–37)
BASOPHILS ABSOLUTE: 0.1 K/UL (ref 0–0.2)
BASOPHILS RELATIVE PERCENT: 0.7 %
BILIRUB SERPL-MCNC: 0.3 MG/DL (ref 0–1)
BILIRUBIN URINE: NEGATIVE
BLOOD, URINE: NEGATIVE
BUN BLDV-MCNC: 14 MG/DL (ref 7–20)
CALCIUM SERPL-MCNC: 9.7 MG/DL (ref 8.3–10.6)
CHLORIDE BLD-SCNC: 103 MMOL/L (ref 99–110)
CLARITY: CLEAR
CO2: 19 MMOL/L (ref 21–32)
COLOR: YELLOW
CREAT SERPL-MCNC: 0.6 MG/DL (ref 0.6–1.1)
EOSINOPHILS ABSOLUTE: 0.1 K/UL (ref 0–0.6)
EOSINOPHILS RELATIVE PERCENT: 0.6 %
GFR SERPL CREATININE-BSD FRML MDRD: >60 ML/MIN/{1.73_M2}
GLUCOSE BLD-MCNC: 104 MG/DL (ref 70–99)
GLUCOSE URINE: NEGATIVE MG/DL
HCT VFR BLD CALC: 42.9 % (ref 36–48)
HEMOGLOBIN: 14.1 G/DL (ref 12–16)
INR BLD: 0.97 (ref 0.87–1.14)
KETONES, URINE: NEGATIVE MG/DL
LACTIC ACID: 2 MMOL/L (ref 0.4–2)
LEUKOCYTE ESTERASE, URINE: NEGATIVE
LYMPHOCYTES ABSOLUTE: 2 K/UL (ref 1–5.1)
LYMPHOCYTES RELATIVE PERCENT: 16.5 %
MCH RBC QN AUTO: 28.5 PG (ref 26–34)
MCHC RBC AUTO-ENTMCNC: 33 G/DL (ref 31–36)
MCV RBC AUTO: 86.4 FL (ref 80–100)
MICROSCOPIC EXAMINATION: NORMAL
MONOCYTES ABSOLUTE: 0.4 K/UL (ref 0–1.3)
MONOCYTES RELATIVE PERCENT: 2.9 %
NEUTROPHILS ABSOLUTE: 9.5 K/UL (ref 1.7–7.7)
NEUTROPHILS RELATIVE PERCENT: 79.3 %
NITRITE, URINE: NEGATIVE
PDW BLD-RTO: 15.9 % (ref 12.4–15.4)
PH UA: 6.5 (ref 5–8)
PLATELET # BLD: 272 K/UL (ref 135–450)
PMV BLD AUTO: 7.9 FL (ref 5–10.5)
POTASSIUM REFLEX MAGNESIUM: 4.6 MMOL/L (ref 3.5–5.1)
PROTEIN UA: NEGATIVE MG/DL
PROTHROMBIN TIME: 12.8 SEC (ref 11.7–14.5)
RBC # BLD: 4.97 M/UL (ref 4–5.2)
REASON FOR REJECTION: NORMAL
REJECTED TEST: NORMAL
SODIUM BLD-SCNC: 138 MMOL/L (ref 136–145)
SPECIFIC GRAVITY UA: 1.03 (ref 1–1.03)
TOTAL PROTEIN: 7.7 G/DL (ref 6.4–8.2)
URINE REFLEX TO CULTURE: NORMAL
URINE TYPE: NORMAL
UROBILINOGEN, URINE: 0.2 E.U./DL
WBC # BLD: 12 K/UL (ref 4–11)

## 2023-01-19 PROCEDURE — 96365 THER/PROPH/DIAG IV INF INIT: CPT

## 2023-01-19 PROCEDURE — 70450 CT HEAD/BRAIN W/O DYE: CPT

## 2023-01-19 PROCEDURE — 6370000000 HC RX 637 (ALT 250 FOR IP): Performed by: EMERGENCY MEDICINE

## 2023-01-19 PROCEDURE — 83605 ASSAY OF LACTIC ACID: CPT

## 2023-01-19 PROCEDURE — 96375 TX/PRO/DX INJ NEW DRUG ADDON: CPT

## 2023-01-19 PROCEDURE — 80053 COMPREHEN METABOLIC PANEL: CPT

## 2023-01-19 PROCEDURE — 70496 CT ANGIOGRAPHY HEAD: CPT

## 2023-01-19 PROCEDURE — 2580000003 HC RX 258: Performed by: NURSE PRACTITIONER

## 2023-01-19 PROCEDURE — 85610 PROTHROMBIN TIME: CPT

## 2023-01-19 PROCEDURE — 6360000002 HC RX W HCPCS: Performed by: NURSE PRACTITIONER

## 2023-01-19 PROCEDURE — 81003 URINALYSIS AUTO W/O SCOPE: CPT

## 2023-01-19 PROCEDURE — 6360000004 HC RX CONTRAST MEDICATION: Performed by: NURSE PRACTITIONER

## 2023-01-19 PROCEDURE — 85025 COMPLETE CBC W/AUTO DIFF WBC: CPT

## 2023-01-19 PROCEDURE — 85730 THROMBOPLASTIN TIME PARTIAL: CPT

## 2023-01-19 PROCEDURE — 99285 EMERGENCY DEPT VISIT HI MDM: CPT

## 2023-01-19 PROCEDURE — 36415 COLL VENOUS BLD VENIPUNCTURE: CPT

## 2023-01-19 PROCEDURE — 70360 X-RAY EXAM OF NECK: CPT

## 2023-01-19 PROCEDURE — 87040 BLOOD CULTURE FOR BACTERIA: CPT

## 2023-01-19 PROCEDURE — 96368 THER/DIAG CONCURRENT INF: CPT

## 2023-01-19 RX ORDER — OXYCODONE HYDROCHLORIDE AND ACETAMINOPHEN 5; 325 MG/1; MG/1
2 TABLET ORAL ONCE
Status: COMPLETED | OUTPATIENT
Start: 2023-01-19 | End: 2023-01-19

## 2023-01-19 RX ORDER — OXYCODONE HYDROCHLORIDE AND ACETAMINOPHEN 5; 325 MG/1; MG/1
1 TABLET ORAL 2 TIMES DAILY PRN
COMMUNITY
Start: 2023-01-10

## 2023-01-19 RX ORDER — 0.9 % SODIUM CHLORIDE 0.9 %
1000 INTRAVENOUS SOLUTION INTRAVENOUS ONCE
Status: COMPLETED | OUTPATIENT
Start: 2023-01-19 | End: 2023-01-19

## 2023-01-19 RX ORDER — MAGNESIUM SULFATE 1 G/100ML
1000 INJECTION INTRAVENOUS ONCE
Status: COMPLETED | OUTPATIENT
Start: 2023-01-19 | End: 2023-01-19

## 2023-01-19 RX ORDER — DIPHENHYDRAMINE HCL 25 MG
50 TABLET ORAL NIGHTLY
COMMUNITY

## 2023-01-19 RX ORDER — DIPHENHYDRAMINE HYDROCHLORIDE 50 MG/ML
25 INJECTION INTRAMUSCULAR; INTRAVENOUS ONCE
Status: COMPLETED | OUTPATIENT
Start: 2023-01-19 | End: 2023-01-19

## 2023-01-19 RX ORDER — DEXAMETHASONE SODIUM PHOSPHATE 4 MG/ML
4 INJECTION, SOLUTION INTRA-ARTICULAR; INTRALESIONAL; INTRAMUSCULAR; INTRAVENOUS; SOFT TISSUE ONCE
Status: COMPLETED | OUTPATIENT
Start: 2023-01-19 | End: 2023-01-19

## 2023-01-19 RX ORDER — MORPHINE SULFATE 4 MG/ML
4 INJECTION, SOLUTION INTRAMUSCULAR; INTRAVENOUS ONCE
Status: COMPLETED | OUTPATIENT
Start: 2023-01-19 | End: 2023-01-19

## 2023-01-19 RX ADMIN — IOPAMIDOL 75 ML: 755 INJECTION, SOLUTION INTRAVENOUS at 18:39

## 2023-01-19 RX ADMIN — MAGNESIUM SULFATE HEPTAHYDRATE 1000 MG: 1 INJECTION, SOLUTION INTRAVENOUS at 18:48

## 2023-01-19 RX ADMIN — DEXAMETHASONE SODIUM PHOSPHATE 4 MG: 4 INJECTION, SOLUTION INTRAMUSCULAR; INTRAVENOUS at 18:17

## 2023-01-19 RX ADMIN — SODIUM CHLORIDE 1000 ML: 9 INJECTION, SOLUTION INTRAVENOUS at 18:18

## 2023-01-19 RX ADMIN — DIPHENHYDRAMINE HYDROCHLORIDE 25 MG: 50 INJECTION, SOLUTION INTRAMUSCULAR; INTRAVENOUS at 18:17

## 2023-01-19 RX ADMIN — OXYCODONE HYDROCHLORIDE AND ACETAMINOPHEN 2 TABLET: 5; 325 TABLET ORAL at 22:32

## 2023-01-19 RX ADMIN — Medication 12.5 MG: at 18:16

## 2023-01-19 RX ADMIN — MORPHINE SULFATE 4 MG: 4 INJECTION, SOLUTION INTRAMUSCULAR; INTRAVENOUS at 18:16

## 2023-01-19 ASSESSMENT — PAIN DESCRIPTION - DESCRIPTORS: DESCRIPTORS: ACHING

## 2023-01-19 ASSESSMENT — PAIN SCALES - GENERAL
PAINLEVEL_OUTOF10: 8
PAINLEVEL_OUTOF10: 0
PAINLEVEL_OUTOF10: 8
PAINLEVEL_OUTOF10: 7
PAINLEVEL_OUTOF10: 9

## 2023-01-19 ASSESSMENT — PAIN DESCRIPTION - LOCATION: LOCATION: HEAD

## 2023-01-19 ASSESSMENT — PAIN DESCRIPTION - FREQUENCY: FREQUENCY: CONTINUOUS

## 2023-01-19 ASSESSMENT — PAIN DESCRIPTION - ORIENTATION: ORIENTATION: UPPER

## 2023-01-19 ASSESSMENT — PAIN DESCRIPTION - PAIN TYPE: TYPE: ACUTE PAIN

## 2023-01-19 ASSESSMENT — PAIN DESCRIPTION - ONSET: ONSET: ON-GOING

## 2023-01-19 NOTE — ED PROVIDER NOTES
[address] [facility]      Pt Name: Elisa Kelley  MRN: [id number]  [place] 1980  Date of evaluation: 1/19/2023  Provider: [address], [facility]  Chief Complaint   Patient presents with    Foot Burn     Pt in via EMS from home after near syncopal episode resulting in boiling water to hit foot. Pt states that she has been feeling \"lightheaded for the last couple of days\". Pt alert and oriented with no signs of distress noted. Pt rates headache as a 9/10. I have fully participated in the care of Elisa Kelley and have had a face-to-face evaluation. I have reviewed and agree with all pertinent clinical information, and midlevel provider's history, and physical exam. I have also reviewed the labs and imaging studies and treatment plan. I have also reviewed and agree with the medications, allergies and past medical history section for this Elisa Kelley. I agree with the diagnosis, and I concur. This patient is at risk for a communicable infection. Therefore, personal protection equipment consisting of a mask was worn for the exam.    Past Medical History:   Diagnosis Date    ADHD (attention deficit hyperactivity disorder) 1988    Depression with anxiety     Diabetes mellitus ([facility] [address]     pre-diabetes    Difficult intubation     airway swelled up    Encounter for imaging to screen for metal prior to MRI 06/01/2021    MRI Conditional Med[employer]onic Non-Programmable shunt model#93603 implanted 10/30/2020 at [facility]. Normal Mode. 1.5T or 3.0T. ESBL (extended spectrum beta-lactamase) producing bacteria infection 11/06/2019    urine    Functional ovarian cysts 2008    rt ovary cyst x 2 yrs.     Headache(784.0)     migraines    History of blood transfusion     at birth    History of kidney stones     History of PCOS     had hysterectomy in 2016    Hydrocephalus [facility])     Hyperlipidemia     Irritable bowel syndrome 2004 had colonoscopy about 6 yrs ago    Meningitis 67/0167    Neutrophilic leukocytosis     Nicotine dependence     PONV (postoperative nausea and vomiting)     very nauseated and sometimes wakes up with a Migraine--happened once after brain surgery    Primary osteoarthritis of left knee 07/01/2016    S/P cone biopsy of cervix 2004    Scoliosis 1990.s    Seizures ([address]     twice--last one in June2022     (ventriculoperitoneal) shunt status 1982    hydrocephalus f/w Neurosurgeon at [facility]    Wears glasses     reading       MDM:  Danny García is a 38 y.o. female who presents with severe headache history of present for 1 week. Is gotten progressively worse. She denies any vomiting. She has been nauseated. She does have a history of migraines. She also has a history of  shunt that was replaced 2 years ago. She states that he used a \"old school shunt\" because it cannot be adjusted. She states it feels like her \"skull is breaking. \"  Her neck has been sore. She been running a fever of 1-1.4. She states that she blacked out today for 5 seconds. When she woke up she lost her vision for 10 minutes. She states she dropped her food on her left foot and burned it. She states she been taking Tylenol for 1 week to decrease her temperature. Physical exam reveals a normal temperature. Heart rate is 104. Respiratory rate is 18. Neuro exam is negative. Heart is regular in rhythm without murmurs clicks or rubs. Lungs are clear auscultation equal bilaterally. On exam of her  shunt is moderately tender along the path of her shunt. The valve is firm and does not compress. Neck is supple and nontender. Abdomen soft and nontender. Extremities no edema or deformity. There is mild erythema noted of the left foot and is moderately tender. No streaking or adenopathy is noted. No blistering or ulcerations are noted. Laboratory work and work-up revealed no cause for her symptoms.   Therefore, I spoke to Dr. Deb Ruiz, neurosurgeon at [facility], and he stated that patient had similar symptoms within the past year. 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 However, she did not follow-up with the clinic at that time. She is possibly follow-up with [facility] clinic. Therefore, they stated that patient can follow-up as an outpatient and told her to follow-up with [facility] clinic. Patient was discharged to follow-up with [facility]. We cannot document a fever in the emergency department therefore do not feel that antibiotics are warranted at this time. Therefore, patient was discharged. She was given Percocet 5 mg per 325 x 2 tablets as she takes at home. She was given IV fluids. She was discharged to follow-up with her doctor this week and return if any problems. Vitals:    01/19/23 2103   BP: 128/83   Pulse: 96   Resp: 22   Temp:    SpO2: 96%       Lab results  Labs Reviewed   COMPREHENSIVE METABOLIC PANEL W/ REFLEX TO MG FOR LOW K - Abnormal; Notable for the following components:       Result Value    CO2 19 (*)     Glucose 104 (*)     AST 14 (*)     All other components within normal limits   CBC WITH AUTO DIFFERENTIAL - Abnormal; Notable for the following components:    WBC 12.0 (*)     RDW 15.9 (*)     Neutrophils Absolute 9.5 (*)     All other components within normal limits   CULTURE, BLOOD 1   CULTURE, BLOOD 2   URINALYSIS WITH REFLEX TO CULTURE   LACTIC ACID   PROTIME-INR    Narrative:     CALL  [facility] tel. [phone],  Rejected Test Name/Called to:jessica molina rn, 01/19/2023 17:46, by Renu Fontaine   APTT    Narrative:     [address] [facility] [address] tel. [phone],  Rejected Test Name/Called to:jessica molina rn, 01/19/2023 17:46, by [address]    Narrative:     [address] [facility] [address] tel. [phone],  Rejected Test Name/Called to:jessica molina rn, 01/19/2023 17:46, by Renu Fontaine     Radiology results  CT HEAD WO CONTRAST   Final Result   1.  No acute intracranial abnormality. Stable left-sided  shunt. No   ventriculomegaly. 2. Unremarkable CTA of the head and neck. CTA HEAD NECK W CONTRAST   Final Result   1. No acute intracranial abnormality. Stable left-sided  shunt. No   ventriculomegaly. 2. Unremarkable CTA of the head and neck. XR SHUNT SERIES PLACEMENT (<4 VIEWS)   Final Result   Unremarkable  shunt without appreciable catheter discontinuity. See discharge instructions for specific medications, discharge information, and treatments. They were verbally instructed to return to emergency if any problems. Medications   oxyCODONE-acetaminophen (PERCOCET) 5-325 MG per tablet 2 tablet (has no administration in time range)   0.9 % sodium chloride bolus (0 mLs IntraVENous Stopped 1/19/23 1918)   promethazine (PHENERGAN) 12.5mg in sodium chloride 0.9% 50 mL IVPB 12.5 mg (0 mg IntraVENous Stopped 1/19/23 1850)   magnesium sulfate 1000 mg in dextrose 5% 100 mL IVPB (0 mg IntraVENous Stopped 1/19/23 1948)   dexamethasone (DECADRON) injection 4 mg (4 mg IntraVENous Given 1/19/23 1817)   morphine (PF) injection 4 mg (4 mg IntraVENous Given 1/19/23 1816)   diphenhydrAMINE (BENADRYL) injection 25 mg (25 mg IntraVENous Given 1/19/23 1817)   iopamidol (ISOVUE-370) 76 % injection 75 mL (75 mLs IntraVENous Given 1/19/23 1839)       New Prescriptions    No medications on file       The patient's blood pressure was found to be elevated according to CMS/Medicare and the Affordable Care Act/ObamaCare criteria. Elevated blood pressure could occur because of pain or anxiety or other reasons and does not mean that they need to have their blood pressure treated or medications otherwise adjusted. However, this could also be a sign that they will need to have their blood pressure treated or medications changed. The patient was instructed to follow up closely with their personal physician to have their blood pressure rechecked.  The patient was instructed to take a list of recent blood pressure readings to their next visit with their personal physician. IMPRESSION(S):  1. Nonintractable episodic headache, unspecified headache type    2. S/P  shunt    3. Superficial burn of left foot, initial encounter      ?   Recheck Times: [id number] [facility],   01/19/23 8753

## 2023-01-19 NOTE — ED NOTES
Pt return from ct at this time, Pt resting in bed at this time, laying in a supine position with head of bed elevated . Call light remains in reach instructed pt how to use, and encouraged pt to call if needed assistance, no distress noted. RR even and unlabored, skin warm and dry. No needs at this time. Will continue to monitor closely.        Leena Caballero RN  01/19/23 6763

## 2023-01-19 NOTE — ED NOTES
Pt resting in bed at this time, laying in a supine position with head of bed elevated . Call light remains in reach instructed pt how to use, and encouraged pt to call if needed assistance, no distress noted. RR even and unlabored, skin warm and dry. No needs at this time. Will continue to monitor closely.        Madhavi Light  01/19/23 2930

## 2023-01-19 NOTE — ED PROVIDER NOTES
[address] [facility]        Pt Name: Kelley Patton  MRN: [id number]  [place] 1980  Date of evaluation: 1/19/2023  Provider: FREDRICK Lopez CNP  PCP: FREDRICK Castanon CNP  Note Started: 4:11 PM EST 1/19/23       I have seen and evaluated this patient with my supervising physician, Gretchen Liu. CHIEF COMPLAINT       Chief Complaint   Patient presents with    Foot Burn     Pt in via EMS from home after near syncopal episode resulting in boiling water to hit foot. Pt states that she has been feeling \"lightheaded for the last couple of days\". Pt alert and oriented with no signs of distress noted. Pt rates headache as a 9/10. HISTORY OF PRESENT ILLNESS: 1 or more Elements     History from : Patient    Limitations to history : None    Kelley Patton is a 38 y.o. nontoxic, well-appearing, mildly distressed female with medical history including, not limited to,  shunt placement, seizure disorder, and recent diagnosis of MS, who presents to the emergency department with a right foot burn status post she was [employer] and cheese\" and experienced a transient, approximately 10-minute period of complete vision loss, and spilled hot water on her left foot. Accompanying symptoms include nausea, lightheadedness, presyncope, and a fever of 101.0 yesterday. Patient states that she has been experiencing a headache described as \"sharp\" rated severity of 10/10 for the past 1 week. She states that this headache is not the worst headache of her life and was of gradual onset. It was nonexertional at onset. Patient endorses taking Percocet and Excedrin Migraine without relief of the headache. Nursing Notes were all reviewed and agreed with or any disagreements were addressed in the HPI. REVIEW OF SYSTEMS :      Review of Systems   Constitutional:  Positive for fever. Negative for chills, diaphoresis and fatigue.    HENT: Negative for congestion and sore throat. Eyes:  Positive for visual disturbance (Transient complete loss of vision). Negative for pain. Respiratory:  Negative for cough and shortness of breath. Cardiovascular:  Negative for chest pain and leg swelling. Gastrointestinal:  Positive for nausea. Negative for abdominal pain, anal bleeding and vomiting. Genitourinary:  Negative for difficulty urinating, dysuria, frequency and urgency. Musculoskeletal:  Negative for back pain, neck pain and neck stiffness. Skin:  Negative for rash and wound. Neurological:  Positive for light-headedness and headaches. Negative for dizziness and syncope (+ Presyncope). Positives and Pertinent negatives as per HPI.      SURGICAL HISTORY     Past Surgical History:   Procedure Laterality Date    ABDOMEN SURGERY N/A 2021    REMOVAL OF ABDOMINAL WALL MASS performed by Giselle Dougherty MD at [facility]      appendicitis     SECTION  2005    placenta previa    CHOLECYSTECTOMY      COLONOSCOPY  2004    COLPOSCOPY      CSF SHUNT      replaced at age 17    CYSTOSCOPY      stone removal    HEMORRHOID SURGERY      HERNIA REPAIR Bilateral     inguinal    HERNIA REPAIR      hiatal hernia    HYSTERECTOMY, TOTAL ABDOMINAL (CERVIX REMOVED)  2016    TAHBSO, adhesions/PCOS    KNEE ARTHROSCOPY Left 2015    medial meniscectomy, chondroplasty, plica resection    LITHOTRIPSY Bilateral 12/10/2019    OTHER SURGICAL HISTORY  10/19/2016    op lap    VENTRAL HERNIA REPAIR N/A 2022    LAPAROSCOPIC LYSIS OF ADHESIONS AND ABDOMINAL WALL MASS REMOVAL performed by Giselle Dougherty MD at [place] [address]      multiple revisions, most recent        CURRENTMEDICATIONS       Previous Medications    ALBUTEROL SULFATE HFA (VENTOLIN HFA) 108 (90 BASE) MCG/ACT INHALER    Inhale 2 puffs into the lungs 4 times daily as needed for Wheezing    DIAZEPAM (VALIUM) 2 MG TABLET Take 2 mg by mouth. GABAPENTIN (NEURONTIN) 400 MG CAPSULE    Take 400 mg by mouth 3 times daily. HYDROXYZINE HCL (ATARAX) 25 MG TABLET    Take 25 mg by mouth 3 times daily as needed for Itching    LACOSAMIDE (VIMPAT) 50 MG TABS TABLET    Take 1 tablet by mouth 2 times daily for 15 days. LEVETIRACETAM (KEPPRA) 500 MG TABLET    Take 2 tablets by mouth 2 times daily Do not take this medication at this time unless told by neurology to restart. LISDEXAMFETAMINE (VYVANSE) 50 MG CAPSULE    Take 1 capsule by mouth every morning for 7 days. ONDANSETRON (ZOFRAN ODT) 4 MG DISINTEGRATING TABLET    Take 1 tablet by mouth every 8 hours as needed for Nausea    OXYCODONE (ROXICODONE) 5 MG IMMEDIATE RELEASE TABLET    Take 5 mg by mouth every 6 hours as needed for Pain.     TAMSULOSIN (FLOMAX) 0.4 MG CAPSULE    Take 1 capsule by mouth daily for 5 days       ALLERGIES     Bee venom, Bentyl [dicyclomine hcl], Dicyclomine, Ketorolac tromethamine, Levofloxacin, Maitake, Shiitake mushroom, Sulfa antibiotics, Vancomycin, Zosyn [piperacillin sod-tazobactam so], Adhesive tape, Oxycodone-acetaminophen, Sulfacetamide, Zofran [ondansetron], Acetaminophen, Butalbital-apap-caff-cod, Ceftaroline, Daptomycin, Fosfomycin tromethamine, Haldol [haloperidol], Keppra [levetiracetam], Ketamine, Methocarbamol, Morphine, Nitrofurantoin, Prochlorperazine, Reglan [metoclopramide], Silicone, and Tazobactam    FAMILYHISTORY       Family History   Problem Relation Age of Onset    High Blood Pressure Mother     Diabetes Mother     High Cholesterol Mother     Depression Mother     Diabetes Maternal Grandmother     High Blood Pressure Maternal Grandmother     High Cholesterol Maternal Grandmother     Heart Disease Maternal Grandfather     High Blood Pressure Maternal Grandfather     Heart Disease Paternal Grandmother     Stroke Paternal Grandfather     Depression Sister     Cirrhosis Father     Rheum Arthritis Neg Hx     Osteoarthritis Neg Hx Asthma Neg Hx     Breast Cancer Neg Hx     Cancer Neg Hx     Heart Failure Neg Hx     Hypertension Neg Hx     Migraines Neg Hx     Ovarian Cancer Neg Hx     Rashes/Skin Problems Neg Hx     Seizures Neg Hx     Thyroid Disease Neg Hx         SOCIAL HISTORY       Social History     Tobacco Use    Smoking status: Every Day     Packs/day: 0.50     Years: 18.00     Pack years: 9.00     Types: Cigarettes    Smokeless tobacco: Never   Vaping Use    Vaping Use: Never used   Substance Use Topics    Alcohol use: No     Alcohol/week: 0.0 standard drinks    Drug use: Never       SCREENINGS        [place] Coma Scale  Eye Opening: Spontaneous  Best Verbal Response: Oriented  Best Motor Response: Obeys commands  [place] Coma Scale Score: 15                CIWA Assessment  BP: (!) 144/98  Heart Rate: (!) 104           PHYSICAL EXAM  1 or more Elements     ED Triage Vitals [01/19/23 1536]   BP Temp Temp Source Heart Rate Resp SpO2 Height Weight   (!) 144/98 98.4 °F (36.9 °C) Oral (!) 104 18 99 % -- --       Physical Exam  Vitals and nursing note reviewed. Constitutional:       General: She is not in acute distress. Appearance: Normal appearance. She is not ill-appearing, toxic-appearing or diaphoretic. HENT:      Head: Normocephalic and atraumatic. Right Ear: External ear normal.      Left Ear: External ear normal.      Nose: Nose normal.      Mouth/Throat:      Mouth: Mucous membranes are moist.   Eyes:      General:         Right eye: No discharge. Left eye: No discharge. Extraocular Movements: Extraocular movements intact. Pupils: Pupils are equal, round, and reactive to light. Cardiovascular:      Rate and Rhythm: Regular rhythm. Tachycardia present. Heart sounds: No murmur heard. No friction rub. No gallop. Pulmonary:      Effort: Pulmonary effort is normal. No respiratory distress. Breath sounds: No wheezing, rhonchi or rales. Abdominal:      Palpations: Abdomen is soft. Tenderness: There is no abdominal tenderness. There is no right CVA tenderness, left CVA tenderness or guarding. Musculoskeletal:         General: Normal range of motion. Cervical back: Normal range of motion and neck supple. No rigidity or tenderness. Lymphadenopathy:      Cervical: No cervical adenopathy. Skin:     General: Skin is warm and dry. Capillary Refill: Capillary refill takes less than 2 seconds. Findings: Erythema (+ Erythema noted to the dorsum of the left foot consistent with first-degree burn) present. No rash. Neurological:      Mental Status: She is alert and oriented to person, place, and time. Cranial Nerves: No cranial nerve deficit. Sensory: No sensory deficit. Motor: No weakness. Coordination: Coordination normal.      Gait: Gait normal.      Deep Tendon Reflexes: Reflexes normal.   Psychiatric:         Mood and Affect: Mood normal.         Behavior: Behavior normal.             DIAGNOSTIC RESULTS   LABS:    I have reviewed and interpreted all of the currently available lab results from this visit:  Results for orders placed or performed during the hospital encounter of 01/19/23   Culture, Blood 1    Specimen: Blood   Result Value Ref Range    Blood Culture, Routine       No Growth to date. Any change in status will be called. Culture, Blood 2    Specimen: Blood   Result Value Ref Range    Culture, Blood 2       No Growth to date. Any change in status will be called.    Urinalysis with Reflex to Culture    Specimen: Urine   Result Value Ref Range    Color, UA Yellow Straw/Yellow    Clarity, UA Clear Clear    Glucose, Ur Negative Negative mg/dL    Bilirubin Urine Negative Negative    Ketones, Urine Negative Negative mg/dL    Specific Gravity, UA 1.034 1.005 - 1.030    Blood, Urine Negative Negative    pH, UA 6.5 5.0 - 8.0    Protein, UA Negative Negative mg/dL    Urobilinogen, Urine 0.2 <2.0 E.U./dL    Nitrite, Urine Negative Negative    Leukocyte Esterase, Urine Negative Negative    Microscopic Examination Not Indicated     Urine Type NotGiven     Urine Reflex to Culture Not Indicated    Comprehensive Metabolic Panel w/ Reflex to MG   Result Value Ref Range    Sodium 138 136 - 145 mmol/L    Potassium reflex Magnesium 4.6 3.5 - 5.1 mmol/L    Chloride 103 99 - 110 mmol/L    CO2 19 (L) 21 - 32 mmol/L    Anion Gap 16 3 - 16    Glucose 104 (H) 70 - 99 mg/dL    BUN 14 7 - 20 mg/dL    Creatinine 0.6 0.6 - 1.1 mg/dL    Est, Glom Filt Rate >60 >60    Calcium 9.7 8.3 - 10.6 mg/dL    Total Protein 7.7 6.4 - 8.2 g/dL    Albumin 4.1 3.4 - 5.0 g/dL    Albumin/Globulin Ratio 1.1 1.1 - 2.2    Total Bilirubin 0.3 0.0 - 1.0 mg/dL    Alkaline Phosphatase 115 40 - 129 U/L    ALT 11 10 - 40 U/L    AST 14 (L) 15 - 37 U/L   Lactic Acid   Result Value Ref Range    Lactic Acid 2.0 0.4 - 2.0 mmol/L   Protime-INR   Result Value Ref Range    Protime 12.8 11.7 - 14.5 sec    INR 0.97 0.87 - 1.14   APTT   Result Value Ref Range    aPTT 31.0 23.0 - 34.3 sec   SPECIMEN REJECTION   Result Value Ref Range    Rejected Test cbcwd     Reason for Rejection see below    CBC with Auto Differential   Result Value Ref Range    WBC 12.0 (H) 4.0 - 11.0 K/uL    RBC 4.97 4.00 - 5.20 M/uL    Hemoglobin 14.1 12.0 - 16.0 g/dL    Hematocrit 42.9 36.0 - 48.0 %    MCV 86.4 80.0 - 100.0 fL    MCH 28.5 26.0 - 34.0 pg    MCHC 33.0 31.0 - 36.0 g/dL    RDW 15.9 (H) 12.4 - 15.4 %    Platelets 055 376 - 830 K/uL    MPV 7.9 5.0 - 10.5 fL    Neutrophils % 79.3 %    Lymphocytes % 16.5 %    Monocytes % 2.9 %    Eosinophils % 0.6 %    Basophils % 0.7 %    Neutrophils Absolute 9.5 (H) 1.7 - 7.7 K/uL    Lymphocytes Absolute 2.0 1.0 - 5.1 K/uL    Monocytes Absolute 0.4 0.0 - 1.3 K/uL    Eosinophils Absolute 0.1 0.0 - 0.6 K/uL    Basophils Absolute 0.1 0.0 - 0.2 K/uL         When ordered only abnormal lab results are displayed. All other labs were within normal range or not returned as of this dictation. EKG:  When ordered, EKG's are interpreted by the Emergency Department Physician in the absence of a cardiologist.  Please see their note for interpretation of EKG. RADIOLOGY:   Non-plain film images such as CT, Ultrasound and MRI are read by the radiologist. Plain radiographic images are visualized and preliminarily interpreted by the ED Provider with the below findings:      Interpretation per the Radiologist below, if available at the time of this note:    CT HEAD WO CONTRAST    Result Date: 1/19/2023  1. No acute intracranial abnormality. Stable left-sided  shunt. No ventriculomegaly. 2. Unremarkable CTA of the head and neck. CTA HEAD NECK W CONTRAST    Result Date: 1/19/2023  1. No acute intracranial abnormality. Stable left-sided  shunt. No ventriculomegaly. 2. Unremarkable CTA of the head and neck. XR SHUNT SERIES PLACEMENT (<4 VIEWS)    Result Date: 1/19/2023  Unremarkable  shunt without appreciable catheter discontinuity. CT HEAD WO CONTRAST    Result Date: 1/17/2023  Site: [place] [place] #: [id number] #: [id number]: GSWREDAccount #: [de-identified] #: [id number] #: [id number]: CT HEAD WO CONTRASTExam Date/Time: 01/17/2023 09:10 PMAdmitting Diagnosis: Head trauma, mod-severeReason for Exam: Head trauma, mod-severe Dictated by: Richie Gaitan CANDE: 01/17/2023 09:59 PMT: This document is confidential medical information. Unauthorized disclosure or use of this information is prohibited by law. If you are not the intended recipient of this document, please advise  us by calling immediately [phone]. Impression/Conclusion below HISTORY:   Head trauma, mod-severe seizures COMPARISON:  CT head December 9, 2022 TECHNIQUE:  Noncontrast multiplanar CT images of the head NOTE:  If there are questions about the content of this report, please contact [address] [place] [address] radiology by calling [phone] FINDINGS: BRAIN PARENCHYMA: No evidence intracranial hemorrhage or mass effect. Left frontal approach intraventricular catheter is again seen with tip near the foramen lobe, unchanged. VENTRICLES/SULCI: Ventricles are slitlike, similar in appearance. No hydronephrosis. EXTRA-AXIAL SPACES:  Unremarkable PARANASAL SINUSES/MASTOIDS: Unremarkable BONES:  [Remote postsurgical changes right frontal bone OTHER: None IMPRESSION: Stable appearance of the left frontal approach intraventricular catheter. No hydrocephalus or acute intracranial abnormality. SIGNED BY: Raul Salazar MD on 1/17/2023  9:56 PM   [facility] [phone] -  2011 [facility]: [phone]             PROCEDURES   Unless otherwise noted below, none     Procedures    CRITICAL CARE TIME (.cctime)   0 minutes    PAST MEDICAL HISTORY     Ms. Flakita Garza has a past medical history of ADHD (attention deficit hyperactivity disorder) (1988), Depression with anxiety, Diabetes mellitus ([address] Difficult intubation, Encounter for imaging to screen for metal prior to MRI (06/01/2021), ESBL (extended spectrum beta-lactamase) producing bacteria infection (11/06/2019), Functional ovarian cysts (2008), Headache(784.0), History of blood transfusion, History of kidney stones, History of PCOS, Hydrocephalus ([address] Hyperlipidemia, Irritable bowel syndrome (2004), Meningitis (74/3193), Neutrophilic leukocytosis, Nicotine dependence, PONV (postoperative nausea and vomiting), Primary osteoarthritis of left knee (07/01/2016), S/P cone biopsy of cervix (2004), Scoliosis (1990.s), Seizures ([address]  (ventriculoperitoneal) shunt status (1982), and Wears glasses.      Chronic Conditions affecting Care: None    EMERGENCY DEPARTMENT COURSE and DIFFERENTIAL DIAGNOSIS/MDM:   Vitals:    Vitals:    01/19/23 1536   BP: (!) 144/98   Pulse: (!) 104   Resp: 18   Temp: 98.4 °F (36.9 °C)   TempSrc: Oral   SpO2: 99%     Alternate diagnoses were considered less likely based on history and physical.  Considered CVA, TIA, amaurosis fugax, atypical migraine, seizures, other        Fly Fu is a 38 y.o. nontoxic, well-appearing, mildly distressed female with medical history including, not limited to,  shunt placement, seizure disorder, and recent diagnosis of MS, who presents to the emergency department with a right foot burn status post she was [employer] and cheese\" and experienced a transient, approximately 10-minute period of complete vision loss, and spilled hot water on her left foot. Accompanying symptoms include nausea, lightheadedness, presyncope, and a fever of 101.0 yesterday. Patient states that she has been experiencing a headache described as \"sharp\" rated severity of 10/10 for the past 1 week. She states that this headache is not the worst headache of her life and was of gradual onset. It was nonexertional at onset. Patient endorses taking Percocet and Excedrin Migraine without relief of the headache. Obtain urine reflexive culture, blood cultures 1 and 2, APTT, pro time INR, CBC, lactic acid, CMP, CT head: CTA head and neck, and XR of  shunt series. Work-up pending. Patient medicated as below.      Medications   0.9 % sodium chloride bolus (0 mLs IntraVENous Stopped 1/19/23 1918)   promethazine (PHENERGAN) 12.5mg in sodium chloride 0.9% 50 mL IVPB 12.5 mg (0 mg IntraVENous Stopped 1/19/23 1850)   magnesium sulfate 1000 mg in dextrose 5% 100 mL IVPB (0 mg IntraVENous Stopped 1/19/23 1948)   dexamethasone (DECADRON) injection 4 mg (4 mg IntraVENous Given 1/19/23 1817)   morphine (PF) injection 4 mg (4 mg IntraVENous Given 1/19/23 1816)   diphenhydrAMINE (BENADRYL) injection 25 mg (25 mg IntraVENous Given 1/19/23 1817)   iopamidol (ISOVUE-370) 76 % injection 75 mL (75 mLs IntraVENous Given 1/19/23 1839)   oxyCODONE-acetaminophen (PERCOCET) 5-325 MG per tablet 2 tablet (2 tablets Oral Given 1/19/23 2232)     Work-up reveals:  Urine Fluxid culture: No UTI  Blood cultures #1 and 2 pending  Pro time INR: 12.8 and 0.97 respectively, APTT: [email or web address]  CBC: Mild Natalianus@[email or web address] without anemia, RDW of 15.9, neutrophil absolute elevated 9.5, otherwise unremarkable  Metabolic panel: No electrolyte derangement, CO2 reduced at 19, hyperglycemic at 104 mg/dL, no renal dysfunction, or elevation LFTs  Lactic acid: [email or web address]  CT head: As noted above did not find no acute intracranial normalities. Stable left-sided  shunt. No ventriculomegaly. Unremarkable CTA of head and neck  CT head and neck: As noted above within defined no acute intracranial abnormality. Stable left sided  shunt. No ventral ventriculomegaly. Unremarkable CTA of head and neck  XR shunt series placement: As noted above identified unremarkable  shunt without appreciable catheter discontinuity    Clinical Impression:    ICD-10-CM    1. Nonintractable episodic headache, unspecified headache type  R51.9       2. S/P  shunt  Z98.2       3. Superficial burn of left foot, initial encounter  T25.122A                 Hospitalist: Consulted with the hospitalist for admission. Discussed patients HPI, ED work-up, results, treatment, and response with my attending physician Dr. Dr. Maria M Bueno and the Hospitalist -Dr. Claudette Petit who recommends discussion with [facility] neurology regarding transfer for admission as patient's  shunt was placed there and there is some concern for  shunt malfunction as the shunt Does not appropriately depressed when pressed. Per hospitalist if you see does accept the patient for transfer for admission then consult with [facility] neurosurgery to see if they would like the patient transferred there for admission and if they do not he will admit the patient. Consult was placed to [facility] neurosurgery.     Shift report was given to [address] [facility] who will speak with [facility] neurosurgery and is aware of the need to speak with  neurosurgery and potentially with [facility] neurosurgery if they decline transfer for admission and that if [facility] neurosurgery declines admission then Dr. Violeta Fisher will admit the patient. Please see note from [address] [facility] for further MDM and disposition details. Is this patient to be included in the SEP-1 Core Measure due to severe sepsis or septic shock? No   Exclusion criteria - the patient is NOT to be included for SEP-1 Core Measure due to:   Infection is not suspected                  FREDRICK Santos - CNP  01/22/23 0111

## 2023-01-19 NOTE — ED NOTES
Pt ambulated to the bathroom with a steady gait, no c/o pain      Edilia Shirjocelynn, SHANA  01/19/23 2569

## 2023-01-20 LAB
BLOOD CULTURE, ROUTINE: NORMAL
CULTURE, BLOOD 2: NORMAL

## 2023-01-20 NOTE — PROGRESS NOTES
Medication Reconciliation     List of medications patient is currently taking is complete. Source of information:   1. Conversation with patient at bedside  2. EPIC records        Notes regarding home medications:  1. Patient received some of her home medications today PTA. 2. Patient reports that she takes Percocet BID and Valium - requesting to have this continued while admitted. Reports that she can't take Atarax bc it \"does weird things\". Removed allergy to Percocet from chart - no reaction from taking this. 3. Also requesting IV antiemetics. .. reports that she can't take Zofran. Denies any other OTC/herbal medications.      Armida Alejandro, Pharmacy Intern

## 2023-01-20 NOTE — DISCHARGE INSTRUCTIONS
Please continue your present treatments and medications until you receive further instructions from your healthcare professional. Take all your medications as prescribed unless a healthcare professional instructs you to do otherwise.

## 2023-01-22 ASSESSMENT — ENCOUNTER SYMPTOMS
SHORTNESS OF BREATH: 0
EYE PAIN: 0
BACK PAIN: 0
SORE THROAT: 0
COUGH: 0
VOMITING: 0
ANAL BLEEDING: 0
NAUSEA: 1
ABDOMINAL PAIN: 0

## 2023-01-30 ENCOUNTER — OFFICE VISIT (OUTPATIENT)
Dept: GYNECOLOGY | Age: 43
End: 2023-01-30
Payer: COMMERCIAL

## 2023-01-30 VITALS
SYSTOLIC BLOOD PRESSURE: 130 MMHG | HEIGHT: 59 IN | WEIGHT: 156.3 LBS | BODY MASS INDEX: 31.51 KG/M2 | DIASTOLIC BLOOD PRESSURE: 86 MMHG

## 2023-01-30 DIAGNOSIS — R87.622 LGSIL PAP SMEAR OF VAGINA: Primary | ICD-10-CM

## 2023-01-30 DIAGNOSIS — R30.0 DYSURIA: ICD-10-CM

## 2023-01-30 LAB
BILIRUBIN, POC: NORMAL
BLOOD URINE, POC: NORMAL
CLARITY, POC: NORMAL
COLOR, POC: YELLOW
GLUCOSE URINE, POC: NORMAL
KETONES, POC: NORMAL
LEUKOCYTE EST, POC: NORMAL
NITRITE, POC: POSITIVE
PH, POC: 5.5
PROTEIN, POC: NORMAL
SPECIFIC GRAVITY, POC: >=1.03
UROBILINOGEN, POC: 0.2

## 2023-01-30 PROCEDURE — 81002 URINALYSIS NONAUTO W/O SCOPE: CPT | Performed by: OBSTETRICS & GYNECOLOGY

## 2023-01-30 PROCEDURE — G8417 CALC BMI ABV UP PARAM F/U: HCPCS | Performed by: OBSTETRICS & GYNECOLOGY

## 2023-01-30 PROCEDURE — 3075F SYST BP GE 130 - 139MM HG: CPT | Performed by: OBSTETRICS & GYNECOLOGY

## 2023-01-30 PROCEDURE — 3079F DIAST BP 80-89 MM HG: CPT | Performed by: OBSTETRICS & GYNECOLOGY

## 2023-01-30 PROCEDURE — G8484 FLU IMMUNIZE NO ADMIN: HCPCS | Performed by: OBSTETRICS & GYNECOLOGY

## 2023-01-30 PROCEDURE — G8427 DOCREV CUR MEDS BY ELIG CLIN: HCPCS | Performed by: OBSTETRICS & GYNECOLOGY

## 2023-01-30 PROCEDURE — 99213 OFFICE O/P EST LOW 20 MIN: CPT | Performed by: OBSTETRICS & GYNECOLOGY

## 2023-01-30 PROCEDURE — 4004F PT TOBACCO SCREEN RCVD TLK: CPT | Performed by: OBSTETRICS & GYNECOLOGY

## 2023-01-30 NOTE — PROGRESS NOTES
Subjective:      Patient ID: Luisa Jefferson is a 38 y.o. female. HPI  pts here for repeat pap. She is being treated now for MS. Thinks she may have a bladder infection. Notes some burning with urination. Review of Systems Pertinent review of systems items discussed above. All others systems items not discussed above were negative. Objective:   Physical Exam    Af, vss  Ext- no lesions  Meatus- no lesions  Bladder- good support  Vag- no d/c, good support, cuff without lesions  Cx- absent  Pap    Assessment:   H/o LGSIL, dysuria     Plan:   Call with results. Check UA. F/u pap in four months.        Giovana Porter MD

## 2023-01-31 ENCOUNTER — HOSPITAL ENCOUNTER (EMERGENCY)
Age: 43
Discharge: HOME | DRG: 251 | End: 2023-01-31
Payer: COMMERCIAL

## 2023-01-31 ENCOUNTER — APPOINTMENT (OUTPATIENT)
Dept: CT IMAGING | Age: 43
DRG: 251 | End: 2023-01-31
Payer: COMMERCIAL

## 2023-01-31 VITALS
RESPIRATION RATE: 18 BRPM | BODY MASS INDEX: 32.27 KG/M2 | OXYGEN SATURATION: 98 % | TEMPERATURE: 97.9 F | DIASTOLIC BLOOD PRESSURE: 90 MMHG | WEIGHT: 160.05 LBS | SYSTOLIC BLOOD PRESSURE: 128 MMHG | HEIGHT: 59 IN | HEART RATE: 67 BPM

## 2023-01-31 DIAGNOSIS — R56.9 SEIZURE (HCC): ICD-10-CM

## 2023-01-31 DIAGNOSIS — N39.0 URINARY TRACT INFECTION WITHOUT HEMATURIA, SITE UNSPECIFIED: Primary | ICD-10-CM

## 2023-01-31 DIAGNOSIS — R30.0 DYSURIA: ICD-10-CM

## 2023-01-31 LAB
A/G RATIO: 1.3 (ref 1.1–2.2)
ACETAMINOPHEN LEVEL: <5 UG/ML (ref 10–30)
ALBUMIN SERPL-MCNC: 4.3 G/DL (ref 3.4–5)
ALP BLD-CCNC: 139 U/L (ref 40–129)
ALT SERPL-CCNC: 49 U/L (ref 10–40)
AMPHETAMINE SCREEN, URINE: ABNORMAL
ANION GAP SERPL CALCULATED.3IONS-SCNC: 16 MMOL/L (ref 3–16)
AST SERPL-CCNC: 18 U/L (ref 15–37)
BACTERIA: ABNORMAL /HPF
BARBITURATE SCREEN URINE: ABNORMAL
BASOPHILS ABSOLUTE: 0 K/UL (ref 0–0.2)
BASOPHILS RELATIVE PERCENT: 0.7 %
BENZODIAZEPINE SCREEN, URINE: ABNORMAL
BILIRUB SERPL-MCNC: 0.4 MG/DL (ref 0–1)
BILIRUBIN URINE: NEGATIVE
BLOOD, URINE: NEGATIVE
BUN BLDV-MCNC: 10 MG/DL (ref 7–20)
CALCIUM SERPL-MCNC: 10.1 MG/DL (ref 8.3–10.6)
CANNABINOID SCREEN URINE: ABNORMAL
CHLORIDE BLD-SCNC: 102 MMOL/L (ref 99–110)
CLARITY: CLEAR
CO2: 21 MMOL/L (ref 21–32)
COCAINE METABOLITE SCREEN URINE: ABNORMAL
COLOR: YELLOW
CREAT SERPL-MCNC: 0.6 MG/DL (ref 0.6–1.1)
EOSINOPHILS ABSOLUTE: 0.1 K/UL (ref 0–0.6)
EOSINOPHILS RELATIVE PERCENT: 1 %
EPITHELIAL CELLS, UA: 3 /HPF (ref 0–5)
ETHANOL: NORMAL MG/DL (ref 0–0.08)
FENTANYL SCREEN, URINE: ABNORMAL
GFR SERPL CREATININE-BSD FRML MDRD: >60 ML/MIN/{1.73_M2}
GLUCOSE BLD-MCNC: 140 MG/DL (ref 70–99)
GLUCOSE URINE: NEGATIVE MG/DL
HCT VFR BLD CALC: 44.8 % (ref 36–48)
HEMOGLOBIN: 15.1 G/DL (ref 12–16)
HYALINE CASTS: 0 /LPF (ref 0–8)
KETONES, URINE: NEGATIVE MG/DL
LACTIC ACID: 1.3 MMOL/L (ref 0.4–2)
LEUKOCYTE ESTERASE, URINE: NEGATIVE
LYMPHOCYTES ABSOLUTE: 1.4 K/UL (ref 1–5.1)
LYMPHOCYTES RELATIVE PERCENT: 20.3 %
Lab: ABNORMAL
MCH RBC QN AUTO: 28.6 PG (ref 26–34)
MCHC RBC AUTO-ENTMCNC: 33.8 G/DL (ref 31–36)
MCV RBC AUTO: 84.6 FL (ref 80–100)
METHADONE SCREEN, URINE: ABNORMAL
MICROSCOPIC EXAMINATION: YES
MONOCYTES ABSOLUTE: 0.3 K/UL (ref 0–1.3)
MONOCYTES RELATIVE PERCENT: 4.6 %
NEUTROPHILS ABSOLUTE: 5.1 K/UL (ref 1.7–7.7)
NEUTROPHILS RELATIVE PERCENT: 73.4 %
NITRITE, URINE: POSITIVE
OPIATE SCREEN URINE: ABNORMAL
OXYCODONE URINE: POSITIVE
PDW BLD-RTO: 16.7 % (ref 12.4–15.4)
PH UA: 7.5
PH UA: 7.5 (ref 5–8)
PHENCYCLIDINE SCREEN URINE: ABNORMAL
PLATELET # BLD: 298 K/UL (ref 135–450)
PMV BLD AUTO: 7.7 FL (ref 5–10.5)
POTASSIUM SERPL-SCNC: 3.8 MMOL/L (ref 3.5–5.1)
PROTEIN UA: NEGATIVE MG/DL
RBC # BLD: 5.3 M/UL (ref 4–5.2)
RBC UA: 0 /HPF (ref 0–4)
SALICYLATE, SERUM: <0.3 MG/DL (ref 15–30)
SODIUM BLD-SCNC: 139 MMOL/L (ref 136–145)
SPECIFIC GRAVITY UA: 1 (ref 1–1.03)
TOTAL PROTEIN: 7.6 G/DL (ref 6.4–8.2)
TROPONIN: <0.01 NG/ML
URINE REFLEX TO CULTURE: ABNORMAL
URINE TYPE: ABNORMAL
UROBILINOGEN, URINE: 0.2 E.U./DL
WBC # BLD: 6.9 K/UL (ref 4–11)
WBC UA: 4 /HPF (ref 0–5)

## 2023-01-31 PROCEDURE — 82077 ASSAY SPEC XCP UR&BREATH IA: CPT

## 2023-01-31 PROCEDURE — 80053 COMPREHEN METABOLIC PANEL: CPT

## 2023-01-31 PROCEDURE — 83605 ASSAY OF LACTIC ACID: CPT

## 2023-01-31 PROCEDURE — 81001 URINALYSIS AUTO W/SCOPE: CPT

## 2023-01-31 PROCEDURE — 74177 CT ABD & PELVIS W/CONTRAST: CPT

## 2023-01-31 PROCEDURE — 80179 DRUG ASSAY SALICYLATE: CPT

## 2023-01-31 PROCEDURE — 99285 EMERGENCY DEPT VISIT HI MDM: CPT

## 2023-01-31 PROCEDURE — 80307 DRUG TEST PRSMV CHEM ANLYZR: CPT

## 2023-01-31 PROCEDURE — 6360000002 HC RX W HCPCS: Performed by: PHYSICIAN ASSISTANT

## 2023-01-31 PROCEDURE — 96375 TX/PRO/DX INJ NEW DRUG ADDON: CPT

## 2023-01-31 PROCEDURE — 80143 DRUG ASSAY ACETAMINOPHEN: CPT

## 2023-01-31 PROCEDURE — 96365 THER/PROPH/DIAG IV INF INIT: CPT

## 2023-01-31 PROCEDURE — 84484 ASSAY OF TROPONIN QUANT: CPT

## 2023-01-31 PROCEDURE — 70450 CT HEAD/BRAIN W/O DYE: CPT

## 2023-01-31 PROCEDURE — 6360000004 HC RX CONTRAST MEDICATION: Performed by: PHYSICIAN ASSISTANT

## 2023-01-31 PROCEDURE — 85025 COMPLETE CBC W/AUTO DIFF WBC: CPT

## 2023-01-31 RX ORDER — OXYCODONE HYDROCHLORIDE AND ACETAMINOPHEN 5; 325 MG/1; MG/1
1 TABLET ORAL EVERY 6 HOURS PRN
Qty: 10 TABLET | Refills: 0 | Status: SHIPPED | OUTPATIENT
Start: 2023-01-31 | End: 2023-02-03

## 2023-01-31 RX ORDER — DIPHENHYDRAMINE HCL 25 MG
25 TABLET ORAL EVERY 8 HOURS PRN
Qty: 20 TABLET | Refills: 0 | Status: SHIPPED | OUTPATIENT
Start: 2023-01-31 | End: 2023-03-02

## 2023-01-31 RX ORDER — NITROFURANTOIN 25; 75 MG/1; MG/1
100 CAPSULE ORAL 2 TIMES DAILY
Qty: 10 CAPSULE | Refills: 0 | Status: SHIPPED | OUTPATIENT
Start: 2023-01-31 | End: 2023-02-05

## 2023-01-31 RX ADMIN — IOPAMIDOL 75 ML: 755 INJECTION, SOLUTION INTRAVENOUS at 12:25

## 2023-01-31 RX ADMIN — HYDROMORPHONE HYDROCHLORIDE 1 MG: 1 INJECTION, SOLUTION INTRAMUSCULAR; INTRAVENOUS; SUBCUTANEOUS at 13:03

## 2023-01-31 RX ADMIN — Medication 25 MG: at 13:18

## 2023-01-31 ASSESSMENT — PAIN DESCRIPTION - DESCRIPTORS: DESCRIPTORS: SHARP

## 2023-01-31 ASSESSMENT — PAIN DESCRIPTION - LOCATION
LOCATION: BACK
LOCATION: BACK;ABDOMEN

## 2023-01-31 ASSESSMENT — PAIN DESCRIPTION - PAIN TYPE: TYPE: ACUTE PAIN

## 2023-01-31 ASSESSMENT — PAIN DESCRIPTION - FREQUENCY: FREQUENCY: CONTINUOUS

## 2023-01-31 ASSESSMENT — PAIN SCALES - GENERAL
PAINLEVEL_OUTOF10: 10
PAINLEVEL_OUTOF10: 10
PAINLEVEL_OUTOF10: 3

## 2023-01-31 ASSESSMENT — PAIN DESCRIPTION - ORIENTATION: ORIENTATION: RIGHT

## 2023-01-31 ASSESSMENT — LIFESTYLE VARIABLES: HOW OFTEN DO YOU HAVE A DRINK CONTAINING ALCOHOL: NEVER

## 2023-01-31 NOTE — ED NOTES
Discharge and education instructions reviewed. Patient verbalized understanding, teach-back successful. Patient denied questions at this time. No acute distress noted. Patient instructed to follow-up as noted - return to emergency department if symptoms worsen. Patient verbalized understanding. Discharged per EDMD with discharged instructions.      Molly Dewey RN  01/31/23 6415

## 2023-01-31 NOTE — ED TRIAGE NOTES
Pt has a extensive history of seizures. She had a mild seizure an hour ago and feels she could have another. She says it was mild. Pt has had her bladder hurting for a month. She is having sharp right flank pain starting at 5am and believes it could be an infection or a kidney stone.  She has a recent history of UTI and has been vomiting starting @5 am.

## 2023-01-31 NOTE — DISCHARGE INSTRUCTIONS
Follow-up with Dr. Matilde Clayton your primary care provider as needed  Take prescribed medication as prescribed only for pain. As well as the antibiotic make sure you complete the whole 5-day course. Keep your appointment with your neurologist for your seizures. Make sure you do not miss any of your seizure medication.

## 2023-01-31 NOTE — ED NOTES
Patient is having redness/rash between her breast. Notified provider ER CRISPIN Gonzalez.      Tigre Christianson RN  01/31/23 6120

## 2023-01-31 NOTE — ED PROVIDER NOTES
**ADVANCED PRACTICE PROVIDER, I HAVE EVALUATED THIS PATIENT**        [address] [facility] ENCOUNTER      Pt Name: Farrukh Whitaker  GR  [place] 1980  Date of evaluation: 2023  Provider: Natalee Terry PA-C  Note Started: 1:48 PM EST 2/3/2023        Chief Complaint:    Chief Complaint   Patient presents with    Seizures     Pt has a extensive history of seizures. She had a mild seizure an hour ago and feels she could have another. She says it was mild. Back Pain     Pt has had her bladder hurting for a month. She is having sharp right flank pain starting at 5am and believes it could be an infection or a kidney stone. She has a recent history of UTI and has been vomiting starting @5 am.          Nursing Notes, Past Medical Hx, Past Surgical Hx, Social Hx, Allergies, and Family Hx were all reviewed and agreed with or any disagreements were addressed in the HPI.    HPI: (Location, Duration, Timing, Severity, Quality, Assoc Sx, Context, Modifying factors)    History From: Patient      Chief Complaint of seizure. Also back pain x1 month. Thinks he may have a UTI or kidney stone. This is a  38 y.o. female who presents    To the emergency room with complaint of seizure. Patient has a history of seizures. And she is placed on Vimpat. She denies headache at this time. She is fully alert. Denies biting her tongue. Denies chest pain, some nausea noted. No vomiting. Denies fever. No lightheaded or dizziness. She states she does not know what normally triggers her seizures. No other complaints.     PastMedical/Surgical History:      Diagnosis Date    ADHD (attention deficit hyperactivity disorder)     Depression with anxiety     Diabetes mellitus ([facility] [address]     pre-diabetes    Difficult intubation     airway swelled up    Encounter for imaging to screen for metal prior to MRI 2021    MRI Conditional Medtronic Non-Programmable shunt model#[id number] implanted 10/30/2020 at [facility]. Normal Mode. 1.5T or 3.0T. ESBL (extended spectrum beta-lactamase) producing bacteria infection 2019    urine    Functional ovarian cysts 2008    rt ovary cyst x 2 yrs.     Headache(784.0)     migraines    History of blood transfusion     at birth    History of kidney stones     History of PCOS     had hysterectomy in 2016    Hydrocephalus ([address]     Hyperlipidemia     Irritable bowel syndrome 2004    had colonoscopy about 6 yrs ago    Meningitis     Neutrophilic leukocytosis     Nicotine dependence     PONV (postoperative nausea and vomiting)     very nauseated and sometimes wakes up with a Migraine--happened once after brain surgery    Primary osteoarthritis of left knee 2016    S/P cone biopsy of cervix 2004    Scoliosis .s    Seizures ([address]     twice--last one in 2022     (ventriculoperitoneal) shunt status     hydrocephalus f/w Neurosurgeon at [facility]    Wears glasses     reading         Procedure Laterality Date    ABDOMEN SURGERY N/A 2021    REMOVAL OF ABDOMINAL WALL MASS performed by Tom Murguia MD at [facility]      appendicitis     SECTION  2005    placenta previa    CHOLECYSTECTOMY      COLONOSCOPY  2004    COLPOSCOPY      CSF SHUNT      replaced at age 17    CYSTOSCOPY      stone removal    HEMORRHOID SURGERY      HERNIA REPAIR Bilateral     inguinal    HERNIA REPAIR      hiatal hernia    HYSTERECTOMY, TOTAL ABDOMINAL (CERVIX REMOVED)  2016    TAHBSO, adhesions/PCOS    KNEE ARTHROSCOPY Left 2015    medial meniscectomy, chondroplasty, plica resection    LITHOTRIPSY Bilateral 12/10/2019    OTHER SURGICAL HISTORY  10/19/2016    op lap    VENTRAL HERNIA REPAIR N/A 2022    LAPAROSCOPIC LYSIS OF ADHESIONS AND ABDOMINAL WALL MASS REMOVAL performed by Tom Murguia MD at [place] [address]      multiple revisions, most recent  Medications:  Discharge Medication List as of 1/31/2023  2:25 PM        CONTINUE these medications which have NOT CHANGED    Details   !! oxyCODONE-acetaminophen (PERCOCET) 5-325 MG per tablet Take 1 tablet by mouth 2 times daily as needed. Historical Med      !! diphenhydrAMINE (BENADRYL) 25 MG tablet Take 50 mg by mouth at bedtimeHistorical Med      lacosamide (VIMPAT) 50 MG TABS tablet Take 1 tablet by mouth 2 times daily for 15 days. , Disp-30 tablet, R-0Print      diazePAM (VALIUM) 2 MG tablet Take 2 mg by mouth nightly as needed. Historical Med      gabapentin (NEURONTIN) 400 MG capsule Take 400 mg by mouth 3 times daily. Historical Med       !! - Potential duplicate medications found. Please discuss with provider. Review of Systems:  (1 systems needed)  Review of Systems   Constitutional:  Negative for chills and fever. HENT:  Negative for congestion and sore throat. Eyes:  Negative for pain and visual disturbance. Respiratory:  Negative for cough and shortness of breath. Cardiovascular:  Negative for chest pain and leg swelling. Gastrointestinal:  Negative for abdominal pain, nausea and vomiting. Genitourinary:  Positive for dysuria. Negative for frequency, pelvic pain, vaginal discharge and vaginal pain. Musculoskeletal:  Negative for back pain and neck pain. Skin:  Negative for rash and wound. Neurological:  Positive for seizures. Negative for dizziness and light-headedness. \"Positives and Pertinent negatives as per HPI\"    Physical Exam:  Physical Exam  Vitals and nursing note reviewed. Constitutional:       Appearance: She is well-developed. She is not diaphoretic. HENT:      Head: Normocephalic and atraumatic. Nose: Nose normal.      Mouth/Throat:      Mouth: Mucous membranes are moist.      Pharynx: Oropharynx is clear. No oropharyngeal exudate or posterior oropharyngeal erythema. Eyes:      General: No scleral icterus. Right eye: No discharge. Left eye: No discharge. Extraocular Movements: Extraocular movements intact. Conjunctiva/sclera: Conjunctivae normal.      Pupils: Pupils are equal, round, and reactive to light. Neck:      Comments: No nuchal rigidity  Cardiovascular:      Rate and Rhythm: Normal rate and regular rhythm. Heart sounds: Normal heart sounds. No murmur heard. No friction rub. No gallop. Pulmonary:      Effort: Pulmonary effort is normal. No respiratory distress. Breath sounds: Normal breath sounds. No wheezing or rales. Chest:      Chest wall: No tenderness. Abdominal:      General: Abdomen is flat. Bowel sounds are normal. There is no distension. Palpations: Abdomen is soft. There is no mass. Tenderness: There is abdominal tenderness in the suprapubic area. There is no guarding or rebound. Musculoskeletal:         General: Normal range of motion. Cervical back: Normal range of motion and neck supple. Skin:     General: Skin is warm and dry. Neurological:      General: No focal deficit present. Mental Status: She is alert and oriented to person, place, and time. She is not disoriented. GCS: GCS eye subscore is 4. GCS verbal subscore is 5. GCS motor subscore is 6. Cranial Nerves: No cranial nerve deficit. Sensory: Sensation is intact. No sensory deficit. Motor: Motor function is intact. No tremor, abnormal muscle tone or seizure activity. Coordination: Coordination is intact. Coordination normal.      Gait: Gait is intact.       Comments: Finger to nose normal   Psychiatric:         Behavior: Behavior normal.       MEDICAL DECISION MAKING    Vitals:    Vitals:    01/31/23 1130 01/31/23 1230 01/31/23 1303 01/31/23 1345   BP: (!) 130/97 (!) 153/75 123/65 (!) 128/90   Pulse: (!) 102 92 82 67   Resp: 22 29 10 18   Temp:       TempSrc:       SpO2: 100% 100% 100% 98%   Weight:       Height:           LABS:  Labs Reviewed   CBC WITH AUTO DIFFERENTIAL - Abnormal; Notable for the following components:       Result Value    RBC 5.30 (*)     RDW 16.7 (*)     All other components within normal limits   COMPREHENSIVE METABOLIC PANEL - Abnormal; Notable for the following components:    Glucose 140 (*)     Alkaline Phosphatase 139 (*)     ALT 49 (*)     All other components within normal limits   URINALYSIS WITH REFLEX TO CULTURE - Abnormal; Notable for the following components:    Nitrite, Urine POSITIVE (*)     All other components within normal limits   URINE DRUG SCREEN - Abnormal; Notable for the following components:    Oxycodone Urine POSITIVE (*)     All other components within normal limits   SALICYLATE LEVEL - Abnormal; Notable for the following components:    Salicylate, Serum <9.9 (*)     All other components within normal limits   ACETAMINOPHEN LEVEL - Abnormal; Notable for the following components:    Acetaminophen Level <5 (*)     All other components within normal limits   MICROSCOPIC URINALYSIS - Abnormal; Notable for the following components:    Bacteria, UA 4+ (*)     All other components within normal limits   TROPONIN   LACTIC ACID   ETHANOL        Remainder of labs reviewed and were negative at this time or not returned at the time of this note. RADIOLOGY:   Non-plain film images such as CT, Ultrasound and MRI are read by the radiologist. Monica Desir PA-C have directly visualized the radiologic plain film image(s) with the below findings:      Interpretation per the Radiologist below, if available at the time of this note:    CT ABDOMEN PELVIS W IV CONTRAST Additional Contrast? None   Final Result   No CT evidence of obstructive uropathy or acute intra-abdominal pathology. CT HEAD WO CONTRAST   Final Result   There is a stable left ventricular shunt tube in place. The lateral   ventricles are slit-like. 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 Clinical correlation required.            CT HEAD WO CONTRAST    Result Date: 1/31/2023  EXAMINATION: CT OF THE HEAD WITHOUT CONTRAST  1/31/2023 12:19 pm TECHNIQUE: CT of the head was performed without the administration of intravenous contrast. Automated exposure control, iterative reconstruction, and/or weight based adjustment of the mA/kV was utilized to reduce the radiation dose to as low as reasonably achievable. COMPARISON: 01/19/2023 HISTORY: ORDERING SYSTEM PROVIDED HISTORY: Seizure TECHNOLOGIST PROVIDED HISTORY: If patient is on cardiac monitor and/or pulse ox, they may be taken off cardiac monitor and pulse ox, left on O2 if currently on. All monitors reattached when patient returns to room. Has a \"code stroke\" or \"stroke alert\" been called? ->No Reason for exam:->Seizure Decision Support Exception - unselect if not a suspected or confirmed emergency medical condition->Emergency Medical Condition (MA) Is the patient pregnant?->No Reason for Exam: Seizure FINDINGS: BRAIN/VENTRICLES: There is a stable left ventricular shunt tube in place. The lateral ventricles are slit-like. There is no acute intracranial hemorrhage, mass effect or midline shift. No abnormal extra-axial fluid collection. The gray-white differentiation is maintained without evidence of an acute infarct. There is no evidence of hydrocephalus. ORBITS: The visualized portion of the orbits demonstrate no acute abnormality. SINUSES: The visualized paranasal sinuses and mastoid air cells demonstrate no acute abnormality. SOFT TISSUES/SKULL:  No acute abnormality of the visualized skull or soft tissues. There is a stable left ventricular shunt tube in place. The lateral ventricles are slit-like. 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 Clinical correlation required.      CT HEAD WO CONTRAST    Result Date: 1/19/2023  EXAMINATION: CTA OF THE HEAD AND NECK WITH CONTRAST; CT OF THE HEAD WITHOUT CONTRAST 1/19/2023 5:49 pm: TECHNIQUE: CTA of the head and neck was performed with the administration of intravenous contrast. Multiplanar reformatted images are provided for review. MIP images are provided for review. Stenosis of the internal carotid arteries measured using NASCET criteria. Automated exposure control, iterative reconstruction, and/or weight based adjustment of the mA/kV was utilized to reduce the radiation dose to as low as reasonably achievable.; CT of the head was performed without the administration of intravenous contrast. Automated exposure control, iterative reconstruction, and/or weight based adjustment of the mA/kV was utilized to reduce the radiation dose to as low as reasonably achievable. Noncontrast CT of the head with reconstructed 2-D images are also provided for review. COMPARISON: None. HISTORY: ORDERING SYSTEM PROVIDED HISTORY: ha/LOSS OF VISION FINDINGS: CT HEAD: BRAIN/VENTRICLES:  Stable left-sided  shunt. No acute intracranial hemorrhage or extraaxial fluid collection. Grey-white differentiation is maintained. No evidence of mass, mass effect or midline shift. No evidence of hydrocephalus. ORBITS: The visualized portion of the orbits demonstrate no acute abnormality. SINUSES:  The visualized paranasal sinuses and mastoid air cells demonstrate no acute abnormality. SOFT TISSUES/SKULL: No acute abnormality of the visualized skull or soft tissues. CTA NECK: AORTIC ARCH/ARCH VESSELS: No dissection or arterial injury. No significant stenosis of the brachiocephalic or subclavian arteries. CAROTID ARTERIES: No dissection, arterial injury, or hemodynamically significant stenosis by NASCET criteria. VERTEBRAL ARTERIES: No dissection, arterial injury, or significant stenosis. SOFT TISSUES: The lung apices are clear. No cervical or superior mediastinal lymphadenopathy. The larynx and pharynx are unremarkable. No acute abnormality of the salivary and thyroid glands. BONES: No acute osseous abnormality.  CTA HEAD: ANTERIOR CIRCULATION: No significant stenosis of the intracranial internal carotid, anterior cerebral, or middle cerebral arteries. No aneurysm. POSTERIOR CIRCULATION: No significant stenosis of the vertebral, basilar, or posterior cerebral arteries. No aneurysm. OTHER: No dural venous sinus thrombosis on this non-dedicated study. 1. No acute intracranial abnormality. Stable left-sided  shunt. No ventriculomegaly. 2. Unremarkable CTA of the head and neck. CT HEAD WO CONTRAST    Result Date: 1/17/2023  Site: [facility] #: [id number] #: [id number] #: [de-identified] #: [id number] #: [id number]: CT HEAD WO CONTRASTExam Date/Time: 01/17/2023 09:10 PMAdmitting Diagnosis: Head trauma, mod-severeReason for Exam: Head trauma, mod-severe Dictated by: Charolette Lundborg CANDE: 01/17/2023 09:59 PMT: This document is confidential medical information. Unauthorized disclosure or use of this information is prohibited by law. If you are not the intended recipient of this document, please advise  us by calling immediately [phone]. Impression/Conclusion below HISTORY:   Head trauma, mod-severe seizures COMPARISON:  CT head December 9, 2022 TECHNIQUE:  Noncontrast multiplanar CT images of the head NOTE:  If there are questions about the content of this report, please contact [address] [place] [address] radiology by calling [phone] FINDINGS: BRAIN PARENCHYMA: No evidence intracranial hemorrhage or mass effect. Left frontal approach intraventricular catheter is again seen with tip near the foramen lobe, unchanged. VENTRICLES/SULCI: Ventricles are slitlike, similar in appearance. No hydronephrosis. EXTRA-AXIAL SPACES:  Unremarkable PARANASAL SINUSES/MASTOIDS: Unremarkable BONES:  [Remote postsurgical changes right frontal bone OTHER: None IMPRESSION: Stable appearance of the left frontal approach intraventricular catheter. No hydrocephalus or acute intracranial abnormality.  SIGNED BY: Janina Kwong MD on 1/17/2023  9:56 PM   [facility] Imaging Report - Main Call Center [phone] -  [facility] Call Center: [phone]       CT ABDOMEN PELVIS W IV CONTRAST Additional Contrast? None    Result Date: 1/31/2023  EXAMINATION: CT OF THE ABDOMEN AND PELVIS WITH CONTRAST 1/31/2023 12:19 pm TECHNIQUE: CT of the abdomen and pelvis was performed with the administration of intravenous contrast. Multiplanar reformatted images are provided for review. Automated exposure control, iterative reconstruction, and/or weight based adjustment of the mA/kV was utilized to reduce the radiation dose to as low as reasonably achievable. COMPARISON: CT abdomen and pelvis dated 12/31/2022. HISTORY: ORDERING SYSTEM PROVIDED HISTORY: Right flank pain and suprapubic pain. Concern for UTI versus pyelonephritis versus kidney stone TECHNOLOGIST PROVIDED HISTORY: Additional Contrast?->None Reason for exam:->Right flank pain and suprapubic pain. Concern for UTI versus pyelonephritis versus kidney stone Decision Support Exception - unselect if not a suspected or confirmed emergency medical condition->Emergency Medical Condition (MA) Reason for Exam: Right flank pain and suprapubic pain. Concern for UTI versus pyelonephritis versus kidney stone FINDINGS: CARDIOVASCULAR: The visualized heart and pericardium demonstrate no acute abnormality. The aorta and branch vessels are patent and normal in caliber. LUNG BASES: There are no focal consolidations or pleural effusions. HEPATOBILIARY: There are no focal hepatic lesions. The gallbladder is surgically absent. There is prominence of the biliary system, which is expected status post cholecystectomy. SPLEEN: Unremarkable. PANCREAS: Unremarkable ADRENAL GLANDS: Unremarkable. KIDNEYS: Kidneys are normal in size and contour and demonstrate symmetric enhancement. There is no hydronephrosis. There is a 3 mm nonobstructing right renal calculus. ABDOMINAL NODES: No adenopathy is appreciated. PELVIC ORGANS: The urinary bladder is unremarkable.   The uterus is surgically absent. PERITONEUM/MESENTERY/BOWEL: The stomach is unremarkable. There is no bowel obstruction. There is no bowel wall thickening. There is diverticulosis without evidence of diverticulitis. The appendix is surgically absent.  shunt is noted with its tip terminating in the pelvis. BONES/SOFT TISSUES: There is no acute osseous or soft tissue abnormality. No CT evidence of obstructive uropathy or acute intra-abdominal pathology. CTA HEAD NECK W CONTRAST    Result Date: 1/19/2023  EXAMINATION: CTA OF THE HEAD AND NECK WITH CONTRAST; CT OF THE HEAD WITHOUT CONTRAST 1/19/2023 5:49 pm: TECHNIQUE: CTA of the head and neck was performed with the administration of intravenous contrast. Multiplanar reformatted images are provided for review. MIP images are provided for review. Stenosis of the internal carotid arteries measured using NASCET criteria. Automated exposure control, iterative reconstruction, and/or weight based adjustment of the mA/kV was utilized to reduce the radiation dose to as low as reasonably achievable.; CT of the head was performed without the administration of intravenous contrast. Automated exposure control, iterative reconstruction, and/or weight based adjustment of the mA/kV was utilized to reduce the radiation dose to as low as reasonably achievable. Noncontrast CT of the head with reconstructed 2-D images are also provided for review. COMPARISON: None. HISTORY: ORDERING SYSTEM PROVIDED HISTORY: ha/LOSS OF VISION FINDINGS: CT HEAD: BRAIN/VENTRICLES:  Stable left-sided  shunt. No acute intracranial hemorrhage or extraaxial fluid collection. Grey-white differentiation is maintained. No evidence of mass, mass effect or midline shift. No evidence of hydrocephalus. ORBITS: The visualized portion of the orbits demonstrate no acute abnormality. SINUSES:  The visualized paranasal sinuses and mastoid air cells demonstrate no acute abnormality.  SOFT TISSUES/SKULL: No acute abnormality of the visualized skull or soft tissues. CTA NECK: AORTIC ARCH/ARCH VESSELS: No dissection or arterial injury. No significant stenosis of the brachiocephalic or subclavian arteries. CAROTID ARTERIES: No dissection, arterial injury, or hemodynamically significant stenosis by NASCET criteria. VERTEBRAL ARTERIES: No dissection, arterial injury, or significant stenosis. SOFT TISSUES: The lung apices are clear. No cervical or superior mediastinal lymphadenopathy. The larynx and pharynx are unremarkable. No acute abnormality of the salivary and thyroid glands. BONES: No acute osseous abnormality. CTA HEAD: ANTERIOR CIRCULATION: No significant stenosis of the intracranial internal carotid, anterior cerebral, or middle cerebral arteries. No aneurysm. POSTERIOR CIRCULATION: No significant stenosis of the vertebral, basilar, or posterior cerebral arteries. No aneurysm. OTHER: No dural venous sinus thrombosis on this non-dedicated study. 1. No acute intracranial abnormality. Stable left-sided  shunt. No ventriculomegaly. 2. Unremarkable CTA of the head and neck. XR SHUNT SERIES PLACEMENT (<4 VIEWS)    Result Date: 1/19/2023  EXAMINATION: SHUNT SERIES 1/19/2023 4:12 pm COMPARISON: None HISTORY: ORDERING SYSTEM PROVIDED HISTORY: R/O ABNORMALITY TECHNOLOGIST PROVIDED HISTORY: Reason for exam:->R/O ABNORMALITY Reason for Exam: foot burn FINDINGS:  shunt catheter projects over the left cranium, left chest and projecting over the right upper quadrant where it is coiled. The tip projects in the right lower quadrant. Moderate stool burden without evidence of bowel obstruction. No catheter discontinuity identified. No prevertebral soft tissue swelling identified in the cervical spine. No acute findings in the chest without focal consolidation. Unremarkable  shunt without appreciable catheter discontinuity. No results found.     MEDICAL DECISION MAKING / ED COURSE:      PROCEDURES: Procedures    None    Patient was given:  Medications   promethazine (PHENERGAN) in sodium chloride 0.9% 50 mL IVPB 25 mg (0 mg IntraVENous Stopped 1/31/23 1355)   HYDROmorphone (DILAUDID) injection 1 mg (1 mg IntraVENous Given 1/31/23 1303)   iopamidol (ISOVUE-370) 76 % injection 75 mL (75 mLs IntraVENous Given 1/31/23 1225)       CONSULTS: (Who and What was discussed)  None          Chronic Conditions affecting care:    has a past medical history of ADHD (attention deficit hyperactivity disorder) (1988), Depression with anxiety, Diabetes mellitus ([address] Difficult intubation, Encounter for imaging to screen for metal prior to MRI (06/01/2021), ESBL (extended spectrum beta-lactamase) producing bacteria infection (11/06/2019), Functional ovarian cysts (2008), Headache(784.0), History of blood transfusion, History of kidney stones, History of PCOS, Hydrocephalus ([address] Hyperlipidemia, Irritable bowel syndrome (2004), Meningitis (34/1134), Neutrophilic leukocytosis, Nicotine dependence, PONV (postoperative nausea and vomiting), Primary osteoarthritis of left knee (07/01/2016), S/P cone biopsy of cervix (2004), Scoliosis (1990.s), Seizures ([address]  (ventriculoperitoneal) shunt status (1982), and Wears glasses. Records Reviewed( Source)     CC/HPI Summary, DDx, ED Course, and Reassessment:     Emergency room course: Patient on exam pupils are equal round and reactive to light extraocular movement is intact. Throat is clear. She has no bite wound to the jaw or to her tongue. Neck is supple full range of motion without midline tenderness. She has full range of motion all extremity. Cardiovascular regular rhythm, lungs are clear. No wheeze rales or rhonchi. Abdomen shows some mild suprapubic tenderness with palpation. No rebound or guarding noted. No CVA or flank tenderness. Patient is full range of motion all extremity  strength 5+ equal bilaterally.   Good strength against resisted plantar and dorsiflexion. She has no facial drooping noted. No slurred speech noted. Tongue and smile does not deviate. She is alert. She is alert and oriented x4. Does not appear to be in acute distress. Lab from today shows:  CBC within normal limits with a white count of 6.9. CMP shows elevated alkaline phosphatase 139, elevated ALT of 49. Normal AST 18. Urinalysis show negative leukocytes, positive for nitrites, negative for ketones, negative for blood. Microscopically bacteria is 4+, WBC of 4, RBC 0. Epithelial cells of 3. Urine drug screen shows positive for oxycodone which she does have on her medication list.    Lactic acid 1.3    Alcohol is nondetected  Salicylate less than 0.3  Acetaminophen less than 5. Troponin less than 0.01    At this point I did discuss with patient her lab results from today. Also discussed her CT of her abdomen and her head results. CT of abdomen was no acute abnormality seen in the abdomen or pelvis. CT of head showed no acute intracranial abnormalities. See results above. Discussed at this point discharge plan. Since she is having dysuria and she has positive nitrites and 4+ bacteria in the urine I will go ahead and treat her for possible UTI. Have her follow-up with her neurosurgeon. And she can follow back up with Dr. Domingo Castillo who is actually treating her for UTI as well. Continue her home medication as prescribed. Advised her not to miss any of her antiseizure medication. Make sure she take it as prescribed only. Return for any worsening. And she was okay with this plan. She has not had any further evidence of seizure while here in the ED. Disposition Considerations (Tests not ordered but considered, Shared Decision Making, Pt Expectation of Test or Tx.):   See discussion above      The patient tolerated their visit well. I evaluated the patient. The physician was available for consultation as needed.   The patient and / or the family were informed of the results of any tests, a time was given to answer questions, a plan was proposed and they agreed with plan. I am the Primary Clinician of Record. CLINICAL IMPRESSION:  1. Urinary tract infection without hematuria, site unspecified    2. Seizure ([address]    3. Dysuria        DISPOSITION Decision To Discharge 01/31/2023 01:48:30 PM      PATIENT REFERRED TO:  Uriel Flores, [address]  [address] [facility]  [phone]    Call in 1 day  As needed    Elijah Citizen, [address] [address]  [phone]    Call in 1 day  If symptoms worsen    DISCHARGE MEDICATIONS:  Discharge Medication List as of 1/31/2023  2:25 PM        START taking these medications    Details   !! oxyCODONE-acetaminophen (PERCOCET) 5-325 MG per tablet Take 1 tablet by mouth every 6 hours as needed for Pain for up to 3 days. Max Daily Amount: 4 tablets, Disp-10 tablet, R-0Print      nitrofurantoin, macrocrystal-monohydrate, (MACROBID) 100 MG capsule Take 1 capsule by mouth 2 times daily for 5 days, Disp-10 capsule, R-0Print      !! diphenhydrAMINE (BENADRYL ALLERGY) 25 MG tablet Take 1 tablet by mouth every 8 hours as needed for Itching, Disp-20 tablet, R-0Print       !! - Potential duplicate medications found. Please discuss with provider.           DISCONTINUED MEDICATIONS:  Discharge Medication List as of 1/31/2023  2:25 PM                 (Please note the MDM and HPI sections of this note were completed with a voice recognition program.  Efforts were made to edit the dictations but occasionally words are mis-transcribed.)    Electronically signed, Marjorie Huitron PA-C,          Marjorie Huitron PA-C  02/03/23 [id number]

## 2023-01-31 NOTE — ED NOTES
Handoff report given to primary RN; all questions and concerns answered at this time; receiving Rn acknowledged report;     Dolores Whalen RN  01/31/23 [id number]

## 2023-02-01 LAB
ORGANISM: ABNORMAL
URINE CULTURE, ROUTINE: ABNORMAL

## 2023-02-02 ENCOUNTER — HOSPITAL ENCOUNTER (INPATIENT)
Age: 43
LOS: 1 days | Discharge: LEFT BEFORE DISCHARGE | DRG: 251 | End: 2023-02-03
Attending: INTERNAL MEDICINE | Admitting: INTERNAL MEDICINE
Payer: COMMERCIAL

## 2023-02-02 ENCOUNTER — TELEPHONE (OUTPATIENT)
Dept: GYNECOLOGY | Age: 43
End: 2023-02-02

## 2023-02-02 DIAGNOSIS — Z16.12 URINARY TRACT INFECTION DUE TO EXTENDED-SPECTRUM BETA LACTAMASE (ESBL) PRODUCING ESCHERICHIA COLI: Primary | ICD-10-CM

## 2023-02-02 DIAGNOSIS — N39.0 URINARY TRACT INFECTION DUE TO EXTENDED-SPECTRUM BETA LACTAMASE (ESBL) PRODUCING ESCHERICHIA COLI: Primary | ICD-10-CM

## 2023-02-02 DIAGNOSIS — B96.29 URINARY TRACT INFECTION DUE TO EXTENDED-SPECTRUM BETA LACTAMASE (ESBL) PRODUCING ESCHERICHIA COLI: Primary | ICD-10-CM

## 2023-02-02 LAB
ANION GAP SERPL CALCULATED.3IONS-SCNC: 14 MMOL/L (ref 3–16)
BACTERIA: ABNORMAL /HPF
BASOPHILS ABSOLUTE: 0.1 K/UL (ref 0–0.2)
BASOPHILS RELATIVE PERCENT: 0.7 %
BILIRUBIN URINE: NEGATIVE
BLOOD, URINE: NEGATIVE
BUN BLDV-MCNC: 7 MG/DL (ref 7–20)
CALCIUM SERPL-MCNC: 9.7 MG/DL (ref 8.3–10.6)
CHLORIDE BLD-SCNC: 102 MMOL/L (ref 99–110)
CLARITY: CLEAR
CO2: 22 MMOL/L (ref 21–32)
COLOR: YELLOW
CREAT SERPL-MCNC: 0.6 MG/DL (ref 0.6–1.1)
EOSINOPHILS ABSOLUTE: 0.1 K/UL (ref 0–0.6)
EOSINOPHILS RELATIVE PERCENT: 0.8 %
EPITHELIAL CELLS, UA: 2 /HPF (ref 0–5)
GFR SERPL CREATININE-BSD FRML MDRD: >60 ML/MIN/{1.73_M2}
GLUCOSE BLD-MCNC: 103 MG/DL (ref 70–99)
GLUCOSE BLD-MCNC: 91 MG/DL (ref 70–99)
GLUCOSE URINE: NEGATIVE MG/DL
HCT VFR BLD CALC: 44.7 % (ref 36–48)
HEMOGLOBIN: 14.6 G/DL (ref 12–16)
HYALINE CASTS: 0 /LPF (ref 0–8)
KETONES, URINE: NEGATIVE MG/DL
LACTIC ACID, SEPSIS: 0.9 MMOL/L (ref 0.4–1.9)
LACTIC ACID, SEPSIS: 1.4 MMOL/L (ref 0.4–1.9)
LEUKOCYTE ESTERASE, URINE: NEGATIVE
LYMPHOCYTES ABSOLUTE: 1.8 K/UL (ref 1–5.1)
LYMPHOCYTES RELATIVE PERCENT: 19.2 %
MCH RBC QN AUTO: 28.3 PG (ref 26–34)
MCHC RBC AUTO-ENTMCNC: 32.7 G/DL (ref 31–36)
MCV RBC AUTO: 86.5 FL (ref 80–100)
MICROSCOPIC EXAMINATION: YES
MONOCYTES ABSOLUTE: 0.4 K/UL (ref 0–1.3)
MONOCYTES RELATIVE PERCENT: 4.6 %
NEUTROPHILS ABSOLUTE: 7 K/UL (ref 1.7–7.7)
NEUTROPHILS RELATIVE PERCENT: 74.7 %
NITRITE, URINE: POSITIVE
PDW BLD-RTO: 16.6 % (ref 12.4–15.4)
PERFORMED ON: NORMAL
PH UA: 6 (ref 5–8)
PLATELET # BLD: 288 K/UL (ref 135–450)
PMV BLD AUTO: 7.9 FL (ref 5–10.5)
POTASSIUM REFLEX MAGNESIUM: 3.6 MMOL/L (ref 3.5–5.1)
PROTEIN UA: NEGATIVE MG/DL
RBC # BLD: 5.17 M/UL (ref 4–5.2)
RBC UA: 1 /HPF (ref 0–4)
SODIUM BLD-SCNC: 138 MMOL/L (ref 136–145)
SPECIFIC GRAVITY UA: 1.01 (ref 1–1.03)
URINE REFLEX TO CULTURE: ABNORMAL
URINE TYPE: ABNORMAL
UROBILINOGEN, URINE: 0.2 E.U./DL
WBC # BLD: 9.4 K/UL (ref 4–11)
WBC UA: 2 /HPF (ref 0–5)

## 2023-02-02 PROCEDURE — 87086 URINE CULTURE/COLONY COUNT: CPT

## 2023-02-02 PROCEDURE — 1200000000 HC SEMI PRIVATE

## 2023-02-02 PROCEDURE — 36415 COLL VENOUS BLD VENIPUNCTURE: CPT

## 2023-02-02 PROCEDURE — 83605 ASSAY OF LACTIC ACID: CPT

## 2023-02-02 PROCEDURE — 36569 INSJ PICC 5 YR+ W/O IMAGING: CPT

## 2023-02-02 PROCEDURE — 87040 BLOOD CULTURE FOR BACTERIA: CPT

## 2023-02-02 PROCEDURE — 6360000002 HC RX W HCPCS: Performed by: PHYSICIAN ASSISTANT

## 2023-02-02 PROCEDURE — 81001 URINALYSIS AUTO W/SCOPE: CPT

## 2023-02-02 PROCEDURE — 2580000003 HC RX 258: Performed by: PHYSICIAN ASSISTANT

## 2023-02-02 PROCEDURE — 80048 BASIC METABOLIC PNL TOTAL CA: CPT

## 2023-02-02 PROCEDURE — 6370000000 HC RX 637 (ALT 250 FOR IP): Performed by: NURSE PRACTITIONER

## 2023-02-02 PROCEDURE — 85025 COMPLETE CBC W/AUTO DIFF WBC: CPT

## 2023-02-02 PROCEDURE — 83036 HEMOGLOBIN GLYCOSYLATED A1C: CPT

## 2023-02-02 PROCEDURE — 99285 EMERGENCY DEPT VISIT HI MDM: CPT

## 2023-02-02 PROCEDURE — 6370000000 HC RX 637 (ALT 250 FOR IP): Performed by: INTERNAL MEDICINE

## 2023-02-02 RX ORDER — SODIUM CHLORIDE 0.9 % (FLUSH) 0.9 %
5-40 SYRINGE (ML) INJECTION EVERY 12 HOURS SCHEDULED
Status: DISCONTINUED | OUTPATIENT
Start: 2023-02-02 | End: 2023-02-03 | Stop reason: HOSPADM

## 2023-02-02 RX ORDER — PROMETHAZINE HYDROCHLORIDE 12.5 MG/1
12.5 TABLET ORAL 3 TIMES DAILY PRN
COMMUNITY
Start: 2022-12-19

## 2023-02-02 RX ORDER — SODIUM CHLORIDE 9 MG/ML
25 INJECTION, SOLUTION INTRAVENOUS PRN
Status: DISCONTINUED | OUTPATIENT
Start: 2023-02-02 | End: 2023-02-03 | Stop reason: HOSPADM

## 2023-02-02 RX ORDER — DIPHENHYDRAMINE HCL 25 MG
25 TABLET ORAL EVERY 8 HOURS PRN
Status: DISCONTINUED | OUTPATIENT
Start: 2023-02-02 | End: 2023-02-03

## 2023-02-02 RX ORDER — OXYCODONE HYDROCHLORIDE AND ACETAMINOPHEN 5; 325 MG/1; MG/1
1 TABLET ORAL EVERY 4 HOURS PRN
Status: DISCONTINUED | OUTPATIENT
Start: 2023-02-02 | End: 2023-02-03

## 2023-02-02 RX ORDER — LIDOCAINE HYDROCHLORIDE 10 MG/ML
5 INJECTION, SOLUTION EPIDURAL; INFILTRATION; INTRACAUDAL; PERINEURAL ONCE
Status: DISCONTINUED | OUTPATIENT
Start: 2023-02-02 | End: 2023-02-03 | Stop reason: HOSPADM

## 2023-02-02 RX ORDER — 0.9 % SODIUM CHLORIDE 0.9 %
30 INTRAVENOUS SOLUTION INTRAVENOUS ONCE
Status: COMPLETED | OUTPATIENT
Start: 2023-02-02 | End: 2023-02-02

## 2023-02-02 RX ORDER — INSULIN LISPRO 100 [IU]/ML
0-4 INJECTION, SOLUTION INTRAVENOUS; SUBCUTANEOUS NIGHTLY
Status: DISCONTINUED | OUTPATIENT
Start: 2023-02-02 | End: 2023-02-03 | Stop reason: HOSPADM

## 2023-02-02 RX ORDER — INSULIN LISPRO 100 [IU]/ML
0-8 INJECTION, SOLUTION INTRAVENOUS; SUBCUTANEOUS
Status: DISCONTINUED | OUTPATIENT
Start: 2023-02-03 | End: 2023-02-03 | Stop reason: HOSPADM

## 2023-02-02 RX ORDER — GABAPENTIN 400 MG/1
400 CAPSULE ORAL 4 TIMES DAILY
Status: DISCONTINUED | OUTPATIENT
Start: 2023-02-02 | End: 2023-02-03 | Stop reason: HOSPADM

## 2023-02-02 RX ORDER — DIPHENHYDRAMINE HCL 25 MG
50 TABLET ORAL NIGHTLY
Status: DISCONTINUED | OUTPATIENT
Start: 2023-02-02 | End: 2023-02-03 | Stop reason: HOSPADM

## 2023-02-02 RX ORDER — DEXTROSE MONOHYDRATE 100 MG/ML
INJECTION, SOLUTION INTRAVENOUS CONTINUOUS PRN
Status: DISCONTINUED | OUTPATIENT
Start: 2023-02-02 | End: 2023-02-03 | Stop reason: HOSPADM

## 2023-02-02 RX ORDER — SODIUM CHLORIDE 0.9 % (FLUSH) 0.9 %
5-40 SYRINGE (ML) INJECTION PRN
Status: DISCONTINUED | OUTPATIENT
Start: 2023-02-02 | End: 2023-02-03 | Stop reason: HOSPADM

## 2023-02-02 RX ORDER — PROMETHAZINE HYDROCHLORIDE 25 MG/1
12.5 TABLET ORAL 3 TIMES DAILY PRN
Status: DISCONTINUED | OUTPATIENT
Start: 2023-02-02 | End: 2023-02-03 | Stop reason: HOSPADM

## 2023-02-02 RX ORDER — HYDROCODONE BITARTRATE AND ACETAMINOPHEN 5; 325 MG/1; MG/1
1 TABLET ORAL EVERY 6 HOURS PRN
Status: DISCONTINUED | OUTPATIENT
Start: 2023-02-02 | End: 2023-02-02

## 2023-02-02 RX ORDER — DIAZEPAM 2 MG/1
2 TABLET ORAL NIGHTLY PRN
Status: DISCONTINUED | OUTPATIENT
Start: 2023-02-02 | End: 2023-02-03 | Stop reason: HOSPADM

## 2023-02-02 RX ADMIN — DIPHENHYDRAMINE HCL 50 MG: 25 TABLET ORAL at 20:27

## 2023-02-02 RX ADMIN — GABAPENTIN 400 MG: 400 CAPSULE ORAL at 20:27

## 2023-02-02 RX ADMIN — PROMETHAZINE HYDROCHLORIDE 12.5 MG: 25 TABLET ORAL at 22:56

## 2023-02-02 RX ADMIN — MEROPENEM 1000 MG: 1 INJECTION, POWDER, FOR SOLUTION INTRAVENOUS at 18:51

## 2023-02-02 RX ADMIN — SODIUM CHLORIDE 2118 ML: 9 INJECTION, SOLUTION INTRAVENOUS at 18:58

## 2023-02-02 RX ADMIN — OXYCODONE AND ACETAMINOPHEN 1 TABLET: 5; 325 TABLET ORAL at 20:27

## 2023-02-02 RX ADMIN — DIAZEPAM 2 MG: 2 TABLET ORAL at 20:27

## 2023-02-02 ASSESSMENT — PAIN SCALES - GENERAL
PAINLEVEL_OUTOF10: 8
PAINLEVEL_OUTOF10: 0
PAINLEVEL_OUTOF10: 10
PAINLEVEL_OUTOF10: 10

## 2023-02-02 ASSESSMENT — PAIN DESCRIPTION - ONSET
ONSET: ON-GOING

## 2023-02-02 ASSESSMENT — PAIN DESCRIPTION - ORIENTATION
ORIENTATION: LOWER
ORIENTATION: LOWER
ORIENTATION: RIGHT;LEFT

## 2023-02-02 ASSESSMENT — PAIN DESCRIPTION - LOCATION
LOCATION: ABDOMEN;FLANK
LOCATION: ABDOMEN
LOCATION: ABDOMEN

## 2023-02-02 ASSESSMENT — PAIN DESCRIPTION - DIRECTION
RADIATING_TOWARDS: FLANK
RADIATING_TOWARDS: FLANK

## 2023-02-02 ASSESSMENT — PAIN - FUNCTIONAL ASSESSMENT
PAIN_FUNCTIONAL_ASSESSMENT: ACTIVITIES ARE NOT PREVENTED
PAIN_FUNCTIONAL_ASSESSMENT: NONE - DENIES PAIN

## 2023-02-02 ASSESSMENT — PAIN DESCRIPTION - DESCRIPTORS
DESCRIPTORS: SHARP
DESCRIPTORS: ACHING
DESCRIPTORS: ACHING

## 2023-02-02 ASSESSMENT — PAIN DESCRIPTION - FREQUENCY
FREQUENCY: CONTINUOUS

## 2023-02-02 ASSESSMENT — PAIN DESCRIPTION - PAIN TYPE
TYPE: ACUTE PAIN

## 2023-02-02 NOTE — PROGRESS NOTES
Order for PICC line per Dr. Bon Ruth. Pre procedure and timeout done with pt's RN. After discussion with RN patient would benefit from midline at time time given she is in sepsis protocol and blood cultures are still pending. Attempted right sided midline however midline would not draw blood. Right basilic attempted. Occlusive dessing applied. Successful insertion of a single lumen midline into pt's left cephalic vein. No issues gaining access or advancing guidewire/introducer/Midline. Midline is cut at 10cm and out externally 0 cm. Single lumen flushes without resistance and draw back brisk blood return. Midline site CDI with hemostasis maintained and a biopatch applied to site. Pt instructed to stay in bed and keep arm flat and still for 30 minutes  to promote hemostasis.      Satish Menjivar given handoff report

## 2023-02-02 NOTE — TELEPHONE ENCOUNTER
Patient states the Macrobid prescribed from the ER for a UTI. States she is having lower abdominal pain, Fever 102F, nausea and vomiting. She is having problems keeping any medication down. States she has issues in the past of the infection going into her kidneys and needing IV antibiotics. Patient was informed that Dr. Sims is out of office but a message will be sent. Also informed patient that if her condition is severe to seek emergency treatment. Please advise.

## 2023-02-02 NOTE — ED NOTES
Pharmacy Medication Reconciliation Note     List of medications patient is currently taking is complete.     Source of information:   EMR  patient    Notes regarding home medications:   No medications taken yet today  Reports still taking vimpat despite prescription being  after ER prescribed - likely needs refilled if to continue   Has OP patient management and is prescribed percocet daily PRN - has acute prescription from ED she is using supplemental during this acute issue     Medications Removed/Flagged for Review and Reason:      Nader Ybarra PharmD, BCPS  2023  6:30 PM

## 2023-02-02 NOTE — H&P
Hospital Medicine History & Physical      PCP: Ammon Morales APRN - CNP    Date of Admission: 2/2/2023    Chief Complaint:    Chief Complaint   Patient presents with    Flank Pain     Pt arrives to the ED with flank pain. Pt states she was at her PCP last week and said she had a UTI. Pt is complaining of L and R \"kindey pain\"      History Of Present Illness:    Patient is a 51-year-old female with past medical history of kidney stones who presents to the hospital due to bilateral flank pain as well as blood in the urine. According to the patient she thinks she has urinary tract infection, she has history of urinary tract infections, she was recently treated with Macrobid. She recently had urine culture which came out positive for ESBL E. coli. Patient denies fevers chills however mentions she has bilateral flank pain 7/10 intensity, nonradiating. Patient otherwise denied nausea vomiting diarrhea. Patient mentions she had seizure the day before yesterday. Past Medical History:          Diagnosis Date    ADHD (attention deficit hyperactivity disorder) 1988    Depression with anxiety     Diabetes mellitus ([address]     pre-diabetes    Difficult intubation     airway swelled up    Encounter for imaging to screen for metal prior to MRI 06/01/2021    MRI Conditional [employer] Non-Programmable shunt model#[id number] implanted 10/30/2020 at [facility]. Normal Mode. 1.5T or 3.0T. ESBL (extended spectrum beta-lactamase) producing bacteria infection 11/06/2019    urine    Functional ovarian cysts 2008    rt ovary cyst x 2 yrs.     Headache(784.0)     migraines    History of blood transfusion     at birth    History of kidney stones     History of PCOS     had hysterectomy in 2016    Hydrocephalus [facility])     Hyperlipidemia     Irritable bowel syndrome 2004    had colonoscopy about 6 yrs ago    Meningitis 05/2077    Neutrophilic leukocytosis     Nicotine dependence     PONV (postoperative nausea and vomiting) very nauseated and sometimes wakes up with a Migraine--happened once after brain surgery    Primary osteoarthritis of left knee 2016    S/P cone biopsy of cervix 2004    Scoliosis 1990.s    Seizures ([address]     twice--last one in 2022     (ventriculoperitoneal) shunt status     hydrocephalus f/w Neurosurgeon at [address] [facility]    Wears glasses     reading       Past Surgical History:          Procedure Laterality Date    ABDOMEN SURGERY N/A 2021    REMOVAL OF ABDOMINAL WALL MASS performed by Osmin Mi MD at [facility]      appendicitis     SECTION  2005    placenta previa    CHOLECYSTECTOMY      COLONOSCOPY  2004    COLPOSCOPY      CSF SHUNT      replaced at age 17    CYSTOSCOPY      stone removal    HEMORRHOID SURGERY      HERNIA REPAIR Bilateral     inguinal    HERNIA REPAIR      hiatal hernia    HYSTERECTOMY, TOTAL ABDOMINAL (CERVIX REMOVED)  2016    TAHBSO, adhesions/PCOS    KNEE ARTHROSCOPY Left 2015    medial meniscectomy, chondroplasty, plica resection    LITHOTRIPSY Bilateral 12/10/2019    OTHER SURGICAL HISTORY  10/19/2016    op lap    VENTRAL HERNIA REPAIR N/A 2022    LAPAROSCOPIC LYSIS OF ADHESIONS AND ABDOMINAL WALL MASS REMOVAL performed by Osmin Mi MD at [place] [address]      multiple revisions, most recent        Medications Prior to Admission:      Prior to Admission medications    Medication Sig Start Date End Date Taking? Authorizing Provider   promethazine (PHENERGAN) 12.5 MG tablet Take 12.5 mg by mouth 3 times daily as needed for Nausea 22   Historical Provider, MD   oxyCODONE-acetaminophen (PERCOCET) 5-325 MG per tablet Take 1 tablet by mouth every 6 hours as needed for Pain for up to 3 days.  Max Daily Amount: 4 tablets 1/31/23 2/3/23  Hakeem Ott PA-C   nitrofurantoin, macrocrystal-monohydrate, (MACROBID) 100 MG capsule Take 1 capsule by mouth 2 times daily for 5 days 1/31/23 2/5/23  Esequile Joseph PA-C   diphenhydrAMINE (BENADRYL ALLERGY) 25 MG tablet Take 1 tablet by mouth every 8 hours as needed for Itching 1/31/23 3/2/23  Esequiel Joseph PA-C   oxyCODONE-acetaminophen (PERCOCET) 5-325 MG per tablet Take 1 tablet by mouth daily as needed for Pain. 1/10/23   Historical Provider, MD   diphenhydrAMINE (BENADRYL) 25 MG tablet Take 50 mg by mouth at bedtime    Historical Provider, MD   lacosamide (VIMPAT) 50 MG TABS tablet Take 1 tablet by mouth 2 times daily for 15 days. 12/13/22 2/2/23  Jack De León MD   diazePAM (VALIUM) 2 MG tablet Take 2 mg by mouth nightly as needed. 11/17/22   Historical Provider, MD   gabapentin (NEURONTIN) 400 MG capsule Take 400 mg by mouth 4 times daily. Historical Provider, MD       Allergies:  Bee venom, Bentyl [dicyclomine hcl], Dicyclomine, Ketorolac tromethamine, Levofloxacin, Maitake, Shiitake mushroom, Sulfa antibiotics, Vancomycin, Zosyn [piperacillin sod-tazobactam so], Adhesive tape, Sulfacetamide, Zofran [ondansetron], Acetaminophen, Butalbital-apap-caff-cod, Ceftaroline, Daptomycin, Fosfomycin tromethamine, Haldol [haloperidol], Keppra [levetiracetam], Ketamine, Methocarbamol, Morphine, Nitrofurantoin, Prochlorperazine, Reglan [metoclopramide], Silicone, and Tazobactam    Social History:      TOBACCO:   reports that she has been smoking cigarettes. She has a 9.00 pack-year smoking history. She has never used smokeless tobacco.  ETOH:   reports no history of alcohol use.       Family History:       Reviewed in detail and non contributory          Problem Relation Age of Onset    High Blood Pressure Mother     Diabetes Mother     High Cholesterol Mother     Depression Mother     Diabetes Maternal Grandmother     High Blood Pressure Maternal Grandmother     High Cholesterol Maternal Grandmother     Heart Disease Maternal Grandfather     High Blood Pressure Maternal Grandfather     Heart Disease Paternal Grandmother     Stroke Paternal Grandfather     Depression Sister     Cirrhosis Father     Rheum Arthritis Neg Hx     Osteoarthritis Neg Hx     Asthma Neg Hx     Breast Cancer Neg Hx     Cancer Neg Hx     Heart Failure Neg Hx     Hypertension Neg Hx     Migraines Neg Hx     Ovarian Cancer Neg Hx     Rashes/Skin Problems Neg Hx     Seizures Neg Hx     Thyroid Disease Neg Hx        REVIEW OF SYSTEMS:   Pertinent positives as noted in the HPI. All other systems reviewed and negative. PHYSICAL EXAM PERFORMED:    /81   Pulse (!) 109   Temp 98.2 °F (36.8 °C) (Oral)   Resp 17   Ht 4' 11\" (1.499 m)   Wt 155 lb 10.3 oz (70.6 kg)   LMP 10/31/2016 (Exact Date)   SpO2 97%   BMI 31.44 kg/m²     General appearance:  No apparent distress, cooperative. HEENT:  Normal cephalic, atraumatic without obvious deformity. Conjunctivae/corneas clear. Neck: Supple, with full range of motion. No cervical lymphadenopathy  Respiratory:  Normal respiratory effort. Clear to auscultation, bilaterally without Rales/Wheezes/Rhonchi. Cardiovascular:  Regular rate and rhythm with normal S1/S2 without murmurs, rubs or gallops. Abdomen: Soft, non-tender, non-distended, normal bowel sounds. Musculoskeletal:  No edema noted bilaterally. No tenderness on palpation   Skin: no rash visible  Neurologic:  Neurologically intact without any focal sensory/motor deficits. grossly non-focal.  Psychiatric:  Alert and oriented, normal mood  Peripheral Pulses: +2 palpable, equal bilaterally       Labs:     Recent Labs     01/31/23  1130 02/02/23  1547   WBC 6.9 9.4   HGB 15.1 14.6   HCT 44.8 44.7    288     Recent Labs     01/31/23  1130 02/02/23  1547    138   K 3.8 3.6    102   CO2 21 22   BUN 10 7   CREATININE 0.6 0.6   CALCIUM 10.1 9.7     Recent Labs     01/31/23  1130   AST 18   ALT 49*   BILITOT 0.4   ALKPHOS 139*     No results for input(s): INR in the last 72 hours.   Recent Labs     01/31/23  1130   TROPONINI <0.01       Urinalysis:      Lab Results Component Value Date/Time    NITRU POSITIVE 01/31/2023 11:13 AM    WBCUA 4 01/31/2023 11:13 AM    BACTERIA 4+ 01/31/2023 11:13 AM    RBCUA 0 01/31/2023 11:13 AM    BLOODU Negative 01/31/2023 11:13 AM    SPECGRAV 1.005 01/31/2023 11:13 AM    GLUCOSEU Negative 01/31/2023 11:13 AM    GLUCOSEU NEGATIVE 10/08/2011 10:10 PM       Radiology:       No orders to display       Patient is a 81-year-old female with past medical history of kidney stones who presents to the hospital due to bilateral flank pain as well as blood in the urine. According to the patient she thinks she has urinary tract infection, she has history of urinary tract infections, she was recently treated with Macrobid. She recently had urine culture which came out positive for ESBL E. coli. Patient denies fevers chills however mentions she has bilateral flank pain 7/10 intensity, nonradiating. Patient otherwise denied nausea vomiting diarrhea. Patient mentions she had seizure the day before yesterday. Assessment  Bilateral flank pain, hematuria suspect secondary to UTI, nephrolithiasis  History of ESBL UTI  Diabetes mellitus  ADHD    Plan  Start IV meropenem  IV fluids  Consult urology  Insulin sliding scale  Follow-up on blood culture, urine culture  DVT prophylaxis-Lovenox  Diet: No diet orders on file  Code Status: Prior    PT/OT Eval Status: ordered    Dispo - pending clinical improvement       Abimael Spann MD    The note was completed using EMR and Dragon dictation system. Every effort was made to ensure accuracy; however, inadvertent computerized transcription errors may be present. Thank you FREDRICK Bose CNP for the opportunity to be involved in this patient's care. If you have any questions or concerns please feel free to contact me at [phone].     Abimael Spann MD

## 2023-02-02 NOTE — ED NOTES
Multiple attempts by Sincere Kaur RN for ultrasound line placement with no success. Pradip Longoria, [address] made aware.      Corey Flor RN  02/02/23 [id number]

## 2023-02-02 NOTE — ED PROVIDER NOTES
[address] [facility]        Pt Name: Kristi Peñaloza  MRN: [id number]  [place] 1980  Date of evaluation: 2/2/2023  Provider: CRISPIN Gomez  PCP: Saint Kinder, APRN - CNP  Note Started: 6:24 PM EST     The ED Attending Physician was available for consultation but did not see or evaluate this patient. CHIEF COMPLAINT       Chief Complaint   Patient presents with    Flank Pain     Pt arrives to the ED with flank pain. Pt states she was at her PCP last week and said she had a UTI. Pt is complaining of L and R \"kindey pain\"        HISTORY OF PRESENT ILLNESS   (Location, Timing/Onset, Context/Setting, Quality, Duration, Modifying Factors, Severity, Associated Signs and Symptoms)  Note limiting factors. Kristi Peñaloza is a 38 y.o. female who presents with complaint of UTI a and worsening pelvic and flank pain. Urine testing at her gynecologist office several days ago was cultured and results from yesterday showed ESBL E. coli, so the patient was advised to come to the hospital.  She has been on oral nitrofurantoin therapy for a few days but says is not helping. She has had a lot of UTIs and kidney infections in the past.  She denies any fever at home. Says she has pain in both flank areas and in the suprapubic area, and is just getting worse. She denies any urinary pain or burning or bleeding. Reports past history of hysterectomy. Denies nausea or vomiting. Denies cough, chest pain, shortness of breath. Nursing Notes were all reviewed and agreed with or any disagreements were addressed in the HPI. REVIEW OF SYSTEMS    (2-9 systems for level 4, 10 or more for level 5)     Positives and pertinent negatives as per HPI.      PAST MEDICAL HISTORY     Past Medical History:   Diagnosis Date    ADHD (attention deficit hyperactivity disorder) 1988    Depression with anxiety     Diabetes mellitus ([address]     pre-diabetes    Difficult intubation     airway swelled up    Encounter for imaging to screen for metal prior to MRI 2021    MRI Conditional Medtronic Non-Programmable shunt model#[id number] implanted 10/30/2020 at [facility]. Normal Mode. 1.5T or 3.0T. ESBL (extended spectrum beta-lactamase) producing bacteria infection 2019    urine    Functional ovarian cysts 2008    rt ovary cyst x 2 yrs.     Headache(784.0)     migraines    History of blood transfusion     at birth    History of kidney stones     History of PCOS     had hysterectomy in 2016    Hydrocephalus ([address]     Hyperlipidemia     Irritable bowel syndrome 2004    had colonoscopy about 6 yrs ago    Meningitis     Neutrophilic leukocytosis     Nicotine dependence     PONV (postoperative nausea and vomiting)     very nauseated and sometimes wakes up with a Migraine--happened once after brain surgery    Primary osteoarthritis of left knee 2016    S/P cone biopsy of cervix 2004    Scoliosis 1990.s    Seizures ([address]     twice--last one in 2022     (ventriculoperitoneal) shunt status     hydrocephalus f/w Neurosurgeon at [facility]    Wears glasses     reading       SURGICAL HISTORY     Past Surgical History:   Procedure Laterality Date    ABDOMEN SURGERY N/A 2021    REMOVAL OF ABDOMINAL WALL MASS performed by Nanette Krueger MD at [facility]      appendicitis     SECTION  2005    placenta previa    CHOLECYSTECTOMY      COLONOSCOPY  2004    COLPOSCOPY      CSF SHUNT      replaced at age 17    CYSTOSCOPY      stone removal    HEMORRHOID SURGERY      HERNIA REPAIR Bilateral     inguinal    HERNIA REPAIR      hiatal hernia    HYSTERECTOMY, TOTAL ABDOMINAL (CERVIX REMOVED)  2016    TAHBSO, adhesions/PCOS    KNEE ARTHROSCOPY Left 2015    medial meniscectomy, chondroplasty, plica resection    LITHOTRIPSY Bilateral 12/10/2019    OTHER SURGICAL HISTORY  10/19/2016    op lap    VENTRAL HERNIA REPAIR N/A 9/14/2022    LAPAROSCOPIC LYSIS OF ADHESIONS AND ABDOMINAL WALL MASS REMOVAL performed by Osmin Mi MD at [address]      multiple revisions, most recent 2015       CURRENTMEDICATIONS       Previous Medications    DIAZEPAM (VALIUM) 2 MG TABLET    Take 2 mg by mouth nightly as needed. DIPHENHYDRAMINE (BENADRYL ALLERGY) 25 MG TABLET    Take 1 tablet by mouth every 8 hours as needed for Itching    DIPHENHYDRAMINE (BENADRYL) 25 MG TABLET    Take 50 mg by mouth at bedtime    GABAPENTIN (NEURONTIN) 400 MG CAPSULE    Take 400 mg by mouth 4 times daily. LACOSAMIDE (VIMPAT) 50 MG TABS TABLET    Take 1 tablet by mouth 2 times daily for 15 days. NITROFURANTOIN, MACROCRYSTAL-MONOHYDRATE, (MACROBID) 100 MG CAPSULE    Take 1 capsule by mouth 2 times daily for 5 days    OXYCODONE-ACETAMINOPHEN (PERCOCET) 5-325 MG PER TABLET    Take 1 tablet by mouth daily as needed for Pain. OXYCODONE-ACETAMINOPHEN (PERCOCET) 5-325 MG PER TABLET    Take 1 tablet by mouth every 6 hours as needed for Pain for up to 3 days.  Max Daily Amount: 4 tablets    PROMETHAZINE (PHENERGAN) 12.5 MG TABLET    Take 12.5 mg by mouth 3 times daily as needed for Nausea       ALLERGIES     Bee venom, Bentyl [dicyclomine hcl], Dicyclomine, Ketorolac tromethamine, Levofloxacin, Maitake, Shiitake mushroom, Sulfa antibiotics, Vancomycin, Zosyn [piperacillin sod-tazobactam so], Adhesive tape, Sulfacetamide, Zofran [ondansetron], Acetaminophen, Butalbital-apap-caff-cod, Ceftaroline, Daptomycin, Fosfomycin tromethamine, Haldol [haloperidol], Keppra [levetiracetam], Ketamine, Methocarbamol, Morphine, Nitrofurantoin, Prochlorperazine, Reglan [metoclopramide], Silicone, and Tazobactam    FAMILYHISTORY       Family History   Problem Relation Age of Onset    High Blood Pressure Mother     Diabetes Mother     High Cholesterol Mother     Depression Mother     Diabetes Maternal Grandmother     High Blood Pressure Maternal Grandmother     High Cholesterol Maternal Grandmother     Heart Disease Maternal Grandfather     High Blood Pressure Maternal Grandfather     Heart Disease Paternal Grandmother     Stroke Paternal Grandfather     Depression Sister     Cirrhosis Father     Rheum Arthritis Neg Hx     Osteoarthritis Neg Hx     Asthma Neg Hx     Breast Cancer Neg Hx     Cancer Neg Hx     Heart Failure Neg Hx     Hypertension Neg Hx     Migraines Neg Hx     Ovarian Cancer Neg Hx     Rashes/Skin Problems Neg Hx     Seizures Neg Hx     Thyroid Disease Neg Hx         SOCIAL HISTORY       Social History     Tobacco Use    Smoking status: Every Day     Packs/day: 0.50     Years: 18.00     Pack years: 9.00     Types: Cigarettes    Smokeless tobacco: Never   Vaping Use    Vaping Use: Never used   Substance Use Topics    Alcohol use: No     Alcohol/week: 0.0 standard drinks    Drug use: Never       SCREENINGS           PHYSICAL EXAM    (up to 7 for level 4, 8 or more for level 5)     ED Triage Vitals [02/02/23 1356]   BP Temp Temp Source Heart Rate Resp SpO2 Height Weight   133/81 98.2 °F (36.8 °C) Oral (!) 109 17 97 % 4' 11\" (1.499 m) 155 lb 10.3 oz (70.6 kg)       Physical Exam  Vitals and nursing note reviewed. Constitutional:       General: She is not in acute distress. Appearance: Normal appearance. She is not ill-appearing. HENT:      Head: Normocephalic and atraumatic. Nose: Nose normal.   Eyes:      General:         Right eye: No discharge. Left eye: No discharge. Cardiovascular:      Rate and Rhythm: Normal rate and regular rhythm. Heart sounds: Normal heart sounds. Pulmonary:      Effort: Pulmonary effort is normal. No respiratory distress. Breath sounds: Normal breath sounds. No stridor. No wheezing, rhonchi or rales. Abdominal:      General: Bowel sounds are normal. There is no distension. Palpations: Abdomen is soft. There is no mass. Tenderness: There is no abdominal tenderness.  There is no guarding or rebound. Comments: Tenderness palpation in the suprapubic area, and diffusely over the flanks bilaterally. Musculoskeletal:         General: Normal range of motion. Cervical back: Normal range of motion. Skin:     General: Skin is warm and dry. Neurological:      General: No focal deficit present. Mental Status: She is alert and oriented to person, place, and time. Psychiatric:         Mood and Affect: Mood normal.         Behavior: Behavior normal.       DIAGNOSTIC RESULTS   LABS:    Labs Reviewed   CBC WITH AUTO DIFFERENTIAL - Abnormal; Notable for the following components:       Result Value    RDW 16.6 (*)     All other components within normal limits   BASIC METABOLIC PANEL W/ REFLEX TO MG FOR LOW K - Abnormal; Notable for the following components:    Glucose 103 (*)     All other components within normal limits   CULTURE, BLOOD 1   CULTURE, BLOOD 2   LACTATE, SEPSIS   URINALYSIS WITH REFLEX TO CULTURE   LACTATE, SEPSIS   HEMOGLOBIN A1C   POCT GLUCOSE       When ordered only abnormal lab results are displayed. All other labs were within normal range or not returned as of this dictation. EKG: When ordered, EKG's are interpreted by the Emergency Department Physician in the absence of a cardiologist.  Please see their note for interpretation of EKG. RADIOLOGY:   All images such as plain radiographs, CT, Ultrasound and MRI are interpreted by a radiologist. Some images are visualized and preliminarily interpreted by me and/or the ED attending physician. Interpretation per the radiologist below, if available at the time of this note:    No orders to display       CONSULTS:  [address]   Unless otherwise noted below, none.      Procedures    EMERGENCY DEPARTMENT COURSE and DIFFERENTIAL DIAGNOSIS/MDM:   Vitals:    Vitals:    02/02/23 1356   BP: 133/81   Pulse: (!) 109   Resp: 17   Temp: 98.2 °F (36.8 °C)   TempSrc: Oral   SpO2: 97%   Weight: 155 lb 10.3 oz (70.6 kg)   Height: 4' 11\" (1.499 m)       Patient was given the following medications:  Medications   0.9 % sodium chloride bolus (has no administration in time range)   meropenem (MERREM) 1,000 mg in sodium chloride 0.9 % 100 mL IVPB (mini-bag) (has no administration in time range)   lidocaine PF 1 % injection 5 mL (has no administration in time range)   sodium chloride flush 0.9 % injection 5-40 mL (has no administration in time range)   sodium chloride flush 0.9 % injection 5-40 mL (has no administration in time range)   0.9 % sodium chloride infusion (has no administration in time range)   diazePAM (VALIUM) tablet 2 mg (has no administration in time range)   diphenhydrAMINE (BENADRYL) tablet 25 mg (has no administration in time range)   diphenhydrAMINE (BENADRYL) tablet 50 mg (has no administration in time range)   gabapentin (NEURONTIN) capsule 400 mg (has no administration in time range)   glucose chewable tablet 16 g (has no administration in time range)   dextrose bolus 10% 125 mL (has no administration in time range)     Or   dextrose bolus 10% 250 mL (has no administration in time range)   glucagon (rDNA) injection 1 mg (has no administration in time range)   dextrose 10 % infusion (has no administration in time range)   insulin lispro (HUMALOG) injection vial 0-8 Units (has no administration in time range)   insulin lispro (HUMALOG) injection vial 0-4 Units (has no administration in time range)   meropenem (MERREM) 1,000 mg in sodium chloride 0.9 % 100 mL IVPB (mini-bag) (has no administration in time range)           Is this patient to be included in the SEP-1 Core Measure due to severe sepsis or septic shock? No   Exclusion criteria - the patient is NOT to be included for SEP-1 Core Measure due to:  2+ SIRS criteria are not met    Urine culture results showed ESBL E. coli. Basic labs showed normal serum white blood cell count, normal creatinine, normal serum lactic acid.   Patient is not septic. Nursing staff was unable to obtain peripheral access, so PICC team was consulted for line placement. Suspicion for ureteral obstruction was low, and patient has had a great many CTs performed, no indication for reimaging emergently. I consulted the hospitalist, who agreed to accept and admit the patient. The patient verbalized understanding and agreement with this plan of care. CRITICAL CARE TIME   none    FINAL IMPRESSION      1. Urinary tract infection due to extended-spectrum beta lactamase (ESBL) producing Escherichia coli          DISPOSITION/PLAN   DISPOSITION Admitted 02/02/2023 06:24:15 PM      PATIENT REFERRED TO:  No follow-up provider specified.     DISCHARGE MEDICATIONS:  New Prescriptions    No medications on file       DISCONTINUED MEDICATIONS:  Discontinued Medications    No medications on file            (Please note that portions of this note were completed with a voice recognition program.  Efforts were made to edit the dictations but occasionally words are mis-transcribed.)    CRISPIN Maya (electronically signed)       Temo Hernandes [place]  02/02/23 [id number]

## 2023-02-02 NOTE — ED NOTES
Bladder scan completed on pt with 175 max volume found. Pt not wanting straight catheter at this time.      Shefali Gonzalez RN  02/02/23 6958

## 2023-02-03 ENCOUNTER — TELEPHONE (OUTPATIENT)
Dept: FAMILY MEDICINE CLINIC | Age: 43
End: 2023-02-03

## 2023-02-03 VITALS
RESPIRATION RATE: 18 BRPM | DIASTOLIC BLOOD PRESSURE: 66 MMHG | WEIGHT: 154.54 LBS | OXYGEN SATURATION: 100 % | SYSTOLIC BLOOD PRESSURE: 109 MMHG | HEART RATE: 61 BPM | HEIGHT: 59 IN | TEMPERATURE: 98.7 F | BODY MASS INDEX: 31.16 KG/M2

## 2023-02-03 LAB
BLOOD CULTURE, ROUTINE: NORMAL
CULTURE, BLOOD 2: NORMAL
ESTIMATED AVERAGE GLUCOSE: 128.4 MG/DL
GLUCOSE BLD-MCNC: 101 MG/DL (ref 70–99)
GLUCOSE BLD-MCNC: 127 MG/DL (ref 70–99)
HBA1C MFR BLD: 6.1 %
HPV COMMENT: ABNORMAL
HPV TYPE 16: DETECTED
HPV TYPE 18: NOT DETECTED
HPVOH (OTHER TYPES): DETECTED
PERFORMED ON: ABNORMAL
PERFORMED ON: ABNORMAL

## 2023-02-03 PROCEDURE — 6370000000 HC RX 637 (ALT 250 FOR IP): Performed by: NURSE PRACTITIONER

## 2023-02-03 PROCEDURE — 6370000000 HC RX 637 (ALT 250 FOR IP): Performed by: INTERNAL MEDICINE

## 2023-02-03 PROCEDURE — 99253 IP/OBS CNSLTJ NEW/EST LOW 45: CPT | Performed by: INTERNAL MEDICINE

## 2023-02-03 PROCEDURE — 6360000002 HC RX W HCPCS: Performed by: INTERNAL MEDICINE

## 2023-02-03 PROCEDURE — 2580000003 HC RX 258: Performed by: PHYSICIAN ASSISTANT

## 2023-02-03 PROCEDURE — 2580000003 HC RX 258: Performed by: INTERNAL MEDICINE

## 2023-02-03 RX ORDER — OXYCODONE HYDROCHLORIDE AND ACETAMINOPHEN 5; 325 MG/1; MG/1
1 TABLET ORAL EVERY 8 HOURS PRN
Status: DISCONTINUED | OUTPATIENT
Start: 2023-02-03 | End: 2023-02-03

## 2023-02-03 RX ORDER — OXYCODONE HYDROCHLORIDE AND ACETAMINOPHEN 5; 325 MG/1; MG/1
1 TABLET ORAL DAILY PRN
Status: DISCONTINUED | OUTPATIENT
Start: 2023-02-04 | End: 2023-02-03 | Stop reason: HOSPADM

## 2023-02-03 RX ADMIN — OXYCODONE AND ACETAMINOPHEN 1 TABLET: 5; 325 TABLET ORAL at 00:25

## 2023-02-03 RX ADMIN — PROMETHAZINE HYDROCHLORIDE 12.5 MG: 25 TABLET ORAL at 08:40

## 2023-02-03 RX ADMIN — GABAPENTIN 400 MG: 400 CAPSULE ORAL at 14:28

## 2023-02-03 RX ADMIN — Medication 10 ML: at 08:41

## 2023-02-03 RX ADMIN — OXYCODONE AND ACETAMINOPHEN 1 TABLET: 5; 325 TABLET ORAL at 05:04

## 2023-02-03 RX ADMIN — MEROPENEM 1000 MG: 1 INJECTION, POWDER, FOR SOLUTION INTRAVENOUS at 10:42

## 2023-02-03 RX ADMIN — DIPHENHYDRAMINE HCL 25 MG: 25 TABLET ORAL at 05:04

## 2023-02-03 RX ADMIN — GABAPENTIN 400 MG: 400 CAPSULE ORAL at 08:40

## 2023-02-03 RX ADMIN — MEROPENEM 1000 MG: 1 INJECTION, POWDER, FOR SOLUTION INTRAVENOUS at 01:24

## 2023-02-03 RX ADMIN — OXYCODONE AND ACETAMINOPHEN 1 TABLET: 5; 325 TABLET ORAL at 10:38

## 2023-02-03 ASSESSMENT — ENCOUNTER SYMPTOMS
COUGH: 0
BACK PAIN: 0
NAUSEA: 0
VOMITING: 0
SHORTNESS OF BREATH: 0
EYE PAIN: 0
SORE THROAT: 0
ABDOMINAL PAIN: 0

## 2023-02-03 ASSESSMENT — PAIN SCALES - GENERAL
PAINLEVEL_OUTOF10: 0
PAINLEVEL_OUTOF10: 8
PAINLEVEL_OUTOF10: 8
PAINLEVEL_OUTOF10: 9
PAINLEVEL_OUTOF10: 9
PAINLEVEL_OUTOF10: 0
PAINLEVEL_OUTOF10: 8

## 2023-02-03 ASSESSMENT — PAIN DESCRIPTION - ORIENTATION
ORIENTATION: RIGHT;LEFT

## 2023-02-03 ASSESSMENT — PAIN - FUNCTIONAL ASSESSMENT
PAIN_FUNCTIONAL_ASSESSMENT: ACTIVITIES ARE NOT PREVENTED

## 2023-02-03 ASSESSMENT — PAIN DESCRIPTION - PAIN TYPE
TYPE: ACUTE PAIN

## 2023-02-03 ASSESSMENT — PAIN DESCRIPTION - DESCRIPTORS
DESCRIPTORS: SHARP

## 2023-02-03 ASSESSMENT — PAIN DESCRIPTION - LOCATION
LOCATION: ABDOMEN;FLANK

## 2023-02-03 ASSESSMENT — PAIN DESCRIPTION - FREQUENCY
FREQUENCY: CONTINUOUS

## 2023-02-03 ASSESSMENT — PAIN DESCRIPTION - ONSET
ONSET: ON-GOING

## 2023-02-03 NOTE — PROGRESS NOTES
Pt resting in bed at beginning of shift. She c/o bilateral flank pain, as well as suprapubic pain, described as sharp. She denies burning with urination. Pt very anxious and stating she does not want to see anyone from urology. She also is requesting additional pain medication. She c/o nausea this a.m., PRN Phenergan administered. Will continue to monitor.

## 2023-02-03 NOTE — PROGRESS NOTES
4 Eyes Admission Assessment     I agree as the admission nurse that 2 RN's have performed a thorough Head to Toe Skin Assessment on the patient. ALL assessment sites listed below have been assessed on admission. Areas assessed by both nurses:   [x]   Head, Face, and Ears   [x]   Shoulders, Back, and Chest  [x]   Arms, Elbows, and Hands   [x]   Coccyx, Sacrum, and Ischum  [x]   Legs, Feet, and Heels        Does the Patient have Skin Breakdown?   No         Noel Prevention initiated:  NA   Wound Care Orders initiated:  NA      [facility] nurse consulted for Pressure Injury (Stage 3,4, Unstageable, DTI, NWPT, and Complex wounds):  NA      Nurse 1 eSignature: Electronically signed by Bo Pérez RN on 2/3/23 at 12:51 AM EST    **SHARE this note so that the co-signing nurse is able to place an eSignature**    Nurse 2 eSignature: Electronically signed by Gray Morrow RN on 2/3/23 at 3:06 AM EST

## 2023-02-03 NOTE — PROGRESS NOTES
Patient admitted to room 3122 from ER via stretcher. Oriented to room, call light, and floor policies. Plan of care reviewed with patient. VSS. Tele in place reading sinus rhythm with a rate of 63. Safety precautions in place; call light and bedside table within reach. Pt encouraged to call for needs or ambulation. Pt VU. Will continue to monitor.

## 2023-02-03 NOTE — PLAN OF CARE
Problem: Discharge Planning  Goal: Discharge to home or other facility with appropriate resources  2/3/2023 0857 by Arsenio Alfonso RN  Outcome: Progressing  Flowsheets (Taken 2/3/2023 6452)  Discharge to home or other facility with appropriate resources:   Identify barriers to discharge with patient and caregiver   Identify discharge learning needs (meds, wound care, etc)  2/3/2023 0049 by Saira Eddy RN  Outcome: Progressing  Flowsheets (Taken 2/2/2023 2030)  Discharge to home or other facility with appropriate resources:   Identify barriers to discharge with patient and caregiver   Arrange for needed discharge resources and transportation as appropriate     Problem: Pain  Goal: Verbalizes/displays adequate comfort level or baseline comfort level  2/3/2023 0857 by Arsenio Alfonso RN  Outcome: Progressing  Flowsheets (Taken 2/3/2023 0857)  Verbalizes/displays adequate comfort level or baseline comfort level:   Encourage patient to monitor pain and request assistance   Administer analgesics based on type and severity of pain and evaluate response   Consider cultural and social influences on pain and pain management   Assess pain using appropriate pain scale   Implement non-pharmacological measures as appropriate and evaluate response   Notify Licensed Independent Practitioner if interventions unsuccessful or patient reports new pain  2/3/2023 0049 by Saira Eddy RN  Outcome: Progressing  Flowsheets (Taken 2/3/2023 0049)  Verbalizes/displays adequate comfort level or baseline comfort level:   Encourage patient to monitor pain and request assistance   Assess pain using appropriate pain scale   Administer analgesics based on type and severity of pain and evaluate response   Implement non-pharmacological measures as appropriate and evaluate response     Problem: Safety - Adult  Goal: Free from fall injury  2/3/2023 0857 by Arsenio Alfonso RN  Outcome: Progressing  Flowsheets (Taken 2/3/2023 6962)  Free From Fall Injury: Instruct family/caregiver on patient safety  2/3/2023 0049 by Sasha March RN  Outcome: Progressing     Problem: ABCDS Injury Assessment  Goal: Absence of physical injury  2/3/2023 0857 by Sharmila Garza RN  Outcome: Progressing  Flowsheets  Taken 2/3/2023 0857 by Sharmila Garza RN  Absence of Physical Injury: Implement safety measures based on patient assessment  Taken 2/3/2023 0057 by Sasha March RN  Absence of Physical Injury: Implement safety measures based on patient assessment  2/3/2023 0049 by Sasha March RN  Outcome: Progressing     Problem: Infection - Adult  Goal: Absence of infection at discharge  Outcome: Progressing  Flowsheets (Taken 2/3/2023 0857)  Absence of infection at discharge:   Assess and monitor for signs and symptoms of infection   Monitor lab/diagnostic results   Monitor all insertion sites i.e., indwelling lines, tubes and drains   Administer medications as ordered   Instruct and encourage patient and family to use good hand hygiene technique  Goal: Absence of infection during hospitalization  Outcome: Progressing  Flowsheets (Taken 2/3/2023 0857)  Absence of infection during hospitalization:   Assess and monitor for signs and symptoms of infection   Monitor lab/diagnostic results   Monitor all insertion sites i.e., indwelling lines, tubes and drains   Administer medications as ordered   Instruct and encourage patient and family to use good hand hygiene technique

## 2023-02-03 NOTE — ED NOTES
Report called to LOUIS RAO RN on 3W. Denies further questions.      Armani Downing RN  02/02/23 1946

## 2023-02-03 NOTE — PROGRESS NOTES
Pt signing out AMA. Paperwork reviewed with pt, signed by pt, this RN, and Fam Otero RN as witness. MD notified.

## 2023-02-03 NOTE — CONSULTS
Infectious Diseases Inpatient Consult Note      Reason for Consult:  UTI, ESBL on recent urine culture     Requesting Physician:         Primary Care Physician:  Mike Herrera, FREDRICK - CNP    History Obtained From:  Epic and patient      CHIEF COMPLAINT:     Chief Complaint   Patient presents with    Flank Pain     Pt arrives to the ED with flank pain. Pt states she was at her PCP last week and said she had a UTI. Pt is complaining of L and R \"kindey pain\"          HISTORY OF PRESENT ILLNESS:  38 y.o.  woman with significant history for ADHD, depression, prediabetes, history of ESBL infection in the past, history of kidney stones, history of hydrocephalus requiring  shunt, history of hyperlipidemia, history of seizures admitted to the hospital secondary to flank pain. She recently heartfelt OB/GYN visit where she had a urine culture taken on 1/30/23 were positive for ESBL. She was placed on nitrofurantoin patient did not tolerated well hence was presented to the hospital for further antibiotics. Creatinine on this admission 0.6 sodium 138 WBC normal, urinalysis nitrate positive leuk esterase and WBC negative repeat urine culture in process, blood culture in process. CT abdomen pelvis from 1/31/23 no evidence of obstructive uropathy no acute intra-abdominal findings. Off note she has multiple antibiotic allergies. Past Medical History:    Past Medical History:   Diagnosis Date    ADHD (attention deficit hyperactivity disorder) 1988    Depression with anxiety     Diabetes mellitus ([facility] [address]     pre-diabetes    Difficult intubation     airway swelled up    Encounter for imaging to screen for metal prior to MRI 06/01/2021    MRI Conditional Med[employer] Non-Programmable shunt model#[id number] implanted 10/30/2020 at [facility]. Normal Mode. 1.5T or 3.0T.     ESBL (extended spectrum beta-lactamase) producing bacteria infection 11/06/2019    urine    Functional ovarian cysts 2008    rt ovary cyst x 2 yrs.    Headache(784.0)     migraines    History of blood transfusion     at birth    History of kidney stones     History of PCOS     had hysterectomy in 2016    Hydrocephalus [facility])     Hyperlipidemia     Irritable bowel syndrome 2004    had colonoscopy about 6 yrs ago    Meningitis     Neutrophilic leukocytosis     Nicotine dependence     PONV (postoperative nausea and vomiting)     very nauseated and sometimes wakes up with a Migraine--happened once after brain surgery    Primary osteoarthritis of left knee 2016    S/P cone biopsy of cervix 2004    Scoliosis .s    Seizures ([address]     twice--last one in 2022     (ventriculoperitoneal) shunt status     hydrocephalus f/w Neurosurgeon at [facility]    Wears glasses     reading       Past Surgical History:    Past Surgical History:   Procedure Laterality Date    ABDOMEN SURGERY N/A 2021    REMOVAL OF ABDOMINAL WALL MASS performed by Merline Sachs, MD at [facility]      appendicitis     SECTION      placenta previa    CHOLECYSTECTOMY      COLONOSCOPY      COLPOSCOPY      CSF SHUNT      replaced at age 17    CYSTOSCOPY      stone removal    HEMORRHOID SURGERY      HERNIA REPAIR Bilateral     inguinal    HERNIA REPAIR      hiatal hernia    HYSTERECTOMY, TOTAL ABDOMINAL (CERVIX REMOVED)  2016    TAHBSO, adhesions/PCOS    KNEE ARTHROSCOPY Left 2015    medial meniscectomy, chondroplasty, plica resection    LITHOTRIPSY Bilateral 12/10/2019    OTHER SURGICAL HISTORY  10/19/2016    op lap    VENTRAL HERNIA REPAIR N/A 2022    LAPAROSCOPIC LYSIS OF ADHESIONS AND ABDOMINAL WALL MASS REMOVAL performed by Merline Sachs, MD at [place] [address]      multiple revisions, most recent        Current Medications:    No outpatient medications have been marked as taking for the 23 encounter [facility] Encounter).        Allergies:  Bee venom, Bentyl [dicyclomine hcl], Dicyclomine, Ketorolac tromethamine, Levofloxacin, Maitake, Shiitake mushroom, Sulfa antibiotics, Vancomycin, Zosyn [piperacillin sod-tazobactam so], Adhesive tape, Sulfacetamide, Zofran [ondansetron], Acetaminophen, Butalbital-apap-caff-cod, Ceftaroline, Daptomycin, Fosfomycin tromethamine, Haldol [haloperidol], Keppra [levetiracetam], Ketamine, Methocarbamol, Morphine, Nitrofurantoin, Prochlorperazine, Reglan [metoclopramide], Silicone, and Tazobactam    Immunizations :   Immunization History   Administered Date(s) Administered    COVID-19, PFIZER PURPLE top, DILUTE for use, (age 15 y+), 30mcg/0.3mL 11/10/2021    Influenza Vaccine, unspecified formulation 10/19/2013    Influenza Virus Vaccine 11/04/2010, 10/06/2018, 10/30/2020, 11/02/2021         Social History:     Social History     Tobacco Use    Smoking status: Every Day     Packs/day: 0.50     Years: 18.00     Pack years: 9.00     Types: Cigarettes    Smokeless tobacco: Never   Vaping Use    Vaping Use: Never used   Substance Use Topics    Alcohol use: No     Alcohol/week: 0.0 standard drinks    Drug use: Never     Social History     Tobacco Use   Smoking Status Every Day    Packs/day: 0.50    Years: 18.00    Pack years: 9.00    Types: Cigarettes   Smokeless Tobacco Never      Family History   Problem Relation Age of Onset    High Blood Pressure Mother     Diabetes Mother     High Cholesterol Mother     Depression Mother     Diabetes Maternal Grandmother     High Blood Pressure Maternal Grandmother     High Cholesterol Maternal Grandmother     Heart Disease Maternal Grandfather     High Blood Pressure Maternal Grandfather     Heart Disease Paternal Grandmother     Stroke Paternal Grandfather     Depression Sister     Cirrhosis Father     Rheum Arthritis Neg Hx     Osteoarthritis Neg Hx     Asthma Neg Hx     Breast Cancer Neg Hx     Cancer Neg Hx     Heart Failure Neg Hx     Hypertension Neg Hx     Migraines Neg Hx     Ovarian Cancer Neg Hx Rashes/Skin Problems Neg Hx     Seizures Neg Hx     Thyroid Disease Neg Hx           REVIEW OF SYSTEMS:      Constitutional:  negative for fevers, chills, night sweats  Eyes:  negative for blurred vision, eye discharge, visual disturbance   HEENT:  negative for hearing loss, ear drainage,nasal congestion  Respiratory:  negative for cough, shortness of breath or hemoptysis   Cardiovascular:  negative for chest pain, palpitations, syncope  Gastrointestinal:  negative for nausea, vomiting, diarrhea, constipation, abdominal pain++   Genitourinary:  negative for frequency, dysuria, urinary incontinence, hematuria  Hematologic/Lymphatic:  negative for easy bruising, bleeding and lymphadenopathy  Allergic/Immunologic:  negative for recurrent infections, angioedema, anaphylaxis   Endocrine:  negative for weight changes, polyuria, polydipsia and polyphagia  Musculoskeletal:  negative for joint  pain, swelling, decreased range of motion  Integumentary: No rashes, skin lesions  Neurological:  negative for headaches, slurred speech, unilateral weakness  Psychiatric: negative for hallucinations,confusion,agitation.      PHYSICAL EXAM:      Vitals:    /69   Pulse 62   Temp 98.5 °F (36.9 °C)   Resp 16   Ht 4' 11\" (1.499 m)   Wt 154 lb 8.7 oz (70.1 kg)   LMP 10/31/2016 (Exact Date)   SpO2 98%   BMI 31.21 kg/m²     General Appearance: alert,in no acute distress, no pallor, no icterus   Skin: warm and dry, no rash or erythema  Head: normocephalic and atraumatic  Eyes: pupils equal, round, and reactive to light, conjunctivae normal  ENT: tympanic membrane, external ear and ear canal normal bilaterally, nose without deformity, nasal mucosa and turbinates normal without polyps  Neck: supple and non-tender without mass, no thyromegaly  no cervical lymphadenopathy  Pulmonary/Chest: clear to auscultation bilaterally- no wheezes, rales or rhonchi, normal air movement, no respiratory distress  Cardiovascular: normal rate, regular rhythm, normal S1 and S2, no murmurs, rubs, clicks, or gallops, no carotid bruits  Abdomen: soft, non-tender, non-distended, normal bowel sounds, no masses or organomegaly  Extremities: no cyanosis, clubbing or edema  Musculoskeletal: normal range of motion, no joint swelling, deformity or tenderness  Integumentary: No rashes, no abnormal skin lesions, no petechiae  Neurologic: reflexes normal and symmetric, no cranial nerve deficit  Psych:  Orientation, sensorium, mood normal   Lines: PICC    DATA:    CBC:   Lab Results   Component Value Date    WBC 9.4 02/02/2023    HGB 14.6 02/02/2023    HCT 44.7 02/02/2023    MCV 86.5 02/02/2023     02/02/2023     RENAL:   Lab Results   Component Value Date    CREATININE 0.6 02/02/2023    BUN 7 02/02/2023     02/02/2023    K 3.6 02/02/2023     02/02/2023    CO2 22 02/02/2023     SED RATE:   Lab Results   Component Value Date/Time    SEDRATE 17 05/31/2021 12:08 AM     CK:   Lab Results   Component Value Date/Time    CKTOTAL 106 10/27/2022 09:43 PM     CRP: No results found for: CRP  Hepatic Function Panel:   Lab Results   Component Value Date/Time    ALKPHOS 139 01/31/2023 11:30 AM    ALT 49 01/31/2023 11:30 AM    AST 18 01/31/2023 11:30 AM    PROT 7.6 01/31/2023 11:30 AM    PROT 7.3 08/14/2011 07:30 PM    BILITOT 0.4 01/31/2023 11:30 AM    BILIDIR <0.2 10/25/2022 02:24 AM    IBILI see below 10/25/2022 02:24 AM    LABALBU 4.3 01/31/2023 11:30 AM     UA:  Lab Results   Component Value Date/Time    COLORU Yellow 02/02/2023 07:25 PM    CLARITYU Clear 02/02/2023 07:25 PM    GLUCOSEU Negative 02/02/2023 07:25 PM    GLUCOSEU NEGATIVE 10/08/2011 10:10 PM    BILIRUBINUR Negative 02/02/2023 07:25 PM    BILIRUBINUR NEG 01/30/2023 04:31 PM    BILIRUBINUR NEGATIVE 10/08/2011 10:10 PM    KETUA Negative 02/02/2023 07:25 PM    SPECGRAV 1.010 02/02/2023 07:25 PM    BLOODU Negative 02/02/2023 07:25 PM    PHUR 6.0 02/02/2023 07:25 PM    PROTEINU Negative 02/02/2023 07:25 PM    UROBILINOGEN 0.2 02/02/2023 07:25 PM    NITRU POSITIVE 02/02/2023 07:25 PM    LEUKOCYTESUR Negative 02/02/2023 07:25 PM    LABMICR YES 02/02/2023 07:25 PM    URINETYPE NotGiven 02/02/2023 07:25 PM      Urine Microscopic:   Lab Results   Component Value Date/Time    LABCAST 1-3 Hyaline 10/19/2014 10:57 AM    BACTERIA Rare 02/02/2023 07:25 PM    COMU see below 09/21/2022 10:38 AM    HYALCAST 0 02/02/2023 07:25 PM    WBCUA 2 02/02/2023 07:25 PM    RBCUA 1 02/02/2023 07:25 PM    EPIU 2 02/02/2023 07:25 PM     Urine Reflex to Culture:   Lab Results   Component Value Date/Time    URRFLXCULT Not Indicated 02/02/2023 07:25 PM         MICRO: cultures reviewed and updated by me   Susceptibility    Escherichia coli ESBL (1)    Antibiotic Interpretation Microscan  Method Status    ampicillin Resistant >=32 mcg/mL BACTERIAL SUSCEPTIBILITY PANEL BY LUCY     ampicillin-sulbactam Sensitive 4 mcg/mL BACTERIAL SUSCEPTIBILITY PANEL BY LUCY     ceFAZolin Resistant >=64 mcg/mL BACTERIAL SUSCEPTIBILITY PANEL BY LUCY     cefepime Resistant   BACTERIAL SUSCEPTIBILITY PANEL BY LUCY     cefTRIAXone Resistant >=64 mcg/mL BACTERIAL SUSCEPTIBILITY PANEL BY LUCY     ciprofloxacin Resistant >=4 mcg/mL BACTERIAL SUSCEPTIBILITY PANEL BY LUCY     ertapenem Sensitive <=0.12 mcg/mL BACTERIAL SUSCEPTIBILITY PANEL BY LUCY     gentamicin Sensitive <=1 mcg/mL BACTERIAL SUSCEPTIBILITY PANEL BY LUCY     levofloxacin Resistant >=8 mcg/mL BACTERIAL SUSCEPTIBILITY PANEL BY LUCY     nitrofurantoin Sensitive <=16 mcg/mL BACTERIAL SUSCEPTIBILITY PANEL BY LUCY     trimethoprim-sulfamethoxazole Sensitive <=20 mcg/mL BACTERIAL SUSCEPTIBILITY PANEL BY LUCY        Narrative  Performed by: [facility]  ORDER#: [id number]                          ORDERED BY: BURAK FRANCO   SOURCE: Urine Clean Catch                  COLLECTED:  01/30/23 16:31   ANTIBIOTICS AT ANTELMO.:                      RECEIVED :  01/30/23 18:27     Procedure Component Value Units Date/Time Culture, Urine [[id number]] Collected: 02/02/23 1925   Order Status: Sent Specimen: Urine, clean catch Updated: 02/03/23 0829   Culture, Blood 2 [[id number]] Collected: 02/02/23 1558   Order Status: Sent Specimen: Blood Updated: 02/02/23 1639   Culture, Blood 1 [[id number]] Collected: 02/02/23 1547   Order Status: Sent Specimen: Blood Updated: 02/02/23 1639     Procedure Component Value Units Date/Time   Culture, Urine [[id number]] Collected: 02/02/23 1925   Order Status: Sent Specimen: Urine, clean catch Updated: 02/03/23 0829   Culture, Blood 2 [[id number]] Collected: 02/02/23 1558   Order Status: Sent Specimen: Blood Updated: 02/02/23 1639   Culture, Blood 1 [[id number]] Collected: 02/02/23 1547   Order Status: Sent Specimen: Blood Updated: 02/02/23 1639       Blood Culture:   Lab Results   Component Value Date/Time    BC No Growth after 4 days of incubation. 01/19/2023 07:29 PM    BLOODCULT2 No Growth after 4 days of incubation. 01/19/2023 07:29 PM       Viral Culture:    Lab Results   Component Value Date/Time    COVID19 DETECTED 09/21/2022 12:41 PM     Urine Culture: No results for input(s): LABURIN in the last 72 hours. Scheduled Meds:   lidocaine 1 % injection  5 mL IntraDERmal Once    sodium chloride flush  5-40 mL IntraVENous 2 times per day    diphenhydrAMINE  50 mg Oral Nightly    gabapentin  400 mg Oral 4x Daily    insulin lispro  0-8 Units SubCUTAneous TID WC    insulin lispro  0-4 Units SubCUTAneous Nightly    meropenem  1,000 mg IntraVENous Q8H       Continuous Infusions:   sodium chloride      dextrose         PRN Meds:  oxyCODONE-acetaminophen, sodium chloride flush, sodium chloride, diazePAM, glucose, dextrose bolus **OR** dextrose bolus, glucagon (rDNA), dextrose, promethazine    Imaging:   No orders to display       All pertinent images and reports for the current Hospitalization were reviewed by me.     IMPRESSION:    Patient Active Problem List   Diagnosis    Attention deficit hyperactivity disorder (ADHD)     (ventriculoperitoneal) shunt status    Scoliosis    Prediabetes    Tobacco smoker    Onychomycosis    Congenital hydrocephalus (HCC)    Sepsis without acute organ dysfunction (HCC)    Ureteritis    Acute cystitis without hematuria    History of brain shunt    Mood disorder (HCC)    Numbness and tingling in left hand    Diverticulosis    Colitis    Infection due to extended-spectrum beta-lactamase-producing Escherichia coli    Kidney stone    Intermittent small bowel obstruction due to adhesions (HCC)    Abdominal wall mass    Cyst and pseudocyst of pancreas    Abdominal pain in female    Nausea and vomiting    COVID    Abdominal wall cellulitis    DM2 (diabetes mellitus, type 2) (HCC)    HTN (hypertension)    Cellulitis    History of extended-spectrum beta-lactamase producing Escherichia coli infection    H/O laparoscopy    Allergy to multiple antibiotics    Seizure-like activity (HCC)    UTI (urinary tract infection)     UTI  ESBL on urine cx  H/O ESBL colonization   H/o Seizure like activity  H/o  shunt placement   ADHD   H/o Renal stones      She has symptoms from December per patient after silva AT OUT side hospital and she is followed by [facility] and notes from Urology here noted and UA not much inflammation to suggest upper tract infection and urine cx from her Gynecology office ESBL noted     She has several Drug allergies              Labs, Microbiology, Radiology and pertinent results from current hospitalization and care every where were reviewed by me as a part of the consultation. PLAN :  Cont IV Meropenem as in patient  Repeat Urine cx in process  We can complete the abx course as in patient for another 24-48 hrs   Do not antcipate IV abx at dc  She had some nausea from Fosfomycin ? ?  I deleted this from allergies as  its a side effect  Ok for d/c If the cx negative      Discussed with patient/Family and Nursing d/w    Risk of Complications/Morbidity: High      Illness(es)/ Infection present that pose threat to bodily function. There is potential for severe exacerbation of infection/side effects of treatment. Therapy requires intensive monitoring for antimicrobial agent toxicity. Thanks for allowing me to participate in your patient's care please call me with any questions or concerns.     Dr. Ryann Frias MD  [address] [place] [address] Physician  Phone: [phone]   Fax : [phone]

## 2023-02-03 NOTE — PROGRESS NOTES
Pt refusing to wear tele monitor due to skin redness, irritation, and itching from the adhesive on stickers. Dr. Glendy Sims notified and did not want to DC order.

## 2023-02-03 NOTE — PLAN OF CARE
Problem: Discharge Planning  Goal: Discharge to home or other facility with appropriate resources  Outcome: Progressing  Flowsheets (Taken 2/2/2023 2030)  Discharge to home or other facility with appropriate resources:   Identify barriers to discharge with patient and caregiver   Arrange for needed discharge resources and transportation as appropriate     Problem: Pain  Goal: Verbalizes/displays adequate comfort level or baseline comfort level  Outcome: Progressing  Flowsheets (Taken 2/3/2023 0049)  Verbalizes/displays adequate comfort level or baseline comfort level:   Encourage patient to monitor pain and request assistance   Assess pain using appropriate pain scale   Administer analgesics based on type and severity of pain and evaluate response   Implement non-pharmacological measures as appropriate and evaluate response     Problem: Safety - Adult  Goal: Free from fall injury  Outcome: Progressing     Problem: ABCDS Injury Assessment  Goal: Absence of physical injury  Outcome: Progressing

## 2023-02-03 NOTE — CONSULTS
Consulting Physician: Dr. Salvador Mosley    Reason for Consult: hematuria    History of Present Illness: Farrukh Whitaker is a 38 y.o. female with chronic hydrocephalus with  shunt, OA, IBS, nephrolithiasis who is hospitalized for worsening pelvic and flank pain. Urine culture from OBGYN office was with ESBL E. Coli. She was on nitrofurantoin and felt it wasn't helping so was advised to come to the hospital. In the ED she denied fevers, urinary pain, burning or bleeding. All of this information was gathered from the chart as she immediately got mad that urology was consulted and started attempting to rip her IV out in front of myself, hospitalist and RN. I have seen her in the past multiple times and she has drug seeking behavior. Past Medical History:   Past Medical History:   Diagnosis Date    ADHD (attention deficit hyperactivity disorder) 1988    Depression with anxiety     Diabetes mellitus ([facility] [address]     pre-diabetes    Difficult intubation     airway swelled up    Encounter for imaging to screen for metal prior to MRI 06/01/2021    MRI Conditional Med[employer]onic Non-Programmable shunt model#[id number] implanted 10/30/2020 at [facility]. Normal Mode. 1.5T or 3.0T. ESBL (extended spectrum beta-lactamase) producing bacteria infection 11/06/2019    urine    Functional ovarian cysts 2008    rt ovary cyst x 2 yrs.     Headache(784.0)     migraines    History of blood transfusion     at birth    History of kidney stones     History of PCOS     had hysterectomy in 2016    Hydrocephalus [facility])     Hyperlipidemia     Irritable bowel syndrome 2004    had colonoscopy about 6 yrs ago    Meningitis 29/5941    Neutrophilic leukocytosis     Nicotine dependence     PONV (postoperative nausea and vomiting)     very nauseated and sometimes wakes up with a Migraine--happened once after brain surgery    Primary osteoarthritis of left knee 07/01/2016    S/P cone biopsy of cervix 2004    Scoliosis 1990.s    Seizures ([facility] [address] twice--last one in 2022     (ventriculoperitoneal) shunt status     hydrocephalus f/w Neurosurgeon at [facility]    Wears glasses     reading       Past Surgical History:  Past Surgical History:   Procedure Laterality Date    ABDOMEN SURGERY N/A 2021    REMOVAL OF ABDOMINAL WALL MASS performed by Jennifer Johnston MD at [facility]      appendicitis     SECTION  2005    placenta previa    CHOLECYSTECTOMY      COLONOSCOPY  2004    COLPOSCOPY      CSF SHUNT      replaced at age 17    CYSTOSCOPY      stone removal    [address] Bilateral 1988    inguinal    HERNIA REPAIR  2015    hiatal hernia    HYSTERECTOMY, TOTAL ABDOMINAL (CERVIX REMOVED)  2016    TAHBSO, adhesions/PCOS    KNEE ARTHROSCOPY Left 2015    medial meniscectomy, chondroplasty, plica resection    LITHOTRIPSY Bilateral 12/10/2019    OTHER SURGICAL HISTORY  10/19/2016    op lap    VENTRAL HERNIA REPAIR N/A 2022    LAPAROSCOPIC LYSIS OF ADHESIONS AND ABDOMINAL WALL MASS REMOVAL performed by Jennifer Johnston MD at [facility]      multiple revisions, most recent        Social History:  Social History     Socioeconomic History    Marital status: Single     Spouse name: Not on file    Number of children: Not on file    Years of education: Not on file    Highest education level: Not on file   Occupational History    Occupation: disability, SSI    Tobacco Use    Smoking status: Every Day     Packs/day: 0.50     Years: 18.00     Pack years: 9.00     Types: Cigarettes    Smokeless tobacco: Never   Vaping Use    Vaping Use: Never used   Substance and Sexual Activity    Alcohol use: No     Alcohol/week: 0.0 standard drinks    Drug use: Never    Sexual activity: Not Currently     Partners: Male   Other Topics Concern    Not on file   Social History Narrative    Not on file     Social Determinants of Health     Financial Resource Strain: Not on file Food Insecurity: Not on file   Transportation Needs: Not on file   Physical Activity: Not on file   Stress: Not on file   Social Connections: Not on file   Intimate Partner Violence: Not on file   Housing Stability: Not on file       Family History:  Family History   Problem Relation Age of Onset    High Blood Pressure Mother     Diabetes Mother     High Cholesterol Mother     Depression Mother     Diabetes Maternal Grandmother     High Blood Pressure Maternal Grandmother     High Cholesterol Maternal Grandmother     Heart Disease Maternal Grandfather     High Blood Pressure Maternal Grandfather     Heart Disease Paternal Grandmother     Stroke Paternal Grandfather     Depression Sister     Cirrhosis Father     Rheum Arthritis Neg Hx     Osteoarthritis Neg Hx     Asthma Neg Hx     Breast Cancer Neg Hx     Cancer Neg Hx     Heart Failure Neg Hx     Hypertension Neg Hx     Migraines Neg Hx     Ovarian Cancer Neg Hx     Rashes/Skin Problems Neg Hx     Seizures Neg Hx     Thyroid Disease Neg Hx        Meds:   Current Facility-Administered Medications: lidocaine PF 1 % injection 5 mL, 5 mL, IntraDERmal, Once  sodium chloride flush 0.9 % injection 5-40 mL, 5-40 mL, IntraVENous, 2 times per day  sodium chloride flush 0.9 % injection 5-40 mL, 5-40 mL, IntraVENous, PRN  0.9 % sodium chloride infusion, 25 mL, IntraVENous, PRN  diazePAM (VALIUM) tablet 2 mg, 2 mg, Oral, Nightly PRN  diphenhydrAMINE (BENADRYL) tablet 25 mg, 25 mg, Oral, Q8H PRN  diphenhydrAMINE (BENADRYL) tablet 50 mg, 50 mg, Oral, Nightly  gabapentin (NEURONTIN) capsule 400 mg, 400 mg, Oral, 4x Daily  glucose chewable tablet 16 g, 4 tablet, Oral, PRN  dextrose bolus 10% 125 mL, 125 mL, IntraVENous, PRN **OR** dextrose bolus 10% 250 mL, 250 mL, IntraVENous, PRN  glucagon (rDNA) injection 1 mg, 1 mg, SubCUTAneous, PRN  dextrose 10 % infusion, , IntraVENous, Continuous PRN  insulin lispro (HUMALOG) injection vial 0-8 Units, 0-8 Units, SubCUTAneous, TID WC  insulin lispro (HUMALOG) injection vial 0-4 Units, 0-4 Units, SubCUTAneous, Nightly  meropenem (MERREM) 1,000 mg in sodium chloride 0.9 % 100 mL IVPB (mini-bag), 1,000 mg, IntraVENous, Q8H  oxyCODONE-acetaminophen (PERCOCET) 5-325 MG per tablet 1 tablet, 1 tablet, Oral, Q4H PRN  promethazine (PHENERGAN) tablet 12.5 mg, 12.5 mg, Oral, TID PRN    Vitals:  /69   Pulse 62   Temp 98.5 °F (36.9 °C)   Resp 16   Ht 4' 11\" (1.499 m)   Wt 154 lb 8.7 oz (70.1 kg)   LMP 10/31/2016 (Exact Date)   SpO2 98%   BMI 31.21 kg/m²     Intake/Output Summary (Last 24 hours) at 2/3/2023 0909  Last data filed at 2/3/2023 0658  Gross per 24 hour   Intake 2559.48 ml   Output --   Net 2559.48 ml       Review of Systems:  10 Systems were reviewed and negative except as in HPI      Physical Exam:  General Appearance: Alert and oriented, angry, ripping out iv, appears stated age    Labs:  CBC   Lab Results   Component Value Date/Time    WBC 9.4 02/02/2023 03:47 PM    RBC 5.17 02/02/2023 03:47 PM    HGB 14.6 02/02/2023 03:47 PM    HCT 44.7 02/02/2023 03:47 PM    MCV 86.5 02/02/2023 03:47 PM    MCH 28.3 02/02/2023 03:47 PM    MCHC 32.7 02/02/2023 03:47 PM    RDW 16.6 02/02/2023 03:47 PM     02/02/2023 03:47 PM    MPV 7.9 02/02/2023 03:47 PM     BMP   Lab Results   Component Value Date/Time     02/02/2023 03:47 PM    K 3.6 02/02/2023 03:47 PM     02/02/2023 03:47 PM    CO2 22 02/02/2023 03:47 PM    BUN 7 02/02/2023 03:47 PM    CREATININE 0.6 02/02/2023 03:47 PM    GLUCOSE 103 02/02/2023 03:47 PM    CALCIUM 9.7 02/02/2023 03:47 PM       Urinalysis:   Lab Results   Component Value Date/Time    COLORU Yellow 02/02/2023 07:25 PM    GLUCOSEU Negative 02/02/2023 07:25 PM    GLUCOSEU NEGATIVE 10/08/2011 10:10 PM    BLOODU Negative 02/02/2023 07:25 PM    NITRU POSITIVE 02/02/2023 07:25 PM    LEUKOCYTESUR Negative 02/02/2023 07:25 PM       Imaging: Pertinent images and radiologist's report were reviewed independently  CT abdomen/pelvis 1/31/23  Impression   No CT evidence of obstructive uropathy or acute intra-abdominal pathology. Impression/Plan:   - 44y.o. female with flank pain, recently treated for ESBL E.coli UTI. Urine on admission was nitrite positive but no leuks or RBC present, rare bacteria  - Continue antibiotics- no indication of the kidney stone causing her pain. - Follow up with [facility] urology.     Obi Naik, FREDRICK - CNP 2/3/70096:09 AM

## 2023-02-03 NOTE — PROGRESS NOTES
Hospitalist Progress Note  Man Quarles MD      Name:  Naa Mariee /Age/Sex: 1980  (38 y.o. female)   MRN & CSN:  [id number] & [id number] Admission Date/Time: 2023  1:42 PM   Location:  [id number] PCP: [id number] Day: 2    Assessment and Plan:     Patient is a 81-year-old female with past medical history of kidney stones who presents to the hospital due to bilateral flank pain as well as blood in the urine. According to the patient she thinks she has urinary tract infection, she has history of urinary tract infections, she was recently treated with Macrobid. She recently had urine culture which came out positive for ESBL E. coli. Patient denies fevers chills however mentions she has bilateral flank pain 7/10 intensity, nonradiating. Patient otherwise denied nausea vomiting diarrhea. Patient mentions she had seizure the day before yesterday. Assessment  Bilateral flank pain, hematuria suspect secondary to UTI, nephrolithiasis  History of ESBL UTI  Diabetes mellitus  ADHD     Plan  Continue  IV meropenem. ? Colonization. The patient's PCP get hold of the nursing staff and wanted the patient evaluated by ID before discharge. IV fluids  the patient was refusing to be evaluated by our urology group. Went with urology NP and the patient refused to see her. At this time no further management per urology. Insulin sliding scale  Follow-up on blood culture, urine culture  DVT prophylaxis-Lovenox  Diet: No diet orders on file  Code Status: Prior    Subjective:     Patient was lying comfortably in the bed. She was complaining of pain all over her abdomen. No burning micturition. Later on once again the patient was ambulating in the room. She refused to see urology. Urology does not think the patient needs antibiotics and her UA was clean.   Discussed that she will be discharged she started cursing and call her PCP    Ten point ROSA reviewed negative, unless as noted above    Objective: Intake/Output Summary (Last 24 hours) at 2/3/2023 0948  Last data filed at 2/3/2023 0658  Gross per 24 hour   Intake 2559.48 ml   Output --   Net 2559.48 ml        Vitals: /69   Pulse 62   Temp 98.5 °F (36.9 °C)   Resp 16   Ht 4' 11\" (1.499 m)   Wt 154 lb 8.7 oz (70.1 kg)   LMP 10/31/2016 (Exact Date)   SpO2 98%   BMI 31.21 kg/m²     Physical Exam:     GEN No acute distress. HEENT moist mucous membranes, no icterus  RESP CTA B  CVS:   RRR  GI/ S/NT/ND BS+   MSK No gross joint deformities. SKIN Normal coloration, warm, dry. NEURO no focal deficit. Agitated and cursing  PSYCH Awake, alert, oriented x3.     Recent Results (from the past 24 hour(s))   CBC with Auto Differential    Collection Time: 02/02/23  3:47 PM   Result Value Ref Range    WBC 9.4 4.0 - 11.0 K/uL    RBC 5.17 4.00 - 5.20 M/uL    Hemoglobin 14.6 12.0 - 16.0 g/dL    Hematocrit 44.7 36.0 - 48.0 %    MCV 86.5 80.0 - 100.0 fL    MCH 28.3 26.0 - 34.0 pg    MCHC 32.7 31.0 - 36.0 g/dL    RDW 16.6 (H) 12.4 - 15.4 %    Platelets 101 800 - 741 K/uL    MPV 7.9 5.0 - 10.5 fL    Neutrophils % 74.7 %    Lymphocytes % 19.2 %    Monocytes % 4.6 %    Eosinophils % 0.8 %    Basophils % 0.7 %    Neutrophils Absolute 7.0 1.7 - 7.7 K/uL    Lymphocytes Absolute 1.8 1.0 - 5.1 K/uL    Monocytes Absolute 0.4 0.0 - 1.3 K/uL    Eosinophils Absolute 0.1 0.0 - 0.6 K/uL    Basophils Absolute 0.1 0.0 - 0.2 K/uL   BMP w/ Reflex to MG    Collection Time: 02/02/23  3:47 PM   Result Value Ref Range    Sodium 138 136 - 145 mmol/L    Potassium reflex Magnesium 3.6 3.5 - 5.1 mmol/L    Chloride 102 99 - 110 mmol/L    CO2 22 21 - 32 mmol/L    Anion Gap 14 3 - 16    Glucose 103 (H) 70 - 99 mg/dL    BUN 7 7 - 20 mg/dL    Creatinine 0.6 0.6 - 1.1 mg/dL    Est, Glom Filt Rate >60 >60    Calcium 9.7 8.3 - 10.6 mg/dL   Lactate, Sepsis    Collection Time: 02/02/23  3:47 PM   Result Value Ref Range    Lactic Acid, Sepsis 1.4 0.4 - 1.9 mmol/L   Urinalysis with Reflex to Culture    Collection Time: 02/02/23  7:25 PM    Specimen: Urine   Result Value Ref Range    Color, UA Yellow Straw/Yellow    Clarity, UA Clear Clear    Glucose, Ur Negative Negative mg/dL    Bilirubin Urine Negative Negative    Ketones, Urine Negative Negative mg/dL    Specific Gravity, UA 1.010 1.005 - 1.030    Blood, Urine Negative Negative    pH, UA 6.0 5.0 - 8.0    Protein, UA Negative Negative mg/dL    Urobilinogen, Urine 0.2 <2.0 E.U./dL    Nitrite, Urine POSITIVE (A) Negative    Leukocyte Esterase, Urine Negative Negative    Microscopic Examination YES     Urine Type NotGiven     Urine Reflex to Culture Not Indicated    Microscopic Urinalysis    Collection Time: 02/02/23  7:25 PM   Result Value Ref Range    Bacteria, UA Rare (A) None Seen /HPF    Hyaline Casts, UA 0 0 - 8 /LPF    WBC, UA 2 0 - 5 /HPF    RBC, UA 1 0 - 4 /HPF    Epithelial Cells, UA 2 0 - 5 /HPF   POCT Glucose    Collection Time: 02/02/23  8:12 PM   Result Value Ref Range    POC Glucose 91 70 - 99 mg/dl    Performed on ACCU-CHEK    Lactate, Sepsis    Collection Time: 02/02/23  9:25 PM   Result Value Ref Range    Lactic Acid, Sepsis 0.9 0.4 - 1.9 mmol/L   Hemoglobin A1c    Collection Time: 02/02/23  9:25 PM   Result Value Ref Range    Hemoglobin A1C 6.1 See comment %    eAG 128.4 mg/dL   POCT Glucose    Collection Time: 02/03/23  7:13 AM   Result Value Ref Range    POC Glucose 127 (H) 70 - 99 mg/dl    Performed on ACCU-CHEK        Medications:   Medications:    lidocaine 1 % injection  5 mL IntraDERmal Once    sodium chloride flush  5-40 mL IntraVENous 2 times per day    diphenhydrAMINE  50 mg Oral Nightly    gabapentin  400 mg Oral 4x Daily    insulin lispro  0-8 Units SubCUTAneous TID WC    insulin lispro  0-4 Units SubCUTAneous Nightly    meropenem  1,000 mg IntraVENous Q8H      Infusions:    sodium chloride      dextrose       PRN Meds: oxyCODONE-acetaminophen, 1 tablet, Q8H PRN  sodium chloride flush, 5-40 mL, PRN  sodium chloride, 25 mL, PRN  diazePAM, 2 mg, Nightly PRN  glucose, 4 tablet, PRN  dextrose bolus, 125 mL, PRN   Or  dextrose bolus, 250 mL, PRN  glucagon (rDNA), 1 mg, PRN  dextrose, , Continuous PRN  promethazine, 12.5 mg, TID PRN          Electronically signed by Fredy Savage MD, MD on 2/3/2023 at 9:48 AM

## 2023-02-03 NOTE — TELEPHONE ENCOUNTER
Pt called to ask Dr Bryan Estes if he would return call he call [phone]. Pt states that [facility] released her and made her sign a form. She was released on her own. She did not want to stay because they would not give her the med's she takes at home. They wanted her to stay to get antibiotics meds by IV until Monday.

## 2023-02-04 PROBLEM — N20.0 RENAL STONES: Status: ACTIVE | Noted: 2023-02-04

## 2023-02-04 PROBLEM — Z16.24 MULTIPLE DRUG RESISTANT ORGANISM (MDRO) CULTURE POSITIVE: Status: ACTIVE | Noted: 2023-02-04

## 2023-02-04 LAB — URINE CULTURE, ROUTINE: NORMAL

## 2023-02-06 ENCOUNTER — TELEPHONE (OUTPATIENT)
Dept: FAMILY MEDICINE CLINIC | Age: 43
End: 2023-02-06

## 2023-02-06 LAB
BLOOD CULTURE, ROUTINE: NORMAL
CULTURE, BLOOD 2: NORMAL

## 2023-02-06 NOTE — TELEPHONE ENCOUNTER
FYI    Pt called to let Dr Marie Malik know that she left [facility] on 2/3, because they would not give her her seizure medication. She ended up having a seizure when she got home on 2/3, ambulance took pt to [facility] where she is still at. She wanted to thank Dr Marie Malik for all he did (pushing [facility] to give medication/keep her there) when she was at [facility].     Again she wanted to Thank Dr Marie Malik

## 2023-02-16 ENCOUNTER — TELEPHONE (OUTPATIENT)
Dept: PRIMARY CARE CLINIC | Age: 43
End: 2023-02-16

## 2023-02-16 DIAGNOSIS — M48.02 CERVICAL SPINAL STENOSIS: Primary | ICD-10-CM

## 2023-02-16 DIAGNOSIS — Z98.2 VP (VENTRICULOPERITONEAL) SHUNT STATUS: ICD-10-CM

## 2023-02-16 NOTE — TELEPHONE ENCOUNTER
Pt called and left vm stating that her neurosurgery from [facility] referred her to [facility] and she called them to get scheduled but that [facility] is needing the referral from the pcp. She stated on the vm what the referral was for but I could not understand what she said. Fax # [phone]    Per the note in [facility] on 2/14/2023. By neelam liz cnp   Referral to Dr. Darrion Davila for shunt evaluation as well as cervical spine stenosis evaluation.

## 2023-02-28 ENCOUNTER — APPOINTMENT (OUTPATIENT)
Dept: CT IMAGING | Age: 43
End: 2023-02-28
Payer: COMMERCIAL

## 2023-02-28 ENCOUNTER — HOSPITAL ENCOUNTER (EMERGENCY)
Age: 43
Discharge: HOME | End: 2023-02-28
Attending: EMERGENCY MEDICINE
Payer: COMMERCIAL

## 2023-02-28 VITALS
WEIGHT: 133.16 LBS | BODY MASS INDEX: 26.84 KG/M2 | HEIGHT: 59 IN | DIASTOLIC BLOOD PRESSURE: 89 MMHG | RESPIRATION RATE: 14 BRPM | OXYGEN SATURATION: 97 % | HEART RATE: 99 BPM | TEMPERATURE: 98.6 F | SYSTOLIC BLOOD PRESSURE: 131 MMHG

## 2023-02-28 DIAGNOSIS — R11.2 NAUSEA AND VOMITING, UNSPECIFIED VOMITING TYPE: ICD-10-CM

## 2023-02-28 DIAGNOSIS — R51.9 ACUTE INTRACTABLE HEADACHE, UNSPECIFIED HEADACHE TYPE: Primary | ICD-10-CM

## 2023-02-28 LAB
A/G RATIO: 1.2 (ref 1.1–2.2)
ALBUMIN SERPL-MCNC: 4.8 G/DL (ref 3.4–5)
ALP BLD-CCNC: 144 U/L (ref 40–129)
ALT SERPL-CCNC: 32 U/L (ref 10–40)
ANION GAP SERPL CALCULATED.3IONS-SCNC: 17 MMOL/L (ref 3–16)
AST SERPL-CCNC: 13 U/L (ref 15–37)
BACTERIA: NORMAL /HPF
BASOPHILS ABSOLUTE: 0.1 K/UL (ref 0–0.2)
BASOPHILS RELATIVE PERCENT: 0.8 %
BILIRUB SERPL-MCNC: 0.5 MG/DL (ref 0–1)
BILIRUBIN URINE: NEGATIVE
BLOOD, URINE: NEGATIVE
BUN BLDV-MCNC: 9 MG/DL (ref 7–20)
CALCIUM SERPL-MCNC: 10.3 MG/DL (ref 8.3–10.6)
CHLORIDE BLD-SCNC: 101 MMOL/L (ref 99–110)
CLARITY: CLEAR
CO2: 22 MMOL/L (ref 21–32)
COLOR: YELLOW
CREAT SERPL-MCNC: 0.6 MG/DL (ref 0.6–1.1)
EOSINOPHILS ABSOLUTE: 0 K/UL (ref 0–0.6)
EOSINOPHILS RELATIVE PERCENT: 0.3 %
EPITHELIAL CELLS, UA: 0 /HPF (ref 0–5)
GFR SERPL CREATININE-BSD FRML MDRD: >60 ML/MIN/{1.73_M2}
GLUCOSE BLD-MCNC: 132 MG/DL (ref 70–99)
GLUCOSE URINE: NEGATIVE MG/DL
HCT VFR BLD CALC: 47.8 % (ref 36–48)
HEMOGLOBIN: 16.1 G/DL (ref 12–16)
HYALINE CASTS: 0 /LPF (ref 0–8)
KETONES, URINE: NEGATIVE MG/DL
LACTIC ACID, SEPSIS: 1.2 MMOL/L (ref 0.4–1.9)
LEUKOCYTE ESTERASE, URINE: NEGATIVE
LYMPHOCYTES ABSOLUTE: 1.3 K/UL (ref 1–5.1)
LYMPHOCYTES RELATIVE PERCENT: 13 %
MCH RBC QN AUTO: 29 PG (ref 26–34)
MCHC RBC AUTO-ENTMCNC: 33.8 G/DL (ref 31–36)
MCV RBC AUTO: 85.8 FL (ref 80–100)
MICROSCOPIC EXAMINATION: NORMAL
MONOCYTES ABSOLUTE: 0.3 K/UL (ref 0–1.3)
MONOCYTES RELATIVE PERCENT: 3.3 %
NEUTROPHILS ABSOLUTE: 8.1 K/UL (ref 1.7–7.7)
NEUTROPHILS RELATIVE PERCENT: 82.6 %
NITRITE, URINE: NEGATIVE
PDW BLD-RTO: 15.8 % (ref 12.4–15.4)
PH UA: 7 (ref 5–8)
PLATELET # BLD: 279 K/UL (ref 135–450)
PMV BLD AUTO: 7.9 FL (ref 5–10.5)
POTASSIUM REFLEX MAGNESIUM: 3.8 MMOL/L (ref 3.5–5.1)
PROTEIN UA: NEGATIVE MG/DL
RBC # BLD: 5.57 M/UL (ref 4–5.2)
RBC UA: 0 /HPF (ref 0–4)
SODIUM BLD-SCNC: 140 MMOL/L (ref 136–145)
SPECIFIC GRAVITY UA: 1 (ref 1–1.03)
TOTAL PROTEIN: 8.8 G/DL (ref 6.4–8.2)
URINE TYPE: NORMAL
UROBILINOGEN, URINE: 0.2 E.U./DL
WBC # BLD: 9.9 K/UL (ref 4–11)
WBC UA: 0 /HPF (ref 0–5)

## 2023-02-28 PROCEDURE — 85025 COMPLETE CBC W/AUTO DIFF WBC: CPT

## 2023-02-28 PROCEDURE — 99284 EMERGENCY DEPT VISIT MOD MDM: CPT

## 2023-02-28 PROCEDURE — 80175 DRUG SCREEN QUAN LAMOTRIGINE: CPT

## 2023-02-28 PROCEDURE — 6370000000 HC RX 637 (ALT 250 FOR IP): Performed by: PHYSICIAN ASSISTANT

## 2023-02-28 PROCEDURE — 96375 TX/PRO/DX INJ NEW DRUG ADDON: CPT

## 2023-02-28 PROCEDURE — 6360000002 HC RX W HCPCS: Performed by: PHYSICIAN ASSISTANT

## 2023-02-28 PROCEDURE — 70450 CT HEAD/BRAIN W/O DYE: CPT

## 2023-02-28 PROCEDURE — 2580000003 HC RX 258: Performed by: PHYSICIAN ASSISTANT

## 2023-02-28 PROCEDURE — 83605 ASSAY OF LACTIC ACID: CPT

## 2023-02-28 PROCEDURE — 80053 COMPREHEN METABOLIC PANEL: CPT

## 2023-02-28 PROCEDURE — 81001 URINALYSIS AUTO W/SCOPE: CPT

## 2023-02-28 PROCEDURE — 96365 THER/PROPH/DIAG IV INF INIT: CPT

## 2023-02-28 RX ORDER — OXYCODONE HYDROCHLORIDE AND ACETAMINOPHEN 5; 325 MG/1; MG/1
1 TABLET ORAL ONCE
Status: COMPLETED | OUTPATIENT
Start: 2023-02-28 | End: 2023-02-28

## 2023-02-28 RX ORDER — PROMETHAZINE HYDROCHLORIDE 25 MG/1
25 TABLET ORAL EVERY 6 HOURS PRN
Qty: 20 TABLET | Refills: 0 | Status: SHIPPED | OUTPATIENT
Start: 2023-02-28 | End: 2023-03-07

## 2023-02-28 RX ORDER — PROMETHAZINE HYDROCHLORIDE 25 MG/1
25 SUPPOSITORY RECTAL EVERY 6 HOURS PRN
Qty: 20 SUPPOSITORY | Refills: 0 | Status: SHIPPED | OUTPATIENT
Start: 2023-02-28 | End: 2023-03-07

## 2023-02-28 RX ORDER — ONDANSETRON 2 MG/ML
4 INJECTION INTRAMUSCULAR; INTRAVENOUS ONCE
Status: COMPLETED | OUTPATIENT
Start: 2023-02-28 | End: 2023-02-28

## 2023-02-28 RX ORDER — ACETAMINOPHEN 325 MG/1
650 TABLET ORAL ONCE
Status: COMPLETED | OUTPATIENT
Start: 2023-02-28 | End: 2023-02-28

## 2023-02-28 RX ORDER — ONDANSETRON 4 MG/1
4 TABLET, FILM COATED ORAL EVERY 8 HOURS PRN
Qty: 20 TABLET | Refills: 0 | Status: SHIPPED | OUTPATIENT
Start: 2023-02-28 | End: 2023-02-28

## 2023-02-28 RX ORDER — 0.9 % SODIUM CHLORIDE 0.9 %
1000 INTRAVENOUS SOLUTION INTRAVENOUS ONCE
Status: COMPLETED | OUTPATIENT
Start: 2023-02-28 | End: 2023-02-28

## 2023-02-28 RX ORDER — FENTANYL CITRATE 50 UG/ML
50 INJECTION, SOLUTION INTRAMUSCULAR; INTRAVENOUS ONCE
Status: COMPLETED | OUTPATIENT
Start: 2023-02-28 | End: 2023-02-28

## 2023-02-28 RX ADMIN — SODIUM CHLORIDE 1000 ML: 9 INJECTION, SOLUTION INTRAVENOUS at 15:34

## 2023-02-28 RX ADMIN — ACETAMINOPHEN 325MG 650 MG: 325 TABLET ORAL at 18:39

## 2023-02-28 RX ADMIN — Medication 25 MG: at 15:33

## 2023-02-28 RX ADMIN — ONDANSETRON 4 MG: 2 INJECTION INTRAMUSCULAR; INTRAVENOUS at 17:26

## 2023-02-28 RX ADMIN — FENTANYL CITRATE 50 MCG: 50 INJECTION, SOLUTION INTRAMUSCULAR; INTRAVENOUS at 15:34

## 2023-02-28 RX ADMIN — OXYCODONE AND ACETAMINOPHEN 1 TABLET: 5; 325 TABLET ORAL at 18:39

## 2023-02-28 ASSESSMENT — PAIN SCALES - GENERAL
PAINLEVEL_OUTOF10: 10
PAINLEVEL_OUTOF10: 8
PAINLEVEL_OUTOF10: 9
PAINLEVEL_OUTOF10: 9

## 2023-02-28 ASSESSMENT — PAIN DESCRIPTION - LOCATION
LOCATION: HEAD

## 2023-02-28 ASSESSMENT — PAIN DESCRIPTION - DESCRIPTORS
DESCRIPTORS: CRUSHING;DISCOMFORT
DESCRIPTORS: PRESSURE
DESCRIPTORS: CRAMPING;DISCOMFORT

## 2023-02-28 ASSESSMENT — ENCOUNTER SYMPTOMS
SORE THROAT: 0
EYE REDNESS: 0
EYE PAIN: 0
VOMITING: 1
CONSTIPATION: 0
DIARRHEA: 0
NAUSEA: 1
ABDOMINAL PAIN: 0
BACK PAIN: 0
COUGH: 0
SHORTNESS OF BREATH: 0

## 2023-02-28 ASSESSMENT — PAIN - FUNCTIONAL ASSESSMENT: PAIN_FUNCTIONAL_ASSESSMENT: 0-10

## 2023-02-28 NOTE — ED TRIAGE NOTES
Patient states she has hydrocephalus and has a shunt. Pt reports vomiting, seizure (on Lamictal), and fevers. Pt states she was inpatient at [facility] recently, discharged this past Thursday. Stating she has 9/10 head pressure.

## 2023-02-28 NOTE — ED PROVIDER NOTES
ED Attending Attestation Note    This patient was seen by the advanced practice provider. I personally saw the patient and performed a substantive portion of the visit including all aspects of the medical decision making. Briefly, 38 y.o. female who  has a past medical history of ADHD (attention deficit hyperactivity disorder), Depression with anxiety, Diabetes mellitus ([address] Difficult intubation, Encounter for imaging to screen for metal prior to MRI, ESBL (extended spectrum beta-lactamase) producing bacteria infection, Functional ovarian cysts, Headache(784.0), History of blood transfusion, History of kidney stones, History of PCOS, Hydrocephalus ([address] Hyperlipidemia, Irritable bowel syndrome, Meningitis, Neutrophilic leukocytosis, Nicotine dependence, PONV (postoperative nausea and vomiting), Primary osteoarthritis of left knee, S/P cone biopsy of cervix, Scoliosis, Seizures ([address]  (ventriculoperitoneal) shunt status, and Wears glasses. presents with complaints of nausea and vomiting that has been present over the past 4 days. Patient states has been having difficulty taking her seizure medications secondary to nausea and vomiting. States she has had 4 seizures over the past week. Denies any documented fevers. States that she supposed to follow-up with neurosurgery for her  shunt over shunting but has yet to follow-up with them. No chest pain or shortness of breath. No cough or sputum production. Normal urinary and bowel habits. Focused exam:   Gen: 38 y.o. female, NAD  HEENT: NCAT. PERRL. EOMI. CV: RRR w/o MRG  Lungs: CTAB. No incr WOB. Abdomen: Soft, nontender, nondistended. No rebound/guarding. Neuro: GCS 15, cranial nerves II through XII intact, 5/5 strength throughout, light touch sensation grossly intact, no dysarthria, no aphasia, no facial droop    MDM:   Patient seen and evaluated. History and physical as above. Nontoxic and afebrile.   Patient presents with chronic symptoms of headache. Does have  shunt and CT head today is unremarkable with no acute findings. Patient is post to follow-up with neurosurgery at [facility] but has yet to follow-up with them. No focal deficits on exam.  No toxicity. Patient provided antiemetics and pain control. No vomiting here in the emergency room. Plan for discharge with outpatient follow-up. Reviewed patient's MRI from 2/20/2023 and it shows that she does have near complete collapse of the ventricles suspicious for over shunting. Her CT of her head without any acute changes today but does show small ventricles. For further details of the patient's emergency department visit, please see the advanced practice provider's documentation. Sharif Clemente MD     This report has been produced using speech recognition software and may contain errors related to that system including errors in grammar, punctuation, and spelling, as well as words and phrases that may be inappropriate. If there are any questions or concerns please feel free to contact the dictating provider for clarification.         Sharif Clemente MD  02/28/23 2032

## 2023-02-28 NOTE — ED NOTES
Pt requesting to speak to the EDPA about her pain medication and antiemetic.       Emani Cummings RN  02/28/23 2289

## 2023-02-28 NOTE — ED PROVIDER NOTES
[address] [facility]        Pt Name: Jaiden Eubanks  MRN: [id number]  [place] 1980  Date of evaluation: 2/28/2023  Provider: April Barajas PA-C  PCP: FREDRICK Goncalves CNP  Note Started: 3:21 PM EST 2/28/23       I have seen and evaluated this patient with my supervising physician Martha Green MD.      [address] [place] [address]       Chief Complaint   Patient presents with    Emesis     Patient states she has hydrocephalus and has a shunt. Pt reports vomiting, seizure (on Lamictal), and fevers. Pt states she was inpatient at [facility] [place] recently, discharged this past Thursday. HISTORY OF PRESENT ILLNESS: 1 or more Elements             Jaiden Eubanks is a 38 y.o. female who presents to the emergency department with complaint of vomiting for the past 4 days. Says that she has been having difficulty taking her pain medication as well as her seizure medication. States that over the past week she has had about 4 seizures which is abnormal for her. Usually she has 1-2 a month. Yesterday evening she endorses that she started getting a worsening headache, which she describes as \"pressure, not necessarily a headache that radiates from her forehead midline all the way to the back of her neck. She is having some tenderness in her neck as well. She says that she has been having fevers over the past 4 days, but not been taking anything for it. She denies any sore throat, congestion, cough, chest pain, shortness of breath, abdominal pain, trouble with urination or bowel movements. She does endorse decreased urination as she has not been drinking a lot of fluid. Nursing Notes were all reviewed and agreed with or any disagreements were addressed in the HPI. REVIEW OF SYSTEMS :      Review of Systems   Constitutional:  Negative for chills and fever. HENT:  Negative for ear pain and sore throat. Eyes:  Negative for pain and redness. Respiratory:  Negative for cough and shortness of breath. Cardiovascular:  Negative for chest pain and leg swelling. Gastrointestinal:  Positive for nausea and vomiting. Negative for abdominal pain, constipation and diarrhea. Genitourinary:  Negative for dysuria and hematuria. Musculoskeletal:  Negative for back pain and neck pain. Skin:  Negative for rash and wound. Neurological:  Positive for light-headedness and headaches. Positives and Pertinent negatives as per HPI. SURGICAL HISTORY     Past Surgical History:   Procedure Laterality Date    ABDOMEN SURGERY N/A 2021    REMOVAL OF ABDOMINAL WALL MASS performed by Flaqiuto Foster MD at [facility]      appendicitis     SECTION  2005    placenta previa    CHOLECYSTECTOMY      COLONOSCOPY      COLPOSCOPY      CSF SHUNT      replaced at age 17    CYSTOSCOPY      stone removal    HEMORRHOID SURGERY      HERNIA REPAIR Bilateral     inguinal    HERNIA REPAIR      hiatal hernia    HYSTERECTOMY, TOTAL ABDOMINAL (CERVIX REMOVED)  2016    TAHBSO, adhesions/PCOS    KNEE ARTHROSCOPY Left 2015    medial meniscectomy, chondroplasty, plica resection    LITHOTRIPSY Bilateral 12/10/2019    OTHER SURGICAL HISTORY  10/19/2016    op lap    VENTRAL HERNIA REPAIR N/A 2022    LAPAROSCOPIC LYSIS OF ADHESIONS AND ABDOMINAL WALL MASS REMOVAL performed by Flaquito Foster MD at [place] [address]      multiple revisions, most recent        [address]       Discharge Medication List as of 2023  6:57 PM        CONTINUE these medications which have NOT CHANGED    Details   !! diphenhydrAMINE (BENADRYL ALLERGY) 25 MG tablet Take 1 tablet by mouth every 8 hours as needed for Itching, Disp-20 tablet, R-0Print      oxyCODONE-acetaminophen (PERCOCET) 5-325 MG per tablet Take 1 tablet by mouth daily as needed for Pain. Historical Med      !! diphenhydrAMINE (BENADRYL) 25 MG tablet Take 50 mg by mouth at bedtimeHistorical Med      lacosamide (VIMPAT) 50 MG TABS tablet Take 1 tablet by mouth 2 times daily for 15 days. , Disp-30 tablet, R-0Print      diazePAM (VALIUM) 2 MG tablet Take 2 mg by mouth nightly as needed. Historical Med      gabapentin (NEURONTIN) 400 MG capsule Take 400 mg by mouth 4 times daily. Historical Med       !! - Potential duplicate medications found. Please discuss with provider.           ALLERGIES     Bee venom, Bentyl [dicyclomine hcl], Dicyclomine, Ketorolac tromethamine, Levofloxacin, Maitake, Shiitake mushroom, Sulfa antibiotics, Vancomycin, Zosyn [piperacillin sod-tazobactam so], Adhesive tape, Sulfacetamide, Zofran [ondansetron], Acetaminophen, Butalbital-apap-caff-cod, Ceftaroline, Daptomycin, Haldol [haloperidol], Keppra [levetiracetam], Ketamine, Methocarbamol, Morphine, Nitrofurantoin, Prochlorperazine, Reglan [metoclopramide], Silicone, and Tazobactam    FAMILYHISTORY       Family History   Problem Relation Age of Onset    High Blood Pressure Mother     Diabetes Mother     High Cholesterol Mother     Depression Mother     Diabetes Maternal Grandmother     High Blood Pressure Maternal Grandmother     High Cholesterol Maternal Grandmother     Heart Disease Maternal Grandfather     High Blood Pressure Maternal Grandfather     Heart Disease Paternal Grandmother     Stroke Paternal Grandfather     Depression Sister     Cirrhosis Father     Rheum Arthritis Neg Hx     Osteoarthritis Neg Hx     Asthma Neg Hx     Breast Cancer Neg Hx     Cancer Neg Hx     Heart Failure Neg Hx     Hypertension Neg Hx     Migraines Neg Hx     Ovarian Cancer Neg Hx     Rashes/Skin Problems Neg Hx     Seizures Neg Hx     Thyroid Disease Neg Hx         SOCIAL HISTORY       Social History     Tobacco Use    Smoking status: Every Day     Packs/day: 0.50     Years: 18.00     Pack years: 9.00     Types: Cigarettes    Smokeless tobacco: Never   Vaping Use    Vaping Use: Never used   Substance Use Topics    Alcohol use: No     Alcohol/week: 0.0 standard drinks    Drug use: Never       SCREENINGS        Aden Coma Scale  Eye Opening: Spontaneous  Best Verbal Response: Oriented  Best Motor Response: Obeys commands  Aden Coma Scale Score: 15                CIWA Assessment  BP: 131/89  Heart Rate: 99           PHYSICAL EXAM  1 or more Elements     ED Triage Vitals [02/28/23 1423]   BP Temp Temp Source Heart Rate Resp SpO2 Height Weight   (!) 144/97 98.6 °F (37 °C) Oral (!) 122 18 99 % 4' 11\" (1.499 m) 133 lb 2.5 oz (60.4 kg)       Physical Exam  Constitutional:       General: She is not in acute distress.     Appearance: Normal appearance. She is not ill-appearing, toxic-appearing or diaphoretic.   HENT:      Head: Normocephalic and atraumatic.      Right Ear: External ear normal.      Left Ear: External ear normal.      Nose: Nose normal.      Mouth/Throat:      Mouth: Mucous membranes are moist.      Pharynx: Oropharynx is clear. No oropharyngeal exudate.   Eyes:      General: No visual field deficit.        Right eye: No discharge.         Left eye: No discharge.   Cardiovascular:      Rate and Rhythm: Normal rate and regular rhythm.      Pulses: Normal pulses.      Heart sounds: Normal heart sounds. No murmur heard.    No gallop.   Pulmonary:      Effort: Pulmonary effort is normal. No respiratory distress.      Breath sounds: Normal breath sounds. No stridor. No wheezing, rhonchi or rales.   Musculoskeletal:         General: Normal range of motion.      Cervical back: Normal range of motion. Tenderness (base of skull more than neck) present. No rigidity.   Lymphadenopathy:      Cervical: No cervical adenopathy.   Skin:     General: Skin is warm and dry.   Neurological:      General: No focal deficit present.      Mental Status: She is alert and oriented to person, place, and time.      Cranial Nerves: No cranial nerve deficit, dysarthria or facial asymmetry.      Motor: No weakness or  pronator drift. Coordination: Finger-Nose-Finger Test and Heel to [employer] normal.      Gait: Gait normal.      Comments: Patient with full active range of motion and strength is resistance  Normal sensation in all 4 extremities    FAROM and PAOLO in neck   Psychiatric:         Mood and Affect: Mood normal.         Behavior: Behavior normal.       DIAGNOSTIC RESULTS   LABS:    Labs Reviewed   CBC WITH AUTO DIFFERENTIAL - Abnormal; Notable for the following components:       Result Value    RBC 5.57 (*)     Hemoglobin 16.1 (*)     RDW 15.8 (*)     Neutrophils Absolute 8.1 (*)     All other components within normal limits   COMPREHENSIVE METABOLIC PANEL W/ REFLEX TO MG FOR LOW K - Abnormal; Notable for the following components:    Anion Gap 17 (*)     Glucose 132 (*)     Total Protein 8.8 (*)     Alkaline Phosphatase 144 (*)     AST 13 (*)     All other components within normal limits   LACTATE, SEPSIS   URINALYSIS WITH MICROSCOPIC   LAMOTRIGINE LEVEL   LACTATE, SEPSIS       When ordered only abnormal lab results are displayed. All other labs were within normal range or not returned as of this dictation. EKG: When ordered, EKG's are interpreted by the Emergency Department Physician in the absence of a cardiologist.  Please see their note for interpretation of EKG. RADIOLOGY:   Non-plain film images such as CT, Ultrasound and MRI are read by the radiologist. Plain radiographic images are visualized and preliminarily interpreted by the ED Provider with the below findings:        Interpretation per the Radiologist below, if available at the time of this note:    CT HEAD WO CONTRAST   Final Result   No acute intracranial abnormality. No results found. No results found.     PROCEDURES   Unless otherwise noted below, none     Procedures    CRITICAL CARE TIME (.cctime)   CRITICAL CARE NOTE:          PAST MEDICAL HISTORY      has a past medical history of ADHD (attention deficit hyperactivity disorder) (1988), Depression with anxiety, Diabetes mellitus ([facility] Difficult intubation, Encounter for imaging to screen for metal prior to MRI (06/01/2021), ESBL (extended spectrum beta-lactamase) producing bacteria infection (11/06/2019), Functional ovarian cysts (2008), Headache(784.0), History of blood transfusion, History of kidney stones, History of PCOS, Hydrocephalus ([facility] Hyperlipidemia, Irritable bowel syndrome (2004), 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 Chronic Conditions affecting Care:     EMERGENCY DEPARTMENT COURSE and DIFFERENTIAL DIAGNOSIS/MDM:   Vitals:    Vitals:    02/28/23 1515 02/28/23 1545 02/28/23 1845 02/28/23 1900   BP: (!) 130/97  127/82 131/89   Pulse: (!) 107 98 94 99   Resp: 19 11 15 14   Temp:       TempSrc:       SpO2: 99% 99% 98% 97%   Weight:       Height:           Patient was given the following medications:  Medications   fentaNYL (SUBLIMAZE) injection 50 mcg (50 mcg IntraVENous Given 2/28/23 1534)   promethazine (PHENERGAN) in sodium chloride 0.9% 50 mL IVPB SOLN 25 mg (0 mg IntraVENous Stopped 2/28/23 1611)   0.9 % sodium chloride bolus (0 mLs IntraVENous Stopped 2/28/23 1913)   ondansetron (ZOFRAN) injection 4 mg (4 mg IntraVENous Given 2/28/23 1726)   oxyCODONE-acetaminophen (PERCOCET) 5-325 MG per tablet 1 tablet (1 tablet Oral Given 2/28/23 1839)   acetaminophen (TYLENOL) tablet 650 mg (650 mg Oral Given 2/28/23 1839)             Is this patient to be included in the SEP-1 Core Measure due to severe sepsis or septic shock? No   Exclusion criteria - the patient is NOT to be included for SEP-1 Core Measure due to:   Infection is not suspected    CONSULTS: (Who and What was discussed)  None              CC/HPI Summary, DDx, ED Course, and Reassessment: This is a 38y.o. year old, well-appearing female with  has a past medical history of ADHD (attention deficit hyperactivity disorder), Depression with anxiety, Diabetes mellitus ([address] Difficult intubation, Encounter for imaging to screen for metal prior to MRI, ESBL (extended spectrum beta-lactamase) producing bacteria infection, Functional ovarian cysts, Headache(784.0), History of blood transfusion, History of kidney stones, History of PCOS, Hydrocephalus ([address] Hyperlipidemia, Irritable bowel syndrome, Meningitis, Neutrophilic leukocytosis, Nicotine dependence, PONV (postoperative nausea and vomiting), Primary osteoarthritis of left knee, S/P cone biopsy of cervix, Scoliosis, Seizures ([address]  (ventriculoperitoneal) shunt status, and Wears glasses. who presents to the ED with complaint of head pressure, lightheadedness, vomiting, unable to tolerate PO that began 4 days ago. Vitals upon arrival show tachycardia, htn, otherwise wnl. Physical Exam shows as above. Blood work was done and shows:   -hgb mildly elevated at 16.1  -mild elevation in alk phos 144, consistent with old    Imaging was reviewed and interpreted by radiologist as above showing:   -no acute findings  Imaging was independently reviewed by myself. Consults as above. Ddx includes: migraine,     Management Given:  -fentanyl IV, phenergan IV, zofran IV, NS IV, percocet PO, tylenol PO, symptoms improved    Disposition: discharge   Patient was informed to return to the Emergency Department if any new worsening or more concerning symptoms occur, in agreement with plan. Shared decision making was practiced, and patient discharged in stable condition. Informed to follow up with naurologist  for further evaluation. Medications were prescribed as below. All questions were answered.        Disposition Considerations (include 1 Tests not done, Shared Decision Making, Pt Expectation of Test or Tx.): Patient felt improved after receiving medication here. She did ask me for another prescription of her Percocet as she was prescribed oxycodone when she was at [facility] and says that this could be contributing to the headache she is having since she always takes a combination oxycodone-Tylenol. I told her that I would not prescribe this for her and she can reach back out to who prescribed it for her at [facility].  She goes on to tell me that her mother turned the bottle into the police and therefore she does not have it anymore. She does have a pain physician that she sees and gets prescribed her oxycodone, however states that they lowered her dose and feels as if it is not controlling her pain. Again I told her that I would not prescribe this again and she needs to call her pain management doctor tomorrow. She was in agreement. She felt much better. She was discharged in stable condition. I am the Primary Clinician of Record. FINAL IMPRESSION      1. Acute intractable headache, unspecified headache type    2. Nausea and vomiting, unspecified vomiting type          DISPOSITION/PLAN     DISPOSITION Decision To Discharge 02/28/2023 07:03:04 PM      PATIENT REFERRED TO:  [address] [place] [address]  [address] [facility]  [phone]    Schedule an appointment as soon as possible for a visit in 1 day  for reevaluation    [facility] Emergency Department  [address] [place] [address]  [phone]  Go in 1 week  As needed, If symptoms worsen    DISCHARGE MEDICATIONS:  Discharge Medication List as of 2/28/2023  6:57 PM        START taking these medications    Details   promethazine (PHENERGAN) 25 MG suppository Place 1 suppository rectally every 6 hours as needed for Nausea WARNING:  May cause drowsiness. May impair ability to operate vehicles or machinery.   Do not use in combination with alcohol., Disp-20 suppository, R-0Normal             DISCONTINUED MEDICATIONS:  Discharge Medication List as of 2/28/2023  6:57 PM                 (Please note that portions of this note were completed with a voice recognition program.  Efforts were made to edit the dictations but occasionally words are mis-transcribed.)    Anastasiya Albarran PA-C (electronically signed)           Anastasiya Albarran PA-C  02/28/23 9768

## 2023-03-01 NOTE — ED NOTES
Discharge and education instructions reviewed. Patient verbalized understanding, teach-back successful. Patient denied questions at this time. No acute distress noted. Patient instructed to follow-up as noted - return to emergency department if symptoms worsen. Patient verbalized understanding. Discharged per EDMD with discharged instructions.        Serenity Denis RN  02/28/23 7059

## 2023-03-02 LAB — LAMOTRIGINE LEVEL: <0.9 UG/ML (ref 3–15)

## 2023-03-02 NOTE — DISCHARGE INSTRUCTIONS
Your blood and urine tests today were reassuring with no signs of acute infection. Because of the urine retention you had we put a silva in and we want you to follow up with your urologist at [facility]. Follow up with your primary care as needed. Return to ED for any new or worsening symptoms or concerns. Adult

## 2023-03-04 PROBLEM — N39.0 UTI (URINARY TRACT INFECTION): Status: RESOLVED | Noted: 2023-02-02 | Resolved: 2023-03-04

## 2023-03-13 ENCOUNTER — TELEPHONE (OUTPATIENT)
Dept: FAMILY MEDICINE CLINIC | Age: 43
End: 2023-03-13

## 2023-03-13 NOTE — TELEPHONE ENCOUNTER
Patient requesting a return call from Dr Domingo Castillo.  Patient states Dr Domingo Castillo always has her back and she needs to fill him in on what is currently going on with her in case she doesn't make it through next procedure

## 2023-04-07 PROBLEM — K57.92 DIVERTICULITIS: Status: ACTIVE | Noted: 2023-04-07

## 2023-04-07 PROBLEM — R10.9 ABDOMINAL PAIN: Status: ACTIVE | Noted: 2023-04-07

## 2023-04-12 ENCOUNTER — APPOINTMENT (OUTPATIENT)
Dept: CT IMAGING | Age: 43
End: 2023-04-12
Payer: COMMERCIAL

## 2023-04-12 ENCOUNTER — HOSPITAL ENCOUNTER (EMERGENCY)
Age: 43
Discharge: HOME | End: 2023-04-12
Attending: EMERGENCY MEDICINE
Payer: COMMERCIAL

## 2023-04-12 VITALS
HEART RATE: 72 BPM | OXYGEN SATURATION: 98 % | DIASTOLIC BLOOD PRESSURE: 78 MMHG | BODY MASS INDEX: 33.57 KG/M2 | SYSTOLIC BLOOD PRESSURE: 128 MMHG | WEIGHT: 166.23 LBS | RESPIRATION RATE: 20 BRPM | TEMPERATURE: 98.5 F

## 2023-04-12 DIAGNOSIS — R10.9 ABDOMINAL PAIN, UNSPECIFIED ABDOMINAL LOCATION: Primary | ICD-10-CM

## 2023-04-12 DIAGNOSIS — Z87.19 HISTORY OF DIVERTICULITIS: ICD-10-CM

## 2023-04-12 LAB
ALBUMIN SERPL-MCNC: 3.7 G/DL (ref 3.4–5)
ALP SERPL-CCNC: 144 U/L (ref 40–129)
ALT SERPL-CCNC: 55 U/L (ref 10–40)
ANION GAP SERPL CALCULATED.3IONS-SCNC: 12 MMOL/L (ref 3–16)
AST SERPL-CCNC: 18 U/L (ref 15–37)
BASOPHILS # BLD: 0.1 K/UL (ref 0–0.2)
BASOPHILS NFR BLD: 0.8 %
BILIRUB DIRECT SERPL-MCNC: <0.2 MG/DL (ref 0–0.3)
BILIRUB INDIRECT SERPL-MCNC: ABNORMAL MG/DL (ref 0–1)
BILIRUB SERPL-MCNC: 0.3 MG/DL (ref 0–1)
BUN SERPL-MCNC: 8 MG/DL (ref 7–20)
CALCIUM SERPL-MCNC: 9.1 MG/DL (ref 8.3–10.6)
CHLORIDE SERPL-SCNC: 104 MMOL/L (ref 99–110)
CO2 SERPL-SCNC: 23 MMOL/L (ref 21–32)
CREAT SERPL-MCNC: 0.6 MG/DL (ref 0.6–1.1)
DEPRECATED RDW RBC AUTO: 14.2 % (ref 12.4–15.4)
EOSINOPHIL # BLD: 0 K/UL (ref 0–0.6)
EOSINOPHIL NFR BLD: 0.4 %
GFR SERPLBLD CREATININE-BSD FMLA CKD-EPI: >60 ML/MIN/{1.73_M2}
GLUCOSE SERPL-MCNC: 109 MG/DL (ref 70–99)
HCT VFR BLD AUTO: 43.3 % (ref 36–48)
HGB BLD-MCNC: 14.3 G/DL (ref 12–16)
LIPASE SERPL-CCNC: 14 U/L (ref 13–60)
LYMPHOCYTES # BLD: 2.8 K/UL (ref 1–5.1)
LYMPHOCYTES NFR BLD: 26.1 %
MCH RBC QN AUTO: 29.4 PG (ref 26–34)
MCHC RBC AUTO-ENTMCNC: 33.1 G/DL (ref 31–36)
MCV RBC AUTO: 88.9 FL (ref 80–100)
MONOCYTES # BLD: 0.5 K/UL (ref 0–1.3)
MONOCYTES NFR BLD: 4.2 %
NEUTROPHILS # BLD: 7.3 K/UL (ref 1.7–7.7)
NEUTROPHILS NFR BLD: 68.5 %
PLATELET # BLD AUTO: 106 K/UL (ref 135–450)
PLATELET BLD QL SMEAR: ABNORMAL
PMV BLD AUTO: 8.2 FL (ref 5–10.5)
POTASSIUM SERPL-SCNC: 4.2 MMOL/L (ref 3.5–5.1)
PROT SERPL-MCNC: 6.6 G/DL (ref 6.4–8.2)
RBC # BLD AUTO: 4.87 M/UL (ref 4–5.2)
SLIDE REVIEW: ABNORMAL
SODIUM SERPL-SCNC: 139 MMOL/L (ref 136–145)
WBC # BLD AUTO: 10.6 K/UL (ref 4–11)

## 2023-04-12 PROCEDURE — 6370000000 HC RX 637 (ALT 250 FOR IP): Performed by: EMERGENCY MEDICINE

## 2023-04-12 PROCEDURE — 6360000002 HC RX W HCPCS: Performed by: EMERGENCY MEDICINE

## 2023-04-12 PROCEDURE — 83690 ASSAY OF LIPASE: CPT

## 2023-04-12 PROCEDURE — 2580000003 HC RX 258: Performed by: EMERGENCY MEDICINE

## 2023-04-12 PROCEDURE — 96365 THER/PROPH/DIAG IV INF INIT: CPT

## 2023-04-12 PROCEDURE — 80048 BASIC METABOLIC PNL TOTAL CA: CPT

## 2023-04-12 PROCEDURE — 6360000004 HC RX CONTRAST MEDICATION: Performed by: EMERGENCY MEDICINE

## 2023-04-12 PROCEDURE — 96374 THER/PROPH/DIAG INJ IV PUSH: CPT

## 2023-04-12 PROCEDURE — 36415 COLL VENOUS BLD VENIPUNCTURE: CPT

## 2023-04-12 PROCEDURE — 96375 TX/PRO/DX INJ NEW DRUG ADDON: CPT

## 2023-04-12 PROCEDURE — 93005 ELECTROCARDIOGRAM TRACING: CPT | Performed by: EMERGENCY MEDICINE

## 2023-04-12 PROCEDURE — 74177 CT ABD & PELVIS W/CONTRAST: CPT

## 2023-04-12 PROCEDURE — 80076 HEPATIC FUNCTION PANEL: CPT

## 2023-04-12 PROCEDURE — 99285 EMERGENCY DEPT VISIT HI MDM: CPT

## 2023-04-12 PROCEDURE — 85025 COMPLETE CBC W/AUTO DIFF WBC: CPT

## 2023-04-12 RX ORDER — KETOROLAC TROMETHAMINE 30 MG/ML
15 INJECTION, SOLUTION INTRAMUSCULAR; INTRAVENOUS ONCE
Status: COMPLETED | OUTPATIENT
Start: 2023-04-12 | End: 2023-04-12

## 2023-04-12 RX ORDER — 0.9 % SODIUM CHLORIDE 0.9 %
1000 INTRAVENOUS SOLUTION INTRAVENOUS ONCE
Status: COMPLETED | OUTPATIENT
Start: 2023-04-12 | End: 2023-04-12

## 2023-04-12 RX ORDER — OXYCODONE HYDROCHLORIDE 10 MG/1
10 TABLET ORAL ONCE
Status: COMPLETED | OUTPATIENT
Start: 2023-04-12 | End: 2023-04-12

## 2023-04-12 RX ORDER — ACETAMINOPHEN 325 MG/1
650 TABLET ORAL ONCE
Status: COMPLETED | OUTPATIENT
Start: 2023-04-12 | End: 2023-04-12

## 2023-04-12 RX ADMIN — HYDROMORPHONE HYDROCHLORIDE 0.5 MG: 1 INJECTION, SOLUTION INTRAMUSCULAR; INTRAVENOUS; SUBCUTANEOUS at 19:01

## 2023-04-12 RX ADMIN — KETOROLAC TROMETHAMINE 15 MG: 30 INJECTION, SOLUTION INTRAMUSCULAR at 18:59

## 2023-04-12 RX ADMIN — OXYCODONE HYDROCHLORIDE 10 MG: 10 TABLET ORAL at 21:21

## 2023-04-12 RX ADMIN — SODIUM CHLORIDE 1000 ML: 9 INJECTION, SOLUTION INTRAVENOUS at 20:16

## 2023-04-12 RX ADMIN — Medication 25 MG: at 18:58

## 2023-04-12 RX ADMIN — IOPAMIDOL 75 ML: 755 INJECTION, SOLUTION INTRAVENOUS at 20:32

## 2023-04-12 RX ADMIN — ACETAMINOPHEN 650 MG: 325 TABLET ORAL at 21:21

## 2023-04-12 RX ADMIN — HYDROMORPHONE HYDROCHLORIDE 0.5 MG: 1 INJECTION, SOLUTION INTRAMUSCULAR; INTRAVENOUS; SUBCUTANEOUS at 20:14

## 2023-04-12 ASSESSMENT — PAIN SCALES - GENERAL
PAINLEVEL_OUTOF10: 0
PAINLEVEL_OUTOF10: 3
PAINLEVEL_OUTOF10: 9
PAINLEVEL_OUTOF10: 5
PAINLEVEL_OUTOF10: 9

## 2023-04-12 ASSESSMENT — PAIN - FUNCTIONAL ASSESSMENT: PAIN_FUNCTIONAL_ASSESSMENT: 0-10

## 2023-04-12 NOTE — ED NOTES
Blood work sent, unable to get IV access at this time. Izzy Guzman MD made aware.      Haider Chavez RN  04/12/23 1490

## 2023-04-12 NOTE — ED PROVIDER NOTES
and stomach unremarkable. No small bowel abnormalities are identified. Mild diverticulosis of the large bowel is present without CT evidence of diverticulitis. The appendix is not well visualized. No asymmetric pericecal inflammation is seen to suggest acute appendicitis. Pelvis: Uterus is surgically absent. Urinary bladder unremarkable. No free pelvic fluid. Peritoneum/Retroperitoneum: Shunt is in place, with the tip terminating in the anterior mid pelvis. No free intraperitoneal air. Abdominal aorta normal in caliber. Superior mesenteric artery is enhancing. No lymphadenopathy. Bones/Soft Tissues: No acute or suspicious bony abnormalities are identified. The extra-abdominal and extra pelvic soft tissues are unremarkable. No acute abnormality identified. Mild diverticulosis of the large bowel is present without CT evidence of diverticulitis. MRI ABDOMEN WO CONTRAST MRCP    Result Date: 4/8/2023  EXAMINATION: MRI OF THE ABDOMEN WITHOUT CONTRAST AND MRCP 4/8/2023 5:56 pm TECHNIQUE: Multiplanar multisequence MRI of the abdomen was performed without the administration of intravenous contrast.  After initial T2 axial and coronal images, thick slab, thin slab and 3D coronal MRCP sequences were obtained without the administration of intravenous contrast.  MIP images are provided for review. COMPARISON: CT from yesterday, MRCP on 09/27/2022 HISTORY: Acute abdominal pain and nausea. Elevated LFTs. FINDINGS: Image quality degraded by motion artifact. Gallbladder: Surgically absent. Bile Ducts: The extrahepatic bile duct measures up to 1.3 cm and tapers to the ampulla with no definite intraductal filling defect seen. Pancreatic Duct: Not dilated. No pancreas divisum. Other:  Liver is mildly enlarged with perhaps minimal steatosis. Remaining solid organs are unremarkable. No ascites or significant adenopathy. Visualized gastrointestinal tract and osseous structures are unremarkable.  Abdominal aorta is normal

## 2023-04-13 LAB
EKG ATRIAL RATE: 80 BPM
EKG DIAGNOSIS: NORMAL
EKG P AXIS: 44 DEGREES
EKG P-R INTERVAL: 132 MS
EKG Q-T INTERVAL: 400 MS
EKG QRS DURATION: 84 MS
EKG QTC CALCULATION (BAZETT): 461 MS
EKG R AXIS: 2 DEGREES
EKG T AXIS: 30 DEGREES
EKG VENTRICULAR RATE: 80 BPM

## 2023-04-13 PROCEDURE — 93010 ELECTROCARDIOGRAM REPORT: CPT | Performed by: INTERNAL MEDICINE

## 2023-04-13 NOTE — ED NOTES
Discharge and education instructions reviewed. Patient verbalized understanding, teach-back successful. Patient denied questions at this time. No acute distress noted. Patient instructed to follow-up as noted - return to emergency department if symptoms worsen. Patient verbalized understanding. Discharged per EDMD with discharge instructions.        Jamin Linares RN  04/12/23 3692

## 2023-04-13 NOTE — DISCHARGE INSTRUCTIONS
Your blood tests today were improved from your most recent visit. Your CT scan had no acute abnormalities. We talked about making sure you are staying hydrated and following up with your gastroenterologist.  Also in regards to the pain medicine talking to your primary care/pain management doctor. Return to emergency department for any new or worsening symptoms or concerns.

## 2023-04-16 ENCOUNTER — HOSPITAL ENCOUNTER (EMERGENCY)
Age: 43
Discharge: HOME | End: 2023-04-16
Attending: EMERGENCY MEDICINE
Payer: COMMERCIAL

## 2023-04-16 ENCOUNTER — APPOINTMENT (OUTPATIENT)
Dept: CT IMAGING | Age: 43
End: 2023-04-16
Payer: COMMERCIAL

## 2023-04-16 VITALS
RESPIRATION RATE: 24 BRPM | OXYGEN SATURATION: 99 % | HEART RATE: 77 BPM | WEIGHT: 160.05 LBS | HEIGHT: 59 IN | DIASTOLIC BLOOD PRESSURE: 89 MMHG | SYSTOLIC BLOOD PRESSURE: 126 MMHG | BODY MASS INDEX: 32.27 KG/M2 | TEMPERATURE: 97.8 F

## 2023-04-16 DIAGNOSIS — R10.30 LOWER ABDOMINAL PAIN: Primary | ICD-10-CM

## 2023-04-16 LAB
ALBUMIN SERPL-MCNC: 4.2 G/DL (ref 3.4–5)
ALBUMIN/GLOB SERPL: 1.3 {RATIO} (ref 1.1–2.2)
ALP SERPL-CCNC: 129 U/L (ref 40–129)
ALT SERPL-CCNC: 22 U/L (ref 10–40)
ANION GAP SERPL CALCULATED.3IONS-SCNC: 14 MMOL/L (ref 3–16)
AST SERPL-CCNC: 11 U/L (ref 15–37)
BASOPHILS # BLD: 0.1 K/UL (ref 0–0.2)
BASOPHILS NFR BLD: 1.1 %
BILIRUB SERPL-MCNC: 0.3 MG/DL (ref 0–1)
BILIRUB UR QL STRIP.AUTO: NEGATIVE
BUN SERPL-MCNC: 10 MG/DL (ref 7–20)
CALCIUM SERPL-MCNC: 9.5 MG/DL (ref 8.3–10.6)
CHLORIDE SERPL-SCNC: 105 MMOL/L (ref 99–110)
CLARITY UR: CLEAR
CO2 SERPL-SCNC: 23 MMOL/L (ref 21–32)
COLOR UR: YELLOW
CREAT SERPL-MCNC: 0.6 MG/DL (ref 0.6–1.1)
DEPRECATED RDW RBC AUTO: 14.3 % (ref 12.4–15.4)
EOSINOPHIL # BLD: 0.1 K/UL (ref 0–0.6)
EOSINOPHIL NFR BLD: 1.1 %
GFR SERPLBLD CREATININE-BSD FMLA CKD-EPI: >60 ML/MIN/{1.73_M2}
GLUCOSE SERPL-MCNC: 140 MG/DL (ref 70–99)
GLUCOSE UR STRIP.AUTO-MCNC: NEGATIVE MG/DL
HCT VFR BLD AUTO: 43.3 % (ref 36–48)
HGB BLD-MCNC: 14.3 G/DL (ref 12–16)
HGB UR QL STRIP.AUTO: NEGATIVE
KETONES UR STRIP.AUTO-MCNC: NEGATIVE MG/DL
LEUKOCYTE ESTERASE UR QL STRIP.AUTO: NEGATIVE
LYMPHOCYTES # BLD: 1.5 K/UL (ref 1–5.1)
LYMPHOCYTES NFR BLD: 11.9 %
MCH RBC QN AUTO: 28.7 PG (ref 26–34)
MCHC RBC AUTO-ENTMCNC: 33.2 G/DL (ref 31–36)
MCV RBC AUTO: 86.7 FL (ref 80–100)
MONOCYTES # BLD: 0.4 K/UL (ref 0–1.3)
MONOCYTES NFR BLD: 3.4 %
NEUTROPHILS # BLD: 10.1 K/UL (ref 1.7–7.7)
NEUTROPHILS NFR BLD: 82.5 %
NITRITE UR QL STRIP.AUTO: NEGATIVE
PH UR STRIP.AUTO: 6.5 [PH] (ref 5–8)
PLATELET # BLD AUTO: 309 K/UL (ref 135–450)
PMV BLD AUTO: 7.7 FL (ref 5–10.5)
POTASSIUM SERPL-SCNC: 4.3 MMOL/L (ref 3.5–5.1)
PROT SERPL-MCNC: 7.5 G/DL (ref 6.4–8.2)
PROT UR STRIP.AUTO-MCNC: NEGATIVE MG/DL
RBC # BLD AUTO: 4.99 M/UL (ref 4–5.2)
SODIUM SERPL-SCNC: 142 MMOL/L (ref 136–145)
SP GR UR STRIP.AUTO: 1.01 (ref 1–1.03)
UA COMPLETE W REFLEX CULTURE PNL UR: NORMAL
UA DIPSTICK W REFLEX MICRO PNL UR: NORMAL
URN SPEC COLLECT METH UR: NORMAL
UROBILINOGEN UR STRIP-ACNC: 0.2 E.U./DL
WBC # BLD AUTO: 12.2 K/UL (ref 4–11)

## 2023-04-16 PROCEDURE — 99285 EMERGENCY DEPT VISIT HI MDM: CPT

## 2023-04-16 PROCEDURE — 74177 CT ABD & PELVIS W/CONTRAST: CPT

## 2023-04-16 PROCEDURE — 6360000002 HC RX W HCPCS: Performed by: PHYSICIAN ASSISTANT

## 2023-04-16 PROCEDURE — 6370000000 HC RX 637 (ALT 250 FOR IP): Performed by: PHYSICIAN ASSISTANT

## 2023-04-16 PROCEDURE — 36415 COLL VENOUS BLD VENIPUNCTURE: CPT

## 2023-04-16 PROCEDURE — 96376 TX/PRO/DX INJ SAME DRUG ADON: CPT

## 2023-04-16 PROCEDURE — 85025 COMPLETE CBC W/AUTO DIFF WBC: CPT

## 2023-04-16 PROCEDURE — 80053 COMPREHEN METABOLIC PANEL: CPT

## 2023-04-16 PROCEDURE — 6360000002 HC RX W HCPCS: Performed by: EMERGENCY MEDICINE

## 2023-04-16 PROCEDURE — 96375 TX/PRO/DX INJ NEW DRUG ADDON: CPT

## 2023-04-16 PROCEDURE — 81003 URINALYSIS AUTO W/O SCOPE: CPT

## 2023-04-16 PROCEDURE — 6360000004 HC RX CONTRAST MEDICATION: Performed by: EMERGENCY MEDICINE

## 2023-04-16 PROCEDURE — 96365 THER/PROPH/DIAG IV INF INIT: CPT

## 2023-04-16 RX ORDER — 0.9 % SODIUM CHLORIDE 0.9 %
250 INTRAVENOUS SOLUTION INTRAVENOUS ONCE
Status: DISCONTINUED | OUTPATIENT
Start: 2023-04-16 | End: 2023-04-16

## 2023-04-16 RX ORDER — OXYCODONE HYDROCHLORIDE AND ACETAMINOPHEN 5; 325 MG/1; MG/1
1 TABLET ORAL ONCE
Status: COMPLETED | OUTPATIENT
Start: 2023-04-16 | End: 2023-04-16

## 2023-04-16 RX ADMIN — OXYCODONE AND ACETAMINOPHEN 1 TABLET: 5; 325 TABLET ORAL at 16:04

## 2023-04-16 RX ADMIN — IOPAMIDOL 75 ML: 755 INJECTION, SOLUTION INTRAVENOUS at 13:46

## 2023-04-16 RX ADMIN — Medication 6.25 MG: at 14:59

## 2023-04-16 RX ADMIN — HYDROMORPHONE HYDROCHLORIDE 1 MG: 1 INJECTION, SOLUTION INTRAMUSCULAR; INTRAVENOUS; SUBCUTANEOUS at 13:03

## 2023-04-16 RX ADMIN — HYDROMORPHONE HYDROCHLORIDE 0.5 MG: 1 INJECTION, SOLUTION INTRAMUSCULAR; INTRAVENOUS; SUBCUTANEOUS at 14:58

## 2023-04-16 ASSESSMENT — PAIN SCALES - GENERAL
PAINLEVEL_OUTOF10: 4
PAINLEVEL_OUTOF10: 8
PAINLEVEL_OUTOF10: 8

## 2023-04-16 ASSESSMENT — ENCOUNTER SYMPTOMS
NAUSEA: 0
ABDOMINAL PAIN: 1
SHORTNESS OF BREATH: 0
COUGH: 0
VOMITING: 0

## 2023-04-16 ASSESSMENT — PAIN DESCRIPTION - LOCATION
LOCATION: ABDOMEN
LOCATION: ABDOMEN

## 2023-04-21 ENCOUNTER — OFFICE VISIT (OUTPATIENT)
Dept: SURGERY | Age: 43
End: 2023-04-21
Payer: COMMERCIAL

## 2023-04-21 VITALS
SYSTOLIC BLOOD PRESSURE: 148 MMHG | HEIGHT: 59 IN | BODY MASS INDEX: 32.33 KG/M2 | DIASTOLIC BLOOD PRESSURE: 118 MMHG | OXYGEN SATURATION: 97 % | HEART RATE: 108 BPM

## 2023-04-21 DIAGNOSIS — K62.5 RECTAL BLEEDING: Primary | ICD-10-CM

## 2023-04-21 DIAGNOSIS — K64.2 GRADE III HEMORRHOIDS: ICD-10-CM

## 2023-04-21 PROCEDURE — 99204 OFFICE O/P NEW MOD 45 MIN: CPT | Performed by: SURGERY

## 2023-04-21 PROCEDURE — G8427 DOCREV CUR MEDS BY ELIG CLIN: HCPCS | Performed by: SURGERY

## 2023-04-21 PROCEDURE — 3080F DIAST BP >= 90 MM HG: CPT | Performed by: SURGERY

## 2023-04-21 PROCEDURE — G8417 CALC BMI ABV UP PARAM F/U: HCPCS | Performed by: SURGERY

## 2023-04-21 PROCEDURE — 3077F SYST BP >= 140 MM HG: CPT | Performed by: SURGERY

## 2023-04-21 PROCEDURE — 4004F PT TOBACCO SCREEN RCVD TLK: CPT | Performed by: SURGERY

## 2023-04-21 PROCEDURE — 1111F DSCHRG MED/CURRENT MED MERGE: CPT | Performed by: SURGERY

## 2023-04-21 NOTE — PATIENT INSTRUCTIONS
experience sharp or severe pain that doesn't subside within 1-2 hours  You have excessive bleeding, fever, chills, or inability to urinate  You need to reschedule or have changed your mind about having the procedure. Dr Yeny De La Paz phone # is [phone]  If you are unable to reach the office (outside of normal business hours) and you have any concerns, go to your nearest emergency room.

## 2023-04-21 NOTE — PROGRESS NOTES
reviewed and negative. Objective:     Physical Exam   BP (!) 148/118   Pulse (!) 108   Ht 4' 11\" (1.499 m)   LMP 10/31/2016 (Exact Date)   SpO2 97%   BMI 32.33 kg/m²   Constitutional: Appears well-developed and well-nourished. Grooming appropriate. No gross deformities. Body mass index is 32.33 kg/m². Eyes: No scleral icterus. Conjunctiva/lids normal. Vision intact grossly. Pupils equal/symmetric, reactive bilaterally. ENT: External ears/nose without defect, scars, or masses. Hearing grossly intact. No facial deformity. Lips normal, normal dentition. Neck: No masses. Trachea midline. No crepitus. Thyroid not enlarged. Cardiovascular: Normal rate. No peripheral edema. Abdominal aorta normal size to palpation. Pulmonary/Chest: Effort normal. No respiratory distress. No wheezes. No use of accessory muscles. Musculoskeletal: Normal range of motion x all 4 extremities and head/neck, without deformity, pain, or crepitus, with normal strength and tone. Normal gait. Nails without clubbing or cyanosis. Neurological: Alert and oriented to person, place, and time. No gross deficits. Sensation intact. Skin: Skin is dry. No rashes noted. No pallor. No induration of nodules. Psychiatric: Normal mood and affect. Behavior normal. Oriented to person, place, and time. Judgment and insight reasonable. Abdominal/wound: soft, nontender    Anorectal exam:    ANOSCOPY:    Chaperone/MA present in room during entire exam. Patient was placed in left lateral down or knee-chest positioning depending on patient comfort. Exam table manipulated for proper visualization and lighting. Buttocks spread. Inspection reveals: no perianal skin lesions, excoriation. External hemorrhoids/tags: yes . Digital exam performed with lubricated index finger revealing: no masses, induration, or tenderness. No gross blood. Normal tone.      Lubricated anoscope inserted gently into anus and withdrawn with light attachment for

## 2023-04-23 ENCOUNTER — APPOINTMENT (OUTPATIENT)
Dept: CT IMAGING | Age: 43
End: 2023-04-23
Payer: COMMERCIAL

## 2023-04-23 ENCOUNTER — HOSPITAL ENCOUNTER (EMERGENCY)
Age: 43
Discharge: HOME | End: 2023-04-23
Attending: EMERGENCY MEDICINE
Payer: COMMERCIAL

## 2023-04-23 VITALS
HEIGHT: 59 IN | OXYGEN SATURATION: 97 % | TEMPERATURE: 98.4 F | BODY MASS INDEX: 32.44 KG/M2 | SYSTOLIC BLOOD PRESSURE: 110 MMHG | DIASTOLIC BLOOD PRESSURE: 60 MMHG | RESPIRATION RATE: 16 BRPM | HEART RATE: 84 BPM | WEIGHT: 160.94 LBS

## 2023-04-23 DIAGNOSIS — R10.30 LOWER ABDOMINAL PAIN: Primary | ICD-10-CM

## 2023-04-23 PROCEDURE — 74176 CT ABD & PELVIS W/O CONTRAST: CPT

## 2023-04-23 PROCEDURE — 6370000000 HC RX 637 (ALT 250 FOR IP): Performed by: EMERGENCY MEDICINE

## 2023-04-23 PROCEDURE — 99284 EMERGENCY DEPT VISIT MOD MDM: CPT

## 2023-04-23 RX ORDER — PROMETHAZINE HYDROCHLORIDE 25 MG/1
25 TABLET ORAL ONCE
Status: COMPLETED | OUTPATIENT
Start: 2023-04-23 | End: 2023-04-23

## 2023-04-23 RX ORDER — PROMETHAZINE HYDROCHLORIDE 25 MG/1
25 TABLET ORAL EVERY 6 HOURS PRN
Qty: 20 TABLET | Refills: 0 | Status: SHIPPED | OUTPATIENT
Start: 2023-04-23 | End: 2023-04-30

## 2023-04-23 RX ORDER — PROMETHAZINE HYDROCHLORIDE 25 MG/1
25 SUPPOSITORY RECTAL EVERY 6 HOURS PRN
Qty: 7 SUPPOSITORY | Refills: 0 | Status: SHIPPED | OUTPATIENT
Start: 2023-04-23 | End: 2023-04-30

## 2023-04-23 RX ADMIN — PROMETHAZINE HYDROCHLORIDE 25 MG: 25 TABLET ORAL at 03:51

## 2023-04-23 ASSESSMENT — PAIN SCALES - GENERAL
PAINLEVEL_OUTOF10: 8
PAINLEVEL_OUTOF10: 8

## 2023-04-23 ASSESSMENT — PAIN - FUNCTIONAL ASSESSMENT
PAIN_FUNCTIONAL_ASSESSMENT: 0-10
PAIN_FUNCTIONAL_ASSESSMENT: 0-10

## 2023-04-23 ASSESSMENT — PAIN DESCRIPTION - DESCRIPTORS: DESCRIPTORS: SHARP

## 2023-04-23 ASSESSMENT — PAIN DESCRIPTION - LOCATION: LOCATION: ABDOMEN

## 2023-04-24 ENCOUNTER — TELEPHONE (OUTPATIENT)
Dept: PRIMARY CARE CLINIC | Age: 43
End: 2023-04-24

## 2023-04-24 NOTE — TELEPHONE ENCOUNTER
LATE ENTRY:    Patient called on call provider at [place] [address] on 4/22/2023 stating that she had lower abdominal pain, stating that she had a previous CT scan that stated she had \"fluid in her belly\" wondering if it is related to her shunt. Stated that when she stood up it felt like she was going to pass out. When I spoke to the patient, she reiterated this information, and told me that when she goes to the ER she feels like no one listens to her and she doesn't want to be seen as a frequent flyer because she is not there for narcotics, as she \"has them at home\". Instructed patient after a lengthy conversation that there was nothing I could do over the phone, and that if her symptoms persisted to seek emergency medical care. Patient decided to call the squad and proceeded to [facility] ER.

## 2023-04-24 NOTE — TELEPHONE ENCOUNTER
LATE ENTRY:    Patient called on call provider again at  on 4/23/2023. States she went to [facility] and she left because they said everything looked okay. States they only gave her IV fluids and \"refused to give\" nausea medications. She wanted to know if she should go to another ER, but doesn't know if she wants to because the other hospital treated her like crap. Stated she \"doesn't know what to do\" and \"feels like giving up, I'm done\". Returned patient call, she stated that the paramedic told her she was lethargic and that she didn't look good. She told me that her temperature was 96 degrees and she is freezing and that she doesn't understand why the ERs aren't helping her. She feels like giving up she just wants to feel better. Patient was on the phone with this provider for an extended period. I again informed her that there was nothing I could do over the phone, but that if she felt like she isn't any better and her symptoms are worsening, then she should seek treatment at another facility. She tells me that she doesn't want anything for pain, she just wants it to be figured out. Patient told me she was going to call the squad and go to [id number] [facility] ER.

## 2023-04-25 ENCOUNTER — TELEPHONE (OUTPATIENT)
Dept: SURGERY | Age: 43
End: 2023-04-25

## 2023-04-25 DIAGNOSIS — K64.2 GRADE III HEMORRHOIDS: Primary | ICD-10-CM

## 2023-04-25 DIAGNOSIS — K62.5 RECTAL BLEEDING: ICD-10-CM

## 2023-04-25 RX ORDER — DIAZEPAM 5 MG/1
5 TABLET ORAL EVERY 8 HOURS PRN
Qty: 1 TABLET | Refills: 0 | Status: SHIPPED | OUTPATIENT
Start: 2023-04-25 | End: 2023-04-26

## 2023-04-25 NOTE — TELEPHONE ENCOUNTER
Pt returned missed call. I relayed the message about needing a ride if we were to prescribe Valium, and she expressed understanding. She said that she did not drive, so either way she will have a ride to and from the appointment. Please [address] the Valium [facility] [address]     If needed, the best call back number is [phone]

## 2023-04-25 NOTE — TELEPHONE ENCOUNTER
Patient called to see if Dr. Koroma Mention would be able to prescribe her something to keep her calm for the RBL this Thursday, 4/27 - she's very nervous. The Nurse Practitioner that's on file as her PCP, Yumiko Hackett, resigned, and the patient said there is no one else in the practice that's able to prescribe her anything. Please call back at [phone] to let her know if something can be called in.

## 2023-04-25 NOTE — ED PROVIDER NOTES
[address] [facility]        Pt Name: Froilan Marvin  MRN: [id number]  [place] 1980  Date of evaluation: 4/23/2023  Provider: Lora Tanner MD  PCP: FREDRICK Monterroso - CNP  Note Started: 4:14 AM EDT 4/25/23    CHIEF COMPLAINT       Chief Complaint   Patient presents with    Abdominal Pain     Pt was here earlier in the week. Still having abdominal pain. Called ambulance and they took her to [facility]. States they did nothing for her, stuck her 15 times including an ej. Gave her fluids but nothing else. HISTORY OF PRESENT ILLNESS: 1 or more Elements   History From: Patient        Froilan Marvin is a 38 y.o. female who presents for evaluation of lower abdominal pain. Patient reports history of chronic lower abdominal pain which has been persistent since onset. Patient has been seen multiple times at multiple facilities with similar complaints. She had an admission for lower abdominal pain over this month. She has had multiple CT scans and MRI and colonoscopy. Patient recently seen by colorectal surgery for inflamed hemorrhoids. Patient reports having persistent abdominal pain this evening with associated nausea. States he does not have any medications to treat her symptoms. Denies changes in bowel or urine function hematochezia or melena. Patient is a high utilizer of the emergency department. Reviewing patient's medical record she was at another emergency department shortly before this encounter. Per documentation patient became upset when she was not given IV Phenergan. I did review laboratory work-up from this encounter. Nursing Notes were all reviewed and agreed with or any disagreements were addressed in the HPI. REVIEW OF SYSTEMS :      Review of Systems    Positives and Pertinent negatives as per HPI.      SURGICAL HISTORY     Past Surgical History:   Procedure Laterality Date    ABDOMEN SURGERY N/A

## 2023-04-27 ENCOUNTER — HOSPITAL ENCOUNTER (EMERGENCY)
Age: 43
Discharge: HOME | End: 2023-04-27
Attending: EMERGENCY MEDICINE
Payer: COMMERCIAL

## 2023-04-27 ENCOUNTER — TELEPHONE (OUTPATIENT)
Dept: SURGERY | Age: 43
End: 2023-04-27

## 2023-04-27 ENCOUNTER — OFFICE VISIT (OUTPATIENT)
Dept: SURGERY | Age: 43
End: 2023-04-27

## 2023-04-27 VITALS
WEIGHT: 157 LBS | TEMPERATURE: 98.1 F | OXYGEN SATURATION: 100 % | DIASTOLIC BLOOD PRESSURE: 85 MMHG | HEART RATE: 87 BPM | BODY MASS INDEX: 31.65 KG/M2 | SYSTOLIC BLOOD PRESSURE: 129 MMHG | HEIGHT: 59 IN

## 2023-04-27 VITALS
TEMPERATURE: 98.9 F | OXYGEN SATURATION: 99 % | SYSTOLIC BLOOD PRESSURE: 128 MMHG | DIASTOLIC BLOOD PRESSURE: 90 MMHG | HEART RATE: 88 BPM | RESPIRATION RATE: 18 BRPM

## 2023-04-27 DIAGNOSIS — G89.18 OTHER ACUTE POSTPROCEDURAL PAIN: Primary | ICD-10-CM

## 2023-04-27 DIAGNOSIS — K62.5 RECTAL BLEEDING: ICD-10-CM

## 2023-04-27 DIAGNOSIS — K64.2 GRADE III HEMORRHOIDS: Primary | ICD-10-CM

## 2023-04-27 PROCEDURE — 99283 EMERGENCY DEPT VISIT LOW MDM: CPT

## 2023-04-27 PROCEDURE — 6370000000 HC RX 637 (ALT 250 FOR IP): Performed by: EMERGENCY MEDICINE

## 2023-04-27 RX ORDER — DIAZEPAM 5 MG/1
5 TABLET ORAL ONCE
Status: COMPLETED | OUTPATIENT
Start: 2023-04-27 | End: 2023-04-27

## 2023-04-27 RX ORDER — OXYCODONE HYDROCHLORIDE 5 MG/1
5 TABLET ORAL ONCE
Status: COMPLETED | OUTPATIENT
Start: 2023-04-27 | End: 2023-04-27

## 2023-04-27 RX ORDER — DIAZEPAM 5 MG/1
5 TABLET ORAL EVERY 8 HOURS PRN
Qty: 10 TABLET | Refills: 0 | Status: SHIPPED | OUTPATIENT
Start: 2023-04-27 | End: 2023-04-29

## 2023-04-27 RX ORDER — OXYCODONE HYDROCHLORIDE 5 MG/1
5 TABLET ORAL EVERY 6 HOURS PRN
Qty: 12 TABLET | Refills: 0 | Status: SHIPPED | OUTPATIENT
Start: 2023-04-27 | End: 2023-04-28

## 2023-04-27 RX ORDER — ONDANSETRON 4 MG/1
8 TABLET, ORALLY DISINTEGRATING ORAL ONCE
Status: COMPLETED | OUTPATIENT
Start: 2023-04-27 | End: 2023-04-27

## 2023-04-27 RX ADMIN — OXYCODONE HYDROCHLORIDE 5 MG: 5 TABLET ORAL at 20:14

## 2023-04-27 RX ADMIN — DIAZEPAM 5 MG: 5 TABLET ORAL at 20:14

## 2023-04-27 RX ADMIN — ONDANSETRON 8 MG: 4 TABLET, ORALLY DISINTEGRATING ORAL at 20:14

## 2023-04-27 ASSESSMENT — PAIN DESCRIPTION - LOCATION
LOCATION: RECTUM
LOCATION: RECTUM

## 2023-04-27 ASSESSMENT — PAIN SCALES - GENERAL
PAINLEVEL_OUTOF10: 9
PAINLEVEL_OUTOF10: 8

## 2023-04-27 ASSESSMENT — PAIN - FUNCTIONAL ASSESSMENT: PAIN_FUNCTIONAL_ASSESSMENT: 0-10

## 2023-04-27 NOTE — TELEPHONE ENCOUNTER
Patient called because she is still waiting on the Valium to be signed and submitted by Dr. Elizabeth Dubois. She says her pain is 8/10 and she fears it's only going to get worse. She was advised to take Tylenol following the instructions on the bottle, or go to [facility]. ER if it becomes unbearable. Her only issue with that is she said she lives so far that an ambulance would not take her to [facility], and she does not feel like she'd be able to sit, let alone drive. Please call back at [phone] when the Valium is ready.

## 2023-04-27 NOTE — ED PROVIDER NOTES
smokeless tobacco. She reports that she does not drink alcohol and does not use drugs. Medications     Discharge Medication List as of 4/27/2023 10:45 PM        CONTINUE these medications which have NOT CHANGED    Details   promethazine (PROMETHEGAN) 25 MG suppository Place 1 suppository rectally every 6 hours as needed for Nausea (or vomiting), Disp-7 suppository, R-0Normal      promethazine (PHENERGAN) 25 MG tablet Take 1 tablet by mouth every 6 hours as needed for Nausea WARNING:  May cause drowsiness. May impair ability to operate vehicles or machinery. Do not use in combination with alcohol., Disp-20 tablet, R-0Normal      polyethylene glycol (GLYCOLAX) 17 g packet Take 17 g by mouth 2 times daily, Disp-60 each, R-0Normal      pantoprazole (PROTONIX) 40 MG tablet Take 1 tablet by mouth every morning (before breakfast), Disp-30 tablet, R-0Normal      hyoscyamine (LEVSIN/SL) 125 MCG sublingual tablet Place 1 tablet under the tongue 3 times daily, Disp-90 tablet, R-0Normal      atorvastatin (LIPITOR) 10 MG tablet Take 1 tablet by mouth at bedtimeHistorical Med      lamoTRIgine (LAMICTAL) 25 MG tablet Take 1 tablet by mouth 2 times dailyHistorical Med      VYVANSE 30 MG capsule Take 1 capsule by mouth daily. , DAWHistorical Med      diphenhydrAMINE (BENADRYL) 25 MG tablet Take 2 tablets by mouth at bedtimeHistorical Med      gabapentin (NEURONTIN) 400 MG capsule Take 1 capsule by mouth 4 times daily. Historical Med             Allergies     She is allergic to bee venom, bentyl [dicyclomine hcl], dicyclomine, ketorolac tromethamine, levofloxacin, maitake, shiitake mushroom, sulfa antibiotics, vancomycin, zosyn [piperacillin sod-tazobactam so], adhesive tape, sulfacetamide, zofran [ondansetron], acetaminophen, butalbital-apap-caff-cod, ceftaroline, daptomycin, haldol [haloperidol], keppra [levetiracetam], ketamine, methocarbamol, morphine, nitrofurantoin, prochlorperazine, reglan [metoclopramide], silicone, and

## 2023-04-27 NOTE — TELEPHONE ENCOUNTER
Patient called stating she is in a lot of pain after having banding done this morning. She is unable to sit. She has taken 3 tylenol with no relief. She would like to know if she should be in this much discomfort.     Please call: [phone]

## 2023-04-27 NOTE — CONSULTS
General Surgery   Resident Consult Note    Reason for Consult: Pain after in office internal hemorrhoid ligation     History of Present Illness:   Dimitri Vines is a 38 y.o. female with Hx as below, including hx internal hemorrhoids who presented to ED after seeing Dr. Danny Bills earlier today in office for suction band ligation of three internal hemorrhoids located 1 cm proximal to the dentate line. She reports feeling fine immediately following the procedure, but that she developed progressively more severe rectal pain throughout the day. In the ED, she is endorsing rectal, pelvic, and lower abdominal pain that is crampy in character. She is voiding appropriately and has had a bowel movement since her procedure, which had a small amount of clotted blood, but was otherwise normal.  She is endorsing nausea, no emesis. Past Medical History:        Diagnosis Date    ADHD (attention deficit hyperactivity disorder) 1988    Depression with anxiety     Diabetes mellitus ([facility] [address]     pre-diabetes    Difficult intubation     airway swelled up    Encounter for imaging to screen for metal prior to MRI 06/01/2021    MRI Conditional Medtronic Non-Programmable shunt model#[id number] implanted 10/30/2020 at [facility]. Normal Mode. 1.5T or 3.0T. ESBL (extended spectrum beta-lactamase) producing bacteria infection 11/06/2019    urine    Functional ovarian cysts 2008    rt ovary cyst x 2 yrs.     Headache(784.0)     migraines    History of blood transfusion     at birth    History of kidney stones     History of PCOS     had hysterectomy in 2016    Hydrocephalus [facility])     Hyperlipidemia     Irritable bowel syndrome 2004    had colonoscopy about 6 yrs ago    Meningitis 96/4853    Neutrophilic leukocytosis     Nicotine dependence     PONV (postoperative nausea and vomiting)     very nauseated and sometimes wakes up with a Migraine--happened once after brain surgery    Primary osteoarthritis of left knee 07/01/2016    S/P cone

## 2023-04-27 NOTE — PATIENT INSTRUCTIONS
our office for free or at most drug stores over-the-counter. At the doctor's office  Bring a picture ID, insurance information, and any required copay. Please arrive 10 minutes early. The procedure is performed without sedation, as most patients have only minor discomfort, if any. Some patients may experience lightheadedness right after the procedure and need to sit down for 5-10 minutes before leaving the office  Some patients will feel more comfortable having someone else drive them to the appointment, though this is not required    After the procedure: There are no specific wound care instructions other than keeping stools soft as mentioned above  Warm water soaks (5-10 minutes) can be helpful for any discomfort  You may feel a \"fullness\" in your rectum and the urge to defecate during the first 24 hours. This is normal.  You should expect the hemorrhoid tissue to fall off and pass within 2-10 days. This may be accompanied by passing tissue or a small amount of blood. This is normal.  You will have an office appointment scheduled in 3-4 weeks to assess the need for additional rubber banding or other treatments. Risks and potential complications  Potential need for additional rubber banding in office (common)  Potential need for future hemorrhoid surgery (uncommon)  Clotting and pain from external hemorrhoids (uncommon)  Rectal bleeding requiring surgery (uncommon)  Difficulty with urinating (uncommon)  Damage to sphincter muscle resulting in changes in your ability to control stool or gas (rare)  Infections requiring emergency surgery and colostomy (very rare)    When should you call the office? You have any questions or concerns. You don't understand how to prepare for your procedure.   You experience sharp or severe pain that doesn't subside within 1-2 hours  You have excessive bleeding, fever, chills, or inability to urinate  You need to reschedule or have changed your mind about having the

## 2023-04-27 NOTE — PROGRESS NOTES
INTERNAL HEMORRHOID RUBBER BAND LIGATION PROCEDURE NOTE:    Patient presented with symptomatic internal hemorrhoids unresponsive to conservative management. See previous office notes for details of previous history and treatments. Risks of rubber band ligation explained to patient, including but not limited to: immediate and delayed bleeding, pain, infection, external hemorrhoids thrombosis, pelvic sepsis, urinary retention, sphincter dysfunction, need for additional procedures, and recurrence. Patient was previously provided with information in office AVS (after visit summary) and given opportunity to ask any questions and bring up any concerns. Chaperone/MA present in room during entire procedure. Patient was placed in either lateral or knee-chest positioning depending on patient preference. Exam table manipulated for proper visualization and lighting. Buttocks spread. Digital exam and anoscopy performed confirming internal hemorrhoids. Suction rubber band ligator used to place band in 3 positions, 1 cm proximal to the dentate line, at the apex of the internal hemorrhoid. Anoscope removed. Patient tolerated the procedure well without complication. EBL minimal. Post procedure expectations and instructions explained to patient. Written instructions provided as well. Disposition: Follow up in 4 weeks for symptom reassessment.     Electronically signed by Adri Pierre MD on 4/27/2023 at 8:42 AM

## 2023-04-28 RX ORDER — DIAZEPAM 2 MG/1
2 TABLET ORAL EVERY 8 HOURS PRN
Qty: 10 TABLET | Refills: 0 | Status: SHIPPED | OUTPATIENT
Start: 2023-04-28 | End: 2023-05-08

## 2023-04-28 NOTE — ED NOTES
--Patient provided with discharge instructions and any prescriptions. --Instructions, dosing, and follow-up appointments reviewed with patient/family. No further questions or needs at this time. --Vital signs and patient stable upon discharge. --Patient ambulatory to [facility].         Ken Jeffries RN  04/27/23 2486

## 2023-04-28 NOTE — DISCHARGE INSTRUCTIONS
Follow-up with Dr. Jerzy Carrillo as scheduled. Return to ED for worsening symptoms or other concerns. Use great caution taking these medications and take only what is prescribed. Do not drive while taking them.

## 2023-05-02 ENCOUNTER — HOSPITAL ENCOUNTER (EMERGENCY)
Age: 43
Discharge: HOME | End: 2023-05-02
Attending: EMERGENCY MEDICINE
Payer: COMMERCIAL

## 2023-05-02 ENCOUNTER — APPOINTMENT (OUTPATIENT)
Dept: CT IMAGING | Age: 43
End: 2023-05-02
Payer: COMMERCIAL

## 2023-05-02 ENCOUNTER — APPOINTMENT (OUTPATIENT)
Dept: GENERAL RADIOLOGY | Age: 43
End: 2023-05-02
Payer: COMMERCIAL

## 2023-05-02 VITALS
TEMPERATURE: 98.4 F | WEIGHT: 161.6 LBS | BODY MASS INDEX: 32.58 KG/M2 | HEIGHT: 59 IN | RESPIRATION RATE: 20 BRPM | DIASTOLIC BLOOD PRESSURE: 83 MMHG | OXYGEN SATURATION: 92 % | SYSTOLIC BLOOD PRESSURE: 113 MMHG | HEART RATE: 81 BPM

## 2023-05-02 DIAGNOSIS — S09.90XA CLOSED HEAD INJURY, INITIAL ENCOUNTER: Primary | ICD-10-CM

## 2023-05-02 LAB
EKG ATRIAL RATE: 91 BPM
EKG DIAGNOSIS: NORMAL
EKG P AXIS: 43 DEGREES
EKG P-R INTERVAL: 134 MS
EKG Q-T INTERVAL: 386 MS
EKG QRS DURATION: 82 MS
EKG QTC CALCULATION (BAZETT): 474 MS
EKG R AXIS: -9 DEGREES
EKG T AXIS: 30 DEGREES
EKG VENTRICULAR RATE: 91 BPM

## 2023-05-02 PROCEDURE — 93005 ELECTROCARDIOGRAM TRACING: CPT | Performed by: EMERGENCY MEDICINE

## 2023-05-02 PROCEDURE — 70450 CT HEAD/BRAIN W/O DYE: CPT

## 2023-05-02 PROCEDURE — 93010 ELECTROCARDIOGRAM REPORT: CPT | Performed by: INTERNAL MEDICINE

## 2023-05-02 PROCEDURE — 99284 EMERGENCY DEPT VISIT MOD MDM: CPT

## 2023-05-02 PROCEDURE — 6370000000 HC RX 637 (ALT 250 FOR IP)

## 2023-05-02 RX ORDER — SODIUM CHLORIDE, SODIUM LACTATE, POTASSIUM CHLORIDE, AND CALCIUM CHLORIDE .6; .31; .03; .02 G/100ML; G/100ML; G/100ML; G/100ML
1000 INJECTION, SOLUTION INTRAVENOUS ONCE
Status: DISCONTINUED | OUTPATIENT
Start: 2023-05-02 | End: 2023-05-02

## 2023-05-02 RX ORDER — ACETAMINOPHEN 325 MG/1
650 TABLET ORAL ONCE
Status: COMPLETED | OUTPATIENT
Start: 2023-05-02 | End: 2023-05-02

## 2023-05-02 RX ADMIN — ACETAMINOPHEN 650 MG: 325 TABLET ORAL at 00:56

## 2023-05-02 ASSESSMENT — ENCOUNTER SYMPTOMS
COUGH: 0
CONSTIPATION: 0
BACK PAIN: 0
RHINORRHEA: 0
EYE PAIN: 0
DIARRHEA: 0
VOMITING: 0
NAUSEA: 0
SHORTNESS OF BREATH: 0
ABDOMINAL PAIN: 0
SORE THROAT: 0

## 2023-05-02 ASSESSMENT — PAIN SCALES - GENERAL
PAINLEVEL_OUTOF10: 8

## 2023-05-02 ASSESSMENT — PAIN - FUNCTIONAL ASSESSMENT: PAIN_FUNCTIONAL_ASSESSMENT: 0-10

## 2023-05-02 ASSESSMENT — PAIN DESCRIPTION - LOCATION
LOCATION: HEAD
LOCATION: HEAD

## 2023-05-02 ASSESSMENT — LIFESTYLE VARIABLES: HOW OFTEN DO YOU HAVE A DRINK CONTAINING ALCOHOL: NEVER

## 2023-05-02 NOTE — ED TRIAGE NOTES
Pt arrived to dept via EMS. Pt states that she lost consciousness just prior to EMS being called. The episode was witnessed, but family not here to verify length of episode. Pt is unsure if she hit her head, but states that her head hurts. Pt denies taking blood thinners. Pt BS per EMS was 147. Pt has hx of seizures and states she had one around 1500 yesterday. Pt states that she keeps up on her seizure medication. Pt denies dizziness/lightheadedness. Pt awake, alert and oriented x 3.

## 2023-05-02 NOTE — ED PROVIDER NOTES
mass.      Tenderness: There is no abdominal tenderness. There is no guarding or rebound. Genitourinary:     Comments: Deferred  Musculoskeletal:         General: No deformity. Normal range of motion. Cervical back: Normal range of motion and neck supple. Skin:     General: Skin is warm. Findings: No erythema or rash. Neurological:      Mental Status: She is alert and oriented to person, place, and time. She is not disoriented. Cranial Nerves: No cranial nerve deficit. Motor: No atrophy or abnormal muscle tone. Coordination: Coordination normal.   Psychiatric:         Behavior: Behavior normal.         Thought Content: Thought content normal.       DIAGNOSTIC RESULTS     EKG: All EKG's are interpreted by the Emergency Department Physician who either signs or Co-signs this chart in the absence of acardiologist.    Interpreted by myself     RADIOLOGY:   Non-plain film images such as CT, Ultrasoundand MRI are read by the radiologist. Plain radiographic images are visualized and preliminarily interpreted by the emergency physician with the below findings:    CT negative    ED BEDSIDE ULTRASOUND:   Performed by ED Physician - none    LABS:  Labs Reviewed - No data to display    All other labs were withinnormal range or not returned as of this dictation. EMERGENCY DEPARTMENT COURSE and DIFFERENTIAL DIAGNOSIS/MDM:     PMH, Surgical Hx, FH, Social Hx reviewed by myself (ETOH usage, Tobacco usage, Drug usage reviewed by myself, no pertinent Hx)- No Pertinent Hx     Old records were reviewed by me     MDM  Disposition- Considered -   I estimate there is LOW risk for Sepsis, MI, Stroke, Tamponade, PTX, Toxicity or other life threatening etiology thus I consider the discharge disposition reasonable. The patient is at low risk for mortality based on demographic, history and clinical factors.  Given the best available information and clinical assessment, I estimate the risk of hospitalization
DISPOSITION/PLAN     DISPOSITION Decision To Discharge 05/02/2023 02:01:49 AM      PATIENT REFERRED TO:  FREDRICK Quiroga CNP  [address] [place] [address]    Schedule an appointment as soon as possible for a visit in 3 days  Follow up within 3 days, Return to ED sooner if symptoms worsen    Evan Toribio, [address] [place] [id number]    Call in 1 day  Follow up on your recent ED visit, As needed      DISCHARGE MEDICATIONS:  Discharge Medication List as of 5/2/2023  2:04 AM          DISCONTINUED MEDICATIONS:  Discharge Medication List as of 5/2/2023  2:04 AM                 (Please note that portions of this note were completed with a voice recognition program.  Efforts were made to edit the dictations but occasionally words are mis-transcribed.)    FREDRICK Sawant CNP (electronically signed)        FREDRICK Sawant CNP  05/02/23 7745

## 2023-05-02 NOTE — DISCHARGE INSTRUCTIONS
You were seen in the Emergency Department for head injury. Follow up with your PCP in 2-3 days and neurosurgery as we discussed. Return to the Emergency Department if you develop any new or worsening symptoms, chest pain, shortness of breath, worsening headache, or for any other concerns.

## 2023-05-02 NOTE — ED NOTES
Discharge and education instructions reviewed. Patient verbalized understanding, teach-back successful. Patient denied questions at this time. No acute distress noted. Patient instructed to follow-up as noted - return to emergency department if symptoms worsen. Patient verbalized understanding. Discharged per EDMD with discharge instructions.        Dilcia Stinson RN  05/02/23 9861

## 2023-05-11 ENCOUNTER — APPOINTMENT (OUTPATIENT)
Dept: CT IMAGING | Age: 43
End: 2023-05-11
Payer: COMMERCIAL

## 2023-05-11 ENCOUNTER — HOSPITAL ENCOUNTER (EMERGENCY)
Age: 43
Discharge: HOME | End: 2023-05-11
Attending: EMERGENCY MEDICINE
Payer: COMMERCIAL

## 2023-05-11 VITALS
RESPIRATION RATE: 18 BRPM | WEIGHT: 162.04 LBS | BODY MASS INDEX: 32.67 KG/M2 | DIASTOLIC BLOOD PRESSURE: 94 MMHG | HEIGHT: 59 IN | SYSTOLIC BLOOD PRESSURE: 139 MMHG | OXYGEN SATURATION: 96 % | TEMPERATURE: 98.3 F | HEART RATE: 98 BPM

## 2023-05-11 DIAGNOSIS — R51.9 ACUTE NONINTRACTABLE HEADACHE, UNSPECIFIED HEADACHE TYPE: ICD-10-CM

## 2023-05-11 DIAGNOSIS — R56.9 SEIZURE (HCC): Primary | ICD-10-CM

## 2023-05-11 LAB
ALBUMIN SERPL-MCNC: 3.8 G/DL (ref 3.4–5)
ALBUMIN/GLOB SERPL: 1.3 {RATIO} (ref 1.1–2.2)
ALP SERPL-CCNC: 188 U/L (ref 40–129)
ALT SERPL-CCNC: 65 U/L (ref 10–40)
ANION GAP SERPL CALCULATED.3IONS-SCNC: 9 MMOL/L (ref 3–16)
AST SERPL-CCNC: 18 U/L (ref 15–37)
BASOPHILS # BLD: 0.1 K/UL (ref 0–0.2)
BASOPHILS NFR BLD: 0.7 %
BILIRUB SERPL-MCNC: 0.3 MG/DL (ref 0–1)
BUN SERPL-MCNC: 7 MG/DL (ref 7–20)
CALCIUM SERPL-MCNC: 9 MG/DL (ref 8.3–10.6)
CHLORIDE SERPL-SCNC: 109 MMOL/L (ref 99–110)
CHP ED QC CHECK: 102
CO2 SERPL-SCNC: 25 MMOL/L (ref 21–32)
CREAT SERPL-MCNC: 0.6 MG/DL (ref 0.6–1.1)
DEPRECATED RDW RBC AUTO: 14.7 % (ref 12.4–15.4)
EOSINOPHIL # BLD: 0.1 K/UL (ref 0–0.6)
EOSINOPHIL NFR BLD: 1.2 %
GFR SERPLBLD CREATININE-BSD FMLA CKD-EPI: >60 ML/MIN/{1.73_M2}
GLUCOSE BLD-MCNC: 93 MG/DL (ref 70–99)
GLUCOSE SERPL-MCNC: 90 MG/DL (ref 70–99)
HCT VFR BLD AUTO: 42.3 % (ref 36–48)
HGB BLD-MCNC: 14.2 G/DL (ref 12–16)
LYMPHOCYTES # BLD: 2.2 K/UL (ref 1–5.1)
LYMPHOCYTES NFR BLD: 21.4 %
MCH RBC QN AUTO: 29.9 PG (ref 26–34)
MCHC RBC AUTO-ENTMCNC: 33.6 G/DL (ref 31–36)
MCV RBC AUTO: 88.9 FL (ref 80–100)
MONOCYTES # BLD: 0.6 K/UL (ref 0–1.3)
MONOCYTES NFR BLD: 6 %
NEUTROPHILS # BLD: 7.3 K/UL (ref 1.7–7.7)
NEUTROPHILS NFR BLD: 70.7 %
PERFORMED ON: NORMAL
PLATELET # BLD AUTO: 234 K/UL (ref 135–450)
PMV BLD AUTO: 8 FL (ref 5–10.5)
POTASSIUM SERPL-SCNC: 3.6 MMOL/L (ref 3.5–5.1)
PROT SERPL-MCNC: 6.8 G/DL (ref 6.4–8.2)
RBC # BLD AUTO: 4.75 M/UL (ref 4–5.2)
SODIUM SERPL-SCNC: 143 MMOL/L (ref 136–145)
WBC # BLD AUTO: 10.3 K/UL (ref 4–11)

## 2023-05-11 PROCEDURE — 2580000003 HC RX 258: Performed by: NURSE PRACTITIONER

## 2023-05-11 PROCEDURE — 96374 THER/PROPH/DIAG INJ IV PUSH: CPT

## 2023-05-11 PROCEDURE — 96361 HYDRATE IV INFUSION ADD-ON: CPT

## 2023-05-11 PROCEDURE — 80053 COMPREHEN METABOLIC PANEL: CPT

## 2023-05-11 PROCEDURE — 6360000002 HC RX W HCPCS: Performed by: NURSE PRACTITIONER

## 2023-05-11 PROCEDURE — 80175 DRUG SCREEN QUAN LAMOTRIGINE: CPT

## 2023-05-11 PROCEDURE — 99284 EMERGENCY DEPT VISIT MOD MDM: CPT

## 2023-05-11 PROCEDURE — 93005 ELECTROCARDIOGRAM TRACING: CPT | Performed by: EMERGENCY MEDICINE

## 2023-05-11 PROCEDURE — 70450 CT HEAD/BRAIN W/O DYE: CPT

## 2023-05-11 PROCEDURE — 85025 COMPLETE CBC W/AUTO DIFF WBC: CPT

## 2023-05-11 RX ORDER — 0.9 % SODIUM CHLORIDE 0.9 %
500 INTRAVENOUS SOLUTION INTRAVENOUS ONCE
Status: COMPLETED | OUTPATIENT
Start: 2023-05-11 | End: 2023-05-11

## 2023-05-11 RX ADMIN — SODIUM CHLORIDE 500 ML: 9 INJECTION, SOLUTION INTRAVENOUS at 21:04

## 2023-05-11 RX ADMIN — HYDROMORPHONE HYDROCHLORIDE 1 MG: 1 INJECTION, SOLUTION INTRAMUSCULAR; INTRAVENOUS; SUBCUTANEOUS at 21:03

## 2023-05-11 ASSESSMENT — PAIN - FUNCTIONAL ASSESSMENT: PAIN_FUNCTIONAL_ASSESSMENT: NONE - DENIES PAIN

## 2023-05-11 ASSESSMENT — ENCOUNTER SYMPTOMS
COUGH: 0
SORE THROAT: 0
ANAL BLEEDING: 0
NAUSEA: 0
BACK PAIN: 0
VOMITING: 0
ABDOMINAL PAIN: 0
SHORTNESS OF BREATH: 0
EYE PAIN: 0

## 2023-05-11 ASSESSMENT — PAIN DESCRIPTION - DESCRIPTORS: DESCRIPTORS: ACHING

## 2023-05-11 ASSESSMENT — PAIN SCALES - GENERAL
PAINLEVEL_OUTOF10: 6
PAINLEVEL_OUTOF10: 8

## 2023-05-11 ASSESSMENT — PAIN DESCRIPTION - LOCATION: LOCATION: HEAD

## 2023-05-11 NOTE — ED TRIAGE NOTES
Patient arrives via EMS at this time for complaints of possible seizure. Per patient she has a known history of seizures with  shunt in place. Patient A/O x3, but irritable with staff to answer any questions. Patient states she has chronic 6/10 head pain.

## 2023-05-12 LAB
EKG ATRIAL RATE: 106 BPM
EKG DIAGNOSIS: NORMAL
EKG P AXIS: 62 DEGREES
EKG P-R INTERVAL: 134 MS
EKG Q-T INTERVAL: 342 MS
EKG QRS DURATION: 80 MS
EKG QTC CALCULATION (BAZETT): 454 MS
EKG R AXIS: 11 DEGREES
EKG T AXIS: 51 DEGREES
EKG VENTRICULAR RATE: 106 BPM

## 2023-05-12 NOTE — DISCHARGE INSTRUCTIONS
Return to the emergency department for worsening symptoms including, not limited to, developing severe headache, fever, chills, sweats, weakness one-sided body versus the other, inability to ambulate, slurred speech, confusion, or other symptoms/concerns. Follow-up with your PCP for reevaluation next 1-2 days. Take your Lamictal well without missing doses. Call the provided number in order to establish a local neurosurgeon given yours is in [place].

## 2023-05-12 NOTE — ED NOTES
Pt resting in bed at this time, laying in a supine position with head of bed elevated . Call light remains in reach instructed pt how to use, and encouraged pt to call if needed assistance, no distress noted. RR even and unlabored, skin warm and dry. No needs at this time. Will continue to monitor closely.        Nicky Ohara RN  05/11/23 8585

## 2023-05-12 NOTE — ED NOTES
Pt resting in bed at this time, laying in a supine position with head of bed elevated . Call light remains in reach instructed pt how to use, and encouraged pt to call if needed assistance, no distress noted. RR even and unlabored, skin warm and dry. No needs at this time. Will continue to monitor closely.        Blake Campbell RN  05/11/23 7628

## 2023-05-12 NOTE — ED PROVIDER NOTES
[address] [facility]      Pt Name: Jorge Ceballos  MRN: [id number]  [place] 1980  Date of evaluation: 5/11/2023  Provider: Trang Kinsey, [address] [place] [address]  Chief Complaint   Patient presents with    Seizures       I have fully participated in the care of Jorge Ceballos and have had a face-to-face evaluation. I have reviewed and agree with all pertinent clinical information, and midlevel provider's history, and physical exam. I have also reviewed the labs and imaging studies and treatment plan. I have also reviewed and agree with the medications, allergies and past medical history section for this Jorge Ceballos. I agree with the diagnosis, and I concur. This patient is at risk for a communicable infection. Therefore, personal protection equipment consisting of a mask was worn for the exam.    Past Medical History:   Diagnosis Date    ADHD (attention deficit hyperactivity disorder) 1988    Depression with anxiety     Diabetes mellitus ([facility] [address]     pre-diabetes    Difficult intubation     airway swelled up    Encounter for imaging to screen for metal prior to MRI 06/01/2021    MRI Conditional Medtronic Non-Programmable shunt model#94483 implanted 10/30/2020 at [facility]. Normal Mode. 1.5T or 3.0T. ESBL (extended spectrum beta-lactamase) producing bacteria infection 11/06/2019    urine    Functional ovarian cysts 2008    rt ovary cyst x 2 yrs.     Headache(784.0)     migraines    History of blood transfusion     at birth    History of kidney stones     History of PCOS     had hysterectomy in 2016    Hydrocephalus [facility])     Hyperlipidemia     Irritable bowel syndrome 2004    had colonoscopy about 6 yrs ago    Meningitis 79/1604    Neutrophilic leukocytosis     Nicotine dependence     PONV (postoperative nausea and vomiting)     very nauseated and sometimes wakes up with a Migraine--happened once after brain surgery    Primary
(Optional):44519}    CC/HPI Summary, DDx, ED Course, and Reassessment: ***    Disposition Considerations (include 1 Tests not done, Shared Decision Making, Pt Expectation of Test or Tx.): ***  {Escalation of care, including admission/OBS considered:90744}      I am the Primary Clinician of Record. FINAL IMPRESSION    No diagnosis found. DISPOSITION/PLAN     DISPOSITION     PATIENT REFERRED TO:  No follow-up provider specified.     DISCHARGE MEDICATIONS:  New Prescriptions    No medications on file       DISCONTINUED MEDICATIONS:  Discontinued Medications    No medications on file              (Please note that portions of this note were completed with a voice recognition program.  Efforts were made to edit the dictations but occasionally words are mis-transcribed.)    FREDRICK Winchester - CNP (electronically signed)

## 2023-05-13 LAB — LAMOTRIGINE SERPL-MCNC: <0.9 UG/ML (ref 3–15)

## 2023-05-14 ENCOUNTER — APPOINTMENT (OUTPATIENT)
Dept: GENERAL RADIOLOGY | Age: 43
End: 2023-05-14
Payer: COMMERCIAL

## 2023-05-14 ENCOUNTER — HOSPITAL ENCOUNTER (EMERGENCY)
Age: 43
Discharge: HOME | End: 2023-05-15
Attending: EMERGENCY MEDICINE
Payer: COMMERCIAL

## 2023-05-14 ENCOUNTER — APPOINTMENT (OUTPATIENT)
Dept: CT IMAGING | Age: 43
End: 2023-05-14
Payer: COMMERCIAL

## 2023-05-14 DIAGNOSIS — R51.9 NONINTRACTABLE HEADACHE, UNSPECIFIED CHRONICITY PATTERN, UNSPECIFIED HEADACHE TYPE: Primary | ICD-10-CM

## 2023-05-14 LAB
ANION GAP SERPL CALCULATED.3IONS-SCNC: 14 MMOL/L (ref 3–16)
BASOPHILS # BLD: 0.1 K/UL (ref 0–0.2)
BASOPHILS NFR BLD: 0.8 %
BUN SERPL-MCNC: 6 MG/DL (ref 7–20)
CALCIUM SERPL-MCNC: 8.9 MG/DL (ref 8.3–10.6)
CHLORIDE SERPL-SCNC: 103 MMOL/L (ref 99–110)
CO2 SERPL-SCNC: 24 MMOL/L (ref 21–32)
CREAT SERPL-MCNC: 0.5 MG/DL (ref 0.6–1.1)
DEPRECATED RDW RBC AUTO: 14.8 % (ref 12.4–15.4)
EOSINOPHIL # BLD: 0.2 K/UL (ref 0–0.6)
EOSINOPHIL NFR BLD: 1.5 %
GFR SERPLBLD CREATININE-BSD FMLA CKD-EPI: >60 ML/MIN/{1.73_M2}
GLUCOSE SERPL-MCNC: 102 MG/DL (ref 70–99)
HCT VFR BLD AUTO: 42.3 % (ref 36–48)
HGB BLD-MCNC: 14 G/DL (ref 12–16)
LYMPHOCYTES # BLD: 2.7 K/UL (ref 1–5.1)
LYMPHOCYTES NFR BLD: 24.7 %
MAGNESIUM SERPL-MCNC: 2.2 MG/DL (ref 1.8–2.4)
MCH RBC QN AUTO: 29.3 PG (ref 26–34)
MCHC RBC AUTO-ENTMCNC: 33.2 G/DL (ref 31–36)
MCV RBC AUTO: 88.2 FL (ref 80–100)
MONOCYTES # BLD: 0.6 K/UL (ref 0–1.3)
MONOCYTES NFR BLD: 5.2 %
NEUTROPHILS # BLD: 7.5 K/UL (ref 1.7–7.7)
NEUTROPHILS NFR BLD: 67.8 %
PLATELET # BLD AUTO: 290 K/UL (ref 135–450)
PMV BLD AUTO: 8.2 FL (ref 5–10.5)
POTASSIUM SERPL-SCNC: 3.4 MMOL/L (ref 3.5–5.1)
RBC # BLD AUTO: 4.8 M/UL (ref 4–5.2)
SODIUM SERPL-SCNC: 141 MMOL/L (ref 136–145)
WBC # BLD AUTO: 11.1 K/UL (ref 4–11)

## 2023-05-14 PROCEDURE — 2580000003 HC RX 258: Performed by: EMERGENCY MEDICINE

## 2023-05-14 PROCEDURE — 73560 X-RAY EXAM OF KNEE 1 OR 2: CPT

## 2023-05-14 PROCEDURE — 99284 EMERGENCY DEPT VISIT MOD MDM: CPT

## 2023-05-14 PROCEDURE — 80048 BASIC METABOLIC PNL TOTAL CA: CPT

## 2023-05-14 PROCEDURE — 96365 THER/PROPH/DIAG IV INF INIT: CPT

## 2023-05-14 PROCEDURE — 85025 COMPLETE CBC W/AUTO DIFF WBC: CPT

## 2023-05-14 PROCEDURE — 83735 ASSAY OF MAGNESIUM: CPT

## 2023-05-14 PROCEDURE — 36415 COLL VENOUS BLD VENIPUNCTURE: CPT

## 2023-05-14 PROCEDURE — 70450 CT HEAD/BRAIN W/O DYE: CPT

## 2023-05-14 PROCEDURE — 6360000002 HC RX W HCPCS: Performed by: EMERGENCY MEDICINE

## 2023-05-14 PROCEDURE — 96375 TX/PRO/DX INJ NEW DRUG ADDON: CPT

## 2023-05-14 RX ORDER — ONDANSETRON 2 MG/ML
4 INJECTION INTRAMUSCULAR; INTRAVENOUS ONCE
Status: COMPLETED | OUTPATIENT
Start: 2023-05-14 | End: 2023-05-14

## 2023-05-14 RX ORDER — BUTALBITAL, ACETAMINOPHEN AND CAFFEINE 300; 40; 50 MG/1; MG/1; MG/1
1 CAPSULE ORAL EVERY 6 HOURS PRN
Qty: 16 CAPSULE | Refills: 0 | Status: SHIPPED | OUTPATIENT
Start: 2023-05-14 | End: 2023-05-18

## 2023-05-14 RX ORDER — 0.9 % SODIUM CHLORIDE 0.9 %
500 INTRAVENOUS SOLUTION INTRAVENOUS ONCE
Status: COMPLETED | OUTPATIENT
Start: 2023-05-14 | End: 2023-05-15

## 2023-05-14 RX ADMIN — Medication 12.5 MG: at 23:16

## 2023-05-14 RX ADMIN — SODIUM CHLORIDE 500 ML: 9 INJECTION, SOLUTION INTRAVENOUS at 22:58

## 2023-05-14 RX ADMIN — HYDROMORPHONE HYDROCHLORIDE 0.5 MG: 1 INJECTION, SOLUTION INTRAMUSCULAR; INTRAVENOUS; SUBCUTANEOUS at 22:53

## 2023-05-14 RX ADMIN — ONDANSETRON 4 MG: 2 INJECTION INTRAMUSCULAR; INTRAVENOUS at 22:13

## 2023-05-14 ASSESSMENT — PAIN SCALES - GENERAL
PAINLEVEL_OUTOF10: 10
PAINLEVEL_OUTOF10: 9
PAINLEVEL_OUTOF10: 10

## 2023-05-14 ASSESSMENT — PAIN DESCRIPTION - ORIENTATION: ORIENTATION: POSTERIOR

## 2023-05-14 ASSESSMENT — PAIN DESCRIPTION - DESCRIPTORS: DESCRIPTORS: ACHING

## 2023-05-14 ASSESSMENT — PAIN DESCRIPTION - LOCATION
LOCATION: HEAD

## 2023-05-14 ASSESSMENT — ENCOUNTER SYMPTOMS: BACK PAIN: 0

## 2023-05-14 ASSESSMENT — PAIN DESCRIPTION - PAIN TYPE: TYPE: ACUTE PAIN

## 2023-05-14 ASSESSMENT — PAIN - FUNCTIONAL ASSESSMENT: PAIN_FUNCTIONAL_ASSESSMENT: 0-10

## 2023-05-15 VITALS
BODY MASS INDEX: 30.24 KG/M2 | WEIGHT: 150 LBS | DIASTOLIC BLOOD PRESSURE: 117 MMHG | HEIGHT: 59 IN | OXYGEN SATURATION: 99 % | SYSTOLIC BLOOD PRESSURE: 154 MMHG | HEART RATE: 68 BPM | RESPIRATION RATE: 18 BRPM | TEMPERATURE: 98.5 F

## 2023-05-15 NOTE — ED PROVIDER NOTES
[address] [facility]      Pt Name: Farrukh Whitaker  MRN: [id number]  [place] 1980  Date of evaluation: 5/14/2023  Provider: Mallory Leonardo MD    CHIEF COMPLAINT       Chief Complaint   Patient presents with    Headache     Patient states she has been blacking out multiple time today. States she fell but did not hit her head. \"Back of my head hurts, but not from fall\"         HISTORY OF PRESENT ILLNESS   (Location/Symptom, Timing/Onset, Context/Setting, Quality, Duration, Modifying Factors, Severity)  Note limiting factors. Farrukh Whitaker is a 38 y.o. female who presents to the emergency department intermittent headache and left knee pain after fall    HPI    This is a 46-year-old  female with by chart has a history of congenital hydrocephalus with shunt placement. She has been to multiple hospitals with this issue in the area and apparently has moved up here from [place] about 2 years ago. She presents with her usual headache dull achy no other aggravating leaving factors mild to moderate in severity. She denies any focal neurologic complaints does state that she fell which typically occurs with this headache and complains of some left knee pain. No fevers chills any history of blackouts is more that she feels like she is about to blackout. Nursing Notes were reviewed. REVIEW OF SYSTEMS    (2-9 systems for level 4, 10 or more for level 5)     Review of Systems   Constitutional:  Negative for chills and fever. Musculoskeletal:  Positive for arthralgias. Negative for back pain. Neurological:  Negative for tremors, seizures, syncope, weakness and numbness. All other systems reviewed and are negative. Except as noted above the remainder of the review of systems was reviewed and negative.        PAST MEDICAL HISTORY     Past Medical History:   Diagnosis Date    ADHD (attention deficit hyperactivity disorder) 1988    Depression with

## 2023-05-15 NOTE — ED NOTES
Discharge and education instructions reviewed. Patient verbalized understanding, teach-back successful. Patient denied questions at this time. No acute distress noted. Patient instructed to follow-up as noted - return to emergency department if symptoms worsen. Patient verbalized understanding. Discharged per EDMD with discharge instructions.         Pauline Peters RN  05/15/23 4255

## 2023-05-16 ENCOUNTER — TELEPHONE (OUTPATIENT)
Dept: SURGERY | Age: 43
End: 2023-05-16

## 2023-05-16 NOTE — TELEPHONE ENCOUNTER
Tammy Malcolm, with [facility] pharmacy, called in stating that he received the prescription for the patient's diazepam, but another provider is already prescribing lorazepam    Tammy Malcolm would like to know if he should fill the patient's prescription for diazepam     Please contact Tammy Malcolm at [phone]

## 2023-05-16 NOTE — TELEPHONE ENCOUNTER
Returned call to pharmacy. Per Dr. Henry Pro, \" She only needs 1 script\" so instructed pharmacist to disregard diazepam prescription.

## 2023-05-18 ENCOUNTER — APPOINTMENT (OUTPATIENT)
Dept: GENERAL RADIOLOGY | Age: 43
End: 2023-05-18
Payer: COMMERCIAL

## 2023-05-18 ENCOUNTER — TELEPHONE (OUTPATIENT)
Dept: PRIMARY CARE CLINIC | Age: 43
End: 2023-05-18

## 2023-05-18 ENCOUNTER — HOSPITAL ENCOUNTER (EMERGENCY)
Age: 43
Discharge: HOME | End: 2023-05-18
Payer: COMMERCIAL

## 2023-05-18 VITALS
RESPIRATION RATE: 22 BRPM | DIASTOLIC BLOOD PRESSURE: 64 MMHG | SYSTOLIC BLOOD PRESSURE: 103 MMHG | WEIGHT: 164.02 LBS | HEIGHT: 59 IN | HEART RATE: 82 BPM | OXYGEN SATURATION: 99 % | BODY MASS INDEX: 33.07 KG/M2 | TEMPERATURE: 98.3 F

## 2023-05-18 DIAGNOSIS — R10.9 FLANK PAIN: ICD-10-CM

## 2023-05-18 DIAGNOSIS — R07.9 CHEST PAIN, UNSPECIFIED TYPE: Primary | ICD-10-CM

## 2023-05-18 DIAGNOSIS — Z76.5 DRUG-SEEKING BEHAVIOR: ICD-10-CM

## 2023-05-18 DIAGNOSIS — F41.9 ANXIOUSNESS: ICD-10-CM

## 2023-05-18 LAB
ALBUMIN SERPL-MCNC: 4.3 G/DL (ref 3.4–5)
ALP SERPL-CCNC: 140 U/L (ref 40–129)
ALT SERPL-CCNC: 17 U/L (ref 10–40)
ANION GAP SERPL CALCULATED.3IONS-SCNC: 13 MMOL/L (ref 3–16)
AST SERPL-CCNC: 26 U/L (ref 15–37)
BASOPHILS # BLD: 0.1 K/UL (ref 0–0.2)
BASOPHILS NFR BLD: 0.7 %
BILIRUB DIRECT SERPL-MCNC: <0.2 MG/DL (ref 0–0.3)
BILIRUB INDIRECT SERPL-MCNC: ABNORMAL MG/DL (ref 0–1)
BILIRUB SERPL-MCNC: 0.5 MG/DL (ref 0–1)
BILIRUB UR QL STRIP.AUTO: NEGATIVE
BUN SERPL-MCNC: 9 MG/DL (ref 7–20)
CALCIUM SERPL-MCNC: 9.9 MG/DL (ref 8.3–10.6)
CHLORIDE SERPL-SCNC: 102 MMOL/L (ref 99–110)
CLARITY UR: CLEAR
CO2 SERPL-SCNC: 22 MMOL/L (ref 21–32)
COLOR UR: YELLOW
CREAT SERPL-MCNC: 0.7 MG/DL (ref 0.6–1.1)
DEPRECATED RDW RBC AUTO: 14.7 % (ref 12.4–15.4)
EOSINOPHIL # BLD: 0.2 K/UL (ref 0–0.6)
EOSINOPHIL NFR BLD: 1.4 %
GFR SERPLBLD CREATININE-BSD FMLA CKD-EPI: >60 ML/MIN/{1.73_M2}
GLUCOSE SERPL-MCNC: 152 MG/DL (ref 70–99)
GLUCOSE UR STRIP.AUTO-MCNC: NEGATIVE MG/DL
HCT VFR BLD AUTO: 46.5 % (ref 36–48)
HGB BLD-MCNC: 15.6 G/DL (ref 12–16)
HGB UR QL STRIP.AUTO: NEGATIVE
KETONES UR STRIP.AUTO-MCNC: NEGATIVE MG/DL
LEUKOCYTE ESTERASE UR QL STRIP.AUTO: NEGATIVE
LIPASE SERPL-CCNC: 17 U/L (ref 13–60)
LYMPHOCYTES # BLD: 1.7 K/UL (ref 1–5.1)
LYMPHOCYTES NFR BLD: 15 %
MCH RBC QN AUTO: 29.1 PG (ref 26–34)
MCHC RBC AUTO-ENTMCNC: 33.6 G/DL (ref 31–36)
MCV RBC AUTO: 86.8 FL (ref 80–100)
MONOCYTES # BLD: 0.4 K/UL (ref 0–1.3)
MONOCYTES NFR BLD: 3.7 %
NEUTROPHILS # BLD: 9.2 K/UL (ref 1.7–7.7)
NEUTROPHILS NFR BLD: 79.2 %
NITRITE UR QL STRIP.AUTO: NEGATIVE
PH UR STRIP.AUTO: 7 [PH] (ref 5–8)
PLATELET # BLD AUTO: 393 K/UL (ref 135–450)
PMV BLD AUTO: 8.1 FL (ref 5–10.5)
POTASSIUM SERPL-SCNC: 4.7 MMOL/L (ref 3.5–5.1)
PROT SERPL-MCNC: 8.2 G/DL (ref 6.4–8.2)
PROT UR STRIP.AUTO-MCNC: NEGATIVE MG/DL
RBC # BLD AUTO: 5.36 M/UL (ref 4–5.2)
SODIUM SERPL-SCNC: 137 MMOL/L (ref 136–145)
SP GR UR STRIP.AUTO: 1.01 (ref 1–1.03)
TROPONIN, HIGH SENSITIVITY: <6 NG/L (ref 0–14)
TROPONIN, HIGH SENSITIVITY: <6 NG/L (ref 0–14)
UA COMPLETE W REFLEX CULTURE PNL UR: NORMAL
UA DIPSTICK W REFLEX MICRO PNL UR: NORMAL
URN SPEC COLLECT METH UR: NORMAL
UROBILINOGEN UR STRIP-ACNC: 0.2 E.U./DL
WBC # BLD AUTO: 11.7 K/UL (ref 4–11)

## 2023-05-18 PROCEDURE — 81003 URINALYSIS AUTO W/O SCOPE: CPT

## 2023-05-18 PROCEDURE — 83690 ASSAY OF LIPASE: CPT

## 2023-05-18 PROCEDURE — 96375 TX/PRO/DX INJ NEW DRUG ADDON: CPT

## 2023-05-18 PROCEDURE — 6360000002 HC RX W HCPCS: Performed by: NURSE PRACTITIONER

## 2023-05-18 PROCEDURE — 36415 COLL VENOUS BLD VENIPUNCTURE: CPT

## 2023-05-18 PROCEDURE — 96372 THER/PROPH/DIAG INJ SC/IM: CPT

## 2023-05-18 PROCEDURE — 99285 EMERGENCY DEPT VISIT HI MDM: CPT

## 2023-05-18 PROCEDURE — 80048 BASIC METABOLIC PNL TOTAL CA: CPT

## 2023-05-18 PROCEDURE — 84484 ASSAY OF TROPONIN QUANT: CPT

## 2023-05-18 PROCEDURE — 80076 HEPATIC FUNCTION PANEL: CPT

## 2023-05-18 PROCEDURE — 2580000003 HC RX 258: Performed by: NURSE PRACTITIONER

## 2023-05-18 PROCEDURE — 96374 THER/PROPH/DIAG INJ IV PUSH: CPT

## 2023-05-18 PROCEDURE — 6370000000 HC RX 637 (ALT 250 FOR IP): Performed by: NURSE PRACTITIONER

## 2023-05-18 PROCEDURE — 71046 X-RAY EXAM CHEST 2 VIEWS: CPT

## 2023-05-18 PROCEDURE — 93005 ELECTROCARDIOGRAM TRACING: CPT | Performed by: NURSE PRACTITIONER

## 2023-05-18 PROCEDURE — 85025 COMPLETE CBC W/AUTO DIFF WBC: CPT

## 2023-05-18 RX ORDER — 0.9 % SODIUM CHLORIDE 0.9 %
1000 INTRAVENOUS SOLUTION INTRAVENOUS ONCE
Status: COMPLETED | OUTPATIENT
Start: 2023-05-18 | End: 2023-05-18

## 2023-05-18 RX ORDER — KETOROLAC TROMETHAMINE 30 MG/ML
60 INJECTION, SOLUTION INTRAMUSCULAR; INTRAVENOUS ONCE
Status: COMPLETED | OUTPATIENT
Start: 2023-05-18 | End: 2023-05-18

## 2023-05-18 RX ORDER — TAMSULOSIN HYDROCHLORIDE 0.4 MG/1
0.4 CAPSULE ORAL ONCE
Status: COMPLETED | OUTPATIENT
Start: 2023-05-18 | End: 2023-05-18

## 2023-05-18 RX ORDER — KETOROLAC TROMETHAMINE 10 MG/1
10 TABLET, FILM COATED ORAL ONCE
Status: DISCONTINUED | OUTPATIENT
Start: 2023-05-18 | End: 2023-05-18 | Stop reason: ALTCHOICE

## 2023-05-18 RX ORDER — ONDANSETRON 2 MG/ML
4 INJECTION INTRAMUSCULAR; INTRAVENOUS ONCE
Status: COMPLETED | OUTPATIENT
Start: 2023-05-18 | End: 2023-05-18

## 2023-05-18 RX ORDER — LORAZEPAM 2 MG/ML
1 INJECTION INTRAMUSCULAR ONCE
Status: COMPLETED | OUTPATIENT
Start: 2023-05-18 | End: 2023-05-18

## 2023-05-18 RX ADMIN — ONDANSETRON 4 MG: 2 INJECTION INTRAMUSCULAR; INTRAVENOUS at 12:05

## 2023-05-18 RX ADMIN — KETOROLAC TROMETHAMINE 60 MG: 30 INJECTION, SOLUTION INTRAMUSCULAR at 13:58

## 2023-05-18 RX ADMIN — LORAZEPAM 1 MG: 2 INJECTION INTRAMUSCULAR; INTRAVENOUS at 12:06

## 2023-05-18 RX ADMIN — SODIUM CHLORIDE 1000 ML: 9 INJECTION, SOLUTION INTRAVENOUS at 12:28

## 2023-05-18 RX ADMIN — TAMSULOSIN HYDROCHLORIDE 0.4 MG: 0.4 CAPSULE ORAL at 13:57

## 2023-05-18 ASSESSMENT — PAIN SCALES - GENERAL
PAINLEVEL_OUTOF10: 10
PAINLEVEL_OUTOF10: 10
PAINLEVEL_OUTOF10: 6

## 2023-05-18 ASSESSMENT — PAIN - FUNCTIONAL ASSESSMENT: PAIN_FUNCTIONAL_ASSESSMENT: 0-10

## 2023-05-18 NOTE — ED PROVIDER NOTES
EKG Interpretation    Interpreted by emergency department physician  Time performed: 9475  Time read: 1150    Rhythm: Sinus  Ventricular Rate: 85  QRS Axis: 12  Ectopy: None  Conduction: Normal sinus rhythm  ST Segments: normal  T Waves: normal  Q Waves: None    Other findings: Motion artifact but EKG is readable    Compared to EKG on: 5/11/2023 and appears unchanged    Clinical Impression: Normal sinus rhythm, normal EKG. There is motion artifact but EKG is readable. This is compared to an EKG on 5/11/2023 and appears unchanged. DO Tanya Hameed DO    EKG Interpretation #2    Interpreted by emergency department physician  Time performed:    Time read: 1411    Rhythm: Sinus  Ventricular Rate: 72  QRS Axis: 16  Ectopy: None  Conduction: Normal sinus rhythm  ST Segments: normal  T Waves: normal  Q Waves: None    Other findings: Motion artifact but EKG is readable    Compared to EKG on: 5/18/2023 at 11:38 AM    Clinical Impression: Normal sinus rhythm, normal EKG. There is motion artifact but EKG is readable. This is compared to an EKG on 5/18/2023 at 11:38 AM and is unchanged.     Lopez Jones DO    05/18/23 Lior Vora DO  05/18/23 1539

## 2023-05-18 NOTE — ED NOTES
Discharge and education instructions reviewed. Patient verbalized understanding, teach-back successful. Patient denied questions at this time. No acute distress noted. Patient instructed to follow-up as noted - return to emergency department if symptoms worsen. Patient verbalized understanding. Discharged per EDMD with discharge instructions.        Qing Montana RN  05/18/23 5861

## 2023-05-18 NOTE — TELEPHONE ENCOUNTER
Pt called and stated that she is having chest pain and having a hard time breathing. I told her that she needs to go to the ER. Made the provider awear.  Hs

## 2023-05-18 NOTE — ED NOTES
PIV access attempted twice without successful access.  Laurel Barr, RN to attempt      Geoffrey Arteaga, RN  05/18/23 5473

## 2023-05-19 ENCOUNTER — OFFICE VISIT (OUTPATIENT)
Dept: SURGERY | Age: 43
End: 2023-05-19
Payer: COMMERCIAL

## 2023-05-19 VITALS
HEART RATE: 93 BPM | WEIGHT: 156 LBS | BODY MASS INDEX: 31.45 KG/M2 | OXYGEN SATURATION: 98 % | DIASTOLIC BLOOD PRESSURE: 86 MMHG | HEIGHT: 59 IN | SYSTOLIC BLOOD PRESSURE: 139 MMHG

## 2023-05-19 DIAGNOSIS — K62.5 RECTAL BLEEDING: ICD-10-CM

## 2023-05-19 DIAGNOSIS — K64.2 GRADE III HEMORRHOIDS: Primary | ICD-10-CM

## 2023-05-19 LAB
EKG ATRIAL RATE: 72 BPM
EKG ATRIAL RATE: 85 BPM
EKG DIAGNOSIS: NORMAL
EKG DIAGNOSIS: NORMAL
EKG P AXIS: 41 DEGREES
EKG P AXIS: 50 DEGREES
EKG P-R INTERVAL: 122 MS
EKG P-R INTERVAL: 128 MS
EKG Q-T INTERVAL: 392 MS
EKG Q-T INTERVAL: 418 MS
EKG QRS DURATION: 78 MS
EKG QRS DURATION: 86 MS
EKG QTC CALCULATION (BAZETT): 457 MS
EKG QTC CALCULATION (BAZETT): 466 MS
EKG R AXIS: 12 DEGREES
EKG R AXIS: 16 DEGREES
EKG T AXIS: 24 DEGREES
EKG T AXIS: 47 DEGREES
EKG VENTRICULAR RATE: 72 BPM
EKG VENTRICULAR RATE: 85 BPM

## 2023-05-19 PROCEDURE — 3079F DIAST BP 80-89 MM HG: CPT | Performed by: SURGERY

## 2023-05-19 PROCEDURE — 4004F PT TOBACCO SCREEN RCVD TLK: CPT | Performed by: SURGERY

## 2023-05-19 PROCEDURE — 3075F SYST BP GE 130 - 139MM HG: CPT | Performed by: SURGERY

## 2023-05-19 PROCEDURE — G8427 DOCREV CUR MEDS BY ELIG CLIN: HCPCS | Performed by: SURGERY

## 2023-05-19 PROCEDURE — 93010 ELECTROCARDIOGRAM REPORT: CPT | Performed by: INTERNAL MEDICINE

## 2023-05-19 PROCEDURE — 99213 OFFICE O/P EST LOW 20 MIN: CPT | Performed by: SURGERY

## 2023-05-19 PROCEDURE — G8417 CALC BMI ABV UP PARAM F/U: HCPCS | Performed by: SURGERY

## 2023-05-19 NOTE — PROGRESS NOTES
[facility] [place] [facility]ANS [facility]  [address] [facility] [address]  [address] [address]  Dept: [phone]  Dept Fax: [phone]  Loc: [phone]    Visit Date: 5/19/2023    Kathi Ny is a 38 y.o. female who presents today for: Follow-up (RBL)      HPI:       Kathi Ny is a 38 y.o. female hemorrhoids. .  She tells me she was in fairly severe pain after procedure does not wish to have additional banding. Fortunately, her bleeding has improved    Past Medical History:   Diagnosis Date    ADHD (attention deficit hyperactivity disorder) 1988    Depression with anxiety     Diabetes mellitus ([address]     pre-diabetes    Difficult intubation     airway swelled up    Encounter for imaging to screen for metal prior to MRI 06/01/2021    MRI Conditional Medtronic Non-Programmable shunt model#[id number] implanted 10/30/2020 at [facility]. Normal Mode. 1.5T or 3.0T. ESBL (extended spectrum beta-lactamase) producing bacteria infection 11/06/2019    urine    Functional ovarian cysts 2008    rt ovary cyst x 2 yrs.     Headache(784.0)     migraines    History of blood transfusion     at birth    History of kidney stones     History of PCOS     had hysterectomy in 2016    Hydrocephalus [facility])     Hyperlipidemia     Irritable bowel syndrome 2004    had colonoscopy about 6 yrs ago    Meningitis 61/7967    Neutrophilic leukocytosis     Nicotine dependence     PONV (postoperative nausea and vomiting)     very nauseated and sometimes wakes up with a Migraine--happened once after brain surgery    Primary osteoarthritis of left knee 07/01/2016    S/P cone biopsy of cervix 2004    Scoliosis 1990.s    Seizures ([address]     twice--last one in June2022     (ventriculoperitoneal) shunt status 1982    hydrocephalus f/w Neurosurgeon at [facility]    Wears glasses     reading     Past Surgical History:   Procedure Laterality Date    ABDOMEN SURGERY N/A 7/14/2021

## 2023-05-23 ENCOUNTER — TELEPHONE (OUTPATIENT)
Dept: SURGERY | Age: 43
End: 2023-05-23

## 2023-05-23 ENCOUNTER — APPOINTMENT (OUTPATIENT)
Dept: CT IMAGING | Age: 43
End: 2023-05-23
Payer: COMMERCIAL

## 2023-05-23 ENCOUNTER — HOSPITAL ENCOUNTER (EMERGENCY)
Age: 43
Discharge: HOME | End: 2023-05-23
Payer: COMMERCIAL

## 2023-05-23 VITALS
SYSTOLIC BLOOD PRESSURE: 131 MMHG | DIASTOLIC BLOOD PRESSURE: 91 MMHG | RESPIRATION RATE: 16 BRPM | HEART RATE: 74 BPM | BODY MASS INDEX: 31.02 KG/M2 | WEIGHT: 153.88 LBS | OXYGEN SATURATION: 100 % | HEIGHT: 59 IN | TEMPERATURE: 98.6 F

## 2023-05-23 DIAGNOSIS — R10.30 LOWER ABDOMINAL PAIN: Primary | ICD-10-CM

## 2023-05-23 LAB
ANION GAP SERPL CALCULATED.3IONS-SCNC: 13 MMOL/L (ref 3–16)
BASOPHILS # BLD: 0.1 K/UL (ref 0–0.2)
BASOPHILS NFR BLD: 0.8 %
BUN SERPL-MCNC: 8 MG/DL (ref 7–20)
CALCIUM SERPL-MCNC: 9.5 MG/DL (ref 8.3–10.6)
CHLORIDE SERPL-SCNC: 103 MMOL/L (ref 99–110)
CO2 SERPL-SCNC: 20 MMOL/L (ref 21–32)
CREAT SERPL-MCNC: 0.6 MG/DL (ref 0.6–1.1)
DEPRECATED RDW RBC AUTO: 14.7 % (ref 12.4–15.4)
EOSINOPHIL # BLD: 0.1 K/UL (ref 0–0.6)
EOSINOPHIL NFR BLD: 0.9 %
GFR SERPLBLD CREATININE-BSD FMLA CKD-EPI: >60 ML/MIN/{1.73_M2}
GLUCOSE SERPL-MCNC: 101 MG/DL (ref 70–99)
HCT VFR BLD AUTO: 44.7 % (ref 36–48)
HGB BLD-MCNC: 15.1 G/DL (ref 12–16)
LACTATE BLDV-SCNC: 0.7 MMOL/L (ref 0.4–1.9)
LYMPHOCYTES # BLD: 2.3 K/UL (ref 1–5.1)
LYMPHOCYTES NFR BLD: 19.8 %
MCH RBC QN AUTO: 30.3 PG (ref 26–34)
MCHC RBC AUTO-ENTMCNC: 33.8 G/DL (ref 31–36)
MCV RBC AUTO: 89.8 FL (ref 80–100)
MONOCYTES # BLD: 0.6 K/UL (ref 0–1.3)
MONOCYTES NFR BLD: 5 %
NEUTROPHILS # BLD: 8.4 K/UL (ref 1.7–7.7)
NEUTROPHILS NFR BLD: 73.5 %
PLATELET # BLD AUTO: 322 K/UL (ref 135–450)
PMV BLD AUTO: 8.5 FL (ref 5–10.5)
POTASSIUM SERPL-SCNC: 4.3 MMOL/L (ref 3.5–5.1)
RBC # BLD AUTO: 4.98 M/UL (ref 4–5.2)
SODIUM SERPL-SCNC: 136 MMOL/L (ref 136–145)
WBC # BLD AUTO: 11.4 K/UL (ref 4–11)

## 2023-05-23 PROCEDURE — 96365 THER/PROPH/DIAG IV INF INIT: CPT

## 2023-05-23 PROCEDURE — 2580000003 HC RX 258: Performed by: NURSE PRACTITIONER

## 2023-05-23 PROCEDURE — 83605 ASSAY OF LACTIC ACID: CPT

## 2023-05-23 PROCEDURE — 6370000000 HC RX 637 (ALT 250 FOR IP): Performed by: NURSE PRACTITIONER

## 2023-05-23 PROCEDURE — 74176 CT ABD & PELVIS W/O CONTRAST: CPT

## 2023-05-23 PROCEDURE — 85025 COMPLETE CBC W/AUTO DIFF WBC: CPT

## 2023-05-23 PROCEDURE — 6360000002 HC RX W HCPCS: Performed by: NURSE PRACTITIONER

## 2023-05-23 PROCEDURE — 80048 BASIC METABOLIC PNL TOTAL CA: CPT

## 2023-05-23 PROCEDURE — 96375 TX/PRO/DX INJ NEW DRUG ADDON: CPT

## 2023-05-23 PROCEDURE — 99284 EMERGENCY DEPT VISIT MOD MDM: CPT

## 2023-05-23 RX ORDER — MORPHINE SULFATE 4 MG/ML
4 INJECTION, SOLUTION INTRAMUSCULAR; INTRAVENOUS ONCE
Status: COMPLETED | OUTPATIENT
Start: 2023-05-23 | End: 2023-05-23

## 2023-05-23 RX ORDER — PROMETHAZINE HYDROCHLORIDE 25 MG/1
25 TABLET ORAL ONCE
Status: COMPLETED | OUTPATIENT
Start: 2023-05-23 | End: 2023-05-23

## 2023-05-23 RX ORDER — SODIUM CHLORIDE, SODIUM LACTATE, POTASSIUM CHLORIDE, CALCIUM CHLORIDE 600; 310; 30; 20 MG/100ML; MG/100ML; MG/100ML; MG/100ML
INJECTION, SOLUTION INTRAVENOUS CONTINUOUS
Status: DISCONTINUED | OUTPATIENT
Start: 2023-05-23 | End: 2023-05-23 | Stop reason: HOSPADM

## 2023-05-23 RX ADMIN — MORPHINE SULFATE 4 MG: 4 INJECTION, SOLUTION INTRAMUSCULAR; INTRAVENOUS at 19:25

## 2023-05-23 RX ADMIN — PROMETHAZINE HYDROCHLORIDE 25 MG: 25 TABLET ORAL at 20:21

## 2023-05-23 RX ADMIN — Medication 25 MG: at 19:28

## 2023-05-23 RX ADMIN — SODIUM CHLORIDE, POTASSIUM CHLORIDE, SODIUM LACTATE AND CALCIUM CHLORIDE: 600; 310; 30; 20 INJECTION, SOLUTION INTRAVENOUS at 19:24

## 2023-05-23 ASSESSMENT — PAIN DESCRIPTION - LOCATION
LOCATION: ABDOMEN

## 2023-05-23 ASSESSMENT — PAIN SCALES - GENERAL
PAINLEVEL_OUTOF10: 10
PAINLEVEL_OUTOF10: 6
PAINLEVEL_OUTOF10: 9

## 2023-05-23 ASSESSMENT — PAIN DESCRIPTION - ORIENTATION: ORIENTATION: LEFT

## 2023-05-23 ASSESSMENT — ENCOUNTER SYMPTOMS
RESPIRATORY NEGATIVE: 1
NAUSEA: 1
VOMITING: 1
ABDOMINAL PAIN: 1
BLOOD IN STOOL: 1

## 2023-05-23 ASSESSMENT — PAIN DESCRIPTION - DESCRIPTORS
DESCRIPTORS: SHARP
DESCRIPTORS: SHARP

## 2023-05-23 ASSESSMENT — PAIN - FUNCTIONAL ASSESSMENT: PAIN_FUNCTIONAL_ASSESSMENT: 0-10

## 2023-05-23 NOTE — ED NOTES
PT is a difficult stick. Pt has been stuck X7 times via three different RN.  and provider aware.       Sierra Moctezuma RN  05/23/23 2495

## 2023-05-23 NOTE — TELEPHONE ENCOUNTER
Patient called with complaints of abdominal pain and dark red blood in her stool. The abdominal pain has been going on for about a week.  [phone]

## 2023-05-23 NOTE — TELEPHONE ENCOUNTER
Patient called back with more concern about the abdominal pain. She states she is going to go to the ER.

## 2023-05-23 NOTE — ED PROVIDER NOTES
COLONOSCOPY N/A 4/10/2023    COLONOSCOPY performed by Richa Mishra MD at [facility]  2004    CSF SHUNT      replaced at age 17    CYSTOSCOPY      stone removal    HEMORRHOID SURGERY      HERNIA REPAIR Bilateral 1988    inguinal    HERNIA REPAIR  2015    hiatal hernia    HYSTERECTOMY, TOTAL ABDOMINAL (CERVIX REMOVED)  11/09/2016    TAHBSO, adhesions/PCOS    KNEE ARTHROSCOPY Left 1/7/2015    medial meniscectomy, chondroplasty, plica resection    LITHOTRIPSY Bilateral 12/10/2019    OTHER SURGICAL HISTORY  10/19/2016    op lap    UPPER GASTROINTESTINAL ENDOSCOPY N/A 4/10/2023    EGD BIOPSY performed by Richa Mishra MD at . [address] N/A 9/14/2022    LAPAROSCOPIC LYSIS OF ADHESIONS AND ABDOMINAL WALL MASS REMOVAL performed by Bree Samuels MD at [place] [address]      multiple revisions, most recent 2015       CURRENT MEDICATIONS     [unfilled]    ALLERGIES     Bee venom, Bentyl [dicyclomine hcl], Dicyclomine, Ketorolac tromethamine, Levofloxacin, Maitake, Shiitake mushroom, Sulfa antibiotics, Vancomycin, Zosyn [piperacillin sod-tazobactam so], Adhesive tape, Sulfacetamide, Zofran [ondansetron], Acetaminophen, Butalbital-apap-caff-cod, Ceftaroline, Daptomycin, Haldol [haloperidol], Keppra [levetiracetam], Ketamine, Methocarbamol, Morphine, Nitrofurantoin, Prochlorperazine, Reglan [metoclopramide], Silicone, and Tazobactam    FAMILY HISTORY           Problem Relation Age of Onset    High Blood Pressure Mother     Diabetes Mother     High Cholesterol Mother     Depression Mother     Diabetes Maternal Grandmother     High Blood Pressure Maternal Grandmother     High Cholesterol Maternal Grandmother     Heart Disease Maternal Grandfather     High Blood Pressure Maternal Grandfather     Heart Disease Paternal Grandmother     Stroke Paternal Grandfather     Depression Sister     Cirrhosis Father     Rheum Arthritis Neg Hx     Osteoarthritis Neg

## 2023-05-23 NOTE — ED NOTES
This RN unsuccessful at IV attempting; lead JOHNIE RN made aware to attempt     Cholo Hardwick, SHANA  05/23/23 9317

## 2023-05-23 NOTE — ED TRIAGE NOTES
Patient reports abdominal pain onset 1 week, worse last night. Rectal bleeding last night. Hx of diverticulosis.

## 2023-05-24 ENCOUNTER — APPOINTMENT (OUTPATIENT)
Dept: CT IMAGING | Age: 43
End: 2023-05-24
Payer: COMMERCIAL

## 2023-05-24 ENCOUNTER — HOSPITAL ENCOUNTER (EMERGENCY)
Age: 43
Discharge: HOME | End: 2023-05-24
Payer: COMMERCIAL

## 2023-05-24 ENCOUNTER — TELEPHONE (OUTPATIENT)
Dept: PRIMARY CARE CLINIC | Age: 43
End: 2023-05-24

## 2023-05-24 VITALS
BODY MASS INDEX: 31.64 KG/M2 | DIASTOLIC BLOOD PRESSURE: 88 MMHG | TEMPERATURE: 98.4 F | HEART RATE: 83 BPM | WEIGHT: 156.97 LBS | RESPIRATION RATE: 13 BRPM | HEIGHT: 59 IN | OXYGEN SATURATION: 100 % | SYSTOLIC BLOOD PRESSURE: 115 MMHG

## 2023-05-24 DIAGNOSIS — R10.32 LEFT LOWER QUADRANT ABDOMINAL PAIN: Primary | ICD-10-CM

## 2023-05-24 LAB
ABO + RH BLD: NORMAL
ANION GAP SERPL CALCULATED.3IONS-SCNC: 10 MMOL/L (ref 3–16)
BASOPHILS # BLD: 0.1 K/UL (ref 0–0.2)
BASOPHILS NFR BLD: 1.1 %
BLD GP AB SCN SERPL QL: NORMAL
BUN SERPL-MCNC: 11 MG/DL (ref 7–20)
CALCIUM SERPL-MCNC: 8.8 MG/DL (ref 8.3–10.6)
CHLORIDE SERPL-SCNC: 102 MMOL/L (ref 99–110)
CO2 SERPL-SCNC: 25 MMOL/L (ref 21–32)
CREAT SERPL-MCNC: 0.8 MG/DL (ref 0.6–1.1)
DEPRECATED RDW RBC AUTO: 14.4 % (ref 12.4–15.4)
EOSINOPHIL # BLD: 0.2 K/UL (ref 0–0.6)
EOSINOPHIL NFR BLD: 3.2 %
GFR SERPLBLD CREATININE-BSD FMLA CKD-EPI: >60 ML/MIN/{1.73_M2}
GLUCOSE SERPL-MCNC: 109 MG/DL (ref 70–99)
HCT VFR BLD AUTO: 38.6 % (ref 36–48)
HEMOCCULT STL QL: NORMAL
HGB BLD-MCNC: 13 G/DL (ref 12–16)
INR PPP: 0.98 (ref 0.84–1.16)
LYMPHOCYTES # BLD: 1.9 K/UL (ref 1–5.1)
LYMPHOCYTES NFR BLD: 25 %
MCH RBC QN AUTO: 29.4 PG (ref 26–34)
MCHC RBC AUTO-ENTMCNC: 33.8 G/DL (ref 31–36)
MCV RBC AUTO: 87 FL (ref 80–100)
MONOCYTES # BLD: 0.5 K/UL (ref 0–1.3)
MONOCYTES NFR BLD: 5.9 %
NEUTROPHILS # BLD: 5 K/UL (ref 1.7–7.7)
NEUTROPHILS NFR BLD: 64.8 %
PLATELET # BLD AUTO: 292 K/UL (ref 135–450)
PMV BLD AUTO: 7.9 FL (ref 5–10.5)
POTASSIUM SERPL-SCNC: 3.6 MMOL/L (ref 3.5–5.1)
PROTHROMBIN TIME: 13 SEC (ref 11.5–14.8)
RBC # BLD AUTO: 4.43 M/UL (ref 4–5.2)
SODIUM SERPL-SCNC: 137 MMOL/L (ref 136–145)
WBC # BLD AUTO: 7.8 K/UL (ref 4–11)

## 2023-05-24 PROCEDURE — 86901 BLOOD TYPING SEROLOGIC RH(D): CPT

## 2023-05-24 PROCEDURE — 80048 BASIC METABOLIC PNL TOTAL CA: CPT

## 2023-05-24 PROCEDURE — 86900 BLOOD TYPING SEROLOGIC ABO: CPT

## 2023-05-24 PROCEDURE — 36556 INSERT NON-TUNNEL CV CATH: CPT

## 2023-05-24 PROCEDURE — 6360000004 HC RX CONTRAST MEDICATION: Performed by: NURSE PRACTITIONER

## 2023-05-24 PROCEDURE — 86850 RBC ANTIBODY SCREEN: CPT

## 2023-05-24 PROCEDURE — 99285 EMERGENCY DEPT VISIT HI MDM: CPT

## 2023-05-24 PROCEDURE — 85610 PROTHROMBIN TIME: CPT

## 2023-05-24 PROCEDURE — 74177 CT ABD & PELVIS W/CONTRAST: CPT

## 2023-05-24 PROCEDURE — 85025 COMPLETE CBC W/AUTO DIFF WBC: CPT

## 2023-05-24 PROCEDURE — 82270 OCCULT BLOOD FECES: CPT

## 2023-05-24 RX ORDER — PROMETHAZINE HYDROCHLORIDE 25 MG/1
25 SUPPOSITORY RECTAL EVERY 6 HOURS PRN
Qty: 7 SUPPOSITORY | Refills: 0 | Status: SHIPPED | OUTPATIENT
Start: 2023-05-24 | End: 2023-05-31

## 2023-05-24 RX ADMIN — IOPAMIDOL 75 ML: 755 INJECTION, SOLUTION INTRAVENOUS at 18:02

## 2023-05-24 ASSESSMENT — ENCOUNTER SYMPTOMS
ABDOMINAL PAIN: 1
NAUSEA: 1

## 2023-05-24 ASSESSMENT — PAIN DESCRIPTION - LOCATION: LOCATION: ABDOMEN

## 2023-05-24 ASSESSMENT — PAIN DESCRIPTION - ORIENTATION: ORIENTATION: LEFT;LOWER

## 2023-05-24 ASSESSMENT — PAIN - FUNCTIONAL ASSESSMENT: PAIN_FUNCTIONAL_ASSESSMENT: 0-10

## 2023-05-24 ASSESSMENT — PAIN SCALES - GENERAL: PAINLEVEL_OUTOF10: 9

## 2023-05-24 ASSESSMENT — LIFESTYLE VARIABLES: HOW OFTEN DO YOU HAVE A DRINK CONTAINING ALCOHOL: NEVER

## 2023-05-24 NOTE — ED NOTES
Discharge and education instructions reviewed. Patient verbalized understanding, teach-back successful. Patient denied questions at this time. No acute distress noted. Patient instructed to follow-up as noted - return to emergency department if symptoms worsen. Patient verbalized understanding. Discharged per EDMD with discharge instructions.         Shelly Pickering RN  05/24/23 5817

## 2023-05-24 NOTE — TELEPHONE ENCOUNTER
Spoke with pt and she said that she is having abdominal pain and issues with her bowl movements. She contacted her GI doctor but has not hear back from them. She knows its not her hemroirds. Stated that its just coming out, [facility] like, Just dripping out, some blood on underware. I advised if she is experiencing blood just dripping out and not even trying to have a bm and blood is coming out that she needs to go to the ER.     Made NP Sacha valente

## 2023-05-24 NOTE — ED TRIAGE NOTES
Pt states that she has been experiencing lower abdominal pain, nausea, and vomiting for the past 10 days. Pt states that she was here yesterday and was diagnosed with diverticulitis. Pt states states that she had a bowel movement yesterday that was straight dark blood with clots. Pt states she has been having chills with no fever. Pt denies taking blood thinners.

## 2023-05-24 NOTE — ED NOTES
Discharge and education instructions reviewed. Patient verbalized understanding, teach-back successful. Patient denied questions at this time. No acute distress noted. Patient instructed to follow-up as noted - return to emergency department if symptoms worsen. Patient verbalized understanding. Discharged per EDMD with discharge instructions.         Florencia Cushing, RN  05/23/23 3045

## 2023-05-24 NOTE — ED NOTES
Iv obtained after X14 sticks. IV then blew in CT scan. APRN aware. Test parameters adjusted.       Nilay Mayer RN  05/23/23 2019

## 2023-05-24 NOTE — TELEPHONE ENCOUNTER
Patient called in stating that she feels like she needs to have a bowel movement, but when she uses the restroom she is having dark red blood with no stool. The blood clots \"look like grape jelly\". Patient states that she has bad pains on her left side.  There is swelling in the stomach area under her naval.     Patient was seen in the ER yesterday, but she was released with no treatment plan and no antibiotics     Patient states that she does not want to go back to the ER due to the treatment she received the past times she was seen at [facility] ER    Patient would like to know what Dr. Grabiel Hernández recommends she do about her symptoms    Please contact the patient at [phone]

## 2023-05-24 NOTE — ED PROVIDER NOTES
[address] [place] [address] EMERGENCY DEPARTMENT  [address] [address]  Dept: [phone]  Loc: [phone]  eMERGENCYdEPARTMENT eNCOUnter      Pt Name: Jeb Shirley  MRN: [id number]  [place] 1980  Date of evaluation: 5/24/2023  Provider:FREDRICK Aquino CNP    Independently seen and evaluated by the advanced practice provider    CHIEF COMPLAINT       Chief Complaint   Patient presents with    Abdominal Pain     Pt states that she has been experiencing lower abdominal pain, nausea, and vomiting for the past 10 days. Pt states that she was here yesterday and was diagnosed with diverticulitis. Pt states states that she had a bowel movement yesterday that was straight dark blood with clots. Pt states she has been having chills with no fever. Pt denies taking blood thinners. CRITICAL CARE TIME   T    HISTORY OF PRESENT ILLNESS  (Location/Symptom, Timing/Onset, Context/Setting, Quality, Duration,Modifying Factors, Severity.)   Jeb Shirley is a 38 y.o. female who presents to the emergency department   PMHx: Very extensive medical history, reference below, very extensive surgical history, reference below     Lives at home  Unemployed  Anticoagulation therapy: None  CODE STATUS: Full  Social determinants: None identified    Patient presenting today via [address] [place] [address]  reporting that she was diagnosed with diverticulitis yesterday. This is not true. I took care of her yesterday and thoroughly reviewed her CAT scan results verbatim with her and the CAT scan yesterday indicated diverticulosis. She is returning to the emergency department today reporting that she had bright red rectal bleeding, all blood. She spoke with all 3 of her physicians and they all directed her back to the emergency department again today. I took care of this patient yesterday in the emergency department.     Electronic record review:  Recent visits: May 21, 2023 ED visit

## 2023-05-24 NOTE — TELEPHONE ENCOUNTER
Spoke to patient, If she is having severe abdominal pain, she needs to seek out an opinion from her GI physician, her PCP, or go back to the ER to be evaluated. She verbalized understanding and will call the office if she needs anything else.

## 2023-05-24 NOTE — DISCHARGE INSTRUCTIONS
Hemoccult result negative for blood, the CBC 2 days in a row is showing no evidence of infection or drop in your hemoglobin and hematocrit level. Your metabolic panel is also unremarkable. Your CT abdomen and pelvis with contrast today is identified as being normal with no abnormality.

## 2023-05-29 ENCOUNTER — HOSPITAL ENCOUNTER (EMERGENCY)
Age: 43
Discharge: HOME | End: 2023-05-29
Attending: EMERGENCY MEDICINE
Payer: COMMERCIAL

## 2023-05-29 VITALS
BODY MASS INDEX: 31.64 KG/M2 | SYSTOLIC BLOOD PRESSURE: 128 MMHG | TEMPERATURE: 97.9 F | HEART RATE: 84 BPM | WEIGHT: 156.97 LBS | RESPIRATION RATE: 17 BRPM | DIASTOLIC BLOOD PRESSURE: 87 MMHG | OXYGEN SATURATION: 100 % | HEIGHT: 59 IN

## 2023-05-29 DIAGNOSIS — S30.1XXA GROIN HEMATOMA, INITIAL ENCOUNTER: Primary | ICD-10-CM

## 2023-05-29 LAB
ANION GAP SERPL CALCULATED.3IONS-SCNC: 15 MMOL/L (ref 3–16)
BASOPHILS # BLD: 0.1 K/UL (ref 0–0.2)
BASOPHILS NFR BLD: 1.1 %
BUN SERPL-MCNC: 8 MG/DL (ref 7–20)
CALCIUM SERPL-MCNC: 9.2 MG/DL (ref 8.3–10.6)
CHLORIDE SERPL-SCNC: 106 MMOL/L (ref 99–110)
CO2 SERPL-SCNC: 18 MMOL/L (ref 21–32)
CREAT SERPL-MCNC: 0.7 MG/DL (ref 0.6–1.1)
D DIMER: 0.36 UG/ML FEU (ref 0–0.6)
DEPRECATED RDW RBC AUTO: 14.7 % (ref 12.4–15.4)
EOSINOPHIL # BLD: 0.2 K/UL (ref 0–0.6)
EOSINOPHIL NFR BLD: 1.5 %
GFR SERPLBLD CREATININE-BSD FMLA CKD-EPI: >60 ML/MIN/{1.73_M2}
GLUCOSE SERPL-MCNC: 103 MG/DL (ref 70–99)
HCT VFR BLD AUTO: 46.9 % (ref 36–48)
HGB BLD-MCNC: 15.7 G/DL (ref 12–16)
INR PPP: 0.89 (ref 0.84–1.16)
LYMPHOCYTES # BLD: 2.6 K/UL (ref 1–5.1)
LYMPHOCYTES NFR BLD: 23.7 %
MCH RBC QN AUTO: 29.9 PG (ref 26–34)
MCHC RBC AUTO-ENTMCNC: 33.6 G/DL (ref 31–36)
MCV RBC AUTO: 89.1 FL (ref 80–100)
MONOCYTES # BLD: 0.6 K/UL (ref 0–1.3)
MONOCYTES NFR BLD: 5.1 %
NEUTROPHILS # BLD: 7.4 K/UL (ref 1.7–7.7)
NEUTROPHILS NFR BLD: 68.6 %
PLATELET # BLD AUTO: 220 K/UL (ref 135–450)
PMV BLD AUTO: 8.9 FL (ref 5–10.5)
POTASSIUM SERPL-SCNC: 4.2 MMOL/L (ref 3.5–5.1)
PROTHROMBIN TIME: 12.1 SEC (ref 11.5–14.8)
RBC # BLD AUTO: 5.26 M/UL (ref 4–5.2)
SODIUM SERPL-SCNC: 139 MMOL/L (ref 136–145)
WBC # BLD AUTO: 10.8 K/UL (ref 4–11)

## 2023-05-29 PROCEDURE — 85379 FIBRIN DEGRADATION QUANT: CPT

## 2023-05-29 PROCEDURE — 99283 EMERGENCY DEPT VISIT LOW MDM: CPT

## 2023-05-29 PROCEDURE — 85025 COMPLETE CBC W/AUTO DIFF WBC: CPT

## 2023-05-29 PROCEDURE — 36415 COLL VENOUS BLD VENIPUNCTURE: CPT

## 2023-05-29 PROCEDURE — 85610 PROTHROMBIN TIME: CPT

## 2023-05-29 PROCEDURE — 80048 BASIC METABOLIC PNL TOTAL CA: CPT

## 2023-05-29 ASSESSMENT — PAIN DESCRIPTION - PAIN TYPE
TYPE: ACUTE PAIN
TYPE: ACUTE PAIN

## 2023-05-29 ASSESSMENT — PAIN - FUNCTIONAL ASSESSMENT: PAIN_FUNCTIONAL_ASSESSMENT: 0-10

## 2023-05-29 ASSESSMENT — PAIN DESCRIPTION - DESCRIPTORS
DESCRIPTORS: BURNING
DESCRIPTORS: SHARP;BURNING

## 2023-05-29 ASSESSMENT — PAIN DESCRIPTION - LOCATION
LOCATION: GROIN
LOCATION: GROIN

## 2023-05-29 ASSESSMENT — PAIN DESCRIPTION - ORIENTATION
ORIENTATION: RIGHT
ORIENTATION: RIGHT

## 2023-05-29 ASSESSMENT — PAIN SCALES - GENERAL
PAINLEVEL_OUTOF10: 8
PAINLEVEL_OUTOF10: 7

## 2023-05-29 ASSESSMENT — PAIN DESCRIPTION - FREQUENCY
FREQUENCY: CONTINUOUS
FREQUENCY: CONTINUOUS

## 2023-05-29 NOTE — ED NOTES
ED tech and RN attempted to obtain blood, lab called to come and get blood.       Jess Hsieh, SHANA  05/29/23 2546

## 2023-05-29 NOTE — DISCHARGE INSTRUCTIONS
Call tomorrow morning at 8 AM to schedule an appointment for venous Doppler of the right leg and groin. Contact your primary care physician tomorrow morning at 8 AM to schedule a follow-up appointment to be seen in 1 to 2 days for reexamination. If condition worsens or new symptoms develop, return immediately to the emergency department. Use ice to the affected area. Avoid strenuous lifting or heavy physical activity. May take Tylenol as directed for pain. Avoid medications such as Motrin, aspirin, Aleve, Advil, or ibuprofen.

## 2023-05-29 NOTE — ED PROVIDER NOTES
[address] [facility]      Pt Name: Pushpa Reina  MRN: [id number]  [place] 1980  Date of evaluation: 5/29/2023  Provider: Wilfredo Rhodes, [address] [place] [address]       Chief Complaint   Patient presents with    Groin Pain     Pt states she had a central line on Wednesday and is having pain and swelling area. HISTORY OF PRESENT ILLNESS   (Location/Symptom, Timing/Onset, Context/Setting, Quality, Duration, Modifying Factors, Severity)  Note limiting factors. Pushpa Reina is a 38 y.o. female who presents to the emergency department with complaint of swelling and bruising and discomfort in the right groin area. She states that she was recently admitted to the hospital for rectal bleeding. While she was in the hospital she required placement of a right femoral central line. She reports that this was removed prior to being discharged from the hospital and she developed some bleeding at the site which was controlled with direct pressure. Since discharge from the hospital she has developed some bruising and increased pain at the site. She contacted her physician and was advised to come to the emergency department for further evaluation. She presented to the emergency department on May 23, 2023, 6 days ago with lower abdominal pain, left greater than right as well as bloody diarrhea and passage of some bright red blood clots. She did have a history of internal hemorrhoid banding 1 week prior. She does not take any anticoagulants. CT abdomen and pelvis without contrast revealed no acute findings. She returned to the emergency department on May 24, 2023 with continued rectal bleeding. CT abdomen and pelvis with IV contrast revealed no acute findings. CBC revealed no acute change. No change in hemoglobin.   She did require placement of a triple-lumen catheter in the right groin to facilitate IV contrast.  However, given that work-up was

## 2023-05-30 ENCOUNTER — TELEPHONE (OUTPATIENT)
Dept: PRIMARY CARE CLINIC | Age: 43
End: 2023-05-30

## 2023-05-30 ENCOUNTER — OFFICE VISIT (OUTPATIENT)
Dept: GYNECOLOGY | Age: 43
End: 2023-05-30
Payer: COMMERCIAL

## 2023-05-30 ENCOUNTER — HOSPITAL ENCOUNTER (EMERGENCY)
Age: 43
Discharge: LEFT BEFORE DISCHARGE | End: 2023-05-30

## 2023-05-30 VITALS — SYSTOLIC BLOOD PRESSURE: 138 MMHG | WEIGHT: 153.8 LBS | DIASTOLIC BLOOD PRESSURE: 84 MMHG | BODY MASS INDEX: 31.06 KG/M2

## 2023-05-30 DIAGNOSIS — R87.622 LGSIL PAP SMEAR OF VAGINA: Primary | ICD-10-CM

## 2023-05-30 PROCEDURE — 3075F SYST BP GE 130 - 139MM HG: CPT | Performed by: OBSTETRICS & GYNECOLOGY

## 2023-05-30 PROCEDURE — 3079F DIAST BP 80-89 MM HG: CPT | Performed by: OBSTETRICS & GYNECOLOGY

## 2023-05-30 PROCEDURE — 4004F PT TOBACCO SCREEN RCVD TLK: CPT | Performed by: OBSTETRICS & GYNECOLOGY

## 2023-05-30 PROCEDURE — G8417 CALC BMI ABV UP PARAM F/U: HCPCS | Performed by: OBSTETRICS & GYNECOLOGY

## 2023-05-30 PROCEDURE — 99213 OFFICE O/P EST LOW 20 MIN: CPT | Performed by: OBSTETRICS & GYNECOLOGY

## 2023-05-30 PROCEDURE — G8427 DOCREV CUR MEDS BY ELIG CLIN: HCPCS | Performed by: OBSTETRICS & GYNECOLOGY

## 2023-05-30 NOTE — PROGRESS NOTES
Patient stated she fainted in downstairs bathroom after appointment. She came back up to our office to let us know and stated she felt nauseous, lightheaded, and like she was going to faint again. I checked her O2 and HR, oxygen was 96 and HR was 120.  EMS came and transported patient to [facility].

## 2023-05-30 NOTE — TELEPHONE ENCOUNTER
LATE ENTRY:    Patient called on call provider on 5/29/2023 at 1157 am with the following concern:    Patient states that she was seen in the ER \"About a week ago\" when they placed a \"central line\" in her right groin. She states that she is having severe pain that is going down her right leg, some bruising, and her entire leg is swollen when compared to the left leg. Spoke to the patient - She informed me that when her line was pulled, she went to get out of bed and her leg felt cold and there was blood running down her leg. She then told me that there was blood shooting across the room. Reviewed the chart, and the catheter that was placed was a Venous catheter, which would not be pulsatile. There is notation from [facility] on 5/24 with thorough documentation regarding the incident. Regardless, patient states that she has a large amount of swelling in the area, and the shooting pain down her leg - Advised the patient that there was nothing I could do over the phone. Asked her if there was something that resembled a golf ball under the skin on her groin. She told me it was very swollen, bruised, and painful. Discussed that if there is a hematoma then she should be evaluated at the ER for an evacuation. Patient states that she will go to the ER to be evaluated. Important to note, that patient frequently calls the on-call provider for advice and does not follow up in the office regularly. In fact, she has not been seen in the office since 6/10/2022. The on-call provider cannot perform an assessment or order treatment over the phone, and is only able to advise to follow up in the office or to seek emergency treatment.      Electronically signed by FREDRICK Liz CNP on 5/30/2023 at 10:07 AM

## 2023-05-30 NOTE — ED NOTES
Pt arrived to the ED was placed in the lobby, and pt refused a medical examine and refused all treatment, pt then left.       Sania Carolina RN  05/30/23 2419

## 2023-05-30 NOTE — PROGRESS NOTES
Subjective:      Patient ID: Jorge Ceballos is a 38 y.o. female. HPI  pts here for repeat pap. H/o LGSIL. Seen in the ER for hematoma in groin where pt had central line placed. Pt also had banding of internal hemorrhoids. Review of Systems Pertinent review of systems items discussed above. All others systems items not discussed above were negative. Objective:   Physical Exam    Af, vss  Ext- no lesions  Bladder- good support  Meatus- no lesions  Vag- no d/c, good support  Cx- absent  Eswab    Assessment:   H/o abnormal pap     Plan:   Call with results. Refer to [facility] for new pcp since pt's current pcp Merlinda Gentile left. F/u pap in 4 months.        Romeo Downey MD

## 2023-05-31 ENCOUNTER — HOSPITAL ENCOUNTER (OUTPATIENT)
Dept: VASCULAR LAB | Age: 43
Discharge: HOME | End: 2023-05-31
Attending: EMERGENCY MEDICINE
Payer: COMMERCIAL

## 2023-05-31 DIAGNOSIS — S30.1XXA GROIN HEMATOMA, INITIAL ENCOUNTER: ICD-10-CM

## 2023-05-31 PROCEDURE — 93971 EXTREMITY STUDY: CPT

## 2023-06-19 ENCOUNTER — TELEPHONE (OUTPATIENT)
Dept: PRIMARY CARE CLINIC | Age: 43
End: 2023-06-19

## 2023-06-19 NOTE — TELEPHONE ENCOUNTER
On Call provider (late entry from 6/11/23). Patient called stating that she has a history of a  shunt and that the left side of her face is swollen and that her eye is \"swollen shut. \" States that she has \"pressure\" in her head and face. I called her back to discuss it in more detail. She told me that she has had a headache for a few days with worsening nausea. Denied any dizziness. She told me that she has an upcoming procedure scheduled in July at [facility]-- sounds like in relation to her shunt? I advised her to have someone take her to the closest emergency room. If someone is unable to take her, she should call 911. She told me that she was currently in [place], [place] visiting a family member.

## 2023-07-25 NOTE — H&P
ovary cyst x 2 yrs. Headache(784.0)     migraines    History of blood transfusion     at birth    History of kidney stones     History of PCOS     had hysterectomy in 2016    Hydrocephalus ([address]     Hyperlipidemia     Irritable bowel syndrome 2004    had colonoscopy about 6 yrs ago    Meningitis     Neutrophilic leukocytosis     Nicotine dependence     PONV (postoperative nausea and vomiting)     very nauseated and sometimes wakes up with a Migraine--happened once after brain surgery    Primary osteoarthritis of left knee 2016    S/P cone biopsy of cervix     Scoliosis .s    Seizures ([address]     twice--last one in 2022     (ventriculoperitoneal) shunt status     hydrocephalus f/w Neurosurgeon at [facility]    Wears glasses     reading       Past Surgical History:        Procedure Laterality Date    ABDOMEN SURGERY N/A 2021    REMOVAL OF ABDOMINAL WALL MASS performed by Barbie Wright MD at [facility]      appendicitis     SECTION      placenta previa    CHOLECYSTECTOMY      COLONOSCOPY      COLPOSCOPY      CSF SHUNT      replaced at age 17    CYSTOSCOPY      stone removal    HEMORRHOID SURGERY      HERNIA REPAIR Bilateral     inguinal    HERNIA REPAIR      hiatal hernia    HYSTERECTOMY, TOTAL ABDOMINAL (CERVIX REMOVED)  2016    TAHBSO, adhesions/PCOS    KNEE ARTHROSCOPY Left 2015    medial meniscectomy, chondroplasty, plica resection    LITHOTRIPSY Bilateral 12/10/2019    OTHER SURGICAL HISTORY  10/19/2016    op lap    VENTRAL HERNIA REPAIR N/A 2022    LAPAROSCOPIC LYSIS OF ADHESIONS AND ABDOMINAL WALL MASS REMOVAL performed by Barbie Wright MD at [place] [address]      multiple revisions, most recent        Medications Prior to Admission:    Prior to Admission medications    Medication Sig Start Date End Date Taking?  Authorizing Provider   lisdexamfetamine (VYVANSE) 50 MG capsule Take 50 mg by mouth every morning. Yes Historical Provider, MD   oxyCODONE (ROXICODONE) 5 MG immediate release tablet Take 5 mg by mouth every 6 hours as needed for Pain. Yes Historical Provider, MD   hydrOXYzine HCl (ATARAX) 25 MG tablet Take 25 mg by mouth 3 times daily as needed for Itching   Yes Historical Provider, MD   albuterol sulfate HFA (VENTOLIN HFA) 108 (90 Base) MCG/ACT inhaler Inhale 2 puffs into the lungs 4 times daily as needed for Wheezing  Patient not taking: Reported on 10/9/2022 9/23/22   FREDRICK Nevarez CNP   VYVANSE 50 MG capsule Take 1 capsule by mouth every morning for 30 days. Take 50 mg by mouth every morning. 9/8/22 10/8/22  FREDRICK Nevarez CNP   gabapentin (NEURONTIN) 400 MG capsule Take 400 mg by mouth 3 times daily. Historical Provider, MD       Allergies:  Bee venom, Bentyl [dicyclomine hcl], Dicyclomine, Ketorolac tromethamine, Levofloxacin, Maitake, Shiitake mushroom, Sulfa antibiotics, Vancomycin, Zosyn [piperacillin sod-tazobactam so], Adhesive tape, Oxycodone-acetaminophen, Sulfacetamide, Acetaminophen, Butalbital-apap-caff-cod, Ceftaroline, Daptomycin, Fosfomycin tromethamine, Haldol [haloperidol], Ketamine, Methocarbamol, Morphine, Nitrofurantoin, Prochlorperazine, Reglan [metoclopramide], Silicone, and Tazobactam    Social History:  The patient currently lives home    TOBACCO:   reports that she has been smoking cigarettes. She has a 9.00 pack-year smoking history. She has never used smokeless tobacco.  ETOH:   reports no history of alcohol use. Family History:  Reviewed in detail and negative for DM, Early CAD, Cancer, CVA.  Positive as follows:        Problem Relation Age of Onset    High Blood Pressure Mother     Diabetes Mother     High Cholesterol Mother     Depression Mother     Diabetes Maternal Grandmother     High Blood Pressure Maternal Grandmother     High Cholesterol Maternal Grandmother     Heart Disease Maternal Grandfather     High Blood Pressure Maternal Grandfather     Heart Disease Paternal Grandmother     Stroke Paternal Grandfather     Depression Sister     Cirrhosis Father     Rheum Arthritis Neg Hx     Osteoarthritis Neg Hx     Asthma Neg Hx     Breast Cancer Neg Hx     Cancer Neg Hx     Heart Failure Neg Hx     Hypertension Neg Hx     Migraines Neg Hx     Ovarian Cancer Neg Hx     Rashes/Skin Problems Neg Hx     Seizures Neg Hx     Thyroid Disease Neg Hx        REVIEW OF SYSTEMS:   and as noted in the HPI. All other systems reviewed and negative. PHYSICAL EXAM:    /78   Pulse 97   Temp 99 °F (37.2 °C) (Oral)   Resp 17   Wt 167 lb 8.8 oz (76 kg)   LMP 10/31/2016 (Exact Date)   SpO2 97%   BMI 33.84 kg/m²     General appearance: AAO x4, no acute distress and not on supplemental o2, conversational, still having abd pain  HEENT Normal cephalic, atraumatic without obvious deformity. Pupils equal, round, and reactive to light. Extra ocular muscles intact. Mildly dry MM, anicteric sclera  Neck: Supple, no JVD  Lungs: clear breath sounds bilaterally, no crackles or wheezing  Heart: Regular rate and rhythm, no murmurs  Abdomen: To the lower area of the abdomen at the midline there is an open wound with some cratering that possibly could have some pus in the area but is not draining at this time, there is slight erythema below that wound to the pannus but not extensive but patient is reporting significant pain and tenderness to both areas of the site. Active bowel sounds overall and otherwise overall not distended  Extremities: No rashes  Skin: Wound and skin as noted above in abdomen section  Neurologic: Grossly intact neurological  Mental status: Alert, oriented, thought content appropriate. Capillary Refill: Acceptable  < 3 seconds  Peripheral Pulses: +3 Easily felt, not easily obliterated with pressure      CT abd pelvis w/ iv contrast:  1.  In the anterior pelvic wall an area of skin thickening with ulceration as   well as subcutaneous inflammatory changes. No evidence of abscess. Severe   cellulitis is likely. 2. No evidence of significant hernia. 3. Right nephrolithiasis but no acute obstructive uropathy or pyelonephritis. CXR: no acute process      CBC   Recent Labs     10/09/22  1738   WBC 11.4*   HGB 14.8   HCT 44.9         RENAL  Recent Labs     10/09/22  1738      K 3.9      CO2 24   BUN 14   CREATININE 0.8     LFT'S  Recent Labs     10/09/22  1738   AST 8*   ALT 23   BILITOT 0.3   ALKPHOS 142*     COAG  No results for input(s): INR in the last 72 hours. CARDIAC ENZYMES  No results for input(s): CKTOTAL, CKMB, CKMBINDEX, TROPONINI in the last 72 hours.     U/A:    Lab Results   Component Value Date/Time    NITRITE neg 10/25/2019 12:54 PM    COLORU Yellow 09/25/2022 03:16 PM    WBCUA 8 09/21/2022 10:38 AM    RBCUA 0-2 09/21/2022 10:38 AM    MUCUS 1+ 02/19/2020 12:42 AM    BACTERIA 4+ 09/21/2022 10:38 AM    CLARITYU Clear 09/25/2022 03:16 PM    SPECGRAV 1.007 09/25/2022 03:16 PM    LEUKOCYTESUR Negative 09/25/2022 03:16 PM    BLOODU Negative 09/25/2022 03:16 PM    GLUCOSEU Negative 09/25/2022 03:16 PM    GLUCOSEU NEGATIVE 10/08/2011 10:10 PM    AMORPHOUS Rare 03/21/2017 06:53 PM       ABG  No results found for: YPY1QLZ, BEART, D9KNISTE, PHART, THGBART, BPH4DXV, PO2ART, QNR7EPN        Active Hospital Problems    Diagnosis Date Noted    Abdominal wall cellulitis [P14.723] 10/09/2022     Priority: Medium    DM2 (diabetes mellitus, type 2) ([facility] [E11.9] 10/09/2022     Priority: Medium    HTN (hypertension) [I10] 10/09/2022     Priority: Medium    Cellulitis [L03.90] 10/09/2022     Priority: Medium         PHYSICIANS CERTIFICATION:    I certify that King Arianne is expected to be hospitalized for greater than 2 midnights based on the following assessment and plan:      ASSESSMENT/PLAN:  Abdominal wall cellulitis  DM2  HTN    Plan:  Multiple allergies, noted cellulitis on CT scan but no abscess, noted previous fevers and worsening pain but labs and vitals reassuring here  Started on cefepime and zyvox  Continue IV fluid hydration w/ NS at 100/hr x10 hrs  Consulting general surgery for post surgical site infection  Infectious disease for cellulitis to surgical site  Dilaudid as needed on IV for abdominal pain  Sliding-scale insulin Accu-Cheks  Repeat labs daily    DVT Prophylaxis: Lovenox  Diet: No diet orders on file  Code Status: Prior  PT/OT Eval Status: Ambulatory    Dispo -pending clinical course       Jw Puga DO    Thank you FREDRICK Rodriguez - AARON for the opportunity to be involved in this patient's care. If you have any questions or concerns please feel free to contact me at [phone]. none

## 2023-08-24 NOTE — ED PROVIDER NOTES
- On no home meds  - Hydral PRN   [address] [facility]      Pt Name: Vincent Drummond  VXO:[id number]  [place] 1980  Date of evaluation: 8/19/2022  Provider: Xuan Mcbride PA-C    Patient was seen evaluated by attending physician Dr. Froylan Sahu MD      Chief Complaint:    Chief Complaint   Patient presents with    Flank Pain     Right sided radiates to abdomen, started 4-5 days ago, with decreased urine output          Nursing Notes, Past Medical Hx, Past Surgical Hx, Social Hx, Allergies, and Family Hx were all reviewed and agreed with or any disagreements were addressed in the HPI.    HPI: (Location, Duration, Timing, Severity, Quality, Assoc Sx, Context, Modifying factors)    Chief Complaint of complaint of right-sided flank pain. Also states that when she tried to use the restroom she only get a few dribbles and she is not completely emptying her bladder. This is been going on for the last few days. Complain of abdominal fullness feeling. Denies fever. She does have a history of kidney stones. No chest pain, no lightheaded or dizziness. No extremity pain or weakness. No other complaints. This is a  46 y.o. female who presents to the emergency room with the above complaint. PastMedical/Surgical History:      Diagnosis Date    ADHD (attention deficit hyperactivity disorder) 1988    Depression with anxiety     Diabetes mellitus ([facility]     pre-diabetes    Difficult intubation     Encounter for imaging to screen for metal prior to MRI 06/01/2021    MRI Conditional Medtronic Non-Programmable shunt model#[id number] implanted 10/30/2020 at [facility]. Normal Mode. 1.5T or 3.0T. ESBL (extended spectrum beta-lactamase) producing bacteria infection 11/06/2019    urine    Functional ovarian cysts 2008    rt ovary cyst x 2 yrs.     Headache(784.0)     migraines    History of blood transfusion     at birth    History of kidney stones     History of PCOS Hydrocephalus ([address]     Hyperlipidemia     Irritable bowel syndrome 2004    had colonoscopy about 6 yrs ago    Meningitis     Neutrophilic leukocytosis     Nicotine dependence     PONV (postoperative nausea and vomiting)     very nauseated and sometimes wakes up with a Migraine--happened once after brain surgery    Primary osteoarthritis of left knee 2016    S/P cone biopsy of cervix 2004    Scoliosis 1990.s    Seizures ([address]      (ventriculoperitoneal) shunt status     hydrocephalus f/w Neurosurgeon at [facility]     (ventriculoperitoneal) shunt status     Wears glasses     reading         Procedure Laterality Date    ABDOMEN SURGERY N/A 2021    REMOVAL OF ABDOMINAL WALL MASS performed by Jaret Jones MD at [facility]      appendicitis     SECTION      placenta previa    CHOLECYSTECTOMY      COLONOSCOPY      COLPOSCOPY      CSF SHUNT      replaced at age 17    CYSTOSCOPY      stone removal    HEMORRHOID SURGERY      HERNIA REPAIR Bilateral     inguinal    HERNIA REPAIR      hiatal hernia    HYSTERECTOMY, TOTAL ABDOMINAL (CERVIX REMOVED)  2016    TAHBSO, adhesions/PCOS    KNEE ARTHROSCOPY Left 2015    medial meniscectomy, chondroplasty, plica resection    LITHOTRIPSY Bilateral 12/10/2019    OTHER SURGICAL HISTORY  10/19/2016    op lap    VENTRICULOPERITONEAL SHUNT      multiple revisions, most recent        Medications:  Previous Medications    GABAPENTIN (NEURONTIN) 100 MG CAPSULE    PLEASE SEE ATTACHED FOR DETAILED DIRECTIONS    OXYCODONE ER PO        SIMVASTATIN (ZOCOR) 10 MG TABLET    Take 1 tablet by mouth nightly    VYVANSE 50 MG CAPSULE    Take 1 capsule by mouth every morning for 30 days. Take 50 mg by mouth every morning. Review of Systems:  (2-9 systems needed)  Review of Systems   Constitutional:  Negative for chills and fever. HENT:  Negative for congestion and sore throat.     Eyes:  Negative for pain and visual disturbance. Respiratory:  Negative for cough and shortness of breath. Cardiovascular:  Negative for chest pain and leg swelling. Gastrointestinal:  Positive for abdominal pain. Negative for nausea and vomiting. Genitourinary:  Positive for decreased urine volume, difficulty urinating and flank pain. Negative for dysuria, frequency, vaginal bleeding, vaginal discharge and vaginal pain. Musculoskeletal:  Negative for back pain and neck pain. Skin:  Negative for rash and wound. Neurological:  Negative for dizziness and light-headedness. \"Positives and Pertinent negatives as per HPI\"    Physical Exam:  Physical Exam  Vitals and nursing note reviewed. Constitutional:       Appearance: She is well-developed. She is not diaphoretic. HENT:      Head: Normocephalic and atraumatic. Nose: Nose normal.      Mouth/Throat:      Mouth: Mucous membranes are moist.      Pharynx: Oropharynx is clear. No oropharyngeal exudate or posterior oropharyngeal erythema. Eyes:      General:         Right eye: No discharge. Left eye: No discharge. Extraocular Movements: Extraocular movements intact. Conjunctiva/sclera: Conjunctivae normal.      Pupils: Pupils are equal, round, and reactive to light. Cardiovascular:      Rate and Rhythm: Normal rate and regular rhythm. Heart sounds: Normal heart sounds. No murmur heard. No friction rub. No gallop. Pulmonary:      Effort: Pulmonary effort is normal. No respiratory distress. Breath sounds: Normal breath sounds. No wheezing or rales. Chest:      Chest wall: No tenderness. Abdominal:      General: Abdomen is flat. Bowel sounds are normal. There is no distension. Palpations: Abdomen is soft. There is no mass. Tenderness: abdominal tenderness There is no guarding or rebound. Musculoskeletal:         General: Normal range of motion. Cervical back: Normal range of motion and neck supple.    Skin:     General: Skin is warm and dry. Neurological:      General: No focal deficit present. Mental Status: She is alert and oriented to person, place, and time. Psychiatric:         Behavior: Behavior normal.       MEDICAL DECISION MAKING    Vitals:    Vitals:    08/19/22 1203 08/19/22 1308 08/19/22 1330   BP: (!) 149/87 119/83 108/80   Pulse: (!) 126 97 95   Resp: 18 15    Temp: 98.4 °F (36.9 °C)     TempSrc: Oral     SpO2: 99% 98% 97%   Weight: 158 lb 15.2 oz (72.1 kg)     Height: 4' 11\" (1.499 m)         LABS:  Labs Reviewed   URINALYSIS   PREGNANCY, URINE        Remainder of labs reviewed and were negative at this time or not returned at the time of this note. RADIOLOGY:   Non-plain film images such as CT, Ultrasound and MRI are read by the radiologist. Vianey Coyle PA-C have directly visualized the radiologic plain film image(s) with the below findings:      Interpretation per the Radiologist below, if available at the time of this note:    CT ABDOMEN PELVIS WO CONTRAST Additional Contrast? None   Final Result   1. No evidence of obstructive uropathy. 2. Single punctate nonobstructing calculus in the right kidney noted   incidentally. 3. No acute finding otherwise noted in the abdomen or pelvis. CT ABDOMEN PELVIS WO CONTRAST Additional Contrast? None    Result Date: 8/19/2022  EXAMINATION: CT OF THE ABDOMEN AND PELVIS WITHOUT CONTRAST 8/19/2022 12:15 pm TECHNIQUE: CT of the abdomen and pelvis was performed without the administration of intravenous contrast. Multiplanar reformatted images are provided for review. Automated exposure control, iterative reconstruction, and/or weight based adjustment of the mA/kV was utilized to reduce the radiation dose to as low as reasonably achievable.  COMPARISON: 07/11/2022 CT HISTORY: ORDERING SYSTEM PROVIDED HISTORY: Right flank pain TECHNOLOGIST PROVIDED HISTORY: Additional Contrast?->None Reason for exam:->Right flank pain Decision Support Exception - unselect if not a suspected or confirmed emergency medical condition->Emergency Medical Condition (MA) Is the patient pregnant?->No Reason for Exam: Right flank pain, dysuria Additional signs and symptoms: hyst FINDINGS:  System: There is a single 2 mm nonobstructing calculus in the mid right kidney. No hydronephrosis or hydroureter. No stones in the ureter or bladder. The left kidney and ureter are normal. Lower Chest:  The lung bases are clear. The base of the heart is normal. Organs: The gallbladder is absent. The liver, biliary ducts, pancreas and spleen are normal.  Normal adrenal glands. GI/Bowel: The stomach, duodenum and small bowel are normal.  Minimal diverticular change in the sigmoid colon. No acute inflammation. Pelvis: The bladder is moderately distended. No stones in the lumen. No mucosal abnormality. The uterus is absent. Peritoneum/Retroperitoneum: The aorta tapers normally. No lymph node enlargement. Bones/Soft Tissues: No significant skeletal abnormalities. 1. No evidence of obstructive uropathy. 2. Single punctate nonobstructing calculus in the right kidney noted incidentally. 3. No acute finding otherwise noted in the abdomen or pelvis. CT ABDOMEN PELVIS W IV CONTRAST    Result Date: 7/22/2022  Site: Gl[facility] #: [id number] #: [id number] #: [de-identified] #: [id number] #: [id number]: CT ABDOMEN PELVIS W CONTRASTExam Date/Time: 07/22/2022 09:40 PMAdmitting Diagnosis: Diverticulitis suspectedReason for Exam: Diverticulitis suspected Dictated by: Roscoe Shah CANDE: 07/22/2022 10:30 PMT: This document is confidential medical information. Unauthorized disclosure or use of this information is prohibited by law. If you are not the intended recipient of this document, please advise us by calling immediately [phone].  Impression/Conclusion below 75 HISTORY:   Diverticulitis suspected Abdominal Pain (Left side/ hx diverticulitis/ started last night) COMPARISON:  April 7, 2022 TECHNIQUE: Post IV contrast multiplanar CT images of the abdomen and pelvis NOTE:  If there are questions about the content of this report, please contact [address] [place] [address] radiology by calling [phone] FINDINGS: LOWER CHEST:  Unremarkable LIVER:  Mild intrahepatic ductal dilation demonstrated GALLBLADDER/BILE DUCTS:  Surgically absent PANCREAS:  Unremarkable. No mass or duct dilation SPLEEN:  Unremarkable ADRENALS:  Unremarkable  KIDNEYS/URETERS:  Nonobstructing right renal calcification GI TRACT:  Unremarkable. No obstruction or wall thickening VESSELS:  Unremarkable. No aneurysm or dissection LYMPH NODES: Unremarkable. No enlarged lymph nodes ABD WALL:  Unremarkable PELVIS:  Unremarkable BONES:  Unremarkable OTHER:  Ventriculoperitoneal shunt tubing in the anterior right upper abdomen IMPRESSION: No evidence of acute diverticulitis. No acute inflammatory process identified within the pelvis SIGNED BY: Jennifer Juarez. Sherwin Palencia MD on 7/22/2022 10:27 PM   [facility] [phone] -  [facility] Call Center: [phone] / ED COURSE:      PROCEDURES:   Procedures      Patient was given:  Medications   oxyCODONE-acetaminophen (PERCOCET) 5-325 MG per tablet 1 tablet (1 tablet Oral Given 8/19/22 1231)   ondansetron (ZOFRAN-ODT) disintegrating tablet 4 mg (4 mg Oral Given 8/19/22 1231)     Emergency room course: Patient on exam cardiovascular regular rhythm, lungs are clear. No wheeze rales or rhonchi noted. Abdomen is soft feels slightly distended. She has right flank tenderness with palpation. Full range of motion all extremity alert oriented x4. Does not appear to be in acute distress. Urinalysis show negative leukocytes, negative nitrites, and for ketones, negative for blood. Urine bladder scan shows 480 cc. Patient Guzman drained over 500 cc of urine. CT scan showed no kidney stone. No obstruction. No evidence of UTI. Patient was seen evaluated by my attending Dr. Byron Cole. He was okay with this plan for discharge for home. Discussed patient CT results. And since she cannot urinate and she is only been dribbling a little bit at a time I informed her I will put her in a leg bag however follow-up with urologist.  She is requesting some nausea medicine for home. I will put her on Phenergan. She was okay with this plan. She will be discharged stable condition. The patient tolerated their visit well. I evaluated the patient. The physician was available for consultation as needed. The patient and / or the family were informed of the results of any tests, a time was given to answer questions, a plan was proposed and they agreed with plan. CLINICAL IMPRESSION:  1. Urinary retention    2.  Flank pain        DISPOSITION Decision To Discharge 08/19/2022 02:05:13 PM      PATIENT REFERRED TO:  Tasha Lubin, APRN - CNP  [address] [place] [address]  [phone]    Call   As needed    [facility], [address]  [place] [address]  [phone]    Call in 1 day      DISCHARGE MEDICATIONS:  New Prescriptions    No medications on file       DISCONTINUED MEDICATIONS:  Discontinued Medications    No medications on file              (Please note the MDM and HPI sections of this note were completed with a voice recognition program.  Efforts were made to edit the dictations but occasionally words are mis-transcribed.)    Electronically signed, Cammie Caban PA-C,          Cammie Caban PA-C  08/19/22 [phone]

## 2023-09-06 ENCOUNTER — APPOINTMENT (OUTPATIENT)
Dept: CT IMAGING | Age: 43
End: 2023-09-06

## 2023-09-06 ENCOUNTER — HOSPITAL ENCOUNTER (EMERGENCY)
Age: 43
Discharge: HOME | End: 2023-09-06
Attending: EMERGENCY MEDICINE

## 2023-09-06 VITALS
OXYGEN SATURATION: 96 % | RESPIRATION RATE: 16 BRPM | DIASTOLIC BLOOD PRESSURE: 83 MMHG | HEIGHT: 59 IN | SYSTOLIC BLOOD PRESSURE: 124 MMHG | WEIGHT: 158 LBS | TEMPERATURE: 98.1 F | BODY MASS INDEX: 31.85 KG/M2 | HEART RATE: 86 BPM

## 2023-09-06 DIAGNOSIS — R10.9 LEFT FLANK PAIN: Primary | ICD-10-CM

## 2023-09-06 LAB
ALBUMIN SERPL-MCNC: 4.7 G/DL (ref 3.4–5)
ALBUMIN/GLOB SERPL: 1.5 {RATIO} (ref 1.1–2.2)
ALP SERPL-CCNC: 101 U/L (ref 40–129)
ALT SERPL-CCNC: 11 U/L (ref 10–40)
ANION GAP SERPL CALCULATED.3IONS-SCNC: 14 MMOL/L (ref 3–16)
AST SERPL-CCNC: 13 U/L (ref 15–37)
BACTERIA URNS QL MICRO: ABNORMAL /HPF
BASOPHILS # BLD: 0 K/UL (ref 0–0.2)
BASOPHILS NFR BLD: 0.2 %
BILIRUB SERPL-MCNC: 0.4 MG/DL (ref 0–1)
BILIRUB UR QL STRIP.AUTO: NEGATIVE
BUN SERPL-MCNC: 8 MG/DL (ref 7–20)
CALCIUM SERPL-MCNC: 9.6 MG/DL (ref 8.3–10.6)
CHLORIDE SERPL-SCNC: 105 MMOL/L (ref 99–110)
CLARITY UR: CLEAR
CO2 SERPL-SCNC: 22 MMOL/L (ref 21–32)
COLOR UR: YELLOW
CREAT SERPL-MCNC: 0.6 MG/DL (ref 0.6–1.1)
DEPRECATED RDW RBC AUTO: 14 % (ref 12.4–15.4)
EOSINOPHIL # BLD: 0.1 K/UL (ref 0–0.6)
EOSINOPHIL NFR BLD: 0.4 %
EPI CELLS #/AREA URNS HPF: ABNORMAL /HPF (ref 0–5)
GFR SERPLBLD CREATININE-BSD FMLA CKD-EPI: >60 ML/MIN/{1.73_M2}
GLUCOSE SERPL-MCNC: 122 MG/DL (ref 70–99)
GLUCOSE UR STRIP.AUTO-MCNC: NEGATIVE MG/DL
HCT VFR BLD AUTO: 42.9 % (ref 36–48)
HGB BLD-MCNC: 14.7 G/DL (ref 12–16)
HGB UR QL STRIP.AUTO: ABNORMAL
IMM GRANULOCYTES # BLD: 0 K/UL (ref 0–0.2)
IMM GRANULOCYTES NFR BLD: 0.2 %
KETONES UR STRIP.AUTO-MCNC: NEGATIVE MG/DL
LACTATE BLDV-SCNC: 1.7 MMOL/L (ref 0.4–1.9)
LEUKOCYTE ESTERASE UR QL STRIP.AUTO: NEGATIVE
LIPASE SERPL-CCNC: 14 U/L (ref 13–60)
LYMPHOCYTES # BLD: 2.1 K/UL (ref 1–5.1)
LYMPHOCYTES NFR BLD: 15.1 %
MCH RBC QN AUTO: 28.9 PG (ref 26–34)
MCHC RBC AUTO-ENTMCNC: 34.3 G/DL (ref 32–36.4)
MCV RBC AUTO: 84.4 FL (ref 80–100)
MONOCYTES # BLD: 0.7 K/UL (ref 0–1.3)
MONOCYTES NFR BLD: 4.9 %
NEUTROPHILS # BLD: 11 K/UL (ref 1.7–7.7)
NEUTROPHILS NFR BLD: 79.2 %
NITRITE UR QL STRIP.AUTO: NEGATIVE
PH UR STRIP.AUTO: 6 [PH] (ref 5–8)
PLATELET # BLD AUTO: 305 K/UL (ref 135–450)
PMV BLD AUTO: 9 FL (ref 5–10.5)
POTASSIUM SERPL-SCNC: 3.3 MMOL/L (ref 3.5–5.1)
PROT SERPL-MCNC: 7.9 G/DL (ref 6.4–8.2)
PROT UR STRIP.AUTO-MCNC: NEGATIVE MG/DL
RBC # BLD AUTO: 5.08 M/UL (ref 4–5.2)
RBC #/AREA URNS HPF: ABNORMAL /HPF (ref 0–4)
SODIUM SERPL-SCNC: 141 MMOL/L (ref 136–145)
SP GR UR STRIP.AUTO: 1.01 (ref 1–1.03)
UA COMPLETE W REFLEX CULTURE PNL UR: ABNORMAL
UA DIPSTICK W REFLEX MICRO PNL UR: YES
URN SPEC COLLECT METH UR: ABNORMAL
UROBILINOGEN UR STRIP-ACNC: 0.2 E.U./DL
WBC # BLD AUTO: 13.9 K/UL (ref 4–11)
WBC #/AREA URNS HPF: ABNORMAL /HPF (ref 0–5)

## 2023-09-06 PROCEDURE — 6360000002 HC RX W HCPCS: Performed by: EMERGENCY MEDICINE

## 2023-09-06 PROCEDURE — 80053 COMPREHEN METABOLIC PANEL: CPT

## 2023-09-06 PROCEDURE — 96374 THER/PROPH/DIAG INJ IV PUSH: CPT

## 2023-09-06 PROCEDURE — 96376 TX/PRO/DX INJ SAME DRUG ADON: CPT

## 2023-09-06 PROCEDURE — 83690 ASSAY OF LIPASE: CPT

## 2023-09-06 PROCEDURE — 96375 TX/PRO/DX INJ NEW DRUG ADDON: CPT

## 2023-09-06 PROCEDURE — 6360000004 HC RX CONTRAST MEDICATION: Performed by: EMERGENCY MEDICINE

## 2023-09-06 PROCEDURE — 81001 URINALYSIS AUTO W/SCOPE: CPT

## 2023-09-06 PROCEDURE — 99285 EMERGENCY DEPT VISIT HI MDM: CPT

## 2023-09-06 PROCEDURE — 74177 CT ABD & PELVIS W/CONTRAST: CPT

## 2023-09-06 PROCEDURE — 83605 ASSAY OF LACTIC ACID: CPT

## 2023-09-06 PROCEDURE — 85025 COMPLETE CBC W/AUTO DIFF WBC: CPT

## 2023-09-06 PROCEDURE — 36415 COLL VENOUS BLD VENIPUNCTURE: CPT

## 2023-09-06 RX ORDER — DIPHENHYDRAMINE HYDROCHLORIDE 50 MG/ML
25 INJECTION INTRAMUSCULAR; INTRAVENOUS ONCE
Status: COMPLETED | OUTPATIENT
Start: 2023-09-06 | End: 2023-09-06

## 2023-09-06 RX ORDER — MORPHINE SULFATE 4 MG/ML
4 INJECTION, SOLUTION INTRAMUSCULAR; INTRAVENOUS ONCE
Status: COMPLETED | OUTPATIENT
Start: 2023-09-06 | End: 2023-09-06

## 2023-09-06 RX ORDER — ONDANSETRON 2 MG/ML
4 INJECTION INTRAMUSCULAR; INTRAVENOUS ONCE
Status: COMPLETED | OUTPATIENT
Start: 2023-09-06 | End: 2023-09-06

## 2023-09-06 RX ORDER — METAXALONE 800 MG/1
800 TABLET ORAL 3 TIMES DAILY PRN
Qty: 30 TABLET | Refills: 0 | Status: SHIPPED | OUTPATIENT
Start: 2023-09-06 | End: 2023-09-16

## 2023-09-06 RX ADMIN — DIPHENHYDRAMINE HYDROCHLORIDE 25 MG: 50 INJECTION INTRAMUSCULAR; INTRAVENOUS at 19:08

## 2023-09-06 RX ADMIN — ONDANSETRON 4 MG: 2 INJECTION INTRAMUSCULAR; INTRAVENOUS at 19:08

## 2023-09-06 RX ADMIN — MORPHINE SULFATE 4 MG: 4 INJECTION, SOLUTION INTRAMUSCULAR; INTRAVENOUS at 20:26

## 2023-09-06 RX ADMIN — MORPHINE SULFATE 4 MG: 4 INJECTION, SOLUTION INTRAMUSCULAR; INTRAVENOUS at 19:08

## 2023-09-06 RX ADMIN — IOMEPROL INJECTION 100 ML: 714 INJECTION, SOLUTION INTRAVASCULAR at 19:25

## 2023-09-06 RX ADMIN — ONDANSETRON 4 MG: 2 INJECTION INTRAMUSCULAR; INTRAVENOUS at 20:27

## 2023-09-06 ASSESSMENT — PAIN DESCRIPTION - ORIENTATION
ORIENTATION: LEFT
ORIENTATION: LEFT

## 2023-09-06 ASSESSMENT — PAIN SCALES - GENERAL
PAINLEVEL_OUTOF10: 3
PAINLEVEL_OUTOF10: 8
PAINLEVEL_OUTOF10: 9
PAINLEVEL_OUTOF10: 3
PAINLEVEL_OUTOF10: 7

## 2023-09-06 ASSESSMENT — PAIN DESCRIPTION - DESCRIPTORS: DESCRIPTORS: SHARP

## 2023-09-06 ASSESSMENT — PAIN - FUNCTIONAL ASSESSMENT
PAIN_FUNCTIONAL_ASSESSMENT: 0-10

## 2023-09-06 ASSESSMENT — PAIN DESCRIPTION - LOCATION
LOCATION: FLANK
LOCATION: FLANK
LOCATION: FLANK;ABDOMEN

## 2023-09-06 ASSESSMENT — PAIN DESCRIPTION - PAIN TYPE: TYPE: ACUTE PAIN

## 2023-09-06 ASSESSMENT — PAIN DESCRIPTION - FREQUENCY
FREQUENCY: CONTINUOUS
FREQUENCY: CONTINUOUS

## 2023-09-06 NOTE — ED PROVIDER NOTES
earache rhinorrhea or sore throat. Cardiovascular: No chest pain. Pulmonary: No shortness of breath or cough. GI: Left flank pain radiating into her left lower abdomen. Nausea but no vomiting. No diarrhea. : No frequency urgency or dysuria. Status post hysterectomy. Neuro: She said she is felt little lightheaded at times. No vertigo. Except as noted above the remainder of the review of systems was reviewed and negative. PAST MEDICAL HISTORY     Past Medical History:   Diagnosis Date    ADHD (attention deficit hyperactivity disorder) 1988    Depression with anxiety     Diabetes mellitus ([address])     pre-diabetes    Difficult intubation     airway swelled up    Drug-seeking behavior     Encounter for imaging to screen for metal prior to MRI 06/01/2021    MRI Conditional Medtronic Non-Programmable shunt model#99457 implanted 10/30/2020 at [facility]. Normal Mode. 1.5T or 3.0T. ESBL (extended spectrum beta-lactamase) producing bacteria infection 11/06/2019    urine    Functional ovarian cysts 2008    rt ovary cyst x 2 yrs.     Headache(784.0)     migraines    History of blood transfusion     at birth    History of kidney stones     History of PCOS     had hysterectomy in 2016    Hydrocephalus [facility])     Hyperlipidemia     Irritable bowel syndrome 2004    had colonoscopy about 6 yrs ago    Meningitis 07/5635    Neutrophilic leukocytosis     Nicotine dependence     PONV (postoperative nausea and vomiting)     very nauseated and sometimes wakes up with a Migraine--happened once after brain surgery    Primary osteoarthritis of left knee 07/01/2016    S/P cone biopsy of cervix 2004    Scoliosis 1990.s    Seizures ([address])     twice--last one in June2022     (ventriculoperitoneal) shunt status 1982    hydrocephalus f/w Neurosurgeon at [facility]    Wears glasses     reading         SURGICAL HISTORY       Past Surgical History:   Procedure Laterality Date    ABDOMEN SURGERY N/A 7/14/2021    REMOVAL OF

## 2023-09-06 NOTE — ED NOTES
Pt standing at side of bed, plugging cell phone into outlet. States she is feeling better. Requesting water, encouraged patient to wait until CT results are returned.       Alfornia Phalen, RN  09/06/23 1954

## 2023-09-07 NOTE — DISCHARGE INSTRUCTIONS
Use ice packs or cold compresses to your back every 2-3 hours to help with pain. Skelaxin 3 times daily as prescribed for pain. Tylenol every 6 hours as needed for pain. Follow-up with your primary care provider in 3 to 5 days for continued symptoms. Return as needed for worsening of symptoms or new symptoms of concern.

## 2023-09-07 NOTE — ED NOTES
Pt c/o increased nausea and states her back pain is increasing. MD made aware.       Afshin Tran RN  09/06/23 2014

## 2023-09-07 NOTE — ED NOTES
Pt sitting up in bed, usingcell phone. States her sister is on her way to pick her up. States her pain is better.       Feli Viiera RN  09/06/23 6532

## 2023-09-23 ENCOUNTER — HOSPITAL ENCOUNTER (EMERGENCY)
Age: 43
Discharge: HOME | End: 2023-09-23
Attending: EMERGENCY MEDICINE
Payer: COMMERCIAL

## 2023-09-23 VITALS
HEIGHT: 59 IN | BODY MASS INDEX: 33.64 KG/M2 | SYSTOLIC BLOOD PRESSURE: 145 MMHG | OXYGEN SATURATION: 98 % | DIASTOLIC BLOOD PRESSURE: 82 MMHG | RESPIRATION RATE: 17 BRPM | WEIGHT: 166.89 LBS | HEART RATE: 108 BPM | TEMPERATURE: 97.8 F

## 2023-09-23 DIAGNOSIS — S39.012A STRAIN OF LUMBAR REGION, INITIAL ENCOUNTER: Primary | ICD-10-CM

## 2023-09-23 PROCEDURE — 6370000000 HC RX 637 (ALT 250 FOR IP): Performed by: EMERGENCY MEDICINE

## 2023-09-23 PROCEDURE — 99283 EMERGENCY DEPT VISIT LOW MDM: CPT

## 2023-09-23 RX ORDER — CYCLOBENZAPRINE HCL 10 MG
10 TABLET ORAL ONCE
Status: COMPLETED | OUTPATIENT
Start: 2023-09-23 | End: 2023-09-23

## 2023-09-23 RX ORDER — CYCLOBENZAPRINE HCL 5 MG
5 TABLET ORAL 3 TIMES DAILY PRN
Qty: 30 TABLET | Refills: 0 | Status: SHIPPED | OUTPATIENT
Start: 2023-09-23 | End: 2023-10-03

## 2023-09-23 RX ORDER — PREDNISONE 20 MG/1
40 TABLET ORAL DAILY
Qty: 14 TABLET | Refills: 0 | Status: SHIPPED | OUTPATIENT
Start: 2023-09-23 | End: 2023-09-29

## 2023-09-23 RX ADMIN — CYCLOBENZAPRINE 10 MG: 10 TABLET, FILM COATED ORAL at 13:44

## 2023-09-23 ASSESSMENT — PAIN DESCRIPTION - DESCRIPTORS: DESCRIPTORS: ACHING

## 2023-09-23 ASSESSMENT — PATIENT HEALTH QUESTIONNAIRE - PHQ9
2. FEELING DOWN, DEPRESSED OR HOPELESS: 0
SUM OF ALL RESPONSES TO PHQ QUESTIONS 1-9: 0
SUM OF ALL RESPONSES TO PHQ9 QUESTIONS 1 & 2: 0
SUM OF ALL RESPONSES TO PHQ QUESTIONS 1-9: 0
1. LITTLE INTEREST OR PLEASURE IN DOING THINGS: 0
SUM OF ALL RESPONSES TO PHQ QUESTIONS 1-9: 0
SUM OF ALL RESPONSES TO PHQ QUESTIONS 1-9: 0

## 2023-09-23 ASSESSMENT — PAIN DESCRIPTION - FREQUENCY: FREQUENCY: CONTINUOUS

## 2023-09-23 ASSESSMENT — PAIN - FUNCTIONAL ASSESSMENT
PAIN_FUNCTIONAL_ASSESSMENT: 0-10
PAIN_FUNCTIONAL_ASSESSMENT: ACTIVITIES ARE NOT PREVENTED

## 2023-09-23 ASSESSMENT — PAIN DESCRIPTION - LOCATION: LOCATION: BACK

## 2023-09-23 ASSESSMENT — PAIN SCALES - GENERAL
PAINLEVEL_OUTOF10: 10

## 2023-09-23 ASSESSMENT — PAIN DESCRIPTION - PAIN TYPE: TYPE: ACUTE PAIN

## 2023-09-23 ASSESSMENT — PAIN DESCRIPTION - ORIENTATION: ORIENTATION: MID;LOWER

## 2023-09-23 NOTE — ED PROVIDER NOTES
biopsy of cervix     Scoliosis 1990.s    Seizures ([address])     twice--last one in 2022     (ventriculoperitoneal) shunt status     hydrocephalus f/w Neurosurgeon at [facility]    Wears glasses     reading     Past Surgical History:   Procedure Laterality Date    ABDOMEN SURGERY N/A 2021    REMOVAL OF ABDOMINAL WALL MASS performed by Illona Seip, MD at [address]      appendicitis     SECTION  2005    placenta previa    CHOLECYSTECTOMY      COLONOSCOPY  2004    COLONOSCOPY N/A 4/10/2023    COLONOSCOPY performed by Mara Bond MD at [facility]      CSF SHUNT      replaced at age 17    CYSTOSCOPY      stone removal    HEMORRHOID SURGERY      HERNIA REPAIR Bilateral     inguinal    HERNIA REPAIR  2015    hiatal hernia    HYSTERECTOMY, TOTAL ABDOMINAL (CERVIX REMOVED)  2016    TAHBSO, adhesions/PCOS    KNEE ARTHROSCOPY Left 2015    medial meniscectomy, chondroplasty, plica resection    LITHOTRIPSY Bilateral 12/10/2019    OTHER SURGICAL HISTORY  10/19/2016    op lap    UPPER GASTROINTESTINAL ENDOSCOPY N/A 4/10/2023    EGD BIOPSY performed by Mara Bond MD at [address] N/A 2022    LAPAROSCOPIC LYSIS OF ADHESIONS AND ABDOMINAL WALL MASS REMOVAL performed by Illona Seip, MD at [facility]      multiple revisions, most recent      Family History   Problem Relation Age of Onset    High Blood Pressure Mother     Diabetes Mother     High Cholesterol Mother     Depression Mother     Diabetes Maternal Grandmother     High Blood Pressure Maternal Grandmother     High Cholesterol Maternal Grandmother     Heart Disease Maternal Grandfather     High Blood Pressure Maternal Grandfather     Heart Disease Paternal Grandmother     Stroke Paternal Grandfather     Depression Sister     Cirrhosis Father     Rheum Arthritis Neg Hx     Osteoarthritis Neg Hx     Asthma Neg Hx     Breast strength bilaterally  Normal knee extension bilaterally  Normal ankle and great toe dorsiflexion bilaterally  Normal Patellar 2/4 reflexes bilaterally  Normal plantarflexion of the toes bilaterally       ED COURSE/MDM  Low back pain: No red flag symptoms. No neurologic deficits noted on exam.  No bowel or bladder incontinence or retention. No fevers, weight loss or night sweats. No weakness. No saddle paresthesias. No abdominal pain. No claudication. Bilateral symmetric femoral pulses. Patient will be advised to continue her ibuprofen and will be given prescriptions for cyclobenzaprine. Patient already has lidocaine patches which she will continue to use. She will be given a short course of prednisone given her subjective feelings of numbness and tingling and will be referred to [address] [place] [address] clinic. I do not see any physical exam or historical evidence to suggest epidural abscess or hematoma or cauda equina syndrome. No exam findings to suggest AAA    Hypertension:  Patient was hypertensive during the ER visit today. There are no signs of hypertensive emergency or evidence of end organ damage by history or physical exam.  These findings were discussed with the patient and advised to follow up with primary care physician to further assess and treat hypertension in the outpatient setting. The patient's blood pressure was found to be elevated according to CMS/Medicare and the Affordable Care Act/ObamaCare criteria. Elevated blood pressure could occur because of pain or anxiety or other reasons and does not mean that they need to have their blood pressure treated or medications otherwise adjusted. However, this could also be a sign that they will need to have their blood pressure treated or medications changed. The patient was instructed to take a list of recent blood pressure readings to their next visit with their personal physician. Patient was given scripts for the following medications.  I

## 2023-09-29 ENCOUNTER — OFFICE VISIT (OUTPATIENT)
Dept: INTERNAL MEDICINE CLINIC | Age: 43
End: 2023-09-29
Payer: COMMERCIAL

## 2023-09-29 VITALS
WEIGHT: 169 LBS | OXYGEN SATURATION: 99 % | HEART RATE: 99 BPM | HEIGHT: 59 IN | BODY MASS INDEX: 34.07 KG/M2 | DIASTOLIC BLOOD PRESSURE: 84 MMHG | SYSTOLIC BLOOD PRESSURE: 132 MMHG

## 2023-09-29 DIAGNOSIS — Q03.9 CONGENITAL HYDROCEPHALUS (HCC): ICD-10-CM

## 2023-09-29 DIAGNOSIS — E78.2 MIXED HYPERLIPIDEMIA: ICD-10-CM

## 2023-09-29 DIAGNOSIS — Z12.31 ENCOUNTER FOR SCREENING MAMMOGRAM FOR MALIGNANT NEOPLASM OF BREAST: ICD-10-CM

## 2023-09-29 DIAGNOSIS — G40.909 SEIZURE DISORDER (HCC): ICD-10-CM

## 2023-09-29 DIAGNOSIS — R07.81 RIB PAIN ON RIGHT SIDE: ICD-10-CM

## 2023-09-29 DIAGNOSIS — E11.69 TYPE 2 DIABETES MELLITUS WITH OTHER SPECIFIED COMPLICATION, WITHOUT LONG-TERM CURRENT USE OF INSULIN (HCC): ICD-10-CM

## 2023-09-29 DIAGNOSIS — F90.9 ATTENTION DEFICIT HYPERACTIVITY DISORDER (ADHD), UNSPECIFIED ADHD TYPE: ICD-10-CM

## 2023-09-29 DIAGNOSIS — Z72.0 TOBACCO ABUSE: ICD-10-CM

## 2023-09-29 DIAGNOSIS — M54.16 LUMBAR BACK PAIN WITH RADICULOPATHY AFFECTING LEFT LOWER EXTREMITY: Primary | ICD-10-CM

## 2023-09-29 DIAGNOSIS — M54.6 ACUTE MIDLINE THORACIC BACK PAIN: ICD-10-CM

## 2023-09-29 PROCEDURE — 4004F PT TOBACCO SCREEN RCVD TLK: CPT | Performed by: NURSE PRACTITIONER

## 2023-09-29 PROCEDURE — 3079F DIAST BP 80-89 MM HG: CPT | Performed by: NURSE PRACTITIONER

## 2023-09-29 PROCEDURE — G8417 CALC BMI ABV UP PARAM F/U: HCPCS | Performed by: NURSE PRACTITIONER

## 2023-09-29 PROCEDURE — 99215 OFFICE O/P EST HI 40 MIN: CPT | Performed by: NURSE PRACTITIONER

## 2023-09-29 PROCEDURE — G8427 DOCREV CUR MEDS BY ELIG CLIN: HCPCS | Performed by: NURSE PRACTITIONER

## 2023-09-29 PROCEDURE — 3044F HG A1C LEVEL LT 7.0%: CPT | Performed by: NURSE PRACTITIONER

## 2023-09-29 PROCEDURE — 36415 COLL VENOUS BLD VENIPUNCTURE: CPT | Performed by: NURSE PRACTITIONER

## 2023-09-29 PROCEDURE — 2022F DILAT RTA XM EVC RTNOPTHY: CPT | Performed by: NURSE PRACTITIONER

## 2023-09-29 PROCEDURE — 3075F SYST BP GE 130 - 139MM HG: CPT | Performed by: NURSE PRACTITIONER

## 2023-09-29 RX ORDER — GABAPENTIN 300 MG/1
300 CAPSULE ORAL 2 TIMES DAILY
Qty: 60 CAPSULE | Refills: 0 | Status: SHIPPED | OUTPATIENT
Start: 2023-09-29 | End: 2023-10-29

## 2023-09-29 SDOH — HEALTH STABILITY: PHYSICAL HEALTH: ON AVERAGE, HOW MANY DAYS PER WEEK DO YOU ENGAGE IN MODERATE TO STRENUOUS EXERCISE (LIKE A BRISK WALK)?: 7 DAYS

## 2023-09-29 SDOH — HEALTH STABILITY: PHYSICAL HEALTH: ON AVERAGE, HOW MANY MINUTES DO YOU ENGAGE IN EXERCISE AT THIS LEVEL?: 30 MIN

## 2023-09-29 ASSESSMENT — SOCIAL DETERMINANTS OF HEALTH (SDOH)
WITHIN THE LAST YEAR, HAVE YOU BEEN AFRAID OF YOUR PARTNER OR EX-PARTNER?: NO
WITHIN THE LAST YEAR, HAVE YOU BEEN KICKED, HIT, SLAPPED, OR OTHERWISE PHYSICALLY HURT BY YOUR PARTNER OR EX-PARTNER?: NO
WITHIN THE LAST YEAR, HAVE YOU BEEN HUMILIATED OR EMOTIONALLY ABUSED IN OTHER WAYS BY YOUR PARTNER OR EX-PARTNER?: NO
WITHIN THE LAST YEAR, HAVE TO BEEN RAPED OR FORCED TO HAVE ANY KIND OF SEXUAL ACTIVITY BY YOUR PARTNER OR EX-PARTNER?: NO

## 2023-09-29 ASSESSMENT — ENCOUNTER SYMPTOMS
SHORTNESS OF BREATH: 0
BACK PAIN: 1
COUGH: 1

## 2023-09-29 NOTE — PROGRESS NOTES
Date: 9/29/2023                                               Subjective/Objective:     Chief Complaint   Patient presents with    Establish Care       HPI    Yrn Gatica is a 42 yo female, visit today to establish care. Former patient of [facility] clinic, last seen 6/2022. Was seen at outside ER 9/25/2023 for back pain. She reports symptoms began several months ago. Pain is located mid low back. Rates this as severe. Describes as sharp. Worse with sitting and standing. She does not have shooting pain into either leg. She has numbness in the left leg. Does feel somewhat weak. She was treated with prednisone and muscle relaxer with no improvement. Has tried APAP, NSAIDs with no relief. No changes to bladder habits. Has been having stool incontinence which began a week ago. Would like referral to [place]. Denies known fall/injury. Per review of outside records; she was also complaining of LLE numbness/tingling during admission to OSH 6/2023. MRI L spine 9/25/2023:  DISC LEVELS:   T12-L1:  Unremarkable     L1-2:      Unremarkable     L2-3:      Unremarkable       L3-4:      Mild leftward disc bulging. No significant central foraminal stenosis. L4-5:      Unremarkable     L5-S1:    Unremarkable         LUMBOSACRAL JUNCTION: Unremarkable   SACRUM AND SI JOINTS:  Unremarkable   OTHER:  None     Is having intermittent right rib pain that began last night with coughing. No trouble breathing. Congential hydrocephalus,  shunt placed as infant. S/p removal  shunt with EVD placement 10/2020 and subsequent reinsertion later 10/2020. These surgeries were completed in [facility] and she reports that all local neurosurgeons have told her \"they won't touch her. \"  Seizures - on Lamictal. Last seizure was in June she thinks. Following with neurology. Diabetes - Unsure when diagnosed - has taken metformin, had GI side effects. Denies polys. Eye exam is not current.      Hyperlipidemia on

## 2023-09-29 NOTE — PATIENT INSTRUCTIONS
Mammogram   Schedule with [facility]  Schedule with psychiatry  Xray rib and thoracic spine at [address]

## 2023-10-04 DIAGNOSIS — E11.69 TYPE 2 DIABETES MELLITUS WITH OTHER SPECIFIED COMPLICATION, WITHOUT LONG-TERM CURRENT USE OF INSULIN (HCC): ICD-10-CM

## 2023-10-04 DIAGNOSIS — E78.2 MIXED HYPERLIPIDEMIA: ICD-10-CM

## 2023-10-04 LAB
ALBUMIN SERPL-MCNC: 4.5 G/DL (ref 3.4–5)
ALP SERPL-CCNC: 172 U/L (ref 40–129)
ALT SERPL-CCNC: 122 U/L (ref 10–40)
AST SERPL-CCNC: 48 U/L (ref 15–37)
BILIRUB DIRECT SERPL-MCNC: <0.2 MG/DL (ref 0–0.3)
BILIRUB INDIRECT SERPL-MCNC: ABNORMAL MG/DL (ref 0–1)
BILIRUB SERPL-MCNC: 0.3 MG/DL (ref 0–1)
CHOLEST SERPL-MCNC: 240 MG/DL (ref 0–199)
HDLC SERPL-MCNC: 37 MG/DL (ref 40–60)
LDL CHOLESTEROL CALCULATED: 157 MG/DL
PROT SERPL-MCNC: 7 G/DL (ref 6.4–8.2)
TRIGL SERPL-MCNC: 229 MG/DL (ref 0–150)
VLDLC SERPL CALC-MCNC: 46 MG/DL

## 2023-10-05 ENCOUNTER — TELEPHONE (OUTPATIENT)
Dept: INTERNAL MEDICINE CLINIC | Age: 43
End: 2023-10-05

## 2023-10-05 DIAGNOSIS — R10.11 RUQ PAIN: Primary | ICD-10-CM

## 2023-10-05 DIAGNOSIS — E78.2 MIXED HYPERLIPIDEMIA: Primary | ICD-10-CM

## 2023-10-05 DIAGNOSIS — R79.89 ELEVATED LFTS: ICD-10-CM

## 2023-10-05 LAB
EST. AVERAGE GLUCOSE BLD GHB EST-MCNC: 134.1 MG/DL
HBA1C MFR BLD: 6.3 %

## 2023-10-05 RX ORDER — ATORVASTATIN CALCIUM 10 MG/1
10 TABLET, FILM COATED ORAL NIGHTLY
Qty: 30 TABLET | Refills: 1 | Status: SHIPPED | OUTPATIENT
Start: 2023-10-05

## 2023-10-05 NOTE — TELEPHONE ENCOUNTER
Spoke with patient, she is having upper stomach pain on the right. Started vomiting yesterday, feel like belly is always full. She is fatigued, slept all day yesterday.      Please advise    Thank you

## 2023-10-06 NOTE — TELEPHONE ENCOUNTER
She has had her gallbladder, and appendix removed .  She does have a fever and she is going to go to the ER

## 2023-10-06 NOTE — ED TRIAGE NOTES
Pt to ED with Right flank pain and fever. Afebrile in route. Pt has urethral catheter for urinary retention. Pt has lower back pain that pt states she has kidney pain. Pt states she has been well hydrated but has decreased urinary output. None

## 2023-10-07 ENCOUNTER — APPOINTMENT (OUTPATIENT)
Dept: CT IMAGING | Age: 43
End: 2023-10-07
Payer: COMMERCIAL

## 2023-10-07 ENCOUNTER — HOSPITAL ENCOUNTER (EMERGENCY)
Age: 43
Discharge: HOME | End: 2023-10-07
Payer: COMMERCIAL

## 2023-10-07 VITALS
SYSTOLIC BLOOD PRESSURE: 102 MMHG | TEMPERATURE: 98.1 F | DIASTOLIC BLOOD PRESSURE: 74 MMHG | BODY MASS INDEX: 34.13 KG/M2 | WEIGHT: 169 LBS | RESPIRATION RATE: 16 BRPM | OXYGEN SATURATION: 98 % | HEART RATE: 85 BPM

## 2023-10-07 DIAGNOSIS — K52.9 ENTERITIS: Primary | ICD-10-CM

## 2023-10-07 DIAGNOSIS — R10.11 ABDOMINAL PAIN, RIGHT UPPER QUADRANT: ICD-10-CM

## 2023-10-07 LAB
ALBUMIN SERPL-MCNC: 4.1 G/DL (ref 3.4–5)
ALBUMIN/GLOB SERPL: 1.3 {RATIO} (ref 1.1–2.2)
ALP SERPL-CCNC: 119 U/L (ref 40–129)
ALT SERPL-CCNC: 33 U/L (ref 10–40)
ANION GAP SERPL CALCULATED.3IONS-SCNC: 12 MMOL/L (ref 3–16)
AST SERPL-CCNC: 14 U/L (ref 15–37)
BASOPHILS # BLD: 0.2 K/UL (ref 0–0.2)
BASOPHILS NFR BLD: 0.8 %
BILIRUB SERPL-MCNC: 0.3 MG/DL (ref 0–1)
BILIRUB UR QL STRIP.AUTO: NEGATIVE
BUN SERPL-MCNC: 18 MG/DL (ref 7–20)
CALCIUM SERPL-MCNC: 9 MG/DL (ref 8.3–10.6)
CHLORIDE SERPL-SCNC: 103 MMOL/L (ref 99–110)
CLARITY UR: CLEAR
CO2 SERPL-SCNC: 23 MMOL/L (ref 21–32)
COLOR UR: YELLOW
CREAT SERPL-MCNC: 0.7 MG/DL (ref 0.6–1.1)
DEPRECATED RDW RBC AUTO: 14.7 % (ref 12.4–15.4)
EOSINOPHIL # BLD: 0.1 K/UL (ref 0–0.6)
EOSINOPHIL NFR BLD: 0.7 %
GFR SERPLBLD CREATININE-BSD FMLA CKD-EPI: >60 ML/MIN/{1.73_M2}
GLUCOSE SERPL-MCNC: 118 MG/DL (ref 70–99)
GLUCOSE UR STRIP.AUTO-MCNC: NEGATIVE MG/DL
HCT VFR BLD AUTO: 44.3 % (ref 36–48)
HGB BLD-MCNC: 14.7 G/DL (ref 12–16)
HGB UR QL STRIP.AUTO: NEGATIVE
KETONES UR STRIP.AUTO-MCNC: NEGATIVE MG/DL
LACTATE BLDV-SCNC: 1.1 MMOL/L (ref 0.4–1.9)
LEUKOCYTE ESTERASE UR QL STRIP.AUTO: NEGATIVE
LIPASE SERPL-CCNC: 20 U/L (ref 13–60)
LYMPHOCYTES # BLD: 3 K/UL (ref 1–5.1)
LYMPHOCYTES NFR BLD: 15 %
MCH RBC QN AUTO: 28.9 PG (ref 26–34)
MCHC RBC AUTO-ENTMCNC: 33.1 G/DL (ref 31–36)
MCV RBC AUTO: 87.1 FL (ref 80–100)
MONOCYTES # BLD: 1 K/UL (ref 0–1.3)
MONOCYTES NFR BLD: 5.2 %
NEUTROPHILS # BLD: 15.8 K/UL (ref 1.7–7.7)
NEUTROPHILS NFR BLD: 78.3 %
NITRITE UR QL STRIP.AUTO: NEGATIVE
PH UR STRIP.AUTO: 5.5 [PH] (ref 5–8)
PLATELET # BLD AUTO: 311 K/UL (ref 135–450)
PMV BLD AUTO: 7.7 FL (ref 5–10.5)
POTASSIUM SERPL-SCNC: 3.7 MMOL/L (ref 3.5–5.1)
PROT SERPL-MCNC: 7.3 G/DL (ref 6.4–8.2)
PROT UR STRIP.AUTO-MCNC: NEGATIVE MG/DL
RBC # BLD AUTO: 5.08 M/UL (ref 4–5.2)
SODIUM SERPL-SCNC: 138 MMOL/L (ref 136–145)
SP GR UR STRIP.AUTO: 1.09 (ref 1–1.03)
UA COMPLETE W REFLEX CULTURE PNL UR: NORMAL
UA DIPSTICK W REFLEX MICRO PNL UR: NORMAL
URN SPEC COLLECT METH UR: NORMAL
UROBILINOGEN UR STRIP-ACNC: 0.2 E.U./DL
WBC # BLD AUTO: 20.2 K/UL (ref 4–11)

## 2023-10-07 PROCEDURE — 96367 TX/PROPH/DG ADDL SEQ IV INF: CPT

## 2023-10-07 PROCEDURE — 96374 THER/PROPH/DIAG INJ IV PUSH: CPT

## 2023-10-07 PROCEDURE — 6360000004 HC RX CONTRAST MEDICATION: Performed by: PHYSICIAN ASSISTANT

## 2023-10-07 PROCEDURE — 80053 COMPREHEN METABOLIC PANEL: CPT

## 2023-10-07 PROCEDURE — 6360000002 HC RX W HCPCS: Performed by: PHYSICIAN ASSISTANT

## 2023-10-07 PROCEDURE — 83690 ASSAY OF LIPASE: CPT

## 2023-10-07 PROCEDURE — 87040 BLOOD CULTURE FOR BACTERIA: CPT

## 2023-10-07 PROCEDURE — 96376 TX/PRO/DX INJ SAME DRUG ADON: CPT

## 2023-10-07 PROCEDURE — 99285 EMERGENCY DEPT VISIT HI MDM: CPT

## 2023-10-07 PROCEDURE — 96365 THER/PROPH/DIAG IV INF INIT: CPT

## 2023-10-07 PROCEDURE — 85025 COMPLETE CBC W/AUTO DIFF WBC: CPT

## 2023-10-07 PROCEDURE — 83605 ASSAY OF LACTIC ACID: CPT

## 2023-10-07 PROCEDURE — 2580000003 HC RX 258: Performed by: PHYSICIAN ASSISTANT

## 2023-10-07 PROCEDURE — 74177 CT ABD & PELVIS W/CONTRAST: CPT

## 2023-10-07 PROCEDURE — 81003 URINALYSIS AUTO W/O SCOPE: CPT

## 2023-10-07 PROCEDURE — 96375 TX/PRO/DX INJ NEW DRUG ADDON: CPT

## 2023-10-07 PROCEDURE — 36415 COLL VENOUS BLD VENIPUNCTURE: CPT

## 2023-10-07 RX ORDER — 0.9 % SODIUM CHLORIDE 0.9 %
500 INTRAVENOUS SOLUTION INTRAVENOUS ONCE
Status: COMPLETED | OUTPATIENT
Start: 2023-10-07 | End: 2023-10-07

## 2023-10-07 RX ORDER — 0.9 % SODIUM CHLORIDE 0.9 %
1000 INTRAVENOUS SOLUTION INTRAVENOUS ONCE
Status: COMPLETED | OUTPATIENT
Start: 2023-10-07 | End: 2023-10-07

## 2023-10-07 RX ORDER — ONDANSETRON 2 MG/ML
4 INJECTION INTRAMUSCULAR; INTRAVENOUS ONCE
Status: COMPLETED | OUTPATIENT
Start: 2023-10-07 | End: 2023-10-07

## 2023-10-07 RX ORDER — MORPHINE SULFATE 4 MG/ML
4 INJECTION, SOLUTION INTRAMUSCULAR; INTRAVENOUS ONCE
Status: COMPLETED | OUTPATIENT
Start: 2023-10-07 | End: 2023-10-07

## 2023-10-07 RX ORDER — PROMETHAZINE HYDROCHLORIDE 12.5 MG/1
12.5 TABLET ORAL EVERY 8 HOURS PRN
Qty: 20 TABLET | Refills: 0 | Status: SHIPPED | OUTPATIENT
Start: 2023-10-07 | End: 2023-10-14

## 2023-10-07 RX ORDER — SIMETHICONE 125 MG
125 TABLET,CHEWABLE ORAL EVERY 6 HOURS PRN
Qty: 60 TABLET | Refills: 0 | Status: SHIPPED | OUTPATIENT
Start: 2023-10-07 | End: 2023-10-10

## 2023-10-07 RX ORDER — METRONIDAZOLE 500 MG/1
500 TABLET ORAL 2 TIMES DAILY
Qty: 14 TABLET | Refills: 0 | Status: SHIPPED | OUTPATIENT
Start: 2023-10-07 | End: 2023-10-14

## 2023-10-07 RX ORDER — METRONIDAZOLE 500 MG/100ML
500 INJECTION, SOLUTION INTRAVENOUS ONCE
Status: COMPLETED | OUTPATIENT
Start: 2023-10-07 | End: 2023-10-07

## 2023-10-07 RX ADMIN — MORPHINE SULFATE 4 MG: 4 INJECTION, SOLUTION INTRAMUSCULAR; INTRAVENOUS at 16:57

## 2023-10-07 RX ADMIN — IOPAMIDOL 75 ML: 755 INJECTION, SOLUTION INTRAVENOUS at 15:13

## 2023-10-07 RX ADMIN — Medication 12.5 MG: at 15:55

## 2023-10-07 RX ADMIN — SODIUM CHLORIDE 500 ML: 9 INJECTION, SOLUTION INTRAVENOUS at 15:36

## 2023-10-07 RX ADMIN — SODIUM CHLORIDE 1000 ML: 9 INJECTION, SOLUTION INTRAVENOUS at 14:29

## 2023-10-07 RX ADMIN — METRONIDAZOLE 500 MG: 500 INJECTION, SOLUTION INTRAVENOUS at 14:47

## 2023-10-07 RX ADMIN — MORPHINE SULFATE 4 MG: 4 INJECTION, SOLUTION INTRAMUSCULAR; INTRAVENOUS at 14:21

## 2023-10-07 RX ADMIN — ONDANSETRON 4 MG: 2 INJECTION INTRAMUSCULAR; INTRAVENOUS at 14:21

## 2023-10-07 ASSESSMENT — PAIN SCALES - GENERAL
PAINLEVEL_OUTOF10: 8
PAINLEVEL_OUTOF10: 8
PAINLEVEL_OUTOF10: 6
PAINLEVEL_OUTOF10: 7
PAINLEVEL_OUTOF10: 8

## 2023-10-07 ASSESSMENT — PAIN - FUNCTIONAL ASSESSMENT: PAIN_FUNCTIONAL_ASSESSMENT: 0-10

## 2023-10-07 ASSESSMENT — PAIN DESCRIPTION - LOCATION: LOCATION: ABDOMEN

## 2023-10-07 ASSESSMENT — PAIN DESCRIPTION - PAIN TYPE: TYPE: ACUTE PAIN

## 2023-10-07 ASSESSMENT — PAIN DESCRIPTION - DESCRIPTORS: DESCRIPTORS: DISCOMFORT

## 2023-10-07 NOTE — ED NOTES
Patient states she is dry heaving, no emesis noted. Requesting phenergan. Michael hoffmann to order phenergan 12.5mg.       Berkley Grijalva RN  10/07/23 6008

## 2023-10-07 NOTE — ED NOTES
This RN obtained PIV, zero blood return. Flushed appropriately. Catheter is out of the vein about half way. Will monitor.       Nalini Cassidy RN  10/07/23 7395

## 2023-10-07 NOTE — DISCHARGE INSTRUCTIONS
Home in stable condition to eat a bland soft diet, use the lidocaine patches on the right abdomen as well as the medications written here. Stay well-hydrated, and monitor for gradual improvement. Follow with your family doctor in about 3 days for recheck and further care. Return to the ER for any emergency worsening or concern.

## 2023-10-07 NOTE — ED NOTES
Pt to CT at this time by CT tech. Pt transported via stretcher.       Shabbir Rose RN  10/07/23 0066

## 2023-10-08 LAB
BACTERIA BLD CULT ORG #2: NORMAL
BACTERIA BLD CULT: NORMAL

## 2023-10-09 SDOH — ECONOMIC STABILITY: FOOD INSECURITY: WITHIN THE PAST 12 MONTHS, THE FOOD YOU BOUGHT JUST DIDN'T LAST AND YOU DIDN'T HAVE MONEY TO GET MORE.: NEVER TRUE

## 2023-10-09 SDOH — ECONOMIC STABILITY: INCOME INSECURITY: HOW HARD IS IT FOR YOU TO PAY FOR THE VERY BASICS LIKE FOOD, HOUSING, MEDICAL CARE, AND HEATING?: NOT VERY HARD

## 2023-10-09 SDOH — ECONOMIC STABILITY: TRANSPORTATION INSECURITY
IN THE PAST 12 MONTHS, HAS LACK OF TRANSPORTATION KEPT YOU FROM MEETINGS, WORK, OR FROM GETTING THINGS NEEDED FOR DAILY LIVING?: NO

## 2023-10-09 SDOH — ECONOMIC STABILITY: HOUSING INSECURITY
IN THE LAST 12 MONTHS, WAS THERE A TIME WHEN YOU DID NOT HAVE A STEADY PLACE TO SLEEP OR SLEPT IN A SHELTER (INCLUDING NOW)?: NO

## 2023-10-09 SDOH — ECONOMIC STABILITY: FOOD INSECURITY: WITHIN THE PAST 12 MONTHS, YOU WORRIED THAT YOUR FOOD WOULD RUN OUT BEFORE YOU GOT MONEY TO BUY MORE.: NEVER TRUE

## 2023-10-10 ENCOUNTER — OFFICE VISIT (OUTPATIENT)
Dept: INTERNAL MEDICINE CLINIC | Age: 43
End: 2023-10-10
Payer: COMMERCIAL

## 2023-10-10 VITALS
WEIGHT: 166 LBS | OXYGEN SATURATION: 98 % | HEART RATE: 106 BPM | SYSTOLIC BLOOD PRESSURE: 122 MMHG | DIASTOLIC BLOOD PRESSURE: 70 MMHG | HEIGHT: 59 IN | BODY MASS INDEX: 33.47 KG/M2

## 2023-10-10 DIAGNOSIS — M54.16 LUMBAR BACK PAIN WITH RADICULOPATHY AFFECTING LEFT LOWER EXTREMITY: ICD-10-CM

## 2023-10-10 DIAGNOSIS — R10.11 RUQ PAIN: Primary | ICD-10-CM

## 2023-10-10 DIAGNOSIS — D72.829 LEUKOCYTOSIS, UNSPECIFIED TYPE: ICD-10-CM

## 2023-10-10 LAB
BASOPHILS # BLD: 0.1 K/UL (ref 0–0.2)
BASOPHILS NFR BLD: 1.3 %
DEPRECATED RDW RBC AUTO: 14.8 % (ref 12.4–15.4)
EOSINOPHIL # BLD: 0.2 K/UL (ref 0–0.6)
EOSINOPHIL NFR BLD: 2.5 %
HCT VFR BLD AUTO: 43.4 % (ref 36–48)
HGB BLD-MCNC: 14.9 G/DL (ref 12–16)
LYMPHOCYTES # BLD: 2.6 K/UL (ref 1–5.1)
LYMPHOCYTES NFR BLD: 26.3 %
MCH RBC QN AUTO: 29.6 PG (ref 26–34)
MCHC RBC AUTO-ENTMCNC: 34.3 G/DL (ref 31–36)
MCV RBC AUTO: 86.2 FL (ref 80–100)
MONOCYTES # BLD: 0.5 K/UL (ref 0–1.3)
MONOCYTES NFR BLD: 5.1 %
NEUTROPHILS # BLD: 6.4 K/UL (ref 1.7–7.7)
NEUTROPHILS NFR BLD: 64.8 %
PLATELET # BLD AUTO: 290 K/UL (ref 135–450)
PMV BLD AUTO: 8.8 FL (ref 5–10.5)
RBC # BLD AUTO: 5.03 M/UL (ref 4–5.2)
WBC # BLD AUTO: 9.9 K/UL (ref 4–11)

## 2023-10-10 PROCEDURE — 36415 COLL VENOUS BLD VENIPUNCTURE: CPT | Performed by: NURSE PRACTITIONER

## 2023-10-10 PROCEDURE — 3074F SYST BP LT 130 MM HG: CPT | Performed by: NURSE PRACTITIONER

## 2023-10-10 PROCEDURE — 99214 OFFICE O/P EST MOD 30 MIN: CPT | Performed by: NURSE PRACTITIONER

## 2023-10-10 PROCEDURE — G8427 DOCREV CUR MEDS BY ELIG CLIN: HCPCS | Performed by: NURSE PRACTITIONER

## 2023-10-10 PROCEDURE — 3078F DIAST BP <80 MM HG: CPT | Performed by: NURSE PRACTITIONER

## 2023-10-10 PROCEDURE — 4004F PT TOBACCO SCREEN RCVD TLK: CPT | Performed by: NURSE PRACTITIONER

## 2023-10-10 PROCEDURE — G8484 FLU IMMUNIZE NO ADMIN: HCPCS | Performed by: NURSE PRACTITIONER

## 2023-10-10 PROCEDURE — G8417 CALC BMI ABV UP PARAM F/U: HCPCS | Performed by: NURSE PRACTITIONER

## 2023-10-10 RX ORDER — GABAPENTIN 100 MG/1
100 CAPSULE ORAL 2 TIMES DAILY
Qty: 60 CAPSULE | Refills: 0 | Status: CANCELLED | OUTPATIENT
Start: 2023-10-10 | End: 2023-11-09

## 2023-10-10 RX ORDER — GABAPENTIN 100 MG/1
100 CAPSULE ORAL 2 TIMES DAILY
Qty: 60 CAPSULE | Refills: 0 | Status: SHIPPED | OUTPATIENT
Start: 2023-10-10 | End: 2023-11-09

## 2023-10-10 ASSESSMENT — ENCOUNTER SYMPTOMS
ABDOMINAL PAIN: 1
CONSTIPATION: 0
SHORTNESS OF BREATH: 0
NAUSEA: 1
DIARRHEA: 0
BACK PAIN: 1

## 2023-10-10 NOTE — PATIENT INSTRUCTIONS
See Dr Yola Avilez to ER for any fevers, unable to keep liquids down  Take 100 mg gabapentin in addition to 300 mg twice a day

## 2023-10-10 NOTE — TELEPHONE ENCOUNTER
Can you please send the gabapentin  to [facility] [facility] [address] , [facility] [address] .  Thank you

## 2023-10-11 LAB
BACTERIA BLD CULT ORG #2: NORMAL
BACTERIA BLD CULT: NORMAL

## 2023-10-11 SDOH — HEALTH STABILITY: PHYSICAL HEALTH: ON AVERAGE, HOW MANY DAYS PER WEEK DO YOU ENGAGE IN MODERATE TO STRENUOUS EXERCISE (LIKE A BRISK WALK)?: 4 DAYS

## 2023-10-11 SDOH — HEALTH STABILITY: PHYSICAL HEALTH: ON AVERAGE, HOW MANY MINUTES DO YOU ENGAGE IN EXERCISE AT THIS LEVEL?: 100 MIN

## 2023-10-11 ASSESSMENT — SOCIAL DETERMINANTS OF HEALTH (SDOH)
WITHIN THE LAST YEAR, HAVE YOU BEEN AFRAID OF YOUR PARTNER OR EX-PARTNER?: NO
WITHIN THE LAST YEAR, HAVE YOU BEEN HUMILIATED OR EMOTIONALLY ABUSED IN OTHER WAYS BY YOUR PARTNER OR EX-PARTNER?: NO
WITHIN THE LAST YEAR, HAVE YOU BEEN KICKED, HIT, SLAPPED, OR OTHERWISE PHYSICALLY HURT BY YOUR PARTNER OR EX-PARTNER?: NO
WITHIN THE LAST YEAR, HAVE TO BEEN RAPED OR FORCED TO HAVE ANY KIND OF SEXUAL ACTIVITY BY YOUR PARTNER OR EX-PARTNER?: NO

## 2023-10-12 ENCOUNTER — OFFICE VISIT (OUTPATIENT)
Dept: ORTHOPEDIC SURGERY | Age: 43
End: 2023-10-12
Payer: COMMERCIAL

## 2023-10-12 VITALS — BODY MASS INDEX: 33.47 KG/M2 | WEIGHT: 166 LBS | HEIGHT: 59 IN

## 2023-10-12 DIAGNOSIS — S39.012A STRAIN OF LUMBAR REGION, INITIAL ENCOUNTER: ICD-10-CM

## 2023-10-12 DIAGNOSIS — M25.562 LEFT KNEE PAIN, UNSPECIFIED CHRONICITY: ICD-10-CM

## 2023-10-12 DIAGNOSIS — M19.012 ARTHRITIS OF LEFT ACROMIOCLAVICULAR JOINT: ICD-10-CM

## 2023-10-12 DIAGNOSIS — M75.82 ROTATOR CUFF TENDONITIS, LEFT: Primary | ICD-10-CM

## 2023-10-12 PROCEDURE — 4004F PT TOBACCO SCREEN RCVD TLK: CPT | Performed by: ORTHOPAEDIC SURGERY

## 2023-10-12 PROCEDURE — G8417 CALC BMI ABV UP PARAM F/U: HCPCS | Performed by: ORTHOPAEDIC SURGERY

## 2023-10-12 PROCEDURE — G8484 FLU IMMUNIZE NO ADMIN: HCPCS | Performed by: ORTHOPAEDIC SURGERY

## 2023-10-12 PROCEDURE — 99214 OFFICE O/P EST MOD 30 MIN: CPT | Performed by: ORTHOPAEDIC SURGERY

## 2023-10-12 PROCEDURE — G8427 DOCREV CUR MEDS BY ELIG CLIN: HCPCS | Performed by: ORTHOPAEDIC SURGERY

## 2023-10-12 NOTE — PROGRESS NOTES
2022.  MRI of the lumbar spine performed back in September at [facility] with essentially normal.  There is very mild disc bulging at the L3-L4 level, but this was otherwise normal.  X-rays of the left knee obtained back in May were extensively reviewed. The joint line is well-preserved. There are no lytic or blastic lesions within the bone. Impression: Left shoulder rotator cuff tendonitis #2 left shoulder AC joint arthritis 3. lumbar strain #4 left knee pain    Plan: We again instructed the patient on conservative treatment. We encouraged the patient to perform home exercises with stretching strengthening. The patient will modify her activities. The patient will take over-the-counter ibuprofen 600 mg twice a day with food. We instructed the patient on stretching for her back. She may use heat for the back. We did offer the patient a left shoulder injection, but she declined.

## 2023-10-16 ENCOUNTER — TELEPHONE (OUTPATIENT)
Dept: INTERNAL MEDICINE CLINIC | Age: 43
End: 2023-10-16

## 2023-10-16 DIAGNOSIS — M54.16 LUMBAR BACK PAIN WITH RADICULOPATHY AFFECTING LEFT LOWER EXTREMITY: Primary | ICD-10-CM

## 2023-10-16 NOTE — TELEPHONE ENCOUNTER
Her back is hurting really bad today and tylenol is not helping, and ibprofren is not helping either  could you please give her something stronger till she gets in to see a DR at [facility] .      Allergies antibiotics    [facility] [phone]

## 2023-10-16 NOTE — TELEPHONE ENCOUNTER
Patient states that [facility] is reviewing her chart, before making her appt. She is hoping to hear back from them today or tomorrow. Was hoping to get a short supply of Tramadol.

## 2023-10-17 RX ORDER — TRAMADOL HYDROCHLORIDE 50 MG/1
50 TABLET ORAL EVERY 4 HOURS PRN
Qty: 18 TABLET | Refills: 0 | Status: SHIPPED | OUTPATIENT
Start: 2023-10-17 | End: 2023-10-20

## 2023-10-18 NOTE — TELEPHONE ENCOUNTER
Spoke to pt, informed her of message. She stated she has already picked medication up & thank you. She also stated that she has already seen Dr Surinder Jackson, and that she still has not heard back from [place], she would like a new referral to Dr Treasure Villagomez.      Referral to Dr Anthony Chau pending

## 2023-10-24 ENCOUNTER — APPOINTMENT (OUTPATIENT)
Dept: MRI IMAGING | Age: 43
End: 2023-10-24
Payer: COMMERCIAL

## 2023-10-24 ENCOUNTER — HOSPITAL ENCOUNTER (EMERGENCY)
Age: 43
Discharge: HOME | End: 2023-10-24
Attending: EMERGENCY MEDICINE
Payer: COMMERCIAL

## 2023-10-24 VITALS
BODY MASS INDEX: 33.93 KG/M2 | RESPIRATION RATE: 16 BRPM | HEART RATE: 96 BPM | SYSTOLIC BLOOD PRESSURE: 121 MMHG | WEIGHT: 167.99 LBS | TEMPERATURE: 99 F | DIASTOLIC BLOOD PRESSURE: 88 MMHG | OXYGEN SATURATION: 98 %

## 2023-10-24 DIAGNOSIS — M54.50 ACUTE MIDLINE LOW BACK PAIN WITHOUT SCIATICA: Primary | ICD-10-CM

## 2023-10-24 DIAGNOSIS — R32 URINARY INCONTINENCE, UNSPECIFIED TYPE: ICD-10-CM

## 2023-10-24 DIAGNOSIS — R70.0 ELEVATED ERYTHROCYTE SEDIMENTATION RATE: ICD-10-CM

## 2023-10-24 LAB
ALBUMIN SERPL-MCNC: 4.2 G/DL (ref 3.4–5)
ALBUMIN/GLOB SERPL: 1.4 {RATIO} (ref 1.1–2.2)
ALP SERPL-CCNC: 116 U/L (ref 40–129)
ALT SERPL-CCNC: 11 U/L (ref 10–40)
ANION GAP SERPL CALCULATED.3IONS-SCNC: 20 MMOL/L (ref 3–16)
AST SERPL-CCNC: 14 U/L (ref 15–37)
BASOPHILS # BLD: 0.1 K/UL (ref 0–0.2)
BASOPHILS NFR BLD: 0.5 %
BILIRUB SERPL-MCNC: 0.4 MG/DL (ref 0–1)
BILIRUB UR QL STRIP.AUTO: NEGATIVE
BUN SERPL-MCNC: 11 MG/DL (ref 7–20)
CALCIUM SERPL-MCNC: 9.7 MG/DL (ref 8.3–10.6)
CHLORIDE SERPL-SCNC: 93 MMOL/L (ref 99–110)
CLARITY UR: CLEAR
CO2 SERPL-SCNC: 20 MMOL/L (ref 21–32)
COLOR UR: YELLOW
CREAT SERPL-MCNC: 0.7 MG/DL (ref 0.6–1.1)
CRP SERPL-MCNC: 6.2 MG/L (ref 0–5.1)
DEPRECATED RDW RBC AUTO: 14.6 % (ref 12.4–15.4)
EOSINOPHIL # BLD: 0.1 K/UL (ref 0–0.6)
EOSINOPHIL NFR BLD: 1 %
ERYTHROCYTE [SEDIMENTATION RATE] IN BLOOD BY WESTERGREN METHOD: 25 MM/HR (ref 0–20)
GFR SERPLBLD CREATININE-BSD FMLA CKD-EPI: >60 ML/MIN/{1.73_M2}
GLUCOSE SERPL-MCNC: 113 MG/DL (ref 70–99)
GLUCOSE UR STRIP.AUTO-MCNC: NEGATIVE MG/DL
HCT VFR BLD AUTO: 47.6 % (ref 36–48)
HGB BLD-MCNC: 16.2 G/DL (ref 12–16)
HGB UR QL STRIP.AUTO: NEGATIVE
KETONES UR STRIP.AUTO-MCNC: NEGATIVE MG/DL
LACTATE BLDV-SCNC: 1.3 MMOL/L (ref 0.4–2)
LEUKOCYTE ESTERASE UR QL STRIP.AUTO: NEGATIVE
LYMPHOCYTES # BLD: 2.3 K/UL (ref 1–5.1)
LYMPHOCYTES NFR BLD: 21.3 %
MCH RBC QN AUTO: 29.4 PG (ref 26–34)
MCHC RBC AUTO-ENTMCNC: 34 G/DL (ref 31–36)
MCV RBC AUTO: 86.4 FL (ref 80–100)
MONOCYTES # BLD: 0.6 K/UL (ref 0–1.3)
MONOCYTES NFR BLD: 5.4 %
NEUTROPHILS # BLD: 7.6 K/UL (ref 1.7–7.7)
NEUTROPHILS NFR BLD: 71.8 %
NITRITE UR QL STRIP.AUTO: NEGATIVE
PH UR STRIP.AUTO: 6.5 [PH] (ref 5–8)
PLATELET # BLD AUTO: 292 K/UL (ref 135–450)
PMV BLD AUTO: 7.6 FL (ref 5–10.5)
POTASSIUM SERPL-SCNC: 4.2 MMOL/L (ref 3.5–5.1)
PROT SERPL-MCNC: 7.3 G/DL (ref 6.4–8.2)
PROT UR STRIP.AUTO-MCNC: NEGATIVE MG/DL
RBC # BLD AUTO: 5.52 M/UL (ref 4–5.2)
SODIUM SERPL-SCNC: 133 MMOL/L (ref 136–145)
SP GR UR STRIP.AUTO: 1.01 (ref 1–1.03)
UA COMPLETE W REFLEX CULTURE PNL UR: NORMAL
UA DIPSTICK W REFLEX MICRO PNL UR: NORMAL
URN SPEC COLLECT METH UR: NORMAL
UROBILINOGEN UR STRIP-ACNC: 0.2 E.U./DL
WBC # BLD AUTO: 10.6 K/UL (ref 4–11)

## 2023-10-24 PROCEDURE — 85652 RBC SED RATE AUTOMATED: CPT

## 2023-10-24 PROCEDURE — 83605 ASSAY OF LACTIC ACID: CPT

## 2023-10-24 PROCEDURE — 51701 INSERT BLADDER CATHETER: CPT

## 2023-10-24 PROCEDURE — 6370000000 HC RX 637 (ALT 250 FOR IP): Performed by: NURSE PRACTITIONER

## 2023-10-24 PROCEDURE — 2580000003 HC RX 258: Performed by: NURSE PRACTITIONER

## 2023-10-24 PROCEDURE — 96365 THER/PROPH/DIAG IV INF INIT: CPT

## 2023-10-24 PROCEDURE — A9577 INJ MULTIHANCE: HCPCS | Performed by: NURSE PRACTITIONER

## 2023-10-24 PROCEDURE — 72158 MRI LUMBAR SPINE W/O & W/DYE: CPT

## 2023-10-24 PROCEDURE — 6360000004 HC RX CONTRAST MEDICATION: Performed by: NURSE PRACTITIONER

## 2023-10-24 PROCEDURE — 86140 C-REACTIVE PROTEIN: CPT

## 2023-10-24 PROCEDURE — 99285 EMERGENCY DEPT VISIT HI MDM: CPT

## 2023-10-24 PROCEDURE — 6360000002 HC RX W HCPCS: Performed by: NURSE PRACTITIONER

## 2023-10-24 PROCEDURE — 81003 URINALYSIS AUTO W/O SCOPE: CPT

## 2023-10-24 PROCEDURE — 96375 TX/PRO/DX INJ NEW DRUG ADDON: CPT

## 2023-10-24 PROCEDURE — 80053 COMPREHEN METABOLIC PANEL: CPT

## 2023-10-24 PROCEDURE — 93005 ELECTROCARDIOGRAM TRACING: CPT | Performed by: NURSE PRACTITIONER

## 2023-10-24 PROCEDURE — 85025 COMPLETE CBC W/AUTO DIFF WBC: CPT

## 2023-10-24 RX ORDER — 0.9 % SODIUM CHLORIDE 0.9 %
1000 INTRAVENOUS SOLUTION INTRAVENOUS ONCE
Status: COMPLETED | OUTPATIENT
Start: 2023-10-24 | End: 2023-10-24

## 2023-10-24 RX ORDER — FENTANYL CITRATE 50 UG/ML
50 INJECTION, SOLUTION INTRAMUSCULAR; INTRAVENOUS ONCE
Status: COMPLETED | OUTPATIENT
Start: 2023-10-24 | End: 2023-10-24

## 2023-10-24 RX ORDER — LIDOCAINE 4 G/G
1 PATCH TOPICAL ONCE
Status: DISCONTINUED | OUTPATIENT
Start: 2023-10-24 | End: 2023-10-24 | Stop reason: HOSPADM

## 2023-10-24 RX ORDER — ONDANSETRON 2 MG/ML
4 INJECTION INTRAMUSCULAR; INTRAVENOUS ONCE
Status: COMPLETED | OUTPATIENT
Start: 2023-10-24 | End: 2023-10-24

## 2023-10-24 RX ORDER — PREDNISONE 10 MG/1
40 TABLET ORAL DAILY
Qty: 20 TABLET | Refills: 0 | Status: SHIPPED | OUTPATIENT
Start: 2023-10-24 | End: 2023-10-29

## 2023-10-24 RX ORDER — PREDNISONE 20 MG/1
60 TABLET ORAL ONCE
Status: COMPLETED | OUTPATIENT
Start: 2023-10-24 | End: 2023-10-24

## 2023-10-24 RX ADMIN — PREDNISONE 60 MG: 20 TABLET ORAL at 19:07

## 2023-10-24 RX ADMIN — GADOBENATE DIMEGLUMINE 15 ML: 529 INJECTION, SOLUTION INTRAVENOUS at 16:27

## 2023-10-24 RX ADMIN — ONDANSETRON 4 MG: 2 INJECTION INTRAMUSCULAR; INTRAVENOUS at 15:50

## 2023-10-24 RX ADMIN — SODIUM CHLORIDE 1000 ML: 9 INJECTION, SOLUTION INTRAVENOUS at 15:48

## 2023-10-24 RX ADMIN — PROMETHAZINE HYDROCHLORIDE 12.5 MG: 25 INJECTION INTRAMUSCULAR; INTRAVENOUS at 19:35

## 2023-10-24 RX ADMIN — FENTANYL CITRATE 50 MCG: 50 INJECTION, SOLUTION INTRAMUSCULAR; INTRAVENOUS at 15:50

## 2023-10-24 ASSESSMENT — PAIN DESCRIPTION - PAIN TYPE
TYPE: ACUTE PAIN
TYPE: ACUTE PAIN

## 2023-10-24 ASSESSMENT — PAIN DESCRIPTION - ORIENTATION
ORIENTATION: MID;RIGHT
ORIENTATION: MID

## 2023-10-24 ASSESSMENT — PAIN SCALES - GENERAL
PAINLEVEL_OUTOF10: 7
PAINLEVEL_OUTOF10: 7
PAINLEVEL_OUTOF10: 3
PAINLEVEL_OUTOF10: 5

## 2023-10-24 ASSESSMENT — PAIN - FUNCTIONAL ASSESSMENT
PAIN_FUNCTIONAL_ASSESSMENT: PREVENTS OR INTERFERES SOME ACTIVE ACTIVITIES AND ADLS
PAIN_FUNCTIONAL_ASSESSMENT: ACTIVITIES ARE NOT PREVENTED
PAIN_FUNCTIONAL_ASSESSMENT: 0-10

## 2023-10-24 ASSESSMENT — PAIN DESCRIPTION - LOCATION
LOCATION: BACK
LOCATION: BACK

## 2023-10-24 ASSESSMENT — PAIN DESCRIPTION - FREQUENCY: FREQUENCY: CONTINUOUS

## 2023-10-24 ASSESSMENT — PAIN DESCRIPTION - DESCRIPTORS
DESCRIPTORS: ACHING
DESCRIPTORS: ACHING

## 2023-10-24 ASSESSMENT — PAIN DESCRIPTION - ONSET: ONSET: ON-GOING

## 2023-10-24 NOTE — ED NOTES
Report given to receiving RN Roselyn Waggoner, all questions answered.       Alexander Ramos RN  10/24/23 1919

## 2023-10-24 NOTE — DISCHARGE INSTRUCTIONS
Return to ED for new or worsening symptoms. Medications as prescribed. Follow up with your primary care provider in the next 1-2 days.

## 2023-10-24 NOTE — ED NOTES
Pt attempted to provide urine sample without success, states she is not able to urinate at this time. FREDRICK Wells made aware, verbal order received to straight cath pt for urine.      Macy Mohr RN  10/24/23 9803

## 2023-10-24 NOTE — ED NOTES
Pt resting in bed comfortably with hob elevated. No s/s or c/o pain or distress noted or reported. Respirations easy and even. Call light in reach, will continue to monitor closely.         Rayna Duenas RN  10/24/23 1918

## 2023-10-24 NOTE — ED PROVIDER NOTES
[address] [facility] [address] [address]        Pt Name: William [place]  MRN: [id number]  [address] [facility] 1980  Date of evaluation: 10/24/2023  Provider: FREDRICK Long - CNP  PCP: FREDRICK Whitney  Note Started: 1:52 PM EDT 10/24/23       I have seen and evaluated this patient with my supervising physician, Dr. Anders Sosa. CHIEF COMPLAINT       Chief Complaint   Patient presents with    Back Pain     Pt presents to the ED with mid upper back pain for over a month; pt stated she has been dealing with numbness in the left leg, but today her right leg started to go numb. Denies: n/v/d, CP, or SOB, dysuria       HISTORY OF PRESENT ILLNESS: 1 or more Elements     History from : Patient    Limitations to history : None    William [place] is a 41 y.o. nontoxic, well-appearing female who presents to the emerged from for evaluation of mid lower back pain for approximately 1 month with radiation of the pain down her left leg and began with altered sensation in her right leg today causing difficulty with ambulating due to weakness, and incontinence of urine. She describes the pain as \"sharp\" rated severity 8/10. Accompanying symptoms include nausea. Denies vomiting, fever, chills, sweats, diarrhea, saddle anesthesia, urinary frequency, urgency, dysuria, retention, other symptoms/concerns. Nursing Notes were all reviewed and agreed with or any disagreements were addressed in the HPI. REVIEW OF SYSTEMS :      Review of Systems   Constitutional:  Negative for chills, diaphoresis, fatigue and fever. HENT:  Negative for congestion and sore throat. Eyes:  Negative for pain and visual disturbance. Respiratory:  Negative for cough and shortness of breath. Cardiovascular:  Negative for chest pain and leg swelling. Gastrointestinal:  Negative for abdominal pain, anal bleeding, diarrhea, nausea and vomiting.    Genitourinary:  Negative for difficulty

## 2023-10-25 ENCOUNTER — TELEPHONE (OUTPATIENT)
Dept: INTERNAL MEDICINE CLINIC | Age: 43
End: 2023-10-25

## 2023-10-25 LAB
EKG ATRIAL RATE: 109 BPM
EKG DIAGNOSIS: NORMAL
EKG P AXIS: 63 DEGREES
EKG P-R INTERVAL: 130 MS
EKG Q-T INTERVAL: 332 MS
EKG QRS DURATION: 72 MS
EKG QTC CALCULATION (BAZETT): 447 MS
EKG R AXIS: -7 DEGREES
EKG T AXIS: 49 DEGREES
EKG VENTRICULAR RATE: 109 BPM

## 2023-10-25 PROCEDURE — 93010 ELECTROCARDIOGRAM REPORT: CPT | Performed by: INTERNAL MEDICINE

## 2023-10-25 NOTE — TELEPHONE ENCOUNTER
Pt called, was seen in the ER yesterday, they didn't really find anything, said maybe disc in her back? Her pulse was up yesterday. Today, she feels shaky, dizzy, fever, still nauseas. Feels like her heart is racing.     Please advise    Thank you

## 2023-10-25 NOTE — ED NOTES
Pt discharged back home, reviewed discharged instructions with pt follow up care , pain control and return precautions discussed , pt verbalized understanding. Pt ambulated out of the department with steady gait .           Alexandra Lee RN  10/24/23 2024

## 2023-10-26 ENCOUNTER — APPOINTMENT (OUTPATIENT)
Dept: CT IMAGING | Age: 43
End: 2023-10-26
Payer: COMMERCIAL

## 2023-10-26 ENCOUNTER — OFFICE VISIT (OUTPATIENT)
Dept: FAMILY MEDICINE CLINIC | Age: 43
End: 2023-10-26
Payer: COMMERCIAL

## 2023-10-26 ENCOUNTER — HOSPITAL ENCOUNTER (EMERGENCY)
Age: 43
Discharge: HOME | End: 2023-10-26
Payer: COMMERCIAL

## 2023-10-26 ENCOUNTER — APPOINTMENT (OUTPATIENT)
Dept: GENERAL RADIOLOGY | Age: 43
End: 2023-10-26
Payer: COMMERCIAL

## 2023-10-26 VITALS
WEIGHT: 164 LBS | TEMPERATURE: 97.8 F | DIASTOLIC BLOOD PRESSURE: 70 MMHG | SYSTOLIC BLOOD PRESSURE: 124 MMHG | HEART RATE: 120 BPM | OXYGEN SATURATION: 98 % | HEIGHT: 59 IN | BODY MASS INDEX: 33.06 KG/M2

## 2023-10-26 VITALS
WEIGHT: 168.65 LBS | TEMPERATURE: 98.3 F | BODY MASS INDEX: 34 KG/M2 | DIASTOLIC BLOOD PRESSURE: 70 MMHG | OXYGEN SATURATION: 94 % | HEIGHT: 59 IN | SYSTOLIC BLOOD PRESSURE: 101 MMHG | HEART RATE: 90 BPM | RESPIRATION RATE: 26 BRPM

## 2023-10-26 DIAGNOSIS — R00.0 TACHYCARDIA: Primary | ICD-10-CM

## 2023-10-26 DIAGNOSIS — R07.9 CHEST PAIN, UNSPECIFIED TYPE: ICD-10-CM

## 2023-10-26 LAB
ALBUMIN SERPL-MCNC: 4.5 G/DL (ref 3.4–5)
ALBUMIN/GLOB SERPL: 1.5 {RATIO} (ref 1.1–2.2)
ALP SERPL-CCNC: 115 U/L (ref 40–129)
ALT SERPL-CCNC: 11 U/L (ref 10–40)
ANION GAP SERPL CALCULATED.3IONS-SCNC: 13 MMOL/L (ref 3–16)
AST SERPL-CCNC: 12 U/L (ref 15–37)
BASOPHILS # BLD: 0.1 K/UL (ref 0–0.2)
BASOPHILS NFR BLD: 0.8 %
BILIRUB SERPL-MCNC: 0.4 MG/DL (ref 0–1)
BILIRUB UR QL STRIP.AUTO: NEGATIVE
BUN SERPL-MCNC: 11 MG/DL (ref 7–20)
CALCIUM SERPL-MCNC: 9.5 MG/DL (ref 8.3–10.6)
CHLORIDE SERPL-SCNC: 102 MMOL/L (ref 99–110)
CLARITY UR: CLEAR
CO2 SERPL-SCNC: 25 MMOL/L (ref 21–32)
COLOR UR: YELLOW
CREAT SERPL-MCNC: 0.8 MG/DL (ref 0.6–1.1)
DEPRECATED RDW RBC AUTO: 14.7 % (ref 12.4–15.4)
EOSINOPHIL # BLD: 0.1 K/UL (ref 0–0.6)
EOSINOPHIL NFR BLD: 1.4 %
GFR SERPLBLD CREATININE-BSD FMLA CKD-EPI: >60 ML/MIN/{1.73_M2}
GLUCOSE SERPL-MCNC: 92 MG/DL (ref 70–99)
GLUCOSE UR STRIP.AUTO-MCNC: NEGATIVE MG/DL
HCG UR QL: NEGATIVE
HCT VFR BLD AUTO: 48.1 % (ref 36–48)
HGB BLD-MCNC: 16.5 G/DL (ref 12–16)
HGB UR QL STRIP.AUTO: NEGATIVE
KETONES UR STRIP.AUTO-MCNC: NEGATIVE MG/DL
LEUKOCYTE ESTERASE UR QL STRIP.AUTO: NEGATIVE
LYMPHOCYTES # BLD: 2.6 K/UL (ref 1–5.1)
LYMPHOCYTES NFR BLD: 27.1 %
MCH RBC QN AUTO: 29.4 PG (ref 26–34)
MCHC RBC AUTO-ENTMCNC: 34.3 G/DL (ref 31–36)
MCV RBC AUTO: 85.8 FL (ref 80–100)
MONOCYTES # BLD: 0.5 K/UL (ref 0–1.3)
MONOCYTES NFR BLD: 4.9 %
NEUTROPHILS # BLD: 6.2 K/UL (ref 1.7–7.7)
NEUTROPHILS NFR BLD: 65.8 %
NITRITE UR QL STRIP.AUTO: NEGATIVE
NT-PROBNP SERPL-MCNC: <36 PG/ML (ref 0–124)
PH UR STRIP.AUTO: 5.5 [PH] (ref 5–8)
PLATELET # BLD AUTO: 271 K/UL (ref 135–450)
PMV BLD AUTO: 7.5 FL (ref 5–10.5)
POTASSIUM SERPL-SCNC: 3.8 MMOL/L (ref 3.5–5.1)
PROT SERPL-MCNC: 7.5 G/DL (ref 6.4–8.2)
PROT UR STRIP.AUTO-MCNC: NEGATIVE MG/DL
RBC # BLD AUTO: 5.6 M/UL (ref 4–5.2)
SODIUM SERPL-SCNC: 140 MMOL/L (ref 136–145)
SP GR UR STRIP.AUTO: 1.01 (ref 1–1.03)
TROPONIN, HIGH SENSITIVITY: <6 NG/L (ref 0–14)
UA COMPLETE W REFLEX CULTURE PNL UR: NORMAL
UA DIPSTICK W REFLEX MICRO PNL UR: NORMAL
URN SPEC COLLECT METH UR: NORMAL
UROBILINOGEN UR STRIP-ACNC: 0.2 E.U./DL
WBC # BLD AUTO: 9.5 K/UL (ref 4–11)

## 2023-10-26 PROCEDURE — 71046 X-RAY EXAM CHEST 2 VIEWS: CPT

## 2023-10-26 PROCEDURE — 85025 COMPLETE CBC W/AUTO DIFF WBC: CPT

## 2023-10-26 PROCEDURE — 96365 THER/PROPH/DIAG IV INF INIT: CPT

## 2023-10-26 PROCEDURE — 71260 CT THORAX DX C+: CPT

## 2023-10-26 PROCEDURE — 3074F SYST BP LT 130 MM HG: CPT | Performed by: NURSE PRACTITIONER

## 2023-10-26 PROCEDURE — 4004F PT TOBACCO SCREEN RCVD TLK: CPT | Performed by: NURSE PRACTITIONER

## 2023-10-26 PROCEDURE — G8427 DOCREV CUR MEDS BY ELIG CLIN: HCPCS | Performed by: NURSE PRACTITIONER

## 2023-10-26 PROCEDURE — 83880 ASSAY OF NATRIURETIC PEPTIDE: CPT

## 2023-10-26 PROCEDURE — 96375 TX/PRO/DX INJ NEW DRUG ADDON: CPT

## 2023-10-26 PROCEDURE — 6360000004 HC RX CONTRAST MEDICATION: Performed by: PHYSICIAN ASSISTANT

## 2023-10-26 PROCEDURE — 99285 EMERGENCY DEPT VISIT HI MDM: CPT

## 2023-10-26 PROCEDURE — G8484 FLU IMMUNIZE NO ADMIN: HCPCS | Performed by: NURSE PRACTITIONER

## 2023-10-26 PROCEDURE — 99214 OFFICE O/P EST MOD 30 MIN: CPT | Performed by: NURSE PRACTITIONER

## 2023-10-26 PROCEDURE — 6360000002 HC RX W HCPCS: Performed by: PHYSICIAN ASSISTANT

## 2023-10-26 PROCEDURE — 3078F DIAST BP <80 MM HG: CPT | Performed by: NURSE PRACTITIONER

## 2023-10-26 PROCEDURE — 93005 ELECTROCARDIOGRAM TRACING: CPT | Performed by: PHYSICIAN ASSISTANT

## 2023-10-26 PROCEDURE — 84703 CHORIONIC GONADOTROPIN ASSAY: CPT

## 2023-10-26 PROCEDURE — G8417 CALC BMI ABV UP PARAM F/U: HCPCS | Performed by: NURSE PRACTITIONER

## 2023-10-26 PROCEDURE — 80053 COMPREHEN METABOLIC PANEL: CPT

## 2023-10-26 PROCEDURE — 81003 URINALYSIS AUTO W/O SCOPE: CPT

## 2023-10-26 PROCEDURE — 93005 ELECTROCARDIOGRAM TRACING: CPT | Performed by: STUDENT IN AN ORGANIZED HEALTH CARE EDUCATION/TRAINING PROGRAM

## 2023-10-26 PROCEDURE — 36415 COLL VENOUS BLD VENIPUNCTURE: CPT

## 2023-10-26 PROCEDURE — 84484 ASSAY OF TROPONIN QUANT: CPT

## 2023-10-26 RX ORDER — ONDANSETRON 2 MG/ML
4 INJECTION INTRAMUSCULAR; INTRAVENOUS ONCE
Status: COMPLETED | OUTPATIENT
Start: 2023-10-26 | End: 2023-10-26

## 2023-10-26 RX ORDER — MORPHINE SULFATE 2 MG/ML
2 INJECTION, SOLUTION INTRAMUSCULAR; INTRAVENOUS ONCE
Status: COMPLETED | OUTPATIENT
Start: 2023-10-26 | End: 2023-10-26

## 2023-10-26 RX ADMIN — MORPHINE SULFATE 2 MG: 2 INJECTION, SOLUTION INTRAMUSCULAR; INTRAVENOUS at 17:39

## 2023-10-26 RX ADMIN — Medication 25 MG: at 20:04

## 2023-10-26 RX ADMIN — IOPAMIDOL 75 ML: 755 INJECTION, SOLUTION INTRAVENOUS at 18:32

## 2023-10-26 RX ADMIN — ONDANSETRON 4 MG: 2 INJECTION INTRAMUSCULAR; INTRAVENOUS at 17:40

## 2023-10-26 ASSESSMENT — PAIN DESCRIPTION - LOCATION: LOCATION: CHEST

## 2023-10-26 ASSESSMENT — PAIN SCALES - GENERAL
PAINLEVEL_OUTOF10: 8
PAINLEVEL_OUTOF10: 5

## 2023-10-26 ASSESSMENT — LIFESTYLE VARIABLES
HOW MANY STANDARD DRINKS CONTAINING ALCOHOL DO YOU HAVE ON A TYPICAL DAY: 1 OR 2
HOW OFTEN DO YOU HAVE A DRINK CONTAINING ALCOHOL: MONTHLY OR LESS

## 2023-10-26 ASSESSMENT — PAIN - FUNCTIONAL ASSESSMENT: PAIN_FUNCTIONAL_ASSESSMENT: 0-10

## 2023-10-26 ASSESSMENT — PAIN DESCRIPTION - ORIENTATION: ORIENTATION: LEFT

## 2023-10-26 ASSESSMENT — PAIN DESCRIPTION - DESCRIPTORS: DESCRIPTORS: ACHING

## 2023-10-26 NOTE — ED TRIAGE NOTES
Pt arrives with complaints of chest pressure and followed up with her pcp today who suggested she come in for abnormal ekg and elevated pulse. Tachycardic on arrival. Other vitals stable.

## 2023-10-27 LAB
EKG ATRIAL RATE: 92 BPM
EKG ATRIAL RATE: 92 BPM
EKG ATRIAL RATE: 98 BPM
EKG DIAGNOSIS: NORMAL
EKG P AXIS: 48 DEGREES
EKG P AXIS: 59 DEGREES
EKG P AXIS: 59 DEGREES
EKG P-R INTERVAL: 126 MS
EKG P-R INTERVAL: 130 MS
EKG P-R INTERVAL: 132 MS
EKG Q-T INTERVAL: 366 MS
EKG Q-T INTERVAL: 372 MS
EKG Q-T INTERVAL: 384 MS
EKG QRS DURATION: 74 MS
EKG QRS DURATION: 76 MS
EKG QRS DURATION: 78 MS
EKG QTC CALCULATION (BAZETT): 460 MS
EKG QTC CALCULATION (BAZETT): 467 MS
EKG QTC CALCULATION (BAZETT): 474 MS
EKG R AXIS: 18 DEGREES
EKG R AXIS: 19 DEGREES
EKG R AXIS: 5 DEGREES
EKG T AXIS: 38 DEGREES
EKG T AXIS: 46 DEGREES
EKG T AXIS: 51 DEGREES
EKG VENTRICULAR RATE: 92 BPM
EKG VENTRICULAR RATE: 92 BPM
EKG VENTRICULAR RATE: 98 BPM

## 2023-10-27 PROCEDURE — 93010 ELECTROCARDIOGRAM REPORT: CPT | Performed by: INTERNAL MEDICINE

## 2023-10-27 NOTE — ED NOTES
Discharge instructions were reviewed with patient. All questions answered.      Esperanza Peña RN  10/26/23 4749

## 2023-10-27 NOTE — ED NOTES
Report given to [facility], No questions at this time.       Maurizio Lynch RN  10/26/23 2129

## 2023-10-27 NOTE — ED PROVIDER NOTES
PROVIDED HISTORY: Chest Discomfort TECHNOLOGIST PROVIDED HISTORY: Reason for exam:->Chest Discomfort Reason for Exam: Chest Discomfort FINDINGS:  shunt descends along the left chest wall similar to prior examination. The cardiomediastinal silhouette is stable. No focal consolidation, pleural effusion or pneumothorax. Scoliosis with degenerative changes of the spine similar to prior examination. No acute cardiopulmonary disease or significant change from prior examination      No results found. MEDICAL DECISION MAKING / ED COURSE:      PROCEDURES:   Procedures    Patient was given:  Medications   morphine (PF) injection 2 mg (2 mg IntraVENous Given 10/26/23 1739)   ondansetron (ZOFRAN) injection 4 mg (4 mg IntraVENous Given 10/26/23 1740)   iopamidol (ISOVUE-370) 76 % injection 75 mL (75 mLs IntraVENous Given 10/26/23 1832)   promethazine (PHENERGAN) in sodium chloride 0.9% 50 mL IVPB SOLN 25 mg (0 mg IntraVENous Stopped 10/26/23 2124)       CONSULTS: (Who and What was discussed)  None      Chronic Conditions affecting care: ***   has a past medical history of ADHD (attention deficit hyperactivity disorder) (1988), Depression with anxiety, Diabetes mellitus ([address]), Difficult intubation, Drug-seeking behavior, Encounter for imaging to screen for metal prior to MRI (06/01/2021), ESBL (extended spectrum beta-lactamase) producing bacteria infection (11/06/2019), Functional ovarian cysts (2008), GERD (gastroesophageal reflux disease) (When I was a kid), Headache(784.0), History of blood transfusion, History of kidney stones, History of PCOS, Hydrocephalus ([address]), Hyperlipidemia, Irritable bowel syndrome (2004), Meningitis (31/8371), Neutrophilic leukocytosis, Nicotine dependence, PONV (postoperative nausea and vomiting), Primary osteoarthritis of left knee (07/01/2016), S/P cone biopsy of cervix (2004), Scoliosis (1990.s), Seizures ([address]),  (ventriculoperitoneal) shunt status (1982), and Wears glasses. she tells me that her heart rate is always above 100. She is had tachycardia for some time. At this time I did discuss have her follow-up with cardiology. She will be discharged. She is requesting more pain medication and I informed her I would not give her anything else. She will be discharged. She is okay with this plan. Disposition Considerations (Tests not ordered but considered, Shared Decision Making, Pt Expectation of Test or Tx.):     See discussion above. The patient tolerated their visit well. I evaluated the patient. The physician was available for consultation as needed. The patient and / or the family were informed of the results of any tests, a time was given to answer questions, a plan was proposed and they agreed with plan. I am the Primary Clinician of Record. CLINICAL IMPRESSION:  1.  Tachycardia        DISPOSITION Decision To Discharge 10/26/2023 09:48:47 PM      PATIENT REFERRED TO:  Rustam Nugent, [address] [address] [phone]    Call   As needed    Rosa Antoine, [facility] [address] [place] [address]  [phone]    Call in 1 day  For follow-up for tachycardia      DISCHARGE MEDICATIONS:  New Prescriptions    No medications on file       DISCONTINUED MEDICATIONS:  Discontinued Medications    No medications on file              (Please note the MDM and HPI sections of this note were completed with a voice recognition program.  Efforts were made to edit the dictations but occasionally words are mis-transcribed.)    Electronically signed, Max Baker PA-C,          Max Baker PA-C  11/03/23 0350

## 2023-10-27 NOTE — DISCHARGE INSTRUCTIONS
Follow-up with cardiologist Dr. Mahogany Saeed for tachycardia  Follow-up your primary care provider as needed

## 2023-10-28 ASSESSMENT — ENCOUNTER SYMPTOMS
SORE THROAT: 0
SHORTNESS OF BREATH: 0
ANAL BLEEDING: 0
COUGH: 0
EYE PAIN: 0
ABDOMINAL PAIN: 0
VOMITING: 0
NAUSEA: 0
DIARRHEA: 0
BACK PAIN: 1

## 2023-11-01 ENCOUNTER — OFFICE VISIT (OUTPATIENT)
Dept: CARDIOLOGY CLINIC | Age: 43
End: 2023-11-01
Payer: COMMERCIAL

## 2023-11-01 VITALS
BODY MASS INDEX: 33.87 KG/M2 | HEIGHT: 59 IN | DIASTOLIC BLOOD PRESSURE: 82 MMHG | SYSTOLIC BLOOD PRESSURE: 132 MMHG | HEART RATE: 111 BPM | WEIGHT: 168 LBS | OXYGEN SATURATION: 98 %

## 2023-11-01 DIAGNOSIS — R94.31 ABNORMAL EKG: ICD-10-CM

## 2023-11-01 DIAGNOSIS — F17.210 CIGARETTE NICOTINE DEPENDENCE WITHOUT COMPLICATION: ICD-10-CM

## 2023-11-01 DIAGNOSIS — E78.2 MIXED HYPERLIPIDEMIA: ICD-10-CM

## 2023-11-01 DIAGNOSIS — R00.0 SINUS TACHYCARDIA: ICD-10-CM

## 2023-11-01 DIAGNOSIS — R07.89 ATYPICAL CHEST PAIN: Primary | ICD-10-CM

## 2023-11-01 LAB
D DIMER: <0.27 UG/ML FEU (ref 0–0.6)
TSH SERPL DL<=0.005 MIU/L-ACNC: 1.62 UIU/ML (ref 0.27–4.2)

## 2023-11-01 PROCEDURE — G8417 CALC BMI ABV UP PARAM F/U: HCPCS | Performed by: INTERNAL MEDICINE

## 2023-11-01 PROCEDURE — G8427 DOCREV CUR MEDS BY ELIG CLIN: HCPCS | Performed by: INTERNAL MEDICINE

## 2023-11-01 PROCEDURE — 99204 OFFICE O/P NEW MOD 45 MIN: CPT | Performed by: INTERNAL MEDICINE

## 2023-11-01 PROCEDURE — G8484 FLU IMMUNIZE NO ADMIN: HCPCS | Performed by: INTERNAL MEDICINE

## 2023-11-01 PROCEDURE — 3079F DIAST BP 80-89 MM HG: CPT | Performed by: INTERNAL MEDICINE

## 2023-11-01 PROCEDURE — 3075F SYST BP GE 130 - 139MM HG: CPT | Performed by: INTERNAL MEDICINE

## 2023-11-01 PROCEDURE — 4004F PT TOBACCO SCREEN RCVD TLK: CPT | Performed by: INTERNAL MEDICINE

## 2023-11-01 PROCEDURE — 93000 ELECTROCARDIOGRAM COMPLETE: CPT | Performed by: INTERNAL MEDICINE

## 2023-11-01 NOTE — PROGRESS NOTES
Pablo Pat MD at [facility]      multiple revisions, most recent 2015     Family History   Problem Relation Age of Onset    High Blood Pressure Mother     Diabetes Mother     High Cholesterol Mother     Depression Mother     Heart Failure Mother     COPD Mother     Cirrhosis Father     Depression Sister     Broken Bones Sister         2005    Scoliosis Sister         My other sister had spinal fusion surgery    Scoliosis Sister     Diabetes Maternal Grandmother     High Blood Pressure Maternal Grandmother     High Cholesterol Maternal Grandmother     Heart Disease Maternal Grandfather     High Blood Pressure Maternal Grandfather     Heart Disease Paternal Grandmother     Stroke Paternal Grandfather     Rheum Arthritis Neg Hx     Osteoarthritis Neg Hx     Asthma Neg Hx     Breast Cancer Neg Hx     Cancer Neg Hx     Hypertension Neg Hx     Migraines Neg Hx     Ovarian Cancer Neg Hx     Rashes/Skin Problems Neg Hx     Seizures Neg Hx     Thyroid Disease Neg Hx      Social History     Tobacco Use    Smoking status: Every Day     Packs/day: 0.25     Years: 15.00     Additional pack years: 0.00     Total pack years: 3.75     Types: Cigarettes     Start date: 10/10/2000    Smokeless tobacco: Never   Vaping Use    Vaping Use: Never used   Substance Use Topics    Alcohol use: No    Drug use: Never       Allergies   Allergen Reactions    Bee Venom Shortness Of Breath and Swelling    Bentyl [Dicyclomine Hcl] Shortness Of Breath and Anxiety    Dicyclomine Anxiety and Shortness Of Breath    Ketorolac Tromethamine Hives and Itching     Injectable only  Tolerates PO NSAIDs fine    Levofloxacin Anxiety and Hives    Maitake Anaphylaxis    Shiitake Mushroom Anaphylaxis     Can not eat mushrooms    Sulfa Antibiotics Shortness Of Breath    Vancomycin Anaphylaxis and Hives    Zosyn [Piperacillin Sod-Tazobactam So] Hives, Shortness Of Breath, Nausea Only and Dizziness or Vertigo    Adhesive Tape
rate has increased BY 37 BPM Borderline criteria for Inferior infarct are now Present Confirmed by Kenn Tavares ([id number]) on 10/25/2023 12:55:42 P    Image Review:     Assessment/Plan:     Follow up in    Thank you very much for allowing me to participate in the care of your patient. Please do not hesitate to contact me if you have any questions. Sincerely,  Irene Sharpe.  Aracelis Mishra, [address] [address], [address] [facility] [address] [place], [address] [place] [address]  Ph: [phone]  Fax: [phone]

## 2023-11-02 ENCOUNTER — OFFICE VISIT (OUTPATIENT)
Dept: INTERNAL MEDICINE CLINIC | Age: 43
End: 2023-11-02
Payer: COMMERCIAL

## 2023-11-02 VITALS
SYSTOLIC BLOOD PRESSURE: 126 MMHG | HEART RATE: 90 BPM | TEMPERATURE: 98.3 F | OXYGEN SATURATION: 99 % | BODY MASS INDEX: 33.53 KG/M2 | WEIGHT: 166 LBS | DIASTOLIC BLOOD PRESSURE: 80 MMHG

## 2023-11-02 DIAGNOSIS — R42 DIZZINESS: Primary | ICD-10-CM

## 2023-11-02 DIAGNOSIS — R55 SYNCOPE, UNSPECIFIED SYNCOPE TYPE: ICD-10-CM

## 2023-11-02 DIAGNOSIS — R07.9 CHEST PAIN, UNSPECIFIED TYPE: ICD-10-CM

## 2023-11-02 DIAGNOSIS — R00.0 TACHYCARDIA: ICD-10-CM

## 2023-11-02 PROCEDURE — G8427 DOCREV CUR MEDS BY ELIG CLIN: HCPCS | Performed by: NURSE PRACTITIONER

## 2023-11-02 PROCEDURE — 4004F PT TOBACCO SCREEN RCVD TLK: CPT | Performed by: NURSE PRACTITIONER

## 2023-11-02 PROCEDURE — 3079F DIAST BP 80-89 MM HG: CPT | Performed by: NURSE PRACTITIONER

## 2023-11-02 PROCEDURE — 3074F SYST BP LT 130 MM HG: CPT | Performed by: NURSE PRACTITIONER

## 2023-11-02 PROCEDURE — 99213 OFFICE O/P EST LOW 20 MIN: CPT | Performed by: NURSE PRACTITIONER

## 2023-11-02 PROCEDURE — G8484 FLU IMMUNIZE NO ADMIN: HCPCS | Performed by: NURSE PRACTITIONER

## 2023-11-02 PROCEDURE — G8417 CALC BMI ABV UP PARAM F/U: HCPCS | Performed by: NURSE PRACTITIONER

## 2023-11-02 ASSESSMENT — ENCOUNTER SYMPTOMS: BACK PAIN: 1

## 2023-11-02 NOTE — PROGRESS NOTES
syncope type: She is having associated tachycardia and dizziness, will be getting holter monitor per cardiology tomorrow. She endorses an episode of syncope today, will obtain STAT head CT. She was advised to go to ER if she has any further episodes. - CT HEAD WO CONTRAST; Future    3. Tachycardia: etiology unclear. Seeing cardiology, holter monitor arranged for tomorrow. 4. Chest pain, unspecified type: seeing cardiology, felt to be non cardiac      Orders Placed This Encounter   Procedures    CT HEAD WO CONTRAST     Standing Status:   Future     Standing Expiration Date:   11/2/2024     Order Specific Question:   Reason for exam:     Answer:   syncope       Return if symptoms worsen or fail to improve. OR sooner with questions, concerns, worsening symptoms    JULES NORIEGA, FREDRICK  11/2/2023  4:18 PM    Discussed use, benefit, and side effects of prescribed medications. Barriers to medication compliance addressed. Discussed all ordered testing and labs. All patient questions answered. Patient agreeable with plan above. Please note that this chart was generated using dragon dictation software. Although every effort was made to ensure the accuracy of this automated transcription, some errors in transcription may have occurred.

## 2023-11-03 ENCOUNTER — HOSPITAL ENCOUNTER (INPATIENT)
Age: 43
LOS: 7 days | Discharge: HOME | End: 2023-11-10
Attending: EMERGENCY MEDICINE | Admitting: FAMILY MEDICINE
Payer: COMMERCIAL

## 2023-11-03 ENCOUNTER — APPOINTMENT (OUTPATIENT)
Dept: CT IMAGING | Age: 43
End: 2023-11-03
Payer: COMMERCIAL

## 2023-11-03 ENCOUNTER — APPOINTMENT (OUTPATIENT)
Dept: GENERAL RADIOLOGY | Age: 43
End: 2023-11-03
Payer: COMMERCIAL

## 2023-11-03 DIAGNOSIS — R00.0 TACHYCARDIA: ICD-10-CM

## 2023-11-03 DIAGNOSIS — D72.829 LEUKOCYTOSIS, UNSPECIFIED TYPE: ICD-10-CM

## 2023-11-03 DIAGNOSIS — A41.9 SEVERE SEPSIS (HCC): ICD-10-CM

## 2023-11-03 DIAGNOSIS — R10.9 ABDOMINAL PAIN IN FEMALE: Primary | ICD-10-CM

## 2023-11-03 DIAGNOSIS — R10.84 GENERALIZED ABDOMINAL PAIN: ICD-10-CM

## 2023-11-03 DIAGNOSIS — R65.20 SEVERE SEPSIS (HCC): ICD-10-CM

## 2023-11-03 PROBLEM — E11.65 HYPERGLYCEMIA DUE TO TYPE 2 DIABETES MELLITUS (HCC): Status: ACTIVE | Noted: 2023-11-03

## 2023-11-03 PROBLEM — E66.9 OBESITY WITH SERIOUS COMORBIDITY: Status: ACTIVE | Noted: 2023-11-03

## 2023-11-03 PROBLEM — R65.10 SIRS (SYSTEMIC INFLAMMATORY RESPONSE SYNDROME) (HCC): Status: ACTIVE | Noted: 2023-11-03

## 2023-11-03 PROBLEM — Z98.2 S/P VP SHUNT: Status: ACTIVE | Noted: 2023-11-03

## 2023-11-03 LAB
ALBUMIN SERPL-MCNC: 4.5 G/DL (ref 3.4–5)
ALBUMIN/GLOB SERPL: 1.3 {RATIO} (ref 1.1–2.2)
ALP SERPL-CCNC: 105 U/L (ref 40–129)
ALT SERPL-CCNC: 16 U/L (ref 10–40)
ANION GAP SERPL CALCULATED.3IONS-SCNC: 16 MMOL/L (ref 3–16)
AST SERPL-CCNC: 12 U/L (ref 15–37)
BASOPHILS # BLD: 0 K/UL (ref 0–0.2)
BASOPHILS NFR BLD: 0.2 %
BILIRUB SERPL-MCNC: <0.2 MG/DL (ref 0–1)
BILIRUB UR QL STRIP.AUTO: NEGATIVE
BUN SERPL-MCNC: 14 MG/DL (ref 7–20)
CALCIUM SERPL-MCNC: 9.7 MG/DL (ref 8.3–10.6)
CHLORIDE SERPL-SCNC: 102 MMOL/L (ref 99–110)
CLARITY UR: CLEAR
CO2 SERPL-SCNC: 21 MMOL/L (ref 21–32)
COLOR UR: YELLOW
CREAT SERPL-MCNC: 0.7 MG/DL (ref 0.6–1.1)
CRP SERPL-MCNC: <3 MG/L (ref 0–5.1)
DEPRECATED RDW RBC AUTO: 14.2 % (ref 12.4–15.4)
EKG ATRIAL RATE: 118 BPM
EKG DIAGNOSIS: NORMAL
EKG P AXIS: 57 DEGREES
EKG P-R INTERVAL: 132 MS
EKG Q-T INTERVAL: 332 MS
EKG QRS DURATION: 76 MS
EKG QTC CALCULATION (BAZETT): 465 MS
EKG R AXIS: 19 DEGREES
EKG T AXIS: 59 DEGREES
EKG VENTRICULAR RATE: 118 BPM
EOSINOPHIL # BLD: 0 K/UL (ref 0–0.6)
EOSINOPHIL NFR BLD: 0.2 %
ERYTHROCYTE [SEDIMENTATION RATE] IN BLOOD BY WESTERGREN METHOD: 15 MM/HR (ref 0–20)
FLUAV RNA UPPER RESP QL NAA+PROBE: NEGATIVE
FLUBV AG NPH QL: NEGATIVE
GFR SERPLBLD CREATININE-BSD FMLA CKD-EPI: >60 ML/MIN/{1.73_M2}
GLUCOSE SERPL-MCNC: 199 MG/DL (ref 70–99)
GLUCOSE UR STRIP.AUTO-MCNC: NEGATIVE MG/DL
HCT VFR BLD AUTO: 45.9 % (ref 36–48)
HGB BLD-MCNC: 15.4 G/DL (ref 12–16)
HGB UR QL STRIP.AUTO: NEGATIVE
KETONES UR STRIP.AUTO-MCNC: NEGATIVE MG/DL
LACTATE BLDV-SCNC: 2.6 MMOL/L (ref 0.4–1.9)
LACTATE BLDV-SCNC: 2.7 MMOL/L (ref 0.4–2)
LEUKOCYTE ESTERASE UR QL STRIP.AUTO: NEGATIVE
LIPASE SERPL-CCNC: 17 U/L (ref 13–60)
LYMPHOCYTES # BLD: 1.2 K/UL (ref 1–5.1)
LYMPHOCYTES NFR BLD: 6.4 %
MCH RBC QN AUTO: 28.7 PG (ref 26–34)
MCHC RBC AUTO-ENTMCNC: 33.5 G/DL (ref 31–36)
MCV RBC AUTO: 85.7 FL (ref 80–100)
MONOCYTES # BLD: 0.1 K/UL (ref 0–1.3)
MONOCYTES NFR BLD: 0.6 %
NEUTROPHILS # BLD: 17.5 K/UL (ref 1.7–7.7)
NEUTROPHILS NFR BLD: 92.6 %
NITRITE UR QL STRIP.AUTO: NEGATIVE
NT-PROBNP SERPL-MCNC: 52 PG/ML (ref 0–124)
PH UR STRIP.AUTO: 5.5 [PH] (ref 5–8)
PLATELET # BLD AUTO: 297 K/UL (ref 135–450)
PMV BLD AUTO: 8 FL (ref 5–10.5)
POTASSIUM SERPL-SCNC: 3.6 MMOL/L (ref 3.5–5.1)
PROCALCITONIN SERPL IA-MCNC: 0.03 NG/ML (ref 0–0.15)
PROT SERPL-MCNC: 8 G/DL (ref 6.4–8.2)
PROT UR STRIP.AUTO-MCNC: NEGATIVE MG/DL
RBC # BLD AUTO: 5.36 M/UL (ref 4–5.2)
SARS-COV-2 RDRP RESP QL NAA+PROBE: NOT DETECTED
SODIUM SERPL-SCNC: 139 MMOL/L (ref 136–145)
SP GR UR STRIP.AUTO: 1.01 (ref 1–1.03)
TROPONIN, HIGH SENSITIVITY: <6 NG/L (ref 0–14)
TROPONIN, HIGH SENSITIVITY: <6 NG/L (ref 0–14)
UA COMPLETE W REFLEX CULTURE PNL UR: NORMAL
UA DIPSTICK W REFLEX MICRO PNL UR: NORMAL
URN SPEC COLLECT METH UR: NORMAL
UROBILINOGEN UR STRIP-ACNC: 0.2 E.U./DL
WBC # BLD AUTO: 18.9 K/UL (ref 4–11)

## 2023-11-03 PROCEDURE — 84484 ASSAY OF TROPONIN QUANT: CPT

## 2023-11-03 PROCEDURE — 87040 BLOOD CULTURE FOR BACTERIA: CPT

## 2023-11-03 PROCEDURE — 83880 ASSAY OF NATRIURETIC PEPTIDE: CPT

## 2023-11-03 PROCEDURE — 6360000004 HC RX CONTRAST MEDICATION: Performed by: EMERGENCY MEDICINE

## 2023-11-03 PROCEDURE — 85025 COMPLETE CBC W/AUTO DIFF WBC: CPT

## 2023-11-03 PROCEDURE — 70250 X-RAY EXAM OF SKULL: CPT

## 2023-11-03 PROCEDURE — 71046 X-RAY EXAM CHEST 2 VIEWS: CPT

## 2023-11-03 PROCEDURE — 36415 COLL VENOUS BLD VENIPUNCTURE: CPT

## 2023-11-03 PROCEDURE — 6370000000 HC RX 637 (ALT 250 FOR IP): Performed by: PHYSICIAN ASSISTANT

## 2023-11-03 PROCEDURE — 86140 C-REACTIVE PROTEIN: CPT

## 2023-11-03 PROCEDURE — 93010 ELECTROCARDIOGRAM REPORT: CPT | Performed by: INTERNAL MEDICINE

## 2023-11-03 PROCEDURE — 81003 URINALYSIS AUTO W/O SCOPE: CPT

## 2023-11-03 PROCEDURE — 6360000002 HC RX W HCPCS: Performed by: PHYSICIAN ASSISTANT

## 2023-11-03 PROCEDURE — 87635 SARS-COV-2 COVID-19 AMP PRB: CPT

## 2023-11-03 PROCEDURE — 83605 ASSAY OF LACTIC ACID: CPT

## 2023-11-03 PROCEDURE — 96375 TX/PRO/DX INJ NEW DRUG ADDON: CPT

## 2023-11-03 PROCEDURE — 2580000003 HC RX 258: Performed by: EMERGENCY MEDICINE

## 2023-11-03 PROCEDURE — 87804 INFLUENZA ASSAY W/OPTIC: CPT

## 2023-11-03 PROCEDURE — 83690 ASSAY OF LIPASE: CPT

## 2023-11-03 PROCEDURE — 99285 EMERGENCY DEPT VISIT HI MDM: CPT

## 2023-11-03 PROCEDURE — 6370000000 HC RX 637 (ALT 250 FOR IP): Performed by: FAMILY MEDICINE

## 2023-11-03 PROCEDURE — 96367 TX/PROPH/DG ADDL SEQ IV INF: CPT

## 2023-11-03 PROCEDURE — 96365 THER/PROPH/DIAG IV INF INIT: CPT

## 2023-11-03 PROCEDURE — 93005 ELECTROCARDIOGRAM TRACING: CPT | Performed by: PHYSICIAN ASSISTANT

## 2023-11-03 PROCEDURE — 96361 HYDRATE IV INFUSION ADD-ON: CPT

## 2023-11-03 PROCEDURE — 84145 PROCALCITONIN (PCT): CPT

## 2023-11-03 PROCEDURE — 2580000003 HC RX 258: Performed by: PHYSICIAN ASSISTANT

## 2023-11-03 PROCEDURE — 70450 CT HEAD/BRAIN W/O DYE: CPT

## 2023-11-03 PROCEDURE — 1200000000 HC SEMI PRIVATE

## 2023-11-03 PROCEDURE — 74177 CT ABD & PELVIS W/CONTRAST: CPT

## 2023-11-03 PROCEDURE — 80053 COMPREHEN METABOLIC PANEL: CPT

## 2023-11-03 PROCEDURE — 6360000002 HC RX W HCPCS: Performed by: EMERGENCY MEDICINE

## 2023-11-03 PROCEDURE — 85652 RBC SED RATE AUTOMATED: CPT

## 2023-11-03 RX ORDER — 0.9 % SODIUM CHLORIDE 0.9 %
500 INTRAVENOUS SOLUTION INTRAVENOUS ONCE
Status: COMPLETED | OUTPATIENT
Start: 2023-11-03 | End: 2023-11-03

## 2023-11-03 RX ORDER — SODIUM CHLORIDE 0.9 % (FLUSH) 0.9 %
5-40 SYRINGE (ML) INJECTION EVERY 12 HOURS SCHEDULED
Status: DISCONTINUED | OUTPATIENT
Start: 2023-11-03 | End: 2023-11-10 | Stop reason: HOSPADM

## 2023-11-03 RX ORDER — GABAPENTIN 400 MG/1
400 CAPSULE ORAL 2 TIMES DAILY
Status: DISCONTINUED | OUTPATIENT
Start: 2023-11-03 | End: 2023-11-10 | Stop reason: HOSPADM

## 2023-11-03 RX ORDER — LAMOTRIGINE 100 MG/1
100 TABLET ORAL DAILY
Status: DISCONTINUED | OUTPATIENT
Start: 2023-11-04 | End: 2023-11-10 | Stop reason: HOSPADM

## 2023-11-03 RX ORDER — 0.9 % SODIUM CHLORIDE 0.9 %
1000 INTRAVENOUS SOLUTION INTRAVENOUS ONCE
Status: COMPLETED | OUTPATIENT
Start: 2023-11-03 | End: 2023-11-03

## 2023-11-03 RX ORDER — INSULIN LISPRO 100 [IU]/ML
0-8 INJECTION, SOLUTION INTRAVENOUS; SUBCUTANEOUS
Status: DISCONTINUED | OUTPATIENT
Start: 2023-11-04 | End: 2023-11-10 | Stop reason: HOSPADM

## 2023-11-03 RX ORDER — DIPHENHYDRAMINE HCL 25 MG
25 TABLET ORAL EVERY 6 HOURS PRN
Status: COMPLETED | OUTPATIENT
Start: 2023-11-03 | End: 2023-11-04

## 2023-11-03 RX ORDER — SODIUM CHLORIDE 0.9 % (FLUSH) 0.9 %
5-40 SYRINGE (ML) INJECTION PRN
Status: DISCONTINUED | OUTPATIENT
Start: 2023-11-03 | End: 2023-11-10 | Stop reason: HOSPADM

## 2023-11-03 RX ORDER — OXYCODONE HYDROCHLORIDE 5 MG/1
5 TABLET ORAL EVERY 4 HOURS PRN
Status: DISCONTINUED | OUTPATIENT
Start: 2023-11-03 | End: 2023-11-04

## 2023-11-03 RX ORDER — LAMOTRIGINE 25 MG/1
25 TABLET ORAL 2 TIMES DAILY
Status: DISCONTINUED | OUTPATIENT
Start: 2023-11-03 | End: 2023-11-03

## 2023-11-03 RX ORDER — DEXTROSE MONOHYDRATE 100 MG/ML
INJECTION, SOLUTION INTRAVENOUS CONTINUOUS PRN
Status: DISCONTINUED | OUTPATIENT
Start: 2023-11-03 | End: 2023-11-10 | Stop reason: HOSPADM

## 2023-11-03 RX ORDER — SODIUM CHLORIDE, SODIUM LACTATE, POTASSIUM CHLORIDE, CALCIUM CHLORIDE 600; 310; 30; 20 MG/100ML; MG/100ML; MG/100ML; MG/100ML
INJECTION, SOLUTION INTRAVENOUS CONTINUOUS
Status: ACTIVE | OUTPATIENT
Start: 2023-11-03 | End: 2023-11-04

## 2023-11-03 RX ORDER — OXYCODONE HYDROCHLORIDE 5 MG/1
5 TABLET ORAL ONCE
Status: COMPLETED | OUTPATIENT
Start: 2023-11-03 | End: 2023-11-03

## 2023-11-03 RX ORDER — ATORVASTATIN CALCIUM 10 MG/1
10 TABLET, FILM COATED ORAL NIGHTLY
Status: DISCONTINUED | OUTPATIENT
Start: 2023-11-03 | End: 2023-11-10 | Stop reason: HOSPADM

## 2023-11-03 RX ORDER — ONDANSETRON 2 MG/ML
4 INJECTION INTRAMUSCULAR; INTRAVENOUS ONCE
Status: COMPLETED | OUTPATIENT
Start: 2023-11-03 | End: 2023-11-03

## 2023-11-03 RX ORDER — SODIUM CHLORIDE 9 MG/ML
INJECTION, SOLUTION INTRAVENOUS PRN
Status: DISCONTINUED | OUTPATIENT
Start: 2023-11-03 | End: 2023-11-06 | Stop reason: SDUPTHER

## 2023-11-03 RX ORDER — MAGNESIUM HYDROXIDE/ALUMINUM HYDROXICE/SIMETHICONE 120; 1200; 1200 MG/30ML; MG/30ML; MG/30ML
30 SUSPENSION ORAL EVERY 6 HOURS PRN
Status: DISCONTINUED | OUTPATIENT
Start: 2023-11-03 | End: 2023-11-10 | Stop reason: HOSPADM

## 2023-11-03 RX ORDER — INSULIN LISPRO 100 [IU]/ML
0-4 INJECTION, SOLUTION INTRAVENOUS; SUBCUTANEOUS NIGHTLY
Status: DISCONTINUED | OUTPATIENT
Start: 2023-11-03 | End: 2023-11-10 | Stop reason: HOSPADM

## 2023-11-03 RX ORDER — ENOXAPARIN SODIUM 100 MG/ML
40 INJECTION SUBCUTANEOUS DAILY
Status: DISCONTINUED | OUTPATIENT
Start: 2023-11-04 | End: 2023-11-10 | Stop reason: HOSPADM

## 2023-11-03 RX ADMIN — ONDANSETRON 4 MG: 2 INJECTION INTRAMUSCULAR; INTRAVENOUS at 16:13

## 2023-11-03 RX ADMIN — HYDROMORPHONE HYDROCHLORIDE 0.25 MG: 1 INJECTION, SOLUTION INTRAMUSCULAR; INTRAVENOUS; SUBCUTANEOUS at 20:04

## 2023-11-03 RX ADMIN — SODIUM CHLORIDE 1000 ML: 9 INJECTION, SOLUTION INTRAVENOUS at 15:45

## 2023-11-03 RX ADMIN — HYDROMORPHONE HYDROCHLORIDE 0.5 MG: 1 INJECTION, SOLUTION INTRAMUSCULAR; INTRAVENOUS; SUBCUTANEOUS at 17:47

## 2023-11-03 RX ADMIN — Medication 25 MG: at 18:28

## 2023-11-03 RX ADMIN — SODIUM CHLORIDE 500 ML: 9 INJECTION, SOLUTION INTRAVENOUS at 20:04

## 2023-11-03 RX ADMIN — DIPHENHYDRAMINE HCL 25 MG: 25 TABLET ORAL at 21:39

## 2023-11-03 RX ADMIN — CEFEPIME 2000 MG: 2 INJECTION, POWDER, FOR SOLUTION INTRAVENOUS at 17:46

## 2023-11-03 RX ADMIN — OXYCODONE HYDROCHLORIDE 5 MG: 5 TABLET ORAL at 16:14

## 2023-11-03 RX ADMIN — IOPAMIDOL 75 ML: 755 INJECTION, SOLUTION INTRAVENOUS at 18:00

## 2023-11-03 ASSESSMENT — ENCOUNTER SYMPTOMS
SHORTNESS OF BREATH: 0
SORE THROAT: 0
EYE PAIN: 0
COUGH: 0
NAUSEA: 0
BACK PAIN: 0
ABDOMINAL PAIN: 0
VOMITING: 0

## 2023-11-03 ASSESSMENT — PAIN SCALES - GENERAL
PAINLEVEL_OUTOF10: 8
PAINLEVEL_OUTOF10: 9
PAINLEVEL_OUTOF10: 8

## 2023-11-03 NOTE — ED PROVIDER NOTES
[address] [facility] [address] [address]        Pt Name: Zaid Barrera  MRN: [id number]  [address] [facility] [place] 1980  Date of evaluation: 11/3/2023  Provider: CRISPIN Solis  PCP: FREDRICK Garibay  Note Started: 3:11 PM EDT 11/3/23       I have seen and evaluated this patient with my supervising physician Ivy Obrien, [address] [place] [address] [address]       Chief Complaint   Patient presents with    Tachycardia     States heart rate via Apple watch was 130s resting, passed out once yesterday and once today. Fall    Headache    Dizziness       HISTORY OF PRESENT ILLNESS: 1 or more Elements     History From: patient     Zaid Barrera is a 41 y.o. female who presents for multiple complaints. She reports palpations and tachycardia intermittently for the past 2 weeks. Has seen Dr. Alberto Duenas of Cardiology for th is in the past and he ordered a monitor for her but she has not gotten it yet. She also reports chest pressure and shortness of breath for the past 2 weeks. Reports Dr. Alberto Duenas did blood work recently that ruled out a blood clot and thyroid abnormalities. Reports her heart rate was 130s overnight. She wears her apple watch all the time because she is worried about her heart rate. She also reports a little bit of pressure on the left side of her head where her shunt is. This shunt has been in place for 3 years. Denies having a headache though. Also reports some right upper quadrant abdominal pain \"where the end of her shunt is\". Has nausea but no vomiting or diarrhea. Denies dysuria. Also reports fever 102.3 F this morning. Denies hx of VTE or MI.      Nursing Notes were reviewed and agreed with or any disagreements were addressed in the HPI. REVIEW OF SYSTEMS :      Review of Systems    Positives and Pertinent negatives as per HPI.      SURGICAL HISTORY     Past Surgical History:   Procedure Laterality Date    ABDOMEN SURGERY N/A 07/14/2021    REMOVAL

## 2023-11-03 NOTE — ED PROVIDER NOTES
ED Attending Attestation Note    This patient was seen by the advanced practice provider. I personally saw the patient and performed a substantive portion of the visit including all aspects of the medical decision making. Briefly, 41 y.o. female who  has a past medical history of ADHD (attention deficit hyperactivity disorder), Depression with anxiety, Difficult intubation, Drug-seeking behavior, Encounter for imaging to screen for metal prior to MRI, ESBL (extended spectrum beta-lactamase) producing bacteria infection, Functional ovarian cysts, GERD (gastroesophageal reflux disease), Headache(784.0), History of blood transfusion, History of kidney stones, History of PCOS, Hydrocephalus ([address]), Hyperlipidemia, Irritable bowel syndrome, Meningitis, Neutrophilic leukocytosis, Nicotine dependence, PONV (postoperative nausea and vomiting), Prediabetes, Primary osteoarthritis of left knee, S/P cone biopsy of cervix, Scoliosis, Seizures ([address]),  (ventriculoperitoneal) shunt status, and Wears glasses. presents with complaints of tachycardia as been present over the past 2 days. Patient states that her resting heart rate yesterday was 130. States that she had an episode she went from sitting to standing and had an episode of syncope yesterday. Patient denies any associated chest pain or shortness of breath. Denies any fevers or chills. Denies any headache or vision changes. No neck pain or stiffness. States she does have some abdominal pain which is chronic for her. Has a history of a  shunt and states she will occasionally have abdominal pain where the shunt sits in her abdomen. Denies any dysuria increasing frequency of urination. Normal bowel movements. Focused exam:   Gen: 41 y.o. female, NAD  HEENT: NCAT. PERRL. EOMI. CV: Tachycardic, regular rhythm w/o MRG  Lungs: CTAB. No incr WOB. Abdomen: Soft, nontender, nondistended. No rebound/guarding.    MSK: No lower extremity swelling or calf (!) 119/98 95 19 99 %   11/03/23 1915 137/84 95 27 97 %      Recent Labs     11/03/23  1513   WBC 18.9*   CREATININE 0.7   BILITOT <0.2            Time Severe Sepsis Identified: 1650    Fluid Resuscitation Rational: at least 30mL/kg based on ideal body weight due to obesity defined as BMI >30 (patient's BMI is There is no height or weight on file to calculate BMI. and IBW is Ideal body weight: 48.8 kg (107 lb 8.4 oz)Adjusted ideal body weight: 59.4 kg (130 lb 14.6 oz))      Repeat lactate level: ordered and pending at this time    Reassessment Exam:   Not applicable. Patient does not have septic shock. MDM:   Patient as above. Seen and evaluated. Presents for tachycardia and abdominal pain. Also had an episode of syncope yesterday. Patient has sinus tachycardia on EKG but no ischemic changes. Flu and COVID-negative. Troponin less than 6. BNP 52. Metabolic panel without electrolyte disturbances. Glucose 199. Creatinine 0.7 BUN of 14. Normal liver enzymes. Patient does have leukocytosis at 18.9 with a left shift. Hemoglobin stable 15.4. Platelets 308. Urinalysis negative for infection. Chest x-ray reviewed by myself no acute cardiopulmonary abnormality. Patient's  shunt series shows intact shunt without any evidence of fracture. CT abdomen pelvis with no acute abnormality. Shows stable positioning of her  shunt. Patient does meet criteria for severe sepsis with her tachycardia, leukocytosis and elevated lactic acid. Recommended admission for broad-spectrum antibiotics and further evaluation of her severe sepsis. Patient agreeable. Consult was to hospitalist team for admission. 8:45 PM EDT  Spoke with Dr. Marilu Beasley, hospitalist, to discuss patient's care plan and admission. He does agree the patient needs admission but is concerned with her history of a  shunt and her sepsis of unknown origin that she may need to be in the hospital where neurosurgery is available.   Discussed

## 2023-11-04 LAB
ALBUMIN SERPL-MCNC: 3.7 G/DL (ref 3.4–5)
ALBUMIN/GLOB SERPL: 1.6 {RATIO} (ref 1.1–2.2)
ALP SERPL-CCNC: 81 U/L (ref 40–129)
ALT SERPL-CCNC: 11 U/L (ref 10–40)
ANION GAP SERPL CALCULATED.3IONS-SCNC: 8 MMOL/L (ref 3–16)
AST SERPL-CCNC: 8 U/L (ref 15–37)
BASOPHILS # BLD: 0.1 K/UL (ref 0–0.2)
BASOPHILS NFR BLD: 0.4 %
BILIRUB SERPL-MCNC: <0.2 MG/DL (ref 0–1)
BUN SERPL-MCNC: 15 MG/DL (ref 7–20)
CALCIUM SERPL-MCNC: 8.7 MG/DL (ref 8.3–10.6)
CHLORIDE SERPL-SCNC: 105 MMOL/L (ref 99–110)
CO2 SERPL-SCNC: 26 MMOL/L (ref 21–32)
CREAT SERPL-MCNC: 0.8 MG/DL (ref 0.6–1.1)
DEPRECATED RDW RBC AUTO: 14.3 % (ref 12.4–15.4)
EOSINOPHIL # BLD: 0 K/UL (ref 0–0.6)
EOSINOPHIL NFR BLD: 0 %
EST. AVERAGE GLUCOSE BLD GHB EST-MCNC: 131.2 MG/DL
GFR SERPLBLD CREATININE-BSD FMLA CKD-EPI: >60 ML/MIN/{1.73_M2}
GLUCOSE BLD-MCNC: 126 MG/DL (ref 70–99)
GLUCOSE BLD-MCNC: 177 MG/DL (ref 70–99)
GLUCOSE BLD-MCNC: 182 MG/DL (ref 70–99)
GLUCOSE BLD-MCNC: 220 MG/DL (ref 70–99)
GLUCOSE SERPL-MCNC: 257 MG/DL (ref 70–99)
HBA1C MFR BLD: 6.2 %
HCT VFR BLD AUTO: 40 % (ref 36–48)
HGB BLD-MCNC: 13.5 G/DL (ref 12–16)
LACTATE BLDV-SCNC: 1.9 MMOL/L (ref 0.4–1.9)
LYMPHOCYTES # BLD: 1.4 K/UL (ref 1–5.1)
LYMPHOCYTES NFR BLD: 8.4 %
MCH RBC QN AUTO: 28.5 PG (ref 26–34)
MCHC RBC AUTO-ENTMCNC: 33.7 G/DL (ref 31–36)
MCV RBC AUTO: 84.7 FL (ref 80–100)
MONOCYTES # BLD: 0.4 K/UL (ref 0–1.3)
MONOCYTES NFR BLD: 2.2 %
NEUTROPHILS # BLD: 15 K/UL (ref 1.7–7.7)
NEUTROPHILS NFR BLD: 89 %
PERFORMED ON: ABNORMAL
PLATELET # BLD AUTO: 262 K/UL (ref 135–450)
PMV BLD AUTO: 8.2 FL (ref 5–10.5)
POTASSIUM SERPL-SCNC: 4 MMOL/L (ref 3.5–5.1)
PROT SERPL-MCNC: 6 G/DL (ref 6.4–8.2)
RBC # BLD AUTO: 4.72 M/UL (ref 4–5.2)
REASON FOR REJECTION: NORMAL
REJECTED TEST: NORMAL
SODIUM SERPL-SCNC: 139 MMOL/L (ref 136–145)
WBC # BLD AUTO: 16.8 K/UL (ref 4–11)

## 2023-11-04 PROCEDURE — 83605 ASSAY OF LACTIC ACID: CPT

## 2023-11-04 PROCEDURE — 6370000000 HC RX 637 (ALT 250 FOR IP): Performed by: REGISTERED NURSE

## 2023-11-04 PROCEDURE — 6360000002 HC RX W HCPCS: Performed by: STUDENT IN AN ORGANIZED HEALTH CARE EDUCATION/TRAINING PROGRAM

## 2023-11-04 PROCEDURE — 85025 COMPLETE CBC W/AUTO DIFF WBC: CPT

## 2023-11-04 PROCEDURE — 36415 COLL VENOUS BLD VENIPUNCTURE: CPT

## 2023-11-04 PROCEDURE — 6370000000 HC RX 637 (ALT 250 FOR IP): Performed by: FAMILY MEDICINE

## 2023-11-04 PROCEDURE — 1200000000 HC SEMI PRIVATE

## 2023-11-04 PROCEDURE — 2580000003 HC RX 258: Performed by: FAMILY MEDICINE

## 2023-11-04 PROCEDURE — 94760 N-INVAS EAR/PLS OXIMETRY 1: CPT

## 2023-11-04 PROCEDURE — 6370000000 HC RX 637 (ALT 250 FOR IP): Performed by: STUDENT IN AN ORGANIZED HEALTH CARE EDUCATION/TRAINING PROGRAM

## 2023-11-04 PROCEDURE — 80053 COMPREHEN METABOLIC PANEL: CPT

## 2023-11-04 PROCEDURE — 2580000003 HC RX 258: Performed by: REGISTERED NURSE

## 2023-11-04 PROCEDURE — 6360000002 HC RX W HCPCS: Performed by: FAMILY MEDICINE

## 2023-11-04 PROCEDURE — 99254 IP/OBS CNSLTJ NEW/EST MOD 60: CPT | Performed by: SURGERY

## 2023-11-04 PROCEDURE — 83036 HEMOGLOBIN GLYCOSYLATED A1C: CPT

## 2023-11-04 PROCEDURE — 2500000003 HC RX 250 WO HCPCS: Performed by: REGISTERED NURSE

## 2023-11-04 RX ORDER — OXYCODONE HYDROCHLORIDE 5 MG/1
5 TABLET ORAL EVERY 4 HOURS PRN
Status: DISCONTINUED | OUTPATIENT
Start: 2023-11-04 | End: 2023-11-04

## 2023-11-04 RX ORDER — LORAZEPAM 1 MG/1
1 TABLET ORAL EVERY 12 HOURS PRN
COMMUNITY

## 2023-11-04 RX ORDER — ONDANSETRON 4 MG/1
4 TABLET, FILM COATED ORAL EVERY 8 HOURS PRN
Status: DISCONTINUED | OUTPATIENT
Start: 2023-11-04 | End: 2023-11-10 | Stop reason: HOSPADM

## 2023-11-04 RX ORDER — DIPHENHYDRAMINE HCL 25 MG
50 TABLET ORAL EVERY 6 HOURS PRN
Status: DISCONTINUED | OUTPATIENT
Start: 2023-11-04 | End: 2023-11-05

## 2023-11-04 RX ORDER — OXYCODONE HYDROCHLORIDE 10 MG/1
10 TABLET ORAL EVERY 4 HOURS PRN
Status: DISCONTINUED | OUTPATIENT
Start: 2023-11-04 | End: 2023-11-04

## 2023-11-04 RX ORDER — DIPHENHYDRAMINE HCL 25 MG
25 TABLET ORAL ONCE AS NEEDED
Status: COMPLETED | OUTPATIENT
Start: 2023-11-04 | End: 2023-11-04

## 2023-11-04 RX ADMIN — CEFEPIME 2000 MG: 2 INJECTION, POWDER, FOR SOLUTION INTRAVENOUS at 03:46

## 2023-11-04 RX ADMIN — ATORVASTATIN CALCIUM 10 MG: 10 TABLET, FILM COATED ORAL at 00:09

## 2023-11-04 RX ADMIN — DIPHENHYDRAMINE HCL 25 MG: 25 TABLET ORAL at 14:04

## 2023-11-04 RX ADMIN — GABAPENTIN 400 MG: 400 CAPSULE ORAL at 20:29

## 2023-11-04 RX ADMIN — ENOXAPARIN SODIUM 40 MG: 100 INJECTION SUBCUTANEOUS at 11:23

## 2023-11-04 RX ADMIN — HYDROMORPHONE HYDROCHLORIDE 0.5 MG: 1 INJECTION, SOLUTION INTRAMUSCULAR; INTRAVENOUS; SUBCUTANEOUS at 13:39

## 2023-11-04 RX ADMIN — CEFEPIME 2000 MG: 2 INJECTION, POWDER, FOR SOLUTION INTRAVENOUS at 11:30

## 2023-11-04 RX ADMIN — DIPHENHYDRAMINE HCL 25 MG: 25 TABLET ORAL at 21:21

## 2023-11-04 RX ADMIN — DIPHENHYDRAMINE HCL 25 MG: 25 TABLET ORAL at 04:51

## 2023-11-04 RX ADMIN — HYDROMORPHONE HYDROCHLORIDE 0.5 MG: 1 INJECTION, SOLUTION INTRAMUSCULAR; INTRAVENOUS; SUBCUTANEOUS at 17:50

## 2023-11-04 RX ADMIN — INSULIN LISPRO 2 UNITS: 100 INJECTION, SOLUTION INTRAVENOUS; SUBCUTANEOUS at 11:45

## 2023-11-04 RX ADMIN — OXYCODONE HYDROCHLORIDE 10 MG: 10 TABLET ORAL at 09:28

## 2023-11-04 RX ADMIN — LAMOTRIGINE 100 MG: 100 TABLET ORAL at 11:22

## 2023-11-04 RX ADMIN — ATORVASTATIN CALCIUM 10 MG: 10 TABLET, FILM COATED ORAL at 20:29

## 2023-11-04 RX ADMIN — GABAPENTIN 400 MG: 400 CAPSULE ORAL at 11:22

## 2023-11-04 RX ADMIN — GABAPENTIN 400 MG: 400 CAPSULE ORAL at 00:09

## 2023-11-04 RX ADMIN — SODIUM CHLORIDE, PRESERVATIVE FREE 20 MG: 5 INJECTION INTRAVENOUS at 21:25

## 2023-11-04 RX ADMIN — SODIUM CHLORIDE, POTASSIUM CHLORIDE, SODIUM LACTATE AND CALCIUM CHLORIDE: 600; 310; 30; 20 INJECTION, SOLUTION INTRAVENOUS at 00:14

## 2023-11-04 RX ADMIN — HYDROMORPHONE HYDROCHLORIDE 0.5 MG: 1 INJECTION, SOLUTION INTRAMUSCULAR; INTRAVENOUS; SUBCUTANEOUS at 22:11

## 2023-11-04 RX ADMIN — DIPHENHYDRAMINE HCL 25 MG: 25 TABLET ORAL at 20:28

## 2023-11-04 RX ADMIN — CEFEPIME 2000 MG: 2 INJECTION, POWDER, FOR SOLUTION INTRAVENOUS at 18:02

## 2023-11-04 RX ADMIN — OXYCODONE HYDROCHLORIDE 5 MG: 5 TABLET ORAL at 04:51

## 2023-11-04 RX ADMIN — ONDANSETRON HYDROCHLORIDE 4 MG: 4 TABLET, FILM COATED ORAL at 09:19

## 2023-11-04 RX ADMIN — OXYCODONE HYDROCHLORIDE 5 MG: 5 TABLET ORAL at 00:09

## 2023-11-04 ASSESSMENT — PAIN DESCRIPTION - DESCRIPTORS: DESCRIPTORS: SHARP

## 2023-11-04 ASSESSMENT — PAIN SCALES - GENERAL
PAINLEVEL_OUTOF10: 8
PAINLEVEL_OUTOF10: 9

## 2023-11-04 ASSESSMENT — PAIN DESCRIPTION - LOCATION
LOCATION: ABDOMEN;NECK
LOCATION: ABDOMEN

## 2023-11-04 ASSESSMENT — ENCOUNTER SYMPTOMS
DIARRHEA: 0
SHORTNESS OF BREATH: 0
VOMITING: 0
CONSTIPATION: 0
WHEEZING: 0
BLOOD IN STOOL: 0
NAUSEA: 1

## 2023-11-04 ASSESSMENT — PAIN DESCRIPTION - ORIENTATION: ORIENTATION: RIGHT

## 2023-11-04 NOTE — ED NOTES
Report given to inpatient nurse. Chief complaint, results, vitals, and medications provided all reviewed. No further questions at this time. Patient placed for transport.        Forrest Ortiz RN  11/03/23 2508

## 2023-11-04 NOTE — H&P
palpitations. Gastrointestinal:  Positive for nausea. Negative for blood in stool, constipation, diarrhea and vomiting. Neurological:  Positive for headaches. Negative for dizziness and syncope. Mild, chronic        10 point ROS performed and otherwise negative except as noted above and in HPI. Past Medical, Surgical, Social, Family History:   Past Medical History:   Diagnosis Date    ADHD (attention deficit hyperactivity disorder) 1988    Depression with anxiety     Difficult intubation     airway swelled up    Drug-seeking behavior     Encounter for imaging to screen for metal prior to MRI 06/01/2021    MRI Conditional [employer] Non-Programmable shunt model#[id number] implanted 10/30/2020 at [facility]. Normal Mode. 1.5T or 3.0T. ESBL (extended spectrum beta-lactamase) producing bacteria infection 11/06/2019    urine    Functional ovarian cysts 2008    rt ovary cyst x 2 yrs.     GERD (gastroesophageal reflux disease) When I was a kid    Headache(784.0)     migraines    History of blood transfusion     at birth    History of kidney stones     History of PCOS     had hysterectomy in 2016    Hydrocephalus ([address])     Hyperlipidemia     Irritable bowel syndrome 2004    had colonoscopy about 6 yrs ago    Meningitis 94/7274    Neutrophilic leukocytosis     Nicotine dependence     PONV (postoperative nausea and vomiting)     very nauseated and sometimes wakes up with a Migraine--happened once after brain surgery    Prediabetes     Primary osteoarthritis of left knee 07/01/2016    S/P cone biopsy of cervix 2004    Scoliosis 1990.s    Seizures ([address])     twice--last one in June2022     (ventriculoperitoneal) shunt status 1982    hydrocephalus f/w Neurosurgeon at [facility]    Wears glasses     reading     Past Surgical History:   Procedure Laterality Date    ABDOMEN SURGERY N/A 07/14/2021    REMOVAL OF ABDOMINAL WALL MASS performed by Pablo Pat MD at [facility]  2003    appendicitis extremities  GI/:  Soft, non-tender, non-distended. Normoactive bowel sounds. Musculoskelatal:  No significant edema. Normal ROM.   Skin: scattered areas of hive like rash  Neurologic:  cranial nerves grossly intact, GCS 15, no apparent focal deficits, no meningeal signs    Overall exam limited secondary to patient's obesity        Labs:  Personally reviewed and interpreted for clinical significance and to assist with MDM    Recent Labs     11/03/23  1513   WBC 18.9*   HGB 15.4   HCT 45.9        Recent Labs     11/03/23  1513      K 3.6      CO2 21   BUN 14   CREATININE 0.7   CALCIUM 9.7     Recent Labs     11/03/23  1513   AST 12*   ALT 16   BILITOT <0.2   ALKPHOS 105       Recent Labs     11/03/23  1513 11/03/23  1619   TROPHS <6 <6           Lactic Acid:   Recent Labs     11/03/23  2112   LACTA 2.7*     BNP:   Recent Labs     11/03/23  1513   PROBNP 52     UA:  Lab Results   Component Value Date/Time    NITRU Negative 11/03/2023 04:19 PM    COLORU Yellow 11/03/2023 04:19 PM    PHUR 5.5 11/03/2023 04:19 PM    LABCAST 1-3 Hyaline 10/19/2014 10:57 AM    WBCUA 0-2 09/06/2023 07:40 PM    RBCUA 3-4 09/06/2023 07:40 PM    MUCUS 1+ 02/19/2020 12:42 AM    TRICHOMONAS None Seen 10/25/2019 07:35 PM    YEAST Present 11/12/2019 03:50 AM    BACTERIA Rare 09/06/2023 07:40 PM    CLARITYU Clear 11/03/2023 04:19 PM    SPECGRAV 1.009 11/03/2023 04:19 PM    LEUKOCYTESUR Negative 11/03/2023 04:19 PM    UROBILINOGEN 0.2 11/03/2023 04:19 PM    BILIRUBINUR Negative 11/03/2023 04:19 PM    BILIRUBINUR NEG 01/30/2023 04:31 PM    BILIRUBINUR NEGATIVE 10/08/2011 10:10 PM    BLOODU Negative 11/03/2023 04:19 PM    GLUCOSEU Negative 11/03/2023 04:19 PM    GLUCOSEU NEGATIVE 10/08/2011 10:10 PM    KETUA Negative 11/03/2023 04:19 PM    AMORPHOUS Rare 03/21/2017 06:53 PM         Organism:   Lab Results   Component Value Date/Time    ORG Escherichia coli ESBL 01/30/2023 04:31 PM       Imaging: Personally reviewed and interpreted

## 2023-11-04 NOTE — PROGRESS NOTES
Medication Reconciliation    List of medications patient is currently taking is complete. Source of information: 1.  Conversation with patient at bedside                                      2. EPIC records           Ashleigh [facility], PharmD, 11/4/2023 11:26 AM

## 2023-11-04 NOTE — ASSESSMENT & PLAN NOTE
Suspect acute phase reactants without elevated procalcitonin, crp, or esr. Considered other potential sources of bacterial infection contributing to SIRS, but clinical picture is not consistent with sepsis secondary to bacterial infection. Given her complex medical history will continue antibiotic therapy overnight with low threshold to discontinue antibiotic therapy if she remains clinically non-toxic and stable or involve ID team if she deteriorates.

## 2023-11-04 NOTE — PROGRESS NOTES
Dr. Bettina Henao ordered new medications for pt and ordered to start with zofran as phenergan will drive pt's HR up. Will educate pt concerning this.

## 2023-11-04 NOTE — ASSESSMENT & PLAN NOTE
Neurosurgery team consulted prior to admission. They recommended against lumbar puncture and have a low suspicion for any concerns regarding the  shunt and believe patient is safe to stay at [address] [place] [address] their input and care.

## 2023-11-04 NOTE — PROGRESS NOTES
D: pt has been feeling like she is dizzy and even when laying down, feels as if she is going to pass out. I rechecked her vitals and took her orthostatic vitals. BP is lower when laying down, but normal when sitting up. I told her that this is good, that BP should increase when she sits up. However, her BP when lying down is soft, in 91G systolically. She feels as if the swelling in her stomach has increased and is concerned that Gen. Surg will not address this. She said that MRI in the past has revealed when the shunt is the problem.  A: this RN sent secure message to Dr. Cherie Hernandez concerning this R: will continue to monitor pt

## 2023-11-04 NOTE — PROGRESS NOTES
V2.0  [facility] Hospitalist Progress Note      Name:  Sherman Galarza /Age/Sex: 1980  (41 y.o. female)   MRN & CSN:  [id number] & [id number] Encounter Date/Time: 2023 8:28 AM EDT    Location:  [id number] PCP: Markus Zee Day: 2    Assessment and Plan:   Sherman Galarza is a 41 y.o. female with congenital hydrocephalus s/p  shunt presenting with SIRS of unclear etiology    Plan:  SIRS  -Fever to 102.3 at home. Tachycardia to 130s. Leukocytosis to 18.9 with neutrophil predominance  -Concern for infection, however only localizing factor with patient is her right-sided abdominal pain. -ID consulted, appreciate any input  Right-sided abdominal pain  -On exam tenderness wraps along right thigh down to the lower abdomen and moves midline. Notably this corresponds with her  shunt on CT imaging.  -General surgery consulted; appreciate any input  -From chart review, patient frequently is in the ED with abdominal pain  Tachycardia - improved  -Has noticed tachycardia on her Apple Watch intermittently over a few weeks  -Being worked up by Dr. Keiko Drake cardiology. Holter monitor ordered yesterday but has not received yet. -TSH D-dimer negative  -EKG sinus tachycardia, troponin negative  -Heart rate currently improved; suspect this is related to treatment of SIRS  Syncope  -Patient reported several syncopal episodes at home  -CT head without-acute abnormality  -Echo ordered  Congenital hydrocephalus s/p  shunt (, replaced 10/2020, 2021)  -Patient is convinced that her shunt may be having a problem. States that the MRI has previously demonstrated that the shunt was a problem in the past.  -Patient has a long history of presenting to hospital with N/V and HA. A few times MRI was reviewed flattening of the lateral ventricles concerning for over shunting.   However neurosurgery at [facility] as well as  have refused to proceed with shunt revision or removal as they feel it is pain, leukocytosis Additional signs and symptoms: shunt- hx of shunt infections, Relevant Medical/Surgical History: hx appy, fernanda, hyster, hyernias, shunt, csection, FINDINGS: Lower Chest:  Visualized portion of the lower chest demonstrates no acute abnormality. Organs: Unchanged intrahepatic and extrahepatic biliary dilation, most likely reservoir effect from cholecystectomy. The liver, spleen, kidneys, adrenals, pancreas, and stomach are without acute process. 2 mm right inferior pole nonobstructive calculus. The ureters are nondilated. The bladder is within normal limits. GI/Bowel: Mild diverticulosis of the distal colon. The appendix is within normal limits. No evidence of bowel obstruction. Pelvis: Status post hysterectomy. No adnexal mass. Peritoneum/Retroperitoneum: Presumed  shunt tubing is noted. Patent vasculature. No lymphadenopathy. Mild atherosclerosis. No free fluid or free air. Tiny fat containing periumbilical hernia. Bones/Soft Tissues: No acute osseous abnormality. 1. No acute process in the abdomen or pelvis. 2. 2 mm nonobstructive right renal calculus. XR SHUNT SERIES PLACEMENT (<4 VIEWS)    Result Date: 11/3/2023  EXAMINATION: SHUNT SERIES 11/3/2023 3:13 pm COMPARISON: None. HISTORY: ORDERING SYSTEM PROVIDED HISTORY: eval shunt TECHNOLOGIST PROVIDED HISTORY: Reason for exam:->eval shunt Reason for Exam: eval shunt      shunt catheter with tip near midline on the left. Intracranial portion of the catheter is intact. The extracranial portion of the catheter is also intact. Catheter is followed inferiorly through than the left neck and chest extending into the abdomen and pelvis. No evidence of fracture. The lungs appear clear. Heart and mediastinum are normal.  Visualized loops of bowel unremarkable. RECOMMENDATION:  shunt catheter is intact with no evidence of fracture.   No other significant     XR CHEST (2 VW)    Result Date: 11/3/2023  EXAMINATION: TWO XRAY VIEWS

## 2023-11-05 PROBLEM — R55 NEAR SYNCOPE: Status: ACTIVE | Noted: 2023-11-05

## 2023-11-05 PROBLEM — R65.20 SEVERE SEPSIS (HCC): Status: ACTIVE | Noted: 2018-06-30

## 2023-11-05 PROBLEM — Z71.89 DIABETES EDUCATION, ENCOUNTER FOR: Status: ACTIVE | Noted: 2023-11-05

## 2023-11-05 PROBLEM — Z71.3 WEIGHT LOSS COUNSELING, ENCOUNTER FOR: Status: ACTIVE | Noted: 2023-11-05

## 2023-11-05 LAB
ANION GAP SERPL CALCULATED.3IONS-SCNC: 10 MMOL/L (ref 3–16)
BASOPHILS # BLD: 0.1 K/UL (ref 0–0.2)
BASOPHILS NFR BLD: 1.2 %
BUN SERPL-MCNC: 15 MG/DL (ref 7–20)
CALCIUM SERPL-MCNC: 8.7 MG/DL (ref 8.3–10.6)
CHLORIDE SERPL-SCNC: 104 MMOL/L (ref 99–110)
CO2 SERPL-SCNC: 27 MMOL/L (ref 21–32)
CREAT SERPL-MCNC: 0.8 MG/DL (ref 0.6–1.1)
DEPRECATED RDW RBC AUTO: 14.6 % (ref 12.4–15.4)
EOSINOPHIL # BLD: 0.1 K/UL (ref 0–0.6)
EOSINOPHIL NFR BLD: 0.5 %
GFR SERPLBLD CREATININE-BSD FMLA CKD-EPI: >60 ML/MIN/{1.73_M2}
GLUCOSE BLD-MCNC: 103 MG/DL (ref 70–99)
GLUCOSE BLD-MCNC: 104 MG/DL (ref 70–99)
GLUCOSE BLD-MCNC: 118 MG/DL (ref 70–99)
GLUCOSE BLD-MCNC: 121 MG/DL (ref 70–99)
GLUCOSE BLD-MCNC: 133 MG/DL (ref 70–99)
GLUCOSE SERPL-MCNC: 125 MG/DL (ref 70–99)
HCT VFR BLD AUTO: 40.4 % (ref 36–48)
HGB BLD-MCNC: 13.8 G/DL (ref 12–16)
LYMPHOCYTES # BLD: 4 K/UL (ref 1–5.1)
LYMPHOCYTES NFR BLD: 36.7 %
MCH RBC QN AUTO: 29.4 PG (ref 26–34)
MCHC RBC AUTO-ENTMCNC: 34.2 G/DL (ref 31–36)
MCV RBC AUTO: 86 FL (ref 80–100)
MONOCYTES # BLD: 0.6 K/UL (ref 0–1.3)
MONOCYTES NFR BLD: 5.1 %
NEUTROPHILS # BLD: 6.1 K/UL (ref 1.7–7.7)
NEUTROPHILS NFR BLD: 56.5 %
PERFORMED ON: ABNORMAL
PLATELET # BLD AUTO: 217 K/UL (ref 135–450)
PMV BLD AUTO: 8.5 FL (ref 5–10.5)
POTASSIUM SERPL-SCNC: 3.8 MMOL/L (ref 3.5–5.1)
RBC # BLD AUTO: 4.69 M/UL (ref 4–5.2)
SODIUM SERPL-SCNC: 141 MMOL/L (ref 136–145)
WBC # BLD AUTO: 10.8 K/UL (ref 4–11)

## 2023-11-05 PROCEDURE — 94760 N-INVAS EAR/PLS OXIMETRY 1: CPT

## 2023-11-05 PROCEDURE — 6360000002 HC RX W HCPCS: Performed by: FAMILY MEDICINE

## 2023-11-05 PROCEDURE — 6370000000 HC RX 637 (ALT 250 FOR IP): Performed by: FAMILY MEDICINE

## 2023-11-05 PROCEDURE — 6360000002 HC RX W HCPCS: Performed by: STUDENT IN AN ORGANIZED HEALTH CARE EDUCATION/TRAINING PROGRAM

## 2023-11-05 PROCEDURE — 6370000000 HC RX 637 (ALT 250 FOR IP): Performed by: STUDENT IN AN ORGANIZED HEALTH CARE EDUCATION/TRAINING PROGRAM

## 2023-11-05 PROCEDURE — 6360000002 HC RX W HCPCS: Performed by: REGISTERED NURSE

## 2023-11-05 PROCEDURE — 6370000000 HC RX 637 (ALT 250 FOR IP): Performed by: REGISTERED NURSE

## 2023-11-05 PROCEDURE — 36415 COLL VENOUS BLD VENIPUNCTURE: CPT

## 2023-11-05 PROCEDURE — 99255 IP/OBS CONSLTJ NEW/EST HI 80: CPT | Performed by: INTERNAL MEDICINE

## 2023-11-05 PROCEDURE — 1200000000 HC SEMI PRIVATE

## 2023-11-05 PROCEDURE — 80048 BASIC METABOLIC PNL TOTAL CA: CPT

## 2023-11-05 PROCEDURE — 2580000003 HC RX 258: Performed by: FAMILY MEDICINE

## 2023-11-05 PROCEDURE — 85025 COMPLETE CBC W/AUTO DIFF WBC: CPT

## 2023-11-05 RX ORDER — HYDROXYZINE HYDROCHLORIDE 10 MG/1
10 TABLET, FILM COATED ORAL 4 TIMES DAILY PRN
Status: DISCONTINUED | OUTPATIENT
Start: 2023-11-05 | End: 2023-11-10 | Stop reason: HOSPADM

## 2023-11-05 RX ORDER — DIPHENHYDRAMINE HYDROCHLORIDE 50 MG/ML
12.5 INJECTION INTRAMUSCULAR; INTRAVENOUS EVERY 6 HOURS PRN
Status: DISCONTINUED | OUTPATIENT
Start: 2023-11-05 | End: 2023-11-06

## 2023-11-05 RX ORDER — DIPHENHYDRAMINE HCL 25 MG
25 TABLET ORAL EVERY 6 HOURS PRN
Status: DISCONTINUED | OUTPATIENT
Start: 2023-11-05 | End: 2023-11-06

## 2023-11-05 RX ADMIN — LAMOTRIGINE 100 MG: 100 TABLET ORAL at 09:45

## 2023-11-05 RX ADMIN — CEFEPIME 2000 MG: 2 INJECTION, POWDER, FOR SOLUTION INTRAVENOUS at 10:02

## 2023-11-05 RX ADMIN — HYDROMORPHONE HYDROCHLORIDE 0.5 MG: 1 INJECTION, SOLUTION INTRAMUSCULAR; INTRAVENOUS; SUBCUTANEOUS at 09:42

## 2023-11-05 RX ADMIN — GABAPENTIN 400 MG: 400 CAPSULE ORAL at 09:45

## 2023-11-05 RX ADMIN — DIPHENHYDRAMINE HCL 50 MG: 25 TABLET ORAL at 16:10

## 2023-11-05 RX ADMIN — ONDANSETRON HYDROCHLORIDE 4 MG: 4 TABLET, FILM COATED ORAL at 08:19

## 2023-11-05 RX ADMIN — HYDROMORPHONE HYDROCHLORIDE 0.5 MG: 1 INJECTION, SOLUTION INTRAMUSCULAR; INTRAVENOUS; SUBCUTANEOUS at 05:45

## 2023-11-05 RX ADMIN — DIPHENHYDRAMINE HYDROCHLORIDE 12.5 MG: 50 INJECTION INTRAMUSCULAR; INTRAVENOUS at 21:16

## 2023-11-05 RX ADMIN — CEFEPIME 2000 MG: 2 INJECTION, POWDER, FOR SOLUTION INTRAVENOUS at 03:40

## 2023-11-05 RX ADMIN — DIPHENHYDRAMINE HCL 50 MG: 25 TABLET ORAL at 09:55

## 2023-11-05 RX ADMIN — CEFEPIME 2000 MG: 2 INJECTION, POWDER, FOR SOLUTION INTRAVENOUS at 18:16

## 2023-11-05 RX ADMIN — ENOXAPARIN SODIUM 40 MG: 100 INJECTION SUBCUTANEOUS at 09:47

## 2023-11-05 RX ADMIN — ATORVASTATIN CALCIUM 10 MG: 10 TABLET, FILM COATED ORAL at 21:17

## 2023-11-05 RX ADMIN — HYDROMORPHONE HYDROCHLORIDE 0.5 MG: 1 INJECTION, SOLUTION INTRAMUSCULAR; INTRAVENOUS; SUBCUTANEOUS at 22:16

## 2023-11-05 RX ADMIN — HYDROMORPHONE HYDROCHLORIDE 0.5 MG: 1 INJECTION, SOLUTION INTRAMUSCULAR; INTRAVENOUS; SUBCUTANEOUS at 14:00

## 2023-11-05 RX ADMIN — HYDROMORPHONE HYDROCHLORIDE 0.5 MG: 1 INJECTION, SOLUTION INTRAMUSCULAR; INTRAVENOUS; SUBCUTANEOUS at 01:06

## 2023-11-05 RX ADMIN — HYDROMORPHONE HYDROCHLORIDE 0.5 MG: 1 INJECTION, SOLUTION INTRAMUSCULAR; INTRAVENOUS; SUBCUTANEOUS at 18:08

## 2023-11-05 RX ADMIN — SODIUM CHLORIDE, PRESERVATIVE FREE 10 ML: 5 INJECTION INTRAVENOUS at 21:18

## 2023-11-05 RX ADMIN — DIPHENHYDRAMINE HCL 50 MG: 25 TABLET ORAL at 02:34

## 2023-11-05 RX ADMIN — ONDANSETRON HYDROCHLORIDE 4 MG: 4 TABLET, FILM COATED ORAL at 01:22

## 2023-11-05 RX ADMIN — GABAPENTIN 400 MG: 400 CAPSULE ORAL at 21:17

## 2023-11-05 ASSESSMENT — ENCOUNTER SYMPTOMS
NAUSEA: 0
COUGH: 0
EYE DISCHARGE: 0
WHEEZING: 0
RHINORRHEA: 0
SORE THROAT: 0
DIARRHEA: 0
BACK PAIN: 0
CONSTIPATION: 0
SHORTNESS OF BREATH: 0
ABDOMINAL PAIN: 1
EYE REDNESS: 0
SINUS PAIN: 0
SINUS PRESSURE: 0

## 2023-11-05 ASSESSMENT — PAIN DESCRIPTION - ONSET: ONSET: ON-GOING

## 2023-11-05 ASSESSMENT — PAIN - FUNCTIONAL ASSESSMENT: PAIN_FUNCTIONAL_ASSESSMENT: ACTIVITIES ARE NOT PREVENTED

## 2023-11-05 ASSESSMENT — PAIN DESCRIPTION - LOCATION
LOCATION: ABDOMEN
LOCATION: ABDOMEN;NECK
LOCATION: ABDOMEN

## 2023-11-05 ASSESSMENT — PAIN DESCRIPTION - PAIN TYPE: TYPE: ACUTE PAIN

## 2023-11-05 ASSESSMENT — PAIN SCALES - GENERAL
PAINLEVEL_OUTOF10: 9
PAINLEVEL_OUTOF10: 3
PAINLEVEL_OUTOF10: 9
PAINLEVEL_OUTOF10: 9
PAINLEVEL_OUTOF10: 8
PAINLEVEL_OUTOF10: 9
PAINLEVEL_OUTOF10: 8

## 2023-11-05 ASSESSMENT — PAIN DESCRIPTION - FREQUENCY: FREQUENCY: CONTINUOUS

## 2023-11-05 ASSESSMENT — PAIN DESCRIPTION - ORIENTATION: ORIENTATION: RIGHT;LOWER

## 2023-11-05 ASSESSMENT — PAIN DESCRIPTION - DESCRIPTORS: DESCRIPTORS: SHARP

## 2023-11-05 NOTE — CONSULTS
Infectious Diseases   Consult Note        Admission Date: 11/3/2023  Hospital Day: Hospital Day: 3   Attending: Kaylan Vu MD  Date of service: 11/5/23     Reason for admission: Tachycardia [R00.0]  Generalized abdominal pain [R10.84]  SIRS (systemic inflammatory response syndrome) ([address]) [R65.10]  Severe sepsis ([address]) [A41.9, R65.20]  Leukocytosis, unspecified type [D65.864]    Chief complaint/ Reason for consult: Sepsis      Microbiology:        I have reviewed allavailable micro lab data and cultures    Blood culture  - collected on 11/3/2023: in process      Antibiotics and immunizations:       Current antibiotics: All antibiotics and their doses were reviewed by me    Recent Abx Admin                     ceFEPIme (MAXIPIME) 2,000 mg in sodium chloride 0.9 % 100 mL IVPB (mini-bag) (mg) 2,000 mg New Bag 11/05/23 1002     2,000 mg New Bag  0340     2,000 mg New Bag 11/04/23 1802                      Immunization History: All immunization history was reviewed by me today. Immunization History   Administered Date(s) Administered    COVID-19, PFIZER PURPLE top, DILUTE for use, (age 15 y+), 30mcg/0.3mL 11/10/2021    Influenza Vaccine, unspecified formulation 10/19/2013    Influenza Virus Vaccine 11/04/2010, 10/06/2018, 10/30/2020, 11/02/2021       Known drug allergies:      All allergies were reviewed and updated    Allergies   Allergen Reactions    Bee Venom Shortness Of Breath and Swelling    Bentyl [Dicyclomine Hcl] Shortness Of Breath and Anxiety    Dicyclomine Anxiety and Shortness Of Breath    Ketorolac Tromethamine Hives and Itching     Injectable only  Tolerates PO NSAIDs fine    Levofloxacin Anxiety and Hives    Maitake Anaphylaxis    Shiitake Mushroom Anaphylaxis     Can not eat mushrooms    Sulfa Antibiotics Shortness Of Breath    Vancomycin Anaphylaxis and Hives    Zosyn [Piperacillin Sod-Tazobactam So] Hives, Shortness Of Breath, Nausea Only and Dizziness or Vertigo    Adhesive Tape Dermatitis evidence of acute cardiopulmonary pathology. Outside records:    Labs, Microbiology, Radiology and pertinent results from Care everywhere, if available, were reviewed as a part ofthe consultation.       Problem list:       Patient Active Problem List   Diagnosis Code    Attention deficit hyperactivity disorder (ADHD) F90.9     (ventriculoperitoneal) shunt status Z98.2    Scoliosis M41.9    Prediabetes R73.03    Tobacco smoker Z72.0    Onychomycosis B35.1    Rectal bleeding K62.5    Hydrocephalus ([facility]) G91.9    Sepsis ([facility]) A41.9    Ureteritis N28.89    Acute cystitis without hematuria N30.00    Lactic acidosis E87.20    Leukocytosis D72.829    History of brain shunt Z98.2    Mood disorder ([facility]) F39    Numbness and tingling in left hand R20.0, R20.2    Diverticulosis K57.90    Colitis K52.9    Infection due to extended-spectrum beta-lactamase-producing Escherichia coli A49.8, Z16.12    Kidney stone N20.0    Intermittent small bowel obstruction due to adhesions [facility]) K56.50    Abdominal wall mass R22.2    Cyst and pseudocyst of pancreas K86.2, K86.3    Abdominal pain in female R10.9    Nausea and vomiting R11.2    COVID U07.1    Abdominal wall cellulitis L03.311    DM2 (diabetes mellitus, type 2) ([facility]) E11.9    HTN (hypertension) I10    Cellulitis L03.90    History of extended-spectrum beta-lactamase producing Escherichia coli infection Z86.19    H/O laparoscopy Z98.890    Allergy to multiple antibiotics Z88.1    Seizure-like activity ([facility]) R56.9    Multiple drug resistant organism (MDRO) culture positive Z16.24    Renal stones N20.0    Diverticulitis K57.92    Abdominal pain R10.9    Mixed hyperlipidemia E78.2    Seizure disorder ([facility]) G40.909    Abnormal EKG R94.31    Atypical chest pain R07.89    Tachycardia R00.0    SIRS (systemic inflammatory response syndrome) ([facility]) R65.10    Hyperglycemia due to type 2 diabetes mellitus ([facility]) E11.65    S/P  shunt Z98.2    Obesity with serious comorbidity E66.9

## 2023-11-05 NOTE — PLAN OF CARE
Problem: Discharge Planning  Goal: Discharge to home or other facility with appropriate resources  11/5/2023 1829 by Jimmie Cope, RN  Outcome: Progressing

## 2023-11-05 NOTE — PROGRESS NOTES
D: pt complaining of nausea this morning A: this RN checked pt's BS, 104. Gave pt zofran and crackers and lemon lime soda, pt asking for pain medication R: this RN let pt know that pain medication can be administered at 0945. D: 10 minutes later, pt called this RN to say that her chest if hurting A: this RN called [facility], pt's HR is 71 SR, this RN shared this information with pt R: pt stated, \"yeah, I know it is not my heart, but I just think it is because of the pain. \" This RN said that I will be back to check on pt and give pain medication as soon as possible. Will continue to monitor pt.

## 2023-11-05 NOTE — PLAN OF CARE
Problem: Discharge Planning  Goal: Discharge to home or other facility with appropriate resources  11/5/2023 0525 by Barbara Lynch RN  Outcome: Progressing  11/4/2023 2021 by Bradley Swain RN  Outcome: Progressing     Problem: Safety - Adult  Goal: Free from fall injury  11/5/2023 0525 by Barbara Lynch RN  Outcome: Progressing  11/4/2023 2021 by Bradley Swain RN  Outcome: Progressing     Problem: Pain  Goal: Verbalizes/displays adequate comfort level or baseline comfort level  Outcome: Progressing

## 2023-11-05 NOTE — PROGRESS NOTES
V2.0  [facility] Hospitalist Progress Note      Name:  Shahla Orellana /Age/Sex: 1980  (41 y.o. female)   MRN & CSN:  [id number] & [id number] Encounter Date/Time: 2023 8:28 AM EDT    Location:  [id number] PCP: Bertis Soulier, [place] Day: 3    Assessment and Plan:   Shahla Orellana is a 41 y.o. female with congenital hydrocephalus s/p  shunt presenting with SIRS of unclear etiology    Plan:  SIRS  -Fever to 102.3 at home. Tachycardia to 130s. Leukocytosis to 18.9 with neutrophil predominance  -Concern for infection, however only localizing factor with patient is her right-sided abdominal pain. -ID consulted, appreciate any input  -Day 3 of antibiotics with cefepime  Right-sided abdominal pain  -On exam tenderness wraps along right thigh down to the lower abdomen and moves midline. Notably this corresponds with her  shunt on CT imaging.  -General surgery consulted; feel this is not likely related to the shunt. No apparent surgical intervention  -From chart review, patient frequently is in the ED with abdominal pain  Tachycardia - improved  -Has noticed tachycardia on her Apple Watch intermittently over a few weeks  -Being worked up by Dr. Benjamín Griffith cardiology. Holter monitor ordered yesterday but has not received yet. -TSH D-dimer negative  -EKG sinus tachycardia, troponin negative  -Heart rate currently improved; suspect this is related to treatment of SIRS  Syncope  -Patient reported several syncopal episodes at home  -CT head without-acute abnormality  -Echo ordered  Congenital hydrocephalus s/p  shunt (, replaced 10/2020, 2021)  -Patient is convinced that her shunt may be having a problem. States that the MRI has previously demonstrated that the shunt was a problem in the past.  -Patient has a long history of presenting to hospital with N/V and HA. A few times MRI was reviewed flattening of the lateral ventricles concerning for over shunting.   However neurosurgery at CT ABDOMEN PELVIS W IV CONTRAST Additional Contrast? None    Result Date: 11/3/2023  EXAMINATION: CT OF THE ABDOMEN AND PELVIS WITH CONTRAST 11/3/2023 5:56 pm TECHNIQUE: CT of the abdomen and pelvis was performed with the administration of intravenous contrast. Multiplanar reformatted images are provided for review. Automated exposure control, iterative reconstruction, and/or weight based adjustment of the mA/kV was utilized to reduce the radiation dose to as low as reasonably achievable. COMPARISON: 10/07/2023 HISTORY: ORDERING SYSTEM PROVIDED HISTORY: abd pain, leukocytosis TECHNOLOGIST PROVIDED HISTORY: Reason for exam:->abd pain, leukocytosis Additional Contrast?->None Decision Support Exception - unselect if not a suspected or confirmed emergency medical condition->Emergency Medical Condition (MA) Reason for Exam: abd pain, leukocytosis Additional signs and symptoms: shunt- hx of shunt infections, Relevant Medical/Surgical History: hx appy, fernanda, hyster, hyernias, shunt, csection, FINDINGS: Lower Chest:  Visualized portion of the lower chest demonstrates no acute abnormality. Organs: Unchanged intrahepatic and extrahepatic biliary dilation, most likely reservoir effect from cholecystectomy. The liver, spleen, kidneys, adrenals, pancreas, and stomach are without acute process. 2 mm right inferior pole nonobstructive calculus. The ureters are nondilated. The bladder is within normal limits. GI/Bowel: Mild diverticulosis of the distal colon. The appendix is within normal limits. No evidence of bowel obstruction. Pelvis: Status post hysterectomy. No adnexal mass. Peritoneum/Retroperitoneum: Presumed  shunt tubing is noted. Patent vasculature. No lymphadenopathy. Mild atherosclerosis. No free fluid or free air. Tiny fat containing periumbilical hernia. Bones/Soft Tissues: No acute osseous abnormality. 1. No acute process in the abdomen or pelvis. 2. 2 mm nonobstructive right renal calculus.

## 2023-11-05 NOTE — PLAN OF CARE
Problem: Discharge Planning  Goal: Discharge to home or other facility with appropriate resources  11/4/2023 2021 by Delia Smith RN  Outcome: Progressing

## 2023-11-06 ENCOUNTER — APPOINTMENT (OUTPATIENT)
Dept: ULTRASOUND IMAGING | Age: 43
End: 2023-11-06
Payer: COMMERCIAL

## 2023-11-06 ENCOUNTER — APPOINTMENT (OUTPATIENT)
Dept: MRI IMAGING | Age: 43
End: 2023-11-06
Payer: COMMERCIAL

## 2023-11-06 LAB
ALBUMIN SERPL-MCNC: 3.8 G/DL (ref 3.4–5)
ALP SERPL-CCNC: 91 U/L (ref 40–129)
ALT SERPL-CCNC: 20 U/L (ref 10–40)
ANION GAP SERPL CALCULATED.3IONS-SCNC: 10 MMOL/L (ref 3–16)
AST SERPL-CCNC: 24 U/L (ref 15–37)
BASOPHILS # BLD: 0.1 K/UL (ref 0–0.2)
BASOPHILS NFR BLD: 0.7 %
BILIRUB DIRECT SERPL-MCNC: <0.2 MG/DL (ref 0–0.3)
BILIRUB INDIRECT SERPL-MCNC: NORMAL MG/DL (ref 0–1)
BILIRUB SERPL-MCNC: <0.2 MG/DL (ref 0–1)
BUN SERPL-MCNC: 17 MG/DL (ref 7–20)
CALCIUM SERPL-MCNC: 9 MG/DL (ref 8.3–10.6)
CHLORIDE SERPL-SCNC: 101 MMOL/L (ref 99–110)
CO2 SERPL-SCNC: 26 MMOL/L (ref 21–32)
CREAT SERPL-MCNC: 0.8 MG/DL (ref 0.6–1.1)
DEPRECATED RDW RBC AUTO: 14.4 % (ref 12.4–15.4)
EOSINOPHIL # BLD: 0.2 K/UL (ref 0–0.6)
EOSINOPHIL NFR BLD: 1.9 %
GFR SERPLBLD CREATININE-BSD FMLA CKD-EPI: >60 ML/MIN/{1.73_M2}
GLUCOSE BLD-MCNC: 102 MG/DL (ref 70–99)
GLUCOSE BLD-MCNC: 125 MG/DL (ref 70–99)
GLUCOSE BLD-MCNC: 211 MG/DL (ref 70–99)
GLUCOSE BLD-MCNC: 99 MG/DL (ref 70–99)
GLUCOSE SERPL-MCNC: 95 MG/DL (ref 70–99)
HCT VFR BLD AUTO: 42 % (ref 36–48)
HGB BLD-MCNC: 14.2 G/DL (ref 12–16)
LYMPHOCYTES # BLD: 3.3 K/UL (ref 1–5.1)
LYMPHOCYTES NFR BLD: 33.5 %
MCH RBC QN AUTO: 29.1 PG (ref 26–34)
MCHC RBC AUTO-ENTMCNC: 33.9 G/DL (ref 31–36)
MCV RBC AUTO: 85.9 FL (ref 80–100)
MONOCYTES # BLD: 0.6 K/UL (ref 0–1.3)
MONOCYTES NFR BLD: 5.9 %
NEUTROPHILS # BLD: 5.8 K/UL (ref 1.7–7.7)
NEUTROPHILS NFR BLD: 58 %
PERFORMED ON: ABNORMAL
PERFORMED ON: NORMAL
PLATELET # BLD AUTO: 224 K/UL (ref 135–450)
PMV BLD AUTO: 8.4 FL (ref 5–10.5)
POTASSIUM SERPL-SCNC: 3.8 MMOL/L (ref 3.5–5.1)
PROT SERPL-MCNC: 6.8 G/DL (ref 6.4–8.2)
RBC # BLD AUTO: 4.89 M/UL (ref 4–5.2)
REPORT: NORMAL
RESP PATH DNA+RNA PNL NPH NAA+NON-PROBE: NORMAL
SODIUM SERPL-SCNC: 137 MMOL/L (ref 136–145)
WBC # BLD AUTO: 9.9 K/UL (ref 4–11)

## 2023-11-06 PROCEDURE — 0202U NFCT DS 22 TRGT SARS-COV-2: CPT

## 2023-11-06 PROCEDURE — 93306 TTE W/DOPPLER COMPLETE: CPT

## 2023-11-06 PROCEDURE — 6360000002 HC RX W HCPCS: Performed by: FAMILY MEDICINE

## 2023-11-06 PROCEDURE — 6370000000 HC RX 637 (ALT 250 FOR IP): Performed by: STUDENT IN AN ORGANIZED HEALTH CARE EDUCATION/TRAINING PROGRAM

## 2023-11-06 PROCEDURE — 02HV33Z INSERTION OF INFUSION DEVICE INTO SUPERIOR VENA CAVA, PERCUTANEOUS APPROACH: ICD-10-PCS | Performed by: INTERNAL MEDICINE

## 2023-11-06 PROCEDURE — 1200000000 HC SEMI PRIVATE

## 2023-11-06 PROCEDURE — 6370000000 HC RX 637 (ALT 250 FOR IP): Performed by: FAMILY MEDICINE

## 2023-11-06 PROCEDURE — 6360000002 HC RX W HCPCS: Performed by: STUDENT IN AN ORGANIZED HEALTH CARE EDUCATION/TRAINING PROGRAM

## 2023-11-06 PROCEDURE — 80048 BASIC METABOLIC PNL TOTAL CA: CPT

## 2023-11-06 PROCEDURE — 6360000002 HC RX W HCPCS: Performed by: REGISTERED NURSE

## 2023-11-06 PROCEDURE — 36415 COLL VENOUS BLD VENIPUNCTURE: CPT

## 2023-11-06 PROCEDURE — 74181 MRI ABDOMEN W/O CONTRAST: CPT

## 2023-11-06 PROCEDURE — 80076 HEPATIC FUNCTION PANEL: CPT

## 2023-11-06 PROCEDURE — 36569 INSJ PICC 5 YR+ W/O IMAGING: CPT

## 2023-11-06 PROCEDURE — 2580000003 HC RX 258: Performed by: STUDENT IN AN ORGANIZED HEALTH CARE EDUCATION/TRAINING PROGRAM

## 2023-11-06 PROCEDURE — 99232 SBSQ HOSP IP/OBS MODERATE 35: CPT | Performed by: INTERNAL MEDICINE

## 2023-11-06 PROCEDURE — 85025 COMPLETE CBC W/AUTO DIFF WBC: CPT

## 2023-11-06 PROCEDURE — 2580000003 HC RX 258: Performed by: FAMILY MEDICINE

## 2023-11-06 PROCEDURE — 76705 ECHO EXAM OF ABDOMEN: CPT

## 2023-11-06 RX ORDER — SODIUM CHLORIDE 9 MG/ML
25 INJECTION, SOLUTION INTRAVENOUS PRN
Status: DISCONTINUED | OUTPATIENT
Start: 2023-11-06 | End: 2023-11-10 | Stop reason: HOSPADM

## 2023-11-06 RX ORDER — LANOLIN ALCOHOL/MO/W.PET/CERES
3 CREAM (GRAM) TOPICAL NIGHTLY PRN
Status: DISCONTINUED | OUTPATIENT
Start: 2023-11-06 | End: 2023-11-10 | Stop reason: HOSPADM

## 2023-11-06 RX ORDER — TRIAMCINOLONE ACETONIDE 1 MG/G
CREAM TOPICAL 2 TIMES DAILY
Status: DISCONTINUED | OUTPATIENT
Start: 2023-11-06 | End: 2023-11-10 | Stop reason: HOSPADM

## 2023-11-06 RX ORDER — LORAZEPAM 1 MG/1
1 TABLET ORAL
Status: ACTIVE | OUTPATIENT
Start: 2023-11-06 | End: 2023-11-07

## 2023-11-06 RX ORDER — LORAZEPAM 1 MG/1
1 TABLET ORAL
Status: DISCONTINUED | OUTPATIENT
Start: 2023-11-06 | End: 2023-11-06

## 2023-11-06 RX ORDER — SODIUM CHLORIDE 0.9 % (FLUSH) 0.9 %
5-40 SYRINGE (ML) INJECTION EVERY 12 HOURS SCHEDULED
Status: DISCONTINUED | OUTPATIENT
Start: 2023-11-06 | End: 2023-11-10 | Stop reason: HOSPADM

## 2023-11-06 RX ORDER — LIDOCAINE HYDROCHLORIDE 10 MG/ML
5 INJECTION, SOLUTION EPIDURAL; INFILTRATION; INTRACAUDAL; PERINEURAL ONCE
Status: DISCONTINUED | OUTPATIENT
Start: 2023-11-06 | End: 2023-11-10 | Stop reason: HOSPADM

## 2023-11-06 RX ORDER — SODIUM CHLORIDE 0.9 % (FLUSH) 0.9 %
5-40 SYRINGE (ML) INJECTION PRN
Status: DISCONTINUED | OUTPATIENT
Start: 2023-11-06 | End: 2023-11-10 | Stop reason: HOSPADM

## 2023-11-06 RX ORDER — DIPHENHYDRAMINE HYDROCHLORIDE 50 MG/ML
25 INJECTION INTRAMUSCULAR; INTRAVENOUS EVERY 6 HOURS PRN
Status: DISCONTINUED | OUTPATIENT
Start: 2023-11-06 | End: 2023-11-10 | Stop reason: HOSPADM

## 2023-11-06 RX ORDER — DIPHENHYDRAMINE HCL 25 MG
25 TABLET ORAL EVERY 6 HOURS PRN
Status: DISCONTINUED | OUTPATIENT
Start: 2023-11-06 | End: 2023-11-10 | Stop reason: HOSPADM

## 2023-11-06 RX ADMIN — CEFEPIME 2000 MG: 2 INJECTION, POWDER, FOR SOLUTION INTRAVENOUS at 02:15

## 2023-11-06 RX ADMIN — ENOXAPARIN SODIUM 40 MG: 100 INJECTION SUBCUTANEOUS at 08:56

## 2023-11-06 RX ADMIN — DIPHENHYDRAMINE HYDROCHLORIDE 12.5 MG: 50 INJECTION INTRAMUSCULAR; INTRAVENOUS at 08:56

## 2023-11-06 RX ADMIN — LAMOTRIGINE 100 MG: 100 TABLET ORAL at 08:55

## 2023-11-06 RX ADMIN — HYDROMORPHONE HYDROCHLORIDE 1 MG: 1 INJECTION, SOLUTION INTRAMUSCULAR; INTRAVENOUS; SUBCUTANEOUS at 22:28

## 2023-11-06 RX ADMIN — ATORVASTATIN CALCIUM 10 MG: 10 TABLET, FILM COATED ORAL at 20:50

## 2023-11-06 RX ADMIN — SODIUM CHLORIDE, PRESERVATIVE FREE 10 ML: 5 INJECTION INTRAVENOUS at 21:00

## 2023-11-06 RX ADMIN — ONDANSETRON HYDROCHLORIDE 4 MG: 4 TABLET, FILM COATED ORAL at 20:58

## 2023-11-06 RX ADMIN — HYDROMORPHONE HYDROCHLORIDE 0.5 MG: 1 INJECTION, SOLUTION INTRAMUSCULAR; INTRAVENOUS; SUBCUTANEOUS at 10:18

## 2023-11-06 RX ADMIN — CEFEPIME 2000 MG: 2 INJECTION, POWDER, FOR SOLUTION INTRAVENOUS at 21:00

## 2023-11-06 RX ADMIN — ONDANSETRON HYDROCHLORIDE 4 MG: 4 TABLET, FILM COATED ORAL at 10:19

## 2023-11-06 RX ADMIN — ONDANSETRON HYDROCHLORIDE 4 MG: 4 TABLET, FILM COATED ORAL at 02:10

## 2023-11-06 RX ADMIN — TRIAMCINOLONE ACETONIDE: 1 CREAM TOPICAL at 12:33

## 2023-11-06 RX ADMIN — DIPHENHYDRAMINE HYDROCHLORIDE 12.5 MG: 50 INJECTION INTRAMUSCULAR; INTRAVENOUS at 04:21

## 2023-11-06 RX ADMIN — CEFEPIME 2000 MG: 2 INJECTION, POWDER, FOR SOLUTION INTRAVENOUS at 09:09

## 2023-11-06 RX ADMIN — HYDROMORPHONE HYDROCHLORIDE 0.5 MG: 1 INJECTION, SOLUTION INTRAMUSCULAR; INTRAVENOUS; SUBCUTANEOUS at 02:16

## 2023-11-06 RX ADMIN — HYDROMORPHONE HYDROCHLORIDE 0.5 MG: 1 INJECTION, SOLUTION INTRAMUSCULAR; INTRAVENOUS; SUBCUTANEOUS at 06:32

## 2023-11-06 RX ADMIN — DIPHENHYDRAMINE HYDROCHLORIDE 25 MG: 50 INJECTION INTRAMUSCULAR; INTRAVENOUS at 17:58

## 2023-11-06 RX ADMIN — GABAPENTIN 400 MG: 400 CAPSULE ORAL at 20:50

## 2023-11-06 RX ADMIN — HYDROMORPHONE HYDROCHLORIDE 1 MG: 1 INJECTION, SOLUTION INTRAMUSCULAR; INTRAVENOUS; SUBCUTANEOUS at 16:29

## 2023-11-06 RX ADMIN — HYDROMORPHONE HYDROCHLORIDE 0.25 MG: 1 INJECTION, SOLUTION INTRAMUSCULAR; INTRAVENOUS; SUBCUTANEOUS at 12:28

## 2023-11-06 RX ADMIN — TRIAMCINOLONE ACETONIDE: 1 CREAM TOPICAL at 20:53

## 2023-11-06 RX ADMIN — GABAPENTIN 400 MG: 400 CAPSULE ORAL at 08:56

## 2023-11-06 RX ADMIN — SODIUM CHLORIDE, PRESERVATIVE FREE 10 ML: 5 INJECTION INTRAVENOUS at 20:53

## 2023-11-06 ASSESSMENT — PAIN DESCRIPTION - LOCATION
LOCATION: ABDOMEN

## 2023-11-06 ASSESSMENT — PAIN SCALES - GENERAL
PAINLEVEL_OUTOF10: 10
PAINLEVEL_OUTOF10: 10
PAINLEVEL_OUTOF10: 8
PAINLEVEL_OUTOF10: 9
PAINLEVEL_OUTOF10: 2

## 2023-11-06 ASSESSMENT — PAIN DESCRIPTION - ORIENTATION
ORIENTATION: UPPER
ORIENTATION: RIGHT
ORIENTATION: RIGHT

## 2023-11-06 ASSESSMENT — PAIN DESCRIPTION - FREQUENCY: FREQUENCY: CONTINUOUS

## 2023-11-06 ASSESSMENT — PAIN DESCRIPTION - DESCRIPTORS
DESCRIPTORS: SHARP
DESCRIPTORS: SHARP
DESCRIPTORS: SHOOTING

## 2023-11-06 ASSESSMENT — PAIN DESCRIPTION - PAIN TYPE: TYPE: ACUTE PAIN

## 2023-11-06 ASSESSMENT — PAIN DESCRIPTION - ONSET: ONSET: ON-GOING

## 2023-11-06 NOTE — PROGRESS NOTES
V2.0  [facility] [place] Hospitalist Progress Note      Name:  Emy Vasquez /Age/Sex: 1980  (41 y.o. female)   MRN & CSN:  [id number] & [id number] Encounter Date/Time: 2023 8:28 AM EDT    Location:  [id number]/6317-31 PCP: Markus Estrada Day: 4    Assessment and Plan:   Emy Vasquez is a 41 y.o. female with congenital hydrocephalus s/p  shunt presenting with SIRS of unclear etiology    Plan:  SIRS - improved  -On presentation: Fever to 102.3 at home. Tachycardia to 130s. Leukocytosis to 18.9 with neutrophil predominance. Now normalized  -Concern for infection, however only localizing factor with patient is her right-sided abdominal pain. -ID consulted, appreciate any input  -Day 4 of antibiotics with cefepime  Right-sided abdominal pain  -On exam tenderness wraps along right thigh down to the lower abdomen and moves midline. Notably this corresponds with her  shunt on CT imaging.  -Additional abdominal fullness today. We will obtain repeat imaging  -General surgery consulted; feel this is not likely related to the shunt. No apparent surgical intervention  -From chart review, patient frequently is in the ED with abdominal pain. Discussed with patient that if this is related to her shunt, and may end up being more of a long-term issue. Felt that she may benefit from establishing with pain management at discharge. Patient apprehensive stating that she does not want to become addicted. Tachycardia - improved  -Has noticed tachycardia on her Apple Watch intermittently over a few weeks  -Being worked up by Dr. Muñoz Slight cardiology. Holter monitor ordered yesterday but has not received yet.   -TSH D-dimer negative  -EKG sinus tachycardia, troponin negative  -Heart rate currently improved; suspect this is related to treatment of SIRS  Syncope  -Patient reported several syncopal episodes at home  -CT head without-acute abnormality  -Echo ordered  Congenital hydrocephalus s/p  shunt

## 2023-11-06 NOTE — PROGRESS NOTES
Arrived to place PICC line with bedside RN Irene. Pre-procedure and timeout done with RN, discussed limitations of placement and allergies. Cleaned with chloraprep, catheter trimmed and  guidewire inserted and advanced smoothly blood was free flowing and non-pulsatile from introducer. G PICC line verified with 3CG technology with peaked P-waves (please see image below). PICC tip terminates in the SVC according to [employer] 3CG tip confirmation system. PICC was seen dropping into SVC with tip tracking technology and discernable peaked p waves were noted without negative deflection. Please use new IV tubing when connecting to the newly placed central line. Post procedure - reorganized pt table, placed pt in lowest position, with call light and educated on line care. Reported off to bedside RN. Please call if you have any questions about the PICC or ML. The  will direct you to the PICC RN that is on call.       [phone]

## 2023-11-06 NOTE — PROGRESS NOTES
Infectious Disease Follow up Notes  Admit Date: 11/3/2023  Hospital Day: 4    Antibiotics :   IV Cefepime     CHIEF COMPLAINT:     Abd pain   WBC elevation   Palpitations  H/o  shunt in place  ADHD    Subjective interval History :  41 y.o. woman with ADHD,  shunt in place, Prediabetes, ADHD, admitted to hospital with Rt side abd pain and WBC elevation and palpitations has seen cardiology recently and now on admit noted to have WBC elevation now improved and blood cx -ve no fever in Hospital h/o ESBL urine colonization in the past and has multiple ED visits            Past Medical History:    Past Medical History:   Diagnosis Date    ADHD (attention deficit hyperactivity disorder) 1988    Depression with anxiety     Difficult intubation     airway swelled up    Drug-seeking behavior     Encounter for imaging to screen for metal prior to MRI 06/01/2021    MRI Conditional Medtronic Non-Programmable shunt model#17320 implanted 10/30/2020 at [facility]. Normal Mode. 1.5T or 3.0T. ESBL (extended spectrum beta-lactamase) producing bacteria infection 11/06/2019    urine    Functional ovarian cysts 2008    rt ovary cyst x 2 yrs.     GERD (gastroesophageal reflux disease) When I was a kid    Headache(784.0)     migraines    History of blood transfusion     at birth    History of kidney stones     History of PCOS     had hysterectomy in 2016    Hydrocephalus [facility])     Hyperlipidemia     Irritable bowel syndrome 2004    had colonoscopy about 6 yrs ago    Meningitis 01/9774    Neutrophilic leukocytosis     Nicotine dependence     PONV (postoperative nausea and vomiting)     very nauseated and sometimes wakes up with a Migraine--happened once after brain surgery    Prediabetes     Primary osteoarthritis of left knee 07/01/2016    S/P cone biopsy of cervix 2004    Scoliosis 1990.s    Seizures ([address])     twice--last one in June2022

## 2023-11-06 NOTE — PLAN OF CARE
Problem: Discharge Planning  Goal: Discharge to home or other facility with appropriate resources  11/5/2023 2352 by Earl Tenorio RN  Outcome: Progressing  11/5/2023 1829 by Lucia Burgos RN  Outcome: Progressing     Problem: Safety - Adult  Goal: Free from fall injury  11/5/2023 2352 by Earl Tenorio RN  Outcome: Progressing  11/5/2023 [address] [facility] [address] by Lucia Burgos RN  Outcome: Progressing     Problem: Pain  Goal: Verbalizes/displays adequate comfort level or baseline comfort level  11/5/2023 2352 by Earl Tenorio RN  Outcome: Progressing  11/5/2023 1829 by Lucia Burgos RN  Outcome: Progressing     Problem: Infection - Adult  Goal: Absence of infection at discharge  Outcome: Progressing  Goal: Absence of infection during hospitalization  Outcome: Progressing  Goal: Absence of fever/infection during anticipated neutropenic period  Outcome: Progressing

## 2023-11-06 NOTE — FLOWSHEET NOTE
Patient has been on call light frequently so far this shift. Has several requests for med changes, NP notified with new orders to change Benadryl from 50 MG P.O. to IV 12.5 and P.O. 25 mg. Patient updated and IV Benadryl was given. Patient noted with red area on abdomen from tele-monitor leads. Replaced all hypo allergic leads and cleansed the skin. Patient is worried her shunt is infected, NP notified with no new orders. Patient has a working PIV but is requesting a second one to be placed for her comfort if one goes bad she has a \"back up. \"

## 2023-11-06 NOTE — PROGRESS NOTES
Physician Progress Note      Lennie Ward  CSN #:                  [id number]  :                       1980  ADMIT DATE:       11/3/2023 1:59 PM  [address] [facility] DATE:  RESPONDING  PROVIDER #:        Matthias Jones MD          QUERY TEXT:    Pt admitted with Abd pain. Noted documentation of Sepsis on  by ordered   ID consultant. If possible, please document in progress notes and discharge   summary:    The medical record reflects the following:  Risk Factors: SIRS  Clinical Indicators: H&P  \"SIRS\" General surgery consulted  \"Sepsis   secondary to suspected bacteremia\" ID Consult  \"Sepsis on admission with   tachycardia, tachypnea, leukocytosis and elevated lactic acid level of 2.7\"   WBC 18.9, LA 2.7, , RR 27  Treatment: IV Cefepime, Labs  Options provided:  -- Sepsis confirmed present on admission  -- Sepsis ruled out  -- Other - I will add my own diagnosis  -- Disagree - Not applicable / Not valid  -- Disagree - Clinically unable to determine / Unknown  -- Refer to Clinical Documentation Reviewer    PROVIDER RESPONSE TEXT:    The diagnosis of Sepsis was confirmed as present on admission.     Query created by: Rita Reid on 2023 1:48 PM      Electronically signed by:  Matthias Jones MD 2023 2:08 PM

## 2023-11-07 LAB
ALBUMIN SERPL-MCNC: 4 G/DL (ref 3.4–5)
ALBUMIN/GLOB SERPL: 1.5 {RATIO} (ref 1.1–2.2)
ALP SERPL-CCNC: 191 U/L (ref 40–129)
ALT SERPL-CCNC: 472 U/L (ref 10–40)
ANION GAP SERPL CALCULATED.3IONS-SCNC: 9 MMOL/L (ref 3–16)
AST SERPL-CCNC: 337 U/L (ref 15–37)
BACTERIA BLD CULT ORG #2: NORMAL
BASOPHILS # BLD: 0 K/UL (ref 0–0.2)
BASOPHILS NFR BLD: 0.5 %
BILIRUB SERPL-MCNC: 0.9 MG/DL (ref 0–1)
BUN SERPL-MCNC: 16 MG/DL (ref 7–20)
CALCIUM SERPL-MCNC: 9 MG/DL (ref 8.3–10.6)
CHLORIDE SERPL-SCNC: 100 MMOL/L (ref 99–110)
CO2 SERPL-SCNC: 31 MMOL/L (ref 21–32)
CREAT SERPL-MCNC: 0.7 MG/DL (ref 0.6–1.1)
CRP SERPL-MCNC: 3.2 MG/L (ref 0–5.1)
DEPRECATED RDW RBC AUTO: 14.4 % (ref 12.4–15.4)
EOSINOPHIL # BLD: 0.2 K/UL (ref 0–0.6)
EOSINOPHIL NFR BLD: 3.2 %
GFR SERPLBLD CREATININE-BSD FMLA CKD-EPI: >60 ML/MIN/{1.73_M2}
GLUCOSE BLD-MCNC: 124 MG/DL (ref 70–99)
GLUCOSE BLD-MCNC: 126 MG/DL (ref 70–99)
GLUCOSE BLD-MCNC: 145 MG/DL (ref 70–99)
GLUCOSE BLD-MCNC: 172 MG/DL (ref 70–99)
GLUCOSE SERPL-MCNC: 142 MG/DL (ref 70–99)
HCT VFR BLD AUTO: 40 % (ref 36–48)
HGB BLD-MCNC: 13.6 G/DL (ref 12–16)
LYMPHOCYTES # BLD: 1.8 K/UL (ref 1–5.1)
LYMPHOCYTES NFR BLD: 26.4 %
MCH RBC QN AUTO: 29.3 PG (ref 26–34)
MCHC RBC AUTO-ENTMCNC: 34.1 G/DL (ref 31–36)
MCV RBC AUTO: 85.9 FL (ref 80–100)
MONOCYTES # BLD: 0.5 K/UL (ref 0–1.3)
MONOCYTES NFR BLD: 6.8 %
NEUTROPHILS # BLD: 4.4 K/UL (ref 1.7–7.7)
NEUTROPHILS NFR BLD: 63.1 %
PERFORMED ON: ABNORMAL
PLATELET # BLD AUTO: 206 K/UL (ref 135–450)
PMV BLD AUTO: 8.3 FL (ref 5–10.5)
POTASSIUM SERPL-SCNC: 3.9 MMOL/L (ref 3.5–5.1)
PROCALCITONIN SERPL IA-MCNC: 0.21 NG/ML (ref 0–0.15)
PROT SERPL-MCNC: 6.6 G/DL (ref 6.4–8.2)
RBC # BLD AUTO: 4.66 M/UL (ref 4–5.2)
SODIUM SERPL-SCNC: 140 MMOL/L (ref 136–145)
WBC # BLD AUTO: 7 K/UL (ref 4–11)

## 2023-11-07 PROCEDURE — 6360000002 HC RX W HCPCS: Performed by: FAMILY MEDICINE

## 2023-11-07 PROCEDURE — 6370000000 HC RX 637 (ALT 250 FOR IP): Performed by: STUDENT IN AN ORGANIZED HEALTH CARE EDUCATION/TRAINING PROGRAM

## 2023-11-07 PROCEDURE — 80053 COMPREHEN METABOLIC PANEL: CPT

## 2023-11-07 PROCEDURE — 2580000003 HC RX 258: Performed by: FAMILY MEDICINE

## 2023-11-07 PROCEDURE — 84145 PROCALCITONIN (PCT): CPT

## 2023-11-07 PROCEDURE — 86140 C-REACTIVE PROTEIN: CPT

## 2023-11-07 PROCEDURE — 85025 COMPLETE CBC W/AUTO DIFF WBC: CPT

## 2023-11-07 PROCEDURE — 94760 N-INVAS EAR/PLS OXIMETRY 1: CPT

## 2023-11-07 PROCEDURE — 6370000000 HC RX 637 (ALT 250 FOR IP): Performed by: INTERNAL MEDICINE

## 2023-11-07 PROCEDURE — 6360000002 HC RX W HCPCS: Performed by: STUDENT IN AN ORGANIZED HEALTH CARE EDUCATION/TRAINING PROGRAM

## 2023-11-07 PROCEDURE — 6370000000 HC RX 637 (ALT 250 FOR IP): Performed by: FAMILY MEDICINE

## 2023-11-07 PROCEDURE — 1200000000 HC SEMI PRIVATE

## 2023-11-07 PROCEDURE — 99232 SBSQ HOSP IP/OBS MODERATE 35: CPT | Performed by: INTERNAL MEDICINE

## 2023-11-07 PROCEDURE — 2580000003 HC RX 258: Performed by: STUDENT IN AN ORGANIZED HEALTH CARE EDUCATION/TRAINING PROGRAM

## 2023-11-07 RX ORDER — PROMETHAZINE HYDROCHLORIDE 25 MG/1
25 SUPPOSITORY RECTAL EVERY 6 HOURS PRN
Status: DISCONTINUED | OUTPATIENT
Start: 2023-11-07 | End: 2023-11-10 | Stop reason: HOSPADM

## 2023-11-07 RX ORDER — PROMETHAZINE HYDROCHLORIDE 25 MG/1
25 TABLET ORAL EVERY 6 HOURS PRN
Status: DISCONTINUED | OUTPATIENT
Start: 2023-11-07 | End: 2023-11-10 | Stop reason: HOSPADM

## 2023-11-07 RX ADMIN — SODIUM CHLORIDE, PRESERVATIVE FREE 10 ML: 5 INJECTION INTRAVENOUS at 22:36

## 2023-11-07 RX ADMIN — TRIAMCINOLONE ACETONIDE: 1 CREAM TOPICAL at 22:35

## 2023-11-07 RX ADMIN — ENOXAPARIN SODIUM 40 MG: 100 INJECTION SUBCUTANEOUS at 08:43

## 2023-11-07 RX ADMIN — DIPHENHYDRAMINE HCL 25 MG: 25 TABLET ORAL at 22:34

## 2023-11-07 RX ADMIN — HYDROMORPHONE HYDROCHLORIDE 1 MG: 1 INJECTION, SOLUTION INTRAMUSCULAR; INTRAVENOUS; SUBCUTANEOUS at 02:35

## 2023-11-07 RX ADMIN — DIPHENHYDRAMINE HYDROCHLORIDE 25 MG: 50 INJECTION INTRAMUSCULAR; INTRAVENOUS at 01:30

## 2023-11-07 RX ADMIN — GABAPENTIN 400 MG: 400 CAPSULE ORAL at 08:42

## 2023-11-07 RX ADMIN — LAMOTRIGINE 100 MG: 100 TABLET ORAL at 08:42

## 2023-11-07 RX ADMIN — HYDROMORPHONE HYDROCHLORIDE 1 MG: 1 INJECTION, SOLUTION INTRAMUSCULAR; INTRAVENOUS; SUBCUTANEOUS at 10:44

## 2023-11-07 RX ADMIN — SODIUM CHLORIDE, PRESERVATIVE FREE 10 ML: 5 INJECTION INTRAVENOUS at 22:34

## 2023-11-07 RX ADMIN — TRIAMCINOLONE ACETONIDE: 1 CREAM TOPICAL at 08:47

## 2023-11-07 RX ADMIN — PROMETHAZINE HYDROCHLORIDE 25 MG: 25 TABLET ORAL at 08:42

## 2023-11-07 RX ADMIN — ONDANSETRON HYDROCHLORIDE 4 MG: 4 TABLET, FILM COATED ORAL at 05:21

## 2023-11-07 RX ADMIN — HYDROMORPHONE HYDROCHLORIDE 1 MG: 1 INJECTION, SOLUTION INTRAMUSCULAR; INTRAVENOUS; SUBCUTANEOUS at 06:41

## 2023-11-07 RX ADMIN — CEFEPIME 2000 MG: 2 INJECTION, POWDER, FOR SOLUTION INTRAVENOUS at 09:53

## 2023-11-07 RX ADMIN — PROMETHAZINE HYDROCHLORIDE 25 MG: 25 TABLET ORAL at 22:33

## 2023-11-07 RX ADMIN — CEFEPIME 2000 MG: 2 INJECTION, POWDER, FOR SOLUTION INTRAVENOUS at 17:37

## 2023-11-07 RX ADMIN — ONDANSETRON HYDROCHLORIDE 4 MG: 4 TABLET, FILM COATED ORAL at 17:36

## 2023-11-07 RX ADMIN — HYDROMORPHONE HYDROCHLORIDE 1 MG: 1 INJECTION, SOLUTION INTRAMUSCULAR; INTRAVENOUS; SUBCUTANEOUS at 22:37

## 2023-11-07 RX ADMIN — HYDROMORPHONE HYDROCHLORIDE 1 MG: 1 INJECTION, SOLUTION INTRAMUSCULAR; INTRAVENOUS; SUBCUTANEOUS at 18:33

## 2023-11-07 RX ADMIN — CEFEPIME 2000 MG: 2 INJECTION, POWDER, FOR SOLUTION INTRAVENOUS at 01:30

## 2023-11-07 RX ADMIN — HYDROMORPHONE HYDROCHLORIDE 1 MG: 1 INJECTION, SOLUTION INTRAMUSCULAR; INTRAVENOUS; SUBCUTANEOUS at 14:27

## 2023-11-07 RX ADMIN — GABAPENTIN 400 MG: 400 CAPSULE ORAL at 22:34

## 2023-11-07 RX ADMIN — PROMETHAZINE HYDROCHLORIDE 25 MG: 25 TABLET ORAL at 14:27

## 2023-11-07 ASSESSMENT — PAIN DESCRIPTION - DESCRIPTORS
DESCRIPTORS: SORE;SHARP
DESCRIPTORS: SHARP
DESCRIPTORS: SHARP
DESCRIPTORS: SHARP;SHOOTING

## 2023-11-07 ASSESSMENT — PAIN DESCRIPTION - LOCATION
LOCATION: ABDOMEN

## 2023-11-07 ASSESSMENT — PAIN SCALES - GENERAL
PAINLEVEL_OUTOF10: 9
PAINLEVEL_OUTOF10: 8
PAINLEVEL_OUTOF10: 10
PAINLEVEL_OUTOF10: 9
PAINLEVEL_OUTOF10: 8
PAINLEVEL_OUTOF10: 9

## 2023-11-07 ASSESSMENT — PAIN - FUNCTIONAL ASSESSMENT: PAIN_FUNCTIONAL_ASSESSMENT: ACTIVITIES ARE NOT PREVENTED

## 2023-11-07 ASSESSMENT — PAIN DESCRIPTION - ORIENTATION
ORIENTATION: RIGHT
ORIENTATION: MID
ORIENTATION: RIGHT

## 2023-11-07 NOTE — PROGRESS NOTES
pancreas, and stomach are without acute process. 2 mm right inferior pole nonobstructive calculus. The ureters are nondilated. The bladder is within normal limits. GI/Bowel: Mild diverticulosis of the distal colon. The appendix is within normal limits. No evidence of bowel obstruction. Pelvis: Status post hysterectomy. No adnexal mass. Peritoneum/Retroperitoneum: Presumed  shunt tubing is noted. Patent vasculature. No lymphadenopathy. Mild atherosclerosis. No free fluid or free air. Tiny fat containing periumbilical hernia. Bones/Soft Tissues: No acute osseous abnormality. 1. No acute process in the abdomen or pelvis. 2. 2 mm nonobstructive right renal calculus. XR SHUNT SERIES PLACEMENT (<4 VIEWS)    Result Date: 11/3/2023  EXAMINATION: SHUNT SERIES 11/3/2023 3:13 pm COMPARISON: None. HISTORY: ORDERING SYSTEM PROVIDED HISTORY: eval shunt TECHNOLOGIST PROVIDED HISTORY: Reason for exam:->eval shunt Reason for Exam: eval shunt      shunt catheter with tip near midline on the left. Intracranial portion of the catheter is intact. The extracranial portion of the catheter is also intact. Catheter is followed inferiorly through than the left neck and chest extending into the abdomen and pelvis. No evidence of fracture. The lungs appear clear. Heart and mediastinum are normal.  Visualized loops of bowel unremarkable. RECOMMENDATION:  shunt catheter is intact with no evidence of fracture. No other significant     XR CHEST (2 VW)    Result Date: 11/3/2023  EXAMINATION: TWO XRAY VIEWS OF THE CHEST 11/3/2023 2:58 pm COMPARISON: CXR dated 10/26/2023 HISTORY: ORDERING SYSTEM PROVIDED HISTORY: tachycardia TECHNOLOGIST PROVIDED HISTORY: Reason for exam:->tachycardia Reason for Exam: tachycardia FINDINGS: Medical devices: Partial visualization of  shunt along the left hemithorax and crossing over to the right upper abdomen. Mediastinum/Heart: Rightward scoliosis of the thoracic spine.   The heart

## 2023-11-07 NOTE — CARE COORDINATION
Case Management Assessment  Initial Evaluation    Date/Time of Evaluation: 11/7/2023 10:53 AM  Assessment Completed by: TRACE Ashraf    If patient is discharged prior to next notation, then this note serves as note for discharge by case management. Patient Name: Lynn Turcios                   YOB: 1980  Diagnosis: Tachycardia [R00.0]  Generalized abdominal pain [R10.84]  SIRS (systemic inflammatory response syndrome) (HCC) [R65.10]  Severe sepsis ([address]) [A41.9, R65.20]  Leukocytosis, unspecified type [D72.829]                   Date / Time: 11/3/2023  1:59 PM    Patient Admission Status: Inpatient   Readmission Risk (Low < 19, Mod (19-27), High > 27): Readmission Risk Score: 12.6    Current PCP: FREDRICK Ely  PCP verified by CM? (P) Yes (FREDRICK Ely)    Chart Reviewed: Yes      History Provided by: (P) Patient  Patient Orientation: (P) Alert and Oriented    Patient Cognition: (P) Alert    Hospitalization in the last 30 days (Readmission):  No    If yes, Readmission Assessment in CM Navigator will be completed.     Advance Directives:      Code Status: Full Code   Patient's Primary Decision Maker is: (P) Patient Declined (Legal Next of Kin Remains as Decision Maker)      Discharge Planning:    Patient lives with: (P) Children, Parent Type of Home: (P) Trailer/Mobile Home  Primary Care Giver: (P) Self  Patient Support Systems include: (P) Parent, Children   Current Financial resources: (P) Medicaid  Current community resources: (P) ECF/Home Care  Current services prior to admission: (P) None            Current DME:              Type of Home Care services:  (P) IV Therapy, Nursing Services    ADLS  Prior functional level: (P) Independent in ADLs/IADLs  Current functional level: (P) Independent in ADLs/IADLs    PT AM-PAC:   /24  OT AM-PAC:   /24    Family can provide assistance at DC: (P) Yes  Would you like Case Management to discuss the discharge plan with any other inflammatory response syndrome) (HCC) [R65.10]  Severe sepsis ([address]) [A41.9, R65.20]  Leukocytosis, unspecified type [B70.060]    IF APPLICABLE: The Patient and/or patient representative Petrona and her family were provided with a choice of provider and agrees with the discharge plan. Freedom of choice list with basic dialogue that supports the patient's individualized plan of care/goals and shares the quality data associated with the providers was provided to: (P) Patient   Patient Representative Name:       The Patient and/or Patient Representative Agree with the Discharge Plan?  (P) Yes    TRACE Rizzo  Case Management Department  Ph: [phone]    Electronically signed by TRACE Rizzo on 11/7/2023 at 11:27 AM

## 2023-11-07 NOTE — PROGRESS NOTES
Infectious Disease Follow up Notes  Admit Date: 11/3/2023  Hospital Day: 5    Antibiotics :   IV Cefepime     CHIEF COMPLAINT:     Abd pain   WBC elevation   Palpitations  H/o  shunt in place  ADHD    Subjective interval History :  41 y.o. woman with ADHD,  shunt in place, Prediabetes, ADHD, admitted to hospital with Rt side abd pain and WBC elevation and palpitations has seen cardiology recently and now on admit noted to have WBC elevation now improved and blood cx -ve no fever in Hospital    MRI Abd Negative and vomiting and nausea today LFT elevation noted        Past Medical History:    Past Medical History:   Diagnosis Date    ADHD (attention deficit hyperactivity disorder) 1988    Depression with anxiety     Difficult intubation     airway swelled up    Drug-seeking behavior     Encounter for imaging to screen for metal prior to MRI 06/01/2021    MRI Conditional Medtronic Non-Programmable shunt model#88968 implanted 10/30/2020 at [facility]. Normal Mode. 1.5T or 3.0T. ESBL (extended spectrum beta-lactamase) producing bacteria infection 11/06/2019    urine    Functional ovarian cysts 2008    rt ovary cyst x 2 yrs.     GERD (gastroesophageal reflux disease) When I was a kid    Headache(784.0)     migraines    History of blood transfusion     at birth    History of kidney stones     History of PCOS     had hysterectomy in 2016    Hydrocephalus [facility])     Hyperlipidemia     Irritable bowel syndrome 2004    had colonoscopy about 6 yrs ago    Meningitis 45/6137    Neutrophilic leukocytosis     Nicotine dependence     PONV (postoperative nausea and vomiting)     very nauseated and sometimes wakes up with a Migraine--happened once after brain surgery    Prediabetes     Primary osteoarthritis of left knee 07/01/2016    S/P cone biopsy of cervix 2004    Scoliosis 1990.s    Seizures ([address])     twice--last one in June2022     tests/interventions  Independent review of radiologic images  Microbiology cultures and other micro tests reviewed     Risk of Complications/Morbidity: High      Illness(es)/ Infection present that pose threat to bodily function. There is potential for severe exacerbation of infection/side effects of treatment. Therapy requires intensive monitoring for antimicrobial agent toxicity. Thanks for allowing me to participate in your patient's care and please call me with any questions or concerns.     Brook Armendariz MD  Infectious Disease  [facility] Physician  Phone: [phone]   Fax : [phone]

## 2023-11-07 NOTE — PROGRESS NOTES
V2.0  [facility] [place]      Name:  Tammy Griffith /Age/Sex: 1980  (41 y.o. female)   MRN & CSN:  [id number] & [id number] Encounter Date/Time: 2023 8:28 AM EDT    Location:  [id number]-6203/9236-99 PCP: Markus Tariq Day: 5    Assessment and Plan:   Tammy Griffith is a 41 y.o. female with congenital hydrocephalus s/p  shunt presenting with SIRS of unclear etiology    Plan:  SIRS - improved  -On presentation: Fever to 102.3 at home. Tachycardia to 130s. Leukocytosis to 18.9 with neutrophil predominance. Now remains normalized  -Concern for infection, however only localizing factor with patient is her right-sided abdominal pain. -Trying to eat dry toast this morning        -ID consulted, appreciate any input        -Day 5 of antibiotics with cefepime  Right-sided abdominal pain  -On exam tenderness wraps along right thigh down to the lower abdomen and moves midline. Notably this corresponds with her  shunt on CT imaging.  -Additional abdominal fullness today. We will obtain repeat imaging  -General surgery consulted; feel this is not likely related to the shunt. No apparent surgical intervention  -From chart review, patient frequently is in the ED with abdominal pain. Discussed with patient that if this is related to her shunt, and may end up being more of a long-term issue. Felt that she may benefit from establishing with pain management at discharge. Patient apprehensive stating that she does not want to become addicted. Tachycardia - improved  -Has noticed tachycardia on her Apple Watch intermittently over a few weeks  -Being worked up by Dr. Marylee Olmstead cardiology. Holter monitor ordered yesterday but has not received yet.   -TSH D-dimer negative  -EKG sinus tachycardia, troponin negative  -Heart rate currently improved; suspect this is related to treatment of SIRS  Syncope  -Patient reported several syncopal episodes at home  -CT head without-acute over the last few days which has resolved. Seen by cardiology office day prior to presentation for dizziness/heart racing. Plan for Holter monitor endorses nausea without emesis. CT imaging unremarkable. Laboratory work-up notable for elevated white count and lactic acid    Review of Systems:    Review of Systems    10 point ROS negative except as stated above in \"subjective\" section    Objective: Intake/Output Summary (Last 24 hours) at 11/7/2023 1544  Last data filed at 11/7/2023 0827  Gross per 24 hour   Intake 1243.04 ml   Output --   Net 1243.04 ml          Vitals:   Vitals:    11/07/23 1114   BP:    Pulse:    Resp: 16   Temp:    SpO2:        Physical Exam:     General: NAD  Eyes: EOMI  ENT: neck supple  Cardiovascular: Regular rate. Respiratory: Clear to auscultation  Gastrointestinal:, Mildly distended. , tenderness along right side of abdomen wrapping around lower abdomen medially; no guarding  Musculoskeletal: No edema  Skin: Excoriations across chest, patient frequently scratching. Patches of redness corresponding with telemetry leads stickers in lower abdomen. Midline redness and tenderness from sternum towards umbilicus  Neuro: Alert. Psych: Mood appropriate.      Medications:   Medications:    lidocaine 1 % injection  5 mL IntraDERmal Once    sodium chloride flush  5-40 mL IntraVENous 2 times per day    triamcinolone   Topical BID    atorvastatin  10 mg Oral Nightly    gabapentin  400 mg Oral BID    insulin lispro  0-8 Units SubCUTAneous TID WC    insulin lispro  0-4 Units SubCUTAneous Nightly    sodium chloride flush  5-40 mL IntraVENous 2 times per day    enoxaparin  40 mg SubCUTAneous Daily    cefepime  2,000 mg IntraVENous Q8H    lamoTRIgine  100 mg Oral Daily      Infusions:    sodium chloride      dextrose       PRN Meds: promethazine, 25 mg, Q6H PRN  promethazine, 25 mg, Q6H PRN  diphenhydrAMINE, 25 mg, Q6H PRN   Or  diphenhydrAMINE, 25 mg, Q6H PRN  sodium chloride flush, 5-40 mL,

## 2023-11-07 NOTE — PLAN OF CARE
Problem: Discharge Planning  Goal: Discharge to home or other facility with appropriate resources  Outcome: Progressing     Problem: Safety - Adult  Goal: Free from fall injury  Outcome: Progressing     Problem: Pain  Goal: Verbalizes/displays adequate comfort level or baseline comfort level  Outcome: Progressing     Problem: Infection - Adult  Goal: Absence of infection at discharge  Outcome: Progressing  Goal: Absence of infection during hospitalization  Outcome: Progressing  Goal: Absence of fever/infection during anticipated neutropenic period  Outcome: Progressing     Problem: Metabolic/Fluid and Electrolytes - Adult  Goal: Electrolytes maintained within normal limits  Outcome: Progressing  Goal: Hemodynamic stability and optimal renal function maintained  Outcome: Progressing

## 2023-11-07 NOTE — PLAN OF CARE
Problem: Discharge Planning  Goal: Discharge to home or other facility with appropriate resources  11/7/2023 1129 by Jason Farmer RN  Outcome: Progressing  Flowsheets (Taken 11/7/2023 1126)  Discharge to home or other facility with appropriate resources:   Identify barriers to discharge with patient and caregiver   Arrange for needed discharge resources and transportation as appropriate   Identify discharge learning needs (meds, wound care, etc)   Arrange for interpreters to assist at discharge as needed   Refer to discharge planning if patient needs post-hospital services based on physician order or complex needs related to functional status, cognitive ability or social support system  11/6/2023 2253 by Luna Hodgkins, RN  Outcome: Progressing     Problem: Safety - Adult  Goal: Free from fall injury  11/7/2023 1129 by Jason Farmer RN  Outcome: Progressing  11/6/2023 2253 by Luna Hodgkins, RN  Outcome: Progressing     Problem: Pain  Goal: Verbalizes/displays adequate comfort level or baseline comfort level  11/7/2023 1129 by Jason Farmer RN  Outcome: Progressing  11/6/2023 2253 by Luna Hodgkins, RN  Outcome: Progressing     Problem: Infection - Adult  Goal: Absence of infection at discharge  11/7/2023 1129 by Jason Farmer RN  Outcome: Progressing  Flowsheets (Taken 11/7/2023 1126)  Absence of infection at discharge:   Assess and monitor for signs and symptoms of infection   Monitor lab/diagnostic results   Monitor all insertion sites i.e., indwelling lines, tubes and drains  11/6/2023 2253 by Luna Hodgkins, RN  Outcome: Progressing  Goal: Absence of infection during hospitalization  11/7/2023 1129 by Jason Farmer RN  Outcome: Progressing  Flowsheets (Taken 11/7/2023 1126)  Absence of infection during hospitalization:   Assess and monitor for signs and symptoms of infection   Monitor lab/diagnostic results   Monitor all insertion sites i.e., indwelling lines, tubes and drains  11/6/2023 2253 by Francisco Gregory RN  Outcome: Progressing  Goal: Absence of fever/infection during anticipated neutropenic period  11/7/2023 1129 by Nelly Murray RN  Outcome: Progressing  Flowsheets (Taken 11/7/2023 1126)  Absence of fever/infection during anticipated neutropenic period:   Monitor white blood cell count   Administer growth factors as ordered  11/6/2023 2253 by Francisco Gregory RN  Outcome: Progressing     Problem: Metabolic/Fluid and Electrolytes - Adult  Goal: Electrolytes maintained within normal limits  11/7/2023 1129 by Nelly Murray RN  Outcome: Progressing  Flowsheets (Taken 11/7/2023 1126)  Electrolytes maintained within normal limits:   Monitor labs and assess patient for signs and symptoms of electrolyte imbalances   Administer electrolyte replacement as ordered   Monitor response to electrolyte replacements, including repeat lab results as appropriate   Instruct patient on fluid and nutrition restrictions as appropriate   Fluid restriction as ordered  11/6/2023 2253 by Francisco Gregory RN  Outcome: Progressing  Goal: Hemodynamic stability and optimal renal function maintained  11/7/2023 1129 by Nelly Murray RN  Outcome: Progressing  Flowsheets (Taken 11/7/2023 1126)  Hemodynamic stability and optimal renal function maintained:   Monitor labs and assess for signs and symptoms of volume excess or deficit   Monitor intake, output and patient weight   Monitor urine specific gravity, serum osmolarity and serum sodium as indicated or ordered   Encourage oral intake as appropriate  11/6/2023 2253 by Francisco Gregory RN  Outcome: Progressing     Problem: ABCDS Injury Assessment  Goal: Absence of physical injury  Outcome: Progressing

## 2023-11-08 LAB
ALBUMIN SERPL-MCNC: 4.1 G/DL (ref 3.4–5)
ALBUMIN/GLOB SERPL: 1.5 {RATIO} (ref 1.1–2.2)
ALP SERPL-CCNC: 191 U/L (ref 40–129)
ALT SERPL-CCNC: 322 U/L (ref 10–40)
ANION GAP SERPL CALCULATED.3IONS-SCNC: 8 MMOL/L (ref 3–16)
AST SERPL-CCNC: 97 U/L (ref 15–37)
BILIRUB SERPL-MCNC: 0.9 MG/DL (ref 0–1)
BUN SERPL-MCNC: 10 MG/DL (ref 7–20)
CALCIUM SERPL-MCNC: 9.4 MG/DL (ref 8.3–10.6)
CHLORIDE SERPL-SCNC: 98 MMOL/L (ref 99–110)
CK SERPL-CCNC: 30 U/L (ref 26–192)
CO2 SERPL-SCNC: 31 MMOL/L (ref 21–32)
CREAT SERPL-MCNC: 0.6 MG/DL (ref 0.6–1.1)
GFR SERPLBLD CREATININE-BSD FMLA CKD-EPI: >60 ML/MIN/{1.73_M2}
GLUCOSE BLD-MCNC: 106 MG/DL (ref 70–99)
GLUCOSE BLD-MCNC: 118 MG/DL (ref 70–99)
GLUCOSE BLD-MCNC: 156 MG/DL (ref 70–99)
GLUCOSE BLD-MCNC: 192 MG/DL (ref 70–99)
GLUCOSE SERPL-MCNC: 164 MG/DL (ref 70–99)
PERFORMED ON: ABNORMAL
POTASSIUM SERPL-SCNC: 3.3 MMOL/L (ref 3.5–5.1)
PROT SERPL-MCNC: 6.9 G/DL (ref 6.4–8.2)
SODIUM SERPL-SCNC: 137 MMOL/L (ref 136–145)

## 2023-11-08 PROCEDURE — 82550 ASSAY OF CK (CPK): CPT

## 2023-11-08 PROCEDURE — 6370000000 HC RX 637 (ALT 250 FOR IP): Performed by: FAMILY MEDICINE

## 2023-11-08 PROCEDURE — 6360000002 HC RX W HCPCS: Performed by: STUDENT IN AN ORGANIZED HEALTH CARE EDUCATION/TRAINING PROGRAM

## 2023-11-08 PROCEDURE — 6370000000 HC RX 637 (ALT 250 FOR IP): Performed by: STUDENT IN AN ORGANIZED HEALTH CARE EDUCATION/TRAINING PROGRAM

## 2023-11-08 PROCEDURE — 1200000000 HC SEMI PRIVATE

## 2023-11-08 PROCEDURE — 80053 COMPREHEN METABOLIC PANEL: CPT

## 2023-11-08 PROCEDURE — 6370000000 HC RX 637 (ALT 250 FOR IP): Performed by: INTERNAL MEDICINE

## 2023-11-08 PROCEDURE — 6360000002 HC RX W HCPCS: Performed by: FAMILY MEDICINE

## 2023-11-08 PROCEDURE — 2580000003 HC RX 258: Performed by: FAMILY MEDICINE

## 2023-11-08 PROCEDURE — 94760 N-INVAS EAR/PLS OXIMETRY 1: CPT

## 2023-11-08 PROCEDURE — 2580000003 HC RX 258: Performed by: STUDENT IN AN ORGANIZED HEALTH CARE EDUCATION/TRAINING PROGRAM

## 2023-11-08 RX ADMIN — ENOXAPARIN SODIUM 40 MG: 100 INJECTION SUBCUTANEOUS at 08:40

## 2023-11-08 RX ADMIN — PROMETHAZINE HYDROCHLORIDE 25 MG: 25 TABLET ORAL at 04:29

## 2023-11-08 RX ADMIN — SODIUM CHLORIDE, PRESERVATIVE FREE 10 ML: 5 INJECTION INTRAVENOUS at 19:56

## 2023-11-08 RX ADMIN — SODIUM CHLORIDE, PRESERVATIVE FREE 5 ML: 5 INJECTION INTRAVENOUS at 10:34

## 2023-11-08 RX ADMIN — ONDANSETRON HYDROCHLORIDE 4 MG: 4 TABLET, FILM COATED ORAL at 11:27

## 2023-11-08 RX ADMIN — HYDROMORPHONE HYDROCHLORIDE 1 MG: 1 INJECTION, SOLUTION INTRAMUSCULAR; INTRAVENOUS; SUBCUTANEOUS at 15:20

## 2023-11-08 RX ADMIN — DIPHENHYDRAMINE HCL 25 MG: 25 TABLET ORAL at 17:34

## 2023-11-08 RX ADMIN — CEFEPIME 2000 MG: 2 INJECTION, POWDER, FOR SOLUTION INTRAVENOUS at 10:32

## 2023-11-08 RX ADMIN — LAMOTRIGINE 100 MG: 100 TABLET ORAL at 08:40

## 2023-11-08 RX ADMIN — DIPHENHYDRAMINE HCL 25 MG: 25 TABLET ORAL at 11:27

## 2023-11-08 RX ADMIN — SODIUM CHLORIDE, PRESERVATIVE FREE 10 ML: 5 INJECTION INTRAVENOUS at 08:41

## 2023-11-08 RX ADMIN — HYDROMORPHONE HYDROCHLORIDE 1 MG: 1 INJECTION, SOLUTION INTRAMUSCULAR; INTRAVENOUS; SUBCUTANEOUS at 02:28

## 2023-11-08 RX ADMIN — GABAPENTIN 400 MG: 400 CAPSULE ORAL at 21:39

## 2023-11-08 RX ADMIN — HYDROMORPHONE HYDROCHLORIDE 1 MG: 1 INJECTION, SOLUTION INTRAMUSCULAR; INTRAVENOUS; SUBCUTANEOUS at 11:27

## 2023-11-08 RX ADMIN — HYDROMORPHONE HYDROCHLORIDE 1 MG: 1 INJECTION, SOLUTION INTRAMUSCULAR; INTRAVENOUS; SUBCUTANEOUS at 07:30

## 2023-11-08 RX ADMIN — HYDROMORPHONE HYDROCHLORIDE 1 MG: 1 INJECTION, SOLUTION INTRAMUSCULAR; INTRAVENOUS; SUBCUTANEOUS at 19:54

## 2023-11-08 RX ADMIN — CEFEPIME 2000 MG: 2 INJECTION, POWDER, FOR SOLUTION INTRAVENOUS at 18:01

## 2023-11-08 RX ADMIN — GABAPENTIN 400 MG: 400 CAPSULE ORAL at 08:40

## 2023-11-08 RX ADMIN — CEFEPIME 2000 MG: 2 INJECTION, POWDER, FOR SOLUTION INTRAVENOUS at 02:36

## 2023-11-08 ASSESSMENT — PAIN DESCRIPTION - ORIENTATION
ORIENTATION: RIGHT
ORIENTATION: RIGHT
ORIENTATION: RIGHT;UPPER
ORIENTATION: RIGHT
ORIENTATION: RIGHT;MID

## 2023-11-08 ASSESSMENT — PAIN - FUNCTIONAL ASSESSMENT: PAIN_FUNCTIONAL_ASSESSMENT: ACTIVITIES ARE NOT PREVENTED

## 2023-11-08 ASSESSMENT — PAIN DESCRIPTION - LOCATION
LOCATION: ABDOMEN

## 2023-11-08 ASSESSMENT — PAIN DESCRIPTION - PAIN TYPE: TYPE: ACUTE PAIN

## 2023-11-08 ASSESSMENT — PAIN DESCRIPTION - DESCRIPTORS
DESCRIPTORS: SHARP
DESCRIPTORS: SHARP
DESCRIPTORS: STABBING
DESCRIPTORS: SHARP

## 2023-11-08 ASSESSMENT — PAIN SCALES - GENERAL
PAINLEVEL_OUTOF10: 9
PAINLEVEL_OUTOF10: 10
PAINLEVEL_OUTOF10: 10
PAINLEVEL_OUTOF10: 9
PAINLEVEL_OUTOF10: 9

## 2023-11-08 ASSESSMENT — PAIN DESCRIPTION - ONSET: ONSET: ON-GOING

## 2023-11-08 ASSESSMENT — PAIN DESCRIPTION - FREQUENCY: FREQUENCY: CONTINUOUS

## 2023-11-08 NOTE — PLAN OF CARE
Problem: Discharge Planning  Goal: Discharge to home or other facility with appropriate resources  Outcome: Progressing     Problem: Safety - Adult  Goal: Free from fall injury  Outcome: Progressing     Problem: Pain  Goal: Verbalizes/displays adequate comfort level or baseline comfort level  Outcome: Progressing     Problem: Infection - Adult  Goal: Absence of infection at discharge  Outcome: Progressing  Goal: Absence of infection during hospitalization  Outcome: Progressing  Goal: Absence of fever/infection during anticipated neutropenic period  Outcome: Progressing     Problem: Metabolic/Fluid and Electrolytes - Adult  Goal: Electrolytes maintained within normal limits  Outcome: Progressing  Goal: Hemodynamic stability and optimal renal function maintained  Outcome: Progressing     Problem: ABCDS Injury Assessment  Goal: Absence of physical injury  Outcome: Progressing

## 2023-11-08 NOTE — PROGRESS NOTES
murmurs, rubs, clicks, or gallops, no carotid bruits  Abdomen: soft, non-tender, non-distended, normal bowel sounds, no masses or organomegaly scar from previous surgery ++   Extremities: no cyanosis, clubbing or edema  Musculoskeletal: normal range of motion, no joint swelling, deformity or tenderness  Integumentary: No rashes, no abnormal skin lesions, no petechiae  Neurologic: reflexes normal and symmetric, no cranial nerve deficit  Psych:  Orientation, sensorium, mood normal  Lines:  PICC      Data Review:    CBC:   Lab Results   Component Value Date    WBC 7.0 11/07/2023    HGB 13.6 11/07/2023    HCT 40.0 11/07/2023    MCV 85.9 11/07/2023     11/07/2023     RENAL:   Lab Results   Component Value Date    CREATININE 0.6 11/08/2023    BUN 10 11/08/2023     11/08/2023    K 3.3 (L) 11/08/2023    CL 98 (L) 11/08/2023    CO2 31 11/08/2023     SED RATE:   Lab Results   Component Value Date/Time    SEDRATE 15 11/03/2023 09:12 PM     CK:   Lab Results   Component Value Date/Time    CKTOTAL 30 11/08/2023 05:01 AM     CRP:   Lab Results   Component Value Date/Time    CRP 3.2 11/07/2023 07:23 AM     Hepatic Function Panel:   Lab Results   Component Value Date/Time    ALKPHOS 191 11/08/2023 05:01 AM     11/08/2023 05:01 AM    AST 97 11/08/2023 05:01 AM    PROT 6.9 11/08/2023 05:01 AM    PROT 7.3 08/14/2011 07:30 PM    BILITOT 0.9 11/08/2023 05:01 AM    BILIDIR <0.2 11/06/2023 06:20 AM    IBILI see below 11/06/2023 06:20 AM    LABALBU 4.1 11/08/2023 05:01 AM     UA:  Lab Results   Component Value Date/Time    COLORU Yellow 11/03/2023 04:19 PM    CLARITYU Clear 11/03/2023 04:19 PM    GLUCOSEU Negative 11/03/2023 04:19 PM    GLUCOSEU NEGATIVE 10/08/2011 10:10 PM    BILIRUBINUR Negative 11/03/2023 04:19 PM    BILIRUBINUR NEG 01/30/2023 04:31 PM    BILIRUBINUR NEGATIVE 10/08/2011 10:10 PM    KETUA Negative 11/03/2023 04:19 PM    SPECGRAV 1.009 11/03/2023 04:19 PM    BLOODU Negative 11/03/2023 04:19 PM    PHUR 400 mg Oral BID    insulin lispro  0-8 Units SubCUTAneous TID WC    insulin lispro  0-4 Units SubCUTAneous Nightly    sodium chloride flush  5-40 mL IntraVENous 2 times per day    enoxaparin  40 mg SubCUTAneous Daily    cefepime  2,000 mg IntraVENous Q8H    lamoTRIgine  100 mg Oral Daily       Continuous Infusions:   sodium chloride      dextrose         PRN Meds:  promethazine, promethazine, diphenhydrAMINE **OR** diphenhydrAMINE, sodium chloride flush, sodium chloride, HYDROmorphone **OR** HYDROmorphone, melatonin, hydrOXYzine HCl, ondansetron, perflutren lipid microspheres, glucose, dextrose bolus **OR** dextrose bolus, glucagon (rDNA), dextrose, sodium chloride flush, aluminum & magnesium hydroxide-simethicone      Assessment:     Patient Active Problem List   Diagnosis Code    Attention deficit hyperactivity disorder (ADHD) F90.9     (ventriculoperitoneal) shunt status Z98.2    Scoliosis M41.9    Prediabetes R73.03    Tobacco smoker Z72.0    Onychomycosis B35.1    Rectal bleeding K62.5    Hydrocephalus ([facility]) G91.9    Sepsis ([facility]) A41.9    Ureteritis N28.89    Acute cystitis without hematuria N30.00    Lactic acidosis E87.20    Leukocytosis D72.829    History of brain shunt Z98.2    Mood disorder ([facility]) F39    Numbness and tingling in left hand R20.0, R20.2    Diverticulosis K57.90    Colitis K52.9    Infection due to extended-spectrum beta-lactamase-producing Escherichia coli A49.8, Z16.12    Kidney stone N20.0    Intermittent small bowel obstruction due to adhesions ([facility]) K56.50    Abdominal wall mass R22.2    Cyst and pseudocyst of pancreas K86.2, K86.3    Abdominal pain in female R10.9    Nausea and vomiting R11.2    COVID U07.1    Abdominal wall cellulitis L03.311    DM2 (diabetes mellitus, type 2) ([facility]) E11.9    HTN (hypertension) I10    Cellulitis L03.90    History of extended-spectrum beta-lactamase producing Escherichia coli infection Z86.19    H/O laparoscopy Z98.890    Allergy to multiple antibiotics

## 2023-11-08 NOTE — PLAN OF CARE
Problem: Discharge Planning  Goal: Discharge to home or other facility with appropriate resources  11/8/2023 0842 by Jared Young RN  Outcome: Progressing     Problem: Safety - Adult  Goal: Free from fall injury  11/8/2023 0842 by Jared Young RN  Outcome: Progressing     Problem: Pain  Goal: Verbalizes/displays adequate comfort level or baseline comfort level  11/8/2023 0842 by Jared Young RN  Outcome: Progressing     Problem: Infection - Adult  Goal: Absence of infection at discharge  11/8/2023 0842 by Jared Young RN  Outcome: Progressing     Problem: Infection - Adult  Goal: Absence of infection during hospitalization  11/8/2023 0842 by Jared Young RN  Outcome: Progressing     Problem: Infection - Adult  Goal: Absence of fever/infection during anticipated neutropenic period  11/8/2023 0842 by Jared Young RN  Outcome: Progressing     Problem: Metabolic/Fluid and Electrolytes - Adult  Goal: Electrolytes maintained within normal limits  11/8/2023 0842 by Jared Young RN  Outcome: Progressing     Problem: Metabolic/Fluid and Electrolytes - Adult  Goal: Hemodynamic stability and optimal renal function maintained  11/8/2023 0842 by Jared Young RN  Outcome: Progressing     Problem: ABCDS Injury Assessment  Goal: Absence of physical injury  11/8/2023 0842 by Jared Young RN  Outcome: Progressing

## 2023-11-08 NOTE — PROGRESS NOTES
Physician Progress Note      PATIENTAlfred Party  CSN #:                  [id number]  :                       1980  ADMIT DATE:       11/3/2023 1:59 PM  [facility] DATE:  RESPONDING  PROVIDER #:        Edmundo Bailey MD          QUERY TEXT:    Patient admitted with abdominal pain. Noted documentation of sepsis in ID   consult note on 23 and in query response on 23. If possible, please   document in progress notes and discharge summary the source of sepsis:    The medical record reflects the following:  Risk Factors: SIRS, Hx-  shunt  Clinical Indicators: VS-  , RR 27. .... WBC 18.9, LA 2.7... Moiz Kaylynn Moizanu HallKaylynn H&P    \"SIRS\" General surgery consulted  \"Sepsis secondary to suspected   bacteremia\" ID Consult  \"Sepsis on admission with tachycardia, tachypnea,   leukocytosis and elevated lactic acid level of 2.7\"  Treatment: IV Cefepime, Labs, ID consult    Thank Ginny Padilla RN BSN CDS CRCR  [email or web address]  Options provided:  -- Sepsis, present on admission, due to, Please document source. -- Sepsis, not present on admission due to, Please document source. -- Sepsis, present on admission, now resolved, due to, Please document source.   -- Sepsis ruled out  -- Other - I will add my own diagnosis  -- Disagree - Not applicable / Not valid  -- Disagree - Clinically unable to determine / Unknown  -- Refer to Clinical Documentation Reviewer    PROVIDER RESPONSE TEXT:    This patient has sepsis which was present on admission due to Unknown source    Query created by: Michael Courtney on 2023 9:42 AM      Electronically signed by:  Edmundo Bailey MD 2023 1:10 PM

## 2023-11-09 LAB
GLUCOSE BLD-MCNC: 138 MG/DL (ref 70–99)
GLUCOSE BLD-MCNC: 144 MG/DL (ref 70–99)
GLUCOSE BLD-MCNC: 155 MG/DL (ref 70–99)
GLUCOSE BLD-MCNC: 93 MG/DL (ref 70–99)
PERFORMED ON: ABNORMAL
PERFORMED ON: NORMAL

## 2023-11-09 PROCEDURE — 94760 N-INVAS EAR/PLS OXIMETRY 1: CPT

## 2023-11-09 PROCEDURE — 2580000003 HC RX 258: Performed by: STUDENT IN AN ORGANIZED HEALTH CARE EDUCATION/TRAINING PROGRAM

## 2023-11-09 PROCEDURE — 6360000002 HC RX W HCPCS: Performed by: STUDENT IN AN ORGANIZED HEALTH CARE EDUCATION/TRAINING PROGRAM

## 2023-11-09 PROCEDURE — 99231 SBSQ HOSP IP/OBS SF/LOW 25: CPT | Performed by: INTERNAL MEDICINE

## 2023-11-09 PROCEDURE — 6370000000 HC RX 637 (ALT 250 FOR IP): Performed by: INTERNAL MEDICINE

## 2023-11-09 PROCEDURE — 6360000002 HC RX W HCPCS: Performed by: FAMILY MEDICINE

## 2023-11-09 PROCEDURE — 1200000000 HC SEMI PRIVATE

## 2023-11-09 PROCEDURE — 6370000000 HC RX 637 (ALT 250 FOR IP): Performed by: FAMILY MEDICINE

## 2023-11-09 PROCEDURE — 2580000003 HC RX 258: Performed by: FAMILY MEDICINE

## 2023-11-09 RX ADMIN — SODIUM CHLORIDE, PRESERVATIVE FREE 10 ML: 5 INJECTION INTRAVENOUS at 20:26

## 2023-11-09 RX ADMIN — CEFEPIME 2000 MG: 2 INJECTION, POWDER, FOR SOLUTION INTRAVENOUS at 16:33

## 2023-11-09 RX ADMIN — DIPHENHYDRAMINE HYDROCHLORIDE 25 MG: 50 INJECTION INTRAMUSCULAR; INTRAVENOUS at 00:07

## 2023-11-09 RX ADMIN — DIPHENHYDRAMINE HYDROCHLORIDE 25 MG: 50 INJECTION INTRAMUSCULAR; INTRAVENOUS at 16:39

## 2023-11-09 RX ADMIN — HYDROMORPHONE HYDROCHLORIDE 1 MG: 1 INJECTION, SOLUTION INTRAMUSCULAR; INTRAVENOUS; SUBCUTANEOUS at 04:03

## 2023-11-09 RX ADMIN — HYDROMORPHONE HYDROCHLORIDE 1 MG: 1 INJECTION, SOLUTION INTRAMUSCULAR; INTRAVENOUS; SUBCUTANEOUS at 08:29

## 2023-11-09 RX ADMIN — HYDROMORPHONE HYDROCHLORIDE 1 MG: 1 INJECTION, SOLUTION INTRAMUSCULAR; INTRAVENOUS; SUBCUTANEOUS at 20:25

## 2023-11-09 RX ADMIN — CEFEPIME 2000 MG: 2 INJECTION, POWDER, FOR SOLUTION INTRAVENOUS at 02:21

## 2023-11-09 RX ADMIN — PROMETHAZINE HYDROCHLORIDE 25 MG: 25 TABLET ORAL at 18:59

## 2023-11-09 RX ADMIN — CEFEPIME 2000 MG: 2 INJECTION, POWDER, FOR SOLUTION INTRAVENOUS at 08:34

## 2023-11-09 RX ADMIN — HYDROMORPHONE HYDROCHLORIDE 1 MG: 1 INJECTION, SOLUTION INTRAMUSCULAR; INTRAVENOUS; SUBCUTANEOUS at 16:29

## 2023-11-09 RX ADMIN — HYDROMORPHONE HYDROCHLORIDE 1 MG: 1 INJECTION, SOLUTION INTRAMUSCULAR; INTRAVENOUS; SUBCUTANEOUS at 13:26

## 2023-11-09 RX ADMIN — GABAPENTIN 400 MG: 400 CAPSULE ORAL at 20:26

## 2023-11-09 RX ADMIN — GABAPENTIN 400 MG: 400 CAPSULE ORAL at 08:29

## 2023-11-09 RX ADMIN — HYDROMORPHONE HYDROCHLORIDE 1 MG: 1 INJECTION, SOLUTION INTRAMUSCULAR; INTRAVENOUS; SUBCUTANEOUS at 00:07

## 2023-11-09 RX ADMIN — ENOXAPARIN SODIUM 40 MG: 100 INJECTION SUBCUTANEOUS at 08:29

## 2023-11-09 RX ADMIN — DIPHENHYDRAMINE HYDROCHLORIDE 25 MG: 50 INJECTION INTRAMUSCULAR; INTRAVENOUS at 06:21

## 2023-11-09 RX ADMIN — LAMOTRIGINE 100 MG: 100 TABLET ORAL at 08:29

## 2023-11-09 ASSESSMENT — PAIN DESCRIPTION - LOCATION
LOCATION: ABDOMEN

## 2023-11-09 ASSESSMENT — PAIN DESCRIPTION - ONSET
ONSET: ON-GOING

## 2023-11-09 ASSESSMENT — PAIN DESCRIPTION - FREQUENCY
FREQUENCY: CONTINUOUS

## 2023-11-09 ASSESSMENT — PAIN DESCRIPTION - ORIENTATION
ORIENTATION: RIGHT
ORIENTATION: RIGHT;MID
ORIENTATION: RIGHT
ORIENTATION: RIGHT;MID

## 2023-11-09 ASSESSMENT — PAIN DESCRIPTION - DESCRIPTORS
DESCRIPTORS: SHARP
DESCRIPTORS: ACHING
DESCRIPTORS: SHARP
DESCRIPTORS: ACHING

## 2023-11-09 ASSESSMENT — PAIN DESCRIPTION - PAIN TYPE
TYPE: ACUTE PAIN

## 2023-11-09 ASSESSMENT — PAIN - FUNCTIONAL ASSESSMENT
PAIN_FUNCTIONAL_ASSESSMENT: ACTIVITIES ARE NOT PREVENTED

## 2023-11-09 ASSESSMENT — PAIN SCALES - GENERAL
PAINLEVEL_OUTOF10: 10
PAINLEVEL_OUTOF10: 10
PAINLEVEL_OUTOF10: 9
PAINLEVEL_OUTOF10: 10
PAINLEVEL_OUTOF10: 8
PAINLEVEL_OUTOF10: 9

## 2023-11-09 NOTE — PROGRESS NOTES
Pt continues complaining of hives, itching and redness to left arm, chest and abdomen. Mild redness noted  on abd and left arm. Pt have been medicated with PRN pain analgesics and IV benadryl during this shift. Awaiting PICC nurse if the have hypo allergic dressing. PICC team notified with pt concern yesterday, and  plan to send someone for further assessment. Care plan ongoing.

## 2023-11-09 NOTE — PLAN OF CARE
Problem: Discharge Planning  Goal: Discharge to home or other facility with appropriate resources  Outcome: Progressing  Flowsheets (Taken 11/9/2023 0830)  Discharge to home or other facility with appropriate resources: Identify barriers to discharge with patient and caregiver     Problem: Safety - Adult  Goal: Free from fall injury  Outcome: Progressing     Problem: Infection - Adult  Goal: Absence of infection at discharge  Outcome: Progressing  Flowsheets (Taken 11/9/2023 0830)  Absence of infection at discharge: Assess and monitor for signs and symptoms of infection  Goal: Absence of infection during hospitalization  Outcome: Progressing  Flowsheets (Taken 11/9/2023 0830)  Absence of infection during hospitalization: Assess and monitor for signs and symptoms of infection  Goal: Absence of fever/infection during anticipated neutropenic period  Outcome: Progressing  Flowsheets (Taken 11/9/2023 0830)  Absence of fever/infection during anticipated neutropenic period: Monitor white blood cell count     Problem: Metabolic/Fluid and Electrolytes - Adult  Goal: Electrolytes maintained within normal limits  Outcome: Progressing  Flowsheets (Taken 11/9/2023 0830)  Electrolytes maintained within normal limits: Monitor labs and assess patient for signs and symptoms of electrolyte imbalances  Goal: Hemodynamic stability and optimal renal function maintained  Outcome: Progressing  Flowsheets (Taken 11/9/2023 0830)  Hemodynamic stability and optimal renal function maintained: Monitor labs and assess for signs and symptoms of volume excess or deficit     Problem: ABCDS Injury Assessment  Goal: Absence of physical injury  Outcome: Progressing

## 2023-11-09 NOTE — PROGRESS NOTES
Pt states that she is having increased anxiety and would like MD to order something. Pt has prn Atarax ordered but states that it only makes her anxiety worse.  Attending messaged

## 2023-11-09 NOTE — CARE COORDINATION
Argentina Stapleton  placed call to  SW, Would like for  to fax DC summary to [facility] when Pt receives a DC order. Yue's phone number is [phone]; Fax is [phone].      Electronically signed by ALICIA Larkin on 11/9/2023 at 4:26 PM

## 2023-11-10 ENCOUNTER — TELEPHONE (OUTPATIENT)
Dept: CARDIOLOGY CLINIC | Age: 43
End: 2023-11-10

## 2023-11-10 VITALS
TEMPERATURE: 98.2 F | BODY MASS INDEX: 34.27 KG/M2 | HEIGHT: 59 IN | RESPIRATION RATE: 18 BRPM | HEART RATE: 72 BPM | OXYGEN SATURATION: 93 % | DIASTOLIC BLOOD PRESSURE: 73 MMHG | SYSTOLIC BLOOD PRESSURE: 117 MMHG | WEIGHT: 169.97 LBS

## 2023-11-10 LAB
GLUCOSE BLD-MCNC: 140 MG/DL (ref 70–99)
GLUCOSE BLD-MCNC: 140 MG/DL (ref 70–99)
GLUCOSE BLD-MCNC: 155 MG/DL (ref 70–99)
PERFORMED ON: ABNORMAL

## 2023-11-10 PROCEDURE — 6370000000 HC RX 637 (ALT 250 FOR IP): Performed by: FAMILY MEDICINE

## 2023-11-10 PROCEDURE — 6360000002 HC RX W HCPCS: Performed by: FAMILY MEDICINE

## 2023-11-10 PROCEDURE — 6360000002 HC RX W HCPCS: Performed by: STUDENT IN AN ORGANIZED HEALTH CARE EDUCATION/TRAINING PROGRAM

## 2023-11-10 PROCEDURE — 2580000003 HC RX 258: Performed by: FAMILY MEDICINE

## 2023-11-10 PROCEDURE — 2580000003 HC RX 258: Performed by: STUDENT IN AN ORGANIZED HEALTH CARE EDUCATION/TRAINING PROGRAM

## 2023-11-10 PROCEDURE — 6370000000 HC RX 637 (ALT 250 FOR IP): Performed by: STUDENT IN AN ORGANIZED HEALTH CARE EDUCATION/TRAINING PROGRAM

## 2023-11-10 PROCEDURE — 6370000000 HC RX 637 (ALT 250 FOR IP): Performed by: INTERNAL MEDICINE

## 2023-11-10 RX ORDER — TRAMADOL HYDROCHLORIDE 50 MG/1
50 TABLET ORAL EVERY 4 HOURS PRN
Qty: 30 TABLET | Refills: 0 | Status: SHIPPED | OUTPATIENT
Start: 2023-11-10 | End: 2023-11-15

## 2023-11-10 RX ADMIN — ONDANSETRON HYDROCHLORIDE 4 MG: 4 TABLET, FILM COATED ORAL at 06:06

## 2023-11-10 RX ADMIN — SODIUM CHLORIDE, PRESERVATIVE FREE 10 ML: 5 INJECTION INTRAVENOUS at 10:41

## 2023-11-10 RX ADMIN — DIPHENHYDRAMINE HYDROCHLORIDE 25 MG: 50 INJECTION INTRAMUSCULAR; INTRAVENOUS at 00:36

## 2023-11-10 RX ADMIN — PROMETHAZINE HYDROCHLORIDE 25 MG: 25 TABLET ORAL at 00:37

## 2023-11-10 RX ADMIN — GABAPENTIN 400 MG: 400 CAPSULE ORAL at 09:11

## 2023-11-10 RX ADMIN — ENOXAPARIN SODIUM 40 MG: 100 INJECTION SUBCUTANEOUS at 09:12

## 2023-11-10 RX ADMIN — HYDROMORPHONE HYDROCHLORIDE 1 MG: 1 INJECTION, SOLUTION INTRAMUSCULAR; INTRAVENOUS; SUBCUTANEOUS at 06:05

## 2023-11-10 RX ADMIN — PROMETHAZINE HYDROCHLORIDE 25 MG: 25 TABLET ORAL at 09:11

## 2023-11-10 RX ADMIN — LAMOTRIGINE 100 MG: 100 TABLET ORAL at 09:11

## 2023-11-10 RX ADMIN — HYDROMORPHONE HYDROCHLORIDE 1 MG: 1 INJECTION, SOLUTION INTRAMUSCULAR; INTRAVENOUS; SUBCUTANEOUS at 00:36

## 2023-11-10 RX ADMIN — HYDROMORPHONE HYDROCHLORIDE 1 MG: 1 INJECTION, SOLUTION INTRAMUSCULAR; INTRAVENOUS; SUBCUTANEOUS at 10:38

## 2023-11-10 ASSESSMENT — PAIN DESCRIPTION - LOCATION
LOCATION: ABDOMEN
LOCATION: CHEST

## 2023-11-10 ASSESSMENT — PAIN DESCRIPTION - ORIENTATION
ORIENTATION: MID
ORIENTATION: RIGHT

## 2023-11-10 ASSESSMENT — PAIN DESCRIPTION - DESCRIPTORS
DESCRIPTORS: ACHING
DESCRIPTORS: ACHING

## 2023-11-10 ASSESSMENT — PAIN SCALES - GENERAL
PAINLEVEL_OUTOF10: 10
PAINLEVEL_OUTOF10: 9

## 2023-11-10 ASSESSMENT — PAIN - FUNCTIONAL ASSESSMENT: PAIN_FUNCTIONAL_ASSESSMENT: ACTIVITIES ARE NOT PREVENTED

## 2023-11-10 NOTE — PLAN OF CARE
Problem: Discharge Planning  Goal: Discharge to home or other facility with appropriate resources  11/9/2023 2316 by Agustin Sepulveda RN  Outcome: Progressing  11/9/2023 1451 by Irene Pierre RN  Outcome: Progressing  Flowsheets (Taken 11/9/2023 0830)  Discharge to home or other facility with appropriate resources: Identify barriers to discharge with patient and caregiver     Problem: Safety - Adult  Goal: Free from fall injury  11/9/2023 2316 by Agustin Sepulveda RN  Outcome: Progressing  11/9/2023 1451 by Irene Pierre RN  Outcome: Progressing     Problem: Pain  Goal: Verbalizes/displays adequate comfort level or baseline comfort level  11/9/2023 2316 by Agustin Sepulveda RN  Outcome: Progressing  11/9/2023 1451 by Irene Pierre RN  Outcome: Progressing     Problem: Infection - Adult  Goal: Absence of infection at discharge  11/9/2023 2316 by Agustin Sepulveda RN  Outcome: Progressing  11/9/2023 1451 by Irene Pierre RN  Outcome: Progressing  Flowsheets (Taken 11/9/2023 0830)  Absence of infection at discharge: Assess and monitor for signs and symptoms of infection  Goal: Absence of infection during hospitalization  11/9/2023 2316 by Agustin Sepulveda RN  Outcome: Progressing  11/9/2023 1451 by Irene Pierre RN  Outcome: Progressing  Flowsheets (Taken 11/9/2023 0830)  Absence of infection during hospitalization: Assess and monitor for signs and symptoms of infection  Goal: Absence of fever/infection during anticipated neutropenic period  11/9/2023 2316 by Agustin Sepulveda RN  Outcome: Progressing  11/9/2023 1451 by Irene Pierre RN  Outcome: Progressing  Flowsheets (Taken 11/9/2023 0830)  Absence of fever/infection during anticipated neutropenic period: Monitor white blood cell count     Problem: Metabolic/Fluid and Electrolytes - Adult  Goal: Electrolytes maintained within normal limits  11/9/2023 2316 by Agustin Sepulveda RN  Outcome: Progressing  11/9/2023 1451 by Irene Pierre RN  Outcome: Progressing  Flowsheets (Taken 11/9/2023 0830)  Electrolytes maintained within normal limits: Monitor labs and assess patient for signs and symptoms of electrolyte imbalances  Goal: Hemodynamic stability and optimal renal function maintained  11/9/2023 2316 by Niko Ponce RN  Outcome: Progressing  11/9/2023 1451 by Randolph Ken RN  Outcome: Progressing  Flowsheets (Taken 11/9/2023 0830)  Hemodynamic stability and optimal renal function maintained: Monitor labs and assess for signs and symptoms of volume excess or deficit     Problem: ABCDS Injury Assessment  Goal: Absence of physical injury  11/9/2023 2316 by Niko Ponce RN  Outcome: Progressing  11/9/2023 1451 by Randolph Ken RN  Outcome: Progressing

## 2023-11-10 NOTE — PLAN OF CARE
Problem: Discharge Planning  Goal: Discharge to home or other facility with appropriate resources  11/10/2023 1303 by Allie Beltran RN  Outcome: Adequate for Discharge  Flowsheets (Taken 11/10/2023 1100)  Discharge to home or other facility with appropriate resources: Identify barriers to discharge with patient and caregiver  11/9/2023 2316 by Stephanie Linares RN  Outcome: Progressing     Problem: Safety - Adult  Goal: Free from fall injury  11/10/2023 1303 by Allie Beltran RN  Outcome: Adequate for Discharge  11/9/2023 2316 by Stephanie Linares RN  Outcome: Progressing     Problem: Pain  Goal: Verbalizes/displays adequate comfort level or baseline comfort level  11/10/2023 1303 by Allie Beltran RN  Outcome: Adequate for Discharge  11/9/2023 2316 by Stephanie Linares RN  Outcome: Progressing     Problem: Infection - Adult  Goal: Absence of infection at discharge  11/10/2023 1303 by Allie Beltran RN  Outcome: Adequate for Discharge  Flowsheets (Taken 11/10/2023 1100)  Absence of infection at discharge: Assess and monitor for signs and symptoms of infection  11/9/2023 2316 by Stephanie Linares RN  Outcome: Progressing  Goal: Absence of infection during hospitalization  11/10/2023 1303 by Allie Beltran RN  Outcome: Adequate for Discharge  Flowsheets (Taken 11/10/2023 1100)  Absence of infection during hospitalization: Assess and monitor for signs and symptoms of infection  11/9/2023 2316 by Stephanie Linares RN  Outcome: Progressing  Goal: Absence of fever/infection during anticipated neutropenic period  11/10/2023 1303 by Allie Beltran RN  Outcome: Adequate for Discharge  Flowsheets (Taken 11/10/2023 1100)  Absence of fever/infection during anticipated neutropenic period: Monitor white blood cell count  11/9/2023 2316 by Stephanie Linares RN  Outcome: Progressing     Problem: Metabolic/Fluid and Electrolytes - Adult  Goal: Electrolytes maintained within normal limits  11/10/2023 1303 by Allie Beltran RN  Outcome: Adequate for Discharge  Flowsheets (Taken 11/10/2023 1100)  Electrolytes maintained within normal limits: Monitor labs and assess patient for signs and symptoms of electrolyte imbalances  11/9/2023 2316 by Siomara Mello RN  Outcome: Progressing  Goal: Hemodynamic stability and optimal renal function maintained  11/10/2023 1303 by Emily Mackenzie RN  Outcome: Adequate for Discharge  Flowsheets (Taken 11/10/2023 1100)  Hemodynamic stability and optimal renal function maintained: Monitor labs and assess for signs and symptoms of volume excess or deficit  11/9/2023 2316 by Siomara Mello RN  Outcome: Progressing     Problem: ABCDS Injury Assessment  Goal: Absence of physical injury  11/10/2023 1303 by Emily Mackenzie RN  Outcome: Adequate for Discharge  11/9/2023 2316 by Siomara Mello RN  Outcome: Progressing

## 2023-11-10 NOTE — CARE COORDINATION
Case Management Discharge Note          Date / Time of Note: 11/10/2023 11:45 AM                  Patient Name: Julio Cesar Pruitt   YOB: 1980  Diagnosis: Tachycardia [R00.0]  Generalized abdominal pain [R10.84]  SIRS (systemic inflammatory response syndrome) ([address]) [R65.10]  Severe sepsis ([address]) [A41.9, R65.20]  Leukocytosis, unspecified type [D72.829]   Date / Time: 11/3/2023  1:59 PM    Financial:  Payor: [employer] / Plan: Michael Davis / Product Type: *No Product type* /      Pharmacy:    [facility] [id number] - [address] , [facility] [address]  Phone: [phone] Fax: [phone]      Assistance purchasing medications?: Potential Assistance Purchasing Medications: No  Assistance provided by Case Management: None at this time    DISCHARGE Disposition: Home- No Services Needed    Transportation:  Transportation PLAN for discharge: friend   Mode of Transport: Private Car        Additional CM Notes: Pt returning home with her mother. Pt friend to pick her up and transport her to mother's home at discharge. Pt DC or oral antibiotics. Dr. Lynnette Castro no further need for a home care nurse due to no IV. No further needs at this time. Discharge order faxed to [facility] [address], [address] [facility].      The Plan for Transition of Care is related to the following treatment goals of Tachycardia [R00.0]  Generalized abdominal pain [R10.84]  SIRS (systemic inflammatory response syndrome) ([facility]) [R65.10]  Severe sepsis ([address]) [A41.9, R65.20]  Leukocytosis, unspecified type [D72.829]    Anh Youngblood[address]   Case Management Department  Ph: [phone]

## 2023-11-10 NOTE — PROGRESS NOTES
V2.0  [facility] Hospitalist Progress Note      Name:  Key Urbina /Age/Sex: 1980  (41 y.o. female)   MRN & CSN:  [id number] & [id number] Encounter Date/Time: 2023 8:28 AM EDT    Location:  [id number]/1156-15 PCP: Markus Brown Day: 7    Assessment and Plan:   Key Urbina is a 41 y.o. female with congenital hydrocephalus s/p  shunt presenting with SIRS of unclear etiology    Plan:  SIRS - Resolved  -On presentation: Fever to 102.3 at home. Tachycardia to 130s. Leukocytosis to 18.9 with neutrophil predominance. Now WBC normalized  -Concern for infection, however only localizing factor with patient is her right-sided abdominal pain. -ID consulted, appreciate any input  -Continue Cefepime  Right-sided abdominal pain  -On exam tenderness wraps along right thigh down to the lower abdomen and moves midline. Notably this corresponds with her  shunt on CT imaging.  -Additional abdominal fullness today. We will obtain repeat imaging  -General surgery consulted; feel this is not likely related to the shunt. No apparent surgical intervention  -From chart review, patient frequently is in the ED with abdominal pain. Discussed with patient that if this is related to her shunt, and may end up being more of a long-term issue. Felt that she may benefit from establishing with pain management at discharge. Patient apprehensive stating that she does not want to become addicted. Tachycardia - improved  -Has noticed tachycardia on her Apple Watch intermittently over a few weeks  -Being worked up by Dr. Ashly Burnham cardiology. Holter monitor ordered yesterday but has not received yet.   -TSH D-dimer negative  -EKG sinus tachycardia, troponin negative  -Heart rate currently improved; suspect this is related to treatment of SIRS  Syncope  -Patient reported several syncopal episodes at home  -CT head without-acute abnormality  -Echo ordered  Congenital hydrocephalus s/p  shunt (, replaced left hemithorax and crossing over to the right upper abdomen. Mediastinum/Heart: Rightward scoliosis of the thoracic spine. The heart appears normal in size. Lungs: Bibasilar parenchymal opacities are present, likely atelectasis. Pleura: No pleural effusion. No pneumothorax. No radiographic evidence of acute cardiopulmonary pathology.        Electronically signed by Hoa Umaña MD on 11/9/2023 at 8:00 PM

## 2023-11-10 NOTE — PROGRESS NOTES
Comprehensive Nutrition Assessment    Type and Reason for Visit:  Initial, RD Nutrition Re-Screen/LOS    Nutrition Recommendations/Plan:   Continue regular diet     Malnutrition Assessment:  Malnutrition Status:  No malnutrition (11/10/23 1441)    Context:  Acute Illness         Nutrition Assessment:    Pt assessed for LOS. PMH includes; IBS, HLD, pre-DM, Hydrocephalus. Pt adm with SIRS and abd pain. MD addressing. Diet adv to [address] (4). Pt reported tolerating diet ordered with occasional episodes of nausea. Pt reported that altered GI probably r/t shunt. Declined need for ONS. Noted plan for discharge this date. Pt with low nutrition risk at this time. Nutrition Related Findings:    Labs reviewed. Noted BM on 11/6 with no complaints of constipation. Noted no edema. Wound Type:  (Blister to RUE and rash to right arm / chest)       Current Nutrition Intake & Therapies:    Average Meal Intake: %, 51-75%     ADULT DIET; Regular; 4 carb choices (60 gm/meal)    Anthropometric Measures:  Height: 149.9 cm (4' 11.02\")  Ideal Body Weight (IBW): 95 lbs (43 kg)    Admission Body Weight: 75.3 kg (166 lb)  Current Body Weight: 77.1 kg (170 lb),   IBW. Weight Source: Bed Scale  Current BMI (kg/m2): 34.3                          BMI Categories: Obese Class 1 (BMI 30.0-34. 9)        Nutrition Diagnosis:   No nutrition diagnosis at this time     Nutrition Interventions:   Food and/or Nutrient Delivery: Continue Current Diet  Nutrition Education/Counseling: Education not indicated  Coordination of Nutrition Care: Continue to monitor while inpatient       Goals:     Goals: PO intake 50% or greater       Nutrition Monitoring and Evaluation:   Behavioral-Environmental Outcomes: None Identified  Food/Nutrient Intake Outcomes: Food and Nutrient Intake  Physical Signs/Symptoms Outcomes: Biochemical Data, Constipation, Diarrhea, Nausea or Vomiting, Weight, Skin, Fluid Status or Edema    Discharge Planning:    Continue current diet

## 2023-11-10 NOTE — PROGRESS NOTES
This RN discussed discharge instructions with pt. Pt educated on the importance of follow up appointment. Pt instructed to contact [address] [place] [address] for outpatient appointment regarding Holter Monitor per Deb Yarbrough. Pt is agreeable. All questions answered. PICC removed. Petroleum dressing and gauze placed on incision site and pt instructed to lie flat per hospital policy. Pt tolerated well. Pt ambulated to discharge with all of her belongings and paper prescription for Ultram.

## 2023-11-10 NOTE — TELEPHONE ENCOUNTER
Patient being discharged from [facility]. Nurse called in regards to 48 hour monitor patient said she needed. Cardiology not consulted on patient during admission. Dr. Mitra Garduno saw patient at [facility] 11/1, note states if labs WNL will order monitor. In Lab results, [address] [place] [address] RN attempted to call patient and LM. I instructed nurse to have the patient call [facility] about the monitor. She has had hospital visits after OV. Message sent to Dr. Mitra Garduno to confirm need for monitor. Dr. Mitra Garduno states NO need for monitor.

## 2023-11-10 NOTE — PROGRESS NOTES
Pt C/o nausea. Pt medicated with prn antiemetic. See MAR. Call light and bedside table within reach.

## 2023-11-12 ENCOUNTER — TELEPHONE (OUTPATIENT)
Dept: INTERNAL MEDICINE CLINIC | Age: 43
End: 2023-11-12

## 2023-11-14 ENCOUNTER — TELEPHONE (OUTPATIENT)
Dept: INTERNAL MEDICINE CLINIC | Age: 43
End: 2023-11-14

## 2023-11-14 ENCOUNTER — HOSPITAL ENCOUNTER (EMERGENCY)
Age: 43
Discharge: HOME | End: 2023-11-14
Payer: COMMERCIAL

## 2023-11-14 ENCOUNTER — APPOINTMENT (OUTPATIENT)
Dept: CT IMAGING | Age: 43
End: 2023-11-14
Payer: COMMERCIAL

## 2023-11-14 VITALS
TEMPERATURE: 98.7 F | OXYGEN SATURATION: 94 % | BODY MASS INDEX: 35.02 KG/M2 | SYSTOLIC BLOOD PRESSURE: 112 MMHG | HEIGHT: 59 IN | WEIGHT: 173.72 LBS | RESPIRATION RATE: 19 BRPM | HEART RATE: 85 BPM | DIASTOLIC BLOOD PRESSURE: 77 MMHG

## 2023-11-14 DIAGNOSIS — R10.9 ABDOMINAL PAIN, UNSPECIFIED ABDOMINAL LOCATION: ICD-10-CM

## 2023-11-14 DIAGNOSIS — K59.00 CONSTIPATION, UNSPECIFIED CONSTIPATION TYPE: ICD-10-CM

## 2023-11-14 DIAGNOSIS — K64.9 HEMORRHOIDS, UNSPECIFIED HEMORRHOID TYPE: ICD-10-CM

## 2023-11-14 DIAGNOSIS — K62.5 RECTAL BLEEDING: Primary | ICD-10-CM

## 2023-11-14 LAB
ALBUMIN SERPL-MCNC: 4.5 G/DL (ref 3.4–5)
ALBUMIN/GLOB SERPL: 1.4 {RATIO} (ref 1.1–2.2)
ALP SERPL-CCNC: 225 U/L (ref 40–129)
ALT SERPL-CCNC: 73 U/L (ref 10–40)
ANION GAP SERPL CALCULATED.3IONS-SCNC: 12 MMOL/L (ref 3–16)
AST SERPL-CCNC: 33 U/L (ref 15–37)
BASOPHILS # BLD: 0.1 K/UL (ref 0–0.2)
BASOPHILS NFR BLD: 0.7 %
BILIRUB SERPL-MCNC: 0.6 MG/DL (ref 0–1)
BILIRUB UR QL STRIP.AUTO: NEGATIVE
BUN SERPL-MCNC: 10 MG/DL (ref 7–20)
CALCIUM SERPL-MCNC: 10 MG/DL (ref 8.3–10.6)
CHLORIDE SERPL-SCNC: 100 MMOL/L (ref 99–110)
CLARITY UR: CLEAR
CO2 SERPL-SCNC: 26 MMOL/L (ref 21–32)
COLOR UR: YELLOW
CREAT SERPL-MCNC: 0.6 MG/DL (ref 0.6–1.1)
DEPRECATED RDW RBC AUTO: 14.8 % (ref 12.4–15.4)
EOSINOPHIL # BLD: 0.1 K/UL (ref 0–0.6)
EOSINOPHIL NFR BLD: 1 %
GFR SERPLBLD CREATININE-BSD FMLA CKD-EPI: >60 ML/MIN/{1.73_M2}
GLUCOSE SERPL-MCNC: 154 MG/DL (ref 70–99)
GLUCOSE UR STRIP.AUTO-MCNC: NEGATIVE MG/DL
HCG SERPL QL: NEGATIVE
HCT VFR BLD AUTO: 48.4 % (ref 36–48)
HEMOCCULT STL QL: ABNORMAL
HGB BLD-MCNC: 16.4 G/DL (ref 12–16)
HGB UR QL STRIP.AUTO: NEGATIVE
KETONES UR STRIP.AUTO-MCNC: NEGATIVE MG/DL
LEUKOCYTE ESTERASE UR QL STRIP.AUTO: NEGATIVE
LIPASE SERPL-CCNC: 13 U/L (ref 13–60)
LYMPHOCYTES # BLD: 1.5 K/UL (ref 1–5.1)
LYMPHOCYTES NFR BLD: 16.9 %
MCH RBC QN AUTO: 29.4 PG (ref 26–34)
MCHC RBC AUTO-ENTMCNC: 33.9 G/DL (ref 31–36)
MCV RBC AUTO: 86.7 FL (ref 80–100)
MONOCYTES # BLD: 0.4 K/UL (ref 0–1.3)
MONOCYTES NFR BLD: 4.5 %
NEUTROPHILS # BLD: 6.6 K/UL (ref 1.7–7.7)
NEUTROPHILS NFR BLD: 76.9 %
NITRITE UR QL STRIP.AUTO: NEGATIVE
PH UR STRIP.AUTO: 6.5 [PH] (ref 5–8)
PLATELET # BLD AUTO: 225 K/UL (ref 135–450)
PMV BLD AUTO: 7.9 FL (ref 5–10.5)
POTASSIUM SERPL-SCNC: 4.1 MMOL/L (ref 3.5–5.1)
PROT SERPL-MCNC: 7.8 G/DL (ref 6.4–8.2)
PROT UR STRIP.AUTO-MCNC: NEGATIVE MG/DL
RBC # BLD AUTO: 5.58 M/UL (ref 4–5.2)
SODIUM SERPL-SCNC: 138 MMOL/L (ref 136–145)
SP GR UR STRIP.AUTO: 1.01 (ref 1–1.03)
UA COMPLETE W REFLEX CULTURE PNL UR: NORMAL
UA DIPSTICK W REFLEX MICRO PNL UR: NORMAL
URN SPEC COLLECT METH UR: NORMAL
UROBILINOGEN UR STRIP-ACNC: 0.2 E.U./DL
WBC # BLD AUTO: 8.6 K/UL (ref 4–11)

## 2023-11-14 PROCEDURE — 82270 OCCULT BLOOD FECES: CPT

## 2023-11-14 PROCEDURE — 96365 THER/PROPH/DIAG IV INF INIT: CPT

## 2023-11-14 PROCEDURE — 81003 URINALYSIS AUTO W/O SCOPE: CPT

## 2023-11-14 PROCEDURE — 99285 EMERGENCY DEPT VISIT HI MDM: CPT

## 2023-11-14 PROCEDURE — 96375 TX/PRO/DX INJ NEW DRUG ADDON: CPT

## 2023-11-14 PROCEDURE — 80053 COMPREHEN METABOLIC PANEL: CPT

## 2023-11-14 PROCEDURE — 84703 CHORIONIC GONADOTROPIN ASSAY: CPT

## 2023-11-14 PROCEDURE — 36415 COLL VENOUS BLD VENIPUNCTURE: CPT

## 2023-11-14 PROCEDURE — 6360000002 HC RX W HCPCS: Performed by: PHYSICIAN ASSISTANT

## 2023-11-14 PROCEDURE — 85025 COMPLETE CBC W/AUTO DIFF WBC: CPT

## 2023-11-14 PROCEDURE — 83690 ASSAY OF LIPASE: CPT

## 2023-11-14 PROCEDURE — 6360000004 HC RX CONTRAST MEDICATION: Performed by: PHYSICIAN ASSISTANT

## 2023-11-14 PROCEDURE — 74174 CTA ABD&PLVS W/CONTRAST: CPT

## 2023-11-14 RX ORDER — TRAMADOL HYDROCHLORIDE 50 MG/1
50 TABLET ORAL EVERY 6 HOURS PRN
Qty: 6 TABLET | Refills: 0 | Status: SHIPPED | OUTPATIENT
Start: 2023-11-14 | End: 2023-11-17

## 2023-11-14 RX ORDER — MORPHINE SULFATE 4 MG/ML
4 INJECTION, SOLUTION INTRAMUSCULAR; INTRAVENOUS ONCE
Status: COMPLETED | OUTPATIENT
Start: 2023-11-14 | End: 2023-11-14

## 2023-11-14 RX ORDER — ONDANSETRON 4 MG/1
4 TABLET, ORALLY DISINTEGRATING ORAL 3 TIMES DAILY PRN
Qty: 12 TABLET | Refills: 0 | Status: SHIPPED | OUTPATIENT
Start: 2023-11-14 | End: 2023-11-16 | Stop reason: ALTCHOICE

## 2023-11-14 RX ORDER — ONDANSETRON 2 MG/ML
4 INJECTION INTRAMUSCULAR; INTRAVENOUS ONCE
Status: COMPLETED | OUTPATIENT
Start: 2023-11-14 | End: 2023-11-14

## 2023-11-14 RX ADMIN — Medication 12.5 MG: at 14:23

## 2023-11-14 RX ADMIN — IOPAMIDOL 75 ML: 755 INJECTION, SOLUTION INTRAVENOUS at 13:13

## 2023-11-14 RX ADMIN — MORPHINE SULFATE 4 MG: 4 INJECTION, SOLUTION INTRAMUSCULAR; INTRAVENOUS at 12:37

## 2023-11-14 RX ADMIN — ONDANSETRON 4 MG: 2 INJECTION INTRAMUSCULAR; INTRAVENOUS at 12:41

## 2023-11-14 ASSESSMENT — PAIN - FUNCTIONAL ASSESSMENT
PAIN_FUNCTIONAL_ASSESSMENT: 0-10
PAIN_FUNCTIONAL_ASSESSMENT: NONE - DENIES PAIN

## 2023-11-14 ASSESSMENT — ENCOUNTER SYMPTOMS
VOMITING: 0
ANAL BLEEDING: 1
SHORTNESS OF BREATH: 0
ABDOMINAL PAIN: 1
NAUSEA: 0
COUGH: 0
SORE THROAT: 0
EYE PAIN: 0
BACK PAIN: 0

## 2023-11-14 ASSESSMENT — PAIN DESCRIPTION - PAIN TYPE: TYPE: CHRONIC PAIN

## 2023-11-14 ASSESSMENT — PAIN DESCRIPTION - LOCATION
LOCATION: ABDOMEN
LOCATION: ABDOMEN;RECTUM

## 2023-11-14 ASSESSMENT — PAIN SCALES - GENERAL
PAINLEVEL_OUTOF10: 8
PAINLEVEL_OUTOF10: 8

## 2023-11-14 ASSESSMENT — PAIN DESCRIPTION - ORIENTATION: ORIENTATION: RIGHT;LEFT;UPPER;LOWER

## 2023-11-14 ASSESSMENT — PAIN DESCRIPTION - DESCRIPTORS: DESCRIPTORS: SHARP

## 2023-11-14 NOTE — DISCHARGE INSTRUCTIONS
Take prescribed medication as prescribed only  Follow with Dr. Starr Farrell gastroenterologist  Make sure you are taking fiber daily and MiraLAX. Use as instructed on the package.

## 2023-11-14 NOTE — ED TRIAGE NOTES
X3 days. EMS gave oral Zofran in route. EMS reported seeing blood clots in toilet. Just released from hospital Friday for Sepsis per patient.

## 2023-11-14 NOTE — TELEPHONE ENCOUNTER
Pt called, she is going to the ER, she is calling the ambulance.  Excessive amounts of blood in stool and clots this morning    Thank you

## 2023-11-14 NOTE — ED NOTES
Rectal occult completed by Shannan ROA. Pt tolerated well.    Assisted to RR and back and completed UA     Bridgett Zamarripa RN  11/14/23 [id number]

## 2023-11-14 NOTE — ED NOTES
BALTA ordered for patient, states that is how she got home last time     Carolyn Ha, SHANA  11/14/23 3162

## 2023-11-14 NOTE — ED PROVIDER NOTES
MG TABLET    Take 1 tablet by mouth every 6 hours as needed for Pain for up to 3 days. Intended supply: 3 days.  Take lowest dose possible to manage pain Max Daily Amount: 200 mg       DISCONTINUED MEDICATIONS:  Discontinued Medications    No medications on file              (Please note the MDM and HPI sections of this note were completed with a voice recognition program.  Efforts were made to edit the dictations but occasionally words are mis-transcribed.)    Electronically signed, Shari Parada PA-C,          Shari Parada PA-C  11/14/23 7581

## 2023-11-16 ENCOUNTER — OFFICE VISIT (OUTPATIENT)
Dept: INTERNAL MEDICINE CLINIC | Age: 43
End: 2023-11-16
Payer: COMMERCIAL

## 2023-11-16 VITALS
TEMPERATURE: 98.5 F | SYSTOLIC BLOOD PRESSURE: 136 MMHG | WEIGHT: 167.38 LBS | DIASTOLIC BLOOD PRESSURE: 88 MMHG | OXYGEN SATURATION: 98 % | HEART RATE: 94 BPM | BODY MASS INDEX: 33.81 KG/M2

## 2023-11-16 DIAGNOSIS — R11.2 NAUSEA AND VOMITING, UNSPECIFIED VOMITING TYPE: ICD-10-CM

## 2023-11-16 DIAGNOSIS — M54.16 LUMBAR BACK PAIN WITH RADICULOPATHY AFFECTING LEFT LOWER EXTREMITY: ICD-10-CM

## 2023-11-16 DIAGNOSIS — K62.5 RECTAL BLEEDING: ICD-10-CM

## 2023-11-16 DIAGNOSIS — Q03.9 CONGENITAL HYDROCEPHALUS (HCC): ICD-10-CM

## 2023-11-16 DIAGNOSIS — R10.11 RUQ PAIN: Primary | ICD-10-CM

## 2023-11-16 PROCEDURE — 1111F DSCHRG MED/CURRENT MED MERGE: CPT | Performed by: NURSE PRACTITIONER

## 2023-11-16 PROCEDURE — G8484 FLU IMMUNIZE NO ADMIN: HCPCS | Performed by: NURSE PRACTITIONER

## 2023-11-16 PROCEDURE — 4004F PT TOBACCO SCREEN RCVD TLK: CPT | Performed by: NURSE PRACTITIONER

## 2023-11-16 PROCEDURE — 3078F DIAST BP <80 MM HG: CPT | Performed by: NURSE PRACTITIONER

## 2023-11-16 PROCEDURE — G8427 DOCREV CUR MEDS BY ELIG CLIN: HCPCS | Performed by: NURSE PRACTITIONER

## 2023-11-16 PROCEDURE — 3074F SYST BP LT 130 MM HG: CPT | Performed by: NURSE PRACTITIONER

## 2023-11-16 PROCEDURE — G8417 CALC BMI ABV UP PARAM F/U: HCPCS | Performed by: NURSE PRACTITIONER

## 2023-11-16 PROCEDURE — 99214 OFFICE O/P EST MOD 30 MIN: CPT | Performed by: NURSE PRACTITIONER

## 2023-11-16 RX ORDER — GABAPENTIN 400 MG/1
400 CAPSULE ORAL 3 TIMES DAILY
Qty: 90 CAPSULE | Refills: 0 | Status: SHIPPED | OUTPATIENT
Start: 2023-11-16 | End: 2024-05-14

## 2023-11-16 RX ORDER — PROMETHAZINE HYDROCHLORIDE 25 MG/1
25 TABLET ORAL 4 TIMES DAILY PRN
Qty: 20 TABLET | Refills: 0 | Status: SHIPPED | OUTPATIENT
Start: 2023-11-16 | End: 2023-11-23

## 2023-11-17 ASSESSMENT — ENCOUNTER SYMPTOMS
SHORTNESS OF BREATH: 0
NAUSEA: 1
ABDOMINAL PAIN: 1
VOMITING: 1

## 2023-11-20 ENCOUNTER — HOSPITAL ENCOUNTER (EMERGENCY)
Age: 43
Discharge: HOME | End: 2023-11-20
Attending: EMERGENCY MEDICINE
Payer: COMMERCIAL

## 2023-11-20 ENCOUNTER — HOSPITAL ENCOUNTER (OUTPATIENT)
Age: 43
Discharge: HOME | End: 2023-11-20
Payer: COMMERCIAL

## 2023-11-20 ENCOUNTER — HOSPITAL ENCOUNTER (OUTPATIENT)
Dept: GENERAL RADIOLOGY | Age: 43
Discharge: HOME | End: 2023-11-20
Attending: INTERNAL MEDICINE
Payer: COMMERCIAL

## 2023-11-20 VITALS
HEART RATE: 89 BPM | OXYGEN SATURATION: 99 % | TEMPERATURE: 98.2 F | RESPIRATION RATE: 16 BRPM | HEIGHT: 59 IN | SYSTOLIC BLOOD PRESSURE: 129 MMHG | DIASTOLIC BLOOD PRESSURE: 79 MMHG | BODY MASS INDEX: 34.18 KG/M2 | WEIGHT: 169.53 LBS

## 2023-11-20 DIAGNOSIS — R10.11 ABDOMINAL PAIN, RIGHT UPPER QUADRANT: Primary | ICD-10-CM

## 2023-11-20 DIAGNOSIS — R11.2 NAUSEA AND VOMITING, UNSPECIFIED VOMITING TYPE: ICD-10-CM

## 2023-11-20 LAB
ALBUMIN SERPL-MCNC: 4.6 G/DL (ref 3.4–5)
ALP SERPL-CCNC: 142 U/L (ref 40–129)
ALT SERPL-CCNC: 18 U/L (ref 10–40)
ANION GAP SERPL CALCULATED.3IONS-SCNC: 13 MMOL/L (ref 3–16)
AST SERPL-CCNC: 14 U/L (ref 15–37)
BASOPHILS # BLD: 0.1 K/UL (ref 0–0.2)
BASOPHILS NFR BLD: 0.8 %
BILIRUB DIRECT SERPL-MCNC: <0.2 MG/DL (ref 0–0.3)
BILIRUB INDIRECT SERPL-MCNC: ABNORMAL MG/DL (ref 0–1)
BILIRUB SERPL-MCNC: 0.3 MG/DL (ref 0–1)
BILIRUB UR QL STRIP.AUTO: NEGATIVE
BUN SERPL-MCNC: 10 MG/DL (ref 7–20)
CALCIUM SERPL-MCNC: 9.3 MG/DL (ref 8.3–10.6)
CHLORIDE SERPL-SCNC: 102 MMOL/L (ref 99–110)
CLARITY UR: CLEAR
CO2 SERPL-SCNC: 25 MMOL/L (ref 21–32)
COLOR UR: YELLOW
CREAT SERPL-MCNC: 0.6 MG/DL (ref 0.6–1.1)
DEPRECATED RDW RBC AUTO: 14.6 % (ref 12.4–15.4)
EOSINOPHIL # BLD: 0.2 K/UL (ref 0–0.6)
EOSINOPHIL NFR BLD: 1.6 %
GFR SERPLBLD CREATININE-BSD FMLA CKD-EPI: >60 ML/MIN/{1.73_M2}
GLUCOSE SERPL-MCNC: 97 MG/DL (ref 70–99)
GLUCOSE UR STRIP.AUTO-MCNC: NEGATIVE MG/DL
HCT VFR BLD AUTO: 45.6 % (ref 36–48)
HGB BLD-MCNC: 15.5 G/DL (ref 12–16)
HGB UR QL STRIP.AUTO: NEGATIVE
KETONES UR STRIP.AUTO-MCNC: NEGATIVE MG/DL
LACTATE BLDV-SCNC: 1.3 MMOL/L (ref 0.4–2)
LEUKOCYTE ESTERASE UR QL STRIP.AUTO: NEGATIVE
LYMPHOCYTES # BLD: 2.6 K/UL (ref 1–5.1)
LYMPHOCYTES NFR BLD: 20.9 %
MCH RBC QN AUTO: 29.3 PG (ref 26–34)
MCHC RBC AUTO-ENTMCNC: 34.1 G/DL (ref 31–36)
MCV RBC AUTO: 86.2 FL (ref 80–100)
MONOCYTES # BLD: 0.7 K/UL (ref 0–1.3)
MONOCYTES NFR BLD: 5.5 %
NEUTROPHILS # BLD: 8.7 K/UL (ref 1.7–7.7)
NEUTROPHILS NFR BLD: 71.2 %
NITRITE UR QL STRIP.AUTO: NEGATIVE
PH UR STRIP.AUTO: 6 [PH] (ref 5–8)
PLATELET # BLD AUTO: 350 K/UL (ref 135–450)
PMV BLD AUTO: 8.2 FL (ref 5–10.5)
POTASSIUM SERPL-SCNC: 4 MMOL/L (ref 3.5–5.1)
PROT SERPL-MCNC: 7.6 G/DL (ref 6.4–8.2)
PROT UR STRIP.AUTO-MCNC: NEGATIVE MG/DL
RBC # BLD AUTO: 5.29 M/UL (ref 4–5.2)
SODIUM SERPL-SCNC: 140 MMOL/L (ref 136–145)
SP GR UR STRIP.AUTO: 1.01 (ref 1–1.03)
UA COMPLETE W REFLEX CULTURE PNL UR: NORMAL
UA DIPSTICK W REFLEX MICRO PNL UR: NORMAL
URN SPEC COLLECT METH UR: NORMAL
UROBILINOGEN UR STRIP-ACNC: 0.2 E.U./DL
WBC # BLD AUTO: 12.3 K/UL (ref 4–11)

## 2023-11-20 PROCEDURE — 6370000000 HC RX 637 (ALT 250 FOR IP): Performed by: EMERGENCY MEDICINE

## 2023-11-20 PROCEDURE — 83605 ASSAY OF LACTIC ACID: CPT

## 2023-11-20 PROCEDURE — 80076 HEPATIC FUNCTION PANEL: CPT

## 2023-11-20 PROCEDURE — 74018 RADEX ABDOMEN 1 VIEW: CPT

## 2023-11-20 PROCEDURE — 81003 URINALYSIS AUTO W/O SCOPE: CPT

## 2023-11-20 PROCEDURE — 85025 COMPLETE CBC W/AUTO DIFF WBC: CPT

## 2023-11-20 PROCEDURE — 80048 BASIC METABOLIC PNL TOTAL CA: CPT

## 2023-11-20 PROCEDURE — 99283 EMERGENCY DEPT VISIT LOW MDM: CPT

## 2023-11-20 PROCEDURE — 99284 EMERGENCY DEPT VISIT MOD MDM: CPT

## 2023-11-20 RX ORDER — PROMETHAZINE HYDROCHLORIDE 25 MG/1
25 TABLET ORAL 4 TIMES DAILY PRN
Qty: 20 TABLET | Refills: 0 | Status: SHIPPED | OUTPATIENT
Start: 2023-11-20 | End: 2023-11-27

## 2023-11-20 RX ORDER — ONDANSETRON 4 MG/1
4 TABLET, ORALLY DISINTEGRATING ORAL ONCE
Status: COMPLETED | OUTPATIENT
Start: 2023-11-20 | End: 2023-11-20

## 2023-11-20 RX ORDER — OXYCODONE HYDROCHLORIDE AND ACETAMINOPHEN 5; 325 MG/1; MG/1
1 TABLET ORAL ONCE
Status: DISCONTINUED | OUTPATIENT
Start: 2023-11-20 | End: 2023-11-20

## 2023-11-20 RX ORDER — OXYCODONE HYDROCHLORIDE 5 MG/1
5 TABLET ORAL ONCE
Status: COMPLETED | OUTPATIENT
Start: 2023-11-20 | End: 2023-11-20

## 2023-11-20 RX ADMIN — ONDANSETRON 4 MG: 4 TABLET, ORALLY DISINTEGRATING ORAL at 18:54

## 2023-11-20 RX ADMIN — OXYCODONE HYDROCHLORIDE 5 MG: 5 TABLET ORAL at 18:54

## 2023-11-20 ASSESSMENT — PAIN - FUNCTIONAL ASSESSMENT: PAIN_FUNCTIONAL_ASSESSMENT: 0-10

## 2023-11-20 ASSESSMENT — PAIN SCALES - GENERAL
PAINLEVEL_OUTOF10: 6
PAINLEVEL_OUTOF10: 9
PAINLEVEL_OUTOF10: 9

## 2023-11-20 ASSESSMENT — PAIN DESCRIPTION - FREQUENCY: FREQUENCY: CONTINUOUS

## 2023-11-20 ASSESSMENT — PAIN DESCRIPTION - ORIENTATION: ORIENTATION: RIGHT;UPPER

## 2023-11-20 ASSESSMENT — PAIN DESCRIPTION - LOCATION
LOCATION: ABDOMEN

## 2023-11-20 ASSESSMENT — PAIN DESCRIPTION - DESCRIPTORS: DESCRIPTORS: SHARP

## 2023-11-20 NOTE — ED TRIAGE NOTES
Pt states that her abdomen has been distended and has been experiencing RUQ abdominal pain for the past 2 weeks. Pt states that she was admitted for sepsis 2 weeks ago. Pt states that her abdomen became more distended 4 days ago. Pt denies fever/chills. Pt states that she has been experiencing n/v. Pt states that she has been experiencing blood in her stool. Pt pt denies taking blood thinners.

## 2023-11-20 NOTE — ED PROVIDER NOTES
[id number] [facility]     Pt Name: William Dixon   MRN: [id number]   [address], [place], [facility] 1980   Date of evaluation: 11/20/2023   Provider: Juan Francisco Sakrar MD   PCP: FREDRICK Whitney   Note Started: 6:11 PM EST 11/20/23     CHIEF COMPLAINT     Chief Complaint   Patient presents with    Abdominal Pain     Pt states that her abdomen has been distended and has been experiencing RUQ abdominal pain for the past 2 weeks. Pt states that she was admitted for sepsis 2 weeks ago. Pt states that her abdomen became more distended 4 days ago. Pt denies fever/chills. Pt states that she has been experiencing n/v. Pt states that she has been experiencing blood in her stool. Pt pt denies taking blood thinners. HISTORY OF PRESENT ILLNESS:  History from : Patient   Limitations to history : None     William Dixon is a 41 y.o. female who presents with complaints of right upper quadrant abdominal pain this been present over the past 2 weeks. Patient has history of chronic abdominal pain. States that her abdomen has been more distended over the past 4 days. Denies any fevers or chills. States he has had some nausea and vomiting over the past day and a half. Patient also states she noted some blood in her stool. Denies taking any blood thinners. Denies any hematemesis. States she does have history of previous hemorrhoid ectomy with banding. No recent procedures on the rectum. Denies any chest pain or shortness of breath. Has been tolerating liquids today. Patient does follow with Dr. Church, GI, who did have patient perform outpatient KUB today and after her KUB she was sent to the emergency room for further evaluation. Her KUB showed nonspecific gas pattern and moderate stool burden in the colon. Nursing Notes were all reviewed and agreed with or any disagreements were addressed in the HPI.     ROS: Positives and Pertinent negatives as per HPI.

## 2023-11-21 ENCOUNTER — CARE COORDINATION (OUTPATIENT)
Dept: CARE COORDINATION | Age: 43
End: 2023-11-21

## 2023-11-21 NOTE — CARE COORDINATION
Ambulatory Care Coordination Note  2023    Patient Current Location:  Home: [address] [place] [address] Dr Carlita Coto [id number]    ACM contacted the patient by telephone. Verified name and  with patient as identifiers. Provided introduction to self, and explanation of the ACM role. ACM: Eden Gudino RN    Challenges to be reviewed by the provider   Additional needs identified to be addressed with provider: No  Having EGD tomorrow w DR Silvino Mcknight  rectal bleeding               Method of communication with provider: chart routing. Call to patient, introduced ACM / role  Brief history obtained   Been to ER over 65 times in past year  Pt feels ab pain is r/t AV shunt  Reports [place] and  will not see her  Did see/ like Dr Mckayla Gordon- neurology w [address] on 3/2/23, ( [phone])  However reports neurosurgeons ana maria Malone and abdulaziz see her    Saw Neuro at [facility]  3/21/23  Plan:     -Per chart review patient has previously been referred to allergist by  neurosurgery to confirm shunt allergy and she declined. Unclear what testing was done by her prior allergist to confirm shunt allergy   -Will request additional prior imaging from outside facilities. Patient's ventricles are small but will need to determine if this is baseline or changed. Per outside reports this is baseline but will need to evaluate scans personally.   -Patient has follow-up with neurology for seizures to obtain EEG. Recommend she keep follow-up for this. Discussed with patient that at this time and based on information I have I would not recommend revising her shunt as this is major surgical procedure and would place her at risk for serious complications however will request additional information to evaluate prior ventricular size, prior allergy testing and information from neurology follow-up. The patient is instructed to notify this office if there is any worsening of her condition or to return to the emergency room as needed.  I will stay abreast of

## 2023-11-21 NOTE — DISCHARGE INSTRUCTIONS
Call today or tomorrow to follow up with FREDRICK Calvillo  in 4-5 days. Take your medication as prescribed. Avoid drinking alcohol or drinks that have caffeine it. Drink plenty of water or fluids like Gatorade. Return to the Emergency Department for worsening of nausea or vomiting, fever > 101.5, abdominal pain, blood in stool, vomiting blood, unable to tolerate oral fluids, continue to have vomiting for more than 24 hours, any other care or concern.

## 2023-11-21 NOTE — PROGRESS NOTES
Patient reached __X__ yes  _____ no   VM instructions left ____ yes   phone number ________                                ____ no-office notified          Date _11/22/23________  Time __1145_____  Arrival __1015  hosp-endo____    Nothing to eat or drink after midnight-follow your doctors prep instructions-this may include taking a second dose of your prep after midnight  Responsible adult 25 or older to stay on site while you are here-drive you home-stay with you after  Follow any instructions your doctors office has given you  Bring a complete list of all your medications and supplements including name,dose,how often taken the day of your procedure  If you normally take the following medications in the morning please do so the AM of your procedure with a small sip of water       Heart,blood pressure,seizure,thyroid or breathing medications-use your inhalers-bring any rescue inhalers with you DOS       DO NOT take blood pressure medications ending in \"marvin\" or \"pril\" the AM of procedure or evening prior  Dr Koko Mcdowell patients are not to take any medications the AM of surgery  Take half or your normal dose of any long acting insulins the night before your procedure-do not take any diabetic medications the AM of procedure  Follow your doctors instructions regarding stopping or taking  any blood thinners-if you do not have instructions-call them  Any questions call your doctor  Other _______none_______________________________________________________    Josiah Quick POLICY(subject to change)             The current policy is 2 visitors per patient. There are no children allowed. Mask at discretion of facility. Visiting hours are 8a-8p. Overnight visitors will be at the discretion of the nurse. All policies are subject to change.

## 2023-11-22 ENCOUNTER — ANESTHESIA EVENT (OUTPATIENT)
Dept: ENDOSCOPY | Age: 43
End: 2023-11-22
Payer: COMMERCIAL

## 2023-11-22 ENCOUNTER — ANESTHESIA (OUTPATIENT)
Dept: ENDOSCOPY | Age: 43
End: 2023-11-22
Payer: COMMERCIAL

## 2023-11-22 ENCOUNTER — HOSPITAL ENCOUNTER (OUTPATIENT)
Age: 43
Setting detail: OUTPATIENT SURGERY
Discharge: HOME | End: 2023-11-22
Attending: INTERNAL MEDICINE | Admitting: INTERNAL MEDICINE
Payer: COMMERCIAL

## 2023-11-22 VITALS
DIASTOLIC BLOOD PRESSURE: 86 MMHG | HEIGHT: 59 IN | WEIGHT: 172 LBS | BODY MASS INDEX: 34.68 KG/M2 | RESPIRATION RATE: 16 BRPM | HEART RATE: 79 BPM | SYSTOLIC BLOOD PRESSURE: 118 MMHG | OXYGEN SATURATION: 99 % | TEMPERATURE: 97.3 F

## 2023-11-22 DIAGNOSIS — R10.11 RUQ ABDOMINAL PAIN: ICD-10-CM

## 2023-11-22 DIAGNOSIS — K64.8 INTERNAL HEMORRHOIDS: ICD-10-CM

## 2023-11-22 DIAGNOSIS — K92.1 HEMATOCHEZIA: ICD-10-CM

## 2023-11-22 DIAGNOSIS — R14.0 ABDOMINAL DISTENSION: ICD-10-CM

## 2023-11-22 DIAGNOSIS — R79.89 ABNORMAL LFTS: ICD-10-CM

## 2023-11-22 LAB
GLUCOSE BLD-MCNC: 122 MG/DL (ref 70–99)
GLUCOSE BLD-MCNC: 124 MG/DL (ref 70–99)
PERFORMED ON: ABNORMAL
PERFORMED ON: ABNORMAL

## 2023-11-22 PROCEDURE — 3700000000 HC ANESTHESIA ATTENDED CARE: Performed by: INTERNAL MEDICINE

## 2023-11-22 PROCEDURE — 6360000002 HC RX W HCPCS: Performed by: REGISTERED NURSE

## 2023-11-22 PROCEDURE — 2500000003 HC RX 250 WO HCPCS: Performed by: REGISTERED NURSE

## 2023-11-22 PROCEDURE — 3700000001 HC ADD 15 MINUTES (ANESTHESIA): Performed by: INTERNAL MEDICINE

## 2023-11-22 PROCEDURE — 3609012400 HC EGD TRANSORAL BIOPSY SINGLE/MULTIPLE: Performed by: INTERNAL MEDICINE

## 2023-11-22 PROCEDURE — 2709999900 HC NON-CHARGEABLE SUPPLY: Performed by: INTERNAL MEDICINE

## 2023-11-22 PROCEDURE — 3609018500 HC EGD US SCOPE W/ADJACENT STRUCTURES: Performed by: INTERNAL MEDICINE

## 2023-11-22 PROCEDURE — 7100000010 HC PHASE II RECOVERY - FIRST 15 MIN: Performed by: INTERNAL MEDICINE

## 2023-11-22 PROCEDURE — 6360000002 HC RX W HCPCS: Performed by: ANESTHESIOLOGY

## 2023-11-22 PROCEDURE — 88305 TISSUE EXAM BY PATHOLOGIST: CPT

## 2023-11-22 PROCEDURE — 2580000003 HC RX 258: Performed by: REGISTERED NURSE

## 2023-11-22 PROCEDURE — 2580000003 HC RX 258: Performed by: INTERNAL MEDICINE

## 2023-11-22 PROCEDURE — 7100000011 HC PHASE II RECOVERY - ADDTL 15 MIN: Performed by: INTERNAL MEDICINE

## 2023-11-22 PROCEDURE — C1753 CATH, INTRAVAS ULTRASOUND: HCPCS | Performed by: INTERNAL MEDICINE

## 2023-11-22 RX ORDER — LIDOCAINE HYDROCHLORIDE 20 MG/ML
INJECTION, SOLUTION EPIDURAL; INFILTRATION; INTRACAUDAL; PERINEURAL PRN
Status: DISCONTINUED | OUTPATIENT
Start: 2023-11-22 | End: 2023-11-22 | Stop reason: SDUPTHER

## 2023-11-22 RX ORDER — SODIUM CHLORIDE 9 MG/ML
INJECTION, SOLUTION INTRAVENOUS CONTINUOUS PRN
Status: DISCONTINUED | OUTPATIENT
Start: 2023-11-22 | End: 2023-11-22 | Stop reason: SDUPTHER

## 2023-11-22 RX ORDER — PROPOFOL 10 MG/ML
INJECTION, EMULSION INTRAVENOUS PRN
Status: DISCONTINUED | OUTPATIENT
Start: 2023-11-22 | End: 2023-11-22 | Stop reason: SDUPTHER

## 2023-11-22 RX ORDER — SODIUM CHLORIDE 9 MG/ML
INJECTION, SOLUTION INTRAVENOUS CONTINUOUS
Status: DISCONTINUED | OUTPATIENT
Start: 2023-11-22 | End: 2023-11-22 | Stop reason: HOSPADM

## 2023-11-22 RX ORDER — ONDANSETRON 2 MG/ML
4 INJECTION INTRAMUSCULAR; INTRAVENOUS EVERY 6 HOURS PRN
Status: DISCONTINUED | OUTPATIENT
Start: 2023-11-22 | End: 2023-11-22 | Stop reason: HOSPADM

## 2023-11-22 RX ADMIN — PROPOFOL 100 MG: 10 INJECTION, EMULSION INTRAVENOUS at 10:06

## 2023-11-22 RX ADMIN — ONDANSETRON 4 MG: 2 INJECTION INTRAMUSCULAR; INTRAVENOUS at 11:11

## 2023-11-22 RX ADMIN — PROPOFOL 140 MCG/KG/MIN: 10 INJECTION, EMULSION INTRAVENOUS at 10:07

## 2023-11-22 RX ADMIN — LIDOCAINE HYDROCHLORIDE 100 MG: 20 INJECTION, SOLUTION EPIDURAL; INFILTRATION; INTRACAUDAL; PERINEURAL at 10:06

## 2023-11-22 RX ADMIN — SODIUM CHLORIDE: 9 INJECTION, SOLUTION INTRAVENOUS at 09:43

## 2023-11-22 RX ADMIN — SODIUM CHLORIDE: 9 INJECTION, SOLUTION INTRAVENOUS at 10:03

## 2023-11-22 ASSESSMENT — PAIN DESCRIPTION - ORIENTATION
ORIENTATION: RIGHT

## 2023-11-22 ASSESSMENT — PAIN DESCRIPTION - PAIN TYPE
TYPE: CHRONIC PAIN

## 2023-11-22 ASSESSMENT — LIFESTYLE VARIABLES: SMOKING_STATUS: 1

## 2023-11-22 ASSESSMENT — PAIN DESCRIPTION - DESCRIPTORS
DESCRIPTORS: ACHING
DESCRIPTORS: SHARP
DESCRIPTORS: ACHING

## 2023-11-22 ASSESSMENT — PAIN SCALES - GENERAL
PAINLEVEL_OUTOF10: 8
PAINLEVEL_OUTOF10: 0

## 2023-11-22 ASSESSMENT — PAIN DESCRIPTION - LOCATION
LOCATION: ABDOMEN

## 2023-11-22 ASSESSMENT — PAIN DESCRIPTION - FREQUENCY
FREQUENCY: CONTINUOUS

## 2023-11-22 NOTE — ANESTHESIA POSTPROCEDURE EVALUATION
Department of Anesthesiology  Postprocedure Note    Patient: Grant Hobbs  MRN: [id number]  [facility] 1980  Date of evaluation: 11/22/2023      Procedure Summary     Date: 11/22/23 Room / Location: [address] [place] [address]    Anesthesia Start: 1003 Anesthesia Stop: 1036    Procedures:       EGD ESOPHAGOGASTRODUODENOSCOPY ULTRASOUND      EGD BIOPSY (Abdomen) Diagnosis:       RUQ abdominal pain      Abnormal LFTs      Hematochezia      Abdominal distension      Internal hemorrhoids      (RUQ abdominal pain [R10.11])      (Abnormal LFTs [R79.89])      (Hematochezia [K92.1])      (Abdominal distension [R14.0])      (Internal hemorrhoids [K64.8])    Surgeons: Salbador Rodriguez MD Responsible Provider: Babak Tijerina MD    Anesthesia Type: general ASA Status: 3          Anesthesia Type: No value filed.     Harlan Phase I:      Harlan Phase II:        Anesthesia Post Evaluation    Patient location during evaluation: PACU  Patient participation: complete - patient participated  Level of consciousness: awake  Airway patency: patent  Nausea & Vomiting: no vomiting and no nausea  Complications: no  Cardiovascular status: hemodynamically stable  Respiratory status: acceptable  Hydration status: stable  Multimodal analgesia pain management approach  Pain management: adequate

## 2023-11-22 NOTE — DISCHARGE INSTRUCTIONS
rectal bleeding with large internal hemorrhoids seen on colonoscopy over the summer. 3.  Will plan MRI pancreas in 2 years (I put in our system) to monitor the pancreatic cysts. 4.  Please check [facility] portal for biopsy results in 1 week. If you do not know how to check the [facility] portal, call [phone] for instructions. Amy Loo MD  [facility]      Endoscopy Discharge Instructions    Call with any questions or concerns. You may be drowsy or lightheaded after receiving sedation. DO NOT operate  a vehicle (automobile, bicycle, motorcycle, machinery, or power tools), no  alcoholic beverages, and do not make any important decisions today. Plan on bed rest or quiet relaxation today. Resume normal activities in the morning. Resume normal activity tomorrow unless otherwise advised by your physician. Eat a light first meal, avoiding spicy and fatty foods, then resume normal diet unless  you are told otherwise by your physician. If the intravenous medication site is painful, apply warm compresses on the site until the soreness is relieved and elevate the arm above the heart. Call your physician if no improvement  in 2-3 days. POSSIBLE SYMPTOMS TO WATCH:     1. fever (greater than 100) 5. increased abdominal bloating   2. severe pain   6. excessive bleeding   3. nausea and vomiting  7. chest pain   4. chills    8. shortness of breath       Notify us if these problems occur     Expected as normal and remedies:  Sore throat: use over the counter throat lozenges or gargle with warm salt water. Redness or soreness at the IV site: apply warm compress  Gaseous discomfort: belching or passing flatus (gas).

## 2023-11-22 NOTE — OP NOTE
Endoscopy Note    Patient: Sherman Galarza   : 1980  Acct#:     Procedure: Endoscopic ultrasound  Doppler of mesenteric vessels  EGD with biopsy    Surgeon:  Bijal Richard MD    Anesthesia:  MAC per Anesthesia. Indications: This is a 41y.o. year old female who presents today with RUQ abdominal pain, dilated bile duct, and abnormal LFT's. Consent: Risks, benefits, and alternatives were explained and informed consent was obtained. Monitoring:  Patient was monitored with continuous pulse oximetry, telemetry, and intermittent blood pressures. Details of the Procedure: The patient was then taken to the endoscopy suite. A time-out was performed. The patient and staff were in agreement as to the correct patient and procedure. The above anesthesia was administered by the anesthesia department. The patient was placed in the left lateral position. The Olympus videoendoscope was placed in the patient's mouth and under direct visualization passed into the esophagus and advanced without difficulty to the 2nd portion of the duodenum. Views were good, patient toleration was good. Retroflexion was performed in the stomach. Findings:  1. The esophagus appeared normal without evidence of Davis's esophagus or reflux esophagitis. 2.  Small sliding hiatal hernia. 3.  There was mild gastritis. Biopsies were obtained from the antrum and body of the stomach to evaluate for H. Pylori. 4.  Normal duodenum. Next, the curvilinear array echoendoscope was advanced without difficulty to the 2nd portion of the duodenum. Endosonographic views were good, patient toleration was good. Findings:   Major Papilla: Endoscopically, the major papilla was normal.  Endosonographically, the major papilla appeared normal  Pancreas: The pancreatic duct measured 1.2mm off the major papilla in the head of the pancreas and tapered normally in the body and tail of the pancreas.   The pancreatic parenchyma showed

## 2023-11-22 NOTE — PROGRESS NOTES
Transportation arranged by [facility]. Pt ready for discharge - pt discharged in stable condition.

## 2023-11-22 NOTE — PROGRESS NOTES
Reviewed patient's medical and surgical history in electronic record and with patient at the bedside. All questions regarding procedure answered. Scope number and equipment verified using a two person system.  \    Electronically signed by Court Verduzco RN on 11/22/2023 at 10:06 AM

## 2023-11-22 NOTE — ADDENDUM NOTE
Addendum  created 11/22/23 1104 by Day Ness MD    Order list changed, Pharmacy for encounter modified

## 2023-11-22 NOTE — H&P
Ceftaroline, Daptomycin, Flagyl [metronidazole], Haldol [haloperidol], Keppra [levetiracetam], Ketamine, Methocarbamol, Nitrofurantoin, Prochlorperazine, Reglan [metoclopramide], Silicone, and Tazobactam    Social History:   Social History     Socioeconomic History    Marital status:      Spouse name: Not on file    Number of children: Not on file    Years of education: Not on file    Highest education level: Not on file   Occupational History    Occupation: disability, SSI    Tobacco Use    Smoking status: Every Day     Packs/day: 0.25     Years: 15.00     Additional pack years: 0.00     Total pack years: 3.75     Types: Cigarettes     Start date: 10/10/2000    Smokeless tobacco: Never   Vaping Use    Vaping Use: Never used   Substance and Sexual Activity    Alcohol use: No    Drug use: Never    Sexual activity: Yes     Partners: Male   Other Topics Concern    Not on file   Social History Narrative    Not on file     Social Determinants of Health     Financial Resource Strain: Low Risk  (10/9/2023)    Overall Financial Resource Strain (CARDIA)     Difficulty of Paying Living Expenses: Not very hard   Food Insecurity: No Food Insecurity (10/9/2023)    Hunger Vital Sign     Worried About Running Out of Food in the Last Year: Never true     Ran Out of Food in the Last Year: Never true   Transportation Needs: No Transportation Needs (11/17/2023)    Transportation Problems [facility] HRSN)     In the past 12 months, has lack of reliable transportation kept you from medical appointments, meetings, work or from getting things needed for daily living?: Not on file   Physical Activity: Unknown (11/13/2023)    Exercise Vital Sign     Days of Exercise per Week: Patient refused     Minutes of Exercise per Session: 100 min   Stress: Not on file   Social Connections: Not on file   Intimate Partner Violence: Not At Risk (11/13/2023)    Humiliation, Afraid, Rape, and Kick questionnaire     Fear of Current or Ex-Partner: No

## 2023-11-25 SDOH — HEALTH STABILITY: PHYSICAL HEALTH: ON AVERAGE, HOW MANY MINUTES DO YOU ENGAGE IN EXERCISE AT THIS LEVEL?: 0 MIN

## 2023-11-25 SDOH — HEALTH STABILITY: PHYSICAL HEALTH: ON AVERAGE, HOW MANY DAYS PER WEEK DO YOU ENGAGE IN MODERATE TO STRENUOUS EXERCISE (LIKE A BRISK WALK)?: 0 DAYS

## 2023-11-25 ASSESSMENT — SOCIAL DETERMINANTS OF HEALTH (SDOH)
WITHIN THE LAST YEAR, HAVE YOU BEEN HUMILIATED OR EMOTIONALLY ABUSED IN OTHER WAYS BY YOUR PARTNER OR EX-PARTNER?: NO
WITHIN THE LAST YEAR, HAVE TO BEEN RAPED OR FORCED TO HAVE ANY KIND OF SEXUAL ACTIVITY BY YOUR PARTNER OR EX-PARTNER?: NO
WITHIN THE LAST YEAR, HAVE YOU BEEN KICKED, HIT, SLAPPED, OR OTHERWISE PHYSICALLY HURT BY YOUR PARTNER OR EX-PARTNER?: NO
WITHIN THE LAST YEAR, HAVE YOU BEEN AFRAID OF YOUR PARTNER OR EX-PARTNER?: NO

## 2023-11-28 ENCOUNTER — OFFICE VISIT (OUTPATIENT)
Dept: ORTHOPEDIC SURGERY | Age: 43
End: 2023-11-28
Payer: COMMERCIAL

## 2023-11-28 VITALS — WEIGHT: 171.96 LBS | HEIGHT: 59 IN | BODY MASS INDEX: 34.67 KG/M2

## 2023-11-28 DIAGNOSIS — M47.816 LUMBAR SPONDYLOSIS: Primary | ICD-10-CM

## 2023-11-28 PROCEDURE — 1111F DSCHRG MED/CURRENT MED MERGE: CPT | Performed by: ORTHOPAEDIC SURGERY

## 2023-11-28 PROCEDURE — G8484 FLU IMMUNIZE NO ADMIN: HCPCS | Performed by: ORTHOPAEDIC SURGERY

## 2023-11-28 PROCEDURE — G8427 DOCREV CUR MEDS BY ELIG CLIN: HCPCS | Performed by: ORTHOPAEDIC SURGERY

## 2023-11-28 PROCEDURE — 4004F PT TOBACCO SCREEN RCVD TLK: CPT | Performed by: ORTHOPAEDIC SURGERY

## 2023-11-28 PROCEDURE — G8417 CALC BMI ABV UP PARAM F/U: HCPCS | Performed by: ORTHOPAEDIC SURGERY

## 2023-11-28 PROCEDURE — 99213 OFFICE O/P EST LOW 20 MIN: CPT | Performed by: ORTHOPAEDIC SURGERY

## 2023-11-28 NOTE — PROGRESS NOTES
New Patient: LUMBAR SPINE    Referring Provider:  Bertis Soulier, APRN    CHIEF COMPLAINT:    Chief Complaint   Patient presents with    Back Problem     NP Lumbar       HISTORY OF PRESENT ILLNESS:    Ms. Shahla Orellana  is a pleasant 41 y.o. female presents today for evaluation of chronic low back and left leg pain. Her symptoms began insidiously a few years ago. She rates her pain 7/10 she reports numbness tingling weakness in a global fashion in her left leg. .  She denies saddle anesthesia and bowel or bladder dysfunction. Current/Past Treatment:   Physical Therapy: yes  Chiropractic:  no   Injection:  no   Medications: Gabapentin    Past Medical History:   Past Medical History:   Diagnosis Date    ADHD (attention deficit hyperactivity disorder) 1988    Depression with anxiety     Difficult intubation     airway swelled up    Drug-seeking behavior     Encounter for imaging to screen for metal prior to MRI 06/01/2021    MRI Conditional Medtronic Non-Programmable shunt model#[id number] implanted 10/30/2020 at [facility]. Normal Mode. 1.5T or 3.0T. ESBL (extended spectrum beta-lactamase) producing bacteria infection 11/06/2019    urine    Functional ovarian cysts 2008    rt ovary cyst x 2 yrs.     GERD (gastroesophageal reflux disease) When I was a kid    Headache(784.0)     migraines    History of blood transfusion     at birth    History of kidney stones     History of PCOS     had hysterectomy in 2016    Hydrocephalus [facility])     Hyperlipidemia     Irritable bowel syndrome 2004    had colonoscopy about 6 yrs ago    Meningitis 79/3171    Neutrophilic leukocytosis     Nicotine dependence     PONV (postoperative nausea and vomiting)     very nauseated and sometimes wakes up with a Migraine--happened once after brain surgery    Prediabetes     Primary osteoarthritis of left knee 07/01/2016    S/P cone biopsy of cervix 2004    Scoliosis 1990.s    Seizures ([address])     twice--Nov 2022, [id number]

## 2023-11-29 ENCOUNTER — CARE COORDINATION (OUTPATIENT)
Dept: CARE COORDINATION | Age: 43
End: 2023-11-29

## 2023-11-29 NOTE — CARE COORDINATION
Ambulatory Care Coordination Note  2023    Patient Current Location:  Home: [address] [place] [address] Dr Rickie Bloch [id number]     ACM contacted the patient by telephone. Verified name and  with patient as identifiers. Provided introduction to self, and explanation of the ACM role. Challenges to be reviewed by the provider   Additional needs identified to be addressed with provider: Yes  Consider w follow up Neuro at [facility] get to [place] to see neurosurgeon . Jaky Zhou Having cont ab pain/ diarrhea. Bloating            Method of communication with provider: chart routing. Scheduled for VV w PCP for      Per DR Arvis Homans s/p EGD  Impression:  Small sliding hiatal hernia. Mild gastritis biopsied. Multiple tiny 2-3mm cysts in the tail of the pancreas without alarming features. Mild dilation of the bile duct without stones or strictures. Recommendations:  1. Clear liquid diet advance as tolerated. 2. See Dr. Jeny Wilson for the rectal bleeding with large internal hemorrhoids seen on colonoscopy over the summer. 3. Will plan MRI pancreas in 2 years (I put in our system) to monitor the pancreatic cysts. 4. Please check [facility] portal for biopsy results in 1 week. If you do not know how to check the [facility] portal,  call [phone] for instructions. Leatha Chaidez MD  [facility]  Endoscopy Discharge Instructions  Call with any questions or concerns. You may be drowsy or lightheaded after receiving sedation. DO NOT operate  a vehicle (automobile, bicycle, motorcycle, machinery, or power tools), no  alcoholic beverages, and do not make any important decisions today. Plan on bed rest or quiet relaxation today. Resume normal activities in the morning. Resume normal activity tomorrow unless otherwise advised by your physician. Eat a light first meal, avoiding spicy and fatty foods, then resume normal diet unless you are told otherwise by your  physician.   If the intravenous medication site is

## 2023-11-30 ENCOUNTER — CARE COORDINATION (OUTPATIENT)
Dept: CARE COORDINATION | Age: 43
End: 2023-11-30

## 2023-11-30 ENCOUNTER — TELEMEDICINE (OUTPATIENT)
Dept: INTERNAL MEDICINE CLINIC | Age: 43
End: 2023-11-30
Payer: COMMERCIAL

## 2023-11-30 DIAGNOSIS — R11.2 NAUSEA AND VOMITING, UNSPECIFIED VOMITING TYPE: ICD-10-CM

## 2023-11-30 DIAGNOSIS — R14.0 ABDOMINAL DISTENSION: ICD-10-CM

## 2023-11-30 DIAGNOSIS — R10.32 LLQ PAIN: ICD-10-CM

## 2023-11-30 DIAGNOSIS — R10.11 RUQ PAIN: ICD-10-CM

## 2023-11-30 DIAGNOSIS — R73.03 PREDIABETES: ICD-10-CM

## 2023-11-30 DIAGNOSIS — R19.7 DIARRHEA, UNSPECIFIED TYPE: Primary | ICD-10-CM

## 2023-11-30 PROCEDURE — 1111F DSCHRG MED/CURRENT MED MERGE: CPT | Performed by: NURSE PRACTITIONER

## 2023-11-30 PROCEDURE — 99214 OFFICE O/P EST MOD 30 MIN: CPT | Performed by: NURSE PRACTITIONER

## 2023-11-30 PROCEDURE — G8427 DOCREV CUR MEDS BY ELIG CLIN: HCPCS | Performed by: NURSE PRACTITIONER

## 2023-11-30 RX ORDER — PROMETHAZINE HYDROCHLORIDE 25 MG/1
25 TABLET ORAL 4 TIMES DAILY PRN
Qty: 20 TABLET | Refills: 0 | Status: SHIPPED | OUTPATIENT
Start: 2023-11-30 | End: 2023-12-07

## 2023-11-30 RX ORDER — LANCETS 30 GAUGE
1 EACH MISCELLANEOUS DAILY
Qty: 100 EACH | Refills: 1 | Status: SHIPPED | OUTPATIENT
Start: 2023-11-30

## 2023-11-30 RX ORDER — BLOOD-GLUCOSE METER
1 KIT MISCELLANEOUS DAILY
Qty: 1 KIT | Refills: 0 | Status: SHIPPED | OUTPATIENT
Start: 2023-11-30

## 2023-11-30 RX ORDER — AMOXICILLIN AND CLAVULANATE POTASSIUM 875; 125 MG/1; MG/1
1 TABLET, FILM COATED ORAL 2 TIMES DAILY
Qty: 20 TABLET | Refills: 0 | Status: SHIPPED | OUTPATIENT
Start: 2023-11-30 | End: 2023-12-10

## 2023-11-30 RX ORDER — PEN NEEDLE, DIABETIC 31 GX5/16"
1 NEEDLE, DISPOSABLE MISCELLANEOUS 2 TIMES DAILY
Qty: 100 EACH | Refills: 1 | Status: SHIPPED | OUTPATIENT
Start: 2023-11-30

## 2023-11-30 RX ORDER — GLUCOSAMINE HCL/CHONDROITIN SU 500-400 MG
CAPSULE ORAL
Qty: 100 STRIP | Refills: 1 | Status: SHIPPED | OUTPATIENT
Start: 2023-11-30

## 2023-11-30 ASSESSMENT — ENCOUNTER SYMPTOMS
VOMITING: 1
NAUSEA: 1
DIARRHEA: 1
ABDOMINAL DISTENTION: 1
ABDOMINAL PAIN: 1

## 2023-11-30 NOTE — CARE COORDINATION
Ambulatory Care Coordination Note  2023    Patient Current Location:  Home: [address] [place] [address] Dr Vincenzo Duncan [id number]     ACM contacted the patient by telephone. Verified name and  with patient as identifiers. Provided introduction to self, and explanation of the ACM role. Challenges to be reviewed by the provider   Additional needs identified to be addressed with provider: Yes                  Method of communication with provider: chart routing. ACM: Selin Shaikh RN  Call to patient  Reports diarrhea persists  Will take over stool sample tomorrow. ... PLAN  Will call  GI second opinion  Internal hemmorrhoids- dr Kevin Mckeon, or cyn  GI  Rd referral      Offered patient enrollment in the Remote Patient Monitoring (RPM) program for in-home monitoring: Patient declined. Lab Results       None            Care Coordination Interventions    Referral from Primary Care Provider: No  Suggested Interventions and Community Resources          Goals Addressed    None         No future appointments.

## 2023-11-30 NOTE — PROGRESS NOTES
APRN   atorvastatin (LIPITOR) 10 MG tablet Take 1 tablet by mouth at bedtime Yes FREDRICK Ely   lamoTRIgine (LAMICTAL) 25 MG tablet Take 4 tablets by mouth at bedtime Yes Provider, Historical, MD       Social History     Tobacco Use    Smoking status: Every Day     Packs/day: 0.25     Years: 15.00     Additional pack years: 0.00     Total pack years: 3.75     Types: Cigarettes     Start date: 10/10/2000    Smokeless tobacco: Never   Vaping Use    Vaping Use: Never used   Substance Use Topics    Alcohol use: No    Drug use: Never        Allergies   Allergen Reactions    Bee Venom Shortness Of Breath and Swelling    Bentyl [Dicyclomine Hcl] Shortness Of Breath and Anxiety    Dicyclomine Anxiety and Shortness Of Breath    Ketorolac Tromethamine Hives and Itching     Injectable only  Tolerates PO NSAIDs fine    Levofloxacin Anxiety and Hives    Maitake Anaphylaxis    Shiitake Mushroom Anaphylaxis     Can not eat mushrooms    Sulfa Antibiotics Shortness Of Breath    Vancomycin Anaphylaxis and Hives    Zosyn [Piperacillin Sod-Tazobactam So] Hives, Shortness Of Breath, Nausea Only and Dizziness or Vertigo    Adhesive Tape Dermatitis     Other reaction(s): Dermatitis    Sulfacetamide Hives    Acetaminophen Anxiety     Patient reports when taking acetaminophen (including Norco/Percocet) she gets severe anxiety when taking this medication. Butalbital-Apap-Caff-Cod Anxiety    Ceftaroline Rash     Does tolerate cefepime and has had doses before, confirmed 10/9/2022 with pharmacy    Daptomycin Swelling and Rash    Flagyl [Metronidazole] Nausea And Vomiting    Haldol [Haloperidol] Anxiety    Keppra [Levetiracetam] Itching and Rash     Per patient itching and rash.      Ketamine Nausea And Vomiting and Anxiety    Methocarbamol Rash    Nitrofurantoin Nausea And Vomiting     \"projectile vomiting\"    Prochlorperazine Anxiety     Patient tells me she takes phenergan    Reglan [Metoclopramide] Anxiety    Silicone Hives,

## 2023-12-01 ENCOUNTER — CARE COORDINATION (OUTPATIENT)
Dept: CARE COORDINATION | Age: 43
End: 2023-12-01

## 2023-12-01 ENCOUNTER — TELEPHONE (OUTPATIENT)
Dept: INTERNAL MEDICINE CLINIC | Age: 43
End: 2023-12-01

## 2023-12-01 NOTE — TELEPHONE ENCOUNTER
Spoke to patient and she states that she is unable to take the Augmentin as she is allergic to Pipercillin. Christie Christopher recommended that patient go to the ER if she is super uncomfortable  ( pt declines). She can come to our office to  containers for stool specimen. She will  on Monday.

## 2023-12-01 NOTE — TELEPHONE ENCOUNTER
Pateint wwent to pharmacey and they would not let her have the amoxicillin-clavulanate (AUGMENTIN) 875-125 MG per tablet due to her being allergic to a similar medication.    Patient would like a call with any suggestions

## 2023-12-07 ENCOUNTER — OFFICE VISIT (OUTPATIENT)
Dept: SURGERY | Age: 43
End: 2023-12-07
Payer: COMMERCIAL

## 2023-12-07 VITALS — DIASTOLIC BLOOD PRESSURE: 82 MMHG | SYSTOLIC BLOOD PRESSURE: 127 MMHG

## 2023-12-07 DIAGNOSIS — R10.30 LOWER ABDOMINAL PAIN: Primary | ICD-10-CM

## 2023-12-07 PROCEDURE — 99212 OFFICE O/P EST SF 10 MIN: CPT | Performed by: SURGERY

## 2023-12-07 NOTE — CARE COORDINATION
RD referred to patient for nutrition counseling 2/2 Diarrhea. RD spoke with patient who explains that the diarrhea is not nutrition-related and likely related to shunt and cyst on Pancreas. Patient is declining nutrition counseling at this time, but expresses interest in weight management when she is ready. Patient states that she will reach out to RD when she is ready. RD will sign off.     Electronically signed by Yogesh Amaya RD on 12/7/2023 at 10:13 AM

## 2023-12-07 NOTE — PROGRESS NOTES
Lindsey Ruggiero (:  1980) is a 41 y.o. female,Established patient, here for evaluation of the following chief complaint(s):  Follow-up (Pt is here today for an umbilical hernia. )         ASSESSMENT/PLAN:  1. Lower abdominal pain    Follow up with me as needed           Subjective   SUBJECTIVE/OBJECTIVE:  HPI  Recent CT from ER reviewed. She has a 2-3 mm umbilical hernia defect without any protrusion and a 2-3 mm defect just caudal and to the right of the umbilicus without protrusion. I do not this this is the source of her pain and do not recommend additional surgery at this time. No other abnormal findings on CT. Follow up with me as needed    Review of Systems       Objective   Physical Exam             An electronic signature was used to authenticate this note.     --Jamie Britt MD

## 2023-12-11 ENCOUNTER — CARE COORDINATION (OUTPATIENT)
Dept: CARE COORDINATION | Age: 43
End: 2023-12-11

## 2023-12-12 ENCOUNTER — CARE COORDINATION (OUTPATIENT)
Dept: CARE COORDINATION | Age: 43
End: 2023-12-12

## 2023-12-12 NOTE — CARE COORDINATION
Ambulatory Care Coordination Note  2023    Patient Current Location:  Home: [id number] [place] [address] Dr Jayesh Thompson [id number]     ACM contacted the patient by telephone. Verified name and  with patient as identifiers. Provided introduction to self, and explanation of the ACM role. Challenges to be reviewed by the provider   Additional needs identified to be addressed with provider: No  none    PLAN    Will provide stool sample               Method of communication with provider: chart routing. ACM: Walt Tong, RN    Return call from patient  Reports diarrhea, vomiting, nausea persists    Has VV w Dr Jian Livingston yesterday , aware of continued diarrhea, n/v.      Years ago , Dr Ernesto Moraes at [facility]-- retired now  Then Dr Obey Medel it is her shunt,, had surgery in [place] in  , see below: Dr Al Lopez , DUANE Sotelo, FREDRICK - 2020 2:40 PM EST  Formatting of this note might be different from the original.  Lidia Bianka  1980    DATE OF VISIT:  2020   30-year-old female with a history of congenital hydrocephalus status post  shunt placement. She has had 3 revisions with earliest being when she was 17 secondary to a pulling sensation in her neck. This lasted until she was in her mid 35s when she developed headaches and dizziness prompting shunt revision. Shortly thereafter she developed a shunt infection requiring explantation with external ventricular drainage secondary to pseudocyst and subsequent reinsertion.   She has presented to [address] [place] [address] approximately 5 times in the recent history for head fullness nausea and vomiting but they discharged her so her sister urged her to come to [address] [place] [address] comes here for post-op visit:    SURGERY:  10/30/2020  Removal of left frontal EVD  left ventriculoperitoneal shunt  Medtronic medium pressure valve; Medtronic Kenroy antibiotic-impregnated ventricular and peritoneal catheters  Nicky Gandhi

## 2023-12-12 NOTE — CARE COORDINATION
Call to patient, LM on VM with this [facility] name and number  PLAN  Will call [facility] second opinion-- nothing noted in chart review  Internal hemmorrhoids- dr Kim Winston, or [facility]--  Rd referral-- done    Per chart review, has not dropped off stool sample due to diarrhea    Did see surgeon , Dr Sheridan Campbell on , see below    Pop (:  1980) is a 41 y.o. female,Established patient, here for evaluation of the following chief complaint(s):  Follow-up (Pt is here today for an umbilical hernia. )        ASSESSMENT/PLAN:  1. Lower abdominal pain     Follow up with me as needed           Subjective   SUBJECTIVE/OBJECTIVE:  HPI  Recent CT from ER reviewed. She has a 2-3 mm umbilical hernia defect without any protrusion and a 2-3 mm defect just caudal and to the right of the umbilicus without protrusion. I do not this this is the source of her pain and do not recommend additional surgery at this time. No other abnormal findings on CT.  Follow up with me as needed

## 2023-12-13 ENCOUNTER — HOSPITAL ENCOUNTER (EMERGENCY)
Age: 43
Discharge: HOME | End: 2023-12-13
Payer: COMMERCIAL

## 2023-12-13 ENCOUNTER — APPOINTMENT (OUTPATIENT)
Dept: CT IMAGING | Age: 43
End: 2023-12-13
Payer: COMMERCIAL

## 2023-12-13 VITALS
BODY MASS INDEX: 34.84 KG/M2 | HEART RATE: 81 BPM | TEMPERATURE: 98.9 F | HEIGHT: 59 IN | OXYGEN SATURATION: 100 % | RESPIRATION RATE: 13 BRPM | DIASTOLIC BLOOD PRESSURE: 78 MMHG | WEIGHT: 172.84 LBS | SYSTOLIC BLOOD PRESSURE: 114 MMHG

## 2023-12-13 DIAGNOSIS — R10.11 ABDOMINAL PAIN, RIGHT UPPER QUADRANT: Primary | ICD-10-CM

## 2023-12-13 DIAGNOSIS — R11.2 NAUSEA VOMITING AND DIARRHEA: ICD-10-CM

## 2023-12-13 DIAGNOSIS — R19.7 NAUSEA VOMITING AND DIARRHEA: ICD-10-CM

## 2023-12-13 LAB
ALBUMIN SERPL-MCNC: 4.4 G/DL (ref 3.4–5)
ALBUMIN/GLOB SERPL: 1.6 {RATIO} (ref 1.1–2.2)
ALP SERPL-CCNC: 100 U/L (ref 40–129)
ALT SERPL-CCNC: 12 U/L (ref 10–40)
ANION GAP SERPL CALCULATED.3IONS-SCNC: 14 MMOL/L (ref 3–16)
AST SERPL-CCNC: 11 U/L (ref 15–37)
BASOPHILS # BLD: 0.1 K/UL (ref 0–0.2)
BASOPHILS NFR BLD: 0.6 %
BILIRUB SERPL-MCNC: <0.2 MG/DL (ref 0–1)
BILIRUB UR QL STRIP.AUTO: NEGATIVE
BUN SERPL-MCNC: 9 MG/DL (ref 7–20)
CALCIUM SERPL-MCNC: 8.9 MG/DL (ref 8.3–10.6)
CHLORIDE SERPL-SCNC: 102 MMOL/L (ref 99–110)
CLARITY UR: CLEAR
CO2 SERPL-SCNC: 22 MMOL/L (ref 21–32)
COLOR UR: YELLOW
CREAT SERPL-MCNC: <0.5 MG/DL (ref 0.6–1.1)
DEPRECATED RDW RBC AUTO: 14.3 % (ref 12.4–15.4)
EOSINOPHIL # BLD: 0.2 K/UL (ref 0–0.6)
EOSINOPHIL NFR BLD: 1.5 %
FLUAV RNA UPPER RESP QL NAA+PROBE: NEGATIVE
FLUBV AG NPH QL: NEGATIVE
GFR SERPLBLD CREATININE-BSD FMLA CKD-EPI: >60 ML/MIN/{1.73_M2}
GLUCOSE SERPL-MCNC: 200 MG/DL (ref 70–99)
GLUCOSE UR STRIP.AUTO-MCNC: NEGATIVE MG/DL
HCT VFR BLD AUTO: 41.7 % (ref 36–48)
HGB BLD-MCNC: 14.3 G/DL (ref 12–16)
HGB UR QL STRIP.AUTO: NEGATIVE
KETONES UR STRIP.AUTO-MCNC: NEGATIVE MG/DL
LEUKOCYTE ESTERASE UR QL STRIP.AUTO: NEGATIVE
LIPASE SERPL-CCNC: 18 U/L (ref 13–60)
LYMPHOCYTES # BLD: 2.5 K/UL (ref 1–5.1)
LYMPHOCYTES NFR BLD: 23.4 %
MAGNESIUM SERPL-MCNC: 2.2 MG/DL (ref 1.8–2.4)
MCH RBC QN AUTO: 29.3 PG (ref 26–34)
MCHC RBC AUTO-ENTMCNC: 34.2 G/DL (ref 31–36)
MCV RBC AUTO: 85.6 FL (ref 80–100)
MONOCYTES # BLD: 0.5 K/UL (ref 0–1.3)
MONOCYTES NFR BLD: 4.8 %
NEUTROPHILS # BLD: 7.3 K/UL (ref 1.7–7.7)
NEUTROPHILS NFR BLD: 69.7 %
NITRITE UR QL STRIP.AUTO: NEGATIVE
PH UR STRIP.AUTO: 6.5 [PH] (ref 5–8)
PLATELET # BLD AUTO: 268 K/UL (ref 135–450)
PMV BLD AUTO: 8.1 FL (ref 5–10.5)
POTASSIUM SERPL-SCNC: 3.3 MMOL/L (ref 3.5–5.1)
PROT SERPL-MCNC: 7.1 G/DL (ref 6.4–8.2)
PROT UR STRIP.AUTO-MCNC: NEGATIVE MG/DL
RBC # BLD AUTO: 4.88 M/UL (ref 4–5.2)
SARS-COV-2 RDRP RESP QL NAA+PROBE: NOT DETECTED
SODIUM SERPL-SCNC: 138 MMOL/L (ref 136–145)
SP GR UR STRIP.AUTO: 1 (ref 1–1.03)
UA COMPLETE W REFLEX CULTURE PNL UR: NORMAL
UA DIPSTICK W REFLEX MICRO PNL UR: NORMAL
URN SPEC COLLECT METH UR: NORMAL
UROBILINOGEN UR STRIP-ACNC: 0.2 E.U./DL
WBC # BLD AUTO: 10.5 K/UL (ref 4–11)

## 2023-12-13 PROCEDURE — 6360000004 HC RX CONTRAST MEDICATION: Performed by: PHYSICIAN ASSISTANT

## 2023-12-13 PROCEDURE — 87635 SARS-COV-2 COVID-19 AMP PRB: CPT

## 2023-12-13 PROCEDURE — 2500000003 HC RX 250 WO HCPCS: Performed by: PHYSICIAN ASSISTANT

## 2023-12-13 PROCEDURE — 96375 TX/PRO/DX INJ NEW DRUG ADDON: CPT

## 2023-12-13 PROCEDURE — 99285 EMERGENCY DEPT VISIT HI MDM: CPT

## 2023-12-13 PROCEDURE — 83735 ASSAY OF MAGNESIUM: CPT

## 2023-12-13 PROCEDURE — 83690 ASSAY OF LIPASE: CPT

## 2023-12-13 PROCEDURE — 6370000000 HC RX 637 (ALT 250 FOR IP): Performed by: PHYSICIAN ASSISTANT

## 2023-12-13 PROCEDURE — 81003 URINALYSIS AUTO W/O SCOPE: CPT

## 2023-12-13 PROCEDURE — 6360000002 HC RX W HCPCS: Performed by: PHYSICIAN ASSISTANT

## 2023-12-13 PROCEDURE — 96365 THER/PROPH/DIAG IV INF INIT: CPT

## 2023-12-13 PROCEDURE — 74177 CT ABD & PELVIS W/CONTRAST: CPT

## 2023-12-13 PROCEDURE — 87804 INFLUENZA ASSAY W/OPTIC: CPT

## 2023-12-13 PROCEDURE — 2580000003 HC RX 258: Performed by: PHYSICIAN ASSISTANT

## 2023-12-13 PROCEDURE — 80053 COMPREHEN METABOLIC PANEL: CPT

## 2023-12-13 PROCEDURE — 85025 COMPLETE CBC W/AUTO DIFF WBC: CPT

## 2023-12-13 RX ORDER — OXYCODONE HYDROCHLORIDE 5 MG/1
5 TABLET ORAL ONCE
Status: DISCONTINUED | OUTPATIENT
Start: 2023-12-13 | End: 2023-12-13

## 2023-12-13 RX ORDER — ONDANSETRON 4 MG/1
4 TABLET, ORALLY DISINTEGRATING ORAL 3 TIMES DAILY PRN
Qty: 21 TABLET | Refills: 0 | Status: SHIPPED | OUTPATIENT
Start: 2023-12-13

## 2023-12-13 RX ORDER — 0.9 % SODIUM CHLORIDE 0.9 %
500 INTRAVENOUS SOLUTION INTRAVENOUS ONCE
Status: COMPLETED | OUTPATIENT
Start: 2023-12-13 | End: 2023-12-13

## 2023-12-13 RX ORDER — TRAMADOL HYDROCHLORIDE 50 MG/1
50 TABLET ORAL ONCE
Status: COMPLETED | OUTPATIENT
Start: 2023-12-13 | End: 2023-12-13

## 2023-12-13 RX ADMIN — TRAMADOL HYDROCHLORIDE 50 MG: 50 TABLET ORAL at 18:56

## 2023-12-13 RX ADMIN — IOPAMIDOL 75 ML: 755 INJECTION, SOLUTION INTRAVENOUS at 17:19

## 2023-12-13 RX ADMIN — POTASSIUM BICARBONATE 25 MEQ: 977.5 TABLET, EFFERVESCENT ORAL at 18:56

## 2023-12-13 RX ADMIN — SODIUM CHLORIDE 500 ML: 9 INJECTION, SOLUTION INTRAVENOUS at 16:47

## 2023-12-13 RX ADMIN — Medication 20 MG: at 16:45

## 2023-12-13 RX ADMIN — Medication 12.5 MG: at 16:48

## 2023-12-13 ASSESSMENT — PAIN SCALES - GENERAL
PAINLEVEL_OUTOF10: 9

## 2023-12-13 ASSESSMENT — PAIN - FUNCTIONAL ASSESSMENT: PAIN_FUNCTIONAL_ASSESSMENT: 0-10

## 2023-12-13 ASSESSMENT — PAIN DESCRIPTION - DESCRIPTORS: DESCRIPTORS: SHARP

## 2023-12-13 ASSESSMENT — PAIN DESCRIPTION - ORIENTATION
ORIENTATION: RIGHT
ORIENTATION: RIGHT;UPPER

## 2023-12-13 ASSESSMENT — PAIN DESCRIPTION - LOCATION
LOCATION: ABDOMEN
LOCATION: ABDOMEN

## 2023-12-13 NOTE — DISCHARGE INSTRUCTIONS
Your labs and imaging were reassuring tonight. Call Dr. Ryan Martinez or your primary care doctor for close follow-up and reevaluation.

## 2023-12-13 NOTE — ED PROVIDER NOTES
Wears glasses. EMERGENCY DEPARTMENT COURSE and DIFFERENTIAL DIAGNOSIS/MDM:   Vitals:    Vitals:    12/13/23 1715 12/13/23 1730 12/13/23 1815 12/13/23 1846   BP: (!) 119/107 (!) 114/104 138/80 114/78   Pulse: 95 95 94 81   Resp: 20 19 18 13   Temp:       TempSrc:       SpO2: 99% 100% 100% 100%   Weight:       Height:           Patient was given the following medications:  Medications   sodium chloride 0.9 % bolus 500 mL (0 mLs IntraVENous Stopped 12/13/23 1747)   promethazine (PHENERGAN) 12.5mg in sodium chloride 0.9% 50 mL IVPB SOLN 12.5 mg (0 mg IntraVENous Stopped 12/13/23 1718)   famotidine (PEPCID) 20 mg in sodium chloride (PF) 0.9 % 10 mL injection (20 mg IntraVENous Given 12/13/23 1645)   iopamidol (ISOVUE-370) 76 % injection 75 mL (75 mLs IntraVENous Given 12/13/23 1719)   traMADol (ULTRAM) tablet 50 mg (50 mg Oral Given 12/13/23 1856)   potassium bicarbonate (EFFER-K/K-LYTE) disintegrating tablet 25 mEq (25 mEq Oral Given 12/13/23 1856)             Is this patient to be included in the SEP-1 Core Measure due to severe sepsis or septic shock? No   Exclusion criteria - the patient is NOT to be included for SEP-1 Core Measure due to:   Infection is not suspected    Chronic Conditions affecting care:    has a past medical history of ADHD (attention deficit hyperactivity disorder) (1988), Depression with anxiety, Difficult intubation, Drug-seeking behavior, Encounter for imaging to screen for metal prior to MRI (06/01/2021), ESBL (extended spectrum beta-lactamase) producing bacteria infection (11/06/2019), Functional ovarian cysts (2008), GERD (gastroesophageal reflux disease) (When I was a kid), Headache(784.0), History of blood transfusion, History of kidney stones, History of PCOS, Hydrocephalus ([address]), Hyperlipidemia, Irritable bowel syndrome (2004), Meningitis (81/1796), Neutrophilic leukocytosis, Nicotine dependence, PONV (postoperative nausea and vomiting), Prediabetes, Primary osteoarthritis of left

## 2023-12-15 ENCOUNTER — PATIENT MESSAGE (OUTPATIENT)
Dept: INTERNAL MEDICINE CLINIC | Age: 43
End: 2023-12-15

## 2023-12-15 ENCOUNTER — OFFICE VISIT (OUTPATIENT)
Dept: INTERNAL MEDICINE CLINIC | Age: 43
End: 2023-12-15
Payer: COMMERCIAL

## 2023-12-15 VITALS
OXYGEN SATURATION: 98 % | HEART RATE: 106 BPM | SYSTOLIC BLOOD PRESSURE: 122 MMHG | TEMPERATURE: 98.6 F | BODY MASS INDEX: 34.74 KG/M2 | DIASTOLIC BLOOD PRESSURE: 88 MMHG | WEIGHT: 172 LBS

## 2023-12-15 DIAGNOSIS — M54.16 LUMBAR BACK PAIN WITH RADICULOPATHY AFFECTING LEFT LOWER EXTREMITY: ICD-10-CM

## 2023-12-15 DIAGNOSIS — R10.11 RUQ PAIN: Primary | ICD-10-CM

## 2023-12-15 DIAGNOSIS — R11.2 NAUSEA AND VOMITING, UNSPECIFIED VOMITING TYPE: ICD-10-CM

## 2023-12-15 PROCEDURE — G8427 DOCREV CUR MEDS BY ELIG CLIN: HCPCS | Performed by: NURSE PRACTITIONER

## 2023-12-15 PROCEDURE — 4004F PT TOBACCO SCREEN RCVD TLK: CPT | Performed by: NURSE PRACTITIONER

## 2023-12-15 PROCEDURE — 99214 OFFICE O/P EST MOD 30 MIN: CPT | Performed by: NURSE PRACTITIONER

## 2023-12-15 PROCEDURE — G8484 FLU IMMUNIZE NO ADMIN: HCPCS | Performed by: NURSE PRACTITIONER

## 2023-12-15 PROCEDURE — 3074F SYST BP LT 130 MM HG: CPT | Performed by: NURSE PRACTITIONER

## 2023-12-15 PROCEDURE — 3079F DIAST BP 80-89 MM HG: CPT | Performed by: NURSE PRACTITIONER

## 2023-12-15 PROCEDURE — G8417 CALC BMI ABV UP PARAM F/U: HCPCS | Performed by: NURSE PRACTITIONER

## 2023-12-15 RX ORDER — GABAPENTIN 400 MG/1
400 CAPSULE ORAL 3 TIMES DAILY
Qty: 90 CAPSULE | Refills: 0 | Status: SHIPPED | OUTPATIENT
Start: 2023-12-15 | End: 2024-06-12

## 2023-12-15 NOTE — PROGRESS NOTES
Date: 12/15/2023                                               Subjective/Objective:     Chief Complaint   Patient presents with    GI Problem     Right upper quad pain-  Says that she has a Kidney stone       HPI    Braxton Nam is a 40 yo female, visit today for RUQ pain. This is a chronic complaint, patient has had extensive evaluation for this recently both outpatient and with inpatient hospitalizations. She has seen GI, she has had CT A/P, MRCP, ultrasound, xray, EUS without explanation for her symptoms. GI is considering ERCP if not improving. Patient feels this pain is related to her  shunt, has been unable to find a local neurosurgeon to see her as her prior surgeries were done in [facility]. She was seen by myself via VV 12/1/2023 for diarrhea and abdominal distension. She was having LLQ pain and tenderness as well. She has orders for stool studies, CDIFF that have not been completed. She continues to have RUQ pain, worse with PO intake. She has abdominal distention. She has not lost weight. She continues with nausea and vomiting. She is unsure when she is due to see Dr Yisel Hernandez again. She reports she has been having fevers at home. She has seen general surgery who did not feel she had surgical needs. She was seen in the ER again for the same 12/13/2023 (two days ago). UA negative for UTI, lipase normal. Potassium 3.3. No leukocytosis. She had CT A/P at that time:  IMPRESSION:  No acute intra-abdominal or pelvic abnormality. 3 mm nonobstructing right renal calculus.   Cholecystectomy, no biliary ductal dilatation         Patient Active Problem List    Diagnosis Date Noted    Multiple drug resistant organism (MDRO) culture positive 02/04/2023    Renal stones 02/04/2023    Seizure-like activity ([address]) 10/27/2022    History of extended-spectrum beta-lactamase producing Escherichia coli infection 10/10/2022    H/O laparoscopy 10/10/2022    Allergy to multiple antibiotics 10/10/2022    Abdominal wall

## 2023-12-20 ENCOUNTER — HOSPITAL ENCOUNTER (EMERGENCY)
Age: 43
Discharge: HOME | End: 2023-12-20
Attending: EMERGENCY MEDICINE
Payer: COMMERCIAL

## 2023-12-20 VITALS
TEMPERATURE: 97.4 F | BODY MASS INDEX: 36.04 KG/M2 | DIASTOLIC BLOOD PRESSURE: 65 MMHG | OXYGEN SATURATION: 96 % | WEIGHT: 178.79 LBS | SYSTOLIC BLOOD PRESSURE: 110 MMHG | RESPIRATION RATE: 18 BRPM | HEIGHT: 59 IN | HEART RATE: 84 BPM

## 2023-12-20 DIAGNOSIS — K62.5 RECTAL BLEEDING: Primary | ICD-10-CM

## 2023-12-20 DIAGNOSIS — R10.30 LOWER ABDOMINAL PAIN: ICD-10-CM

## 2023-12-20 DIAGNOSIS — K62.5 RECTAL BLEED: ICD-10-CM

## 2023-12-20 LAB
ALBUMIN SERPL-MCNC: 4.4 G/DL (ref 3.4–5)
ALBUMIN/GLOB SERPL: 1.4 {RATIO} (ref 1.1–2.2)
ALP SERPL-CCNC: 103 U/L (ref 40–129)
ALT SERPL-CCNC: 15 U/L (ref 10–40)
ANION GAP SERPL CALCULATED.3IONS-SCNC: 12 MMOL/L (ref 3–16)
AST SERPL-CCNC: 16 U/L (ref 15–37)
BASOPHILS # BLD: 0.1 K/UL (ref 0–0.2)
BASOPHILS NFR BLD: 0.8 %
BILIRUB SERPL-MCNC: 0.4 MG/DL (ref 0–1)
BUN SERPL-MCNC: 10 MG/DL (ref 7–20)
CALCIUM SERPL-MCNC: 9.4 MG/DL (ref 8.3–10.6)
CHLORIDE SERPL-SCNC: 101 MMOL/L (ref 99–110)
CO2 SERPL-SCNC: 26 MMOL/L (ref 21–32)
CREAT SERPL-MCNC: 0.6 MG/DL (ref 0.6–1.1)
DEPRECATED RDW RBC AUTO: 14.3 % (ref 12.4–15.4)
EOSINOPHIL # BLD: 0.1 K/UL (ref 0–0.6)
EOSINOPHIL NFR BLD: 1.2 %
GFR SERPLBLD CREATININE-BSD FMLA CKD-EPI: >60 ML/MIN/{1.73_M2}
GLUCOSE SERPL-MCNC: 124 MG/DL (ref 70–99)
HCT VFR BLD AUTO: 43.5 % (ref 36–48)
HEMOCCULT STL QL: ABNORMAL
HGB BLD-MCNC: 14.5 G/DL (ref 12–16)
LACTATE BLDV-SCNC: 1.9 MMOL/L (ref 0.4–2)
LYMPHOCYTES # BLD: 2 K/UL (ref 1–5.1)
LYMPHOCYTES NFR BLD: 19.5 %
MCH RBC QN AUTO: 28.9 PG (ref 26–34)
MCHC RBC AUTO-ENTMCNC: 33.4 G/DL (ref 31–36)
MCV RBC AUTO: 86.4 FL (ref 80–100)
MONOCYTES # BLD: 0.5 K/UL (ref 0–1.3)
MONOCYTES NFR BLD: 4.7 %
NEUTROPHILS # BLD: 7.6 K/UL (ref 1.7–7.7)
NEUTROPHILS NFR BLD: 73.8 %
PLATELET # BLD AUTO: 306 K/UL (ref 135–450)
PMV BLD AUTO: 7.9 FL (ref 5–10.5)
POTASSIUM SERPL-SCNC: 4.3 MMOL/L (ref 3.5–5.1)
PROT SERPL-MCNC: 7.6 G/DL (ref 6.4–8.2)
RBC # BLD AUTO: 5.03 M/UL (ref 4–5.2)
SODIUM SERPL-SCNC: 139 MMOL/L (ref 136–145)
WBC # BLD AUTO: 10.3 K/UL (ref 4–11)

## 2023-12-20 PROCEDURE — 7100000001 HC PACU RECOVERY - ADDTL 15 MIN: Performed by: INTERNAL MEDICINE

## 2023-12-20 PROCEDURE — 83605 ASSAY OF LACTIC ACID: CPT

## 2023-12-20 PROCEDURE — 3609010300 HC COLONOSCOPY W/BIOPSY SINGLE/MULTIPLE: Performed by: INTERNAL MEDICINE

## 2023-12-20 PROCEDURE — 7100000010 HC PHASE II RECOVERY - FIRST 15 MIN: Performed by: INTERNAL MEDICINE

## 2023-12-20 PROCEDURE — 85025 COMPLETE CBC W/AUTO DIFF WBC: CPT

## 2023-12-20 PROCEDURE — 6360000002 HC RX W HCPCS

## 2023-12-20 PROCEDURE — 2580000003 HC RX 258

## 2023-12-20 PROCEDURE — 6360000002 HC RX W HCPCS: Performed by: EMERGENCY MEDICINE

## 2023-12-20 PROCEDURE — 99284 EMERGENCY DEPT VISIT MOD MDM: CPT

## 2023-12-20 PROCEDURE — 88305 TISSUE EXAM BY PATHOLOGIST: CPT

## 2023-12-20 PROCEDURE — 3700000001 HC ADD 15 MINUTES (ANESTHESIA): Performed by: INTERNAL MEDICINE

## 2023-12-20 PROCEDURE — 80053 COMPREHEN METABOLIC PANEL: CPT

## 2023-12-20 PROCEDURE — 96365 THER/PROPH/DIAG IV INF INIT: CPT

## 2023-12-20 PROCEDURE — 3700000000 HC ANESTHESIA ATTENDED CARE: Performed by: INTERNAL MEDICINE

## 2023-12-20 PROCEDURE — 2709999900 HC NON-CHARGEABLE SUPPLY: Performed by: INTERNAL MEDICINE

## 2023-12-20 PROCEDURE — 96375 TX/PRO/DX INJ NEW DRUG ADDON: CPT

## 2023-12-20 PROCEDURE — 82270 OCCULT BLOOD FECES: CPT

## 2023-12-20 PROCEDURE — 7100000000 HC PACU RECOVERY - FIRST 15 MIN: Performed by: INTERNAL MEDICINE

## 2023-12-20 PROCEDURE — 7100000011 HC PHASE II RECOVERY - ADDTL 15 MIN: Performed by: INTERNAL MEDICINE

## 2023-12-20 RX ORDER — MORPHINE SULFATE 4 MG/ML
4 INJECTION, SOLUTION INTRAMUSCULAR; INTRAVENOUS ONCE
Status: COMPLETED | OUTPATIENT
Start: 2023-12-20 | End: 2023-12-20

## 2023-12-20 RX ORDER — 0.9 % SODIUM CHLORIDE 0.9 %
1000 INTRAVENOUS SOLUTION INTRAVENOUS ONCE
Status: COMPLETED | OUTPATIENT
Start: 2023-12-20 | End: 2023-12-20

## 2023-12-20 RX ORDER — SODIUM CHLORIDE 0.9 % (FLUSH) 0.9 %
5-40 SYRINGE (ML) INJECTION PRN
Status: DISCONTINUED | OUTPATIENT
Start: 2023-12-20 | End: 2023-12-23 | Stop reason: HOSPADM

## 2023-12-20 RX ORDER — SODIUM CHLORIDE 9 MG/ML
INJECTION, SOLUTION INTRAVENOUS PRN
Status: DISCONTINUED | OUTPATIENT
Start: 2023-12-20 | End: 2023-12-23 | Stop reason: HOSPADM

## 2023-12-20 RX ORDER — SODIUM CHLORIDE 0.9 % (FLUSH) 0.9 %
5-40 SYRINGE (ML) INJECTION EVERY 12 HOURS SCHEDULED
Status: DISCONTINUED | OUTPATIENT
Start: 2023-12-20 | End: 2023-12-23 | Stop reason: HOSPADM

## 2023-12-20 RX ORDER — ONDANSETRON 2 MG/ML
4 INJECTION INTRAMUSCULAR; INTRAVENOUS
Status: DISCONTINUED | OUTPATIENT
Start: 2023-12-20 | End: 2023-12-21 | Stop reason: HOSPADM

## 2023-12-20 RX ADMIN — Medication 25 MG: at 13:06

## 2023-12-20 RX ADMIN — MORPHINE SULFATE 4 MG: 4 INJECTION, SOLUTION INTRAMUSCULAR; INTRAVENOUS at 14:17

## 2023-12-20 RX ADMIN — SODIUM CHLORIDE 1000 ML: 9 INJECTION, SOLUTION INTRAVENOUS at 13:07

## 2023-12-20 NOTE — PROGRESS NOTES
Received from PACU. Admitted to Phase 2 care. Awake and alert, respirations easy and even. Oriented to room and surroundings. Continues with complaints of nausea and lower abdominal pain. Refuses Zofran.

## 2023-12-20 NOTE — PROGRESS NOTES
Pt to pacu from ENDO. Pt arousable to voice, very groggy, falls back to sleep easily, on 2L via nc per CRNA. Placed on monitor, hypotensive. Abdo soft to touch, no pain with palpation. Updated on plan of care. No signs of distress noted at this time. Report obtained.

## 2023-12-20 NOTE — PROGRESS NOTES
Patient states her mother will be home when she gets there. Pt Awake alert and oriented. Steady on feet. Petrona stated  notified patients phone and should be on there way.

## 2023-12-20 NOTE — PROGRESS NOTES
Dr Silvino Mcknight at bedside. Pt states does not know how she is going to get home, states no one is here with her and was planning on taking an uber home. Pt aware will not be able to take an uber alone after anesthesia per hospital policy, states there is no one she can call to give her a ride. Suyapa, preop charge at bedside. Calls placed to  in ER to see about possibly arranging a lyft per ha moreno. Pt appears disgruntled, tearful, snippy with pacu staff. Ripping off BP cuff, and threatening to pull out IV. States \"I'm just going to leave AMA, get this stuff off of me. \" Orvel Corvallis rn at bedside to console patient. Pt now sitting calmly in bed.

## 2023-12-20 NOTE — ED PROVIDER NOTES
ED Attending Attestation Note    This patient was seen by the advanced practice provider. I personally saw the patient and performed a substantive portion of the visit including all aspects of the medical decision making. Briefly, 41 y.o. female presents with abdominal cramping, diarrhea, rectal bleeding, nausea and vomiting. She was scheduled for colonoscopy. She took approximately third of her bowel prep last night and then developed rectal bleeding, abdominal cramping, nausea and vomiting. She attempted to be seen at an outside emergency department last night but left AMA. She returns today with continued symptoms. Denies fever. Focused exam:   Gen: 41 y.o. female, NAD, nontoxic appearing  HEENT: NCAT. MMM, PERRL. EOMI. CV: RRR w/o MRG  Vascular: intact and symmetric radial and DP pulses bilaterally  Lungs: CTAB. No incr WOB. Abdomen: Soft, lower abdominal tenderness, nondistended. No rebound/guarding. Neuro: awake and alert, speech clear w/o aphasia; intact and symmetric strength and sensation in all 4 extremities, CN 2-12 intact bilaterally    MDM:   This a 82-year-old female with history of IBS, hyperlipidemia who presented with nausea, vomiting, diarrhea, rectal bleeding in the context of initiating bowel prep that was incomplete. She follows with GI. They were consulted and evaluated her and will take her to endoscopy from the emergency department. She was having some abdominal discomfort. She requested pain and nausea medicine which was given in the emergency department. For further details of the patient's emergency department visit, please see the advanced practice provider's documentation. Kelly Plunkett MD     This report has been produced using speech recognition software and may contain errors related to that system including errors in grammar, punctuation, and spelling, as well as words and phrases that may be inappropriate.  If there are any questions or

## 2023-12-20 NOTE — ED PROVIDER NOTES
[facility]        Pt Name: Juan Colvin  MRN: [id number]  [facility] 1980  Date of evaluation: 2023  Provider: Altagracia Esposito PA-C  PCP: FREDRICK Meng  Note Started: 11:53 AM EST 23       I have seen and evaluated this patient with my supervising physician Clarence Early. CHIEF COMPLAINT       Chief Complaint   Patient presents with    Rectal Bleeding     Pt here for rectal bleeding. Pt was originally scheduled to have a colonoscopy today but was unable to finish prep r/t n/v.  Pt states rectal bleeding is intermittent. Pt states her blood in stool is dark red. HISTORY OF PRESENT ILLNESS: 1 or more Elements     History From: Patient            Chief Complaint: abdominal pain and rectal bleeding    Juan Colvin is a 41 y.o. female who presents to the ED with a concern of abdominal pain, diarrhea and rectal bleeding. Endorses nausea and vomiting. Patient was scheduled for a colonoscopy today with Dr. Britany Spicer but she was not able to do the prep since she was vomiting and developed the rectal bleeding. Patient was in the ED yesterday due to her lower abdominal pain but left AMA, she then went to [facility], per the patient there were no scans done during this visit. No chest pain, shortness of breath, fever, urinary symptoms. Nursing Notes were all reviewed and agreed with or any disagreements were addressed in the HPI. REVIEW OF SYSTEMS :      Review of Systems    Positives and Pertinent negatives as per HPI.      SURGICAL HISTORY     Past Surgical History:   Procedure Laterality Date    ABDOMEN SURGERY N/A 2021    REMOVAL OF ABDOMINAL WALL MASS performed by Rafaela Horne MD at [address] [place] [address]      appendicitis     SECTION  2005    placenta previa    CHOLECYSTECTOMY      COLONOSCOPY  2004    COLONOSCOPY N/A 04/10/2023    COLONOSCOPY performed by Amairani Strange MD at [address] [place] [address] the SEP-1 Core Measure due to severe sepsis or septic shock? No   Exclusion criteria - the patient is NOT to be included for SEP-1 Core Measure due to: Infection is not suspected        Chronic Conditions affecting care:    has a past medical history of ADHD (attention deficit hyperactivity disorder) (1988), Depression with anxiety, Difficult intubation, Drug-seeking behavior, Encounter for imaging to screen for metal prior to MRI (06/01/2021), ESBL (extended spectrum beta-lactamase) producing bacteria infection (11/06/2019), Functional ovarian cysts (2008), GERD (gastroesophageal reflux disease) (When I was a kid), Headache(784.0), History of blood transfusion, History of kidney stones, History of PCOS, Hydrocephalus ([address]), Hyperlipidemia, Irritable bowel syndrome (2004), Meningitis (33/2766), Neutrophilic leukocytosis, Nicotine dependence, PONV (postoperative nausea and vomiting), Prediabetes, Primary osteoarthritis of left knee (07/01/2016), S/P cone biopsy of cervix (2004), Scoliosis (1990.s), Seizures ([address]),  (ventriculoperitoneal) shunt status (1982), and Wears glasses. CONSULTS: (Who and What was discussed)  IP CONSULT TO GI      Records Reviewed (External and Source)     CC/HPI Summary, DDx, ED Course, and Reassessment:   Patient is a 41year old female presenting to the ED with a complaint of abdominal pain, nausea, vomiting that started last night and rectal bleeding this morning. Patient arrived stable to the ED. Physical as above. Ddx: diverticulosis, ulcerative colitis, mesenteric ischemia, malignancy. ED course: IV fluids, morphine, phenergan. Physical examination showed an alert and oriented female, not acute distress with tenderness to palpation on lower abdomen. Phenergan, iv fluids and morphine were given. CBC showed a stable hemoglobin if 14.5, wBC of 10.3, Normal electrolytes and kidney function, lactic acid of 1.9. Occult blood stool was positive.   A referral to GI was

## 2023-12-20 NOTE — DISCHARGE INSTRUCTIONS
Impression:   1. Large brown formed stool throughout the colon. No melena or blood in the colon. 2.  Mild diverticulosis in the sigmoid colon. 3.  Large internal and external hemorrhoids. 4.  Otherwise normal colon mucosa. Random biopsies obtained. Recommendations:   1. Follow up with Dr. Ruby Farfan for the large internal hemorrhoids which are the source of bleeding. 2.  Planning outpatient gastric emptying study for the nausea. 3.  Has had CT A&P 12/2023, 11/2023, 10/2023, 9/2023, 5/2023 x 2, 4/2023 x 3, 2/2023, and 1/2023. Would hold off on repeat CT scan at this point. 4.  Continue zofran as needed for nausea. Ivy Tavera MD,   Pita Rubio  12/20/2023    Discharge Instructions for Colonoscopy     Colonoscopy is a visual exam of the lining of the large intestine, also called the bowel or colon, with a colonoscope. A colonoscope is a flexible tube with a light and a viewing device. It allows the doctor to view the inside of the colon through a tiny video camera. Colonoscopy is performed for many reasons: unexplained anemia , pain, diarrhea , bloody stools, cancer screening, among many other reasons. Complications from a colonoscopy are rare. Some possible serious complications include perforated bowel (which might require surgery) and bleeding (which could require blood transfusion ). Minor complications include bloating, gas, and cramping that can last for 1-2 days after the procedure. Because air is put into your colon during the procedure, it is normal to pass large amounts of air from your rectum. You may not have a bowel movement for 1-3 days after the procedure. What You Will Need:  Someone to drive you home after the procedure     Steps to Take:  [address] when you get home. Because the sedative will make you drowsy, don't drive, operate machinery, or make important decisions the day of the procedure.   Feelings of bloating, gas, or cramping may persist

## 2023-12-20 NOTE — PROGRESS NOTES
Per pharmacy, there is no phenergan in entire hospital. Dr Carlin Padron aware, States okay to given zofran. See orders. Per patient, Toribio Thornton does not help her at all and does not want it, states \"never mind. I don't want anything. \"

## 2023-12-20 NOTE — CONSULTS
scheduled for colonoscopy today. Rectal bleeding, suspect hemorrhoidal triggered by bowel prep. Prior colonoscopy 4/2023 showed large internal and external hemorrhoids and scattered diverticulosis  Left lower quadrant abdominal pain. CT 12/13 showed no acute intra-abdominal abnormalities. RECOMMENDATIONS:    Will proceed with flexible sigmoidoscopy, possible colonoscopy today with Dr. Zakiya Clemente. Will order 2 tapwater enemas for bowel preparation as she only consumed one third of her oral bowel prep last night. Keep n.p.o. If you have any questions or need any further information, please feel free to contact our consult team.  Thank you for allowing us to participate in the care of Issaabismael. The note was completed using Dragon voice recognition transcription. Every effort was made to ensure accuracy; however, inadvertent transcription errors may be present despite my best efforts to edit errors.       Lesly Montaño PA-C

## 2023-12-20 NOTE — PROGRESS NOTES
Pt states is having 8/10 cramping abdo pain, states pain is the same as the pain that brought her into ER this morning.  falls immediately back to sleep after talking with writer, snoring

## 2023-12-20 NOTE — PROGRESS NOTES
Discharge instructions given, verbal and written given. Verbalize understanding. Up in room. Steady on feet. Patient wanting to go home. Ride being arranged by Charge, RN and ER . Mother at home, but cannot drive. Alert and oriented.

## 2023-12-20 NOTE — OP NOTE
Endoscopy Note    Patient: Ketan De La Rosa  : 1980  MRN: [id number]    Procedure: Colonoscopy with intubation of the terminal ileum  Colonoscopy with biopsy    Pre-op diagnosis: hematochezia, diarrhea, abdominal pain  Post-op diagnosis: Internal hemorrhoids. Diverticulosis. Date:  2023    Surgeon:  Jen Davis MD    Referring Physician:  Peggy Lino    Anesthesia:  TIVA    Indications: This is a 41y.o. year old female who presents today with  hematochezia, diarrhea, nausea, vomiting, abdominal pain. Procedure: An informed consent was obtained from the patient after explanation of indications, benefits, possible risks and complications of the procedure. The patient was then taken to the endoscopy suite, placed in the left lateral decubitus position, and the above IV anesthesia was administered. A digital rectal examination was performed and revealed prolapsing internal hemorrhoids and external hemorrhoids    The Olympus pediatric video colonoscope was placed in the patient's rectum under digital direction and advanced to the cecum. The cecum was identified by characteristic anatomy and ballottment. Appendiceal orifice was under stool and could not be visualized. The prep was poor. The ileocecal valve was identified. The scope was then withdrawn back through the cecum, ascending, transverse, descending and sigmoid colons. Carefull circumferential examination of the mucosa in these areas was performed. The scope was then withdrawn into the rectum and retroflexed. The scope was straightened, the colon was decompressed and the scope was withdrawn from the patient. Findings:  1. Large brown formed stool throughout the colon. No melena or blood in the colon. 2.  Mild diverticulosis in the sigmoid colon. 3.  Large internal and external hemorrhoids. 4.  Otherwise normal colon mucosa. Random biopsies obtained.      The patient tolerated the procedure well and was taken to Recovery in good condition. No complications. EBL: minimal  Specimens taken: yes      Impression:   1. Large brown formed stool throughout the colon. No melena or blood in the colon. 2.  Mild diverticulosis in the sigmoid colon. 3.  Large internal and external hemorrhoids. 4.  Otherwise normal colon mucosa. Random biopsies obtained. Recommendations:   1. Follow up with Dr. Indio Sandoval for the large internal hemorrhoids which are the source of bleeding. 2.  Planning outpatient gastric emptying study for the nausea. 3.  Has had CT A&P 12/2023, 11/2023, 10/2023, 9/2023, 5/2023 x 2, 4/2023 x 3, 2/2023, and 1/2023. Would hold off on repeat CT scan at this point. 4.  Continue zofran as needed for nausea.       Luis Bahena MD,   Burr Sandhoff  12/20/2023

## 2023-12-20 NOTE — PROGRESS NOTES
Contacted  in ER. Patient doesn't have a ride and has no one to call for a ride. Patient had anesthesia. Spoke with Petrona regarding a ride home. Dr. Radha Louie said he would talk to  a hospitalist if needed to be admitted. He had no reason though to admit her.

## 2023-12-20 NOTE — PROGRESS NOTES
Patient very upset that [facility] was coming to pick her up. Refused to go with them if other people were in the [facility]. They did arrive. Patient assisted into [facility] and discharged.

## 2023-12-20 NOTE — CARE COORDINATION
ITZEL Consult for transportation Home:  SW called patient's [facility]  Transport # and set up medical transport home/ medial sedan  Patient to have door to door service from [facility].  Picked up at 5:50pm.

## 2024-01-03 ENCOUNTER — CARE COORDINATION (OUTPATIENT)
Dept: CARE COORDINATION | Age: 44
End: 2024-01-03

## 2024-01-03 NOTE — CARE COORDINATION
Call to patient, LM on  with this ACM name and number to please call    Future Appointments   Date Time Provider Department Center   1/11/2024  8:00 AM [facility]   1/11/2024  9:00 AM [facility]   1/11/2024 10:00 AM [facility]   1/11/2024 11:00 AM [facility]   1/11/2024 12:00 PM [facility]     Current Outpatient Medications   Medication Sig Dispense Refill    gabapentin (NEURONTIN) 400 MG capsule Take 1 capsule by mouth 3 times daily for 180 days. Intended supply: 30 days 90 capsule 0    ondansetron (ZOFRAN-ODT) 4 MG disintegrating tablet Take 1 tablet by mouth 3 times daily as needed for Nausea or Vomiting 21 tablet 0    blood glucose monitor strips Test two times a day & as needed for symptoms of irregular blood glucose. Dispense sufficient amount for indicated testing frequency plus additional to accommodate PRN testing needs. 100 strip 1    Lancets MISC 1 each by Does not apply route daily 100 each 1    glucose monitoring kit 1 kit by Does not apply route daily 1 kit 0    Alcohol Swabs (ALCOHOL PREP) PADS 1 each by Does not apply route in the morning and at bedtime 100 each 1    atorvastatin (LIPITOR) 10 MG tablet Take 1 tablet by mouth at bedtime 30 tablet 1    lamoTRIgine (LAMICTAL) 25 MG tablet Take 4 tablets by mouth at bedtime       No current facility-administered medications for this visit.      
No

## 2024-01-11 ENCOUNTER — HOSPITAL ENCOUNTER (OUTPATIENT)
Dept: NUCLEAR MEDICINE | Age: 44
Discharge: HOME | End: 2024-01-11
Attending: INTERNAL MEDICINE
Payer: COMMERCIAL

## 2024-01-11 ENCOUNTER — CARE COORDINATION (OUTPATIENT)
Dept: CARE COORDINATION | Age: 44
End: 2024-01-11

## 2024-01-11 ENCOUNTER — HOSPITAL ENCOUNTER (EMERGENCY)
Age: 44
Discharge: LEFT BEFORE DISCHARGE | End: 2024-01-11
Payer: COMMERCIAL

## 2024-01-11 VITALS
TEMPERATURE: 98.4 F | HEART RATE: 95 BPM | HEIGHT: 59 IN | BODY MASS INDEX: 35.24 KG/M2 | DIASTOLIC BLOOD PRESSURE: 97 MMHG | OXYGEN SATURATION: 97 % | WEIGHT: 174.82 LBS | RESPIRATION RATE: 16 BRPM | SYSTOLIC BLOOD PRESSURE: 137 MMHG

## 2024-01-11 DIAGNOSIS — R10.84 GENERALIZED ABDOMINAL PAIN: Primary | ICD-10-CM

## 2024-01-11 DIAGNOSIS — R11.2 NAUSEA AND VOMITING, UNSPECIFIED VOMITING TYPE: ICD-10-CM

## 2024-01-11 LAB
ALBUMIN SERPL-MCNC: 4.4 G/DL (ref 3.4–5)
ALBUMIN/GLOB SERPL: 1.8 {RATIO} (ref 1.1–2.2)
ALP SERPL-CCNC: 106 U/L (ref 40–129)
ALT SERPL-CCNC: 16 U/L (ref 10–40)
ANION GAP SERPL CALCULATED.3IONS-SCNC: 14 MMOL/L (ref 3–16)
AST SERPL-CCNC: 12 U/L (ref 15–37)
BASOPHILS # BLD: 0.1 K/UL (ref 0–0.2)
BASOPHILS NFR BLD: 0.8 %
BILIRUB SERPL-MCNC: 0.3 MG/DL (ref 0–1)
BILIRUB UR QL STRIP.AUTO: NEGATIVE
BUN SERPL-MCNC: 10 MG/DL (ref 7–20)
CALCIUM SERPL-MCNC: 9.6 MG/DL (ref 8.3–10.6)
CHLORIDE SERPL-SCNC: 101 MMOL/L (ref 99–110)
CLARITY UR: CLEAR
CO2 SERPL-SCNC: 24 MMOL/L (ref 21–32)
COLOR UR: YELLOW
CREAT SERPL-MCNC: 0.7 MG/DL (ref 0.6–1.1)
DEPRECATED RDW RBC AUTO: 13.8 % (ref 12.4–15.4)
EOSINOPHIL # BLD: 0.2 K/UL (ref 0–0.6)
EOSINOPHIL NFR BLD: 1.4 %
GFR SERPLBLD CREATININE-BSD FMLA CKD-EPI: >60 ML/MIN/{1.73_M2}
GLUCOSE SERPL-MCNC: 117 MG/DL (ref 70–99)
GLUCOSE UR STRIP.AUTO-MCNC: NEGATIVE MG/DL
HCT VFR BLD AUTO: 42.3 % (ref 36–48)
HGB BLD-MCNC: 14.6 G/DL (ref 12–16)
HGB UR QL STRIP.AUTO: NEGATIVE
KETONES UR STRIP.AUTO-MCNC: NEGATIVE MG/DL
LEUKOCYTE ESTERASE UR QL STRIP.AUTO: NEGATIVE
LIPASE SERPL-CCNC: 15 U/L (ref 13–60)
LYMPHOCYTES # BLD: 2.3 K/UL (ref 1–5.1)
LYMPHOCYTES NFR BLD: 20.3 %
MCH RBC QN AUTO: 29.4 PG (ref 26–34)
MCHC RBC AUTO-ENTMCNC: 34.4 G/DL (ref 31–36)
MCV RBC AUTO: 85.3 FL (ref 80–100)
MONOCYTES # BLD: 0.6 K/UL (ref 0–1.3)
MONOCYTES NFR BLD: 5 %
NEUTROPHILS # BLD: 8.1 K/UL (ref 1.7–7.7)
NEUTROPHILS NFR BLD: 72.5 %
NITRITE UR QL STRIP.AUTO: NEGATIVE
PH UR STRIP.AUTO: 6 [PH] (ref 5–8)
PLATELET # BLD AUTO: 253 K/UL (ref 135–450)
PMV BLD AUTO: 7.9 FL (ref 5–10.5)
POTASSIUM SERPL-SCNC: 4 MMOL/L (ref 3.5–5.1)
PROT SERPL-MCNC: 6.9 G/DL (ref 6.4–8.2)
PROT UR STRIP.AUTO-MCNC: NEGATIVE MG/DL
RBC # BLD AUTO: 4.96 M/UL (ref 4–5.2)
SODIUM SERPL-SCNC: 139 MMOL/L (ref 136–145)
SP GR UR STRIP.AUTO: 1.01 (ref 1–1.03)
UA COMPLETE W REFLEX CULTURE PNL UR: NORMAL
UA DIPSTICK W REFLEX MICRO PNL UR: NORMAL
URN SPEC COLLECT METH UR: NORMAL
UROBILINOGEN UR STRIP-ACNC: 0.2 E.U./DL
WBC # BLD AUTO: 11.2 K/UL (ref 4–11)

## 2024-01-11 PROCEDURE — A9541 TC99M SULFUR COLLOID: HCPCS | Performed by: INTERNAL MEDICINE

## 2024-01-11 PROCEDURE — 81003 URINALYSIS AUTO W/O SCOPE: CPT

## 2024-01-11 PROCEDURE — 85025 COMPLETE CBC W/AUTO DIFF WBC: CPT

## 2024-01-11 PROCEDURE — 83690 ASSAY OF LIPASE: CPT

## 2024-01-11 PROCEDURE — 78264 GASTRIC EMPTYING IMG STUDY: CPT

## 2024-01-11 PROCEDURE — 3430000000 HC RX DIAGNOSTIC RADIOPHARMACEUTICAL: Performed by: INTERNAL MEDICINE

## 2024-01-11 PROCEDURE — 4500000002 HC ER NO CHARGE

## 2024-01-11 PROCEDURE — 80053 COMPREHEN METABOLIC PANEL: CPT

## 2024-01-11 RX ORDER — PROMETHAZINE HYDROCHLORIDE 25 MG/1
25 TABLET ORAL ONCE
Status: DISCONTINUED | OUTPATIENT
Start: 2024-01-11 | End: 2024-01-11 | Stop reason: HOSPADM

## 2024-01-11 RX ADMIN — Medication 1 MILLICURIE: at 08:22

## 2024-01-11 ASSESSMENT — PAIN - FUNCTIONAL ASSESSMENT: PAIN_FUNCTIONAL_ASSESSMENT: 0-10

## 2024-01-11 ASSESSMENT — PAIN DESCRIPTION - PAIN TYPE: TYPE: ACUTE PAIN

## 2024-01-11 ASSESSMENT — PAIN SCALES - GENERAL: PAINLEVEL_OUTOF10: 8

## 2024-01-11 ASSESSMENT — PAIN DESCRIPTION - LOCATION: LOCATION: ABDOMEN

## 2024-01-11 NOTE — CARE COORDINATION
Ambulatory Care Coordination Note  2024    Patient Current Location:  [place]     ACM contacted the  by patient while she was in the ER   by telephone. Verified name and  with patient as identifiers. Provided introduction to self, and explanation of the ACM role.     Challenges to be reviewed by the provider   Additional needs identified to be addressed with provider: Yes  Pt had gastric study today and vomited there  Reports she was told to go to ER for fluids  Pt currently at ER, no IV has been started and was offered oral phenergan   Pt reports she was able to take anything orally and she continues to have n/v    Pt decided to leave the ER due to not receiving IV fluids   Labs completed while there               Method of communication with provider: chart routing.    ACM: Evonne Jeter, RN  Pt called this ACM from ER in tears due to having vomiting at GI study today, and being told to go to ER for fluids  Now at ER, no IV fluids have been administered, instead oral medication offered for nausea which pt reports she is unable to keep down.  Pt has call out to Dr Manuel     PLAN   Pt has call out to GI - DR Manuel because pt states that he was the one that told her to go to ER for fluids if still unable to keep things down..      Offered patient enrollment in the Remote Patient Monitoring (RPM) program for in-home monitoring: Patient is not eligible for RPM program.    Lab Results       None            Care Coordination Interventions    Referral from Primary Care Provider: No  Suggested Interventions and Community Resources          Goals Addressed    None         Future Appointments   Date Time Provider Department Center   2024 12:00 PM [place] [facility] 3 [facility]

## 2024-01-11 NOTE — ED NOTES
RN offered pt medications per MAR and pt crying \"I told her I cannot swallow anything. I am leaving.\" EDPA aware

## 2024-01-11 NOTE — ED PROVIDER NOTES
[facility] EMERGENCY DEPARTMENT  EMERGENCY DEPARTMENT ENCOUNTER        Pt Name: Petrona Green  MRN: [id number]  Birthdate 1980  Date of evaluation: 1/11/2024  Provider: Mary Carmen Fierro PA-C  PCP: Linda Fox APRN  Note Started: 9:57 AM EST 1/11/24      ALDAIR. I have evaluated this patient.        CHIEF COMPLAINT       Chief Complaint   Patient presents with    Abdominal Pain     Abd pain onset 2 months ago with vomiting and diarrhea.  Pt came from gastric emptying study.  Pt vomited there.         HISTORY OF PRESENT ILLNESS: 1 or more Elements     History From: Patient    Limitations to history : None    Social Determinants Significantly Affecting Health : None    Chief Complaint: Abdominal pain    Petrona Green is a 43 y.o. female who presents to the ED with a concern of abdominal pain that started 2 months ago, she describes the pain as sharp after eating.  She denies new or worsening symptoms.  She endorses vomiting, diarrhea (no blood noted), nausea, chills.  Denies chest pain, shortness of breath, urinary symptoms, fever, leg swelling.  Patient was scheduled to have a gastric emptying today, after she was given the hide she started vomiting and she was instructed that the procedure was negative be performed today and she was instructed to come to the ED.  Patient was recently seen on 12/21/2023 due to the same concern, she had a colonoscopy which showed no abnormalities.  Patient follows with Dr. Manuel for chronic abdominal pain.  Patient had a cholecystectomy.    Nursing Notes were all reviewed and agreed with or any disagreements were addressed in the HPI.    REVIEW OF SYSTEMS :      Review of Systems    Positives and Pertinent negatives as per HPI.     SURGICAL HISTORY     Past Surgical History:   Procedure Laterality Date    ABDOMEN SURGERY N/A 07/14/2021    REMOVAL OF ABDOMINAL WALL MASS performed by Bo Marshall MD at [facility] OR    APPENDECTOMY  2003

## 2024-01-11 NOTE — ED NOTES
RN asks pt if she can provide urine sample. On the way to the BR pt whispers, \"I don't even know why I am here. I have been unable to keep anything down for 2 months. What's new?\" Pt advised we will do urine and bloodwork to assess her status.

## 2024-01-11 NOTE — ED NOTES
Went into patient room to get blood via straight stick. Patient became upset that she was not getting a room nor IV fluids. As I was getting the blood patient state \" You can go head and get this blood but I am going go head and leave and get my results via mychart and tell your doctor that.\"   I than notified provider and JOHNIE Lead and Charge nurse.  After blood was sent to lab. I notified patient that she will be sitting in the lobby until we get lab results back.   Patient became very upset and stated \"This is the worse hospital ever. No wonder why people die here. I'm leaving. \" And stormed out the door crying.,

## 2024-01-11 NOTE — ED TRIAGE NOTES
Pt into ER from gastric study with c/c Abd pain onset 2 months ago with vomiting and diarrhea.  Pt came from gastric emptying study.  Pt vomited there and was told to come to the ER, per pt.

## 2024-01-16 DIAGNOSIS — E78.2 MIXED HYPERLIPIDEMIA: ICD-10-CM

## 2024-01-16 DIAGNOSIS — M54.16 LUMBAR BACK PAIN WITH RADICULOPATHY AFFECTING LEFT LOWER EXTREMITY: ICD-10-CM

## 2024-01-16 RX ORDER — ATORVASTATIN CALCIUM 10 MG/1
10 TABLET, FILM COATED ORAL NIGHTLY
Qty: 30 TABLET | Refills: 1 | Status: SHIPPED | OUTPATIENT
Start: 2024-01-16

## 2024-01-16 RX ORDER — GABAPENTIN 400 MG/1
400 CAPSULE ORAL 3 TIMES DAILY
Qty: 90 CAPSULE | Refills: 0 | Status: SHIPPED | OUTPATIENT
Start: 2024-01-16 | End: 2024-07-14

## 2024-01-16 NOTE — TELEPHONE ENCOUNTER
Pt is requesting a refill for her:    atorvastatin (LIPITOR) 10 MG tablet     gabapentin (NEURONTIN) 400 MG capsule       Please send to [facility] on [address] in [place]

## 2024-01-18 ENCOUNTER — CARE COORDINATION (OUTPATIENT)
Dept: CARE COORDINATION | Age: 44
End: 2024-01-18

## 2024-02-06 ENCOUNTER — ANESTHESIA EVENT (OUTPATIENT)
Dept: ENDOSCOPY | Age: 44
End: 2024-02-06
Payer: COMMERCIAL

## 2024-02-06 NOTE — PROGRESS NOTES
[facility] Pre-Admission Testing Electronic Communication Worksheet for OR/ENDO Procedures        Patient: Petrona Green    DOS:  2/7    Arrival Time: 11    Surgery Time:1230    Meds to Bed:  [x] YES    []  NO    Transportation Confirmed: [x] YES    []  NO    History and Physical:  [] YES    []  NO  [x] N/A  If yes, please list doctor or Urgent Care and date of H&P:     Additional Clearance(Cardiac, Pulmonary, etc):  [] YES    [x]  NO    Pre-Admission Testing Visit:  [] YES    []  NO If no, do labs/testing need to be done DOS?  [] YES    [x]  NO    Medication Reconciliation Complete:  [x] YES    []  NO        Additional Notes:                Interview Complete: [x] YES    []  NO          Heidy Downey RN  10:24 AM   Mei from [facility] called to report that she was informed patient will need a new standing order sent to [facility] (fax: [phone])

## 2024-02-06 NOTE — PROGRESS NOTES
[facility] PRE-OPERATIVE INSTRUCTIONS    Day of Procedure:   2/7             Arrival time:     11           Surgery time:1230    Take the following medications with a sip of water:  Follow your MD/Surgeons pre-procedure instructions regarding your medications     Do not eat or drink anything after 12:00 midnight prior to your surgery.  This includes water chewing gum, mints and ice chips.   You may brush your teeth and gargle the morning of your surgery, but do not swallow the water     Please see your family doctor/pediatrician for a history and physical and/or concerning medications.   Bring any test results/reports from your physicians office.   If you are under the care of a heart doctor or specialist doctor, please be aware that you may be asked to them for clearance    You may be asked to stop blood thinners such as Coumadin, Plavix, Fragmin, Lovenox, etc., or any anti-inflammatories such as:  Aspirin, Ibuprofen, Advil, Naproxen prior to your surgery.    We also ask that you stop any OTC medications such as fish oil, vitamin E, glucosamine, garlic, Multivitamins, COQ 10, etc.    We ask that you do not smoke 24 hours prior to surgery  We ask that you do not  drink any alcoholic beverages 24 hours prior to surgery     You must make arrangements for a responsible adult to take you home after your surgery.    For your safety you will not be allowed to leave alone or drive yourself home.  Your surgery will be cancelled if you do not have a ride home.     Also for your safety, it is strongly suggested that someone stay with you the first 24 hours after your surgery.     A parent or legal guardian must accompany a child scheduled for surgery and plan to stay at the hospital until the child is discharged.    Please do not bring other children with you.    For your comfort, please wear simple loose fitting clothing to the hospital.  Please do not bring valuables.    Do not wear any make-up or nail polish

## 2024-02-07 ENCOUNTER — APPOINTMENT (OUTPATIENT)
Dept: GENERAL RADIOLOGY | Age: 44
End: 2024-02-07
Attending: INTERNAL MEDICINE
Payer: COMMERCIAL

## 2024-02-07 ENCOUNTER — APPOINTMENT (OUTPATIENT)
Dept: CT IMAGING | Age: 44
End: 2024-02-07
Attending: INTERNAL MEDICINE
Payer: COMMERCIAL

## 2024-02-07 ENCOUNTER — ANESTHESIA (OUTPATIENT)
Dept: ENDOSCOPY | Age: 44
End: 2024-02-07
Payer: COMMERCIAL

## 2024-02-07 ENCOUNTER — HOSPITAL ENCOUNTER (OUTPATIENT)
Age: 44
Setting detail: OUTPATIENT SURGERY
Discharge: HOME | End: 2024-02-07
Attending: INTERNAL MEDICINE | Admitting: INTERNAL MEDICINE
Payer: COMMERCIAL

## 2024-02-07 VITALS
RESPIRATION RATE: 16 BRPM | DIASTOLIC BLOOD PRESSURE: 65 MMHG | BODY MASS INDEX: 36.08 KG/M2 | TEMPERATURE: 97 F | SYSTOLIC BLOOD PRESSURE: 108 MMHG | HEART RATE: 90 BPM | WEIGHT: 179 LBS | OXYGEN SATURATION: 93 % | HEIGHT: 59 IN

## 2024-02-07 DIAGNOSIS — R52 PAIN: Primary | ICD-10-CM

## 2024-02-07 LAB
ALBUMIN SERPL-MCNC: 4.3 G/DL (ref 3.4–5)
ALBUMIN/GLOB SERPL: 1.6 {RATIO} (ref 1.1–2.2)
ALP SERPL-CCNC: 141 U/L (ref 40–129)
ALT SERPL-CCNC: 44 U/L (ref 10–40)
ANION GAP SERPL CALCULATED.3IONS-SCNC: 14 MMOL/L (ref 3–16)
AST SERPL-CCNC: 17 U/L (ref 15–37)
BASOPHILS # BLD: 0.1 K/UL (ref 0–0.2)
BASOPHILS NFR BLD: 0.5 %
BILIRUB SERPL-MCNC: <0.2 MG/DL (ref 0–1)
BUN SERPL-MCNC: 9 MG/DL (ref 7–20)
CALCIUM SERPL-MCNC: 8.7 MG/DL (ref 8.3–10.6)
CHLORIDE SERPL-SCNC: 107 MMOL/L (ref 99–110)
CO2 SERPL-SCNC: 20 MMOL/L (ref 21–32)
CREAT SERPL-MCNC: 0.6 MG/DL (ref 0.6–1.1)
DEPRECATED RDW RBC AUTO: 14.1 % (ref 12.4–15.4)
EOSINOPHIL # BLD: 0.1 K/UL (ref 0–0.6)
EOSINOPHIL NFR BLD: 0.7 %
GFR SERPLBLD CREATININE-BSD FMLA CKD-EPI: >60 ML/MIN/{1.73_M2}
GLUCOSE SERPL-MCNC: 115 MG/DL (ref 70–99)
HCT VFR BLD AUTO: 45 % (ref 36–48)
HGB BLD-MCNC: 15.1 G/DL (ref 12–16)
LIPASE SERPL-CCNC: 23 U/L (ref 13–60)
LYMPHOCYTES # BLD: 1.3 K/UL (ref 1–5.1)
LYMPHOCYTES NFR BLD: 11.9 %
MCH RBC QN AUTO: 28.7 PG (ref 26–34)
MCHC RBC AUTO-ENTMCNC: 33.4 G/DL (ref 31–36)
MCV RBC AUTO: 85.8 FL (ref 80–100)
MONOCYTES # BLD: 0.2 K/UL (ref 0–1.3)
MONOCYTES NFR BLD: 2.2 %
NEUTROPHILS # BLD: 9.3 K/UL (ref 1.7–7.7)
NEUTROPHILS NFR BLD: 84.7 %
PLATELET # BLD AUTO: 243 K/UL (ref 135–450)
PMV BLD AUTO: 7.7 FL (ref 5–10.5)
POTASSIUM SERPL-SCNC: 4 MMOL/L (ref 3.5–5.1)
PROT SERPL-MCNC: 7 G/DL (ref 6.4–8.2)
RBC # BLD AUTO: 5.25 M/UL (ref 4–5.2)
SODIUM SERPL-SCNC: 141 MMOL/L (ref 136–145)
WBC # BLD AUTO: 11 K/UL (ref 4–11)

## 2024-02-07 PROCEDURE — 6360000002 HC RX W HCPCS: Performed by: STUDENT IN AN ORGANIZED HEALTH CARE EDUCATION/TRAINING PROGRAM

## 2024-02-07 PROCEDURE — 74330 X-RAY BILE/PANC ENDOSCOPY: CPT

## 2024-02-07 PROCEDURE — 83690 ASSAY OF LIPASE: CPT

## 2024-02-07 PROCEDURE — 2580000003 HC RX 258: Performed by: ANESTHESIOLOGY

## 2024-02-07 PROCEDURE — 3609014900 HC ERCP W/SPHINCTEROTOMY &/OR PAPILLOTOMY: Performed by: INTERNAL MEDICINE

## 2024-02-07 PROCEDURE — 3609015200 HC ERCP REMOVE CALCULI/DEBRIS BILIARY/PANCREAS DUCT: Performed by: INTERNAL MEDICINE

## 2024-02-07 PROCEDURE — 7100000011 HC PHASE II RECOVERY - ADDTL 15 MIN: Performed by: INTERNAL MEDICINE

## 2024-02-07 PROCEDURE — 7100000000 HC PACU RECOVERY - FIRST 15 MIN: Performed by: INTERNAL MEDICINE

## 2024-02-07 PROCEDURE — 80053 COMPREHEN METABOLIC PANEL: CPT

## 2024-02-07 PROCEDURE — 6360000004 HC RX CONTRAST MEDICATION: Performed by: INTERNAL MEDICINE

## 2024-02-07 PROCEDURE — 36415 COLL VENOUS BLD VENIPUNCTURE: CPT

## 2024-02-07 PROCEDURE — 2720000010 HC SURG SUPPLY STERILE: Performed by: INTERNAL MEDICINE

## 2024-02-07 PROCEDURE — 6360000002 HC RX W HCPCS: Performed by: NURSE ANESTHETIST, CERTIFIED REGISTERED

## 2024-02-07 PROCEDURE — 74160 CT ABDOMEN W/CONTRAST: CPT

## 2024-02-07 PROCEDURE — 7100000010 HC PHASE II RECOVERY - FIRST 15 MIN: Performed by: INTERNAL MEDICINE

## 2024-02-07 PROCEDURE — 3700000001 HC ADD 15 MINUTES (ANESTHESIA): Performed by: INTERNAL MEDICINE

## 2024-02-07 PROCEDURE — 2709999900 HC NON-CHARGEABLE SUPPLY: Performed by: INTERNAL MEDICINE

## 2024-02-07 PROCEDURE — C1769 GUIDE WIRE: HCPCS | Performed by: INTERNAL MEDICINE

## 2024-02-07 PROCEDURE — 3700000000 HC ANESTHESIA ATTENDED CARE: Performed by: INTERNAL MEDICINE

## 2024-02-07 PROCEDURE — 2500000003 HC RX 250 WO HCPCS: Performed by: NURSE ANESTHETIST, CERTIFIED REGISTERED

## 2024-02-07 PROCEDURE — 85025 COMPLETE CBC W/AUTO DIFF WBC: CPT

## 2024-02-07 PROCEDURE — 6370000000 HC RX 637 (ALT 250 FOR IP): Performed by: STUDENT IN AN ORGANIZED HEALTH CARE EDUCATION/TRAINING PROGRAM

## 2024-02-07 PROCEDURE — 7100000001 HC PACU RECOVERY - ADDTL 15 MIN: Performed by: INTERNAL MEDICINE

## 2024-02-07 RX ORDER — SODIUM CHLORIDE 9 MG/ML
INJECTION, SOLUTION INTRAVENOUS PRN
Status: DISCONTINUED | OUTPATIENT
Start: 2024-02-07 | End: 2024-02-07 | Stop reason: HOSPADM

## 2024-02-07 RX ORDER — OXYCODONE HYDROCHLORIDE 5 MG/1
5 TABLET ORAL
Status: COMPLETED | OUTPATIENT
Start: 2024-02-07 | End: 2024-02-07

## 2024-02-07 RX ORDER — ONDANSETRON 2 MG/ML
4 INJECTION INTRAMUSCULAR; INTRAVENOUS
Status: COMPLETED | OUTPATIENT
Start: 2024-02-07 | End: 2024-02-07

## 2024-02-07 RX ORDER — LORAZEPAM 2 MG/ML
1 INJECTION INTRAMUSCULAR
Status: COMPLETED | OUTPATIENT
Start: 2024-02-07 | End: 2024-02-07

## 2024-02-07 RX ORDER — SODIUM CHLORIDE 0.9 % (FLUSH) 0.9 %
5-40 SYRINGE (ML) INJECTION EVERY 12 HOURS SCHEDULED
Status: DISCONTINUED | OUTPATIENT
Start: 2024-02-07 | End: 2024-02-07 | Stop reason: HOSPADM

## 2024-02-07 RX ORDER — FENTANYL CITRATE 50 UG/ML
INJECTION, SOLUTION INTRAMUSCULAR; INTRAVENOUS PRN
Status: DISCONTINUED | OUTPATIENT
Start: 2024-02-07 | End: 2024-02-07 | Stop reason: SDUPTHER

## 2024-02-07 RX ORDER — DIPHENHYDRAMINE HYDROCHLORIDE 50 MG/ML
12.5 INJECTION INTRAMUSCULAR; INTRAVENOUS
Status: DISCONTINUED | OUTPATIENT
Start: 2024-02-07 | End: 2024-02-07 | Stop reason: HOSPADM

## 2024-02-07 RX ORDER — IPRATROPIUM BROMIDE AND ALBUTEROL SULFATE 2.5; .5 MG/3ML; MG/3ML
1 SOLUTION RESPIRATORY (INHALATION)
Status: DISCONTINUED | OUTPATIENT
Start: 2024-02-07 | End: 2024-02-07 | Stop reason: HOSPADM

## 2024-02-07 RX ORDER — SODIUM CHLORIDE 0.9 % (FLUSH) 0.9 %
5-40 SYRINGE (ML) INJECTION PRN
Status: DISCONTINUED | OUTPATIENT
Start: 2024-02-07 | End: 2024-02-07 | Stop reason: HOSPADM

## 2024-02-07 RX ORDER — PROPOFOL 10 MG/ML
INJECTION, EMULSION INTRAVENOUS PRN
Status: DISCONTINUED | OUTPATIENT
Start: 2024-02-07 | End: 2024-02-07 | Stop reason: SDUPTHER

## 2024-02-07 RX ORDER — MIDAZOLAM HYDROCHLORIDE 1 MG/ML
INJECTION INTRAMUSCULAR; INTRAVENOUS PRN
Status: DISCONTINUED | OUTPATIENT
Start: 2024-02-07 | End: 2024-02-07 | Stop reason: SDUPTHER

## 2024-02-07 RX ORDER — FENTANYL CITRATE 0.05 MG/ML
50 INJECTION, SOLUTION INTRAMUSCULAR; INTRAVENOUS EVERY 5 MIN PRN
Status: DISCONTINUED | OUTPATIENT
Start: 2024-02-07 | End: 2024-02-07 | Stop reason: HOSPADM

## 2024-02-07 RX ORDER — ONDANSETRON 2 MG/ML
INJECTION INTRAMUSCULAR; INTRAVENOUS PRN
Status: DISCONTINUED | OUTPATIENT
Start: 2024-02-07 | End: 2024-02-07 | Stop reason: SDUPTHER

## 2024-02-07 RX ORDER — LIDOCAINE HYDROCHLORIDE 20 MG/ML
INJECTION, SOLUTION EPIDURAL; INFILTRATION; INTRACAUDAL; PERINEURAL PRN
Status: DISCONTINUED | OUTPATIENT
Start: 2024-02-07 | End: 2024-02-07 | Stop reason: SDUPTHER

## 2024-02-07 RX ORDER — LABETALOL HYDROCHLORIDE 5 MG/ML
10 INJECTION, SOLUTION INTRAVENOUS
Status: DISCONTINUED | OUTPATIENT
Start: 2024-02-07 | End: 2024-02-07 | Stop reason: HOSPADM

## 2024-02-07 RX ORDER — PROMETHAZINE HYDROCHLORIDE 25 MG/1
25 TABLET ORAL EVERY 6 HOURS PRN
COMMUNITY

## 2024-02-07 RX ORDER — HYDRALAZINE HYDROCHLORIDE 20 MG/ML
10 INJECTION INTRAMUSCULAR; INTRAVENOUS
Status: DISCONTINUED | OUTPATIENT
Start: 2024-02-07 | End: 2024-02-07 | Stop reason: HOSPADM

## 2024-02-07 RX ORDER — SUCCINYLCHOLINE/SOD CL,ISO/PF 200MG/10ML
SYRINGE (ML) INTRAVENOUS PRN
Status: DISCONTINUED | OUTPATIENT
Start: 2024-02-07 | End: 2024-02-07 | Stop reason: SDUPTHER

## 2024-02-07 RX ORDER — ROCURONIUM BROMIDE 10 MG/ML
INJECTION, SOLUTION INTRAVENOUS PRN
Status: DISCONTINUED | OUTPATIENT
Start: 2024-02-07 | End: 2024-02-07 | Stop reason: SDUPTHER

## 2024-02-07 RX ORDER — FENTANYL CITRATE 0.05 MG/ML
25 INJECTION, SOLUTION INTRAMUSCULAR; INTRAVENOUS EVERY 5 MIN PRN
Status: DISCONTINUED | OUTPATIENT
Start: 2024-02-07 | End: 2024-02-07 | Stop reason: HOSPADM

## 2024-02-07 RX ORDER — DEXAMETHASONE SODIUM PHOSPHATE 4 MG/ML
INJECTION, SOLUTION INTRA-ARTICULAR; INTRALESIONAL; INTRAMUSCULAR; INTRAVENOUS; SOFT TISSUE PRN
Status: DISCONTINUED | OUTPATIENT
Start: 2024-02-07 | End: 2024-02-07 | Stop reason: SDUPTHER

## 2024-02-07 RX ADMIN — IOPAMIDOL 75 ML: 755 INJECTION, SOLUTION INTRAVENOUS at 14:33

## 2024-02-07 RX ADMIN — FENTANYL CITRATE 50 MCG: 50 INJECTION INTRAMUSCULAR; INTRAVENOUS at 12:24

## 2024-02-07 RX ADMIN — Medication 140 MG: at 12:24

## 2024-02-07 RX ADMIN — ROCURONIUM BROMIDE 10 MG: 10 INJECTION INTRAVENOUS at 12:24

## 2024-02-07 RX ADMIN — FENTANYL CITRATE 50 MCG: 0.05 INJECTION, SOLUTION INTRAMUSCULAR; INTRAVENOUS at 13:43

## 2024-02-07 RX ADMIN — SODIUM CHLORIDE: 9 INJECTION, SOLUTION INTRAVENOUS at 12:00

## 2024-02-07 RX ADMIN — PROPOFOL 200 MG: 10 INJECTION, EMULSION INTRAVENOUS at 12:24

## 2024-02-07 RX ADMIN — FENTANYL CITRATE 50 MCG: 50 INJECTION INTRAMUSCULAR; INTRAVENOUS at 12:45

## 2024-02-07 RX ADMIN — Medication 1 MG: at 14:58

## 2024-02-07 RX ADMIN — FENTANYL CITRATE 50 MCG: 0.05 INJECTION, SOLUTION INTRAMUSCULAR; INTRAVENOUS at 13:01

## 2024-02-07 RX ADMIN — ONDANSETRON 4 MG: 2 INJECTION INTRAMUSCULAR; INTRAVENOUS at 12:59

## 2024-02-07 RX ADMIN — ONDANSETRON 4 MG: 2 INJECTION INTRAMUSCULAR; INTRAVENOUS at 12:30

## 2024-02-07 RX ADMIN — DEXAMETHASONE SODIUM PHOSPHATE 10 MG: 4 INJECTION, SOLUTION INTRAMUSCULAR; INTRAVENOUS at 12:30

## 2024-02-07 RX ADMIN — OXYCODONE 5 MG: 5 TABLET ORAL at 15:34

## 2024-02-07 RX ADMIN — MIDAZOLAM 2 MG: 1 INJECTION INTRAMUSCULAR; INTRAVENOUS at 12:18

## 2024-02-07 RX ADMIN — LIDOCAINE HYDROCHLORIDE 80 MG: 20 INJECTION, SOLUTION EPIDURAL; INFILTRATION; INTRACAUDAL; PERINEURAL at 12:24

## 2024-02-07 RX ADMIN — FENTANYL CITRATE 50 MCG: 0.05 INJECTION, SOLUTION INTRAMUSCULAR; INTRAVENOUS at 13:08

## 2024-02-07 ASSESSMENT — PAIN DESCRIPTION - LOCATION
LOCATION: ABDOMEN

## 2024-02-07 ASSESSMENT — PAIN DESCRIPTION - FREQUENCY
FREQUENCY: CONTINUOUS

## 2024-02-07 ASSESSMENT — PAIN - FUNCTIONAL ASSESSMENT
PAIN_FUNCTIONAL_ASSESSMENT: PREVENTS OR INTERFERES SOME ACTIVE ACTIVITIES AND ADLS
PAIN_FUNCTIONAL_ASSESSMENT: 0-10
PAIN_FUNCTIONAL_ASSESSMENT: 0-10
PAIN_FUNCTIONAL_ASSESSMENT: PREVENTS OR INTERFERES SOME ACTIVE ACTIVITIES AND ADLS

## 2024-02-07 ASSESSMENT — PAIN SCALES - GENERAL
PAINLEVEL_OUTOF10: 9
PAINLEVEL_OUTOF10: 10
PAINLEVEL_OUTOF10: 8

## 2024-02-07 ASSESSMENT — ENCOUNTER SYMPTOMS: SHORTNESS OF BREATH: 0

## 2024-02-07 ASSESSMENT — LIFESTYLE VARIABLES: SMOKING_STATUS: 1

## 2024-02-07 ASSESSMENT — PAIN DESCRIPTION - PAIN TYPE
TYPE: ACUTE PAIN
TYPE: ACUTE PAIN
TYPE: SURGICAL PAIN
TYPE: SURGICAL PAIN

## 2024-02-07 ASSESSMENT — PAIN DESCRIPTION - DESCRIPTORS
DESCRIPTORS: SHARP
DESCRIPTORS: SHARP
DESCRIPTORS: ACHING;SHARP
DESCRIPTORS: SHARP

## 2024-02-07 ASSESSMENT — PAIN DESCRIPTION - ORIENTATION
ORIENTATION: MID

## 2024-02-07 ASSESSMENT — PAIN DESCRIPTION - ONSET
ONSET: ON-GOING

## 2024-02-07 NOTE — OP NOTE
Endoscopy Note    Patient: Petrona Green   : 1980  Acct#:     Procedure: Endoscopic retrograde cholangiopancreatography with biliary sphincterotomy  ERCP with balloon sweep  Interpretation of fluoroscopy    Date: 2024    Surgeon:  Mendel Manuel MD    Referring Physician:  Linda ALCALA    Anesthesia:  General per Anesthesia.    Indications:  This is a 43 y.o. year old female who presents today with type 1 sphincter of oddi dysfunction with biliary pain, spiking abnormal transaminases, and a dilated bile duct.    Consent: Informed consent was obtained from the patient after explanation of indications, benefits, possible risks and complications of the procedure.  Specifically the risk of bleeding, infection, perforation, pancreatitis, anesthesia complications, and remote possibility of mortality among others were explained.    Monitoring: Patient was monitored with continuous pulse oximetry, telemetry, and intermittent blood pressures.    Procedure:   A time-out was performed. The patient and staff were in agreement as to the correct patient and procedure.  The above anesthesia was administered by the anesthesia department.  The patient was placed in the left lateral prone position.       The Olympus therapeutic duodenoscope was placed in the patient's mouth and blindly advanced into the esophagus.  The scope was then advanced through the esophagus, stomach and into the second portion of the duodenum where the major papilla was visualized.      Major Papilla: Normal   Film:  Clips in the RUQ  Cholangiogram:  The biliary orifice was then selectively cannulated with a Jagtome. Contrast was injected and revealed a dilated bile duct.  No filling defects.  No strictures.  Intrahepatics were normal.    Next, a 0.025\" Jagwire was passed without resistance into the bile duct and using the wire as a guide and using blended cautery, a 1cm biliary sphincterotomy was performed to the 12:00 position

## 2024-02-07 NOTE — DISCHARGE INSTRUCTIONS
Impression:  1.  Probable type 1 sphincter of oddi dysfunction with dilated bile duct, spiking LFT's, and biliary type abdominal pain.  Biliary  sphincterotomy performed and balloon sweeps performed and negative for stones.      Recommendations:  1.  Clear diet.  If does well overnight can advance diet as tolerated tomorrow.  2.  I notified office to set up follow-up in 2-3 weeks to check on symptoms and repeat LFT's.      Herber Manuel MD  [facility]  2/7/2024    Discharge Instructions for ERCP    Endoscopic retrograde cholangiopancreatography (ERCP) allows the physician to enter the bile duct and the pancreatic duct with a probe.  Contrast dye can then be injected into these ducts allowing the physician to evaluate the duct on x-ray imaging.  If a stone is identified, this can be removed by the endoscopist.  If a stricture is identified, biopsies can be obtained and a stent can be placed.      The primary complication of ERCP is pancreatitis.  This occurs because the bile duct and the pancreatic duct empty through the same opening.  Entering this opening can lead to irritation of the pancreas which contains digestive enzymes.  If these enzymes are released in the pancreas, this can lead to an inflammatory response of the pancreas called pancreatitis.  Usually, pancreatitis resolves in 3-4 days.  Treatment is with pancreatic rest (nothing to eat) and intravenous fluids.  Rarely pancreatitis can be more severe and last months to years or require surgery.    Other risks with ERCP include perforation (which might require surgery) and bleeding (which could require blood transfusion ).     What You Will Need:  Someone to drive you home after the procedure     Steps to Take:  Home Care -  Rest when you get home.   Because the sedative will make you drowsy, don't drive, operate machinery, or make important decisions the day of the procedure.  Feelings of bloating, gas, or cramping may persist for 24 hours.   Diet

## 2024-02-07 NOTE — PROGRESS NOTES
Patient arrived to phase 2. Vss and assessment complete. Pt resting in bed but rating pain 9/10. PRN pain medication given see eMAR. Tolerating PO, brother at bedside. Pt wanting to Dc home. Will monitor.

## 2024-02-07 NOTE — H&P
ondansetron (ZOFRAN-ODT) 4 MG disintegrating tablet Take 1 tablet by mouth 3 times daily as needed for Nausea or Vomiting 21 tablet 0    lamoTRIgine (LAMICTAL) 25 MG tablet Take 4 tablets by mouth at bedtime          Allergies:  Bee venom, Bentyl [dicyclomine hcl], Dicyclomine, Ketorolac tromethamine, Levofloxacin, Maitake, Shiitake mushroom, Sulfa antibiotics, Vancomycin, Zosyn [piperacillin sod-tazobactam so], Adhesive tape, Sulfacetamide, Acetaminophen, Butalbital-apap-caff-cod, Ceftaroline, Daptomycin, Flagyl [metronidazole], Haldol [haloperidol], Keppra [levetiracetam], Ketamine, Methocarbamol, Nitrofurantoin, Prochlorperazine, Reglan [metoclopramide], Silicone, and Tazobactam    Social History:   Social History     Socioeconomic History    Marital status:      Spouse name: Not on file    Number of children: Not on file    Years of education: Not on file    Highest education level: Not on file   Occupational History    Occupation: disability, SSI    Tobacco Use    Smoking status: Every Day     Current packs/day: 0.25     Average packs/day: 0.3 packs/day for 23.3 years (5.8 ttl pk-yrs)     Types: Cigarettes     Start date: 10/10/2000    Smokeless tobacco: Never   Vaping Use    Vaping Use: Never used   Substance and Sexual Activity    Alcohol use: No    Drug use: Never    Sexual activity: Yes     Partners: Male   Other Topics Concern    Not on file   Social History Narrative    Not on file     Social Determinants of Health     Financial Resource Strain: Low Risk  (10/9/2023)    Overall Financial Resource Strain (CARDIA)     Difficulty of Paying Living Expenses: Not very hard   Food Insecurity: Not on file (11/4/2023)   Transportation Needs: No Transportation Needs (11/17/2023)    Transportation Problems ([facility] HRSN)     In the past 12 months, has lack of reliable transportation kept you from medical appointments, meetings, work or from getting things needed for daily living?: Not on file   Physical

## 2024-02-07 NOTE — ANESTHESIA PRE PROCEDURE
Department of Anesthesiology  Preprocedure Note       Name:  Petrona Green   Age:  43 y.o.  :  1980                                          MRN:  [id number]         Date:  2024      Surgeon: Surgeon(s):  Mendel Manuel MD    Procedure: Procedure(s):  ENDOSCOPIC RETROGRADE CHOLANGIOPANCREATOGRAPHY    Medications prior to admission:   Prior to Admission medications    Medication Sig Start Date End Date Taking? Authorizing Provider   promethazine (PHENERGAN) 25 MG tablet Take 1 tablet by mouth every 6 hours as needed for Nausea   Yes ProviderAundrea MD   atorvastatin (LIPITOR) 10 MG tablet Take 1 tablet by mouth at bedtime 24   Linda Fox APRN   gabapentin (NEURONTIN) 400 MG capsule Take 1 capsule by mouth 3 times daily for 180 days. Intended supply: 30 days 24  Linda Fox APRN   ondansetron (ZOFRAN-ODT) 4 MG disintegrating tablet Take 1 tablet by mouth 3 times daily as needed for Nausea or Vomiting 23   Symone Chowdhury PA-C   lamoTRIgine (LAMICTAL) 25 MG tablet Take 4 tablets by mouth at bedtime 3/2/23   ProviderAundrea MD       Current medications:    Current Facility-Administered Medications   Medication Dose Route Frequency Provider Last Rate Last Admin    sodium chloride flush 0.9 % injection 5-40 mL  5-40 mL IntraVENous 2 times per day Dimitris Arteaga MD        sodium chloride flush 0.9 % injection 5-40 mL  5-40 mL IntraVENous PRN Dimitris Arteaga MD        0.9 % sodium chloride infusion   IntraVENous PRN Dimitris Arteaga MD           Allergies:    Allergies   Allergen Reactions    Bee Venom Shortness Of Breath and Swelling    Bentyl [Dicyclomine Hcl] Shortness Of Breath and Anxiety    Dicyclomine Anxiety and Shortness Of Breath    Ketorolac Tromethamine Hives and Itching       Tolerates PO NSAIDs fine    Levofloxacin Anxiety and Hives    Maitake Anaphylaxis    Shiitake Mushroom Anaphylaxis     Can not eat mushrooms    Sulfa Antibiotics Shortness Of Breath

## 2024-02-07 NOTE — ANESTHESIA POSTPROCEDURE EVALUATION
Department of Anesthesiology  Postprocedure Note    Patient: Petrona Green  MRN: [id number]  YOB: 1980  Date of evaluation: 2/7/2024    Procedure Summary       Date: 02/07/24 Room / Location: [place] [id number] / [facility]    Anesthesia Start: 1219 Anesthesia Stop: 1252    Procedures:       ERCP SPHINCTER/PAPILLOTOMY      ERCP balloon sweep Diagnosis:       Common bile duct dilatation      (Common bile duct dilatation [K83.8])    Surgeons: Mendel Manuel MD Responsible Provider: Yvan Garrido MD    Anesthesia Type: MAC ASA Status: 2            Anesthesia Type: MAC    Harlan Phase I: Harlan Score: 8    Harlan Phase II: Harlan Score: 10    Anesthesia Post Evaluation    Patient location during evaluation: PACU  Patient participation: complete - patient participated  Level of consciousness: awake  Airway patency: patent  Nausea & Vomiting: no vomiting and nausea (improved with phenergren)  Cardiovascular status: hemodynamically stable and blood pressure returned to baseline  Respiratory status: spontaneous ventilation, nonlabored ventilation and room air (Deep breathing and cough encouraged.)  Hydration status: stable  Comments: Ms. Green continues to report RUQ discomfort. Suspect anxiety likely contributory. Labs & CT were ordered by Dr. Manuel, final radiology read pending, but no obvious findings on review of available images. Anticipate return to [facility] for planned discharge home with . Final disposition per Dr. Manuel.  Pain management: adequate    No notable events documented.

## 2024-02-08 ENCOUNTER — APPOINTMENT (OUTPATIENT)
Dept: CT IMAGING | Age: 44
End: 2024-02-08
Payer: COMMERCIAL

## 2024-02-08 ENCOUNTER — HOSPITAL ENCOUNTER (EMERGENCY)
Age: 44
Discharge: HOME | End: 2024-02-08
Payer: COMMERCIAL

## 2024-02-08 VITALS
TEMPERATURE: 98.8 F | WEIGHT: 180.56 LBS | DIASTOLIC BLOOD PRESSURE: 87 MMHG | BODY MASS INDEX: 36.4 KG/M2 | SYSTOLIC BLOOD PRESSURE: 118 MMHG | HEART RATE: 90 BPM | HEIGHT: 59 IN | OXYGEN SATURATION: 99 % | RESPIRATION RATE: 18 BRPM

## 2024-02-08 DIAGNOSIS — R10.11 ABDOMINAL PAIN, RIGHT UPPER QUADRANT: Primary | ICD-10-CM

## 2024-02-08 DIAGNOSIS — R11.0 NAUSEA: ICD-10-CM

## 2024-02-08 LAB
ALBUMIN SERPL-MCNC: 4.3 G/DL (ref 3.4–5)
ALBUMIN/GLOB SERPL: 1.3 {RATIO} (ref 1.1–2.2)
ALP SERPL-CCNC: 119 U/L (ref 40–129)
ALT SERPL-CCNC: 32 U/L (ref 10–40)
ANION GAP SERPL CALCULATED.3IONS-SCNC: 12 MMOL/L (ref 3–16)
AST SERPL-CCNC: 22 U/L (ref 15–37)
BASOPHILS # BLD: 0.1 K/UL (ref 0–0.2)
BASOPHILS NFR BLD: 0.6 %
BILIRUB SERPL-MCNC: <0.2 MG/DL (ref 0–1)
BUN SERPL-MCNC: 13 MG/DL (ref 7–20)
CALCIUM SERPL-MCNC: 9.2 MG/DL (ref 8.3–10.6)
CHLORIDE SERPL-SCNC: 103 MMOL/L (ref 99–110)
CO2 SERPL-SCNC: 25 MMOL/L (ref 21–32)
CREAT SERPL-MCNC: 0.6 MG/DL (ref 0.6–1.1)
DEPRECATED RDW RBC AUTO: 14.1 % (ref 12.4–15.4)
EOSINOPHIL # BLD: 0 K/UL (ref 0–0.6)
EOSINOPHIL NFR BLD: 0.1 %
GFR SERPLBLD CREATININE-BSD FMLA CKD-EPI: >60 ML/MIN/{1.73_M2}
GLUCOSE SERPL-MCNC: 95 MG/DL (ref 70–99)
HCT VFR BLD AUTO: 42.5 % (ref 36–48)
HGB BLD-MCNC: 14.1 G/DL (ref 12–16)
LIPASE SERPL-CCNC: 16 U/L (ref 13–60)
LYMPHOCYTES # BLD: 3.5 K/UL (ref 1–5.1)
LYMPHOCYTES NFR BLD: 17.3 %
MCH RBC QN AUTO: 28.7 PG (ref 26–34)
MCHC RBC AUTO-ENTMCNC: 33.2 G/DL (ref 31–36)
MCV RBC AUTO: 86.4 FL (ref 80–100)
MONOCYTES # BLD: 1.1 K/UL (ref 0–1.3)
MONOCYTES NFR BLD: 5.3 %
NEUTROPHILS # BLD: 15.4 K/UL (ref 1.7–7.7)
NEUTROPHILS NFR BLD: 76.7 %
PLATELET # BLD AUTO: 318 K/UL (ref 135–450)
PMV BLD AUTO: 8.5 FL (ref 5–10.5)
POTASSIUM SERPL-SCNC: 4.3 MMOL/L (ref 3.5–5.1)
PROT SERPL-MCNC: 7.6 G/DL (ref 6.4–8.2)
RBC # BLD AUTO: 4.92 M/UL (ref 4–5.2)
SODIUM SERPL-SCNC: 140 MMOL/L (ref 136–145)
WBC # BLD AUTO: 20 K/UL (ref 4–11)

## 2024-02-08 PROCEDURE — 96375 TX/PRO/DX INJ NEW DRUG ADDON: CPT

## 2024-02-08 PROCEDURE — 96376 TX/PRO/DX INJ SAME DRUG ADON: CPT

## 2024-02-08 PROCEDURE — 80053 COMPREHEN METABOLIC PANEL: CPT

## 2024-02-08 PROCEDURE — 74177 CT ABD & PELVIS W/CONTRAST: CPT

## 2024-02-08 PROCEDURE — 99285 EMERGENCY DEPT VISIT HI MDM: CPT

## 2024-02-08 PROCEDURE — 6360000004 HC RX CONTRAST MEDICATION: Performed by: PHYSICIAN ASSISTANT

## 2024-02-08 PROCEDURE — 85025 COMPLETE CBC W/AUTO DIFF WBC: CPT

## 2024-02-08 PROCEDURE — 96374 THER/PROPH/DIAG INJ IV PUSH: CPT

## 2024-02-08 PROCEDURE — 83690 ASSAY OF LIPASE: CPT

## 2024-02-08 PROCEDURE — 6360000002 HC RX W HCPCS: Performed by: PHYSICIAN ASSISTANT

## 2024-02-08 RX ORDER — MORPHINE SULFATE 4 MG/ML
4 INJECTION, SOLUTION INTRAMUSCULAR; INTRAVENOUS ONCE
Status: COMPLETED | OUTPATIENT
Start: 2024-02-08 | End: 2024-02-08

## 2024-02-08 RX ORDER — TRAMADOL HYDROCHLORIDE 50 MG/1
50 TABLET ORAL EVERY 6 HOURS PRN
Qty: 12 TABLET | Refills: 0 | Status: SHIPPED | OUTPATIENT
Start: 2024-02-08 | End: 2024-02-11

## 2024-02-08 RX ORDER — ONDANSETRON 2 MG/ML
4 INJECTION INTRAMUSCULAR; INTRAVENOUS ONCE
Status: COMPLETED | OUTPATIENT
Start: 2024-02-08 | End: 2024-02-08

## 2024-02-08 RX ORDER — MORPHINE SULFATE 2 MG/ML
2 INJECTION, SOLUTION INTRAMUSCULAR; INTRAVENOUS ONCE
Status: COMPLETED | OUTPATIENT
Start: 2024-02-08 | End: 2024-02-08

## 2024-02-08 RX ORDER — ONDANSETRON 4 MG/1
4 TABLET, ORALLY DISINTEGRATING ORAL 3 TIMES DAILY PRN
Qty: 12 TABLET | Refills: 0 | Status: SHIPPED | OUTPATIENT
Start: 2024-02-08 | End: 2024-02-16 | Stop reason: ALTCHOICE

## 2024-02-08 RX ADMIN — MORPHINE SULFATE 4 MG: 4 INJECTION, SOLUTION INTRAMUSCULAR; INTRAVENOUS at 17:33

## 2024-02-08 RX ADMIN — ONDANSETRON 4 MG: 2 INJECTION INTRAMUSCULAR; INTRAVENOUS at 17:33

## 2024-02-08 RX ADMIN — IOPAMIDOL 75 ML: 755 INJECTION, SOLUTION INTRAVENOUS at 19:17

## 2024-02-08 RX ADMIN — MORPHINE SULFATE 2 MG: 2 INJECTION, SOLUTION INTRAMUSCULAR; INTRAVENOUS at 20:21

## 2024-02-08 RX ADMIN — ONDANSETRON 4 MG: 2 INJECTION INTRAMUSCULAR; INTRAVENOUS at 20:20

## 2024-02-08 ASSESSMENT — PAIN DESCRIPTION - FREQUENCY: FREQUENCY: CONTINUOUS

## 2024-02-08 ASSESSMENT — PAIN DESCRIPTION - DESCRIPTORS
DESCRIPTORS: DISCOMFORT
DESCRIPTORS: SHARP

## 2024-02-08 ASSESSMENT — PAIN DESCRIPTION - LOCATION
LOCATION: ABDOMEN

## 2024-02-08 ASSESSMENT — LIFESTYLE VARIABLES: HOW OFTEN DO YOU HAVE A DRINK CONTAINING ALCOHOL: NEVER

## 2024-02-08 ASSESSMENT — PAIN SCALES - GENERAL
PAINLEVEL_OUTOF10: 5
PAINLEVEL_OUTOF10: 5
PAINLEVEL_OUTOF10: 9
PAINLEVEL_OUTOF10: 9

## 2024-02-08 ASSESSMENT — PAIN DESCRIPTION - ORIENTATION
ORIENTATION: RIGHT
ORIENTATION: RIGHT;UPPER
ORIENTATION: UPPER

## 2024-02-08 ASSESSMENT — PAIN DESCRIPTION - ONSET: ONSET: ON-GOING

## 2024-02-08 ASSESSMENT — PAIN - FUNCTIONAL ASSESSMENT
PAIN_FUNCTIONAL_ASSESSMENT: 0-10
PAIN_FUNCTIONAL_ASSESSMENT: 0-10

## 2024-02-09 ENCOUNTER — CARE COORDINATION (OUTPATIENT)
Dept: CARE COORDINATION | Age: 44
End: 2024-02-09

## 2024-02-09 NOTE — PLAN OF CARE
EMERGENCY DEPARTMENT ENCOUNTER  Room Number:  E549/1  PCP: Taiwo Powers MD  Independent Historians: Patient and Friend      HPI:  Chief Complaint: Cough, shortness of breath, and anxiety    A complete HPI/ROS/PMH/PSH/SH/FH are unobtainable due to: None    Chronic or social conditions impacting patient care (Social Determinants of Health): None      Context: Mehreen Silva IV is a 85 y.o. male with a medical history of AKA amputation right lower extremity, atrial fibrillation, hyperlipidemia and CHF who presents to the ED c/o acute shortness of breath with cough and congestion.  He says for the past couple weeks he has had persistent cough with production of phlegm.  This is making him increasingly short of breath.  With his shortness of breath he is having increased anxiety as well.  He has had some mild chest discomfort on the right side.  He denies fevers.  He did have a right lower extremity amputation procedure a couple months ago and his wound has been healing well.  He denies any new swelling or redness or rashes to either of the lower extremities.      Review of prior external notes (non-ED) -and- Review of prior external test results outside of this encounter: I reviewed the discharge summary by vascular surgery on December 16, 2023 following his AKA right lower extremity potation.  He was restarted on Eliquis at that time.      PAST MEDICAL HISTORY  Active Ambulatory Problems     Diagnosis Date Noted    Cervical spondylosis with myelopathy 06/06/2016    Lumbar radiculopathy 06/06/2016    Hx of toxic multinodular goiter 09/23/2022    HLD (hyperlipidemia) 09/23/2022    (HFpEF) heart failure with preserved ejection fraction 09/24/2022    Atrial fibrillation, persistent 09/24/2022    Thyroid disease     Acute on chronic diastolic CHF (congestive heart failure) 11/08/2023    Cervical myelopathy 11/08/2023    Chronic heart failure with preserved ejection fraction (HFpEF) 11/12/2023    Chronic  Problem: Discharge Planning:  Goal: Discharged to appropriate level of care  Description: Discharged to appropriate level of care  Outcome: Ongoing     Problem:  Body Temperature - Imbalanced:  Goal: Ability to maintain a body temperature in the normal range will improve  Description: Ability to maintain a body temperature in the normal range will improve  Outcome: Ongoing     Problem: Mobility - Impaired:  Goal: Mobility will improve to maximum level  Description: Mobility will improve to maximum level  Outcome: Ongoing     Problem: Pain:  Goal: Pain level will decrease  Description: Pain level will decrease  Outcome: Ongoing  Goal: Control of acute pain  Description: Control of acute pain  Outcome: Ongoing  Goal: Control of chronic pain  Description: Control of chronic pain  Outcome: Ongoing     Problem: Skin Integrity - Impaired:  Goal: Will show no infection signs and symptoms  Description: Will show no infection signs and symptoms  Outcome: Ongoing  Goal: Absence of new skin breakdown  Description: Absence of new skin breakdown  Outcome: Ongoing     Problem: Pain:  Goal: Pain level will decrease  Description: Pain level will decrease  Outcome: Ongoing  Goal: Control of acute pain  Description: Control of acute pain  Outcome: Ongoing  Goal: Control of chronic pain  Description: Control of chronic pain  Outcome: Ongoing osteomyelitis of right ankle with draining sinus 2023    Ankle osteomyelitis, left 2023     Resolved Ambulatory Problems     Diagnosis Date Noted    Hypoxia 2023    Hyperkalemia 2023    Hyperglycemia 2023     Past Medical History:   Diagnosis Date    Arthritis     Atrial fibrillation     CHF (congestive heart failure)     Disease of thyroid gland     Pulmonary hypertension     Ulcer with gangrene          PAST SURGICAL HISTORY  Past Surgical History:   Procedure Laterality Date    ABOVE KNEE AMPUTATION Right 2023    Procedure: ABOVE KNEE AMPUTATION RIGHT, proveena wound vac placement;  Surgeon: Issa Nieves MD;  Location: [facility];  Service: Vascular;  Laterality: Right;    APPENDECTOMY      BACK SURGERY      COLONOSCOPY      EXCISION MASS TRUNK N/A 2016    Procedure: Excision soft tissue neoplasm on abdominal wall and left neck;  Surgeon: Shaji Baarhona Jr., MD;  Location: [facility];  Service:     KNEE SURGERY      NECK SURGERY  1974    SHOULDER SURGERY           FAMILY HISTORY  Family History   Problem Relation Age of Onset    Cancer Mother     Heart disease Father     Aneurysm Father     Malig Hyperthermia Neg Hx          SOCIAL HISTORY  Social History     Socioeconomic History    Marital status:     Number of children: 1    Highest education level: Professional school degree (e.g., MD, DDS, DVM, BRITNEY)   Tobacco Use    Smoking status: Former     Types: Cigarettes     Quit date: 1983     Years since quittin.7    Smokeless tobacco: Never   Vaping Use    Vaping Use: Never used   Substance and Sexual Activity    Alcohol use: Yes     Comment: SOCIAL    Drug use: No    Sexual activity: Defer         ALLERGIES  Patient has no known allergies.      REVIEW OF SYSTEMS  Review of Systems  Included in HPI  All systems reviewed and negative except for those discussed in HPI.      PHYSICAL EXAM    I have reviewed the triage vital signs and nursing  notes.    ED Triage Vitals [02/09/24 1027]   Temp Heart Rate Resp BP SpO2   97.5 °F (36.4 °C) 99 18 -- 95 %      Temp src Heart Rate Source Patient Position BP Location FiO2 (%)   -- -- -- -- --       Physical Exam  GENERAL: alert, appears anxious, no acute distress  SKIN: Warm, dry, no diaphoresis  HENT: Normocephalic, atraumatic  EYES: no scleral icterus EOMI  CV: Irregularly irregular rhythm, tachycardic rate averaging around 115 bpm  RESPIRATORY: normal effort at rest.  No accessory muscle use noted.  Right lung fields are basically absent.  Left lung fields auscultation demonstrates some scattered rhonchi but no wheezes.  No coughing during my evaluation.  No stridor.  ABDOMEN: soft, nontender, nondistended  MUSCULOSKELETAL: Right lower extremity amputation noted.  Left lower extremity shows no significant edema at the lower leg or ankle  NEURO: alert, moves all extremities, follows commands, no facial droop, speech is clear and articulate      NIH:                                                             LAB RESULTS  Recent Results (from the past 24 hour(s))   ECG 12 Lead ED Triage Standing Order; SOA    Collection Time: 02/09/24 10:33 AM   Result Value Ref Range    QT Interval 323 ms    QTC Interval 470 ms   Comprehensive Metabolic Panel    Collection Time: 02/09/24 11:14 AM    Specimen: Blood   Result Value Ref Range    Glucose 115 (H) 65 - 99 mg/dL    BUN 21 8 - 23 mg/dL    Creatinine 0.94 0.76 - 1.27 mg/dL    Sodium 142 136 - 145 mmol/L    Potassium 5.0 3.5 - 5.2 mmol/L    Chloride 103 98 - 107 mmol/L    CO2 29.0 22.0 - 29.0 mmol/L    Calcium 9.1 8.6 - 10.5 mg/dL    Total Protein 6.2 6.0 - 8.5 g/dL    Albumin 3.4 (L) 3.5 - 5.2 g/dL    ALT (SGPT) 26 1 - 41 U/L    AST (SGOT) 25 1 - 40 U/L    Alkaline Phosphatase 145 (H) 39 - 117 U/L    Total Bilirubin 0.9 0.0 - 1.2 mg/dL    Globulin 2.8 gm/dL    A/G Ratio 1.2 g/dL    BUN/Creatinine Ratio 22.3 7.0 - 25.0    Anion Gap 10.0 5.0 - 15.0 mmol/L    eGFR 80.4  >60.0 mL/min/1.73   BNP    Collection Time: 02/09/24 11:14 AM    Specimen: Blood   Result Value Ref Range    proBNP 4,357.0 (H) 0.0 - 1,800.0 pg/mL   Single High Sensitivity Troponin T    Collection Time: 02/09/24 11:14 AM    Specimen: Blood   Result Value Ref Range    HS Troponin T 65 (C) <22 ng/L   Green Top (Gel)    Collection Time: 02/09/24 11:14 AM   Result Value Ref Range    Extra Tube Hold for add-ons.    Lavender Top    Collection Time: 02/09/24 11:14 AM   Result Value Ref Range    Extra Tube hold for add-on    Gold Top - SST    Collection Time: 02/09/24 11:14 AM   Result Value Ref Range    Extra Tube Hold for add-ons.    Light Blue Top    Collection Time: 02/09/24 11:14 AM   Result Value Ref Range    Extra Tube Hold for add-ons.    CBC Auto Differential    Collection Time: 02/09/24 11:14 AM    Specimen: Blood   Result Value Ref Range    WBC 7.58 3.40 - 10.80 10*3/mm3    RBC 5.92 (H) 4.14 - 5.80 10*6/mm3    Hemoglobin 14.7 13.0 - 17.7 g/dL    Hematocrit 46.6 37.5 - 51.0 %    MCV 78.7 (L) 79.0 - 97.0 fL    MCH 24.8 (L) 26.6 - 33.0 pg    MCHC 31.5 31.5 - 35.7 g/dL    RDW 18.7 (H) 12.3 - 15.4 %    RDW-SD 49.8 37.0 - 54.0 fl    MPV 10.7 6.0 - 12.0 fL    Platelets 199 140 - 450 10*3/mm3    Neutrophil % 78.1 (H) 42.7 - 76.0 %    Lymphocyte % 12.8 (L) 19.6 - 45.3 %    Monocyte % 7.8 5.0 - 12.0 %    Eosinophil % 0.7 0.3 - 6.2 %    Basophil % 0.5 0.0 - 1.5 %    Immature Grans % 0.1 0.0 - 0.5 %    Neutrophils, Absolute 5.92 1.70 - 7.00 10*3/mm3    Lymphocytes, Absolute 0.97 0.70 - 3.10 10*3/mm3    Monocytes, Absolute 0.59 0.10 - 0.90 10*3/mm3    Eosinophils, Absolute 0.05 0.00 - 0.40 10*3/mm3    Basophils, Absolute 0.04 0.00 - 0.20 10*3/mm3    Immature Grans, Absolute 0.01 0.00 - 0.05 10*3/mm3    nRBC 0.0 0.0 - 0.2 /100 WBC   Lactic Acid, Plasma    Collection Time: 02/09/24 11:14 AM    Specimen: Blood   Result Value Ref Range    Lactate 2.2 (C) 0.5 - 2.0 mmol/L   Procalcitonin    Collection Time: 02/09/24 11:14 AM     Specimen: Blood   Result Value Ref Range    Procalcitonin 0.08 0.00 - 0.25 ng/mL   STAT Lactic Acid, Reflex    Collection Time: 02/09/24  2:01 PM    Specimen: Blood   Result Value Ref Range    Lactate 2.0 0.5 - 2.0 mmol/L   High Sensitivity Troponin T 2Hr    Collection Time: 02/09/24  2:01 PM    Specimen: Blood   Result Value Ref Range    HS Troponin T 60 (C) <22 ng/L    Troponin T Delta -5 (L) >=-4 - <+4 ng/L         RADIOLOGY  XR Chest 1 View    Result Date: 2/9/2024  AP CHEST  HISTORY: Evaluate pleural effusion  COMPARISON: Earlier today  FINDINGS: Status post placement of right chest tube. Significant decrease in size of right pleural effusion with significant improved aeration of the right lung. Heart size stable and left lung remains clear.      Significant increase in size of right pleural effusion    This report was finalized on 2/9/2024 5:03 PM by Dr. Randy Villa M.D on Workstation: [id number]      CT Angiogram Chest    Result Date: 2/9/2024  CT ANGIOGRAM OF THE CHEST WITH CONTRAST INCLUDING RECONSTRUCTION IMAGES 02/09/2024  HISTORY: Shortness of breath.  Following the intravenous contrast injection CT angiography was performed through the chest. Sagittal, coronal and 3D reconstruction images were reviewed.  The pulmonary arterial system is somewhat heterogeneous but no definite pulmonary embolus is seen. The heterogeneity is probably artifact.  There is a large right pleural effusion with atelectasis of the right lung. There is a small left pleural effusion.  Small amount of fluid is seen in pericardial recesses. There is some aortic and coronary calcification.      1. Large right pleural effusion with atelectasis of the right lung. The right hemithorax is completely opacified. 2. Small left pleural effusion. 3. The pulmonary arterial system is somewhat heterogeneous but no definite pulmonary embolus is seen.    Radiation dose reduction techniques were utilized, including automated exposure control and  exposure modulation based on body size.       XR Chest 1 View    Result Date: 2/9/2024  CHEST SINGLE VIEW  HISTORY: Chest pain. Shortness of air and cough.  COMPARISON: AP chest 11/09/2023, CT chest 11/12/2023.  FINDINGS: There has developed complete white out of the right thorax due to large right pleural effusion and associated right basilar atelectasis or infiltrate. Left lung appears clear. There appears to be mild right to left cardiomediastinal shift that is likely related to the presence of pleural fluid.      New white out of the right lung appears to be mostly related to a large pleural effusion, though there is also a suspected component of atelectasis or infiltrate. There is mild right to left cardiomediastinal shift. CT would be the best means to further evaluate.  This report was finalized on 2/9/2024 11:09 AM by Dr. Ciro Casey M.D on Workstation: [id number]         MEDICATIONS GIVEN IN ER  Medications   sodium chloride 0.9 % flush 10 mL (has no administration in time range)   sodium chloride 0.9 % flush 10 mL (has no administration in time range)   Hold medication (has no administration in time range)   azithromycin (ZITHROMAX) 500 mg in sodium chloride 0.9 % 250 mL IVPB-VTB (has no administration in time range)   guaiFENesin (MUCINEX) 12 hr tablet 1,200 mg (has no administration in time range)   oxyCODONE (ROXICODONE) immediate release tablet 5 mg (has no administration in time range)   iopamidol (ISOVUE-370) 76 % injection 100 mL (95 mL Intravenous Given by Other 2/9/24 1202)   cefTRIAXone (ROCEPHIN) 1,000 mg in sodium chloride 0.9 % 100 mL IVPB-VTB (0 mg Intravenous Stopped 2/9/24 1423)   lidocaine (XYLOCAINE) 1 % injection 20 mL (20 mL Infiltration Given by Other 2/9/24 1524)         ORDERS PLACED DURING THIS VISIT:  Orders Placed This Encounter   Procedures    Blood Culture - Blood,    Blood Culture - Blood,    Body Fluid Culture - Body Fluid, Pleural Cavity    AFB Culture - Drainage, Lung, R     Fungus Culture - Drainage, Lung, R    XR Chest 1 View    CT Angiogram Chest    CT Guided Chest Tube    XR Chest 1 View    XR Chest 1 View    [place] Draw    Comprehensive Metabolic Panel    BNP    Single High Sensitivity Troponin T    CBC Auto Differential    Lactic Acid, Plasma    Procalcitonin    STAT Lactic Acid, Reflex    High Sensitivity Troponin T 2Hr    Glucose, Body Fluid - Body Fluid, Pleural Cavity    Protein, Body Fluid - Body Fluid, Pleural Cavity    Lactate Dehydrogenase, Body Fluid - Body Fluid, Pleural Cavity    Amylase, Body Fluid - Body Fluid, Pleural Cavity    NPO Diet NPO Type: Strict NPO    Undress & Gown    Vital Signs    Monitor Blood Pressure    Pulse Oximetry, Continuous    Obtain Informed Consent    Chest Tube To Continuous Suction    Pulmonology (on-call MD unless specified)    [facility] (on-call MD unless specified) Details    Inpatient Thoracic Surgery Consult    Oxygen Therapy- Nasal Cannula; Titrate 1-6 LPM Per SpO2; 90 - 95%    Incentive Spirometry    Oscillating Positive Expiratory Pressure (OPEP)    ECG 12 Lead ED Triage Standing Order; SOA    Insert Peripheral IV    Insert Peripheral IV    Initiate Observation Status    Inpatient Admission    Transfer Patient    CBC & Differential    Green Top (Gel)    Lavender Top    Gold Top - SST    Light Blue Top         OUTPATIENT MEDICATION MANAGEMENT:  Current Facility-Administered Medications Ordered in Epic   Medication Dose Route Frequency Provider Last Rate Last Admin    azithromycin (ZITHROMAX) 500 mg in sodium chloride 0.9 % 250 mL IVPB-VTB  500 mg Intravenous Once Mile Youngblood MD        guaiFENesin (MUCINEX) 12 hr tablet 1,200 mg  1,200 mg Oral Q12H Mary Augustin DNP, APRN        Hold medication   Does not apply Continuous PRN Gerardo Tinajero MD        oxyCODONE (ROXICODONE) immediate release tablet 5 mg  5 mg Oral Q4H PRN Mary Augustin DNP, APRN        sodium chloride 0.9 % flush 10 mL  10 mL Intravenous PRN Duyen  Sherwin NI MD        sodium chloride 0.9 % flush 10 mL  10 mL Intravenous PRN Sherwin Geller MD         No current [facility]-ordered outpatient medications on file.         PROCEDURES  Procedures        PROGRESS, DATA ANALYSIS, CONSULTS, AND MEDICAL DECISION MAKING  All labs have been independently interpreted by me.  All radiology studies have been reviewed by me. All EKG's have been independently viewed and interpreted by me.  Discussion below represents my analysis of pertinent findings related to patient's condition, differential diagnosis, treatment plan and final disposition.    Differential diagnosis includes but is not limited to pneumonia, pneumothorax, pulmonary embolism, CHF, COPD, anxiety, viral URI.    Clinical Scores:                  ED Course as of 02/09/24 1709   Fri Feb 09, 2024   1052 I independently interpreted the chest x-ray and my findings are: Complete whiteout of the right hemithorax, heart size is normal [BRITNEY]   1052 Given the abnormality of the chest x-ray, I am ordering a CT of the chest for further clarification.  Will go ahead and proceed with typical workup and treatment plan for presumed pneumonia.  Ordering blood cultures and will start some antibiotics.  Thankfully, his work of breathing and his oxygenation is normal on room air right now.  Will continue to monitor carefully. [BRITNEY]   1053 EKG         EKG time/Interp time: 1033/1035  Rhythm/Rate: Atrial fibrillation with RVR, 127 bpm  P waves and AR: None  QRS, axis: 76 ms, normal axis  ST and T waves: No ST segment elevations are present.  Independently interpreted by me contemporaneously with treatment   [BRITNEY]   1053 Patient does have some element of atrial fibrillation and RVR at this time.  However the heart rate is pretty variable with ranges from .  I am not inclined to treat that emergently yet.  He says that he took his usual dose of metoprolol this morning.  Will see if he needs any additional rate control assistance over time  here. [BRITNEY]   1213 HS Troponin T(!!): 65  Troponin is elevated although I do not suspect an acute MI.  He is not having any chest pain at this time.  I think this elevation is probably more of a reflection of secondary injury due to pulmonary pathology [BRITNEY]   1256 I discussed with Dr. Tinajero from pulmonology service about this patient.  He agrees to consult on the patient.  He request that we admit him to the hospitalist service. [BRITNEY]   1312 I discussed with Dr. Washington from [facility] about this patient.  He agrees to accept him to the hospitalist service for further medical management today.  He does request that we initiate some antibiotic therapy to cover for possibility of pneumonia at this time. [BRITNEY]   1707 HS Troponin T(!!): 60 [BRITNEY]   1707 Troponin T Delta(!): -5 [BRITNEY]   1707 proBNP(!): 4,357.0 [BRITNEY]      ED Course User Index  [BRITNEY] Sherwin Geller MD           AS OF 17:09 EST VITALS:    BP - 102/68  HR - 91  TEMP - 97.5 °F (36.4 °C)  O2 SATS - 96%    COMPLEXITY OF CARE  The patient requires admission.      DIAGNOSIS  Final diagnoses:   Pleural effusion   Dyspnea, unspecified type   Elevated troponin         DISPOSITION  ED Disposition       ED Disposition   Decision to Admit    Condition   --    Comment   Level of Care: Telemetry [5]   Diagnosis: Pleural effusion [625303]   Admitting Physician: BONILLA OTOOLE [[id number]]   Attending Physician: BONILLA OTOOLE [[id number]]   Certification: I Certify That Inpatient Hospital Services Are Medically Necessary For Greater Than 2 Midnights                  Please note that portions of this document were completed with a voice recognition program.    Note Disclaimer: At [facility], we believe that sharing information builds trust and better relationships. You are receiving this note because you recently visited [facility]. It is possible you will see health information before a provider has talked with you about it. This kind of information can be easy to misunderstand. To  help you fully understand what it means for your health, we urge you to discuss this note with your provider.         Sherwin Geller MD  02/09/24 0505

## 2024-02-09 NOTE — CARE COORDINATION
Call to patient, LM re PCP response    Thanks - if she is having fevers, chills needs to go back to ER.   Elevated CBC may be reactive.   Can schedule 30 min ER follow up with myself pending Dr Lopez plan

## 2024-02-09 NOTE — DISCHARGE INSTRUCTIONS
Follow-up with your gastroenterologist Dr. Manuel  Take prescribed medication as prescribed only for pain and nausea vomiting

## 2024-02-09 NOTE — ED PROVIDER NOTES
**ADVANCED PRACTICE PROVIDER, I HAVE EVALUATED THIS PATIENT**        [facility] EMERGENCY DEPARTMENT  EMERGENCY DEPARTMENT ENCOUNTER      Pt Name: Petrona Green  MRN:[id number]  Birthdate 1980  Date of evaluation: 2/8/2024  Provider: Aryan Suárez PA-C  Note Started: 8:52 PM EST 2/8/24        Chief Complaint:    Chief Complaint   Patient presents with    Abdominal Pain     Abdominal pain, nausea/vomiting,          Nursing Notes, Past Medical Hx, Past Surgical Hx, Social Hx, Allergies, and Family Hx were all reviewed and agreed with or any disagreements were addressed in the HPI.    HPI: (Location, Duration, Timing, Severity, Quality, Assoc Sx, Context, Modifying factors)    History From: ***  {Limitations to history (Optional):04219}    {Social Determinants Significantly Affecting Health (Optional):25574}    Chief Complaint of ***    This is a  43 y.o. female who presents  ***    PastMedical/Surgical History:      Diagnosis Date    ADHD (attention deficit hyperactivity disorder) 1988    Depression with anxiety     Difficult intubation     airway swelled up    Drug-seeking behavior     Encounter for imaging to screen for metal prior to MRI 06/01/2021    MRI Conditional Med[employer]onic Non-Programmable shunt model#00596 implanted 10/30/2020 at [facility]. Normal Mode. 1.5T or 3.0T.    ESBL (extended spectrum beta-lactamase) producing bacteria infection 11/06/2019    urine    Functional ovarian cysts 2008    rt ovary cyst x 2 yrs.    GERD (gastroesophageal reflux disease) When I was a kid    Headache(784.0)     migraines    History of blood transfusion     at birth    History of kidney stones     History of PCOS     had hysterectomy in 2016    Hydrocephalus (HCC)     Hyperlipidemia     Irritable bowel syndrome 2004    had colonoscopy about 6 yrs ago    Meningitis 07/2018    Neutrophilic leukocytosis     Nicotine dependence     Piercing     nose- pt wll remove for DOS    PONV (postoperative  and extrahepatic biliary ductal dilatation present.  Spleen, pancreas, adrenal glands, left kidney are unremarkable.  3 mm nonobstructing calculus in the inferior right kidney similar to prior examination. GI/Bowel: Stomach and visualized bowel are unremarkable. Peritoneum/Retroperitoneum: No free air, free fluid or adenopathy.  Aorta and IVC are normal in size.   shunt catheter descends along the anterior abdominal wall and extends into the right peritoneal cavity. Bones/Soft Tissues: No acute osseous abnormality.     No evidence of complication status post ERCP/sphincterotomy Mild pneumobilia consistent with procedure Subsegmental and linear atelectasis in the dependent portions of the lungs.     FL ERCP BILIARY AND PANCREATIC S&I    Result Date: 2/7/2024  Radiology exam is complete. No Radiologist dictation. Please follow up with ordering provider.     ERCP    Result Date: 2/7/2024  No dictation      No results found.    MEDICAL DECISION MAKING / ED COURSE:      PROCEDURES:   Procedures    Patient was given:  Medications   ondansetron (ZOFRAN) injection 4 mg (4 mg IntraVENous Given 2/8/24 1733)   morphine (PF) injection 4 mg (4 mg IntraVENous Given 2/8/24 1733)   iopamidol (ISOVUE-370) 76 % injection 75 mL (75 mLs IntraVENous Given 2/8/24 1917)   ondansetron (ZOFRAN) injection 4 mg (4 mg IntraVENous Given 2/8/24 2020)   morphine (PF) injection 2 mg (2 mg IntraVENous Given 2/8/24 2021)       CONSULTS: (Who and What was discussed)  None      Chronic Conditions affecting care:   has a past medical history of ADHD (attention deficit hyperactivity disorder) (1988), Depression with anxiety, Difficult intubation, Drug-seeking behavior, Encounter for imaging to screen for metal prior to MRI (06/01/2021), ESBL (extended spectrum beta-lactamase) producing bacteria infection (11/06/2019), Functional ovarian cysts (2008), GERD (gastroesophageal reflux disease) (When I was a kid), Headache(784.0), History of blood

## 2024-02-09 NOTE — CARE COORDINATION
Care Transitions Initial Follow Up Call    Call within 2 business days of discharge: Yes     Patient: Petrona Green Patient : 1980 MRN: [id number]    Last Discharge Facility       Date Complaint Diagnosis Description Type Department Provider    24 Abdominal Pain Abdominal pain, right upper quadrant ... ED (DISCHARGE) [facility] ED             RARS: Readmission Risk Score: 14.7       Spoke with: Petrona     Recent ERCP    Relief after ERCP  Not eating any actual food  Concern re increased WBC count   Ab pain in RUQ returned yesterday     Did have relief after ERCP- swelling in belly went down    Concerned about return of ab pain?...  throwing up  Has call out to DR Manuel          Discharge department/facility: [facility]    Non-face-to-face services provided:  Obtained and reviewed discharge summary and/or continuity of care documents    Follow Up  No future appointments.    Evonne Jeter RN

## 2024-02-14 DIAGNOSIS — M54.16 LUMBAR BACK PAIN WITH RADICULOPATHY AFFECTING LEFT LOWER EXTREMITY: ICD-10-CM

## 2024-02-14 RX ORDER — GABAPENTIN 400 MG/1
400 CAPSULE ORAL 3 TIMES DAILY
Qty: 90 CAPSULE | Refills: 0 | Status: SHIPPED | OUTPATIENT
Start: 2024-02-14 | End: 2024-08-12

## 2024-02-14 NOTE — TELEPHONE ENCOUNTER
Future Appointments   Date Time Provider Department Center   2/16/2024  2:15 PM Linda Fox APRN [facility] [place] [facility]         Last appt 12/15/23

## 2024-02-14 NOTE — TELEPHONE ENCOUNTER
Pt called, wants to fill her Rx for Gabapentin today because she cannot pick it up tomorrow due to transportation.     Would like someone to call the pharmacy to ok this    Thank you

## 2024-02-16 ENCOUNTER — HOSPITAL ENCOUNTER (EMERGENCY)
Age: 44
Discharge: HOME | End: 2024-02-16
Payer: COMMERCIAL

## 2024-02-16 ENCOUNTER — OFFICE VISIT (OUTPATIENT)
Dept: INTERNAL MEDICINE CLINIC | Age: 44
End: 2024-02-16

## 2024-02-16 ENCOUNTER — APPOINTMENT (OUTPATIENT)
Dept: CT IMAGING | Age: 44
End: 2024-02-16
Payer: COMMERCIAL

## 2024-02-16 ENCOUNTER — APPOINTMENT (OUTPATIENT)
Dept: ULTRASOUND IMAGING | Age: 44
End: 2024-02-16
Payer: COMMERCIAL

## 2024-02-16 ENCOUNTER — CARE COORDINATION (OUTPATIENT)
Dept: CARE COORDINATION | Age: 44
End: 2024-02-16

## 2024-02-16 VITALS
OXYGEN SATURATION: 97 % | WEIGHT: 182.25 LBS | BODY MASS INDEX: 36.81 KG/M2 | DIASTOLIC BLOOD PRESSURE: 86 MMHG | HEART RATE: 100 BPM | TEMPERATURE: 98.5 F | SYSTOLIC BLOOD PRESSURE: 124 MMHG

## 2024-02-16 VITALS
HEART RATE: 94 BPM | TEMPERATURE: 98.6 F | DIASTOLIC BLOOD PRESSURE: 78 MMHG | OXYGEN SATURATION: 100 % | WEIGHT: 181.44 LBS | SYSTOLIC BLOOD PRESSURE: 138 MMHG | RESPIRATION RATE: 20 BRPM | BODY MASS INDEX: 36.65 KG/M2

## 2024-02-16 DIAGNOSIS — R10.9 ABDOMINAL PAIN, UNSPECIFIED ABDOMINAL LOCATION: Primary | ICD-10-CM

## 2024-02-16 DIAGNOSIS — K92.0 COFFEE GROUND EMESIS: ICD-10-CM

## 2024-02-16 DIAGNOSIS — G40.909 SEIZURE DISORDER (HCC): ICD-10-CM

## 2024-02-16 DIAGNOSIS — R10.11 RUQ PAIN: Primary | ICD-10-CM

## 2024-02-16 DIAGNOSIS — E78.5 TYPE 2 DIABETES MELLITUS WITH HYPERLIPIDEMIA (HCC): ICD-10-CM

## 2024-02-16 DIAGNOSIS — R50.9 FEVER, UNSPECIFIED FEVER CAUSE: ICD-10-CM

## 2024-02-16 DIAGNOSIS — R14.0 ABDOMINAL DISTENSION: ICD-10-CM

## 2024-02-16 DIAGNOSIS — E11.69 TYPE 2 DIABETES MELLITUS WITH HYPERLIPIDEMIA (HCC): ICD-10-CM

## 2024-02-16 DIAGNOSIS — Q03.9 CONGENITAL HYDROCEPHALUS (HCC): ICD-10-CM

## 2024-02-16 DIAGNOSIS — D72.829 LEUKOCYTOSIS, UNSPECIFIED TYPE: ICD-10-CM

## 2024-02-16 LAB
ABO + RH BLD: NORMAL
ALBUMIN SERPL-MCNC: 4 G/DL (ref 3.4–5)
ALP SERPL-CCNC: 100 U/L (ref 40–129)
ALT SERPL-CCNC: 16 U/L (ref 10–40)
ANION GAP SERPL CALCULATED.3IONS-SCNC: 13 MMOL/L (ref 3–16)
AST SERPL-CCNC: 27 U/L (ref 15–37)
BASOPHILS # BLD: 0.1 K/UL (ref 0–0.2)
BASOPHILS NFR BLD: 0.7 %
BILIRUB DIRECT SERPL-MCNC: <0.2 MG/DL (ref 0–0.3)
BILIRUB INDIRECT SERPL-MCNC: NORMAL MG/DL (ref 0–1)
BILIRUB SERPL-MCNC: <0.2 MG/DL (ref 0–1)
BILIRUB UR QL STRIP.AUTO: NEGATIVE
BILIRUBIN, POC: NEGATIVE
BLD GP AB SCN SERPL QL: NORMAL
BLOOD URINE, POC: NORMAL
BUN SERPL-MCNC: 11 MG/DL (ref 7–20)
CALCIUM SERPL-MCNC: 8.9 MG/DL (ref 8.3–10.6)
CHLORIDE SERPL-SCNC: 104 MMOL/L (ref 99–110)
CLARITY UR: CLEAR
CLARITY, POC: CLEAR
CO2 SERPL-SCNC: 21 MMOL/L (ref 21–32)
COLOR UR: YELLOW
COLOR, POC: YELLOW
CREAT SERPL-MCNC: <0.5 MG/DL (ref 0.6–1.1)
DEPRECATED RDW RBC AUTO: 14.5 % (ref 12.4–15.4)
EOSINOPHIL # BLD: 0.2 K/UL (ref 0–0.6)
EOSINOPHIL NFR BLD: 1.7 %
GFR SERPLBLD CREATININE-BSD FMLA CKD-EPI: >60 ML/MIN/{1.73_M2}
GLUCOSE SERPL-MCNC: 94 MG/DL (ref 70–99)
GLUCOSE UR STRIP.AUTO-MCNC: NEGATIVE MG/DL
GLUCOSE URINE, POC: NEGATIVE
HCT VFR BLD AUTO: 45.5 % (ref 36–48)
HGB BLD-MCNC: 15 G/DL (ref 12–16)
HGB UR QL STRIP.AUTO: NEGATIVE
INFLUENZA A ANTIBODY: NEGATIVE
INFLUENZA B ANTIBODY: NEGATIVE
KETONES UR STRIP.AUTO-MCNC: NEGATIVE MG/DL
KETONES, POC: NEGATIVE
LEUKOCYTE EST, POC: NEGATIVE
LEUKOCYTE ESTERASE UR QL STRIP.AUTO: NEGATIVE
LIPASE SERPL-CCNC: 27 U/L (ref 13–60)
LYMPHOCYTES # BLD: 2.9 K/UL (ref 1–5.1)
LYMPHOCYTES NFR BLD: 24.8 %
MCH RBC QN AUTO: 28.7 PG (ref 26–34)
MCHC RBC AUTO-ENTMCNC: 33.1 G/DL (ref 31–36)
MCV RBC AUTO: 86.7 FL (ref 80–100)
MONOCYTES # BLD: 0.7 K/UL (ref 0–1.3)
MONOCYTES NFR BLD: 6 %
NEUTROPHILS # BLD: 7.7 K/UL (ref 1.7–7.7)
NEUTROPHILS NFR BLD: 66.8 %
NITRITE UR QL STRIP.AUTO: NEGATIVE
NITRITE, POC: NEGATIVE
PH UR STRIP.AUTO: 5.5 [PH] (ref 5–8)
PH, POC: 5
PLATELET # BLD AUTO: 277 K/UL (ref 135–450)
PMV BLD AUTO: 8.3 FL (ref 5–10.5)
POTASSIUM SERPL-SCNC: 5.1 MMOL/L (ref 3.5–5.1)
PROT SERPL-MCNC: 7.1 G/DL (ref 6.4–8.2)
PROT UR STRIP.AUTO-MCNC: NEGATIVE MG/DL
PROTEIN, POC: NEGATIVE
RBC # BLD AUTO: 5.24 M/UL (ref 4–5.2)
SODIUM SERPL-SCNC: 138 MMOL/L (ref 136–145)
SP GR UR STRIP.AUTO: 1.01 (ref 1–1.03)
SPECIFIC GRAVITY, POC: 1.01
UA COMPLETE W REFLEX CULTURE PNL UR: NORMAL
UA DIPSTICK W REFLEX MICRO PNL UR: NORMAL
URN SPEC COLLECT METH UR: NORMAL
UROBILINOGEN UR STRIP-ACNC: 0.2 E.U./DL
UROBILINOGEN, POC: NEGATIVE
WBC # BLD AUTO: 11.5 K/UL (ref 4–11)

## 2024-02-16 PROCEDURE — 6360000002 HC RX W HCPCS

## 2024-02-16 PROCEDURE — 80076 HEPATIC FUNCTION PANEL: CPT

## 2024-02-16 PROCEDURE — 86901 BLOOD TYPING SEROLOGIC RH(D): CPT

## 2024-02-16 PROCEDURE — 86850 RBC ANTIBODY SCREEN: CPT

## 2024-02-16 PROCEDURE — 80048 BASIC METABOLIC PNL TOTAL CA: CPT

## 2024-02-16 PROCEDURE — 99285 EMERGENCY DEPT VISIT HI MDM: CPT

## 2024-02-16 PROCEDURE — 96375 TX/PRO/DX INJ NEW DRUG ADDON: CPT

## 2024-02-16 PROCEDURE — 96374 THER/PROPH/DIAG INJ IV PUSH: CPT

## 2024-02-16 PROCEDURE — 74177 CT ABD & PELVIS W/CONTRAST: CPT

## 2024-02-16 PROCEDURE — 2580000003 HC RX 258

## 2024-02-16 PROCEDURE — 76705 ECHO EXAM OF ABDOMEN: CPT

## 2024-02-16 PROCEDURE — 6360000004 HC RX CONTRAST MEDICATION

## 2024-02-16 PROCEDURE — 86900 BLOOD TYPING SEROLOGIC ABO: CPT

## 2024-02-16 PROCEDURE — 36415 COLL VENOUS BLD VENIPUNCTURE: CPT

## 2024-02-16 PROCEDURE — 96376 TX/PRO/DX INJ SAME DRUG ADON: CPT

## 2024-02-16 PROCEDURE — 85025 COMPLETE CBC W/AUTO DIFF WBC: CPT

## 2024-02-16 PROCEDURE — 81003 URINALYSIS AUTO W/O SCOPE: CPT

## 2024-02-16 PROCEDURE — 83690 ASSAY OF LIPASE: CPT

## 2024-02-16 RX ORDER — FENTANYL CITRATE 50 UG/ML
25 INJECTION, SOLUTION INTRAMUSCULAR; INTRAVENOUS
Status: DISCONTINUED | OUTPATIENT
Start: 2024-02-16 | End: 2024-02-16 | Stop reason: HOSPADM

## 2024-02-16 RX ORDER — SODIUM CHLORIDE, SODIUM LACTATE, POTASSIUM CHLORIDE, AND CALCIUM CHLORIDE .6; .31; .03; .02 G/100ML; G/100ML; G/100ML; G/100ML
1000 INJECTION, SOLUTION INTRAVENOUS ONCE
Status: COMPLETED | OUTPATIENT
Start: 2024-02-16 | End: 2024-02-16

## 2024-02-16 RX ORDER — ONDANSETRON 2 MG/ML
4 INJECTION INTRAMUSCULAR; INTRAVENOUS EVERY 30 MIN PRN
Status: COMPLETED | OUTPATIENT
Start: 2024-02-16 | End: 2024-02-16

## 2024-02-16 RX ORDER — PANTOPRAZOLE SODIUM 40 MG/1
40 TABLET, DELAYED RELEASE ORAL
Qty: 30 TABLET | Refills: 0 | Status: SHIPPED | OUTPATIENT
Start: 2024-02-16

## 2024-02-16 RX ADMIN — IOPAMIDOL 75 ML: 755 INJECTION, SOLUTION INTRAVENOUS at 19:09

## 2024-02-16 RX ADMIN — SODIUM CHLORIDE, POTASSIUM CHLORIDE, SODIUM LACTATE AND CALCIUM CHLORIDE 1000 ML: 600; 310; 30; 20 INJECTION, SOLUTION INTRAVENOUS at 17:45

## 2024-02-16 RX ADMIN — ONDANSETRON 4 MG: 2 INJECTION INTRAMUSCULAR; INTRAVENOUS at 19:03

## 2024-02-16 RX ADMIN — ONDANSETRON 4 MG: 2 INJECTION INTRAMUSCULAR; INTRAVENOUS at 17:17

## 2024-02-16 RX ADMIN — FENTANYL CITRATE 25 MCG: 50 INJECTION INTRAMUSCULAR; INTRAVENOUS at 17:44

## 2024-02-16 RX ADMIN — FENTANYL CITRATE 25 MCG: 50 INJECTION INTRAMUSCULAR; INTRAVENOUS at 19:02

## 2024-02-16 ASSESSMENT — PAIN DESCRIPTION - FREQUENCY: FREQUENCY: CONTINUOUS

## 2024-02-16 ASSESSMENT — PAIN - FUNCTIONAL ASSESSMENT: PAIN_FUNCTIONAL_ASSESSMENT: 0-10

## 2024-02-16 ASSESSMENT — PAIN DESCRIPTION - PAIN TYPE: TYPE: ACUTE PAIN

## 2024-02-16 ASSESSMENT — PAIN SCALES - GENERAL
PAINLEVEL_OUTOF10: 8
PAINLEVEL_OUTOF10: 8
PAINLEVEL_OUTOF10: 4
PAINLEVEL_OUTOF10: 7

## 2024-02-16 ASSESSMENT — LIFESTYLE VARIABLES: HOW OFTEN DO YOU HAVE A DRINK CONTAINING ALCOHOL: NEVER

## 2024-02-16 ASSESSMENT — PAIN DESCRIPTION - ONSET: ONSET: ON-GOING

## 2024-02-16 ASSESSMENT — PAIN DESCRIPTION - ORIENTATION: ORIENTATION: RIGHT

## 2024-02-16 ASSESSMENT — PAIN DESCRIPTION - LOCATION: LOCATION: ABDOMEN

## 2024-02-16 ASSESSMENT — PAIN DESCRIPTION - DESCRIPTORS: DESCRIPTORS: PRESSURE;SHARP

## 2024-02-16 NOTE — PROGRESS NOTES
further episodes she needs to go to the emergency room.     3. Abdominal distension: Chronic problem with associated right upper quadrant chronic abdominal pain.  - Hepatic Function Panel; Future  - Brain Natriuretic Peptide; Future    4. Leukocytosis, unspecified type  - CBC with Auto Differential; Future    5. Congenital hydrocephalus (HCC): Has  shunt    6. Seizure disorder (HCC): On Lamictal.  Follows with neurology.    7. Type 2 diabetes mellitus with hyperlipidemia (HCC): Diet controlled  - Hemoglobin A1C; Future  -Continue with diet measures, adjust plan accordingly pending A1c  -On statin    8. Fever, unspecified fever cause: Afebrile in clinic, endorses fevers at home.  - POCT Influenza A/B - negative  - COVID-19; Future  - POCT Urinalysis no Micro - bland   - Culture, Urine   - ER precautions advised      Orders Placed This Encounter   Procedures    Culture, Urine    CBC with Auto Differential     Standing Status:   Future     Number of Occurrences:   1     Standing Expiration Date:   2/16/2025    Hepatic Function Panel     Standing Status:   Future     Number of Occurrences:   1     Standing Expiration Date:   2/16/2025    Hemoglobin A1C     Standing Status:   Future     Number of Occurrences:   1     Standing Expiration Date:   2/16/2025    Brain Natriuretic Peptide     Standing Status:   Future     Number of Occurrences:   1     Standing Expiration Date:   2/16/2025    COVID-19     Standing Status:   Future     Number of Occurrences:   1     Standing Expiration Date:   2/16/2025     Scheduling Instructions:      1) Due to current limited availability of the COVID-19 test, tests will be prioritized based on responses to questions above. Testing may be delayed due to volume.            2) Print and instruct patient to adhere to CDC home isolation program. (Link Above)              3) Set up or refer patient for a monitoring program.              4) Have patient sign up for and [facility] (if not

## 2024-02-16 NOTE — ED TRIAGE NOTES
Patient had ERCP last week, c/o R and mid ABD pain since, R ABD distended. States she had \"coffee ground emesis\" last night x2. VSS

## 2024-02-16 NOTE — ED PROVIDER NOTES
[facility] EMERGENCY DEPARTMENT  EMERGENCY DEPARTMENT ENCOUNTER        Pt Name: Petrona Green  MRN: [id number]  Birthdate 1980  Date of evaluation: 2024  Provider: FREDRICK Borjas - CNP  PCP: Linda Fox APRN  Note Started: 4:36 PM EST 24      ALDAIR. I have evaluated this patient.        CHIEF COMPLAINT       Chief Complaint   Patient presents with    Abdominal Pain    Melena     Patient had ERCP last week, c/o R and mid ABD pain since, R ABD distended. States she had \"coffee ground emesis\" last night x2. VSS       HISTORY OF PRESENT ILLNESS: 1 or more Elements     History From: Patient            Chief Complaint: Above    Petrona Green is a 43 y.o. female who presents to ED with concern for upper abdominal pain.  Worse in the right side.  Associated with nausea no vomiting.  History of gallbladder surgery, chronic diarrhea.  ERCP last week.  Prior cholecystectomy.  Nothing makes symptoms better.  Significant list of allergies limits pain medication choice  The patient  reports that she has been smoking cigarettes. She started smoking about 23 years ago. She has a 5.8 pack-year smoking history. She has never used smokeless tobacco. She reports that she does not drink alcohol and does not use drugs.       Nursing Notes were all reviewed and agreed with or any disagreements were addressed in the HPI.    REVIEW OF SYSTEMS :      Review of Systems    Positives and Pertinent negatives as per HPI.     SURGICAL HISTORY     Past Surgical History:   Procedure Laterality Date    ABDOMEN SURGERY N/A 2021    REMOVAL OF ABDOMINAL WALL MASS performed by Bo Marshall MD at [facility] OR    APPENDECTOMY  2003    appendicitis     SECTION  2005    placenta previa    CHOLECYSTECTOMY      COLONOSCOPY  2004    COLONOSCOPY N/A 04/10/2023    COLONOSCOPY performed by Raoul Godwin MD at [facility] ENDOSCOPY    COLONOSCOPY N/A 2023    COLONOSCOPY WITH BIOPSY performed by  Mendel Manuel MD at [facility] ENDOSCOPY    COLPOSCOPY  2004    CSF SHUNT      replaced at age 16    CYSTOSCOPY      stone removal    ERCP N/A 2/7/2024    ERCP SPHINCTER/PAPILLOTOMY performed by Mendel Manuel MD at [facility] ENDOSCOPY    ERCP N/A 2/7/2024    ERCP balloon sweep performed by Mendel Manuel MD at [facility] ENDOSCOPY    HEMORRHOID SURGERY      HERNIA REPAIR Bilateral 1988    inguinal    HERNIA REPAIR  2015    hiatal hernia    HYSTERECTOMY, TOTAL ABDOMINAL (CERVIX REMOVED)  11/09/2016    TAHBSO, adhesions/PCOS    KNEE ARTHROSCOPY Left 01/07/2015    medial meniscectomy, chondroplasty, plica resection    KNEE SURGERY      Im not estefany.    LITHOTRIPSY Bilateral 12/10/2019    OTHER SURGICAL HISTORY  10/19/2016    op lap    UPPER GASTROINTESTINAL ENDOSCOPY N/A 04/10/2023    EGD BIOPSY performed by Raoul Godwin MD at [facility] ENDOSCOPY    UPPER GASTROINTESTINAL ENDOSCOPY N/A 11/22/2023    EGD ESOPHAGOGASTRODUODENOSCOPY ULTRASOUND performed by Mendel Manuel MD at [facility] ENDOSCOPY    UPPER GASTROINTESTINAL ENDOSCOPY N/A 11/22/2023    EGD BIOPSY performed by Mendel Manuel MD at [facility] ENDOSCOPY    VENTRAL HERNIA REPAIR N/A 09/14/2022    LAPAROSCOPIC LYSIS OF ADHESIONS AND ABDOMINAL WALL MASS REMOVAL performed by Bo Marshall MD at [facility] OR    VENTRICULOPERITONEAL SHUNT      multiple revisions, most recent 2015       CURRENTMEDICATIONS       Discharge Medication List as of 2/16/2024  8:25 PM        CONTINUE these medications which have NOT CHANGED    Details   pantoprazole (PROTONIX) 40 MG tablet Take 1 tablet by mouth every morning (before breakfast), Disp-30 tablet, R-0Normal      gabapentin (NEURONTIN) 400 MG capsule Take 1 capsule by mouth 3 times daily for 180 days. Intended supply: 30 days, Disp-90 capsule, R-0Normal      promethazine (PHENERGAN) 25 MG tablet Take 1 tablet by mouth every 6 hours as needed for NauseaHistorical Med      atorvastatin (LIPITOR) 10 MG tablet Take 1 tablet by

## 2024-02-16 NOTE — PATIENT INSTRUCTIONS
Start protonix, take daily on empty stomach  If you have any more coffee ground emesis, need to go to ER. Otherwise, see Dr Manuel as scheduled next week.    Mammogram

## 2024-02-17 LAB
ALBUMIN SERPL-MCNC: 4.6 G/DL (ref 3.4–5)
ALP SERPL-CCNC: 118 U/L (ref 40–129)
ALT SERPL-CCNC: 15 U/L (ref 10–40)
AST SERPL-CCNC: 15 U/L (ref 15–37)
BASOPHILS # BLD: 0.1 K/UL (ref 0–0.2)
BASOPHILS NFR BLD: 0.5 %
BILIRUB DIRECT SERPL-MCNC: <0.2 MG/DL (ref 0–0.3)
BILIRUB INDIRECT SERPL-MCNC: NORMAL MG/DL (ref 0–1)
BILIRUB SERPL-MCNC: <0.2 MG/DL (ref 0–1)
DEPRECATED RDW RBC AUTO: 14.2 % (ref 12.4–15.4)
EOSINOPHIL # BLD: 0.2 K/UL (ref 0–0.6)
EOSINOPHIL NFR BLD: 1.7 %
EST. AVERAGE GLUCOSE BLD GHB EST-MCNC: 137 MG/DL
HBA1C MFR BLD: 6.4 %
HCT VFR BLD AUTO: 45.8 % (ref 36–48)
HGB BLD-MCNC: 15.2 G/DL (ref 12–16)
LYMPHOCYTES # BLD: 2.5 K/UL (ref 1–5.1)
LYMPHOCYTES NFR BLD: 24 %
MCH RBC QN AUTO: 29.1 PG (ref 26–34)
MCHC RBC AUTO-ENTMCNC: 33.3 G/DL (ref 31–36)
MCV RBC AUTO: 87.3 FL (ref 80–100)
MONOCYTES # BLD: 0.7 K/UL (ref 0–1.3)
MONOCYTES NFR BLD: 6.3 %
NEUTROPHILS # BLD: 7 K/UL (ref 1.7–7.7)
NEUTROPHILS NFR BLD: 67.5 %
NT-PROBNP SERPL-MCNC: <36 PG/ML (ref 0–124)
PLATELET # BLD AUTO: 287 K/UL (ref 135–450)
PMV BLD AUTO: 8.9 FL (ref 5–10.5)
PROT SERPL-MCNC: 6.5 G/DL (ref 6.4–8.2)
RBC # BLD AUTO: 5.24 M/UL (ref 4–5.2)
WBC # BLD AUTO: 10.4 K/UL (ref 4–11)

## 2024-02-18 LAB
BACTERIA UR CULT: NORMAL
SARS-COV-2 RNA RESP QL NAA+PROBE: NOT DETECTED

## 2024-02-19 ENCOUNTER — CARE COORDINATION (OUTPATIENT)
Dept: CARE COORDINATION | Age: 44
End: 2024-02-19

## 2024-02-19 NOTE — CARE COORDINATION
Care Transitions Initial Follow Up Call    Call within 2 business days of discharge: Yes     Patient: Petrona Green Patient : 1980 MRN: [id number]    Last Discharge Facility       Date Complaint Diagnosis Description Type Department Provider    24 Abdominal Pain; Melena Abdominal pain, unspecified abdominal location ED (DISCHARGE) [facility] ED             RARS: Readmission Risk Score: 14.7       Spoke with: Petrona    Feels better  No pain  Interested in weight loss due to increasing weight gain.      Discharge department/facility: [facility] ER    Non-face-to-face services provided:  Scheduled appointment with Specialist-      Follow Up  No future appointments.    Evonne Jeter RN

## 2024-02-20 DIAGNOSIS — G40.909 SEIZURE DISORDER (HCC): Primary | ICD-10-CM

## 2024-02-20 ASSESSMENT — ENCOUNTER SYMPTOMS
NAUSEA: 1
ABDOMINAL PAIN: 1
EYE PAIN: 0
COUGH: 0
BACK PAIN: 0
SORE THROAT: 0
SHORTNESS OF BREATH: 0
VOMITING: 1

## 2024-02-20 NOTE — CARE COORDINATION
Call to patient, made aware of response  Reports swelling in hands wondering if she be on a water pill?   Offered to schedule appt , but declined because she just saw her..  Advised low salt diet, elevating    Also Petrona found old medicaid card and will be scheduling w Dr Lg Degroot if she can   Advised to call medicaid re options for insurance as currently shows active w [employer].    Has appt w GI Dr Manuel next week.  Needs to schedule w Gyno per Petrona as well    PLAN   Check w medicaid   Gyno   GI  Neuro- DR arriaza, need referral   Schedule w Linda if swelling persists

## 2024-02-26 ENCOUNTER — TELEPHONE (OUTPATIENT)
Dept: INTERNAL MEDICINE CLINIC | Age: 44
End: 2024-02-26

## 2024-02-26 DIAGNOSIS — R10.9 CHRONIC ABDOMINAL PAIN: Primary | ICD-10-CM

## 2024-02-26 DIAGNOSIS — G89.29 CHRONIC ABDOMINAL PAIN: Primary | ICD-10-CM

## 2024-02-26 NOTE — TELEPHONE ENCOUNTER
Pt called, she is requesting a referral for pain management for her back pain.    Sarah advise    Thank you

## 2024-02-28 ENCOUNTER — CARE COORDINATION (OUTPATIENT)
Dept: CARE COORDINATION | Age: 44
End: 2024-02-28

## 2024-02-28 ENCOUNTER — OFFICE VISIT (OUTPATIENT)
Dept: GYNECOLOGY | Age: 44
End: 2024-02-28
Payer: COMMERCIAL

## 2024-02-28 VITALS — DIASTOLIC BLOOD PRESSURE: 90 MMHG | BODY MASS INDEX: 37.41 KG/M2 | WEIGHT: 185.2 LBS | SYSTOLIC BLOOD PRESSURE: 130 MMHG

## 2024-02-28 DIAGNOSIS — R87.622 LGSIL PAP SMEAR OF VAGINA: Primary | ICD-10-CM

## 2024-02-28 PROCEDURE — 4004F PT TOBACCO SCREEN RCVD TLK: CPT | Performed by: OBSTETRICS & GYNECOLOGY

## 2024-02-28 PROCEDURE — G8484 FLU IMMUNIZE NO ADMIN: HCPCS | Performed by: OBSTETRICS & GYNECOLOGY

## 2024-02-28 PROCEDURE — 3075F SYST BP GE 130 - 139MM HG: CPT | Performed by: OBSTETRICS & GYNECOLOGY

## 2024-02-28 PROCEDURE — G8417 CALC BMI ABV UP PARAM F/U: HCPCS | Performed by: OBSTETRICS & GYNECOLOGY

## 2024-02-28 PROCEDURE — G8427 DOCREV CUR MEDS BY ELIG CLIN: HCPCS | Performed by: OBSTETRICS & GYNECOLOGY

## 2024-02-28 PROCEDURE — 99213 OFFICE O/P EST LOW 20 MIN: CPT | Performed by: OBSTETRICS & GYNECOLOGY

## 2024-02-28 PROCEDURE — 3080F DIAST BP >= 90 MM HG: CPT | Performed by: OBSTETRICS & GYNECOLOGY

## 2024-02-28 NOTE — PROGRESS NOTES
Subjective:      Patient ID: Petrona Green is a 43 y.o. female.    HPI  pts here for repeat pap smear.  No n/v, no bleeding.  H/o hysterectomy.  Feels like her hemorrhoids have returned.    Review of Systems Pertinent review of systems items discussed above.  All others systems items not discussed above were negative.      Objective:   Physical Exam  Af, vss  Ext- no lesions  Meatus- no lesions  Bladder- good support  Vag- no d/c, cuff without lesions, good support  Cx- absent  Pap    Assessment:   H/o LGSIL, hemorrhoids     Plan:   Call with results.  If this pap is normal then ok to f/u in one year.  Will refer to Joanna for hemorrhoids.       Prince Sims MD

## 2024-02-28 NOTE — TELEPHONE ENCOUNTER
Can the referral for pain management be re-faxed to [facility] pain management,  Dr. Dimitris Cortez    Ph: [phone]  Fax: [phone]    She is also requesting a referral for Psychology or Psychiatry for her anxiety, she needs medication.    Thank you
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
## 2024-02-29 ENCOUNTER — OFFICE VISIT (OUTPATIENT)
Dept: INTERNAL MEDICINE CLINIC | Age: 44
End: 2024-02-29
Payer: COMMERCIAL

## 2024-02-29 VITALS
WEIGHT: 184 LBS | SYSTOLIC BLOOD PRESSURE: 132 MMHG | TEMPERATURE: 98.5 F | OXYGEN SATURATION: 97 % | HEART RATE: 110 BPM | DIASTOLIC BLOOD PRESSURE: 86 MMHG | BODY MASS INDEX: 37.16 KG/M2

## 2024-02-29 DIAGNOSIS — G89.29 CHRONIC RIGHT-SIDED LOW BACK PAIN WITH RIGHT-SIDED SCIATICA: ICD-10-CM

## 2024-02-29 DIAGNOSIS — F41.9 ANXIETY: Primary | ICD-10-CM

## 2024-02-29 DIAGNOSIS — R19.7 DIARRHEA, UNSPECIFIED TYPE: ICD-10-CM

## 2024-02-29 DIAGNOSIS — M54.41 CHRONIC RIGHT-SIDED LOW BACK PAIN WITH RIGHT-SIDED SCIATICA: ICD-10-CM

## 2024-02-29 DIAGNOSIS — R00.0 TACHYCARDIA: ICD-10-CM

## 2024-02-29 DIAGNOSIS — S09.90XA TRAUMATIC INJURY OF HEAD, INITIAL ENCOUNTER: ICD-10-CM

## 2024-02-29 DIAGNOSIS — F43.0 STRESS REACTION: ICD-10-CM

## 2024-02-29 LAB — ANNOTATION COMMENT IMP: NORMAL

## 2024-02-29 PROCEDURE — 3075F SYST BP GE 130 - 139MM HG: CPT | Performed by: NURSE PRACTITIONER

## 2024-02-29 PROCEDURE — G8484 FLU IMMUNIZE NO ADMIN: HCPCS | Performed by: NURSE PRACTITIONER

## 2024-02-29 PROCEDURE — 3079F DIAST BP 80-89 MM HG: CPT | Performed by: NURSE PRACTITIONER

## 2024-02-29 PROCEDURE — 4004F PT TOBACCO SCREEN RCVD TLK: CPT | Performed by: NURSE PRACTITIONER

## 2024-02-29 PROCEDURE — G8417 CALC BMI ABV UP PARAM F/U: HCPCS | Performed by: NURSE PRACTITIONER

## 2024-02-29 PROCEDURE — 99214 OFFICE O/P EST MOD 30 MIN: CPT | Performed by: NURSE PRACTITIONER

## 2024-02-29 PROCEDURE — G8427 DOCREV CUR MEDS BY ELIG CLIN: HCPCS | Performed by: NURSE PRACTITIONER

## 2024-02-29 RX ORDER — METHYLPREDNISOLONE 4 MG/1
TABLET ORAL
Qty: 1 KIT | Refills: 0 | Status: SHIPPED | OUTPATIENT
Start: 2024-02-29 | End: 2024-03-06

## 2024-02-29 RX ORDER — LORAZEPAM 0.5 MG/1
0.5 TABLET ORAL DAILY PRN
Qty: 7 TABLET | Refills: 0 | Status: SHIPPED | OUTPATIENT
Start: 2024-02-29 | End: 2024-03-07

## 2024-02-29 RX ORDER — CYCLOBENZAPRINE HCL 5 MG
5 TABLET ORAL NIGHTLY PRN
Qty: 10 TABLET | Refills: 0 | Status: SHIPPED | OUTPATIENT
Start: 2024-02-29 | End: 2024-03-10

## 2024-02-29 ASSESSMENT — ENCOUNTER SYMPTOMS
ABDOMINAL PAIN: 0
VOMITING: 0
NAUSEA: 1
BACK PAIN: 1
DIARRHEA: 1

## 2024-02-29 NOTE — PROGRESS NOTES
mouth at bedtime, Disp: , Rfl:     No current facility-administered medications for this visit.       Standing Status:   Future     Number of Occurrences:   1     Standing Expiration Date:   2/28/2025    Duke Ibarra MD, Orthopedic Surgery (Spine), [facility]     Referral Priority:   Routine     Referral Type:   Eval and Treat     Referral Reason:   Specialty Services Required     Referred to Provider:   Duke Beckman MD     Requested Specialty:   Orthopedic Surgery     Number of Visits Requested:   1    Ambulatory referral to Psychiatry     Referral Priority:   Routine     Referral Type:   Psychiatric     Referral Reason:   Specialty Services Required     Referred to Provider:   Dayron Beckwith, APRN - CNP     Requested Specialty:   Psychiatry     Number of Visits Requested:   1    Ambulatory referral to Psychology     Referral Priority:   Routine     Referral Type:   Psychiatric     Referral Reason:   Specialty Services Required     Referred to Provider:   Preston Molina, PhD     Requested Specialty:   Psychology     Number of Visits Requested:   1    Jose A Prather MD, Cardiology, [place]     Referral Priority:   Routine     Referral Type:   Eval and Treat     Referral Reason:   Specialty Services Required     Referred to Provider:   Jose A Bauman MD     Requested Specialty:   Cardiology     Number of Visits Requested:   1       Return in about 3 months (around 5/29/2024), or if symptoms worsen or fail to improve. OR sooner with questions, concerns, worsening symptoms    FREDRICK SAL  2/29/2024  2:31 PM    Discussed use, benefit, and side effects of prescribed medications.  Barriers to medication compliance addressed.  Discussed all ordered testing and labs. All patient questions answered. Patient agreeable with plan above.     Please note that this chart was generated using dragon dictation software.  Although every effort was made to ensure the accuracy

## 2024-02-29 NOTE — PATIENT INSTRUCTIONS
CT scan of head  Start steroid, muscle relaxer at bedtime as needed  See Dr Beckman   Schedule with psychiatry and psychologist  Ativan daily as needed, will not provide refill on this   Schedule with Dr Bauman

## 2024-03-01 ENCOUNTER — TELEPHONE (OUTPATIENT)
Dept: INTERNAL MEDICINE CLINIC | Age: 44
End: 2024-03-01
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
## 2024-03-01 NOTE — TELEPHONE ENCOUNTER
If she is not having fever or other symptoms of infection she should rest, stay hydrate, eliminate caffeine intake and schedule with cardiologist as discussed

## 2024-03-01 NOTE — TELEPHONE ENCOUNTER
Pt called, her heart rate is elevated, 120 at rest. When she stands is goes to 130. Gets dizzy when she stands, feels a little feverish.    Please advise    Thank you

## 2024-03-04 ENCOUNTER — PREP FOR PROCEDURE (OUTPATIENT)
Dept: SURGERY | Age: 44
End: 2024-03-04

## 2024-03-04 ENCOUNTER — OFFICE VISIT (OUTPATIENT)
Dept: SURGERY | Age: 44
End: 2024-03-04
Payer: COMMERCIAL

## 2024-03-04 VITALS
BODY MASS INDEX: 37.09 KG/M2 | HEIGHT: 59 IN | DIASTOLIC BLOOD PRESSURE: 81 MMHG | WEIGHT: 184 LBS | SYSTOLIC BLOOD PRESSURE: 133 MMHG

## 2024-03-04 DIAGNOSIS — K43.6 INCARCERATED VENTRAL HERNIA: ICD-10-CM

## 2024-03-04 DIAGNOSIS — K43.6 INCARCERATED VENTRAL HERNIA: Primary | ICD-10-CM

## 2024-03-04 LAB
6MAM UR QL: NOT DETECTED
7AMINOCLONAZEPAM UR QL: NOT DETECTED
A-OH ALPRAZ UR QL: NOT DETECTED
ALPHA-OH-MIDAZOLAM, URINE: NOT DETECTED
ALPRAZ UR QL: NOT DETECTED
AMPHET UR QL SCN: NOT DETECTED
ANNOTATION COMMENT IMP: NORMAL
ANNOTATION COMMENT IMP: NORMAL
BARBITURATES UR QL: NEGATIVE
BUPRENORPHINE UR QL: NOT DETECTED
BZE UR QL: NEGATIVE
CARBOXYTHC UR QL: NEGATIVE
CARISOPRODOL UR QL: NEGATIVE
CLONAZEPAM UR QL: NOT DETECTED
CODEINE UR QL: NOT DETECTED
CREAT UR-MCNC: 30.1 MG/DL (ref 20–400)
DIAZEPAM UR QL: NOT DETECTED
ETHYL GLUCURONIDE UR QL: NEGATIVE
FENTANYL UR QL: NOT DETECTED
GABAPENTIN: PRESENT
HYDROCODONE UR QL: NOT DETECTED
HYDROMORPHONE UR QL: NOT DETECTED
LORAZEPAM UR QL: NOT DETECTED
MDA UR QL: NOT DETECTED
MDEA UR QL: NOT DETECTED
MDMA UR QL: NOT DETECTED
MEPERIDINE UR QL: NOT DETECTED
METHADONE UR QL: NEGATIVE
METHAMPHET UR QL: NOT DETECTED
MIDAZOLAM UR QL SCN: NOT DETECTED
MORPHINE UR QL: NOT DETECTED
NALOXONE: NOT DETECTED
NORBUPRENORPHINE UR QL CFM: NOT DETECTED
NORDIAZEPAM UR QL: NOT DETECTED
NORFENTANYL UR QL: NOT DETECTED
NORHYDROCODONE UR QL CFM: NOT DETECTED
NOROXYCODONE UR QL CFM: NOT DETECTED
NOROXYMORPHONE, URINE: NOT DETECTED
OXAZEPAM UR QL: NOT DETECTED
OXYCODONE UR QL: NOT DETECTED
OXYMORPHONE UR QL: NOT DETECTED
PATHOLOGY STUDY: NORMAL
PCP UR QL: NEGATIVE
PHENTERMINE UR QL: NOT DETECTED
PPAA UR QL: NOT DETECTED
PREGABALIN: NOT DETECTED
SERVICE CMNT-IMP: NORMAL
TAPENTADOL UR QL SCN: NOT DETECTED
TAPENTADOL-O-SULFATE, URINE: NOT DETECTED
TEMAZEPAM UR QL: NOT DETECTED
TRAMADOL UR QL: NEGATIVE
ZOLPIDEM UR QL: NOT DETECTED

## 2024-03-04 PROCEDURE — 99214 OFFICE O/P EST MOD 30 MIN: CPT | Performed by: SURGERY

## 2024-03-04 PROCEDURE — G8427 DOCREV CUR MEDS BY ELIG CLIN: HCPCS | Performed by: SURGERY

## 2024-03-04 PROCEDURE — 3079F DIAST BP 80-89 MM HG: CPT | Performed by: SURGERY

## 2024-03-04 PROCEDURE — G8484 FLU IMMUNIZE NO ADMIN: HCPCS | Performed by: SURGERY

## 2024-03-04 PROCEDURE — G8417 CALC BMI ABV UP PARAM F/U: HCPCS | Performed by: SURGERY

## 2024-03-04 PROCEDURE — 3075F SYST BP GE 130 - 139MM HG: CPT | Performed by: SURGERY

## 2024-03-04 PROCEDURE — 4004F PT TOBACCO SCREEN RCVD TLK: CPT | Performed by: SURGERY

## 2024-03-04 ASSESSMENT — ENCOUNTER SYMPTOMS
ABDOMINAL PAIN: 1
ABDOMINAL DISTENTION: 1

## 2024-03-04 NOTE — PATIENT INSTRUCTIONS
3/6/24 arrive 8:35a hernia repair, nothing to eat or drink after midnight, will need  a  to get you home.

## 2024-03-04 NOTE — PROGRESS NOTES
[facility] PRE-OPERATIVE INSTRUCTIONS    Day of Procedure: 3/6               Arrival time:  0835              Surgery time:1005      Take the following medications with a sip of water:  Follow your MD/Surgeons pre-procedure instructions regarding your medications     Do not eat or drink anything after 12:00 midnight prior to your surgery.  This includes water chewing gum, mints and ice chips.   You may brush your teeth and gargle the morning of your surgery, but do not swallow the water     Please see your family doctor/pediatrician for a history and physical and/or concerning medications.   Bring any test results/reports from your physicians office.   If you are under the care of a heart doctor or specialist doctor, please be aware that you may be asked to them for clearance    You may be asked to stop blood thinners such as Coumadin, Plavix, Fragmin, Lovenox, etc., or any anti-inflammatories such as:  Aspirin, Ibuprofen, Advil, Naproxen prior to your surgery.    We also ask that you stop any OTC medications such as fish oil, vitamin E, glucosamine, garlic, Multivitamins, COQ 10, etc.    We ask that you do not smoke 24 hours prior to surgery  We ask that you do not  drink any alcoholic beverages 24 hours prior to surgery     You must make arrangements for a responsible adult to take you home after your surgery.    For your safety you will not be allowed to leave alone or drive yourself home.  Your surgery will be cancelled if you do not have a ride home.     Also for your safety, it is strongly suggested that someone stay with you the first 24 hours after your surgery.     A parent or legal guardian must accompany a child scheduled for surgery and plan to stay at the hospital until the child is discharged.    Please do not bring other children with you.    For your comfort, please wear simple loose fitting clothing to the hospital.  Please do not bring valuables.    Do not wear any make-up or nail  surgery.    C-Difficile admission screening and protocol:       * Admitted with diarrhea?                         [] YES    [x]  NO     *Prior history of C-Diff. In last 3 months? [] YES    [x]  NO     *Antibiotic use in the past 6-8 weeks?      [x]  NO    []  YES                 If yes, which ANTIBIOTIC AND REASON______     *Prior hospitalization or nursing home in the last month? []  YES    [x]  NO    PT had a hx of having MRSA when she had he  shut placed      SAFETY FIRST..call before you fall

## 2024-03-04 NOTE — PROGRESS NOTES
[facility] Pre-Admission Testing Electronic Communication Worksheet for OR/ENDO Procedures        Patient: Petrona Green    DOS: 3/6    Arrival Time: 0835    Surgery Time:1005      Meds to Bed:  [x] YES    []  NO    Transportation Confirmed: [x] YES    []  NO    History and Physical:  [x] YES    []  NO  [] N/A  If yes, please list doctor or Urgent Care and date of H&P:   MD Marshall to do DOS    Additional Clearance(Cardiac, Pulmonary, etc):  [] YES    [x]  NO    Pre-Admission Testing Visit:  [] YES    [x]  NO If no, do labs/testing need to be done DOS?  [] YES    [x]  NO    Medication Reconciliation Complete:  [x] YES    []  NO        Additional Notes:    Patient requesting an abd binder DOS  Patient has sever anxiety about procedures and requesting anxiety meds DOS            Interview Complete: [x] YES    []  NO          Mary Carmen Haas, RN  3:02 PM

## 2024-03-04 NOTE — PROGRESS NOTES
Petrona Green (:  1980) is a 43 y.o. female,Established patient, here for evaluation of the following chief complaint(s):  Surgical Consult (hernia)         ASSESSMENT/PLAN:  1. Incarcerated ventral hernia    Repair with mesh    The risks, benefits and alternatives to the planned procedure were discussed. Patient expressed an understanding and is willing to proceed.           Subjective   SUBJECTIVE/OBJECTIVE:  HPI  Chief Complaint: hernia    Patient presents for follow up evaluation of a hernia. Patient reports symptoms of pain and bulging. Location of symptoms is the umbilicus at the site of a known umbilical hernia. Symptoms were first noted last week after heavy lifting. Previous evaluation includes CT showing a fat containing umbilical hernia. She is now unable to reduce the hernia. Patient has a history of laparoscopy. Will plan following treatment: hernia repair.    CT scan imaging was independently reviewed by me today      Past Medical History:   Diagnosis Date    ADHD (attention deficit hyperactivity disorder)     Depression with anxiety     Difficult intubation     airway swelled up    Drug-seeking behavior     Encounter for imaging to screen for metal prior to MRI 2021    MRI Conditional Medtronic Non-Programmable shunt model#[id number] implanted 10/30/2020 at [facility]. Normal Mode. 1.5T or 3.0T.    ESBL (extended spectrum beta-lactamase) producing bacteria infection 2019    urine    Functional ovarian cysts 2008    rt ovary cyst x 2 yrs.    GERD (gastroesophageal reflux disease) When I was a kid    Headache(784.0)     migraines    History of blood transfusion     at birth    History of kidney stones     History of PCOS     had hysterectomy in 2016    Hydrocephalus (HCC)     Hyperlipidemia     Irritable bowel syndrome 2004    had colonoscopy about 6 yrs ago    Meningitis 2018    Neutrophilic leukocytosis     Nicotine dependence     Piercing     nose- pt wll remove for

## 2024-03-05 ENCOUNTER — ANESTHESIA EVENT (OUTPATIENT)
Dept: OPERATING ROOM | Age: 44
End: 2024-03-05
Payer: COMMERCIAL

## 2024-03-05 RX ORDER — SODIUM CHLORIDE 9 MG/ML
INJECTION, SOLUTION INTRAVENOUS PRN
Status: CANCELLED | OUTPATIENT
Start: 2024-03-05

## 2024-03-05 RX ORDER — SODIUM CHLORIDE 0.9 % (FLUSH) 0.9 %
5-40 SYRINGE (ML) INJECTION PRN
Status: CANCELLED | OUTPATIENT
Start: 2024-03-05

## 2024-03-05 RX ORDER — SODIUM CHLORIDE 0.9 % (FLUSH) 0.9 %
5-40 SYRINGE (ML) INJECTION EVERY 12 HOURS SCHEDULED
Status: CANCELLED | OUTPATIENT
Start: 2024-03-05

## 2024-03-06 ENCOUNTER — ANESTHESIA (OUTPATIENT)
Dept: OPERATING ROOM | Age: 44
End: 2024-03-06
Payer: COMMERCIAL

## 2024-03-06 ENCOUNTER — HOSPITAL ENCOUNTER (OUTPATIENT)
Age: 44
Setting detail: OUTPATIENT SURGERY
Discharge: HOME | DRG: 711 | End: 2024-03-06
Attending: SURGERY | Admitting: SURGERY
Payer: COMMERCIAL

## 2024-03-06 VITALS
BODY MASS INDEX: 36.73 KG/M2 | OXYGEN SATURATION: 94 % | SYSTOLIC BLOOD PRESSURE: 118 MMHG | HEIGHT: 59 IN | WEIGHT: 182.21 LBS | TEMPERATURE: 97.6 F | HEART RATE: 82 BPM | DIASTOLIC BLOOD PRESSURE: 79 MMHG | RESPIRATION RATE: 19 BRPM

## 2024-03-06 DIAGNOSIS — K43.6 INCARCERATED VENTRAL HERNIA: Primary | ICD-10-CM

## 2024-03-06 PROCEDURE — 6370000000 HC RX 637 (ALT 250 FOR IP): Performed by: STUDENT IN AN ORGANIZED HEALTH CARE EDUCATION/TRAINING PROGRAM

## 2024-03-06 PROCEDURE — 7100000001 HC PACU RECOVERY - ADDTL 15 MIN: Performed by: SURGERY

## 2024-03-06 PROCEDURE — 6360000002 HC RX W HCPCS: Performed by: SURGERY

## 2024-03-06 PROCEDURE — C1781 MESH (IMPLANTABLE): HCPCS | Performed by: SURGERY

## 2024-03-06 PROCEDURE — 7100000000 HC PACU RECOVERY - FIRST 15 MIN: Performed by: SURGERY

## 2024-03-06 PROCEDURE — 3600000013 HC SURGERY LEVEL 3 ADDTL 15MIN: Performed by: SURGERY

## 2024-03-06 PROCEDURE — 2709999900 HC NON-CHARGEABLE SUPPLY: Performed by: SURGERY

## 2024-03-06 PROCEDURE — 2500000003 HC RX 250 WO HCPCS

## 2024-03-06 PROCEDURE — 7100000011 HC PHASE II RECOVERY - ADDTL 15 MIN: Performed by: SURGERY

## 2024-03-06 PROCEDURE — 6360000002 HC RX W HCPCS: Performed by: ANESTHESIOLOGY

## 2024-03-06 PROCEDURE — 6360000002 HC RX W HCPCS

## 2024-03-06 PROCEDURE — 49614 RPR AA HRN RCR < 3 NCR/STRN: CPT | Performed by: SURGERY

## 2024-03-06 PROCEDURE — 3700000000 HC ANESTHESIA ATTENDED CARE: Performed by: SURGERY

## 2024-03-06 PROCEDURE — 3700000001 HC ADD 15 MINUTES (ANESTHESIA): Performed by: SURGERY

## 2024-03-06 PROCEDURE — 7100000010 HC PHASE II RECOVERY - FIRST 15 MIN: Performed by: SURGERY

## 2024-03-06 PROCEDURE — 2580000003 HC RX 258: Performed by: SURGERY

## 2024-03-06 PROCEDURE — 3600000003 HC SURGERY LEVEL 3 BASE: Performed by: SURGERY

## 2024-03-06 PROCEDURE — A4217 STERILE WATER/SALINE, 500 ML: HCPCS | Performed by: SURGERY

## 2024-03-06 PROCEDURE — 2580000003 HC RX 258: Performed by: ANESTHESIOLOGY

## 2024-03-06 PROCEDURE — 0WUF0JZ SUPPLEMENT ABDOMINAL WALL WITH SYNTHETIC SUBSTITUTE, OPEN APPROACH: ICD-10-PCS | Performed by: SURGERY

## 2024-03-06 DEVICE — IMPLANTABLE DEVICE: Type: IMPLANTABLE DEVICE | Site: ABDOMEN | Status: FUNCTIONAL

## 2024-03-06 RX ORDER — OXYCODONE HYDROCHLORIDE 5 MG/1
5 TABLET ORAL EVERY 6 HOURS PRN
Qty: 20 TABLET | Refills: 0 | Status: ON HOLD | OUTPATIENT
Start: 2024-03-06 | End: 2024-03-11

## 2024-03-06 RX ORDER — DIPHENHYDRAMINE HYDROCHLORIDE 50 MG/ML
12.5 INJECTION INTRAMUSCULAR; INTRAVENOUS
Status: DISCONTINUED | OUTPATIENT
Start: 2024-03-06 | End: 2024-03-06 | Stop reason: HOSPADM

## 2024-03-06 RX ORDER — SODIUM CHLORIDE 0.9 % (FLUSH) 0.9 %
5-40 SYRINGE (ML) INJECTION EVERY 12 HOURS SCHEDULED
Status: DISCONTINUED | OUTPATIENT
Start: 2024-03-06 | End: 2024-03-06 | Stop reason: HOSPADM

## 2024-03-06 RX ORDER — GLYCOPYRROLATE 0.2 MG/ML
INJECTION INTRAMUSCULAR; INTRAVENOUS PRN
Status: DISCONTINUED | OUTPATIENT
Start: 2024-03-06 | End: 2024-03-06 | Stop reason: SDUPTHER

## 2024-03-06 RX ORDER — MEPERIDINE HYDROCHLORIDE 25 MG/ML
12.5 INJECTION INTRAMUSCULAR; INTRAVENOUS; SUBCUTANEOUS
Status: DISCONTINUED | OUTPATIENT
Start: 2024-03-06 | End: 2024-03-06 | Stop reason: HOSPADM

## 2024-03-06 RX ORDER — MAGNESIUM HYDROXIDE 1200 MG/15ML
LIQUID ORAL CONTINUOUS PRN
Status: COMPLETED | OUTPATIENT
Start: 2024-03-06 | End: 2024-03-06

## 2024-03-06 RX ORDER — OXYCODONE HYDROCHLORIDE 10 MG/1
10 TABLET ORAL
Status: COMPLETED | OUTPATIENT
Start: 2024-03-06 | End: 2024-03-06

## 2024-03-06 RX ORDER — SODIUM CHLORIDE 9 MG/ML
INJECTION, SOLUTION INTRAVENOUS PRN
Status: DISCONTINUED | OUTPATIENT
Start: 2024-03-06 | End: 2024-03-06 | Stop reason: HOSPADM

## 2024-03-06 RX ORDER — ONDANSETRON 2 MG/ML
INJECTION INTRAMUSCULAR; INTRAVENOUS PRN
Status: DISCONTINUED | OUTPATIENT
Start: 2024-03-06 | End: 2024-03-06 | Stop reason: SDUPTHER

## 2024-03-06 RX ORDER — SODIUM CHLORIDE 9 MG/ML
INJECTION, SOLUTION INTRAVENOUS PRN
Status: DISCONTINUED | OUTPATIENT
Start: 2024-03-06 | End: 2024-03-06 | Stop reason: SDUPTHER

## 2024-03-06 RX ORDER — OXYCODONE HYDROCHLORIDE 5 MG/1
5 TABLET ORAL
Status: DISCONTINUED | OUTPATIENT
Start: 2024-03-06 | End: 2024-03-06 | Stop reason: HOSPADM

## 2024-03-06 RX ORDER — FENTANYL CITRATE 50 UG/ML
INJECTION, SOLUTION INTRAMUSCULAR; INTRAVENOUS PRN
Status: DISCONTINUED | OUTPATIENT
Start: 2024-03-06 | End: 2024-03-06 | Stop reason: SDUPTHER

## 2024-03-06 RX ORDER — NALOXONE HYDROCHLORIDE 0.4 MG/ML
INJECTION, SOLUTION INTRAMUSCULAR; INTRAVENOUS; SUBCUTANEOUS PRN
Status: DISCONTINUED | OUTPATIENT
Start: 2024-03-06 | End: 2024-03-06 | Stop reason: HOSPADM

## 2024-03-06 RX ORDER — DEXAMETHASONE SODIUM PHOSPHATE 4 MG/ML
INJECTION, SOLUTION INTRA-ARTICULAR; INTRALESIONAL; INTRAMUSCULAR; INTRAVENOUS; SOFT TISSUE PRN
Status: DISCONTINUED | OUTPATIENT
Start: 2024-03-06 | End: 2024-03-06 | Stop reason: SDUPTHER

## 2024-03-06 RX ORDER — APREPITANT 40 MG/1
40 CAPSULE ORAL ONCE
Status: COMPLETED | OUTPATIENT
Start: 2024-03-06 | End: 2024-03-06

## 2024-03-06 RX ORDER — LORAZEPAM 2 MG/ML
0.5 INJECTION INTRAMUSCULAR
Status: DISCONTINUED | OUTPATIENT
Start: 2024-03-06 | End: 2024-03-06 | Stop reason: HOSPADM

## 2024-03-06 RX ORDER — ROCURONIUM BROMIDE 10 MG/ML
INJECTION, SOLUTION INTRAVENOUS PRN
Status: DISCONTINUED | OUTPATIENT
Start: 2024-03-06 | End: 2024-03-06 | Stop reason: SDUPTHER

## 2024-03-06 RX ORDER — MIDAZOLAM HYDROCHLORIDE 1 MG/ML
INJECTION INTRAMUSCULAR; INTRAVENOUS PRN
Status: DISCONTINUED | OUTPATIENT
Start: 2024-03-06 | End: 2024-03-06 | Stop reason: SDUPTHER

## 2024-03-06 RX ORDER — SODIUM CHLORIDE 0.9 % (FLUSH) 0.9 %
5-40 SYRINGE (ML) INJECTION PRN
Status: DISCONTINUED | OUTPATIENT
Start: 2024-03-06 | End: 2024-03-06 | Stop reason: HOSPADM

## 2024-03-06 RX ORDER — SUCCINYLCHOLINE/SOD CL,ISO/PF 200MG/10ML
SYRINGE (ML) INTRAVENOUS PRN
Status: DISCONTINUED | OUTPATIENT
Start: 2024-03-06 | End: 2024-03-06 | Stop reason: SDUPTHER

## 2024-03-06 RX ORDER — BUPIVACAINE HYDROCHLORIDE 5 MG/ML
INJECTION, SOLUTION EPIDURAL; INTRACAUDAL
Status: COMPLETED | OUTPATIENT
Start: 2024-03-06 | End: 2024-03-06

## 2024-03-06 RX ORDER — LIDOCAINE HYDROCHLORIDE 20 MG/ML
INJECTION, SOLUTION EPIDURAL; INFILTRATION; INTRACAUDAL; PERINEURAL PRN
Status: DISCONTINUED | OUTPATIENT
Start: 2024-03-06 | End: 2024-03-06 | Stop reason: SDUPTHER

## 2024-03-06 RX ORDER — SODIUM CHLORIDE 0.9 % (FLUSH) 0.9 %
5-40 SYRINGE (ML) INJECTION PRN
Status: DISCONTINUED | OUTPATIENT
Start: 2024-03-06 | End: 2024-03-06 | Stop reason: SDUPTHER

## 2024-03-06 RX ORDER — SODIUM CHLORIDE 0.9 % (FLUSH) 0.9 %
5-40 SYRINGE (ML) INJECTION EVERY 12 HOURS SCHEDULED
Status: DISCONTINUED | OUTPATIENT
Start: 2024-03-06 | End: 2024-03-06 | Stop reason: SDUPTHER

## 2024-03-06 RX ORDER — ONDANSETRON 2 MG/ML
4 INJECTION INTRAMUSCULAR; INTRAVENOUS
Status: COMPLETED | OUTPATIENT
Start: 2024-03-06 | End: 2024-03-06

## 2024-03-06 RX ORDER — PROMETHAZINE HYDROCHLORIDE 25 MG/ML
6.25 INJECTION, SOLUTION INTRAMUSCULAR; INTRAVENOUS ONCE
Status: COMPLETED | OUTPATIENT
Start: 2024-03-06 | End: 2024-03-06

## 2024-03-06 RX ORDER — PROPOFOL 10 MG/ML
INJECTION, EMULSION INTRAVENOUS PRN
Status: DISCONTINUED | OUTPATIENT
Start: 2024-03-06 | End: 2024-03-06 | Stop reason: SDUPTHER

## 2024-03-06 RX ORDER — FENTANYL CITRATE 0.05 MG/ML
50 INJECTION, SOLUTION INTRAMUSCULAR; INTRAVENOUS EVERY 5 MIN PRN
Status: DISCONTINUED | OUTPATIENT
Start: 2024-03-06 | End: 2024-03-06 | Stop reason: HOSPADM

## 2024-03-06 RX ADMIN — APREPITANT 40 MG: 40 CAPSULE ORAL at 09:49

## 2024-03-06 RX ADMIN — PROPOFOL 200 MG: 10 INJECTION, EMULSION INTRAVENOUS at 10:18

## 2024-03-06 RX ADMIN — GLYCOPYRROLATE 0.2 MG: 0.2 INJECTION INTRAMUSCULAR; INTRAVENOUS at 10:13

## 2024-03-06 RX ADMIN — LIDOCAINE HYDROCHLORIDE 100 MG: 20 INJECTION, SOLUTION EPIDURAL; INFILTRATION; INTRACAUDAL; PERINEURAL at 10:18

## 2024-03-06 RX ADMIN — FENTANYL CITRATE 50 MCG: 0.05 INJECTION, SOLUTION INTRAMUSCULAR; INTRAVENOUS at 11:56

## 2024-03-06 RX ADMIN — MIDAZOLAM 2 MG: 1 INJECTION INTRAMUSCULAR; INTRAVENOUS at 10:13

## 2024-03-06 RX ADMIN — ROCURONIUM BROMIDE 10 MG: 10 INJECTION INTRAVENOUS at 10:18

## 2024-03-06 RX ADMIN — PROMETHAZINE HYDROCHLORIDE 6.25 MG: 25 INJECTION INTRAMUSCULAR; INTRAVENOUS at 11:55

## 2024-03-06 RX ADMIN — ONDANSETRON 4 MG: 2 INJECTION INTRAMUSCULAR; INTRAVENOUS at 11:19

## 2024-03-06 RX ADMIN — SUGAMMADEX 200 MG: 100 INJECTION, SOLUTION INTRAVENOUS at 10:43

## 2024-03-06 RX ADMIN — ONDANSETRON 4 MG: 2 INJECTION INTRAMUSCULAR; INTRAVENOUS at 10:49

## 2024-03-06 RX ADMIN — FENTANYL CITRATE 50 MCG: 50 INJECTION INTRAMUSCULAR; INTRAVENOUS at 10:17

## 2024-03-06 RX ADMIN — OXYCODONE HYDROCHLORIDE 10 MG: 10 TABLET ORAL at 12:28

## 2024-03-06 RX ADMIN — FENTANYL CITRATE 50 MCG: 0.05 INJECTION, SOLUTION INTRAMUSCULAR; INTRAVENOUS at 11:20

## 2024-03-06 RX ADMIN — SODIUM CHLORIDE: 9 INJECTION, SOLUTION INTRAVENOUS at 09:29

## 2024-03-06 RX ADMIN — DEXAMETHASONE SODIUM PHOSPHATE 10 MG: 4 INJECTION, SOLUTION INTRAMUSCULAR; INTRAVENOUS at 10:24

## 2024-03-06 RX ADMIN — ROCURONIUM BROMIDE 40 MG: 10 INJECTION INTRAVENOUS at 10:24

## 2024-03-06 RX ADMIN — FENTANYL CITRATE 50 MCG: 50 INJECTION INTRAMUSCULAR; INTRAVENOUS at 10:26

## 2024-03-06 RX ADMIN — Medication 140 MG: at 10:18

## 2024-03-06 ASSESSMENT — PAIN DESCRIPTION - ONSET
ONSET: SUDDEN
ONSET: ON-GOING

## 2024-03-06 ASSESSMENT — PAIN DESCRIPTION - ORIENTATION: ORIENTATION: ANTERIOR;INNER;LOWER

## 2024-03-06 ASSESSMENT — ENCOUNTER SYMPTOMS: SHORTNESS OF BREATH: 0

## 2024-03-06 ASSESSMENT — PAIN DESCRIPTION - FREQUENCY
FREQUENCY: CONTINUOUS
FREQUENCY: CONTINUOUS

## 2024-03-06 ASSESSMENT — PAIN SCALES - GENERAL
PAINLEVEL_OUTOF10: 10
PAINLEVEL_OUTOF10: 7
PAINLEVEL_OUTOF10: 8
PAINLEVEL_OUTOF10: 7

## 2024-03-06 ASSESSMENT — PAIN DESCRIPTION - DESCRIPTORS
DESCRIPTORS: TENDER;SORE
DESCRIPTORS: ACHING
DESCRIPTORS: SORE;TENDER

## 2024-03-06 ASSESSMENT — PAIN - FUNCTIONAL ASSESSMENT
PAIN_FUNCTIONAL_ASSESSMENT: 0-10
PAIN_FUNCTIONAL_ASSESSMENT: PREVENTS OR INTERFERES SOME ACTIVE ACTIVITIES AND ADLS
PAIN_FUNCTIONAL_ASSESSMENT: PREVENTS OR INTERFERES SOME ACTIVE ACTIVITIES AND ADLS

## 2024-03-06 ASSESSMENT — LIFESTYLE VARIABLES: SMOKING_STATUS: 1

## 2024-03-06 ASSESSMENT — PAIN DESCRIPTION - PAIN TYPE
TYPE: SURGICAL PAIN
TYPE: SURGICAL PAIN

## 2024-03-06 ASSESSMENT — PAIN DESCRIPTION - LOCATION: LOCATION: ABDOMEN

## 2024-03-06 NOTE — ANESTHESIA POSTPROCEDURE EVALUATION
Department of Anesthesiology  Postprocedure Note    Patient: Petrona Green  MRN: [id number]  YOB: 1980  Date of evaluation: 3/6/2024    Procedure Summary       Date: 03/06/24 Room / Location: [address]    Anesthesia Start: 1013 Anesthesia Stop: 1106    Procedure: INCARCERATED VENTRAL HERNIA REPAIR WITH MESH (Abdomen) Diagnosis:       Incarcerated ventral hernia      (Incarcerated ventral hernia [K43.6])    Surgeons: Bo Marshall MD Responsible Provider: Dimitris Arteaga MD    Anesthesia Type: general ASA Status: 3            Anesthesia Type: No value filed.    Harlan Phase I: Harlan Score: 10    Harlan Phase II: Harlan Score: 10    Anesthesia Post Evaluation    Patient location during evaluation: PACU  Patient participation: complete - patient participated  Level of consciousness: awake and alert  Pain score: 5  Nausea & Vomiting: no nausea  Cardiovascular status: hemodynamically stable  Respiratory status: acceptable  Hydration status: stable  Pain management: adequate    No notable events documented.

## 2024-03-06 NOTE — BRIEF OP NOTE
Brief Postoperative Note      Patient: Petrona Green  YOB: 1980  MRN: [id number]    Date of Procedure: 3/6/2024    Pre-Op Diagnosis Codes:     * Incarcerated ventral hernia [K43.6] recurrent    Post-Op Diagnosis: Same 1 cm       Procedure(s):  INCARCERATED VENTRAL HERNIA REPAIR WITH MESH recurrent 1 cm    Surgeon(s):  Michelle Marshall MD    Assistant:  Surgical Assistant: Georgette Galvez    Anesthesia: General    Estimated Blood Loss (mL): less than 50     Complications: None    Specimens:   * No specimens in log *    Implants:  Implant Name Type Inv. Item Serial No.  Lot No. LRB No. Used Action   PATCH HILDA SM DIA1.7IN CIR W/ STRP [employer] ABSRB - [id number]  PATCH HILDA SM DIA1.7IN CIR W/ STRP [employer] ABSRB  BARD DAVOL-WD [id number] N/A 1 Implanted         Drains: * No LDAs found *    Findings: fat containing hernia at the umbilicus      Electronically signed by MICHELLE MARSHALL MD on 3/6/2024 at 10:50 AM

## 2024-03-06 NOTE — ANESTHESIA PRE PROCEDURE
Department of Anesthesiology  Preprocedure Note       Name:  Petrona Green   Age:  43 y.o.  :  1980                                          MRN:  [id number]         Date:  3/6/2024      Surgeon: Surgeon(s):  Bo Marshall MD    Procedure: Procedure(s):  INCARCERATED VENTRAL HERNIA REPAIR WITH MESH    Medications prior to admission:   Prior to Admission medications    Medication Sig Start Date End Date Taking? Authorizing Provider   cyclobenzaprine (FLEXERIL) 5 MG tablet Take 1 tablet by mouth nightly as needed for Muscle spasms 2/29/24 3/10/24  Linda Fox APRN   LORazepam (ATIVAN) 0.5 MG tablet Take 1 tablet by mouth daily as needed for Anxiety for up to 7 days. Max Daily Amount: 0.5 mg 2/29/24 3/7/24  Linda Fox APRN   gabapentin (NEURONTIN) 400 MG capsule Take 1 capsule by mouth 3 times daily for 180 days. Intended supply: 30 days 24  Berry Duong MD   promethazine (PHENERGAN) 25 MG tablet Take 1 tablet by mouth every 6 hours as needed for Nausea    ProviderAundrea MD   atorvastatin (LIPITOR) 10 MG tablet Take 1 tablet by mouth at bedtime 24   Linda Fox APRN   lamoTRIgine (LAMICTAL) 25 MG tablet Take 4 tablets by mouth at bedtime 3/2/23   Provider, MD Aundrea       Current medications:    Current Facility-Administered Medications   Medication Dose Route Frequency Provider Last Rate Last Admin    sodium chloride flush 0.9 % injection 5-40 mL  5-40 mL IntraVENous 2 times per day Alfie Pino MD        sodium chloride flush 0.9 % injection 5-40 mL  5-40 mL IntraVENous PRN Alfie Pino MD        0.9 % sodium chloride infusion   IntraVENous PRN Alfie Pino MD           Allergies:    Allergies   Allergen Reactions    Bee Venom Shortness Of Breath and Swelling    Bentyl [Dicyclomine Hcl] Shortness Of Breath and Anxiety    Dicyclomine Anxiety and Shortness Of Breath    Ketorolac Tromethamine Hives and Itching       Tolerates PO NSAIDs fine

## 2024-03-06 NOTE — DISCHARGE INSTRUCTIONS
Follow up in 2-3 weeks  Call [phone] for an appointment  Remove dressings in 3-4 days  Ok to shower in AM  No Driving for 3 days. OK to drive at that time if you are not taking any pain medication.

## 2024-03-06 NOTE — H&P
Preoperative History and Physical Update    H&P from 3/4/2024 was reviewed    No changes noted today    PE  Alert and oriented  Umbilical hernia, tender    A/P  Incarcerated, recurrent umbilical hernia  For repair today    The risks, benefits and alternatives to the planned procedure were discussed. Patient expressed an understanding and is willing to proceed.    Electronically signed by MICHELLE MCLEAN MD on 3/6/2024 at 10:00 AM

## 2024-03-07 ENCOUNTER — HOSPITAL ENCOUNTER (INPATIENT)
Age: 44
LOS: 3 days | Discharge: HOME | DRG: 711 | End: 2024-03-11
Attending: EMERGENCY MEDICINE | Admitting: SURGERY
Payer: COMMERCIAL

## 2024-03-07 ENCOUNTER — APPOINTMENT (OUTPATIENT)
Dept: CT IMAGING | Age: 44
DRG: 711 | End: 2024-03-07
Payer: COMMERCIAL

## 2024-03-07 ENCOUNTER — TELEPHONE (OUTPATIENT)
Dept: SURGERY | Age: 44
End: 2024-03-07

## 2024-03-07 ENCOUNTER — CARE COORDINATION (OUTPATIENT)
Dept: CARE COORDINATION | Age: 44
End: 2024-03-07

## 2024-03-07 DIAGNOSIS — R50.82 POSTOPERATIVE FEVER: ICD-10-CM

## 2024-03-07 DIAGNOSIS — R11.2 POSTOPERATIVE NAUSEA AND VOMITING: Primary | ICD-10-CM

## 2024-03-07 DIAGNOSIS — G89.18 POSTOPERATIVE PAIN: ICD-10-CM

## 2024-03-07 DIAGNOSIS — Z98.890 POSTOPERATIVE NAUSEA AND VOMITING: Primary | ICD-10-CM

## 2024-03-07 DIAGNOSIS — K43.6 INCARCERATED VENTRAL HERNIA: ICD-10-CM

## 2024-03-07 LAB
ALBUMIN SERPL-MCNC: 4.4 G/DL (ref 3.4–5)
ALBUMIN/GLOB SERPL: 1.6 {RATIO} (ref 1.1–2.2)
ALP SERPL-CCNC: 112 U/L (ref 40–129)
ALT SERPL-CCNC: 17 U/L (ref 10–40)
ANION GAP SERPL CALCULATED.3IONS-SCNC: 15 MMOL/L (ref 3–16)
AST SERPL-CCNC: 15 U/L (ref 15–37)
BASOPHILS # BLD: 0.1 K/UL (ref 0–0.2)
BASOPHILS NFR BLD: 0.5 %
BILIRUB SERPL-MCNC: 0.3 MG/DL (ref 0–1)
BILIRUB UR QL STRIP.AUTO: NEGATIVE
BUN SERPL-MCNC: 12 MG/DL (ref 7–20)
CALCIUM SERPL-MCNC: 9.2 MG/DL (ref 8.3–10.6)
CHLORIDE SERPL-SCNC: 102 MMOL/L (ref 99–110)
CLARITY UR: CLEAR
CO2 SERPL-SCNC: 23 MMOL/L (ref 21–32)
COLOR UR: YELLOW
CREAT SERPL-MCNC: 0.8 MG/DL (ref 0.6–1.1)
DEPRECATED RDW RBC AUTO: 14.5 % (ref 12.4–15.4)
EOSINOPHIL # BLD: 0 K/UL (ref 0–0.6)
EOSINOPHIL NFR BLD: 0.1 %
GFR SERPLBLD CREATININE-BSD FMLA CKD-EPI: >60 ML/MIN/{1.73_M2}
GLUCOSE SERPL-MCNC: 184 MG/DL (ref 70–99)
GLUCOSE UR STRIP.AUTO-MCNC: NEGATIVE MG/DL
HCT VFR BLD AUTO: 41.2 % (ref 36–48)
HGB BLD-MCNC: 14.1 G/DL (ref 12–16)
HGB UR QL STRIP.AUTO: NEGATIVE
KETONES UR STRIP.AUTO-MCNC: NEGATIVE MG/DL
LACTATE BLDV-SCNC: 2.5 MMOL/L (ref 0.4–2)
LEUKOCYTE ESTERASE UR QL STRIP.AUTO: NEGATIVE
LYMPHOCYTES # BLD: 3.2 K/UL (ref 1–5.1)
LYMPHOCYTES NFR BLD: 13.9 %
MAGNESIUM SERPL-MCNC: 2.3 MG/DL (ref 1.8–2.4)
MCH RBC QN AUTO: 29.1 PG (ref 26–34)
MCHC RBC AUTO-ENTMCNC: 34.3 G/DL (ref 31–36)
MCV RBC AUTO: 84.9 FL (ref 80–100)
MONOCYTES # BLD: 0.8 K/UL (ref 0–1.3)
MONOCYTES NFR BLD: 3.3 %
NEUTROPHILS # BLD: 18.7 K/UL (ref 1.7–7.7)
NEUTROPHILS NFR BLD: 82.2 %
NITRITE UR QL STRIP.AUTO: NEGATIVE
PH UR STRIP.AUTO: 7 [PH] (ref 5–8)
PLATELET # BLD AUTO: 347 K/UL (ref 135–450)
PMV BLD AUTO: 7.8 FL (ref 5–10.5)
POTASSIUM SERPL-SCNC: 3.5 MMOL/L (ref 3.5–5.1)
PROT SERPL-MCNC: 7.2 G/DL (ref 6.4–8.2)
PROT UR STRIP.AUTO-MCNC: NEGATIVE MG/DL
RBC # BLD AUTO: 4.84 M/UL (ref 4–5.2)
SODIUM SERPL-SCNC: 140 MMOL/L (ref 136–145)
SP GR UR STRIP.AUTO: 1.06 (ref 1–1.03)
UA COMPLETE W REFLEX CULTURE PNL UR: NORMAL
UA DIPSTICK W REFLEX MICRO PNL UR: NORMAL
URN SPEC COLLECT METH UR: NORMAL
UROBILINOGEN UR STRIP-ACNC: 0.2 E.U./DL
WBC # BLD AUTO: 22.8 K/UL (ref 4–11)

## 2024-03-07 PROCEDURE — 96375 TX/PRO/DX INJ NEW DRUG ADDON: CPT

## 2024-03-07 PROCEDURE — 6360000002 HC RX W HCPCS: Performed by: EMERGENCY MEDICINE

## 2024-03-07 PROCEDURE — 74177 CT ABD & PELVIS W/CONTRAST: CPT

## 2024-03-07 PROCEDURE — 81003 URINALYSIS AUTO W/O SCOPE: CPT

## 2024-03-07 PROCEDURE — 6360000002 HC RX W HCPCS: Performed by: STUDENT IN AN ORGANIZED HEALTH CARE EDUCATION/TRAINING PROGRAM

## 2024-03-07 PROCEDURE — 99285 EMERGENCY DEPT VISIT HI MDM: CPT

## 2024-03-07 PROCEDURE — G0378 HOSPITAL OBSERVATION PER HR: HCPCS

## 2024-03-07 PROCEDURE — 6370000000 HC RX 637 (ALT 250 FOR IP): Performed by: STUDENT IN AN ORGANIZED HEALTH CARE EDUCATION/TRAINING PROGRAM

## 2024-03-07 PROCEDURE — 96374 THER/PROPH/DIAG INJ IV PUSH: CPT

## 2024-03-07 PROCEDURE — 2580000003 HC RX 258: Performed by: EMERGENCY MEDICINE

## 2024-03-07 PROCEDURE — 96376 TX/PRO/DX INJ SAME DRUG ADON: CPT

## 2024-03-07 PROCEDURE — 80053 COMPREHEN METABOLIC PANEL: CPT

## 2024-03-07 PROCEDURE — 96361 HYDRATE IV INFUSION ADD-ON: CPT

## 2024-03-07 PROCEDURE — 6370000000 HC RX 637 (ALT 250 FOR IP): Performed by: EMERGENCY MEDICINE

## 2024-03-07 PROCEDURE — 83605 ASSAY OF LACTIC ACID: CPT

## 2024-03-07 PROCEDURE — 83735 ASSAY OF MAGNESIUM: CPT

## 2024-03-07 PROCEDURE — 6360000004 HC RX CONTRAST MEDICATION: Performed by: EMERGENCY MEDICINE

## 2024-03-07 PROCEDURE — 36415 COLL VENOUS BLD VENIPUNCTURE: CPT

## 2024-03-07 PROCEDURE — 85025 COMPLETE CBC W/AUTO DIFF WBC: CPT

## 2024-03-07 RX ORDER — CYCLOBENZAPRINE HCL 10 MG
5 TABLET ORAL NIGHTLY PRN
Status: DISCONTINUED | OUTPATIENT
Start: 2024-03-07 | End: 2024-03-11 | Stop reason: HOSPADM

## 2024-03-07 RX ORDER — POTASSIUM CHLORIDE 7.45 MG/ML
10 INJECTION INTRAVENOUS PRN
Status: DISCONTINUED | OUTPATIENT
Start: 2024-03-07 | End: 2024-03-11 | Stop reason: HOSPADM

## 2024-03-07 RX ORDER — SODIUM CHLORIDE 9 MG/ML
INJECTION, SOLUTION INTRAVENOUS PRN
Status: DISCONTINUED | OUTPATIENT
Start: 2024-03-07 | End: 2024-03-11 | Stop reason: HOSPADM

## 2024-03-07 RX ORDER — MORPHINE SULFATE 4 MG/ML
4 INJECTION, SOLUTION INTRAMUSCULAR; INTRAVENOUS
Status: COMPLETED | OUTPATIENT
Start: 2024-03-07 | End: 2024-03-07

## 2024-03-07 RX ORDER — ENOXAPARIN SODIUM 100 MG/ML
40 INJECTION SUBCUTANEOUS DAILY
Status: DISCONTINUED | OUTPATIENT
Start: 2024-03-08 | End: 2024-03-11 | Stop reason: HOSPADM

## 2024-03-07 RX ORDER — PROMETHAZINE HYDROCHLORIDE 25 MG/1
25 TABLET ORAL ONCE
Status: COMPLETED | OUTPATIENT
Start: 2024-03-07 | End: 2024-03-07

## 2024-03-07 RX ORDER — PROCHLORPERAZINE EDISYLATE 5 MG/ML
10 INJECTION INTRAMUSCULAR; INTRAVENOUS EVERY 6 HOURS PRN
Status: DISCONTINUED | OUTPATIENT
Start: 2024-03-07 | End: 2024-03-09

## 2024-03-07 RX ORDER — 0.9 % SODIUM CHLORIDE 0.9 %
1000 INTRAVENOUS SOLUTION INTRAVENOUS ONCE
Status: COMPLETED | OUTPATIENT
Start: 2024-03-07 | End: 2024-03-07

## 2024-03-07 RX ORDER — ONDANSETRON 2 MG/ML
4 INJECTION INTRAMUSCULAR; INTRAVENOUS ONCE
Status: COMPLETED | OUTPATIENT
Start: 2024-03-07 | End: 2024-03-07

## 2024-03-07 RX ORDER — ONDANSETRON 4 MG/1
4 TABLET, ORALLY DISINTEGRATING ORAL EVERY 8 HOURS PRN
Status: DISCONTINUED | OUTPATIENT
Start: 2024-03-07 | End: 2024-03-11 | Stop reason: HOSPADM

## 2024-03-07 RX ORDER — MAGNESIUM SULFATE IN WATER 40 MG/ML
2000 INJECTION, SOLUTION INTRAVENOUS PRN
Status: DISCONTINUED | OUTPATIENT
Start: 2024-03-07 | End: 2024-03-11 | Stop reason: HOSPADM

## 2024-03-07 RX ORDER — ACETAMINOPHEN 500 MG
1000 TABLET ORAL EVERY 8 HOURS PRN
Status: DISCONTINUED | OUTPATIENT
Start: 2024-03-07 | End: 2024-03-11 | Stop reason: HOSPADM

## 2024-03-07 RX ORDER — IBUPROFEN 400 MG/1
400 TABLET ORAL EVERY 8 HOURS PRN
Status: DISCONTINUED | OUTPATIENT
Start: 2024-03-07 | End: 2024-03-11 | Stop reason: HOSPADM

## 2024-03-07 RX ORDER — SODIUM CHLORIDE 0.9 % (FLUSH) 0.9 %
5-40 SYRINGE (ML) INJECTION PRN
Status: DISCONTINUED | OUTPATIENT
Start: 2024-03-07 | End: 2024-03-11 | Stop reason: HOSPADM

## 2024-03-07 RX ORDER — GABAPENTIN 400 MG/1
400 CAPSULE ORAL 3 TIMES DAILY
Status: DISCONTINUED | OUTPATIENT
Start: 2024-03-07 | End: 2024-03-11 | Stop reason: HOSPADM

## 2024-03-07 RX ORDER — ONDANSETRON 2 MG/ML
4 INJECTION INTRAMUSCULAR; INTRAVENOUS EVERY 6 HOURS PRN
Status: DISCONTINUED | OUTPATIENT
Start: 2024-03-07 | End: 2024-03-11 | Stop reason: HOSPADM

## 2024-03-07 RX ORDER — POTASSIUM CHLORIDE 20 MEQ/1
40 TABLET, EXTENDED RELEASE ORAL PRN
Status: DISCONTINUED | OUTPATIENT
Start: 2024-03-07 | End: 2024-03-11 | Stop reason: HOSPADM

## 2024-03-07 RX ORDER — SODIUM CHLORIDE, SODIUM LACTATE, POTASSIUM CHLORIDE, CALCIUM CHLORIDE 600; 310; 30; 20 MG/100ML; MG/100ML; MG/100ML; MG/100ML
INJECTION, SOLUTION INTRAVENOUS CONTINUOUS
Status: DISCONTINUED | OUTPATIENT
Start: 2024-03-07 | End: 2024-03-10

## 2024-03-07 RX ORDER — LAMOTRIGINE 100 MG/1
100 TABLET ORAL NIGHTLY
Status: DISCONTINUED | OUTPATIENT
Start: 2024-03-07 | End: 2024-03-11 | Stop reason: HOSPADM

## 2024-03-07 RX ORDER — OXYCODONE HYDROCHLORIDE 5 MG/1
5 TABLET ORAL EVERY 4 HOURS PRN
Status: DISCONTINUED | OUTPATIENT
Start: 2024-03-07 | End: 2024-03-07

## 2024-03-07 RX ORDER — PANTOPRAZOLE SODIUM 40 MG/1
40 TABLET, DELAYED RELEASE ORAL
Status: DISCONTINUED | OUTPATIENT
Start: 2024-03-08 | End: 2024-03-11 | Stop reason: HOSPADM

## 2024-03-07 RX ORDER — LORAZEPAM 0.5 MG/1
0.5 TABLET ORAL DAILY PRN
Status: DISCONTINUED | OUTPATIENT
Start: 2024-03-07 | End: 2024-03-11 | Stop reason: HOSPADM

## 2024-03-07 RX ORDER — SODIUM CHLORIDE 0.9 % (FLUSH) 0.9 %
5-40 SYRINGE (ML) INJECTION EVERY 12 HOURS SCHEDULED
Status: DISCONTINUED | OUTPATIENT
Start: 2024-03-07 | End: 2024-03-11 | Stop reason: HOSPADM

## 2024-03-07 RX ADMIN — ONDANSETRON 4 MG: 2 INJECTION INTRAMUSCULAR; INTRAVENOUS at 17:55

## 2024-03-07 RX ADMIN — GABAPENTIN 400 MG: 400 CAPSULE ORAL at 23:31

## 2024-03-07 RX ADMIN — MORPHINE SULFATE 4 MG: 4 INJECTION, SOLUTION INTRAMUSCULAR; INTRAVENOUS at 17:55

## 2024-03-07 RX ADMIN — HYDROMORPHONE HYDROCHLORIDE 0.5 MG: 1 INJECTION, SOLUTION INTRAMUSCULAR; INTRAVENOUS; SUBCUTANEOUS at 21:56

## 2024-03-07 RX ADMIN — IBUPROFEN 400 MG: 400 TABLET, FILM COATED ORAL at 23:31

## 2024-03-07 RX ADMIN — MORPHINE SULFATE 4 MG: 4 INJECTION, SOLUTION INTRAMUSCULAR; INTRAVENOUS at 19:19

## 2024-03-07 RX ADMIN — OXYCODONE 5 MG: 5 TABLET ORAL at 20:36

## 2024-03-07 RX ADMIN — PROMETHAZINE HYDROCHLORIDE 25 MG: 25 TABLET ORAL at 17:55

## 2024-03-07 RX ADMIN — SODIUM CHLORIDE 1000 ML: 9 INJECTION, SOLUTION INTRAVENOUS at 17:56

## 2024-03-07 RX ADMIN — ONDANSETRON 4 MG: 2 INJECTION INTRAMUSCULAR; INTRAVENOUS at 20:36

## 2024-03-07 RX ADMIN — IOPAMIDOL 75 ML: 755 INJECTION, SOLUTION INTRAVENOUS at 18:27

## 2024-03-07 RX ADMIN — LORAZEPAM 0.5 MG: 0.5 TABLET ORAL at 23:31

## 2024-03-07 ASSESSMENT — PAIN SCALES - GENERAL
PAINLEVEL_OUTOF10: 10
PAINLEVEL_OUTOF10: 8
PAINLEVEL_OUTOF10: 10
PAINLEVEL_OUTOF10: 10
PAINLEVEL_OUTOF10: 6
PAINLEVEL_OUTOF10: 10
PAINLEVEL_OUTOF10: 6
PAINLEVEL_OUTOF10: 3
PAINLEVEL_OUTOF10: 10
PAINLEVEL_OUTOF10: 8

## 2024-03-07 ASSESSMENT — PAIN DESCRIPTION - LOCATION
LOCATION: ABDOMEN

## 2024-03-07 ASSESSMENT — PAIN DESCRIPTION - FREQUENCY
FREQUENCY: CONTINUOUS
FREQUENCY: INTERMITTENT

## 2024-03-07 ASSESSMENT — PAIN DESCRIPTION - ORIENTATION
ORIENTATION: MID

## 2024-03-07 ASSESSMENT — PAIN DESCRIPTION - DESCRIPTORS
DESCRIPTORS: DISCOMFORT
DESCRIPTORS: SHARP
DESCRIPTORS: SHARP

## 2024-03-07 ASSESSMENT — PAIN DESCRIPTION - PAIN TYPE
TYPE: ACUTE PAIN
TYPE: ACUTE PAIN
TYPE: SURGICAL PAIN

## 2024-03-07 ASSESSMENT — PAIN DESCRIPTION - ONSET
ONSET: ON-GOING
ONSET: ON-GOING

## 2024-03-07 NOTE — TELEPHONE ENCOUNTER
PT states that she had surgery yesterday and she is vomiting a lot.  Also, she states she has a lot of swelling and would like to know if it is ok to put froze peas on it.  Call and advise.

## 2024-03-07 NOTE — CARE COORDINATION
Ambulatory Care Coordination Note  3/7/2024    Patient Current Location:  Home: [address] [place] [address] [place] [address]     ACM contacted the patient by telephone. Verified name and  with patient as identifiers. Provided introduction to self, and explanation of the ACM role.     Challenges to be reviewed by the provider   Additional needs identified to be addressed with provider: Yes  none               Method of communication with provider: chart routing.    ACM: Evonne Jeter, RN    Call to patient   Had ventral hernia repair surgery w mesh yesterday   Toady having ad swelling, fever and vomiting  Pt has been in contact w Dr Marshall, and was asked to go to ER for possible imaging and Fluids due to vomiting.    PLan    Will cont outreach         Offered patient enrollment in the Remote Patient Monitoring (RPM) program for in-home monitoring: Patient declined.    Lab Results       None            Care Coordination Interventions    Referral from Primary Care Provider: No  Suggested Interventions and Community Resources          Goals Addressed    None         Future Appointments   Date Time Provider Department Center   3/12/2024  1:20 PM Duke Beckman MD [facility]THO [facility]   3/22/2024 12:30 PM Preston Molina, PhD [place] [facility]CHOLOG [facility]   3/26/2024 11:00 AM Dayron Beckwith, APRN - CNP  PSYCH [facility]

## 2024-03-07 NOTE — ED TRIAGE NOTES
Pt to ER from home C/o having hernia surgery yesterday with Dr Marshall. Pt states she has been up since 0400 with abdominal pain, nausea, vomiting, swollen/painful abdomen, and fever of 101F. Pt states she called Dr Marshall office and was told to come to ER.

## 2024-03-07 NOTE — TELEPHONE ENCOUNTER
Spoke with patient to notify that message has been reviewed by the on call provider Dr. Agrawal. Patient has c/o vomiting not able to keep pain medications down, swelling, hot to the touch and redness around surgical site. Temperature of 101.0. Advised patient to go to the ER for possible imaging of the swollen area and IV fluids since she has been vomiting. Patient stated understanding and will go to the ER. Writer notified ER staff that patient would be arriving shortly. ER staff took note and stated understanding.      Patient sent photo via [employer] of swelling

## 2024-03-07 NOTE — ED PROVIDER NOTES
[facility] 4W MED SURG    CHIEF COMPLAINT  Post-op Problem (C/o having hernia surgery yesterday with Dr Marshall. Pt states she has been up since 0400 with abdominal pain, nausea, vomiting, swollen/painful abdomen, and fever of 101F. Pt states she called Dr Marshall office and was told to come to ER. )       HISTORY OF PRESENT ILLNESS  Petrona Green is a 43 y.o. female who presents to the ED who is postop day 1 status post hernia repair with Dr. Marshall who presents with nausea, vomiting, abdominal pain, fever of 101 Fahrenheit and abdominal distention.  States she is having bowel movement and flatus.  States she has not been able to keep any fluids down or her pain medicine down.  She is very worried about infection.  Denies dysuria.    I have reviewed the following from the nursing documentation:    Past Medical History:   Diagnosis Date    ADHD (attention deficit hyperactivity disorder) 1988    Depression with anxiety     Difficult intubation     airway swelled up    Drug-seeking behavior     Encounter for imaging to screen for metal prior to MRI 06/01/2021    MRI Conditional Medtronic Non-Programmable shunt model#80306 implanted 10/30/2020 at [facility]. Normal Mode. 1.5T or 3.0T.    ESBL (extended spectrum beta-lactamase) producing bacteria infection 11/06/2019    urine    Functional ovarian cysts 2008    rt ovary cyst x 2 yrs.    GERD (gastroesophageal reflux disease) When I was a kid    Headache(784.0)     migraines    History of blood transfusion     at birth    History of kidney stones     History of PCOS     had hysterectomy in 2016    Hydrocephalus (HCC)     Hyperlipidemia     Irritable bowel syndrome 2004    had colonoscopy about 6 yrs ago    Meningitis 07/2018    MRSA (methicillin resistant Staphylococcus aureus)     in  shunt after surgery    Neutrophilic leukocytosis     Nicotine dependence     Piercing     nose- pt wll remove for DOS    PONV (postoperative nausea and vomiting)     very  Vancomycin Anaphylaxis and Hives    Zosyn [Piperacillin Sod-Tazobactam So] Hives, Shortness Of Breath, Nausea Only and Dizziness or Vertigo    Adhesive Tape Dermatitis     Other reaction(s): Dermatitis    Sulfacetamide Hives    Acetaminophen Anxiety     Patient reports when taking acetaminophen (including Norco/Percocet) she gets severe anxiety when taking this medication.     Butalbital-Apap-Caff-Cod Anxiety    Ceftaroline Rash     Does tolerate cefepime and has had doses before, confirmed 10/9/2022 with pharmacy    Daptomycin Swelling and Rash    Flagyl [Metronidazole] Nausea And Vomiting    Haldol [Haloperidol] Anxiety    Keppra [Levetiracetam] Itching and Rash     Per patient itching and rash.     Ketamine Nausea And Vomiting and Anxiety    Methocarbamol Rash    Nitrofurantoin Nausea And Vomiting     \"projectile vomiting\"    Prochlorperazine Anxiety     Patient tells me she takes phenergan    Reglan [Metoclopramide] Anxiety    Silicone Hives, Swelling and Rash    Tazobactam Nausea And Vomiting and Anxiety         PHYSICAL EXAM  ED Triage Vitals [03/07/24 1655]   BP Temp Temp Source Pulse Respirations SpO2 Height Weight - Scale   (!) 157/85 98.3 °F (36.8 °C) Oral 100 16 100 % 1.499 m (4' 11\") 82.6 kg (182 lb 1.6 oz)     General appearance: Awake and alert. Cooperative. No acute distress.  HEENT: Normocephalic. Atraumatic. Mucous membranes are dry  Neck: Supple.    Heart/Chest: Mild tachycardia. No murmurs.    Vascular: symmetric/intact radial and DP pulses bilaterally  Lungs: Respirations unlabored. CTAB. Good air exchange. Speaking comfortably in full sentences.   Abdomen: Soft.  Diffuse abdominal tenderness, minimal distention, incision clean dry and intact without surrounding erythema, induration or drainage,  No rebound or guarding.   Musculoskeletal: No extremity edema. No deformity.   Skin: Warm and dry. No acute rashes.   Neurological: Alert and oriented. CN II-XII intact. Strength 5/5 bilateral upper and

## 2024-03-07 NOTE — OP NOTE
[facility]          [address] [place] [address]                            OPERATIVE REPORT      PATIENT NAME: CAROLINE MEJÍA                : 1980  MED REC NO: [id number]                      ROOM: OR  ACCOUNT NO: [id number]                       ADMIT DATE: 2024  PROVIDER: Bo Marshall MD      DATE OF PROCEDURE:  2024    SURGEON:  Bo Marshall MD    PREOPERATIVE DIAGNOSIS:  Recurrent incarcerated ventral hernia.    POSTOPERATIVE DIAGNOSIS:  Recurrent incarcerated ventral hernia, measuring 1 cm.    PROCEDURE:  Repair of recurrent incarcerated ventral hernia, measuring 1 cm.    SPECIMEN:  Hernia contents.    ESTIMATED BLOOD LOSS:  Less than 50 mL.    COMPLICATIONS:  None.    DISPOSITION:  To recovery in stable condition.    INDICATION:  The patient is a 43-year-old female with multiple previous surgeries including repair of an umbilical hernia, which was done during a laparoscopy.  That hernia has recurred and now become painful and nonreducible.  The risks, benefits, and alternatives of repair were reviewed and she agreed to proceed.    DESCRIPTION OF PROCEDURE:  The patient was brought to the operating room, placed supine.  Anesthesia delivered and the abdomen prepped and draped in a sterile fashion.  A transverse incision was made at the caudal edge of the umbilicus and dissection carried to the fascia.  We now dissected in the cephalad direction and the hernia was encountered near the umbilical skin.  This contained fatty tissue and we were able to dissect the hernia contents free from the surrounding subcutaneous tissue as well as the umbilical skin.  This contained only fatty tissue and so that was divided at the level of the fascia with cautery and passed off as specimen.  The defect was then inspected and measured 1 cm.  The underside was cleared and a 4.3 cm Ventralex mesh placed deep to the fascia.  That was

## 2024-03-07 NOTE — TELEPHONE ENCOUNTER
PT is running 101.0 temp and her stomach is very swollen.  PT is very concerned regarding this issue and would like to have a return call today.

## 2024-03-08 PROBLEM — G89.18 POST-OP PAIN: Status: ACTIVE | Noted: 2024-03-08

## 2024-03-08 LAB
ANION GAP SERPL CALCULATED.3IONS-SCNC: 12 MMOL/L (ref 3–16)
BASOPHILS # BLD: 0.1 K/UL (ref 0–0.2)
BASOPHILS NFR BLD: 0.9 %
BUN SERPL-MCNC: 13 MG/DL (ref 7–20)
CALCIUM SERPL-MCNC: 8.4 MG/DL (ref 8.3–10.6)
CHLORIDE SERPL-SCNC: 104 MMOL/L (ref 99–110)
CO2 SERPL-SCNC: 21 MMOL/L (ref 21–32)
CREAT SERPL-MCNC: 0.7 MG/DL (ref 0.6–1.1)
DEPRECATED RDW RBC AUTO: 14.5 % (ref 12.4–15.4)
EOSINOPHIL # BLD: 0.1 K/UL (ref 0–0.6)
EOSINOPHIL NFR BLD: 0.8 %
GFR SERPLBLD CREATININE-BSD FMLA CKD-EPI: >60 ML/MIN/{1.73_M2}
GLUCOSE BLD-MCNC: 183 MG/DL (ref 70–99)
GLUCOSE SERPL-MCNC: 102 MG/DL (ref 70–99)
HCT VFR BLD AUTO: 38.5 % (ref 36–48)
HGB BLD-MCNC: 12.9 G/DL (ref 12–16)
LACTATE BLDV-SCNC: 2 MMOL/L (ref 0.4–2)
LYMPHOCYTES # BLD: 4.2 K/UL (ref 1–5.1)
LYMPHOCYTES NFR BLD: 26.7 %
MAGNESIUM SERPL-MCNC: 2.4 MG/DL (ref 1.8–2.4)
MCH RBC QN AUTO: 29.3 PG (ref 26–34)
MCHC RBC AUTO-ENTMCNC: 33.6 G/DL (ref 31–36)
MCV RBC AUTO: 87 FL (ref 80–100)
MONOCYTES # BLD: 1.1 K/UL (ref 0–1.3)
MONOCYTES NFR BLD: 6.8 %
NEUTROPHILS # BLD: 10.1 K/UL (ref 1.7–7.7)
NEUTROPHILS NFR BLD: 64.8 %
PERFORMED ON: ABNORMAL
PHOSPHATE SERPL-MCNC: 4.1 MG/DL (ref 2.5–4.9)
PLATELET # BLD AUTO: 244 K/UL (ref 135–450)
PMV BLD AUTO: 8.5 FL (ref 5–10.5)
POTASSIUM SERPL-SCNC: 3.9 MMOL/L (ref 3.5–5.1)
RBC # BLD AUTO: 4.42 M/UL (ref 4–5.2)
SODIUM SERPL-SCNC: 137 MMOL/L (ref 136–145)
WBC # BLD AUTO: 15.6 K/UL (ref 4–11)

## 2024-03-08 PROCEDURE — 85025 COMPLETE CBC W/AUTO DIFF WBC: CPT

## 2024-03-08 PROCEDURE — 6370000000 HC RX 637 (ALT 250 FOR IP): Performed by: STUDENT IN AN ORGANIZED HEALTH CARE EDUCATION/TRAINING PROGRAM

## 2024-03-08 PROCEDURE — APPNB15 APP NON BILLABLE TIME 0-15 MINS: Performed by: PHYSICIAN ASSISTANT

## 2024-03-08 PROCEDURE — 96372 THER/PROPH/DIAG INJ SC/IM: CPT

## 2024-03-08 PROCEDURE — 2580000003 HC RX 258: Performed by: STUDENT IN AN ORGANIZED HEALTH CARE EDUCATION/TRAINING PROGRAM

## 2024-03-08 PROCEDURE — 83735 ASSAY OF MAGNESIUM: CPT

## 2024-03-08 PROCEDURE — 36415 COLL VENOUS BLD VENIPUNCTURE: CPT

## 2024-03-08 PROCEDURE — 96376 TX/PRO/DX INJ SAME DRUG ADON: CPT

## 2024-03-08 PROCEDURE — APPSS15 APP SPLIT SHARED TIME 0-15 MINUTES: Performed by: PHYSICIAN ASSISTANT

## 2024-03-08 PROCEDURE — 83605 ASSAY OF LACTIC ACID: CPT

## 2024-03-08 PROCEDURE — 6370000000 HC RX 637 (ALT 250 FOR IP): Performed by: SURGERY

## 2024-03-08 PROCEDURE — 94760 N-INVAS EAR/PLS OXIMETRY 1: CPT

## 2024-03-08 PROCEDURE — 6360000002 HC RX W HCPCS: Performed by: STUDENT IN AN ORGANIZED HEALTH CARE EDUCATION/TRAINING PROGRAM

## 2024-03-08 PROCEDURE — 51798 US URINE CAPACITY MEASURE: CPT

## 2024-03-08 PROCEDURE — 1200000000 HC SEMI PRIVATE

## 2024-03-08 PROCEDURE — 80048 BASIC METABOLIC PNL TOTAL CA: CPT

## 2024-03-08 PROCEDURE — 84100 ASSAY OF PHOSPHORUS: CPT

## 2024-03-08 PROCEDURE — 99024 POSTOP FOLLOW-UP VISIT: CPT | Performed by: PHYSICIAN ASSISTANT

## 2024-03-08 RX ORDER — LORAZEPAM 0.5 MG/1
0.5 TABLET ORAL ONCE
Status: COMPLETED | OUTPATIENT
Start: 2024-03-08 | End: 2024-03-08

## 2024-03-08 RX ORDER — SIMETHICONE 80 MG
80 TABLET,CHEWABLE ORAL EVERY 6 HOURS PRN
Status: DISCONTINUED | OUTPATIENT
Start: 2024-03-08 | End: 2024-03-11 | Stop reason: HOSPADM

## 2024-03-08 RX ORDER — TAMSULOSIN HYDROCHLORIDE 0.4 MG/1
0.4 CAPSULE ORAL DAILY
Status: DISCONTINUED | OUTPATIENT
Start: 2024-03-08 | End: 2024-03-11 | Stop reason: HOSPADM

## 2024-03-08 RX ADMIN — ONDANSETRON 4 MG: 2 INJECTION INTRAMUSCULAR; INTRAVENOUS at 12:51

## 2024-03-08 RX ADMIN — IBUPROFEN 400 MG: 400 TABLET, FILM COATED ORAL at 14:00

## 2024-03-08 RX ADMIN — HYDROMORPHONE HYDROCHLORIDE 0.5 MG: 1 INJECTION, SOLUTION INTRAMUSCULAR; INTRAVENOUS; SUBCUTANEOUS at 23:30

## 2024-03-08 RX ADMIN — HYDROMORPHONE HYDROCHLORIDE 0.5 MG: 1 INJECTION, SOLUTION INTRAMUSCULAR; INTRAVENOUS; SUBCUTANEOUS at 00:59

## 2024-03-08 RX ADMIN — PANTOPRAZOLE SODIUM 40 MG: 40 TABLET, DELAYED RELEASE ORAL at 05:47

## 2024-03-08 RX ADMIN — ONDANSETRON 4 MG: 2 INJECTION INTRAMUSCULAR; INTRAVENOUS at 03:07

## 2024-03-08 RX ADMIN — CYCLOBENZAPRINE 5 MG: 10 TABLET, FILM COATED ORAL at 20:47

## 2024-03-08 RX ADMIN — HYDROMORPHONE HYDROCHLORIDE 0.5 MG: 1 INJECTION, SOLUTION INTRAMUSCULAR; INTRAVENOUS; SUBCUTANEOUS at 12:56

## 2024-03-08 RX ADMIN — LORAZEPAM 0.5 MG: 0.5 TABLET ORAL at 21:41

## 2024-03-08 RX ADMIN — GABAPENTIN 400 MG: 400 CAPSULE ORAL at 14:00

## 2024-03-08 RX ADMIN — ONDANSETRON 4 MG: 2 INJECTION INTRAMUSCULAR; INTRAVENOUS at 23:28

## 2024-03-08 RX ADMIN — SODIUM CHLORIDE, POTASSIUM CHLORIDE, SODIUM LACTATE AND CALCIUM CHLORIDE: 600; 310; 30; 20 INJECTION, SOLUTION INTRAVENOUS at 00:41

## 2024-03-08 RX ADMIN — ENOXAPARIN SODIUM 40 MG: 100 INJECTION SUBCUTANEOUS at 08:27

## 2024-03-08 RX ADMIN — GABAPENTIN 400 MG: 400 CAPSULE ORAL at 08:26

## 2024-03-08 RX ADMIN — HYDROMORPHONE HYDROCHLORIDE 0.5 MG: 1 INJECTION, SOLUTION INTRAMUSCULAR; INTRAVENOUS; SUBCUTANEOUS at 07:19

## 2024-03-08 RX ADMIN — SODIUM CHLORIDE, PRESERVATIVE FREE 10 ML: 5 INJECTION INTRAVENOUS at 00:43

## 2024-03-08 RX ADMIN — LORAZEPAM 0.5 MG: 0.5 TABLET ORAL at 08:28

## 2024-03-08 RX ADMIN — HYDROMORPHONE HYDROCHLORIDE 0.5 MG: 1 INJECTION, SOLUTION INTRAMUSCULAR; INTRAVENOUS; SUBCUTANEOUS at 16:39

## 2024-03-08 RX ADMIN — LAMOTRIGINE 100 MG: 100 TABLET ORAL at 20:12

## 2024-03-08 RX ADMIN — SODIUM CHLORIDE, PRESERVATIVE FREE 10 ML: 5 INJECTION INTRAVENOUS at 20:16

## 2024-03-08 RX ADMIN — HYDROMORPHONE HYDROCHLORIDE 0.5 MG: 1 INJECTION, SOLUTION INTRAMUSCULAR; INTRAVENOUS; SUBCUTANEOUS at 20:11

## 2024-03-08 RX ADMIN — GABAPENTIN 400 MG: 400 CAPSULE ORAL at 20:12

## 2024-03-08 RX ADMIN — HYDROMORPHONE HYDROCHLORIDE 0.5 MG: 1 INJECTION, SOLUTION INTRAMUSCULAR; INTRAVENOUS; SUBCUTANEOUS at 04:03

## 2024-03-08 ASSESSMENT — PAIN DESCRIPTION - DESCRIPTORS
DESCRIPTORS: SHARP
DESCRIPTORS: SHARP;SORE
DESCRIPTORS: SHARP
DESCRIPTORS: SORE;SHARP
DESCRIPTORS: SHARP
DESCRIPTORS: SHARP;SHOOTING
DESCRIPTORS: SHARP;SORE
DESCRIPTORS: ACHING;SHARP;SORE

## 2024-03-08 ASSESSMENT — PAIN - FUNCTIONAL ASSESSMENT
PAIN_FUNCTIONAL_ASSESSMENT: ACTIVITIES ARE NOT PREVENTED

## 2024-03-08 ASSESSMENT — PAIN DESCRIPTION - PAIN TYPE
TYPE: SURGICAL PAIN

## 2024-03-08 ASSESSMENT — PAIN DESCRIPTION - LOCATION
LOCATION: ABDOMEN;INCISION
LOCATION: ABDOMEN
LOCATION: ABDOMEN
LOCATION: ABDOMEN;INCISION
LOCATION: ABDOMEN
LOCATION: ABDOMEN;INCISION
LOCATION: ABDOMEN
LOCATION: ABDOMEN
LOCATION: ABDOMEN;INCISION
LOCATION: ABDOMEN;INCISION

## 2024-03-08 ASSESSMENT — PAIN DESCRIPTION - ORIENTATION
ORIENTATION: MID

## 2024-03-08 ASSESSMENT — PAIN SCALES - WONG BAKER
WONGBAKER_NUMERICALRESPONSE: HURTS LITTLE MORE
WONGBAKER_NUMERICALRESPONSE: HURTS A LITTLE BIT
WONGBAKER_NUMERICALRESPONSE: 4
WONGBAKER_NUMERICALRESPONSE: HURTS LITTLE MORE
WONGBAKER_NUMERICALRESPONSE: HURTS LITTLE MORE
WONGBAKER_NUMERICALRESPONSE: 2
WONGBAKER_NUMERICALRESPONSE: HURTS A LITTLE BIT
WONGBAKER_NUMERICALRESPONSE: 2
WONGBAKER_NUMERICALRESPONSE: HURTS LITTLE MORE
WONGBAKER_NUMERICALRESPONSE: 4

## 2024-03-08 ASSESSMENT — PAIN SCALES - GENERAL
PAINLEVEL_OUTOF10: 8
PAINLEVEL_OUTOF10: 8
PAINLEVEL_OUTOF10: 6
PAINLEVEL_OUTOF10: 8
PAINLEVEL_OUTOF10: 8
PAINLEVEL_OUTOF10: 6
PAINLEVEL_OUTOF10: 6
PAINLEVEL_OUTOF10: 9
PAINLEVEL_OUTOF10: 9
PAINLEVEL_OUTOF10: 6
PAINLEVEL_OUTOF10: 10

## 2024-03-08 ASSESSMENT — PAIN DESCRIPTION - FREQUENCY
FREQUENCY: INTERMITTENT
FREQUENCY: INTERMITTENT

## 2024-03-08 ASSESSMENT — PAIN DESCRIPTION - ONSET
ONSET: ON-GOING
ONSET: ON-GOING

## 2024-03-08 NOTE — PLAN OF CARE
Problem: Discharge Planning  Goal: Discharge to home or other facility with appropriate resources  Outcome: Progressing  Flowsheets (Taken 3/7/2024 2310)  Discharge to home or other facility with appropriate resources:   Identify barriers to discharge with patient and caregiver   Identify discharge learning needs (meds, wound care, etc)   Refer to discharge planning if patient needs post-hospital services based on physician order or complex needs related to functional status, cognitive ability or social support system   Arrange for needed discharge resources and transportation as appropriate   Arrange for interpreters to assist at discharge as needed     Problem: Pain  Goal: Verbalizes/displays adequate comfort level or baseline comfort level  Outcome: Progressing  Flowsheets (Taken 3/7/2024 2301)  Verbalizes/displays adequate comfort level or baseline comfort level:   Encourage patient to monitor pain and request assistance   Assess pain using appropriate pain scale   Administer analgesics based on type and severity of pain and evaluate response   Consider cultural and social influences on pain and pain management   Implement non-pharmacological measures as appropriate and evaluate response   Notify Licensed Independent Practitioner if interventions unsuccessful or patient reports new pain     Problem: Safety - Adult  Goal: Free from fall injury  Outcome: Progressing     Problem: ABCDS Injury Assessment  Goal: Absence of physical injury  Outcome: Progressing

## 2024-03-08 NOTE — PROGRESS NOTES
Patient is agitated, Anxious and drowsy throughout the day; complaining of feeling like passing out, not been able to sleep; patient vitals taken; VSS, BP slightly elevated. Educate patient to stay in the bed; Pain medications administer. Bed in lowest position, call light within reach. Will continue to monitor.

## 2024-03-08 NOTE — FLOWSHEET NOTE
Patient admitted from ED via stretcher. A&O X4. Complained of 4/10 sharp pain to ABD incision, PRN Motrin was given per orders. IV was in left knuckle when flushed it started leaking and was removed. New PIV being placed at this time by US guided.

## 2024-03-08 NOTE — PROGRESS NOTES
General and Vascular Surgery                                                           Daily Progress Note                                                             Dallin Ervin PA-C    Pt Name: Petrona Green  Medical Record Number: [id number]  Date of Birth 1980   Today's Date: 3/8/2024    ASSESSMENT/PLAN  S/P (3/6/24): Repair of recurrent incarcerated ventral hernia   Abdominal pain at incision site. +surrounding erythema   Leukocytosis: WBC count 22.8 --> 15.6  Denies any nausea or emesis  +flatulence and BM's  Tolerating clear liquid diet. Patient wants \"crackers\" and normal food. Will advance diet. Take slow  Urinary retention. To get straight cathed   IV antibiotics  IVF  OOB and ambulate as tolerated  EDUCATION  Patient educated about their illness/diagnosis, stated above, and all questions answered. We discussed the importance of nutrition, medications they are taking, and healthy lifestyle.  SUBJECTIVE  Petrona has improved from yesterday. Pain is well controlled. She has no nausea and no vomiting.  She has passed flatus and has had a bowel movement. She is tolerating thin liquids. Current activity is ad martha    OBJECTIVE  VITALS:  height is 1.499 m (4' 11\") and weight is 83.8 kg (184 lb 11.9 oz). Her oral temperature is 98.5 °F (36.9 °C). Her blood pressure is 103/59 (abnormal) and her pulse is 63. Her respiration is 16 and oxygen saturation is 96%. VITALS:  BP (!) 103/59   Pulse 63   Temp 98.5 °F (36.9 °C) (Oral)   Resp 16   Ht 1.499 m (4' 11\")   Wt 83.8 kg (184 lb 11.9 oz)   LMP 11/13/2016   SpO2 96%   BMI 37.31 kg/m²   GENERAL: alert, no distress  LUNGS: clear to ausculation, without wheezes, rales or rhonci  HEART: normal rate and regular rhythm  ABDOMEN: soft, Incisional tenderness. Erythema r incision site. +mild distension   EXTREMITY: no cyanosis, clubbing or edema  I/O last 3 completed shifts:  In: 1020 [P.O.:1020]  Out: -   No

## 2024-03-08 NOTE — PLAN OF CARE
General surgery    Patient chart, labs, and imaging reviewed.  Patient discussed with ED provider via phone.    This is a patient of Dr. Marshall.  She underwent repair of recurrent umbilical hernia yesterday 3/6.  She called the office today with complaint of fever (101), abdominal pain, distention, nausea and intractable vomiting.  She reports that she was unable to maintain any p.o. intake.  She presented to the emergency department for evaluation.    She was found to have a leukocytosis of 22, and a lactic acidosis of 2.5.    No evidence of bowel obstruction or intra-abdominal abnormality on CT of the abdomen and pelvis.  There is some fluid and air at the surgical site, likely normal postoperative changes.    These are likely reactive to recent surgery, in the setting of acute pain, nausea, and vomiting.    Will plan to admit for supportive care  IV fluid resuscitation  Clear liquid diet and will advance as tolerated  Multimodal pain control and antiemetics as needed    Electronically signed by Michael Cordoba MD on 3/7/2024 at 8:43 PM

## 2024-03-08 NOTE — H&P
PATIENT NAME: Petrona Green   YOB: 1980    ADMISSION DATE: 3/7/2024  4:44 PM      TODAY'S DATE: 3/8/2024    CHIEF COMPLAINT:  nausea      HISTORY OF PRESENT ILLNESS:  The patient is a 43 y.o. female  who presents with nausea and fever. She is now POD 2 from repair of umbilical hernia. Noted pain and worsening nausea after returning home. Now reports fever and inability to keep anything down. Presented to ER for evaluation. Noted to have leukocytosis but CT was negative other than expected postop fluid. Admitted for symptom control and hydration.    Past Medical History:        Diagnosis Date    ADHD (attention deficit hyperactivity disorder) 1988    Depression with anxiety     Difficult intubation     airway swelled up    Drug-seeking behavior     Encounter for imaging to screen for metal prior to MRI 06/01/2021    MRI Conditional Medtronic Non-Programmable shunt model#66255 implanted 10/30/2020 at [facility]. Normal Mode. 1.5T or 3.0T.    ESBL (extended spectrum beta-lactamase) producing bacteria infection 11/06/2019    urine    Functional ovarian cysts 2008    rt ovary cyst x 2 yrs.    GERD (gastroesophageal reflux disease) When I was a kid    Headache(784.0)     migraines    History of blood transfusion     at birth    History of kidney stones     History of PCOS     had hysterectomy in 2016    Hydrocephalus (HCC)     Hyperlipidemia     Irritable bowel syndrome 2004    had colonoscopy about 6 yrs ago    Meningitis 07/2018    MRSA (methicillin resistant Staphylococcus aureus)     in  shunt after surgery    Neutrophilic leukocytosis     Nicotine dependence     Piercing     nose- pt wll remove for DOS    PONV (postoperative nausea and vomiting)     very nauseated and sometimes wakes up with a Migraine--happened once after brain surgery    Prediabetes     Primary osteoarthritis of left knee 07/01/2016    S/P cone biopsy of cervix 2004    Scoliosis 1990.s    Seizures (HCC)     twice--Nov

## 2024-03-08 NOTE — PROGRESS NOTES
4 Eyes Skin Assessment     NAME:  Petrona Green  YOB: 1980  MEDICAL RECORD NUMBER:  [id number]    The patient is being assessed for  Admission    I agree that at least one RN has performed a thorough Head to Toe Skin Assessment on the patient. ALL assessment sites listed below have been assessed.      Areas assessed by both nurses:    Head, Face, Ears, Shoulders, Back, Chest, Arms, Elbows, Hands, Sacrum. Buttock, Coccyx, Ischium, and Legs. Feet and Heels        Does the Patient have a Wound? No noted wound(s)       Noel Prevention initiated by RN: No  Wound Care Orders initiated by RN: No    Pressure Injury (Stage 3,4, Unstageable, DTI, NWPT, and Complex wounds) if present, place Wound referral order by RN under : No    New Ostomies, if present place, Ostomy referral order under : No     Nurse 1 eSignature: Electronically signed by Violetta Read RN on 3/8/24 at 12:31 AM EST    **SHARE this note so that the co-signing nurse can place an eSignature**    Nurse 2 eSignature: Electronically signed by Katie Haley RN on 3/8/24 at 3:00 AM EST

## 2024-03-08 NOTE — PROGRESS NOTES
Patient complaining of of urge to urinate but not been able to urinate; Distended abdomen and complaining of pain. Notified Dr. Marshall office about the situations; Awaiting orders. Will continue to monitor.

## 2024-03-08 NOTE — CARE COORDINATION
Discharge Planning:      (CM) reviewed the patient's chart to assess needs. Patient's Readmission Risk Score is not determined as pt is in Observation status  . Patient's medical insurance is  Payor: [facility] / Plan: [facility] MEDICAID / Product Type: *No Product type* / .  Patient's PCP is Linda Fox APRN .  No needs anticipated, at this time. CM team to follow. Staff to inform CM if additional discharge needs arise.    Pts preferred pharmacy is   [facility] [id number] - [place] - [address]  - P [phone] - F [phone]  [address]   [facility] [id number]  Phone: [phone] Fax: [phone]    Please consult SW/Cm if a d/c need should arise.   TRACE Cason  [phone]  Electronically signed by TRACE Das on 3/8/2024 at 8:42 AM

## 2024-03-08 NOTE — PROGRESS NOTES
Notified Attending of patient not been able to urinate. Received an order for straight cath; Protocol followed and straight cath patient with an output of 700 ML. Will continue to monitor.

## 2024-03-08 NOTE — PLAN OF CARE
discharge  Outcome: Progressing  Goal: Absence of infection during hospitalization  Outcome: Progressing  Goal: Absence of fever/infection during anticipated neutropenic period  Outcome: Progressing

## 2024-03-08 NOTE — PROGRESS NOTES
Medication Reconciliation    List of medications patient is currently taking is complete.     Source of information: 1. Conversation with patient at bedside                                      2. EPIC records         Notes regarding home medications:   1. Patient confirmed med list including lamotrigine 100mg nightly      Praveen Syed [facility]   3/8/2024  9:34 AM

## 2024-03-09 PROBLEM — R50.82 POSTOPERATIVE FEVER: Status: ACTIVE | Noted: 2024-03-09

## 2024-03-09 LAB
ANION GAP SERPL CALCULATED.3IONS-SCNC: 8 MMOL/L (ref 3–16)
BASOPHILS # BLD: 0.1 K/UL (ref 0–0.2)
BASOPHILS NFR BLD: 1.1 %
BUN SERPL-MCNC: 10 MG/DL (ref 7–20)
CALCIUM SERPL-MCNC: 8.2 MG/DL (ref 8.3–10.6)
CHLORIDE SERPL-SCNC: 102 MMOL/L (ref 99–110)
CO2 SERPL-SCNC: 28 MMOL/L (ref 21–32)
CREAT SERPL-MCNC: 0.9 MG/DL (ref 0.6–1.1)
DEPRECATED RDW RBC AUTO: 14.4 % (ref 12.4–15.4)
EOSINOPHIL # BLD: 0.2 K/UL (ref 0–0.6)
EOSINOPHIL NFR BLD: 1.7 %
GFR SERPLBLD CREATININE-BSD FMLA CKD-EPI: >60 ML/MIN/{1.73_M2}
GLUCOSE SERPL-MCNC: 161 MG/DL (ref 70–99)
HCT VFR BLD AUTO: 34.9 % (ref 36–48)
HGB BLD-MCNC: 11.9 G/DL (ref 12–16)
HPV HR 12 DNA SPEC QL NAA+PROBE: DETECTED
HPV16 DNA SPEC QL NAA+PROBE: NOT DETECTED
HPV16+18+H RISK 12 DNA SPEC-IMP: ABNORMAL
HPV18 DNA SPEC QL NAA+PROBE: NOT DETECTED
LYMPHOCYTES # BLD: 2.9 K/UL (ref 1–5.1)
LYMPHOCYTES NFR BLD: 27.7 %
MAGNESIUM SERPL-MCNC: 1.8 MG/DL (ref 1.8–2.4)
MCH RBC QN AUTO: 29.4 PG (ref 26–34)
MCHC RBC AUTO-ENTMCNC: 34 G/DL (ref 31–36)
MCV RBC AUTO: 86.6 FL (ref 80–100)
MONOCYTES # BLD: 0.7 K/UL (ref 0–1.3)
MONOCYTES NFR BLD: 6.5 %
NEUTROPHILS # BLD: 6.5 K/UL (ref 1.7–7.7)
NEUTROPHILS NFR BLD: 63 %
PHOSPHATE SERPL-MCNC: 4.3 MG/DL (ref 2.5–4.9)
PLATELET # BLD AUTO: 221 K/UL (ref 135–450)
PMV BLD AUTO: 7.6 FL (ref 5–10.5)
POTASSIUM SERPL-SCNC: 3.1 MMOL/L (ref 3.5–5.1)
RBC # BLD AUTO: 4.03 M/UL (ref 4–5.2)
SODIUM SERPL-SCNC: 138 MMOL/L (ref 136–145)
WBC # BLD AUTO: 10.3 K/UL (ref 4–11)

## 2024-03-09 PROCEDURE — 36415 COLL VENOUS BLD VENIPUNCTURE: CPT

## 2024-03-09 PROCEDURE — 2580000003 HC RX 258: Performed by: SURGERY

## 2024-03-09 PROCEDURE — 6370000000 HC RX 637 (ALT 250 FOR IP): Performed by: STUDENT IN AN ORGANIZED HEALTH CARE EDUCATION/TRAINING PROGRAM

## 2024-03-09 PROCEDURE — 6370000000 HC RX 637 (ALT 250 FOR IP): Performed by: INTERNAL MEDICINE

## 2024-03-09 PROCEDURE — 87040 BLOOD CULTURE FOR BACTERIA: CPT

## 2024-03-09 PROCEDURE — 2500000003 HC RX 250 WO HCPCS: Performed by: SURGERY

## 2024-03-09 PROCEDURE — 6370000000 HC RX 637 (ALT 250 FOR IP): Performed by: SURGERY

## 2024-03-09 PROCEDURE — 83735 ASSAY OF MAGNESIUM: CPT

## 2024-03-09 PROCEDURE — 80048 BASIC METABOLIC PNL TOTAL CA: CPT

## 2024-03-09 PROCEDURE — 85025 COMPLETE CBC W/AUTO DIFF WBC: CPT

## 2024-03-09 PROCEDURE — 02HV33Z INSERTION OF INFUSION DEVICE INTO SUPERIOR VENA CAVA, PERCUTANEOUS APPROACH: ICD-10-PCS | Performed by: SURGERY

## 2024-03-09 PROCEDURE — 6360000002 HC RX W HCPCS: Performed by: SURGERY

## 2024-03-09 PROCEDURE — 94760 N-INVAS EAR/PLS OXIMETRY 1: CPT

## 2024-03-09 PROCEDURE — C1751 CATH, INF, PER/CENT/MIDLINE: HCPCS

## 2024-03-09 PROCEDURE — 6360000002 HC RX W HCPCS: Performed by: STUDENT IN AN ORGANIZED HEALTH CARE EDUCATION/TRAINING PROGRAM

## 2024-03-09 PROCEDURE — 84100 ASSAY OF PHOSPHORUS: CPT

## 2024-03-09 PROCEDURE — 99223 1ST HOSP IP/OBS HIGH 75: CPT | Performed by: INTERNAL MEDICINE

## 2024-03-09 PROCEDURE — 2580000003 HC RX 258: Performed by: STUDENT IN AN ORGANIZED HEALTH CARE EDUCATION/TRAINING PROGRAM

## 2024-03-09 PROCEDURE — 1200000000 HC SEMI PRIVATE

## 2024-03-09 PROCEDURE — 36569 INSJ PICC 5 YR+ W/O IMAGING: CPT

## 2024-03-09 PROCEDURE — 99231 SBSQ HOSP IP/OBS SF/LOW 25: CPT | Performed by: SURGERY

## 2024-03-09 PROCEDURE — 87641 MR-STAPH DNA AMP PROBE: CPT

## 2024-03-09 RX ORDER — SODIUM CHLORIDE 0.9 % (FLUSH) 0.9 %
5-40 SYRINGE (ML) INJECTION EVERY 12 HOURS SCHEDULED
Status: DISCONTINUED | OUTPATIENT
Start: 2024-03-09 | End: 2024-03-11 | Stop reason: HOSPADM

## 2024-03-09 RX ORDER — DIPHENHYDRAMINE HCL 25 MG
25 TABLET ORAL EVERY 6 HOURS PRN
Status: DISCONTINUED | OUTPATIENT
Start: 2024-03-09 | End: 2024-03-11 | Stop reason: HOSPADM

## 2024-03-09 RX ORDER — LIDOCAINE HYDROCHLORIDE 10 MG/ML
5 INJECTION, SOLUTION EPIDURAL; INFILTRATION; INTRACAUDAL; PERINEURAL ONCE
Status: COMPLETED | OUTPATIENT
Start: 2024-03-09 | End: 2024-03-09

## 2024-03-09 RX ORDER — CLINDAMYCIN PHOSPHATE 900 MG/50ML
900 INJECTION, SOLUTION INTRAVENOUS EVERY 8 HOURS
Status: DISCONTINUED | OUTPATIENT
Start: 2024-03-09 | End: 2024-03-11 | Stop reason: HOSPADM

## 2024-03-09 RX ORDER — SODIUM CHLORIDE 0.9 % (FLUSH) 0.9 %
5-40 SYRINGE (ML) INJECTION PRN
Status: DISCONTINUED | OUTPATIENT
Start: 2024-03-09 | End: 2024-03-11 | Stop reason: HOSPADM

## 2024-03-09 RX ORDER — SODIUM CHLORIDE 9 MG/ML
25 INJECTION, SOLUTION INTRAVENOUS PRN
Status: DISCONTINUED | OUTPATIENT
Start: 2024-03-09 | End: 2024-03-11 | Stop reason: HOSPADM

## 2024-03-09 RX ORDER — SENNA AND DOCUSATE SODIUM 50; 8.6 MG/1; MG/1
1 TABLET, FILM COATED ORAL 2 TIMES DAILY
Status: DISCONTINUED | OUTPATIENT
Start: 2024-03-09 | End: 2024-03-11 | Stop reason: HOSPADM

## 2024-03-09 RX ADMIN — GABAPENTIN 400 MG: 400 CAPSULE ORAL at 21:07

## 2024-03-09 RX ADMIN — DIPHENHYDRAMINE HCL 25 MG: 25 TABLET ORAL at 21:07

## 2024-03-09 RX ADMIN — HYDROMORPHONE HYDROCHLORIDE 0.5 MG: 1 INJECTION, SOLUTION INTRAMUSCULAR; INTRAVENOUS; SUBCUTANEOUS at 09:57

## 2024-03-09 RX ADMIN — HYDROMORPHONE HYDROCHLORIDE 0.5 MG: 1 INJECTION, SOLUTION INTRAMUSCULAR; INTRAVENOUS; SUBCUTANEOUS at 06:05

## 2024-03-09 RX ADMIN — ONDANSETRON 4 MG: 2 INJECTION INTRAMUSCULAR; INTRAVENOUS at 11:21

## 2024-03-09 RX ADMIN — ONDANSETRON 4 MG: 2 INJECTION INTRAMUSCULAR; INTRAVENOUS at 06:04

## 2024-03-09 RX ADMIN — CLINDAMYCIN PHOSPHATE 900 MG: 900 INJECTION, SOLUTION INTRAVENOUS at 10:11

## 2024-03-09 RX ADMIN — LIDOCAINE HYDROCHLORIDE 5 ML: 10 INJECTION, SOLUTION EPIDURAL; INFILTRATION; INTRACAUDAL; PERINEURAL at 14:57

## 2024-03-09 RX ADMIN — CLINDAMYCIN PHOSPHATE 900 MG: 900 INJECTION, SOLUTION INTRAVENOUS at 17:55

## 2024-03-09 RX ADMIN — LORAZEPAM 0.5 MG: 0.5 TABLET ORAL at 19:44

## 2024-03-09 RX ADMIN — SODIUM CHLORIDE, PRESERVATIVE FREE 10 ML: 5 INJECTION INTRAVENOUS at 21:45

## 2024-03-09 RX ADMIN — IBUPROFEN 400 MG: 400 TABLET, FILM COATED ORAL at 11:21

## 2024-03-09 RX ADMIN — LAMOTRIGINE 100 MG: 100 TABLET ORAL at 21:07

## 2024-03-09 RX ADMIN — GABAPENTIN 400 MG: 400 CAPSULE ORAL at 09:56

## 2024-03-09 RX ADMIN — ENOXAPARIN SODIUM 40 MG: 100 INJECTION SUBCUTANEOUS at 09:56

## 2024-03-09 RX ADMIN — SODIUM CHLORIDE, POTASSIUM CHLORIDE, SODIUM LACTATE AND CALCIUM CHLORIDE: 600; 310; 30; 20 INJECTION, SOLUTION INTRAVENOUS at 17:48

## 2024-03-09 RX ADMIN — HYDROMORPHONE HYDROCHLORIDE 0.5 MG: 1 INJECTION, SOLUTION INTRAMUSCULAR; INTRAVENOUS; SUBCUTANEOUS at 02:31

## 2024-03-09 RX ADMIN — PANTOPRAZOLE SODIUM 40 MG: 40 TABLET, DELAYED RELEASE ORAL at 06:11

## 2024-03-09 RX ADMIN — SODIUM CHLORIDE, PRESERVATIVE FREE 10 ML: 5 INJECTION INTRAVENOUS at 21:10

## 2024-03-09 RX ADMIN — SODIUM CHLORIDE, PRESERVATIVE FREE 10 ML: 5 INJECTION INTRAVENOUS at 21:08

## 2024-03-09 RX ADMIN — GABAPENTIN 400 MG: 400 CAPSULE ORAL at 13:43

## 2024-03-09 RX ADMIN — VANCOMYCIN HYDROCHLORIDE 1500 MG: 1.5 INJECTION, POWDER, LYOPHILIZED, FOR SOLUTION INTRAVENOUS at 17:40

## 2024-03-09 RX ADMIN — HYDROMORPHONE HYDROCHLORIDE 0.5 MG: 1 INJECTION, SOLUTION INTRAMUSCULAR; INTRAVENOUS; SUBCUTANEOUS at 21:07

## 2024-03-09 RX ADMIN — HYDROMORPHONE HYDROCHLORIDE 0.5 MG: 1 INJECTION, SOLUTION INTRAMUSCULAR; INTRAVENOUS; SUBCUTANEOUS at 13:11

## 2024-03-09 RX ADMIN — SENNOSIDES AND DOCUSATE SODIUM 1 TABLET: 8.6; 5 TABLET ORAL at 21:07

## 2024-03-09 RX ADMIN — HYDROMORPHONE HYDROCHLORIDE 0.5 MG: 1 INJECTION, SOLUTION INTRAMUSCULAR; INTRAVENOUS; SUBCUTANEOUS at 17:48

## 2024-03-09 RX ADMIN — Medication 12.5 MG: at 02:56

## 2024-03-09 RX ADMIN — SENNOSIDES AND DOCUSATE SODIUM 1 TABLET: 8.6; 5 TABLET ORAL at 13:43

## 2024-03-09 ASSESSMENT — PAIN SCALES - GENERAL
PAINLEVEL_OUTOF10: 7
PAINLEVEL_OUTOF10: 9
PAINLEVEL_OUTOF10: 8
PAINLEVEL_OUTOF10: 9

## 2024-03-09 ASSESSMENT — PAIN DESCRIPTION - LOCATION
LOCATION: ABDOMEN
LOCATION: ABDOMEN
LOCATION: LEG;ABDOMEN
LOCATION: INCISION
LOCATION: ABDOMEN

## 2024-03-09 ASSESSMENT — PAIN DESCRIPTION - DESCRIPTORS
DESCRIPTORS: SHARP;STABBING
DESCRIPTORS: SHARP;BURNING
DESCRIPTORS: SHARP;PRESSURE;HEAVINESS
DESCRIPTORS: ACHING;SHARP

## 2024-03-09 ASSESSMENT — PAIN - FUNCTIONAL ASSESSMENT: PAIN_FUNCTIONAL_ASSESSMENT: ACTIVITIES ARE NOT PREVENTED

## 2024-03-09 ASSESSMENT — PAIN DESCRIPTION - ORIENTATION
ORIENTATION: LEFT;RIGHT;MID
ORIENTATION: RIGHT;LEFT;MID
ORIENTATION: RIGHT
ORIENTATION: MID
ORIENTATION: MID

## 2024-03-09 ASSESSMENT — PAIN DESCRIPTION - FREQUENCY: FREQUENCY: INTERMITTENT

## 2024-03-09 ASSESSMENT — PAIN DESCRIPTION - PAIN TYPE: TYPE: ACUTE PAIN;SURGICAL PAIN

## 2024-03-09 NOTE — PROGRESS NOTES
Patient complaining of green drainage at the incision site and possible dehiscence to abdominal incision.   Dry old scant drainage noted, dressing reinforced, ice applied.

## 2024-03-09 NOTE — PROGRESS NOTES
This RN responded to bed alarm, Patient attempting to ambulate to bathroom with silva still connect to bed, patient states \" I don't even know why they have this alarm on.\" Patient educated. Patient tearful and agitated.  Not receptive to education.

## 2024-03-09 NOTE — PROGRESS NOTES
Patient was able to void 300 ml without difficulty after removal of silva catheter. Patient denies any pain or discomfort with urination.

## 2024-03-09 NOTE — PROGRESS NOTES
Clinical Pharmacy Note  Vancomycin Consult    Petrona Green is a 43 y.o. female ordered vancomycin for Sx site infx; consult received from Dr. Nicholson to manage therapy. Also receiving cleocin.    Allergies:  Bee venom, Bentyl [dicyclomine hcl], Dicyclomine, Ketorolac tromethamine, Levofloxacin, Maitake, Shiitake mushroom, Sulfa antibiotics, Vancomycin, Zosyn [piperacillin sod-tazobactam so], Adhesive tape, Sulfacetamide, Acetaminophen, Butalbital-apap-caff-cod, Ceftaroline, Daptomycin, Flagyl [metronidazole], Haldol [haloperidol], Keppra [levetiracetam], Ketamine, Methocarbamol, Nitrofurantoin, Prochlorperazine, Reglan [metoclopramide], Silicone, and Tazobactam     Temp max:  Temp (24hrs), Av.2 °F (36.8 °C), Min:97.5 °F (36.4 °C), Max:98.6 °F (37 °C)      Recent Labs     24  1705 24  0625 24  1430   WBC 22.8* 15.6* 10.3       Recent Labs     24  1705 24  0625 24  1430   BUN 12 13 10   CREATININE 0.8 0.7 0.9         Intake/Output Summary (Last 24 hours) at 3/9/2024 1553  Last data filed at 3/9/2024 0917  Gross per 24 hour   Intake 720 ml   Output 1050 ml   Net -330 ml       Culture Results:      Ht Readings from Last 1 Encounters:   24 1.499 m (4' 11\")        Wt Readings from Last 1 Encounters:   24 80.5 kg (177 lb 6.4 oz)         Estimated Creatinine Clearance: 80 mL/min (based on SCr of 0.9 mg/dL).    Assessment/Plan:  Day # 1 of vancomycin.  Vanco 1500mg x1 then  Vancomycin 1000 mg IV every 12 hours.    Goal -600  Predicted       Thank you for the consult.

## 2024-03-09 NOTE — PLAN OF CARE
Problem: Discharge Planning  Goal: Discharge to home or other facility with appropriate resources  3/9/2024 1441 by Herson Child RN  Outcome: Not Progressing  3/9/2024 1440 by Herson Child RN  Outcome: Progressing     Problem: Pain  Goal: Verbalizes/displays adequate comfort level or baseline comfort level  3/9/2024 1441 by Herson Child RN  Outcome: Not Progressing  3/9/2024 1440 by Herson Child RN  Outcome: Progressing     Problem: Safety - Adult  Goal: Free from fall injury  3/9/2024 1441 by Herson Child RN  Outcome: Not Progressing  3/9/2024 1440 by Herson Child RN  Outcome: Progressing     Problem: ABCDS Injury Assessment  Goal: Absence of physical injury  3/9/2024 1441 by Herson Child RN  Outcome: Not Progressing  3/9/2024 1440 by Herson Child RN  Outcome: Progressing     Problem: Infection - Adult  Goal: Absence of infection at discharge  3/9/2024 1441 by Herson Child RN  Outcome: Not Progressing  3/9/2024 1440 by Herson Child RN  Outcome: Progressing  Goal: Absence of infection during hospitalization  3/9/2024 1441 by Herson Child RN  Outcome: Not Progressing  3/9/2024 1440 by Herson Child RN  Outcome: Progressing  Goal: Absence of fever/infection during anticipated neutropenic period  3/9/2024 1441 by Herson Child RN  Outcome: Not Progressing  3/9/2024 1440 by Herson Child RN  Outcome: Progressing     Problem: Discharge Planning  Goal: Discharge to home or other facility with appropriate resources  3/9/2024 1441 by Herson Child RN  Outcome: Not Progressing  3/9/2024 1440 by Herson Child RN  Outcome: Progressing     Problem: Pain  Goal: Verbalizes/displays adequate comfort level or baseline comfort level  3/9/2024 1441 by Herson Child RN  Outcome: Not Progressing  3/9/2024 1440 by Herson Child RN  Outcome: Progressing     Problem: Safety - Adult  Goal: Free from fall injury  3/9/2024 1441 by Herson Child, RN  Outcome: Not Progressing  3/9/2024 1440 by

## 2024-03-09 NOTE — PROGRESS NOTES
Arrived to place PICC line with bedside RN Herson. Pre-procedure and timeout done with RN, discussed limitations of placement and allergies. Consent confirmed. Vital signs stable. Labs, allergies, medications, and code status reviewed. No contraindications noted.     Procedure explained to pt, including the risk and benefits of the procedure. All questions answered. Pt verbalizes understanding of the procedure and states no more questions.       Pt's basilic, brachial, and cephalic are all easily collapsible with no indication for a clot. Vein selected is large enough for catheter. Pt tolerated sterile procedure well, with no difficulty accessing right basilic vein, when accessed - blood was free flowing and non-pulsatile. Guidewire, introducer, and catheter went in smoothly. PICC line verified with 3CG technology with peaked P-waves (please see image below).      Nurses:  OK to use PICC.    Please replace all existing IV tubing with new IV tubing prior to using the PICC for current IV infusions.  Please remove any PIVs from PICC arm.  All of the above may be sources of infection or an increase chance of a clot.      Post procedure - reorganized pt table, placed pt in lowest position, with call light and educated on line care. Instructed pt/RN not to use arm for at least 30min to avoid bleeding. Reported off to bedside RN.      If you have any questions please call number below and dispatch will direct you to the PICC RN that is on call.    [phone]

## 2024-03-09 NOTE — PROGRESS NOTES
Guzman removed per Doctor order; patient tolerated well ; no complication. Will continue to monitor.

## 2024-03-09 NOTE — PROGRESS NOTES
Patient is complaining of not been able to urinate; did bladder scan with 485 ML in the bladder. Notified attending on call via Telephone.   Received a verbal order for inserting a Guzman and Flomax 0.4 mg PO once daily. Order placed. Will continue to monitor.

## 2024-03-09 NOTE — PROGRESS NOTES
Pt is alert and oriented x4. Pt complaining of N/V. Pt cannot receive another dose of PRN Zofran until 0528. Pt stated she is allergic to compazine but can take phenergan. Notified Toy Cabral MD. Telephone with read back, ordered PRN phenergan IV, discontinued compazine.

## 2024-03-09 NOTE — CONSULTS
Infectious Diseases   Consult Note      Reason for Consult: Assistance with antibiotic, recent umbilical hernia repair  Requesting Physician: Dr. Marshall  Date of Admission: 3/7/2024  Subjective:   CHIEF COMPLAINT: Fevers, abdominal pain    HPI:  43-year-old female with history of ADHD,  shunt in place, prediabetes and recent repair of incarcerated ventral hernia with mesh performed on 3/6 that was admitted on 3/7 with complaints of fevers, nausea and worsening abdominal pain after returning home.  Upon presentation, WBC was 22.8 and she was afebrile.  CT of the abdomen and pelvis showed 2.4 x 2.5 cm fluid and gas containing periumbilical collection near the umbilicus.  Findings may represent postoperative hematoma or seroma or abscess.  General surgery has been following, there is no plan for any acute intervention.  WBC has improved from 22.8 to 15.6.  A Guzman was placed for retention.  She was started on clindamycin IV on 3/9.  She is tolerating clears at the moment but has not had any flatus or bowel movement.  There is slight erythema around the incision site. She is also complaining of pain and swelling in the right leg.                      Current abx: Clindamycin         Past Surgical History:       Diagnosis Date    ADHD (attention deficit hyperactivity disorder) 1988    Depression with anxiety     Difficult intubation     airway swelled up    Drug-seeking behavior     Encounter for imaging to screen for metal prior to MRI 06/01/2021    MRI Conditional [employer] Non-Programmable shunt model#89121 implanted 10/30/2020 at [facility]. Normal Mode. 1.5T or 3.0T.    ESBL (extended spectrum beta-lactamase) producing bacteria infection 11/06/2019    urine    Functional ovarian cysts 2008    rt ovary cyst x 2 yrs.    GERD (gastroesophageal reflux disease) When I was a kid    Headache(784.0)     migraines    History of blood transfusion     at birth    History of kidney stones     History of PCOS      incision site for recent hernia repair, tender to palpation.  There is no oozing at the incision site.  Guzman catheter in place with clear yellow urine  LYMPHADENOPATHY:  no axillary or supraclavicular adenopathy. No cervical adnenopathy  PSYCHIATRIC: Oriented to person place and time. No obvious depression or anxiety.  MUSCULOSKELETAL: No obvious misalignment or effusion of the joints. No clubbing, cyanosis of the digits.  SKIN:  normal skin color, texture, turgor and no redness, warmth, or swelling. No palpable nodules or stigmata of embolic phenomenon  NEUROLOGIC: nonfocal exam  ACCESS: PIV    DATA:    Old records have been reviewed    CBC:  Recent Labs     03/07/24  1705 03/08/24  0625   WBC 22.8* 15.6*   RBC 4.84 4.42   HGB 14.1 12.9   HCT 41.2 38.5    244   MCV 84.9 87.0   MCH 29.1 29.3   MCHC 34.3 33.6   RDW 14.5 14.5      BMP:  Recent Labs     03/07/24  1705 03/08/24  0625    137   K 3.5 3.9    104   CO2 23 21   BUN 12 13   CREATININE 0.8 0.7   CALCIUM 9.2 8.4   GLUCOSE 184* 102*        Cultures:   Bcx x 2 3/9: pending  MRSA nares 3/9: pending    Radiology Review:  All pertinent images / reports were reviewed as a part of this visit.     CT abd pelvis 3/7:  2.4 x 2.5 cm fluid and gas containing periumbilical collection near the  umbilicus.  These findings may represent postoperative hematoma, seroma or  abscess.    Assessment:     Patient Active Problem List   Diagnosis    Attention deficit hyperactivity disorder (ADHD)     (ventriculoperitoneal) shunt status    Scoliosis    Prediabetes    Tobacco smoker    Onychomycosis    Rectal bleeding    Congenital hydrocephalus (HCC)    Sepsis (HCC)    Ureteritis    Acute cystitis without hematuria    Lactic acidosis    Leukocytosis    History of brain shunt    Mood disorder (HCC)    Numbness and tingling in left hand    Diverticulosis    Colitis    Infection due to extended-spectrum beta-lactamase-producing Escherichia coli    Kidney stone

## 2024-03-09 NOTE — PLAN OF CARE
Problem: Discharge Planning  Goal: Discharge to home or other facility with appropriate resources  Outcome: Progressing     Problem: Pain  Goal: Verbalizes/displays adequate comfort level or baseline comfort level  Outcome: Progressing     Problem: Safety - Adult  Goal: Free from fall injury  Outcome: Progressing     Problem: ABCDS Injury Assessment  Goal: Absence of physical injury  Outcome: Progressing     Problem: Infection - Adult  Goal: Absence of infection at discharge  Outcome: Progressing  Goal: Absence of infection during hospitalization  Outcome: Progressing  Goal: Absence of fever/infection during anticipated neutropenic period  Outcome: Progressing

## 2024-03-09 NOTE — PROGRESS NOTES
General and Vascular Surgery                                                           Daily Progress Note                                                             Toy Cabral MD    Pt Name: Petrona Green  Medical Record Number: [id number]  Date of Birth 1980   Today's Date: 3/9/2024    ASSESSMENT/PLAN  S/P (3/6/24): Repair of recurrent incarcerated ventral hernia   Abdominal pain at incision site. +surrounding erythema.  Unclear if this is a true infection vs reactive.  As she has mesh and  shunt, will start on clindamycin (given significant allergies and hx of MRSA).  Will ask ID for recommendations.  Leukocytosis: WBC count 22.8 --> 15.6 yesterday  Unable to obtain labs via blood draw.  Will place PICC line  Still with significant nausea.  Continue current regimen.  Continue clears for now  Urinary retention - silva placed last night.  Patient would like to try to void today.  Will remove silva  OOB and ambulate as tolerated  Anxiety - continue ativan 0.5 mg po daily prn.  Had one time additional dose last night.        SUBJECTIVE  Petrona is unchanged from yesterday. Pain is somewhat controlled. She has nausea and no vomiting.  She has no flatus or BM. She is tolerating minimal thin liquids. Current activity is ad martha    OBJECTIVE  VITALS:  height is 1.499 m (4' 11\") and weight is 80.5 kg (177 lb 6.4 oz). Her oral temperature is 98.6 °F (37 °C). Her blood pressure is 115/76 and her pulse is 70. Her respiration is 17 and oxygen saturation is 96%. VITALS:  /76   Pulse 70   Temp 98.6 °F (37 °C) (Oral)   Resp 17   Ht 1.499 m (4' 11\")   Wt 80.5 kg (177 lb 6.4 oz)   LMP 11/13/2016   SpO2 96%   BMI 35.83 kg/m²   GENERAL: alert, no distress  LUNGS: normal respiratory effort, no accessory muscle use  HEART: normal rate and regular rhythm  ABDOMEN: soft, Incisional tenderness, ND, incision c/d/I, no erythema or induration, erythema

## 2024-03-09 NOTE — PROGRESS NOTES
Pt is alert and oriented x4. VSS.   Inserted silva catheter using sterile technique per order. Pt had urine output of 500 ml in silva bag. Urine is yellow and clear.  Pt was complaining of anxiety and requested PRN ativan. Pt did appear anxious. Ativan is only ordered once daily. Pt also stated she has gotten hives from Flomax before, but stated she also use to take it. Notified Toy Cabral MD. Ordered a one time dose of ativan. Pt can refuse flomax. Administered one time dose of ativan.     Pt complaining of 8/10 abd pain. Administered PRN dilaudid.    Pt stated stated she feels a lot of pressure at her incision site, it is painful, states she had some 'green ooze' drainage. This RN did not witness the drainage. Redness around incision site. Reinforced incision with steri strips and covered with 4x4 gauze. Pt is concerned about the incision re-opening, stated it has happened in the past. Notified Toy Cabral MD. MD stated to keep incision covered and apply ice pack, he will view assess the incision in the morning. Incision currently intact. Applied ice. Updated pt.

## 2024-03-10 LAB
ANION GAP SERPL CALCULATED.3IONS-SCNC: 11 MMOL/L (ref 3–16)
BASOPHILS # BLD: 0 K/UL (ref 0–0.2)
BASOPHILS NFR BLD: 0.5 %
BUN SERPL-MCNC: 8 MG/DL (ref 7–20)
CALCIUM SERPL-MCNC: 8.3 MG/DL (ref 8.3–10.6)
CHLORIDE SERPL-SCNC: 102 MMOL/L (ref 99–110)
CO2 SERPL-SCNC: 26 MMOL/L (ref 21–32)
CREAT SERPL-MCNC: 0.6 MG/DL (ref 0.6–1.1)
DEPRECATED RDW RBC AUTO: 14.3 % (ref 12.4–15.4)
EOSINOPHIL # BLD: 0.2 K/UL (ref 0–0.6)
EOSINOPHIL NFR BLD: 2.2 %
GFR SERPLBLD CREATININE-BSD FMLA CKD-EPI: >60 ML/MIN/{1.73_M2}
GLUCOSE SERPL-MCNC: 144 MG/DL (ref 70–99)
HCT VFR BLD AUTO: 32 % (ref 36–48)
HGB BLD-MCNC: 11 G/DL (ref 12–16)
LYMPHOCYTES # BLD: 2 K/UL (ref 1–5.1)
LYMPHOCYTES NFR BLD: 24.1 %
MAGNESIUM SERPL-MCNC: 1.8 MG/DL (ref 1.8–2.4)
MCH RBC QN AUTO: 29.3 PG (ref 26–34)
MCHC RBC AUTO-ENTMCNC: 34.3 G/DL (ref 31–36)
MCV RBC AUTO: 85.4 FL (ref 80–100)
MONOCYTES # BLD: 0.5 K/UL (ref 0–1.3)
MONOCYTES NFR BLD: 6.5 %
NEUTROPHILS # BLD: 5.6 K/UL (ref 1.7–7.7)
NEUTROPHILS NFR BLD: 66.7 %
PHOSPHATE SERPL-MCNC: 3 MG/DL (ref 2.5–4.9)
PLATELET # BLD AUTO: 194 K/UL (ref 135–450)
PMV BLD AUTO: 7.8 FL (ref 5–10.5)
POTASSIUM SERPL-SCNC: 3.5 MMOL/L (ref 3.5–5.1)
RBC # BLD AUTO: 3.75 M/UL (ref 4–5.2)
SODIUM SERPL-SCNC: 139 MMOL/L (ref 136–145)
WBC # BLD AUTO: 8.4 K/UL (ref 4–11)

## 2024-03-10 PROCEDURE — 83735 ASSAY OF MAGNESIUM: CPT

## 2024-03-10 PROCEDURE — 6370000000 HC RX 637 (ALT 250 FOR IP): Performed by: SURGERY

## 2024-03-10 PROCEDURE — 6360000002 HC RX W HCPCS: Performed by: STUDENT IN AN ORGANIZED HEALTH CARE EDUCATION/TRAINING PROGRAM

## 2024-03-10 PROCEDURE — 84100 ASSAY OF PHOSPHORUS: CPT

## 2024-03-10 PROCEDURE — 1200000000 HC SEMI PRIVATE

## 2024-03-10 PROCEDURE — 99232 SBSQ HOSP IP/OBS MODERATE 35: CPT | Performed by: SURGERY

## 2024-03-10 PROCEDURE — 6370000000 HC RX 637 (ALT 250 FOR IP)

## 2024-03-10 PROCEDURE — 6360000002 HC RX W HCPCS: Performed by: INTERNAL MEDICINE

## 2024-03-10 PROCEDURE — 2580000003 HC RX 258: Performed by: INTERNAL MEDICINE

## 2024-03-10 PROCEDURE — 2580000003 HC RX 258: Performed by: SURGERY

## 2024-03-10 PROCEDURE — 80048 BASIC METABOLIC PNL TOTAL CA: CPT

## 2024-03-10 PROCEDURE — 6360000002 HC RX W HCPCS: Performed by: SURGERY

## 2024-03-10 PROCEDURE — 6370000000 HC RX 637 (ALT 250 FOR IP): Performed by: STUDENT IN AN ORGANIZED HEALTH CARE EDUCATION/TRAINING PROGRAM

## 2024-03-10 PROCEDURE — 36415 COLL VENOUS BLD VENIPUNCTURE: CPT

## 2024-03-10 PROCEDURE — 85025 COMPLETE CBC W/AUTO DIFF WBC: CPT

## 2024-03-10 RX ORDER — FUROSEMIDE 10 MG/ML
20 INJECTION INTRAMUSCULAR; INTRAVENOUS ONCE
Status: COMPLETED | OUTPATIENT
Start: 2024-03-10 | End: 2024-03-10

## 2024-03-10 RX ADMIN — GABAPENTIN 400 MG: 400 CAPSULE ORAL at 21:49

## 2024-03-10 RX ADMIN — PANTOPRAZOLE SODIUM 40 MG: 40 TABLET, DELAYED RELEASE ORAL at 05:09

## 2024-03-10 RX ADMIN — SODIUM CHLORIDE, PRESERVATIVE FREE 10 ML: 5 INJECTION INTRAVENOUS at 21:50

## 2024-03-10 RX ADMIN — ENOXAPARIN SODIUM 40 MG: 100 INJECTION SUBCUTANEOUS at 08:29

## 2024-03-10 RX ADMIN — HYDROMORPHONE HYDROCHLORIDE 0.5 MG: 1 INJECTION, SOLUTION INTRAMUSCULAR; INTRAVENOUS; SUBCUTANEOUS at 21:50

## 2024-03-10 RX ADMIN — HYDROMORPHONE HYDROCHLORIDE 0.5 MG: 1 INJECTION, SOLUTION INTRAMUSCULAR; INTRAVENOUS; SUBCUTANEOUS at 00:30

## 2024-03-10 RX ADMIN — HYDROMORPHONE HYDROCHLORIDE 0.5 MG: 1 INJECTION, SOLUTION INTRAMUSCULAR; INTRAVENOUS; SUBCUTANEOUS at 18:37

## 2024-03-10 RX ADMIN — CYCLOBENZAPRINE 5 MG: 10 TABLET, FILM COATED ORAL at 06:13

## 2024-03-10 RX ADMIN — HYDROMORPHONE HYDROCHLORIDE 0.5 MG: 1 INJECTION, SOLUTION INTRAMUSCULAR; INTRAVENOUS; SUBCUTANEOUS at 04:56

## 2024-03-10 RX ADMIN — HYDROMORPHONE HYDROCHLORIDE 0.5 MG: 1 INJECTION, SOLUTION INTRAMUSCULAR; INTRAVENOUS; SUBCUTANEOUS at 15:45

## 2024-03-10 RX ADMIN — CLINDAMYCIN PHOSPHATE 900 MG: 900 INJECTION, SOLUTION INTRAVENOUS at 08:34

## 2024-03-10 RX ADMIN — HYDROMORPHONE HYDROCHLORIDE 0.5 MG: 1 INJECTION, SOLUTION INTRAMUSCULAR; INTRAVENOUS; SUBCUTANEOUS at 08:29

## 2024-03-10 RX ADMIN — POTASSIUM BICARBONATE 40 MEQ: 782 TABLET, EFFERVESCENT ORAL at 01:00

## 2024-03-10 RX ADMIN — GABAPENTIN 400 MG: 400 CAPSULE ORAL at 08:29

## 2024-03-10 RX ADMIN — VANCOMYCIN HYDROCHLORIDE 1000 MG: 1 INJECTION, POWDER, LYOPHILIZED, FOR SOLUTION INTRAVENOUS at 05:28

## 2024-03-10 RX ADMIN — LAMOTRIGINE 100 MG: 100 TABLET ORAL at 21:49

## 2024-03-10 RX ADMIN — Medication 12.5 MG: at 00:33

## 2024-03-10 RX ADMIN — GABAPENTIN 400 MG: 400 CAPSULE ORAL at 13:33

## 2024-03-10 RX ADMIN — CLINDAMYCIN PHOSPHATE 900 MG: 900 INJECTION, SOLUTION INTRAVENOUS at 03:29

## 2024-03-10 RX ADMIN — CLINDAMYCIN PHOSPHATE 900 MG: 900 INJECTION, SOLUTION INTRAVENOUS at 18:35

## 2024-03-10 RX ADMIN — VANCOMYCIN HYDROCHLORIDE 1000 MG: 1 INJECTION, POWDER, LYOPHILIZED, FOR SOLUTION INTRAVENOUS at 18:37

## 2024-03-10 RX ADMIN — ONDANSETRON 4 MG: 2 INJECTION INTRAMUSCULAR; INTRAVENOUS at 12:05

## 2024-03-10 RX ADMIN — SODIUM CHLORIDE, POTASSIUM CHLORIDE, SODIUM LACTATE AND CALCIUM CHLORIDE: 600; 310; 30; 20 INJECTION, SOLUTION INTRAVENOUS at 07:43

## 2024-03-10 RX ADMIN — SENNOSIDES AND DOCUSATE SODIUM 1 TABLET: 8.6; 5 TABLET ORAL at 08:29

## 2024-03-10 RX ADMIN — SENNOSIDES AND DOCUSATE SODIUM 1 TABLET: 8.6; 5 TABLET ORAL at 21:49

## 2024-03-10 RX ADMIN — TAMSULOSIN HYDROCHLORIDE 0.4 MG: 0.4 CAPSULE ORAL at 08:29

## 2024-03-10 RX ADMIN — HYDROMORPHONE HYDROCHLORIDE 0.5 MG: 1 INJECTION, SOLUTION INTRAMUSCULAR; INTRAVENOUS; SUBCUTANEOUS at 12:05

## 2024-03-10 RX ADMIN — FUROSEMIDE 20 MG: 10 INJECTION, SOLUTION INTRAMUSCULAR; INTRAVENOUS at 12:09

## 2024-03-10 ASSESSMENT — PAIN SCALES - GENERAL
PAINLEVEL_OUTOF10: 9
PAINLEVEL_OUTOF10: 10
PAINLEVEL_OUTOF10: 9
PAINLEVEL_OUTOF10: 1
PAINLEVEL_OUTOF10: 9
PAINLEVEL_OUTOF10: 10
PAINLEVEL_OUTOF10: 0

## 2024-03-10 ASSESSMENT — PAIN DESCRIPTION - LOCATION
LOCATION: ABDOMEN

## 2024-03-10 ASSESSMENT — PAIN DESCRIPTION - DESCRIPTORS
DESCRIPTORS: ACHING;DISCOMFORT
DESCRIPTORS: CRAMPING
DESCRIPTORS: SHARP
DESCRIPTORS: SHARP

## 2024-03-10 ASSESSMENT — PAIN DESCRIPTION - ORIENTATION
ORIENTATION: MID
ORIENTATION: MID;RIGHT
ORIENTATION: MID

## 2024-03-10 NOTE — PROGRESS NOTES
Pt resting in room throughout day.  Complaints of pain to mid abdomen, prn medication given (see eMAR).  Pt updated on plan of care and states understanding.  Call light within reach, bed in low position, pt demonstrates ability to call for assistance as needed.

## 2024-03-10 NOTE — PLAN OF CARE
Problem: Discharge Planning  Goal: Discharge to home or other facility with appropriate resources  3/10/2024 1505 by Nohemi Cisneros RN  Outcome: Progressing  3/10/2024 0143 by Ursula Dubon RN  Outcome: Progressing  Flowsheets (Taken 3/9/2024 2112)  Discharge to home or other facility with appropriate resources:   Identify barriers to discharge with patient and caregiver   Arrange for needed discharge resources and transportation as appropriate   Identify discharge learning needs (meds, wound care, etc)   Arrange for interpreters to assist at discharge as needed   Refer to discharge planning if patient needs post-hospital services based on physician order or complex needs related to functional status, cognitive ability or social support system     Problem: Pain  Goal: Verbalizes/displays adequate comfort level or baseline comfort level  3/10/2024 1505 by Nohemi Cisneros RN  Outcome: Progressing  3/10/2024 0143 by Ursula Dubon RN  Outcome: Progressing     Problem: Safety - Adult  Goal: Free from fall injury  3/10/2024 1505 by Nohemi Cisneros RN  Outcome: Progressing  3/10/2024 0143 by Ursula Dubon RN  Outcome: Progressing     Problem: ABCDS Injury Assessment  Goal: Absence of physical injury  3/10/2024 1505 by Nohemi Cisneros RN  Outcome: Progressing  3/10/2024 0143 by Ursula Dubon RN  Outcome: Progressing     Problem: Infection - Adult  Goal: Absence of infection at discharge  3/10/2024 0143 by Ursula Dubon RN  Outcome: Progressing  Flowsheets (Taken 3/9/2024 2112)  Absence of infection at discharge:   Assess and monitor for signs and symptoms of infection   Monitor lab/diagnostic results   Monitor all insertion sites i.e., indwelling lines, tubes and drains   Monitor endotracheal (as able) and nasal secretions for changes in amount and color   [place] appropriate cooling/warming therapies per order   Administer medications as ordered

## 2024-03-10 NOTE — PROGRESS NOTES
General and Vascular Surgery                                                           Daily Progress Note                                                             Toy Cabral MD    Pt Name: Petrona Green  Medical Record Number: [id number]  Date of Birth 1980   Today's Date: 3/10/2024    ASSESSMENT/PLAN  S/P (3/6/24): Repair of recurrent incarcerated ventral hernia   Abdominal pain at incision site. +surrounding erythema.  Unclear if this is a true infection vs reactive.  As she has mesh and  shunt, will start on clindamycin (given significant allergies and hx of MRSA).  ID on board.  Started on vancomycin.  May recheck imaging tomorrow for possible aspiration of fluid to guide antibiotics.  Hopeful discharge home tomorrow after aspiration of fluid.  Leukocytosis: WBC count 22.8 --> 15.6 ->10.3->8.4  PICC line placed for blood draws  Tolerating regular diet.  Complains of nausea, but finished entire plate of food today  Urinary retention - silva removed, voiding  OOB and ambulate as tolerated  Anxiety - continue ativan 0.5 mg po daily prn.          SUBJECTIVE  Petrona has improved from yesterday. Pain is better controlled. She has nausea and no vomiting.   She is tolerating a regular diet. Current activity is ad martha    OBJECTIVE  VITALS:  height is 1.499 m (4' 11\") and weight is 89.5 kg (197 lb 6.4 oz). Her oral temperature is 97.7 °F (36.5 °C). Her blood pressure is 123/83 and her pulse is 77. Her respiration is 16 and oxygen saturation is 95%. VITALS:  /83   Pulse 77   Temp 97.7 °F (36.5 °C) (Oral)   Resp 16   Ht 1.499 m (4' 11\")   Wt 89.5 kg (197 lb 6.4 oz)   LMP 11/13/2016   SpO2 95%   BMI 39.87 kg/m²   GENERAL: alert, no distress  LUNGS: normal respiratory effort, no accessory muscle use  HEART: normal rate and regular rhythm  ABDOMEN: soft, Incisional tenderness, ND, incision c/d/I, erythema improved  EXTREMITY: no  cyanosis, clubbing  I/O last 3 completed shifts:  In: 1717.7 [P.O.:1080; IV Piggyback:637.7]  Out: 1050 [Urine:1050]  I/O this shift:  In: 355 [P.O.:355]  Out: -     LABS  Recent Labs     03/07/24  1705 03/07/24  1917 03/08/24  0625 03/10/24  0511   WBC 22.8*  --    < > 8.4   HGB 14.1  --    < > 11.0*   HCT 41.2  --    < > 32.0*     --    < > 194     --    < > 139   K 3.5  --    < > 3.5     --    < > 102   CO2 23  --    < > 26   BUN 12  --    < > 8   CREATININE 0.8  --    < > 0.6   MG 2.30  --    < > 1.80   PHOS  --   --    < > 3.0   CALCIUM 9.2  --    < > 8.3   AST 15  --   --   --    ALT 17  --   --   --    BILITOT 0.3  --   --   --    NITRU  --  Negative  --   --    COLORU  --  Yellow  --   --     < > = values in this interval not displayed.     CBC:   Lab Results   Component Value Date/Time    WBC 8.4 03/10/2024 05:11 AM    RBC 3.75 03/10/2024 05:11 AM    HGB 11.0 03/10/2024 05:11 AM    HCT 32.0 03/10/2024 05:11 AM    MCV 85.4 03/10/2024 05:11 AM    MCH 29.3 03/10/2024 05:11 AM    MCHC 34.3 03/10/2024 05:11 AM    RDW 14.3 03/10/2024 05:11 AM     03/10/2024 05:11 AM    MPV 7.8 03/10/2024 05:11 AM     CMP:    Lab Results   Component Value Date/Time     03/10/2024 05:11 AM    K 3.5 03/10/2024 05:11 AM     03/10/2024 05:11 AM    CO2 26 03/10/2024 05:11 AM    BUN 8 03/10/2024 05:11 AM    CREATININE 0.6 03/10/2024 05:11 AM    GFRAA >60 10/11/2022 06:01 AM    GFRAA >60 10/08/2011 10:35 PM    AGRATIO 1.6 03/07/2024 05:05 PM    LABGLOM >60 03/10/2024 05:11 AM    GLUCOSE 144 03/10/2024 05:11 AM    PROT 7.2 03/07/2024 05:05 PM    PROT 7.3 08/14/2011 07:30 PM    LABALBU 4.4 03/07/2024 05:05 PM    CALCIUM 8.3 03/10/2024 05:11 AM    BILITOT 0.3 03/07/2024 05:05 PM    ALKPHOS 112 03/07/2024 05:05 PM    AST 15 03/07/2024 05:05 PM    ALT 17 03/07/2024 05:05 PM         Toy Cabral MD  Electronically signed 3/10/2024 at 10:51 AM

## 2024-03-10 NOTE — PLAN OF CARE
Problem: Discharge Planning  Goal: Discharge to home or other facility with appropriate resources  3/10/2024 0143 by Ursula Dubon RN  Outcome: Progressing  Flowsheets (Taken 3/9/2024 2112)  Discharge to home or other facility with appropriate resources:   Identify barriers to discharge with patient and caregiver   Arrange for needed discharge resources and transportation as appropriate   Identify discharge learning needs (meds, wound care, etc)   Arrange for interpreters to assist at discharge as needed   Refer to discharge planning if patient needs post-hospital services based on physician order or complex needs related to functional status, cognitive ability or social support system  3/9/2024 1441 by Herson Child RN  Outcome: Not Progressing  3/9/2024 1440 by Herson Child RN  Outcome: Progressing     Problem: Pain  Goal: Verbalizes/displays adequate comfort level or baseline comfort level  3/10/2024 0143 by Ursula Dubon RN  Outcome: Progressing  3/9/2024 1441 by Herson Child RN  Outcome: Not Progressing  3/9/2024 1440 by Herson Child RN  Outcome: Progressing     Problem: Safety - Adult  Goal: Free from fall injury  3/10/2024 0143 by Ursula Dubon RN  Outcome: Progressing  3/9/2024 1441 by Herson Child RN  Outcome: Not Progressing  3/9/2024 1440 by Herson Child RN  Outcome: Progressing     Problem: ABCDS Injury Assessment  Goal: Absence of physical injury  3/10/2024 0143 by Ursula Dubon RN  Outcome: Progressing  3/9/2024 1441 by Herson Child RN  Outcome: Not Progressing  3/9/2024 1440 by Herson Child RN  Outcome: Progressing     Problem: Infection - Adult  Goal: Absence of infection at discharge  3/10/2024 0143 by rUsula Dubon RN  Outcome: Progressing  Flowsheets (Taken 3/9/2024 2112)  Absence of infection at discharge:   Assess and monitor for signs and symptoms of infection   Monitor lab/diagnostic results   Monitor all insertion sites i.e.,  SHANA Vergara  Outcome: Progressing  Flowsheets (Taken 3/9/2024 2112)  Discharge to home or other facility with appropriate resources:   Identify barriers to discharge with patient and caregiver   Arrange for needed discharge resources and transportation as appropriate   Identify discharge learning needs (meds, wound care, etc)   Arrange for interpreters to assist at discharge as needed   Refer to discharge planning if patient needs post-hospital services based on physician order or complex needs related to functional status, cognitive ability or social support system  3/9/2024 1441 by Herson Child RN  Outcome: Not Progressing  3/9/2024 1440 by Herson Child RN  Outcome: Progressing     Problem: Pain  Goal: Verbalizes/displays adequate comfort level or baseline comfort level  3/10/2024 0143 by Ursula Dubon RN  Outcome: Progressing  3/9/2024 1441 by Herson Child RN  Outcome: Not Progressing  3/9/2024 1440 by Herson Child RN  Outcome: Progressing     Problem: Safety - Adult  Goal: Free from fall injury  3/10/2024 0143 by Ursula Dubon RN  Outcome: Progressing  3/9/2024 1441 by Herson Child RN  Outcome: Not Progressing  3/9/2024 1440 by Herson Child RN  Outcome: Progressing     Problem: ABCDS Injury Assessment  Goal: Absence of physical injury  3/10/2024 0143 by Ursula Dubon RN  Outcome: Progressing  3/9/2024 1441 by Herson Child RN  Outcome: Not Progressing  3/9/2024 1440 by Herson Child RN  Outcome: Progressing     Problem: Infection - Adult  Goal: Absence of infection at discharge  3/10/2024 0143 by Ursula Dubon RN  Outcome: Progressing  Flowsheets (Taken 3/9/2024 2112)  Absence of infection at discharge:   Assess and monitor for signs and symptoms of infection   Monitor lab/diagnostic results   Monitor all insertion sites i.e., indwelling lines, tubes and drains   Monitor endotracheal (as able) and nasal secretions for changes in amount and color   [place]

## 2024-03-11 ENCOUNTER — APPOINTMENT (OUTPATIENT)
Dept: CT IMAGING | Age: 44
DRG: 711 | End: 2024-03-11
Payer: COMMERCIAL

## 2024-03-11 VITALS
OXYGEN SATURATION: 94 % | DIASTOLIC BLOOD PRESSURE: 75 MMHG | HEIGHT: 59 IN | TEMPERATURE: 98.9 F | RESPIRATION RATE: 16 BRPM | SYSTOLIC BLOOD PRESSURE: 112 MMHG | WEIGHT: 193.6 LBS | BODY MASS INDEX: 39.03 KG/M2 | HEART RATE: 88 BPM

## 2024-03-11 PROBLEM — K42.9 UMBILICAL HERNIA WITHOUT OBSTRUCTION AND WITHOUT GANGRENE: Status: ACTIVE | Noted: 2024-03-11

## 2024-03-11 PROBLEM — Z92.89 HISTORY OF CREATION OF VENTRICULOPERITONEAL SHUNT: Status: ACTIVE | Noted: 2022-10-10

## 2024-03-11 LAB
ANION GAP SERPL CALCULATED.3IONS-SCNC: 9 MMOL/L (ref 3–16)
BASOPHILS # BLD: 0.1 K/UL (ref 0–0.2)
BASOPHILS NFR BLD: 0.8 %
BUN SERPL-MCNC: 8 MG/DL (ref 7–20)
CALCIUM SERPL-MCNC: 7.3 MG/DL (ref 8.3–10.6)
CHLORIDE SERPL-SCNC: 105 MMOL/L (ref 99–110)
CO2 SERPL-SCNC: 28 MMOL/L (ref 21–32)
CREAT SERPL-MCNC: 0.6 MG/DL (ref 0.6–1.1)
DEPRECATED RDW RBC AUTO: 14.5 % (ref 12.4–15.4)
EOSINOPHIL # BLD: 0.2 K/UL (ref 0–0.6)
EOSINOPHIL NFR BLD: 2 %
GFR SERPLBLD CREATININE-BSD FMLA CKD-EPI: >60 ML/MIN/{1.73_M2}
GLUCOSE SERPL-MCNC: 106 MG/DL (ref 70–99)
HCT VFR BLD AUTO: 32.8 % (ref 36–48)
HGB BLD-MCNC: 11.3 G/DL (ref 12–16)
INR PPP: 0.92 (ref 0.84–1.16)
LYMPHOCYTES # BLD: 1.6 K/UL (ref 1–5.1)
LYMPHOCYTES NFR BLD: 17.7 %
MAGNESIUM SERPL-MCNC: 1.9 MG/DL (ref 1.8–2.4)
MCH RBC QN AUTO: 29.3 PG (ref 26–34)
MCHC RBC AUTO-ENTMCNC: 34.4 G/DL (ref 31–36)
MCV RBC AUTO: 85.2 FL (ref 80–100)
MONOCYTES # BLD: 0.6 K/UL (ref 0–1.3)
MONOCYTES NFR BLD: 6.9 %
MRSA DNA SPEC QL NAA+PROBE: NORMAL
NEUTROPHILS # BLD: 6.5 K/UL (ref 1.7–7.7)
NEUTROPHILS NFR BLD: 72.6 %
PHOSPHATE SERPL-MCNC: 3.4 MG/DL (ref 2.5–4.9)
PLATELET # BLD AUTO: 197 K/UL (ref 135–450)
PMV BLD AUTO: 8.5 FL (ref 5–10.5)
POTASSIUM SERPL-SCNC: 2.9 MMOL/L (ref 3.5–5.1)
PROTHROMBIN TIME: 12.4 SEC (ref 11.5–14.8)
RBC # BLD AUTO: 3.85 M/UL (ref 4–5.2)
SODIUM SERPL-SCNC: 142 MMOL/L (ref 136–145)
VANCOMYCIN SERPL-MCNC: 6.9 UG/ML
WBC # BLD AUTO: 9 K/UL (ref 4–11)

## 2024-03-11 PROCEDURE — 6360000002 HC RX W HCPCS: Performed by: RADIOLOGY

## 2024-03-11 PROCEDURE — 6370000000 HC RX 637 (ALT 250 FOR IP): Performed by: SURGERY

## 2024-03-11 PROCEDURE — 2709999900 CT DRAINAGE VISCERAL PERCUTANEOUS

## 2024-03-11 PROCEDURE — 0W9G3ZZ DRAINAGE OF PERITONEAL CAVITY, PERCUTANEOUS APPROACH: ICD-10-PCS | Performed by: PSYCHIATRY & NEUROLOGY

## 2024-03-11 PROCEDURE — 6370000000 HC RX 637 (ALT 250 FOR IP): Performed by: STUDENT IN AN ORGANIZED HEALTH CARE EDUCATION/TRAINING PROGRAM

## 2024-03-11 PROCEDURE — 85610 PROTHROMBIN TIME: CPT

## 2024-03-11 PROCEDURE — 99024 POSTOP FOLLOW-UP VISIT: CPT | Performed by: PHYSICIAN ASSISTANT

## 2024-03-11 PROCEDURE — 2580000003 HC RX 258: Performed by: SURGERY

## 2024-03-11 PROCEDURE — 99232 SBSQ HOSP IP/OBS MODERATE 35: CPT | Performed by: INTERNAL MEDICINE

## 2024-03-11 PROCEDURE — APPNB15 APP NON BILLABLE TIME 0-15 MINS: Performed by: PHYSICIAN ASSISTANT

## 2024-03-11 PROCEDURE — 99232 SBSQ HOSP IP/OBS MODERATE 35: CPT | Performed by: SURGERY

## 2024-03-11 PROCEDURE — 84100 ASSAY OF PHOSPHORUS: CPT

## 2024-03-11 PROCEDURE — 80048 BASIC METABOLIC PNL TOTAL CA: CPT

## 2024-03-11 PROCEDURE — 87205 SMEAR GRAM STAIN: CPT

## 2024-03-11 PROCEDURE — 85025 COMPLETE CBC W/AUTO DIFF WBC: CPT

## 2024-03-11 PROCEDURE — 93971 EXTREMITY STUDY: CPT

## 2024-03-11 PROCEDURE — 2580000003 HC RX 258: Performed by: INTERNAL MEDICINE

## 2024-03-11 PROCEDURE — 80202 ASSAY OF VANCOMYCIN: CPT

## 2024-03-11 PROCEDURE — 87070 CULTURE OTHR SPECIMN AEROBIC: CPT

## 2024-03-11 PROCEDURE — 6360000002 HC RX W HCPCS: Performed by: STUDENT IN AN ORGANIZED HEALTH CARE EDUCATION/TRAINING PROGRAM

## 2024-03-11 PROCEDURE — 6360000002 HC RX W HCPCS: Performed by: SURGERY

## 2024-03-11 PROCEDURE — 87075 CULTR BACTERIA EXCEPT BLOOD: CPT

## 2024-03-11 PROCEDURE — 6360000002 HC RX W HCPCS: Performed by: INTERNAL MEDICINE

## 2024-03-11 PROCEDURE — 83735 ASSAY OF MAGNESIUM: CPT

## 2024-03-11 PROCEDURE — APPSS15 APP SPLIT SHARED TIME 0-15 MINUTES: Performed by: PHYSICIAN ASSISTANT

## 2024-03-11 RX ORDER — CLINDAMYCIN HYDROCHLORIDE 300 MG/1
300 CAPSULE ORAL 3 TIMES DAILY
Qty: 21 CAPSULE | Refills: 0 | Status: ON HOLD | OUTPATIENT
Start: 2024-03-11 | End: 2024-03-18 | Stop reason: HOSPADM

## 2024-03-11 RX ORDER — LORAZEPAM 2 MG/ML
1 INJECTION INTRAMUSCULAR ONCE
Status: COMPLETED | OUTPATIENT
Start: 2024-03-11 | End: 2024-03-11

## 2024-03-11 RX ORDER — HYDROCODONE BITARTRATE AND ACETAMINOPHEN 5; 325 MG/1; MG/1
1 TABLET ORAL EVERY 6 HOURS PRN
Qty: 15 TABLET | Refills: 0 | Status: ON HOLD | OUTPATIENT
Start: 2024-03-11 | End: 2024-03-18 | Stop reason: HOSPADM

## 2024-03-11 RX ORDER — VANCOMYCIN 1.75 G/350ML
1250 INJECTION, SOLUTION INTRAVENOUS EVERY 12 HOURS
Status: DISCONTINUED | OUTPATIENT
Start: 2024-03-11 | End: 2024-03-11 | Stop reason: HOSPADM

## 2024-03-11 RX ADMIN — SODIUM CHLORIDE, PRESERVATIVE FREE 10 ML: 5 INJECTION INTRAVENOUS at 08:25

## 2024-03-11 RX ADMIN — POTASSIUM CHLORIDE 40 MEQ: 1500 TABLET, EXTENDED RELEASE ORAL at 08:25

## 2024-03-11 RX ADMIN — CLINDAMYCIN PHOSPHATE 900 MG: 900 INJECTION, SOLUTION INTRAVENOUS at 10:23

## 2024-03-11 RX ADMIN — LORAZEPAM 0.5 MG: 0.5 TABLET ORAL at 02:01

## 2024-03-11 RX ADMIN — GABAPENTIN 400 MG: 400 CAPSULE ORAL at 15:01

## 2024-03-11 RX ADMIN — HYDROMORPHONE HYDROCHLORIDE 0.5 MG: 1 INJECTION, SOLUTION INTRAMUSCULAR; INTRAVENOUS; SUBCUTANEOUS at 00:55

## 2024-03-11 RX ADMIN — SENNOSIDES AND DOCUSATE SODIUM 1 TABLET: 8.6; 5 TABLET ORAL at 08:25

## 2024-03-11 RX ADMIN — Medication 12.5 MG: at 04:27

## 2024-03-11 RX ADMIN — HYDROMORPHONE HYDROCHLORIDE 0.5 MG: 1 INJECTION, SOLUTION INTRAMUSCULAR; INTRAVENOUS; SUBCUTANEOUS at 16:32

## 2024-03-11 RX ADMIN — VANCOMYCIN 1250 MG: 1.75 INJECTION, SOLUTION INTRAVENOUS at 15:01

## 2024-03-11 RX ADMIN — LORAZEPAM 1 MG: 2 INJECTION INTRAMUSCULAR; INTRAVENOUS at 11:57

## 2024-03-11 RX ADMIN — TAMSULOSIN HYDROCHLORIDE 0.4 MG: 0.4 CAPSULE ORAL at 08:25

## 2024-03-11 RX ADMIN — HYDROMORPHONE HYDROCHLORIDE 0.5 MG: 1 INJECTION, SOLUTION INTRAMUSCULAR; INTRAVENOUS; SUBCUTANEOUS at 10:56

## 2024-03-11 RX ADMIN — CLINDAMYCIN PHOSPHATE 900 MG: 900 INJECTION, SOLUTION INTRAVENOUS at 01:00

## 2024-03-11 RX ADMIN — GABAPENTIN 400 MG: 400 CAPSULE ORAL at 08:25

## 2024-03-11 RX ADMIN — HYDROMORPHONE HYDROCHLORIDE 0.5 MG: 1 INJECTION, SOLUTION INTRAMUSCULAR; INTRAVENOUS; SUBCUTANEOUS at 03:56

## 2024-03-11 RX ADMIN — HYDROMORPHONE HYDROCHLORIDE 0.5 MG: 1 INJECTION, SOLUTION INTRAMUSCULAR; INTRAVENOUS; SUBCUTANEOUS at 07:04

## 2024-03-11 RX ADMIN — PANTOPRAZOLE SODIUM 40 MG: 40 TABLET, DELAYED RELEASE ORAL at 05:42

## 2024-03-11 RX ADMIN — VANCOMYCIN HYDROCHLORIDE 1000 MG: 1 INJECTION, POWDER, LYOPHILIZED, FOR SOLUTION INTRAVENOUS at 05:45

## 2024-03-11 ASSESSMENT — PAIN SCALES - GENERAL
PAINLEVEL_OUTOF10: 9
PAINLEVEL_OUTOF10: 7
PAINLEVEL_OUTOF10: 9
PAINLEVEL_OUTOF10: 8
PAINLEVEL_OUTOF10: 9
PAINLEVEL_OUTOF10: 9

## 2024-03-11 ASSESSMENT — PAIN DESCRIPTION - DESCRIPTORS
DESCRIPTORS: SHARP

## 2024-03-11 ASSESSMENT — PAIN DESCRIPTION - ORIENTATION
ORIENTATION: MID;RIGHT;LEFT
ORIENTATION: MID
ORIENTATION: MID;LEFT;RIGHT

## 2024-03-11 ASSESSMENT — PAIN DESCRIPTION - LOCATION
LOCATION: ABDOMEN

## 2024-03-11 NOTE — PROGRESS NOTES
Patient 's chlorhexidine wipes and complete linen change is done for IR procedure tomorrow. Pre op checklist and PICC dressing change is also complete. Patient have been complaining of mid abdominal pain, managed by PRN pain medicine.

## 2024-03-11 NOTE — PROGRESS NOTES
General and Vascular Surgery                                                           Daily Progress Note                                                             CRISPIN Ortega    Pt Name: Petrona Green  Medical Record Number: [id number]  Date of Birth 1980   Today's Date: 3/11/2024    ASSESSMENT/PLAN  S/P (3/6/24): Repair of recurrent incarcerated ventral hernia   Abdominal pain at incision site. +surrounding erythema.  Unclear if this is a true infection vs reactive.  As she has mesh and  shunt, will start on clindamycin (given significant allergies and hx of MRSA).  ID on board.  Started on vancomycin.    Aspiration of abdominal fluid today by radiology.    Leukocytosis: WBC count 22.8 --> 15.6 ->10.3->8.4-->9.0  Tolerating regular diet.    OOB and ambulate as tolerated  Anxiety - continue ativan 0.5 mg po daily prn.    Ok to discharge home after aspiration of fluid.  SUBJECTIVE  Petrona has improved from yesterday. Pain is better controlled. She has nausea and no vomiting.   She is tolerating a regular diet. Current activity is ad martha    OBJECTIVE  VITALS:  height is 1.499 m (4' 11\") and weight is 87.8 kg (193 lb 9.6 oz). Her oral temperature is 98.9 °F (37.2 °C). Her blood pressure is 112/75 and her pulse is 88. Her respiration is 16 and oxygen saturation is 94%. VITALS:  /75   Pulse 88   Temp 98.9 °F (37.2 °C) (Oral)   Resp 16   Ht 1.499 m (4' 11\")   Wt 87.8 kg (193 lb 9.6 oz)   LMP 11/13/2016   SpO2 94%   BMI 39.10 kg/m²   GENERAL: alert, no distress  LUNGS: normal respiratory effort, no accessory muscle use  HEART: normal rate and regular rhythm  ABDOMEN: soft, Incisional tenderness, ND, incision c/d/I, erythema improved  EXTREMITY: no cyanosis, clubbing  I/O last 3 completed shifts:  In: 1652.7 [P.O.:1015; IV Piggyback:637.7]  Out: -   No intake/output data recorded.    LABS  Recent Labs     03/11/24  0559   WBC 9.0   HGB  introduce bacteria.    Hopeful discharge later today.  Will need ID recs prior to discharge  Follow up with Dr. Marshall in 1-2 weeks, call for appointment    Toy Cabral MD

## 2024-03-11 NOTE — PLAN OF CARE
Problem: Discharge Planning  Goal: Discharge to home or other facility with appropriate resources  3/10/2024 2206 by Ursula Dubon RN  Outcome: Progressing  Flowsheets (Taken 3/10/2024 2152)  Discharge to home or other facility with appropriate resources:   Identify barriers to discharge with patient and caregiver   Arrange for needed discharge resources and transportation as appropriate   Identify discharge learning needs (meds, wound care, etc)  3/10/2024 1505 by Nohemi Cisneros RN  Outcome: Progressing     Problem: Pain  Goal: Verbalizes/displays adequate comfort level or baseline comfort level  3/10/2024 2206 by Ursula Dubon RN  Outcome: Progressing  3/10/2024 1505 by Nohemi Cisneros RN  Outcome: Progressing     Problem: Safety - Adult  Goal: Free from fall injury  3/10/2024 2206 by Ursula Dubon RN  Outcome: Progressing  3/10/2024 1505 by Nohemi Cisneros RN  Outcome: Progressing     Problem: ABCDS Injury Assessment  Goal: Absence of physical injury  3/10/2024 2206 by Ursula Dubon RN  Outcome: Progressing  3/10/2024 1505 by Nohemi Cisneros RN  Outcome: Progressing     Problem: Infection - Adult  Goal: Absence of infection at discharge  Outcome: Progressing  Flowsheets (Taken 3/10/2024 2152)  Absence of infection at discharge:   Assess and monitor for signs and symptoms of infection   Monitor lab/diagnostic results  Goal: Absence of infection during hospitalization  Outcome: Progressing  Flowsheets (Taken 3/10/2024 2152)  Absence of infection during hospitalization:   Assess and monitor for signs and symptoms of infection   Monitor lab/diagnostic results  Goal: Absence of fever/infection during anticipated neutropenic period  Outcome: Progressing  Flowsheets (Taken 3/10/2024 2152)  Absence of fever/infection during anticipated neutropenic period: Monitor white blood cell count

## 2024-03-11 NOTE — PLAN OF CARE
Problem: Discharge Planning  Goal: Discharge to home or other facility with appropriate resources  3/11/2024 1041 by Deena Sullivan RN  Outcome: Progressing  3/10/2024 2206 by Ursula Dubon RN  Outcome: Progressing  Flowsheets (Taken 3/10/2024 2152)  Discharge to home or other facility with appropriate resources:   Identify barriers to discharge with patient and caregiver   Arrange for needed discharge resources and transportation as appropriate   Identify discharge learning needs (meds, wound care, etc)     Problem: Pain  Goal: Verbalizes/displays adequate comfort level or baseline comfort level  3/11/2024 1041 by Deena Sullivan RN  Outcome: Progressing  3/10/2024 2206 by Ursula Dubon RN  Outcome: Progressing     Problem: Safety - Adult  Goal: Free from fall injury  3/11/2024 1041 by Deena Sullivan RN  Outcome: Progressing  3/10/2024 2206 by Ursula Dubon RN  Outcome: Progressing     Problem: ABCDS Injury Assessment  Goal: Absence of physical injury  3/11/2024 1041 by Deena Sullivan RN  Outcome: Progressing  3/10/2024 2206 by Ursula Dubon RN  Outcome: Progressing     Problem: Infection - Adult  Goal: Absence of infection at discharge  3/11/2024 1041 by Deena Sullivan RN  Outcome: Progressing  3/10/2024 2206 by Ursula Dubon RN  Outcome: Progressing  Flowsheets (Taken 3/10/2024 2152)  Absence of infection at discharge:   Assess and monitor for signs and symptoms of infection   Monitor lab/diagnostic results  Goal: Absence of infection during hospitalization  3/11/2024 1041 by Deena Sullivan RN  Outcome: Progressing  3/10/2024 2206 by Ursula Dubon RN  Outcome: Progressing  Flowsheets (Taken 3/10/2024 2152)  Absence of infection during hospitalization:   Assess and monitor for signs and symptoms of infection   Monitor lab/diagnostic results  Goal: Absence of fever/infection during anticipated neutropenic period  3/11/2024 1041 by Deena Sullivan  RN  Outcome: Progressing  3/10/2024 2206 by Ursula Dubon RN  Outcome: Progressing  Flowsheets (Taken 3/10/2024 2152)  Absence of fever/infection during anticipated neutropenic period: Monitor white blood cell count

## 2024-03-11 NOTE — DISCHARGE INSTR - DIET

## 2024-03-11 NOTE — PROGRESS NOTES
AVS reviewed with patient. Questions were answered. PICC line removed without complications. Transport set up with [facility] to house. Patient walked out. Discharge completed at 1820.

## 2024-03-11 NOTE — CARE COORDINATION
Chart reviewed- noted plan for aspiration today  Continues on IV Vanc- will follow w/ ID for final dc needs.  Electronically signed by Misa Feng on 3/11/2024 at 10:46 AM  #[phone]

## 2024-03-11 NOTE — DISCHARGE INSTR - COC
Serosanguinous 03/10/24 2152   Odor None 24 0803   Number of days: 5        Elimination:  Continence:   Bowel: {YES / NO:}  Bladder: {YES / NO:}  Urinary Catheter: {Urinary Catheter:389969714}   Colostomy/Ileostomy/Ileal Conduit: {YES / NO:}       Date of Last BM: ***    Intake/Output Summary (Last 24 hours) at 3/11/2024 1314  Last data filed at 3/10/2024 1912  Gross per 24 hour   Intake 300 ml   Output --   Net 300 ml     I/O last 3 completed shifts:  In: 1652.7 [P.O.:1015; IV Piggyback:637.7]  Out: -     Safety Concerns:     { LINA Safety Concerns:961175674}    Impairments/Disabilities:      { LINA Impairments/Disabilities:122700928}    Nutrition Therapy:  Current Nutrition Therapy:   { LINA Diet List:227814239}    Routes of Feeding: {[facility] Other Feedings:236957986}  Liquids: {Slp liquid thickness:83539}  Daily Fluid Restriction: {[facility] DME Yes amt example:453187942}  Last Modified Barium Swallow with Video (Video Swallowing Test): {Done Not Done Date:}    Treatments at the Time of Hospital Discharge:   Respiratory Treatments: ***  Oxygen Therapy:  {Therapy; copd oxygen:79258}  Ventilator:    { CC Vent List:936880613}    Rehab Therapies: {THERAPEUTIC INTERVENTION:9491182178}  Weight Bearing Status/Restrictions: {[facility] Weight Bearin}  Other Medical Equipment (for information only, NOT a DME order):  {EQUIPMENT:581981171}  Other Treatments: ***    Patient's personal belongings (please select all that are sent with patient):  {[facility] DME Belongings:165452015}    RN SIGNATURE:  {Esignature:258771246}    CASE MANAGEMENT/SOCIAL WORK SECTION    Inpatient Status Date: ***    Readmission Risk Assessment Score:  Readmission Risk              Risk of Unplanned Readmission:  34           Discharging to Facility/ Agency   Name:   Address:  Phone:  Fax:    Dialysis Facility (if applicable)   Name:  Address:  Dialysis Schedule:  Phone:  Fax:    / signature:  {Esignature:520652336}    PHYSICIAN SECTION    Prognosis: {Prognosis:7107158349}    Condition at Discharge: { Patient Condition:615537742}    Rehab Potential (if transferring to Rehab): {Prognosis:5408347036}    Recommended Labs or Other Treatments After Discharge: ***    Physician Certification: I certify the above information and transfer of Petrona Green  is necessary for the continuing treatment of the diagnosis listed and that she requires {Admit to Appropriate Level of Care:11858} for {GREATER/LESS:595486105} 30 days.     Update Admission H&P: {[facility] DME Changes in HandP:261786145}    PHYSICIAN SIGNATURE:  {Esignature:557222449}

## 2024-03-11 NOTE — PROGRESS NOTES
Discharge medications are ready in inpatient pharmacy for after hours delivery.    03/11/24 4:15 PM

## 2024-03-11 NOTE — PROGRESS NOTES
Infectious Disease Follow up Notes  Admit Date: 3/7/2024  Hospital Day: 5    Antibiotics :   IV Vancomycin  Clindamycin     CHIEF COMPLAINT:    Abdominal wall fluid collection   Ventral hernia surgery on 3/6/24  WBC elevation   H/o  shunt surgery   Antibiotic allergies     Subjective interval History :  43 y.o.old female with history of ADHD,  shunt in place, prediabetes and recent repair of incarcerated ventral hernia with mesh performed on 3/6  she  was admitted on 3/7 with complaints of fevers, nausea and worsening abdominal pain after returning home.  Upon presentation, WBC was 22.8 and she was afebrile.  CT of the abdomen and pelvis showed 2.4 x 2.5 cm fluid and gas containing periumbilical collection near the umbilicus.  Findings may represent postoperative hematoma or seroma or abscess    S/p IR aspiration of the fluid and cx in process WBC trend down and no local redness no fevers and tolerating IV Vancomycin well          Past Medical History:    Past Medical History:   Diagnosis Date    ADHD (attention deficit hyperactivity disorder) 1988    Depression with anxiety     Difficult intubation     airway swelled up    Drug-seeking behavior     Encounter for imaging to screen for metal prior to MRI 06/01/2021    MRI Conditional Med[employer]onic Non-Programmable shunt model#24537 implanted 10/30/2020 at [facility]. Normal Mode. 1.5T or 3.0T.    ESBL (extended spectrum beta-lactamase) producing bacteria infection 11/06/2019    urine    Functional ovarian cysts 2008    rt ovary cyst x 2 yrs.    GERD (gastroesophageal reflux disease) When I was a kid    Headache(784.0)     migraines    History of blood transfusion     at birth    History of kidney stones     History of PCOS     had hysterectomy in 2016    Hydrocephalus (HCC)     Hyperlipidemia     Irritable bowel syndrome 2004    had colonoscopy about 6 yrs ago    Meningitis  shunt status Z98.2    Scoliosis M41.9    Prediabetes R73.03    Tobacco smoker Z72.0    Anxiety F41.9    Onychomycosis B35.1    Rectal bleeding K62.5    Congenital hydrocephalus ([facility]) Q03.9    Sepsis ([facility]) A41.9    Ureteritis N28.89    Acute cystitis without hematuria N30.00    Urinary retention R33.9    Lactic acidosis E87.20    Leukocytosis D72.829    History of brain shunt Z98.2    Mood disorder ([facility]) F39    Numbness and tingling in left hand R20.0, R20.2    Diverticulosis K57.90    Colitis K52.9    Infection due to extended-spectrum beta-lactamase-producing Escherichia coli A49.8, Z16.12    Kidney stone N20.0    Intermittent small bowel obstruction due to adhesions ([facility]) K56.50    Abdominal wall mass R22.2    Cyst and pseudocyst of pancreas K86.2, K86.3    Abdominal pain in female R10.9    Nausea and vomiting R11.2    COVID U07.1    Abdominal wall cellulitis L03.311    DM2 (diabetes mellitus, type 2) ([facility]) E11.9    HTN (hypertension) I10    Cellulitis L03.90    History of extended-spectrum beta-lactamase producing Escherichia coli infection Z86.19    History of creation of ventriculoperitoneal shunt Z92.89    Allergy to multiple antibiotics Z88.1    Seizure-like activity ([facility]) R56.9    Multiple drug resistant organism (MDRO) culture positive Z16.24    Renal stones N20.0    Diverticulitis K57.92    Abdominal pain R10.9    Mixed hyperlipidemia E78.2    Seizure disorder ([facility]) G40.909    Abnormal EKG R94.31    Atypical chest pain R07.89    Tachycardia R00.0    SIRS (systemic inflammatory response syndrome) ([facility]) R65.10    Hyperglycemia due to type 2 diabetes mellitus ([facility]) E11.65    S/P  shunt Z98.2    Obesity with serious comorbidity E66.9    Near syncope R55    Diabetes education, encounter for Z71.89    Weight loss counseling, encounter for Z71.3    Incarcerated ventral hernia K43.6    Post-operative nausea and vomiting R11.2, Z98.890    Postoperative pain G89.18    Postoperative fever R50.82    Umbilical hernia

## 2024-03-12 ENCOUNTER — TELEPHONE (OUTPATIENT)
Dept: INTERNAL MEDICINE CLINIC | Age: 44
End: 2024-03-12

## 2024-03-12 PROBLEM — D72.9 NEUTROPHILIA: Status: ACTIVE | Noted: 2024-03-12

## 2024-03-12 PROBLEM — E66.01 MORBID OBESITY DUE TO EXCESS CALORIES (HCC): Status: ACTIVE | Noted: 2024-03-12

## 2024-03-12 PROBLEM — Z98.890 POSTOPERATIVE NAUSEA AND VOMITING: Status: ACTIVE | Noted: 2024-03-12

## 2024-03-12 PROBLEM — R11.2 POSTOPERATIVE NAUSEA AND VOMITING: Status: ACTIVE | Noted: 2024-03-12

## 2024-03-12 PROBLEM — D72.828 NEUTROPHILIA: Status: ACTIVE | Noted: 2024-03-12

## 2024-03-12 NOTE — TELEPHONE ENCOUNTER
Pt called, she was just released from the hospital from her hernia surgery, she has thrush and wants to know if something can be called in for her?    Please advise    Please send to [facility] in [place]    Thank you

## 2024-03-13 DIAGNOSIS — M54.16 LUMBAR BACK PAIN WITH RADICULOPATHY AFFECTING LEFT LOWER EXTREMITY: ICD-10-CM

## 2024-03-13 LAB
BACTERIA BLD CULT ORG #2: NORMAL
BACTERIA BLD CULT: NORMAL

## 2024-03-13 RX ORDER — GABAPENTIN 400 MG/1
400 CAPSULE ORAL 3 TIMES DAILY
Qty: 90 CAPSULE | Refills: 0 | Status: ON HOLD | OUTPATIENT
Start: 2024-03-13 | End: 2024-04-12

## 2024-03-13 NOTE — TELEPHONE ENCOUNTER
Future Appointments   Date Time Provider Department Center   3/22/2024 12:30 PM Preston Mloina, PhD [place] [facility]   3/26/2024 11:00 AM Dayron Beckwith, APRN - CNP  PSYCH [facility]     Last appointment 2/29/24

## 2024-03-14 NOTE — PROGRESS NOTES
Physician Progress Note      PATIENT:               CAROLINE MEJÍA  CSN #:                  [id number]  :                       1980  ADMIT DATE:       3/7/2024 4:44 PM  DISCH DATE:        3/11/2024 6:17 PM  RESPONDING  PROVIDER #:        Bo Marshall MD          QUERY TEXT:    Pt admitted with abdominal pain. Pt noted to have abdominal wall cellulitis   with leukocytosis, elevated lactic acid, and tachycardia. If possible, please   document in the progress notes and discharge summary if you are evaluating and   /or treating any of the following:      The medical record reflects the following:  Risk Factors: Recent hernia repair with repeat repair, congenital   hydrocephalus, obesity  Clinical Indicators: Wbc 22.8, pulse 100, lactic acid 2.5.  Per ID note   \"Admitted with Abd pain. Abd wall cellulitis\".  Treatment: IV Vancomycin and home Clindamycin, IVF, blood cultures, serial   labs, supportive care, ID consult    Thank you,  Sara Robbins RN BSN  Options provided:  -- Sepsis due to ABD wall cellulitis from recent ventral hernia repair  -- ABD wall cellulitis related to recent ventral hernia repair, no sepsis  -- Other - I will add my own diagnosis  -- Disagree - Not applicable / Not valid  -- Disagree - Clinically unable to determine / Unknown  -- Refer to Clinical Documentation Reviewer    PROVIDER RESPONSE TEXT:    Provider disagreed with this query.  no evidence of infection at operative site. postop erythema but not infection    Query created by: Sara Robbins on 3/14/2024 10:48 AM      Electronically signed by:  Bo Marshall MD 3/14/2024 11:06 AM

## 2024-03-15 ENCOUNTER — CARE COORDINATION (OUTPATIENT)
Dept: CARE COORDINATION | Age: 44
End: 2024-03-15

## 2024-03-15 NOTE — CARE COORDINATION
Care Transitions Initial Follow Up Call    Call within 2 business days of discharge: Yes     Patient: Petrona Green Patient : 1980 MRN: [id number]    Last Discharge Facility       Date Complaint Diagnosis Description Type Department Provider    3/7/24 Post-op Problem Postoperative nausea and vomiting ... ED to Hosp-Admission (Discharged) (ADMITTED) [facility] Joanna, Bo Mckeon MD; Mar...            RARS: Readmission Risk Score: 15.4       Spoke with: patient    Discharge department/facility: [facility] ER    Non-face-to-face services provided:  Obtained and reviewed discharge summary and/or continuity of care documents  Reviewed and followed up on pending diagnostic tests and treatments-from IP     Follow Up  Future Appointments   Date Time Provider Department Center   3/22/2024 12:30 PM Preston Molina, PhD [place] [facility]   3/26/2024 11:00 AM Dayron Beckwith, APRN - CNP  PS[facility]       Evonne Jeter RN

## 2024-03-16 ENCOUNTER — TELEPHONE (OUTPATIENT)
Dept: SURGERY | Age: 44
End: 2024-03-16

## 2024-03-16 ENCOUNTER — APPOINTMENT (OUTPATIENT)
Dept: CT IMAGING | Age: 44
DRG: 383 | End: 2024-03-16
Payer: COMMERCIAL

## 2024-03-16 ENCOUNTER — HOSPITAL ENCOUNTER (INPATIENT)
Age: 44
LOS: 1 days | Discharge: HOME | DRG: 383 | End: 2024-03-18
Attending: HOSPITALIST | Admitting: HOSPITALIST
Payer: COMMERCIAL

## 2024-03-16 DIAGNOSIS — A41.9 SEPTICEMIA (HCC): ICD-10-CM

## 2024-03-16 DIAGNOSIS — R18.8 INTRA-ABDOMINAL FLUID COLLECTION: ICD-10-CM

## 2024-03-16 DIAGNOSIS — L03.311 ABDOMINAL WALL CELLULITIS: Primary | ICD-10-CM

## 2024-03-16 DIAGNOSIS — R10.33 PERIUMBILICAL ABDOMINAL PAIN: ICD-10-CM

## 2024-03-16 LAB
ANION GAP SERPL CALCULATED.3IONS-SCNC: 15 MMOL/L (ref 3–16)
BACTERIA SPEC AEROBE CULT: NORMAL
BACTERIA SPEC ANAEROBE CULT: NORMAL
BASOPHILS # BLD: 0.2 K/UL (ref 0–0.2)
BASOPHILS NFR BLD: 1.2 %
BUN SERPL-MCNC: 13 MG/DL (ref 7–20)
CALCIUM SERPL-MCNC: 9.8 MG/DL (ref 8.3–10.6)
CHLORIDE SERPL-SCNC: 103 MMOL/L (ref 99–110)
CO2 SERPL-SCNC: 22 MMOL/L (ref 21–32)
CREAT SERPL-MCNC: 0.6 MG/DL (ref 0.6–1.1)
DEPRECATED RDW RBC AUTO: 14.7 % (ref 12.4–15.4)
EOSINOPHIL # BLD: 0.1 K/UL (ref 0–0.6)
EOSINOPHIL NFR BLD: 1 %
GFR SERPLBLD CREATININE-BSD FMLA CKD-EPI: >60 ML/MIN/{1.73_M2}
GLUCOSE SERPL-MCNC: 120 MG/DL (ref 70–99)
GRAM STN SPEC: NORMAL
HCT VFR BLD AUTO: 43.8 % (ref 36–48)
HGB BLD-MCNC: 14.8 G/DL (ref 12–16)
LACTATE BLDV-SCNC: 1.7 MMOL/L (ref 0.4–1.9)
LACTATE BLDV-SCNC: 2.1 MMOL/L (ref 0.4–1.9)
LYMPHOCYTES # BLD: 2.5 K/UL (ref 1–5.1)
LYMPHOCYTES NFR BLD: 17.4 %
MCH RBC QN AUTO: 28.9 PG (ref 26–34)
MCHC RBC AUTO-ENTMCNC: 33.7 G/DL (ref 31–36)
MCV RBC AUTO: 85.5 FL (ref 80–100)
MONOCYTES # BLD: 0.7 K/UL (ref 0–1.3)
MONOCYTES NFR BLD: 5.1 %
NEUTROPHILS # BLD: 10.9 K/UL (ref 1.7–7.7)
NEUTROPHILS NFR BLD: 75.3 %
PLATELET # BLD AUTO: 259 K/UL (ref 135–450)
PMV BLD AUTO: 7.8 FL (ref 5–10.5)
POTASSIUM SERPL-SCNC: 3.7 MMOL/L (ref 3.5–5.1)
PROCALCITONIN SERPL IA-MCNC: 0.06 NG/ML (ref 0–0.15)
RBC # BLD AUTO: 5.12 M/UL (ref 4–5.2)
SODIUM SERPL-SCNC: 140 MMOL/L (ref 136–145)
WBC # BLD AUTO: 14.4 K/UL (ref 4–11)

## 2024-03-16 PROCEDURE — 84145 PROCALCITONIN (PCT): CPT

## 2024-03-16 PROCEDURE — 99285 EMERGENCY DEPT VISIT HI MDM: CPT

## 2024-03-16 PROCEDURE — 6370000000 HC RX 637 (ALT 250 FOR IP): Performed by: NURSE PRACTITIONER

## 2024-03-16 PROCEDURE — 74177 CT ABD & PELVIS W/CONTRAST: CPT

## 2024-03-16 PROCEDURE — 6360000004 HC RX CONTRAST MEDICATION: Performed by: NURSE PRACTITIONER

## 2024-03-16 PROCEDURE — 85025 COMPLETE CBC W/AUTO DIFF WBC: CPT

## 2024-03-16 PROCEDURE — 80048 BASIC METABOLIC PNL TOTAL CA: CPT

## 2024-03-16 PROCEDURE — 87040 BLOOD CULTURE FOR BACTERIA: CPT

## 2024-03-16 PROCEDURE — 2580000003 HC RX 258: Performed by: NURSE PRACTITIONER

## 2024-03-16 PROCEDURE — 6360000002 HC RX W HCPCS: Performed by: NURSE PRACTITIONER

## 2024-03-16 PROCEDURE — 96376 TX/PRO/DX INJ SAME DRUG ADON: CPT

## 2024-03-16 PROCEDURE — 96365 THER/PROPH/DIAG IV INF INIT: CPT

## 2024-03-16 PROCEDURE — 96375 TX/PRO/DX INJ NEW DRUG ADDON: CPT

## 2024-03-16 PROCEDURE — 36415 COLL VENOUS BLD VENIPUNCTURE: CPT

## 2024-03-16 PROCEDURE — 83605 ASSAY OF LACTIC ACID: CPT

## 2024-03-16 RX ORDER — CLINDAMYCIN PHOSPHATE 600 MG/50ML
600 INJECTION, SOLUTION INTRAVENOUS EVERY 8 HOURS
Status: CANCELLED | OUTPATIENT
Start: 2024-03-16

## 2024-03-16 RX ORDER — HYDROCODONE BITARTRATE AND ACETAMINOPHEN 5; 325 MG/1; MG/1
2 TABLET ORAL EVERY 4 HOURS PRN
Status: DISCONTINUED | OUTPATIENT
Start: 2024-03-16 | End: 2024-03-18 | Stop reason: HOSPADM

## 2024-03-16 RX ORDER — SODIUM CHLORIDE, SODIUM LACTATE, POTASSIUM CHLORIDE, AND CALCIUM CHLORIDE .6; .31; .03; .02 G/100ML; G/100ML; G/100ML; G/100ML
1500 INJECTION, SOLUTION INTRAVENOUS ONCE
Status: COMPLETED | OUTPATIENT
Start: 2024-03-16 | End: 2024-03-17

## 2024-03-16 RX ORDER — HYDROCODONE BITARTRATE AND ACETAMINOPHEN 5; 325 MG/1; MG/1
1 TABLET ORAL EVERY 4 HOURS PRN
Status: DISCONTINUED | OUTPATIENT
Start: 2024-03-16 | End: 2024-03-18 | Stop reason: HOSPADM

## 2024-03-16 RX ORDER — CLINDAMYCIN PHOSPHATE 600 MG/50ML
600 INJECTION, SOLUTION INTRAVENOUS ONCE
Status: COMPLETED | OUTPATIENT
Start: 2024-03-16 | End: 2024-03-16

## 2024-03-16 RX ORDER — SODIUM CHLORIDE, SODIUM LACTATE, POTASSIUM CHLORIDE, CALCIUM CHLORIDE 600; 310; 30; 20 MG/100ML; MG/100ML; MG/100ML; MG/100ML
INJECTION, SOLUTION INTRAVENOUS CONTINUOUS
Status: DISCONTINUED | OUTPATIENT
Start: 2024-03-16 | End: 2024-03-18 | Stop reason: HOSPADM

## 2024-03-16 RX ORDER — HYDROCODONE BITARTRATE AND ACETAMINOPHEN 5; 325 MG/1; MG/1
1 TABLET ORAL ONCE
Status: COMPLETED | OUTPATIENT
Start: 2024-03-16 | End: 2024-03-16

## 2024-03-16 RX ORDER — MORPHINE SULFATE 2 MG/ML
2 INJECTION, SOLUTION INTRAMUSCULAR; INTRAVENOUS ONCE
Status: DISCONTINUED | OUTPATIENT
Start: 2024-03-16 | End: 2024-03-16

## 2024-03-16 RX ORDER — ONDANSETRON 2 MG/ML
4 INJECTION INTRAMUSCULAR; INTRAVENOUS ONCE
Status: COMPLETED | OUTPATIENT
Start: 2024-03-16 | End: 2024-03-16

## 2024-03-16 RX ADMIN — HYDROCODONE BITARTRATE AND ACETAMINOPHEN 1 TABLET: 5; 325 TABLET ORAL at 20:58

## 2024-03-16 RX ADMIN — ONDANSETRON 4 MG: 2 INJECTION INTRAMUSCULAR; INTRAVENOUS at 20:58

## 2024-03-16 RX ADMIN — SODIUM CHLORIDE, POTASSIUM CHLORIDE, SODIUM LACTATE AND CALCIUM CHLORIDE 1500 ML: 600; 310; 30; 20 INJECTION, SOLUTION INTRAVENOUS at 23:39

## 2024-03-16 RX ADMIN — SODIUM CHLORIDE, POTASSIUM CHLORIDE, SODIUM LACTATE AND CALCIUM CHLORIDE: 600; 310; 30; 20 INJECTION, SOLUTION INTRAVENOUS at 21:48

## 2024-03-16 RX ADMIN — IOPAMIDOL 75 ML: 755 INJECTION, SOLUTION INTRAVENOUS at 21:25

## 2024-03-16 RX ADMIN — CLINDAMYCIN PHOSPHATE 600 MG: 600 INJECTION, SOLUTION INTRAVENOUS at 21:18

## 2024-03-16 ASSESSMENT — PAIN - FUNCTIONAL ASSESSMENT: PAIN_FUNCTIONAL_ASSESSMENT: 0-10

## 2024-03-16 ASSESSMENT — PAIN SCALES - GENERAL: PAINLEVEL_OUTOF10: 8

## 2024-03-16 NOTE — ED PROVIDER NOTES
[facility] EMERGENCY DEPARTMENT  [address] [address]  Dept: [phone]  Loc: [phone]  eMERGENCYdEPARTMENT eNCOUnter      Pt Name: Petrona Green  MRN: [id number]  Birthdate 1980  Date of evaluation: 3/16/2024  Provider:FREDRICK Zarco - AARON      Independently seen and evaluated by the advanced practice  CHIEF COMPLAINT       Chief Complaint   Patient presents with    Cellulitis     States was recently admitted for cellulitis following hernia repair, has not been able to keep abx down at home and has increased redness on abdomen.       CRITICAL CARE TIME       HISTORY OF PRESENT ILLNESS  (Location/Symptom, Timing/Onset, Context/Setting, Quality, Duration,Modifying Factors, Severity.)   Petrona Green is a 43 y.o. female who presents to the emergency department PMHx extensive both medical and surgical history listed    Lives at home  CODE STATUS full  Anticoagulation therapy none  Social determinants: Disabled    HPI provided by the patient    Patient presents to the emergency department ambulatory reporting nausea vomiting and inability to tolerate oral antibiotic with the return of redness on the abdomen.  States that she notified the on-call general surgeon who directed her to return to the emergency department.  Patient was discharged home on clindamycin oral    Hospital admission: 3/7-> 3/8: Postop nausea  Hospital admission 3/6: Abdominal hernia repair with same-day discharge  Surgeon Dr. oB Marshall    Nursing Notes were reviewedand agreed with or any disagreements were addressed in the HPI.    REVIEW OF SYSTEMS    (2-9 systems for level 4, 10 or more for level 5)     Review of Systems   Constitutional: Negative.    HENT: Negative.     Respiratory: Negative.     Cardiovascular: Negative.    Gastrointestinal:  Positive for nausea and vomiting.   Genitourinary: Negative.    Skin:         Abdominal redness   Neurological:  for Severe Sepsis.   Must meet 1:    [] Lactate > 4        or   [] SBP < 90 or MAP < 65 for at        least two readings in the first        hour after fluid bolus        administration      [] Vasopressors initiated (if hypotension persists after fluid resuscitation)        [] No criteria met for Septic Shock.   Patient Vitals for the past 6 hrs:   BP Temp Pulse Resp SpO2 Weight Percent Weight Change   03/16/24 1839 -- 98.9 °F (37.2 °C) 85 16 98 % -- --   03/16/24 1841 (!) 150/84 -- -- -- -- 83.1 kg (183 lb 3.2 oz) 0   03/16/24 2000 -- -- (!) 102 27 93 % -- --   03/16/24 2015 129/89 -- 91 18 98 % -- --   03/16/24 2030 (!) 123/90 -- 96 21 100 % -- --   03/16/24 2045 124/79 -- 93 19 -- -- --   03/16/24 2100 -- -- 95 26 96 % -- --   03/16/24 2115 (!) 116/90 -- 84 18 98 % -- --   03/16/24 2130 124/67 -- 89 20 98 % -- --   03/16/24 2145 (!) 123/93 -- 94 18 100 % -- --   03/16/24 2200 110/66 -- 93 17 95 % -- --   03/16/24 2215 123/89 -- 92 21 97 % -- --      Recent Labs     03/16/24 2050   WBC 14.4*   CREATININE 0.6            Time 2130:  Identified: Severe sepsis    Fluid Resuscitation Rational: at least 30mL/kg based on entered actual weight at time of triage      Repeat lactate level: ordered and pending at this time    Reassessment Exam:   I have reassessed tissue perfusion and hemodynamic status after fluid bolus at this time: 2200 patient resting comfortably.  Skin warm and dry.  Abdominal exam is unchanged.  No tachycardia.  No hypotension.  No change in mental status.  Respirations are easy regular nonlabored    2300: Dr. Cordoba from general surgery called into the emergency department to speak to another provider about another patient and while waiting to talk to that provider he and I did discuss the patient's clinical presentation and known history and he agrees with my current plan.  He will place the patient on his list for rounding and follow-up tomorrow.      CONSULTS:  IP CONSULT TO

## 2024-03-16 NOTE — PROGRESS NOTES
Spoke with patient via phone. She was recently readmitted after hernia surgery. She was found to have a lactic acidosis and significant leukocytosis at that time.    CT revealed a fluid collection adjacent to the recently placed hernia mesh. This was subsequently aspirated and has not grown any bacteria to date. Thus, there is really no strong evidence for a surgical site infection.    She was subsequently discharged on oral cli ndamycin for empiric coverage, given the fact that she has an underlying  shunt.     She complains of the same symptoms, including skin, redness, extending to the right side of her abdomen, tachycardia (per Apple Watch), lightheadedness, fatigue, nausea and vomiting with inability to tolerate oral antibiotics at home.    Given this information, I recommended she returned to the emergency department for evaluation, including bl oodwork, and likely initiation of IV fluid resuscitation and IV antibiotic therapy. Will need to engage ID given her multiple antibiotic, intolerances and  shunt.     This information was communicated to the ED provider via phone    Michael Rodas MD

## 2024-03-17 PROCEDURE — 6360000002 HC RX W HCPCS: Performed by: NURSE PRACTITIONER

## 2024-03-17 PROCEDURE — 6360000002 HC RX W HCPCS: Performed by: HOSPITALIST

## 2024-03-17 PROCEDURE — 6370000000 HC RX 637 (ALT 250 FOR IP): Performed by: INTERNAL MEDICINE

## 2024-03-17 PROCEDURE — 2580000003 HC RX 258: Performed by: HOSPITALIST

## 2024-03-17 PROCEDURE — 6360000002 HC RX W HCPCS: Performed by: INTERNAL MEDICINE

## 2024-03-17 PROCEDURE — 99024 POSTOP FOLLOW-UP VISIT: CPT | Performed by: STUDENT IN AN ORGANIZED HEALTH CARE EDUCATION/TRAINING PROGRAM

## 2024-03-17 PROCEDURE — 1200000000 HC SEMI PRIVATE

## 2024-03-17 PROCEDURE — 6360000002 HC RX W HCPCS: Performed by: STUDENT IN AN ORGANIZED HEALTH CARE EDUCATION/TRAINING PROGRAM

## 2024-03-17 PROCEDURE — 99222 1ST HOSP IP/OBS MODERATE 55: CPT | Performed by: INTERNAL MEDICINE

## 2024-03-17 RX ORDER — CLINDAMYCIN PHOSPHATE 600 MG/50ML
600 INJECTION, SOLUTION INTRAVENOUS EVERY 8 HOURS
Status: DISCONTINUED | OUTPATIENT
Start: 2024-03-17 | End: 2024-03-18 | Stop reason: HOSPADM

## 2024-03-17 RX ORDER — ACETAMINOPHEN 325 MG/1
650 TABLET ORAL EVERY 6 HOURS PRN
Status: DISCONTINUED | OUTPATIENT
Start: 2024-03-17 | End: 2024-03-18 | Stop reason: HOSPADM

## 2024-03-17 RX ORDER — ONDANSETRON 4 MG/1
4 TABLET, ORALLY DISINTEGRATING ORAL EVERY 8 HOURS PRN
Status: DISCONTINUED | OUTPATIENT
Start: 2024-03-17 | End: 2024-03-18 | Stop reason: HOSPADM

## 2024-03-17 RX ORDER — PROMETHAZINE HYDROCHLORIDE 25 MG/ML
12.5 INJECTION, SOLUTION INTRAMUSCULAR; INTRAVENOUS EVERY 6 HOURS PRN
Status: DISCONTINUED | OUTPATIENT
Start: 2024-03-17 | End: 2024-03-17

## 2024-03-17 RX ORDER — POLYETHYLENE GLYCOL 3350 17 G/17G
17 POWDER, FOR SOLUTION ORAL DAILY PRN
Status: DISCONTINUED | OUTPATIENT
Start: 2024-03-17 | End: 2024-03-18 | Stop reason: HOSPADM

## 2024-03-17 RX ORDER — SCOLOPAMINE TRANSDERMAL SYSTEM 1 MG/1
1 PATCH, EXTENDED RELEASE TRANSDERMAL
Status: DISCONTINUED | OUTPATIENT
Start: 2024-03-17 | End: 2024-03-18 | Stop reason: HOSPADM

## 2024-03-17 RX ORDER — SODIUM CHLORIDE 0.9 % (FLUSH) 0.9 %
5-40 SYRINGE (ML) INJECTION PRN
Status: DISCONTINUED | OUTPATIENT
Start: 2024-03-17 | End: 2024-03-18 | Stop reason: HOSPADM

## 2024-03-17 RX ORDER — MAGNESIUM SULFATE IN WATER 40 MG/ML
2000 INJECTION, SOLUTION INTRAVENOUS PRN
Status: DISCONTINUED | OUTPATIENT
Start: 2024-03-17 | End: 2024-03-18 | Stop reason: HOSPADM

## 2024-03-17 RX ORDER — ONDANSETRON 2 MG/ML
4 INJECTION INTRAMUSCULAR; INTRAVENOUS EVERY 6 HOURS PRN
Status: DISCONTINUED | OUTPATIENT
Start: 2024-03-17 | End: 2024-03-18 | Stop reason: HOSPADM

## 2024-03-17 RX ORDER — LANOLIN ALCOHOL/MO/W.PET/CERES
3 CREAM (GRAM) TOPICAL NIGHTLY
Status: DISCONTINUED | OUTPATIENT
Start: 2024-03-17 | End: 2024-03-18 | Stop reason: HOSPADM

## 2024-03-17 RX ORDER — SODIUM CHLORIDE 0.9 % (FLUSH) 0.9 %
5-40 SYRINGE (ML) INJECTION EVERY 12 HOURS SCHEDULED
Status: DISCONTINUED | OUTPATIENT
Start: 2024-03-17 | End: 2024-03-18 | Stop reason: HOSPADM

## 2024-03-17 RX ORDER — ENOXAPARIN SODIUM 100 MG/ML
40 INJECTION SUBCUTANEOUS DAILY
Status: DISCONTINUED | OUTPATIENT
Start: 2024-03-17 | End: 2024-03-18 | Stop reason: HOSPADM

## 2024-03-17 RX ORDER — POTASSIUM CHLORIDE 7.45 MG/ML
10 INJECTION INTRAVENOUS PRN
Status: DISCONTINUED | OUTPATIENT
Start: 2024-03-17 | End: 2024-03-18 | Stop reason: HOSPADM

## 2024-03-17 RX ORDER — ACETAMINOPHEN 650 MG/1
650 SUPPOSITORY RECTAL EVERY 6 HOURS PRN
Status: DISCONTINUED | OUTPATIENT
Start: 2024-03-17 | End: 2024-03-18 | Stop reason: HOSPADM

## 2024-03-17 RX ORDER — POTASSIUM CHLORIDE 20 MEQ/1
40 TABLET, EXTENDED RELEASE ORAL PRN
Status: DISCONTINUED | OUTPATIENT
Start: 2024-03-17 | End: 2024-03-18 | Stop reason: HOSPADM

## 2024-03-17 RX ORDER — SODIUM CHLORIDE 9 MG/ML
INJECTION, SOLUTION INTRAVENOUS PRN
Status: DISCONTINUED | OUTPATIENT
Start: 2024-03-17 | End: 2024-03-18 | Stop reason: HOSPADM

## 2024-03-17 RX ADMIN — CLINDAMYCIN PHOSPHATE 600 MG: 600 INJECTION, SOLUTION INTRAVENOUS at 14:23

## 2024-03-17 RX ADMIN — HYDROMORPHONE HYDROCHLORIDE 0.25 MG: 1 INJECTION, SOLUTION INTRAMUSCULAR; INTRAVENOUS; SUBCUTANEOUS at 12:16

## 2024-03-17 RX ADMIN — ENOXAPARIN SODIUM 40 MG: 100 INJECTION SUBCUTANEOUS at 08:58

## 2024-03-17 RX ADMIN — CLINDAMYCIN PHOSPHATE 600 MG: 600 INJECTION, SOLUTION INTRAVENOUS at 03:42

## 2024-03-17 RX ADMIN — CLINDAMYCIN PHOSPHATE 600 MG: 600 INJECTION, SOLUTION INTRAVENOUS at 20:09

## 2024-03-17 RX ADMIN — ONDANSETRON 4 MG: 2 INJECTION INTRAMUSCULAR; INTRAVENOUS at 20:03

## 2024-03-17 RX ADMIN — ONDANSETRON 4 MG: 2 INJECTION INTRAMUSCULAR; INTRAVENOUS at 08:55

## 2024-03-17 RX ADMIN — HYDROMORPHONE HYDROCHLORIDE 0.25 MG: 1 INJECTION, SOLUTION INTRAMUSCULAR; INTRAVENOUS; SUBCUTANEOUS at 20:03

## 2024-03-17 RX ADMIN — Medication 25 MG: at 12:21

## 2024-03-17 RX ADMIN — HYDROMORPHONE HYDROCHLORIDE 0.25 MG: 1 INJECTION, SOLUTION INTRAMUSCULAR; INTRAVENOUS; SUBCUTANEOUS at 16:27

## 2024-03-17 RX ADMIN — ONDANSETRON 4 MG: 2 INJECTION INTRAMUSCULAR; INTRAVENOUS at 00:12

## 2024-03-17 RX ADMIN — Medication 10 ML: at 08:55

## 2024-03-17 RX ADMIN — SODIUM CHLORIDE, POTASSIUM CHLORIDE, SODIUM LACTATE AND CALCIUM CHLORIDE: 600; 310; 30; 20 INJECTION, SOLUTION INTRAVENOUS at 07:26

## 2024-03-17 RX ADMIN — HYDROMORPHONE HYDROCHLORIDE 0.25 MG: 1 INJECTION, SOLUTION INTRAMUSCULAR; INTRAVENOUS; SUBCUTANEOUS at 03:42

## 2024-03-17 RX ADMIN — SODIUM CHLORIDE, POTASSIUM CHLORIDE, SODIUM LACTATE AND CALCIUM CHLORIDE: 600; 310; 30; 20 INJECTION, SOLUTION INTRAVENOUS at 16:34

## 2024-03-17 ASSESSMENT — PAIN DESCRIPTION - LOCATION
LOCATION: ABDOMEN

## 2024-03-17 ASSESSMENT — PAIN DESCRIPTION - PAIN TYPE: TYPE: ACUTE PAIN;SURGICAL PAIN

## 2024-03-17 ASSESSMENT — PAIN SCALES - GENERAL
PAINLEVEL_OUTOF10: 8
PAINLEVEL_OUTOF10: 9
PAINLEVEL_OUTOF10: 8
PAINLEVEL_OUTOF10: 3
PAINLEVEL_OUTOF10: 9
PAINLEVEL_OUTOF10: 9

## 2024-03-17 ASSESSMENT — PAIN DESCRIPTION - ORIENTATION
ORIENTATION: MID;RIGHT;LEFT;LOWER
ORIENTATION: RIGHT

## 2024-03-17 ASSESSMENT — PAIN DESCRIPTION - FREQUENCY: FREQUENCY: INTERMITTENT

## 2024-03-17 ASSESSMENT — PAIN DESCRIPTION - DESCRIPTORS
DESCRIPTORS: SHARP
DESCRIPTORS: SHARP;DISCOMFORT

## 2024-03-17 ASSESSMENT — PAIN DESCRIPTION - ONSET: ONSET: ON-GOING

## 2024-03-17 NOTE — PROGRESS NOTES
D: pt refusing im phenergan (piggyback unavailable at this time), but continues to complain of nausea. She is willing to try a scopolamine patch. A: this RN sent secure message to Dr. Roblero concerning this R: will continue to monitor pt

## 2024-03-17 NOTE — PLAN OF CARE
Problem: Discharge Planning  Goal: Discharge to home or other facility with appropriate resources  Outcome: Progressing  Flowsheets (Taken 3/17/2024 0137)  Discharge to home or other facility with appropriate resources: Identify barriers to discharge with patient and caregiver     Problem: Pain  Goal: Verbalizes/displays adequate comfort level or baseline comfort level  Outcome: Progressing     Problem: Safety - Adult  Goal: Free from fall injury  Outcome: Progressing     Problem: ABCDS Injury Assessment  Goal: Absence of physical injury  Outcome: Progressing     Problem: Skin/Tissue Integrity - Adult  Goal: Skin integrity remains intact  Outcome: Progressing     Problem: Skin/Tissue Integrity - Adult  Goal: Incisions, wounds, or drain sites healing without S/S of infection  Outcome: Progressing     Problem: Gastrointestinal - Adult  Goal: Minimal or absence of nausea and vomiting  Outcome: Progressing     Problem: Gastrointestinal - Adult  Goal: Maintains or returns to baseline bowel function  Outcome: Progressing     Problem: Gastrointestinal - Adult  Goal: Maintains adequate nutritional intake  Outcome: Progressing     Problem: Infection - Adult  Goal: Absence of infection at discharge  Outcome: Progressing  Flowsheets (Taken 3/17/2024 0137)  Absence of infection at discharge:   Assess and monitor for signs and symptoms of infection   Monitor lab/diagnostic results     Problem: Infection - Adult  Goal: Absence of infection during hospitalization  Outcome: Progressing  Flowsheets (Taken 3/17/2024 0137)  Absence of infection during hospitalization:   Assess and monitor for signs and symptoms of infection   Monitor lab/diagnostic results

## 2024-03-17 NOTE — PROGRESS NOTES
Patient admitted to room 4131 from ED. VSS on room air. Patient gait steady. Patient oriented to room. Patient complains of nausea, states pain in abdomen 9 out of 10 on right side. Abdomen is red, incision has no drainage. Safety precautions set in place.     Electronically signed by Hamida Ren RN on 3/17/2024 at 1:46 AM

## 2024-03-17 NOTE — PROGRESS NOTES
4 Eyes Skin Assessment     NAME:  Petrona Green  YOB: 1980  MEDICAL RECORD NUMBER:  [id number]    The patient is being assessed for  Admission    I agree that at least one RN has performed a thorough Head to Toe Skin Assessment on the patient. ALL assessment sites listed below have been assessed.      Areas assessed by both nurses:    Head, Face, Ears, Shoulders, Back, Chest, Arms, Elbows, Hands, Sacrum. Buttock, Coccyx, Ischium, Legs. Feet and Heels, and Under Medical Devices         Does the Patient have a Wound? No noted wound(s)       Noel Prevention initiated by RN: No  Wound Care Orders initiated by RN: No    Pressure Injury (Stage 3,4, Unstageable, DTI, NWPT, and Complex wounds) if present, place Wound referral order by RN under : No    New Ostomies, if present place, Ostomy referral order under : No     Nurse 1 eSignature: Electronically signed by Hamida Ren RN on 3/17/24 at 3:26 AM EDT    **SHARE this note so that the co-signing nurse can place an eSignature**    Nurse 2 eSignature: Electronically signed by Monae Alarcon RN on 3/17/24 at 3:27 AM EDT

## 2024-03-17 NOTE — PROGRESS NOTES
D: pt complaining of nausea this am and abdominal pain. Pt given zofran for nausea and norco offered for pain management, but pt feels as if she will throw up oral medication at this point and is asking that this RN speak to physician about pain management. A: Dr. Roblero rounding on unit and this RN shared assessment/communication findings with physician R: Dr. Roblero to round on pt and discuss plan of care.

## 2024-03-17 NOTE — PROGRESS NOTES
D: pt complaining of pain and nausea, unable to tolerate po medications at this time per pt. This RN is working with physicians to increase nausea medications and per pt, she had a conversation with General Surgeon this morning concerning pain management and he said that he will add iv pain medication. Pt is asking that I call General Surgeon. A: this RN paged Dr. Aguilera concerning communication findings R: Dr. Aguilera ordered 0.25 mg of dilaudid q3

## 2024-03-17 NOTE — PROGRESS NOTES
Patient complains of abdominal pain pain 9 out of 10. Secure message sent to Delisa Alonso NP. PRN one time dose ordered for Dilauded. See MAR.

## 2024-03-17 NOTE — PLAN OF CARE
Problem: Discharge Planning  Goal: Discharge to home or other facility with appropriate resources  3/17/2024 1636 by Traci Tyler, RN  Outcome: Progressing

## 2024-03-17 NOTE — H&P
hydrocephalus f/w Neurosurgeon at     Wears glasses     reading     PSHX:  has a past surgical history that includes Appendectomy (); Colonoscopy (); Colposcopy ();  section (); csf shunt; Cystoscopy; Knee arthroscopy (Left, 2015); Cholecystectomy; other surgical history (10/19/2016); Ventriculoperitoneal shunt; Hysterectomy, total abdominal (2016); Lithotripsy (Bilateral, 12/10/2019); hernia repair (Bilateral, ); hernia repair (); Hemorrhoid surgery; Abdomen surgery (N/A, 2021); ventral hernia repair (N/A, 2022); Upper gastrointestinal endoscopy (N/A, 04/10/2023); Colonoscopy (N/A, 04/10/2023); knee surgery; Upper gastrointestinal endoscopy (N/A, 2023); Upper gastrointestinal endoscopy (N/A, 2023); Colonoscopy (N/A, 2023); ERCP (N/A, 2024); ERCP (N/A, 2024); ventral hernia repair (N/A, 3/6/2024); and CT DRAINAGE VISCERAL PERCUTANEOUS (3/11/2024).  Allergies:   Allergies   Allergen Reactions    Bee Venom Shortness Of Breath and Swelling    Bentyl [Dicyclomine Hcl] Shortness Of Breath and Anxiety    Dicyclomine Anxiety and Shortness Of Breath    Ketorolac Tromethamine Hives and Itching       Tolerates PO NSAIDs fine    Levofloxacin Anxiety and Hives    Maitake Anaphylaxis    Shiitake Mushroom Anaphylaxis     Can not eat mushrooms    Sulfa Antibiotics Shortness Of Breath    Zosyn [Piperacillin Sod-Tazobactam So] Hives, Shortness Of Breath, Nausea Only and Dizziness or Vertigo    Adhesive Tape Dermatitis     Other reaction(s): Dermatitis    Sulfacetamide Hives    Acetaminophen Anxiety     Patient reports when taking acetaminophen (including Norco/Percocet) she gets severe anxiety when taking this medication.     Butalbital-Apap-Caff-Cod Anxiety    Ceftaroline Rash     Does tolerate cefepime and has had doses before, confirmed 10/9/2022 with pharmacy    Daptomycin Swelling and Rash    Haldol [Haloperidol] Anxiety    Keppra  Medical Condition (MA) Reason for Exam: s/p hernia repair w/ evidence currently of abdominal cellulitis FINDINGS: Lower Chest: Lung bases are clear. Organs: The liver, pancreas, spleen and adrenal glands are without focal abnormality.  Status post cholecystectomy.  The kidneys enhance symmetrically.  There is a 3 mm nonobstructing right renal calculus.  No hydronephrosis or perinephric stranding. GI/Bowel: No dilated loops of bowel or bowel wall thickening.  No free air. Scattered colonic diverticula without acute diverticulitis.  The appendix is not visualized. Pelvis: No bladder wall thickening.  Status post hysterectomy.  No pathologically enlarged adenopathy or significant free fluid. Ventriculoperitoneal shunt is noted in the left lower quadrant. Peritoneum/Retroperitoneum: The aorta is normal caliber.  The celiac axis, SMA and SABRINA are patent.  The portal venous system is patent.  No pathologically enlarged adenopathy.  No ascites or drainable fluid collection. Bones/Soft Tissues: Again noted is a midline fluid collection measuring 3.8 x 3.1 x 4.9 cm slightly increased in size from 03/07/2024 with resolution of internal gas.  No acute osseous abnormality.     1. 3.8 x 3.1 x 4.9 cm fluid collection within the midline subcutaneous soft tissues without gas.  This is slightly increased in size when compared to 03/07/2024. 2. No acute abdominal or pelvic abnormality is otherwise noted. 3. Nonobstructing left renal calculus.     VL Extremity Venous Right    Result Date: 3/11/2024  Lower Extremities DVT Study  Demographics   Patient Name        ALFONZO HART   Date of Study       03/11/2024  Gender                  Female   Patient Number      [id number]  Date of Birth           1980   Visit Number        [id number]   Age                     43 year(s)   Accession Number    [id number]  Room Number             4108   Corporate ID               Sonographer             Ananya Graham RVT   Ordering Physician

## 2024-03-17 NOTE — CONSULTS
Infectious Diseases Inpatient Consult Note      Reason for Consult:  Abdominal wall cellulitis and fluid collection     Requesting Physician:  Dr. Roblero      Primary Care Physician:  Linda Fox APRN    History Obtained From:  Epic and pt     CHIEF COMPLAINT:     Chief Complaint   Patient presents with    Cellulitis     States was recently admitted for cellulitis following hernia repair, has not been able to keep abx down at home and has increased redness on abdomen.         HISTORY OF PRESENT ILLNESS:  43 y.o. woman with significant history for ADHD, history of MDR infection, recent umbilical hernia surgery on 3/6/24, was admitted postoperatively with abdominal wall cellulitis and fluid collection she underwent IR.  Aspiration on previous admission fluid cultures negative she was discharged home on oral clindamycin secondary to multiple antibiotic allergies she also has a  shunt in place with a history of chronic smoking morbid obesity BMI 37.  She is now admitted to hospital secondary local pain abdominal wall cellulitis, CT abdomen pelvis indicated 3.8 cm fluid collection in the midline subcutaneous soft tissue otherwise no intra-abdominal pathology noted we are consulted for antibiotic recommendations      Past Medical History:    Past Medical History:   Diagnosis Date    ADHD (attention deficit hyperactivity disorder) 1988    Depression with anxiety     Difficult intubation     airway swelled up    Drug-seeking behavior     Encounter for imaging to screen for metal prior to MRI 06/01/2021    MRI Conditional Medtronic Non-Programmable shunt model#[id number] implanted 10/30/2020 at [facility]. Normal Mode. 1.5T or 3.0T.    ESBL (extended spectrum beta-lactamase) producing bacteria infection 11/06/2019    urine    Functional ovarian cysts 2008    rt ovary cyst x 2 yrs.    GERD (gastroesophageal reflux disease) When I was a kid    Headache(784.0)     migraines    History of blood                       RECEIVED :  03/09/24 14:38   If child <=2 yrs old please draw pediatric bottle.~Blood Culture #2   Culture, Blood 1 [[id number]] Collected: 03/09/24 1431   Order Status: Completed Specimen: Blood Updated: 03/13/24 1715    Blood Culture, Routine No Growth after 4 days of incubation.   Narrative:         Blood Culture:   Lab Results   Component Value Date/Time    BC No Growth after 4 days of incubation. 03/09/2024 02:31 PM    BLOODCULT2 No Growth after 4 days of incubation. 03/09/2024 02:32 PM       Viral Culture:    Lab Results   Component Value Date/Time    COVID19 Not Detected 02/16/2024 03:05 PM     Urine Culture: No results for input(s): \"LABURIN\" in the last 72 hours.    Scheduled Meds:   sodium chloride flush  5-40 mL IntraVENous 2 times per day    enoxaparin  40 mg SubCUTAneous Daily    melatonin  3 mg Oral Nightly    clindamycin (CLEOCIN) IV  600 mg IntraVENous Q8H    scopolamine  1 patch TransDERmal Q72H       Continuous Infusions:   sodium chloride      lactated ringers IV soln 100 mL/hr at 03/17/24 0726       PRN Meds:  sodium chloride flush, sodium chloride, potassium chloride **OR** potassium alternative oral replacement **OR** potassium chloride, magnesium sulfate, ondansetron **OR** ondansetron, polyethylene glycol, acetaminophen **OR** acetaminophen, promethazine, HYDROmorphone, HYDROcodone 5 mg - acetaminophen **OR** HYDROcodone 5 mg - acetaminophen    Imaging:   CT ABDOMEN PELVIS W IV CONTRAST Additional Contrast? None   Preliminary Result   1. 3.8 x 3.1 x 4.9 cm fluid collection within the midline subcutaneous soft   tissues without gas.  This is slightly increased in size when compared to   03/07/2024.   2. No acute abdominal or pelvic abnormality is otherwise noted.   3. Nonobstructing left renal calculus.             All pertinent images and reports for the current Hospitalization were reviewed by me.    IMPRESSION:    Patient Active Problem List   Diagnosis Code

## 2024-03-17 NOTE — CONSULTS
GENERAL SURGERY  Consult Note    Patient Name: Petrona Green  MRN: [id number]  YOB: 1980   Date of Evaluation: 3/17/2024  Primary Care Physician: Linda Fox APRN    Referred by: ED provider    Chief Complaint   Patient presents with    Cellulitis     States was recently admitted for cellulitis following hernia repair, has not been able to keep abx down at home and has increased redness on abdomen.          SUBJECTIVE:     Petrona is a 43 y.o. year-old female.  She underwent repair of recurrent umbilical hernia on 3/6 (Dr. Marshall).  She was subsequently readmitted for concern for infected seroma versus cellulitis.  The seroma was aspirated and has been sterile with no bacterial growth to date.  Her overlying skin changes were treated with empiric antibiotics given her presence of a  shunt.  She was subsequently discharged home.  She phoned the office yesterday 3/16 with complaints of fever, nausea, vomiting, worsening abdominal pain and redness.  She then proceeded to the emergency department for evaluation.    REVIEW OF SYSTEMS  A comprehensive review of systems was completed and is negative except for any elements noted above.    Past Medical History:   Diagnosis Date    ADHD (attention deficit hyperactivity disorder) 1988    Depression with anxiety     Difficult intubation     airway swelled up    Drug-seeking behavior     Encounter for imaging to screen for metal prior to MRI 06/01/2021    MRI Conditional Medtronic Non-Programmable shunt model#63773 implanted 10/30/2020 at [facility]. Normal Mode. 1.5T or 3.0T.    ESBL (extended spectrum beta-lactamase) producing bacteria infection 11/06/2019    urine    Functional ovarian cysts 2008    rt ovary cyst x 2 yrs.    GERD (gastroesophageal reflux disease) When I was a kid    Headache(784.0)     migraines    History of blood transfusion     at birth    History of kidney stones     History of PCOS     had hysterectomy in 2016     Depression Sister     Broken Bones Sister         2005    Scoliosis Sister         My other sister had spinal fusion surgery    Scoliosis Sister     Diabetes Maternal Grandmother     High Blood Pressure Maternal Grandmother     High Cholesterol Maternal Grandmother     Heart Disease Maternal Grandfather     High Blood Pressure Maternal Grandfather     Heart Disease Paternal Grandmother     Stroke Paternal Grandfather     Rheum Arthritis Neg Hx     Osteoarthritis Neg Hx     Asthma Neg Hx     Breast Cancer Neg Hx     Cancer Neg Hx     Hypertension Neg Hx     Migraines Neg Hx     Ovarian Cancer Neg Hx     Rashes/Skin Problems Neg Hx     Seizures Neg Hx     Thyroid Disease Neg Hx          OBJECTIVE:     /73   Pulse 72   Temp 98.3 °F (36.8 °C) (Oral)   Resp 16   Ht 1.499 m (4' 11\")   Wt 83.8 kg (184 lb 11.2 oz)   LMP 11/13/2016   SpO2 96%   BMI 37.30 kg/m²     PHYSICAL EXAM  General/appearance: Awake and alert, in no acute distress  Lungs: Respirations unlabored  Breast: Deferred  Cardiac: Regular rate  Abdomen: soft, non-distended, appropriately tender to palpation at surgical sites, erythema around umbilical incision, approx 10 cm, no drainage or bleeding, no skin breakdown  Hernia: None  Rectal: Deferred  Incision: clean, dry, intact, dressing in place  Extremities: Warm and well perfused, no edema  Neuro: No focal findings  Skin: as above      LABS:     Recent Labs     03/16/24 2050 03/16/24  2108 03/16/24  2302   WBC 14.4*  --   --    HGB 14.8  --   --    HCT 43.8  --   --      --   --      --   --    K 3.7  --   --      --   --    CO2 22  --   --    BUN 13  --   --    CREATININE 0.6  --   --    CALCIUM 9.8  --   --    LACTSEPSIS  --  2.1* 1.7     No results for input(s): \"ALKPHOS\", \"ALT\", \"AST\", \"BILITOT\", \"BILIDIR\", \"LABALBU\", \"AMYLASE\", \"LIPASE\" in the last 72 hours.      IMAGING:     CT ABDOMEN PELVIS W IV CONTRAST Additional Contrast? None    Result Date: 3/16/2024  1. 3.8 x

## 2024-03-17 NOTE — PROGRESS NOTES
D: Dr. Roblero ordered scopolamine patch for pt. Also, pharmacy called and said that phenergan iv is available. A: this RN sent secure message to Dr. Roblero concerning this and also let physician know that pt is wanting to leave a.m.a, but is willing to stay if prn medications can be increased. R: Dr. Roblero ordered to give phenergan iv piggyback and to let physician know if pt wants to leave a.m.a. only norco available for pain management at this time, but nausea medications have been added. Will continue to monitor pt.

## 2024-03-17 NOTE — PROGRESS NOTES
Hospitalist   Progress Note    Patient Name: Petrona Green  PCP: Linda Fox APRN  Date of Admission: 3/16/2024    Chief Complaint on Admission: Increased periumbilical redness  Chief diagnosis after evaluation: Cellulitis and abscess of the abdominal wall       Brief Synopsis: Patient is a 43 y.o. woman who has a past medical history of ADHD (attention deficit hyperactivity disorder), Depression with anxiety, Difficult intubation, Drug-seeking behavior, Encounter for imaging to screen for metal prior to MRI, ESBL (extended spectrum beta-lactamase) producing bacteria infection, Functional ovarian cysts, GERD (gastroesophageal reflux disease), Headache(784.0), History of blood transfusion, History of kidney stones, History of PCOS, Hydrocephalus (HCC), Hyperlipidemia, Irritable bowel syndrome, Meningitis, MRSA (methicillin resistant Staphylococcus aureus), Neutrophilic leukocytosis, Nicotine dependence, Piercing, PONV (postoperative nausea and vomiting), Prediabetes, Primary osteoarthritis of left knee, S/P cone biopsy of cervix, Scoliosis, Seizures (HCC),  (ventriculoperitoneal) shunt status, and Wears glasses. who was admitted on 3/16/2024 for evaluation and treatment of cellulitis and abscess of the abdominal wall    Pt Seen/Examined and Chart Reviewed.     Subjective: Pt reports continued periumbilical pain and erythema    Objective:  Allergies  Bee venom, Bentyl [dicyclomine hcl], Dicyclomine, Ketorolac tromethamine, Levofloxacin, Maitake, Shiitake mushroom, Sulfa antibiotics, Zosyn [piperacillin sod-tazobactam so], Adhesive tape, Sulfacetamide, Acetaminophen, Butalbital-apap-caff-cod, Ceftaroline, Daptomycin, Haldol [haloperidol], Keppra [levetiracetam], Ketamine, Methocarbamol, Prochlorperazine, Reglan [metoclopramide], and Silicone    Medications    Scheduled Meds:   sodium chloride flush  5-40 mL IntraVENous 2 times per day    enoxaparin  40 mg SubCUTAneous Daily    melatonin  3 mg Oral Nightly     clindamycin (CLEOCIN) IV  600 mg IntraVENous Q8H     Infusions:   sodium chloride      lactated ringers IV soln 100 mL/hr at 03/17/24 0726     PRN Meds:  sodium chloride flush, sodium chloride, potassium chloride **OR** potassium alternative oral replacement **OR** potassium chloride, magnesium sulfate, ondansetron **OR** ondansetron, polyethylene glycol, acetaminophen **OR** acetaminophen, promethazine, HYDROcodone 5 mg - acetaminophen **OR** HYDROcodone 5 mg - acetaminophen    Physical    VITALS:  /73   Pulse 72   Temp 98.3 °F (36.8 °C) (Oral)   Resp 16   Ht 1.499 m (4' 11\")   Wt 83.8 kg (184 lb 11.2 oz)   LMP 11/13/2016   SpO2 96%   BMI 37.30 kg/m²   CONSTITUTIONAL:  WD/WN 43 y.o. year-old female who is awake, alert, cooperative, no apparent distress, and appears stated age  EYES:  Lids and lashes normal, PERRL, EOMI, sclera clear, conjunctiva normal  ENT:  NC/AT, MMM    NECK:  Supple, symmetrical, trachea midline, no adenopathy  HEMATOLOGIC/LYMPHATICS:  no cervical, supraclavicular or axillary lymphadenopathy  LUNGS:  clear to auscultation bilaterally, No increased work of breathing, good air exchange, no crackles or wheezing  CARDIOVASCULAR:  Regular rate and rhythm, normal S1 and S2, no S3 or S4, and no significant murmurs, rubs or gallops noted. Normal apical impulse.   ABDOMEN:  Normal active bowel sounds, soft, non-tender, non-distended, no masses palpated, no organomegally  EXTREMITIES.  extremities atraumatic, no cyanosis or edema and Homans sign is negative, no sign of DVT.   MENTAL STATUS: Awake, alert, oriented to name, place and time.    NEUROLOGIC:  Cranial nerves II-XII are grossly intact.        Data    CBC with Differential:    Lab Results   Component Value Date/Time    WBC 14.4 03/16/2024 08:50 PM    HGB 14.8 03/16/2024 08:50 PM    HCT 43.8 03/16/2024 08:50 PM     03/16/2024 08:50 PM    MCV 85.5 03/16/2024 08:50 PM    RDW 14.7 03/16/2024 08:50 PM    SEGSPCT 59 06/30/2016

## 2024-03-18 VITALS
HEART RATE: 66 BPM | TEMPERATURE: 98.7 F | HEIGHT: 59 IN | DIASTOLIC BLOOD PRESSURE: 74 MMHG | WEIGHT: 187.83 LBS | RESPIRATION RATE: 16 BRPM | BODY MASS INDEX: 37.87 KG/M2 | OXYGEN SATURATION: 97 % | SYSTOLIC BLOOD PRESSURE: 121 MMHG

## 2024-03-18 PROBLEM — E87.20 LACTIC ACID ACIDOSIS: Status: ACTIVE | Noted: 2024-03-18

## 2024-03-18 PROBLEM — F17.200 SMOKING: Status: ACTIVE | Noted: 2024-03-18

## 2024-03-18 PROBLEM — R18.8 ABDOMINAL WALL FLUID COLLECTIONS: Status: ACTIVE | Noted: 2024-03-18

## 2024-03-18 LAB
ANION GAP SERPL CALCULATED.3IONS-SCNC: 10 MMOL/L (ref 3–16)
BASOPHILS # BLD: 0.1 K/UL (ref 0–0.2)
BASOPHILS NFR BLD: 1 %
BUN SERPL-MCNC: 16 MG/DL (ref 7–20)
CALCIUM SERPL-MCNC: 9 MG/DL (ref 8.3–10.6)
CHLORIDE SERPL-SCNC: 104 MMOL/L (ref 99–110)
CO2 SERPL-SCNC: 23 MMOL/L (ref 21–32)
CREAT SERPL-MCNC: 0.7 MG/DL (ref 0.6–1.1)
CRP SERPL-MCNC: 3.4 MG/L (ref 0–5.1)
DEPRECATED RDW RBC AUTO: 14.3 % (ref 12.4–15.4)
EOSINOPHIL # BLD: 0.3 K/UL (ref 0–0.6)
EOSINOPHIL NFR BLD: 3.4 %
ERYTHROCYTE [SEDIMENTATION RATE] IN BLOOD BY WESTERGREN METHOD: 10 MM/HR (ref 0–20)
GFR SERPLBLD CREATININE-BSD FMLA CKD-EPI: >60 ML/MIN/{1.73_M2}
GLUCOSE SERPL-MCNC: 154 MG/DL (ref 70–99)
HCT VFR BLD AUTO: 35.1 % (ref 36–48)
HGB BLD-MCNC: 12.1 G/DL (ref 12–16)
LYMPHOCYTES # BLD: 2.5 K/UL (ref 1–5.1)
LYMPHOCYTES NFR BLD: 29.2 %
MCH RBC QN AUTO: 29.5 PG (ref 26–34)
MCHC RBC AUTO-ENTMCNC: 34.6 G/DL (ref 31–36)
MCV RBC AUTO: 85.4 FL (ref 80–100)
MONOCYTES # BLD: 0.7 K/UL (ref 0–1.3)
MONOCYTES NFR BLD: 8.6 %
NEUTROPHILS # BLD: 4.9 K/UL (ref 1.7–7.7)
NEUTROPHILS NFR BLD: 57.8 %
PLATELET # BLD AUTO: 246 K/UL (ref 135–450)
PMV BLD AUTO: 8.3 FL (ref 5–10.5)
POTASSIUM SERPL-SCNC: 3.7 MMOL/L (ref 3.5–5.1)
RBC # BLD AUTO: 4.11 M/UL (ref 4–5.2)
SODIUM SERPL-SCNC: 137 MMOL/L (ref 136–145)
WBC # BLD AUTO: 8.5 K/UL (ref 4–11)

## 2024-03-18 PROCEDURE — 80048 BASIC METABOLIC PNL TOTAL CA: CPT

## 2024-03-18 PROCEDURE — 6360000002 HC RX W HCPCS: Performed by: STUDENT IN AN ORGANIZED HEALTH CARE EDUCATION/TRAINING PROGRAM

## 2024-03-18 PROCEDURE — 6360000002 HC RX W HCPCS: Performed by: INTERNAL MEDICINE

## 2024-03-18 PROCEDURE — 85025 COMPLETE CBC W/AUTO DIFF WBC: CPT

## 2024-03-18 PROCEDURE — 85652 RBC SED RATE AUTOMATED: CPT

## 2024-03-18 PROCEDURE — 86140 C-REACTIVE PROTEIN: CPT

## 2024-03-18 PROCEDURE — 94760 N-INVAS EAR/PLS OXIMETRY 1: CPT

## 2024-03-18 PROCEDURE — 6360000002 HC RX W HCPCS: Performed by: HOSPITALIST

## 2024-03-18 PROCEDURE — 36415 COLL VENOUS BLD VENIPUNCTURE: CPT

## 2024-03-18 RX ORDER — TRAMADOL HYDROCHLORIDE 50 MG/1
50 TABLET ORAL EVERY 4 HOURS PRN
Qty: 42 TABLET | Refills: 0 | Status: SHIPPED | OUTPATIENT
Start: 2024-03-18 | End: 2024-03-25

## 2024-03-18 RX ORDER — DIPHENHYDRAMINE HYDROCHLORIDE 50 MG/ML
25 INJECTION INTRAMUSCULAR; INTRAVENOUS EVERY 6 HOURS PRN
Status: DISCONTINUED | OUTPATIENT
Start: 2024-03-18 | End: 2024-03-18 | Stop reason: HOSPADM

## 2024-03-18 RX ADMIN — CLINDAMYCIN PHOSPHATE 600 MG: 600 INJECTION, SOLUTION INTRAVENOUS at 04:30

## 2024-03-18 RX ADMIN — ENOXAPARIN SODIUM 40 MG: 100 INJECTION SUBCUTANEOUS at 08:54

## 2024-03-18 RX ADMIN — HYDROMORPHONE HYDROCHLORIDE 0.25 MG: 1 INJECTION, SOLUTION INTRAMUSCULAR; INTRAVENOUS; SUBCUTANEOUS at 04:30

## 2024-03-18 RX ADMIN — HYDROMORPHONE HYDROCHLORIDE 0.25 MG: 1 INJECTION, SOLUTION INTRAMUSCULAR; INTRAVENOUS; SUBCUTANEOUS at 12:21

## 2024-03-18 RX ADMIN — CLINDAMYCIN PHOSPHATE 600 MG: 600 INJECTION, SOLUTION INTRAVENOUS at 11:47

## 2024-03-18 RX ADMIN — ONDANSETRON 4 MG: 2 INJECTION INTRAMUSCULAR; INTRAVENOUS at 09:26

## 2024-03-18 RX ADMIN — HYDROMORPHONE HYDROCHLORIDE 0.25 MG: 1 INJECTION, SOLUTION INTRAMUSCULAR; INTRAVENOUS; SUBCUTANEOUS at 00:39

## 2024-03-18 RX ADMIN — HYDROMORPHONE HYDROCHLORIDE 0.25 MG: 1 INJECTION, SOLUTION INTRAMUSCULAR; INTRAVENOUS; SUBCUTANEOUS at 08:55

## 2024-03-18 RX ADMIN — DIPHENHYDRAMINE HYDROCHLORIDE 25 MG: 50 INJECTION INTRAMUSCULAR; INTRAVENOUS at 11:44

## 2024-03-18 ASSESSMENT — PAIN SCALES - GENERAL
PAINLEVEL_OUTOF10: 8
PAINLEVEL_OUTOF10: 8
PAINLEVEL_OUTOF10: 9
PAINLEVEL_OUTOF10: 8

## 2024-03-18 ASSESSMENT — PAIN DESCRIPTION - LOCATION
LOCATION: ABDOMEN

## 2024-03-18 ASSESSMENT — PAIN DESCRIPTION - ORIENTATION
ORIENTATION: RIGHT
ORIENTATION: RIGHT

## 2024-03-18 ASSESSMENT — PAIN DESCRIPTION - DESCRIPTORS
DESCRIPTORS: STABBING;SHARP
DESCRIPTORS: SHARP

## 2024-03-18 NOTE — PROGRESS NOTES
Patient discharge per Md order, IV removed without complication catheter intact. Patient refused wheelchair walked to lobby with all belonging family will be transporting home.

## 2024-03-18 NOTE — DISCHARGE INSTR - DIET
Good nutrition is important when healing from an illness, injury, or surgery.  Follow any nutrition recommendations given to you during your hospital stay.   If you were given an oral nutrition supplement while in the hospital, continue to take this supplement at home.  You can take it with meals, in-between meals, and/or before bedtime. These supplements can be purchased at most local grocery stores, pharmacies, and chain [employer]-stores.   If you have any questions about your diet or nutrition, call the hospital and ask for the dietitian.  Regular Diet    Refill request received from patient for oxycodone-acetaminophen.    LOV 8/11/2021  FOV 2/14/2022  Last Fill  7/23/2021 #120 pills 0 refills  End Date 8/22/2021    Script is loaded and is awaiting MD approval.

## 2024-03-18 NOTE — CARE COORDINATION
03/18/24 1108   Readmission Assessment   Number of Days since last admission? 8-30 days   Previous Disposition Home with Family   Who is being Interviewed Patient   What was the patient's/caregiver's perception as to why they think they needed to return back to the hospital? Other (Comment)  (N/V)   Did you visit your Primary Care Physician after you left the hospital, before you returned this time? Yes   Did you see a specialist, such as Cardiac, Pulmonary, Orthopedic Physician, etc. after you left the hospital? Yes   Who advised the patient to return to the hospital? Self-referral   Does the patient report anything that got in the way of taking their medications? Yes   What reasons did they give? Other (Comment)  (N/V)   In our efforts to provide the best possible care to you and others like you, can you think of anything that we could have done to help you after you left the hospital the first time, so that you might not have needed to return so soon? Other (Comment)  (\"no\")

## 2024-03-18 NOTE — PLAN OF CARE
Problem: Discharge Planning  Goal: Discharge to home or other facility with appropriate resources  3/18/2024 1018 by Courtney Rodriguez RN  Outcome: Progressing     Problem: Pain  Goal: Verbalizes/displays adequate comfort level or baseline comfort level  3/18/2024 1018 by Courtney Rodriguez RN  Outcome: Progressing     Problem: Safety - Adult  Goal: Free from fall injury  3/18/2024 1018 by Courtney Rodriguez RN  Outcome: Progressing     Problem: ABCDS Injury Assessment  Goal: Absence of physical injury  3/18/2024 1018 by Courtney Rodriguez RN  Outcome: Progressing     Problem: Skin/Tissue Integrity - Adult  Goal: Skin integrity remains intact  3/18/2024 1018 by Courtney Rodriguez RN  Outcome: Progressing     Problem: Skin/Tissue Integrity - Adult  Goal: Incisions, wounds, or drain sites healing without S/S of infection  3/18/2024 1018 by Courtney Rodriguez RN  Outcome: Progressing     Problem: Gastrointestinal - Adult  Goal: Minimal or absence of nausea and vomiting  3/18/2024 1018 by Courtney Rodriguez RN  Outcome: Progressing     Problem: Gastrointestinal - Adult  Goal: Maintains or returns to baseline bowel function  3/18/2024 1018 by Courtney Rodriguez RN  Outcome: Progressing     Problem: Gastrointestinal - Adult  Goal: Maintains adequate nutritional intake  3/18/2024 1018 by Courtney Rodriguez RN  Outcome: Progressing     Problem: Infection - Adult  Goal: Absence of infection at discharge  3/18/2024 1018 by Courtney Rodriguez RN  Outcome: Progressing     Problem: Infection - Adult  Goal: Absence of infection during hospitalization  3/18/2024 1018 by Courtney Rodriguez RN  Outcome: Progressing

## 2024-03-18 NOTE — CARE COORDINATION
DISCHARGE SUMMARY     DATE OF DISCHARGE: 3/18/24    DISCHARGE DESTINATION: Home with Mother    HOME CARE: No    HEMODIALYSIS: No    TRANSPORTATION: Private Car    NEW DME ORDERED: no    COMMENTS: Discharge to home with family. Electronically signed by Valentina Munoz RN on 3/18/2024 at 11:58 AM

## 2024-03-18 NOTE — PROGRESS NOTES
Pt alert and oriented x4. Pt c/o ABD pain 8/10. PRN medication administered. See MAR. Pt tolerated night medication. Pt verbalized understanding of education. VSS. Pt up as tolerated. Needed items within reach. Call light within reach.

## 2024-03-18 NOTE — CARE COORDINATION
Case Management Assessment  Initial Evaluation    Date/Time of Evaluation: 3/18/2024 11:16 AM  Assessment Completed by: Valentina Munoz RN    If patient is discharged prior to next notation, then this note serves as note for discharge by case management.    Patient Name: Petrona Green                     YOB: 1980  Diagnosis: Septicemia (HCC) [A41.9]  Intra-abdominal fluid collection [R18.8]  Abdominal wall cellulitis [L03.311]                     Date / Time: 3/16/2024  6:44 PM    Patient Admission Status: Inpatient   Readmission Risk (Low < 19, Mod (19-27), High > 27): Readmission Risk Score: 16.6    Current PCP: Linda Fox APRN  PCP verified by CM? (P) Yes    Chart Reviewed: Yes      History Provided by: Patient  Patient Orientation: Alert and Oriented    Patient Cognition: Alert    Hospitalization in the last 30 days (Readmission):  Yes    If yes, Readmission Assessment in CM Navigator will be completed.    Advance Directives:      Code Status: Full Code   Patient's Primary Decision Maker is: (P) Legal Next of Kin    Primary Decision Maker: Satya Gomez (Poa) - Parent - [phone]    Discharge Planning:    Patient lives with: (P) Family Members Type of Home: (P) House  Primary Care Giver: Self  Patient Support Systems include: Parent   Current Financial resources: (P) Medicaid  Current community resources: (P) Transportation  Current services prior to admission: (P) None            Current DME:              Type of Home Care services:  (P) None    ADLS  Prior functional level: (P) Independent in ADLs/IADLs  Current functional level: (P) Independent in ADLs/IADLs    PT AM-PAC:   /24  OT AM-PAC:   /24    Family can provide assistance at DC: (P) Yes  Would you like Case Management to discuss the discharge plan with any other family members/significant others, and if so, who? (P) No  Plans to Return to Present Housing: (P) Yes  Other Identified Issues/Barriers to RETURNING to current housing:  Functional Level Independent in ADLs/IADLs   Current Functional Level Independent in ADLs/IADLs   Can patient return to prior living arrangement Yes   Ability to make needs known: Good   Family able to assist with home care needs: Yes   Would you like for me to discuss the discharge plan with any other family members/significant others, and if so, who? No   Financial Resources Medicaid   Community Resources Transportation   CM/SW Referral Other (see comment)  (re-admit)   Discharge Planning   Type of Residence House   Living Arrangements Family Members   Current Services Prior To Admission None   Potential Assistance Needed N/A   DME Ordered? No   Potential Assistance Purchasing Medications No   Type of Home Care Services None   Patient expects to be discharged to: House   Follow Up Appointment: Best Day/Time  Monday AM   One/Two Story Residence One story   History of falls? 0   Services At/After Discharge   Transition of Care Consult (CM Consult) N/A   Services At/After Discharge Transport   [place] Resource Information Provided? No   Mode of Transport at Discharge Other (see comment)  (caresource)   Confirm Follow Up Transport Other (see comment)  (caremeli)     Electronically signed by Valentina Munoz RN on 3/18/2024 at 11:19 AM

## 2024-03-18 NOTE — PROGRESS NOTES
Pt denies nausea at this time and able to eat grilled cheese. Pt said that now that nausea medication has kicked in, she is hungry.

## 2024-03-18 NOTE — PLAN OF CARE
Problem: Discharge Planning  Goal: Discharge to home or other facility with appropriate resources  3/18/2024 0456 by Patrick Traore RN  Outcome: Progressing     Problem: Pain  Goal: Verbalizes/displays adequate comfort level or baseline comfort level  3/18/2024 0456 by Patrick Traore RN  Outcome: Progressing     Problem: Safety - Adult  Goal: Free from fall injury  3/18/2024 0456 by Patrick Traore RN  Outcome: Progressing     Problem: ABCDS Injury Assessment  Goal: Absence of physical injury  3/18/2024 0456 by Patrick Traore RN  Outcome: Progressing     Problem: Skin/Tissue Integrity - Adult  Goal: Skin integrity remains intact  3/18/2024 0456 by Patrick Traore RN  Outcome: Progressing  Flowsheets (Taken 3/17/2024 1637 by Traci Tyler RN)  Skin Integrity Remains Intact: Monitor for areas of redness and/or skin breakdown     Problem: Skin/Tissue Integrity - Adult  Goal: Incisions, wounds, or drain sites healing without S/S of infection  3/18/2024 0456 by Patrick Traore RN  Outcome: Progressing     Problem: Gastrointestinal - Adult  Goal: Minimal or absence of nausea and vomiting  3/18/2024 0456 by Patrick Traore RN  Outcome: Progressing     Problem: Gastrointestinal - Adult  Goal: Maintains or returns to baseline bowel function  3/18/2024 0456 by Patrick Traore RN  Outcome: Progressing     Problem: Gastrointestinal - Adult  Goal: Maintains adequate nutritional intake  3/18/2024 0456 by Patrick Traore RN  Outcome: Progressing     Problem: Infection - Adult  Goal: Absence of infection at discharge  3/18/2024 0456 by Patrick Traore RN  Outcome: Progressing     Problem: Infection - Adult  Goal: Absence of infection during hospitalization  3/18/2024 0456 by Patrick Traore RN  Outcome: Progressing

## 2024-03-18 NOTE — DISCHARGE SUMMARY
iterative reconstruction, and/or weight based adjustment of the mA/kV was utilized to reduce the radiation dose to as low as reasonably achievable. COMPARISON: 03/07/2024, 02/16/2024 HISTORY: ORDERING SYSTEM PROVIDED HISTORY: s/p hernia repair w/ evidence currently of abdominal cellulitis TECHNOLOGIST PROVIDED HISTORY: Additional Contrast?->None Reason for exam:->s/p hernia repair w/ evidence currently of abdominal cellulitis Decision Support Exception - unselect if not a suspected or confirmed emergency medical condition->Emergency Medical Condition (MA) Reason for Exam: s/p hernia repair w/ evidence currently of abdominal cellulitis FINDINGS: Lower Chest: Lung bases are clear. Organs: The liver, pancreas, spleen and adrenal glands are without focal abnormality.  Status post cholecystectomy.  The kidneys enhance symmetrically.  There is a 3 mm nonobstructing right renal calculus.  No hydronephrosis or perinephric stranding. GI/Bowel: No dilated loops of bowel or bowel wall thickening.  No free air. Scattered colonic diverticula without acute diverticulitis.  The appendix is not visualized. Pelvis: No bladder wall thickening.  Status post hysterectomy.  No pathologically enlarged adenopathy or significant free fluid. Ventriculoperitoneal shunt is noted in the left lower quadrant. Peritoneum/Retroperitoneum: The aorta is normal caliber.  The celiac axis, SMA and SABRINA are patent.  The portal venous system is patent.  No pathologically enlarged adenopathy.  No ascites or drainable fluid collection. Bones/Soft Tissues: Again noted is a midline fluid collection measuring 3.8 x 3.1 x 4.9 cm slightly increased in size from 03/07/2024 with resolution of internal gas.  No acute osseous abnormality.     1. 3.8 x 3.1 x 4.9 cm fluid collection within the midline subcutaneous soft tissues without gas.  This is slightly increased in size when compared to 03/07/2024. 2. No acute abdominal or pelvic abnormality is otherwise noted.  sent for culture.  Catheter was removed. The patient tolerated the procedure well and there were no immediate complications. Estimated blood loss: < 10 mL Dose modulation, iterative reconstruction, and/or weight based adjustment of the mA/kV was utilized to reduce the radiation dose to as low as reasonably achievable. Total exam DLP: 398.2 mGy-cm     Successful aspiration of the periumbilical fluid collection  using CT guidance.     CT ABDOMEN PELVIS W IV CONTRAST Additional Contrast? None    Result Date: 3/7/2024  EXAMINATION: CT OF THE ABDOMEN AND PELVIS WITH CONTRAST 3/7/2024 2:46 pm TECHNIQUE: CT of the abdomen and pelvis was performed with the administration of intravenous contrast. Multiplanar reformatted images are provided for review. Automated exposure control, iterative reconstruction, and/or weight based adjustment of the mA/kV was utilized to reduce the radiation dose to as low as reasonably achievable. COMPARISON: None. HISTORY: ORDERING SYSTEM PROVIDED HISTORY: fever, abdominal distention, abdominal pain, emesis s/p hernia surgery TECHNOLOGIST PROVIDED HISTORY: Reason for exam:->fever, abdominal distention, abdominal pain, emesis s/p hernia surgery Additional Contrast?->None Decision Support Exception - unselect if not a suspected or confirmed emergency medical condition->Emergency Medical Condition (MA) Reason for Exam: fever, abdominal distention, abdominal pain, emesis s/p hernia surgery Relevant Medical/Surgical History: hernia repair yesterday, thinks incision is infected - abdomen surgery, hysterectomy, , cholecystectomy, hemmorhoid surgery, hernia repair x2, lithotripsy FINDINGS: Lower Chest: No infiltrate or effusion. Liver: There is normal hepatic parenchymal enhancement. No focal hepatic mass. Portal and hepatic veins are patent. No intrahepatic biliary ductal dilation.. Biliary: Status post cholecystectomy. Spleen: Normal in size. Pancreas: No pancreatic ductal dilation. No discrete

## 2024-03-19 NOTE — PATIENT INSTRUCTIONS
Start augmentin, let me know if you have any issues. Take with phenergan.    See GI at [facility]  Stool sample Yes Yes Yes

## 2024-03-20 LAB — BACTERIA BLD CULT ORG #2: NORMAL

## 2024-03-20 NOTE — DISCHARGE SUMMARY
General Surgery Discharge Summary        Petrona Green   : 1980 MRN: [id number]  Date of Admission: 3/7/2024  Admitting Physician:Bo Marshall MD  Primary Care Physician: Linda Fox APRN  Summary by: Dallin Ervin PA-C    Diagnosis Present on Admission:   Post-operative nausea and vomiting   Postoperative pain   Postoperative fever   Anxiety   Urinary retention   History of creation of ventriculoperitoneal shunt   Umbilical hernia without obstruction and without gangrene   Morbid obesity due to excess calories ([facility])   Neutrophilia   Postoperative nausea and vomiting      Secondary diagnosis:   Patient Active Problem List   Diagnosis    Attention deficit hyperactivity disorder (ADHD)     (ventriculoperitoneal) shunt status    Scoliosis    Prediabetes    Tobacco smoker    Anxiety    Onychomycosis    Rectal bleeding    Congenital hydrocephalus (HCC)    Sepsis ([facility])    Ureteritis    Acute cystitis without hematuria    Urinary retention    Lactic acidosis    Leukocytosis    History of brain shunt    Cellulitis of abdominal wall    Mood disorder ([facility])    Numbness and tingling in left hand    Diverticulosis    Colitis    Infection due to extended-spectrum beta-lactamase-producing Escherichia coli    Kidney stone    Intermittent small bowel obstruction due to adhesions ([facility])    Abdominal wall mass    Cyst and pseudocyst of pancreas    Abdominal pain in female    Nausea and vomiting    COVID    Abdominal wall cellulitis    DM2 (diabetes mellitus, type 2) ([facility])    HTN (hypertension)    Cellulitis    History of extended-spectrum beta-lactamase producing Escherichia coli infection    History of creation of ventriculoperitoneal shunt    Allergy to multiple antibiotics    Seizure-like activity ([facility])    Multiple drug resistant organism ([facility]) culture positive    Renal stones    Diverticulitis    Abdominal pain    Mixed hyperlipidemia    Seizure disorder (HCC)    Abnormal EKG    Atypical chest

## 2024-03-21 ENCOUNTER — PATIENT MESSAGE (OUTPATIENT)
Dept: INTERNAL MEDICINE CLINIC | Age: 44
End: 2024-03-21

## 2024-03-21 ENCOUNTER — TELEMEDICINE (OUTPATIENT)
Dept: INTERNAL MEDICINE CLINIC | Age: 44
End: 2024-03-21
Payer: COMMERCIAL

## 2024-03-21 DIAGNOSIS — L03.311 ABDOMINAL WALL CELLULITIS: Primary | ICD-10-CM

## 2024-03-21 DIAGNOSIS — R11.2 NAUSEA AND VOMITING, UNSPECIFIED VOMITING TYPE: ICD-10-CM

## 2024-03-21 DIAGNOSIS — R18.8 INTRA-ABDOMINAL FLUID COLLECTION: ICD-10-CM

## 2024-03-21 DIAGNOSIS — E66.09 CLASS 2 OBESITY DUE TO EXCESS CALORIES WITH BODY MASS INDEX (BMI) OF 37.0 TO 37.9 IN ADULT, UNSPECIFIED WHETHER SERIOUS COMORBIDITY PRESENT: ICD-10-CM

## 2024-03-21 LAB — BACTERIA BLD CULT: NORMAL

## 2024-03-21 PROCEDURE — 1111F DSCHRG MED/CURRENT MED MERGE: CPT | Performed by: NURSE PRACTITIONER

## 2024-03-21 PROCEDURE — 99213 OFFICE O/P EST LOW 20 MIN: CPT | Performed by: NURSE PRACTITIONER

## 2024-03-21 PROCEDURE — G8427 DOCREV CUR MEDS BY ELIG CLIN: HCPCS | Performed by: NURSE PRACTITIONER

## 2024-03-21 RX ORDER — PROMETHAZINE HYDROCHLORIDE 25 MG/1
25 TABLET ORAL EVERY 6 HOURS PRN
Qty: 30 TABLET | Refills: 0 | Status: SHIPPED | OUTPATIENT
Start: 2024-03-21

## 2024-03-21 ASSESSMENT — ENCOUNTER SYMPTOMS
DIARRHEA: 1
CONSTIPATION: 0
NAUSEA: 1

## 2024-03-21 NOTE — PROGRESS NOTES
Linda Fox APRN   lamoTRIgine (LAMICTAL) 25 MG tablet Take 4 tablets by mouth at bedtime Yes Provider, Aundrea, MD       Social History     Tobacco Use    Smoking status: Every Day     Current packs/day: 0.25     Average packs/day: 0.3 packs/day for 23.4 years (5.9 ttl pk-yrs)     Types: Cigarettes     Start date: 10/10/2000    Smokeless tobacco: Never   Vaping Use    Vaping Use: Never used   Substance Use Topics    Alcohol use: No    Drug use: Never        Allergies   Allergen Reactions    Bee Venom Shortness Of Breath and Swelling    Bentyl [Dicyclomine Hcl] Shortness Of Breath and Anxiety    Dicyclomine Anxiety and Shortness Of Breath    Ketorolac Tromethamine Hives and Itching       Tolerates PO NSAIDs fine    Levofloxacin Anxiety and Hives    Maitake Anaphylaxis    Shiitake Mushroom Anaphylaxis     Can not eat mushrooms    Sulfa Antibiotics Shortness Of Breath    Zosyn [Piperacillin Sod-Tazobactam So] Hives, Shortness Of Breath, Nausea Only and Dizziness or Vertigo    Adhesive Tape Dermatitis     Other reaction(s): Dermatitis    Sulfacetamide Hives    Acetaminophen Anxiety     Patient reports when taking acetaminophen (including Norco/Percocet) she gets severe anxiety when taking this medication.     Butalbital-Apap-Caff-Cod Anxiety    Ceftaroline Rash     Does tolerate cefepime and has had doses before, confirmed 10/9/2022 with pharmacy    Daptomycin Swelling and Rash    Haldol [Haloperidol] Anxiety    Keppra [Levetiracetam] Itching and Rash     Per patient itching and rash.     Ketamine Nausea And Vomiting and Anxiety    Methocarbamol Rash    Prochlorperazine Anxiety     Patient tells me she takes phenergan    Reglan [Metoclopramide] Anxiety    Silicone Hives, Swelling and Rash   ,   Past Medical History:   Diagnosis Date    ADHD (attention deficit hyperactivity disorder) 1988    Depression with anxiety     Difficult intubation     airway swelled up    Drug-seeking behavior     Encounter for

## 2024-03-22 ENCOUNTER — OFFICE VISIT (OUTPATIENT)
Dept: PSYCHOLOGY | Age: 44
End: 2024-03-22
Payer: COMMERCIAL

## 2024-03-22 DIAGNOSIS — Z86.59 HISTORY OF POSTTRAUMATIC STRESS DISORDER (PTSD): ICD-10-CM

## 2024-03-22 DIAGNOSIS — F41.1 GAD (GENERALIZED ANXIETY DISORDER): Primary | ICD-10-CM

## 2024-03-22 PROCEDURE — 4004F PT TOBACCO SCREEN RCVD TLK: CPT | Performed by: PSYCHOLOGIST

## 2024-03-22 PROCEDURE — 90791 PSYCH DIAGNOSTIC EVALUATION: CPT | Performed by: PSYCHOLOGIST

## 2024-03-22 NOTE — PROGRESS NOTES
Caffeine: has 2 bottles of Coke/day  Sleep:fair  Exercise: not currently  Drugs/Etoh: denies both  Tobacco: every day smoker and has smoked about 1/3 of a pack/day for 23 years  SI/HI: denies    Mental health history:     Social History     Tobacco Use    Smoking status: Every Day     Current packs/day: 0.25     Average packs/day: 0.3 packs/day for 23.5 years (5.9 ttl pk-yrs)     Types: Cigarettes     Start date: 10/10/2000    Smokeless tobacco: Never   Substance Use Topics    Alcohol use: No        Illicit drugs:   Social History     Substance and Sexual Activity   Drug Use Never        O:  MSE:  Appearance: good hygiene   Attitude: cooperative and friendly  Consciousness: alert  Orientation: oriented to person, place, time, general circumstance  Memory: recent and remote memory intact  Attention/Concentration: intact during session  Psychomotor Activity: normal  Eye Contact: normal  Speech: normal rate and volume, well-articulated  Mood: anxious  Affect: congruent  Perception: within normal limits  Thought Content: within normal limits  Thought Process: logical, coherent and goal-directed  Insight: good  Judgment: intact  Ability to understand instructions: Yes  Ability to respond meaningfully: Yes  Morbid Ideation: no   Suicide Assessment: no suicidal ideation, plan, or intent  Homicidal Ideation: no    A:  We will go through the differential diagnostic process looking at ADHD, anxiety, and trauma. We talked about how developing a deep breathing practice can help her start feeling like there's something she can do besides take medicine to help her anxiety. She sees a psych NP on 3/26 which will help with ddx.         3/26/2024    11:21 AM   ASHLEY 7 SCORE   ASHLEY-7 Total Score 7     Interpretation of ASHLEY-7 score: 5-9 = mild anxiety, 10-14 = moderate anxiety, 15+ = severe anxiety. Recommend referral to behavioral health for scores 10 or greater.        3/26/2024    11:20 AM 2/16/2024     8:45 AM 9/23/2023     1:25

## 2024-03-26 ENCOUNTER — OFFICE VISIT (OUTPATIENT)
Dept: PSYCHIATRY | Age: 44
End: 2024-03-26
Payer: COMMERCIAL

## 2024-03-26 VITALS — BODY MASS INDEX: 36.86 KG/M2 | WEIGHT: 182.5 LBS

## 2024-03-26 DIAGNOSIS — F41.1 GAD (GENERALIZED ANXIETY DISORDER): ICD-10-CM

## 2024-03-26 DIAGNOSIS — Z86.59 HISTORY OF ADHD: Primary | ICD-10-CM

## 2024-03-26 PROCEDURE — 99205 OFFICE O/P NEW HI 60 MIN: CPT | Performed by: REGISTERED NURSE

## 2024-03-26 PROCEDURE — 4004F PT TOBACCO SCREEN RCVD TLK: CPT | Performed by: REGISTERED NURSE

## 2024-03-26 PROCEDURE — 1111F DSCHRG MED/CURRENT MED MERGE: CPT | Performed by: REGISTERED NURSE

## 2024-03-26 PROCEDURE — G8417 CALC BMI ABV UP PARAM F/U: HCPCS | Performed by: REGISTERED NURSE

## 2024-03-26 PROCEDURE — G8428 CUR MEDS NOT DOCUMENT: HCPCS | Performed by: REGISTERED NURSE

## 2024-03-26 PROCEDURE — G8484 FLU IMMUNIZE NO ADMIN: HCPCS | Performed by: REGISTERED NURSE

## 2024-03-26 RX ORDER — LORAZEPAM 0.5 MG/1
0.5 TABLET ORAL 2 TIMES DAILY PRN
Qty: 60 TABLET | Refills: 0 | Status: SHIPPED | OUTPATIENT
Start: 2024-03-26 | End: 2024-04-25

## 2024-03-26 ASSESSMENT — PATIENT HEALTH QUESTIONNAIRE - PHQ9
SUM OF ALL RESPONSES TO PHQ QUESTIONS 1-9: 9
3. TROUBLE FALLING OR STAYING ASLEEP: NOT AT ALL
7. TROUBLE CONCENTRATING ON THINGS, SUCH AS READING THE NEWSPAPER OR WATCHING TELEVISION: NEARLY EVERY DAY
6. FEELING BAD ABOUT YOURSELF - OR THAT YOU ARE A FAILURE OR HAVE LET YOURSELF OR YOUR FAMILY DOWN: NOT AT ALL
5. POOR APPETITE OR OVEREATING: MORE THAN HALF THE DAYS
SUM OF ALL RESPONSES TO PHQ QUESTIONS 1-9: 9
9. THOUGHTS THAT YOU WOULD BE BETTER OFF DEAD, OR OF HURTING YOURSELF: NOT AT ALL
1. LITTLE INTEREST OR PLEASURE IN DOING THINGS: NOT AT ALL
SUM OF ALL RESPONSES TO PHQ QUESTIONS 1-9: 9
2. FEELING DOWN, DEPRESSED OR HOPELESS: NOT AT ALL
4. FEELING TIRED OR HAVING LITTLE ENERGY: NEARLY EVERY DAY
SUM OF ALL RESPONSES TO PHQ QUESTIONS 1-9: 9
SUM OF ALL RESPONSES TO PHQ9 QUESTIONS 1 & 2: 0
8. MOVING OR SPEAKING SO SLOWLY THAT OTHER PEOPLE COULD HAVE NOTICED. OR THE OPPOSITE, BEING SO FIGETY OR RESTLESS THAT YOU HAVE BEEN MOVING AROUND A LOT MORE THAN USUAL: SEVERAL DAYS

## 2024-03-26 ASSESSMENT — ANXIETY QUESTIONNAIRES
7. FEELING AFRAID AS IF SOMETHING AWFUL MIGHT HAPPEN: NOT AT ALL
1. FEELING NERVOUS, ANXIOUS, OR ON EDGE: NEARLY EVERY DAY
6. BECOMING EASILY ANNOYED OR IRRITABLE: NOT AT ALL
5. BEING SO RESTLESS THAT IT IS HARD TO SIT STILL: NEARLY EVERY DAY
3. WORRYING TOO MUCH ABOUT DIFFERENT THINGS: NOT AT ALL
GAD7 TOTAL SCORE: 7
4. TROUBLE RELAXING: SEVERAL DAYS
2. NOT BEING ABLE TO STOP OR CONTROL WORRYING: NOT AT ALL

## 2024-03-26 NOTE — PATIENT INSTRUCTIONS
Here are some of Psychiatric Emergency resources for you:     National suicide hotlines: 988, 5-831-080-TALK (1-525.154.3146) and 5-385-YTTCDFJ (1-838.442.3617).   2.  Call 911 or go to any nearest emergency room   3.   Access the [facility] Emergency Psychiatry Services:     - Go to the  Psychiatric Emergency Services (PES) at [address] [address]   - Call the  PES at [phone].    - Call the  Mobile Crisis Team at [phone]

## 2024-03-26 NOTE — PROGRESS NOTES
chronic small vessel  ischemic changes and remote postsurgical changes again noted.    SIGNED BY: Salbador Lee MD on 2/20/2023  8:19 PM    [facility] Imaging Report - [facility] Call Center [phone] -  [facility] Call Center: [phone]        Mental Status Examination  Appearance    alert, cooperative  Motor: Normal strength and tone, No abnormal movements, tics or mannerisms.  Speech    spontaneous  Mood/Affect    Anxious / anxiety  Thought Process    linear, goal directed, and coherent  Thought Content    intact , no suicidal ideation  Associations    logical connections  Attention/Concentration    impaired  Memory    recent and remote memory intact  Insight/Judgement    Fair / Intact    Safety plan  Discussed and educated patient to call [phone], or 911, or go to nearest emergency room if patient experiences SI/HI immediately.  In addition, Patient educated to use the National Suicide Hotlines: 4-508-370-TALK (1-832.602.4492) and 6-413-OYRFSIL (1-281.770.4128) and Local psychiatric Emergency Services given to patient during the office visit.      Total time spent on this encounter: 64 min     Thank you for consult. Please do not hesitate to contact provider if there are additional questions regarding patient.    Dayron Beckwith DNP, PMHNP-BC, CNP  3/26/2024

## 2024-03-27 NOTE — PROGRESS NOTES
Physician Progress Note      PATIENT:               CAROLINE MEJÍA  CSN #:                  [id number]  :                       1980  ADMIT DATE:       3/16/2024 6:44 PM  DISCH DATE:        3/18/2024 10:44 AM  RESPONDING  PROVIDER #:        Dallin JOLLY MD          QUERY TEXT:    Pt admitted with Cellulitis?and abscess?of?the?abdominal wall.  Noted   documentation of Seroma on 3/17/24 by ordered General surgery consultant and   Postop Seroma skin on 3/17/24 per ID consultant.  If possible, please document   in progress notes and discharge summary:    The medical record reflects the following:  Risk Factors: underwent repair of recurrent umbilical hernia on 3/6/24  Clinical Indicators: CT \" 3.8 x 3.1 x 4.9 cm fluid collection within the   midline subcutaneous soft tissues without gas\" General Surgery consult   \"imaging reviewed with recurrence of underlying seroma, but no evidence of   infection, no plans for repeat aspiration\"  per ID Consult \"based on the   examination I do not think this fluid is infected the suspect this is a postop   seroma skin changes over the abdominal wall indicate a contact dermatitis\"  Treatment: General surgery and ID consults  Options provided:  -- Postop Seroma skin confirmed present on admission and Cellulitis?and   abscess?of?the?abdominal wall ruled out after study  -- Defer to ID and General Surgery consultants' documentation regarding Postop   Seroma skin POA,  and Abdominal wall abscess and cellulitis ruled out after   study  -- Other - I will add my own diagnosis  -- Disagree - Not applicable / Not valid  -- Disagree - Clinically unable to determine / Unknown  -- Refer to Clinical Documentation Reviewer    PROVIDER RESPONSE TEXT:    I defer to ID and General Surgery consultants' consultant regarding   documentation of Postop Seroma skin POA, Abdominal wall abscess and cellulitis   ruled out after study .    Query created by: Cristy Rojas on 3/22/2024 6:56

## 2024-03-29 ENCOUNTER — CARE COORDINATION (OUTPATIENT)
Dept: CARE COORDINATION | Age: 44
End: 2024-03-29

## 2024-03-29 NOTE — CARE COORDINATION
Ambulatory Care Coordination Note  3/29/2024    Patient Current Location:  Home: [address] [place] [address] [place] [address]     ACM contacted the patient by telephone. Verified name and  with patient as identifiers. Provided introduction to self, and explanation of the ACM role.     Challenges to be reviewed by the provider   Additional needs identified to be addressed with provider: No  none               Method of communication with provider: chart routing.    ACM: Evonne Jeter, RN    Call to patient  Reports abdominal pain much improved  Saw psychiatrist and psychologist at PCP office  Would like to lose weight  Tearful about frustration of pain when walking  Contacted [employer] but does not feel she would do well in group meetings and only meeting virtually.  She feels she knows what to eat and is making changes in her diet, stopped sodas.  Provided encouragement for small changes     PLAN   Consider RD referral   Provided number for [facility] program as not sure if they have in person appts.?            Offered patient enrollment in the Remote Patient Monitoring (RPM) program for in-home monitoring: Patient declined.    Lab Results       None            Care Coordination Interventions    Referral from Primary Care Provider: No  Suggested Interventions and Community Resources          Goals Addressed    None         Future Appointments   Date Time Provider Department Center   2024 12:30 PM Beverly Fallon MD  NEURO Neurology -   2024 12:30 PM Preston Molina, PhD [place] PSYCHOLOG [facility]   2024 12:30 PM SCHEDULE, [facility]   2024 11:15 AM Anna Mcbride MD HEALTHY [facility]

## 2024-04-02 ENCOUNTER — TELEMEDICINE (OUTPATIENT)
Dept: INTERNAL MEDICINE CLINIC | Age: 44
End: 2024-04-02
Payer: COMMERCIAL

## 2024-04-02 DIAGNOSIS — R11.2 NAUSEA AND VOMITING, UNSPECIFIED VOMITING TYPE: Primary | ICD-10-CM

## 2024-04-02 PROCEDURE — 99213 OFFICE O/P EST LOW 20 MIN: CPT | Performed by: NURSE PRACTITIONER

## 2024-04-02 PROCEDURE — 1111F DSCHRG MED/CURRENT MED MERGE: CPT | Performed by: NURSE PRACTITIONER

## 2024-04-02 PROCEDURE — G8427 DOCREV CUR MEDS BY ELIG CLIN: HCPCS | Performed by: NURSE PRACTITIONER

## 2024-04-02 RX ORDER — PROMETHAZINE HYDROCHLORIDE 25 MG/1
25 TABLET ORAL EVERY 6 HOURS PRN
Qty: 30 TABLET | Refills: 0 | Status: SHIPPED | OUTPATIENT
Start: 2024-04-02

## 2024-04-02 ASSESSMENT — ENCOUNTER SYMPTOMS
DIARRHEA: 0
ABDOMINAL PAIN: 1
VOMITING: 1
NAUSEA: 1

## 2024-04-02 NOTE — PROGRESS NOTES
2024    TELEHEALTH EVALUATION -- Audio/Visual    HPI:    Petrona Green (:  1980) has requested an audio/video evaluation for the following concern(s):    Patient presents virtually with complaints of nausea and vomiting.  Symptoms began last night.  She is having associated epigastric pain.  No changes to her chronic diarrhea.  She denies hematemesis.  Denies known sick contacts.  She did eat lunch yesterday and the food just did not taste rate, symptoms began about 8 hours later.  She took Phenergan last night however she is out now.  Has been unable to keep liquids down today.  Has had some chills.    Review of Systems   Constitutional:  Positive for chills. Negative for fever.   Gastrointestinal:  Positive for abdominal pain, nausea and vomiting. Negative for diarrhea.   Neurological:  Negative for dizziness and light-headedness.       Prior to Visit Medications    Medication Sig Taking? Authorizing Provider   promethazine (PHENERGAN) 25 MG tablet Take 1 tablet by mouth every 6 hours as needed for Nausea Yes Linda Fox APRN   LORazepam (ATIVAN) 0.5 MG tablet Take 1 tablet by mouth 2 times daily as needed for Anxiety for up to 30 days. Max Daily Amount: 1 mg Yes Dayron Beckwith APRN - CNP   gabapentin (NEURONTIN) 400 MG capsule Take 1 capsule by mouth 3 times daily for 30 days. Intended supply: 30 days Yes Linda Fox APRN   atorvastatin (LIPITOR) 10 MG tablet Take 1 tablet by mouth at bedtime Yes Linda Fox APRN   lamoTRIgine (LAMICTAL) 25 MG tablet Take 4 tablets by mouth at bedtime Yes Provider, MD Aundrea       Social History     Tobacco Use    Smoking status: Every Day     Current packs/day: 0.25     Average packs/day: 0.3 packs/day for 23.5 years (5.9 ttl pk-yrs)     Types: Cigarettes     Start date: 10/10/2000    Smokeless tobacco: Never   Vaping Use    Vaping Use: Never used   Substance Use Topics    Alcohol use: No    Drug use: Never        Allergies   Allergen

## 2024-04-09 ENCOUNTER — OFFICE VISIT (OUTPATIENT)
Dept: NEUROLOGY | Age: 44
End: 2024-04-09
Payer: COMMERCIAL

## 2024-04-09 VITALS
HEART RATE: 98 BPM | HEIGHT: 59 IN | SYSTOLIC BLOOD PRESSURE: 129 MMHG | BODY MASS INDEX: 36.69 KG/M2 | DIASTOLIC BLOOD PRESSURE: 91 MMHG | WEIGHT: 182 LBS

## 2024-04-09 DIAGNOSIS — R56.9 CONVULSIONS, UNSPECIFIED CONVULSION TYPE (HCC): Primary | ICD-10-CM

## 2024-04-09 DIAGNOSIS — Z98.2 S/P VP SHUNT: ICD-10-CM

## 2024-04-09 DIAGNOSIS — I10 HYPERTENSION, UNSPECIFIED TYPE: ICD-10-CM

## 2024-04-09 PROCEDURE — 4004F PT TOBACCO SCREEN RCVD TLK: CPT | Performed by: PSYCHIATRY & NEUROLOGY

## 2024-04-09 PROCEDURE — G8417 CALC BMI ABV UP PARAM F/U: HCPCS | Performed by: PSYCHIATRY & NEUROLOGY

## 2024-04-09 PROCEDURE — 3074F SYST BP LT 130 MM HG: CPT | Performed by: PSYCHIATRY & NEUROLOGY

## 2024-04-09 PROCEDURE — 99203 OFFICE O/P NEW LOW 30 MIN: CPT | Performed by: PSYCHIATRY & NEUROLOGY

## 2024-04-09 PROCEDURE — 3080F DIAST BP >= 90 MM HG: CPT | Performed by: PSYCHIATRY & NEUROLOGY

## 2024-04-09 PROCEDURE — G8427 DOCREV CUR MEDS BY ELIG CLIN: HCPCS | Performed by: PSYCHIATRY & NEUROLOGY

## 2024-04-09 PROCEDURE — 1111F DSCHRG MED/CURRENT MED MERGE: CPT | Performed by: PSYCHIATRY & NEUROLOGY

## 2024-04-09 NOTE — PATIENT INSTRUCTIONS
YOU MUST CONFIRM YOUR APPOINTMENT 1 DAY PRIOR OR IT WILL BE CANCELLED!!   Our office will call you 3 times the day prior to your appointment in an attempt to confirm.  Please return our call ASAP or confirm your appt through [employer] no later than 3 pm the day before your appointment.  If we do not hear back from you by 3 pm to confirm, your appointment will be cancelled & someone will be added into that slot from our wait list.

## 2024-04-09 NOTE — PROGRESS NOTES
risk, benefits and treatment options as well as adherence to medication regimen and side effect from these medications.    EMU Summary 7/3 - 7/7:   The patient had a normal awake, drowsy, and asleep EEG during   their EMU admission. There was one event recorded of   unresponsiveness that was with normal EEG during the event,   consistent with psychogenic nonepileptic seizures. She has had   this type of event before however we were unable to capture one   of the events consisting of abnormal movements with loss of   awareness.     Assessment:     Diagnosis Orders   1. Convulsions, unspecified convulsion type (HCC)        2. S/P  shunt        3. Hypertension, unspecified type            History of nonepileptic seizure documented with VEEG and EMU evaluation back last year.  No episodes since she has been on Lamictal.  Continue Lamictal the same dose 100 mg daily  Follow-up with psychiatrist to recheck levels  Follow-up with her neurosurgeon regarding her chronic shunting  Discussed seizure precautions with the patient  No need for further neurological workup at this point  RTC as needed

## 2024-04-12 DIAGNOSIS — M54.16 LUMBAR BACK PAIN WITH RADICULOPATHY AFFECTING LEFT LOWER EXTREMITY: ICD-10-CM

## 2024-04-12 RX ORDER — GABAPENTIN 400 MG/1
400 CAPSULE ORAL 3 TIMES DAILY
Qty: 90 CAPSULE | Refills: 0 | Status: SHIPPED | OUTPATIENT
Start: 2024-04-12 | End: 2024-05-12

## 2024-04-12 NOTE — TELEPHONE ENCOUNTER
Future Appointments   Date Time Provider Department Center   4/19/2024 12:30 PM Preston Molina, PhD [place] PS[facility]CH[facility]   4/25/2024  3:30 PM Dayron Beckwith, APRN - CNP [place] [facility]   5/22/2024 12:30 PM SCHEDULE, HEALTHY WEIGHT HEALTHY [facility]   5/28/2024 11:15 AM Anna Mcbride MD HEALTHY [facility]       Last appt 4/2/24

## 2024-04-12 NOTE — TELEPHONE ENCOUNTER
Patient called wanting to know if you could send in her   gabapentin (NEURONTIN) 400 MG capsule  today and allow it to be picked up today instead of tomorrow since she has a ride today and doesn't drive.     Please send to [facility] [id number] - [place], [place] - [address] - P [phone] - F [phone]     Thank you

## 2024-04-15 ENCOUNTER — CARE COORDINATION (OUTPATIENT)
Dept: CARE COORDINATION | Age: 44
End: 2024-04-15

## 2024-04-15 NOTE — CARE COORDINATION
Patient has graduated from the Complex Case Management  program on 4/15/24.  Patient/family has the ability to self-manage at this time.  Care management goals have been completed. No further Ambulatory Care Manager follow up scheduled.    Feeling much better!!  Cousin missing- ex addict  Needs to reschedule /Dr Blankenship [phone]  3 way call to Dr Blankenship, scheduled 5/6 for 1 pm     Goals Addressed    None         Patient has Ambulatory Care Manager's contact information for any further questions, concerns, or needs.  Patients upcoming visits:    Future Appointments   Date Time Provider Department Center   4/19/2024 12:30 PM Preston Molina, PhD [place] PS[facility]OLOG [facility]   4/25/2024  3:30 PM Dayron Beckwith, APRN - CNP [place] [facility]   5/22/2024 12:30 PM SCHEDULE, HEALTHY WEIGHT HEALTHY WT [facility]   5/28/2024 11:15 AM Anna Mcbride MD HEALTHY WT [facility]

## 2024-04-18 ENCOUNTER — OFFICE VISIT (OUTPATIENT)
Dept: SURGERY | Age: 44
End: 2024-04-18

## 2024-04-18 VITALS — HEIGHT: 59 IN | WEIGHT: 182 LBS | BODY MASS INDEX: 36.69 KG/M2

## 2024-04-18 DIAGNOSIS — K43.6 INCARCERATED VENTRAL HERNIA: Primary | ICD-10-CM

## 2024-04-18 PROCEDURE — 99024 POSTOP FOLLOW-UP VISIT: CPT | Performed by: SURGERY

## 2024-04-19 ENCOUNTER — OFFICE VISIT (OUTPATIENT)
Dept: PSYCHOLOGY | Age: 44
End: 2024-04-19

## 2024-04-19 ENCOUNTER — APPOINTMENT (OUTPATIENT)
Dept: CT IMAGING | Age: 44
End: 2024-04-19
Payer: COMMERCIAL

## 2024-04-19 ENCOUNTER — HOSPITAL ENCOUNTER (EMERGENCY)
Age: 44
Discharge: HOME | End: 2024-04-19
Payer: COMMERCIAL

## 2024-04-19 VITALS
DIASTOLIC BLOOD PRESSURE: 79 MMHG | HEIGHT: 59 IN | TEMPERATURE: 98 F | RESPIRATION RATE: 20 BRPM | SYSTOLIC BLOOD PRESSURE: 132 MMHG | BODY MASS INDEX: 36.58 KG/M2 | HEART RATE: 81 BPM | OXYGEN SATURATION: 98 % | WEIGHT: 181.44 LBS

## 2024-04-19 DIAGNOSIS — F41.1 GAD (GENERALIZED ANXIETY DISORDER): Primary | ICD-10-CM

## 2024-04-19 DIAGNOSIS — R93.89 ABNORMAL FINDING ON CT SCAN: Primary | ICD-10-CM

## 2024-04-19 DIAGNOSIS — F90.2 ATTENTION DEFICIT HYPERACTIVITY DISORDER, COMBINED TYPE, MODERATE: ICD-10-CM

## 2024-04-19 LAB
ALBUMIN SERPL-MCNC: 4.5 G/DL (ref 3.4–5)
ALP SERPL-CCNC: 119 U/L (ref 40–129)
ALT SERPL-CCNC: 15 U/L (ref 10–40)
ANION GAP SERPL CALCULATED.3IONS-SCNC: 13 MMOL/L (ref 3–16)
AST SERPL-CCNC: 12 U/L (ref 15–37)
BASOPHILS # BLD: 0 K/UL (ref 0–0.2)
BASOPHILS NFR BLD: 0.6 %
BILIRUB DIRECT SERPL-MCNC: <0.2 MG/DL (ref 0–0.3)
BILIRUB INDIRECT SERPL-MCNC: ABNORMAL MG/DL (ref 0–1)
BILIRUB SERPL-MCNC: 0.3 MG/DL (ref 0–1)
BILIRUB UR QL STRIP.AUTO: NEGATIVE
BUN SERPL-MCNC: 12 MG/DL (ref 7–20)
CALCIUM SERPL-MCNC: 10.1 MG/DL (ref 8.3–10.6)
CHLORIDE SERPL-SCNC: 102 MMOL/L (ref 99–110)
CLARITY UR: CLEAR
CO2 SERPL-SCNC: 24 MMOL/L (ref 21–32)
COLOR UR: YELLOW
CREAT SERPL-MCNC: 0.6 MG/DL (ref 0.6–1.1)
DEPRECATED RDW RBC AUTO: 14.8 % (ref 12.4–15.4)
EOSINOPHIL # BLD: 0.2 K/UL (ref 0–0.6)
EOSINOPHIL NFR BLD: 2.5 %
GFR SERPLBLD CREATININE-BSD FMLA CKD-EPI: >90 ML/MIN/{1.73_M2}
GLUCOSE SERPL-MCNC: 128 MG/DL (ref 70–99)
GLUCOSE UR STRIP.AUTO-MCNC: NEGATIVE MG/DL
HCT VFR BLD AUTO: 44 % (ref 36–48)
HGB BLD-MCNC: 15 G/DL (ref 12–16)
HGB UR QL STRIP.AUTO: NEGATIVE
KETONES UR STRIP.AUTO-MCNC: NEGATIVE MG/DL
LACTATE BLDV-SCNC: 2 MMOL/L (ref 0.4–2)
LEUKOCYTE ESTERASE UR QL STRIP.AUTO: NEGATIVE
LIPASE SERPL-CCNC: 16 U/L (ref 13–60)
LYMPHOCYTES # BLD: 2.7 K/UL (ref 1–5.1)
LYMPHOCYTES NFR BLD: 33.3 %
MCH RBC QN AUTO: 29.1 PG (ref 26–34)
MCHC RBC AUTO-ENTMCNC: 34.1 G/DL (ref 31–36)
MCV RBC AUTO: 85.1 FL (ref 80–100)
MONOCYTES # BLD: 0.5 K/UL (ref 0–1.3)
MONOCYTES NFR BLD: 6.2 %
NEUTROPHILS # BLD: 4.6 K/UL (ref 1.7–7.7)
NEUTROPHILS NFR BLD: 57.4 %
NITRITE UR QL STRIP.AUTO: NEGATIVE
PH UR STRIP.AUTO: 6.5 [PH] (ref 5–8)
PLATELET # BLD AUTO: 303 K/UL (ref 135–450)
PMV BLD AUTO: 7.7 FL (ref 5–10.5)
POTASSIUM SERPL-SCNC: 4 MMOL/L (ref 3.5–5.1)
PROT SERPL-MCNC: 7.6 G/DL (ref 6.4–8.2)
PROT UR STRIP.AUTO-MCNC: NEGATIVE MG/DL
RBC # BLD AUTO: 5.17 M/UL (ref 4–5.2)
SODIUM SERPL-SCNC: 139 MMOL/L (ref 136–145)
SP GR UR STRIP.AUTO: 1.01 (ref 1–1.03)
UA COMPLETE W REFLEX CULTURE PNL UR: NORMAL
UA DIPSTICK W REFLEX MICRO PNL UR: NORMAL
URN SPEC COLLECT METH UR: NORMAL
UROBILINOGEN UR STRIP-ACNC: 0.2 E.U./DL
WBC # BLD AUTO: 8.1 K/UL (ref 4–11)

## 2024-04-19 PROCEDURE — 83605 ASSAY OF LACTIC ACID: CPT

## 2024-04-19 PROCEDURE — 6360000002 HC RX W HCPCS: Performed by: NURSE PRACTITIONER

## 2024-04-19 PROCEDURE — 2580000003 HC RX 258: Performed by: NURSE PRACTITIONER

## 2024-04-19 PROCEDURE — 81003 URINALYSIS AUTO W/O SCOPE: CPT

## 2024-04-19 PROCEDURE — 99285 EMERGENCY DEPT VISIT HI MDM: CPT

## 2024-04-19 PROCEDURE — 85025 COMPLETE CBC W/AUTO DIFF WBC: CPT

## 2024-04-19 PROCEDURE — 6360000004 HC RX CONTRAST MEDICATION: Performed by: NURSE PRACTITIONER

## 2024-04-19 PROCEDURE — 80048 BASIC METABOLIC PNL TOTAL CA: CPT

## 2024-04-19 PROCEDURE — 74177 CT ABD & PELVIS W/CONTRAST: CPT

## 2024-04-19 PROCEDURE — 80076 HEPATIC FUNCTION PANEL: CPT

## 2024-04-19 PROCEDURE — 99443 PR PHYS/QHP TELEPHONE EVALUATION 21-30 MIN: CPT | Performed by: PSYCHOLOGIST

## 2024-04-19 PROCEDURE — 83690 ASSAY OF LIPASE: CPT

## 2024-04-19 PROCEDURE — 96375 TX/PRO/DX INJ NEW DRUG ADDON: CPT

## 2024-04-19 PROCEDURE — 96365 THER/PROPH/DIAG IV INF INIT: CPT

## 2024-04-19 PROCEDURE — 96376 TX/PRO/DX INJ SAME DRUG ADON: CPT

## 2024-04-19 RX ORDER — 0.9 % SODIUM CHLORIDE 0.9 %
1000 INTRAVENOUS SOLUTION INTRAVENOUS ONCE
Status: COMPLETED | OUTPATIENT
Start: 2024-04-19 | End: 2024-04-19

## 2024-04-19 RX ORDER — FENTANYL CITRATE 50 UG/ML
50 INJECTION, SOLUTION INTRAMUSCULAR; INTRAVENOUS ONCE
Status: COMPLETED | OUTPATIENT
Start: 2024-04-19 | End: 2024-04-19

## 2024-04-19 RX ORDER — ONDANSETRON 2 MG/ML
4 INJECTION INTRAMUSCULAR; INTRAVENOUS ONCE
Status: COMPLETED | OUTPATIENT
Start: 2024-04-19 | End: 2024-04-19

## 2024-04-19 RX ORDER — FENTANYL CITRATE 50 UG/ML
25 INJECTION, SOLUTION INTRAMUSCULAR; INTRAVENOUS ONCE
Status: COMPLETED | OUTPATIENT
Start: 2024-04-19 | End: 2024-04-19

## 2024-04-19 RX ADMIN — ONDANSETRON 4 MG: 2 INJECTION INTRAMUSCULAR; INTRAVENOUS at 14:10

## 2024-04-19 RX ADMIN — FENTANYL CITRATE 50 MCG: 50 INJECTION INTRAMUSCULAR; INTRAVENOUS at 14:10

## 2024-04-19 RX ADMIN — IOPAMIDOL 75 ML: 755 INJECTION, SOLUTION INTRAVENOUS at 14:59

## 2024-04-19 RX ADMIN — FENTANYL CITRATE 25 MCG: 50 INJECTION INTRAMUSCULAR; INTRAVENOUS at 15:57

## 2024-04-19 RX ADMIN — SODIUM CHLORIDE 1000 ML: 9 INJECTION, SOLUTION INTRAVENOUS at 14:10

## 2024-04-19 RX ADMIN — Medication 12.5 MG: at 14:50

## 2024-04-19 ASSESSMENT — PAIN SCALES - GENERAL
PAINLEVEL_OUTOF10: 8
PAINLEVEL_OUTOF10: 8
PAINLEVEL_OUTOF10: 0
PAINLEVEL_OUTOF10: 8

## 2024-04-19 ASSESSMENT — PAIN DESCRIPTION - FREQUENCY: FREQUENCY: CONTINUOUS

## 2024-04-19 ASSESSMENT — PAIN DESCRIPTION - ORIENTATION: ORIENTATION: MID

## 2024-04-19 ASSESSMENT — PAIN DESCRIPTION - LOCATION: LOCATION: ABDOMEN

## 2024-04-19 ASSESSMENT — PAIN DESCRIPTION - DESCRIPTORS: DESCRIPTORS: SHARP

## 2024-04-19 ASSESSMENT — PAIN DESCRIPTION - PAIN TYPE: TYPE: ACUTE PAIN

## 2024-04-19 NOTE — DISCHARGE INSTRUCTIONS
Return to the emergency department for new or worsening symptoms including but not limited to, developing fevers not controlled by medications, worsening pain, inability to tolerate food or drink, or other symptoms/concerns.    Medication as prescribed.    Follow-up with your general surgeon for reevaluation next 1-2 weeks.  Call in the next 2-3 days or to schedule a follow-up appointment per Dr. Marhsall.

## 2024-04-19 NOTE — ED PROVIDER NOTES
[facility] EMERGENCY DEPARTMENT  EMERGENCY DEPARTMENT ENCOUNTER        Pt Name: Petrona Green  MRN: [id number]  Birthdate 1980  Date of evaluation: 4/19/2024  Provider: FREDRICK Kent - CNP  PCP: Linda Fox APRN  Note Started: 3:33 PM EDT 4/19/24      ALDAIR. I have evaluated this patient.        CHIEF COMPLAINT       Chief Complaint   Patient presents with    Abdominal Pain     Hernia surgery x2 months ago but yesterday went for follow up appt and had a cyst draining but then around 2000 the \"cyst\" filled back up and she reports that pain returned and she had a fever last night of 101 but today no fever during triage but still having pain. Patient called Dr. Marshall office today and was told he was in surgery and to come to ED for eval. Does have appt on Monday to follow up w/ him for the cyst       HISTORY OF PRESENT ILLNESS: 1 or more Elements     History from : Patient    Limitations to history : None    Petrona Green is a 43 y.o. nontoxic, well-appearing female who is 2 months status post periumbilical hernia surgery who presents to the emergency department with 8-9/10 for umbilical abdominal pain which had a follow-up appointment with her surgeon-Dr. Marshall on yesterday.  Per her report there was a cyst filled with fluid that Dr. Marshall drained.  Patient endorses that last night she began to experience nausea and vomiting and had a low-grade fever of 101.0.  Patient endorses warmth the skin overlying the periumbilical abdominal region.  Denies chills, sweats, lightheadedness, dizziness, presyncope, shortness of breath, diarrhea, urinary symptoms/retention, other symptoms/concerns.    Nursing Notes were all reviewed and agreed with or any disagreements were addressed in the HPI.    REVIEW OF SYSTEMS :      Review of Systems   Constitutional:  Positive for fever. Negative for chills, diaphoresis and fatigue.   HENT:  Negative for congestion and sore throat.   SPLEEN: Unremarkable. PANCREAS: Unremarkable ADRENAL GLANDS: Unremarkable. KIDNEYS: Kidneys are normal in size and contour and demonstrate symmetric enhancement.  There is no hydronephrosis.  There is a 2 mm nonobstructing right renal calculus. ABDOMINAL NODES: No adenopathy is appreciated. PELVIC ORGANS: The urinary bladder is unremarkable.  The uterus is surgically absent. PERITONEUM/MESENTERY/BOWEL: The stomach is unremarkable.  There is no bowel obstruction. There is no bowel wall thickening.  There is diverticulosis without evidence of diverticulitis.  The appendix is surgically absent. BONES/SOFT TISSUES: There is no acute osseous abnormality.  There is redemonstration of a fluid collection in the midline subcutaneous soft tissues which has overall decreased in size now measuring 2.5 x 1.8 x 3.6 cm (previously measured 3.8 x 3.1 x 4.9 cm).     1. Interval decrease in size of a fluid collection in the midline/paraumbilical subcutaneous soft tissues now measuring 2.5 x 1.8 x 3.6 cm (previously measured 3.8 x 3.1 x 4.9 cm). 2. 2 mm nonobstructing right renal calculus. 3. Diverticulosis without evidence of diverticulitis.           PROCEDURES   Unless otherwise noted below, none     Procedures    CRITICAL CARE TIME (.cctime)   CRITICAL CARE NOTE:  There was a high probability of clinically significant life-threatening deterioration of the patient's condition requiring my urgent intervention.    Total critical care time was at least 31 minutes of separately billable time.  Patient presents emergency department with periumbilical abdominal pain, low-grade fever one 101.0 last evening, accompanied by nausea and vomiting for which she had a follow-up appointment with her general surgeon-Dr. Marshall on yesterday at which time he had a cyst and patient began with symptoms last evening.  This includes vital sign monitoring, pulse oximetry monitoring, telemetry monitoring, clinical response to the IV medications,

## 2024-04-19 NOTE — PROGRESS NOTES
Behavioral Health Consultation  Preston Molina, Ph.D.  Psychologist  4/19/2024  12:30 PM EDT      Time spent with Patient: 30 minutes  This is patient's second [facility] appointment.    Reason for Consult: ADHD; anxiety  Referring Provider: Linda Fox, FREDRICK  [address]  [facility] [address]    Feedback for PCP:     Pt provided informed consent for the behavioral health program. Discussed with patient model of service to include the limits of confidentiality (i.e. abuse reporting, suicide intervention, etc.) and short-term intervention focused approach.  Pt indicated understanding.  Feedback given to PCP.    S:  Patient was very anxious about her medical procedures, after having hernia surgery. She said that she had to have the cyst drained yesterday after her hernia surgery and subsequent pain. She talked about how anxious she was to be back at the hospital, partially due to the treatment she thinks she receives from the staff. She said \"they know me by name, and that's not good.\" She said that the ones that she feels judged by do not know that being at the hospital again is the last place she would want to be, and that ones that seem to  her, do not know the the amount of pain she is in. The patient conducted the phone visit from outside the hospital where her procedure is to occur.    Caffeine: has 2 bottles of Coke/day  Sleep:fair  Exercise: not currently  Drugs/Etoh: denies both  Tobacco: every day smoker and has smoked about 1/3 of a pack/day for 23 years  SI/HI: denies    Mental health history:     Social History     Tobacco Use    Smoking status: Every Day     Current packs/day: 0.25     Average packs/day: 0.3 packs/day for 23.5 years (5.9 ttl pk-yrs)     Types: Cigarettes     Start date: 10/10/2000    Smokeless tobacco: Never   Substance Use Topics    Alcohol use: No        Illicit drugs:   Social History     Substance and Sexual Activity   Drug Use Never        O:  MSE:  Appearance: good

## 2024-04-19 NOTE — ED TRIAGE NOTES
Patient arrived to the ED ambulatory from home w/ complaints of abdominal pain.     Patient/EMS reports states Hernia surgery x2 months ago but yesterday went for follow up appt and had a cyst draining but then around 2000 the \"cyst\" filled back up and she reports that pain returned and she had a fever last night of 101 but today no fever during triage but still having pain. Patient called Dr. Marshall office today and was told he was in surgery and to come to ED for eval. Does have appt on Monday to follow up w/ him for the cyst    Patient A&O x 4, VSS w/ exception of elevated BP,

## 2024-04-22 ENCOUNTER — CARE COORDINATION (OUTPATIENT)
Dept: CARE COORDINATION | Age: 44
End: 2024-04-22

## 2024-04-22 ENCOUNTER — PATIENT MESSAGE (OUTPATIENT)
Dept: INTERNAL MEDICINE CLINIC | Age: 44
End: 2024-04-22

## 2024-04-22 ENCOUNTER — OFFICE VISIT (OUTPATIENT)
Dept: SURGERY | Age: 44
End: 2024-04-22

## 2024-04-22 VITALS
BODY MASS INDEX: 36.49 KG/M2 | DIASTOLIC BLOOD PRESSURE: 79 MMHG | WEIGHT: 181 LBS | SYSTOLIC BLOOD PRESSURE: 132 MMHG | HEIGHT: 59 IN

## 2024-04-22 DIAGNOSIS — K43.6 INCARCERATED VENTRAL HERNIA: Primary | ICD-10-CM

## 2024-04-22 DIAGNOSIS — N20.0 RENAL CALCULUS: Primary | ICD-10-CM

## 2024-04-22 PROCEDURE — 99024 POSTOP FOLLOW-UP VISIT: CPT | Performed by: SURGERY

## 2024-04-22 RX ORDER — TRAMADOL HYDROCHLORIDE 50 MG/1
50 TABLET ORAL EVERY 6 HOURS PRN
Qty: 3 TABLET | Refills: 0 | Status: SHIPPED | OUTPATIENT
Start: 2024-04-22 | End: 2024-04-25

## 2024-04-22 NOTE — PROGRESS NOTES
Petrona Green (:  1980) is a 43 y.o. female,Established patient, here for evaluation of the following chief complaint(s):  Post-Op Check (Seroma drainage)      Assessment & Plan   1. Incarcerated ventral hernia    Follow up with me as needed         Subjective   HPI  Was seen last week and a 5 ml seroma was aspirated. Presented to ER the next day and CT was done showing a residual fluid collection. No obvious findings on exam today so will observe for now.    Discussed with patient she has undergone 22 CT scans of her abdomen alone in the past 16 months. Strongly encouraged her to avoid the ER if possible and request imaging studies other than CT scan if possible.    Review of Systems       Objective   Physical Exam       Electronically signed by MICHELLE MCLEAN MD on 2024 at 11:01 AM        An electronic signature was used to authenticate this note.    --MICHELLE MCLEAN MD

## 2024-04-22 NOTE — CARE COORDINATION
Care Transitions Initial Follow Up Call    Call within 2 business days of discharge: Yes     Patient: Petrona Green Patient : 1980 MRN: [id number]    Last Discharge Facility       Date Complaint Diagnosis Description Type Department Provider    24 Abdominal Pain Abnormal finding on CT scan ED (DISCHARGE) [facility] ED             RARS: Readmission Risk Score: 16.3       Spoke with: patient  Saw Dr marshall, still some fluid  Passed  4 kidney stones this AM  Taking tylenol and ibuprofen but  having back pain to groin    Pt requested cancelling weight solution  Taking diet pills---hydroxycut      Discharge department/facility: [facility] ER    Non-face-to-face services provided:  Obtained and reviewed discharge summary and/or continuity of care documents  Currently on way to [facility] w Dr Marshall    Follow Up  Future Appointments   Date Time Provider Department Center   2024 11:30 AM Bo Marshall MD [id number] [facility]   2024  3:30 PM Dayron Beckwith, APRN - CNP [place] PSYCH [facility]   2024  1:00 PM Duke Beckman MD W ORTHO [facility]   5/10/2024  3:00 PM Preston Molina, PhD [place] PS[facility]CHOLOG [facility]   2024 12:30 PM SCHEDULE, HEALTHY WEIGHT HEALTHY WT [facility]   2024 11:15 AM Anna Mcbride MD HEALTHY WT [facility]       Evonne Jeter, SHANA
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
## 2024-04-23 NOTE — PROGRESS NOTES
Petrona Green (:  1980) is a 43 y.o. female,Established patient, here for evaluation of the following chief complaint(s):  Post-Op Check (Possible seroma)      Assessment & Plan   1. Incarcerated ventral hernia    Follow up with me as needed         Subjective   HPI  Doing well overall. Aspirated seroma today for about 4 mls. Follow up with me as needed    Review of Systems       Objective   Physical Exam       Electronically signed by MICHELLE MCLEAN MD on 2024 at 10:58 AM        An electronic signature was used to authenticate this note.    --MICHELLE MCLEAN MD

## 2024-04-25 ENCOUNTER — TELEMEDICINE (OUTPATIENT)
Dept: PSYCHIATRY | Age: 44
End: 2024-04-25
Payer: COMMERCIAL

## 2024-04-25 DIAGNOSIS — F41.1 GAD (GENERALIZED ANXIETY DISORDER): Primary | ICD-10-CM

## 2024-04-25 DIAGNOSIS — Z86.59 HISTORY OF ADHD: ICD-10-CM

## 2024-04-25 PROCEDURE — 99213 OFFICE O/P EST LOW 20 MIN: CPT | Performed by: REGISTERED NURSE

## 2024-04-25 PROCEDURE — 4004F PT TOBACCO SCREEN RCVD TLK: CPT | Performed by: REGISTERED NURSE

## 2024-04-25 PROCEDURE — G8417 CALC BMI ABV UP PARAM F/U: HCPCS | Performed by: REGISTERED NURSE

## 2024-04-25 PROCEDURE — G8428 CUR MEDS NOT DOCUMENT: HCPCS | Performed by: REGISTERED NURSE

## 2024-04-25 RX ORDER — FLUOXETINE HYDROCHLORIDE 20 MG/1
20 CAPSULE ORAL DAILY
Qty: 30 CAPSULE | Refills: 1 | Status: SHIPPED | OUTPATIENT
Start: 2024-04-25

## 2024-04-25 RX ORDER — LORAZEPAM 0.5 MG/1
0.5 TABLET ORAL DAILY PRN
Qty: 30 TABLET | Refills: 0 | Status: SHIPPED | OUTPATIENT
Start: 2024-04-25 | End: 2024-05-25

## 2024-04-25 ASSESSMENT — ANXIETY QUESTIONNAIRES
GAD7 TOTAL SCORE: 5
1. FEELING NERVOUS, ANXIOUS, OR ON EDGE: NEARLY EVERY DAY
2. NOT BEING ABLE TO STOP OR CONTROL WORRYING: SEVERAL DAYS
5. BEING SO RESTLESS THAT IT IS HARD TO SIT STILL: NOT AT ALL
3. WORRYING TOO MUCH ABOUT DIFFERENT THINGS: SEVERAL DAYS
7. FEELING AFRAID AS IF SOMETHING AWFUL MIGHT HAPPEN: NOT AT ALL
4. TROUBLE RELAXING: NOT AT ALL
IF YOU CHECKED OFF ANY PROBLEMS ON THIS QUESTIONNAIRE, HOW DIFFICULT HAVE THESE PROBLEMS MADE IT FOR YOU TO DO YOUR WORK, TAKE CARE OF THINGS AT HOME, OR GET ALONG WITH OTHER PEOPLE: VERY DIFFICULT
6. BECOMING EASILY ANNOYED OR IRRITABLE: NOT AT ALL

## 2024-04-25 ASSESSMENT — PATIENT HEALTH QUESTIONNAIRE - PHQ9
2. FEELING DOWN, DEPRESSED OR HOPELESS: NOT AT ALL
9. THOUGHTS THAT YOU WOULD BE BETTER OFF DEAD, OR OF HURTING YOURSELF: NOT AT ALL
SUM OF ALL RESPONSES TO PHQ QUESTIONS 1-9: 5
SUM OF ALL RESPONSES TO PHQ QUESTIONS 1-9: 5
3. TROUBLE FALLING OR STAYING ASLEEP: NOT AT ALL
5. POOR APPETITE OR OVEREATING: NOT AT ALL
SUM OF ALL RESPONSES TO PHQ QUESTIONS 1-9: 5
8. MOVING OR SPEAKING SO SLOWLY THAT OTHER PEOPLE COULD HAVE NOTICED. OR THE OPPOSITE, BEING SO FIGETY OR RESTLESS THAT YOU HAVE BEEN MOVING AROUND A LOT MORE THAN USUAL: MORE THAN HALF THE DAYS
6. FEELING BAD ABOUT YOURSELF - OR THAT YOU ARE A FAILURE OR HAVE LET YOURSELF OR YOUR FAMILY DOWN: NOT AT ALL
SUM OF ALL RESPONSES TO PHQ QUESTIONS 1-9: 5
7. TROUBLE CONCENTRATING ON THINGS, SUCH AS READING THE NEWSPAPER OR WATCHING TELEVISION: NEARLY EVERY DAY
4. FEELING TIRED OR HAVING LITTLE ENERGY: NOT AT ALL
SUM OF ALL RESPONSES TO PHQ9 QUESTIONS 1 & 2: 0
1. LITTLE INTEREST OR PLEASURE IN DOING THINGS: NOT AT ALL
10. IF YOU CHECKED OFF ANY PROBLEMS, HOW DIFFICULT HAVE THESE PROBLEMS MADE IT FOR YOU TO DO YOUR WORK, TAKE CARE OF THINGS AT HOME, OR GET ALONG WITH OTHER PEOPLE: SOMEWHAT DIFFICULT
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
## 2024-05-02 DIAGNOSIS — E78.2 MIXED HYPERLIPIDEMIA: ICD-10-CM

## 2024-05-02 NOTE — TELEPHONE ENCOUNTER
Pt is requesting a refill for her atorvastatin (LIPITOR) 10 MG tablet     Please send to [facility] on [address]    Thank you

## 2024-05-03 RX ORDER — ATORVASTATIN CALCIUM 10 MG/1
10 TABLET, FILM COATED ORAL NIGHTLY
Qty: 90 TABLET | Refills: 1 | Status: SHIPPED | OUTPATIENT
Start: 2024-05-03

## 2024-05-03 NOTE — TELEPHONE ENCOUNTER
Future Appointments   Date Time Provider Department Center   5/6/2024  1:00 PM Duke Beckman MD W ORTHO [facility]   5/10/2024  3:00 PM Preston Molina, PhD [place] PS[facility]CHOLOG [facility]   5/28/2024 11:15 AM Anna Mcbride MD HEALTHY WT [facility]       Last appt 4/2/24

## 2024-05-08 ENCOUNTER — TELEPHONE (OUTPATIENT)
Dept: CARDIOLOGY CLINIC | Age: 44
End: 2024-05-08

## 2024-05-08 NOTE — TELEPHONE ENCOUNTER
Petrona called in to make her mother an appt but then stated she would like to be seen as well due to her experiencing her heart racing every day all day. Petrona uses an apple watch and on the phone she stated HR was 120 @ rest. Petrona shared that she had a monitor placed last year but went into the hospital at that time and was unable to complete. If need be, she would like to try monitor again. Petrona also shared she would need the hypoallergenic patches if monitor needed.    I have Petrona scheduled to see Mitul 5/15 at 1:15p along with her mother at 1p.

## 2024-05-09 DIAGNOSIS — M54.16 LUMBAR BACK PAIN WITH RADICULOPATHY AFFECTING LEFT LOWER EXTREMITY: ICD-10-CM

## 2024-05-09 RX ORDER — GABAPENTIN 400 MG/1
400 CAPSULE ORAL 3 TIMES DAILY
Qty: 90 CAPSULE | Refills: 0 | Status: SHIPPED | OUTPATIENT
Start: 2024-05-09 | End: 2024-06-08

## 2024-05-09 NOTE — TELEPHONE ENCOUNTER
Future Appointments   Date Time Provider Department Center   5/10/2024  3:00 PM Preston Molina, PhD [place] PSYCHOLOG [facility]   5/15/2024  1:15 PM Jose A Bauman MD Deyvi I [facility]   5/28/2024 11:15 AM Anna Mcbride MD HEALTHY WT [facility]   5/28/2024  2:00 PM Duke Beckman MD W ORTHO [facility]     Last appt  3/21/24

## 2024-05-09 NOTE — TELEPHONE ENCOUNTER
Pt is requesting a refill for her gabapentin (NEURONTIN) 400 MG capsule     Please send to [facility] on [address]    Thank you

## 2024-05-10 ENCOUNTER — OFFICE VISIT (OUTPATIENT)
Dept: PSYCHOLOGY | Age: 44
End: 2024-05-10

## 2024-05-10 DIAGNOSIS — E78.2 MIXED HYPERLIPIDEMIA: ICD-10-CM

## 2024-05-10 DIAGNOSIS — F41.1 GAD (GENERALIZED ANXIETY DISORDER): Primary | ICD-10-CM

## 2024-05-10 DIAGNOSIS — F90.2 ATTENTION DEFICIT HYPERACTIVITY DISORDER, COMBINED TYPE, MODERATE: ICD-10-CM

## 2024-05-10 PROCEDURE — 90832 PSYTX W PT 30 MINUTES: CPT | Performed by: PSYCHOLOGIST

## 2024-05-10 RX ORDER — ATORVASTATIN CALCIUM 10 MG/1
10 TABLET, FILM COATED ORAL NIGHTLY
Qty: 90 TABLET | Refills: 1 | Status: SHIPPED | OUTPATIENT
Start: 2024-05-10

## 2024-05-10 NOTE — TELEPHONE ENCOUNTER
Future Appointments   Date Time Provider Department Center   5/10/2024  3:00 PM Preston Molina, PhD [place] PS[facility]OLOG [facility]   5/15/2024  1:15 PM Jose A Bauman MD [facility] I [facility]   5/28/2024 11:15 AM Anna Mcbride MD HEALTHY WT [facility]   5/28/2024  2:00 PM Duke Beckman MD W ORTHO [facility]       Last appt 4/2/24

## 2024-05-10 NOTE — PROGRESS NOTES
Behavioral Health Consultation  Preston Molina, Ph.D.  Psychologist  5/10/2024  3:00 PM EDT      Time spent with Patient: 30 minutes  This is patient's third [facility] appointment.    Reason for Consult: ADHD; anxiety  Referring Provider: Linda Fox APRN  [address]  [facility] [address]    Feedback for PCP:     Pt provided informed consent for the behavioral health program. Discussed with patient model of service to include the limits of confidentiality (i.e. abuse reporting, suicide intervention, etc.) and short-term intervention focused approach.  Pt indicated understanding.  Feedback given to PCP.    This was an audio-only telephone visit. The CPT code for a telephone visit appears to be the same one as an office visit,according to Telehealth.HHS.gov.    S:  Patient said that they have not found her cousin yet who has been missing for over a month. This is distressing to her, and all she can do is wait for some word. She is having some difficulty with her prescriptions, as she has been taking an anti-seizure med, a PRN benzo (Ativan), and meds for her ADHD. She said that she has been talking with her prescribers but this is an ongoing issue. She seems to be doing okay otherwise, and appears to have positive mood and affect.    Caffeine: has 2 bottles of Coke/day  Sleep:fair  Exercise: not currently  Drugs/Etoh: denies both  Tobacco: every day smoker and has smoked about 1/3 of a pack/day for 23 years  SI/HI: denies    Mental health history:     Social History     Tobacco Use    Smoking status: Every Day     Current packs/day: 0.25     Average packs/day: 0.3 packs/day for 23.6 years (5.9 ttl pk-yrs)     Types: Cigarettes     Start date: 10/10/2000    Smokeless tobacco: Never   Substance Use Topics    Alcohol use: No        Illicit drugs:   Social History     Substance and Sexual Activity   Drug Use Never        O:  MSE:  Appearance: good hygiene   Attitude: cooperative and friendly  Consciousness:

## 2024-05-13 NOTE — PROGRESS NOTES
[facility]      Cardiology Consult    Petrona Green  1980    May 15, 2024    Referring Physician: Linda Fox APRN  Reason for Referral: Abnormal EKG    CC: \"I still have palpitations\"     HPI:  The patient is 43 y.o. female with a past medical history significant for HLD, pre-diabetes, and smoking who presents for evaluation of chest pains and tachycardia. She was seen in the ER 10/24/23 with low back pain. That night she began having chest pressure. The sensation would wax and wane but did not resolve so she was seen again in the ER 10/26/23. Her troponin was normal and she was discharged home. She continues to have the continuous chest pressure sensation that will worse without any particular trigger for several hours and return back to baseline. She reports continued palpitations as she described at her last office visit. She states she never completed the 48 hour monitor that was ordered previously, as she did not have transportation. The episodes occur daily and are brief in nature. The episodes resolve without particular intervention. She denies any chest pains or worsening shortness of breath. She reports medication compliance and is tolerating. She denies any abnormal bleeding or bruising. She denies exertional chest pain/pressure, dyspnea at rest, worsening LUA, PND, orthopnea, lightheadedness, weight changes, changes in LE edema, and syncope.    Past Medical History:   Diagnosis Date    ADHD (attention deficit hyperactivity disorder) 1988    Depression with anxiety     Difficult intubation     airway swelled up    Drug-seeking behavior     Encounter for imaging to screen for metal prior to MRI 06/01/2021    MRI Conditional [employer] Non-Programmable shunt model#49429 implanted 10/30/2020 at [facility]. Normal Mode. 1.5T or 3.0T.    ESBL (extended spectrum beta-lactamase) producing bacteria infection 11/06/2019    urine    Functional ovarian cysts 2008    rt ovary

## 2024-05-14 ENCOUNTER — OFFICE VISIT (OUTPATIENT)
Dept: INTERNAL MEDICINE CLINIC | Age: 44
End: 2024-05-14
Payer: COMMERCIAL

## 2024-05-14 VITALS
DIASTOLIC BLOOD PRESSURE: 78 MMHG | HEIGHT: 59 IN | SYSTOLIC BLOOD PRESSURE: 123 MMHG | HEART RATE: 90 BPM | WEIGHT: 182 LBS | BODY MASS INDEX: 36.69 KG/M2

## 2024-05-14 DIAGNOSIS — F90.2 ATTENTION DEFICIT HYPERACTIVITY DISORDER (ADHD), COMBINED TYPE: Primary | ICD-10-CM

## 2024-05-14 DIAGNOSIS — F41.9 ANXIETY: ICD-10-CM

## 2024-05-14 PROCEDURE — 4004F PT TOBACCO SCREEN RCVD TLK: CPT | Performed by: NURSE PRACTITIONER

## 2024-05-14 PROCEDURE — G8417 CALC BMI ABV UP PARAM F/U: HCPCS | Performed by: NURSE PRACTITIONER

## 2024-05-14 PROCEDURE — G8427 DOCREV CUR MEDS BY ELIG CLIN: HCPCS | Performed by: NURSE PRACTITIONER

## 2024-05-14 PROCEDURE — 3074F SYST BP LT 130 MM HG: CPT | Performed by: NURSE PRACTITIONER

## 2024-05-14 PROCEDURE — 99213 OFFICE O/P EST LOW 20 MIN: CPT | Performed by: NURSE PRACTITIONER

## 2024-05-14 PROCEDURE — G2211 COMPLEX E/M VISIT ADD ON: HCPCS | Performed by: NURSE PRACTITIONER

## 2024-05-14 PROCEDURE — 3078F DIAST BP <80 MM HG: CPT | Performed by: NURSE PRACTITIONER

## 2024-05-14 NOTE — PROGRESS NOTES
Date: 5/14/2024                                               Subjective/Objective:     Chief Complaint   Patient presents with    Discuss Medications     ADHD meds; psych will not do it without gene sight results from previous provider       HPI    Petrona Green is a 44 yo female, visit today for ADHD, anxiety.     ADHD - previous treatment was through outside psychiatry - vyvanse (worsened her headaches), Adderall (was very helpful). Really struggling to stay focused.     Anxiety - previous treatment through outside psychiatry - lorazepam.    Following with psychiatry NP (Dayron) - on lorazepam. Fluoxetine was not helpful.    Working with psychologist which she finds really helpful.          Patient Active Problem List    Diagnosis Date Noted    Multiple drug resistant organism (MDRO) culture positive 02/04/2023    Renal stones 02/04/2023    Seizure-like activity (HCC) 10/27/2022    History of extended-spectrum beta-lactamase producing Escherichia coli infection 10/10/2022    History of creation of ventriculoperitoneal shunt 10/10/2022    Allergy to multiple antibiotics 10/10/2022    Abdominal wall cellulitis 10/09/2022    DM2 (diabetes mellitus, type 2) (HCC) 10/09/2022    HTN (hypertension) 10/09/2022    Cellulitis 10/09/2022    Abdominal pain in female 09/25/2022    Nausea and vomiting 09/25/2022    COVID 09/25/2022    Intermittent small bowel obstruction due to adhesions (HCC) 09/14/2022    Abdominal wall mass 09/14/2022    Infection due to extended-spectrum beta-lactamase-producing Escherichia coli 09/01/2022    Kidney stone 09/01/2022    Cyst and pseudocyst of pancreas 10/13/2020    Abdominal wall fluid collections 03/18/2024    Smoking 03/18/2024    Lactic acid acidosis 03/18/2024    Morbid obesity due to excess calories (HCC) 03/12/2024    Neutrophilia 03/12/2024    Postoperative nausea and vomiting 03/12/2024    Umbilical hernia without obstruction and without gangrene 03/11/2024    Postoperative fever

## 2024-05-15 ENCOUNTER — OFFICE VISIT (OUTPATIENT)
Dept: CARDIOLOGY CLINIC | Age: 44
End: 2024-05-15
Payer: COMMERCIAL

## 2024-05-15 ENCOUNTER — TELEPHONE (OUTPATIENT)
Dept: PSYCHOLOGY | Age: 44
End: 2024-05-15

## 2024-05-15 VITALS
WEIGHT: 183 LBS | DIASTOLIC BLOOD PRESSURE: 76 MMHG | BODY MASS INDEX: 36.89 KG/M2 | OXYGEN SATURATION: 96 % | SYSTOLIC BLOOD PRESSURE: 122 MMHG | HEIGHT: 59 IN | HEART RATE: 90 BPM

## 2024-05-15 DIAGNOSIS — R00.2 PALPITATIONS: Primary | ICD-10-CM

## 2024-05-15 DIAGNOSIS — R00.0 SINUS TACHYCARDIA: ICD-10-CM

## 2024-05-15 DIAGNOSIS — R00.0 SINUS TACHYCARDIA: Primary | ICD-10-CM

## 2024-05-15 DIAGNOSIS — F17.210 CIGARETTE NICOTINE DEPENDENCE WITHOUT COMPLICATION: ICD-10-CM

## 2024-05-15 PROCEDURE — 3078F DIAST BP <80 MM HG: CPT | Performed by: INTERNAL MEDICINE

## 2024-05-15 PROCEDURE — 99214 OFFICE O/P EST MOD 30 MIN: CPT | Performed by: INTERNAL MEDICINE

## 2024-05-15 PROCEDURE — G8427 DOCREV CUR MEDS BY ELIG CLIN: HCPCS | Performed by: INTERNAL MEDICINE

## 2024-05-15 PROCEDURE — 3074F SYST BP LT 130 MM HG: CPT | Performed by: INTERNAL MEDICINE

## 2024-05-15 PROCEDURE — G8417 CALC BMI ABV UP PARAM F/U: HCPCS | Performed by: INTERNAL MEDICINE

## 2024-05-15 PROCEDURE — 4004F PT TOBACCO SCREEN RCVD TLK: CPT | Performed by: INTERNAL MEDICINE
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
## 2024-05-17 ENCOUNTER — APPOINTMENT (OUTPATIENT)
Dept: GENERAL RADIOLOGY | Age: 44
End: 2024-05-17
Payer: COMMERCIAL

## 2024-05-17 ENCOUNTER — HOSPITAL ENCOUNTER (EMERGENCY)
Age: 44
Discharge: HOME | End: 2024-05-17
Payer: COMMERCIAL

## 2024-05-17 VITALS
OXYGEN SATURATION: 96 % | DIASTOLIC BLOOD PRESSURE: 85 MMHG | WEIGHT: 184.75 LBS | HEIGHT: 59 IN | HEART RATE: 84 BPM | RESPIRATION RATE: 16 BRPM | BODY MASS INDEX: 37.24 KG/M2 | SYSTOLIC BLOOD PRESSURE: 138 MMHG | TEMPERATURE: 98.8 F

## 2024-05-17 DIAGNOSIS — R07.89 ATYPICAL CHEST PAIN: Primary | ICD-10-CM

## 2024-05-17 DIAGNOSIS — F41.1 ANXIETY STATE: ICD-10-CM

## 2024-05-17 LAB
ANION GAP SERPL CALCULATED.3IONS-SCNC: 13 MMOL/L (ref 3–16)
BASOPHILS # BLD: 0 K/UL (ref 0–0.2)
BASOPHILS NFR BLD: 0.4 %
BUN SERPL-MCNC: 13 MG/DL (ref 7–20)
CALCIUM SERPL-MCNC: 8.9 MG/DL (ref 8.3–10.6)
CHLORIDE SERPL-SCNC: 106 MMOL/L (ref 99–110)
CO2 SERPL-SCNC: 22 MMOL/L (ref 21–32)
CREAT SERPL-MCNC: 0.5 MG/DL (ref 0.6–1.1)
D-DIMER QUANTITATIVE: 0.31 UG/ML FEU (ref 0–0.6)
DEPRECATED RDW RBC AUTO: 14.8 % (ref 12.4–15.4)
EOSINOPHIL # BLD: 0.4 K/UL (ref 0–0.6)
EOSINOPHIL NFR BLD: 3.7 %
GFR SERPLBLD CREATININE-BSD FMLA CKD-EPI: >90 ML/MIN/{1.73_M2}
GLUCOSE SERPL-MCNC: 147 MG/DL (ref 70–99)
HCT VFR BLD AUTO: 41.6 % (ref 36–48)
HGB BLD-MCNC: 14.7 G/DL (ref 12–16)
LYMPHOCYTES # BLD: 2.9 K/UL (ref 1–5.1)
LYMPHOCYTES NFR BLD: 30.8 %
MCH RBC QN AUTO: 29.7 PG (ref 26–34)
MCHC RBC AUTO-ENTMCNC: 35.3 G/DL (ref 31–36)
MCV RBC AUTO: 84.3 FL (ref 80–100)
MONOCYTES # BLD: 0.6 K/UL (ref 0–1.3)
MONOCYTES NFR BLD: 6 %
NEUTROPHILS # BLD: 5.6 K/UL (ref 1.7–7.7)
NEUTROPHILS NFR BLD: 59.1 %
PLATELET # BLD AUTO: 236 K/UL (ref 135–450)
PLATELET BLD QL SMEAR: ADEQUATE
PMV BLD AUTO: 8.6 FL (ref 5–10.5)
POTASSIUM SERPL-SCNC: 3.7 MMOL/L (ref 3.5–5.1)
RBC # BLD AUTO: 4.94 M/UL (ref 4–5.2)
SLIDE REVIEW: NORMAL
SODIUM SERPL-SCNC: 141 MMOL/L (ref 136–145)
TROPONIN, HIGH SENSITIVITY: <6 NG/L (ref 0–14)
TROPONIN, HIGH SENSITIVITY: <6 NG/L (ref 0–14)
WBC # BLD AUTO: 9.5 K/UL (ref 4–11)

## 2024-05-17 PROCEDURE — 85025 COMPLETE CBC W/AUTO DIFF WBC: CPT

## 2024-05-17 PROCEDURE — 80048 BASIC METABOLIC PNL TOTAL CA: CPT

## 2024-05-17 PROCEDURE — 71046 X-RAY EXAM CHEST 2 VIEWS: CPT

## 2024-05-17 PROCEDURE — 93005 ELECTROCARDIOGRAM TRACING: CPT | Performed by: STUDENT IN AN ORGANIZED HEALTH CARE EDUCATION/TRAINING PROGRAM

## 2024-05-17 PROCEDURE — 85379 FIBRIN DEGRADATION QUANT: CPT

## 2024-05-17 PROCEDURE — 99285 EMERGENCY DEPT VISIT HI MDM: CPT

## 2024-05-17 PROCEDURE — 6370000000 HC RX 637 (ALT 250 FOR IP): Performed by: NURSE PRACTITIONER

## 2024-05-17 PROCEDURE — 84484 ASSAY OF TROPONIN QUANT: CPT

## 2024-05-17 RX ORDER — ONDANSETRON 4 MG/1
4 TABLET, ORALLY DISINTEGRATING ORAL ONCE
Status: COMPLETED | OUTPATIENT
Start: 2024-05-17 | End: 2024-05-17

## 2024-05-17 RX ORDER — ASPIRIN 81 MG/1
324 TABLET, CHEWABLE ORAL ONCE
Status: COMPLETED | OUTPATIENT
Start: 2024-05-17 | End: 2024-05-17

## 2024-05-17 RX ORDER — LIDOCAINE 4 G/G
1 PATCH TOPICAL ONCE
Status: DISCONTINUED | OUTPATIENT
Start: 2024-05-17 | End: 2024-05-17 | Stop reason: HOSPADM

## 2024-05-17 RX ORDER — ONDANSETRON 4 MG/1
4 TABLET, FILM COATED ORAL EVERY 8 HOURS PRN
Qty: 20 TABLET | Refills: 0 | Status: SHIPPED | OUTPATIENT
Start: 2024-05-17

## 2024-05-17 RX ORDER — LIDOCAINE 50 MG/G
1 PATCH TOPICAL DAILY
Qty: 10 PATCH | Refills: 0 | Status: SHIPPED | OUTPATIENT
Start: 2024-05-17 | End: 2024-05-27

## 2024-05-17 RX ORDER — HYDROXYZINE PAMOATE 25 MG/1
25 CAPSULE ORAL ONCE
Status: COMPLETED | OUTPATIENT
Start: 2024-05-17 | End: 2024-05-17

## 2024-05-17 RX ORDER — HYDROXYZINE PAMOATE 25 MG/1
25 CAPSULE ORAL 3 TIMES DAILY PRN
Qty: 12 CAPSULE | Refills: 0 | Status: SHIPPED | OUTPATIENT
Start: 2024-05-17 | End: 2024-05-22

## 2024-05-17 RX ADMIN — ONDANSETRON 4 MG: 4 TABLET, ORALLY DISINTEGRATING ORAL at 19:39

## 2024-05-17 RX ADMIN — ASPIRIN 324 MG: 81 TABLET, CHEWABLE ORAL at 18:56

## 2024-05-17 RX ADMIN — HYDROXYZINE PAMOATE 25 MG: 25 CAPSULE ORAL at 20:25

## 2024-05-17 ASSESSMENT — ENCOUNTER SYMPTOMS
SORE THROAT: 0
COUGH: 0
DIARRHEA: 0
BACK PAIN: 0
EYE PAIN: 0
SHORTNESS OF BREATH: 1
VOMITING: 0
ANAL BLEEDING: 0
ABDOMINAL PAIN: 0
NAUSEA: 1

## 2024-05-17 ASSESSMENT — HEART SCORE: ECG: NORMAL

## 2024-05-17 NOTE — ED TRIAGE NOTES
.Petrona Green is a 43 y.o. female brought herself to the ER for left sided chest pain that started about an hour ago when the patient was at rest. The patient states that the pain radiates to her left shoulder. The patient states that she is also having SOB. The patient states that her pain is a 8/10. The patient is alert and oriented with an open and patent airway.

## 2024-05-18 LAB
EKG ATRIAL RATE: 97 BPM
EKG DIAGNOSIS: NORMAL
EKG P AXIS: 58 DEGREES
EKG P-R INTERVAL: 124 MS
EKG Q-T INTERVAL: 366 MS
EKG QRS DURATION: 80 MS
EKG QTC CALCULATION (BAZETT): 464 MS
EKG R AXIS: -2 DEGREES
EKG T AXIS: 56 DEGREES
EKG VENTRICULAR RATE: 97 BPM

## 2024-05-18 PROCEDURE — 93010 ELECTROCARDIOGRAM REPORT: CPT | Performed by: INTERNAL MEDICINE

## 2024-05-18 NOTE — ED PROVIDER NOTES
[facility] EMERGENCY DEPARTMENT  EMERGENCY DEPARTMENT ENCOUNTER        Pt Name: Petrona Green  MRN: [id number]  Birthdate 1980  Date of evaluation: 5/17/2024  Provider: FREDRICK Kent - CNP  PCP: Linda Fox APRN  Note Started: 8:33 PM EDT 5/17/24      ALDAIR. I have evaluated this patient.        CHIEF COMPLAINT       Chief Complaint   Patient presents with    Chest Pain     Patient states that she has left sided chest pain that started an hour ago and radiates into her left shoulder, 8/10       HISTORY OF PRESENT ILLNESS: 1 or more Elements     History from : Patient    Limitations to history : None    Petrona Green is a 43 y.o. nontoxic, well-appearing, anxious female who presents to the emergency department for evaluation of constant, radiating to left arm, exertional, \"sharp\" 8/10 left-sided chest pain that onset at 1700 hrs. as she was at rest.  Accompanying symptoms include nausea and shortness of breath.  Denies vomiting, lightheadedness, dizziness, presyncope, diaphoresis, fever, chills, sweats, cough, change in ability to smell/taste, mops this, leg/calf pain or swelling, nasal chest congestion, headaches, body aches, abdominal pain, diarrhea, urinary symptoms/retention, other symptoms/concerns.    Nursing Notes were all reviewed and agreed with or any disagreements were addressed in the HPI.    REVIEW OF SYSTEMS :      Review of Systems   Constitutional:  Negative for chills, diaphoresis, fatigue and fever.   HENT:  Negative for congestion and sore throat.    Eyes:  Negative for pain and visual disturbance.   Respiratory:  Positive for shortness of breath. Negative for cough.    Cardiovascular:  Positive for chest pain. Negative for leg swelling.   Gastrointestinal:  Positive for nausea. Negative for abdominal pain, anal bleeding, diarrhea and vomiting.   Genitourinary:  Negative for difficulty urinating, dysuria, frequency and urgency.   Musculoskeletal:

## 2024-05-18 NOTE — DISCHARGE INSTRUCTIONS
Return to the emergency department for new or worsening symptoms including, limited to, developing chest pain accompanied by nausea, vomiting, dizziness, shortness of breath, passing out, feelings of you are going to pass out, or other symptoms/concerns.    Medications as prescribed.  Be aware that Vistaril does cause drowsiness and he should not otherwise drink, drive, operate machinery, or sign important/legal documents while taking this medication/do not take this medication as you can dedicate least 8 hours of sleep and thereafter perform the after mentioned activities.    Follow-up with your primary care provider as well as with your cardiologist for reevaluation next 3-4 days.

## 2024-05-20 ENCOUNTER — CARE COORDINATION (OUTPATIENT)
Dept: CARE COORDINATION | Age: 44
End: 2024-05-20

## 2024-05-20 ENCOUNTER — OFFICE VISIT (OUTPATIENT)
Dept: INTERNAL MEDICINE CLINIC | Age: 44
End: 2024-05-20
Payer: COMMERCIAL

## 2024-05-20 VITALS
DIASTOLIC BLOOD PRESSURE: 85 MMHG | HEART RATE: 105 BPM | BODY MASS INDEX: 36.29 KG/M2 | SYSTOLIC BLOOD PRESSURE: 125 MMHG | OXYGEN SATURATION: 97 % | HEIGHT: 59 IN | WEIGHT: 180 LBS

## 2024-05-20 DIAGNOSIS — F90.2 ATTENTION DEFICIT HYPERACTIVITY DISORDER (ADHD), COMBINED TYPE: Primary | ICD-10-CM

## 2024-05-20 DIAGNOSIS — F41.9 ANXIETY: ICD-10-CM

## 2024-05-20 PROCEDURE — 3079F DIAST BP 80-89 MM HG: CPT | Performed by: STUDENT IN AN ORGANIZED HEALTH CARE EDUCATION/TRAINING PROGRAM

## 2024-05-20 PROCEDURE — G8427 DOCREV CUR MEDS BY ELIG CLIN: HCPCS | Performed by: STUDENT IN AN ORGANIZED HEALTH CARE EDUCATION/TRAINING PROGRAM

## 2024-05-20 PROCEDURE — 4004F PT TOBACCO SCREEN RCVD TLK: CPT | Performed by: STUDENT IN AN ORGANIZED HEALTH CARE EDUCATION/TRAINING PROGRAM

## 2024-05-20 PROCEDURE — 3074F SYST BP LT 130 MM HG: CPT | Performed by: STUDENT IN AN ORGANIZED HEALTH CARE EDUCATION/TRAINING PROGRAM

## 2024-05-20 PROCEDURE — G8417 CALC BMI ABV UP PARAM F/U: HCPCS | Performed by: STUDENT IN AN ORGANIZED HEALTH CARE EDUCATION/TRAINING PROGRAM

## 2024-05-20 PROCEDURE — 99213 OFFICE O/P EST LOW 20 MIN: CPT | Performed by: STUDENT IN AN ORGANIZED HEALTH CARE EDUCATION/TRAINING PROGRAM

## 2024-05-20 RX ORDER — DEXTROAMPHETAMINE SACCHARATE, AMPHETAMINE ASPARTATE MONOHYDRATE, DEXTROAMPHETAMINE SULFATE AND AMPHETAMINE SULFATE 2.5; 2.5; 2.5; 2.5 MG/1; MG/1; MG/1; MG/1
10 CAPSULE, EXTENDED RELEASE ORAL DAILY
Qty: 30 CAPSULE | Refills: 0 | Status: SHIPPED | OUTPATIENT
Start: 2024-05-20 | End: 2024-06-19

## 2024-05-20 NOTE — CARE COORDINATION
Care Transitions Initial Follow Up Call    Call within 2 business days of discharge: Yes     Patient: Petrona Green Patient : 1980 MRN: [id number]    Last Discharge Facility       Date Complaint Diagnosis Description Type Department Provider    24 Chest Pain Atypical chest pain ... ED (DISCHARGE) [facility] ED             RARS: Readmission Risk Score: 16.3       Spoke with: patient    Reports she had a uber  last week who became very inappropriate on her ride home from office visit last week.  Did have telephone visit w psych and anxiety       Discharge department/facility: [facility] ER    Non-face-to-face services provided:  Obtained and reviewed discharge summary and/or continuity of care documents    Follow Up  Future Appointments   Date Time Provider Department Center   2024 11:15 AM Anna Mcbride MD HEALTHY WT [facility]   2024  2:00 PM Duke Beckman MD  ORTHO [facility]       Evonne Jeter RN

## 2024-05-31 NOTE — PROGRESS NOTES
Blood Pressure Mother     Diabetes Mother     High Cholesterol Mother     Depression Mother     Heart Failure Mother     COPD Mother     Cirrhosis Father     Depression Sister     Broken Bones Sister         2005    Scoliosis Sister         My other sister had spinal fusion surgery    Scoliosis Sister     Diabetes Maternal Grandmother     High Blood Pressure Maternal Grandmother     High Cholesterol Maternal Grandmother     Heart Disease Maternal Grandfather     High Blood Pressure Maternal Grandfather     Heart Disease Paternal Grandmother     Stroke Paternal Grandfather     Rheum Arthritis Neg Hx     Osteoarthritis Neg Hx     Asthma Neg Hx     Breast Cancer Neg Hx     Cancer Neg Hx     Hypertension Neg Hx     Migraines Neg Hx     Ovarian Cancer Neg Hx     Rashes/Skin Problems Neg Hx     Seizures Neg Hx     Thyroid Disease Neg Hx          Current Outpatient Medications:     amphetamine-dextroamphetamine (ADDERALL XR) 10 MG extended release capsule, Take 1 capsule by mouth daily for 30 days. Max Daily Amount: 10 mg, Disp: 30 capsule, Rfl: 0    ondansetron (ZOFRAN) 4 MG tablet, Take 1 tablet by mouth every 8 hours as needed for Nausea, Disp: 20 tablet, Rfl: 0    atorvastatin (LIPITOR) 10 MG tablet, Take 1 tablet by mouth at bedtime, Disp: 90 tablet, Rfl: 1    gabapentin (NEURONTIN) 400 MG capsule, Take 1 capsule by mouth 3 times daily for 30 days. Intended supply: 30 days, Disp: 90 capsule, Rfl: 0    lamoTRIgine (LAMICTAL) 25 MG tablet, Take 4 tablets by mouth at bedtime, Disp: , Rfl:      Examination  Vitals:    05/20/24 1015   BP: 125/85   Site: Left Upper Arm   Position: Sitting   Cuff Size: Medium Adult   Pulse: (!) 105   SpO2: 97%   Weight: 81.6 kg (180 lb)   Height: 1.499 m (4' 11.02\")      Physical Exam  Constitutional:       General: She is not in acute distress.  HENT:      Mouth/Throat:      Mouth: Mucous membranes are moist.   Eyes:      General: No scleral icterus.     Pupils: Pupils are equal, round, and

## 2024-06-07 DIAGNOSIS — M54.16 LUMBAR BACK PAIN WITH RADICULOPATHY AFFECTING LEFT LOWER EXTREMITY: ICD-10-CM

## 2024-06-07 RX ORDER — GABAPENTIN 400 MG/1
400 CAPSULE ORAL 3 TIMES DAILY
Qty: 90 CAPSULE | Refills: 0 | Status: SHIPPED | OUTPATIENT
Start: 2024-06-07 | End: 2024-07-07

## 2024-06-07 NOTE — TELEPHONE ENCOUNTER
Future Appointments   Date Time Provider Department Center   8/2/2024  1:00 PM Anna Mcbride MD HEALTHY WT [facility]       Last appt 5/20/24

## 2024-06-18 ENCOUNTER — PATIENT MESSAGE (OUTPATIENT)
Dept: NEUROLOGY | Age: 44
End: 2024-06-18

## 2024-06-18 NOTE — TELEPHONE ENCOUNTER
From: Petrona Green  To: Dr. Beverly Fallon  Sent: 6/18/2024 12:15 PM EDT  Subject: Medical clearance for meds     My psychiatrist needs your medical clearance to put me back on my ADHD medication

## 2024-06-18 NOTE — TELEPHONE ENCOUNTER
Please fax medical clearance letter to [phone]  Dr. Pearl    Pt has appt tomorrow so would like that today if possbile

## 2024-07-08 DIAGNOSIS — M54.16 LUMBAR BACK PAIN WITH RADICULOPATHY AFFECTING LEFT LOWER EXTREMITY: ICD-10-CM

## 2024-07-08 RX ORDER — GABAPENTIN 400 MG/1
400 CAPSULE ORAL 3 TIMES DAILY
Qty: 90 CAPSULE | Refills: 0 | Status: SHIPPED | OUTPATIENT
Start: 2024-07-08 | End: 2024-08-07

## 2024-07-08 NOTE — TELEPHONE ENCOUNTER
Future Appointments   Date Time Provider Department Center   8/2/2024  1:00 PM Anna Mcbride MD HEALTHY WT [facility]   8/2/2024  3:00 PM Preston Molina, PhD [place] PSYCHOLOG [facility]       Last appt 5/20/24

## 2024-07-08 NOTE — TELEPHONE ENCOUNTER
Pt called requesting a refill of gabapentin (NEURONTIN) 400 MG capsule  Please send to [facility] in [place].

## 2024-07-16 ENCOUNTER — APPOINTMENT (OUTPATIENT)
Dept: CT IMAGING | Age: 44
End: 2024-07-16
Payer: COMMERCIAL

## 2024-07-16 ENCOUNTER — HOSPITAL ENCOUNTER (EMERGENCY)
Age: 44
Discharge: HOME | End: 2024-07-16
Attending: EMERGENCY MEDICINE
Payer: COMMERCIAL

## 2024-07-16 VITALS
RESPIRATION RATE: 17 BRPM | OXYGEN SATURATION: 98 % | BODY MASS INDEX: 35.56 KG/M2 | HEART RATE: 91 BPM | WEIGHT: 176.37 LBS | DIASTOLIC BLOOD PRESSURE: 81 MMHG | TEMPERATURE: 98.4 F | HEIGHT: 59 IN | SYSTOLIC BLOOD PRESSURE: 138 MMHG

## 2024-07-16 DIAGNOSIS — R10.9 FLANK PAIN: Primary | ICD-10-CM

## 2024-07-16 DIAGNOSIS — R33.9 URINARY RETENTION: ICD-10-CM

## 2024-07-16 LAB
ALBUMIN SERPL-MCNC: 4.3 G/DL (ref 3.4–5)
ALBUMIN/GLOB SERPL: 1.3 {RATIO} (ref 1.1–2.2)
ALP SERPL-CCNC: 117 U/L (ref 40–129)
ALT SERPL-CCNC: 11 U/L (ref 10–40)
ANION GAP SERPL CALCULATED.3IONS-SCNC: 12 MMOL/L (ref 3–16)
AST SERPL-CCNC: 12 U/L (ref 15–37)
BACTERIA URNS QL MICRO: ABNORMAL /HPF
BASOPHILS # BLD: 0.1 K/UL (ref 0–0.2)
BASOPHILS NFR BLD: 0.8 %
BILIRUB SERPL-MCNC: <0.2 MG/DL (ref 0–1)
BILIRUB UR QL STRIP.AUTO: NEGATIVE
BUN SERPL-MCNC: 11 MG/DL (ref 7–20)
CALCIUM SERPL-MCNC: 9.6 MG/DL (ref 8.3–10.6)
CHLORIDE SERPL-SCNC: 103 MMOL/L (ref 99–110)
CLARITY UR: CLEAR
CO2 SERPL-SCNC: 24 MMOL/L (ref 21–32)
COLOR UR: YELLOW
CREAT SERPL-MCNC: 0.8 MG/DL (ref 0.6–1.1)
DEPRECATED RDW RBC AUTO: 14.3 % (ref 12.4–15.4)
EOSINOPHIL # BLD: 0.2 K/UL (ref 0–0.6)
EOSINOPHIL NFR BLD: 1.7 %
EPI CELLS #/AREA URNS AUTO: 0 /HPF (ref 0–5)
GFR SERPLBLD CREATININE-BSD FMLA CKD-EPI: >90 ML/MIN/{1.73_M2}
GLUCOSE SERPL-MCNC: 152 MG/DL (ref 70–99)
GLUCOSE UR STRIP.AUTO-MCNC: NEGATIVE MG/DL
HCT VFR BLD AUTO: 43.5 % (ref 36–48)
HGB BLD-MCNC: 15.1 G/DL (ref 12–16)
HGB UR QL STRIP.AUTO: ABNORMAL
HYALINE CASTS #/AREA URNS AUTO: 0 /LPF (ref 0–8)
KETONES UR STRIP.AUTO-MCNC: NEGATIVE MG/DL
LEUKOCYTE ESTERASE UR QL STRIP.AUTO: NEGATIVE
LIPASE SERPL-CCNC: 18 U/L (ref 13–60)
LYMPHOCYTES # BLD: 2.3 K/UL (ref 1–5.1)
LYMPHOCYTES NFR BLD: 23.3 %
MCH RBC QN AUTO: 29.5 PG (ref 26–34)
MCHC RBC AUTO-ENTMCNC: 34.6 G/DL (ref 31–36)
MCV RBC AUTO: 85.3 FL (ref 80–100)
MONOCYTES # BLD: 0.5 K/UL (ref 0–1.3)
MONOCYTES NFR BLD: 5.6 %
NEUTROPHILS # BLD: 6.7 K/UL (ref 1.7–7.7)
NEUTROPHILS NFR BLD: 68.6 %
NITRITE UR QL STRIP.AUTO: NEGATIVE
PH UR STRIP.AUTO: 5.5 [PH] (ref 5–8)
PLATELET # BLD AUTO: 310 K/UL (ref 135–450)
PMV BLD AUTO: 7.8 FL (ref 5–10.5)
POTASSIUM SERPL-SCNC: 3.9 MMOL/L (ref 3.5–5.1)
PROT SERPL-MCNC: 7.7 G/DL (ref 6.4–8.2)
PROT UR STRIP.AUTO-MCNC: NEGATIVE MG/DL
RBC # BLD AUTO: 5.1 M/UL (ref 4–5.2)
RBC CLUMPS #/AREA URNS AUTO: 3 /HPF (ref 0–4)
SODIUM SERPL-SCNC: 139 MMOL/L (ref 136–145)
SP GR UR STRIP.AUTO: 1.02 (ref 1–1.03)
UA DIPSTICK W REFLEX MICRO PNL UR: YES
URN SPEC COLLECT METH UR: ABNORMAL
UROBILINOGEN UR STRIP-ACNC: 0.2 E.U./DL
WBC # BLD AUTO: 9.8 K/UL (ref 4–11)
WBC #/AREA URNS AUTO: 1 /HPF (ref 0–5)

## 2024-07-16 PROCEDURE — 6360000002 HC RX W HCPCS: Performed by: EMERGENCY MEDICINE

## 2024-07-16 PROCEDURE — 83690 ASSAY OF LIPASE: CPT

## 2024-07-16 PROCEDURE — 51798 US URINE CAPACITY MEASURE: CPT

## 2024-07-16 PROCEDURE — 6370000000 HC RX 637 (ALT 250 FOR IP): Performed by: EMERGENCY MEDICINE

## 2024-07-16 PROCEDURE — 96374 THER/PROPH/DIAG INJ IV PUSH: CPT

## 2024-07-16 PROCEDURE — 85025 COMPLETE CBC W/AUTO DIFF WBC: CPT

## 2024-07-16 PROCEDURE — 81001 URINALYSIS AUTO W/SCOPE: CPT

## 2024-07-16 PROCEDURE — 51702 INSERT TEMP BLADDER CATH: CPT

## 2024-07-16 PROCEDURE — 74176 CT ABD & PELVIS W/O CONTRAST: CPT

## 2024-07-16 PROCEDURE — 96376 TX/PRO/DX INJ SAME DRUG ADON: CPT

## 2024-07-16 PROCEDURE — 80053 COMPREHEN METABOLIC PANEL: CPT

## 2024-07-16 PROCEDURE — 99284 EMERGENCY DEPT VISIT MOD MDM: CPT

## 2024-07-16 RX ORDER — ONDANSETRON 2 MG/ML
4 INJECTION INTRAMUSCULAR; INTRAVENOUS ONCE
Status: COMPLETED | OUTPATIENT
Start: 2024-07-16 | End: 2024-07-16

## 2024-07-16 RX ORDER — OXYCODONE HYDROCHLORIDE AND ACETAMINOPHEN 5; 325 MG/1; MG/1
1 TABLET ORAL ONCE
Status: COMPLETED | OUTPATIENT
Start: 2024-07-16 | End: 2024-07-16

## 2024-07-16 RX ADMIN — ONDANSETRON 4 MG: 2 INJECTION INTRAMUSCULAR; INTRAVENOUS at 17:26

## 2024-07-16 RX ADMIN — ONDANSETRON 4 MG: 2 INJECTION INTRAMUSCULAR; INTRAVENOUS at 20:48

## 2024-07-16 RX ADMIN — OXYCODONE HYDROCHLORIDE AND ACETAMINOPHEN 1 TABLET: 5; 325 TABLET ORAL at 17:26

## 2024-07-16 ASSESSMENT — PAIN DESCRIPTION - DESCRIPTORS
DESCRIPTORS: ACHING
DESCRIPTORS: SHARP;DISCOMFORT

## 2024-07-16 ASSESSMENT — PAIN DESCRIPTION - LOCATION
LOCATION: FLANK
LOCATION: FLANK

## 2024-07-16 ASSESSMENT — PAIN DESCRIPTION - PAIN TYPE: TYPE: ACUTE PAIN

## 2024-07-16 ASSESSMENT — PAIN SCALES - GENERAL
PAINLEVEL_OUTOF10: 8
PAINLEVEL_OUTOF10: 8

## 2024-07-16 ASSESSMENT — PAIN DESCRIPTION - FREQUENCY: FREQUENCY: CONTINUOUS

## 2024-07-16 ASSESSMENT — PAIN DESCRIPTION - ORIENTATION
ORIENTATION: LEFT
ORIENTATION: LEFT

## 2024-07-16 NOTE — ED PROVIDER NOTES
[facility] EMERGENCY DEPARTMENT    CHIEF COMPLAINT  Flank Pain (Onset this morning after waking up around 0900; reports that pain is on the left side and she is having a hard time urinating; reports nausea and pain w/ laying on that side; denies any other urinary symptoms )       HISTORY OF PRESENT ILLNESS  Petrona Green is a 43 y.o. female who presents to the ED complaining of back pain. Symptoms started this morning. Left low back pain radiating anteriorly and to the groin. Sharp. Voided once when she woke without dysuria or hematuria, but hasn't urinated since. Feels distended in the suprapubic region. Complains of nausea but no emesis. Denies fever, chest pain, SOB, diarrhea. Denies vaginal bleeding or discharge. Denies weakness, numbness.     I have reviewed the following from the nursing documentation:    Past Medical History:   Diagnosis Date    ADHD (attention deficit hyperactivity disorder) 1988    Depression with anxiety     Difficult intubation     airway swelled up    Drug-seeking behavior     Encounter for imaging to screen for metal prior to MRI 06/01/2021    MRI Conditional [employer] Non-Programmable shunt model#56119 implanted 10/30/2020 at [facility]. Normal Mode. 1.5T or 3.0T.    ESBL (extended spectrum beta-lactamase) producing bacteria infection 11/06/2019    urine    Functional ovarian cysts 2008    rt ovary cyst x 2 yrs.    GERD (gastroesophageal reflux disease) When I was a kid    Headache(784.0)     migraines    History of blood transfusion     at birth    History of kidney stones     History of PCOS     had hysterectomy in 2016    Hydrocephalus (HCC)     Hyperlipidemia     Irritable bowel syndrome 2004    had colonoscopy about 6 yrs ago    Meningitis 07/2018    MRSA (methicillin resistant Staphylococcus aureus)     in  shunt after surgery    Neutrophilic leukocytosis     Nicotine dependence     Piercing     nose- pt wll remove for DOS    PONV (postoperative

## 2024-07-16 NOTE — ED TRIAGE NOTES
Patient arrived to the ED ambulatory from home w/ complaints of flank pain.     Patient/EMS reports states Onset this morning after waking up around 0900; reports that pain is on the left side and she is having a hard time urinating; reports nausea and pain w/ laying on that side; denies any other urinary symptoms     Patient A&O x 4, VSS,

## 2024-07-17 NOTE — DISCHARGE INSTRUCTIONS
Dear Petrona Green,     Thank you for the privilege of caring for you today in the Emergency Department.     Please return to the Emergency Department if you develop any new or worsening symptoms, fever, inability to urinate, or for any other concerns.     Regards,     Jah Walker MD, FACEP

## 2024-07-18 RX ORDER — ONDANSETRON 4 MG/1
4 TABLET, FILM COATED ORAL EVERY 8 HOURS PRN
Qty: 20 TABLET | Refills: 0 | Status: SHIPPED | OUTPATIENT
Start: 2024-07-18

## 2024-07-25 ENCOUNTER — OFFICE VISIT (OUTPATIENT)
Dept: INTERNAL MEDICINE CLINIC | Age: 44
End: 2024-07-25
Payer: COMMERCIAL

## 2024-07-25 VITALS
OXYGEN SATURATION: 95 % | HEIGHT: 59 IN | SYSTOLIC BLOOD PRESSURE: 124 MMHG | BODY MASS INDEX: 34.88 KG/M2 | DIASTOLIC BLOOD PRESSURE: 74 MMHG | HEART RATE: 91 BPM | WEIGHT: 173 LBS

## 2024-07-25 DIAGNOSIS — M54.16 LUMBAR BACK PAIN WITH RADICULOPATHY AFFECTING LEFT LOWER EXTREMITY: ICD-10-CM

## 2024-07-25 PROCEDURE — 3078F DIAST BP <80 MM HG: CPT | Performed by: NURSE PRACTITIONER

## 2024-07-25 PROCEDURE — 3074F SYST BP LT 130 MM HG: CPT | Performed by: NURSE PRACTITIONER

## 2024-07-25 PROCEDURE — G8427 DOCREV CUR MEDS BY ELIG CLIN: HCPCS | Performed by: NURSE PRACTITIONER

## 2024-07-25 PROCEDURE — G8417 CALC BMI ABV UP PARAM F/U: HCPCS | Performed by: NURSE PRACTITIONER

## 2024-07-25 PROCEDURE — G2211 COMPLEX E/M VISIT ADD ON: HCPCS | Performed by: NURSE PRACTITIONER

## 2024-07-25 PROCEDURE — 99213 OFFICE O/P EST LOW 20 MIN: CPT | Performed by: NURSE PRACTITIONER

## 2024-07-25 PROCEDURE — 4004F PT TOBACCO SCREEN RCVD TLK: CPT | Performed by: NURSE PRACTITIONER

## 2024-07-25 RX ORDER — GABAPENTIN 400 MG/1
CAPSULE ORAL
Qty: 120 CAPSULE | Refills: 0 | Status: SHIPPED | OUTPATIENT
Start: 2024-07-25 | End: 2024-07-25

## 2024-07-25 RX ORDER — GABAPENTIN 400 MG/1
CAPSULE ORAL
Qty: 120 CAPSULE | Refills: 0 | Status: SHIPPED | OUTPATIENT
Start: 2024-07-25 | End: 2024-08-24

## 2024-07-25 ASSESSMENT — ENCOUNTER SYMPTOMS
CONSTIPATION: 0
BACK PAIN: 1
SHORTNESS OF BREATH: 0

## 2024-07-25 NOTE — PATIENT INSTRUCTIONS
Increase gabapentin - take one tablet twice a day and two tablets nightly  Reschedule with Dr Beckman

## 2024-07-25 NOTE — PROGRESS NOTES
Date: 7/25/2024                                               Subjective/Objective:     Chief Complaint   Patient presents with    Lower Back Pain     Radiates to both sides.       HPI    Petrona Green is a 44 yo female, visit today for back pain. She has chronic back pain that has been worsening. She had appointment with Dr Beckman that she missed due to medical emergency with her mom. Gabapentin has been somewhat effective. She took two tablets (400 mg each) last night and this significantly relieved her pain.     MRI 10/2023:  IMPRESSION:  No fracture or subluxation in the lumbar spine.  No abnormal enhancement.  No significant disc herniation, spinal canal or foraminal stenosis.         Patient Active Problem List    Diagnosis Date Noted    Multiple drug resistant organism (MDRO) culture positive 02/04/2023    Renal stones 02/04/2023    Seizure-like activity (HCC) 10/27/2022    History of extended-spectrum beta-lactamase producing Escherichia coli infection 10/10/2022    History of creation of ventriculoperitoneal shunt 10/10/2022    Allergy to multiple antibiotics 10/10/2022    Abdominal wall cellulitis 10/09/2022    DM2 (diabetes mellitus, type 2) (HCC) 10/09/2022    HTN (hypertension) 10/09/2022    Cellulitis 10/09/2022    Abdominal pain in female 09/25/2022    Nausea and vomiting 09/25/2022    COVID 09/25/2022    Intermittent small bowel obstruction due to adhesions ([facility]) 09/14/2022    Abdominal wall mass 09/14/2022    Infection due to extended-spectrum beta-lactamase-producing Escherichia coli 09/01/2022    Kidney stone 09/01/2022    Cyst and pseudocyst of pancreas 10/13/2020    Abdominal wall fluid collections 03/18/2024    Smoking 03/18/2024    Lactic acid acidosis 03/18/2024    Morbid obesity due to excess calories ([facility]) 03/12/2024    Neutrophilia 03/12/2024    Postoperative nausea and vomiting 03/12/2024    Umbilical hernia without obstruction and without gangrene 03/11/2024    Postoperative fever  Finish out antibiotic. Patient was instructed to drink plenty of fluids, urinate frequently and to contact the office promptly should she notice fever greater than 102, increase in discomfort, skin rash, lack of improvement after 2 days of treatment, or the appearance of new symptoms.    Continue aspirin bid with food. Multivitamin with iron and good food intake. Recheck 1-2 months fasting labs. Hgb stable but not better.    Await labs    Back to PT and orthopedic surgeon

## 2024-08-15 DIAGNOSIS — M54.16 LUMBAR BACK PAIN WITH RADICULOPATHY AFFECTING LEFT LOWER EXTREMITY: ICD-10-CM

## 2024-08-15 RX ORDER — GABAPENTIN 400 MG/1
CAPSULE ORAL
Qty: 120 CAPSULE | Refills: 0 | OUTPATIENT
Start: 2024-08-15 | End: 2024-09-14

## 2024-08-15 NOTE — TELEPHONE ENCOUNTER
Future Appointments   Date Time Provider Department Center   9/11/2024  3:30 PM Preston Molina, PhD [place] PSYCHOLOG [facility]   9/18/2024 12:30 PM SCHEDULE, HEALTHY WEIGHT HEALTHY WT [facility]   9/26/2024  5:30 PM nAna Mcbride MD HEALTHY WT [facility]     Last appt 07/25/24

## 2024-08-21 DIAGNOSIS — M54.16 LUMBAR BACK PAIN WITH RADICULOPATHY AFFECTING LEFT LOWER EXTREMITY: ICD-10-CM

## 2024-08-21 NOTE — TELEPHONE ENCOUNTER
Future Appointments   Date Time Provider Department Center   9/11/2024  3:30 PM Preston Molina, PhD [place] [facility]   9/18/2024 12:30 PM SCHEDULE, HEALTHY WEIGHT HEALTHY WT [facility]   9/26/2024  5:30 PM Anna Mcbride MD HEALTHY WT [facility]     Last appt 07/25/24

## 2024-08-22 ENCOUNTER — TELEPHONE (OUTPATIENT)
Dept: INTERNAL MEDICINE CLINIC | Age: 44
End: 2024-08-22

## 2024-08-22 RX ORDER — GABAPENTIN 400 MG/1
CAPSULE ORAL
Qty: 120 CAPSULE | Refills: 2 | Status: SHIPPED | OUTPATIENT
Start: 2024-08-22 | End: 2024-09-21

## 2024-08-22 NOTE — TELEPHONE ENCOUNTER
Pt called, per her pharmacy she is not able to  her Gabapentin until Sunday, but she is leaving to go to [facility].    Can we call the pharmacy so she can pick it up early?    Thank you

## 2024-09-11 ENCOUNTER — TELEMEDICINE (OUTPATIENT)
Dept: PSYCHOLOGY | Age: 44
End: 2024-09-11

## 2024-09-11 DIAGNOSIS — F41.1 GAD (GENERALIZED ANXIETY DISORDER): Primary | ICD-10-CM

## 2024-09-11 PROCEDURE — 99213 OFFICE O/P EST LOW 20 MIN: CPT | Performed by: PSYCHOLOGIST

## 2024-09-20 DIAGNOSIS — M54.16 LUMBAR BACK PAIN WITH RADICULOPATHY AFFECTING LEFT LOWER EXTREMITY: ICD-10-CM

## 2024-09-20 RX ORDER — GABAPENTIN 400 MG/1
CAPSULE ORAL
Qty: 120 CAPSULE | Refills: 0 | Status: SHIPPED | OUTPATIENT
Start: 2024-09-20 | End: 2024-10-20

## 2024-09-26 ENCOUNTER — TELEPHONE (OUTPATIENT)
Dept: INTERNAL MEDICINE CLINIC | Age: 44
End: 2024-09-26

## 2024-10-21 DIAGNOSIS — M54.16 LUMBAR BACK PAIN WITH RADICULOPATHY AFFECTING LEFT LOWER EXTREMITY: ICD-10-CM

## 2024-10-22 RX ORDER — GABAPENTIN 400 MG/1
CAPSULE ORAL
Qty: 120 CAPSULE | Refills: 0 | Status: SHIPPED | OUTPATIENT
Start: 2024-10-22 | End: 2024-11-21

## 2024-10-28 ENCOUNTER — TELEPHONE (OUTPATIENT)
Dept: INTERNAL MEDICINE CLINIC | Age: 44
End: 2024-10-28

## 2024-10-28 NOTE — TELEPHONE ENCOUNTER
Pt called, woke up with the stomach bug this morning, Vomiting, diarrhea, nausea. Wants to know if something can be sent in for the nausea?    Please advise    [facility] [address]    Thank you

## 2024-10-29 DIAGNOSIS — R11.2 NAUSEA AND VOMITING, UNSPECIFIED VOMITING TYPE: Primary | ICD-10-CM

## 2024-10-29 RX ORDER — ONDANSETRON 4 MG/1
4 TABLET, FILM COATED ORAL 3 TIMES DAILY PRN
Qty: 15 TABLET | Refills: 0 | Status: SHIPPED | OUTPATIENT
Start: 2024-10-29

## 2024-11-21 ENCOUNTER — TELEPHONE (OUTPATIENT)
Dept: INTERNAL MEDICINE CLINIC | Age: 44
End: 2024-11-21

## 2024-11-22 NOTE — TELEPHONE ENCOUNTER
Come in for evaluation soon and can get lab work. Or if getting worse or remaining persistent go the ED or urgent care

## 2024-11-23 ENCOUNTER — APPOINTMENT (OUTPATIENT)
Dept: GENERAL RADIOLOGY | Age: 44
End: 2024-11-23
Payer: COMMERCIAL

## 2024-11-23 ENCOUNTER — APPOINTMENT (OUTPATIENT)
Dept: CT IMAGING | Age: 44
End: 2024-11-23
Payer: COMMERCIAL

## 2024-11-23 ENCOUNTER — HOSPITAL ENCOUNTER (EMERGENCY)
Age: 44
Discharge: HOME | End: 2024-11-23
Payer: COMMERCIAL

## 2024-11-23 VITALS
WEIGHT: 168.21 LBS | SYSTOLIC BLOOD PRESSURE: 122 MMHG | TEMPERATURE: 98.8 F | RESPIRATION RATE: 16 BRPM | DIASTOLIC BLOOD PRESSURE: 83 MMHG | HEART RATE: 90 BPM | OXYGEN SATURATION: 94 % | BODY MASS INDEX: 33.96 KG/M2

## 2024-11-23 DIAGNOSIS — L03.213 PRESEPTAL CELLULITIS OF RIGHT EYE: Primary | ICD-10-CM

## 2024-11-23 DIAGNOSIS — K59.00 CONSTIPATION, UNSPECIFIED CONSTIPATION TYPE: ICD-10-CM

## 2024-11-23 LAB
ALBUMIN SERPL-MCNC: 4 G/DL (ref 3.4–5)
ALBUMIN/GLOB SERPL: 1.2 {RATIO} (ref 1.1–2.2)
ALP SERPL-CCNC: 117 U/L (ref 40–129)
ALT SERPL-CCNC: 12 U/L (ref 10–40)
ANION GAP SERPL CALCULATED.3IONS-SCNC: 12 MMOL/L (ref 3–16)
AST SERPL-CCNC: 21 U/L (ref 15–37)
BACTERIA URNS QL MICRO: ABNORMAL /HPF
BASOPHILS # BLD: 0.1 K/UL (ref 0–0.2)
BASOPHILS NFR BLD: 0.5 %
BILIRUB SERPL-MCNC: 0.3 MG/DL (ref 0–1)
BILIRUB UR QL STRIP.AUTO: NEGATIVE
BUN SERPL-MCNC: 14 MG/DL (ref 7–20)
CALCIUM SERPL-MCNC: 9.6 MG/DL (ref 8.3–10.6)
CHLORIDE SERPL-SCNC: 101 MMOL/L (ref 99–110)
CLARITY UR: ABNORMAL
CO2 SERPL-SCNC: 23 MMOL/L (ref 21–32)
COLOR UR: YELLOW
CREAT SERPL-MCNC: 0.8 MG/DL (ref 0.6–1.1)
DEPRECATED RDW RBC AUTO: 14.3 % (ref 12.4–15.4)
EOSINOPHIL # BLD: 0.2 K/UL (ref 0–0.6)
EOSINOPHIL NFR BLD: 1.9 %
EPI CELLS #/AREA URNS AUTO: 12 /HPF (ref 0–5)
GFR SERPLBLD CREATININE-BSD FMLA CKD-EPI: >90 ML/MIN/{1.73_M2}
GLUCOSE SERPL-MCNC: 101 MG/DL (ref 70–99)
GLUCOSE UR STRIP.AUTO-MCNC: NEGATIVE MG/DL
HCT VFR BLD AUTO: 49 % (ref 36–48)
HGB BLD-MCNC: 16.4 G/DL (ref 12–16)
HGB UR QL STRIP.AUTO: NEGATIVE
HYALINE CASTS #/AREA URNS AUTO: 1 /LPF (ref 0–8)
KETONES UR STRIP.AUTO-MCNC: NEGATIVE MG/DL
LACTATE BLDV-SCNC: 1.1 MMOL/L (ref 0.4–2)
LEUKOCYTE ESTERASE UR QL STRIP.AUTO: NEGATIVE
LYMPHOCYTES # BLD: 3.3 K/UL (ref 1–5.1)
LYMPHOCYTES NFR BLD: 24.7 %
MCH RBC QN AUTO: 28.8 PG (ref 26–34)
MCHC RBC AUTO-ENTMCNC: 33.6 G/DL (ref 31–36)
MCV RBC AUTO: 85.8 FL (ref 80–100)
MONOCYTES # BLD: 0.6 K/UL (ref 0–1.3)
MONOCYTES NFR BLD: 4.7 %
NEUTROPHILS # BLD: 9 K/UL (ref 1.7–7.7)
NEUTROPHILS NFR BLD: 68.2 %
NITRITE UR QL STRIP.AUTO: NEGATIVE
PH UR STRIP.AUTO: 6.5 [PH] (ref 5–8)
PLATELET # BLD AUTO: 242 K/UL (ref 135–450)
PLATELET BLD QL SMEAR: ADEQUATE
PMV BLD AUTO: 7.9 FL (ref 5–10.5)
POTASSIUM SERPL-SCNC: 4.1 MMOL/L (ref 3.5–5.1)
PROT SERPL-MCNC: 7.3 G/DL (ref 6.4–8.2)
PROT UR STRIP.AUTO-MCNC: NEGATIVE MG/DL
RBC # BLD AUTO: 5.71 M/UL (ref 4–5.2)
RBC CLUMPS #/AREA URNS AUTO: 3 /HPF (ref 0–4)
RBC MORPH BLD: NORMAL
SLIDE REVIEW: ABNORMAL
SODIUM SERPL-SCNC: 136 MMOL/L (ref 136–145)
SP GR UR STRIP.AUTO: 1.02 (ref 1–1.03)
TROPONIN, HIGH SENSITIVITY: <6 NG/L (ref 0–14)
TSH SERPL DL<=0.005 MIU/L-ACNC: 1.85 UIU/ML (ref 0.27–4.2)
UA COMPLETE W REFLEX CULTURE PNL UR: ABNORMAL
UA DIPSTICK W REFLEX MICRO PNL UR: YES
URN SPEC COLLECT METH UR: ABNORMAL
UROBILINOGEN UR STRIP-ACNC: 0.2 E.U./DL
WBC # BLD AUTO: 13.2 K/UL (ref 4–11)
WBC #/AREA URNS AUTO: 5 /HPF (ref 0–5)

## 2024-11-23 PROCEDURE — 6370000000 HC RX 637 (ALT 250 FOR IP)

## 2024-11-23 PROCEDURE — 96374 THER/PROPH/DIAG INJ IV PUSH: CPT

## 2024-11-23 PROCEDURE — 6360000002 HC RX W HCPCS

## 2024-11-23 PROCEDURE — 80053 COMPREHEN METABOLIC PANEL: CPT

## 2024-11-23 PROCEDURE — 84443 ASSAY THYROID STIM HORMONE: CPT

## 2024-11-23 PROCEDURE — 6360000004 HC RX CONTRAST MEDICATION

## 2024-11-23 PROCEDURE — 99285 EMERGENCY DEPT VISIT HI MDM: CPT

## 2024-11-23 PROCEDURE — 85025 COMPLETE CBC W/AUTO DIFF WBC: CPT

## 2024-11-23 PROCEDURE — 84484 ASSAY OF TROPONIN QUANT: CPT

## 2024-11-23 PROCEDURE — 81001 URINALYSIS AUTO W/SCOPE: CPT

## 2024-11-23 PROCEDURE — 83605 ASSAY OF LACTIC ACID: CPT

## 2024-11-23 PROCEDURE — 70481 CT ORBIT/EAR/FOSSA W/DYE: CPT

## 2024-11-23 RX ORDER — PROMETHAZINE HYDROCHLORIDE 25 MG/1
25 TABLET ORAL EVERY 6 HOURS PRN
Qty: 20 TABLET | Refills: 0 | Status: SHIPPED | OUTPATIENT
Start: 2024-11-23 | End: 2024-11-30

## 2024-11-23 RX ORDER — SENNOSIDES A AND B 8.6 MG/1
1 TABLET, FILM COATED ORAL DAILY
Qty: 30 TABLET | Refills: 0 | Status: SHIPPED | OUTPATIENT
Start: 2024-11-23 | End: 2024-12-23

## 2024-11-23 RX ORDER — IOPAMIDOL 755 MG/ML
75 INJECTION, SOLUTION INTRAVASCULAR
Status: COMPLETED | OUTPATIENT
Start: 2024-11-23 | End: 2024-11-23

## 2024-11-23 RX ORDER — ONDANSETRON 2 MG/ML
4 INJECTION INTRAMUSCULAR; INTRAVENOUS ONCE
Status: COMPLETED | OUTPATIENT
Start: 2024-11-23 | End: 2024-11-23

## 2024-11-23 RX ORDER — CLINDAMYCIN HYDROCHLORIDE 300 MG/1
300 CAPSULE ORAL 3 TIMES DAILY
Qty: 21 CAPSULE | Refills: 0 | Status: SHIPPED | OUTPATIENT
Start: 2024-11-23 | End: 2024-11-30

## 2024-11-23 RX ORDER — TETRACAINE HYDROCHLORIDE 5 MG/ML
1 SOLUTION OPHTHALMIC ONCE
Status: COMPLETED | OUTPATIENT
Start: 2024-11-23 | End: 2024-11-23

## 2024-11-23 RX ADMIN — ONDANSETRON 4 MG: 2 INJECTION, SOLUTION INTRAMUSCULAR; INTRAVENOUS at 19:31

## 2024-11-23 RX ADMIN — FLUORESCEIN SODIUM 1 MG: 1 STRIP OPHTHALMIC at 17:57

## 2024-11-23 RX ADMIN — IOPAMIDOL 75 ML: 755 INJECTION, SOLUTION INTRAVENOUS at 20:02

## 2024-11-23 RX ADMIN — TETRACAINE HYDROCHLORIDE 1 DROP: 5 SOLUTION OPHTHALMIC at 17:57

## 2024-11-23 ASSESSMENT — VISUAL ACUITY
OU: 20/25
OS: 20/25
OD: 20/25

## 2024-11-23 ASSESSMENT — TONOMETRY
OS_IOP_MMHG: 17
OD_IOP_MMHG: 16

## 2024-11-23 ASSESSMENT — PAIN - FUNCTIONAL ASSESSMENT: PAIN_FUNCTIONAL_ASSESSMENT: NONE - DENIES PAIN

## 2024-11-23 NOTE — ED TRIAGE NOTES
Pt states that she has been experiencing right sided eye pain, swelling, and redness that started 2 days ago. Pt states that she has been experiencing lightheadedness, shakiness causing inability to write, forgetfulness (\"not making sense with words\") for the past week that has gotten worse today. Pt denies LOC. Pt denies fever/chills. Pt endorses HA that is worse in the morning. Pt AAO x 4. VSS.

## 2024-11-24 LAB
EKG ATRIAL RATE: 84 BPM
EKG DIAGNOSIS: NORMAL
EKG P AXIS: 57 DEGREES
EKG P-R INTERVAL: 148 MS
EKG Q-T INTERVAL: 408 MS
EKG QRS DURATION: 76 MS
EKG QTC CALCULATION (BAZETT): 482 MS
EKG R AXIS: 28 DEGREES
EKG T AXIS: 39 DEGREES
EKG VENTRICULAR RATE: 84 BPM

## 2024-11-27 ENCOUNTER — APPOINTMENT (OUTPATIENT)
Dept: GENERAL RADIOLOGY | Age: 44
End: 2024-11-27
Payer: COMMERCIAL

## 2024-11-27 ENCOUNTER — APPOINTMENT (OUTPATIENT)
Dept: CT IMAGING | Age: 44
End: 2024-11-27
Payer: COMMERCIAL

## 2024-11-27 ENCOUNTER — TELEPHONE (OUTPATIENT)
Dept: INTERNAL MEDICINE CLINIC | Age: 44
End: 2024-11-27

## 2024-11-27 ENCOUNTER — HOSPITAL ENCOUNTER (INPATIENT)
Age: 44
LOS: 3 days | Discharge: HOME | End: 2024-11-30
Attending: EMERGENCY MEDICINE
Payer: COMMERCIAL

## 2024-11-27 DIAGNOSIS — M54.2 NECK PAIN: ICD-10-CM

## 2024-11-27 DIAGNOSIS — R79.82 ELEVATED C-REACTIVE PROTEIN (CRP): ICD-10-CM

## 2024-11-27 DIAGNOSIS — Z71.89 GOALS OF CARE, COUNSELING/DISCUSSION: ICD-10-CM

## 2024-11-27 DIAGNOSIS — J18.9 ATYPICAL PNEUMONIA: ICD-10-CM

## 2024-11-27 DIAGNOSIS — D72.829 LEUKOCYTOSIS, UNSPECIFIED TYPE: ICD-10-CM

## 2024-11-27 DIAGNOSIS — L03.221 CELLULITIS OF NECK: Primary | ICD-10-CM

## 2024-11-27 DIAGNOSIS — R70.0 ELEVATED ERYTHROCYTE SEDIMENTATION RATE: ICD-10-CM

## 2024-11-27 LAB
ANION GAP SERPL CALCULATED.3IONS-SCNC: 14 MMOL/L (ref 3–16)
BASOPHILS # BLD: 0.1 K/UL (ref 0–0.2)
BASOPHILS NFR BLD: 0.8 %
BUN SERPL-MCNC: 13 MG/DL (ref 7–20)
CALCIUM SERPL-MCNC: 9.4 MG/DL (ref 8.3–10.6)
CHLORIDE SERPL-SCNC: 101 MMOL/L (ref 99–110)
CO2 SERPL-SCNC: 25 MMOL/L (ref 21–32)
CREAT SERPL-MCNC: 0.7 MG/DL (ref 0.6–1.1)
CRP SERPL-MCNC: 39.2 MG/L (ref 0–5.1)
DEPRECATED RDW RBC AUTO: 13.9 % (ref 12.4–15.4)
EOSINOPHIL # BLD: 0.3 K/UL (ref 0–0.6)
EOSINOPHIL NFR BLD: 2.2 %
ERYTHROCYTE [SEDIMENTATION RATE] IN BLOOD BY WESTERGREN METHOD: 35 MM/HR (ref 0–20)
GFR SERPLBLD CREATININE-BSD FMLA CKD-EPI: >90 ML/MIN/{1.73_M2}
GLUCOSE SERPL-MCNC: 108 MG/DL (ref 70–99)
HCT VFR BLD AUTO: 41.6 % (ref 36–48)
HGB BLD-MCNC: 14.4 G/DL (ref 12–16)
LYMPHOCYTES # BLD: 2.4 K/UL (ref 1–5.1)
LYMPHOCYTES NFR BLD: 21 %
MAGNESIUM SERPL-MCNC: 2.12 MG/DL (ref 1.8–2.4)
MCH RBC QN AUTO: 29.3 PG (ref 26–34)
MCHC RBC AUTO-ENTMCNC: 34.7 G/DL (ref 31–36)
MCV RBC AUTO: 84.5 FL (ref 80–100)
MONOCYTES # BLD: 0.8 K/UL (ref 0–1.3)
MONOCYTES NFR BLD: 6.6 %
NEUTROPHILS # BLD: 8.1 K/UL (ref 1.7–7.7)
NEUTROPHILS NFR BLD: 69.4 %
PLATELET # BLD AUTO: 284 K/UL (ref 135–450)
PMV BLD AUTO: 7.4 FL (ref 5–10.5)
POTASSIUM SERPL-SCNC: 3.5 MMOL/L (ref 3.5–5.1)
RBC # BLD AUTO: 4.92 M/UL (ref 4–5.2)
SODIUM SERPL-SCNC: 140 MMOL/L (ref 136–145)
WBC # BLD AUTO: 11.6 K/UL (ref 4–11)

## 2024-11-27 PROCEDURE — 80048 BASIC METABOLIC PNL TOTAL CA: CPT

## 2024-11-27 PROCEDURE — 2580000003 HC RX 258

## 2024-11-27 PROCEDURE — 1200000000 HC SEMI PRIVATE

## 2024-11-27 PROCEDURE — 70491 CT SOFT TISSUE NECK W/DYE: CPT

## 2024-11-27 PROCEDURE — 6360000002 HC RX W HCPCS: Performed by: EMERGENCY MEDICINE

## 2024-11-27 PROCEDURE — 2580000003 HC RX 258: Performed by: EMERGENCY MEDICINE

## 2024-11-27 PROCEDURE — 96374 THER/PROPH/DIAG INJ IV PUSH: CPT

## 2024-11-27 PROCEDURE — 86140 C-REACTIVE PROTEIN: CPT

## 2024-11-27 PROCEDURE — 6360000004 HC RX CONTRAST MEDICATION: Performed by: EMERGENCY MEDICINE

## 2024-11-27 PROCEDURE — 70450 CT HEAD/BRAIN W/O DYE: CPT

## 2024-11-27 PROCEDURE — 96375 TX/PRO/DX INJ NEW DRUG ADDON: CPT

## 2024-11-27 PROCEDURE — 70360 X-RAY EXAM OF NECK: CPT

## 2024-11-27 PROCEDURE — 99285 EMERGENCY DEPT VISIT HI MDM: CPT

## 2024-11-27 PROCEDURE — 85652 RBC SED RATE AUTOMATED: CPT

## 2024-11-27 PROCEDURE — 83735 ASSAY OF MAGNESIUM: CPT

## 2024-11-27 PROCEDURE — 85025 COMPLETE CBC W/AUTO DIFF WBC: CPT

## 2024-11-27 RX ORDER — POTASSIUM CHLORIDE 7.45 MG/ML
10 INJECTION INTRAVENOUS PRN
Status: DISCONTINUED | OUTPATIENT
Start: 2024-11-27 | End: 2024-11-30 | Stop reason: HOSPADM

## 2024-11-27 RX ORDER — ENOXAPARIN SODIUM 100 MG/ML
40 INJECTION SUBCUTANEOUS DAILY
Status: DISCONTINUED | OUTPATIENT
Start: 2024-11-28 | End: 2024-11-30 | Stop reason: HOSPADM

## 2024-11-27 RX ORDER — TRAMADOL HYDROCHLORIDE 50 MG/1
100 TABLET ORAL EVERY 6 HOURS PRN
Status: DISPENSED | OUTPATIENT
Start: 2024-11-27 | End: 2024-11-28

## 2024-11-27 RX ORDER — DEXTROAMPHETAMINE SACCHARATE, AMPHETAMINE ASPARTATE MONOHYDRATE, DEXTROAMPHETAMINE SULFATE AND AMPHETAMINE SULFATE 7.5; 7.5; 7.5; 7.5 MG/1; MG/1; MG/1; MG/1
30 CAPSULE, EXTENDED RELEASE ORAL EVERY MORNING
COMMUNITY

## 2024-11-27 RX ORDER — DIPHENHYDRAMINE HYDROCHLORIDE 50 MG/ML
25 INJECTION INTRAMUSCULAR; INTRAVENOUS ONCE
Status: COMPLETED | OUTPATIENT
Start: 2024-11-27 | End: 2024-11-27

## 2024-11-27 RX ORDER — DEXTROAMPHETAMINE SACCHARATE, AMPHETAMINE ASPARTATE MONOHYDRATE, DEXTROAMPHETAMINE SULFATE AND AMPHETAMINE SULFATE 2.5; 2.5; 2.5; 2.5 MG/1; MG/1; MG/1; MG/1
30 CAPSULE, EXTENDED RELEASE ORAL EVERY MORNING
Status: DISCONTINUED | OUTPATIENT
Start: 2024-11-28 | End: 2024-11-30 | Stop reason: HOSPADM

## 2024-11-27 RX ORDER — MAGNESIUM SULFATE IN WATER 40 MG/ML
2000 INJECTION, SOLUTION INTRAVENOUS PRN
Status: DISCONTINUED | OUTPATIENT
Start: 2024-11-27 | End: 2024-11-30 | Stop reason: HOSPADM

## 2024-11-27 RX ORDER — DIPHENHYDRAMINE HCL 25 MG
25 TABLET ORAL NIGHTLY PRN
COMMUNITY

## 2024-11-27 RX ORDER — LIDOCAINE 4 G/G
2 PATCH TOPICAL DAILY
Status: DISCONTINUED | OUTPATIENT
Start: 2024-11-28 | End: 2024-11-30 | Stop reason: HOSPADM

## 2024-11-27 RX ORDER — ONDANSETRON 2 MG/ML
8 INJECTION INTRAMUSCULAR; INTRAVENOUS ONCE
Status: COMPLETED | OUTPATIENT
Start: 2024-11-27 | End: 2024-11-27

## 2024-11-27 RX ORDER — SODIUM CHLORIDE 9 MG/ML
INJECTION, SOLUTION INTRAVENOUS CONTINUOUS
Status: ACTIVE | OUTPATIENT
Start: 2024-11-27 | End: 2024-11-28

## 2024-11-27 RX ORDER — KETOROLAC TROMETHAMINE 15 MG/ML
15 INJECTION, SOLUTION INTRAMUSCULAR; INTRAVENOUS ONCE
Status: COMPLETED | OUTPATIENT
Start: 2024-11-27 | End: 2024-11-27

## 2024-11-27 RX ORDER — SODIUM CHLORIDE 0.9 % (FLUSH) 0.9 %
5-40 SYRINGE (ML) INJECTION EVERY 12 HOURS SCHEDULED
Status: DISCONTINUED | OUTPATIENT
Start: 2024-11-27 | End: 2024-11-30 | Stop reason: HOSPADM

## 2024-11-27 RX ORDER — POLYETHYLENE GLYCOL 3350 17 G/17G
17 POWDER, FOR SOLUTION ORAL DAILY PRN
Status: DISCONTINUED | OUTPATIENT
Start: 2024-11-27 | End: 2024-11-30 | Stop reason: HOSPADM

## 2024-11-27 RX ORDER — SODIUM CHLORIDE 9 MG/ML
INJECTION, SOLUTION INTRAVENOUS PRN
Status: DISCONTINUED | OUTPATIENT
Start: 2024-11-27 | End: 2024-11-30 | Stop reason: HOSPADM

## 2024-11-27 RX ORDER — BUPRENORPHINE 8 MG/1
4 TABLET SUBLINGUAL 2 TIMES DAILY
COMMUNITY

## 2024-11-27 RX ORDER — ONDANSETRON 4 MG/1
4 TABLET, ORALLY DISINTEGRATING ORAL EVERY 8 HOURS PRN
Status: DISCONTINUED | OUTPATIENT
Start: 2024-11-27 | End: 2024-11-30 | Stop reason: HOSPADM

## 2024-11-27 RX ORDER — POTASSIUM CHLORIDE 1500 MG/1
40 TABLET, EXTENDED RELEASE ORAL PRN
Status: DISCONTINUED | OUTPATIENT
Start: 2024-11-27 | End: 2024-11-30 | Stop reason: HOSPADM

## 2024-11-27 RX ORDER — DEXTROAMPHETAMINE SACCHARATE, AMPHETAMINE ASPARTATE, DEXTROAMPHETAMINE SULFATE AND AMPHETAMINE SULFATE 7.5; 7.5; 7.5; 7.5 MG/1; MG/1; MG/1; MG/1
30 TABLET ORAL DAILY
COMMUNITY
End: 2024-11-27

## 2024-11-27 RX ORDER — ONDANSETRON 2 MG/ML
4 INJECTION INTRAMUSCULAR; INTRAVENOUS EVERY 6 HOURS PRN
Status: DISCONTINUED | OUTPATIENT
Start: 2024-11-27 | End: 2024-11-30 | Stop reason: HOSPADM

## 2024-11-27 RX ORDER — TRAMADOL HYDROCHLORIDE 50 MG/1
50 TABLET ORAL EVERY 6 HOURS PRN
Status: ACTIVE | OUTPATIENT
Start: 2024-11-27 | End: 2024-11-28

## 2024-11-27 RX ORDER — ATORVASTATIN CALCIUM 10 MG/1
10 TABLET, FILM COATED ORAL NIGHTLY
Status: DISCONTINUED | OUTPATIENT
Start: 2024-11-27 | End: 2024-11-30 | Stop reason: HOSPADM

## 2024-11-27 RX ORDER — SODIUM CHLORIDE 0.9 % (FLUSH) 0.9 %
5-40 SYRINGE (ML) INJECTION PRN
Status: DISCONTINUED | OUTPATIENT
Start: 2024-11-27 | End: 2024-11-30 | Stop reason: HOSPADM

## 2024-11-27 RX ORDER — DIPHENHYDRAMINE HCL 25 MG
12.5 TABLET ORAL ONCE
Status: COMPLETED | OUTPATIENT
Start: 2024-11-28 | End: 2024-11-28

## 2024-11-27 RX ORDER — IOPAMIDOL 755 MG/ML
75 INJECTION, SOLUTION INTRAVASCULAR
Status: COMPLETED | OUTPATIENT
Start: 2024-11-27 | End: 2024-11-27

## 2024-11-27 RX ORDER — HYDROXYZINE HYDROCHLORIDE 25 MG/1
25 TABLET, FILM COATED ORAL 3 TIMES DAILY PRN
COMMUNITY

## 2024-11-27 RX ADMIN — CEFEPIME 2000 MG: 2 INJECTION, POWDER, FOR SOLUTION INTRAVENOUS at 21:58

## 2024-11-27 RX ADMIN — SODIUM CHLORIDE: 9 INJECTION, SOLUTION INTRAVENOUS at 23:57

## 2024-11-27 RX ADMIN — ONDANSETRON 8 MG: 2 INJECTION INTRAMUSCULAR; INTRAVENOUS at 21:17

## 2024-11-27 RX ADMIN — IOPAMIDOL 75 ML: 755 INJECTION, SOLUTION INTRAVENOUS at 18:30

## 2024-11-27 RX ADMIN — Medication 25 MG: at 21:17

## 2024-11-27 RX ADMIN — DIPHENHYDRAMINE HYDROCHLORIDE 25 MG: 50 INJECTION INTRAMUSCULAR; INTRAVENOUS at 18:24

## 2024-11-27 RX ADMIN — KETOROLAC TROMETHAMINE 15 MG: 15 INJECTION, SOLUTION INTRAMUSCULAR; INTRAVENOUS at 18:25

## 2024-11-27 ASSESSMENT — PAIN DESCRIPTION - DESCRIPTORS
DESCRIPTORS: BURNING
DESCRIPTORS: ACHING

## 2024-11-27 ASSESSMENT — PAIN DESCRIPTION - LOCATION
LOCATION: NECK
LOCATION: NECK

## 2024-11-27 ASSESSMENT — PAIN SCALES - GENERAL
PAINLEVEL_OUTOF10: 8
PAINLEVEL_OUTOF10: 8

## 2024-11-27 ASSESSMENT — PAIN DESCRIPTION - PAIN TYPE: TYPE: ACUTE PAIN

## 2024-11-27 NOTE — ED PROVIDER NOTES
[facility] EMERGENCY DEPARTMENT  EMERGENCY DEPARTMENT ENCOUNTER        Pt Name: Petrona Green  MRN: [id number]  Birthdate 1980  Date of evaluation: 11/27/2024  Provider: Shayna Jha MD  PCP: Linda Fox APRN  Note Started: 6:01 PM EST 11/27/24    CHIEF COMPLAINT     My neck  HISTORY OF PRESENT ILLNESS: 1 or more Elements     Chief Complaint   Patient presents with    Neck Pain     Neck pain hx. Bump on back of neck. Pt states it is growing. Hx of  shunt.      History from : Patient  Limitations to history : None    Petrona Green is a 44 y.o. female who presents to the emergency department secondary to concern for neck pain.  She states that she has a history of a  shunt but has not been able to see a neurosurgeon since she moved back to [place].  She reports that she gets infections very easily and she is worried she has another infection.  She states she woke up this morning and her neck was sore kind of all over but has progressed to more soreness on the left side.  She states it is not stiff.  She states that it hurts to touch.  She has tried some Tylenol and Excedrin at home without significant improvement.  Rates the pain currently as an 8 out of 10.  She states that she had a fever yesterday of 100.1, she did not feel well overall, no fevers today.  She does still smoke half pack per day.  She reports she is not on any blood thinners.  She states she has had no known trauma or falls.  She does report however that her roommate has told her this past week she has been \"wandering around the house\" but she remembers this but states that sometimes she feels like she says things that do not make any sense.  Denies any new neurologic symptoms.  She has no issues with her gait.    Of note, I returned to the room to discuss her recent diagnosis of preseptal cellulitis and the antibiotic she was prescribed.  She has not taken any of them because she has been vomiting despite  [place] [employer] - Occupational Stress Questionnaire     Feeling of Stress : Very much   Social Connections: Socially Isolated (8/25/2022)    Received from [facility], [facility]    Social Connection and Isolation Panel [NHANES]     Frequency of Communication with Friends and Family: More than three times a week     Frequency of Social Gatherings with Friends and Family: More than three times a week     Attends [employer] Services: Never     Active Member of Clubs or Organizations: No     Attends Club or Organization Meetings: Never     Marital Status:     Received from [facility] and [employer], [facility] and [employer]    Interpersonal Safety   Housing Stability: Low Risk  (3/17/2024)    Housing Stability Vital Sign     Unable to Pay for Housing in the Last Year: No     Number of Places Lived in the Last Year: 1     Unstable Housing in the Last Year: No     SCREENINGS            Aden Coma Scale  Eye Opening: Spontaneous  Best Verbal Response: Oriented  Best Motor Response: Obeys commands  [place] Coma Scale Score: 15                                CIWA Assessment  BP: (!) 145/81  Pulse: 95              PHYSICAL EXAM  1 or more Elements   INITIAL VITALS: BP: (!) 145/81, Temp: 97.6 °F (36.4 °C), Pulse: 95, Respirations: 16, SpO2: 95 % Height: 149.9 cm (4' 11\")   Wt Readings from Last 3 Encounters:   11/27/24 74.7 kg (164 lb 10.9 oz)   11/23/24 76.3 kg (168 lb 3.4 oz)   07/25/24 78.5 kg (173 lb)     Physical Exam  Vitals and nursing note reviewed.   Constitutional:       General: She is not in acute distress.     Appearance: She is not ill-appearing, toxic-appearing or diaphoretic.   HENT:      Head: Normocephalic and atraumatic.      Right Ear: External ear normal.      Left Ear: External ear normal.      Nose: Nose normal. No congestion or rhinorrhea.      Mouth/Throat:      Mouth: Mucous membranes are moist.   Eyes:      General: No visual

## 2024-11-27 NOTE — TELEPHONE ENCOUNTER
Pt called, she is confused and out of it lately, has been going on for at least a week. She gets up every night around 3am and talks and says things that are not normal. She remembers being awake ,but not what she is saying when these episodes are happening.    She was seen in the ER 11/23/24    Please advise    Thank you

## 2024-11-28 LAB
ANION GAP SERPL CALCULATED.3IONS-SCNC: 12 MMOL/L (ref 3–16)
BASOPHILS # BLD: 0.1 K/UL (ref 0–0.2)
BASOPHILS NFR BLD: 0.8 %
BILIRUB UR QL STRIP.AUTO: NEGATIVE
BUN SERPL-MCNC: 12 MG/DL (ref 7–20)
CALCIUM SERPL-MCNC: 8.9 MG/DL (ref 8.3–10.6)
CHLORIDE SERPL-SCNC: 103 MMOL/L (ref 99–110)
CLARITY UR: CLEAR
CO2 SERPL-SCNC: 24 MMOL/L (ref 21–32)
COLOR UR: YELLOW
CREAT SERPL-MCNC: 0.8 MG/DL (ref 0.6–1.1)
DEPRECATED RDW RBC AUTO: 13.8 % (ref 12.4–15.4)
EOSINOPHIL # BLD: 0.3 K/UL (ref 0–0.6)
EOSINOPHIL NFR BLD: 2.8 %
GFR SERPLBLD CREATININE-BSD FMLA CKD-EPI: >90 ML/MIN/{1.73_M2}
GLUCOSE SERPL-MCNC: 134 MG/DL (ref 70–99)
GLUCOSE UR STRIP.AUTO-MCNC: NEGATIVE MG/DL
HCT VFR BLD AUTO: 40.6 % (ref 36–48)
HGB BLD-MCNC: 14.2 G/DL (ref 12–16)
HGB UR QL STRIP.AUTO: NEGATIVE
KETONES UR STRIP.AUTO-MCNC: NEGATIVE MG/DL
LEUKOCYTE ESTERASE UR QL STRIP.AUTO: NEGATIVE
LYMPHOCYTES # BLD: 2 K/UL (ref 1–5.1)
LYMPHOCYTES NFR BLD: 21.3 %
MCH RBC QN AUTO: 29.4 PG (ref 26–34)
MCHC RBC AUTO-ENTMCNC: 35 G/DL (ref 31–36)
MCV RBC AUTO: 84 FL (ref 80–100)
MONOCYTES # BLD: 0.5 K/UL (ref 0–1.3)
MONOCYTES NFR BLD: 5.6 %
NEUTROPHILS # BLD: 6.6 K/UL (ref 1.7–7.7)
NEUTROPHILS NFR BLD: 69.5 %
NITRITE UR QL STRIP.AUTO: NEGATIVE
PH UR STRIP.AUTO: 5.5 [PH] (ref 5–8)
PHOSPHATE SERPL-MCNC: 3.9 MG/DL (ref 2.5–4.9)
PLATELET # BLD AUTO: 276 K/UL (ref 135–450)
PMV BLD AUTO: 7.9 FL (ref 5–10.5)
POTASSIUM SERPL-SCNC: 3.7 MMOL/L (ref 3.5–5.1)
PROT UR STRIP.AUTO-MCNC: NEGATIVE MG/DL
RBC # BLD AUTO: 4.83 M/UL (ref 4–5.2)
REPORT: NORMAL
RESP PATH DNA+RNA PNL NPH NAA+NON-PROBE: NORMAL
SODIUM SERPL-SCNC: 139 MMOL/L (ref 136–145)
SP GR UR STRIP.AUTO: 1.03 (ref 1–1.03)
UA COMPLETE W REFLEX CULTURE PNL UR: NORMAL
UA DIPSTICK W REFLEX MICRO PNL UR: NORMAL
URN SPEC COLLECT METH UR: NORMAL
UROBILINOGEN UR STRIP-ACNC: 0.2 E.U./DL
WBC # BLD AUTO: 9.5 K/UL (ref 4–11)

## 2024-11-28 PROCEDURE — 84100 ASSAY OF PHOSPHORUS: CPT

## 2024-11-28 PROCEDURE — 6370000000 HC RX 637 (ALT 250 FOR IP): Performed by: REGISTERED NURSE

## 2024-11-28 PROCEDURE — 81003 URINALYSIS AUTO W/O SCOPE: CPT

## 2024-11-28 PROCEDURE — 85025 COMPLETE CBC W/AUTO DIFF WBC: CPT

## 2024-11-28 PROCEDURE — 36415 COLL VENOUS BLD VENIPUNCTURE: CPT

## 2024-11-28 PROCEDURE — 2580000003 HC RX 258

## 2024-11-28 PROCEDURE — 1200000000 HC SEMI PRIVATE

## 2024-11-28 PROCEDURE — 6360000002 HC RX W HCPCS

## 2024-11-28 PROCEDURE — 6370000000 HC RX 637 (ALT 250 FOR IP)

## 2024-11-28 PROCEDURE — 94760 N-INVAS EAR/PLS OXIMETRY 1: CPT

## 2024-11-28 PROCEDURE — 0202U NFCT DS 22 TRGT SARS-COV-2: CPT

## 2024-11-28 PROCEDURE — 87641 MR-STAPH DNA AMP PROBE: CPT

## 2024-11-28 PROCEDURE — 80048 BASIC METABOLIC PNL TOTAL CA: CPT

## 2024-11-28 RX ORDER — PROMETHAZINE HYDROCHLORIDE 25 MG/1
25 TABLET ORAL EVERY 6 HOURS PRN
Status: DISCONTINUED | OUTPATIENT
Start: 2024-11-28 | End: 2024-11-30 | Stop reason: HOSPADM

## 2024-11-28 RX ORDER — KETOROLAC TROMETHAMINE 30 MG/ML
30 INJECTION, SOLUTION INTRAMUSCULAR; INTRAVENOUS EVERY 6 HOURS PRN
Status: DISCONTINUED | OUTPATIENT
Start: 2024-11-28 | End: 2024-11-30 | Stop reason: HOSPADM

## 2024-11-28 RX ORDER — HYDROXYZINE HYDROCHLORIDE 50 MG/ML
50 INJECTION, SOLUTION INTRAMUSCULAR EVERY 6 HOURS PRN
Status: DISCONTINUED | OUTPATIENT
Start: 2024-11-28 | End: 2024-11-28

## 2024-11-28 RX ORDER — LINEZOLID 2 MG/ML
600 INJECTION, SOLUTION INTRAVENOUS EVERY 12 HOURS
Status: DISCONTINUED | OUTPATIENT
Start: 2024-11-28 | End: 2024-11-29

## 2024-11-28 RX ORDER — CYCLOBENZAPRINE HCL 10 MG
10 TABLET ORAL 3 TIMES DAILY PRN
Status: DISCONTINUED | OUTPATIENT
Start: 2024-11-28 | End: 2024-11-30 | Stop reason: HOSPADM

## 2024-11-28 RX ORDER — GABAPENTIN 400 MG/1
400 CAPSULE ORAL 2 TIMES DAILY
Status: DISCONTINUED | OUTPATIENT
Start: 2024-11-28 | End: 2024-11-29

## 2024-11-28 RX ORDER — DIPHENHYDRAMINE HYDROCHLORIDE 50 MG/ML
25 INJECTION INTRAMUSCULAR; INTRAVENOUS EVERY 6 HOURS PRN
Status: DISCONTINUED | OUTPATIENT
Start: 2024-11-28 | End: 2024-11-30 | Stop reason: HOSPADM

## 2024-11-28 RX ADMIN — TRAMADOL HYDROCHLORIDE 100 MG: 50 TABLET, COATED ORAL at 13:49

## 2024-11-28 RX ADMIN — ATORVASTATIN CALCIUM 10 MG: 10 TABLET, FILM COATED ORAL at 00:04

## 2024-11-28 RX ADMIN — TRAMADOL HYDROCHLORIDE 100 MG: 50 TABLET, COATED ORAL at 00:04

## 2024-11-28 RX ADMIN — LINEZOLID 600 MG: 600 INJECTION, SOLUTION INTRAVENOUS at 06:50

## 2024-11-28 RX ADMIN — KETOROLAC TROMETHAMINE 30 MG: 30 INJECTION, SOLUTION INTRAMUSCULAR at 20:00

## 2024-11-28 RX ADMIN — TRAMADOL HYDROCHLORIDE 100 MG: 50 TABLET, COATED ORAL at 06:48

## 2024-11-28 RX ADMIN — CYCLOBENZAPRINE 10 MG: 10 TABLET, FILM COATED ORAL at 10:21

## 2024-11-28 RX ADMIN — ONDANSETRON 4 MG: 2 INJECTION INTRAMUSCULAR; INTRAVENOUS at 03:25

## 2024-11-28 RX ADMIN — CEFEPIME 2000 MG: 2 INJECTION, POWDER, FOR SOLUTION INTRAVENOUS at 11:49

## 2024-11-28 RX ADMIN — ATORVASTATIN CALCIUM 10 MG: 10 TABLET, FILM COATED ORAL at 20:00

## 2024-11-28 RX ADMIN — ENOXAPARIN SODIUM 40 MG: 100 INJECTION SUBCUTANEOUS at 10:15

## 2024-11-28 RX ADMIN — LINEZOLID 600 MG: 600 INJECTION, SOLUTION INTRAVENOUS at 18:24

## 2024-11-28 RX ADMIN — GABAPENTIN 400 MG: 400 CAPSULE ORAL at 20:00

## 2024-11-28 RX ADMIN — DIPHENHYDRAMINE HCL 12.5 MG: 25 TABLET ORAL at 00:04

## 2024-11-28 RX ADMIN — GABAPENTIN 400 MG: 400 CAPSULE ORAL at 10:15

## 2024-11-28 ASSESSMENT — PAIN DESCRIPTION - PAIN TYPE: TYPE: ACUTE PAIN

## 2024-11-28 ASSESSMENT — PAIN DESCRIPTION - DESCRIPTORS
DESCRIPTORS: SPASM
DESCRIPTORS: ACHING
DESCRIPTORS: THROBBING
DESCRIPTORS: BURNING

## 2024-11-28 ASSESSMENT — PAIN DESCRIPTION - LOCATION
LOCATION: NECK
LOCATION: BACK
LOCATION: NECK
LOCATION: NECK

## 2024-11-28 ASSESSMENT — PAIN DESCRIPTION - DIRECTION: RADIATING_TOWARDS: L BACK

## 2024-11-28 ASSESSMENT — PAIN DESCRIPTION - ORIENTATION
ORIENTATION: POSTERIOR
ORIENTATION: POSTERIOR
ORIENTATION: MID
ORIENTATION: LEFT

## 2024-11-28 ASSESSMENT — PAIN SCALES - GENERAL
PAINLEVEL_OUTOF10: 3
PAINLEVEL_OUTOF10: 9
PAINLEVEL_OUTOF10: 9
PAINLEVEL_OUTOF10: 10
PAINLEVEL_OUTOF10: 9
PAINLEVEL_OUTOF10: 10

## 2024-11-28 ASSESSMENT — PAIN DESCRIPTION - FREQUENCY: FREQUENCY: CONTINUOUS

## 2024-11-28 ASSESSMENT — PAIN - FUNCTIONAL ASSESSMENT
PAIN_FUNCTIONAL_ASSESSMENT: ACTIVITIES ARE NOT PREVENTED
PAIN_FUNCTIONAL_ASSESSMENT: PREVENTS OR INTERFERES WITH MANY ACTIVE NOT PASSIVE ACTIVITIES

## 2024-11-28 ASSESSMENT — PAIN DESCRIPTION - ONSET: ONSET: ON-GOING

## 2024-11-28 ASSESSMENT — PAIN SCALES - WONG BAKER: WONGBAKER_NUMERICALRESPONSE: NO HURT

## 2024-11-28 NOTE — PLAN OF CARE
Problem: Chronic Conditions and Co-morbidities  Goal: Patient's chronic conditions and co-morbidity symptoms are monitored and maintained or improved  Outcome: Progressing  Flowsheets (Taken 11/28/2024 0238)  Care Plan - Patient's Chronic Conditions and Co-Morbidity Symptoms are Monitored and Maintained or Improved:   Monitor and assess patient's chronic conditions and comorbid symptoms for stability, deterioration, or improvement   Collaborate with multidisciplinary team to address chronic and comorbid conditions and prevent exacerbation or deterioration   Update acute care plan with appropriate goals if chronic or comorbid symptoms are exacerbated and prevent overall improvement and discharge     Problem: Discharge Planning  Goal: Discharge to home or other facility with appropriate resources  Outcome: Progressing  Flowsheets (Taken 11/28/2024 0238)  Discharge to home or other facility with appropriate resources:   Identify barriers to discharge with patient and caregiver   Arrange for needed discharge resources and transportation as appropriate   Identify discharge learning needs (meds, wound care, etc)   Arrange for interpreters to assist at discharge as needed     Problem: Pain  Goal: Verbalizes/displays adequate comfort level or baseline comfort level  Outcome: Progressing  Flowsheets (Taken 11/28/2024 0238)  Verbalizes/displays adequate comfort level or baseline comfort level:   Encourage patient to monitor pain and request assistance   Assess pain using appropriate pain scale   Administer analgesics based on type and severity of pain and evaluate response   Implement non-pharmacological measures as appropriate and evaluate response     Problem: Risk for Elopement  Goal: Patient will not exit the unit/facility without proper excort  Outcome: Progressing  Flowsheets (Taken 11/27/2024 5522)  Nursing Interventions for Elopement Risk:   Assist with personal care needs such as toileting, eating, dressing, as

## 2024-11-28 NOTE — PROGRESS NOTES
Pt resting quietly in bed, resp easy and unlabored. IV unable to be flushed UG IV requested. No s/s of acute distress noted at this time. Appears comfortable. Call light is within reach. Plan of care cont, no questions or concerns at this time.

## 2024-11-28 NOTE — PROGRESS NOTES
Medication Reconciliation    List of medications patient is currently taking is complete.     Source of information: 1. Conversation with patient at bedside                                      2. EPIC records      Allergies  Bee venom, Bentyl [dicyclomine hcl], Dicyclomine, Ketorolac tromethamine, Levofloxacin, Maitake, Shiitake mushroom, Sulfa antibiotics, Zosyn [piperacillin sod-tazobactam so], Adhesive tape, Sulfacetamide, Acetaminophen, Butalbital-apap-caff-cod, Ceftaroline, Daptomycin, Haldol [haloperidol], Keppra [levetiracetam], Ketamine, Methocarbamol, Prochlorperazine, Reglan [metoclopramide], and Silicone     Notes regarding home medications:   1. Patient did not receive all of her home medications prior to arrival to the emergency department.    2. Patient states that the meds she did take this morning, she threw up roughly 30 minutes after administration.    3. Patient was prescribed clindamycin 300mg capsules, taking 1 C TID for 7 days, but the patient took one capsule on 11/25/24 and threw up and has not continued therapy.     4. Patient's dispense report shows buprenorphine along with buprenorphine/naloxone SL tabs, however she states she does not take the combo tablet as it causes her to throw up    Ayaka Phan, Pharmacy Intern  11/27/2024 10:38 PM      Addendum     Gabapentin was refilled on 11-19-24 #120   Petrona was switched to Buprenorphine only product on 11/25/24 due to N/V with Buprenorphine/Natrexone dose.     Alis Couch, [facility]

## 2024-11-28 NOTE — PROGRESS NOTES
Hospitalist Progress Note      PCP: Linda Fox APRN    Date of Admission: 11/27/2024    Chief Complaint: Nausea, vomiting, fever, chills, dizziness    Hospital Course: 44-year-old female with past medical history of hydrocephalus s/p  shunt, nonepileptic seizures, anxiety, ADHD who presented to the ED with multiple complaints including lightheadedness, dizziness, constipation, nausea, vomiting, fevers, eye pain, eye redness and swelling. While in the ED, she met sepsis criteria due to tachycardia of a heart rate greater than 90, leukocytosis.  CT soft tissue of the neck was obtained which suggested posterior left neck cellulitis with no abscess or fluid collection identified.  She was empirically started on IV vancomycin/cefepime and admitted for further workup/treatment.  Antibiotics adjusted to linezolid and cefepime due to patient's inability to tolerate vancomycin.    Subjective: Patient seen sitting in bed, states is having a lot of pain in the back of her neck extending down into her trap.  Also reports some burning sensation over the skin in that area.  She does endorse some lightheadedness, does appear pretty anxious.  She endorses some nausea, states that Zofran is not helping.  Discussed attempting different antiemetic and she was agreeable    Assessment/Plan:  Sepsis, POA  Cellulitis of left neck  -Criteria met, heart rate greater than 90, leukocytosis 11.6  -CT soft tissue of the neck with contrast showed mild subcutaneous soft tissue stranding within the soft tissue of the posterior left neck suggesting cellulitis, no abscess or fluid collection identified  -Started empirically on IV vancomycin and cefepime, patient cannot tolerate secondary \"red man\" syndrome.  Antibiotics adjusted to linezolid and cefepime  -Remains afebrile, leukocytosis resolved, continue to trend WBC  -Continue p.o. pain control with tramadol    History of hydrocephalus s/p  shunt  -X-ray shunt series showed no

## 2024-11-28 NOTE — PROGRESS NOTES
Clinical Pharmacy Note  Vancomycin Consult    Pharmacy consult received for one-time dose of vancomycin in the Emergency Department per Dr. Abhijeet MD.    Ht Readings from Last 1 Encounters:   11/27/24 1.499 m (4' 11\")        Wt Readings from Last 1 Encounters:   11/27/24 74.7 kg (164 lb 10.9 oz)         Assessment/Plan:  Vancomycin 1,500 mg IVPB x 1 in ED.  If vancomycin is to continue on admission and pharmacy is to manage dosing, please re-consult with admission orders.    Koko Triplett [facility], PharmD, BCPS  11/27/2024 9:18 PM

## 2024-11-28 NOTE — PROGRESS NOTES
PT AAO x4. IVF infusing. Pt tolerating diet with ongoing nausea. PRN medication given. Somewhat effective. Pt c/o of ongoing pain to left side f back. DUANE Murphy notified with NO for ultram. Somewhat effective. Pt requesting morphine. NP and MD Nye notified. NNO at this time. Pt refusing Vancomycin without IV benadryl d/t history of reaction and Raymonds. Pharm and MD Nye Pt updated on POC and refusing Im injections/ ABX and Toradol d/t not being effective. MD PERRY Avilez notified. NNO at his time. Pt resting fair. No further needs voiced at this time. Fall precautions in place. Bed alarm on. Call light within reach. Will continue care. Electronically signed by Suyapa Curry RN on 11/28/2024 at 6:28 AM

## 2024-11-28 NOTE — H&P
Xarelto, [] Coumadin   Code Status Prior Full Code   Surrogate Decision Maker/ POA          History from:     patient    History of Present Illness:     Chief Complaint:   Petrona Green is a 44 y.o. female with pmh of childhood hydrocephalus status post  shunt, chronic pain uses gabapentin who presents with left neck pain.  Patient was seen in the room awake alert oriented to person place and situation.  Patient stated woke up today morning and she had left side neck soreness which got worse through the day.  She states is constant stabbing sharp pain.  Radiating down to her left arm and her back.  She rates her pain 8 out of 10.  She did take Tylenol and Excedrin without any improvement.  She also stated yesterday had a fever 100.2 and felt very sick.  Also has nausea been taking antiemetic without any help.  Denies, headache or dizziness.  She stated has a childhood history of hydrocephalus  shunt in place which was done in [place] however she has not been able to establish care with neurosurgeon since she moved back to [place].  She did reach out to her primary care physician who recommended to come to ED for further evaluation.  Patient continues to smoke half a pack a day.  Denies any alcohol or drug abuse.  Review of Systems:        Pertinent positives and negatives discussed in HPI     Objective:   No intake or output data in the 24 hours ending 11/27/24 2132   Vitals:   Vitals:    11/27/24 1746   BP: (!) 145/81   Pulse: 95   Resp: 16   Temp: 97.6 °F (36.4 °C)   TempSrc: Oral   SpO2: 95%   Weight: 74.7 kg (164 lb 10.9 oz)   Height: 1.499 m (4' 11\")       Personally Reviewed Medications Prior to Admission     Prior to Admission medications    Medication Sig Start Date End Date Taking? Authorizing Provider   amphetamine-dextroamphetamine (ADDERALL XR) 30 MG extended release capsule Take 1 capsule by mouth every morning. Max Daily Amount: 30 mg   Yes Provider, MD Aundrea   clindamycin (CLEOCIN) 300 MG  emergency medical condition->Emergency Medical Condition (MA) Reason for Exam: posterior left sided neck swelling/abscess concern, hx of shunt/EVD, tender to palpation, also ro.... Additional signs and symptoms: pain and reddness back of left upper neck into hairline. FINDINGS: BRAIN/VENTRICLES: There is no acute intracranial hemorrhage, mass effect or midline shift.  No abnormal extra-axial fluid collection.  The gray-white differentiation is maintained without evidence of an acute infarct.  There is no evidence of hydrocephalus. Left frontal CSF shunt catheter is identified with the tip in the left lateral ventricle.  Right frontal craniotomy is noted. ORBITS: The visualized portion of the orbits demonstrate no acute abnormality. SINUSES: The visualized paranasal sinuses and mastoid air cells demonstrate no acute abnormality. SOFT TISSUES/SKULL:  No acute abnormality of the visualized skull or soft tissues.     No acute intracranial abnormality. Stable left frontal CSF shunt catheter.     XR SHUNT SERIES PLACEMENT (<4 VIEWS)    Result Date: 11/27/2024  EXAMINATION: SHUNT SERIES 11/27/2024 6:23 pm COMPARISON: CT head dated 11/03/2023 HISTORY: ORDERING SYSTEM PROVIDED HISTORY: posterior left sided neck swelling/abscess concern, hx of shunt/EVD, tender to palpation, also roommate says she is recently wandering around sometimes saying things that don't make sense TECHNOLOGIST PROVIDED HISTORY: Reason for exam:->posterior left sided neck swelling/abscess concern, hx of shunt/EVD, tender to palpation, also roommate says she is recently wandering around sometimes saying things that don't make sense Reason for Exam: posterior left sided neck swelling/abscess concern, hx of shunt/EVD, tender to palpation, also roommate says she is recently wandering around sometimes saying things that don't make sense FINDINGS: There is left-sided  shunt with left parietal approach, extending through the left neck, right chest, and the

## 2024-11-28 NOTE — ED NOTES
RN calls receiving unit RN.  ED RN transfers care to receiving unit RN.  Patient updated on wait for transfer to receiving unit.

## 2024-11-29 LAB
ANION GAP SERPL CALCULATED.3IONS-SCNC: 10 MMOL/L (ref 3–16)
BASOPHILS # BLD: 0.1 K/UL (ref 0–0.2)
BASOPHILS NFR BLD: 1.2 %
BUN SERPL-MCNC: 13 MG/DL (ref 7–20)
CALCIUM SERPL-MCNC: 9 MG/DL (ref 8.3–10.6)
CHLORIDE SERPL-SCNC: 103 MMOL/L (ref 99–110)
CO2 SERPL-SCNC: 25 MMOL/L (ref 21–32)
CREAT SERPL-MCNC: 0.7 MG/DL (ref 0.6–1.1)
DEPRECATED RDW RBC AUTO: 13.7 % (ref 12.4–15.4)
EOSINOPHIL # BLD: 0.3 K/UL (ref 0–0.6)
EOSINOPHIL NFR BLD: 3.8 %
GFR SERPLBLD CREATININE-BSD FMLA CKD-EPI: >90 ML/MIN/{1.73_M2}
GLUCOSE BLD-MCNC: 143 MG/DL (ref 70–99)
GLUCOSE SERPL-MCNC: 100 MG/DL (ref 70–99)
HCT VFR BLD AUTO: 37.5 % (ref 36–48)
HGB BLD-MCNC: 12.9 G/DL (ref 12–16)
LYMPHOCYTES # BLD: 3 K/UL (ref 1–5.1)
LYMPHOCYTES NFR BLD: 36.6 %
MCH RBC QN AUTO: 29.3 PG (ref 26–34)
MCHC RBC AUTO-ENTMCNC: 34.6 G/DL (ref 31–36)
MCV RBC AUTO: 84.7 FL (ref 80–100)
MONOCYTES # BLD: 0.7 K/UL (ref 0–1.3)
MONOCYTES NFR BLD: 8 %
MRSA DNA SPEC QL NAA+PROBE: NORMAL
NEUTROPHILS # BLD: 4.2 K/UL (ref 1.7–7.7)
NEUTROPHILS NFR BLD: 50.4 %
PERFORMED ON: ABNORMAL
PLATELET # BLD AUTO: 249 K/UL (ref 135–450)
PMV BLD AUTO: 8 FL (ref 5–10.5)
POTASSIUM SERPL-SCNC: 4 MMOL/L (ref 3.5–5.1)
RBC # BLD AUTO: 4.42 M/UL (ref 4–5.2)
SODIUM SERPL-SCNC: 138 MMOL/L (ref 136–145)
WBC # BLD AUTO: 8.3 K/UL (ref 4–11)

## 2024-11-29 PROCEDURE — 99222 1ST HOSP IP/OBS MODERATE 55: CPT | Performed by: INTERNAL MEDICINE

## 2024-11-29 PROCEDURE — 85025 COMPLETE CBC W/AUTO DIFF WBC: CPT

## 2024-11-29 PROCEDURE — 2580000003 HC RX 258

## 2024-11-29 PROCEDURE — 36415 COLL VENOUS BLD VENIPUNCTURE: CPT

## 2024-11-29 PROCEDURE — 6370000000 HC RX 637 (ALT 250 FOR IP)

## 2024-11-29 PROCEDURE — 80048 BASIC METABOLIC PNL TOTAL CA: CPT

## 2024-11-29 PROCEDURE — 2500000003 HC RX 250 WO HCPCS

## 2024-11-29 PROCEDURE — 94760 N-INVAS EAR/PLS OXIMETRY 1: CPT

## 2024-11-29 PROCEDURE — 1200000000 HC SEMI PRIVATE

## 2024-11-29 PROCEDURE — 6360000002 HC RX W HCPCS

## 2024-11-29 RX ORDER — TRAMADOL HYDROCHLORIDE 50 MG/1
50 TABLET ORAL EVERY 6 HOURS PRN
Status: DISCONTINUED | OUTPATIENT
Start: 2024-11-29 | End: 2024-11-30 | Stop reason: HOSPADM

## 2024-11-29 RX ORDER — LINEZOLID 600 MG/1
600 TABLET, FILM COATED ORAL EVERY 12 HOURS SCHEDULED
Status: DISCONTINUED | OUTPATIENT
Start: 2024-11-29 | End: 2024-11-29

## 2024-11-29 RX ORDER — TRAMADOL HYDROCHLORIDE 50 MG/1
100 TABLET ORAL EVERY 6 HOURS PRN
Status: DISCONTINUED | OUTPATIENT
Start: 2024-11-29 | End: 2024-11-30 | Stop reason: HOSPADM

## 2024-11-29 RX ORDER — GABAPENTIN 400 MG/1
400 CAPSULE ORAL 3 TIMES DAILY
Status: DISCONTINUED | OUTPATIENT
Start: 2024-11-29 | End: 2024-11-30 | Stop reason: HOSPADM

## 2024-11-29 RX ORDER — 0.9 % SODIUM CHLORIDE 0.9 %
500 INTRAVENOUS SOLUTION INTRAVENOUS ONCE
Status: COMPLETED | OUTPATIENT
Start: 2024-11-29 | End: 2024-11-29

## 2024-11-29 RX ADMIN — DOXYCYCLINE 100 MG: 100 INJECTION, POWDER, LYOPHILIZED, FOR SOLUTION INTRAVENOUS at 11:29

## 2024-11-29 RX ADMIN — TRAMADOL HYDROCHLORIDE 100 MG: 50 TABLET, COATED ORAL at 08:43

## 2024-11-29 RX ADMIN — ATORVASTATIN CALCIUM 10 MG: 10 TABLET, FILM COATED ORAL at 20:31

## 2024-11-29 RX ADMIN — GABAPENTIN 400 MG: 400 CAPSULE ORAL at 08:45

## 2024-11-29 RX ADMIN — DOXYCYCLINE 100 MG: 100 INJECTION, POWDER, LYOPHILIZED, FOR SOLUTION INTRAVENOUS at 20:33

## 2024-11-29 RX ADMIN — GABAPENTIN 400 MG: 400 CAPSULE ORAL at 15:44

## 2024-11-29 RX ADMIN — SODIUM CHLORIDE 500 ML: 9 INJECTION, SOLUTION INTRAVENOUS at 11:59

## 2024-11-29 RX ADMIN — CYCLOBENZAPRINE 10 MG: 10 TABLET, FILM COATED ORAL at 08:45

## 2024-11-29 RX ADMIN — ENOXAPARIN SODIUM 40 MG: 100 INJECTION SUBCUTANEOUS at 08:45

## 2024-11-29 RX ADMIN — KETOROLAC TROMETHAMINE 30 MG: 30 INJECTION, SOLUTION INTRAMUSCULAR at 15:46

## 2024-11-29 RX ADMIN — GABAPENTIN 400 MG: 400 CAPSULE ORAL at 20:31

## 2024-11-29 ASSESSMENT — PAIN DESCRIPTION - ORIENTATION
ORIENTATION: LEFT;UPPER
ORIENTATION: LEFT
ORIENTATION: LEFT;UPPER
ORIENTATION: LEFT

## 2024-11-29 ASSESSMENT — PAIN DESCRIPTION - DESCRIPTORS
DESCRIPTORS: THROBBING
DESCRIPTORS: BURNING;THROBBING
DESCRIPTORS: BURNING;THROBBING
DESCRIPTORS: THROBBING

## 2024-11-29 ASSESSMENT — PAIN DESCRIPTION - LOCATION
LOCATION: NECK

## 2024-11-29 ASSESSMENT — PAIN DESCRIPTION - FREQUENCY
FREQUENCY: CONTINUOUS

## 2024-11-29 ASSESSMENT — PAIN DESCRIPTION - ONSET
ONSET: ON-GOING

## 2024-11-29 ASSESSMENT — PAIN DESCRIPTION - PAIN TYPE
TYPE: ACUTE PAIN
TYPE: ACUTE PAIN

## 2024-11-29 ASSESSMENT — PAIN SCALES - GENERAL
PAINLEVEL_OUTOF10: 9
PAINLEVEL_OUTOF10: 6
PAINLEVEL_OUTOF10: 7
PAINLEVEL_OUTOF10: 7

## 2024-11-29 NOTE — PROGRESS NOTES
chloride, potassium chloride **OR** potassium alternative oral replacement **OR** potassium chloride, magnesium sulfate, ondansetron **OR** ondansetron, polyethylene glycol      Intake/Output Summary (Last 24 hours) at 11/29/2024 1302  Last data filed at 11/29/2024 0106  Gross per 24 hour   Intake 600 ml   Output --   Net 600 ml       Physical Exam Performed:    BP (!) 89/51   Pulse 61   Temp 98.6 °F (37 °C) (Oral)   Resp 14   Ht 1.499 m (4' 11\")   Wt 77 kg (169 lb 12.1 oz)   LMP 11/13/2016   SpO2 94%   BMI 34.29 kg/m²     General appearance: No apparent distress  HEENT: Conjunctivae/corneas clear.  Neck: Supple, with full range of motion.  Pain with palpation over left posterior neck/trapezius muscle.  No significant swelling or erythema noted, no increased warmth noted to back of neck.  Respiratory:  Normal respiratory effort. Clear to auscultation, bilaterally without Rales/Wheezes/Rhonchi.  Cardiovascular: Regular rate and rhythm with normal S1/S2 without murmurs, rubs or gallops.  Abdomen: Soft, non-tender, non-distended with normal bowel sounds.  Musculoskeletal: No lower extremity edema  Skin: No increased erythema or warmth to posterior left side of neck, no lesion, wound or abscess noted.  Neurologic:  Face symmetrical, speech clear, moves all 4 extremities, sensations are intact bilaterally   Psychiatric: Alert and oriented.  Anxious      Labs:   Recent Labs     11/27/24  1818 11/28/24  0430 11/29/24  0631   WBC 11.6* 9.5 8.3   HGB 14.4 14.2 12.9   HCT 41.6 40.6 37.5    276 249     Recent Labs     11/27/24  1817 11/28/24  0430 11/29/24  0631    139 138   K 3.5 3.7 4.0    103 103   CO2 25 24 25   BUN 13 12 13   CREATININE 0.7 0.8 0.7   CALCIUM 9.4 8.9 9.0   PHOS  --  3.9  --      No results for input(s): \"AST\", \"ALT\", \"BILIDIR\", \"BILITOT\", \"ALKPHOS\" in the last 72 hours.  No results for input(s): \"INR\" in the last 72 hours.  No results for input(s): \"CKTOTAL\", \"TROPONINI\" in the  last 72 hours.    Urinalysis:      Lab Results   Component Value Date/Time    NITRU Negative 11/28/2024 12:57 PM    WBCUA 5 11/23/2024 06:54 PM    BACTERIA 2+ 11/23/2024 06:54 PM    RBCUA 3 11/23/2024 06:54 PM    BLOODU Negative 11/28/2024 12:57 PM    SPECGRAV 1.010 02/16/2024 03:10 PM    GLUCOSEU Negative 11/28/2024 12:57 PM    GLUCOSEU NEGATIVE 10/08/2011 10:10 PM       Radiology:  CT SOFT TISSUE NECK W CONTRAST   Final Result   1. Mild subcutaneous soft tissue stranding on within the soft tissues of the   posterior left neck, suggesting a cellulitis.   2. No abscess identified.   3. Multifocal ground-glass opacities within the upper lobes which is   nonspecific could represent an atypical or viral bronchopneumonia.         CT HEAD WO CONTRAST   Final Result   No acute intracranial abnormality.      Stable left frontal CSF shunt catheter.         XR SHUNT SERIES PLACEMENT (<4 VIEWS)   Final Result   Left-sided  shunt with left parietal approach, extending through the left   neck, right chest, and the tip is in the right upper abdomen. No   discontinuity.                 DVT Prophylaxis: Lovenox  Diet: ADULT DIET; Regular  Code Status: Full Code    PT/OT Eval Status: Not needed, patient at baseline    Dispo -Home medically stable likely this afternoon pending ID recommendations versus a.m.    Kathryn Flores PA-C      NOTE:  This report was transcribed using voice recognition software.  Every effort was made to ensure accuracy; however, inadvertent computerized transcription errors may be present.

## 2024-11-29 NOTE — PLAN OF CARE
Problem: Chronic Conditions and Co-morbidities  Goal: Patient's chronic conditions and co-morbidity symptoms are monitored and maintained or improved  Outcome: Progressing  Flowsheets (Taken 11/28/2024 0238 by Suyapa Curry, RN)  Care Plan - Patient's Chronic Conditions and Co-Morbidity Symptoms are Monitored and Maintained or Improved:   Monitor and assess patient's chronic conditions and comorbid symptoms for stability, deterioration, or improvement   Collaborate with multidisciplinary team to address chronic and comorbid conditions and prevent exacerbation or deterioration   Update acute care plan with appropriate goals if chronic or comorbid symptoms are exacerbated and prevent overall improvement and discharge     Problem: Discharge Planning  Goal: Discharge to home or other facility with appropriate resources  Outcome: Progressing  Flowsheets (Taken 11/28/2024 0238 by Suyapa Curry, RN)  Discharge to home or other facility with appropriate resources:   Identify barriers to discharge with patient and caregiver   Arrange for needed discharge resources and transportation as appropriate   Identify discharge learning needs (meds, wound care, etc)   Arrange for interpreters to assist at discharge as needed     Problem: Pain  Goal: Verbalizes/displays adequate comfort level or baseline comfort level  Outcome: Progressing  Flowsheets (Taken 11/28/2024 0238 by Suyapa Curry, RN)  Verbalizes/displays adequate comfort level or baseline comfort level:   Encourage patient to monitor pain and request assistance   Assess pain using appropriate pain scale   Administer analgesics based on type and severity of pain and evaluate response   Implement non-pharmacological measures as appropriate and evaluate response     Problem: Risk for Elopement  Goal: Patient will not exit the unit/facility without proper excort  Outcome: Progressing  Flowsheets (Taken 11/28/2024 2013)  Nursing Interventions for Elopement Risk:   Assist with  personal care needs such as toileting, eating, dressing, as needed to reduce the risk of wandering   Collaborate with treatment team for drug withdrawal symptoms treatment   Communicate/escalate to /other team member the risk of elopement   Collaborate with family members/caregivers to mitigate the elopement risk   Collaborate with treatment team for nicotine replacement   Communicate/escalate to charge nurse the risk of elopement     Problem: Safety - Adult  Goal: Free from fall injury  Outcome: Progressing  Flowsheets (Taken 11/28/2024 2013)  Free From Fall Injury: Instruct family/caregiver on patient safety     Problem: ABCDS Injury Assessment  Goal: Absence of physical injury  Outcome: Progressing  Flowsheets (Taken 11/28/2024 0238 by Suyapa Curry RN)  Absence of Physical Injury: Implement safety measures based on patient assessment

## 2024-11-29 NOTE — PROGRESS NOTES
Patient complaining of 10/10 neck pain even after Toradol. PerfectServe sent to Marline Murphy NP.

## 2024-11-29 NOTE — PROGRESS NOTES
Pt. Requesting for a doctor to come look at her neck as she feels the swelling is getting worse. PerfectServe sent to Marline Murphy NP regarding pts request.

## 2024-11-29 NOTE — CONSULTS
Infectious Diseases Inpatient Consult Note      Reason for Consult:  Cellulitis of the neck possible abscess  with WBC elevation     Requesting Physician:       Primary Care Physician:  Linda Fox APRN    History Obtained From:  Epic and pt     CHIEF COMPLAINT:     Chief Complaint   Patient presents with    Neck Pain     Neck pain hx. Bump on back of neck. Pt states it is growing. Hx of  shunt.          HISTORY OF PRESENT ILLNESS:  44 y.o. female with significant history for hydrocephalus,  shunt in place, chronic pain syndrome, history of MDRO infection, history of MRSA infection, history of kidney stones, admitted to hospital secondary to neck pain concern for cellulitis in the neck area on admission no recorded fevers, creatinine 0.7 CRP 39.2 WBC 11.6 urinalysis negative, respiratory molecular panel negative MRSA probe negative, CT soft tissue of the indicating mild subcutaneous stranding, no abscess noted, we are consulted for antibiotic recommendations      Past Medical History:    Past Medical History:   Diagnosis Date    ADHD (attention deficit hyperactivity disorder) 1988    Depression with anxiety     Difficult intubation     airway swelled up    Drug-seeking behavior     Encounter for imaging to screen for metal prior to MRI 06/01/2021    MRI Conditional Med[employer]onic Non-Programmable shunt model#[id number] implanted 10/30/2020 at [facility]. Normal Mode. 1.5T or 3.0T.    ESBL (extended spectrum beta-lactamase) producing bacteria infection 11/06/2019    urine    Functional ovarian cysts 2008    rt ovary cyst x 2 yrs.    GERD (gastroesophageal reflux disease) When I was a kid    Headache(784.0)     migraines    History of blood transfusion     at birth    History of kidney stones     History of PCOS     had hysterectomy in 2016    Hydrocephalus (HCC)     Hyperlipidemia     Irritable bowel syndrome 2004    had colonoscopy about 6 yrs ago    Meningitis 07/2018    MRSA  12:57 PM    SPECGRAV 1.010 02/16/2024 03:10 PM    BLOODU Negative 11/28/2024 12:57 PM    PHUR 5.5 11/28/2024 12:57 PM    PHUR 6.5 04/19/2024 01:59 PM    PROTEINU Negative 11/28/2024 12:57 PM    UROBILINOGEN 0.2 11/28/2024 12:57 PM    NITRU Negative 11/28/2024 12:57 PM    LEUKOCYTESUR Negative 11/28/2024 12:57 PM    URINETYPE Cleancatch 11/28/2024 12:57 PM      Urine Microscopic:   Lab Results   Component Value Date/Time    LABCAST 1-3 Hyaline 10/19/2014 10:57 AM    BACTERIA 2+ 11/23/2024 06:54 PM    COMU see below 09/21/2022 10:38 AM    HYALCAST 1 11/23/2024 06:54 PM    WBCUA 5 11/23/2024 06:54 PM    RBCUA 3 11/23/2024 06:54 PM     Urine Reflex to Culture:   Lab Results   Component Value Date/Time    URRFLXCULT Not Indicated 11/28/2024 12:57 PM     Crp 39.2     WBC   11.6     MICRO: cultures reviewed and updated by me       MRSA DNA Probe, Nasal [[id number]]Collected: 11/28/24 2038Order Status: SentSpecimen: Nasal from NaresUpdated: 11/28/24 2153Respiratory Panel Film Array Report [[id number]]Collected: 11/28/24 1247Order Status: CompletedUpdated: 11/28/24 1515ReportSEE IMAGERespiratory Panel, Molecular, with COVID-19 (Restricted: peds pts or suitable admitted adults) [[id number]]Collected: 11/28/24 1247Order Status: CompletedSpecimen: NasopharyngealUpdated: 11/28/24 1513Respiratory Panel PCR--Respiratory Pathogens Panel PCR Result: Not Detected  See additional report for complete Respiratory Pathogens Panel  Narrative:  ORDER#: [id number]                          ORDERED BY: TERE WALSH  SOURCE: Nasopharyngeal                     COLLECTED:  11/28/24 12:47  ANTIBIOTICS AT ANTELMO.:                      RECEIVED :  11/28/24 13:10  Blood Culture:   Lab Results   Component Value Date/Time    BC No Growth after 4 days of incubation. 03/16/2024 11:02 PM    BLOODCULT2 No Growth after 4 days of incubation. 03/16/2024 08:50 PM       Viral Culture:    Lab Results   Component Value Date/Time    COVID19 Not Detected

## 2024-11-29 NOTE — PROGRESS NOTES
Patient in bed, awake, a&ox4. Patient has had issues throughout the day starting out with patient BP, at start of shift, patient was hypotensive, patient BP was 90/50, and patient also didn't have IV access, and needed it for a bolus. CNP that was on patient case notified and ordered bolus of fluids, and had already seen and assessed patient early this morning, patient told CNP that she could not take oral abx. New orders in IV abx, administered and given. Patient started complaining that IV cite was burning at the time IV abx was infusing, BP was low. Writer was preparing to slow IV abx and start bolus but patient started to panic about IV burning worsening. IV Abx stopped and another nurse called to start ultrasound IV, nurse place ultrasound IV; IV abx restarted and bolus given per order. Patient continues to stay she is lightheaded and feels horrible, I reminded her to let the IV abx work. Her BP did come up after the bolus, she is no longer hypertensive. Patient continues to c/o pain in neck, always rated 9-10/10, PRN pain medication given as prescribed. Patient able to make needs known. No other needs mentioned. Call light and bedside table within reach. Will continue to monitor and reassess.

## 2024-11-29 NOTE — PROGRESS NOTES
Bedside introduction complete. Patient denies any needs at this time. Updated whiteboard with RN and PCA name and number. Patient able to make needs known, using call light appropriately. Will continue to monitor and assess for needs and comfort.

## 2024-11-29 NOTE — PROGRESS NOTES
New PIV placed to patient's L FA. Immediately after placing dressing on IV patient reported feeling lightheaded & dizzy. Patient laid back in the bed - supine w/ BLE elevated. Vitals and glucose obtained. Vitals are as follows:     Temp 98.7 orally   HR 68   RR 16   /62 (manual)   O2 95% on RA   Glucose 143    Patient provided ice chips & kelsi crackers. Warm wash cloth applied to patient's forehead as she reports ongoing severe headache. PRNs administered by patient's primary RN Luis earlier this morning for pain management.     Patient denies further needs from this RN at this time. SHANA Smith at the bedside.

## 2024-11-29 NOTE — PLAN OF CARE
Problem: Chronic Conditions and Co-morbidities  Goal: Patient's chronic conditions and co-morbidity symptoms are monitored and maintained or improved  Outcome: Progressing  Flowsheets (Taken 11/29/2024 1142)  Care Plan - Patient's Chronic Conditions and Co-Morbidity Symptoms are Monitored and Maintained or Improved: Monitor and assess patient's chronic conditions and comorbid symptoms for stability, deterioration, or improvement     Problem: Discharge Planning  Goal: Discharge to home or other facility with appropriate resources  Outcome: Progressing  Flowsheets (Taken 11/29/2024 1142)  Discharge to home or other facility with appropriate resources:   Identify barriers to discharge with patient and caregiver   Identify discharge learning needs (meds, wound care, etc)     Problem: Pain  Goal: Verbalizes/displays adequate comfort level or baseline comfort level  Outcome: Progressing  Flowsheets (Taken 11/28/2024 0238 by Suyapa Curry, RN)  Verbalizes/displays adequate comfort level or baseline comfort level:   Encourage patient to monitor pain and request assistance   Assess pain using appropriate pain scale   Administer analgesics based on type and severity of pain and evaluate response   Implement non-pharmacological measures as appropriate and evaluate response     Problem: Risk for Elopement  Goal: Patient will not exit the unit/facility without proper excort  Outcome: Progressing  Flowsheets (Taken 11/28/2024 2013 by Dalia Barbosa, RN)  Nursing Interventions for Elopement Risk:   Assist with personal care needs such as toileting, eating, dressing, as needed to reduce the risk of wandering   Collaborate with treatment team for drug withdrawal symptoms treatment   Communicate/escalate to /other team member the risk of elopement   Collaborate with family members/caregivers to mitigate the elopement risk   Collaborate with treatment team for nicotine replacement   Communicate/escalate to charge nurse  the risk of elopement     Problem: Safety - Adult  Goal: Free from fall injury  Outcome: Progressing  Flowsheets (Taken 11/28/2024 2013 by Dalia Barbosa, RN)  Free From Fall Injury: Instruct family/caregiver on patient safety     Problem: ABCDS Injury Assessment  Goal: Absence of physical injury  Outcome: Progressing  Flowsheets (Taken 11/28/2024 0238 by Suyapa Curry, RN)  Absence of Physical Injury: Implement safety measures based on patient assessment

## 2024-11-30 VITALS
WEIGHT: 167.55 LBS | BODY MASS INDEX: 33.78 KG/M2 | HEIGHT: 59 IN | SYSTOLIC BLOOD PRESSURE: 110 MMHG | RESPIRATION RATE: 18 BRPM | DIASTOLIC BLOOD PRESSURE: 74 MMHG | TEMPERATURE: 98.9 F | HEART RATE: 68 BPM | OXYGEN SATURATION: 97 %

## 2024-11-30 PROBLEM — R70.0 ELEVATED ERYTHROCYTE SEDIMENTATION RATE: Status: ACTIVE | Noted: 2024-11-30

## 2024-11-30 PROBLEM — R79.82 ELEVATED C-REACTIVE PROTEIN (CRP): Status: ACTIVE | Noted: 2024-11-30

## 2024-11-30 PROBLEM — M54.2 NECK PAIN: Status: ACTIVE | Noted: 2024-11-30

## 2024-11-30 PROCEDURE — 6370000000 HC RX 637 (ALT 250 FOR IP)

## 2024-11-30 PROCEDURE — 2580000003 HC RX 258

## 2024-11-30 PROCEDURE — 6360000002 HC RX W HCPCS

## 2024-11-30 PROCEDURE — 2500000003 HC RX 250 WO HCPCS

## 2024-11-30 PROCEDURE — 94760 N-INVAS EAR/PLS OXIMETRY 1: CPT

## 2024-11-30 RX ORDER — PROMETHAZINE HYDROCHLORIDE 25 MG/1
25 TABLET ORAL EVERY 6 HOURS PRN
Qty: 20 TABLET | Refills: 0 | Status: SHIPPED | OUTPATIENT
Start: 2024-11-30 | End: 2024-12-07

## 2024-11-30 RX ORDER — DOXYCYCLINE HYCLATE 100 MG
100 TABLET ORAL 2 TIMES DAILY
Qty: 10 TABLET | Refills: 0 | Status: SHIPPED | OUTPATIENT
Start: 2024-11-30 | End: 2024-12-05

## 2024-11-30 RX ADMIN — ENOXAPARIN SODIUM 40 MG: 100 INJECTION SUBCUTANEOUS at 09:49

## 2024-11-30 RX ADMIN — GABAPENTIN 400 MG: 400 CAPSULE ORAL at 09:49

## 2024-11-30 RX ADMIN — DOXYCYCLINE 100 MG: 100 INJECTION, POWDER, LYOPHILIZED, FOR SOLUTION INTRAVENOUS at 09:56

## 2024-11-30 RX ADMIN — GABAPENTIN 400 MG: 400 CAPSULE ORAL at 14:33

## 2024-11-30 RX ADMIN — SODIUM CHLORIDE, PRESERVATIVE FREE 10 ML: 5 INJECTION INTRAVENOUS at 09:51

## 2024-11-30 NOTE — PLAN OF CARE
Problem: Chronic Conditions and Co-morbidities  Goal: Patient's chronic conditions and co-morbidity symptoms are monitored and maintained or improved  11/29/2024 2052 by Dandre Garces RN  Outcome: Progressing  11/29/2024 1549 by Luis Campbell RN  Outcome: Progressing  Flowsheets (Taken 11/29/2024 1142)  Care Plan - Patient's Chronic Conditions and Co-Morbidity Symptoms are Monitored and Maintained or Improved: Monitor and assess patient's chronic conditions and comorbid symptoms for stability, deterioration, or improvement     Problem: Discharge Planning  Goal: Discharge to home or other facility with appropriate resources  11/29/2024 2052 by Dandre Garces RN  Outcome: Progressing  11/29/2024 1549 by Luis Campbell RN  Outcome: Progressing  Flowsheets (Taken 11/29/2024 1142)  Discharge to home or other facility with appropriate resources:   Identify barriers to discharge with patient and caregiver   Identify discharge learning needs (meds, wound care, etc)     Problem: Pain  Goal: Verbalizes/displays adequate comfort level or baseline comfort level  11/29/2024 2052 by Dandre Garces RN  Outcome: Progressing  11/29/2024 1549 by Luis Campbell RN  Outcome: Progressing  Flowsheets (Taken 11/28/2024 0238 by Suyapa Curry RN)  Verbalizes/displays adequate comfort level or baseline comfort level:   Encourage patient to monitor pain and request assistance   Assess pain using appropriate pain scale   Administer analgesics based on type and severity of pain and evaluate response   Implement non-pharmacological measures as appropriate and evaluate response     Problem: Risk for Elopement  Goal: Patient will not exit the unit/facility without proper excort  11/29/2024 2052 by Dandre Garces RN  Outcome: Progressing  11/29/2024 1549 by Luis Campbell RN  Outcome: Progressing  Flowsheets (Taken 11/28/2024 2013 by Dalia Barbosa, RN)  Nursing Interventions for Elopement Risk:   Assist  with personal care needs such as toileting, eating, dressing, as needed to reduce the risk of wandering   Collaborate with treatment team for drug withdrawal symptoms treatment   Communicate/escalate to /other team member the risk of elopement   Collaborate with family members/caregivers to mitigate the elopement risk   Collaborate with treatment team for nicotine replacement   Communicate/escalate to charge nurse the risk of elopement     Problem: Safety - Adult  Goal: Free from fall injury  11/29/2024 2052 by Dandre Garces, RN  Outcome: Progressing  Flowsheets (Taken 11/29/2024 2051)  Free From Fall Injury: Instruct family/caregiver on patient safety  11/29/2024 1549 by Luis Campbell, RN  Outcome: Progressing  Flowsheets (Taken 11/28/2024 2013 by Dalia Barbosa, RN)  Free From Fall Injury: Instruct family/caregiver on patient safety     Problem: ABCDS Injury Assessment  Goal: Absence of physical injury  11/29/2024 2052 by Dandre Garces RN  Outcome: Progressing  Flowsheets (Taken 11/29/2024 2051)  Absence of Physical Injury: Implement safety measures based on patient assessment  11/29/2024 1549 by Luis Campbell, RN  Outcome: Progressing  Flowsheets (Taken 11/28/2024 0238 by Suyapa Curry, RN)  Absence of Physical Injury: Implement safety measures based on patient assessment

## 2024-11-30 NOTE — CARE COORDINATION
Case Management Assessment  Initial Evaluation    Date/Time of Evaluation: 11/30/2024 9:26 AM  Assessment Completed by: Tamie Mroeira RN    If patient is discharged prior to next notation, then this note serves as note for discharge by case management.    Patient Name: Petrona Green                   YOB: 1980  Diagnosis: Elevated C-reactive protein (CRP) [R79.82]  Cellulitis of neck [L03.221]  Neck pain [M54.2]  Elevated erythrocyte sedimentation rate [R70.0]  Atypical pneumonia [J18.9]  Goals of care, counseling/discussion [Z71.89]  Sepsis (HCC) [A41.9]  Leukocytosis, unspecified type [D72.829]                   Date / Time: 11/27/2024  5:44 PM    Patient Admission Status: Inpatient   Readmission Risk (Low < 19, Mod (19-27), High > 27): Readmission Risk Score: 9.9    Current PCP: Linda Fox APRN  PCP verified by CM? Yes    Chart Reviewed: Yes      History Provided by: Medical Record  Patient Orientation: Alert and Oriented    Patient Cognition: Alert    Hospitalization in the last 30 days (Readmission):  No    If yes, Readmission Assessment in CM Navigator will be completed.    Advance Directives:      Code Status: Full Code   Patient's Primary Decision Maker is: Legal Next of Kin    Primary Decision Maker: JasonSatya - Rosana - [phone]    Discharge Planning:    Patient lives with: Spouse/Significant Other Type of Home: Apartment  Primary Care Giver: Self  Patient Support Systems include: Family Members, Spouse/Significant Other   Current Financial resources: Medicaid  Current community resources: None  Current services prior to admission: None            Current DME:  None            Type of Home Care services:  None    ADLS  Prior functional level: Independent in ADLs/IADLs  Current functional level: Independent in ADLs/IADLs    No current PT/OT orders    Family can provide assistance at DC: Yes  Would you like Case Management to discuss the discharge plan with any other family  [phone]

## 2024-11-30 NOTE — PLAN OF CARE
Problem: Chronic Conditions and Co-morbidities  Goal: Patient's chronic conditions and co-morbidity symptoms are monitored and maintained or improved  11/30/2024 0800 by Evelia Black RN  Outcome: Progressing  Flowsheets (Taken 11/30/2024 0800)  Care Plan - Patient's Chronic Conditions and Co-Morbidity Symptoms are Monitored and Maintained or Improved:   Monitor and assess patient's chronic conditions and comorbid symptoms for stability, deterioration, or improvement   Collaborate with multidisciplinary team to address chronic and comorbid conditions and prevent exacerbation or deterioration   Update acute care plan with appropriate goals if chronic or comorbid symptoms are exacerbated and prevent overall improvement and discharge  11/29/2024 2052 by Dandre Garces RN  Outcome: Progressing     Problem: Discharge Planning  Goal: Discharge to home or other facility with appropriate resources  11/30/2024 0800 by Evelia Black RN  Outcome: Progressing  Flowsheets (Taken 11/30/2024 0800)  Discharge to home or other facility with appropriate resources:   Identify barriers to discharge with patient and caregiver   Identify discharge learning needs (meds, wound care, etc)  11/29/2024 2052 by Dandre Garces, RN  Outcome: Progressing     Problem: Pain  Goal: Verbalizes/displays adequate comfort level or baseline comfort level  11/30/2024 0800 by Evelia Black RN  Outcome: Progressing  Flowsheets (Taken 11/30/2024 0800)  Verbalizes/displays adequate comfort level or baseline comfort level:   Encourage patient to monitor pain and request assistance   Administer analgesics based on type and severity of pain and evaluate response   Consider cultural and social influences on pain and pain management   Assess pain using appropriate pain scale   Implement non-pharmacological measures as appropriate and evaluate response   Notify Licensed Independent Practitioner if interventions unsuccessful or patient

## 2024-11-30 NOTE — PROGRESS NOTES
Pt resting in bed this a.m. She c/o pain in lower back, but declined pain medication. She reports having some nausea and has chronic numbness and tingling in extremities. Fall precautions in place, belongings within reach. Continue care.

## 2024-11-30 NOTE — PROGRESS NOTES
Data- discharge order received, pt verbalized agreement to discharge, disposition to previous residence, no needs for HHC/DME.     Action- discharge instructions prepared and given to patient, pt verbalized understanding. Medication information packet given r/t NEW and/or CHANGED prescriptions emphasizing name/purpose/side effects, pt verbalized understanding. Discharge instruction summary: Diet- general, Activity- UAL, Primary Care Physician as follows: Linda Fox, APRN [phone] f/u appointment as needed, immunizations reviewed and are up to date, prescription medications filled by [facility].      Response- Pt belongings gathered, IV removed. Disposition is home (no HHC/DME needs), pt walked independently from unit with belongings, no complications.

## 2024-11-30 NOTE — PROGRESS NOTES
Physician Progress Note      PATIENT:               CAROLINE MEJÍA  CSN #:                  [id number]  :                       1980  ADMIT DATE:       2024 5:44 PM  DISCH DATE:        2024 2:45 PM  RESPONDING  PROVIDER #:        Eran Chacon MD          QUERY TEXT:    Patient admitted with cellulitis. Noted documentation of sepsis in progress   note with no fever or bandemia, and wbc <12. In order to support the diagnosis   of sepsis, please include additional clinical indicators in your   documentation.  Or please document if the diagnosis of sepsis has been ruled   out after further study    The medical record reflects the following:  Risk Factors: cellulitis, viral pneumonia,  shunt  Clinical Indicators: wbc 11.6, temp 97.6, pulse 95.  Per notes \"Sepsis, POA.   Cellulitis of left neck, Criteria met, heart rate greater than 90,   leukocytosis 11.6\".  Treatment: IV  linezolid and cefepime, IVF, serial  labs, supportive care, ID   consult.  Options provided:  -- Sepsis present as evidenced by, Please document evidence.  -- Sepsis was ruled out after study  -- Other - I will add my own diagnosis  -- Disagree - Not applicable / Not valid  -- Disagree - Clinically unable to determine / Unknown  -- Refer to Clinical Documentation Reviewer    PROVIDER RESPONSE TEXT:    Sepsis was ruled out after study.    Query created by: Sara Robbins on 2024 12:40 PM      Electronically signed by:  Eran Chacon MD 2024 4:14 PM

## 2024-11-30 NOTE — DISCHARGE SUMMARY
Hospital Medicine Discharge Summary    Patient ID: Petrona Green      Patient's PCP: Linda Fox APRN    Admit Date: 11/27/2024     Discharge Date:   11/30/24    Admitting Physician: Janneth Nye MD     Discharging Provider: Mary Jo Fabian PA-C       Hospital Course: 44-year-old female with past medical history of hydrocephalus s/p  shunt, nonepileptic seizures, anxiety, ADHD who presented to the ED with multiple complaints including lightheadedness, dizziness, constipation, nausea, vomiting, fevers, eye pain, eye redness and swelling. While in the ED, she met sepsis criteria due to tachycardia of a heart rate greater than 90 and leukocytosis.  CT soft tissue of the neck was obtained which suggested posterior left neck cellulitis with no abscess or fluid collection identified.  She was empirically started on IV vancomycin/cefepime and admitted for further workup/treatment.  Antibiotics adjusted to linezolid and cefepime due to patient's inability to tolerate vancomycin. Patient then developed redness over her chest and arms with cefepime and cefepime was discontinued. Patient's IV access was lost, she was transitioned to oral antibiotics. Patient stated she could not take oral antibiotics at all because she is worried about developing nausea.  Infectious disease was consulted for antibiotic recommendations. Per ID, no indication for IV antibiotics at discharge. It was recommended patient continue oral doxycycline 100 mg BID x 5 days. Patient had resolution of leukocytosis and no fevers. Her symptoms overall improved. She was discharged home ins stable condition. Patient was instructed to follow closely with her PCP for hospital follow up.    Sepsis, POA  Cellulitis of left neck  -Criteria met, heart rate greater than 90, leukocytosis 11.6  -CT soft tissue of the neck with contrast showed mild subcutaneous soft tissue stranding within the soft tissue of the posterior left neck suggesting

## 2024-12-02 ENCOUNTER — CARE COORDINATION (OUTPATIENT)
Dept: CASE MANAGEMENT | Age: 44
End: 2024-12-02

## 2024-12-02 ENCOUNTER — TELEPHONE (OUTPATIENT)
Dept: INTERNAL MEDICINE CLINIC | Age: 44
End: 2024-12-02

## 2024-12-02 DIAGNOSIS — M54.2 NECK PAIN: Primary | ICD-10-CM

## 2024-12-02 NOTE — TELEPHONE ENCOUNTER
----- Message from SHANA GUZMAN RN sent at 12/2/2024  2:25 PM EST -----  Petrona is not taking her oral antibiotics. States she took them initially - vomited and has not continued them. She also states she has a rash at the corners of her mouth - denies any new substances. Also needs a referral to [facility] - if she can get her records from [place] for shunt follow up.    Thank You    Misa Emery RN BSN  Care Transition Nurse  [phone]

## 2024-12-02 NOTE — CARE COORDINATION
Care Transitions Note    Initial Call - Call within 2 business days of discharge: Yes    Patient Current Location:  Home: [address] [place] [address] [address]  [facility] [address]    Care Transition Nurse contacted the patient by telephone to perform post hospital discharge assessment, verified name and  as identifiers. Provided introduction to self, and explanation of the Care Transition Nurse role.     Patient: Petrona Green    Patient : 1980   MRN: [id number]    Reason for Admission: Neck pain  Discharge Date: 24  RURS: Readmission Risk Score: 9.9      Last Discharge Facility       Date Complaint Diagnosis Description Type Department Provider    24 Neck Pain Cellulitis of neck ... ED to Hosp-Admission (Discharged) (ADMITTED) [facility] Eran Chacon MD; Shayna Jha ...            Was this an external facility discharge? No    Additional needs identified to be addressed with provider   Patient is not taking oral antibiotics - states she took them initially and vomited them back up and is not currently taking them. States she is nauseated today - no emesis today.             Method of communication with provider: staff message.    Patients top risk factors for readmission: medical condition-neck pain    Interventions to address risk factors:   Review of patient management of conditions/medications: neck pain    Care Summary Note: Petrona states she is \"ok.\"  She states she continues to be lightheaded but feels it is decreased from when she was in the hospital. Petrona states she continues to be dizzy but feels it is the same as when she was in the hospital. She states her bowels have moved since she returned home.  Petrona states she remains nauseated - denies any emesis today. States she is taking fluids - but not really any food d/t her nausea. Petrona denies any fever - eye pain - eye redness - or eye edema.  She states her neck pain \"is still there\" and rates it at a 6/10. Petrona states she has a rash at the corners of

## 2024-12-10 ENCOUNTER — CARE COORDINATION (OUTPATIENT)
Dept: CASE MANAGEMENT | Age: 44
End: 2024-12-10

## 2024-12-10 NOTE — CARE COORDINATION
Care Transitions Note    Follow Up Call     Attempted to reach patient for transitions of care follow up.  Unable to reach patient.      Outreach Attempts:   HIPAA compliant voicemail left for patient.     Care Summary Note:     Follow Up Appointment:       Plan for follow-up call in 2-5 days based on severity of symptoms and risk factors. Plan for next call: symptom management-   self management-     Cha Hylton LPN

## 2024-12-13 ENCOUNTER — CARE COORDINATION (OUTPATIENT)
Dept: CASE MANAGEMENT | Age: 44
End: 2024-12-13

## 2024-12-13 NOTE — CARE COORDINATION
Care Transitions Note    Follow Up Call     Patient Current Location:  Home: [address]  [facility] [address]    Care Transition Nurse contacted the patient by telephone. Verified name and  as identifiers.    Additional needs identified to be addressed with provider   No needs identified                 Method of communication with provider: none.    Care Summary Note: Petrona states she is having a \"bad migraine\". Writer offered to call back. Petrona said no and carried on a conversation.  She states she continues to feel lightheaded, but feels it remains the same as when she was in the hospital.  Petrona states she is very \"tired.\"  She states she continues to feel dizzy but feels it is the same as when she was in the hospital. Petrona states her bowels are moving without difficulty - denies any constipation.  She denies any N/V, fever, or eye pain - redness - or edema at this time. Petrona states her neck pain is still there - she rates it 8/10 at this time. She states the rash at the edges of her mouth is gone.  Petrona states she was able to get the surgeon's info that placed her shunt. She is struggling to get the records from [place]. They want her to \"pick them up\" or have the physician she will be seeing request them. Petrona will reach out to [facility] to attempt to schedule and see if they can help with this. She states she has not completed the antibiotics . Petrona states she has not been able to get an appt with her PCP office. Writer will place a call to the office. She states she will not return to the hospital because it causes her anxiety. CT team will follow.    Call placed to PCP office. Spoke with reception. They will reach out to Petrona to schedule a HFU appt.         Advance Care Planning:   Does patient have an Advance Directive:  not reviewed. Petrona is <65 and is not a readmit .    Medication Review:  Full medication reconciliation completed during previous call.        Assessments:  Care Transitions Subsequent

## 2024-12-15 DIAGNOSIS — M54.16 LUMBAR BACK PAIN WITH RADICULOPATHY AFFECTING LEFT LOWER EXTREMITY: ICD-10-CM

## 2024-12-16 RX ORDER — GABAPENTIN 400 MG/1
CAPSULE ORAL
Qty: 120 CAPSULE | Refills: 11 | Status: SHIPPED | OUTPATIENT
Start: 2024-12-16 | End: 2025-12-16

## 2024-12-16 NOTE — TELEPHONE ENCOUNTER
Future Appointments   Date Time Provider Department Center   12/26/2024 10:30 AM Ananya Siu APRN - CNP [facility] DE[facility]       Last appt 7/25/24

## 2024-12-19 ENCOUNTER — HOSPITAL ENCOUNTER (EMERGENCY)
Age: 44
Discharge: LEFT BEFORE DISCHARGE | End: 2024-12-19
Payer: COMMERCIAL

## 2024-12-19 ENCOUNTER — APPOINTMENT (OUTPATIENT)
Dept: GENERAL RADIOLOGY | Age: 44
End: 2024-12-19
Payer: COMMERCIAL

## 2024-12-19 ENCOUNTER — APPOINTMENT (OUTPATIENT)
Dept: CT IMAGING | Age: 44
End: 2024-12-19
Payer: COMMERCIAL

## 2024-12-19 VITALS
SYSTOLIC BLOOD PRESSURE: 107 MMHG | DIASTOLIC BLOOD PRESSURE: 94 MMHG | OXYGEN SATURATION: 95 % | WEIGHT: 171.52 LBS | BODY MASS INDEX: 34.64 KG/M2 | TEMPERATURE: 98.5 F | HEART RATE: 97 BPM | RESPIRATION RATE: 20 BRPM

## 2024-12-19 DIAGNOSIS — R10.9 ABDOMINAL PAIN, UNSPECIFIED ABDOMINAL LOCATION: ICD-10-CM

## 2024-12-19 DIAGNOSIS — M54.2 NECK PAIN: ICD-10-CM
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
## 2024-12-19 DIAGNOSIS — M54.9 ACUTE BACK PAIN, UNSPECIFIED BACK LOCATION, UNSPECIFIED BACK PAIN LATERALITY: ICD-10-CM

## 2024-12-19 DIAGNOSIS — M25.512 ACUTE PAIN OF LEFT SHOULDER: ICD-10-CM

## 2024-12-19 DIAGNOSIS — S09.90XA INJURY OF HEAD, INITIAL ENCOUNTER: ICD-10-CM

## 2024-12-19 LAB
ALBUMIN SERPL-MCNC: 4.2 G/DL (ref 3.4–5)
ALBUMIN/GLOB SERPL: 1.4 {RATIO} (ref 1.1–2.2)
ALP SERPL-CCNC: 113 U/L (ref 40–129)
ALT SERPL-CCNC: 17 U/L (ref 10–40)
ANION GAP SERPL CALCULATED.3IONS-SCNC: 13 MMOL/L (ref 3–16)
AST SERPL-CCNC: 18 U/L (ref 15–37)
BASOPHILS # BLD: 0.1 K/UL (ref 0–0.2)
BASOPHILS NFR BLD: 0.6 %
BILIRUB SERPL-MCNC: 0.4 MG/DL (ref 0–1)
BUN SERPL-MCNC: 11 MG/DL (ref 7–20)
CALCIUM SERPL-MCNC: 9.6 MG/DL (ref 8.3–10.6)
CHLORIDE SERPL-SCNC: 100 MMOL/L (ref 99–110)
CO2 SERPL-SCNC: 27 MMOL/L (ref 21–32)
CREAT SERPL-MCNC: 0.7 MG/DL (ref 0.6–1.1)
DEPRECATED RDW RBC AUTO: 14.3 % (ref 12.4–15.4)
EOSINOPHIL # BLD: 0.3 K/UL (ref 0–0.6)
EOSINOPHIL NFR BLD: 2.6 %
GFR SERPLBLD CREATININE-BSD FMLA CKD-EPI: >90 ML/MIN/{1.73_M2}
GLUCOSE SERPL-MCNC: 117 MG/DL (ref 70–99)
HCG SERPL QL: NEGATIVE
HCT VFR BLD AUTO: 44.4 % (ref 36–48)
HGB BLD-MCNC: 15.2 G/DL (ref 12–16)
LIPASE SERPL-CCNC: 15 U/L (ref 13–60)
LYMPHOCYTES # BLD: 3.3 K/UL (ref 1–5.1)
LYMPHOCYTES NFR BLD: 26.6 %
MCH RBC QN AUTO: 28.8 PG (ref 26–34)
MCHC RBC AUTO-ENTMCNC: 34.3 G/DL (ref 31–36)
MCV RBC AUTO: 84 FL (ref 80–100)
MONOCYTES # BLD: 0.8 K/UL (ref 0–1.3)
MONOCYTES NFR BLD: 6.6 %
NEUTROPHILS # BLD: 7.9 K/UL (ref 1.7–7.7)
NEUTROPHILS NFR BLD: 63.6 %
PLATELET # BLD AUTO: 258 K/UL (ref 135–450)
PMV BLD AUTO: 7.9 FL (ref 5–10.5)
POTASSIUM SERPL-SCNC: 3.8 MMOL/L (ref 3.5–5.1)
PROT SERPL-MCNC: 7.2 G/DL (ref 6.4–8.2)
RBC # BLD AUTO: 5.29 M/UL (ref 4–5.2)
SODIUM SERPL-SCNC: 140 MMOL/L (ref 136–145)
WBC # BLD AUTO: 12.5 K/UL (ref 4–11)

## 2024-12-19 PROCEDURE — 83690 ASSAY OF LIPASE: CPT

## 2024-12-19 PROCEDURE — 85025 COMPLETE CBC W/AUTO DIFF WBC: CPT

## 2024-12-19 PROCEDURE — 73030 X-RAY EXAM OF SHOULDER: CPT

## 2024-12-19 PROCEDURE — 84703 CHORIONIC GONADOTROPIN ASSAY: CPT

## 2024-12-19 PROCEDURE — 70250 X-RAY EXAM OF SKULL: CPT

## 2024-12-19 PROCEDURE — 80053 COMPREHEN METABOLIC PANEL: CPT

## 2024-12-19 PROCEDURE — 99284 EMERGENCY DEPT VISIT MOD MDM: CPT

## 2024-12-19 RX ORDER — IOPAMIDOL 755 MG/ML
75 INJECTION, SOLUTION INTRAVASCULAR
Status: DISCONTINUED | OUTPATIENT
Start: 2024-12-19 | End: 2024-12-19 | Stop reason: HOSPADM

## 2024-12-19 ASSESSMENT — PAIN DESCRIPTION - DESCRIPTORS: DESCRIPTORS: ACHING;SHARP

## 2024-12-19 ASSESSMENT — PAIN DESCRIPTION - LOCATION: LOCATION: HEAD;ABDOMEN;BACK

## 2024-12-19 ASSESSMENT — PAIN - FUNCTIONAL ASSESSMENT: PAIN_FUNCTIONAL_ASSESSMENT: 0-10

## 2024-12-19 ASSESSMENT — PAIN SCALES - GENERAL: PAINLEVEL_OUTOF10: 8

## 2024-12-19 NOTE — ED PROVIDER NOTES
Cancer Neg Hx     Hypertension Neg Hx     Migraines Neg Hx     Ovarian Cancer Neg Hx     Rashes/Skin Problems Neg Hx     Seizures Neg Hx     Thyroid Disease Neg Hx         SOCIAL HISTORY       Social History     Tobacco Use    Smoking status: Every Day     Current packs/day: 0.25     Average packs/day: 0.3 packs/day for 24.2 years (6.0 ttl pk-yrs)     Types: Cigarettes     Start date: 10/10/2000    Smokeless tobacco: Never   Vaping Use    Vaping status: Never Used   Substance Use Topics    Alcohol use: No    Drug use: Never       SCREENINGS        Aden Coma Scale  Eye Opening: Spontaneous  Best Verbal Response: Oriented  Best Motor Response: Obeys commands  [place] Coma Scale Score: 15                CIWA Assessment  BP: (!) 107/94  Pulse: 97           PHYSICAL EXAM  1 or more Elements     ED Triage Vitals [12/19/24 1024]   BP Systolic BP Percentile Diastolic BP Percentile Temp Temp Source Pulse Respirations SpO2   (!) 107/94 -- -- 98.5 °F (36.9 °C) Oral 97 20 95 %      Height Weight - Scale         -- 77.8 kg (171 lb 8.3 oz)             Physical Exam  Constitutional:       General: She is not in acute distress.     Appearance: Normal appearance. She is well-developed. She is not ill-appearing, toxic-appearing or diaphoretic.   HENT:      Head: Normocephalic and atraumatic.      Comments: No raccoon eyes or rachel signs bilaterally     Right Ear: Tympanic membrane, ear canal and external ear normal.      Left Ear: Tympanic membrane, ear canal and external ear normal.      Ears:      Comments: No hemotympanum bilaterally     Nose: Nose normal.      Comments: No septal hematoma bilaterally  No erythema, edema, ecchymosis,deformity of the nasal septum  Eyes:      Conjunctiva/sclera: Conjunctivae normal.      Pupils: Pupils are equal, round, and reactive to light.   Cardiovascular:      Rate and Rhythm: Normal rate and regular rhythm.      Heart sounds: Normal heart sounds.   Pulmonary:      Effort: Pulmonary effort

## 2024-12-19 NOTE — ED NOTES
This RN called to registration desk as pt is stating that she is leaving and would like her IV removed.  Pt's IV removed and 2x2 gauze put into place and secured with tape.  Pt declines AMA form.  Ambulates out of ED at this time.  [place] even and steady.  No distress noted at time of departure.

## 2024-12-20 ENCOUNTER — CARE COORDINATION (OUTPATIENT)
Dept: CASE MANAGEMENT | Age: 44
End: 2024-12-20
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
## 2024-12-27 ENCOUNTER — CARE COORDINATION (OUTPATIENT)
Dept: CASE MANAGEMENT | Age: 44
End: 2024-12-27
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
## 2025-01-02 ENCOUNTER — PATIENT MESSAGE (OUTPATIENT)
Dept: INTERNAL MEDICINE CLINIC | Age: 45
End: 2025-01-02

## 2025-01-02 DIAGNOSIS — M54.16 LUMBAR BACK PAIN WITH RADICULOPATHY AFFECTING LEFT LOWER EXTREMITY: ICD-10-CM

## 2025-01-07 RX ORDER — GABAPENTIN 400 MG/1
CAPSULE ORAL
Qty: 120 CAPSULE | Refills: 0 | Status: SHIPPED | OUTPATIENT
Start: 2025-01-07 | End: 2026-01-02

## 2025-01-12 DIAGNOSIS — M54.16 LUMBAR BACK PAIN WITH RADICULOPATHY AFFECTING LEFT LOWER EXTREMITY: ICD-10-CM

## 2025-01-13 RX ORDER — GABAPENTIN 400 MG/1
CAPSULE ORAL
Qty: 120 CAPSULE | Refills: 0 | OUTPATIENT
Start: 2025-01-13

## 2025-02-02 DIAGNOSIS — M54.16 LUMBAR BACK PAIN WITH RADICULOPATHY AFFECTING LEFT LOWER EXTREMITY: ICD-10-CM

## 2025-02-03 RX ORDER — GABAPENTIN 400 MG/1
CAPSULE ORAL
Qty: 120 CAPSULE | Refills: 0 | Status: SHIPPED | OUTPATIENT
Start: 2025-02-07 | End: 2025-03-07

## 2025-02-03 RX ORDER — GABAPENTIN 400 MG/1
CAPSULE ORAL
Qty: 120 CAPSULE | Refills: 0 | Status: SHIPPED | OUTPATIENT
Start: 2025-02-07 | End: 2025-02-03 | Stop reason: SDUPTHER

## 2025-02-10 ENCOUNTER — TELEPHONE (OUTPATIENT)
Dept: INTERNAL MEDICINE CLINIC | Age: 45
End: 2025-02-10

## 2025-02-10 ENCOUNTER — APPOINTMENT (OUTPATIENT)
Dept: GENERAL RADIOLOGY | Age: 45
End: 2025-02-10
Payer: COMMERCIAL

## 2025-02-10 ENCOUNTER — HOSPITAL ENCOUNTER (EMERGENCY)
Age: 45
Discharge: HOME | End: 2025-02-10
Payer: COMMERCIAL

## 2025-02-10 VITALS
DIASTOLIC BLOOD PRESSURE: 77 MMHG | OXYGEN SATURATION: 98 % | TEMPERATURE: 98.9 F | RESPIRATION RATE: 16 BRPM | BODY MASS INDEX: 34.64 KG/M2 | HEART RATE: 96 BPM | HEIGHT: 59 IN | SYSTOLIC BLOOD PRESSURE: 123 MMHG

## 2025-02-10 DIAGNOSIS — S92.492A OTHER FRACTURE OF LEFT GREAT TOE, INITIAL ENCOUNTER FOR CLOSED FRACTURE: Primary | ICD-10-CM

## 2025-02-10 PROCEDURE — 73630 X-RAY EXAM OF FOOT: CPT

## 2025-02-10 PROCEDURE — 6370000000 HC RX 637 (ALT 250 FOR IP): Performed by: PHYSICIAN ASSISTANT

## 2025-02-10 PROCEDURE — 99283 EMERGENCY DEPT VISIT LOW MDM: CPT

## 2025-02-10 RX ORDER — IBUPROFEN 400 MG/1
800 TABLET, FILM COATED ORAL ONCE
Status: COMPLETED | OUTPATIENT
Start: 2025-02-10 | End: 2025-02-10

## 2025-02-10 RX ADMIN — IBUPROFEN 800 MG: 400 TABLET, FILM COATED ORAL at 03:07

## 2025-02-10 SDOH — HEALTH STABILITY: PHYSICAL HEALTH
ON AVERAGE, HOW MANY DAYS PER WEEK DO YOU ENGAGE IN MODERATE TO STRENUOUS EXERCISE (LIKE A BRISK WALK)?: PATIENT DECLINED

## 2025-02-10 ASSESSMENT — PAIN - FUNCTIONAL ASSESSMENT
PAIN_FUNCTIONAL_ASSESSMENT: 0-10
PAIN_FUNCTIONAL_ASSESSMENT: 0-10

## 2025-02-10 ASSESSMENT — PAIN SCALES - GENERAL
PAINLEVEL_OUTOF10: 9
PAINLEVEL_OUTOF10: 10
PAINLEVEL_OUTOF10: 9
PAINLEVEL_OUTOF10: 10

## 2025-02-10 ASSESSMENT — PAIN DESCRIPTION - ORIENTATION: ORIENTATION: LEFT

## 2025-02-10 ASSESSMENT — PAIN DESCRIPTION - PAIN TYPE: TYPE: ACUTE PAIN

## 2025-02-10 ASSESSMENT — PAIN DESCRIPTION - LOCATION: LOCATION: FOOT

## 2025-02-10 NOTE — TELEPHONE ENCOUNTER
Patient called stating that she broke her ankle/toe and has an appt on 02/12/25 with Ortho. The ER didn't send her home with any pain meds and told her to take tylenol.  She is wanting to know if you could send her some pain meds to help till she has the ortho appt.   Please advise

## 2025-02-10 NOTE — TELEPHONE ENCOUNTER
Patient receives a prescription for buprenorphine, last filled 2/3, cannot prescribe pain medication

## 2025-02-10 NOTE — ED PROVIDER NOTES
Conditions affecting Care:     EMERGENCY DEPARTMENT COURSE and DIFFERENTIAL DIAGNOSIS/MDM:   Vitals:    Vitals:    02/10/25 0115   BP: (!) 168/144   Pulse: 92   Resp: 16   Temp: 99 °F (37.2 °C)   TempSrc: Oral   SpO2: 99%   Height: 1.499 m (4' 11\")       Patient was given the following medications:  Medications   ibuprofen (ADVIL;MOTRIN) tablet 800 mg (800 mg Oral Given 2/10/25 0307)             Is this patient to be included in the SEP-1 Core Measure due to severe sepsis or septic shock?   No   Exclusion criteria - the patient is NOT to be included for SEP-1 Core Measure due to:  Infection is not suspected    CONSULTS: (Who and What was discussed)  None              CC/HPI Summary, DDx, ED Course, and Reassessment: Patient seen and evaluated. Old records reviewed. Diagnostic testing reviewed and results discussed.      I have independently evaluated this patient based upon my scope of practice. Supervising physician was in the department for consultation as needed.     This is a pleasant 44-year-old female who presents for evaluation of foot injury.  Patient seen and evaluated by myself history obtained from patient.  Exam she is alert oriented appropriately, vital signs stable.  She has tenderness to the left hallux with some soft tissue swelling.  She is distally neurovascularly intact.  No other sign of trauma.  She had X-ray of the left foot which shows a comminuted intra-articular fracture of the first toe proximal phalanx.  She was given ibuprofen in the department, ice.  Patient has history of chronic pain, I reviewed her OARRS, she had buprenorphine filled on February 3, she is telling me that she did not fill the prescription she no longer takes it, I informed the patient that unfortunately I am unable to provide her with a prescription for narcotic pain medicine.  She is going to follow-up with orthopedics.  At this time I do not believe closed reduction would result in any improvement of her anatomic

## 2025-02-12 ENCOUNTER — OFFICE VISIT (OUTPATIENT)
Dept: ORTHOPEDIC SURGERY | Age: 45
End: 2025-02-12
Payer: COMMERCIAL

## 2025-02-12 VITALS — HEIGHT: 59 IN | BODY MASS INDEX: 32.25 KG/M2 | WEIGHT: 160 LBS

## 2025-02-12 DIAGNOSIS — S92.415A CLOSED NONDISPLACED FRACTURE OF PROXIMAL PHALANX OF LEFT GREAT TOE, INITIAL ENCOUNTER: Primary | ICD-10-CM

## 2025-02-12 PROCEDURE — G8417 CALC BMI ABV UP PARAM F/U: HCPCS | Performed by: ORTHOPAEDIC SURGERY

## 2025-02-12 PROCEDURE — 99204 OFFICE O/P NEW MOD 45 MIN: CPT | Performed by: ORTHOPAEDIC SURGERY

## 2025-02-12 PROCEDURE — 4004F PT TOBACCO SCREEN RCVD TLK: CPT | Performed by: ORTHOPAEDIC SURGERY

## 2025-02-12 PROCEDURE — G8427 DOCREV CUR MEDS BY ELIG CLIN: HCPCS | Performed by: ORTHOPAEDIC SURGERY

## 2025-02-12 RX ORDER — TRAMADOL HYDROCHLORIDE 50 MG/1
50 TABLET ORAL EVERY 8 HOURS PRN
Qty: 21 TABLET | Refills: 0 | Status: SHIPPED | OUTPATIENT
Start: 2025-02-12 | End: 2025-02-19
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
## 2025-02-17 ENCOUNTER — PATIENT MESSAGE (OUTPATIENT)
Dept: GYNECOLOGY | Age: 45
End: 2025-02-17

## 2025-02-18 ENCOUNTER — TELEPHONE (OUTPATIENT)
Dept: INTERNAL MEDICINE CLINIC | Age: 45
End: 2025-02-18

## 2025-02-18 NOTE — TELEPHONE ENCOUNTER
Yesterday and last night nausea and throwing up. Zofran doesn't help     Allergies see list      [facility] [phone]

## 2025-02-24 ENCOUNTER — OFFICE VISIT (OUTPATIENT)
Dept: INTERNAL MEDICINE CLINIC | Age: 45
End: 2025-02-24
Payer: COMMERCIAL

## 2025-02-24 VITALS
OXYGEN SATURATION: 95 % | HEART RATE: 85 BPM | SYSTOLIC BLOOD PRESSURE: 122 MMHG | BODY MASS INDEX: 32.25 KG/M2 | HEIGHT: 59 IN | WEIGHT: 160 LBS | DIASTOLIC BLOOD PRESSURE: 74 MMHG

## 2025-02-24 DIAGNOSIS — E78.5 TYPE 2 DIABETES MELLITUS WITH HYPERLIPIDEMIA (HCC): Primary | ICD-10-CM

## 2025-02-24 DIAGNOSIS — E11.69 TYPE 2 DIABETES MELLITUS WITH HYPERLIPIDEMIA (HCC): Primary | ICD-10-CM

## 2025-02-24 DIAGNOSIS — Z12.31 ENCOUNTER FOR SCREENING MAMMOGRAM FOR MALIGNANT NEOPLASM OF BREAST: ICD-10-CM

## 2025-02-24 DIAGNOSIS — G89.29 CHRONIC RIGHT-SIDED LOW BACK PAIN WITH RIGHT-SIDED SCIATICA: ICD-10-CM

## 2025-02-24 DIAGNOSIS — R13.10 DYSPHAGIA, UNSPECIFIED TYPE: ICD-10-CM

## 2025-02-24 DIAGNOSIS — E78.2 MIXED HYPERLIPIDEMIA: ICD-10-CM

## 2025-02-24 DIAGNOSIS — M54.41 CHRONIC RIGHT-SIDED LOW BACK PAIN WITH RIGHT-SIDED SCIATICA: ICD-10-CM

## 2025-02-24 PROCEDURE — G8427 DOCREV CUR MEDS BY ELIG CLIN: HCPCS | Performed by: NURSE PRACTITIONER

## 2025-02-24 PROCEDURE — 3078F DIAST BP <80 MM HG: CPT | Performed by: NURSE PRACTITIONER

## 2025-02-24 PROCEDURE — G2211 COMPLEX E/M VISIT ADD ON: HCPCS | Performed by: NURSE PRACTITIONER

## 2025-02-24 PROCEDURE — 99214 OFFICE O/P EST MOD 30 MIN: CPT | Performed by: NURSE PRACTITIONER

## 2025-02-24 PROCEDURE — 4004F PT TOBACCO SCREEN RCVD TLK: CPT | Performed by: NURSE PRACTITIONER

## 2025-02-24 PROCEDURE — G8417 CALC BMI ABV UP PARAM F/U: HCPCS | Performed by: NURSE PRACTITIONER

## 2025-02-24 PROCEDURE — 2022F DILAT RTA XM EVC RTNOPTHY: CPT | Performed by: NURSE PRACTITIONER

## 2025-02-24 PROCEDURE — 3074F SYST BP LT 130 MM HG: CPT | Performed by: NURSE PRACTITIONER

## 2025-02-24 PROCEDURE — 3046F HEMOGLOBIN A1C LEVEL >9.0%: CPT | Performed by: NURSE PRACTITIONER

## 2025-02-24 RX ORDER — ATORVASTATIN CALCIUM 10 MG/1
10 TABLET, FILM COATED ORAL NIGHTLY
Qty: 90 TABLET | Refills: 1 | Status: SHIPPED | OUTPATIENT
Start: 2025-02-24

## 2025-02-24 ASSESSMENT — ENCOUNTER SYMPTOMS
BACK PAIN: 0
SHORTNESS OF BREATH: 0

## 2025-02-24 NOTE — PROGRESS NOTES
Date: 2/24/2025                                               Subjective/Objective:     Chief Complaint   Patient presents with    Medication Check    Follow-up    Diabetes       HPI    Petrona Green is a 45 yo female, visit today for follow up on chronic medical conditions.    Feels like food is getting stuck in her throat. Has to drink liquids to get it down. No choking.     Fell and broke left great toe. In boot, following with orthopedics.     She is following with pain management, currently treated with buprenorphine.     Chronic low back pain with radiculopathy - she had appointment with Dr Beckman that she missed due to medical emergency with her mom. Gabapentin has been very effective. Having a hard time swallowing the capsules, thinks the tablets would work better.      MRI 10/2023:  IMPRESSION:  No fracture or subluxation in the lumbar spine.  No abnormal enhancement.  No significant disc herniation, spinal canal or foraminal stenosis.    Seizure disorder - was treated with lamictal previously. Denies seizures.       Congential hydrocephalus,  shunt placed as infant. S/p removal  shunt with EVD placement 10/2020 and subsequent reinsertion later 10/2020. These surgeries were completed in [place], [place] and she reports that all local neurosurgeons have told her \"they won't touch her.\"      Gyn: s/p hysterectomy 2016              Last Mammogram: needs              GYN care managed by: Dr Sims          Patient Active Problem List    Diagnosis Date Noted    Multiple drug resistant organism (MDRO) culture positive 02/04/2023    Renal stones 02/04/2023    Seizure-like activity (HCC) 10/27/2022    History of extended-spectrum beta-lactamase producing Escherichia coli infection 10/10/2022    History of creation of ventriculoperitoneal shunt 10/10/2022    Allergy to multiple antibiotics 10/10/2022    Abdominal wall cellulitis 10/09/2022    DM2 (diabetes mellitus, type 2) (HCC) 10/09/2022    HTN (hypertension)

## 2025-03-02 DIAGNOSIS — M54.16 LUMBAR BACK PAIN WITH RADICULOPATHY AFFECTING LEFT LOWER EXTREMITY: ICD-10-CM

## 2025-03-03 DIAGNOSIS — M54.16 LUMBAR BACK PAIN WITH RADICULOPATHY AFFECTING LEFT LOWER EXTREMITY: ICD-10-CM

## 2025-03-03 RX ORDER — GABAPENTIN 400 MG/1
CAPSULE ORAL
Qty: 120 CAPSULE | Refills: 0 | OUTPATIENT
Start: 2025-03-03 | End: 2025-04-03

## 2025-03-03 RX ORDER — GABAPENTIN 400 MG/1
CAPSULE ORAL
Qty: 120 CAPSULE | Refills: 0 | Status: SHIPPED | OUTPATIENT
Start: 2025-03-03 | End: 2025-03-03

## 2025-03-03 NOTE — TELEPHONE ENCOUNTER
Pt called, she states [facility] told her she needs the 800mg tablets    Please advise       Complex Repair And O-T Advancement Flap Text: The defect edges were debeveled with a #15 scalpel blade.  The primary defect was closed partially with a complex linear closure.  Given the location of the remaining defect, shape of the defect and the proximity to free margins an O-T advancement flap was deemed most appropriate for complete closure of the defect.  Using a sterile surgical marker, an appropriate advancement flap was drawn incorporating the defect and placing the expected incisions within the relaxed skin tension lines where possible.    The area thus outlined was incised deep to adipose tissue with a #15 scalpel blade.  The skin margins were undermined to an appropriate distance in all directions utilizing iris scissors.

## 2025-03-03 NOTE — TELEPHONE ENCOUNTER
Medication:   Requested Prescriptions     Pending Prescriptions Disp Refills    gabapentin (NEURONTIN) 400 MG capsule [Pharmacy Med Name: GABAPENTIN 400 MG CAPSULE] 120 capsule 0     Sig: TAKE 1 CAPSULE BY MOUTH EVERY MORNING AND WITH LUNCH, AND TAKE 2 CAPSULES BY MOUTH EVERY NIGHT AT BEDTIME       Last Filled:      Patient Phone Number: [phone] (home)     Last appt: 2/24/2025   Next appt: Visit date not found  Future Appointments   Date Time Provider Department Center   3/26/2025  2:00 PM Scott Sagastume MD [facility]

## 2025-03-03 NOTE — TELEPHONE ENCOUNTER
Pt called can you please resend her Rx for gabapentin (NEURONTIN) 400 MG to the [facility] in [place].    She is also requesting tablets, instead of capsules    Thank you

## 2025-03-03 NOTE — TELEPHONE ENCOUNTER
Future Appointments   Date Time Provider Department Center   3/26/2025  2:00 PM Scott Sagastume MD [facility]       Last appt 2/24/25

## 2025-03-04 ENCOUNTER — TELEPHONE (OUTPATIENT)
Dept: INTERNAL MEDICINE CLINIC | Age: 45
End: 2025-03-04

## 2025-03-04 DIAGNOSIS — M54.16 LUMBAR BACK PAIN WITH RADICULOPATHY AFFECTING LEFT LOWER EXTREMITY: Primary | ICD-10-CM

## 2025-03-04 RX ORDER — GABAPENTIN 800 MG/1
TABLET ORAL
Qty: 60 TABLET | Refills: 0 | Status: SHIPPED | OUTPATIENT
Start: 2025-03-04 | End: 2025-04-03

## 2025-03-04 NOTE — TELEPHONE ENCOUNTER
[facility] doesn't have the 400 mg in tablets, (Gabapentin) but they have the 800 mg in tablets and she can take 1/2 tablets. . At night time she could take 1 whole tablet,.       [facility]  [phone]

## 2025-03-06 NOTE — ED PROVIDER NOTES
[address] [facility]      Pt Name: Ailyn Sandoval  MRN: [id number]  [place] 1980  Date of evaluation: 9/25/2022  Provider: Mesha Jordan MD    [address] [place] [address]       Chief Complaint   Patient presents with    Abdominal Pain    Fever     C/o abd pain with fevers x 9 days. Pt had a laparoscopic hernia repair 11 days ago and was dx with Covid 9 days ago. Reports worsening pain and fever and one near syncopal episode          HISTORY OF PRESENT ILLNESS   (Location/Symptom, Timing/Onset, Context/Setting, Quality, Duration, Modifying Factors, Severity)  Note limiting factors. Ailyn Sandoval is a 38 y.o. female who presents to the emergency department Being of abdominal pain and fevers for 9 days. Patient had a laparoscopic surgery on September 14, 2022. She had a history of chronic abdominal pain and the final diagnosis after the surgery was adhesions and a subcutaneous abdominal mass. She underwent laparoscopic lysis of adhesions and removal of subcutaneous abdominal wall mass measuring 2 cm by Dr. Laura Shahid. The patient subsequently developed COVID. She was immunized with 2 shots but never received a booster. Patient states she really did have any symptoms related to that she had some back pain but does not have that anymore. Patient states that she has nausea and vomiting and decreased urine output because she cannot keep anything down. The patient denies any chest pain or shortness of breath. She does have the nausea vomiting but no diarrhea. She does have the abdominal pain which is on the right side. No objective fever but she does complain of subjective fevers. She does not have a fever here today. Patient has had a previous hysterectomy. Nursing Notes were reviewed.     REVIEW OF SYSTEMS    (2-9 systems for level 4, 10 or more for level 5)     Review of Systems   Constitutional:  Positive for fever (Patient complains of subjective fevers). Negative for chills. HENT:  Negative for congestion and sore throat. Eyes:  Negative for pain and redness. Respiratory:  Negative for cough and shortness of breath. Cardiovascular:  Negative for chest pain. Gastrointestinal:  Positive for abdominal pain, nausea and vomiting. Negative for diarrhea. Genitourinary:  Negative for difficulty urinating and dysuria. Musculoskeletal:  Negative for arthralgias and back pain. Skin:  Negative for color change and rash. Neurological:  Positive for syncope (States she had 1 episode of passing out at home. ). Negative for dizziness, weakness and numbness. Psychiatric/Behavioral:  Negative for agitation and behavioral problems. Except as noted above the remainder of the review of systems was reviewed and negative. PAST MEDICAL HISTORY     Past Medical History:   Diagnosis Date    ADHD (attention deficit hyperactivity disorder) 1988    Depression with anxiety     Diabetes mellitus ([facility] [address]     pre-diabetes    Difficult intubation     airway swelled up    Encounter for imaging to screen for metal prior to MRI 06/01/2021    MRI Conditional Medtronic Non-Programmable shunt model#[id number] implanted 10/30/2020 at [facility]. Normal Mode. 1.5T or 3.0T. ESBL (extended spectrum beta-lactamase) producing bacteria infection 11/06/2019    urine    Functional ovarian cysts 2008    rt ovary cyst x 2 yrs.     Headache(784.0)     migraines    History of blood transfusion     at birth    History of kidney stones     History of PCOS     had hysterectomy in 2016    Hydrocephalus [facility])     Hyperlipidemia     Irritable bowel syndrome 2004    had colonoscopy about 6 yrs ago    Meningitis 19/6980    Neutrophilic leukocytosis     Nicotine dependence     PONV (postoperative nausea and vomiting)     very nauseated and sometimes wakes up with a Migraine--happened once after brain surgery    Primary osteoarthritis of left knee 07/01/2016    S/P cone biopsy of cervix     Scoliosis 1990.s    Seizures ([address]     twice--last one in 2022     (ventriculoperitoneal) shunt status     hydrocephalus f/w Neurosurgeon at [facility]    Wears glasses     reading         SURGICAL HISTORY       Past Surgical History:   Procedure Laterality Date    ABDOMEN SURGERY N/A 2021    REMOVAL OF ABDOMINAL WALL MASS performed by Merline Sachs, MD at [facility]      appendicitis     SECTION  2005    placenta previa    CHOLECYSTECTOMY      COLONOSCOPY  2004    COLPOSCOPY      CSF SHUNT      replaced at age 17    CYSTOSCOPY      stone removal    HEMORRHOID SURGERY      HERNIA REPAIR Bilateral     inguinal    HERNIA REPAIR      hiatal hernia    HYSTERECTOMY, TOTAL ABDOMINAL (CERVIX REMOVED)  2016    TAHBSO, adhesions/PCOS    KNEE ARTHROSCOPY Left 2015    medial meniscectomy, chondroplasty, plica resection    LITHOTRIPSY Bilateral 12/10/2019    OTHER SURGICAL HISTORY  10/19/2016    op lap    VENTRAL HERNIA REPAIR N/A 2022    LAPAROSCOPIC LYSIS OF ADHESIONS AND ABDOMINAL WALL MASS REMOVAL performed by Merline Sachs, MD at [place] [address]      multiple revisions, most recent          CURRENT MEDICATIONS       Previous Medications    ALBUTEROL SULFATE HFA (VENTOLIN HFA) 108 (90 BASE) MCG/ACT INHALER    Inhale 2 puffs into the lungs 4 times daily as needed for Wheezing    BENZONATATE (TESSALON) 100 MG CAPSULE    Take 1 capsule by mouth 3 times daily as needed for Cough    BROMPHENIRAMINE-PSEUDOEPHEDRINE-DM 2-30-10 MG/5ML SYRUP    Take 5 mLs by mouth 4 times daily as needed for Cough or Congestion    GABAPENTIN (NEURONTIN) 400 MG CAPSULE    Take 400 mg by mouth 3 times daily. HYDROXYZINE HCL (ATARAX) 25 MG TABLET    Take 1 tablet by mouth every 8 hours as needed for Anxiety    METHYLPREDNISOLONE (MEDROL DOSEPACK) 4 MG TABLET    Take by mouth.     ONDANSETRON (ZOFRAN ODT) 4 MG DISINTEGRATING TABLET    Take 1 tablet by mouth 4 times daily as needed for Nausea or Vomiting    OXYCODONE (ROXICODONE) 5 MG IMMEDIATE RELEASE TABLET    Take 5 mg by mouth every 6 hours as needed for Pain. PROMETHAZINE (PROMETHEGAN) 25 MG SUPPOSITORY    Place 1 suppository rectally every 6 hours as needed for Nausea    SIMVASTATIN (ZOCOR) 10 MG TABLET    Take 10 mg by mouth daily    VYVANSE 50 MG CAPSULE    Take 1 capsule by mouth every morning for 30 days. Take 50 mg by mouth every morning.        ALLERGIES     Bee venom, Bentyl [dicyclomine hcl], Dicyclomine, Ketorolac tromethamine, Levofloxacin, Maitake, Shiitake mushroom, Sulfa antibiotics, Vancomycin, Zosyn [piperacillin sod-tazobactam so], Adhesive tape, Oxycodone-acetaminophen, Sulfacetamide, Acetaminophen, Butalbital-apap-caff-cod, Ceftaroline, Daptomycin, Fosfomycin tromethamine, Haldol [haloperidol], Ketamine, Methocarbamol, Morphine, Nitrofurantoin, Prochlorperazine, Reglan [metoclopramide], Silicone, and Tazobactam    FAMILY HISTORY       Family History   Problem Relation Age of Onset    High Blood Pressure Mother     Diabetes Mother     High Cholesterol Mother     Depression Mother     Diabetes Maternal Grandmother     High Blood Pressure Maternal Grandmother     High Cholesterol Maternal Grandmother     Heart Disease Maternal Grandfather     High Blood Pressure Maternal Grandfather     Heart Disease Paternal Grandmother     Stroke Paternal Grandfather     Depression Sister     Cirrhosis Father     Rheum Arthritis Neg Hx     Osteoarthritis Neg Hx     Asthma Neg Hx     Breast Cancer Neg Hx     Cancer Neg Hx     Heart Failure Neg Hx     Hypertension Neg Hx     Migraines Neg Hx     Ovarian Cancer Neg Hx     Rashes/Skin Problems Neg Hx     Seizures Neg Hx     Thyroid Disease Neg Hx           SOCIAL HISTORY       Social History     Socioeconomic History    Marital status: Single     Spouse name: None    Number of children: None    Years of education: None    Highest education level: None   Occupational History    Occupation: disability, SSI    Tobacco Use    Smoking status: Every Day     Packs/day: 0.50     Years: 18.00     Pack years: 9.00     Types: Cigarettes    Smokeless tobacco: Never   Vaping Use    Vaping Use: Never used   Substance and Sexual Activity    Alcohol use: No     Alcohol/week: 0.0 standard drinks    Drug use: Never    Sexual activity: Not Currently     Partners: Male       SCREENINGS         Aden Coma Scale  Eye Opening: Spontaneous  Best Verbal Response: Oriented  Best Motor Response: Obeys commands  Aden Coma Scale Score: 15                     CIWA Assessment  BP: 127/87  Heart Rate: (!) 112                 PHYSICAL EXAM    (up to 7 for level 4, 8 or more for level 5)     ED Triage Vitals [09/25/22 1412]   BP Temp Temp Source Heart Rate Resp SpO2 Height Weight   127/87 98.5 °F (36.9 °C) Oral (!) 112 18 99 % -- 151 lb 10.8 oz (68.8 kg)       Physical Exam  Constitutional:       General: She is not in acute distress. Appearance: She is well-developed. She is not ill-appearing, toxic-appearing or diaphoretic. HENT:      Head: Normocephalic and atraumatic. Mouth/Throat:      Mouth: Mucous membranes are moist.      Pharynx: Oropharynx is clear. Eyes:      Extraocular Movements: Extraocular movements intact. Pupils: Pupils are equal, round, and reactive to light. Cardiovascular:      Rate and Rhythm: Normal rate and regular rhythm. Pulmonary:      Effort: Pulmonary effort is normal.      Breath sounds: Normal breath sounds. Abdominal:      General: Bowel sounds are decreased. Palpations: Abdomen is soft. Tenderness: There is abdominal tenderness (Patient has generalized tenderness but maximally tender in the right upper quadrant. No masses were palpated is not really that tender in the right lower abdomen.   The patient has no evidence of infection at her surgical incision site in the lower abdomen) in the right upper quadrant. Hernia: No hernia is present. Skin:     General: Skin is warm and dry. Capillary Refill: Capillary refill takes less than 2 seconds. Neurological:      General: No focal deficit present. Mental Status: She is alert. She is disoriented. Psychiatric:         Mood and Affect: Mood normal.         Behavior: Behavior normal.       DIAGNOSTIC RESULTS       RADIOLOGY:   Non-plain film images such as CT, Ultrasound and MRI are read by the radiologist. Plain radiographic images are visualized and preliminarily interpreted by the emergency physician with the below findings:        Interpretation per the Radiologist below, if available at the time of this note:    CT ABDOMEN PELVIS W IV CONTRAST Additional Contrast? None   Final Result   No acute abnormality. Stable tiny right renal stone. CT ABDOMEN PELVIS WO CONTRAST Additional Contrast? None   Final Result   No acute abnormality. Stable 2 mm right renal stone. XR CHEST (2 VW)   Final Result   No acute cardiopulmonary abnormality.                   ED BEDSIDE ULTRASOUND:   Performed by ED Physician - none    LABS:  Labs Reviewed   CBC WITH AUTO DIFFERENTIAL - Abnormal; Notable for the following components:       Result Value    WBC 14.8 (*)     RBC 5.48 (*)     RDW 15.7 (*)     Neutrophils Absolute 12.3 (*)     All other components within normal limits   COMPREHENSIVE METABOLIC PANEL W/ REFLEX TO MG FOR LOW K - Abnormal; Notable for the following components:    Glucose 122 (*)     Albumin 5.1 (*)     Alkaline Phosphatase 171 (*)     All other components within normal limits   LACTIC ACID - Abnormal; Notable for the following components:    Lactic Acid 2.2 (*)     All other components within normal limits   CULTURE, BLOOD 1   CULTURE, BLOOD 2   LIPASE   URINALYSIS WITH REFLEX TO CULTURE   LACTATE, SEPSIS   LACTATE, SEPSIS   LACTATE, SEPSIS   LACTATE, SEPSIS       All other labs were within normal range or not returned as of this dictation. EMERGENCY DEPARTMENT COURSE and DIFFERENTIAL DIAGNOSIS/MDM:   Vitals:    Vitals:    09/25/22 1412   BP: 127/87   Pulse: (!) 112   Resp: 18   Temp: 98.5 °F (36.9 °C)   TempSrc: Oral   SpO2: 99%   Weight: 151 lb 10.8 oz (68.8 kg)         Is this patient to be included in the SEP-1 Core Measure due to severe sepsis or septic shock? No   Exclusion criteria - the patient is NOT to be included for SEP-1 Core Measure due to:  2+ SIRS criteria are not met     MDM  Number of Diagnoses or Management Options  Diagnosis management comments: Patient was discussed with Dr. Dilma Vizcaino who was covering for Dr. Jessi Raphael that we both agreed that the patient should be admitted to the hospitalist.  At this point there is nothing surgical.  The patient was given 30/kg of fluids as well as cefepime and I did a consult to the pharmacy and they confirmed that she had been on cefepime in the past and felt comfortable using that again. Patient has so many different allergies. The exact etiology of her elevated white blood cell count as well as her elevated lactate or not clear. The patient will be admitted to the hospitalist          REASSESSMENT        Patient was reassessed multiple times she appeared in no distress. And was felt to be safe for admission to the floor  FINAL IMPRESSION      1. Sepsis without acute organ dysfunction, due to unspecified organism [facility])          DISPOSITION/PLAN   DISPOSITION    Admit      PATIENT REFERRED TO:  No follow-up provider specified. DISCHARGE MEDICATIONS:  New Prescriptions    No medications on file     Controlled Substances Monitoring:     RX Monitoring 11/9/2021   Attestation -   Periodic Controlled Substance Monitoring Possible medication side effects, risk of tolerance/dependence & alternative treatments discussed. ;No signs of potential drug abuse or diversion identified.        (Please note that portions of this note were completed with a voice recognition program.  Efforts 06-Mar-2025 03:16

## 2025-03-10 ENCOUNTER — TELEPHONE (OUTPATIENT)
Dept: ORTHOPEDIC SURGERY | Age: 45
End: 2025-03-10

## 2025-03-10 NOTE — TELEPHONE ENCOUNTER
Received call from Kandice from [place] [employer] Drug Utilization review. She had questions re: RX for Ultram given for treatment of pain due to fracture.    Answered all questions

## 2025-03-12 ENCOUNTER — OFFICE VISIT (OUTPATIENT)
Dept: GYNECOLOGY | Age: 45
End: 2025-03-12
Payer: COMMERCIAL

## 2025-03-12 VITALS — BODY MASS INDEX: 32.3 KG/M2 | SYSTOLIC BLOOD PRESSURE: 140 MMHG | WEIGHT: 160 LBS | DIASTOLIC BLOOD PRESSURE: 88 MMHG

## 2025-03-12 DIAGNOSIS — R87.610 ATYPICAL SQUAMOUS CELLS OF UNDETERMINED SIGNIFICANCE ON CYTOLOGIC SMEAR OF CERVIX (ASC-US): Primary | ICD-10-CM

## 2025-03-12 PROCEDURE — 3079F DIAST BP 80-89 MM HG: CPT | Performed by: OBSTETRICS & GYNECOLOGY

## 2025-03-12 PROCEDURE — 99396 PREV VISIT EST AGE 40-64: CPT | Performed by: OBSTETRICS & GYNECOLOGY

## 2025-03-12 PROCEDURE — 3077F SYST BP >= 140 MM HG: CPT | Performed by: OBSTETRICS & GYNECOLOGY

## 2025-03-12 NOTE — PROGRESS NOTES
Subjective:      Patient ID: Petrona Green is a 44 y.o. female.    HPI  pts here for annual gyn exam.  H/o hysterectomy.  Pts sad bc she broke her left big toe and found out her mom has uterine cancer.  Her mom doesn't want to see a doctor.  Due for mammogram and pap.    Review of Systems Pertinent review of systems items discussed above.  All others systems items not discussed above were negative.      Objective:   Physical Exam  Constitutional:       Appearance: She is well-developed.   HENT:      Head: Normocephalic and atraumatic.   Neck:      Thyroid: No thyromegaly.      Trachea: No tracheal deviation.   Cardiovascular:      Rate and Rhythm: Normal rate and regular rhythm.      Heart sounds: Normal heart sounds. No murmur heard.  Pulmonary:      Effort: Pulmonary effort is normal. No respiratory distress.      Breath sounds: Normal breath sounds. No wheezing or rales.   Chest:   Breasts:     Right: No mass, nipple discharge or skin change.      Left: No mass, nipple discharge or skin change.   Abdominal:      General: There is no distension.      Palpations: Abdomen is soft. There is no mass.      Tenderness: There is no abdominal tenderness. There is no rebound.   Genitourinary:     Labia:         Right: No lesion.         Left: No lesion.       Vagina: Normal. No foreign body. No vaginal discharge.      Adnexa:         Right: No mass or tenderness.          Left: No mass or tenderness.        Rectum: Normal. Guaiac result negative. No mass or external hemorrhoid.      Comments: Pap performed.   Uterus and cx absent.  Musculoskeletal:         General: Normal range of motion.   Lymphadenopathy:      Cervical: No cervical adenopathy.   Neurological:      Mental Status: She is alert and oriented to person, place, and time.         Assessment:   Normal gyn exam     Plan:   Call with results.  F/u annual gyn exam.  Will refer mom to Dr Eunice Sims MD

## 2025-03-14 LAB
HPV HR 12 DNA SPEC QL NAA+PROBE: DETECTED
HPV16 DNA SPEC QL NAA+PROBE: NOT DETECTED
HPV16+18+H RISK 12 DNA SPEC-IMP: ABNORMAL
HPV18 DNA SPEC QL NAA+PROBE: NOT DETECTED

## 2025-03-18 ENCOUNTER — RESULTS FOLLOW-UP (OUTPATIENT)
Dept: GYNECOLOGY | Age: 45
End: 2025-03-18

## 2025-03-30 DIAGNOSIS — M54.16 LUMBAR BACK PAIN WITH RADICULOPATHY AFFECTING LEFT LOWER EXTREMITY: ICD-10-CM

## 2025-03-31 RX ORDER — GABAPENTIN 800 MG/1
TABLET ORAL
Qty: 60 TABLET | Refills: 2 | Status: SHIPPED | OUTPATIENT
Start: 2025-03-31 | End: 2025-06-29

## 2025-03-31 NOTE — TELEPHONE ENCOUNTER
Future Appointments   Date Time Provider Department Center   7/1/2025  1:00 PM Prince Sims MD [facility] GYN [facility]

## 2025-04-03 ENCOUNTER — PATIENT MESSAGE (OUTPATIENT)
Dept: INTERNAL MEDICINE CLINIC | Age: 45
End: 2025-04-03

## 2025-04-03 NOTE — TELEPHONE ENCOUNTER
Please call pharmacy - she takes two total tablets a day (1/2 AM 1/2 lunch and full PM) this is quantity of 60 for 30 day supply

## 2025-04-09 ENCOUNTER — TELEPHONE (OUTPATIENT)
Dept: INTERNAL MEDICINE CLINIC | Age: 45
End: 2025-04-09

## 2025-04-28 NOTE — PROGRESS NOTES
D: pt is nauseated at this time and is having abdominal pain. pt is having sharp pain in R mid upper quadrant radiating down to lower quadrant. no nausea meds ordered (pt states that phenergan works better for her) and she said that oxy is not helping with pain. Please advise.  A: this RN sent secure message to Dr. Kenneth Molina with communication and assessment findings R: waiting on response from physician No

## 2025-05-05 NOTE — ED NOTES
Pt ambulated to restroom without problem.       Peyton Mclean RN  07/21/21 4758
Pt states that she has a lipoma removed last week from her lower RQ, she state increased pain fever 101 and vomiting , she states she took half of norco for pain and fever.  She states she called Dr. Kwadwo Montes De [facility] office she states they told her to come to the Gabino Johnston RN  07/21/21 3895
No

## 2025-05-19 ENCOUNTER — OFFICE VISIT (OUTPATIENT)
Dept: ORTHOPEDIC SURGERY | Age: 45
End: 2025-05-19
Payer: COMMERCIAL

## 2025-05-19 VITALS — HEIGHT: 59 IN | BODY MASS INDEX: 32.25 KG/M2 | WEIGHT: 160 LBS

## 2025-05-19 DIAGNOSIS — S92.415A CLOSED NONDISPLACED FRACTURE OF PROXIMAL PHALANX OF LEFT GREAT TOE, INITIAL ENCOUNTER: Primary | ICD-10-CM

## 2025-05-19 PROCEDURE — G8417 CALC BMI ABV UP PARAM F/U: HCPCS | Performed by: ORTHOPAEDIC SURGERY

## 2025-05-19 PROCEDURE — 4004F PT TOBACCO SCREEN RCVD TLK: CPT | Performed by: ORTHOPAEDIC SURGERY

## 2025-05-19 PROCEDURE — G8427 DOCREV CUR MEDS BY ELIG CLIN: HCPCS | Performed by: ORTHOPAEDIC SURGERY

## 2025-05-19 PROCEDURE — 99213 OFFICE O/P EST LOW 20 MIN: CPT | Performed by: ORTHOPAEDIC SURGERY

## 2025-05-19 NOTE — PROGRESS NOTES
Present (8/25/2022)    Received from [facility], [facility]    [employer] - Occupational Stress Questionnaire     Feeling of Stress : Very much   Social Connections: Socially Isolated (8/25/2022)    Received from [facility], [facility]    Social Connection and Isolation Panel [NHANES]     Frequency of Communication with Friends and Family: More than three times a week     Frequency of Social Gatherings with Friends and Family: More than three times a week     Attends Hoahaoism Services: Never     Active Member of Clubs or Organizations: No     Attends Club or Organization Meetings: Never     Marital Status:    Intimate Partner Violence: Unknown (1/18/2024)    [facility] from [facility]    Interpersonal Safety     Feel physically or emotionally unsafe where currently live: Not on file     Harm by anyone: Not on file     Emotionally Harmed: Not on file   Housing Stability: Low Risk  (11/28/2024)    Housing Stability Vital Sign     Unable to Pay for Housing in the Last Year: No     Number of Times Moved in the Last Year: 1     Homeless in the Last Year: No       Family History   Problem Relation Age of Onset    High Blood Pressure Mother     Diabetes Mother     High Cholesterol Mother     Depression Mother     Heart Failure Mother     COPD Mother     Cirrhosis Father     Depression Sister     Broken Bones Sister         2005    Scoliosis Sister         My other sister had spinal fusion surgery    Scoliosis Sister     Diabetes Maternal Grandmother     High Blood Pressure Maternal Grandmother     High Cholesterol Maternal Grandmother     Heart Disease Maternal Grandfather     High Blood Pressure Maternal Grandfather     Heart Disease Paternal Grandmother     Stroke Paternal Grandfather     Rheum Arthritis Neg Hx     Osteoarthritis Neg Hx     Asthma Neg Hx     Breast Cancer Neg Hx

## 2025-05-28 ENCOUNTER — TELEPHONE (OUTPATIENT)
Dept: INTERNAL MEDICINE CLINIC | Age: 45
End: 2025-05-28

## 2025-05-28 DIAGNOSIS — M54.16 LUMBAR BACK PAIN WITH RADICULOPATHY AFFECTING LEFT LOWER EXTREMITY: ICD-10-CM

## 2025-05-28 RX ORDER — GABAPENTIN 800 MG/1
400 TABLET ORAL 3 TIMES DAILY
Qty: 45 TABLET | Refills: 0 | Status: SHIPPED | OUTPATIENT
Start: 2025-05-28 | End: 2025-06-27

## 2025-05-28 NOTE — TELEPHONE ENCOUNTER
Pt called, she is leaving for vacation tonight, [place] for 2 weeks. Wants to know if she would be able to  her gabapentin (NEURONTIN) 800 MG tablet early?    Please advise    Thank you

## 2025-06-27 ENCOUNTER — TELEPHONE (OUTPATIENT)
Dept: INTERNAL MEDICINE CLINIC | Age: 45
End: 2025-06-27

## 2025-06-30 NOTE — PATIENT INSTRUCTIONS
Start steroid  Xray of thumb at [facility]   Complete fasting blood work. Nothing to eat or drink besides water or black coffee for 8 hours prior to blood work.   Mammogram [phone]

## 2025-06-30 NOTE — PROGRESS NOTES
and Anxiety    Methocarbamol Rash    Prochlorperazine Anxiety     Patient tells me she takes phenergan    Reglan [Metoclopramide] Anxiety    Silicone Hives, Swelling and Rash       Review of Systems   Constitutional:  Negative for chills and fever.   Respiratory:  Negative for shortness of breath.    Cardiovascular:  Negative for chest pain.   Endocrine: Negative for polydipsia, polyphagia and polyuria.   Musculoskeletal:  Positive for arthralgias.        Right thumb pain and swelling       Vitals:  /74 (BP Site: Right Upper Arm, Patient Position: Sitting, BP Cuff Size: Medium Adult)   Pulse 74   Ht 1.499 m (4' 11.02\")   Wt 73 kg (161 lb)   LMP 11/13/2016   SpO2 93%   BMI 32.50 kg/m²     Physical Exam  Vitals reviewed.   Constitutional:       General: She is not in acute distress.  Cardiovascular:      Rate and Rhythm: Normal rate and regular rhythm.      Heart sounds: Normal heart sounds.   Pulmonary:      Effort: Pulmonary effort is normal.   Abdominal:      General: Bowel sounds are normal.      Palpations: Abdomen is soft.      Tenderness: There is no abdominal tenderness.   Musculoskeletal:      Left hand: Swelling present. No tenderness or bony tenderness. Decreased range of motion. Normal pulse.      Comments: Swelling base of right thumb   Skin:     General: Skin is warm and dry.   Neurological:      Mental Status: She is alert and oriented to person, place, and time.   Psychiatric:         Behavior: Behavior normal.         Visual inspection:  Deformity/amputation: absent  Skin lesions/pre-ulcerative calluses: absent  Edema: right- negative, left- negative    Sensory exam:  Monofilament sensation: normal  (minimum of 5 random plantar locations tested, avoiding callused areas - > 1 area with absence of sensation is + for neuropathy)    Plus at least one of the following:  Pulses: normal,           Assessment/Plan     1. Type 2 diabetes mellitus with hyperglycemia, without long-term current use of

## 2025-07-01 ENCOUNTER — OFFICE VISIT (OUTPATIENT)
Dept: INTERNAL MEDICINE CLINIC | Age: 45
End: 2025-07-01
Payer: COMMERCIAL

## 2025-07-01 VITALS
SYSTOLIC BLOOD PRESSURE: 110 MMHG | WEIGHT: 161 LBS | HEIGHT: 59 IN | HEART RATE: 74 BPM | DIASTOLIC BLOOD PRESSURE: 74 MMHG | BODY MASS INDEX: 32.46 KG/M2 | OXYGEN SATURATION: 93 %

## 2025-07-01 DIAGNOSIS — E78.2 MIXED HYPERLIPIDEMIA: ICD-10-CM

## 2025-07-01 DIAGNOSIS — M79.644 CHRONIC PAIN OF RIGHT THUMB: ICD-10-CM

## 2025-07-01 DIAGNOSIS — G89.29 CHRONIC PAIN OF RIGHT THUMB: ICD-10-CM

## 2025-07-01 DIAGNOSIS — Q03.9 CONGENITAL HYDROCEPHALUS (HCC): ICD-10-CM

## 2025-07-01 DIAGNOSIS — M54.16 LUMBAR BACK PAIN WITH RADICULOPATHY AFFECTING LEFT LOWER EXTREMITY: ICD-10-CM

## 2025-07-01 DIAGNOSIS — E11.65 TYPE 2 DIABETES MELLITUS WITH HYPERGLYCEMIA, WITHOUT LONG-TERM CURRENT USE OF INSULIN (HCC): Primary | ICD-10-CM

## 2025-07-01 DIAGNOSIS — R56.9 CONVULSIONS, UNSPECIFIED CONVULSION TYPE (HCC): ICD-10-CM

## 2025-07-01 PROCEDURE — G8417 CALC BMI ABV UP PARAM F/U: HCPCS | Performed by: NURSE PRACTITIONER

## 2025-07-01 PROCEDURE — 2022F DILAT RTA XM EVC RTNOPTHY: CPT | Performed by: NURSE PRACTITIONER

## 2025-07-01 PROCEDURE — 3078F DIAST BP <80 MM HG: CPT | Performed by: NURSE PRACTITIONER

## 2025-07-01 PROCEDURE — 4004F PT TOBACCO SCREEN RCVD TLK: CPT | Performed by: NURSE PRACTITIONER

## 2025-07-01 PROCEDURE — 3046F HEMOGLOBIN A1C LEVEL >9.0%: CPT | Performed by: NURSE PRACTITIONER

## 2025-07-01 PROCEDURE — 3074F SYST BP LT 130 MM HG: CPT | Performed by: NURSE PRACTITIONER

## 2025-07-01 PROCEDURE — 99214 OFFICE O/P EST MOD 30 MIN: CPT | Performed by: NURSE PRACTITIONER

## 2025-07-01 PROCEDURE — G8427 DOCREV CUR MEDS BY ELIG CLIN: HCPCS | Performed by: NURSE PRACTITIONER

## 2025-07-01 RX ORDER — CLONIDINE HYDROCHLORIDE 0.1 MG/1
TABLET ORAL
COMMUNITY
Start: 2025-06-02

## 2025-07-01 RX ORDER — HYDROXYZINE PAMOATE 50 MG/1
CAPSULE ORAL
COMMUNITY
Start: 2025-06-02

## 2025-07-01 RX ORDER — METHYLPREDNISOLONE 4 MG/1
TABLET ORAL
Qty: 1 KIT | Refills: 0 | Status: SHIPPED | OUTPATIENT
Start: 2025-07-01 | End: 2025-07-07

## 2025-07-01 ASSESSMENT — PATIENT HEALTH QUESTIONNAIRE - PHQ9
1. LITTLE INTEREST OR PLEASURE IN DOING THINGS: NOT AT ALL
SUM OF ALL RESPONSES TO PHQ QUESTIONS 1-9: 0
2. FEELING DOWN, DEPRESSED OR HOPELESS: NOT AT ALL
SUM OF ALL RESPONSES TO PHQ QUESTIONS 1-9: 0

## 2025-07-01 ASSESSMENT — ENCOUNTER SYMPTOMS: SHORTNESS OF BREATH: 0

## 2025-07-14 DIAGNOSIS — M54.16 LUMBAR BACK PAIN WITH RADICULOPATHY AFFECTING LEFT LOWER EXTREMITY: ICD-10-CM

## 2025-07-14 RX ORDER — GABAPENTIN 800 MG/1
800 TABLET ORAL 3 TIMES DAILY
Qty: 90 TABLET | Refills: 2 | Status: SHIPPED | OUTPATIENT
Start: 2025-07-14 | End: 2025-10-12

## 2025-07-14 NOTE — TELEPHONE ENCOUNTER
Pt called she is requesting a refill for her   gabapentin (NEURONTIN     [facility] [address]      Thank you

## 2025-08-06 ENCOUNTER — HOSPITAL ENCOUNTER (INPATIENT)
Age: 45
LOS: 2 days | Discharge: HOME | DRG: 248 | End: 2025-08-09
Attending: INTERNAL MEDICINE | Admitting: INTERNAL MEDICINE
Payer: COMMERCIAL

## 2025-08-06 DIAGNOSIS — N17.9 ACUTE KIDNEY INJURY: ICD-10-CM

## 2025-08-06 DIAGNOSIS — E83.42 HYPOMAGNESEMIA: ICD-10-CM

## 2025-08-06 DIAGNOSIS — E86.0 DEHYDRATION: ICD-10-CM

## 2025-08-06 DIAGNOSIS — K52.9 COLITIS: ICD-10-CM

## 2025-08-06 DIAGNOSIS — R11.2 INTRACTABLE NAUSEA AND VOMITING: Primary | ICD-10-CM

## 2025-08-06 PROCEDURE — 99285 EMERGENCY DEPT VISIT HI MDM: CPT

## 2025-08-06 ASSESSMENT — PAIN DESCRIPTION - FREQUENCY: FREQUENCY: CONTINUOUS

## 2025-08-06 ASSESSMENT — PAIN DESCRIPTION - ORIENTATION: ORIENTATION: LOWER

## 2025-08-06 ASSESSMENT — PAIN DESCRIPTION - PAIN TYPE: TYPE: ACUTE PAIN

## 2025-08-06 ASSESSMENT — PAIN SCALES - GENERAL: PAINLEVEL_OUTOF10: 8

## 2025-08-06 ASSESSMENT — PAIN - FUNCTIONAL ASSESSMENT: PAIN_FUNCTIONAL_ASSESSMENT: ACTIVITIES ARE NOT PREVENTED

## 2025-08-06 ASSESSMENT — PAIN DESCRIPTION - DESCRIPTORS: DESCRIPTORS: ACHING

## 2025-08-06 ASSESSMENT — PAIN DESCRIPTION - ONSET: ONSET: ON-GOING

## 2025-08-06 ASSESSMENT — PAIN DESCRIPTION - LOCATION: LOCATION: BACK

## 2025-08-07 ENCOUNTER — APPOINTMENT (OUTPATIENT)
Dept: CT IMAGING | Age: 45
DRG: 248 | End: 2025-08-07
Payer: COMMERCIAL

## 2025-08-07 PROBLEM — R73.9 HYPERGLYCEMIA: Status: ACTIVE | Noted: 2025-08-07

## 2025-08-07 PROBLEM — E83.42 HYPOMAGNESEMIA: Status: ACTIVE | Noted: 2025-08-07

## 2025-08-07 PROBLEM — F41.1 GAD (GENERALIZED ANXIETY DISORDER): Status: ACTIVE | Noted: 2025-08-07

## 2025-08-07 PROBLEM — K52.9 COLITIS PRESUMED TO BE DUE TO INFECTION: Status: ACTIVE | Noted: 2025-08-07

## 2025-08-07 LAB
ALBUMIN SERPL-MCNC: 5 G/DL (ref 3.4–5)
ALBUMIN/GLOB SERPL: 1.4 {RATIO} (ref 1.1–2.2)
ALP SERPL-CCNC: 88 U/L (ref 40–129)
ALT SERPL-CCNC: 18 U/L (ref 10–40)
ANION GAP SERPL CALCULATED.3IONS-SCNC: 21 MMOL/L (ref 3–16)
AST SERPL-CCNC: 24 U/L (ref 15–37)
BASOPHILS # BLD: 0.1 K/UL (ref 0–0.2)
BASOPHILS NFR BLD: 0.3 %
BILIRUB SERPL-MCNC: 0.6 MG/DL (ref 0–1)
BUN SERPL-MCNC: 27 MG/DL (ref 7–20)
CALCIUM SERPL-MCNC: 10.8 MG/DL (ref 8.3–10.6)
CHLORIDE SERPL-SCNC: 95 MMOL/L (ref 99–110)
CHOLEST SERPL-MCNC: 190 MG/DL (ref 0–199)
CO2 SERPL-SCNC: 22 MMOL/L (ref 21–32)
CREAT SERPL-MCNC: 1.2 MG/DL (ref 0.6–1.1)
DEPRECATED RDW RBC AUTO: 13.3 % (ref 12.4–15.4)
DEPRECATED RDW RBC AUTO: 13.5 % (ref 12.4–15.4)
EOSINOPHIL # BLD: 0 K/UL (ref 0–0.6)
EOSINOPHIL NFR BLD: 0.1 %
EST. AVERAGE GLUCOSE BLD GHB EST-MCNC: 119.8 MG/DL
GFR SERPLBLD CREATININE-BSD FMLA CKD-EPI: 58 ML/MIN/{1.73_M2}
GLUCOSE SERPL-MCNC: 182 MG/DL (ref 70–99)
HBA1C MFR BLD: 5.8 %
HCT VFR BLD AUTO: 45.1 % (ref 36–48)
HCT VFR BLD AUTO: 52.1 % (ref 36–48)
HDLC SERPL-MCNC: 26 MG/DL (ref 40–60)
HGB BLD-MCNC: 15.9 G/DL (ref 12–16)
HGB BLD-MCNC: 18.2 G/DL (ref 12–16)
LACTATE BLDV-SCNC: 1.6 MMOL/L (ref 0.4–2)
LACTATE BLDV-SCNC: 2.1 MMOL/L (ref 0.4–2)
LDL CHOLESTEROL: 117 MG/DL
LIPASE SERPL-CCNC: 13 U/L (ref 13–60)
LYMPHOCYTES # BLD: 1.3 K/UL (ref 1–5.1)
LYMPHOCYTES NFR BLD: 6.7 %
MAGNESIUM SERPL-MCNC: 1.49 MG/DL (ref 1.8–2.4)
MAGNESIUM SERPL-MCNC: 2.29 MG/DL (ref 1.8–2.4)
MCH RBC QN AUTO: 29.1 PG (ref 26–34)
MCH RBC QN AUTO: 29.4 PG (ref 26–34)
MCHC RBC AUTO-ENTMCNC: 34.9 G/DL (ref 31–36)
MCHC RBC AUTO-ENTMCNC: 35.1 G/DL (ref 31–36)
MCV RBC AUTO: 82.7 FL (ref 80–100)
MCV RBC AUTO: 84.2 FL (ref 80–100)
MONOCYTES # BLD: 0.5 K/UL (ref 0–1.3)
MONOCYTES NFR BLD: 2.5 %
NEUTROPHILS # BLD: 17 K/UL (ref 1.7–7.7)
NEUTROPHILS NFR BLD: 90.4 %
PLATELET # BLD AUTO: 241 K/UL (ref 135–450)
PLATELET # BLD AUTO: 265 K/UL (ref 135–450)
PLATELET BLD QL SMEAR: ADEQUATE
PMV BLD AUTO: 8 FL (ref 5–10.5)
PMV BLD AUTO: 9 FL (ref 5–10.5)
POTASSIUM SERPL-SCNC: 4.1 MMOL/L (ref 3.5–5.1)
PROT SERPL-MCNC: 8.5 G/DL (ref 6.4–8.2)
RBC # BLD AUTO: 5.45 M/UL (ref 4–5.2)
RBC # BLD AUTO: 6.18 M/UL (ref 4–5.2)
RBC MORPH BLD: NORMAL
SARS-COV-2 RDRP RESP QL NAA+PROBE: NOT DETECTED
SLIDE REVIEW: ABNORMAL
SODIUM SERPL-SCNC: 138 MMOL/L (ref 136–145)
TRIGL SERPL-MCNC: 235 MG/DL (ref 0–150)
TSH SERPL DL<=0.005 MIU/L-ACNC: 1.34 UIU/ML (ref 0.27–4.2)
VLDLC SERPL CALC-MCNC: 47 MG/DL
WBC # BLD AUTO: 12.1 K/UL (ref 4–11)
WBC # BLD AUTO: 18.8 K/UL (ref 4–11)

## 2025-08-07 PROCEDURE — 85025 COMPLETE CBC W/AUTO DIFF WBC: CPT

## 2025-08-07 PROCEDURE — 96375 TX/PRO/DX INJ NEW DRUG ADDON: CPT

## 2025-08-07 PROCEDURE — 85027 COMPLETE CBC AUTOMATED: CPT

## 2025-08-07 PROCEDURE — 87635 SARS-COV-2 COVID-19 AMP PRB: CPT

## 2025-08-07 PROCEDURE — 6360000002 HC RX W HCPCS: Performed by: FAMILY MEDICINE

## 2025-08-07 PROCEDURE — 2580000003 HC RX 258: Performed by: PHYSICIAN ASSISTANT

## 2025-08-07 PROCEDURE — 83735 ASSAY OF MAGNESIUM: CPT

## 2025-08-07 PROCEDURE — 83605 ASSAY OF LACTIC ACID: CPT

## 2025-08-07 PROCEDURE — 6370000000 HC RX 637 (ALT 250 FOR IP): Performed by: INTERNAL MEDICINE

## 2025-08-07 PROCEDURE — 83690 ASSAY OF LIPASE: CPT

## 2025-08-07 PROCEDURE — 36415 COLL VENOUS BLD VENIPUNCTURE: CPT

## 2025-08-07 PROCEDURE — 94760 N-INVAS EAR/PLS OXIMETRY 1: CPT

## 2025-08-07 PROCEDURE — 84443 ASSAY THYROID STIM HORMONE: CPT

## 2025-08-07 PROCEDURE — 6360000004 HC RX CONTRAST MEDICATION: Performed by: PHYSICIAN ASSISTANT

## 2025-08-07 PROCEDURE — 83036 HEMOGLOBIN GLYCOSYLATED A1C: CPT

## 2025-08-07 PROCEDURE — 96365 THER/PROPH/DIAG IV INF INIT: CPT

## 2025-08-07 PROCEDURE — 74177 CT ABD & PELVIS W/CONTRAST: CPT

## 2025-08-07 PROCEDURE — 80061 LIPID PANEL: CPT

## 2025-08-07 PROCEDURE — 6370000000 HC RX 637 (ALT 250 FOR IP)

## 2025-08-07 PROCEDURE — 1200000000 HC SEMI PRIVATE

## 2025-08-07 PROCEDURE — 2500000003 HC RX 250 WO HCPCS: Performed by: INTERNAL MEDICINE

## 2025-08-07 PROCEDURE — 80053 COMPREHEN METABOLIC PANEL: CPT

## 2025-08-07 PROCEDURE — 6360000002 HC RX W HCPCS: Performed by: INTERNAL MEDICINE

## 2025-08-07 PROCEDURE — 6360000002 HC RX W HCPCS: Performed by: PHYSICIAN ASSISTANT

## 2025-08-07 RX ORDER — CIPROFLOXACIN 2 MG/ML
400 INJECTION, SOLUTION INTRAVENOUS EVERY 12 HOURS
Status: DISCONTINUED | OUTPATIENT
Start: 2025-08-07 | End: 2025-08-09

## 2025-08-07 RX ORDER — DIPHENHYDRAMINE HCL 25 MG
25 TABLET ORAL EVERY 6 HOURS PRN
Status: COMPLETED | OUTPATIENT
Start: 2025-08-07 | End: 2025-08-09

## 2025-08-07 RX ORDER — BUPRENORPHINE 8 MG/1
8 TABLET SUBLINGUAL 3 TIMES DAILY
Status: DISCONTINUED | OUTPATIENT
Start: 2025-08-07 | End: 2025-08-09 | Stop reason: HOSPADM

## 2025-08-07 RX ORDER — ONDANSETRON 4 MG/1
4 TABLET, ORALLY DISINTEGRATING ORAL EVERY 8 HOURS PRN
Status: DISCONTINUED | OUTPATIENT
Start: 2025-08-07 | End: 2025-08-09 | Stop reason: HOSPADM

## 2025-08-07 RX ORDER — ENOXAPARIN SODIUM 100 MG/ML
40 INJECTION SUBCUTANEOUS DAILY
Status: DISCONTINUED | OUTPATIENT
Start: 2025-08-07 | End: 2025-08-09 | Stop reason: HOSPADM

## 2025-08-07 RX ORDER — GABAPENTIN 400 MG/1
800 CAPSULE ORAL 3 TIMES DAILY
Status: DISCONTINUED | OUTPATIENT
Start: 2025-08-07 | End: 2025-08-09 | Stop reason: HOSPADM

## 2025-08-07 RX ORDER — HYOSCYAMINE SULFATE 0.12 MG/1
0.12 TABLET SUBLINGUAL EVERY 4 HOURS PRN
Status: DISCONTINUED | OUTPATIENT
Start: 2025-08-07 | End: 2025-08-09 | Stop reason: HOSPADM

## 2025-08-07 RX ORDER — SODIUM CHLORIDE 0.9 % (FLUSH) 0.9 %
5-40 SYRINGE (ML) INJECTION PRN
Status: DISCONTINUED | OUTPATIENT
Start: 2025-08-07 | End: 2025-08-09 | Stop reason: HOSPADM

## 2025-08-07 RX ORDER — METRONIDAZOLE 500 MG/100ML
500 INJECTION, SOLUTION INTRAVENOUS EVERY 8 HOURS
Status: DISCONTINUED | OUTPATIENT
Start: 2025-08-07 | End: 2025-08-09

## 2025-08-07 RX ORDER — MAGNESIUM SULFATE IN WATER 40 MG/ML
2000 INJECTION, SOLUTION INTRAVENOUS PRN
Status: DISCONTINUED | OUTPATIENT
Start: 2025-08-07 | End: 2025-08-09 | Stop reason: HOSPADM

## 2025-08-07 RX ORDER — SODIUM CHLORIDE 0.9 % (FLUSH) 0.9 %
5-40 SYRINGE (ML) INJECTION EVERY 12 HOURS SCHEDULED
Status: DISCONTINUED | OUTPATIENT
Start: 2025-08-07 | End: 2025-08-09 | Stop reason: HOSPADM

## 2025-08-07 RX ORDER — SODIUM CHLORIDE 9 MG/ML
INJECTION, SOLUTION INTRAVENOUS PRN
Status: DISCONTINUED | OUTPATIENT
Start: 2025-08-07 | End: 2025-08-09 | Stop reason: HOSPADM

## 2025-08-07 RX ORDER — MAGNESIUM SULFATE IN WATER 40 MG/ML
2000 INJECTION, SOLUTION INTRAVENOUS ONCE
Status: COMPLETED | OUTPATIENT
Start: 2025-08-07 | End: 2025-08-07

## 2025-08-07 RX ORDER — PROMETHAZINE HYDROCHLORIDE 25 MG/ML
6.25 INJECTION, SOLUTION INTRAMUSCULAR; INTRAVENOUS ONCE
Status: DISCONTINUED | OUTPATIENT
Start: 2025-08-07 | End: 2025-08-07 | Stop reason: SDUPTHER

## 2025-08-07 RX ORDER — ACETAMINOPHEN 650 MG/1
650 SUPPOSITORY RECTAL EVERY 6 HOURS PRN
Status: DISCONTINUED | OUTPATIENT
Start: 2025-08-07 | End: 2025-08-09 | Stop reason: HOSPADM

## 2025-08-07 RX ORDER — POLYETHYLENE GLYCOL 3350 17 G/17G
17 POWDER, FOR SOLUTION ORAL DAILY PRN
Status: DISCONTINUED | OUTPATIENT
Start: 2025-08-07 | End: 2025-08-09 | Stop reason: HOSPADM

## 2025-08-07 RX ORDER — HYDROXYZINE HYDROCHLORIDE 25 MG/1
25 TABLET, FILM COATED ORAL 3 TIMES DAILY PRN
Status: DISCONTINUED | OUTPATIENT
Start: 2025-08-07 | End: 2025-08-09 | Stop reason: HOSPADM

## 2025-08-07 RX ORDER — ONDANSETRON 2 MG/ML
4 INJECTION INTRAMUSCULAR; INTRAVENOUS ONCE
Status: COMPLETED | OUTPATIENT
Start: 2025-08-07 | End: 2025-08-07

## 2025-08-07 RX ORDER — ACETAMINOPHEN 325 MG/1
650 TABLET ORAL EVERY 6 HOURS PRN
Status: DISCONTINUED | OUTPATIENT
Start: 2025-08-07 | End: 2025-08-09 | Stop reason: HOSPADM

## 2025-08-07 RX ORDER — MAGNESIUM HYDROXIDE/ALUMINUM HYDROXICE/SIMETHICONE 120; 1200; 1200 MG/30ML; MG/30ML; MG/30ML
30 SUSPENSION ORAL EVERY 6 HOURS PRN
Status: DISCONTINUED | OUTPATIENT
Start: 2025-08-07 | End: 2025-08-09 | Stop reason: HOSPADM

## 2025-08-07 RX ORDER — POTASSIUM CHLORIDE 7.45 MG/ML
10 INJECTION INTRAVENOUS PRN
Status: DISCONTINUED | OUTPATIENT
Start: 2025-08-07 | End: 2025-08-09 | Stop reason: HOSPADM

## 2025-08-07 RX ORDER — IOPAMIDOL 755 MG/ML
75 INJECTION, SOLUTION INTRAVASCULAR
Status: COMPLETED | OUTPATIENT
Start: 2025-08-07 | End: 2025-08-07

## 2025-08-07 RX ORDER — SODIUM CHLORIDE 9 MG/ML
INJECTION, SOLUTION INTRAVENOUS CONTINUOUS
Status: ACTIVE | OUTPATIENT
Start: 2025-08-07 | End: 2025-08-07

## 2025-08-07 RX ORDER — SODIUM CHLORIDE 9 MG/ML
INJECTION, SOLUTION INTRAVENOUS ONCE
Status: COMPLETED | OUTPATIENT
Start: 2025-08-07 | End: 2025-08-07

## 2025-08-07 RX ORDER — ONDANSETRON 2 MG/ML
4 INJECTION INTRAMUSCULAR; INTRAVENOUS EVERY 6 HOURS PRN
Status: DISCONTINUED | OUTPATIENT
Start: 2025-08-07 | End: 2025-08-09 | Stop reason: HOSPADM

## 2025-08-07 RX ORDER — 0.9 % SODIUM CHLORIDE 0.9 %
1000 INTRAVENOUS SOLUTION INTRAVENOUS ONCE
Status: COMPLETED | OUTPATIENT
Start: 2025-08-07 | End: 2025-08-07

## 2025-08-07 RX ORDER — ATORVASTATIN CALCIUM 10 MG/1
10 TABLET, FILM COATED ORAL NIGHTLY
Status: DISCONTINUED | OUTPATIENT
Start: 2025-08-07 | End: 2025-08-09 | Stop reason: HOSPADM

## 2025-08-07 RX ORDER — POTASSIUM CHLORIDE 1500 MG/1
40 TABLET, EXTENDED RELEASE ORAL PRN
Status: DISCONTINUED | OUTPATIENT
Start: 2025-08-07 | End: 2025-08-09 | Stop reason: HOSPADM

## 2025-08-07 RX ADMIN — CIPROFLOXACIN 400 MG: 400 INJECTION, SOLUTION INTRAVENOUS at 17:00

## 2025-08-07 RX ADMIN — Medication 25 MG: at 01:10

## 2025-08-07 RX ADMIN — Medication 6.25 MG: at 13:14

## 2025-08-07 RX ADMIN — SODIUM CHLORIDE, PRESERVATIVE FREE 10 ML: 5 INJECTION INTRAVENOUS at 08:25

## 2025-08-07 RX ADMIN — SODIUM CHLORIDE: 9 INJECTION, SOLUTION INTRAVENOUS at 04:19

## 2025-08-07 RX ADMIN — ONDANSETRON 4 MG: 2 INJECTION INTRAMUSCULAR; INTRAVENOUS at 08:25

## 2025-08-07 RX ADMIN — SODIUM CHLORIDE 1000 ML: 0.9 INJECTION, SOLUTION INTRAVENOUS at 01:12

## 2025-08-07 RX ADMIN — BUPRENORPHINE HCL 8 MG: 8 TABLET SUBLINGUAL at 21:02

## 2025-08-07 RX ADMIN — GABAPENTIN 800 MG: 400 CAPSULE ORAL at 08:24

## 2025-08-07 RX ADMIN — HYDROXYZINE HYDROCHLORIDE 25 MG: 25 TABLET, FILM COATED ORAL at 08:24

## 2025-08-07 RX ADMIN — HYOSCYAMINE SULFATE 0.12 MG: 0.12 TABLET SUBLINGUAL at 14:55

## 2025-08-07 RX ADMIN — ATORVASTATIN CALCIUM 10 MG: 10 TABLET, FILM COATED ORAL at 21:02

## 2025-08-07 RX ADMIN — BUPRENORPHINE HCL 8 MG: 8 TABLET SUBLINGUAL at 08:24

## 2025-08-07 RX ADMIN — BUPRENORPHINE HCL 8 MG: 8 TABLET SUBLINGUAL at 14:55

## 2025-08-07 RX ADMIN — GABAPENTIN 800 MG: 400 CAPSULE ORAL at 14:55

## 2025-08-07 RX ADMIN — ACETAMINOPHEN 650 MG: 325 TABLET ORAL at 14:55

## 2025-08-07 RX ADMIN — MAGNESIUM SULFATE HEPTAHYDRATE 2000 MG: 40 INJECTION, SOLUTION INTRAVENOUS at 04:18

## 2025-08-07 RX ADMIN — ENOXAPARIN SODIUM 40 MG: 100 INJECTION SUBCUTANEOUS at 08:25

## 2025-08-07 RX ADMIN — CIPROFLOXACIN 400 MG: 400 INJECTION, SOLUTION INTRAVENOUS at 06:27

## 2025-08-07 RX ADMIN — IOPAMIDOL 75 ML: 755 INJECTION, SOLUTION INTRAVENOUS at 02:07

## 2025-08-07 RX ADMIN — ONDANSETRON 4 MG: 2 INJECTION, SOLUTION INTRAMUSCULAR; INTRAVENOUS at 03:23

## 2025-08-07 RX ADMIN — DIPHENHYDRAMINE HCL 25 MG: 25 TABLET ORAL at 21:02

## 2025-08-07 RX ADMIN — GABAPENTIN 800 MG: 400 CAPSULE ORAL at 21:02

## 2025-08-07 ASSESSMENT — PAIN SCALES - GENERAL
PAINLEVEL_OUTOF10: 0
PAINLEVEL_OUTOF10: 8
PAINLEVEL_OUTOF10: 4
PAINLEVEL_OUTOF10: 7
PAINLEVEL_OUTOF10: 9
PAINLEVEL_OUTOF10: 5

## 2025-08-07 ASSESSMENT — ENCOUNTER SYMPTOMS
BLOOD IN STOOL: 0
ABDOMINAL PAIN: 0
COUGH: 0
DIARRHEA: 1
EYE PAIN: 0
NAUSEA: 1
SHORTNESS OF BREATH: 0
SORE THROAT: 0
BACK PAIN: 0
VOMITING: 1

## 2025-08-07 ASSESSMENT — PAIN DESCRIPTION - LOCATION
LOCATION: ABDOMEN
LOCATION: BACK
LOCATION: ABDOMEN

## 2025-08-07 ASSESSMENT — PAIN - FUNCTIONAL ASSESSMENT: PAIN_FUNCTIONAL_ASSESSMENT: ACTIVITIES ARE NOT PREVENTED

## 2025-08-07 ASSESSMENT — PAIN DESCRIPTION - PAIN TYPE: TYPE: ACUTE PAIN

## 2025-08-07 ASSESSMENT — PAIN DESCRIPTION - DESCRIPTORS
DESCRIPTORS: SHARP
DESCRIPTORS: ACHING;CRAMPING
DESCRIPTORS: CRAMPING

## 2025-08-07 ASSESSMENT — PAIN DESCRIPTION - ORIENTATION
ORIENTATION: LOWER
ORIENTATION: RIGHT;LEFT;MID

## 2025-08-07 ASSESSMENT — PAIN DESCRIPTION - ONSET: ONSET: ON-GOING

## 2025-08-07 ASSESSMENT — PAIN DESCRIPTION - FREQUENCY: FREQUENCY: CONTINUOUS

## 2025-08-08 LAB
ANION GAP SERPL CALCULATED.3IONS-SCNC: 16 MMOL/L (ref 3–16)
BASOPHILS # BLD: 0.1 K/UL (ref 0–0.2)
BASOPHILS NFR BLD: 0.7 %
BUN SERPL-MCNC: 15 MG/DL (ref 7–20)
CALCIUM SERPL-MCNC: 9.4 MG/DL (ref 8.3–10.6)
CHLORIDE SERPL-SCNC: 99 MMOL/L (ref 99–110)
CO2 SERPL-SCNC: 23 MMOL/L (ref 21–32)
CREAT SERPL-MCNC: 1.1 MG/DL (ref 0.6–1.1)
DEPRECATED RDW RBC AUTO: 13.2 % (ref 12.4–15.4)
EOSINOPHIL # BLD: 0.3 K/UL (ref 0–0.6)
EOSINOPHIL NFR BLD: 2.6 %
GFR SERPLBLD CREATININE-BSD FMLA CKD-EPI: 64 ML/MIN/{1.73_M2}
GLUCOSE SERPL-MCNC: 99 MG/DL (ref 70–99)
HCT VFR BLD AUTO: 46.5 % (ref 36–48)
HGB BLD-MCNC: 15.7 G/DL (ref 12–16)
LYMPHOCYTES # BLD: 4.7 K/UL (ref 1–5.1)
LYMPHOCYTES NFR BLD: 42.9 %
MAGNESIUM SERPL-MCNC: 1.87 MG/DL (ref 1.8–2.4)
MCH RBC QN AUTO: 28.8 PG (ref 26–34)
MCHC RBC AUTO-ENTMCNC: 33.7 G/DL (ref 31–36)
MCV RBC AUTO: 85.4 FL (ref 80–100)
MONOCYTES # BLD: 0.7 K/UL (ref 0–1.3)
MONOCYTES NFR BLD: 6 %
NEUTROPHILS # BLD: 5.2 K/UL (ref 1.7–7.7)
NEUTROPHILS NFR BLD: 47.8 %
PHOSPHATE SERPL-MCNC: 3.7 MG/DL (ref 2.5–4.9)
PLATELET # BLD AUTO: 244 K/UL (ref 135–450)
PLATELET BLD QL SMEAR: ADEQUATE
PMV BLD AUTO: 8.8 FL (ref 5–10.5)
POTASSIUM SERPL-SCNC: 3.3 MMOL/L (ref 3.5–5.1)
RBC # BLD AUTO: 5.45 M/UL (ref 4–5.2)
RBC MORPH BLD: NORMAL
SLIDE REVIEW: ABNORMAL
SODIUM SERPL-SCNC: 138 MMOL/L (ref 136–145)
WBC # BLD AUTO: 11 K/UL (ref 4–11)

## 2025-08-08 PROCEDURE — 85025 COMPLETE CBC W/AUTO DIFF WBC: CPT

## 2025-08-08 PROCEDURE — 36415 COLL VENOUS BLD VENIPUNCTURE: CPT

## 2025-08-08 PROCEDURE — 6360000002 HC RX W HCPCS

## 2025-08-08 PROCEDURE — 94760 N-INVAS EAR/PLS OXIMETRY 1: CPT

## 2025-08-08 PROCEDURE — 80048 BASIC METABOLIC PNL TOTAL CA: CPT

## 2025-08-08 PROCEDURE — 6370000000 HC RX 637 (ALT 250 FOR IP): Performed by: INTERNAL MEDICINE

## 2025-08-08 PROCEDURE — 83735 ASSAY OF MAGNESIUM: CPT

## 2025-08-08 PROCEDURE — 1200000000 HC SEMI PRIVATE

## 2025-08-08 PROCEDURE — 84100 ASSAY OF PHOSPHORUS: CPT

## 2025-08-08 PROCEDURE — 6370000000 HC RX 637 (ALT 250 FOR IP): Performed by: REGISTERED NURSE

## 2025-08-08 PROCEDURE — 6360000002 HC RX W HCPCS: Performed by: INTERNAL MEDICINE

## 2025-08-08 PROCEDURE — 2500000003 HC RX 250 WO HCPCS: Performed by: INTERNAL MEDICINE

## 2025-08-08 RX ORDER — DIPHENHYDRAMINE HYDROCHLORIDE 50 MG/ML
12.5 INJECTION, SOLUTION INTRAMUSCULAR; INTRAVENOUS EVERY 6 HOURS PRN
Status: DISPENSED | OUTPATIENT
Start: 2025-08-08 | End: 2025-08-09

## 2025-08-08 RX ORDER — KETOROLAC TROMETHAMINE 15 MG/ML
15 INJECTION, SOLUTION INTRAMUSCULAR; INTRAVENOUS EVERY 6 HOURS PRN
Status: COMPLETED | OUTPATIENT
Start: 2025-08-08 | End: 2025-08-08

## 2025-08-08 RX ADMIN — POTASSIUM CHLORIDE 40 MEQ: 1500 TABLET, EXTENDED RELEASE ORAL at 10:47

## 2025-08-08 RX ADMIN — SODIUM CHLORIDE, PRESERVATIVE FREE 5 ML: 5 INJECTION INTRAVENOUS at 09:13

## 2025-08-08 RX ADMIN — LIDOCAINE HYDROCHLORIDE: 20 SOLUTION ORAL at 21:02

## 2025-08-08 RX ADMIN — DIPHENHYDRAMINE HYDROCHLORIDE 12.5 MG: 50 INJECTION INTRAMUSCULAR; INTRAVENOUS at 11:49

## 2025-08-08 RX ADMIN — BUPRENORPHINE HCL 8 MG: 8 TABLET SUBLINGUAL at 21:02

## 2025-08-08 RX ADMIN — ONDANSETRON 4 MG: 2 INJECTION INTRAMUSCULAR; INTRAVENOUS at 23:34

## 2025-08-08 RX ADMIN — GABAPENTIN 800 MG: 400 CAPSULE ORAL at 21:02

## 2025-08-08 RX ADMIN — KETOROLAC TROMETHAMINE 15 MG: 15 INJECTION, SOLUTION INTRAMUSCULAR; INTRAVENOUS at 03:16

## 2025-08-08 RX ADMIN — BUPRENORPHINE HCL 8 MG: 8 TABLET SUBLINGUAL at 14:48

## 2025-08-08 RX ADMIN — GABAPENTIN 800 MG: 400 CAPSULE ORAL at 09:12

## 2025-08-08 RX ADMIN — DIPHENHYDRAMINE HYDROCHLORIDE 12.5 MG: 50 INJECTION INTRAMUSCULAR; INTRAVENOUS at 03:17

## 2025-08-08 RX ADMIN — CIPROFLOXACIN 400 MG: 400 INJECTION, SOLUTION INTRAVENOUS at 05:48

## 2025-08-08 RX ADMIN — ENOXAPARIN SODIUM 40 MG: 100 INJECTION SUBCUTANEOUS at 09:12

## 2025-08-08 RX ADMIN — ONDANSETRON 4 MG: 2 INJECTION INTRAMUSCULAR; INTRAVENOUS at 14:50

## 2025-08-08 RX ADMIN — BUPRENORPHINE HCL 8 MG: 8 TABLET SUBLINGUAL at 09:12

## 2025-08-08 RX ADMIN — GABAPENTIN 800 MG: 400 CAPSULE ORAL at 14:48

## 2025-08-08 RX ADMIN — CIPROFLOXACIN 400 MG: 400 INJECTION, SOLUTION INTRAVENOUS at 16:34

## 2025-08-08 RX ADMIN — ATORVASTATIN CALCIUM 10 MG: 10 TABLET, FILM COATED ORAL at 21:02

## 2025-08-08 ASSESSMENT — PAIN DESCRIPTION - PAIN TYPE: TYPE: ACUTE PAIN

## 2025-08-08 ASSESSMENT — PAIN - FUNCTIONAL ASSESSMENT: PAIN_FUNCTIONAL_ASSESSMENT: ACTIVITIES ARE NOT PREVENTED

## 2025-08-08 ASSESSMENT — PAIN SCALES - GENERAL
PAINLEVEL_OUTOF10: 10
PAINLEVEL_OUTOF10: 9
PAINLEVEL_OUTOF10: 8
PAINLEVEL_OUTOF10: 2

## 2025-08-08 ASSESSMENT — PAIN DESCRIPTION - LOCATION
LOCATION: ABDOMEN

## 2025-08-08 ASSESSMENT — PAIN DESCRIPTION - FREQUENCY: FREQUENCY: CONTINUOUS

## 2025-08-08 ASSESSMENT — PAIN DESCRIPTION - DESCRIPTORS: DESCRIPTORS: CRAMPING

## 2025-08-08 ASSESSMENT — PAIN DESCRIPTION - ONSET: ONSET: ON-GOING

## 2025-08-08 ASSESSMENT — PAIN DESCRIPTION - ORIENTATION: ORIENTATION: LEFT;RIGHT

## 2025-08-09 VITALS
BODY MASS INDEX: 30.62 KG/M2 | DIASTOLIC BLOOD PRESSURE: 80 MMHG | HEART RATE: 84 BPM | SYSTOLIC BLOOD PRESSURE: 108 MMHG | TEMPERATURE: 98.8 F | OXYGEN SATURATION: 98 % | HEIGHT: 59 IN | WEIGHT: 151.9 LBS | RESPIRATION RATE: 16 BRPM

## 2025-08-09 LAB
BACTERIA URNS QL MICRO: ABNORMAL /HPF
BILIRUB UR QL STRIP.AUTO: NEGATIVE
C DIFF TOX A+B STL QL IA: NORMAL
CHARACTER UR: ABNORMAL
CLARITY UR: ABNORMAL
COLOR UR: YELLOW
EPI CELLS #/AREA URNS AUTO: 10 /HPF (ref 0–5)
GI PATHOGENS PNL STL NAA+PROBE: NORMAL
GLUCOSE UR STRIP.AUTO-MCNC: NEGATIVE MG/DL
HGB UR QL STRIP.AUTO: NEGATIVE
HYALINE CASTS #/AREA URNS AUTO: 6 /LPF (ref 0–8)
KETONES UR STRIP.AUTO-MCNC: NEGATIVE MG/DL
LEUKOCYTE ESTERASE UR QL STRIP.AUTO: ABNORMAL
NITRITE UR QL STRIP.AUTO: NEGATIVE
PH UR STRIP.AUTO: 5 [PH] (ref 5–8)
PROT UR STRIP.AUTO-MCNC: ABNORMAL MG/DL
RBC #/AREA URNS HPF: ABNORMAL /HPF (ref 0–4)
SP GR UR STRIP.AUTO: 1.02 (ref 1–1.03)
UA COMPLETE W REFLEX CULTURE PNL UR: YES
UA DIPSTICK W REFLEX MICRO PNL UR: YES
URN SPEC COLLECT METH UR: ABNORMAL
UROBILINOGEN UR STRIP-ACNC: 0.2 E.U./DL
WBC #/AREA URNS AUTO: 21 /HPF (ref 0–5)

## 2025-08-09 PROCEDURE — 81001 URINALYSIS AUTO W/SCOPE: CPT

## 2025-08-09 PROCEDURE — 6360000002 HC RX W HCPCS: Performed by: REGISTERED NURSE

## 2025-08-09 PROCEDURE — 87506 IADNA-DNA/RNA PROBE TQ 6-11: CPT

## 2025-08-09 PROCEDURE — 87449 NOS EACH ORGANISM AG IA: CPT

## 2025-08-09 PROCEDURE — 6370000000 HC RX 637 (ALT 250 FOR IP)

## 2025-08-09 PROCEDURE — 6360000002 HC RX W HCPCS: Performed by: INTERNAL MEDICINE

## 2025-08-09 PROCEDURE — 87324 CLOSTRIDIUM AG IA: CPT

## 2025-08-09 PROCEDURE — 6370000000 HC RX 637 (ALT 250 FOR IP): Performed by: INTERNAL MEDICINE

## 2025-08-09 PROCEDURE — 87086 URINE CULTURE/COLONY COUNT: CPT

## 2025-08-09 RX ORDER — METRONIDAZOLE 500 MG/1
500 TABLET ORAL EVERY 8 HOURS SCHEDULED
Qty: 30 TABLET | Refills: 0 | Status: SHIPPED | OUTPATIENT
Start: 2025-08-09 | End: 2025-08-19

## 2025-08-09 RX ORDER — MORPHINE SULFATE 2 MG/ML
2 INJECTION, SOLUTION INTRAMUSCULAR; INTRAVENOUS ONCE
Status: COMPLETED | OUTPATIENT
Start: 2025-08-09 | End: 2025-08-09

## 2025-08-09 RX ORDER — PANTOPRAZOLE SODIUM 40 MG/1
40 TABLET, DELAYED RELEASE ORAL
Status: DISCONTINUED | OUTPATIENT
Start: 2025-08-10 | End: 2025-08-09 | Stop reason: HOSPADM

## 2025-08-09 RX ORDER — CIPROFLOXACIN 500 MG/1
500 TABLET, FILM COATED ORAL EVERY 12 HOURS SCHEDULED
Qty: 20 TABLET | Refills: 0 | Status: SHIPPED | OUTPATIENT
Start: 2025-08-09 | End: 2025-08-19

## 2025-08-09 RX ORDER — PROMETHAZINE HYDROCHLORIDE 25 MG/1
25 TABLET ORAL EVERY 8 HOURS PRN
Qty: 12 TABLET | Refills: 0 | Status: SHIPPED | OUTPATIENT
Start: 2025-08-09 | End: 2025-08-16

## 2025-08-09 RX ORDER — CIPROFLOXACIN 500 MG/1
500 TABLET, FILM COATED ORAL EVERY 12 HOURS SCHEDULED
Status: DISCONTINUED | OUTPATIENT
Start: 2025-08-09 | End: 2025-08-09 | Stop reason: HOSPADM

## 2025-08-09 RX ORDER — PANTOPRAZOLE SODIUM 40 MG/1
40 TABLET, DELAYED RELEASE ORAL
Qty: 30 TABLET | Refills: 3 | Status: SHIPPED | OUTPATIENT
Start: 2025-08-10

## 2025-08-09 RX ORDER — METRONIDAZOLE 500 MG/1
500 TABLET ORAL EVERY 8 HOURS SCHEDULED
Status: DISCONTINUED | OUTPATIENT
Start: 2025-08-09 | End: 2025-08-09 | Stop reason: HOSPADM

## 2025-08-09 RX ADMIN — CIPROFLOXACIN 400 MG: 400 INJECTION, SOLUTION INTRAVENOUS at 06:25

## 2025-08-09 RX ADMIN — ONDANSETRON 4 MG: 2 INJECTION INTRAMUSCULAR; INTRAVENOUS at 06:25

## 2025-08-09 RX ADMIN — DIPHENHYDRAMINE HCL 25 MG: 25 TABLET ORAL at 00:44

## 2025-08-09 RX ADMIN — BUPRENORPHINE HCL 8 MG: 8 TABLET SUBLINGUAL at 08:39

## 2025-08-09 RX ADMIN — ENOXAPARIN SODIUM 40 MG: 100 INJECTION SUBCUTANEOUS at 08:39

## 2025-08-09 RX ADMIN — MORPHINE SULFATE 2 MG: 2 INJECTION, SOLUTION INTRAMUSCULAR; INTRAVENOUS at 00:44

## 2025-08-09 RX ADMIN — HYDROXYZINE HYDROCHLORIDE 25 MG: 25 TABLET, FILM COATED ORAL at 08:39

## 2025-08-09 RX ADMIN — GABAPENTIN 800 MG: 400 CAPSULE ORAL at 08:39

## 2025-08-09 ASSESSMENT — PAIN DESCRIPTION - LOCATION: LOCATION: ABDOMEN

## 2025-08-09 ASSESSMENT — PAIN SCALES - GENERAL
PAINLEVEL_OUTOF10: 10
PAINLEVEL_OUTOF10: 0

## 2025-08-10 LAB — BACTERIA UR CULT: NORMAL

## 2025-08-11 ENCOUNTER — CARE COORDINATION (OUTPATIENT)
Dept: CASE MANAGEMENT | Age: 45
End: 2025-08-11

## 2025-08-12 ENCOUNTER — CARE COORDINATION (OUTPATIENT)
Dept: CASE MANAGEMENT | Age: 45
End: 2025-08-12

## 2025-08-26 ENCOUNTER — HOSPITAL ENCOUNTER (EMERGENCY)
Age: 45
Discharge: HOME | End: 2025-08-26
Attending: EMERGENCY MEDICINE
Payer: COMMERCIAL

## 2025-08-26 VITALS
RESPIRATION RATE: 16 BRPM | TEMPERATURE: 99 F | HEIGHT: 59 IN | HEART RATE: 93 BPM | DIASTOLIC BLOOD PRESSURE: 106 MMHG | WEIGHT: 162.92 LBS | BODY MASS INDEX: 32.84 KG/M2 | SYSTOLIC BLOOD PRESSURE: 136 MMHG | OXYGEN SATURATION: 98 %

## 2025-08-26 DIAGNOSIS — S61.451A CAT BITE OF HAND, RIGHT, INITIAL ENCOUNTER: Primary | ICD-10-CM
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
## 2025-08-26 DIAGNOSIS — Z23 NEED FOR TETANUS BOOSTER: ICD-10-CM

## 2025-08-26 PROCEDURE — 90471 IMMUNIZATION ADMIN: CPT | Performed by: EMERGENCY MEDICINE

## 2025-08-26 PROCEDURE — 6360000002 HC RX W HCPCS: Performed by: EMERGENCY MEDICINE

## 2025-08-26 PROCEDURE — 6370000000 HC RX 637 (ALT 250 FOR IP): Performed by: EMERGENCY MEDICINE

## 2025-08-26 PROCEDURE — 99284 EMERGENCY DEPT VISIT MOD MDM: CPT

## 2025-08-26 PROCEDURE — 96372 THER/PROPH/DIAG INJ SC/IM: CPT

## 2025-08-26 PROCEDURE — 90715 TDAP VACCINE 7 YRS/> IM: CPT | Performed by: EMERGENCY MEDICINE

## 2025-08-26 RX ORDER — ONDANSETRON 2 MG/ML
4 INJECTION INTRAMUSCULAR; INTRAVENOUS ONCE
Status: DISCONTINUED | OUTPATIENT
Start: 2025-08-26 | End: 2025-08-26

## 2025-08-26 RX ORDER — ONDANSETRON 4 MG/1
4 TABLET, ORALLY DISINTEGRATING ORAL ONCE
Status: COMPLETED | OUTPATIENT
Start: 2025-08-26 | End: 2025-08-26

## 2025-08-26 RX ORDER — ONDANSETRON 4 MG/1
4 TABLET, ORALLY DISINTEGRATING ORAL 3 TIMES DAILY PRN
Qty: 21 TABLET | Refills: 0 | Status: SHIPPED | OUTPATIENT
Start: 2025-08-26

## 2025-08-26 RX ADMIN — AMOXICILLIN AND CLAVULANATE POTASSIUM 1 TABLET: 875; 125 TABLET, FILM COATED ORAL at 13:35

## 2025-08-26 RX ADMIN — ONDANSETRON 4 MG: 4 TABLET, ORALLY DISINTEGRATING ORAL at 13:20

## 2025-08-26 RX ADMIN — TETANUS TOXOID, REDUCED DIPHTHERIA TOXOID AND ACELLULAR PERTUSSIS VACCINE, ADSORBED 0.5 ML: 5; 2.5; 8; 8; 2.5 SUSPENSION INTRAMUSCULAR at 14:28

## 2025-08-26 ASSESSMENT — PAIN - FUNCTIONAL ASSESSMENT: PAIN_FUNCTIONAL_ASSESSMENT: 0-10

## 2025-08-26 ASSESSMENT — PAIN SCALES - GENERAL: PAINLEVEL_OUTOF10: 8

## 2025-08-27 DIAGNOSIS — E78.2 MIXED HYPERLIPIDEMIA: ICD-10-CM

## 2025-08-28 RX ORDER — ATORVASTATIN CALCIUM 10 MG/1
10 TABLET, FILM COATED ORAL NIGHTLY
Qty: 90 TABLET | Refills: 1 | Status: SHIPPED | OUTPATIENT
Start: 2025-08-28

## (undated) DEVICE — 3M™ STERI-STRIP™ COMPOUND BENZOIN TINCTURE 40 BAGS/CARTON 4 CARTONS/CASE C1544: Brand: 3M™ STERI-STRIP™

## (undated) DEVICE — SUTURE VCRL + SZ 3-0 L27IN ABSRB UD L26MM SH 1/2 CIR VCP416H

## (undated) DEVICE — SHEET, T, LAPAROTOMY, STERILE: Brand: MEDLINE

## (undated) DEVICE — SHEET,DRAPE,53X77,STERILE: Brand: MEDLINE

## (undated) DEVICE — TROCARS: Brand: KII® BALLOON BLUNT TIP SYSTEM

## (undated) DEVICE — PMI DISPOSABLE PUNCTURE CLOSURE DEVICE / SUTURE GRASPER: Brand: PMI

## (undated) DEVICE — FORCEPS BX L240CM WRK CHN 2.8MM STD CAP W/ NDL MIC MESH

## (undated) DEVICE — GOWN AURORA NONREINF LG: Brand: MEDLINE INDUSTRIES, INC.

## (undated) DEVICE — RETRIEVAL BALLOON CATHETER: Brand: EXTRACTOR™ PRO RX

## (undated) DEVICE — MOUTHPIECE ENDOSCP L CTRL OPN AND SIDE PORTS DISP

## (undated) DEVICE — DEVICE LOK BILI BX CAP RAP EXCHG DISPOSABLE

## (undated) DEVICE — SPHINCTEROTOME: Brand: JAGTOME RX 39

## (undated) DEVICE — GARMENT COMPR STD FOR 17IN CALF UNIF THER FLOTRN

## (undated) DEVICE — CLEANER,CAUTERY TIP,2X2",STERILE: Brand: MEDLINE

## (undated) DEVICE — NEEDLE 25GA X 1.5 IN ECLIPSE

## (undated) DEVICE — VALVE SUCTION AIR H2O SET ORCA POD + DISP

## (undated) DEVICE — GOWN SIRUS NONREIN LG W/TWL: Brand: MEDLINE INDUSTRIES, INC.

## (undated) DEVICE — GLOVE ORANGE PI 7   MSG9070

## (undated) DEVICE — GAUZE,SPONGE,2"X2",8PLY,STERILE,LF,2'S: Brand: MEDLINE

## (undated) DEVICE — TROCAR: Brand: KII FIOS FIRST ENTRY

## (undated) DEVICE — COVER LT HNDL BLU PLAS

## (undated) DEVICE — SUTURE SZ 0 27IN 5/8 CIR UR-6  TAPER PT VIOLET ABSRB VICRYL J603H

## (undated) DEVICE — Device

## (undated) DEVICE — TROCAR: Brand: KII SHIELDED BLADED ACCESS SYSTEM

## (undated) DEVICE — TROCAR ENDOSCP L100MM DIA12MM BLNT TIP OBT RADLUC STBL SL

## (undated) DEVICE — TROCAR ENDOSCP L100MM DIA5MM BLDELSS STBL SL OBT RADLUC

## (undated) DEVICE — SOLUTION IV IRRIG WATER 500ML POUR BRL ST 2F7113

## (undated) DEVICE — SINGLE USE DISTAL COVER MAJ-2315: Brand: SINGLE USE DISTAL COVER

## (undated) DEVICE — 3M™ TEGADERM™ TRANSPARENT FILM DRESSING FRAME STYLE, 1626W, 4 IN X 4-3/4 IN (10 CM X 12 CM), 50/CT 4CT/CASE: Brand: 3M™ TEGADERM™

## (undated) DEVICE — 3M™ TEGADERM™ TRANSPARENT FILM DRESSING FRAME STYLE, 1624W, 2-3/8 IN X 2-3/4 IN (6 CM X 7 CM), 100/CT 4CT/CASE: Brand: 3M™ TEGADERM™

## (undated) DEVICE — STRIP,CLOSURE,WOUND,MEDI-STRIP,1/2X4: Brand: MEDLINE

## (undated) DEVICE — Z DISCONTINUED BY MEDLINE USE 2711682 TRAY SKIN PREP DRY W/ PREM GLV

## (undated) DEVICE — ORGANIZER MED DEV W76XL86CM PCH W25XL28CM FOR ENDOSCP TBL

## (undated) DEVICE — ELECTRODE PT RET AD L9FT HI MOIST COND ADH HYDRGEL CORDED

## (undated) DEVICE — SPONGE GZ W4XL4IN COT 12 PLY TYP VII WVN C FLD DSGN STERILE

## (undated) DEVICE — SOLUTION IV IRRIG POUR BRL 0.9% SODIUM CHL 2F7124

## (undated) DEVICE — GENERAL LAPAROSCOPY: Brand: MEDLINE INDUSTRIES, INC.

## (undated) DEVICE — MAJOR SET UP: Brand: MEDLINE INDUSTRIES, INC.

## (undated) DEVICE — AIR/WATER CLEANING ADAPTER FOR OLYMPUS® GI ENDOSCOPE: Brand: BULLDOG®

## (undated) DEVICE — FORMALIN CLEAR VIAL 20 ML 10%

## (undated) DEVICE — GLOVE SURG SZ 7 CRM LTX FREE POLYISOPRENE POLYMER BEAD ANTI

## (undated) DEVICE — PENCIL ES L3M BTTN SWCH S STL HEX LOK BLDE ELECTRD HOLSTER

## (undated) DEVICE — SET INSUF TUBE HEAT ISO CONN DISP

## (undated) DEVICE — MERCY HEALTH WEST TURNOVER: Brand: MEDLINE INDUSTRIES, INC.

## (undated) DEVICE — ENDOSCOPY KIT: Brand: MEDLINE INDUSTRIES, INC.

## (undated) DEVICE — SCISSORS ENDOSCP DIA5MM CRV MPLR CAUT W/ RATCH HNDL

## (undated) DEVICE — BITE BLOCK ENDOSCP AD 60 FR W/ ADJ STRP PLAS GRN BLOX

## (undated) DEVICE — BW-412T DISP COMBO CLEANING BRUSH: Brand: SINGLE USE COMBINATION CLEANING BRUSH

## (undated) DEVICE — SYRINGE MED 10ML LUERLOCK TIP W/O SFTY DISP

## (undated) DEVICE — Device: Brand: BALLOON3

## (undated) DEVICE — FORCEPS BX 240CM 2.4MM L NDL RAD JAW 4 M00513334

## (undated) DEVICE — NEPTUNE E-SEP SMOKE EVACUATION PENCIL, COATED, 70MM BLADE, PUSH BUTTON SWITCH: Brand: NEPTUNE E-SEP

## (undated) DEVICE — ENDOSCOPIC KIT 6X3/16 FT COLON W/ 1.1 OZ 2 GWN W/O BRSH